# Patient Record
Sex: MALE | Race: WHITE | URBAN - METROPOLITAN AREA
[De-identification: names, ages, dates, MRNs, and addresses within clinical notes are randomized per-mention and may not be internally consistent; named-entity substitution may affect disease eponyms.]

---

## 2017-07-13 ENCOUNTER — EMERGENCY (EMERGENCY)
Facility: HOSPITAL | Age: 57
LOS: 0 days | Discharge: HOME | End: 2017-07-13
Admitting: INTERNAL MEDICINE

## 2017-07-13 DIAGNOSIS — E11.9 TYPE 2 DIABETES MELLITUS WITHOUT COMPLICATIONS: ICD-10-CM

## 2017-07-13 DIAGNOSIS — I10 ESSENTIAL (PRIMARY) HYPERTENSION: ICD-10-CM

## 2017-07-13 DIAGNOSIS — E78.5 HYPERLIPIDEMIA, UNSPECIFIED: ICD-10-CM

## 2017-07-13 DIAGNOSIS — Z79.899 OTHER LONG TERM (CURRENT) DRUG THERAPY: ICD-10-CM

## 2017-07-13 DIAGNOSIS — N49.2 INFLAMMATORY DISORDERS OF SCROTUM: ICD-10-CM

## 2017-07-13 DIAGNOSIS — Z79.84 LONG TERM (CURRENT) USE OF ORAL HYPOGLYCEMIC DRUGS: ICD-10-CM

## 2017-07-13 DIAGNOSIS — E78.00 PURE HYPERCHOLESTEROLEMIA, UNSPECIFIED: ICD-10-CM

## 2017-07-13 DIAGNOSIS — R10.30 LOWER ABDOMINAL PAIN, UNSPECIFIED: ICD-10-CM

## 2017-07-13 DIAGNOSIS — Z88.0 ALLERGY STATUS TO PENICILLIN: ICD-10-CM

## 2017-08-09 ENCOUNTER — OUTPATIENT (OUTPATIENT)
Dept: OUTPATIENT SERVICES | Facility: HOSPITAL | Age: 57
LOS: 1 days | Discharge: HOME | End: 2017-08-09

## 2017-08-09 DIAGNOSIS — I10 ESSENTIAL (PRIMARY) HYPERTENSION: ICD-10-CM

## 2017-08-09 DIAGNOSIS — E11.9 TYPE 2 DIABETES MELLITUS WITHOUT COMPLICATIONS: ICD-10-CM

## 2017-08-09 DIAGNOSIS — E78.5 HYPERLIPIDEMIA, UNSPECIFIED: ICD-10-CM

## 2017-11-18 ENCOUNTER — OUTPATIENT (OUTPATIENT)
Dept: OUTPATIENT SERVICES | Facility: HOSPITAL | Age: 57
LOS: 1 days | Discharge: HOME | End: 2017-11-18

## 2017-11-18 DIAGNOSIS — I10 ESSENTIAL (PRIMARY) HYPERTENSION: ICD-10-CM

## 2017-11-18 DIAGNOSIS — E11.65 TYPE 2 DIABETES MELLITUS WITH HYPERGLYCEMIA: ICD-10-CM

## 2018-05-05 ENCOUNTER — OUTPATIENT (OUTPATIENT)
Dept: OUTPATIENT SERVICES | Facility: HOSPITAL | Age: 58
LOS: 1 days | Discharge: HOME | End: 2018-05-05

## 2018-05-05 DIAGNOSIS — I10 ESSENTIAL (PRIMARY) HYPERTENSION: ICD-10-CM

## 2018-05-05 DIAGNOSIS — E78.5 HYPERLIPIDEMIA, UNSPECIFIED: ICD-10-CM

## 2018-05-05 DIAGNOSIS — E11.9 TYPE 2 DIABETES MELLITUS WITHOUT COMPLICATIONS: ICD-10-CM

## 2019-03-02 ENCOUNTER — OUTPATIENT (OUTPATIENT)
Dept: OUTPATIENT SERVICES | Facility: HOSPITAL | Age: 59
LOS: 1 days | Discharge: HOME | End: 2019-03-02

## 2019-03-02 DIAGNOSIS — I10 ESSENTIAL (PRIMARY) HYPERTENSION: ICD-10-CM

## 2019-03-02 DIAGNOSIS — E78.5 HYPERLIPIDEMIA, UNSPECIFIED: ICD-10-CM

## 2019-03-02 DIAGNOSIS — E11.9 TYPE 2 DIABETES MELLITUS WITHOUT COMPLICATIONS: ICD-10-CM

## 2019-03-09 ENCOUNTER — OUTPATIENT (OUTPATIENT)
Dept: OUTPATIENT SERVICES | Facility: HOSPITAL | Age: 59
LOS: 1 days | Discharge: HOME | End: 2019-03-09

## 2019-03-09 DIAGNOSIS — E11.9 TYPE 2 DIABETES MELLITUS WITHOUT COMPLICATIONS: ICD-10-CM

## 2019-03-09 DIAGNOSIS — I10 ESSENTIAL (PRIMARY) HYPERTENSION: ICD-10-CM

## 2019-03-09 DIAGNOSIS — E78.5 HYPERLIPIDEMIA, UNSPECIFIED: ICD-10-CM

## 2020-07-13 PROBLEM — Z00.00 ENCOUNTER FOR PREVENTIVE HEALTH EXAMINATION: Status: ACTIVE | Noted: 2020-07-13

## 2020-08-25 ENCOUNTER — APPOINTMENT (OUTPATIENT)
Dept: PLASTIC SURGERY | Facility: CLINIC | Age: 60
End: 2020-08-25
Payer: COMMERCIAL

## 2020-08-25 VITALS — BODY MASS INDEX: 25.76 KG/M2 | HEIGHT: 68 IN | WEIGHT: 170 LBS

## 2020-08-25 DIAGNOSIS — Z86.39 PERSONAL HISTORY OF OTHER ENDOCRINE, NUTRITIONAL AND METABOLIC DISEASE: ICD-10-CM

## 2020-08-25 DIAGNOSIS — Z87.891 PERSONAL HISTORY OF NICOTINE DEPENDENCE: ICD-10-CM

## 2020-08-25 DIAGNOSIS — Z78.9 OTHER SPECIFIED HEALTH STATUS: ICD-10-CM

## 2020-08-25 DIAGNOSIS — I25.2 OLD MYOCARDIAL INFARCTION: ICD-10-CM

## 2020-08-25 DIAGNOSIS — Z86.79 PERSONAL HISTORY OF OTHER DISEASES OF THE CIRCULATORY SYSTEM: ICD-10-CM

## 2020-08-25 PROCEDURE — 99203 OFFICE O/P NEW LOW 30 MIN: CPT

## 2020-08-25 RX ORDER — METOPROLOL TARTRATE 75 MG/1
TABLET, FILM COATED ORAL
Refills: 0 | Status: ACTIVE | COMMUNITY

## 2020-08-25 RX ORDER — LINAGLIPTIN AND METFORMIN HYDROCHLORIDE 2.5; 1 MG/1; MG/1
TABLET, FILM COATED ORAL
Refills: 0 | Status: ACTIVE | COMMUNITY

## 2020-08-25 RX ORDER — ATORVASTATIN CALCIUM 10 MG/1
10 TABLET, FILM COATED ORAL
Refills: 0 | Status: ACTIVE | COMMUNITY

## 2020-08-25 RX ORDER — CLOPIDOGREL BISULFATE 75 MG/1
75 TABLET, FILM COATED ORAL
Refills: 0 | Status: ACTIVE | COMMUNITY

## 2020-08-25 RX ORDER — ASPIRIN 81 MG
81 TABLET, DELAYED RELEASE (ENTERIC COATED) ORAL
Refills: 0 | Status: ACTIVE | COMMUNITY

## 2020-08-25 NOTE — ASSESSMENT
[FreeTextEntry1] : 61 yo M with right upper back epidermal inclusion cyst and right forearm lipoma. \par \par - recommend excision of both \par \par as above\par right foremarm lipoma and right upper back cyst\par office procedure\par \par Regarding the procedure, we discussed scarring, poor wound healing, bleeding, infection, need for additional surgery, and dissatisfaction with the outcome.  Also discussed possibility of keloid and/or hypertrophic scar formation as well as recurrence.  All questions were answered and risks understood.\par \par will ask cardiologist if can hold ASA or Plavix one week prior\par

## 2020-08-25 NOTE — HISTORY OF PRESENT ILLNESS
[FreeTextEntry1] : 59 yo M with PMH of HTN, CAD, MI 2/20 s/p cardiac stents on ASA/Plavix, who presents today for evaluation of right upper back cyst present for few years increasing in size with h/o intermittent spontaneous drainage. Also c/o right forearm subcutaneous mass increasing in size. Denies any pain or discoloration. Denies any family h/o skin cancer. \par \par Occupation- repairs wheelchairs\par Social hx- former smoker, quit after MI in February 2020

## 2020-08-25 NOTE — PHYSICAL EXAM
[de-identified] : well-developed pleasant male, NAD [de-identified] : NC/AT [de-identified] : PERRL [de-identified] : supple [de-identified] : unlabored breathing, good inspiratory effort [de-identified] : MARJANR [de-identified] : soft, nontender  [de-identified] : Back- right upper back with a soft, subcutaneous cystic mobile lesion measuring 3 x 2.5 cm, small punctum presents, no erythema or active infection, nontender \par Forearm- right radial volar distal forearm with a 3x3 cm soft, mobile, nontender subcutaneous mass c/w lipoma

## 2020-10-08 ENCOUNTER — APPOINTMENT (OUTPATIENT)
Dept: PLASTIC SURGERY | Facility: CLINIC | Age: 60
End: 2020-10-08
Payer: COMMERCIAL

## 2020-10-08 ENCOUNTER — LABORATORY RESULT (OUTPATIENT)
Age: 60
End: 2020-10-08

## 2020-10-08 PROCEDURE — 13101 CMPLX RPR TRUNK 2.6-7.5 CM: CPT | Mod: 59

## 2020-10-08 PROCEDURE — 13121 CMPLX RPR S/A/L 2.6-7.5 CM: CPT | Mod: 59

## 2020-10-08 PROCEDURE — 11403 EXC TR-EXT B9+MARG 2.1-3CM: CPT

## 2020-10-08 NOTE — PROCEDURE
[FreeTextEntry6] : Patient is a 60 year old male with a right forearm lipoma measuring approximately 3 x 3 cm and a right upper back cyst measuring approximately 3 x 2.5 cm.\par \par The areas were prepped and draped in the usual fashion.  Local anesthetic was administered using 1% lidocaine with epinephrine.\par \par The lipoma and cyst were both sharply excised.  Area was irrigated copiously.  Complex wound closure was performed in layers.  The forearm wound measured approximately 3.5  cm and the back wound measured approximately 3.5 cm.\par \par Sterile dressing applied.  \par \par Patient tolerated procedure well and understands post-op instructions.\par \par Sutures Used:  3-0 prolene, 3-0 monocryl\par \par \par \par

## 2020-10-22 ENCOUNTER — APPOINTMENT (OUTPATIENT)
Dept: PLASTIC SURGERY | Facility: CLINIC | Age: 60
End: 2020-10-22
Payer: COMMERCIAL

## 2020-10-22 DIAGNOSIS — L72.0 EPIDERMAL CYST: ICD-10-CM

## 2020-10-22 DIAGNOSIS — D17.21 BENIGN LIPOMATOUS NEOPLASM OF SKIN AND SUBCUTANEOUS TISSUE OF RIGHT ARM: ICD-10-CM

## 2020-10-22 PROCEDURE — 99212 OFFICE O/P EST SF 10 MIN: CPT

## 2020-10-22 NOTE — DATA REVIEWED
[FreeTextEntry1] : Biopsy             Final\par \par No Documents Attached\par \par \par \par   ALICIA BARBOUR                        2\par \par \par                  Surgical Final Report\par \par \par           Final Diagnosis\par           1. Right upper back cyst, excision:\par           - Epidermal inclusion cyst.\par \par           2. Right forearm lipoma, excision:\par           - Lipomatous tissue.\par \par           Verified by: Carolina Giles M.D.\par           (Electronic Signature)\par           Reported on: 10/14/20 11:31 EDT, [address] [place] [id number]\par           Phone: [phone]   Fax: [phone]\par           _________________________________________________________________\par \par           Clinical History\par           Excision of right upper back cyst, excision of right forearm\par           lipoma\par \par           Specimen(s) Submitted\par           1     Excision right upper back cyst\par           2     Excision of right forearm lipoma\par \par           Gross Description\par           1.The specimen is received in formalin, labeled "excision of\par           right upper back cyst" and consists of a single piece of soft\par           tissue, measuring 2 x 1.5 x 1.5 cm.  The specimen has a cystic\par           structure which is partially opened. The outer surface is white,\par           smooth with attached fatty tissue. The specimen is inked.\par           Sectioning reveals a cyst wall less than 0.1 cm in thickness, and\par           whitish cheesy contents. Representative sections are submitted.\par           (1 block)\par \par           2. The specimen is received in formalin, labeled "excision of\par           right forearm lipoma" and consists of a single piece of lobulated\par           fatty tissue, measuring 4 x 2.5 x 0.8 cm. The specimen is inked\par           black and sectioned to reveal an uniformly yellow, unremarkable\par           fatty cut surface. Representative sections are submitted. (2\par           blocks)\par \par           Specimen was received and underwent gross examination at [facility], [address] [place] [address],\par           [place] [address].\par \par           10/09/20 14:02 rr\par \par           Perioperative Diagnosis\par \par \par \par \par \par \par \par           ALICIA BARBOUR                        2\par \par \par \par                  Surgical Final Report\par \par \par \par \par           L72.0-Epidermal cyst\par           D17.21-Benign lipomatous neoplasm of skin and subcutaneous tissue\par           of right arm\par \par  \par \par  Ordered by: JEANNE MARY IV       Collected/Examined: [id number] 02:14PM       \par Verification Required       Stage: Final       \par  Performed at: [facility] (Med Director: Howard Raymond M.D)       Resulted: 14Oct2020 11:31AM       Last Updated: 14Oct2020 11:31AM       Accession: [id number]

## 2020-10-22 NOTE — HISTORY OF PRESENT ILLNESS
[FreeTextEntry1] : 59 yo M with PMH of HTN, CAD, MI 2/20 s/p cardiac stents on ASA/Plavix, who presents today for evaluation of right upper back cyst present for few years increasing in size with h/o intermittent spontaneous drainage. Also c/o right forearm subcutaneous mass increasing in size. Denies any pain or discoloration. Denies any family h/o skin cancer. \par \par Occupation- repairs wheelchairs\par Social hx- former smoker, quit after MI in February 2020\par \par Interval hx (10/22/20). Patient presents today 2 weeks s/p excision of right upper back cyst and right forearm lipoma. Doing well with no significant pain, f/c or drainage.

## 2020-10-22 NOTE — ASSESSMENT
[FreeTextEntry1] : 61 yo M with right upper back epidermal inclusion cyst and right forearm lipoma  now POD#14 s/p excision. \par \par - Sutures removed \par - Daily Aquaphor to back incision\par - Pathology discussed\par - Post-op instructions discussed\par - f/u 2 weeks for back wound check \par \par

## 2020-10-22 NOTE — PHYSICAL EXAM
[de-identified] : well-developed pleasant male, NAD [de-identified] : Back- right upper back incision with delayed wound healing, dry eschar present, no erythema or fluid collection \par Forearm- right radial volar distal forearm incision fine, c/d/i

## 2020-11-05 ENCOUNTER — APPOINTMENT (OUTPATIENT)
Dept: PLASTIC SURGERY | Facility: CLINIC | Age: 60
End: 2020-11-05

## 2021-11-03 ENCOUNTER — TRANSCRIPTION ENCOUNTER (OUTPATIENT)
Age: 61
End: 2021-11-03

## 2021-11-04 ENCOUNTER — INPATIENT (INPATIENT)
Facility: HOSPITAL | Age: 61
LOS: 82 days | Discharge: NURSING, REHAB OR LONG-TERM CARE | DRG: 3 | End: 2022-01-26
Attending: STUDENT IN AN ORGANIZED HEALTH CARE EDUCATION/TRAINING PROGRAM | Admitting: NEUROLOGICAL SURGERY
Payer: COMMERCIAL

## 2021-11-04 ENCOUNTER — APPOINTMENT (OUTPATIENT)
Dept: NEUROSURGERY | Facility: HOSPITAL | Age: 61
End: 2021-11-04
Payer: COMMERCIAL

## 2021-11-04 VITALS
HEART RATE: 113 BPM | RESPIRATION RATE: 24 BRPM | SYSTOLIC BLOOD PRESSURE: 245 MMHG | OXYGEN SATURATION: 100 % | WEIGHT: 193.35 LBS | DIASTOLIC BLOOD PRESSURE: 142 MMHG

## 2021-11-04 DIAGNOSIS — I62.9 NONTRAUMATIC INTRACRANIAL HEMORRHAGE, UNSPECIFIED: ICD-10-CM

## 2021-11-04 LAB
A1C WITH ESTIMATED AVERAGE GLUCOSE RESULT: 6.5 % — HIGH (ref 4–5.6)
ALBUMIN SERPL ELPH-MCNC: 4.8 G/DL — SIGNIFICANT CHANGE UP (ref 3.3–5)
ALP SERPL-CCNC: 94 U/L — SIGNIFICANT CHANGE UP (ref 40–120)
ALT FLD-CCNC: 14 U/L — SIGNIFICANT CHANGE UP (ref 10–45)
ANION GAP SERPL CALC-SCNC: 18 MMOL/L — HIGH (ref 5–17)
APTT BLD: 27.7 SEC — SIGNIFICANT CHANGE UP (ref 27.5–35.5)
AST SERPL-CCNC: 18 U/L — SIGNIFICANT CHANGE UP (ref 10–40)
BASE EXCESS BLDV CALC-SCNC: -3.4 MMOL/L — LOW (ref -2–2)
BASOPHILS # BLD AUTO: 0 K/UL — SIGNIFICANT CHANGE UP (ref 0–0.2)
BASOPHILS NFR BLD AUTO: 0 % — SIGNIFICANT CHANGE UP (ref 0–2)
BILIRUB SERPL-MCNC: 0.4 MG/DL — SIGNIFICANT CHANGE UP (ref 0.2–1.2)
BLD GP AB SCN SERPL QL: NEGATIVE — SIGNIFICANT CHANGE UP
BUN SERPL-MCNC: 18 MG/DL — SIGNIFICANT CHANGE UP (ref 7–23)
BURR CELLS BLD QL SMEAR: PRESENT — SIGNIFICANT CHANGE UP
BURR CELLS BLD QL SMEAR: SLIGHT — SIGNIFICANT CHANGE UP
CA-I SERPL-SCNC: 1.28 MMOL/L — SIGNIFICANT CHANGE UP (ref 1.15–1.33)
CALCIUM SERPL-MCNC: 9.5 MG/DL — SIGNIFICANT CHANGE UP (ref 8.4–10.5)
CHLORIDE BLDV-SCNC: 104 MMOL/L — SIGNIFICANT CHANGE UP (ref 96–108)
CHLORIDE SERPL-SCNC: 103 MMOL/L — SIGNIFICANT CHANGE UP (ref 96–108)
CO2 BLDV-SCNC: 27 MMOL/L — HIGH (ref 22–26)
CO2 SERPL-SCNC: 19 MMOL/L — LOW (ref 22–31)
CREAT SERPL-MCNC: 1.24 MG/DL — SIGNIFICANT CHANGE UP (ref 0.5–1.3)
ELLIPTOCYTES BLD QL SMEAR: SLIGHT — SIGNIFICANT CHANGE UP
EOSINOPHIL # BLD AUTO: 0.26 K/UL — SIGNIFICANT CHANGE UP (ref 0–0.5)
EOSINOPHIL NFR BLD AUTO: 1.7 % — SIGNIFICANT CHANGE UP (ref 0–6)
ESTIMATED AVERAGE GLUCOSE: 140 MG/DL — HIGH (ref 68–114)
GAS PNL BLDA: SIGNIFICANT CHANGE UP
GAS PNL BLDA: SIGNIFICANT CHANGE UP
GAS PNL BLDV: 137 MMOL/L — SIGNIFICANT CHANGE UP (ref 136–145)
GAS PNL BLDV: SIGNIFICANT CHANGE UP
GAS PNL BLDV: SIGNIFICANT CHANGE UP
GLUCOSE BLDV-MCNC: 210 MG/DL — HIGH (ref 70–99)
GLUCOSE SERPL-MCNC: 226 MG/DL — HIGH (ref 70–99)
HCO3 BLDV-SCNC: 25 MMOL/L — SIGNIFICANT CHANGE UP (ref 22–29)
HCT VFR BLD CALC: 40.5 % — SIGNIFICANT CHANGE UP (ref 39–50)
HCT VFR BLDA CALC: 39 % — SIGNIFICANT CHANGE UP (ref 39–51)
HGB BLD CALC-MCNC: 12.9 G/DL — SIGNIFICANT CHANGE UP (ref 12.6–17.4)
HGB BLD-MCNC: 12.9 G/DL — LOW (ref 13–17)
INR BLD: 0.96 RATIO — SIGNIFICANT CHANGE UP (ref 0.88–1.16)
LACTATE BLDV-MCNC: 3 MMOL/L — HIGH (ref 0.7–2)
LYMPHOCYTES # BLD AUTO: 36.3 % — SIGNIFICANT CHANGE UP (ref 13–44)
LYMPHOCYTES # BLD AUTO: 5.55 K/UL — HIGH (ref 1–3.3)
MANUAL SMEAR VERIFICATION: SIGNIFICANT CHANGE UP
MCHC RBC-ENTMCNC: 29.8 PG — SIGNIFICANT CHANGE UP (ref 27–34)
MCHC RBC-ENTMCNC: 31.9 GM/DL — LOW (ref 32–36)
MCV RBC AUTO: 93.5 FL — SIGNIFICANT CHANGE UP (ref 80–100)
METAMYELOCYTES # FLD: 0.9 % — HIGH (ref 0–0)
MONOCYTES # BLD AUTO: 0.95 K/UL — HIGH (ref 0–0.9)
MONOCYTES NFR BLD AUTO: 6.2 % — SIGNIFICANT CHANGE UP (ref 2–14)
NEUTROPHILS # BLD AUTO: 8.26 K/UL — HIGH (ref 1.8–7.4)
NEUTROPHILS NFR BLD AUTO: 53.1 % — SIGNIFICANT CHANGE UP (ref 43–77)
NEUTS BAND # BLD: 0.9 % — SIGNIFICANT CHANGE UP (ref 0–8)
PCO2 BLDV: 60 MMHG — HIGH (ref 42–55)
PH BLDV: 7.23 — LOW (ref 7.32–7.43)
PLAT MORPH BLD: NORMAL — SIGNIFICANT CHANGE UP
PLATELET # BLD AUTO: 322 K/UL — SIGNIFICANT CHANGE UP (ref 150–400)
PO2 BLDV: 162 MMHG — HIGH (ref 25–45)
POIKILOCYTOSIS BLD QL AUTO: SIGNIFICANT CHANGE UP
POTASSIUM BLDV-SCNC: 3.2 MMOL/L — LOW (ref 3.5–5.1)
POTASSIUM SERPL-MCNC: 3.3 MMOL/L — LOW (ref 3.5–5.3)
POTASSIUM SERPL-SCNC: 3.3 MMOL/L — LOW (ref 3.5–5.3)
PROLACTIN SERPL-MCNC: 38.5 NG/ML — HIGH (ref 4.1–18.4)
PROMYELOCYTES # FLD: 0.9 % — HIGH (ref 0–0)
PROT SERPL-MCNC: 7.8 G/DL — SIGNIFICANT CHANGE UP (ref 6–8.3)
PROTHROM AB SERPL-ACNC: 11.5 SEC — SIGNIFICANT CHANGE UP (ref 10.6–13.6)
RBC # BLD: 4.33 M/UL — SIGNIFICANT CHANGE UP (ref 4.2–5.8)
RBC # FLD: 13.3 % — SIGNIFICANT CHANGE UP (ref 10.3–14.5)
RBC BLD AUTO: ABNORMAL
RH IG SCN BLD-IMP: POSITIVE — SIGNIFICANT CHANGE UP
RH IG SCN BLD-IMP: POSITIVE — SIGNIFICANT CHANGE UP
SAO2 % BLDV: 99.7 % — HIGH (ref 67–88)
SARS-COV-2 RNA SPEC QL NAA+PROBE: SIGNIFICANT CHANGE UP
SCHISTOCYTES BLD QL AUTO: SLIGHT — SIGNIFICANT CHANGE UP
SODIUM SERPL-SCNC: 140 MMOL/L — SIGNIFICANT CHANGE UP (ref 135–145)
TARGETS BLD QL SMEAR: SLIGHT — SIGNIFICANT CHANGE UP
TROPONIN T, HIGH SENSITIVITY RESULT: 7 NG/L — SIGNIFICANT CHANGE UP (ref 0–51)
WBC # BLD: 15.29 K/UL — HIGH (ref 3.8–10.5)
WBC # FLD AUTO: 15.29 K/UL — HIGH (ref 3.8–10.5)

## 2021-11-04 PROCEDURE — 0042T: CPT

## 2021-11-04 PROCEDURE — 93308 TTE F-UP OR LMTD: CPT | Mod: 26

## 2021-11-04 PROCEDURE — 36620 INSERTION CATHETER ARTERY: CPT

## 2021-11-04 PROCEDURE — 70450 CT HEAD/BRAIN W/O DYE: CPT | Mod: 26,59

## 2021-11-04 PROCEDURE — 70496 CT ANGIOGRAPHY HEAD: CPT | Mod: 26

## 2021-11-04 PROCEDURE — 99291 CRITICAL CARE FIRST HOUR: CPT

## 2021-11-04 PROCEDURE — 99291 CRITICAL CARE FIRST HOUR: CPT | Mod: 25

## 2021-11-04 PROCEDURE — 71045 X-RAY EXAM CHEST 1 VIEW: CPT | Mod: 26

## 2021-11-04 PROCEDURE — 99292 CRITICAL CARE ADDL 30 MIN: CPT

## 2021-11-04 PROCEDURE — 31500 INSERT EMERGENCY AIRWAY: CPT

## 2021-11-04 PROCEDURE — 70498 CT ANGIOGRAPHY NECK: CPT | Mod: 26

## 2021-11-04 PROCEDURE — 61345 OTH CRANIAL DCMPRN PST FOSSA: CPT

## 2021-11-04 RX ORDER — FENTANYL CITRATE 50 UG/ML
25 INJECTION INTRAVENOUS
Refills: 0 | Status: DISCONTINUED | OUTPATIENT
Start: 2021-11-04 | End: 2021-11-06

## 2021-11-04 RX ORDER — DILTIAZEM HCL 120 MG
5 CAPSULE, EXT RELEASE 24 HR ORAL
Qty: 125 | Refills: 0 | Status: DISCONTINUED | OUTPATIENT
Start: 2021-11-04 | End: 2021-11-04

## 2021-11-04 RX ORDER — INSULIN LISPRO 100/ML
VIAL (ML) SUBCUTANEOUS EVERY 6 HOURS
Refills: 0 | Status: DISCONTINUED | OUTPATIENT
Start: 2021-11-04 | End: 2021-11-04

## 2021-11-04 RX ORDER — MANNITOL
25 POWDER (GRAM) MISCELLANEOUS ONCE
Refills: 0 | Status: COMPLETED | OUTPATIENT
Start: 2021-11-04 | End: 2021-11-04

## 2021-11-04 RX ORDER — LABETALOL HCL 100 MG
1 TABLET ORAL
Qty: 200 | Refills: 0 | Status: DISCONTINUED | OUTPATIENT
Start: 2021-11-04 | End: 2021-11-04

## 2021-11-04 RX ORDER — ETOMIDATE 2 MG/ML
20 INJECTION INTRAVENOUS ONCE
Refills: 0 | Status: COMPLETED | OUTPATIENT
Start: 2021-11-04 | End: 2021-11-04

## 2021-11-04 RX ORDER — CALCIUM GLUCONATE 100 MG/ML
1 VIAL (ML) INTRAVENOUS ONCE
Refills: 0 | Status: DISCONTINUED | OUTPATIENT
Start: 2021-11-04 | End: 2021-11-04

## 2021-11-04 RX ORDER — CEFAZOLIN SODIUM 1 G
2000 VIAL (EA) INJECTION ONCE
Refills: 0 | Status: DISCONTINUED | OUTPATIENT
Start: 2021-11-04 | End: 2021-11-04

## 2021-11-04 RX ORDER — NICARDIPINE HYDROCHLORIDE 30 MG/1
5 CAPSULE, EXTENDED RELEASE ORAL
Qty: 40 | Refills: 0 | Status: DISCONTINUED | OUTPATIENT
Start: 2021-11-04 | End: 2021-11-04

## 2021-11-04 RX ORDER — DEXTROSE 50 % IN WATER 50 %
25 SYRINGE (ML) INTRAVENOUS ONCE
Refills: 0 | Status: DISCONTINUED | OUTPATIENT
Start: 2021-11-04 | End: 2021-11-23

## 2021-11-04 RX ORDER — POLYETHYLENE GLYCOL 3350 17 G/17G
17 POWDER, FOR SOLUTION ORAL AT BEDTIME
Refills: 0 | Status: DISCONTINUED | OUTPATIENT
Start: 2021-11-04 | End: 2021-11-08

## 2021-11-04 RX ORDER — INSULIN LISPRO 100/ML
VIAL (ML) SUBCUTANEOUS EVERY 6 HOURS
Refills: 0 | Status: DISCONTINUED | OUTPATIENT
Start: 2021-11-04 | End: 2021-11-05

## 2021-11-04 RX ORDER — SODIUM CHLORIDE 5 G/100ML
1000 INJECTION, SOLUTION INTRAVENOUS
Refills: 0 | Status: DISCONTINUED | OUTPATIENT
Start: 2021-11-04 | End: 2021-11-04

## 2021-11-04 RX ORDER — CEFAZOLIN SODIUM 1 G
2000 VIAL (EA) INJECTION EVERY 8 HOURS
Refills: 0 | Status: COMPLETED | OUTPATIENT
Start: 2021-11-05 | End: 2021-11-05

## 2021-11-04 RX ORDER — DESMOPRESSIN ACETATE 0.1 MG/1
25 TABLET ORAL ONCE
Refills: 0 | Status: COMPLETED | OUTPATIENT
Start: 2021-11-04 | End: 2021-11-04

## 2021-11-04 RX ORDER — PROPOFOL 10 MG/ML
50 INJECTION, EMULSION INTRAVENOUS
Qty: 500 | Refills: 0 | Status: DISCONTINUED | OUTPATIENT
Start: 2021-11-04 | End: 2021-11-04

## 2021-11-04 RX ORDER — DESMOPRESSIN ACETATE 0.1 MG/1
27 TABLET ORAL ONCE
Refills: 0 | Status: DISCONTINUED | OUTPATIENT
Start: 2021-11-04 | End: 2021-11-04

## 2021-11-04 RX ORDER — PROPOFOL 10 MG/ML
20 INJECTION, EMULSION INTRAVENOUS
Qty: 1000 | Refills: 0 | Status: DISCONTINUED | OUTPATIENT
Start: 2021-11-04 | End: 2021-11-04

## 2021-11-04 RX ORDER — PANTOPRAZOLE SODIUM 20 MG/1
40 TABLET, DELAYED RELEASE ORAL DAILY
Refills: 0 | Status: DISCONTINUED | OUTPATIENT
Start: 2021-11-04 | End: 2021-11-06

## 2021-11-04 RX ORDER — LABETALOL HCL 100 MG
100 TABLET ORAL EVERY 8 HOURS
Refills: 0 | Status: DISCONTINUED | OUTPATIENT
Start: 2021-11-04 | End: 2021-11-08

## 2021-11-04 RX ORDER — SENNA PLUS 8.6 MG/1
2 TABLET ORAL AT BEDTIME
Refills: 0 | Status: DISCONTINUED | OUTPATIENT
Start: 2021-11-04 | End: 2021-11-08

## 2021-11-04 RX ORDER — CHLORHEXIDINE GLUCONATE 213 G/1000ML
1 SOLUTION TOPICAL
Refills: 0 | Status: DISCONTINUED | OUTPATIENT
Start: 2021-11-04 | End: 2021-11-17

## 2021-11-04 RX ORDER — POTASSIUM CHLORIDE 20 MEQ
40 PACKET (EA) ORAL EVERY 4 HOURS
Refills: 0 | Status: DISCONTINUED | OUTPATIENT
Start: 2021-11-04 | End: 2021-11-04

## 2021-11-04 RX ORDER — CHLORHEXIDINE GLUCONATE 213 G/1000ML
15 SOLUTION TOPICAL EVERY 12 HOURS
Refills: 0 | Status: DISCONTINUED | OUTPATIENT
Start: 2021-11-04 | End: 2021-11-04

## 2021-11-04 RX ORDER — FENTANYL CITRATE 50 UG/ML
25 INJECTION INTRAVENOUS ONCE
Refills: 0 | Status: DISCONTINUED | OUTPATIENT
Start: 2021-11-04 | End: 2021-11-04

## 2021-11-04 RX ORDER — NICARDIPINE HYDROCHLORIDE 30 MG/1
5 CAPSULE, EXTENDED RELEASE ORAL
Qty: 40 | Refills: 0 | Status: DISCONTINUED | OUTPATIENT
Start: 2021-11-04 | End: 2021-11-05

## 2021-11-04 RX ORDER — MANNITOL
25 POWDER (GRAM) MISCELLANEOUS ONCE
Refills: 0 | Status: DISCONTINUED | OUTPATIENT
Start: 2021-11-04 | End: 2021-11-04

## 2021-11-04 RX ORDER — FENTANYL CITRATE 50 UG/ML
100 INJECTION INTRAVENOUS ONCE
Refills: 0 | Status: DISCONTINUED | OUTPATIENT
Start: 2021-11-04 | End: 2021-11-04

## 2021-11-04 RX ORDER — CEFAZOLIN SODIUM 1 G
2000 VIAL (EA) INJECTION ONCE
Refills: 0 | Status: COMPLETED | OUTPATIENT
Start: 2021-11-04 | End: 2021-11-04

## 2021-11-04 RX ORDER — ROCURONIUM BROMIDE 10 MG/ML
100 VIAL (ML) INTRAVENOUS ONCE
Refills: 0 | Status: COMPLETED | OUTPATIENT
Start: 2021-11-04 | End: 2021-11-04

## 2021-11-04 RX ORDER — DEXTROSE 50 % IN WATER 50 %
12.5 SYRINGE (ML) INTRAVENOUS ONCE
Refills: 0 | Status: DISCONTINUED | OUTPATIENT
Start: 2021-11-04 | End: 2021-11-23

## 2021-11-04 RX ADMIN — DESMOPRESSIN ACETATE 225 MICROGRAM(S): 0.1 TABLET ORAL at 13:28

## 2021-11-04 RX ADMIN — PROPOFOL 26.3 MICROGRAM(S)/KG/MIN: 10 INJECTION, EMULSION INTRAVENOUS at 11:42

## 2021-11-04 RX ADMIN — ETOMIDATE 20 MILLIGRAM(S): 2 INJECTION INTRAVENOUS at 10:17

## 2021-11-04 RX ADMIN — Medication 250 GRAM(S): at 11:16

## 2021-11-04 RX ADMIN — Medication 100 MILLIGRAM(S): at 13:09

## 2021-11-04 RX ADMIN — Medication 100 MILLIGRAM(S): at 10:17

## 2021-11-04 RX ADMIN — FENTANYL CITRATE 25 MICROGRAM(S): 50 INJECTION INTRAVENOUS at 23:54

## 2021-11-04 RX ADMIN — NICARDIPINE HYDROCHLORIDE 25 MG/HR: 30 CAPSULE, EXTENDED RELEASE ORAL at 21:31

## 2021-11-04 RX ADMIN — NICARDIPINE HYDROCHLORIDE 25 MG/HR: 30 CAPSULE, EXTENDED RELEASE ORAL at 11:49

## 2021-11-04 NOTE — H&P ADULT - ASSESSMENT
ALICIA BARBOUR  61M pmhx HTN, DM at work complaining of HA ~8:30, starting feeling dizzy and vomiting, HTN/keke when EMS got to him. SBP 220s, HR 50s. Pre/intubation exam: no eyes open, pupils 2b/l, + corneals/cough/gag, not FC, LUE spont fingers moving, flacid otherwise. CTH shows large posterior fossa bleed w/ IVH/SAH/hydro.   -Adm NSCU, q1h neuro check  -EVD in ED, drainage at 15  -CT on way to NSCU

## 2021-11-04 NOTE — ED PROVIDER NOTE - OBJECTIVE STATEMENT
Attending Josee Valente: 62 yo male without known medical issues brought in for ams Attending Josee Valente: 60 yo male without known medical issues brought in for ams    61 year old male, unknown-handedness, with PMHx HTN, DM, presenting to the [facility] ED as a code stroke. Per EMS, patient c/o headache, nausea, and vomiting. Per EMS report, he apparently started acting "not quite right" around 08:30 AM. LKN is unknown. On EMS arrival at 09:39AM 11/4/21, patient seen talking a little, garbling, moving all extremities, then quickly became unresponsive. No known blood thinners. Attending Josee Valente: 60 yo male with h/o htn, brought in for ams. per report pt complainined for headache and began vomiting. when ems arrived altered and became unresponsive. was initially bradycardic and hypertensive and then became tahcycardic. no reports of trauma. no known blood thinners    61 year old male, unknown-handedness, with PMHx HTN, DM, presenting to the [facility] ED as a code stroke. Per EMS, patient c/o headache, nausea, and vomiting. Per EMS report, he apparently started acting "not quite right" around 08:30 AM. LKN is unknown. On EMS arrival at 09:39AM 11/4/21, patient seen talking a little, garbling, moving all extremities, then quickly became unresponsive. No known blood thinners.

## 2021-11-04 NOTE — CONSULT NOTE ADULT - SUBJECTIVE AND OBJECTIVE BOX
Neurology  Consult Note  11-04-21    Name:  ALICIA BARBOUR; 61y (1960)     HPI:   61 year old male, unknown-handedness, with PMHx HTN, DM, presenting to the [facility] ED as a code stroke. Per EMS, patient c/o headache, nausea, and vomiting. He apparently started acting "not quite right" ***    Review of Systems: unable to assess due to unresponsiveness     Allergy:  penicillin (Other)      PMHx: unable to assess due to unresponsiveness     PFHx: unable to assess due to unresponsiveness     PSuHx: unable to assess due to unresponsiveness       Medications:  MEDICATIONS  (STANDING):    MEDICATIONS  (PRN):      Vitals:  T(C): --  HR: --  BP: --  RR: --  SpO2: --    Physical Examination: INCOMPLETE  Neurologic:    - Mental Status: Alert, awake, oriented to person, place, and time; Speech is fluent with intact naming, repetition, and comprehension; Good overall fund of knowledge; Immediate recall is 3/3 words and delayed recall is 3/3 words at 5 minutes; Able to spell WORLD backwards and perform serial 7 subtraction; Able to read and write a sentence.    - Cranial Nerves:  II: Visual fields are full to confrontation; Pupils are equal, round, and reactive to light;   III, IV, VI: Extraocular movements are intact without nystagmus.  V: Facial sensation is intact in the V1-V3 distribution bilaterally.  VII: Face is symmetric with normal eye closure and smile.  VIII: Hearing is intact to conversation.  IX, X: Uvula is midline and soft palate rises symmetrically.  XI: Head turning and shoulder shrug are intact.  XII: Tongue protrudes in the midline.    -Motor/Strength Testing:                                 Right           Left  Deltoid                     5                 5  Biceps                      5                 5  Triceps                     5                 5  Wrist Ext (radial)       5                 5  Wrist Flex                 5                 5  Interphalangeal        5                 5  APB (Thumb)            5                 5    Hip Flex                   5                  5  Knee Flex                 5                  5  Knee Ext	      5                  5  Dorsiflex                  5                  5  Plantarflex               5                  5    -There is no pronator drift. Normal muscle bulk and tone throughout.    - Reflexes:   Bicep (C5/C6):                  R 2+ --- L 2+   Tricep (C7):                      R 2+ --- L 2+   Brachioradialis (C5/C6) :   R 2+ --- L 2+   Patella (L3/L4) :                 R 2+ --- L 2+   Ankle (S1) :                       R 2+ --- L 2+     -Plant responses down bilaterally.    - Sensory: Intact throughout to light touch, pin prick, vibration, and joint-position.  - Coordination: Finger-nose-finger and heel-knee-shin intact without dysmetria. Rapid alternating hand movements intact.  - Gait: Normal steps, base, arm swing, and turning. Tandem gait is normal. Romberg testing is negative.    Labs:                        12.9   15.29 )-----------( 322      ( 04 Nov 2021 10:16 )             40.5           CAPILLARY BLOOD GLUCOSE     Radiology:     INCOMPLETE    Neurology  Consult Note  11-04-21    Name:  ALICIA BARBOUR; 61y (1960)     HPI:   61 year old male, unknown-handedness, with PMHx HTN, DM, presenting to the [facility] ED as a code stroke. Per EMS, patient c/o headache, nausea, and vomiting. Per EMS report, he apparently started acting "not quite right" around 08:30 AM. LKN is unknown. On EMS arrival at 09:39AM 11/4/21, patient seen talking a little, garbling, moving all extremities, then quickly became unresponsive. ***. Per EMS, en route, patient with sinus bradycardia, also w/ /170.     Code stroke called in the ED. ***    Review of Systems: unable to assess due to unresponsiveness     Allergy:  penicillin (Other)      PMHx: unable to assess due to unresponsiveness     PFHx: unable to assess due to unresponsiveness     PSuHx: unable to assess due to unresponsiveness       Medications:  MEDICATIONS  (STANDING):    MEDICATIONS  (PRN):      Vitals:  T(C): --  HR: --  BP: --  RR: --  SpO2: --    Physical Examination: INCOMPLETE  Neurologic:    - Mental Status: Alert, awake, oriented to person, place, and time; Speech is fluent with intact naming, repetition, and comprehension; Good overall fund of knowledge; Immediate recall is 3/3 words and delayed recall is 3/3 words at 5 minutes; Able to spell WORLD backwards and perform serial 7 subtraction; Able to read and write a sentence.    - Cranial Nerves:  II: Visual fields are full to confrontation; Pupils are equal, round, and reactive to light;   III, IV, VI: Extraocular movements are intact without nystagmus.  V: Facial sensation is intact in the V1-V3 distribution bilaterally.  VII: Face is symmetric with normal eye closure and smile.  VIII: Hearing is intact to conversation.  IX, X: Uvula is midline and soft palate rises symmetrically.  XI: Head turning and shoulder shrug are intact.  XII: Tongue protrudes in the midline.    -Motor/Strength Testing:                                 Right           Left  Deltoid                     5                 5  Biceps                      5                 5  Triceps                     5                 5  Wrist Ext (radial)       5                 5  Wrist Flex                 5                 5  Interphalangeal        5                 5  APB (Thumb)            5                 5    Hip Flex                   5                  5  Knee Flex                 5                  5  Knee Ext	      5                  5  Dorsiflex                  5                  5  Plantarflex               5                  5    -There is no pronator drift. Normal muscle bulk and tone throughout.    - Reflexes:   Bicep (C5/C6):                  R 2+ --- L 2+   Tricep (C7):                      R 2+ --- L 2+   Brachioradialis (C5/C6) :   R 2+ --- L 2+   Patella (L3/L4) :                 R 2+ --- L 2+   Ankle (S1) :                       R 2+ --- L 2+     -Plant responses down bilaterally.    - Sensory: Intact throughout to light touch, pin prick, vibration, and joint-position.  - Coordination: Finger-nose-finger and heel-knee-shin intact without dysmetria. Rapid alternating hand movements intact.  - Gait: Normal steps, base, arm swing, and turning. Tandem gait is normal. Romberg testing is negative.    Labs:                        12.9   15.29 )-----------( 322      ( 04 Nov 2021 10:16 )             40.5           CAPILLARY BLOOD GLUCOSE     Radiology:     Neurology  Consult Note  11-04-21    Name:  ALICIA BARBOUR; 61y (1960)     HPI:   61 year old male, unknown-handedness, with PMHx HTN, DM, presenting to the [facility] ED as a code stroke. Per EMS, patient c/o headache, nausea, and vomiting. Per EMS report, he apparently started acting "not quite right" around 08:30 AM. LKN is unknown. On EMS arrival at 09:39AM 11/4/21, patient seen talking a little, garbling, moving all extremities, then quickly became unresponsive. Per EMS, en route, patient with sinus bradycardia, also w/ /170. Code stroke called in the ED.     Review of Systems: unable to assess due to unresponsiveness     Allergy:  penicillin (Other)      PMHx: unable to assess due to unresponsiveness     PFHx: unable to assess due to unresponsiveness     PSuHx: unable to assess due to unresponsiveness       Medications:  MEDICATIONS  (STANDING):    MEDICATIONS  (PRN):      Vitals:  T(C): --  HR: --  BP: --  RR: --  SpO2: --    Physical Examination:    General: sedated, intubated, totally unresponsive  Head: NC/NT  Abdomen: soft, non-distended  Extremities: no lesions, no edema    Neurologic: limited due to sedation and unresponsiveness    - Mental Status: sedated, totally unresponsive    Alert, awake, oriented to person, place, and time; Speech is fluent with intact naming, repetition, and comprehension; Good overall fund of knowledge; Immediate recall is 3/3 words and delayed recall is 3/3 words at 5 minutes; Able to spell WORLD backwards and perform serial 7 subtraction; Able to read and write a sentence.    - Cranial Nerves:  II: Visual fields are full to confrontation; Pupils are equal, round, and reactive to light;   III, IV, VI: Extraocular movements are intact without nystagmus.  V: Facial sensation is intact in the V1-V3 distribution bilaterally.  VII: Face is symmetric with normal eye closure and smile.  VIII: Hearing is intact to conversation.  IX, X: Uvula is midline and soft palate rises symmetrically.  XI: Head turning and shoulder shrug are intact.  XII: Tongue protrudes in the midline.    -Motor/Strength Testing:                                 Right           Left  Deltoid                     5                 5  Biceps                      5                 5  Triceps                     5                 5  Wrist Ext (radial)       5                 5  Wrist Flex                 5                 5  Interphalangeal        5                 5  APB (Thumb)            5                 5    Hip Flex                   5                  5  Knee Flex                 5                  5  Knee Ext	      5                  5  Dorsiflex                  5                  5  Plantarflex               5                  5    -There is no pronator drift. Normal muscle bulk and tone throughout.    - Reflexes:   Bicep (C5/C6):                  R 2+ --- L 2+   Tricep (C7):                      R 2+ --- L 2+   Brachioradialis (C5/C6) :   R 2+ --- L 2+   Patella (L3/L4) :                 R 2+ --- L 2+   Ankle (S1) :                       R 2+ --- L 2+     -Plant responses down bilaterally.    - Sensory: Intact throughout to light touch, pin prick, vibration, and joint-position.  - Coordination: Finger-nose-finger and heel-knee-shin intact without dysmetria. Rapid alternating hand movements intact.  - Gait: Normal steps, base, arm swing, and turning. Tandem gait is normal. Romberg testing is negative.    Labs:                        12.9   15.29 )-----------( 322      ( 04 Nov 2021 10:16 )             40.5           CAPILLARY BLOOD GLUCOSE     Radiology:     Neurology  Consult Note  11-04-21    Name:  ALICIA BARBOUR; 61y (1960)     HPI:   61 year old male, unknown-handedness, with PMHx HTN, DM, presenting to the [facility] ED as a code stroke. Per EMS, patient c/o headache, nausea, and vomiting. Per EMS report, he apparently started acting "not quite right" around 08:30 AM. LKN is unknown. On EMS arrival at 09:39AM 11/4/21, patient seen talking a little, garbling, moving all extremities, then quickly became unresponsive. Per EMS, en route, patient with sinus bradycardia, also w/ /170. Code stroke called in the ED.     Review of Systems: unable to assess due to unresponsiveness     Allergy:  penicillin (Other)      PMHx: unable to assess due to unresponsiveness     PFHx: unable to assess due to unresponsiveness     PSuHx: unable to assess due to unresponsiveness       Medications:  MEDICATIONS  (STANDING):    MEDICATIONS  (PRN):      Vitals:  T(C): --  HR: --  BP: --  RR: --  SpO2: --    Physical Examination:    General: sedated, intubated, totally unresponsive  Head: NC/NT  Abdomen: soft, non-distended  Extremities: no lesions, no edema    Neurologic: limited due to sedation and unresponsiveness    - Mental Status: sedated, totally unresponsive    - Cranial Nerves: does not track, pupils 2mm b/l, cough reflex intact    -Motor/Strength Testing: seen moving LUE spontaneously, otherwise no spontaneous movement noted in other extremities, does not withdraw to pain.     - Sensory: limited examination due to sedation and unresponsiveness    Labs:                        12.9   15.29 )-----------( 322      ( 04 Nov 2021 10:16 )             40.5           CAPILLARY BLOOD GLUCOSE     Radiology:     Neurology  Consult Note  11-04-21    Name:  ALICIA BARBOUR; 61y (1960)     HPI:   61 year old male, unknown-handedness, with PMHx HTN, DM, presenting to the [facility] ED as a code stroke. Per EMS, patient c/o headache, nausea, and vomiting. Per EMS report, he apparently started acting "not quite right" around 08:30 AM. LKN is unknown. On EMS arrival at 09:39AM 11/4/21, patient seen talking a little, garbling, moving all extremities, then quickly became unresponsive. Per EMS, en route, patient with sinus bradycardia, also w/ /170. Code stroke called in the ED.     Review of Systems: unable to assess due to unresponsiveness     Allergy:  penicillin (Other)      PMHx: unable to assess due to unresponsiveness     PFHx: unable to assess due to unresponsiveness     PSuHx: unable to assess due to unresponsiveness       Medications:  MEDICATIONS  (STANDING):    MEDICATIONS  (PRN):      Vitals:  T(C): --  HR: --  BP: --  RR: --  SpO2: --    Physical Examination:    General: sedated, intubated, totally unresponsive  Head: NC/NT  Abdomen: soft, non-distended  Extremities: no lesions, no edema    Neurologic: limited due to sedation and unresponsiveness    - Mental Status: sedated, totally unresponsive    - Cranial Nerves: does not track, pupils 2mm b/l, cough reflex intact    -Motor/Strength Testing: seen moving LUE spontaneously, otherwise no spontaneous movement noted in other extremities, does not withdraw to pain.     - Sensory: limited examination due to sedation and unresponsiveness    Labs:                        12.9   15.29 )-----------( 322      ( 04 Nov 2021 10:16 )             40.5           CAPILLARY BLOOD GLUCOSE     Radiology:  < from: CT Angio Head w/ IV Cont (11.04.21 @ 12:56) >  IMPRESSION:    Brain CT without contrast:  Acute intraventricular hemorrhage in the bilateral lateral, third, and fourth ventricles. Ventricular dilatation suggesting hydrocephalus. Hypodensity in the periventricular white matter suggesting white matter microvascular ischemic disease versus transependymal flow of CSF. Subarachnoid hemorrhage in the posterior fossa. Effacement of basal cisterns.    CT perfusion:  CBF<30% volume: 5 ml, left corona radiata, suggesting core infarct.  Tmax>6.0s volume: 15 ml, in the region of the brainstem and cerebellum, suggesting hemorrhage versus ischemia.  Mismatch volume: n/a. Please note that core infarct volume does not overlap with ischemic penumbra volume.  Mismatch ratio: n/a    CT angiography neck: No hemodynamically significant stenosis of the bilateral cervical ICAs using NASCET criteria.  Patent vertebral arteries.  No evidence of vascular dissection.    CT angiography brain:  1.  A 4 mm rounded hyperdense focus in the intraventricular hemorrhage in the fourth ventricle suggesting active hemorrhage or aneurysm.  2.  No large vessel occlusion.    CT head and CTA head and neck findings were discussed with Dr. DAVIDA GARZA [id number] at 11/4/2021 10:56 AM by Dr. Josué Nuñez with read back confirmation.    < end of copied text >

## 2021-11-04 NOTE — H&P ADULT - HISTORY OF PRESENT ILLNESS
61M hx HTN, DM, cardiac stent on ASA. At work complaining of HA ~8:30, starting feeling dizzy and vomiting, HTN/keke when EMS got to him. SBP 220s, HR 50s.     On EMS arrival at 09:39AM 11/4/21, patient seen talking a little, garbling, moving all extremities, then quickly became unresponsive. No known blood thinners.    CTH shows large posterior fossa bleed w/ IVH/SAH/hydro.    Exam:  Pre/intubation exam: no eyes open, pupils 2b/l, + corneals/cough/gag, not FC, LUE spont fingers moving, flacid otherwise    ICU Vital Signs Last 24 Hrs  T(C): 34.8 (04 Nov 2021 12:00), Max: 34.8 (04 Nov 2021 12:00)  T(F): 94.6 (04 Nov 2021 12:00), Max: 94.6 (04 Nov 2021 12:00)  HR: 127 (04 Nov 2021 11:30) (109 - 146)  BP: 123/78 (04 Nov 2021 11:30) (123/78 - 245/142)  BP(mean): --  ABP: --  ABP(mean): --  RR: 19 (04 Nov 2021 11:30) (19 - 30)  SpO2: 100% (04 Nov 2021 11:30) (100% - 100%)  11-04    140  |  103  |  18  ----------------------------<  226<H>  3.3<L>   |  19<L>  |  1.24    Ca    9.5      04 Nov 2021 10:16    TPro  7.8  /  Alb  4.8  /  TBili  0.4  /  DBili  x   /  AST  18  /  ALT  14  /  AlkPhos  94  11-04                        12.9   15.29 )-----------( 322      ( 04 Nov 2021 10:16 )             40.5

## 2021-11-04 NOTE — H&P ADULT - NSHPPHYSICALEXAM_GEN_ALL_CORE
Pre/intubation exam: no eyes open, pupils 2b/l, + corneals/cough/gag, not FC, LUE spont fingers moving, flacid otherwise

## 2021-11-04 NOTE — PROGRESS NOTE ADULT - ASSESSMENT
ASSESSMENT/PLAN:    63M hx of HTN, DM p/w posterior fossa bleed likely 2/2 AVM vs. HTSV, intubated for airway protection now s/p EVD, SOC watch.    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 active hemorrhage, no LVO; concerning for AVM, though cannot rule out HTSV bleed given location and presented with SBP>200s.  - neuro checks q1  - EVD@15, monitor output, drain care, ICP<22  - repeat CTH AM  - pain control  - SOC watch    CVS:  - SBP goal   - cardene PRN to goal  - consider secondary HTSV w/u (RAAS, renin/marlena, metaneph)  - trend lactate  - TTE    PULM:  Intubated for airway protection  - ETT to vent  - vent bundle  - chest PT  - ABG daily, titrate vent settings; repeat ABG  - CXR    RENAL:  - Fluids: HTS2%@75  - BMPq6h  - Na goal 145-150  - trend lytes  - daily IOs    GI:  - Diet: NGT, keep NPO for SOC watch  - GI prophylaxis:   - Bowel regimen    ENDO:   - FS goal 120-180  - A1c    HEME/ONC:  - SCDs  - Chemoppx: hold d/t fresh bleed    ID:  - monitor for fevers      Patient is at high risk of neurologic deterioration/death due to:  brain herniation, heme expansion, cerebral edema, ICP crisis      Time spent: 35 critical care minutes ASSESSMENT/PLAN:    63M hx of HTN, DM p/w posterior fossa heme c/b IVH status post EVD, intubated for airway protection    NEURO:  CTA with hyperdense foci in 4th vent suggestive of aneurysm vs. active hemorrhage, possibly HTNive hemorrhage given location and presented with SBP>200s.  - neuro checks q1  - EVD@15, monitor output, drain care, ICP<22  - repeat CTH AM  - pain control  - SOC watch    CVS:  - SBP goal   - cardene PRN to goal  - consider secondary HTSV w/u (RAAS, renin/marlena, metaneph)  - trend lactate  - TTE  - afib rate control with diltizaem     PULM: resp failure requiring mech vent  Intubated for airway protection  - ETT to vent  - vent bundle  - chest PT  - ABG daily, titrate vent settings; repeat ABG  - CXR    RENAL:  - Fluids: HTS2%@75  - BMPq6h  - Na goal 145-150  - trend lytes  - daily IOs    GI:  - Diet: NGT, keep NPO for SOC watch  - GI prophylaxis:   - Bowel regimen    ENDO:   - FS goal 120-180  - A1c    HEME/ONC:  - SCDs  - Chemoppx: hold d/t fresh bleed    ID:  - monitor for fevers      Patient is at high risk of neurologic deterioration/death due to:  brain herniation, heme expansion, cerebral edema, ICP crisis      Time spent: 35 critical care minutes

## 2021-11-04 NOTE — CONSULT NOTE ADULT - TIME BILLING
61-year-old ? handed gentleman first evaluated at [facility] on 11/5/2021 with headache, vomiting and depressed level of consciousness.  History and exam as above.  ROS otherwise negative.  CT head (11/4/2021) to my eye showed a large posterior fossa hemorrhage with both IVH and SAH and significant hydrocephalus.  Cerebral angiogram (11/5/2021) to my eye showed a small focus of contrast in the posterior fossa, perhaps reflecting a left PICA aneurysm.    Impression.  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  Further management as per neurosurgery.

## 2021-11-04 NOTE — ED PROVIDER NOTE - CLINICAL SUMMARY MEDICAL DECISION MAKING FREE TEXT BOX
61 year old male, unknown-handedness, with PMHx HTN, DM presenting with acute change in mental status concerning for SAH/ICH  Emergently intubated  CTH STAT  NeuroSx consulted 61 year old male, unknown-handedness, with PMHx HTN, DM presenting with acute change in mental status concerning for SAH/ICH  Emergently intubated  CTH STAT  NeuroSx consulted  Attending Josee Valente: 62 yo male with h/o 61 year old male, unknown-handedness, with PMHx HTN, DM presenting with acute change in mental status concerning for SAH/ICH  Emergently intubated  CTH STAT  NeuroSx consulted  Attending Josee Valente: 62 yo male with h/o htn not reportedly on any anticoagulation presentigng remi chávez. per ems complaining of headache and nausea. upon arrival pt being bagged. neurosurgery called as high concern for intracranial hemorrhage. based on history and need for intubation. no reports of trauma and no evidence of trauma on exam. pt intubated for airway protection and started on cardene and propofol taken to ct scan with evidence of intraventricular hemorrhage. neurosrugery at bedside will try to tobain collateral. pt tba to icu critically ill

## 2021-11-04 NOTE — ED PROVIDER NOTE - PHYSICAL EXAMINATION
GENERAL:obtunded with sonorous respirations.   HEAD:  Atraumatic, Normocephalic  EYES: 2 mm pupils reactive   ENT: MMM; +oropharyngeal secretions.   NECK: Supple, No JVD  CHEST/LUNG: Clear to auscultation bilaterally; No wheeze  HEART: Regular rate and rhythm; No murmurs, rubs, or gallops  ABDOMEN: Soft, Nontender, Nondistended; Bowel sounds present  EXTREMITIES:  2+ Peripheral Pulses, No clubbing, cyanosis, or edema  SKIN: No rashes or lesions GENERAL:obtunded with sonorous respirations.   HEAD:  Atraumatic, Normocephalic  EYES: 2 mm pupils reactive   ENT: MMM; +oropharyngeal secretions.   NECK: Supple, No JVD  CHEST/LUNG: Clear to auscultation bilaterally; No wheeze  HEART: Regular rate and rhythm; No murmurs, rubs, or gallops  ABDOMEN: Soft, Nontender, Nondistended; Bowel sounds present  EXTREMITIES:  2+ Peripheral Pulses, No clubbing, cyanosis, or edema  SKIN: No rashes or lesions  Attending Josee Valente: Gen: ill appearing being bagged upon arrival, heent: atrauamtic, pupils 2mm chest: nttp, no crepitus, cv: tachcyardic, lungs: ctab, abd: soft, nontender, nondistended,, no guarding, ext: wwp, neg homans, skin: no rash, neuro: not following commands gcs 3t

## 2021-11-04 NOTE — CHART NOTE - NSCHARTNOTEFT_GEN_A_CORE
CAPRINI SCORE [CLOT] Score on Admission for     AGE RELATED RISK FACTORS                                                       MOBILITY RELATED FACTORS  [ ] Age 41-60 years                                            (1 Point)                  [ ] Bed rest                                                        (1 Point)  [x ] Age: 61-74 years                                           (2 Points)                 [ ] Plaster cast                                                   (2 Points)  [ ] Age= 75 years                                              (3 Points)                 [ ] Bed bound for more than 72 hours                 (2 Points)    DISEASE RELATED RISK FACTORS                                               GENDER SPECIFIC FACTORS  [ ] Edema in the lower extremities                       (1 Point)                  [ ] Pregnancy                                                     (1 Point)  [ ] Varicose veins                                               (1 Point)                  [ ] Post-partum < 6 weeks                                   (1 Point)             [ ] BMI > 25 Kg/m2                                            (1 Point)                  [ ] Hormonal therapy  or oral contraception          (1 Point)                 [ ] Sepsis (in the previous month)                        (1 Point)                  [ ] History of pregnancy complications                 (1 point)  [ ] Pneumonia or serious lung disease                                               [ ] Unexplained or recurrent                     (1 Point)           (in the previous month)                               (1 Point)  [ ] Abnormal pulmonary function test                     (1 Point)                 SURGERY RELATED RISK FACTORS (include planned surgeries)  [ ] Acute myocardial infarction                              (1 Point)                 [ ]  Section                                             (1 Point)  [ ] Congestive heart failure (in the previous month)  (1 Point)               [ ] Minor surgery                                                  (1 Point)   [ ] Inflammatory bowel disease                             (1 Point)                 [ ] Arthroscopic surgery                                        (2 Points)  [ ] Central venous access                                      (2 Points)                [ ] General surgery lasting more than 45 minutes   (2 Points)       [x ] Stroke (in the previous month)                          (5 Points)               [ ] Elective arthroplasty                                         (5 Points)            [ ] Current or past malignancy                                (2 Points)                                                                                                     HEMATOLOGY RELATED FACTORS                                                 TRAUMA RELATED RISK FACTORS  [ ] Prior episodes of VTE                                     (3 Points)                [ ] Fracture of the hip, pelvis, or leg                       (5 Points)  [ ] Positive family history for VTE                         (3 Points)                 [ ] Acute spinal cord injury (in the previous month)  (5 Points)  [ ] Prothrombin 60118 A                                     (3 Points)                 [ ] Paralysis  (less than 1 month)                             (5 Points)  [ ] Factor V Leiden                                             (3 Points)                  [ ] Multiple Trauma within 1 month                        (5 Points)  [ ] Lupus anticoagulants                                     (3 Points)                                                           [ ] Anticardiolipin antibodies                               (3 Points)                                                       [ ] High homocysteine in the blood                      (3 Points)                                             [ ] Other congenital or acquired thrombophilia      (3 Points)                                                [ ] Heparin induced thrombocytopenia                  (3 Points)                                          Total Score [     7     ]    Risk:  Very low 0   Low 1 to 2   Moderate 3 to 4   High =5       VTE Prophylasix Recommednations:  [x ] mechanical pneumatic compression devices                                      [ ] contraindicated: _____________________  [ ] chemo prophylasix                                                                                   [ ] contraindicated _____________________    **** HIGH LIKELIHOOD DVT PRESENT ON ADMISSION  [ x] (please order LE dopplers within 24 hours of admission)

## 2021-11-04 NOTE — ED PROVIDER NOTE - ATTENDING CONTRIBUTION TO CARE
Attending MD Josee Valente:  I personally have seen and examined this patient.  Resident note reviewed and agree on plan of care and except where noted.  See HPI, PE, and MDM for details.

## 2021-11-04 NOTE — PROGRESS NOTE ADULT - SUBJECTIVE AND OBJECTIVE BOX
NSCU Progress Note    Assessment/Hospital Course:    61M hx of HTN, DM, who was BIBEMS as a code stroke w/ LKW ~0830 am when he c/o HA with n/v, EMS activated found patient to be incoherent, hypertensive 220s and bradycardic 50s and ,eventually unresponsive in ED requiring intubation for GCS/Airway protection, CTH with large posterior fossa bleed w/ IVH, CTA w/ no LVO, large hyperdense focus in 4th vent suggesting active bleed vs. aneurysm, in ED with poor exam, EVD placed @15 adn admitted to NSICU for further management of AVM vs. HTSV ICH.  11/4: Admitted. intubated for GCS/Airway protection. EVD placed in ED    24 Hour Events/Subjective:  - Admitted. intubated for GCS/Airway protection. EVD placed in ED      REVIEW OF SYSTEMS:  - negative except as above    VITALS:   - Reviewed    IMAGING/DATA:   - Reviewed     ALLERGIES:   - penicillin (Other)        PHYSICAL EXAM:    General: calm  CVS: RRR  Pulm: CTAB  GI: Soft, NTND  Extremities: No LE Edema  Neuro: AOx3, PERRL, EOMI, facial symmetrical, fluent speech, motor 5/5 throughout, no PND, sensation in tact   NSCU Progress Note    Assessment/Hospital Course:    61M hx of HTN, DM, who was BIBEMS as a code stroke w/ LKW ~0830 am when he c/o HA with n/v, EMS activated found patient to be incoherent, hypertensive 220s and bradycardic 50s and ,eventually unresponsive in ED requiring intubation for GCS/Airway protection, CTH with large posterior fossa bleed w/ IVH, CTA w/ no LVO, large hyperdense focus in 4th vent suggesting active bleed vs. aneurysm, in ED with poor exam, EVD placed @15 adn admitted to NSICU for further management of AVM vs. HTSV ICH.  11/4: Admitted. intubated for GCS/Airway protection. EVD placed in ED    24 Hour Events/Subjective:  - Admitted. intubated for GCS/Airway protection. EVD placed in ED. Afib with RVR.       REVIEW OF SYSTEMS:  - unable to obtain given mental status    VITALS:   - Reviewed    IMAGING/DATA:   - Reviewed     ALLERGIES:   - penicillin (Other)        PHYSICAL EXAM:    General: intubated  CVS: RRR  Pulm: CTAB  GI: Soft, NTND  Extremities: No LE Edema  Neuro: Eyes closed, no commands, pupils 2mm unreactive, absent corneals, +weak cough, overbreathes, localizes arms L>R, weakly w/d legs

## 2021-11-04 NOTE — ED ADULT NURSE NOTE - NSIMPLEMENTINTERV_GEN_ALL_ED
Implemented All Fall Risk Interventions:  [place] to call system. Call bell, personal items and telephone within reach. Instruct patient to call for assistance. Room bathroom lighting operational. Non-slip footwear when patient is off stretcher. Physically safe environment: no spills, clutter or unnecessary equipment. Stretcher in lowest position, wheels locked, appropriate side rails in place. Provide visual cue, wrist band, yellow gown, etc. Monitor gait and stability. Monitor for mental status changes and reorient to person, place, and time. Review medications for side effects contributing to fall risk. Reinforce activity limits and safety measures with patient and family.

## 2021-11-04 NOTE — PROGRESS NOTE ADULT - ATTENDING COMMENTS
Patient seen and examined by attending on 11/4/2021.    Patient is critically ill due to ICH with IVH and at high risk for neurological deterioration or death due to: hydrocephalus, brainstem compression, respiratory failure requiring intubation

## 2021-11-04 NOTE — PROGRESS NOTE ADULT - SUBJECTIVE AND OBJECTIVE BOX
24 Hour events: 11/4: Admitted. intubated for GCS/Airway protection. EVD placed in ED, taken to OR for SOC emergent. Had IO placed and started on dilt for afib rvr. Seen postop on cardene gtt and no sedationpuneet in    24 Hour Events/Subjective:  - Admitted. intubated for GCS/Airway protection. EVD placed in ED. Afib with RVR.       REVIEW OF SYSTEMS:  - unable to obtain given mental status    VITALS:   - Reviewed    ICU Vital Signs Last 24 Hrs  T(C): 37.3 (04 Nov 2021 21:30), Max: 37.3 (04 Nov 2021 21:30)  T(F): 99.2 (04 Nov 2021 21:30), Max: 99.2 (04 Nov 2021 21:30)  HR: 70 (04 Nov 2021 23:00) (70 - 153)  BP: 122/67 (04 Nov 2021 23:00) (100/80 - 245/142)  BP(mean): 82 (04 Nov 2021 23:00) (82 - 114)  ABP: 137/50 (04 Nov 2021 23:00) (122/65 - 181/64)  ABP(mean): 73 (04 Nov 2021 23:00) (73 - 106)  RR: 17 (04 Nov 2021 23:00) (15 - 38)  SpO2: 100% (04 Nov 2021 23:00) (97% - 100%)    I&O's Summary    04 Nov 2021 07:01  -  04 Nov 2021 23:37  --------------------------------------------------------  IN: 245.4 mL / OUT: 2258 mL / NET: -2012.6 mL        IMAGING/DATA:   - Reviewed     ALLERGIES:   - penicillin (Other)      PHYSICAL EXAM:    General: intubated  CVS: RRR  Pulm: CTAB  GI: Soft, NTND  Extremities: No LE Edema  Neuro: Intubated, off sedation no EO, does not track, no commands, pupils 2mm bilateral,  +corneals bilat, no dolls, no cough or gag, biting ett, overbreathes, RUE 2/5, LUE 1/5 noxious, BLE WD                           12.9   15.29 )-----------( 322      ( 04 Nov 2021 10:16 )             40.5   11-04    140  |  103  |  18  ----------------------------<  226<H>  3.3<L>   |  19<L>  |  1.24    Ca    9.5      04 Nov 2021 10:16    TPro  7.8  /  Alb  4.8  /  TBili  0.4  /  DBili  x   /  AST  18  /  ALT  14  /  AlkPhos  94  11-04   24 Hour events: 11/4: Admitted. intubated for GCS/Airway protection. EVD placed in ED, taken to OR for SOC emergent. Had IO placed and started on dilt for afib rvr. Seen postop on cardene gtt and no sedationpuneet in    REVIEW OF SYSTEMS:  - unable to obtain given mental status, intubated    VITALS:   - Reviewed    ICU Vital Signs Last 24 Hrs  T(C): 37.3 (04 Nov 2021 21:30), Max: 37.3 (04 Nov 2021 21:30)  T(F): 99.2 (04 Nov 2021 21:30), Max: 99.2 (04 Nov 2021 21:30)  HR: 70 (04 Nov 2021 23:00) (70 - 153)  BP: 122/67 (04 Nov 2021 23:00) (100/80 - 245/142)  BP(mean): 82 (04 Nov 2021 23:00) (82 - 114)  ABP: 137/50 (04 Nov 2021 23:00) (122/65 - 181/64)  ABP(mean): 73 (04 Nov 2021 23:00) (73 - 106)  RR: 17 (04 Nov 2021 23:00) (15 - 38)  SpO2: 100% (04 Nov 2021 23:00) (97% - 100%)    I&O's Summary    04 Nov 2021 07:01  -  04 Nov 2021 23:37  --------------------------------------------------------  IN: 245.4 mL / OUT: 2258 mL / NET: -2012.6 mL        IMAGING/DATA:   - Reviewed     ALLERGIES:   - penicillin (Other)      PHYSICAL EXAM:    General: intubated  CVS: RRR  Pulm: CTAB  GI: Soft, NTND  Extremities: No LE Edema  Neuro: Intubated, off sedation no EO, does not track, no commands, pupils 2mm bilateral,  +corneals bilat, no dolls, no cough or gag, biting ett, overbreathes, RUE 2/5, LUE 1/5 noxious, BLE WD                           12.9   15.29 )-----------( 322      ( 04 Nov 2021 10:16 )             40.5   11-04    140  |  103  |  18  ----------------------------<  226<H>  3.3<L>   |  19<L>  |  1.24    Ca    9.5      04 Nov 2021 10:16    TPro  7.8  /  Alb  4.8  /  TBili  0.4  /  DBili  x   /  AST  18  /  ALT  14  /  AlkPhos  94  11-04   24 Hour events: 11/4: Admitted. intubated for GCS/Airway protection. EVD placed in ED, taken to OR for SOC emergent. Had IO placed and started on dilt for afib rvr. Seen postop on cardene gtt and no sedationpuneet in    REVIEW OF SYSTEMS:  - unable to obtain given mental status, intubated    VITALS:   - Reviewed    ICU Vital Signs Last 24 Hrs  T(C): 37.3 (04 Nov 2021 21:30), Max: 37.3 (04 Nov 2021 21:30)  T(F): 99.2 (04 Nov 2021 21:30), Max: 99.2 (04 Nov 2021 21:30)  HR: 70 (04 Nov 2021 23:00) (70 - 153)  BP: 122/67 (04 Nov 2021 23:00) (100/80 - 245/142)  BP(mean): 82 (04 Nov 2021 23:00) (82 - 114)  ABP: 137/50 (04 Nov 2021 23:00) (122/65 - 181/64)  ABP(mean): 73 (04 Nov 2021 23:00) (73 - 106)  RR: 17 (04 Nov 2021 23:00) (15 - 38)  SpO2: 100% (04 Nov 2021 23:00) (97% - 100%)    I&O's Summary    04 Nov 2021 07:01  -  04 Nov 2021 23:37  --------------------------------------------------------  IN: 245.4 mL / OUT: 2258 mL / NET: -2012.6 mL        IMAGING/DATA:   - Reviewed     ALLERGIES:   - penicillin (Other)      PHYSICAL EXAM:    General: intubated  CVS: RRR (no longer in afib)  Pulm: CTAB  GI: Soft, NTND  Extremities: No LE Edema  Neuro: Intubated, off sedation no EO, does not track, no commands, pupils 2mm bilateral,  +corneals bilat, no dolls, no cough or gag, biting ett, overbreathes, RUE 2/5, LUE 1/5 noxious, BLE WD                           12.9   15.29 )-----------( 322      ( 04 Nov 2021 10:16 )             40.5   11-04    140  |  103  |  18  ----------------------------<  226<H>  3.3<L>   |  19<L>  |  1.24    Ca    9.5      04 Nov 2021 10:16    TPro  7.8  /  Alb  4.8  /  TBili  0.4  /  DBili  x   /  AST  18  /  ALT  14  /  AlkPhos  94  11-04

## 2021-11-04 NOTE — PROGRESS NOTE ADULT - ASSESSMENT
ASSESSMENT/PLAN:    63M hx of HTN, DM p/w posterior fossa heme c/b IVH status post EVD, intubated for airway protection    NEURO:  CTA with hyperdense foci in 4th vent suggestive of aneurysm vs. active hemorrhage, possibly HTNive hemorrhage given location and presented with SBP>200s.  - neuro checks q1  - EVD@15, monitor output, drain care, ICP<22  - repeat CTH AM, angio AM  - pain control fent pushes 25 q2 PRN, precedex PRN agitation    CVS:  - SBP goal   - cardene PRN to goal, currently max, start labetalol 100 q8h, avoid hydralazine (rises ICP)  - consider secondary HTSV w/u (RAAS, renin/marlena, metaneph)  - trend lactate  - TTE, trop negat  - afib rate control earlier with diltiazem, monitor, a line    PULM: resp failure requiring mech vent  Intubated for airway protection  - ETT to vent, check abg  - vent bundle  - chest PT  - CXR    RENAL:  - BMP check now and will adjust as needed  - Na goal 145-155  - trend lytes  - daily IOs  Remove teixeira tomorrow    GI:  - Diet: NGT, NPO for now  - GI prophylaxis: PPI  - Bowel regimen senna miralax    ENDO:   - FS goal 120-180  - A1c 6.5  gluc checks q6  ISS    HEME/ONC:  - SCDs  - Chemoppx: hold d/t fresh bleed    ID:  - monitor for fevers ASSESSMENT/PLAN:    63M w HTN, DM p/w posterior fossa heme c/b IVH status post EVD, intubated for airway protection, etiology unclear, poss aneurysm vs mass w HTN    NEURO:  - neuro checks q1  - EVD@15, monitor output, drain care, ICP<22  - repeat CTH AM, preop for angio AM  - pain control fent pushes 25 q2 PRN, precedex PRN agitation    CVS:  - SBP goal   - cardene PRN to goal, currently max, start labetalol 100 q8h, avoid hydralazine (rises ICP)  - consider secondary HTSV w/u (RAAS, renin/marlena, metaneph)  - TTE, trop negat  - afib rate control earlier with diltiazem, monitor, a line    PULM: resp failure requiring mech vent  Intubated for airway protection  - ETT to vent, check abg  - vent bundle  - chest PT  - CXR reviewed    RENAL:  -  start HTS 2% 75  - Na goal 145-155  - trend lytes  - daily IOs  Remove teixeira tomorrow    GI:  - Diet: NGT, NPO for now  - GI prophylaxis: PPI  - Bowel regimen senna miralax    ENDO:   - FS goal 120-180  - A1c 6.5  gluc checks q6  ISS    HEME/ONC:  - SCDs  - Chemoppx: hold d/t fresh bleed    ID:  - monitor for fevers 63M w HTN, DM p/w posterior fossa hemorrhage c/b severe IVH with casting of the 4th and 3rd ventricles and hydrocephalus, status post EVD and SOC, etiology unclear, possible underlying vascular malformation vs mass w HTN.    NEURO:  - neuro checks q1  - EVD@15, monitor output, drain care, ICP<22  - repeat CTH AM, preop for angio AM  - pain control fent pushes 25 q2 PRN, precedex PRN agitation    CVS:  - SBP goal   - cardene PRN to goal, currently max, start labetalol 100 q8h, avoid hydralazine (rises ICP)  - consider secondary HTSV w/u (RAAS, renin/marlena, metaneph)  - TTE, trop negat  - afib rate control earlier with diltiazem, monitor, a line    PULM: resp failure requiring mech vent  Intubated for airway protection  - ETT to vent, check abg  - vent bundle  - chest PT  - CXR reviewed    RENAL:  -  start HTS 2% 75  - Na goal 145-155  - trend lytes  - daily IOs  Remove teixeira tomorrow    GI:  - Diet: NGT, NPO for now  - GI prophylaxis: PPI  - Bowel regimen senna miralax    ENDO:   - FS goal 120-180  - A1c 6.5  gluc checks q6  ISS    HEME/ONC:  - SCDs  - Chemoppx: hold d/t fresh bleed    ID:  - monitor for fevers

## 2021-11-04 NOTE — ED PROVIDER NOTE - PROGRESS NOTE DETAILS
Patient intubated emergently for airway protection with suspicion of ICH/SAH Attending Josee Valente: pt found to have large ICH. neurosurgery called upon arrival to the ED for concern for headbleed. pt on proprol and cardene for hypertension. no known anticoagulation.

## 2021-11-04 NOTE — PROGRESS NOTE ADULT - ATTENDING COMMENTS
Afib with RVR unresponsive to metoprolol. Slowed with IVP of diltiazem, so started on diltiazem gtt. Taken to OR for SOC.

## 2021-11-04 NOTE — CONSULT NOTE ADULT - ASSESSMENT
INCOMPLETE  Assessment: ***    Impression: ***    Plan:   -*** 61 year old male, unknown-handedness, with PMHx HTN, DM, presenting to the [facility] ED as a code stroke. Per EMS, patient c/o headache, nausea, and vomiting. Per EMS report, he apparently started acting "not quite right" around 08:30 AM. LKN is unknown. On EMS arrival at 09:39AM 11/4/21, patient seen talking a little, garbling, moving all extremities, then quickly became unresponsive. Per EMS, en route, patient with sinus bradycardia, also w/ /170. Code stroke called in the ED. Patient sedated and intubated. Brain CT w/ acute IVH in b/l lateral, third, and fourth ventricles, hypodensities c/w microvascular ischemic disease vs transependymal flow of CSF, SAH in posterior fossa. CT angiography brain w/ 4 mm hyperdense focus in the fourth ventricle suggestive of active hemorrhage or aneurysm, no LVO.    LKW: unknown (acting "not quite right" around 8:30AM 11/4/21, per EMS report)  NIHSS: ***  mRS: 0    tPA not offered -- patient w/ hemorrhagic process  Thrombectomy not performed -- no LVO    Impression: ***    Plan:   -*** 61 year old male, unknown-handedness, with PMHx HTN, DM, presenting to the [facility] ED as a code stroke. Per EMS, patient c/o headache, nausea, and vomiting. Per EMS report, he apparently started acting "not quite right" around 08:30 AM. LKN is unknown. On EMS arrival at 09:39AM 11/4/21, patient seen talking a little, garbling, moving all extremities, then quickly became unresponsive. Per EMS, en route, patient with sinus bradycardia, also w/ /170. Code stroke called in the ED. Patient sedated and intubated. Brain CT w/ acute IVH in b/l lateral, third, and fourth ventricles, hypodensities c/w microvascular ischemic disease vs transependymal flow of CSF, SAH in posterior fossa. CT angiography brain w/ 4 mm hyperdense focus in the fourth ventricle suggestive of active hemorrhage or aneurysm, no LVO. Patient taken for NSCU for further management.    LKW: unknown (acting "not quite right" around 8:30AM 11/4/21, per EMS report)  NIHSS: 28 (NIHSS scored s/p sedation, paralytics, intubation)  mRS: 0    tPA not offered -- patient w/ hemorrhagic process  Thrombectomy not performed -- no LVO    Impression: unresponsiveness a/w posterior fossa hemorrhage w/ severe IVH w/ hydrocephalus, CTA suggestive of vascular malformation     Plan:   -hemorrhagic stroke management as per Neurosurgery and NSCU    * Case and plan discussed with Stroke Fellow Anh Vaughn MD *     61 year old male, unknown-handedness, with PMHx HTN, DM, presenting to the [facility] ED as a code stroke. Per EMS, patient c/o headache, nausea, and vomiting. Per EMS report, he apparently started acting "not quite right" around 08:30 AM. LKN is unknown. On EMS arrival at 09:39AM 11/4/21, patient seen talking a little, garbling, moving all extremities, then quickly became unresponsive. Per EMS, en route, patient with sinus bradycardia, also w/ /170. Code stroke called in the ED. Patient sedated and intubated. Brain CT w/ acute IVH in b/l lateral, third, and fourth ventricles, hypodensities c/w microvascular ischemic disease vs transependymal flow of CSF, SAH in posterior fossa. CT angiography brain w/ 4 mm hyperdense focus in the fourth ventricle suggestive of active hemorrhage or aneurysm, no LVO. Patient taken for NSCU for further management.    LKW: unknown (acting "not quite right" around 8:30AM 11/4/21, per EMS report)  NIHSS: 28 (NIHSS scored s/p sedation, paralytics, intubation)  mRS: 0    tPA not offered -- patient w/ hemorrhagic process  Thrombectomy not performed -- no LVO    Impression: unresponsiveness a/w posterior fossa hemorrhage w/ severe IVH w/ hydrocephalus, CTA suggestive of vascular malformation     Plan:   -hemorrhagic stroke management as per Neurosurgery and NSCU  -consider MRI brain, when feasible    * Case and plan discussed with Stroke Fellow Anh Vaughn MD *

## 2021-11-04 NOTE — ED PROCEDURE NOTE - PROCEDURE ADDITIONAL DETAILS
Emergency Department Focused Ultrasound performed at patient's bedside for educational purposes. An appropriate follow up study is ordered. -Joseph Randolph PA-C

## 2021-11-05 ENCOUNTER — APPOINTMENT (OUTPATIENT)
Dept: NEUROSURGERY | Facility: HOSPITAL | Age: 61
End: 2021-11-05

## 2021-11-05 LAB
ANION GAP SERPL CALC-SCNC: 11 MMOL/L — SIGNIFICANT CHANGE UP (ref 5–17)
ANION GAP SERPL CALC-SCNC: 14 MMOL/L — SIGNIFICANT CHANGE UP (ref 5–17)
ANION GAP SERPL CALC-SCNC: 9 MMOL/L — SIGNIFICANT CHANGE UP (ref 5–17)
BUN SERPL-MCNC: 22 MG/DL — SIGNIFICANT CHANGE UP (ref 7–23)
BUN SERPL-MCNC: 24 MG/DL — HIGH (ref 7–23)
BUN SERPL-MCNC: 27 MG/DL — HIGH (ref 7–23)
CALCIUM SERPL-MCNC: 7.9 MG/DL — LOW (ref 8.4–10.5)
CALCIUM SERPL-MCNC: 8.1 MG/DL — LOW (ref 8.4–10.5)
CALCIUM SERPL-MCNC: 8.4 MG/DL — SIGNIFICANT CHANGE UP (ref 8.4–10.5)
CHLORIDE SERPL-SCNC: 106 MMOL/L — SIGNIFICANT CHANGE UP (ref 96–108)
CHLORIDE SERPL-SCNC: 110 MMOL/L — HIGH (ref 96–108)
CHLORIDE SERPL-SCNC: 116 MMOL/L — HIGH (ref 96–108)
CHOLEST SERPL-MCNC: 154 MG/DL — SIGNIFICANT CHANGE UP
CO2 SERPL-SCNC: 20 MMOL/L — LOW (ref 22–31)
CO2 SERPL-SCNC: 20 MMOL/L — LOW (ref 22–31)
CO2 SERPL-SCNC: 21 MMOL/L — LOW (ref 22–31)
COVID-19 NUCLEOCAPSID GAM AB INTERP: NEGATIVE — SIGNIFICANT CHANGE UP
COVID-19 NUCLEOCAPSID TOTAL GAM ANTIBODY RESULT: 0.26 INDEX — SIGNIFICANT CHANGE UP
COVID-19 SPIKE DOMAIN AB INTERP: POSITIVE
COVID-19 SPIKE DOMAIN ANTIBODY RESULT: >250 U/ML — HIGH
CREAT SERPL-MCNC: 1.24 MG/DL — SIGNIFICANT CHANGE UP (ref 0.5–1.3)
CREAT SERPL-MCNC: 1.37 MG/DL — HIGH (ref 0.5–1.3)
CREAT SERPL-MCNC: 1.44 MG/DL — HIGH (ref 0.5–1.3)
GLUCOSE SERPL-MCNC: 142 MG/DL — HIGH (ref 70–99)
GLUCOSE SERPL-MCNC: 152 MG/DL — HIGH (ref 70–99)
GLUCOSE SERPL-MCNC: 272 MG/DL — HIGH (ref 70–99)
HCT VFR BLD CALC: 26.2 % — LOW (ref 39–50)
HCT VFR BLD CALC: 28 % — LOW (ref 39–50)
HCT VFR BLD CALC: 29.1 % — LOW (ref 39–50)
HCT VFR BLD CALC: 33.8 % — LOW (ref 39–50)
HCV AB S/CO SERPL IA: 0.09 S/CO — SIGNIFICANT CHANGE UP (ref 0–0.99)
HCV AB SERPL-IMP: SIGNIFICANT CHANGE UP
HDLC SERPL-MCNC: 44 MG/DL — SIGNIFICANT CHANGE UP
HGB BLD-MCNC: 11.3 G/DL — LOW (ref 13–17)
HGB BLD-MCNC: 8.7 G/DL — LOW (ref 13–17)
HGB BLD-MCNC: 9.4 G/DL — LOW (ref 13–17)
HGB BLD-MCNC: 9.6 G/DL — LOW (ref 13–17)
LIPID PNL WITH DIRECT LDL SERPL: 95 MG/DL — SIGNIFICANT CHANGE UP
MAGNESIUM SERPL-MCNC: 1.6 MG/DL — SIGNIFICANT CHANGE UP (ref 1.6–2.6)
MAGNESIUM SERPL-MCNC: 2.4 MG/DL — SIGNIFICANT CHANGE UP (ref 1.6–2.6)
MAGNESIUM SERPL-MCNC: 2.4 MG/DL — SIGNIFICANT CHANGE UP (ref 1.6–2.6)
MCHC RBC-ENTMCNC: 30.1 PG — SIGNIFICANT CHANGE UP (ref 27–34)
MCHC RBC-ENTMCNC: 30.4 PG — SIGNIFICANT CHANGE UP (ref 27–34)
MCHC RBC-ENTMCNC: 30.4 PG — SIGNIFICANT CHANGE UP (ref 27–34)
MCHC RBC-ENTMCNC: 30.6 PG — SIGNIFICANT CHANGE UP (ref 27–34)
MCHC RBC-ENTMCNC: 33 GM/DL — SIGNIFICANT CHANGE UP (ref 32–36)
MCHC RBC-ENTMCNC: 33.2 GM/DL — SIGNIFICANT CHANGE UP (ref 32–36)
MCHC RBC-ENTMCNC: 33.4 GM/DL — SIGNIFICANT CHANGE UP (ref 32–36)
MCHC RBC-ENTMCNC: 33.6 GM/DL — SIGNIFICANT CHANGE UP (ref 32–36)
MCV RBC AUTO: 90.1 FL — SIGNIFICANT CHANGE UP (ref 80–100)
MCV RBC AUTO: 90.6 FL — SIGNIFICANT CHANGE UP (ref 80–100)
MCV RBC AUTO: 91.6 FL — SIGNIFICANT CHANGE UP (ref 80–100)
MCV RBC AUTO: 92.7 FL — SIGNIFICANT CHANGE UP (ref 80–100)
MRSA PCR RESULT.: SIGNIFICANT CHANGE UP
NON HDL CHOLESTEROL: 110 MG/DL — SIGNIFICANT CHANGE UP
NRBC # BLD: 0 /100 WBCS — SIGNIFICANT CHANGE UP (ref 0–0)
PHOSPHATE SERPL-MCNC: 3.3 MG/DL — SIGNIFICANT CHANGE UP (ref 2.5–4.5)
PHOSPHATE SERPL-MCNC: 3.7 MG/DL — SIGNIFICANT CHANGE UP (ref 2.5–4.5)
PHOSPHATE SERPL-MCNC: 4.4 MG/DL — SIGNIFICANT CHANGE UP (ref 2.5–4.5)
PLATELET # BLD AUTO: 214 K/UL — SIGNIFICANT CHANGE UP (ref 150–400)
PLATELET # BLD AUTO: 219 K/UL — SIGNIFICANT CHANGE UP (ref 150–400)
PLATELET # BLD AUTO: 242 K/UL — SIGNIFICANT CHANGE UP (ref 150–400)
PLATELET # BLD AUTO: 274 K/UL — SIGNIFICANT CHANGE UP (ref 150–400)
POTASSIUM SERPL-MCNC: 4.1 MMOL/L — SIGNIFICANT CHANGE UP (ref 3.5–5.3)
POTASSIUM SERPL-MCNC: 4.2 MMOL/L — SIGNIFICANT CHANGE UP (ref 3.5–5.3)
POTASSIUM SERPL-MCNC: 4.4 MMOL/L — SIGNIFICANT CHANGE UP (ref 3.5–5.3)
POTASSIUM SERPL-SCNC: 4.1 MMOL/L — SIGNIFICANT CHANGE UP (ref 3.5–5.3)
POTASSIUM SERPL-SCNC: 4.2 MMOL/L — SIGNIFICANT CHANGE UP (ref 3.5–5.3)
POTASSIUM SERPL-SCNC: 4.4 MMOL/L — SIGNIFICANT CHANGE UP (ref 3.5–5.3)
RBC # BLD: 2.86 M/UL — LOW (ref 4.2–5.8)
RBC # BLD: 3.09 M/UL — LOW (ref 4.2–5.8)
RBC # BLD: 3.14 M/UL — LOW (ref 4.2–5.8)
RBC # BLD: 3.75 M/UL — LOW (ref 4.2–5.8)
RBC # FLD: 13.8 % — SIGNIFICANT CHANGE UP (ref 10.3–14.5)
RBC # FLD: 14.3 % — SIGNIFICANT CHANGE UP (ref 10.3–14.5)
RBC # FLD: 14.6 % — HIGH (ref 10.3–14.5)
RBC # FLD: 14.6 % — HIGH (ref 10.3–14.5)
S AUREUS DNA NOSE QL NAA+PROBE: SIGNIFICANT CHANGE UP
SARS-COV-2 IGG+IGM SERPL QL IA: 0.26 INDEX — SIGNIFICANT CHANGE UP
SARS-COV-2 IGG+IGM SERPL QL IA: >250 U/ML — HIGH
SARS-COV-2 IGG+IGM SERPL QL IA: NEGATIVE — SIGNIFICANT CHANGE UP
SARS-COV-2 IGG+IGM SERPL QL IA: POSITIVE
SODIUM SERPL-SCNC: 140 MMOL/L — SIGNIFICANT CHANGE UP (ref 135–145)
SODIUM SERPL-SCNC: 142 MMOL/L — SIGNIFICANT CHANGE UP (ref 135–145)
SODIUM SERPL-SCNC: 145 MMOL/L — SIGNIFICANT CHANGE UP (ref 135–145)
TRIGL SERPL-MCNC: 73 MG/DL — SIGNIFICANT CHANGE UP
TSH SERPL-MCNC: 1.09 UIU/ML — SIGNIFICANT CHANGE UP (ref 0.27–4.2)
WBC # BLD: 15.12 K/UL — HIGH (ref 3.8–10.5)
WBC # BLD: 15.13 K/UL — HIGH (ref 3.8–10.5)
WBC # BLD: 17.35 K/UL — HIGH (ref 3.8–10.5)
WBC # BLD: 18.36 K/UL — HIGH (ref 3.8–10.5)
WBC # FLD AUTO: 15.12 K/UL — HIGH (ref 3.8–10.5)
WBC # FLD AUTO: 15.13 K/UL — HIGH (ref 3.8–10.5)
WBC # FLD AUTO: 17.35 K/UL — HIGH (ref 3.8–10.5)
WBC # FLD AUTO: 18.36 K/UL — HIGH (ref 3.8–10.5)

## 2021-11-05 PROCEDURE — 36227 PLACE CATH XTRNL CAROTID: CPT | Mod: 58

## 2021-11-05 PROCEDURE — 99223 1ST HOSP IP/OBS HIGH 75: CPT

## 2021-11-05 PROCEDURE — 36228 PLACE CATH INTRACRANIAL ART: CPT | Mod: 58,LT

## 2021-11-05 PROCEDURE — 71045 X-RAY EXAM CHEST 1 VIEW: CPT | Mod: 26

## 2021-11-05 PROCEDURE — 99291 CRITICAL CARE FIRST HOUR: CPT

## 2021-11-05 PROCEDURE — 76377 3D RENDER W/INTRP POSTPROCES: CPT | Mod: 26

## 2021-11-05 PROCEDURE — 36224 PLACE CATH CAROTD ART: CPT | Mod: 50,58

## 2021-11-05 PROCEDURE — 36226 PLACE CATH VERTEBRAL ART: CPT | Mod: 50,58

## 2021-11-05 RX ORDER — INSULIN LISPRO 100/ML
VIAL (ML) SUBCUTANEOUS EVERY 6 HOURS
Refills: 0 | Status: DISCONTINUED | OUTPATIENT
Start: 2021-11-05 | End: 2021-11-13

## 2021-11-05 RX ORDER — MAGNESIUM SULFATE 500 MG/ML
2 VIAL (ML) INJECTION ONCE
Refills: 0 | Status: COMPLETED | OUTPATIENT
Start: 2021-11-05 | End: 2021-11-05

## 2021-11-05 RX ORDER — SODIUM CHLORIDE 5 G/100ML
1000 INJECTION, SOLUTION INTRAVENOUS
Refills: 0 | Status: DISCONTINUED | OUTPATIENT
Start: 2021-11-05 | End: 2021-11-06

## 2021-11-05 RX ORDER — INFLUENZA VIRUS VACCINE 15; 15; 15; 15 UG/.5ML; UG/.5ML; UG/.5ML; UG/.5ML
0.5 SUSPENSION INTRAMUSCULAR ONCE
Refills: 0 | Status: DISCONTINUED | OUTPATIENT
Start: 2021-11-05 | End: 2021-11-20

## 2021-11-05 RX ORDER — INSULIN LISPRO 100/ML
VIAL (ML) SUBCUTANEOUS
Refills: 0 | Status: DISCONTINUED | OUTPATIENT
Start: 2021-11-05 | End: 2021-11-05

## 2021-11-05 RX ADMIN — FENTANYL CITRATE 25 MICROGRAM(S): 50 INJECTION INTRAVENOUS at 18:10

## 2021-11-05 RX ADMIN — Medication 100 MILLIGRAM(S): at 05:32

## 2021-11-05 RX ADMIN — SODIUM CHLORIDE 100 MILLILITER(S): 5 INJECTION, SOLUTION INTRAVENOUS at 14:17

## 2021-11-05 RX ADMIN — FENTANYL CITRATE 25 MICROGRAM(S): 50 INJECTION INTRAVENOUS at 01:40

## 2021-11-05 RX ADMIN — POLYETHYLENE GLYCOL 3350 17 GRAM(S): 17 POWDER, FOR SOLUTION ORAL at 21:18

## 2021-11-05 RX ADMIN — Medication 100 MILLIGRAM(S): at 21:18

## 2021-11-05 RX ADMIN — SODIUM CHLORIDE 100 MILLILITER(S): 5 INJECTION, SOLUTION INTRAVENOUS at 21:18

## 2021-11-05 RX ADMIN — Medication 100 MILLIGRAM(S): at 13:08

## 2021-11-05 RX ADMIN — FENTANYL CITRATE 25 MICROGRAM(S): 50 INJECTION INTRAVENOUS at 01:55

## 2021-11-05 RX ADMIN — FENTANYL CITRATE 25 MICROGRAM(S): 50 INJECTION INTRAVENOUS at 00:09

## 2021-11-05 RX ADMIN — Medication 50 GRAM(S): at 02:24

## 2021-11-05 RX ADMIN — Medication 100 MILLIGRAM(S): at 00:19

## 2021-11-05 RX ADMIN — PANTOPRAZOLE SODIUM 40 MILLIGRAM(S): 20 TABLET, DELAYED RELEASE ORAL at 11:08

## 2021-11-05 RX ADMIN — Medication 8: at 05:31

## 2021-11-05 RX ADMIN — Medication 100 MILLIGRAM(S): at 10:01

## 2021-11-05 RX ADMIN — FENTANYL CITRATE 25 MICROGRAM(S): 50 INJECTION INTRAVENOUS at 18:25

## 2021-11-05 RX ADMIN — Medication 100 MILLIGRAM(S): at 02:06

## 2021-11-05 RX ADMIN — Medication 4: at 11:14

## 2021-11-05 RX ADMIN — Medication 4: at 00:25

## 2021-11-05 RX ADMIN — CHLORHEXIDINE GLUCONATE 1 APPLICATION(S): 213 SOLUTION TOPICAL at 22:21

## 2021-11-05 RX ADMIN — SENNA PLUS 2 TABLET(S): 8.6 TABLET ORAL at 21:17

## 2021-11-05 RX ADMIN — Medication 4: at 23:35

## 2021-11-05 NOTE — PROGRESS NOTE ADULT - ASSESSMENT
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 active hemorrhage, possibly HTNive hemorrhage given location and presented with SBP>200s.  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, untreated  - neuro checks q1  - EVD@15, monitor output, drain care, ICP<22  TCDs starting tomorrow  - pain control  CTH in AM    CVS:  Afib RVR on admission  - SBP goal 100-140  - cardene PRN to goal  - TTE  - labetalol 100mg q8h    PULM: resp failure requiring mech vent  Intubated for airway protection  - ETT to vent  - vent bundle  - chest PT  - ABG daily, titrate vent settings  - PS as tolerated    RENAL:  Patrick improving,monitor  - Fluids: HTS2%@100,cont  - BMPq6h  - Na goal 145-150  - trend lytes  - daily IOs    GI:  - Diet: TF; NPO MN for possible extubation tomorrow if exam improves and pressure supports well during the day  - GI prophylaxis: PPI while intubated  - Bowel regimen    ENDO:   A1c 6.4%  - FS goal 120-180  - MISS  - follwo one more FS, if elevated insulin drip for tight contro, or will start standing NPH    HEME/ONC:  - SCDs  - Chemoppx: hold fresh post op, unsecured aneurysm    ID:  - monitor for fevers      Patient is at high risk of neurologic deterioration/death due to:  brain herniation, heme expansion, cerebral edema, ICP crisis      Time spent: 35 critical care minutes ASSESSMENT/PLAN:    63M hx of HTN, DM p/w posterior fossa heme c/b IVH status post EVD, intubated for airway protection, 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 active hemorrhage, possibly HTNive hemorrhage given location and presented with SBP>200s.  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, untreated  - neuro checks q1  - EVD@15, monitor output, drain care, ICP<22  HMV drain monitor output  TCDs starting tomorrow  - pain control  CTH in AM    CVS:  Afib RVR on admission  - SBP goal 100-140  - cardene PRN to goal  - TTE  - labetalol 100mg q8h    PULM: resp failure requiring mech vent  Intubated for airway protection  - ETT to vent  - vent bundle  - chest PT  - ABG daily, titrate vent settings  - PS as tolerated    RENAL:  Patrick improving,monitor  - Fluids: HTS2%@100,cont  - BMPq6h  - Na goal 145-150  - trend lytes  - daily IOs, DC puneet    GI:  - Diet: TF; NPO MN for possible extubation tomorrow if exam improves and pressure supports well during the day  - GI prophylaxis: PPI while intubated  - Bowel regimen    ENDO:   A1c 6.4%  - FS goal 120-180  - MISS  - follwo one more FS, if elevated insulin drip for tight contro, or will start standing NPH    HEME/ONC:  - SCDs  - Chemoppx: hold fresh post op, unsecured aneurysm    ID:  - monitor for fevers      Patient is at high risk of neurologic deterioration/death due to:  brain herniation, heme expansion, cerebral edema, ICP crisis      Time spent: 35 critical care minutes ASSESSMENT/PLAN:    63M hx of HTN, DM p/w posterior fossa heme c/b IVH status post EVD, intubated for airway protection, 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 active hemorrhage, possibly HTNive hemorrhage given location and presented with SBP>200s.  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, untreated  - neuro checks q1  - EVD@15, monitor output, drain care, ICP<22  HMV drain monitor output  TCDs starting tomorrow  - pain control  CTH in AM    CVS:  Afib RVR on admission  - SBP goal 100-140 for unsecured cerebral aneurysm  - cardene PRN to goal  - TTE  - labetalol 100mg q8h    PULM: resp failure requiring mech vent  Intubated for airway protection  - ETT to vent  - vent bundle  - chest PT  - ABG daily, titrate vent settings  - PS as tolerated    RENAL:  Patrick improving,monitor  - Fluids: HTS2%@100,cont  - BMPq6h  - Na goal 145-150  - trend lytes  - daily IOs, DC teixeira    GI:  - Diet: TF; NPO MN for possible extubation tomorrow if exam improves and pressure supports well during the day  - GI prophylaxis: PPI while intubated  - Bowel regimen    ENDO:   A1c 6.4%  - FS goal 120-180  - MISS  - follwo one more FS, if elevated insulin drip for tight contro, or will start standing NPH    HEME/ONC:  - SCDs  - Chemoppx: hold fresh post op, unsecured aneurysm    ID:  - monitor for fevers      Patient is at high risk of neurologic deterioration/death due to:  hydrocephalus, aneurysm re rupture, cerebral edema, ICP crisis, respiratory failure requiring intubation

## 2021-11-05 NOTE — PROGRESS NOTE ADULT - SUBJECTIVE AND OBJECTIVE BOX
Patient seen and examined at bedside.    --Anticoagulation--    T(C): 37.4 (11-04-21 @ 23:00), Max: 37.4 (11-04-21 @ 23:00)  HR: 73 (11-05-21 @ 00:30) (70 - 153)  BP: 120/63 (11-05-21 @ 00:30) (100/80 - 245/142)  RR: 20 (11-05-21 @ 00:30) (15 - 38)  SpO2: 100% (11-05-21 @ 00:30) (97% - 100%)  Wt(kg): --    Exam:  intubated, no corneals, pupils small NR, no cough, no movement x 4

## 2021-11-05 NOTE — OCCUPATIONAL THERAPY INITIAL EVALUATION ADULT - LIVES WITH, PROFILE
Pt lives in a pvt home, 1 steps to enter, 1 flight to bed and bath. (I) ADLs and functional transfers. (+) /children/other relative/spouse

## 2021-11-05 NOTE — OCCUPATIONAL THERAPY INITIAL EVALUATION ADULT - ADDITIONAL COMMENTS
CT head 11/4-Slight decrease in size of the lateral ventricle seen. Intraventricular hemorrhage is again seen. Acute subarachnoid hemorrhage as well as acute bilateral tentorial subdural hematomas are again seen.

## 2021-11-05 NOTE — PROGRESS NOTE ADULT - SUBJECTIVE AND OBJECTIVE BOX
NSCU Progress Note    Assessment/Hospital Course:    61M hx of HTN, DM, who was BIBEMS as a code stroke w/ LKW ~0830 am when he c/o HA with n/v, EMS activated found patient to be incoherent, hypertensive 220s and bradycardic 50s and ,eventually unresponsive in ED requiring intubation for GCS/Airway protection, CTH with large posterior fossa bleed w/ IVH, CTA w/ no LVO, large hyperdense focus in 4th vent suggesting active bleed vs. aneurysm, in ED with poor exam, EVD placed @15 adn admitted to NSICU for further management of AVM vs. HTSV ICH.  11/4: Admitted. intubated for GCS/Airway protection. EVD placed in ED s/p urgent SOC    24 Hour Events/Subjective:  - POD1 SOC  - plan for angio today  - rate controlled      REVIEW OF SYSTEMS:  - unable to obtain given mental status    VITALS:   - Reviewed    IMAGING/DATA:   - Reviewed     ALLERGIES:   - penicillin (Other)        PHYSICAL EXAM:    General: intubated  CVS: RRR  Pulm: CTAB  GI: Soft, NTND  Extremities: No LE Edema  Neuro: Eyes closed, no commands, pupils 2mm unreactive, absent corneals, +weak cough, overbreathes, localizes arms L>R, weakly w/d legs    NSCU Progress Note    Assessment/Hospital Course:    61M hx of HTN, DM, who was BIBEMS as a code stroke w/ LKW ~0830 am when he c/o HA with n/v, EMS activated found patient to be incoherent, hypertensive 220s and bradycardic 50s and ,eventually unresponsive in ED requiring intubation for GCS/Airway protection, CTH with large posterior fossa bleed w/ IVH, CTA w/ no LVO, large hyperdense focus in 4th vent suggesting active bleed vs. aneurysm, in ED with poor exam, EVD placed @15 adn admitted to NSICU for further management of AVM vs. HTSV ICH.  11/4: Admitted. intubated for GCS/Airway protection. EVD placed in ED s/p urgent SOC    24 Hour Events/Subjective:  - POD1 SOC  - s/p diagnostic angio L PICA aneurysm, untreated  - rate controlled  - exam improved this am      REVIEW OF SYSTEMS:  - unable to obtain given mental status    VITALS:   - Reviewed    IMAGING/DATA:   - Reviewed     ALLERGIES:   - penicillin (Other)        PHYSICAL EXAM:    General: intubated  CVS: RRR  Pulm: CTAB  GI: Soft, NTND  Extremities: No LE Edema  Neuro: MELVI to voice/noxious, FC (shows two fingers on R, squeeze hands, wiggles toes; squeezed hand when asked if he can see), RUE AG, LUE 2/4, RLE safeguard in place/wiggles toes/moves foot, LLE KE 4/5 with assistance, wiggles toes   NSCU Progress Note    Assessment/Hospital Course:    61M hx of HTN, DM, who was BIBEMS as a code stroke w/ LKW ~0830 am when he c/o HA with n/v, EMS activated found patient to be incoherent, hypertensive 220s and bradycardic 50s and ,eventually unresponsive in ED requiring intubation for GCS/Airway protection, CTH with large posterior fossa bleed w/ IVH, CTA w/ no LVO, large hyperdense focus in 4th vent suggesting active bleed vs. aneurysm, in ED with poor exam, EVD placed @15 adn admitted to NSICU for further management of AVM vs. HTSV ICH.  11/4: Admitted. intubated for GCS/Airway protection. EVD placed in ED s/p urgent SOC    24 Hour Events/Subjective:  - POD1 SOC  - s/p diagnostic angio L PICA aneurysm, untreated  - rate controlled  - exam improved this am      REVIEW OF SYSTEMS:  - unable to obtain given mental status    VITALS:   - Reviewed    IMAGING/DATA:   - Reviewed     ALLERGIES:   - penicillin (Other)        PHYSICAL EXAM:    General: intubated  CVS: RRR  Pulm: CTAB  GI: Soft, NTND  Extremities: No LE Edema  Neuro: MELVI to voice/noxious, FC (shows two fingers on R, squeeze hands, wiggles toes; squeezed hand when asked if he can see - required significant stimulus to do this), RUE AG, LUE 2/4, RLE safeguard in place/wiggles toes/moves foot, LLE KE 4/5 with assistance, wiggles toes

## 2021-11-05 NOTE — OCCUPATIONAL THERAPY INITIAL EVALUATION ADULT - ASR WT BEARING STATUS EVAL
Brain CT without contrast 11/4: Acute intraventricular hemorrhage in the bilateral lateral, third, and fourth ventricles. Ventricular dilatation suggesting hydrocephalus. Hypodensity in the periventricular white matter suggesting white matter microvascular ischemic disease versus transependymal flow of CSF. Subarachnoid hemorrhage in the posterior fossa. Effacement of basal cisterns. CT perfusion: CBF<30% volume: 5 ml, left corona radiata, suggesting core infarct. Tmax>6.0s volume: 15 ml, in the region of the brainstem and cerebellum, suggesting hemorrhage versus ischemia. Mismatch volume: n/a. Please note that core infarct volume does not overlap with ischemic penumbra volume. Mismatch ratio: n/a. CT angiography neck: No hemodynamically significant stenosis of the bilateral cervical ICAs using NASCET criteria.  Patent vertebral arteries.  No evidence of vascular dissection. CT angiography brain: 1.  A 4 mm rounded hyperdense focus in the intraventricular hemorrhage in the fourth ventricle suggesting active hemorrhage or aneurysm. 2.  No large vessel occlusion.

## 2021-11-05 NOTE — OCCUPATIONAL THERAPY INITIAL EVALUATION ADULT - COGNITIVE, VISUAL PERCEPTUAL, OT EVAL
Pt will follow 25% simple 1 step directions within 4 weeks. Pt will track therapist in dominant visual field with cues 2/4 trials within 4 weeks

## 2021-11-05 NOTE — OCCUPATIONAL THERAPY INITIAL EVALUATION ADULT - PRECAUTIONS/LIMITATIONS, REHAB EVAL
EMS activated found patient to be incoherent, hypertensive 220s and bradycardic 50s and ,eventually unresponsive in ED requiring intubation for GCS/Airway protection, CTH with large posterior fossa bleed w/ IVH, CTA w/ no LVO, large hyperdense focus in 4th vent suggesting active bleed vs. aneurysm, in ED with poor exam, EVD placed @15 adn admitted to NSICU for further management of AVM vs. HTSV ICH. 11/4: Admitted. intubated for GCS/Airway protection. EVD placed in ED s/p urgent SOC/fall precautions

## 2021-11-05 NOTE — OCCUPATIONAL THERAPY INITIAL EVALUATION ADULT - GENERAL OBSERVATIONS, REHAB EVAL
Patient received semi-supine in bed, +tele, BP cuff, pulse ox, +IVL, sequentials donned, trach to vent, PEG

## 2021-11-05 NOTE — CHART NOTE - NSCHARTNOTEFT_GEN_A_CORE
Interventional Neuro Radiology  Pre-Procedure Note    This is a 61M hx of HTN, DM, who was BIBEMS as a code stroke w/ LKW ~0830 am when he c/o HA with n/v, EMS activated found patient to be incoherent, hypertensive 220s and bradycardic 50s and ,eventually unresponsive in ED requiring intubation for GCS/Airway protection, CTH with large posterior fossa bleed w/ IVH, CTA w/ no LVO, large hyperdense focus in 4th vent suggesting active bleed vs. aneurysm, in ED with poor exam, EVD placed @15 adn admitted to NSICU for further management of AVM vs. HTSV ICH.  11/4: Admitted. intubated for GCS/Airway protection. EVD placed in ED s/p urgent SOC    24 Hour Events/Subjective:  - POD1 SOC  - plan for angio today  - rate controlled        Neuro Exam: Intubated; Eyes closed, no commands, pupils 2mm unreactive, absent corneals, +weak cough, overbreathes, localizes arms L>R, weakly w/d legs       PAST MEDICAL & SURGICAL HISTORY:  HTN (hypertension)    Diabetes mellitus        Social History:   Denies tobacco use    FAMILY HISTORY:    No pertinent family history    Allergies: penicillin (Other)      Current Medications: ceFAZolin   IVPB 2000 milliGRAM(s) IV Intermittent every 8 hours  chlorhexidine 4% Liquid 1 Application(s) Topical <User Schedule>  dextrose 50% Injectable 25 Gram(s) IV Push once  dextrose 50% Injectable 12.5 Gram(s) IV Push once  dextrose 50% Injectable 25 Gram(s) IV Push once  fentaNYL    Injectable 25 MICROGram(s) IV Push every 2 hours PRN  insulin lispro (ADMELOG) corrective regimen sliding scale   SubCutaneous every 6 hours  labetalol 100 milliGRAM(s) Oral every 8 hours  niCARdipine Infusion 5 mG/Hr IV Continuous <Continuous>  pantoprazole   Suspension 40 milliGRAM(s) Oral daily  polyethylene glycol 3350 17 Gram(s) Oral at bedtime  senna 2 Tablet(s) Oral at bedtime  sodium chloride 2% . 1000 milliLiter(s) IV Continuous <Continuous>      Labs:                         11.3   18.36 )-----------( 274      ( 05 Nov 2021 00:04 )             33.8       11-05    140  |  106  |  22  ----------------------------<  272<H>  4.4   |  20<L>  |  1.37<H>    Ca    8.4      05 Nov 2021 00:04  Phos  4.4     11-05  Mg     1.6     11-05    TPro  7.8  /  Alb  4.8  /  TBili  0.4  /  DBili  x   /  AST  18  /  ALT  14  /  AlkPhos  94  11-04        Blood Bank: 11-04-21  O  --  Positive      Assessment/Plan:   This is a 60y/o Male who presents with ______. Patient presents to neuro-IR for selective cerebral angiography. Procedure/ risks/ benefits/ goals/ alternatives were explained. Risks include but are not limited to stroke/ vessel injury/ hemorrhage/ groin hematoma. All questions answered. Informed content obtained from patient's wife. Consent placed in chart.     Nancy Robert PA-C Interventional Neuro Radiology  Pre-Procedure Note    This is a 61M hx of HTN, DM, who was BIBEMS as a code stroke w/ LKW ~0830 am when he c/o HA with n/v, EMS activated found patient to be incoherent, hypertensive 220s and bradycardic 50s and ,eventually unresponsive in ED requiring intubation for GCS/Airway protection, CTH with large posterior fossa bleed w/ IVH, CTA w/ no LVO, large hyperdense focus in 4th vent suggesting active bleed vs. aneurysm, in ED with poor exam, EVD placed @15 adn admitted to NSICU for further management of AVM vs. HTSV ICH.  11/4: Admitted. intubated for GCS/Airway protection. EVD placed in ED s/p urgent SOC    24 Hour Events/Subjective:  - POD1 SOC  - plan for angio today  - rate controlled        Neuro Exam: Intubated; Eyes closed, no commands, pupils 2mm unreactive, absent corneals, +weak cough, overbreathes, localizes arms L>R, weakly w/d legs       PAST MEDICAL & SURGICAL HISTORY:  HTN (hypertension)    Diabetes mellitus        Social History:   Denies tobacco use    FAMILY HISTORY:    No pertinent family history    Allergies: penicillin (Other)      Current Medications: ceFAZolin   IVPB 2000 milliGRAM(s) IV Intermittent every 8 hours  chlorhexidine 4% Liquid 1 Application(s) Topical <User Schedule>  dextrose 50% Injectable 25 Gram(s) IV Push once  dextrose 50% Injectable 12.5 Gram(s) IV Push once  dextrose 50% Injectable 25 Gram(s) IV Push once  fentaNYL    Injectable 25 MICROGram(s) IV Push every 2 hours PRN  insulin lispro (ADMELOG) corrective regimen sliding scale   SubCutaneous every 6 hours  labetalol 100 milliGRAM(s) Oral every 8 hours  niCARdipine Infusion 5 mG/Hr IV Continuous <Continuous>  pantoprazole   Suspension 40 milliGRAM(s) Oral daily  polyethylene glycol 3350 17 Gram(s) Oral at bedtime  senna 2 Tablet(s) Oral at bedtime  sodium chloride 2% . 1000 milliLiter(s) IV Continuous <Continuous>      Labs:                         11.3   18.36 )-----------( 274      ( 05 Nov 2021 00:04 )             33.8       11-05    140  |  106  |  22  ----------------------------<  272<H>  4.4   |  20<L>  |  1.37<H>    Ca    8.4      05 Nov 2021 00:04  Phos  4.4     11-05  Mg     1.6     11-05    TPro  7.8  /  Alb  4.8  /  TBili  0.4  /  DBili  x   /  AST  18  /  ALT  14  /  AlkPhos  94  11-04        Blood Bank: 11-04-21  O  --  Positive      Assessment/Plan:   This is a 60y/o Male who presents with cerebellar hemorrhage. Patient presents to neuro-IR for selective cerebral angiography. Procedure/ risks/ benefits/ goals/ alternatives were explained. Risks include but are not limited to stroke/ vessel injury/ hemorrhage/ groin hematoma. All questions answered. Informed content obtained from patient's wife. Consent placed in chart.     Nancy Robert PA-C

## 2021-11-05 NOTE — OCCUPATIONAL THERAPY INITIAL EVALUATION ADULT - ADL RETRAINING, OT EVAL
Pt will perform oral care mod a with AE as needed within 4 weeks. Pt will perform face washing with mod A within 4 weeks.

## 2021-11-05 NOTE — CHART NOTE - NSCHARTNOTEFT_GEN_A_CORE
Interventional Neuro- Radiology   Procedure Note      Procedure: Selective Cerebral Angiography   Pre- Procedure Diagnosis:  Post- Procedure Diagnosis:    : Dr. Camilo MD  Fellow: Dr. Zaynab MD  Physician Assistant: Nancy Robert PA-C  Resident: Dr. Warren MD    RN: Hortencia Gordon: Jax    Anesthesia: Dr. Monique MD  (general anesthesia)    I/Os:  Fluids:  Contrast:   Estimated Blood Loss: <10cc    Preliminary Report:  Under general anesthesia, using a ___Fr short/long sheath to the right/ left/ bilateral groin examination of left vertebral artery/ left internal carotid artery/ left external carotid artey/ right vertebral artery/ right internal carotid artery/ right external carotid artery via selective cerebral angiography demonstrates ________. ( Official note to follow).    Patient tolerated procedure well, vital signs stable, hemodynamically stable, no change in neurological status compared to baseline. Results discussed with neurosurgery/ patient and their family. Groin sheath d/c'ed, manual compression held to hemostasis, no active bleeding, no hematoma, vascade closure device applied, quick clot and safeguard balloon dressing applied at _____h. Patient transferred to PACU/ NSCU/ IR recovery for further care/ monitoring.       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 Camilo MD  Fellow: Dr. Zaynab MD  Physician Assistant: Nancy Robert PA-C  Resident: Dr. Warren MD    RN: Hortencia  Tech: Jax    Anesthesia: Dr. Monique MD  (general anesthesia)    I/Os:  Fluids: 250 cc  Barney: 100 cc  Contrast: 112 cc  Estimated Blood Loss: <10cc    Preliminary Report:  Under general anesthesia, using a 5Fr 65 arrowflex sheath to the right groin examination of left vertebral artery/ left internal carotid artery/ left external carotid artery/ right vertebral artery/ right internal carotid artery/ right external carotid artery via selective cerebral angiography demonstrates small aneurysm off L PICA. (Official note to follow).    Patient tolerated procedure well, vital signs stable, hemodynamically stable, no change in neurological status compared to baseline. Results discussed with neurosurgery/ patient and their family. Groin sheath d/c'ed, manual compression held to hemostasis, no active bleeding, no hematoma, vascade closure device applied, quick clot and safeguard balloon dressing applied at 8:50h. Patient transferred to NSCU for further care/ monitoring.       Nancy Robert PA-C

## 2021-11-05 NOTE — PROGRESS NOTE ADULT - ASSESSMENT
ANGLE ALICIA  61M pmhx HTN, DM at work complaining of HA ~8:30, starting feeling dizzy and vomiting, HTN/keke when EMS got to him. SBP 220s, HR 50s. CTH shows large posterior fossa bleed w/ IVH/SAH/hydro.   -Adm NSCU, q1h neuro check  -EVD in ED, drainage at 15  -s/p OR  -Preop angio embo in AM

## 2021-11-05 NOTE — PROGRESS NOTE ADULT - ASSESSMENT
ASSESSMENT/PLAN:    63M hx of HTN, DM p/w posterior fossa heme c/b IVH status post EVD, intubated for airway protection    NEURO:  CTA with hyperdense foci in 4th vent suggestive of aneurysm vs. active hemorrhage, possibly HTNive hemorrhage given location and presented with SBP>200s.  - neuro checks q1  - EVD@15, monitor output, drain care, ICP<22  - repeat CTH AM  - angio today  - pain control    CVS:  - SBP goal 100-140  - cardene PRN to goal  - TTE  - labetalol 100mg q8h    PULM: resp failure requiring mech vent  Intubated for airway protection  - ETT to vent  - vent bundle  - chest PT  - ABG daily, titrate vent settings; repeat ABG  - CXR    RENAL:  - Fluids: HTS2%@75  - BMPq6h  - Na goal 145-150  - trend lytes  - daily IOs    GI:  - Diet: NPO for angio  - GI prophylaxis: PPI while intubated  - Bowel regimen    ENDO:   - FS goal 120-180  - A1c    HEME/ONC:  - SCDs  - Chemoppx: hold fresh post op, angio    ID:  - monitor for fevers      Patient is at high risk of neurologic deterioration/death due to:  brain herniation, heme expansion, cerebral edema, ICP crisis      Time spent: 35 critical care minutes ASSESSMENT/PLAN:    63M hx of HTN, DM p/w posterior fossa heme c/b IVH status post EVD, intubated for airway protection, 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 active hemorrhage, possibly HTNive hemorrhage given location and presented with SBP>200s.  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, untreated  - neuro checks q1  - EVD@15, monitor output, drain care, ICP<22  - pain control    CVS:  Afib RVR on admission  - SBP goal 100-140  - cardene PRN to goal  - TTE  - labetalol 100mg q8h    PULM: resp failure requiring mech vent  Intubated for airway protection  - ETT to vent  - vent bundle  - chest PT  - ABG daily, titrate vent settings  - PS as tolerated    RENAL:  - Fluids: HTS2%@75  - BMPq6h  - Na goal 145-150  - trend lytes  - daily IOs    GI:  - Diet: TF; NPO MN for possible extubation tomorrow  - GI prophylaxis: PPI while intubated  - Bowel regimen    ENDO:   A1c 6.4%  - FS goal 120-180  - MISS  - follwo one more FS, if elevarted insulin drip for tight contro, or will start standing NPH    HEME/ONC:  - SCDs  - Chemoppx: hold fresh post op, unsecured aneurysm    ID:  - monitor for fevers      Patient is at high risk of neurologic deterioration/death due to:  brain herniation, heme expansion, cerebral edema, ICP crisis      Time spent: 35 critical care minutes ASSESSMENT/PLAN:    63M hx of HTN, DM p/w posterior fossa heme c/b IVH status post EVD, intubated for airway protection, 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 active hemorrhage, possibly HTNive hemorrhage given location and presented with SBP>200s.  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, untreated  - neuro checks q1  - EVD@15, monitor output, drain care, ICP<22  - pain control    CVS:  Afib RVR on admission  - SBP goal 100-140  - cardene PRN to goal  - TTE  - labetalol 100mg q8h    PULM: resp failure requiring mech vent  Intubated for airway protection  - ETT to vent  - vent bundle  - chest PT  - ABG daily, titrate vent settings  - PS as tolerated    RENAL:  - Fluids: HTS2%@75  - BMPq6h  - Na goal 145-150  - trend lytes  - daily IOs    GI:  - Diet: TF; NPO MN for possible extubation tomorrow if exam improves and pressure supports well during the day  - GI prophylaxis: PPI while intubated  - Bowel regimen    ENDO:   A1c 6.4%  - FS goal 120-180  - MISS  - follwo one more FS, if elevarted insulin drip for tight contro, or will start standing NPH    HEME/ONC:  - SCDs  - Chemoppx: hold fresh post op, unsecured aneurysm    ID:  - monitor for fevers      Patient is at high risk of neurologic deterioration/death due to:  brain herniation, heme expansion, cerebral edema, ICP crisis      Time spent: 35 critical care minutes

## 2021-11-05 NOTE — PROGRESS NOTE ADULT - SUBJECTIVE AND OBJECTIVE BOX
NSCU Progress Note    Assessment/Hospital Course:    61M hx of HTN, DM, who was BIBEMS as a code stroke w/ LKW ~0830 am when he c/o HA with n/v, EMS activated found patient to be incoherent, hypertensive 220s and bradycardic 50s and ,eventually unresponsive in ED requiring intubation for GCS/Airway protection, CTH with large posterior fossa bleed w/ IVH, CTA w/ no LVO, large hyperdense focus in 4th vent suggesting active bleed vs. aneurysm, in ED with poor exam, EVD placed @15 adn admitted to NSICU for further management of AVM vs. HTSV ICH.  11/4: Admitted. intubated for GCS/Airway protection. EVD placed in ED s/p urgent SOC    24 Hour Events/Subjective:  - POD1 SOC  - s/p diagnostic angio L PICA aneurysm, untreated  - rate controlled  - exam improved this am      REVIEW OF SYSTEMS:  - unable to obtain given mental status    VITALS:   - Reviewed    IMAGING/DATA:   - Reviewed     ALLERGIES:   - penicillin (Other)        PHYSICAL EXAM:    General: intubated  CVS: RRR  Pulm: CTAB  GI: Soft, NTND  Extremities: No LE Edema  Neuro: MELVI to voice/noxious, FC (shows two fingers on R, squeeze hands, wiggles toes), RUE AG, LUE 2/4, RLE safeguard in place/wiggles toes/moves foot, LLE KE 4/5 with assistance, wiggles toes   NSCU Progress Note    Assessment/Hospital Course:    61M hx of HTN, DM, who was BIBEMS as a code stroke w/ LKW ~0830 am when he c/o HA with n/v, EMS activated found patient to be incoherent, hypertensive 220s and bradycardic 50s and ,eventually unresponsive in ED requiring intubation for GCS/Airway protection, CTH with large posterior fossa bleed w/ IVH, CTA w/ no LVO, large hyperdense focus in 4th vent suggesting active bleed vs. aneurysm, in ED with poor exam, EVD placed @15 adn admitted to NSICU for further management of AVM vs. HTSV ICH.  11/4: Admitted. intubated for GCS/Airway protection. EVD placed in ED s/p urgent SOC    24 Hour Events/Subjective:  - POD1 SOC  - s/p diagnostic angio L PICA aneurysm, untreated  - rate controlled  - exam improved this am      REVIEW OF SYSTEMS:  - unable to obtain given mental status    VITALS:   - Reviewed    IMAGING/DATA:   - Reviewed     ALLERGIES:   - penicillin (Other)        PHYSICAL EXAM:    General: intubated  CVS: RRR  Pulm: CTAB  GI: Soft, NTND  Extremities: No LE Edema  Neuro: MELVI to voice/noxious, FC ( R squeeze hands, wiggles toes), RUE AG, LUE 2/5, RLE AG HF, 2/5 distally/wiggles toes/moves foot, LLE 2/5, wiggles toes

## 2021-11-05 NOTE — OCCUPATIONAL THERAPY INITIAL EVALUATION ADULT - PERTINENT HX OF CURRENT PROBLEM, REHAB EVAL
61M hx HTN, DM, cardiac stent on ASA. At work complaining of HA ~8:30, starting feeling dizzy and vomiting, HTN/keke when EMS got to him. SBP 220s, HR 50s.On EMS arrival at 09:39AM 11/4/21, patient seen talking a little, garbling, moving all extremities, then quickly became unresponsive. No known blood thinners. CTH shows large posterior fossa bleed w/ IVH/SAH/hydro.

## 2021-11-06 LAB
ANION GAP SERPL CALC-SCNC: 10 MMOL/L — SIGNIFICANT CHANGE UP (ref 5–17)
ANION GAP SERPL CALC-SCNC: 11 MMOL/L — SIGNIFICANT CHANGE UP (ref 5–17)
ANION GAP SERPL CALC-SCNC: 11 MMOL/L — SIGNIFICANT CHANGE UP (ref 5–17)
ANION GAP SERPL CALC-SCNC: 12 MMOL/L — SIGNIFICANT CHANGE UP (ref 5–17)
BUN SERPL-MCNC: 25 MG/DL — HIGH (ref 7–23)
BUN SERPL-MCNC: 26 MG/DL — HIGH (ref 7–23)
BUN SERPL-MCNC: 30 MG/DL — HIGH (ref 7–23)
BUN SERPL-MCNC: 30 MG/DL — HIGH (ref 7–23)
CALCIUM SERPL-MCNC: 8.4 MG/DL — SIGNIFICANT CHANGE UP (ref 8.4–10.5)
CALCIUM SERPL-MCNC: 8.7 MG/DL — SIGNIFICANT CHANGE UP (ref 8.4–10.5)
CALCIUM SERPL-MCNC: 8.8 MG/DL — SIGNIFICANT CHANGE UP (ref 8.4–10.5)
CALCIUM SERPL-MCNC: 9.1 MG/DL — SIGNIFICANT CHANGE UP (ref 8.4–10.5)
CHLORIDE SERPL-SCNC: 119 MMOL/L — HIGH (ref 96–108)
CHLORIDE SERPL-SCNC: 119 MMOL/L — HIGH (ref 96–108)
CHLORIDE SERPL-SCNC: 120 MMOL/L — HIGH (ref 96–108)
CHLORIDE SERPL-SCNC: 121 MMOL/L — HIGH (ref 96–108)
CO2 SERPL-SCNC: 19 MMOL/L — LOW (ref 22–31)
CO2 SERPL-SCNC: 19 MMOL/L — LOW (ref 22–31)
CO2 SERPL-SCNC: 20 MMOL/L — LOW (ref 22–31)
CO2 SERPL-SCNC: 20 MMOL/L — LOW (ref 22–31)
CREAT SERPL-MCNC: 1.17 MG/DL — SIGNIFICANT CHANGE UP (ref 0.5–1.3)
CREAT SERPL-MCNC: 1.2 MG/DL — SIGNIFICANT CHANGE UP (ref 0.5–1.3)
CREAT SERPL-MCNC: 1.28 MG/DL — SIGNIFICANT CHANGE UP (ref 0.5–1.3)
CREAT SERPL-MCNC: 1.33 MG/DL — HIGH (ref 0.5–1.3)
GLUCOSE SERPL-MCNC: 175 MG/DL — HIGH (ref 70–99)
GLUCOSE SERPL-MCNC: 190 MG/DL — HIGH (ref 70–99)
GLUCOSE SERPL-MCNC: 220 MG/DL — HIGH (ref 70–99)
GLUCOSE SERPL-MCNC: 234 MG/DL — HIGH (ref 70–99)
MAGNESIUM SERPL-MCNC: 2.4 MG/DL — SIGNIFICANT CHANGE UP (ref 1.6–2.6)
MAGNESIUM SERPL-MCNC: 2.4 MG/DL — SIGNIFICANT CHANGE UP (ref 1.6–2.6)
MAGNESIUM SERPL-MCNC: 2.7 MG/DL — HIGH (ref 1.6–2.6)
MAGNESIUM SERPL-MCNC: 2.7 MG/DL — HIGH (ref 1.6–2.6)
PHOSPHATE SERPL-MCNC: 2.3 MG/DL — LOW (ref 2.5–4.5)
PHOSPHATE SERPL-MCNC: 2.6 MG/DL — SIGNIFICANT CHANGE UP (ref 2.5–4.5)
PHOSPHATE SERPL-MCNC: 2.8 MG/DL — SIGNIFICANT CHANGE UP (ref 2.5–4.5)
PHOSPHATE SERPL-MCNC: 3.4 MG/DL — SIGNIFICANT CHANGE UP (ref 2.5–4.5)
POTASSIUM SERPL-MCNC: 3.9 MMOL/L — SIGNIFICANT CHANGE UP (ref 3.5–5.3)
POTASSIUM SERPL-MCNC: 3.9 MMOL/L — SIGNIFICANT CHANGE UP (ref 3.5–5.3)
POTASSIUM SERPL-MCNC: 4 MMOL/L — SIGNIFICANT CHANGE UP (ref 3.5–5.3)
POTASSIUM SERPL-MCNC: 4.2 MMOL/L — SIGNIFICANT CHANGE UP (ref 3.5–5.3)
POTASSIUM SERPL-SCNC: 3.9 MMOL/L — SIGNIFICANT CHANGE UP (ref 3.5–5.3)
POTASSIUM SERPL-SCNC: 3.9 MMOL/L — SIGNIFICANT CHANGE UP (ref 3.5–5.3)
POTASSIUM SERPL-SCNC: 4 MMOL/L — SIGNIFICANT CHANGE UP (ref 3.5–5.3)
POTASSIUM SERPL-SCNC: 4.2 MMOL/L — SIGNIFICANT CHANGE UP (ref 3.5–5.3)
SODIUM SERPL-SCNC: 149 MMOL/L — HIGH (ref 135–145)
SODIUM SERPL-SCNC: 150 MMOL/L — HIGH (ref 135–145)
SODIUM SERPL-SCNC: 151 MMOL/L — HIGH (ref 135–145)
SODIUM SERPL-SCNC: 151 MMOL/L — HIGH (ref 135–145)

## 2021-11-06 PROCEDURE — 71045 X-RAY EXAM CHEST 1 VIEW: CPT | Mod: 26

## 2021-11-06 PROCEDURE — 99291 CRITICAL CARE FIRST HOUR: CPT

## 2021-11-06 PROCEDURE — 70450 CT HEAD/BRAIN W/O DYE: CPT | Mod: 26

## 2021-11-06 RX ORDER — LABETALOL HCL 100 MG
10 TABLET ORAL ONCE
Refills: 0 | Status: COMPLETED | OUTPATIENT
Start: 2021-11-06 | End: 2021-11-06

## 2021-11-06 RX ORDER — HYDRALAZINE HCL 50 MG
10 TABLET ORAL ONCE
Refills: 0 | Status: COMPLETED | OUTPATIENT
Start: 2021-11-06 | End: 2021-11-06

## 2021-11-06 RX ORDER — SODIUM CHLORIDE 5 G/100ML
1000 INJECTION, SOLUTION INTRAVENOUS
Refills: 0 | Status: DISCONTINUED | OUTPATIENT
Start: 2021-11-06 | End: 2021-11-07

## 2021-11-06 RX ORDER — ATORVASTATIN CALCIUM 80 MG/1
40 TABLET, FILM COATED ORAL AT BEDTIME
Refills: 0 | Status: DISCONTINUED | OUTPATIENT
Start: 2021-11-06 | End: 2021-11-22

## 2021-11-06 RX ORDER — HYDRALAZINE HCL 50 MG
25 TABLET ORAL THREE TIMES A DAY
Refills: 0 | Status: DISCONTINUED | OUTPATIENT
Start: 2021-11-06 | End: 2021-11-07

## 2021-11-06 RX ORDER — NICARDIPINE HYDROCHLORIDE 30 MG/1
5 CAPSULE, EXTENDED RELEASE ORAL
Qty: 40 | Refills: 0 | Status: DISCONTINUED | OUTPATIENT
Start: 2021-11-06 | End: 2021-11-08

## 2021-11-06 RX ORDER — PANTOPRAZOLE SODIUM 20 MG/1
40 TABLET, DELAYED RELEASE ORAL DAILY
Refills: 0 | Status: DISCONTINUED | OUTPATIENT
Start: 2021-11-06 | End: 2021-11-20

## 2021-11-06 RX ORDER — HUMAN INSULIN 100 [IU]/ML
4 INJECTION, SUSPENSION SUBCUTANEOUS EVERY 6 HOURS
Refills: 0 | Status: DISCONTINUED | OUTPATIENT
Start: 2021-11-06 | End: 2021-11-07

## 2021-11-06 RX ADMIN — SODIUM CHLORIDE 50 MILLILITER(S): 5 INJECTION, SOLUTION INTRAVENOUS at 07:55

## 2021-11-06 RX ADMIN — FENTANYL CITRATE 25 MICROGRAM(S): 50 INJECTION INTRAVENOUS at 05:42

## 2021-11-06 RX ADMIN — Medication 10 MILLIGRAM(S): at 17:07

## 2021-11-06 RX ADMIN — ATORVASTATIN CALCIUM 40 MILLIGRAM(S): 80 TABLET, FILM COATED ORAL at 21:54

## 2021-11-06 RX ADMIN — FENTANYL CITRATE 25 MICROGRAM(S): 50 INJECTION INTRAVENOUS at 16:53

## 2021-11-06 RX ADMIN — POLYETHYLENE GLYCOL 3350 17 GRAM(S): 17 POWDER, FOR SOLUTION ORAL at 21:25

## 2021-11-06 RX ADMIN — Medication 4: at 11:15

## 2021-11-06 RX ADMIN — FENTANYL CITRATE 25 MICROGRAM(S): 50 INJECTION INTRAVENOUS at 05:27

## 2021-11-06 RX ADMIN — Medication 10 MILLIGRAM(S): at 00:21

## 2021-11-06 RX ADMIN — FENTANYL CITRATE 25 MICROGRAM(S): 50 INJECTION INTRAVENOUS at 00:39

## 2021-11-06 RX ADMIN — Medication 4: at 17:10

## 2021-11-06 RX ADMIN — Medication 25 MILLIGRAM(S): at 21:25

## 2021-11-06 RX ADMIN — FENTANYL CITRATE 25 MICROGRAM(S): 50 INJECTION INTRAVENOUS at 17:08

## 2021-11-06 RX ADMIN — CHLORHEXIDINE GLUCONATE 1 APPLICATION(S): 213 SOLUTION TOPICAL at 21:25

## 2021-11-06 RX ADMIN — SODIUM CHLORIDE 50 MILLILITER(S): 5 INJECTION, SOLUTION INTRAVENOUS at 21:26

## 2021-11-06 RX ADMIN — Medication 100 MILLIGRAM(S): at 21:25

## 2021-11-06 RX ADMIN — Medication 10 MILLIGRAM(S): at 17:30

## 2021-11-06 RX ADMIN — SODIUM CHLORIDE 50 MILLILITER(S): 5 INJECTION, SOLUTION INTRAVENOUS at 06:26

## 2021-11-06 RX ADMIN — Medication 100 MILLIGRAM(S): at 05:20

## 2021-11-06 RX ADMIN — Medication 25 MILLIGRAM(S): at 13:42

## 2021-11-06 RX ADMIN — NICARDIPINE HYDROCHLORIDE 25 MG/HR: 30 CAPSULE, EXTENDED RELEASE ORAL at 21:26

## 2021-11-06 RX ADMIN — SENNA PLUS 2 TABLET(S): 8.6 TABLET ORAL at 21:25

## 2021-11-06 RX ADMIN — HUMAN INSULIN 4 UNIT(S): 100 INJECTION, SUSPENSION SUBCUTANEOUS at 17:11

## 2021-11-06 RX ADMIN — Medication 25 MILLIGRAM(S): at 05:20

## 2021-11-06 RX ADMIN — Medication 100 MILLIGRAM(S): at 13:03

## 2021-11-06 RX ADMIN — FENTANYL CITRATE 25 MICROGRAM(S): 50 INJECTION INTRAVENOUS at 00:54

## 2021-11-06 RX ADMIN — PANTOPRAZOLE SODIUM 40 MILLIGRAM(S): 20 TABLET, DELAYED RELEASE ORAL at 11:16

## 2021-11-06 RX ADMIN — Medication 8: at 06:13

## 2021-11-06 NOTE — PROGRESS NOTE ADULT - ASSESSMENT
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 active hemorrhage, possibly HTNive hemorrhage given location and presented with SBP>200s.  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, untreated  - neuro checks q1  - EVD@15, monitor output, drain care, ICP<22  - pain control    CVS:  Afib RVR on admission  - SBP goal 100-140  - cardene PRN to goal  - TTE  - labetalol 100mg q8h    PULM: resp failure requiring mech vent  Intubated for airway protection  - ETT to vent  - vent bundle  - chest PT  - ABG daily, titrate vent settings  - PS as tolerated    RENAL:  - Fluids: HTS2%@50  - BMPq6h  - Na goal 145-150  - trend lytes  - daily IOs    GI:  - Diet: TF  - GI prophylaxis: PPI while intubated  - Bowel regimen    ENDO:   A1c 6.4%  - FS goal 120-180  - MISS  - NPH 4q6    HEME/ONC:  - SCDs  - Chemoppx: hold fresh post op, unsecured aneurysm    ID:  - monitor for fevers      Patient is at high risk of neurologic deterioration/death due to:  brain herniation, heme expansion, cerebral edema, ICP crisis      Time spent: 35 critical care minutes ASSESSMENT/PLAN:    63M hx of HTN, DM p/w posterior fossa heme c/b IVH status post EVD, intubated for airway protection, 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 active hemorrhage, possibly HTNive hemorrhage given location and presented with SBP>200s.  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, untreated  - neuro checks q1  - EVD@15, monitor output, drain care, ICP<22  - pain control  - lipitor    CVS:  Afib RVR on admission  - SBP goal 100-140  - cardene PRN to goal  - TTE  - labetalol 100mg q8h    PULM: resp failure requiring mech vent  Intubated for airway protection  - ETT to vent  - vent bundle  - chest PT  - ABG daily, titrate vent settings  - PS as tolerated    RENAL:  - Fluids: HTS2%@50  - BMPq6h  - Na goal 145-150  - trend lytes  - daily IOs    GI:  - Diet: TF  - GI prophylaxis: PPI while intubated  - Bowel regimen    ENDO:   A1c 6.4%  - FS goal 120-180  - MISS  - NPH 4q6    HEME/ONC:  - SCDs  - Chemoppx: hold fresh post op, unsecured aneurysm    ID:  - monitor for fevers      Patient is at high risk of neurologic deterioration/death due to:  brain herniation, heme expansion, cerebral edema, ICP crisis      Time spent: 35 critical care minutes

## 2021-11-06 NOTE — PROGRESS NOTE ADULT - SUBJECTIVE AND OBJECTIVE BOX
Patient seen and examined at bedside.    --Anticoagulation--    T(C): 36.3 (11-05-21 @ 23:00), Max: 36.8 (11-05-21 @ 07:00)  HR: 82 (11-06-21 @ 03:25) (57 - 82)  BP: --  RR: 13 (11-06-21 @ 02:00) (10 - 22)  SpO2: 100% (11-06-21 @ 03:25) (94% - 100%)  Wt(kg): --    Exam:    intubated, no corneals, pupils small NR, no cough, no movement x 4

## 2021-11-06 NOTE — PROGRESS NOTE ADULT - ASSESSMENT
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 active hemorrhage, possibly HTNive hemorrhage given location and presented with SBP>200s.  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, untreated  - neuro checks q1  CTH w new L cerebellar hypodensity  Plan for angio next week  - EVD@15, monitor output, drain care, ICP<22  - pain control  - lipitor    CVS:  Afib RVR on admission  - SBP goal 100-140  - cardene PRN to goal  - TTE  - labetalol 100mg q8h    PULM: resp failure requiring mech vent  Intubated for airway protection  - ETT to vent  - vent bundle  - chest PT  - ABG daily, titrate vent settings  - PS as tolerated    RENAL:  - Fluids: HTS2%@50  - BMPq6h  - Na goal 145-150  - trend lytes  - daily IOs    GI:  - Diet: TF  - GI prophylaxis: PPI while intubated  - Bowel regimen    ENDO:   A1c 6.4%  - FS goal 120-180  - MISS  - NPH 4q6    HEME/ONC:  - SCDs  - Chemoppx: , unsecured aneurysm    ID:  - monitor for fevers      Patient is at high risk of neurologic deterioration/death due to:  brain herniation, heme expansion, cerebral edema, ICP crisis      Time spent: 35 critical care minutes ASSESSMENT/PLAN:    63M hx of HTN, DM p/w posterior fossa heme c/b IVH status post EVD, intubated for airway protection, 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/5) w/ L avf inaccessible from arterial circulation and fluoro related aneurysm, untreated    NEURO:  CTA with hyperdense foci in 4th vent suggestive of aneurysm vs. active hemorrhage, possibly HTNive hemorrhage given location and presented with SBP>200s.  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/5), untreated  - neuro checks q1  CTH w new L cerebellar hypodensity  Plan for angio next week  - EVD@15, monitor output, drain care, ICP<22  - pain control  - lipitor    CVS:  Afib RVR on admission  - SBP goal 100-140  - cardene PRN to goal  - TTE  - labetalol 100mg q8h    PULM: resp failure requiring mech vent  Intubated for airway protection  - ETT to vent  - vent bundle  - chest PT  - ABG daily, titrate vent settings  - PS as tolerated    RENAL:  - Fluids: HTS2%@50  - BMPq6h Cr up trending will send ur lytes, creat  - Na goal 145-150  - trend lytes  - daily IOs    GI:  - Diet: TF  - GI prophylaxis: PPI while intubated  - Bowel regimen    ENDO:   A1c 6.4%  - FS goal 120-180  - MISS  - NPH 4q6    HEME/ONC:  - SCDs  - Chemoppx: , unsecured aneurysm    ID:  - monitor for fevers      Patient is at high risk of neurologic deterioration/death due to:  brain herniation, heme expansion, cerebral edema, ICP crisis      Time spent: 35 critical care minutes ASSESSMENT/PLAN:    63M hx of HTN, DM p/w posterior fossa heme c/b IVH status post EVD, intubated for airway protection, 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/5) w/ L avf inaccessible from arterial circulation and fluoro related aneurysm, untreated    NEURO:  CTA with hyperdense foci in 4th vent suggestive of aneurysm vs. active hemorrhage, possibly HTNive hemorrhage given location and presented with SBP>200s.  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/5), untreated  - neuro checks q1  CTH w new L cerebellar hypodensity  Plan for angio next week  - EVD@15, monitor output--minimal, but patent, drain care, ICP<22  - pain control  - consider MRI     CVS:  Afib RVR, HTN  - SBP goal 100-140  - cardene PRN to goal  - TTE  - labetalol 100mg q8h    PULM: resp failure requiring mech vent  Intubated for airway protection  - ETT to vent  - vent bundle  - chest PT  - ABG daily, titrate vent settings  - PS as tolerated    RENAL:  - Fluids: HTS2%@50  - BMPq6h Cr up trending will send ur lytes, creat  - Na goal 145-150  - trend lytes  - daily IOs    GI:  - Diet: TF  - GI prophylaxis: PPI while intubated  - Bowel regimen    ENDO: DM, hyperglycemia  A1c 6.4%  - FS goal 120-180  - MISS  - NPH 4q6-->increased    HEME/ONC:  - SCDs  - Chemoppx: , unsecured aneurysm    ID:  - monitor for fevers    Patient is at high risk of neurologic deterioration/death due to:  brain herniation, heme expansion, cerebral edema, ICP crisis

## 2021-11-06 NOTE — PROGRESS NOTE ADULT - SUBJECTIVE AND OBJECTIVE BOX
NSCU Progress Note    Assessment/Hospital Course:    61M hx of HTN, DM, who was BIBEMS as a code stroke w/ LKW ~0830 am when he c/o HA with n/v, EMS activated found patient to be incoherent, hypertensive 220s and bradycardic 50s and ,eventually unresponsive in ED requiring intubation for GCS/Airway protection, CTH with large posterior fossa bleed w/ IVH, CTA w/ no LVO, large hyperdense focus in 4th vent suggesting active bleed vs. aneurysm, in ED with poor exam, EVD placed @15 adn admitted to NSICU for further management of AVM vs. HTSV ICH.  11/4: Admitted. intubated for GCS/Airway protection. EVD placed in ED POD0 SOC  11/5: s/p diagnostic angio L PICA aneurysm, untreated    24 Hour Events/Subjective:  - POD2 SOC   - HTS 2%        REVIEW OF SYSTEMS:  - unable to obtain given mental status    VITALS:   - Reviewed    IMAGING/DATA:   - Reviewed     ALLERGIES:   - penicillin (Other)        PHYSICAL EXAM:    General: intubated  CVS: RRR  Pulm: CTAB  GI: Soft, NTND  Extremities: No LE Edema  Neuro: EO to nox, 2mm NR, no cough/gag, +overbreathes, FC (sticks out tongue), BUE purp/spont WD to stimuli R>L, BLE WD

## 2021-11-06 NOTE — PROGRESS NOTE ADULT - ASSESSMENT
ANGLE ALICIA  61M pmhx HTN, DM at work complaining of HA ~8:30, starting feeling dizzy and vomiting, HTN/keke when EMS got to him. SBP 220s, HR 50s. CTH shows large posterior fossa bleed w/ IVH/SAH/hydro.   -Adm NSCU, q1h neuro check  -EVD in ED, drainage at 15  -s/p OR  -s/p Angio, no embo

## 2021-11-06 NOTE — PROGRESS NOTE ADULT - SUBJECTIVE AND OBJECTIVE BOX
NSCU Progress Note    Assessment/Hospital Course:    61M hx of HTN, DM, who was BIBEMS as a code stroke w/ LKW ~0830 am when he c/o HA with n/v, EMS activated found patient to be incoherent, hypertensive 220s and bradycardic 50s and ,eventually unresponsive in ED requiring intubation for GCS/Airway protection, CTH with large posterior fossa bleed w/ IVH, CTA w/ no LVO, large hyperdense focus in 4th vent suggesting active bleed vs. aneurysm, in ED with poor exam, EVD placed @15 adn admitted to NSICU for further management of AVM vs. HTSV ICH.  11/4: Admitted. intubated for GCS/Airway protection. EVD placed in ED POD0 SOC  11/5: s/p diagnostic angio L PICA aneurysm, untreated    24 Hour Events/Subjective:  - POD2 SOC   - HTS 2%  Following commands  TCD pending        REVIEW OF SYSTEMS:  - unable to obtain given mental status    VITALS:   - Reviewed    IMAGING/DATA:   - Reviewed     ALLERGIES:   - penicillin (Other)        PHYSICAL EXAM:    General: intubated  CVS: RRR  Pulm: CTAB  GI: Soft, NTND  Extremities: No LE Edema  Neuro: EO to nox, 2mm NR, no cough/gag, +overbreathes, FC (sticks out tongue), BUE purp/spont WD to stimuli R>L, BLE WD   NSCU Progress Note    Assessment/Hospital Course:    61M hx of HTN, DM, who was BIBEMS as a code stroke w/ LKW ~0830 am when he c/o HA with n/v, EMS activated found patient to be incoherent, hypertensive 220s and bradycardic 50s and ,eventually unresponsive in ED requiring intubation for GCS/Airway protection, CTH with large posterior fossa bleed w/ IVH, CTA w/ no LVO, large hyperdense focus in 4th vent suggesting active bleed vs. aneurysm, in ED with poor exam, EVD placed @15 adn admitted to NSICU for further management of AVM vs. HTSV ICH.  11/4: Admitted. intubated for GCS/Airway protection. EVD placed in ED POD0 SOC  11/5: s/p diagnostic angio L PICA aneurysm, untreated    24 Hour Events/Subjective:  - POD2 SOC   - HTS 2%  Following commands  TCD pending        REVIEW OF SYSTEMS:  - unable to obtain given mental status    VITALS:   - Reviewed    IMAGING/DATA:   - Reviewed     ALLERGIES:   - penicillin (Other)        PHYSICAL EXAM:    General: intubated  CVS: RRR  Pulm: CTAB  GI: Soft, NTND  Extremities: No LE Edema  Neuro: No EO to nox, 2mm very sluggish R, + cough/gag, +overbreathes, BUE minimal movement to noxious, BLE WD to noxious   NSCU Progress Note    Assessment/Hospital Course:    61M hx of HTN, DM, who was BIBEMS as a code stroke w/ LKW ~0830 am when he c/o HA with n/v, EMS activated found patient to be incoherent, hypertensive 220s and bradycardic 50s and ,eventually unresponsive in ED requiring intubation for GCS/Airway protection, CTH with large posterior fossa bleed w/ IVH, CTA w/ no LVO, large hyperdense focus in 4th vent suggesting active bleed vs. aneurysm, in ED with poor exam, EVD placed @15 adn admitted to NSICU for further management of AVM vs. HTSV ICH.  11/4: Admitted. intubated for GCS/Airway protection. EVD placed in ED POD0 SOC  11/5: s/p diagnostic angio L PICA aneurysm, untreated    24 Hour Events/Subjective:  - POD2 SOC   - HTS 2%  Following commands  TCD pending    REVIEW OF SYSTEMS:  - unable to obtain given mental status    VITALS:   - Reviewed    IMAGING/DATA:   - Reviewed     ALLERGIES:   - penicillin (Other)        PHYSICAL EXAM:    General: intubated  CVS: RRR  Pulm: CTAB  GI: Soft, NTND  Extremities: No LE Edema  Neuro: No EO to nox, 2mm very sluggish R, + cough/gag, +overbreathes, no FC, BUE minimal movement to noxious, BLE WD to noxious

## 2021-11-06 NOTE — PROGRESS NOTE ADULT - ATTENDING COMMENTS
Agree with/edited as appropriate above  remains intubated with somewhat worsening exam  repeat CTH, monitor EVD--appears patent but with minimal output

## 2021-11-07 LAB
ANION GAP SERPL CALC-SCNC: 10 MMOL/L — SIGNIFICANT CHANGE UP (ref 5–17)
ANION GAP SERPL CALC-SCNC: 11 MMOL/L — SIGNIFICANT CHANGE UP (ref 5–17)
APTT BLD: 29.1 SEC — SIGNIFICANT CHANGE UP (ref 27.5–35.5)
BUN SERPL-MCNC: 31 MG/DL — HIGH (ref 7–23)
BUN SERPL-MCNC: 33 MG/DL — HIGH (ref 7–23)
CALCIUM SERPL-MCNC: 9 MG/DL — SIGNIFICANT CHANGE UP (ref 8.4–10.5)
CALCIUM SERPL-MCNC: 9.2 MG/DL — SIGNIFICANT CHANGE UP (ref 8.4–10.5)
CHLORIDE SERPL-SCNC: 120 MMOL/L — HIGH (ref 96–108)
CHLORIDE SERPL-SCNC: 124 MMOL/L — HIGH (ref 96–108)
CHLORIDE SERPL-SCNC: 124 MMOL/L — HIGH (ref 96–108)
CHLORIDE SERPL-SCNC: 127 MMOL/L — HIGH (ref 96–108)
CO2 SERPL-SCNC: 20 MMOL/L — LOW (ref 22–31)
CO2 SERPL-SCNC: 20 MMOL/L — LOW (ref 22–31)
CO2 SERPL-SCNC: 21 MMOL/L — LOW (ref 22–31)
CO2 SERPL-SCNC: 23 MMOL/L — SIGNIFICANT CHANGE UP (ref 22–31)
CREAT SERPL-MCNC: 1.34 MG/DL — HIGH (ref 0.5–1.3)
CREAT SERPL-MCNC: 1.41 MG/DL — HIGH (ref 0.5–1.3)
CREAT SERPL-MCNC: 1.42 MG/DL — HIGH (ref 0.5–1.3)
CREAT SERPL-MCNC: 1.45 MG/DL — HIGH (ref 0.5–1.3)
GAS PNL BLDA: SIGNIFICANT CHANGE UP
GLUCOSE SERPL-MCNC: 161 MG/DL — HIGH (ref 70–99)
GLUCOSE SERPL-MCNC: 164 MG/DL — HIGH (ref 70–99)
GLUCOSE SERPL-MCNC: 166 MG/DL — HIGH (ref 70–99)
GLUCOSE SERPL-MCNC: 264 MG/DL — HIGH (ref 70–99)
HCT VFR BLD CALC: 26.8 % — LOW (ref 39–50)
HCT VFR BLD CALC: 27.8 % — LOW (ref 39–50)
HGB BLD-MCNC: 8.6 G/DL — LOW (ref 13–17)
HGB BLD-MCNC: 8.7 G/DL — LOW (ref 13–17)
INR BLD: 0.97 RATIO — SIGNIFICANT CHANGE UP (ref 0.88–1.16)
MAGNESIUM SERPL-MCNC: 2.5 MG/DL — SIGNIFICANT CHANGE UP (ref 1.6–2.6)
MAGNESIUM SERPL-MCNC: 2.6 MG/DL — SIGNIFICANT CHANGE UP (ref 1.6–2.6)
MAGNESIUM SERPL-MCNC: 2.6 MG/DL — SIGNIFICANT CHANGE UP (ref 1.6–2.6)
MAGNESIUM SERPL-MCNC: 2.7 MG/DL — HIGH (ref 1.6–2.6)
MCHC RBC-ENTMCNC: 30 PG — SIGNIFICANT CHANGE UP (ref 27–34)
MCHC RBC-ENTMCNC: 30.7 PG — SIGNIFICANT CHANGE UP (ref 27–34)
MCHC RBC-ENTMCNC: 31.3 GM/DL — LOW (ref 32–36)
MCHC RBC-ENTMCNC: 32.1 GM/DL — SIGNIFICANT CHANGE UP (ref 32–36)
MCV RBC AUTO: 95.7 FL — SIGNIFICANT CHANGE UP (ref 80–100)
MCV RBC AUTO: 95.9 FL — SIGNIFICANT CHANGE UP (ref 80–100)
NRBC # BLD: 0 /100 WBCS — SIGNIFICANT CHANGE UP (ref 0–0)
NRBC # BLD: 0 /100 WBCS — SIGNIFICANT CHANGE UP (ref 0–0)
PHOSPHATE SERPL-MCNC: 2.5 MG/DL — SIGNIFICANT CHANGE UP (ref 2.5–4.5)
PHOSPHATE SERPL-MCNC: 2.6 MG/DL — SIGNIFICANT CHANGE UP (ref 2.5–4.5)
PHOSPHATE SERPL-MCNC: 2.8 MG/DL — SIGNIFICANT CHANGE UP (ref 2.5–4.5)
PHOSPHATE SERPL-MCNC: 2.8 MG/DL — SIGNIFICANT CHANGE UP (ref 2.5–4.5)
PLATELET # BLD AUTO: 209 K/UL — SIGNIFICANT CHANGE UP (ref 150–400)
PLATELET # BLD AUTO: 231 K/UL — SIGNIFICANT CHANGE UP (ref 150–400)
POTASSIUM SERPL-MCNC: 3.7 MMOL/L — SIGNIFICANT CHANGE UP (ref 3.5–5.3)
POTASSIUM SERPL-MCNC: 3.9 MMOL/L — SIGNIFICANT CHANGE UP (ref 3.5–5.3)
POTASSIUM SERPL-MCNC: 4 MMOL/L — SIGNIFICANT CHANGE UP (ref 3.5–5.3)
POTASSIUM SERPL-MCNC: 4.1 MMOL/L — SIGNIFICANT CHANGE UP (ref 3.5–5.3)
POTASSIUM SERPL-SCNC: 3.7 MMOL/L — SIGNIFICANT CHANGE UP (ref 3.5–5.3)
POTASSIUM SERPL-SCNC: 3.9 MMOL/L — SIGNIFICANT CHANGE UP (ref 3.5–5.3)
POTASSIUM SERPL-SCNC: 4 MMOL/L — SIGNIFICANT CHANGE UP (ref 3.5–5.3)
POTASSIUM SERPL-SCNC: 4.1 MMOL/L — SIGNIFICANT CHANGE UP (ref 3.5–5.3)
PROTHROM AB SERPL-ACNC: 11.6 SEC — SIGNIFICANT CHANGE UP (ref 10.6–13.6)
RBC # BLD: 2.8 M/UL — LOW (ref 4.2–5.8)
RBC # BLD: 2.9 M/UL — LOW (ref 4.2–5.8)
RBC # FLD: 15.3 % — HIGH (ref 10.3–14.5)
RBC # FLD: 15.5 % — HIGH (ref 10.3–14.5)
SODIUM SERPL-SCNC: 151 MMOL/L — HIGH (ref 135–145)
SODIUM SERPL-SCNC: 154 MMOL/L — HIGH (ref 135–145)
SODIUM SERPL-SCNC: 157 MMOL/L — HIGH (ref 135–145)
SODIUM SERPL-SCNC: 158 MMOL/L — HIGH (ref 135–145)
SODIUM UR-SCNC: 51 MMOL/L — SIGNIFICANT CHANGE UP
WBC # BLD: 14.18 K/UL — HIGH (ref 3.8–10.5)
WBC # BLD: 18.14 K/UL — HIGH (ref 3.8–10.5)
WBC # FLD AUTO: 14.18 K/UL — HIGH (ref 3.8–10.5)
WBC # FLD AUTO: 18.14 K/UL — HIGH (ref 3.8–10.5)

## 2021-11-07 PROCEDURE — 36620 INSERTION CATHETER ARTERY: CPT

## 2021-11-07 PROCEDURE — 99291 CRITICAL CARE FIRST HOUR: CPT

## 2021-11-07 PROCEDURE — 70450 CT HEAD/BRAIN W/O DYE: CPT | Mod: 26

## 2021-11-07 PROCEDURE — 71045 X-RAY EXAM CHEST 1 VIEW: CPT | Mod: 26

## 2021-11-07 PROCEDURE — 99291 CRITICAL CARE FIRST HOUR: CPT | Mod: 25

## 2021-11-07 RX ORDER — HYDRALAZINE HCL 50 MG
50 TABLET ORAL THREE TIMES A DAY
Refills: 0 | Status: DISCONTINUED | OUTPATIENT
Start: 2021-11-07 | End: 2021-11-09

## 2021-11-07 RX ORDER — CHLORHEXIDINE GLUCONATE 213 G/1000ML
15 SOLUTION TOPICAL
Refills: 0 | Status: DISCONTINUED | OUTPATIENT
Start: 2021-11-07 | End: 2021-11-23

## 2021-11-07 RX ORDER — HUMAN INSULIN 100 [IU]/ML
8 INJECTION, SUSPENSION SUBCUTANEOUS EVERY 6 HOURS
Refills: 0 | Status: DISCONTINUED | OUTPATIENT
Start: 2021-11-07 | End: 2021-11-11

## 2021-11-07 RX ORDER — MULTIVIT WITH MIN/MFOLATE/K2 340-15/3 G
1 POWDER (GRAM) ORAL ONCE
Refills: 0 | Status: COMPLETED | OUTPATIENT
Start: 2021-11-07 | End: 2021-11-07

## 2021-11-07 RX ORDER — HYDRALAZINE HCL 50 MG
25 TABLET ORAL ONCE
Refills: 0 | Status: COMPLETED | OUTPATIENT
Start: 2021-11-07 | End: 2021-11-08

## 2021-11-07 RX ORDER — LABETALOL HCL 100 MG
10 TABLET ORAL ONCE
Refills: 0 | Status: COMPLETED | OUTPATIENT
Start: 2021-11-07 | End: 2021-11-07

## 2021-11-07 RX ADMIN — Medication 100 MILLIGRAM(S): at 13:56

## 2021-11-07 RX ADMIN — HUMAN INSULIN 8 UNIT(S): 100 INJECTION, SUSPENSION SUBCUTANEOUS at 11:04

## 2021-11-07 RX ADMIN — HUMAN INSULIN 8 UNIT(S): 100 INJECTION, SUSPENSION SUBCUTANEOUS at 05:33

## 2021-11-07 RX ADMIN — HUMAN INSULIN 8 UNIT(S): 100 INJECTION, SUSPENSION SUBCUTANEOUS at 00:26

## 2021-11-07 RX ADMIN — Medication 25 MILLIGRAM(S): at 05:18

## 2021-11-07 RX ADMIN — ATORVASTATIN CALCIUM 40 MILLIGRAM(S): 80 TABLET, FILM COATED ORAL at 21:30

## 2021-11-07 RX ADMIN — Medication 1 BOTTLE: at 12:52

## 2021-11-07 RX ADMIN — Medication 4: at 11:02

## 2021-11-07 RX ADMIN — Medication 100 MILLIGRAM(S): at 05:17

## 2021-11-07 RX ADMIN — CHLORHEXIDINE GLUCONATE 1 APPLICATION(S): 213 SOLUTION TOPICAL at 21:18

## 2021-11-07 RX ADMIN — Medication 4: at 17:04

## 2021-11-07 RX ADMIN — HUMAN INSULIN 8 UNIT(S): 100 INJECTION, SUSPENSION SUBCUTANEOUS at 17:04

## 2021-11-07 RX ADMIN — Medication 25 MILLIGRAM(S): at 21:30

## 2021-11-07 RX ADMIN — CHLORHEXIDINE GLUCONATE 15 MILLILITER(S): 213 SOLUTION TOPICAL at 05:55

## 2021-11-07 RX ADMIN — CHLORHEXIDINE GLUCONATE 15 MILLILITER(S): 213 SOLUTION TOPICAL at 17:02

## 2021-11-07 RX ADMIN — SODIUM CHLORIDE 50 MILLILITER(S): 5 INJECTION, SOLUTION INTRAVENOUS at 10:23

## 2021-11-07 RX ADMIN — Medication 100 MILLIGRAM(S): at 21:30

## 2021-11-07 RX ADMIN — Medication 25 MILLIGRAM(S): at 14:48

## 2021-11-07 RX ADMIN — Medication 10 MILLIGRAM(S): at 20:45

## 2021-11-07 RX ADMIN — Medication 12: at 00:26

## 2021-11-07 RX ADMIN — PANTOPRAZOLE SODIUM 40 MILLIGRAM(S): 20 TABLET, DELAYED RELEASE ORAL at 11:03

## 2021-11-07 NOTE — PROGRESS NOTE ADULT - SUBJECTIVE AND OBJECTIVE BOX
NSCU Progress Note    Assessment/Hospital Course:    61M hx of HTN, DM, who was BIBEMS as a code stroke w/ LKW ~0830 am when he c/o HA with n/v, EMS activated found patient to be incoherent, hypertensive 220s and bradycardic 50s and ,eventually unresponsive in ED requiring intubation for GCS/Airway protection, CTH with large posterior fossa bleed w/ IVH, CTA w/ no LVO, large hyperdense focus in 4th vent suggesting active bleed vs. aneurysm, in ED with poor exam, EVD placed @15 adn admitted to NSICU for further management of AVM vs. HTSV ICH.  11/4: Admitted. intubated for GCS/Airway protection. EVD placed in ED POD0 SOC  11/5: s/p diagnostic angio L PICA aneurysm, untreated    24 Hour Events/Subjective:  - HTS 2%  exam waxing and waning, on cardene  TCD pending        REVIEW OF SYSTEMS:  - unable to obtain given mental status    VITALS:   - Reviewed    IMAGING/DATA:   - Reviewed     ALLERGIES:   - penicillin (Other)        PHYSICAL EXAM:    General: intubated  CVS: RRR  Pulm: CTAB  GI: Soft, NTND  Extremities: No LE Edema  Neuro: No EO to nox, 2mm very sluggish R, + cough/gag, +overbreathes, no FC, BUE minimal movement to noxious, BLE WD to noxious   NSCU Progress Note    Assessment/Hospital Course:    61M hx of HTN, DM, who was BIBEMS as a code stroke w/ LKW ~0830 am when he c/o HA with n/v, EMS activated found patient to be incoherent, hypertensive 220s and bradycardic 50s and ,eventually unresponsive in ED requiring intubation for GCS/Airway protection, CTH with large posterior fossa bleed w/ IVH, CTA w/ no LVO, large hyperdense focus in 4th vent suggesting active bleed vs. aneurysm, in ED with poor exam, EVD placed @15 adn admitted to NSICU for further management of AVM vs. HTSV ICH.  11/4: Admitted. intubated for GCS/Airway protection. EVD placed in ED POD0 SOC  11/5: s/p diagnostic angio L PICA aneurysm, untreated    24 Hour Events/Subjective:  - HTS 2%  exam waxing and waning, on cardene  TCD pending  Cr worsening        REVIEW OF SYSTEMS:  - unable to obtain given mental status    VITALS:   - Reviewed    IMAGING/DATA:   - Reviewed     ALLERGIES:   - penicillin (Other)        PHYSICAL EXAM:    General: intubated  CVS: RRR  Pulm: CTAB  GI: Soft, NTND  Extremities: No LE Edema  Neuro: No EO to nox, 2mm very sluggish R, + cough/gag, +overbreathes, no FC, BUE minimal movement to noxious, BLE WD to noxious   NSCU Progress Note    Assessment/Hospital Course:    61M hx of HTN, DM, who was BIBEMS as a code stroke w/ LKW ~0830 am when he c/o HA with n/v, EMS activated found patient to be incoherent, hypertensive 220s and bradycardic 50s and ,eventually unresponsive in ED requiring intubation for GCS/Airway protection, CTH with large posterior fossa bleed w/ IVH, CTA w/ no LVO, large hyperdense focus in 4th vent suggesting active bleed vs. aneurysm, in ED with poor exam, EVD placed @15 adn admitted to NSICU for further management of AVM vs. HTSV ICH.  11/4: Admitted. intubated for GCS/Airway protection. EVD placed in ED POD0 SOC  11/5: s/p diagnostic angio L PICA aneurysm, untreated    24 Hour Events/Subjective:  - HTS 2%  exam waxing and waning, on cardene  TCD pending  Cr worsening        REVIEW OF SYSTEMS:  - unable to obtain given mental status    VITALS:   - Reviewed    IMAGING/DATA:   - Reviewed     ALLERGIES:   - penicillin (Other)        PHYSICAL EXAM:    General: intubated  CVS: RRR  Pulm: CTAB  GI: Soft, NTND  Extremities: No LE Edema  Neuro: No EO to nox, 2mm very sluggish R, + cough/gag, +overbreathes, no FC, RUE minimal movement spont, LUE 2/5 , BLE WD to noxious

## 2021-11-07 NOTE — PROGRESS NOTE ADULT - SUBJECTIVE AND OBJECTIVE BOX
NSCU Progress Note    Assessment/Hospital Course:    61M hx of HTN, DM, who was BIBEMS as a code stroke w/ LKW ~0830 am when he c/o HA with n/v, EMS activated found patient to be incoherent, hypertensive 220s and bradycardic 50s and ,eventually unresponsive in ED requiring intubation for GCS/Airway protection, CTH with large posterior fossa bleed w/ IVH, CTA w/ no LVO, large hyperdense focus in 4th vent suggesting active bleed vs. aneurysm, in ED with poor exam, EVD placed @15 adn admitted to NSICU for further management of AVM vs. HTSV ICH.  11/4: Admitted. intubated for GCS/Airway protection. EVD placed in ED POD0 SOC  11/5: s/p diagnostic angio L PICA aneurysm, untreated    remains intubated  taken off hypertonics    REVIEW OF SYSTEMS:  - unable to obtain given mental status    VITALS:   - Reviewed    IMAGING/DATA:   - Reviewed     ALLERGIES:   - penicillin (Other)    PHYSICAL EXAM:    General: intubated  CVS: RRR  Pulm: CTAB  GI: Soft, NTND  Extremities: No LE Edema  Neuro: No EO to nox, 2mm very sluggish R, + cough/gag, +overbreathes, no FC, BUE minimal movement to noxious, BLE WD to noxious   NSCU Progress Note    Assessment/Hospital Course:    61M hx of HTN, DM, who was BIBEMS as a code stroke w/ LKW ~0830 am when he c/o HA with n/v, EMS activated found patient to be incoherent, hypertensive 220s and bradycardic 50s and ,eventually unresponsive in ED requiring intubation for GCS/Airway protection, CTH with large posterior fossa bleed w/ IVH, CTA w/ no LVO, large hyperdense focus in 4th vent suggesting active bleed vs. aneurysm, in ED with poor exam, EVD placed @15 adn admitted to NSICU for further management of AVM vs. HTSV ICH.  11/4: Admitted. intubated for GCS/Airway protection. EVD placed in ED POD0 SOC  11/5: s/p diagnostic angio L PICA aneurysm, untreated    remains intubated  taken off hypertonics    REVIEW OF SYSTEMS:  - unable to obtain given mental status    VITALS:   - Reviewed    IMAGING/DATA:   - Reviewed     ALLERGIES:   - penicillin (Other)    PHYSICAL EXAM:    General: intubated  CVS: RRR  Pulm: CTAB  GI: Soft, NTND  Extremities: No LE Edema  Neuro: No EO to nox, 2mm reactive b/l, occasional weak cough with stimulation but mostly none, +overbreathes, RUE distal AG shows 2fingers, RLE at at least 2/5 wiggles toes to command, LUE no movement, LLE TF

## 2021-11-07 NOTE — PROGRESS NOTE ADULT - ASSESSMENT
ASSESSMENT/PLAN:    63M hx of HTN, DM p/w posterior fossa heme c/b IVH status post EVD, intubated for airway protection, 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/5) 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, untreated    NEURO:  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/5), AV fistula and related aneurysm untreated  - neuro checks q1  CTH w new L cerebellar hypodensity  Plan for angio next week  - EVD@15, monitor output--minimal, but patent, drain care, ICP<22  - pain control  - consider MRI     CVS:  Afib RVR, HTN  - SBP goal 100-140  - cardene PRN to goal  - TTE  - labetalol 100mg q8h    PULM: resp failure requiring mech vent  Intubated for airway protection  - ETT to vent  - vent bundle  - chest PT  - ABG daily, titrate vent settings  - PS as tolerated    RENAL:  MICHELLE stable now  - Fluids: IVL now Na above goal  - BMP q8  - Na goal 145-155  - trend lytes  - daily IOs    GI:  - Diet: TF  - GI prophylaxis: PPI while intubated  - Bowel regimen    ENDO: DM, hyperglycemia  A1c 6.4%  - FS goal 120-180  - MISS  - NPH 4q6-->increased    HEME/ONC:  - SCDs  - Chemoppx: unsecured ruptured aneurysm  f/u BLE dopplers to r/o DVTs; high risk as Patient immobile     ID:  - monitor for fevers    Patient is at high risk of neurologic deterioration/death due to:  brain herniation, heme expansion, cerebral edema, ICP crisis, DVT/PE     ASSESSMENT/PLAN:    63M hx of HTN, DM p/w posterior fossa heme c/b IVH status post EVD, intubated for airway protection, 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/5) 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, untreated    NEURO:  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/5), AV fistula and related aneurysm untreated  - neuro checks q1  CTH w new L cerebellar hypodensity  Plan for angio next week  - EVD@15, monitor output--minimal, but patent, drain care, ICP<22  - pain control    CVS:  Afib RVR, HTN  - SBP goal 100-140  - cardene PRN to goal  - TTE  - labetalol 100mg q8h and hydralazine 25mg tid-->increase to 50tid, still on nicardipine gtt    PULM: resp failure requiring mech vent  Intubated for airway protection  - ETT to vent  - vent bundle  - chest PT  - ABG daily, titrate vent settings  - PS as tolerated    RENAL:  MICHELLE stable now  - Fluids: IVL now Na above goal  - BMP q8  - Na goal 145-155  - trend lytes  - daily IOs    GI:  - Diet: TF  - GI prophylaxis: PPI while intubated  - Bowel regimen    ENDO: DM, hyperglycemia  A1c 6.4%  - FS goal 120-180  - MISS  - NPH 4q6-->increased to 8U q6    HEME/ONC:  - SCDs  - Chemoppx: unsecured ruptured aneurysm  f/u BLE dopplers to r/o DVTs; high risk as Patient immobile     ID:  - monitor for fevers    Patient is at high risk of neurologic deterioration/death due to:  brain herniation, heme expansion, cerebral edema, ICP crisis, DVT/PE

## 2021-11-07 NOTE — PROGRESS NOTE ADULT - ASSESSMENT
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 active hemorrhage, possibly HTNive hemorrhage given location and presented with SBP>200s.  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, untreated  CTH today  - neuro checks q1  - EVD@15, monitor output, drain care, ICP<22  - pain control  - lipitor    CVS:  Afib RVR on admission  - SBP goal 100-140  - cardene PRN to goal  - TTE  - labetalol 100mg q8h    PULM: resp failure requiring mech vent  Intubated for airway protection  - ETT to vent  - vent bundle  - chest PT  - ABG daily, titrate vent settings  - PS as tolerated    RENAL:  - Fluids: HTS2%@50  - BMPq6h  - Na goal 145-150  - trend lytes  - daily IOs    GI:  - Diet: TF  - GI prophylaxis: PPI while intubated  - Bowel regimen    ENDO:   A1c 6.4%  - FS goal 120-180  - MISS  - NPH 8q6    HEME/ONC:  - SCDs  - Chemoppx: unsecured aneurysm    ID:  - monitor for fevers      Patient is at high risk of neurologic deterioration/death due to:  brain herniation, heme expansion, cerebral edema, ICP crisis      Time spent: 35 critical care minutes ASSESSMENT/PLAN:    63M hx of HTN, DM p/w posterior fossa heme c/b IVH status post EVD, intubated for airway protection, 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 active hemorrhage, possibly HTNive hemorrhage given location and presented with SBP>200s.  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, untreated  CTH today  - neuro checks q1  - EVD@15, monitor output, drain care, ICP<22  - pain control  - lipitor    CVS:  Afib RVR on admission  - SBP goal 100-140  - cardene PRN to goal  - TTE  - labetalol 100mg q8h    PULM: resp failure requiring mech vent  Intubated for airway protection  - ETT to vent  - vent bundle  - chest PT  - ABG daily, titrate vent settings  - PS as tolerated    RENAL: Cr worsening will get renal studies urine lytes, monitor, unsure of baseline might be ckd  - Fluids: HTS2%@50  - BMPq6h  - Na goal 145-150  - trend lytes  - daily IOs    GI:  - Diet: TF  - GI prophylaxis: PPI while intubated  - Bowel regimen    ENDO:   A1c 6.4%  - FS goal 120-180  - MISS  - NPH 8q6    HEME/ONC:  - SCDs  - Chemoppx: unsecured aneurysm    ID:  - monitor for fevers      Patient is at high risk of neurologic deterioration/death due to:  brain herniation, heme expansion, cerebral edema, ICP crisis      Time spent: 35 critical care minutes ASSESSMENT/PLAN:    63M hx of HTN, DM p/w posterior fossa heme c/b IVH status post EVD, intubated for airway protection, 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 active hemorrhage, possibly HTNive hemorrhage given location and presented with SBP>200s.  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, untreated  CTH today- as above   - neuro checks q1  - EVD@15, monitor output, drain care, ICP<22  - pain control  - lipitor    CVS:  Afib RVR on admission  - SBP goal 100-140  - cardene PRN to goal  - TTE  - labetalol 100mg q8h    PULM: resp failure requiring mech vent  Intubated for airway protection  - CPAP trials   - vent bundle  - chest PT  - ABG daily, titrate vent settings  - PS as tolerated    RENAL: Cr worsening will get renal studies urine lytes, monitor, unsure of baseline might be ckd  - Fluids: HTS2%@50  - BMPq8h  - Na goal 145-155  - trend lytes  - daily IOs    GI:  - Diet: TF  - GI prophylaxis: PPI while intubated  - Bowel regimen- last BM - PTA  Senna / Miralax q 12 / Mg Citrate prn  q day     ENDO:   A1c 6.4%  - FS goal 120-180  - MISS  - NPH 8q6    HEME/ONC:  - SCDs  - Chemoppx: unsecured aneurysm    ID:  - monitor for fevers      Patient is at high risk of neurologic deterioration/death due to:  brain herniation, heme expansion, cerebral edema, ICP crisis      Time spent: 35 critical care minutes

## 2021-11-07 NOTE — PROGRESS NOTE ADULT - ATTENDING COMMENTS
63M hx of HTN, DM p/w posterior fossa heme c/b IVH status post EVD, intubated for airway protection, 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, untreated  Case reviewed, vitals, labs, meds and imaginging  Physical exam performed   Assessment and plan formulated by myself and team .

## 2021-11-08 LAB
ALBUMIN SERPL ELPH-MCNC: 3.2 G/DL — LOW (ref 3.3–5)
ALP SERPL-CCNC: 92 U/L — SIGNIFICANT CHANGE UP (ref 40–120)
ALT FLD-CCNC: 32 U/L — SIGNIFICANT CHANGE UP (ref 10–45)
ANION GAP SERPL CALC-SCNC: 11 MMOL/L — SIGNIFICANT CHANGE UP (ref 5–17)
ANION GAP SERPL CALC-SCNC: 11 MMOL/L — SIGNIFICANT CHANGE UP (ref 5–17)
ANION GAP SERPL CALC-SCNC: 12 MMOL/L — SIGNIFICANT CHANGE UP (ref 5–17)
AST SERPL-CCNC: 23 U/L — SIGNIFICANT CHANGE UP (ref 10–40)
BILIRUB SERPL-MCNC: 0.5 MG/DL — SIGNIFICANT CHANGE UP (ref 0.2–1.2)
BUN SERPL-MCNC: 32 MG/DL — HIGH (ref 7–23)
BUN SERPL-MCNC: 32 MG/DL — HIGH (ref 7–23)
BUN SERPL-MCNC: 36 MG/DL — HIGH (ref 7–23)
CALCIUM SERPL-MCNC: 8.8 MG/DL — SIGNIFICANT CHANGE UP (ref 8.4–10.5)
CALCIUM SERPL-MCNC: 9.1 MG/DL — SIGNIFICANT CHANGE UP (ref 8.4–10.5)
CALCIUM SERPL-MCNC: 9.2 MG/DL — SIGNIFICANT CHANGE UP (ref 8.4–10.5)
CHLORIDE SERPL-SCNC: 122 MMOL/L — HIGH (ref 96–108)
CHLORIDE SERPL-SCNC: 124 MMOL/L — HIGH (ref 96–108)
CHLORIDE SERPL-SCNC: 124 MMOL/L — HIGH (ref 96–108)
CO2 SERPL-SCNC: 22 MMOL/L — SIGNIFICANT CHANGE UP (ref 22–31)
CO2 SERPL-SCNC: 23 MMOL/L — SIGNIFICANT CHANGE UP (ref 22–31)
CO2 SERPL-SCNC: 24 MMOL/L — SIGNIFICANT CHANGE UP (ref 22–31)
CREAT SERPL-MCNC: 1.26 MG/DL — SIGNIFICANT CHANGE UP (ref 0.5–1.3)
CREAT SERPL-MCNC: 1.32 MG/DL — HIGH (ref 0.5–1.3)
CREAT SERPL-MCNC: 1.46 MG/DL — HIGH (ref 0.5–1.3)
GAS PNL BLDA: SIGNIFICANT CHANGE UP
GLUCOSE SERPL-MCNC: 154 MG/DL — HIGH (ref 70–99)
GLUCOSE SERPL-MCNC: 166 MG/DL — HIGH (ref 70–99)
GLUCOSE SERPL-MCNC: 180 MG/DL — HIGH (ref 70–99)
MAGNESIUM SERPL-MCNC: 2.4 MG/DL — SIGNIFICANT CHANGE UP (ref 1.6–2.6)
MAGNESIUM SERPL-MCNC: 2.5 MG/DL — SIGNIFICANT CHANGE UP (ref 1.6–2.6)
MAGNESIUM SERPL-MCNC: 2.6 MG/DL — SIGNIFICANT CHANGE UP (ref 1.6–2.6)
PHOSPHATE SERPL-MCNC: 2.4 MG/DL — LOW (ref 2.5–4.5)
PHOSPHATE SERPL-MCNC: 3.4 MG/DL — SIGNIFICANT CHANGE UP (ref 2.5–4.5)
PHOSPHATE SERPL-MCNC: 3.9 MG/DL — SIGNIFICANT CHANGE UP (ref 2.5–4.5)
POTASSIUM SERPL-MCNC: 3.6 MMOL/L — SIGNIFICANT CHANGE UP (ref 3.5–5.3)
POTASSIUM SERPL-MCNC: 3.8 MMOL/L — SIGNIFICANT CHANGE UP (ref 3.5–5.3)
POTASSIUM SERPL-MCNC: 3.9 MMOL/L — SIGNIFICANT CHANGE UP (ref 3.5–5.3)
POTASSIUM SERPL-SCNC: 3.6 MMOL/L — SIGNIFICANT CHANGE UP (ref 3.5–5.3)
POTASSIUM SERPL-SCNC: 3.8 MMOL/L — SIGNIFICANT CHANGE UP (ref 3.5–5.3)
POTASSIUM SERPL-SCNC: 3.9 MMOL/L — SIGNIFICANT CHANGE UP (ref 3.5–5.3)
PROT SERPL-MCNC: 6.1 G/DL — SIGNIFICANT CHANGE UP (ref 6–8.3)
SODIUM SERPL-SCNC: 157 MMOL/L — HIGH (ref 135–145)
SODIUM SERPL-SCNC: 158 MMOL/L — HIGH (ref 135–145)
SODIUM SERPL-SCNC: 158 MMOL/L — HIGH (ref 135–145)

## 2021-11-08 PROCEDURE — 93886 INTRACRANIAL COMPLETE STUDY: CPT | Mod: 26

## 2021-11-08 PROCEDURE — 71045 X-RAY EXAM CHEST 1 VIEW: CPT | Mod: 26,59

## 2021-11-08 PROCEDURE — 99291 CRITICAL CARE FIRST HOUR: CPT

## 2021-11-08 PROCEDURE — 93970 EXTREMITY STUDY: CPT | Mod: 26

## 2021-11-08 PROCEDURE — 70450 CT HEAD/BRAIN W/O DYE: CPT | Mod: 26

## 2021-11-08 RX ORDER — FENTANYL CITRATE 50 UG/ML
25 INJECTION INTRAVENOUS ONCE
Refills: 0 | Status: DISCONTINUED | OUTPATIENT
Start: 2021-11-08 | End: 2021-11-08

## 2021-11-08 RX ORDER — POTASSIUM CHLORIDE 20 MEQ
40 PACKET (EA) ORAL ONCE
Refills: 0 | Status: COMPLETED | OUTPATIENT
Start: 2021-11-08 | End: 2021-11-08

## 2021-11-08 RX ORDER — DEXAMETHASONE 0.5 MG/5ML
4 ELIXIR ORAL EVERY 8 HOURS
Refills: 0 | Status: DISCONTINUED | OUTPATIENT
Start: 2021-11-08 | End: 2021-11-12

## 2021-11-08 RX ORDER — LABETALOL HCL 100 MG
100 TABLET ORAL EVERY 8 HOURS
Refills: 0 | Status: DISCONTINUED | OUTPATIENT
Start: 2021-11-08 | End: 2021-11-09

## 2021-11-08 RX ORDER — NICARDIPINE HYDROCHLORIDE 30 MG/1
5 CAPSULE, EXTENDED RELEASE ORAL
Qty: 40 | Refills: 0 | Status: DISCONTINUED | OUTPATIENT
Start: 2021-11-08 | End: 2021-11-09

## 2021-11-08 RX ORDER — SODIUM CHLORIDE 9 MG/ML
1000 INJECTION INTRAMUSCULAR; INTRAVENOUS; SUBCUTANEOUS
Refills: 0 | Status: DISCONTINUED | OUTPATIENT
Start: 2021-11-08 | End: 2021-11-08

## 2021-11-08 RX ORDER — LABETALOL HCL 100 MG
200 TABLET ORAL EVERY 8 HOURS
Refills: 0 | Status: DISCONTINUED | OUTPATIENT
Start: 2021-11-08 | End: 2021-11-08

## 2021-11-08 RX ORDER — POTASSIUM CHLORIDE 20 MEQ
20 PACKET (EA) ORAL ONCE
Refills: 0 | Status: DISCONTINUED | OUTPATIENT
Start: 2021-11-08 | End: 2021-11-08

## 2021-11-08 RX ORDER — SODIUM CHLORIDE 9 MG/ML
500 INJECTION, SOLUTION INTRAVENOUS ONCE
Refills: 0 | Status: COMPLETED | OUTPATIENT
Start: 2021-11-08 | End: 2021-11-08

## 2021-11-08 RX ORDER — SODIUM CHLORIDE 9 MG/ML
1000 INJECTION, SOLUTION INTRAVENOUS
Refills: 0 | Status: DISCONTINUED | OUTPATIENT
Start: 2021-11-08 | End: 2021-11-11

## 2021-11-08 RX ORDER — POTASSIUM PHOSPHATE, MONOBASIC POTASSIUM PHOSPHATE, DIBASIC 236; 224 MG/ML; MG/ML
15 INJECTION, SOLUTION INTRAVENOUS ONCE
Refills: 0 | Status: COMPLETED | OUTPATIENT
Start: 2021-11-08 | End: 2021-11-08

## 2021-11-08 RX ADMIN — FENTANYL CITRATE 25 MICROGRAM(S): 50 INJECTION INTRAVENOUS at 02:45

## 2021-11-08 RX ADMIN — CHLORHEXIDINE GLUCONATE 1 APPLICATION(S): 213 SOLUTION TOPICAL at 21:01

## 2021-11-08 RX ADMIN — POLYETHYLENE GLYCOL 3350 17 GRAM(S): 17 POWDER, FOR SOLUTION ORAL at 21:01

## 2021-11-08 RX ADMIN — Medication 4 MILLIGRAM(S): at 21:02

## 2021-11-08 RX ADMIN — POTASSIUM PHOSPHATE, MONOBASIC POTASSIUM PHOSPHATE, DIBASIC 62.5 MILLIMOLE(S): 236; 224 INJECTION, SOLUTION INTRAVENOUS at 06:45

## 2021-11-08 RX ADMIN — Medication 50 MILLIGRAM(S): at 21:01

## 2021-11-08 RX ADMIN — HUMAN INSULIN 8 UNIT(S): 100 INJECTION, SUSPENSION SUBCUTANEOUS at 23:37

## 2021-11-08 RX ADMIN — Medication 8: at 23:38

## 2021-11-08 RX ADMIN — HUMAN INSULIN 8 UNIT(S): 100 INJECTION, SUSPENSION SUBCUTANEOUS at 00:28

## 2021-11-08 RX ADMIN — Medication 4: at 12:34

## 2021-11-08 RX ADMIN — NICARDIPINE HYDROCHLORIDE 25 MG/HR: 30 CAPSULE, EXTENDED RELEASE ORAL at 13:59

## 2021-11-08 RX ADMIN — SENNA PLUS 2 TABLET(S): 8.6 TABLET ORAL at 21:01

## 2021-11-08 RX ADMIN — ATORVASTATIN CALCIUM 40 MILLIGRAM(S): 80 TABLET, FILM COATED ORAL at 21:01

## 2021-11-08 RX ADMIN — Medication 50 MILLIGRAM(S): at 05:16

## 2021-11-08 RX ADMIN — Medication 100 MILLIGRAM(S): at 05:16

## 2021-11-08 RX ADMIN — Medication 50 MILLIGRAM(S): at 12:38

## 2021-11-08 RX ADMIN — SODIUM CHLORIDE 1000 MILLILITER(S): 9 INJECTION, SOLUTION INTRAVENOUS at 22:01

## 2021-11-08 RX ADMIN — PANTOPRAZOLE SODIUM 40 MILLIGRAM(S): 20 TABLET, DELAYED RELEASE ORAL at 12:37

## 2021-11-08 RX ADMIN — Medication 200 MILLIGRAM(S): at 13:59

## 2021-11-08 RX ADMIN — Medication 40 MILLIEQUIVALENT(S): at 06:45

## 2021-11-08 RX ADMIN — Medication 25 MILLIGRAM(S): at 00:28

## 2021-11-08 RX ADMIN — Medication 4: at 00:28

## 2021-11-08 RX ADMIN — HUMAN INSULIN 8 UNIT(S): 100 INJECTION, SUSPENSION SUBCUTANEOUS at 12:36

## 2021-11-08 RX ADMIN — Medication 200 MILLIGRAM(S): at 21:01

## 2021-11-08 RX ADMIN — FENTANYL CITRATE 25 MICROGRAM(S): 50 INJECTION INTRAVENOUS at 03:00

## 2021-11-08 RX ADMIN — Medication 4 MILLIGRAM(S): at 15:47

## 2021-11-08 RX ADMIN — CHLORHEXIDINE GLUCONATE 15 MILLILITER(S): 213 SOLUTION TOPICAL at 05:16

## 2021-11-08 RX ADMIN — CHLORHEXIDINE GLUCONATE 15 MILLILITER(S): 213 SOLUTION TOPICAL at 17:30

## 2021-11-08 RX ADMIN — HUMAN INSULIN 8 UNIT(S): 100 INJECTION, SUSPENSION SUBCUTANEOUS at 05:16

## 2021-11-08 RX ADMIN — Medication 4: at 05:16

## 2021-11-08 NOTE — PROGRESS NOTE ADULT - ATTENDING COMMENTS
63M hx of HTN, DM p/w posterior fossa heme c/b IVH status post EVD, intubated for airway protection, 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, untreated  Case reviewed, vitals, labs, meds and imaginging  Physical exam performed   Assessment and plan formulated by myself and team . 63M hx of HTN, DM p/w posterior fossa heme c/b IVH status post EVD, intubated for airway protection, 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, untreated  Case reviewed, vitals, labs, meds and imaginging  Physical exam performed   Assessment and plan formulated by myself, fellow  and team .

## 2021-11-08 NOTE — DIETITIAN INITIAL EVALUATION ADULT. - OTHER INFO
Dosing wt (11/5): 190.96 lbs  Wt trend (per [facility] [place]): (8/25/20): 170 lbs; (3/5/20): 179 lbs.   Apparent overall wt gain noted. Unclear etiology and accuracy. Will request re-weight to confirm.     Pt currently prescribed Glucerna 1.2 at 60ml/hr x 24 hrs (1440ml, 1728kcal and 86g protein). Feeds initiated 11/5. Infusing at goal rate (60ml/hr) at time of RD visit. Appears to be tolerating well with no documented GI distress. Last BM (11/7): x 2. On bowel regimen (senna, Miralax).     A1c 6.5%, indicating moderate glycemic control PTA. Unclear if pt adhered to therapeutic diet or antihyperglycemic regimen. Currently prescribed NPH 8u q 6 hrs and insulin lispro sliding scale to aid in management of BG.     Skin: surgical incision suboccipital crani, right groin s/p angio  Edema: none noted

## 2021-11-08 NOTE — PROGRESS NOTE ADULT - SUBJECTIVE AND OBJECTIVE BOX
NSCU Progress Note    Assessment/Hospital Course:    61M hx of HTN, DM, who was BIBEMS as a code stroke w/ LKW ~0830 am when he c/o HA with n/v, EMS activated found patient to be incoherent, hypertensive 220s and bradycardic 50s and ,eventually unresponsive in ED requiring intubation for GCS/Airway protection, CTH with large posterior fossa bleed w/ IVH, CTA w/ no LVO, large hyperdense focus in 4th vent suggesting active bleed vs. aneurysm, in ED with poor exam, EVD placed @15 adn admitted to NSICU for further management of AVM vs. HTSV ICH.  11/4: Admitted. intubated for GCS/Airway protection. EVD placed in ED POD0 SOC  11/5: s/p diagnostic angio L PICA aneurysm, untreated    24 Hour Events/Subjective:  - HTS 2%  exam waxing and waning, on cardene  TCD pending  Cr worsening        REVIEW OF SYSTEMS:  - unable to obtain given mental status    VITALS:   - Reviewed    IMAGING/DATA:   - Reviewed     ALLERGIES:   - penicillin (Other)        PHYSICAL EXAM:    General: intubated  CVS: RRR  Pulm: CTAB  GI: Soft, NTND  Extremities: No LE Edema  Neuro: No EO to nox, 2mm very sluggish R, + cough/gag, +overbreathes, no FC, RUE minimal movement spont, LUE 2/5 , BLE WD to noxious   NSCU Progress Note    Assessment/Hospital Course:    61M hx of HTN, DM, who was BIBEMS as a code stroke w/ LKW ~0830 am when he c/o HA with n/v, EMS activated found patient to be incoherent, hypertensive 220s and bradycardic 50s and ,eventually unresponsive in ED requiring intubation for GCS/Airway protection, CTH with large posterior fossa bleed w/ IVH, CTA w/ no LVO, large hyperdense focus in 4th vent suggesting active bleed vs. aneurysm, in ED with poor exam, EVD placed @15 adn admitted to NSICU for further management of AVM vs. HTSV ICH.  11/4: Admitted. intubated for GCS/Airway protection. EVD placed in ED POD0 SOC  11/5: s/p diagnostic angio L PICA aneurysm, untreated    24 Hour Events/Subjective:  - HTS 2%  exam waxing and waning, on cardene  TCD pending  Cr worsening        REVIEW OF SYSTEMS:  - unable to obtain given mental status    VITALS:     ICU Vital Signs Last 24 Hrs  T(C): 37 (08 Nov 2021 11:00), Max: 37.3 (08 Nov 2021 03:00)  T(F): 98.6 (08 Nov 2021 11:00), Max: 99.1 (08 Nov 2021 03:00)  HR: 80 (08 Nov 2021 11:00) (62 - 87)  BP: 121/72 (07 Nov 2021 21:30) (117/75 - 155/82)  BP(mean): 86 (07 Nov 2021 21:30) (82 - 103)  ABP: 141/59 (08 Nov 2021 11:00) (106/76 - 172/69)  ABP(mean): 82 (08 Nov 2021 11:00) (61 - 101)  RR: 20 (08 Nov 2021 11:00) (12 - 24)  SpO2: 98% (08 Nov 2021 11:00) (93% - 100%)    MEDICATIONS  (STANDING):  atorvastatin 40 milliGRAM(s) Oral at bedtime  chlorhexidine 0.12% Liquid 15 milliLiter(s) Oral Mucosa two times a day  chlorhexidine 4% Liquid 1 Application(s) Topical <User Schedule>  dextrose 50% Injectable 25 Gram(s) IV Push once  dextrose 50% Injectable 12.5 Gram(s) IV Push once  dextrose 50% Injectable 25 Gram(s) IV Push once  hydrALAZINE 50 milliGRAM(s) Oral three times a day  influenza   Vaccine 0.5 milliLiter(s) IntraMuscular once  insulin lispro (ADMELOG) corrective regimen sliding scale   SubCutaneous every 6 hours  insulin NPH human recombinant 8 Unit(s) SubCutaneous every 6 hours  labetalol 100 milliGRAM(s) Oral every 8 hours  niCARdipine Infusion 5 mG/Hr (25 mL/Hr) IV Continuous <Continuous>  pantoprazole  Injectable 40 milliGRAM(s) IV Push daily  polyethylene glycol 3350 17 Gram(s) Oral at bedtime  senna 2 Tablet(s) Oral at bedtime          IMAGING/DATA:   Xray Chest 1 View- PORTABLE-Routine (Xray Chest 1 View- PORTABLE-Routine in AM.) (11.08.21 @ 04:30) >  IMPRESSION:    The heart is normal in size. The Lungs are clear. No pleural effusion. No pneumothorax. Endotracheal tube is in good position. No acute bony pathology could be identified.    CT Head No Cont (11.07.21 @ 08:39) >  fossa surgical drain.    Redemonstration of bilateral temporal and parietal subarachnoid hemorrhage which appears grossly unchanged. There is also redemonstration of intraventricular hemorrhage as well as subdural hemorrhage along the bilateral tentorial leaflets which appears grossly unchanged. No new areas of acute intracranial hemorrhage are seen. There is no shift of the midline structures or herniation.    An evolving acute infarct within the left medial cerebellar hemisphere extending to the left cerebellar tonsil is again seen.    Rightfrontal approach ventriculoperitoneal shunt catheter approximating the third ventricle is unchanged in position. Ventricular size and configuration is unchanged. The paranasal sinuses and mastoid air cells are clear. Partially visualized endotrachealand nasogastric tubes are again noted.    IMPRESSION: Stable follow-up CT study.                          8.7    14.18 )-----------( 209      ( 07 Nov 2021 21:32 )             27.8       11-08    158<H>  |  124<H>  |  32<H>  ----------------------------<  180<H>  3.6   |  23  |  1.32<H>    Ca    9.2      08 Nov 2021 05:11  Phos  2.4     11-08  Mg     2.5     11-08    RMCA - 100  R RAYNA- 77  L MCA- 91   LACA- 91    CAPILLARY BLOOD GLUCOSE      POCT Blood Glucose.: 161 mg/dL (08 Nov 2021 05:13)  POCT Blood Glucose.: 180 mg/dL (08 Nov 2021 00:23)  POCT Blood Glucose.: 165 mg/dL (07 Nov 2021 17:03)      PHYSICAL EXAM:    General: intubated  CVS: RRR  Pulm: CTAB  GI: Soft, NTND  Extremities: No LE Edema  Neuro: No EO to nox, 2mm NR B/L , No dolls, , -cough/gag, +overbreathes, no FC, RUE extt, LUE 0/5 , BLE TF

## 2021-11-08 NOTE — DIETITIAN INITIAL EVALUATION ADULT. - PERTINENT LABORATORY DATA
(11/4): A1c 6.5%, Estimated Average Glucose 140  (11/7): Hgb 8.7, Hct 27.8  (11/8): POCT Blood Glucose 161, 180; Na 158, BUN 32, Cr 1.32, Glu 180, GFR 56, Phos 2.4

## 2021-11-08 NOTE — DIETITIAN INITIAL EVALUATION ADULT. - CHIEF COMPLAINT
The patient is a 60 yo M with PMH: HTN, DM. Admitted as a code stroke with c/o HA, N/V. Found patient to be incoherent, hypertensive and bradycardic, eventually unresponsive requiring intubation for GCS/airway protection. CTH with large posterior fossa bleed with IVH, CTA with no LVO, large hyperdense focus in 4th ventricle suggesting active bleed vs aneurysm. EVD placed. S/P diagnostic angio L PICA aneurysm 11/5, untreated. Exam waxing and waning. Remains intubated at this time.

## 2021-11-08 NOTE — DIETITIAN INITIAL EVALUATION ADULT. - ADD RECOMMEND
1. Obtain further subjective diet/wt history from pt/family PRN. 2. Monitor GI tolerance. RD to remain available to adjust EN formulary, volume/rate PRN. 3. Monitor wt trends/labs/skin integrity/hydration status/bowel regularity.
No

## 2021-11-08 NOTE — PROGRESS NOTE ADULT - ASSESSMENT
ANGLE ALICIA  61M pmhx HTN, DM at work complaining of HA ~8:30, starting feeling dizzy and vomiting, HTN/keke when EMS got to him. SBP 220s, HR 50s. CTH shows large posterior fossa bleed w/ IVH/SAH/hydro.   -Adm NSCU, q1h neuro check  -EVD in ED, drainage at 15  -exam improving  -s/p Angio, no embo

## 2021-11-08 NOTE — PROGRESS NOTE ADULT - SUBJECTIVE AND OBJECTIVE BOX
Patient seen and examined at bedside.    --Anticoagulation--    T(C): 37.2 (11-07-21 @ 23:00), Max: 37.2 (11-07-21 @ 15:00)  HR: 81 (11-08-21 @ 01:00) (68 - 96)  BP: 121/72 (11-07-21 @ 21:30) (117/75 - 155/82)  RR: 12 (11-08-21 @ 01:00) (10 - 23)  SpO2: 96% (11-08-21 @ 01:00) (93% - 100%)  Wt(kg): --    Exam:    intubated, + corneals, pupils 3 NR, + cough, no FC, R side WD, LUE no mvmt, LLE TF

## 2021-11-08 NOTE — DIETITIAN INITIAL EVALUATION ADULT. - ORAL INTAKE PTA/DIET HISTORY
Pt currently intubated; unable to obtain subjective wt/diet history at this time. No documented food allergies. Baseline tolerance to chewing/swallowing and baseline provision of energy/nutrient intake unclear. Vitamin/supplement history unknown.

## 2021-11-08 NOTE — PROGRESS NOTE ADULT - SUBJECTIVE AND OBJECTIVE BOX
NSCU Progress Note    Assessment/Hospital Course:    61M hx of HTN, DM, who was BIBEMS as a code stroke w/ LKW ~0830 am when he c/o HA with n/v, EMS activated found patient to be incoherent, hypertensive 220s and bradycardic 50s and ,eventually unresponsive in ED requiring intubation for GCS/Airway protection, CTH with large posterior fossa bleed w/ IVH, CTA w/ no LVO, large hyperdense focus in 4th vent suggesting active bleed vs. aneurysm, in ED with poor exam, EVD placed @15 adn admitted to NSICU for further management of AVM vs. HTSV ICH.  11/4: Admitted. intubated for GCS/Airway protection. EVD placed in ED POD0 SOC  11/5: s/p diagnostic angio L PICA aneurysm, untreated    remains intubated  taken off hypertonics    REVIEW OF SYSTEMS:  - unable to obtain given mental status    VITALS:   - Reviewed    IMAGING/DATA:   - Reviewed     ALLERGIES:   - penicillin (Other)    PHYSICAL EXAM:    General: intubated  CVS: RRR  Pulm: CTAB  GI: Soft, NTND  Extremities: No LE Edema  Neuro: No EO to nox, 2mm reactive b/l, occasional weak cough with stimulation but mostly none, +overbreathes, RUE distal AG shows 2fingers, RLE at at least 2/5 wiggles toes to command, LUE no movement, LLE TF   NSCU Progress Note    Assessment/Hospital Course:    61M hx of HTN, DM, who was BIBEMS as a code stroke w/ LKW ~0830 am when he c/o HA with n/v, EMS activated found patient to be incoherent, hypertensive 220s and bradycardic 50s and ,eventually unresponsive in ED requiring intubation for GCS/Airway protection, CTH with large posterior fossa bleed w/ IVH, CTA w/ no LVO, large hyperdense focus in 4th vent suggesting active bleed vs. aneurysm, in ED with poor exam, EVD placed @15 adn admitted to NSICU for further management of AVM vs. HTSV ICH.  11/4: Admitted. intubated for GCS/Airway protection. EVD placed in ED POD0 SOC  11/5: s/p diagnostic angio L PICA aneurysm, untreated    acute exam change early in afternoon--fixed pupils and no corneals/gag/cough, CTH done, concern for increased bleed, EVD dropped to [id number]  remains intubated    REVIEW OF SYSTEMS:  - unable to obtain given mental status    VITALS:   - Reviewed    IMAGING/DATA:   - Reviewed     ALLERGIES:   - penicillin (Other)    PHYSICAL EXAM:    General: intubated  CVS: RRR  Pulm: CTAB  GI: Soft, NTND  Extremities: No LE Edema  Neuro: EO to nox, 3mm NR pupils b/l, +cough, +overbreathes, RUE trace finger flickering, RLE wiggles toes spont, LUE no movement, LLE TF, no FC

## 2021-11-08 NOTE — DIETITIAN INITIAL EVALUATION ADULT. - ENTERAL
Recommend increase EN feeds: Glucerna 1.2 at 80ml/hr x 24 hrs. To provide (based on dosing wt 86.8kg): 1920ml, 2304kcal (26.5kcal/kg) and 115g protein (1.3g protein/kg).

## 2021-11-08 NOTE — PROCEDURE NOTE - NSINDICATIONS_GEN_A_CORE
arterial puncture to obtain ABG's/critical patient/monitoring purposes
arterial puncture to obtain ABG's/blood sampling/cannulation purposes/critical patient/monitoring purposes

## 2021-11-08 NOTE — PROGRESS NOTE ADULT - ASSESSMENT
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 active hemorrhage, possibly HTNive hemorrhage given location and presented with SBP>200s.  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, untreated  CTH today- as above   - neuro checks q1  - EVD@15, monitor output, drain care, ICP<22  - pain control  - lipitor    CVS:  Afib RVR on admission  - SBP goal 100-140  - cardene PRN to goal  - TTE  - labetalol 100mg q8h    PULM: resp failure requiring mech vent  Intubated for airway protection  - CPAP trials   - vent bundle  - chest PT  - ABG daily, titrate vent settings  - PS as tolerated    RENAL: Cr worsening will get renal studies urine lytes, monitor, unsure of baseline might be ckd  - Fluids: HTS2%@50  - BMPq8h  - Na goal 145-155  - trend lytes  - daily IOs    GI:  - Diet: TF  - GI prophylaxis: PPI while intubated  - Bowel regimen- last BM - PTA  Senna / Miralax q 12 / Mg Citrate prn  q day     ENDO:   A1c 6.4%  - FS goal 120-180  - MISS  - NPH 8q6    HEME/ONC:  - SCDs  - Chemoppx: unsecured aneurysm    ID:  - monitor for fevers      Patient is at high risk of neurologic deterioration/death due to:  brain herniation, heme expansion, cerebral edema, ICP crisis      Time spent: 35 critical care minutes ASSESSMENT/PLAN:    63M hx of HTN, DM p/w posterior fossa heme c/b IVH status post EVD, intubated for airway protection, 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, untreated    NEURO:  -CTA with hyperdense foci in 4th vent suggestive of aneurysm vs. active hemorrhage, possibly HTNive hemorrhage given location and presented with SBP>200s.  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, untreated  - CTH today-   - neuro checks q1  - EVD@15, monitor output, drain care, ICP<22  - pain control  - lipitor    CVS:  Afib RVR on admission  - SBP goal 100-140  - cardene PRN to goal  - TTE  - labetalol 100mg q8h    PULM: resp failure requiring mech vent  Intubated for airway protection  - CPAP trials   - vent bundle  - chest PT  - ABG daily, titrate vent settings  - PS as tolerated    RENAL: Cr worsening will get renal studies urine lytes, monitor, unsure of baseline might be ckd  - Fluids: HTS2%@50  - BMPq8h  - Na goal 145-155  - trend lytes  - daily IOs    GI:  - Diet: TF  - GI prophylaxis: PPI while intubated  - Bowel regimen- last BM - PTA  Senna / Miralax q 12 / Mg Citrate prn  q day     ENDO:   A1c 6.4%  - FS goal 120-180  - MISS  - NPH 8q6    HEME/ONC:  - SCDs  - Chemoppx: unsecured aneurysm    ID:  - monitor for fevers      Patient is at high risk of neurologic deterioration/death due to:  brain herniation, heme expansion, cerebral edema, ICP crisis      Time spent: 35 critical care minutes ASSESSMENT/PLAN:    63M hx of HTN, DM p/w posterior fossa heme c/b IVH status post EVD, intubated for airway protection, 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, untreated    NEURO:  -CTA with hyperdense foci in 4th vent suggestive of aneurysm vs. active hemorrhage, possibly HTNive hemorrhage given location and presented with SBP>200s.  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, untreated  - CTH today   - neuro checks q1  - EVD@15, monitor output, drain care, ICP<22  - preop angio tomorrow  - pain control  - lipitor    CVS:  Afib RVR on admission  - SBP goal 100-140  - cardene PRN to goal  - TTE  - labetalol 200mg q8h  - hydral 50q8    PULM: resp failure requiring mech vent  Intubated for airway protection  - CPAP trials   - vent bundle  - chest PT    RENAL: Cr worsening will get renal studies urine lytes, monitor, unsure of baseline might be ckd  - Fluids: IVL  - trend lytes  - daily IOs    GI:  - Diet: TF, NPO MN for anfio  - GI prophylaxis: PPI while intubated  - Bowel regimen- last BM - PTA  - Senna / Miralax q 12 / Mg Citrate prn  q day     ENDO:   A1c 6.4%  - FS goal 120-180  - MISS  - NPH 8q6    HEME/ONC:  - SCDs  - Chemoppx: unsecured aneurysm    ID:  - monitor for fevers      Patient is at high risk of neurologic deterioration/death due to:  brain herniation, heme expansion, cerebral edema, ICP crisis      Time spent: 35 critical care minutes ASSESSMENT/PLAN:    63M hx of HTN, DM p/w posterior fossa heme c/b IVH status post EVD, intubated for airway protection, 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, untreated    NEURO:  -CTA with hyperdense foci in 4th vent suggestive of aneurysm vs. active hemorrhage, possibly HTNive hemorrhage given location and presented with SBP>200s.  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, untreated  - CTH today   - neuro checks q1  - EVD@15, monitor output, drain care, ICP<22  - preop angio tomorrow  - pain control  - lipitor    CVS:  Afib RVR on admission  - SBP goal 100-140  - cardene PRN to goal  - TTE  - labetalol 200mg q8h  - hydral 50q8    PULM: resp failure requiring mech vent  Intubated for airway protection  - CPAP trials   - vent bundle  - chest PT    RENAL: Cr worsening will get renal studies urine lytes, monitor, unsure of baseline might be ckd  - Fluids: IVL  - trend lytes  - daily IOs    GI:  - Diet: TF, NPO MN for anfio  - GI prophylaxis: PPI while intubated  - Bowel regimen- last BM - PTA  - Senna / Miralax q 12 / Mg Citrate prn  q day     ENDO:   A1c 6.4%  - FS goal 120-180  - MISS  - NPH 4q6 while NPO    HEME/ONC:  - SCDs  - Chemoppx: unsecured aneurysm    ID:  - monitor for fevers      Patient is at high risk of neurologic deterioration/death due to:  brain herniation, heme expansion, cerebral edema, ICP crisis      Time spent: 35 critical care minutes

## 2021-11-08 NOTE — PROGRESS NOTE ADULT - ASSESSMENT
ASSESSMENT/PLAN:    63M hx of HTN, DM p/w posterior fossa heme c/b IVH status post EVD, intubated for airway protection, 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/5) 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, untreated    NEURO:  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/5), AV fistula and related aneurysm untreated  - neuro checks q1  CTH w new L cerebellar hypodensity  Plan for angio next week  - EVD@15, monitor output--minimal, but patent, drain care, ICP<22  - pain control    CVS:  Afib RVR, HTN  - SBP goal 100-140  - cardene PRN to goal  - TTE  - labetalol 100mg q8h and hydralazine 25mg tid-->increase to 50tid, still on nicardipine gtt    PULM: resp failure requiring mech vent  Intubated for airway protection  - ETT to vent  - vent bundle  - chest PT  - ABG daily, titrate vent settings  - PS as tolerated    RENAL:  MICHELLE stable now  - Fluids: IVL now Na above goal  - BMP q8  - Na goal 145-155  - trend lytes  - daily IOs    GI:  - Diet: TF  - GI prophylaxis: PPI while intubated  - Bowel regimen    ENDO: DM, hyperglycemia  A1c 6.4%  - FS goal 120-180  - MISS  - NPH 4q6-->increased to 8U q6    HEME/ONC:  - SCDs  - Chemoppx: unsecured ruptured aneurysm  f/u BLE dopplers to r/o DVTs; high risk as Patient immobile     ID:  - monitor for fevers    Patient is at high risk of neurologic deterioration/death due to:  brain herniation, heme expansion, cerebral edema, ICP crisis, DVT/PE     ASSESSMENT/PLAN:    63M hx of HTN, DM p/w posterior fossa heme c/b IVH status post EVD, intubated for airway protection, 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/5) 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, untreated    NEURO:  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/5), AV fistula and related aneurysm untreated  - neuro checks q1  CTH w new L cerebellar hypodensity  pre op for angio in am  - EVD@10, monitor output--minimal, but patent, drain care, ICP<22  - pain control    CVS:  Afib RVR, HTN  - SBP goal 100-140  - cardene PRN to goal  - TTE  - labetalol 100mg q8h and hydralazine 25mg tid-->increase to 50tid; labetalol was increased during the day to 200mg, became hypotensive this evening after getting a dose, fluid bolus and decrease back to 100mg    PULM: resp failure requiring mech vent  Intubated for airway protection  - ETT to vent  - vent bundle  - chest PT  - ABG daily, titrate vent settings  - PS as tolerated    RENAL:  MICHELLE stable now  - Fluids: IVF overnight will get contrast with angiogram  - BMP q8  - Na goal 145-155  - trend lytes  - daily IOs    GI:  - Diet: TF  - GI prophylaxis: PPI while intubated  - Bowel regimen    ENDO: DM, hyperglycemia  A1c 6.4%  - FS goal 120-180  - MISS  - NPH 4q6-->increased to 8U q6    HEME/ONC:  - SCDs  - Chemoppx: unsecured ruptured aneurysm  11/8 BLE dopplers negative    ID:  - monitor for fevers    Patient is at high risk of neurologic deterioration/death due to:  brain herniation, heme expansion, cerebral edema, ICP crisis, DVT/PE

## 2021-11-08 NOTE — DIETITIAN INITIAL EVALUATION ADULT. - PERTINENT MEDS FT
Peridex, Hibiclens, Humulin, Insulin Lispro, Hydralazine, Lipitor, Nicardipine, Protonix, Labetalol, Miralax, Senna

## 2021-11-09 ENCOUNTER — TRANSCRIPTION ENCOUNTER (OUTPATIENT)
Age: 61
End: 2021-11-09

## 2021-11-09 ENCOUNTER — APPOINTMENT (OUTPATIENT)
Dept: NEUROSURGERY | Facility: HOSPITAL | Age: 61
End: 2021-11-09

## 2021-11-09 LAB
ANION GAP SERPL CALC-SCNC: 10 MMOL/L — SIGNIFICANT CHANGE UP (ref 5–17)
ANION GAP SERPL CALC-SCNC: 11 MMOL/L — SIGNIFICANT CHANGE UP (ref 5–17)
ANION GAP SERPL CALC-SCNC: 14 MMOL/L — SIGNIFICANT CHANGE UP (ref 5–17)
APTT BLD: 30.4 SEC — SIGNIFICANT CHANGE UP (ref 27.5–35.5)
BLD GP AB SCN SERPL QL: NEGATIVE — SIGNIFICANT CHANGE UP
BUN SERPL-MCNC: 38 MG/DL — HIGH (ref 7–23)
BUN SERPL-MCNC: 42 MG/DL — HIGH (ref 7–23)
BUN SERPL-MCNC: 47 MG/DL — HIGH (ref 7–23)
CALCIUM SERPL-MCNC: 8.7 MG/DL — SIGNIFICANT CHANGE UP (ref 8.4–10.5)
CALCIUM SERPL-MCNC: 8.7 MG/DL — SIGNIFICANT CHANGE UP (ref 8.4–10.5)
CALCIUM SERPL-MCNC: 9.2 MG/DL — SIGNIFICANT CHANGE UP (ref 8.4–10.5)
CHLORIDE SERPL-SCNC: 119 MMOL/L — HIGH (ref 96–108)
CHLORIDE SERPL-SCNC: 119 MMOL/L — HIGH (ref 96–108)
CHLORIDE SERPL-SCNC: 121 MMOL/L — HIGH (ref 96–108)
CO2 SERPL-SCNC: 23 MMOL/L — SIGNIFICANT CHANGE UP (ref 22–31)
CO2 SERPL-SCNC: 24 MMOL/L — SIGNIFICANT CHANGE UP (ref 22–31)
CO2 SERPL-SCNC: 24 MMOL/L — SIGNIFICANT CHANGE UP (ref 22–31)
CREAT SERPL-MCNC: 1.36 MG/DL — HIGH (ref 0.5–1.3)
CREAT SERPL-MCNC: 1.36 MG/DL — HIGH (ref 0.5–1.3)
CREAT SERPL-MCNC: 1.52 MG/DL — HIGH (ref 0.5–1.3)
GLUCOSE SERPL-MCNC: 169 MG/DL — HIGH (ref 70–99)
GLUCOSE SERPL-MCNC: 187 MG/DL — HIGH (ref 70–99)
GLUCOSE SERPL-MCNC: 188 MG/DL — HIGH (ref 70–99)
HCT VFR BLD CALC: 26.1 % — LOW (ref 39–50)
HGB BLD-MCNC: 8.1 G/DL — LOW (ref 13–17)
INR BLD: 1.01 RATIO — SIGNIFICANT CHANGE UP (ref 0.88–1.16)
MAGNESIUM SERPL-MCNC: 2.2 MG/DL — SIGNIFICANT CHANGE UP (ref 1.6–2.6)
MAGNESIUM SERPL-MCNC: 2.4 MG/DL — SIGNIFICANT CHANGE UP (ref 1.6–2.6)
MCHC RBC-ENTMCNC: 30.1 PG — SIGNIFICANT CHANGE UP (ref 27–34)
MCHC RBC-ENTMCNC: 31 GM/DL — LOW (ref 32–36)
MCV RBC AUTO: 97 FL — SIGNIFICANT CHANGE UP (ref 80–100)
NRBC # BLD: 0 /100 WBCS — SIGNIFICANT CHANGE UP (ref 0–0)
PHOSPHATE SERPL-MCNC: 5.2 MG/DL — HIGH (ref 2.5–4.5)
PHOSPHATE SERPL-MCNC: 5.7 MG/DL — HIGH (ref 2.5–4.5)
PLATELET # BLD AUTO: 206 K/UL — SIGNIFICANT CHANGE UP (ref 150–400)
POTASSIUM SERPL-MCNC: 4 MMOL/L — SIGNIFICANT CHANGE UP (ref 3.5–5.3)
POTASSIUM SERPL-MCNC: 4.1 MMOL/L — SIGNIFICANT CHANGE UP (ref 3.5–5.3)
POTASSIUM SERPL-MCNC: 4.1 MMOL/L — SIGNIFICANT CHANGE UP (ref 3.5–5.3)
POTASSIUM SERPL-SCNC: 4 MMOL/L — SIGNIFICANT CHANGE UP (ref 3.5–5.3)
POTASSIUM SERPL-SCNC: 4.1 MMOL/L — SIGNIFICANT CHANGE UP (ref 3.5–5.3)
POTASSIUM SERPL-SCNC: 4.1 MMOL/L — SIGNIFICANT CHANGE UP (ref 3.5–5.3)
PROTHROM AB SERPL-ACNC: 12.1 SEC — SIGNIFICANT CHANGE UP (ref 10.6–13.6)
RBC # BLD: 2.69 M/UL — LOW (ref 4.2–5.8)
RBC # FLD: 15.6 % — HIGH (ref 10.3–14.5)
RH IG SCN BLD-IMP: POSITIVE — SIGNIFICANT CHANGE UP
SARS-COV-2 RNA SPEC QL NAA+PROBE: SIGNIFICANT CHANGE UP
SODIUM SERPL-SCNC: 153 MMOL/L — HIGH (ref 135–145)
SODIUM SERPL-SCNC: 155 MMOL/L — HIGH (ref 135–145)
SODIUM SERPL-SCNC: 157 MMOL/L — HIGH (ref 135–145)
WBC # BLD: 15.1 K/UL — HIGH (ref 3.8–10.5)
WBC # FLD AUTO: 15.1 K/UL — HIGH (ref 3.8–10.5)

## 2021-11-09 PROCEDURE — 93306 TTE W/DOPPLER COMPLETE: CPT | Mod: 26,59

## 2021-11-09 PROCEDURE — 99291 CRITICAL CARE FIRST HOUR: CPT

## 2021-11-09 PROCEDURE — 36228 PLACE CATH INTRACRANIAL ART: CPT | Mod: 58,LT

## 2021-11-09 PROCEDURE — 76377 3D RENDER W/INTRP POSTPROCES: CPT | Mod: 26

## 2021-11-09 PROCEDURE — 71045 X-RAY EXAM CHEST 1 VIEW: CPT | Mod: 26,59

## 2021-11-09 PROCEDURE — 36226 PLACE CATH VERTEBRAL ART: CPT | Mod: 58,LT

## 2021-11-09 RX ORDER — NICARDIPINE HYDROCHLORIDE 30 MG/1
7 CAPSULE, EXTENDED RELEASE ORAL
Qty: 40 | Refills: 0 | Status: DISCONTINUED | OUTPATIENT
Start: 2021-11-09 | End: 2021-11-10

## 2021-11-09 RX ORDER — NICARDIPINE HYDROCHLORIDE 30 MG/1
5 CAPSULE, EXTENDED RELEASE ORAL
Qty: 40 | Refills: 0 | Status: DISCONTINUED | OUTPATIENT
Start: 2021-11-09 | End: 2021-11-09

## 2021-11-09 RX ORDER — LABETALOL HCL 100 MG
200 TABLET ORAL THREE TIMES A DAY
Refills: 0 | Status: DISCONTINUED | OUTPATIENT
Start: 2021-11-09 | End: 2021-11-13

## 2021-11-09 RX ADMIN — Medication 50 MILLIGRAM(S): at 06:21

## 2021-11-09 RX ADMIN — SODIUM CHLORIDE 75 MILLILITER(S): 9 INJECTION, SOLUTION INTRAVENOUS at 21:52

## 2021-11-09 RX ADMIN — Medication 200 MILLIGRAM(S): at 21:51

## 2021-11-09 RX ADMIN — Medication 200 MILLIGRAM(S): at 15:15

## 2021-11-09 RX ADMIN — SODIUM CHLORIDE 75 MILLILITER(S): 9 INJECTION, SOLUTION INTRAVENOUS at 07:00

## 2021-11-09 RX ADMIN — CHLORHEXIDINE GLUCONATE 15 MILLILITER(S): 213 SOLUTION TOPICAL at 06:20

## 2021-11-09 RX ADMIN — NICARDIPINE HYDROCHLORIDE 35 MG/HR: 30 CAPSULE, EXTENDED RELEASE ORAL at 15:15

## 2021-11-09 RX ADMIN — ATORVASTATIN CALCIUM 40 MILLIGRAM(S): 80 TABLET, FILM COATED ORAL at 21:51

## 2021-11-09 RX ADMIN — Medication 4: at 17:22

## 2021-11-09 RX ADMIN — Medication 4 MILLIGRAM(S): at 06:20

## 2021-11-09 RX ADMIN — SODIUM CHLORIDE 75 MILLILITER(S): 9 INJECTION, SOLUTION INTRAVENOUS at 18:25

## 2021-11-09 RX ADMIN — Medication 4 MILLIGRAM(S): at 15:15

## 2021-11-09 RX ADMIN — Medication 100 MILLIGRAM(S): at 06:21

## 2021-11-09 RX ADMIN — PANTOPRAZOLE SODIUM 40 MILLIGRAM(S): 20 TABLET, DELAYED RELEASE ORAL at 11:33

## 2021-11-09 RX ADMIN — CHLORHEXIDINE GLUCONATE 1 APPLICATION(S): 213 SOLUTION TOPICAL at 22:00

## 2021-11-09 RX ADMIN — Medication 4 MILLIGRAM(S): at 21:51

## 2021-11-09 RX ADMIN — Medication 4: at 05:30

## 2021-11-09 RX ADMIN — CHLORHEXIDINE GLUCONATE 15 MILLILITER(S): 213 SOLUTION TOPICAL at 17:22

## 2021-11-09 NOTE — PROGRESS NOTE ADULT - SUBJECTIVE AND OBJECTIVE BOX
NSCU ATTENDING -- ADDITIONAL PROGRESS NOTE    Nighttime rounds were performed -- please refer to earlier Progress Note for HPI details.    T(C): 37.4 (11-09-21 @ 19:00), Max: 37.4 (11-09-21 @ 19:00)  HR: 97 (11-09-21 @ 22:00) (60 - 97)  BP: --  RR: 22 (11-09-21 @ 22:00) (11 - 24)  SpO2: 99% (11-09-21 @ 22:00) (95% - 100%)  Wt(kg): --    Relevant labwork and imaging reviewed.    Patient remains critically ill.    EVD open @ 10, ICPs wnl, CSF output up to 10 cc/hr.  S/p angio today, NPO after midnight for crani tomorrow.  Will remain intubated.    Additional 30 minutes of critical care time.

## 2021-11-09 NOTE — CHART NOTE - NSCHARTNOTEFT_GEN_A_CORE
Interventional Neuro Radiology  Pre-Procedure Note       This is a 60yo male with pmhx HTN, DM at work complaining of HA started feeling dizzy and vomiting, HTN/keke when EMS got to him. SBP 220s, HR 50s. CTH shows large posterior fossa bleed w/ IVH/SAH/hydro. Patient s/p angio on 11/5, demonstarting left PICA aneurysm. Patient presents now to neuro IR for selective cerebral angiography and embolization.     Neuro Exam: intubated, + corneals, pupils 3 NR, + cough, no FC, R side WD, LUE no mvmt, LLE TF      PAST MEDICAL & SURGICAL HISTORY:  HTN (hypertension)  Diabetes mellitus      Social History:   Denies tobacco use    FAMILY HISTORY:  No pertinent family history    Allergies:   penicillin (Other)      Current Medications:   atorvastatin 40 milliGRAM(s) Oral at bedtime  chlorhexidine 0.12% Liquid 15 milliLiter(s) Oral Mucosa two times a day  chlorhexidine 4% Liquid 1 Application(s) Topical <User Schedule>  dexAMETHasone  Injectable 4 milliGRAM(s) IV Push every 8 hours  dextrose 50% Injectable 25 Gram(s) IV Push once  dextrose 50% Injectable 12.5 Gram(s) IV Push once  dextrose 50% Injectable 25 Gram(s) IV Push once  hydrALAZINE 50 milliGRAM(s) Oral three times a day  influenza   Vaccine 0.5 milliLiter(s) IntraMuscular once  insulin lispro (ADMELOG) corrective regimen sliding scale   SubCutaneous every 6 hours  insulin NPH human recombinant 8 Unit(s) SubCutaneous every 6 hours  labetalol 100 milliGRAM(s) Oral every 8 hours  lactated ringers. 1000 milliLiter(s) IV Continuous <Continuous>  niCARdipine Infusion 5 mG/Hr IV Continuous <Continuous>  pantoprazole  Injectable 40 milliGRAM(s) IV Push daily      Labs:                         8.7    14.18 )-----------( 209      ( 07 Nov 2021 21:32 )             27.8       11-09    153<H>  |  119<H>  |  38<H>  ----------------------------<  188<H>  4.0   |  24  |  1.36<H>    Ca    9.2      09 Nov 2021 04:51  Phos  3.9     11-08  Mg     2.6     11-08    TPro  6.1  /  Alb  3.2<L>  /  TBili  0.5  /  DBili  x   /  AST  23  /  ALT  32  /  AlkPhos  92  11-08      Blood Bank: 11-09-21  O  --  Positive      Assessment/Plan:   This is a 60yo male  presents with IVH/ left PICA aneurysm. Patient presents to neuro-IR for selective cerebral angiography and possible embolization. Procedure/ risks/ benefits/ goals/ alternatives were explained. Risks include but are not limited to stroke/ vessel injury/ hemorrhage/ groin hematoma. All questions answered. Informed content obtained from patient's wife. Consent placed in chart.    Kerry Gonzalez PA-C  [phone]

## 2021-11-09 NOTE — PROGRESS NOTE ADULT - ATTENDING COMMENTS
Patient seen and examined by attending on 11/9/2021.    Patient is critically ill due to ICH with IVH and at high risk for neurological deterioration or death due to: brain compression, hydrocephalus, respiratory failure

## 2021-11-09 NOTE — PROGRESS NOTE ADULT - ASSESSMENT
ASSESSMENT/PLAN:    61M hx of HTN, DM, who was BIBEMS as a code stroke w/ LKW ~0830 am when he c/o HA with n/v, EMS activated found patient to be incoherent, hypertensive 220s and bradycardic 50s and ,eventually unresponsive in ED requiring intubation for GCS/Airway protection, CTH with large posterior fossa bleed w/ IVH, CTA w/ no LVO, large hyperdense focus in 4th vent suggesting active bleed vs. 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 active hemorrhage, possibly HTNive hemorrhage given location and presented with SBP>200s.  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, untreated  - neuro checks q1  - EVD@10, monitor output, drain care, ICP<22  - Dex4q8 for cerebral edema  - angio today  - pain control    CVS:  Afib RVR on admission  - SBP goal 100-140  - cardene PRN to goal  - labetalol 200mg q8h increased today  - d/c hydral 50q8 today    PULM: resp failure requiring mech vent  Intubated for airway protection  - CPAP trials   - vent bundle  - chest PT    RENAL:   Baseline Cr 1.24  - Fluids: 75cc/h LR while PO  - trend lytes  - daily IOs    GI:  - Diet: NPO for angio  - GI prophylaxis: PPI while intubated  - Bowel regimen- rectal tube  - Senna / Miralax q 12 / Mg Citrate prn  q day     ENDO:   A1c 6.4%  - FS goal 120-180  - MISS  - NPH 8q6 while NPO (restart after angio)    HEME/ONC:  - SCDs  - Chemoppx: unsecured aneurysm, start after angio    ID:  - monitor for fevers   60 yo man hx of HTN, DM, admitted 11/4 with HA/N/V followed by unresponsiveness, CT head with posterior fossa hemorrhage c/b severe IVH with casting of the 4th and 3rd ventricles with hydrocephalus, s/p EVD and SOC. 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, untreated  - neuro checks q1  - EVD@10, monitor output, drain care, ICP<22  - Dex4q8 for cerebral edema  - angio today  - pain control    CVS:  Afib RVR on admission  - SBP goal 100-140  - cardene PRN to goal  - labetalol 200mg q8h increased today  - d/c hydral 50q8 today    PULM: resp failure requiring mech vent  Intubated for airway protection  - CPAP trials   - vent bundle  - chest PT    RENAL:   Baseline Cr 1.24  - Fluids: 75cc/h LR while PO  - trend lytes  - daily IOs    GI:  - Diet: NPO for angio  - GI prophylaxis: PPI while intubated  - Bowel regimen- rectal tube  - Senna / Miralax q 12 / Mg Citrate prn  q day     ENDO:   A1c 6.4%  - FS goal 120-180  - MISS  - NPH 8q6 while NPO (restart after angio)    HEME/ONC:  - SCDs  - Chemoppx: unsecured aneurysm, start after angio    ID:  - monitor for fevers

## 2021-11-09 NOTE — PROGRESS NOTE ADULT - SUBJECTIVE AND OBJECTIVE BOX
Patient seen and examined at bedside.    --Anticoagulation--    T(C): 37 (11-09-21 @ 03:00), Max: 37.4 (11-08-21 @ 15:00)  HR: 69 (11-09-21 @ 04:00) (60 - 92)  BP: --  RR: 16 (11-09-21 @ 04:00) (14 - 24)  SpO2: 100% (11-09-21 @ 04:00) (90% - 100%)  Wt(kg): --    Exam:    intubated, + corneals, pupils 3 NR, + cough, no FC, R side WD, LUE no mvmt, LLE TF

## 2021-11-09 NOTE — PROGRESS NOTE ADULT - SUBJECTIVE AND OBJECTIVE BOX
NSCU Progress Note    Assessment/Hospital Course:      11/4: Admitted. intubated for GCS/Airway protection. EVD placed in ED POD0 SOC  11/5: s/p diagnostic angio L PICA aneurysm, untreated  11/9: worsening exam, started on HTS,     24 Hour Events/Subjective:  - EVD@10 with minimal output  - HTS 2%  - TCD pending        REVIEW OF SYSTEMS:  - unable to obtain given mental status    VITALS:   T(C): 36.6 (11-09-21 @ 11:00), Max: 37.4 (11-08-21 @ 15:00)  T(F): 97.9 (11-09-21 @ 11:00), Max: 99.3 (11-08-21 @ 15:00)  HR: 69 (11-09-21 @ 12:00) (60 - 87)  BP: --  ABP: 128/58 (11-09-21 @ 12:00) (93/43 - 159/74)  ABP(mean): 80 (11-09-21 @ 12:00) (58 - 102)  RR: 22 (11-09-21 @ 12:00) (13 - 24)  SpO2: 99% (11-09-21 @ 12:00) (90% - 100%)                          8.7    14.18 )-----------( 209      ( 07 Nov 2021 21:32 )             27.8     11-09    153<H>  |  119<H>  |  38<H>  ----------------------------<  188<H>  4.0   |  24  |  1.36<H>    Ca    9.2      09 Nov 2021 04:51  Phos  3.9     11-08  Mg     2.6     11-08    TPro  6.1  /  Alb  3.2<L>  /  TBili  0.5  /  DBili  x   /  AST  23  /  ALT  32  /  AlkPhos  92  11-08        PHYSICAL EXAM:    General: intubated  CVS: RRR  Pulm: CTAB  GI: Soft, NTND  Extremities: No LE Edema  Neuro: slight EO to central nox, +cough/gag ,+overbreathes, biting tube, uppers 0/5, BLE spont wiggles toes and briskly withdraws   NSCU Progress Note    Assessment/Hospital Course:  11/4: Admitted. intubated for GCS/Airway protection. EVD placed in ED POD0 SOC  11/5: s/p diagnostic angio L PICA aneurysm, untreated  11/9: worsening exam, started on HTS,     24 Hour Events/Subjective:  - Hemovac d/c'ed the day prior which was putting out CSF  - EVD@10 with 50cc output in 24 hours   - HTS 2%  - pending angiogram         REVIEW OF SYSTEMS:  - unable to obtain given mental status    VITALS:   T(C): 36.6 (11-09-21 @ 11:00), Max: 37.4 (11-08-21 @ 15:00)  T(F): 97.9 (11-09-21 @ 11:00), Max: 99.3 (11-08-21 @ 15:00)  HR: 69 (11-09-21 @ 12:00) (60 - 87)  BP: --  ABP: 128/58 (11-09-21 @ 12:00) (93/43 - 159/74)  ABP(mean): 80 (11-09-21 @ 12:00) (58 - 102)  RR: 22 (11-09-21 @ 12:00) (13 - 24)  SpO2: 99% (11-09-21 @ 12:00) (90% - 100%)                          8.7    14.18 )-----------( 209      ( 07 Nov 2021 21:32 )             27.8     11-09    153<H>  |  119<H>  |  38<H>  ----------------------------<  188<H>  4.0   |  24  |  1.36<H>    Ca    9.2      09 Nov 2021 04:51  Phos  3.9     11-08  Mg     2.6     11-08    TPro  6.1  /  Alb  3.2<L>  /  TBili  0.5  /  DBili  x   /  AST  23  /  ALT  32  /  AlkPhos  92  11-08        PHYSICAL EXAM:    General: intubated  CVS: RRR  Pulm: CTAB  GI: Soft, NTND  Extremities: No LE Edema  Neuro: slight EO to central nox, +cough/gag ,+overbreathes, biting tube, uppers 0/5, BLE spont wiggles toes and briskly withdraws   NSCU Progress Note    Assessment/Hospital Course:  11/4: Admitted. intubated for GCS/Airway protection. EVD placed in ED POD0 SOC  11/5: s/p diagnostic angio L PICA aneurysm, untreated  11/9: worsening exam, started on HTS    24 Hour Events/Subjective:  - Hemovac d/c'ed the day prior which was putting out CSF  - EVD@10 with 50cc output in 24 hours   - HTS 2%  - pending angiogram       REVIEW OF SYSTEMS:  - unable to obtain given mental status    VITALS:   T(C): 36.6 (11-09-21 @ 11:00), Max: 37.4 (11-08-21 @ 15:00)  T(F): 97.9 (11-09-21 @ 11:00), Max: 99.3 (11-08-21 @ 15:00)  HR: 69 (11-09-21 @ 12:00) (60 - 87)  BP: --  ABP: 128/58 (11-09-21 @ 12:00) (93/43 - 159/74)  ABP(mean): 80 (11-09-21 @ 12:00) (58 - 102)  RR: 22 (11-09-21 @ 12:00) (13 - 24)  SpO2: 99% (11-09-21 @ 12:00) (90% - 100%)                          8.7    14.18 )-----------( 209      ( 07 Nov 2021 21:32 )             27.8     11-09    153<H>  |  119<H>  |  38<H>  ----------------------------<  188<H>  4.0   |  24  |  1.36<H>    Ca    9.2      09 Nov 2021 04:51  Phos  3.9     11-08  Mg     2.6     11-08    TPro  6.1  /  Alb  3.2<L>  /  TBili  0.5  /  DBili  x   /  AST  23  /  ALT  32  /  AlkPhos  92  11-08        PHYSICAL EXAM:    General: intubated  CVS: RRR  Pulm: CTAB  GI: Soft, NTND  Extremities: No LE Edema  Neuro: slight EO to central nox, +cough/gag ,+overbreathes, biting tube, uppers 0/5, BLE spont wiggles toes and briskly withdraws

## 2021-11-09 NOTE — PROGRESS NOTE ADULT - ASSESSMENT
ANGLE ALICIA  61M pmhx HTN, DM at work complaining of HA ~8:30, starting feeling dizzy and vomiting, HTN/keke when EMS got to him. SBP 220s, HR 50s. CTH shows large posterior fossa bleed w/ IVH/SAH/hydro.   -Adm NSCU, q1h neuro check  -EVD in ED, drainage at 15  -exam improving  -angio in AM

## 2021-11-09 NOTE — CHART NOTE - NSCHARTNOTEFT_GEN_A_CORE
Interventional Neuro- Radiology   Procedure Note      Procedure: Selective Cerebral Angiography   Pre- Procedure Diagnosis: Left PICA aneurysm   Post- Procedure Diagnosis:    : Dr. Camilo MD  Fellow: Dr. Martinez  NP: Che Canela     RN:  Tech:    Anesthesia: Dr. Nails   (general anesthesia)    I/Os:  Fluids:  Barney:  Contrast:  Estimated Blood Loss: <10cc    Preliminary Report:  Under general anesthesia, using a 5Fr sheath to the right groin examination of left vertebral artery via selective cerebral angiography demonstrates ________. ( Official note to follow).    Patient tolerated procedure well, vital signs stable, hemodynamically stable, no change in neurological status compared to baseline. Results discussed with neurosurgery/ patient's family. Groin sheath d/c'ed, manual compression held to hemostasis, no active bleeding, no hematoma, Vascade applied, quick clot and safeguard balloon dressing applied at _____h. Patient transferred to NSCU for further care/ monitoring. 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 Camilo MD  Fellow: Dr. Martinez  NP: Che Canela     RN:  Tech:    Anesthesia: Dr. Nails   (general anesthesia)    I/Os:  Fluids: 350cc  Barney: DTV   Contrast: 66cc   EVD: 0cc   Estimated Blood Loss: <10cc    Preliminary Report:  Under general anesthesia, using a 5Fr sheath to the right groin examination of left vertebral artery via selective cerebral angiography demonstrates small fistula off the branch of the left PICA. ( Official note to follow).    Patient tolerated procedure well, vital signs stable, hemodynamically stable, no change in neurological status compared to baseline. Results discussed with neurosurgery/ patient's family. Groin sheath d/c'ed, manual compression held to hemostasis, no active bleeding, no hematoma, Vascade applied, quick clot and safeguard balloon dressing applied at 1415h. Patient transferred to NSCU for further care/ monitoring, plan for OR tomorrow with Dr. Page.     Kerry Gonzalez PA-C  [phone]

## 2021-11-09 NOTE — SPEECH LANGUAGE PATHOLOGY EVALUATION - COMMENTS
Per Neuro: CTA with hyperdense foci in 4th vent suggestive of aneurysm vs. active hemorrhage, possibly HTNive hemorrhage given location and presented with SBP>200s.  11/4: Admitted. intubated for GCS/Airway protection. EVD placed in ED, s/p SOC  11/5: s/p diagnostic angio L PICA aneurysm, untreated  11/9: worsening exam, started on HTS, - EVD@10 with minimal output

## 2021-11-09 NOTE — SPEECH LANGUAGE PATHOLOGY EVALUATION - H & P REVIEW
yes 61M hx HTN, DM, cardiac stent on ASA. At work complaining of HA ~8:30, starting feeling dizzy and vomiting, HTN/keke when EMS got to him. SBP 220s, HR 50s. On EMS arrival at 09:39AM 11/4/21, patient seen talking a little, garbling, moving all extremities, then quickly became unresponsive. No known blood thinners. CTH shows large posterior fossa bleed w/ IVH/SAH/hydro. Exam: Pre/intubation exam: no eyes open, pupils 2b/l, + corneals/cough/gag, not FC, LUE spont fingers moving, flacid otherwise./yes

## 2021-11-10 ENCOUNTER — APPOINTMENT (OUTPATIENT)
Dept: NEUROSURGERY | Facility: HOSPITAL | Age: 61
End: 2021-11-10
Payer: COMMERCIAL

## 2021-11-10 ENCOUNTER — TRANSCRIPTION ENCOUNTER (OUTPATIENT)
Age: 61
End: 2021-11-10

## 2021-11-10 ENCOUNTER — RESULT REVIEW (OUTPATIENT)
Age: 61
End: 2021-11-10

## 2021-11-10 LAB
APPEARANCE CSF: ABNORMAL
APPEARANCE UR: CLEAR — SIGNIFICANT CHANGE UP
BACTERIA # UR AUTO: NEGATIVE — SIGNIFICANT CHANGE UP
BILIRUB UR-MCNC: NEGATIVE — SIGNIFICANT CHANGE UP
COLLECT DURATION TIME UR: 24 HR — SIGNIFICANT CHANGE UP
COLOR CSF: ABNORMAL
COLOR SPEC: SIGNIFICANT CHANGE UP
DIFF PNL FLD: NEGATIVE — SIGNIFICANT CHANGE UP
EOSINOPHIL # CSF: 2 % — SIGNIFICANT CHANGE UP
EPI CELLS # UR: 0 /HPF — SIGNIFICANT CHANGE UP
GLUCOSE CSF-MCNC: 152 MG/DL — HIGH (ref 40–70)
GLUCOSE UR QL: NEGATIVE — SIGNIFICANT CHANGE UP
GRAM STN FLD: SIGNIFICANT CHANGE UP
GRAM STN FLD: SIGNIFICANT CHANGE UP
HCT VFR BLD CALC: 25.9 % — LOW (ref 39–50)
HGB BLD-MCNC: 7.9 G/DL — LOW (ref 13–17)
HYALINE CASTS # UR AUTO: 1 /LPF — SIGNIFICANT CHANGE UP (ref 0–2)
KETONES UR-MCNC: NEGATIVE — SIGNIFICANT CHANGE UP
LEUKOCYTE ESTERASE UR-ACNC: NEGATIVE — SIGNIFICANT CHANGE UP
LYMPHOCYTES # CSF: 61 % — SIGNIFICANT CHANGE UP (ref 40–80)
MCHC RBC-ENTMCNC: 29.9 PG — SIGNIFICANT CHANGE UP (ref 27–34)
MCHC RBC-ENTMCNC: 30.5 GM/DL — LOW (ref 32–36)
MCV RBC AUTO: 98.1 FL — SIGNIFICANT CHANGE UP (ref 80–100)
MONOS+MACROS NFR CSF: 2 % — LOW (ref 15–45)
NEUTROPHILS # CSF: 35 % — HIGH (ref 0–6)
NITRITE UR-MCNC: NEGATIVE — SIGNIFICANT CHANGE UP
NRBC # BLD: 0 /100 WBCS — SIGNIFICANT CHANGE UP (ref 0–0)
NRBC NFR CSF: 2 /UL — SIGNIFICANT CHANGE UP (ref 0–5)
PH UR: 6 — SIGNIFICANT CHANGE UP (ref 5–8)
PLATELET # BLD AUTO: 222 K/UL — SIGNIFICANT CHANGE UP (ref 150–400)
PROT CSF-MCNC: 21 MG/DL — SIGNIFICANT CHANGE UP (ref 15–45)
PROT UR-MCNC: ABNORMAL
RBC # BLD: 2.64 M/UL — LOW (ref 4.2–5.8)
RBC # CSF: 2700 /UL — HIGH (ref 0–0)
RBC # FLD: 15.4 % — HIGH (ref 10.3–14.5)
RBC CASTS # UR COMP ASSIST: 5 /HPF — HIGH (ref 0–4)
SP GR SPEC: 1.03 — HIGH (ref 1.01–1.02)
SPECIMEN SOURCE: SIGNIFICANT CHANGE UP
SPECIMEN SOURCE: SIGNIFICANT CHANGE UP
TOTAL VOLUME - 24 HOUR: 1600 ML — SIGNIFICANT CHANGE UP
TUBE TYPE: SIGNIFICANT CHANGE UP
URINE CREATININE CALCULATION: 1.3 G/24 H — SIGNIFICANT CHANGE UP (ref 1–2)
UROBILINOGEN FLD QL: NEGATIVE — SIGNIFICANT CHANGE UP
WBC # BLD: 15.92 K/UL — HIGH (ref 3.8–10.5)
WBC # FLD AUTO: 15.92 K/UL — HIGH (ref 3.8–10.5)
WBC UR QL: 4 /HPF — SIGNIFICANT CHANGE UP (ref 0–5)

## 2021-11-10 PROCEDURE — 71045 X-RAY EXAM CHEST 1 VIEW: CPT | Mod: 26,59

## 2021-11-10 PROCEDURE — 88304 TISSUE EXAM BY PATHOLOGIST: CPT | Mod: 26

## 2021-11-10 PROCEDURE — 61781 SCAN PROC CRANIAL INTRA: CPT | Mod: 58

## 2021-11-10 PROCEDURE — 99291 CRITICAL CARE FIRST HOUR: CPT

## 2021-11-10 PROCEDURE — 61684 INTRACRANIAL VESSEL SURGERY: CPT | Mod: 58

## 2021-11-10 PROCEDURE — 70450 CT HEAD/BRAIN W/O DYE: CPT | Mod: 26

## 2021-11-10 PROCEDURE — 69990 MICROSURGERY ADD-ON: CPT | Mod: 58,59

## 2021-11-10 RX ORDER — CEFAZOLIN SODIUM 1 G
1000 VIAL (EA) INJECTION EVERY 8 HOURS
Refills: 0 | Status: COMPLETED | OUTPATIENT
Start: 2021-11-10 | End: 2021-11-11

## 2021-11-10 RX ORDER — NICARDIPINE HYDROCHLORIDE 30 MG/1
10 CAPSULE, EXTENDED RELEASE ORAL
Qty: 40 | Refills: 0 | Status: DISCONTINUED | OUTPATIENT
Start: 2021-11-10 | End: 2021-11-11

## 2021-11-10 RX ORDER — ACETAMINOPHEN 500 MG
650 TABLET ORAL EVERY 6 HOURS
Refills: 0 | Status: DISCONTINUED | OUTPATIENT
Start: 2021-11-10 | End: 2021-11-22

## 2021-11-10 RX ADMIN — Medication 650 MILLIGRAM(S): at 01:03

## 2021-11-10 RX ADMIN — ATORVASTATIN CALCIUM 40 MILLIGRAM(S): 80 TABLET, FILM COATED ORAL at 21:20

## 2021-11-10 RX ADMIN — Medication 100 MILLIGRAM(S): at 21:30

## 2021-11-10 RX ADMIN — Medication 200 MILLIGRAM(S): at 21:19

## 2021-11-10 RX ADMIN — Medication 200 MILLIGRAM(S): at 13:04

## 2021-11-10 RX ADMIN — CHLORHEXIDINE GLUCONATE 15 MILLILITER(S): 213 SOLUTION TOPICAL at 06:28

## 2021-11-10 RX ADMIN — Medication 8: at 06:32

## 2021-11-10 RX ADMIN — Medication 12: at 00:14

## 2021-11-10 RX ADMIN — Medication 200 MILLIGRAM(S): at 06:36

## 2021-11-10 RX ADMIN — CHLORHEXIDINE GLUCONATE 1 APPLICATION(S): 213 SOLUTION TOPICAL at 21:19

## 2021-11-10 RX ADMIN — PANTOPRAZOLE SODIUM 40 MILLIGRAM(S): 20 TABLET, DELAYED RELEASE ORAL at 11:53

## 2021-11-10 RX ADMIN — Medication 4 MILLIGRAM(S): at 21:19

## 2021-11-10 RX ADMIN — Medication 8: at 11:53

## 2021-11-10 RX ADMIN — Medication 4 MILLIGRAM(S): at 06:36

## 2021-11-10 RX ADMIN — Medication 8: at 18:31

## 2021-11-10 RX ADMIN — SODIUM CHLORIDE 75 MILLILITER(S): 9 INJECTION, SOLUTION INTRAVENOUS at 13:04

## 2021-11-10 RX ADMIN — CHLORHEXIDINE GLUCONATE 15 MILLILITER(S): 213 SOLUTION TOPICAL at 18:06

## 2021-11-10 RX ADMIN — Medication 4 MILLIGRAM(S): at 13:04

## 2021-11-10 RX ADMIN — Medication 650 MILLIGRAM(S): at 02:00

## 2021-11-10 NOTE — PROGRESS NOTE ADULT - ASSESSMENT
62 yo man hx of HTN, DM, admitted 11/4 with HA/N/V followed by unresponsiveness, CT head with posterior fossa hemorrhage c/b severe IVH with casting of the 4th and 3rd ventricles with hydrocephalus, s/p EVD and SOC. 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)  - neuro checks q1  - EVD@10, monitor output, drain care, ICP<22  - Dex4q8 for cerebral edema  - OR today for fistula rx  - pain control    CVS:  Afib RVR on admission  - SBP goal 100-140  - cardene PRN to goal  - labetalol 200mg q8h       PULM: resp failure requiring mech vent  Intubated for airway protection  - CPAP trials   - vent bundle  - chest PT    RENAL:   Baseline Cr 1.24  - Fluids: 75cc/h LR while NPO  - trend lytes  - daily IOs    GI:  - Diet: NPO for OR  - GI prophylaxis: PPI while intubated  - Bowel regimen- rectal tube  - Senna / Miralax q 12 / Mg Citrate prn  q day     ENDO:   A1c 6.4%  - FS goal 120-180  - MISS  - NPH 8q6 while NPO (restart after OR)    HEME/ONC:  - SCDs  - Chemoppx: unsecured aneurysm, start after OR    ID:  - monitor for fevers   60 yo man hx of HTN, DM, admitted 11/4 with HA/N/V followed by unresponsiveness, CT head with posterior fossa hemorrhage c/b severe IVH with casting of the 4th and 3rd ventricles with hydrocephalus, s/p EVD and SOC. 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)  - neuro checks q1  - EVD@10, monitor output, drain care, ICP<22  - Dex4q8 for cerebral edema  - OR today for fistula rx  - pain control    CVS:  Afib RVR on admission  - SBP goal 100-140  - cardene PRN to goal  - labetalol 200mg q8h       PULM: resp failure requiring mech vent  Intubated for airway protection  - CPAP trials   - vent bundle  - chest PT    RENAL:   Baseline Cr 1.24  - Fluids: 75cc/h LR while NPO  - trend lytes  - daily IOs  - FWB 300q6h    GI:  - Diet: NPO for OR  - GI prophylaxis: PPI while intubated  - Bowel regimen- rectal tube  - Senna / Miralax q 12 / Mg Citrate prn  q day     ENDO:   A1c 6.4%  - FS goal 120-180  - MISS  - NPH 8q6 while NPO (restart after OR)    HEME/ONC:  LED negative 11/8  - SCDs  - Chemoppx: unsecured aneurysm, start after OR    ID:  - monitor for fevers  - f/u cultures from fever 11/10 overnight   62 yo man hx of HTN, DM, admitted 11/4 with HA/N/V followed by unresponsiveness, CT head with posterior fossa hemorrhage c/b severe IVH with casting of the 4th and 3rd ventricles with hydrocephalus, s/p EVD and SOC. 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, untreated  - neuro checks q1  - EVD@10, monitor output, drain care, ICP<22  - Dex4q8 for cerebral edema  - OR today for aneurysm obliteration  - pain control    CVS:  Afib RVR on admission  - SBP goal 100-140  - cardene PRN to goal  - labetalol 200mg q8h       PULM: resp failure requiring mech vent  Intubated for airway protection  - CPAP trials as tolerated   - vent bundle  - chest PT    RENAL:   Baseline Cr 1.24  - Fluids: 75cc/h LR while NPO  - trend lytes  - daily IOs  - FWB 300q6h    GI:  - Diet: NPO for OR  - GI prophylaxis: PPI while intubated  - Bowel regimen- rectal tube  - Senna / Miralax q 12 / Mg Citrate prn  q day     ENDO:   A1c 6.4%  - FS goal 120-180  - MISS  - NPH 8q6 while NPO (restart after OR)    HEME/ONC:  LED negative 11/8  - SCDs  - Chemoppx: unsecured aneurysm, start after OR    ID:  - monitor for fevers  - f/u cultures from fever 11/10 overnight

## 2021-11-10 NOTE — PROGRESS NOTE ADULT - ASSESSMENT
ANGLE ALICIA  61M pmhx HTN, DM at work complaining of HA ~8:30, starting feeling dizzy and vomiting, HTN/keke when EMS got to him. SBP 220s, HR 50s. CTH shows large posterior fossa bleed w/ IVH/SAH/hydro.   -Adm NSCU, q1h neuro check  -EVD in ED, drainage at 15  -Preop for OR in AM

## 2021-11-10 NOTE — PRE-ANESTHESIA EVALUATION ADULT - NSPROPOSEDPROCEDFT_GEN_ALL_CORE
cerebral angiogram and embolization
Craniotomy for clipping of AVM
Cerebral angiogram, possible coiling

## 2021-11-10 NOTE — PROGRESS NOTE ADULT - SUBJECTIVE AND OBJECTIVE BOX
Patient seen and examined at bedside.    --Anticoagulation--    T(C): 38.3 (11-10-21 @ 00:00), Max: 38.3 (11-10-21 @ 00:00)  HR: 87 (11-10-21 @ 01:00) (60 - 97)  BP: --  RR: 22 (11-10-21 @ 01:00) (11 - 23)  SpO2: 99% (11-10-21 @ 01:00) (95% - 100%)  Wt(kg): --    Exam:    intubated, + corneals, pupils 3 NR, + cough, no FC, R side WD, LUE no mvmt, LLE TF

## 2021-11-10 NOTE — DISCHARGE NOTE NURSING/CASE MANAGEMENT/SOCIAL WORK - PATIENT PORTAL LINK FT
You can access the FollowMyHealth Patient Portal offered by [facility] by registering at the following website: [email or web address]. By joining [facility]’s FollowMyHealth portal, you will also be able to view your health information using other applications (apps) compatible with our system.

## 2021-11-10 NOTE — PROGRESS NOTE ADULT - ATTENDING COMMENTS
Patient seen and examined by attending on 11/10/2021.    Patient is critically ill due to ICH 2/2 aneurysm rupture and at high risk for neurological deterioration or death due to: hydrocephalus, brainstem compression, respiratory failure requiring intubation

## 2021-11-11 LAB
ANION GAP SERPL CALC-SCNC: 10 MMOL/L — SIGNIFICANT CHANGE UP (ref 5–17)
ANION GAP SERPL CALC-SCNC: 11 MMOL/L — SIGNIFICANT CHANGE UP (ref 5–17)
ANION GAP SERPL CALC-SCNC: 11 MMOL/L — SIGNIFICANT CHANGE UP (ref 5–17)
ANION GAP SERPL CALC-SCNC: 12 MMOL/L — SIGNIFICANT CHANGE UP (ref 5–17)
ANION GAP SERPL CALC-SCNC: 12 MMOL/L — SIGNIFICANT CHANGE UP (ref 5–17)
BUN SERPL-MCNC: 54 MG/DL — HIGH (ref 7–23)
BUN SERPL-MCNC: 54 MG/DL — HIGH (ref 7–23)
BUN SERPL-MCNC: 56 MG/DL — HIGH (ref 7–23)
BUN SERPL-MCNC: 56 MG/DL — HIGH (ref 7–23)
BUN SERPL-MCNC: 58 MG/DL — HIGH (ref 7–23)
CALCIUM SERPL-MCNC: 8 MG/DL — LOW (ref 8.4–10.5)
CALCIUM SERPL-MCNC: 8.2 MG/DL — LOW (ref 8.4–10.5)
CALCIUM SERPL-MCNC: 8.4 MG/DL — SIGNIFICANT CHANGE UP (ref 8.4–10.5)
CALCIUM SERPL-MCNC: 8.4 MG/DL — SIGNIFICANT CHANGE UP (ref 8.4–10.5)
CALCIUM SERPL-MCNC: 8.7 MG/DL — SIGNIFICANT CHANGE UP (ref 8.4–10.5)
CHLORIDE SERPL-SCNC: 114 MMOL/L — HIGH (ref 96–108)
CHLORIDE SERPL-SCNC: 117 MMOL/L — HIGH (ref 96–108)
CO2 SERPL-SCNC: 22 MMOL/L — SIGNIFICANT CHANGE UP (ref 22–31)
CO2 SERPL-SCNC: 22 MMOL/L — SIGNIFICANT CHANGE UP (ref 22–31)
CO2 SERPL-SCNC: 23 MMOL/L — SIGNIFICANT CHANGE UP (ref 22–31)
CO2 SERPL-SCNC: 23 MMOL/L — SIGNIFICANT CHANGE UP (ref 22–31)
CO2 SERPL-SCNC: 24 MMOL/L — SIGNIFICANT CHANGE UP (ref 22–31)
CREAT SERPL-MCNC: 1.47 MG/DL — HIGH (ref 0.5–1.3)
CREAT SERPL-MCNC: 1.48 MG/DL — HIGH (ref 0.5–1.3)
CREAT SERPL-MCNC: 1.53 MG/DL — HIGH (ref 0.5–1.3)
CREAT SERPL-MCNC: 1.56 MG/DL — HIGH (ref 0.5–1.3)
CREAT SERPL-MCNC: 1.66 MG/DL — HIGH (ref 0.5–1.3)
GLUCOSE SERPL-MCNC: 236 MG/DL — HIGH (ref 70–99)
GLUCOSE SERPL-MCNC: 245 MG/DL — HIGH (ref 70–99)
GLUCOSE SERPL-MCNC: 252 MG/DL — HIGH (ref 70–99)
GLUCOSE SERPL-MCNC: 279 MG/DL — HIGH (ref 70–99)
GLUCOSE SERPL-MCNC: 280 MG/DL — HIGH (ref 70–99)
HCT VFR BLD CALC: 24.4 % — LOW (ref 39–50)
HGB BLD-MCNC: 7.8 G/DL — LOW (ref 13–17)
MAGNESIUM SERPL-MCNC: 2.6 MG/DL — SIGNIFICANT CHANGE UP (ref 1.6–2.6)
MAGNESIUM SERPL-MCNC: 2.7 MG/DL — HIGH (ref 1.6–2.6)
MAGNESIUM SERPL-MCNC: 2.8 MG/DL — HIGH (ref 1.6–2.6)
MCHC RBC-ENTMCNC: 30.1 PG — SIGNIFICANT CHANGE UP (ref 27–34)
MCHC RBC-ENTMCNC: 32 GM/DL — SIGNIFICANT CHANGE UP (ref 32–36)
MCV RBC AUTO: 94.2 FL — SIGNIFICANT CHANGE UP (ref 80–100)
NRBC # BLD: 0 /100 WBCS — SIGNIFICANT CHANGE UP (ref 0–0)
PHOSPHATE SERPL-MCNC: 3.7 MG/DL — SIGNIFICANT CHANGE UP (ref 2.5–4.5)
PHOSPHATE SERPL-MCNC: 3.8 MG/DL — SIGNIFICANT CHANGE UP (ref 2.5–4.5)
PHOSPHATE SERPL-MCNC: 4.4 MG/DL — SIGNIFICANT CHANGE UP (ref 2.5–4.5)
PLATELET # BLD AUTO: 249 K/UL — SIGNIFICANT CHANGE UP (ref 150–400)
POTASSIUM SERPL-MCNC: 4.3 MMOL/L — SIGNIFICANT CHANGE UP (ref 3.5–5.3)
POTASSIUM SERPL-MCNC: 4.3 MMOL/L — SIGNIFICANT CHANGE UP (ref 3.5–5.3)
POTASSIUM SERPL-MCNC: 4.4 MMOL/L — SIGNIFICANT CHANGE UP (ref 3.5–5.3)
POTASSIUM SERPL-MCNC: 4.5 MMOL/L — SIGNIFICANT CHANGE UP (ref 3.5–5.3)
POTASSIUM SERPL-MCNC: 4.5 MMOL/L — SIGNIFICANT CHANGE UP (ref 3.5–5.3)
POTASSIUM SERPL-SCNC: 4.3 MMOL/L — SIGNIFICANT CHANGE UP (ref 3.5–5.3)
POTASSIUM SERPL-SCNC: 4.3 MMOL/L — SIGNIFICANT CHANGE UP (ref 3.5–5.3)
POTASSIUM SERPL-SCNC: 4.4 MMOL/L — SIGNIFICANT CHANGE UP (ref 3.5–5.3)
POTASSIUM SERPL-SCNC: 4.5 MMOL/L — SIGNIFICANT CHANGE UP (ref 3.5–5.3)
POTASSIUM SERPL-SCNC: 4.5 MMOL/L — SIGNIFICANT CHANGE UP (ref 3.5–5.3)
RBC # BLD: 2.59 M/UL — LOW (ref 4.2–5.8)
RBC # FLD: 14.6 % — HIGH (ref 10.3–14.5)
SODIUM SERPL-SCNC: 147 MMOL/L — HIGH (ref 135–145)
SODIUM SERPL-SCNC: 150 MMOL/L — HIGH (ref 135–145)
SODIUM SERPL-SCNC: 151 MMOL/L — HIGH (ref 135–145)
SODIUM SERPL-SCNC: 152 MMOL/L — HIGH (ref 135–145)
SODIUM SERPL-SCNC: 152 MMOL/L — HIGH (ref 135–145)
WBC # BLD: 14.52 K/UL — HIGH (ref 3.8–10.5)
WBC # FLD AUTO: 14.52 K/UL — HIGH (ref 3.8–10.5)

## 2021-11-11 PROCEDURE — 70496 CT ANGIOGRAPHY HEAD: CPT | Mod: 26

## 2021-11-11 PROCEDURE — 99291 CRITICAL CARE FIRST HOUR: CPT

## 2021-11-11 PROCEDURE — 71045 X-RAY EXAM CHEST 1 VIEW: CPT | Mod: 26,59

## 2021-11-11 RX ORDER — CALCIUM GLUCONATE 100 MG/ML
2 VIAL (ML) INTRAVENOUS ONCE
Refills: 0 | Status: COMPLETED | OUTPATIENT
Start: 2021-11-11 | End: 2021-11-11

## 2021-11-11 RX ORDER — SODIUM CHLORIDE 9 MG/ML
1000 INJECTION, SOLUTION INTRAVENOUS
Refills: 0 | Status: DISCONTINUED | OUTPATIENT
Start: 2021-11-11 | End: 2021-11-15

## 2021-11-11 RX ORDER — HUMAN INSULIN 100 [IU]/ML
12 INJECTION, SUSPENSION SUBCUTANEOUS EVERY 6 HOURS
Refills: 0 | Status: DISCONTINUED | OUTPATIENT
Start: 2021-11-11 | End: 2021-11-12

## 2021-11-11 RX ORDER — HUMAN INSULIN 100 [IU]/ML
10 INJECTION, SUSPENSION SUBCUTANEOUS EVERY 6 HOURS
Refills: 0 | Status: DISCONTINUED | OUTPATIENT
Start: 2021-11-11 | End: 2021-11-11

## 2021-11-11 RX ADMIN — Medication 200 MILLIGRAM(S): at 15:24

## 2021-11-11 RX ADMIN — PANTOPRAZOLE SODIUM 40 MILLIGRAM(S): 20 TABLET, DELAYED RELEASE ORAL at 11:08

## 2021-11-11 RX ADMIN — SODIUM CHLORIDE 75 MILLILITER(S): 9 INJECTION, SOLUTION INTRAVENOUS at 20:37

## 2021-11-11 RX ADMIN — Medication 4 MILLIGRAM(S): at 15:24

## 2021-11-11 RX ADMIN — HUMAN INSULIN 10 UNIT(S): 100 INJECTION, SUSPENSION SUBCUTANEOUS at 11:09

## 2021-11-11 RX ADMIN — Medication 4 MILLIGRAM(S): at 05:09

## 2021-11-11 RX ADMIN — Medication 12: at 01:00

## 2021-11-11 RX ADMIN — Medication 200 MILLIGRAM(S): at 20:35

## 2021-11-11 RX ADMIN — ATORVASTATIN CALCIUM 40 MILLIGRAM(S): 80 TABLET, FILM COATED ORAL at 21:54

## 2021-11-11 RX ADMIN — HUMAN INSULIN 8 UNIT(S): 100 INJECTION, SUSPENSION SUBCUTANEOUS at 01:01

## 2021-11-11 RX ADMIN — SODIUM CHLORIDE 75 MILLILITER(S): 9 INJECTION, SOLUTION INTRAVENOUS at 18:37

## 2021-11-11 RX ADMIN — Medication 200 MILLIGRAM(S): at 05:09

## 2021-11-11 RX ADMIN — Medication 8: at 11:08

## 2021-11-11 RX ADMIN — Medication 4 MILLIGRAM(S): at 21:54

## 2021-11-11 RX ADMIN — CHLORHEXIDINE GLUCONATE 15 MILLILITER(S): 213 SOLUTION TOPICAL at 05:09

## 2021-11-11 RX ADMIN — CHLORHEXIDINE GLUCONATE 1 APPLICATION(S): 213 SOLUTION TOPICAL at 21:54

## 2021-11-11 RX ADMIN — HUMAN INSULIN 10 UNIT(S): 100 INJECTION, SUSPENSION SUBCUTANEOUS at 05:37

## 2021-11-11 RX ADMIN — HUMAN INSULIN 12 UNIT(S): 100 INJECTION, SUSPENSION SUBCUTANEOUS at 18:36

## 2021-11-11 RX ADMIN — Medication 8: at 18:36

## 2021-11-11 RX ADMIN — Medication 12: at 05:38

## 2021-11-11 RX ADMIN — Medication 100 MILLIGRAM(S): at 05:09

## 2021-11-11 RX ADMIN — CHLORHEXIDINE GLUCONATE 15 MILLILITER(S): 213 SOLUTION TOPICAL at 18:37

## 2021-11-11 RX ADMIN — Medication 200 GRAM(S): at 20:37

## 2021-11-11 NOTE — PROGRESS NOTE ADULT - SUBJECTIVE AND OBJECTIVE BOX
NSCU Progress Note    Assessment/Hospital Course:  11/4: Admitted. intubated for GCS/Airway protection. EVD placed in ED POD0 SOC  11/5: s/p diagnostic angio L PICA aneurysm, untreated  11/9: worsening exam, started on HTS; s/p diagnostic angio with L PICA fistua      24 Hour Events/Subjective:  - EVD@10 with 91cc output in 24 hours   - febrile, pan cultured overnight  - plan for SOC for rxn of fistula today      REVIEW OF SYSTEMS:  - unable to obtain given mental status    VITALS:   Reviewed      PHYSICAL EXAM:  PHYSICAL EXAM:    General: calm  CVS: RRR  Pulm: CTAB  GI: Soft, NTND  Extremities: No LE Edema  Neuro: MELVI to central nox, +cough/gag ,+overbreathes, uppers 0/5, trace TF LLE, nothing RLE NSCU Progress Note    Assessment/Hospital Course:  11/4: Admitted. intubated for GCS/Airway protection. EVD placed in ED POD0 SOC  11/5: s/p diagnostic angio L PICA aneurysm, untreated  11/9: worsening exam, started on HTS; s/p diagnostic angio with L PICA fistua  11/10-: POD0 s/p suboccipital craniectomy for resection of PICA  aneurysm.    24 Hour Events/Subjective:  - EVD@10  - POD1 s/p suboccipital craniectomy for resection of PICA  aneurysm.      REVIEW OF SYSTEMS:  - unable to obtain given mental status    VITALS:   Reviewed    Labs:  Reviewed    PHYSICAL EXAM:  General: calm  CVS: RRR  Pulm: CTAB  GI: Soft, NTND  Extremities: No LE Edema  Neuro: MELVI to central nox, non reactive, +cough/gag ,+overbreathes, no corneals, uppers 0/5, spont LLE, nothing RLE NSCU Progress Note    Assessment/Hospital Course:  11/4: Admitted. intubated for GCS/Airway protection. EVD placed in ED POD0 SOC  11/5: s/p diagnostic angio L PICA aneurysm, untreated  11/9: worsening exam, started on HTS; s/p diagnostic angio with L PICA fistua  11/10-: POD0 s/p suboccipital craniectomy for resection of PICA  aneurysm.    24 Hour Events/Subjective:  - EVD@10  - POD1 s/p suboccipital craniectomy for resection of PICA  aneurysm  - monitoring for vasospasms      REVIEW OF SYSTEMS:  - unable to obtain given mental status    VITALS:   Reviewed    Labs:  Reviewed    PHYSICAL EXAM:  General: calm  CVS: RRR  Pulm: CTAB  GI: Soft, NTND  Extremities: No LE Edema  Neuro: MELVI to central nox, non reactive, +cough/gag ,+overbreathes, no corneals, uppers 0/5, spont LLE, nothing RLE

## 2021-11-11 NOTE — PROGRESS NOTE ADULT - SUBJECTIVE AND OBJECTIVE BOX
NSCU ATTENDING -- ADDITIONAL PROGRESS NOTE    Nighttime rounds were performed -- please refer to earlier Progress Note for HPI details.    Unchanged exam. CTH CTA without vasospasm. Cr trending down    T(C): 36.8 (11-11-21 @ 15:00), Max: 37.3 (11-11-21 @ 11:00)  HR: 65 (11-11-21 @ 16:15) (56 - 80)  BP: --  RR: 15 (11-11-21 @ 16:15) (11 - 23)  SpO2: 100% (11-11-21 @ 16:15) (95% - 100%)  Wt(kg): --    Relevant labwork and imaging reviewed.    Patient remains critically ill.    [A/P]  Continue current management  Monitor Cr after contrast. IVF  Additional 30 minutes of critical care time. NSCU ATTENDING -- ADDITIONAL PROGRESS NOTE    Nighttime rounds were performed -- please refer to earlier Progress Note for HPI details.    Unchanged exam. CTH CTA without vasospasm. Cr trending down    Exam: Intubated, MELVI, no FC, pupil 3mm NR bilat, L weak corneal, R +corneal,  overbreathes, +cough/gag, very trace movement to nox BUE, BLE trace TF    T(C): 36.8 (11-11-21 @ 15:00), Max: 37.3 (11-11-21 @ 11:00)  HR: 65 (11-11-21 @ 16:15) (56 - 80)  BP: --  RR: 15 (11-11-21 @ 16:15) (11 - 23)  SpO2: 100% (11-11-21 @ 16:15) (95% - 100%)  Wt(kg): --    Relevant labwork and imaging reviewed.    Patient remains critically ill.    [A/P]  Continue current management  Monitor Cr after contrast. IVF  Glucosa >200s, increased NPH to 15U  Additional 30 minutes of critical care time. NSCU ATTENDING -- ADDITIONAL PROGRESS NOTE    Nighttime rounds were performed -- please refer to earlier Progress Note for HPI details.    Unchanged exam. CTH CTA without vasospasm. Cr trending down    Exam: Intubated, MELVI, no FC, pupil 3mm NR bilat, L weak corneal, R +corneal,  overbreathes, +cough/gag, very trace movement to nox BUE, BLE trace TF    T(C): 36.8 (11-11-21 @ 15:00), Max: 37.3 (11-11-21 @ 11:00)  HR: 65 (11-11-21 @ 16:15) (56 - 80)  BP: --  RR: 15 (11-11-21 @ 16:15) (11 - 23)  SpO2: 100% (11-11-21 @ 16:15) (95% - 100%)  Wt(kg): --    Relevant labwork and imaging reviewed.    Patient remains critically ill.    [A/P]  Continue current management  Monitor Cr after contrast. IVF  Glucosa >200s, increased NPH to 15U

## 2021-11-11 NOTE — PROGRESS NOTE ADULT - ASSESSMENT
ANGLE ALICIA  61M pmhx HTN, DM at work complaining of HA ~8:30, starting feeling dizzy and vomiting, HTN/keke when EMS got to him. SBP 220s, HR 50s. CTH shows large posterior fossa bleed w/ IVH/SAH/hydro.   -Adm NSCU, q1h neuro check  -EVD in ED, drainage at 10  -s/p OR  -CT CTA AM

## 2021-11-11 NOTE — PROGRESS NOTE ADULT - SUBJECTIVE AND OBJECTIVE BOX
NSCU ATTENDING -- ADDITIONAL PROGRESS NOTE    Nighttime rounds were performed -- please refer to earlier Progress Note for HPI details.    T(C): 36 (11-10-21 @ 23:00), Max: 38 (11-10-21 @ 01:00)  HR: 59 (11-10-21 @ 23:00) (56 - 87)  BP: --  RR: 14 (11-10-21 @ 23:00) (9 - 23)  SpO2: 97% (11-10-21 @ 23:00) (95% - 100%)  Wt(kg): --    Relevant labwork and imaging reviewed.    Patient remains critically ill.    [A/P]    Additional 30 minutes of critical care time. NSCU ATTENDING -- ADDITIONAL PROGRESS NOTE    Nighttime rounds were performed -- please refer to earlier Progress Note for HPI details.    s/p suboccipital craniectomy for resection of PICA  aneurysm. TF restarted, FS >200, was pancultured earlier today    Neuro exam: MELVI, pupils 3mm NR +L corneal, +overbreathing, +cough gag BUE no movement to nox, BLE TF bilat    T(C): 36 (11-10-21 @ 23:00), Max: 38 (11-10-21 @ 01:00)  HR: 59 (11-10-21 @ 23:00) (56 - 87)  BP: --  RR: 14 (11-10-21 @ 23:00) (9 - 23)  SpO2: 97% (11-10-21 @ 23:00) (95% - 100%)  Wt(kg): --    Relevant labwork and imaging reviewed.    Patient remains critically ill.    [A/P]  Continue current management  SBP <160 in AM  DC teixeira in AM  Monitor sugars and adjust NPH as needed  LR for MICHELLE, monitor         Additional 30 minutes of critical care time. SEEN AT 11/10 BEFORE MIDNIGHT    NSCU ATTENDING -- ADDITIONAL PROGRESS NOTE    Nighttime rounds were performed -- please refer to earlier Progress Note for HPI details.    s/p suboccipital craniectomy for resection of PICA  aneurysm. TF restarted, FS >200, was pancultured earlier today    Neuro exam: MELVI, pupils 3mm NR +L corneal, +overbreathing, +cough gag BUE no movement to nox, BLE TF bilat    T(C): 36 (11-10-21 @ 23:00), Max: 38 (11-10-21 @ 01:00)  HR: 59 (11-10-21 @ 23:00) (56 - 87)  BP: --  RR: 14 (11-10-21 @ 23:00) (9 - 23)  SpO2: 97% (11-10-21 @ 23:00) (95% - 100%)  Wt(kg): --    Relevant labwork and imaging reviewed.    Patient remains critically ill.    [A/P]  Continue current management  SBP <160 in AM  DC teixeira in AM  Monitor sugars and adjust NPH as needed  LR for MICHELLE, monitor         Additional 30 minutes of critical care time.

## 2021-11-11 NOTE — PROGRESS NOTE ADULT - SUBJECTIVE AND OBJECTIVE BOX
Patient seen and examined at bedside.    --Anticoagulation--    T(C): 37 (11-11-21 @ 03:00), Max: 37.3 (11-10-21 @ 04:00)  HR: 66 (11-11-21 @ 03:00) (56 - 85)  BP: --  RR: 20 (11-11-21 @ 03:00) (12 - 23)  SpO2: 100% (11-11-21 @ 03:00) (95% - 100%)  Wt(kg): --    Exam:      intubated, weak corneals, pupils 3 NR, weak cough, no FC, flaccid

## 2021-11-11 NOTE — PROGRESS NOTE ADULT - ASSESSMENT
60 yo man hx of HTN, DM, admitted 11/4 with HA/N/V followed by unresponsiveness, CT head with posterior fossa hemorrhage c/b severe IVH with casting of the 4th and 3rd ventricles with hydrocephalus, s/p EVD and SOC. 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)  - neuro checks q1  - EVD@10, monitor output, drain care, ICP<22  - Dex4q8 for cerebral edema  - OR today for fistula rx  - pain control    CVS:  Afib RVR on admission  - SBP goal 100-140  - cardene PRN to goal  - labetalol 200mg q8h       PULM: resp failure requiring mech vent  Intubated for airway protection  - CPAP trials   - vent bundle  - chest PT    RENAL:   Baseline Cr 1.24  - Fluids: 75cc/h LR while NPO  - trend lytes  - daily IOs  - FWB 300q6h    GI:  - Diet: NPO for OR  - GI prophylaxis: PPI while intubated  - Bowel regimen- rectal tube  - Senna / Miralax q 12 / Mg Citrate prn  q day     ENDO:   A1c 6.4%  - FS goal 120-180  - MISS  - NPH 8q6 while NPO (restart after OR)    HEME/ONC:  LED negative 11/8  - SCDs  - Chemoppx: unsecured aneurysm, start after OR    ID:  - monitor for fevers  - f/u cultures from fever 11/10 overnight   60 yo man hx of HTN, DM, admitted 11/4 with HA/N/V followed by unresponsiveness, CT head with posterior fossa hemorrhage c/b severe IVH with casting of the 4th and 3rd ventricles with hydrocephalus, s/p EVD and SOC. 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)  - neuro checks q1  - EVD@10, monitor output, drain care, ICP<22  - Dex4q8 for cerebral edema  - CTH/CTA today  - pain control    CVS:  Afib RVR on admission  - SBP goal 100-160  - cardene PRN to goal  - labetalol 200mg q8h       PULM: resp failure requiring mech vent  Intubated for airway protection  - CPAP trials   - vent bundle  - chest PT    RENAL:   Baseline Cr 1.24  - Fluids: 75cc/h LR for CTA + bolus   - trend lytes  - daily IOs  - FWB 300q6h    GI:  - Diet: TF  - GI prophylaxis: PPI while intubated  - Bowel regimen - rectal tube  - Senna / Miralax q 12 / Mg Citrate prn  q day     ENDO:   A1c 6.4%  - FS goal 120-180  - MISS  - NPH 12q6 while NPO (restart after OR)    HEME/ONC:  LED negative 11/8  - SCDs  - Chemoppx: pending imaging today    ID:  - monitor for fevers  - f/u cultures from fever 11/10 overnight  - monitor for fevers with low threshold to start 62 yo man hx of HTN, DM, admitted 11/4 with HA/N/V followed by unresponsiveness, CT head with posterior fossa hemorrhage c/b severe IVH with casting of the 4th and 3rd ventricles with hydrocephalus, s/p EVD and SOC. 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)  - neuro checks q1  - EVD@10, monitor output, drain care, ICP<22  - Dex4q8 for cerebral edema  - CTH/CTA today  - pain control  - ongoing GOC with family  - monitor for vasospasm    CVS:  Afib RVR on admission  - SBP goal 100-160  - cardene PRN to goal  - labetalol 200mg q8h       PULM: resp failure requiring mech vent  Intubated for airway protection  - CPAP trials   - vent bundle  - chest PT    RENAL:   Baseline Cr 1.24  - Fluids: 75cc/h LR for CTA + bolus   - trend lytes  - daily IOs  - FWB 300q6h    GI:  - Diet: TF  - GI prophylaxis: PPI while intubated  - Bowel regimen - rectal tube  - Senna / Miralax q 12 / Mg Citrate prn  q day     ENDO:   A1c 6.4%  - FS goal 120-180  - MISS  - NPH 12q6 while NPO (restart after OR)    HEME/ONC:  LED negative 11/8  - SCDs  - Chemoppx: pending imaging today    ID:  - monitor for fevers  - f/u cultures from fever 11/10 overnight  - monitor for fevers with low threshold to start

## 2021-11-12 DIAGNOSIS — Z51.5 ENCOUNTER FOR PALLIATIVE CARE: ICD-10-CM

## 2021-11-12 DIAGNOSIS — I10 ESSENTIAL (PRIMARY) HYPERTENSION: ICD-10-CM

## 2021-11-12 DIAGNOSIS — Z71.89 OTHER SPECIFIED COUNSELING: ICD-10-CM

## 2021-11-12 DIAGNOSIS — R53.2 FUNCTIONAL QUADRIPLEGIA: ICD-10-CM

## 2021-11-12 DIAGNOSIS — I61.5 NONTRAUMATIC INTRACEREBRAL HEMORRHAGE, INTRAVENTRICULAR: ICD-10-CM

## 2021-11-12 LAB
-  AMIKACIN: SIGNIFICANT CHANGE UP
-  AMOXICILLIN/CLAVULANIC ACID: SIGNIFICANT CHANGE UP
-  AMPICILLIN/SULBACTAM: SIGNIFICANT CHANGE UP
-  AMPICILLIN: SIGNIFICANT CHANGE UP
-  AZTREONAM: SIGNIFICANT CHANGE UP
-  CEFAZOLIN: SIGNIFICANT CHANGE UP
-  CEFEPIME: SIGNIFICANT CHANGE UP
-  CEFOXITIN: SIGNIFICANT CHANGE UP
-  CEFTRIAXONE: SIGNIFICANT CHANGE UP
-  CIPROFLOXACIN: SIGNIFICANT CHANGE UP
-  ERTAPENEM: SIGNIFICANT CHANGE UP
-  GENTAMICIN: SIGNIFICANT CHANGE UP
-  IMIPENEM: SIGNIFICANT CHANGE UP
-  LEVOFLOXACIN: SIGNIFICANT CHANGE UP
-  MEROPENEM: SIGNIFICANT CHANGE UP
-  PIPERACILLIN/TAZOBACTAM: SIGNIFICANT CHANGE UP
-  TOBRAMYCIN: SIGNIFICANT CHANGE UP
-  TRIMETHOPRIM/SULFAMETHOXAZOLE: SIGNIFICANT CHANGE UP
ANION GAP SERPL CALC-SCNC: 10 MMOL/L — SIGNIFICANT CHANGE UP (ref 5–17)
ANION GAP SERPL CALC-SCNC: 12 MMOL/L — SIGNIFICANT CHANGE UP (ref 5–17)
ANION GAP SERPL CALC-SCNC: 9 MMOL/L — SIGNIFICANT CHANGE UP (ref 5–17)
APPEARANCE UR: CLEAR — SIGNIFICANT CHANGE UP
BILIRUB UR-MCNC: NEGATIVE — SIGNIFICANT CHANGE UP
BUN SERPL-MCNC: 47 MG/DL — HIGH (ref 7–23)
BUN SERPL-MCNC: 50 MG/DL — HIGH (ref 7–23)
BUN SERPL-MCNC: 53 MG/DL — HIGH (ref 7–23)
CALCIUM SERPL-MCNC: 8 MG/DL — LOW (ref 8.4–10.5)
CALCIUM SERPL-MCNC: 8.4 MG/DL — SIGNIFICANT CHANGE UP (ref 8.4–10.5)
CALCIUM SERPL-MCNC: 8.6 MG/DL — SIGNIFICANT CHANGE UP (ref 8.4–10.5)
CHLORIDE SERPL-SCNC: 113 MMOL/L — HIGH (ref 96–108)
CHLORIDE SERPL-SCNC: 114 MMOL/L — HIGH (ref 96–108)
CHLORIDE SERPL-SCNC: 115 MMOL/L — HIGH (ref 96–108)
CO2 SERPL-SCNC: 23 MMOL/L — SIGNIFICANT CHANGE UP (ref 22–31)
CO2 SERPL-SCNC: 25 MMOL/L — SIGNIFICANT CHANGE UP (ref 22–31)
CO2 SERPL-SCNC: 25 MMOL/L — SIGNIFICANT CHANGE UP (ref 22–31)
COLOR SPEC: SIGNIFICANT CHANGE UP
CREAT SERPL-MCNC: 1.37 MG/DL — HIGH (ref 0.5–1.3)
CREAT SERPL-MCNC: 1.47 MG/DL — HIGH (ref 0.5–1.3)
CREAT SERPL-MCNC: 1.52 MG/DL — HIGH (ref 0.5–1.3)
CULTURE RESULTS: SIGNIFICANT CHANGE UP
DIFF PNL FLD: NEGATIVE — SIGNIFICANT CHANGE UP
GLUCOSE SERPL-MCNC: 183 MG/DL — HIGH (ref 70–99)
GLUCOSE SERPL-MCNC: 247 MG/DL — HIGH (ref 70–99)
GLUCOSE SERPL-MCNC: 254 MG/DL — HIGH (ref 70–99)
GLUCOSE UR QL: ABNORMAL
KETONES UR-MCNC: NEGATIVE — SIGNIFICANT CHANGE UP
LEUKOCYTE ESTERASE UR-ACNC: NEGATIVE — SIGNIFICANT CHANGE UP
MAGNESIUM SERPL-MCNC: 2.6 MG/DL — SIGNIFICANT CHANGE UP (ref 1.6–2.6)
METHOD TYPE: SIGNIFICANT CHANGE UP
NITRITE UR-MCNC: NEGATIVE — SIGNIFICANT CHANGE UP
ORGANISM # SPEC MICROSCOPIC CNT: SIGNIFICANT CHANGE UP
ORGANISM # SPEC MICROSCOPIC CNT: SIGNIFICANT CHANGE UP
PH UR: 6 — SIGNIFICANT CHANGE UP (ref 5–8)
PHOSPHATE SERPL-MCNC: 3.6 MG/DL — SIGNIFICANT CHANGE UP (ref 2.5–4.5)
POTASSIUM SERPL-MCNC: 4 MMOL/L — SIGNIFICANT CHANGE UP (ref 3.5–5.3)
POTASSIUM SERPL-MCNC: 4.3 MMOL/L — SIGNIFICANT CHANGE UP (ref 3.5–5.3)
POTASSIUM SERPL-MCNC: 4.5 MMOL/L — SIGNIFICANT CHANGE UP (ref 3.5–5.3)
POTASSIUM SERPL-SCNC: 4 MMOL/L — SIGNIFICANT CHANGE UP (ref 3.5–5.3)
POTASSIUM SERPL-SCNC: 4.3 MMOL/L — SIGNIFICANT CHANGE UP (ref 3.5–5.3)
POTASSIUM SERPL-SCNC: 4.5 MMOL/L — SIGNIFICANT CHANGE UP (ref 3.5–5.3)
PROCALCITONIN SERPL-MCNC: 0.17 NG/ML — HIGH (ref 0.02–0.1)
PROT UR-MCNC: SIGNIFICANT CHANGE UP
SODIUM SERPL-SCNC: 147 MMOL/L — HIGH (ref 135–145)
SODIUM SERPL-SCNC: 149 MMOL/L — HIGH (ref 135–145)
SODIUM SERPL-SCNC: 150 MMOL/L — HIGH (ref 135–145)
SP GR SPEC: 1.02 — SIGNIFICANT CHANGE UP (ref 1.01–1.02)
SPECIMEN SOURCE: SIGNIFICANT CHANGE UP
UROBILINOGEN FLD QL: NEGATIVE — SIGNIFICANT CHANGE UP

## 2021-11-12 PROCEDURE — 99291 CRITICAL CARE FIRST HOUR: CPT

## 2021-11-12 PROCEDURE — 93888 INTRACRANIAL LIMITED STUDY: CPT | Mod: 26

## 2021-11-12 PROCEDURE — 99497 ADVNCD CARE PLAN 30 MIN: CPT | Mod: 25

## 2021-11-12 PROCEDURE — 99223 1ST HOSP IP/OBS HIGH 75: CPT

## 2021-11-12 PROCEDURE — 71045 X-RAY EXAM CHEST 1 VIEW: CPT | Mod: 26,59

## 2021-11-12 RX ORDER — PSYLLIUM SEED (WITH DEXTROSE)
1 POWDER (GRAM) ORAL
Refills: 0 | Status: DISCONTINUED | OUTPATIENT
Start: 2021-11-12 | End: 2021-11-16

## 2021-11-12 RX ORDER — HUMAN INSULIN 100 [IU]/ML
15 INJECTION, SUSPENSION SUBCUTANEOUS EVERY 6 HOURS
Refills: 0 | Status: DISCONTINUED | OUTPATIENT
Start: 2021-11-11 | End: 2021-11-12

## 2021-11-12 RX ORDER — HUMAN INSULIN 100 [IU]/ML
8 INJECTION, SUSPENSION SUBCUTANEOUS ONCE
Refills: 0 | Status: COMPLETED | OUTPATIENT
Start: 2021-11-12 | End: 2021-11-12

## 2021-11-12 RX ORDER — HUMAN INSULIN 100 [IU]/ML
23 INJECTION, SUSPENSION SUBCUTANEOUS EVERY 6 HOURS
Refills: 0 | Status: DISCONTINUED | OUTPATIENT
Start: 2021-11-12 | End: 2021-11-12

## 2021-11-12 RX ORDER — ENOXAPARIN SODIUM 100 MG/ML
40 INJECTION SUBCUTANEOUS
Refills: 0 | Status: DISCONTINUED | OUTPATIENT
Start: 2021-11-12 | End: 2021-11-15

## 2021-11-12 RX ORDER — HYDRALAZINE HCL 50 MG
10 TABLET ORAL ONCE
Refills: 0 | Status: COMPLETED | OUTPATIENT
Start: 2021-11-12 | End: 2021-11-12

## 2021-11-12 RX ORDER — HYDRALAZINE HCL 50 MG
5 TABLET ORAL ONCE
Refills: 0 | Status: COMPLETED | OUTPATIENT
Start: 2021-11-12 | End: 2021-11-12

## 2021-11-12 RX ORDER — HUMAN INSULIN 100 [IU]/ML
23 INJECTION, SUSPENSION SUBCUTANEOUS EVERY 6 HOURS
Refills: 0 | Status: DISCONTINUED | OUTPATIENT
Start: 2021-11-12 | End: 2021-11-14

## 2021-11-12 RX ORDER — DEXAMETHASONE 0.5 MG/5ML
4 ELIXIR ORAL EVERY 12 HOURS
Refills: 0 | Status: DISCONTINUED | OUTPATIENT
Start: 2021-11-12 | End: 2021-11-15

## 2021-11-12 RX ADMIN — HUMAN INSULIN 15 UNIT(S): 100 INJECTION, SUSPENSION SUBCUTANEOUS at 00:41

## 2021-11-12 RX ADMIN — HUMAN INSULIN 15 UNIT(S): 100 INJECTION, SUSPENSION SUBCUTANEOUS at 11:03

## 2021-11-12 RX ADMIN — ENOXAPARIN SODIUM 40 MILLIGRAM(S): 100 INJECTION SUBCUTANEOUS at 17:31

## 2021-11-12 RX ADMIN — Medication 200 MILLIGRAM(S): at 13:13

## 2021-11-12 RX ADMIN — Medication 200 MILLIGRAM(S): at 20:45

## 2021-11-12 RX ADMIN — Medication 4 MILLIGRAM(S): at 17:31

## 2021-11-12 RX ADMIN — ATORVASTATIN CALCIUM 40 MILLIGRAM(S): 80 TABLET, FILM COATED ORAL at 21:37

## 2021-11-12 RX ADMIN — Medication 200 MILLIGRAM(S): at 07:47

## 2021-11-12 RX ADMIN — Medication 8: at 17:32

## 2021-11-12 RX ADMIN — Medication 4 MILLIGRAM(S): at 06:39

## 2021-11-12 RX ADMIN — Medication 8: at 00:03

## 2021-11-12 RX ADMIN — CHLORHEXIDINE GLUCONATE 15 MILLILITER(S): 213 SOLUTION TOPICAL at 17:57

## 2021-11-12 RX ADMIN — Medication 8: at 06:39

## 2021-11-12 RX ADMIN — Medication 650 MILLIGRAM(S): at 07:44

## 2021-11-12 RX ADMIN — Medication 4: at 23:49

## 2021-11-12 RX ADMIN — PANTOPRAZOLE SODIUM 40 MILLIGRAM(S): 20 TABLET, DELAYED RELEASE ORAL at 11:03

## 2021-11-12 RX ADMIN — CHLORHEXIDINE GLUCONATE 15 MILLILITER(S): 213 SOLUTION TOPICAL at 06:34

## 2021-11-12 RX ADMIN — Medication 5 MILLIGRAM(S): at 00:58

## 2021-11-12 RX ADMIN — Medication 1 PACKET(S): at 17:57

## 2021-11-12 RX ADMIN — Medication 10 MILLIGRAM(S): at 02:25

## 2021-11-12 RX ADMIN — HUMAN INSULIN 8 UNIT(S): 100 INJECTION, SUSPENSION SUBCUTANEOUS at 13:13

## 2021-11-12 RX ADMIN — HUMAN INSULIN 23 UNIT(S): 100 INJECTION, SUSPENSION SUBCUTANEOUS at 17:31

## 2021-11-12 RX ADMIN — HUMAN INSULIN 15 UNIT(S): 100 INJECTION, SUSPENSION SUBCUTANEOUS at 06:41

## 2021-11-12 RX ADMIN — CHLORHEXIDINE GLUCONATE 1 APPLICATION(S): 213 SOLUTION TOPICAL at 21:36

## 2021-11-12 RX ADMIN — Medication 650 MILLIGRAM(S): at 09:22

## 2021-11-12 RX ADMIN — HUMAN INSULIN 23 UNIT(S): 100 INJECTION, SUSPENSION SUBCUTANEOUS at 23:51

## 2021-11-12 RX ADMIN — Medication 8: at 11:03

## 2021-11-12 NOTE — PROGRESS NOTE ADULT - ASSESSMENT
62 yo man hx of HTN, DM, admitted 11/4 with HA/N/V followed by unresponsiveness, CT head with posterior fossa hemorrhage c/b severe IVH with casting of the 4th and 3rd ventricles with hydrocephalus, s/p EVD and SOC. 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)  - neuro checks q1  - EVD@10, monitor output, drain care, ICP<22  - Dex4q8 for cerebral edema  - pain control  - ongoing GOC with family  - monitor for vasospasm  - palliative care consult    CVS:  Afib RVR on admission  - SBP goal 100-160  - cardene PRN to goal  - labetalol 200mg q8h       PULM: resp failure requiring mech vent  Intubated for airway protection  - CPAP trials   - vent bundle  - chest PT    RENAL:   Baseline Cr 1.24  - Fluids: 75cc/h LR for for [id number]  - trend lytes  - daily IOs  - FWB 300q6h    GI:  - Diet: TF  - GI prophylaxis: PPI while intubated  - Bowel regimen - rectal tube  - Senna / Miralax q 12 / Mg Citrate prn  q day     ENDO:   A1c 6.4%  - FS goal 120-180  - MISS  - NPH 15q6     HEME/ONC:  LED negative 11/8  - SCDs  - Chemoppx: pending imaging today    ID:  - monitor for fevers  - f/u cultures from fever 11/10 overnight  - monitor for fevers with low threshold to start 60 yo man hx of HTN, DM, admitted 11/4 with HA/N/V followed by unresponsiveness, CT head with posterior fossa hemorrhage c/b severe IVH with casting of the 4th and 3rd ventricles with hydrocephalus, s/p EVD and SOC. 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)  - neuro checks q1  - EVD@10, monitor output, drain care, ICP<22  - [id number] for cerebral edema  - pain control  - ongoing GOC with family  - monitor for vasospasm, TCDs  - palliative care consult    CVS:  Afib RVR on admission  - SBP goal 100-160  - cardene PRN to goal  - labetalol 200mg q8h       PULM: resp failure requiring mech vent  Intubated for airway protection  - CPAP trials   - vent bundle  - chest PT    RENAL:   Baseline Cr 1.24  - Fluids: 75cc/h LR for for [id number]  - trend lytes  - daily IOs  - FWB 300q6h    GI:  - Diet: TF  - GI prophylaxis: PPI while intubated  - Bowel regimen - rectal tube    ENDO:   A1c 6.4%  - FS goal 120-180  - MISS  - NPH 23q6     HEME/ONC:  LED negative 11/8  - SCDs  - Chemoppx: SQL    ID:  - monitor for fevers  - f/u cultures from fever 11/10 overnight

## 2021-11-12 NOTE — PROGRESS NOTE ADULT - ASSESSMENT
ANGLE ALICIA  61M pmhx HTN, DM at work complaining of HA ~8:30, starting feeling dizzy and vomiting, HTN/keke when EMS got to him. SBP 220s, HR 50s. CTH shows large posterior fossa bleed w/ IVH/SAH/hydro.   -Adm NSCU, q1h neuro check  -EVD in ED, drainage at 10  -s/p OR

## 2021-11-12 NOTE — PROGRESS NOTE ADULT - SUBJECTIVE AND OBJECTIVE BOX
NSCU Progress Note    Assessment/Hospital Course:  11/4: Admitted. intubated for GCS/Airway protection. EVD placed in ED POD0 SOC  11/5: s/p diagnostic angio L PICA aneurysm, untreated  11/9: worsening exam, started on HTS; s/p diagnostic angio with L PICA fistua  11/10-: POD0 s/p suboccipital craniectomy for resection of PICA  aneurysm  11/11: EVD@10,     24 Hour Events/Subjective:  - EVD@10  - monitoring for vasospasms, plan for TCDs today      REVIEW OF SYSTEMS:  - unable to obtain given mental status    VITALS:   Reviewed    Labs:  Reviewed    PHYSICAL EXAM:  General: calm  CVS: RRR  Pulm: CTAB  GI: Soft, NTND  Extremities: No LE Edema  Neuro: MELVI to central nox, non reactive, +cough/gag ,+overbreathes, no corneals, uppers 0/5, spont LLE, nothing RLE NSCU Progress Note    Assessment/Hospital Course:  11/4: Admitted. intubated for GCS/Airway protection. EVD placed in ED POD0 SOC  11/5: s/p diagnostic angio L PICA aneurysm, untreated  11/9: worsening exam, started on HTS; s/p diagnostic angio with L PICA fistua  11/10-: POD0 s/p suboccipital craniectomy for resection of PICA  aneurysm  11/11: EVD@10,     24 Hour Events/Subjective:  - EVD@10  - monitoring for vasospasms, plan for TCDs today  - febrile this morning, will culture    REVIEW OF SYSTEMS:  - unable to obtain given mental status    VITALS:   Reviewed    Labs:  Reviewed    PHYSICAL EXAM:  General: calm  CVS: RRR  Pulm: CTAB  GI: Soft, NTND  Extremities: No LE Edema  Neuro: MELVI to central nox, non reactive, +cough/gag ,+overbreathes, no corneals, uppers 0/5, spont LLE, nothing RLE

## 2021-11-12 NOTE — PROGRESS NOTE ADULT - SUBJECTIVE AND OBJECTIVE BOX
Patient seen and examined at bedside.    --Anticoagulation--    T(C): 37.4 (11-11-21 @ 19:00), Max: 37.4 (11-11-21 @ 19:00)  HR: 75 (11-12-21 @ 02:25) (62 - 80)  BP: --  RR: 13 (11-12-21 @ 02:25) (8 - 23)  SpO2: 100% (11-12-21 @ 02:25) (99% - 100%)  Wt(kg): --    Exam:    intubated, weak corneals, pupils 3 NR, cough, no FC, flaccid

## 2021-11-12 NOTE — CONSULT NOTE ADULT - PROBLEM SELECTOR RECOMMENDATION 9
CTH significant for hemorrhage in the fourth ventricle and cerebellar vermis with intraventricular hemorrhage extending into the third and lateral ventricles. Moderate hydrocephalus.  s/p EVD placement and suboccipital craniectomy  guarded prognosis

## 2021-11-12 NOTE — CONSULT NOTE ADULT - ASSESSMENT
62 y/o F hx of HTN, DM, admitted 11/4 with HA/N/V followed by unresponsiveness, CT head with posterior fossa hemorrhage c/b severe IVH and hydrocephalus, s/p EVD and suboccipital craniectomy. Poor prognosis. Palliative consulted for goals of care.

## 2021-11-12 NOTE — CONSULT NOTE ADULT - SUBJECTIVE AND OBJECTIVE BOX
HPI:  61M hx HTN, DM, cardiac stent on ASA. At work complaining of HA ~8:30, starting feeling dizzy and vomiting, HTN/bridger when EMS got to him. SBP 220s, HR 50s.     On EMS arrival at 09:39AM 21, patient seen talking a little, garbling, moving all extremities, then quickly became unresponsive. No known blood thinners.    CTH shows large posterior fossa bleed w/ IVH/SAH/hydro.    Exam:  Pre/intubation exam: no eyes open, pupils 2b/l, + corneals/cough/gag, not FC, LUE spont fingers moving, flacid otherwise    ICU Vital Signs Last 24 Hrs  T(C): 34.8 (2021 12:00), Max: 34.8 (2021 12:00)  T(F): 94.6 (2021 12:00), Max: 94.6 (2021 12:00)  HR: 127 (2021 11:30) (109 - 146)  BP: 123/78 (2021 11:30) (123/78 - 245/142)  BP(mean): --  ABP: --  ABP(mean): --  RR: 19 (2021 11:30) (19 - 30)  SpO2: 100% (2021 11:30) (100% - 100%)      140  |  103  |  18  ----------------------------<  226<H>  3.3<L>   |  19<L>  |  1.24    Ca    9.5      2021 10:16    TPro  7.8  /  Alb  4.8  /  TBili  0.4  /  DBili  x   /  AST  18  /  ALT  14  /  AlkPhos  94                          12.9   15.29 )-----------( 322      ( 2021 10:16 )             40.5    (2021 12:28)    PERTINENT PM/SXH:   HTN (hypertension)    Diabetes mellitus        FAMILY HISTORY:    ITEMS NOT CHECKED ARE NOT PRESENT    SOCIAL HISTORY:   Significant other/partner[x]  Children[ ]  Muslim/Spirituality:  Substance hx:  [ ]   Tobacco hx:  [ ]   Alcohol hx: [ ]   Home Opioid hx:  [ ] I-Stop Reference No:  Living Situation: [x]Home  [ ]Long term care  [ ]Rehab [ ]Other    ADVANCE DIRECTIVES:    DNR  MOLST  [ ]  Living Will  [ ]   DECISION MAKER(s):  [ ] Health Care Proxy(s)  [ ] Surrogate(s)  [ ] Guardian           Name(s): Phone Number(s):    BASELINE (I)ADL(s) (prior to admission):  [place]: [x]Total  [ ] Moderate [ ]Dependent    Allergies    penicillin (Other)    Intolerances    MEDICATIONS  (STANDING):  atorvastatin 40 milliGRAM(s) Oral at bedtime  chlorhexidine 0.12% Liquid 15 milliLiter(s) Oral Mucosa two times a day  chlorhexidine 4% Liquid 1 Application(s) Topical <User Schedule>  dexAMETHasone  Injectable 4 milliGRAM(s) IV Push every 12 hours  dextrose 50% Injectable 25 Gram(s) IV Push once  dextrose 50% Injectable 12.5 Gram(s) IV Push once  dextrose 50% Injectable 25 Gram(s) IV Push once  enoxaparin Injectable 40 milliGRAM(s) SubCutaneous <User Schedule>  influenza   Vaccine 0.5 milliLiter(s) IntraMuscular once  insulin lispro (ADMELOG) corrective regimen sliding scale   SubCutaneous every 6 hours  insulin NPH human recombinant 23 Unit(s) SubCutaneous every 6 hours  labetalol 200 milliGRAM(s) Oral three times a day  lactated ringers. 1000 milliLiter(s) (75 mL/Hr) IV Continuous <Continuous>  pantoprazole  Injectable 40 milliGRAM(s) IV Push daily  psyllium Powder 1 Packet(s) Oral two times a day    MEDICATIONS  (PRN):  acetaminophen    Suspension .. 650 milliGRAM(s) Oral every 6 hours PRN Temp greater or equal to 38C (100.4F), Mild Pain (1 - 3)    PRESENT SYMPTOMS: [ ]Unable to obtain due to poor mentation   Source if other than patient:  [ ]Family   [ ]Team     Pain: [ ]yes [ ]no  QOL impact -   Location -                    Aggravating factors -  Quality -  Radiation -  Timing-  Severity (0-10 scale):  Minimal acceptable level (0-10 scale):     CPOT:    https://www.[facility].org/getattachment/[email or web address]/Critical-Care-Pain-Observation-Tool-(CPOT)      PAIN AD Score:     http://geriatrictoolkit.[email or web address]/cog/painad.pdf (press ctrl +  left click to view)    Dyspnea:                           [ ]Mild [ ]Moderate [ ]Severe  Anxiety:                             [ ]Mild [ ]Moderate [ ]Severe  Fatigue:                             [ ]Mild [ ]Moderate [ ]Severe  Nausea:                             [ ]Mild [ ]Moderate [ ]Severe  Loss of appetite:              [ ]Mild [ ]Moderate [ ]Severe  Constipation:                    [ ]Mild [ ]Moderate [ ]Severe    Other Symptoms:  [ ]All other review of systems negative     Palliative Performance Status Version 2:    10    %    http://[facility].org/files/news/palliative_performance_scale_ppsv2.pdf  PHYSICAL EXAM:  Vital Signs Last 24 Hrs  T(C): 37.4 (2021 11:00), Max: 38.3 (2021 07:00)  T(F): 99.3 (2021 11:00), Max: 100.9 (2021 07:00)  HR: 65 (2021 14:00) (58 - 83)  BP: --  BP(mean): --  RR: 18 (2021 14:00) (8 - 22)  SpO2: 100% (2021 14:00) (99% - 100%) I&O's Summary    2021 07:01  -  2021 07:00  --------------------------------------------------------  IN: 5040 mL / OUT: 3015 mL / NET: 2025 mL    2021 07:01  -  2021 15:12  --------------------------------------------------------  IN: 1540 mL / OUT: 1020 mL / NET: 520 mL      GENERAL:   [ ]Alert  [ ]Oriented x   [ ]Lethargic  [ ]Cachexia  [x]Unarousable  [ ]Verbal  [ ]Non-Verbal  Behavioral:   [ ] Anxiety  [ ] Delirium [ ] Agitation [ ] Other  HEENT:  [ ]Normal   [ ]Dry mouth   [x]ET Tube/Trach  [ ]Oral lesions  PULMONARY:   [x]Clear [ ]Tachypnea  [ ]Audible excessive secretions   [ ]Rhonchi        [ ]Right [ ]Left [ ]Bilateral  [ ]Crackles        [ ]Right [ ]Left [ ]Bilateral  [ ]Wheezing     [ ]Right [ ]Left [ ]Bilateral  [ ]Diminished breath sounds [ ]right [ ]left [ ]bilateral  CARDIOVASCULAR:    [x]Regular [ ]Irregular [ ]Tachy  [ ]Bridger [ ]Murmur [ ]Other  GASTROINTESTINAL:  [x]Soft  [ ]Distended   [ ]+BS  [x]Non tender [ ]Tender  [ ]PEG [x]OGT/ NGT  Last BM:   GENITOURINARY:  [ ]Normal [ ] Incontinent   [ ]Oliguria/Anuria   [x]Barney  MUSCULOSKELETAL:   [ ]Normal   [ ]Weakness  [x]Bed/Wheelchair bound [ ]Edema  NEUROLOGIC:   [ ]No focal deficits  [ ]Cognitive impairment  [ ]Dysphagia [ ]Dysarthria [ ]Paresis [ ]Other   SKIN:   [ ]Normal    [ ]Rash  [ ]Pressure ulcer(s)       Present on admission [ ]y [ ]n    CRITICAL CARE:  [ ] Shock Present  [ ]Septic [ ]Cardiogenic [ ]Neurologic [ ]Hypovolemic  [ ]  Vasopressors [ ]  Inotropes   [ ]Respiratory failure present [ ]Mechanical ventilation [ ]Non-invasive ventilatory support [ ]High flow  [ ]Acute  [ ]Chronic [ ]Hypoxic  [ ]Hypercarbic [ ]Other  [x]Other organ failure -brain    LABS:                        7.8    14.52 )-----------( 249      ( 2021 21:36 )             24.4   11-12    149<H>  |  114<H>  |  53<H>  ----------------------------<  247<H>  4.5   |  23  |  1.52<H>    Ca    8.6      2021 12:22  Phos  3.6       Mg     2.6     -12        Urinalysis Basic - ( 2021 12:29 )    Color: Light Yellow / Appearance: Clear / S.025 / pH: x  Gluc: x / Ketone: Negative  / Bili: Negative / Urobili: Negative   Blood: x / Protein: Trace / Nitrite: Negative   Leuk Esterase: Negative / RBC: x / WBC x   Sq Epi: x / Non Sq Epi: x / Bacteria: x      RADIOLOGY & ADDITIONAL STUDIES:    < from: CT Head No Cont (21 @ 14:46) >    EXAM:  CT ANGIO BRAIN (W)AW IC                          EXAM:  CT BRAIN                            PROCEDURE DATE:  2021            INTERPRETATION:  CLINICAL INDICATION: Subarachnoid and intraventricular hemorrhage with 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 Coronal and sagittal computer generated reconstructed views are available.    Comparison is made with the prior CT of 11/10/2021 in the OR and the CTA of 2021 and the conventional angiogram of 2021.    There has been a suboccipital craniectomy. There is hemorrhage in the fourth ventricle and cerebellar vermis with intraventricular hemorrhage extending into the third and lateral ventricles. A right frontal ventricular catheter is identified with the tip in the third ventricle. Moderate hydrocephalus is unchanged. Since the prior exam there is  lucency in the left medial cerebellum which may be related to PICA infarct versus postoperative changes.            After the intravenous power injection of 70 cc of Omnipaque 300 using a bolus berenice timing run serial thin sections were obtained through the intracranial circulation centered at the puruet-ob-Baczsa on a  multislice CT scanner reformatted with coronal and sagittal 2 D-MIP projections, including 3 D reconstructions using a separate 3D Vitrea software workstation.A total of 70  cc of Omnipaque were intravenously injected.  30 cc were discarded.       The left vertebral artery is dominant. The left PICA is visualized although the distal PICA is not identified. No focal PICA aneurysm is seen. The right vertebral artery is normal. The basilar artery is normal.The posterior cerebral and superior cerebellar arteries are normal.    Evaluation of the carotid arteries demonstrate normal appearance cervical, petrous cavernous and supraclinoid internal carotid arteries. The anterior cerebral arteries anterior communicating artery and middle cerebral arteries are normal.      The normal intracranial venous circulation is identified. The transverse sinuses are codominant. The superior sagittal sinus, internal cerebral veins, vein of Zeeshan, straight sinus, transverse sinuses, sigmoid sinuses and internal jugular veins are normal. Cortical veins are normal.        IMPRESSION: Suboccipital craniectomy there are new postoperative changes versus infarct in the left medial cerebellum in the posterior inferior cerebellar artery territory. Intraventricular hemorrhage and hydrocephalus is unchanged since 2021. 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 The proximal PICA is visualized.    --- Endof Report ---          ERIN VALIENTE MD; Attending Radiologist  This document has been electronically signed. 2021  3:31PM    < end of copied text >      PROTEIN CALORIE MALNUTRITION PRESENT: [ ]mild [ ]moderate [ ]severe [ ]underweight [ ]morbid obesity  https://www.andeal.org/vault/2440/web/files/ONC/Table_Clinical%20Characteristics%20to%20Document%20Malnutrition-White%20JV%20et%20al%2020.pdf    Height (cm): 177.8 (11-10-21 @ 14:00)  Weight (kg): 86.8 (11-10-21 @ 14:00)  BMI (kg/m2): 27.5 (11-10-21 @ 14:00)    [ ]PPSV2 < or = to 30% [ ]significant weight loss  [ ]poor nutritional intake  [ ]anasarca      [ ]Artificial Nutrition      REFERRALS:   [ ]Chaplaincy  [ ]Hospice  [ ]Child Life  [ ]Social Work  [ ]Case management [ ]Holistic Therapy     Goals of Care Document:        HPI:  61M hx HTN, DM, cardiac stent on ASA. At work complaining of HA ~8:30, starting feeling dizzy and vomiting, HTN/bridger when EMS got to him. SBP 220s, HR 50s.     On EMS arrival at 09:39AM 21, patient seen talking a little, garbling, moving all extremities, then quickly became unresponsive. No known blood thinners.    CTH shows large posterior fossa bleed w/ IVH/SAH/hydro.    Exam:  Pre/intubation exam: no eyes open, pupils 2b/l, + corneals/cough/gag, not FC, LUE spont fingers moving, flacid otherwise    ICU Vital Signs Last 24 Hrs  T(C): 34.8 (2021 12:00), Max: 34.8 (2021 12:00)  T(F): 94.6 (2021 12:00), Max: 94.6 (2021 12:00)  HR: 127 (2021 11:30) (109 - 146)  BP: 123/78 (2021 11:30) (123/78 - 245/142)  BP(mean): --  ABP: --  ABP(mean): --  RR: 19 (2021 11:30) (19 - 30)  SpO2: 100% (2021 11:30) (100% - 100%)      140  |  103  |  18  ----------------------------<  226<H>  3.3<L>   |  19<L>  |  1.24    Ca    9.5      2021 10:16    TPro  7.8  /  Alb  4.8  /  TBili  0.4  /  DBili  x   /  AST  18  /  ALT  14  /  AlkPhos  94                          12.9   15.29 )-----------( 322      ( 2021 10:16 )             40.5    (2021 12:28)    PERTINENT PM/SXH:   HTN (hypertension)    Diabetes mellitus        FAMILY HISTORY:    ITEMS NOT CHECKED ARE NOT PRESENT    SOCIAL HISTORY:   Significant other/partner[x]  Children[ ]  Christianity/Spirituality:  Substance hx:  [ ]   Tobacco hx:  [ ]   Alcohol hx: [ ]   Home Opioid hx:  [ ] I-Stop Reference No:  Living Situation: [x]Home  [ ]Long term care  [ ]Rehab [ ]Other    ADVANCE DIRECTIVES:    DNR  MOLST  [ ]  Living Will  [ ]   DECISION MAKER(s):  [ ] Health Care Proxy(s)  [x] Surrogate(s)  [ ] Guardian           Name(s): Sandrita Camacho Phone Number(s): [phone]    BASELINE (I)ADL(s) (prior to admission):  [place]: [x]Total  [ ] Moderate [ ]Dependent    Allergies    penicillin (Other)    Intolerances    MEDICATIONS  (STANDING):  atorvastatin 40 milliGRAM(s) Oral at bedtime  chlorhexidine 0.12% Liquid 15 milliLiter(s) Oral Mucosa two times a day  chlorhexidine 4% Liquid 1 Application(s) Topical <User Schedule>  dexAMETHasone  Injectable 4 milliGRAM(s) IV Push every 12 hours  dextrose 50% Injectable 25 Gram(s) IV Push once  dextrose 50% Injectable 12.5 Gram(s) IV Push once  dextrose 50% Injectable 25 Gram(s) IV Push once  enoxaparin Injectable 40 milliGRAM(s) SubCutaneous <User Schedule>  influenza   Vaccine 0.5 milliLiter(s) IntraMuscular once  insulin lispro (ADMELOG) corrective regimen sliding scale   SubCutaneous every 6 hours  insulin NPH human recombinant 23 Unit(s) SubCutaneous every 6 hours  labetalol 200 milliGRAM(s) Oral three times a day  lactated ringers. 1000 milliLiter(s) (75 mL/Hr) IV Continuous <Continuous>  pantoprazole  Injectable 40 milliGRAM(s) IV Push daily  psyllium Powder 1 Packet(s) Oral two times a day    MEDICATIONS  (PRN):  acetaminophen    Suspension .. 650 milliGRAM(s) Oral every 6 hours PRN Temp greater or equal to 38C (100.4F), Mild Pain (1 - 3)    PRESENT SYMPTOMS: [x] Unable to obtain due to poor mentation   Source if other than patient:  [ ]Family   [x]Team     Pain: [ ]yes [x]no  QOL impact -   Location -                    Aggravating factors -  Quality -  Radiation -  Timing-  Severity (0-10 scale):  Minimal acceptable level (0-10 scale):     CPOT:    https://www.[facility].org/getattachment/[email or web address]/Critical-Care-Pain-Observation-Tool-(CPOT)      PAIN AD Score: 0    http://geriatrictoolkit.[email or web address]/cog/painad.pdf (press ctrl +  left click to view)    Dyspnea:                           [ ]Mild [ ]Moderate [ ]Severe  Anxiety:                             [ ]Mild [ ]Moderate [ ]Severe  Fatigue:                             [ ]Mild [ ]Moderate [ ]Severe  Nausea:                             [ ]Mild [ ]Moderate [ ]Severe  Loss of appetite:              [ ]Mild [ ]Moderate [ ]Severe  Constipation:                    [ ]Mild [ ]Moderate [ ]Severe    Other Symptoms:  [ ]All other review of systems negative -unable to assess due to lack of mental status    Palliative Performance Status Version 2:    10    %    http://npcrc.org/files/news/palliative_performance_scale_ppsv2.pdf  PHYSICAL EXAM:  Vital Signs Last 24 Hrs  T(C): 37.4 (2021 11:00), Max: 38.3 (2021 07:00)  T(F): 99.3 (2021 11:00), Max: 100.9 (2021 07:00)  HR: 65 (2021 14:00) (58 - 83)  BP: --  BP(mean): --  RR: 18 (2021 14:00) (8 - 22)  SpO2: 100% (2021 14:00) (99% - 100%) I&O's Summary    2021 07:  -  2021 07:00  --------------------------------------------------------  IN: 5040 mL / OUT: 3015 mL / NET: 2025 mL    2021 07:01  -  2021 15:12  --------------------------------------------------------  IN: 1540 mL / OUT: 1020 mL / NET: 520 mL      GENERAL:   [ ]Alert  [ ]Oriented x   [ ]Lethargic  [ ]Cachexia  [x]Unarousable  [ ]Verbal  [ ]Non-Verbal  Behavioral:   [ ] Anxiety  [ ] Delirium [ ] Agitation [ ] Other  HEENT:  [ ]Normal   [ ]Dry mouth   [x]ET Tube/Trach  [ ]Oral lesions  PULMONARY:   [x]Clear [ ]Tachypnea  [ ]Audible excessive secretions   [ ]Rhonchi        [ ]Right [ ]Left [ ]Bilateral  [ ]Crackles        [ ]Right [ ]Left [ ]Bilateral  [ ]Wheezing     [ ]Right [ ]Left [ ]Bilateral  [ ]Diminished breath sounds [ ]right [ ]left [ ]bilateral  CARDIOVASCULAR:    [x]Regular [ ]Irregular [ ]Tachy  [ ]Bridger [ ]Murmur [ ]Other  GASTROINTESTINAL:  [x]Soft  [ ]Distended   [ ]+BS  [x]Non tender [ ]Tender  [ ]PEG [x]OGT/ NGT  Last BM:   GENITOURINARY:  [ ]Normal [ ] Incontinent   [ ]Oliguria/Anuria   [x]Barney  MUSCULOSKELETAL:   [ ]Normal   [ ]Weakness  [x]Bed/Wheelchair bound [ ]Edema  NEUROLOGIC:   pupils nonreactive  [ ]No focal deficits  [x]Cognitive impairment  [ ]Dysphagia [ ]Dysarthria [x]Paresis [ ]Other   SKIN:   [x]Normal    [ ]Rash  [ ]Pressure ulcer(s)       Present on admission [ ]y [ ]n    CRITICAL CARE:  [ ] Shock Present  [ ]Septic [ ]Cardiogenic [ ]Neurologic [ ]Hypovolemic  [ ]  Vasopressors [ ]  Inotropes   [x]Respiratory failure present [x]Mechanical ventilation [ ]Non-invasive ventilatory support [ ]High flow  [x]Acute  [ ]Chronic [x]Hypoxic  [ ]Hypercarbic [ ]Other  [x]Other organ failure -brain    LABS:                        7.8    14.52 )-----------( 249      ( 2021 21:36 )             24.4   11-12    149<H>  |  114<H>  |  53<H>  ----------------------------<  247<H>  4.5   |  23  |  1.52<H>    Ca    8.6      2021 12:22  Phos  3.6       Mg     2.6             Urinalysis Basic - ( 2021 12:29 )    Color: Light Yellow / Appearance: Clear / S.025 / pH: x  Gluc: x / Ketone: Negative  / Bili: Negative / Urobili: Negative   Blood: x / Protein: Trace / Nitrite: Negative   Leuk Esterase: Negative / RBC: x / WBC x   Sq Epi: x / Non Sq Epi: x / Bacteria: x      RADIOLOGY & ADDITIONAL STUDIES:    < from: CT Head No Cont (21 @ 14:46) >    EXAM:  CT ANGIO BRAIN (W)AW IC                          EXAM:  CT BRAIN                            PROCEDURE DATE:  2021            INTERPRETATION:  CLINICAL INDICATION: Subarachnoid and intraventricular hemorrhage with 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 Coronal and sagittal computer generated reconstructed views are available.    Comparison is made with the prior CT of 11/10/2021 in the OR and the CTA of 2021 and the conventional angiogram of 2021.    There has been a suboccipital craniectomy. There is hemorrhage in the fourth ventricle and cerebellar vermis with intraventricular hemorrhage extending into the third and lateral ventricles. A right frontal ventricular catheter is identified with the tip in the third ventricle. Moderate hydrocephalus is unchanged. Since the prior exam there is  lucency in the left medial cerebellum which may be related to PICA infarct versus postoperative changes.            After the intravenous power injection of 70 cc of Omnipaque 300 using a bolus berenice timing run serial thin sections were obtained through the intracranial circulation centered at the vgvjjo-oj-Wulxyk on a  multislice CT scanner reformatted with coronal and sagittal 2 D-MIP projections, including 3 D reconstructions using a separate 3D Vitrea software workstation.A total of 70  cc of Omnipaque were intravenously injected.  30 cc were discarded.       The left vertebral artery is dominant. The left PICA is visualized although the distal PICA is not identified. No focal PICA aneurysm is seen. The right vertebral artery is normal. The basilar artery is normal.The posterior cerebral and superior cerebellar arteries are normal.    Evaluation of the carotid arteries demonstrate normal appearance cervical, petrous cavernous and supraclinoid internal carotid arteries. The anterior cerebral arteries anterior communicating artery and middle cerebral arteries are normal.      The normal intracranial venous circulation is identified. The transverse sinuses are codominant. The superior sagittal sinus, internal cerebral veins, vein of Zeeshan, straight sinus, transverse sinuses, sigmoid sinuses and internal jugular veins are normal. Cortical veins are normal.        IMPRESSION: Suboccipital craniectomy there are new postoperative changes versus infarct in the left medial cerebellum in the posterior inferior cerebellar artery territory. Intraventricular hemorrhage and hydrocephalus is unchanged since 2021. 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 The proximal PICA is visualized.    --- Endof Report ---          ERIN VALIENTE MD; Attending Radiologist  This document has been electronically signed. 2021  3:31PM    < end of copied text >      PROTEIN CALORIE MALNUTRITION PRESENT: [ ]mild [ ]moderate [ ]severe [ ]underweight [ ]morbid obesity  https://www.andeal.org/vault/2440/web/files/ONC/Table_Clinical%20Characteristics%20to%20Document%20Malnutrition-White%20JV%20et%20al%2020.pdf    Height (cm): 177.8 (11-10-21 @ 14:00)  Weight (kg): 86.8 (11-10-21 @ 14:00)  BMI (kg/m2): 27.5 (11-10-21 @ 14:00)    [ ]PPSV2 < or = to 30% [ ]significant weight loss  [ ]poor nutritional intake  [ ]anasarca      [ ]Artificial Nutrition      REFERRALS:   [ ]Chaplaincy  [ ]Hospice  [ ]Child Life  [ ]Social Work  [ ]Case management [ ]Holistic Therapy     Goals of Care Document:

## 2021-11-12 NOTE — PROGRESS NOTE ADULT - ATTENDING COMMENTS
EVD @ 10, continue to monitor ICPs, drain patent.  TCDs without spasm.  continue goals of care planning.

## 2021-11-12 NOTE — CONSULT NOTE ADULT - CONVERSATION DETAILS
Patient unable to participate in goals of care discussion due to IVH. Met with patient's wife and daughter at bedside. They understand patient has sustained a devastating neurologic injury and there is very low likelihood of him making a meaningful recovery. We discussed option for ongoing medical treatment and that patient would likely be bedbound requiring trach/vent and PEG support and live in NH facility. Wife appropriately tearful. We also discussed option for symptom-directed care with focus being patient's comfort and to allow for a natural passing. She stated patient never made his wishes known if this type of situation were to occur and she is struggling to make a decision regarding treatment options. She would like more time to think about options and is agreeable to a family meeting on Monday. [facility] All questions answered and emotional support provided. Wife requesting . RITA to page.

## 2021-11-12 NOTE — PROGRESS NOTE ADULT - SUBJECTIVE AND OBJECTIVE BOX
NSCU ATTENDING -- ADDITIONAL PROGRESS NOTE    Nighttime rounds were performed -- please refer to earlier Progress Note for HPI details.  Palliative following for meeting on Monday. CUltured today    Exam:    T(C): 37.1 (11-12-21 @ 15:00), Max: 38.3 (11-12-21 @ 07:00)  HR: 66 (11-12-21 @ 19:00) (58 - 83)  BP: --  RR: 11 (11-12-21 @ 19:00) (11 - 22)  SpO2: 100% (11-12-21 @ 19:00) (99% - 100%)  Wt(kg): --    Relevant labwork and imaging reviewed.    Patient remains critically ill.    [A/P]  Continue current management  Additional 30 minutes of critical care time. NSCU ATTENDING -- ADDITIONAL PROGRESS NOTE    Nighttime rounds were performed -- please refer to earlier Progress Note for HPI details.  Palliative following for meeting on Monday. CUltured today    Exam: Intubated, MELVI, no FC, pupil 3mm NR bilat, +corneal bilat, overbreathing, BUE nothing, BLE TF    T(C): 37.1 (11-12-21 @ 15:00), Max: 38.3 (11-12-21 @ 07:00)  HR: 66 (11-12-21 @ 19:00) (58 - 83)  BP: --  RR: 11 (11-12-21 @ 19:00) (11 - 22)  SpO2: 100% (11-12-21 @ 19:00) (99% - 100%)  Wt(kg): --    Relevant labwork and imaging reviewed.    Patient remains critically ill.    [A/P]  Continue current management, no change from day time plan  Additional 30 minutes of critical care time.

## 2021-11-13 LAB
ANION GAP SERPL CALC-SCNC: 10 MMOL/L — SIGNIFICANT CHANGE UP (ref 5–17)
ANION GAP SERPL CALC-SCNC: 10 MMOL/L — SIGNIFICANT CHANGE UP (ref 5–17)
BUN SERPL-MCNC: 44 MG/DL — HIGH (ref 7–23)
BUN SERPL-MCNC: 44 MG/DL — HIGH (ref 7–23)
CALCIUM SERPL-MCNC: 8.3 MG/DL — LOW (ref 8.4–10.5)
CALCIUM SERPL-MCNC: 8.3 MG/DL — LOW (ref 8.4–10.5)
CHLORIDE SERPL-SCNC: 110 MMOL/L — HIGH (ref 96–108)
CHLORIDE SERPL-SCNC: 114 MMOL/L — HIGH (ref 96–108)
CO2 SERPL-SCNC: 25 MMOL/L — SIGNIFICANT CHANGE UP (ref 22–31)
CO2 SERPL-SCNC: 26 MMOL/L — SIGNIFICANT CHANGE UP (ref 22–31)
CREAT SERPL-MCNC: 1.41 MG/DL — HIGH (ref 0.5–1.3)
CREAT SERPL-MCNC: 1.42 MG/DL — HIGH (ref 0.5–1.3)
GLUCOSE SERPL-MCNC: 115 MG/DL — HIGH (ref 70–99)
GLUCOSE SERPL-MCNC: 230 MG/DL — HIGH (ref 70–99)
GRAM STN FLD: SIGNIFICANT CHANGE UP
HCT VFR BLD CALC: 24.3 % — LOW (ref 39–50)
HGB BLD-MCNC: 7.7 G/DL — LOW (ref 13–17)
MAGNESIUM SERPL-MCNC: 2.1 MG/DL — SIGNIFICANT CHANGE UP (ref 1.6–2.6)
MCHC RBC-ENTMCNC: 30.6 PG — SIGNIFICANT CHANGE UP (ref 27–34)
MCHC RBC-ENTMCNC: 31.7 GM/DL — LOW (ref 32–36)
MCV RBC AUTO: 96.4 FL — SIGNIFICANT CHANGE UP (ref 80–100)
NRBC # BLD: 0 /100 WBCS — SIGNIFICANT CHANGE UP (ref 0–0)
PHOSPHATE SERPL-MCNC: 3.2 MG/DL — SIGNIFICANT CHANGE UP (ref 2.5–4.5)
PLATELET # BLD AUTO: 252 K/UL — SIGNIFICANT CHANGE UP (ref 150–400)
POTASSIUM SERPL-MCNC: 4.2 MMOL/L — SIGNIFICANT CHANGE UP (ref 3.5–5.3)
POTASSIUM SERPL-MCNC: 4.5 MMOL/L — SIGNIFICANT CHANGE UP (ref 3.5–5.3)
POTASSIUM SERPL-SCNC: 4.2 MMOL/L — SIGNIFICANT CHANGE UP (ref 3.5–5.3)
POTASSIUM SERPL-SCNC: 4.5 MMOL/L — SIGNIFICANT CHANGE UP (ref 3.5–5.3)
RBC # BLD: 2.52 M/UL — LOW (ref 4.2–5.8)
RBC # FLD: 14.2 % — SIGNIFICANT CHANGE UP (ref 10.3–14.5)
SODIUM SERPL-SCNC: 145 MMOL/L — SIGNIFICANT CHANGE UP (ref 135–145)
SODIUM SERPL-SCNC: 150 MMOL/L — HIGH (ref 135–145)
SPECIMEN SOURCE: SIGNIFICANT CHANGE UP
WBC # BLD: 15.57 K/UL — HIGH (ref 3.8–10.5)
WBC # FLD AUTO: 15.57 K/UL — HIGH (ref 3.8–10.5)

## 2021-11-13 PROCEDURE — 70450 CT HEAD/BRAIN W/O DYE: CPT | Mod: 26,59

## 2021-11-13 PROCEDURE — 99291 CRITICAL CARE FIRST HOUR: CPT

## 2021-11-13 PROCEDURE — 71045 X-RAY EXAM CHEST 1 VIEW: CPT | Mod: 26,59

## 2021-11-13 RX ORDER — NIMODIPINE 60 MG/10ML
60 SOLUTION ORAL EVERY 4 HOURS
Refills: 0 | Status: DISCONTINUED | OUTPATIENT
Start: 2021-11-13 | End: 2021-11-14

## 2021-11-13 RX ORDER — INSULIN LISPRO 100/ML
VIAL (ML) SUBCUTANEOUS EVERY 6 HOURS
Refills: 0 | Status: DISCONTINUED | OUTPATIENT
Start: 2021-11-13 | End: 2021-11-21

## 2021-11-13 RX ORDER — INSULIN LISPRO 100/ML
VIAL (ML) SUBCUTANEOUS EVERY 6 HOURS
Refills: 0 | Status: DISCONTINUED | OUTPATIENT
Start: 2021-11-13 | End: 2021-11-13

## 2021-11-13 RX ORDER — HYDRALAZINE HCL 50 MG
10 TABLET ORAL ONCE
Refills: 0 | Status: COMPLETED | OUTPATIENT
Start: 2021-11-13 | End: 2021-11-13

## 2021-11-13 RX ORDER — NIMODIPINE 60 MG/10ML
60 SOLUTION ORAL EVERY 4 HOURS
Refills: 0 | Status: DISCONTINUED | OUTPATIENT
Start: 2021-11-13 | End: 2021-11-13

## 2021-11-13 RX ORDER — FENTANYL CITRATE 50 UG/ML
25 INJECTION INTRAVENOUS ONCE
Refills: 0 | Status: DISCONTINUED | OUTPATIENT
Start: 2021-11-13 | End: 2021-11-13

## 2021-11-13 RX ADMIN — Medication 10 MILLIGRAM(S): at 10:40

## 2021-11-13 RX ADMIN — Medication 200 MILLIGRAM(S): at 21:37

## 2021-11-13 RX ADMIN — PANTOPRAZOLE SODIUM 40 MILLIGRAM(S): 20 TABLET, DELAYED RELEASE ORAL at 13:35

## 2021-11-13 RX ADMIN — SODIUM CHLORIDE 75 MILLILITER(S): 9 INJECTION, SOLUTION INTRAVENOUS at 21:41

## 2021-11-13 RX ADMIN — Medication 4 MILLIGRAM(S): at 17:36

## 2021-11-13 RX ADMIN — CHLORHEXIDINE GLUCONATE 1 APPLICATION(S): 213 SOLUTION TOPICAL at 21:41

## 2021-11-13 RX ADMIN — ENOXAPARIN SODIUM 40 MILLIGRAM(S): 100 INJECTION SUBCUTANEOUS at 17:36

## 2021-11-13 RX ADMIN — FENTANYL CITRATE 25 MICROGRAM(S): 50 INJECTION INTRAVENOUS at 10:15

## 2021-11-13 RX ADMIN — Medication 1 PACKET(S): at 07:44

## 2021-11-13 RX ADMIN — Medication 200 MILLIGRAM(S): at 13:38

## 2021-11-13 RX ADMIN — ATORVASTATIN CALCIUM 40 MILLIGRAM(S): 80 TABLET, FILM COATED ORAL at 21:37

## 2021-11-13 RX ADMIN — HUMAN INSULIN 23 UNIT(S): 100 INJECTION, SUSPENSION SUBCUTANEOUS at 17:37

## 2021-11-13 RX ADMIN — FENTANYL CITRATE 25 MICROGRAM(S): 50 INJECTION INTRAVENOUS at 10:30

## 2021-11-13 RX ADMIN — Medication 4 MILLIGRAM(S): at 05:10

## 2021-11-13 RX ADMIN — Medication 8: at 17:37

## 2021-11-13 RX ADMIN — Medication 1 PACKET(S): at 17:36

## 2021-11-13 RX ADMIN — CHLORHEXIDINE GLUCONATE 15 MILLILITER(S): 213 SOLUTION TOPICAL at 17:38

## 2021-11-13 RX ADMIN — SODIUM CHLORIDE 75 MILLILITER(S): 9 INJECTION, SOLUTION INTRAVENOUS at 07:44

## 2021-11-13 RX ADMIN — Medication 200 MILLIGRAM(S): at 05:09

## 2021-11-13 RX ADMIN — CHLORHEXIDINE GLUCONATE 15 MILLILITER(S): 213 SOLUTION TOPICAL at 07:13

## 2021-11-13 NOTE — PROGRESS NOTE ADULT - SUBJECTIVE AND OBJECTIVE BOX
EVENTS: EVD clamped today, CTH in AM monday    VITALS:  T(C): , Max: 37.9 (11-13-21 @ 07:00)  HR:  (65 - 89)  BP: --  ABP:  (107/57 - 179/81)  RR:  (12 - 21)  SpO2:  (94% - 100%)  Wt(kg): --  Device: Avea, Mode: AC/ CMV (Assist Control/ Continuous Mandatory Ventilation), RR (machine): 20, TV (machine): 450, FiO2: 30, PEEP: 5, ITime: 1, MAP: 10, PIP: 20    11-12-21 @ 07:01  -  11-13-21 @ 07:00  --------------------------------------------------------  IN: 4840 mL / OUT: 3270 mL / NET: 1570 mL    11-13-21 @ 07:01  -  11-13-21 @ 20:21  --------------------------------------------------------  IN: 1855 mL / OUT: 2375 mL / NET: -520 mL      LABS:  Na: 150 (11-13 @ 06:41), 150 (11-12 @ 19:21), 149 (11-12 @ 12:22), 147 (11-12 @ 06:31), 152 (11-11 @ 21:36), 147 (11-11 @ 18:59), 152 (11-11 @ 11:18), 150 (11-11 @ 05:05), 151 (11-10 @ 23:36)  K: 4.2 (11-13 @ 06:41), 4.0 (11-12 @ 19:21), 4.5 (11-12 @ 12:22), 4.3 (11-12 @ 06:31), 4.5 (11-11 @ 21:36), 4.4 (11-11 @ 18:59), 4.5 (11-11 @ 11:18), 4.3 (11-11 @ 05:05), 4.3 (11-10 @ 23:36)  Cl: 114 (11-13 @ 06:41), 115 (11-12 @ 19:21), 114 (11-12 @ 12:22), 113 (11-12 @ 06:31), 117 (11-11 @ 21:36), 114 (11-11 @ 18:59), 117 (11-11 @ 11:18), 117 (11-11 @ 05:05), 117 (11-10 @ 23:36)  CO2: 26 (11-13 @ 06:41), 25 (11-12 @ 19:21), 23 (11-12 @ 12:22), 25 (11-12 @ 06:31), 24 (11-11 @ 21:36), 23 (11-11 @ 18:59), 23 (11-11 @ 11:18), 22 (11-11 @ 05:05), 22 (11-10 @ 23:36)  BUN: 44 (11-13 @ 06:41), 47 (11-12 @ 19:21), 53 (11-12 @ 12:22), 50 (11-12 @ 06:31), 54 (11-11 @ 21:36), 54 (11-11 @ 18:59), 58 (11-11 @ 11:18), 56 (11-11 @ 05:05), 56 (11-10 @ 23:36)  Cr: 1.42 (11-13 @ 06:41), 1.37 (11-12 @ 19:21), 1.52 (11-12 @ 12:22), 1.47 (11-12 @ 06:31), 1.48 (11-11 @ 21:36), 1.47 (11-11 @ 18:59), 1.53 (11-11 @ 11:18), 1.56 (11-11 @ 05:05), 1.66 (11-10 @ 23:36)  Glu: 115(11-13 @ 06:41), 183(11-12 @ 19:21), 247(11-12 @ 12:22), 254(11-12 @ 06:31), 252(11-11 @ 21:36), 280(11-11 @ 18:59), 245(11-11 @ 11:18), 279(11-11 @ 05:05), 236(11-10 @ 23:36)    Hgb: 7.8 (11-11 @ 21:36), 7.9 (11-10 @ 23:36)  Hct: 24.4 (11-11 @ 21:36), 25.9 (11-10 @ 23:36)  WBC: 14.52 (11-11 @ 21:36), 15.92 (11-10 @ 23:36)  Plt: 249 (11-11 @ 21:36), 222 (11-10 @ 23:36)    INR:   PTT:     MEDICATIONS:  acetaminophen    Suspension .. 650 milliGRAM(s) Oral every 6 hours PRN  atorvastatin 40 milliGRAM(s) Oral at bedtime  chlorhexidine 0.12% Liquid 15 milliLiter(s) Oral Mucosa two times a day  chlorhexidine 4% Liquid 1 Application(s) Topical <User Schedule>  dexAMETHasone  Injectable 4 milliGRAM(s) IV Push every 12 hours  dextrose 50% Injectable 25 Gram(s) IV Push once  dextrose 50% Injectable 12.5 Gram(s) IV Push once  dextrose 50% Injectable 25 Gram(s) IV Push once  enoxaparin Injectable 40 milliGRAM(s) SubCutaneous <User Schedule>  influenza   Vaccine 0.5 milliLiter(s) IntraMuscular once  insulin lispro (ADMELOG) corrective regimen sliding scale   SubCutaneous every 6 hours  insulin NPH human recombinant 23 Unit(s) SubCutaneous every 6 hours  labetalol 200 milliGRAM(s) Oral three times a day  lactated ringers. 1000 milliLiter(s) IV Continuous <Continuous>  pantoprazole  Injectable 40 milliGRAM(s) IV Push daily  psyllium Powder 1 Packet(s) Oral two times a day    EXAMINATION:  General:  calm  HEENT:  MMM  Neuro:  MELVI to central nox, non reactive, +cough/gag ,+overbreathes, no corneals, uppers 0/5, spont LLE, nothing RLE  Cards:  RRR  Respiratory:  no respiratory distress  Abdomen:  soft  Extremities:  no edema       EVENTS: EVD clamped today, CTH in AM monday    VITALS:  T(C): , Max: 37.9 (11-13-21 @ 07:00)  HR:  (65 - 89)  BP: --  ABP:  (107/57 - 179/81)  RR:  (12 - 21)  SpO2:  (94% - 100%)  Wt(kg): --  Device: Avea, Mode: AC/ CMV (Assist Control/ Continuous Mandatory Ventilation), RR (machine): 20, TV (machine): 450, FiO2: 30, PEEP: 5, ITime: 1, MAP: 10, PIP: 20    11-12-21 @ 07:01  -  11-13-21 @ 07:00  --------------------------------------------------------  IN: 4840 mL / OUT: 3270 mL / NET: 1570 mL    11-13-21 @ 07:01  -  11-13-21 @ 20:21  --------------------------------------------------------  IN: 1855 mL / OUT: 2375 mL / NET: -520 mL      LABS:  Na: 150 (11-13 @ 06:41), 150 (11-12 @ 19:21), 149 (11-12 @ 12:22), 147 (11-12 @ 06:31), 152 (11-11 @ 21:36), 147 (11-11 @ 18:59), 152 (11-11 @ 11:18), 150 (11-11 @ 05:05), 151 (11-10 @ 23:36)  K: 4.2 (11-13 @ 06:41), 4.0 (11-12 @ 19:21), 4.5 (11-12 @ 12:22), 4.3 (11-12 @ 06:31), 4.5 (11-11 @ 21:36), 4.4 (11-11 @ 18:59), 4.5 (11-11 @ 11:18), 4.3 (11-11 @ 05:05), 4.3 (11-10 @ 23:36)  Cl: 114 (11-13 @ 06:41), 115 (11-12 @ 19:21), 114 (11-12 @ 12:22), 113 (11-12 @ 06:31), 117 (11-11 @ 21:36), 114 (11-11 @ 18:59), 117 (11-11 @ 11:18), 117 (11-11 @ 05:05), 117 (11-10 @ 23:36)  CO2: 26 (11-13 @ 06:41), 25 (11-12 @ 19:21), 23 (11-12 @ 12:22), 25 (11-12 @ 06:31), 24 (11-11 @ 21:36), 23 (11-11 @ 18:59), 23 (11-11 @ 11:18), 22 (11-11 @ 05:05), 22 (11-10 @ 23:36)  BUN: 44 (11-13 @ 06:41), 47 (11-12 @ 19:21), 53 (11-12 @ 12:22), 50 (11-12 @ 06:31), 54 (11-11 @ 21:36), 54 (11-11 @ 18:59), 58 (11-11 @ 11:18), 56 (11-11 @ 05:05), 56 (11-10 @ 23:36)  Cr: 1.42 (11-13 @ 06:41), 1.37 (11-12 @ 19:21), 1.52 (11-12 @ 12:22), 1.47 (11-12 @ 06:31), 1.48 (11-11 @ 21:36), 1.47 (11-11 @ 18:59), 1.53 (11-11 @ 11:18), 1.56 (11-11 @ 05:05), 1.66 (11-10 @ 23:36)  Glu: 115(11-13 @ 06:41), 183(11-12 @ 19:21), 247(11-12 @ 12:22), 254(11-12 @ 06:31), 252(11-11 @ 21:36), 280(11-11 @ 18:59), 245(11-11 @ 11:18), 279(11-11 @ 05:05), 236(11-10 @ 23:36)    Hgb: 7.8 (11-11 @ 21:36), 7.9 (11-10 @ 23:36)  Hct: 24.4 (11-11 @ 21:36), 25.9 (11-10 @ 23:36)  WBC: 14.52 (11-11 @ 21:36), 15.92 (11-10 @ 23:36)  Plt: 249 (11-11 @ 21:36), 222 (11-10 @ 23:36)    MEDICATIONS:  acetaminophen    Suspension .. 650 milliGRAM(s) Oral every 6 hours PRN  atorvastatin 40 milliGRAM(s) Oral at bedtime  chlorhexidine 0.12% Liquid 15 milliLiter(s) Oral Mucosa two times a day  chlorhexidine 4% Liquid 1 Application(s) Topical <User Schedule>  dexAMETHasone  Injectable 4 milliGRAM(s) IV Push every 12 hours  dextrose 50% Injectable 25 Gram(s) IV Push once  dextrose 50% Injectable 12.5 Gram(s) IV Push once  dextrose 50% Injectable 25 Gram(s) IV Push once  enoxaparin Injectable 40 milliGRAM(s) SubCutaneous <User Schedule>  influenza   Vaccine 0.5 milliLiter(s) IntraMuscular once  insulin lispro (ADMELOG) corrective regimen sliding scale   SubCutaneous every 6 hours  insulin NPH human recombinant 23 Unit(s) SubCutaneous every 6 hours  labetalol 200 milliGRAM(s) Oral three times a day  lactated ringers. 1000 milliLiter(s) IV Continuous <Continuous>  pantoprazole  Injectable 40 milliGRAM(s) IV Push daily  psyllium Powder 1 Packet(s) Oral two times a day    EXAMINATION:  General:  in NAD  HEENT:  MMM  Neuro:  MELVI to central nox, pupils non reactive, no corneals b/l, +cough/gag ,+overbreathes, uppers 0/5, triple flexes RLE, trace flexion in LLE  Cards:  RRR  Respiratory: CTAB  Abdomen:  soft  Extremities:  no edema

## 2021-11-13 NOTE — PROGRESS NOTE ADULT - ATTENDING COMMENTS
Patient seen and examined by attending on 11/13/2021.    Patient is critically ill due to SAH and at high risk for neurological deterioration or death due to: brainstem compression, hydrocephalus, possible DCI

## 2021-11-13 NOTE — PROGRESS NOTE ADULT - SUBJECTIVE AND OBJECTIVE BOX
NSCU Progress Note    Assessment/Hospital Course:  11/4: Admitted. intubated for GCS/Airway protection. EVD placed in ED POD0 SOC  11/5: s/p diagnostic angio L PICA aneurysm, untreated  11/9: worsening exam, started on HTS; s/p diagnostic angio with L PICA fistua  11/10-: POD0 s/p suboccipital craniectomy for resection of PICA  aneurysm  11/11: EVD@10,     24 Hour Events/Subjective:  - EVD@10  - monitoring for vasospasms, plan for TCDs today  - febrile this morning, will culture    REVIEW OF SYSTEMS:  - unable to obtain given mental status    VITALS:   Reviewed    Labs:  Reviewed    PHYSICAL EXAM:  General: calm  CVS: RRR  Pulm: CTAB  GI: Soft, NTND  Extremities: No LE Edema  Neuro: MELVI to central nox, non reactive, +cough/gag ,+overbreathes, no corneals, uppers 0/5, spont LLE, nothing RLE NSCU Progress Note    Assessment/Hospital Course:  11/4: Admitted. intubated for GCS/Airway protection. EVD placed in ED POD0 SOC  11/5: s/p diagnostic angio L PICA aneurysm, untreated  11/9: worsening exam, started on HTS; s/p diagnostic angio with L PICA fistua  11/10-: POD0 s/p suboccipital craniectomy for resection of PICA  aneurysm  11/11: EVD@10     24 Hour Events/Subjective:  - EVD Clamped  - TCDs WNL    REVIEW OF SYSTEMS:  - unable to obtain given mental status    VITALS:   Reviewed    Labs:  Reviewed    PHYSICAL EXAM:  General: calm  CVS: RRR  Pulm: CTAB  GI: Soft, NTND  Extremities: No LE Edema  Neuro: MELVI to central nox, non reactive, +cough/gag ,+overbreathes, no corneals, uppers 0/5, spont LLE, nothing RLE

## 2021-11-13 NOTE — PROGRESS NOTE ADULT - ASSESSMENT
62 yo man hx of HTN, DM, admitted 11/4 with HA/N/V followed by unresponsiveness, CT head with posterior fossa hemorrhage c/b severe IVH with casting of the 4th and 3rd ventricles with hydrocephalus, s/p EVD and SOC. 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)  - neuro checks q1  - EVD@10, monitor output, drain care, ICP<22  - [id number] for cerebral edema  - pain control  - ongoing GOC with family  - monitor for vasospasm, TCDs  - palliative care consult    CVS:  Afib RVR on admission  - SBP goal 100-160  - cardene PRN to goal  - labetalol 200mg q8h       PULM: resp failure requiring mech vent  Intubated for airway protection  - CPAP trials   - vent bundle  - chest PT    RENAL:   Baseline Cr 1.24  - Fluids: 75cc/h LR for for [id number]  - trend lytes  - daily IOs  - FWB 300q6h    GI:  - Diet: TF  - GI prophylaxis: PPI while intubated  - Bowel regimen - rectal tube    ENDO:   A1c 6.4%  - FS goal 120-180  - MISS  - NPH 23q6     HEME/ONC:  LED negative 11/8  - SCDs  - Chemoppx: SQL    ID:  - monitor for fevers  - f/u cultures from fever 11/10 overnight 62 yo man hx of HTN, DM, admitted 11/4 with HA/N/V followed by unresponsiveness, CT head with posterior fossa hemorrhage c/b severe IVH with casting of the 4th and 3rd ventricles with hydrocephalus, s/p EVD and SOC, 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, untreated  s/p diagnostic angio (11/9) with small L PICA fistula (unlikely aneurysm  - neuro checks q1  - EVD Clamped 11/13 @10am  - [id number] for cerebral edema  - pain control  - ongoing GOC with family  - monitor for vasospasm, TCDs  - palliative care consult, family meeting on Monday    CVS:  Afib RVR on admission  - SBP goal 100-180  - cardene PRN to goal  - labetalol 200mg q8h       PULM: resp failure requiring mech vent  Intubated for airway protection  - CPAP trials   - vent bundle  - chest PT    RENAL:   Baseline Cr 1.24  - Fluids: 75cc/h LR for for [id number]  - trend lytes  - daily IOs  - FWB 300q6h    GI:  - Diet: TF  - GI prophylaxis: PPI while intubated  - Bowel regimen - rectal tube    ENDO:   A1c 6.4%  - FS goal 120-180  - MISS  - NPH 23q6     HEME/ONC:  LED negative 11/8  - SCDs  - Chemoppx: SQL    ID:  - monitor for fevers  - f/u cultures from fever 11/10 overnight

## 2021-11-13 NOTE — PROGRESS NOTE ADULT - ASSESSMENT
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 EVD clamped 11/13      NEURO:   neuro checks q1  - EVD Clamped 11/13 @10am, CTH monday  - [id number] for cerebral edema  - pain control  - ongoing GOC with family  - monitor for vasospasm, TCDs  - palliative care consult, family meeting on Monday    PULM:  Intubated for airway protection  - CPAP trials   - vent bundle  - chest PT    CV:  Afib RVR on admission  - SBP goal 100-180  - cardene PRN to goal  - labetalol 200mg q8h     RENAL:  Baseline Cr 1.24, currently 1.4  - Fluids: 75cc/h LR continue  - trend lytes  - daily IOs  - FWB 300q6h    GI:   Diet: TF  - GI prophylaxis: PPI while intubated  - Bowel regimen - rectal tube    ENDO:   A1c 6.4%  - FS goal 120-180  - MISS  - NPH 23q6     HEME/ONC:  LED negative 11/8  - SCDs  - Chemoppx: SQL    ID:  Fu cultures, NGTD    CODE STATUS:  [x] Full Code [] DNR [] DNI [] Palliative/Comfort Care    DISPOSITION:  [x] ICU [] Stroke Unit [] Floor [] EMU [] RCU [] PCU   ASSESSMENT: 60 yo man hx of HTN, DM, admitted 11/4 with HA/N/V followed by unresponsiveness, CT head with posterior fossa hemorrhage c/b severe IVH with casting of the 4th and 3rd ventricles with hydrocephalus, s/p EVD and SOC, 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 EVD clamped 11/13      NEURO:   neuro checks q1  - EVD Clamped 11/13 @10am, CTH monday  - [id number] for cerebral edema  TCDs  - pain control  - ongoing GOC with family  - monitor for vasospasm, TCDs  Nimodipine  - palliative care consult, family meeting on Monday    PULM:  Intubated for airway protection  - CPAP trials   abg check tonight  - vent bundle  - chest PT    CV:  Afib RVR on admission  - SBP goal 100-200  - cardene PRN to goal  - labetalol 200mg q8h will dc    RENAL:  Baseline Cr 1.24, currently 1.4  - Fluids: 75cc/h LR continue   Na goal >145  Goal euvolemia to net +  - trend lytes  - daily IOs  - FWB 300q6h cont    GI:   Diet: TF  - GI prophylaxis: PPI while intubated  - Bowel regimen - rectal tube    ENDO:   A1c 6.4%  - FS goal 120-180  - HISS  - NPH increase to 26q6     HEME/ONC:  LED negative 11/8  - SCDs  - Chemoppx: SQL    ID:  Fu cultures, NGTD    CODE STATUS:  [x] Full Code [] DNR [] DNI [] Palliative/Comfort Care    DISPOSITION:  [x] ICU [] Stroke Unit [] Floor [] EMU [] RCU [] PCU   60 yo man hx of HTN, DM, admitted 11/4 with HA/N/V followed by unresponsiveness, CT head with posterior fossa hemorrhage c/b severe IVH with casting of the 4th and 3rd ventricles with hydrocephalus, s/p EVD and SOC, 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 EVD clamped 11/13      NEURO:   neuro checks q1  - EVD Clamped 11/13 @10am, CTH monday  - [id number] for cerebral edema  - TCDs, start nimodipine   - pain control  - ongoing GOC with family  - monitor for vasospasm, TCDs  - palliative care consult, family meeting on Monday    PULM:  Intubated for airway protection  - CPAP trials   - abg check tonight  - vent bundle  - chest PT    CV:  Afib RVR on admission  - SBP goal 100-200  - cardene PRN to goal  - labetalol 200mg q8h will dc to allow for HTN/autoregulation     RENAL:  Baseline Cr 1.24  - Fluids: 75cc/h LR continue   - Na goal >145  - Goal euvolemia to net +  - trend lytes  - daily IOs  - FWB 300q6h cont    GI:   Diet: TF  - GI prophylaxis: PPI while intubated  - Bowel regimen - rectal tube    ENDO:   A1c 6.4%  - FS goal 120-180  - HISS  - NPH increase to 26q6     HEME/ONC:  LED negative 11/8  - SCDs  - Chemoppx: SQL    ID:  Fu cultures, NGTD    CODE STATUS:  [x] Full Code [] DNR [] DNI [] Palliative/Comfort Care    DISPOSITION:  [x] ICU [] Stroke Unit [] Floor [] EMU [] RCU [] PCU

## 2021-11-14 LAB
ANION GAP SERPL CALC-SCNC: 10 MMOL/L — SIGNIFICANT CHANGE UP (ref 5–17)
ANION GAP SERPL CALC-SCNC: 11 MMOL/L — SIGNIFICANT CHANGE UP (ref 5–17)
ANION GAP SERPL CALC-SCNC: 9 MMOL/L — SIGNIFICANT CHANGE UP (ref 5–17)
APPEARANCE UR: CLEAR — SIGNIFICANT CHANGE UP
BILIRUB UR-MCNC: NEGATIVE — SIGNIFICANT CHANGE UP
BUN SERPL-MCNC: 40 MG/DL — HIGH (ref 7–23)
BUN SERPL-MCNC: 41 MG/DL — HIGH (ref 7–23)
BUN SERPL-MCNC: 46 MG/DL — HIGH (ref 7–23)
CALCIUM SERPL-MCNC: 8 MG/DL — LOW (ref 8.4–10.5)
CALCIUM SERPL-MCNC: 8.2 MG/DL — LOW (ref 8.4–10.5)
CALCIUM SERPL-MCNC: 8.6 MG/DL — SIGNIFICANT CHANGE UP (ref 8.4–10.5)
CHLORIDE SERPL-SCNC: 107 MMOL/L — SIGNIFICANT CHANGE UP (ref 96–108)
CHLORIDE SERPL-SCNC: 109 MMOL/L — HIGH (ref 96–108)
CHLORIDE SERPL-SCNC: 110 MMOL/L — HIGH (ref 96–108)
CO2 SERPL-SCNC: 25 MMOL/L — SIGNIFICANT CHANGE UP (ref 22–31)
CO2 SERPL-SCNC: 25 MMOL/L — SIGNIFICANT CHANGE UP (ref 22–31)
CO2 SERPL-SCNC: 26 MMOL/L — SIGNIFICANT CHANGE UP (ref 22–31)
COLOR SPEC: SIGNIFICANT CHANGE UP
CREAT SERPL-MCNC: 1.45 MG/DL — HIGH (ref 0.5–1.3)
CREAT SERPL-MCNC: 1.49 MG/DL — HIGH (ref 0.5–1.3)
CREAT SERPL-MCNC: 1.89 MG/DL — HIGH (ref 0.5–1.3)
DIFF PNL FLD: NEGATIVE — SIGNIFICANT CHANGE UP
GAS PNL BLDA: SIGNIFICANT CHANGE UP
GLUCOSE SERPL-MCNC: 128 MG/DL — HIGH (ref 70–99)
GLUCOSE SERPL-MCNC: 150 MG/DL — HIGH (ref 70–99)
GLUCOSE SERPL-MCNC: 150 MG/DL — HIGH (ref 70–99)
GLUCOSE UR QL: ABNORMAL
HCT VFR BLD CALC: 23.4 % — LOW (ref 39–50)
HGB BLD-MCNC: 7.6 G/DL — LOW (ref 13–17)
KETONES UR-MCNC: NEGATIVE — SIGNIFICANT CHANGE UP
LEUKOCYTE ESTERASE UR-ACNC: NEGATIVE — SIGNIFICANT CHANGE UP
MAGNESIUM SERPL-MCNC: 2 MG/DL — SIGNIFICANT CHANGE UP (ref 1.6–2.6)
MAGNESIUM SERPL-MCNC: 2.1 MG/DL — SIGNIFICANT CHANGE UP (ref 1.6–2.6)
MCHC RBC-ENTMCNC: 30.2 PG — SIGNIFICANT CHANGE UP (ref 27–34)
MCHC RBC-ENTMCNC: 32.5 GM/DL — SIGNIFICANT CHANGE UP (ref 32–36)
MCV RBC AUTO: 92.9 FL — SIGNIFICANT CHANGE UP (ref 80–100)
NITRITE UR-MCNC: NEGATIVE — SIGNIFICANT CHANGE UP
NRBC # BLD: 0 /100 WBCS — SIGNIFICANT CHANGE UP (ref 0–0)
PH UR: 6.5 — SIGNIFICANT CHANGE UP (ref 5–8)
PHOSPHATE SERPL-MCNC: 3.4 MG/DL — SIGNIFICANT CHANGE UP (ref 2.5–4.5)
PHOSPHATE SERPL-MCNC: 4 MG/DL — SIGNIFICANT CHANGE UP (ref 2.5–4.5)
PLATELET # BLD AUTO: 247 K/UL — SIGNIFICANT CHANGE UP (ref 150–400)
POTASSIUM SERPL-MCNC: 4.1 MMOL/L — SIGNIFICANT CHANGE UP (ref 3.5–5.3)
POTASSIUM SERPL-MCNC: 4.2 MMOL/L — SIGNIFICANT CHANGE UP (ref 3.5–5.3)
POTASSIUM SERPL-MCNC: 4.6 MMOL/L — SIGNIFICANT CHANGE UP (ref 3.5–5.3)
POTASSIUM SERPL-SCNC: 4.1 MMOL/L — SIGNIFICANT CHANGE UP (ref 3.5–5.3)
POTASSIUM SERPL-SCNC: 4.2 MMOL/L — SIGNIFICANT CHANGE UP (ref 3.5–5.3)
POTASSIUM SERPL-SCNC: 4.6 MMOL/L — SIGNIFICANT CHANGE UP (ref 3.5–5.3)
PROT UR-MCNC: NEGATIVE — SIGNIFICANT CHANGE UP
RBC # BLD: 2.52 M/UL — LOW (ref 4.2–5.8)
RBC # FLD: 13.9 % — SIGNIFICANT CHANGE UP (ref 10.3–14.5)
SODIUM SERPL-SCNC: 143 MMOL/L — SIGNIFICANT CHANGE UP (ref 135–145)
SODIUM SERPL-SCNC: 143 MMOL/L — SIGNIFICANT CHANGE UP (ref 135–145)
SODIUM SERPL-SCNC: 146 MMOL/L — HIGH (ref 135–145)
SP GR SPEC: 1.02 — SIGNIFICANT CHANGE UP (ref 1.01–1.02)
UROBILINOGEN FLD QL: NEGATIVE — SIGNIFICANT CHANGE UP
WBC # BLD: 20.94 K/UL — HIGH (ref 3.8–10.5)
WBC # FLD AUTO: 20.94 K/UL — HIGH (ref 3.8–10.5)

## 2021-11-14 PROCEDURE — 99291 CRITICAL CARE FIRST HOUR: CPT

## 2021-11-14 PROCEDURE — 71045 X-RAY EXAM CHEST 1 VIEW: CPT | Mod: 26

## 2021-11-14 RX ORDER — HUMAN INSULIN 100 [IU]/ML
26 INJECTION, SUSPENSION SUBCUTANEOUS EVERY 6 HOURS
Refills: 0 | Status: DISCONTINUED | OUTPATIENT
Start: 2021-11-14 | End: 2021-11-14

## 2021-11-14 RX ORDER — NIMODIPINE 60 MG/10ML
30 SOLUTION ORAL
Refills: 0 | Status: DISCONTINUED | OUTPATIENT
Start: 2021-11-14 | End: 2021-11-15

## 2021-11-14 RX ORDER — NIMODIPINE 60 MG/10ML
60 SOLUTION ORAL EVERY 4 HOURS
Refills: 0 | Status: DISCONTINUED | OUTPATIENT
Start: 2021-11-14 | End: 2021-11-14

## 2021-11-14 RX ORDER — DOXAZOSIN MESYLATE 4 MG
4 TABLET ORAL AT BEDTIME
Refills: 0 | Status: DISCONTINUED | OUTPATIENT
Start: 2021-11-14 | End: 2021-11-16

## 2021-11-14 RX ORDER — CALCIUM GLUCONATE 100 MG/ML
2 VIAL (ML) INTRAVENOUS ONCE
Refills: 0 | Status: COMPLETED | OUTPATIENT
Start: 2021-11-14 | End: 2021-11-14

## 2021-11-14 RX ORDER — DOXAZOSIN MESYLATE 4 MG
2 TABLET ORAL ONCE
Refills: 0 | Status: COMPLETED | OUTPATIENT
Start: 2021-11-14 | End: 2021-11-14

## 2021-11-14 RX ORDER — CHLORPROMAZINE HCL 10 MG
10 TABLET ORAL ONCE
Refills: 0 | Status: COMPLETED | OUTPATIENT
Start: 2021-11-14 | End: 2021-11-14

## 2021-11-14 RX ORDER — HUMAN INSULIN 100 [IU]/ML
26 INJECTION, SUSPENSION SUBCUTANEOUS EVERY 6 HOURS
Refills: 0 | Status: DISCONTINUED | OUTPATIENT
Start: 2021-11-14 | End: 2021-11-16

## 2021-11-14 RX ORDER — SODIUM CHLORIDE 9 MG/ML
500 INJECTION, SOLUTION INTRAVENOUS ONCE
Refills: 0 | Status: COMPLETED | OUTPATIENT
Start: 2021-11-14 | End: 2021-11-14

## 2021-11-14 RX ADMIN — HUMAN INSULIN 26 UNIT(S): 100 INJECTION, SUSPENSION SUBCUTANEOUS at 17:53

## 2021-11-14 RX ADMIN — Medication 4 MILLIGRAM(S): at 17:52

## 2021-11-14 RX ADMIN — Medication 650 MILLIGRAM(S): at 20:35

## 2021-11-14 RX ADMIN — NIMODIPINE 60 MILLIGRAM(S): 60 SOLUTION ORAL at 06:36

## 2021-11-14 RX ADMIN — NIMODIPINE 30 MILLIGRAM(S): 60 SOLUTION ORAL at 22:26

## 2021-11-14 RX ADMIN — ATORVASTATIN CALCIUM 40 MILLIGRAM(S): 80 TABLET, FILM COATED ORAL at 22:26

## 2021-11-14 RX ADMIN — SODIUM CHLORIDE 500 MILLILITER(S): 9 INJECTION, SOLUTION INTRAVENOUS at 02:08

## 2021-11-14 RX ADMIN — Medication 100.8 MILLIGRAM(S): at 23:23

## 2021-11-14 RX ADMIN — Medication 4: at 06:22

## 2021-11-14 RX ADMIN — Medication 650 MILLIGRAM(S): at 22:39

## 2021-11-14 RX ADMIN — Medication 1 PACKET(S): at 17:53

## 2021-11-14 RX ADMIN — CHLORHEXIDINE GLUCONATE 15 MILLILITER(S): 213 SOLUTION TOPICAL at 18:47

## 2021-11-14 RX ADMIN — NIMODIPINE 30 MILLIGRAM(S): 60 SOLUTION ORAL at 20:22

## 2021-11-14 RX ADMIN — CHLORHEXIDINE GLUCONATE 1 APPLICATION(S): 213 SOLUTION TOPICAL at 22:27

## 2021-11-14 RX ADMIN — CHLORHEXIDINE GLUCONATE 15 MILLILITER(S): 213 SOLUTION TOPICAL at 04:44

## 2021-11-14 RX ADMIN — NIMODIPINE 30 MILLIGRAM(S): 60 SOLUTION ORAL at 18:35

## 2021-11-14 RX ADMIN — NIMODIPINE 60 MILLIGRAM(S): 60 SOLUTION ORAL at 14:16

## 2021-11-14 RX ADMIN — Medication 4 MILLIGRAM(S): at 22:26

## 2021-11-14 RX ADMIN — Medication 200 GRAM(S): at 18:05

## 2021-11-14 RX ADMIN — HUMAN INSULIN 26 UNIT(S): 100 INJECTION, SUSPENSION SUBCUTANEOUS at 11:43

## 2021-11-14 RX ADMIN — ENOXAPARIN SODIUM 40 MILLIGRAM(S): 100 INJECTION SUBCUTANEOUS at 17:53

## 2021-11-14 RX ADMIN — Medication 4 MILLIGRAM(S): at 06:37

## 2021-11-14 RX ADMIN — Medication 4: at 11:43

## 2021-11-14 RX ADMIN — HUMAN INSULIN 26 UNIT(S): 100 INJECTION, SUSPENSION SUBCUTANEOUS at 06:23

## 2021-11-14 RX ADMIN — SODIUM CHLORIDE 75 MILLILITER(S): 9 INJECTION, SOLUTION INTRAVENOUS at 07:46

## 2021-11-14 RX ADMIN — Medication 1 PACKET(S): at 06:37

## 2021-11-14 RX ADMIN — HUMAN INSULIN 26 UNIT(S): 100 INJECTION, SUSPENSION SUBCUTANEOUS at 00:11

## 2021-11-14 RX ADMIN — Medication 12: at 00:07

## 2021-11-14 RX ADMIN — PANTOPRAZOLE SODIUM 40 MILLIGRAM(S): 20 TABLET, DELAYED RELEASE ORAL at 11:28

## 2021-11-14 NOTE — PROGRESS NOTE ADULT - ASSESSMENT
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 EVD clamped 11/13      NEURO:   neuro checks q1  - EVD Clamped 11/13 @10am, CTH monday  - [id number] for cerebral edema  TCDs  - pain control  - ongoing GOC with family  - monitor for vasospasm, TCDs  Nimodipine  - palliative care consult, family meeting on Monday    PULM:  Intubated for airway protection  - CPAP trials   abg check tonight  - vent bundle  - chest PT    CV:  Afib RVR on admission  - SBP goal 100-200  - cardene PRN to goal  - labetalol 200mg q8h will dc    RENAL:  Baseline Cr 1.24, currently 1.4  - Fluids: 75cc/h LR continue   Na goal >145  Goal euvolemia to net +  - trend lytes  - daily IOs  - FWB 300q6h cont    GI:   Diet: TF  - GI prophylaxis: PPI while intubated  - Bowel regimen - rectal tube    ENDO:   A1c 6.4%  - FS goal 120-180  - HISS  - NPH  26q6     HEME/ONC:  LED negative 11/8  - SCDs  - Chemoppx: SQL    ID:  Fu cultures, NGTD    CODE STATUS:  [x] Full Code [] DNR [] DNI [] Palliative/Comfort Care    DISPOSITION:  [x] ICU [] Stroke Unit [] Floor [] EMU [] RCU [] PCU   ASSESSMENT: 62 yo man hx of HTN, DM, admitted 11/4 with HA/N/V followed by unresponsiveness, CT head with posterior fossa hemorrhage c/b severe IVH with casting of the 4th and 3rd ventricles with hydrocephalus, s/p EVD and SOC, 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 EVD clamped 11/13      NEURO:   neuro checks q1  - EVD Clamped 11/13 @10am, CTH monday  - [id number] for cerebral edema  TCDs  - pain control  - ongoing GOC with family  - monitor for vasospasm, TCDs  Nimodipine  - palliative care consult, family meeting on Monday    PULM:  Intubated for airway protection  - CPAP trials   abg check tonight  - vent bundle  - chest PT    CV:  Afib RVR on admission  - SBP goal 100-200  - cardene PRN to goal  - labetalol 200mg q8h will dc    RENAL:  Baseline Cr 1.24, currently 1.4  - Fluids total 125cc/hr   Na goal >145  Goal euvolemia to net +  - trend lytes  - daily IOs  - FWB 300q6h cont    GI:   Diet: TF  - GI prophylaxis: PPI while intubated  - Bowel regimen - rectal tube    ENDO:   A1c 6.4%  - FS goal 120-180  - HISS  - NPH  26q6     HEME/ONC:  LED negative 11/8  - SCDs  - Chemoppx: SQL    ID:  Fu cultures, NGTD    CODE STATUS:  [x] Full Code [] DNR [] DNI [] Palliative/Comfort Care    DISPOSITION:  [x] ICU [] Stroke Unit [] Floor [] EMU [] RCU [] PCU

## 2021-11-14 NOTE — PROGRESS NOTE ADULT - SUBJECTIVE AND OBJECTIVE BOX
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     24 HOUR EVENTS:  Febrile 11/14, cultured.    VITALS/DATA/ORDERS: [x] Reviewed    EXAMINATION:  Intubated, no eye opening, no commands, +overbreathing, +cough/gag, BUE 0/5, BLE TF

## 2021-11-14 NOTE — PROGRESS NOTE ADULT - SUBJECTIVE AND OBJECTIVE BOX
EVENTS: EVD clamped today, CTH in AM monday. pancultured overnight    VITALS:  T(C): , Max: 37.9 (11-13-21 @ 07:00)  HR:  (65 - 89)  BP: --  ABP:  (107/57 - 179/81)  RR:  (12 - 21)  SpO2:  (94% - 100%)  Wt(kg): --  Device: Avea, Mode: AC/ CMV (Assist Control/ Continuous Mandatory Ventilation), RR (machine): 20, TV (machine): 450, FiO2: 30, PEEP: 5, ITime: 1, MAP: 10, PIP: 20    11-12-21 @ 07:01  -  11-13-21 @ 07:00  --------------------------------------------------------  IN: 4840 mL / OUT: 3270 mL / NET: 1570 mL    11-13-21 @ 07:01  -  11-13-21 @ 20:21  --------------------------------------------------------  IN: 1855 mL / OUT: 2375 mL / NET: -520 mL      LABS:  Na: 150 (11-13 @ 06:41), 150 (11-12 @ 19:21), 149 (11-12 @ 12:22), 147 (11-12 @ 06:31), 152 (11-11 @ 21:36), 147 (11-11 @ 18:59), 152 (11-11 @ 11:18), 150 (11-11 @ 05:05), 151 (11-10 @ 23:36)  K: 4.2 (11-13 @ 06:41), 4.0 (11-12 @ 19:21), 4.5 (11-12 @ 12:22), 4.3 (11-12 @ 06:31), 4.5 (11-11 @ 21:36), 4.4 (11-11 @ 18:59), 4.5 (11-11 @ 11:18), 4.3 (11-11 @ 05:05), 4.3 (11-10 @ 23:36)  Cl: 114 (11-13 @ 06:41), 115 (11-12 @ 19:21), 114 (11-12 @ 12:22), 113 (11-12 @ 06:31), 117 (11-11 @ 21:36), 114 (11-11 @ 18:59), 117 (11-11 @ 11:18), 117 (11-11 @ 05:05), 117 (11-10 @ 23:36)  CO2: 26 (11-13 @ 06:41), 25 (11-12 @ 19:21), 23 (11-12 @ 12:22), 25 (11-12 @ 06:31), 24 (11-11 @ 21:36), 23 (11-11 @ 18:59), 23 (11-11 @ 11:18), 22 (11-11 @ 05:05), 22 (11-10 @ 23:36)  BUN: 44 (11-13 @ 06:41), 47 (11-12 @ 19:21), 53 (11-12 @ 12:22), 50 (11-12 @ 06:31), 54 (11-11 @ 21:36), 54 (11-11 @ 18:59), 58 (11-11 @ 11:18), 56 (11-11 @ 05:05), 56 (11-10 @ 23:36)  Cr: 1.42 (11-13 @ 06:41), 1.37 (11-12 @ 19:21), 1.52 (11-12 @ 12:22), 1.47 (11-12 @ 06:31), 1.48 (11-11 @ 21:36), 1.47 (11-11 @ 18:59), 1.53 (11-11 @ 11:18), 1.56 (11-11 @ 05:05), 1.66 (11-10 @ 23:36)  Glu: 115(11-13 @ 06:41), 183(11-12 @ 19:21), 247(11-12 @ 12:22), 254(11-12 @ 06:31), 252(11-11 @ 21:36), 280(11-11 @ 18:59), 245(11-11 @ 11:18), 279(11-11 @ 05:05), 236(11-10 @ 23:36)    Hgb: 7.8 (11-11 @ 21:36), 7.9 (11-10 @ 23:36)  Hct: 24.4 (11-11 @ 21:36), 25.9 (11-10 @ 23:36)  WBC: 14.52 (11-11 @ 21:36), 15.92 (11-10 @ 23:36)  Plt: 249 (11-11 @ 21:36), 222 (11-10 @ 23:36)    INR:   PTT:     MEDICATIONS:  acetaminophen    Suspension .. 650 milliGRAM(s) Oral every 6 hours PRN  atorvastatin 40 milliGRAM(s) Oral at bedtime  chlorhexidine 0.12% Liquid 15 milliLiter(s) Oral Mucosa two times a day  chlorhexidine 4% Liquid 1 Application(s) Topical <User Schedule>  dexAMETHasone  Injectable 4 milliGRAM(s) IV Push every 12 hours  dextrose 50% Injectable 25 Gram(s) IV Push once  dextrose 50% Injectable 12.5 Gram(s) IV Push once  dextrose 50% Injectable 25 Gram(s) IV Push once  enoxaparin Injectable 40 milliGRAM(s) SubCutaneous <User Schedule>  influenza   Vaccine 0.5 milliLiter(s) IntraMuscular once  insulin lispro (ADMELOG) corrective regimen sliding scale   SubCutaneous every 6 hours  insulin NPH human recombinant 23 Unit(s) SubCutaneous every 6 hours  labetalol 200 milliGRAM(s) Oral three times a day  lactated ringers. 1000 milliLiter(s) IV Continuous <Continuous>  pantoprazole  Injectable 40 milliGRAM(s) IV Push daily  psyllium Powder 1 Packet(s) Oral two times a day    EXAMINATION:  General:  calm  HEENT:  MMM  Neuro:  MELVI to central nox, non reactive, +cough/gag ,+overbreathes, no corneals, uppers 0/5, spont LLE, nothing RLE  Cards:  RRR  Respiratory:  no respiratory distress  Abdomen:  soft  Extremities:  no edema       EVENTS: EVD clamped 11/13, CTH in AM monday. pancultured overnight    VITALS:  T(C): , Max: 37.9 (11-13-21 @ 07:00)  HR:  (65 - 89)  BP: --  ABP:  (107/57 - 179/81)  RR:  (12 - 21)  SpO2:  (94% - 100%)  Wt(kg): --  Device: Avea, Mode: AC/ CMV (Assist Control/ Continuous Mandatory Ventilation), RR (machine): 20, TV (machine): 450, FiO2: 30, PEEP: 5, ITime: 1, MAP: 10, PIP: 20    11-12-21 @ 07:01  -  11-13-21 @ 07:00  --------------------------------------------------------  IN: 4840 mL / OUT: 3270 mL / NET: 1570 mL    11-13-21 @ 07:01  -  11-13-21 @ 20:21  --------------------------------------------------------  IN: 1855 mL / OUT: 2375 mL / NET: -520 mL      LABS:  Na: 150 (11-13 @ 06:41), 150 (11-12 @ 19:21), 149 (11-12 @ 12:22), 147 (11-12 @ 06:31), 152 (11-11 @ 21:36), 147 (11-11 @ 18:59), 152 (11-11 @ 11:18), 150 (11-11 @ 05:05), 151 (11-10 @ 23:36)  K: 4.2 (11-13 @ 06:41), 4.0 (11-12 @ 19:21), 4.5 (11-12 @ 12:22), 4.3 (11-12 @ 06:31), 4.5 (11-11 @ 21:36), 4.4 (11-11 @ 18:59), 4.5 (11-11 @ 11:18), 4.3 (11-11 @ 05:05), 4.3 (11-10 @ 23:36)  Cl: 114 (11-13 @ 06:41), 115 (11-12 @ 19:21), 114 (11-12 @ 12:22), 113 (11-12 @ 06:31), 117 (11-11 @ 21:36), 114 (11-11 @ 18:59), 117 (11-11 @ 11:18), 117 (11-11 @ 05:05), 117 (11-10 @ 23:36)  CO2: 26 (11-13 @ 06:41), 25 (11-12 @ 19:21), 23 (11-12 @ 12:22), 25 (11-12 @ 06:31), 24 (11-11 @ 21:36), 23 (11-11 @ 18:59), 23 (11-11 @ 11:18), 22 (11-11 @ 05:05), 22 (11-10 @ 23:36)  BUN: 44 (11-13 @ 06:41), 47 (11-12 @ 19:21), 53 (11-12 @ 12:22), 50 (11-12 @ 06:31), 54 (11-11 @ 21:36), 54 (11-11 @ 18:59), 58 (11-11 @ 11:18), 56 (11-11 @ 05:05), 56 (11-10 @ 23:36)  Cr: 1.42 (11-13 @ 06:41), 1.37 (11-12 @ 19:21), 1.52 (11-12 @ 12:22), 1.47 (11-12 @ 06:31), 1.48 (11-11 @ 21:36), 1.47 (11-11 @ 18:59), 1.53 (11-11 @ 11:18), 1.56 (11-11 @ 05:05), 1.66 (11-10 @ 23:36)  Glu: 115(11-13 @ 06:41), 183(11-12 @ 19:21), 247(11-12 @ 12:22), 254(11-12 @ 06:31), 252(11-11 @ 21:36), 280(11-11 @ 18:59), 245(11-11 @ 11:18), 279(11-11 @ 05:05), 236(11-10 @ 23:36)    Hgb: 7.8 (11-11 @ 21:36), 7.9 (11-10 @ 23:36)  Hct: 24.4 (11-11 @ 21:36), 25.9 (11-10 @ 23:36)  WBC: 14.52 (11-11 @ 21:36), 15.92 (11-10 @ 23:36)  Plt: 249 (11-11 @ 21:36), 222 (11-10 @ 23:36)    INR:   PTT:     MEDICATIONS:  acetaminophen    Suspension .. 650 milliGRAM(s) Oral every 6 hours PRN  atorvastatin 40 milliGRAM(s) Oral at bedtime  chlorhexidine 0.12% Liquid 15 milliLiter(s) Oral Mucosa two times a day  chlorhexidine 4% Liquid 1 Application(s) Topical <User Schedule>  dexAMETHasone  Injectable 4 milliGRAM(s) IV Push every 12 hours  dextrose 50% Injectable 25 Gram(s) IV Push once  dextrose 50% Injectable 12.5 Gram(s) IV Push once  dextrose 50% Injectable 25 Gram(s) IV Push once  enoxaparin Injectable 40 milliGRAM(s) SubCutaneous <User Schedule>  influenza   Vaccine 0.5 milliLiter(s) IntraMuscular once  insulin lispro (ADMELOG) corrective regimen sliding scale   SubCutaneous every 6 hours  insulin NPH human recombinant 23 Unit(s) SubCutaneous every 6 hours  labetalol 200 milliGRAM(s) Oral three times a day  lactated ringers. 1000 milliLiter(s) IV Continuous <Continuous>  pantoprazole  Injectable 40 milliGRAM(s) IV Push daily  psyllium Powder 1 Packet(s) Oral two times a day    EXAMINATION:  General:  calm  HEENT:  MMM  Neuro:  MELVI to nox, non reactive, +cough/gag ,+overbreathes, no corneals, uppers 0/5, spont LLE, nothing RLE  Cards:  RRR  Respiratory:  no respiratory distress  Abdomen:  soft  Extremities:  no edema

## 2021-11-15 LAB
-  AMIKACIN: SIGNIFICANT CHANGE UP
-  AMOXICILLIN/CLAVULANIC ACID: SIGNIFICANT CHANGE UP
-  AMPICILLIN/SULBACTAM: SIGNIFICANT CHANGE UP
-  AMPICILLIN: SIGNIFICANT CHANGE UP
-  AZTREONAM: SIGNIFICANT CHANGE UP
-  CEFAZOLIN: SIGNIFICANT CHANGE UP
-  CEFEPIME: SIGNIFICANT CHANGE UP
-  CEFOXITIN: SIGNIFICANT CHANGE UP
-  CEFTRIAXONE: SIGNIFICANT CHANGE UP
-  CIPROFLOXACIN: SIGNIFICANT CHANGE UP
-  ERTAPENEM: SIGNIFICANT CHANGE UP
-  GENTAMICIN: SIGNIFICANT CHANGE UP
-  IMIPENEM: SIGNIFICANT CHANGE UP
-  LEVOFLOXACIN: SIGNIFICANT CHANGE UP
-  MEROPENEM: SIGNIFICANT CHANGE UP
-  PIPERACILLIN/TAZOBACTAM: SIGNIFICANT CHANGE UP
-  TOBRAMYCIN: SIGNIFICANT CHANGE UP
-  TRIMETHOPRIM/SULFAMETHOXAZOLE: SIGNIFICANT CHANGE UP
ANION GAP SERPL CALC-SCNC: 10 MMOL/L — SIGNIFICANT CHANGE UP (ref 5–17)
ANION GAP SERPL CALC-SCNC: 11 MMOL/L — SIGNIFICANT CHANGE UP (ref 5–17)
ANION GAP SERPL CALC-SCNC: 12 MMOL/L — SIGNIFICANT CHANGE UP (ref 5–17)
APPEARANCE CSF: ABNORMAL
APPEARANCE SPUN FLD: SIGNIFICANT CHANGE UP
APPEARANCE UR: CLEAR — SIGNIFICANT CHANGE UP
APTT BLD: 30.1 SEC — SIGNIFICANT CHANGE UP (ref 27.5–35.5)
BILIRUB UR-MCNC: NEGATIVE — SIGNIFICANT CHANGE UP
BUN SERPL-MCNC: 42 MG/DL — HIGH (ref 7–23)
BUN SERPL-MCNC: 44 MG/DL — HIGH (ref 7–23)
BUN SERPL-MCNC: 49 MG/DL — HIGH (ref 7–23)
CALCIUM SERPL-MCNC: 7.9 MG/DL — LOW (ref 8.4–10.5)
CALCIUM SERPL-MCNC: 8.2 MG/DL — LOW (ref 8.4–10.5)
CALCIUM SERPL-MCNC: 8.2 MG/DL — LOW (ref 8.4–10.5)
CHLORIDE SERPL-SCNC: 106 MMOL/L — SIGNIFICANT CHANGE UP (ref 96–108)
CHLORIDE SERPL-SCNC: 107 MMOL/L — SIGNIFICANT CHANGE UP (ref 96–108)
CHLORIDE SERPL-SCNC: 108 MMOL/L — SIGNIFICANT CHANGE UP (ref 96–108)
CO2 SERPL-SCNC: 24 MMOL/L — SIGNIFICANT CHANGE UP (ref 22–31)
COLOR CSF: ABNORMAL
COLOR SPEC: COLORLESS — SIGNIFICANT CHANGE UP
CREAT ?TM UR-MCNC: 32 MG/DL — SIGNIFICANT CHANGE UP
CREAT SERPL-MCNC: 1.48 MG/DL — HIGH (ref 0.5–1.3)
CREAT SERPL-MCNC: 1.51 MG/DL — HIGH (ref 0.5–1.3)
CREAT SERPL-MCNC: 1.94 MG/DL — HIGH (ref 0.5–1.3)
CULTURE RESULTS: SIGNIFICANT CHANGE UP
DIFF PNL FLD: NEGATIVE — SIGNIFICANT CHANGE UP
GLUCOSE CSF-MCNC: 92 MG/DL — HIGH (ref 40–70)
GLUCOSE SERPL-MCNC: 128 MG/DL — HIGH (ref 70–99)
GLUCOSE SERPL-MCNC: 145 MG/DL — HIGH (ref 70–99)
GLUCOSE SERPL-MCNC: 155 MG/DL — HIGH (ref 70–99)
GLUCOSE UR QL: NEGATIVE — SIGNIFICANT CHANGE UP
GRAM STN FLD: SIGNIFICANT CHANGE UP
INR BLD: 1.01 RATIO — SIGNIFICANT CHANGE UP (ref 0.88–1.16)
KETONES UR-MCNC: NEGATIVE — SIGNIFICANT CHANGE UP
LACTATE CSF-MCNC: 3 MMOL/L — HIGH (ref 1.1–2.4)
LEUKOCYTE ESTERASE UR-ACNC: NEGATIVE — SIGNIFICANT CHANGE UP
MAGNESIUM SERPL-MCNC: 2 MG/DL — SIGNIFICANT CHANGE UP (ref 1.6–2.6)
MAGNESIUM SERPL-MCNC: 2.2 MG/DL — SIGNIFICANT CHANGE UP (ref 1.6–2.6)
METHOD TYPE: SIGNIFICANT CHANGE UP
NEUTROPHILS # CSF: SIGNIFICANT CHANGE UP % (ref 0–6)
NITRITE UR-MCNC: NEGATIVE — SIGNIFICANT CHANGE UP
NRBC NFR CSF: <1 — SIGNIFICANT CHANGE UP (ref 0–5)
ORGANISM # SPEC MICROSCOPIC CNT: SIGNIFICANT CHANGE UP
ORGANISM # SPEC MICROSCOPIC CNT: SIGNIFICANT CHANGE UP
OSMOLALITY UR: 466 MOS/KG — SIGNIFICANT CHANGE UP (ref 300–900)
PH UR: 6 — SIGNIFICANT CHANGE UP (ref 5–8)
PHOSPHATE SERPL-MCNC: 3.6 MG/DL — SIGNIFICANT CHANGE UP (ref 2.5–4.5)
PHOSPHATE SERPL-MCNC: 4.8 MG/DL — HIGH (ref 2.5–4.5)
POTASSIUM SERPL-MCNC: 4.3 MMOL/L — SIGNIFICANT CHANGE UP (ref 3.5–5.3)
POTASSIUM SERPL-MCNC: 4.3 MMOL/L — SIGNIFICANT CHANGE UP (ref 3.5–5.3)
POTASSIUM SERPL-MCNC: 4.5 MMOL/L — SIGNIFICANT CHANGE UP (ref 3.5–5.3)
POTASSIUM SERPL-SCNC: 4.3 MMOL/L — SIGNIFICANT CHANGE UP (ref 3.5–5.3)
POTASSIUM SERPL-SCNC: 4.3 MMOL/L — SIGNIFICANT CHANGE UP (ref 3.5–5.3)
POTASSIUM SERPL-SCNC: 4.5 MMOL/L — SIGNIFICANT CHANGE UP (ref 3.5–5.3)
PROT CSF-MCNC: 14 MG/DL — LOW (ref 15–45)
PROT UR-MCNC: NEGATIVE — SIGNIFICANT CHANGE UP
PROTHROM AB SERPL-ACNC: 12.1 SEC — SIGNIFICANT CHANGE UP (ref 10.6–13.6)
RBC # CSF: 830 /UL — HIGH (ref 0–0)
SODIUM SERPL-SCNC: 142 MMOL/L — SIGNIFICANT CHANGE UP (ref 135–145)
SODIUM UR-SCNC: 128 MMOL/L — SIGNIFICANT CHANGE UP
SP GR SPEC: 1.02 — SIGNIFICANT CHANGE UP (ref 1.01–1.02)
SPECIMEN SOURCE: SIGNIFICANT CHANGE UP
TUBE TYPE: SIGNIFICANT CHANGE UP
UROBILINOGEN FLD QL: NEGATIVE — SIGNIFICANT CHANGE UP
UUN UR-MCNC: 471 MG/DL — SIGNIFICANT CHANGE UP

## 2021-11-15 PROCEDURE — 99223 1ST HOSP IP/OBS HIGH 75: CPT

## 2021-11-15 PROCEDURE — 71045 X-RAY EXAM CHEST 1 VIEW: CPT | Mod: 26

## 2021-11-15 PROCEDURE — 99498 ADVNCD CARE PLAN ADDL 30 MIN: CPT | Mod: 25

## 2021-11-15 PROCEDURE — 99291 CRITICAL CARE FIRST HOUR: CPT

## 2021-11-15 PROCEDURE — 93970 EXTREMITY STUDY: CPT | Mod: 26

## 2021-11-15 PROCEDURE — 70450 CT HEAD/BRAIN W/O DYE: CPT | Mod: 26,59

## 2021-11-15 PROCEDURE — 99497 ADVNCD CARE PLAN 30 MIN: CPT | Mod: 25

## 2021-11-15 RX ORDER — SODIUM CHLORIDE 9 MG/ML
1000 INJECTION, SOLUTION INTRAVENOUS
Refills: 0 | Status: DISCONTINUED | OUTPATIENT
Start: 2021-11-15 | End: 2021-11-17

## 2021-11-15 RX ORDER — DEXAMETHASONE 0.5 MG/5ML
3 ELIXIR ORAL EVERY 12 HOURS
Refills: 0 | Status: DISCONTINUED | OUTPATIENT
Start: 2021-11-15 | End: 2021-11-16

## 2021-11-15 RX ORDER — HEPARIN SODIUM 5000 [USP'U]/ML
5000 INJECTION INTRAVENOUS; SUBCUTANEOUS EVERY 8 HOURS
Refills: 0 | Status: DISCONTINUED | OUTPATIENT
Start: 2021-11-15 | End: 2021-11-15

## 2021-11-15 RX ORDER — VANCOMYCIN HCL 1 G
1250 VIAL (EA) INTRAVENOUS EVERY 24 HOURS
Refills: 0 | Status: COMPLETED | OUTPATIENT
Start: 2021-11-15 | End: 2021-11-15

## 2021-11-15 RX ORDER — SODIUM CHLORIDE 9 MG/ML
500 INJECTION, SOLUTION INTRAVENOUS ONCE
Refills: 0 | Status: COMPLETED | OUTPATIENT
Start: 2021-11-15 | End: 2021-11-15

## 2021-11-15 RX ORDER — CEFEPIME 1 G/1
INJECTION, POWDER, FOR SOLUTION INTRAMUSCULAR; INTRAVENOUS
Refills: 0 | Status: DISCONTINUED | OUTPATIENT
Start: 2021-11-15 | End: 2021-11-15

## 2021-11-15 RX ORDER — CEFEPIME 1 G/1
2000 INJECTION, POWDER, FOR SOLUTION INTRAMUSCULAR; INTRAVENOUS EVERY 12 HOURS
Refills: 0 | Status: DISCONTINUED | OUTPATIENT
Start: 2021-11-15 | End: 2021-11-17

## 2021-11-15 RX ORDER — FENTANYL CITRATE 50 UG/ML
50 INJECTION INTRAVENOUS ONCE
Refills: 0 | Status: DISCONTINUED | OUTPATIENT
Start: 2021-11-15 | End: 2021-11-15

## 2021-11-15 RX ORDER — CEFEPIME 1 G/1
INJECTION, POWDER, FOR SOLUTION INTRAMUSCULAR; INTRAVENOUS
Refills: 0 | Status: DISCONTINUED | OUTPATIENT
Start: 2021-11-15 | End: 2021-11-17

## 2021-11-15 RX ORDER — CEFEPIME 1 G/1
2000 INJECTION, POWDER, FOR SOLUTION INTRAMUSCULAR; INTRAVENOUS ONCE
Refills: 0 | Status: COMPLETED | OUTPATIENT
Start: 2021-11-15 | End: 2021-11-15

## 2021-11-15 RX ADMIN — SODIUM CHLORIDE 1000 MILLILITER(S): 9 INJECTION, SOLUTION INTRAVENOUS at 00:49

## 2021-11-15 RX ADMIN — CEFEPIME 100 MILLIGRAM(S): 1 INJECTION, POWDER, FOR SOLUTION INTRAMUSCULAR; INTRAVENOUS at 22:33

## 2021-11-15 RX ADMIN — Medication 4 MILLIGRAM(S): at 06:02

## 2021-11-15 RX ADMIN — HUMAN INSULIN 26 UNIT(S): 100 INJECTION, SUSPENSION SUBCUTANEOUS at 06:03

## 2021-11-15 RX ADMIN — Medication 650 MILLIGRAM(S): at 18:00

## 2021-11-15 RX ADMIN — SODIUM CHLORIDE 1000 MILLILITER(S): 9 INJECTION, SOLUTION INTRAVENOUS at 00:23

## 2021-11-15 RX ADMIN — CHLORHEXIDINE GLUCONATE 1 APPLICATION(S): 213 SOLUTION TOPICAL at 23:27

## 2021-11-15 RX ADMIN — Medication 1 PACKET(S): at 17:51

## 2021-11-15 RX ADMIN — HUMAN INSULIN 26 UNIT(S): 100 INJECTION, SUSPENSION SUBCUTANEOUS at 17:49

## 2021-11-15 RX ADMIN — FENTANYL CITRATE 50 MICROGRAM(S): 50 INJECTION INTRAVENOUS at 09:00

## 2021-11-15 RX ADMIN — FENTANYL CITRATE 50 MICROGRAM(S): 50 INJECTION INTRAVENOUS at 09:30

## 2021-11-15 RX ADMIN — Medication 3 MILLIGRAM(S): at 17:51

## 2021-11-15 RX ADMIN — NIMODIPINE 30 MILLIGRAM(S): 60 SOLUTION ORAL at 08:05

## 2021-11-15 RX ADMIN — NIMODIPINE 30 MILLIGRAM(S): 60 SOLUTION ORAL at 09:36

## 2021-11-15 RX ADMIN — CEFEPIME 100 MILLIGRAM(S): 1 INJECTION, POWDER, FOR SOLUTION INTRAMUSCULAR; INTRAVENOUS at 11:14

## 2021-11-15 RX ADMIN — CHLORHEXIDINE GLUCONATE 15 MILLILITER(S): 213 SOLUTION TOPICAL at 06:02

## 2021-11-15 RX ADMIN — Medication 650 MILLIGRAM(S): at 16:22

## 2021-11-15 RX ADMIN — Medication 4 MILLIGRAM(S): at 22:33

## 2021-11-15 RX ADMIN — HUMAN INSULIN 26 UNIT(S): 100 INJECTION, SUSPENSION SUBCUTANEOUS at 12:10

## 2021-11-15 RX ADMIN — PANTOPRAZOLE SODIUM 40 MILLIGRAM(S): 20 TABLET, DELAYED RELEASE ORAL at 11:14

## 2021-11-15 RX ADMIN — NIMODIPINE 30 MILLIGRAM(S): 60 SOLUTION ORAL at 06:03

## 2021-11-15 RX ADMIN — Medication 4: at 06:03

## 2021-11-15 RX ADMIN — HUMAN INSULIN 26 UNIT(S): 100 INJECTION, SUSPENSION SUBCUTANEOUS at 23:27

## 2021-11-15 RX ADMIN — Medication 166.67 MILLIGRAM(S): at 09:34

## 2021-11-15 RX ADMIN — Medication 650 MILLIGRAM(S): at 04:29

## 2021-11-15 RX ADMIN — Medication 4: at 00:31

## 2021-11-15 RX ADMIN — SODIUM CHLORIDE 100 MILLILITER(S): 9 INJECTION, SOLUTION INTRAVENOUS at 22:32

## 2021-11-15 RX ADMIN — ATORVASTATIN CALCIUM 40 MILLIGRAM(S): 80 TABLET, FILM COATED ORAL at 22:33

## 2021-11-15 RX ADMIN — SODIUM CHLORIDE 100 MILLILITER(S): 9 INJECTION, SOLUTION INTRAVENOUS at 11:15

## 2021-11-15 RX ADMIN — CHLORHEXIDINE GLUCONATE 15 MILLILITER(S): 213 SOLUTION TOPICAL at 17:52

## 2021-11-15 RX ADMIN — NIMODIPINE 30 MILLIGRAM(S): 60 SOLUTION ORAL at 04:30

## 2021-11-15 RX ADMIN — HUMAN INSULIN 26 UNIT(S): 100 INJECTION, SUSPENSION SUBCUTANEOUS at 00:32

## 2021-11-15 NOTE — PROGRESS NOTE ADULT - SUBJECTIVE AND OBJECTIVE BOX
SUBJECTIVE AND OBJECTIVE:  INTERVAL HPI/OVERNIGHT EVENTS:  Patient remains unresponsive with poor prognosis     DNR on chart:   Allergies    penicillin (Other)    Intolerances    MEDICATIONS  (STANDING):  atorvastatin 40 milliGRAM(s) Oral at bedtime  cefepime   IVPB      cefepime   IVPB 2000 milliGRAM(s) IV Intermittent every 12 hours  chlorhexidine 0.12% Liquid 15 milliLiter(s) Oral Mucosa two times a day  chlorhexidine 4% Liquid 1 Application(s) Topical <User Schedule>  dexAMETHasone  Injectable 3 milliGRAM(s) IV Push every 12 hours  dextrose 50% Injectable 25 Gram(s) IV Push once  dextrose 50% Injectable 12.5 Gram(s) IV Push once  dextrose 50% Injectable 25 Gram(s) IV Push once  doxazosin 4 milliGRAM(s) Oral at bedtime  influenza   Vaccine 0.5 milliLiter(s) IntraMuscular once  insulin lispro (ADMELOG) corrective regimen sliding scale   SubCutaneous every 6 hours  insulin NPH human recombinant 26 Unit(s) SubCutaneous every 6 hours  lactated ringers. 1000 milliLiter(s) (100 mL/Hr) IV Continuous <Continuous>  pantoprazole  Injectable 40 milliGRAM(s) IV Push daily  psyllium Powder 1 Packet(s) Oral two times a day    MEDICATIONS  (PRN):  acetaminophen    Suspension .. 650 milliGRAM(s) Oral every 6 hours PRN Temp greater or equal to 38C (100.4F), Mild Pain (1 - 3)      ITEMS UNCHECKED ARE NOT PRESENT    PRESENT SYMPTOMS: [ x]Unable to obtain due to poor mentation   Source if other than patient:  [ ]Family   [ ]Team     Pain:  [ ]yes [ ]no  QOL impact -   Location -                    Aggravating factors -  Quality -  Radiation -  Timing-  Severity (0-10 scale):  Minimal acceptable level (0-10 scale):     Dyspnea:                           [ ]Mild [ ]Moderate [ ]Severe  Anxiety:                             [ ]Mild [ ]Moderate [ ]Severe  Fatigue:                             [ ]Mild [ ]Moderate [ ]Severe  Nausea:                             [ ]Mild [ ]Moderate [ ]Severe  Loss of appetite:              [ ]Mild [ ]Moderate [ ]Severe  Constipation:                    [ ]Mild [ ]Moderate [ ]Severe    PAIN AD Score:	0  [email or web address] (Ctrl + left click to view)    Other Symptoms:  [ ]All other review of systems negative     Palliative Performance Status Version 2:    10     %      http://npcrc.org/files/news/palliative_performance_scale_[email or web address].pdf  PHYSICAL EXAM:  Vital Signs Last 24 Hrs  T(C): 36.6 (15 Nov 2021 11:00), Max: 38.4 (2021 23:00)  T(F): 97.9 (15 Nov 2021 11:00), Max: 101.2 (2021 23:00)  HR: 82 (15 Nov 2021 14:36) (70 - 89)  BP: 123/65 (15 Nov 2021 07:00) (100/49 - 159/78)  BP(mean): 81 (15 Nov 2021 07:00) (64 - 100)  RR: 13 (15 Nov 2021 12:00) (11 - 21)  SpO2: 100% (15 Nov 2021 14:36) (97% - 100%) I&O's Summary    2021 07:01  -  15 Nov 2021 07:00  --------------------------------------------------------  IN: 4920 mL / OUT: 3150 mL / NET: 1770 mL    15 Nov 2021 07:01  -  15 Nov 2021 15:01  --------------------------------------------------------  IN: 1510 mL / OUT: 1475 mL / NET: 35 mL       GENERAL:  [ ]Alert  [ ]Oriented x   [ ]Lethargic  [ ]Cachexia  [x ]Unarousable  [ ]Verbal  [ x]Non-Verbal  Behavioral:   [ ]Anxiety  [ ]Delirium [ ]Agitation [ ]Other  HEENT:  [ ]Normal   [ ]Dry mouth   [x ]ET Tube/Trach  [ ]Oral lesions  PULMONARY:   [ ]Clear [ ]Tachypnea  [ ]Audible excessive secretions   [ ]Rhonchi        [ ]Right [ ]Left [ ]Bilateral  [ ]Crackles        [ ]Right [ ]Left [ ]Bilateral  [ ]Wheezing     [ ]Right [ ]Left [ ]Bilateral  [ ]Diminished BS [ ] Right [ ]Left [ ]Bilateral  CARDIOVASCULAR:    [ ]Regular x[ ]Irregular [ ]Tachy  [ ]Bridger [ ]Murmur [ ]Other  GASTROINTESTINAL:  [ ]Soft  [ ]Distended   [ x]+BS  [ ]Non tender [ ]Tender  [ ]PEG [ ]OGT/ NGT   Last BM:    GENITOURINARY:  [ ]Normal [ ]Incontinent   [ ]Oliguria/Anuria   [ x]Barney  MUSCULOSKELETAL:   [ ]Normal   [ ]Weakness  [x ]Bed/Wheelchair bound [ ]Edema  NEUROLOGIC:   [ ]No focal deficits  [ x] Cognitive impairment  [ ] Dysphagia [ ]Dysarthria [ ] Paresis [ ]Other   SKIN:   [ ]Normal  [ ]Rash   [ ]Pressure ulcer(s) [ ]y [ ]n present on admission    CRITICAL CARE:  [ ]Shock Present  [ ]Septic [ ]Cardiogenic [x ]Neurologic [ ]Hypovolemic  [ ]Vasopressors [ ]Inotropes  x[ x]xRespiratory failure present [x ]Mechanical Ventilation [ ]Non-invasive ventilatory support [ ]High-Flow  x[ ]Acute  [ ]Chronic [x]Hypoxic  [x ]Hypercarbic [ ]Other  [ ]Other organ failure     LABS:                        7.6    20.94 )-----------( 247      ( 2021 21:19 )             23.4   11-15    142  |  107  |  44<H>  ----------------------------<  145<H>  4.5   |  24  |  1.51<H>    Ca    8.2<L>      15 Nov 2021 11:16  Phos  4.8     11-15  Mg     2.2     11-15    PT/INR - ( 15 Nov 2021 11:16 )   PT: 12.1 sec;   INR: 1.01 ratio         PTT - ( 15 Nov 2021 11:16 )  PTT:30.1 sec    Urinalysis Basic - ( 15 Nov 2021 08:44 )    Color: Colorless / Appearance: Clear / S.016 / pH: x  Gluc: x / Ketone: Negative  / Bili: Negative / Urobili: Negative   Blood: x / Protein: Negative / Nitrite: Negative   Leuk Esterase: Negative / RBC: x / WBC x   Sq Epi: x / Non Sq Epi: x / Bacteria: x      RADIOLOGY & ADDITIONAL STUDIES:    < from: CT Head No Cont (11.15.21 @ 09:20) >    EXAM:  CT BRAIN                            PROCEDURE DATE:  11/15/2021            INTERPRETATION:  Noncontrast CT of the brain.    CLINICAL INDICATION:  ICH    TECHNIQUE : Axial CT scanning of the brain was obtained from the skull base to the vertex without the administration of intravenous contrast. Sagittal and coronal reformats were provided.    COMPARISON: CT brain 2021    FINDINGS:    Redemonstration of right frontal approach ventriculostomy catheter in unchanged position.    Similarintraventricular hemorrhage, overall mild in degree. Similar mild ventricular dilatation.    Redemonstration of midline suboccipital craniectomy. Redemonstration of resolving vermian hemorrhage as well as vermian and cerebellar edema.    Similar right lateral convexity low-density subdural collection measuring 6 mm in greatest depth.    IMPRESSION:    No significant interval change from 2021.    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 Coronal and sagittal computer generated reconstructed views are available.    Comparison is made with the prior CT of 11/10/2021 in the OR and the CTA of 2021 and the conventional angiogram of 2021.    There has been a suboccipital craniectomy. There is hemorrhage in the fourth ventricle and cerebellar vermis with intraventricular hemorrhage extending into the third and lateral ventricles. A right frontal ventricular catheter is identified with the tip in the third ventricle. Moderate hydrocephalus is unchanged. Since the prior exam there is  lucency in the left medial cerebellum which may be related to PICA infarct versus postoperative changes.            After the intravenous power injection of 70 cc of Omnipaque 300 using a bolus berenice timing run serial thin sections were obtained through the intracranial circulation centered at the [id number] on a  multislice CT scanner reformatted with coronal and sagittal 2 D-MIP projections, including 3 D reconstructions using a separate 3D [employer] software workstation.A total of 70  cc of Omnipaque were intravenously injected.  30 cc were discarded.       The left vertebral artery is dominant. The left PICA is visualized although the distal PICA is not identified. No focal PICA aneurysm is seen. The right vertebral artery is normal. The basilar artery is normal.The posterior cerebral and superior cerebellar arteries are normal.    Evaluation of the carotid arteries demonstrate normal appearance cervical, petrous cavernous and supraclinoid internal carotid arteries. The anterior cerebral arteries anterior communicating artery and middle cerebral arteries are normal.      The normal intracranial venous circulation is identified. The transverse sinuses are codominant. The superior sagittal sinus, internal cerebral veins, vein of Zeeshan, straight sinus, transverse sinuses, sigmoid sinuses and internal jugular veins are normal. Cortical veins are normal.        IMPRESSION: Suboccipital craniectomy there are new postoperative changes versus infarct in the left medial cerebellum in the posterior inferior cerebellar artery territory. Intraventricular hemorrhage and hydrocephalus is unchanged since 2021. 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 The proximal PICA is visualized.    --- Endof Report ---              < end of copied text >      Protein Calorie Malnutrition Present: [ ]mild [ ]moderate [ ]severe [ ]underweight [ ]morbid obesity  https://www.andeal.org/vault/2440/web/files/ONC/Table_Clinical%20Characteristics%20to%20Document%20Malnutrition-White%20JV%20et%20al%2020.pdf    Height (cm): 177.8 (11-10-21 @ 14:00)  Weight (kg): 86.8 (11-10-21 @ 14:00)  BMI (kg/m2): 27.5 (11-10-21 @ 14:00)    [ ]PPSV2 < or = 30%  [ ]significant weight loss [ ]poor nutritional intake [ ]anasarca    [ ]Artificial Nutrition    REFERRALS:   [x ]Chaplaincy  [ ]Hospice  [ ]Child Life  [ ]xSocial Work  [ ]Case management [ ]Holistic Therapy     Goals of Care Document:

## 2021-11-15 NOTE — PROGRESS NOTE ADULT - SUBJECTIVE AND OBJECTIVE BOX
EVENTS: EVD clamped 11/13, CTH in AM monday. pancultured overnight    ICU Vital Signs Last 24 Hrs  T(C): 37.1 (15 Nov 2021 06:00), Max: 38.4 (14 Nov 2021 23:00)  T(F): 98.8 (15 Nov 2021 06:00), Max: 101.2 (14 Nov 2021 23:00)  HR: 78 (15 Nov 2021 06:00) (70 - 89)  BP: 129/63 (15 Nov 2021 06:00) (100/49 - 159/78)  BP(mean): 80 (15 Nov 2021 06:00) (64 - 100)  ABP: 147/64 (15 Nov 2021 06:00) (88/41 - 184/68)  ABP(mean): 85 (15 Nov 2021 06:00) (54 - 98)  RR: 20 (15 Nov 2021 06:00) (13 - 21)  SpO2: 100% (15 Nov 2021 06:00) (96% - 100%)      11-13-21 @ 07:01  -  11-14-21 @ 07:00  --------------------------------------------------------  IN: 5045 mL / OUT: 3825 mL / NET: 1220 mL    11-14-21 @ 07:01  -  11-15-21 @ 06:52  --------------------------------------------------------  IN: 4920 mL / OUT: 3150 mL / NET: 1770 mL        Mode: AC/ CMV (Assist Control/ Continuous Mandatory Ventilation), RR (machine): 16, TV (machine): 450, FiO2: 30, PEEP: 5, ITime: 1, MAP: 8, PIP: 15    acetaminophen    Suspension .. 650 milliGRAM(s) Oral every 6 hours PRN  atorvastatin 40 milliGRAM(s) Oral at bedtime  chlorhexidine 0.12% Liquid 15 milliLiter(s) Oral Mucosa two times a day  chlorhexidine 4% Liquid 1 Application(s) Topical <User Schedule>  dexAMETHasone  Injectable 3 milliGRAM(s) IV Push every 12 hours  dextrose 50% Injectable 25 Gram(s) IV Push once  dextrose 50% Injectable 12.5 Gram(s) IV Push once  dextrose 50% Injectable 25 Gram(s) IV Push once  doxazosin 4 milliGRAM(s) Oral at bedtime  enoxaparin Injectable 40 milliGRAM(s) SubCutaneous <User Schedule>  influenza   Vaccine 0.5 milliLiter(s) IntraMuscular once  insulin lispro (ADMELOG) corrective regimen sliding scale   SubCutaneous every 6 hours  insulin NPH human recombinant 26 Unit(s) SubCutaneous every 6 hours  lactated ringers. 1000 milliLiter(s) (45 mL/Hr) IV Continuous <Continuous>  niMODipine Oral Solution 30 milliGRAM(s) Enteral Tube every 2 hours  pantoprazole  Injectable 40 milliGRAM(s) IV Push daily  psyllium Powder 1 Packet(s) Oral two times a day      LABS:  Na: 142 (11-15 @ 03:25), 143 (11-14 @ 21:19), 143 (11-14 @ 15:16), 146 (11-14 @ 07:03), 145 (11-13 @ 20:54), 150 (11-13 @ 06:41), 150 (11-12 @ 19:21), 149 (11-12 @ 12:22)  K: 4.3 (11-15 @ 03:25), 4.6 (11-14 @ 21:19), 4.2 (11-14 @ 15:16), 4.1 (11-14 @ 07:03), 4.5 (11-13 @ 20:54), 4.2 (11-13 @ 06:41), 4.0 (11-12 @ 19:21), 4.5 (11-12 @ 12:22)  Cl: 106 (11-15 @ 03:25), 107 (11-14 @ 21:19), 109 (11-14 @ 15:16), 110 (11-14 @ 07:03), 110 (11-13 @ 20:54), 114 (11-13 @ 06:41), 115 (11-12 @ 19:21), 114 (11-12 @ 12:22)  CO2: 24 (11-15 @ 03:25), 25 (11-14 @ 21:19), 25 (11-14 @ 15:16), 26 (11-14 @ 07:03), 25 (11-13 @ 20:54), 26 (11-13 @ 06:41), 25 (11-12 @ 19:21), 23 (11-12 @ 12:22)  BUN: 49 (11-15 @ 03:25), 46 (11-14 @ 21:19), 40 (11-14 @ 15:16), 41 (11-14 @ 07:03), 44 (11-13 @ 20:54), 44 (11-13 @ 06:41), 47 (11-12 @ 19:21), 53 (11-12 @ 12:22)  Cr: 1.94 (11-15 @ 03:25), 1.89 (11-14 @ 21:19), 1.45 (11-14 @ 15:16), 1.49 (11-14 @ 07:03), 1.41 (11-13 @ 20:54), 1.42 (11-13 @ 06:41), 1.37 (11-12 @ 19:21), 1.52 (11-12 @ 12:22)  Glu: 155(11-15 @ 03:25), 128(11-14 @ 21:19), 150(11-14 @ 15:16), 150(11-14 @ 07:03), 230(11-13 @ 20:54), 115(11-13 @ 06:41), 183(11-12 @ 19:21), 247(11-12 @ 12:22)    Hgb: 7.6 (11-14 @ 21:19), 7.7 (11-13 @ 20:55)  Hct: 23.4 (11-14 @ 21:19), 24.3 (11-13 @ 20:55)  WBC: 20.94 (11-14 @ 21:19), 15.57 (11-13 @ 20:55)  Plt: 247 (11-14 @ 21:19), 252 (11-13 @ 20:55)    ABG - ( 14 Nov 2021 01:56 )  pH, Arterial: 7.56  pH, Blood: x     /  pCO2: 31    /  pO2: 179   / HCO3: 28    / Base Excess: 5.4   /  SaO2: 100.0     Culture - CSF with Gram Stain (collected 11-15-21 @ 06:11)  Source: .CSF CSF  Gram Stain (11-15-21 @ 06:39):    polymorphonuclear leukocytes seen    No organisms seen    by cytocentrifuge          EXAMINATION:  General:  calm  HEENT:  MMM  Neuro:  MELVI to nox, non reactive, +cough/gag ,+overbreathes, no corneals, uppers 0/5, spont LLE, nothing RLE  Cards:  RRR  Respiratory:  no respiratory distress  Abdomen:  soft  Extremities:  no edema       EVENTS: febrile, continues on clamp trail, apenic on CPAP     ICU Vital Signs Last 24 Hrs  T(C): 37.1 (15 Nov 2021 06:00), Max: 38.4 (14 Nov 2021 23:00)  T(F): 98.8 (15 Nov 2021 06:00), Max: 101.2 (14 Nov 2021 23:00)  HR: 78 (15 Nov 2021 06:00) (70 - 89)  BP: 129/63 (15 Nov 2021 06:00) (100/49 - 159/78)  BP(mean): 80 (15 Nov 2021 06:00) (64 - 100)  ABP: 147/64 (15 Nov 2021 06:00) (88/41 - 184/68)  ABP(mean): 85 (15 Nov 2021 06:00) (54 - 98)  RR: 20 (15 Nov 2021 06:00) (13 - 21)  SpO2: 100% (15 Nov 2021 06:00) (96% - 100%)      11-13-21 @ 07:01  -  11-14-21 @ 07:00  --------------------------------------------------------  IN: 5045 mL / OUT: 3825 mL / NET: 1220 mL    11-14-21 @ 07:01  -  11-15-21 @ 06:52  --------------------------------------------------------  IN: 4920 mL / OUT: 3150 mL / NET: 1770 mL        Mode: AC/ CMV (Assist Control/ Continuous Mandatory Ventilation), RR (machine): 16, TV (machine): 450, FiO2: 30, PEEP: 5, ITime: 1, MAP: 8, PIP: 15    acetaminophen    Suspension .. 650 milliGRAM(s) Oral every 6 hours PRN  atorvastatin 40 milliGRAM(s) Oral at bedtime  chlorhexidine 0.12% Liquid 15 milliLiter(s) Oral Mucosa two times a day  chlorhexidine 4% Liquid 1 Application(s) Topical <User Schedule>  dexAMETHasone  Injectable 3 milliGRAM(s) IV Push every 12 hours  dextrose 50% Injectable 25 Gram(s) IV Push once  dextrose 50% Injectable 12.5 Gram(s) IV Push once  dextrose 50% Injectable 25 Gram(s) IV Push once  doxazosin 4 milliGRAM(s) Oral at bedtime  enoxaparin Injectable 40 milliGRAM(s) SubCutaneous <User Schedule>  influenza   Vaccine 0.5 milliLiter(s) IntraMuscular once  insulin lispro (ADMELOG) corrective regimen sliding scale   SubCutaneous every 6 hours  insulin NPH human recombinant 26 Unit(s) SubCutaneous every 6 hours  lactated ringers. 1000 milliLiter(s) (45 mL/Hr) IV Continuous <Continuous>  niMODipine Oral Solution 30 milliGRAM(s) Enteral Tube every 2 hours  pantoprazole  Injectable 40 milliGRAM(s) IV Push daily  psyllium Powder 1 Packet(s) Oral two times a day      LABS:  Na: 142 (11-15 @ 03:25), 143 (11-14 @ 21:19), 143 (11-14 @ 15:16), 146 (11-14 @ 07:03), 145 (11-13 @ 20:54), 150 (11-13 @ 06:41), 150 (11-12 @ 19:21), 149 (11-12 @ 12:22)  K: 4.3 (11-15 @ 03:25), 4.6 (11-14 @ 21:19), 4.2 (11-14 @ 15:16), 4.1 (11-14 @ 07:03), 4.5 (11-13 @ 20:54), 4.2 (11-13 @ 06:41), 4.0 (11-12 @ 19:21), 4.5 (11-12 @ 12:22)  Cl: 106 (11-15 @ 03:25), 107 (11-14 @ 21:19), 109 (11-14 @ 15:16), 110 (11-14 @ 07:03), 110 (11-13 @ 20:54), 114 (11-13 @ 06:41), 115 (11-12 @ 19:21), 114 (11-12 @ 12:22)  CO2: 24 (11-15 @ 03:25), 25 (11-14 @ 21:19), 25 (11-14 @ 15:16), 26 (11-14 @ 07:03), 25 (11-13 @ 20:54), 26 (11-13 @ 06:41), 25 (11-12 @ 19:21), 23 (11-12 @ 12:22)  BUN: 49 (11-15 @ 03:25), 46 (11-14 @ 21:19), 40 (11-14 @ 15:16), 41 (11-14 @ 07:03), 44 (11-13 @ 20:54), 44 (11-13 @ 06:41), 47 (11-12 @ 19:21), 53 (11-12 @ 12:22)  Cr: 1.94 (11-15 @ 03:25), 1.89 (11-14 @ 21:19), 1.45 (11-14 @ 15:16), 1.49 (11-14 @ 07:03), 1.41 (11-13 @ 20:54), 1.42 (11-13 @ 06:41), 1.37 (11-12 @ 19:21), 1.52 (11-12 @ 12:22)  Glu: 155(11-15 @ 03:25), 128(11-14 @ 21:19), 150(11-14 @ 15:16), 150(11-14 @ 07:03), 230(11-13 @ 20:54), 115(11-13 @ 06:41), 183(11-12 @ 19:21), 247(11-12 @ 12:22)    Hgb: 7.6 (11-14 @ 21:19), 7.7 (11-13 @ 20:55)  Hct: 23.4 (11-14 @ 21:19), 24.3 (11-13 @ 20:55)  WBC: 20.94 (11-14 @ 21:19), 15.57 (11-13 @ 20:55)  Plt: 247 (11-14 @ 21:19), 252 (11-13 @ 20:55)    ABG - ( 14 Nov 2021 01:56 )  pH, Arterial: 7.56  pH, Blood: x     /  pCO2: 31    /  pO2: 179   / HCO3: 28    / Base Excess: 5.4   /  SaO2: 100.0     Culture - CSF with Gram Stain (collected 11-15-21 @ 06:11)  Source: .CSF CSF  Gram Stain (11-15-21 @ 06:39):    polymorphonuclear leukocytes seen    No organisms seen    by cytocentrifuge          EXAMINATION:  General:  calm  HEENT:  MMM  Neuro:  MELVI to nox, non reactive, +cough/gag ,+overbreathes, no corneals, uppers 0/5, spont LLE, nothing RLE  Cards:  RRR  Respiratory:  no respiratory distress  Abdomen:  soft  Extremities:  no edema

## 2021-11-15 NOTE — PROGRESS NOTE ADULT - ASSESSMENT
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 EVD clamped 11/13      NEURO:   neuro checks q1  - EVD Clamped 11/13 @10am, CTH monday  - [id number] for cerebral edema  TCDs  - pain control  - ongoing GOC with family  - monitor for vasospasm, TCDs  Nimodipine  - palliative care consult, family meeting on Monday    PULM:  Intubated for airway protection  - CPAP trials   abg check tonight  - vent bundle  - chest PT    CV:  Afib RVR on admission  - SBP goal 100-200  - cardene PRN to goal  - labetalol 200mg q8h will dc    RENAL:  Baseline Cr 1.24, currently 1.4  - Fluids total 125cc/hr   Na goal >145  Goal euvolemia to net +  - trend lytes  - daily IOs  - FWB 300q6h cont    GI:   Diet: TF  - GI prophylaxis: PPI while intubated  - Bowel regimen - rectal tube    ENDO:   A1c 6.4%  - FS goal 120-180  - HISS  - NPH  26q6     HEME/ONC:  LED negative 11/8  - SCDs  - Chemoppx: SQL    ID:  Fu cultures, [id number]    CODE STATUS:  [x] Full Code [] DNR [] DNI [] Palliative/Comfort Care    DISPOSITION:  [x] ICU [] Stroke Unit [] Floor [] EMU [] RCU [] PCU   ASSESSMENT: 62 yo man hx of HTN, DM, admitted 11/4 with HA/N/V followed by unresponsiveness, CT head with posterior fossa hemorrhage c/b severe IVH with casting of the 4th and 3rd ventricles with hydrocephalus, s/p EVD and SOC, 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 EVD clamped 11/13      NEURO:   neuro checks q1  - EVD Clamped 11/13 @10am, CTH monday  - [id number] for cerebral edema  TCDs  - pain control  - ongoing GOC with family  - monitor for vasospasm, TCDs  - d/c Nimodipine, d/c TCDs   - palliative care consult, family meeting on Monday    PULM:  Intubated for airway protection  - CPAP trials   - vent bundle  - chest PT    CV:  Afib RVR on admission  - SBP goal 100-200  - cardene PRN to goal  - labetalol 200mg q8h will dc    RENAL:  Baseline Cr 1.24, currently 1.4  Na goal >145  Goal euvolemia to net +  - trend lytes  - daily IOs  - FWB 300q6h cont    GI:   Diet: TF  - GI prophylaxis: PPI while intubated  - Bowel regimen - rectal tube    ENDO:   A1c 6.4%  - FS goal 120-180  - HISS  - NPH  26q6     HEME/ONC:  LED negative 11/8  - SCDs  - Chemoppx: SQL    ID:  cefepime for enterobacter PNA    CODE STATUS:  [x] Full Code [] DNR [] DNI [] Palliative/Comfort Care    DISPOSITION:  [x] ICU [] Stroke Unit [] Floor [] EMU [] RCU [] PCU

## 2021-11-15 NOTE — PROGRESS NOTE ADULT - SUBJECTIVE AND OBJECTIVE BOX
NSCU ATTENDING -- ADDITIONAL PROGRESS NOTE    Nighttime rounds were performed -- please refer to earlier Progress Note for HPI details.    T(C): 37.9 (11-15-21 @ 19:00), Max: 38.4 (11-14-21 @ 23:00)  HR: 84 (11-15-21 @ 19:00) (70 - 100)  BP: 123/65 (11-15-21 @ 07:00) (100/49 - 142/76)  RR: 15 (11-15-21 @ 19:00) (11 - 22)  SpO2: 98% (11-15-21 @ 19:00) (97% - 100%)  Wt(kg): --    Relevant labwork and imaging reviewed.    Patient remains critically ill.    EVD pulled today.  Plan for post-pull CT brain in AM.  cooling blanket on for fevers.  goals of care discussions ongoing.    Additional 30 minutes of critical care time.

## 2021-11-15 NOTE — PROGRESS NOTE ADULT - ASSESSMENT
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 EVD clamped 11/13  Palliative called for advance care planning and goals of care

## 2021-11-15 NOTE — CHART NOTE - NSCHARTNOTEFT_GEN_A_CORE
EVD was cleared for removal per neurosurgery. EVD was clamped and patient was laid flat. Aseptic technique was performed and chlorhexidine was used at exit site. Staples were removed and 1 suture. EVD was removed without complication.  Paul applied for hemostasis. No csf or blood leaks from exit wound. Patient kept flat for 10 minutes.  Patient tolerated procedure well.

## 2021-11-15 NOTE — PROGRESS NOTE ADULT - PROBLEM SELECTOR PLAN 1
Posterior fossa hemorrhage  Severe IVH   S/P EVD  S/P SOC  No likelihood for meaningful recovery  Poor prognosis

## 2021-11-16 LAB
ANION GAP SERPL CALC-SCNC: 10 MMOL/L — SIGNIFICANT CHANGE UP (ref 5–17)
ANION GAP SERPL CALC-SCNC: 8 MMOL/L — SIGNIFICANT CHANGE UP (ref 5–17)
ANION GAP SERPL CALC-SCNC: 9 MMOL/L — SIGNIFICANT CHANGE UP (ref 5–17)
BLD GP AB SCN SERPL QL: NEGATIVE — SIGNIFICANT CHANGE UP
BUN SERPL-MCNC: 33 MG/DL — HIGH (ref 7–23)
BUN SERPL-MCNC: 40 MG/DL — HIGH (ref 7–23)
BUN SERPL-MCNC: 43 MG/DL — HIGH (ref 7–23)
CALCIUM SERPL-MCNC: 7.8 MG/DL — LOW (ref 8.4–10.5)
CALCIUM SERPL-MCNC: 7.9 MG/DL — LOW (ref 8.4–10.5)
CALCIUM SERPL-MCNC: 8 MG/DL — LOW (ref 8.4–10.5)
CHLORIDE SERPL-SCNC: 105 MMOL/L — SIGNIFICANT CHANGE UP (ref 96–108)
CHLORIDE SERPL-SCNC: 105 MMOL/L — SIGNIFICANT CHANGE UP (ref 96–108)
CHLORIDE SERPL-SCNC: 106 MMOL/L — SIGNIFICANT CHANGE UP (ref 96–108)
CO2 SERPL-SCNC: 25 MMOL/L — SIGNIFICANT CHANGE UP (ref 22–31)
CO2 SERPL-SCNC: 26 MMOL/L — SIGNIFICANT CHANGE UP (ref 22–31)
CO2 SERPL-SCNC: 26 MMOL/L — SIGNIFICANT CHANGE UP (ref 22–31)
CREAT SERPL-MCNC: 1.17 MG/DL — SIGNIFICANT CHANGE UP (ref 0.5–1.3)
CREAT SERPL-MCNC: 1.44 MG/DL — HIGH (ref 0.5–1.3)
CREAT SERPL-MCNC: 1.48 MG/DL — HIGH (ref 0.5–1.3)
GLUCOSE SERPL-MCNC: 121 MG/DL — HIGH (ref 70–99)
GLUCOSE SERPL-MCNC: 141 MG/DL — HIGH (ref 70–99)
GLUCOSE SERPL-MCNC: 86 MG/DL — SIGNIFICANT CHANGE UP (ref 70–99)
HCT VFR BLD CALC: 20.1 % — CRITICAL LOW (ref 39–50)
HCT VFR BLD CALC: 22.7 % — LOW (ref 39–50)
HCT VFR BLD CALC: 22.9 % — LOW (ref 39–50)
HGB BLD-MCNC: 6.6 G/DL — CRITICAL LOW (ref 13–17)
HGB BLD-MCNC: 7.2 G/DL — LOW (ref 13–17)
HGB BLD-MCNC: 7.5 G/DL — LOW (ref 13–17)
MAGNESIUM SERPL-MCNC: 2 MG/DL — SIGNIFICANT CHANGE UP (ref 1.6–2.6)
MCHC RBC-ENTMCNC: 29.8 PG — SIGNIFICANT CHANGE UP (ref 27–34)
MCHC RBC-ENTMCNC: 30 PG — SIGNIFICANT CHANGE UP (ref 27–34)
MCHC RBC-ENTMCNC: 30.3 PG — SIGNIFICANT CHANGE UP (ref 27–34)
MCHC RBC-ENTMCNC: 31.7 GM/DL — LOW (ref 32–36)
MCHC RBC-ENTMCNC: 32.8 GM/DL — SIGNIFICANT CHANGE UP (ref 32–36)
MCHC RBC-ENTMCNC: 32.8 GM/DL — SIGNIFICANT CHANGE UP (ref 32–36)
MCV RBC AUTO: 91.6 FL — SIGNIFICANT CHANGE UP (ref 80–100)
MCV RBC AUTO: 92.2 FL — SIGNIFICANT CHANGE UP (ref 80–100)
MCV RBC AUTO: 93.8 FL — SIGNIFICANT CHANGE UP (ref 80–100)
NRBC # BLD: 0 /100 WBCS — SIGNIFICANT CHANGE UP (ref 0–0)
PHOSPHATE SERPL-MCNC: 3.1 MG/DL — SIGNIFICANT CHANGE UP (ref 2.5–4.5)
PLATELET # BLD AUTO: 193 K/UL — SIGNIFICANT CHANGE UP (ref 150–400)
PLATELET # BLD AUTO: 206 K/UL — SIGNIFICANT CHANGE UP (ref 150–400)
PLATELET # BLD AUTO: 210 K/UL — SIGNIFICANT CHANGE UP (ref 150–400)
POTASSIUM SERPL-MCNC: 4 MMOL/L — SIGNIFICANT CHANGE UP (ref 3.5–5.3)
POTASSIUM SERPL-MCNC: 4.5 MMOL/L — SIGNIFICANT CHANGE UP (ref 3.5–5.3)
POTASSIUM SERPL-MCNC: 4.6 MMOL/L — SIGNIFICANT CHANGE UP (ref 3.5–5.3)
POTASSIUM SERPL-SCNC: 4 MMOL/L — SIGNIFICANT CHANGE UP (ref 3.5–5.3)
POTASSIUM SERPL-SCNC: 4.5 MMOL/L — SIGNIFICANT CHANGE UP (ref 3.5–5.3)
POTASSIUM SERPL-SCNC: 4.6 MMOL/L — SIGNIFICANT CHANGE UP (ref 3.5–5.3)
RBC # BLD: 2.18 M/UL — LOW (ref 4.2–5.8)
RBC # BLD: 2.42 M/UL — LOW (ref 4.2–5.8)
RBC # BLD: 2.5 M/UL — LOW (ref 4.2–5.8)
RBC # FLD: 13.8 % — SIGNIFICANT CHANGE UP (ref 10.3–14.5)
RBC # FLD: 14.1 % — SIGNIFICANT CHANGE UP (ref 10.3–14.5)
RBC # FLD: 14.3 % — SIGNIFICANT CHANGE UP (ref 10.3–14.5)
RH IG SCN BLD-IMP: POSITIVE — SIGNIFICANT CHANGE UP
SODIUM SERPL-SCNC: 139 MMOL/L — SIGNIFICANT CHANGE UP (ref 135–145)
SODIUM SERPL-SCNC: 140 MMOL/L — SIGNIFICANT CHANGE UP (ref 135–145)
SODIUM SERPL-SCNC: 141 MMOL/L — SIGNIFICANT CHANGE UP (ref 135–145)
WBC # BLD: 11.54 K/UL — HIGH (ref 3.8–10.5)
WBC # BLD: 13.55 K/UL — HIGH (ref 3.8–10.5)
WBC # BLD: 14.67 K/UL — HIGH (ref 3.8–10.5)
WBC # FLD AUTO: 11.54 K/UL — HIGH (ref 3.8–10.5)
WBC # FLD AUTO: 13.55 K/UL — HIGH (ref 3.8–10.5)
WBC # FLD AUTO: 14.67 K/UL — HIGH (ref 3.8–10.5)

## 2021-11-16 PROCEDURE — 71045 X-RAY EXAM CHEST 1 VIEW: CPT | Mod: 26

## 2021-11-16 PROCEDURE — 70450 CT HEAD/BRAIN W/O DYE: CPT | Mod: 26

## 2021-11-16 PROCEDURE — 99291 CRITICAL CARE FIRST HOUR: CPT

## 2021-11-16 PROCEDURE — 99233 SBSQ HOSP IP/OBS HIGH 50: CPT

## 2021-11-16 RX ORDER — LABETALOL HCL 100 MG
200 TABLET ORAL EVERY 8 HOURS
Refills: 0 | Status: DISCONTINUED | OUTPATIENT
Start: 2021-11-16 | End: 2021-11-17

## 2021-11-16 RX ORDER — FENTANYL CITRATE 50 UG/ML
25 INJECTION INTRAVENOUS EVERY 4 HOURS
Refills: 0 | Status: DISCONTINUED | OUTPATIENT
Start: 2021-11-16 | End: 2021-11-20

## 2021-11-16 RX ORDER — HEPARIN SODIUM 5000 [USP'U]/ML
5000 INJECTION INTRAVENOUS; SUBCUTANEOUS EVERY 8 HOURS
Refills: 0 | Status: DISCONTINUED | OUTPATIENT
Start: 2021-11-16 | End: 2021-11-18

## 2021-11-16 RX ORDER — DOXAZOSIN MESYLATE 4 MG
4 TABLET ORAL AT BEDTIME
Refills: 0 | Status: DISCONTINUED | OUTPATIENT
Start: 2021-11-16 | End: 2021-11-17

## 2021-11-16 RX ORDER — DEXAMETHASONE 0.5 MG/5ML
2 ELIXIR ORAL EVERY 12 HOURS
Refills: 0 | Status: DISCONTINUED | OUTPATIENT
Start: 2021-11-16 | End: 2021-11-18

## 2021-11-16 RX ORDER — LABETALOL HCL 100 MG
200 TABLET ORAL ONCE
Refills: 0 | Status: COMPLETED | OUTPATIENT
Start: 2021-11-16 | End: 2021-11-16

## 2021-11-16 RX ORDER — LABETALOL HCL 100 MG
10 TABLET ORAL ONCE
Refills: 0 | Status: COMPLETED | OUTPATIENT
Start: 2021-11-16 | End: 2021-11-16

## 2021-11-16 RX ORDER — DEXMEDETOMIDINE HYDROCHLORIDE IN 0.9% SODIUM CHLORIDE 4 UG/ML
0.2 INJECTION INTRAVENOUS
Qty: 200 | Refills: 0 | Status: DISCONTINUED | OUTPATIENT
Start: 2021-11-16 | End: 2021-11-18

## 2021-11-16 RX ORDER — HUMAN INSULIN 100 [IU]/ML
22 INJECTION, SUSPENSION SUBCUTANEOUS EVERY 6 HOURS
Refills: 0 | Status: DISCONTINUED | OUTPATIENT
Start: 2021-11-16 | End: 2021-11-17

## 2021-11-16 RX ADMIN — HEPARIN SODIUM 5000 UNIT(S): 5000 INJECTION INTRAVENOUS; SUBCUTANEOUS at 13:01

## 2021-11-16 RX ADMIN — Medication 650 MILLIGRAM(S): at 06:13

## 2021-11-16 RX ADMIN — HUMAN INSULIN 22 UNIT(S): 100 INJECTION, SUSPENSION SUBCUTANEOUS at 17:16

## 2021-11-16 RX ADMIN — Medication 650 MILLIGRAM(S): at 22:00

## 2021-11-16 RX ADMIN — PANTOPRAZOLE SODIUM 40 MILLIGRAM(S): 20 TABLET, DELAYED RELEASE ORAL at 11:16

## 2021-11-16 RX ADMIN — SODIUM CHLORIDE 50 MILLILITER(S): 9 INJECTION, SOLUTION INTRAVENOUS at 10:06

## 2021-11-16 RX ADMIN — HUMAN INSULIN 22 UNIT(S): 100 INJECTION, SUSPENSION SUBCUTANEOUS at 11:42

## 2021-11-16 RX ADMIN — HUMAN INSULIN 26 UNIT(S): 100 INJECTION, SUSPENSION SUBCUTANEOUS at 05:59

## 2021-11-16 RX ADMIN — CHLORHEXIDINE GLUCONATE 15 MILLILITER(S): 213 SOLUTION TOPICAL at 06:00

## 2021-11-16 RX ADMIN — Medication 2 MILLIGRAM(S): at 17:16

## 2021-11-16 RX ADMIN — SODIUM CHLORIDE 50 MILLILITER(S): 9 INJECTION, SOLUTION INTRAVENOUS at 12:26

## 2021-11-16 RX ADMIN — Medication 200 MILLIGRAM(S): at 10:28

## 2021-11-16 RX ADMIN — CEFEPIME 100 MILLIGRAM(S): 1 INJECTION, POWDER, FOR SOLUTION INTRAMUSCULAR; INTRAVENOUS at 11:17

## 2021-11-16 RX ADMIN — Medication 650 MILLIGRAM(S): at 05:08

## 2021-11-16 RX ADMIN — FENTANYL CITRATE 25 MICROGRAM(S): 50 INJECTION INTRAVENOUS at 10:47

## 2021-11-16 RX ADMIN — Medication 200 MILLIGRAM(S): at 13:01

## 2021-11-16 RX ADMIN — CEFEPIME 100 MILLIGRAM(S): 1 INJECTION, POWDER, FOR SOLUTION INTRAMUSCULAR; INTRAVENOUS at 23:54

## 2021-11-16 RX ADMIN — CHLORHEXIDINE GLUCONATE 15 MILLILITER(S): 213 SOLUTION TOPICAL at 17:16

## 2021-11-16 RX ADMIN — Medication 10 MILLIGRAM(S): at 10:00

## 2021-11-16 RX ADMIN — Medication 200 MILLIGRAM(S): at 21:25

## 2021-11-16 RX ADMIN — DEXMEDETOMIDINE HYDROCHLORIDE IN 0.9% SODIUM CHLORIDE 4.34 MICROGRAM(S)/KG/HR: 4 INJECTION INTRAVENOUS at 12:26

## 2021-11-16 RX ADMIN — ATORVASTATIN CALCIUM 40 MILLIGRAM(S): 80 TABLET, FILM COATED ORAL at 21:23

## 2021-11-16 RX ADMIN — Medication 3 MILLIGRAM(S): at 06:12

## 2021-11-16 RX ADMIN — CHLORHEXIDINE GLUCONATE 1 APPLICATION(S): 213 SOLUTION TOPICAL at 21:24

## 2021-11-16 RX ADMIN — HEPARIN SODIUM 5000 UNIT(S): 5000 INJECTION INTRAVENOUS; SUBCUTANEOUS at 21:25

## 2021-11-16 RX ADMIN — Medication 4 MILLIGRAM(S): at 21:24

## 2021-11-16 RX ADMIN — FENTANYL CITRATE 25 MICROGRAM(S): 50 INJECTION INTRAVENOUS at 10:17

## 2021-11-16 RX ADMIN — Medication 650 MILLIGRAM(S): at 21:30

## 2021-11-16 NOTE — PROGRESS NOTE ADULT - ASSESSMENT
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 EVD clamped 11/13      NEURO:   neuro checks q1  - EVD Clamped 11/13 @10am, CTH monday  - [id number] for cerebral edema  TCDs  - pain control  - ongoing GOC with family  - monitor for vasospasm, TCDs  - d/c Nimodipine, d/c TCDs   - palliative care consult, family meeting on Monday    PULM:  Intubated for airway protection  - CPAP trials   - vent bundle  - chest PT    CV:  Afib RVR on admission  - SBP goal 100-200  - cardene PRN to goal  - labetalol 200mg q8h will dc    RENAL:  Baseline Cr 1.24, currently 1.4  Na goal >145  Goal euvolemia to net +  - trend lytes  - daily IOs  - FWB 300q6h cont    GI:   Diet: TF  - GI prophylaxis: PPI while intubated  - Bowel regimen - rectal tube    ENDO:   A1c 6.4%  - FS goal 120-180  - HISS  - NPH  26q6     HEME/ONC:  LED negative 11/8  - SCDs  - Chemoppx: SQL    ID:  cefepime for enterobacter PNA    CODE STATUS:  [x] Full Code [] DNR [] DNI [] Palliative/Comfort Care    DISPOSITION:  [x] ICU [] Stroke Unit [] Floor [] EMU [] RCU [] PCU   ASSESSMENT: 62 yo man hx of HTN, DM, admitted 11/4 with HA/N/V followed by unresponsiveness, CT head with posterior fossa hemorrhage c/b severe IVH with casting of the 4th and 3rd ventricles with hydrocephalus, s/p EVD and SOC, 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 EVD clamped 11/13      NEURO:   neuro checks q1  - [id number] for cerebral edema  - pain control  - ongoing GOC with family  -CT head stable post p    PULM:  Intubated for airway protection  - CPAP trials   - vent bundle  - chest PT  -GOC for trach     CV:  Afib RVR on admission  - SBP goal 100-160  - cardene PRN to goal  - labetalol 200mg q8h will    RENAL:  Baseline Cr 1.24, currently 1.4  Na goal >145  Goal euvolemia to net +  - trend lytes  - daily IOs  LR decrease to 50cc/hr     GI:   Diet: TF  - GI prophylaxis: PPI while intubated  - Bowel regimen - rectal tube d/c'd   -Peg     ENDO:   A1c 6.4%  - FS goal 120-180  - HISS  - NPH  26q6     HEME/ONC:  LED negative 11/8  - SCDs  - Chemoppx: heparin SQ    ID:  cefepime end date 11/21 (7 days) for enterobacter PNA    CODE STATUS:  [x] Full Code [] DNR [] DNI [] Palliative/Comfort Care    DISPOSITION:  [x] ICU [] Stroke Unit [] Floor [] EMU [] RCU [] PCU

## 2021-11-16 NOTE — PROGRESS NOTE ADULT - PROBLEM SELECTOR PLAN 1
CTH significant for hemorrhage in the fourth ventricle and cerebellar vermis with intraventricular hemorrhage extending into the third and lateral ventricles. Moderate hydrocephalus.  s/p EVD placement and suboccipital craniectomy  guarded prognosis.

## 2021-11-16 NOTE — PROGRESS NOTE ADULT - SUBJECTIVE AND OBJECTIVE BOX
24 hr EVENTS:       EXAMINATION:  General:  calm  HEENT:  MMM  Neuro:  MELVI to nox, non reactive, +cough/gag ,+overbreathes, no corneals, uppers 0/5, spont LLE, nothing RLE  Cards:  RRR  Respiratory:  no respiratory distress  Abdomen:  soft  Extremities:  no edema      ICU Vital Signs Last 24 Hrs  T(C): 37.7 (16 Nov 2021 06:00), Max: 38.3 (15 Nov 2021 17:00)  T(F): 99.9 (16 Nov 2021 06:00), Max: 100.9 (15 Nov 2021 17:00)  HR: 84 (16 Nov 2021 06:00) (74 - 106)  ABP: 171/71 (16 Nov 2021 06:00) (103/48 - 171/71)  ABP(mean): 98 (16 Nov 2021 06:00) (63 - 98)  RR: 34 (16 Nov 2021 06:00) (11 - 34)  SpO2: 100% (16 Nov 2021 06:00) (98% - 100%)      11-15-21 @ 07:01  -  11-16-21 @ 07:00  --------------------------------------------------------  IN: 5170 mL / OUT: 3550 mL / NET: 1620 mL        Mode: AC/ CMV (Assist Control/ Continuous Mandatory Ventilation), RR (machine): 16, TV (machine): 450, FiO2: 30, PEEP: 5, ITime: 1, MAP: 13, PIP: 25    acetaminophen    Suspension .. 650 milliGRAM(s) Oral every 6 hours PRN  atorvastatin 40 milliGRAM(s) Oral at bedtime  cefepime   IVPB      cefepime   IVPB 2000 milliGRAM(s) IV Intermittent every 12 hours  chlorhexidine 0.12% Liquid 15 milliLiter(s) Oral Mucosa two times a day  chlorhexidine 4% Liquid 1 Application(s) Topical <User Schedule>  dexAMETHasone  Injectable 3 milliGRAM(s) IV Push every 12 hours  dextrose 50% Injectable 25 Gram(s) IV Push once  dextrose 50% Injectable 12.5 Gram(s) IV Push once  dextrose 50% Injectable 25 Gram(s) IV Push once  doxazosin 4 milliGRAM(s) Oral at bedtime  influenza   Vaccine 0.5 milliLiter(s) IntraMuscular once  insulin lispro (ADMELOG) corrective regimen sliding scale   SubCutaneous every 6 hours  insulin NPH human recombinant 26 Unit(s) SubCutaneous every 6 hours  lactated ringers. 1000 milliLiter(s) (100 mL/Hr) IV Continuous <Continuous>  pantoprazole  Injectable 40 milliGRAM(s) IV Push daily  psyllium Powder 1 Packet(s) Oral two times a day      LABS:  Na: 141 (11-16 @ 00:19), 142 (11-15 @ 17:47), 142 (11-15 @ 11:16), 142 (11-15 @ 03:25), 143 (11-14 @ 21:19), 143 (11-14 @ 15:16), 146 (11-14 @ 07:03), 145 (11-13 @ 20:54)  K: 4.6 (11-16 @ 00:19), 4.3 (11-15 @ 17:47), 4.5 (11-15 @ 11:16), 4.3 (11-15 @ 03:25), 4.6 (11-14 @ 21:19), 4.2 (11-14 @ 15:16), 4.1 (11-14 @ 07:03), 4.5 (11-13 @ 20:54)  Cl: 106 (11-16 @ 00:19), 108 (11-15 @ 17:47), 107 (11-15 @ 11:16), 106 (11-15 @ 03:25), 107 (11-14 @ 21:19), 109 (11-14 @ 15:16), 110 (11-14 @ 07:03), 110 (11-13 @ 20:54)  CO2: 25 (11-16 @ 00:19), 24 (11-15 @ 17:47), 24 (11-15 @ 11:16), 24 (11-15 @ 03:25), 25 (11-14 @ 21:19), 25 (11-14 @ 15:16), 26 (11-14 @ 07:03), 25 (11-13 @ 20:54)  BUN: 43 (11-16 @ 00:19), 42 (11-15 @ 17:47), 44 (11-15 @ 11:16), 49 (11-15 @ 03:25), 46 (11-14 @ 21:19), 40 (11-14 @ 15:16), 41 (11-14 @ 07:03), 44 (11-13 @ 20:54)  Cr: 1.48 (11-16 @ 00:19), 1.48 (11-15 @ 17:47), 1.51 (11-15 @ 11:16), 1.94 (11-15 @ 03:25), 1.89 (11-14 @ 21:19), 1.45 (11-14 @ 15:16), 1.49 (11-14 @ 07:03), 1.41 (11-13 @ 20:54)  Glu: 141(11-16 @ 00:19), 128(11-15 @ 17:47), 145(11-15 @ 11:16), 155(11-15 @ 03:25), 128(11-14 @ 21:19), 150(11-14 @ 15:16), 150(11-14 @ 07:03), 230(11-13 @ 20:54)    Hgb: 7.2 (11-16 @ 06:36), 6.6 (11-16 @ 00:19), 7.6 (11-14 @ 21:19), 7.7 (11-13 @ 20:55)  Hct: 22.7 (11-16 @ 06:36), 20.1 (11-16 @ 00:19), 23.4 (11-14 @ 21:19), 24.3 (11-13 @ 20:55)  WBC: 13.55 (11-16 @ 06:36), 14.67 (11-16 @ 00:19), 20.94 (11-14 @ 21:19), 15.57 (11-13 @ 20:55)  Plt: 193 (11-16 @ 06:36), 210 (11-16 @ 00:19), 247 (11-14 @ 21:19), 252 (11-13 @ 20:55)    INR: 1.01 11-15-21 @ 11:16  PTT: 30.1 11-15-21 @ 11:16                       24 hr EVENTS:   1U prbc overnight for hgb <6    EXAMINATION:  General:  calm  HEENT:  MMM  Neuro:  MELVI to nox, non reactive, +cough/gag ,+overbreathes, no corneals, wiggles toes LE   Cards:  RRR  Respiratory:  no respiratory distress  Abdomen:  soft  Extremities:  no edema      ICU Vital Signs Last 24 Hrs  T(C): 37.7 (16 Nov 2021 06:00), Max: 38.3 (15 Nov 2021 17:00)  T(F): 99.9 (16 Nov 2021 06:00), Max: 100.9 (15 Nov 2021 17:00)  HR: 84 (16 Nov 2021 06:00) (74 - 106)  ABP: 171/71 (16 Nov 2021 06:00) (103/48 - 171/71)  ABP(mean): 98 (16 Nov 2021 06:00) (63 - 98)  RR: 34 (16 Nov 2021 06:00) (11 - 34)  SpO2: 100% (16 Nov 2021 06:00) (98% - 100%)      11-15-21 @ 07:01  -  11-16-21 @ 07:00  --------------------------------------------------------  IN: 5170 mL / OUT: 3550 mL / NET: 1620 mL        Mode: AC/ CMV (Assist Control/ Continuous Mandatory Ventilation), RR (machine): 16, TV (machine): 450, FiO2: 30, PEEP: 5, ITime: 1, MAP: 13, PIP: 25    acetaminophen    Suspension .. 650 milliGRAM(s) Oral every 6 hours PRN  atorvastatin 40 milliGRAM(s) Oral at bedtime  cefepime   IVPB      cefepime   IVPB 2000 milliGRAM(s) IV Intermittent every 12 hours  chlorhexidine 0.12% Liquid 15 milliLiter(s) Oral Mucosa two times a day  chlorhexidine 4% Liquid 1 Application(s) Topical <User Schedule>  dexAMETHasone  Injectable 3 milliGRAM(s) IV Push every 12 hours  dextrose 50% Injectable 25 Gram(s) IV Push once  dextrose 50% Injectable 12.5 Gram(s) IV Push once  dextrose 50% Injectable 25 Gram(s) IV Push once  doxazosin 4 milliGRAM(s) Oral at bedtime  influenza   Vaccine 0.5 milliLiter(s) IntraMuscular once  insulin lispro (ADMELOG) corrective regimen sliding scale   SubCutaneous every 6 hours  insulin NPH human recombinant 26 Unit(s) SubCutaneous every 6 hours  lactated ringers. 1000 milliLiter(s) (100 mL/Hr) IV Continuous <Continuous>  pantoprazole  Injectable 40 milliGRAM(s) IV Push daily  psyllium Powder 1 Packet(s) Oral two times a day      LABS:  Na: 141 (11-16 @ 00:19), 142 (11-15 @ 17:47), 142 (11-15 @ 11:16), 142 (11-15 @ 03:25), 143 (11-14 @ 21:19), 143 (11-14 @ 15:16), 146 (11-14 @ 07:03), 145 (11-13 @ 20:54)  K: 4.6 (11-16 @ 00:19), 4.3 (11-15 @ 17:47), 4.5 (11-15 @ 11:16), 4.3 (11-15 @ 03:25), 4.6 (11-14 @ 21:19), 4.2 (11-14 @ 15:16), 4.1 (11-14 @ 07:03), 4.5 (11-13 @ 20:54)  Cl: 106 (11-16 @ 00:19), 108 (11-15 @ 17:47), 107 (11-15 @ 11:16), 106 (11-15 @ 03:25), 107 (11-14 @ 21:19), 109 (11-14 @ 15:16), 110 (11-14 @ 07:03), 110 (11-13 @ 20:54)  CO2: 25 (11-16 @ 00:19), 24 (11-15 @ 17:47), 24 (11-15 @ 11:16), 24 (11-15 @ 03:25), 25 (11-14 @ 21:19), 25 (11-14 @ 15:16), 26 (11-14 @ 07:03), 25 (11-13 @ 20:54)  BUN: 43 (11-16 @ 00:19), 42 (11-15 @ 17:47), 44 (11-15 @ 11:16), 49 (11-15 @ 03:25), 46 (11-14 @ 21:19), 40 (11-14 @ 15:16), 41 (11-14 @ 07:03), 44 (11-13 @ 20:54)  Cr: 1.48 (11-16 @ 00:19), 1.48 (11-15 @ 17:47), 1.51 (11-15 @ 11:16), 1.94 (11-15 @ 03:25), 1.89 (11-14 @ 21:19), 1.45 (11-14 @ 15:16), 1.49 (11-14 @ 07:03), 1.41 (11-13 @ 20:54)  Glu: 141(11-16 @ 00:19), 128(11-15 @ 17:47), 145(11-15 @ 11:16), 155(11-15 @ 03:25), 128(11-14 @ 21:19), 150(11-14 @ 15:16), 150(11-14 @ 07:03), 230(11-13 @ 20:54)    Hgb: 7.2 (11-16 @ 06:36), 6.6 (11-16 @ 00:19), 7.6 (11-14 @ 21:19), 7.7 (11-13 @ 20:55)  Hct: 22.7 (11-16 @ 06:36), 20.1 (11-16 @ 00:19), 23.4 (11-14 @ 21:19), 24.3 (11-13 @ 20:55)  WBC: 13.55 (11-16 @ 06:36), 14.67 (11-16 @ 00:19), 20.94 (11-14 @ 21:19), 15.57 (11-13 @ 20:55)  Plt: 193 (11-16 @ 06:36), 210 (11-16 @ 00:19), 247 (11-14 @ 21:19), 252 (11-13 @ 20:55)    INR: 1.01 11-15-21 @ 11:16  PTT: 30.1 11-15-21 @ 11:16

## 2021-11-16 NOTE — PROGRESS NOTE ADULT - ASSESSMENT
ANGLE ALICIA  61M pmhx HTN, DM at work complaining of HA ~8:30, starting feeling dizzy and vomiting, HTN/keke when EMS got to him. SBP 220s, HR 50s. CTH shows large posterior fossa bleed w/ IVH/SAH/hydro.   -Adm NSCU, q1h neuro check  -EVD discontinued  -postpull CTH in AM

## 2021-11-16 NOTE — PROGRESS NOTE ADULT - SUBJECTIVE AND OBJECTIVE BOX
NSCU ATTENDING -- ADDITIONAL PROGRESS NOTE    Nighttime rounds were performed -- please refer to earlier Progress Note for HPI details.    T(C): 37.3 (11-16-21 @ 19:00), Max: 38.2 (11-16-21 @ 05:00)  HR: 75 (11-16-21 @ 21:13) (64 - 106)  BP: --  RR: 11 (11-16-21 @ 20:00) (11 - 34)  SpO2: 100% (11-16-21 @ 21:13) (99% - 100%)  Wt(kg): --    Relevant labwork and imaging reviewed.    EVD out, still intubated, DNR today, pending further GOC.

## 2021-11-16 NOTE — PROGRESS NOTE ADULT - SUBJECTIVE AND OBJECTIVE BOX
SUBJECTIVE AND OBJECTIVE:  INTERVAL HPI/OVERNIGHT EVENTS:  Remains intubated, cpap trials,  following some commands today    DNR on chart:   Allergies    penicillin (Other)    Intolerances    MEDICATIONS  (STANDING):  atorvastatin 40 milliGRAM(s) Oral at bedtime  cefepime   IVPB      cefepime   IVPB 2000 milliGRAM(s) IV Intermittent every 12 hours  chlorhexidine 0.12% Liquid 15 milliLiter(s) Oral Mucosa two times a day  chlorhexidine 4% Liquid 1 Application(s) Topical <User Schedule>  dexAMETHasone  Injectable 2 milliGRAM(s) IV Push every 12 hours  dexMEDEtomidine Infusion 0.2 MICROgram(s)/kG/Hr (4.34 mL/Hr) IV Continuous <Continuous>  dextrose 50% Injectable 25 Gram(s) IV Push once  dextrose 50% Injectable 12.5 Gram(s) IV Push once  dextrose 50% Injectable 25 Gram(s) IV Push once  doxazosin 4 milliGRAM(s) Oral at bedtime  heparin   Injectable 5000 Unit(s) SubCutaneous every 8 hours  influenza   Vaccine 0.5 milliLiter(s) IntraMuscular once  insulin lispro (ADMELOG) corrective regimen sliding scale   SubCutaneous every 6 hours  insulin NPH human recombinant 22 Unit(s) SubCutaneous every 6 hours  labetalol 200 milliGRAM(s) Oral every 8 hours  lactated ringers. 1000 milliLiter(s) (50 mL/Hr) IV Continuous <Continuous>  pantoprazole  Injectable 40 milliGRAM(s) IV Push daily    MEDICATIONS  (PRN):  acetaminophen    Suspension .. 650 milliGRAM(s) Oral every 6 hours PRN Temp greater or equal to 38C (100.4F), Mild Pain (1 - 3)  fentaNYL    Injectable 25 MICROGram(s) IV Push every 4 hours PRN Moderate Pain (4 - 6)      ITEMS UNCHECKED ARE NOT PRESENT    PRESENT SYMPTOMS: xx[ x]Unable to obtain due to poor mentation   Source if other than patient:  [ ]Family   [ ]Team     Pain:  [ ]yes [ ]no  QOL impact -   Location -                    Aggravating factors -  Quality -  Radiation -  Timing-  Severity (0-10 scale):  Minimal acceptable level (0-10 scale):     Dyspnea:                           [ ]Mild [ ]Moderate [ ]Severe  Anxiety:                             [ ]Mild [ ]Moderate [ ]Severe  Fatigue:                             [ ]Mild [ ]Moderate [ ]Severe  Nausea:                             [ ]Mild [ ]Moderate [ ]Severe  Loss of appetite:              [ ]Mild [ ]Moderate [ ]Severe  Constipation:                    [ ]Mild [ ]Moderate [ ]Severe    PAIN AD Score:	0  [email or web address] (Ctrl + left click to view)    Other Symptoms:  [ ]All other review of systems negative     Palliative Performance Status Version 2:    20     %      [email or web address]_[email or web address]  PHYSICAL EXAM:  Vital Signs Last 24 Hrs  T(C): 36 (2021 13:00), Max: 38.3 (15 Nov 2021 17:00)  T(F): 96.8 (2021 13:00), Max: 100.9 (15 Nov 2021 17:00)  HR: 69 (:00) (64 - 106)  BP: --  BP(mean): --  RR: 14 (2021 13:00) (13 - 34)  SpO2: 100% (2021 13:00) (98% - 100%) I&O's Summary    15 Nov 2021 07:01  -  2021 07:00  --------------------------------------------------------  IN: 5270 mL / OUT: 3675 mL / NET: 1595 mL    2021 07:01  -  2021 13:28  --------------------------------------------------------  IN: 907.2 mL / OUT: 990 mL / NET: -82.8 mL       GENERAL:  [ ]Alert  [ ]Oriented x   [ ]Lethargic  [ ]Cachexia  [ x]Unarousable  [ ]Verbal  [ x]Non-Verbal  Behavioral:   [ ]Anxiety  [ ]Delirium [ ]Agitation [ ]Other  HEENT:  [ ]Normal   [ ]Dry mouth   [ x]ET Tube/Trach  [ ]Oral lesions  PULMONARY:   [ ]Clear [ ]Tachypnea  [ ]Audible excessive secretions   [ ]Rhonchi        [ ]Right [ ]Left [ ]Bilateral  [ ]Crackles        [ ]Right [ ]Left [ ]Bilateral  [ ]Wheezing     [ ]Right [ ]Left [ ]Bilateral  [ ]Diminished BS [ ] Right [ ]Left [ ]Bilateral  CARDIOVASCULAR:    [x ]Regular [ ]Irregular [ ]Tachy  [ ]Bridger [ ]Murmur [ ]Other  GASTROINTESTINAL:  [ x]Soft  [ ]Distended   [ x]+BS  [ ]Non tender [ ]Tender  [ ]PEG [x ]OGT/ NGT   Last BM:    GENITOURINARY:  [ ]Normal [ ]Incontinent   [ ]Oliguria/Anuria   [x ]Barney  MUSCULOSKELETAL:   [ ]Normal   [ ]Weakness  [x ]Bed/Wheelchair bound [ ]Edema  NEUROLOGIC:   [ ]No focal deficits  [ ] Cognitive impairment  [ ] Dysphagia [ ]Dysarthria [ ] Paresis [ ]Other   SKIN:   [ ]Normal  [ ]Rash   [ ]Pressure ulcer(s) [ ]y [ ]n present on admission    CRITICAL CARE:  [ ]Shock Present  [ ]Septic [ ]Cardiogenic x]Neurologic [ ]Hypovolemic  [ ]Vasopressors [ ]Inotropes  [ x]Respiratory failure present [x ]Mechanical Ventilation [ ]Non-invasive ventilatory support [ ]High-Flow  [x ]Acute  [ ]Chronic [ ]Hypoxic  [ ]Hypercarbic [ ]Other  [ ]Other organ failure     LABS:                        7.2    13.55 )-----------( 193      ( 2021 06:36 )             22.7   11-16    140  |  105  |  40<H>  ----------------------------<  121<H>  4.0   |  26  |  1.44<H>    Ca    7.9<L>      2021 06:36  Phos  3.8       Mg     2.0     -16    PT/INR - ( 15 Nov 2021 11:16 )   PT: 12.1 sec;   INR: 1.01 ratio         PTT - ( 15 Nov 2021 11:16 )  PTT:30.1 sec    Urinalysis Basic - ( 15 Nov 2021 08:44 )    Color: Colorless / Appearance: Clear / S.016 / pH: x  Gluc: x / Ketone: Negative  / Bili: Negative / Urobili: Negative   Blood: x / Protein: Negative / Nitrite: Negative   Leuk Esterase: Negative / RBC: x / WBC x   Sq Epi: x / Non Sq Epi: x / Bacteria: x      RADIOLOGY & ADDITIONAL STUDIES:    < from: CT Head No Cont (21 @ 08:28) >    EXAM:  CT BRAIN                            PROCEDURE DATE:  2021            INTERPRETATION:  Noncontrast CT of the brain.    CLINICAL INDICATION:  SOC avm resection    TECHNIQUE : Axial CT scanning of the brain was obtained from the skull base to the vertex without the administration of intravenous contrast. Sagittal and coronal reformats were provided.    COMPARISON: CT brain 11/15/2021    FINDINGS:    Interval removal of right frontal approach ventriculostomy catheter.    Slightly increased hemorrhage layering in the occipital horns.    Ventricles slightly decreased in size, with residual mild ventricular dilatation. No midline shift. Basal cisterns are visualized.    Similar low density right frontoparietal subdural collection measuring 7 mm in greatest depth.    Redemonstration of midline suboccipital craniectomy.    Similar appearing resolving vermian hemorrhage and edema in the cerebellar vermis and mesial cerebellar hemispheres.    IMPRESSION:    Interval removal of right frontal approach ventriculostomy catheter.    Slightly increased hemorrhage layering in the occipital horns.    Ventricles slightly decreased in size, with residual mild ventricular dilatation    Similar low density right frontoparietal subdural collection measuring 7 mm in greatest depth.  Similar appearing resolving vermian hemorrhage and edema in the cerebellar vermis and mesial cerebellar hemispheres.    --- End of Report ---            < end of copied text >  < from: CT Head No Cont (11.15.21 @ 09:20) >        INTERPRETATION:  Noncontrast CT of the brain.    CLINICAL INDICATION:  ICH    TECHNIQUE : Axial CT scanning of the brain was obtained from the skull base to the vertex without the administration of intravenous contrast. Sagittal and coronal reformats were provided.    COMPARISON: CT brain 2021    FINDINGS:    Redemonstration of right frontal approach ventriculostomy catheter in unchanged position.    Similarintraventricular hemorrhage, overall mild in degree. Similar mild ventricular dilatation.    Redemonstration of midline suboccipital craniectomy. Redemonstration of resolving vermian hemorrhage as well as vermian and cerebellar edema.    Similar right lateral convexity low-density subdural collection measuring 6 mm in greatest depth.    IMPRESSION:    No significant interval change from 2021.    --- End of Report ---              < end of copied text >      Protein Calorie Malnutrition Present: [ ]mild [ ]moderate [ ]severe [ ]underweight [ ]morbid obesity  https://www.andeal.org/vault/2440/web/files/ONC/Table_Clinical%20Characteristics%20to%20Document%20Malnutrition-White%20JV%20et%20al%2020.pdf    Height (cm): 177.8 (11-10-21 @ 14:00)  Weight (kg): 86.8 (11-10-21 @ 14:00)  BMI (kg/m2): 27.5 (11-10-21 @ 14:00)    [ ]PPSV2 < or = 30%  [ ]significant weight loss [ ]poor nutritional intake [ ]anasarca    [ ]Artificial Nutrition    REFERRALS:   [ ]Chaplaincy  [ ]Hospice  [ ]Child Life  [ ]Social Work  [ ]Case management [ ]Holistic Therapy     Goals of Care Document:

## 2021-11-16 NOTE — PROGRESS NOTE ADULT - SUBJECTIVE AND OBJECTIVE BOX
Patient seen and examined at bedside.    --Anticoagulation--    T(C): 37.9 (11-16-21 @ 00:00), Max: 38.3 (11-15-21 @ 17:00)  HR: 87 (11-16-21 @ 00:00) (70 - 100)  BP: 123/65 (11-15-21 @ 07:00) (115/62 - 130/66)  RR: 16 (11-16-21 @ 00:00) (11 - 22)  SpO2: 99% (11-16-21 @ 00:00) (98% - 100%)  Wt(kg): --    Exam:    intubated, weak corneals, pupils 3 NR, cough, no FC, flaccid

## 2021-11-17 DIAGNOSIS — J96.90 RESPIRATORY FAILURE, UNSPECIFIED, UNSPECIFIED WHETHER WITH HYPOXIA OR HYPERCAPNIA: ICD-10-CM

## 2021-11-17 LAB
ANION GAP SERPL CALC-SCNC: 13 MMOL/L — SIGNIFICANT CHANGE UP (ref 5–17)
BUN SERPL-MCNC: 38 MG/DL — HIGH (ref 7–23)
CALCIUM SERPL-MCNC: 7.7 MG/DL — LOW (ref 8.4–10.5)
CHLORIDE SERPL-SCNC: 101 MMOL/L — SIGNIFICANT CHANGE UP (ref 96–108)
CO2 SERPL-SCNC: 24 MMOL/L — SIGNIFICANT CHANGE UP (ref 22–31)
CREAT SERPL-MCNC: 1.37 MG/DL — HIGH (ref 0.5–1.3)
CULTURE RESULTS: SIGNIFICANT CHANGE UP
CULTURE RESULTS: SIGNIFICANT CHANGE UP
GLUCOSE SERPL-MCNC: 117 MG/DL — HIGH (ref 70–99)
GRAM STN FLD: SIGNIFICANT CHANGE UP
HCT VFR BLD CALC: 23.2 % — LOW (ref 39–50)
HGB BLD-MCNC: 7.6 G/DL — LOW (ref 13–17)
MAGNESIUM SERPL-MCNC: 2.1 MG/DL — SIGNIFICANT CHANGE UP (ref 1.6–2.6)
MCHC RBC-ENTMCNC: 30.2 PG — SIGNIFICANT CHANGE UP (ref 27–34)
MCHC RBC-ENTMCNC: 32.8 GM/DL — SIGNIFICANT CHANGE UP (ref 32–36)
MCV RBC AUTO: 92.1 FL — SIGNIFICANT CHANGE UP (ref 80–100)
NRBC # BLD: 0 /100 WBCS — SIGNIFICANT CHANGE UP (ref 0–0)
PHOSPHATE SERPL-MCNC: 4.5 MG/DL — SIGNIFICANT CHANGE UP (ref 2.5–4.5)
PLATELET # BLD AUTO: 252 K/UL — SIGNIFICANT CHANGE UP (ref 150–400)
POTASSIUM SERPL-MCNC: 4.3 MMOL/L — SIGNIFICANT CHANGE UP (ref 3.5–5.3)
POTASSIUM SERPL-SCNC: 4.3 MMOL/L — SIGNIFICANT CHANGE UP (ref 3.5–5.3)
RBC # BLD: 2.52 M/UL — LOW (ref 4.2–5.8)
RBC # FLD: 13.8 % — SIGNIFICANT CHANGE UP (ref 10.3–14.5)
SODIUM SERPL-SCNC: 138 MMOL/L — SIGNIFICANT CHANGE UP (ref 135–145)
SPECIMEN SOURCE: SIGNIFICANT CHANGE UP
WBC # BLD: 10.89 K/UL — HIGH (ref 3.8–10.5)
WBC # FLD AUTO: 10.89 K/UL — HIGH (ref 3.8–10.5)

## 2021-11-17 PROCEDURE — 99497 ADVNCD CARE PLAN 30 MIN: CPT

## 2021-11-17 PROCEDURE — 95720 EEG PHY/QHP EA INCR W/VEEG: CPT

## 2021-11-17 PROCEDURE — 71045 X-RAY EXAM CHEST 1 VIEW: CPT | Mod: 26

## 2021-11-17 PROCEDURE — 71045 X-RAY EXAM CHEST 1 VIEW: CPT | Mod: 26,77

## 2021-11-17 PROCEDURE — 99291 CRITICAL CARE FIRST HOUR: CPT

## 2021-11-17 PROCEDURE — 99222 1ST HOSP IP/OBS MODERATE 55: CPT

## 2021-11-17 PROCEDURE — 99233 SBSQ HOSP IP/OBS HIGH 50: CPT

## 2021-11-17 PROCEDURE — 99292 CRITICAL CARE ADDL 30 MIN: CPT

## 2021-11-17 RX ORDER — CHLORHEXIDINE GLUCONATE 213 G/1000ML
1 SOLUTION TOPICAL
Refills: 0 | Status: DISCONTINUED | OUTPATIENT
Start: 2021-11-17 | End: 2021-11-23

## 2021-11-17 RX ORDER — DEXTROSE 50 % IN WATER 50 %
25 SYRINGE (ML) INTRAVENOUS ONCE
Refills: 0 | Status: COMPLETED | OUTPATIENT
Start: 2021-11-17 | End: 2021-11-17

## 2021-11-17 RX ORDER — DOXAZOSIN MESYLATE 4 MG
8 TABLET ORAL AT BEDTIME
Refills: 0 | Status: DISCONTINUED | OUTPATIENT
Start: 2021-11-17 | End: 2021-11-22

## 2021-11-17 RX ORDER — HUMAN INSULIN 100 [IU]/ML
13 INJECTION, SUSPENSION SUBCUTANEOUS EVERY 6 HOURS
Refills: 0 | Status: DISCONTINUED | OUTPATIENT
Start: 2021-11-17 | End: 2021-11-21

## 2021-11-17 RX ORDER — CEFEPIME 1 G/1
2000 INJECTION, POWDER, FOR SOLUTION INTRAMUSCULAR; INTRAVENOUS EVERY 8 HOURS
Refills: 0 | Status: COMPLETED | OUTPATIENT
Start: 2021-11-17 | End: 2021-11-21

## 2021-11-17 RX ORDER — LABETALOL HCL 100 MG
100 TABLET ORAL EVERY 8 HOURS
Refills: 0 | Status: DISCONTINUED | OUTPATIENT
Start: 2021-11-17 | End: 2021-11-17

## 2021-11-17 RX ORDER — LABETALOL HCL 100 MG
100 TABLET ORAL EVERY 8 HOURS
Refills: 0 | Status: DISCONTINUED | OUTPATIENT
Start: 2021-11-17 | End: 2021-11-21

## 2021-11-17 RX ORDER — CALCIUM GLUCONATE 100 MG/ML
2 VIAL (ML) INTRAVENOUS ONCE
Refills: 0 | Status: COMPLETED | OUTPATIENT
Start: 2021-11-17 | End: 2021-11-17

## 2021-11-17 RX ADMIN — CHLORHEXIDINE GLUCONATE 15 MILLILITER(S): 213 SOLUTION TOPICAL at 17:39

## 2021-11-17 RX ADMIN — Medication 2 MILLIGRAM(S): at 17:39

## 2021-11-17 RX ADMIN — HUMAN INSULIN 13 UNIT(S): 100 INJECTION, SUSPENSION SUBCUTANEOUS at 11:49

## 2021-11-17 RX ADMIN — DEXMEDETOMIDINE HYDROCHLORIDE IN 0.9% SODIUM CHLORIDE 4.34 MICROGRAM(S)/KG/HR: 4 INJECTION INTRAVENOUS at 10:38

## 2021-11-17 RX ADMIN — CEFEPIME 100 MILLIGRAM(S): 1 INJECTION, POWDER, FOR SOLUTION INTRAMUSCULAR; INTRAVENOUS at 10:37

## 2021-11-17 RX ADMIN — Medication 2 MILLIGRAM(S): at 05:28

## 2021-11-17 RX ADMIN — HEPARIN SODIUM 5000 UNIT(S): 5000 INJECTION INTRAVENOUS; SUBCUTANEOUS at 05:28

## 2021-11-17 RX ADMIN — Medication 8: at 11:48

## 2021-11-17 RX ADMIN — Medication 200 MILLIGRAM(S): at 05:28

## 2021-11-17 RX ADMIN — HEPARIN SODIUM 5000 UNIT(S): 5000 INJECTION INTRAVENOUS; SUBCUTANEOUS at 14:50

## 2021-11-17 RX ADMIN — SODIUM CHLORIDE 50 MILLILITER(S): 9 INJECTION, SOLUTION INTRAVENOUS at 10:37

## 2021-11-17 RX ADMIN — HUMAN INSULIN 13 UNIT(S): 100 INJECTION, SUSPENSION SUBCUTANEOUS at 18:56

## 2021-11-17 RX ADMIN — CEFEPIME 100 MILLIGRAM(S): 1 INJECTION, POWDER, FOR SOLUTION INTRAMUSCULAR; INTRAVENOUS at 14:50

## 2021-11-17 RX ADMIN — Medication 100 MILLIGRAM(S): at 15:44

## 2021-11-17 RX ADMIN — Medication 100 MILLIGRAM(S): at 22:42

## 2021-11-17 RX ADMIN — PANTOPRAZOLE SODIUM 40 MILLIGRAM(S): 20 TABLET, DELAYED RELEASE ORAL at 11:50

## 2021-11-17 RX ADMIN — CHLORHEXIDINE GLUCONATE 1 APPLICATION(S): 213 SOLUTION TOPICAL at 22:43

## 2021-11-17 RX ADMIN — Medication 4: at 19:00

## 2021-11-17 RX ADMIN — HEPARIN SODIUM 5000 UNIT(S): 5000 INJECTION INTRAVENOUS; SUBCUTANEOUS at 22:42

## 2021-11-17 RX ADMIN — Medication 650 MILLIGRAM(S): at 20:00

## 2021-11-17 RX ADMIN — CEFEPIME 100 MILLIGRAM(S): 1 INJECTION, POWDER, FOR SOLUTION INTRAMUSCULAR; INTRAVENOUS at 22:41

## 2021-11-17 RX ADMIN — Medication 25 GRAM(S): at 02:18

## 2021-11-17 RX ADMIN — Medication 25 GRAM(S): at 03:45

## 2021-11-17 RX ADMIN — Medication 200 GRAM(S): at 05:28

## 2021-11-17 RX ADMIN — Medication 8 MILLIGRAM(S): at 22:42

## 2021-11-17 RX ADMIN — CHLORHEXIDINE GLUCONATE 15 MILLILITER(S): 213 SOLUTION TOPICAL at 05:27

## 2021-11-17 RX ADMIN — ATORVASTATIN CALCIUM 40 MILLIGRAM(S): 80 TABLET, FILM COATED ORAL at 22:42

## 2021-11-17 RX ADMIN — Medication 650 MILLIGRAM(S): at 19:15

## 2021-11-17 NOTE — CONSULT NOTE ADULT - ASSESSMENT
61M hx HTN, DM, cardiac stent on ASA, at work complaining of HA ~8:30, starting feeling dizzy and vomiting. Found to have large posterior fossa bleed w/ IVH/SAH/hydro. S/p craniectomy and aneurism resection. Failed extubation, now presenting for tracheostomy. Initially intubated 11/4, PEEP 5/FiO2 30, no pressors, no AC, no fevers, no PPM.  61M hx HTN, DM, cardiac stent on ASA, at work complaining of HA ~8:30, starting feeling dizzy and vomiting. Found to have large posterior fossa bleed w/ IVH/SAH/hydro. S/p craniectomy and aneurism resection. Failed extubation, now presenting for tracheostomy. Initially intubated 11/4, PEEP 5/FiO2 30, no pressors, no AC, no fevers, no PPM.   Risks, benefits and alternatives of tracheostomy discussed with family including but not limited to bleeding possibly massive to injury to great vessels, infection, injury to lungs, esophagus, difficulty swallowing, inability to speak, scarring in the trachea that can obstruct the trachea, airway compromise, mucous plugging/ need for very close tracheal care as well as surgery in a critically ill patient. They elected to proceed   61M hx HTN, DM, cardiac stent on ASA, at work complaining of HA ~8:30, starting feeling dizzy and vomiting. Found to have large posterior fossa bleed w/ IVH/SAH/hydro. S/p craniectomy and aneurism resection. Failed extubation, now presenting for tracheostomy. Initially intubated 11/4, PEEP 5/FiO2 30, no pressors, no AC, no fevers, no PPM. +prominent anterior vessel palpated on neck exam.     Risks, benefits and alternatives of tracheostomy discussed with family including but not limited to bleeding possibly massive to injury to great vessels, infection, injury to lungs, esophagus, difficulty swallowing, inability to speak, scarring in the trachea that can obstruct the trachea, airway compromise, mucous plugging/ need for very close tracheal care as well as surgery in a critically ill patient. They elected to proceed

## 2021-11-17 NOTE — PROGRESS NOTE ADULT - ASSESSMENT
ANGLE ALICIA  61M pmhx HTN, DM at work complaining of HA ~8:30, starting feeling dizzy and vomiting, HTN/keke when EMS got to him. SBP 220s, HR 50s. CTH shows large posterior fossa bleed w/ IVH/SAH/hydro.   -Adm NSCU, q1h neuro check  -EVD discontinued  -postpull CTH stable

## 2021-11-17 NOTE — PROGRESS NOTE ADULT - SUBJECTIVE AND OBJECTIVE BOX
SUBJECTIVE AND OBJECTIVE:  INTERVAL HPI/OVERNIGHT EVENTS:  Unresponsive, following simple commands     DNR on chart:   Allergies    penicillin (Other)    Intolerances    MEDICATIONS  (STANDING):  atorvastatin 40 milliGRAM(s) Oral at bedtime  cefepime   IVPB 2000 milliGRAM(s) IV Intermittent every 8 hours  chlorhexidine 0.12% Liquid 15 milliLiter(s) Oral Mucosa two times a day  chlorhexidine 4% Liquid 1 Application(s) Topical <User Schedule>  dexAMETHasone  Injectable 2 milliGRAM(s) IV Push every 12 hours  dexMEDEtomidine Infusion 0.2 MICROgram(s)/kG/Hr (4.34 mL/Hr) IV Continuous <Continuous>  dextrose 50% Injectable 25 Gram(s) IV Push once  dextrose 50% Injectable 12.5 Gram(s) IV Push once  dextrose 50% Injectable 25 Gram(s) IV Push once  doxazosin 8 milliGRAM(s) Oral at bedtime  heparin   Injectable 5000 Unit(s) SubCutaneous every 8 hours  influenza   Vaccine 0.5 milliLiter(s) IntraMuscular once  insulin lispro (ADMELOG) corrective regimen sliding scale   SubCutaneous every 6 hours  insulin NPH human recombinant 13 Unit(s) SubCutaneous every 6 hours  labetalol 100 milliGRAM(s) Oral every 8 hours  pantoprazole  Injectable 40 milliGRAM(s) IV Push daily    MEDICATIONS  (PRN):  acetaminophen    Suspension .. 650 milliGRAM(s) Oral every 6 hours PRN Temp greater or equal to 38C (100.4F), Mild Pain (1 - 3)  fentaNYL    Injectable 25 MICROGram(s) IV Push every 4 hours PRN Moderate Pain (4 - 6)      ITEMS UNCHECKED ARE NOT PRESENT    PRESENT SYMPTOMS: [x ]Unable to obtain due to poor mentation   Source if other than patient:  [ ]Family   [ ]Team     Pain:  [ ]yes [ ]no  QOL impact -   Location -                    Aggravating factors -  Quality -  Radiation -  Timing-  Severity (0-10 scale):  Minimal acceptable level (0-10 scale):     Dyspnea:                           [ ]Mild [ ]Moderate [ ]Severe  Anxiety:                             [ ]Mild [ ]Moderate [ ]Severe  Fatigue:                             [ ]Mild [ ]Moderate [ ]Severe  Nausea:                             [ ]Mild [ ]Moderate [ ]Severe  Loss of appetite:              [ ]Mild [ ]Moderate [ ]Severe  Constipation:                    [ ]Mild [ ]Moderate [ ]Severe    PAIN AD Score:	0  [email or web address].pdf (Ctrl + left click to view)    Other Symptoms:  [ ]All other review of systems negative     Palliative Performance Status Version 2:    20     %      [email or web address]  PHYSICAL EXAM:  Vital Signs Last 24 Hrs  T(C): 38.1 (17 Nov 2021 11:00), Max: 38.1 (17 Nov 2021 11:00)  T(F): 100.6 (17 Nov 2021 11:00), Max: 100.6 (17 Nov 2021 11:00)  HR: 75 (17 Nov 2021 12:00) (64 - 75)  BP: --  BP(mean): --  RR: 17 (17 Nov 2021 12:00) (9 - 20)  SpO2: 99% (17 Nov 2021 12:00) (99% - 100%) I&O's Summary    16 Nov 2021 07:01  -  17 Nov 2021 07:00  --------------------------------------------------------  IN: 3454.5 mL / OUT: 2780 mL / NET: 674.5 mL    17 Nov 2021 07:01  -  17 Nov 2021 13:14  --------------------------------------------------------  IN: 680 mL / OUT: 1400 mL / NET: -720 mL       GENERAL:  [ ]Alert  [ ]Oriented x   [ ]Lethargic  [ ]Cachexia  [x ]Unarousable  [ ]Verbal  [x ]Non-Verbal  Behavioral:   [ ]Anxiety  [ ]Delirium [ ]Agitation [ ]Other  HEENT:  [ ]Normal   [ ]Dry mouth   [x ]ET Tube/Trach  [ ]Oral lesions  PULMONARY:   [ ]Clear [ ]Tachypnea  [ ]Audible excessive secretions   [ ]Rhonchi        [ ]Right [ ]Left [ ]Bilateral  [ ]Crackles        [ ]Right [ ]Left [ ]Bilateral  [ ]Wheezing     [ ]Right [ ]Left [ ]Bilateral  [ ]Diminished BS [ ] Right [ ]Left [ ]Bilateral  CARDIOVASCULAR:    [ ]Regular [ x]Irregular [ ]Tachy  [ ]Bridger [ ]Murmur [ ]Other  GASTROINTESTINAL:  [ x]Soft  [ ]Distended   [ x]+BS  [ ]Non tender [ ]Tender  [ ]PEG [ ]OGT/ NGT   Last BM:    GENITOURINARY:  [ ]Normal [ x]Incontinent   [ ]Oliguria/Anuria   [ ]Barney  MUSCULOSKELETAL:   [ ]Normal   [ ]Weakness  [x ]Bed/Wheelchair bound [ ]Edema  NEUROLOGIC:   [ ]No focal deficits  [x ] Cognitive impairment  [ ] Dysphagia [ ]Dysarthria [ ] Paresis [ ]Other   SKIN:   [ ]Normal  [ ]Rash   [ ]Pressure ulcer(s) [ ]y [ ]n present on admission    CRITICAL CARE:  [ ]Shock Present  [ ]Septic [ ]Cardiogenic [ ]Neurologic [ ]Hypovolemic  [ ]Vasopressors [ ]Inotropes  [ ]Respiratory failure present [ ]Mechanical Ventilation [ ]Non-invasive ventilatory support [ ]High-Flow  [ ]Acute  [ ]Chronic [ ]Hypoxic  [ ]Hypercarbic [ ]Other  [ ]Other organ failure     LABS:                        7.5    11.54 )-----------( 206      ( 16 Nov 2021 21:46 )             22.9   11-16    139  |  105  |  33<H>  ----------------------------<  86  4.5   |  26  |  1.17    Ca    7.8<L>      16 Nov 2021 21:46  Phos  3.1     11-16  Mg     2.0     11-16          RADIOLOGY & ADDITIONAL STUDIES:      < from: CT Head No Cont (11.16.21 @ 08:28) >        INTERPRETATION:  Noncontrast CT of the brain.    CLINICAL INDICATION:  SOC avm resection    TECHNIQUE : Axial CT scanning of the brain was obtained from the skull base to the vertex without the administration of intravenous contrast. Sagittal and coronal reformats were provided.    COMPARISON: CT brain 11/15/2021    FINDINGS:    Interval removal of right frontal approach ventriculostomy catheter.    Slightly increased hemorrhage layering in the occipital horns.    Ventricles slightly decreased in size, with residual mild ventricular dilatation. No midline shift. Basal cisterns are visualized.    Similar low density right frontoparietal subdural collection measuring 7 mm in greatest depth.    Redemonstration of midline suboccipital craniectomy.    Similar appearing resolving vermian hemorrhage and edema in the cerebellar vermis and mesial cerebellar hemispheres.    IMPRESSION:    Interval removal of right frontal approach ventriculostomy catheter.    Slightly increased hemorrhage layering in the occipital horns.    Ventricles slightly decreased in size, with residual mild ventricular dilatation    Similar low density right frontoparietal subdural collection measuring 7 mm in greatest depth.  Similar appearing resolving vermian hemorrhage and edema in the cerebellar vermis and mesial cerebellar hemispheres.    --- End of Report ---          < end of copied text >      Protein Calorie Malnutrition Present: [ ]mild [ ]moderate [ ]severe [ ]underweight [ ]morbid obesity  https://www.andeal.org/vault/2440/web/files/ONC/Table_Clinical%20Characteristics%20to%20Document%20Malnutrition-White%20JV%20et%20al%202012.pdf    Height (cm): 177.8 (11-10-21 @ 14:00)  Weight (kg): 86.8 (11-10-21 @ 14:00)  BMI (kg/m2): 27.5 (11-10-21 @ 14:00)    [ ]PPSV2 < or = 30%  [ ]significant weight loss [x ]poor nutritional intake [ ]anasarca    [ ]Artificial Nutrition    REFERRALS:   [ ]Chaplaincy  [ ]Hospice  [ ]Child Life  [x ]Social Work  [ ]Case management [ ]Holistic Therapy     Goals of Care Document:

## 2021-11-17 NOTE — PROGRESS NOTE ADULT - ASSESSMENT
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 EVD clamped 11/13      NEURO:   neuro checks q2  - [id number] for cerebral edema  - pain control  - ongoing GOC with family  -CT head stable post p  - waxing and waning exam will obtain EEG; if negative     PULM:  Intubated for airway protection  - CPAP trials - abg   - vent bundle  - chest PT  -GOC for trach     CV:  Afib RVR on admission  - SBP goal 100-160  - cardene PRN to goal  - labetalol 100mg q8h will    RENAL:  Baseline Cr 1.24, currently 1.4  Na goal >145  Goal euvolemia to net +  - trend lytes  - daily IOs  -IVL    GI:   Diet: TF  - GI prophylaxis: PPI while intubated  - Bowel regimen - rectal tube d/c'd   -Peg     ENDO:   A1c 6.4%  - FS goal 120-180  - HISS  -hypoglyemia  decrease NPH  13Uq6     HEME/ONC:  LED negative 11/8  - SCDs  - Chemoppx: heparin SQ    ID:  cefepime end date 11/21 (7 days) for enterobacter PNA    CODE STATUS:  [x] Full Code [] DNR [] DNI [] Palliative/Comfort Care    DISPOSITION:  [x] ICU [] Stroke Unit [] Floor [] EMU [] RCU [] PCU

## 2021-11-17 NOTE — CHART NOTE - NSCHARTNOTEFT_GEN_A_CORE
[facility] EPILEPSY CENTER    ** PRELIMINARY EEG reviewed until  17:50    - No seizures seen so far.      Final report to be completed at the completion of the study tomorrow morning.    -----------------------------  Edgar Sue MD, NIKHIL  Epilepsy Fellow  [facility]  --------------------------------  EEG Reading Room: [phone]  (weekdays)  On Call Service After Hours: [phone]

## 2021-11-17 NOTE — PROGRESS NOTE ADULT - SUBJECTIVE AND OBJECTIVE BOX
24 hr EVENTS:   hypoglycmic s/p d50%    EXAMINATION:  General:  calm  HEENT:  MMM  Neuro:  MELVI to nox, non reactive, +cough/gag ,+overbreathes, no corneals, does not follow commands   Cards:  RRR  Respiratory:  no respiratory distress  Abdomen:  soft  Extremities:  no edema    ICU Vital Signs Last 24 Hrs  T(C): 37.7 (17 Nov 2021 07:00), Max: 37.7 (17 Nov 2021 07:00)  T(F): 99.9 (17 Nov 2021 07:00), Max: 99.9 (17 Nov 2021 07:00)  HR: 75 (17 Nov 2021 10:00) (64 - 75)  ABP: 112/56 (17 Nov 2021 10:00) (100/48 - 142/62)  ABP(mean): 72 (17 Nov 2021 10:00) (63 - 87)  RR: 15 (17 Nov 2021 10:00) (9 - 20)  SpO2: 100% (17 Nov 2021 10:00) (99% - 100%)      11-16-21 @ 07:01  -  11-17-21 @ 07:00  --------------------------------------------------------  IN: 3374.5 mL / OUT: 2780 mL / NET: 594.5 mL        Mode: CPAP with PS, FiO2: 30, PEEP: 5, PS: 10, MAP: 9, PIP: 15    acetaminophen    Suspension .. 650 milliGRAM(s) Oral every 6 hours PRN  atorvastatin 40 milliGRAM(s) Oral at bedtime  cefepime   IVPB      cefepime   IVPB 2000 milliGRAM(s) IV Intermittent every 12 hours  chlorhexidine 0.12% Liquid 15 milliLiter(s) Oral Mucosa two times a day  chlorhexidine 4% Liquid 1 Application(s) Topical <User Schedule>  dexAMETHasone  Injectable 2 milliGRAM(s) IV Push every 12 hours  dexMEDEtomidine Infusion 0.2 MICROgram(s)/kG/Hr (4.34 mL/Hr) IV Continuous <Continuous>  dextrose 50% Injectable 25 Gram(s) IV Push once  dextrose 50% Injectable 12.5 Gram(s) IV Push once  dextrose 50% Injectable 25 Gram(s) IV Push once  doxazosin 4 milliGRAM(s) Oral at bedtime  fentaNYL    Injectable 25 MICROGram(s) IV Push every 4 hours PRN  heparin   Injectable 5000 Unit(s) SubCutaneous every 8 hours  influenza   Vaccine 0.5 milliLiter(s) IntraMuscular once  insulin lispro (ADMELOG) corrective regimen sliding scale   SubCutaneous every 6 hours  insulin NPH human recombinant 22 Unit(s) SubCutaneous every 6 hours  labetalol 200 milliGRAM(s) Oral every 8 hours  lactated ringers. 1000 milliLiter(s) (50 mL/Hr) IV Continuous <Continuous>  pantoprazole  Injectable 40 milliGRAM(s) IV Push daily      LABS:  Na: 139 (11-16 @ 21:46), 140 (11-16 @ 06:36), 141 (11-16 @ 00:19), 142 (11-15 @ 17:47), 142 (11-15 @ 11:16), 142 (11-15 @ 03:25), 143 (11-14 @ 21:19), 143 (11-14 @ 15:16)  K: 4.5 (11-16 @ 21:46), 4.0 (11-16 @ 06:36), 4.6 (11-16 @ 00:19), 4.3 (11-15 @ 17:47), 4.5 (11-15 @ 11:16), 4.3 (11-15 @ 03:25), 4.6 (11-14 @ 21:19), 4.2 (11-14 @ 15:16)  Cl: 105 (11-16 @ 21:46), 105 (11-16 @ 06:36), 106 (11-16 @ 00:19), 108 (11-15 @ 17:47), 107 (11-15 @ 11:16), 106 (11-15 @ 03:25), 107 (11-14 @ 21:19), 109 (11-14 @ 15:16)  CO2: 26 (11-16 @ 21:46), 26 (11-16 @ 06:36), 25 (11-16 @ 00:19), 24 (11-15 @ 17:47), 24 (11-15 @ 11:16), 24 (11-15 @ 03:25), 25 (11-14 @ 21:19), 25 (11-14 @ 15:16)  BUN: 33 (11-16 @ 21:46), 40 (11-16 @ 06:36), 43 (11-16 @ 00:19), 42 (11-15 @ 17:47), 44 (11-15 @ 11:16), 49 (11-15 @ 03:25), 46 (11-14 @ 21:19), 40 (11-14 @ 15:16)  Cr: 1.17 (11-16 @ 21:46), 1.44 (11-16 @ 06:36), 1.48 (11-16 @ 00:19), 1.48 (11-15 @ 17:47), 1.51 (11-15 @ 11:16), 1.94 (11-15 @ 03:25), 1.89 (11-14 @ 21:19), 1.45 (11-14 @ 15:16)  Glu: 86(11-16 @ 21:46), 121(11-16 @ 06:36), 141(11-16 @ 00:19), 128(11-15 @ 17:47), 145(11-15 @ 11:16), 155(11-15 @ 03:25), 128(11-14 @ 21:19), 150(11-14 @ 15:16)    Hgb: 7.5 (11-16 @ 21:46), 7.2 (11-16 @ 06:36), 6.6 (11-16 @ 00:19), 7.6 (11-14 @ 21:19)  Hct: 22.9 (11-16 @ 21:46), 22.7 (11-16 @ 06:36), 20.1 (11-16 @ 00:19), 23.4 (11-14 @ 21:19)  WBC: 11.54 (11-16 @ 21:46), 13.55 (11-16 @ 06:36), 14.67 (11-16 @ 00:19), 20.94 (11-14 @ 21:19)  Plt: 206 (11-16 @ 21:46), 193 (11-16 @ 06:36), 210 (11-16 @ 00:19), 247 (11-14 @ 21:19)    INR: 1.01 11-15-21 @ 11:16  PTT: 30.1 11-15-21 @ 11:16

## 2021-11-17 NOTE — PROGRESS NOTE ADULT - PROBLEM SELECTOR PLAN 4
Wife at bedside, updated on medical status along with Dr. Chairez.  Questions and concerns addressed.  Wife states she will reach out with a decision after she speaks with a family member.   Trach / Peg vs symptom mediated approach

## 2021-11-17 NOTE — CHART NOTE - NSCHARTNOTEFT_GEN_A_CORE
Nutrition Follow Up Note  Patient seen for: Nutrition follow up    Chart reviewed, events noted. Pt is a 62 yo M with PMH: HTN, DM. CTH showed large posterior fossa bleed with IVH/SAH/hydro. 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 EVD clamped 11/13. Remains intubated. Goals of care discussion ongoing. Continues on antibiotics as ordered for enterobacter pneumonia.     Source: [] Patient       [x] EMR        [x] RN        [] Family at bedside       [x] Other: interdisciplinary medical team    -If unable to interview patient: [x] Trach/Vent/BiPAP  [x] Disoriented/confused/inappropriate to interview    Diet Order:   Diet, NPO:   Tube Feeding Modality: Nasogastric  Glucerna 1.2 Blake (GLUCERNARTH)  Total Volume for 24 Hours (mL): 1920  Continuous  Starting Tube Feed Rate {mL per Hour}: 20  Increase Tube Feed Rate by (mL): 10     Every 4 hours  Until Goal Tube Feed Rate (mL per Hour): 80  Tube Feed Duration (in Hours): 24  Tube Feed Start Time: 10:00 (11-08-21)    EN Order Provides: 1920ml, 2304kcal and 115g protein     EN Provision (per nursing flow sheet):   (11/17): EN feeds infusing at goal rate (80ml/hr)  (11/16): 100% of goal   (11/15): 88% of goal  (11/14): 92% of goal  (11/13): 96% of goal  (11/12): 100% of goal    Is current diet order appropriate/adequate? [x] Yes  []  No:     Nutrition-related concerns:  -Free water discontinued 11/16. Continuous Lactated Ringers IVF currently infusing.  -Last BM (11/14). Not on apparent bowel regimen. Senna/Miralax discontinued 11/8.   -Pt prescribed NPH and Insulin Lispro to aid in management of BG. Prescribed Decadron - posing pt at additional risk for elevated FSBG. POCT Blood Glucose <140mg/dL between 11/16-11/7.   -Pt continues on antibiotics. Consider Leon Active twice daily to aid in gut bindu.     Weights:   Daily     MEDICATIONS  (STANDING):  atorvastatin  cefepime   IVPB  cefepime   IVPB  dexAMETHasone  Injectable  dextrose 50% Injectable  dextrose 50% Injectable  dextrose 50% Injectable  doxazosin  insulin lispro (ADMELOG) corrective regimen sliding scale  insulin NPH human recombinant  labetalol  lactated ringers.  pantoprazole  Injectable    Pertinent Labs: 11-16 @ 21:46: Na 139, BUN 33<H>, Cr 1.17, BG 86, K+ 4.5, Phos 3.1, Mg 2.0, Alk Phos --, ALT/SGPT --, AST/SGOT --, HbA1c --    A1C with Estimated Average Glucose Result: 6.5 % (11-04-21 @ 21:43)    Finger Sticks:  POCT Blood Glucose.: 132 mg/dL (11-17 @ 05:10)  POCT Blood Glucose.: 119 mg/dL (11-17 @ 03:50)  POCT Blood Glucose.: 90 mg/dL (11-17 @ 03:30)  POCT Blood Glucose.: 93 mg/dL (11-17 @ 03:00)  POCT Blood Glucose.: 97 mg/dL (11-17 @ 02:41)  POCT Blood Glucose.: 62 mg/dL (11-17 @ 02:12)  POCT Blood Glucose.: 88 mg/dL (11-16 @ 23:31)  POCT Blood Glucose.: 106 mg/dL (11-16 @ 17:00)  POCT Blood Glucose.: 103 mg/dL (11-16 @ 11:29)    Triglycerides, Serum: 73 mg/dL (11-05-21 @ 01:10)    Skin per nursing documentation: surgical incision suboccipital crani, right groin s/p angio  Edema: 1+ generalized, 2+ left hand; right hand    (based on dosing wt 86.8kg):   Estimated Energy Needs: (25-30kcal/kg): 2170-2604kcal  Estimated Protein Needs: (1.2-1.4g protein/kg): 104-122g protein    Previous Nutrition Diagnosis: increased nutrient needs  Nutrition Diagnosis is: [x] ongoing  [] resolved [] not applicable     Nutrition Care Plan:  [x] In Progress  [] Achieved  [] Not applicable       Recommendations:      1. Continue Glucerna 1.2 at 80ml/hr x 24 hrs. To provide (based on dosing wt 86.8kg): 1920ml, 2304kcal (26.5kcal/kg) and 115g protein (1.3g protein/kg).  2. Monitor GI tolerance. RD to remain available to adjust EN formulary, volume/rate PRN.   3. Recommend Leon Active 2x daily in context of antibiotic use.   4. Monitor wt trends/labs/skin integrity/hydration status/bowel regularity.   5. Determine nutritional goals of care.     Monitoring and Evaluation:   Continue to monitor nutritional intake, tolerance to diet prescription, weights, labs, skin integrity    RD remains available upon request and will follow up per protocol Nutrition Follow Up Note  Patient seen for: Nutrition follow up    Chart reviewed, events noted. Pt is a 62 yo M with PMH: HTN, DM. CTH showed large posterior fossa bleed with IVH/SAH/hydro. 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 EVD clamped 11/13. Remains intubated. Goals of care discussion ongoing. Continues on antibiotics as ordered for enterobacter pneumonia.     Source: [] Patient       [x] EMR        [x] RN        [] Family at bedside       [x] Other: interdisciplinary medical team    -If unable to interview patient: [x] Trach/Vent/BiPAP  [x] Disoriented/confused/inappropriate to interview    Diet Order:   Diet, NPO:   Tube Feeding Modality: Nasogastric  Glucerna 1.2 Blake (GLUCERNARTH)  Total Volume for 24 Hours (mL): 1920  Continuous  Starting Tube Feed Rate {mL per Hour}: 20  Increase Tube Feed Rate by (mL): 10     Every 4 hours  Until Goal Tube Feed Rate (mL per Hour): 80  Tube Feed Duration (in Hours): 24  Tube Feed Start Time: 10:00 (11-08-21)    EN Order Provides: 1920ml, 2304kcal and 115g protein     EN Provision (per nursing flow sheet):   (11/17): EN feeds infusing at goal rate (80ml/hr)  (11/16): 100% of goal   (11/15): 88% of goal  (11/14): 92% of goal  (11/13): 96% of goal  (11/12): 100% of goal    Is current diet order appropriate/adequate? [x] Yes  []  No:     Nutrition-related concerns:  -Free water discontinued 11/16. Continuous Lactated Ringers IVF currently infusing.  -Last BM (11/14). Not on apparent bowel regimen. Senna/Miralax discontinued 11/8.   -Pt prescribed NPH and Insulin Lispro to aid in management of BG. Prescribed Decadron - posing pt at additional risk for elevated FSBG. POCT Blood Glucose <140mg/dL between 11/16-11/7.   -Pt continues on antibiotics. Consider Leon Active twice daily to aid in gut bindu.     Weights:   Daily     MEDICATIONS  (STANDING):  atorvastatin  cefepime   IVPB  cefepime   IVPB  dexAMETHasone  Injectable  dextrose 50% Injectable  dextrose 50% Injectable  dextrose 50% Injectable  doxazosin  insulin lispro (ADMELOG) corrective regimen sliding scale  insulin NPH human recombinant  labetalol  lactated ringers.  pantoprazole  Injectable    Pertinent Labs: 11-16 @ 21:46: Na 139, BUN 33<H>, Cr 1.17, BG 86, K+ 4.5, Phos 3.1, Mg 2.0, Alk Phos --, ALT/SGPT --, AST/SGOT --, HbA1c --    A1C with Estimated Average Glucose Result: 6.5 % (11-04-21 @ 21:43)    Finger Sticks:  POCT Blood Glucose.: 132 mg/dL (11-17 @ 05:10)  POCT Blood Glucose.: 119 mg/dL (11-17 @ 03:50)  POCT Blood Glucose.: 90 mg/dL (11-17 @ 03:30)  POCT Blood Glucose.: 93 mg/dL (11-17 @ 03:00)  POCT Blood Glucose.: 97 mg/dL (11-17 @ 02:41)  POCT Blood Glucose.: 62 mg/dL (11-17 @ 02:12)  POCT Blood Glucose.: 88 mg/dL (11-16 @ 23:31)  POCT Blood Glucose.: 106 mg/dL (11-16 @ 17:00)  POCT Blood Glucose.: 103 mg/dL (11-16 @ 11:29)    Triglycerides, Serum: 73 mg/dL (11-05-21 @ 01:10)    Skin per nursing documentation: surgical incision suboccipital crani, right groin s/p angio  Edema: 1+ generalized, 2+ left hand; right hand    (based on dosing wt 86.8kg):   Estimated Energy Needs: (25-30kcal/kg): 2170-2604kcal  Estimated Protein Needs: (1.2-1.4g protein/kg): 104-122g protein    Previous Nutrition Diagnosis: increased nutrient needs  Nutrition Diagnosis is: [x] ongoing  [] resolved [] not applicable     Nutrition Care Plan:  [x] In Progress  [] Achieved  [] Not applicable       Recommendations:      1. Continue Glucerna 1.2 at 80ml/hr x 24 hrs. To provide (based on dosing wt 86.8kg): 1920ml, 2304kcal (26.5kcal/kg) and 115g protein (1.3g protein/kg).  2. Monitor GI tolerance. RD to remain available to adjust EN formulary, volume/rate PRN.   3. Recommend Leon Active 2x daily in context of antibiotic use.   4. Monitor wt trends/labs/skin integrity/hydration status/bowel regularity. Consider resume of bowel regimen?  5. Determine nutritional goals of care.     Monitoring and Evaluation:   Continue to monitor nutritional intake, tolerance to diet prescription, weights, labs, skin integrity    RD remains available upon request and will follow up per protocol

## 2021-11-17 NOTE — PROGRESS NOTE ADULT - SUBJECTIVE AND OBJECTIVE BOX
Patient seen and examined at bedside.    --Anticoagulation--  heparin   Injectable 5000 Unit(s) SubCutaneous every 8 hours    T(C): 37.5 (11-16-21 @ 23:00), Max: 38.2 (11-16-21 @ 05:00)  HR: 64 (11-17-21 @ 00:00) (64 - 106)  BP: --  RR: 20 (11-17-21 @ 00:00) (11 - 34)  SpO2: 99% (11-17-21 @ 00:00) (99% - 100%)  Wt(kg): --    Exam:    intubated, weak corneals, pupils 3 NR, cough, no FC, flaccid

## 2021-11-17 NOTE — CONSULT NOTE ADULT - PROBLEM SELECTOR RECOMMENDATION 9
- Plan for tracheostomy Friday to follow   - Will need preop/consent   - ENT will continue to follow   - Call with questions or concerns

## 2021-11-17 NOTE — PROGRESS NOTE ADULT - SUBJECTIVE AND OBJECTIVE BOX
NEUROCRITICAL CARE EVENING NOTE    DAY EVENTS:    - worse exam this am, not following commands, placed on EEG  - on cefepime d3/7 for tracheobronchitis   - plan for trach/peg friday  - no seizures on EEG prelim read  - hypoglycemic this am, given D50    VITALS/IMAGING/DATA  - Reviewed      ALLERGIES:   - Reviewed      MEDICATIONS:  - Reviewed    EXAMINATION:  PHYSICAL EXAM:    General: calm  CVS: RRR  Pulm: CTAB  GI: Soft, NTND  Extremities: No LE Edema  Neuro: AOx3, PERRL, EOMI, facial symmetrical, fluent speech, motor 5/5 throughout, no PND, sensation in tact             NEUROCRITICAL CARE EVENING NOTE    DAY EVENTS:    - worse exam this am, not wiggling toes on command, placed on EEG  - on cefepime d3/7 for tracheobronchitis   - plan for trach/peg friday  - no seizures on EEG prelim read  - hypoglycemic this am, given D50    VITALS/IMAGING/DATA  - Reviewed      ALLERGIES:   - Reviewed      MEDICATIONS:  - Reviewed    EXAMINATION:  PHYSICAL EXAM:    General: calm  CVS: RRR  Pulm: CTAB  GI: Soft, NTND  Extremities: No LE Edema  Neuro: AOx3, PERRL, EOMI, facial symmetrical, fluent speech, motor 5/5 throughout, no PND, sensation in tact             NEUROCRITICAL CARE EVENING NOTE    DAY EVENTS:    - worse exam this am, not wiggling toes on command, placed on EEG  - on cefepime d3/7 for tracheobronchitis   - plan for trach/peg friday  - no seizures on EEG prelim read  - hypoglycemic this am, given D50    VITALS/IMAGING/DATA  - Reviewed      ALLERGIES:   - Reviewed      MEDICATIONS:  - Reviewed    EXAMINATION:  PHYSICAL EXAM:    General: ett to vent  CVS: RRR  Pulm: CTAB  GI: Soft, NTND  Extremities: No LE Edema  Neuro: MELVI to nox, non reactive, +cough/gag ,+overbreathes, no corneals, does not follow commands

## 2021-11-17 NOTE — CONSULT NOTE ADULT - SUBJECTIVE AND OBJECTIVE BOX
CC: respiratory failure     HPI: 61M hx HTN, DM, cardiac stent on ASA. At work complaining of HA ~8:30, starting feeling dizzy and vomiting, HTN/keke when EMS got to him. SBP 220s, HR 50s. On EMS arrival at 09:39AM 11/4/21, patient seen talking a little, moving all extremities, then quickly became unresponsive. No known blood thinners. CTH shows large posterior fossa bleed w/ IVH/SAH/hydro. Now s/p craniectomy and aneurism resection. Failed extubation, now presenting for tracheostomy. Initially intubated 11/4, PEEP 5/FiO2 30, no pressors, no AC, no fevers, no PPM.       PAST MEDICAL & SURGICAL HISTORY:  HTN (hypertension)    Diabetes mellitus      Allergies    penicillin (Other)    Intolerances      MEDICATIONS  (STANDING):  atorvastatin 40 milliGRAM(s) Oral at bedtime  cefepime   IVPB 2000 milliGRAM(s) IV Intermittent every 8 hours  chlorhexidine 0.12% Liquid 15 milliLiter(s) Oral Mucosa two times a day  chlorhexidine 4% Liquid 1 Application(s) Topical <User Schedule>  dexAMETHasone  Injectable 2 milliGRAM(s) IV Push every 12 hours  dexMEDEtomidine Infusion 0.2 MICROgram(s)/kG/Hr (4.34 mL/Hr) IV Continuous <Continuous>  dextrose 50% Injectable 25 Gram(s) IV Push once  dextrose 50% Injectable 12.5 Gram(s) IV Push once  dextrose 50% Injectable 25 Gram(s) IV Push once  doxazosin 8 milliGRAM(s) Oral at bedtime  heparin   Injectable 5000 Unit(s) SubCutaneous every 8 hours  influenza   Vaccine 0.5 milliLiter(s) IntraMuscular once  insulin lispro (ADMELOG) corrective regimen sliding scale   SubCutaneous every 6 hours  insulin NPH human recombinant 13 Unit(s) SubCutaneous every 6 hours  labetalol 100 milliGRAM(s) Oral every 8 hours  pantoprazole  Injectable 40 milliGRAM(s) IV Push daily    MEDICATIONS  (PRN):  acetaminophen    Suspension .. 650 milliGRAM(s) Oral every 6 hours PRN Temp greater or equal to 38C (100.4F), Mild Pain (1 - 3)  fentaNYL    Injectable 25 MICROGram(s) IV Push every 4 hours PRN Moderate Pain (4 - 6)      Social History: no pertinent social history     Family history: no pertinent family history     ROS:   uto    Vital Signs Last 24 Hrs  T(C): 38.1 (17 Nov 2021 11:00), Max: 38.1 (17 Nov 2021 11:00)  T(F): 100.6 (17 Nov 2021 11:00), Max: 100.6 (17 Nov 2021 11:00)  HR: 75 (17 Nov 2021 12:00) (64 - 75)  BP: --  BP(mean): --  RR: 17 (17 Nov 2021 12:00) (9 - 20)  SpO2: 99% (17 Nov 2021 12:00) (99% - 100%)                          7.5    11.54 )-----------( 206      ( 16 Nov 2021 21:46 )             22.9    11-16    139  |  105  |  33<H>  ----------------------------<  86  4.5   |  26  |  1.17    Ca    7.8<L>      16 Nov 2021 21:46  Phos  3.1     11-16  Mg     2.0     11-16         PHYSICAL EXAM:  Gen: sedated   Skin: No rashes, bruises, or lesions  Head: Normocephalic, Atraumatic  Face: no edema, erythema, or fluctuance. Parotid glands soft without mass  Eyes: no scleral injection  Nose: Nares bilaterally patent, no discharge  Mouth: ETT in place. No Stridor / Drooling / Trismus.  Mucosa moist, tongue/uvula midline, oropharynx clear  Neck: Flat, supple, no lymphadenopathy, trachea midline, no masses  Lymphatic: No lymphadenopathy  Resp: on vent   CV: no peripheral edema/cyanosis  GI: nondistended   Peripheral vascular: no JVD or edema  Neuro: unable to evaluate          CC: respiratory failure     HPI: 61M hx HTN, DM, cardiac stent on ASA. At work complaining of HA ~8:30, starting feeling dizzy and vomiting, HTN/keke when EMS got to him. SBP 220s, HR 50s. On EMS arrival at 09:39AM 11/4/21, patient seen talking a little, moving all extremities, then quickly became unresponsive. No known blood thinners. CTH shows large posterior fossa bleed w/ IVH/SAH/hydro. Now s/p craniectomy and aneurism resection. Failed extubation, now presenting for tracheostomy. Initially intubated 11/4, PEEP 5/FiO2 30, no pressors, no AC, no fevers, no PPM.       PAST MEDICAL & SURGICAL HISTORY:  HTN (hypertension)    Diabetes mellitus      Allergies    penicillin (Other)    Intolerances      MEDICATIONS  (STANDING):  atorvastatin 40 milliGRAM(s) Oral at bedtime  cefepime   IVPB 2000 milliGRAM(s) IV Intermittent every 8 hours  chlorhexidine 0.12% Liquid 15 milliLiter(s) Oral Mucosa two times a day  chlorhexidine 4% Liquid 1 Application(s) Topical <User Schedule>  dexAMETHasone  Injectable 2 milliGRAM(s) IV Push every 12 hours  dexMEDEtomidine Infusion 0.2 MICROgram(s)/kG/Hr (4.34 mL/Hr) IV Continuous <Continuous>  dextrose 50% Injectable 25 Gram(s) IV Push once  dextrose 50% Injectable 12.5 Gram(s) IV Push once  dextrose 50% Injectable 25 Gram(s) IV Push once  doxazosin 8 milliGRAM(s) Oral at bedtime  heparin   Injectable 5000 Unit(s) SubCutaneous every 8 hours  influenza   Vaccine 0.5 milliLiter(s) IntraMuscular once  insulin lispro (ADMELOG) corrective regimen sliding scale   SubCutaneous every 6 hours  insulin NPH human recombinant 13 Unit(s) SubCutaneous every 6 hours  labetalol 100 milliGRAM(s) Oral every 8 hours  pantoprazole  Injectable 40 milliGRAM(s) IV Push daily    MEDICATIONS  (PRN):  acetaminophen    Suspension .. 650 milliGRAM(s) Oral every 6 hours PRN Temp greater or equal to 38C (100.4F), Mild Pain (1 - 3)  fentaNYL    Injectable 25 MICROGram(s) IV Push every 4 hours PRN Moderate Pain (4 - 6)      Social History: no pertinent social history     Family history: no pertinent family history     ROS:   uto    Vital Signs Last 24 Hrs  T(C): 38.1 (17 Nov 2021 11:00), Max: 38.1 (17 Nov 2021 11:00)  T(F): 100.6 (17 Nov 2021 11:00), Max: 100.6 (17 Nov 2021 11:00)  HR: 75 (17 Nov 2021 12:00) (64 - 75)  BP: --  BP(mean): --  RR: 17 (17 Nov 2021 12:00) (9 - 20)  SpO2: 99% (17 Nov 2021 12:00) (99% - 100%)                          7.5    11.54 )-----------( 206      ( 16 Nov 2021 21:46 )             22.9    11-16    139  |  105  |  33<H>  ----------------------------<  86  4.5   |  26  |  1.17    Ca    7.8<L>      16 Nov 2021 21:46  Phos  3.1     11-16  Mg     2.0     11-16         PHYSICAL EXAM:  Gen: sedated   Skin: No rashes, bruises, or lesions  Head: Normocephalic, Atraumatic  Face: no edema, erythema, or fluctuance. Parotid glands soft without mass  Eyes: no scleral injection  Nose: Nares bilaterally patent, no discharge  Mouth: ETT in place. No Stridor / Drooling / Trismus.  Mucosa moist, tongue/uvula midline, oropharynx clear  Neck: +prominent anterior vessel. Flat, supple, no lymphadenopathy, trachea midline, no masses  Lymphatic: No lymphadenopathy  Resp: on vent   CV: no peripheral edema/cyanosis  GI: nondistended   Peripheral vascular: no JVD or edema  Neuro: unable to evaluate

## 2021-11-17 NOTE — CONSULT NOTE ADULT - SUBJECTIVE AND OBJECTIVE BOX
Chief Complaint:  Patient is a 61y old  Male who presents with a chief complaint of AMS (2021 13:07)      Date of service: 21 @ 22:38    HPI:    The patient is a 61 year old man with DM who presented with nausea and vomiting. Fount to have cerebral hemorrhage. Status post intubation and placement of EVD.    The patient cannot provide historical information.     He has been unable to be weaned from the vent.    Allergies:  penicillin (Other)      Home Medications:    Hospital Medications:  acetaminophen    Suspension .. 650 milliGRAM(s) Oral every 6 hours PRN  acetylcysteine 20%  Inhalation 3 milliLiter(s) Inhalation three times a day  atorvastatin 40 milliGRAM(s) Oral at bedtime  cefepime   IVPB 2000 milliGRAM(s) IV Intermittent every 8 hours  chlorhexidine 0.12% Liquid 15 milliLiter(s) Oral Mucosa two times a day  chlorhexidine 4% Liquid 1 Application(s) Topical <User Schedule>  dextrose 50% Injectable 25 Gram(s) IV Push once  dextrose 50% Injectable 12.5 Gram(s) IV Push once  dextrose 50% Injectable 25 Gram(s) IV Push once  doxazosin 8 milliGRAM(s) Oral at bedtime  fentaNYL    Injectable 25 MICROGram(s) IV Push every 4 hours PRN  influenza   Vaccine 0.5 milliLiter(s) IntraMuscular once  insulin lispro (ADMELOG) corrective regimen sliding scale   SubCutaneous every 6 hours  insulin NPH human recombinant 13 Unit(s) SubCutaneous every 6 hours  labetalol 100 milliGRAM(s) Oral every 8 hours  pantoprazole  Injectable 40 milliGRAM(s) IV Push daily  polyethylene glycol 3350 17 Gram(s) Oral daily  senna 2 Tablet(s) Oral at bedtime      PMHX/PSHX:  HTN (hypertension)    Diabetes mellitus        Family history:      Social History:   Denies ethanol use.  Denies illicit drug use.    ROS:     cannot provide    PHYSICAL EXAM:     GENERAL:  Appears stated age, well-groomed, well-nourished, no distress  HEENT:  NC/AT,  conjunctivae anicteric, clear and pink, intubated  NECK: supple, trachea midline  CHEST:  Full & symmetric excursion, no increased effort, breath sounds clear  HEART:  Regular rhythm, no JVD  ABDOMEN:  Soft, non-tender, non-distended, normoactive bowel sounds,  no masses , no hepatosplenomegaly  EXTREMITIES:  no cyanosis,clubbing or edema  SKIN:  No rash, erythema, or, ecchymoses, no jaundice  NEURO:   non-focal, no asterixis  PSYCH: calm, sedated  RECTAL: Deferred      Vital Signs:  Vital Signs Last 24 Hrs  T(C): 37.5 (2021 19:00), Max: 37.7 (2021 11:00)  T(F): 99.5 (2021 19:00), Max: 99.9 (2021 11:00)  HR: 70 (2021 21:20) (61 - 89)  BP: --  BP(mean): --  RR: 16 (2021 19:00) (13 - 17)  SpO2: 100% (2021 21:20) (92% - 100%)  Daily     Daily     LABS: Labs personally reviewed by me:                        7.6    10.89 )-----------( 252      ( 2021 23:29 )             23.2     1117    138  |  101  |  38<H>  ----------------------------<  117<H>  4.3   |  24  |  1.37<H>    Ca    7.7<L>      2021 23:29  Phos  4.5       Mg     2.1               Urinalysis Basic - ( 2021 23:29 )    Color: Light Yellow / Appearance: Clear / S.014 / pH: x  Gluc: x / Ketone: Negative  / Bili: Negative / Urobili: Negative   Blood: x / Protein: Trace / Nitrite: Negative   Leuk Esterase: Negative / RBC: 5 /hpf / WBC 5 /HPF   Sq Epi: x / Non Sq Epi: 1 /hpf / Bacteria: Negative          Imaging personally reviewed by me:

## 2021-11-17 NOTE — PROGRESS NOTE ADULT - ASSESSMENT
62 yo man hx of HTN, DM, admitted 11/4 with HA/N/V followed by unresponsiveness, CT head with posterior fossa hemorrhage c/b severe IVH with casting of the 4th and 3rd ventricles with hydrocephalus, s/p EVD and SOC, 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 EVD clamped 11/13  Palliative called for advance care planning and goals of care

## 2021-11-17 NOTE — CONSULT NOTE ADULT - ASSESSMENT
61 year old man with respiratory failure d/t ICH    1. ICH  -per neurosurgery, s/p EVD    2. Respiratory failure  -for tracheostomy fri, will require long term enteral nutrition  -PEG fri    3. Enterobacter PNA  -on cefepime    4. afib with RVR      I had a prolonged conversation with the patient regarding the hospital course, differential diagnosis, results of diagnostic tests this far, and therapeutic modalities available. Plan of care discussed with the patient after the evaluation. Patient expresses a clear understanding of the plan of care.  125 minutes spent on the total encounter, of which more than fifty percent of the encounter was spent on counseling and/or coordinating care by the attending physician.  Advanced care planning forms were discussed. Code status including forceful chest compressions, defibrillation and intubation were discussed. The risks benefits and alternatives to pertinent gastrointestinal procedures and interventions were discussed in detail and all questions were answered. Duration: 15 Minutes.      Salbador Noel M.D.   Gastroenterology and Hepatology  [address] [place], [place]  Office: [phone]  Cell: [phone] 61 year old man with respiratory failure d/t ICH    1. ICH  -per neurosurgery, s/p EVD    2. Respiratory failure  -for tracheostomy fri, will require long term enteral nutrition  -PEG fri    3. Enterobacter PNA  -on cefepime    4. afib with RVR    5. Anemia, no overt GI bleed  -trend CBC    I had a prolonged conversation with the patient regarding the hospital course, differential diagnosis, results of diagnostic tests this far, and therapeutic modalities available. Plan of care discussed with the patient after the evaluation. Patient expresses a clear understanding of the plan of care.  125 minutes spent on the total encounter, of which more than fifty percent of the encounter was spent on counseling and/or coordinating care by the attending physician.  Advanced care planning forms were discussed. Code status including forceful chest compressions, defibrillation and intubation were discussed. The risks benefits and alternatives to pertinent gastrointestinal procedures and interventions were discussed in detail and all questions were answered. Duration: 15 Minutes.      Salbador Noel M.D.   Gastroenterology and Hepatology  [address] [place], [place]  Office: [phone]  Cell: [phone]

## 2021-11-17 NOTE — PROGRESS NOTE ADULT - ASSESSMENT
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      Neuro:  - neurochecks q2h  - c/w EEG, placed on this am for loss of commands  - c/w Dex 2q12h for edema  -     - -   - SBP goal: 100-160; c/w labetalol 100q8h, rate controlled  - ETT to vent, plan for Trach friday  - TF, plan for PEG friday; c/w PPI while on CCS  - NPH 13q6h; monitor for s/s hypoglycemia  - c/w cefepime for tracheobronchitis end date 11/21  - DVT PPX: SCDs, SQH        Dispo: Patient critically ill due to: ASSESSMENT/PLAN:    60 yo man hx of HTN, DM, admitted 11/4 with HA/N/V followed by unresponsiveness, CT head with posterior fossa hemorrhage c/b severe IVH with casting of the 4th and 3rd ventricles with hydrocephalus, s/p EVD and SOC, 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      Neuro:  - neurochecks q2h  - c/w EEG, placed on this am for loss of commands  - c/w Dex 2q12h for edema  - ongoing GOC    - -   - SBP goal: 100-160; c/w labetalol 100q8h, rate controlled  - ETT to vent, plan for Trach friday  - TF, plan for PEG friday; c/w PPI while on CCS  - NPH 13q6h; monitor for s/s hypoglycemia  - c/w cefepime for tracheobronchitis end date 11/21  - DVT PPX: SCDs, SQH        Dispo: Patient critically ill due to: 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

## 2021-11-18 ENCOUNTER — TRANSCRIPTION ENCOUNTER (OUTPATIENT)
Age: 61
End: 2021-11-18

## 2021-11-18 LAB
APPEARANCE UR: CLEAR — SIGNIFICANT CHANGE UP
BACTERIA # UR AUTO: NEGATIVE — SIGNIFICANT CHANGE UP
BILIRUB UR-MCNC: NEGATIVE — SIGNIFICANT CHANGE UP
COLOR SPEC: SIGNIFICANT CHANGE UP
DIFF PNL FLD: ABNORMAL
EPI CELLS # UR: 1 /HPF — SIGNIFICANT CHANGE UP
GLUCOSE UR QL: NEGATIVE — SIGNIFICANT CHANGE UP
HYALINE CASTS # UR AUTO: 1 /LPF — SIGNIFICANT CHANGE UP (ref 0–2)
KETONES UR-MCNC: NEGATIVE — SIGNIFICANT CHANGE UP
LEUKOCYTE ESTERASE UR-ACNC: NEGATIVE — SIGNIFICANT CHANGE UP
NITRITE UR-MCNC: NEGATIVE — SIGNIFICANT CHANGE UP
PH UR: 6 — SIGNIFICANT CHANGE UP (ref 5–8)
PROT UR-MCNC: SIGNIFICANT CHANGE UP
RBC CASTS # UR COMP ASSIST: 5 /HPF — HIGH (ref 0–4)
SARS-COV-2 RNA SPEC QL NAA+PROBE: SIGNIFICANT CHANGE UP
SP GR SPEC: 1.01 — SIGNIFICANT CHANGE UP (ref 1.01–1.02)
UROBILINOGEN FLD QL: NEGATIVE — SIGNIFICANT CHANGE UP
WBC UR QL: 5 /HPF — SIGNIFICANT CHANGE UP (ref 0–5)

## 2021-11-18 PROCEDURE — 95718 EEG PHYS/QHP 2-12 HR W/VEEG: CPT

## 2021-11-18 PROCEDURE — 71045 X-RAY EXAM CHEST 1 VIEW: CPT | Mod: 26

## 2021-11-18 PROCEDURE — 99291 CRITICAL CARE FIRST HOUR: CPT

## 2021-11-18 RX ORDER — MODAFINIL 200 MG/1
200 TABLET ORAL DAILY
Refills: 0 | Status: DISCONTINUED | OUTPATIENT
Start: 2021-11-19 | End: 2021-11-21

## 2021-11-18 RX ORDER — SENNA PLUS 8.6 MG/1
2 TABLET ORAL AT BEDTIME
Refills: 0 | Status: DISCONTINUED | OUTPATIENT
Start: 2021-11-18 | End: 2021-11-22

## 2021-11-18 RX ORDER — POLYETHYLENE GLYCOL 3350 17 G/17G
17 POWDER, FOR SOLUTION ORAL DAILY
Refills: 0 | Status: DISCONTINUED | OUTPATIENT
Start: 2021-11-18 | End: 2021-11-22

## 2021-11-18 RX ORDER — ACETYLCYSTEINE 200 MG/ML
3 VIAL (ML) MISCELLANEOUS THREE TIMES A DAY
Refills: 0 | Status: DISCONTINUED | OUTPATIENT
Start: 2021-11-18 | End: 2021-11-18

## 2021-11-18 RX ORDER — SODIUM CHLORIDE 9 MG/ML
500 INJECTION, SOLUTION INTRAVENOUS ONCE
Refills: 0 | Status: COMPLETED | OUTPATIENT
Start: 2021-11-18 | End: 2021-11-18

## 2021-11-18 RX ORDER — ACETYLCYSTEINE 200 MG/ML
3 VIAL (ML) MISCELLANEOUS THREE TIMES A DAY
Refills: 0 | Status: COMPLETED | OUTPATIENT
Start: 2021-11-18 | End: 2021-11-19

## 2021-11-18 RX ORDER — CALCIUM GLUCONATE 100 MG/ML
2 VIAL (ML) INTRAVENOUS ONCE
Refills: 0 | Status: COMPLETED | OUTPATIENT
Start: 2021-11-18 | End: 2021-11-18

## 2021-11-18 RX ADMIN — Medication 8: at 12:10

## 2021-11-18 RX ADMIN — PANTOPRAZOLE SODIUM 40 MILLIGRAM(S): 20 TABLET, DELAYED RELEASE ORAL at 12:10

## 2021-11-18 RX ADMIN — HUMAN INSULIN 13 UNIT(S): 100 INJECTION, SUSPENSION SUBCUTANEOUS at 12:11

## 2021-11-18 RX ADMIN — Medication 8 MILLIGRAM(S): at 22:36

## 2021-11-18 RX ADMIN — CHLORHEXIDINE GLUCONATE 15 MILLILITER(S): 213 SOLUTION TOPICAL at 05:26

## 2021-11-18 RX ADMIN — HEPARIN SODIUM 5000 UNIT(S): 5000 INJECTION INTRAVENOUS; SUBCUTANEOUS at 14:06

## 2021-11-18 RX ADMIN — SENNA PLUS 2 TABLET(S): 8.6 TABLET ORAL at 22:36

## 2021-11-18 RX ADMIN — CEFEPIME 100 MILLIGRAM(S): 1 INJECTION, POWDER, FOR SOLUTION INTRAMUSCULAR; INTRAVENOUS at 14:06

## 2021-11-18 RX ADMIN — HUMAN INSULIN 13 UNIT(S): 100 INJECTION, SUSPENSION SUBCUTANEOUS at 00:50

## 2021-11-18 RX ADMIN — Medication 650 MILLIGRAM(S): at 23:30

## 2021-11-18 RX ADMIN — Medication 100 MILLIGRAM(S): at 14:07

## 2021-11-18 RX ADMIN — Medication 4: at 05:25

## 2021-11-18 RX ADMIN — HUMAN INSULIN 13 UNIT(S): 100 INJECTION, SUSPENSION SUBCUTANEOUS at 05:24

## 2021-11-18 RX ADMIN — ATORVASTATIN CALCIUM 40 MILLIGRAM(S): 80 TABLET, FILM COATED ORAL at 22:36

## 2021-11-18 RX ADMIN — Medication 100 MILLIGRAM(S): at 22:36

## 2021-11-18 RX ADMIN — HEPARIN SODIUM 5000 UNIT(S): 5000 INJECTION INTRAVENOUS; SUBCUTANEOUS at 05:26

## 2021-11-18 RX ADMIN — Medication 200 GRAM(S): at 05:25

## 2021-11-18 RX ADMIN — Medication 4: at 17:29

## 2021-11-18 RX ADMIN — Medication 2 MILLIGRAM(S): at 05:26

## 2021-11-18 RX ADMIN — HUMAN INSULIN 13 UNIT(S): 100 INJECTION, SUSPENSION SUBCUTANEOUS at 17:30

## 2021-11-18 RX ADMIN — CEFEPIME 100 MILLIGRAM(S): 1 INJECTION, POWDER, FOR SOLUTION INTRAMUSCULAR; INTRAVENOUS at 05:24

## 2021-11-18 RX ADMIN — CHLORHEXIDINE GLUCONATE 1 APPLICATION(S): 213 SOLUTION TOPICAL at 22:37

## 2021-11-18 RX ADMIN — CEFEPIME 100 MILLIGRAM(S): 1 INJECTION, POWDER, FOR SOLUTION INTRAMUSCULAR; INTRAVENOUS at 22:35

## 2021-11-18 RX ADMIN — Medication 100 MILLIGRAM(S): at 05:25

## 2021-11-18 RX ADMIN — CHLORHEXIDINE GLUCONATE 15 MILLILITER(S): 213 SOLUTION TOPICAL at 17:29

## 2021-11-18 RX ADMIN — Medication 650 MILLIGRAM(S): at 22:30

## 2021-11-18 RX ADMIN — SODIUM CHLORIDE 3000 MILLILITER(S): 9 INJECTION, SOLUTION INTRAVENOUS at 03:31

## 2021-11-18 NOTE — EEG REPORT - NS EEG TEXT BOX
[facility]  Comprehensive Epilepsy Center  Report of Continuous Video EEG    [facility]: [address], [place] [address], Phone [phone]  [facility]: [address] [place], [place] [address], [place] [address], Phone [phone]  [place] Office: [address], [address], [place] [address] Phone [phone]    [facility]: [address] [place] [address], Phone [phone]  [place] [facility]: [address] [place] [address], Phone [phone]    Patient Name: Chon Camacho    Age: 61 year, : 1960  Patient ID: -, MRN #: [id number], King: NSCU Bed 13 NSCU Bed 13  Referring Physician: -  EEG #: [id number]    Study Time/Date: 1:15:34 PM on 2021  	  End Time/Date: 0800 on 2021          			   Duration: 19H    Study Information:    EEG Recording Technique:  The patient underwent continuous Video-EEG monitoring, using Telemetry System hardware on the XLTek Digital System. EEG and video data were stored on a computer hard drive with important events saved in digital archive files. The material was reviewed by a physician (electroencephalographer / epileptologist) on a daily basis. Bhavesh and seizure detection algorithms were utilized and reviewed. An EEG Technician attended to the patient, and was available throughout daytime work hours.  The epilepsy center neurologist was available in person or on call 24-hours per day.    EEG Placement and Labeling of Electrodes:  The EEG was performed utilizing 20 channel referential EEG connections (coronal over temporal over parasagittal montage) using all standard 10-20 electrode placements, with additional electrodes placed in the inferior temporal region using the modified 10-10 montage electrode placements for elective admissions, or if deemed necessary. Recording was at a sampling rate of 256 samples per second per channel. Time synchronized digital video recording was done simultaneously with EEG recording. A low light infrared camera was used for low light recording.     History:   VEEG Performed Bedside  COR:Vented  No HV Due to COVID Protocol  No Photic due to Patient Condition  60 y/o Male PMH Functional Quadriplegia, HTN, Diabetes Mellitus, IVH  p/w AMS        Pertinent Medication  Protonix  Lipitor  Tylenol  Fentanyl  Precedex  Decadron    Interpretation:    Daily EEG Visual Analysis  Findings: The background was continuous and somewhat reactive.   No posterior dominant rhythm seen.  Background predominantly consisted of theta, delta and faster activities.    Focal Slowing:   None were present.    Sleep Background:  Drowsiness and stage II sleep transients were not recorded.    Other Non-Epileptiform Findings:  None were present.    Interictal Epileptiform Activity:   None were present.    Events:  Clinical events: None recorded.  Seizures: None recorded.    Activation Procedures:   Hyperventilation was not performed.    Photic stimulation was not performed.     Artifacts:  Intermittent myogenic and movement artifacts were noted.    EEG Summary / Classification:  Abnormal EEG   - Moderate to severe generalized slowing.    EEG Impression / Clinical Correlate:  Abnormal EEG study.  Moderate to severe nonspecific diffuse or multifocal cerebral dysfunction.   No epileptiform pattern or seizure seen.  ________________________________________    Srikanth Wright MD  Attending Physician, [facility] [place]

## 2021-11-18 NOTE — PROGRESS NOTE ADULT - SUBJECTIVE AND OBJECTIVE BOX
Chief Complaint:  Patient is a 61y old  Male who presents with a chief complaint of AMS (2021 13:07)      Date of service 21 @ 22:47      Interval Events:   no events    Hospital Medications:  acetaminophen    Suspension .. 650 milliGRAM(s) Oral every 6 hours PRN  acetylcysteine 20%  Inhalation 3 milliLiter(s) Inhalation three times a day  atorvastatin 40 milliGRAM(s) Oral at bedtime  cefepime   IVPB 2000 milliGRAM(s) IV Intermittent every 8 hours  chlorhexidine 0.12% Liquid 15 milliLiter(s) Oral Mucosa two times a day  chlorhexidine 4% Liquid 1 Application(s) Topical <User Schedule>  dextrose 50% Injectable 25 Gram(s) IV Push once  dextrose 50% Injectable 12.5 Gram(s) IV Push once  dextrose 50% Injectable 25 Gram(s) IV Push once  doxazosin 8 milliGRAM(s) Oral at bedtime  fentaNYL    Injectable 25 MICROGram(s) IV Push every 4 hours PRN  influenza   Vaccine 0.5 milliLiter(s) IntraMuscular once  insulin lispro (ADMELOG) corrective regimen sliding scale   SubCutaneous every 6 hours  insulin NPH human recombinant 13 Unit(s) SubCutaneous every 6 hours  labetalol 100 milliGRAM(s) Oral every 8 hours  pantoprazole  Injectable 40 milliGRAM(s) IV Push daily  polyethylene glycol 3350 17 Gram(s) Oral daily  senna 2 Tablet(s) Oral at bedtime        Review of Systems:  nonverbal    PHYSICAL EXAM:   Vital Signs:  Vital Signs Last 24 Hrs  T(C): 37.5 (2021 19:00), Max: 37.7 (2021 11:00)  T(F): 99.5 (2021 19:00), Max: 99.9 (2021 11:00)  HR: 70 (2021 21:20) (61 - 89)  BP: --  BP(mean): --  RR: 16 (2021 19:00) (13 - 17)  SpO2: 100% (2021 21:20) (92% - 100%)  Daily     Daily       PHYSICAL EXAM:     GENERAL:  Appears stated age, well-groomed, well-nourished, no distress  HEENT:  NC/AT,  conjunctivae anicteric, clear and pink,   NECK: supple, trachea midline  CHEST:  Full & symmetric excursion, no increased effort, breath sounds clear  HEART:  Regular rhythm, no JVD  ABDOMEN:  Soft, non-tender, non-distended, normoactive bowel sounds,  no masses , no hepatosplenomegaly  EXTREMITIES:  no cyanosis,clubbing or edema  SKIN:  No rash, erythema, or, ecchymoses, no jaundice  NEURO:  non-focal, no asterixis  PSYCH calm, nonverbal  RECTAL: Deferred      LABS Personally reviewed by me:                        7.6    10.89 )-----------( 252      ( 2021 23:29 )             23.2     Mean Cell Volume: 92.1 fl (-21 @ 23:29)        138  |  101  |  38<H>  ----------------------------<  117<H>  4.3   |  24  |  1.37<H>    Ca    7.7<L>      2021 23:29  Phos  4.5       Mg     2.1               Urinalysis Basic - ( 2021 23:29 )    Color: Light Yellow / Appearance: Clear / S.014 / pH: x  Gluc: x / Ketone: Negative  / Bili: Negative / Urobili: Negative   Blood: x / Protein: Trace / Nitrite: Negative   Leuk Esterase: Negative / RBC: 5 /hpf / WBC 5 /HPF   Sq Epi: x / Non Sq Epi: 1 /hpf / Bacteria: Negative                              7.6    10.89 )-----------( 252      ( 2021 23:29 )             23.2                         7.5    11.54 )-----------( 206      ( 2021 21:46 )             22.9                         7.2    13.55 )-----------( 193      ( 2021 06:36 )             22.7                         6.6    14.67 )-----------( 210      ( 2021 00:19 )             20.1       Imaging personally reviewed by me:

## 2021-11-18 NOTE — PROGRESS NOTE ADULT - SUBJECTIVE AND OBJECTIVE BOX
24 hr EVENTS:       EXAMINATION:  General:  calm  HEENT:  MMM  Neuro:  MELVI to nox, non reactive, +cough/gag ,+overbreathes, no corneals, does not follow commands, localizing left upper   Cards:  RRR  Respiratory:  no respiratory distress  Abdomen:  soft  Extremities:  no edema      ICU Vital Signs Last 24 Hrs  T(C): 37 (18 Nov 2021 03:00), Max: 38.8 (17 Nov 2021 19:00)  T(F): 98.6 (18 Nov 2021 03:00), Max: 101.8 (17 Nov 2021 19:00)  HR: 79 (18 Nov 2021 04:00) (60 - 90)  ABP: 112/48 (18 Nov 2021 04:00) (99/46 - 149/64)  ABP(mean): 64 (18 Nov 2021 04:00) (62 - 89)  RR: 16 (18 Nov 2021 04:00) (9 - 19)  SpO2: 100% (18 Nov 2021 04:00) (98% - 100%)      11-16-21 @ 07:01  -  11-17-21 @ 07:00  --------------------------------------------------------  IN: 3461 mL / OUT: 2780 mL / NET: 681 mL    11-17-21 @ 07:01  -  11-18-21 @ 06:26  --------------------------------------------------------  IN: 719.3 mL / OUT: 2800 mL / NET: -2080.7 mL        Mode: AC/ CMV (Assist Control/ Continuous Mandatory Ventilation), RR (machine): 16, TV (machine): 450, FiO2: 30, PEEP: 5, ITime: 1, MAP: 9, PIP: 18    acetaminophen    Suspension .. 650 milliGRAM(s) Oral every 6 hours PRN  atorvastatin 40 milliGRAM(s) Oral at bedtime  cefepime   IVPB 2000 milliGRAM(s) IV Intermittent every 8 hours  chlorhexidine 0.12% Liquid 15 milliLiter(s) Oral Mucosa two times a day  chlorhexidine 4% Liquid 1 Application(s) Topical <User Schedule>  dexAMETHasone  Injectable 2 milliGRAM(s) IV Push every 12 hours  dexMEDEtomidine Infusion 0.2 MICROgram(s)/kG/Hr (4.34 mL/Hr) IV Continuous <Continuous>  dextrose 50% Injectable 25 Gram(s) IV Push once  dextrose 50% Injectable 12.5 Gram(s) IV Push once  dextrose 50% Injectable 25 Gram(s) IV Push once  doxazosin 8 milliGRAM(s) Oral at bedtime  fentaNYL    Injectable 25 MICROGram(s) IV Push every 4 hours PRN  heparin   Injectable 5000 Unit(s) SubCutaneous every 8 hours  influenza   Vaccine 0.5 milliLiter(s) IntraMuscular once  insulin lispro (ADMELOG) corrective regimen sliding scale   SubCutaneous every 6 hours  insulin NPH human recombinant 13 Unit(s) SubCutaneous every 6 hours  labetalol 100 milliGRAM(s) Oral every 8 hours  pantoprazole  Injectable 40 milliGRAM(s) IV Push daily      LABS:  Na: 138 (11-17 @ 23:29), 139 (11-16 @ 21:46), 140 (11-16 @ 06:36), 141 (11-16 @ 00:19), 142 (11-15 @ 17:47), 142 (11-15 @ 11:16)  K: 4.3 (11-17 @ 23:29), 4.5 (11-16 @ 21:46), 4.0 (11-16 @ 06:36), 4.6 (11-16 @ 00:19), 4.3 (11-15 @ 17:47), 4.5 (11-15 @ 11:16)  Cl: 101 (11-17 @ 23:29), 105 (11-16 @ 21:46), 105 (11-16 @ 06:36), 106 (11-16 @ 00:19), 108 (11-15 @ 17:47), 107 (11-15 @ 11:16)  CO2: 24 (11-17 @ 23:29), 26 (11-16 @ 21:46), 26 (11-16 @ 06:36), 25 (11-16 @ 00:19), 24 (11-15 @ 17:47), 24 (11-15 @ 11:16)  BUN: 38 (11-17 @ 23:29), 33 (11-16 @ 21:46), 40 (11-16 @ 06:36), 43 (11-16 @ 00:19), 42 (11-15 @ 17:47), 44 (11-15 @ 11:16)  Cr: 1.37 (11-17 @ 23:29), 1.17 (11-16 @ 21:46), 1.44 (11-16 @ 06:36), 1.48 (11-16 @ 00:19), 1.48 (11-15 @ 17:47), 1.51 (11-15 @ 11:16)  Glu: 117(11-17 @ 23:29), 86(11-16 @ 21:46), 121(11-16 @ 06:36), 141(11-16 @ 00:19), 128(11-15 @ 17:47), 145(11-15 @ 11:16)    Hgb: 7.6 (11-17 @ 23:29), 7.5 (11-16 @ 21:46), 7.2 (11-16 @ 06:36), 6.6 (11-16 @ 00:19)  Hct: 23.2 (11-17 @ 23:29), 22.9 (11-16 @ 21:46), 22.7 (11-16 @ 06:36), 20.1 (11-16 @ 00:19)  WBC: 10.89 (11-17 @ 23:29), 11.54 (11-16 @ 21:46), 13.55 (11-16 @ 06:36), 14.67 (11-16 @ 00:19)  Plt: 252 (11-17 @ 23:29), 206 (11-16 @ 21:46), 193 (11-16 @ 06:36), 210 (11-16 @ 00:19)    INR: 1.01 11-15-21 @ 11:16  PTT: 30.1 11-15-21 @ 11:16                   24 hr EVENTS: no acute events, EEG negative       EXAMINATION:  General:  calm  HEENT:  MMM  Neuro:  MELVI to nox, non reactive, +cough/gag ,+overbreathes, no corneals, does not follow commands, localizing left upper   Cards:  RRR  Respiratory:  no respiratory distress  Abdomen:  soft  Extremities:  no edema      ICU Vital Signs Last 24 Hrs  T(C): 37 (18 Nov 2021 03:00), Max: 38.8 (17 Nov 2021 19:00)  T(F): 98.6 (18 Nov 2021 03:00), Max: 101.8 (17 Nov 2021 19:00)  HR: 79 (18 Nov 2021 04:00) (60 - 90)  ABP: 112/48 (18 Nov 2021 04:00) (99/46 - 149/64)  ABP(mean): 64 (18 Nov 2021 04:00) (62 - 89)  RR: 16 (18 Nov 2021 04:00) (9 - 19)  SpO2: 100% (18 Nov 2021 04:00) (98% - 100%)      11-16-21 @ 07:01  -  11-17-21 @ 07:00  --------------------------------------------------------  IN: 3461 mL / OUT: 2780 mL / NET: 681 mL    11-17-21 @ 07:01  -  11-18-21 @ 06:26  --------------------------------------------------------  IN: 719.3 mL / OUT: 2800 mL / NET: -2080.7 mL        Mode: AC/ CMV (Assist Control/ Continuous Mandatory Ventilation), RR (machine): 16, TV (machine): 450, FiO2: 30, PEEP: 5, ITime: 1, MAP: 9, PIP: 18    acetaminophen    Suspension .. 650 milliGRAM(s) Oral every 6 hours PRN  atorvastatin 40 milliGRAM(s) Oral at bedtime  cefepime   IVPB 2000 milliGRAM(s) IV Intermittent every 8 hours  chlorhexidine 0.12% Liquid 15 milliLiter(s) Oral Mucosa two times a day  chlorhexidine 4% Liquid 1 Application(s) Topical <User Schedule>  dexAMETHasone  Injectable 2 milliGRAM(s) IV Push every 12 hours  dexMEDEtomidine Infusion 0.2 MICROgram(s)/kG/Hr (4.34 mL/Hr) IV Continuous <Continuous>  dextrose 50% Injectable 25 Gram(s) IV Push once  dextrose 50% Injectable 12.5 Gram(s) IV Push once  dextrose 50% Injectable 25 Gram(s) IV Push once  doxazosin 8 milliGRAM(s) Oral at bedtime  fentaNYL    Injectable 25 MICROGram(s) IV Push every 4 hours PRN  heparin   Injectable 5000 Unit(s) SubCutaneous every 8 hours  influenza   Vaccine 0.5 milliLiter(s) IntraMuscular once  insulin lispro (ADMELOG) corrective regimen sliding scale   SubCutaneous every 6 hours  insulin NPH human recombinant 13 Unit(s) SubCutaneous every 6 hours  labetalol 100 milliGRAM(s) Oral every 8 hours  pantoprazole  Injectable 40 milliGRAM(s) IV Push daily      LABS:  Na: 138 (11-17 @ 23:29), 139 (11-16 @ 21:46), 140 (11-16 @ 06:36), 141 (11-16 @ 00:19), 142 (11-15 @ 17:47), 142 (11-15 @ 11:16)  K: 4.3 (11-17 @ 23:29), 4.5 (11-16 @ 21:46), 4.0 (11-16 @ 06:36), 4.6 (11-16 @ 00:19), 4.3 (11-15 @ 17:47), 4.5 (11-15 @ 11:16)  Cl: 101 (11-17 @ 23:29), 105 (11-16 @ 21:46), 105 (11-16 @ 06:36), 106 (11-16 @ 00:19), 108 (11-15 @ 17:47), 107 (11-15 @ 11:16)  CO2: 24 (11-17 @ 23:29), 26 (11-16 @ 21:46), 26 (11-16 @ 06:36), 25 (11-16 @ 00:19), 24 (11-15 @ 17:47), 24 (11-15 @ 11:16)  BUN: 38 (11-17 @ 23:29), 33 (11-16 @ 21:46), 40 (11-16 @ 06:36), 43 (11-16 @ 00:19), 42 (11-15 @ 17:47), 44 (11-15 @ 11:16)  Cr: 1.37 (11-17 @ 23:29), 1.17 (11-16 @ 21:46), 1.44 (11-16 @ 06:36), 1.48 (11-16 @ 00:19), 1.48 (11-15 @ 17:47), 1.51 (11-15 @ 11:16)  Glu: 117(11-17 @ 23:29), 86(11-16 @ 21:46), 121(11-16 @ 06:36), 141(11-16 @ 00:19), 128(11-15 @ 17:47), 145(11-15 @ 11:16)    Hgb: 7.6 (11-17 @ 23:29), 7.5 (11-16 @ 21:46), 7.2 (11-16 @ 06:36), 6.6 (11-16 @ 00:19)  Hct: 23.2 (11-17 @ 23:29), 22.9 (11-16 @ 21:46), 22.7 (11-16 @ 06:36), 20.1 (11-16 @ 00:19)  WBC: 10.89 (11-17 @ 23:29), 11.54 (11-16 @ 21:46), 13.55 (11-16 @ 06:36), 14.67 (11-16 @ 00:19)  Plt: 252 (11-17 @ 23:29), 206 (11-16 @ 21:46), 193 (11-16 @ 06:36), 210 (11-16 @ 00:19)    INR: 1.01 11-15-21 @ 11:16  PTT: 30.1 11-15-21 @ 11:16

## 2021-11-18 NOTE — PROGRESS NOTE ADULT - ASSESSMENT
· Assessment	  61 year old man with respiratory failure d/t ICH    1. ICH  -per neurosurgery, s/p EVD    2. Respiratory failure  -for tracheostomy fri, will require long term enteral nutrition  -PEG fri    3. Enterobacter PNA  -on cefepime    4. afib with RVR    5. Anemia, no overt GI bleed  -trend CBC

## 2021-11-18 NOTE — PROGRESS NOTE ADULT - ASSESSMENT
ASSESSMENT/PLAN:    61M admitted 11/4 w/ HA;  CT head with posterior fossa hemorrhage c/b severe IVH with casting of the 4th and 3rd ventricles with hydrocephalus, s/p EVD and SOC, w/ angio s/p PICA aneurysm resection, planned for trach/peg  ICH score 4    Neuro:  - neurochecks q4h  - s/p EEG without seizures  - s/p Dex taper for edema  - ongoing GOC  - modafinil 200mg    - -   - SBP goal: 100-160; c/w labetalol 100q8h, rate controlled  - ETT to vent, plan for Trach friday; RCU consult  - NPO MN for Trach/PEG friday  - NPH 13q6h; monitor for s/s hypoglycemia; can decrease standing NPH by 30-50% while NPO for PEG  - c/w cefepime for tracheobronchitis end date 11/21  - DVT PPX: SCDs, SQH        Dispo: Patient critically ill due to: acute resp failure, cerebral edema ASSESSMENT/PLAN:    61M admitted 11/4 w/ HA;  CT head with posterior fossa hemorrhage c/b severe IVH with casting of the 4th and 3rd ventricles with hydrocephalus, s/p EVD and SOC, w/ angio s/p PICA aneurysm resection, planned for trach/peg  ICH score 4    Neuro:  - neurochecks q4h  - s/p EEG without seizures  - s/p Dex taper for edema  - ongoing GOC  - modafinil 200mg    - -   - SBP goal: 100-160; c/w labetalol 100q8h, rate controlled  - ETT to vent, plan for Trach friday; RCU consult  - NPO MN for Trach/PEG friday  - NPH 13q6h; monitor for s/s hypoglycemia; can decrease standing NPH by 50% while NPO for PEG  - c/w cefepime for tracheobronchitis end date 11/21  - DVT PPX: SCDs, SQH        Dispo: Patient critically ill due to: acute resp failure, cerebral edema 60 yo man admitted 11/4 w/ HA;  CT head with posterior fossa hemorrhage c/b severe IVH with casting of the 4th and 3rd ventricles with hydrocephalus, s/p EVD and SOC, s/p PICA aneurysm resection, planned for trach/peg.  ICH score 4    Neuro:  - neurochecks q4h  - s/p EEG without seizures  - s/p Dex taper for edema  - ongoing GOC  - modafinil 200mg    - -   - SBP goal: 100-160; c/w labetalol 100q8h, rate controlled  - ETT to vent, plan for Trach friday; RCU consult  - NPO MN for Trach/PEG friday  - NPH 13q6h; monitor for s/s hypoglycemia; can decrease standing NPH by 50% while NPO for PEG  - c/w cefepime for tracheobronchitis end date 11/21  - DVT PPX: SCDs, SQH stopped to trach/PEG

## 2021-11-18 NOTE — PROGRESS NOTE ADULT - ASSESSMENT
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 EVD clamped 11/13      NEURO:   neuro checks q2  - [id number] for cerebral edema  - pain control  - ongoing GOC with family  -CT head stable post p  - waxing and waning exam will obtain EEG; if negative     PULM:  Intubated for airway protection  - CPAP trials - abg   - vent bundle  - chest PT  -GOC for trach     CV:  Afib RVR on admission  - SBP goal 100-160  - cardene PRN to goal  - labetalol 100mg q8h will    RENAL:  Baseline Cr 1.24, currently 1.4  Na goal >145  Goal euvolemia to net +  - trend lytes  - daily IOs  -IVL    GI:   Diet: TF  - GI prophylaxis: PPI while intubated  - Bowel regimen - rectal tube d/c'd   -Peg     ENDO:   A1c 6.4%  - FS goal 120-180  - HISS  -hypoglyemia  decrease NPH  13Uq6     HEME/ONC:  LED negative 11/8  - SCDs  - Chemoppx: heparin SQ    ID:  cefepime end date 11/21 (7 days) for enterobacter PNA    CODE STATUS:  [x] Full Code [] DNR [] DNI [] Palliative/Comfort Care    DISPOSITION:  [x] ICU [] Stroke Unit [] Floor [] EMU [] RCU [] PCU   ASSESSMENT: 60 yo man hx of HTN, DM, admitted 11/4 with HA/N/V followed by unresponsiveness, CT head with posterior fossa hemorrhage c/b severe IVH with casting of the 4th and 3rd ventricles with hydrocephalus, s/p EVD and SOC, 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 EVD clamped 11/13      NEURO:   neuro checks q4  - d/c Dex  - pain control  - ongoing GOC with family  - waxing and waning exam will obtain EEG;  negative read negative will d/c   - start modafinil 200mg in AM     PULM:  Intubated for airway protection  - CPAP trials - abg   - vent bundle  - chest PT/ Mucomyst   - trach plan for Friday - ent following   -RCU c/s     CV:  Afib RVR on admission  - SBP goal 100-160  - cardene PRN to goal  - labetalol 100mg q8h will  -keep rajani until after procedure friday    RENAL:  Baseline Cr 1.24, currently 1.4  Na goal >145  Goal euvolemia to net +  - trend lytes  - daily IOs  -IVL    GI:   Diet: TF @ 80  - GI prophylaxis: PPI while intubated  - Bowel regimen - senna   -Peg friday   -NPO after midnight     ENDO:   A1c 6.4%  - FS goal 120-180  - HISS  -hypoglyemia  decrease NPH  13Uq6     HEME/ONC:  LED negative 11/8  - SCDs  - Chemoppx: heparin SQ    ID:  cefepime end date 11/21 (7 days) for enterobacter PNA    CODE STATUS:  [x] Full Code [] DNR [] DNI [] Palliative/Comfort Care    DISPOSITION:  [x] ICU [] Stroke Unit [] Floor [] EMU [] RCU [] PCU   ASSESSMENT: 62 yo man hx of HTN, DM, admitted 11/4 with HA/N/V followed by unresponsiveness, CT head with posterior fossa hemorrhage c/b severe IVH with casting of the 4th and 3rd ventricles with hydrocephalus, s/p EVD and SOC, 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 EVD clamped 11/13      NEURO:  ICH score: 4   neuro checks q4  - d/c Dex  - pain control  - ongoing GOC with family  - waxing and waning exam will obtain EEG;  negative read negative will d/c   - start modafinil 200mg in AM     PULM:  Intubated for airway protection  - CPAP trials - abg   - vent bundle  - chest PT/ Mucomyst   - trach plan for Friday - ent following   -RCU c/s     CV:  Afib RVR on admission  - SBP goal 100-160  - cardene PRN to goal  - labetalol 100mg q8h will  -keep rajani until after procedure friday    RENAL:  Baseline Cr 1.24, currently 1.4  Na goal >145  Goal euvolemia to net +  - trend lytes  - daily IOs  -IVL    GI:   Diet: TF @ 80  - GI prophylaxis: PPI while intubated  - Bowel regimen - senna   -Peg friday   -NPO after midnight     ENDO:   A1c 6.4%  - FS goal 120-180  - HISS  -hypoglyemia  decrease NPH  13Uq6     HEME/ONC:  LED negative 11/8  - SCDs  - Chemoppx: heparin SQ    ID:  cefepime end date 11/21 (7 days) for enterobacter PNA    CODE STATUS:  [x] Full Code [] DNR [] DNI [] Palliative/Comfort Care    DISPOSITION:  [x] ICU [] Stroke Unit [] Floor [] EMU [] RCU [] PCU   ASSESSMENT: 60 yo man hx of HTN, DM, admitted 11/4 with HA/N/V followed by unresponsiveness, CT head with posterior fossa hemorrhage c/b severe IVH with casting of the 4th and 3rd ventricles with hydrocephalus, s/p EVD and SOC, 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 EVD clamped 11/13      NEURO:  ICH score on admission: 4   neuro checks q4  - d/c Dex  - pain control  - ongoing GOC with family  - waxing and waning exam will obtain EEG;  negative read negative will d/c   - start modafinil 200mg in AM     PULM:  Intubated for airway protection  - CPAP trials - abg   - vent bundle  - chest PT/ Mucomyst   - trach plan for Friday - ent following   -RCU c/s     CV:  Afib RVR on admission  - SBP goal 100-160  - cardene PRN to goal  - labetalol 100mg q8h will  -keep rajani until after procedure friday    RENAL:  Baseline Cr 1.24, currently 1.4  Na goal >145  Goal euvolemia to net +  - trend lytes  - daily IOs  -IVL    GI:   Diet: TF @ 80  - GI prophylaxis: PPI while intubated  - Bowel regimen - senna   -Peg friday   -NPO after midnight     ENDO:   A1c 6.4%  - FS goal 120-180  - HISS  -hypoglyemia  decrease NPH  13Uq6     HEME/ONC:  LED negative 11/8  - SCDs  - Chemoppx: heparin SQ    ID:  cefepime end date 11/21 (7 days) for enterobacter PNA    CODE STATUS:  [x] Full Code [] DNR [] DNI [] Palliative/Comfort Care    DISPOSITION:  [x] ICU [] Stroke Unit [] Floor [] EMU [] RCU [] PCU

## 2021-11-18 NOTE — PROGRESS NOTE ADULT - ATTENDING COMMENTS
Patient seen and examined by attending on 11/18/2021.    Elyssa Hernandez  Neurocritical Care Attending

## 2021-11-18 NOTE — PROGRESS NOTE ADULT - ASSESSMENT
ANGLE ALICIA  61M pmhx HTN, DM at work complaining of HA ~8:30, starting feeling dizzy and vomiting, HTN/keke when EMS got to him. SBP 220s, HR 50s. CTH shows large posterior fossa bleed w/ IVH/SAH/hydro.   -Adm NSCU, q1h neuro check  -EVD discontinued  -GOC, trach/peg vs comfort

## 2021-11-18 NOTE — PROGRESS NOTE ADULT - SUBJECTIVE AND OBJECTIVE BOX
NEUROCRITICAL CARE EVENING NOTE    DAY EVENTS:    - no seizures on EEG, discontinued  - d4/7 cefepime for tracheobronchitis  - plan for trach/peg tomorrow      VITALS/IMAGING/DATA    T(C): 37.5 (11-18-21 @ 19:00), Max: 38 (11-17-21 @ 20:00)  T(F): 99.5 (11-18-21 @ 19:00), Max: 100.4 (11-17-21 @ 20:00)  HR: 68 (11-18-21 @ 19:00) (60 - 89)  BP: --  ABP: 133/57 (11-18-21 @ 19:00) (99/46 - 165/71)  ABP(mean): 76 (11-18-21 @ 19:00) (62 - 98)  RR: 16 (11-18-21 @ 19:00) (13 - 17)  SpO2: 100% (11-18-21 @ 19:00) (92% - 100%)      COVID  11-09-21 @ 01:21  COVID -   [facility]  11-04-21 @ 10:27  [facility]      COVID Biomarkers            Trend Cardiac Enzymes    Trend BNP    Procalcitonin Trend  11-12-21 @ 19:21   -   0.17<H>    WBC Trend  11-17-21 @ 23:29   -  10.89<H>  11-16-21 @ 21:46   -  11.54<H>  11-16-21 @ 06:36   -  13.55<H>  11-16-21 @ 00:19   -  14.67<H>    H/H Trend  11-17-21 @ 23:29   -   7.6<L>/ 23.2<L>  11-16-21 @ 21:46   -   7.5<L>/ 22.9<L>  11-16-21 @ 06:36   -   7.2<L>/ 22.7<L>  11-16-21 @ 00:19   -   6.6<LL>/ 20.1<LL>  11-14-21 @ 21:19   -   7.6<L>/ 23.4<L>  11-13-21 @ 20:55   -   7.7<L>/ 24.3<L>    Stool Occult Blood    Platelet Trend  11-17-21 @ 23:29   -  252  11-16-21 @ 21:46   -  206  11-16-21 @ 06:36   -  193  11-16-21 @ 00:19   -  210    Trend Sodium  11-17-21 @ 23:29   -  138  11-16-21 @ 21:46   -  139  11-16-21 @ 06:36   -  140  11-16-21 @ 00:19   -  141    Trend Potassium  11-17-21 @ 23:29   -  4.3  11-16-21 @ 21:46   -  4.5  11-16-21 @ 06:36   -  4.0  11-16-21 @ 00:19   -  4.6    Trend Bun/Cr  11-17-21 @ 23:29  BUN/CR -  38<H> / 1.37<H>  11-16-21 @ 21:46  BUN/CR -  33<H> / 1.17  11-16-21 @ 06:36  BUN/CR -  40<H> / 1.44<H>  11-16-21 @ 00:19  BUN/CR -  43<H> / 1.48<H>    Lactic Acid Trend    ABG Trend  11-14-21 @ 01:56   - 7.56<H>/31<L>/179<H>/100.0<H>  11-10-21 @ 15:45   - 7.44/38/254<H>/99.5<H>  11-08-21 @ 19:52   - 7.48<H>/35/102/99.2<H>  11-07-21 @ 07:33   - 7.44/35/132<H>/99.7<H>  11-04-21 @ 23:50   - 7.44/34<L>/107/99.1<H>  11-04-21 @ 18:59   - 7.44/33<L>/242<H>/99.3<H>  11-04-21 @ 16:22   - 7.39/41/286<H>/99.9<H>    Trend AST/ALT/ALK Phos/Bili  11-08-21 @ 20:07   23/32/92/0.5  11-04-21 @ 10:16   18/14/94/0.4      Ammonia Trend      Amylase / Lipase Trend      Albumin Trend  11-08-21 @ 20:07   -   3.2<L>  11-04-21 @ 10:16   -   4.8      PTT - PT - INR Trend  11-15-21 @ 11:16   -   30.1 - 12.1 - 1.01  11-09-21 @ 01:19   -   30.4 - 12.1 - 1.01  11-07-21 @ 21:32   -   29.1 - 11.6 - 0.97  11-04-21 @ 10:16   -   27.7 - 11.5 - 0.96    Glucose Trend  11-18-21 @ 17:20   -  -- -- 167<H>  11-18-21 @ 12:05   -  -- -- 221<H>  11-18-21 @ 05:13   -  -- -- 183<H>  11-18-21 @ 00:43   -  -- -- 119<H>  11-17-21 @ 23:29   -  -- 117<H> --  11-17-21 @ 18:51   -  -- -- 168<H>  11-17-21 @ 11:23   -  -- -- 202<H>  11-17-21 @ 05:10   -  -- -- 132<H>  11-17-21 @ 03:50   -  -- -- 119<H>  11-17-21 @ 03:30   -  -- -- 90    A1C with Estimated Average Glucose Result: 6.5 % *H* [4.0 - 5.6] (11-04-21 @ 21:43)        ALLERGIES:   - Reviewed      MEDICATIONS:  MEDICATIONS  (STANDING):  acetylcysteine 20%  Inhalation 3 milliLiter(s) Inhalation three times a day  atorvastatin 40 milliGRAM(s) Oral at bedtime  cefepime   IVPB 2000 milliGRAM(s) IV Intermittent every 8 hours  chlorhexidine 0.12% Liquid 15 milliLiter(s) Oral Mucosa two times a day  chlorhexidine 4% Liquid 1 Application(s) Topical <User Schedule>  dextrose 50% Injectable 25 Gram(s) IV Push once  dextrose 50% Injectable 12.5 Gram(s) IV Push once  dextrose 50% Injectable 25 Gram(s) IV Push once  doxazosin 8 milliGRAM(s) Oral at bedtime  influenza   Vaccine 0.5 milliLiter(s) IntraMuscular once  insulin lispro (ADMELOG) corrective regimen sliding scale   SubCutaneous every 6 hours  insulin NPH human recombinant 13 Unit(s) SubCutaneous every 6 hours  labetalol 100 milliGRAM(s) Oral every 8 hours  pantoprazole  Injectable 40 milliGRAM(s) IV Push daily  senna 2 Tablet(s) Oral at bedtime        PHYSICAL EXAM:    General: calm  CVS: RRR  Pulm: CTAB  GI: Soft, NTND  Extremities: No LE Edema  Neuro: AOx3, PERRL, EOMI, facial symmetrical, fluent speech, motor 5/5 throughout, no PND, sensation in tact             NEUROCRITICAL CARE EVENING NOTE    DAY EVENTS:    - no seizures on EEG, discontinued  - d4/7 cefepime for tracheobronchitis  - plan for trach/peg tomorrow      VITALS/IMAGING/DATA    T(C): 37.5 (11-18-21 @ 19:00), Max: 38 (11-17-21 @ 20:00)  T(F): 99.5 (11-18-21 @ 19:00), Max: 100.4 (11-17-21 @ 20:00)  HR: 68 (11-18-21 @ 19:00) (60 - 89)  BP: --  ABP: 133/57 (11-18-21 @ 19:00) (99/46 - 165/71)  ABP(mean): 76 (11-18-21 @ 19:00) (62 - 98)  RR: 16 (11-18-21 @ 19:00) (13 - 17)  SpO2: 100% (11-18-21 @ 19:00) (92% - 100%)      COVID  11-09-21 @ 01:21  COVID -   [facility]  11-04-21 @ 10:27  [facility]      COVID Biomarkers            Trend Cardiac Enzymes    Trend BNP    Procalcitonin Trend  11-12-21 @ 19:21   -   0.17<H>    WBC Trend  11-17-21 @ 23:29   -  10.89<H>  11-16-21 @ 21:46   -  11.54<H>  11-16-21 @ 06:36   -  13.55<H>  11-16-21 @ 00:19   -  14.67<H>    H/H Trend  11-17-21 @ 23:29   -   7.6<L>/ 23.2<L>  11-16-21 @ 21:46   -   7.5<L>/ 22.9<L>  11-16-21 @ 06:36   -   7.2<L>/ 22.7<L>  11-16-21 @ 00:19   -   6.6<LL>/ 20.1<LL>  11-14-21 @ 21:19   -   7.6<L>/ 23.4<L>  11-13-21 @ 20:55   -   7.7<L>/ 24.3<L>    Stool Occult Blood    Platelet Trend  11-17-21 @ 23:29   -  252  11-16-21 @ 21:46   -  206  11-16-21 @ 06:36   -  193  11-16-21 @ 00:19   -  210    Trend Sodium  11-17-21 @ 23:29   -  138  11-16-21 @ 21:46   -  139  11-16-21 @ 06:36   -  140  11-16-21 @ 00:19   -  141    Trend Potassium  11-17-21 @ 23:29   -  4.3  11-16-21 @ 21:46   -  4.5  11-16-21 @ 06:36   -  4.0  11-16-21 @ 00:19   -  4.6    Trend Bun/Cr  11-17-21 @ 23:29  BUN/CR -  38<H> / 1.37<H>  11-16-21 @ 21:46  BUN/CR -  33<H> / 1.17  11-16-21 @ 06:36  BUN/CR -  40<H> / 1.44<H>  11-16-21 @ 00:19  BUN/CR -  43<H> / 1.48<H>    Lactic Acid Trend    ABG Trend  11-14-21 @ 01:56   - 7.56<H>/31<L>/179<H>/100.0<H>  11-10-21 @ 15:45   - 7.44/38/254<H>/99.5<H>  11-08-21 @ 19:52   - 7.48<H>/35/102/99.2<H>  11-07-21 @ 07:33   - 7.44/35/132<H>/99.7<H>  11-04-21 @ 23:50   - 7.44/34<L>/107/99.1<H>  11-04-21 @ 18:59   - 7.44/33<L>/242<H>/99.3<H>  11-04-21 @ 16:22   - 7.39/41/286<H>/99.9<H>    Trend AST/ALT/ALK Phos/Bili  11-08-21 @ 20:07   23/32/92/0.5  11-04-21 @ 10:16   18/14/94/0.4      Ammonia Trend      Amylase / Lipase Trend      Albumin Trend  11-08-21 @ 20:07   -   3.2<L>  11-04-21 @ 10:16   -   4.8      PTT - PT - INR Trend  11-15-21 @ 11:16   -   30.1 - 12.1 - 1.01  11-09-21 @ 01:19   -   30.4 - 12.1 - 1.01  11-07-21 @ 21:32   -   29.1 - 11.6 - 0.97  11-04-21 @ 10:16   -   27.7 - 11.5 - 0.96    Glucose Trend  11-18-21 @ 17:20   -  -- -- 167<H>  11-18-21 @ 12:05   -  -- -- 221<H>  11-18-21 @ 05:13   -  -- -- 183<H>  11-18-21 @ 00:43   -  -- -- 119<H>  11-17-21 @ 23:29   -  -- 117<H> --  11-17-21 @ 18:51   -  -- -- 168<H>  11-17-21 @ 11:23   -  -- -- 202<H>  11-17-21 @ 05:10   -  -- -- 132<H>  11-17-21 @ 03:50   -  -- -- 119<H>  11-17-21 @ 03:30   -  -- -- 90    A1C with Estimated Average Glucose Result: 6.5 % *H* [4.0 - 5.6] (11-04-21 @ 21:43)        ALLERGIES:   - Reviewed      MEDICATIONS:  MEDICATIONS  (STANDING):  acetylcysteine 20%  Inhalation 3 milliLiter(s) Inhalation three times a day  atorvastatin 40 milliGRAM(s) Oral at bedtime  cefepime   IVPB 2000 milliGRAM(s) IV Intermittent every 8 hours  chlorhexidine 0.12% Liquid 15 milliLiter(s) Oral Mucosa two times a day  chlorhexidine 4% Liquid 1 Application(s) Topical <User Schedule>  dextrose 50% Injectable 25 Gram(s) IV Push once  dextrose 50% Injectable 12.5 Gram(s) IV Push once  dextrose 50% Injectable 25 Gram(s) IV Push once  doxazosin 8 milliGRAM(s) Oral at bedtime  influenza   Vaccine 0.5 milliLiter(s) IntraMuscular once  insulin lispro (ADMELOG) corrective regimen sliding scale   SubCutaneous every 6 hours  insulin NPH human recombinant 13 Unit(s) SubCutaneous every 6 hours  labetalol 100 milliGRAM(s) Oral every 8 hours  pantoprazole  Injectable 40 milliGRAM(s) IV Push daily  senna 2 Tablet(s) Oral at bedtime        PHYSICAL EXAM:    General: ett to vent  CVS: RRR  Pulm: CTAB  GI: Soft, non-distended  Extremities: No LE Edema  Neuro: NO EO to voice or noxious does not attend, dysconjugate gaze, pupils 2mm ?reactive; +L corneal, no R corneal, unable to assess cough/gag due to tube resistance, trace finger flexion b/l uppers, BLE TF L>R

## 2021-11-18 NOTE — PROGRESS NOTE ADULT - SUBJECTIVE AND OBJECTIVE BOX
Patient seen and examined at bedside.    --Anticoagulation--  heparin   Injectable 5000 Unit(s) SubCutaneous every 8 hours    T(C): 37 (11-17-21 @ 21:00), Max: 38.8 (11-17-21 @ 19:00)  HR: 61 (11-17-21 @ 23:00) (60 - 90)  BP: --  RR: 13 (11-17-21 @ 23:00) (9 - 19)  SpO2: 100% (11-17-21 @ 23:00) (98% - 100%)  Wt(kg): --    Exam:    intubated, weak corneals, pupils 3 NR, cough, no FC, flaccid

## 2021-11-19 DIAGNOSIS — Z93.0 TRACHEOSTOMY STATUS: ICD-10-CM

## 2021-11-19 LAB
ANION GAP SERPL CALC-SCNC: 11 MMOL/L — SIGNIFICANT CHANGE UP (ref 5–17)
ANION GAP SERPL CALC-SCNC: 9 MMOL/L — SIGNIFICANT CHANGE UP (ref 5–17)
APTT BLD: 29.3 SEC — SIGNIFICANT CHANGE UP (ref 27.5–35.5)
BUN SERPL-MCNC: 35 MG/DL — HIGH (ref 7–23)
BUN SERPL-MCNC: 40 MG/DL — HIGH (ref 7–23)
CALCIUM SERPL-MCNC: 7.6 MG/DL — LOW (ref 8.4–10.5)
CALCIUM SERPL-MCNC: 8.2 MG/DL — LOW (ref 8.4–10.5)
CHLORIDE SERPL-SCNC: 102 MMOL/L — SIGNIFICANT CHANGE UP (ref 96–108)
CHLORIDE SERPL-SCNC: 104 MMOL/L — SIGNIFICANT CHANGE UP (ref 96–108)
CO2 SERPL-SCNC: 23 MMOL/L — SIGNIFICANT CHANGE UP (ref 22–31)
CO2 SERPL-SCNC: 25 MMOL/L — SIGNIFICANT CHANGE UP (ref 22–31)
CREAT SERPL-MCNC: 1.18 MG/DL — SIGNIFICANT CHANGE UP (ref 0.5–1.3)
CREAT SERPL-MCNC: 1.24 MG/DL — SIGNIFICANT CHANGE UP (ref 0.5–1.3)
CULTURE RESULTS: SIGNIFICANT CHANGE UP
GLUCOSE SERPL-MCNC: 143 MG/DL — HIGH (ref 70–99)
GLUCOSE SERPL-MCNC: 161 MG/DL — HIGH (ref 70–99)
HCT VFR BLD CALC: 22.1 % — LOW (ref 39–50)
HCT VFR BLD CALC: 25.1 % — LOW (ref 39–50)
HCT VFR BLD CALC: 25.1 % — LOW (ref 39–50)
HGB BLD-MCNC: 7 G/DL — CRITICAL LOW (ref 13–17)
HGB BLD-MCNC: 8 G/DL — LOW (ref 13–17)
HGB BLD-MCNC: 8.3 G/DL — LOW (ref 13–17)
INR BLD: 1.05 RATIO — SIGNIFICANT CHANGE UP (ref 0.88–1.16)
MAGNESIUM SERPL-MCNC: 2.1 MG/DL — SIGNIFICANT CHANGE UP (ref 1.6–2.6)
MAGNESIUM SERPL-MCNC: 2.2 MG/DL — SIGNIFICANT CHANGE UP (ref 1.6–2.6)
MCHC RBC-ENTMCNC: 29.5 PG — SIGNIFICANT CHANGE UP (ref 27–34)
MCHC RBC-ENTMCNC: 29.8 PG — SIGNIFICANT CHANGE UP (ref 27–34)
MCHC RBC-ENTMCNC: 30.9 PG — SIGNIFICANT CHANGE UP (ref 27–34)
MCHC RBC-ENTMCNC: 31.7 GM/DL — LOW (ref 32–36)
MCHC RBC-ENTMCNC: 31.9 GM/DL — LOW (ref 32–36)
MCHC RBC-ENTMCNC: 33.1 GM/DL — SIGNIFICANT CHANGE UP (ref 32–36)
MCV RBC AUTO: 92.6 FL — SIGNIFICANT CHANGE UP (ref 80–100)
MCV RBC AUTO: 93.3 FL — SIGNIFICANT CHANGE UP (ref 80–100)
MCV RBC AUTO: 94 FL — SIGNIFICANT CHANGE UP (ref 80–100)
NRBC # BLD: 0 /100 WBCS — SIGNIFICANT CHANGE UP (ref 0–0)
PHOSPHATE SERPL-MCNC: 3.5 MG/DL — SIGNIFICANT CHANGE UP (ref 2.5–4.5)
PHOSPHATE SERPL-MCNC: 3.7 MG/DL — SIGNIFICANT CHANGE UP (ref 2.5–4.5)
PLATELET # BLD AUTO: 277 K/UL — SIGNIFICANT CHANGE UP (ref 150–400)
PLATELET # BLD AUTO: 280 K/UL — SIGNIFICANT CHANGE UP (ref 150–400)
PLATELET # BLD AUTO: 316 K/UL — SIGNIFICANT CHANGE UP (ref 150–400)
POTASSIUM SERPL-MCNC: 4.3 MMOL/L — SIGNIFICANT CHANGE UP (ref 3.5–5.3)
POTASSIUM SERPL-MCNC: 4.7 MMOL/L — SIGNIFICANT CHANGE UP (ref 3.5–5.3)
POTASSIUM SERPL-SCNC: 4.3 MMOL/L — SIGNIFICANT CHANGE UP (ref 3.5–5.3)
POTASSIUM SERPL-SCNC: 4.7 MMOL/L — SIGNIFICANT CHANGE UP (ref 3.5–5.3)
PROTHROM AB SERPL-ACNC: 12.6 SEC — SIGNIFICANT CHANGE UP (ref 10.6–13.6)
RBC # BLD: 2.35 M/UL — LOW (ref 4.2–5.8)
RBC # BLD: 2.69 M/UL — LOW (ref 4.2–5.8)
RBC # BLD: 2.71 M/UL — LOW (ref 4.2–5.8)
RBC # FLD: 13.5 % — SIGNIFICANT CHANGE UP (ref 10.3–14.5)
RBC # FLD: 13.5 % — SIGNIFICANT CHANGE UP (ref 10.3–14.5)
RBC # FLD: 13.7 % — SIGNIFICANT CHANGE UP (ref 10.3–14.5)
SODIUM SERPL-SCNC: 136 MMOL/L — SIGNIFICANT CHANGE UP (ref 135–145)
SODIUM SERPL-SCNC: 138 MMOL/L — SIGNIFICANT CHANGE UP (ref 135–145)
SPECIMEN SOURCE: SIGNIFICANT CHANGE UP
WBC # BLD: 14.3 K/UL — HIGH (ref 3.8–10.5)
WBC # BLD: 9.17 K/UL — SIGNIFICANT CHANGE UP (ref 3.8–10.5)
WBC # BLD: 9.59 K/UL — SIGNIFICANT CHANGE UP (ref 3.8–10.5)
WBC # FLD AUTO: 14.3 K/UL — HIGH (ref 3.8–10.5)
WBC # FLD AUTO: 9.17 K/UL — SIGNIFICANT CHANGE UP (ref 3.8–10.5)
WBC # FLD AUTO: 9.59 K/UL — SIGNIFICANT CHANGE UP (ref 3.8–10.5)

## 2021-11-19 PROCEDURE — 31600 PLANNED TRACHEOSTOMY: CPT

## 2021-11-19 PROCEDURE — 71045 X-RAY EXAM CHEST 1 VIEW: CPT | Mod: 26

## 2021-11-19 PROCEDURE — 99291 CRITICAL CARE FIRST HOUR: CPT

## 2021-11-19 RX ORDER — SODIUM CHLORIDE 9 MG/ML
1000 INJECTION, SOLUTION INTRAVENOUS
Refills: 0 | Status: DISCONTINUED | OUTPATIENT
Start: 2021-11-19 | End: 2021-11-20

## 2021-11-19 RX ORDER — HUMAN INSULIN 100 [IU]/ML
7 INJECTION, SUSPENSION SUBCUTANEOUS ONCE
Refills: 0 | Status: COMPLETED | OUTPATIENT
Start: 2021-11-19 | End: 2021-11-19

## 2021-11-19 RX ORDER — HYDRALAZINE HCL 50 MG
10 TABLET ORAL ONCE
Refills: 0 | Status: COMPLETED | OUTPATIENT
Start: 2021-11-19 | End: 2021-11-19

## 2021-11-19 RX ORDER — MIDAZOLAM HYDROCHLORIDE 1 MG/ML
2 INJECTION, SOLUTION INTRAMUSCULAR; INTRAVENOUS ONCE
Refills: 0 | Status: DISCONTINUED | OUTPATIENT
Start: 2021-11-19 | End: 2021-11-19

## 2021-11-19 RX ORDER — CALCIUM GLUCONATE 100 MG/ML
2 VIAL (ML) INTRAVENOUS ONCE
Refills: 0 | Status: COMPLETED | OUTPATIENT
Start: 2021-11-19 | End: 2021-11-19

## 2021-11-19 RX ORDER — FENTANYL CITRATE 50 UG/ML
100 INJECTION INTRAVENOUS ONCE
Refills: 0 | Status: DISCONTINUED | OUTPATIENT
Start: 2021-11-19 | End: 2021-11-19

## 2021-11-19 RX ADMIN — MODAFINIL 200 MILLIGRAM(S): 200 TABLET ORAL at 11:35

## 2021-11-19 RX ADMIN — CHLORHEXIDINE GLUCONATE 1 APPLICATION(S): 213 SOLUTION TOPICAL at 21:47

## 2021-11-19 RX ADMIN — SODIUM CHLORIDE 75 MILLILITER(S): 9 INJECTION, SOLUTION INTRAVENOUS at 13:50

## 2021-11-19 RX ADMIN — CEFEPIME 100 MILLIGRAM(S): 1 INJECTION, POWDER, FOR SOLUTION INTRAMUSCULAR; INTRAVENOUS at 05:17

## 2021-11-19 RX ADMIN — SENNA PLUS 2 TABLET(S): 8.6 TABLET ORAL at 21:46

## 2021-11-19 RX ADMIN — CEFEPIME 100 MILLIGRAM(S): 1 INJECTION, POWDER, FOR SOLUTION INTRAMUSCULAR; INTRAVENOUS at 14:46

## 2021-11-19 RX ADMIN — Medication 10 MILLIGRAM(S): at 08:20

## 2021-11-19 RX ADMIN — HUMAN INSULIN 13 UNIT(S): 100 INJECTION, SUSPENSION SUBCUTANEOUS at 00:59

## 2021-11-19 RX ADMIN — HUMAN INSULIN 7 UNIT(S): 100 INJECTION, SUSPENSION SUBCUTANEOUS at 23:30

## 2021-11-19 RX ADMIN — FENTANYL CITRATE 100 MICROGRAM(S): 50 INJECTION INTRAVENOUS at 13:30

## 2021-11-19 RX ADMIN — CEFEPIME 100 MILLIGRAM(S): 1 INJECTION, POWDER, FOR SOLUTION INTRAMUSCULAR; INTRAVENOUS at 21:46

## 2021-11-19 RX ADMIN — CHLORHEXIDINE GLUCONATE 15 MILLILITER(S): 213 SOLUTION TOPICAL at 05:18

## 2021-11-19 RX ADMIN — Medication 100 MILLIGRAM(S): at 18:16

## 2021-11-19 RX ADMIN — MIDAZOLAM HYDROCHLORIDE 2 MILLIGRAM(S): 1 INJECTION, SOLUTION INTRAMUSCULAR; INTRAVENOUS at 13:15

## 2021-11-19 RX ADMIN — POLYETHYLENE GLYCOL 3350 17 GRAM(S): 17 POWDER, FOR SOLUTION ORAL at 11:35

## 2021-11-19 RX ADMIN — Medication 4: at 00:59

## 2021-11-19 RX ADMIN — Medication 10 MILLIGRAM(S): at 13:50

## 2021-11-19 RX ADMIN — ATORVASTATIN CALCIUM 40 MILLIGRAM(S): 80 TABLET, FILM COATED ORAL at 21:46

## 2021-11-19 RX ADMIN — Medication 4: at 23:31

## 2021-11-19 RX ADMIN — CHLORHEXIDINE GLUCONATE 15 MILLILITER(S): 213 SOLUTION TOPICAL at 18:16

## 2021-11-19 RX ADMIN — Medication 200 GRAM(S): at 05:17

## 2021-11-19 RX ADMIN — PANTOPRAZOLE SODIUM 40 MILLIGRAM(S): 20 TABLET, DELAYED RELEASE ORAL at 11:35

## 2021-11-19 RX ADMIN — Medication 8 MILLIGRAM(S): at 21:46

## 2021-11-19 RX ADMIN — Medication 100 MILLIGRAM(S): at 05:18

## 2021-11-19 NOTE — PROGRESS NOTE ADULT - PROBLEM SELECTOR PLAN 1
- continue vent   - gentle suction prn   - continue with trach site care, keep dry and clean   - call ENT with any issues

## 2021-11-19 NOTE — PROGRESS NOTE ADULT - ATTENDING COMMENTS
Patient seen and examined by attending on 11/19/2021.    Elyssa Hernandez  Neurocritical Care Attending

## 2021-11-19 NOTE — PROGRESS NOTE ADULT - ASSESSMENT
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 EVD clamped 11/13      NEURO:  ICH score: 4   neuro checks q4  - d/c Dex  - pain control  - ongoing GOC with family  - waxing and waning exam will obtain EEG;  negative read negative will d/c   - start modafinil 200mg in AM     PULM:  Intubated for airway protection  - CPAP trials - abg   - vent bundle  - chest PT/ Mucomyst   - trach plan for Friday - ent following   -RCU c/s     CV:  Afib RVR on admission  - SBP goal 100-160  - cardene PRN to goal  - labetalol 100mg q8h will  -keep rajani until after procedure friday    RENAL:  Baseline Cr 1.24, currently 1.4  Na goal >145  Goal euvolemia to net +  - trend lytes  - daily IOs  -IVL    GI:   Diet: TF @ 80  - GI prophylaxis: PPI while intubated  - Bowel regimen - senna   -Peg friday   -NPO after midnight     ENDO:   A1c 6.4%  - FS goal 120-180  - HISS  -hypoglyemia  decrease NPH  13Uq6     HEME/ONC:  LED negative 11/8  - SCDs  - Chemoppx: heparin SQ    ID:  cefepime end date 11/21 (7 days) for enterobacter PNA    CODE STATUS:  [x] Full Code [] DNR [] DNI [] Palliative/Comfort Care    DISPOSITION:  [x] ICU [] Stroke Unit [] Floor [] EMU [] RCU [] PCU   ASSESSMENT: 62 yo man hx of HTN, DM, admitted 11/4 with HA/N/V followed by unresponsiveness, CT head with posterior fossa hemorrhage c/b severe IVH with casting of the 4th and 3rd ventricles with hydrocephalus, s/p EVD and SOC, 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 EVD clamped 11/13      NEURO:  ICH score: 4   neuro checks q4  - pain control  - modafinil 200mg in AM     PULM:  Intubated for airway protection  - CPAP trials - abg   - vent bundle  - chest PT/ Mucomyst   - trach plan for today - ent following   -RCU c/s     CV:  Afib RVR on admission  - SBP goal 100-160  - cardene PRN to goal  - labetalol 100mg q8h will  -keep rajani until after procedure    RENAL:  Baseline Cr 1.24, currently 1.4  Na goal >145  start IVF plasmalyte 75cc/hr while NPO   - trend lytes  - daily IOs  -IVL    GI:   Diet: TF @ 80  - GI prophylaxis: PPI while intubated  - Bowel regimen - senna add Doculax suppository   -Peg today      ENDO:   A1c 6.4%  - FS goal 120-180  - HISS  -hypoglyemia hold insulin as pt NPO    HEME/ONC:  LED negative 11/8  - SCDs  - Chemoppx: heparin SQ    ID:  cefepime end date 11/21 (7 days) for enterobacter PNA    CODE STATUS:  [x] Full Code [] DNR [] DNI [] Palliative/Comfort Care    DISPOSITION:  [x] ICU [] Stroke Unit [] Floor [] EMU [] RCU [] PCU

## 2021-11-19 NOTE — PRE-ANESTHESIA EVALUATION ADULT - NSRADCARDRESULTSFT_GEN_ALL_CORE
< from: Transthoracic Echocardiogram (11.09.21 @ 11:26) >    Dimensions:    Normal Values:  LA:     3.6    2.0 - 4.0 cm  Ao:     3.1    2.0 - 3.8 cm  SEPTUM: 1.0    0.6 - 1.2 cm  PWT:    1.1    0.6 - 1.1 cm  LVIDd:  5.0    3.0 - 5.6 cm  LVIDs:  3.2    1.8 - 4.0 cm  Derived variables:  LVMI: 95 g/m2  RWT: 0.44  Fractional short: 36 %  EF (Clark Rule): 65 %  ------------------------------------------------------------------------  Observations:  Mitral Valve: Mitral annular calcification, otherwise  normal mitral valve.  Aortic Valve/Aorta: Calcified trileaflet aortic valve with  normal opening.  Aortic Root: 3.1 cm.  Left Atrium: Mildly dilated left atrium.  LA volume index =  40 cc/m2.  Left Ventricle: Normal left ventricular systolic function.  Normal left ventricular internal dimensions and wall  thicknesses. Normal diastolic function  Right Heart: Normal right atrium. The right ventricle is  not well visualized; grossly normal right ventricular  systolic function. Normal tricuspid valve. Normal pulmonic  valve.  Pericardium/Pleura: Normal pericardium with no pericardial  effusion.  Right pleural effusion.  Hemodynamic: Estimated right atrial pressure is 8 mm Hg.  ------------------------------------------------------------------------  Conclusions:  1. Normal left ventricular systolic function.  2. The right ventricle is not well visualized; grossly  normal right ventricular systolic function.  *** No previous Echo exam.    < end of copied text >

## 2021-11-19 NOTE — PROGRESS NOTE ADULT - SUBJECTIVE AND OBJECTIVE BOX
24 hr EVENTS: no acute events, EEG negative       EXAMINATION:  General:  calm  HEENT:  MMM  Neuro:  MELVI to nox, non reactive, +cough/gag ,+overbreathes, no corneals, does not follow commands, localizing left upper   Cards:  RRR  Respiratory:  no respiratory distress  Abdomen:  soft  Extremities:  no edema            ICU Vital Signs Last 24 Hrs  T(C): 37.2 (18 Nov 2021 23:00), Max: 37.7 (18 Nov 2021 11:00)  T(F): 99 (18 Nov 2021 23:00), Max: 99.9 (18 Nov 2021 11:00)  HR: 61 (19 Nov 2021 03:09) (61 - 80)  BP: --  BP(mean): --  ABP: 118/56 (19 Nov 2021 01:00) (107/49 - 179/70)  ABP(mean): 75 (19 Nov 2021 01:00) (65 - 100)  RR: 16 (19 Nov 2021 01:00) (14 - 19)  SpO2: 100% (19 Nov 2021 03:09) (99% - 100%)      11-17-21 @ 07:01  -  11-18-21 @ 07:00  --------------------------------------------------------  IN: 2199.3 mL / OUT: 4200 mL / NET: -2000.7 mL    11-18-21 @ 07:01  -  11-19-21 @ 06:24  --------------------------------------------------------  IN: 1800 mL / OUT: 1970 mL / NET: -170 mL        Mode: AC/ CMV (Assist Control/ Continuous Mandatory Ventilation), RR (machine): 16, TV (machine): 450, FiO2: 30, PEEP: 5, ITime: 1, MAP: 15, PIP: 27    acetaminophen    Suspension .. 650 milliGRAM(s) Oral every 6 hours PRN  atorvastatin 40 milliGRAM(s) Oral at bedtime  cefepime   IVPB 2000 milliGRAM(s) IV Intermittent every 8 hours  chlorhexidine 0.12% Liquid 15 milliLiter(s) Oral Mucosa two times a day  chlorhexidine 4% Liquid 1 Application(s) Topical <User Schedule>  dextrose 50% Injectable 25 Gram(s) IV Push once  dextrose 50% Injectable 12.5 Gram(s) IV Push once  dextrose 50% Injectable 25 Gram(s) IV Push once  doxazosin 8 milliGRAM(s) Oral at bedtime  fentaNYL    Injectable 25 MICROGram(s) IV Push every 4 hours PRN  influenza   Vaccine 0.5 milliLiter(s) IntraMuscular once  insulin lispro (ADMELOG) corrective regimen sliding scale   SubCutaneous every 6 hours  insulin NPH human recombinant 13 Unit(s) SubCutaneous every 6 hours  labetalol 100 milliGRAM(s) Oral every 8 hours  modafinil 200 milliGRAM(s) Oral daily  pantoprazole  Injectable 40 milliGRAM(s) IV Push daily  polyethylene glycol 3350 17 Gram(s) Oral daily  senna 2 Tablet(s) Oral at bedtime      LABS:  Na: 138 (11-19 @ 00:04), 138 (11-17 @ 23:29), 139 (11-16 @ 21:46), 140 (11-16 @ 06:36)  K: 4.3 (11-19 @ 00:04), 4.3 (11-17 @ 23:29), 4.5 (11-16 @ 21:46), 4.0 (11-16 @ 06:36)  Cl: 104 (11-19 @ 00:04), 101 (11-17 @ 23:29), 105 (11-16 @ 21:46), 105 (11-16 @ 06:36)  CO2: 25 (11-19 @ 00:04), 24 (11-17 @ 23:29), 26 (11-16 @ 21:46), 26 (11-16 @ 06:36)  BUN: 40 (11-19 @ 00:04), 38 (11-17 @ 23:29), 33 (11-16 @ 21:46), 40 (11-16 @ 06:36)  Cr: 1.24 (11-19 @ 00:04), 1.37 (11-17 @ 23:29), 1.17 (11-16 @ 21:46), 1.44 (11-16 @ 06:36)  Glu: 143(11-19 @ 00:04), 117(11-17 @ 23:29), 86(11-16 @ 21:46), 121(11-16 @ 06:36)    Hgb: 7.0 (11-19 @ 00:04), 7.6 (11-17 @ 23:29), 7.5 (11-16 @ 21:46), 7.2 (11-16 @ 06:36)  Hct: 22.1 (11-19 @ 00:04), 23.2 (11-17 @ 23:29), 22.9 (11-16 @ 21:46), 22.7 (11-16 @ 06:36)  WBC: 9.59 (11-19 @ 00:04), 10.89 (11-17 @ 23:29), 11.54 (11-16 @ 21:46), 13.55 (11-16 @ 06:36)  Plt: 277 (11-19 @ 00:04), 252 (11-17 @ 23:29), 206 (11-16 @ 21:46), 193 (11-16 @ 06:36)    INR: 1.05 11-19-21 @ 00:04  PTT: 29.3 11-19-21 @ 00:04                         24 hr EVENTS: fsg on the low side       EXAMINATION:  General:  calm  HEENT:  MMM  Neuro:  MELVI to nox, non reactive, +cough/gag ,+overbreathes, no corneals, does not follow commands, localizing left upper   Cards:  RRR  Respiratory:  no respiratory distress  Abdomen:  soft  Extremities:  no edems        ICU Vital Signs Last 24 Hrs  T(C): 37.2 (18 Nov 2021 23:00), Max: 37.7 (18 Nov 2021 11:00)  T(F): 99 (18 Nov 2021 23:00), Max: 99.9 (18 Nov 2021 11:00)  HR: 61 (19 Nov 2021 03:09) (61 - 80)  ABP: 118/56 (19 Nov 2021 01:00) (107/49 - 179/70)  ABP(mean): 75 (19 Nov 2021 01:00) (65 - 100)  RR: 16 (19 Nov 2021 01:00) (14 - 19)  SpO2: 100% (19 Nov 2021 03:09) (99% - 100%)      11-17-21 @ 07:01  -  11-18-21 @ 07:00  --------------------------------------------------------  IN: 2199.3 mL / OUT: 4200 mL / NET: -2000.7 mL    11-18-21 @ 07:01  -  11-19-21 @ 06:24  --------------------------------------------------------  IN: 1800 mL / OUT: 1970 mL / NET: -170 mL        Mode: AC/ CMV (Assist Control/ Continuous Mandatory Ventilation), RR (machine): 16, TV (machine): 450, FiO2: 30, PEEP: 5, ITime: 1, MAP: 15, PIP: 27    acetaminophen    Suspension .. 650 milliGRAM(s) Oral every 6 hours PRN  atorvastatin 40 milliGRAM(s) Oral at bedtime  cefepime   IVPB 2000 milliGRAM(s) IV Intermittent every 8 hours  chlorhexidine 0.12% Liquid 15 milliLiter(s) Oral Mucosa two times a day  chlorhexidine 4% Liquid 1 Application(s) Topical <User Schedule>  dextrose 50% Injectable 25 Gram(s) IV Push once  dextrose 50% Injectable 12.5 Gram(s) IV Push once  dextrose 50% Injectable 25 Gram(s) IV Push once  doxazosin 8 milliGRAM(s) Oral at bedtime  fentaNYL    Injectable 25 MICROGram(s) IV Push every 4 hours PRN  influenza   Vaccine 0.5 milliLiter(s) IntraMuscular once  insulin lispro (ADMELOG) corrective regimen sliding scale   SubCutaneous every 6 hours  insulin NPH human recombinant 13 Unit(s) SubCutaneous every 6 hours  labetalol 100 milliGRAM(s) Oral every 8 hours  modafinil 200 milliGRAM(s) Oral daily  pantoprazole  Injectable 40 milliGRAM(s) IV Push daily  polyethylene glycol 3350 17 Gram(s) Oral daily  senna 2 Tablet(s) Oral at bedtime      LABS:  Na: 138 (11-19 @ 00:04), 138 (11-17 @ 23:29), 139 (11-16 @ 21:46), 140 (11-16 @ 06:36)  K: 4.3 (11-19 @ 00:04), 4.3 (11-17 @ 23:29), 4.5 (11-16 @ 21:46), 4.0 (11-16 @ 06:36)  Cl: 104 (11-19 @ 00:04), 101 (11-17 @ 23:29), 105 (11-16 @ 21:46), 105 (11-16 @ 06:36)  CO2: 25 (11-19 @ 00:04), 24 (11-17 @ 23:29), 26 (11-16 @ 21:46), 26 (11-16 @ 06:36)  BUN: 40 (11-19 @ 00:04), 38 (11-17 @ 23:29), 33 (11-16 @ 21:46), 40 (11-16 @ 06:36)  Cr: 1.24 (11-19 @ 00:04), 1.37 (11-17 @ 23:29), 1.17 (11-16 @ 21:46), 1.44 (11-16 @ 06:36)  Glu: 143(11-19 @ 00:04), 117(11-17 @ 23:29), 86(11-16 @ 21:46), 121(11-16 @ 06:36)    Hgb: 7.0 (11-19 @ 00:04), 7.6 (11-17 @ 23:29), 7.5 (11-16 @ 21:46), 7.2 (11-16 @ 06:36)  Hct: 22.1 (11-19 @ 00:04), 23.2 (11-17 @ 23:29), 22.9 (11-16 @ 21:46), 22.7 (11-16 @ 06:36)  WBC: 9.59 (11-19 @ 00:04), 10.89 (11-17 @ 23:29), 11.54 (11-16 @ 21:46), 13.55 (11-16 @ 06:36)  Plt: 277 (11-19 @ 00:04), 252 (11-17 @ 23:29), 206 (11-16 @ 21:46), 193 (11-16 @ 06:36)    INR: 1.05 11-19-21 @ 00:04  PTT: 29.3 11-19-21 @ 00:04

## 2021-11-19 NOTE — PROGRESS NOTE ADULT - ASSESSMENT
61 year old man with respiratory failure d/t ICH    1. ICH  -per neurosurgery, s/p EVD    2. Respiratory failure  -for tracheostomy today, will require long term enteral nutrition  -PEG today    3. Enterobacter PNA  -on cefepime    4. afib with RVR    5. Anemia, no overt GI bleed  -trend CBC    The plan of care was discussed with the physician assistant and modifications were made to the notation where appropriate.   Differential diagnosis and plan of care discussed with patient after the evaluation  35 minutes spent on total encounter of which more than fifty percent of the encounter was spent counseling and/or coordinating care by the attending physician.    [facility]  Gastroenterology and Hepatology  [address] [place], [place]  Office: [phone]  Cell: [phone]

## 2021-11-19 NOTE — PRE-ANESTHESIA EVALUATION ADULT - NSANTHPEFT_GEN_ALL_CORE
General: Intubated  Heart: RRR
General: In bed, minimally responsive, intubated  CV: Regular  Pulm: Intubated, on ventilator

## 2021-11-19 NOTE — EEG REPORT - NS EEG TEXT BOX
[facility]  Comprehensive Epilepsy Center  Report of Continuous Video EEG    [facility]: [address], [place] [address], Phone [phone]  [facility]: [address] [place], [place] [address], [place] [address], Phone [phone]  [place] [facility]: [address] [facility], [address], [place] [address] Phone [phone]    [facility]: [address] [place] [address], Phone [phone]  [place] [facility]: [address] [place] [address], Phone [phone]    Patient Name: Chon Camacho    Age: 61 year, : 1960  Patient ID: -, MRN #: [id number], King: NSCU Bed 13 NSCU Bed 13    Study Time/Date: 0800 2021  	  End Time/Date: 2021          			   Duration: 9:11hrs    Study Information:    EEG Recording Technique:  The patient underwent continuous Video-EEG monitoring, using Telemetry System hardware on the XLTek Digital System. EEG and video data were stored on a computer hard drive with important events saved in digital archive files. The material was reviewed by a physician (electroencephalographer / epileptologist) on a daily basis. Bhavesh and seizure detection algorithms were utilized and reviewed. An EEG Technician attended to the patient, and was available throughout daytime work hours.  The epilepsy center neurologist was available in person or on call 24-hours per day.    EEG Placement and Labeling of Electrodes:  The EEG was performed utilizing 20 channel referential EEG connections (coronal over temporal over parasagittal montage) using all standard 10-20 electrode placements, with additional electrodes placed in the inferior temporal region using the modified 10-10 montage electrode placements for elective admissions, or if deemed necessary. Recording was at a sampling rate of 256 samples per second per channel. Time synchronized digital video recording was done simultaneously with EEG recording. A low light infrared camera was used for low light recording.     History:   VEEG Performed Bedside  COR:Vented  No HV Due to COVID Protocol  No Photic due to Patient Condition  60 y/o Male PMH Functional Quadriplegia, HTN, Diabetes Mellitus, IVH  p/w AMS        Pertinent Medication  Protonix  Lipitor  Tylenol  Fentanyl  Precedex  Decadron    Interpretation:    Daily EEG Visual Analysis  Findings: The background was continuous and reactive.   No posterior dominant rhythm seen.  Background predominantly consisted of theta, delta and faster activities.    Focal Slowing:   None were present.    Sleep Background:  Drowsiness and stage II sleep transients were not recorded.    Other Non-Epileptiform Findings:  None were present.    Interictal Epileptiform Activity:   None were present.    Events:  Clinical events: None recorded.  Seizures: None recorded.    Activation Procedures:   Hyperventilation was not performed.    Photic stimulation was not performed.     Artifacts:  Intermittent myogenic and movement artifacts were noted.    EEG Summary / Classification:  Abnormal EEG   - Moderate generalized slowing.    EEG Impression / Clinical Correlate:  Abnormal EEG study.  Moderate nonspecific diffuse or multifocal cerebral dysfunction.   No epileptiform pattern or seizure seen.  ________________________________________    Jeromy Raymond MD FALEYLA  Director, Continuous EEG Monitoring Program, [facility]   and Epilepsy Fellowship ,   Department of Neurology, [employer] [facility] [employer]    [facility] EEG Reading Room Ph#: [phone]  Epilepsy Answering Service after 5PM and before 8:30AM: Ph#: [phone]   [facility]  Comprehensive Epilepsy Center  Report of Continuous Video EEG    [facility]: [address], [place] [address], Phone [phone]  [facility]: [address] [place], [place] [address] [address], Phone [phone]  [place] Office: [address] [facility], [address], [place] [address] Phone [phone]    [facility]: [address] [place] [address], Phone [phone]  [place] Office: [address] [place] [address], Phone [phone]    Patient Name: Chon Camacho    Age: 61 year, : 1960  Patient ID: -, MRN #: [id number], King: NSCU Bed 13 NSCU Bed 13    Study Time/Date: 0800 2021  	  End Time/Date: 2021          			   Duration: 9:11hrs    Study Information:    EEG Recording Technique:  The patient underwent continuous Video-EEG monitoring, using Telemetry System hardware on the XLTek Digital System. EEG and video data were stored on a computer hard drive with important events saved in digital archive files. The material was reviewed by a physician (electroencephalographer / epileptologist) on a daily basis. Bhavesh and seizure detection algorithms were utilized and reviewed. An EEG Technician attended to the patient, and was available throughout daytime work hours.  The epilepsy center neurologist was available in person or on call 24-hours per day.    EEG Placement and Labeling of Electrodes:  The EEG was performed utilizing 20 channel referential EEG connections (coronal over temporal over parasagittal montage) using all standard 10-20 electrode placements, with additional electrodes placed in the inferior temporal region using the modified 10-10 montage electrode placements for elective admissions, or if deemed necessary. Recording was at a sampling rate of 256 samples per second per channel. Time synchronized digital video recording was done simultaneously with EEG recording. A low light infrared camera was used for low light recording.     History:   VEEG Performed Bedside  COR:Vented  No HV Due to COVID Protocol  No Photic due to Patient Condition  62 y/o Male PMH Functional Quadriplegia, HTN, Diabetes Mellitus, IVH  p/w AMS        Pertinent Medication  Protonix  Lipitor  Tylenol  Fentanyl  Precedex  Decadron    Interpretation:    Daily EEG Visual Analysis  Findings: The background was continuous and reactive.   No posterior dominant rhythm seen.  Background predominantly consisted of theta, delta and faster activities.  1hz GRDA with arousal    Focal Slowing:   None were present.    Sleep Background:  Drowsiness and stage II sleep transients were not recorded.    Other Non-Epileptiform Findings:  None were present.    Interictal Epileptiform Activity:   None were present.    Events:  Clinical events: None recorded.  Seizures: None recorded.    Activation Procedures:   Hyperventilation was not performed.    Photic stimulation was not performed.     Artifacts:  Intermittent myogenic and movement artifacts were noted.    EEG Summary / Classification:  Abnormal EEG   - Moderate to severe generalized slowing, GRDA.    EEG Impression / Clinical Correlate:  Abnormal EEG study.  Moderate to severe nonspecific diffuse or multifocal cerebral dysfunction.   No epileptiform pattern or seizure seen.  ________________________________________    MD SAUL CheryES  Director, Continuous EEG Monitoring Program, [facility]   and Epilepsy Fellowship ,   Department of Neurology, [facility] [employer]    [facility] EEG Reading Room Ph#: [phone]  Epilepsy Answering Service after 5PM and before 8:30AM: Ph#: [phone]

## 2021-11-19 NOTE — CHART NOTE - NSCHARTNOTEFT_GEN_A_CORE
plan for trach today as per primary team.  Palliative care will sign off.  Please reconsult if necessary.

## 2021-11-19 NOTE — PROGRESS NOTE ADULT - ASSESSMENT
62 y/o male POD #0 for tracheostomy 2/2 respiratory failure. Pt is doing well, #8 shiley cuffed with surgicel in place, on the vent, with minimal serosanguinous oozing from stoma, no active bleed. sutures x4 and umbilical tie in place.

## 2021-11-19 NOTE — PROGRESS NOTE ADULT - SUBJECTIVE AND OBJECTIVE BOX
NEUROCRITICAL CARE EVENING NOTE    DAY EVENTS:    - s/p trach/peg  - d5/7 cefepime for tracheobronchitis      VITALS/IMAGING/DATA    T(C): 37.5 (11-19-21 @ 20:00), Max: 37.5 (11-19-21 @ 15:00)  T(F): 99.5 (11-19-21 @ 20:00), Max: 99.5 (11-19-21 @ 15:00)  HR: 76 (11-19-21 @ 22:00) (58 - 105)  BP: 151/63 (11-19-21 @ 16:02) (151/63 - 151/63)  ABP: 137/62 (11-19-21 @ 22:00) (115/93 - 179/70)  ABP(mean): 82 (11-19-21 @ 22:00) (75 - 105)  RR: 18 (11-19-21 @ 22:00) (12 - 24)  SpO2: 100% (11-19-21 @ 22:00) (99% - 100%)      COVID  11-18-21 @ 17:16  COVID -   NotDetec  11-09-21 @ 01:21  COVID -   NotDetec  11-04-21 @ 10:27  COVID -   NotDetec      COVID Biomarkers            Trend Cardiac Enzymes    Trend BNP    Procalcitonin Trend  11-12-21 @ 19:21   -   0.17<H>    WBC Trend  11-19-21 @ 07:01   -  9.17  11-19-21 @ 00:04   -  9.59  11-17-21 @ 23:29   -  10.89<H>    H/H Trend  11-19-21 @ 07:01   -   8.0<L>/ 25.1<L>  11-19-21 @ 00:04   -   7.0<LL>/ 22.1<L>  11-17-21 @ 23:29   -   7.6<L>/ 23.2<L>  11-16-21 @ 21:46   -   7.5<L>/ 22.9<L>  11-16-21 @ 06:36   -   7.2<L>/ 22.7<L>  11-16-21 @ 00:19   -   6.6<LL>/ 20.1<LL>    Stool Occult Blood    Platelet Trend  11-19-21 @ 07:01   -  280  11-19-21 @ 00:04   -  277  11-17-21 @ 23:29   -  252    Trend Sodium  11-19-21 @ 00:04   -  138  11-17-21 @ 23:29   -  138    Trend Potassium  11-19-21 @ 00:04   -  4.3  11-17-21 @ 23:29   -  4.3    Trend Bun/Cr  11-19-21 @ 00:04  BUN/CR -  40<H> / 1.24  11-17-21 @ 23:29  BUN/CR -  38<H> / 1.37<H>    Lactic Acid Trend    ABG Trend  11-14-21 @ 01:56   - 7.56<H>/31<L>/179<H>/100.0<H>  11-10-21 @ 15:45   - 7.44/38/254<H>/99.5<H>  11-08-21 @ 19:52   - 7.48<H>/35/102/99.2<H>  11-07-21 @ 07:33   - 7.44/35/132<H>/99.7<H>  11-04-21 @ 23:50   - 7.44/34<L>/107/99.1<H>  11-04-21 @ 18:59   - 7.44/33<L>/242<H>/99.3<H>  11-04-21 @ 16:22   - 7.39/41/286<H>/99.9<H>    Trend AST/ALT/ALK Phos/Bili  11-08-21 @ 20:07   23/32/92/0.5  11-04-21 @ 10:16   18/14/94/0.4      Ammonia Trend      Amylase / Lipase Trend      Albumin Trend  11-08-21 @ 20:07   -   3.2<L>  11-04-21 @ 10:16   -   4.8      PTT - PT - INR Trend  11-19-21 @ 00:04   -   29.3 - 12.6 - 1.05  11-15-21 @ 11:16   -   30.1 - 12.1 - 1.01  11-09-21 @ 01:19   -   30.4 - 12.1 - 1.01  11-07-21 @ 21:32   -   29.1 - 11.6 - 0.97  11-04-21 @ 10:16   -   27.7 - 11.5 - 0.96    Glucose Trend  11-19-21 @ 17:17   -  -- -- 140<H>  11-19-21 @ 11:33   -  -- -- 128<H>  11-19-21 @ 07:59   -  -- -- 84  11-19-21 @ 06:43   -  -- -- 78  11-19-21 @ 04:58   -  -- -- 86  11-19-21 @ 00:53   -  -- -- 154<H>  11-19-21 @ 00:04   -  -- 143<H> --  11-18-21 @ 17:20   -  -- -- 167<H>  11-18-21 @ 12:05   -  -- -- 221<H>  11-18-21 @ 05:13   -  -- -- 183<H>    A1C with Estimated Average Glucose Result: 6.5 % *H* [4.0 - 5.6] (11-04-21 @ 21:43)        ALLERGIES:   - Reviewed      MEDICATIONS:  MEDICATIONS  (STANDING):  atorvastatin 40 milliGRAM(s) Oral at bedtime  cefepime   IVPB 2000 milliGRAM(s) IV Intermittent every 8 hours  chlorhexidine 0.12% Liquid 15 milliLiter(s) Oral Mucosa two times a day  chlorhexidine 4% Liquid 1 Application(s) Topical <User Schedule>  dextrose 50% Injectable 25 Gram(s) IV Push once  dextrose 50% Injectable 12.5 Gram(s) IV Push once  dextrose 50% Injectable 25 Gram(s) IV Push once  doxazosin 8 milliGRAM(s) Oral at bedtime  influenza   Vaccine 0.5 milliLiter(s) IntraMuscular once  insulin lispro (ADMELOG) corrective regimen sliding scale   SubCutaneous every 6 hours  insulin NPH human recombinant 13 Unit(s) SubCutaneous every 6 hours  labetalol 100 milliGRAM(s) Oral every 8 hours  modafinil 200 milliGRAM(s) Oral daily  multiple electrolytes Injection Type 1 1000 milliLiter(s) (75 mL/Hr) IV Continuous <Continuous>  pantoprazole  Injectable 40 milliGRAM(s) IV Push daily  polyethylene glycol 3350 17 Gram(s) Oral daily  senna 2 Tablet(s) Oral at bedtime        PHYSICAL EXAM:    General: calm  CVS: RRR  Pulm: CTAB  GI: Soft, NTND  Extremities: No LE Edema  Neuro: MELVI to nox, head movement side to side spont, +corneals, +overbreathing, 2mmpupils, RUE 1/5, LUE trace spont movement, BLE TF             NEUROCRITICAL CARE EVENING NOTE    DAY EVENTS:    - s/p trach/peg  - d5/7 cefepime for tracheobronchitis  Patient seen on evening rounds, no acute events.      VITALS/IMAGING/DATA    T(C): 37.5 (11-19-21 @ 20:00), Max: 37.5 (11-19-21 @ 15:00)  T(F): 99.5 (11-19-21 @ 20:00), Max: 99.5 (11-19-21 @ 15:00)  HR: 76 (11-19-21 @ 22:00) (58 - 105)  BP: 151/63 (11-19-21 @ 16:02) (151/63 - 151/63)  ABP: 137/62 (11-19-21 @ 22:00) (115/93 - 179/70)  ABP(mean): 82 (11-19-21 @ 22:00) (75 - 105)  RR: 18 (11-19-21 @ 22:00) (12 - 24)  SpO2: 100% (11-19-21 @ 22:00) (99% - 100%)      COVID  11-18-21 @ 17:16  COVID -   NotDetec  11-09-21 @ 01:21  COVID -   NotDetec  11-04-21 @ 10:27  COVID -   NotDetec      COVID Biomarkers            Trend Cardiac Enzymes    Trend BNP    Procalcitonin Trend  11-12-21 @ 19:21   -   0.17<H>    WBC Trend  11-19-21 @ 07:01   -  9.17  11-19-21 @ 00:04   -  9.59  11-17-21 @ 23:29   -  10.89<H>    H/H Trend  11-19-21 @ 07:01   -   8.0<L>/ 25.1<L>  11-19-21 @ 00:04   -   7.0<LL>/ 22.1<L>  11-17-21 @ 23:29   -   7.6<L>/ 23.2<L>  11-16-21 @ 21:46   -   7.5<L>/ 22.9<L>  11-16-21 @ 06:36   -   7.2<L>/ 22.7<L>  11-16-21 @ 00:19   -   6.6<LL>/ 20.1<LL>    Stool Occult Blood    Platelet Trend  11-19-21 @ 07:01   -  280  11-19-21 @ 00:04   -  277  11-17-21 @ 23:29   -  252    Trend Sodium  11-19-21 @ 00:04   -  138  11-17-21 @ 23:29   -  138    Trend Potassium  11-19-21 @ 00:04   -  4.3  11-17-21 @ 23:29   -  4.3    Trend Bun/Cr  11-19-21 @ 00:04  BUN/CR -  40<H> / 1.24  11-17-21 @ 23:29  BUN/CR -  38<H> / 1.37<H>    Lactic Acid Trend    ABG Trend  11-14-21 @ 01:56   - 7.56<H>/31<L>/179<H>/100.0<H>  11-10-21 @ 15:45   - 7.44/38/254<H>/99.5<H>  11-08-21 @ 19:52   - 7.48<H>/35/102/99.2<H>  11-07-21 @ 07:33   - 7.44/35/132<H>/99.7<H>  11-04-21 @ 23:50   - 7.44/34<L>/107/99.1<H>  11-04-21 @ 18:59   - 7.44/33<L>/242<H>/99.3<H>  11-04-21 @ 16:22   - 7.39/41/286<H>/99.9<H>    Trend AST/ALT/ALK Phos/Bili  11-08-21 @ 20:07   23/32/92/0.5  11-04-21 @ 10:16   18/14/94/0.4      Ammonia Trend      Amylase / Lipase Trend      Albumin Trend  11-08-21 @ 20:07   -   3.2<L>  11-04-21 @ 10:16   -   4.8      PTT - PT - INR Trend  11-19-21 @ 00:04   -   29.3 - 12.6 - 1.05  11-15-21 @ 11:16   -   30.1 - 12.1 - 1.01  11-09-21 @ 01:19   -   30.4 - 12.1 - 1.01  11-07-21 @ 21:32   -   29.1 - 11.6 - 0.97  11-04-21 @ 10:16   -   27.7 - 11.5 - 0.96    Glucose Trend  11-19-21 @ 17:17   -  -- -- 140<H>  11-19-21 @ 11:33   -  -- -- 128<H>  11-19-21 @ 07:59   -  -- -- 84  11-19-21 @ 06:43   -  -- -- 78  11-19-21 @ 04:58   -  -- -- 86  11-19-21 @ 00:53   -  -- -- 154<H>  11-19-21 @ 00:04   -  -- 143<H> --  11-18-21 @ 17:20   -  -- -- 167<H>  11-18-21 @ 12:05   -  -- -- 221<H>  11-18-21 @ 05:13   -  -- -- 183<H>    A1C with Estimated Average Glucose Result: 6.5 % *H* [4.0 - 5.6] (11-04-21 @ 21:43)        ALLERGIES:   - Reviewed      MEDICATIONS:  MEDICATIONS  (STANDING):  atorvastatin 40 milliGRAM(s) Oral at bedtime  cefepime   IVPB 2000 milliGRAM(s) IV Intermittent every 8 hours  chlorhexidine 0.12% Liquid 15 milliLiter(s) Oral Mucosa two times a day  chlorhexidine 4% Liquid 1 Application(s) Topical <User Schedule>  dextrose 50% Injectable 25 Gram(s) IV Push once  dextrose 50% Injectable 12.5 Gram(s) IV Push once  dextrose 50% Injectable 25 Gram(s) IV Push once  doxazosin 8 milliGRAM(s) Oral at bedtime  influenza   Vaccine 0.5 milliLiter(s) IntraMuscular once  insulin lispro (ADMELOG) corrective regimen sliding scale   SubCutaneous every 6 hours  insulin NPH human recombinant 13 Unit(s) SubCutaneous every 6 hours  labetalol 100 milliGRAM(s) Oral every 8 hours  modafinil 200 milliGRAM(s) Oral daily  multiple electrolytes Injection Type 1 1000 milliLiter(s) (75 mL/Hr) IV Continuous <Continuous>  pantoprazole  Injectable 40 milliGRAM(s) IV Push daily  polyethylene glycol 3350 17 Gram(s) Oral daily  senna 2 Tablet(s) Oral at bedtime        PHYSICAL EXAM:    General: calm  CVS: RRR  Pulm: CTAB  GI: Soft, NTND  Extremities: No LE Edema  Neuro: MELVI to nox, head movement side to side spont, 2mmpupils, +corneals, +overbreathing, RUE 1/5, LUE trace spont movement, BLE TF

## 2021-11-19 NOTE — PROGRESS NOTE ADULT - SUBJECTIVE AND OBJECTIVE BOX
INTERVAL HPI/OVERNIGHT EVENTS:  no events    MEDICATIONS  (STANDING):  atorvastatin 40 milliGRAM(s) Oral at bedtime  cefepime   IVPB 2000 milliGRAM(s) IV Intermittent every 8 hours  chlorhexidine 0.12% Liquid 15 milliLiter(s) Oral Mucosa two times a day  chlorhexidine 4% Liquid 1 Application(s) Topical <User Schedule>  dextrose 50% Injectable 25 Gram(s) IV Push once  dextrose 50% Injectable 12.5 Gram(s) IV Push once  dextrose 50% Injectable 25 Gram(s) IV Push once  doxazosin 8 milliGRAM(s) Oral at bedtime  influenza   Vaccine 0.5 milliLiter(s) IntraMuscular once  insulin lispro (ADMELOG) corrective regimen sliding scale   SubCutaneous every 6 hours  insulin NPH human recombinant 13 Unit(s) SubCutaneous every 6 hours  labetalol 100 milliGRAM(s) Oral every 8 hours  modafinil 200 milliGRAM(s) Oral daily  pantoprazole  Injectable 40 milliGRAM(s) IV Push daily  polyethylene glycol 3350 17 Gram(s) Oral daily  senna 2 Tablet(s) Oral at bedtime    MEDICATIONS  (PRN):  acetaminophen    Suspension .. 650 milliGRAM(s) Oral every 6 hours PRN Temp greater or equal to 38C (100.4F), Mild Pain (1 - 3)  fentaNYL    Injectable 25 MICROGram(s) IV Push every 4 hours PRN Moderate Pain (4 - 6)      Allergies    penicillin (Other)    Intolerances    Review of Systems:  nonverbal        Vital Signs Last 24 Hrs  T(C): 37 (2021 07:00), Max: 37.7 (2021 11:00)  T(F): 98.6 (2021 07:00), Max: 99.9 (2021 11:00)  HR: 67 (2021 09:10) (58 - 76)  BP: --  BP(mean): --  RR: 16 (2021 08:35) (14 - 19)  SpO2: 100% (2021 09:10) (99% - 100%)    PHYSICAL EXAM:     GENERAL:  Appears stated age, well-groomed, well-nourished, no distress  HEENT:  NC/AT,  conjunctivae anicteric, clear and pink,   NECK: supple, trachea midline  CHEST:  Full & symmetric excursion, no increased effort, breath sounds clear  HEART:  Regular rhythm, no JVD  ABDOMEN:  Soft, non-tender, non-distended, normoactive bowel sounds,  no masses , no hepatosplenomegaly  EXTREMITIES:  no cyanosis, clubbing or edema  SKIN:  No rash, erythema, or, ecchymoses, no jaundice  NEURO:  non-focal, no asterixis  PSYCH calm, nonverbal  RECTAL: Deferred        LABS:                        8.0    9.17  )-----------( 280      ( 2021 07:01 )             25.1         138  |  104  |  40<H>  ----------------------------<  143<H>  4.3   |  25  |  1.24    Ca    7.6<L>      2021 00:04  Phos  3.5       Mg     2.2           PT/INR - ( 2021 00:04 )   PT: 12.6 sec;   INR: 1.05 ratio         PTT - ( 2021 00:04 )  PTT:29.3 sec  Urinalysis Basic - ( 2021 23:29 )    Color: Light Yellow / Appearance: Clear / S.014 / pH: x  Gluc: x / Ketone: Negative  / Bili: Negative / Urobili: Negative   Blood: x / Protein: Trace / Nitrite: Negative   Leuk Esterase: Negative / RBC: 5 /hpf / WBC 5 /HPF   Sq Epi: x / Non Sq Epi: 1 /hpf / Bacteria: Negative        RADIOLOGY & ADDITIONAL TESTS:

## 2021-11-19 NOTE — PRE-ANESTHESIA EVALUATION ADULT - NSANTHADDINFOFT_GEN_ALL_CORE
spoke with family as soon as was told about the case, chart and pt's status reviewed, family informed of very poor prognosis who consented to anesthesia, proceed to OR on emergency basis
Chart reviewed, including medical and cardiac workup. Informed consent obtained, including all R/B/A.
All r/b/a discussed with spouse Dylan Camacho over phone.  All questions answered.

## 2021-11-19 NOTE — PROGRESS NOTE ADULT - SUBJECTIVE AND OBJECTIVE BOX
POD/STATUS POST/ENT ISSUE: POD #0 for trach     INTERVAL HPI: 62 y/o male POD #0 for tracheostomy #8 shiley cuffed with surgicel in place 2/2 respiratory failure. Pt is doing well on the vent. no complaints or issues.     ICU Vital Signs Last 24 Hrs  T(C): 37.5 (19 Nov 2021 20:00), Max: 37.5 (19 Nov 2021 15:00)  T(F): 99.5 (19 Nov 2021 20:00), Max: 99.5 (19 Nov 2021 15:00)  HR: 75 (19 Nov 2021 20:00) (58 - 105)  BP: 151/63 (19 Nov 2021 16:02) (151/63 - 151/63)  BP(mean): --  ABP: 139/62 (19 Nov 2021 20:00) (115/93 - 179/70)  ABP(mean): 85 (19 Nov 2021 20:00) (75 - 105)  RR: 17 (19 Nov 2021 20:00) (12 - 24)  SpO2: 100% (19 Nov 2021 20:00) (99% - 100%)                              8.0    9.17  )-----------( 280      ( 19 Nov 2021 07:01 )             25.1       11-19    138  |  104  |  40<H>  ----------------------------<  143<H>  4.3   |  25  |  1.24    Ca    7.6<L>      19 Nov 2021 00:04  Phos  3.5     11-19  Mg     2.2     11-19      PHYSICAL EXAM:  Gen: NAD, well-developed  Head: Normocephalic, Atraumatic  Face: no edema/erythema/fluctuance  Eyes:  no scleral injection  Nose: Nares bilaterally patent, no discharge  Mouth: No Stridor / Drooling / Trismus.  Mucosa moist, tongue/uvula midline, oropharynx clear  Neck: #8 shiley inflated cuff and surgicel in place, with minimal serosanguinous oozing from stoma, no active bleeding, sutures x4 and umbilical tie in place.  No lymphadenopathy, trachea midline, no masses  Resp: ventilating well, satting   CV: no peripheral edema/cyanosis

## 2021-11-19 NOTE — PROGRESS NOTE ADULT - ASSESSMENT
60 yo man admitted 11/4 w/ HA;  CT head with posterior fossa hemorrhage c/b severe IVH with casting of the 4th and 3rd ventricles with hydrocephalus, s/p EVD and SOC, s/p PICA aneurysm resection, planned for trach/peg.  ICH score 4    Neuro:  s/p EEG without seizures  s/p Dex taper for edema  - neurochecks q4h  - ongoing GOC  - modafinil 200mg    - -   - SBP goal: 100-160; c/w labetalol 100q8h, rate controlled  - s/p trach today, trach to ventl RCU consult  - NPO except for meds s/p new PEG today; d/c PPI  - NPH 13q6h; half dose while NPO  - c/w cefepime for tracheobronchitis end date 11/21  - DVT PPX: SCDs, SQH held for trach/peg   62 yo man admitted 11/4 w/ HA; CT head with posterior fossa hemorrhage c/b severe IVH with casting of the 4th and 3rd ventricles with hydrocephalus, s/p EVD and SOC, s/p PICA aneurysm resection, s/p trach/peg.  ICH score 4    Neuro:  s/p EEG without seizures  s/p Dex taper for edema  - neurochecks q4h  - ongoing GOC  - modafinil 200mg    - -   - SBP goal: 100-160; c/w labetalol 100q8h, rate controlled  - s/p trach today, trach to ventl RCU consult  - NPO except for meds s/p new PEG today; d/c PPI  - NPH 13q6h; half dose while NPO  - c/w cefepime for tracheobronchitis end date 11/21  - DVT PPX: SCDs, SQH held for trach/peg

## 2021-11-19 NOTE — PROGRESS NOTE ADULT - SUBJECTIVE AND OBJECTIVE BOX
Preop Dx: respiratory failure  Surgeon: Chase  Procedure: tracheostomy     Vital Signs Last 24 Hrs  T(C): 37.5 (18 Nov 2021 19:00), Max: 37.7 (18 Nov 2021 11:00)  T(F): 99.5 (18 Nov 2021 19:00), Max: 99.9 (18 Nov 2021 11:00)  HR: 70 (18 Nov 2021 21:20) (64 - 89)  BP: --  BP(mean): --  RR: 16 (18 Nov 2021 19:00) (14 - 17)  SpO2: 100% (18 Nov 2021 21:20) (92% - 100%)  CBC Full  -  ( 17 Nov 2021 23:29 )  WBC Count : 10.89 K/uL  RBC Count : 2.52 M/uL  Hemoglobin : 7.6 g/dL  Hematocrit : 23.2 %  Platelet Count - Automated : 252 K/uL  Mean Cell Volume : 92.1 fl  Mean Cell Hemoglobin : 30.2 pg  Mean Cell Hemoglobin Concentration : 32.8 gm/dL  Auto Neutrophil # : x  Auto Lymphocyte # : x  Auto Monocyte # : x  Auto Eosinophil # : x  Auto Basophil # : x  Auto Neutrophil % : x  Auto Lymphocyte % : x  Auto Monocyte % : x  Auto Eosinophil % : x  Auto Basophil % : x    11-17    138  |  101  |  38<H>  ----------------------------<  117<H>  4.3   |  24  |  1.37<H>    Ca    7.7<L>      17 Nov 2021 23:29  Phos  4.5     11-17  Mg     2.1     11-17        Daily     Daily     EKG:  Ventricular Rate 156 BPM    Atrial Rate 147 BPM    QRS Duration 84 ms    Q-T Interval 302 ms    QTC Calculation(Bazett) 486 ms    R Axis 36 degrees    T Axis 61 degrees    Diagnosis Line ATRIAL FIBRILLATION WITH RAPID VENTRICULAR RESPONSE  MODERATE VOLTAGE CRITERIA FOR LVH, MAY BE NORMAL VARIANT  NONSPECIFIC ST AND T WAVE ABNORMALITY  ABNORMAL ECG  NO PREVIOUS ECGS AVAILABLE  Confirmed by MD Liu Ronald ([id number]) on 11/6/2021 5:04:01 PM      CXR:   EXAM:  XR CHEST PORTABLE ROUTINE 1V                          EXAM:  XR CHEST PORTABLE ROUTINE 1V                            PROCEDURE DATE:  11/17/2021            INTERPRETATION:  CLINICAL INFORMATION: Enteric tube placement.    EXAM: Frontal radiograph of the chest.    COMPARISON: Chest x-ray 11/17/2021.    FINDINGS:    Chest x-ray from 11/17/2021 7:32 PM:    Enteric tube with tip in stomach. Endotracheal tube with tip in mid trachea. Partially imaged lungs are clear. No pleural effusion.    Heart size is normal.    Chest x-ray from 11/18/2021:    Enteric tube courses below the diaphragm with tip not imaged. Endotracheal tube with tip in mid trachea. The lungs are clear.      IMPRESSION:    Enteric tube courses below the diaphragm with tip likely in stomach.    --- End of Report ---    TAD MEDINA MD; Resident Radiologist  This document has been electronically signed.  REENA CERDA MD; Attending Radiologist  This document has been electronically signed. Nov 18 2021 11:33AM    Type and Screen: O+    Plan:  - OR 11/19/21 for tracheostomy with Dr. Stone  - NPO after midnight except meds  - IVF while NPO  - Consent done  - Medical clearance for OR

## 2021-11-19 NOTE — PRE-ANESTHESIA EVALUATION ADULT - NSANTHAIRWAYFT_ENT_ALL_CORE
Intubated
Normal external anatomy, intubated. Patient unable to follow commands
ETT insitu
Intubated

## 2021-11-19 NOTE — BRIEF OPERATIVE NOTE - COMMENTS
Pt tolerated the procedure well without complications. A #8 sheiley DCT trach placed, 1 surgicel placed

## 2021-11-20 LAB
ANION GAP SERPL CALC-SCNC: 9 MMOL/L — SIGNIFICANT CHANGE UP (ref 5–17)
APTT BLD: 30.8 SEC — SIGNIFICANT CHANGE UP (ref 27.5–35.5)
BUN SERPL-MCNC: 34 MG/DL — HIGH (ref 7–23)
CALCIUM SERPL-MCNC: 8.1 MG/DL — LOW (ref 8.4–10.5)
CHLORIDE SERPL-SCNC: 101 MMOL/L — SIGNIFICANT CHANGE UP (ref 96–108)
CO2 SERPL-SCNC: 25 MMOL/L — SIGNIFICANT CHANGE UP (ref 22–31)
CREAT SERPL-MCNC: 1.1 MG/DL — SIGNIFICANT CHANGE UP (ref 0.5–1.3)
CULTURE RESULTS: SIGNIFICANT CHANGE UP
CULTURE RESULTS: SIGNIFICANT CHANGE UP
GAS PNL BLDA: SIGNIFICANT CHANGE UP
GLUCOSE SERPL-MCNC: 159 MG/DL — HIGH (ref 70–99)
HCT VFR BLD CALC: 24.1 % — LOW (ref 39–50)
HGB BLD-MCNC: 7.8 G/DL — LOW (ref 13–17)
INR BLD: 1.17 RATIO — HIGH (ref 0.88–1.16)
MAGNESIUM SERPL-MCNC: 2.2 MG/DL — SIGNIFICANT CHANGE UP (ref 1.6–2.6)
MCHC RBC-ENTMCNC: 30.4 PG — SIGNIFICANT CHANGE UP (ref 27–34)
MCHC RBC-ENTMCNC: 32.4 GM/DL — SIGNIFICANT CHANGE UP (ref 32–36)
MCV RBC AUTO: 93.8 FL — SIGNIFICANT CHANGE UP (ref 80–100)
NRBC # BLD: 0 /100 WBCS — SIGNIFICANT CHANGE UP (ref 0–0)
PHOSPHATE SERPL-MCNC: 3.4 MG/DL — SIGNIFICANT CHANGE UP (ref 2.5–4.5)
PLATELET # BLD AUTO: 362 K/UL — SIGNIFICANT CHANGE UP (ref 150–400)
POTASSIUM SERPL-MCNC: 4 MMOL/L — SIGNIFICANT CHANGE UP (ref 3.5–5.3)
POTASSIUM SERPL-SCNC: 4 MMOL/L — SIGNIFICANT CHANGE UP (ref 3.5–5.3)
PROTHROM AB SERPL-ACNC: 13.9 SEC — HIGH (ref 10.6–13.6)
RBC # BLD: 2.57 M/UL — LOW (ref 4.2–5.8)
RBC # FLD: 13.5 % — SIGNIFICANT CHANGE UP (ref 10.3–14.5)
SODIUM SERPL-SCNC: 135 MMOL/L — SIGNIFICANT CHANGE UP (ref 135–145)
SPECIMEN SOURCE: SIGNIFICANT CHANGE UP
SPECIMEN SOURCE: SIGNIFICANT CHANGE UP
WBC # BLD: 10.72 K/UL — HIGH (ref 3.8–10.5)
WBC # FLD AUTO: 10.72 K/UL — HIGH (ref 3.8–10.5)

## 2021-11-20 PROCEDURE — 93970 EXTREMITY STUDY: CPT | Mod: 26

## 2021-11-20 PROCEDURE — 71045 X-RAY EXAM CHEST 1 VIEW: CPT | Mod: 26,59

## 2021-11-20 PROCEDURE — 99233 SBSQ HOSP IP/OBS HIGH 50: CPT

## 2021-11-20 PROCEDURE — 99231 SBSQ HOSP IP/OBS SF/LOW 25: CPT

## 2021-11-20 RX ORDER — HEPARIN SODIUM 5000 [USP'U]/ML
5000 INJECTION INTRAVENOUS; SUBCUTANEOUS EVERY 8 HOURS
Refills: 0 | Status: DISCONTINUED | OUTPATIENT
Start: 2021-11-20 | End: 2021-11-22

## 2021-11-20 RX ORDER — OXYCODONE HYDROCHLORIDE 5 MG/1
5 TABLET ORAL EVERY 4 HOURS
Refills: 0 | Status: DISCONTINUED | OUTPATIENT
Start: 2021-11-20 | End: 2021-11-21

## 2021-11-20 RX ORDER — MULTIVIT WITH MIN/MFOLATE/K2 340-15/3 G
1 POWDER (GRAM) ORAL ONCE
Refills: 0 | Status: COMPLETED | OUTPATIENT
Start: 2021-11-20 | End: 2021-11-20

## 2021-11-20 RX ORDER — OXYCODONE HYDROCHLORIDE 5 MG/1
10 TABLET ORAL EVERY 4 HOURS
Refills: 0 | Status: DISCONTINUED | OUTPATIENT
Start: 2021-11-20 | End: 2021-11-21

## 2021-11-20 RX ORDER — HUMAN INSULIN 100 [IU]/ML
7 INJECTION, SUSPENSION SUBCUTANEOUS ONCE
Refills: 0 | Status: COMPLETED | OUTPATIENT
Start: 2021-11-20 | End: 2021-11-20

## 2021-11-20 RX ADMIN — CEFEPIME 100 MILLIGRAM(S): 1 INJECTION, POWDER, FOR SOLUTION INTRAMUSCULAR; INTRAVENOUS at 13:49

## 2021-11-20 RX ADMIN — Medication 8 MILLIGRAM(S): at 21:33

## 2021-11-20 RX ADMIN — HEPARIN SODIUM 5000 UNIT(S): 5000 INJECTION INTRAVENOUS; SUBCUTANEOUS at 13:49

## 2021-11-20 RX ADMIN — CHLORHEXIDINE GLUCONATE 15 MILLILITER(S): 213 SOLUTION TOPICAL at 05:42

## 2021-11-20 RX ADMIN — Medication 1 DROP(S): at 00:32

## 2021-11-20 RX ADMIN — Medication 100 MILLIGRAM(S): at 17:33

## 2021-11-20 RX ADMIN — Medication 1 DROP(S): at 12:21

## 2021-11-20 RX ADMIN — CHLORHEXIDINE GLUCONATE 15 MILLILITER(S): 213 SOLUTION TOPICAL at 18:00

## 2021-11-20 RX ADMIN — ATORVASTATIN CALCIUM 40 MILLIGRAM(S): 80 TABLET, FILM COATED ORAL at 21:34

## 2021-11-20 RX ADMIN — Medication 100 MILLIGRAM(S): at 09:56

## 2021-11-20 RX ADMIN — CEFEPIME 100 MILLIGRAM(S): 1 INJECTION, POWDER, FOR SOLUTION INTRAMUSCULAR; INTRAVENOUS at 21:32

## 2021-11-20 RX ADMIN — POLYETHYLENE GLYCOL 3350 17 GRAM(S): 17 POWDER, FOR SOLUTION ORAL at 12:49

## 2021-11-20 RX ADMIN — HUMAN INSULIN 13 UNIT(S): 100 INJECTION, SUSPENSION SUBCUTANEOUS at 17:30

## 2021-11-20 RX ADMIN — Medication 1 BOTTLE: at 12:49

## 2021-11-20 RX ADMIN — HEPARIN SODIUM 5000 UNIT(S): 5000 INJECTION INTRAVENOUS; SUBCUTANEOUS at 21:34

## 2021-11-20 RX ADMIN — HUMAN INSULIN 7 UNIT(S): 100 INJECTION, SUSPENSION SUBCUTANEOUS at 06:01

## 2021-11-20 RX ADMIN — SENNA PLUS 2 TABLET(S): 8.6 TABLET ORAL at 21:34

## 2021-11-20 RX ADMIN — CHLORHEXIDINE GLUCONATE 1 APPLICATION(S): 213 SOLUTION TOPICAL at 21:34

## 2021-11-20 RX ADMIN — HUMAN INSULIN 13 UNIT(S): 100 INJECTION, SUSPENSION SUBCUTANEOUS at 12:50

## 2021-11-20 RX ADMIN — MODAFINIL 200 MILLIGRAM(S): 200 TABLET ORAL at 12:49

## 2021-11-20 RX ADMIN — Medication 4: at 17:29

## 2021-11-20 RX ADMIN — CEFEPIME 100 MILLIGRAM(S): 1 INJECTION, POWDER, FOR SOLUTION INTRAMUSCULAR; INTRAVENOUS at 05:43

## 2021-11-20 RX ADMIN — Medication 1 DROP(S): at 18:00

## 2021-11-20 RX ADMIN — Medication 100 MILLIGRAM(S): at 00:32

## 2021-11-20 RX ADMIN — Medication 1 DROP(S): at 05:42

## 2021-11-20 NOTE — PROGRESS NOTE ADULT - SUBJECTIVE AND OBJECTIVE BOX
Patient seen and examined at bedside.    --Anticoagulation--    T(C): 37.7 (11-20-21 @ 03:00), Max: 37.7 (11-20-21 @ 03:00)  HR: 72 (11-20-21 @ 03:00) (58 - 105)  BP: 151/63 (11-19-21 @ 16:02) (151/63 - 151/63)  RR: 16 (11-20-21 @ 03:00) (12 - 24)  SpO2: 100% (11-20-21 @ 03:00) (100% - 100%)  Wt(kg): --    Exam:    trachd, weak corneals, pupils 3 NR, cough, no FC, flaccid

## 2021-11-20 NOTE — PROGRESS NOTE ADULT - PROBLEM SELECTOR PLAN 1
- continue vent per RT/ICU  - gentle suction prn   - continue with trach site care, keep dry and clean   - call ENT with any issues.

## 2021-11-20 NOTE — PROGRESS NOTE ADULT - ATTENDING COMMENTS
s/p trach/PEG.  RCU consult pending.  neuro exam grossly unchanged, consider stopping modafinil if it is not helping.

## 2021-11-20 NOTE — PROGRESS NOTE ADULT - ASSESSMENT
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 EVD clamped 11/13      NEURO:  ICH score: 4   neuro checks q4  - pain control  - modafinil 200mg in AM     PULM:  Intubated for airway protection  - CPAP trials - abg   - vent bundle  - chest PT/ Mucomyst   - trach plan for today - ent following   -RCU c/s     CV:  Afib RVR on admission  - SBP goal 100-160  - cardene PRN to goal  - labetalol 100mg q8h will  -keep rajani until after procedure    RENAL:  Baseline Cr 1.24, currently 1.4  Na goal >145  start IVF plasmalyte 75cc/hr while NPO   - trend lytes  - daily IOs  -IVL    GI:   Diet: TF @ 80  - GI prophylaxis: PPI while intubated  - Bowel regimen - senna add Doculax suppository   -Peg today      ENDO:   A1c 6.4%  - FS goal 120-180  - HISS  -hypoglyemia hold insulin as pt NPO    HEME/ONC:  LED negative 11/8  - SCDs  - Chemoppx: heparin SQ    ID:  cefepime end date 11/21 (7 days) for enterobacter PNA    CODE STATUS:  [x] Full Code [] DNR [] DNI [] Palliative/Comfort Care    DISPOSITION:  [x] ICU [] Stroke Unit [] Floor [] EMU [] RCU [] PCU   ASSESSMENT: 62 yo man hx of HTN, DM, admitted 11/4 with HA/N/V followed by unresponsiveness, CT head with posterior fossa hemorrhage c/b severe IVH with casting of the 4th and 3rd ventricles with hydrocephalus, s/p EVD and SOC, 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 EVD clamped & removed       NEURO:  ICH score: 4  - neuro checks q4  - pain control  - modafinil 200mg no improvement in neurologic function will d/c tomorrow if continues to have no improvement     PULM:  s/p trach   - CPAP trials   - vent bundle  - chest PT  - RCU c/s   -PNA management as below     CV:  Afib RVR on admission  - SBP goal 100-160  - labetalol 100mg q8h will  -d/c a-line    RENAL:  Baseline Cr 1.24, currently 1.4  Na goal >145  IVL  - trend lytes    GI:   Diet: TF @ 20 gradual increase to goal 80  - GI prophylaxis: d/c protonix   - Bowel regimen - Mg citrate to be added today  - s/p PEG 11/20      ENDO:   A1c 6.4%  - FS goal 120-180  - HISS      HEME/ONC:  LED negative 11/8, 11/15 left soilel DVT repeat LE doppler today to check for propagation   - SCDs  - Chemoppx: heparin SQ      ID:  cefepime end date 11/21 (7 days) for enterobacter PNA    CODE STATUS:  [x] Full Code [] DNR [] DNI [] Palliative/Comfort Care    DISPOSITION:  [] ICU [] Stroke Unit [] Floor [] EMU [x] RCU [] PCU

## 2021-11-20 NOTE — PROGRESS NOTE ADULT - ASSESSMENT
61 year old man with respiratory failure d/t ICH    1. ICH  -per neurosurgery, s/p EVD    2. Respiratory failure  -for tracheostomy today, will require long term enteral nutrition  -s/p PEG, initiate G tube feeding    3. Enterobacter PNA  -on cefepime    4. afib with RVR    5. Anemia, no overt GI bleed. EGD unremarkable.  -trend CBC    The plan of care was discussed with the physician assistant and modifications were made to the notation where appropriate.   Differential diagnosis and plan of care discussed with patient after the evaluation  35 minutes spent on total encounter of which more than fifty percent of the encounter was spent counseling and/or coordinating care by the attending physician.    [facility]  Gastroenterology and Hepatology  [address] [place], [place]  Office: [phone]  Cell: [phone]

## 2021-11-20 NOTE — PROGRESS NOTE ADULT - ASSESSMENT
62 y/o male POD #1 for tracheostomy 2/2 respiratory failure. Pt is doing well, #8 shiley cuffed with surgicel x1 removed, stable on the vent, with minimal serosanguinous oozing from stoma, no active bleed. sutures x4 and umbilical tie in place.

## 2021-11-20 NOTE — PROGRESS NOTE ADULT - SUBJECTIVE AND OBJECTIVE BOX
24 hr EVENTS: fsg on the low side       EXAMINATION:  General:  calm  HEENT:  MMM  Neuro:  MELVI to nox, non reactive, +cough/gag ,+overbreathes, no corneals, does not follow commands, localizing left upper   Cards:  RRR  Respiratory:  no respiratory distress  Abdomen:  soft  Extremities:  no edems            ICU Vital Signs Last 24 Hrs  T(C): 37.7 (20 Nov 2021 03:00), Max: 37.7 (20 Nov 2021 03:00)  T(F): 99.9 (20 Nov 2021 03:00), Max: 99.9 (20 Nov 2021 03:00)  HR: 78 (20 Nov 2021 05:00) (60 - 105)  BP: 151/63 (19 Nov 2021 16:02) (151/63 - 151/63)  BP(mean): --  ABP: 118/56 (20 Nov 2021 05:00) (112/51 - 172/74)  ABP(mean): 73 (20 Nov 2021 05:00) (68 - 104)  RR: 17 (20 Nov 2021 05:00) (12 - 24)  SpO2: 100% (20 Nov 2021 05:00) (100% - 100%)      11-18-21 @ 07:01  -  11-19-21 @ 07:00  --------------------------------------------------------  IN: 2010 mL / OUT: 2320 mL / NET: -310 mL    11-19-21 @ 07:01  -  11-20-21 @ 06:20  --------------------------------------------------------  IN: 1675 mL / OUT: 2575 mL / NET: -900 mL        Mode: AC/ CMV (Assist Control/ Continuous Mandatory Ventilation), RR (machine): 16, TV (machine): 450, FiO2: 30, PEEP: 5, ITime: 1, MAP: 7, PIP: 14    acetaminophen    Suspension .. 650 milliGRAM(s) Oral every 6 hours PRN  artificial  tears Solution 1 Drop(s) Both EYES every 6 hours  atorvastatin 40 milliGRAM(s) Oral at bedtime  cefepime   IVPB 2000 milliGRAM(s) IV Intermittent every 8 hours  chlorhexidine 0.12% Liquid 15 milliLiter(s) Oral Mucosa two times a day  chlorhexidine 4% Liquid 1 Application(s) Topical <User Schedule>  dextrose 50% Injectable 25 Gram(s) IV Push once  dextrose 50% Injectable 12.5 Gram(s) IV Push once  dextrose 50% Injectable 25 Gram(s) IV Push once  doxazosin 8 milliGRAM(s) Oral at bedtime  fentaNYL    Injectable 25 MICROGram(s) IV Push every 4 hours PRN  influenza   Vaccine 0.5 milliLiter(s) IntraMuscular once  insulin lispro (ADMELOG) corrective regimen sliding scale   SubCutaneous every 6 hours  insulin NPH human recombinant 13 Unit(s) SubCutaneous every 6 hours  labetalol 100 milliGRAM(s) Oral every 8 hours  modafinil 200 milliGRAM(s) Oral daily  multiple electrolytes Injection Type 1 1000 milliLiter(s) (75 mL/Hr) IV Continuous <Continuous>  pantoprazole  Injectable 40 milliGRAM(s) IV Push daily  polyethylene glycol 3350 17 Gram(s) Oral daily  senna 2 Tablet(s) Oral at bedtime      LABS:  Na: 136 (11-19 @ 23:19), 138 (11-19 @ 00:04), 138 (11-17 @ 23:29)  K: 4.7 (11-19 @ 23:19), 4.3 (11-19 @ 00:04), 4.3 (11-17 @ 23:29)  Cl: 102 (11-19 @ 23:19), 104 (11-19 @ 00:04), 101 (11-17 @ 23:29)  CO2: 23 (11-19 @ 23:19), 25 (11-19 @ 00:04), 24 (11-17 @ 23:29)  BUN: 35 (11-19 @ 23:19), 40 (11-19 @ 00:04), 38 (11-17 @ 23:29)  Cr: 1.18 (11-19 @ 23:19), 1.24 (11-19 @ 00:04), 1.37 (11-17 @ 23:29)  Glu: 161(11-19 @ 23:19), 143(11-19 @ 00:04), 117(11-17 @ 23:29)    Hgb: 8.3 (11-19 @ 23:19), 8.0 (11-19 @ 07:01), 7.0 (11-19 @ 00:04), 7.6 (11-17 @ 23:29)  Hct: 25.1 (11-19 @ 23:19), 25.1 (11-19 @ 07:01), 22.1 (11-19 @ 00:04), 23.2 (11-17 @ 23:29)  WBC: 14.30 (11-19 @ 23:19), 9.17 (11-19 @ 07:01), 9.59 (11-19 @ 00:04), 10.89 (11-17 @ 23:29)  Plt: 316 (11-19 @ 23:19), 280 (11-19 @ 07:01), 277 (11-19 @ 00:04), 252 (11-17 @ 23:29)    INR: 1.05 11-19-21 @ 00:04  PTT: 29.3 11-19-21 @ 00:04                           24 hr EVENTS: s/p trach & PEG    EXAMINATION:  General:  calm  HEENT:  MMM  Neuro:  MELVI to nox, non reactive, +cough/gag ,+overbreathes, no corneals, does not follow commands, localizing left upper   Cards:  RRR  Respiratory:  no respiratory distress  Abdomen:  soft  Extremities:  no edems    ICU Vital Signs Last 24 Hrs  T(C): 37.7 (20 Nov 2021 03:00), Max: 37.7 (20 Nov 2021 03:00)  T(F): 99.9 (20 Nov 2021 03:00), Max: 99.9 (20 Nov 2021 03:00)  HR: 78 (20 Nov 2021 05:00) (60 - 105)  BP: 151/63 (19 Nov 2021 16:02) (151/63 - 151/63)  ABP: 118/56 (20 Nov 2021 05:00) (112/51 - 172/74)  ABP(mean): 73 (20 Nov 2021 05:00) (68 - 104)  RR: 17 (20 Nov 2021 05:00) (12 - 24)  SpO2: 100% (20 Nov 2021 05:00) (100% - 100%)      11-18-21 @ 07:01  -  11-19-21 @ 07:00  --------------------------------------------------------  IN: 2010 mL / OUT: 2320 mL / NET: -310 mL    11-19-21 @ 07:01  -  11-20-21 @ 06:20  --------------------------------------------------------  IN: 1675 mL / OUT: 2575 mL / NET: -900 mL        Mode: AC/ CMV (Assist Control/ Continuous Mandatory Ventilation), RR (machine): 16, TV (machine): 450, FiO2: 30, PEEP: 5, ITime: 1, MAP: 7, PIP: 14    acetaminophen    Suspension .. 650 milliGRAM(s) Oral every 6 hours PRN  artificial  tears Solution 1 Drop(s) Both EYES every 6 hours  atorvastatin 40 milliGRAM(s) Oral at bedtime  cefepime   IVPB 2000 milliGRAM(s) IV Intermittent every 8 hours  chlorhexidine 0.12% Liquid 15 milliLiter(s) Oral Mucosa two times a day  chlorhexidine 4% Liquid 1 Application(s) Topical <User Schedule>  dextrose 50% Injectable 25 Gram(s) IV Push once  dextrose 50% Injectable 12.5 Gram(s) IV Push once  dextrose 50% Injectable 25 Gram(s) IV Push once  doxazosin 8 milliGRAM(s) Oral at bedtime  fentaNYL    Injectable 25 MICROGram(s) IV Push every 4 hours PRN  influenza   Vaccine 0.5 milliLiter(s) IntraMuscular once  insulin lispro (ADMELOG) corrective regimen sliding scale   SubCutaneous every 6 hours  insulin NPH human recombinant 13 Unit(s) SubCutaneous every 6 hours  labetalol 100 milliGRAM(s) Oral every 8 hours  modafinil 200 milliGRAM(s) Oral daily  multiple electrolytes Injection Type 1 1000 milliLiter(s) (75 mL/Hr) IV Continuous <Continuous>  pantoprazole  Injectable 40 milliGRAM(s) IV Push daily  polyethylene glycol 3350 17 Gram(s) Oral daily  senna 2 Tablet(s) Oral at bedtime      LABS:  Na: 136 (11-19 @ 23:19), 138 (11-19 @ 00:04), 138 (11-17 @ 23:29)  K: 4.7 (11-19 @ 23:19), 4.3 (11-19 @ 00:04), 4.3 (11-17 @ 23:29)  Cl: 102 (11-19 @ 23:19), 104 (11-19 @ 00:04), 101 (11-17 @ 23:29)  CO2: 23 (11-19 @ 23:19), 25 (11-19 @ 00:04), 24 (11-17 @ 23:29)  BUN: 35 (11-19 @ 23:19), 40 (11-19 @ 00:04), 38 (11-17 @ 23:29)  Cr: 1.18 (11-19 @ 23:19), 1.24 (11-19 @ 00:04), 1.37 (11-17 @ 23:29)  Glu: 161(11-19 @ 23:19), 143(11-19 @ 00:04), 117(11-17 @ 23:29)    Hgb: 8.3 (11-19 @ 23:19), 8.0 (11-19 @ 07:01), 7.0 (11-19 @ 00:04), 7.6 (11-17 @ 23:29)  Hct: 25.1 (11-19 @ 23:19), 25.1 (11-19 @ 07:01), 22.1 (11-19 @ 00:04), 23.2 (11-17 @ 23:29)  WBC: 14.30 (11-19 @ 23:19), 9.17 (11-19 @ 07:01), 9.59 (11-19 @ 00:04), 10.89 (11-17 @ 23:29)  Plt: 316 (11-19 @ 23:19), 280 (11-19 @ 07:01), 277 (11-19 @ 00:04), 252 (11-17 @ 23:29)    INR: 1.05 11-19-21 @ 00:04  PTT: 29.3 11-19-21 @ 00:04

## 2021-11-20 NOTE — PROGRESS NOTE ADULT - ASSESSMENT
ANGLE ALICIA  61M pmhx HTN, DM at work complaining of HA ~8:30, starting feeling dizzy and vomiting, HTN/keke when EMS got to him. SBP 220s, HR 50s. CTH shows large posterior fossa bleed w/ IVH/SAH/hydro.   -Pending xfer RCU

## 2021-11-21 LAB
ANION GAP SERPL CALC-SCNC: 7 MMOL/L — SIGNIFICANT CHANGE UP (ref 5–17)
BUN SERPL-MCNC: 33 MG/DL — HIGH (ref 7–23)
CALCIUM SERPL-MCNC: 8.1 MG/DL — LOW (ref 8.4–10.5)
CHLORIDE SERPL-SCNC: 106 MMOL/L — SIGNIFICANT CHANGE UP (ref 96–108)
CO2 SERPL-SCNC: 26 MMOL/L — SIGNIFICANT CHANGE UP (ref 22–31)
CREAT SERPL-MCNC: 1.17 MG/DL — SIGNIFICANT CHANGE UP (ref 0.5–1.3)
GLUCOSE SERPL-MCNC: 104 MG/DL — HIGH (ref 70–99)
HCT VFR BLD CALC: 22.9 % — LOW (ref 39–50)
HGB BLD-MCNC: 7.3 G/DL — LOW (ref 13–17)
MAGNESIUM SERPL-MCNC: 2.4 MG/DL — SIGNIFICANT CHANGE UP (ref 1.6–2.6)
MCHC RBC-ENTMCNC: 30 PG — SIGNIFICANT CHANGE UP (ref 27–34)
MCHC RBC-ENTMCNC: 31.9 GM/DL — LOW (ref 32–36)
MCV RBC AUTO: 94.2 FL — SIGNIFICANT CHANGE UP (ref 80–100)
NRBC # BLD: 0 /100 WBCS — SIGNIFICANT CHANGE UP (ref 0–0)
PHOSPHATE SERPL-MCNC: 3.2 MG/DL — SIGNIFICANT CHANGE UP (ref 2.5–4.5)
PLATELET # BLD AUTO: 388 K/UL — SIGNIFICANT CHANGE UP (ref 150–400)
POTASSIUM SERPL-MCNC: 4.1 MMOL/L — SIGNIFICANT CHANGE UP (ref 3.5–5.3)
POTASSIUM SERPL-SCNC: 4.1 MMOL/L — SIGNIFICANT CHANGE UP (ref 3.5–5.3)
RBC # BLD: 2.43 M/UL — LOW (ref 4.2–5.8)
RBC # FLD: 13.7 % — SIGNIFICANT CHANGE UP (ref 10.3–14.5)
SODIUM SERPL-SCNC: 139 MMOL/L — SIGNIFICANT CHANGE UP (ref 135–145)
WBC # BLD: 10.18 K/UL — SIGNIFICANT CHANGE UP (ref 3.8–10.5)
WBC # FLD AUTO: 10.18 K/UL — SIGNIFICANT CHANGE UP (ref 3.8–10.5)

## 2021-11-21 PROCEDURE — 99233 SBSQ HOSP IP/OBS HIGH 50: CPT

## 2021-11-21 PROCEDURE — 99231 SBSQ HOSP IP/OBS SF/LOW 25: CPT

## 2021-11-21 RX ORDER — HUMAN INSULIN 100 [IU]/ML
15 INJECTION, SUSPENSION SUBCUTANEOUS EVERY 6 HOURS
Refills: 0 | Status: DISCONTINUED | OUTPATIENT
Start: 2021-11-21 | End: 2021-11-21

## 2021-11-21 RX ORDER — INSULIN LISPRO 100/ML
VIAL (ML) SUBCUTANEOUS EVERY 6 HOURS
Refills: 0 | Status: DISCONTINUED | OUTPATIENT
Start: 2021-11-21 | End: 2021-11-23

## 2021-11-21 RX ORDER — HUMAN INSULIN 100 [IU]/ML
17 INJECTION, SUSPENSION SUBCUTANEOUS EVERY 6 HOURS
Refills: 0 | Status: DISCONTINUED | OUTPATIENT
Start: 2021-11-21 | End: 2021-11-22

## 2021-11-21 RX ADMIN — Medication 1 DROP(S): at 23:28

## 2021-11-21 RX ADMIN — ATORVASTATIN CALCIUM 40 MILLIGRAM(S): 80 TABLET, FILM COATED ORAL at 21:37

## 2021-11-21 RX ADMIN — Medication 100 MILLIGRAM(S): at 08:15

## 2021-11-21 RX ADMIN — HUMAN INSULIN 17 UNIT(S): 100 INJECTION, SUSPENSION SUBCUTANEOUS at 18:06

## 2021-11-21 RX ADMIN — HUMAN INSULIN 13 UNIT(S): 100 INJECTION, SUSPENSION SUBCUTANEOUS at 00:28

## 2021-11-21 RX ADMIN — Medication 8 MILLIGRAM(S): at 21:37

## 2021-11-21 RX ADMIN — Medication 1 DROP(S): at 18:31

## 2021-11-21 RX ADMIN — CEFEPIME 100 MILLIGRAM(S): 1 INJECTION, POWDER, FOR SOLUTION INTRAMUSCULAR; INTRAVENOUS at 13:31

## 2021-11-21 RX ADMIN — Medication 8: at 05:46

## 2021-11-21 RX ADMIN — Medication 1 DROP(S): at 05:36

## 2021-11-21 RX ADMIN — HEPARIN SODIUM 5000 UNIT(S): 5000 INJECTION INTRAVENOUS; SUBCUTANEOUS at 21:37

## 2021-11-21 RX ADMIN — HEPARIN SODIUM 5000 UNIT(S): 5000 INJECTION INTRAVENOUS; SUBCUTANEOUS at 13:47

## 2021-11-21 RX ADMIN — Medication 1 DROP(S): at 00:28

## 2021-11-21 RX ADMIN — Medication 4: at 11:20

## 2021-11-21 RX ADMIN — Medication 1 DROP(S): at 11:21

## 2021-11-21 RX ADMIN — HEPARIN SODIUM 5000 UNIT(S): 5000 INJECTION INTRAVENOUS; SUBCUTANEOUS at 05:46

## 2021-11-21 RX ADMIN — POLYETHYLENE GLYCOL 3350 17 GRAM(S): 17 POWDER, FOR SOLUTION ORAL at 11:22

## 2021-11-21 RX ADMIN — HUMAN INSULIN 15 UNIT(S): 100 INJECTION, SUSPENSION SUBCUTANEOUS at 05:46

## 2021-11-21 RX ADMIN — CHLORHEXIDINE GLUCONATE 15 MILLILITER(S): 213 SOLUTION TOPICAL at 05:46

## 2021-11-21 RX ADMIN — CHLORHEXIDINE GLUCONATE 15 MILLILITER(S): 213 SOLUTION TOPICAL at 18:05

## 2021-11-21 RX ADMIN — Medication 8: at 00:27

## 2021-11-21 RX ADMIN — CEFEPIME 100 MILLIGRAM(S): 1 INJECTION, POWDER, FOR SOLUTION INTRAMUSCULAR; INTRAVENOUS at 05:36

## 2021-11-21 RX ADMIN — Medication 100 MILLIGRAM(S): at 15:49

## 2021-11-21 RX ADMIN — CEFEPIME 100 MILLIGRAM(S): 1 INJECTION, POWDER, FOR SOLUTION INTRAMUSCULAR; INTRAVENOUS at 21:38

## 2021-11-21 RX ADMIN — Medication 4: at 18:05

## 2021-11-21 RX ADMIN — HUMAN INSULIN 17 UNIT(S): 100 INJECTION, SUSPENSION SUBCUTANEOUS at 11:21

## 2021-11-21 RX ADMIN — CHLORHEXIDINE GLUCONATE 1 APPLICATION(S): 213 SOLUTION TOPICAL at 21:37

## 2021-11-21 NOTE — PROGRESS NOTE ADULT - SUBJECTIVE AND OBJECTIVE BOX
ENT ISSUE/POD: POD #2 for trach     INTERVAL HPI: 60 y/o male POD #2 for tracheostomy #8 shiley cuffed 2/2 respiratory failure. Pt is doing well on CPAP this am No reported issues.      PAST MEDICAL & SURGICAL HISTORY:  HTN (hypertension)    Diabetes mellitus      Allergies    penicillin (Other)    Intolerances      MEDICATIONS  (STANDING):  artificial  tears Solution 1 Drop(s) Both EYES every 6 hours  atorvastatin 40 milliGRAM(s) Oral at bedtime  cefepime   IVPB 2000 milliGRAM(s) IV Intermittent every 8 hours  chlorhexidine 0.12% Liquid 15 milliLiter(s) Oral Mucosa two times a day  chlorhexidine 4% Liquid 1 Application(s) Topical <User Schedule>  dextrose 50% Injectable 25 Gram(s) IV Push once  dextrose 50% Injectable 12.5 Gram(s) IV Push once  dextrose 50% Injectable 25 Gram(s) IV Push once  doxazosin 8 milliGRAM(s) Oral at bedtime  heparin   Injectable 5000 Unit(s) SubCutaneous every 8 hours  insulin lispro (ADMELOG) corrective regimen sliding scale   SubCutaneous every 6 hours  insulin NPH human recombinant 17 Unit(s) SubCutaneous every 6 hours  labetalol 100 milliGRAM(s) Oral every 8 hours  polyethylene glycol 3350 17 Gram(s) Oral daily  senna 2 Tablet(s) Oral at bedtime    MEDICATIONS  (PRN):  acetaminophen    Suspension .. 650 milliGRAM(s) Oral every 6 hours PRN Temp greater or equal to 38C (100.4F), Mild Pain (1 - 3)      ROS:   Unable to obtain due to pts clinical condition       Vital Signs Last 24 Hrs  T(C): 37.7 (21 Nov 2021 07:00), Max: 37.7 (21 Nov 2021 07:00)  T(F): 99.8 (21 Nov 2021 07:00), Max: 99.8 (21 Nov 2021 07:00)  HR: 88 (21 Nov 2021 09:14) (73 - 90)  BP: 105/68 (20 Nov 2021 19:00) (105/68 - 105/68)  BP(mean): 80 (20 Nov 2021 19:00) (80 - 80)  RR: 15 (21 Nov 2021 07:00) (12 - 18)  SpO2: 99% (21 Nov 2021 09:14) (99% - 100%)                          7.8    10.72 )-----------( 362      ( 20 Nov 2021 21:46 )             24.1    11-20    135  |  101  |  34<H>  ----------------------------<  159<H>  4.0   |  25  |  1.10    Ca    8.1<L>      20 Nov 2021 21:46  Phos  3.4     11-20  Mg     2.2     11-20     PT/INR - ( 20 Nov 2021 21:46 )   PT: 13.9 sec;   INR: 1.17 ratio         PTT - ( 20 Nov 2021 21:46 )  PTT:30.8 sec      PHYSICAL EXAM:  Gen: NAD, well-developed  Head: Normocephalic, Atraumatic  Face: no edema/erythema/fluctuance  Eyes:  no scleral injection  Nose: Nares bilaterally patent, no discharge  Mouth: No Stridor / Drooling / Trismus.  Mucosa moist, tongue/uvula midline, oropharynx clear  Neck: #8 shiley inflated cuff and surgicel x1 removed, with minimal serosanguinous oozing from stoma, no active bleeding, sutures x4 and umbilical tie in place.  No lymphadenopathy, trachea midline, no masses  Resp: tolerating CPAP   CV: no peripheral edema/cyanosis

## 2021-11-21 NOTE — PROGRESS NOTE ADULT - ASSESSMENT
61 year old man with respiratory failure d/t ICH    1. ICH  -per neurosurgery, s/p EVD    2. Respiratory failure  -for tracheostomy today, will require long term enteral nutrition  -s/p PEG, initiate G tube feeding    3. Enterobacter PNA  -on cefepime    4. afib with RVR    5. Anemia, no overt GI bleed. EGD unremarkable.  -trend CBC    The plan of care was discussed with the physician assistant and modifications were made to the notation where appropriate.   Differential diagnosis and plan of care discussed with patient after the evaluation  35 minutes spent on total encounter of which more than fifty percent of the encounter was spent counseling and/or coordinating care by the attending physician.    [facility]  Gastroenterology and Hepatology  [address] [place], [place]  Office: [phone]  Cell: [phone]

## 2021-11-21 NOTE — PROGRESS NOTE ADULT - SUBJECTIVE AND OBJECTIVE BOX
Nighttime rounds performed  refer to earlier note for detail  unchanged exam  -160, d/c labetalol, continue cardura  in sinus rhythm  switch to glucerna, elevated BG  RCU consult in am

## 2021-11-21 NOTE — PROGRESS NOTE ADULT - ASSESSMENT
ANGLEALICIA  61M pmhx HTN, DM at work complaining of HA ~8:30, starting feeling dizzy and vomiting, HTN/keke when EMS got to him. SBP 220s, HR 50s. CTH shows large posterior fossa bleed w/ IVH/SAH/hydro.   -RCU bed pending.

## 2021-11-21 NOTE — PROGRESS NOTE ADULT - ASSESSMENT
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 EVD clamped & removed       NEURO:  ICH score: 4  - neuro checks q4  - pain control  - modafinil 200mg no improvement in neurologic function will d/c tomorrow if continues to have no improvement     PULM:  s/p trach   - CPAP trials   - vent bundle  - chest PT  - RCU c/s   -PNA management as below     CV:  Afib RVR on admission  - SBP goal 100-160  - labetalol 100mg q8h will  -d/c a-line    RENAL:  Baseline Cr 1.24, currently 1.4  Na goal >145  IVL  - trend lytes    GI:   Diet: TF @ 20 gradual increase to goal 80  - GI prophylaxis: d/c protonix   - Bowel regimen - Mg citrate to be added today  - s/p PEG 11/20      ENDO:   A1c 6.4%  - FS goal 120-180  - HISS      HEME/ONC:  LED negative 11/8, 11/15 left soilel DVT repeat LE doppler today to check for propagation   - SCDs  - Chemoppx: heparin SQ      ID:  cefepime end date 11/21 (7 days) for enterobacter PNA    CODE STATUS:  [x] Full Code [] DNR [] DNI [] Palliative/Comfort Care    DISPOSITION:  [] ICU [] Stroke Unit [] Floor [] EMU [x] RCU [] PCU   ASSESSMENT: 62 yo man hx of HTN, DM, admitted 11/4 with HA/N/V followed by unresponsiveness, CT head with posterior fossa hemorrhage c/b severe IVH with casting of the 4th and 3rd ventricles with hydrocephalus, s/p EVD and SOC, 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 EVD clamped & removed       NEURO:  ICH score: 4  - neuro checks q4  - pain control  - modafinil 200mg no improvement in neurologic function will d/c tomorrow if continues to have no improvement   - plan for repeat angio at some point    PULM:  s/p trach   - CPAP trials   - vent bundle  - chest PT  - RCU following, plan for txer  - PNA management as below     CV:  Afib RVR on admission  - SBP goal 100-160  - labetalol 100mg q8h will    RENAL:  Baseline Cr 1.24, currently 1.4  Na goal >145  IVL  - trend lytes    GI:  s/p PEG 11/20  - Diet: TF @ 20 gradual increase to goal 80  - Bowel regimen     ENDO:   A1c 6.4%  - FS goal 120-180  - HISS      HEME/ONC:  LED negative 11/8, 11/15 left soilel DVT repeat LE doppler today to check for propagation   - SCDs  - Chemoppx: heparin SQ    ID:  cefepime end date 11/21 (7 days) for enterobacter PNA     ASSESSMENT: 60 yo man hx of HTN, DM, admitted 11/4 with HA/N/V followed by unresponsiveness, CT head with posterior fossa hemorrhage c/b severe IVH with casting of the 4th and 3rd ventricles with hydrocephalus, s/p EVD and SOC, 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 EVD clamped & removed       NEURO:  ICH score: 4  - neuro checks q4  - pain control  - modafinil 200mg no improvement in neurologic function will d/c tomorrow if continues to have no improvement   - plan for repeat angio at some point    PULM:  s/p trach   - CPAP trials   - vent bundle  - chest PT  - RCU following, plan for txer  - PNA management as below     CV:  Afib RVR on admission  - SBP goal 100-160  - labetalol 100mg q8h will    RENAL:  Baseline Cr 1.24, currently 1.4  Na goal >145  IVL  - trend lytes    GI:  s/p PEG 11/20  - Diet: TF @ 20 gradual increase to goal 80  - Bowel regimen     ENDO:   A1c 6.4%  - FS goal 120-180  - HISS      HEME/ONC:  LED negative 11/8, 11/15 left soilel DVT repeat LE doppler today to check for propagation   - SCDs  - Chemoppx: heparin SQ  - repeat LED 11/27    ID:  cefepime end date 11/21 (7 days) for enterobacter PNA     ASSESSMENT: 60 yo man hx of HTN, DM, admitted 11/4 with HA/N/V followed by unresponsiveness, CT head with posterior fossa hemorrhage c/b severe IVH with casting of the 4th and 3rd ventricles with hydrocephalus, s/p EVD and SOC, 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 EVD clamped & removed       NEURO:  ICH score: 4  - neuro checks q4  - pain control  - d/c modafinil 200mg  - plan for repeat angio at some point      PULM:  s/p trach   - CPAP trials   - vent bundle  - chest PT  - RCU following, plan for txer  - PNA management as below     CV:  Afib RVR on admission  - SBP goal 100-160  - labetalol 100mg q8h will    RENAL:  Baseline Cr 1.24, currently 1.4  Na goal >145  IVL  - trend lytes    GI:  s/p PEG 11/20  - Diet: TF   - Bowel regimen     ENDO:   A1c 6.4%  - FS goal 120-180  - HISS  - NPH 15q6h      HEME/ONC:  LED negative 11/8, 11/15 left soilel DVT repeat LE doppler today to check for propagation   - no SCDs d/t b/l DVTs  - Chemoppx: heparin SQ  - repeat LED 11/27    ID:  cefepime end date 11/21 (7 days) for enterobacter PNA

## 2021-11-21 NOTE — PROGRESS NOTE ADULT - ASSESSMENT
62 y/o male POD #2 for tracheostomy 2/2 respiratory failure. Pt is doing well, #8 shiley cuffed with surgicel x1 removed, stable on CPAP, with minimal serosanguinous oozing from stoma, no active bleed. sutures x4 and umbilical tie in place.

## 2021-11-21 NOTE — PROGRESS NOTE ADULT - SUBJECTIVE AND OBJECTIVE BOX
Patient seen and examined at bedside.    --Anticoagulation--  heparin   Injectable 5000 Unit(s) SubCutaneous every 8 hours    T(C): 37.7 (11-21-21 @ 20:00), Max: 38.2 (11-21-21 @ 11:00)  HR: 79 (11-21-21 @ 20:00) (79 - 90)  BP: --  RR: 16 (11-21-21 @ 20:00) (12 - 17)  SpO2: 98% (11-21-21 @ 20:00) (98% - 100%)  Wt(kg): --    Exam:  trached, MELVI, dysconjugate gaze, pupils 2mmNR b/l, +corneals, overbreathing, +cough/gag, RUE ext, LUE nothing, BLE TF

## 2021-11-21 NOTE — PROGRESS NOTE ADULT - SUBJECTIVE AND OBJECTIVE BOX
NSCU Progress Note    Assessment/Hospital Course:        24 Hour Events/Subjective:  -       REVIEW OF SYSTEMS:  - negative except as above    VITALS:   - Reviewed    IMAGING/DATA:   - Reviewed    ALLERGIES:   - penicillin (Other)        PHYSICAL EXAM:    General: calm  CVS: RRR  Pulm: CTAB  GI: Soft, NTND  Extremities: No LE Edema  Neuro: AOx3, PERRL, EOMI, facial symmetrical, fluent speech, motor 5/5 throughout, no PND, sensation in tact     NSCU Progress Note    Assessment/Hospital Course:        24 Hour Events/Subjective:  - s/p PEG placement  - LED significant for b/l BK DVT w/ propagation       REVIEW OF SYSTEMS:  - negative except as above    VITALS:   - Reviewed    IMAGING/DATA:   - Reviewed    ALLERGIES:   - penicillin (Other)        PHYSICAL EXAM:    General: trach to vent  CVS: RRR  Pulm: CTAB  GI: Soft, NTND  Extremities: No LE Edema  Neuro: trached, MELVI, dysconjugate gaze, pupils 2mmNR b/l, +corneals, overbreathing, +cough/gag, RUE ext, LUE nothing, BLE TF         NSCU Progress Note    Assessment/Hospital Course:        24 Hour Events/Subjective:  - s/p PEG placement  - LED significant for b/l BK DVT w/ propagation       VITALS:   - Reviewed    IMAGING/DATA:   - Reviewed    ALLERGIES:   - penicillin (Other)        PHYSICAL EXAM:    General: trach to vent  CVS: RRR  Pulm: CTAB  GI: Soft, NTND  Extremities: No LE Edema  Neuro: trached, MELVI, dysconjugate gaze, pupils 2mmNR b/l, +corneals, overbreathing, +cough/gag, RUE ext, LUE nothing, BLE TF

## 2021-11-21 NOTE — PROGRESS NOTE ADULT - SUBJECTIVE AND OBJECTIVE BOX
INTERVAL HPI/OVERNIGHT EVENTS:  no events    MEDICATIONS  (STANDING):  atorvastatin 40 milliGRAM(s) Oral at bedtime  cefepime   IVPB 2000 milliGRAM(s) IV Intermittent every 8 hours  chlorhexidine 0.12% Liquid 15 milliLiter(s) Oral Mucosa two times a day  chlorhexidine 4% Liquid 1 Application(s) Topical <User Schedule>  dextrose 50% Injectable 25 Gram(s) IV Push once  dextrose 50% Injectable 12.5 Gram(s) IV Push once  dextrose 50% Injectable 25 Gram(s) IV Push once  doxazosin 8 milliGRAM(s) Oral at bedtime  influenza   Vaccine 0.5 milliLiter(s) IntraMuscular once  insulin lispro (ADMELOG) corrective regimen sliding scale   SubCutaneous every 6 hours  insulin NPH human recombinant 13 Unit(s) SubCutaneous every 6 hours  labetalol 100 milliGRAM(s) Oral every 8 hours  modafinil 200 milliGRAM(s) Oral daily  pantoprazole  Injectable 40 milliGRAM(s) IV Push daily  polyethylene glycol 3350 17 Gram(s) Oral daily  senna 2 Tablet(s) Oral at bedtime    MEDICATIONS  (PRN):  acetaminophen    Suspension .. 650 milliGRAM(s) Oral every 6 hours PRN Temp greater or equal to 38C (100.4F), Mild Pain (1 - 3)  fentaNYL    Injectable 25 MICROGram(s) IV Push every 4 hours PRN Moderate Pain (4 - 6)      Allergies    penicillin (Other)    Intolerances    Review of Systems:  nonverbal        Vital Signs Last 24 Hrs  Vital Signs Last 24 Hrs  T(C): 38.2 (2021 11:00), Max: 38.2 (2021 11:00)  T(F): 100.8 (2021 11:00), Max: 100.8 (2021 11:00)  HR: 82 (2021 15:49) (73 - 90)  BP: 105/68 (2021 19:00) (105/68 - 105/68)  BP(mean): 80 (2021 19:00) (80 - 80)  RR: 14 (2021 11:00) (12 - 17)  SpO2: 100% (2021 15:49) (99% - 100%)  PHYSICAL EXAM:     GENERAL:  Appears stated age, well-groomed, well-nourished, no distress  HEENT:  NC/AT,  conjunctivae anicteric, clear and pink,   NECK: supple, trachea midline  CHEST:  Full & symmetric excursion, no increased effort, breath sounds clear  HEART:  Regular rhythm, no JVD  ABDOMEN:  Soft, non-tender, non-distended, normoactive bowel sounds,  no masses , no hepatosplenomegaly  EXTREMITIES:  no cyanosis, clubbing or edema  SKIN:  No rash, erythema, or, ecchymoses, no jaundice  NEURO:  non-focal, no asterixis  PSYCH calm, nonverbal  RECTAL: Deferred        LABS:                        8.0    9.17  )-----------( 280      ( 2021 07:01 )             25.1     -    138  |  104  |  40<H>  ----------------------------<  143<H>  4.3   |  25  |  1.24    Ca    7.6<L>      2021 00:04  Phos  3.5       Mg     2.2           PT/INR - ( 2021 00:04 )   PT: 12.6 sec;   INR: 1.05 ratio         PTT - ( 2021 00:04 )  PTT:29.3 sec  Urinalysis Basic - ( 2021 23:29 )    Color: Light Yellow / Appearance: Clear / S.014 / pH: x  Gluc: x / Ketone: Negative  / Bili: Negative / Urobili: Negative   Blood: x / Protein: Trace / Nitrite: Negative   Leuk Esterase: Negative / RBC: 5 /hpf / WBC 5 /HPF   Sq Epi: x / Non Sq Epi: 1 /hpf / Bacteria: Negative        RADIOLOGY & ADDITIONAL TESTS:

## 2021-11-22 ENCOUNTER — TRANSCRIPTION ENCOUNTER (OUTPATIENT)
Age: 61
End: 2021-11-22

## 2021-11-22 LAB
ANION GAP SERPL CALC-SCNC: 9 MMOL/L — SIGNIFICANT CHANGE UP (ref 5–17)
APTT BLD: 30.8 SEC — SIGNIFICANT CHANGE UP (ref 27.5–35.5)
BLD GP AB SCN SERPL QL: NEGATIVE — SIGNIFICANT CHANGE UP
BUN SERPL-MCNC: 31 MG/DL — HIGH (ref 7–23)
CALCIUM SERPL-MCNC: 8.2 MG/DL — LOW (ref 8.4–10.5)
CHLORIDE SERPL-SCNC: 103 MMOL/L — SIGNIFICANT CHANGE UP (ref 96–108)
CO2 SERPL-SCNC: 27 MMOL/L — SIGNIFICANT CHANGE UP (ref 22–31)
CREAT SERPL-MCNC: 1.06 MG/DL — SIGNIFICANT CHANGE UP (ref 0.5–1.3)
CULTURE RESULTS: SIGNIFICANT CHANGE UP
CULTURE RESULTS: SIGNIFICANT CHANGE UP
GLUCOSE SERPL-MCNC: 186 MG/DL — HIGH (ref 70–99)
HCT VFR BLD CALC: 24.2 % — LOW (ref 39–50)
HGB BLD-MCNC: 7.6 G/DL — LOW (ref 13–17)
INR BLD: 1.11 RATIO — SIGNIFICANT CHANGE UP (ref 0.88–1.16)
MAGNESIUM SERPL-MCNC: 2.2 MG/DL — SIGNIFICANT CHANGE UP (ref 1.6–2.6)
MCHC RBC-ENTMCNC: 30.3 PG — SIGNIFICANT CHANGE UP (ref 27–34)
MCHC RBC-ENTMCNC: 31.4 GM/DL — LOW (ref 32–36)
MCV RBC AUTO: 96.4 FL — SIGNIFICANT CHANGE UP (ref 80–100)
NRBC # BLD: 0 /100 WBCS — SIGNIFICANT CHANGE UP (ref 0–0)
PHOSPHATE SERPL-MCNC: 2.3 MG/DL — LOW (ref 2.5–4.5)
PLATELET # BLD AUTO: 432 K/UL — HIGH (ref 150–400)
POTASSIUM SERPL-MCNC: 4.5 MMOL/L — SIGNIFICANT CHANGE UP (ref 3.5–5.3)
POTASSIUM SERPL-SCNC: 4.5 MMOL/L — SIGNIFICANT CHANGE UP (ref 3.5–5.3)
PROTHROM AB SERPL-ACNC: 13.3 SEC — SIGNIFICANT CHANGE UP (ref 10.6–13.6)
RBC # BLD: 2.51 M/UL — LOW (ref 4.2–5.8)
RBC # FLD: 13.5 % — SIGNIFICANT CHANGE UP (ref 10.3–14.5)
RH IG SCN BLD-IMP: POSITIVE — SIGNIFICANT CHANGE UP
SARS-COV-2 RNA SPEC QL NAA+PROBE: SIGNIFICANT CHANGE UP
SODIUM SERPL-SCNC: 139 MMOL/L — SIGNIFICANT CHANGE UP (ref 135–145)
SPECIMEN SOURCE: SIGNIFICANT CHANGE UP
SPECIMEN SOURCE: SIGNIFICANT CHANGE UP
WBC # BLD: 9.57 K/UL — SIGNIFICANT CHANGE UP (ref 3.8–10.5)
WBC # FLD AUTO: 9.57 K/UL — SIGNIFICANT CHANGE UP (ref 3.8–10.5)

## 2021-11-22 PROCEDURE — 70450 CT HEAD/BRAIN W/O DYE: CPT | Mod: 26

## 2021-11-22 PROCEDURE — 99233 SBSQ HOSP IP/OBS HIGH 50: CPT

## 2021-11-22 PROCEDURE — 99231 SBSQ HOSP IP/OBS SF/LOW 25: CPT

## 2021-11-22 RX ORDER — SODIUM CHLORIDE 9 MG/ML
500 INJECTION INTRAMUSCULAR; INTRAVENOUS; SUBCUTANEOUS ONCE
Refills: 0 | Status: COMPLETED | OUTPATIENT
Start: 2021-11-22 | End: 2021-11-22

## 2021-11-22 RX ORDER — SENNA PLUS 8.6 MG/1
2 TABLET ORAL AT BEDTIME
Refills: 0 | Status: DISCONTINUED | OUTPATIENT
Start: 2021-11-22 | End: 2021-11-23

## 2021-11-22 RX ORDER — HUMAN INSULIN 100 [IU]/ML
9 INJECTION, SUSPENSION SUBCUTANEOUS EVERY 6 HOURS
Refills: 0 | Status: DISCONTINUED | OUTPATIENT
Start: 2021-11-22 | End: 2021-11-23

## 2021-11-22 RX ORDER — ATORVASTATIN CALCIUM 80 MG/1
40 TABLET, FILM COATED ORAL AT BEDTIME
Refills: 0 | Status: DISCONTINUED | OUTPATIENT
Start: 2021-11-22 | End: 2021-11-23

## 2021-11-22 RX ORDER — ACETAMINOPHEN 500 MG
650 TABLET ORAL EVERY 6 HOURS
Refills: 0 | Status: DISCONTINUED | OUTPATIENT
Start: 2021-11-22 | End: 2021-11-23

## 2021-11-22 RX ORDER — DOXAZOSIN MESYLATE 4 MG
8 TABLET ORAL AT BEDTIME
Refills: 0 | Status: DISCONTINUED | OUTPATIENT
Start: 2021-11-22 | End: 2021-11-23

## 2021-11-22 RX ORDER — POLYETHYLENE GLYCOL 3350 17 G/17G
17 POWDER, FOR SOLUTION ORAL DAILY
Refills: 0 | Status: DISCONTINUED | OUTPATIENT
Start: 2021-11-22 | End: 2021-11-23

## 2021-11-22 RX ADMIN — HUMAN INSULIN 9 UNIT(S): 100 INJECTION, SUSPENSION SUBCUTANEOUS at 11:41

## 2021-11-22 RX ADMIN — HEPARIN SODIUM 5000 UNIT(S): 5000 INJECTION INTRAVENOUS; SUBCUTANEOUS at 05:13

## 2021-11-22 RX ADMIN — Medication 1 DROP(S): at 05:13

## 2021-11-22 RX ADMIN — Medication 4: at 18:43

## 2021-11-22 RX ADMIN — HUMAN INSULIN 9 UNIT(S): 100 INJECTION, SUSPENSION SUBCUTANEOUS at 05:14

## 2021-11-22 RX ADMIN — Medication 2: at 23:58

## 2021-11-22 RX ADMIN — HEPARIN SODIUM 5000 UNIT(S): 5000 INJECTION INTRAVENOUS; SUBCUTANEOUS at 15:48

## 2021-11-22 RX ADMIN — Medication 2: at 11:41

## 2021-11-22 RX ADMIN — Medication 1 DROP(S): at 23:59

## 2021-11-22 RX ADMIN — Medication 8 MILLIGRAM(S): at 21:17

## 2021-11-22 RX ADMIN — HUMAN INSULIN 9 UNIT(S): 100 INJECTION, SUSPENSION SUBCUTANEOUS at 18:45

## 2021-11-22 RX ADMIN — CHLORHEXIDINE GLUCONATE 1 APPLICATION(S): 213 SOLUTION TOPICAL at 21:17

## 2021-11-22 RX ADMIN — Medication 1 DROP(S): at 18:42

## 2021-11-22 RX ADMIN — CHLORHEXIDINE GLUCONATE 15 MILLILITER(S): 213 SOLUTION TOPICAL at 18:43

## 2021-11-22 RX ADMIN — SODIUM CHLORIDE 1000 MILLILITER(S): 9 INJECTION INTRAMUSCULAR; INTRAVENOUS; SUBCUTANEOUS at 01:30

## 2021-11-22 RX ADMIN — CHLORHEXIDINE GLUCONATE 15 MILLILITER(S): 213 SOLUTION TOPICAL at 05:14

## 2021-11-22 RX ADMIN — ATORVASTATIN CALCIUM 40 MILLIGRAM(S): 80 TABLET, FILM COATED ORAL at 21:17

## 2021-11-22 RX ADMIN — HUMAN INSULIN 9 UNIT(S): 100 INJECTION, SUSPENSION SUBCUTANEOUS at 23:59

## 2021-11-22 RX ADMIN — Medication 1 DROP(S): at 11:48

## 2021-11-22 NOTE — PROGRESS NOTE ADULT - ASSESSMENT
62 y/o male POD #3 for tracheostomy 2/2 respiratory failure. Pt is doing well, #8 shiley cuffed with surgicel x1 removed, stable on CPAP, with minimal serosanguinous oozing from stoma, no active bleed. sutures x4 and umbilical tie in place.

## 2021-11-22 NOTE — CONSULT NOTE ADULT - ASSESSMENT
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 EVD clamped & removed and now planned for  Shunt tomorrow    -will reevaluate postop and can likely be tx to RCU the following day  - please ensure not on any continuous gtt prior to transfer  - d/c A-line prior to transfer  - plan to restart PS trials postop  - pt on dvt ppx    - wife at bedside and updated as to plan

## 2021-11-22 NOTE — PROGRESS NOTE ADULT - SUBJECTIVE AND OBJECTIVE BOX
ENT ISSUE/POD: POD #3 for trach    HPI: 62 y/o male POD #3 for tracheostomy #8 shiley cuffed 2/2 respiratory failure. Pt is doing well on CPAP this am No reported issues.        PAST MEDICAL & SURGICAL HISTORY:  HTN (hypertension)    Diabetes mellitus      Allergies    penicillin (Other)    Intolerances      MEDICATIONS  (STANDING):  artificial  tears Solution 1 Drop(s) Both EYES every 6 hours  atorvastatin 40 milliGRAM(s) Oral at bedtime  chlorhexidine 0.12% Liquid 15 milliLiter(s) Oral Mucosa two times a day  chlorhexidine 4% Liquid 1 Application(s) Topical <User Schedule>  dextrose 50% Injectable 25 Gram(s) IV Push once  dextrose 50% Injectable 12.5 Gram(s) IV Push once  dextrose 50% Injectable 25 Gram(s) IV Push once  doxazosin 8 milliGRAM(s) Oral at bedtime  heparin   Injectable 5000 Unit(s) SubCutaneous every 8 hours  insulin lispro (ADMELOG) corrective regimen sliding scale   SubCutaneous every 6 hours  insulin NPH human recombinant 9 Unit(s) SubCutaneous every 6 hours  polyethylene glycol 3350 17 Gram(s) Oral daily  senna 2 Tablet(s) Oral at bedtime    MEDICATIONS  (PRN):  acetaminophen    Suspension .. 650 milliGRAM(s) Oral every 6 hours PRN Temp greater or equal to 38C (100.4F), Mild Pain (1 - 3)          Vital Signs Last 24 Hrs  T(C): 37.9 (22 Nov 2021 00:00), Max: 38.2 (21 Nov 2021 11:00)  T(F): 100.3 (22 Nov 2021 00:00), Max: 100.8 (21 Nov 2021 11:00)  HR: 83 (22 Nov 2021 07:00) (79 - 95)  BP: --  BP(mean): --  RR: 16 (22 Nov 2021 07:00) (12 - 17)  SpO2: 100% (22 Nov 2021 07:00) (98% - 100%)                          7.3    10.18 )-----------( 388      ( 21 Nov 2021 22:18 )             22.9    11-21    139  |  106  |  33<H>  ----------------------------<  104<H>  4.1   |  26  |  1.17    Ca    8.1<L>      21 Nov 2021 22:18  Phos  3.2     11-21  Mg     2.4     11-21     PT/INR - ( 20 Nov 2021 21:46 )   PT: 13.9 sec;   INR: 1.17 ratio         PTT - ( 20 Nov 2021 21:46 )  PTT:30.8 sec    PHYSICAL EXAM:  Gen: NAD, well-developed  Head: Normocephalic, Atraumatic  Face: no edema/erythema/fluctuance  Eyes:  no scleral injection  Nose: Nares bilaterally patent, no discharge  Mouth: No Stridor / Drooling / Trismus.  Mucosa moist, tongue/uvula midline, oropharynx clear  Neck: #8 shiley inflated cuff and surgicel x1 removed, with minimal serosanguinous oozing from stoma, no active bleeding, sutures x4 and umbilical tie in place.  No lymphadenopathy, trachea midline, no masses  Resp: tolerating CPAP   CV: no peripheral edema/cyanosis

## 2021-11-22 NOTE — PROGRESS NOTE ADULT - SUBJECTIVE AND OBJECTIVE BOX
NSCU Progress Note    24 Hour Events/Subjective:  - s/p PEG placement  - LED significant for b/l BK DVT w/ propagation     IMAGING/DATA:   - Reviewed    ALLERGIES:   - penicillin (Other)    PHYSICAL EXAM:    General: trach to vent  CVS: RRR  Pulm: CTAB  GI: Soft, NTND  Extremities: No LE Edema  Neuro: trached, MELVI, dysconjugate gaze, pupils 2mmNR b/l, +corneals, overbreathing, +cough/gag, RUE ext, LUE nothing, BLE TF      ICU Vital Signs Last 24 Hrs  T(C): 37.9 (22 Nov 2021 00:00), Max: 38.2 (21 Nov 2021 11:00)  T(F): 100.3 (22 Nov 2021 00:00), Max: 100.8 (21 Nov 2021 11:00)  HR: 95 (22 Nov 2021 04:00) (79 - 95)  ABP: 138/60 (22 Nov 2021 04:00) (107/55 - 179/110)  ABP(mean): 83 (22 Nov 2021 04:00) (67 - 129)  RR: 14 (22 Nov 2021 04:00) (12 - 17)  SpO2: 100% (22 Nov 2021 04:00) (98% - 100%)      11-20-21 @ 07:01  -  11-21-21 @ 07:00  --------------------------------------------------------  IN: 1300 mL / OUT: 2550 mL / NET: -1250 mL    11-21-21 @ 07:01  -  11-22-21 @ 06:19  --------------------------------------------------------  IN: 2610 mL / OUT: 2250 mL / NET: 360 mL        Mode: AC/ CMV (Assist Control/ Continuous Mandatory Ventilation), RR (machine): 16, TV (machine): 450, FiO2: 30, PEEP: 5, ITime: 1, MAP: 14, PIP: 26    acetaminophen    Suspension .. 650 milliGRAM(s) Oral every 6 hours PRN  artificial  tears Solution 1 Drop(s) Both EYES every 6 hours  atorvastatin 40 milliGRAM(s) Oral at bedtime  chlorhexidine 0.12% Liquid 15 milliLiter(s) Oral Mucosa two times a day  chlorhexidine 4% Liquid 1 Application(s) Topical <User Schedule>  dextrose 50% Injectable 25 Gram(s) IV Push once  dextrose 50% Injectable 12.5 Gram(s) IV Push once  dextrose 50% Injectable 25 Gram(s) IV Push once  doxazosin 8 milliGRAM(s) Oral at bedtime  heparin   Injectable 5000 Unit(s) SubCutaneous every 8 hours  insulin lispro (ADMELOG) corrective regimen sliding scale   SubCutaneous every 6 hours  insulin NPH human recombinant 9 Unit(s) SubCutaneous every 6 hours  polyethylene glycol 3350 17 Gram(s) Oral daily  senna 2 Tablet(s) Oral at bedtime      LABS:  Na: 139 (11-21 @ 22:18), 135 (11-20 @ 21:46), 136 (11-19 @ 23:19)  K: 4.1 (11-21 @ 22:18), 4.0 (11-20 @ 21:46), 4.7 (11-19 @ 23:19)  Cl: 106 (11-21 @ 22:18), 101 (11-20 @ 21:46), 102 (11-19 @ 23:19)  CO2: 26 (11-21 @ 22:18), 25 (11-20 @ 21:46), 23 (11-19 @ 23:19)  BUN: 33 (11-21 @ 22:18), 34 (11-20 @ 21:46), 35 (11-19 @ 23:19)  Cr: 1.17 (11-21 @ 22:18), 1.10 (11-20 @ 21:46), 1.18 (11-19 @ 23:19)  Glu: 104(11-21 @ 22:18), 159(11-20 @ 21:46), 161(11-19 @ 23:19)    Hgb: 7.3 (11-21 @ 22:18), 7.8 (11-20 @ 21:46), 8.3 (11-19 @ 23:19), 8.0 (11-19 @ 07:01)  Hct: 22.9 (11-21 @ 22:18), 24.1 (11-20 @ 21:46), 25.1 (11-19 @ 23:19), 25.1 (11-19 @ 07:01)  WBC: 10.18 (11-21 @ 22:18), 10.72 (11-20 @ 21:46), 14.30 (11-19 @ 23:19), 9.17 (11-19 @ 07:01)  Plt: 388 (11-21 @ 22:18), 362 (11-20 @ 21:46), 316 (11-19 @ 23:19), 280 (11-19 @ 07:01)    INR: 1.17 11-20-21 @ 21:46  PTT: 30.8 11-20-21 @ 21:46      ABG - ( 20 Nov 2021 21:39 )  pH, Arterial: 7.41  pH, Blood: x     /  pCO2: 44    /  pO2: 143   / HCO3: 28    / Base Excess: 2.9   /  SaO2: 99.3

## 2021-11-22 NOTE — PHYSICAL THERAPY INITIAL EVALUATION ADULT - PERTINENT HX OF CURRENT PROBLEM, REHAB EVAL
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 EVD clamped & removed.

## 2021-11-22 NOTE — PROGRESS NOTE ADULT - SUBJECTIVE AND OBJECTIVE BOX
Patient seen and examined at bedside.    --Anticoagulation--  heparin   Injectable 5000 Unit(s) SubCutaneous every 8 hours    T(C): 37.9 (11-22-21 @ 00:00), Max: 38.2 (11-21-21 @ 11:00)  HR: 81 (11-22-21 @ 00:00) (79 - 90)  BP: --  RR: 12 (11-22-21 @ 00:00) (12 - 17)  SpO2: 100% (11-22-21 @ 00:00) (98% - 100%)  Wt(kg): --    Exam:    trached, MELVI, dysconjugate gaze, pupils 2mmNR b/l, +corneals, overbreathing, +cough/gag, RUE ext, LUE nothing, BLE TF

## 2021-11-22 NOTE — PROGRESS NOTE ADULT - SUBJECTIVE AND OBJECTIVE BOX
INTERVAL HPI/OVERNIGHT EVENTS:    pt seen and examined. no GI events  tolerating PEG feeds at 80 cc/hour    MEDICATIONS  (STANDING):  artificial  tears Solution 1 Drop(s) Both EYES every 6 hours  atorvastatin 40 milliGRAM(s) Oral at bedtime  chlorhexidine 0.12% Liquid 15 milliLiter(s) Oral Mucosa two times a day  chlorhexidine 4% Liquid 1 Application(s) Topical <User Schedule>  dextrose 50% Injectable 25 Gram(s) IV Push once  dextrose 50% Injectable 12.5 Gram(s) IV Push once  dextrose 50% Injectable 25 Gram(s) IV Push once  doxazosin 8 milliGRAM(s) Oral at bedtime  heparin   Injectable 5000 Unit(s) SubCutaneous every 8 hours  insulin lispro (ADMELOG) corrective regimen sliding scale   SubCutaneous every 6 hours  insulin NPH human recombinant 9 Unit(s) SubCutaneous every 6 hours  polyethylene glycol 3350 17 Gram(s) Oral daily  senna 2 Tablet(s) Oral at bedtime    MEDICATIONS  (PRN):  acetaminophen    Suspension .. 650 milliGRAM(s) Oral every 6 hours PRN Temp greater or equal to 38.5C (101.3F), Mild Pain (1 - 3)      Allergies    penicillin (Other)    Intolerances      Review of Systems:  nonverbal    Vital Signs Last 24 Hrs  T(C): 36.3 (22 Nov 2021 11:00), Max: 37.9 (22 Nov 2021 00:00)  T(F): 97.4 (22 Nov 2021 11:00), Max: 100.3 (22 Nov 2021 00:00)  HR: 85 (22 Nov 2021 11:00) (79 - 95)  BP: --  BP(mean): --  RR: 16 (22 Nov 2021 11:00) (12 - 17)  SpO2: 100% (22 Nov 2021 11:00) (98% - 100%)    PHYSICAL EXAM:    GENERAL:  Appears stated age, well-groomed, well-nourished, no distress  HEENT:  NC/AT,  conjunctivae anicteric, clear and pink,   NECK: supple, trachea midline  CHEST:  Full & symmetric excursion, no increased effort, breath sounds clear  HEART:  Regular rhythm, no JVD  ABDOMEN:  Soft, non-tender, non-distended, normoactive bowel sounds,  no masses , no hepatosplenomegaly  EXTREMITIES:  no cyanosis, clubbing or edema  SKIN:  No rash, erythema, or, ecchymoses, no jaundice  NEURO:  non-focal, no asterixis  PSYCH calm, nonverbal  RECTAL: Deferred      LABS:                        7.3    10.18 )-----------( 388      ( 21 Nov 2021 22:18 )             22.9     11-21    139  |  106  |  33<H>  ----------------------------<  104<H>  4.1   |  26  |  1.17    Ca    8.1<L>      21 Nov 2021 22:18  Phos  3.2     11-21  Mg     2.4     11-21      PT/INR - ( 20 Nov 2021 21:46 )   PT: 13.9 sec;   INR: 1.17 ratio         PTT - ( 20 Nov 2021 21:46 )  PTT:30.8 sec      RADIOLOGY & ADDITIONAL TESTS:

## 2021-11-22 NOTE — CONSULT NOTE ADULT - SUBJECTIVE AND OBJECTIVE BOX
CHIEF COMPLAINT:    HPI:  61M hx of HTN, DM, who was BIBEMS as a code stroke w/ LKW ~0830 am when he c/o HA with n/v, EMS activated found patient to be incoherent, hypertensive 220s and bradycardic 50s and ,eventually unresponsive in ED requiring intubation for GCS/Airway protection, CTH with large posterior fossa bleed w/ IVH, CTA w/ no LVO, large hyperdense focus in 4th vent suggesting active bleed vs. aneurysm, in ED with poor exam, EVD placed @15 and admitted to NSICU for further management.    Course notable for SOC and aneurysm resection. Pt s/p trach/peg  Planned for VPS tomorrow    Currently occ spont breaths on prvc, prior PS trials tolerated       PAST MEDICAL & SURGICAL HISTORY:  HTN (hypertension)    Diabetes mellitus    Allergies    penicillin (Other)    Intolerances        REVIEW OF SYSTEMS:  Constitutional: [ ] negative [ ] fevers [ ] chills [ ] weight loss [ ] weight gain  HEENT: [ ] negative [ ] dry eyes [ ] eye irritation [ ] postnasal drip [ ] nasal congestion  CV: [ ] negative  [ ] chest pain [ ] orthopnea [ ] palpitations [ ] murmur  Resp: [ ] negative [ ] cough [ ] shortness of breath [ ] dyspnea [ ] wheezing [ ] sputum [ ] hemoptysis  GI: [ ] negative [ ] nausea [ ] vomiting [ ] diarrhea [ ] constipation [ ] abd pain [ ] dysphagia   : [ ] negative [ ] dysuria [ ] nocturia [ ] hematuria [ ] increased urinary frequency  Musculoskeletal: [ ] negative [ ] back pain [ ] myalgias [ ] arthralgias [ ] fracture  Skin: [ ] negative [ ] rash [ ] itch  Neurological: [ ] negative [ ] headache [ ] dizziness [ ] syncope [ ] weakness [ ] numbness  Psychiatric: [ ] negative [ ] anxiety [ ] depression  Endocrine: [ ] negative [ ] diabetes [ ] thyroid problem  Hematologic/Lymphatic: [ ] negative [ ] anemia [ ] bleeding problem  Allergic/Immunologic: [ ] negative [ ] itchy eyes [ ] nasal discharge [ ] hives [ ] angioedema  [ ] All other systems negative  [x ] Unable to assess ROS because _mental status ____    OBJECTIVE:  ICU Vital Signs Last 24 Hrs  T(C): 36.3 (22 Nov 2021 11:00), Max: 37.9 (22 Nov 2021 00:00)  T(F): 97.4 (22 Nov 2021 11:00), Max: 100.3 (22 Nov 2021 00:00)  HR: 85 (22 Nov 2021 11:00) (79 - 95)  BP: --  BP(mean): --  ABP: 134/64 (22 Nov 2021 11:00) (107/55 - 138/60)  ABP(mean): 87 (22 Nov 2021 11:00) (67 - 87)  RR: 16 (22 Nov 2021 11:00) (12 - 17)  SpO2: 100% (22 Nov 2021 11:00) (98% - 100%)    Mode: AC/ CMV (Assist Control/ Continuous Mandatory Ventilation), RR (machine): 16, TV (machine): 450, FiO2: 30, PEEP: 5, ITime: 0.9, MAP: 10, PIP: 18    11-21 @ 07:01 - 11-22 @ 07:00  --------------------------------------------------------  IN: 2610 mL / OUT: 2250 mL / NET: 360 mL    11-22 @ 07:01 - 11-22 @ 14:48  --------------------------------------------------------  IN: 320 mL / OUT: 700 mL / NET: -380 mL         POCT Blood Glucose.: 183 mg/dL (22 Nov 2021 11:09)      PHYSICAL EXAM:  General: NAD  HEENT: scooby  Neck: +trach  Respiratory: b/l cta  Cardiovascular: s1s2 reg no murmur  Abdomen: +BS soft nontender  Extremities: no edema, L radial art line  Neurological: slight movement to stimuli off all ext, not opening eyes          HOSPITAL MEDICATIONS:  Standing Meds:  artificial  tears Solution 1 Drop(s) Both EYES every 6 hours  atorvastatin 40 milliGRAM(s) Oral at bedtime  chlorhexidine 0.12% Liquid 15 milliLiter(s) Oral Mucosa two times a day  chlorhexidine 4% Liquid 1 Application(s) Topical <User Schedule>  dextrose 50% Injectable 25 Gram(s) IV Push once  dextrose 50% Injectable 12.5 Gram(s) IV Push once  dextrose 50% Injectable 25 Gram(s) IV Push once  doxazosin 8 milliGRAM(s) Oral at bedtime  heparin   Injectable 5000 Unit(s) SubCutaneous every 8 hours  insulin lispro (ADMELOG) corrective regimen sliding scale   SubCutaneous every 6 hours  insulin NPH human recombinant 9 Unit(s) SubCutaneous every 6 hours  polyethylene glycol 3350 17 Gram(s) Oral daily  senna 2 Tablet(s) Oral at bedtime      PRN Meds:  acetaminophen    Suspension .. 650 milliGRAM(s) Oral every 6 hours PRN      LABS:                        7.3    10.18 )-----------( 388      ( 21 Nov 2021 22:18 )             22.9     Hgb Trend: 7.3<--, 7.8<--, 8.3<--, 8.0<--, 7.0<--  11-21    139  |  106  |  33<H>  ----------------------------<  104<H>  4.1   |  26  |  1.17    Ca    8.1<L>      21 Nov 2021 22:18  Phos  3.2     11-21  Mg     2.4     11-21      Creatinine Trend: 1.17<--, 1.10<--, 1.18<--, 1.24<--, 1.37<--, 1.17<--  PT/INR - ( 20 Nov 2021 21:46 )   PT: 13.9 sec;   INR: 1.17 ratio         PTT - ( 20 Nov 2021 21:46 )  PTT:30.8 sec    Arterial Blood Gas:  11-20 @ 21:39  7.41/44/143/28/99.3/2.9  ABG lactate: --     < from: Xray Chest 1 View- PORTABLE-Routine (Xray Chest 1 View- PORTABLE-Routine in AM.) (11.20.21 @ 08:44) >  EXAM:  XR CHEST PORTABLE ROUTINE 1V                            PROCEDURE DATE:  11/20/2021            INTERPRETATION:  EXAMINATION: XR CHEST    CLINICAL INDICATION: s/p trach    TECHNIQUE: Single frontal, portable view of the chest was obtained.    COMPARISON: Chest x-ray 11/19/2021.    FINDINGS:  Tracheostomy tube tip is in the mid trachea.  The heart is normal in size.  The lungs are clear.  There is no pneumothorax or pleural effusion.    IMPRESSION:  Tracheostomy tube tip in the midtrachea.    Clear lungs.    --- End of Report ---              MICHELET PAN MD; Resident Radiologist  This document has been electronically signed.  AUSTEN MENDOZA MD; Attending Interventional Radiologist  This document has been electronically signed. Nov 21 2021  9:29AM    < end of copied text >

## 2021-11-22 NOTE — PHYSICAL THERAPY INITIAL EVALUATION ADULT - GENERAL OBSERVATIONS, REHAB EVAL
Rec'd semi-supine in bed, in NAD, +IV, +a-line, +trach to vent, +PEG, +NSCU monitoring, wife at bedside. Pt cleared for bedside assessment. Pt nonverbal and not following commands.

## 2021-11-22 NOTE — PROGRESS NOTE ADULT - SUBJECTIVE AND OBJECTIVE BOX
Nighttime rounds performed  pre op for shunt tomorrow  refer to earlier note for detail  unchanged exam  -160, d/c labetalol, continue cardura  in sinus rhythm  glucerna for tube feeding NPO after midnight, continue NPH  RCU consult

## 2021-11-22 NOTE — CONSULT NOTE ADULT - ASSESSMENT
HPI: 61M pmhx HTN, DM at work complaining of HA ~8:30, starting feeling dizzy and vomiting, HTN/keke when EMS got to him. SBP 220s, HR 50s. CTH shows large posterior fossa bleed w/ IVH/SAH/hydro. Now s/p trach and PEG. Surgery consulted for  shunt placement on 11/23 for hydrocephalus.        PAST MEDICAL & SURGICAL HISTORY:  HTN (hypertension)    Diabetes mellitus        MEDICATIONS  (STANDING):  artificial  tears Solution 1 Drop(s) Both EYES every 6 hours  atorvastatin 40 milliGRAM(s) Oral at bedtime  chlorhexidine 0.12% Liquid 15 milliLiter(s) Oral Mucosa two times a day  chlorhexidine 4% Liquid 1 Application(s) Topical <User Schedule>  dextrose 50% Injectable 25 Gram(s) IV Push once  dextrose 50% Injectable 12.5 Gram(s) IV Push once  dextrose 50% Injectable 25 Gram(s) IV Push once  doxazosin 8 milliGRAM(s) Oral at bedtime  insulin lispro (ADMELOG) corrective regimen sliding scale   SubCutaneous every 6 hours  insulin NPH human recombinant 9 Unit(s) SubCutaneous every 6 hours  polyethylene glycol 3350 17 Gram(s) Oral daily  senna 2 Tablet(s) Oral at bedtime    MEDICATIONS  (PRN):  acetaminophen    Suspension .. 650 milliGRAM(s) Oral every 6 hours PRN Temp greater or equal to 38.5C (101.3F), Mild Pain (1 - 3)      Allergies    penicillin (Other)    Intolerances        SOCIAL HISTORY:    FAMILY HISTORY:      PHYSICAL EXAM:  NECK: trach in place  HEART: Regular rate and rhythm  RESPIRATORY: nonlabored breathing  ABDOMEN: Soft, Nontender, PEG in place        Vital Signs Last 24 Hrs  T(C): 38 (22 Nov 2021 19:00), Max: 38 (22 Nov 2021 19:00)  T(F): 100.4 (22 Nov 2021 19:00), Max: 100.4 (22 Nov 2021 19:00)  HR: 93 (22 Nov 2021 19:00) (80 - 95)  BP: --  BP(mean): --  RR: 27 (22 Nov 2021 19:00) (12 - 27)  SpO2: 100% (22 Nov 2021 19:00) (99% - 100%)    I&O's Summary    21 Nov 2021 07:01  -  22 Nov 2021 07:00  --------------------------------------------------------  IN: 2610 mL / OUT: 2250 mL / NET: 360 mL    22 Nov 2021 07:01  -  22 Nov 2021 20:33  --------------------------------------------------------  IN: 880 mL / OUT: 1400 mL / NET: -520 mL            LABS:                        7.3    10.18 )-----------( 388      ( 21 Nov 2021 22:18 )             22.9     11-21    139  |  106  |  33<H>  ----------------------------<  104<H>  4.1   |  26  |  1.17    Ca    8.1<L>      21 Nov 2021 22:18  Phos  3.2     11-21  Mg     2.4     11-21      PT/INR - ( 22 Nov 2021 18:00 )   PT: 13.3 sec;   INR: 1.11 ratio         PTT - ( 22 Nov 2021 18:00 )  PTT:30.8 sec    CAPILLARY BLOOD GLUCOSE      POCT Blood Glucose.: 235 mg/dL (22 Nov 2021 16:59)  POCT Blood Glucose.: 183 mg/dL (22 Nov 2021 11:09)  POCT Blood Glucose.: 132 mg/dL (22 Nov 2021 05:05)  POCT Blood Glucose.: 81 mg/dL (21 Nov 2021 23:02)        Cultures:      RADIOLOGY & ADDITIONAL STUDIES:      Plan:    - plan for  shunt tomorrow, add on  - please call [facility] surgery in AM for confirmation of case add on time  - excellent care per NSICU    [facility], [phone]

## 2021-11-22 NOTE — PROGRESS NOTE ADULT - ASSESSMENT
61 year old man with respiratory failure d/t ICH    1. ICH  -per neurosurgery, s/p EVD    2. Respiratory failure  -for tracheostomy, will require long term enteral nutrition  -s/p PEG, tolerating feeds  - aspiration precautions     3. Enterobacter PNA  -s/p course of antibiotics     4. afib with RVR    5. Anemia, no overt GI bleed. EGD unremarkable.  -trend CBC    The plan of care was discussed with the physician assistant and modifications were made to the notation where appropriate.   Differential diagnosis and plan of care discussed with patient after the evaluation  35 minutes spent on total encounter of which more than fifty percent of the encounter was spent counseling and/or coordinating care by the attending physician.    [facility]  Gastroenterology and Hepatology  [address] [place], [place]  Office: [phone]  Cell: [phone]

## 2021-11-22 NOTE — PROGRESS NOTE ADULT - ASSESSMENT
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 EVD clamped & removed       NEURO:  ICH score: 4  - neuro checks q4  - pain control  - d/c modafinil 200mg  - plan for repeat angio at some point      PULM:  s/p trach   - CPAP trials   - vent bundle  - chest PT  - RCU following, plan for txer  - PNA management as below     CV:  Afib RVR on admission  - SBP goal 100-160  - labetalol 100mg q8h will    RENAL:  Baseline Cr 1.24, currently 1.4  Na goal >145  IVL  - trend lytes    GI:  s/p PEG 11/20  - Diet: TF   - Bowel regimen     ENDO:   A1c 6.4%  - FS goal 120-180  - HISS  - NPH 15q6h      HEME/ONC:  LED negative 11/8, 11/15 left soilel DVT repeat LE doppler today to check for propagation   - no SCDs d/t b/l DVTs  - Chemoppx: heparin SQ  - repeat LED 11/27    ID:  cefepime end date 11/21 (7 days) for enterobacter PNA     ASSESSMENT: 60 yo man hx of HTN, DM, admitted 11/4 with HA/N/V followed by unresponsiveness, CT head with posterior fossa hemorrhage c/b severe IVH with casting of the 4th and 3rd ventricles with hydrocephalus, s/p EVD and SOC, 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 EVD clamped & removed       NEURO:  ICH score: 4  - neuro checks q4  - pain control  - plan for repeat angio at some point      PULM:  s/p trach   - CPAP trials   - vent bundle  - chest PT  - RCU following, plan for txer  - PNA management as below       CV:  Afib RVR on admission  - SBP goal 100-160    RENAL:  Baseline Cr 1.24, currently 1.4  Na goal >145  IVL  - trend lytes    GI:  s/p PEG 11/20  - Diet: TF   - Bowel regimen   -LBM 11/21    ENDO:   A1c 6.4%  - FS goal 120-180  - HISS  - NPH 9q6h      HEME/ONC:  LED negative 11/8, 11/15 left soilel DVT repeat LE doppler today to check for propagation   - no SCDs d/t b/l DVTs  - Chemoppx: heparin SQ  - repeat LED 11/26    ID:  cefepime end date 11/21 (7 days) for enterobacter PNA

## 2021-11-22 NOTE — PHYSICAL THERAPY INITIAL EVALUATION ADULT - ADDITIONAL COMMENTS
Per wife at bedside, pt lives in house with wife, dtr, and granddaughter. Has 1 step to enter and 1 flight inside. Pt independent with all functional mobility without AD. +works, +drives, R handed, +glasses.

## 2021-11-23 LAB
ANION GAP SERPL CALC-SCNC: 11 MMOL/L — SIGNIFICANT CHANGE UP (ref 5–17)
BUN SERPL-MCNC: 27 MG/DL — HIGH (ref 7–23)
CALCIUM SERPL-MCNC: 8.3 MG/DL — LOW (ref 8.4–10.5)
CHLORIDE SERPL-SCNC: 104 MMOL/L — SIGNIFICANT CHANGE UP (ref 96–108)
CO2 SERPL-SCNC: 26 MMOL/L — SIGNIFICANT CHANGE UP (ref 22–31)
CREAT SERPL-MCNC: 0.94 MG/DL — SIGNIFICANT CHANGE UP (ref 0.5–1.3)
GLUCOSE SERPL-MCNC: 181 MG/DL — HIGH (ref 70–99)
HCT VFR BLD CALC: 25.3 % — LOW (ref 39–50)
HGB BLD-MCNC: 7.8 G/DL — LOW (ref 13–17)
MAGNESIUM SERPL-MCNC: 2 MG/DL — SIGNIFICANT CHANGE UP (ref 1.6–2.6)
MCHC RBC-ENTMCNC: 29.7 PG — SIGNIFICANT CHANGE UP (ref 27–34)
MCHC RBC-ENTMCNC: 30.8 GM/DL — LOW (ref 32–36)
MCV RBC AUTO: 96.2 FL — SIGNIFICANT CHANGE UP (ref 80–100)
NRBC # BLD: 0 /100 WBCS — SIGNIFICANT CHANGE UP (ref 0–0)
PHOSPHATE SERPL-MCNC: 3.2 MG/DL — SIGNIFICANT CHANGE UP (ref 2.5–4.5)
PLATELET # BLD AUTO: 456 K/UL — HIGH (ref 150–400)
POTASSIUM SERPL-MCNC: 4.6 MMOL/L — SIGNIFICANT CHANGE UP (ref 3.5–5.3)
POTASSIUM SERPL-SCNC: 4.6 MMOL/L — SIGNIFICANT CHANGE UP (ref 3.5–5.3)
RBC # BLD: 2.63 M/UL — LOW (ref 4.2–5.8)
RBC # FLD: 13.4 % — SIGNIFICANT CHANGE UP (ref 10.3–14.5)
SODIUM SERPL-SCNC: 141 MMOL/L — SIGNIFICANT CHANGE UP (ref 135–145)
WBC # BLD: 9.41 K/UL — SIGNIFICANT CHANGE UP (ref 3.8–10.5)
WBC # FLD AUTO: 9.41 K/UL — SIGNIFICANT CHANGE UP (ref 3.8–10.5)

## 2021-11-23 PROCEDURE — 49320 DIAG LAPARO SEPARATE PROC: CPT

## 2021-11-23 PROCEDURE — 61781 SCAN PROC CRANIAL INTRA: CPT

## 2021-11-23 PROCEDURE — 70450 CT HEAD/BRAIN W/O DYE: CPT | Mod: 26,77

## 2021-11-23 PROCEDURE — 62223 ESTABLISH BRAIN CAVITY SHUNT: CPT | Mod: 62

## 2021-11-23 PROCEDURE — 99291 CRITICAL CARE FIRST HOUR: CPT

## 2021-11-23 PROCEDURE — 70450 CT HEAD/BRAIN W/O DYE: CPT | Mod: 26

## 2021-11-23 PROCEDURE — 99231 SBSQ HOSP IP/OBS SF/LOW 25: CPT

## 2021-11-23 RX ORDER — FENTANYL CITRATE 50 UG/ML
25 INJECTION INTRAVENOUS ONCE
Refills: 0 | Status: DISCONTINUED | OUTPATIENT
Start: 2021-11-23 | End: 2021-11-23

## 2021-11-23 RX ORDER — DEXTROSE 50 % IN WATER 50 %
25 SYRINGE (ML) INTRAVENOUS ONCE
Refills: 0 | Status: DISCONTINUED | OUTPATIENT
Start: 2021-11-23 | End: 2022-01-26

## 2021-11-23 RX ORDER — HYDRALAZINE HCL 50 MG
10 TABLET ORAL ONCE
Refills: 0 | Status: COMPLETED | OUTPATIENT
Start: 2021-11-23 | End: 2021-11-23

## 2021-11-23 RX ORDER — DEXTROSE 50 % IN WATER 50 %
12.5 SYRINGE (ML) INTRAVENOUS ONCE
Refills: 0 | Status: DISCONTINUED | OUTPATIENT
Start: 2021-11-23 | End: 2022-01-26

## 2021-11-23 RX ORDER — HUMAN INSULIN 100 [IU]/ML
9 INJECTION, SUSPENSION SUBCUTANEOUS EVERY 6 HOURS
Refills: 0 | Status: DISCONTINUED | OUTPATIENT
Start: 2021-11-23 | End: 2021-11-28

## 2021-11-23 RX ORDER — CHLORHEXIDINE GLUCONATE 213 G/1000ML
15 SOLUTION TOPICAL
Refills: 0 | Status: DISCONTINUED | OUTPATIENT
Start: 2021-11-23 | End: 2022-01-01

## 2021-11-23 RX ORDER — HYDROMORPHONE HYDROCHLORIDE 2 MG/ML
0.5 INJECTION INTRAMUSCULAR; INTRAVENOUS; SUBCUTANEOUS ONCE
Refills: 0 | Status: DISCONTINUED | OUTPATIENT
Start: 2021-11-23 | End: 2021-11-23

## 2021-11-23 RX ORDER — DOXAZOSIN MESYLATE 4 MG
8 TABLET ORAL AT BEDTIME
Refills: 0 | Status: DISCONTINUED | OUTPATIENT
Start: 2021-11-23 | End: 2021-11-25

## 2021-11-23 RX ORDER — SODIUM CHLORIDE 9 MG/ML
500 INJECTION INTRAMUSCULAR; INTRAVENOUS; SUBCUTANEOUS ONCE
Refills: 0 | Status: DISCONTINUED | OUTPATIENT
Start: 2021-11-23 | End: 2021-11-23

## 2021-11-23 RX ORDER — ACETAMINOPHEN 500 MG
650 TABLET ORAL EVERY 6 HOURS
Refills: 0 | Status: DISCONTINUED | OUTPATIENT
Start: 2021-11-23 | End: 2022-01-01

## 2021-11-23 RX ORDER — SODIUM CHLORIDE 9 MG/ML
1000 INJECTION INTRAMUSCULAR; INTRAVENOUS; SUBCUTANEOUS
Refills: 0 | Status: DISCONTINUED | OUTPATIENT
Start: 2021-11-23 | End: 2021-11-23

## 2021-11-23 RX ORDER — INSULIN LISPRO 100/ML
VIAL (ML) SUBCUTANEOUS EVERY 6 HOURS
Refills: 0 | Status: DISCONTINUED | OUTPATIENT
Start: 2021-11-23 | End: 2022-01-26

## 2021-11-23 RX ORDER — LABETALOL HCL 100 MG
100 TABLET ORAL EVERY 8 HOURS
Refills: 0 | Status: DISCONTINUED | OUTPATIENT
Start: 2021-11-23 | End: 2021-11-27

## 2021-11-23 RX ORDER — CEFAZOLIN SODIUM 1 G
2000 VIAL (EA) INJECTION EVERY 8 HOURS
Refills: 0 | Status: COMPLETED | OUTPATIENT
Start: 2021-11-23 | End: 2021-11-24

## 2021-11-23 RX ORDER — LABETALOL HCL 100 MG
10 TABLET ORAL ONCE
Refills: 0 | Status: COMPLETED | OUTPATIENT
Start: 2021-11-23 | End: 2021-11-23

## 2021-11-23 RX ORDER — ATORVASTATIN CALCIUM 80 MG/1
40 TABLET, FILM COATED ORAL AT BEDTIME
Refills: 0 | Status: DISCONTINUED | OUTPATIENT
Start: 2021-11-23 | End: 2022-01-01

## 2021-11-23 RX ADMIN — FENTANYL CITRATE 25 MICROGRAM(S): 50 INJECTION INTRAVENOUS at 22:09

## 2021-11-23 RX ADMIN — Medication 8 MILLIGRAM(S): at 22:10

## 2021-11-23 RX ADMIN — Medication 100 MILLIGRAM(S): at 22:10

## 2021-11-23 RX ADMIN — Medication 62.5 MILLIMOLE(S): at 00:16

## 2021-11-23 RX ADMIN — Medication 10 MILLIGRAM(S): at 21:00

## 2021-11-23 RX ADMIN — CHLORHEXIDINE GLUCONATE 15 MILLILITER(S): 213 SOLUTION TOPICAL at 05:02

## 2021-11-23 RX ADMIN — Medication 1 DROP(S): at 20:28

## 2021-11-23 RX ADMIN — CHLORHEXIDINE GLUCONATE 15 MILLILITER(S): 213 SOLUTION TOPICAL at 20:06

## 2021-11-23 RX ADMIN — FENTANYL CITRATE 25 MICROGRAM(S): 50 INJECTION INTRAVENOUS at 21:33

## 2021-11-23 RX ADMIN — ATORVASTATIN CALCIUM 40 MILLIGRAM(S): 80 TABLET, FILM COATED ORAL at 22:10

## 2021-11-23 RX ADMIN — Medication 2: at 20:31

## 2021-11-23 RX ADMIN — HYDROMORPHONE HYDROCHLORIDE 0.5 MILLIGRAM(S): 2 INJECTION INTRAMUSCULAR; INTRAVENOUS; SUBCUTANEOUS at 20:43

## 2021-11-23 RX ADMIN — Medication 1 DROP(S): at 05:01

## 2021-11-23 RX ADMIN — FENTANYL CITRATE 25 MICROGRAM(S): 50 INJECTION INTRAVENOUS at 21:48

## 2021-11-23 RX ADMIN — Medication 10 MILLIGRAM(S): at 19:47

## 2021-11-23 RX ADMIN — Medication 1 DROP(S): at 12:05

## 2021-11-23 RX ADMIN — SODIUM CHLORIDE 75 MILLILITER(S): 9 INJECTION INTRAMUSCULAR; INTRAVENOUS; SUBCUTANEOUS at 05:01

## 2021-11-23 RX ADMIN — FENTANYL CITRATE 25 MICROGRAM(S): 50 INJECTION INTRAVENOUS at 22:34

## 2021-11-23 RX ADMIN — HYDROMORPHONE HYDROCHLORIDE 0.5 MILLIGRAM(S): 2 INJECTION INTRAMUSCULAR; INTRAVENOUS; SUBCUTANEOUS at 20:28

## 2021-11-23 NOTE — BRIEF OPERATIVE NOTE - NSICDXBRIEFPREOP_GEN_ALL_CORE_FT
PRE-OP DIAGNOSIS:  Hydrocephalus 23-Nov-2021 18:58:40  Wendy Kelley  
PRE-OP DIAGNOSIS:  Cerebellar hemorrhage 04-Nov-2021 20:48:11  Jesus Worthy  
PRE-OP DIAGNOSIS:  Cerebellar hemorrhage 04-Nov-2021 20:48:11  Jesus Worthy  Acute respiratory failure with hypoxia 19-Nov-2021 17:39:16  Deepak Wallace  
PRE-OP DIAGNOSIS:  Cerebellar hemorrhage 04-Nov-2021 20:48:11  Jesus Worthy  
PRE-OP DIAGNOSIS:  Acute respiratory failure with hypoxia 19-Nov-2021 17:39:16  Deepak Wallace

## 2021-11-23 NOTE — PROGRESS NOTE ADULT - ASSESSMENT
61 year old man with respiratory failure d/t ICH    1. ICH  -per neurosurgery, s/p EVD  -for  shunt    2. Respiratory failure  -for tracheostomy, will require long term enteral nutrition  -s/p PEG, tolerating feeds  - aspiration precautions     3. Enterobacter PNA  -s/p course of antibiotics     4. afib with RVR    5. Anemia, no overt GI bleed. EGD unremarkable.  -trend CBC    The plan of care was discussed with the physician assistant and modifications were made to the notation where appropriate.   Differential diagnosis and plan of care discussed with patient after the evaluation  35 minutes spent on total encounter of which more than fifty percent of the encounter was spent counseling and/or coordinating care by the attending physician.    [facility]  Gastroenterology and Hepatology  [address] [place], [place]  Office: [phone]  Cell: [phone]

## 2021-11-23 NOTE — PROGRESS NOTE ADULT - SUBJECTIVE AND OBJECTIVE BOX
Nighttime rounds performed  s/p VPS Certas at 4, post op CTH stable, smaller vents, consider repeat CTH   refer to earlier note for detail  unchanged exam  -160, hypertensive post op, now improved, continue cardura, labetalol  in sinus rhythm  glucerna for tube feeding NPO after midnight, continue NPH  wean to trach collar as tolerated, if continues to be stable on trach collar, may be transfer to floor  Nighttime rounds performed  s/p VPS Certas at 4, post op CTH stable, smaller vents, consider repeat CTH   refer to earlier note for detail  LUE HG spont ?reflex, RUE trace finger movement, RLE wiggles toes but not to command, LLE TF  -160, hypertensive post op, now improved, continue cardura, labetalol  in sinus rhythm  glucerna for tube feeding NPO after midnight, continue NPH  wean to trach collar as tolerated, if continues to be stable on trach collar, may be transfer to floor

## 2021-11-23 NOTE — PRE-ANESTHESIA EVALUATION ADULT - NSANTHPMHFT_GEN_ALL_CORE
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
resp failure s/p cva s/p trache
61M HTN HLD CAD s/p Stent (1.5 yr ago per Wife) for Craniotomy, clipping of AVM

## 2021-11-23 NOTE — PROGRESS NOTE ADULT - ASSESSMENT
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 EVD clamped & removed       NEURO:  ICH score: 4  - neuro checks q4  - pain control  - plan for repeat angio at some point      PULM:  s/p trach   - CPAP trials   - vent bundle  - chest PT  - RCU following, plan for txer  - PNA management as below       CV:  Afib RVR on admission  - SBP goal 100-160    RENAL:  Baseline Cr 1.24, currently 1.4  Na goal >145  IVL  - trend lytes    GI:  s/p PEG 11/20  - Diet: TF   - Bowel regimen   -LBM 11/21    ENDO:   A1c 6.4%  - FS goal 120-180  - HISS  - NPH 9q6h      HEME/ONC:  LED negative 11/8, 11/15 left soilel DVT repeat LE doppler today to check for propagation   - no SCDs d/t b/l DVTs  - Chemoppx: heparin SQ  - repeat LED 11/26    ID:  cefepime end date 11/21 (7 days) for enterobacter PNA

## 2021-11-23 NOTE — PROGRESS NOTE ADULT - ATTENDING COMMENTS
S/p tracheostomy. Trach in place and patent. Ventilating well. Will continue to follow. Any trach issues please call ENT [phone].

## 2021-11-23 NOTE — PROGRESS NOTE ADULT - SUBJECTIVE AND OBJECTIVE BOX
ENT ISSUE/POD: POD#3 from tracheostomy    HPI: 62 y/o male POD #3 for tracheostomy #8 shiley cuffed 2/2 respiratory failure.  No reported issues issues overnight        PAST MEDICAL & SURGICAL HISTORY:  HTN (hypertension)    Diabetes mellitus      Allergies    penicillin (Other)    Intolerances      MEDICATIONS  (STANDING):  artificial  tears Solution 1 Drop(s) Both EYES every 6 hours  atorvastatin 40 milliGRAM(s) Oral at bedtime  chlorhexidine 0.12% Liquid 15 milliLiter(s) Oral Mucosa two times a day  chlorhexidine 4% Liquid 1 Application(s) Topical <User Schedule>  dextrose 50% Injectable 25 Gram(s) IV Push once  dextrose 50% Injectable 12.5 Gram(s) IV Push once  dextrose 50% Injectable 25 Gram(s) IV Push once  doxazosin 8 milliGRAM(s) Oral at bedtime  insulin lispro (ADMELOG) corrective regimen sliding scale   SubCutaneous every 6 hours  insulin NPH human recombinant 9 Unit(s) SubCutaneous every 6 hours  sodium chloride 0.9%. 1000 milliLiter(s) (75 mL/Hr) IV Continuous <Continuous>    MEDICATIONS  (PRN):  acetaminophen    Suspension .. 650 milliGRAM(s) Oral every 6 hours PRN Temp greater or equal to 38.5C (101.3F), Mild Pain (1 - 3)      Social History: see consult note    Family history: see consult note    ROS:   ENT: all negative except as noted in HPI   Pulm: denies SOB, cough, hemoptysis  Neuro: denies numbness/tingling, loss of sensation  Endo: denies heat/cold intolerance, excessive sweating      Vital Signs Last 24 Hrs  T(C): 37.4 (23 Nov 2021 07:00), Max: 38.1 (22 Nov 2021 23:00)  T(F): 99.3 (23 Nov 2021 07:00), Max: 100.6 (22 Nov 2021 23:00)  HR: 81 (23 Nov 2021 07:00) (81 - 93)  BP: --  BP(mean): --  RR: 16 (23 Nov 2021 07:00) (13 - 27)  SpO2: 100% (23 Nov 2021 07:00) (99% - 100%)                          7.6    9.57  )-----------( 432      ( 22 Nov 2021 20:44 )             24.2    11-22    139  |  103  |  31<H>  ----------------------------<  186<H>  4.5   |  27  |  1.06    Ca    8.2<L>      22 Nov 2021 20:44  Phos  2.3     11-22  Mg     2.2     11-22     PT/INR - ( 22 Nov 2021 18:00 )   PT: 13.3 sec;   INR: 1.11 ratio         PTT - ( 22 Nov 2021 18:00 )  PTT:30.8 sec    PHYSICAL EXAM:  Gen: NAD  Skin: No rashes, bruises, or lesions  Head: Normocephalic, Atraumatic  Face: no edema, erythema, or fluctuance. Parotid glands soft without mass  Eyes: no scleral injection  Nose: Nares bilaterally patent, no discharge  Mouth: No Stridor / Drooling / Trismus.  Mucosa moist, tongue/uvula midline, oropharynx clear  Neck: #8 shiley inflated cuff and surgicel x1 removed, with minimal serosanguinous oozing from stoma, no active bleeding, sutures x4 and umbilical tie in place.  No lymphadenopathy, trachea midline, no masses  Lymphatic: No lymphadenopathy  Resp: on vent  Neuro: facial nerve intact, no facial droop

## 2021-11-23 NOTE — PROGRESS NOTE ADULT - ASSESSMENT
60 y/o male POD #3 for tracheostomy 2/2 respiratory failure. Pt is doing well, #8 shiley cuffed with surgicel x1 removed, , with minimal serosanguinous oozing from stoma, no active bleed. sutures x4 and umbilical tie in place.

## 2021-11-23 NOTE — BRIEF OPERATIVE NOTE - NSICDXBRIEFPROCEDURE_GEN_ALL_CORE_FT
PROCEDURES:   shunt 23-Nov-2021 18:35:40 R PO VPS Certas at 4 Adalid Machado  
PROCEDURES:  Open tracheostomy 19-Nov-2021 17:38:44  Deepak Wallace  
PROCEDURES:  Suboccipital craniectomy with cervical laminectomy for decompression of medulla and spinal cord 04-Nov-2021 20:47:46  Jesus Worthy  
PROCEDURES:   shunt 23-Nov-2021 18:35:40  Adalid Machado  
PROCEDURES:  Brain aneurysm surgery 11-Nov-2021 00:35:08  Agapito Norman

## 2021-11-23 NOTE — BRIEF OPERATIVE NOTE - NSICDXBRIEFPOSTOP_GEN_ALL_CORE_FT
POST-OP DIAGNOSIS:  Hydrocephalus 23-Nov-2021 18:58:55  Wendy Kelley  
POST-OP DIAGNOSIS:  Cerebellar hemorrhage 04-Nov-2021 20:48:24  Jeuss Worthy  
POST-OP DIAGNOSIS:  Acute respiratory failure with hypoxia 19-Nov-2021 17:39:33  Deepak Wallace  
POST-OP DIAGNOSIS:  Cerebellar hemorrhage 04-Nov-2021 20:48:24  Jesus Worthy  Acute respiratory failure with hypoxia 19-Nov-2021 17:39:33  Deepak Wallace  
POST-OP DIAGNOSIS:  Cerebellar hemorrhage 04-Nov-2021 20:48:24  Jesus Worthy

## 2021-11-23 NOTE — PRE-ANESTHESIA EVALUATION ADULT - NSANTHOSAYNRD_GEN_A_CORE
No. MILTON screening performed.  STOP BANG Legend: 0-2 = LOW Risk; 3-4 = INTERMEDIATE Risk; 5-8 = HIGH Risk

## 2021-11-23 NOTE — PROGRESS NOTE ADULT - SUBJECTIVE AND OBJECTIVE BOX
NSCU Progress Note    24 Hour Events/Subjective:  - s/p PEG placement  - LED significant for b/l BK DVT w/ propagation     IMAGING/DATA:   - Reviewed    ALLERGIES:   - penicillin (Other)    PHYSICAL EXAM:    General: trach to vent  CVS: RRR  Pulm: CTAB  GI: Soft, NTND  Extremities: No LE Edema  Neuro: trached, MELVI, dysconjugate gaze, pupils 2mmNR b/l, +corneals, overbreathing, +cough/gag, RUE ext, LUE nothing, BLE TF      ICU Vital Signs Last 24 Hrs  T(C): 38.1 (23 Nov 2021 03:00), Max: 38.1 (22 Nov 2021 23:00)  T(F): 100.6 (23 Nov 2021 03:00), Max: 100.6 (22 Nov 2021 23:00)  HR: 92 (23 Nov 2021 03:40) (80 - 93)  ABP: 115/84 (23 Nov 2021 03:00) (105/55 - 141/66)  ABP(mean): 99 (23 Nov 2021 03:00) (68 - 99)  RR: 16 (23 Nov 2021 03:00) (13 - 27)  SpO2: 100% (23 Nov 2021 03:40) (99% - 100%)      11-21-21 @ 07:01  -  11-22-21 @ 07:00  --------------------------------------------------------  IN: 2610 mL / OUT: 2250 mL / NET: 360 mL    11-22-21 @ 07:01  -  11-23-21 @ 06:49  --------------------------------------------------------  IN: 1485 mL / OUT: 2780 mL / NET: -1295 mL        Mode: AC/ CMV (Assist Control/ Continuous Mandatory Ventilation), RR (machine): 16, TV (machine): 450, FiO2: 30, PEEP: 5, ITime: 1, MAP: 10, PIP: 19    acetaminophen    Suspension .. 650 milliGRAM(s) Oral every 6 hours PRN  artificial  tears Solution 1 Drop(s) Both EYES every 6 hours  atorvastatin 40 milliGRAM(s) Oral at bedtime  chlorhexidine 0.12% Liquid 15 milliLiter(s) Oral Mucosa two times a day  chlorhexidine 4% Liquid 1 Application(s) Topical <User Schedule>  dextrose 50% Injectable 25 Gram(s) IV Push once  dextrose 50% Injectable 12.5 Gram(s) IV Push once  dextrose 50% Injectable 25 Gram(s) IV Push once  doxazosin 8 milliGRAM(s) Oral at bedtime  insulin lispro (ADMELOG) corrective regimen sliding scale   SubCutaneous every 6 hours  insulin NPH human recombinant 9 Unit(s) SubCutaneous every 6 hours  sodium chloride 0.9%. 1000 milliLiter(s) (75 mL/Hr) IV Continuous <Continuous>      LABS:  Na: 139 (11-22 @ 20:44), 139 (11-21 @ 22:18), 135 (11-20 @ 21:46)  K: 4.5 (11-22 @ 20:44), 4.1 (11-21 @ 22:18), 4.0 (11-20 @ 21:46)  Cl: 103 (11-22 @ 20:44), 106 (11-21 @ 22:18), 101 (11-20 @ 21:46)  CO2: 27 (11-22 @ 20:44), 26 (11-21 @ 22:18), 25 (11-20 @ 21:46)  BUN: 31 (11-22 @ 20:44), 33 (11-21 @ 22:18), 34 (11-20 @ 21:46)  Cr: 1.06 (11-22 @ 20:44), 1.17 (11-21 @ 22:18), 1.10 (11-20 @ 21:46)  Glu: 186(11-22 @ 20:44), 104(11-21 @ 22:18), 159(11-20 @ 21:46)    Hgb: 7.6 (11-22 @ 20:44), 7.3 (11-21 @ 22:18), 7.8 (11-20 @ 21:46)  Hct: 24.2 (11-22 @ 20:44), 22.9 (11-21 @ 22:18), 24.1 (11-20 @ 21:46)  WBC: 9.57 (11-22 @ 20:44), 10.18 (11-21 @ 22:18), 10.72 (11-20 @ 21:46)  Plt: 432 (11-22 @ 20:44), 388 (11-21 @ 22:18), 362 (11-20 @ 21:46)    INR: 1.11 11-22-21 @ 18:00, 1.17 11-20-21 @ 21:46  PTT: 30.8 11-22-21 @ 18:00, 30.8 11-20-21 @ 21:46

## 2021-11-23 NOTE — PROGRESS NOTE ADULT - ASSESSMENT
ANGLE ALICIA  61M pmhx HTN, DM at work complaining of HA ~8:30, starting feeling dizzy and vomiting, HTN/keke when EMS got to him. SBP 220s, HR 50s. CTH shows large posterior fossa bleed w/ IVH/SAH/hydro.   -preop vPS  -Xfer RCU after

## 2021-11-23 NOTE — PROGRESS NOTE ADULT - SUBJECTIVE AND OBJECTIVE BOX
Chief Complaint:  Patient is a 61y old  Male who presents with a chief complaint of AMS (17 Nov 2021 13:07)      Date of service 11-23-21 @ 16:31      Interval Events:     Hospital Medications:        Review of Systems:  General:  No wt loss, fevers, chills, night sweats, fatigue,   Eyes:  Good vision, no reported pain  ENT:  No sore throat, pain, runny nose, dysphagia  CV:  No pain, palpitations, hypo/hypertension  Resp:  No dyspnea, cough, tachypnea, wheezing  GI:  See HPI  :  No pain, bleeding, incontinence, nocturia  Muscle:  No pain, weakness  Neuro:  No weakness, tingling, memory problems  Psych:  No fatigue, insomnia, mood problems, depression  Endocrine:  No polyuria, polydipsia, cold/heat intolerance  Heme:  No petechiae, ecchymosis, easy bruisability  Integumentary:  No rash, edema    PHYSICAL EXAM:   Vital Signs:  Vital Signs Last 24 Hrs  T(C): 37.4 (23 Nov 2021 15:00), Max: 38.1 (22 Nov 2021 23:00)  T(F): 99.4 (23 Nov 2021 15:00), Max: 100.6 (22 Nov 2021 23:00)  HR: 89 (23 Nov 2021 15:50) (81 - 99)  BP: --  BP(mean): --  RR: 16 (23 Nov 2021 15:00) (14 - 27)  SpO2: 98% (23 Nov 2021 15:50) (96% - 100%)  Daily Height in cm: 177.8 (23 Nov 2021 15:13)    Daily       PHYSICAL EXAM:     GENERAL:  Appears stated age, well-groomed, well-nourished, no distress  HEENT:  NC/AT,  conjunctivae anicteric, clear and pink,   NECK: supple, trachea midline  CHEST:  Full & symmetric excursion, no increased effort, breath sounds clear  HEART:  Regular rhythm, no JVD  ABDOMEN:  Soft, non-tender, non-distended, normoactive bowel sounds,  no masses , no hepatosplenomegaly  EXTREMITIES:  no cyanosis,clubbing or edema  SKIN:  No rash, erythema, or, ecchymoses, no jaundice  NEURO:  Alert, non-focal, no asterixis  PSYCH: Appropriate affect, oriented to place and time  RECTAL: Deferred      LABS Personally reviewed by me:                        7.6    9.57  )-----------( 432      ( 22 Nov 2021 20:44 )             24.2     Mean Cell Volume: 96.4 fl (11-22-21 @ 20:44)    11-22    139  |  103  |  31<H>  ----------------------------<  186<H>  4.5   |  27  |  1.06    Ca    8.2<L>      22 Nov 2021 20:44  Phos  2.3     11-22  Mg     2.2     11-22        PT/INR - ( 22 Nov 2021 18:00 )   PT: 13.3 sec;   INR: 1.11 ratio         PTT - ( 22 Nov 2021 18:00 )  PTT:30.8 sec                            7.6    9.57  )-----------( 432      ( 22 Nov 2021 20:44 )             24.2                         7.3    10.18 )-----------( 388      ( 21 Nov 2021 22:18 )             22.9                         7.8    10.72 )-----------( 362      ( 20 Nov 2021 21:46 )             24.1       Imaging personally reviewed by me:

## 2021-11-23 NOTE — PRE-ANESTHESIA EVALUATION ADULT - NSANTHRISKNONERD_GEN_ALL_CORE
No risk alerts present
Risk Alerts:
No risk alerts present
No risk alerts present
Risk Alerts:
Risk Alerts:

## 2021-11-23 NOTE — PROGRESS NOTE ADULT - SUBJECTIVE AND OBJECTIVE BOX
Patient seen and examined at bedside.    --Anticoagulation--    T(C): 38.1 (11-22-21 @ 23:00), Max: 38.1 (11-22-21 @ 23:00)  HR: 88 (11-22-21 @ 23:00) (80 - 95)  BP: --  RR: 15 (11-22-21 @ 23:00) (13 - 27)  SpO2: 100% (11-22-21 @ 23:00) (99% - 100%)  Wt(kg): --    Exam:    trached, MELVI, dysconjugate gaze, pupils 2mmNR b/l, +corneals, overbreathing, +cough/gag, RUE ext, LUE nothing, BLE TF

## 2021-11-23 NOTE — PROGRESS NOTE ADULT - PROBLEM SELECTOR PLAN 1
continue vent per RT/ICU  - gentle suction prn   - continue with trach site care, keep dry and clean   - call ENT with any issues.

## 2021-11-23 NOTE — BRIEF OPERATIVE NOTE - OPERATION/FINDINGS
SOC
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
placement of peritoneal portion of  shunt, abdomen entered via open Lei technique. tip of  shunt visualized and functioning
None
R parietal VPS stealth guided, Certas at 4, no anti-siphon. Laparoscopic assistance for intraperitoneal placement- Dr. Germain.

## 2021-11-24 LAB
ANION GAP SERPL CALC-SCNC: 10 MMOL/L — SIGNIFICANT CHANGE UP (ref 5–17)
ANION GAP SERPL CALC-SCNC: 11 MMOL/L — SIGNIFICANT CHANGE UP (ref 5–17)
APPEARANCE UR: CLEAR — SIGNIFICANT CHANGE UP
APTT BLD: 29.6 SEC — SIGNIFICANT CHANGE UP (ref 27.5–35.5)
BACTERIA # UR AUTO: NEGATIVE — SIGNIFICANT CHANGE UP
BILIRUB UR-MCNC: NEGATIVE — SIGNIFICANT CHANGE UP
BUN SERPL-MCNC: 27 MG/DL — HIGH (ref 7–23)
BUN SERPL-MCNC: 29 MG/DL — HIGH (ref 7–23)
CALCIUM SERPL-MCNC: 8.4 MG/DL — SIGNIFICANT CHANGE UP (ref 8.4–10.5)
CALCIUM SERPL-MCNC: 8.4 MG/DL — SIGNIFICANT CHANGE UP (ref 8.4–10.5)
CHLORIDE SERPL-SCNC: 102 MMOL/L — SIGNIFICANT CHANGE UP (ref 96–108)
CHLORIDE SERPL-SCNC: 103 MMOL/L — SIGNIFICANT CHANGE UP (ref 96–108)
CO2 SERPL-SCNC: 24 MMOL/L — SIGNIFICANT CHANGE UP (ref 22–31)
CO2 SERPL-SCNC: 26 MMOL/L — SIGNIFICANT CHANGE UP (ref 22–31)
COLOR SPEC: SIGNIFICANT CHANGE UP
CREAT SERPL-MCNC: 0.96 MG/DL — SIGNIFICANT CHANGE UP (ref 0.5–1.3)
CREAT SERPL-MCNC: 0.99 MG/DL — SIGNIFICANT CHANGE UP (ref 0.5–1.3)
CULTURE RESULTS: SIGNIFICANT CHANGE UP
DIFF PNL FLD: ABNORMAL
EPI CELLS # UR: 2 /HPF — SIGNIFICANT CHANGE UP
GLUCOSE SERPL-MCNC: 171 MG/DL — HIGH (ref 70–99)
GLUCOSE SERPL-MCNC: 175 MG/DL — HIGH (ref 70–99)
GLUCOSE UR QL: ABNORMAL
GRAM STN FLD: SIGNIFICANT CHANGE UP
HCT VFR BLD CALC: 23.7 % — LOW (ref 39–50)
HCT VFR BLD CALC: 25.2 % — LOW (ref 39–50)
HGB BLD-MCNC: 7.5 G/DL — LOW (ref 13–17)
HGB BLD-MCNC: 8 G/DL — LOW (ref 13–17)
HYALINE CASTS # UR AUTO: 6 /LPF — HIGH (ref 0–2)
INR BLD: 1.11 RATIO — SIGNIFICANT CHANGE UP (ref 0.88–1.16)
KETONES UR-MCNC: NEGATIVE — SIGNIFICANT CHANGE UP
LEUKOCYTE ESTERASE UR-ACNC: NEGATIVE — SIGNIFICANT CHANGE UP
MAGNESIUM SERPL-MCNC: 1.9 MG/DL — SIGNIFICANT CHANGE UP (ref 1.6–2.6)
MAGNESIUM SERPL-MCNC: 2.1 MG/DL — SIGNIFICANT CHANGE UP (ref 1.6–2.6)
MCHC RBC-ENTMCNC: 29.9 PG — SIGNIFICANT CHANGE UP (ref 27–34)
MCHC RBC-ENTMCNC: 30 PG — SIGNIFICANT CHANGE UP (ref 27–34)
MCHC RBC-ENTMCNC: 31.6 GM/DL — LOW (ref 32–36)
MCHC RBC-ENTMCNC: 31.7 GM/DL — LOW (ref 32–36)
MCV RBC AUTO: 94.4 FL — SIGNIFICANT CHANGE UP (ref 80–100)
MCV RBC AUTO: 94.4 FL — SIGNIFICANT CHANGE UP (ref 80–100)
NITRITE UR-MCNC: NEGATIVE — SIGNIFICANT CHANGE UP
NRBC # BLD: 0 /100 WBCS — SIGNIFICANT CHANGE UP (ref 0–0)
NRBC # BLD: 0 /100 WBCS — SIGNIFICANT CHANGE UP (ref 0–0)
PH UR: 6 — SIGNIFICANT CHANGE UP (ref 5–8)
PHOSPHATE SERPL-MCNC: 2.5 MG/DL — SIGNIFICANT CHANGE UP (ref 2.5–4.5)
PHOSPHATE SERPL-MCNC: 3.4 MG/DL — SIGNIFICANT CHANGE UP (ref 2.5–4.5)
PLATELET # BLD AUTO: 410 K/UL — HIGH (ref 150–400)
PLATELET # BLD AUTO: 470 K/UL — HIGH (ref 150–400)
POTASSIUM SERPL-MCNC: 4.2 MMOL/L — SIGNIFICANT CHANGE UP (ref 3.5–5.3)
POTASSIUM SERPL-MCNC: 4.6 MMOL/L — SIGNIFICANT CHANGE UP (ref 3.5–5.3)
POTASSIUM SERPL-SCNC: 4.2 MMOL/L — SIGNIFICANT CHANGE UP (ref 3.5–5.3)
POTASSIUM SERPL-SCNC: 4.6 MMOL/L — SIGNIFICANT CHANGE UP (ref 3.5–5.3)
PROCALCITONIN SERPL-MCNC: 0.26 NG/ML — HIGH (ref 0.02–0.1)
PROT UR-MCNC: ABNORMAL
PROTHROM AB SERPL-ACNC: 13.3 SEC — SIGNIFICANT CHANGE UP (ref 10.6–13.6)
RBC # BLD: 2.51 M/UL — LOW (ref 4.2–5.8)
RBC # BLD: 2.67 M/UL — LOW (ref 4.2–5.8)
RBC # FLD: 13.2 % — SIGNIFICANT CHANGE UP (ref 10.3–14.5)
RBC # FLD: 13.3 % — SIGNIFICANT CHANGE UP (ref 10.3–14.5)
RBC CASTS # UR COMP ASSIST: 12 /HPF — HIGH (ref 0–4)
SODIUM SERPL-SCNC: 137 MMOL/L — SIGNIFICANT CHANGE UP (ref 135–145)
SODIUM SERPL-SCNC: 139 MMOL/L — SIGNIFICANT CHANGE UP (ref 135–145)
SP GR SPEC: 1.02 — SIGNIFICANT CHANGE UP (ref 1.01–1.02)
SPECIMEN SOURCE: SIGNIFICANT CHANGE UP
SPECIMEN SOURCE: SIGNIFICANT CHANGE UP
UROBILINOGEN FLD QL: NEGATIVE — SIGNIFICANT CHANGE UP
WBC # BLD: 11.36 K/UL — HIGH (ref 3.8–10.5)
WBC # BLD: 12.2 K/UL — HIGH (ref 3.8–10.5)
WBC # FLD AUTO: 11.36 K/UL — HIGH (ref 3.8–10.5)
WBC # FLD AUTO: 12.2 K/UL — HIGH (ref 3.8–10.5)
WBC UR QL: 5 /HPF — SIGNIFICANT CHANGE UP (ref 0–5)

## 2021-11-24 PROCEDURE — 99231 SBSQ HOSP IP/OBS SF/LOW 25: CPT

## 2021-11-24 PROCEDURE — 70450 CT HEAD/BRAIN W/O DYE: CPT | Mod: 26

## 2021-11-24 PROCEDURE — 93970 EXTREMITY STUDY: CPT | Mod: 26

## 2021-11-24 PROCEDURE — 99291 CRITICAL CARE FIRST HOUR: CPT

## 2021-11-24 RX ORDER — SODIUM CHLORIDE 9 MG/ML
3 INJECTION INTRAMUSCULAR; INTRAVENOUS; SUBCUTANEOUS EVERY 6 HOURS
Refills: 0 | Status: DISCONTINUED | OUTPATIENT
Start: 2021-11-24 | End: 2022-01-01

## 2021-11-24 RX ORDER — HEPARIN SODIUM 5000 [USP'U]/ML
5000 INJECTION INTRAVENOUS; SUBCUTANEOUS EVERY 8 HOURS
Refills: 0 | Status: DISCONTINUED | OUTPATIENT
Start: 2021-11-24 | End: 2021-12-02

## 2021-11-24 RX ORDER — CHLORHEXIDINE GLUCONATE 213 G/1000ML
1 SOLUTION TOPICAL
Refills: 0 | Status: DISCONTINUED | OUTPATIENT
Start: 2021-11-24 | End: 2021-12-10

## 2021-11-24 RX ADMIN — HUMAN INSULIN 9 UNIT(S): 100 INJECTION, SUSPENSION SUBCUTANEOUS at 05:11

## 2021-11-24 RX ADMIN — Medication 650 MILLIGRAM(S): at 09:30

## 2021-11-24 RX ADMIN — Medication 100 MILLIGRAM(S): at 13:56

## 2021-11-24 RX ADMIN — HUMAN INSULIN 9 UNIT(S): 100 INJECTION, SUSPENSION SUBCUTANEOUS at 12:02

## 2021-11-24 RX ADMIN — HEPARIN SODIUM 5000 UNIT(S): 5000 INJECTION INTRAVENOUS; SUBCUTANEOUS at 13:56

## 2021-11-24 RX ADMIN — SODIUM CHLORIDE 3 MILLILITER(S): 9 INJECTION INTRAMUSCULAR; INTRAVENOUS; SUBCUTANEOUS at 11:11

## 2021-11-24 RX ADMIN — Medication 2: at 23:25

## 2021-11-24 RX ADMIN — HUMAN INSULIN 9 UNIT(S): 100 INJECTION, SUSPENSION SUBCUTANEOUS at 00:41

## 2021-11-24 RX ADMIN — Medication 650 MILLIGRAM(S): at 15:29

## 2021-11-24 RX ADMIN — Medication 4: at 12:01

## 2021-11-24 RX ADMIN — Medication 100 MILLIGRAM(S): at 13:57

## 2021-11-24 RX ADMIN — SODIUM CHLORIDE 3 MILLILITER(S): 9 INJECTION INTRAMUSCULAR; INTRAVENOUS; SUBCUTANEOUS at 17:20

## 2021-11-24 RX ADMIN — Medication 1 DROP(S): at 23:25

## 2021-11-24 RX ADMIN — Medication 1 DROP(S): at 05:08

## 2021-11-24 RX ADMIN — Medication 1 DROP(S): at 18:34

## 2021-11-24 RX ADMIN — CHLORHEXIDINE GLUCONATE 15 MILLILITER(S): 213 SOLUTION TOPICAL at 05:08

## 2021-11-24 RX ADMIN — Medication 100 MILLIGRAM(S): at 05:09

## 2021-11-24 RX ADMIN — Medication 1 DROP(S): at 00:41

## 2021-11-24 RX ADMIN — Medication 2: at 05:10

## 2021-11-24 RX ADMIN — Medication 650 MILLIGRAM(S): at 23:34

## 2021-11-24 RX ADMIN — HUMAN INSULIN 9 UNIT(S): 100 INJECTION, SUSPENSION SUBCUTANEOUS at 23:24

## 2021-11-24 RX ADMIN — Medication 2: at 18:34

## 2021-11-24 RX ADMIN — Medication 650 MILLIGRAM(S): at 08:50

## 2021-11-24 RX ADMIN — ATORVASTATIN CALCIUM 40 MILLIGRAM(S): 80 TABLET, FILM COATED ORAL at 21:08

## 2021-11-24 RX ADMIN — Medication 1 DROP(S): at 12:03

## 2021-11-24 RX ADMIN — SODIUM CHLORIDE 3 MILLILITER(S): 9 INJECTION INTRAMUSCULAR; INTRAVENOUS; SUBCUTANEOUS at 23:34

## 2021-11-24 RX ADMIN — CHLORHEXIDINE GLUCONATE 15 MILLILITER(S): 213 SOLUTION TOPICAL at 18:34

## 2021-11-24 RX ADMIN — Medication 2: at 00:40

## 2021-11-24 RX ADMIN — Medication 650 MILLIGRAM(S): at 16:00

## 2021-11-24 RX ADMIN — SODIUM CHLORIDE 3 MILLILITER(S): 9 INJECTION INTRAMUSCULAR; INTRAVENOUS; SUBCUTANEOUS at 06:38

## 2021-11-24 RX ADMIN — HUMAN INSULIN 9 UNIT(S): 100 INJECTION, SUSPENSION SUBCUTANEOUS at 18:34

## 2021-11-24 RX ADMIN — Medication 8 MILLIGRAM(S): at 21:08

## 2021-11-24 RX ADMIN — HEPARIN SODIUM 5000 UNIT(S): 5000 INJECTION INTRAVENOUS; SUBCUTANEOUS at 21:08

## 2021-11-24 RX ADMIN — Medication 100 MILLIGRAM(S): at 21:08

## 2021-11-24 RX ADMIN — CHLORHEXIDINE GLUCONATE 1 APPLICATION(S): 213 SOLUTION TOPICAL at 21:07

## 2021-11-24 NOTE — PROGRESS NOTE ADULT - SUBJECTIVE AND OBJECTIVE BOX
Patient seen and examined at bedside.    --Anticoagulation--    T(C): 38.1 (11-24-21 @ 04:00), Max: 38.1 (11-24-21 @ 04:00)  HR: 98 (11-24-21 @ 03:00) (77 - 101)  BP: --  RR: 21 (11-24-21 @ 03:00) (13 - 21)  SpO2: 99% (11-24-21 @ 03:00) (90% - 100%)  Wt(kg): --    Exam:      trached, MELVI, dysconjugate gaze, pupils 2mmNR b/l, +corneals, overbreathing, +cough/gag, RUE ext, LUE nothing, BLE TF

## 2021-11-24 NOTE — PROGRESS NOTE ADULT - SUBJECTIVE AND OBJECTIVE BOX
Nighttime rounds performed  s/p VPS Certas at 4, post op CTH stable, smaller vents, consider repeat CTH   refer to earlier note for detail  LUE HG spont ?reflex, RUE trace finger movement, RLE wiggles toes but not to command, LLE TF  -160, hypertensive post op, now improved, continue cardura, labetalol  in sinus rhythm  glucerna for tube feeding NPO after midnight, continue NPH  tolerating trach collar, however frequent suctioning requirement   f/u sputum cultures from 11/23  uptrending leukocytosis, f/u diff  Nighttime rounds performed  s/p VPS Certas at 4, post op CTH stable, smaller vents, consider repeat CTH   refer to earlier note for detail  LUE HG spont ?reflex, RUE trace finger movement, RLE wiggles toes but not to command, LLE TF  -160, hypertensive post op, now improved, continue cardura, labetalol  in sinus rhythm  glucerna for tube feeding NPO after midnight, continue NPH  tolerating trach collar, however frequent suctioning requirement, copious  f/u sputum cultures from 11/23; start levofloxacin x7d  uptrending leukocytosis, f/u diff  Nighttime rounds performed  s/p VPS Certas at 4, post op CTH stable, smaller vents, consider repeat CTH   refer to earlier note for detail  EO to stim, closes eye to commands, sticks out tongue, wiggles fingers and toes to command   -160, hypertensive post op, now improved, continue cardura, labetalol  in sinus rhythm  glucerna for tube feeding NPO after midnight, continue NPH  tolerating trach collar, however frequent suctioning requirement, copious  f/u sputum cultures from 11/23; start levofloxacin x7d  uptrending leukocytosis, f/u diff

## 2021-11-24 NOTE — PROGRESS NOTE ADULT - ASSESSMENT
60 y/o male POD #5 for tracheostomy 2/2 respiratory failure. Pt is doing well, #8 shiley cuffed with surgicel x1 removed, with minimal serosanguinous oozing from stoma, no active bleed. Sutures x4 and umbilical tie removed by primary team, secured with velcro tie.

## 2021-11-24 NOTE — PROGRESS NOTE ADULT - SUBJECTIVE AND OBJECTIVE BOX
NSCU Progress Note    24 Hour Events/Subjective:  - s/p PEG placement  - LED significant for b/l BK DVT w/ propagation     IMAGING/DATA:   - Reviewed    ALLERGIES:   - penicillin (Other)    PHYSICAL EXAM:    General: trach to vent  CVS: RRR  Pulm: CTAB  GI: Soft, NTND  Extremities: No LE Edema  Neuro: trached, MELVI, dysconjugate gaze, pupils 2mmNR b/l, +corneals, overbreathing, +cough/gag, RUE ext, LUE nothing, BLE TF    ICU Vital Signs Last 24 Hrs  T(C): 38.1 (24 Nov 2021 04:00), Max: 38.1 (24 Nov 2021 04:00)  T(F): 100.6 (24 Nov 2021 04:00), Max: 100.6 (24 Nov 2021 04:00)  HR: 83 (24 Nov 2021 06:00) (77 - 101)  ABP: 117/51 (24 Nov 2021 06:00) (97/44 - 185/83)  ABP(mean): 71 (24 Nov 2021 06:00) (62 - 123)  RR: 17 (24 Nov 2021 06:00) (13 - 21)  SpO2: 99% (24 Nov 2021 06:00) (90% - 100%)      11-22-21 @ 07:01  -  11-23-21 @ 07:00  --------------------------------------------------------  IN: 1560 mL / OUT: 2780 mL / NET: -1220 mL    11-23-21 @ 07:01  -  11-24-21 @ 06:16  --------------------------------------------------------  IN: 1020 mL / OUT: 1555 mL / NET: -535 mL        Mode: standby    acetaminophen    Suspension .. 650 milliGRAM(s) Oral every 6 hours PRN  artificial  tears Solution 1 Drop(s) Both EYES every 6 hours  atorvastatin 40 milliGRAM(s) Oral at bedtime  ceFAZolin   IVPB 2000 milliGRAM(s) IV Intermittent every 8 hours  chlorhexidine 0.12% Liquid 15 milliLiter(s) Oral Mucosa two times a day  dextrose 50% Injectable 25 Gram(s) IV Push once  dextrose 50% Injectable 12.5 Gram(s) IV Push once  doxazosin 8 milliGRAM(s) Oral at bedtime  insulin lispro (ADMELOG) corrective regimen sliding scale   SubCutaneous every 6 hours  insulin NPH human recombinant 9 Unit(s) SubCutaneous every 6 hours  labetalol 100 milliGRAM(s) Oral every 8 hours  sodium chloride 3%  Inhalation 3 milliLiter(s) Inhalation every 6 hours      LABS:  Na: 137 (11-24 @ 01:19), 141 (11-23 @ 20:20), 139 (11-22 @ 20:44), 139 (11-21 @ 22:18)  K: 4.6 (11-24 @ 01:19), 4.6 (11-23 @ 20:20), 4.5 (11-22 @ 20:44), 4.1 (11-21 @ 22:18)  Cl: 103 (11-24 @ 01:19), 104 (11-23 @ 20:20), 103 (11-22 @ 20:44), 106 (11-21 @ 22:18)  CO2: 24 (11-24 @ 01:19), 26 (11-23 @ 20:20), 27 (11-22 @ 20:44), 26 (11-21 @ 22:18)  BUN: 27 (11-24 @ 01:19), 27 (11-23 @ 20:20), 31 (11-22 @ 20:44), 33 (11-21 @ 22:18)  Cr: 0.96 (11-24 @ 01:19), 0.94 (11-23 @ 20:20), 1.06 (11-22 @ 20:44), 1.17 (11-21 @ 22:18)  Glu: 171(11-24 @ 01:19), 181(11-23 @ 20:20), 186(11-22 @ 20:44), 104(11-21 @ 22:18)    Hgb: 8.0 (11-24 @ 01:19), 7.8 (11-23 @ 20:20), 7.6 (11-22 @ 20:44), 7.3 (11-21 @ 22:18)  Hct: 25.2 (11-24 @ 01:19), 25.3 (11-23 @ 20:20), 24.2 (11-22 @ 20:44), 22.9 (11-21 @ 22:18)  WBC: 11.36 (11-24 @ 01:19), 9.41 (11-23 @ 20:20), 9.57 (11-22 @ 20:44), 10.18 (11-21 @ 22:18)  Plt: 470 (11-24 @ 01:19), 456 (11-23 @ 20:20), 432 (11-22 @ 20:44), 388 (11-21 @ 22:18)    INR: 1.11 11-24-21 @ 01:19, 1.11 11-22-21 @ 18:00  PTT: 29.6 11-24-21 @ 01:19, 30.8 11-22-21 @ 18:00       NSCU Progress Note    24 Hour Events/Subjective:  - febrile     IMAGING/DATA:   - Reviewed    ALLERGIES:   - penicillin (Other)    PHYSICAL EXAM:    General: trach to vent  CVS: RRR  Pulm: CTAB  GI: Soft, NTND  Extremities: No LE Edema  Neuro: trached, MELVI, dysconjugate gaze, pupils 2mmNR b/l, +corneals, overbreathing, +cough/gag, RUE ext, LUE nothing, BLE TF          ICU Vital Signs Last 24 Hrs  T(C): 38.7 (24 Nov 2021 08:00), Max: 38.7 (24 Nov 2021 08:00)  T(F): 101.6 (24 Nov 2021 08:00), Max: 101.6 (24 Nov 2021 08:00)  HR: 83 (24 Nov 2021 09:32) (77 - 101)  ABP: 144/64 (24 Nov 2021 09:00) (97/44 - 185/83)  ABP(mean): 88 (24 Nov 2021 09:00) (62 - 123)  RR: 20 (24 Nov 2021 09:32) (13 - 21)  SpO2: 99% (24 Nov 2021 09:32) (90% - 100%)      11-23-21 @ 07:01  -  11-24-21 @ 07:00  --------------------------------------------------------  IN: 1310 mL / OUT: 1555 mL / NET: -245 mL    11-24-21 @ 07:01  -  11-24-21 @ 10:15  --------------------------------------------------------  IN: 140 mL / OUT: 0 mL / NET: 140 mL        Mode: Vent off    acetaminophen    Suspension .. 650 milliGRAM(s) Oral every 6 hours PRN  artificial  tears Solution 1 Drop(s) Both EYES every 6 hours  atorvastatin 40 milliGRAM(s) Oral at bedtime  ceFAZolin   IVPB 2000 milliGRAM(s) IV Intermittent every 8 hours  chlorhexidine 0.12% Liquid 15 milliLiter(s) Oral Mucosa two times a day  dextrose 50% Injectable 25 Gram(s) IV Push once  dextrose 50% Injectable 12.5 Gram(s) IV Push once  doxazosin 8 milliGRAM(s) Oral at bedtime  insulin lispro (ADMELOG) corrective regimen sliding scale   SubCutaneous every 6 hours  insulin NPH human recombinant 9 Unit(s) SubCutaneous every 6 hours  labetalol 100 milliGRAM(s) Oral every 8 hours  sodium chloride 3%  Inhalation 3 milliLiter(s) Inhalation every 6 hours      LABS:  Na: 137 (11-24 @ 01:19), 141 (11-23 @ 20:20), 139 (11-22 @ 20:44), 139 (11-21 @ 22:18)  K: 4.6 (11-24 @ 01:19), 4.6 (11-23 @ 20:20), 4.5 (11-22 @ 20:44), 4.1 (11-21 @ 22:18)  Cl: 103 (11-24 @ 01:19), 104 (11-23 @ 20:20), 103 (11-22 @ 20:44), 106 (11-21 @ 22:18)  CO2: 24 (11-24 @ 01:19), 26 (11-23 @ 20:20), 27 (11-22 @ 20:44), 26 (11-21 @ 22:18)  BUN: 27 (11-24 @ 01:19), 27 (11-23 @ 20:20), 31 (11-22 @ 20:44), 33 (11-21 @ 22:18)  Cr: 0.96 (11-24 @ 01:19), 0.94 (11-23 @ 20:20), 1.06 (11-22 @ 20:44), 1.17 (11-21 @ 22:18)  Glu: 171(11-24 @ 01:19), 181(11-23 @ 20:20), 186(11-22 @ 20:44), 104(11-21 @ 22:18)    Hgb: 8.0 (11-24 @ 01:19), 7.8 (11-23 @ 20:20), 7.6 (11-22 @ 20:44), 7.3 (11-21 @ 22:18)  Hct: 25.2 (11-24 @ 01:19), 25.3 (11-23 @ 20:20), 24.2 (11-22 @ 20:44), 22.9 (11-21 @ 22:18)  WBC: 11.36 (11-24 @ 01:19), 9.41 (11-23 @ 20:20), 9.57 (11-22 @ 20:44), 10.18 (11-21 @ 22:18)  Plt: 470 (11-24 @ 01:19), 456 (11-23 @ 20:20), 432 (11-22 @ 20:44), 388 (11-21 @ 22:18)    INR: 1.11 11-24-21 @ 01:19, 1.11 11-22-21 @ 18:00  PTT: 29.6 11-24-21 @ 01:19, 30.8 11-22-21 @ 18:00

## 2021-11-24 NOTE — PROGRESS NOTE ADULT - SUBJECTIVE AND OBJECTIVE BOX
ENT ISSUE/POD: POD#4 from tracheostomy    HPI: 60 y/o male POD #5 for tracheostomy #8 shiley cuffed 2/2 respiratory failure.  No reported issues issues overnight        PAST MEDICAL & SURGICAL HISTORY:  HTN (hypertension)    Diabetes mellitus      Allergies    penicillin (Other)    Intolerances      MEDICATIONS  (STANDING):  artificial  tears Solution 1 Drop(s) Both EYES every 6 hours  atorvastatin 40 milliGRAM(s) Oral at bedtime  ceFAZolin   IVPB 2000 milliGRAM(s) IV Intermittent every 8 hours  chlorhexidine 0.12% Liquid 15 milliLiter(s) Oral Mucosa two times a day  dextrose 50% Injectable 25 Gram(s) IV Push once  dextrose 50% Injectable 12.5 Gram(s) IV Push once  doxazosin 8 milliGRAM(s) Oral at bedtime  insulin lispro (ADMELOG) corrective regimen sliding scale   SubCutaneous every 6 hours  insulin NPH human recombinant 9 Unit(s) SubCutaneous every 6 hours  labetalol 100 milliGRAM(s) Oral every 8 hours  sodium chloride 3%  Inhalation 3 milliLiter(s) Inhalation every 6 hours    MEDICATIONS  (PRN):  acetaminophen    Suspension .. 650 milliGRAM(s) Oral every 6 hours PRN Temp greater or equal to 38C (100.4F), Mild Pain (1 - 3)      Social History: see consult    Family history: see consult    ROS:   uto      Vital Signs Last 24 Hrs  T(C): 38.1 (24 Nov 2021 04:00), Max: 38.1 (24 Nov 2021 04:00)  T(F): 100.6 (24 Nov 2021 04:00), Max: 100.6 (24 Nov 2021 04:00)  HR: 85 (24 Nov 2021 08:00) (77 - 101)  BP: --  BP(mean): --  RR: 20 (24 Nov 2021 08:00) (13 - 21)  SpO2: 99% (24 Nov 2021 08:00) (90% - 100%)                          8.0    11.36 )-----------( 470      ( 24 Nov 2021 01:19 )             25.2    11-24    137  |  103  |  27<H>  ----------------------------<  171<H>  4.6   |  24  |  0.96    Ca    8.4      24 Nov 2021 01:19  Phos  2.5     11-24  Mg     1.9     11-24     PT/INR - ( 24 Nov 2021 01:19 )   PT: 13.3 sec;   INR: 1.11 ratio         PTT - ( 24 Nov 2021 01:19 )  PTT:29.6 sec    PHYSICAL EXAM:  Gen: NAD  Skin: No rashes, bruises, or lesions  Head: Normocephalic, Atraumatic  Face: no edema, erythema, or fluctuance. Parotid glands soft without mass  Eyes: no scleral injection  Nose: Nares bilaterally patent, no discharge  Mouth: No Stridor / Drooling / Trismus.  Mucosa moist, tongue/uvula midline, oropharynx clear  Neck: #8 shiley inflated cuff and surgicel x1 removed, with minimal serosanguinous oozing from stoma, no active bleeding, secured with velcro tie. No lymphadenopathy, trachea midline, no masses  Lymphatic: No lymphadenopathy  Resp: on vent  Neuro: facial nerve intact, no facial droop

## 2021-11-24 NOTE — PROGRESS NOTE ADULT - SUBJECTIVE AND OBJECTIVE BOX
SURGERY PROGRESS NOTE  Hospital Day #11-04-21 (20d)  Procedure/Dx: Suboccipital craniectomy with cervical laminectomy for decompression of medulla and spinal cord  Brain aneurysm surgery  Open tracheostomy   shunt    Pt seen and examined at bedside.  Patient is sedated and       intubated. Appears stable    PMHx      Vital Signs Last 24 Hrs  T(C): 38.7 (24 Nov 2021 08:00), Max: 38.7 (24 Nov 2021 08:00)  T(F): 101.6 (24 Nov 2021 08:00), Max: 101.6 (24 Nov 2021 08:00)  HR: 83 (24 Nov 2021 09:32) (77 - 101)  RR: 20 (24 Nov 2021 09:32) (13 - 21)  SpO2: 99% (24 Nov 2021 09:32) (90% - 100%)    PHYSICAL EXAM   General:  sedated  Neck:  Supple, FOM, no palpable masses   Chest:  Equal expansion bilaterally, equal breath sounds, intubated  Abdomen:  Soft, nondistended, nontender to palpation in all four quadrants     I's & O's  11-23-21 @ 07:01  -  11-24-21 @ 07:00  --------------------------------------------------------  Total NET: -245 mL  11-24-21 @ 07:01  -  11-24-21 @ 10:19  --------------------------------------------------------  Total NET: -930 mL    MEDICATIONS:  ANTIBIOTICS: ceFAZolin   IVPB 2000 milliGRAM(s)    LAB/STUDIES:             8.0    11.36 )-----------( 470      ( 24 Nov 2021 01:19 )             25.2   137  |  103  |  27<H>  ----------------------------<  171<H>  4.6   |  24  |  0.96  Ca    8.4      24 Nov 2021 01:19  Phos  2.5     11-24  Mg     1.9     11-24    PT/INR - ( 24 Nov 2021 01:19 )   PT: 13.3 sec;   INR: 1.11 ratio    PTT - ( 24 Nov 2021 01:19 )  PTT:29.6 sec

## 2021-11-24 NOTE — PROGRESS NOTE ADULT - ASSESSMENT
ANGLE ALICIA  61M pmhx HTN, DM at work complaining of HA ~8:30, starting feeling dizzy and vomiting, HTN/ekke when EMS got to him. SBP 220s, HR 50s. CTH shows large posterior fossa bleed w/ IVH/SAH/hydro.   -s/p VPS  -RCU xfer

## 2021-11-24 NOTE — PROGRESS NOTE ADULT - ASSESSMENT
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 EVD clamped & removed       NEURO:  ICH score: 4  - neuro checks q4  - pain control  - plan for repeat angio at some point      PULM:  s/p trach   - CPAP trials   - vent bundle  - chest PT  - RCU following, plan for txer  - PNA management as below       CV:  Afib RVR on admission  - SBP goal 100-160    RENAL:  Baseline Cr 1.24, currently 1.4  Na goal >145  IVL  - trend lytes    GI:  s/p PEG 11/20  - Diet: TF   - Bowel regimen   -LBM 11/21    ENDO:   A1c 6.4%  - FS goal 120-180  - HISS  - NPH 9q6h      HEME/ONC:  LED negative 11/8, 11/15 left soilel DVT repeat LE doppler today to check for propagation   - no SCDs d/t b/l DVTs  - Chemoppx: heparin SQ  - repeat LED 11/26    ID:  cefepime end date 11/21 (7 days) for enterobacter PNA     ASSESSMENT: 60 yo man hx of HTN, DM, admitted 11/4 with HA/N/V followed by unresponsiveness, CT head with posterior fossa hemorrhage c/b severe IVH with casting of the 4th and 3rd ventricles with hydrocephalus, s/p EVD and SOC, 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 EVD clamped & removed       NEURO:  ICH score: 4  - neuro checks q4  - pain control  s/p EVD placement for hygroma 11/23  - plan for repeat angio at some point      PULM:  s/p trach   - CPAP trials   - trach collar   - chest PT  - RCU following, plan for txer  - PNA management as below       CV:  Afib RVR on admission  - SBP goal 100-160  -labeatolol 100mg tid    RENAL:  Baseline Cr 1.24, currently 1.4  Na goal >145  IVL  - trend lytes    GI:  s/p PEG 11/20  - Diet: TF   - Bowel regimen   -LBM 11/21    ENDO:   A1c 6.4%  - FS goal 120-180  - HISS  - NPH 9q6h      HEME/ONC:  LED negative 11/8, 11/15 left soilel DVT repeat LE doppler today to check for propagation   - no SCDs d/t b/l DVTs  - Chemoppx: heparin SQ  - repeat LED 11/26    ID:  [email or web address] end date 11/21 (7 days) for enterobacter PNA  obtain blood culture, UA, combicath, obtain procal, CXR

## 2021-11-24 NOTE — PROGRESS NOTE ADULT - ASSESSMENT
ALICIA BARBOUR is a 61M pmhx HTN, DM at work complaining of HA ~8:30, starting feeling dizzy and vomiting, HTN/keke when EMS got to him. SBP 220s, HR 50s. CTH shows large posterior fossa bleed w/ IVH/SAH/hydro.     - care per [facility] ICU  - No additional measures by General Surgery   - Please call with any additional questions    Acute Care Surgery   [id number]

## 2021-11-25 PROCEDURE — 71045 X-RAY EXAM CHEST 1 VIEW: CPT | Mod: 26

## 2021-11-25 PROCEDURE — 99233 SBSQ HOSP IP/OBS HIGH 50: CPT

## 2021-11-25 RX ORDER — DOXAZOSIN MESYLATE 4 MG
4 TABLET ORAL AT BEDTIME
Refills: 0 | Status: DISCONTINUED | OUTPATIENT
Start: 2021-11-25 | End: 2021-11-27

## 2021-11-25 RX ADMIN — Medication 1 DROP(S): at 11:55

## 2021-11-25 RX ADMIN — Medication 100 MILLIGRAM(S): at 05:48

## 2021-11-25 RX ADMIN — HUMAN INSULIN 9 UNIT(S): 100 INJECTION, SUSPENSION SUBCUTANEOUS at 23:30

## 2021-11-25 RX ADMIN — Medication 4: at 23:31

## 2021-11-25 RX ADMIN — Medication 100 MILLIGRAM(S): at 23:13

## 2021-11-25 RX ADMIN — HUMAN INSULIN 9 UNIT(S): 100 INJECTION, SUSPENSION SUBCUTANEOUS at 12:30

## 2021-11-25 RX ADMIN — HEPARIN SODIUM 5000 UNIT(S): 5000 INJECTION INTRAVENOUS; SUBCUTANEOUS at 05:45

## 2021-11-25 RX ADMIN — Medication 2: at 20:18

## 2021-11-25 RX ADMIN — Medication 4 MILLIGRAM(S): at 23:14

## 2021-11-25 RX ADMIN — HEPARIN SODIUM 5000 UNIT(S): 5000 INJECTION INTRAVENOUS; SUBCUTANEOUS at 23:14

## 2021-11-25 RX ADMIN — Medication 650 MILLIGRAM(S): at 05:50

## 2021-11-25 RX ADMIN — Medication 2: at 12:29

## 2021-11-25 RX ADMIN — HEPARIN SODIUM 5000 UNIT(S): 5000 INJECTION INTRAVENOUS; SUBCUTANEOUS at 13:14

## 2021-11-25 RX ADMIN — SODIUM CHLORIDE 3 MILLILITER(S): 9 INJECTION INTRAMUSCULAR; INTRAVENOUS; SUBCUTANEOUS at 05:53

## 2021-11-25 RX ADMIN — SODIUM CHLORIDE 3 MILLILITER(S): 9 INJECTION INTRAMUSCULAR; INTRAVENOUS; SUBCUTANEOUS at 23:30

## 2021-11-25 RX ADMIN — Medication 1 DROP(S): at 23:12

## 2021-11-25 RX ADMIN — Medication 650 MILLIGRAM(S): at 00:24

## 2021-11-25 RX ADMIN — ATORVASTATIN CALCIUM 40 MILLIGRAM(S): 80 TABLET, FILM COATED ORAL at 23:14

## 2021-11-25 RX ADMIN — Medication 1 DROP(S): at 05:42

## 2021-11-25 RX ADMIN — Medication 2: at 05:46

## 2021-11-25 RX ADMIN — HUMAN INSULIN 9 UNIT(S): 100 INJECTION, SUSPENSION SUBCUTANEOUS at 05:45

## 2021-11-25 RX ADMIN — SODIUM CHLORIDE 3 MILLILITER(S): 9 INJECTION INTRAMUSCULAR; INTRAVENOUS; SUBCUTANEOUS at 11:46

## 2021-11-25 RX ADMIN — Medication 650 MILLIGRAM(S): at 20:19

## 2021-11-25 RX ADMIN — CHLORHEXIDINE GLUCONATE 15 MILLILITER(S): 213 SOLUTION TOPICAL at 05:45

## 2021-11-25 NOTE — PROGRESS NOTE ADULT - ASSESSMENT
OT 6D: Cancel - OT consult received. Pt sleeping upon attempt, unable to check back at a later time.    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 EVD clamped & removed       NEURO:  ICH score: 4  - neuro checks q4  - pain control  s/p EVD placement for hygroma 11/23  - plan for repeat angio at some point      PULM:  s/p trach   - CPAP trials   - trach collar   - chest PT  - RCU following, plan for txer  - PNA management as below       CV:  Afib RVR on admission  - SBP goal 100-160  -labeatolol 100mg tid    RENAL:  Baseline Cr 1.24, currently 1.4  Na goal >145  IVL  - trend lytes    GI:  s/p PEG 11/20  - Diet: TF   - Bowel regimen   -LBM 11/21    ENDO:   A1c 6.4%  - FS goal 120-180  - HISS  - NPH 9q6h      HEME/ONC:  LED negative 11/8, 11/15 left soilel DVT repeat LE doppler today to check for propagation   - no SCDs d/t b/l DVTs  - Chemoppx: heparin SQ  - repeat LED 11/26    ID:  cefepime end date 11/21 (7 days) for enterobacter PNA  obtain blood culture, UA, combicath, obtain procal, CXR     ASSESSMENT: 60 yo man hx of HTN, DM, admitted 11/4 with HA/N/V followed by unresponsiveness, CT head with posterior fossa hemorrhage c/b severe IVH with casting of the 4th and 3rd ventricles with hydrocephalus, s/p EVD and SOC, 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 EVD clamped & removed       NEURO:  ICH score: 4  - neuro checks q4  - pain control  s/p EVD placement for hygroma 11/23  - plan for repeat angio at some point      PULM:  s/p trach   - CPAP trials   - trach collar   - chest PT  - RCU following, plan for txer  - PNA management as below '  - decreasing suction requirements   -obtain CXR       CV:  Afib RVR on admission  - SBP goal 100-160  -labeatolol 100mg tid    RENAL:  Baseline Cr 1.24, currently 1.4  Na goal >145  IVL  - trend lytes    GI:  s/p PEG 11/20  - Diet: TF   - Bowel regimen   -LBM 11/21    ENDO:   A1c 6.4%  - FS goal 120-180  - HISS  - NPH 9q6h      HEME/ONC:  LED negative 11/8, 11/15 left soilel DVT repeat LE doppler today to check for propagation   - no SCDs d/t b/l DVTs  - Chemoppx: heparin SQ  - repeat LED 11/26    ID:  cefepime end date 11/21 (7 days) for enterobacter PNA  obtain blood culture, UA, combicath gram +/- rods, procal, CXR

## 2021-11-25 NOTE — PROGRESS NOTE ADULT - ASSESSMENT
61 year old man with respiratory failure d/t ICH    1. ICH  -per neurosurgery, s/p EVD  -for  shunt    2. Respiratory failure  -for tracheostomy, will require long term enteral nutrition  -s/p PEG, tolerating feeds  - aspiration precautions     3. Enterobacter PNA  -s/p course of antibiotics     4. afib with RVR    5. Anemia, no overt GI bleed. EGD unremarkable.  -trend CBC    The plan of care was discussed with the physician assistant and modifications were made to the notation where appropriate.   Differential diagnosis and plan of care discussed with patient after the evaluation  35 minutes spent on total encounter of which more than fifty percent of the encounter was spent counseling and/or coordinating care by the attending physician.    [facility]  Gastroenterology and Hepatology  [address] [place], [place]  Office: [phone]  Cell: [phone]

## 2021-11-25 NOTE — PROGRESS NOTE ADULT - SUBJECTIVE AND OBJECTIVE BOX
Patient seen and examined at bedside.    --Anticoagulation--  heparin   Injectable 5000 Unit(s) SubCutaneous every 8 hours    T(C): 38 (11-25-21 @ 04:00), Max: 39.1 (11-24-21 @ 15:00)  HR: 97 (11-25-21 @ 04:00) (75 - 97)  BP: --  RR: 18 (11-25-21 @ 04:00) (11 - 20)  SpO2: 99% (11-25-21 @ 04:00) (98% - 100%)  Wt(kg): --    Exam:    trached, MELVI, dysconjugate gaze, pupils 2mmNR b/l, +corneals, overbreathing, +cough/gag, RUE ext, LUE nothing, BLE TF

## 2021-11-25 NOTE — PROGRESS NOTE ADULT - SUBJECTIVE AND OBJECTIVE BOX
NSCU Progress Note    24 Hour Events/Subjective:  - febrile     IMAGING/DATA:   - Reviewed    ALLERGIES:   - penicillin (Other)    PHYSICAL EXAM:    General: trach to vent  CVS: RRR  Pulm: CTAB  GI: Soft, NTND  Extremities: No LE Edema  Neuro: trached, MELVI, dysconjugate gaze, pupils 2mmNR b/l, +corneals, overbreathing, +cough/gag, RUE ext, LUE nothing, BLE TF        ICU Vital Signs Last 24 Hrs  T(C): 38.2 (25 Nov 2021 06:00), Max: 39.1 (24 Nov 2021 15:00)  T(F): 100.8 (25 Nov 2021 06:00), Max: 102.3 (24 Nov 2021 15:00)  HR: 95 (25 Nov 2021 06:00) (75 - 103)  ABP: 119/59 (25 Nov 2021 06:00) (100/46 - 148/61)  ABP(mean): 80 (25 Nov 2021 06:00) (63 - 91)  RR: 17 (25 Nov 2021 06:00) (11 - 20)  SpO2: 99% (25 Nov 2021 06:00) (98% - 100%)      11-24-21 @ 07:01  -  11-25-21 @ 07:00  --------------------------------------------------------  IN: 2140 mL / OUT: 2900 mL / NET: -760 mL        Mode: VENT OFF    acetaminophen    Suspension .. 650 milliGRAM(s) Oral every 6 hours PRN  artificial  tears Solution 1 Drop(s) Both EYES every 6 hours  atorvastatin 40 milliGRAM(s) Oral at bedtime  chlorhexidine 0.12% Liquid 15 milliLiter(s) Oral Mucosa two times a day  chlorhexidine 4% Liquid 1 Application(s) Topical <User Schedule>  dextrose 50% Injectable 25 Gram(s) IV Push once  dextrose 50% Injectable 12.5 Gram(s) IV Push once  doxazosin 4 milliGRAM(s) Oral at bedtime  heparin   Injectable 5000 Unit(s) SubCutaneous every 8 hours  insulin lispro (ADMELOG) corrective regimen sliding scale   SubCutaneous every 6 hours  insulin NPH human recombinant 9 Unit(s) SubCutaneous every 6 hours  labetalol 100 milliGRAM(s) Oral every 8 hours  levoFLOXacin IVPB      sodium chloride 3%  Inhalation 3 milliLiter(s) Inhalation every 6 hours      LABS:  Na: 139 (11-24 @ 21:02), 137 (11-24 @ 01:19), 141 (11-23 @ 20:20), 139 (11-22 @ 20:44)  K: 4.2 (11-24 @ 21:02), 4.6 (11-24 @ 01:19), 4.6 (11-23 @ 20:20), 4.5 (11-22 @ 20:44)  Cl: 102 (11-24 @ 21:02), 103 (11-24 @ 01:19), 104 (11-23 @ 20:20), 103 (11-22 @ 20:44)  CO2: 26 (11-24 @ 21:02), 24 (11-24 @ 01:19), 26 (11-23 @ 20:20), 27 (11-22 @ 20:44)  BUN: 29 (11-24 @ 21:02), 27 (11-24 @ 01:19), 27 (11-23 @ 20:20), 31 (11-22 @ 20:44)  Cr: 0.99 (11-24 @ 21:02), 0.96 (11-24 @ 01:19), 0.94 (11-23 @ 20:20), 1.06 (11-22 @ 20:44)  Glu: 175(11-24 @ 21:02), 171(11-24 @ 01:19), 181(11-23 @ 20:20), 186(11-22 @ 20:44)    Hgb: 7.5 (11-24 @ 21:02), 8.0 (11-24 @ 01:19), 7.8 (11-23 @ 20:20), 7.6 (11-22 @ 20:44)  Hct: 23.7 (11-24 @ 21:02), 25.2 (11-24 @ 01:19), 25.3 (11-23 @ 20:20), 24.2 (11-22 @ 20:44)  WBC: 12.20 (11-24 @ 21:02), 11.36 (11-24 @ 01:19), 9.41 (11-23 @ 20:20), 9.57 (11-22 @ 20:44)  Plt: 410 (11-24 @ 21:02), 470 (11-24 @ 01:19), 456 (11-23 @ 20:20), 432 (11-22 @ 20:44)    INR: 1.11 11-24-21 @ 01:19, 1.11 11-22-21 @ 18:00  PTT: 29.6 11-24-21 @ 01:19, 30.8 11-22-21 @ 18:00     NSCU Progress Note    24 Hour Events/Subjective:  - low grade fever     IMAGING/DATA:   - Reviewed    ALLERGIES:   - penicillin (Other)    PHYSICAL EXAM:    General: trach collar   CVS: RRR  Pulm: CTAB  GI: Soft, NTND  Extremities: No LE Edema  Neuro: trached, MELVI, dysconjugate gaze, pupils 2mmNR b/l, +corneals, overbreathing, +cough/gag, RUE ext, LUE nothing, BLE TF        ICU Vital Signs Last 24 Hrs  T(C): 38.2 (25 Nov 2021 06:00), Max: 39.1 (24 Nov 2021 15:00)  T(F): 100.8 (25 Nov 2021 06:00), Max: 102.3 (24 Nov 2021 15:00)  HR: 95 (25 Nov 2021 06:00) (75 - 103)  ABP: 119/59 (25 Nov 2021 06:00) (100/46 - 148/61)  ABP(mean): 80 (25 Nov 2021 06:00) (63 - 91)  RR: 17 (25 Nov 2021 06:00) (11 - 20)  SpO2: 99% (25 Nov 2021 06:00) (98% - 100%)      11-24-21 @ 07:01  -  11-25-21 @ 07:00  --------------------------------------------------------  IN: 2140 mL / OUT: 2900 mL / NET: -760 mL        Mode: VENT OFF    acetaminophen    Suspension .. 650 milliGRAM(s) Oral every 6 hours PRN  artificial  tears Solution 1 Drop(s) Both EYES every 6 hours  atorvastatin 40 milliGRAM(s) Oral at bedtime  chlorhexidine 0.12% Liquid 15 milliLiter(s) Oral Mucosa two times a day  chlorhexidine 4% Liquid 1 Application(s) Topical <User Schedule>  dextrose 50% Injectable 25 Gram(s) IV Push once  dextrose 50% Injectable 12.5 Gram(s) IV Push once  doxazosin 4 milliGRAM(s) Oral at bedtime  heparin   Injectable 5000 Unit(s) SubCutaneous every 8 hours  insulin lispro (ADMELOG) corrective regimen sliding scale   SubCutaneous every 6 hours  insulin NPH human recombinant 9 Unit(s) SubCutaneous every 6 hours  labetalol 100 milliGRAM(s) Oral every 8 hours  levoFLOXacin IVPB      sodium chloride 3%  Inhalation 3 milliLiter(s) Inhalation every 6 hours      LABS:  Na: 139 (11-24 @ 21:02), 137 (11-24 @ 01:19), 141 (11-23 @ 20:20), 139 (11-22 @ 20:44)  K: 4.2 (11-24 @ 21:02), 4.6 (11-24 @ 01:19), 4.6 (11-23 @ 20:20), 4.5 (11-22 @ 20:44)  Cl: 102 (11-24 @ 21:02), 103 (11-24 @ 01:19), 104 (11-23 @ 20:20), 103 (11-22 @ 20:44)  CO2: 26 (11-24 @ 21:02), 24 (11-24 @ 01:19), 26 (11-23 @ 20:20), 27 (11-22 @ 20:44)  BUN: 29 (11-24 @ 21:02), 27 (11-24 @ 01:19), 27 (11-23 @ 20:20), 31 (11-22 @ 20:44)  Cr: 0.99 (11-24 @ 21:02), 0.96 (11-24 @ 01:19), 0.94 (11-23 @ 20:20), 1.06 (11-22 @ 20:44)  Glu: 175(11-24 @ 21:02), 171(11-24 @ 01:19), 181(11-23 @ 20:20), 186(11-22 @ 20:44)    Hgb: 7.5 (11-24 @ 21:02), 8.0 (11-24 @ 01:19), 7.8 (11-23 @ 20:20), 7.6 (11-22 @ 20:44)  Hct: 23.7 (11-24 @ 21:02), 25.2 (11-24 @ 01:19), 25.3 (11-23 @ 20:20), 24.2 (11-22 @ 20:44)  WBC: 12.20 (11-24 @ 21:02), 11.36 (11-24 @ 01:19), 9.41 (11-23 @ 20:20), 9.57 (11-22 @ 20:44)  Plt: 410 (11-24 @ 21:02), 470 (11-24 @ 01:19), 456 (11-23 @ 20:20), 432 (11-22 @ 20:44)    INR: 1.11 11-24-21 @ 01:19, 1.11 11-22-21 @ 18:00  PTT: 29.6 11-24-21 @ 01:19, 30.8 11-22-21 @ 18:00

## 2021-11-25 NOTE — PROGRESS NOTE ADULT - ASSESSMENT
ANGLE ALICIA  61M pmhx HTN, DM at work complaining of HA ~8:30, starting feeling dizzy and vomiting, HTN/keek when EMS got to him. SBP 220s, HR 50s. CTH shows large posterior fossa bleed w/ IVH/SAH/hydro.   -s/p VPS  -RCU xfer

## 2021-11-26 DIAGNOSIS — R50.9 FEVER, UNSPECIFIED: ICD-10-CM

## 2021-11-26 DIAGNOSIS — I82.409 ACUTE EMBOLISM AND THROMBOSIS OF UNSPECIFIED DEEP VEINS OF UNSPECIFIED LOWER EXTREMITY: ICD-10-CM

## 2021-11-26 DIAGNOSIS — R13.10 DYSPHAGIA, UNSPECIFIED: ICD-10-CM

## 2021-11-26 DIAGNOSIS — Z29.9 ENCOUNTER FOR PROPHYLACTIC MEASURES, UNSPECIFIED: ICD-10-CM

## 2021-11-26 DIAGNOSIS — I25.10 ATHEROSCLEROTIC HEART DISEASE OF NATIVE CORONARY ARTERY WITHOUT ANGINA PECTORIS: ICD-10-CM

## 2021-11-26 DIAGNOSIS — E11.9 TYPE 2 DIABETES MELLITUS WITHOUT COMPLICATIONS: ICD-10-CM

## 2021-11-26 DIAGNOSIS — J15.6 PNEUMONIA DUE TO OTHER GRAM-NEGATIVE BACTERIA: ICD-10-CM

## 2021-11-26 LAB
-  AMIKACIN: SIGNIFICANT CHANGE UP
-  AZTREONAM: SIGNIFICANT CHANGE UP
-  CEFEPIME: SIGNIFICANT CHANGE UP
-  CEFTAZIDIME: SIGNIFICANT CHANGE UP
-  CIPROFLOXACIN: SIGNIFICANT CHANGE UP
-  GENTAMICIN: SIGNIFICANT CHANGE UP
-  IMIPENEM: SIGNIFICANT CHANGE UP
-  LEVOFLOXACIN: SIGNIFICANT CHANGE UP
-  MEROPENEM: SIGNIFICANT CHANGE UP
-  PIPERACILLIN/TAZOBACTAM: SIGNIFICANT CHANGE UP
-  TOBRAMYCIN: SIGNIFICANT CHANGE UP
ANION GAP SERPL CALC-SCNC: 11 MMOL/L — SIGNIFICANT CHANGE UP (ref 5–17)
APPEARANCE UR: CLEAR — SIGNIFICANT CHANGE UP
BILIRUB UR-MCNC: NEGATIVE — SIGNIFICANT CHANGE UP
BUN SERPL-MCNC: 24 MG/DL — HIGH (ref 7–23)
C DIFF GDH STL QL: SIGNIFICANT CHANGE UP
C DIFF GDH STL QL: SIGNIFICANT CHANGE UP
CALCIUM SERPL-MCNC: 8.3 MG/DL — LOW (ref 8.4–10.5)
CHLORIDE SERPL-SCNC: 100 MMOL/L — SIGNIFICANT CHANGE UP (ref 96–108)
CO2 SERPL-SCNC: 26 MMOL/L — SIGNIFICANT CHANGE UP (ref 22–31)
COLOR SPEC: SIGNIFICANT CHANGE UP
CREAT SERPL-MCNC: 1.05 MG/DL — SIGNIFICANT CHANGE UP (ref 0.5–1.3)
CULTURE RESULTS: SIGNIFICANT CHANGE UP
DIFF PNL FLD: ABNORMAL
GLUCOSE SERPL-MCNC: 173 MG/DL — HIGH (ref 70–99)
GLUCOSE UR QL: ABNORMAL
HCT VFR BLD CALC: 25.9 % — LOW (ref 39–50)
HGB BLD-MCNC: 8.1 G/DL — LOW (ref 13–17)
KETONES UR-MCNC: NEGATIVE — SIGNIFICANT CHANGE UP
LEUKOCYTE ESTERASE UR-ACNC: NEGATIVE — SIGNIFICANT CHANGE UP
MCHC RBC-ENTMCNC: 29.7 PG — SIGNIFICANT CHANGE UP (ref 27–34)
MCHC RBC-ENTMCNC: 31.3 GM/DL — LOW (ref 32–36)
MCV RBC AUTO: 94.9 FL — SIGNIFICANT CHANGE UP (ref 80–100)
METHOD TYPE: SIGNIFICANT CHANGE UP
NITRITE UR-MCNC: NEGATIVE — SIGNIFICANT CHANGE UP
NRBC # BLD: 0 /100 WBCS — SIGNIFICANT CHANGE UP (ref 0–0)
ORGANISM # SPEC MICROSCOPIC CNT: SIGNIFICANT CHANGE UP
ORGANISM # SPEC MICROSCOPIC CNT: SIGNIFICANT CHANGE UP
PH UR: 6.5 — SIGNIFICANT CHANGE UP (ref 5–8)
PLATELET # BLD AUTO: 395 K/UL — SIGNIFICANT CHANGE UP (ref 150–400)
POTASSIUM SERPL-MCNC: 4.5 MMOL/L — SIGNIFICANT CHANGE UP (ref 3.5–5.3)
POTASSIUM SERPL-SCNC: 4.5 MMOL/L — SIGNIFICANT CHANGE UP (ref 3.5–5.3)
PROT UR-MCNC: 100 — SIGNIFICANT CHANGE UP
RAPID RVP RESULT: SIGNIFICANT CHANGE UP
RBC # BLD: 2.73 M/UL — LOW (ref 4.2–5.8)
RBC # FLD: 13.3 % — SIGNIFICANT CHANGE UP (ref 10.3–14.5)
SARS-COV-2 RNA SPEC QL NAA+PROBE: SIGNIFICANT CHANGE UP
SODIUM SERPL-SCNC: 137 MMOL/L — SIGNIFICANT CHANGE UP (ref 135–145)
SP GR SPEC: 1.02 — SIGNIFICANT CHANGE UP (ref 1.01–1.02)
SPECIMEN SOURCE: SIGNIFICANT CHANGE UP
UROBILINOGEN FLD QL: NEGATIVE — SIGNIFICANT CHANGE UP
WBC # BLD: 10.08 K/UL — SIGNIFICANT CHANGE UP (ref 3.8–10.5)
WBC # FLD AUTO: 10.08 K/UL — SIGNIFICANT CHANGE UP (ref 3.8–10.5)

## 2021-11-26 PROCEDURE — 70450 CT HEAD/BRAIN W/O DYE: CPT | Mod: 26,59

## 2021-11-26 PROCEDURE — 99223 1ST HOSP IP/OBS HIGH 75: CPT

## 2021-11-26 PROCEDURE — 71045 X-RAY EXAM CHEST 1 VIEW: CPT | Mod: 26

## 2021-11-26 RX ORDER — VANCOMYCIN HCL 1 G
125 VIAL (EA) INTRAVENOUS EVERY 6 HOURS
Refills: 0 | Status: DISCONTINUED | OUTPATIENT
Start: 2021-11-26 | End: 2021-12-09

## 2021-11-26 RX ADMIN — Medication 2: at 05:41

## 2021-11-26 RX ADMIN — Medication 1 DROP(S): at 05:29

## 2021-11-26 RX ADMIN — Medication 4: at 18:16

## 2021-11-26 RX ADMIN — Medication 4: at 11:31

## 2021-11-26 RX ADMIN — Medication 125 MILLIGRAM(S): at 18:15

## 2021-11-26 RX ADMIN — Medication 650 MILLIGRAM(S): at 07:28

## 2021-11-26 RX ADMIN — Medication 1 DROP(S): at 18:16

## 2021-11-26 RX ADMIN — CHLORHEXIDINE GLUCONATE 15 MILLILITER(S): 213 SOLUTION TOPICAL at 18:16

## 2021-11-26 RX ADMIN — HUMAN INSULIN 9 UNIT(S): 100 INJECTION, SUSPENSION SUBCUTANEOUS at 18:15

## 2021-11-26 RX ADMIN — SODIUM CHLORIDE 3 MILLILITER(S): 9 INJECTION INTRAMUSCULAR; INTRAVENOUS; SUBCUTANEOUS at 18:12

## 2021-11-26 RX ADMIN — Medication 1 DROP(S): at 11:31

## 2021-11-26 RX ADMIN — HEPARIN SODIUM 5000 UNIT(S): 5000 INJECTION INTRAVENOUS; SUBCUTANEOUS at 05:42

## 2021-11-26 RX ADMIN — HUMAN INSULIN 9 UNIT(S): 100 INJECTION, SUSPENSION SUBCUTANEOUS at 11:32

## 2021-11-26 RX ADMIN — SODIUM CHLORIDE 3 MILLILITER(S): 9 INJECTION INTRAMUSCULAR; INTRAVENOUS; SUBCUTANEOUS at 05:29

## 2021-11-26 RX ADMIN — HUMAN INSULIN 9 UNIT(S): 100 INJECTION, SUSPENSION SUBCUTANEOUS at 05:31

## 2021-11-26 RX ADMIN — Medication 650 MILLIGRAM(S): at 16:03

## 2021-11-26 RX ADMIN — Medication 100 MILLIGRAM(S): at 22:01

## 2021-11-26 RX ADMIN — CHLORHEXIDINE GLUCONATE 15 MILLILITER(S): 213 SOLUTION TOPICAL at 05:42

## 2021-11-26 RX ADMIN — Medication 100 MILLIGRAM(S): at 05:30

## 2021-11-26 RX ADMIN — ATORVASTATIN CALCIUM 40 MILLIGRAM(S): 80 TABLET, FILM COATED ORAL at 22:01

## 2021-11-26 RX ADMIN — SODIUM CHLORIDE 3 MILLILITER(S): 9 INJECTION INTRAMUSCULAR; INTRAVENOUS; SUBCUTANEOUS at 11:31

## 2021-11-26 RX ADMIN — Medication 650 MILLIGRAM(S): at 09:16

## 2021-11-26 RX ADMIN — Medication 650 MILLIGRAM(S): at 16:39

## 2021-11-26 RX ADMIN — Medication 100 MILLIGRAM(S): at 16:03

## 2021-11-26 RX ADMIN — HEPARIN SODIUM 5000 UNIT(S): 5000 INJECTION INTRAVENOUS; SUBCUTANEOUS at 16:04

## 2021-11-26 RX ADMIN — Medication 4 MILLIGRAM(S): at 22:01

## 2021-11-26 RX ADMIN — HEPARIN SODIUM 5000 UNIT(S): 5000 INJECTION INTRAVENOUS; SUBCUTANEOUS at 22:01

## 2021-11-26 NOTE — CONSULT NOTE ADULT - ASSESSMENT
60 yo male with PMH of HTN, CAD s/p stent on DAPT, T2DM who complained of headache and subsequently become unresponsive. 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 EVD clamped & removed. NSCU course also c/b respiratory failure and dysphagia now s/p trach and PEG on 11/19. S/p VPS placement on 11/23. Completed course of cefepime for enterobacter and pseudomonas pneumonia on 11/21. Transferred out of NSCU on 11/25. Began to spike fevers overnight with watery output from rectal tube. Medicine consulted for fever.         full note to follow.    start PO vanco for presumed c. diff  f/u c. diff PCR  needs aggressive suctioning and trach care  ID consult   60 yo male with PMH of HTN, CAD s/p stent on DAPT, T2DM who complained of headache and subsequently become unresponsive. 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 EVD clamped & removed. NSCU course also c/b respiratory failure and dysphagia now s/p trach and PEG on 11/19. S/p VPS placement on 11/23. Completed course of cefepime for enterobacter and pseudomonas pneumonia on 11/21. Transferred out of NSCU on 11/25. Began to spike fevers overnight with watery output from rectal tube. Medicine consulted for fever.

## 2021-11-26 NOTE — CONSULT NOTE ADULT - PROBLEM SELECTOR RECOMMENDATION 9
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 EVD clamped & removed.  - care per neurosurgery

## 2021-11-26 NOTE — PROGRESS NOTE ADULT - SUBJECTIVE AND OBJECTIVE BOX
SUBJECTIVE: Patient seen and examined.  Spiked a fever this AM     Vital Signs Last 24 Hrs  T(C): 38.4 (26 Nov 2021 08:02), Max: 38.4 (26 Nov 2021 08:02)  T(F): 101.2 (26 Nov 2021 08:02), Max: 101.2 (26 Nov 2021 08:02)  HR: 100 (26 Nov 2021 08:02) (84 - 103)  BP: 139/79 (26 Nov 2021 08:02) (114/66 - 148/80)  BP(mean): 81 (25 Nov 2021 15:00) (81 - 81)  RR: 18 (26 Nov 2021 08:02) (12 - 18)  SpO2: 98% (26 Nov 2021 08:02) (96% - 100%)    PHYSICAL EXAM:    Constitutional: No Acute Distress     Neurological: trached, peged, opens eyes to noxious, dysconjugate gaze, no following commands rue extension, lue 0/5 b/l tf     Pulmonary: Clear to Auscultation, No rales, No rhonchi, No wheezes     Cardiovascular: S1, S2, Regular rate and rhythm     Gastrointestinal: Soft, Non-tender, Non-distended     Extremities: No calf tenderness     Incision: c/d/i  LABS:                        8.1    10.08 )-----------( 395      ( 26 Nov 2021 05:22 )             25.9    11-26    137  |  100  |  24<H>  ----------------------------<  173<H>  4.5   |  26  |  1.05    Ca    8.3<L>      26 Nov 2021 05:22  Phos  3.4     11-24  Mg     2.1     11-24 11-25 @ 07:01  -  11-26 @ 07:00  --------------------------------------------------------  IN: 1930 mL / OUT: 2000 mL / NET: -70 mL        MEDICATIONS:  Anticoagulation:   heparin   Injectable 5000 Unit(s) SubCutaneous every 8 hours    Antibiotics:  levoFLOXacin IVPB 750 milliGRAM(s) IV Intermittent every 24 hours  levoFLOXacin IVPB        Endo:  atorvastatin 40 milliGRAM(s) Oral at bedtime  dextrose 50% Injectable 25 Gram(s) IV Push once  dextrose 50% Injectable 12.5 Gram(s) IV Push once  insulin lispro (ADMELOG) corrective regimen sliding scale   SubCutaneous every 6 hours  insulin NPH human recombinant 9 Unit(s) SubCutaneous every 6 hours    Neuro:  acetaminophen    Suspension .. 650 milliGRAM(s) Oral every 6 hours PRN Temp greater or equal to 38C (100.4F), Mild Pain (1 - 3)    Cardiac:  doxazosin 4 milliGRAM(s) Oral at bedtime  labetalol 100 milliGRAM(s) Oral every 8 hours    Pulm:  sodium chloride 3%  Inhalation 3 milliLiter(s) Inhalation every 6 hours    GI/:    Other:   artificial  tears Solution 1 Drop(s) Both EYES every 6 hours  chlorhexidine 0.12% Liquid 15 milliLiter(s) Oral Mucosa two times a day  chlorhexidine 4% Liquid 1 Application(s) Topical <User Schedule>    DIET: peg tube feeds glucerna @ 80     IMAGING:

## 2021-11-26 NOTE — CONSULT NOTE ADULT - PROBLEM SELECTOR RECOMMENDATION 3
PPSV 10%  requires assistance with all ADLs
sputum culture grew enterobacter and most recently pseudomonas.  - s/p 7 day course of cefepime on 11/21  - was started on levofloxacin for 11/24 sputum cx with pseudomonas  - ID consult appreciated  - levofloxacin held as per ID

## 2021-11-26 NOTE — CONSULT NOTE ADULT - PROBLEM SELECTOR RECOMMENDATION 5
Palliative team will continue to follow for ongoing goals of care.    Brianna Jacobo MD  Palliative Medicine Attending   [facility] Pager [phone]
right soleal vein DVT and persistent left posterior tibial, peroneal, gastrocnemius and soleal vein DVT without proximal propagation on last duplex on 11/24  - c/w serial duplex to monitor for propagation  - on hep subc ppx dosing

## 2021-11-26 NOTE — CONSULT NOTE ADULT - NSCONSULTADDITIONALINFOA_GEN_ALL_CORE
.  Gaby Gaitan MD  Division of Hospital Medicine  [facility]   Spectra: [phone]    Plan discussed with patient and neurosurgery QASIM Hayes.

## 2021-11-26 NOTE — CONSULT NOTE ADULT - PROBLEM SELECTOR RECOMMENDATION 2
management per primary team
persistent fevers. suspect 2/2 to c. diff   - UA negative  - CXR with clear lungs  - rectal tube with loose/watery output  - c. diff indeterminate, f/u PCR - pending  - would start empiric PO vanco 125mg PO q6h for now while awaiting PCR  - added FWB to offset diarrheal losses  - ID consult appreciated  - levofloxacin on hold given presumed c. diff infection

## 2021-11-26 NOTE — PROGRESS NOTE ADULT - ASSESSMENT
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 EVD clamped & removed       NEURO:  ICH score: 4  - neuro checks q4 vital checks q 4   - pain control with tylenol   - vps certa @ 4.    - plan for repeat angio on Tuesday       PULM:  s/p trach 11/19  - trach collar   - chest PT  - PNA management on levaquin      CV:  Afib RVR on admission  - SBP goal 100-160  -labeatolol 100mg tid  - continue for hyperlipidemia     RENAL:  Baseline Cr 1.24, currently 1.05  IVL  - trend lytes    GI:  s/p PEG 11/20  - Diet: TF   - Bowel regimen stopped, he has a rectal tube       ENDO:   A1c 6.4%  - FS goal 120-180  - HISS  - NPH 9q6h      HEME/ONC:  LED negative 11/8, 11/15 left soilel DVT,  11/24 no propogation   - no SCDs d/t b/l DVTs  - Chemoppx: heparin SQ    ID:  cefepime end date 11/21 (7 days) for pseudomonos PNA  obtain blood culture, UA, combicath gram +/- rods, procal, CXR    PT/OT CHUYITA

## 2021-11-26 NOTE — CONSULT NOTE ADULT - SUBJECTIVE AND OBJECTIVE BOX
full note to follow.    start PO vanco for presumed c. diff  ID consult full note to follow.    start PO vanco for presumed c. diff  f/u c. diff PCR  needs aggressive suctioning and trach care  ID consult [facility] Division of Hospital Medicine  Gaby Gaitan MD  Pager (KIMBERLY, 7J-0Q): [phone]  Other Times:  [phone]    HPI:  62 yo male with PMH of HTN, CAD s/p stent on DAPT, T2DM who complained of headache at work at approximately ~8:30, starting feeling dizzy and vomiting, HTN/keke when EMS got to him. SBP 220s, HR 50s. On EMS arrival at 09:39AM 21, patient seen talking a little, garbling, moving all extremities, then quickly became unresponsive. No known blood thinners. CTH showed large posterior fossa bleed w/ IVH/SAH/hydro. Pre/intubation exam: no eyes open, pupils 2b/l, + corneals/cough/gag, not FC, LUE spont fingers moving, flaccid otherwise. 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 EVD clamped and removed. NSCU course complicated by respiratory failure and dysphagia now s/p trach and PEG on . S/p VPS placement on . Completed course of cefepime for enterobacter and pseudomonas pneumonia on . Transferred out of NSCU on . Began to spike fevers overnight with watery output from rectal tube. Medicine consulted for fever.     Interval History: febrile to 101. rectal tube with watery ouput - no formed stool. unable to obtain ROS as patient not verbal.     PAST MEDICAL & SURGICAL HISTORY:  HTN (hypertension)  Diabetes mellitus  CAD s/p stent  HLD    Home Medications reviewed [x]:  aspirin 81 mg oral tablet: 1 tab(s) orally once a day (2021 15:57)  atorvastatin 80 mg oral tablet: 1 tab(s) orally once a day (at bedtime) (2021 15:57)  benazepril 20 mg oral tablet: 1 tab(s) orally once a day (2021 15:57)  chlorthalidone 25 mg oral tablet: 1 tab(s) orally once a day (2021 15:57)  metFORMIN 1000 mg oral tablet: 1 tab(s) orally 2 times a day (2021 15:57)  metoprolol succinate 25 mg oral tablet, extended release: 1 tab(s) orally once a day (2021 15:57)  pioglitazone 30 mg oral tablet: 1 tab(s) orally once a day (2021 15:57)  Plavix 75 mg oral tablet: 1 tab(s) orally once a day (2021 15:57)  Trulicity Pen 0.75 mg/0.5 mL subcutaneous solution: 1 application subcutaneous every 7 days (2021 15:57)        Review of Systems: Unable to obtain as patient not verbal      Allergies    penicillin (Other)    Intolerances        Social History: Unable to obtain as patient not verbal    FAMILY HISTORY: Unable to obtain as patient not verbal      MEDICATIONS  (STANDING):  artificial  tears Solution 1 Drop(s) Both EYES every 6 hours  atorvastatin 40 milliGRAM(s) Oral at bedtime  chlorhexidine 0.12% Liquid 15 milliLiter(s) Oral Mucosa two times a day  chlorhexidine 4% Liquid 1 Application(s) Topical <User Schedule>  dextrose 50% Injectable 25 Gram(s) IV Push once  dextrose 50% Injectable 12.5 Gram(s) IV Push once  doxazosin 4 milliGRAM(s) Oral at bedtime  heparin   Injectable 5000 Unit(s) SubCutaneous every 8 hours  insulin lispro (ADMELOG) corrective regimen sliding scale   SubCutaneous every 6 hours  insulin NPH human recombinant 9 Unit(s) SubCutaneous every 6 hours  labetalol 100 milliGRAM(s) Oral every 8 hours  sodium chloride 3%  Inhalation 3 milliLiter(s) Inhalation every 6 hours  vancomycin    Solution 125 milliGRAM(s) Oral every 6 hours    MEDICATIONS  (PRN):  acetaminophen    Suspension .. 650 milliGRAM(s) Oral every 6 hours PRN Temp greater or equal to 38C (100.4F), Mild Pain (1 - 3)        CAPILLARY BLOOD GLUCOSE      POCT Blood Glucose.: 201 mg/dL (2021 11:16)  POCT Blood Glucose.: 188 mg/dL (2021 05:27)  POCT Blood Glucose.: 204 mg/dL (2021 23:27)  POCT Blood Glucose.: 171 mg/dL (2021 20:18)  POCT Blood Glucose.: 152 mg/dL (2021 17:35)    I&O's Summary    2021 07:01  -  2021 07:00  --------------------------------------------------------  IN: 1930 mL / OUT: 2000 mL / NET: -70 mL    2021 07:01  -  2021 15:57  --------------------------------------------------------  IN: 0 mL / OUT: 600 mL / NET: -600 mL        Physical Exam:  Vital Signs Last 24 Hrs  T(C): 38.3 (2021 15:39), Max: 38.4 (2021 08:02)  T(F): 101 (2021 15:39), Max: 101.2 (2021 08:02)  HR: 103 (2021 15:39) (96 - 103)  BP: 157/81 (2021 15:39) (121/71 - 157/81)  BP(mean): --  RR: 18 (2021 15:39) (16 - 18)  SpO2: 99% (2021 15:39) (96% - 99%)    CONSTITUTIONAL: lethargic, not responsive  EYES: PERRLA; conjunctiva and sclera clear  ENMT: Moist oral mucosa, +trach collar with visible white/yellow secretions on his gown  NECK: Supple, no palpable masses; no thyromegaly  RESPIRATORY: Normal respiratory effort; lungs clear to auscultation b/l anteriorly  CARDIOVASCULAR: Regular rate and rhythm, normal S1 and S2, no murmur/rub/gallop; No lower extremity edema; Peripheral pulses are 2+ bilaterally  ABDOMEN: Soft, Nondistended,  Nontender to palpation, normoactive bowel sounds, +PEG tube,  +rectal tube with watery brown output  MUSCULOSKELETAL:  No clubbing or cyanosis of digits; no joint swelling or tenderness to palpation  PSYCH: A+O to person, place, and time; affect appropriate  NEUROLOGY: CN 2-12 are intact and symmetric; no gross sensory deficits   SKIN: incisions c/d/i    LABS:                        8.1    10.08 )-----------( 395      ( 2021 05:22 )             25.9     11-26    137  |  100  |  24<H>  ----------------------------<  173<H>  4.5   |  26  |  1.05    Ca    8.3<L>      2021 05:22  Phos  3.4     11-  Mg     2.1     -24      Urinalysis Basic - ( 2021 09:59 )    Color: Light Yellow / Appearance: Clear / S.019 / pH: x  Gluc: x / Ketone: Negative  / Bili: Negative / Urobili: Negative   Blood: x / Protein: 100 / Nitrite: Negative   Leuk Esterase: Negative / RBC: 6 /hpf / WBC 4 /HPF   Sq Epi: x / Non Sq Epi: 2 /hpf / Bacteria: Negative        Culture - Sputum (collected 2021 16:58)  Source: .Sputum Sputum  Gram Stain (2021 21:22):    Few Squamous epithelial cells per low power field    Moderate polymorphonuclear leukocytes per low power field    Moderate Gram positive cocci in pairs per oil power field    Few Gram Positive Rods per oil power field    Few Gram Negative Rods per oil power field  Preliminary Report (2021 22:17):    Moderate Pseudomonas aeruginosa    Normal Respiratory Susan present    Culture - Blood (collected 2021 16:56)  Source: .Blood Blood  Preliminary Report (2021 17:01):    No growth to date.    Culture - Blood (collected 2021 16:56)  Source: .Blood Blood  Preliminary Report (2021 17:01):    No growth to date.        RADIOLOGY & ADDITIONAL TESTS:  Results Reviewed: no leukocytosis, H/H stable, Cr stable,  sputum cx with pseudomonas  Imaging Personally Reviewed:  Electrocardiogram Personally Reviewed:    COORDINATION OF CARE:  Care Discussed with Consultants/Other Providers [Y]: Neurosurgery QASIM Hayes  Prior or Outpatient Records Reviewed [Y]: NSCU progress notes, ID consult note     [facility] Division of Hospital Medicine  Gaby Gaitan MD  Pager (KIMBERLY, 7X-7Q): [phone]  Other Times:  [phone]    HPI:  62 yo male with PMH of HTN, CAD s/p stent on DAPT, T2DM who complained of headache at work at approximately ~8:30, starting feeling dizzy and vomiting, HTN/keke when EMS got to him. SBP 220s, HR 50s. On EMS arrival at 09:39AM 21, patient seen talking a little, garbling, moving all extremities, then quickly became unresponsive. No known blood thinners. CTH showed large posterior fossa bleed w/ IVH/SAH/hydro. Pre/intubation exam: no eyes open, pupils 2b/l, + corneals/cough/gag, not FC, LUE spont fingers moving, flaccid otherwise. 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 EVD clamped and removed. NSCU course complicated by respiratory failure and dysphagia now s/p trach and PEG on . S/p VPS placement on . Completed course of cefepime for enterobacter and pseudomonas pneumonia on . Transferred out of NSCU on . Began to spike fevers overnight with watery output from rectal tube. Medicine consulted for fever.     Interval History: febrile to 101. rectal tube with watery ouput - no formed stool. unable to obtain ROS as patient not verbal.     PAST MEDICAL & SURGICAL HISTORY:  HTN (hypertension)  Diabetes mellitus  CAD s/p stent  HLD    Home Medications reviewed [x]:  aspirin 81 mg oral tablet: 1 tab(s) orally once a day (2021 15:57)  atorvastatin 80 mg oral tablet: 1 tab(s) orally once a day (at bedtime) (2021 15:57)  benazepril 20 mg oral tablet: 1 tab(s) orally once a day (2021 15:57)  chlorthalidone 25 mg oral tablet: 1 tab(s) orally once a day (2021 15:57)  metFORMIN 1000 mg oral tablet: 1 tab(s) orally 2 times a day (2021 15:57)  metoprolol succinate 25 mg oral tablet, extended release: 1 tab(s) orally once a day (2021 15:57)  pioglitazone 30 mg oral tablet: 1 tab(s) orally once a day (2021 15:57)  Plavix 75 mg oral tablet: 1 tab(s) orally once a day (2021 15:57)  Trulicity Pen 0.75 mg/0.5 mL subcutaneous solution: 1 application subcutaneous every 7 days (2021 15:57)        Review of Systems: Unable to obtain as patient not verbal      Allergies    penicillin (Other)    Intolerances        Social History: Unable to obtain as patient not verbal    FAMILY HISTORY: Unable to obtain as patient not verbal      MEDICATIONS  (STANDING):  artificial  tears Solution 1 Drop(s) Both EYES every 6 hours  atorvastatin 40 milliGRAM(s) Oral at bedtime  chlorhexidine 0.12% Liquid 15 milliLiter(s) Oral Mucosa two times a day  chlorhexidine 4% Liquid 1 Application(s) Topical <User Schedule>  dextrose 50% Injectable 25 Gram(s) IV Push once  dextrose 50% Injectable 12.5 Gram(s) IV Push once  doxazosin 4 milliGRAM(s) Oral at bedtime  heparin   Injectable 5000 Unit(s) SubCutaneous every 8 hours  insulin lispro (ADMELOG) corrective regimen sliding scale   SubCutaneous every 6 hours  insulin NPH human recombinant 9 Unit(s) SubCutaneous every 6 hours  labetalol 100 milliGRAM(s) Oral every 8 hours  sodium chloride 3%  Inhalation 3 milliLiter(s) Inhalation every 6 hours  vancomycin    Solution 125 milliGRAM(s) Oral every 6 hours    MEDICATIONS  (PRN):  acetaminophen    Suspension .. 650 milliGRAM(s) Oral every 6 hours PRN Temp greater or equal to 38C (100.4F), Mild Pain (1 - 3)        CAPILLARY BLOOD GLUCOSE      POCT Blood Glucose.: 201 mg/dL (2021 11:16)  POCT Blood Glucose.: 188 mg/dL (2021 05:27)  POCT Blood Glucose.: 204 mg/dL (2021 23:27)  POCT Blood Glucose.: 171 mg/dL (2021 20:18)  POCT Blood Glucose.: 152 mg/dL (2021 17:35)    I&O's Summary    2021 07:01  -  2021 07:00  --------------------------------------------------------  IN: 1930 mL / OUT: 2000 mL / NET: -70 mL    2021 07:01  -  2021 15:57  --------------------------------------------------------  IN: 0 mL / OUT: 600 mL / NET: -600 mL        Physical Exam:  Vital Signs Last 24 Hrs  T(C): 38.3 (2021 15:39), Max: 38.4 (2021 08:02)  T(F): 101 (2021 15:39), Max: 101.2 (2021 08:02)  HR: 103 (2021 15:39) (96 - 103)  BP: 157/81 (2021 15:39) (121/71 - 157/81)  BP(mean): --  RR: 18 (2021 15:39) (16 - 18)  SpO2: 99% (2021 15:39) (96% - 99%)    CONSTITUTIONAL: lethargic, not responsive  EYES: PERRLA; conjunctiva and sclera clear  ENMT: Moist oral mucosa, +trach collar with visible white/yellow secretions on his gown  NECK: Supple, no palpable masses; no thyromegaly  RESPIRATORY: Normal respiratory effort; lungs clear to auscultation b/l anteriorly  CARDIOVASCULAR: Regular rate and rhythm, normal S1 and S2, no murmur/rub/gallop; No lower extremity edema; Peripheral pulses are 2+ bilaterally  ABDOMEN: Soft, Nondistended,  Nontender to palpation, normoactive bowel sounds, +PEG tube,  +rectal tube with watery brown output  MUSCULOSKELETAL:  No clubbing or cyanosis of digits; no joint swelling or tenderness to palpation  PSYCH: lethargic, unable to assess orientation  NEUROLOGY: non-verbal, not following commands  SKIN: incisions c/d/i    LABS:                        8.1    10.08 )-----------( 395      ( 2021 05:22 )             25.9     11-26    137  |  100  |  24<H>  ----------------------------<  173<H>  4.5   |  26  |  1.05    Ca    8.3<L>      2021 05:22  Phos  3.4     11-  Mg     2.1     -24      Urinalysis Basic - ( 2021 09:59 )    Color: Light Yellow / Appearance: Clear / S.019 / pH: x  Gluc: x / Ketone: Negative  / Bili: Negative / Urobili: Negative   Blood: x / Protein: 100 / Nitrite: Negative   Leuk Esterase: Negative / RBC: 6 /hpf / WBC 4 /HPF   Sq Epi: x / Non Sq Epi: 2 /hpf / Bacteria: Negative        Culture - Sputum (collected 2021 16:58)  Source: .Sputum Sputum  Gram Stain (2021 21:22):    Few Squamous epithelial cells per low power field    Moderate polymorphonuclear leukocytes per low power field    Moderate Gram positive cocci in pairs per oil power field    Few Gram Positive Rods per oil power field    Few Gram Negative Rods per oil power field  Preliminary Report (2021 22:17):    Moderate Pseudomonas aeruginosa    Normal Respiratory Susan present    Culture - Blood (collected 2021 16:56)  Source: .Blood Blood  Preliminary Report (2021 17:01):    No growth to date.    Culture - Blood (collected 2021 16:56)  Source: .Blood Blood  Preliminary Report (2021 17:01):    No growth to date.        RADIOLOGY & ADDITIONAL TESTS:  Results Reviewed: no leukocytosis, H/H stable, Cr stable,  sputum cx with pseudomonas  Imaging Personally Reviewed:  Electrocardiogram Personally Reviewed:    COORDINATION OF CARE:  Care Discussed with Consultants/Other Providers [Y]: Neurosurgery QASIM Hayes  Prior or Outpatient Records Reviewed [Y]: NSCU progress notes, ID consult note

## 2021-11-26 NOTE — CONSULT NOTE ADULT - PROBLEM SELECTOR RECOMMENDATION 4
see above GOC note  Continue current interventions  plan for family meeting on Monday
s/p trach. on trach collar  - patient with significant secretions from trach at time of my exam  - needs aggressive trach care and suctioning  - monitor O2 sats

## 2021-11-26 NOTE — CONSULT NOTE ADULT - PROBLEM SELECTOR RECOMMENDATION 6
s/p stent. was on DAPT per prescription refills   - resume ASA when clear from neurosurgery standpoint  - c/w statin

## 2021-11-26 NOTE — CONSULT NOTE ADULT - SUBJECTIVE AND OBJECTIVE BOX
HPI:   Patient is a 61y male with hypertension who was admitted  with severe headache, vomiting , found to have sah with casting, underwent emergency suboccipital craniectomy and c spine lami, then on  underwent craniectomy again for pica aneurysm resection. On  he underwent peg and trach and on  underwent vps placement. He had a 7 days course of cefepime ending  for pneumonia. He has been with intermittent fever over the past several days and today he has watery diarrhea. Cdif is indeterminant, pcr pends. Patient cannot give info. He is off vent on trach collar. patient cannot give any info.  A prior sputum grew enterobacter, most recent with pseudomonas. HE has distal dvt .Patient is out of nscu on floor.     REVIEW OF SYSTEMS:  All other review of systems negative (Comprehensive ROS)    PAST MEDICAL & SURGICAL HISTORY:  HTN (hypertension)    Diabetes mellitus        Allergies    penicillin (Other)    Intolerances        Antimicrobials Day #    levoFLOXacin IVPB 750 milliGRAM(s) IV Intermittent every 24 hours day 2  levoFLOXacin IVPB      vancomycin    Solution 125 milliGRAM(s) Oral every 6 hours day 1    Other Medications:  acetaminophen    Suspension .. 650 milliGRAM(s) Oral every 6 hours PRN  artificial  tears Solution 1 Drop(s) Both EYES every 6 hours  atorvastatin 40 milliGRAM(s) Oral at bedtime  chlorhexidine 0.12% Liquid 15 milliLiter(s) Oral Mucosa two times a day  chlorhexidine 4% Liquid 1 Application(s) Topical <User Schedule>  dextrose 50% Injectable 25 Gram(s) IV Push once  dextrose 50% Injectable 12.5 Gram(s) IV Push once  doxazosin 4 milliGRAM(s) Oral at bedtime  heparin   Injectable 5000 Unit(s) SubCutaneous every 8 hours  insulin lispro (ADMELOG) corrective regimen sliding scale   SubCutaneous every 6 hours  insulin NPH human recombinant 9 Unit(s) SubCutaneous every 6 hours  labetalol 100 milliGRAM(s) Oral every 8 hours  sodium chloride 3%  Inhalation 3 milliLiter(s) Inhalation every 6 hours      FAMILY HISTORY:  cannot get    SOCIAL HISTORY:  Smoking: [ ]Yes [x ]No  ETOH: [ ]Yes [x ]No  Drug Use: [ ]Yes [ ]xNo   x[ ] Single[ ]    T(F): 99.7 (21 @ 11:35), Max: 101.2 (21 @ 08:02)  HR: 96 (21 @ 11:35)  BP: 135/80 (21 @ 11:35)  RR: 18 (21 @ 11:35)  SpO2: 99% (21 @ 11:35)  Wt(kg): --    PHYSICAL EXAM:  General: unresponsive , head wounds clean, no acute distress  Eyes:  anicteric, no conjunctival injection, no discharge  Oropharynx: no lesions or injection 	  Neck: supple, without adenopathy  Lungs: course trach sounds  to auscultation  Heart: regular rate and rhythm; no murmur, rubs or gallops  Abdomen: soft, nondistended, nontender, without mass or organomegaly, wounds clean  Skin: no lesions  Extremities: no clubbing, cyanosis, or edema  Neurologicunresponsive    LAB RESULTS:                        8.1    10.08 )-----------( 395      ( 2021 05:22 )             25.9         137  |  100  |  24<H>  ----------------------------<  173<H>  4.5   |  26  |  1.05    Ca    8.3<L>      2021 05:22  Phos  3.4       Mg     2.1             Urinalysis Basic - ( 2021 09:59 )    Color: Light Yellow / Appearance: Clear / S.019 / pH: x  Gluc: x / Ketone: Negative  / Bili: Negative / Urobili: Negative   Blood: x / Protein: 100 / Nitrite: Negative   Leuk Esterase: Negative / RBC: 6 /hpf / WBC 4 /HPF   Sq Epi: x / Non Sq Epi: 2 /hpf / Bacteria: Negative        MICROBIOLOGY:  RECENT CULTURES:   @ 16:58 .Sputum Sputum     Moderate Pseudomonas aeruginosa  Normal Respiratory Susan present    Few Squamous epithelial cells per low power field  Moderate polymorphonuclear leukocytes per low power field  Moderate Gram positive cocci in pairs per oil power field  Few Gram Positive Rods per oil power field  Few Gram Negative Rods per oil power field     @ 16:56 .Blood Blood     No growth to date.            RADIOLOGY REVIEWED:  < from: Xray Chest 1 View- PORTABLE-Urgent (Xray Chest 1 View- PORTABLE-Urgent .) (21 @ 09:34) >  EXAM:  XR CHEST PORTABLE URGENT 1V                            PROCEDURE DATE:  2021            INTERPRETATION:  DATE OF STUDY: 21    PRIOR:21    CLINICAL INDICATION: Fever. R/O pneumonia.    TECHNIQUE: portable chest.    FINDINGS/  IMPRESSION:  Tracheostomy cannula in good position above the david.  The cardiomediastinal silhouette is within normal limits.  The lungs are clear. No pleural effusion or pneumothorax  Bony structures are intact.    --- End of Report ---  < from: [facility] Duplex Lower Ext Vein Scan, Bilat (21 @ 12:34) >    EXAM:  DUPLEX SCAN EXT VEINS LOWER BI                            PROCEDURE DATE:  2021            INTERPRETATION:  CLINICAL INFORMATION: A prior examination, dated 2021, showed right soleal vein DVT and left posterior tibial, peroneal, gastrocnemius and soleal vein DVT, follow-up examination.    TECHNIQUE: Duplex sonography of the BILATERAL LOWER extremity veins with color and spectral Doppler, with and without compression.    FINDINGS:    RIGHT:  Normal compressibility of the RIGHTcommon femoral, femoral and popliteal veins.  Doppler examination shows normal spontaneous and phasic flow.    Right posterior tibial and peroneal veins are patent and free of thrombus.    There is persistent right soleal vein DVT without proximal propagation.    LEFT:  Normal compressibility of the LEFT common femoral, femoral and popliteal veins.  Doppler examination shows normal spontaneous and phasic flow.    There is persistent left posterior tibial, peroneal, gastrocnemius and soleal vein DVT without proximal propagation.    IMPRESSION: There has been no change in the appearance of the bilateral below the knee DVT.    There is persistent right soleal vein DVT and persistent left posterior tibial, peroneal, gastrocnemius and soleal vein DVT without proximal propagation.    --- End of Report ---            < end of copied text >          < end of copied text >          Impression: Patient admitted 3 weeks ago with sah, underwent suboccipital craniectomy and cervical lami on admit then had aneurysm resected. He required peg and trach, he required vps a few days ago. He had dx of distal dvt off anticoagulation. He has been weaned off vent now on regular floor. He has now watery diarrhea, ongoing fever, indeterminant cdif. I suspect his latest fever is from cdif. He has clean ua, clear cxr and stays with good sats on tc, exam unrevealing for other source. wounds are ok    Recommendations:  continue po vanco  f/u cultures  will hold levaquin  await cdif pcr  pulmonary toilet  monitor wounds

## 2021-11-27 DIAGNOSIS — A04.72 ENTEROCOLITIS DUE TO CLOSTRIDIUM DIFFICILE, NOT SPECIFIED AS RECURRENT: ICD-10-CM

## 2021-11-27 LAB
ANION GAP SERPL CALC-SCNC: 14 MMOL/L — SIGNIFICANT CHANGE UP (ref 5–17)
BUN SERPL-MCNC: 26 MG/DL — HIGH (ref 7–23)
CALCIUM SERPL-MCNC: 8.3 MG/DL — LOW (ref 8.4–10.5)
CHLORIDE SERPL-SCNC: 98 MMOL/L — SIGNIFICANT CHANGE UP (ref 96–108)
CO2 SERPL-SCNC: 25 MMOL/L — SIGNIFICANT CHANGE UP (ref 22–31)
CREAT SERPL-MCNC: 1.1 MG/DL — SIGNIFICANT CHANGE UP (ref 0.5–1.3)
GLUCOSE SERPL-MCNC: 170 MG/DL — HIGH (ref 70–99)
HCT VFR BLD CALC: 23.9 % — LOW (ref 39–50)
HGB BLD-MCNC: 7.6 G/DL — LOW (ref 13–17)
MAGNESIUM SERPL-MCNC: 2.2 MG/DL — SIGNIFICANT CHANGE UP (ref 1.6–2.6)
MCHC RBC-ENTMCNC: 30.3 PG — SIGNIFICANT CHANGE UP (ref 27–34)
MCHC RBC-ENTMCNC: 31.8 GM/DL — LOW (ref 32–36)
MCV RBC AUTO: 95.2 FL — SIGNIFICANT CHANGE UP (ref 80–100)
NRBC # BLD: 0 /100 WBCS — SIGNIFICANT CHANGE UP (ref 0–0)
PHOSPHATE SERPL-MCNC: 4 MG/DL — SIGNIFICANT CHANGE UP (ref 2.5–4.5)
PLATELET # BLD AUTO: 349 K/UL — SIGNIFICANT CHANGE UP (ref 150–400)
POTASSIUM SERPL-MCNC: 4.5 MMOL/L — SIGNIFICANT CHANGE UP (ref 3.5–5.3)
POTASSIUM SERPL-SCNC: 4.5 MMOL/L — SIGNIFICANT CHANGE UP (ref 3.5–5.3)
PROCALCITONIN SERPL-MCNC: 0.34 NG/ML — HIGH (ref 0.02–0.1)
RBC # BLD: 2.51 M/UL — LOW (ref 4.2–5.8)
RBC # FLD: 13.4 % — SIGNIFICANT CHANGE UP (ref 10.3–14.5)
SODIUM SERPL-SCNC: 137 MMOL/L — SIGNIFICANT CHANGE UP (ref 135–145)
WBC # BLD: 7.9 K/UL — SIGNIFICANT CHANGE UP (ref 3.8–10.5)
WBC # FLD AUTO: 7.9 K/UL — SIGNIFICANT CHANGE UP (ref 3.8–10.5)

## 2021-11-27 PROCEDURE — 99233 SBSQ HOSP IP/OBS HIGH 50: CPT

## 2021-11-27 RX ORDER — LABETALOL HCL 100 MG
100 TABLET ORAL
Refills: 0 | Status: DISCONTINUED | OUTPATIENT
Start: 2021-11-27 | End: 2021-12-07

## 2021-11-27 RX ADMIN — Medication 100 MILLIGRAM(S): at 06:04

## 2021-11-27 RX ADMIN — SODIUM CHLORIDE 3 MILLILITER(S): 9 INJECTION INTRAMUSCULAR; INTRAVENOUS; SUBCUTANEOUS at 23:18

## 2021-11-27 RX ADMIN — HUMAN INSULIN 9 UNIT(S): 100 INJECTION, SUSPENSION SUBCUTANEOUS at 17:20

## 2021-11-27 RX ADMIN — Medication 100 MILLIGRAM(S): at 17:20

## 2021-11-27 RX ADMIN — HUMAN INSULIN 9 UNIT(S): 100 INJECTION, SUSPENSION SUBCUTANEOUS at 23:18

## 2021-11-27 RX ADMIN — Medication 1 DROP(S): at 17:20

## 2021-11-27 RX ADMIN — SODIUM CHLORIDE 3 MILLILITER(S): 9 INJECTION INTRAMUSCULAR; INTRAVENOUS; SUBCUTANEOUS at 17:19

## 2021-11-27 RX ADMIN — Medication 125 MILLIGRAM(S): at 17:18

## 2021-11-27 RX ADMIN — Medication 125 MILLIGRAM(S): at 23:16

## 2021-11-27 RX ADMIN — CHLORHEXIDINE GLUCONATE 15 MILLILITER(S): 213 SOLUTION TOPICAL at 17:19

## 2021-11-27 RX ADMIN — Medication 650 MILLIGRAM(S): at 11:42

## 2021-11-27 RX ADMIN — Medication 650 MILLIGRAM(S): at 06:05

## 2021-11-27 RX ADMIN — Medication 2: at 06:04

## 2021-11-27 RX ADMIN — SODIUM CHLORIDE 3 MILLILITER(S): 9 INJECTION INTRAMUSCULAR; INTRAVENOUS; SUBCUTANEOUS at 06:04

## 2021-11-27 RX ADMIN — Medication 1 DROP(S): at 06:03

## 2021-11-27 RX ADMIN — HUMAN INSULIN 9 UNIT(S): 100 INJECTION, SUSPENSION SUBCUTANEOUS at 11:55

## 2021-11-27 RX ADMIN — ATORVASTATIN CALCIUM 40 MILLIGRAM(S): 80 TABLET, FILM COATED ORAL at 21:19

## 2021-11-27 RX ADMIN — Medication 2: at 00:01

## 2021-11-27 RX ADMIN — Medication 125 MILLIGRAM(S): at 06:05

## 2021-11-27 RX ADMIN — Medication 650 MILLIGRAM(S): at 00:07

## 2021-11-27 RX ADMIN — CHLORHEXIDINE GLUCONATE 1 APPLICATION(S): 213 SOLUTION TOPICAL at 21:19

## 2021-11-27 RX ADMIN — Medication 2: at 11:54

## 2021-11-27 RX ADMIN — Medication 1 DROP(S): at 00:01

## 2021-11-27 RX ADMIN — HEPARIN SODIUM 5000 UNIT(S): 5000 INJECTION INTRAVENOUS; SUBCUTANEOUS at 21:19

## 2021-11-27 RX ADMIN — HUMAN INSULIN 9 UNIT(S): 100 INJECTION, SUSPENSION SUBCUTANEOUS at 06:05

## 2021-11-27 RX ADMIN — CHLORHEXIDINE GLUCONATE 15 MILLILITER(S): 213 SOLUTION TOPICAL at 06:04

## 2021-11-27 RX ADMIN — Medication 2: at 17:18

## 2021-11-27 RX ADMIN — Medication 2: at 23:17

## 2021-11-27 RX ADMIN — HEPARIN SODIUM 5000 UNIT(S): 5000 INJECTION INTRAVENOUS; SUBCUTANEOUS at 06:04

## 2021-11-27 RX ADMIN — SODIUM CHLORIDE 3 MILLILITER(S): 9 INJECTION INTRAMUSCULAR; INTRAVENOUS; SUBCUTANEOUS at 00:00

## 2021-11-27 RX ADMIN — Medication 125 MILLIGRAM(S): at 00:01

## 2021-11-27 RX ADMIN — HUMAN INSULIN 9 UNIT(S): 100 INJECTION, SUSPENSION SUBCUTANEOUS at 00:00

## 2021-11-27 RX ADMIN — SODIUM CHLORIDE 3 MILLILITER(S): 9 INJECTION INTRAMUSCULAR; INTRAVENOUS; SUBCUTANEOUS at 11:42

## 2021-11-27 RX ADMIN — Medication 1 DROP(S): at 11:42

## 2021-11-27 RX ADMIN — Medication 125 MILLIGRAM(S): at 11:43

## 2021-11-27 RX ADMIN — Medication 1 DROP(S): at 23:16

## 2021-11-27 NOTE — PROGRESS NOTE ADULT - NSPROGADDITIONALINFOA_GEN_ALL_CORE
.  Gaby Gaitan MD  Division of Hospital Medicine  [facility]   Spectra: [phone]    Plan discussed with neurosurgery NP Vaishali.

## 2021-11-27 NOTE — PROGRESS NOTE ADULT - SUBJECTIVE AND OBJECTIVE BOX
CC: f/u for cdif    Patient reports nothing, not verbal    REVIEW OF SYSTEMS:  All other review of systems negative (Comprehensive ROS)cannot get  Antimicrobials Day #  :2  vancomycin    Solution 125 milliGRAM(s) Oral every 6 hours    Other Medications Reviewed    T(F): 100 (21 @ 16:26), Max: 101.7 (21 @ 11:25)  HR: 98 (21 @ 16:26)  BP: 112/72 (21 @ 16:26)  RR: 19 (21 @ 16:26)  SpO2: 96% (21 @ 16:26)  Wt(kg): --    PHYSICAL EXAM:  General: awake, not interactive no acute distress  Eyes:  anicteric, no conjunctival injection, no discharge  Oropharynx: no lesions or injection 	  Neck: supple, without adenopathy, posterior occiput wound intact, trach  Lungs: clear to auscultation  Heart: regular rate and rhythm; no murmur, rubs or gallops  Abdomen: soft, nondistended, nontender, without mass or organomegaly, peg  Skin: no lesions  Extremities: no clubbing, cyanosis, or edema  Neurologic: not interactive     LAB RESULTS:                        7.6    7.90  )-----------( 349      ( 2021 05:58 )             23.9         137  |  98  |  26<H>  ----------------------------<  170<H>  4.5   |  25  |  1.10    Ca    8.3<L>      2021 05:56  Phos  4.0       Mg     2.2             Urinalysis Basic - ( 2021 09:59 )    Color: Light Yellow / Appearance: Clear / S.019 / pH: x  Gluc: x / Ketone: Negative  / Bili: Negative / Urobili: Negative   Blood: x / Protein: 100 / Nitrite: Negative   Leuk Esterase: Negative / RBC: 6 /hpf / WBC 4 /HPF   Sq Epi: x / Non Sq Epi: 2 /hpf / Bacteria: Negative      MICROBIOLOGY:  RECENT CULTURES:   @ 13:25 .Blood Blood     No growth to date.       @ 16:58 .Sputum Sputum Pseudomonas aeruginosa    Numerous Pseudomonas aeruginosa  Normal Respiratory Susan present    Few Squamous epithelial cells per low power field  Moderate polymorphonuclear leukocytes per low power field  Moderate Gram positive cocci in pairs per oil power field  Few Gram Positive Rods per oil power field  Few Gram Negative Rods per oil power field     @ 16:56 .Blood Blood     No growth to date.          RADIOLOGY REVIEWED:      < from: CT Head No Cont (21 @ 19:25) >  EXAM:  CT BRAIN                            PROCEDURE DATE:  2021            INTERPRETATION:  CLINICAL INFORMATION:   Intracranial hemorrhage, follow-up ventricle size    TECHNIQUE:  Serial axial images were obtained from the skull base to thevertex without intravenous contrast. Coronal and sagittal reformatted images were obtained.    COMPARISON: CT head 2021    FINDINGS:  Status post suboccipital craniectomy with edema and hemorrhage focused in the cerebellar vermis, left greater than right, causing narrowing of the fourth ventricle. 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    Right parietal ventricular catheter in similar position in the mid right ventricle. The size of the ventricles is similar to prior. Stable punctate nonspecific hyperdensity in the region of the anterior third ventricle.    Scratched. Partial opacification of the bilateral mastoid air cells.    IMPRESSION:    *  Similar size andconfiguration of the ventricles. Similar position of the right ventricular catheter.    *  Similar edema and hemorrhage in the posterior fossa.    --- End of Report ---      < end of copied text >  < from: Xray Chest 1 View- PORTABLE-Urgent (Xray Chest 1 View- PORTABLE-Urgent .) (21 @ 09:34) >    EXAM:  XR CHEST PORTABLE URGENT 1V                            PROCEDURE DATE:  2021            INTERPRETATION:  DATE OF STUDY: 21    PRIOR:21    CLINICAL INDICATION: Fever. R/O pneumonia.    TECHNIQUE: portable chest.    FINDINGS/  IMPRESSION:  Tracheostomy cannula in good position above the david.  The cardiomediastinal silhouette is within normal limits.  The lungs are clear. No pleural effusion or pneumothorax  Bony structures are intact.    --- End of Report ---    < end of copied text >          Assessment:   Patient admitted 3 weeks ago with sah, underwent suboccipital craniectomy and cervical lami on admit then had aneurysm resected. He required peg and trach, he required vps a few days ago. He had dx of distal dvt off anticoagulation. He has been weaned off vent now on regular floor. He has now watery diarrhea, ongoing fever, now known positive for cdif. I suspect his latest fever is from cdif. He has clean ua, clear cxr and stays with good sats on tc, exam unrevealing for other source. wounds are ok  sputum with pseudomonas colonization  Plan:  continue enteral vancomycin  monitor off systemic antibiotic  f/u cultures

## 2021-11-27 NOTE — PROGRESS NOTE ADULT - ASSESSMENT
Patient is currently taking Zoloft, which is an SSRI. I recommend Tylenol #3 instead of Tramadol for that reason. Please discuss with pt.    Coadministration of Tramadol and Selective Serotonin Reuptake Inhibitors may be associated with an increased risk of serotonin syndrome and possibly an increase risk of seizures.    62 yo male with PMH of HTN, CAD s/p stent on DAPT, T2DM who complained of headache and subsequently become unresponsive. 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 EVD clamped & removed. NSCU course also c/b respiratory failure and dysphagia now s/p trach and PEG on 11/19. S/p VPS placement on 11/23. Completed course of cefepime for enterobacter and pseudomonas pneumonia on 11/21. Transferred out of NSCU on 11/25. Began to spike fevers overnight with watery output from rectal tube found to be positive for c. diff.

## 2021-11-27 NOTE — PROGRESS NOTE ADULT - SUBJECTIVE AND OBJECTIVE BOX
Chief Complaint:  Patient is a 61y old  Male who presents with a chief complaint of AMS (17 Nov 2021 13:07)          Interval Events:   no events    Hospital Medications:        Review of Systems:  General:  No wt loss, fevers, chills, night sweats, fatigue,   Eyes:  Good vision, no reported pain  ENT:  No sore throat, pain, runny nose, dysphagia  CV:  No pain, palpitations, hypo/hypertension  Resp:  No dyspnea, cough, tachypnea, wheezing  GI:  See HPI  :  No pain, bleeding, incontinence, nocturia  Muscle:  No pain, weakness  Neuro:  No weakness, tingling, memory problems  Psych:  No fatigue, insomnia, mood problems, depression  Endocrine:  No polyuria, polydipsia, cold/heat intolerance  Heme:  No petechiae, ecchymosis, easy bruisability  Integumentary:  No rash, edema    PHYSICAL EXAM:   Vital Signs:  Vital Signs Last 24 Hrs  T(C): 37.4 (23 Nov 2021 15:00), Max: 38.1 (22 Nov 2021 23:00)  T(F): 99.4 (23 Nov 2021 15:00), Max: 100.6 (22 Nov 2021 23:00)  HR: 89 (23 Nov 2021 15:50) (81 - 99)  BP: --  BP(mean): --  RR: 16 (23 Nov 2021 15:00) (14 - 27)  SpO2: 98% (23 Nov 2021 15:50) (96% - 100%)  Daily Height in cm: 177.8 (23 Nov 2021 15:13)    Daily       PHYSICAL EXAM:     GENERAL:  Appears stated age, well-groomed, well-nourished, no distress  HEENT:  NC/AT,  conjunctivae anicteric, clear and pink,   NECK: supple, trachea midline  CHEST:  Full & symmetric excursion, no increased effort, breath sounds clear  HEART:  Regular rhythm, no JVD  ABDOMEN:  Soft, non-tender, non-distended, normoactive bowel sounds,  no masses , no hepatosplenomegaly  EXTREMITIES:  no cyanosis,clubbing or edema  SKIN:  No rash, erythema, or, ecchymoses, no jaundice  NEURO:  Alert, non-focal, no asterixis  PSYCH: Appropriate affect, oriented to place and time  RECTAL: Deferred      LABS Personally reviewed by me:                        7.6    9.57  )-----------( 432      ( 22 Nov 2021 20:44 )             24.2     Mean Cell Volume: 96.4 fl (11-22-21 @ 20:44)    11-22    139  |  103  |  31<H>  ----------------------------<  186<H>  4.5   |  27  |  1.06    Ca    8.2<L>      22 Nov 2021 20:44  Phos  2.3     11-22  Mg     2.2     11-22        PT/INR - ( 22 Nov 2021 18:00 )   PT: 13.3 sec;   INR: 1.11 ratio         PTT - ( 22 Nov 2021 18:00 )  PTT:30.8 sec                            7.6    9.57  )-----------( 432      ( 22 Nov 2021 20:44 )             24.2                         7.3    10.18 )-----------( 388      ( 21 Nov 2021 22:18 )             22.9                         7.8    10.72 )-----------( 362      ( 20 Nov 2021 21:46 )             24.1       Imaging personally reviewed by me:

## 2021-11-27 NOTE — PROGRESS NOTE ADULT - SUBJECTIVE AND OBJECTIVE BOX
SUBJECTIVE:   Febrile   OVERNIGHT EVENTS: none    Vital Signs Last 24 Hrs  T(C): 38.7 (27 Nov 2021 11:25), Max: 38.7 (27 Nov 2021 11:25)  T(F): 101.7 (27 Nov 2021 11:25), Max: 101.7 (27 Nov 2021 11:25)  HR: 91 (27 Nov 2021 11:25) (83 - 103)  BP: 125/72 (27 Nov 2021 11:25) (99/60 - 157/81)  BP(mean): --  RR: 20 (27 Nov 2021 11:25) (18 - 20)  SpO2: 96% (27 Nov 2021 08:18) (96% - 99%)    PHYSICAL EXAM:    Constitutional: No Acute Distress     Neurological:  No eye opening to pain stimuli. Grimaces to noxious stimuli. No movement of upper extremities Triple flexion lower extremities Posterior fossa incision healed well. Shunt site sutures c/D/I EVD site staples C/D    Pulmonary: Trach collar copious secretions scattered rhonchi ,    Cardiovascular: S1, S2,+     Gastrointestinal: Soft, Non-tender, Non-distended     Extremities: No calf tenderness         LABS:                        7.6    7.90  )-----------( 349      ( 27 Nov 2021 05:58 )             23.9    11-27    137  |  98  |  26<H>  ----------------------------<  170<H>  4.5   |  25  |  1.10    Ca    8.3<L>      27 Nov 2021 05:56  Phos  4.0     11-27  Mg     2.2     11-27    finger sticks 150-200 mg/dl   IMAGING:         MEDICATIONS:  Antibiotics:  vancomycin    Solution 125 milliGRAM(s) Oral every 6 hours    acetaminophen    Suspension .. 650 milliGRAM(s) Oral every 6 hours PRN Temp greater or equal to 38C (100.4F), Mild Pain (1 - 3)  labetalol 100 milliGRAM(s) Oral two times a day  sodium chloride 3%  Inhalation 3 milliLiter(s) Inhalation every 6 hour   artificial  tears Solution 1 Drop(s) Both EYES every 6 hours  atorvastatin 40 milliGRAM(s) Oral at bedtime>  heparin   Injectable 5000 Unit(s) SubCutaneous every 8 hours  insulin lispro (ADMELOG) corrective regimen sliding scale   SubCutaneous every 6 hours  insulin NPH human recombinant 9 Unit(s) SubCutaneous every 6 hours      DIET:

## 2021-11-27 NOTE — PROGRESS NOTE ADULT - PROBLEM SELECTOR PLAN 5
s/p trach. on trach collar  - patient with significant secretions from trach at time of my exam  - needs aggressive trach care and suctioning  - monitor O2 sats

## 2021-11-27 NOTE — PROGRESS NOTE ADULT - PROBLEM SELECTOR PLAN 3
likely 2/2 to c. diff infection as above.  - UA negative  - CXR with clear lungs  - f/u blood cultures - NGTD  - rectal tube with loose/watery output  - c. diff PCR positive  - management of c. diff as above  - ID consult appreciated  - levofloxacin on hold given presumed c. diff infection.

## 2021-11-27 NOTE — PROGRESS NOTE ADULT - SUBJECTIVE AND OBJECTIVE BOX
[facility] Division of Hospital Medicine  Gaby Gaitan MD  Spectra: [phone]      Patient is a 61y old  Male who presents with a chief complaint of ICH (2021 14:43)      SUBJECTIVE / OVERNIGHT EVENTS: febrile overnight. c. diff PCR positive. unable to obtain ROS as patient non verbal.  ADDITIONAL REVIEW OF SYSTEMS:    MEDICATIONS  (STANDING):  artificial  tears Solution 1 Drop(s) Both EYES every 6 hours  atorvastatin 40 milliGRAM(s) Oral at bedtime  chlorhexidine 0.12% Liquid 15 milliLiter(s) Oral Mucosa two times a day  chlorhexidine 4% Liquid 1 Application(s) Topical <User Schedule>  dextrose 50% Injectable 25 Gram(s) IV Push once  dextrose 50% Injectable 12.5 Gram(s) IV Push once  heparin   Injectable 5000 Unit(s) SubCutaneous every 8 hours  insulin lispro (ADMELOG) corrective regimen sliding scale   SubCutaneous every 6 hours  insulin NPH human recombinant 9 Unit(s) SubCutaneous every 6 hours  labetalol 100 milliGRAM(s) Oral two times a day  sodium chloride 3%  Inhalation 3 milliLiter(s) Inhalation every 6 hours  vancomycin    Solution 125 milliGRAM(s) Oral every 6 hours    MEDICATIONS  (PRN):  acetaminophen    Suspension .. 650 milliGRAM(s) Oral every 6 hours PRN Temp greater or equal to 38C (100.4F), Mild Pain (1 - 3)      CAPILLARY BLOOD GLUCOSE      POCT Blood Glucose.: 180 mg/dL (2021 11:48)  POCT Blood Glucose.: 183 mg/dL (2021 05:53)  POCT Blood Glucose.: 153 mg/dL (2021 23:38)  POCT Blood Glucose.: 225 mg/dL (2021 18:06)    I&O's Summary    2021 07:01  -  2021 07:00  --------------------------------------------------------  IN: 2420 mL / OUT: 2200 mL / NET: 220 mL        PHYSICAL EXAM:  Vital Signs Last 24 Hrs  T(C): 38.7 (2021 11:25), Max: 38.7 (2021 11:25)  T(F): 101.7 (2021 11:25), Max: 101.7 (2021 11:25)  HR: 91 (2021 11:25) (83 - 94)  BP: 125/72 (2021 11:25) (99/60 - 125/72)  BP(mean): --  RR: 20 (2021 11:25) (18 - 20)  SpO2: 96% (2021 08:18) (96% - 99%)    CONSTITUTIONAL: lethargic, not responsive  EYES: PERRLA; conjunctiva and sclera clear  ENMT: Moist oral mucosa, +trach collar with visible white/yellow secretions  NECK: Supple, no palpable masses; no thyromegaly  RESPIRATORY: Normal respiratory effort; lungs clear to auscultation b/l anteriorly  CARDIOVASCULAR: Regular rate and rhythm, normal S1 and S2, no murmur/rub/gallop; No lower extremity edema; Peripheral pulses are 2+ bilaterally  ABDOMEN: Soft, Nondistended,  Nontender to palpation, normoactive bowel sounds, +PEG tube, +rectal tube with watery brown output  MUSCULOSKELETAL:  No clubbing or cyanosis of digits; no joint swelling or tenderness to palpation  PSYCH: lethargic, unable to assess orientation  NEUROLOGY: non-verbal, not following commands  SKIN: incisions c/d/i    LABS:                        7.6    7.90  )-----------( 349      ( 2021 05:58 )             23.9     11-27    137  |  98  |  26<H>  ----------------------------<  170<H>  4.5   |  25  |  1.10    Ca    8.3<L>      2021 05:56  Phos  4.0       Mg     2.2             Urinalysis Basic - ( 2021 09:59 )    Color: Light Yellow / Appearance: Clear / S.019 / pH: x  Gluc: x / Ketone: Negative  / Bili: Negative / Urobili: Negative   Blood: x / Protein: 100 / Nitrite: Negative   Leuk Esterase: Negative / RBC: 6 /hpf / WBC 4 /HPF   Sq Epi: x / Non Sq Epi: 2 /hpf / Bacteria: Negative        Culture - Blood (collected 2021 13:25)  Source: .Blood Blood  Preliminary Report (2021 14:01):    No growth to date.    Culture - Blood (collected 2021 13:25)  Source: .Blood Blood  Preliminary Report (2021 14:01):    No growth to date.    Culture - Sputum (collected 2021 16:58)  Source: .Sputum Sputum  Gram Stain (2021 21:22):    Few Squamous epithelial cells per low power field    Moderate polymorphonuclear leukocytes per low power field    Moderate Gram positive cocci in pairs per oil power field    Few Gram Positive Rods per oil power field    Few Gram Negative Rods per oil power field  Final Report (2021 17:42):    Numerous Pseudomonas aeruginosa    Normal Respiratory Susan present  Organism: Pseudomonas aeruginosa (2021 17:42)  Organism: Pseudomonas aeruginosa (2021 17:42)    Culture - Blood (collected 2021 16:56)  Source: .Blood Blood  Preliminary Report (2021 17:01):    No growth to date.    Culture - Blood (collected 2021 16:56)  Source: .Blood Blood  Preliminary Report (2021 17:01):    No growth to date.        RADIOLOGY & ADDITIONAL TESTS:  Results Reviewed: c. diff pcr positive, blood cultures with no growth  Imaging Personally Reviewed:  Electrocardiogram Personally Reviewed:    COORDINATION OF CARE:  Care Discussed with Consultants/Other Providers [Y]: neurosurgery NP Vaishali  Prior or Outpatient Records Reviewed [Y/N]:

## 2021-11-27 NOTE — PROGRESS NOTE ADULT - PROBLEM SELECTOR PLAN 2
c. diff PCR positive. likely 2/2 to abx use  - c/w vanco 125mg PO q6h for total 10 day course  - monitor stool output  - added FWB to offset diarrheal losses

## 2021-11-27 NOTE — PROGRESS NOTE ADULT - ASSESSMENT
60 yo male with PMH of HTN, CAD s/p stent on DAPT, T2DM who complained of headache at work at approximately ~8:30, starting feeling dizzy and vomiting, HTN/keke when EMS got to him. SBP 220s, HR 50s. On EMS arrival at 09:39AM 11/4/21, patient seen talking a little, garbling, moving all extremities, then quickly became unresponsive. No known blood thinners. CTH showed large posterior fossa bleed w/ IVH/SAH/hydro. Pre/intubation exam: no eyes open, pupils 2b/l, + corneals/cough/gag, not FC, LUE spont fingers moving, flaccid otherwise. 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 EVD clamped and removed. NSCU course complicated by respiratory failure and dysphagia now s/p trach and PEG on 11/19. Increased pseudomeningocele ?   S/p VPS placement Certas @ 4.0  on 11/23. Completed course of cefepime for enterobacter and pseudomonas pneumonia on 11/21. Transferred out of NSCU on 11/25. Began to spike fevers overnight with watery output from rectal tube. Pan cx 11/26. Cdiff indeterminate   CT head 11/26 stable vent/ stable edema & heme post fossa       Plan    Neuro poor exam  Vitals- BP soft. d/c cardura. Labetalol dose decreased.   Fevers- Likely C diff . F/u pan cx . ID following  labs acceptable   DVT ppx  Type 2 DM- On NPH 9U q6. -200  Supportive care - Trach collar Turning & positioning . Chest PT

## 2021-11-27 NOTE — PROGRESS NOTE ADULT - PROBLEM SELECTOR PLAN 1
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 EVD clamped & removed.  - care per neurosurgery

## 2021-11-27 NOTE — PROGRESS NOTE ADULT - ASSESSMENT
61 year old man with respiratory failure d/t ICH    1. ICH  -per neurosurgery, s/p EVD  -for  shunt    2. Respiratory failure  -for tracheostomy, will require long term enteral nutrition  -s/p PEG, tolerating feeds  - aspiration precautions     3. Enterobacter PNA  -s/p course of antibiotics     4. afib with RVR    5. Anemia, no overt GI bleed. EGD unremarkable.  -trend CBC    The plan of care was discussed with the physician assistant and modifications were made to the notation where appropriate.   Differential diagnosis and plan of care discussed with patient after the evaluation  35 minutes spent on total encounter of which more than fifty percent of the encounter was spent counseling and/or coordinating care by the attending physician.    [facility]  Gastroenterology and Hepatology  [address] [place], [place]  Office: [phone]  Cell: [phone]

## 2021-11-27 NOTE — PROGRESS NOTE ADULT - PROBLEM SELECTOR PLAN 4
sputum culture grew enterobacter and most recently pseudomonas.  - s/p 7 day course of cefepime on 11/21  - was started on levofloxacin for 11/24 sputum cx with pseudomonas  - ID consult appreciated  - levofloxacin held as per ID given c. diff infection

## 2021-11-27 NOTE — PROGRESS NOTE ADULT - PROBLEM SELECTOR PLAN 6
right soleal vein DVT and persistent left posterior tibial, peroneal, gastrocnemius and soleal vein DVT without proximal propagation on last duplex on 11/24  - c/w serial duplex to monitor for propagation  - on hep subc ppx dosing

## 2021-11-28 DIAGNOSIS — R21 RASH AND OTHER NONSPECIFIC SKIN ERUPTION: ICD-10-CM

## 2021-11-28 LAB
ANION GAP SERPL CALC-SCNC: 12 MMOL/L — SIGNIFICANT CHANGE UP (ref 5–17)
APPEARANCE UR: CLEAR — SIGNIFICANT CHANGE UP
BACTERIA # UR AUTO: NEGATIVE — SIGNIFICANT CHANGE UP
BASOPHILS # BLD AUTO: 0.02 K/UL — SIGNIFICANT CHANGE UP (ref 0–0.2)
BASOPHILS NFR BLD AUTO: 0.2 % — SIGNIFICANT CHANGE UP (ref 0–2)
BILIRUB UR-MCNC: NEGATIVE — SIGNIFICANT CHANGE UP
BUN SERPL-MCNC: 29 MG/DL — HIGH (ref 7–23)
CALCIUM SERPL-MCNC: 8.9 MG/DL — SIGNIFICANT CHANGE UP (ref 8.4–10.5)
CHLORIDE SERPL-SCNC: 96 MMOL/L — SIGNIFICANT CHANGE UP (ref 96–108)
CO2 SERPL-SCNC: 28 MMOL/L — SIGNIFICANT CHANGE UP (ref 22–31)
COLOR SPEC: SIGNIFICANT CHANGE UP
CREAT SERPL-MCNC: 1.09 MG/DL — SIGNIFICANT CHANGE UP (ref 0.5–1.3)
DIFF PNL FLD: ABNORMAL
EOSINOPHIL # BLD AUTO: 0.32 K/UL — SIGNIFICANT CHANGE UP (ref 0–0.5)
EOSINOPHIL NFR BLD AUTO: 2.9 % — SIGNIFICANT CHANGE UP (ref 0–6)
EPI CELLS # UR: 1 /HPF — SIGNIFICANT CHANGE UP
GLUCOSE SERPL-MCNC: 190 MG/DL — HIGH (ref 70–99)
GLUCOSE UR QL: ABNORMAL
GRAM STN FLD: SIGNIFICANT CHANGE UP
HCT VFR BLD CALC: 22.9 % — LOW (ref 39–50)
HGB BLD-MCNC: 7.4 G/DL — LOW (ref 13–17)
HYALINE CASTS # UR AUTO: 0 /LPF — SIGNIFICANT CHANGE UP (ref 0–2)
IMM GRANULOCYTES NFR BLD AUTO: 0.6 % — SIGNIFICANT CHANGE UP (ref 0–1.5)
KETONES UR-MCNC: NEGATIVE — SIGNIFICANT CHANGE UP
LEUKOCYTE ESTERASE UR-ACNC: NEGATIVE — SIGNIFICANT CHANGE UP
LYMPHOCYTES # BLD AUTO: 1.43 K/UL — SIGNIFICANT CHANGE UP (ref 1–3.3)
LYMPHOCYTES # BLD AUTO: 12.8 % — LOW (ref 13–44)
MAGNESIUM SERPL-MCNC: 2.3 MG/DL — SIGNIFICANT CHANGE UP (ref 1.6–2.6)
MCHC RBC-ENTMCNC: 30.2 PG — SIGNIFICANT CHANGE UP (ref 27–34)
MCHC RBC-ENTMCNC: 32.3 GM/DL — SIGNIFICANT CHANGE UP (ref 32–36)
MCV RBC AUTO: 93.5 FL — SIGNIFICANT CHANGE UP (ref 80–100)
MONOCYTES # BLD AUTO: 0.66 K/UL — SIGNIFICANT CHANGE UP (ref 0–0.9)
MONOCYTES NFR BLD AUTO: 5.9 % — SIGNIFICANT CHANGE UP (ref 2–14)
NEUTROPHILS # BLD AUTO: 8.63 K/UL — HIGH (ref 1.8–7.4)
NEUTROPHILS NFR BLD AUTO: 77.6 % — HIGH (ref 43–77)
NITRITE UR-MCNC: NEGATIVE — SIGNIFICANT CHANGE UP
NRBC # BLD: 0 /100 WBCS — SIGNIFICANT CHANGE UP (ref 0–0)
PH UR: 6 — SIGNIFICANT CHANGE UP (ref 5–8)
PHOSPHATE SERPL-MCNC: 3.8 MG/DL — SIGNIFICANT CHANGE UP (ref 2.5–4.5)
PLATELET # BLD AUTO: 431 K/UL — HIGH (ref 150–400)
POTASSIUM SERPL-MCNC: 4.6 MMOL/L — SIGNIFICANT CHANGE UP (ref 3.5–5.3)
POTASSIUM SERPL-SCNC: 4.6 MMOL/L — SIGNIFICANT CHANGE UP (ref 3.5–5.3)
PROT UR-MCNC: ABNORMAL
RBC # BLD: 2.45 M/UL — LOW (ref 4.2–5.8)
RBC # FLD: 13.3 % — SIGNIFICANT CHANGE UP (ref 10.3–14.5)
RBC CASTS # UR COMP ASSIST: 5 /HPF — HIGH (ref 0–4)
SODIUM SERPL-SCNC: 136 MMOL/L — SIGNIFICANT CHANGE UP (ref 135–145)
SP GR SPEC: 1.02 — SIGNIFICANT CHANGE UP (ref 1.01–1.02)
SPECIMEN SOURCE: SIGNIFICANT CHANGE UP
UROBILINOGEN FLD QL: NEGATIVE — SIGNIFICANT CHANGE UP
WBC # BLD: 11.13 K/UL — HIGH (ref 3.8–10.5)
WBC # FLD AUTO: 11.13 K/UL — HIGH (ref 3.8–10.5)
WBC UR QL: 3 /HPF — SIGNIFICANT CHANGE UP (ref 0–5)

## 2021-11-28 PROCEDURE — 71045 X-RAY EXAM CHEST 1 VIEW: CPT | Mod: 26

## 2021-11-28 PROCEDURE — 99233 SBSQ HOSP IP/OBS HIGH 50: CPT

## 2021-11-28 RX ORDER — ACETAMINOPHEN 500 MG
1000 TABLET ORAL ONCE
Refills: 0 | Status: COMPLETED | OUTPATIENT
Start: 2021-11-28 | End: 2021-11-28

## 2021-11-28 RX ORDER — NYSTATIN CREAM 100000 [USP'U]/G
1 CREAM TOPICAL
Refills: 0 | Status: DISCONTINUED | OUTPATIENT
Start: 2021-11-28 | End: 2021-12-28

## 2021-11-28 RX ORDER — CEFEPIME 1 G/1
2000 INJECTION, POWDER, FOR SOLUTION INTRAMUSCULAR; INTRAVENOUS EVERY 8 HOURS
Refills: 0 | Status: DISCONTINUED | OUTPATIENT
Start: 2021-11-28 | End: 2021-12-01

## 2021-11-28 RX ORDER — HUMAN INSULIN 100 [IU]/ML
10 INJECTION, SUSPENSION SUBCUTANEOUS EVERY 6 HOURS
Refills: 0 | Status: DISCONTINUED | OUTPATIENT
Start: 2021-11-28 | End: 2021-12-03

## 2021-11-28 RX ADMIN — SODIUM CHLORIDE 3 MILLILITER(S): 9 INJECTION INTRAMUSCULAR; INTRAVENOUS; SUBCUTANEOUS at 05:33

## 2021-11-28 RX ADMIN — Medication 125 MILLIGRAM(S): at 18:08

## 2021-11-28 RX ADMIN — Medication 2: at 05:34

## 2021-11-28 RX ADMIN — ATORVASTATIN CALCIUM 40 MILLIGRAM(S): 80 TABLET, FILM COATED ORAL at 21:07

## 2021-11-28 RX ADMIN — SODIUM CHLORIDE 3 MILLILITER(S): 9 INJECTION INTRAMUSCULAR; INTRAVENOUS; SUBCUTANEOUS at 18:03

## 2021-11-28 RX ADMIN — Medication 100 MILLIGRAM(S): at 18:08

## 2021-11-28 RX ADMIN — NYSTATIN CREAM 1 APPLICATION(S): 100000 CREAM TOPICAL at 18:15

## 2021-11-28 RX ADMIN — Medication 2: at 17:59

## 2021-11-28 RX ADMIN — Medication 100 MILLIGRAM(S): at 05:33

## 2021-11-28 RX ADMIN — Medication 1 DROP(S): at 12:10

## 2021-11-28 RX ADMIN — Medication 650 MILLIGRAM(S): at 12:06

## 2021-11-28 RX ADMIN — Medication 2: at 12:06

## 2021-11-28 RX ADMIN — CHLORHEXIDINE GLUCONATE 1 APPLICATION(S): 213 SOLUTION TOPICAL at 21:18

## 2021-11-28 RX ADMIN — Medication 1 DROP(S): at 18:09

## 2021-11-28 RX ADMIN — SODIUM CHLORIDE 3 MILLILITER(S): 9 INJECTION INTRAMUSCULAR; INTRAVENOUS; SUBCUTANEOUS at 12:05

## 2021-11-28 RX ADMIN — HEPARIN SODIUM 5000 UNIT(S): 5000 INJECTION INTRAVENOUS; SUBCUTANEOUS at 05:33

## 2021-11-28 RX ADMIN — HUMAN INSULIN 10 UNIT(S): 100 INJECTION, SUSPENSION SUBCUTANEOUS at 18:00

## 2021-11-28 RX ADMIN — Medication 400 MILLIGRAM(S): at 01:27

## 2021-11-28 RX ADMIN — HEPARIN SODIUM 5000 UNIT(S): 5000 INJECTION INTRAVENOUS; SUBCUTANEOUS at 14:01

## 2021-11-28 RX ADMIN — Medication 125 MILLIGRAM(S): at 14:03

## 2021-11-28 RX ADMIN — Medication 1 DROP(S): at 05:33

## 2021-11-28 RX ADMIN — CEFEPIME 100 MILLIGRAM(S): 1 INJECTION, POWDER, FOR SOLUTION INTRAMUSCULAR; INTRAVENOUS at 21:06

## 2021-11-28 RX ADMIN — HUMAN INSULIN 10 UNIT(S): 100 INJECTION, SUSPENSION SUBCUTANEOUS at 12:09

## 2021-11-28 RX ADMIN — CHLORHEXIDINE GLUCONATE 15 MILLILITER(S): 213 SOLUTION TOPICAL at 18:08

## 2021-11-28 RX ADMIN — HUMAN INSULIN 9 UNIT(S): 100 INJECTION, SUSPENSION SUBCUTANEOUS at 05:35

## 2021-11-28 RX ADMIN — CHLORHEXIDINE GLUCONATE 15 MILLILITER(S): 213 SOLUTION TOPICAL at 05:34

## 2021-11-28 RX ADMIN — Medication 125 MILLIGRAM(S): at 05:34

## 2021-11-28 RX ADMIN — HEPARIN SODIUM 5000 UNIT(S): 5000 INJECTION INTRAVENOUS; SUBCUTANEOUS at 21:06

## 2021-11-28 NOTE — PROGRESS NOTE ADULT - ASSESSMENT
62 yo male with PMH of HTN, CAD s/p stent on DAPT, T2DM who complained of headache and subsequently become unresponsive. 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 EVD clamped & removed. NSCU course also c/b respiratory failure and dysphagia now s/p trach and PEG on 11/19. S/p VPS placement on 11/23. Completed course of cefepime for enterobacter and pseudomonas pneumonia on 11/21. Transferred out of NSCU on 11/25. Began to spike fevers overnight with watery output from rectal tube found to be positive for c. diff.

## 2021-11-28 NOTE — PROGRESS NOTE ADULT - SUBJECTIVE AND OBJECTIVE BOX
CC: f/u for cdif    Patient reports  nothing, unresponsive  REVIEW OF SYSTEMS:  All other review of systems negative (Comprehensive ROS)cannot get    Antimicrobials Day #  :3  vancomycin    Solution 125 milliGRAM(s) Oral every 6 hours    Other Medications Reviewed    T(F): 100 (21 @ 11:59), Max: 102.9 (21 @ 00:01)  HR: 95 (21 @ 11:59)  BP: 114/68 (21 @ 11:59)  RR: 18 (21 @ 11:59)  SpO2: 98% (21 @ 11:59)  Wt(kg): --    PHYSICAL EXAM:  General:  no acute distress  Eyes:  anicteric, no conjunctival injection, no discharge  Oropharynx: no lesions or injection 	  Neck: supple, without adenopathy, trach  Lungs: diffuse rhonchi to auscultation  Heart: regular rate and rhythm; no murmur, rubs or gallops  Abdomen: soft, nondistended, nontender, without mass or organomegaly  Skin: no lesions  Extremities: no clubbing, cyanosis, or edema  Neurologic: unresponsive    LAB RESULTS:                        7.4    .13 )-----------( 431      ( 2021 09:09 )             22.9         136  |  96  |  29<H>  ----------------------------<  190<H>  4.6   |  28  |  1.09    Ca    8.9      2021 09:09  Phos  3.8       Mg     2.3             Urinalysis Basic - ( 2021 12:37 )    Color: Light Yellow / Appearance: Clear / S.020 / pH: x  Gluc: x / Ketone: Negative  / Bili: Negative / Urobili: Negative   Blood: x / Protein: 30 mg/dL / Nitrite: Negative   Leuk Esterase: Negative / RBC: 5 /hpf / WBC 3 /HPF   Sq Epi: x / Non Sq Epi: 1 /hpf / Bacteria: Negative      MICROBIOLOGY:  RECENT CULTURES:   @ 13:25 .Blood Blood     No growth to date.       @ 16:58 .Sputum Sputum Pseudomonas aeruginosa    Numerous Pseudomonas aeruginosa  Normal Respiratory Susan present    Few Squamous epithelial cells per low power field  Moderate polymorphonuclear leukocytes per low power field  Moderate Gram positive cocci in pairs per oil power field  Few Gram Positive Rods per oil power field  Few Gram Negative Rods per oil power field     @ 16:56 .Blood Blood     No growth to date.          RADIOLOGY REVIEWED:      < from: Xray Chest 1 View- PORTABLE-Urgent (Xray Chest 1 View- PORTABLE-Urgent .) (21 @ 09:06) >    EXAM:  XR CHEST PORTABLE URGENT 1V                            PROCEDURE DATE:  2021            INTERPRETATION:  HISTORY: Fever. Status post tracheostomy.    TECHNIQUE: A single AP view of the chest was obtained.    COMPARISON: 2021    FINDINGS:  The cardiac silhouette is normal in size. There is a tracheostomy tube in place. There are no focal consolidations or pleural effusions. The hilar and mediastinal structures appear unremarkable. The osseous structures are intact.    IMPRESSION: Clear lungs.      < end of copied text >  < from: CT Head No Cont (21 @ 19:25) >    EXAM:  CT BRAIN                            PROCEDURE DATE:  2021            INTERPRETATION:  CLINICAL INFORMATION:   Intracranial hemorrhage, follow-up ventricle size    TECHNIQUE:  Serial axial images were obtained from the skull base to thevertex without intravenous contrast. Coronal and sagittal reformatted images were obtained.    COMPARISON: CT head 2021    FINDINGS:  Status post suboccipital craniectomy with edema and hemorrhage focused in the cerebellar vermis, left greater than right, causing narrowing of the fourth ventricle. 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    Right parietal ventricular catheter in similar position in the mid right ventricle. The size of the ventricles is similar to prior. Stable punctate nonspecific hyperdensity in the region of the anterior third ventricle.    Scratched. Partial opacification of the bilateral mastoid air cells.    IMPRESSION:    *  Similar size andconfiguration of the ventricles. Similar position of the right ventricular catheter.    *  Similar edema and hemorrhage in the posterior fossa.    --- End of Report ---      < end of copied text >          Assessment:  Patient admitted 3 weeks ago with sah, underwent suboccipital craniectomy and cervical lami on admit then had aneurysm resected. He required peg and trach, he required vps a few days ago. He had dx of distal dvt off anticoagulation. He has been weaned off vent now on regular floor. HE had tx with cefepime ending about a week ago for pneumonia.  He developed  watery diarrhea, ongoing fever, now known positive for cdif.He has been on enteral vanco now day 3 and still has fever. His abdomen is soft and not having overwhelming stool output so I am no longer convinced that cdif is only infection. I am concerned that his volume of sputum is a lot higher today and despite neg cxr he could have pulmonary infection. He is having regular straight cath and ua is not with any major pyuria. wounds are ok. He has a fungal rash on the groin but not a disseminated rash. seems early for vps infection  sputum with pseudomonas colonization  Plan:  continue enteral vancomycin  new cultures sent   will start back on cefepime  supportive care

## 2021-11-28 NOTE — PROGRESS NOTE ADULT - ASSESSMENT
HPI:  Patient is a 61 year old male that developed headache, dizziness, and vomiting.  CTH done showed posterior fossa hemorrhage.  Admitted to the neurosurgery service for further management.    PROCEDURE: s/p suboccipital crani for posterior fossa decompression on 11/4/2021, s/p cerebral angiogram showing left PICA aneurysm on 11/5/2021, s/p cerebral angiogram showing left PICA fistula on 11/9/2021, s/p crani for PICA aneurysm resection on 11/10/2021, s/p insertion of right parietal ventriculoperitoneal shunt (Certas @ 4) on 11/23/2021  POD#24, #18, #5  PAD#23, #19    PLAN:  Neuro:   -q4 hour neuro checks  -tylenol for pain control and fevers  -out of bed with assistance  -pt/ot - subacute rehab upon discharge  -likely repeat angiogram this upcoming week    Respiratory:   -trach collar  -3% inhalation    CV:  -keep sbp 100-140  -labetalol for bp control  -atorvastatin for lipid control    Endocrine:   -nph and JOSE MARTIN for glucose control  -keep fingersticks 100-180    Heme/Onc:    -heparin for dvt prophylaxis  -no SCDs as patient has b/l lower extremity dvts  -vascular cardiology consult pending    Renal:   -electrolytes stable  -intermittent straight cath for urinary retention, if continues to retain will require teixeira    ID:   -febrile to 102.9, fever workup in progress  -po vanco for c. diff  -ID following    GI:   -tube feeds (glucerna 1.2 @ 80) via peg      Spectra #[id number]   HPI:  Patient is a 61 year old male that developed headache, dizziness, and vomiting.  CTH done showed posterior fossa hemorrhage.  Admitted to the neurosurgery service for further management.    PROCEDURE: s/p suboccipital crani for posterior fossa decompression on 11/4/2021, s/p cerebral angiogram showing left PICA aneurysm on 11/5/2021, s/p cerebral angiogram showing left PICA fistula on 11/9/2021, s/p crani for PICA aneurysm resection on 11/10/2021, s/p insertion of right parietal ventriculoperitoneal shunt (Certas @ 4) on 11/23/2021  POD#24, #18, #5  PAD#23, #19    PLAN:  Neuro:   -q4 hour neuro checks  -tylenol for pain control and fevers  -out of bed with assistance  -pt/ot - subacute rehab upon discharge  -likely repeat angiogram this upcoming week    Respiratory:   -trach collar  -3% inhalation    CV:  -keep sbp 100-140  -labetalol for bp control  -atorvastatin for lipid control    Endocrine:   -nph and JOSE MARTIN for glucose control  -keep fingersticks 100-180    Heme/Onc:    -heparin for dvt prophylaxis  -no SCDs as patient has b/l lower extremity dvts  -vascular cardiology consult pending  -acute post operative blood loss anemia, stable    Renal:   -electrolytes stable  -teixeira for urinary retention    ID:   -febrile to 102.9, fever workup in progress  -po vanco for c. diff  -ID following    GI:   -tube feeds (glucerna 1.2 @ 80) via peg      Spectra #[id number]   HPI:  Patient is a 61 year old male that developed headache, dizziness, and vomiting.  CTH done showed posterior fossa hemorrhage.  Admitted to the neurosurgery service for further management.    PROCEDURE: s/p suboccipital crani for posterior fossa decompression on 11/4/2021, s/p cerebral angiogram showing left PICA aneurysm on 11/5/2021, s/p cerebral angiogram showing left PICA fistula on 11/9/2021, s/p crani for PICA aneurysm resection on 11/10/2021, s/p insertion of right parietal ventriculoperitoneal shunt (Certas @ 4) on 11/23/2021  POD#24, #18, #5  PAD#23, #19    PLAN:  Neuro:   -q4 hour neuro checks  -waxing and waning exam, likely related to intermittent fevers  -tylenol for pain control and fevers  -out of bed with assistance  -pt/ot - subacute rehab upon discharge  -likely repeat angiogram this upcoming week    Respiratory:   -trach collar  -3% inhalation    CV:  -keep sbp 100-140  -labetalol for bp control  -atorvastatin for lipid control    Endocrine:   -nph and JOSE MARTIN for glucose control  -keep fingersticks 100-180    Heme/Onc:    -heparin for dvt prophylaxis  -no SCDs as patient has b/l lower extremity dvts  -vascular cardiology consult pending  -acute post operative blood loss anemia, stable    Renal:   -electrolytes stable  -teixeira for urinary retention    ID:   -febrile to 102.9, fever workup in progress  -po vanco for c. diff  -ID following    GI:   -tube feeds (glucerna 1.2 @ 80) via peg      Spectra #[id number]

## 2021-11-28 NOTE — PROGRESS NOTE ADULT - PROBLEM SELECTOR PLAN 10
HbA1c 6.5%  - c/w NPH while on tube feeds HbA1c 6.5%  - increased NPH to 10 units q6h today  - monitor FS q6h

## 2021-11-28 NOTE — PROGRESS NOTE ADULT - SUBJECTIVE AND OBJECTIVE BOX
[facility] Division of Hospital Medicine  Gaby Gaitan MD  Spectra: [phone]      Patient is a 61y old  Male who presents with a chief complaint of brain bleed (2021 19:34)      SUBJECTIVE / OVERNIGHT EVENTS: still febrile with Tmax 102.9. per neurosurgery PA, patient did open eyes for him a give thumbs up earlier this morning but at time of my exam, still does not open eyes to both loud verbal and noxious stimuli for me.   ADDITIONAL REVIEW OF SYSTEMS:    MEDICATIONS  (STANDING):  artificial  tears Solution 1 Drop(s) Both EYES every 6 hours  atorvastatin 40 milliGRAM(s) Oral at bedtime  chlorhexidine 0.12% Liquid 15 milliLiter(s) Oral Mucosa two times a day  chlorhexidine 4% Liquid 1 Application(s) Topical <User Schedule>  dextrose 50% Injectable 25 Gram(s) IV Push once  dextrose 50% Injectable 12.5 Gram(s) IV Push once  heparin   Injectable 5000 Unit(s) SubCutaneous every 8 hours  insulin lispro (ADMELOG) corrective regimen sliding scale   SubCutaneous every 6 hours  insulin NPH human recombinant 10 Unit(s) SubCutaneous every 6 hours  labetalol 100 milliGRAM(s) Oral two times a day  sodium chloride 3%  Inhalation 3 milliLiter(s) Inhalation every 6 hours  vancomycin    Solution 125 milliGRAM(s) Oral every 6 hours    MEDICATIONS  (PRN):  acetaminophen    Suspension .. 650 milliGRAM(s) Oral every 6 hours PRN Temp greater or equal to 38C (100.4F), Mild Pain (1 - 3)      CAPILLARY BLOOD GLUCOSE      POCT Blood Glucose.: 193 mg/dL (2021 12:00)  POCT Blood Glucose.: 193 mg/dL (2021 05:08)  POCT Blood Glucose.: 198 mg/dL (2021 23:15)  POCT Blood Glucose.: 217 mg/dL (2021 21:32)  POCT Blood Glucose.: 160 mg/dL (2021 16:35)    I&O's Summary    2021 07:01  -  2021 07:00  --------------------------------------------------------  IN: 0 mL / OUT: 1800 mL / NET: -1800 mL    2021 07:01  -  2021 15:18  --------------------------------------------------------  IN: 0 mL / OUT: 1000 mL / NET: -1000 mL        PHYSICAL EXAM:  Vital Signs Last 24 Hrs  T(C): 37.8 (2021 11:59), Max: 39.4 (2021 00:01)  T(F): 100 (2021 11:59), Max: 102.9 (2021 00:01)  HR: 95 (:) (87 - 105)  BP: 114/68 (2021 11:59) (112/72 - 151/75)  BP(mean): --  RR: 18 (2021 11:59) (18 - 19)  SpO2: 98% (2021 11:59) (95% - 100%)    CONSTITUTIONAL: lethargic, not responsive to both verbal nor noxious stimuli fo rme  EYES: PERRLA; conjunctiva and sclera clear  ENMT: Moist oral mucosa, +trach collar still with visible white/yellow secretions  NECK: Supple, no palpable masses; no thyromegaly  RESPIRATORY: Normal respiratory effort; lungs clear to auscultation b/l anteriorly  CARDIOVASCULAR: Regular rate and rhythm, normal S1 and S2, no murmur/rub/gallop; No lower extremity edema; Peripheral pulses are 2+ bilaterally  ABDOMEN: Soft, Nondistended,  Nontender to palpation, normoactive bowel sounds, +PEG tube, +rectal tube with watery brown output  MUSCULOSKELETAL:  No clubbing or cyanosis of digits; no joint swelling or tenderness to palpation  PSYCH: lethargic, unable to assess orientation  NEUROLOGY: doesn't open eyes to loud verbal nor noxious stimuli for me, non-verbal, not following commands  SKIN: incisions c/d/i, +pink erythematous fungal appearing rash in b/l groin with R side extending to lower abd and hip    LABS:                        7.4    11.13 )-----------( 431      ( 2021 09:09 )             22.9     11-    136  |  96  |  29<H>  ----------------------------<  190<H>  4.6   |  28  |  1.09    Ca    8.9      2021 09:09  Phos  3.8       Mg     2.3           Urinalysis Basic - ( 2021 12:37 )    Color: Light Yellow / Appearance: Clear / S.020 / pH: x  Gluc: x / Ketone: Negative  / Bili: Negative / Urobili: Negative   Blood: x / Protein: 30 mg/dL / Nitrite: Negative   Leuk Esterase: Negative / RBC: 5 /hpf / WBC 3 /HPF   Sq Epi: x / Non Sq Epi: 1 /hpf / Bacteria: Negative      Culture - Blood (collected 2021 13:25)  Source: .Blood Blood  Preliminary Report (2021 14:01):    No growth to date.    Culture - Blood (collected 2021 13:25)  Source: .Blood Blood  Preliminary Report (2021 14:01):    No growth to date.        RADIOLOGY & ADDITIONAL TESTS:  Results Reviewed: new leukocytosis, H/H stable  Imaging Personally Reviewed:  Electrocardiogram Personally Reviewed:    COORDINATION OF CARE:  Care Discussed with Consultants/Other Providers [Y]: Neurosurgery QASIM Armstrong  Prior or Outpatient Records Reviewed [Y]: ID progress note

## 2021-11-28 NOTE — PROGRESS NOTE ADULT - PROBLEM SELECTOR PLAN 3
likely 2/2 to c. diff infection as above. but still spiking high fevers.  - UA negative  - CXR with clear lungs  - f/u blood cultures - NGTD  - rectal tube with loose/watery output  - c. diff PCR positive  - management of c. diff as above  - repeat infectious work-up sent today, f/u results  - repeat CXR on personal review looks relative unchanged  - ID consult appreciated  - levofloxacin on hold given presumed c. diff infection.

## 2021-11-28 NOTE — PROGRESS NOTE ADULT - PROBLEM SELECTOR PLAN 2
c. diff PCR positive. likely 2/2 to abx use  - c/w vanco 125mg PO q6h for total 10 day course  - monitor stool output from rectal tube  - added FWB to offset diarrheal losses

## 2021-11-28 NOTE — PROGRESS NOTE ADULT - PROBLEM SELECTOR PLAN 5
new fungal-appearing rash in b/l groin with right side extending to lower abd and lateral hip  - keep area dry  - apply nystatin powder

## 2021-11-28 NOTE — PROGRESS NOTE ADULT - ASSESSMENT
61 year old man with respiratory failure d/t ICH    1. ICH  -per neurosurgery, s/p EVD  -for  shunt    2. Respiratory failure  -for tracheostomy, will require long term enteral nutrition  -s/p PEG, tolerating feeds  - aspiration precautions     3. Enterobacter PNA  -s/p course of antibiotics     4. afib with RVR    5. Anemia, no overt GI bleed. EGD unremarkable.  -trend CBC    The plan of care was discussed with the physician assistant and modifications were made to the notation where appropriate.   Differential diagnosis and plan of care discussed with patient after the evaluation  35 minutes spent on total encounter of which more than fifty percent of the encounter was spent counseling and/or coordinating care by the attending physician.    [facility]  Gastroenterology and Hepatology  [address] [place], [place]  Office: [phone]  Cell: [phone]

## 2021-11-28 NOTE — PROGRESS NOTE ADULT - SUBJECTIVE AND OBJECTIVE BOX
HPI:  Patient is a 61 year old male that developed headache, dizziness, and vomiting.  CTH done showed posterior fossa hemorrhage.  Admitted to the neurosurgery service for further management.    OVERNIGHT EVENTS:  Spiked fever yesterday, improved with tylenol.  On po vanco for c. diff, ID following.  Tolerating tube feeds.      Vital Signs Last 24 Hrs  T(C): 36.9 (2021 04:11), Max: 39.4 (2021 00:01)  T(F): 98.4 (2021 04:11), Max: 102.9 (2021 00:01)  HR: 87 (2021 04:11) (87 - 105)  BP: 125/77 (2021 04:11) (112/72 - 151/75)  BP(mean): --  RR: 18 (2021 04:11) (18 - 20)  SpO2: 99% (2021 04:11) (95% - 99%)    I&O's Detail    2021 07:01  -  2021 07:00  --------------------------------------------------------  IN:  Total IN: 0 mL    OUT:    Intermittent Catheterization - Urethral (mL): 1800 mL  Total OUT: 1800 mL    Total NET: -1800 mL        I&O's Summary    2021 07:01  -  2021 07:00  --------------------------------------------------------  IN: 0 mL / OUT: 1800 mL / NET: -1800 mL        PHYSICAL EXAM:  Neurological: opens eyes to voice, non-verbal, pupils 2mm and non-reactive, follows some simple commands (left hand thumbs up, wiggles toes b/l), RUE 0/5, LUE 1/5, RLE wiggles toes, LLE 1-2/5    Cardiovascular: +s1, s2  Respiratory: clear to auscultation b/l, +trach  Gastrointestinal: soft, non-distended, non-tender, +peg  Genitourinary: +intermittent straight cath  Extremities: +dp/pt pulses palpable b/l  Incision/Wound: crani incisions c/d/i w/ staples    TUBES/LINES:  [x] peg    DIET:  [x] tube feeds (glucerna 1.2 @ 80) via peg      LABS:                        7.4    11.13 )-----------( 431      ( 2021 09:09 )             22.9         137  |  98  |  26<H>  ----------------------------<  170<H>  4.5   |  25  |  1.10    Ca    8.3<L>      2021 05:56  Phos  4.0       Mg     2.2             Urinalysis Basic - ( 2021 09:59 )    Color: Light Yellow / Appearance: Clear / S.019 / pH: x  Gluc: x / Ketone: Negative  / Bili: Negative / Urobili: Negative   Blood: x / Protein: 100 / Nitrite: Negative   Leuk Esterase: Negative / RBC: 6 /hpf / WBC 4 /HPF   Sq Epi: x / Non Sq Epi: 2 /hpf / Bacteria: Negative          CAPILLARY BLOOD GLUCOSE      POCT Blood Glucose.: 193 mg/dL (2021 05:08)  POCT Blood Glucose.: 198 mg/dL (2021 23:15)  POCT Blood Glucose.: 217 mg/dL (2021 21:32)  POCT Blood Glucose.: 160 mg/dL (2021 16:35)  POCT Blood Glucose.: 180 mg/dL (2021 11:48)          Allergies    penicillin (Other)          MEDICATIONS:  Antibiotics:  vancomycin    Solution 125 milliGRAM(s) Oral every 6 hours    Neuro:  acetaminophen    Suspension .. 650 milliGRAM(s) Oral every 6 hours PRN    Anticoagulation:  heparin   Injectable 5000 Unit(s) SubCutaneous every 8 hours    OTHER:  artificial  tears Solution 1 Drop(s) Both EYES every 6 hours  atorvastatin 40 milliGRAM(s) Oral at bedtime  chlorhexidine 0.12% Liquid 15 milliLiter(s) Oral Mucosa two times a day  chlorhexidine 4% Liquid 1 Application(s) Topical <User Schedule>  dextrose 50% Injectable 25 Gram(s) IV Push once  dextrose 50% Injectable 12.5 Gram(s) IV Push once  insulin lispro (ADMELOG) corrective regimen sliding scale   SubCutaneous every 6 hours  insulin NPH human recombinant 10 Unit(s) SubCutaneous every 6 hours  labetalol 100 milliGRAM(s) Oral two times a day  sodium chloride 3%  Inhalation 3 milliLiter(s) Inhalation every 6 hours    IVF:  none    CULTURES:  Culture Results:   No growth to date. ( @ 13:25)  Culture Results:   No growth to date. ( @ 13:25)    RADIOLOGY & ADDITIONAL TESTS:  no new imaging       HPI:  Patient is a 61 year old male that developed headache, dizziness, and vomiting.  CTH done showed posterior fossa hemorrhage.  Admitted to the neurosurgery service for further management.    OVERNIGHT EVENTS:  Spiked fever yesterday, improved with tylenol.  On po vanco for c. diff, ID following.  Tolerating tube feeds.      Vital Signs Last 24 Hrs  T(C): 36.9 (2021 04:11), Max: 39.4 (2021 00:01)  T(F): 98.4 (2021 04:11), Max: 102.9 (2021 00:01)  HR: 87 (2021 04:11) (87 - 105)  BP: 125/77 (2021 04:11) (112/72 - 151/75)  BP(mean): --  RR: 18 (2021 04:11) (18 - 20)  SpO2: 99% (2021 04:11) (95% - 99%)    I&O's Detail    2021 07:01  -  2021 07:00  --------------------------------------------------------  IN:  Total IN: 0 mL    OUT:    Intermittent Catheterization - Urethral (mL): 1800 mL  Total OUT: 1800 mL    Total NET: -1800 mL        I&O's Summary    2021 07:01  -  2021 07:00  --------------------------------------------------------  IN: 0 mL / OUT: 1800 mL / NET: -1800 mL        PHYSICAL EXAM:  Neurological: exam waxes and wanes, intermittently opens eyes to voice, non-verbal, pupils 2mm and non-reactive, intermittently follows some simple commands (left hand thumbs up, wiggles toes b/l), RUE 0/5, LUE 0-1/5, RLE wiggles toes, LLE 1-2/5    Cardiovascular: +s1, s2  Respiratory: clear to auscultation b/l, +trach  Gastrointestinal: soft, non-distended, non-tender, +peg  Genitourinary: +intermittent straight cath  Extremities: +dp/pt pulses palpable b/l  Incision/Wound: crani incisions c/d/i w/ staples    TUBES/LINES:  [x] peg    DIET:  [x] tube feeds (glucerna 1.2 @ 80) via peg      LABS:                        7.4    11.13 )-----------( 431      ( 2021 09:09 )             22.9     1127    137  |  98  |  26<H>  ----------------------------<  170<H>  4.5   |  25  |  1.10    Ca    8.3<L>      2021 05:56  Phos  4.0       Mg     2.2             Urinalysis Basic - ( 2021 09:59 )    Color: Light Yellow / Appearance: Clear / S.019 / pH: x  Gluc: x / Ketone: Negative  / Bili: Negative / Urobili: Negative   Blood: x / Protein: 100 / Nitrite: Negative   Leuk Esterase: Negative / RBC: 6 /hpf / WBC 4 /HPF   Sq Epi: x / Non Sq Epi: 2 /hpf / Bacteria: Negative          CAPILLARY BLOOD GLUCOSE      POCT Blood Glucose.: 193 mg/dL (2021 05:08)  POCT Blood Glucose.: 198 mg/dL (2021 23:15)  POCT Blood Glucose.: 217 mg/dL (2021 21:32)  POCT Blood Glucose.: 160 mg/dL (2021 16:35)  POCT Blood Glucose.: 180 mg/dL (2021 11:48)          Allergies    penicillin (Other)          MEDICATIONS:  Antibiotics:  vancomycin    Solution 125 milliGRAM(s) Oral every 6 hours    Neuro:  acetaminophen    Suspension .. 650 milliGRAM(s) Oral every 6 hours PRN    Anticoagulation:  heparin   Injectable 5000 Unit(s) SubCutaneous every 8 hours    OTHER:  artificial  tears Solution 1 Drop(s) Both EYES every 6 hours  atorvastatin 40 milliGRAM(s) Oral at bedtime  chlorhexidine 0.12% Liquid 15 milliLiter(s) Oral Mucosa two times a day  chlorhexidine 4% Liquid 1 Application(s) Topical <User Schedule>  dextrose 50% Injectable 25 Gram(s) IV Push once  dextrose 50% Injectable 12.5 Gram(s) IV Push once  insulin lispro (ADMELOG) corrective regimen sliding scale   SubCutaneous every 6 hours  insulin NPH human recombinant 10 Unit(s) SubCutaneous every 6 hours  labetalol 100 milliGRAM(s) Oral two times a day  sodium chloride 3%  Inhalation 3 milliLiter(s) Inhalation every 6 hours    IVF:  none    CULTURES:  Culture Results:   No growth to date. ( @ 13:25)  Culture Results:   No growth to date. ( @ 13:25)    RADIOLOGY & ADDITIONAL TESTS:  no new imaging       HPI:  Patient is a 61 year old male that developed headache, dizziness, and vomiting.  CTH done showed posterior fossa hemorrhage.  Admitted to the neurosurgery service for further management.    OVERNIGHT EVENTS:  Spiked fever yesterday, improved with tylenol.  On po vanco for c. diff, ID following.  Tolerating tube feeds.      Vital Signs Last 24 Hrs  T(C): 36.9 (2021 04:11), Max: 39.4 (2021 00:01)  T(F): 98.4 (2021 04:11), Max: 102.9 (2021 00:01)  HR: 87 (2021 04:11) (87 - 105)  BP: 125/77 (2021 04:11) (112/72 - 151/75)  BP(mean): --  RR: 18 (2021 04:11) (18 - 20)  SpO2: 99% (2021 04:11) (95% - 99%)    I&O's Detail    2021 07:01  -  2021 07:00  --------------------------------------------------------  IN:  Total IN: 0 mL    OUT:    Intermittent Catheterization - Urethral (mL): 1800 mL  Total OUT: 1800 mL    Total NET: -1800 mL        I&O's Summary    2021 07:01  -  2021 07:00  --------------------------------------------------------  IN: 0 mL / OUT: 1800 mL / NET: -1800 mL        PHYSICAL EXAM:  Neurological: exam waxes and wanes, intermittently opens eyes to voice, non-verbal, pupils 2mm and non-reactive, intermittently follows some simple commands (left hand thumbs up, wiggles toes b/l), RUE 0/5, LUE 0-1/5, RLE wiggles toes, LLE 1-2/5    Cardiovascular: +s1, s2  Respiratory: clear to auscultation b/l, +trach  Gastrointestinal: soft, non-distended, non-tender, +peg  Genitourinary: +intermittent straight cath  Extremities: +dp/pt pulses palpable b/l  Incision/Wound: crani incisions c/d/i w/ staples    TUBES/LINES:  [x] peg  [x] rectal tube    DIET:  [x] tube feeds (glucerna 1.2 @ 80) via peg      LABS:                        7.4    11.13 )-----------( 431      ( 2021 09:09 )             22.9     11-27    137  |  98  |  26<H>  ----------------------------<  170<H>  4.5   |  25  |  1.10    Ca    8.3<L>      2021 05:56  Phos  4.0     11  Mg     2.2             Urinalysis Basic - ( 2021 09:59 )    Color: Light Yellow / Appearance: Clear / S.019 / pH: x  Gluc: x / Ketone: Negative  / Bili: Negative / Urobili: Negative   Blood: x / Protein: 100 / Nitrite: Negative   Leuk Esterase: Negative / RBC: 6 /hpf / WBC 4 /HPF   Sq Epi: x / Non Sq Epi: 2 /hpf / Bacteria: Negative          CAPILLARY BLOOD GLUCOSE      POCT Blood Glucose.: 193 mg/dL (2021 05:08)  POCT Blood Glucose.: 198 mg/dL (2021 23:15)  POCT Blood Glucose.: 217 mg/dL (2021 21:32)  POCT Blood Glucose.: 160 mg/dL (2021 16:35)  POCT Blood Glucose.: 180 mg/dL (2021 11:48)          Allergies    penicillin (Other)          MEDICATIONS:  Antibiotics:  vancomycin    Solution 125 milliGRAM(s) Oral every 6 hours    Neuro:  acetaminophen    Suspension .. 650 milliGRAM(s) Oral every 6 hours PRN    Anticoagulation:  heparin   Injectable 5000 Unit(s) SubCutaneous every 8 hours    OTHER:  artificial  tears Solution 1 Drop(s) Both EYES every 6 hours  atorvastatin 40 milliGRAM(s) Oral at bedtime  chlorhexidine 0.12% Liquid 15 milliLiter(s) Oral Mucosa two times a day  chlorhexidine 4% Liquid 1 Application(s) Topical <User Schedule>  dextrose 50% Injectable 25 Gram(s) IV Push once  dextrose 50% Injectable 12.5 Gram(s) IV Push once  insulin lispro (ADMELOG) corrective regimen sliding scale   SubCutaneous every 6 hours  insulin NPH human recombinant 10 Unit(s) SubCutaneous every 6 hours  labetalol 100 milliGRAM(s) Oral two times a day  sodium chloride 3%  Inhalation 3 milliLiter(s) Inhalation every 6 hours    IVF:  none    CULTURES:  Culture Results:   No growth to date. ( @ 13:25)  Culture Results:   No growth to date. ( @ 13:25)    RADIOLOGY & ADDITIONAL TESTS:  no new imaging

## 2021-11-28 NOTE — PROGRESS NOTE ADULT - SUBJECTIVE AND OBJECTIVE BOX
Chief Complaint:  Patient is a 61y old  Male who presents with a chief complaint of AMS (17 Nov 2021 13:07)          Interval Events:   no events    Hospital Medications:        Review of Systems:  General:  No wt loss, fevers, chills, night sweats, fatigue,   Eyes:  Good vision, no reported pain  ENT:  No sore throat, pain, runny nose, dysphagia  CV:  No pain, palpitations, hypo/hypertension  Resp:  No dyspnea, cough, tachypnea, wheezing  GI:  See HPI  :  No pain, bleeding, incontinence, nocturia  Muscle:  No pain, weakness  Neuro:  No weakness, tingling, memory problems  Psych:  No fatigue, insomnia, mood problems, depression  Endocrine:  No polyuria, polydipsia, cold/heat intolerance  Heme:  No petechiae, ecchymosis, easy bruisability  Integumentary:  No rash, edema    PHYSICAL EXAM:   Vital Signs Last 24 Hrs  T(C): 37.7 (28 Nov 2021 09:50), Max: 39.4 (28 Nov 2021 00:01)  T(F): 99.8 (28 Nov 2021 09:50), Max: 102.9 (28 Nov 2021 00:01)  HR: 91 (28 Nov 2021 09:50) (87 - 105)  BP: 128/75 (28 Nov 2021 09:50) (112/72 - 151/75)  BP(mean): --  RR: 18 (28 Nov 2021 09:50) (18 - 19)  SpO2: 100% (28 Nov 2021 09:50) (95% - 100%)    PHYSICAL EXAM:     GENERAL:  Appears stated age, well-groomed, well-nourished, no distress  HEENT:  NC/AT,  conjunctivae anicteric, clear and pink,   NECK: supple, trachea midline  CHEST:  Full & symmetric excursion, no increased effort, breath sounds clear  HEART:  Regular rhythm, no JVD  ABDOMEN:  Soft, non-tender, non-distended, normoactive bowel sounds,  no masses , no hepatosplenomegaly  EXTREMITIES:  no cyanosis,clubbing or edema  SKIN:  No rash, erythema, or, ecchymoses, no jaundice  NEURO:  Alert, non-focal, no asterixis  PSYCH: Appropriate affect, oriented to place and time  RECTAL: Deferred      LABS Personally reviewed by me:                        7.6    9.57  )-----------( 432      ( 22 Nov 2021 20:44 )             24.2     Mean Cell Volume: 96.4 fl (11-22-21 @ 20:44)    11-22    139  |  103  |  31<H>  ----------------------------<  186<H>  4.5   |  27  |  1.06    Ca    8.2<L>      22 Nov 2021 20:44  Phos  2.3     11-22  Mg     2.2     11-22        PT/INR - ( 22 Nov 2021 18:00 )   PT: 13.3 sec;   INR: 1.11 ratio         PTT - ( 22 Nov 2021 18:00 )  PTT:30.8 sec                            7.6    9.57  )-----------( 432      ( 22 Nov 2021 20:44 )             24.2                         7.3    10.18 )-----------( 388      ( 21 Nov 2021 22:18 )             22.9                         7.8    10.72 )-----------( 362      ( 20 Nov 2021 21:46 )             24.1       Imaging personally reviewed by me:

## 2021-11-28 NOTE — PROGRESS NOTE ADULT - THIS PATIENT HAS THE FOLLOWING CONDITION(S)/DIAGNOSES ON THIS ADMISSION:
Encephalopathy acute post operative blood loss anemia/Encephalopathy/Brain Compression / Herniation/Acute Blood Loss Anemia

## 2021-11-29 LAB
ANION GAP SERPL CALC-SCNC: 11 MMOL/L — SIGNIFICANT CHANGE UP (ref 5–17)
BUN SERPL-MCNC: 29 MG/DL — HIGH (ref 7–23)
CALCIUM SERPL-MCNC: 8.6 MG/DL — SIGNIFICANT CHANGE UP (ref 8.4–10.5)
CHLORIDE SERPL-SCNC: 96 MMOL/L — SIGNIFICANT CHANGE UP (ref 96–108)
CO2 SERPL-SCNC: 28 MMOL/L — SIGNIFICANT CHANGE UP (ref 22–31)
CREAT SERPL-MCNC: 0.96 MG/DL — SIGNIFICANT CHANGE UP (ref 0.5–1.3)
CULTURE RESULTS: NO GROWTH — SIGNIFICANT CHANGE UP
CULTURE RESULTS: SIGNIFICANT CHANGE UP
GLUCOSE SERPL-MCNC: 184 MG/DL — HIGH (ref 70–99)
HCT VFR BLD CALC: 23.4 % — LOW (ref 39–50)
HGB BLD-MCNC: 7.5 G/DL — LOW (ref 13–17)
MCHC RBC-ENTMCNC: 29.9 PG — SIGNIFICANT CHANGE UP (ref 27–34)
MCHC RBC-ENTMCNC: 32.1 GM/DL — SIGNIFICANT CHANGE UP (ref 32–36)
MCV RBC AUTO: 93.2 FL — SIGNIFICANT CHANGE UP (ref 80–100)
NRBC # BLD: 0 /100 WBCS — SIGNIFICANT CHANGE UP (ref 0–0)
PLATELET # BLD AUTO: 308 K/UL — SIGNIFICANT CHANGE UP (ref 150–400)
POTASSIUM SERPL-MCNC: 4.6 MMOL/L — SIGNIFICANT CHANGE UP (ref 3.5–5.3)
POTASSIUM SERPL-SCNC: 4.6 MMOL/L — SIGNIFICANT CHANGE UP (ref 3.5–5.3)
RBC # BLD: 2.51 M/UL — LOW (ref 4.2–5.8)
RBC # FLD: 13.2 % — SIGNIFICANT CHANGE UP (ref 10.3–14.5)
SARS-COV-2 RNA SPEC QL NAA+PROBE: SIGNIFICANT CHANGE UP
SODIUM SERPL-SCNC: 135 MMOL/L — SIGNIFICANT CHANGE UP (ref 135–145)
SPECIMEN SOURCE: SIGNIFICANT CHANGE UP
WBC # BLD: 8.43 K/UL — SIGNIFICANT CHANGE UP (ref 3.8–10.5)
WBC # FLD AUTO: 8.43 K/UL — SIGNIFICANT CHANGE UP (ref 3.8–10.5)

## 2021-11-29 PROCEDURE — 99223 1ST HOSP IP/OBS HIGH 75: CPT

## 2021-11-29 PROCEDURE — 99233 SBSQ HOSP IP/OBS HIGH 50: CPT

## 2021-11-29 RX ORDER — SODIUM CHLORIDE 9 MG/ML
1000 INJECTION INTRAMUSCULAR; INTRAVENOUS; SUBCUTANEOUS
Refills: 0 | Status: DISCONTINUED | OUTPATIENT
Start: 2021-11-30 | End: 2021-11-30

## 2021-11-29 RX ORDER — ACETAMINOPHEN 500 MG
1000 TABLET ORAL ONCE
Refills: 0 | Status: COMPLETED | OUTPATIENT
Start: 2021-11-29 | End: 2021-11-29

## 2021-11-29 RX ORDER — ACETYLCYSTEINE 200 MG/ML
3 VIAL (ML) MISCELLANEOUS THREE TIMES A DAY
Refills: 0 | Status: COMPLETED | OUTPATIENT
Start: 2021-11-29 | End: 2021-11-30

## 2021-11-29 RX ADMIN — SODIUM CHLORIDE 3 MILLILITER(S): 9 INJECTION INTRAMUSCULAR; INTRAVENOUS; SUBCUTANEOUS at 00:28

## 2021-11-29 RX ADMIN — HEPARIN SODIUM 5000 UNIT(S): 5000 INJECTION INTRAVENOUS; SUBCUTANEOUS at 05:21

## 2021-11-29 RX ADMIN — Medication 2: at 18:41

## 2021-11-29 RX ADMIN — Medication 1 DROP(S): at 05:20

## 2021-11-29 RX ADMIN — Medication 125 MILLIGRAM(S): at 12:02

## 2021-11-29 RX ADMIN — Medication 2: at 12:01

## 2021-11-29 RX ADMIN — NYSTATIN CREAM 1 APPLICATION(S): 100000 CREAM TOPICAL at 18:37

## 2021-11-29 RX ADMIN — HUMAN INSULIN 10 UNIT(S): 100 INJECTION, SUSPENSION SUBCUTANEOUS at 12:02

## 2021-11-29 RX ADMIN — Medication 100 MILLIGRAM(S): at 18:32

## 2021-11-29 RX ADMIN — HUMAN INSULIN 10 UNIT(S): 100 INJECTION, SUSPENSION SUBCUTANEOUS at 00:29

## 2021-11-29 RX ADMIN — CHLORHEXIDINE GLUCONATE 15 MILLILITER(S): 213 SOLUTION TOPICAL at 18:22

## 2021-11-29 RX ADMIN — HUMAN INSULIN 10 UNIT(S): 100 INJECTION, SUSPENSION SUBCUTANEOUS at 05:20

## 2021-11-29 RX ADMIN — HEPARIN SODIUM 5000 UNIT(S): 5000 INJECTION INTRAVENOUS; SUBCUTANEOUS at 14:43

## 2021-11-29 RX ADMIN — Medication 1 DROP(S): at 00:28

## 2021-11-29 RX ADMIN — Medication 1 DROP(S): at 18:21

## 2021-11-29 RX ADMIN — CEFEPIME 100 MILLIGRAM(S): 1 INJECTION, POWDER, FOR SOLUTION INTRAMUSCULAR; INTRAVENOUS at 05:22

## 2021-11-29 RX ADMIN — Medication 1000 MILLIGRAM(S): at 05:05

## 2021-11-29 RX ADMIN — SODIUM CHLORIDE 3 MILLILITER(S): 9 INJECTION INTRAMUSCULAR; INTRAVENOUS; SUBCUTANEOUS at 18:21

## 2021-11-29 RX ADMIN — Medication 1000 MILLIGRAM(S): at 06:11

## 2021-11-29 RX ADMIN — Medication 400 MILLIGRAM(S): at 04:00

## 2021-11-29 RX ADMIN — HUMAN INSULIN 10 UNIT(S): 100 INJECTION, SUSPENSION SUBCUTANEOUS at 18:41

## 2021-11-29 RX ADMIN — HEPARIN SODIUM 5000 UNIT(S): 5000 INJECTION INTRAVENOUS; SUBCUTANEOUS at 21:31

## 2021-11-29 RX ADMIN — Medication 125 MILLIGRAM(S): at 00:30

## 2021-11-29 RX ADMIN — CEFEPIME 100 MILLIGRAM(S): 1 INJECTION, POWDER, FOR SOLUTION INTRAMUSCULAR; INTRAVENOUS at 14:43

## 2021-11-29 RX ADMIN — CHLORHEXIDINE GLUCONATE 15 MILLILITER(S): 213 SOLUTION TOPICAL at 05:22

## 2021-11-29 RX ADMIN — Medication 125 MILLIGRAM(S): at 05:22

## 2021-11-29 RX ADMIN — Medication 100 MILLIGRAM(S): at 05:22

## 2021-11-29 RX ADMIN — Medication 125 MILLIGRAM(S): at 18:22

## 2021-11-29 RX ADMIN — NYSTATIN CREAM 1 APPLICATION(S): 100000 CREAM TOPICAL at 05:22

## 2021-11-29 RX ADMIN — CHLORHEXIDINE GLUCONATE 1 APPLICATION(S): 213 SOLUTION TOPICAL at 21:31

## 2021-11-29 RX ADMIN — SODIUM CHLORIDE 3 MILLILITER(S): 9 INJECTION INTRAMUSCULAR; INTRAVENOUS; SUBCUTANEOUS at 05:21

## 2021-11-29 RX ADMIN — Medication 2: at 05:20

## 2021-11-29 RX ADMIN — Medication 2: at 00:29

## 2021-11-29 RX ADMIN — SODIUM CHLORIDE 3 MILLILITER(S): 9 INJECTION INTRAMUSCULAR; INTRAVENOUS; SUBCUTANEOUS at 12:02

## 2021-11-29 RX ADMIN — ATORVASTATIN CALCIUM 40 MILLIGRAM(S): 80 TABLET, FILM COATED ORAL at 21:31

## 2021-11-29 RX ADMIN — Medication 1 DROP(S): at 12:02

## 2021-11-29 RX ADMIN — CEFEPIME 100 MILLIGRAM(S): 1 INJECTION, POWDER, FOR SOLUTION INTRAMUSCULAR; INTRAVENOUS at 21:31

## 2021-11-29 NOTE — PROGRESS NOTE ADULT - THIS PATIENT HAS THE FOLLOWING CONDITION(S)/DIAGNOSES ON THIS ADMISSION:
acute post operative blood loss anemia/Encephalopathy/Brain Compression / Herniation/Acute Blood Loss Anemia

## 2021-11-29 NOTE — PROGRESS NOTE ADULT - SUBJECTIVE AND OBJECTIVE BOX
CC: f/u for cdif and tracheobronchitis    Patient reports nothing    REVIEW OF SYSTEMS:  All other review of systems negative (Comprehensive ROS)cannot get    Antimicrobials Day #  :  cefepime   IVPB 2000 milliGRAM(s) IV Intermittent every 8 hours  day 2  vancomycin    Solution 125 milliGRAM(s) Oral every 6 hours day 4    Other Medications Reviewed    T(F): 98.4 (21 @ 15:12), Max: 101.3 (21 @ 01:08)  HR: 93 (21 @ 15:12)  BP: 135/77 (21 @ 15:12)  RR: 18 (21 @ 15:12)  SpO2: 98% (21 @ 15:12)  Wt(kg): --    PHYSICAL EXAM:  General: head wounds intact no acute distress  Eyes:  anicteric, no conjunctival injection, no discharge  Oropharynx: no lesions or injection 	  Neck: supple, without adenopathy, trach with now white phlegm  Lungs: clear to auscultation  Heart: regular rate and rhythm; no murmur, rubs or gallops  Abdomen: soft, nondistended, nontender, without mass or organomegaly peg  Skin: no lesions  Extremities: no clubbing, cyanosis, or edema  Neurologic: unresponsive    LAB RESULTS:                        7.5    8.43  )-----------( 308      ( 2021 11:26 )             23.4         135  |  96  |  29<H>  ----------------------------<  184<H>  4.6   |  28  |  0.96    Ca    8.6      2021 11:26  Phos  3.8       Mg     2.3             Urinalysis Basic - ( 2021 12:37 )    Color: Light Yellow / Appearance: Clear / S.020 / pH: x  Gluc: x / Ketone: Negative  / Bili: Negative / Urobili: Negative   Blood: x / Protein: 30 mg/dL / Nitrite: Negative   Leuk Esterase: Negative / RBC: 5 /hpf / WBC 3 /HPF   Sq Epi: x / Non Sq Epi: 1 /hpf / Bacteria: Negative      MICROBIOLOGY:  RECENT CULTURES:   @ 22:23 .Sputum Sputum       Numerous polymorphonuclear leukocytes per low power field  Few Squamous epithelial cells per low power field  Numerous Gram Positive Cocci in Pairs and Chains per oil power field  Moderate Gram Negative Rods per oil power field  Few Gram Positive Rods per oil power field     @ 18:31 Catheterized Catheterized     No growth       @ 14:32 .Blood Blood-Peripheral     No growth to date.       @ 13:25 .Blood Blood     No growth to date.          RADIOLOGY REVIEWED:  < from: Xray Chest 1 View- PORTABLE-Urgent (Xray Chest 1 View- PORTABLE-Urgent .) (21 @ 09:06) >    EXAM:  XR CHEST PORTABLE URGENT 1V                            PROCEDURE DATE:  2021            INTERPRETATION:  HISTORY: Fever. Status post tracheostomy.    TECHNIQUE: A single AP view of the chest was obtained.    COMPARISON: 2021    FINDINGS:  The cardiac silhouette is normal in size. There is a tracheostomy tube in place. There are no focal consolidations or pleural effusions. The hilar and mediastinal structures appear unremarkable. The osseous structures are intact.    IMPRESSION: Clear lungs.    --- End of Report ---            < end of copied text >              Assessment:  Patient admitted 3 weeks ago with sah, underwent suboccipital craniectomy and cervical lami on admit then had aneurysm resected. He required peg and trach, he required vps a few days ago. He had dx of distal dvt off anticoagulation. He has been weaned off vent now on regular floor. HE had tx with cefepime ending about a week ago for pneumonia.  He developed  watery diarrhea, ongoing fever, now known positive for cdif.He has been on enteral vanco now day 4 and still has fever. but it seems to be moderating since starting on cefepime for pulmonary infection. His abdomen is soft and not having overwhelming stool output so I am no longer convinced that cdif is only infection. I was  concerned that his volume of sputum was  a lot higher yesterday and despite neg cxr he could have pulmonary infection so restarted on some cefepime. Fever curve and sputum production are a bit better today.  He is having regular straight cath and ua is not with any major pyuria. wounds are ok. He has a fungal rash on the groin but not a disseminated rash. seems early for vps infection      Plan:  continue po vanco  continue cefepime, will try to limit to another 24 hours for tracheobronchitis then change to ulises nebs  monitor temps, stool output, f/u cultures  aspiration precautions.

## 2021-11-29 NOTE — CONSULT NOTE ADULT - SUBJECTIVE AND OBJECTIVE BOX
Vascular Cardiology Consult Note    SERVICE CONSULT: [phone]              EMAIL [email or web address]   OFFICE [phone]    HPI:    60yo man with PMHx HTN, CAD s/p stent on DAPT, and T2DM (HbA1c 6.5%) who presented about 3 weeks ago with headache. 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 EVD clamped & removed. NSCU course also c/b respiratory failure and dysphagia now s/p trach and PEG on 11/19. S/p VPS placement on 11/23. Completed course of cefepime for enterobacter and pseudomonas pneumonia on 11/21. Transferred out of NSCU on 11/25. Began to spike fevers thereafter with watery output from rectal tube found to be positive for c. diff.    Vascular cardiology has been consulted for recommendations regarding management of below the knee DVT. On 11/15/2021, he had VA duplex b/l (because of high grade fever of 101.2F on the night before), which revealed an acute appearing, below the knee DVT affecting the left soleal vein. Since he was not deemed appropriate for AC considering his ICH, he remained only on DVT PPx with Heparin SC q8H.     On 11/20/2021, VA duplex was repeated, which revealed new thrombosis of the left gastrocnemius, posterior tibial and peroneal veins, new right soleal vein thrombosis, as well as persistence of left soleal vein thrombosis. This demonstrated persistence with no progression on subsequent serial VA duplex performed on 11/24/2021. He remains off any full dose AC, and is only on DVT PPx dose Heparin.    Allergies  penicillin (Other)    MEDICATIONS:  heparin   Injectable 5000 Unit(s) SubCutaneous every 8 hours  labetalol 100 milliGRAM(s) Oral two times a day  cefepime   IVPB 2000 milliGRAM(s) IV Intermittent every 8 hours  vancomycin    Solution 125 milliGRAM(s) Oral every 6 hours  sodium chloride 3%  Inhalation 3 milliLiter(s) Inhalation every 6 hours  acetaminophen    Suspension .. 650 milliGRAM(s) Oral every 6 hours PRN  atorvastatin 40 milliGRAM(s) Oral at bedtime  dextrose 50% Injectable 25 Gram(s) IV Push once  dextrose 50% Injectable 12.5 Gram(s) IV Push once  insulin lispro (ADMELOG) corrective regimen sliding scale   SubCutaneous every 6 hours  insulin NPH human recombinant 10 Unit(s) SubCutaneous every 6 hours  artificial  tears Solution 1 Drop(s) Both EYES every 6 hours  chlorhexidine 0.12% Liquid 15 milliLiter(s) Oral Mucosa two times a day  chlorhexidine 4% Liquid 1 Application(s) Topical <User Schedule>  nystatin Powder 1 Application(s) Topical two times a day    PAST MEDICAL & SURGICAL HISTORY:  HTN (hypertension)  Diabetes mellitus    FAMILY HISTORY:    SOCIAL HISTORY:  unchanged    REVIEW OF SYSTEMS:  CONSTITUTIONAL: No fever  EYES: No eye pain, visual disturbances  ENMT:  No sinus or throat pain  NECK: No pain or stiffness  RESPIRATORY:    CARDIOVASCULAR:    GASTROINTESTINAL: No abdominal or epigastric pain. No nausea, vomiting, or hematemesis; No melena or hematochezia.  GENITOURINARY: No dysuria, hematuria  NEUROLOGICAL: No headaches, memory loss, loss of strength, numbness, or tremors  SKIN:   LYMPH Nodes: No enlarged glands  ENDOCRINE: No heat or cold intolerance; No hair loss  MUSCULOSKELETAL: No joint pain or swelling; No muscle, back, or extremity pain  PSYCHIATRIC: No depression, anxiety  HEME/LYMPH: No easy bruising, or bleeding gums  ALLERY AND IMMUNOLOGIC: No hives or eczema	    [ x] All others negative	  [ ] Unable to obtain    PHYSICAL EXAM:  T(C): 37.3 (11-29-21 @ 06:53), Max: 38.5 (11-29-21 @ 01:08)  HR: 77 (11-29-21 @ 06:53) (77 - 95)  BP: 109/63 (11-29-21 @ 06:53) (107/66 - 139/77)  RR: 20 (11-29-21 @ 06:53) (18 - 20)  SpO2: 96% (11-29-21 @ 06:53) (96% - 99%)  Wt(kg): --  I&O's Summary    28 Nov 2021 07:01  -  29 Nov 2021 07:00  --------------------------------------------------------  IN: 0 mL / OUT: 2900 mL / NET: -2900 mL    29 Nov 2021 07:01  -  29 Nov 2021 10:20  --------------------------------------------------------  IN: 0 mL / OUT: 400 mL / NET: -400 mL        Appearance:  	  HEENT:   Normal oral mucosa, PERRL, EOMI	  Carotid:   Right:    Left:    Lymphatic: No lymphadenopathy  Cardiovascular:    Respiratory:  	  Psychiatry:  AAO x   Gastrointestinal:  Soft, Non-tender, + BS	  Skin: No rashes, No ecchymoses, No cyanosis	  Neurologic:    Extremities:      Vascular Pulse Exam:  Right DP: []palpable []non-palpable []audible      Left DP :   []palpable []non-palpable []audible  Right PT: []palpable [] non-palpable []audible   Left PT:  [] palpable [] non-palpable []audible         Foot Exam:        LABS:	 	    CBC Full  -  ( 28 Nov 2021 09:09 )  WBC Count : 11.13 K/uL  Hemoglobin : 7.4 g/dL  Hematocrit : 22.9 %  Platelet Count - Automated : 431 K/uL  Mean Cell Volume : 93.5 fl  Mean Cell Hemoglobin : 30.2 pg  Mean Cell Hemoglobin Concentration : 32.3 gm/dL  Auto Neutrophil # : 8.63 K/uL  Auto Lymphocyte # : 1.43 K/uL  Auto Monocyte # : 0.66 K/uL  Auto Eosinophil # : 0.32 K/uL  Auto Basophil # : 0.02 K/uL  Auto Neutrophil % : 77.6 %  Auto Lymphocyte % : 12.8 %  Auto Monocyte % : 5.9 %  Auto Eosinophil % : 2.9 %  Auto Basophil % : 0.2 %    11-28    136  |  96  |  29<H>  ----------------------------<  190<H>  4.6   |  28  |  1.09    Ca    8.9      28 Nov 2021 09:09  Phos  3.8     11-28  Mg     2.3     11-28            Assessment:  1. 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 EVD clamped & removed.  - care per neurosurgery.  2. C. difficile diarrhea  --PCR positive. likely 2/2 to Abx use  3. Gram-negative pneumonia.   --s/p 7 day course of cefepime on 11/21; was started on levofloxacin for 11/24 sputum cx with pseudomonas  4. Respiratory failure.   --s/p trach. on trach collar  5. B/l below knee DVTs  --Right soleal vein DVT and persistent left posterior tibial, peroneal, gastrocnemius and soleal vein DVT without proximal propagation on last duplex on 11/24  --On DVT PPx only  8. CAD   --s/p stent and on DAPT  --ASA to be resumed when cleared by NSG; on statin    Plan:  1.          Thank you      Vascular Cardiology Service    Please call with any questions:   Service Line: [phone]  Office [phone]  email:  [email or web address]     Vascular Cardiology Consult Note    SERVICE CONSULT: [phone]              EMAIL [email or web address]   OFFICE [phone]    HPI:    60yo man with PMHx HTN, CAD s/p stent on DAPT, and T2DM (HbA1c 6.5%) who presented about 3 weeks ago with headache. 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 EVD clamped & removed. NSCU course also c/b respiratory failure and dysphagia now s/p trach and PEG on 11/19. S/p VPS placement on 11/23. Completed course of cefepime for enterobacter and pseudomonas pneumonia on 11/21. Transferred out of NSCU on 11/25. Began to spike fevers thereafter with watery output from rectal tube found to be positive for c. diff.    Vascular cardiology has been consulted for recommendations regarding management of below the knee DVT. On 11/15/2021, he had VA duplex b/l (because of high grade fever of 101.2F on the night before), which revealed an acute appearing, below the knee DVT affecting the left soleal vein. Since he was not deemed appropriate for AC considering his ICH, he remained only on DVT PPx with Heparin SC q8H.     On 11/20/2021, VA duplex was repeated, which revealed new thrombosis of the left gastrocnemius, posterior tibial and peroneal veins, new right soleal vein thrombosis, as well as persistence of left soleal vein thrombosis. This demonstrated persistence with no progression on subsequent serial VA duplex performed on 11/24/2021. He remains off any full dose AC, and is only on DVT PPx dose Heparin.    Allergies  penicillin (Other)    MEDICATIONS:  heparin   Injectable 5000 Unit(s) SubCutaneous every 8 hours  labetalol 100 milliGRAM(s) Oral two times a day  cefepime   IVPB 2000 milliGRAM(s) IV Intermittent every 8 hours  vancomycin    Solution 125 milliGRAM(s) Oral every 6 hours  sodium chloride 3%  Inhalation 3 milliLiter(s) Inhalation every 6 hours  acetaminophen    Suspension .. 650 milliGRAM(s) Oral every 6 hours PRN  atorvastatin 40 milliGRAM(s) Oral at bedtime  dextrose 50% Injectable 25 Gram(s) IV Push once  dextrose 50% Injectable 12.5 Gram(s) IV Push once  insulin lispro (ADMELOG) corrective regimen sliding scale   SubCutaneous every 6 hours  insulin NPH human recombinant 10 Unit(s) SubCutaneous every 6 hours  artificial  tears Solution 1 Drop(s) Both EYES every 6 hours  chlorhexidine 0.12% Liquid 15 milliLiter(s) Oral Mucosa two times a day  chlorhexidine 4% Liquid 1 Application(s) Topical <User Schedule>  nystatin Powder 1 Application(s) Topical two times a day    PAST MEDICAL & SURGICAL HISTORY:  HTN (hypertension)  Diabetes mellitus    FAMILY HISTORY:    SOCIAL HISTORY:  unchanged    REVIEW OF SYSTEMS:  -Limited by patient factors; patient is nonverbal (has trach collar)    [] All others negative	  [x] Unable to obtain    PHYSICAL EXAM:  T(C): 37.3 (11-29-21 @ 06:53), Max: 38.5 (11-29-21 @ 01:08)  HR: 77 (11-29-21 @ 06:53) (77 - 95)  BP: 109/63 (11-29-21 @ 06:53) (107/66 - 139/77)  RR: 20 (11-29-21 @ 06:53) (18 - 20)  SpO2: 96% (11-29-21 @ 06:53) (96% - 99%)  Wt(kg): --    I&O's Summary  28 Nov 2021 07:01  -  29 Nov 2021 07:00  --------------------------------------------------------  IN: 0 mL / OUT: 2900 mL / NET: -2900 mL    29 Nov 2021 07:01  -  29 Nov 2021 10:20  --------------------------------------------------------  IN: 0 mL / OUT: 400 mL / NET: -400 mL    Appearance: Frail appearing, nonverbal, with trach and PEG  HEENT:   Normal oral mucosa, PERRL, EOMI	  Lymphatic: No lymphadenopathy  Cardiovascular:  S1, S2, RRR, no RMG  Respiratory:  CTA b/l  Psychiatry:  Nonverbal; nods head upon command  Gastrointestinal:  Soft, Non-tender, + BS	  Skin: No rashes, No ecchymoses, No cyanosis	  Extremities:  B/l LE with no edema, asymmetry of girth, skin changes; extremities are warm with no ulcers or wounds, no phlegmasia    Vascular Pulse Exam:  Right DP: [x]palpable []non-palpable []audible      Left DP :   [x]palpable []non-palpable []audible  Right PT: [x]palpable [] non-palpable []audible   Left PT:  [x] palpable [] non-palpable []audible       LABS:	 	    CBC Full  -  ( 28 Nov 2021 09:09 )  WBC Count : 11.13 K/uL  Hemoglobin : 7.4 g/dL  Hematocrit : 22.9 %  Platelet Count - Automated : 431 K/uL  Mean Cell Volume : 93.5 fl  Mean Cell Hemoglobin : 30.2 pg  Mean Cell Hemoglobin Concentration : 32.3 gm/dL  Auto Neutrophil # : 8.63 K/uL  Auto Lymphocyte # : 1.43 K/uL  Auto Monocyte # : 0.66 K/uL  Auto Eosinophil # : 0.32 K/uL  Auto Basophil # : 0.02 K/uL  Auto Neutrophil % : 77.6 %  Auto Lymphocyte % : 12.8 %  Auto Monocyte % : 5.9 %  Auto Eosinophil % : 2.9 %  Auto Basophil % : 0.2 %    11-28    136  |  96  |  29<H>  ----------------------------<  190<H>  4.6   |  28  |  1.09    Ca    8.9      28 Nov 2021 09:09  Phos  3.8     11-28  Mg     2.3     11-28    Assessment:  1. 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 EVD clamped & removed.  - care per neurosurgery.  2. C. difficile diarrhea  --PCR positive. likely 2/2 to Abx use  3. Gram-negative pneumonia.   --s/p 7 day course of cefepime on 11/21; was started on levofloxacin for 11/24 sputum cx with pseudomonas  4. Respiratory failure.   --s/p trach. on trach collar  5. B/l below knee DVTs  --Right soleal vein DVT and persistent left posterior tibial, peroneal, gastrocnemius and soleal vein DVT without proximal propagation on last duplex on 11/24  --On DVT PPx only  8. CAD   --s/p stent and on DAPT  --ASA to be resumed when cleared by NSG; on statin    Plan:  1. AC when safe from NSG perspective  ***INCOMPLETE***       Thank you      Vascular Cardiology Service    Please call with any questions:   Service Line: [phone]  Office [phone]  email:  [email or web address]     Vascular Cardiology Consult Note    SERVICE CONSULT: [phone]              EMAIL [email or web address]   OFFICE [phone]    HPI:    62yo man with PMHx HTN, CAD s/p stent on DAPT, and T2DM (HbA1c 6.5%) who presented about 3 weeks ago with headache. 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 EVD clamped & removed. NSCU course also c/b respiratory failure and dysphagia now s/p trach and PEG on 11/19. S/p VPS placement on 11/23. Completed course of cefepime for enterobacter and pseudomonas pneumonia on 11/21. Transferred out of NSCU on 11/25. Began to spike fevers thereafter with watery output from rectal tube found to be positive for c. diff.    Vascular cardiology has been consulted for recommendations regarding management of below the knee DVT. On 11/15/2021, he had VA duplex b/l (because of high grade fever of 101.2F on the night before), which revealed an acute appearing, below the knee DVT affecting the left soleal vein. Since he was not deemed appropriate for AC considering his ICH, he remained only on DVT PPx with Heparin SC q8H.     On 11/20/2021, VA duplex was repeated, which revealed new thrombosis of the left gastrocnemius, posterior tibial and peroneal veins, new right soleal vein thrombosis, as well as persistence of left soleal vein thrombosis. This demonstrated persistence with no progression on subsequent serial VA duplex performed on 11/24/2021. He remains off any full dose AC, and is only on DVT PPx dose Heparin.    Allergies  penicillin (Other)    MEDICATIONS:  heparin   Injectable 5000 Unit(s) SubCutaneous every 8 hours  labetalol 100 milliGRAM(s) Oral two times a day  cefepime   IVPB 2000 milliGRAM(s) IV Intermittent every 8 hours  vancomycin    Solution 125 milliGRAM(s) Oral every 6 hours  sodium chloride 3%  Inhalation 3 milliLiter(s) Inhalation every 6 hours  acetaminophen    Suspension .. 650 milliGRAM(s) Oral every 6 hours PRN  atorvastatin 40 milliGRAM(s) Oral at bedtime  dextrose 50% Injectable 25 Gram(s) IV Push once  dextrose 50% Injectable 12.5 Gram(s) IV Push once  insulin lispro (ADMELOG) corrective regimen sliding scale   SubCutaneous every 6 hours  insulin NPH human recombinant 10 Unit(s) SubCutaneous every 6 hours  artificial  tears Solution 1 Drop(s) Both EYES every 6 hours  chlorhexidine 0.12% Liquid 15 milliLiter(s) Oral Mucosa two times a day  chlorhexidine 4% Liquid 1 Application(s) Topical <User Schedule>  nystatin Powder 1 Application(s) Topical two times a day    PAST MEDICAL & SURGICAL HISTORY:  HTN (hypertension)  Diabetes mellitus    FAMILY HISTORY:    SOCIAL HISTORY:  unchanged    REVIEW OF SYSTEMS:  -Limited by patient factors; patient is nonverbal (has trach collar)    [] All others negative	  [x] Unable to obtain    PHYSICAL EXAM:  T(C): 37.3 (11-29-21 @ 06:53), Max: 38.5 (11-29-21 @ 01:08)  HR: 77 (11-29-21 @ 06:53) (77 - 95)  BP: 109/63 (11-29-21 @ 06:53) (107/66 - 139/77)  RR: 20 (11-29-21 @ 06:53) (18 - 20)  SpO2: 96% (11-29-21 @ 06:53) (96% - 99%)  Wt(kg): --    I&O's Summary  28 Nov 2021 07:01  -  29 Nov 2021 07:00  --------------------------------------------------------  IN: 0 mL / OUT: 2900 mL / NET: -2900 mL    29 Nov 2021 07:01  -  29 Nov 2021 10:20  --------------------------------------------------------  IN: 0 mL / OUT: 400 mL / NET: -400 mL    Appearance: Frail appearing, nonverbal, with trach and PEG  HEENT:   Normal oral mucosa, PERRL, EOMI	  Lymphatic: No lymphadenopathy  Cardiovascular:  S1, S2, RRR, no RMG  Respiratory:  CTA b/l  Psychiatry:  Nonverbal; nods head upon command  Gastrointestinal:  Soft, Non-tender, + BS	  Skin: No rashes, No ecchymoses, No cyanosis	  Extremities:  B/l LE with no edema, asymmetry of girth, skin changes; extremities are warm with no ulcers or wounds, no phlegmasia    Vascular Pulse Exam:  Right DP: [x]palpable []non-palpable []audible      Left DP :   [x]palpable []non-palpable []audible  Right PT: [x]palpable [] non-palpable []audible   Left PT:  [x] palpable [] non-palpable []audible       LABS:	 	    CBC Full  -  ( 28 Nov 2021 09:09 )  WBC Count : 11.13 K/uL  Hemoglobin : 7.4 g/dL  Hematocrit : 22.9 %  Platelet Count - Automated : 431 K/uL  Mean Cell Volume : 93.5 fl  Mean Cell Hemoglobin : 30.2 pg  Mean Cell Hemoglobin Concentration : 32.3 gm/dL  Auto Neutrophil # : 8.63 K/uL  Auto Lymphocyte # : 1.43 K/uL  Auto Monocyte # : 0.66 K/uL  Auto Eosinophil # : 0.32 K/uL  Auto Basophil # : 0.02 K/uL  Auto Neutrophil % : 77.6 %  Auto Lymphocyte % : 12.8 %  Auto Monocyte % : 5.9 %  Auto Eosinophil % : 2.9 %  Auto Basophil % : 0.2 %    11-28    136  |  96  |  29<H>  ----------------------------<  190<H>  4.6   |  28  |  1.09    Ca    8.9      28 Nov 2021 09:09  Phos  3.8     11-28  Mg     2.3     11-28    Assessment:  1. 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 EVD clamped & removed.  - care per neurosurgery.  2. C. difficile diarrhea  --PCR positive. likely 2/2 to Abx use  3. Gram-negative pneumonia.   --s/p 7 day course of cefepime on 11/21; was started on levofloxacin for 11/24 sputum cx with pseudomonas  4. Respiratory failure.   --s/p trach. on trach collar  5. B/l below knee DVTs  --Right soleal vein DVT and persistent left posterior tibial, peroneal, gastrocnemius and soleal vein DVT without proximal propagation on last duplex on 11/24  --On DVT PPx only  8. CAD   --s/p stent and on DAPT  --ASA to be resumed when cleared by NSG; on statin    Plan:  1. Continue DVT PPx, as permitted by NSG.  2. Continue serial VA duplexes for DVT propogation surveillance      Thank you      Vascular Cardiology Service    Please call with any questions:   Service Line: [phone]  Office [phone]  email:  [email or web address]     Vascular Cardiology Consult Note    SERVICE CONSULT: [phone]              EMAIL [email or web address]   OFFICE [phone]    HPI:    60yo man with PMHx HTN, CAD s/p stent on DAPT, and T2DM (HbA1c 6.5%) who presented about 3 weeks ago with headache. 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 EVD clamped & removed. NSCU course also c/b respiratory failure and dysphagia now s/p trach and PEG on 11/19. S/p VPS placement on 11/23. Completed course of cefepime for enterobacter and pseudomonas pneumonia on 11/21. Transferred out of NSCU on 11/25. Began to spike fevers thereafter with watery output from rectal tube found to be positive for c. diff.    Vascular cardiology has been consulted for recommendations regarding management of below the knee DVT. On 11/15/2021, he had VA duplex b/l (because of high grade fever of 101.2F on the night before), which revealed an acute appearing, below the knee DVT affecting the left soleal vein. Since he was not deemed appropriate for AC considering his ICH, he remained only on DVT PPx with Heparin SC q8H.     On 11/20/2021, VA duplex was repeated, which revealed new thrombosis of the left gastrocnemius, posterior tibial and peroneal veins, new right soleal vein thrombosis, as well as persistence of left soleal vein thrombosis. This demonstrated persistence with no progression on subsequent serial VA duplex performed on 11/24/2021. He remains off any full dose AC, and is only on DVT PPx dose Heparin.    Allergies  penicillin (Other)    MEDICATIONS:  heparin   Injectable 5000 Unit(s) SubCutaneous every 8 hours  labetalol 100 milliGRAM(s) Oral two times a day  cefepime   IVPB 2000 milliGRAM(s) IV Intermittent every 8 hours  vancomycin    Solution 125 milliGRAM(s) Oral every 6 hours  sodium chloride 3%  Inhalation 3 milliLiter(s) Inhalation every 6 hours  acetaminophen    Suspension .. 650 milliGRAM(s) Oral every 6 hours PRN  atorvastatin 40 milliGRAM(s) Oral at bedtime  dextrose 50% Injectable 25 Gram(s) IV Push once  dextrose 50% Injectable 12.5 Gram(s) IV Push once  insulin lispro (ADMELOG) corrective regimen sliding scale   SubCutaneous every 6 hours  insulin NPH human recombinant 10 Unit(s) SubCutaneous every 6 hours  artificial  tears Solution 1 Drop(s) Both EYES every 6 hours  chlorhexidine 0.12% Liquid 15 milliLiter(s) Oral Mucosa two times a day  chlorhexidine 4% Liquid 1 Application(s) Topical <User Schedule>  nystatin Powder 1 Application(s) Topical two times a day    PAST MEDICAL & SURGICAL HISTORY:  HTN (hypertension)  Diabetes mellitus    FAMILY HISTORY:    SOCIAL HISTORY:  unchanged    REVIEW OF SYSTEMS:  -Limited by patient factors; patient is nonverbal (has trach collar)    [] All others negative	  [x] Unable to obtain    PHYSICAL EXAM:  T(C): 37.3 (11-29-21 @ 06:53), Max: 38.5 (11-29-21 @ 01:08)  HR: 77 (11-29-21 @ 06:53) (77 - 95)  BP: 109/63 (11-29-21 @ 06:53) (107/66 - 139/77)  RR: 20 (11-29-21 @ 06:53) (18 - 20)  SpO2: 96% (11-29-21 @ 06:53) (96% - 99%)  Wt(kg): --    I&O's Summary  28 Nov 2021 07:01  -  29 Nov 2021 07:00  --------------------------------------------------------  IN: 0 mL / OUT: 2900 mL / NET: -2900 mL    29 Nov 2021 07:01  -  29 Nov 2021 10:20  --------------------------------------------------------  IN: 0 mL / OUT: 400 mL / NET: -400 mL    Appearance: Frail appearing, nonverbal, with trach and PEG  HEENT:   Normal oral mucosa, PERRL, EOMI	  Lymphatic: No lymphadenopathy  Cardiovascular:  S1, S2, RRR, no RMG  Respiratory:  CTA b/l  Psychiatry:  Nonverbal; nods head upon command  Gastrointestinal:  Soft, Non-tender, + BS	  Skin: No rashes, No ecchymoses, No cyanosis	  Extremities:  B/l LE with no edema, asymmetry of girth, skin changes; extremities are warm with no ulcers or wounds, no phlegmasia    Vascular Pulse Exam:  Right DP: [x]palpable []non-palpable []audible      Left DP :   [x]palpable []non-palpable []audible  Right PT: [x]palpable [] non-palpable []audible   Left PT:  [x] palpable [] non-palpable []audible       LABS:	 	    CBC Full  -  ( 28 Nov 2021 09:09 )  WBC Count : 11.13 K/uL  Hemoglobin : 7.4 g/dL  Hematocrit : 22.9 %  Platelet Count - Automated : 431 K/uL  Mean Cell Volume : 93.5 fl  Mean Cell Hemoglobin : 30.2 pg  Mean Cell Hemoglobin Concentration : 32.3 gm/dL  Auto Neutrophil # : 8.63 K/uL  Auto Lymphocyte # : 1.43 K/uL  Auto Monocyte # : 0.66 K/uL  Auto Eosinophil # : 0.32 K/uL  Auto Basophil # : 0.02 K/uL  Auto Neutrophil % : 77.6 %  Auto Lymphocyte % : 12.8 %  Auto Monocyte % : 5.9 %  Auto Eosinophil % : 2.9 %  Auto Basophil % : 0.2 %    11-28    136  |  96  |  29<H>  ----------------------------<  190<H>  4.6   |  28  |  1.09    Ca    8.9      28 Nov 2021 09:09  Phos  3.8     11-28  Mg     2.3     11-28    Assessment:  1. 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 EVD clamped & removed.  - care per neurosurgery.  2. C. difficile diarrhea  --PCR positive. likely 2/2 to Abx use  3. Gram-negative pneumonia.   --s/p 7 day course of cefepime on 11/21; was started on levofloxacin for 11/24 sputum cx with pseudomonas  4. Respiratory failure.   --s/p trach. on trach collar  5. B/l below knee DVTs  --Right soleal vein DVT and persistent left posterior tibial, peroneal, gastrocnemius and soleal vein DVT without proximal propagation on last duplex on 11/24  --On DVT PPx only  8. CAD   --s/p stent and on DAPT  --ASA to be resumed when cleared by NSG; on statin    Plan:  1. Continue DVT PPx, as permitted by NSG.  2. Continue serial b/l LE VA duplexes for DVT propagation surveillance; please order next one on 12/1.    Thank you      Vascular Cardiology Service    Please call with any questions:   Service Line: [phone]  Office [phone]  email:  [email or web address]

## 2021-11-29 NOTE — PROGRESS NOTE ADULT - ASSESSMENT
61 year old man with respiratory failure d/t ICH    1. ICH  -per neurosurgery, s/p EVD  -for  shunt    2. Respiratory failure  -for tracheostomy, will require long term enteral nutrition  -s/p PEG, tolerating feeds  - aspiration precautions     3. Enterobacter PNA  -s/p course of antibiotics     4. afib with RVR    5. Anemia, no overt GI bleed. EGD unremarkable.  -trend [facility]  Gastroenterology and Hepatology  [address] [place], [place]  Office: [phone]  Cell: [phone]

## 2021-11-29 NOTE — CHART NOTE - NSCHARTNOTEFT_GEN_A_CORE
Nutrition Follow Up Note  Patient seen for: Nutrition Follow Up    Chart reviewed, events noted. "Pt is a 60 yo M with PMH: HTN, DM. CTH showed large posterior fossa bleed with IVH/SAH/hydro. 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 EVD clamped . EVD clamped & removed . S/p insertion of right parietal ventriculoperitoneal shunt (Certas @ 4) on 2021, s/p trach, on trach collar, C. difficile diarrhea, PCR positive. likely 2/2 to Abx use. S/p 7 day course of cefepime on ; was started on levofloxacin for  sputum cx with pseudomonas."       Source: [] Patient       [x] Medical record        [x] RN         [x] Trach/Vent/BiPAP      Diet Order: Diet, NPO with Tube Feed:   Tube Feeding Modality: Gastrostomy  Glucerna 1.2 Blake (GLUCERNARTH)  Total Volume for 24 Hours (mL): 1920  Continuous  Starting Tube Feed Rate {mL per Hour}: 20  Increase Tube Feed Rate by (mL): 10     Every 3 hours  Until Goal Tube Feed Rate (mL per Hour): 80  Tube Feed Duration (in Hours): 24  Tube Feed Start Time: 06:00  Supplement Feeding Modality:  Gastrostomy  Probiotic Yogurt/Smoothie Cans or Servings Per Day:  1       Frequency:  Daily (21 @ 18:55) [Active]    - Is current order appropriate/adequate? [x] Yes  []  No:     EN Order Provides: 1920ml, 2304kcal and 115g protein     - Nutrition-related concerns:   - Pt receiving 250 ml free water.   - Pt continues on antibiotics. On Leon Active twice daily to aid in gut bindu.     GI:  Last BM , rectal tube.   Bowel Regimen? [] Yes   [x] No      Weights: Dosing weight: 190.96 pounds (86.8 kg) on .   Daily Weight in k.6 (-24) bed scale weight, 187.9 pounds (85.4 kg) on .   ?Accuracy of weights, RD took bed scale weight , will continue to monitor weight trend.      Nutritionally Pertinent Medications: Sodium Chloride 3% inhalation, Vancoycin, Humulin N, Admelog, Lipitor.     Labs: : Hgb <L> 7.4; Hct <L> 22.9; BUN <H> 29 (); Glucose <H> (), finger sticks 155 (), 193 ().     Skin per nursing documentation: no pressure injuries per flow sheets. Surgical incision suboccipital crani, right groin s/p angio.   Edema per nursing documentation: edema 1+ R/L arm.     Estimated Needs:   [] no change since previous assessment  [x] recalculated:   Energy needs:  25- 30 kcal/kg (1886- 2263 kcal/kg)  Protein needs:  1.2- 1.4 g/kg ( 90.5- 105.6 g/kg)  Fluid needs:  25-30 ml/kg (4896-4009 ml/kg)   Based on  pounds (75.45 kg)    Previous Nutrition Diagnosis: Increased Nutrient Needs  Nutrition Diagnosis is: [x] ongoing Being addressed with __ via tolerated route.     New Nutrition Diagnosis: [x] Not applicable    Nutrition Care Plan:  [x] In Progress  [] Achieved  [] Not applicable    Nutrition Interventions:     Education Provided:       [] Yes:  [x] No:        Recommendations:         [x] Recommend 1.2 at ml/hr x 24 hrs. To provide (based on dosing wt 74.45 kg): ml, kcal (kcal/kg) and g protein (g protein/kg).       [] Discontinue current diet order. Recommend:      [] Add micronutrient supplementation:      [] Continue current micronutrient supplementation:      [] Other:     Monitoring and Evaluation:   Continue to monitor nutritional intake, tolerance to diet prescription, weights, labs, skin integrity.       RD remains available upon request and will follow up per protocol  Lilia Pearce Dietetic Intern Pager #[phone] Nutrition Follow Up Note  Patient seen for: Nutrition Follow Up    Chart reviewed, events noted. "Pt is a 60 yo M with PMH: HTN, DM. CTH showed large posterior fossa bleed with IVH/SAH/hydro. 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 EVD clamped . EVD clamped & removed . S/p insertion of right parietal ventriculoperitoneal shunt (Certas @ 4) on 2021, s/p trach, on trach collar, C. difficile diarrhea, PCR positive. likely 2/2 to Abx use. S/p 7 day course of cefepime on ; was started on levofloxacin for  sputum cx with pseudomonas."       Source: [] Patient     [x] Medical record    [x] RN   Unable to interview.   [x] Trach/Vent/BiPAP      Diet Order: Diet, NPO with Tube Feed:   Tube Feeding Modality: Gastrostomy  Glucerna 1.2 Blake (GLUCERNARTH)  Total Volume for 24 Hours (mL): 1920  Continuous  Starting Tube Feed Rate {mL per Hour}: 20  Increase Tube Feed Rate by (mL): 10     Every 3 hours  Until Goal Tube Feed Rate (mL per Hour): 80  Tube Feed Duration (in Hours): 24  Tube Feed Start Time: 06:00  Supplement Feeding Modality:  Gastrostomy  Probiotic Yogurt/Smoothie Cans or Servings Per Day:  1       Frequency:  Daily (21 @ 18:55) [Active]    - Is current order appropriate/adequate? [] Yes  [X]  No:     EN Order Provides: 1680ml, 2016 kcal, 100.8 g protein and 1352 mL of fluid.     - Nutrition-related concerns:   - "NSCU course, c/b respiratory failure and dysphagia now s/p trach and PEG on ."  -  Pt Began to spike fevers with watery output from rectal tube found to be positive for c. diff .   - Per RN, pt tolerating feeding at goal rate 80 ml/24 hr. Persistent diarrhea continues, currently on Vancomycin.   - Pt receiving 250 ml free water.   - Pt continues on antibiotics. Receiving Leon Active twice daily to aid in gut bindu.       GI:  Last BM , rectal tube.   Bowel Regimen? [] Yes   [x] No      Weights: Dosing weight: 190.96 pounds (86.8 kg) on .   Daily Weight in k.6 (11-24) bed scale weight, 187.9 pounds (85.4 kg) on .   ?Accuracy of weights, RD took bed scale weight , will continue to monitor weight trend.      Nutritionally Pertinent Medications: Sodium Chloride 3% inhalation, Vancoycin, Humulin N, Admelog, Lipitor.     Labs: : Hgb <L> 7.4; Hct <L> 22.9; BUN <H> 29 (); Glucose <H> (), finger sticks 155 (), 193 ().     Skin per nursing documentation: no pressure injuries per flow sheets. Surgical incision suboccipital crani, right groin s/p angio.   Edema per nursing documentation: edema 1+ R/L arm.     Estimated Needs:   [] no change since previous assessment  [x] recalculated:   Energy needs:  25- 30 kcal/kg (1334-2575 kcal/kg)  Protein needs:  1.2- 1.4 g/kg ( 90.5-105.6 g/kg)  Fluid needs:  25-30 ml/kg (1710-3159 ml/kg)   Based on  pounds (75.45 kg)    Previous Nutrition Diagnosis: Increased Nutrient Needs  Nutrition Diagnosis is: [x] ongoing Being addressed with feedings via tolerated route.     New Nutrition Diagnosis: [x] Not applicable    Nutrition Care Plan:  [x] In Progress  [] Achieved  [] Not applicable    Nutrition Interventions:     Education Provided:       [] Yes:  [x] No:        Recommendations:         [x] Discontinue current diet order. Recommend Glucerna 1.2 at 70 ml/hr x 24 hrs. To provide (based on dosing wt 74.45 kg): 1680 ml, 2016 kcal (27.07 kcal/kg), 100.8 g protein (1.35 g protein/kg) and 1352 mL of fluid (18 mL/kg).       [X] Monitor GI tolerance. RD to remain available to adjust EN formulary, volume/rate PRN.     Monitoring and Evaluation:   Continue to monitor nutritional intake, tolerance to diet prescription, weights, labs, skin integrity.       RD remains available upon request and will follow up per protocol  Lilia Pearce Dietetic Intern Pager #[phone] Nutrition Follow Up Note  Patient seen for: Nutrition Follow Up    Chart reviewed, events noted. "Pt is a 62 yo M with PMH: HTN, DM. CTH showed large posterior fossa bleed with IVH/SAH/hydro. 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 EVD clamped . EVD clamped & removed . S/p insertion of right parietal ventriculoperitoneal shunt (Certas @ 4) on 2021, s/p trach, on trach collar, C. difficile diarrhea, PCR positive. likely 2/2 to Abx use. S/p 7 day course of cefepime on ; was started on levofloxacin for  sputum cx with pseudomonas."       Source: [] Patient     [x] Medical record    [x] RN   Unable to interview.   [x] Trach/Vent/BiPAP      Diet Order: Diet, NPO with Tube Feed:   Tube Feeding Modality: Gastrostomy  Glucerna 1.2 Blake (GLUCERNARTH)  Total Volume for 24 Hours (mL): 1920  Continuous  Starting Tube Feed Rate {mL per Hour}: 20  Increase Tube Feed Rate by (mL): 10     Every 3 hours  Until Goal Tube Feed Rate (mL per Hour): 80  Tube Feed Duration (in Hours): 24  Tube Feed Start Time: 06:00  Supplement Feeding Modality:  Gastrostomy  Probiotic Yogurt/Smoothie Cans or Servings Per Day:  1       Frequency:  Daily (21 @ 18:55) [Active]    - Is current order appropriate/adequate? [] Yes  [X]  No:     EN Order Provides: 1920ml, 2304kcal and 115g protein.    - Nutrition-related concerns:   - "NSCU course, c/b respiratory failure and dysphagia now s/p trach and PEG on ."  -  Pt Began to spike fevers with watery output from rectal tube found to be positive for c. diff .   - Per RN, pt tolerating feeding at goal rate 80 ml/24 hr. Persistent diarrhea continues, currently on Vancomycin.   - Pt receiving 250 ml free water q 6 hrs.   - Pt continues on antibiotics. Receiving Leon Active twice daily to aid in gut bindu.       GI:  Last BM , rectal tube. Of note, per RN pt is having persistent diarrhea.  Bowel Regimen? [] Yes   [x] No      Weights: Dosing weight: 190.96 pounds (86.8 kg) on .   Daily Weight in k.6 (11-24) bed scale weight, 187.9 pounds (85.4 kg) on .   ?Accuracy of weights, RD took bed scale weight , will continue to monitor weight trend.      Nutritionally Pertinent Medications: Vancomycin, Humulin N, Admelog, Lipitor.     Labs: : Hgb <L> 7.4; Hct <L> 22.9; BUN <H> 29 (); Glucose <H>190 (), finger sticks 155 (), 193 ().     Skin per nursing documentation: no pressure injuries per flow sheets. Surgical incision suboccipital crani, right groin s/p angio.   Edema per nursing documentation: edema 1+ R/L arm.     Estimated Needs:   [] no change since previous assessment  [x] recalculated:   Energy needs:  25- 30 kcal/kg (2456-7749 kcal/kg)  Protein needs:  1.2- 1.4 g/kg (99.6-116.2 g/kg)  Fluid needs:  25-30 ml/kg (4463-7798 ml/kg)   Based on upper .6 pounds (83 kg)    Previous Nutrition Diagnosis: Increased Nutrient Needs  Nutrition Diagnosis is: [x] ongoing Being addressed with feedings via tolerated route.     New Nutrition Diagnosis: [x] Not applicable    Nutrition Care Plan:  [x] In Progress  [] Achieved  [] Not applicable    Nutrition Interventions:     Education Provided:       [] Yes:  [x] No:        Recommendations:         [x] Discontinue current diet order. Recommend Glucerna 1.2 at 80 ml/hr x 24 hrs. To provide (based on upper IBW 83 kg): 1920 ml, 2304 kcal (27.8 kcal/kg), 115 g protein (1.38 g protein/kg) and 1545.6 mL of fluid.     [X] Monitor GI tolerance. RD to remain available to adjust EN formulary, volume/rate PRN.     Monitoring and Evaluation:   Continue to monitor nutritional intake, tolerance to diet prescription, weights, labs, skin integrity.       RD remains available upon request and will follow up per protocol  Lilia Pearce Dietetic Intern Pager #[phone] Nutrition Follow Up Note  Patient seen for: Nutrition Follow Up    Chart reviewed, events noted. "Pt is a 60 yo M with PMH: HTN, DM. CTH showed large posterior fossa bleed with IVH/SAH/hydro. 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 EVD clamped . EVD clamped & removed . S/p insertion of right parietal ventriculoperitoneal shunt (Certas @ 4) on 2021, s/p trach, on trach collar, C. difficile diarrhea, PCR positive. likely 2/2 to Abx use. S/p 7 day course of cefepime on ; was started on levofloxacin for  sputum cx with pseudomonas."       Source: [] Patient     [x] Medical record    [x] RN   Unable to interview.   [x] Trach/Vent/BiPAP      Diet Order: Diet, NPO with Tube Feed:   Tube Feeding Modality: Gastrostomy  Glucerna 1.2 Blake (GLUCERNARTH)  Total Volume for 24 Hours (mL): 1920  Continuous  Starting Tube Feed Rate {mL per Hour}: 20  Increase Tube Feed Rate by (mL): 10     Every 3 hours  Until Goal Tube Feed Rate (mL per Hour): 80  Tube Feed Duration (in Hours): 24  Tube Feed Start Time: 06:00  Supplement Feeding Modality:  Gastrostomy  Probiotic Yogurt/Smoothie Cans or Servings Per Day:  1       Frequency:  Daily (21 @ 18:55) [Active]    - Is current order appropriate/adequate? [] Yes  [X]  No:     EN Order Provides: 1920ml, 2304kcal and 115g protein.    - Nutrition-related concerns:   - "NSCU course, c/b respiratory failure and dysphagia now s/p trach and PEG on ."  -  Pt Began to spike fevers with watery output from rectal tube found to be positive for c. diff .   - Per RN, pt tolerating feeding at goal rate 80 ml/24 hr. Persistent diarrhea continues, currently on Vancomycin.   - Pt receiving 250 ml free water q 6 hrs.   - Pt continues on antibiotics. Receiving Leon Active twice daily to aid in gut bindu.       GI:  Per flow sheets, last BM , rectal tube however per RN pt is having persistent diarrhea.  Bowel Regimen? [] Yes   [x] No      Weights: Dosing weight: 190.96 pounds (86.8 kg) on .   Daily Weight in k.6 (-24) bed scale weight, 187.9 pounds (85.4 kg) on .   ?Accuracy of weights, RD took bed scale weight , will continue to monitor weight trend.      Nutritionally Pertinent Medications: Vancomycin, Humulin N, Admelog, Lipitor.     Labs: : Hgb <L> 7.4; Hct <L> 22.9; BUN <H> 29 (); Glucose <H>190 (), finger sticks 155 (), 193 ().     Skin per nursing documentation: no pressure injuries per flow sheets. Surgical incision suboccipital crani, right groin s/p angio.   Edema per nursing documentation: edema 1+ R/L arm.     Estimated Needs:   [] no change since previous assessment  [x] recalculated:   Energy needs:  25- 30 kcal/kg (8558-5614 kcal/kg)  Protein needs:  1.2- 1.4 g/kg (99.6-116.2 g/kg)  Fluid needs:  25-30 ml/kg (3866-2741 ml/kg)   Based on upper .6 pounds (83 kg)    Previous Nutrition Diagnosis: Increased Nutrient Needs  Nutrition Diagnosis is: [x] ongoing Being addressed with feedings via tolerated route.     New Nutrition Diagnosis: [x] Not applicable    Nutrition Care Plan:  [x] In Progress  [] Achieved  [] Not applicable    Nutrition Interventions:     Education Provided:       [] Yes:  [x] No:        Recommendations:         [x] Discontinue current diet order. Recommend Glucerna 1.2 at 80 ml/hr x 24 hrs. To provide (based on upper IBW 83 kg): 1920 ml, 2304 kcal (27.8 kcal/kg), 115 g protein (1.38 g protein/kg) and 1545.6 mL of fluid, defer additional fluids to team.     [X] Monitor GI tolerance. RD to remain available to adjust EN formulary, volume/rate PRN.     Monitoring and Evaluation:   Continue to monitor nutritional intake, tolerance to diet prescription, weights, labs, skin integrity.       RD remains available upon request and will follow up per protocol  Lilia Rodiles Dietetic Intern Pager #[phone]

## 2021-11-29 NOTE — PROGRESS NOTE ADULT - PROBLEM SELECTOR PROBLEM 2
Pt would like to discuss a medication she would like called in for a yeast infection C. difficile diarrhea

## 2021-11-29 NOTE — PROGRESS NOTE ADULT - SUBJECTIVE AND OBJECTIVE BOX
Chief Complaint:  Patient is a 61y old  Male who presents with a chief complaint of AMS (17 Nov 2021 13:07)          Interval Events:   no events    Hospital Medications:        Review of Systems:  General:  No wt loss, fevers, chills, night sweats, fatigue,   Eyes:  Good vision, no reported pain  ENT:  No sore throat, pain, runny nose, dysphagia  CV:  No pain, palpitations, hypo/hypertension  Resp:  No dyspnea, cough, tachypnea, wheezing  GI:  See HPI  :  No pain, bleeding, incontinence, nocturia  Muscle:  No pain, weakness  Neuro:  No weakness, tingling, memory problems  Psych:  No fatigue, insomnia, mood problems, depression  Endocrine:  No polyuria, polydipsia, cold/heat intolerance  Heme:  No petechiae, ecchymosis, easy bruisability  Integumentary:  No rash, edema    PHYSICAL EXAM:   Vital Signs Last 24 Hrs  Vital Signs Last 24 Hrs  T(C): 37.4 (29 Nov 2021 11:20), Max: 38.5 (29 Nov 2021 01:08)  T(F): 99.3 (29 Nov 2021 11:20), Max: 101.3 (29 Nov 2021 01:08)  HR: 83 (29 Nov 2021 11:20) (77 - 95)  BP: 125/73 (29 Nov 2021 11:20) (107/66 - 139/77)  BP(mean): --  RR: 20 (29 Nov 2021 11:20) (18 - 20)  SpO2: 96% (29 Nov 2021 11:20) (96% - 99%)  PHYSICAL EXAM:     GENERAL:  Appears stated age, well-groomed, well-nourished, no distress  HEENT:  NC/AT,  conjunctivae anicteric, clear and pink,   NECK: supple, trachea midline  CHEST:  Full & symmetric excursion, no increased effort, breath sounds clear  HEART:  Regular rhythm, no JVD  ABDOMEN:  Soft, non-tender, non-distended, normoactive bowel sounds,  no masses , no hepatosplenomegaly  EXTREMITIES:  no cyanosis,clubbing or edema  SKIN:  No rash, erythema, or, ecchymoses, no jaundice  NEURO:  Alert, non-focal, no asterixis  PSYCH: Appropriate affect, oriented to place and time  RECTAL: Deferred      LABS Personally reviewed by me:                        7.6    9.57  )-----------( 432      ( 22 Nov 2021 20:44 )             24.2     Mean Cell Volume: 96.4 fl (11-22-21 @ 20:44)    11-22    139  |  103  |  31<H>  ----------------------------<  186<H>  4.5   |  27  |  1.06    Ca    8.2<L>      22 Nov 2021 20:44  Phos  2.3     11-22  Mg     2.2     11-22        PT/INR - ( 22 Nov 2021 18:00 )   PT: 13.3 sec;   INR: 1.11 ratio         PTT - ( 22 Nov 2021 18:00 )  PTT:30.8 sec                            7.6    9.57  )-----------( 432      ( 22 Nov 2021 20:44 )             24.2                         7.3    10.18 )-----------( 388      ( 21 Nov 2021 22:18 )             22.9                         7.8    10.72 )-----------( 362      ( 20 Nov 2021 21:46 )             24.1       Imaging personally reviewed by me:

## 2021-11-29 NOTE — PROGRESS NOTE ADULT - ASSESSMENT
62 yo male with PMH of HTN, CAD s/p stent on DAPT, T2DM who complained of headache and subsequently become unresponsive. 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 EVD clamped & removed.    NSCU course also c/b respiratory failure and dysphagia now s/p trach and PEG on 11/19. S/p VPS placement on 11/23. Completed course of cefepime for enterobacter and pseudomonas pneumonia on 11/21. Transferred out of NSCU on 11/25. Began to spike fevers with watery output from rectal tube found to be positive for c. diff, PO vanco started on 11/26.     Persistent fever, increased secretion, sputum colonized by Pseudomonas, Cefepime restarted on 11/28.     Currently in bed, difficult to arouse, dyspneic.    60 yo male with PMH of HTN, CAD s/p stent on DAPT, T2DM who complained of headache and subsequently become unresponsive. 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 EVD clamped & removed.    NSCU course also c/b respiratory failure and dysphagia now s/p trach and PEG on 11/19. S/p VPS placement on 11/23. Completed course of cefepime for enterobacter and pseudomonas pneumonia on 11/21. Transferred out of NSCU on 11/25. Began to spike fevers with watery output from rectal tube found to be positive for c. diff, PO vanco started on 11/26.     Persistent fever, increased secretions, sputum colonized by Pseudomonas, Cefepime restarted on 11/28.     Currently in bed, difficult to arouse, dyspneic.

## 2021-11-29 NOTE — PROGRESS NOTE ADULT - SUBJECTIVE AND OBJECTIVE BOX
HPI:  Patient is a 61 year old male that developed headache, dizziness, and vomiting.  CTH done showed posterior fossa hemorrhage.  Admitted to the neurosurgery service for further management.    OVERNIGHT EVENTS:  Still intermittently spiking fevers, ID following, cefepime started.  Tolerating tube feeds.  Planned for repeat angio tomorrow.    Vital Signs Last 24 Hrs  T(C): 37.3 (2021 06:53), Max: 38.5 (2021 01:08)  T(F): 99.2 (2021 06:53), Max: 101.3 (2021 01:08)  HR: 77 (2021 06:53) (77 - 95)  BP: 109/63 (2021 06:53) (107/66 - 139/77)  BP(mean): --  RR: 20 (2021 06:53) (18 - 20)  SpO2: 96% (2021 06:53) (96% - 100%)    I&O's Detail    2021 07:01  -  2021 07:00  --------------------------------------------------------  IN:  Total IN: 0 mL    OUT:    Indwelling Catheter - Urethral (mL): 1900 mL    Intermittent Catheterization - Urethral (mL): 1000 mL  Total OUT: 2900 mL    Total NET: -2900 mL        I&O's Summary    2021 07:01  -  2021 07:00  --------------------------------------------------------  IN: 0 mL / OUT: 2900 mL / NET: -2900 mL        PHYSICAL EXAM:  Neurological: exam waxes and wanes, intermittently opens eyes to voice, non-verbal, pupils 2mm and non-reactive, intermittently follows some simple commands (left hand thumbs up, wiggles toes b/l), RUE 0/5, LUE 0-1/5, RLE wiggles toes, LLE 1-2/5    Cardiovascular: +s1, s2  Respiratory: clear to auscultation b/l, +trach  Gastrointestinal: soft, non-distended, non-tender, laparoscopic port sites c/d/i w/ steri strips, +peg  Genitourinary: +teixeira  Extremities: +dp/pt pulses palpable b/l  Incision/Wound: crani incisions c/d/i, right groin puncture site c/d/i, no hematoma, no erythema    TUBES/LINES:  [x] peg  [x] rectal tube  [x] teixeira    DIET:  [x] tube feeds (glucerna 1.2 @ 80) via peg      LABS:                        7.4    11.13 )-----------( 431      ( 2021 09:09 )             22.9         136  |  96  |  29<H>  ----------------------------<  190<H>  4.6   |  28  |  1.09    Ca    8.9      2021 09:09  Phos  3.8       Mg     2.3             Urinalysis Basic - ( 2021 12:37 )    Color: Light Yellow / Appearance: Clear / S.020 / pH: x  Gluc: x / Ketone: Negative  / Bili: Negative / Urobili: Negative   Blood: x / Protein: 30 mg/dL / Nitrite: Negative   Leuk Esterase: Negative / RBC: 5 /hpf / WBC 3 /HPF   Sq Epi: x / Non Sq Epi: 1 /hpf / Bacteria: Negative          CAPILLARY BLOOD GLUCOSE      POCT Blood Glucose.: 155 mg/dL (2021 05:02)  POCT Blood Glucose.: 195 mg/dL (2021 00:24)  POCT Blood Glucose.: 165 mg/dL (2021 17:03)  POCT Blood Glucose.: 193 mg/dL (2021 12:00)            Allergies    penicillin (Other)          MEDICATIONS:  Antibiotics:  cefepime   IVPB 2000 milliGRAM(s) IV Intermittent every 8 hours  vancomycin    Solution 125 milliGRAM(s) Oral every 6 hours    Neuro:  acetaminophen    Suspension .. 650 milliGRAM(s) Oral every 6 hours PRN    Anticoagulation:  heparin   Injectable 5000 Unit(s) SubCutaneous every 8 hours    OTHER:  artificial  tears Solution 1 Drop(s) Both EYES every 6 hours  atorvastatin 40 milliGRAM(s) Oral at bedtime  chlorhexidine 0.12% Liquid 15 milliLiter(s) Oral Mucosa two times a day  chlorhexidine 4% Liquid 1 Application(s) Topical <User Schedule>  dextrose 50% Injectable 25 Gram(s) IV Push once  dextrose 50% Injectable 12.5 Gram(s) IV Push once  insulin lispro (ADMELOG) corrective regimen sliding scale   SubCutaneous every 6 hours  insulin NPH human recombinant 10 Unit(s) SubCutaneous every 6 hours  labetalol 100 milliGRAM(s) Oral two times a day  nystatin Powder 1 Application(s) Topical two times a day  sodium chloride 3%  Inhalation 3 milliLiter(s) Inhalation every 6 hours    IVF:  none    CULTURES:  Culture Results:   No growth to date. ( @ 13:25)  Culture Results:   No growth to date. ( @ 13:25)    RADIOLOGY & ADDITIONAL TESTS:  no new imaging

## 2021-11-29 NOTE — PROGRESS NOTE ADULT - PROBLEM SELECTOR PLAN 5
new fungal-appearing rash in b/l groin with right side extending to lower abd and lateral hip  - keep area dry  - continue nystatin powder

## 2021-11-29 NOTE — PROGRESS NOTE ADULT - SUBJECTIVE AND OBJECTIVE BOX
Patient is a 61y old  Male who presents with a chief complaint of brain bleed (2021 16:45)      SUBJECTIVE / OVERNIGHT EVENTS: Pt in bed, difficult to arouse. Tachypneic, gurgling secretions. Rectal tube in place.     MEDICATIONS  (STANDING):  artificial  tears Solution 1 Drop(s) Both EYES every 6 hours  atorvastatin 40 milliGRAM(s) Oral at bedtime  cefepime   IVPB 2000 milliGRAM(s) IV Intermittent every 8 hours  chlorhexidine 0.12% Liquid 15 milliLiter(s) Oral Mucosa two times a day  chlorhexidine 4% Liquid 1 Application(s) Topical <User Schedule>  dextrose 50% Injectable 25 Gram(s) IV Push once  dextrose 50% Injectable 12.5 Gram(s) IV Push once  heparin   Injectable 5000 Unit(s) SubCutaneous every 8 hours  insulin lispro (ADMELOG) corrective regimen sliding scale   SubCutaneous every 6 hours  insulin NPH human recombinant 10 Unit(s) SubCutaneous every 6 hours  labetalol 100 milliGRAM(s) Oral two times a day  nystatin Powder 1 Application(s) Topical two times a day  sodium chloride 3%  Inhalation 3 milliLiter(s) Inhalation every 6 hours  vancomycin    Solution 125 milliGRAM(s) Oral every 6 hours    MEDICATIONS  (PRN):  acetaminophen    Suspension .. 650 milliGRAM(s) Oral every 6 hours PRN Temp greater or equal to 38C (100.4F), Mild Pain (1 - 3)      CAPILLARY BLOOD GLUCOSE      POCT Blood Glucose.: 180 mg/dL (2021 11:20)  POCT Blood Glucose.: 155 mg/dL (2021 05:02)  POCT Blood Glucose.: 195 mg/dL (2021 00:24)  POCT Blood Glucose.: 165 mg/dL (2021 17:03)    I&O's Summary    2021 07:01  -  2021 07:00  --------------------------------------------------------  IN: 0 mL / OUT: 2900 mL / NET: -2900 mL    2021 07:01  -  2021 15:22  --------------------------------------------------------  IN: 0 mL / OUT: 1200 mL / NET: -1200 mL        PHYSICAL EXAM:  T(C): 37.4 (21 @ 11:20), Max: 38.5 (21 @ 01:08)  HR: 83 (21 @ 11:20) (77 - 95)  BP: 125/73 (21 @ 11:20) (107/66 - 139/77)  RR: 20 (21 @ 11:20) (18 - 20)  SpO2: 96% (21 @ 11:20) (96% - 99%)  CONSTITUTIONAL: Tachypneic, difficult to arose  EYES: PERRLA; conjunctiva and sclera clear  ENMT: Tracheostomy in place  NECK: Supple, no palpable masses; no thyromegaly  RESPIRATORY: Tachypneic, gurgling secretions  CARDIOVASCULAR: Tachycardic, regular rhythem  ABDOMEN: Nontender to palpation, normoactive bowel sounds, no rebound/guarding; No hepatosplenomegaly  MUSCULOSKELETAL:  No clubbing or cyanosis of digits; no joint swelling or tenderness to palpation  PSYCH: Difficult to arouse  NEUROLOGY: Unable to assess  SKIN: Groin rash    LABS:                        7.5    8.43  )-----------( 308      ( 2021 11:26 )             23.4         135  |  96  |  29<H>  ----------------------------<  184<H>  4.6   |  28  |  0.96    Ca    8.6      2021 11:26  Phos  3.8       Mg     2.3                 Urinalysis Basic - ( 2021 12:37 )    Color: Light Yellow / Appearance: Clear / S.020 / pH: x  Gluc: x / Ketone: Negative  / Bili: Negative / Urobili: Negative   Blood: x / Protein: 30 mg/dL / Nitrite: Negative   Leuk Esterase: Negative / RBC: 5 /hpf / WBC 3 /HPF   Sq Epi: x / Non Sq Epi: 1 /hpf / Bacteria: Negative        RADIOLOGY & ADDITIONAL TESTS:    Imaging Personally Reviewed:    Consultant(s) Notes Reviewed:      Care Discussed with Consultants/Other Providers: Nsx

## 2021-11-29 NOTE — PROGRESS NOTE ADULT - PROBLEM SELECTOR PLAN 4
sputum culture grew enterobacter and most recently pseudomonas.  - s/p 7 day course of cefepime on 11/21, now restarted

## 2021-11-29 NOTE — PROGRESS NOTE ADULT - ASSESSMENT
HPI:  Patient is a 61 year old male that developed headache, dizziness, and vomiting.  CTH done showed posterior fossa hemorrhage.  Admitted to the neurosurgery service for further management.    PROCEDURE: s/p suboccipital crani for posterior fossa decompression on 11/4/2021, s/p cerebral angiogram showing left PICA aneurysm on 11/5/2021, s/p cerebral angiogram showing left PICA fistula on 11/9/2021, s/p crani for PICA aneurysm resection on 11/10/2021, s/p insertion of right parietal ventriculoperitoneal shunt (Certas @ 4) on 11/23/2021  POD#25, #19, #6  PAD#24, #20    PLAN:  Neuro:   -q4 hour neuro checks  -waxing and waning exam, likely related to intermittent fevers  -tylenol for pain control and fevers  -out of bed with assistance  -pt/ot - subacute rehab upon discharge  -repeat angiogram planned for 11/30/2021    Respiratory:   -trach collar  -3% inhalation    CV:  -keep sbp 100-140  -labetalol for bp control  -atorvastatin for lipid control    Endocrine:   -nph and JOSE MARTIN for glucose control  -keep fingersticks 100-180    Heme/Onc:    -heparin for dvt prophylaxis  -no SCDs as patient has b/l lower extremity dvts  -vascular cardiology consult called - follow up recs  -acute post operative blood loss anemia, stable    Renal:   -electrolytes stable  -teixeira for urinary retention    ID:   -febrile to 101.3, pan cultured yesterday, follow up results  -po vanco for c. diff  -ID following  -cefepime started    GI:   -tube feeds (glucerna 1.2 @ 80) via peg  -rectal tube in place      Spectra #[id number]

## 2021-11-30 LAB
-  AMIKACIN: SIGNIFICANT CHANGE UP
-  AZTREONAM: SIGNIFICANT CHANGE UP
-  CEFEPIME: SIGNIFICANT CHANGE UP
-  CEFTAZIDIME: SIGNIFICANT CHANGE UP
-  CIPROFLOXACIN: SIGNIFICANT CHANGE UP
-  GENTAMICIN: SIGNIFICANT CHANGE UP
-  IMIPENEM: SIGNIFICANT CHANGE UP
-  LEVOFLOXACIN: SIGNIFICANT CHANGE UP
-  MEROPENEM: SIGNIFICANT CHANGE UP
-  PIPERACILLIN/TAZOBACTAM: SIGNIFICANT CHANGE UP
-  TOBRAMYCIN: SIGNIFICANT CHANGE UP
ANION GAP SERPL CALC-SCNC: 12 MMOL/L — SIGNIFICANT CHANGE UP (ref 5–17)
APTT BLD: 29.9 SEC — SIGNIFICANT CHANGE UP (ref 27.5–35.5)
BASOPHILS # BLD AUTO: 0.02 K/UL — SIGNIFICANT CHANGE UP (ref 0–0.2)
BASOPHILS NFR BLD AUTO: 0.3 % — SIGNIFICANT CHANGE UP (ref 0–2)
BLD GP AB SCN SERPL QL: NEGATIVE — SIGNIFICANT CHANGE UP
BUN SERPL-MCNC: 30 MG/DL — HIGH (ref 7–23)
CALCIUM SERPL-MCNC: 9 MG/DL — SIGNIFICANT CHANGE UP (ref 8.4–10.5)
CHLORIDE SERPL-SCNC: 96 MMOL/L — SIGNIFICANT CHANGE UP (ref 96–108)
CO2 SERPL-SCNC: 27 MMOL/L — SIGNIFICANT CHANGE UP (ref 22–31)
CREAT SERPL-MCNC: 1.06 MG/DL — SIGNIFICANT CHANGE UP (ref 0.5–1.3)
EOSINOPHIL # BLD AUTO: 0.32 K/UL — SIGNIFICANT CHANGE UP (ref 0–0.5)
EOSINOPHIL NFR BLD AUTO: 4.1 % — SIGNIFICANT CHANGE UP (ref 0–6)
GLUCOSE SERPL-MCNC: 97 MG/DL — SIGNIFICANT CHANGE UP (ref 70–99)
HCT VFR BLD CALC: 26.1 % — LOW (ref 39–50)
HGB BLD-MCNC: 8.3 G/DL — LOW (ref 13–17)
IMM GRANULOCYTES NFR BLD AUTO: 0.4 % — SIGNIFICANT CHANGE UP (ref 0–1.5)
INR BLD: 1.13 RATIO — SIGNIFICANT CHANGE UP (ref 0.88–1.16)
LYMPHOCYTES # BLD AUTO: 1.45 K/UL — SIGNIFICANT CHANGE UP (ref 1–3.3)
LYMPHOCYTES # BLD AUTO: 18.4 % — SIGNIFICANT CHANGE UP (ref 13–44)
MCHC RBC-ENTMCNC: 30 PG — SIGNIFICANT CHANGE UP (ref 27–34)
MCHC RBC-ENTMCNC: 31.8 GM/DL — LOW (ref 32–36)
MCV RBC AUTO: 94.2 FL — SIGNIFICANT CHANGE UP (ref 80–100)
METHOD TYPE: SIGNIFICANT CHANGE UP
MONOCYTES # BLD AUTO: 0.61 K/UL — SIGNIFICANT CHANGE UP (ref 0–0.9)
MONOCYTES NFR BLD AUTO: 7.7 % — SIGNIFICANT CHANGE UP (ref 2–14)
NEUTROPHILS # BLD AUTO: 5.46 K/UL — SIGNIFICANT CHANGE UP (ref 1.8–7.4)
NEUTROPHILS NFR BLD AUTO: 69.1 % — SIGNIFICANT CHANGE UP (ref 43–77)
NRBC # BLD: 0 /100 WBCS — SIGNIFICANT CHANGE UP (ref 0–0)
PLATELET # BLD AUTO: 313 K/UL — SIGNIFICANT CHANGE UP (ref 150–400)
POTASSIUM SERPL-MCNC: 4.6 MMOL/L — SIGNIFICANT CHANGE UP (ref 3.5–5.3)
POTASSIUM SERPL-SCNC: 4.6 MMOL/L — SIGNIFICANT CHANGE UP (ref 3.5–5.3)
PROTHROM AB SERPL-ACNC: 13.5 SEC — SIGNIFICANT CHANGE UP (ref 10.6–13.6)
RBC # BLD: 2.77 M/UL — LOW (ref 4.2–5.8)
RBC # FLD: 13.1 % — SIGNIFICANT CHANGE UP (ref 10.3–14.5)
RH IG SCN BLD-IMP: POSITIVE — SIGNIFICANT CHANGE UP
SODIUM SERPL-SCNC: 135 MMOL/L — SIGNIFICANT CHANGE UP (ref 135–145)
WBC # BLD: 7.89 K/UL — SIGNIFICANT CHANGE UP (ref 3.8–10.5)
WBC # FLD AUTO: 7.89 K/UL — SIGNIFICANT CHANGE UP (ref 3.8–10.5)

## 2021-11-30 PROCEDURE — 99232 SBSQ HOSP IP/OBS MODERATE 35: CPT

## 2021-11-30 PROCEDURE — 99233 SBSQ HOSP IP/OBS HIGH 50: CPT

## 2021-11-30 RX ADMIN — ATORVASTATIN CALCIUM 40 MILLIGRAM(S): 80 TABLET, FILM COATED ORAL at 21:52

## 2021-11-30 RX ADMIN — Medication 3 MILLILITER(S): at 13:01

## 2021-11-30 RX ADMIN — SODIUM CHLORIDE 3 MILLILITER(S): 9 INJECTION INTRAMUSCULAR; INTRAVENOUS; SUBCUTANEOUS at 12:57

## 2021-11-30 RX ADMIN — Medication 650 MILLIGRAM(S): at 00:21

## 2021-11-30 RX ADMIN — Medication 2: at 00:20

## 2021-11-30 RX ADMIN — Medication 3 MILLILITER(S): at 21:52

## 2021-11-30 RX ADMIN — SODIUM CHLORIDE 3 MILLILITER(S): 9 INJECTION INTRAMUSCULAR; INTRAVENOUS; SUBCUTANEOUS at 00:21

## 2021-11-30 RX ADMIN — HEPARIN SODIUM 5000 UNIT(S): 5000 INJECTION INTRAVENOUS; SUBCUTANEOUS at 21:51

## 2021-11-30 RX ADMIN — Medication 1 DROP(S): at 05:49

## 2021-11-30 RX ADMIN — CHLORHEXIDINE GLUCONATE 1 APPLICATION(S): 213 SOLUTION TOPICAL at 21:51

## 2021-11-30 RX ADMIN — CEFEPIME 100 MILLIGRAM(S): 1 INJECTION, POWDER, FOR SOLUTION INTRAMUSCULAR; INTRAVENOUS at 12:56

## 2021-11-30 RX ADMIN — HUMAN INSULIN 10 UNIT(S): 100 INJECTION, SUSPENSION SUBCUTANEOUS at 00:21

## 2021-11-30 RX ADMIN — CEFEPIME 100 MILLIGRAM(S): 1 INJECTION, POWDER, FOR SOLUTION INTRAMUSCULAR; INTRAVENOUS at 21:51

## 2021-11-30 RX ADMIN — HUMAN INSULIN 10 UNIT(S): 100 INJECTION, SUSPENSION SUBCUTANEOUS at 13:00

## 2021-11-30 RX ADMIN — Medication 100 MILLIGRAM(S): at 18:32

## 2021-11-30 RX ADMIN — CHLORHEXIDINE GLUCONATE 15 MILLILITER(S): 213 SOLUTION TOPICAL at 18:27

## 2021-11-30 RX ADMIN — HEPARIN SODIUM 5000 UNIT(S): 5000 INJECTION INTRAVENOUS; SUBCUTANEOUS at 13:02

## 2021-11-30 RX ADMIN — Medication 3 MILLILITER(S): at 05:10

## 2021-11-30 RX ADMIN — HUMAN INSULIN 10 UNIT(S): 100 INJECTION, SUSPENSION SUBCUTANEOUS at 23:48

## 2021-11-30 RX ADMIN — HUMAN INSULIN 10 UNIT(S): 100 INJECTION, SUSPENSION SUBCUTANEOUS at 18:29

## 2021-11-30 RX ADMIN — NYSTATIN CREAM 1 APPLICATION(S): 100000 CREAM TOPICAL at 18:30

## 2021-11-30 RX ADMIN — Medication 1 DROP(S): at 23:47

## 2021-11-30 RX ADMIN — Medication 125 MILLIGRAM(S): at 00:20

## 2021-11-30 RX ADMIN — Medication 125 MILLIGRAM(S): at 05:12

## 2021-11-30 RX ADMIN — CEFEPIME 100 MILLIGRAM(S): 1 INJECTION, POWDER, FOR SOLUTION INTRAMUSCULAR; INTRAVENOUS at 05:10

## 2021-11-30 RX ADMIN — Medication 650 MILLIGRAM(S): at 00:44

## 2021-11-30 RX ADMIN — Medication 1 DROP(S): at 00:20

## 2021-11-30 RX ADMIN — Medication 125 MILLIGRAM(S): at 18:28

## 2021-11-30 RX ADMIN — Medication 4: at 12:59

## 2021-11-30 RX ADMIN — CHLORHEXIDINE GLUCONATE 15 MILLILITER(S): 213 SOLUTION TOPICAL at 05:49

## 2021-11-30 RX ADMIN — SODIUM CHLORIDE 3 MILLILITER(S): 9 INJECTION INTRAMUSCULAR; INTRAVENOUS; SUBCUTANEOUS at 05:12

## 2021-11-30 RX ADMIN — Medication 1 DROP(S): at 13:06

## 2021-11-30 RX ADMIN — SODIUM CHLORIDE 3 MILLILITER(S): 9 INJECTION INTRAMUSCULAR; INTRAVENOUS; SUBCUTANEOUS at 18:31

## 2021-11-30 RX ADMIN — Medication 100 MILLIGRAM(S): at 05:11

## 2021-11-30 RX ADMIN — Medication 2: at 23:47

## 2021-11-30 RX ADMIN — Medication 125 MILLIGRAM(S): at 12:57

## 2021-11-30 RX ADMIN — Medication 4: at 18:30

## 2021-11-30 RX ADMIN — HEPARIN SODIUM 5000 UNIT(S): 5000 INJECTION INTRAVENOUS; SUBCUTANEOUS at 05:10

## 2021-11-30 RX ADMIN — Medication 125 MILLIGRAM(S): at 23:26

## 2021-11-30 RX ADMIN — NYSTATIN CREAM 1 APPLICATION(S): 100000 CREAM TOPICAL at 05:49

## 2021-11-30 RX ADMIN — SODIUM CHLORIDE 3 MILLILITER(S): 9 INJECTION INTRAMUSCULAR; INTRAVENOUS; SUBCUTANEOUS at 23:47

## 2021-11-30 RX ADMIN — Medication 1 DROP(S): at 18:27

## 2021-11-30 NOTE — PROGRESS NOTE ADULT - SUBJECTIVE AND OBJECTIVE BOX
Vascular Cardiology  Progress note  EMAIL [email or web address]   OFFICE [phone]    INTERVAL HISTORY:  -No acute events overnight.  -Management for C. Diff infection ongoing.    Allergies  penicillin (Other)    MEDICATIONS:  heparin   Injectable 5000 Unit(s) SubCutaneous every 8 hours  labetalol 100 milliGRAM(s) Oral two times a day  cefepime   IVPB 2000 milliGRAM(s) IV Intermittent every 8 hours  vancomycin    Solution 125 milliGRAM(s) Oral every 6 hours  acetylcysteine 10%  Inhalation 3 milliLiter(s) Inhalation three times a day  sodium chloride 3%  Inhalation 3 milliLiter(s) Inhalation every 6 hours  acetaminophen    Suspension .. 650 milliGRAM(s) Oral every 6 hours PRN  atorvastatin 40 milliGRAM(s) Oral at bedtime  dextrose 50% Injectable 25 Gram(s) IV Push once  dextrose 50% Injectable 12.5 Gram(s) IV Push once  insulin lispro (ADMELOG) corrective regimen sliding scale   SubCutaneous every 6 hours  insulin NPH human recombinant 10 Unit(s) SubCutaneous every 6 hours  artificial  tears Solution 1 Drop(s) Both EYES every 6 hours  chlorhexidine 0.12% Liquid 15 milliLiter(s) Oral Mucosa two times a day  chlorhexidine 4% Liquid 1 Application(s) Topical <User Schedule>  nystatin Powder 1 Application(s) Topical two times a day    PAST MEDICAL & SURGICAL HISTORY:  HTN (hypertension)  Diabetes mellitus    SOCIAL HISTORY:  unchanged    REVIEW OF SYSTEMS:  -Limited 2/2 to patient factors; patient is nonverbal    [x ] Unable to obtain    PHYSICAL EXAM:  T(C): 37.2 (11-30-21 @ 08:57), Max: 38.1 (11-29-21 @ 23:56)  HR: 84 (11-30-21 @ 08:57) (83 - 98)  BP: 120/71 (11-30-21 @ 08:57) (118/72 - 135/77)  RR: 18 (11-30-21 @ 08:57) (18 - 20)  SpO2: 99% (11-30-21 @ 08:57) (95% - 99%)  Wt(kg): --    I&O's Summary    29 Nov 2021 07:01  -  30 Nov 2021 07:00  --------------------------------------------------------  IN: 1380 mL / OUT: 3200 mL / NET: -1820 mL    Appearance: Frail appearing, nonverbal, with trach and PEG  HEENT:   Normal oral mucosa, PERRL, EOMI	  Lymphatic: No lymphadenopathy  Cardiovascular:  S1, S2, RRR, no RMG  Respiratory:  CTA b/l  Psychiatry:  Nonverbal; nods head upon command  Gastrointestinal:  Soft, Non-tender, + BS	  Skin: No rashes, No ecchymoses, No cyanosis	  Extremities:  B/l LE with no edema, asymmetry of girth, skin changes; extremities are warm with no ulcers or wounds, no phlegmasia    Vascular Pulse Exam:  Right DP: [x]palpable []non-palpable []audible      Left DP :   [x]palpable []non-palpable []audible  Right PT: [x]palpable [] non-palpable []audible   Left PT:  [x] palpable [] non-palpable []audible      LABS:	 	    CBC Full  -  ( 30 Nov 2021 06:25 )  WBC Count : 7.89 K/uL  Hemoglobin : 8.3 g/dL  Hematocrit : 26.1 %  Platelet Count - Automated : 313 K/uL  Mean Cell Volume : 94.2 fl  Mean Cell Hemoglobin : 30.0 pg  Mean Cell Hemoglobin Concentration : 31.8 gm/dL  Auto Neutrophil # : 5.46 K/uL  Auto Lymphocyte # : 1.45 K/uL  Auto Monocyte # : 0.61 K/uL  Auto Eosinophil # : 0.32 K/uL  Auto Basophil # : 0.02 K/uL  Auto Neutrophil % : 69.1 %  Auto Lymphocyte % : 18.4 %  Auto Monocyte % : 7.7 %  Auto Eosinophil % : 4.1 %  Auto Basophil % : 0.3 %    11-30    135  |  96  |  30<H>  ----------------------------<  97  4.6   |  27  |  1.06  11-29    135  |  96  |  29<H>  ----------------------------<  184<H>  4.6   |  28  |  0.96    Ca    9.0      30 Nov 2021 06:25  Ca    8.6      29 Nov 2021 11:26    Assessment:  1. 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 EVD clamped & removed.  2. C. difficile diarrhea  --PCR positive. likely 2/2 to Abx use  3. Gram-negative pneumonia.   --s/p 7 day course of cefepime on 11/21; was started on levofloxacin for 11/24 sputum cx with pseudomonas  4. Respiratory failure.   --s/p trach. on trach collar  5. B/l below knee DVTs  --Right soleal vein DVT and persistent left posterior tibial, peroneal, gastrocnemius and soleal vein DVT without proximal propagation on last duplex on 11/24  --On DVT PPx only  8. CAD   --s/p stent and on DAPT  --ASA to be resumed when cleared by NSG; on statin    Plan:  1. Continue DVT PPx, as permitted by NSG.  2. Continue serial b/l LE VA duplexes for DVT propagation surveillance; please order next one on 12/1.    Thank you      Vascular Cardiology Service    Please call with any questions:   Service Line: [phone]  Office [phone]  email:  [email or web address]

## 2021-11-30 NOTE — SPEECH LANGUAGE PATHOLOGY EVALUATION - SLP PERTINENT HISTORY OF CURRENT PROBLEM
Pt p/w code stroke w/ LKW ~0830 am when he c/o HA with n/v, EMS activated found patient to be incoherent, hypertensive 220s and bradycardic 50s and ,eventually unresponsive in ED requiring intubation for GCS/Airway protection, CTH with large posterior fossa bleed w/ IVH, CTA w/ no LVO, large hyperdense focus in 4th vent suggesting active bleed vs. aneurysm, in ED with poor exam, EVD placed, now s/p diagnostic angio with L PICA aneurysm untreated
Pt p/w code stroke w/ LKW ~0830 am when he c/o HA with n/v, EMS activated found patient to be incoherent, hypertensive 220s and bradycardic 50s and ,eventually unresponsive in ED requiring intubation for GCS/Airway protection, CTH with large posterior fossa bleed w/ IVH, CTA w/ no LVO, large hyperdense focus in 4th vent suggesting active bleed vs. aneurysm, in ED with poor exam, EVD placed, now s/p diagnostic angio with L PICA aneurysm untreated

## 2021-11-30 NOTE — PROGRESS NOTE ADULT - ASSESSMENT
HPI:  Patient is a 61 year old male that developed headache, dizziness, and vomiting.  CTH done showed posterior fossa hemorrhage.  Admitted to the neurosurgery service for further management.    PROCEDURE: s/p suboccipital crani for posterior fossa decompression on 11/4/2021, s/p cerebral angiogram showing left PICA aneurysm on 11/5/2021, s/p cerebral angiogram showing left PICA fistula on 11/9/2021, s/p crani for PICA aneurysm resection on 11/10/2021, s/p insertion of right parietal ventriculoperitoneal shunt (Certas @ 4) on 11/23/2021      PLAN:  Neuro:   -q4 hour neuro checks  -waxing and waning exam, likely related to intermittent fevers  -tylenol for pain control and fevers  -out of bed with assistance  -pt/ot - subacute rehab upon discharge  -no repeat angiogram on this admission     Respiratory:   -trach collar  -3% inhalation  - mucomyst     CV:  -keep sbp 100-140  -labetalol for hypertension  -atorvastatin for hyperlipidemia     Endocrine:   -nph and JOSE MARTIN for glucose control  -keep fingersticks 100-180    Heme/Onc:    -heparin for dvt prophylaxis  -no SCDs as patient has b/l lower extremity dvts  -vascular cardiology consult called - follow up recs  -acute post operative blood loss anemia, stable    Renal:   -electrolytes stable  -teixeira for urinary retention    ID:   -low grade fever to 38.1   -po vanco for c. diff  -ID following  -cefepimefor pneumonia     GI:   -tube feeds (glucerna 1.2 @ 80) via peg  -rectal tube in place      Spectra #[id number]

## 2021-11-30 NOTE — PROGRESS NOTE ADULT - ASSESSMENT
62 yo male with PMH of HTN, CAD s/p stent on DAPT, T2DM who complained of headache and subsequently become unresponsive. 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 EVD clamped & removed.    NSCU course also c/b respiratory failure and dysphagia now s/p trach and PEG on 11/19. S/p VPS placement on 11/23. Completed course of cefepime for enterobacter and pseudomonas pneumonia on 11/21. Transferred out of NSCU on 11/25. Began to spike fevers with watery output from rectal tube found to be positive for c. diff, PO vanco started on 11/26.     Persistent fever, increased secretions, sputum colonized by Pseudomonas, Cefepime restarted on 11/28.     Currently in bed, difficult to arouse.

## 2021-11-30 NOTE — PROGRESS NOTE ADULT - SUBJECTIVE AND OBJECTIVE BOX
Patient is a 61y old  Male who presents with a chief complaint of ICH (30 Nov 2021 09:34)      SUBJECTIVE / OVERNIGHT EVENTS: Afebrile overnight. Currently in bed, difficult to arouse.     MEDICATIONS  (STANDING):  acetylcysteine 10%  Inhalation 3 milliLiter(s) Inhalation three times a day  artificial  tears Solution 1 Drop(s) Both EYES every 6 hours  atorvastatin 40 milliGRAM(s) Oral at bedtime  cefepime   IVPB 2000 milliGRAM(s) IV Intermittent every 8 hours  chlorhexidine 0.12% Liquid 15 milliLiter(s) Oral Mucosa two times a day  chlorhexidine 4% Liquid 1 Application(s) Topical <User Schedule>  dextrose 50% Injectable 25 Gram(s) IV Push once  dextrose 50% Injectable 12.5 Gram(s) IV Push once  heparin   Injectable 5000 Unit(s) SubCutaneous every 8 hours  insulin lispro (ADMELOG) corrective regimen sliding scale   SubCutaneous every 6 hours  insulin NPH human recombinant 10 Unit(s) SubCutaneous every 6 hours  labetalol 100 milliGRAM(s) Oral two times a day  nystatin Powder 1 Application(s) Topical two times a day  sodium chloride 3%  Inhalation 3 milliLiter(s) Inhalation every 6 hours  vancomycin    Solution 125 milliGRAM(s) Oral every 6 hours    MEDICATIONS  (PRN):  acetaminophen    Suspension .. 650 milliGRAM(s) Oral every 6 hours PRN Temp greater or equal to 38C (100.4F), Mild Pain (1 - 3)      CAPILLARY BLOOD GLUCOSE      POCT Blood Glucose.: 201 mg/dL (30 Nov 2021 12:46)  POCT Blood Glucose.: 107 mg/dL (30 Nov 2021 05:44)  POCT Blood Glucose.: 162 mg/dL (29 Nov 2021 23:34)  POCT Blood Glucose.: 152 mg/dL (29 Nov 2021 18:39)  POCT Blood Glucose.: 170 mg/dL (29 Nov 2021 17:11)    I&O's Summary    29 Nov 2021 07:01  -  30 Nov 2021 07:00  --------------------------------------------------------  IN: 1380 mL / OUT: 3200 mL / NET: -1820 mL    30 Nov 2021 07:01  -  30 Nov 2021 14:37  --------------------------------------------------------  IN: 0 mL / OUT: 400 mL / NET: -400 mL        PHYSICAL EXAM:  T(C): 37.1 (11-30-21 @ 11:28), Max: 38.1 (11-29-21 @ 23:56)  HR: 90 (11-30-21 @ 11:28) (84 - 98)  BP: 123/74 (11-30-21 @ 11:28) (118/72 - 135/77)  RR: 18 (11-30-21 @ 11:28) (18 - 18)  SpO2: 97% (11-30-21 @ 11:28) (95% - 99%)  CONSTITUTIONAL: In bed, difficult to arouse  EYES: PERRLA; conjunctiva and sclera clear  ENMT: Moist oral mucosa, Tracheostomy in place  NECK: Supple, no palpable masses; no thyromegaly  RESPIRATORY: Dyspneic; coarse breath sounds bilaterally  CARDIOVASCULAR: Irregular rhythm, normal S1 and S2, no murmur/rub/gallop;   ABDOMEN: Nontender to palpation, normoactive bowel sounds, no rebound/guarding; No hepatosplenomegaly  MUSCULOSKELETAL:  No clubbing or cyanosis of digits; no joint swelling or tenderness to palpation  PSYCH: Not responsive  NEUROLOGY: Unable to assess  SKIN: Groin rash    LABS:                        8.3    7.89  )-----------( 313      ( 30 Nov 2021 06:25 )             26.1     11-30    135  |  96  |  30<H>  ----------------------------<  97  4.6   |  27  |  1.06    Ca    9.0      30 Nov 2021 06:25      PT/INR - ( 30 Nov 2021 06:25 )   PT: 13.5 sec;   INR: 1.13 ratio         PTT - ( 30 Nov 2021 06:25 )  PTT:29.9 sec          RADIOLOGY & ADDITIONAL TESTS:    Imaging Personally Reviewed:    Consultant(s) Notes Reviewed:      Care Discussed with Consultants/Other Providers: Carolyn

## 2021-11-30 NOTE — PROGRESS NOTE ADULT - SUBJECTIVE AND OBJECTIVE BOX
CC: f/u for low grade fever, C Diff and tracheobronchitis    Patient reports: he is awake but not verbal, not reliably following commands    REVIEW OF SYSTEMS:  All other review of systems negative (Comprehensive ROS): diarrhea, moderate secretions    Antimicrobials Day #  :  cefepime   IVPB 2000 milliGRAM(s) IV Intermittent every 8 hours day 3  vancomycin    Solution 125 milliGRAM(s) Oral every 6 hours day 5    Other Medications Reviewed  MEDICATIONS  (STANDING):  acetylcysteine 10%  Inhalation 3 milliLiter(s) Inhalation three times a day  artificial  tears Solution 1 Drop(s) Both EYES every 6 hours  atorvastatin 40 milliGRAM(s) Oral at bedtime  cefepime   IVPB 2000 milliGRAM(s) IV Intermittent every 8 hours  chlorhexidine 0.12% Liquid 15 milliLiter(s) Oral Mucosa two times a day  chlorhexidine 4% Liquid 1 Application(s) Topical <User Schedule>  dextrose 50% Injectable 25 Gram(s) IV Push once  dextrose 50% Injectable 12.5 Gram(s) IV Push once  heparin   Injectable 5000 Unit(s) SubCutaneous every 8 hours  insulin lispro (ADMELOG) corrective regimen sliding scale   SubCutaneous every 6 hours  insulin NPH human recombinant 10 Unit(s) SubCutaneous every 6 hours  labetalol 100 milliGRAM(s) Oral two times a day  nystatin Powder 1 Application(s) Topical two times a day  sodium chloride 3%  Inhalation 3 milliLiter(s) Inhalation every 6 hours  vancomycin    Solution 125 milliGRAM(s) Oral every 6 hours    T(F): 99 (21 @ 08:57), Max: 100.6 (21 @ 23:56)  HR: 84 (21 @ 08:57)  BP: 120/71 (21 @ 08:57)  RR: 18 (21 @ 08:57)  SpO2: 99% (21 @ 08:57)  Wt(kg): --    PHYSICAL EXAM:  General: attentive, no acute distress  Eyes:  anicteric, no conjunctival injection, no discharge  Oropharynx: no lesions or injection 	  Neck: supple, trach  Lungs: scattered ronchi  Heart: regular rate and rhythm; no murmur, rubs or gallops  Abdomen: soft, nondistended, nontender, without mass or organomegaly  Skin: no lesions  Extremities: no clubbing, cyanosis, or edema  Neurologic: attentive , poorly interactive  Peg in place  LAB RESULTS:                        8.3    7.89  )-----------( 313      ( 2021 06:25 )             26.1         135  |  96  |  30<H>  ----------------------------<  97  4.6   |  27  |  1.06    Ca    9.0      2021 06:25        Urinalysis Basic - ( 2021 12:37 )    Color: Light Yellow / Appearance: Clear / S.020 / pH: x  Gluc: x / Ketone: Negative  / Bili: Negative / Urobili: Negative   Blood: x / Protein: 30 mg/dL / Nitrite: Negative   Leuk Esterase: Negative / RBC: 5 /hpf / WBC 3 /HPF   Sq Epi: x / Non Sq Epi: 1 /hpf / Bacteria: Negative      MICROBIOLOGY:  RECENT CULTURES:   @ 22:23 .Sputum Sputum     Moderate Pseudomonas aeruginosa    Numerous polymorphonuclear leukocytes per low power field  Few Squamous epithelial cells per low power field  Numerous Gram Positive Cocci in Pairs and Chains per oil power field  Moderate Gram Negative Rods per oil power field  Few Gram Positive Rods per oil power field     @ 18:31 Catheterized Catheterized     No growth       @ 14:32 .Blood Blood-Peripheral     No growth to date.       @ 13:25 .Blood Blood     No growth to date.          RADIOLOGY REVIEWED:    < from: Xray Chest 1 View- PORTABLE-Urgent (Xray Chest 1 View- PORTABLE-Urgent .) (21 @ 09:06) >      IMPRESSION: Clear lungs.    < end of copied text >  < from: CT Head No Cont (21 @ 19:25) >    EXAM:  CT BRAIN                            PROCEDURE DATE:  2021            INTERPRETATION:  CLINICAL INFORMATION:   Intracranial hemorrhage, follow-up ventricle size    TECHNIQUE:  Serial axial images were obtained from the skull base to thevertex without intravenous contrast. Coronal and sagittal reformatted images were obtained.    COMPARISON: CT head 2021    FINDINGS:  Status post suboccipital craniectomy with edema and hemorrhage focused in the cerebellar vermis, left greater than right, causing narrowing of the fourth ventricle. 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    Right parietal ventricular catheter in similar position in the mid right ventricle. The size of the ventricles is similar to prior. Stable punctate nonspecific hyperdensity in the region of the anterior third ventricle.    Scratched. Partial opacification of the bilateral mastoid air cells.    IMPRESSION:    *  Similar size andconfiguration of the ventricles. Similar position of the right ventricular catheter.    *  Similar edema and hemorrhage in the posterior fossa.    < from: VA Duplex Lower Ext Vein Scan, Bilat (11.24.21 @ 12:34) >  FINDINGS:    RIGHT:  Normal compressibility of the RIGHTcommon femoral, femoral and popliteal veins.  Doppler examination shows normal spontaneous and phasic flow.    Right posterior tibial and peroneal veins are patent and free of thrombus.    There is persistent right soleal vein DVT without proximal propagation.    LEFT:  Normal compressibility of the LEFT common femoral, femoral and popliteal veins.  Doppler examination shows normal spontaneous and phasic flow.    There is persistent left posterior tibial, peroneal, gastrocnemius and soleal vein DVT without proximal propagation.    IMPRESSION: There has been no change in the appearance of the bilateral below the knee DVT.    There is persistent right soleal vein DVT and persistent left posterior tibial, peroneal, gastrocnemius and soleal vein DVT without proximal propagation.    --- End of Report ---    < end of copied text >  < end of copied text >

## 2021-11-30 NOTE — SPEECH LANGUAGE PATHOLOGY EVALUATION - SLP PROGNOSIS
Pt with no observed vocalizatin or verbalization, no communicative attempts through out session. Pt with no oral reflexive movement to presentation of stimuli. Pt required frequent stimulation to maintain arousal throughout evaluation, sometimes noted to alert spontaneously, but also noted to not fully rouse with cues at other times.

## 2021-11-30 NOTE — PROGRESS NOTE ADULT - ASSESSMENT
61 year old man with respiratory failure d/t ICH    1. ICH  -per neurosurgery, s/p EVD  -for  shunt    2. Respiratory failure  -for tracheostomy, will require long term enteral nutrition  -s/p PEG, tolerating feeds  - aspiration precautions     3. Enterobacter PNA  -s/p course of antibiotics     4. afib with RVR    5. Anemia, no overt GI bleed. EGD unremarkable.  -trend [facility]  Gastroenterology and Hepatology  [address] [place], [place]  Office: [phone]  Cell: [phone]

## 2021-11-30 NOTE — SPEECH LANGUAGE PATHOLOGY EVALUATION - H & P REVIEW
61M hx HTN, DM, cardiac stent on ASA. At work complaining of HA ~8:30, starting feeling dizzy and vomiting, HTN/keke when EMS got to him. SBP 220s, HR 50s. On EMS arrival at 09:39AM 11/4/21, patient seen talking a little, garbling, moving all extremities, then quickly became unresponsive. No known blood thinners. CTH shows large posterior fossa bleed w/ IVH/SAH/hydro. Exam: Pre/intubation exam: no eyes open, pupils 2b/l, + corneals/cough/gag, not FC, LUE spont fingers moving, flacid otherwise./yes

## 2021-11-30 NOTE — PROGRESS NOTE ADULT - PROBLEM SELECTOR PLAN 4
sputum culture grew enterobacter and most recently pseudomonas.  - s/p 7 day course of cefepime on 11/21, now restarted as above

## 2021-11-30 NOTE — PROGRESS NOTE ADULT - ASSESSMENT
62 yo male with HTN admitted 11/4 with severe headache and found to have cerebella bleed.  He is s/p posterior fossa decompression on 11/4 followed by craniectomy and PICA aneurysm resection on 11/11 and placement of VPS.  He is s/p trach and peg.He has distal DVT's and is off anticoagulation.  He received cefepime 11/4-11/21 for respiratory coverage.  He developed watery diarrhea on 11/26 and was diagnosed with C diff.  He also has ongoing low grade fever and was restarted on cefepime on 11/28 for concerns of pneumonia.  His CXR's remain clear.He still has low grade temps.  Ct of brain shows residual blood.  Awaiting sensitivities of pseudomonas in sputum.  ISC for retention  11/28 urine and blood cultures are negative at 48 hours.  Suggest:  1.Continue enteral vanco  2.day 3 cefepime, await sensitivities, will consider switch to inhaled Giorgio in next 24 hours  3.With normal wbc I am not convinced low grade fever represents uncontrolled infection.  4.Supportive care, await neuro recovery

## 2021-11-30 NOTE — PROGRESS NOTE ADULT - SUBJECTIVE AND OBJECTIVE BOX
SUBJECTIVE: Patient seen and examined.  Patient is more awake per the nurse.  Followed once yesterday but otherwise just tracks around room.      Vital Signs Last 24 Hrs  T(C): 37.2 (30 Nov 2021 08:57), Max: 38.1 (29 Nov 2021 23:56)  T(F): 99 (30 Nov 2021 08:57), Max: 100.6 (29 Nov 2021 23:56)  HR: 84 (30 Nov 2021 08:57) (83 - 98)  BP: 120/71 (30 Nov 2021 08:57) (118/72 - 135/77)  BP(mean): --  RR: 18 (30 Nov 2021 08:57) (18 - 20)  SpO2: 99% (30 Nov 2021 08:57) (95% - 99%)    PHYSICAL EXAM:    Constitutional: No Acute Distress     Neurological: trach collar with alot of secretions opens eyes and tracks.  pupils 2 and non reactive, uppers withdraws lowers tf, did not follow commands for me     Pulmonary: Clear to Auscultation, No rales, No rhonchi, No wheezes     Cardiovascular: S1, S2, Regular rate and rhythm     Gastrointestinal: Soft, Non-tender, Non-distended     Extremities: No calf tenderness     Incision: c/d/i  LABS:                        8.3    7.89  )-----------( 313      ( 30 Nov 2021 06:25 )             26.1    11-30    135  |  96  |  30<H>  ----------------------------<  97  4.6   |  27  |  1.06    Ca    9.0      30 Nov 2021 06:25    PT/INR - ( 30 Nov 2021 06:25 )   PT: 13.5 sec;   INR: 1.13 ratio         PTT - ( 30 Nov 2021 06:25 )  PTT:29.9 sec    11-29 @ 07:01  -  11-30 @ 07:00  --------------------------------------------------------  IN: 1380 mL / OUT: 3200 mL / NET: -1820 mL    MEDICATIONS:  Anticoagulation:   heparin   Injectable 5000 Unit(s) SubCutaneous every 8 hours    Antibiotics:  cefepime   IVPB 2000 milliGRAM(s) IV Intermittent every 8 hours  vancomycin    Solution 125 milliGRAM(s) Oral every 6 hours    Endo:  atorvastatin 40 milliGRAM(s) Oral at bedtime  dextrose 50% Injectable 25 Gram(s) IV Push once  dextrose 50% Injectable 12.5 Gram(s) IV Push once  insulin lispro (ADMELOG) corrective regimen sliding scale   SubCutaneous every 6 hours  insulin NPH human recombinant 10 Unit(s) SubCutaneous every 6 hours    Neuro:  acetaminophen    Suspension .. 650 milliGRAM(s) Oral every 6 hours PRN Temp greater or equal to 38C (100.4F), Mild Pain (1 - 3)    Cardiac:  labetalol 100 milliGRAM(s) Oral two times a day    Pulm:  acetylcysteine 10%  Inhalation 3 milliLiter(s) Inhalation three times a day  sodium chloride 3%  Inhalation 3 milliLiter(s) Inhalation every 6 hours    GI/:    Other:   artificial  tears Solution 1 Drop(s) Both EYES every 6 hours  chlorhexidine 0.12% Liquid 15 milliLiter(s) Oral Mucosa two times a day  chlorhexidine 4% Liquid 1 Application(s) Topical <User Schedule>  nystatin Powder 1 Application(s) Topical two times a day    DIET: glucerna via peg     IMAGING:

## 2021-11-30 NOTE — SPEECH LANGUAGE PATHOLOGY EVALUATION - COMMENTS
Per Neuro: CTA with hyperdense foci in 4th vent suggestive of aneurysm vs. active hemorrhage, possibly HTNive hemorrhage given location and presented with SBP>200s.  11/4: Admitted. intubated for GCS/Airway protection. EVD placed in ED, s/p SOC  11/5: s/p diagnostic angio L PICA aneurysm, untreated  11/9: worsening exam, started on HTS, - EVD@10 with minimal output Per Neuro: CTA with hyperdense foci in 4th vent suggestive of aneurysm vs. active hemorrhage, possibly HTNive hemorrhage given location and presented with SBP>200s.  11/4: Admitted. intubated for GCS/Airway protection. 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

## 2021-11-30 NOTE — PROGRESS NOTE ADULT - PROBLEM SELECTOR PLAN 5
fungal-appearing rash in b/l groin with right side extending to lower abd and lateral hip  - keep area dry  - continue nystatin powder

## 2021-12-01 LAB
-  CLINDAMYCIN: SIGNIFICANT CHANGE UP
-  ERYTHROMYCIN: SIGNIFICANT CHANGE UP
-  LEVOFLOXACIN: SIGNIFICANT CHANGE UP
-  TRIMETHOPRIM/SULFAMETHOXAZOLE: SIGNIFICANT CHANGE UP
-  VANCOMYCIN: SIGNIFICANT CHANGE UP
CULTURE RESULTS: SIGNIFICANT CHANGE UP
METHOD TYPE: SIGNIFICANT CHANGE UP
ORGANISM # SPEC MICROSCOPIC CNT: SIGNIFICANT CHANGE UP
SPECIMEN SOURCE: SIGNIFICANT CHANGE UP

## 2021-12-01 PROCEDURE — 99232 SBSQ HOSP IP/OBS MODERATE 35: CPT

## 2021-12-01 RX ORDER — TOBRAMYCIN SULFATE 40 MG/ML
300 VIAL (ML) INJECTION EVERY 12 HOURS
Refills: 0 | Status: DISCONTINUED | OUTPATIENT
Start: 2021-12-01 | End: 2021-12-06

## 2021-12-01 RX ADMIN — CHLORHEXIDINE GLUCONATE 15 MILLILITER(S): 213 SOLUTION TOPICAL at 18:01

## 2021-12-01 RX ADMIN — Medication 2: at 17:48

## 2021-12-01 RX ADMIN — Medication 125 MILLIGRAM(S): at 17:59

## 2021-12-01 RX ADMIN — CHLORHEXIDINE GLUCONATE 15 MILLILITER(S): 213 SOLUTION TOPICAL at 06:50

## 2021-12-01 RX ADMIN — CEFEPIME 100 MILLIGRAM(S): 1 INJECTION, POWDER, FOR SOLUTION INTRAMUSCULAR; INTRAVENOUS at 05:57

## 2021-12-01 RX ADMIN — Medication 125 MILLIGRAM(S): at 05:57

## 2021-12-01 RX ADMIN — NYSTATIN CREAM 1 APPLICATION(S): 100000 CREAM TOPICAL at 06:50

## 2021-12-01 RX ADMIN — Medication 2: at 12:31

## 2021-12-01 RX ADMIN — Medication 1 DROP(S): at 17:47

## 2021-12-01 RX ADMIN — HUMAN INSULIN 10 UNIT(S): 100 INJECTION, SUSPENSION SUBCUTANEOUS at 12:32

## 2021-12-01 RX ADMIN — HUMAN INSULIN 10 UNIT(S): 100 INJECTION, SUSPENSION SUBCUTANEOUS at 06:51

## 2021-12-01 RX ADMIN — Medication 100 MILLIGRAM(S): at 17:59

## 2021-12-01 RX ADMIN — CEFEPIME 100 MILLIGRAM(S): 1 INJECTION, POWDER, FOR SOLUTION INTRAMUSCULAR; INTRAVENOUS at 15:01

## 2021-12-01 RX ADMIN — HEPARIN SODIUM 5000 UNIT(S): 5000 INJECTION INTRAVENOUS; SUBCUTANEOUS at 05:57

## 2021-12-01 RX ADMIN — Medication 300 MILLIGRAM(S): at 18:52

## 2021-12-01 RX ADMIN — SODIUM CHLORIDE 3 MILLILITER(S): 9 INJECTION INTRAMUSCULAR; INTRAVENOUS; SUBCUTANEOUS at 08:00

## 2021-12-01 RX ADMIN — Medication 125 MILLIGRAM(S): at 12:53

## 2021-12-01 RX ADMIN — SODIUM CHLORIDE 3 MILLILITER(S): 9 INJECTION INTRAMUSCULAR; INTRAVENOUS; SUBCUTANEOUS at 12:49

## 2021-12-01 RX ADMIN — SODIUM CHLORIDE 3 MILLILITER(S): 9 INJECTION INTRAMUSCULAR; INTRAVENOUS; SUBCUTANEOUS at 06:50

## 2021-12-01 RX ADMIN — NYSTATIN CREAM 1 APPLICATION(S): 100000 CREAM TOPICAL at 18:00

## 2021-12-01 RX ADMIN — HUMAN INSULIN 10 UNIT(S): 100 INJECTION, SUSPENSION SUBCUTANEOUS at 17:45

## 2021-12-01 RX ADMIN — Medication 100 MILLIGRAM(S): at 06:50

## 2021-12-01 RX ADMIN — Medication 2: at 06:51

## 2021-12-01 RX ADMIN — Medication 1 DROP(S): at 06:51

## 2021-12-01 RX ADMIN — HEPARIN SODIUM 5000 UNIT(S): 5000 INJECTION INTRAVENOUS; SUBCUTANEOUS at 14:00

## 2021-12-01 RX ADMIN — Medication 1 DROP(S): at 12:19

## 2021-12-01 NOTE — PROGRESS NOTE ADULT - ATTENDING COMMENTS
Plan for repeat duplex  for surveillance  Continue DVT ppx      [facility] [phone]

## 2021-12-01 NOTE — PROGRESS NOTE ADULT - ASSESSMENT
61 year old man with respiratory failure d/t ICH    1. ICH  -per neurosurgery, s/p EVD  -for  shunt    2. Respiratory failure  -for tracheostomy, will require long term enteral nutrition  -s/p PEG, tolerating feeds  - aspiration precautions     3. Enterobacter PNA  -s/p course of antibiotics     4. afib with RVR    5. Anemia, no overt GI bleed. EGD unremarkable.  -trend CBC    6. Cdiff infection on PO vanco  -no BM documented today    [facility] [place]  Gastroenterology and Hepatology  [address] [place], [place]  Office: [phone]  Cell: [phone]

## 2021-12-01 NOTE — PROGRESS NOTE ADULT - SUBJECTIVE AND OBJECTIVE BOX
Chief Complaint:  Patient is a 61y old  Male who presents with a chief complaint of AMS (17 Nov 2021 13:07)          Interval Events:   no BM today    Hospital Medications:        Review of Systems:  General:  No wt loss, fevers, chills, night sweats, fatigue,   Eyes:  Good vision, no reported pain  ENT:  No sore throat, pain, runny nose, dysphagia  CV:  No pain, palpitations, hypo/hypertension  Resp:  No dyspnea, cough, tachypnea, wheezing  GI:  See HPI  :  No pain, bleeding, incontinence, nocturia  Muscle:  No pain, weakness  Neuro:  No weakness, tingling, memory problems  Psych:  No fatigue, insomnia, mood problems, depression  Endocrine:  No polyuria, polydipsia, cold/heat intolerance  Heme:  No petechiae, ecchymosis, easy bruisability  Integumentary:  No rash, edema    PHYSICAL EXAM:   Vital Signs Last 24 Hrs  Vital Signs Last 24 Hrs  T(C): 37.4 (29 Nov 2021 11:20), Max: 38.5 (29 Nov 2021 01:08)  T(F): 99.3 (29 Nov 2021 11:20), Max: 101.3 (29 Nov 2021 01:08)  HR: 83 (29 Nov 2021 11:20) (77 - 95)  BP: 125/73 (29 Nov 2021 11:20) (107/66 - 139/77)  BP(mean): --  RR: 20 (29 Nov 2021 11:20) (18 - 20)  SpO2: 96% (29 Nov 2021 11:20) (96% - 99%)  PHYSICAL EXAM:     GENERAL:  Appears stated age, well-groomed, well-nourished, no distress  HEENT:  NC/AT,  conjunctivae anicteric, clear and pink,   NECK: supple, trachea midline  CHEST:  Full & symmetric excursion, no increased effort, breath sounds clear  HEART:  Regular rhythm, no JVD  ABDOMEN:  Soft, non-tender, non-distended, normoactive bowel sounds,  no masses , no hepatosplenomegaly  EXTREMITIES:  no cyanosis,clubbing or edema  SKIN:  No rash, erythema, or, ecchymoses, no jaundice  NEURO:  Alert, non-focal, no asterixis  PSYCH: Appropriate affect, oriented to place and time  RECTAL: Deferred      LABS Personally reviewed by me:                        7.6    9.57  )-----------( 432      ( 22 Nov 2021 20:44 )             24.2     Mean Cell Volume: 96.4 fl (11-22-21 @ 20:44)    11-22    139  |  103  |  31<H>  ----------------------------<  186<H>  4.5   |  27  |  1.06    Ca    8.2<L>      22 Nov 2021 20:44  Phos  2.3     11-22  Mg     2.2     11-22        PT/INR - ( 22 Nov 2021 18:00 )   PT: 13.3 sec;   INR: 1.11 ratio         PTT - ( 22 Nov 2021 18:00 )  PTT:30.8 sec                            7.6    9.57  )-----------( 432      ( 22 Nov 2021 20:44 )             24.2                         7.3    10.18 )-----------( 388      ( 21 Nov 2021 22:18 )             22.9                         7.8    10.72 )-----------( 362      ( 20 Nov 2021 21:46 )             24.1       Imaging personally reviewed by me:

## 2021-12-01 NOTE — PROGRESS NOTE ADULT - SUBJECTIVE AND OBJECTIVE BOX
SUBJECTIVE: Pt seen and examined, resting in bed, appears comfortable, opened eyes to voice, afebrile overnight    OVERNIGHT EVENTS: none    Vital Signs Last 24 Hrs  T(C): 37.3 (01 Dec 2021 06:00), Max: 37.3 (01 Dec 2021 06:00)  T(F): 99.1 (01 Dec 2021 06:00), Max: 99.1 (01 Dec 2021 06:00)  HR: 90 (01 Dec 2021 06:00) (85 - 90)  BP: 136/76 (01 Dec 2021 06:00) (116/70 - 136/76)  BP(mean): --  RR: 16 (01 Dec 2021 06:00) (16 - 18)  SpO2: 96% (01 Dec 2021 06:00) (96% - 98%)    PHYSICAL EXAM:    General: No Acute Distress     Neurological: opened eyes to name, dysconjugate gaze, pupils NR, squeezed left hand, wiggled right toes, has trach collar with moderate secretions    Pulmonary: Clear to Auscultation, No Rales, No Rhonchi, No Wheezes     Cardiovascular: S1, S2, Regular Rate and Rhythm     Gastrointestinal: Soft, Nontender, Nondistended, + PEG    Incision: cranial staples c/d/i, abd steri strips c//d/i, right groin site c/d/i      LABS:                        8.3    7.89  )-----------( 313      ( 30 Nov 2021 06:25 )             26.1    11-30    135  |  96  |  30<H>  ----------------------------<  97  4.6   |  27  |  1.06    Ca    9.0      30 Nov 2021 06:25    PT/INR - ( 30 Nov 2021 06:25 )   PT: 13.5 sec;   INR: 1.13 ratio         PTT - ( 30 Nov 2021 06:25 )  PTT:29.9 sec      11-30 @ 07:01  -  12-01 @ 07:00  --------------------------------------------------------  IN: 1380 mL / OUT: 2050 mL / NET: -670 mL      DRAINS: none    MEDICATIONS:  Antibiotics:  cefepime   IVPB 2000 milliGRAM(s) IV Intermittent every 8 hours  vancomycin    Solution 125 milliGRAM(s) Oral every 6 hours    Neuro:  acetaminophen    Suspension .. 650 milliGRAM(s) Oral every 6 hours PRN Temp greater or equal to 38C (100.4F), Mild Pain (1 - 3)    Cardiac:  labetalol 100 milliGRAM(s) Oral two times a day    Pulm:  sodium chloride 3%  Inhalation 3 milliLiter(s) Inhalation every 6 hours    GI/:    Other:   artificial  tears Solution 1 Drop(s) Both EYES every 6 hours  atorvastatin 40 milliGRAM(s) Oral at bedtime  chlorhexidine 0.12% Liquid 15 milliLiter(s) Oral Mucosa two times a day  chlorhexidine 4% Liquid 1 Application(s) Topical <User Schedule>  dextrose 50% Injectable 25 Gram(s) IV Push once  dextrose 50% Injectable 12.5 Gram(s) IV Push once  heparin   Injectable 5000 Unit(s) SubCutaneous every 8 hours  insulin lispro (ADMELOG) corrective regimen sliding scale   SubCutaneous every 6 hours  insulin NPH human recombinant 10 Unit(s) SubCutaneous every 6 hours  nystatin Powder 1 Application(s) Topical two times a day    DIET: [] Regular [] CCD [] Renal [] Puree [] Dysphagia [x] Tube Feeds:     IMAGING:   < from: CT Head No Cont (11.26.21 @ 19:25) >  IMPRESSION:    *  Similar size andconfiguration of the ventricles. Similar position of the right ventricular catheter.    *  Similar edema and hemorrhage in the posterior fossa.    < end of copied text >  < from: VA Duplex Lower Ext Vein Scan, Bilat (11.24.21 @ 12:34) >  IMPRESSION: There has been no change in the appearance of the bilateral below the knee DVT.    There is persistent right soleal vein DVT and persistent left posterior tibial, peroneal, gastrocnemius and soleal vein DVT without proximal propagation.    < end of copied text >  < from: Xray Chest 1 View- PORTABLE-Urgent (Xray Chest 1 View- PORTABLE-Urgent .) (11.28.21 @ 09:06) >    IMPRESSION: Clear lungs.    < end of copied text >

## 2021-12-01 NOTE — PROGRESS NOTE ADULT - PROBLEM SELECTOR PLAN 3
Cefepime restarted as above. Fever resolved. Possible transition to inhaled Tobramycin per ID. Cefepime restarted as above. Continues to have intermittent fever. Monitor.

## 2021-12-01 NOTE — PROGRESS NOTE ADULT - SUBJECTIVE AND OBJECTIVE BOX
CC: f/u for fever, cdif, tracheobronchitis    Patient reports  nothing  REVIEW OF SYSTEMS:  All other review of systems negative (Comprehensive ROS)cannot get    Antimicrobials Day #  :  cefepime   IVPB 2000 milliGRAM(s) IV Intermittent every 8 hours  day 4  vancomycin    Solution 125 milliGRAM(s) Oral every 6 hours  day 6    Other Medications Reviewed    T(F): 98.2 (12-01-21 @ 15:00), Max: 101.3 (12-01-21 @ 10:00)  HR: 85 (12-01-21 @ 15:00)  BP: 116/72 (12-01-21 @ 15:00)  RR: 18 (12-01-21 @ 15:00)  SpO2: 96% (12-01-21 @ 15:00)  Wt(kg): --    PHYSICAL EXAM:  General: unresponsive,  copious secretions  Eyes:  anicteric, no conjunctival injection, no discharge  Oropharynx: no lesions or injection 	  Neck: supple, without adenopathy, trach  Lungs: clear to auscultation  Heart: regular rate and rhythm; no murmur, rubs or gallops  Abdomen: soft, nondistended, nontender, without mass or organomegaly, peg  Skin: no lesions  Extremities: no clubbing, cyanosis, or edema  Neurologic: not responsive coughs only  head wounds intact  LAB RESULTS:                        8.3    7.89  )-----------( 313      ( 30 Nov 2021 06:25 )             26.1     11-30    135  |  96  |  30<H>  ----------------------------<  97  4.6   |  27  |  1.06    Ca    9.0      30 Nov 2021 06:25          MICROBIOLOGY:  RECENT CULTURES:  11-28 @ 22:23 .Sputum Sputum Pseudomonas aeruginosa (Carbapenem Resistant)    Moderate Pseudomonas aeruginosa    Numerous polymorphonuclear leukocytes per low power field  Few Squamous epithelial cells per low power field  Numerous Gram Positive Cocci in Pairs and Chains per oil power field  Moderate Gram Negative Rods per oil power field  Few Gram Positive Rods per oil power field    11-28 @ 18:31 Catheterized Catheterized     No growth      11-28 @ 14:32 .Blood Blood-Peripheral     No growth to date.          RADIOLOGY REVIEWED:  < from: CT Head No Cont (11.26.21 @ 19:25) >  EXAM:  CT BRAIN                            PROCEDURE DATE:  11/26/2021            INTERPRETATION:  CLINICAL INFORMATION:   Intracranial hemorrhage, follow-up ventricle size    TECHNIQUE:  Serial axial images were obtained from the skull base to thevertex without intravenous contrast. Coronal and sagittal reformatted images were obtained.    COMPARISON: CT head 11/24/2021    FINDINGS:  Status post suboccipital craniectomy with edema and hemorrhage focused in the cerebellar vermis, left greater than right, causing narrowing of the fourth ventricle. 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    Right parietal ventricular catheter in similar position in the mid right ventricle. The size of the ventricles is similar to prior. Stable punctate nonspecific hyperdensity in the region of the anterior third ventricle.    Scratched. Partial opacification of the bilateral mastoid air cells.    IMPRESSION:    *  Similar size andconfiguration of the ventricles. Similar position of the right ventricular catheter.    *  Similar edema and hemorrhage in the posterior fossa.    --- End of Report ---        < end of copied text >              Assessment:  patient admitted a month ago with cerebellar bleed, s/p craniotomy/craniectomy/ aneurysm resection, peg, trach, was treated for pneumonia earlier in stay, diagnosed with cdif 5 days ago, diarrhea persists despite po vanco. He has excessive trach secretions despite cefepime for presumed tracheobronchitis with pseudomonas sensitive to cefepime. He continues to have fever which may actually be just related to blood in the head. C dif also not much better, likely due to the systemic antibiotics so will stop at this point.  Has distal dvt too but likely not source of temps  Plan:  continue enteral vanco  will substitute ulises nebs for cefepime for tracheobronchitis  rcu consult for more aggressive pulmonary toilet  f/u latest cultures  monitor secretions and stool output  will ct cap if fevers persist

## 2021-12-01 NOTE — PROGRESS NOTE ADULT - SUBJECTIVE AND OBJECTIVE BOX
Vascular Cardiology  Progress note  EMAIL [email or web address]   OFFICE [phone]    INTERVAL HISTORY:  -No acute events overnight. Patient remains with trach- has been having copious secretions suctioned off. Opens eyes spontaneously.     Allergies  penicillin    MEDICATIONS:  heparin   Injectable 5000 Unit(s) SubCutaneous every 8 hours  labetalol 100 milliGRAM(s) Oral two times a day  cefepime   IVPB 2000 milliGRAM(s) IV Intermittent every 8 hours  vancomycin    Solution 125 milliGRAM(s) Oral every 6 hours  sodium chloride 3%  Inhalation 3 milliLiter(s) Inhalation every 6 hours  acetaminophen    Suspension .. 650 milliGRAM(s) Oral every 6 hours PRN  atorvastatin 40 milliGRAM(s) Oral at bedtime  dextrose 50% Injectable 25 Gram(s) IV Push once  dextrose 50% Injectable 12.5 Gram(s) IV Push once  insulin lispro (ADMELOG) corrective regimen sliding scale   SubCutaneous every 6 hours  insulin NPH human recombinant 10 Unit(s) SubCutaneous every 6 hours  artificial  tears Solution 1 Drop(s) Both EYES every 6 hours  chlorhexidine 0.12% Liquid 15 milliLiter(s) Oral Mucosa two times a day  chlorhexidine 4% Liquid 1 Application(s) Topical <User Schedule>  nystatin Powder 1 Application(s) Topical two times a day    PAST MEDICAL & SURGICAL HISTORY:  HTN (hypertension)  Diabetes mellitus    SOCIAL HISTORY:  unchanged    REVIEW OF SYSTEMS:  -Limited 2/2 to patient factors.     [x] Unable to obtain    PHYSICAL EXAM:  T(C): 38.5 (12-01-21 @ 10:00), Max: 38.5 (12-01-21 @ 10:00)  HR: 86 (12-01-21 @ 08:29) (85 - 90)  BP: 125/74 (12-01-21 @ 08:29) (116/70 - 136/76)  RR: 18 (12-01-21 @ 08:29) (16 - 18)  SpO2: 96% (12-01-21 @ 08:29) (96% - 98%)    Wt(kg): --  I&O's Summary    30 Nov 2021 07:01  -  01 Dec 2021 07:00  --------------------------------------------------------  IN: 1380 mL / OUT: 2050 mL / NET: -670 mL    Appearance: Frail appearing, nonverbal, with trach and PEG  HEENT:   Normal oral mucosa, PERRL, EOMI	  Lymphatic: No lymphadenopathy  Cardiovascular:  S1, S2, RRR, no RMG  Respiratory:  CTA b/l  Psychiatry:  Nonverbal; nods head upon command  Gastrointestinal:  Soft, Non-tender, + BS	  Skin: No rashes, No ecchymoses, No cyanosis	  Extremities:  B/l LE with no edema, asymmetry of girth, skin changes; extremities are warm with no ulcers or wounds, no phlegmasia    Vascular Pulse Exam:  Right DP: [x]palpable []non-palpable []audible      Left DP :   [x]palpable []non-palpable []audible  Right PT: [x]palpable [] non-palpable []audible   Left PT:  [x] palpable [] non-palpable []audible      LABS:	 	    CBC Full  -  ( 30 Nov 2021 06:25 )  WBC Count : 7.89 K/uL  Hemoglobin : 8.3 g/dL  Hematocrit : 26.1 %  Platelet Count - Automated : 313 K/uL  Mean Cell Volume : 94.2 fl  Mean Cell Hemoglobin : 30.0 pg  Mean Cell Hemoglobin Concentration : 31.8 gm/dL  Auto Neutrophil # : 5.46 K/uL  Auto Lymphocyte # : 1.45 K/uL  Auto Monocyte # : 0.61 K/uL  Auto Eosinophil # : 0.32 K/uL  Auto Basophil # : 0.02 K/uL  Auto Neutrophil % : 69.1 %  Auto Lymphocyte % : 18.4 %  Auto Monocyte % : 7.7 %  Auto Eosinophil % : 4.1 %  Auto Basophil % : 0.3 %    11-30    135  |  96  |  30<H>  ----------------------------<  97  4.6   |  27  |  1.06  11-29    135  |  96  |  29<H>  ----------------------------<  184<H>  4.6   |  28  |  0.96    Ca    9.0      30 Nov 2021 06:25  Ca    8.6      29 Nov 2021 11:26    Assessment:  1. 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 EVD clamped & removed.  2. C. difficile diarrhea  --PCR positive. likely 2/2 to Abx use  3. Gram-negative pneumonia.   --s/p 7 day course of cefepime on 11/21; was started on levofloxacin for 11/24 sputum cx with pseudomonas  4. Respiratory failure.   --s/p trach. on trach collar  5. B/l below knee DVTs  --Right soleal vein DVT and persistent left posterior tibial, peroneal, gastrocnemius and soleal vein DVT without proximal propagation on last duplex on 11/24  --On DVT PPx only  8. CAD   --s/p stent and on DAPT  --ASA to be resumed when cleared by NSG; on statin    Plan:  1. Continue DVT PPx, as permitted by NSG.   2. Continue serial b/l LE VA duplexes for DVT propagation surveillance; please order next one for today.    Thank you      Vascular Cardiology Service    Please call with any questions:   Service Line: [phone]  Office [phone]  email:  [email or web address]

## 2021-12-01 NOTE — PROGRESS NOTE ADULT - SUBJECTIVE AND OBJECTIVE BOX
Patient is a 61y old  Male who presents with a chief complaint of ICH (01 Dec 2021 09:20)      SUBJECTIVE / OVERNIGHT EVENTS: Remains afebrile, not arousable. Tachypnea improved.     MEDICATIONS  (STANDING):  artificial  tears Solution 1 Drop(s) Both EYES every 6 hours  atorvastatin 40 milliGRAM(s) Oral at bedtime  cefepime   IVPB 2000 milliGRAM(s) IV Intermittent every 8 hours  chlorhexidine 0.12% Liquid 15 milliLiter(s) Oral Mucosa two times a day  chlorhexidine 4% Liquid 1 Application(s) Topical <User Schedule>  dextrose 50% Injectable 25 Gram(s) IV Push once  dextrose 50% Injectable 12.5 Gram(s) IV Push once  heparin   Injectable 5000 Unit(s) SubCutaneous every 8 hours  insulin lispro (ADMELOG) corrective regimen sliding scale   SubCutaneous every 6 hours  insulin NPH human recombinant 10 Unit(s) SubCutaneous every 6 hours  labetalol 100 milliGRAM(s) Oral two times a day  nystatin Powder 1 Application(s) Topical two times a day  sodium chloride 3%  Inhalation 3 milliLiter(s) Inhalation every 6 hours  vancomycin    Solution 125 milliGRAM(s) Oral every 6 hours    MEDICATIONS  (PRN):  acetaminophen    Suspension .. 650 milliGRAM(s) Oral every 6 hours PRN Temp greater or equal to 38C (100.4F), Mild Pain (1 - 3)      CAPILLARY BLOOD GLUCOSE      POCT Blood Glucose.: 161 mg/dL (01 Dec 2021 12:24)  POCT Blood Glucose.: 170 mg/dL (01 Dec 2021 06:25)  POCT Blood Glucose.: 178 mg/dL (30 Nov 2021 23:35)  POCT Blood Glucose.: 203 mg/dL (30 Nov 2021 18:25)    I&O's Summary    30 Nov 2021 07:01  -  01 Dec 2021 07:00  --------------------------------------------------------  IN: 1380 mL / OUT: 2050 mL / NET: -670 mL        PHYSICAL EXAM:  T(C): 37.7 (12-01-21 @ 11:43), Max: 38.5 (12-01-21 @ 10:00)  HR: 88 (12-01-21 @ 11:43) (85 - 90)  BP: 120/74 (12-01-21 @ 10:30) (116/70 - 136/76)  RR: 18 (12-01-21 @ 11:43) (16 - 18)  SpO2: 96% (12-01-21 @ 11:43) (96% - 98%)  CONSTITUTIONAL: Non-verbal, not arousable  EYES: PERRLA; conjunctiva and sclera clear  ENMT: Moist oral mucosa, no pharyngeal injection or exudates;   NECK: Supple, no palpable masses; no thyromegaly; Trach  RESPIRATORY: Tachypneic; coarse breath sounds bilaterally  CARDIOVASCULAR: Regular rate and rhythm, normal S1 and S2, no murmur/rub/gallop; No lower extremity edema; Peripheral pulses are 2+ bilaterally  ABDOMEN: Nontender to palpation, normoactive bowel sounds, no rebound/guarding; No hepatosplenomegaly  MUSCULOSKELETAL:  No clubbing or cyanosis of digits; no joint swelling or tenderness to palpation  PSYCH: Non-verbal  NEUROLOGY: Unable to assess  SKIN: Groin rash    LABS:                        8.3    7.89  )-----------( 313      ( 30 Nov 2021 06:25 )             26.1     11-30    135  |  96  |  30<H>  ----------------------------<  97  4.6   |  27  |  1.06    Ca    9.0      30 Nov 2021 06:25      PT/INR - ( 30 Nov 2021 06:25 )   PT: 13.5 sec;   INR: 1.13 ratio         PTT - ( 30 Nov 2021 06:25 )  PTT:29.9 sec          RADIOLOGY & ADDITIONAL TESTS:    Imaging Personally Reviewed:    Consultant(s) Notes Reviewed:      Care Discussed with Consultants/Other Providers: Carolyn   Patient is a 61y old  Male who presents with a chief complaint of ICH (01 Dec 2021 09:20)      SUBJECTIVE / OVERNIGHT EVENTS: Febrile again today, not arousable. Tachypnea improved.     MEDICATIONS  (STANDING):  artificial  tears Solution 1 Drop(s) Both EYES every 6 hours  atorvastatin 40 milliGRAM(s) Oral at bedtime  cefepime   IVPB 2000 milliGRAM(s) IV Intermittent every 8 hours  chlorhexidine 0.12% Liquid 15 milliLiter(s) Oral Mucosa two times a day  chlorhexidine 4% Liquid 1 Application(s) Topical <User Schedule>  dextrose 50% Injectable 25 Gram(s) IV Push once  dextrose 50% Injectable 12.5 Gram(s) IV Push once  heparin   Injectable 5000 Unit(s) SubCutaneous every 8 hours  insulin lispro (ADMELOG) corrective regimen sliding scale   SubCutaneous every 6 hours  insulin NPH human recombinant 10 Unit(s) SubCutaneous every 6 hours  labetalol 100 milliGRAM(s) Oral two times a day  nystatin Powder 1 Application(s) Topical two times a day  sodium chloride 3%  Inhalation 3 milliLiter(s) Inhalation every 6 hours  vancomycin    Solution 125 milliGRAM(s) Oral every 6 hours    MEDICATIONS  (PRN):  acetaminophen    Suspension .. 650 milliGRAM(s) Oral every 6 hours PRN Temp greater or equal to 38C (100.4F), Mild Pain (1 - 3)      CAPILLARY BLOOD GLUCOSE      POCT Blood Glucose.: 161 mg/dL (01 Dec 2021 12:24)  POCT Blood Glucose.: 170 mg/dL (01 Dec 2021 06:25)  POCT Blood Glucose.: 178 mg/dL (30 Nov 2021 23:35)  POCT Blood Glucose.: 203 mg/dL (30 Nov 2021 18:25)    I&O's Summary    30 Nov 2021 07:01  -  01 Dec 2021 07:00  --------------------------------------------------------  IN: 1380 mL / OUT: 2050 mL / NET: -670 mL        PHYSICAL EXAM:  T(C): 37.7 (12-01-21 @ 11:43), Max: 38.5 (12-01-21 @ 10:00)  HR: 88 (12-01-21 @ 11:43) (85 - 90)  BP: 120/74 (12-01-21 @ 10:30) (116/70 - 136/76)  RR: 18 (12-01-21 @ 11:43) (16 - 18)  SpO2: 96% (12-01-21 @ 11:43) (96% - 98%)  CONSTITUTIONAL: Non-verbal, not arousable  EYES: PERRLA; conjunctiva and sclera clear  ENMT: Moist oral mucosa, no pharyngeal injection or exudates;   NECK: Supple, no palpable masses; no thyromegaly; Trach  RESPIRATORY: Tachypneic; coarse breath sounds bilaterally  CARDIOVASCULAR: Regular rate and rhythm, normal S1 and S2, no murmur/rub/gallop; No lower extremity edema; Peripheral pulses are 2+ bilaterally  ABDOMEN: Nontender to palpation, normoactive bowel sounds, no rebound/guarding; No hepatosplenomegaly  MUSCULOSKELETAL:  No clubbing or cyanosis of digits; no joint swelling or tenderness to palpation  PSYCH: Non-verbal  NEUROLOGY: Unable to assess  SKIN: Groin rash    LABS:                        8.3    7.89  )-----------( 313      ( 30 Nov 2021 06:25 )             26.1     11-30    135  |  96  |  30<H>  ----------------------------<  97  4.6   |  27  |  1.06    Ca    9.0      30 Nov 2021 06:25      PT/INR - ( 30 Nov 2021 06:25 )   PT: 13.5 sec;   INR: 1.13 ratio         PTT - ( 30 Nov 2021 06:25 )  PTT:29.9 sec          RADIOLOGY & ADDITIONAL TESTS:    Imaging Personally Reviewed:    Consultant(s) Notes Reviewed:      Care Discussed with Consultants/Other Providers: Carolyn

## 2021-12-01 NOTE — PROGRESS NOTE ADULT - PROBLEM SELECTOR PLAN 12
DVT ppx: hep subc DVT ppx: hep subc    Overall prognosis guarded. Recommend continues GOC discussions w family/HCP. DVT ppx: hep subc    Overall prognosis guarded. Recommend continued GOC discussions w family/HCP.

## 2021-12-01 NOTE — PROGRESS NOTE ADULT - ASSESSMENT
61M hx HTN, DM, cardiac stent on ASA. At work complaining of HA ~8:30, starting feeling dizzy and vomiting, HTN/keke when EMS got to him. SBP 220s, HR 50s.     On EMS arrival at 09:39AM 11/4/21, patient seen talking a little, garbling, moving all extremities, then quickly became unresponsive. No known blood thinners.    CTH shows large posterior fossa bleed w/ IVH/SAH/hydro.    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, s/p crani for PICA aneurysm resection on 11/10/2021, s/p trach/PEG on 11/19/21, s/p insertion of right parietal ventriculoperitoneal shunt (Certas @ 4) on 11/23/2021      PLAN:  Neuro:   -q4 hour neuro checks  -tylenol for pain control and fevers, afebrile overnight  -out of bed with assistance  -pt/ot - subacute rehab upon discharge  -VPS Certas @4  Respiratory:   - on trach collar  - suction prn- moderate amount of secretions  - continue 3% sodium chloride inhalation  CV:  -keep sbp 100-140  -labetalol for bp control  -atorvastatin for lipid control  Endocrine:    -nph and JOSE MARTIN for glucose control  -keep fingersticks 100-180  DVT ppx:   - hep sq  - persistent bilateral below knee dvt  - ?plan to start full dose A/C  ID:   -afebrile   -po vanco for c. diff  -ID following  - Gram-negative pneumonia- sputum culture grew enterobacter and most recently pseudomonas.  - s/p 7 day course of cefepime on 11/21, now restarted as per ID  GI:    - tube feeds  - dc rectal tube  PT/OT:   - CHUYITA      [facility] # [id number]

## 2021-12-01 NOTE — PROGRESS NOTE ADULT - ASSESSMENT
60 yo male with PMH of HTN, CAD s/p stent on DAPT, T2DM who complained of headache and subsequently become unresponsive. 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 EVD clamped & removed.    NSCU course also c/b respiratory failure and dysphagia now s/p trach and PEG on 11/19. S/p VPS placement on 11/23. Completed course of cefepime for enterobacter and pseudomonas pneumonia on 11/21. Transferred out of NSCU on 11/25. Began to spike fevers with watery output from rectal tube found to be positive for c. diff, PO vanco started on 11/26.     Persistent fever, increased secretions, sputum colonized by Pseudomonas, Cefepime restarted on 11/28.     Currently in bed, difficult to arouse.

## 2021-12-02 PROCEDURE — 99233 SBSQ HOSP IP/OBS HIGH 50: CPT

## 2021-12-02 PROCEDURE — 99232 SBSQ HOSP IP/OBS MODERATE 35: CPT

## 2021-12-02 RX ORDER — APIXABAN 2.5 MG/1
5 TABLET, FILM COATED ORAL EVERY 12 HOURS
Refills: 0 | Status: DISCONTINUED | OUTPATIENT
Start: 2021-12-02 | End: 2021-12-07

## 2021-12-02 RX ORDER — CEFEPIME 1 G/1
2000 INJECTION, POWDER, FOR SOLUTION INTRAMUSCULAR; INTRAVENOUS THREE TIMES A DAY
Refills: 0 | Status: DISCONTINUED | OUTPATIENT
Start: 2021-12-02 | End: 2021-12-03

## 2021-12-02 RX ADMIN — Medication 125 MILLIGRAM(S): at 00:08

## 2021-12-02 RX ADMIN — HUMAN INSULIN 10 UNIT(S): 100 INJECTION, SUSPENSION SUBCUTANEOUS at 05:12

## 2021-12-02 RX ADMIN — CEFEPIME 100 MILLIGRAM(S): 1 INJECTION, POWDER, FOR SOLUTION INTRAMUSCULAR; INTRAVENOUS at 23:23

## 2021-12-02 RX ADMIN — ATORVASTATIN CALCIUM 40 MILLIGRAM(S): 80 TABLET, FILM COATED ORAL at 23:23

## 2021-12-02 RX ADMIN — CHLORHEXIDINE GLUCONATE 15 MILLILITER(S): 213 SOLUTION TOPICAL at 16:54

## 2021-12-02 RX ADMIN — Medication 300 MILLIGRAM(S): at 05:13

## 2021-12-02 RX ADMIN — HUMAN INSULIN 10 UNIT(S): 100 INJECTION, SUSPENSION SUBCUTANEOUS at 11:49

## 2021-12-02 RX ADMIN — HUMAN INSULIN 10 UNIT(S): 100 INJECTION, SUSPENSION SUBCUTANEOUS at 16:57

## 2021-12-02 RX ADMIN — HEPARIN SODIUM 5000 UNIT(S): 5000 INJECTION INTRAVENOUS; SUBCUTANEOUS at 05:13

## 2021-12-02 RX ADMIN — Medication 125 MILLIGRAM(S): at 23:23

## 2021-12-02 RX ADMIN — Medication 100 MILLIGRAM(S): at 17:00

## 2021-12-02 RX ADMIN — Medication 1 DROP(S): at 16:57

## 2021-12-02 RX ADMIN — SODIUM CHLORIDE 3 MILLILITER(S): 9 INJECTION INTRAMUSCULAR; INTRAVENOUS; SUBCUTANEOUS at 10:12

## 2021-12-02 RX ADMIN — SODIUM CHLORIDE 3 MILLILITER(S): 9 INJECTION INTRAMUSCULAR; INTRAVENOUS; SUBCUTANEOUS at 17:06

## 2021-12-02 RX ADMIN — Medication 2: at 05:11

## 2021-12-02 RX ADMIN — HUMAN INSULIN 10 UNIT(S): 100 INJECTION, SUSPENSION SUBCUTANEOUS at 00:07

## 2021-12-02 RX ADMIN — Medication 1 DROP(S): at 05:11

## 2021-12-02 RX ADMIN — NYSTATIN CREAM 1 APPLICATION(S): 100000 CREAM TOPICAL at 16:57

## 2021-12-02 RX ADMIN — NYSTATIN CREAM 1 APPLICATION(S): 100000 CREAM TOPICAL at 06:36

## 2021-12-02 RX ADMIN — Medication 125 MILLIGRAM(S): at 13:21

## 2021-12-02 RX ADMIN — CHLORHEXIDINE GLUCONATE 15 MILLILITER(S): 213 SOLUTION TOPICAL at 19:17

## 2021-12-02 RX ADMIN — Medication 1 DROP(S): at 23:18

## 2021-12-02 RX ADMIN — APIXABAN 5 MILLIGRAM(S): 2.5 TABLET, FILM COATED ORAL at 17:06

## 2021-12-02 RX ADMIN — Medication 100 MILLIGRAM(S): at 05:13

## 2021-12-02 RX ADMIN — Medication 300 MILLIGRAM(S): at 17:01

## 2021-12-02 RX ADMIN — SODIUM CHLORIDE 3 MILLILITER(S): 9 INJECTION INTRAMUSCULAR; INTRAVENOUS; SUBCUTANEOUS at 00:09

## 2021-12-02 RX ADMIN — CHLORHEXIDINE GLUCONATE 1 APPLICATION(S): 213 SOLUTION TOPICAL at 01:34

## 2021-12-02 RX ADMIN — Medication 1 DROP(S): at 00:08

## 2021-12-02 RX ADMIN — Medication 1 DROP(S): at 12:12

## 2021-12-02 RX ADMIN — Medication 125 MILLIGRAM(S): at 05:13

## 2021-12-02 RX ADMIN — Medication 2: at 00:07

## 2021-12-02 RX ADMIN — Medication 4: at 23:24

## 2021-12-02 RX ADMIN — Medication 2: at 11:48

## 2021-12-02 RX ADMIN — Medication 125 MILLIGRAM(S): at 16:55

## 2021-12-02 RX ADMIN — ATORVASTATIN CALCIUM 40 MILLIGRAM(S): 80 TABLET, FILM COATED ORAL at 00:08

## 2021-12-02 RX ADMIN — HEPARIN SODIUM 5000 UNIT(S): 5000 INJECTION INTRAVENOUS; SUBCUTANEOUS at 00:08

## 2021-12-02 RX ADMIN — HUMAN INSULIN 10 UNIT(S): 100 INJECTION, SUSPENSION SUBCUTANEOUS at 23:19

## 2021-12-02 RX ADMIN — SODIUM CHLORIDE 3 MILLILITER(S): 9 INJECTION INTRAMUSCULAR; INTRAVENOUS; SUBCUTANEOUS at 05:12

## 2021-12-02 RX ADMIN — SODIUM CHLORIDE 3 MILLILITER(S): 9 INJECTION INTRAMUSCULAR; INTRAVENOUS; SUBCUTANEOUS at 23:22

## 2021-12-02 NOTE — PROGRESS NOTE ADULT - SUBJECTIVE AND OBJECTIVE BOX
Patient is a 61y old  Male who presents with a chief complaint of ICH (01 Dec 2021 09:20)      SUBJECTIVE / OVERNIGHT EVENTS: Pt more alert today. Able to squeeze w R hand.     MEDICATIONS  (STANDING):  apixaban 5 milliGRAM(s) Oral every 12 hours  artificial  tears Solution 1 Drop(s) Both EYES every 6 hours  atorvastatin 40 milliGRAM(s) Oral at bedtime  chlorhexidine 0.12% Liquid 15 milliLiter(s) Oral Mucosa two times a day  chlorhexidine 4% Liquid 1 Application(s) Topical <User Schedule>  dextrose 50% Injectable 25 Gram(s) IV Push once  dextrose 50% Injectable 12.5 Gram(s) IV Push once  insulin lispro (ADMELOG) corrective regimen sliding scale   SubCutaneous every 6 hours  insulin NPH human recombinant 10 Unit(s) SubCutaneous every 6 hours  labetalol 100 milliGRAM(s) Oral two times a day  nystatin Powder 1 Application(s) Topical two times a day  sodium chloride 3%  Inhalation 3 milliLiter(s) Inhalation every 6 hours  tobramycin for Nebulization 300 milliGRAM(s) Inhalation every 12 hours  vancomycin    Solution 125 milliGRAM(s) Oral every 6 hours    MEDICATIONS  (PRN):  acetaminophen    Suspension .. 650 milliGRAM(s) Oral every 6 hours PRN Temp greater or equal to 38C (100.4F), Mild Pain (1 - 3)      CAPILLARY BLOOD GLUCOSE      POCT Blood Glucose.: 180 mg/dL (02 Dec 2021 11:43)  POCT Blood Glucose.: 167 mg/dL (02 Dec 2021 05:10)  POCT Blood Glucose.: 193 mg/dL (01 Dec 2021 23:58)  POCT Blood Glucose.: 174 mg/dL (01 Dec 2021 17:33)    I&O's Summary    01 Dec 2021 07:01  -  02 Dec 2021 07:00  --------------------------------------------------------  IN: 0 mL / OUT: 2050 mL / NET: -2050 mL        PHYSICAL EXAM:  T(C): 37.1 (12-02-21 @ 11:36), Max: 37.2 (12-02-21 @ 08:45)  HR: 90 (12-02-21 @ 11:36) (80 - 90)  BP: 124/68 (12-02-21 @ 11:36) (101/65 - 131/79)  RR: 18 (12-02-21 @ 11:36) (18 - 18)  SpO2: 99% (12-02-21 @ 11:36) (96% - 99%)  CONSTITUTIONAL: NAD, well-developed, well-groomed  EYES: PERRLA; conjunctiva and sclera clear  ENMT: Moist oral mucosa,   NECK: Supple, no palpable masses; trach in place  RESPIRATORY: Normal respiratory effort; coarse breath sounds bilaterally  CARDIOVASCULAR: Regular rate and rhythm, normal S1 and S2, no murmur/rub/gallop; Lower extremity edema;   ABDOMEN: Nontender to palpation, normoactive bowel sounds, no rebound/guarding; No hepatosplenomegaly  MUSCULOSKELETAL:  No clubbing or cyanosis of digits; no joint swelling or tenderness to palpation  PSYCH: Calm, not awake  NEUROLOGY: Unable to assess  SKIN: No rashes; no palpable lesions    LABS:          RADIOLOGY & ADDITIONAL TESTS:    Imaging Personally Reviewed:    Consultant(s) Notes Reviewed:      Care Discussed with Consultants/Other Providers: Carolyn

## 2021-12-02 NOTE — PROGRESS NOTE ADULT - SUBJECTIVE AND OBJECTIVE BOX
Vascular Cardiology  Progress note  EMAIL [email or web address]     OFFICE [phone]    INTERVAL HISTORY:  -No acute events overnight.     Allergies  penicillin (Other)    MEDICATIONS:  apixaban 5 milliGRAM(s) Oral every 12 hours  labetalol 100 milliGRAM(s) Oral two times a day  tobramycin for Nebulization 300 milliGRAM(s) Inhalation every 12 hours  vancomycin    Solution 125 milliGRAM(s) Oral every 6 hours  sodium chloride 3%  Inhalation 3 milliLiter(s) Inhalation every 6 hours  acetaminophen    Suspension .. 650 milliGRAM(s) Oral every 6 hours PRN  atorvastatin 40 milliGRAM(s) Oral at bedtime  dextrose 50% Injectable 25 Gram(s) IV Push once  dextrose 50% Injectable 12.5 Gram(s) IV Push once  insulin lispro (ADMELOG) corrective regimen sliding scale   SubCutaneous every 6 hours  insulin NPH human recombinant 10 Unit(s) SubCutaneous every 6 hours  artificial  tears Solution 1 Drop(s) Both EYES every 6 hours  chlorhexidine 0.12% Liquid 15 milliLiter(s) Oral Mucosa two times a day  chlorhexidine 4% Liquid 1 Application(s) Topical <User Schedule>  nystatin Powder 1 Application(s) Topical two times a day    PAST MEDICAL & SURGICAL HISTORY:  HTN (hypertension)  Diabetes mellitus    FAMILY HISTORY:    SOCIAL HISTORY:  unchanged    REVIEW OF SYSTEMS:  -Limited 2/2 to patient factors; patient is non-verbal.  [ ] Unable to obtain    PHYSICAL EXAM:  T(C): 37.2 (12-02-21 @ 08:45), Max: 37.7 (12-01-21 @ 11:43)  HR: 80 (12-02-21 @ 08:45) (80 - 90)  BP: 127/74 (12-02-21 @ 08:45) (101/65 - 131/79)  RR: 18 (12-02-21 @ 08:45) (18 - 18)  SpO2: 99% (12-02-21 @ 08:45) (96% - 99%)    Wt(kg): --  I&O's Summary    01 Dec 2021 07:01  -  02 Dec 2021 07:00  --------------------------------------------------------  IN: 0 mL / OUT: 2050 mL / NET: -2050 mL    Appearance: Frail appearing, nonverbal, with trach and PEG  HEENT:   Normal oral mucosa, PERRL, EOMI	  Lymphatic: No lymphadenopathy  Cardiovascular:  S1, S2, RRR, no RMG  Respiratory:  CTA b/l  Psychiatry:  Nonverbal; nods head upon command  Gastrointestinal:  Soft, Non-tender, + BS	  Skin: No rashes, No ecchymoses, No cyanosis	  Extremities:  B/l LE with no edema, asymmetry of girth, skin changes; extremities are warm with no ulcers or wounds, no phlegmasia    Vascular Pulse Exam:  Right DP: [x]palpable []non-palpable []audible      Left DP :   [x]palpable []non-palpable []audible  Right PT: [x]palpable [] non-palpable []audible   Left PT:  [x] palpable [] non-palpable []audible      LABS:	 	  -Pending for today.      Assessment:  1. 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 EVD clamped & removed.  2. C. difficile diarrhea  --PCR positive. likely 2/2 to Abx use  3. Gram-negative pneumonia.   --s/p 7 day course of cefepime on 11/21; was started on levofloxacin for 11/24 sputum cx with pseudomonas  4. Respiratory failure.   --s/p trach. on trach collar  5. B/l below knee DVTs  --Right soleal vein DVT and persistent left posterior tibial, peroneal, gastrocnemius and soleal vein DVT without proximal propagation on last duplex on 11/24    8. CAD   --s/p stent and on DAPT  --ASA to be resumed when cleared by NSG; on statin    Plan:  1. Cleared by NSG for full dose AC; start Eliquis 5mg PO BID today for b/l below the knee DVTs  2. Continue serial b/l LE VA duplexes for DVT propagation surveillance; repeat duplex was ordered. Spoke to vascular lab ([phone]) and patient is scheduled for 4pm today (as the last study so as to coordinate with terminal cleaning procedures in setting of contact precautions).    Thank you    Vascular Cardiology Service    Please call with any questions:   Service Line: [phone]  Office [phone]  email:  [email or web address]

## 2021-12-02 NOTE — PROGRESS NOTE ADULT - SUBJECTIVE AND OBJECTIVE BOX
Chief Complaint:  Patient is a 61y old  Male who presents with a chief complaint of AMS (17 Nov 2021 13:07)          Interval Events:   no BM today    Hospital Medications:        Review of Systems:  General:  No wt loss, fevers, chills, night sweats, fatigue,   Eyes:  Good vision, no reported pain  ENT:  No sore throat, pain, runny nose, dysphagia  CV:  No pain, palpitations, hypo/hypertension  Resp:  No dyspnea, cough, tachypnea, wheezing  GI:  See HPI  :  No pain, bleeding, incontinence, nocturia  Muscle:  No pain, weakness  Neuro:  No weakness, tingling, memory problems  Psych:  No fatigue, insomnia, mood problems, depression  Endocrine:  No polyuria, polydipsia, cold/heat intolerance  Heme:  No petechiae, ecchymosis, easy bruisability  Integumentary:  No rash, edema    PHYSICAL EXAM:   Vital Signs Last 24 Hrs  T(C): 36.7 (02 Dec 2021 19:00), Max: 37.2 (02 Dec 2021 08:45)  T(F): 98.1 (02 Dec 2021 19:00), Max: 98.9 (02 Dec 2021 08:45)  HR: 76 (02 Dec 2021 19:00) (76 - 93)  BP: 120/68 (02 Dec 2021 19:00) (101/65 - 130/72)  BP(mean): --  RR: 18 (02 Dec 2021 19:00) (18 - 18)  SpO2: 97% (02 Dec 2021 19:00) (96% - 100%)  PHYSICAL EXAM:     GENERAL:  Appears stated age, well-groomed, well-nourished, no distress  HEENT:  NC/AT,  conjunctivae anicteric, clear and pink,   NECK: supple, trachea midline  CHEST:  Full & symmetric excursion, no increased effort, breath sounds clear  HEART:  Regular rhythm, no JVD  ABDOMEN:  Soft, non-tender, non-distended, normoactive bowel sounds,  no masses , no hepatosplenomegaly  EXTREMITIES:  no cyanosis,clubbing or edema  SKIN:  No rash, erythema, or, ecchymoses, no jaundice  NEURO:  Alert, non-focal, no asterixis  PSYCH: Appropriate affect, oriented to place and time  RECTAL: Deferred      LABS Personally reviewed by me:

## 2021-12-02 NOTE — PROGRESS NOTE ADULT - ASSESSMENT
61 year old man with respiratory failure d/t ICH    1. ICH  -per neurosurgery, s/p EVD  -for  shunt    2. Respiratory failure  -for tracheostomy, will require long term enteral nutrition  -s/p PEG, tolerating feeds  - aspiration precautions     3. Enterobacter PNA  -s/p course of antibiotics     4. afib with RVR    5. Anemia, no overt GI bleed. EGD unremarkable.  -trend CBC    6. Cdiff infection on PO vanco  -no BM documented today    [facility]  Gastroenterology and Hepatology  [address] [place], [place]  Office: [phone]  Cell: [phone]

## 2021-12-02 NOTE — PROGRESS NOTE ADULT - SUBJECTIVE AND OBJECTIVE BOX
SUBJECTIVE: Patient seen and examined.  Patient non verbal.  He is more alert than 2 days ago.      Vital Signs Last 24 Hrs  T(C): 36.9 (02 Dec 2021 04:51), Max: 38.5 (01 Dec 2021 10:00)  T(F): 98.4 (02 Dec 2021 04:51), Max: 101.3 (01 Dec 2021 10:00)  HR: 83 (02 Dec 2021 04:51) (83 - 90)  BP: 120/72 (02 Dec 2021 04:51) (101/65 - 131/79)  BP(mean): --  RR: 18 (02 Dec 2021 04:51) (18 - 18)  SpO2: 99% (02 Dec 2021 04:51) (96% - 99%)    PHYSICAL EXAM:    Constitutional: No Acute Distress     Neurological: alert non verbal trached.  uppers wds, lowers trace movement, follow commands wiggles toes.      Pulmonary: Clear to Auscultation, No rales, No rhonchi, No wheezes but has alot of secreations in trach     Cardiovascular: S1, S2, Regular rate and rhythm     Gastrointestinal: Soft, Non-tender, Non-distended     Extremities: No calf tenderness     Incision: c/d/i  LABS:           12-01 @ 07:01  -  12-02 @ 07:00  --------------------------------------------------------  IN: 0 mL / OUT: 2050 mL / NET: -2050 mL    MEDICATIONS:  Anticoagulation:   heparin   Injectable 5000 Unit(s) SubCutaneous every 8 hours    Antibiotics:  tobramycin for Nebulization 300 milliGRAM(s) Inhalation every 12 hours  vancomycin    Solution 125 milliGRAM(s) Oral every 6 hours    Endo:  atorvastatin 40 milliGRAM(s) Oral at bedtime  dextrose 50% Injectable 25 Gram(s) IV Push once  dextrose 50% Injectable 12.5 Gram(s) IV Push once  insulin lispro (ADMELOG) corrective regimen sliding scale   SubCutaneous every 6 hours  insulin NPH human recombinant 10 Unit(s) SubCutaneous every 6 hours    Neuro:  acetaminophen    Suspension .. 650 milliGRAM(s) Oral every 6 hours PRN Temp greater or equal to 38C (100.4F), Mild Pain (1 - 3)    Cardiac:  labetalol 100 milliGRAM(s) Oral two times a day    Pulm:  sodium chloride 3%  Inhalation 3 milliLiter(s) Inhalation every 6 hours    GI/:    Other:   artificial  tears Solution 1 Drop(s) Both EYES every 6 hours  chlorhexidine 0.12% Liquid 15 milliLiter(s) Oral Mucosa two times a day  chlorhexidine 4% Liquid 1 Application(s) Topical <User Schedule>  nystatin Powder 1 Application(s) Topical two times a day    DIET: peg feeds at 80    IMAGING:

## 2021-12-02 NOTE — PROGRESS NOTE ADULT - SUBJECTIVE AND OBJECTIVE BOX
CC: f/u for fever, c diff & tracheobronchitis      Patient reports nothing     REVIEW OF SYSTEMS:  unobtainable - no verbal on trach collar     Antimicrobials Day #  :   tobramycin for Nebulization 300 milliGRAM(s) Inhalation every 12 hours  vancomycin    Solution 125 milliGRAM(s) Oral every 6 hours --- D # 7     Other Medications Reviewed    T(F): 98.8 (12-02-21 @ 11:36), Max: 98.9 (12-02-21 @ 08:45)  HR: 90 (12-02-21 @ 11:36)  BP: 124/68 (12-02-21 @ 11:36)  RR: 18 (12-02-21 @ 11:36)  SpO2: 99% (12-02-21 @ 11:36)  Wt(kg): --    PHYSICAL EXAM:  General: non verbal, non responsive to stimuli. Skull staples present - incision healed   Eyes:  anicteric, no conjunctival injection, no discharge  Oropharynx: trach +, copious secretions but tolerates TC without a problem 	  Neck: supple  Lungs: clear to auscultation  Heart: regular rate and rhythm; no murmurs  Abdomen: soft, nondistended, nontender, incisions - C/D/I. Peg +  Skin: no lesions  Extremities: no clubbing, cyanosis, or edema  Neurologic: non responsive     LAB RESULTS:          MICROBIOLOGY:  RECENT CULTURES:  11-28 @ 22:23 .Sputum Pseudomonas aeruginosa (Carbapenem Resistant)  Streptococcus pneumoniae    Moderate Pseudomonas aeruginosa (Carbapenem Resistant)  Moderate Streptococcus pneumoniae  Therapy requires maximum dose of Ceftriaxone and/or  Penicillin. Interpretive criteria as follows:  Ceftriaxone breakpoints for meningitis infections:  <=0.5=Sensitive, 1.0=Intermediate, >=2.0=Resistant  Penicillin breakpoints for meningitis infections:  <=0.06=Sensitive, >= 0.12=Resistant  Ceftriaxone breakpoints for non-meningitis infections:  <=1.0=Sensitive, 2.0=Intermediate, >=4.0=Resistant  Penicillin breakpoints for non-meningitis infections:  <=2.0=Sensitive, 4.0=Intermediate, >=8.0=Resistant  Oral Penicillin breakpoints:  <=0.06=Sensitive, 0.12-1.0=Intermediate, >=2.0=Resistant  Please note: In case of suspected meningitis, CSF  interpretive criteria must be used independent of specimen source.  Normal Respiratory Susan absent    Numerous polymorphonuclear leukocytes per low power field  Few Squamous epithelial cells per low power field  Numerous Gram Positive Cocci in Pairs and Chains per oil power field  Moderate Gram Negative Rods per oil power field  Few Gram Positive Rods per oil power field    11-28 @ 18:31 Catheterized      No growth      11-28 @ 14:32 .Blood Blood-Peripheral     No growth to date.                RADIOLOGY REVIEWED:

## 2021-12-02 NOTE — PROGRESS NOTE ADULT - ASSESSMENT
62 yo male with HTN admitted 11/4 with severe headache and found to have cerebella bleed.  He is s/p posterior fossa decompression on 11/4 followed by craniectomy and PICA aneurysm resection on 11/11 and placement of VPS.  S/p trach and peg. He has distal DVT's and is off anticoagulation.  He received cefepime 11/4-11/21 for respiratory coverage.  He developed watery diarrhea on 11/26 and was diagnosed with C diff.  Was restarted on cefepime on 11/28 for concerns of pneumonia since had low grade temps.  CXR's remain clear.  Ct of brain showed residual blood.  Pseudomonas in sputum found to be resistant to carbapenems & quinolones.  However, sputum now also isolating strep pneumoniae ? contributing to tracheobronchitis as well    11/28 urine and blood cultures are negative at 48 hours.    Suggest:  1. Continue enteral vanco for C diff  2. Will re-add Cefepime to Giorgio nebs, in order to treat both bacteria from sputum cx   3. With normal wbc I am not convinced low grade fevers represents uncontrolled infection - anticipate to limit systemic antibiotics to another 48 - 72 hrs total.  4. Supportive care, await neuro recovery

## 2021-12-02 NOTE — PROGRESS NOTE ADULT - ASSESSMENT
61M hx HTN, DM, cardiac stent on ASA. At work complaining of HA ~8:30, starting feeling dizzy and vomiting, HTN/keke when EMS got to him. SBP 220s, HR 50s.     On EMS arrival at 09:39AM 11/4/21, patient seen talking a little, garbling, moving all extremities, then quickly became unresponsive. No known blood thinners.    CTH shows large posterior fossa bleed w/ IVH/SAH/hydro.    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, s/p crani for PICA aneurysm resection on 11/10/2021, s/p trach/PEG on 11/19/21, s/p insertion of right parietal ventriculoperitoneal shunt (Certas @ 4) on 11/23/2021      PLAN:  Neuro:   -q4 hour neuro checks  -tylenol for pain control and fevers  -out of bed with assistance  -pt/ot - subacute rehab upon discharge  -VPS Certas @4  Respiratory:   - on trach collar  - suction prn- moderate amount of secretions  - continue 3% sodium chloride inhalation  CV:  -keep sbp 100-140  -labetalol for hypertension  -atorvastatin for hyperlipidemia   Endocrine:    -nph and JOSE MARTIN for glucose control  -keep fingersticks 100-180  DVT ppx:   - hep sq  - persistent bilateral below knee dvt  - Dr. Camilo laguna for full dose AV but per vascular cardiology no need at this time   ID:   -afebrile   -po vanco for c. diff  -ID following  - Gram-negative pneumonia- sputum culture grew enterobacter and most recently pseudomonas.  - febrile yesterday 38.5 10 am   - on vanco po for cdiff and ulises nebulizers per ID  GI:    - tube feeds  PT/OT:   - [facility]      Spectralink # [id number]

## 2021-12-02 NOTE — PROGRESS NOTE ADULT - PROBLEM SELECTOR PLAN 6
right soleal vein DVT and persistent left posterior tibial, peroneal, gastrocnemius and soleal vein DVT without proximal propagation on last duplex on 11/24  - c/w serial duplex to monitor for propagation  - started on Eliquis today

## 2021-12-02 NOTE — PROGRESS NOTE ADULT - ATTENDING COMMENTS
Below knee DVT, immobile, d/w Dr. Page who is ok with starting DOAC  will start eliquis 5mg BID  Stop DVT ppx    duplex today      [facility] [phone]

## 2021-12-02 NOTE — PROGRESS NOTE ADULT - ASSESSMENT
60 yo male with PMH of HTN, CAD s/p stent on DAPT, T2DM who complained of headache and subsequently become unresponsive. 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 EVD clamped & removed.    NSCU course also c/b respiratory failure and dysphagia now s/p trach and PEG on 11/19. S/p VPS placement on 11/23. Completed course of cefepime for enterobacter and pseudomonas pneumonia on 11/21. Transferred out of NSCU on 11/25. Began to spike fevers with watery output from rectal tube found to be positive for c. diff, PO vanco started on 11/26.     Persistent fever, increased secretions, sputum colonized by Pseudomonas, Cefepime restarted on 11/28.     Last fever 12/1 at 10 am. Appears more alert today, able to squeeze w R hand.

## 2021-12-02 NOTE — CHART NOTE - NSCHARTNOTEFT_GEN_A_CORE
Spoke with Dr. Villar.  With attending to attending high discission making, eliquis is being started 5 bid for DVT.  Both Dr. Page and Dr. Villar in agreement

## 2021-12-03 LAB
ANION GAP SERPL CALC-SCNC: 9 MMOL/L — SIGNIFICANT CHANGE UP (ref 5–17)
BUN SERPL-MCNC: 32 MG/DL — HIGH (ref 7–23)
CALCIUM SERPL-MCNC: 8.9 MG/DL — SIGNIFICANT CHANGE UP (ref 8.4–10.5)
CHLORIDE SERPL-SCNC: 95 MMOL/L — LOW (ref 96–108)
CO2 SERPL-SCNC: 29 MMOL/L — SIGNIFICANT CHANGE UP (ref 22–31)
CREAT SERPL-MCNC: 0.93 MG/DL — SIGNIFICANT CHANGE UP (ref 0.5–1.3)
CULTURE RESULTS: SIGNIFICANT CHANGE UP
CULTURE RESULTS: SIGNIFICANT CHANGE UP
GLUCOSE SERPL-MCNC: 164 MG/DL — HIGH (ref 70–99)
HCT VFR BLD CALC: 28.3 % — LOW (ref 39–50)
HGB BLD-MCNC: 8.8 G/DL — LOW (ref 13–17)
MCHC RBC-ENTMCNC: 30 PG — SIGNIFICANT CHANGE UP (ref 27–34)
MCHC RBC-ENTMCNC: 31.1 GM/DL — LOW (ref 32–36)
MCV RBC AUTO: 96.6 FL — SIGNIFICANT CHANGE UP (ref 80–100)
NRBC # BLD: 0 /100 WBCS — SIGNIFICANT CHANGE UP (ref 0–0)
PLATELET # BLD AUTO: 427 K/UL — HIGH (ref 150–400)
POTASSIUM SERPL-MCNC: 4.9 MMOL/L — SIGNIFICANT CHANGE UP (ref 3.5–5.3)
POTASSIUM SERPL-SCNC: 4.9 MMOL/L — SIGNIFICANT CHANGE UP (ref 3.5–5.3)
RBC # BLD: 2.93 M/UL — LOW (ref 4.2–5.8)
RBC # FLD: 13.2 % — SIGNIFICANT CHANGE UP (ref 10.3–14.5)
SODIUM SERPL-SCNC: 133 MMOL/L — LOW (ref 135–145)
SPECIMEN SOURCE: SIGNIFICANT CHANGE UP
SPECIMEN SOURCE: SIGNIFICANT CHANGE UP
WBC # BLD: 7.28 K/UL — SIGNIFICANT CHANGE UP (ref 3.8–10.5)
WBC # FLD AUTO: 7.28 K/UL — SIGNIFICANT CHANGE UP (ref 3.8–10.5)

## 2021-12-03 PROCEDURE — 99232 SBSQ HOSP IP/OBS MODERATE 35: CPT

## 2021-12-03 PROCEDURE — 99233 SBSQ HOSP IP/OBS HIGH 50: CPT

## 2021-12-03 PROCEDURE — 93970 EXTREMITY STUDY: CPT | Mod: 26

## 2021-12-03 RX ORDER — CEFEPIME 1 G/1
2000 INJECTION, POWDER, FOR SOLUTION INTRAMUSCULAR; INTRAVENOUS EVERY 8 HOURS
Refills: 0 | Status: DISCONTINUED | OUTPATIENT
Start: 2021-12-03 | End: 2021-12-06

## 2021-12-03 RX ORDER — HUMAN INSULIN 100 [IU]/ML
12 INJECTION, SUSPENSION SUBCUTANEOUS EVERY 6 HOURS
Refills: 0 | Status: DISCONTINUED | OUTPATIENT
Start: 2021-12-03 | End: 2021-12-20

## 2021-12-03 RX ADMIN — Medication 2: at 16:45

## 2021-12-03 RX ADMIN — Medication 650 MILLIGRAM(S): at 23:42

## 2021-12-03 RX ADMIN — Medication 300 MILLIGRAM(S): at 05:17

## 2021-12-03 RX ADMIN — Medication 1 DROP(S): at 16:43

## 2021-12-03 RX ADMIN — Medication 1 DROP(S): at 23:41

## 2021-12-03 RX ADMIN — CEFEPIME 100 MILLIGRAM(S): 1 INJECTION, POWDER, FOR SOLUTION INTRAMUSCULAR; INTRAVENOUS at 12:47

## 2021-12-03 RX ADMIN — Medication 1 DROP(S): at 10:24

## 2021-12-03 RX ADMIN — Medication 125 MILLIGRAM(S): at 05:13

## 2021-12-03 RX ADMIN — APIXABAN 5 MILLIGRAM(S): 2.5 TABLET, FILM COATED ORAL at 16:51

## 2021-12-03 RX ADMIN — Medication 125 MILLIGRAM(S): at 16:49

## 2021-12-03 RX ADMIN — HUMAN INSULIN 12 UNIT(S): 100 INJECTION, SUSPENSION SUBCUTANEOUS at 23:41

## 2021-12-03 RX ADMIN — Medication 1 DROP(S): at 05:13

## 2021-12-03 RX ADMIN — HUMAN INSULIN 10 UNIT(S): 100 INJECTION, SUSPENSION SUBCUTANEOUS at 05:16

## 2021-12-03 RX ADMIN — CHLORHEXIDINE GLUCONATE 15 MILLILITER(S): 213 SOLUTION TOPICAL at 05:14

## 2021-12-03 RX ADMIN — SODIUM CHLORIDE 3 MILLILITER(S): 9 INJECTION INTRAMUSCULAR; INTRAVENOUS; SUBCUTANEOUS at 05:18

## 2021-12-03 RX ADMIN — CHLORHEXIDINE GLUCONATE 15 MILLILITER(S): 213 SOLUTION TOPICAL at 16:43

## 2021-12-03 RX ADMIN — NYSTATIN CREAM 1 APPLICATION(S): 100000 CREAM TOPICAL at 05:23

## 2021-12-03 RX ADMIN — CEFEPIME 100 MILLIGRAM(S): 1 INJECTION, POWDER, FOR SOLUTION INTRAMUSCULAR; INTRAVENOUS at 23:36

## 2021-12-03 RX ADMIN — Medication 300 MILLIGRAM(S): at 16:49

## 2021-12-03 RX ADMIN — Medication 100 MILLIGRAM(S): at 05:23

## 2021-12-03 RX ADMIN — Medication 2: at 05:15

## 2021-12-03 RX ADMIN — SODIUM CHLORIDE 3 MILLILITER(S): 9 INJECTION INTRAMUSCULAR; INTRAVENOUS; SUBCUTANEOUS at 10:25

## 2021-12-03 RX ADMIN — Medication 125 MILLIGRAM(S): at 10:27

## 2021-12-03 RX ADMIN — Medication 2: at 12:26

## 2021-12-03 RX ADMIN — Medication 125 MILLIGRAM(S): at 23:40

## 2021-12-03 RX ADMIN — HUMAN INSULIN 10 UNIT(S): 100 INJECTION, SUSPENSION SUBCUTANEOUS at 12:42

## 2021-12-03 RX ADMIN — SODIUM CHLORIDE 3 MILLILITER(S): 9 INJECTION INTRAMUSCULAR; INTRAVENOUS; SUBCUTANEOUS at 23:41

## 2021-12-03 RX ADMIN — HUMAN INSULIN 12 UNIT(S): 100 INJECTION, SUSPENSION SUBCUTANEOUS at 16:44

## 2021-12-03 RX ADMIN — Medication 4: at 23:40

## 2021-12-03 RX ADMIN — Medication 100 MILLIGRAM(S): at 16:50

## 2021-12-03 RX ADMIN — APIXABAN 5 MILLIGRAM(S): 2.5 TABLET, FILM COATED ORAL at 05:14

## 2021-12-03 RX ADMIN — CEFEPIME 100 MILLIGRAM(S): 1 INJECTION, POWDER, FOR SOLUTION INTRAMUSCULAR; INTRAVENOUS at 05:14

## 2021-12-03 RX ADMIN — ATORVASTATIN CALCIUM 40 MILLIGRAM(S): 80 TABLET, FILM COATED ORAL at 23:42

## 2021-12-03 NOTE — PROGRESS NOTE ADULT - SUBJECTIVE AND OBJECTIVE BOX
CC: f/u for tracheobronchitis and C diff    Patient reports: he is comfortable on TC, tolerating enteral feeds    REVIEW OF SYSTEMS:  All other review of systems negative (Comprehensive ROS): limited by condition, diarrhea decreased    Antimicrobials Day #  :  cefepime   IVPB 2000 milliGRAM(s) IV Intermittent three times a day day 2  tobramycin for Nebulization 300 milliGRAM(s) Inhalation every 12 hours day 3  vancomycin    Solution 125 milliGRAM(s) Oral every 6 hours day 8    Other Medications Reviewed  MEDICATIONS  (STANDING):  apixaban 5 milliGRAM(s) Oral every 12 hours  artificial  tears Solution 1 Drop(s) Both EYES every 6 hours  atorvastatin 40 milliGRAM(s) Oral at bedtime  cefepime   IVPB 2000 milliGRAM(s) IV Intermittent three times a day  chlorhexidine 0.12% Liquid 15 milliLiter(s) Oral Mucosa two times a day  chlorhexidine 4% Liquid 1 Application(s) Topical <User Schedule>  dextrose 50% Injectable 25 Gram(s) IV Push once  dextrose 50% Injectable 12.5 Gram(s) IV Push once  insulin lispro (ADMELOG) corrective regimen sliding scale   SubCutaneous every 6 hours  insulin NPH human recombinant 10 Unit(s) SubCutaneous every 6 hours  labetalol 100 milliGRAM(s) Oral two times a day  nystatin Powder 1 Application(s) Topical two times a day  sodium chloride 3%  Inhalation 3 milliLiter(s) Inhalation every 6 hours  tobramycin for Nebulization 300 milliGRAM(s) Inhalation every 12 hours  vancomycin    Solution 125 milliGRAM(s) Oral every 6 hours    T(F): 99.1 (12-03-21 @ 05:39), Max: 99.1 (12-03-21 @ 05:39)  HR: 84 (12-03-21 @ 05:39)  BP: 136/79 (12-03-21 @ 05:39)  RR: 18 (12-03-21 @ 05:39)  SpO2: 95% (12-03-21 @ 05:39)  Wt(kg): --    PHYSICAL EXAM:  General: alert, no acute distress  Eyes:  anicteric, no conjunctival injection, no discharge  Oropharynx: no lesions or injection 	  Neck: supple, trach  Lungs: few ronchi  Heart: regular rate and rhythm; no murmur, rubs or gallops  Abdomen: soft, nondistended, nontender, without mass or organomegaly, peg in place  Skin: no lesions  Extremities: no clubbing, cyanosis, or edema  Neurologic: alert, oriented, poorly interactive    LAB RESULTS:              MICROBIOLOGY:  RECENT CULTURES:  11-28 @ 22:23 .Sputum Sputum Pseudomonas aeruginosa (Carbapenem Resistant)  Streptococcus pneumoniae    Moderate Pseudomonas aeruginosa (Carbapenem Resistant)  Moderate Streptococcus pneumoniae  Therapy requires maximum dose of Ceftriaxone and/or  Penicillin. Interpretive criteria as follows:  Ceftriaxone breakpoints for meningitis infections:  <=0.5=Sensitive, 1.0=Intermediate, >=2.0=Resistant  Penicillin breakpoints for meningitis infections:  <=0.06=Sensitive, >= 0.12=Resistant  Ceftriaxone breakpoints for non-meningitis infections:  <=1.0=Sensitive, 2.0=Intermediate, >=4.0=Resistant  Penicillin breakpoints for non-meningitis infections:  <=2.0=Sensitive, 4.0=Intermediate, >=8.0=Resistant  Oral Penicillin breakpoints:  <=0.06=Sensitive, 0.12-1.0=Intermediate, >=2.0=Resistant  Please note: In case of suspected meningitis, CSF  interpretive criteria must be used independent of specimen source.  Normal Respiratory Susan absent    Numerous polymorphonuclear leukocytes per low power field  Few Squamous epithelial cells per low power field  Numerous Gram Positive Cocci in Pairs and Chains per oil power field  Moderate Gram Negative Rods per oil power field  Few Gram Positive Rods per oil power field    11-28 @ 18:31 Catheterized Catheterized     No growth      11-28 @ 14:32 .Blood Blood-Peripheral     No growth to date.          RADIOLOGY REVIEWED:  < from: Xray Chest 1 View- PORTABLE-Urgent (Xray Chest 1 View- PORTABLE-Urgent .) (11.28.21 @ 09:06) >  INTERPRETATION:  HISTORY: Fever. Status post tracheostomy.    TECHNIQUE: A single AP view of the chest was obtained.    COMPARISON: 11/26/2021    FINDINGS:  The cardiac silhouette is normal in size. There is a tracheostomy tube in place. There are no focal consolidations or pleural effusions. The hilar and mediastinal structures appear unremarkable. The osseous structures are intact.    IMPRESSION: Clear lungs.    < end of copied text >

## 2021-12-03 NOTE — PROGRESS NOTE ADULT - SUBJECTIVE AND OBJECTIVE BOX
INTERVAL HPI/OVERNIGHT EVENTS:  pt seen and examined  tolerating peg feeds    MEDICATIONS  (STANDING):  apixaban 5 milliGRAM(s) Oral every 12 hours  artificial  tears Solution 1 Drop(s) Both EYES every 6 hours  atorvastatin 40 milliGRAM(s) Oral at bedtime  cefepime   IVPB 2000 milliGRAM(s) IV Intermittent three times a day  chlorhexidine 0.12% Liquid 15 milliLiter(s) Oral Mucosa two times a day  chlorhexidine 4% Liquid 1 Application(s) Topical <User Schedule>  dextrose 50% Injectable 25 Gram(s) IV Push once  dextrose 50% Injectable 12.5 Gram(s) IV Push once  insulin lispro (ADMELOG) corrective regimen sliding scale   SubCutaneous every 6 hours  insulin NPH human recombinant 10 Unit(s) SubCutaneous every 6 hours  labetalol 100 milliGRAM(s) Oral two times a day  nystatin Powder 1 Application(s) Topical two times a day  sodium chloride 3%  Inhalation 3 milliLiter(s) Inhalation every 6 hours  tobramycin for Nebulization 300 milliGRAM(s) Inhalation every 12 hours  vancomycin    Solution 125 milliGRAM(s) Oral every 6 hours    MEDICATIONS  (PRN):  acetaminophen    Suspension .. 650 milliGRAM(s) Oral every 6 hours PRN Temp greater or equal to 38C (100.4F), Mild Pain (1 - 3)      Allergies    penicillin (Other)    Intolerances        Review of Systems:    General:  No wt loss, fevers, chills, night sweats, fatigue,   Eyes:  Good vision, no reported pain  ENT:  No sore throat, pain, runny nose, dysphagia  CV:  No pain, palpitations, hypo/hypertension  Resp:  No dyspnea, cough, tachypnea, wheezing  GI:  See HPI  :  No pain, bleeding, incontinence, nocturia  Muscle:  No pain, weakness  Neuro:  No weakness, tingling, memory problems  Psych:  No fatigue, insomnia, mood problems, depression  Endocrine:  No polyuria, polydipsia, cold/heat intolerance  Heme:  No petechiae, ecchymosis, easy bruisability  Integumentary:  No rash, edema        Vital Signs Last 24 Hrs  T(C): 37 (03 Dec 2021 08:47), Max: 37.3 (03 Dec 2021 05:39)  T(F): 98.6 (03 Dec 2021 08:47), Max: 99.1 (03 Dec 2021 05:39)  HR: 80 (03 Dec 2021 08:47) (76 - 93)  BP: 146/83 (03 Dec 2021 08:47) (120/68 - 146/83)  BP(mean): --  RR: 18 (03 Dec 2021 08:47) (18 - 18)  SpO2: 97% (03 Dec 2021 08:47) (95% - 100%)    PHYSICAL EXAM:    GENERAL:  Appears stated age, well-groomed, well-nourished, no distress  HEENT:  NC/AT,  conjunctivae anicteric, clear and pink,   NECK: supple, trachea midline  CHEST:  Full & symmetric excursion, no increased effort, breath sounds clear  HEART:  Regular rhythm, no JVD  ABDOMEN:  Soft, non-tender, non-distended, normoactive bowel sounds,  peg c/d/i  EXTREMITIES:  no cyanosis,clubbing or edema  SKIN:  No rash, erythema, or, ecchymoses, no jaundice  NEURO:  Alert, non-focal, no asterixis  PSYCH: Appropriate affect, oriented to place and time  RECTAL: Deferred      LABS:                RADIOLOGY & ADDITIONAL TESTS:

## 2021-12-03 NOTE — PROGRESS NOTE ADULT - ASSESSMENT
60 yo male with PMH of HTN, CAD s/p stent on DAPT, T2DM who complained of headache and subsequently become unresponsive. 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 EVD clamped & removed.    NSCU course also c/b respiratory failure and dysphagia now s/p trach and PEG on 11/19. S/p VPS placement on 11/23. Completed course of cefepime for enterobacter and pseudomonas pneumonia on 11/21. Transferred out of NSCU on 11/25. Began to spike fevers with watery output from rectal tube found to be positive for c. diff, PO vanco started on 11/26.     Persistent fever, increased secretions, sputum colonized by Pseudomonas, Cefepime restarted on 11/28.     Last fever 12/1 at 10 am.

## 2021-12-03 NOTE — PROGRESS NOTE ADULT - ASSESSMENT
62 yo male with HTN admitted 11/4 with severe headache and found to have cerebella bleed.  He is s/p posterior fossa decompression on 11/4 followed by craniectomy and PICA aneurysm resection on 11/11 and placement of VPS.  S/p trach and peg. He has distal DVT's and is off anticoagulation.  He received cefepime 11/4-11/21 for respiratory coverage.  He developed watery diarrhea on 11/26 and was diagnosed with C diff.  Was restarted on cefepime on 11/28 for concerns of pneumonia since had low grade temps.  CXR's remain clear.  Ct of brain showed residual blood.  Pseudomonas in sputum found to be resistant to carbapenems & quinolones.  However, sputum now also isolating strep pneumoniae ? contributing to tracheobronchitis as well    11/28 urine and blood cultures are negative at 48 hours.  His C Diff appears controlled  Suggest:  1. Continue enteral vanco for C diff, day 8/14  2. Will  continue  Cefepime to Giorgio nebs, in order to treat both bacteria from sputum cx , perhaps a 4-5 day course  3. With normal wbc I am not convinced low grade fevers represents uncontrolled infection - anticipate to limit systemic antibiotics to another 48 - 72 hrs total.  4.Supportive care  4. Supportive care, await neuro recovery

## 2021-12-03 NOTE — PROGRESS NOTE ADULT - SUBJECTIVE AND OBJECTIVE BOX
Patient is a 61y old  Male who presents with a chief complaint of ICH (03 Dec 2021 08:09)      SUBJECTIVE / OVERNIGHT EVENTS: Pt remains afebrile. Eyes open but not following commands.     MEDICATIONS  (STANDING):  apixaban 5 milliGRAM(s) Oral every 12 hours  artificial  tears Solution 1 Drop(s) Both EYES every 6 hours  atorvastatin 40 milliGRAM(s) Oral at bedtime  cefepime   IVPB 2000 milliGRAM(s) IV Intermittent three times a day  chlorhexidine 0.12% Liquid 15 milliLiter(s) Oral Mucosa two times a day  chlorhexidine 4% Liquid 1 Application(s) Topical <User Schedule>  dextrose 50% Injectable 25 Gram(s) IV Push once  dextrose 50% Injectable 12.5 Gram(s) IV Push once  insulin lispro (ADMELOG) corrective regimen sliding scale   SubCutaneous every 6 hours  insulin NPH human recombinant 10 Unit(s) SubCutaneous every 6 hours  labetalol 100 milliGRAM(s) Oral two times a day  nystatin Powder 1 Application(s) Topical two times a day  sodium chloride 3%  Inhalation 3 milliLiter(s) Inhalation every 6 hours  tobramycin for Nebulization 300 milliGRAM(s) Inhalation every 12 hours  vancomycin    Solution 125 milliGRAM(s) Oral every 6 hours    MEDICATIONS  (PRN):  acetaminophen    Suspension .. 650 milliGRAM(s) Oral every 6 hours PRN Temp greater or equal to 38C (100.4F), Mild Pain (1 - 3)      CAPILLARY BLOOD GLUCOSE      POCT Blood Glucose.: 174 mg/dL (03 Dec 2021 12:20)  POCT Blood Glucose.: 171 mg/dL (03 Dec 2021 05:13)  POCT Blood Glucose.: 210 mg/dL (02 Dec 2021 23:17)  POCT Blood Glucose.: 140 mg/dL (02 Dec 2021 16:50)    I&O's Summary    02 Dec 2021 07:01  -  03 Dec 2021 07:00  --------------------------------------------------------  IN: 0 mL / OUT: 1450 mL / NET: -1450 mL    03 Dec 2021 07:01  -  03 Dec 2021 15:00  --------------------------------------------------------  IN: 0 mL / OUT: 800 mL / NET: -800 mL        PHYSICAL EXAM:  T(C): 36.6 (12-03-21 @ 12:49), Max: 37.3 (12-03-21 @ 05:39)  HR: 84 (12-03-21 @ 12:49) (76 - 84)  BP: 120/78 (12-03-21 @ 12:49) (120/68 - 146/83)  RR: 18 (12-03-21 @ 12:49) (18 - 18)  SpO2: 94% (12-03-21 @ 12:49) (94% - 98%)  CONSTITUTIONAL: Awake, non-verbal  EYES: PERRLA; conjunctiva and sclera clear  ENMT: Moist oral mucosa,   NECK: Supple, no palpable masses; Trach in place  RESPIRATORY: Normal respiratory effort; coarse breath sounds bilaterally  CARDIOVASCULAR: Regular rate and rhythm, normal S1 and S2, no murmur/rub/gallop; No lower extremity edema; Peripheral pulses are 2+ bilaterally  ABDOMEN: Nontender to palpation, normoactive bowel sounds, no rebound/guarding; PEG in place  MUSCULOSKELETAL: No clubbing or cyanosis of digits; no joint swelling or tenderness to palpation  PSYCH: Non-verbal  NEUROLOGY: Not following commands  SKIN: No rashes; no palpable lesions    LABS:                        8.8    7.28  )-----------( 427      ( 03 Dec 2021 10:27 )             28.3     12-03    133<L>  |  95<L>  |  32<H>  ----------------------------<  164<H>  4.9   |  29  |  0.93    Ca    8.9      03 Dec 2021 10:27        RADIOLOGY & ADDITIONAL TESTS:    Imaging Personally Reviewed:    Consultant(s) Notes Reviewed:      Care Discussed with Consultants/Other Providers: Nsx

## 2021-12-03 NOTE — PROGRESS NOTE ADULT - PROBLEM SELECTOR PLAN 3
On inhaled Tobramycin and iv Cefepime for likely tracheobronchitis. Sputum growing Pseudomonas and Strep Pneumoniae. Monitor. On inhaled Tobramycin and iv Cefepime for likely tracheobronchitis. Sputum growing Pseudomonas and Strep Pneumoniae. Monitor.    Hyponatremia- 133, change Cefepime solution to NS. Monitor.

## 2021-12-03 NOTE — PROGRESS NOTE ADULT - PROBLEM SELECTOR PLAN 9
HbA1c 6.5%  - Continue NPH 10 units q6h  - monitor FS q6h HbA1c 6.5%  - FS elevated, Increase NPH to 12 units q6h  - monitor FS q6h

## 2021-12-03 NOTE — PROGRESS NOTE ADULT - ASSESSMENT
61M hx HTN, DM, cardiac stent on ASA. 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 on 11/5/2021, s/p cerebral angiogram showing left PICA fistula on 11/9/2021, s/p crani for PICA aneurysm resection on 11/10/2021, s/p trach/PEG on 11/19/21, s/p insertion of right parietal ventriculoperitoneal shunt (Certas @ 4) on 11/23/2021      PLAN:  Neuro:   - q4 hour neuro checks  - tylenol for pain control and fevers  - out of bed with assistance  - pt/ot - subacute rehab upon discharge  - VPS Certas @4    Respiratory:   - on trach collar  - suction prn- moderate amount of secretions  - continue 3% sodium chloride inhalation  - ID following for Tracheobronchitis.      CV:  -keep sbp 100-140  -labetalol for hypertension  -atorvastatin for hyperlipidemia     Endocrine:   - NPH 10U and JOSE MARTIN for glucose control  - keep fingersticks 100-180    DVT ppx:   - Eliquis 5mg BID started yesterday, cleared by Dr. Page.   - persistent bilateral below knee dvt, awaiting repeat serial LE dopplers    ID:   - remains afebrile  - Continue PO vanco for c. diff, day 8 of 14.   - Cont Giorgio Nebs, tracheobronchitis   - Cefepime started for 48-72Hours per ID, Pseudomonas tracheobronchitis  - ID following    GI:    - tolerating tube feeds, Glucerna.     Renal:  - Patient with chronic urinary retention, Barney inserted 11/28, negative UA at the time.  Rectal tube removed yesterday, Will consider TOV when CDiff improves.     PT/OT:   - [facility] when cleared and accepted.     [facility] # [id number]

## 2021-12-03 NOTE — PROGRESS NOTE ADULT - SUBJECTIVE AND OBJECTIVE BOX
SUBJECTIVE:  Patient seen and examined, no acute changes in exam, on trach collar.  Recently started on Eliquis 5BID yesterday for DVTs.    OVERNIGHT EVENTS: Rectal tube removed yesterday, Eliquis Started for DVTs.     Vital Signs Last 24 Hrs  T(C): 37 (03 Dec 2021 08:47), Max: 37.3 (03 Dec 2021 05:39)  T(F): 98.6 (03 Dec 2021 08:47), Max: 99.1 (03 Dec 2021 05:39)  HR: 80 (03 Dec 2021 08:47) (76 - 93)  BP: 146/83 (03 Dec 2021 08:47) (120/68 - 146/83)  BP(mean): --  RR: 18 (03 Dec 2021 08:47) (18 - 18)  SpO2: 97% (03 Dec 2021 08:47) (95% - 100%)    PHYSICAL EXAM:    General: No Acute Distress     Neurological: ON TRACH COLLAR, Alert OE, non verbal, uppers wds, lowers trace movement, follow commands wiggles toes.   Pulmonary: +Secretions from trach, Clear to Auscultation, No Rales, No Rhonchi, No Wheezes   Cardiovascular:  Regular Rate and Rhythm   Gastrointestinal: Soft, Nontender, Nondistended   Incision:     LABS:        12-02 @ 07:01  -  12-03 @ 07:00  --------------------------------------------------------  IN: 0 mL / OUT: 1450 mL / NET: -1450 mL      DRAINS:     MEDICATIONS:  Antibiotics:  cefepime   IVPB 2000 milliGRAM(s) IV Intermittent three times a day  tobramycin for Nebulization 300 milliGRAM(s) Inhalation every 12 hours  vancomycin    Solution 125 milliGRAM(s) Oral every 6 hours    Neuro:  acetaminophen    Suspension .. 650 milliGRAM(s) Oral every 6 hours PRN Temp greater or equal to 38C (100.4F), Mild Pain (1 - 3)    Cardiac:  labetalol 100 milliGRAM(s) Oral two times a day    Pulm:  sodium chloride 3%  Inhalation 3 milliLiter(s) Inhalation every 6 hours    GI/:    Other:   apixaban 5 milliGRAM(s) Oral every 12 hours  artificial  tears Solution 1 Drop(s) Both EYES every 6 hours  atorvastatin 40 milliGRAM(s) Oral at bedtime  chlorhexidine 0.12% Liquid 15 milliLiter(s) Oral Mucosa two times a day  chlorhexidine 4% Liquid 1 Application(s) Topical <User Schedule>  dextrose 50% Injectable 25 Gram(s) IV Push once  dextrose 50% Injectable 12.5 Gram(s) IV Push once  insulin lispro (ADMELOG) corrective regimen sliding scale   SubCutaneous every 6 hours  insulin NPH human recombinant 10 Unit(s) SubCutaneous every 6 hours  nystatin Powder 1 Application(s) Topical two times a day    DIET: [] Regular [] CCD [] Renal [] Puree [] Dysphagia [] Tube Feeds:     IMAGING:

## 2021-12-03 NOTE — PROGRESS NOTE ADULT - PROBLEM SELECTOR PLAN 6
right soleal vein DVT and persistent left posterior tibial, peroneal, gastrocnemius and soleal vein DVT without proximal propagation on last duplex on 11/24  - c/w serial duplex to monitor for propagation  - started on Eliquis 12/2

## 2021-12-03 NOTE — PROGRESS NOTE ADULT - ASSESSMENT
61 year old man with respiratory failure d/t ICH    1. ICH  -per neurosurgery, s/p EVD  -for  shunt    2. Respiratory failure  -for tracheostomy, will require long term enteral nutrition  -s/p PEG, tolerating feeds  - aspiration precautions     3. Enterobacter PNA  -s/p course of antibiotics     4. afib with RVR    5. Anemia, no overt GI bleed. EGD unremarkable.  -trend CBC    6. Cdiff infection on PO vanco  -no BM documented today    [facility]  Gastroenterology and Hepatology  [address] [place], [place]  Office: [phone]  Cell: [phone]

## 2021-12-04 PROCEDURE — 99233 SBSQ HOSP IP/OBS HIGH 50: CPT

## 2021-12-04 PROCEDURE — 99232 SBSQ HOSP IP/OBS MODERATE 35: CPT

## 2021-12-04 RX ORDER — PANTOPRAZOLE SODIUM 20 MG/1
40 TABLET, DELAYED RELEASE ORAL DAILY
Refills: 0 | Status: DISCONTINUED | OUTPATIENT
Start: 2021-12-04 | End: 2021-12-10

## 2021-12-04 RX ADMIN — NYSTATIN CREAM 1 APPLICATION(S): 100000 CREAM TOPICAL at 14:01

## 2021-12-04 RX ADMIN — HUMAN INSULIN 12 UNIT(S): 100 INJECTION, SUSPENSION SUBCUTANEOUS at 23:24

## 2021-12-04 RX ADMIN — Medication 650 MILLIGRAM(S): at 06:32

## 2021-12-04 RX ADMIN — Medication 650 MILLIGRAM(S): at 23:54

## 2021-12-04 RX ADMIN — Medication 125 MILLIGRAM(S): at 23:22

## 2021-12-04 RX ADMIN — Medication 100 MILLIGRAM(S): at 17:27

## 2021-12-04 RX ADMIN — HUMAN INSULIN 12 UNIT(S): 100 INJECTION, SUSPENSION SUBCUTANEOUS at 13:53

## 2021-12-04 RX ADMIN — Medication 1 DROP(S): at 06:31

## 2021-12-04 RX ADMIN — Medication 4: at 23:23

## 2021-12-04 RX ADMIN — CHLORHEXIDINE GLUCONATE 15 MILLILITER(S): 213 SOLUTION TOPICAL at 13:33

## 2021-12-04 RX ADMIN — PANTOPRAZOLE SODIUM 40 MILLIGRAM(S): 20 TABLET, DELAYED RELEASE ORAL at 23:22

## 2021-12-04 RX ADMIN — Medication 1 DROP(S): at 17:23

## 2021-12-04 RX ADMIN — Medication 650 MILLIGRAM(S): at 23:24

## 2021-12-04 RX ADMIN — APIXABAN 5 MILLIGRAM(S): 2.5 TABLET, FILM COATED ORAL at 06:29

## 2021-12-04 RX ADMIN — Medication 2: at 06:32

## 2021-12-04 RX ADMIN — Medication 1 DROP(S): at 23:23

## 2021-12-04 RX ADMIN — Medication 125 MILLIGRAM(S): at 06:32

## 2021-12-04 RX ADMIN — CEFEPIME 100 MILLIGRAM(S): 1 INJECTION, POWDER, FOR SOLUTION INTRAMUSCULAR; INTRAVENOUS at 12:06

## 2021-12-04 RX ADMIN — SODIUM CHLORIDE 3 MILLILITER(S): 9 INJECTION INTRAMUSCULAR; INTRAVENOUS; SUBCUTANEOUS at 06:33

## 2021-12-04 RX ADMIN — CHLORHEXIDINE GLUCONATE 15 MILLILITER(S): 213 SOLUTION TOPICAL at 17:27

## 2021-12-04 RX ADMIN — HUMAN INSULIN 12 UNIT(S): 100 INJECTION, SUSPENSION SUBCUTANEOUS at 06:31

## 2021-12-04 RX ADMIN — Medication 1 DROP(S): at 13:33

## 2021-12-04 RX ADMIN — ATORVASTATIN CALCIUM 40 MILLIGRAM(S): 80 TABLET, FILM COATED ORAL at 23:22

## 2021-12-04 RX ADMIN — Medication 125 MILLIGRAM(S): at 13:35

## 2021-12-04 RX ADMIN — CEFEPIME 100 MILLIGRAM(S): 1 INJECTION, POWDER, FOR SOLUTION INTRAMUSCULAR; INTRAVENOUS at 06:27

## 2021-12-04 RX ADMIN — Medication 300 MILLIGRAM(S): at 18:20

## 2021-12-04 RX ADMIN — CEFEPIME 100 MILLIGRAM(S): 1 INJECTION, POWDER, FOR SOLUTION INTRAMUSCULAR; INTRAVENOUS at 23:22

## 2021-12-04 RX ADMIN — SODIUM CHLORIDE 3 MILLILITER(S): 9 INJECTION INTRAMUSCULAR; INTRAVENOUS; SUBCUTANEOUS at 23:24

## 2021-12-04 RX ADMIN — NYSTATIN CREAM 1 APPLICATION(S): 100000 CREAM TOPICAL at 18:19

## 2021-12-04 RX ADMIN — Medication 650 MILLIGRAM(S): at 00:12

## 2021-12-04 RX ADMIN — Medication 650 MILLIGRAM(S): at 07:02

## 2021-12-04 RX ADMIN — Medication 300 MILLIGRAM(S): at 06:33

## 2021-12-04 RX ADMIN — SODIUM CHLORIDE 3 MILLILITER(S): 9 INJECTION INTRAMUSCULAR; INTRAVENOUS; SUBCUTANEOUS at 18:19

## 2021-12-04 RX ADMIN — SODIUM CHLORIDE 3 MILLILITER(S): 9 INJECTION INTRAMUSCULAR; INTRAVENOUS; SUBCUTANEOUS at 14:00

## 2021-12-04 RX ADMIN — HUMAN INSULIN 12 UNIT(S): 100 INJECTION, SUSPENSION SUBCUTANEOUS at 17:28

## 2021-12-04 RX ADMIN — Medication 125 MILLIGRAM(S): at 17:24

## 2021-12-04 RX ADMIN — APIXABAN 5 MILLIGRAM(S): 2.5 TABLET, FILM COATED ORAL at 17:27

## 2021-12-04 RX ADMIN — Medication 100 MILLIGRAM(S): at 06:31

## 2021-12-04 NOTE — PROGRESS NOTE ADULT - SUBJECTIVE AND OBJECTIVE BOX
Patient is a 61y old  Male who presents with a chief complaint of ICH (04 Dec 2021 06:27)      SUBJECTIVE / OVERNIGHT EVENTS:    Tele reviewed:       ADDITIONAL REVIEW OF SYSTEMS: unable to obtain     MEDICATIONS  (STANDING):  apixaban 5 milliGRAM(s) Oral every 12 hours  artificial  tears Solution 1 Drop(s) Both EYES every 6 hours  atorvastatin 40 milliGRAM(s) Oral at bedtime  cefepime   IVPB 2000 milliGRAM(s) IV Intermittent every 8 hours  chlorhexidine 0.12% Liquid 15 milliLiter(s) Oral Mucosa two times a day  chlorhexidine 4% Liquid 1 Application(s) Topical <User Schedule>  dextrose 50% Injectable 25 Gram(s) IV Push once  dextrose 50% Injectable 12.5 Gram(s) IV Push once  insulin lispro (ADMELOG) corrective regimen sliding scale   SubCutaneous every 6 hours  insulin NPH human recombinant 12 Unit(s) SubCutaneous every 6 hours  labetalol 100 milliGRAM(s) Oral two times a day  nystatin Powder 1 Application(s) Topical two times a day  sodium chloride 3%  Inhalation 3 milliLiter(s) Inhalation every 6 hours  tobramycin for Nebulization 300 milliGRAM(s) Inhalation every 12 hours  vancomycin    Solution 125 milliGRAM(s) Oral every 6 hours    MEDICATIONS  (PRN):  acetaminophen    Suspension .. 650 milliGRAM(s) Oral every 6 hours PRN Temp greater or equal to 38C (100.4F), Mild Pain (1 - 3)      CAPILLARY BLOOD GLUCOSE      POCT Blood Glucose.: 186 mg/dL (04 Dec 2021 06:30)  POCT Blood Glucose.: 206 mg/dL (03 Dec 2021 23:34)  POCT Blood Glucose.: 185 mg/dL (03 Dec 2021 21:17)  POCT Blood Glucose.: 155 mg/dL (03 Dec 2021 16:33)  POCT Blood Glucose.: 174 mg/dL (03 Dec 2021 12:20)    I&O's Summary    03 Dec 2021 07:01  -  04 Dec 2021 07:00  --------------------------------------------------------  IN: 0 mL / OUT: 2150 mL / NET: -2150 mL        PHYSICAL EXAM:  Vital Signs Last 24 Hrs  T(C): 36.6 (04 Dec 2021 08:38), Max: 37.3 (03 Dec 2021 15:29)  T(F): 97.9 (04 Dec 2021 08:38), Max: 99.1 (03 Dec 2021 15:29)  HR: 84 (04 Dec 2021 08:38) (84 - 91)  BP: 136/74 (04 Dec 2021 08:38) (109/67 - 136/76)  BP(mean): --  RR: 18 (04 Dec 2021 08:38) (18 - 18)  SpO2: 97% (04 Dec 2021 08:38) (94% - 97%)    PHYSICAL EXAM:        LABS:                        8.8    7.28  )-----------( 427      ( 03 Dec 2021 10:27 )             28.3     12-03    133<L>  |  95<L>  |  32<H>  ----------------------------<  164<H>  4.9   |  29  |  0.93    Ca    8.9      03 Dec 2021 10:27                  RADIOLOGY & ADDITIONAL TESTS:    Imaging Personally Reviewed:    Electrocardiogram Personally Reviewed:    COORDINATION OF CARE:  Care Discussed with Consultants/Other Providers [Y/N]:  Prior or Outpatient Records Reviewed [Y/N]:     Patient is a 61y old  Male who presents with a chief complaint of ICH (04 Dec 2021 06:27)      SUBJECTIVE / OVERNIGHT EVENTS:   TMax: 99.1 F  HR: 84 (84 - 91)  BP: 136/74  (109/67 - 136/76)  RR: 18 (18 - 18)  SpO2: 97% (94% - 97%)      ADDITIONAL REVIEW OF SYSTEMS: unable to obtain     MEDICATIONS  (STANDING):  apixaban 5 milliGRAM(s) Oral every 12 hours  artificial  tears Solution 1 Drop(s) Both EYES every 6 hours  atorvastatin 40 milliGRAM(s) Oral at bedtime  cefepime   IVPB 2000 milliGRAM(s) IV Intermittent every 8 hours  chlorhexidine 0.12% Liquid 15 milliLiter(s) Oral Mucosa two times a day  chlorhexidine 4% Liquid 1 Application(s) Topical <User Schedule>  dextrose 50% Injectable 25 Gram(s) IV Push once  dextrose 50% Injectable 12.5 Gram(s) IV Push once  insulin lispro (ADMELOG) corrective regimen sliding scale   SubCutaneous every 6 hours  insulin NPH human recombinant 12 Unit(s) SubCutaneous every 6 hours  labetalol 100 milliGRAM(s) Oral two times a day  nystatin Powder 1 Application(s) Topical two times a day  sodium chloride 3%  Inhalation 3 milliLiter(s) Inhalation every 6 hours  tobramycin for Nebulization 300 milliGRAM(s) Inhalation every 12 hours  vancomycin    Solution 125 milliGRAM(s) Oral every 6 hours    MEDICATIONS  (PRN):  acetaminophen    Suspension .. 650 milliGRAM(s) Oral every 6 hours PRN Temp greater or equal to 38C (100.4F), Mild Pain (1 - 3)      CAPILLARY BLOOD GLUCOSE      POCT Blood Glucose.: 186 mg/dL (04 Dec 2021 06:30)  POCT Blood Glucose.: 206 mg/dL (03 Dec 2021 23:34)  POCT Blood Glucose.: 185 mg/dL (03 Dec 2021 21:17)  POCT Blood Glucose.: 155 mg/dL (03 Dec 2021 16:33)  POCT Blood Glucose.: 174 mg/dL (03 Dec 2021 12:20)    I&O's Summary    03 Dec 2021 07:01  -  04 Dec 2021 07:00  --------------------------------------------------------  IN: 0 mL / OUT: 2150 mL / NET: -2150 mL        PHYSICAL EXAM:  Vital Signs Last 24 Hrs  T(C): 36.6 (04 Dec 2021 08:38), Max: 37.3 (03 Dec 2021 15:29)  T(F): 97.9 (04 Dec 2021 08:38), Max: 99.1 (03 Dec 2021 15:29)  HR: 84 (04 Dec 2021 08:38) (84 - 91)  BP: 136/74 (04 Dec 2021 08:38) (109/67 - 136/76)  BP(mean): --  RR: 18 (04 Dec 2021 08:38) (18 - 18)  SpO2: 97% (04 Dec 2021 08:38) (94% - 97%)    PHYSICAL EXAM:  CONSTITUTIONAL: Awake, non-verbal  EYES: PERRLA; conjunctiva and sclera clear  ENMT: Moist oral mucosa,   NECK: Supple, no palpable masses; Trach in place  RESPIRATORY: Normal respiratory effort; coarse breath sounds bilaterally  CARDIOVASCULAR: Regular rate and rhythm, normal S1 and S2, no murmur/rub/gallop; No lower extremity edema; Peripheral pulses are 2+ bilaterally  ABDOMEN: Nontender to palpation, normoactive bowel sounds, no rebound/guarding; PEG in place  MUSCULOSKELETAL: No clubbing or cyanosis of digits; no joint swelling or tenderness to palpation  PSYCH: Non-verbal  NEUROLOGY: Not following commands      LABS:                        8.8    7.28  )-----------( 427      ( 03 Dec 2021 10:27 )             28.3     12-03    133<L>  |  95<L>  |  32<H>  ----------------------------<  164<H>  4.9   |  29  |  0.93    Ca    8.9      03 Dec 2021 10:27                  RADIOLOGY & ADDITIONAL TESTS:    Imaging Personally Reviewed:    Electrocardiogram Personally Reviewed:    COORDINATION OF CARE:  Care Discussed with Consultants/Other Providers [Y/N]:  Prior or Outpatient Records Reviewed [Y/N]:     Patient is a 61y old  Male who presents with a chief complaint of ICH (04 Dec 2021 06:27)      SUBJECTIVE / OVERNIGHT EVENTS:   TMax: 99.1 F  HR: 84 (84 - 91)  BP: 136/74  (109/67 - 136/76)  RR: 18 (18 - 18)  SpO2: 97% (94% - 97%)      ADDITIONAL REVIEW OF SYSTEMS: unable to obtain     MEDICATIONS  (STANDING):  apixaban 5 milliGRAM(s) Oral every 12 hours  artificial  tears Solution 1 Drop(s) Both EYES every 6 hours  atorvastatin 40 milliGRAM(s) Oral at bedtime  cefepime   IVPB 2000 milliGRAM(s) IV Intermittent every 8 hours  chlorhexidine 0.12% Liquid 15 milliLiter(s) Oral Mucosa two times a day  chlorhexidine 4% Liquid 1 Application(s) Topical <User Schedule>  dextrose 50% Injectable 25 Gram(s) IV Push once  dextrose 50% Injectable 12.5 Gram(s) IV Push once  insulin lispro (ADMELOG) corrective regimen sliding scale   SubCutaneous every 6 hours  insulin NPH human recombinant 12 Unit(s) SubCutaneous every 6 hours  labetalol 100 milliGRAM(s) Oral two times a day  nystatin Powder 1 Application(s) Topical two times a day  sodium chloride 3%  Inhalation 3 milliLiter(s) Inhalation every 6 hours  tobramycin for Nebulization 300 milliGRAM(s) Inhalation every 12 hours  vancomycin    Solution 125 milliGRAM(s) Oral every 6 hours    MEDICATIONS  (PRN):  acetaminophen    Suspension .. 650 milliGRAM(s) Oral every 6 hours PRN Temp greater or equal to 38C (100.4F), Mild Pain (1 - 3)      CAPILLARY BLOOD GLUCOSE      POCT Blood Glucose.: 186 mg/dL (04 Dec 2021 06:30)  POCT Blood Glucose.: 206 mg/dL (03 Dec 2021 23:34)  POCT Blood Glucose.: 185 mg/dL (03 Dec 2021 21:17)  POCT Blood Glucose.: 155 mg/dL (03 Dec 2021 16:33)  POCT Blood Glucose.: 174 mg/dL (03 Dec 2021 12:20)    I&O's Summary    03 Dec 2021 07:01  -  04 Dec 2021 07:00  --------------------------------------------------------  IN: 0 mL / OUT: 2150 mL / NET: -2150 mL        PHYSICAL EXAM:  Vital Signs Last 24 Hrs  T(C): 36.6 (04 Dec 2021 08:38), Max: 37.3 (03 Dec 2021 15:29)  T(F): 97.9 (04 Dec 2021 08:38), Max: 99.1 (03 Dec 2021 15:29)  HR: 84 (04 Dec 2021 08:38) (84 - 91)  BP: 136/74 (04 Dec 2021 08:38) (109/67 - 136/76)  BP(mean): --  RR: 18 (04 Dec 2021 08:38) (18 - 18)  SpO2: 97% (04 Dec 2021 08:38) (94% - 97%)    PHYSICAL EXAM:  CONSTITUTIONAL: Awake, non-verbal  EYES: PERRLA; conjunctiva and sclera clear  ENMT: Moist oral mucosa,   NECK: Supple, no palpable masses; Trach in place  RESPIRATORY: Normal respiratory effort; pos air entry B/L   CARDIOVASCULAR: Regular rate and rhythm, normal S1 and S2, no murmur/rub/gallop; No lower extremity edema; Peripheral pulses are 2+ bilaterally  ABDOMEN: Nontender to palpation, normoactive bowel sounds, no rebound/guarding; PEG in place  MUSCULOSKELETAL: No clubbing or cyanosis of digits; no joint swelling or tenderness to palpation  PSYCH: Non-verbal  NEUROLOGY: Not following commands      LABS:                        8.8    7.28  )-----------( 427      ( 03 Dec 2021 10:27 )             28.3     12-03    133<L>  |  95<L>  |  32<H>  ----------------------------<  164<H>  4.9   |  29  |  0.93    Ca    8.9      03 Dec 2021 10:27                  RADIOLOGY & ADDITIONAL TESTS:    Imaging Personally Reviewed:    Electrocardiogram Personally Reviewed:    COORDINATION OF CARE:  Care Discussed with Consultants/Other Providers [Y/N]:  Prior or Outpatient Records Reviewed [Y/N]:

## 2021-12-04 NOTE — PROGRESS NOTE ADULT - SUBJECTIVE AND OBJECTIVE BOX
CC: f/u for C diff and purulent tracheobronchitis    Patient reports: he is non verbal, still has foul smelling trach secretions and loose stool    REVIEW OF SYSTEMS:  All other review of systems negative (Comprehensive ROS)    Antimicrobials Day #  :  cefepime   IVPB 2000 milliGRAM(s) IV Intermittent every 8 hours day 3  tobramycin for Nebulization 300 milliGRAM(s) Inhalation every 12 hours day 4  vancomycin    Solution 125 milliGRAM(s) Oral every 6 hours day 9    Other Medications Reviewed  MEDICATIONS  (STANDING):  apixaban 5 milliGRAM(s) Oral every 12 hours  artificial  tears Solution 1 Drop(s) Both EYES every 6 hours  atorvastatin 40 milliGRAM(s) Oral at bedtime  cefepime   IVPB 2000 milliGRAM(s) IV Intermittent every 8 hours  chlorhexidine 0.12% Liquid 15 milliLiter(s) Oral Mucosa two times a day  chlorhexidine 4% Liquid 1 Application(s) Topical <User Schedule>  dextrose 50% Injectable 25 Gram(s) IV Push once  dextrose 50% Injectable 12.5 Gram(s) IV Push once  insulin lispro (ADMELOG) corrective regimen sliding scale   SubCutaneous every 6 hours  insulin NPH human recombinant 12 Unit(s) SubCutaneous every 6 hours  labetalol 100 milliGRAM(s) Oral two times a day  nystatin Powder 1 Application(s) Topical two times a day  sodium chloride 3%  Inhalation 3 milliLiter(s) Inhalation every 6 hours  tobramycin for Nebulization 300 milliGRAM(s) Inhalation every 12 hours  vancomycin    Solution 125 milliGRAM(s) Oral every 6 hours    T(F): 98.6 (12-04-21 @ 04:57), Max: 99.1 (12-03-21 @ 15:29)  HR: 91 (12-04-21 @ 04:57)  BP: 128/76 (12-04-21 @ 04:57)  RR: 18 (12-04-21 @ 04:57)  SpO2: 96% (12-04-21 @ 04:57)  Wt(kg): --    PHYSICAL EXAM:  General: awake, marginally interactive  Eyes:  anicteric, no conjunctival injection, no discharge  Oropharynx: no lesions or injection 	  Neck: supple, trach  Lungs: scattered ronchi  Heart: regular rate and rhythm; no murmur, rubs or gallops  Abdomen: soft, nondistended, nontender, without mass or organomegaly, peg  Skin: no lesions  Extremities: no clubbing, cyanosis, or edema  Neurologic: functional quadriplegia    LAB RESULTS:                        8.8    7.28  )-----------( 427      ( 03 Dec 2021 10:27 )             28.3     12-03    133<L>  |  95<L>  |  32<H>  ----------------------------<  164<H>  4.9   |  29  |  0.93    Ca    8.9      03 Dec 2021 10:27          MICROBIOLOGY:  RECENT CULTURES:      RADIOLOGY REVIEWED:  < from: [facility] Duplex Lower Ext Vein Scan, Beckie (12.03.21 @ 14:00) >  IMPRESSION:  Persistent bilateral calf DVT.    No evidence of interval clot propagation.    < end of copied text >

## 2021-12-04 NOTE — PROGRESS NOTE ADULT - PROBLEM SELECTOR PLAN 3
On inhaled Tobramycin and iv Cefepime for likely tracheobronchitis. Sputum growing Pseudomonas and Strep Pneumoniae. Monitor.  ID is on the case   Hyponatremia- 133 on 12/3 ---> BMP in AM

## 2021-12-04 NOTE — PROGRESS NOTE ADULT - PROBLEM SELECTOR PLAN 2
Continue PO Vanco. Diarrhea improved. Rectal tube has been removed.  one stool on 12/4 recorded   cont to monitor

## 2021-12-04 NOTE — PROGRESS NOTE ADULT - PROBLEM SELECTOR PLAN 7
s/p stent. was on DAPT per prescription refills   - resume ASA when clear from neurosurgery standpoint  - c/w statin/Labetalol

## 2021-12-04 NOTE — PROGRESS NOTE ADULT - SUBJECTIVE AND OBJECTIVE BOX
INTERVAL HPI/OVERNIGHT EVENTS:  pt seen and examined  tolerating peg feeds    MEDICATIONS  (STANDING):  apixaban 5 milliGRAM(s) Oral every 12 hours  artificial  tears Solution 1 Drop(s) Both EYES every 6 hours  atorvastatin 40 milliGRAM(s) Oral at bedtime  cefepime   IVPB 2000 milliGRAM(s) IV Intermittent three times a day  chlorhexidine 0.12% Liquid 15 milliLiter(s) Oral Mucosa two times a day  chlorhexidine 4% Liquid 1 Application(s) Topical <User Schedule>  dextrose 50% Injectable 25 Gram(s) IV Push once  dextrose 50% Injectable 12.5 Gram(s) IV Push once  insulin lispro (ADMELOG) corrective regimen sliding scale   SubCutaneous every 6 hours  insulin NPH human recombinant 10 Unit(s) SubCutaneous every 6 hours  labetalol 100 milliGRAM(s) Oral two times a day  nystatin Powder 1 Application(s) Topical two times a day  sodium chloride 3%  Inhalation 3 milliLiter(s) Inhalation every 6 hours  tobramycin for Nebulization 300 milliGRAM(s) Inhalation every 12 hours  vancomycin    Solution 125 milliGRAM(s) Oral every 6 hours    MEDICATIONS  (PRN):  acetaminophen    Suspension .. 650 milliGRAM(s) Oral every 6 hours PRN Temp greater or equal to 38C (100.4F), Mild Pain (1 - 3)      Allergies    penicillin (Other)    Intolerances        Review of Systems:    General:  No wt loss, fevers, chills, night sweats, fatigue,   Eyes:  Good vision, no reported pain  ENT:  No sore throat, pain, runny nose, dysphagia  CV:  No pain, palpitations, hypo/hypertension  Resp:  No dyspnea, cough, tachypnea, wheezing  GI:  See HPI  :  No pain, bleeding, incontinence, nocturia  Muscle:  No pain, weakness  Neuro:  No weakness, tingling, memory problems  Psych:  No fatigue, insomnia, mood problems, depression  Endocrine:  No polyuria, polydipsia, cold/heat intolerance  Heme:  No petechiae, ecchymosis, easy bruisability  Integumentary:  No rash, edema        Vital Signs Last 24 Hrs  Vital Signs Last 24 Hrs  T(C): 36.7 (04 Dec 2021 15:41), Max: 37.1 (04 Dec 2021 00:02)  T(F): 98.1 (04 Dec 2021 15:41), Max: 98.8 (04 Dec 2021 11:19)  HR: 92 (04 Dec 2021 15:41) (84 - 92)  BP: 131/80 (04 Dec 2021 15:41) (109/67 - 136/74)  BP(mean): --  RR: 18 (04 Dec 2021 15:41) (18 - 18)  SpO2: 97% (04 Dec 2021 15:41) (96% - 97%)    PHYSICAL EXAM:    GENERAL:  Appears stated age, well-groomed, well-nourished, no distress  HEENT:  NC/AT,  conjunctivae anicteric, clear and pink,   NECK: supple, trachea midline  CHEST:  Full & symmetric excursion, no increased effort, breath sounds clear  HEART:  Regular rhythm, no JVD  ABDOMEN:  Soft, non-tender, non-distended, normoactive bowel sounds,  peg c/d/i  EXTREMITIES:  no cyanosis,clubbing or edema  SKIN:  No rash, erythema, or, ecchymoses, no jaundice  NEURO:  Alert, non-focal, no asterixis  PSYCH: Appropriate affect, oriented to place and time  RECTAL: Deferred      LABS:      CBC Full  -  ( 03 Dec 2021 10:27 )  WBC Count : 7.28 K/uL  RBC Count : 2.93 M/uL  Hemoglobin : 8.8 g/dL  Hematocrit : 28.3 %  Platelet Count - Automated : 427 K/uL  Mean Cell Volume : 96.6 fl  Mean Cell Hemoglobin : 30.0 pg  Mean Cell Hemoglobin Concentration : 31.1 gm/dL  Auto Neutrophil # : x  Auto Lymphocyte # : x  Auto Monocyte # : x  Auto Eosinophil # : x  Auto Basophil # : x  Auto Neutrophil % : x  Auto Lymphocyte % : x  Auto Monocyte % : x  Auto Eosinophil % : x  Auto Basophil % : x            RADIOLOGY & ADDITIONAL TESTS:

## 2021-12-04 NOTE — PROGRESS NOTE ADULT - NSPROGADDITIONALINFOA_GEN_ALL_CORE
Janice Sylvester   HOspitalist ( covering Neurosurgery services)   [phone] ( Beeper )   [phone] ( spectra)

## 2021-12-04 NOTE — PROGRESS NOTE ADULT - SUBJECTIVE AND OBJECTIVE BOX
Vascular Cardiology  Progress note  EMAIL [email or web address]     OFFICE [phone]    INTERVAL HISTORY:  -No acute events overnight.    Allergies  penicillin (Other)    MEDICATIONS  (STANDING):  apixaban 5 milliGRAM(s) Oral every 12 hours  artificial  tears Solution 1 Drop(s) Both EYES every 6 hours  atorvastatin 40 milliGRAM(s) Oral at bedtime  cefepime   IVPB 2000 milliGRAM(s) IV Intermittent every 8 hours  chlorhexidine 0.12% Liquid 15 milliLiter(s) Oral Mucosa two times a day  chlorhexidine 4% Liquid 1 Application(s) Topical <User Schedule>  dextrose 50% Injectable 25 Gram(s) IV Push once  dextrose 50% Injectable 12.5 Gram(s) IV Push once  insulin lispro (ADMELOG) corrective regimen sliding scale   SubCutaneous every 6 hours  insulin NPH human recombinant 12 Unit(s) SubCutaneous every 6 hours  labetalol 100 milliGRAM(s) Oral two times a day  nystatin Powder 1 Application(s) Topical two times a day  sodium chloride 3%  Inhalation 3 milliLiter(s) Inhalation every 6 hours  tobramycin for Nebulization 300 milliGRAM(s) Inhalation every 12 hours  vancomycin    Solution 125 milliGRAM(s) Oral every 6 hours    PAST MEDICAL & SURGICAL HISTORY:  HTN (hypertension)  Diabetes mellitus    FAMILY HISTORY:    SOCIAL HISTORY:  unchanged    REVIEW OF SYSTEMS:      [x ] Unable to obtain   ICU Vital Signs Last 24 Hrs  T(C): 37 (04 Dec 2021 04:57), Max: 37.3 (03 Dec 2021 15:29)  T(F): 98.6 (04 Dec 2021 04:57), Max: 99.1 (03 Dec 2021 15:29)  HR: 91 (04 Dec 2021 04:57) (84 - 91)  BP: 128/76 (04 Dec 2021 04:57) (109/67 - 136/76)  BP(mean): --  ABP: --  ABP(mean): --  RR: 18 (04 Dec 2021 04:57) (18 - 18)  SpO2: 96% (04 Dec 2021 04:57) (94% - 97%)      Appearance: Frail appearing, nonverbal, with trach and PEG  HEENT:   Normal oral mucosa, PERRL, EOMI	  Lymphatic: No lymphadenopathy  Cardiovascular:  S1, S2, RRR, no RMG  Respiratory:  CTA b/l  Psychiatry:  Nonverbal; nods head upon command  Gastrointestinal:  Soft, Non-tender, + BS	  Skin: No rashes, No ecchymoses, No cyanosis	  Extremities:  B/l LE with no edema, asymmetry of girth, skin changes; extremities are warm with no ulcers or wounds, no phlegmasia    Vascular Pulse Exam:  Right DP: [x]palpable []non-palpable []audible      Left DP :   [x]palpable []non-palpable []audible  Right PT: [x]palpable [] non-palpable []audible   Left PT:  [x] palpable [] non-palpable []audible                           8.8    7.28  )-----------( 427      ( 03 Dec 2021 10:27 )             28.3   12-03    133<L>  |  95<L>  |  32<H>  ----------------------------<  164<H>  4.9   |  29  |  0.93    Ca    8.9      03 Dec 2021 10:27      Assessment:  1. 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 EVD clamped & removed.  2. C. difficile diarrhea  --PCR positive. likely 2/2 to Abx use  3. Gram-negative pneumonia.   --s/p 7 day course of cefepime on 11/21; was started on levofloxacin for 11/24 sputum cx with pseudomonas  4. Respiratory failure.   --s/p trach. on trach collar  5. B/l below knee DVTs  --Right soleal vein DVT and persistent left posterior tibial, peroneal, gastrocnemius and soleal vein DVT without proximal propagation on last duplex on 11/24    8. CAD   --s/p stent and on DAPT  --ASA to be resumed when cleared by NSG; on statin    Plan:  1. Continue Eliquis 5mg PO BID for b/l below the knee DVTs  2. Watch hemoglobin  3.  Nsx followup  4.  [facility] discussions    Thank you    Alonzo Villar   Vascular Cardiology Service    Please call with any questions:   Service Line: [phone]  Office [phone]  email:  [email or web address]

## 2021-12-04 NOTE — PROGRESS NOTE ADULT - PROBLEM SELECTOR PLAN 6
right soleal vein DVT and persistent left posterior tibial, peroneal, gastrocnemius and soleal vein DVT without proximal propagation on last duplex on 11/24  - c/w serial duplex to monitor for propagation  - started on Eliquis 12/2--> monitor for any bleed   Vas team is on the case

## 2021-12-04 NOTE — PROGRESS NOTE ADULT - ASSESSMENT
61M hx HTN, DM, cardiac stent on ASA. 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 on 11/5/2021, s/p cerebral angiogram showing left PICA fistula on 11/9/2021, s/p crani for PICA aneurysm resection on 11/10/2021, s/p trach/PEG on 11/19/21, s/p insertion of right parietal ventriculoperitoneal shunt (Certas @ 4) on 11/23/2021      PLAN:  Neuro:   - q4 hour neuro checks  - tylenol for pain control and fevers  - out of bed with assistance  - pt/ot - subacute rehab upon discharge  - VPS Certas @4    Respiratory:   - on trach collar  - suction prn- moderate amount of secretions  - continue 3% sodium chloride inhalation  - ID following for Tracheobronchitis.      CV:  -keep sbp 100-140  -labetalol for hypertension  -atorvastatin for hyperlipidemia     Endocrine:   - NPH 10U and JOSE MARTIN for glucose control  - keep fingersticks 100-180    DVT ppx:   - Eliquis 5mg BID, H/H stable. cleared by Dr. Page.   - persistent bilateral below knee dvt, awaiting repeat serial LE dopplers    ID:   - remains afebrile  - Continue PO vanco for c. diff, day 9 of 14.   - Cont Giorgio Nebs, tracheobronchitis   - Cefepime day 2 of 5 per ID, Pseudomonas tracheobronchitis  - ID following    GI:    - tolerating tube feeds, Glucerna.     Renal:  - Patient with chronic urinary retention, Barney inserted 11/28, negative UA at the time.  Rectal tube removed yesterday, Will consider TOV when CDiff improves.     PT/OT:   - CHUYITA when cleared and accepted.     dooyoo # [id number]

## 2021-12-04 NOTE — PROGRESS NOTE ADULT - ASSESSMENT
61 year old man with respiratory failure d/t ICH    1. ICH  -per neurosurgery, s/p EVD  -for  shunt    2. Respiratory failure  -for tracheostomy, will require long term enteral nutrition  -s/p PEG, tolerating feeds  - aspiration precautions     3. Enterobacter PNA  -s/p course of antibiotics     4. afib with RVR    5. Anemia, no overt GI bleed. EGD unremarkable.  -trend CBC    6. Cdiff infection on PO vanco  -no BM documented today    7. DVT on a/c  -would give PPI    [facility]  Gastroenterology and Hepatology  [address] [place], [place]  Office: [phone]  Cell: [phone]

## 2021-12-04 NOTE — PROGRESS NOTE ADULT - PROBLEM SELECTOR PLAN 1
S/p VPS placement on 11/23.   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 EVD clamped & removed.  cont mgt as per neurosurgery

## 2021-12-04 NOTE — PROGRESS NOTE ADULT - ASSESSMENT
62 yo male with HTN admitted 11/4 with severe headache and found to have cerebella bleed.  He is s/p posterior fossa decompression on 11/4 followed by craniectomy and PICA aneurysm resection on 11/11 and placement of VPS.  S/p trach and peg. He has distal DVT's and was off anticoagulation.He has been started on apixaban.  He received cefepime 11/4-11/21 for respiratory coverage.  He developed watery diarrhea on 11/26 and was diagnosed with C diff.  Was restarted on cefepime on 11/28 for concerns of pneumonia since had low grade temps.  CXR's remain clear.  Ct of brain showed residual blood.  Pseudomonas in sputum found to be resistant to carbapenems & quinolones.  However, sputum now also isolating strep pneumoniae ? contributing to tracheobronchitis as well    11/28 urine and blood cultures are negative at 48 hours.  His trach secretions still have an odor and are moderate.  Suggest:  1. Continue enteral vanco for C diff, day 9/14, stools still loose  2. Will  continue  Cefepime to Ulises nebs, in order to treat both bacteria from sputum cx , perhaps a 4-5 day course of cefepime, 7 days ulises  3.Anticoagulation per NS(apixaban)  4.Supportive care

## 2021-12-04 NOTE — PROGRESS NOTE ADULT - SUBJECTIVE AND OBJECTIVE BOX
SUBJECTIVE:  Patient seen and examined, no acute changes in exam, on trach collar.    OVERNIGHT EVENTS: No acute events    Vital Signs Last 24 Hrs  T(C): 36.6 (04 Dec 2021 08:38), Max: 37.3 (03 Dec 2021 15:29)  T(F): 97.9 (04 Dec 2021 08:38), Max: 99.1 (03 Dec 2021 15:29)  HR: 84 (04 Dec 2021 08:38) (84 - 91)  BP: 136/74 (04 Dec 2021 08:38) (109/67 - 136/76)  BP(mean): --  RR: 18 (04 Dec 2021 08:38) (18 - 18)  SpO2: 97% (04 Dec 2021 08:38) (94% - 97%)    PHYSICAL EXAM:    General: No Acute Distress     Neurological: ON TRACH COLLAR, Alert OE, non verbal, uppers wds, lowers trace movement, follow commands wiggles toes.   Pulmonary: +Secretions from trach, Clear to Auscultation, No Rales, No Rhonchi, No Wheezes   Cardiovascular:  Regular Rate and Rhythm   Gastrointestinal: Soft, Nontender, Nondistended   Incision:     LABS:                          8.8    7.28  )-----------( 427      ( 03 Dec 2021 10:27 )             28.3     12-03    133<L>  |  95<L>  |  32<H>  ----------------------------<  164<H>  4.9   |  29  |  0.93    Ca    8.9      03 Dec 2021 10:27        12-02 @ 07:01  -  12-03 @ 07:00  --------------------------------------------------------  IN: 0 mL / OUT: 1450 mL / NET: -1450 mL      DRAINS:     MEDICATIONS:  Antibiotics:  cefepime   IVPB 2000 milliGRAM(s) IV Intermittent three times a day  tobramycin for Nebulization 300 milliGRAM(s) Inhalation every 12 hours  vancomycin    Solution 125 milliGRAM(s) Oral every 6 hours    Neuro:  acetaminophen    Suspension .. 650 milliGRAM(s) Oral every 6 hours PRN Temp greater or equal to 38C (100.4F), Mild Pain (1 - 3)    Cardiac:  labetalol 100 milliGRAM(s) Oral two times a day    Pulm:  sodium chloride 3%  Inhalation 3 milliLiter(s) Inhalation every 6 hours    GI/:    Other:   apixaban 5 milliGRAM(s) Oral every 12 hours  artificial  tears Solution 1 Drop(s) Both EYES every 6 hours  atorvastatin 40 milliGRAM(s) Oral at bedtime  chlorhexidine 0.12% Liquid 15 milliLiter(s) Oral Mucosa two times a day  chlorhexidine 4% Liquid 1 Application(s) Topical <User Schedule>  dextrose 50% Injectable 25 Gram(s) IV Push once  dextrose 50% Injectable 12.5 Gram(s) IV Push once  insulin lispro (ADMELOG) corrective regimen sliding scale   SubCutaneous every 6 hours  insulin NPH human recombinant 10 Unit(s) SubCutaneous every 6 hours  nystatin Powder 1 Application(s) Topical two times a day

## 2021-12-05 LAB
ANION GAP SERPL CALC-SCNC: 11 MMOL/L — SIGNIFICANT CHANGE UP (ref 5–17)
BUN SERPL-MCNC: 39 MG/DL — HIGH (ref 7–23)
CALCIUM SERPL-MCNC: 9.4 MG/DL — SIGNIFICANT CHANGE UP (ref 8.4–10.5)
CHLORIDE SERPL-SCNC: 101 MMOL/L — SIGNIFICANT CHANGE UP (ref 96–108)
CO2 SERPL-SCNC: 27 MMOL/L — SIGNIFICANT CHANGE UP (ref 22–31)
CREAT SERPL-MCNC: 1.06 MG/DL — SIGNIFICANT CHANGE UP (ref 0.5–1.3)
GLUCOSE SERPL-MCNC: 154 MG/DL — HIGH (ref 70–99)
HCT VFR BLD CALC: 29.6 % — LOW (ref 39–50)
HGB BLD-MCNC: 9.3 G/DL — LOW (ref 13–17)
MCHC RBC-ENTMCNC: 29.5 PG — SIGNIFICANT CHANGE UP (ref 27–34)
MCHC RBC-ENTMCNC: 31.4 GM/DL — LOW (ref 32–36)
MCV RBC AUTO: 94 FL — SIGNIFICANT CHANGE UP (ref 80–100)
NRBC # BLD: 0 /100 WBCS — SIGNIFICANT CHANGE UP (ref 0–0)
PLATELET # BLD AUTO: 450 K/UL — HIGH (ref 150–400)
POTASSIUM SERPL-MCNC: 4.8 MMOL/L — SIGNIFICANT CHANGE UP (ref 3.5–5.3)
POTASSIUM SERPL-SCNC: 4.8 MMOL/L — SIGNIFICANT CHANGE UP (ref 3.5–5.3)
RBC # BLD: 3.15 M/UL — LOW (ref 4.2–5.8)
RBC # FLD: 13.5 % — SIGNIFICANT CHANGE UP (ref 10.3–14.5)
SODIUM SERPL-SCNC: 139 MMOL/L — SIGNIFICANT CHANGE UP (ref 135–145)
WBC # BLD: 10.73 K/UL — HIGH (ref 3.8–10.5)
WBC # FLD AUTO: 10.73 K/UL — HIGH (ref 3.8–10.5)

## 2021-12-05 PROCEDURE — 99221 1ST HOSP IP/OBS SF/LOW 40: CPT | Mod: 25

## 2021-12-05 PROCEDURE — 99233 SBSQ HOSP IP/OBS HIGH 50: CPT

## 2021-12-05 RX ADMIN — CHLORHEXIDINE GLUCONATE 15 MILLILITER(S): 213 SOLUTION TOPICAL at 19:12

## 2021-12-05 RX ADMIN — CEFEPIME 100 MILLIGRAM(S): 1 INJECTION, POWDER, FOR SOLUTION INTRAMUSCULAR; INTRAVENOUS at 13:56

## 2021-12-05 RX ADMIN — APIXABAN 5 MILLIGRAM(S): 2.5 TABLET, FILM COATED ORAL at 06:21

## 2021-12-05 RX ADMIN — CEFEPIME 100 MILLIGRAM(S): 1 INJECTION, POWDER, FOR SOLUTION INTRAMUSCULAR; INTRAVENOUS at 06:23

## 2021-12-05 RX ADMIN — Medication 1 DROP(S): at 12:54

## 2021-12-05 RX ADMIN — Medication 100 MILLIGRAM(S): at 06:21

## 2021-12-05 RX ADMIN — Medication 300 MILLIGRAM(S): at 19:43

## 2021-12-05 RX ADMIN — Medication 1 DROP(S): at 06:24

## 2021-12-05 RX ADMIN — HUMAN INSULIN 12 UNIT(S): 100 INJECTION, SUSPENSION SUBCUTANEOUS at 18:26

## 2021-12-05 RX ADMIN — Medication 125 MILLIGRAM(S): at 19:13

## 2021-12-05 RX ADMIN — CEFEPIME 100 MILLIGRAM(S): 1 INJECTION, POWDER, FOR SOLUTION INTRAMUSCULAR; INTRAVENOUS at 22:57

## 2021-12-05 RX ADMIN — ATORVASTATIN CALCIUM 40 MILLIGRAM(S): 80 TABLET, FILM COATED ORAL at 22:57

## 2021-12-05 RX ADMIN — Medication 2: at 06:20

## 2021-12-05 RX ADMIN — SODIUM CHLORIDE 3 MILLILITER(S): 9 INJECTION INTRAMUSCULAR; INTRAVENOUS; SUBCUTANEOUS at 06:23

## 2021-12-05 RX ADMIN — NYSTATIN CREAM 1 APPLICATION(S): 100000 CREAM TOPICAL at 06:24

## 2021-12-05 RX ADMIN — CHLORHEXIDINE GLUCONATE 15 MILLILITER(S): 213 SOLUTION TOPICAL at 06:24

## 2021-12-05 RX ADMIN — SODIUM CHLORIDE 3 MILLILITER(S): 9 INJECTION INTRAMUSCULAR; INTRAVENOUS; SUBCUTANEOUS at 19:42

## 2021-12-05 RX ADMIN — HUMAN INSULIN 12 UNIT(S): 100 INJECTION, SUSPENSION SUBCUTANEOUS at 06:20

## 2021-12-05 RX ADMIN — Medication 4: at 19:47

## 2021-12-05 RX ADMIN — SODIUM CHLORIDE 3 MILLILITER(S): 9 INJECTION INTRAMUSCULAR; INTRAVENOUS; SUBCUTANEOUS at 22:59

## 2021-12-05 RX ADMIN — NYSTATIN CREAM 1 APPLICATION(S): 100000 CREAM TOPICAL at 19:14

## 2021-12-05 RX ADMIN — Medication 1 DROP(S): at 19:11

## 2021-12-05 RX ADMIN — Medication 125 MILLIGRAM(S): at 13:58

## 2021-12-05 RX ADMIN — APIXABAN 5 MILLIGRAM(S): 2.5 TABLET, FILM COATED ORAL at 19:13

## 2021-12-05 RX ADMIN — SODIUM CHLORIDE 3 MILLILITER(S): 9 INJECTION INTRAMUSCULAR; INTRAVENOUS; SUBCUTANEOUS at 12:06

## 2021-12-05 RX ADMIN — Medication 125 MILLIGRAM(S): at 06:21

## 2021-12-05 RX ADMIN — Medication 100 MILLIGRAM(S): at 19:14

## 2021-12-05 RX ADMIN — PANTOPRAZOLE SODIUM 40 MILLIGRAM(S): 20 TABLET, DELAYED RELEASE ORAL at 14:27

## 2021-12-05 RX ADMIN — Medication 125 MILLIGRAM(S): at 22:58

## 2021-12-05 RX ADMIN — HUMAN INSULIN 12 UNIT(S): 100 INJECTION, SUSPENSION SUBCUTANEOUS at 13:57

## 2021-12-05 RX ADMIN — Medication 300 MILLIGRAM(S): at 06:23

## 2021-12-05 NOTE — PROGRESS NOTE ADULT - SUBJECTIVE AND OBJECTIVE BOX
Patient is a 61y old  Male who presents with a chief complaint of Cerebellar heme (05 Dec 2021 10:05)      SUBJECTIVE / OVERNIGHT EVENTS:  TMax: 98.8F  HR: 89 (84 - 96)  BP: 129/83  (123/77 - 137/84)  RR: 18  (18 - 18)  SpO2: 95%  (95% - 98%)  NO BM reported but the nurse states that patient had loose stool today       ADDITIONAL REVIEW OF SYSTEMS: Unable to obtain     MEDICATIONS  (STANDING):  apixaban 5 milliGRAM(s) Oral every 12 hours  artificial  tears Solution 1 Drop(s) Both EYES every 6 hours  atorvastatin 40 milliGRAM(s) Oral at bedtime  cefepime   IVPB 2000 milliGRAM(s) IV Intermittent every 8 hours  chlorhexidine 0.12% Liquid 15 milliLiter(s) Oral Mucosa two times a day  chlorhexidine 4% Liquid 1 Application(s) Topical <User Schedule>  dextrose 50% Injectable 25 Gram(s) IV Push once  dextrose 50% Injectable 12.5 Gram(s) IV Push once  insulin lispro (ADMELOG) corrective regimen sliding scale   SubCutaneous every 6 hours  insulin NPH human recombinant 12 Unit(s) SubCutaneous every 6 hours  labetalol 100 milliGRAM(s) Oral two times a day  nystatin Powder 1 Application(s) Topical two times a day  pantoprazole  Injectable 40 milliGRAM(s) IV Push daily  sodium chloride 3%  Inhalation 3 milliLiter(s) Inhalation every 6 hours  tobramycin for Nebulization 300 milliGRAM(s) Inhalation every 12 hours  vancomycin    Solution 125 milliGRAM(s) Oral every 6 hours    MEDICATIONS  (PRN):  acetaminophen    Suspension .. 650 milliGRAM(s) Oral every 6 hours PRN Temp greater or equal to 38C (100.4F), Mild Pain (1 - 3)      CAPILLARY BLOOD GLUCOSE      POCT Blood Glucose.: 173 mg/dL (05 Dec 2021 05:58)  POCT Blood Glucose.: 210 mg/dL (04 Dec 2021 23:21)  POCT Blood Glucose.: 187 mg/dL (04 Dec 2021 16:20)  POCT Blood Glucose.: 185 mg/dL (04 Dec 2021 13:47)    I&O's Summary    04 Dec 2021 07:01  -  05 Dec 2021 07:00  --------------------------------------------------------  IN: 0 mL / OUT: 1100 mL / NET: -1100 mL    05 Dec 2021 07:01  -  05 Dec 2021 10:43  --------------------------------------------------------  IN: 0 mL / OUT: 20 mL / NET: -20 mL        PHYSICAL EXAM:  Vital Signs Last 24 Hrs  T(C): 36.8 (05 Dec 2021 09:15), Max: 37.1 (04 Dec 2021 11:19)  T(F): 98.3 (05 Dec 2021 09:15), Max: 98.8 (04 Dec 2021 11:19)  HR: 89 (05 Dec 2021 09:15) (84 - 96)  BP: 129/83 (05 Dec 2021 09:15) (123/77 - 137/84)  BP(mean): --  RR: 18 (05 Dec 2021 05:44) (18 - 18)  SpO2: 95% (05 Dec 2021 05:44) (95% - 98%)    PHYSICAL EXAM:  CONSTITUTIONAL: Awake, non-verbal  EYES: conjunctiva and sclera clear  ENMT: Moist oral mucosa,   NECK: Supple, no palpable masses; Trach in place   RESPIRATORY: Normal respiratory effort; coarse breath sounds bilaterally  CARDIOVASCULAR: Regular rate and rhythm, normal S1 and S2, no murmur/rub/gallop; No lower extremity edema  ABDOMEN: Nontender to palpation, normoactive bowel sounds, no rebound/guarding; PEG in place with no erythema   MUSCULOSKELETAL: No clubbing or cyanosis of digits; no joint swelling or tenderness to palpation  PSYCH: Non-verbal  NEUROLOGY: Not following commands        LABS:                      RADIOLOGY & ADDITIONAL TESTS:    Imaging Personally Reviewed:    Electrocardiogram Personally Reviewed:    COORDINATION OF CARE:  Care Discussed with Consultants/Other Providers [Y/N]:  Prior or Outpatient Records Reviewed [Y/N]:

## 2021-12-05 NOTE — PROGRESS NOTE ADULT - SUBJECTIVE AND OBJECTIVE BOX
CC: f/u for C Diff and purulent tracheobronchitis    Patient reports: he is non verbal, follows some commands    REVIEW OF SYSTEMS:  All other review of systems negative (Comprehensive ROS): moderate secretions, diarrhea decreased    Antimicrobials Day #  :  cefepime   IVPB 2000 milliGRAM(s) IV Intermittent every 8 hours day 4/5  tobramycin for Nebulization 300 milliGRAM(s) Inhalation every 12 hours day 5/7  vancomycin    Solution 125 milliGRAM(s) Oral every 6 hours day 10/14    Other Medications Reviewed  MEDICATIONS  (STANDING):  apixaban 5 milliGRAM(s) Oral every 12 hours  artificial  tears Solution 1 Drop(s) Both EYES every 6 hours  atorvastatin 40 milliGRAM(s) Oral at bedtime  cefepime   IVPB 2000 milliGRAM(s) IV Intermittent every 8 hours  chlorhexidine 0.12% Liquid 15 milliLiter(s) Oral Mucosa two times a day  chlorhexidine 4% Liquid 1 Application(s) Topical <User Schedule>  dextrose 50% Injectable 25 Gram(s) IV Push once  dextrose 50% Injectable 12.5 Gram(s) IV Push once  insulin lispro (ADMELOG) corrective regimen sliding scale   SubCutaneous every 6 hours  insulin NPH human recombinant 12 Unit(s) SubCutaneous every 6 hours  labetalol 100 milliGRAM(s) Oral two times a day  nystatin Powder 1 Application(s) Topical two times a day  pantoprazole  Injectable 40 milliGRAM(s) IV Push daily  sodium chloride 3%  Inhalation 3 milliLiter(s) Inhalation every 6 hours  tobramycin for Nebulization 300 milliGRAM(s) Inhalation every 12 hours  vancomycin    Solution 125 milliGRAM(s) Oral every 6 hours    T(F): 98.5 (12-05-21 @ 05:44), Max: 98.8 (12-04-21 @ 11:19)  HR: 96 (12-05-21 @ 05:44)  BP: 137/84 (12-05-21 @ 05:44)  RR: 18 (12-05-21 @ 05:44)  SpO2: 95% (12-05-21 @ 05:44)  Wt(kg): --    PHYSICAL EXAM:  General: alert, no acute distress  Eyes:  anicteric, no conjunctival injection, no discharge  Oropharynx: no lesions or injection 	  Neck: supple, trach  Lungs: scattered ronchi  Heart: regular rate and rhythm; no murmur, rubs or gallops  Abdomen: soft, nondistended, nontender, without mass or organomegaly, peg  Skin: no lesions  Extremities: no clubbing, cyanosis, or edema  Neurologic: alert, functional quadriplegia   teixeira    LAB RESULTS:                        8.8    7.28  )-----------( 427      ( 03 Dec 2021 10:27 )             28.3     12-03    133<L>  |  95<L>  |  32<H>  ----------------------------<  164<H>  4.9   |  29  |  0.93    Ca    8.9      03 Dec 2021 10:27          MICROBIOLOGY:  RECENT CULTURES:      RADIOLOGY REVIEWED:  < from: [facility] Duplex Lower Ext Vein Scan, Beckie (12.03.21 @ 14:00) >  IMPRESSION:  Persistent bilateral calf DVT.    No evidence of interval clot propagation.    < end of copied text >

## 2021-12-05 NOTE — PROGRESS NOTE ADULT - NSPROGADDITIONALINFOA_GEN_ALL_CORE
Janice Sylvester   HOspitalist ( covering Neurosurgery services)   [phone] ( Beeper )   [id number] ( spectra)

## 2021-12-05 NOTE — PROGRESS NOTE ADULT - ASSESSMENT
61M hx HTN, DM, cardiac stent on ASA. 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 on 11/5/2021, s/p cerebral angiogram showing left PICA fistula on 11/9/2021, s/p crani for PICA aneurysm resection on 11/10/2021, s/p trach/PEG on 11/19/21, s/p insertion of right parietal ventriculoperitoneal shunt (Certas @ 4) on 11/23/2021      PLAN:  Neuro:   - q4 hour neuro checks  - tylenol for pain control and fevers  - out of bed with assistance  - pt/ot - subacute rehab upon discharge  - VPS Certas @4    Respiratory:   - on trach collar  - Trach changed to 6 sheiley uncuffed today by ENT  - suction prn- moderate amount of secretions  - continue 3% sodium chloride inhalation  - ID following for Tracheobronchitis.      CV:  -keep sbp 100-140  -labetalol for hypertension  -atorvastatin for hyperlipidemia     Endocrine:   - NPH 10U and JOSE MARTIN for glucose control  - keep fingersticks 100-180    DVT ppx:   - Eliquis 5mg BID, H/H stable. cleared by Dr. Page.   - persistent bilateral below knee dvt, awaiting repeat serial LE dopplers    ID:   - remains afebrile  - Continue PO vanco for c. diff, day 10 of 14.   - Cont Giorgio Nebs, tracheobronchitis   - Cefepime day 3 of 5 per ID, Pseudomonas tracheobronchitis  - ID following    GI:    - tolerating tube feeds, Glucerna.     Renal:  - Patient with chronic urinary retention, Barney inserted 11/28, negative UA at the time.  Rectal tube removed yesterday, Will consider TOV when CDiff improves.     PT/OT:   - CHUYITA when cleared and accepted.     Spectralink # [id number]   61M hx HTN, DM, cardiac stent on ASA. 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 on 11/5/2021, s/p cerebral angiogram showing left PICA fistula on 11/9/2021, s/p crani for PICA aneurysm resection on 11/10/2021, s/p trach/PEG on 11/19/21, s/p insertion of right parietal ventriculoperitoneal shunt (Certas @ 4) on 11/23/2021      PLAN:  Neuro:   - q4 hour neuro checks  - tylenol for pain control and fevers  - out of bed with assistance  - pt/ot - subacute rehab upon discharge  - VPS Certas @4    Respiratory:   - on trach collar  - Trach changed to 6 sheiley uncuffed today by ENT  - suction prn- moderate amount of secretions  - continue 3% sodium chloride inhalation  - ID following for Tracheobronchitis.      CV:  -keep sbp 100-140  -labetalol for hypertension  -atorvastatin for hyperlipidemia     Endocrine:   - NPH 10U and JOSE MARTIN for glucose control  - keep fingersticks 100-180    DVT ppx:   - Eliquis 5mg BID, H/H stable. cleared by Dr. aPge.   - persistent bilateral below knee dvt, awaiting repeat serial LE dopplers    ID:   - remains afebrile  - Continue PO vanco for c. diff, day 10 of 14.   - Cont Giorgio Nebs, tracheobronchitis   - Cefepime day 3 of 5 per ID, Pseudomonas tracheobronchitis  - ID following    GI:    - tolerating tube feeds, Glucerna.     Renal:  - Patient with chronic urinary retention, Barney inserted 11/28, negative UA at the time., Will consider TOV when CDiff improves.     PT/OT:   - CHUYITA when cleared and accepted.     Llesiant # [id number]

## 2021-12-05 NOTE — PROGRESS NOTE ADULT - ASSESSMENT
62 yo male with HTN admitted 11/4 with severe headache and found to have cerebella bleed.  He is s/p posterior fossa decompression on 11/4 followed by craniectomy and PICA aneurysm resection on 11/11 and placement of VPS.  S/p trach and peg. He has distal DVT's and was off anticoagulation.He has been started on apixaban.  He received cefepime 11/4-11/21 for respiratory coverage.  He developed watery diarrhea on 11/26 and was diagnosed with C diff.  Was restarted on cefepime on 11/28 for concerns of pneumonia since had low grade temps.  CXR's remain clear.  Ct of brain showed residual blood.  Pseudomonas in sputum found to be resistant to carbapenems & quinolones.  However, sputum now also isolating strep pneumoniae ? contributing to tracheobronchitis as well    11/28 urine and blood cultures are negative at 48 hours.  His trach secretions still have an odor and are moderate.  Diarrhea is improving  Suggest:  1. Continue enteral vanco for C diff, day 10/14, stools still loose  2. Will  continue  Cefepime and  Ulises nebs, in order to treat both bacteria from sputum cx , perhaps a 5 day course of cefepime, 7 days ulises  3.Anticoagulation per NS(apixaban)  4.Supportive care per NS and medicine  5.Barney for retention  4.Supportive care

## 2021-12-05 NOTE — PROGRESS NOTE ADULT - PROBLEM SELECTOR PLAN 3
On inhaled Tobramycin for 7 days and iv Cefepime for 5days  for likely tracheobronchitis sec to Pseudomonas and Strep Pneumoniae which grew from Sputum as per IV   ID is on the case   cont to monitor SPO2 and cont resp care

## 2021-12-05 NOTE — PROGRESS NOTE ADULT - SUBJECTIVE AND OBJECTIVE BOX
SUBJECTIVE:  Patient seen and examined, no acute changes in exam, on trach collar.    OVERNIGHT EVENTS: Trach dislodged this am, replaced with a 6-uncuffed    Vital Signs Last 24 Hrs  T(C): 36.8 (05 Dec 2021 09:15), Max: 37.1 (04 Dec 2021 11:19)  T(F): 98.3 (05 Dec 2021 09:15), Max: 98.8 (04 Dec 2021 11:19)  HR: 89 (05 Dec 2021 09:15) (84 - 96)  BP: 129/83 (05 Dec 2021 09:15) (123/77 - 137/84)  BP(mean): --  RR: 18 (05 Dec 2021 05:44) (18 - 18)  SpO2: 95% (05 Dec 2021 05:44) (95% - 98%)    PHYSICAL EXAM:    General: No Acute Distress     Neurological: ON TRACH COLLAR, Alert OE, non verbal, uppers wds, lowers trace movement, follow commands wiggles toes.   Pulmonary: +Secretions from trach, Clear to Auscultation, No Rales, No Rhonchi, No Wheezes   Cardiovascular:  Regular Rate and Rhythm   Gastrointestinal: Soft, Nontender, Nondistended   Incision:     LABS:    no new labs    DRAINS:     MEDICATIONS:  Antibiotics:  cefepime   IVPB 2000 milliGRAM(s) IV Intermittent three times a day  tobramycin for Nebulization 300 milliGRAM(s) Inhalation every 12 hours  vancomycin    Solution 125 milliGRAM(s) Oral every 6 hours    Neuro:  acetaminophen    Suspension .. 650 milliGRAM(s) Oral every 6 hours PRN Temp greater or equal to 38C (100.4F), Mild Pain (1 - 3)    Cardiac:  labetalol 100 milliGRAM(s) Oral two times a day    Pulm:  sodium chloride 3%  Inhalation 3 milliLiter(s) Inhalation every 6 hours    GI/:    Other:   apixaban 5 milliGRAM(s) Oral every 12 hours  artificial  tears Solution 1 Drop(s) Both EYES every 6 hours  atorvastatin 40 milliGRAM(s) Oral at bedtime  chlorhexidine 0.12% Liquid 15 milliLiter(s) Oral Mucosa two times a day  chlorhexidine 4% Liquid 1 Application(s) Topical <User Schedule>  dextrose 50% Injectable 25 Gram(s) IV Push once  dextrose 50% Injectable 12.5 Gram(s) IV Push once  insulin lispro (ADMELOG) corrective regimen sliding scale   SubCutaneous every 6 hours  insulin NPH human recombinant 10 Unit(s) SubCutaneous every 6 hours  nystatin Powder 1 Application(s) Topical two times a day

## 2021-12-05 NOTE — PROGRESS NOTE ADULT - PROBLEM SELECTOR PLAN 6
right soleal vein DVT and persistent left posterior tibial, peroneal, gastrocnemius and soleal vein DVT without proximal propagation on last duplex on 11/24  - started on Eliquis 12/2--> monitor for any bleed   - Vasc team is on the case

## 2021-12-05 NOTE — PROGRESS NOTE ADULT - PROBLEM SELECTOR PLAN 2
Continue PO Vanco for 14 days total as per ID ( D#10)   Diarrhea improved. Rectal tube has been removed.  one stool on 12/4 recorded , but nurse  states that pt had loose stool on 12/5   cont to monitor

## 2021-12-05 NOTE — PROGRESS NOTE ADULT - ASSESSMENT
61 year old man with respiratory failure d/t ICH    1. ICH  -per neurosurgery, s/p EVD  -for  shunt    2. Respiratory failure  -for tracheostomy, will require long term enteral nutrition  -s/p PEG, tolerating feeds  - aspiration precautions     3. Enterobacter PNA  -s/p course of antibiotics     4. afib with RVR    5. Anemia, no overt GI bleed. EGD unremarkable.  -trend CBC    6. Cdiff infection on PO vanco  -no BM documented today    7. DVT on a/c  -would give PPI    [facility]  Gastroenterology and Hepatology  [address] [place], [place]  Office: [phone]  Cell: [phone]

## 2021-12-05 NOTE — PROGRESS NOTE ADULT - ASSESSMENT
60 yo m seen after trach partially self-decannulated. Trach replaced with #6 cuffless shiley and confirmed with tracheoscopy

## 2021-12-05 NOTE — PROGRESS NOTE ADULT - SUBJECTIVE AND OBJECTIVE BOX
ENT ISSUE/POD: s/p tracheostomy    HPI: 62 yo male s/p trach 11/19/21 found to be partially self-decannulated likely after forceful cough. Per team, may have been like that for a few days. They were unable to push trach back in. Cannot pass suciton catheter through the trach. pt with #8 cuffed shiley tolerating TC ATC >3 days with no planned procedures coming up. Pt has been saturating well despite partially decannulated trach      PAST MEDICAL & SURGICAL HISTORY:  HTN (hypertension)    Diabetes mellitus      Allergies    penicillin (Other)    Intolerances      MEDICATIONS  (STANDING):  apixaban 5 milliGRAM(s) Oral every 12 hours  artificial  tears Solution 1 Drop(s) Both EYES every 6 hours  atorvastatin 40 milliGRAM(s) Oral at bedtime  cefepime   IVPB 2000 milliGRAM(s) IV Intermittent every 8 hours  chlorhexidine 0.12% Liquid 15 milliLiter(s) Oral Mucosa two times a day  chlorhexidine 4% Liquid 1 Application(s) Topical <User Schedule>  dextrose 50% Injectable 25 Gram(s) IV Push once  dextrose 50% Injectable 12.5 Gram(s) IV Push once  insulin lispro (ADMELOG) corrective regimen sliding scale   SubCutaneous every 6 hours  insulin NPH human recombinant 12 Unit(s) SubCutaneous every 6 hours  labetalol 100 milliGRAM(s) Oral two times a day  nystatin Powder 1 Application(s) Topical two times a day  pantoprazole  Injectable 40 milliGRAM(s) IV Push daily  sodium chloride 3%  Inhalation 3 milliLiter(s) Inhalation every 6 hours  tobramycin for Nebulization 300 milliGRAM(s) Inhalation every 12 hours  vancomycin    Solution 125 milliGRAM(s) Oral every 6 hours    MEDICATIONS  (PRN):  acetaminophen    Suspension .. 650 milliGRAM(s) Oral every 6 hours PRN Temp greater or equal to 38C (100.4F), Mild Pain (1 - 3)    ROS:   ENT: all negative except as noted in HPI   Pulm: denies SOB, cough, hemoptysis  Neuro: denies numbness/tingling, loss of sensation  Endo: denies heat/cold intolerance, excessive sweating      Vital Signs Last 24 Hrs  T(C): 36.8 (05 Dec 2021 09:15), Max: 37.1 (04 Dec 2021 11:19)  T(F): 98.3 (05 Dec 2021 09:15), Max: 98.8 (04 Dec 2021 11:19)  HR: 89 (05 Dec 2021 09:15) (84 - 96)  BP: 129/83 (05 Dec 2021 09:15) (123/77 - 137/84)  BP(mean): --  RR: 18 (05 Dec 2021 05:44) (18 - 18)  SpO2: 95% (05 Dec 2021 05:44) (95% - 98%)              PHYSICAL EXAM:  Gen: NAD  Skin: No rashes, bruises, or lesions  Head: Normocephalic, Atraumatic  Face: no edema, erythema, or fluctuance. Parotid glands soft without mass  Eyes: no scleral injection  Nose: Nares bilaterally patent, no discharge  Mouth: No Stridor / Drooling / Trismus.  Mucosa moist, tongue/uvula midline, oropharynx clear  Neck: Flat, supple, no lymphadenopathy, trachea midline, no masses, #8 cuffed shiley with deflated cuff protruding from sotma  Lymphatic: No lymphadenopathy  Resp: breathing easily, no stridor  Neuro: facial nerve intact, no facial droop        Procedure:  #8 cuffed shiley removed, attempt to place #6 cuffless with obturator guidance difficult with resistance. Scope passed through stoma and deemed patent down to david.  Trach eventually placed properly after multiple attempts. Placement  confirmed via tracheoscopy, camera passed through #6 cuffless shiley cannula and trachea widely patent down to david. Trach centered in good position.

## 2021-12-06 LAB
APPEARANCE UR: ABNORMAL
BACTERIA # UR AUTO: NEGATIVE — SIGNIFICANT CHANGE UP
BILIRUB UR-MCNC: NEGATIVE — SIGNIFICANT CHANGE UP
COLOR SPEC: YELLOW — SIGNIFICANT CHANGE UP
DIFF PNL FLD: ABNORMAL
EPI CELLS # UR: 2 /HPF — SIGNIFICANT CHANGE UP
GLUCOSE UR QL: NEGATIVE — SIGNIFICANT CHANGE UP
HYALINE CASTS # UR AUTO: 6 /LPF — HIGH (ref 0–2)
KETONES UR-MCNC: SIGNIFICANT CHANGE UP
LEUKOCYTE ESTERASE UR-ACNC: NEGATIVE — SIGNIFICANT CHANGE UP
NITRITE UR-MCNC: NEGATIVE — SIGNIFICANT CHANGE UP
PH UR: 6 — SIGNIFICANT CHANGE UP (ref 5–8)
PROT UR-MCNC: ABNORMAL
RBC CASTS # UR COMP ASSIST: 112 /HPF — HIGH (ref 0–4)
SARS-COV-2 RNA SPEC QL NAA+PROBE: SIGNIFICANT CHANGE UP
SP GR SPEC: 1.02 — SIGNIFICANT CHANGE UP (ref 1.01–1.02)
UROBILINOGEN FLD QL: NEGATIVE — SIGNIFICANT CHANGE UP
WBC UR QL: 3 /HPF — SIGNIFICANT CHANGE UP (ref 0–5)

## 2021-12-06 PROCEDURE — 99232 SBSQ HOSP IP/OBS MODERATE 35: CPT

## 2021-12-06 PROCEDURE — 71045 X-RAY EXAM CHEST 1 VIEW: CPT | Mod: 26

## 2021-12-06 RX ORDER — TOBRAMYCIN SULFATE 40 MG/ML
300 VIAL (ML) INJECTION EVERY 12 HOURS
Refills: 0 | Status: COMPLETED | OUTPATIENT
Start: 2021-12-06 | End: 2021-12-07

## 2021-12-06 RX ADMIN — ATORVASTATIN CALCIUM 40 MILLIGRAM(S): 80 TABLET, FILM COATED ORAL at 22:08

## 2021-12-06 RX ADMIN — CHLORHEXIDINE GLUCONATE 15 MILLILITER(S): 213 SOLUTION TOPICAL at 16:10

## 2021-12-06 RX ADMIN — Medication 125 MILLIGRAM(S): at 16:09

## 2021-12-06 RX ADMIN — HUMAN INSULIN 12 UNIT(S): 100 INJECTION, SUSPENSION SUBCUTANEOUS at 00:07

## 2021-12-06 RX ADMIN — Medication 2: at 16:23

## 2021-12-06 RX ADMIN — Medication 2: at 00:07

## 2021-12-06 RX ADMIN — CHLORHEXIDINE GLUCONATE 15 MILLILITER(S): 213 SOLUTION TOPICAL at 05:00

## 2021-12-06 RX ADMIN — SODIUM CHLORIDE 3 MILLILITER(S): 9 INJECTION INTRAMUSCULAR; INTRAVENOUS; SUBCUTANEOUS at 16:11

## 2021-12-06 RX ADMIN — Medication 300 MILLIGRAM(S): at 05:00

## 2021-12-06 RX ADMIN — Medication 100 MILLIGRAM(S): at 16:10

## 2021-12-06 RX ADMIN — APIXABAN 5 MILLIGRAM(S): 2.5 TABLET, FILM COATED ORAL at 16:11

## 2021-12-06 RX ADMIN — PANTOPRAZOLE SODIUM 40 MILLIGRAM(S): 20 TABLET, DELAYED RELEASE ORAL at 10:53

## 2021-12-06 RX ADMIN — APIXABAN 5 MILLIGRAM(S): 2.5 TABLET, FILM COATED ORAL at 05:00

## 2021-12-06 RX ADMIN — Medication 650 MILLIGRAM(S): at 16:08

## 2021-12-06 RX ADMIN — CEFEPIME 100 MILLIGRAM(S): 1 INJECTION, POWDER, FOR SOLUTION INTRAMUSCULAR; INTRAVENOUS at 13:54

## 2021-12-06 RX ADMIN — Medication 125 MILLIGRAM(S): at 05:00

## 2021-12-06 RX ADMIN — NYSTATIN CREAM 1 APPLICATION(S): 100000 CREAM TOPICAL at 05:00

## 2021-12-06 RX ADMIN — Medication 4: at 05:30

## 2021-12-06 RX ADMIN — Medication 1 DROP(S): at 05:00

## 2021-12-06 RX ADMIN — Medication 4: at 10:49

## 2021-12-06 RX ADMIN — HUMAN INSULIN 12 UNIT(S): 100 INJECTION, SUSPENSION SUBCUTANEOUS at 05:30

## 2021-12-06 RX ADMIN — CEFEPIME 100 MILLIGRAM(S): 1 INJECTION, POWDER, FOR SOLUTION INTRAMUSCULAR; INTRAVENOUS at 05:00

## 2021-12-06 RX ADMIN — CHLORHEXIDINE GLUCONATE 1 APPLICATION(S): 213 SOLUTION TOPICAL at 22:52

## 2021-12-06 RX ADMIN — HUMAN INSULIN 12 UNIT(S): 100 INJECTION, SUSPENSION SUBCUTANEOUS at 10:50

## 2021-12-06 RX ADMIN — Medication 125 MILLIGRAM(S): at 10:51

## 2021-12-06 RX ADMIN — Medication 100 MILLIGRAM(S): at 05:00

## 2021-12-06 RX ADMIN — SODIUM CHLORIDE 3 MILLILITER(S): 9 INJECTION INTRAMUSCULAR; INTRAVENOUS; SUBCUTANEOUS at 05:00

## 2021-12-06 RX ADMIN — NYSTATIN CREAM 1 APPLICATION(S): 100000 CREAM TOPICAL at 16:12

## 2021-12-06 RX ADMIN — HUMAN INSULIN 12 UNIT(S): 100 INJECTION, SUSPENSION SUBCUTANEOUS at 16:24

## 2021-12-06 RX ADMIN — SODIUM CHLORIDE 3 MILLILITER(S): 9 INJECTION INTRAMUSCULAR; INTRAVENOUS; SUBCUTANEOUS at 10:53

## 2021-12-06 RX ADMIN — Medication 650 MILLIGRAM(S): at 16:38

## 2021-12-06 RX ADMIN — Medication 1 DROP(S): at 10:49

## 2021-12-06 RX ADMIN — Medication 300 MILLIGRAM(S): at 17:49

## 2021-12-06 RX ADMIN — Medication 1 DROP(S): at 00:06

## 2021-12-06 RX ADMIN — Medication 1 DROP(S): at 17:48

## 2021-12-06 NOTE — PROGRESS NOTE ADULT - ASSESSMENT
61 year old man with respiratory failure d/t ICH    1. ICH  -per neurosurgery, s/p EVD  -for  shunt    2. Respiratory failure  -for tracheostomy, will require long term enteral nutrition  -s/p PEG, tolerating feeds  - aspiration precautions     3. Enterobacter PNA  -s/p course of antibiotics     4. afib with RVR    5. Anemia, no overt GI bleed. EGD unremarkable.  -trend CBC    6. Cdiff infection on PO vanco  -add banatrol for possible component of tube feeds contributing to diarrhea    7. DVT on a/c  -would give PPI    [facility]  Gastroenterology and Hepatology  [address] [place], [place]  Office: [phone]  Cell: [phone]

## 2021-12-06 NOTE — PROGRESS NOTE ADULT - ASSESSMENT
60 yo male with PMH of HTN, CAD s/p stent on DAPT, T2DM who complained of headache and subsequently become unresponsive. 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 s/p EVD clamp & removal.    NSCU course also c/b respiratory failure and dysphagia now s/p trach and PEG on 11/19. S/p VPS placement on 11/23. Completed course of cefepime for enterobacter and pseudomonas pneumonia on 11/21. Transferred out of NSCU on 11/25. Began to spike fevers with watery output from rectal tube found to be positive for c. diff, PO vanco started on 11/26.     Persistent fever, increased secretions, sputum colonized by Pseudomonas, Cefepime restarted on 11/28. Fever resolved

## 2021-12-06 NOTE — PROGRESS NOTE ADULT - ATTENDING COMMENTS
D/W family at bedside, his wife and his sister  continue eliquis 5mg BID for now    Aurea [phone]

## 2021-12-06 NOTE — PROGRESS NOTE ADULT - PROBLEM SELECTOR PLAN 2
Continue PO Vanco for 14 days total as per ID   Diarrhea improved. Rectal tube has been removed.  one stool on 12/4 recorded , but nurse states that pt had loose stool on 12/5   cont to monitor

## 2021-12-06 NOTE — PROGRESS NOTE ADULT - SUBJECTIVE AND OBJECTIVE BOX
Philippe Ng   Pager [phone]  Office [phone]      CC: Patient is a 61y old  Male who presents with a chief complaint of ICH (05 Dec 2021 10:43)      SUBJECTIVE / OVERNIGHT EVENTS:    MEDICATIONS  (STANDING):  apixaban 5 milliGRAM(s) Oral every 12 hours  artificial  tears Solution 1 Drop(s) Both EYES every 6 hours  atorvastatin 40 milliGRAM(s) Oral at bedtime  cefepime   IVPB 2000 milliGRAM(s) IV Intermittent every 8 hours  chlorhexidine 0.12% Liquid 15 milliLiter(s) Oral Mucosa two times a day  chlorhexidine 4% Liquid 1 Application(s) Topical <User Schedule>  dextrose 50% Injectable 25 Gram(s) IV Push once  dextrose 50% Injectable 12.5 Gram(s) IV Push once  insulin lispro (ADMELOG) corrective regimen sliding scale   SubCutaneous every 6 hours  insulin NPH human recombinant 12 Unit(s) SubCutaneous every 6 hours  labetalol 100 milliGRAM(s) Oral two times a day  nystatin Powder 1 Application(s) Topical two times a day  pantoprazole  Injectable 40 milliGRAM(s) IV Push daily  sodium chloride 3%  Inhalation 3 milliLiter(s) Inhalation every 6 hours  tobramycin for Nebulization 300 milliGRAM(s) Inhalation every 12 hours  vancomycin    Solution 125 milliGRAM(s) Oral every 6 hours    MEDICATIONS  (PRN):  acetaminophen    Suspension .. 650 milliGRAM(s) Oral every 6 hours PRN Temp greater or equal to 38C (100.4F), Mild Pain (1 - 3)      Vital Signs Last 24 Hrs  T(C): 37.1 (06 Dec 2021 08:42), Max: 37.4 (06 Dec 2021 04:50)  T(F): 98.8 (06 Dec 2021 08:42), Max: 99.3 (06 Dec 2021 04:50)  HR: 84 (06 Dec 2021 08:42) (84 - 96)  BP: 116/71 (06 Dec 2021 08:42) (113/76 - 147/89)  BP(mean): --  RR: 18 (06 Dec 2021 08:42) (18 - 18)  SpO2: 98% (06 Dec 2021 08:42) (95% - 100%)  CAPILLARY BLOOD GLUCOSE      POCT Blood Glucose.: 201 mg/dL (06 Dec 2021 10:48)  POCT Blood Glucose.: 223 mg/dL (06 Dec 2021 05:23)  POCT Blood Glucose.: 168 mg/dL (05 Dec 2021 23:08)  POCT Blood Glucose.: 217 mg/dL (05 Dec 2021 19:45)  POCT Blood Glucose.: 166 mg/dL (05 Dec 2021 18:14)  POCT Blood Glucose.: 149 mg/dL (05 Dec 2021 11:53)    I&O's Summary    05 Dec 2021 07:01  -  06 Dec 2021 07:00  --------------------------------------------------------  IN: 1560 mL / OUT: 770 mL / NET: 790 mL    06 Dec 2021 07:01  -  06 Dec 2021 11:42  --------------------------------------------------------  IN: 0 mL / OUT: 100 mL / NET: -100 mL      tele:    PHYSICAL EXAM:    GENERAL: NAD   HEENT: EOMI, PERRL  PULM: Clear to auscultation bilaterally  CV: Regular rate and rhythm; nl S1, S2; No murmurs, rubs, or gallops  ABDOMEN: Soft, Nontender, Nondistended; Bowel sounds present  EXTREMITIES/MSK:  No edema, calf tenderness   PSYCH: AAOx3  NEUROLOGY: non-focal          LABS:                        9.3    10.73 )-----------( 450      ( 05 Dec 2021 17:42 )             29.6     12-05    139  |  101  |  39<H>  ----------------------------<  154<H>  4.8   |  27  |  1.06    Ca    9.4      05 Dec 2021 17:42                  RADIOLOGY & ADDITIONAL TESTS:    Imaging Personally Reviewed:    Consultant(s) Notes Reviewed:      Care Discussed with Consultants/Other Providers:   Philippe Ng   Pager [phone]  Office [phone]      CC: Patient is a 61y old  Male who presents with a chief complaint of ICH (05 Dec 2021 10:43)      SUBJECTIVE / OVERNIGHT EVENTS: no events per nurse. no diarrhea. doesnt move UE but withdraws LE. coughs when suctioned    MEDICATIONS  (STANDING):  apixaban 5 milliGRAM(s) Oral every 12 hours  artificial  tears Solution 1 Drop(s) Both EYES every 6 hours  atorvastatin 40 milliGRAM(s) Oral at bedtime  cefepime   IVPB 2000 milliGRAM(s) IV Intermittent every 8 hours  chlorhexidine 0.12% Liquid 15 milliLiter(s) Oral Mucosa two times a day  chlorhexidine 4% Liquid 1 Application(s) Topical <User Schedule>  dextrose 50% Injectable 25 Gram(s) IV Push once  dextrose 50% Injectable 12.5 Gram(s) IV Push once  insulin lispro (ADMELOG) corrective regimen sliding scale   SubCutaneous every 6 hours  insulin NPH human recombinant 12 Unit(s) SubCutaneous every 6 hours  labetalol 100 milliGRAM(s) Oral two times a day  nystatin Powder 1 Application(s) Topical two times a day  pantoprazole  Injectable 40 milliGRAM(s) IV Push daily  sodium chloride 3%  Inhalation 3 milliLiter(s) Inhalation every 6 hours  tobramycin for Nebulization 300 milliGRAM(s) Inhalation every 12 hours  vancomycin    Solution 125 milliGRAM(s) Oral every 6 hours    MEDICATIONS  (PRN):  acetaminophen    Suspension .. 650 milliGRAM(s) Oral every 6 hours PRN Temp greater or equal to 38C (100.4F), Mild Pain (1 - 3)      Vital Signs Last 24 Hrs  T(C): 37.1 (06 Dec 2021 08:42), Max: 37.4 (06 Dec 2021 04:50)  T(F): 98.8 (06 Dec 2021 08:42), Max: 99.3 (06 Dec 2021 04:50)  HR: 84 (06 Dec 2021 08:42) (84 - 96)  BP: 116/71 (06 Dec 2021 08:42) (113/76 - 147/89)  BP(mean): --  RR: 18 (06 Dec 2021 08:42) (18 - 18)  SpO2: 98% (06 Dec 2021 08:42) (95% - 100%)  CAPILLARY BLOOD GLUCOSE      POCT Blood Glucose.: 201 mg/dL (06 Dec 2021 10:48)  POCT Blood Glucose.: 223 mg/dL (06 Dec 2021 05:23)  POCT Blood Glucose.: 168 mg/dL (05 Dec 2021 23:08)  POCT Blood Glucose.: 217 mg/dL (05 Dec 2021 19:45)  POCT Blood Glucose.: 166 mg/dL (05 Dec 2021 18:14)  POCT Blood Glucose.: 149 mg/dL (05 Dec 2021 11:53)    I&O's Summary    05 Dec 2021 07:01  -  06 Dec 2021 07:00  --------------------------------------------------------  IN: 1560 mL / OUT: 770 mL / NET: 790 mL    06 Dec 2021 07:01  -  06 Dec 2021 11:42  --------------------------------------------------------  IN: 0 mL / OUT: 100 mL / NET: -100 mL          PHYSICAL EXAM:    GENERAL: NAD   HEENT: EOMI, PERRL  PULM: Clear to auscultation bilaterally  CV: Regular rate and rhythm; nl S1, S2; No murmurs, rubs, or gallops  ABDOMEN: Soft, Nontender, Nondistended; Bowel sounds present  EXTREMITIES/MSK:  No edema, calf tenderness  PSYCH: Alert  NEUROLOGY: withdraws b/l LE to noxious stimuli; doesnt withdraw UE          LABS:                        9.3    10.73 )-----------( 450      ( 05 Dec 2021 17:42 )             29.6     12-05    139  |  101  |  39<H>  ----------------------------<  154<H>  4.8   |  27  |  1.06    Ca    9.4      05 Dec 2021 17:42                  RADIOLOGY & ADDITIONAL TESTS:    Imaging Personally Reviewed:    Consultant(s) Notes Reviewed:      Care Discussed with Consultants/Other Providers: neurosurg

## 2021-12-06 NOTE — PROGRESS NOTE ADULT - PROBLEM SELECTOR PLAN 1
S/p VPS placement on 11/23.  s/p PICA aneurysm resection. s/p EVD clamped & removed.  cont mgt as per neurosurgery

## 2021-12-06 NOTE — PROGRESS NOTE ADULT - THIS PATIENT HAS THE FOLLOWING CONDITION(S)/DIAGNOSES ON THIS ADMISSION:
Microalbumin, foot exam, eye exam up to date. Had low blood sugar yesterday of 50. Had recent gout flare up. None

## 2021-12-06 NOTE — PROGRESS NOTE ADULT - ASSESSMENT
62 yo male with PMH of HTN   Admitted on 11/4 with severe headache and found to have cerebellar bleed.  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  S/p trach and peg.   He has distal DVT's and was off anticoagulation. He has been started on apixaban.  He received cefepime 11/4-11/21 for respiratory coverage.  Developed watery C diff diarrhea on 11/26 - started on oral vancomycin   Was restarted on cefepime on 11/28 for concerns of pneumonia since had low grade temps.  CXR's remained clear.  Ct of brain showed residual blood.  Pseudomonas in sputum found to be resistant to carbapenems & quinolones. Sputum isolated strep pneumoniae as well    11/28 urine and blood cultures are negative at 48 hours.  His trach secretions still have an odor and are moderate.  Diarrhea is improving    Suggest:  Continue enteral vanco for C diff, day 11/14, stools still loose  Stop Cefepime and continue Giorgio nebs for another 24 hrs as outlined earlier   Anticoagulation per NS(apixaban)  Supportive care per NS and medicine  [place] for retention  Supportive care

## 2021-12-06 NOTE — PROGRESS NOTE ADULT - ASSESSMENT
61M hx HTN, DM, cardiac stent on ASA. 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      CV:  -keep sbp 100-140  -labetalol for hypertension  -atorvastatin for hyperlipidemia     Endocrine:   - NPH 10U and JOSE MARTIN for glucose control  - keep fingersticks 100-180    DVT ppx:   - Eliquis 5mg BID, H/H stable. cleared by Dr. Page.   - persistent bilateral below knee dvt, awaiting repeat serial LE dopplers    ID:   - remains afebrile  - Continue PO vanco for c. diff, day 11 of 14.   - Cont Giorgio Nebs for 24 more hours tracheobronchitis   - Discontinued Cefepime per ID  - ID following    GI:    - tolerating tube feeds, Glucerna.     Renal:  - Patient with chronic urinary retention, Barney inserted 11/28, negative UA at the time.  Consider TOV tomorrow    PT/OT:   - [facility] when cleared and accepted.     [employer] # [id number]

## 2021-12-06 NOTE — PROGRESS NOTE ADULT - PROBLEM SELECTOR PLAN 4
fungal-appearing rash in b/l groin with right side extending to lower abd and lateral hip, improved  - keep area dry  - continue nystatin powder

## 2021-12-06 NOTE — PROGRESS NOTE ADULT - SUBJECTIVE AND OBJECTIVE BOX
CC: f/u for C diff & purulent tracheobronchitis    Patient reports nothing     REVIEW OF SYSTEMS:  unobtainable - on trach collar    Antimicrobials Day #  :  cefepime   IVPB 2000 milliGRAM(s) IV Intermittent every 8 hours --- D # 5/5  tobramycin for Nebulization 300 milliGRAM(s) Inhalation every 12 hours D # 6/7  vancomycin    Solution 125 milliGRAM(s) Oral every 6 hours D # 11/14    Other Medications Reviewed    T(F): 99.1 (12-06-21 @ 11:52), Max: 99.3 (12-06-21 @ 04:50)  HR: 85 (12-06-21 @ 11:52)  BP: 110/72 (12-06-21 @ 11:52)  RR: 18 (12-06-21 @ 11:52)i  SpO2: 97% (12-06-21 @ 11:52)  Wt(kg): --    PHYSICAL EXAM:  General: no acute distress, non responsive on trach collar   Eyes:  anicteric, no conjunctival injection, no discharge  Oropharynx: trach + 	  Neck: supple  Lungs: scattered rhonchi  Heart: regular rate and rhythm; no murmurs  Abdomen: soft, nondistended, nontender, peg +  Skin: no lesions  Extremities: no clubbing, cyanosis, or edema  Neurologic: non responsive to verbal/ tactile stimuli     LAB RESULTS:                        9.3    10.73 )-----------( 450      ( 05 Dec 2021 17:42 )             29.6     12-05    139  |  101  |  39<H>  ----------------------------<  154<H>  4.8   |  27  |  1.06    Ca    9.4      05 Dec 2021 17:42          MICROBIOLOGY:  RECENT CULTURES:      RADIOLOGY REVIEWED:

## 2021-12-06 NOTE — PROGRESS NOTE ADULT - SUBJECTIVE AND OBJECTIVE BOX
Vascular Cardiology  Progress note  EMAIL [email or web address]     OFFICE [phone]    INTERVAL HISTORY:  -NAEON.      Allergies  penicillin (Other)    MEDICATIONS:  apixaban 5 milliGRAM(s) Oral every 12 hours  labetalol 100 milliGRAM(s) Oral two times a day  cefepime   IVPB 2000 milliGRAM(s) IV Intermittent every 8 hours  tobramycin for Nebulization 300 milliGRAM(s) Inhalation every 12 hours  vancomycin    Solution 125 milliGRAM(s) Oral every 6 hours  sodium chloride 3%  Inhalation 3 milliLiter(s) Inhalation every 6 hours  acetaminophen    Suspension .. 650 milliGRAM(s) Oral every 6 hours PRN  pantoprazole  Injectable 40 milliGRAM(s) IV Push daily  atorvastatin 40 milliGRAM(s) Oral at bedtime  dextrose 50% Injectable 25 Gram(s) IV Push once  dextrose 50% Injectable 12.5 Gram(s) IV Push once  insulin lispro (ADMELOG) corrective regimen sliding scale   SubCutaneous every 6 hours  insulin NPH human recombinant 12 Unit(s) SubCutaneous every 6 hours  artificial  tears Solution 1 Drop(s) Both EYES every 6 hours  chlorhexidine 0.12% Liquid 15 milliLiter(s) Oral Mucosa two times a day  chlorhexidine 4% Liquid 1 Application(s) Topical <User Schedule>  nystatin Powder 1 Application(s) Topical two times a day    PAST MEDICAL & SURGICAL HISTORY:  HTN (hypertension)  Diabetes mellitus    SOCIAL HISTORY:  unchanged    REVIEW OF SYSTEMS:  -Limited by patient factors 2/2 to non-verbal.  [x] Unable to obtain    PHYSICAL EXAM:  T(C): 37.1 (12-06-21 @ 08:42), Max: 37.4 (12-06-21 @ 04:50)  HR: 84 (12-06-21 @ 08:42) (84 - 96)  BP: 116/71 (12-06-21 @ 08:42) (113/76 - 147/89)  RR: 18 (12-06-21 @ 08:42) (18 - 18)  SpO2: 98% (12-06-21 @ 08:42) (95% - 100%)     Wt(kg): --  I&O's Summary    05 Dec 2021 07:01  -  06 Dec 2021 07:00  --------------------------------------------------------  IN: 1560 mL / OUT: 770 mL / NET: 790 mL    06 Dec 2021 07:01  -  06 Dec 2021 11:28  --------------------------------------------------------  IN: 0 mL / OUT: 100 mL / NET: -100 mL    Appearance: Frail appearing, nonverbal, with trach and PEG  HEENT:   Normal oral mucosa, PERRL, EOMI	  Lymphatic: No lymphadenopathy  Cardiovascular:  S1, S2, RRR, no RMG  Respiratory:  CTA b/l  Psychiatry:  Nonverbal; nods head upon command  Gastrointestinal:  Soft, Non-tender, + BS	  Skin: No rashes, No ecchymoses, No cyanosis	  Extremities:  B/l LE with no edema, asymmetry of girth, skin changes; extremities are warm with no ulcers or wounds, no phlegmasia    Vascular Pulse Exam:  Right DP: [x]palpable []non-palpable []audible      Left DP :   [x]palpable []non-palpable []audible  Right PT: [x]palpable [] non-palpable []audible   Left PT:  [x] palpable [] non-palpable []audible      LABS:	 	    CBC Full  -  ( 05 Dec 2021 17:42 )  WBC Count : 10.73 K/uL  Hemoglobin : 9.3 g/dL  Hematocrit : 29.6 %  Platelet Count - Automated : 450 K/uL  Mean Cell Volume : 94.0 fl  Mean Cell Hemoglobin : 29.5 pg  Mean Cell Hemoglobin Concentration : 31.4 gm/dL  Auto Neutrophil # : x  Auto Lymphocyte # : x  Auto Monocyte # : x  Auto Eosinophil # : x  Auto Basophil # : x  Auto Neutrophil % : x  Auto Lymphocyte % : x  Auto Monocyte % : x  Auto Eosinophil % : x  Auto Basophil % : x    12-05    139  |  101  |  39<H>  ----------------------------<  154<H>  4.8   |  27  |  1.06    Ca    9.4      05 Dec 2021 17:42      Assessment:  1. 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 EVD clamped & removed.  2. C. difficile diarrhea  --PCR positive. likely 2/2 to Abx use  3. Gram-negative pneumonia.   --s/p 7 day course of cefepime on 11/21; was started on levofloxacin for 11/24 sputum cx with pseudomonas  4. Respiratory failure.   --s/p trach. on trach collar  5. B/l below knee DVTs  --Right soleal vein DVT and persistent left posterior tibial, peroneal, gastrocnemius and soleal vein DVT without proximal propagation on last duplex on 11/24    8. CAD   --s/p stent and on DAPT  --ASA to be resumed when cleared by NSG; on statin    Plan:  1. Continue Eliquis 5mg PO BID for b/l below the knee DVTs  2. Watch hemoglobin  3.  Nsx followup  4.  GOC discussions    Thank you,    Please call with any questions:   Service Line: [phone]  Office [phone]  email:  [email or web address]

## 2021-12-06 NOTE — PROGRESS NOTE ADULT - SUBJECTIVE AND OBJECTIVE BOX
SUBJECTIVE:  Patient seen and examined, no acute changes in exam, on trach collar.    OVERNIGHT EVENTS: none    Vital Signs Last 24 Hrs  T(C): 37.5 (06 Dec 2021 16:00), Max: 37.5 (06 Dec 2021 16:00)  T(F): 99.5 (06 Dec 2021 16:00), Max: 99.5 (06 Dec 2021 16:00)  HR: 101 (06 Dec 2021 16:00) (84 - 101)  BP: 112/76 (06 Dec 2021 16:00) (110/72 - 125/76)  BP(mean): --  RR: 18 (06 Dec 2021 16:00) (18 - 18)  SpO2: 97% (06 Dec 2021 16:00) (97% - 100%)    PHYSICAL EXAM:    General: No Acute Distress     Neurological: ON TRACH COLLAR, Alert OE, non verbal, uppers wds, lowers trace movement, follow commands wiggles toes.   Pulmonary: +Secretions from trach, Clear to Auscultation, No Rales, No Rhonchi, No Wheezes   Cardiovascular:  Regular Rate and Rhythm   Gastrointestinal: Soft, Nontender, Nondistended   Incision:     LABS:                          9.3    10.73 )-----------( 450      ( 05 Dec 2021 17:42 )             29.6     12-05    139  |  101  |  39<H>  ----------------------------<  154<H>  4.8   |  27  |  1.06    Ca    9.4      05 Dec 2021 17:42        DRAINS:     MEDICATIONS:  Antibiotics:  cefepime   IVPB 2000 milliGRAM(s) IV Intermittent three times a day  tobramycin for Nebulization 300 milliGRAM(s) Inhalation every 12 hours  vancomycin    Solution 125 milliGRAM(s) Oral every 6 hours    Neuro:  acetaminophen    Suspension .. 650 milliGRAM(s) Oral every 6 hours PRN Temp greater or equal to 38C (100.4F), Mild Pain (1 - 3)    Cardiac:  labetalol 100 milliGRAM(s) Oral two times a day    Pulm:  sodium chloride 3%  Inhalation 3 milliLiter(s) Inhalation every 6 hours    GI/:    Other:   apixaban 5 milliGRAM(s) Oral every 12 hours  artificial  tears Solution 1 Drop(s) Both EYES every 6 hours  atorvastatin 40 milliGRAM(s) Oral at bedtime  chlorhexidine 0.12% Liquid 15 milliLiter(s) Oral Mucosa two times a day  chlorhexidine 4% Liquid 1 Application(s) Topical <User Schedule>  dextrose 50% Injectable 25 Gram(s) IV Push once  dextrose 50% Injectable 12.5 Gram(s) IV Push once  insulin lispro (ADMELOG) corrective regimen sliding scale   SubCutaneous every 6 hours  insulin NPH human recombinant 10 Unit(s) SubCutaneous every 6 hours  nystatin Powder 1 Application(s) Topical two times a day

## 2021-12-07 DIAGNOSIS — R50.9 FEVER, UNSPECIFIED: ICD-10-CM

## 2021-12-07 DIAGNOSIS — I95.9 HYPOTENSION, UNSPECIFIED: ICD-10-CM

## 2021-12-07 DIAGNOSIS — R40.0 SOMNOLENCE: ICD-10-CM

## 2021-12-07 DIAGNOSIS — N17.9 ACUTE KIDNEY FAILURE, UNSPECIFIED: ICD-10-CM

## 2021-12-07 LAB
ANION GAP SERPL CALC-SCNC: 13 MMOL/L — SIGNIFICANT CHANGE UP (ref 5–17)
APPEARANCE CSF: CLEAR — SIGNIFICANT CHANGE UP
APPEARANCE SPUN FLD: ABNORMAL
APTT BLD: 29.8 SEC — SIGNIFICANT CHANGE UP (ref 27.5–35.5)
BASOPHILS # BLD AUTO: 0.03 K/UL — SIGNIFICANT CHANGE UP (ref 0–0.2)
BASOPHILS NFR BLD AUTO: 0.3 % — SIGNIFICANT CHANGE UP (ref 0–2)
BLD GP AB SCN SERPL QL: NEGATIVE — SIGNIFICANT CHANGE UP
BUN SERPL-MCNC: 67 MG/DL — HIGH (ref 7–23)
CALCIUM SERPL-MCNC: 9.2 MG/DL — SIGNIFICANT CHANGE UP (ref 8.4–10.5)
CHLORIDE SERPL-SCNC: 101 MMOL/L — SIGNIFICANT CHANGE UP (ref 96–108)
CO2 SERPL-SCNC: 24 MMOL/L — SIGNIFICANT CHANGE UP (ref 22–31)
COLOR CSF: YELLOW
CREAT SERPL-MCNC: 1.43 MG/DL — HIGH (ref 0.5–1.3)
EOSINOPHIL # BLD AUTO: 0.29 K/UL — SIGNIFICANT CHANGE UP (ref 0–0.5)
EOSINOPHIL # CSF: 11 % — SIGNIFICANT CHANGE UP
EOSINOPHIL NFR BLD AUTO: 2.6 % — SIGNIFICANT CHANGE UP (ref 0–6)
GLUCOSE CSF-MCNC: 61 MG/DL — SIGNIFICANT CHANGE UP (ref 40–70)
GLUCOSE SERPL-MCNC: 191 MG/DL — HIGH (ref 70–99)
GRAM STN FLD: SIGNIFICANT CHANGE UP
HCT VFR BLD CALC: 27.5 % — LOW (ref 39–50)
HGB BLD-MCNC: 8.6 G/DL — LOW (ref 13–17)
IMM GRANULOCYTES NFR BLD AUTO: 2 % — HIGH (ref 0–1.5)
INR BLD: 1.37 RATIO — HIGH (ref 0.88–1.16)
LDH CSF L TO P-CCNC: 155 U/L — SIGNIFICANT CHANGE UP
LDH FLD-CCNC: 155 U/L — SIGNIFICANT CHANGE UP
LYMPHOCYTES # BLD AUTO: 1.77 K/UL — SIGNIFICANT CHANGE UP (ref 1–3.3)
LYMPHOCYTES # BLD AUTO: 16.1 % — SIGNIFICANT CHANGE UP (ref 13–44)
LYMPHOCYTES # CSF: 41 % — SIGNIFICANT CHANGE UP (ref 40–80)
MCHC RBC-ENTMCNC: 29.7 PG — SIGNIFICANT CHANGE UP (ref 27–34)
MCHC RBC-ENTMCNC: 31.3 GM/DL — LOW (ref 32–36)
MCV RBC AUTO: 94.8 FL — SIGNIFICANT CHANGE UP (ref 80–100)
MONOCYTES # BLD AUTO: 1.37 K/UL — HIGH (ref 0–0.9)
MONOCYTES NFR BLD AUTO: 12.5 % — SIGNIFICANT CHANGE UP (ref 2–14)
MONOS+MACROS NFR CSF: 34 % — SIGNIFICANT CHANGE UP (ref 15–45)
NEUTROPHILS # BLD AUTO: 7.28 K/UL — SIGNIFICANT CHANGE UP (ref 1.8–7.4)
NEUTROPHILS # CSF: 14 % — HIGH (ref 0–6)
NEUTROPHILS NFR BLD AUTO: 66.5 % — SIGNIFICANT CHANGE UP (ref 43–77)
NRBC # BLD: 0 /100 WBCS — SIGNIFICANT CHANGE UP (ref 0–0)
NRBC NFR CSF: 62 /UL — HIGH (ref 0–5)
PHOSPHATE SERPL-MCNC: 5.3 MG/DL — HIGH (ref 2.5–4.5)
PLATELET # BLD AUTO: 446 K/UL — HIGH (ref 150–400)
POTASSIUM SERPL-MCNC: 4.3 MMOL/L — SIGNIFICANT CHANGE UP (ref 3.5–5.3)
POTASSIUM SERPL-SCNC: 4.3 MMOL/L — SIGNIFICANT CHANGE UP (ref 3.5–5.3)
PROT CSF-MCNC: 384 MG/DL — HIGH (ref 15–45)
PROTHROM AB SERPL-ACNC: 16.2 SEC — HIGH (ref 10.6–13.6)
RBC # BLD: 2.9 M/UL — LOW (ref 4.2–5.8)
RBC # CSF: 325 /UL — HIGH (ref 0–0)
RBC # FLD: 13.6 % — SIGNIFICANT CHANGE UP (ref 10.3–14.5)
RH IG SCN BLD-IMP: POSITIVE — SIGNIFICANT CHANGE UP
SODIUM SERPL-SCNC: 138 MMOL/L — SIGNIFICANT CHANGE UP (ref 135–145)
SPECIMEN SOURCE: SIGNIFICANT CHANGE UP
TUBE TYPE: SIGNIFICANT CHANGE UP
WBC # BLD: 10.96 K/UL — HIGH (ref 3.8–10.5)
WBC # FLD AUTO: 10.96 K/UL — HIGH (ref 3.8–10.5)

## 2021-12-07 PROCEDURE — 99233 SBSQ HOSP IP/OBS HIGH 50: CPT

## 2021-12-07 PROCEDURE — 99291 CRITICAL CARE FIRST HOUR: CPT

## 2021-12-07 PROCEDURE — 99233 SBSQ HOSP IP/OBS HIGH 50: CPT | Mod: 25

## 2021-12-07 PROCEDURE — 70450 CT HEAD/BRAIN W/O DYE: CPT | Mod: 26,59

## 2021-12-07 RX ORDER — SODIUM CHLORIDE 9 MG/ML
500 INJECTION INTRAMUSCULAR; INTRAVENOUS; SUBCUTANEOUS ONCE
Refills: 0 | Status: COMPLETED | OUTPATIENT
Start: 2021-12-07 | End: 2021-12-07

## 2021-12-07 RX ORDER — SODIUM CHLORIDE 9 MG/ML
1000 INJECTION INTRAMUSCULAR; INTRAVENOUS; SUBCUTANEOUS
Refills: 0 | Status: DISCONTINUED | OUTPATIENT
Start: 2021-12-07 | End: 2021-12-09

## 2021-12-07 RX ADMIN — Medication 125 MILLIGRAM(S): at 05:51

## 2021-12-07 RX ADMIN — SODIUM CHLORIDE 1000 MILLILITER(S): 9 INJECTION INTRAMUSCULAR; INTRAVENOUS; SUBCUTANEOUS at 18:40

## 2021-12-07 RX ADMIN — CHLORHEXIDINE GLUCONATE 15 MILLILITER(S): 213 SOLUTION TOPICAL at 05:51

## 2021-12-07 RX ADMIN — APIXABAN 5 MILLIGRAM(S): 2.5 TABLET, FILM COATED ORAL at 05:53

## 2021-12-07 RX ADMIN — SODIUM CHLORIDE 3 MILLILITER(S): 9 INJECTION INTRAMUSCULAR; INTRAVENOUS; SUBCUTANEOUS at 23:36

## 2021-12-07 RX ADMIN — NYSTATIN CREAM 1 APPLICATION(S): 100000 CREAM TOPICAL at 18:03

## 2021-12-07 RX ADMIN — HUMAN INSULIN 12 UNIT(S): 100 INJECTION, SUSPENSION SUBCUTANEOUS at 00:28

## 2021-12-07 RX ADMIN — Medication 100 MILLIGRAM(S): at 05:51

## 2021-12-07 RX ADMIN — HUMAN INSULIN 12 UNIT(S): 100 INJECTION, SUSPENSION SUBCUTANEOUS at 12:14

## 2021-12-07 RX ADMIN — PANTOPRAZOLE SODIUM 40 MILLIGRAM(S): 20 TABLET, DELAYED RELEASE ORAL at 12:39

## 2021-12-07 RX ADMIN — Medication 1 DROP(S): at 00:27

## 2021-12-07 RX ADMIN — SODIUM CHLORIDE 3 MILLILITER(S): 9 INJECTION INTRAMUSCULAR; INTRAVENOUS; SUBCUTANEOUS at 00:30

## 2021-12-07 RX ADMIN — Medication 125 MILLIGRAM(S): at 12:14

## 2021-12-07 RX ADMIN — Medication 300 MILLIGRAM(S): at 05:50

## 2021-12-07 RX ADMIN — SODIUM CHLORIDE 3000 MILLILITER(S): 9 INJECTION INTRAMUSCULAR; INTRAVENOUS; SUBCUTANEOUS at 21:30

## 2021-12-07 RX ADMIN — HUMAN INSULIN 12 UNIT(S): 100 INJECTION, SUSPENSION SUBCUTANEOUS at 06:15

## 2021-12-07 RX ADMIN — Medication 1 DROP(S): at 12:12

## 2021-12-07 RX ADMIN — Medication 125 MILLIGRAM(S): at 00:16

## 2021-12-07 RX ADMIN — ATORVASTATIN CALCIUM 40 MILLIGRAM(S): 80 TABLET, FILM COATED ORAL at 21:35

## 2021-12-07 RX ADMIN — SODIUM CHLORIDE 3 MILLILITER(S): 9 INJECTION INTRAMUSCULAR; INTRAVENOUS; SUBCUTANEOUS at 18:19

## 2021-12-07 RX ADMIN — Medication 1 DROP(S): at 23:52

## 2021-12-07 RX ADMIN — CHLORHEXIDINE GLUCONATE 1 APPLICATION(S): 213 SOLUTION TOPICAL at 21:19

## 2021-12-07 RX ADMIN — Medication 125 MILLIGRAM(S): at 17:50

## 2021-12-07 RX ADMIN — SODIUM CHLORIDE 2000 MILLILITER(S): 9 INJECTION INTRAMUSCULAR; INTRAVENOUS; SUBCUTANEOUS at 09:36

## 2021-12-07 RX ADMIN — Medication 1 DROP(S): at 05:48

## 2021-12-07 RX ADMIN — SODIUM CHLORIDE 3 MILLILITER(S): 9 INJECTION INTRAMUSCULAR; INTRAVENOUS; SUBCUTANEOUS at 12:39

## 2021-12-07 RX ADMIN — Medication 125 MILLIGRAM(S): at 23:54

## 2021-12-07 RX ADMIN — Medication 2: at 06:14

## 2021-12-07 RX ADMIN — CHLORHEXIDINE GLUCONATE 15 MILLILITER(S): 213 SOLUTION TOPICAL at 17:50

## 2021-12-07 RX ADMIN — Medication 1 DROP(S): at 17:50

## 2021-12-07 RX ADMIN — SODIUM CHLORIDE 75 MILLILITER(S): 9 INJECTION INTRAMUSCULAR; INTRAVENOUS; SUBCUTANEOUS at 20:08

## 2021-12-07 RX ADMIN — NYSTATIN CREAM 1 APPLICATION(S): 100000 CREAM TOPICAL at 05:49

## 2021-12-07 RX ADMIN — SODIUM CHLORIDE 75 MILLILITER(S): 9 INJECTION INTRAMUSCULAR; INTRAVENOUS; SUBCUTANEOUS at 17:50

## 2021-12-07 NOTE — PROGRESS NOTE ADULT - ATTENDING COMMENTS
seen, examined and case d/w fellow  monitor for symptomatic hydrocephalus requiring emergent cerebrospinal fluid diversion

## 2021-12-07 NOTE — PROCEDURE NOTE - ATTENDING PROVIDER
Colon and Rectal Surgery Note         Assessment and Plan:   POD#2 s/p ex lap, transverse colectomy, reapair gastro-enteric-colonic fistula ( Dr. Harding).  Afebrile. Awaiting return of bowel function.   - Cont NPO, NGT, IVFs, TPN per gen surgery.  - Lovenox, SCDs for DVT ppx  - OOB and ambulation     Jaci Peraza MD  [facility]  Phone: [phone]  Fax: [phone]        Interval History:   No issues overnight. Denies n/v/f/c.  No flatus or BM.           Physical Exam:     Temp:  [98.1  F (36.7  C)-98.5  F (36.9  C)] 98.4  F (36.9  C)  Pulse:  [66-87] 87  Heart Rate:  [75-90] 90  Resp:  [16-18] 18  BP: (119-149)/(75-96) 134/93  SpO2:  [97 %-98 %] 97 %    General:  Pleasant, alert.  In no acute distress.  Abdomen:  Soft, mild distention, minimal ttp per incision.  Wound clean/dry/intact.          Data:     I/O last 3 completed shifts:  In: 2214 [I.V.:1045; NG/GT:30]  Out: 2225 [Urine:1800; Emesis/NG output:425]    Lab Results   Component Value Date    WBC 8.9 09/22/2018    WBC 8.3 09/17/2018      Lab Results   Component Value Date    HGB 10.4 09/22/2018    HGB 11.5 09/21/2018       Lab Results   Component Value Date     09/23/2018      Lab Results   Component Value Date    POTASSIUM 3.6 09/23/2018     Lab Results   Component Value Date    CHLORIDE 100 09/23/2018     Lab Results   Component Value Date    CO2 30 09/23/2018     Lab Results   Component Value Date     09/23/2018       Lab Results   Component Value Date    BUN 9 09/23/2018     Lab Results   Component Value Date    CR 0.48 09/23/2018     Lab Results   Component Value Date    LANCE 7.7 09/23/2018          Mily Peraza MD            
Page
Andrew Page
Andrew Page
Rasheed Garrett MD

## 2021-12-07 NOTE — PROGRESS NOTE ADULT - PROBLEM SELECTOR PLAN 3
resolved. On inhaled Tobramycin iv Cefepime. Pseudomonas and Strep Pneumoniae which grew from Sputum  ID is on the case   cont to monitor SPO2 and cont resp care S/p VPS placement on 11/23.  s/p PICA aneurysm resection. s/p EVD clamped & removed.  cont mgt as per neurosurgery found to be in urinary retention s/p Foly. monitor I/Os and creat.

## 2021-12-07 NOTE — PROGRESS NOTE ADULT - SUBJECTIVE AND OBJECTIVE BOX
SURGERY DAILY PROGRESS NOTE:     SUBJECTIVE/ROS: Plan fro  shunt revision tomorrow. ACS consulted for intraop.      MEDICATIONS  (STANDING):  artificial  tears Solution 1 Drop(s) Both EYES every 6 hours  atorvastatin 40 milliGRAM(s) Oral at bedtime  chlorhexidine 0.12% Liquid 15 milliLiter(s) Oral Mucosa two times a day  chlorhexidine 4% Liquid 1 Application(s) Topical <User Schedule>  dextrose 50% Injectable 25 Gram(s) IV Push once  dextrose 50% Injectable 12.5 Gram(s) IV Push once  insulin lispro (ADMELOG) corrective regimen sliding scale   SubCutaneous every 6 hours  insulin NPH human recombinant 12 Unit(s) SubCutaneous every 6 hours  nystatin Powder 1 Application(s) Topical two times a day  pantoprazole  Injectable 40 milliGRAM(s) IV Push daily  sodium chloride 0.9%. 1000 milliLiter(s) (75 mL/Hr) IV Continuous <Continuous>  sodium chloride 3%  Inhalation 3 milliLiter(s) Inhalation every 6 hours  vancomycin    Solution 125 milliGRAM(s) Oral every 6 hours    MEDICATIONS  (PRN):  acetaminophen    Suspension .. 650 milliGRAM(s) Oral every 6 hours PRN Temp greater or equal to 38C (100.4F), Mild Pain (1 - 3)      OBJECTIVE:    Vital Signs Last 24 Hrs  T(C): 36.2 (07 Dec 2021 16:15), Max: 38.9 (06 Dec 2021 17:54)  T(F): 97.2 (07 Dec 2021 16:15), Max: 102 (06 Dec 2021 17:54)  HR: 93 (07 Dec 2021 16:15) (88 - 95)  BP: 125/63 (07 Dec 2021 16:15) (94/60 - 125/63)  BP(mean): 75 (07 Dec 2021 16:15) (75 - 75)  RR: 17 (07 Dec 2021 16:15) (17 - 18)  SpO2: 100% (07 Dec 2021 16:15) (95% - 100%)    I&O's Detail    06 Dec 2021 07:01  -  07 Dec 2021 07:00  --------------------------------------------------------  IN:    Enteral Tube Flush: 60 mL    Free Water: 500 mL    Glucerna: 1040 mL  Total IN: 1600 mL    OUT:    Indwelling Catheter - Urethral (mL): 100 mL    Intermittent Catheterization - Urethral (mL): 1000 mL  Total OUT: 1100 mL    Total NET: 500 mL      07 Dec 2021 07:01  -  07 Dec 2021 17:46  --------------------------------------------------------  IN:    sodium chloride 0.9%: 150 mL  Total IN: 150 mL    OUT:    Indwelling Catheter - Urethral (mL): 295 mL  Total OUT: 295 mL    Total NET: -145 mL          Daily     Daily     LABS:                        8.6    10.96 )-----------( 446      ( 07 Dec 2021 06:20 )             27.5     12-07    138  |  101  |  67<H>  ----------------------------<  191<H>  4.3   |  24  |  1.43<H>    Ca    9.2      07 Dec 2021 06:20  Phos  5.3     12-07    TPro  6.6  /  Alb  3.0<L>  /  TBili  0.2  /  DBili  <0.1  /  AST  49<H>  /  ALT  62<H>  /  AlkPhos  122<H>  12-07    PT/INR - ( 07 Dec 2021 15:29 )   PT: 16.2 sec;   INR: 1.37 ratio         PTT - ( 07 Dec 2021 15:29 )  PTT:29.8 sec  Urinalysis Basic - ( 06 Dec 2021 18:37 )    Color: Yellow / Appearance: Slightly Turbid / S.023 / pH: x  Gluc: x / Ketone: Trace  / Bili: Negative / Urobili: Negative   Blood: x / Protein: 100 mg/dL / Nitrite: Negative   Leuk Esterase: Negative / RBC: 112 /hpf / WBC 3 /HPF   Sq Epi: x / Non Sq Epi: 2 /hpf / Bacteria: Negative        PHYSICAL EXAM:  NECK: trach in place  HEART: Regular rate and rhythm  RESPIRATORY: nonlabored breathing  ABDOMEN: Soft, Nontender, PEG in place, incision c/d/i     Plan:    - plan for  shunt revision tomorrow, add on  - please call ACS surgery in AM for confirmation of case add on time  - excellent care per NSICU    ACS, [id number]

## 2021-12-07 NOTE — PROGRESS NOTE ADULT - PROBLEM SELECTOR PLAN 9
HbA1c 6.5%  -CW NPH to 12 units q6h  - monitor FS q6h  - so far fasting glucose is <200 Continue labetalol 100mg BID with hold parameters  - monitor vitals right soleal vein DVT and persistent left posterior tibial, peroneal, gastrocnemius and soleal vein DVT without proximal propagation on last duplex on 11/24  - started on Eliquis 12/2--> monitor for any bleed   - Vasc team is on the case

## 2021-12-07 NOTE — PROGRESS NOTE ADULT - ASSESSMENT
61 M self decannulated likely 2/2 forceful cough. #6 uncuffed Shiley changed to cuffed for OR procedure in am not inflated. velcro strap on trach collar

## 2021-12-07 NOTE — PROGRESS NOTE ADULT - PROBLEM SELECTOR PLAN 4
fungal-appearing rash in b/l groin with right side extending to lower abd and lateral hip, improved  - keep area dry  - continue nystatin powder Continue PO Vanco for 14 days total as per ID   Diarrhea improved. Rectal tube has been removed.  one stool on 12/4 recorded , but nurse states that pt had loose stool on 12/5   cont to monitor resolved c IVF. monitor closely. if recurs, will need to re-bolus and may need to start empiric abx

## 2021-12-07 NOTE — PROGRESS NOTE ADULT - PROBLEM SELECTOR PLAN 12
DVT ppx: hep subc HbA1c 6.5%  -CW NPH to 12 units q6h  - monitor FS q6h  - so far fasting glucose is <200

## 2021-12-07 NOTE — PROGRESS NOTE ADULT - SUBJECTIVE AND OBJECTIVE BOX
CC: f/u for fever    Patient reports nothing. Nurse reports no BM so far for today     REVIEW OF SYSTEMS:  Unobtainable - non verbal     Antimicrobials Day #  :   vancomycin    Solution 125 milliGRAM(s) Oral every 6 hours    Other Medications Reviewed    T(F): 97.9 (21 @ 11:52), Max: 102 (21 @ 17:54)  HR: 88 (21 @ 11:52)  BP: 122/69 (21 @ 11:52)  RR: 18 (21 @ 11:52)  SpO2: 95% (21 @ 11:52)  Wt(kg): --    PHYSICAL EXAM:  General: no acute distress, non responsive on trach collar   Eyes:  anicteric, no conjunctival injection, no discharge  Oropharynx: trach + 	  Neck: supple  Lungs: scattered rhonchi  Heart: regular rate and rhythm; no murmurs  Abdomen: soft, nondistended, nontender, peg +  Skin: no lesions  Extremities: no clubbing, cyanosis, or edema  Neurologic: non responsive to verbal/ tactile stimuli     LAB RESULTS:                        8.6    10.96 )-----------( 446      ( 07 Dec 2021 06:20 )             27.5     12    138  |  101  |  67<H>  ----------------------------<  191<H>  4.3   |  24  |  1.43<H>    Ca    9.2      07 Dec 2021 06:20  Phos  5.3         TPro  6.6  /  Alb  3.0<L>  /  TBili  0.2  /  DBili  <0.1  /  AST  49<H>  /  ALT  62<H>  /  AlkPhos  122<H>      LIVER FUNCTIONS - ( 07 Dec 2021 06:20 )  Alb: 3.0 g/dL / Pro: 6.6 g/dL / ALK PHOS: 122 U/L / ALT: 62 U/L / AST: 49 U/L / GGT: x           Urinalysis Basic - ( 06 Dec 2021 18:37 )    Color: Yellow / Appearance: Slightly Turbid / S.023 / pH: x  Gluc: x / Ketone: Trace  / Bili: Negative / Urobili: Negative   Blood: x / Protein: 100 mg/dL / Nitrite: Negative   Leuk Esterase: Negative / RBC: 112 /hpf / WBC 3 /HPF   Sq Epi: x / Non Sq Epi: 2 /hpf / Bacteria: Negative      MICROBIOLOGY:  RECENT CULTURES:        RADIOLOGY REVIEWED:

## 2021-12-07 NOTE — PROCEDURE NOTE - ADDITIONAL PROCEDURE DETAILS
The reservoir of the Right parietal shunt was palpated. A 23-gauge butterfly needle was inserted into the reservoir. No significant amount of fluid could be aspirated. The reservoir was depressed but did not briskly refill.
Patient was prepped and draped in usual sterile fashion. 25ccs of fluid were aspirated from suboccipital pseudomeningocele and sent to lab.

## 2021-12-07 NOTE — PROGRESS NOTE ADULT - PROBLEM SELECTOR PLAN 2
----- Message from Aiyana Whitaker MD sent at 3/11/2019 11:55 AM CDT -----  No concerns with x-rays of the hips. Continue PO Vanco for 14 days total as per ID   Diarrhea improved. Rectal tube has been removed.  one stool on 12/4 recorded , but nurse states that pt had loose stool on 12/5   cont to monitor checking CT head. may need shunt adjustment.

## 2021-12-07 NOTE — PROGRESS NOTE ADULT - ASSESSMENT
61 year old man with respiratory failure d/t ICH    1. ICH  -per neurosurgery, s/p EVD  -for  shunt    2. Respiratory failure  -for tracheostomy, will require long term enteral nutrition  -s/p PEG, tolerating feeds  - aspiration precautions     3. Enterobacter PNA  -s/p course of antibiotics     4. afib with RVR    5. Anemia, no overt GI bleed. EGD unremarkable.  -trend CBC    6. Cdiff infection on PO vanco  -add banatrol for possible component of tube feeds contributing to diarrhea    7. DVT on a/c  -would give PPI    [facility]  Gastroenterology and Hepatology  [address] [place], [place]  Office: [phone]  Cell: [phone]

## 2021-12-07 NOTE — PROGRESS NOTE ADULT - PROBLEM SELECTOR PLAN 11
DVT ppx: hep subc s/p PEG placement  - continue tube feeds Continue labetalol 100mg BID with hold parameters  - monitor vitals

## 2021-12-07 NOTE — PROGRESS NOTE ADULT - PROBLEM SELECTOR PLAN 6
right soleal vein DVT and persistent left posterior tibial, peroneal, gastrocnemius and soleal vein DVT without proximal propagation on last duplex on 11/24  - started on Eliquis 12/2--> monitor for any bleed   - Vasc team is on the case s/p trach. on trach collar  - continue trach care and suctioning  - monitor O2 sats Continue PO Vanco for 14 days total as per ID   Diarrhea improved. Rectal tube has been removed.  one stool on 12/4 recorded , but nurse states that pt had loose stool on 12/5   cont to monitor

## 2021-12-07 NOTE — PROVIDER CONTACT NOTE (CHANGE IN STATUS NOTIFICATION) - SITUATION
Pt has a change in neuro exam. Pt reportedly withdrawing on MELANIE UE as per night nurse . Pt is now flexing on MELANIE UE.
As per report from 4 Ambrosio RN, pt pupils were 2, round, sluggish. Upon admission to NSCU, pt pupils are 3, round, fixed.
was told on report pt is no effort x4 and pupils 3RB on right and 3RS on left, pt is no movement x4, Left pupil 3RF and right 3RS

## 2021-12-07 NOTE — PROGRESS NOTE ADULT - SUBJECTIVE AND OBJECTIVE BOX
Vascular Cardiology  Progress note  EMAIL [email or web address]     OFFICE [phone]    INTERVAL HISTORY:  -Patient continues to be non-verbal.     MEDICATIONS:  apixaban 5 milliGRAM(s) Oral every 12 hours  vancomycin    Solution 125 milliGRAM(s) Oral every 6 hours  sodium chloride 3%  Inhalation 3 milliLiter(s) Inhalation every 6 hours  acetaminophen    Suspension .. 650 milliGRAM(s) Oral every 6 hours PRN  pantoprazole  Injectable 40 milliGRAM(s) IV Push daily  atorvastatin 40 milliGRAM(s) Oral at bedtime  dextrose 50% Injectable 25 Gram(s) IV Push once  dextrose 50% Injectable 12.5 Gram(s) IV Push once  insulin lispro (ADMELOG) corrective regimen sliding scale   SubCutaneous every 6 hours  insulin NPH human recombinant 12 Unit(s) SubCutaneous every 6 hours  artificial  tears Solution 1 Drop(s) Both EYES every 6 ours  chlorhexidine 0.12% Liquid 15 milliLiter(s) Oral Mucosa two times a day  chlorhexidine 4% Liquid 1 Application(s) Topical <User Schedule>  nystatin Powder 1 Application(s) Topical two times a day    PAST MEDICAL & SURGICAL HISTORY:  HTN (hypertension)  Diabetes mellitus    FAMILY HISTORY:      SOCIAL HISTORY:  unchanged    REVIEW OF SYSTEMS:  -Limited by patient factors; patient is non-verbal  [x] Unable to obtain    PHYSICAL EXAM:  T(C): 36.9 (12-07-21 @ 08:24), Max: 38.9 (12-06-21 @ 17:54)  HR: 88 (12-07-21 @ 08:24) (85 - 101)  BP: 94/60 (12-07-21 @ 08:24) (94/60 - 121/74)  RR: 17 (12-07-21 @ 08:24) (17 - 18)  SpO2: 96% (12-07-21 @ 08:24) (96% - 99%)    Wt(kg): --  I&O's Summary    06 Dec 2021 07:01  -  07 Dec 2021 07:00  --------------------------------------------------------  IN: 1600 mL / OUT: 1100 mL / NET: 500 mL    Appearance: Frail appearing, nonverbal, with trach and PEG  HEENT:   Normal oral mucosa, PERRL, EOMI	  Lymphatic: No lymphadenopathy  Cardiovascular:  S1, S2, RRR, no RMG  Respiratory:  CTA b/l  Psychiatry:  Nonverbal; nods head upon command  Gastrointestinal:  Soft, Non-tender, + BS	  Skin: No rashes, No ecchymoses, No cyanosis	  Extremities:  B/l LE with no edema, asymmetry of girth, skin changes; extremities are warm with no ulcers or wounds, no phlegmasia    Vascular Pulse Exam:  Right DP: [x]palpable []non-palpable []audible      Left DP :   [x]palpable []non-palpable []audible  Right PT: [x]palpable [] non-palpable []audible   Left PT:  [x] palpable [] non-palpable []audible      LABS:	 	    CBC Full  -  ( 07 Dec 2021 06:20 )  WBC Count : 10.96 K/uL  Hemoglobin : 8.6 g/dL  Hematocrit : 27.5 %  Platelet Count - Automated : 446 K/uL  Mean Cell Volume : 94.8 fl  Mean Cell Hemoglobin : 29.7 pg  Mean Cell Hemoglobin Concentration : 31.3 gm/dL  Auto Neutrophil # : 7.28 K/uL  Auto Lymphocyte # : 1.77 K/uL  Auto Monocyte # : 1.37 K/uL  Auto Eosinophil # : 0.29 K/uL  Auto Basophil # : 0.03 K/uL  Auto Neutrophil % : 66.5 %  Auto Lymphocyte % : 16.1 %  Auto Monocyte % : 12.5 %  Auto Eosinophil % : 2.6 %  Auto Basophil % : 0.3 %    12-07    138  |  101  |  67<H>  ----------------------------<  191<H>  4.3   |  24  |  1.43<H>  12-05    139  |  101  |  39<H>  ----------------------------<  154<H>  4.8   |  27  |  1.06    Ca    9.2      07 Dec 2021 06:20  Ca    9.4      05 Dec 2021 17:42  Phos  5.3     12-07      Assessment:  1. 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 EVD clamped & removed.  2. C. difficile diarrhea  --PCR positive. likely 2/2 to Abx use  3. Gram-negative pneumonia.   --s/p 7 day course of cefepime on 11/21; was started on levofloxacin for 11/24 sputum cx with pseudomonas  4. Respiratory failure.   --s/p trach. on trach collar  5. B/l below knee DVTs  --Right soleal vein DVT and persistent left posterior tibial, peroneal, gastrocnemius and soleal vein DVT without proximal propagation on last duplex on 11/24    8. CAD   --s/p stent and on DAPT  --ASA to be resumed when cleared by NSG; on statin    Plan:  1. Continue Eliquis 5mg PO BID for b/l below the knee DVTs  2. Watch hemoglobin  3.  Nsx followup  4.  [facility] discussions    Thank you,    Please call with any questions:   Service Line: [phone]  Office [phone]  email:  [email or web address]

## 2021-12-07 NOTE — PROVIDER CONTACT NOTE (CHANGE IN STATUS NOTIFICATION) - BACKGROUND
s/p SO crani, IVH
Admitted for posterior fossa hemorrhage complicated by severe IVH.
Admitted for posterior fossa hemorrhage w/ ICH and hydro.

## 2021-12-07 NOTE — PROGRESS NOTE ADULT - ASSESSMENT
HPI:  61M hx HTN, DM, cardiac stent on ASA. 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 on 11/5/2021, s/p cerebral angiogram showing left PICA fistula on 11/9/2021, s/p crani for PICA aneurysm resection on 11/10/2021, s/p trach/PEG on 11/19/21, s/p insertion of right parietal ventriculoperitoneal shunt (Certas @ 4) on 11/23/2021      PLAN:  Neuro:   - q4 hour neuro checks  - tylenol for pain control and fevers  - out of bed with assistance  - pt/ot - subacute rehab upon discharge  - VPS Certas @4  - ct head today showed moderate ventricular enlargement -shunt to be tapped   Respiratory:   - on trach collar  - Trach 6 josi uncuffed  - suction prn- moderate amount of secretions  - continue 3% sodium chloride inhalation  - ID following for Tracheobronchitis.      CV:  -keep sbp 100-140  -labetalol for hypertension  -atorvastatin for hyperlipidemia     Endocrine:   - NPH 12U and JOSE MARTIN for glucose control  - keep fingersticks 100-180    DVT ppx:   - Eliquis 5mg BID, H/H stable. cleared by Dr. Page.   - persistent bilateral below knee dvt, awaiting repeat doppler on 12/10     ID:   - febrile on 12/6 pan cx done - id to see   - Continue PO vanco for c. diff, day 12 of 14.   - ID following    GI:    - tolerating tube feeds, Glucerna.   -diarrhea improved   Renal:  - Patient with chronic urinary retention, Barney inserted 12/7,  -diane from likley retention -f/u in am   PT/OT:   - CHUYITA when cleared and accepted.     [facility] # [id number]

## 2021-12-07 NOTE — PROGRESS NOTE ADULT - SUBJECTIVE AND OBJECTIVE BOX
SUBJECTIVE: 60 yo male with PMH of HTN, CAD s/p stent on DAPT, T2DM who complained of headache and subsequently become unresponsive. 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 s/p EVD clamp & removal.    NSCU course also c/b respiratory failure and dysphagia now s/p trach and PEG on 11/19. S/p Certas@4 VPS placement on 11/23. Completed course of cefepime for enterobacter and pseudomonas pneumonia on 11/21. Transferred out of NSCU on 11/25. Began to spike fevers with watery output from rectal tube found to be positive for c. diff, PO vanco started on 11/26.  to finish in 2 days.   Persistent fever, increased secretions, sputum colonized by Pseudomonas, Cefepime restarted on 11/28 and stopped 12/6.     Today pt w fevers, sent UA(neg) and placed teixeira. Poor exam w downward gaze, CTH w enlarging ventricles, shunt depressing but does not refill. Developing pseudomeningocele w VPS malfunction. VPS reservoir and pseudomeningocele were tapped, 25 cc out yellowish in color. Moved to ICU w plan to do  shunt revision but pt on eliquis, plan for OR tomorrow. Developing MICHELLE    Vital Signs Last 24 Hrs  T(C): 36.2 (12-07-21 @ 16:15), Max: 38.9 (12-06-21 @ 17:54)  T(F): 97.2 (12-07-21 @ 16:15), Max: 102 (12-06-21 @ 17:54)  HR: 93 (12-07-21 @ 16:15) (88 - 95)  BP: 125/63 (12-07-21 @ 16:15) (94/60 - 125/63)  BP(mean): 75 (12-07-21 @ 16:15) (75 - 75)  RR: 17 (12-07-21 @ 16:15) (17 - 18)  SpO2: 100% (12-07-21 @ 16:15) (95% - 100%)    PHYSICAL EXAM:     Constitutional: No Acute Distress   Neurological: AOx0 no verbal output, not FC, EO spont, pupils NR, no nystagmus, Downward Gaze, not tracking, R corneal, L no corneal, +cough and gag, LUE tries to Loc, RUE nothing to nox, BLE TF  Pulmonary: trached to collar. Clear to Auscultation, No rales, No rhonchi, No wheezes   Cardiovascular: S1, S2, Regular rate and rhythm   Gastrointestinal: Soft, Non-tender, Non-distended, +bowel sounds x 4  Extremities: No calf tenderness bilaterally, no cyanosis, clubbing or edema          LABS:                          8.6    10.96 )-----------( 446      ( 07 Dec 2021 06:20 )             27.5    12-07    138  |  101  |  67<H>  ----------------------------<  191<H>  4.3   |  24  |  1.43<H>    Ca    9.2      07 Dec 2021 06:20  Phos  5.3     12-07    TPro  6.6  /  Alb  3.0<L>  /  TBili  0.2  /  DBili  <0.1  /  AST  49<H>  /  ALT  62<H>  /  AlkPhos  122<H>  12-07  PT/INR - ( 07 Dec 2021 15:29 )   PT: 16.2 sec;   INR: 1.37 ratio         PTT - ( 07 Dec 2021 15:29 )  PTT:29.8 sec    12-06 @ 07:01  -  12-07 @ 07:00  --------------------------------------------------------  IN: 1600 mL / OUT: 1100 mL / NET: 500 mL    12-07 @ 07:01  -  12-07 @ 17:11  --------------------------------------------------------  IN: 0 mL / OUT: 250 mL / NET: -250 mL

## 2021-12-07 NOTE — PROGRESS NOTE ADULT - ATTENDING COMMENTS
Events noted  Holding eliquis until Nsx workup completed  Please repeat lower extremity venous duplex in the meantime    Aurea [phone]

## 2021-12-07 NOTE — PROGRESS NOTE ADULT - PROBLEM SELECTOR PLAN 7
s/p stent. was on DAPT per prescription refills   - resume ASA when clear from neurosurgery standpoint  - c/w statin/Labetalol right soleal vein DVT and persistent left posterior tibial, peroneal, gastrocnemius and soleal vein DVT without proximal propagation on last duplex on 11/24  - started on Eliquis 12/2--> monitor for any bleed   - Vasc team is on the case fungal-appearing rash in b/l groin with right side extending to lower abd and lateral hip, improved  - keep area dry  - continue nystatin powder

## 2021-12-07 NOTE — PROCEDURE NOTE - NSINFORMCONSENT_GEN_A_CORE
Benefits, risks, and possible complications of procedure explained to patient/caregiver who verbalized understanding and gave written consent.
Benefits, risks, and possible complications of procedure explained to patient/caregiver who verbalized understanding and gave verbal consent.

## 2021-12-07 NOTE — PROGRESS NOTE ADULT - SUBJECTIVE AND OBJECTIVE BOX
Philippe Ng   Pager [phone]  Office [phone]      CC: Patient is a 61y old  Male who presents with a chief complaint of Cerebellar heme (06 Dec 2021 17:01)      SUBJECTIVE / OVERNIGHT EVENTS:    MEDICATIONS  (STANDING):  apixaban 5 milliGRAM(s) Oral every 12 hours  artificial  tears Solution 1 Drop(s) Both EYES every 6 hours  atorvastatin 40 milliGRAM(s) Oral at bedtime  chlorhexidine 0.12% Liquid 15 milliLiter(s) Oral Mucosa two times a day  chlorhexidine 4% Liquid 1 Application(s) Topical <User Schedule>  dextrose 50% Injectable 25 Gram(s) IV Push once  dextrose 50% Injectable 12.5 Gram(s) IV Push once  insulin lispro (ADMELOG) corrective regimen sliding scale   SubCutaneous every 6 hours  insulin NPH human recombinant 12 Unit(s) SubCutaneous every 6 hours  nystatin Powder 1 Application(s) Topical two times a day  pantoprazole  Injectable 40 milliGRAM(s) IV Push daily  sodium chloride 3%  Inhalation 3 milliLiter(s) Inhalation every 6 hours  vancomycin    Solution 125 milliGRAM(s) Oral every 6 hours    MEDICATIONS  (PRN):  acetaminophen    Suspension .. 650 milliGRAM(s) Oral every 6 hours PRN Temp greater or equal to 38C (100.4F), Mild Pain (1 - 3)      Vital Signs Last 24 Hrs  T(C): 36.9 (07 Dec 2021 08:24), Max: 38.9 (06 Dec 2021 17:54)  T(F): 98.4 (07 Dec 2021 08:24), Max: 102 (06 Dec 2021 17:54)  HR: 88 (07 Dec 2021 08:24) (85 - 101)  BP: 94/60 (07 Dec 2021 08:24) (94/60 - 121/74)  BP(mean): --  RR: 17 (07 Dec 2021 08:24) (17 - 18)  SpO2: 96% (07 Dec 2021 08:24) (96% - 99%)  CAPILLARY BLOOD GLUCOSE      POCT Blood Glucose.: 186 mg/dL (07 Dec 2021 06:05)  POCT Blood Glucose.: 141 mg/dL (07 Dec 2021 00:20)  POCT Blood Glucose.: 163 mg/dL (06 Dec 2021 16:22)  POCT Blood Glucose.: 201 mg/dL (06 Dec 2021 10:48)    I&O's Summary    06 Dec 2021 07:01  -  07 Dec 2021 07:00  --------------------------------------------------------  IN: 1600 mL / OUT: 1100 mL / NET: 500 mL      tele:    PHYSICAL EXAM:    GENERAL: NAD   HEENT: EOMI, PERRL  PULM: Clear to auscultation bilaterally  CV: Regular rate and rhythm; nl S1, S2; No murmurs, rubs, or gallops  ABDOMEN: Soft, Nontender, Nondistended; Bowel sounds present  EXTREMITIES/MSK:  No edema, calf tenderness   PSYCH: AAOx3  NEUROLOGY: non-focal          LABS:                        8.6    10.96 )-----------( 446      ( 07 Dec 2021 06:20 )             27.5     12-    138  |  101  |  67<H>  ----------------------------<  191<H>  4.3   |  24  |  1.43<H>    Ca    9.2      07 Dec 2021 06:20  Phos  5.3     12-    TPro  6.6  /  Alb  3.0<L>  /  TBili  0.2  /  DBili  <0.1  /  AST  49<H>  /  ALT  62<H>  /  AlkPhos  122<H>  12-07          Urinalysis Basic - ( 06 Dec 2021 18:37 )    Color: Yellow / Appearance: Slightly Turbid / S.023 / pH: x  Gluc: x / Ketone: Trace  / Bili: Negative / Urobili: Negative   Blood: x / Protein: 100 mg/dL / Nitrite: Negative   Leuk Esterase: Negative / RBC: 112 /hpf / WBC 3 /HPF   Sq Epi: x / Non Sq Epi: 2 /hpf / Bacteria: Negative          RADIOLOGY & ADDITIONAL TESTS:    Imaging Personally Reviewed:    Consultant(s) Notes Reviewed:      Care Discussed with Consultants/Other Providers:   Philippe Ng   Pager [phone]  Office [phone]      CC: Patient is a 61y old  Male who presents with a chief complaint of Cerebellar heme (06 Dec 2021 17:01)      SUBJECTIVE / OVERNIGHT EVENTS: less responsive today and not withdrawing UE per staff    MEDICATIONS  (STANDING):  apixaban 5 milliGRAM(s) Oral every 12 hours  artificial  tears Solution 1 Drop(s) Both EYES every 6 hours  atorvastatin 40 milliGRAM(s) Oral at bedtime  chlorhexidine 0.12% Liquid 15 milliLiter(s) Oral Mucosa two times a day  chlorhexidine 4% Liquid 1 Application(s) Topical <User Schedule>  dextrose 50% Injectable 25 Gram(s) IV Push once  dextrose 50% Injectable 12.5 Gram(s) IV Push once  insulin lispro (ADMELOG) corrective regimen sliding scale   SubCutaneous every 6 hours  insulin NPH human recombinant 12 Unit(s) SubCutaneous every 6 hours  nystatin Powder 1 Application(s) Topical two times a day  pantoprazole  Injectable 40 milliGRAM(s) IV Push daily  sodium chloride 3%  Inhalation 3 milliLiter(s) Inhalation every 6 hours  vancomycin    Solution 125 milliGRAM(s) Oral every 6 hours    MEDICATIONS  (PRN):  acetaminophen    Suspension .. 650 milliGRAM(s) Oral every 6 hours PRN Temp greater or equal to 38C (100.4F), Mild Pain (1 - 3)      Vital Signs Last 24 Hrs  T(C): 36.9 (07 Dec 2021 08:24), Max: 38.9 (06 Dec 2021 17:54)  T(F): 98.4 (07 Dec 2021 08:24), Max: 102 (06 Dec 2021 17:54)  HR: 88 (07 Dec 2021 08:24) (85 - 101)  BP: 94/60 (07 Dec 2021 08:24) (94/60 - 121/74)  BP(mean): --  RR: 17 (07 Dec 2021 08:24) (17 - 18)  SpO2: 96% (07 Dec 2021 08:24) (96% - 99%)  CAPILLARY BLOOD GLUCOSE      POCT Blood Glucose.: 186 mg/dL (07 Dec 2021 06:05)  POCT Blood Glucose.: 141 mg/dL (07 Dec 2021 00:20)  POCT Blood Glucose.: 163 mg/dL (06 Dec 2021 16:22)  POCT Blood Glucose.: 201 mg/dL (06 Dec 2021 10:48)    I&O's Summary    06 Dec 2021 07:01  -  07 Dec 2021 07:00  --------------------------------------------------------  IN: 1600 mL / OUT: 1100 mL / NET: 500 mL          PHYSICAL EXAM:    GENERAL: NAD   HEENT: EOMI, PERRL  PULM: Clear to auscultation bilaterally  CV: Regular rate and rhythm; nl S1, S2; No murmurs, rubs, or gallops  ABDOMEN: Soft, Nontender, Nondistended; Bowel sounds present  EXTREMITIES/MSK:  No edema, calf tenderness   PSYCH: somnolent  NEUROLOGY: doesnt withdraw extremities to noxious stimuli          LABS:                        8.6    10.96 )-----------( 446      ( 07 Dec 2021 06:20 )             27.5     12-    138  |  101  |  67<H>  ----------------------------<  191<H>  4.3   |  24  |  1.43<H>    Ca    9.2      07 Dec 2021 06:20  Phos  5.3     12    TPro  6.6  /  Alb  3.0<L>  /  TBili  0.2  /  DBili  <0.1  /  AST  49<H>  /  ALT  62<H>  /  AlkPhos  122<H>  12-          Urinalysis Basic - ( 06 Dec 2021 18:37 )    Color: Yellow / Appearance: Slightly Turbid / S.023 / pH: x  Gluc: x / Ketone: Trace  / Bili: Negative / Urobili: Negative   Blood: x / Protein: 100 mg/dL / Nitrite: Negative   Leuk Esterase: Negative / RBC: 112 /hpf / WBC 3 /HPF   Sq Epi: x / Non Sq Epi: 2 /hpf / Bacteria: Negative          RADIOLOGY & ADDITIONAL TESTS:    Imaging Personally Reviewed:    Consultant(s) Notes Reviewed:      Care Discussed with Consultants/Other Providers: neurosurg   Philippe Ng   Pager [phone]  Office [phone]      CC: Patient is a 61y old  Male who presents with a chief complaint of Cerebellar heme (06 Dec 2021 17:01)      SUBJECTIVE / OVERNIGHT EVENTS: less responsive today and not withdrawing UE per staff.    MEDICATIONS  (STANDING):  apixaban 5 milliGRAM(s) Oral every 12 hours  artificial  tears Solution 1 Drop(s) Both EYES every 6 hours  atorvastatin 40 milliGRAM(s) Oral at bedtime  chlorhexidine 0.12% Liquid 15 milliLiter(s) Oral Mucosa two times a day  chlorhexidine 4% Liquid 1 Application(s) Topical <User Schedule>  dextrose 50% Injectable 25 Gram(s) IV Push once  dextrose 50% Injectable 12.5 Gram(s) IV Push once  insulin lispro (ADMELOG) corrective regimen sliding scale   SubCutaneous every 6 hours  insulin NPH human recombinant 12 Unit(s) SubCutaneous every 6 hours  nystatin Powder 1 Application(s) Topical two times a day  pantoprazole  Injectable 40 milliGRAM(s) IV Push daily  sodium chloride 3%  Inhalation 3 milliLiter(s) Inhalation every 6 hours  vancomycin    Solution 125 milliGRAM(s) Oral every 6 hours    MEDICATIONS  (PRN):  acetaminophen    Suspension .. 650 milliGRAM(s) Oral every 6 hours PRN Temp greater or equal to 38C (100.4F), Mild Pain (1 - 3)      Vital Signs Last 24 Hrs  T(C): 36.9 (07 Dec 2021 08:24), Max: 38.9 (06 Dec 2021 17:54)  T(F): 98.4 (07 Dec 2021 08:24), Max: 102 (06 Dec 2021 17:54)  HR: 88 (07 Dec 2021 08:24) (85 - 101)  BP: 94/60 (07 Dec 2021 08:24) (94/60 - 121/74)  BP(mean): --  RR: 17 (07 Dec 2021 08:24) (17 - 18)  SpO2: 96% (07 Dec 2021 08:24) (96% - 99%)  CAPILLARY BLOOD GLUCOSE      POCT Blood Glucose.: 186 mg/dL (07 Dec 2021 06:05)  POCT Blood Glucose.: 141 mg/dL (07 Dec 2021 00:20)  POCT Blood Glucose.: 163 mg/dL (06 Dec 2021 16:22)  POCT Blood Glucose.: 201 mg/dL (06 Dec 2021 10:48)    I&O's Summary    06 Dec 2021 07:01  -  07 Dec 2021 07:00  --------------------------------------------------------  IN: 1600 mL / OUT: 1100 mL / NET: 500 mL          PHYSICAL EXAM:    GENERAL: NAD   HEENT: EOMI, PERRL  PULM: Clear to auscultation bilaterally  CV: Regular rate and rhythm; nl S1, S2; No murmurs, rubs, or gallops  ABDOMEN: Soft, Nontender, Nondistended; Bowel sounds present  EXTREMITIES/MSK:  No edema, calf tenderness   PSYCH: somnolent  NEUROLOGY: doesnt withdraw extremities to noxious stimuli          LABS:                        8.6    10.96 )-----------( 446      ( 07 Dec 2021 06:20 )             27.5     12-    138  |  101  |  67<H>  ----------------------------<  191<H>  4.3   |  24  |  1.43<H>    Ca    9.2      07 Dec 2021 06:20  Phos  5.3     12    TPro  6.6  /  Alb  3.0<L>  /  TBili  0.2  /  DBili  <0.1  /  AST  49<H>  /  ALT  62<H>  /  AlkPhos  122<H>  12-          Urinalysis Basic - ( 06 Dec 2021 18:37 )    Color: Yellow / Appearance: Slightly Turbid / S.023 / pH: x  Gluc: x / Ketone: Trace  / Bili: Negative / Urobili: Negative   Blood: x / Protein: 100 mg/dL / Nitrite: Negative   Leuk Esterase: Negative / RBC: 112 /hpf / WBC 3 /HPF   Sq Epi: x / Non Sq Epi: 2 /hpf / Bacteria: Negative          RADIOLOGY & ADDITIONAL TESTS:    Imaging Personally Reviewed:    Consultant(s) Notes Reviewed:      Care Discussed with Consultants/Other Providers: neurosurg

## 2021-12-07 NOTE — PROGRESS NOTE ADULT - PROBLEM SELECTOR PLAN 5
s/p trach. on trach collar  - continue trach care and suctioning  - monitor O2 sats fungal-appearing rash in b/l groin with right side extending to lower abd and lateral hip, improved  - keep area dry  - continue nystatin powder S/p VPS placement on 11/23.  s/p PICA aneurysm resection. s/p EVD clamped & removed.  cont mgt as per neurosurgery

## 2021-12-07 NOTE — PROGRESS NOTE ADULT - ASSESSMENT
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 s/p EVD clamp & removal. NSCU course also c/b respiratory failure and dysphagia now s/p trach and PEG on 11/19. S/p Certas@4 VPS placement on 11/23. Completed course of cefepime for enterobacter and pseudomonas pneumonia on 11/21. Transferred out of NSCU on 11/25. Began to spike fevers with watery output from rectal tube found to be positive for c. diff, PO vanco started on 11/26.  to finish in 2 days.   Persistent fever, increased secretions, sputum colonized by Pseudomonas, Cefepime restarted on 11/28 and stopped 12/6.   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 Moved to ICU w plan to do  shunt revision tomorrow      NEURO:  OR tomorrow  [id number]  VPS certas@4 shunt management per NSGY      PULM: trach 6 josi uncuffed to collar  cont suctions    CV: lipitor  Keep -160mmHg  labetalol     RENAL: MICHELLE likely prerenal from retaining, monitor IOs teixeira  Net negative would give bolus, NS @ 75  cont free water bolus 250 q6    GI: on PPI per GI  PEG feeds on hold  Bowel regimen standing    ENDO: half insulin dose while NPO for OR  cont glucose checks  Goal euglycemia (-180)    HEME/ONC:  VTE prophylaxis: [x] SCDs hold eliquis for OR    ID:  C diff colitis, on oral vanco and having fevers continue treatment  ID following  Monitor off abx, FU CSF cultures if positive start empiric abx      MISC:    SOCIAL/FAMILY:  [x] awaiting [] updated at bedside [] family meeting    CODE STATUS:  [] Full Code [x] DNR [] DNI [] Palliative/Comfort Care    DISPOSITION:  [x] ICU [] Stroke Unit [] Floor [] EMU [] RCU [] PCU    [] Patient is at high risk of neurologic deterioration/death due to:     Time seen: 25 min  Time spent: 45 critical care minutes    Contact: [phone]   ASSESSMENT: 60 yo male with PMH of HTN, CAD s/p stent on DAPT, T2DM who had CT head with posterior fossa hemorrhage c/b severe IVH with casting of the 4th and 3rd ventricles with hydrocephalus, s/p EVD and SOC, 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 s/p EVD clamp & removal. NSCU course also c/b respiratory failure and dysphagia now s/p trach and PEG on 11/19. S/p Certas@4 VPS placement on 11/23. Completed course of cefepime for enterobacter and pseudomonas pneumonia on 11/21. Transferred out of NSCU on 11/25. Began to spike fevers with watery output from rectal tube found to be positive for c. diff, PO vanco started on 11/26.  to finish in 2 days.   Persistent fever, increased secretions, sputum colonized by Pseudomonas, Cefepime restarted on 11/28 and stopped 12/6.   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 Moved to ICU w plan to do  shunt revision tomorrow      NEURO:  OR tomorrow  [id number]  VPS certas@4 shunt management per NSGY      PULM: trach 6 josi uncuffed to collar  cont suctions    CV: lipitor  Keep -160mmHg  labetalol     RENAL: MICHELLE likely prerenal from retaining, monitor IOs teixeira  Net negative would give bolus, NS @ 75  cont free water bolus 250 q6    GI: on PPI per GI  PEG feeds on hold  Bowel regimen standing    ENDO: half insulin dose while NPO for OR  cont glucose checks  Goal euglycemia (-180)    HEME/ONC:  VTE prophylaxis: [x] SCDs hold eliquis for OR    ID:  C diff colitis, on oral vanco and having fevers continue treatment  ID following  Monitor off abx, FU CSF cultures if positive start empiric abx      MISC:    SOCIAL/FAMILY:  [x] awaiting [] updated at bedside [] family meeting    CODE STATUS:  [] Full Code [x] DNR [] DNI [] Palliative/Comfort Care    DISPOSITION:  [x] ICU [] Stroke Unit [] Floor [] EMU [] RCU [] PCU    [x] Patient is at high risk of neurologic deterioration/death due to: hydrocephalus, brain compression      Contact: [phone]

## 2021-12-07 NOTE — PROGRESS NOTE ADULT - SUBJECTIVE AND OBJECTIVE BOX
O: admitted to the NSCU for concern for VPS malfunction and pseudomeningocele     T(C): 36.2 (12-07-21 @ 16:15), Max: 36.9 (12-07-21 @ 08:24)  HR: 89 (12-07-21 @ 18:40) (88 - 95)  BP: 103/60 (12-07-21 @ 18:40) (92/58 - 145/87)  RR: 13 (12-07-21 @ 18:40) (12 - 20)  SpO2: 100% (12-07-21 @ 18:40) (95% - 100%)  12-06-21 @ 07:01  -  12-07-21 @ 07:00  --------------------------------------------------------  IN: 1600 mL / OUT: 1100 mL / NET: 500 mL    12-07-21 @ 07:01  -  12-07-21 @ 19:15  --------------------------------------------------------  IN: 150 mL / OUT: 345 mL / NET: -195 mL    acetaminophen    Suspension .. 650 milliGRAM(s) Oral every 6 hours PRN  artificial  tears Solution 1 Drop(s) Both EYES every 6 hours  atorvastatin 40 milliGRAM(s) Oral at bedtime  chlorhexidine 0.12% Liquid 15 milliLiter(s) Oral Mucosa two times a day  chlorhexidine 4% Liquid 1 Application(s) Topical <User Schedule>  dextrose 50% Injectable 25 Gram(s) IV Push once  dextrose 50% Injectable 12.5 Gram(s) IV Push once  insulin lispro (ADMELOG) corrective regimen sliding scale   SubCutaneous every 6 hours  insulin NPH human recombinant 12 Unit(s) SubCutaneous every 6 hours  nystatin Powder 1 Application(s) Topical two times a day  pantoprazole  Injectable 40 milliGRAM(s) IV Push daily  sodium chloride 0.9%. 1000 milliLiter(s) IV Continuous <Continuous>  sodium chloride 3%  Inhalation 3 milliLiter(s) Inhalation every 6 hours  vancomycin    Solution 125 milliGRAM(s) Oral every 6 hours    PHYSICAL EXAM:     Constitutional: No Acute Distress   Neurological: AOx0 no verbal output, not FC, EO spont, pupils NR, no nystagmus, Downward Gaze, not tracking, R corneal, L no corneal, +cough and gag, LUE tries to Loc, RUE nothing to nox, BLE TF  Pulmonary: trached to collar. Clear to Auscultation, No rales, No rhonchi, No wheezes   Cardiovascular: S1, S2, Regular rate and rhythm   Gastrointestinal: Soft, Non-tender, Non-distended, +bowel sounds x 4  Extremities: No calf tenderness bilaterally, no cyanosis, clubbing or edema      LABS:  Na: 138 (12-07 @ 06:20), 139 (12-05 @ 17:42)  K: 4.3 (12-07 @ 06:20), 4.8 (12-05 @ 17:42)  Cl: 101 (12-07 @ 06:20), 101 (12-05 @ 17:42)  CO2: 24 (12-07 @ 06:20), 27 (12-05 @ 17:42)  BUN: 67 (12-07 @ 06:20), 39 (12-05 @ 17:42)  Cr: 1.43 (12-07 @ 06:20), 1.06 (12-05 @ 17:42)  Glu: 191(12-07 @ 06:20), 154(12-05 @ 17:42)    Hgb: 8.6 (12-07 @ 06:20), 9.3 (12-05 @ 17:42)  Hct: 27.5 (12-07 @ 06:20), 29.6 (12-05 @ 17:42)  WBC: 10.96 (12-07 @ 06:20), 10.73 (12-05 @ 17:42)  Plt: 446 (12-07 @ 06:20), 450 (12-05 @ 17:42)    INR: 1.37 12-07-21 @ 15:29  PTT: 29.8 12-07-21 @ 15:29        EXAM:  CT BRAIN                            PROCEDURE DATE:  12/07/2021            INTERPRETATION:  CLINICAL INDICATION: Follow-up after ICH evacuation    5mm axial sections of the brain were obtained from base to vertex, without the intravenous administration of contrast material. Coronal and sagittal computer generated reconstructed views are available.      Comparison is made with the prior CT of 11/26/2021.    In the interval since the previous exam the ventricles have moderately enlarged and there is prominence of temporal horns. A right parietal  shunt catheter has its tip in the midline. There is been a suboccipital craniectomy and there are postoperative changes involving the left cerebellum with a small amount of high density hemorrhage is less prominent compared to the prior exam.    IMPRESSION: Left suboccipital craniectomy and cerebellar postoperative changes. Right parietal  shunt catheter. Moderate enlargement of the ventricles compared with 11/26/2021..        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, s/p crani for PICA aneurysm resection on 11/10/2021, s/p trach/PEG on 11/19/21, s/p insertion of right parietal ventriculoperitoneal shunt (Certas @ 4) on 11/23/2021 now presenting for possible VPS malfunction with CT head today showing worsening hydrocephalus, and pseudomeningocele     neuro : pseudomeningocele got aspirated today and VPS tapped   neuro checks q 1 hr    possible VPS tomorrow   CSF studies pending   was on Eliquis for DVT, will have to wait before revising the shunt   CV : SBP goal   100-160 mmhg   CAD   --s/p stent and on DAPT  --ASA to be resumed when cleared by NSG; on statin  Pulm: Respiratory failure.   --s/p trach. on trach collar  GI: C.diff + on vancomycin   Renal: see neuro   ID afebrile  Endo: DM, target sugar 120-180   insulin NPH human recombinant 12 Unit(s) SubCutaneous every 6 hours  Hem: SCD,   DVT on eliquis on hold for VPS revision     35 critical care time as patient is at risk for brain henrniation, ICP crisis, seizures, ICH  O: admitted to the NSCU for concern for VPS malfunction and pseudomeningocele     T(C): 36.2 (12-07-21 @ 16:15), Max: 36.9 (12-07-21 @ 08:24)  HR: 89 (12-07-21 @ 18:40) (88 - 95)  BP: 103/60 (12-07-21 @ 18:40) (92/58 - 145/87)  RR: 13 (12-07-21 @ 18:40) (12 - 20)  SpO2: 100% (12-07-21 @ 18:40) (95% - 100%)  12-06-21 @ 07:01  -  12-07-21 @ 07:00  --------------------------------------------------------  IN: 1600 mL / OUT: 1100 mL / NET: 500 mL    12-07-21 @ 07:01  -  12-07-21 @ 19:15  --------------------------------------------------------  IN: 150 mL / OUT: 345 mL / NET: -195 mL    acetaminophen    Suspension .. 650 milliGRAM(s) Oral every 6 hours PRN  artificial  tears Solution 1 Drop(s) Both EYES every 6 hours  atorvastatin 40 milliGRAM(s) Oral at bedtime  chlorhexidine 0.12% Liquid 15 milliLiter(s) Oral Mucosa two times a day  chlorhexidine 4% Liquid 1 Application(s) Topical <User Schedule>  dextrose 50% Injectable 25 Gram(s) IV Push once  dextrose 50% Injectable 12.5 Gram(s) IV Push once  insulin lispro (ADMELOG) corrective regimen sliding scale   SubCutaneous every 6 hours  insulin NPH human recombinant 12 Unit(s) SubCutaneous every 6 hours  nystatin Powder 1 Application(s) Topical two times a day  pantoprazole  Injectable 40 milliGRAM(s) IV Push daily  sodium chloride 0.9%. 1000 milliLiter(s) IV Continuous <Continuous>  sodium chloride 3%  Inhalation 3 milliLiter(s) Inhalation every 6 hours  vancomycin    Solution 125 milliGRAM(s) Oral every 6 hours    PHYSICAL EXAM:     Constitutional: No Acute Distress   Neurological: AOx0 no verbal output, not FC, EO spont, pupils NR, no nystagmus, Downward Gaze, not tracking, R corneal, L no corneal, +cough and gag, LUE tries to Loc,0/5 in UE,  BLE TF  Pulmonary: trached to collar. Clear to Auscultation, No rales, No rhonchi, No wheezes   Cardiovascular: S1, S2, Regular rate and rhythm   Gastrointestinal: Soft, Non-tender, Non-distended, +bowel sounds x 4  Extremities: No edema      LABS:  Na: 138 (12-07 @ 06:20), 139 (12-05 @ 17:42)  K: 4.3 (12-07 @ 06:20), 4.8 (12-05 @ 17:42)  Cl: 101 (12-07 @ 06:20), 101 (12-05 @ 17:42)  CO2: 24 (12-07 @ 06:20), 27 (12-05 @ 17:42)  BUN: 67 (12-07 @ 06:20), 39 (12-05 @ 17:42)  Cr: 1.43 (12-07 @ 06:20), 1.06 (12-05 @ 17:42)  Glu: 191(12-07 @ 06:20), 154(12-05 @ 17:42)    Hgb: 8.6 (12-07 @ 06:20), 9.3 (12-05 @ 17:42)  Hct: 27.5 (12-07 @ 06:20), 29.6 (12-05 @ 17:42)  WBC: 10.96 (12-07 @ 06:20), 10.73 (12-05 @ 17:42)  Plt: 446 (12-07 @ 06:20), 450 (12-05 @ 17:42)    INR: 1.37 12-07-21 @ 15:29  PTT: 29.8 12-07-21 @ 15:29        EXAM:  CT BRAIN                            PROCEDURE DATE:  12/07/2021            INTERPRETATION:  CLINICAL INDICATION: Follow-up after ICH evacuation    5mm axial sections of the brain were obtained from base to vertex, without the intravenous administration of contrast material. Coronal and sagittal computer generated reconstructed views are available.      Comparison is made with the prior CT of 11/26/2021.    In the interval since the previous exam the ventricles have moderately enlarged and there is prominence of temporal horns. A right parietal  shunt catheter has its tip in the midline. There is been a suboccipital craniectomy and there are postoperative changes involving the left cerebellum with a small amount of high density hemorrhage is less prominent compared to the prior exam.    IMPRESSION: Left suboccipital craniectomy and cerebellar postoperative changes. Right parietal  shunt catheter. Moderate enlargement of the ventricles compared with 11/26/2021..        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, s/p crani for PICA aneurysm resection on 11/10/2021, s/p trach/PEG on 11/19/21, s/p insertion of right parietal ventriculoperitoneal shunt (Certas @ 4) on 11/23/2021 now presenting for possible VPS malfunction with CT head today showing worsening hydrocephalus, and pseudomeningocele     neuro : pseudomeningocele got aspirated today and VPS tapped   neuro checks q 1 hr    possible VPS tomorrow   CSF studies pending   was on Eliquis for DVT, will have to wait before revising the shunt   CV : SBP goal   100-160 mmhg   CAD   --s/p stent and on DAPT  --ASA to be resumed when cleared by NSG; on statin  Pulm: Respiratory failure.   --s/p trach. on trach collar  GI: C.diff + on vancomycin   Renal: see neuro   ID afebrile  Endo: DM, target sugar 120-180   insulin NPH human recombinant 12 Unit(s) SubCutaneous every 6 hours  Hem: SCD,   DVT on eliquis on hold for VPS revision     35 critical care time as patient is at risk for brain henrniation, ICP crisis, seizures, ICH  O: admitted to the NSCU for concern for VPS malfunction and pseudomeningocele     T(C): 36.2 (12-07-21 @ 16:15), Max: 36.9 (12-07-21 @ 08:24)  HR: 89 (12-07-21 @ 18:40) (88 - 95)  BP: 103/60 (12-07-21 @ 18:40) (92/58 - 145/87)  RR: 13 (12-07-21 @ 18:40) (12 - 20)  SpO2: 100% (12-07-21 @ 18:40) (95% - 100%)  12-06-21 @ 07:01  -  12-07-21 @ 07:00  --------------------------------------------------------  IN: 1600 mL / OUT: 1100 mL / NET: 500 mL    12-07-21 @ 07:01  -  12-07-21 @ 19:15  --------------------------------------------------------  IN: 150 mL / OUT: 345 mL / NET: -195 mL    acetaminophen    Suspension .. 650 milliGRAM(s) Oral every 6 hours PRN  artificial  tears Solution 1 Drop(s) Both EYES every 6 hours  atorvastatin 40 milliGRAM(s) Oral at bedtime  chlorhexidine 0.12% Liquid 15 milliLiter(s) Oral Mucosa two times a day  chlorhexidine 4% Liquid 1 Application(s) Topical <User Schedule>  dextrose 50% Injectable 25 Gram(s) IV Push once  dextrose 50% Injectable 12.5 Gram(s) IV Push once  insulin lispro (ADMELOG) corrective regimen sliding scale   SubCutaneous every 6 hours  insulin NPH human recombinant 12 Unit(s) SubCutaneous every 6 hours  nystatin Powder 1 Application(s) Topical two times a day  pantoprazole  Injectable 40 milliGRAM(s) IV Push daily  sodium chloride 0.9%. 1000 milliLiter(s) IV Continuous <Continuous>  sodium chloride 3%  Inhalation 3 milliLiter(s) Inhalation every 6 hours  vancomycin    Solution 125 milliGRAM(s) Oral every 6 hours    PHYSICAL EXAM:     Constitutional: No Acute Distress   Neurological: AOx0 no verbal output, not FC, EO spont, pupils NR, no nystagmus, Downward Gaze, not tracking, R corneal, L no corneal, +cough and gag, LUE tries to Loc, trace withdrawal in the RUE, left UE 0/5  ,  BLE TF  Pulmonary: trached to collar. Clear to Auscultation, No rales, No rhonchi, No wheezes   Cardiovascular: S1, S2, Regular rate and rhythm   Gastrointestinal: Soft, Non-tender, Non-distended, +bowel sounds x 4  Extremities: No edema      LABS:  Na: 138 (12-07 @ 06:20), 139 (12-05 @ 17:42)  K: 4.3 (12-07 @ 06:20), 4.8 (12-05 @ 17:42)  Cl: 101 (12-07 @ 06:20), 101 (12-05 @ 17:42)  CO2: 24 (12-07 @ 06:20), 27 (12-05 @ 17:42)  BUN: 67 (12-07 @ 06:20), 39 (12-05 @ 17:42)  Cr: 1.43 (12-07 @ 06:20), 1.06 (12-05 @ 17:42)  Glu: 191(12-07 @ 06:20), 154(12-05 @ 17:42)    Hgb: 8.6 (12-07 @ 06:20), 9.3 (12-05 @ 17:42)  Hct: 27.5 (12-07 @ 06:20), 29.6 (12-05 @ 17:42)  WBC: 10.96 (12-07 @ 06:20), 10.73 (12-05 @ 17:42)  Plt: 446 (12-07 @ 06:20), 450 (12-05 @ 17:42)    INR: 1.37 12-07-21 @ 15:29  PTT: 29.8 12-07-21 @ 15:29        EXAM:  CT BRAIN                            PROCEDURE DATE:  12/07/2021            INTERPRETATION:  CLINICAL INDICATION: Follow-up after ICH evacuation    5mm axial sections of the brain were obtained from base to vertex, without the intravenous administration of contrast material. Coronal and sagittal computer generated reconstructed views are available.      Comparison is made with the prior CT of 11/26/2021.    In the interval since the previous exam the ventricles have moderately enlarged and there is prominence of temporal horns. A right parietal  shunt catheter has its tip in the midline. There is been a suboccipital craniectomy and there are postoperative changes involving the left cerebellum with a small amount of high density hemorrhage is less prominent compared to the prior exam.    IMPRESSION: Left suboccipital craniectomy and cerebellar postoperative changes. Right parietal  shunt catheter. Moderate enlargement of the ventricles compared with 11/26/2021..        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, s/p crani for PICA aneurysm resection on 11/10/2021, s/p trach/PEG on 11/19/21, s/p insertion of right parietal ventriculoperitoneal shunt (Certas @ 4) on 11/23/2021 now presenting for possible VPS malfunction with CT head today showing worsening hydrocephalus, and pseudomeningocele     neuro : pseudomeningocele got aspirated today and VPS tapped   neuro checks q 1 hr    possible VPS tomorrow   CSF studies pending   was on Eliquis for DVT, will have to wait before revising the shunt   CV : SBP goal   100-160 mmhg   CAD   --s/p stent and on DAPT  --ASA to be resumed when cleared by NSG; on statin  Pulm: Respiratory failure.   --s/p trach. on trach collar  GI: C.diff + on vancomycin   Renal: MICHELLE   ID afebrile  Endo: DM, target sugar 120-180   insulin NPH human recombinant 12 Unit(s) SubCutaneous every 6 hours  Hem: SCD,   DVT on eliquis on hold for VPS revision     35 critical care time as patient is at risk for brain henrniation, ICP crisis, seizures, ICH  O: admitted to the NSCU for concern for VPS malfunction and pseudomeningocele     T(C): 36.2 (12-07-21 @ 16:15), Max: 36.9 (12-07-21 @ 08:24)  HR: 89 (12-07-21 @ 18:40) (88 - 95)  BP: 103/60 (12-07-21 @ 18:40) (92/58 - 145/87)  RR: 13 (12-07-21 @ 18:40) (12 - 20)  SpO2: 100% (12-07-21 @ 18:40) (95% - 100%)  12-06-21 @ 07:01  -  12-07-21 @ 07:00  --------------------------------------------------------  IN: 1600 mL / OUT: 1100 mL / NET: 500 mL    12-07-21 @ 07:01  -  12-07-21 @ 19:15  --------------------------------------------------------  IN: 150 mL / OUT: 345 mL / NET: -195 mL    acetaminophen    Suspension .. 650 milliGRAM(s) Oral every 6 hours PRN  artificial  tears Solution 1 Drop(s) Both EYES every 6 hours  atorvastatin 40 milliGRAM(s) Oral at bedtime  chlorhexidine 0.12% Liquid 15 milliLiter(s) Oral Mucosa two times a day  chlorhexidine 4% Liquid 1 Application(s) Topical <User Schedule>  dextrose 50% Injectable 25 Gram(s) IV Push once  dextrose 50% Injectable 12.5 Gram(s) IV Push once  insulin lispro (ADMELOG) corrective regimen sliding scale   SubCutaneous every 6 hours  insulin NPH human recombinant 12 Unit(s) SubCutaneous every 6 hours  nystatin Powder 1 Application(s) Topical two times a day  pantoprazole  Injectable 40 milliGRAM(s) IV Push daily  sodium chloride 0.9%. 1000 milliLiter(s) IV Continuous <Continuous>  sodium chloride 3%  Inhalation 3 milliLiter(s) Inhalation every 6 hours  vancomycin    Solution 125 milliGRAM(s) Oral every 6 hours    PHYSICAL EXAM:     Constitutional: No Acute Distress   Neurological: AOx0 no verbal output, not FC, EO spont, pupils NR, no nystagmus, Downward Gaze, not tracking, R corneal, L no corneal, +cough and gag, LUE tries to Loc, trace withdrawal in the RUE, left UE 0/5  ,  BLE TF  Pulmonary: trached to collar. Clear to Auscultation, No rales, No rhonchi, No wheezes   Cardiovascular: S1, S2, Regular rate and rhythm   Gastrointestinal: Soft, Non-tender, Non-distended, +bowel sounds x 4  Extremities: No edema      LABS:  Na: 138 (12-07 @ 06:20), 139 (12-05 @ 17:42)  K: 4.3 (12-07 @ 06:20), 4.8 (12-05 @ 17:42)  Cl: 101 (12-07 @ 06:20), 101 (12-05 @ 17:42)  CO2: 24 (12-07 @ 06:20), 27 (12-05 @ 17:42)  BUN: 67 (12-07 @ 06:20), 39 (12-05 @ 17:42)  Cr: 1.43 (12-07 @ 06:20), 1.06 (12-05 @ 17:42)  Glu: 191(12-07 @ 06:20), 154(12-05 @ 17:42)    Hgb: 8.6 (12-07 @ 06:20), 9.3 (12-05 @ 17:42)  Hct: 27.5 (12-07 @ 06:20), 29.6 (12-05 @ 17:42)  WBC: 10.96 (12-07 @ 06:20), 10.73 (12-05 @ 17:42)  Plt: 446 (12-07 @ 06:20), 450 (12-05 @ 17:42)    INR: 1.37 12-07-21 @ 15:29  PTT: 29.8 12-07-21 @ 15:29        EXAM:  CT BRAIN                            PROCEDURE DATE:  12/07/2021            INTERPRETATION:  CLINICAL INDICATION: Follow-up after ICH evacuation    5mm axial sections of the brain were obtained from base to vertex, without the intravenous administration of contrast material. Coronal and sagittal computer generated reconstructed views are available.      Comparison is made with the prior CT of 11/26/2021.    In the interval since the previous exam the ventricles have moderately enlarged and there is prominence of temporal horns. A right parietal  shunt catheter has its tip in the midline. There is been a suboccipital craniectomy and there are postoperative changes involving the left cerebellum with a small amount of high density hemorrhage is less prominent compared to the prior exam.    IMPRESSION: Left suboccipital craniectomy and cerebellar postoperative changes. Right parietal  shunt catheter. Moderate enlargement of the ventricles compared with 11/26/2021..        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, s/p crani for PICA aneurysm resection on 11/10/2021, s/p trach/PEG on 11/19/21, s/p insertion of right parietal ventriculoperitoneal shunt (Certas @ 4) on 11/23/2021 now presenting for possible VPS malfunction with CT head today showing worsening hydrocephalus, and pseudomeningocele     neuro : pseudomeningocele got aspirated today and VPS tapped   neuro checks q 1 hr    possible VPS tomorrow   CSF studies pending   was on Eliquis for DVT, will have to wait before revising the shunt   CV : SBP goal   100-160 mmhg   CAD   --s/p stent and on DAPT  --ASA to be resumed when cleared by NSG; on statin  Pulm: Respiratory failure.   --s/p trach. on trach collar  consult ENT as unable to suction him   GI: C.diff + on vancomycin   Renal: MICHELLE   NS 75 ml/hr   s/p 500 ml bolus   ID afebrile  Endo: DM, target sugar 120-180   insulin NPH human recombinant 12 Unit(s) SubCutaneous every 6 hours, hold while NPO   Hem: SCD,   DVT on eliquis on hold for VPS revision     35 critical care time as patient is at risk for brain henrniation, ICP crisis, seizures, ICH

## 2021-12-07 NOTE — PROGRESS NOTE ADULT - SUBJECTIVE AND OBJECTIVE BOX
SUBJECTIVE:   Patient was seen and evaluated at bedside. Patient is resting in bed and was unable to obtain a full exam.   OVERNIGHT EVENTS:   none   Vital Signs Last 24 Hrs  T(C): 36.6 (07 Dec 2021 11:52), Max: 38.9 (06 Dec 2021 17:54)  T(F): 97.9 (07 Dec 2021 11:52), Max: 102 (06 Dec 2021 17:54)  HR: 88 (07 Dec 2021 11:52) (88 - 101)  BP: 122/69 (07 Dec 2021 11:52) (94/60 - 122/69)  BP(mean): --  RR: 18 (07 Dec 2021 11:52) (17 - 18)  SpO2: 95% (07 Dec 2021 11:52) (95% - 99%)    PHYSICAL EXAM:    General: No Acute Distress     Neurological: open eyes , pupils are 3 and NR , NO FC , UPPERS NOTHING , TF on lowers     Pulmonary: Clear to Auscultation, No Rales, No Rhonchi, No Wheezes     Cardiovascular: S1, S2, Regular Rate and Rhythm     Gastrointestinal: Soft, Nontender, Nondistended     Incision:   clean and dry   LABS:                        8.6    10.96 )-----------( 446      ( 07 Dec 2021 06:20 )             27.5        138  |  101  |  67<H>  ----------------------------<  191<H>  4.3   |  24  |  1.43<H>    Ca    9.2      07 Dec 2021 06:20  Phos  5.3         TPro  6.6  /  Alb  3.0<L>  /  TBili  0.2  /  DBili  <0.1  /  AST  49<H>  /  ALT  62<H>  /  AlkPhos  122<H>   @ 07:01  -   @ 07:00  --------------------------------------------------------  IN: 1600 mL / OUT: 1100 mL / NET: 500 mL      DRAINS:     MEDICATIONS:  Antibiotics:  vancomycin    Solution 125 milliGRAM(s) Oral every 6 hours    Neuro:  acetaminophen    Suspension .. 650 milliGRAM(s) Oral every 6 hours PRN Temp greater or equal to 38C (100.4F), Mild Pain (1 - 3)    Cardiac:    Pulm:  sodium chloride 3%  Inhalation 3 milliLiter(s) Inhalation every 6 hours    GI/:  pantoprazole  Injectable 40 milliGRAM(s) IV Push daily    Other:   apixaban 5 milliGRAM(s) Oral every 12 hours  artificial  tears Solution 1 Drop(s) Both EYES every 6 hours  atorvastatin 40 milliGRAM(s) Oral at bedtime  chlorhexidine 0.12% Liquid 15 milliLiter(s) Oral Mucosa two times a day  chlorhexidine 4% Liquid 1 Application(s) Topical <User Schedule>  dextrose 50% Injectable 25 Gram(s) IV Push once  dextrose 50% Injectable 12.5 Gram(s) IV Push once  insulin lispro (ADMELOG) corrective regimen sliding scale   SubCutaneous every 6 hours  insulin NPH human recombinant 12 Unit(s) SubCutaneous every 6 hours  nystatin Powder 1 Application(s) Topical two times a day    DIET: [] Regular [] CCD [] Renal [] Puree [] Dysphagia [] Tube Feeds:   glucerna @80   IMAGIN/7 ct head   PROCEDURE DATE:  2021            INTERPRETATION:  CLINICAL INDICATION: Follow-up after ICH evacuation    5mm axial sections of the brain were obtained from base to vertex, without the intravenous administration of contrast material. Coronal and sagittal computer generated reconstructed views are available.      Comparison is made with the prior CT of 2021.    In the interval since the previous exam the ventricles have moderately enlarged and there is prominence of temporal horns. A right parietal  shunt catheter has its tip in the midline. There is been a suboccipital craniectomy and there are postoperative changes involving the left cerebellum with a small amount of high density hemorrhage is less prominent compared to the prior exam.    IMPRESSION: Left suboccipital craniectomy and cerebellar postoperative changes. Right parietal  shunt catheter. Moderate enlargement of the ventricles compared with 2021..    Dr. Ocampo discussed these findings with Dr. Galo on 2021 11:55 AM with read back.

## 2021-12-07 NOTE — PROGRESS NOTE ADULT - SUBJECTIVE AND OBJECTIVE BOX
ENT ISSUE/POD: trach eval/ upsize     HPI: 62 yo male s/p trach 11/19/21 found to be partially self-decannulated again likely after forceful cough. Cannot pass suction catheter through the trach. pt with #6 uncuffed shiley tolerating TC ATC >3 days. Pt has been saturating well despite partially decannulated trach. PT pending OR in am so plan to change to cuffed trach.     PAST MEDICAL & SURGICAL HISTORY:  HTN (hypertension)    Diabetes mellitus      Allergies    penicillin (Other)    Intolerances      MEDICATIONS  (STANDING):  artificial  tears Solution 1 Drop(s) Both EYES every 6 hours  atorvastatin 40 milliGRAM(s) Oral at bedtime  chlorhexidine 0.12% Liquid 15 milliLiter(s) Oral Mucosa two times a day  chlorhexidine 4% Liquid 1 Application(s) Topical <User Schedule>  dextrose 50% Injectable 25 Gram(s) IV Push once  dextrose 50% Injectable 12.5 Gram(s) IV Push once  insulin lispro (ADMELOG) corrective regimen sliding scale   SubCutaneous every 6 hours  insulin NPH human recombinant 12 Unit(s) SubCutaneous every 6 hours  nystatin Powder 1 Application(s) Topical two times a day  pantoprazole  Injectable 40 milliGRAM(s) IV Push daily  sodium chloride 0.9% Bolus 500 milliLiter(s) IV Bolus once  sodium chloride 0.9%. 1000 milliLiter(s) (75 mL/Hr) IV Continuous <Continuous>  sodium chloride 3%  Inhalation 3 milliLiter(s) Inhalation every 6 hours  vancomycin    Solution 125 milliGRAM(s) Oral every 6 hours    MEDICATIONS  (PRN):  acetaminophen    Suspension .. 650 milliGRAM(s) Oral every 6 hours PRN Temp greater or equal to 38C (100.4F), Mild Pain (1 - 3)    social history: see consult     family history: see consult     ROS:   ENT: all negative except as noted in HPI   Pulm: denies SOB, cough, hemoptysis  Neuro: denies numbness/tingling, loss of sensation  Endo: denies heat/cold intolerance, excessive sweating      Vital Signs Last 24 Hrs  T(C): 36.9 (07 Dec 2021 19:00), Max: 36.9 (07 Dec 2021 08:24)  T(F): 98.5 (07 Dec 2021 19:00), Max: 98.5 (07 Dec 2021 19:00)  HR: 86 (07 Dec 2021 23:37) (86 - 95)  BP: 109/66 (07 Dec 2021 23:00) (92/58 - 145/87)  BP(mean): 77 (07 Dec 2021 23:00) (65 - 100)  RR: 14 (07 Dec 2021 23:00) (12 - 20)  SpO2: 100% (07 Dec 2021 23:37) (95% - 100%)                          8.6    10.96 )-----------( 446      ( 07 Dec 2021 06:20 )             27.5    12-07    138  |  101  |  67<H>  ----------------------------<  191<H>  4.3   |  24  |  1.43<H>    Ca    9.2      07 Dec 2021 06:20  Phos  5.3     12-07    TPro  6.6  /  Alb  3.0<L>  /  TBili  0.2  /  DBili  <0.1  /  AST  49<H>  /  ALT  62<H>  /  AlkPhos  122<H>  12-07   PT/INR - ( 07 Dec 2021 15:29 )   PT: 16.2 sec;   INR: 1.37 ratio         PTT - ( 07 Dec 2021 15:29 )  PTT:29.8 sec    PHYSICAL EXAM:  Gen: NAD  Skin: No rashes, bruises, or lesions  Head: Normocephalic, Atraumatic  Face: no edema, erythema, or fluctuance. Parotid glands soft without mass  Eyes: no scleral injection  Nose: Nares bilaterally patent, no discharge  Mouth: No Stridor / Drooling / Trismus.  Mucosa moist, tongue/uvula midline, oropharynx clear  Neck: #6 uncuffed Shiley changes to cuffed not inflated with velcro strap on trach collar, supple, no lymphadenopathy, trachea midline, no masses  Lymphatic: No lymphadenopathy  Resp: breathing easily, no stridor  Neuro: facial nerve intact, no facial droop        Procedure Note:  #6 uncuffed shiley velcro straps removed, tracheostomy tube removed. Stoma suctioned and without bleeding, erythema, or edema. #6 cuffed placed into stoma without resistance. Obturator replaced with inner cannula and locked in place. Cuff not inflated and reconnected to vent, ventilating well. Velcro straps secured. Trach placement confirmed via scope with visualization of the david. No active bleeding noted.

## 2021-12-07 NOTE — PROGRESS NOTE ADULT - PROBLEM SELECTOR PLAN 1
S/p VPS placement on 11/23.  s/p PICA aneurysm resection. s/p EVD clamped & removed.  cont mgt as per neurosurgery unclear etiology. f/u blood cx. would r/o cholecystitis given elevated LFTs, although elevation is mild and may be related to medications. would f/u ID recs

## 2021-12-07 NOTE — PROGRESS NOTE ADULT - PROBLEM SELECTOR PLAN 8
Continue labetalol 100mg BID with hold parameters  - monitor vitals s/p stent. was on DAPT per prescription refills   - resume ASA when clear from neurosurgery standpoint  - c/w statin/Labetalol s/p trach. on trach collar  - continue trach care and suctioning  - monitor O2 sats

## 2021-12-07 NOTE — PROVIDER CONTACT NOTE (CHANGE IN STATUS NOTIFICATION) - ASSESSMENT
no movement x4 not withdrawing from pain, Left pupil 3RF and right 3RS
As per report from 4 Ambrosio RN, pt pupils were 2, round, sluggish. Upon admission to NSCU, pt pupils are 3, round, fixed.
Pt reportedly withdrawing on MELANIE UE as per night nurse, pt is now flexing on MELANIE UE. MELANIE LE unchanged, pt triple flexing. Pupils 3, round, and fixed.

## 2021-12-07 NOTE — PROGRESS NOTE ADULT - ASSESSMENT
60 yo male with PMH of HTN, CAD s/p stent on DAPT, T2DM who complained of headache and subsequently become unresponsive. 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 s/p EVD clamp & removal.    NSCU course also c/b respiratory failure and dysphagia now s/p trach and PEG on 11/19. S/p VPS placement on 11/23. Completed course of cefepime for enterobacter and pseudomonas pneumonia on 11/21. Transferred out of NSCU on 11/25. Began to spike fevers with watery output from rectal tube found to be positive for c. diff, PO vanco started on 11/26.     Persistent fever, increased secretions, sputum colonized by Pseudomonas, Cefepime restarted on 11/28. Fever resolved       62 yo male with PMH of HTN, CAD s/p stent on DAPT, T2DM who complained of headache and subsequently become unresponsive. 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 s/p EVD clamp & removal.    NSCU course also c/b respiratory failure and dysphagia now s/p trach and PEG on 11/19. S/p VPS placement on 11/23. Completed course of cefepime for enterobacter and pseudomonas pneumonia on 11/21. Transferred out of NSCU on 11/25. Began to spike fevers with watery output from rectal tube found to be positive for c. diff, PO vanco started on 11/26.     Persistent fever, increased secretions, sputum colonized by Pseudomonas, Cefepime restarted on 11/28 and stopped 12/6.

## 2021-12-07 NOTE — PROGRESS NOTE ADULT - ASSESSMENT
62 yo male with PMH of HTN   Admitted on 11/4 with severe headache and found to have cerebellar bleed.  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  S/p trach and peg.   He has distal DVT's - on apixaban.  He received cefepime 11/4-11/21 for respiratory coverage.  Developed watery C diff diarrhea on 11/26 - started on oral vancomycin   Then restarted on cefepime on 11/28 for concerns of pneumonia since had low grade temps. Giorgio neb added later  But CXR's remained clear.  Ct of brain showed residual blood.  Pseudomonas in sputum found to be resistant to carbapenems & quinolones. Sputum isolated strep pneumoniae as well    11/28 urine and blood cultures are negative at 48 hours.  His trach secretions are moderate & have much improved.  Teixeira was removed on 12/06  Cefepime completed on 12/06 & Giorgio nebs on 12/07/21  Last night was noted to be in retention of 1000 ml of urine - teixeira catheter was placed  Spiked a fever to 102F  UA with 3 WBCs, no nit or LE, large blood   CXR with clear lungs    Possible transient fever yesterday was in response to retention, afebrile today    Suggest:  If fevers recur, then draw blood cx & sputum cx  Follow clinically off of systemic antibiotics for now  Continue enteral vanco for C diff, day 12/14, stools still loose  Anticoagulation per NS(apixaban)  Supportive care per NS and medicine

## 2021-12-07 NOTE — PROGRESS NOTE ADULT - PROBLEM SELECTOR PLAN 10
s/p PEG placement  - continue tube feeds HbA1c 6.5%  -CW NPH to 12 units q6h  - monitor FS q6h  - so far fasting glucose is <200 s/p stent. was on DAPT per prescription refills   - resume ASA when clear from neurosurgery standpoint  - c/w statin/Labetalol

## 2021-12-07 NOTE — PROCEDURE NOTE - NSPOSTCAREGUIDE_GEN_A_CORE
Verbal/written post procedure instructions were given to patient/caregiver/Instructed patient/caregiver to follow-up with primary care physician/Instructed patient/caregiver regarding signs and symptoms of infection/Keep the cast/splint/dressing clean and dry/Care for catheter as per unit/ICU protocols
Care for catheter as per unit/ICU protocols
Care for catheter as per unit/ICU protocols

## 2021-12-08 LAB
ANION GAP SERPL CALC-SCNC: 13 MMOL/L — SIGNIFICANT CHANGE UP (ref 5–17)
BUN SERPL-MCNC: 66 MG/DL — HIGH (ref 7–23)
CALCIUM SERPL-MCNC: 8.9 MG/DL — SIGNIFICANT CHANGE UP (ref 8.4–10.5)
CHLORIDE SERPL-SCNC: 106 MMOL/L — SIGNIFICANT CHANGE UP (ref 96–108)
CO2 SERPL-SCNC: 25 MMOL/L — SIGNIFICANT CHANGE UP (ref 22–31)
CREAT SERPL-MCNC: 1.33 MG/DL — HIGH (ref 0.5–1.3)
CRYPTOC AG CSF-ACNC: NEGATIVE — SIGNIFICANT CHANGE UP
CSF PCR RESULT: SIGNIFICANT CHANGE UP
CULTURE RESULTS: NO GROWTH — SIGNIFICANT CHANGE UP
GLUCOSE SERPL-MCNC: 116 MG/DL — HIGH (ref 70–99)
HCT VFR BLD CALC: 25.1 % — LOW (ref 39–50)
HGB BLD-MCNC: 7.9 G/DL — LOW (ref 13–17)
LMWH PPP CHRO-ACNC: 0.3 IU/ML — LOW (ref 0.5–1.1)
MAGNESIUM SERPL-MCNC: 2.7 MG/DL — HIGH (ref 1.6–2.6)
MCHC RBC-ENTMCNC: 29.9 PG — SIGNIFICANT CHANGE UP (ref 27–34)
MCHC RBC-ENTMCNC: 31.5 GM/DL — LOW (ref 32–36)
MCV RBC AUTO: 95.1 FL — SIGNIFICANT CHANGE UP (ref 80–100)
NIGHT BLUE STAIN TISS: SIGNIFICANT CHANGE UP
NRBC # BLD: 0 /100 WBCS — SIGNIFICANT CHANGE UP (ref 0–0)
PHOSPHATE SERPL-MCNC: 5 MG/DL — HIGH (ref 2.5–4.5)
PLATELET # BLD AUTO: 451 K/UL — HIGH (ref 150–400)
POTASSIUM SERPL-MCNC: 4.2 MMOL/L — SIGNIFICANT CHANGE UP (ref 3.5–5.3)
POTASSIUM SERPL-SCNC: 4.2 MMOL/L — SIGNIFICANT CHANGE UP (ref 3.5–5.3)
RBC # BLD: 2.64 M/UL — LOW (ref 4.2–5.8)
RBC # FLD: 13.5 % — SIGNIFICANT CHANGE UP (ref 10.3–14.5)
SARS-COV-2 RNA SPEC QL NAA+PROBE: SIGNIFICANT CHANGE UP
SODIUM SERPL-SCNC: 144 MMOL/L — SIGNIFICANT CHANGE UP (ref 135–145)
SPECIMEN SOURCE: SIGNIFICANT CHANGE UP
SPECIMEN SOURCE: SIGNIFICANT CHANGE UP
WBC # BLD: 10.62 K/UL — HIGH (ref 3.8–10.5)
WBC # FLD AUTO: 10.62 K/UL — HIGH (ref 3.8–10.5)

## 2021-12-08 PROCEDURE — 93970 EXTREMITY STUDY: CPT | Mod: 26

## 2021-12-08 PROCEDURE — 99233 SBSQ HOSP IP/OBS HIGH 50: CPT

## 2021-12-08 PROCEDURE — 99291 CRITICAL CARE FIRST HOUR: CPT

## 2021-12-08 PROCEDURE — 74018 RADEX ABDOMEN 1 VIEW: CPT | Mod: 26

## 2021-12-08 RX ADMIN — Medication 650 MILLIGRAM(S): at 01:00

## 2021-12-08 RX ADMIN — CHLORHEXIDINE GLUCONATE 15 MILLILITER(S): 213 SOLUTION TOPICAL at 05:14

## 2021-12-08 RX ADMIN — Medication 650 MILLIGRAM(S): at 00:47

## 2021-12-08 RX ADMIN — NYSTATIN CREAM 1 APPLICATION(S): 100000 CREAM TOPICAL at 05:13

## 2021-12-08 RX ADMIN — SODIUM CHLORIDE 3 MILLILITER(S): 9 INJECTION INTRAMUSCULAR; INTRAVENOUS; SUBCUTANEOUS at 23:41

## 2021-12-08 RX ADMIN — SODIUM CHLORIDE 3 MILLILITER(S): 9 INJECTION INTRAMUSCULAR; INTRAVENOUS; SUBCUTANEOUS at 17:52

## 2021-12-08 RX ADMIN — Medication 125 MILLIGRAM(S): at 11:54

## 2021-12-08 RX ADMIN — Medication 1 DROP(S): at 17:41

## 2021-12-08 RX ADMIN — ATORVASTATIN CALCIUM 40 MILLIGRAM(S): 80 TABLET, FILM COATED ORAL at 23:14

## 2021-12-08 RX ADMIN — Medication 1 DROP(S): at 05:14

## 2021-12-08 RX ADMIN — NYSTATIN CREAM 1 APPLICATION(S): 100000 CREAM TOPICAL at 17:41

## 2021-12-08 RX ADMIN — CHLORHEXIDINE GLUCONATE 15 MILLILITER(S): 213 SOLUTION TOPICAL at 17:41

## 2021-12-08 RX ADMIN — Medication 1 DROP(S): at 12:50

## 2021-12-08 RX ADMIN — SODIUM CHLORIDE 3 MILLILITER(S): 9 INJECTION INTRAMUSCULAR; INTRAVENOUS; SUBCUTANEOUS at 05:33

## 2021-12-08 RX ADMIN — PANTOPRAZOLE SODIUM 40 MILLIGRAM(S): 20 TABLET, DELAYED RELEASE ORAL at 11:54

## 2021-12-08 RX ADMIN — Medication 125 MILLIGRAM(S): at 17:41

## 2021-12-08 RX ADMIN — Medication 125 MILLIGRAM(S): at 05:42

## 2021-12-08 RX ADMIN — SODIUM CHLORIDE 3 MILLILITER(S): 9 INJECTION INTRAMUSCULAR; INTRAVENOUS; SUBCUTANEOUS at 11:52

## 2021-12-08 NOTE — PROGRESS NOTE ADULT - ASSESSMENT
61 year old man with respiratory failure d/t ICH    1. ICH  -per neurosurgery, s/p EVD  -for  shunt  -OR for LED today  -tube feeds held    2. Respiratory failure  -for tracheostomy, will require long term enteral nutrition  -s/p PEG, tolerating feeds; feeds currently held for OR  - aspiration precautions     3. Enterobacter PNA  -s/p course of antibiotics     4. afib with RVR    5. Anemia, no overt GI bleed. EGD unremarkable.  -trend CBC    6. Cdiff infection on PO vanco  -add banatrol for possible component of tube feeds contributing to diarrhea  -no active diarrhea  -continue cdiff tx course?    7. DVT on a/c  -would give PPI     Attending supervision statement: I have personally seen and examined the patient. I fully participated in the care of this patient. I have made amendments to the documentation where necessary, and agree with the history, physical exam, and plan as outlined by the ACP.    [facility]  Gastroenterology and Hepatology  [address] [place], [place]  Office: [phone]  Cell: [phone]   61 year old man with respiratory failure d/t ICH    1. ICH  -per neurosurgery, s/p EVD  -for  shunt  -OR for LED today  -tube feeds held    2. Respiratory failure  -for tracheostomy, will require long term enteral nutrition  -s/p PEG, tolerating feeds; feeds currently held for OR  - aspiration precautions     3. Enterobacter PNA  -s/p course of antibiotics     4. afib with RVR    5. Anemia, no overt GI bleed. EGD unremarkable.  -trend CBC    6. Cdiff infection on PO vanco  -add banatrol for possible component of tube feeds contributing to diarrhea  -no active diarrhea; continue cdiff tx course?    7. DVT on a/c  -continue PPI     Attending supervision statement: I have personally seen and examined the patient. I fully participated in the care of this patient. I have made amendments to the documentation where necessary, and agree with the history, physical exam, and plan as outlined by the ACP.    [facility]  Gastroenterology and Hepatology  [address] [place], [place]  Office: [phone]  Cell: [phone]   61 year old man with respiratory failure d/t ICH    1. ICH  -per neurosurgery, s/p EVD  -for  shunt  -OR for LED today  -tube feeds held    2. Respiratory failure  -for tracheostomy, will require long term enteral nutrition  -s/p PEG, tolerating feeds; feeds currently held for OR  - aspiration precautions     3. Enterobacter PNA  -s/p course of antibiotics     4. afib with RVR    5. Anemia, no overt GI bleed. EGD unremarkable.  -trend CBC    6. Cdiff infection on PO vanco  -add banatrol for possible component of tube feeds contributing to diarrhea  -day 13/14 of abx    7. DVT on a/c  -continue PPI     Attending supervision statement: I have personally seen and examined the patient. I fully participated in the care of this patient. I have made amendments to the documentation where necessary, and agree with the history, physical exam, and plan as outlined by the ACP.    [facility]  Gastroenterology and Hepatology  [address] [place], [place]  Office: [phone]  Cell: [phone]

## 2021-12-08 NOTE — PROGRESS NOTE ADULT - ATTENDING COMMENTS
Duplex shows no propagation  Holding eliquis   For Nsx tomorrow  Resume DVT ppx post op and repet duplex in 2-3 days to assure stability  If propagates, will need to discuss IVC filter      Aurea [phone]

## 2021-12-08 NOTE — PROGRESS NOTE ADULT - SUBJECTIVE AND OBJECTIVE BOX
SUBJECTIVE: 60 yo male with PMH of HTN, CAD s/p stent on DAPT, T2DM who complained of headache and subsequently become unresponsive. 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 s/p EVD clamp & removal.    NSCU course also c/b respiratory failure and dysphagia now s/p trach and PEG on 11/19. S/p Certas@4 VPS placement on 11/23. Completed course of cefepime for enterobacter and pseudomonas pneumonia on 11/21. Transferred out of NSCU on 11/25. Began to spike fevers with watery output from rectal tube found to be positive for c. diff, PO vanco started on 11/26.  to finish in 2 days.   Persistent fever, increased secretions, sputum colonized by Pseudomonas, Cefepime restarted on 11/28 and stopped 12/6.     24H events:  coughing trach out, ENT came to place cuffed trach for OR. Needs LED  OR today, awaiting Xa levels      PHYSICAL EXAM:     Constitutional: No Acute Distress   Neurological: AOx0 no verbal output, not FC, EO spont, pupils NR, no nystagmus, Downward Gaze, not tracking, R corneal, L no corneal, +cough and gag, LUE tries to Loc, RUE nothing to nox, BLE TF  Pulmonary: trached to collar. Clear to Auscultation, No rales, No rhonchi, No wheezes   Cardiovascular: S1, S2, Regular rate and rhythm   Gastrointestinal: Soft, Non-tender, Non-distended, +bowel sounds x 4  Extremities: No calf tenderness bilaterally, no cyanosis, clubbing or edema       SUBJECTIVE: 62 yo male with PMH of HTN, CAD s/p stent on DAPT, T2DM who complained of headache and subsequently become unresponsive. 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 s/p EVD clamp & removal.    NSCU course also c/b respiratory failure and dysphagia now s/p trach and PEG on 11/19. S/p Certas@4 VPS placement on 11/23. Completed course of cefepime for enterobacter and pseudomonas pneumonia on 11/21. Transferred out of NSCU on 11/25. Began to spike fevers with watery output from rectal tube found to be positive for c. diff, PO vanco started on 11/26.  to finish in 2 days.   Persistent fever, increased secretions, sputum colonized by Pseudomonas, Cefepime restarted on 11/28 and stopped 12/6.     24H events:  coughing trach out, ENT came to place cuffed trach for OR. Needs LED  OR today, awaiting Xa levels      PHYSICAL EXAM:     Constitutional: No Acute Distress   Neurological: AOx0 no verbal output, not FC, EO to nox, pupils NR, no nystagmus, slight Downward Gaze, not tracking, weak R corneal, L no corneal, +cough and gag, LUE no mov to nox, RUE trace movement to nox, BLE spont movement  Pulmonary: trached to collar. Clear to Auscultation, No rales, No rhonchi, No wheezes   Cardiovascular: S1, S2, Regular rate and rhythm   Gastrointestinal: Soft, Non-tender, Non-distended, +bowel sounds x 4  Extremities: No calf tenderness bilaterally, no cyanosis, clubbing or edema

## 2021-12-08 NOTE — PROGRESS NOTE ADULT - SUBJECTIVE AND OBJECTIVE BOX
Chief Complaint:  Patient is a 61y old  Male who presents with a chief complaint of ICH (08 Dec 2021 08:58)      Date of service 21 @ 13:29      Interval Events:   Patient seen and examined. Patient coughed trach out; ENT came to place cuffed trach for OR. Needs LED today. Tube feeds on hold for procedure. No episodes of diarrhea.       Hospital Medications:  acetaminophen    Suspension .. 650 milliGRAM(s) Oral every 6 hours PRN  artificial  tears Solution 1 Drop(s) Both EYES every 6 hours  atorvastatin 40 milliGRAM(s) Oral at bedtime  chlorhexidine 0.12% Liquid 15 milliLiter(s) Oral Mucosa two times a day  chlorhexidine 4% Liquid 1 Application(s) Topical <User Schedule>  dextrose 50% Injectable 25 Gram(s) IV Push once  dextrose 50% Injectable 12.5 Gram(s) IV Push once  insulin lispro (ADMELOG) corrective regimen sliding scale   SubCutaneous every 6 hours  insulin NPH human recombinant 12 Unit(s) SubCutaneous every 6 hours  nystatin Powder 1 Application(s) Topical two times a day  pantoprazole  Injectable 40 milliGRAM(s) IV Push daily  sodium chloride 0.9%. 1000 milliLiter(s) IV Continuous <Continuous>  sodium chloride 3%  Inhalation 3 milliLiter(s) Inhalation every 6 hours  vancomycin    Solution 125 milliGRAM(s) Oral every 6 hours        Review of Systems:  unable to obtain. patient intubated.       PHYSICAL EXAM:   Vital Signs:  Vital Signs Last 24 Hrs  T(C): 37.4 (08 Dec 2021 08:00), Max: 37.4 (08 Dec 2021 08:00)  T(F): 99.3 (08 Dec 2021 08:00), Max: 99.3 (08 Dec 2021 08:00)  HR: 91 (08 Dec 2021 13:00) (80 - 108)  BP: 114/65 (08 Dec 2021 13:00) (92/58 - 152/74)  BP(mean): 75 (08 Dec 2021 13:00) (65 - 100)  RR: 13 (08 Dec 2021 13:00) (12 - 28)  SpO2: 100% (08 Dec 2021 13:00) (100% - 100%)  Daily     Daily       PHYSICAL EXAM:     GENERAL:  Appears stated age, well-groomed, well-nourished, no distress  HEENT:  NC/AT,  conjunctivae anicteric, clear and pink,   NECK: supple, trachea midline  CHEST:  Full & symmetric excursion, no increased effort, breath sounds clear  HEART:  Regular rhythm, no JVD  ABDOMEN:  Soft, non-tender, non-distended, normoactive bowel sounds,  no masses , no hepatosplenomegaly, +PEG  EXTREMITIES:  no cyanosis,clubbing or edema  SKIN:  No rash, erythema, or, ecchymoses, no jaundice  NEURO:  intubated, sedated, non-focal, no asterixis  RECTAL: Deferred      LABS Personally reviewed by me:                        7.9    10.62 )-----------( 451      ( 08 Dec 2021 00:41 )             25.1     Mean Cell Volume: 95.1 fl (- @ 00:41)        144  |  106  |  66<H>  ----------------------------<  116<H>  4.2   |  25  |  1.33<H>    Ca    8.9      08 Dec 2021 00:41  Phos  5.0       Mg     2.7         TPro  6.6  /  Alb  3.0<L>  /  TBili  0.2  /  DBili  <0.1  /  AST  49<H>  /  ALT  62<H>  /  AlkPhos  122<H>      LIVER FUNCTIONS - ( 07 Dec 2021 06:20 )  Alb: 3.0 g/dL / Pro: 6.6 g/dL / ALK PHOS: 122 U/L / ALT: 62 U/L / AST: 49 U/L / GGT: x           PT/INR - ( 07 Dec 2021 15:29 )   PT: 16.2 sec;   INR: 1.37 ratio         PTT - ( 07 Dec 2021 15:29 )  PTT:29.8 sec  Urinalysis Basic - ( 06 Dec 2021 18:37 )    Color: Yellow / Appearance: Slightly Turbid / S.023 / pH: x  Gluc: x / Ketone: Trace  / Bili: Negative / Urobili: Negative   Blood: x / Protein: 100 mg/dL / Nitrite: Negative   Leuk Esterase: Negative / RBC: 112 /hpf / WBC 3 /HPF   Sq Epi: x / Non Sq Epi: 2 /hpf / Bacteria: Negative                              7.9    10.62 )-----------( 451      ( 08 Dec 2021 00:41 )             25.1                         8.6    10.96 )-----------( 446      ( 07 Dec 2021 06:20 )             27.5                         9.3    10.73 )-----------( 450      ( 05 Dec 2021 17:42 )             29.6       Imaging personally reviewed by me:             Chief Complaint:  Patient is a 61y old  Male who presents with a chief complaint of ICH (08 Dec 2021 08:58)      Date of service 21 @ 13:29      Interval Events:   Patient seen and examined. Patient coughed trach out; ENT came to place cuffed trach for OR. Needs LED today. Tube feeds on hold for procedure. No episodes of diarrhea.       Hospital Medications:  acetaminophen    Suspension .. 650 milliGRAM(s) Oral every 6 hours PRN  artificial  tears Solution 1 Drop(s) Both EYES every 6 hours  atorvastatin 40 milliGRAM(s) Oral at bedtime  chlorhexidine 0.12% Liquid 15 milliLiter(s) Oral Mucosa two times a day  chlorhexidine 4% Liquid 1 Application(s) Topical <User Schedule>  dextrose 50% Injectable 25 Gram(s) IV Push once  dextrose 50% Injectable 12.5 Gram(s) IV Push once  insulin lispro (ADMELOG) corrective regimen sliding scale   SubCutaneous every 6 hours  insulin NPH human recombinant 12 Unit(s) SubCutaneous every 6 hours  nystatin Powder 1 Application(s) Topical two times a day  pantoprazole  Injectable 40 milliGRAM(s) IV Push daily  sodium chloride 0.9%. 1000 milliLiter(s) IV Continuous <Continuous>  sodium chloride 3%  Inhalation 3 milliLiter(s) Inhalation every 6 hours  vancomycin    Solution 125 milliGRAM(s) Oral every 6 hours        Review of Systems:  unable to obtain. trached      PHYSICAL EXAM:   Vital Signs:  Vital Signs Last 24 Hrs  T(C): 37.4 (08 Dec 2021 08:00), Max: 37.4 (08 Dec 2021 08:00)  T(F): 99.3 (08 Dec 2021 08:00), Max: 99.3 (08 Dec 2021 08:00)  HR: 91 (08 Dec 2021 13:00) (80 - 108)  BP: 114/65 (08 Dec 2021 13:00) (92/58 - 152/74)  BP(mean): 75 (08 Dec 2021 13:00) (65 - 100)  RR: 13 (08 Dec 2021 13:00) (12 - 28)  SpO2: 100% (08 Dec 2021 13:00) (100% - 100%)  Daily     Daily       PHYSICAL EXAM:     GENERAL:  Appears stated age, well-groomed, well-nourished, no distress  HEENT:  NC/AT,  conjunctivae anicteric, clear and pink,   NECK: supple, trachea midline  CHEST:  Full & symmetric excursion, no increased effort, breath sounds clear  HEART:  Regular rhythm, no JVD  ABDOMEN:  Soft, non-tender, non-distended, normoactive bowel sounds,  no masses , no hepatosplenomegaly, +PEG  EXTREMITIES:  no cyanosis,clubbing or edema  SKIN:  No rash, erythema, or, ecchymoses, no jaundice  NEURO:  non-focal, no asterixis  RECTAL: Deferred      LABS Personally reviewed by me:                        7.9    10.62 )-----------( 451      ( 08 Dec 2021 00:41 )             25.1     Mean Cell Volume: 95.1 fl (-21 @ 00:41)        144  |  106  |  66<H>  ----------------------------<  116<H>  4.2   |  25  |  1.33<H>    Ca    8.9      08 Dec 2021 00:41  Phos  5.0       Mg     2.7         TPro  6.6  /  Alb  3.0<L>  /  TBili  0.2  /  DBili  <0.1  /  AST  49<H>  /  ALT  62<H>  /  AlkPhos  122<H>      LIVER FUNCTIONS - ( 07 Dec 2021 06:20 )  Alb: 3.0 g/dL / Pro: 6.6 g/dL / ALK PHOS: 122 U/L / ALT: 62 U/L / AST: 49 U/L / GGT: x           PT/INR - ( 07 Dec 2021 15:29 )   PT: 16.2 sec;   INR: 1.37 ratio         PTT - ( 07 Dec 2021 15:29 )  PTT:29.8 sec  Urinalysis Basic - ( 06 Dec 2021 18:37 )    Color: Yellow / Appearance: Slightly Turbid / S.023 / pH: x  Gluc: x / Ketone: Trace  / Bili: Negative / Urobili: Negative   Blood: x / Protein: 100 mg/dL / Nitrite: Negative   Leuk Esterase: Negative / RBC: 112 /hpf / WBC 3 /HPF   Sq Epi: x / Non Sq Epi: 2 /hpf / Bacteria: Negative                              7.9    10.62 )-----------( 451      ( 08 Dec 2021 00:41 )             25.1                         8.6    10.96 )-----------( 446      ( 07 Dec 2021 06:20 )             27.5                         9.3    10.73 )-----------( 450      ( 05 Dec 2021 17:42 )             29.6       Imaging personally reviewed by me:

## 2021-12-08 NOTE — PROGRESS NOTE ADULT - SUBJECTIVE AND OBJECTIVE BOX
Vascular Cardiology  Progress note  EMAIL [email or web address]     OFFICE [phone]    INTERVAL HISTORY:  -Transferred to NSICU; likely to go to OR tomorrow for VPS revision in light of new pseudomeningoceal.  -Eliquis has been discontinued; now waiting for DOAC to washout.     Allergies  penicillin (Other)  	  MEDICATIONS:  vancomycin    Solution 125 milliGRAM(s) Oral every 6 hours  sodium chloride 3%  Inhalation 3 milliLiter(s) Inhalation every 6 hours  acetaminophen    Suspension .. 650 milliGRAM(s) Oral every 6 hours PRN  pantoprazole  Injectable 40 milliGRAM(s) IV Push daily  atorvastatin 40 milliGRAM(s) Oral at bedtime  dextrose 50% Injectable 25 Gram(s) IV Push once  dextrose 50% Injectable 12.5 Gram(s) IV Push once  insulin lispro (ADMELOG) corrective regimen sliding scale   SubCutaneous every 6 hours  insulin NPH human recombinant 12 Unit(s) SubCutaneous every 6 hours  artificial  tears Solution 1 Drop(s) Both EYES every 6 hours  chlorhexidine 0.12% Liquid 15 milliLiter(s) Oral Mucosa two times a day  chlorhexidine 4% Liquid 1 Application(s) Topical <User Schedule>  nystatin Powder 1 Application(s) Topical two times a day  sodium chloride 0.9%. 1000 milliLiter(s) IV Continuous <Continuous>    PAST MEDICAL & SURGICAL HISTORY:  HTN (hypertension)  Diabetes mellitus    FAMILY HISTORY:    SOCIAL HISTORY:  unchanged    REVIEW OF SYSTEMS:  -Limited by patient factors; patient is nonverbal  [x] Unable to obtain    PHYSICAL EXAM:  T(C): 37.4 (12-08-21 @ 08:00), Max: 37.4 (12-08-21 @ 08:00)  HR: 108 (12-08-21 @ 11:56) (80 - 108)  BP: 115/62 (12-08-21 @ 11:00) (92/58 - 152/74)  RR: 15 (12-08-21 @ 11:00) (12 - 28)  SpO2: 100% (12-08-21 @ 11:56) (100% - 100%)  Wt(kg): --  I&O's Summary    07 Dec 2021 07:01  -  08 Dec 2021 07:00  --------------------------------------------------------  IN: 1625 mL / OUT: 1080 mL / NET: 545 mL    08 Dec 2021 07:01  -  08 Dec 2021 12:26  --------------------------------------------------------  IN: 375 mL / OUT: 475 mL / NET: -100 mL    Appearance: Frail appearing, nonverbal, with trach and PEG  HEENT:   Normal oral mucosa, PERRL, EOMI	  Lymphatic: No lymphadenopathy  Cardiovascular:  S1, S2, RRR, no RMG  Respiratory:  CTA b/l  Psychiatry:  Nonverbal  Gastrointestinal:  Soft, Non-tender, + BS	  Skin: No rashes, No ecchymoses, No cyanosis	  Extremities:  B/l LE with no edema, asymmetry of girth, skin changes; extremities are warm with no ulcers or wounds, no phlegmasia    Vascular Pulse Exam:  Right DP: [x]palpable []non-palpable []audible      Left DP :   [x]palpable []non-palpable []audible  Right PT: [x]palpable [] non-palpable []audible   Left PT:  [x] palpable [] non-palpable []audible      LABS:	 	    CBC Full  -  ( 08 Dec 2021 00:41 )  WBC Count : 10.62 K/uL  Hemoglobin : 7.9 g/dL  Hematocrit : 25.1 %  Platelet Count - Automated : 451 K/uL  Mean Cell Volume : 95.1 fl  Mean Cell Hemoglobin : 29.9 pg  Mean Cell Hemoglobin Concentration : 31.5 gm/dL  Auto Neutrophil # : x  Auto Lymphocyte # : x  Auto Monocyte # : x  Auto Eosinophil # : x  Auto Basophil # : x  Auto Neutrophil % : x  Auto Lymphocyte % : x  Auto Monocyte % : x  Auto Eosinophil % : x  Auto Basophil % : x    12-08    144  |  106  |  66<H>  ----------------------------<  116<H>  4.2   |  25  |  1.33<H>  12-07    138  |  101  |  67<H>  ----------------------------<  191<H>  4.3   |  24  |  1.43<H>    Ca    8.9      08 Dec 2021 00:41  Ca    9.2      07 Dec 2021 06:20  Phos  5.0     12-08  Phos  5.3     12-07  Mg     2.7     12-08    TPro  6.6  /  Alb  3.0<L>  /  TBili  0.2  /  DBili  <0.1  /  AST  49<H>  /  ALT  62<H>  /  AlkPhos  122<H>  12-07    Assessment:  1. 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 EVD clamped & removed.  2. C. difficile diarrhea  --PCR positive. likely 2/2 to Abx use  3. Gram-negative pneumonia.   --s/p 7 day course of cefepime on 11/21; was started on levofloxacin for 11/24 sputum cx with pseudomonas  4. Respiratory failure.   --s/p trach. on trach collar  5. B/l below knee DVTs  --Right soleal vein DVT and persistent left posterior tibial, peroneal, gastrocnemius and soleal vein DVT without proximal propagation on last duplex on 11/24    8. CAD   --s/p stent and on DAPT  --ASA to be resumed when cleared by NSG; on statin    Plan:  1. Repeat dopplers do not show any propagation of pre-existing LE thrombosis. Eliquis discontinued in light of OR tomorrow for VPS revision. Okay to use DVT PPx dosing if cleared by NSG.  2. Watch hemoglobin  3.  Nsx followup  4.  GOC discussions    Thank you,    Please call with any questions:   Service Line: [phone]  Office [phone]  email:  [email or web address]     Vascular Cardiology  Progress note  EMAIL [email or web address]     OFFICE [phone]    INTERVAL HISTORY:  -Transferred to NSICU; likely to go to OR tomorrow for VPS revision in light of new pseudomeningoceal.  -Eliquis has been discontinued; now waiting for DOAC to washout.     Allergies  penicillin (Other)  	  MEDICATIONS:  vancomycin    Solution 125 milliGRAM(s) Oral every 6 hours  sodium chloride 3%  Inhalation 3 milliLiter(s) Inhalation every 6 hours  acetaminophen    Suspension .. 650 milliGRAM(s) Oral every 6 hours PRN  pantoprazole  Injectable 40 milliGRAM(s) IV Push daily  atorvastatin 40 milliGRAM(s) Oral at bedtime  dextrose 50% Injectable 25 Gram(s) IV Push once  dextrose 50% Injectable 12.5 Gram(s) IV Push once  insulin lispro (ADMELOG) corrective regimen sliding scale   SubCutaneous every 6 hours  insulin NPH human recombinant 12 Unit(s) SubCutaneous every 6 hours  artificial  tears Solution 1 Drop(s) Both EYES every 6 hours  chlorhexidine 0.12% Liquid 15 milliLiter(s) Oral Mucosa two times a day  chlorhexidine 4% Liquid 1 Application(s) Topical <User Schedule>  nystatin Powder 1 Application(s) Topical two times a day  sodium chloride 0.9%. 1000 milliLiter(s) IV Continuous <Continuous>    PAST MEDICAL & SURGICAL HISTORY:  HTN (hypertension)  Diabetes mellitus    FAMILY HISTORY:    SOCIAL HISTORY:  unchanged    REVIEW OF SYSTEMS:  -Limited by patient factors; patient is nonverbal  [x] Unable to obtain    PHYSICAL EXAM:  T(C): 37.4 (12-08-21 @ 08:00), Max: 37.4 (12-08-21 @ 08:00)  HR: 108 (12-08-21 @ 11:56) (80 - 108)  BP: 115/62 (12-08-21 @ 11:00) (92/58 - 152/74)  RR: 15 (12-08-21 @ 11:00) (12 - 28)  SpO2: 100% (12-08-21 @ 11:56) (100% - 100%)  Wt(kg): --  I&O's Summary    07 Dec 2021 07:01  -  08 Dec 2021 07:00  --------------------------------------------------------  IN: 1625 mL / OUT: 1080 mL / NET: 545 mL    08 Dec 2021 07:01  -  08 Dec 2021 12:26  --------------------------------------------------------  IN: 375 mL / OUT: 475 mL / NET: -100 mL    Appearance: Frail appearing, nonverbal, with trach and PEG  HEENT:   Normal oral mucosa, PERRL, EOMI	  Lymphatic: No lymphadenopathy  Cardiovascular:  S1, S2, RRR, no RMG  Respiratory:  CTA b/l  Psychiatry:  Nonverbal  Gastrointestinal:  Soft, Non-tender, + BS	  Skin: No rashes, No ecchymoses, No cyanosis	  Extremities:  B/l LE with no edema, asymmetry of girth, skin changes; extremities are warm with no ulcers or wounds, no phlegmasia    Vascular Pulse Exam:  Right DP: [x]palpable []non-palpable []audible      Left DP :   [x]palpable []non-palpable []audible  Right PT: [x]palpable [] non-palpable []audible   Left PT:  [x] palpable [] non-palpable []audible      LABS:	 	    CBC Full  -  ( 08 Dec 2021 00:41 )  WBC Count : 10.62 K/uL  Hemoglobin : 7.9 g/dL  Hematocrit : 25.1 %  Platelet Count - Automated : 451 K/uL  Mean Cell Volume : 95.1 fl  Mean Cell Hemoglobin : 29.9 pg  Mean Cell Hemoglobin Concentration : 31.5 gm/dL  Auto Neutrophil # : x  Auto Lymphocyte # : x  Auto Monocyte # : x  Auto Eosinophil # : x  Auto Basophil # : x  Auto Neutrophil % : x  Auto Lymphocyte % : x  Auto Monocyte % : x  Auto Eosinophil % : x  Auto Basophil % : x    12-08    144  |  106  |  66<H>  ----------------------------<  116<H>  4.2   |  25  |  1.33<H>  12-07    138  |  101  |  67<H>  ----------------------------<  191<H>  4.3   |  24  |  1.43<H>    Ca    8.9      08 Dec 2021 00:41  Ca    9.2      07 Dec 2021 06:20  Phos  5.0     12-08  Phos  5.3     12-07  Mg     2.7     12-08    TPro  6.6  /  Alb  3.0<L>  /  TBili  0.2  /  DBili  <0.1  /  AST  49<H>  /  ALT  62<H>  /  AlkPhos  122<H>  12-07    Assessment:  1. 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 EVD clamped & removed.  2. C. difficile diarrhea  --PCR positive. likely 2/2 to Abx use  3. Gram-negative pneumonia.   --s/p 7 day course of cefepime on 11/21; was started on levofloxacin for 11/24 sputum cx with pseudomonas  4. Respiratory failure.   --s/p trach. on trach collar  5. B/l below knee DVTs  --Right soleal vein DVT and persistent left posterior tibial, peroneal, gastrocnemius and soleal vein DVT without proximal propagation on last duplex on 11/24    8. CAD   --s/p stent and on DAPT  --ASA to be resumed when cleared by NSG; on statin    Plan:  1. Repeat dopplers do not show any propagation of pre-existing LE thrombosis. Eliquis discontinued in light of OR tomorrow for VPS revision. Okay to use DVT PPx dosing if cleared by NSG.  2. Watch hemoglobin  3.  Nsx followup       Thank you,    Please call with any questions:   Service Line: [phone]  Office [phone]  email:  [email or web address]

## 2021-12-08 NOTE — PROGRESS NOTE ADULT - ASSESSMENT
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 s/p EVD clamp & removal. NSCU course also c/b respiratory failure and dysphagia now s/p trach and PEG on 11/19. S/p Certas@4 VPS placement on 11/23. Completed course of cefepime for enterobacter and pseudomonas pneumonia on 11/21. Transferred out of NSCU on 11/25. Began to spike fevers with watery output from rectal tube found to be positive for c. diff, PO vanco started on 11/26.  to finish in 2 days.   Persistent fever, increased secretions, sputum colonized by Pseudomonas, Cefepime restarted on 11/28 and stopped 12/6.   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 Moved to ICU w plan to do  shunt revision tomorrow      NEURO:  OR today possibly, NSGY wants to wait for eliquis to be washed out  [id number]  FU CSF studies  VPS certas@4 shunt management per NSGY      PULM: trach 6 josi uncuffed to collar, now has cuffed trach  cont suctions    CV: lipitor  Keep -160mmHg  labetalol     RENAL: MICHELLE likely prerenal from retaining, monitor IOs teixeira  Net negative would give bolus, NS @ 75  cont free water bolus 250 q6    GI: on PPI per GI  see below for c diff plan  PEG feeds on hold  Bowel regimen standing    ENDO: half insulin dose while NPO for OR  cont glucose checks  Goal euglycemia (-180)    HEME/ONC:  VTE prophylaxis: [x] SCDs hold eliquis for OR    ID:  C diff colitis, on oral vanco and having fevers continue treatment  ID following  Monitor off abx, FU CSF cultures if positive start empiric abx      MISC:    SOCIAL/FAMILY:  [x] awaiting [] updated at bedside [] family meeting    CODE STATUS:  [] Full Code [x] DNR [] DNI [] Palliative/Comfort Care    DISPOSITION:  [x] ICU [] Stroke Unit [] Floor [] EMU [] RCU [] PCU    [x] Patient is at high risk of neurologic deterioration/death due to: hydrocephalus, brain compression      Contact: [phone]   ASSESSMENT: 62 yo male with PMH of HTN, CAD s/p stent on DAPT, T2DM who had CT head with posterior fossa hemorrhage c/b severe IVH with casting of the 4th and 3rd ventricles with hydrocephalus, s/p EVD and SOC, 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 s/p EVD clamp & removal. NSCU course also c/b respiratory failure and dysphagia now s/p trach and PEG on 11/19. S/p Certas@4 VPS placement on 11/23. Completed course of cefepime for enterobacter and pseudomonas pneumonia on 11/21. Transferred out of NSCU on 11/25. Began to spike fevers with watery output from rectal tube found to be positive for c. diff, PO vanco started on 11/26.  to finish in 2 days.   Persistent fever, increased secretions, sputum colonized by Pseudomonas, Cefepime restarted on 11/28 and stopped 12/6.   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 Moved to ICU w plan to do  shunt revision tomorrow      NEURO:  No OR plan today, NSGY wants to wait for eliquis to be washed out, keep holding  [id number]  CTH tomorrow  FU CSF studies  VPS certas@4 shunt management per NSGY      PULM: trach 6 josi uncuffed to collar, now has cuffed trach  cont suctions  cont trach collar as tolerated    CV: lipitor  Keep -160mmHg  labetalol off    RENAL: MICHELLE likely  from retaining, monitor IOs teixeira  Net negative would give bolus, NS @ 75  cont free water bolus 250 q6    GI: on PPI per GI  see below for c diff plan  PEG feeds resume  Bowel regimen standing    ENDO: Cont insulin   cont glucose checks  Goal euglycemia (-180)    HEME/ONC:  VTE prophylaxis: [x] SCDs hold eliquis for OR    ID:  C diff colitis, on oral vanco and having fevers continue treatment  ID following  Monitor off abx, FU CSF cultures if positive start empiric abx      MISC:    SOCIAL/FAMILY:  [x] awaiting [] updated at bedside [] family meeting    CODE STATUS:  [] Full Code [x] DNR [] DNI [] Palliative/Comfort Care    DISPOSITION:  [x] ICU [] Stroke Unit [] Floor [] EMU [] RCU [] PCU    [x] Patient is at high risk of neurologic deterioration/death due to: hydrocephalus, brain compression      Contact: [phone]

## 2021-12-08 NOTE — PROGRESS NOTE ADULT - SUBJECTIVE AND OBJECTIVE BOX
NSCU EVENING NOTE -- ADDITIONAL PROGRESS NOTE    Nighttime rounds were performed -- please refer to earlier Progress Note for HPI details.    T(C): 37 (12-08-21 @ 12:00), Max: 37.4 (12-08-21 @ 08:00)  HR: 87 (12-08-21 @ 19:00) (80 - 108)  BP: 130/69 (12-08-21 @ 19:00) (94/57 - 152/74)  RR: 13 (12-08-21 @ 19:00) (10 - 28)  SpO2: 100% (12-08-21 @ 19:00) (100% - 100%)  Wt(kg): --    Relevant labwork and imaging reviewed.    Patient remains critically ill.    [A/P]    Imaging with increased Pseudomeningocele with possible shunt failure s/p bedside tap; plan per NSG for shunt revision, if not plan for repeat imaging and possible floor txer if no intervention.    Additional 30 minutes of critical care time. NSCU EVENING NOTE -- ADDITIONAL PROGRESS NOTE    Nighttime rounds were performed -- please refer to earlier Progress Note for HPI details.    T(C): 37 (12-08-21 @ 12:00), Max: 37.4 (12-08-21 @ 08:00)  HR: 87 (12-08-21 @ 19:00) (80 - 108)  BP: 130/69 (12-08-21 @ 19:00) (94/57 - 152/74)  RR: 13 (12-08-21 @ 19:00) (10 - 28)  SpO2: 100% (12-08-21 @ 19:00) (100% - 100%)  Wt(kg): --    Relevant labwork and imaging reviewed.    [A/P]    Imaging with increased Pseudomeningocele with possible shunt failure s/p bedside tap; plan per NSG for shunt revision, if not plan for repeat imaging and possible floor txer if no intervention.

## 2021-12-08 NOTE — PRE-OP CHECKLIST - SELECT TESTS ORDERED
BMP/CBC/COVID-19
BMP/CBC/PT/PTT/INR/Type and Screen/CXR/COVID-19
BMP/CBC/PT/PTT/INR/Type and Screen/COVID-19
BMP/CBC/PT/PTT/INR/Type and Screen/COVID-19
CBC/CMP/PT/PTT/INR/Type and Screen/COVID-19
BMP/CBC/PT/PTT/INR/Type and Screen

## 2021-12-08 NOTE — PROGRESS NOTE ADULT - SUBJECTIVE AND OBJECTIVE BOX
CC: f/u for C Diff and post op infections    Patient reports: he is poorly interactive.He is s/p tap of sub occipital fluid collection yesterday, GS with wbc but no organisms.    REVIEW OF SYSTEMS:  All other review of systems negative (Comprehensive ROS): limited by condition    Antimicrobials Day #  :day   vancomycin    Solution 125 milliGRAM(s) Oral every 6 hours    Other Medications Reviewed  MEDICATIONS  (STANDING):  artificial  tears Solution 1 Drop(s) Both EYES every 6 hours  atorvastatin 40 milliGRAM(s) Oral at bedtime  chlorhexidine 0.12% Liquid 15 milliLiter(s) Oral Mucosa two times a day  chlorhexidine 4% Liquid 1 Application(s) Topical <User Schedule>  dextrose 50% Injectable 25 Gram(s) IV Push once  dextrose 50% Injectable 12.5 Gram(s) IV Push once  insulin lispro (ADMELOG) corrective regimen sliding scale   SubCutaneous every 6 hours  insulin NPH human recombinant 12 Unit(s) SubCutaneous every 6 hours  nystatin Powder 1 Application(s) Topical two times a day  pantoprazole  Injectable 40 milliGRAM(s) IV Push daily  sodium chloride 0.9%. 1000 milliLiter(s) (75 mL/Hr) IV Continuous <Continuous>  sodium chloride 3%  Inhalation 3 milliLiter(s) Inhalation every 6 hours  vancomycin    Solution 125 milliGRAM(s) Oral every 6 hours    T(F): 98.8 (21 @ 03:00), Max: 98.8 (21 @ 03:00)  HR: 84 (21 @ 08:00)  BP: 117/70 (21 @ 08:00)  RR: 12 (21 @ 08:00)  SpO2: 100% (21 @ 05:18)  Wt(kg): --    PHYSICAL EXAM:  General: poorly interactive  Eyes:  anicteric, no conjunctival injection, no discharge  Oropharynx: no lesions or injection 	  Neck: supple, trach  Lungs: scattered ronchi  Heart: regular rate and rhythm; no murmur, rubs or gallops  Abdomen: soft, nondistended, nontender, without mass or organomegaly, peg  Skin: no lesions  Extremities: no clubbing, cyanosis, trace edema  Neurologic: poorly interactive    LAB RESULTS:                        7.9    10.62 )-----------( 451      ( 08 Dec 2021 00:41 )             25.1     12    144  |  106  |  66<H>  ----------------------------<  116<H>  4.2   |  25  |  1.33<H>    Ca    8.9      08 Dec 2021 00:41  Phos  5.0       Mg     2.7         TPro  6.6  /  Alb  3.0<L>  /  TBili  0.2  /  DBili  <0.1  /  AST  49<H>  /  ALT  62<H>  /  AlkPhos  122<H>      LIVER FUNCTIONS - ( 07 Dec 2021 06:20 )  Alb: 3.0 g/dL / Pro: 6.6 g/dL / ALK PHOS: 122 U/L / ALT: 62 U/L / AST: 49 U/L / GGT: x           Urinalysis Basic - ( 06 Dec 2021 18:37 )    Color: Yellow / Appearance: Slightly Turbid / S.023 / pH: x  Gluc: x / Ketone: Trace  / Bili: Negative / Urobili: Negative   Blood: x / Protein: 100 mg/dL / Nitrite: Negative   Leuk Esterase: Negative / RBC: 112 /hpf / WBC 3 /HPF   Sq Epi: x / Non Sq Epi: 2 /hpf / Bacteria: Negative      MICROBIOLOGY:  RECENT CULTURES:   @ 21:52 .CSF CSF     Testing in progress    polymorphonuclear leukocytes seen  No organisms seen  by cytocentrifuge     @ 02:54 Catheterized Catheterized     No growth       @ 22:10 .Blood Blood     No growth to date.       @ 22:08 .Blood Blood     No growth to date.          RADIOLOGY REVIEWED:    < from: CT Head No Cont (21 @ 11:42) >  INTERPRETATION:  CLINICAL INDICATION: Follow-up after ICH evacuation    5mm axial sections of the brain were obtained from base to vertex, without the intravenous administration of contrast material. Coronal and sagittal computer generated reconstructed views are available.      Comparison is made with the prior CT of 2021.    In the interval since the previous exam the ventricles have moderately enlarged and there is prominence of temporal horns. A right parietal  shunt catheter has its tip in the midline. There is been a suboccipital craniectomy and there are postoperative changes involving the left cerebellum with a small amount of high density hemorrhage is less prominent compared to the prior exam.    IMPRESSION: Left suboccipital craniectomy and cerebellar postoperative changes. Right parietal  shunt catheter. Moderate enlargement of the ventricles compared with 2021..    Dr. Ocampo discussed these findings with Dr. Galo on 2021 11:55 AM with read back.    --- End of Report ---    < end of copied text >  < from: Xray Chest 1 View- PORTABLE-Urgent (Xray Chest 1 View- PORTABLE-Urgent .) (21 @ 18:43) >  IMPRESSION:    Clear lungs.    < end of copied text >

## 2021-12-08 NOTE — PROGRESS NOTE ADULT - ASSESSMENT
62 yo male with PMH of HTN   Admitted on 11/4 with severe headache and found to have cerebellar bleed.  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  S/p trach and peg.   He has distal DVT's - on apixaban.  He received cefepime 11/4-11/21 for respiratory coverage.  Developed watery C diff diarrhea on 11/26 - started on oral vancomycin   Then restarted on cefepime on 11/28 for concerns of pneumonia since had low grade temps. Giorgio neb added later  But CXR's remained clear.  Ct of brain showed residual blood.  Pseudomonas in sputum found to be resistant to carbapenems & quinolones. Sputum isolated strep pneumoniae as well    11/28 urine and blood cultures are negative at 48 hours.  His trach secretions are moderate & have much improved.  Teixeira was removed on 12/06  Cefepime completed on 12/06 & Giorgio nebs on 12/07/21  Last night was noted to be in retention of 1000 ml of urine - teixeira catheter was placed  Spiked a fever to 102F  UA with 3 WBCs, no nit or LE, large blood   CXR with clear lungs    Possible transient fever 12/6  was in response to retention, afebrile today.  He is s/p tap of pseudomeningocele, gram stain without organisms  He is now monitored off systemic antibiotics  Suggest:  Follow clinically off of systemic antibiotics for now  Continue enteral vanco for C diff, day 13/14, stools still loose  Anticoagulation per NS(apixaban), now on hold?  Supportive care per NS and medicine  If CSF fluid  culture turns positive we will be forced to treat it.

## 2021-12-09 LAB
ANION GAP SERPL CALC-SCNC: 11 MMOL/L — SIGNIFICANT CHANGE UP (ref 5–17)
ANION GAP SERPL CALC-SCNC: 11 MMOL/L — SIGNIFICANT CHANGE UP (ref 5–17)
BUN SERPL-MCNC: 54 MG/DL — HIGH (ref 7–23)
BUN SERPL-MCNC: 60 MG/DL — HIGH (ref 7–23)
CALCIUM SERPL-MCNC: 9.1 MG/DL — SIGNIFICANT CHANGE UP (ref 8.4–10.5)
CALCIUM SERPL-MCNC: 9.3 MG/DL — SIGNIFICANT CHANGE UP (ref 8.4–10.5)
CHLORIDE SERPL-SCNC: 110 MMOL/L — HIGH (ref 96–108)
CHLORIDE SERPL-SCNC: 112 MMOL/L — HIGH (ref 96–108)
CO2 SERPL-SCNC: 23 MMOL/L — SIGNIFICANT CHANGE UP (ref 22–31)
CO2 SERPL-SCNC: 24 MMOL/L — SIGNIFICANT CHANGE UP (ref 22–31)
CREAT SERPL-MCNC: 1.05 MG/DL — SIGNIFICANT CHANGE UP (ref 0.5–1.3)
CREAT SERPL-MCNC: 1.2 MG/DL — SIGNIFICANT CHANGE UP (ref 0.5–1.3)
GLUCOSE SERPL-MCNC: 156 MG/DL — HIGH (ref 70–99)
GLUCOSE SERPL-MCNC: 159 MG/DL — HIGH (ref 70–99)
HCT VFR BLD CALC: 18.2 % — CRITICAL LOW (ref 39–50)
HCT VFR BLD CALC: 23.9 % — LOW (ref 39–50)
HCT VFR BLD CALC: 24 % — LOW (ref 39–50)
HGB BLD-MCNC: 5.6 G/DL — CRITICAL LOW (ref 13–17)
HGB BLD-MCNC: 7.3 G/DL — LOW (ref 13–17)
HGB BLD-MCNC: 7.4 G/DL — LOW (ref 13–17)
MAGNESIUM SERPL-MCNC: 2.4 MG/DL — SIGNIFICANT CHANGE UP (ref 1.6–2.6)
MAGNESIUM SERPL-MCNC: 2.6 MG/DL — SIGNIFICANT CHANGE UP (ref 1.6–2.6)
MCHC RBC-ENTMCNC: 29.5 PG — SIGNIFICANT CHANGE UP (ref 27–34)
MCHC RBC-ENTMCNC: 29.7 PG — SIGNIFICANT CHANGE UP (ref 27–34)
MCHC RBC-ENTMCNC: 29.8 PG — SIGNIFICANT CHANGE UP (ref 27–34)
MCHC RBC-ENTMCNC: 30.5 GM/DL — LOW (ref 32–36)
MCHC RBC-ENTMCNC: 30.8 GM/DL — LOW (ref 32–36)
MCHC RBC-ENTMCNC: 30.8 GM/DL — LOW (ref 32–36)
MCV RBC AUTO: 95.8 FL — SIGNIFICANT CHANGE UP (ref 80–100)
MCV RBC AUTO: 96.4 FL — SIGNIFICANT CHANGE UP (ref 80–100)
MCV RBC AUTO: 97.6 FL — SIGNIFICANT CHANGE UP (ref 80–100)
NRBC # BLD: 0 /100 WBCS — SIGNIFICANT CHANGE UP (ref 0–0)
PHOSPHATE SERPL-MCNC: 3.2 MG/DL — SIGNIFICANT CHANGE UP (ref 2.5–4.5)
PHOSPHATE SERPL-MCNC: 3.5 MG/DL — SIGNIFICANT CHANGE UP (ref 2.5–4.5)
PLATELET # BLD AUTO: 424 K/UL — HIGH (ref 150–400)
PLATELET # BLD AUTO: 434 K/UL — HIGH (ref 150–400)
PLATELET # BLD AUTO: 473 K/UL — HIGH (ref 150–400)
POTASSIUM SERPL-MCNC: 3.7 MMOL/L — SIGNIFICANT CHANGE UP (ref 3.5–5.3)
POTASSIUM SERPL-MCNC: 4 MMOL/L — SIGNIFICANT CHANGE UP (ref 3.5–5.3)
POTASSIUM SERPL-SCNC: 3.7 MMOL/L — SIGNIFICANT CHANGE UP (ref 3.5–5.3)
POTASSIUM SERPL-SCNC: 4 MMOL/L — SIGNIFICANT CHANGE UP (ref 3.5–5.3)
RBC # BLD: 1.9 M/UL — LOW (ref 4.2–5.8)
RBC # BLD: 2.45 M/UL — LOW (ref 4.2–5.8)
RBC # BLD: 2.49 M/UL — LOW (ref 4.2–5.8)
RBC # FLD: 13 % — SIGNIFICANT CHANGE UP (ref 10.3–14.5)
RBC # FLD: 13.2 % — SIGNIFICANT CHANGE UP (ref 10.3–14.5)
RBC # FLD: 13.2 % — SIGNIFICANT CHANGE UP (ref 10.3–14.5)
SODIUM SERPL-SCNC: 144 MMOL/L — SIGNIFICANT CHANGE UP (ref 135–145)
SODIUM SERPL-SCNC: 147 MMOL/L — HIGH (ref 135–145)
WBC # BLD: 6.94 K/UL — SIGNIFICANT CHANGE UP (ref 3.8–10.5)
WBC # BLD: 7 K/UL — SIGNIFICANT CHANGE UP (ref 3.8–10.5)
WBC # BLD: 8.92 K/UL — SIGNIFICANT CHANGE UP (ref 3.8–10.5)
WBC # FLD AUTO: 6.94 K/UL — SIGNIFICANT CHANGE UP (ref 3.8–10.5)
WBC # FLD AUTO: 7 K/UL — SIGNIFICANT CHANGE UP (ref 3.8–10.5)
WBC # FLD AUTO: 8.92 K/UL — SIGNIFICANT CHANGE UP (ref 3.8–10.5)

## 2021-12-09 PROCEDURE — 99233 SBSQ HOSP IP/OBS HIGH 50: CPT

## 2021-12-09 PROCEDURE — 99291 CRITICAL CARE FIRST HOUR: CPT

## 2021-12-09 PROCEDURE — 70450 CT HEAD/BRAIN W/O DYE: CPT | Mod: 26

## 2021-12-09 RX ORDER — APIXABAN 2.5 MG/1
5 TABLET, FILM COATED ORAL EVERY 12 HOURS
Refills: 0 | Status: DISCONTINUED | OUTPATIENT
Start: 2021-12-09 | End: 2021-12-09

## 2021-12-09 RX ORDER — IPRATROPIUM/ALBUTEROL SULFATE 18-103MCG
3 AEROSOL WITH ADAPTER (GRAM) INHALATION EVERY 6 HOURS
Refills: 0 | Status: DISCONTINUED | OUTPATIENT
Start: 2021-12-09 | End: 2022-01-26

## 2021-12-09 RX ORDER — VANCOMYCIN HCL 1 G
125 VIAL (EA) INTRAVENOUS EVERY 6 HOURS
Refills: 0 | Status: COMPLETED | OUTPATIENT
Start: 2021-12-09 | End: 2021-12-10

## 2021-12-09 RX ORDER — APIXABAN 2.5 MG/1
5 TABLET, FILM COATED ORAL EVERY 12 HOURS
Refills: 0 | Status: DISCONTINUED | OUTPATIENT
Start: 2021-12-09 | End: 2021-12-10

## 2021-12-09 RX ORDER — ACETYLCYSTEINE 200 MG/ML
4 VIAL (ML) MISCELLANEOUS EVERY 4 HOURS
Refills: 0 | Status: DISCONTINUED | OUTPATIENT
Start: 2021-12-09 | End: 2021-12-09

## 2021-12-09 RX ORDER — ACETYLCYSTEINE 200 MG/ML
4 VIAL (ML) MISCELLANEOUS EVERY 6 HOURS
Refills: 0 | Status: DISCONTINUED | OUTPATIENT
Start: 2021-12-09 | End: 2022-01-26

## 2021-12-09 RX ORDER — DOXAZOSIN MESYLATE 4 MG
2 TABLET ORAL DAILY
Refills: 0 | Status: DISCONTINUED | OUTPATIENT
Start: 2021-12-09 | End: 2022-01-26

## 2021-12-09 RX ADMIN — HUMAN INSULIN 12 UNIT(S): 100 INJECTION, SUSPENSION SUBCUTANEOUS at 01:04

## 2021-12-09 RX ADMIN — SODIUM CHLORIDE 3 MILLILITER(S): 9 INJECTION INTRAMUSCULAR; INTRAVENOUS; SUBCUTANEOUS at 11:54

## 2021-12-09 RX ADMIN — Medication 4 MILLILITER(S): at 11:53

## 2021-12-09 RX ADMIN — Medication 1 DROP(S): at 17:34

## 2021-12-09 RX ADMIN — PANTOPRAZOLE SODIUM 40 MILLIGRAM(S): 20 TABLET, DELAYED RELEASE ORAL at 12:00

## 2021-12-09 RX ADMIN — Medication 2: at 12:05

## 2021-12-09 RX ADMIN — CHLORHEXIDINE GLUCONATE 1 APPLICATION(S): 213 SOLUTION TOPICAL at 06:34

## 2021-12-09 RX ADMIN — Medication 2: at 17:37

## 2021-12-09 RX ADMIN — Medication 2 MILLIGRAM(S): at 12:00

## 2021-12-09 RX ADMIN — NYSTATIN CREAM 1 APPLICATION(S): 100000 CREAM TOPICAL at 17:34

## 2021-12-09 RX ADMIN — Medication 1 DROP(S): at 06:36

## 2021-12-09 RX ADMIN — Medication 2: at 06:35

## 2021-12-09 RX ADMIN — NYSTATIN CREAM 1 APPLICATION(S): 100000 CREAM TOPICAL at 06:36

## 2021-12-09 RX ADMIN — HUMAN INSULIN 12 UNIT(S): 100 INJECTION, SUSPENSION SUBCUTANEOUS at 17:38

## 2021-12-09 RX ADMIN — Medication 125 MILLIGRAM(S): at 17:33

## 2021-12-09 RX ADMIN — Medication 4 MILLILITER(S): at 18:08

## 2021-12-09 RX ADMIN — SODIUM CHLORIDE 3 MILLILITER(S): 9 INJECTION INTRAMUSCULAR; INTRAVENOUS; SUBCUTANEOUS at 18:09

## 2021-12-09 RX ADMIN — Medication 1 DROP(S): at 01:04

## 2021-12-09 RX ADMIN — Medication 1 DROP(S): at 12:00

## 2021-12-09 RX ADMIN — CHLORHEXIDINE GLUCONATE 15 MILLILITER(S): 213 SOLUTION TOPICAL at 06:34

## 2021-12-09 RX ADMIN — CHLORHEXIDINE GLUCONATE 15 MILLILITER(S): 213 SOLUTION TOPICAL at 17:33

## 2021-12-09 RX ADMIN — Medication 3 MILLILITER(S): at 11:53

## 2021-12-09 RX ADMIN — HUMAN INSULIN 12 UNIT(S): 100 INJECTION, SUSPENSION SUBCUTANEOUS at 12:05

## 2021-12-09 RX ADMIN — ATORVASTATIN CALCIUM 40 MILLIGRAM(S): 80 TABLET, FILM COATED ORAL at 22:43

## 2021-12-09 RX ADMIN — Medication 125 MILLIGRAM(S): at 01:05

## 2021-12-09 RX ADMIN — Medication 125 MILLIGRAM(S): at 06:35

## 2021-12-09 RX ADMIN — SODIUM CHLORIDE 3 MILLILITER(S): 9 INJECTION INTRAMUSCULAR; INTRAVENOUS; SUBCUTANEOUS at 05:31

## 2021-12-09 RX ADMIN — Medication 3 MILLILITER(S): at 18:07

## 2021-12-09 RX ADMIN — CHLORHEXIDINE GLUCONATE 1 APPLICATION(S): 213 SOLUTION TOPICAL at 22:43

## 2021-12-09 RX ADMIN — HUMAN INSULIN 12 UNIT(S): 100 INJECTION, SUSPENSION SUBCUTANEOUS at 06:35

## 2021-12-09 RX ADMIN — Medication 125 MILLIGRAM(S): at 12:00

## 2021-12-09 NOTE — DIETITIAN NUTRITION RISK NOTIFICATION - TREATMENT: THE FOLLOWING DIET HAS BEEN RECOMMENDED
Diet, NPO:   Tube Feeding Modality: Nasogastric  Glucerna 1.2 Blake (GLUCERNARTH)  Total Volume for 24 Hours (mL): 1440  Continuous  Starting Tube Feed Rate {mL per Hour}: 20  Increase Tube Feed Rate by (mL): 10     Every 4 hours  Until Goal Tube Feed Rate (mL per Hour): 60  Tube Feed Duration (in Hours): 24  Tube Feed Start Time: 10:00  Free Water Flush  Bolus   Total Volume per Flush (mL): 250   Frequency: Every 6 Hours (12-08-21 @ 12:11) [Active]

## 2021-12-09 NOTE — PROGRESS NOTE ADULT - SUBJECTIVE AND OBJECTIVE BOX
Chief Complaint:  Patient is a 61y old  Male who presents with a chief complaint of ICH (08 Dec 2021 08:58)      Date of service 21 @ 13:29      Interval Events:   no events      Hospital Medications:  acetaminophen    Suspension .. 650 milliGRAM(s) Oral every 6 hours PRN  artificial  tears Solution 1 Drop(s) Both EYES every 6 hours  atorvastatin 40 milliGRAM(s) Oral at bedtime  chlorhexidine 0.12% Liquid 15 milliLiter(s) Oral Mucosa two times a day  chlorhexidine 4% Liquid 1 Application(s) Topical <User Schedule>  dextrose 50% Injectable 25 Gram(s) IV Push once  dextrose 50% Injectable 12.5 Gram(s) IV Push once  insulin lispro (ADMELOG) corrective regimen sliding scale   SubCutaneous every 6 hours  insulin NPH human recombinant 12 Unit(s) SubCutaneous every 6 hours  nystatin Powder 1 Application(s) Topical two times a day  pantoprazole  Injectable 40 milliGRAM(s) IV Push daily  sodium chloride 0.9%. 1000 milliLiter(s) IV Continuous <Continuous>  sodium chloride 3%  Inhalation 3 milliLiter(s) Inhalation every 6 hours  vancomycin    Solution 125 milliGRAM(s) Oral every 6 hours        Review of Systems:  unable to obtain. trached      PHYSICAL EXAM:   Vital Signs Last 24 Hrs  T(C): 36.8 (09 Dec 2021 16:00), Max: 37.2 (09 Dec 2021 04:00)  T(F): 98.2 (09 Dec 2021 16:00), Max: 99 (09 Dec 2021 04:00)  HR: 86 (09 Dec 2021 18:00) (79 - 98)  BP: 112/66 (09 Dec 2021 18:00) (97/54 - 147/81)  BP(mean): 74 (09 Dec 2021 18:00) (64 - 102)  RR: 14 (09 Dec 2021 18:00) (11 - 18)  SpO2: 100% (09 Dec 2021 18:00) (99% - 100%)      PHYSICAL EXAM:     GENERAL:  Appears stated age, well-groomed, well-nourished, no distress  HEENT:  NC/AT,  conjunctivae anicteric, clear and pink,   NECK: supple, trachea midline  CHEST:  Full & symmetric excursion, no increased effort, breath sounds clear  HEART:  Regular rhythm, no JVD  ABDOMEN:  Soft, non-tender, non-distended, normoactive bowel sounds,  no masses , no hepatosplenomegaly, +PEG  EXTREMITIES:  no cyanosis,clubbing or edema  SKIN:  No rash, erythema, or, ecchymoses, no jaundice  NEURO:  non-focal, no asterixis  RECTAL: Deferred      LABS Personally reviewed by me:                        7.9    10.62 )-----------( 451      ( 08 Dec 2021 00:41 )             25.1     Mean Cell Volume: 95.1 fl (-21 @ 00:41)        144  |  106  |  66<H>  ----------------------------<  116<H>  4.2   |  25  |  1.33<H>    Ca    8.9      08 Dec 2021 00:41  Phos  5.0       Mg     2.7         TPro  6.6  /  Alb  3.0<L>  /  TBili  0.2  /  DBili  <0.1  /  AST  49<H>  /  ALT  62<H>  /  AlkPhos  122<H>      LIVER FUNCTIONS - ( 07 Dec 2021 06:20 )  Alb: 3.0 g/dL / Pro: 6.6 g/dL / ALK PHOS: 122 U/L / ALT: 62 U/L / AST: 49 U/L / GGT: x           PT/INR - ( 07 Dec 2021 15:29 )   PT: 16.2 sec;   INR: 1.37 ratio         PTT - ( 07 Dec 2021 15:29 )  PTT:29.8 sec  Urinalysis Basic - ( 06 Dec 2021 18:37 )    Color: Yellow / Appearance: Slightly Turbid / S.023 / pH: x  Gluc: x / Ketone: Trace  / Bili: Negative / Urobili: Negative   Blood: x / Protein: 100 mg/dL / Nitrite: Negative   Leuk Esterase: Negative / RBC: 112 /hpf / WBC 3 /HPF   Sq Epi: x / Non Sq Epi: 2 /hpf / Bacteria: Negative                              7.9    10.62 )-----------( 451      ( 08 Dec 2021 00:41 )             25.1                         8.6    10.96 )-----------( 446      ( 07 Dec 2021 06:20 )             27.5                         9.3    10.73 )-----------( 450      ( 05 Dec 2021 17:42 )             29.6       Imaging personally reviewed by me:

## 2021-12-09 NOTE — PROGRESS NOTE ADULT - ASSESSMENT
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 s/p EVD clamp & removal. NSCU course also c/b respiratory failure and dysphagia now s/p trach and PEG on 11/19. S/p Certas@4 VPS placement on 11/23. Completed course of cefepime for enterobacter and pseudomonas pneumonia on 11/21. Transferred out of NSCU on 11/25. Began to spike fevers with watery output from rectal tube found to be positive for c. diff, PO vanco started on 11/26.  to finish in 2 days.   Persistent fever, increased secretions, sputum colonized by Pseudomonas, Cefepime restarted on 11/28 and stopped 12/6.   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 Moved to ICU w plan to do  shunt revision tomorrow      NEURO:  No OR plan today, NSGY wants to wait for eliquis to be washed out, keep holding  [id number]  CTH tomorrow  FU CSF studies  VPS certas@4 shunt management per NSGY      PULM: trach 6 josi uncuffed to collar, now has cuffed trach  cont suctions  cont trach collar as tolerated    CV: lipitor  Keep -160mmHg  labetalol off    RENAL: MICHELLE likely  from retaining, monitor IOs teixeira  Net negative would give bolus, NS @ 75  cont free water bolus 250 q6    GI: on PPI per GI  see below for c diff plan  PEG feeds resume  Bowel regimen standing    ENDO: Cont insulin   cont glucose checks  Goal euglycemia (-180)    HEME/ONC:  VTE prophylaxis: [x] SCDs hold eliquis for OR    ID:  C diff colitis, on oral vanco and having fevers continue treatment  ID following  Monitor off abx, FU CSF cultures if positive start empiric abx      MISC:    SOCIAL/FAMILY:  [x] awaiting [] updated at bedside [] family meeting    CODE STATUS:  [] Full Code [x] DNR [] DNI [] Palliative/Comfort Care    DISPOSITION:  [x] ICU [] Stroke Unit [] Floor [] EMU [] RCU [] PCU    [x] Patient is at high risk of neurologic deterioration/death due to: hydrocephalus, brain compression      Contact: [phone]   ASSESSMENT: 62 yo admitted to the NSCU for VPS malfunction and pseudomeningocele     NEURO:  CT head today   checks q 1 hr  NS plan pending   VPS certas@4 shunt management per NSGY      PULM: trach 6 josi uncuffed to collar, now has cuffed trach  cont suctions, suction q 1 hr   thick secretions mucomyst and duonebs     CV: lipitor   Keep -160 mmHg    RENAL: MICHELLE creatinine improved    ml q 6 hr   teixeira due to urinary retention   cardura   d/c teixeira  bladder scan q 6 hr and straight cath     GI:   PEG feeds at feed   Bowel regimen standing  C.diff   no diarrhea     ENDO:   NPH 12 units q 6 hr  sugar well controlled   cont glucose checks  Goal euglycemia (-180)    HEME/ONC:  VTE prophylaxis: [x] SCDs hold eliquis for OR, possible OR, will talk to NS if no surgery will consider heparin 5000 units   DVT below know, vascular cardiology on consult, repeat dopplers     ID:  C diff colitis, no diarrhea since 7, d/c isolation per ID  today last day of oral vancomycin         MISC: consult RCU     SOCIAL/FAMILY:  [x] awaiting [] updated at bedside [] family meeting    CODE STATUS:  [] Full Code [x] DNR [] DNI [] Palliative/Comfort Care    DISPOSITION:  [x] ICU [] Stroke Unit [] Floor [] EMU [] RCU [] PCU    [x] Patient is at high risk of neurologic deterioration/death due to: hydrocephalus, brain compression    Contact: [phone] 35 critical care time

## 2021-12-09 NOTE — PROGRESS NOTE ADULT - ATTENDING COMMENTS
Surveillance duplex in 1 week  If no OR, then resume Eliquis when ok with Nsx  If OR, then DVT prophylaxis when ok with Nsx      [facility] [phone]

## 2021-12-09 NOTE — PROGRESS NOTE ADULT - ASSESSMENT
60 yo male with PMH of HTN   Admitted on 11/4 with severe headache and found to have cerebellar bleed.  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  S/p trach and peg.   He has distal DVT's - on apixaban.  He received cefepime 11/4-11/21 for respiratory coverage.  Developed watery C diff diarrhea on 11/26 - started on oral vancomycin   Then restarted on cefepime on 11/28 for concerns of pneumonia since had low grade temps. Giorgio neb added later  But CXR's remained clear.  Ct of brain showed residual blood.  Pseudomonas in sputum found to be resistant to carbapenems & quinolones. Sputum isolated strep pneumoniae as well    11/28 urine and blood cultures are negative at 48 hours.  His trach secretions are moderate & have much improved.  Cefepime completed on 12/06 & Giorgio nebs on 12/07/21  Failed TOV & teixeira was replaced for retention of 1000 ml of urine on 12/06/21 PM  Spiked a fever to 102F ? in response to retention  UA with 3 WBCs, no nit or LE, large blood   CXR with clear lungs  CTH revealed a Pseudomeningocele, s/p tap with gram stain without organisms, cx NGTD    Suggest:  Follow clinically off of systemic antibiotics for now  If CSF fluid culture turns positive we will be forced to treat it.  Continue enteral vanco for C diff, day 14/14  Supportive care per NS and medicine

## 2021-12-09 NOTE — PROGRESS NOTE ADULT - SUBJECTIVE AND OBJECTIVE BOX
CC: f/u for C Diff and post op infections    Patient reports nothing     REVIEW OF SYSTEMS:  Unobtainable - non responsive     Antimicrobials Day #  : 14/14  vancomycin    Solution 125 milliGRAM(s) Oral every 6 hours    Other Medications Reviewed    T(F): 98 (12-09-21 @ 08:00), Max: 99 (12-09-21 @ 04:00)  HR: 87 (12-09-21 @ 11:00)  BP: 135/79 (12-09-21 @ 11:00)  RR: 15 (12-09-21 @ 11:00)  SpO2: 100% (12-09-21 @ 11:00)  Wt(kg): --    PHYSICAL EXAM:  General: poorly interactive  Eyes:  anicteric, no conjunctival injection, no discharge  Neck: supple, trach  Lungs: scattered rhonchi  Heart: regular rate and rhythm; no murmurs  Abdomen: soft, nondistended, nontender, peg +  Skin: no lesions  Extremities: no clubbing, cyanosis, trace edema  Neurologic: non interactive    LAB RESULTS:                        7.3    7.00  )-----------( 424      ( 09 Dec 2021 01:59 )             23.9     12-09    144  |  110<H>  |  60<H>  ----------------------------<  159<H>  4.0   |  23  |  1.20    Ca    9.1      09 Dec 2021 00:49  Phos  3.5     12-09  Mg     2.6     12-09          MICROBIOLOGY:  RECENT CULTURES:  12-07 @ 21:52 .CSF CSF     No growth    polymorphonuclear leukocytes seen  No organisms seen  by cytocentrifuge    12-07 @ 02:54 Catheterized      No growth      12-06 @ 22:10 .Blood Blood     No growth to date.      12-06 @ 22:08 .Blood Blood     No growth to date.        RADIOLOGY REVIEWED:

## 2021-12-09 NOTE — PROGRESS NOTE ADULT - ASSESSMENT
61 year old man with respiratory failure d/t ICH    1. ICH  -per neurosurgery,  -s/p  shunt    2. Respiratory failure  -for tracheostomy, will require long term enteral nutrition  -s/p PEG, tolerating feeds  - aspiration precautions     3. Enterobacter PNA  -s/p course of antibiotics     4. afib with RVR    5. Anemia, no overt GI bleed. EGD unremarkable. Low hgb today lab error, stable on rpt  -trend CBC    6. Cdiff infection on PO vanco  -s/p abx course  -diarrhea resolved    7. DVT on a/c  -continue PPI       [facility], [place]  Gastroenterology and Hepatology  [address], [place]  Office: [phone]  Cell: [phone]

## 2021-12-09 NOTE — PROGRESS NOTE ADULT - SUBJECTIVE AND OBJECTIVE BOX
NSCU EVENING NOTE -- ADDITIONAL PROGRESS NOTE    Nighttime rounds were performed -- please refer to earlier Progress Note for HPI details.    T(C): 36.8 (12-09-21 @ 16:00), Max: 37.2 (12-09-21 @ 04:00)  HR: 85 (12-09-21 @ 18:47) (79 - 98)  BP: 112/66 (12-09-21 @ 18:00) (97/54 - 147/81)  RR: 14 (12-09-21 @ 18:00) (11 - 18)  SpO2: 100% (12-09-21 @ 18:47) (99% - 100%)  Wt(kg): --    Relevant labwork and imaging reviewed.    Patient remains critically ill.    [A/P]    Imaging with increased Pseudomeningocele with possible shunt failure s/p bedside tap; plan per NSG for shunt revision, if not plan for repeat imaging and possible floor txer if no intervention.  CTH stable today  ABD XR with colonic distention, on PE no abd distention, no diarrhea. on ABX for C. diff colitis    Additional 30 minutes of critical care time. NSCU EVENING NOTE -- ADDITIONAL PROGRESS NOTE    Nighttime rounds were performed -- please refer to earlier Progress Note for HPI details.    T(C): 36.8 (12-09-21 @ 16:00), Max: 37.2 (12-09-21 @ 04:00)  HR: 85 (12-09-21 @ 18:47) (79 - 98)  BP: 112/66 (12-09-21 @ 18:00) (97/54 - 147/81)  RR: 14 (12-09-21 @ 18:00) (11 - 18)  SpO2: 100% (12-09-21 @ 18:47) (99% - 100%)  Wt(kg): --    Relevant labwork and imaging reviewed.    Patient remains critically ill.    Imaging with increased Pseudomeningocele with possible shunt failure s/p bedside tap; plan per NSG for shunt revision, if not plan for repeat imaging and possible floor txer if no intervention.  CTH stable today  ABD XR with colonic distention, on PE no abd distention, no diarrhea. on ABX for C. diff colitis

## 2021-12-09 NOTE — PROGRESS NOTE ADULT - SUBJECTIVE AND OBJECTIVE BOX
SUBJECTIVE: HPI:  61M hx HTN, DM, cardiac stent on ASA. At work complaining of HA ~8:30, starting feeling dizzy and vomiting, HTN/keke when EMS got to him. SBP 220s, HR 50s.     On EMS arrival at 09:39AM 11/4/21, patient seen talking a little, garbling, moving all extremities, then quickly became unresponsive. No known blood thinners.    CTH shows large posterior fossa bleed w/ IVH/SAH/hydro.    Exam:  Pre/intubation exam: no eyes open, pupils 2b/l, + corneals/cough/gag, not FC, LUE spont fingers moving, flacid otherwise    ICU Vital Signs Last 24 Hrs  T(C): 34.8 (04 Nov 2021 12:00), Max: 34.8 (04 Nov 2021 12:00)  T(F): 94.6 (04 Nov 2021 12:00), Max: 94.6 (04 Nov 2021 12:00)  HR: 127 (04 Nov 2021 11:30) (109 - 146)  BP: 123/78 (04 Nov 2021 11:30) (123/78 - 245/142)  BP(mean): --  ABP: --  ABP(mean): --  RR: 19 (04 Nov 2021 11:30) (19 - 30)  SpO2: 100% (04 Nov 2021 11:30) (100% - 100%)  11-04    140  |  103  |  18  ----------------------------<  226<H>  3.3<L>   |  19<L>  |  1.24    Ca    9.5      04 Nov 2021 10:16    TPro  7.8  /  Alb  4.8  /  TBili  0.4  /  DBili  x   /  AST  18  /  ALT  14  /  AlkPhos  94  11-04                        12.9   15.29 )-----------( 322      ( 04 Nov 2021 10:16 )             40.5    (04 Nov 2021 12:28)      OVERNIGHT EVENTS:     Vital Signs Last 24 Hrs  T(C): 36.7 (09 Dec 2021 08:00), Max: 37.2 (09 Dec 2021 04:00)  T(F): 98 (09 Dec 2021 08:00), Max: 99 (09 Dec 2021 04:00)  HR: 79 (09 Dec 2021 08:00) (79 - 108)  BP: 134/78 (09 Dec 2021 08:00) (103/62 - 152/74)  BP(mean): 90 (09 Dec 2021 08:00) (69 - 97)  RR: 14 (09 Dec 2021 08:00) (10 - 18)  SpO2: 100% (09 Dec 2021 08:00) (99% - 100%)    PHYSICAL EXAM:    General: No Acute Distress     Neurological: Awake, alert oriented to person, place and time, Following Commands, PERRL, EOMI, Face Symmetrical, Speech Fluent, Moving all extremities, Muscle Strength normal in all four extremities, No Drift, Sensation to Light Touch Intact    Pulmonary: Clear to Auscultation, No Rales, No Rhonchi, No Wheezes     Cardiovascular: S1, S2, Regular Rate and Rhythm     Gastrointestinal: Soft, Nontender, Nondistended     Incision:     LABS:                        7.3    7.00  )-----------( 424      ( 09 Dec 2021 01:59 )             23.9    12-09    144  |  110<H>  |  60<H>  ----------------------------<  159<H>  4.0   |  23  |  1.20    Ca    9.1      09 Dec 2021 00:49  Phos  3.5     12-09  Mg     2.6     12-09    PT/INR - ( 07 Dec 2021 15:29 )   PT: 16.2 sec;   INR: 1.37 ratio         PTT - ( 07 Dec 2021 15:29 )  PTT:29.8 sec      12-08 @ 07:01  -  12-09 @ 07:00  --------------------------------------------------------  IN: 2440 mL / OUT: 1795 mL / NET: 645 mL    12-09 @ 07:01  -  12-09 @ 09:51  --------------------------------------------------------  IN: 50 mL / OUT: 150 mL / NET: -100 mL      DRAINS:     MEDICATIONS:  Antibiotics:  vancomycin    Solution 125 milliGRAM(s) Oral every 6 hours    Neuro:  acetaminophen    Suspension .. 650 milliGRAM(s) Oral every 6 hours PRN Temp greater or equal to 38C (100.4F), Mild Pain (1 - 3)    Cardiac:    Pulm:  sodium chloride 3%  Inhalation 3 milliLiter(s) Inhalation every 6 hours    GI/:  pantoprazole  Injectable 40 milliGRAM(s) IV Push daily    Other:   artificial  tears Solution 1 Drop(s) Both EYES every 6 hours  atorvastatin 40 milliGRAM(s) Oral at bedtime  chlorhexidine 0.12% Liquid 15 milliLiter(s) Oral Mucosa two times a day  chlorhexidine 4% Liquid 1 Application(s) Topical <User Schedule>  dextrose 50% Injectable 25 Gram(s) IV Push once  dextrose 50% Injectable 12.5 Gram(s) IV Push once  insulin lispro (ADMELOG) corrective regimen sliding scale   SubCutaneous every 6 hours  insulin NPH human recombinant 12 Unit(s) SubCutaneous every 6 hours  nystatin Powder 1 Application(s) Topical two times a day  sodium chloride 0.9%. 1000 milliLiter(s) IV Continuous <Continuous>    DIET: [] Regular [] CCD [] Renal [] Puree [] Dysphagia [] Tube Feeds:     IMAGING:    SUBJECTIVE: HPI:  61M hx HTN, DM, cardiac stent on ASA. At work complaining of HA ~8:30, starting feeling dizzy and vomiting, HTN/keke when EMS got to him. SBP 220s, HR 50s.     On EMS arrival at 09:39AM 11/4/21, patient seen talking a little, garbling, moving all extremities, then quickly became unresponsive. No known blood thinners.    CTH shows large posterior fossa bleed w/ IVH/SAH/hydro.    O:   drop in hgb     T(C): 36.7 (12-09-21 @ 08:00), Max: 37.2 (12-09-21 @ 04:00)  HR: 79 (12-09-21 @ 08:00) (79 - 108)  BP: 134/78 (12-09-21 @ 08:00) (103/62 - 147/81)  RR: 14 (12-09-21 @ 08:00) (10 - 18)  SpO2: 100% (12-09-21 @ 08:00) (99% - 100%)  12-08-21 @ 07:01  -  12-09-21 @ 07:00  --------------------------------------------------------  IN: 2440 mL / OUT: 1795 mL / NET: 645 mL    12-09-21 @ 07:01  -  12-09-21 @ 10:00  --------------------------------------------------------  IN: 50 mL / OUT: 150 mL / NET: -100 mL    acetaminophen    Suspension .. 650 milliGRAM(s) Oral every 6 hours PRN  artificial  tears Solution 1 Drop(s) Both EYES every 6 hours  atorvastatin 40 milliGRAM(s) Oral at bedtime  chlorhexidine 0.12% Liquid 15 milliLiter(s) Oral Mucosa two times a day  chlorhexidine 4% Liquid 1 Application(s) Topical <User Schedule>  dextrose 50% Injectable 25 Gram(s) IV Push once  dextrose 50% Injectable 12.5 Gram(s) IV Push once  insulin lispro (ADMELOG) corrective regimen sliding scale   SubCutaneous every 6 hours  insulin NPH human recombinant 12 Unit(s) SubCutaneous every 6 hours  nystatin Powder 1 Application(s) Topical two times a day  pantoprazole  Injectable 40 milliGRAM(s) IV Push daily  sodium chloride 0.9%. 1000 milliLiter(s) IV Continuous <Continuous>  sodium chloride 3%  Inhalation 3 milliLiter(s) Inhalation every 6 hours  vancomycin    Solution 125 milliGRAM(s) Oral every 6 hours    PHYSICAL EXAM:    General: No Acute Distress     Neurological: Awake, alert oriented to person, place and time, Following Commands, PERRL, EOMI, Face Symmetrical, Speech Fluent, Moving all extremities, Muscle Strength normal in all four extremities, No Drift, Sensation to Light Touch Intact    Pulmonary: Clear to Auscultation, No Rales, No Rhonchi, No Wheezes     Cardiovascular: S1, S2, Regular Rate and Rhythm     Gastrointestinal: Soft, Nontender, Nondistended     Incision:       LABS:  Na: 144 (12-09 @ 00:49), 144 (12-08 @ 00:41), 138 (12-07 @ 06:20)  K: 4.0 (12-09 @ 00:49), 4.2 (12-08 @ 00:41), 4.3 (12-07 @ 06:20)  Cl: 110 (12-09 @ 00:49), 106 (12-08 @ 00:41), 101 (12-07 @ 06:20)  CO2: 23 (12-09 @ 00:49), 25 (12-08 @ 00:41), 24 (12-07 @ 06:20)  BUN: 60 (12-09 @ 00:49), 66 (12-08 @ 00:41), 67 (12-07 @ 06:20)  Cr: 1.20 (12-09 @ 00:49), 1.33 (12-08 @ 00:41), 1.43 (12-07 @ 06:20)  Glu: 159(12-09 @ 00:49), 116(12-08 @ 00:41), 191(12-07 @ 06:20)    Hgb: 7.3 (12-09 @ 01:59), 5.6 (12-09 @ 00:49), 7.9 (12-08 @ 00:41), 8.6 (12-07 @ 06:20)  Hct: 23.9 (12-09 @ 01:59), 18.2 (12-09 @ 00:49), 25.1 (12-08 @ 00:41), 27.5 (12-07 @ 06:20)  WBC: 7.00 (12-09 @ 01:59), 8.92 (12-09 @ 00:49), 10.62 (12-08 @ 00:41), 10.96 (12-07 @ 06:20)  Plt: 424 (12-09 @ 01:59), 473 (12-09 @ 00:49), 451 (12-08 @ 00:41), 446 (12-07 @ 06:20)    INR: 1.37 12-07-21 @ 15:29  PTT: 29.8 12-07-21 @ 15:29           SUBJECTIVE: HPI:  61M hx HTN, DM, cardiac stent on ASA. At work complaining of HA ~8:30, starting feeling dizzy and vomiting, HTN/keke when EMS got to him. SBP 220s, HR 50s.     On EMS arrival at 09:39AM 11/4/21, patient seen talking a little, garbling, moving all extremities, then quickly became unresponsive. No known blood thinners.    CTH shows large posterior fossa bleed w/ IVH/SAH/hydro.    O:   drop in hgb     T(C): 36.7 (12-09-21 @ 08:00), Max: 37.2 (12-09-21 @ 04:00)  HR: 79 (12-09-21 @ 08:00) (79 - 108)  BP: 134/78 (12-09-21 @ 08:00) (103/62 - 147/81)  RR: 14 (12-09-21 @ 08:00) (10 - 18)  SpO2: 100% (12-09-21 @ 08:00) (99% - 100%)  12-08-21 @ 07:01  -  12-09-21 @ 07:00  --------------------------------------------------------  IN: 2440 mL / OUT: 1795 mL / NET: 645 mL    12-09-21 @ 07:01  -  12-09-21 @ 10:00  --------------------------------------------------------  IN: 50 mL / OUT: 150 mL / NET: -100 mL    acetaminophen    Suspension .. 650 milliGRAM(s) Oral every 6 hours PRN  artificial  tears Solution 1 Drop(s) Both EYES every 6 hours  atorvastatin 40 milliGRAM(s) Oral at bedtime  chlorhexidine 0.12% Liquid 15 milliLiter(s) Oral Mucosa two times a day  chlorhexidine 4% Liquid 1 Application(s) Topical <User Schedule>  dextrose 50% Injectable 25 Gram(s) IV Push once  dextrose 50% Injectable 12.5 Gram(s) IV Push once  insulin lispro (ADMELOG) corrective regimen sliding scale   SubCutaneous every 6 hours  insulin NPH human recombinant 12 Unit(s) SubCutaneous every 6 hours  nystatin Powder 1 Application(s) Topical two times a day  pantoprazole  Injectable 40 milliGRAM(s) IV Push daily  sodium chloride 0.9%. 1000 milliLiter(s) IV Continuous <Continuous>  sodium chloride 3%  Inhalation 3 milliLiter(s) Inhalation every 6 hours  vancomycin    Solution 125 milliGRAM(s) Oral every 6 hours    PHYSICAL EXAM:    General: No Acute Distress     Neurological: EO to nox, downward gaze, does not track, blinks to threat, L pupil sluggish 3 mm, R NR, +corneals, Following Commands ( squeezed R hand, wiggles L toes), trace movement BUE, TF BLE to nox    Pulmonary: Clear to Auscultation, No Rales, No Rhonchi, No Wheezes     Cardiovascular: S1, S2, Regular Rate and Rhythm     Gastrointestinal: Soft, Nontender, Nondistended     Incision:       LABS:  Na: 144 (12-09 @ 00:49), 144 (12-08 @ 00:41), 138 (12-07 @ 06:20)  K: 4.0 (12-09 @ 00:49), 4.2 (12-08 @ 00:41), 4.3 (12-07 @ 06:20)  Cl: 110 (12-09 @ 00:49), 106 (12-08 @ 00:41), 101 (12-07 @ 06:20)  CO2: 23 (12-09 @ 00:49), 25 (12-08 @ 00:41), 24 (12-07 @ 06:20)  BUN: 60 (12-09 @ 00:49), 66 (12-08 @ 00:41), 67 (12-07 @ 06:20)  Cr: 1.20 (12-09 @ 00:49), 1.33 (12-08 @ 00:41), 1.43 (12-07 @ 06:20)  Glu: 159(12-09 @ 00:49), 116(12-08 @ 00:41), 191(12-07 @ 06:20)    Hgb: 7.3 (12-09 @ 01:59), 5.6 (12-09 @ 00:49), 7.9 (12-08 @ 00:41), 8.6 (12-07 @ 06:20)  Hct: 23.9 (12-09 @ 01:59), 18.2 (12-09 @ 00:49), 25.1 (12-08 @ 00:41), 27.5 (12-07 @ 06:20)  WBC: 7.00 (12-09 @ 01:59), 8.92 (12-09 @ 00:49), 10.62 (12-08 @ 00:41), 10.96 (12-07 @ 06:20)  Plt: 424 (12-09 @ 01:59), 473 (12-09 @ 00:49), 451 (12-08 @ 00:41), 446 (12-07 @ 06:20)    INR: 1.37 12-07-21 @ 15:29  PTT: 29.8 12-07-21 @ 15:29

## 2021-12-09 NOTE — PROGRESS NOTE ADULT - CRITICAL CARE SERVICES PROVIDED
Critical care services provided

## 2021-12-09 NOTE — PROGRESS NOTE ADULT - SUBJECTIVE AND OBJECTIVE BOX
Vascular Cardiology  Progress note  EMAIL [email or web address]     OFFICE [phone]    INTERVAL HISTORY:  -NAEON.    Allergies  penicillin (Other)    MEDICATIONS:  apixaban 5 milliGRAM(s) Oral every 12 hours  doxazosin 2 milliGRAM(s) Oral daily  vancomcin    Solution 125 milliGRAM(s) Oral every 6 hours  acetylcysteine 20%  Inhalation 4 milliLiter(s) Inhalation every 4 hours  albuterol/ipratropium for Nebulization 3 milliLiter(s) Nebulizer every 6 hours  sodim chloride 3%  Inhalation 3 milliLiter(s) Inhalation every 6 hours  acetaminophen    Suspension .. 650 milliGRAM(s) Oral every 6 hours PRN  pantoprazole  Injectable 40 milliGRAM(s) IV Push daily  atorvastatin 40 milliGRAM(s) Oral at bedtime  dextrose 50% Injectable 25 Gram(s) IV Push once  dextrose 50% Injectable 12.5 Gram(s) IV Push once  insulin lispro (ADMELOG) corrective regimen sliding scale   SubCutaneous every 6 hours  insulin NPH human recombinant 12 Unit(s) SubCutaneous every 6 hours  artificial  tears Solution 1 Drop(s) Both EYES every 6 hours  chlorhexidine 0.12% Liquid 15 milliLiter(s) Oral Mucosa two times a day  chlorhexidine 4% Liquid 1 Application(s) Topical <User Schedule>  nystatin Powder 1 Application(s) Topical two times a day    PAST MEDICAL & SURGICAL HISTORY:  HTN (hypertension)  Diabetes mellitus    FAMILY HISTORY:    SOCIAL HISTORY:  unchanged    REVIEW OF SYSTEMS:  -Limited by patient factors.  [x ] Unable to obtain    PHYSICAL EXAM:  T(C): 36.9 (12-09-21 @ 12:00), Max: 37.2 (12-09-21 @ 04:00)  HR: 87 (12-09-21 @ 13:00) (79 - 98)  BP: 112/60 (12-09-21 @ 13:00) (103/62 - 147/81)  RR: 14 (12-09-21 @ 13:00) (10 - 18)  SpO2: 100% (12-09-21 @ 13:00) (99% - 100%)  Wt(kg): --  I&O's Summary    08 Dec 2021 07:01  -  09 Dec 2021 07:00  --------------------------------------------------------  IN: 2440 mL / OUT: 1795 mL / NET: 645 mL    09 Dec 2021 07:01  -  09 Dec 2021 14:04  --------------------------------------------------------  IN: 200 mL / OUT: 425 mL / NET: -225 mL    Appearance: Frail appearing, nonverbal, with trach and PEG  HEENT:   Normal oral mucosa, PERRL, EOMI	  Lymphatic: No lymphadenopathy  Cardiovascular:  S1, S2, RRR, no RMG  Respiratory:  CTA b/l  Psychiatry:  Nonverbal  Gastrointestinal:  Soft, Non-tender, + BS	  Skin: No rashes, No ecchymoses, No cyanosis	  Extremities:  B/l LE with no edema, asymmetry of girth, skin changes; extremities are warm with no ulcers or wounds, no phlegmasia    Vascular Pulse Exam:  Right DP: [x]palpable []non-palpable []audible      Left DP :   [x]palpable []non-palpable []audible  Right PT: [x]palpable [] non-palpable []audible   Left PT:  [x] palpable [] non-palpable []audible      LABS:	 	    CBC Full  -  ( 09 Dec 2021 01:59 )  WBC Count : 7.00 K/uL  Hemoglobin : 7.3 g/dL  Hematocrit : 23.9 %  Platelet Count - Automated : 424 K/uL  Mean Cell Volume : 97.6 fl  Mean Cell Hemoglobin : 29.8 pg  Mean Cell Hemoglobin Concentration : 30.5 gm/dL  Auto Neutrophil # : x  Auto Lymphocyte # : x  Auto Monocyte # : x  Auto Eosinophil # : x  Auto Basophil # : x  Auto Neutrophil % : x  Auto Lymphocyte % : x  Auto Monocyte % : x  Auto Eosinophil % : x  Auto Basophil % : x    12-09    144  |  110<H>  |  60<H>  ----------------------------<  159<H>  4.0   |  23  |  1.20  12-08    144  |  106  |  66<H>  ----------------------------<  116<H>  4.2   |  25  |  1.33<H>    Ca    9.1      09 Dec 2021 00:49  Ca    8.9      08 Dec 2021 00:41  Phos  3.5     12-09  Phos  5.0     12-08  Mg     2.6     12-09  Mg     2.7     12-08    Assessment:  1. 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 EVD clamped & removed.  2. C. difficile diarrhea  --PCR positive. likely 2/2 to Abx use  3. Gram-negative pneumonia.   --s/p 7 day course of cefepime on 11/21; was started on levofloxacin for 11/24 sputum cx with pseudomonas  4. Respiratory failure.   --s/p trach. on trach collar  5. B/l below knee DVTs  --Right soleal vein DVT and persistent left posterior tibial, peroneal, gastrocnemius and soleal vein DVT without proximal propagation on last duplex on 11/24    8. CAD   --s/p stent and on DAPT  --ASA to be resumed when cleared by NSG; on statin    Plan:  1. Eliquis was discontinued in light of possible OR for VPS revision. No clear timing as of now for OR; therefore, it is okay to use DVT PPx with Heparin SQ if cleared by NSG for the time being.  2. If there are no imminent plans for OR, recommend resume Eliquis 5mg PO BID again.  3. Otherwise, continue excellent NSICU care.    Thank you,    Please call with any questions:   Service Line: [phone]  Office [phone]  email:  [email or web address]

## 2021-12-09 NOTE — CHART NOTE - NSCHARTNOTEFT_GEN_A_CORE
Nutrition Follow Up Note  Patient seen for: Nutrition follow up    Chart reviewed, events noted. Pt is a 62 yo M with PMH: HTN, CAD s/p stent on DAPT, T2DM. C/O headache and subsequently became unresponsive. CT head with posterior fossa hemorrhage c/b severe IVH with casting of the 4th and 3rd ventricles with hydrocephalus, s/p EVD and SOC. Now s/p PICA aneurysm resection; s/p EVD clamp and removal. Hospital course further c/b respiratory failure and dysphagia now s/p trach and PEG (11/19). S/P VPS placement 11/23. Completed antibiotics course for enterobacter and pseudomonas pneumonia on 11/21. Transferred out of NSCU on 11/25. On 11/26, pt found to be +C. diff and started on antibiotics. 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 Plan to do  shunt revision.     Source: [] Patient       [x] EMR        [x] RN        [] Family at bedside       [x] Other: interdisciplinary medical team    -If unable to interview patient: [x] Trach/Vent/BiPAP  [x] Disoriented/confused/inappropriate to interview    Diet Order:   Diet, NPO:   Tube Feeding Modality: Nasogastric  Glucerna 1.2 Blake (GLUCERNARTH)  Total Volume for 24 Hours (mL): 1440  Continuous  Starting Tube Feed Rate {mL per Hour}: 20  Increase Tube Feed Rate by (mL): 10     Every 4 hours  Until Goal Tube Feed Rate (mL per Hour): 60  Tube Feed Duration (in Hours): 24  Tube Feed Start Time: 10:00  Free Water Flush  Bolus   Total Volume per Flush (mL): 250   Frequency: Every 6 Hours (12-08-21)    EN Order Provides: 1440ml, 1728kcal and 86g protein     EN Provision (per nursing flow sheet):   (12/9): 330ml (thus far; infusing at 50ml/hr at time of RD visit)  (12/8): 250ml (17.4% of goal)  (12/7): 640ml (33% of goal) - feeds prescribed Glucerna 1.2 at 80ml/hr x 24 hrs  (12/6): 960ml (50% of goal) - feeds prescribed Glucerna 1.2 at 80ml/hr x 24 hrs  (12/5): 400ml (21% of goal) - feeds prescribed Glucerna 1.2 at 80ml/hr x 24 hrs    Is current diet order appropriate/adequate? [] Yes  [x]  No    Nutrition-related concerns:  -EN Feeds providing below estimated energy needs at current prescription. Pt has been receiving </=50% of estimated energy needs x >/=5 days.  -Pt continues on antibiotics course 2/2 C. diff. Stop date 12/10. Consider addition of Leon Active to aid in gut bindu.   -Pt continues to receive NPH and ISS coverage to aid in BG control.   -Last BM: (12/4): x 1; (12/2): x 1. Off apparent bowel regimen at this time. Rectal tube removed.   -Free water as per discretion of medical team    Weights:   Daily     MEDICATIONS  (STANDING):  atorvastatin  dextrose 50% Injectable  dextrose 50% Injectable  insulin lispro (ADMELOG) corrective regimen sliding scale  insulin NPH human recombinant  pantoprazole  Injectable  sodium chloride 0.9%.  vancomycin    Solution    Pertinent Labs: 12-09 @ 00:49: Na 144, BUN 60<H>, Cr 1.20, <H>, K+ 4.0, Phos 3.5, Mg 2.6, Alk Phos --, ALT/SGPT --, AST/SGOT --, HbA1c --    A1C with Estimated Average Glucose Result: 6.5 % (11-04-21 @ 21:43)    Finger Sticks:  POCT Blood Glucose.: 156 mg/dL (12-09 @ 06:32)  POCT Blood Glucose.: 134 mg/dL (12-08 @ 17:21)  POCT Blood Glucose.: 121 mg/dL (12-08 @ 11:10)    Skin per nursing documentation: surgical incision abdominal LAP, surgical incision s/p crani, sacrum stage III  Edema: 1+ generalized    Energy needs:  25- 30 kcal/kg (4669-8143 kcal/kg)  Protein needs:  1.2- 1.4 g/kg (99.6-116.2 g/kg)  Defer fluid needs to medical team  Based on upper .6 pounds (83 kg)    Previous Nutrition Diagnosis: increased nutrient needs  Nutrition Diagnosis is: [x] ongoing  [] resolved [] not applicable     New Nutrition Diagnosis: acute moderate protein calorie malnutrition  Related To: inadequate provision of energy and protein in context of complex clinical course  As Evidenced By: pt with </=75% of est. energy needs x >/=7 days (+</=50% x 5 days); 1+ generalized edema    Nutrition Care Plan:  [x] In Progress  [] Achieved  [] Not applicable       Recommendations:      1. As  tolerated, recommend increase EN feeds to Glucerna 1.2 at 75ml/hr x 24 hrs. To provide (Based on upper IBW 83kg): 1800ml, 2160kcal (26kcal/kg) and 108g protein (1.3g protein/kg).   2. Monitor GI tolerance. RD to remain available to adjust EN formulary, volume/rate PRN.   3. Monitor wt trends/labs/skin integrity/hydration status/bowel regularity.   4. Recommend leon active 2x daily 2/2 antibiotics use.   5. Recommend multivitamin (if no medication contraindications) to aid in prevention of micronutrient deficiencies.   6. Malnutrition sticker placed.    Monitoring and Evaluation:   Continue to monitor nutritional intake, tolerance to diet prescription, weights, labs, skin integrity    RD remains available upon request and will follow up per protocol

## 2021-12-10 LAB
ANION GAP SERPL CALC-SCNC: 10 MMOL/L — SIGNIFICANT CHANGE UP (ref 5–17)
BLD GP AB SCN SERPL QL: NEGATIVE — SIGNIFICANT CHANGE UP
BUN SERPL-MCNC: 49 MG/DL — HIGH (ref 7–23)
CALCIUM SERPL-MCNC: 8.8 MG/DL — SIGNIFICANT CHANGE UP (ref 8.4–10.5)
CHLORIDE SERPL-SCNC: 111 MMOL/L — HIGH (ref 96–108)
CO2 SERPL-SCNC: 24 MMOL/L — SIGNIFICANT CHANGE UP (ref 22–31)
CREAT SERPL-MCNC: 0.92 MG/DL — SIGNIFICANT CHANGE UP (ref 0.5–1.3)
CULTURE RESULTS: NO GROWTH — SIGNIFICANT CHANGE UP
GLUCOSE SERPL-MCNC: 145 MG/DL — HIGH (ref 70–99)
HCT VFR BLD CALC: 18.3 % — CRITICAL LOW (ref 39–50)
HCT VFR BLD CALC: 23.7 % — LOW (ref 39–50)
HGB BLD-MCNC: 5.7 G/DL — CRITICAL LOW (ref 13–17)
HGB BLD-MCNC: 7.3 G/DL — LOW (ref 13–17)
MAGNESIUM SERPL-MCNC: 2.3 MG/DL — SIGNIFICANT CHANGE UP (ref 1.6–2.6)
MCHC RBC-ENTMCNC: 29.7 PG — SIGNIFICANT CHANGE UP (ref 27–34)
MCHC RBC-ENTMCNC: 29.7 PG — SIGNIFICANT CHANGE UP (ref 27–34)
MCHC RBC-ENTMCNC: 30.8 GM/DL — LOW (ref 32–36)
MCHC RBC-ENTMCNC: 31.1 GM/DL — LOW (ref 32–36)
MCV RBC AUTO: 95.3 FL — SIGNIFICANT CHANGE UP (ref 80–100)
MCV RBC AUTO: 96.3 FL — SIGNIFICANT CHANGE UP (ref 80–100)
NRBC # BLD: 0 /100 WBCS — SIGNIFICANT CHANGE UP (ref 0–0)
NRBC # BLD: 0 /100 WBCS — SIGNIFICANT CHANGE UP (ref 0–0)
PHOSPHATE SERPL-MCNC: 3.4 MG/DL — SIGNIFICANT CHANGE UP (ref 2.5–4.5)
PLATELET # BLD AUTO: 495 K/UL — HIGH (ref 150–400)
PLATELET # BLD AUTO: 497 K/UL — HIGH (ref 150–400)
POTASSIUM SERPL-MCNC: 3.7 MMOL/L — SIGNIFICANT CHANGE UP (ref 3.5–5.3)
POTASSIUM SERPL-SCNC: 3.7 MMOL/L — SIGNIFICANT CHANGE UP (ref 3.5–5.3)
RBC # BLD: 1.92 M/UL — LOW (ref 4.2–5.8)
RBC # BLD: 2.46 M/UL — LOW (ref 4.2–5.8)
RBC # FLD: 13.1 % — SIGNIFICANT CHANGE UP (ref 10.3–14.5)
RBC # FLD: 13.2 % — SIGNIFICANT CHANGE UP (ref 10.3–14.5)
RH IG SCN BLD-IMP: POSITIVE — SIGNIFICANT CHANGE UP
SODIUM SERPL-SCNC: 145 MMOL/L — SIGNIFICANT CHANGE UP (ref 135–145)
SPECIMEN SOURCE: SIGNIFICANT CHANGE UP
WBC # BLD: 6.56 K/UL — SIGNIFICANT CHANGE UP (ref 3.8–10.5)
WBC # BLD: 7.03 K/UL — SIGNIFICANT CHANGE UP (ref 3.8–10.5)
WBC # FLD AUTO: 6.56 K/UL — SIGNIFICANT CHANGE UP (ref 3.8–10.5)
WBC # FLD AUTO: 7.03 K/UL — SIGNIFICANT CHANGE UP (ref 3.8–10.5)

## 2021-12-10 PROCEDURE — 99233 SBSQ HOSP IP/OBS HIGH 50: CPT

## 2021-12-10 PROCEDURE — 71045 X-RAY EXAM CHEST 1 VIEW: CPT | Mod: 26

## 2021-12-10 RX ORDER — PANTOPRAZOLE SODIUM 20 MG/1
40 TABLET, DELAYED RELEASE ORAL DAILY
Refills: 0 | Status: DISCONTINUED | OUTPATIENT
Start: 2021-12-11 | End: 2021-12-23

## 2021-12-10 RX ORDER — APIXABAN 2.5 MG/1
5 TABLET, FILM COATED ORAL
Refills: 0 | Status: DISCONTINUED | OUTPATIENT
Start: 2021-12-10 | End: 2022-01-01

## 2021-12-10 RX ADMIN — Medication 4 MILLILITER(S): at 00:15

## 2021-12-10 RX ADMIN — SODIUM CHLORIDE 3 MILLILITER(S): 9 INJECTION INTRAMUSCULAR; INTRAVENOUS; SUBCUTANEOUS at 05:49

## 2021-12-10 RX ADMIN — Medication 3 MILLILITER(S): at 17:06

## 2021-12-10 RX ADMIN — APIXABAN 5 MILLIGRAM(S): 2.5 TABLET, FILM COATED ORAL at 17:20

## 2021-12-10 RX ADMIN — Medication 1 DROP(S): at 00:22

## 2021-12-10 RX ADMIN — Medication 1 DROP(S): at 12:11

## 2021-12-10 RX ADMIN — HUMAN INSULIN 12 UNIT(S): 100 INJECTION, SUSPENSION SUBCUTANEOUS at 17:19

## 2021-12-10 RX ADMIN — SODIUM CHLORIDE 3 MILLILITER(S): 9 INJECTION INTRAMUSCULAR; INTRAVENOUS; SUBCUTANEOUS at 11:56

## 2021-12-10 RX ADMIN — NYSTATIN CREAM 1 APPLICATION(S): 100000 CREAM TOPICAL at 17:20

## 2021-12-10 RX ADMIN — SODIUM CHLORIDE 3 MILLILITER(S): 9 INJECTION INTRAMUSCULAR; INTRAVENOUS; SUBCUTANEOUS at 00:17

## 2021-12-10 RX ADMIN — Medication 1 DROP(S): at 05:32

## 2021-12-10 RX ADMIN — HUMAN INSULIN 12 UNIT(S): 100 INJECTION, SUSPENSION SUBCUTANEOUS at 00:22

## 2021-12-10 RX ADMIN — Medication 4 MILLILITER(S): at 17:06

## 2021-12-10 RX ADMIN — Medication 3 MILLILITER(S): at 23:26

## 2021-12-10 RX ADMIN — Medication 1 DROP(S): at 17:19

## 2021-12-10 RX ADMIN — CHLORHEXIDINE GLUCONATE 15 MILLILITER(S): 213 SOLUTION TOPICAL at 05:33

## 2021-12-10 RX ADMIN — Medication 1 TABLET(S): at 23:27

## 2021-12-10 RX ADMIN — APIXABAN 5 MILLIGRAM(S): 2.5 TABLET, FILM COATED ORAL at 05:33

## 2021-12-10 RX ADMIN — Medication 2: at 12:11

## 2021-12-10 RX ADMIN — PANTOPRAZOLE SODIUM 40 MILLIGRAM(S): 20 TABLET, DELAYED RELEASE ORAL at 12:12

## 2021-12-10 RX ADMIN — NYSTATIN CREAM 1 APPLICATION(S): 100000 CREAM TOPICAL at 05:32

## 2021-12-10 RX ADMIN — Medication 3 MILLILITER(S): at 05:48

## 2021-12-10 RX ADMIN — Medication 4 MILLILITER(S): at 23:25

## 2021-12-10 RX ADMIN — Medication 4 MILLILITER(S): at 11:55

## 2021-12-10 RX ADMIN — Medication 3 MILLILITER(S): at 00:15

## 2021-12-10 RX ADMIN — ATORVASTATIN CALCIUM 40 MILLIGRAM(S): 80 TABLET, FILM COATED ORAL at 22:45

## 2021-12-10 RX ADMIN — HUMAN INSULIN 12 UNIT(S): 100 INJECTION, SUSPENSION SUBCUTANEOUS at 12:12

## 2021-12-10 RX ADMIN — Medication 125 MILLIGRAM(S): at 00:23

## 2021-12-10 RX ADMIN — HUMAN INSULIN 12 UNIT(S): 100 INJECTION, SUSPENSION SUBCUTANEOUS at 06:47

## 2021-12-10 RX ADMIN — Medication 2 MILLIGRAM(S): at 05:31

## 2021-12-10 RX ADMIN — Medication 2: at 06:47

## 2021-12-10 RX ADMIN — Medication 3 MILLILITER(S): at 11:56

## 2021-12-10 RX ADMIN — Medication 4 MILLILITER(S): at 05:49

## 2021-12-10 RX ADMIN — SODIUM CHLORIDE 3 MILLILITER(S): 9 INJECTION INTRAMUSCULAR; INTRAVENOUS; SUBCUTANEOUS at 17:09

## 2021-12-10 RX ADMIN — CHLORHEXIDINE GLUCONATE 15 MILLILITER(S): 213 SOLUTION TOPICAL at 17:20

## 2021-12-10 RX ADMIN — SODIUM CHLORIDE 3 MILLILITER(S): 9 INJECTION INTRAMUSCULAR; INTRAVENOUS; SUBCUTANEOUS at 23:26

## 2021-12-10 NOTE — PROGRESS NOTE ADULT - SUBJECTIVE AND OBJECTIVE BOX
HPI:  61M hx HTN, DM, cardiac stent on ASA. At work complaining of HA ~8:30, starting feeling dizzy and vomiting, HTN/keke when EMS got to him. SBP 220s, HR 50s.   On EMS arrival at 09:39AM 11/4/21, patient seen talking a little, garbling, moving all extremities, then quickly became unresponsive. No known blood thinners.  CTH shows large posterior fossa bleed w/ IVH/SAH/hydro.        ICU Vital Signs Last 24 Hrs  T(C): 34.8 (04 Nov 2021 12:00), Max: 34.8 (04 Nov 2021 12:00)  T(F): 94.6 (04 Nov 2021 12:00), Max: 94.6 (04 Nov 2021 12:00)  HR: 127 (04 Nov 2021 11:30) (109 - 146)  BP: 123/78 (04 Nov 2021 11:30) (123/78 - 245/142)  BP(mean): --  ABP: --  ABP(mean): --  RR: 19 (04 Nov 2021 11:30) (19 - 30)  SpO2: 100% (04 Nov 2021 11:30) (100% - 100%)  11-04    140  |  103  |  18  ----------------------------<  226<H>  3.3<L>   |  19<L>  |  1.24    Ca    9.5      04 Nov 2021 10:16    TPro  7.8  /  Alb  4.8  /  TBili  0.4  /  DBili  x   /  AST  18  /  ALT  14  /  AlkPhos  94  11-04                        12.9   15.29 )-----------( 322      ( 04 Nov 2021 10:16 )             40.5    (04 Nov 2021 12:28)    SURGERY: Suboccipital craniectomy with cervical laminectomy for decompression of medulla and spinal cord  Brain aneurysm surgery  Open tracheostomy   shunt  R PO VPS Certas at 4      EVENTS: No events overnights, CTH yesterday stable, RCU consulted. Secretions improving      ICU Vital Signs Last 24 Hrs  T(C): 37.1 (10 Dec 2021 07:00), Max: 37.2 (09 Dec 2021 19:00)  T(F): 98.7 (10 Dec 2021 07:00), Max: 99 (09 Dec 2021 19:00)  HR: 100 (10 Dec 2021 09:00) (82 - 105)  BP: 135/62 (10 Dec 2021 08:00) (97/54 - 139/66)  BP(mean): 78 (10 Dec 2021 08:00) (64 - 102)  ABP: --  ABP(mean): --  RR: 14 (10 Dec 2021 09:00) (10 - 21)  SpO2: 100% (10 Dec 2021 09:00) (99% - 100%)     12-09 @ 07:01  -  12-10 @ 07:00  --------------------------------------------------------  IN: 2340 mL / OUT: 2175 mL / NET: 165 mL                             7.3    6.56  )-----------( 495      ( 10 Dec 2021 07:19 )             23.7    12-10    145  |  111<H>  |  49<H>  ----------------------------<  145<H>  3.7   |  24  |  0.92    Ca    8.8      10 Dec 2021 06:24  Phos  3.4     12-10  Mg     2.3     12-10              PHYSICAL EXAM:    General: No Acute Distress   Neurological:  EO to nox, downward gaze, does not track, blinks to threat, L pupil sluggish 3 mm, R NR, +corneals, Following Commands ( squeezed R hand, wiggles L toes), trace movement BUE, TF BLE to noxt  Pulmonary: Clear to Auscultation, No Rales, No Rhonchi, No Wheezes   Cardiovascular: S1, S2, Regular Rate and Rhythm   Gastrointestinal: Soft, Nontender, Nondistended   Extremities: No calf tenderness   Incision:       MEDICATIONS:  Antibiotics:      Neurological:   acetaminophen    Suspension .. 650 milliGRAM(s) Oral every 6 hours PRN    Cardiac:   doxazosin 2 milliGRAM(s) Oral daily    Pulm:  acetylcysteine 20%  Inhalation 4 milliLiter(s) Inhalation every 6 hours  albuterol/ipratropium for Nebulization 3 milliLiter(s) Nebulizer every 6 hours  sodium chloride 3%  Inhalation 3 milliLiter(s) Inhalation every 6 hours    Heme:     Other:   artificial  tears Solution 1 Drop(s) Both EYES every 6 hours  atorvastatin 40 milliGRAM(s) Oral at bedtime  chlorhexidine 0.12% Liquid 15 milliLiter(s) Oral Mucosa two times a day  chlorhexidine 4% Liquid 1 Application(s) Topical <User Schedule>  dextrose 50% Injectable 25 Gram(s) IV Push once  dextrose 50% Injectable 12.5 Gram(s) IV Push once  insulin lispro (ADMELOG) corrective regimen sliding scale   SubCutaneous every 6 hours  insulin NPH human recombinant 12 Unit(s) SubCutaneous every 6 hours  nystatin Powder 1 Application(s) Topical two times a day  pantoprazole  Injectable 40 milliGRAM(s) IV Push daily       DEVICES:  [x] Trach, VPS     A/P:  ASSESSMENT: 62 yo admitted to the NSCU for VPS malfunction and pseudomeningocele       Neuro:  CTH 12/9 stable, no OR plans, VPS@4 cont [id number]  Respiratory: trach 6 josi uncuffed to collar, now has cuffed trach  cont suctions, suction as needed  thick secretions mucomyst and duonebs   CV: lipitor Keep -160 mmHg  Endocrine: NPH 12 units q 6 hr  sugar well controlled   cont glucose checks  Goal euglycemia (-180)  Heme/Onc:             DVT ppx: resume eliquis 5 BID hgb stable  Renal:  MICHELLE creatinine improved    ml q 6 hr, striat cath, cardura  ID: afebrile, monitor  GI: cont PEG feeds, ppi bowel regimen standing  Social/Family:   Discharge planning:     Code Status: [x] Full Code [] DNR [] DNI [] Goals of Care:   Disposition: [x] ICU [] Stroke Unit [] RCU []PCU []Floor [] Discharge Home     Patient at high risk for neurologic deterioration, critical care time, excluding procedures: 45 minutes                      HPI:  61M hx HTN, DM, cardiac stent on ASA. At work complaining of HA ~8:30, starting feeling dizzy and vomiting, HTN/keke when EMS got to him. SBP 220s, HR 50s.   On EMS arrival at 09:39AM 11/4/21, patient seen talking a little, garbling, moving all extremities, then quickly became unresponsive. No known blood thinners.  CTH shows large posterior fossa bleed w/ IVH/SAH/hydro.    PAST MEDICAL & SURGICAL HISTORY:  HTN (hypertension)    Diabetes mellitus    Allergies    penicillin (Other)    Intolerances            REVIEW OF SYSTEMS: [x ] Unable to Assess due to neurologic exam   [ ] All ROS addressed below are non-contributory, except:  Neuro: [ ] Headache [ ] Back pain [ ] Numbness [ ] Weakness [ ] Ataxia [ ] Dizziness [ ] Aphasia [ ] Dysarthria [ ] Visual disturbance  Resp: [ ] Shortness of breath/dyspnea, [ ] Orthopnea [ ] Cough  CV: [ ] Chest pain [ ] Palpitation [ ] Lightheadedness [ ] Syncope  Renal: [ ] Thirst [ ] Edema  GI: [ ] Nausea [ ] Emesis [ ] Abdominal pain [ ] Constipation [ ] Diarrhea  Hem: [ ] Hematemesis [ ] bright red blood per rectum  ID: [ ] Fever [ ] Chills [ ] Dysuria  ENT: [ ] Rhinorrhea        O: HGB FALSELY LOW     IT(C): 37.1 (12-10-21 @ 07:00), Max: 37.2 (12-09-21 @ 19:00)  HR: 97 (12-10-21 @ 11:59) (82 - 105)  BP: 114/63 (12-10-21 @ 11:00) (97/54 - 139/66)  RR: 10 (12-10-21 @ 11:00) (10 - 21)  SpO2: 100% (12-10-21 @ 11:59) (99% - 100%)  12-09-21 @ 07:01  -  12-10-21 @ 07:00  --------------------------------------------------------  IN: 2340 mL / OUT: 2175 mL / NET: 165 mL    acetaminophen    Suspension .. 650 milliGRAM(s) Oral every 6 hours PRN  acetylcysteine 20%  Inhalation 4 milliLiter(s) Inhalation every 6 hours  albuterol/ipratropium for Nebulization 3 milliLiter(s) Nebulizer every 6 hours  artificial  tears Solution 1 Drop(s) Both EYES every 6 hours  atorvastatin 40 milliGRAM(s) Oral at bedtime  chlorhexidine 0.12% Liquid 15 milliLiter(s) Oral Mucosa two times a day  chlorhexidine 4% Liquid 1 Application(s) Topical <User Schedule>  dextrose 50% Injectable 25 Gram(s) IV Push once  dextrose 50% Injectable 12.5 Gram(s) IV Push once  doxazosin 2 milliGRAM(s) Oral daily  insulin lispro (ADMELOG) corrective regimen sliding scale   SubCutaneous every 6 hours  insulin NPH human recombinant 12 Unit(s) SubCutaneous every 6 hours  nystatin Powder 1 Application(s) Topical two times a day  pantoprazole  Injectable 40 milliGRAM(s) IV Push daily  sodium chloride 3%  Inhalation 3 milliLiter(s) Inhalation every 6 hours              PHYSICAL EXAM:    General: No Acute Distress   Neurological:  EO to nox, downward gaze, does not track, blinks to threat, L pupil sluggish 3 mm, R NR, +corneals, Following Commands ( squeezed R hand, wiggles L toes), trace movement BUE, TF BLE to noxt  Pulmonary: Clear to Auscultation, No Rales, No Rhonchi, No Wheezes   Cardiovascular: S1, S2, Regular Rate and Rhythm   Gastrointestinal: Soft, Nontender, Nondistended   Extremities: No calf tenderness   Incision:         LABS:  Na: 145 (12-10 @ 06:24), 147 (12-09 @ 17:12), 144 (12-09 @ 00:49), 144 (12-08 @ 00:41)  K: 3.7 (12-10 @ 06:24), 3.7 (12-09 @ 17:12), 4.0 (12-09 @ 00:49), 4.2 (12-08 @ 00:41)  Cl: 111 (12-10 @ 06:24), 112 (12-09 @ 17:12), 110 (12-09 @ 00:49), 106 (12-08 @ 00:41)  CO2: 24 (12-10 @ 06:24), 24 (12-09 @ 17:12), 23 (12-09 @ 00:49), 25 (12-08 @ 00:41)  BUN: 49 (12-10 @ 06:24), 54 (12-09 @ 17:12), 60 (12-09 @ 00:49), 66 (12-08 @ 00:41)  Cr: 0.92 (12-10 @ 06:24), 1.05 (12-09 @ 17:12), 1.20 (12-09 @ 00:49), 1.33 (12-08 @ 00:41)  Glu: 145(12-10 @ 06:24), 156(12-09 @ 17:12), 159(12-09 @ 00:49), 116(12-08 @ 00:41)    Hgb: 7.3 (12-10 @ 07:19), 5.7 (12-10 @ 06:24), 7.4 (12-09 @ 17:12), 7.3 (12-09 @ 01:59), 5.6 (12-09 @ 00:49), 7.9 (12-08 @ 00:41)  Hct: 23.7 (12-10 @ 07:19), 18.3 (12-10 @ 06:24), 24.0 (12-09 @ 17:12), 23.9 (12-09 @ 01:59), 18.2 (12-09 @ 00:49), 25.1 (12-08 @ 00:41)  WBC: 6.56 (12-10 @ 07:19), 7.03 (12-10 @ 06:24), 6.94 (12-09 @ 17:12), 7.00 (12-09 @ 01:59), 8.92 (12-09 @ 00:49), 10.62 (12-08 @ 00:41)  Plt: 495 (12-10 @ 07:19), 497 (12-10 @ 06:24), 434 (12-09 @ 17:12), 424 (12-09 @ 01:59), 473 (12-09 @ 00:49), 451 (12-08 @ 00:41)    INR: 1.37 12-07-21 @ 15:29  PTT: 29.8 12-07-21 @ 15:29      A/P:  ASSESSMENT: 60 yo admitted to the NSCU for VPS malfunction and pseudomeningocele   Neuro: neuro checks q 4 hr   CTH 12/9 stable, no OR plans, VPS@4   Respiratory: trach 6 josi uncuffed to collar, now has cuffed trach  ENT will get reconsult as he will not go to surgery  less frequent suctioning, now every 4 hours    cont suctions, suction as needed  thick secretions mucomyst and duonebs   CV: Keep -160 mmHg  Endocrine: DM on  NPH 12 units q 6 hr  target 120-180   cont glucose checks  Goal euglycemia (-180)  Heme/Onc: hgb erroneously low, repeat is stable             DVT ppx: resume eliquis 5 BID for DVT   Renal:  MICHELLE resolved    ml q 6 hr  bladder scan q 6 hr    straight cath PRN  cardura  ID: afebrile, complicated C.diff   no diarrhea since 4 days   GI: cont PEG feeds, ppi   Social/Family:   Discharge planning:     Code Status: [x] Full Code [] DNR [] DNI [] Goals of Care:   Disposition: [x] ICU [] Stroke Unit [] RCU []PCU []Floor [] Discharge Home     not critical

## 2021-12-10 NOTE — PROGRESS NOTE ADULT - ATTENDING COMMENTS
If ok with Dr. Page, then resume eliquis 5mg BID (if no plans for OR)  Otherwise, if plans for OR, then of course, no a/c and start DVT ppx      Galmer [phone]

## 2021-12-10 NOTE — PROGRESS NOTE ADULT - SUBJECTIVE AND OBJECTIVE BOX
Chief Complaint:  Patient is a 61y old  Male who presents with a chief complaint of ICH (08 Dec 2021 08:58)      Date of service 12-10-21 @ 14:45      Interval Events:   Patient seen and examined. Trach collar 8L 02. PEG tube intact; no redness, drainage, bleeding from peg site. Tube feeds continued.     Hospital Medications:  acetaminophen    Suspension .. 650 milliGRAM(s) Oral every 6 hours PRN  acetylcysteine 20%  Inhalation 4 milliLiter(s) Inhalation every 6 hours  albuterol/ipratropium for Nebulization 3 milliLiter(s) Nebulizer every 6 hours  apixaban 5 milliGRAM(s) Oral two times a day  artificial  tears Solution 1 Drop(s) Both EYES every 6 hours  atorvastatin 40 milliGRAM(s) Oral at bedtime  chlorhexidine 0.12% Liquid 15 milliLiter(s) Oral Mucosa two times a day  chlorhexidine 4% Liquid 1 Application(s) Topical <User Schedule>  dextrose 50% Injectable 25 Gram(s) IV Push once  dextrose 50% Injectable 12.5 Gram(s) IV Push once  doxazosin 2 milliGRAM(s) Oral daily  insulin lispro (ADMELOG) corrective regimen sliding scale   SubCutaneous every 6 hours  insulin NPH human recombinant 12 Unit(s) SubCutaneous every 6 hours  multivitamin 1 Tablet(s) Oral daily  nystatin Powder 1 Application(s) Topical two times a day  sodium chloride 3%  Inhalation 3 milliLiter(s) Inhalation every 6 hours        Review of Systems:  unable to obtain    PHYSICAL EXAM:   Vital Signs:  Vital Signs Last 24 Hrs  T(C): 37.1 (10 Dec 2021 07:00), Max: 37.2 (09 Dec 2021 19:00)  T(F): 98.7 (10 Dec 2021 07:00), Max: 99 (09 Dec 2021 19:00)  HR: 97 (10 Dec 2021 11:59) (82 - 105)  BP: 114/63 (10 Dec 2021 11:00) (97/54 - 139/66)  BP(mean): 76 (10 Dec 2021 11:00) (65 - 96)  RR: 10 (10 Dec 2021 11:00) (10 - 21)  SpO2: 100% (10 Dec 2021 11:59) (99% - 100%)  Daily     Daily       PHYSICAL EXAM:     GENERAL:  Appears stated age, well-groomed, well-nourished, no distress  HEENT:  NC/AT,  conjunctivae anicteric, clear and pink,   NECK: supple, trachea midline, +tracheostomy  CHEST:  Full & symmetric excursion, no increased effort, breath sounds clear  HEART:  Regular rhythm, no JVD  ABDOMEN:  Soft, non-tender, non-distended, normoactive bowel sounds,  no masses , no hepatosplenomegaly, +PEG   EXTREMITIES:  no cyanosis,clubbing or edema  SKIN:  No rash, erythema, or, ecchymoses, no jaundice  NEURO:  non-focal, no asterixis  RECTAL: Deferred      LABS Personally reviewed by me:                        7.3    6.56  )-----------( 495      ( 10 Dec 2021 07:19 )             23.7     Mean Cell Volume: 96.3 fl (12-10-21 @ 07:19)    12-10    145  |  111<H>  |  49<H>  ----------------------------<  145<H>  3.7   |  24  |  0.92    Ca    8.8      10 Dec 2021 06:24  Phos  3.4     12-10  Mg     2.3     12-10                                    7.3    6.56  )-----------( 495      ( 10 Dec 2021 07:19 )             23.7                         5.7    7.03  )-----------( 497      ( 10 Dec 2021 06:24 )             18.3                         7.4    6.94  )-----------( 434      ( 09 Dec 2021 17:12 )             24.0                         7.3    7.00  )-----------( 424      ( 09 Dec 2021 01:59 )             23.9                         5.6    8.92  )-----------( 473      ( 09 Dec 2021 00:49 )             18.2       Imaging personally reviewed by me:

## 2021-12-10 NOTE — PROGRESS NOTE ADULT - SUBJECTIVE AND OBJECTIVE BOX
Vascular Cardiology  Progress note  EMAIL [email or web address]     OFFICE [phone]    INTERVAL HISTORY:  -No acute events overnight.    Allergies  penicillin (Other)    MEDICATIONS:  doxazosin 2 milliGRAM(s) Oral daily  acetylcysteine 20%  Inhalation 4 milliLiter(s) Inhalation every 6 hours  albuterol/ipratropium for Nebulization 3 milliLiter(s) Nebulizer every 6 hours  sodium chloride 3%  Inhalation 3 milliLiter(s) Inhalation every 6 hours  acetaminophen    Suspension .. 650 milliGRAM(s) Oral every 6 hours PRN  pantoprazole  Injectable 40 milliGRAM(s) IV Push daily  atorvastatin 40 milliGRAM(s) Oral at bedtime  dextrose 50% Injectable 25 Gram(s) IV Push once  dextrose 50% Injectable 12.5 Gram(s) IV Push once  insulin lispro (ADMELOG) corrective regimen sliding scale   SubCutaneous every 6 hours  insulin NPH human recombinant 12 Unit(s) SubCutaneous every 6 hours  artificial  tears Solution 1 Drop(s) Both EYES every 6 hours  chlorhexidine 0.12% Liquid 15 milliLiter(s) Oral Mucosa two times a day  chlorhexidine 4% Liquid 1 Application(s) Topical <User Schedule>  nystatin Powder 1 Application(s) Topical two times a day    PAST MEDICAL & SURGICAL HISTORY:  HTN (hypertension)  Diabetes mellitus    SOCIAL HISTORY:  unchanged    REVIEW OF SYSTEMS:  [x] Unable to obtain    PHYSICAL EXAM:  T(C): 37.1 (12-10-21 @ 07:00), Max: 37.2 (12-09-21 @ 19:00)  HR: 100 (12-10-21 @ 09:00) (82 - 105)  BP: 135/62 (12-10-21 @ 08:00) (97/54 - 139/66)  RR: 14 (12-10-21 @ 09:00) (10 - 21)  SpO2: 100% (12-10-21 @ 09:00) (99% - 100%)  Wt(kg): --  I&O's Summary    09 Dec 2021 07:01  -  10 Dec 2021 07:00  --------------------------------------------------------  IN: 2340 mL / OUT: 2175 mL / NET: 165 mL    Appearance: Frail appearing, nonverbal, with trach and PEG  HEENT:   Normal oral mucosa, PERRL, EOMI	  Lymphatic: No lymphadenopathy  Cardiovascular:  S1, S2, RRR, no RMG  Respiratory:  CTA b/l  Psychiatry:  Nonverbal  Gastrointestinal:  Soft, Non-tender, + BS	  Skin: No rashes, No ecchymoses, No cyanosis	  Extremities:  B/l LE with no edema, asymmetry of girth, skin changes; extremities are warm with no ulcers or wounds, no phlegmasia    LABS:	 	    CBC Full  -  ( 10 Dec 2021 07:19 )  WBC Count : 6.56 K/uL  Hemoglobin : 7.3 g/dL  Hematocrit : 23.7 %  Platelet Count - Automated : 495 K/uL  Mean Cell Volume : 96.3 fl  Mean Cell Hemoglobin : 29.7 pg  Mean Cell Hemoglobin Concentration : 30.8 gm/dL  Auto Neutrophil # : x  Auto Lymphocyte # : x  Auto Monocyte # : x  Auto Eosinophil # : x  Auto Basophil # : x  Auto Neutrophil % : x  Auto Lymphocyte % : x  Auto Monocyte % : x  Auto Eosinophil % : x  Auto Basophil % : x    12-10    145  |  111<H>  |  49<H>  ----------------------------<  145<H>  3.7   |  24  |  0.92  12-09    147<H>  |  112<H>  |  54<H>  ----------------------------<  156<H>  3.7   |  24  |  1.05    Ca    8.8      10 Dec 2021 06:24  Ca    9.3      09 Dec 2021 17:12  Phos  3.4     12-10  Phos  3.2     12-09  Mg     2.3     12-10  Mg     2.4     12-09    Assessment:  1. IVH  2. C. difficile diarrhea  3. Gram-negative pneumonia.   4. Respiratory failure.   5. B/l below knee DVTs  8. CAD     Plan:  1. Eliquis was discontinued in light of possible OR for VPS revision. No clear timing as of now for OR; therefore, it is okay to use DVT PPx with Heparin SQ if cleared by NSG for the time being.  2. If there are no imminent plans for OR, recommend resume Eliquis 5mg PO BID again.  3. Otherwise, continue excellent NSICU care.    Thank you,    Please call with any questions:   Service Line: [phone]  Office [phone]  email:  [email or web address]

## 2021-12-10 NOTE — CHART NOTE - NSCHARTNOTEFT_GEN_A_CORE
Nutrition Note - Brief Malnutrition Follow Up     Pt seen by RD for follow up assessment on 12/9 and subsequently diagnosed with moderate protein-calorie malnutrition. Interim events: s/p antibiotics course 2/2 C. diff infection. Diarrhea resolved as per GI note 12/9. Per MD note 12/9, "imaging with increased Pseudomeningocele with possible shunt failure s/p bedside tap; plan per NSG for shunt revision, if not plan for repeat imaging and possible floor transfer if no intervention".     Diet: Glucerna 1.2 at 60ml/hr x 24 hrs + Bolus Free Water 250 q 6 hrs.   EN provision: EN feeds infusing at goal rate of 60ml/hr since 12/9. Appears to be tolerating well.   Last BM: ? 12/6. Diarrhea noted to have resolved. Will monitor. Not on bowel regimen at this time.   Pt continues on insulin lispro sliding scale and NPH to aid in management of BG.     Skin: surgical incision abdominal LAP, crani 11/4, stage III right sacrum, suspected DTI left sacrum  Edema: 1+ generalized    Energy needs:  25- 30 kcal/kg (2932-0029 kcal/kg)  Protein needs:  1.2- 1.5 g/kg (99.6-124.5 g/kg)  Defer fluid needs to medical team  Based on upper .6 pounds (83 kg)    Previous Nutrition Diagnosis: 1) increased nutrient needs 2) acute moderate protein calorie malnutrition   Nutrition Diagnosis is: [x] ongoing  [] resolved [] not applicable       Recommendations:      1. As  tolerated, recommend increase EN feeds to Glucerna 1.2 at 75ml/hr x 24 hrs. To provide (Based on upper IBW 83kg): 1800ml, 2160kcal (26kcal/kg) and 108g protein (1.3g protein/kg).   2. Monitor GI tolerance. RD to remain available to adjust EN formulary, volume/rate PRN.   3. Monitor wt trends/labs/skin integrity/hydration status/bowel regularity.   4. Recommend anat active 2x daily 2/2 hx of antibiotics use.   5. Recommend multivitamin (if no medication contraindications) to aid in prevention of micronutrient deficiencies. Consider Charly packet 2x daily to aid in healing of pressure injuries.

## 2021-12-10 NOTE — PROGRESS NOTE ADULT - ASSESSMENT
61 year old man with respiratory failure d/t ICH    1. ICH  -per neurosurgery,  -s/p  shunt    2. Respiratory failure  -for tracheostomy, will require long term enteral nutrition  -s/p PEG, tolerating feeds  - aspiration precautions     3. Enterobacter PNA  -s/p course of antibiotics     4. afib with RVR    5. Anemia, no overt GI bleed. EGD unremarkable.   -h/h stabilizing  -trend CBC  -monitor for GI bleeding    6. Cdiff infection   -s/p abx course  -PO vanco discontinued as per nsicu team  -diarrhea resolved    7. DVT on a/c  -apixaban resumed  -continue PPI     Attending supervision statement: I have personally seen and examined the patient. I fully participated in the care of this patient. I have made amendments to the documentation where necessary, and agree with the history, physical exam, and plan as outlined by the ACP.      [facility]  Gastroenterology and Hepatology  [address] [place], [place]  Office: [phone]  Cell: [phone]   61 year old man with respiratory failure d/t ICH    1. ICH  -per neurosurgery,  -s/p  shunt    2. Respiratory failure  -for tracheostomy, will require long term enteral nutrition  -s/p PEG, tolerating feeds  - aspiration precautions     3. Enterobacter PNA  -s/p course of antibiotics     4. afib with RVR    5. Anemia, no overt GI bleed. EGD unremarkable.   -h/h stabilizing  -trend CBC  -monitor for GI bleeding    6. Cdiff infection   -s/p abx course  -PO vanco discontinued as per nsicu team  -diarrhea resolved    7. DVT on a/c  -apixaban discontinued initially for possible VPS revision; apixaban 5mg resumed  -continue PPI     Attending supervision statement: I have personally seen and examined the patient. I fully participated in the care of this patient. I have made amendments to the documentation where necessary, and agree with the history, physical exam, and plan as outlined by the ACP.      [facility]  Gastroenterology and Hepatology  [address] [place], [place]  Office: [phone]  Cell: [phone]

## 2021-12-10 NOTE — PROGRESS NOTE ADULT - TIME BILLING
60 yo admitted to the NSCU for VPS malfunction and pseudomeningocele  Neuro: neuro checks q 4 hr   CTH 12/9 stable, no OR plans, VPS@4   tach collar   afebrile  no diarrhea  hgb stable   resume apixaban
posterior fossa hemorrhage
Reviewed vitals/data. Examined. Care coordinated.

## 2021-12-11 LAB
CULTURE RESULTS: SIGNIFICANT CHANGE UP
CULTURE RESULTS: SIGNIFICANT CHANGE UP
SPECIMEN SOURCE: SIGNIFICANT CHANGE UP
SPECIMEN SOURCE: SIGNIFICANT CHANGE UP

## 2021-12-11 RX ADMIN — Medication 1 DROP(S): at 00:15

## 2021-12-11 RX ADMIN — SODIUM CHLORIDE 3 MILLILITER(S): 9 INJECTION INTRAMUSCULAR; INTRAVENOUS; SUBCUTANEOUS at 18:06

## 2021-12-11 RX ADMIN — NYSTATIN CREAM 1 APPLICATION(S): 100000 CREAM TOPICAL at 05:32

## 2021-12-11 RX ADMIN — PANTOPRAZOLE SODIUM 40 MILLIGRAM(S): 20 TABLET, DELAYED RELEASE ORAL at 12:40

## 2021-12-11 RX ADMIN — Medication 3 MILLILITER(S): at 12:44

## 2021-12-11 RX ADMIN — Medication 3 MILLILITER(S): at 23:38

## 2021-12-11 RX ADMIN — ATORVASTATIN CALCIUM 40 MILLIGRAM(S): 80 TABLET, FILM COATED ORAL at 21:34

## 2021-12-11 RX ADMIN — Medication 4 MILLILITER(S): at 23:38

## 2021-12-11 RX ADMIN — Medication 3 MILLILITER(S): at 05:30

## 2021-12-11 RX ADMIN — Medication 4 MILLILITER(S): at 18:05

## 2021-12-11 RX ADMIN — HUMAN INSULIN 12 UNIT(S): 100 INJECTION, SUSPENSION SUBCUTANEOUS at 05:43

## 2021-12-11 RX ADMIN — CHLORHEXIDINE GLUCONATE 15 MILLILITER(S): 213 SOLUTION TOPICAL at 18:02

## 2021-12-11 RX ADMIN — HUMAN INSULIN 12 UNIT(S): 100 INJECTION, SUSPENSION SUBCUTANEOUS at 00:15

## 2021-12-11 RX ADMIN — Medication 4 MILLILITER(S): at 12:39

## 2021-12-11 RX ADMIN — APIXABAN 5 MILLIGRAM(S): 2.5 TABLET, FILM COATED ORAL at 18:10

## 2021-12-11 RX ADMIN — SODIUM CHLORIDE 3 MILLILITER(S): 9 INJECTION INTRAMUSCULAR; INTRAVENOUS; SUBCUTANEOUS at 05:29

## 2021-12-11 RX ADMIN — Medication 3 MILLILITER(S): at 18:05

## 2021-12-11 RX ADMIN — Medication 1 DROP(S): at 05:28

## 2021-12-11 RX ADMIN — SODIUM CHLORIDE 3 MILLILITER(S): 9 INJECTION INTRAMUSCULAR; INTRAVENOUS; SUBCUTANEOUS at 23:37

## 2021-12-11 RX ADMIN — Medication 2: at 18:02

## 2021-12-11 RX ADMIN — Medication 2 MILLIGRAM(S): at 05:32

## 2021-12-11 RX ADMIN — HUMAN INSULIN 12 UNIT(S): 100 INJECTION, SUSPENSION SUBCUTANEOUS at 23:39

## 2021-12-11 RX ADMIN — NYSTATIN CREAM 1 APPLICATION(S): 100000 CREAM TOPICAL at 18:11

## 2021-12-11 RX ADMIN — HUMAN INSULIN 12 UNIT(S): 100 INJECTION, SUSPENSION SUBCUTANEOUS at 18:03

## 2021-12-11 RX ADMIN — Medication 2: at 05:45

## 2021-12-11 RX ADMIN — Medication 1 DROP(S): at 12:33

## 2021-12-11 RX ADMIN — Medication 1 TABLET(S): at 12:44

## 2021-12-11 RX ADMIN — APIXABAN 5 MILLIGRAM(S): 2.5 TABLET, FILM COATED ORAL at 05:31

## 2021-12-11 RX ADMIN — Medication 4 MILLILITER(S): at 05:28

## 2021-12-11 RX ADMIN — SODIUM CHLORIDE 3 MILLILITER(S): 9 INJECTION INTRAMUSCULAR; INTRAVENOUS; SUBCUTANEOUS at 12:00

## 2021-12-11 RX ADMIN — HUMAN INSULIN 12 UNIT(S): 100 INJECTION, SUSPENSION SUBCUTANEOUS at 12:36

## 2021-12-11 RX ADMIN — Medication 1 DROP(S): at 18:10

## 2021-12-11 RX ADMIN — CHLORHEXIDINE GLUCONATE 15 MILLILITER(S): 213 SOLUTION TOPICAL at 05:30

## 2021-12-11 RX ADMIN — Medication 2: at 23:38

## 2021-12-11 NOTE — PROGRESS NOTE ADULT - ASSESSMENT
61 year old man with respiratory failure d/t ICH    1. ICH  -per neurosurgery,  -s/p  shunt    2. Respiratory failure  -for tracheostomy, will require long term enteral nutrition  -s/p PEG, tolerating feeds  - aspiration precautions     3. Enterobacter PNA  -s/p course of antibiotics     4. afib with RVR    5. Anemia, no overt GI bleed. EGD unremarkable.   -h/h stabilizing  -trend CBC  -monitor for GI bleeding    6. Cdiff infection   -s/p abx course  -PO vanco discontinued as per nsicu team  -diarrhea resolved    7. DVT on a/c  -apixaban discontinued initially for possible VPS revision; apixaban 5mg resumed  -continue PPI     Attending supervision statement: I have personally seen and examined the patient. I fully participated in the care of this patient. I have made amendments to the documentation where necessary, and agree with the history, physical exam, and plan as outlined by the ACP.      [facility]  Gastroenterology and Hepatology  [address] [place], [place]  Office: [phone]  Cell: [phone]

## 2021-12-11 NOTE — PROGRESS NOTE ADULT - SUBJECTIVE AND OBJECTIVE BOX
CC: f/u for c diff colitis & post op infection    Patient reports nothing     REVIEW OF SYSTEMS:  All other review of systems negative (Comprehensive ROS)    Antimicrobials Day #  :    Other Medications Reviewed    T(F): 97.3 (12-11-21 @ 12:50), Max: 99.4 (12-11-21 @ 08:00)  HR: 107 (12-11-21 @ 16:34)  BP: 109/66 (12-11-21 @ 12:50)  RR: 20 (12-11-21 @ 16:34)  SpO2: 99% (12-11-21 @ 16:34)  Wt(kg): --    PHYSICAL EXAM:  General: poorly interactive  Eyes:  anicteric, no conjunctival injection, no discharge  Neck: supple, trach  Lungs: scattered rhonchi  Heart: regular rate and rhythm; no murmurs  Abdomen: soft, nondistended, nontender, peg +  Skin: no lesions  Extremities: no clubbing, cyanosis, trace edema  Neurologic: non interactive    LAB RESULTS:                        7.3    6.56  )-----------( 495      ( 10 Dec 2021 07:19 )             23.7     12-10    145  |  111<H>  |  49<H>  ----------------------------<  145<H>  3.7   |  24  |  0.92    Ca    8.8      10 Dec 2021 06:24  Phos  3.4     12-10  Mg     2.3     12-10    MICROBIOLOGY:  RECENT CULTURES:  12-07 @ 21:52 .CSF CSF     No growth    polymorphonuclear leukocytes seen  No organisms seen  by cytocentrifuge    12-07 @ 02:54 Catheterized Catheterized     No growth      12-06 @ 22:10 .Blood Blood     No growth to date.      12-06 @ 22:08 .Blood Blood     No growth to date.      RADIOLOGY REVIEWED:

## 2021-12-11 NOTE — PROGRESS NOTE ADULT - ASSESSMENT
62 yo male with PMH of HTN   Admitted on 11/4 with severe headache and found to have cerebellar bleed.  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  S/p trach and peg.   He has distal DVT's - on apixaban.  He received cefepime 11/4-11/21 for respiratory coverage.  Developed watery C diff diarrhea on 11/26 - started on oral vancomycin   Then restarted on cefepime on 11/28 for concerns of pneumonia since had low grade temps. Giorgio neb added later  But CXR's remained clear.  Ct of brain showed residual blood.  Pseudomonas in sputum found to be resistant to carbapenems & quinolones. Sputum isolated strep pneumoniae as well    11/28 urine and blood cultures are negative.  Cefepime completed on 12/06 & Giorgio nebs on 12/07/21  Failed TOV & teixeira was replaced for retention of 1000 ml of urine on 12/06/21 PM  Spiked a fever to 102F ? in response to retention  UA with 3 WBCs, no nit or LE, large blood   CXR with clear lungs  CTH revealed a Pseudomeningocele, s/p tap with gram stain without organism  CSF finalized as no growth  Blood & urine cx also finalized as no growth  Completed treatment for C diff w 2 wks of oral vanco on 12/10/21  Diarrhea resolved    Suggest:  No indication for additional antibiotics   Supportive care per NS and medicine  Please call us if further input is needed, thank you.

## 2021-12-11 NOTE — PROGRESS NOTE ADULT - ASSESSMENT
ASSESSMENT: 60 yo admitted to the NSCU for VPS malfunction and pseudomeningocele     xfer to floor  pending dopplers

## 2021-12-11 NOTE — PROGRESS NOTE ADULT - SUBJECTIVE AND OBJECTIVE BOX
PHYSICAL EXAM:    General: No Acute Distress     Neurological: EO to nox, downward gaze, does not track, blinks to threat, L pupil sluggish 3 mm, R NR, +corneals, Following Commands ( squeezed R hand, wiggles L toes), trace movement BUE, TF BLE to nox    Pulmonary: Clear to Auscultation, No Rales, No Rhonchi, No Wheezes     Cardiovascular: S1, S2, Regular Rate and Rhythm     Gastrointestinal: Soft, Nontender, Nondistended     Incision:

## 2021-12-12 PROCEDURE — 70450 CT HEAD/BRAIN W/O DYE: CPT | Mod: 26,59

## 2021-12-12 RX ADMIN — HUMAN INSULIN 12 UNIT(S): 100 INJECTION, SUSPENSION SUBCUTANEOUS at 17:52

## 2021-12-12 RX ADMIN — Medication 1 TABLET(S): at 12:09

## 2021-12-12 RX ADMIN — CHLORHEXIDINE GLUCONATE 15 MILLILITER(S): 213 SOLUTION TOPICAL at 17:52

## 2021-12-12 RX ADMIN — APIXABAN 5 MILLIGRAM(S): 2.5 TABLET, FILM COATED ORAL at 17:54

## 2021-12-12 RX ADMIN — HUMAN INSULIN 12 UNIT(S): 100 INJECTION, SUSPENSION SUBCUTANEOUS at 12:09

## 2021-12-12 RX ADMIN — Medication 2: at 05:25

## 2021-12-12 RX ADMIN — NYSTATIN CREAM 1 APPLICATION(S): 100000 CREAM TOPICAL at 05:30

## 2021-12-12 RX ADMIN — Medication 3 MILLILITER(S): at 05:29

## 2021-12-12 RX ADMIN — Medication 4 MILLILITER(S): at 05:29

## 2021-12-12 RX ADMIN — Medication 4 MILLILITER(S): at 17:50

## 2021-12-12 RX ADMIN — Medication 1 DROP(S): at 05:56

## 2021-12-12 RX ADMIN — Medication 3 MILLILITER(S): at 17:52

## 2021-12-12 RX ADMIN — SODIUM CHLORIDE 3 MILLILITER(S): 9 INJECTION INTRAMUSCULAR; INTRAVENOUS; SUBCUTANEOUS at 17:51

## 2021-12-12 RX ADMIN — Medication 1 DROP(S): at 17:53

## 2021-12-12 RX ADMIN — Medication 2: at 17:53

## 2021-12-12 RX ADMIN — SODIUM CHLORIDE 3 MILLILITER(S): 9 INJECTION INTRAMUSCULAR; INTRAVENOUS; SUBCUTANEOUS at 12:08

## 2021-12-12 RX ADMIN — Medication 2 MILLIGRAM(S): at 05:31

## 2021-12-12 RX ADMIN — CHLORHEXIDINE GLUCONATE 15 MILLILITER(S): 213 SOLUTION TOPICAL at 05:30

## 2021-12-12 RX ADMIN — SODIUM CHLORIDE 3 MILLILITER(S): 9 INJECTION INTRAMUSCULAR; INTRAVENOUS; SUBCUTANEOUS at 05:29

## 2021-12-12 RX ADMIN — Medication 3 MILLILITER(S): at 12:08

## 2021-12-12 RX ADMIN — APIXABAN 5 MILLIGRAM(S): 2.5 TABLET, FILM COATED ORAL at 05:31

## 2021-12-12 RX ADMIN — PANTOPRAZOLE SODIUM 40 MILLIGRAM(S): 20 TABLET, DELAYED RELEASE ORAL at 12:10

## 2021-12-12 RX ADMIN — Medication 2: at 12:09

## 2021-12-12 RX ADMIN — NYSTATIN CREAM 1 APPLICATION(S): 100000 CREAM TOPICAL at 17:54

## 2021-12-12 RX ADMIN — HUMAN INSULIN 12 UNIT(S): 100 INJECTION, SUSPENSION SUBCUTANEOUS at 05:27

## 2021-12-12 RX ADMIN — Medication 1 DROP(S): at 12:08

## 2021-12-12 NOTE — PROGRESS NOTE ADULT - SUBJECTIVE AND OBJECTIVE BOX
Chief Complaint:  Patient is a 61y old  Male who presents with a chief complaint of ICH (08 Dec 2021 08:58)      Date of service 12-10-21 @ 14:45      Interval Events:   no diarrhea    Hospital Medications:  acetaminophen    Suspension .. 650 milliGRAM(s) Oral every 6 hours PRN  acetylcysteine 20%  Inhalation 4 milliLiter(s) Inhalation every 6 hours  albuterol/ipratropium for Nebulization 3 milliLiter(s) Nebulizer every 6 hours  apixaban 5 milliGRAM(s) Oral two times a day  artificial  tears Solution 1 Drop(s) Both EYES every 6 hours  atorvastatin 40 milliGRAM(s) Oral at bedtime  chlorhexidine 0.12% Liquid 15 milliLiter(s) Oral Mucosa two times a day  chlorhexidine 4% Liquid 1 Application(s) Topical <User Schedule>  dextrose 50% Injectable 25 Gram(s) IV Push once  dextrose 50% Injectable 12.5 Gram(s) IV Push once  doxazosin 2 milliGRAM(s) Oral daily  insulin lispro (ADMELOG) corrective regimen sliding scale   SubCutaneous every 6 hours  insulin NPH human recombinant 12 Unit(s) SubCutaneous every 6 hours  multivitamin 1 Tablet(s) Oral daily  nystatin Powder 1 Application(s) Topical two times a day  sodium chloride 3%  Inhalation 3 milliLiter(s) Inhalation every 6 hours        Review of Systems:  unable to obtain    PHYSICAL EXAM:   Vital Signs:  Vital Signs Last 24 Hrs  T(C): 37.1 (10 Dec 2021 07:00), Max: 37.2 (09 Dec 2021 19:00)  T(F): 98.7 (10 Dec 2021 07:00), Max: 99 (09 Dec 2021 19:00)  HR: 97 (10 Dec 2021 11:59) (82 - 105)  BP: 114/63 (10 Dec 2021 11:00) (97/54 - 139/66)  BP(mean): 76 (10 Dec 2021 11:00) (65 - 96)  RR: 10 (10 Dec 2021 11:00) (10 - 21)  SpO2: 100% (10 Dec 2021 11:59) (99% - 100%)  Daily     Daily       PHYSICAL EXAM:     GENERAL:  Appears stated age, well-groomed, well-nourished, no distress  HEENT:  NC/AT,  conjunctivae anicteric, clear and pink,   NECK: supple, trachea midline, +tracheostomy  CHEST:  Full & symmetric excursion, no increased effort, breath sounds clear  HEART:  Regular rhythm, no JVD  ABDOMEN:  Soft, non-tender, non-distended, normoactive bowel sounds,  no masses , no hepatosplenomegaly, +PEG   EXTREMITIES:  no cyanosis,clubbing or edema  SKIN:  No rash, erythema, or, ecchymoses, no jaundice  NEURO:  non-focal, no asterixis  RECTAL: Deferred      LABS Personally reviewed by me:                        7.3    6.56  )-----------( 495      ( 10 Dec 2021 07:19 )             23.7     Mean Cell Volume: 96.3 fl (12-10-21 @ 07:19)    12-10    145  |  111<H>  |  49<H>  ----------------------------<  145<H>  3.7   |  24  |  0.92    Ca    8.8      10 Dec 2021 06:24  Phos  3.4     12-10  Mg     2.3     12-10                                    7.3    6.56  )-----------( 495      ( 10 Dec 2021 07:19 )             23.7                         5.7    7.03  )-----------( 497      ( 10 Dec 2021 06:24 )             18.3                         7.4    6.94  )-----------( 434      ( 09 Dec 2021 17:12 )             24.0                         7.3    7.00  )-----------( 424      ( 09 Dec 2021 01:59 )             23.9                         5.6    8.92  )-----------( 473      ( 09 Dec 2021 00:49 )             18.2       Imaging personally reviewed by me:

## 2021-12-12 NOTE — PROGRESS NOTE ADULT - ASSESSMENT
61 year old man with respiratory failure d/t ICH    1. ICH  -per neurosurgery,  -s/p  shunt    2. Respiratory failure  -for tracheostomy, will require long term enteral nutrition  -s/p PEG, tolerating feeds  - aspiration precautions     3. Enterobacter PNA  -s/p course of antibiotics     4. afib with RVR    5. Anemia, no overt GI bleed. EGD unremarkable.   -h/h stabilizing  -trend CBC  -monitor for GI bleeding    6. Cdiff infection   -s/p abx course  -PO vanco discontinued   -diarrhea resolved    7. DVT on a/c  -apixaban 5mg resumed  -continue PPI     Attending supervision statement: I have personally seen and examined the patient. I fully participated in the care of this patient. I have made amendments to the documentation where necessary, and agree with the history, physical exam, and plan as outlined by the ACP.      [facility]  Gastroenterology and Hepatology  [address] [place], [place]  Office: [phone]  Cell: [phone]

## 2021-12-12 NOTE — PROGRESS NOTE ADULT - ASSESSMENT
ASSESSMENT: 62 yo admitted to the NSCU for VPS malfunction and pseudomeningocele     - Pending CHUYITA  - f/u dopplers

## 2021-12-13 LAB
ANION GAP SERPL CALC-SCNC: 10 MMOL/L — SIGNIFICANT CHANGE UP (ref 5–17)
ANION GAP SERPL CALC-SCNC: 11 MMOL/L — SIGNIFICANT CHANGE UP (ref 5–17)
BUN SERPL-MCNC: 43 MG/DL — HIGH (ref 7–23)
BUN SERPL-MCNC: 46 MG/DL — HIGH (ref 7–23)
CALCIUM SERPL-MCNC: 9.3 MG/DL — SIGNIFICANT CHANGE UP (ref 8.4–10.5)
CALCIUM SERPL-MCNC: 9.4 MG/DL — SIGNIFICANT CHANGE UP (ref 8.4–10.5)
CHLORIDE SERPL-SCNC: 108 MMOL/L — SIGNIFICANT CHANGE UP (ref 96–108)
CHLORIDE SERPL-SCNC: 108 MMOL/L — SIGNIFICANT CHANGE UP (ref 96–108)
CO2 SERPL-SCNC: 29 MMOL/L — SIGNIFICANT CHANGE UP (ref 22–31)
CO2 SERPL-SCNC: 31 MMOL/L — SIGNIFICANT CHANGE UP (ref 22–31)
CREAT SERPL-MCNC: 0.98 MG/DL — SIGNIFICANT CHANGE UP (ref 0.5–1.3)
CREAT SERPL-MCNC: 0.98 MG/DL — SIGNIFICANT CHANGE UP (ref 0.5–1.3)
GLUCOSE SERPL-MCNC: 156 MG/DL — HIGH (ref 70–99)
GLUCOSE SERPL-MCNC: 165 MG/DL — HIGH (ref 70–99)
HCT VFR BLD CALC: 25.6 % — LOW (ref 39–50)
HGB BLD-MCNC: 7.9 G/DL — LOW (ref 13–17)
MCHC RBC-ENTMCNC: 29.9 PG — SIGNIFICANT CHANGE UP (ref 27–34)
MCHC RBC-ENTMCNC: 30.9 GM/DL — LOW (ref 32–36)
MCV RBC AUTO: 97 FL — SIGNIFICANT CHANGE UP (ref 80–100)
NRBC # BLD: 0 /100 WBCS — SIGNIFICANT CHANGE UP (ref 0–0)
PLATELET # BLD AUTO: 443 K/UL — HIGH (ref 150–400)
POTASSIUM SERPL-MCNC: 4.2 MMOL/L — SIGNIFICANT CHANGE UP (ref 3.5–5.3)
POTASSIUM SERPL-MCNC: 4.3 MMOL/L — SIGNIFICANT CHANGE UP (ref 3.5–5.3)
POTASSIUM SERPL-SCNC: 4.2 MMOL/L — SIGNIFICANT CHANGE UP (ref 3.5–5.3)
POTASSIUM SERPL-SCNC: 4.3 MMOL/L — SIGNIFICANT CHANGE UP (ref 3.5–5.3)
PROCALCITONIN SERPL-MCNC: 0.45 NG/ML — HIGH (ref 0.02–0.1)
RBC # BLD: 2.64 M/UL — LOW (ref 4.2–5.8)
RBC # FLD: 13.6 % — SIGNIFICANT CHANGE UP (ref 10.3–14.5)
SARS-COV-2 RNA SPEC QL NAA+PROBE: SIGNIFICANT CHANGE UP
SODIUM SERPL-SCNC: 147 MMOL/L — HIGH (ref 135–145)
SODIUM SERPL-SCNC: 150 MMOL/L — HIGH (ref 135–145)
WBC # BLD: 8.99 K/UL — SIGNIFICANT CHANGE UP (ref 3.8–10.5)
WBC # FLD AUTO: 8.99 K/UL — SIGNIFICANT CHANGE UP (ref 3.8–10.5)

## 2021-12-13 PROCEDURE — 71045 X-RAY EXAM CHEST 1 VIEW: CPT | Mod: 26

## 2021-12-13 PROCEDURE — 99233 SBSQ HOSP IP/OBS HIGH 50: CPT

## 2021-12-13 PROCEDURE — 99232 SBSQ HOSP IP/OBS MODERATE 35: CPT

## 2021-12-13 RX ORDER — ACETAMINOPHEN 500 MG
1000 TABLET ORAL ONCE
Refills: 0 | Status: COMPLETED | OUTPATIENT
Start: 2021-12-13 | End: 2021-12-22

## 2021-12-13 RX ORDER — SODIUM CHLORIDE 9 MG/ML
500 INJECTION INTRAMUSCULAR; INTRAVENOUS; SUBCUTANEOUS ONCE
Refills: 0 | Status: DISCONTINUED | OUTPATIENT
Start: 2021-12-13 | End: 2021-12-16

## 2021-12-13 RX ORDER — METOPROLOL TARTRATE 50 MG
12.5 TABLET ORAL
Refills: 0 | Status: DISCONTINUED | OUTPATIENT
Start: 2021-12-13 | End: 2022-01-26

## 2021-12-13 RX ADMIN — NYSTATIN CREAM 1 APPLICATION(S): 100000 CREAM TOPICAL at 17:16

## 2021-12-13 RX ADMIN — Medication 4 MILLILITER(S): at 17:09

## 2021-12-13 RX ADMIN — Medication 1 TABLET(S): at 13:48

## 2021-12-13 RX ADMIN — APIXABAN 5 MILLIGRAM(S): 2.5 TABLET, FILM COATED ORAL at 05:45

## 2021-12-13 RX ADMIN — Medication 2: at 05:43

## 2021-12-13 RX ADMIN — ATORVASTATIN CALCIUM 40 MILLIGRAM(S): 80 TABLET, FILM COATED ORAL at 00:51

## 2021-12-13 RX ADMIN — Medication 3 MILLILITER(S): at 13:47

## 2021-12-13 RX ADMIN — PANTOPRAZOLE SODIUM 40 MILLIGRAM(S): 20 TABLET, DELAYED RELEASE ORAL at 13:49

## 2021-12-13 RX ADMIN — APIXABAN 5 MILLIGRAM(S): 2.5 TABLET, FILM COATED ORAL at 17:10

## 2021-12-13 RX ADMIN — Medication 4 MILLILITER(S): at 23:40

## 2021-12-13 RX ADMIN — Medication 4 MILLILITER(S): at 13:48

## 2021-12-13 RX ADMIN — Medication 3 MILLILITER(S): at 00:52

## 2021-12-13 RX ADMIN — HUMAN INSULIN 12 UNIT(S): 100 INJECTION, SUSPENSION SUBCUTANEOUS at 23:41

## 2021-12-13 RX ADMIN — CHLORHEXIDINE GLUCONATE 15 MILLILITER(S): 213 SOLUTION TOPICAL at 17:16

## 2021-12-13 RX ADMIN — Medication 4 MILLILITER(S): at 05:45

## 2021-12-13 RX ADMIN — Medication 3 MILLILITER(S): at 17:09

## 2021-12-13 RX ADMIN — Medication 2: at 00:51

## 2021-12-13 RX ADMIN — Medication 1 DROP(S): at 00:52

## 2021-12-13 RX ADMIN — HUMAN INSULIN 12 UNIT(S): 100 INJECTION, SUSPENSION SUBCUTANEOUS at 05:43

## 2021-12-13 RX ADMIN — Medication 2 MILLIGRAM(S): at 05:45

## 2021-12-13 RX ADMIN — SODIUM CHLORIDE 3 MILLILITER(S): 9 INJECTION INTRAMUSCULAR; INTRAVENOUS; SUBCUTANEOUS at 23:40

## 2021-12-13 RX ADMIN — HUMAN INSULIN 12 UNIT(S): 100 INJECTION, SUSPENSION SUBCUTANEOUS at 13:47

## 2021-12-13 RX ADMIN — Medication 3 MILLILITER(S): at 23:40

## 2021-12-13 RX ADMIN — NYSTATIN CREAM 1 APPLICATION(S): 100000 CREAM TOPICAL at 05:45

## 2021-12-13 RX ADMIN — Medication 2: at 23:43

## 2021-12-13 RX ADMIN — HUMAN INSULIN 12 UNIT(S): 100 INJECTION, SUSPENSION SUBCUTANEOUS at 00:54

## 2021-12-13 RX ADMIN — SODIUM CHLORIDE 3 MILLILITER(S): 9 INJECTION INTRAMUSCULAR; INTRAVENOUS; SUBCUTANEOUS at 05:44

## 2021-12-13 RX ADMIN — Medication 1 DROP(S): at 13:46

## 2021-12-13 RX ADMIN — Medication 3 MILLILITER(S): at 06:05

## 2021-12-13 RX ADMIN — HUMAN INSULIN 12 UNIT(S): 100 INJECTION, SUSPENSION SUBCUTANEOUS at 18:03

## 2021-12-13 RX ADMIN — SODIUM CHLORIDE 3 MILLILITER(S): 9 INJECTION INTRAMUSCULAR; INTRAVENOUS; SUBCUTANEOUS at 00:53

## 2021-12-13 RX ADMIN — Medication 1 DROP(S): at 05:43

## 2021-12-13 RX ADMIN — CHLORHEXIDINE GLUCONATE 15 MILLILITER(S): 213 SOLUTION TOPICAL at 05:45

## 2021-12-13 RX ADMIN — Medication 4 MILLILITER(S): at 00:53

## 2021-12-13 RX ADMIN — Medication 650 MILLIGRAM(S): at 23:15

## 2021-12-13 RX ADMIN — Medication 12.5 MILLIGRAM(S): at 17:16

## 2021-12-13 RX ADMIN — Medication 1 DROP(S): at 23:38

## 2021-12-13 RX ADMIN — SODIUM CHLORIDE 3 MILLILITER(S): 9 INJECTION INTRAMUSCULAR; INTRAVENOUS; SUBCUTANEOUS at 17:17

## 2021-12-13 RX ADMIN — Medication 1 DROP(S): at 17:09

## 2021-12-13 RX ADMIN — SODIUM CHLORIDE 3 MILLILITER(S): 9 INJECTION INTRAMUSCULAR; INTRAVENOUS; SUBCUTANEOUS at 13:48

## 2021-12-13 RX ADMIN — Medication 4: at 13:49

## 2021-12-13 RX ADMIN — Medication 2: at 18:02

## 2021-12-13 NOTE — PROGRESS NOTE ADULT - ASSESSMENT
61M hx HTN, DM, cardiac stent on ASA. At work complaining of HA ~8:30, starting feeling dizzy and vomiting, HTN/keke when EMS got to him. SBP 220s, HR 50s.     On EMS arrival at 09:39AM 11/4/21, patient seen talking a little, garbling, moving all extremities, then quickly became unresponsive. No known blood thinners.    CTH shows large posterior fossa bleed w/ IVH/SAH/hydro.    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, s/p crani for PICA aneurysm resection on 11/10/2021, s/p trach/PEG on 11/19/21, s/p insertion of right parietal ventriculoperitoneal shunt (Certas @ 4) on 11/23/2021 11/22 transferred to ICU for lethargy and pseudomeningocele and shunt malfunction  Had a pseudomengiocele tap   11/23 cps revision certa @ 5       PLAN:  Neuro:   -q4 hour neuro checks  -tylenol for pain control and fevers  -out of bed with assistance  -pt/ot - subacute rehab upon discharge  -VPS Certas @4  Respiratory:   - on trach collar  - suction prn- moderate amount of secretions  - continue 3% sodium chloride inhalationCV:  -keep sbp 100-140  -atorvastatin for hyperlipidemia   Endocrine:    -nph and JOSE MARTIN for glucose control  -keep fingersticks 100-180  DVT ppx:   - hep sq  - persistent bilateral below knee dvt on eliquis 5 bid   ID:   -afebrile   -po vanco for c. diff course completed   -ID following  - Gram-negative pneumonia- sputum culture grew enterobacter and most recently pseudomonas.  GI:    - tube feeds  PT/OT:   - CHUYITA

## 2021-12-13 NOTE — PROGRESS NOTE ADULT - PROBLEM SELECTOR PLAN 6
right soleal vein DVT and persistent left posterior tibial, peroneal, gastrocnemius and soleal vein DVT without proximal propagation on last duplex on 11/24  - started on Eliquis 12/2--> monitor for bleed   - appreciate vascular cardiology input

## 2021-12-13 NOTE — PROGRESS NOTE ADULT - SUBJECTIVE AND OBJECTIVE BOX
SUBJECTIVE: Patient seen and examined.  Patient opens eyes to noxious.  No overnight events per the nurse     Vital Signs Last 24 Hrs  T(C): 36.7 (13 Dec 2021 07:30), Max: 37.7 (12 Dec 2021 23:31)  T(F): 98.1 (13 Dec 2021 07:30), Max: 99.9 (13 Dec 2021 04:41)  HR: 109 (13 Dec 2021 07:30) (102 - 109)  BP: 130/79 (13 Dec 2021 07:30) (124/81 - 137/80)  BP(mean): --  RR: 19 (13 Dec 2021 07:30) (18 - 19)  SpO2: 96% (13 Dec 2021 07:30) (94% - 100%)    PHYSICAL EXAM:    Constitutional: No Acute Distress     Neurological: opens eyes to noxious, not following commands, localizes on uppers lower extremity withdraws   Pulmonary: Clear to Auscultation, No rales, No rhonchi, No wheezes     Cardiovascular: S1, S2, Regular rate and rhythm     Gastrointestinal: Soft, Non-tender, Non-distended     Extremities: No calf tenderness     Incision: c/d/i no pseudomeningocele to palpations   able to pump and refill the shunt   LABS:                        7.9    8.99  )-----------( 443      ( 13 Dec 2021 06:24 )             25.6    12-13    147<H>  |  108  |  43<H>  ----------------------------<  165<H>  4.3   |  29  |  0.98    Ca    9.3      13 Dec 2021 06:20        12-12 @ 07:01  -  12-13 @ 07:00  --------------------------------------------------------  IN: 2920 mL / OUT: 1900 mL / NET: 1020 mL        MEDICATIONS:  Anticoagulation:   apixaban 5 milliGRAM(s) Oral two times a day    Antibiotics:    Endo:  atorvastatin 40 milliGRAM(s) Oral at bedtime  dextrose 50% Injectable 25 Gram(s) IV Push once  dextrose 50% Injectable 12.5 Gram(s) IV Push once  insulin lispro (ADMELOG) corrective regimen sliding scale   SubCutaneous every 6 hours  insulin NPH human recombinant 12 Unit(s) SubCutaneous every 6 hours    Neuro:  acetaminophen    Suspension .. 650 milliGRAM(s) Oral every 6 hours PRN Temp greater or equal to 38C (100.4F), Mild Pain (1 - 3)    Cardiac:  doxazosin 2 milliGRAM(s) Oral daily    Pulm:  acetylcysteine 20%  Inhalation 4 milliLiter(s) Inhalation every 6 hours  albuterol/ipratropium for Nebulization 3 milliLiter(s) Nebulizer every 6 hours  sodium chloride 3%  Inhalation 3 milliLiter(s) Inhalation every 6 hours    GI/:  pantoprazole   Suspension 40 milliGRAM(s) Oral daily    Other:   artificial  tears Solution 1 Drop(s) Both EYES every 6 hours  chlorhexidine 0.12% Liquid 15 milliLiter(s) Oral Mucosa two times a day  multivitamin 1 Tablet(s) Oral daily  nystatin Powder 1 Application(s) Topical two times a day    DIET: peg feeds glucerna     IMAGING:

## 2021-12-13 NOTE — PROGRESS NOTE ADULT - SUBJECTIVE AND OBJECTIVE BOX
Chief Complaint:  Patient is a 61y old  Male who presents with a chief complaint of ICH (08 Dec 2021 08:58)      Date of service 12-13-21 @ 12:39      Interval Events:   Patient seen and examined. s/p  shunt. PEG intact; no redness, drainage, bleeding from peg site. Normal BM last night at 10pm. No diarrhea. H/H stable.     Hospital Medications:  acetaminophen    Suspension .. 650 milliGRAM(s) Oral every 6 hours PRN  acetylcysteine 20%  Inhalation 4 milliLiter(s) Inhalation every 6 hours  albuterol/ipratropium for Nebulization 3 milliLiter(s) Nebulizer every 6 hours  apixaban 5 milliGRAM(s) Oral two times a day  artificial  tears Solution 1 Drop(s) Both EYES every 6 hours  atorvastatin 40 milliGRAM(s) Oral at bedtime  chlorhexidine 0.12% Liquid 15 milliLiter(s) Oral Mucosa two times a day  dextrose 50% Injectable 25 Gram(s) IV Push once  dextrose 50% Injectable 12.5 Gram(s) IV Push once  doxazosin 2 milliGRAM(s) Oral daily  insulin lispro (ADMELOG) corrective regimen sliding scale   SubCutaneous every 6 hours  insulin NPH human recombinant 12 Unit(s) SubCutaneous every 6 hours  multivitamin 1 Tablet(s) Oral daily  nystatin Powder 1 Application(s) Topical two times a day  pantoprazole   Suspension 40 milliGRAM(s) Oral daily  sodium chloride 3%  Inhalation 3 milliLiter(s) Inhalation every 6 hours        Review of Systems:  unable to obtain    PHYSICAL EXAM:   Vital Signs:  Vital Signs Last 24 Hrs  T(C): 36.9 (13 Dec 2021 11:07), Max: 37.7 (12 Dec 2021 23:31)  T(F): 98.4 (13 Dec 2021 11:07), Max: 99.9 (13 Dec 2021 04:41)  HR: 103 (13 Dec 2021 11:07) (103 - 109)  BP: 127/83 (13 Dec 2021 11:07) (124/81 - 137/80)  BP(mean): --  RR: 19 (13 Dec 2021 11:07) (18 - 19)  SpO2: 98% (13 Dec 2021 11:07) (94% - 99%)  Daily     Daily       PHYSICAL EXAM:     GENERAL:  Appears stated age, well-groomed, well-nourished, no distress  HEENT:  NC/AT,  conjunctivae anicteric, clear and pink,   NECK: supple, trachea midline, + trach   CHEST:  Full & symmetric excursion, no increased effort, breath sounds clear  HEART:  Regular rhythm, no JVD  ABDOMEN:  Soft, non-tender, non-distended, normoactive bowel sounds,  no masses , no hepatosplenomegaly, +peg   EXTREMITIES:  no cyanosis,clubbing or edema  SKIN:  No rash, erythema, or, ecchymoses, no jaundice  NEURO:  Alert, non-focal, no asterixis  PSYCH: Appropriate affect, oriented to place and time  RECTAL: Deferred      LABS Personally reviewed by me:                        7.9    8.99  )-----------( 443      ( 13 Dec 2021 06:24 )             25.6     Mean Cell Volume: 97.0 fl (12-13-21 @ 06:24)    12-13    147<H>  |  108  |  43<H>  ----------------------------<  165<H>  4.3   |  29  |  0.98    Ca    9.3      13 Dec 2021 06:20                                    7.9    8.99  )-----------( 443      ( 13 Dec 2021 06:24 )             25.6       Imaging personally reviewed by me:

## 2021-12-13 NOTE — PROGRESS NOTE ADULT - SUBJECTIVE AND OBJECTIVE BOX
[facility] Division of Hospital Medicine  Ros Mcgregor MD  spectra [id number]    Patient is a 61y old  Male who presents with a chief complaint of ICH (08 Dec 2021 08:58)      SUBJECTIVE / OVERNIGHT EVENTS: unable to obtain ROS from the patient due to mental status.   ADDITIONAL REVIEW OF SYSTEMS:    MEDICATIONS  (STANDING):  acetylcysteine 20%  Inhalation 4 milliLiter(s) Inhalation every 6 hours  albuterol/ipratropium for Nebulization 3 milliLiter(s) Nebulizer every 6 hours  apixaban 5 milliGRAM(s) Oral two times a day  artificial  tears Solution 1 Drop(s) Both EYES every 6 hours  atorvastatin 40 milliGRAM(s) Oral at bedtime  chlorhexidine 0.12% Liquid 15 milliLiter(s) Oral Mucosa two times a day  dextrose 50% Injectable 25 Gram(s) IV Push once  dextrose 50% Injectable 12.5 Gram(s) IV Push once  doxazosin 2 milliGRAM(s) Oral daily  insulin lispro (ADMELOG) corrective regimen sliding scale   SubCutaneous every 6 hours  insulin NPH human recombinant 12 Unit(s) SubCutaneous every 6 hours  multivitamin 1 Tablet(s) Oral daily  nystatin Powder 1 Application(s) Topical two times a day  pantoprazole   Suspension 40 milliGRAM(s) Oral daily  sodium chloride 3%  Inhalation 3 milliLiter(s) Inhalation every 6 hours    MEDICATIONS  (PRN):  acetaminophen    Suspension .. 650 milliGRAM(s) Oral every 6 hours PRN Temp greater or equal to 38C (100.4F), Mild Pain (1 - 3)      CAPILLARY BLOOD GLUCOSE      POCT Blood Glucose.: 208 mg/dL (13 Dec 2021 13:48)  POCT Blood Glucose.: 166 mg/dL (13 Dec 2021 05:30)  POCT Blood Glucose.: 162 mg/dL (13 Dec 2021 00:12)  POCT Blood Glucose.: 163 mg/dL (12 Dec 2021 17:20)    I&O's Summary    12 Dec 2021 07:01  -  13 Dec 2021 07:00  --------------------------------------------------------  IN: 2920 mL / OUT: 1900 mL / NET: 1020 mL    13 Dec 2021 07:01  -  13 Dec 2021 15:52  --------------------------------------------------------  IN: 600 mL / OUT: 550 mL / NET: 50 mL        PHYSICAL EXAM:  Vital Signs Last 24 Hrs  T(C): 36.9 (13 Dec 2021 15:00), Max: 37.7 (12 Dec 2021 23:31)  T(F): 98.4 (13 Dec 2021 15:00), Max: 99.9 (13 Dec 2021 04:41)  HR: 106 (13 Dec 2021 15:00) (103 - 109)  BP: 134/79 (13 Dec 2021 15:00) (124/81 - 137/80)  BP(mean): --  RR: 19 (13 Dec 2021 15:00) (18 - 19)  SpO2: 100% (13 Dec 2021 15:00) (94% - 100%)    CONSTITUTIONAL: NAD, well-developed, well-groomed  NECK: + trach collar  RESPIRATORY: Normal respiratory effort; lungs are clear to auscultation bilaterally  CARDIOVASCULAR: normal S1 and S2, no murmur/rub/gallop; No lower extremity edema  ABDOMEN: Nontender to palpation, normoactive bowel sounds, no rebound/guarding; + PEG  MUSCULOSKELETAL:  no clubbing or cyanosis of digits; no joint swelling or tenderness to palpation  PSYCH: Alert and awake; flat affect  NEURO: doesn't follow commands  SKIN: No rashes; no palpable lesions    LABS:                        7.9    8.99  )-----------( 443      ( 13 Dec 2021 06:24 )             25.6     12-13    147<H>  |  108  |  43<H>  ----------------------------<  165<H>  4.3   |  29  |  0.98    Ca    9.3      13 Dec 2021 06:20                  RADIOLOGY & ADDITIONAL TESTS:  Results Reviewed:     < from: CT Head No Cont (12.12.21 @ 15:38) >  IMPRESSION:    Similar ventricular size when compared to 12/9/2021.    < end of copied text >    Imaging Personally Reviewed:  Electrocardiogram Personally Reviewed:    COORDINATION OF CARE:  Care Discussed with Consultants/Other Providers [Y/N]: neurosurgery PA(Abigail)  Prior or Outpatient Records Reviewed [Y/N]:

## 2021-12-13 NOTE — PROGRESS NOTE ADULT - ATTENDING COMMENTS
Events that transpired overnight were gone over.  Agree with physical examination.  Continue Eliquis 5mg PO BID.   Monitor closely for signs and symptoms of bleeding.

## 2021-12-13 NOTE — PROGRESS NOTE ADULT - PROBLEM SELECTOR PLAN 2
found to be in urinary retention requiring intermittent cath  MICHELLE resolved  monitor urine output, renal function  continue doxazosin

## 2021-12-13 NOTE — PROGRESS NOTE ADULT - ASSESSMENT
61 year old man with respiratory failure d/t ICH    1. ICH  -per neurosurgery,  -s/p  shunt    2. Respiratory failure  -for tracheostomy, will require long term enteral nutrition  -s/p PEG, tolerating feeds  - aspiration precautions     3. Enterobacter PNA  -s/p course of antibiotics     4. afib with RVR    5. Anemia, no overt GI bleed. EGD unremarkable.   -h/h stabilizing  -trend CBC  -continue PPI  -monitor for GI bleeding     6. Cdiff infection   -s/p abx course  -PO vanco discontinued   -diarrhea resolved    7. DVT on a/c  -apixaban 5mg resumed  -continue PPI     Attending supervision statement: I have personally seen and examined the patient. I fully participated in the care of this patient. I have made amendments to the documentation where necessary, and agree with the history, physical exam, and plan as outlined by the ACP.      [facility]  Gastroenterology and Hepatology  [address] [place], [place]  Office: [phone]  Cell: [phone]   61 year old man with respiratory failure d/t ICH    1. ICH  -per neurosurgery,  -s/p  shunt    2. Respiratory failure  -for tracheostomy, will require long term enteral nutrition  -s/p PEG, tolerating feeds  - aspiration precautions     3. Enterobacter PNA  -s/p course of antibiotics     4. afib with RVR    5. Anemia, no overt GI bleed. EGD unremarkable.   -h/h stabilizing  -trend CBC  -continue PPI  -monitor for GI bleeding; Eliquis resumed for DVTs    6. Cdiff infection   -s/p abx course  -PO vanco discontinued   -diarrhea resolved    7. DVT on a/c  -apixaban 5mg resumed  -continue PPI     Attending supervision statement: I have personally seen and examined the patient. I fully participated in the care of this patient. I have made amendments to the documentation where necessary, and agree with the history, physical exam, and plan as outlined by the ACP.      [facility]  Gastroenterology and Hepatology  [address] [place], [place]  Office: [phone]  Cell: [phone]

## 2021-12-13 NOTE — PROGRESS NOTE ADULT - ASSESSMENT
61M with PMH of HTN, CAD s/p stent on DAPT, T2DM who complained of headache and subsequently became unresponsive found to have posterior fossa hemorrhage, IVH, hydrocephalus, s/p EVD and SOC on 11/4. 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 Course c/b respiratory failure and dysphagia s/p trach/PEG (11/19), VPS (11/23), completed course of cefepime for enterobacter and pseudomonas pneumonia on 11/21, fevers, c. diff colitis, urinary retention, b/l distal DVT.

## 2021-12-13 NOTE — PROGRESS NOTE ADULT - SUBJECTIVE AND OBJECTIVE BOX
Vascular Cardiology  Progress note  EMAIL [email or web address]     OFFICE [phone]    INTERVAL HISTORY:  -No acute events overnight.     Allergies  penicillin (Other)      MEDICATIONS:  apixaban 5 milliGRAM(s) Oral two times a day  doxazosin 2 milliGRAM(s) Oral daily  acetylcysteine 20%  Inhalation 4 milliLiter(s) Inhalation every 6 hours  albuterol/ipratropium for Nebulization 3 milliLiter(s) Nebulizer every 6 hours  sodium chloride 3%  Inhalation 3 milliLiter(s) Inhalation every 6 hours  acetaminophen    Suspension .. 650 milliGRAM(s) Oral every 6 hours PRN  pantoprazole   Suspension 40 milliGRAM(s) Oral daily  atorvastatin 40 milliGRAM(s) Oral at bedtime  dextrose 50% Injectable 25 Gram(s) IV Push once  dextrose 50% Injectable 12.5 Gram(s) IV Push once  insulin lispro (ADMELOG) corrective regimen sliding scale   SubCutaneous every 6 hours  insulin NPH human recombinant 12 Unit(s) SubCutaneous every 6 hours  artificial  tears Solution 1 Drop(s) Both EYES every 6 hours  chlorhexidine 0.12% Liquid 15 milliLiter(s) Oral Mucosa two times a day  multivitamin 1 Tablet(s) Oral daily  nystatin Powder 1 Application(s) Topical two times a day    PAST MEDICAL & SURGICAL HISTORY:  HTN (hypertension)  Diabetes mellitus    FAMILY HISTORY:    SOCIAL HISTORY:  unchanged    REVIEW OF SYSTEMS:  [x ] Unable to obtain    PHYSICAL EXAM:  T(C): 36.9 (12-13-21 @ 11:07), Max: 37.7 (12-12-21 @ 23:31)  HR: 103 (12-13-21 @ 11:07) (103 - 109)  BP: 127/83 (12-13-21 @ 11:07) (124/81 - 137/80)  RR: 19 (12-13-21 @ 11:07) (18 - 19)  SpO2: 98% (12-13-21 @ 11:07) (94% - 99%)  Wt(kg): --  I&O's Summary    12 Dec 2021 07:01  -  13 Dec 2021 07:00  --------------------------------------------------------  IN: 2920 mL / OUT: 1900 mL / NET: 1020 mL    Appearance: Frail appearing, nonverbal, with trach and PEG  HEENT:   Normal oral mucosa, PERRL, EOMI	  Lymphatic: No lymphadenopathy  Cardiovascular:  S1, S2, RRR, no RMG  Respiratory:  CTA b/l  Psychiatry:  Nonverbal  Gastrointestinal:  Soft, Non-tender, + BS	  Skin: No rashes, No ecchymoses, No cyanosis	  Extremities:  B/l LE with no edema, asymmetry of girth, skin changes; extremities are warm with no ulcers or wounds, no phlegmasia    LABS:	 	    CBC Full  -  ( 13 Dec 2021 06:24 )  WBC Count : 8.99 K/uL  Hemoglobin : 7.9 g/dL  Hematocrit : 25.6 %  Platelet Count - Automated : 443 K/uL  Mean Cell Volume : 97.0 fl  Mean Cell Hemoglobin : 29.9 pg  Mean Cell Hemoglobin Concentration : 30.9 gm/dL  Auto Neutrophil # : x  Auto Lymphocyte # : x  Auto Monocyte # : x  Auto Eosinophil # : x  Auto Basophil # : x  Auto Neutrophil % : x  Auto Lymphocyte % : x  Auto Monocyte % : x  Auto Eosinophil % : x  Auto Basophil % : x    12-13    147<H>  |  108  |  43<H>  ----------------------------<  165<H>  4.3   |  29  |  0.98    Ca    9.3      13 Dec 2021 06:20    Assessment:  1. IVH  2. C. difficile diarrhea  3. Gram-negative pneumonia.   4. Respiratory failure.   5. B/l below knee DVTs  8. CAD     Plan:  1. Eliquis 5mg PO BID resumed for b/l below knee DVTs.  3. Continue excellent NSICU care.    Thank you,    Please call with any questions:   Service Line: [phone]  Office [phone]  email:  [email or web address]

## 2021-12-13 NOTE — PROGRESS NOTE ADULT - PROBLEM SELECTOR PLAN 7
s/p stent. was on DAPT per prescription refills   - resume ASA when clear from neurosurgery standpoint  - c/w statin  - patient was on metoprolol ER 25mg at home (will start metoprolol 12.5mg BID)  - GI noting afib with RVR?? but no other documentation in chart found supporting that and patient seems to be in sinus rhythm s/p stent. was on DAPT per prescription refills   - resume ASA when clear from neurosurgery standpoint  - c/w statin (check repeat LFT to follow up on transaminitis)  - patient was on metoprolol ER 25mg at home (will start metoprolol 12.5mg BID)  - GI noting afib with RVR?? but no other documentation in chart found supporting that and patient seems to be in sinus rhythm

## 2021-12-14 LAB
ALBUMIN SERPL ELPH-MCNC: 3.2 G/DL — LOW (ref 3.3–5)
ALP SERPL-CCNC: 113 U/L — SIGNIFICANT CHANGE UP (ref 40–120)
ALT FLD-CCNC: 24 U/L — SIGNIFICANT CHANGE UP (ref 10–45)
ANION GAP SERPL CALC-SCNC: 12 MMOL/L — SIGNIFICANT CHANGE UP (ref 5–17)
APPEARANCE UR: ABNORMAL
AST SERPL-CCNC: 20 U/L — SIGNIFICANT CHANGE UP (ref 10–40)
BACTERIA # UR AUTO: NEGATIVE — SIGNIFICANT CHANGE UP
BILIRUB SERPL-MCNC: 0.2 MG/DL — SIGNIFICANT CHANGE UP (ref 0.2–1.2)
BILIRUB UR-MCNC: NEGATIVE — SIGNIFICANT CHANGE UP
BLD GP AB SCN SERPL QL: NEGATIVE — SIGNIFICANT CHANGE UP
BUN SERPL-MCNC: 46 MG/DL — HIGH (ref 7–23)
CALCIUM SERPL-MCNC: 9.3 MG/DL — SIGNIFICANT CHANGE UP (ref 8.4–10.5)
CHLORIDE SERPL-SCNC: 109 MMOL/L — HIGH (ref 96–108)
CO2 SERPL-SCNC: 30 MMOL/L — SIGNIFICANT CHANGE UP (ref 22–31)
COLOR SPEC: YELLOW — SIGNIFICANT CHANGE UP
CREAT SERPL-MCNC: 0.98 MG/DL — SIGNIFICANT CHANGE UP (ref 0.5–1.3)
DIFF PNL FLD: ABNORMAL
EPI CELLS # UR: 2 /HPF — SIGNIFICANT CHANGE UP
GLUCOSE SERPL-MCNC: 176 MG/DL — HIGH (ref 70–99)
GLUCOSE UR QL: NEGATIVE — SIGNIFICANT CHANGE UP
GRAM STN FLD: SIGNIFICANT CHANGE UP
HCT VFR BLD CALC: 26.9 % — LOW (ref 39–50)
HGB BLD-MCNC: 8.2 G/DL — LOW (ref 13–17)
HYALINE CASTS # UR AUTO: 4 /LPF — HIGH (ref 0–2)
KETONES UR-MCNC: NEGATIVE — SIGNIFICANT CHANGE UP
LEUKOCYTE ESTERASE UR-ACNC: NEGATIVE — SIGNIFICANT CHANGE UP
MCHC RBC-ENTMCNC: 29.6 PG — SIGNIFICANT CHANGE UP (ref 27–34)
MCHC RBC-ENTMCNC: 30.5 GM/DL — LOW (ref 32–36)
MCV RBC AUTO: 97.1 FL — SIGNIFICANT CHANGE UP (ref 80–100)
NITRITE UR-MCNC: NEGATIVE — SIGNIFICANT CHANGE UP
NRBC # BLD: 0 /100 WBCS — SIGNIFICANT CHANGE UP (ref 0–0)
PH UR: 6 — SIGNIFICANT CHANGE UP (ref 5–8)
PLATELET # BLD AUTO: 419 K/UL — HIGH (ref 150–400)
POTASSIUM SERPL-MCNC: 4.1 MMOL/L — SIGNIFICANT CHANGE UP (ref 3.5–5.3)
POTASSIUM SERPL-SCNC: 4.1 MMOL/L — SIGNIFICANT CHANGE UP (ref 3.5–5.3)
PROT SERPL-MCNC: 6.3 G/DL — SIGNIFICANT CHANGE UP (ref 6–8.3)
PROT UR-MCNC: ABNORMAL
RAPID RVP RESULT: SIGNIFICANT CHANGE UP
RBC # BLD: 2.77 M/UL — LOW (ref 4.2–5.8)
RBC # FLD: 13.7 % — SIGNIFICANT CHANGE UP (ref 10.3–14.5)
RBC CASTS # UR COMP ASSIST: 10 /HPF — HIGH (ref 0–4)
RH IG SCN BLD-IMP: POSITIVE — SIGNIFICANT CHANGE UP
SARS-COV-2 RNA SPEC QL NAA+PROBE: SIGNIFICANT CHANGE UP
SODIUM SERPL-SCNC: 151 MMOL/L — HIGH (ref 135–145)
SP GR SPEC: 1.02 — SIGNIFICANT CHANGE UP (ref 1.01–1.02)
SPECIMEN SOURCE: SIGNIFICANT CHANGE UP
UROBILINOGEN FLD QL: NEGATIVE — SIGNIFICANT CHANGE UP
WBC # BLD: 13.14 K/UL — HIGH (ref 3.8–10.5)
WBC # FLD AUTO: 13.14 K/UL — HIGH (ref 3.8–10.5)
WBC UR QL: 3 /HPF — SIGNIFICANT CHANGE UP (ref 0–5)

## 2021-12-14 PROCEDURE — 99232 SBSQ HOSP IP/OBS MODERATE 35: CPT

## 2021-12-14 PROCEDURE — 99233 SBSQ HOSP IP/OBS HIGH 50: CPT

## 2021-12-14 RX ADMIN — Medication 4 MILLILITER(S): at 05:43

## 2021-12-14 RX ADMIN — APIXABAN 5 MILLIGRAM(S): 2.5 TABLET, FILM COATED ORAL at 18:03

## 2021-12-14 RX ADMIN — ATORVASTATIN CALCIUM 40 MILLIGRAM(S): 80 TABLET, FILM COATED ORAL at 21:48

## 2021-12-14 RX ADMIN — Medication 1 DROP(S): at 18:01

## 2021-12-14 RX ADMIN — PANTOPRAZOLE SODIUM 40 MILLIGRAM(S): 20 TABLET, DELAYED RELEASE ORAL at 11:51

## 2021-12-14 RX ADMIN — HUMAN INSULIN 12 UNIT(S): 100 INJECTION, SUSPENSION SUBCUTANEOUS at 05:45

## 2021-12-14 RX ADMIN — NYSTATIN CREAM 1 APPLICATION(S): 100000 CREAM TOPICAL at 18:03

## 2021-12-14 RX ADMIN — HUMAN INSULIN 12 UNIT(S): 100 INJECTION, SUSPENSION SUBCUTANEOUS at 18:01

## 2021-12-14 RX ADMIN — HUMAN INSULIN 12 UNIT(S): 100 INJECTION, SUSPENSION SUBCUTANEOUS at 11:50

## 2021-12-14 RX ADMIN — Medication 2 MILLIGRAM(S): at 05:47

## 2021-12-14 RX ADMIN — APIXABAN 5 MILLIGRAM(S): 2.5 TABLET, FILM COATED ORAL at 05:47

## 2021-12-14 RX ADMIN — Medication 4: at 11:49

## 2021-12-14 RX ADMIN — Medication 1 DROP(S): at 05:46

## 2021-12-14 RX ADMIN — Medication 650 MILLIGRAM(S): at 06:17

## 2021-12-14 RX ADMIN — Medication 3 MILLILITER(S): at 18:02

## 2021-12-14 RX ADMIN — Medication 12.5 MILLIGRAM(S): at 05:51

## 2021-12-14 RX ADMIN — Medication 12.5 MILLIGRAM(S): at 18:04

## 2021-12-14 RX ADMIN — Medication 2: at 18:02

## 2021-12-14 RX ADMIN — Medication 4 MILLILITER(S): at 18:02

## 2021-12-14 RX ADMIN — CHLORHEXIDINE GLUCONATE 15 MILLILITER(S): 213 SOLUTION TOPICAL at 05:46

## 2021-12-14 RX ADMIN — Medication 1 TABLET(S): at 11:51

## 2021-12-14 RX ADMIN — Medication 4 MILLILITER(S): at 11:50

## 2021-12-14 RX ADMIN — Medication 2: at 05:45

## 2021-12-14 RX ADMIN — CHLORHEXIDINE GLUCONATE 15 MILLILITER(S): 213 SOLUTION TOPICAL at 18:03

## 2021-12-14 RX ADMIN — NYSTATIN CREAM 1 APPLICATION(S): 100000 CREAM TOPICAL at 05:43

## 2021-12-14 RX ADMIN — Medication 3 MILLILITER(S): at 11:50

## 2021-12-14 RX ADMIN — Medication 1 DROP(S): at 11:48

## 2021-12-14 RX ADMIN — SODIUM CHLORIDE 3 MILLILITER(S): 9 INJECTION INTRAMUSCULAR; INTRAVENOUS; SUBCUTANEOUS at 05:43

## 2021-12-14 RX ADMIN — Medication 3 MILLILITER(S): at 05:51

## 2021-12-14 NOTE — PROGRESS NOTE ADULT - ASSESSMENT
61M hx HTN, DM, cardiac stent on ASA. At work complaining of HA ~8:30, starting feeling dizzy and vomiting, HTN/keke when EMS got to him. SBP 220s, HR 50s.     On EMS arrival at 09:39AM 11/4/21, patient seen talking a little, garbling, moving all extremities, then quickly became unresponsive. No known blood thinners.    CTH shows large posterior fossa bleed w/ IVH/SAH/hydro.    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, s/p crani for PICA aneurysm resection on 11/10/2021, s/p trach/PEG on 11/19/21, s/p insertion of right parietal ventriculoperitoneal shunt (Certas @ 4) on 11/23/2021 11/22 transferred to ICU for lethargy and pseudomeningocele and shunt malfunction  Had a pseudomengiocele tap   11/23 cps revision certa @ 5       PLAN:  Neuro:   -q4 hour neuro checks  -tylenol for pain control and fevers  -out of bed with assistance  -pt/ot - subacute rehab upon discharge  -VPS Certas @4  Respiratory:   - on trach collar  - suction prn- moderate amount of secretions  - continue 3% sodium chloride inhalationCV:  -keep sbp 100-140  -atorvastatin for hyperlipidemia   Endocrine:    -nph and JOSE MARTIN for glucose control  -keep fingersticks 100-180  DVT ppx:   - hep sq  - persistent bilateral below knee dvt on eliquis 5 bid   ID:   -febrile last night.  follow up cultures including rvp   -po vanco for c. diff course completed   -ID following  - Gram-negative pneumonia- sputum culture grew enterobacter and most recently pseudomonas.  GI:    - tube feeds  PT/OT:   - CHUYITA 61M hx HTN, DM, cardiac stent on ASA. At work complaining of HA ~8:30, starting feeling dizzy and vomiting, HTN/keke when EMS got to him. SBP 220s, HR 50s.     On EMS arrival at 09:39AM 11/4/21, patient seen talking a little, garbling, moving all extremities, then quickly became unresponsive. No known blood thinners.    CTH shows large posterior fossa bleed w/ IVH/SAH/hydro.    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, s/p crani for PICA aneurysm resection on 11/10/2021, s/p trach/PEG on 11/19/21, s/p insertion of right parietal ventriculoperitoneal shunt (Certas @ 4) on 11/23/2021 11/22 transferred to ICU for lethargy and pseudomeningocele and shunt malfunction  Had a pseudomengiocele tap   11/23 cps revision certa @ 5       PLAN:  Neuro:   -q4 hour neuro checks  -tylenol for pain control and fevers  -out of bed with assistance  -pt/ot - subacute rehab upon discharge  -VPS Certas @4  -staples removed   Respiratory:   - on trach collar  - suction prn- moderate amount of secretions  - continue 3% sodium chloride inhalationCV:  -keep sbp 100-140  -atorvastatin for hyperlipidemia   Endocrine:    -nph and JOSE MARTIN for glucose control  -keep fingersticks 100-180  DVT ppx:   - hep sq  - persistent bilateral below knee dvt on eliquis 5 bid   ID:   -febrile last night.  follow up cultures including rvp   -po vanco for c. diff course completed   -ID following  - Gram-negative pneumonia- sputum culture grew enterobacter and most recently pseudomonas.  GI:    - tube feeds  Renal   - NA trending up, increase free water to 300 q 4 and monitor   PT/OT:   - CHUYITA

## 2021-12-14 NOTE — PROGRESS NOTE ADULT - SUBJECTIVE AND OBJECTIVE BOX
[facility] Division of Hospital Medicine  Ros Mcgregor MD  spectra [id number]    Patient is a 61y old  Male who presents with a chief complaint of ICH (08 Dec 2021 08:58)      SUBJECTIVE / OVERNIGHT EVENTS: patient with fevers overnight. unable to obtain ROS from the patient due to mental status.  ADDITIONAL REVIEW OF SYSTEMS:    MEDICATIONS  (STANDING):  acetaminophen   IVPB .. 1000 milliGRAM(s) IV Intermittent once  acetylcysteine 20%  Inhalation 4 milliLiter(s) Inhalation every 6 hours  albuterol/ipratropium for Nebulization 3 milliLiter(s) Nebulizer every 6 hours  apixaban 5 milliGRAM(s) Oral two times a day  artificial  tears Solution 1 Drop(s) Both EYES every 6 hours  atorvastatin 40 milliGRAM(s) Oral at bedtime  chlorhexidine 0.12% Liquid 15 milliLiter(s) Oral Mucosa two times a day  dextrose 50% Injectable 25 Gram(s) IV Push once  dextrose 50% Injectable 12.5 Gram(s) IV Push once  doxazosin 2 milliGRAM(s) Oral daily  insulin lispro (ADMELOG) corrective regimen sliding scale   SubCutaneous every 6 hours  insulin NPH human recombinant 12 Unit(s) SubCutaneous every 6 hours  metoprolol tartrate 12.5 milliGRAM(s) Oral two times a day  multivitamin 1 Tablet(s) Oral daily  nystatin Powder 1 Application(s) Topical two times a day  pantoprazole   Suspension 40 milliGRAM(s) Oral daily  sodium chloride 0.9% Bolus 500 milliLiter(s) IV Bolus once  sodium chloride 3%  Inhalation 3 milliLiter(s) Inhalation every 6 hours    MEDICATIONS  (PRN):  acetaminophen    Suspension .. 650 milliGRAM(s) Oral every 6 hours PRN Temp greater or equal to 38C (100.4F), Mild Pain (1 - 3)      CAPILLARY BLOOD GLUCOSE      POCT Blood Glucose.: 204 mg/dL (14 Dec 2021 11:29)  POCT Blood Glucose.: 182 mg/dL (14 Dec 2021 05:31)  POCT Blood Glucose.: 181 mg/dL (13 Dec 2021 23:37)  POCT Blood Glucose.: 195 mg/dL (13 Dec 2021 18:00)  POCT Blood Glucose.: 208 mg/dL (13 Dec 2021 13:48)    I&O's Summary    13 Dec 2021 07:01  -  14 Dec 2021 07:00  --------------------------------------------------------  IN: 900 mL / OUT: 1200 mL / NET: -300 mL        PHYSICAL EXAM:  Vital Signs Last 24 Hrs  T(C): 37.2 (14 Dec 2021 12:44), Max: 38.7 (13 Dec 2021 20:56)  T(F): 99 (14 Dec 2021 12:44), Max: 101.7 (13 Dec 2021 20:56)  HR: 112 (14 Dec 2021 12:44) (101 - 112)  BP: 116/77 (14 Dec 2021 12:44) (116/77 - 134/79)  BP(mean): --  RR: 20 (14 Dec 2021 12:44) (18 - 20)  SpO2: 96% (14 Dec 2021 12:44) (96% - 100%)    CONSTITUTIONAL: NAD, well-groomed  NECK: + trach collar  RESPIRATORY: Normal respiratory effort; lungs are clear to auscultation bilaterally  CARDIOVASCULAR: normal S1 and S2, no murmur/rub/gallop; No lower extremity edema  ABDOMEN: Nontender to palpation, normoactive bowel sounds, no rebound/guarding; + PEG  MUSCULOSKELETAL:  no clubbing or cyanosis of digits; no joint swelling or tenderness to palpation  PSYCH: Alert and awake; flat affect  NEURO: doesn't follow commands  SKIN: No rashes; no palpable lesions      LABS:                        8.2    13.14 )-----------( 419      ( 14 Dec 2021 06:01 )             26.9     12-14    151<H>  |  109<H>  |  46<H>  ----------------------------<  176<H>  4.1   |  30  |  0.98    Ca    9.3      14 Dec 2021 06:01    TPro  6.3  /  Alb  3.2<L>  /  TBili  0.2  /  DBili  x   /  AST  20  /  ALT  24  /  AlkPhos  113  12-14          Urinalysis Basic - ( 13 Dec 2021 23:33 )    Color: Yellow / Appearance: Slightly Turbid / S.021 / pH: x  Gluc: x / Ketone: Negative  / Bili: Negative / Urobili: Negative   Blood: x / Protein: 30 mg/dL / Nitrite: Negative   Leuk Esterase: Negative / RBC: 10 /hpf / WBC 3 /HPF   Sq Epi: x / Non Sq Epi: 2 /hpf / Bacteria: Negative          RADIOLOGY & ADDITIONAL TESTS:  Results Reviewed:     Imaging Personally Reviewed: CXR- no focal infiltrate  Electrocardiogram Personally Reviewed:    COORDINATION OF CARE:  Care Discussed with Consultants/Other Providers [Y/N]: neurosurgery PA(Abigail)  Prior or Outpatient Records Reviewed [Y/N]:

## 2021-12-14 NOTE — PROGRESS NOTE ADULT - SUBJECTIVE AND OBJECTIVE BOX
SUBJECTIVE: Patient seen and examined.  Exam stable.  Febrile overnight      Vital Signs Last 24 Hrs  T(C): 37.9 (14 Dec 2021 08:00), Max: 38.7 (13 Dec 2021 20:56)  T(F): 100.2 (14 Dec 2021 08:00), Max: 101.7 (13 Dec 2021 20:56)  HR: 105 (14 Dec 2021 08:00) (101 - 106)  BP: 123/80 (14 Dec 2021 08:00) (123/80 - 134/79)  BP(mean): --  RR: 20 (14 Dec 2021 08:00) (18 - 20)  SpO2: 99% (14 Dec 2021 08:00) (97% - 100%)    PHYSICAL EXAM:    Constitutional: No Acute Distress     Neurological: opens eyes, no following commands, localizes his uppers and wds lowers.    Pulmonary: Clear to Auscultation, No rales, No rhonchi, No wheezes     Cardiovascular: S1, S2, Regular rate and rhythm     Gastrointestinal: Soft, Non-tender, Non-distended     Extremities: No calf tenderness     Incision: c/d/i no pseudomengiocele   LABS:                        8.2    13.14 )-----------( 419      ( 14 Dec 2021 06:01 )             26.9    12-14    151<H>  |  109<H>  |  46<H>  ----------------------------<  176<H>  4.1   |  30  |  0.98    Ca    9.3      14 Dec 2021 06:01    TPro  6.3  /  Alb  3.2<L>  /  TBili  0.2  /  DBili  x   /  AST  20  /  ALT  24  /  AlkPhos  113  12-14 12-13 @ 07:01  -  12-14 @ 07:00  --------------------------------------------------------  IN: 900 mL / OUT: 1200 mL / NET: -300 mL      DRAINS:     MEDICATIONS:  Anticoagulation:   apixaban 5 milliGRAM(s) Oral two times a day    Antibiotics:    Endo:  atorvastatin 40 milliGRAM(s) Oral at bedtime  dextrose 50% Injectable 25 Gram(s) IV Push once  dextrose 50% Injectable 12.5 Gram(s) IV Push once  insulin lispro (ADMELOG) corrective regimen sliding scale   SubCutaneous every 6 hours  insulin NPH human recombinant 12 Unit(s) SubCutaneous every 6 hours    Neuro:  acetaminophen    Suspension .. 650 milliGRAM(s) Oral every 6 hours PRN Temp greater or equal to 38C (100.4F), Mild Pain (1 - 3)  acetaminophen   IVPB .. 1000 milliGRAM(s) IV Intermittent once    Cardiac:  doxazosin 2 milliGRAM(s) Oral daily  metoprolol tartrate 12.5 milliGRAM(s) Oral two times a day    Pulm:  acetylcysteine 20%  Inhalation 4 milliLiter(s) Inhalation every 6 hours  albuterol/ipratropium for Nebulization 3 milliLiter(s) Nebulizer every 6 hours  sodium chloride 3%  Inhalation 3 milliLiter(s) Inhalation every 6 hours    GI/:  pantoprazole   Suspension 40 milliGRAM(s) Oral daily    Other:   artificial  tears Solution 1 Drop(s) Both EYES every 6 hours  chlorhexidine 0.12% Liquid 15 milliLiter(s) Oral Mucosa two times a day  multivitamin 1 Tablet(s) Oral daily  nystatin Powder 1 Application(s) Topical two times a day  sodium chloride 0.9% Bolus 500 milliLiter(s) IV Bolus once    DIET: glucerna @ 75

## 2021-12-14 NOTE — PROGRESS NOTE ADULT - PROBLEM SELECTOR PLAN 1
afebrile since 12/6 but recurrent fevers 12/13  Blood/urine/CSF cultures from 12/6-7 negative to date  repeat UA negative  CXR reviewed- no focal infiltrate  COVID PCR neg  f/u RVP, sputum cx  f/u repeat Bcx  consider CT c/a/p for further evaluation  ID follow up

## 2021-12-14 NOTE — PROGRESS NOTE ADULT - SUBJECTIVE AND OBJECTIVE BOX
Vascular Cardiology  Progress note  EMAIL [email or web address]     OFFICE [phone]    INTERVAL HISTORY:  -Received 1 unit pRBCs last night for Hb drop to 6.9; now 8.2 this AM.     Allergies  penicillin (Other)    MEDICATIONS:  apixaban 5 milliGRAM(s) Oral two times a day  doxazosin 2 milliGRAM(s) Oral daily  metoprolol tartrate 12.5 milliGRAM(s) Oral two times a day  acetylcysteine 20%  Inhalation 4 milliLiter(s) Inhalation every 6 hours  albuterol/ipratropium for Nebulization 3 milliLiter(s) Nebulizer every 6 hours  sodium chloride 3%  Inhalation 3 milliLiter(s) Inhalation every 6 hours  acetaminophen    Suspension .. 650 milliGRAM(s) Oral every 6 hours PRN  acetaminophen   IVPB .. 1000 milliGRAM(s) IV Intermittent once  pantoprazole   Suspension 40 milliGRAM(s) Oral daily  atorvastatin 40 milliGRAM(s) Oral at bedtime  dextrose 50% Injectable 25 Gram(s) IV Push once  dextrose 50% Injectable 12.5 Gram(s) IV Push once  insulin lispro (ADMELOG) corrective regimen sliding scale   SubCutaneous every 6 hours  insulin NPH human recombinant 12 Unit(s) SubCutaneous every 6 hours  artificial  tears Solution 1 Drop(s) Both EYES every 6 hours  chlorhexidine 0.12% Liquid 15 milliLiter(s) Oral Mucosa two times a day  multivitamin 1 Tablet(s) Oral daily  nystatin Powder 1 Application(s) Topical two times a day  sodium chloride 0.9% Bolus 500 milliLiter(s) IV Bolus once    PAST MEDICAL & SURGICAL HISTORY:  HTN (hypertension)  Diabetes mellitus    SOCIAL HISTORY:  unchanged    REVIEW OF SYSTEMS:  [x ] Unable to obtain    PHYSICAL EXAM:  T(C): 37.9 (12-14-21 @ 08:00), Max: 38.7 (12-13-21 @ 20:56)  HR: 105 (12-14-21 @ 08:00) (101 - 106)  BP: 123/80 (12-14-21 @ 08:00) (123/80 - 134/79)  RR: 20 (12-14-21 @ 08:00) (18 - 20)  SpO2: 99% (12-14-21 @ 08:00) (97% - 100%)  Wt(kg): --  I&O's Summary    13 Dec 2021 07:01  -  14 Dec 2021 07:00  --------------------------------------------------------  IN: 900 mL / OUT: 1200 mL / NET: -300 mL    Appearance: Frail appearing, nonverbal, with trach and PEG  HEENT:   Normal oral mucosa, PERRL, EOMI	  Lymphatic: No lymphadenopathy  Cardiovascular:  S1, S2, RRR, no RMG  Respiratory:  CTA b/l  Psychiatry:  Nonverbal  Gastrointestinal:  Soft, Non-tender, + BS	  Skin: No rashes, No ecchymoses, No cyanosis	  Extremities:  B/l LE with no edema, asymmetry of girth, skin changes; extremities are warm with no ulcers or wounds, no phlegmasia    LABS:	 	    CBC Full  -  ( 14 Dec 2021 06:01 )  WBC Count : 13.14 K/uL  Hemoglobin : 8.2 g/dL  Hematocrit : 26.9 %  Platelet Count - Automated : 419 K/uL  Mean Cell Volume : 97.1 fl  Mean Cell Hemoglobin : 29.6 pg  Mean Cell Hemoglobin Concentration : 30.5 gm/dL  Auto Neutrophil # : x  Auto Lymphocyte # : x  Auto Monocyte # : x  Auto Eosinophil # : x  Auto Basophil # : x  Auto Neutrophil % : x  Auto Lymphocyte % : x  Auto Monocyte % : x  Auto Eosinophil % : x  Auto Basophil % : x    12-14    151<H>  |  109<H>  |  46<H>  ----------------------------<  176<H>  4.1   |  30  |  0.98  12-13    150<H>  |  108  |  46<H>  ----------------------------<  156<H>  4.2   |  31  |  0.98    Ca    9.3      14 Dec 2021 06:01  Ca    9.4      13 Dec 2021 22:38    TPro  6.3  /  Alb  3.2<L>  /  TBili  0.2  /  DBili  x   /  AST  20  /  ALT  24  /  AlkPhos  113  12-14      Assessment:  1. IVH  2. C. difficile diarrhea  3. Gram-negative pneumonia.   4. Respiratory failure.   5. B/l below knee DVTs  8. CAD     Plan:  1. Eliquis 5mg PO BID resumed for b/l below knee DVTs.  2. Monitor H/H and transfuse for Hb<7.  3. Continue excellent NSICU care.    Thank you,    Please call with any questions:   Service Line: [phone]  Office [phone]  email:  [email or web address]       Vascular Cardiology  Progress note  EMAIL [email or web address]     OFFICE [phone]    INTERVAL HISTORY:  -Received 1 unit pRBCs last night for Hb drop to 6.9; now 8.2 this AM.  -Febrile this AM.     Allergies  penicillin (Other)    MEDICATIONS:  apixaban 5 milliGRAM(s) Oral two times a day  doxazosin 2 milliGRAM(s) Oral daily  metoprolol tartrate 12.5 milliGRAM(s) Oral two times a day  acetylcysteine 20%  Inhalation 4 milliLiter(s) Inhalation every 6 hours  albuterol/ipratropium for Nebulization 3 milliLiter(s) Nebulizer every 6 hours  sodium chloride 3%  Inhalation 3 milliLiter(s) Inhalation every 6 hours  acetaminophen    Suspension .. 650 milliGRAM(s) Oral every 6 hours PRN  acetaminophen   IVPB .. 1000 milliGRAM(s) IV Intermittent once  pantoprazole   Suspension 40 milliGRAM(s) Oral daily  atorvastatin 40 milliGRAM(s) Oral at bedtime  dextrose 50% Injectable 25 Gram(s) IV Push once  dextrose 50% Injectable 12.5 Gram(s) IV Push once  insulin lispro (ADMELOG) corrective regimen sliding scale   SubCutaneous every 6 hours  insulin NPH human recombinant 12 Unit(s) SubCutaneous every 6 hours  artificial  tears Solution 1 Drop(s) Both EYES every 6 hours  chlorhexidine 0.12% Liquid 15 milliLiter(s) Oral Mucosa two times a day  multivitamin 1 Tablet(s) Oral daily  nystatin Powder 1 Application(s) Topical two times a day  sodium chloride 0.9% Bolus 500 milliLiter(s) IV Bolus once    PAST MEDICAL & SURGICAL HISTORY:  HTN (hypertension)  Diabetes mellitus    SOCIAL HISTORY:  unchanged    REVIEW OF SYSTEMS:  [x ] Unable to obtain    PHYSICAL EXAM:  T(C): 37.9 (12-14-21 @ 08:00), Max: 38.7 (12-13-21 @ 20:56)  HR: 105 (12-14-21 @ 08:00) (101 - 106)  BP: 123/80 (12-14-21 @ 08:00) (123/80 - 134/79)  RR: 20 (12-14-21 @ 08:00) (18 - 20)  SpO2: 99% (12-14-21 @ 08:00) (97% - 100%)  Wt(kg): --  I&O's Summary    13 Dec 2021 07:01  -  14 Dec 2021 07:00  --------------------------------------------------------  IN: 900 mL / OUT: 1200 mL / NET: -300 mL    Appearance: Frail appearing, nonverbal, with trach and PEG  HEENT:   Normal oral mucosa, PERRL, EOMI	  Lymphatic: No lymphadenopathy  Cardiovascular:  S1, S2, RRR, no RMG  Respiratory:  CTA b/l  Psychiatry:  Nonverbal  Gastrointestinal:  Soft, Non-tender, + BS	  Skin: No rashes, No ecchymoses, No cyanosis	  Extremities:  B/l LE with no edema, asymmetry of girth, skin changes; extremities are warm with no ulcers or wounds, no phlegmasia    LABS:	 	    CBC Full  -  ( 14 Dec 2021 06:01 )  WBC Count : 13.14 K/uL  Hemoglobin : 8.2 g/dL  Hematocrit : 26.9 %  Platelet Count - Automated : 419 K/uL  Mean Cell Volume : 97.1 fl  Mean Cell Hemoglobin : 29.6 pg  Mean Cell Hemoglobin Concentration : 30.5 gm/dL  Auto Neutrophil # : x  Auto Lymphocyte # : x  Auto Monocyte # : x  Auto Eosinophil # : x  Auto Basophil # : x  Auto Neutrophil % : x  Auto Lymphocyte % : x  Auto Monocyte % : x  Auto Eosinophil % : x  Auto Basophil % : x    12-14    151<H>  |  109<H>  |  46<H>  ----------------------------<  176<H>  4.1   |  30  |  0.98  12-13    150<H>  |  108  |  46<H>  ----------------------------<  156<H>  4.2   |  31  |  0.98    Ca    9.3      14 Dec 2021 06:01  Ca    9.4      13 Dec 2021 22:38    TPro  6.3  /  Alb  3.2<L>  /  TBili  0.2  /  DBili  x   /  AST  20  /  ALT  24  /  AlkPhos  113  12-14      Assessment:  1. IVH  2. C. difficile diarrhea  3. Gram-negative pneumonia.   4. Respiratory failure.   5. B/l below knee DVTs  8. CAD     Plan:  1. Eliquis 5mg PO BID resumed for b/l below knee DVTs.  2. Monitor H/H and transfuse for Hb<7.  3. Continue excellent NSG care.  4. Will sign off at this time. Please call us back with any further questions, or if he demonstrates signs of acute blood loss or worsening thrombosis.     Thank you for allowing us to participate in the care of this patient,    Please call with any questions:   Service Line: [phone]  Office [phone]  email:  [email or web address]

## 2021-12-14 NOTE — PROGRESS NOTE ADULT - ASSESSMENT
61 year old man with respiratory failure d/t ICH    1. ICH  -per neurosurgery,  -s/p  shunt    2. Respiratory failure  -for tracheostomy, will require long term enteral nutrition  -s/p PEG, tolerating feeds  - aspiration precautions     3. Enterobacter PNA  -s/p course of antibiotics     4. afib with RVR    5. Anemia, no overt GI bleed. EGD unremarkable.   -hgb dropped to 6.9 last night; received 1 unit of PRBCs- hgb this morning increased to 8.2  -trend CBC  -continue PPI  -monitor for GI bleeding as apixaban has been resumed     6. Cdiff infection   -s/p abx course  -PO vanco discontinued   -episode of non-bloody diarrhea this am  -consider changing to Vital tube feeds     7. DVT on a/c  -apixaban 5mg resumed  -continue PPI     8. fever of 101.3F on 12/13  -BC and RVP sent; results pending     Attending supervision statement: I have personally seen and examined the patient. I fully participated in the care of this patient. I have made amendments to the documentation where necessary, and agree with the history, physical exam, and plan as outlined by the ACP.      [facility]  Gastroenterology and Hepatology  [address] [place], [place]  Office: [phone]  Cell: [phone]   61 year old man with respiratory failure d/t ICH    1. ICH  -per neurosurgery,  -s/p  shunt    2. Respiratory failure  -for tracheostomy, will require long term enteral nutrition  -s/p PEG, tolerating feeds  - aspiration precautions     3. Enterobacter PNA  -s/p course of antibiotics     4. afib with RVR    5. Anemia, no overt GI bleed. EGD unremarkable.   -hgb dropped to 6.9 last night; received 1 unit of PRBCs- hgb this morning increased to 8.2  -trend CBC  -continue PPI  -monitor for GI bleeding as apixaban has been resumed     6. Cdiff infection   -s/p abx course  -PO vanco discontinued   -episode of non-bloody diarrhea this am  -consider adding banatrol to feeds     7. DVT on a/c  -apixaban 5mg resumed  -continue PPI     8. fever of 101.3F on 12/13  -BC and RVP sent; results pending     Attending supervision statement: I have personally seen and examined the patient. I fully participated in the care of this patient. I have made amendments to the documentation where necessary, and agree with the history, physical exam, and plan as outlined by the ACP.      [facility]  Gastroenterology and Hepatology  [address] [place], [place]  Office: [phone]  Cell: [phone]

## 2021-12-14 NOTE — PROGRESS NOTE ADULT - SUBJECTIVE AND OBJECTIVE BOX
Chief Complaint:  Patient is a 61y old  Male who presents with a chief complaint of ICH (08 Dec 2021 08:58)      Date of service 21 @ 13:46      Interval Events:   Patient seen and examined. Febrile last night. 99F most recent temp. Hgb dropped to 6.9 last night. s/p 1 unit of PRBCs, hgb increased to 8.2 this morning. Patient had episode of brown diarrhea this morning. No melena, no brbpr.     Hospital Medications:  acetaminophen    Suspension .. 650 milliGRAM(s) Oral every 6 hours PRN  acetaminophen   IVPB .. 1000 milliGRAM(s) IV Intermittent once  acetylcysteine 20%  Inhalation 4 milliLiter(s) Inhalation every 6 hours  albuterol/ipratropium for Nebulization 3 milliLiter(s) Nebulizer every 6 hours  apixaban 5 milliGRAM(s) Oral two times a day  artificial  tears Solution 1 Drop(s) Both EYES every 6 hours  atorvastatin 40 milliGRAM(s) Oral at bedtime  chlorhexidine 0.12% Liquid 15 milliLiter(s) Oral Mucosa two times a day  dextrose 50% Injectable 25 Gram(s) IV Push once  dextrose 50% Injectable 12.5 Gram(s) IV Push once  doxazosin 2 milliGRAM(s) Oral daily  insulin lispro (ADMELOG) corrective regimen sliding scale   SubCutaneous every 6 hours  insulin NPH human recombinant 12 Unit(s) SubCutaneous every 6 hours  metoprolol tartrate 12.5 milliGRAM(s) Oral two times a day  multivitamin 1 Tablet(s) Oral daily  nystatin Powder 1 Application(s) Topical two times a day  pantoprazole   Suspension 40 milliGRAM(s) Oral daily  sodium chloride 0.9% Bolus 500 milliLiter(s) IV Bolus once  sodium chloride 3%  Inhalation 3 milliLiter(s) Inhalation every 6 hours        Review of Systems:  unable to obtain    PHYSICAL EXAM:   Vital Signs:  Vital Signs Last 24 Hrs  T(C): 37.2 (14 Dec 2021 12:44), Max: 38.7 (13 Dec 2021 20:56)  T(F): 99 (14 Dec 2021 12:44), Max: 101.7 (13 Dec 2021 20:56)  HR: 112 (14 Dec 2021 12:44) (101 - 112)  BP: 116/77 (14 Dec 2021 12:44) (116/77 - 134/79)  BP(mean): --  RR: 20 (14 Dec 2021 12:44) (18 - 20)  SpO2: 96% (14 Dec 2021 12:44) (96% - 100%)  Daily     Daily       PHYSICAL EXAM:     GENERAL:  Appears stated age, well-groomed, well-nourished, no distress  HEENT:  NC/AT,  conjunctivae anicteric, clear and pink,   NECK: supple, trachea midline  CHEST:  Full & symmetric excursion, no increased effort, breath sounds clear  HEART:  Regular rhythm, no JVD  ABDOMEN:  Soft, non-tender, non-distended, normoactive bowel sounds,  no masses , no hepatosplenomegaly, +peg with no redness, draining or bleeding near peg site   EXTREMITIES:  no cyanosis,clubbing or edema  SKIN:  No rash, erythema, or, ecchymoses, no jaundice  NEURO: non-focal, no asterixis  RECTAL: Deferred      LABS Personally reviewed by me:                        8.2     )-----------( 419      ( 14 Dec 2021 06:01 )             26.9     Mean Cell Volume: 97.1 fl (- @ 06:01)    12-14    151<H>  |  109<H>  |  46<H>  ----------------------------<  176<H>  4.1   |  30  |  0.98    Ca    9.3      14 Dec 2021 06:01    TPro  6.3  /  Alb  3.2<L>  /  TBili  0.2  /  DBili  x   /  AST  20  /  ALT  24  /  AlkPhos  113  12-14    LIVER FUNCTIONS - ( 14 Dec 2021 06:01 )  Alb: 3.2 g/dL / Pro: 6.3 g/dL / ALK PHOS: 113 U/L / ALT: 24 U/L / AST: 20 U/L / GGT: x             Urinalysis Basic - ( 13 Dec 2021 23:33 )    Color: Yellow / Appearance: Slightly Turbid / S.021 / pH: x  Gluc: x / Ketone: Negative  / Bili: Negative / Urobili: Negative   Blood: x / Protein: 30 mg/dL / Nitrite: Negative   Leuk Esterase: Negative / RBC: 10 /hpf / WBC 3 /HPF   Sq Epi: x / Non Sq Epi: 2 /hpf / Bacteria: Negative                              8.2    13.14 )-----------( 419      ( 14 Dec 2021 06:01 )             26.9                         6.9    12.02 )-----------( 480      ( 13 Dec 2021 22:38 )             22.6                         7.9    8.99  )-----------( 443      ( 13 Dec 2021 06:24 )             25.6       Imaging personally reviewed by me:

## 2021-12-14 NOTE — PROGRESS NOTE ADULT - PROBLEM SELECTOR PLAN 7
s/p stent. was on DAPT per prescription refills   - resume ASA when clear from neurosurgery standpoint  - c/w statin (transaminitis resolved)  - patient was on metoprolol ER 25mg at home. continue metoprolol 12.5mg BID  - GI noting afib with RVR?? but no other documentation in chart found supporting that and patient seems to be in sinus rhythm

## 2021-12-14 NOTE — PROGRESS NOTE ADULT - ATTENDING COMMENTS
Events that transpired overnight were gone over.  Drop in H/H.  S/P 1 U of PRBC.  No clear source of blood lose.  ?Blood draws.  Recommend daily CBCs.  Agree with physical examination.  Continue Eliquis 5mg PO BID for now.   Monitor closely for signs and symptoms of bleeding.  If unable to tolerate AC recommend reconsultation to discuss pros/cons of IVC filter placement or close surveillance.

## 2021-12-15 LAB
ANION GAP SERPL CALC-SCNC: 10 MMOL/L — SIGNIFICANT CHANGE UP (ref 5–17)
ANION GAP SERPL CALC-SCNC: 13 MMOL/L — SIGNIFICANT CHANGE UP (ref 5–17)
BUN SERPL-MCNC: 45 MG/DL — HIGH (ref 7–23)
BUN SERPL-MCNC: 50 MG/DL — HIGH (ref 7–23)
CALCIUM SERPL-MCNC: 7.4 MG/DL — LOW (ref 8.4–10.5)
CALCIUM SERPL-MCNC: 9.1 MG/DL — SIGNIFICANT CHANGE UP (ref 8.4–10.5)
CHLORIDE SERPL-SCNC: 102 MMOL/L — SIGNIFICANT CHANGE UP (ref 96–108)
CHLORIDE SERPL-SCNC: 111 MMOL/L — HIGH (ref 96–108)
CO2 SERPL-SCNC: 23 MMOL/L — SIGNIFICANT CHANGE UP (ref 22–31)
CO2 SERPL-SCNC: 30 MMOL/L — SIGNIFICANT CHANGE UP (ref 22–31)
CREAT SERPL-MCNC: 0.83 MG/DL — SIGNIFICANT CHANGE UP (ref 0.5–1.3)
CREAT SERPL-MCNC: 1.02 MG/DL — SIGNIFICANT CHANGE UP (ref 0.5–1.3)
CULTURE RESULTS: NO GROWTH — SIGNIFICANT CHANGE UP
GLUCOSE SERPL-MCNC: 112 MG/DL — HIGH (ref 70–99)
GLUCOSE SERPL-MCNC: 961 MG/DL — CRITICAL HIGH (ref 70–99)
HCT VFR BLD CALC: 29.3 % — LOW (ref 39–50)
HGB BLD-MCNC: 8.9 G/DL — LOW (ref 13–17)
MCHC RBC-ENTMCNC: 29.5 PG — SIGNIFICANT CHANGE UP (ref 27–34)
MCHC RBC-ENTMCNC: 30.4 GM/DL — LOW (ref 32–36)
MCV RBC AUTO: 97 FL — SIGNIFICANT CHANGE UP (ref 80–100)
NRBC # BLD: 0 /100 WBCS — SIGNIFICANT CHANGE UP (ref 0–0)
PLATELET # BLD AUTO: 404 K/UL — HIGH (ref 150–400)
POTASSIUM SERPL-MCNC: 3.1 MMOL/L — LOW (ref 3.5–5.3)
POTASSIUM SERPL-MCNC: 4 MMOL/L — SIGNIFICANT CHANGE UP (ref 3.5–5.3)
POTASSIUM SERPL-SCNC: 3.1 MMOL/L — LOW (ref 3.5–5.3)
POTASSIUM SERPL-SCNC: 4 MMOL/L — SIGNIFICANT CHANGE UP (ref 3.5–5.3)
RBC # BLD: 3.02 M/UL — LOW (ref 4.2–5.8)
RBC # FLD: 14.1 % — SIGNIFICANT CHANGE UP (ref 10.3–14.5)
SODIUM SERPL-SCNC: 135 MMOL/L — SIGNIFICANT CHANGE UP (ref 135–145)
SODIUM SERPL-SCNC: 154 MMOL/L — HIGH (ref 135–145)
SPECIMEN SOURCE: SIGNIFICANT CHANGE UP
WBC # BLD: 12.53 K/UL — HIGH (ref 3.8–10.5)
WBC # FLD AUTO: 12.53 K/UL — HIGH (ref 3.8–10.5)

## 2021-12-15 PROCEDURE — 99232 SBSQ HOSP IP/OBS MODERATE 35: CPT | Mod: 25

## 2021-12-15 PROCEDURE — 31615 TRCHEOBRNCHSC EST TRACHS INC: CPT

## 2021-12-15 PROCEDURE — 99233 SBSQ HOSP IP/OBS HIGH 50: CPT

## 2021-12-15 RX ORDER — SODIUM CHLORIDE 9 MG/ML
1000 INJECTION, SOLUTION INTRAVENOUS
Refills: 0 | Status: DISCONTINUED | OUTPATIENT
Start: 2021-12-15 | End: 2021-12-19

## 2021-12-15 RX ADMIN — Medication 2: at 16:47

## 2021-12-15 RX ADMIN — Medication 1 DROP(S): at 13:05

## 2021-12-15 RX ADMIN — Medication 4 MILLILITER(S): at 13:04

## 2021-12-15 RX ADMIN — Medication 3 MILLILITER(S): at 11:28

## 2021-12-15 RX ADMIN — Medication 1 DROP(S): at 05:28

## 2021-12-15 RX ADMIN — Medication 1 DROP(S): at 23:48

## 2021-12-15 RX ADMIN — SODIUM CHLORIDE 3 MILLILITER(S): 9 INJECTION INTRAMUSCULAR; INTRAVENOUS; SUBCUTANEOUS at 17:27

## 2021-12-15 RX ADMIN — NYSTATIN CREAM 1 APPLICATION(S): 100000 CREAM TOPICAL at 17:28

## 2021-12-15 RX ADMIN — HUMAN INSULIN 12 UNIT(S): 100 INJECTION, SUSPENSION SUBCUTANEOUS at 00:06

## 2021-12-15 RX ADMIN — APIXABAN 5 MILLIGRAM(S): 2.5 TABLET, FILM COATED ORAL at 17:29

## 2021-12-15 RX ADMIN — Medication 12.5 MILLIGRAM(S): at 17:29

## 2021-12-15 RX ADMIN — Medication 4 MILLILITER(S): at 00:09

## 2021-12-15 RX ADMIN — Medication 650 MILLIGRAM(S): at 00:08

## 2021-12-15 RX ADMIN — Medication 3 MILLILITER(S): at 17:28

## 2021-12-15 RX ADMIN — CHLORHEXIDINE GLUCONATE 15 MILLILITER(S): 213 SOLUTION TOPICAL at 05:28

## 2021-12-15 RX ADMIN — HUMAN INSULIN 12 UNIT(S): 100 INJECTION, SUSPENSION SUBCUTANEOUS at 13:09

## 2021-12-15 RX ADMIN — Medication 1 DROP(S): at 00:04

## 2021-12-15 RX ADMIN — CHLORHEXIDINE GLUCONATE 15 MILLILITER(S): 213 SOLUTION TOPICAL at 17:28

## 2021-12-15 RX ADMIN — SODIUM CHLORIDE 3 MILLILITER(S): 9 INJECTION INTRAMUSCULAR; INTRAVENOUS; SUBCUTANEOUS at 00:08

## 2021-12-15 RX ADMIN — Medication 1 TABLET(S): at 13:06

## 2021-12-15 RX ADMIN — Medication 2: at 23:49

## 2021-12-15 RX ADMIN — SODIUM CHLORIDE 3 MILLILITER(S): 9 INJECTION INTRAMUSCULAR; INTRAVENOUS; SUBCUTANEOUS at 11:28

## 2021-12-15 RX ADMIN — HUMAN INSULIN 12 UNIT(S): 100 INJECTION, SUSPENSION SUBCUTANEOUS at 16:48

## 2021-12-15 RX ADMIN — PANTOPRAZOLE SODIUM 40 MILLIGRAM(S): 20 TABLET, DELAYED RELEASE ORAL at 13:04

## 2021-12-15 RX ADMIN — SODIUM CHLORIDE 3 MILLILITER(S): 9 INJECTION INTRAMUSCULAR; INTRAVENOUS; SUBCUTANEOUS at 23:50

## 2021-12-15 RX ADMIN — Medication 3 MILLILITER(S): at 00:09

## 2021-12-15 RX ADMIN — HUMAN INSULIN 12 UNIT(S): 100 INJECTION, SUSPENSION SUBCUTANEOUS at 05:30

## 2021-12-15 RX ADMIN — Medication 2 MILLIGRAM(S): at 05:30

## 2021-12-15 RX ADMIN — Medication 4 MILLILITER(S): at 23:49

## 2021-12-15 RX ADMIN — NYSTATIN CREAM 1 APPLICATION(S): 100000 CREAM TOPICAL at 05:29

## 2021-12-15 RX ADMIN — SODIUM CHLORIDE 3 MILLILITER(S): 9 INJECTION INTRAMUSCULAR; INTRAVENOUS; SUBCUTANEOUS at 05:29

## 2021-12-15 RX ADMIN — Medication 1 DROP(S): at 17:26

## 2021-12-15 RX ADMIN — Medication 3 MILLILITER(S): at 05:29

## 2021-12-15 RX ADMIN — ATORVASTATIN CALCIUM 40 MILLIGRAM(S): 80 TABLET, FILM COATED ORAL at 23:48

## 2021-12-15 RX ADMIN — Medication 3 MILLILITER(S): at 23:49

## 2021-12-15 RX ADMIN — Medication 2: at 00:04

## 2021-12-15 RX ADMIN — APIXABAN 5 MILLIGRAM(S): 2.5 TABLET, FILM COATED ORAL at 05:30

## 2021-12-15 RX ADMIN — Medication 12.5 MILLIGRAM(S): at 05:30

## 2021-12-15 RX ADMIN — HUMAN INSULIN 12 UNIT(S): 100 INJECTION, SUSPENSION SUBCUTANEOUS at 23:48

## 2021-12-15 RX ADMIN — Medication 2: at 13:09

## 2021-12-15 RX ADMIN — Medication 4 MILLILITER(S): at 17:28

## 2021-12-15 RX ADMIN — Medication 4 MILLILITER(S): at 05:28

## 2021-12-15 NOTE — PROGRESS NOTE ADULT - SUBJECTIVE AND OBJECTIVE BOX
ENT ISSUE/POD:  trach eval, not able to pass suction catheter    HPI: Called to see pt for trach eval. Nurse and RT were not able to pass suction catheter through trach tube. As per primary team, pt had an episode of desats earlier today and RRT was caled. However, shortly thereafter O2 Sat went back to normal and RRT was canceled. Pt s/p trach 11/19/21 and was recently upsized from a #6 uncuffed shiley to a #6 cuffed. Pt is saturating well.          PAST MEDICAL & SURGICAL HISTORY:  HTN (hypertension)    Diabetes mellitus      Allergies    penicillin (Other)    Intolerances      MEDICATIONS  (STANDING):  acetaminophen   IVPB .. 1000 milliGRAM(s) IV Intermittent once  acetylcysteine 20%  Inhalation 4 milliLiter(s) Inhalation every 6 hours  albuterol/ipratropium for Nebulization 3 milliLiter(s) Nebulizer every 6 hours  apixaban 5 milliGRAM(s) Oral two times a day  artificial  tears Solution 1 Drop(s) Both EYES every 6 hours  atorvastatin 40 milliGRAM(s) Oral at bedtime  chlorhexidine 0.12% Liquid 15 milliLiter(s) Oral Mucosa two times a day  dextrose 5% + sodium chloride 0.45%. 1000 milliLiter(s) (75 mL/Hr) IV Continuous <Continuous>  dextrose 50% Injectable 25 Gram(s) IV Push once  dextrose 50% Injectable 12.5 Gram(s) IV Push once  doxazosin 2 milliGRAM(s) Oral daily  insulin lispro (ADMELOG) corrective regimen sliding scale   SubCutaneous every 6 hours  insulin NPH human recombinant 12 Unit(s) SubCutaneous every 6 hours  metoprolol tartrate 12.5 milliGRAM(s) Oral two times a day  multivitamin 1 Tablet(s) Oral daily  nystatin Powder 1 Application(s) Topical two times a day  pantoprazole   Suspension 40 milliGRAM(s) Oral daily  sodium chloride 0.9% Bolus 500 milliLiter(s) IV Bolus once  sodium chloride 3%  Inhalation 3 milliLiter(s) Inhalation every 6 hours    MEDICATIONS  (PRN):  acetaminophen    Suspension .. 650 milliGRAM(s) Oral every 6 hours PRN Temp greater or equal to 38C (100.4F), Mild Pain (1 - 3)      Social History: see consult    Family history: see consult    ROS:   ENT: all negative except as noted in HPI   Pulm: denies SOB, cough, hemoptysis  Neuro: denies numbness/tingling, loss of sensation  Endo: denies heat/cold intolerance, excessive sweating      Vital Signs Last 24 Hrs  T(C): 36.8 (15 Dec 2021 16:02), Max: 38.3 (14 Dec 2021 23:50)  T(F): 98.2 (15 Dec 2021 16:02), Max: 101 (14 Dec 2021 23:50)  HR: 101 (15 Dec 2021 16:02) (101 - 117)  BP: 104/69 (15 Dec 2021 16:02) (104/69 - 133/75)  BP(mean): --  RR: 18 (15 Dec 2021 16:02) (17 - 20)  SpO2: 97% (15 Dec 2021 16:02) (92% - 98%)                          8.9    12.53 )-----------( 404      ( 15 Dec 2021 05:24 )             29.3    12-15    154<H>  |  111<H>  |  45<H>  ----------------------------<  112<H>  4.0   |  30  |  1.02    Ca    9.1      15 Dec 2021 05:24    TPro  6.3  /  Alb  3.2<L>  /  TBili  0.2  /  DBili  x   /  AST  20  /  ALT  24  /  AlkPhos  113  12-14       PHYSICAL EXAM:  Gen: NAD  Skin: No rashes, bruises, or lesions  Head: Normocephalic, Atraumatic  Face: no edema, erythema, or fluctuance. Parotid glands soft without mass  Eyes: no scleral injection  Nose: Nares bilaterally patent, no discharge  Mouth: No Stridor / Drooling / Trismus.  Mucosa moist, tongue/uvula midline, oropharynx clear  Neck: #6 cuffed shiley trach partially dislodged, unable to pass suction catheter,  supple, no lymphadenopathy, trachea midline, no masses  Lymphatic: No lymphadenopathy  Resp: no stridor  Neuro: facial nerve intact, no facial droop    Procedure: trach change    After placing the pt flat in supine position, tube feeds was turned off, #6 cuffed shiley was fully deflated and removed. A #7 portex uncuffed trach was successfully placed over scope. A false passage was noted. Breonna was visualized, no active bleeding, no tracheomalacia. Pt tolerated the procedure well without complications. ENT ISSUE/POD:  trach eval, not able to pass suction catheter    HPI: Called to see pt for trach eval. Nurse and RT were not able to pass suction catheter through trach tube. As per primary team, pt had an episode of desats earlier today and RRT was caled. However, shortly thereafter O2 Sat went back to normal and RRT was canceled. Pt s/p trach 11/19/21 and was recently upsized from a #6 uncuffed shiley to a #6 cuffed. Pt is saturating well.          PAST MEDICAL & SURGICAL HISTORY:  HTN (hypertension)    Diabetes mellitus      Allergies    penicillin (Other)    Intolerances      MEDICATIONS  (STANDING):  acetaminophen   IVPB .. 1000 milliGRAM(s) IV Intermittent once  acetylcysteine 20%  Inhalation 4 milliLiter(s) Inhalation every 6 hours  albuterol/ipratropium for Nebulization 3 milliLiter(s) Nebulizer every 6 hours  apixaban 5 milliGRAM(s) Oral two times a day  artificial  tears Solution 1 Drop(s) Both EYES every 6 hours  atorvastatin 40 milliGRAM(s) Oral at bedtime  chlorhexidine 0.12% Liquid 15 milliLiter(s) Oral Mucosa two times a day  dextrose 5% + sodium chloride 0.45%. 1000 milliLiter(s) (75 mL/Hr) IV Continuous <Continuous>  dextrose 50% Injectable 25 Gram(s) IV Push once  dextrose 50% Injectable 12.5 Gram(s) IV Push once  doxazosin 2 milliGRAM(s) Oral daily  insulin lispro (ADMELOG) corrective regimen sliding scale   SubCutaneous every 6 hours  insulin NPH human recombinant 12 Unit(s) SubCutaneous every 6 hours  metoprolol tartrate 12.5 milliGRAM(s) Oral two times a day  multivitamin 1 Tablet(s) Oral daily  nystatin Powder 1 Application(s) Topical two times a day  pantoprazole   Suspension 40 milliGRAM(s) Oral daily  sodium chloride 0.9% Bolus 500 milliLiter(s) IV Bolus once  sodium chloride 3%  Inhalation 3 milliLiter(s) Inhalation every 6 hours    MEDICATIONS  (PRN):  acetaminophen    Suspension .. 650 milliGRAM(s) Oral every 6 hours PRN Temp greater or equal to 38C (100.4F), Mild Pain (1 - 3)      Social History: see consult    Family history: see consult    ROS:   ENT: all negative except as noted in HPI   Pulm: denies SOB, cough, hemoptysis  Neuro: denies numbness/tingling, loss of sensation  Endo: denies heat/cold intolerance, excessive sweating      Vital Signs Last 24 Hrs  T(C): 36.8 (15 Dec 2021 16:02), Max: 38.3 (14 Dec 2021 23:50)  T(F): 98.2 (15 Dec 2021 16:02), Max: 101 (14 Dec 2021 23:50)  HR: 101 (15 Dec 2021 16:02) (101 - 117)  BP: 104/69 (15 Dec 2021 16:02) (104/69 - 133/75)  BP(mean): --  RR: 18 (15 Dec 2021 16:02) (17 - 20)  SpO2: 97% (15 Dec 2021 16:02) (92% - 98%)                          8.9    12.53 )-----------( 404      ( 15 Dec 2021 05:24 )             29.3    12-15    154<H>  |  111<H>  |  45<H>  ----------------------------<  112<H>  4.0   |  30  |  1.02    Ca    9.1      15 Dec 2021 05:24    TPro  6.3  /  Alb  3.2<L>  /  TBili  0.2  /  DBili  x   /  AST  20  /  ALT  24  /  AlkPhos  113  12-14       PHYSICAL EXAM:  Gen: NAD  Skin: No rashes, bruises, or lesions  Head: Normocephalic, Atraumatic  Face: no edema, erythema, or fluctuance. Parotid glands soft without mass  Eyes: no scleral injection  Nose: Nares bilaterally patent, no discharge  Mouth: No Stridor / Drooling / Trismus.  Mucosa moist, tongue/uvula midline, oropharynx clear  Neck: #6 cuffed shiley trach partially dislodged, unable to pass suction catheter,  supple, no lymphadenopathy, trachea midline, no masses  Lymphatic: No lymphadenopathy  Resp: no stridor  Neuro: facial nerve intact, no facial droop    Procedure: trach change, tracheoscopy   scope #3    After placing the pt flat in supine position, tube feeds was turned off, #6 cuffed shiley was fully deflated and removed. A #7 portex uncuffed trach was successfully placed over scope. A false passage was noted. Breonna was visualized, no active bleeding, no tracheomalacia. Pt tolerated the procedure well without complications.

## 2021-12-15 NOTE — PROGRESS NOTE ADULT - SUBJECTIVE AND OBJECTIVE BOX
Chief Complaint:  Patient is a 61y old  Male who presents with a chief complaint of ICH (15 Dec 2021 11:21)      Date of service 12-15-21 @ 12:52      Interval Events:   Patient seen and examined. Patient febrile last night; currently afebile. ID at bedside. No additional episodes of diarrhea. Last BM yesterday. No BM today. No melena, brbpr, vomiting.     Hospital Medications:  acetaminophen    Suspension .. 650 milliGRAM(s) Oral every 6 hours PRN  acetaminophen   IVPB .. 1000 milliGRAM(s) IV Intermittent once  acetylcysteine 20%  Inhalation 4 milliLiter(s) Inhalation every 6 hours  albuterol/ipratropium for Nebulization 3 milliLiter(s) Nebulizer every 6 hours  apixaban 5 milliGRAM(s) Oral two times a day  artificial  tears Solution 1 Drop(s) Both EYES every 6 hours  atorvastatin 40 milliGRAM(s) Oral at bedtime  chlorhexidine 0.12% Liquid 15 milliLiter(s) Oral Mucosa two times a day  dextrose 5% + sodium chloride 0.45%. 1000 milliLiter(s) IV Continuous <Continuous>  dextrose 50% Injectable 25 Gram(s) IV Push once  dextrose 50% Injectable 12.5 Gram(s) IV Push once  doxazosin 2 milliGRAM(s) Oral daily  insulin lispro (ADMELOG) corrective regimen sliding scale   SubCutaneous every 6 hours  insulin NPH human recombinant 12 Unit(s) SubCutaneous every 6 hours  metoprolol tartrate 12.5 milliGRAM(s) Oral two times a day  multivitamin 1 Tablet(s) Oral daily  nystatin Powder 1 Application(s) Topical two times a day  pantoprazole   Suspension 40 milliGRAM(s) Oral daily  sodium chloride 0.9% Bolus 500 milliLiter(s) IV Bolus once  sodium chloride 3%  Inhalation 3 milliLiter(s) Inhalation every 6 hours        Review of Systems:  unable to obtain    PHYSICAL EXAM:   Vital Signs:  Vital Signs Last 24 Hrs  T(C): 36.8 (15 Dec 2021 12:22), Max: 38.3 (14 Dec 2021 23:50)  T(F): 98.3 (15 Dec 2021 12:22), Max: 101 (14 Dec 2021 23:50)  HR: 114 (15 Dec 2021 12:22) (104 - 117)  BP: 120/76 (15 Dec 2021 12:22) (120/76 - 133/75)  BP(mean): --  RR: 18 (15 Dec 2021 12:22) (17 - 20)  SpO2: 96% (15 Dec 2021 12:22) (92% - 98%)  Daily     Daily       PHYSICAL EXAM:     GENERAL:  Appears stated age, well-groomed, well-nourished, no distress  HEENT:  NC/AT,  conjunctivae anicteric, clear and pink,   NECK: supple, trachea midline  CHEST:  Full & symmetric excursion, no increased effort, breath sounds clear  HEART:  Regular rhythm, no JVD  ABDOMEN:  Soft, non-tender, non-distended, normoactive bowel sounds,  no masses , no hepatosplenomegaly, +peg   EXTREMITIES:  no cyanosis,clubbing or edema  SKIN:  No rash, erythema, or, ecchymoses, no jaundice  NEURO:  non-focal, no asterixis  RECTAL: Deferred      LABS Personally reviewed by me:                        8.9    12.53 )-----------( 404      ( 15 Dec 2021 05:24 )             29.3     Mean Cell Volume: 97.0 fl (12-15- @ 05:24)    12-15    154<H>  |  111<H>  |  45<H>  ----------------------------<  112<H>  4.0   |  30  |  1.02    Ca    9.1      15 Dec 2021 05:24    TPro  6.3  /  Alb  3.2<L>  /  TBili  0.2  /  DBili  x   /  AST  20  /  ALT  24  /  AlkPhos  113  12-14    LIVER FUNCTIONS - ( 14 Dec 2021 06:01 )  Alb: 3.2 g/dL / Pro: 6.3 g/dL / ALK PHOS: 113 U/L / ALT: 24 U/L / AST: 20 U/L / GGT: x             Urinalysis Basic - ( 13 Dec 2021 23:33 )    Color: Yellow / Appearance: Slightly Turbid / S.021 / pH: x  Gluc: x / Ketone: Negative  / Bili: Negative / Urobili: Negative   Blood: x / Protein: 30 mg/dL / Nitrite: Negative   Leuk Esterase: Negative / RBC: 10 /hpf / WBC 3 /HPF   Sq Epi: x / Non Sq Epi: 2 /hpf / Bacteria: Negative                              8.9    12.53 )-----------( 404      ( 15 Dec 2021 05:24 )             29.3                         8.2    13.14 )-----------( 419      ( 14 Dec 2021 06:01 )             26.9                         6.9    12.02 )-----------( 480      ( 13 Dec 2021 22:38 )             22.6                         7.9    8.99  )-----------( 443      ( 13 Dec 2021 06:24 )             25.6       Imaging personally reviewed by me:

## 2021-12-15 NOTE — PROGRESS NOTE ADULT - SUBJECTIVE AND OBJECTIVE BOX
SUBJECTIVE: Pt seen and examined, resting in bed, appears comfortable, +cough requiring suctioning    OVERNIGHT EVENTS: T 101    Vital Signs Last 24 Hrs  T(C): 36.8 (15 Dec 2021 12:22), Max: 38.3 (14 Dec 2021 23:50)  T(F): 98.3 (15 Dec 2021 12:22), Max: 101 (14 Dec 2021 23:50)  HR: 114 (15 Dec 2021 12:22) (104 - 117)  BP: 120/76 (15 Dec 2021 12:22) (120/76 - 133/75)  BP(mean): --  RR: 18 (15 Dec 2021 12:22) (17 - 20)  SpO2: 96% (15 Dec 2021 12:22) (92% - 98%)    PHYSICAL EXAM:    General: No Acute Distress     Neurological: opens eyes, gave thumbs up on Left, wiggles toes bilat    Pulmonary: Clear to Auscultation, No Rales, No Rhonchi, No Wheezes     Cardiovascular: S1, S2, Regular Rate and Rhythm     Gastrointestinal: Soft, Nontender, Nondistended     Incision: healed incision    LABS:                        8.9    12.53 )-----------( 404      ( 15 Dec 2021 05:24 )             29.3    12-15    154<H>  |  111<H>  |  45<H>  ----------------------------<  112<H>  4.0   |  30  |  1.02    Ca    9.1      15 Dec 2021 05:24    TPro  6.3  /  Alb  3.2<L>  /  TBili  0.2  /  DBili  x   /  AST  20  /  ALT  24  /  AlkPhos  113  12-14 12-14 @ 07:01  -  12-15 @ 07:00  --------------------------------------------------------  IN: 0 mL / OUT: 1850 mL / NET: -1850 mL      DRAINS: none    MEDICATIONS:  Antibiotics:    Neuro:  acetaminophen    Suspension .. 650 milliGRAM(s) Oral every 6 hours PRN Temp greater or equal to 38C (100.4F), Mild Pain (1 - 3)  acetaminophen   IVPB .. 1000 milliGRAM(s) IV Intermittent once    Cardiac:  doxazosin 2 milliGRAM(s) Oral daily  metoprolol tartrate 12.5 milliGRAM(s) Oral two times a day    Pulm:  acetylcysteine 20%  Inhalation 4 milliLiter(s) Inhalation every 6 hours  albuterol/ipratropium for Nebulization 3 milliLiter(s) Nebulizer every 6 hours  sodium chloride 3%  Inhalation 3 milliLiter(s) Inhalation every 6 hours    GI/:  pantoprazole   Suspension 40 milliGRAM(s) Oral daily    Other:   apixaban 5 milliGRAM(s) Oral two times a day  artificial  tears Solution 1 Drop(s) Both EYES every 6 hours  atorvastatin 40 milliGRAM(s) Oral at bedtime  chlorhexidine 0.12% Liquid 15 milliLiter(s) Oral Mucosa two times a day  dextrose 5% + sodium chloride 0.45%. 1000 milliLiter(s) IV Continuous <Continuous>  dextrose 50% Injectable 25 Gram(s) IV Push once  dextrose 50% Injectable 12.5 Gram(s) IV Push once  insulin lispro (ADMELOG) corrective regimen sliding scale   SubCutaneous every 6 hours  insulin NPH human recombinant 12 Unit(s) SubCutaneous every 6 hours  multivitamin 1 Tablet(s) Oral daily  nystatin Powder 1 Application(s) Topical two times a day  sodium chloride 0.9% Bolus 500 milliLiter(s) IV Bolus once    DIET: [] Regular [x] CCD [] Renal [] Puree [] Dysphagia [] Tube Feeds:     IMAGING:   < from: CT Head No Cont (12.12.21 @ 15:38) >    IMPRESSION:    Similar ventricular size when compared to 12/9/2021.    < end of copied text >  < from: [facility] Duplex Lower Ext Vein Scan, Bilat (12.08.21 @ 12:21) >    IMPRESSION:    Persistent bilateral below the knees deep venous thrombosis in the   bilateral calf veins. No propagation.    < end of copied text >

## 2021-12-15 NOTE — PROGRESS NOTE ADULT - PROBLEM SELECTOR PLAN 1
Plan: continue with trach care  Please keep trach phalange flush to the skin  Avoid putting too much gauze undder the trach phalange  call ent with any further issues.

## 2021-12-15 NOTE — PROGRESS NOTE ADULT - ASSESSMENT
60 yo male with PMH of HTN   Admitted on 11/4 with severe headache and found to have cerebellar bleed.  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  S/p trach and peg.   He has distal DVT's - on apixaban.  He received cefepime 11/4-11/21 for respiratory coverage.  Developed watery C diff diarrhea on 11/26 - started on oral vancomycin   Then restarted on cefepime on 11/28 for concerns of pneumonia since had low grade temps. Giorgio neb added later  But CXR's remained clear.  Ct of brain showed residual blood.  Pseudomonas in sputum found to be resistant to carbapenems & quinolones. Sputum isolated strep pneumoniae as well    11/28 urine and blood cultures are negative.  Cefepime completed on 12/06 & Giorgio nebs on 12/07/21  Failed TOV & teixeira was replaced for retention of 1000 ml of urine on 12/06/21 PM  Spiked a fever to 102F ? in response to retention  UA with 3 WBCs, no nit or LE, large blood   CXR with clear lungs  CTH revealed a Pseudomeningocele, s/p tap with gram stain without organism  CSF finalized as no growth  Blood & urine cx also finalized as no growth  Completed treatment for C diff w 2 wks of oral vanco on 12/10/21  Diarrhea resolved  Now with fever x 2-3 days, no clinical evidence of C Diff relapse, UA bland, and CXR is clear.  Repeat blood cultures and urine culture negative, sputum with MURF.  Hence, I am not sure why febrile.  ? Small aspirations, ? occult hematoma  Suggest:  1.Cautiously follow off antibiotics, I would like to be judicious given recent C Diff  2.Favor CT scan of C/A/P to look for occult source of infection  3.CTX for pulmonary coverage if he deteriorates.

## 2021-12-15 NOTE — PROGRESS NOTE ADULT - PROBLEM SELECTOR PLAN 10
on eliquis  monitor BM- last BM 12/15    increased free water to 300cc q4h for hypernatremia but unclear if actually being given- discussed with nursing.   rec D5 +1/2 NS IVF as well given fever and tachycardia.  Monitor BMP

## 2021-12-15 NOTE — PROGRESS NOTE ADULT - ASSESSMENT
61M with PMH of HTN, CAD s/p stent on DAPT, T2DM who complained of headache and subsequently became unresponsive found to have posterior fossa hemorrhage, IVH, hydrocephalus, s/p EVD and SOC on 11/4. 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 Course c/b respiratory failure and dysphagia s/p trach/PEG (11/19), VPS (11/23),Certas @4 completed course of cefepime for enterobacter and pseudomonas pneumonia on 11/21, fevers, c. diff colitis, urinary retention, b/l distal DVT.       PLAN:  Neuro:   - shunt  Certas @4  - PICA aneurysm resection  - pt is DNR  Respiratory:   - trach dislodged- ENT called to replace  - continue duoneb and mucomyst  - trach care, suction prn  - PE/DVT- on apixaban  CV:  - HTN- continue metoprolol  - on doxazosin for urinary retention  Endocrine:   - DMT2- continue fingersticks with NPH 12u q6  DVT ppx:   - on eliquis for dvt  Renal:   - hypernatremia- started on D5 1/2 NS @ 75cc/hr @75 x 500 cc, f/u BMP   - MICHELLE has resolved  -Urinary retention- requiring straight cath  ID:   - recurrent fever- ID recs appreciated  - Cautiously follow off antibiotics  - follow up CT scan of C/A/P to look for occult source of infection  - CTX for pulmonary coverage if he deteriorates per ID  - completed course of po vanco for c diff  GI:    - tolerating tube feeds via PEG  PT/OT:   - CHUYITA      NMRKT # [id number]

## 2021-12-15 NOTE — PROGRESS NOTE ADULT - PROBLEM SELECTOR PLAN 1
afebrile since 12/6 but recurrent fevers starting 12/13  Blood/urine/CSF cultures from 12/6-7 negative to date  repeat UA/Ucx negative  CXR clear  COVID PCR and RVP neg  sputum cx with normal resp bindu  repeat Bcx neg  check CT c/a/p for further evaluation  ID follow up appreciated  monitor off abx for now  IVF

## 2021-12-15 NOTE — PROGRESS NOTE ADULT - SUBJECTIVE AND OBJECTIVE BOX
CC: f/u for fever    Patient reports: he is non verbal.He is s/p drainage of ICH with requirement for VPS.  Hospital course complicated by difficult wean,s/p trach and peg.He has been treated for C Diff with 2 weeks of vanco and has also received cefepime and HAMILTON for purulent airway secretions, he has been off antibiotics for 1 week.  He has had  fever x 48 hours after being afebrile for over 1 week.He also has an elevated wbc and has required a transfusion for a drop in Hemoglobin, no overt GI bleed.  He has been stable on TC and his RN has not had to do much suctioning today.  He is being followed off antibiotics.    REVIEW OF SYSTEMS:  All other review of systems negative (Comprehensive ROS)    Antimicrobials Day #  :    Other Medications Reviewed  Home Medications:  aspirin 81 mg oral tablet: 1 tab(s) orally once a day (2021 15:57)  atorvastatin 80 mg oral tablet: 1 tab(s) orally once a day (at bedtime) (2021 15:57)  benazepril 20 mg oral tablet: 1 tab(s) orally once a day (2021 15:57)  chlorthalidone 25 mg oral tablet: 1 tab(s) orally once a day (2021 15:57)  metFORMIN 1000 mg oral tablet: 1 tab(s) orally 2 times a day (2021 15:57)  metoprolol succinate 25 mg oral tablet, extended release: 1 tab(s) orally once a day (2021 15:57)  pioglitazone 30 mg oral tablet: 1 tab(s) orally once a day (2021 15:57)  Plavix 75 mg oral tablet: 1 tab(s) orally once a day (2021 15:57)  Trulicity Pen 0.75 mg/0.5 mL subcutaneous solution: 1 application subcutaneous every 7 days (2021 15:57)    T(F): 98.6 (12-15-21 @ 08:48), Max: 101 (21 @ 23:50)  HR: 104 (12-15-21 @ 08:48)  BP: 122/85 (12-15-21 @ 08:48)  RR: 17 (12-15-21 @ 08:48)  SpO2: 97% (12-15-21 @ 08:48)  Wt(kg): --    PHYSICAL EXAM:  General: lethargic, no acute distress  Eyes:  anicteric, no conjunctival injection, no discharge  Oropharynx: no lesions or injection 	  Neck: supple, trach  Lungs: scattered ronchi  Heart: regular rate and rhythm; no murmur, rubs or gallops  Abdomen: soft, nondistended, nontender, without mass or organomegaly, peg in place  Skin: no lesions  Extremities: no clubbing, cyanosis, or edema  Neurologic: poorly interactive    LAB RESULTS:                        8.9    12.53 )-----------( 404      ( 15 Dec 2021 05:24 )             29.3     12-15    154<H>  |  111<H>  |  45<H>  ----------------------------<  112<H>  4.0   |  30  |  1.02    Ca    9.1      15 Dec 2021 05:24    TPro  6.3  /  Alb  3.2<L>  /  TBili  0.2  /  DBili  x   /  AST  20  /  ALT  24  /  AlkPhos  113  12-14    LIVER FUNCTIONS - ( 14 Dec 2021 06:01 )  Alb: 3.2 g/dL / Pro: 6.3 g/dL / ALK PHOS: 113 U/L / ALT: 24 U/L / AST: 20 U/L / GGT: x           Urinalysis Basic - ( 13 Dec 2021 23:33 )    Color: Yellow / Appearance: Slightly Turbid / S.021 / pH: x  Gluc: x / Ketone: Negative  / Bili: Negative / Urobili: Negative   Blood: x / Protein: 30 mg/dL / Nitrite: Negative   Leuk Esterase: Negative / RBC: 10 /hpf / WBC 3 /HPF   Sq Epi: x / Non Sq Epi: 2 /hpf / Bacteria: Negative      MICROBIOLOGY:  RECENT CULTURES:   @ 08:59 .Sputum Sputum     Normal Respiratory Susan present    Numerous polymorphonuclear leukocytes seen per low power field  Moderate Squamous epithelial cells seen per low power field  Numerous Gram positive cocci in pairs, chains and clusters seen per oil  power field  Moderate Gram Positive Rods seen per oil power field  Moderate Gram Negative Rods seen per oil power field     @ 06:49 Clean Catch Clean Catch (Midstream)     No growth       @ 02:12 .Blood Blood     No growth to date.       @ 02:11 .Blood Blood     No growth to date.          RADIOLOGY REVIEWED:  < from: Xray Chest 1 View- PORTABLE-Urgent (Xray Chest 1 View- PORTABLE-Urgent .) (21 @ 22:38) >  IMPRESSION: Clearlungs.    < end of copied text >  < from: CT Head No Cont (21 @ 15:38) >  INTERPRETATION:  CLINICAL INFORMATION: Altered mental status, evaluate   for hydrocephalus    TECHNIQUE: Noncontrast axial CT images were acquired through the head.   Two-dimensional sagittal and coronal reformats were generated.    COMPARISON STUDY: CT head 2012    FINDINGS:    Right parietal ventriculoperitoneal shunt catheter tip remains in the   right lateral ventricle. The ventricles are unchanged in size since   2021, with a slitlike right ventricle.    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    There is age-related cerebral volume loss. There is moderate patchy white   matter hypoattenuation which is nonspecific in etiology but likely   related to chronic microvascular changes. A postsurgical changes within   the left cerebellum. A persistent linear hyperdense material are noted   within the midline may represent hemorrhage or postsurgical material.   This appears similar to prior studies. No no no    The visualized paranasal sinuses are clear. The mastoid air cells and   middle ear cavities are clear.    The globes are unremarkable.    The soft tissues of the scalp are unremarkable. The calvarium is intact.    IMPRESSION:    Similar ventricular size when compared to 2021.    --- End of Report ---    < end of copied text >

## 2021-12-15 NOTE — PROGRESS NOTE ADULT - SUBJECTIVE AND OBJECTIVE BOX
[facility] Division of Hospital Medicine  Ros Mcgregor MD  spectra [phone]    Patient is a 61y old  Male who presents with a chief complaint of ICH (15 Dec 2021 11:21)      SUBJECTIVE / OVERNIGHT EVENTS: unable to obtain ROS from the patient.   ADDITIONAL REVIEW OF SYSTEMS:    MEDICATIONS  (STANDING):  acetaminophen   IVPB .. 1000 milliGRAM(s) IV Intermittent once  acetylcysteine 20%  Inhalation 4 milliLiter(s) Inhalation every 6 hours  albuterol/ipratropium for Nebulization 3 milliLiter(s) Nebulizer every 6 hours  apixaban 5 milliGRAM(s) Oral two times a day  artificial  tears Solution 1 Drop(s) Both EYES every 6 hours  atorvastatin 40 milliGRAM(s) Oral at bedtime  chlorhexidine 0.12% Liquid 15 milliLiter(s) Oral Mucosa two times a day  dextrose 50% Injectable 25 Gram(s) IV Push once  dextrose 50% Injectable 12.5 Gram(s) IV Push once  doxazosin 2 milliGRAM(s) Oral daily  insulin lispro (ADMELOG) corrective regimen sliding scale   SubCutaneous every 6 hours  insulin NPH human recombinant 12 Unit(s) SubCutaneous every 6 hours  metoprolol tartrate 12.5 milliGRAM(s) Oral two times a day  multivitamin 1 Tablet(s) Oral daily  nystatin Powder 1 Application(s) Topical two times a day  pantoprazole   Suspension 40 milliGRAM(s) Oral daily  sodium chloride 0.9% Bolus 500 milliLiter(s) IV Bolus once  sodium chloride 3%  Inhalation 3 milliLiter(s) Inhalation every 6 hours    MEDICATIONS  (PRN):  acetaminophen    Suspension .. 650 milliGRAM(s) Oral every 6 hours PRN Temp greater or equal to 38C (100.4F), Mild Pain (1 - 3)      CAPILLARY BLOOD GLUCOSE      POCT Blood Glucose.: 182 mg/dL (15 Dec 2021 11:55)  POCT Blood Glucose.: 110 mg/dL (15 Dec 2021 04:48)  POCT Blood Glucose.: 177 mg/dL (14 Dec 2021 23:58)  POCT Blood Glucose.: 190 mg/dL (14 Dec 2021 17:27)    I&O's Summary    14 Dec 2021 07:01  -  15 Dec 2021 07:00  --------------------------------------------------------  IN: 0 mL / OUT: 1850 mL / NET: -1850 mL        PHYSICAL EXAM:  Vital Signs Last 24 Hrs  T(C): 37 (15 Dec 2021 08:48), Max: 38.3 (14 Dec 2021 23:50)  T(F): 98.6 (15 Dec 2021 08:48), Max: 101 (14 Dec 2021 23:50)  HR: 104 (15 Dec 2021 08:48) (104 - 117)  BP: 122/85 (15 Dec 2021 08:48) (116/77 - 133/75)  BP(mean): --  RR: 17 (15 Dec 2021 08:48) (17 - 20)  SpO2: 97% (15 Dec 2021 08:48) (92% - 98%)    CONSTITUTIONAL: NAD, well-groomed  NECK: + trach collar  RESPIRATORY: Normal respiratory effort; lungs are clear to auscultation bilaterally  CARDIOVASCULAR: normal S1 and S2, tachycardic, no murmur/rub/gallop; No lower extremity edema  ABDOMEN: Nontender to palpation, normoactive bowel sounds, no rebound/guarding; + PEG  MUSCULOSKELETAL:  no clubbing or cyanosis of digits; no joint swelling or tenderness to palpation  PSYCH: sleeping; flat affect  NEURO: doesn't follow commands  SKIN: No rashes; no palpable lesions    LABS:                        8.9    12.53 )-----------( 404      ( 15 Dec 2021 05:24 )             29.3     12-15    154<H>  |  111<H>  |  45<H>  ----------------------------<  112<H>  4.0   |  30  |  1.02    Ca    9.1      15 Dec 2021 05:24    TPro  6.3  /  Alb  3.2<L>  /  TBili  0.2  /  DBili  x   /  AST  20  /  ALT  24  /  AlkPhos  113  12-14          Urinalysis Basic - ( 13 Dec 2021 23:33 )    Color: Yellow / Appearance: Slightly Turbid / S.021 / pH: x  Gluc: x / Ketone: Negative  / Bili: Negative / Urobili: Negative   Blood: x / Protein: 30 mg/dL / Nitrite: Negative   Leuk Esterase: Negative / RBC: 10 /hpf / WBC 3 /HPF   Sq Epi: x / Non Sq Epi: 2 /hpf / Bacteria: Negative        Culture - Sputum (collected 14 Dec 2021 08:59)  Source: .Sputum Sputum  Gram Stain (14 Dec 2021 14:21):    Numerous polymorphonuclear leukocytes seen per low power field    Moderate Squamous epithelial cells seen per low power field    Numerous Gram positive cocci in pairs, chains and clusters seen per oil    power field    Moderate Gram Positive Rods seen per oil power field    Moderate Gram Negative Rods seen per oil power field  Preliminary Report (15 Dec 2021 11:20):    Normal Respiratory Susan present    Culture - Urine (collected 14 Dec 2021 06:49)  Source: Clean Catch Clean Catch (Midstream)  Final Report (15 Dec 2021 07:02):    No growth    Culture - Blood (collected 14 Dec 2021 02:12)  Source: .Blood Blood  Preliminary Report (15 Dec 2021 03:02):    No growth to date.    Culture - Blood (collected 14 Dec 2021 02:11)  Source: .Blood Blood  Preliminary Report (15 Dec 2021 03:02):    No growth to date.        RADIOLOGY & ADDITIONAL TESTS:  Results Reviewed:     < from: Xray Chest 1 View- PORTABLE-Urgent (Xray Chest 1 View- PORTABLE-Urgent .) (. @ 22:38) >  IMPRESSION: Clearlungs.    < end of copied text >    Imaging Personally Reviewed:  Electrocardiogram Personally Reviewed:    COORDINATION OF CARE:  Care Discussed with Consultants/Other Providers [Y/N]: neurosurgery PA(Ciara)  Prior or Outpatient Records Reviewed [Y/N]:

## 2021-12-15 NOTE — PROGRESS NOTE ADULT - ASSESSMENT
61 M S/P trach change from a #6 shiley cuffed to a #7 portex uncuffed due to partial trach dislodgement. pt is tolerating trach collar. O2 Sat 96%   61 M S/P trach change from a #6 shiley cuffed to a #7 portex uncuffed due to trach dislodgement. pt is tolerating trach collar. O2 Sat 96%

## 2021-12-15 NOTE — PROGRESS NOTE ADULT - ASSESSMENT
61 year old man with respiratory failure d/t ICH    1. ICH  -per neurosurgery,  -s/p  shunt    2. Respiratory failure  -for tracheostomy, will require long term enteral nutrition  -s/p PEG, tolerating feeds  - aspiration precautions     3. Enterobacter PNA  -s/p course of antibiotics     4. afib with RVR    5. Anemia, no overt GI bleed. EGD unremarkable.   -12/14: hgb dropped to 6.9 last night; received 1 unit of PRBCs- hgb this morning increased to 8.2  -12/15: Hgb 8.9 today  -trend CBC  -continue PPI  -monitor for GI bleeding as apixaban has been resumed     6. Cdiff infection   -s/p abx course  -PO vanco discontinued   -no additional episodes of diarrhea after yesterday  -consider adding banatrol to feeds if diarrhea resumes     7. DVT on a/c  -apixaban 5mg resumed  -continue PPI     8. fever of 101.3F on 12/13  -BC and RVP negative/no growth  -ID following     Attending supervision statement: I have personally seen and examined the patient. I fully participated in the care of this patient. I have made amendments to the documentation where necessary, and agree with the history, physical exam, and plan as outlined by the ACP.      [facility]  Gastroenterology and Hepatology  [address] [place], [place]  Office: [phone]  Cell: [phone]

## 2021-12-15 NOTE — PROGRESS NOTE ADULT - ATTENDING COMMENTS
trach dislodged from true stoma/ track and was into blind pouch on trach removal. true track stenotic and unable to navigate with obturator, was bale to find our way in under direct visualization with the scope and pass the new trach. was in good position and clear to david   trach secured trach dislodged from true stoma/ track and was into blind pouch on trach removal. true track stenotic and unable to navigate with obturator, was bale to find our way in under direct visualization with the scope and pass the new trach. was in good position and clear to david   trach secured  no crepitous

## 2021-12-16 LAB
ANION GAP SERPL CALC-SCNC: 13 MMOL/L — SIGNIFICANT CHANGE UP (ref 5–17)
BUN SERPL-MCNC: 60 MG/DL — HIGH (ref 7–23)
CALCIUM SERPL-MCNC: 9.1 MG/DL — SIGNIFICANT CHANGE UP (ref 8.4–10.5)
CHLORIDE SERPL-SCNC: 109 MMOL/L — HIGH (ref 96–108)
CO2 SERPL-SCNC: 27 MMOL/L — SIGNIFICANT CHANGE UP (ref 22–31)
CREAT SERPL-MCNC: 1.04 MG/DL — SIGNIFICANT CHANGE UP (ref 0.5–1.3)
CULTURE RESULTS: SIGNIFICANT CHANGE UP
GLUCOSE SERPL-MCNC: 186 MG/DL — HIGH (ref 70–99)
POTASSIUM SERPL-MCNC: 4.1 MMOL/L — SIGNIFICANT CHANGE UP (ref 3.5–5.3)
POTASSIUM SERPL-SCNC: 4.1 MMOL/L — SIGNIFICANT CHANGE UP (ref 3.5–5.3)
SODIUM SERPL-SCNC: 149 MMOL/L — HIGH (ref 135–145)
SPECIMEN SOURCE: SIGNIFICANT CHANGE UP

## 2021-12-16 PROCEDURE — 74177 CT ABD & PELVIS W/CONTRAST: CPT | Mod: 26

## 2021-12-16 PROCEDURE — 71260 CT THORAX DX C+: CPT | Mod: 26

## 2021-12-16 PROCEDURE — 99233 SBSQ HOSP IP/OBS HIGH 50: CPT

## 2021-12-16 RX ADMIN — Medication 4 MILLILITER(S): at 12:35

## 2021-12-16 RX ADMIN — ATORVASTATIN CALCIUM 40 MILLIGRAM(S): 80 TABLET, FILM COATED ORAL at 22:47

## 2021-12-16 RX ADMIN — Medication 1 DROP(S): at 12:33

## 2021-12-16 RX ADMIN — Medication 4 MILLILITER(S): at 23:12

## 2021-12-16 RX ADMIN — Medication 1 DROP(S): at 17:17

## 2021-12-16 RX ADMIN — Medication 12.5 MILLIGRAM(S): at 17:29

## 2021-12-16 RX ADMIN — Medication 4 MILLILITER(S): at 05:46

## 2021-12-16 RX ADMIN — CHLORHEXIDINE GLUCONATE 15 MILLILITER(S): 213 SOLUTION TOPICAL at 05:47

## 2021-12-16 RX ADMIN — CHLORHEXIDINE GLUCONATE 15 MILLILITER(S): 213 SOLUTION TOPICAL at 17:19

## 2021-12-16 RX ADMIN — Medication 4: at 05:47

## 2021-12-16 RX ADMIN — Medication 12.5 MILLIGRAM(S): at 05:48

## 2021-12-16 RX ADMIN — Medication 4 MILLILITER(S): at 17:18

## 2021-12-16 RX ADMIN — APIXABAN 5 MILLIGRAM(S): 2.5 TABLET, FILM COATED ORAL at 17:29

## 2021-12-16 RX ADMIN — Medication 2 MILLIGRAM(S): at 05:48

## 2021-12-16 RX ADMIN — Medication 1 TABLET(S): at 12:35

## 2021-12-16 RX ADMIN — Medication 3 MILLILITER(S): at 05:45

## 2021-12-16 RX ADMIN — NYSTATIN CREAM 1 APPLICATION(S): 100000 CREAM TOPICAL at 17:30

## 2021-12-16 RX ADMIN — Medication 1 DROP(S): at 05:47

## 2021-12-16 RX ADMIN — NYSTATIN CREAM 1 APPLICATION(S): 100000 CREAM TOPICAL at 05:48

## 2021-12-16 RX ADMIN — SODIUM CHLORIDE 3 MILLILITER(S): 9 INJECTION INTRAMUSCULAR; INTRAVENOUS; SUBCUTANEOUS at 12:34

## 2021-12-16 RX ADMIN — Medication 1 DROP(S): at 23:09

## 2021-12-16 RX ADMIN — HUMAN INSULIN 12 UNIT(S): 100 INJECTION, SUSPENSION SUBCUTANEOUS at 17:19

## 2021-12-16 RX ADMIN — SODIUM CHLORIDE 3 MILLILITER(S): 9 INJECTION INTRAMUSCULAR; INTRAVENOUS; SUBCUTANEOUS at 05:46

## 2021-12-16 RX ADMIN — HUMAN INSULIN 12 UNIT(S): 100 INJECTION, SUSPENSION SUBCUTANEOUS at 12:33

## 2021-12-16 RX ADMIN — Medication 3 MILLILITER(S): at 17:18

## 2021-12-16 RX ADMIN — Medication 2: at 12:33

## 2021-12-16 RX ADMIN — HUMAN INSULIN 12 UNIT(S): 100 INJECTION, SUSPENSION SUBCUTANEOUS at 05:46

## 2021-12-16 RX ADMIN — Medication 2: at 17:59

## 2021-12-16 RX ADMIN — PANTOPRAZOLE SODIUM 40 MILLIGRAM(S): 20 TABLET, DELAYED RELEASE ORAL at 12:35

## 2021-12-16 RX ADMIN — SODIUM CHLORIDE 3 MILLILITER(S): 9 INJECTION INTRAMUSCULAR; INTRAVENOUS; SUBCUTANEOUS at 17:18

## 2021-12-16 RX ADMIN — SODIUM CHLORIDE 3 MILLILITER(S): 9 INJECTION INTRAMUSCULAR; INTRAVENOUS; SUBCUTANEOUS at 23:20

## 2021-12-16 RX ADMIN — Medication 3 MILLILITER(S): at 23:11

## 2021-12-16 RX ADMIN — Medication 3 MILLILITER(S): at 12:34

## 2021-12-16 RX ADMIN — APIXABAN 5 MILLIGRAM(S): 2.5 TABLET, FILM COATED ORAL at 05:48

## 2021-12-16 NOTE — PROGRESS NOTE ADULT - SUBJECTIVE AND OBJECTIVE BOX
Chief Complaint:  Patient is a 61y old  Male who presents with a chief complaint of IVH/SAH (15 Dec 2021 14:13)      Date of service 12-16-21 @ 12:54      Interval Events:   No acute overnight events. No diarrhea, brbpr, melena, abdominal distention. Normal brown BM evening of 12/15.   H/H stable     Hospital Medications:  acetaminophen    Suspension .. 650 milliGRAM(s) Oral every 6 hours PRN  acetaminophen   IVPB .. 1000 milliGRAM(s) IV Intermittent once  acetylcysteine 20%  Inhalation 4 milliLiter(s) Inhalation every 6 hours  albuterol/ipratropium for Nebulization 3 milliLiter(s) Nebulizer every 6 hours  apixaban 5 milliGRAM(s) Oral two times a day  artificial  tears Solution 1 Drop(s) Both EYES every 6 hours  atorvastatin 40 milliGRAM(s) Oral at bedtime  chlorhexidine 0.12% Liquid 15 milliLiter(s) Oral Mucosa two times a day  dextrose 5% + sodium chloride 0.45%. 1000 milliLiter(s) IV Continuous <Continuous>  dextrose 50% Injectable 25 Gram(s) IV Push once  dextrose 50% Injectable 12.5 Gram(s) IV Push once  doxazosin 2 milliGRAM(s) Oral daily  insulin lispro (ADMELOG) corrective regimen sliding scale   SubCutaneous every 6 hours  insulin NPH human recombinant 12 Unit(s) SubCutaneous every 6 hours  metoprolol tartrate 12.5 milliGRAM(s) Oral two times a day  multivitamin 1 Tablet(s) Oral daily  nystatin Powder 1 Application(s) Topical two times a day  pantoprazole   Suspension 40 milliGRAM(s) Oral daily  sodium chloride 0.9% Bolus 500 milliLiter(s) IV Bolus once  sodium chloride 3%  Inhalation 3 milliLiter(s) Inhalation every 6 hours        Review of Systems:  unable to obtain    PHYSICAL EXAM:   Vital Signs:  Vital Signs Last 24 Hrs  T(C): 36.8 (16 Dec 2021 12:13), Max: 36.8 (15 Dec 2021 16:02)  T(F): 98.2 (16 Dec 2021 12:13), Max: 98.2 (15 Dec 2021 16:02)  HR: 105 (16 Dec 2021 12:13) (99 - 114)  BP: 103/67 (16 Dec 2021 12:13) (103/67 - 123/79)  BP(mean): --  RR: 16 (16 Dec 2021 12:13) (16 - 24)  SpO2: 97% (16 Dec 2021 12:13) (95% - 98%)  Daily     Daily       PHYSICAL EXAM:     GENERAL:  Appears stated age, well-groomed, well-nourished, no distress  HEENT:  NC/AT,  conjunctivae anicteric, clear and pink,   NECK: supple, trachea midline  CHEST:  Full & symmetric excursion, no increased effort, breath sounds clear  HEART:  Regular rhythm, no JVD  ABDOMEN:  Soft, non-tender, non-distended, normoactive bowel sounds,  no masses , no hepatosplenomegaly, +peg  EXTREMITIES:  no cyanosis,clubbing or edema  SKIN:  No rash, erythema, or, ecchymoses, no jaundice  NEURO:  Alert, non-focal, no asterixis  PSYCH: Appropriate affect, oriented to place and time  RECTAL: Deferred      LABS Personally reviewed by me:                        8.9    12.53 )-----------( 404      ( 15 Dec 2021 05:24 )             29.3       12-16    149<H>  |  109<H>  |  60<H>  ----------------------------<  186<H>  4.1   |  27  |  1.04    Ca    9.1      16 Dec 2021 00:30                                    8.9    12.53 )-----------( 404      ( 15 Dec 2021 05:24 )             29.3                         8.2    13.14 )-----------( 419      ( 14 Dec 2021 06:01 )             26.9                         6.9    12.02 )-----------( 480      ( 13 Dec 2021 22:38 )             22.6       Imaging personally reviewed by me:

## 2021-12-16 NOTE — PROGRESS NOTE ADULT - SUBJECTIVE AND OBJECTIVE BOX
SUBJECTIVE:     OVERNIGHT EVENTS: none    Vital Signs Last 24 Hrs  T(C): 36.8 (16 Dec 2021 12:13), Max: 36.8 (16 Dec 2021 04:19)  T(F): 98.2 (16 Dec 2021 12:13), Max: 98.2 (16 Dec 2021 04:19)  HR: 105 (16 Dec 2021 12:13) (99 - 114)  BP: 103/67 (16 Dec 2021 12:13) (103/67 - 123/79)  BP(mean): --  RR: 16 (16 Dec 2021 12:13) (16 - 24)  SpO2: 97% (16 Dec 2021 12:13) (95% - 98%)    PHYSICAL EXAM:      Neurological: opens eyes, did not follow any commands     Pulmonary: scattered rhonchi trach #7 cuffless trach . O2 sats 95< on trach collar  Decreased suctioning requirement     Cardiovascular: S1, S2, Regular Rate and Rhythm     Gastrointestinal: Soft, Nontender, Nondistended     Incision: healed incision    LABS:                        8.9    12.53 )-----------( 404      ( 15 Dec 2021 05:24 )             29.3    12-16    149<H>  |  109<H>  |  60<H>  ----------------------------<  186<H>  4.1   |  27  |  1.04    Ca    9.1      16 Dec 2021 00:30      IMAGING:         MEDICATIONS:    acetaminophen    Suspension .. 650 milliGRAM(s) Oral every 6 hours PRN Temp greater or equal to 38C (100.4F), Mild Pain (1 - 3)  doxazosin 2 milliGRAM(s) Oral daily  metoprolol tartrate 12.5 milliGRAM(s) Oral two times a day  acetylcysteine 20%  Inhalation 4 milliLiter(s) Inhalation every 6 hours  albuterol/ipratropium for Nebulization 3 milliLiter(s) Nebulizer every 6 hours  sodium chloride 3%  Inhalation 3 milliLiter(s) Inhalation every 6 hours  pantoprazole   Suspension 40 milliGRAM(s) Oral daily  apixaban 5 milliGRAM(s) Oral two times a day  artificial  tears Solution 1 Drop(s) Both EYES every 6 hours  atorvastatin 40 milliGRAM(s) Oral at bedtime  chlorhexidine 0.12% Liquid 15 milliLiter(s) Oral Mucosa two times a day  dextrose 5% + sodium chloride 0.45%. 1000 milliLiter(s) IV Continuous <Continuous>  dextrose 50% Injectable 25 Gram(s) IV Push once  dextrose 50% Injectable 12.5 Gram(s) IV Push once  insulin lispro (ADMELOG) corrective regimen sliding scale   SubCutaneous every 6 hours  insulin NPH human recombinant 12 Unit(s) SubCutaneous every 6 hours  multivitamin 1 Tablet(s) Oral daily  nystatin Powder 1 Application(s) Topical two times a day      DIET:

## 2021-12-16 NOTE — PROGRESS NOTE ADULT - PROBLEM SELECTOR PLAN 5
s/p trach on trach collar  trach secured per ENT on 12/15  - continue trach care and suctioning  - monitor O2 sats  - continue nebs. change to xopenex given tachycardia.

## 2021-12-16 NOTE — PROGRESS NOTE ADULT - ASSESSMENT
62 yo male with PMH of HTN, CAD s/p stent on DAPT, T2DM who complained of headache at work at approximately ~8:30, starting feeling dizzy and vomiting, HTN/keke when EMS got to him. SBP 220s, HR 50s. On EMS arrival at 09:39AM 11/4/21, patient seen talking a little, garbling, moving all extremities, then quickly became unresponsive. No known blood thinners. CTH showed large posterior fossa bleed w/ IVH/SAH/hydro. Pre/intubation exam: no eyes open, pupils 2b/l, + corneals/cough/gag, not FC, LUE spont fingers moving, flaccid otherwise. 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 EVD clamped and removed. NSCU course complicated by respiratory failure and dysphagia now s/p trach and PEG on 11/19.   S/p VPS placement Certas @ 4.0  on 11/23. Completed course of cefepime for enterobacter and pseudomonas pneumonia on 11/21. Transferred out of NSCU on 11/25. Began to spike fevers overnight with watery output from rectal tube. Pan cx 11/26. Cdiff +. Completed vanco po CT head 11/26 stable vent/ stable edema & heme post fossa . Tx to  NSCU 12/7 CTH  showed moderate ventricular enlargement , unable to tap shunt  concern for VPS malfunction and pseudomeningocele tap, wo concern for ventriculitis CTH 12/9 stable, no OR plans, VPS@4 Persistent fevers Afebrile since 12/14. CT A/P wo concern for infectious process. Trach changed 12/15 to #7 cuffless due to dislodgement    Plan      Neuro stable  B/L DVT- Continue Eliquis   Fevers- Continue to monitor . Afebrile since 12/14  Trach care/ tube feeds Supportive care  Patient DNR  If remains afebrile discharge to  [facility]

## 2021-12-16 NOTE — PROGRESS NOTE ADULT - SUBJECTIVE AND OBJECTIVE BOX
CC: f/u for fever    Patient  is non verbal. He is s/p drainage of ICH with requirement for VPS.    REVIEW OF SYSTEMS:  All other review of systems negative (Comprehensive ROS)    Antimicrobials Day #  :    Other Medications Reviewed  Home Medications:  aspirin 81 mg oral tablet: 1 tab(s) orally once a day (26 Nov 2021 15:57)  atorvastatin 80 mg oral tablet: 1 tab(s) orally once a day (at bedtime) (26 Nov 2021 15:57)  benazepril 20 mg oral tablet: 1 tab(s) orally once a day (26 Nov 2021 15:57)  chlorthalidone 25 mg oral tablet: 1 tab(s) orally once a day (26 Nov 2021 15:57)  metFORMIN 1000 mg oral tablet: 1 tab(s) orally 2 times a day (26 Nov 2021 15:57)  metoprolol succinate 25 mg oral tablet, extended release: 1 tab(s) orally once a day (26 Nov 2021 15:57)  pioglitazone 30 mg oral tablet: 1 tab(s) orally once a day (26 Nov 2021 15:57)  Plavix 75 mg oral tablet: 1 tab(s) orally once a day (26 Nov 2021 15:57)  Trulicity Pen 0.75 mg/0.5 mL subcutaneous solution: 1 application subcutaneous every 7 days (26 Nov 2021 15:57)    Vital Signs Last 24 Hrs  T(C): 36.8 (16 Dec 2021 12:13), Max: 36.8 (15 Dec 2021 16:02)  T(F): 98.2 (16 Dec 2021 12:13), Max: 98.2 (15 Dec 2021 16:02)  HR: 105 (16 Dec 2021 12:13) (99 - 114)  BP: 103/67 (16 Dec 2021 12:13) (103/67 - 123/79)  BP(mean): --  RR: 16 (16 Dec 2021 12:13) (16 - 24)  SpO2: 97% (16 Dec 2021 12:13) (95% - 98%)    PHYSICAL EXAM:  General: lethargic, no acute distress  Eyes:  anicteric, no conjunctival injection, no discharge  Oropharynx: no lesions or injection 	  Neck: supple, trach  Lungs: scattered ronchi  Heart: regular rate and rhythm; no murmur, rubs or gallops  Abdomen: soft, nondistended, nontender, without mass or organomegaly, peg in place  Skin: no lesions  Extremities: no clubbing, cyanosis, or edema  Neurologic: poorly interactive    LAB RESULTS:                            8.9    12.53 )-----------( 404      ( 15 Dec 2021 05:24 )             29.3     12-16    149<H>  |  109<H>  |  60<H>  ----------------------------<  186<H>  4.1   |  27  |  1.04    Ca    9.1      16 Dec 2021 00:30          MICROBIOLOGY:  RECENT CULTURES:  12-14 @ 08:59 .Sputum Sputum     Normal Respiratory Susan present    Numerous polymorphonuclear leukocytes seen per low power field  Moderate Squamous epithelial cells seen per low power field  Numerous Gram positive cocci in pairs, chains and clusters seen per oil  power field  Moderate Gram Positive Rods seen per oil power field  Moderate Gram Negative Rods seen per oil power field    12-14 @ 06:49 Clean Catch Clean Catch (Midstream)     No growth      12-14 @ 02:12 .Blood Blood     No growth to date.      12-14 @ 02:11 .Blood Blood     No growth to date.          RADIOLOGY REVIEWED:  < from: Xray Chest 1 View- PORTABLE-Urgent (Xray Chest 1 View- PORTABLE-Urgent .) (12.13.21 @ 22:38) >  IMPRESSION: Clearlungs.    < end of copied text >  < from: CT Head No Cont (12.12.21 @ 15:38) >  INTERPRETATION:  CLINICAL INFORMATION: Altered mental status, evaluate   for hydrocephalus    TECHNIQUE: Noncontrast axial CT images were acquired through the head.   Two-dimensional sagittal and coronal reformats were generated.    COMPARISON STUDY: CT head 12/9/2012    FINDINGS:    Right parietal ventriculoperitoneal shunt catheter tip remains in the   right lateral ventricle. The ventricles are unchanged in size since   12/9/2021, with a slitlike right ventricle.    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    There is age-related cerebral volume loss. There is moderate patchy white   matter hypoattenuation which is nonspecific in etiology but likely   related to chronic microvascular changes. A postsurgical changes within   the left cerebellum. A persistent linear hyperdense material are noted   within the midline may represent hemorrhage or postsurgical material.   This appears similar to prior studies. No no no    The visualized paranasal sinuses are clear. The mastoid air cells and   middle ear cavities are clear.    The globes are unremarkable.    The soft tissues of the scalp are unremarkable. The calvarium is intact.    IMPRESSION:    Similar ventricular size when compared to 12/9/2021.    --- End of Report ---    < end of copied text >

## 2021-12-16 NOTE — PROGRESS NOTE ADULT - ASSESSMENT
62 yo male with PMH of HTN   Admitted on 11/4 with severe headache and found to have cerebellar bleed.  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  S/p trach and peg.   He has distal DVT's - on apixaban.  He received cefepime 11/4-11/21 for respiratory coverage.  Developed watery C diff diarrhea on 11/26 - started on oral vancomycin   Then restarted on cefepime on 11/28 for concerns of pneumonia since had low grade temps. Giorgio neb added later  But CXR's remained clear.  Ct of brain showed residual blood.  Pseudomonas in sputum found to be resistant to carbapenems & quinolones. Sputum isolated strep pneumoniae as well    11/28 urine and blood cultures are negative.  Cefepime completed on 12/06 & Giorgio nebs on 12/07/21  Failed TOV & teixeira was replaced for retention of 1000 ml of urine on 12/06/21 PM  Spiked a fever to 102F ? in response to retention  UA with 3 WBCs, no nit or LE, large blood   CXR with clear lungs  CTH revealed a Pseudomeningocele, s/p tap with gram stain without organism  CSF finalized as no growth  Blood & urine cx also finalized as no growth  Completed treatment for C diff w 2 wks of oral vanco on 12/10/21  Diarrhea resolved  Now with fever x 2-3 days, no clinical evidence of C Diff relapse, UA bland, and CXR is clear.  Repeat blood cultures and urine culture negative, sputum with MURF.  Source of fevers not clear   ? Small aspirations, ? occult hematoma  CT results reviewed no mention of an infectious process   blood cx no growth   urine cx no growth  respiratory cx reports normal bindu     Suggest:  continue with to monitor off antibiotics , culture continue to be held in lab for further incubation   if he spikes another fever, then would consider starting empiric Cefepime 2 g IV q12

## 2021-12-16 NOTE — PROGRESS NOTE ADULT - PROBLEM SELECTOR PLAN 10
on eliquis  monitor BM- last BM 12/15    continue free water 300cc q4h for hypernatremia - confirmed with nursing it is being given  s/p D5 +1/2 NS IVF with improvement in hypernatremia  Monitor BMP    Eventual cystoscopy (perhaps as outpatient) for further evaluation of a 2.4 cm nodular soft tissue density in the bladder concerning for bladder lesion as seen on CT a/p.

## 2021-12-16 NOTE — PROGRESS NOTE ADULT - ASSESSMENT
61 year old man with respiratory failure d/t ICH    1. ICH  -per neurosurgery,  -s/p  shunt    2. Respiratory failure  -for tracheostomy, will require long term enteral nutrition  -s/p PEG, tolerating feeds  - aspiration precautions     3. Enterobacter PNA  -s/p course of antibiotics     4. afib with RVR    5. Anemia, no overt GI bleed. EGD unremarkable.   -12/14: hgb dropped to 6.9 last night; received 1 unit of PRBCs- hgb this morning increased to 8.2  -12/15: Hgb 8.9 today  -trend CBC  -continue PPI  -monitor for GI bleeding as apixaban has been resumed     6. Cdiff infection   -s/p abx course  -PO vanco discontinued   -no additional episodes of diarrhea. Last BM 12/15; soft and brown  -consider adding banatrol to feeds if diarrhea resumes     7. DVT on a/c  -apixaban 5mg resumed  -continue PPI     8. fever of 101.3F on 12/13  -BC and RVP negative/no growth  -ID following     Attending supervision statement: I have personally seen and examined the patient. I fully participated in the care of this patient. I have made amendments to the documentation where necessary, and agree with the history, physical exam, and plan as outlined by the ACP.      [facility]  Gastroenterology and Hepatology  [address] [place], [place]  Office: [phone]  Cell: [phone]

## 2021-12-16 NOTE — PROGRESS NOTE ADULT - PROBLEM SELECTOR PLAN 1
afebrile since 12/6 but recurrent fevers starting 12/13, remains afebrile last 24-48hrs  ? related to episodes of microaspiration  Blood/urine/CSF cultures from 12/6-7 negative to date  repeat UA/Ucx negative  CXR clear  COVID PCR and RVP neg  sputum cx with normal resp bindu  repeat Bcx neg  No evidence of PNA on CT   CT c/a/p noted: A 2.4 cm nodular soft tissue density in the bladder appears separate from the prostate gland, concerning for bladder lesion. A 2.7 cm fluid collection adjacent to the right ischial tuberosity, may be related to ischiogluteal bursitis. Low clinical suspicion that these are contributing to his fevers.  ID follow up appreciated  monitor off abx for now but if recurrent fever, consider empiric treatment with IV cefepime 2g q12h

## 2021-12-16 NOTE — PROGRESS NOTE ADULT - PROBLEM SELECTOR PLAN 6
right soleal vein DVT and persistent left posterior tibial, peroneal, gastrocnemius and soleal vein DVT without proximal propagation on last duplex on 11/24  - started on Eliquis 12/2--> monitor for bleed

## 2021-12-16 NOTE — PROGRESS NOTE ADULT - SUBJECTIVE AND OBJECTIVE BOX
[facility] Division of Hospital Medicine  Ros Mcgregor MD  spectra [id number]    Patient is a 61y old  Male who presents with a chief complaint of IVH/SAH (15 Dec 2021 14:13)      SUBJECTIVE / OVERNIGHT EVENTS: unable to obtain ROS from the patient due to mental status. s/p RRT yesterday as nursing and RT couldn't pass suction catheter through trach tube and patient had an episode of desaturation prompting RRT but it was cancelled shortly thereafter as O2 Sat went back to normal.    ADDITIONAL REVIEW OF SYSTEMS:    MEDICATIONS  (STANDING):  acetaminophen   IVPB .. 1000 milliGRAM(s) IV Intermittent once  acetylcysteine 20%  Inhalation 4 milliLiter(s) Inhalation every 6 hours  albuterol/ipratropium for Nebulization 3 milliLiter(s) Nebulizer every 6 hours  apixaban 5 milliGRAM(s) Oral two times a day  artificial  tears Solution 1 Drop(s) Both EYES every 6 hours  atorvastatin 40 milliGRAM(s) Oral at bedtime  chlorhexidine 0.12% Liquid 15 milliLiter(s) Oral Mucosa two times a day  dextrose 5% + sodium chloride 0.45%. 1000 milliLiter(s) (75 mL/Hr) IV Continuous <Continuous>  dextrose 50% Injectable 25 Gram(s) IV Push once  dextrose 50% Injectable 12.5 Gram(s) IV Push once  doxazosin 2 milliGRAM(s) Oral daily  insulin lispro (ADMELOG) corrective regimen sliding scale   SubCutaneous every 6 hours  insulin NPH human recombinant 12 Unit(s) SubCutaneous every 6 hours  metoprolol tartrate 12.5 milliGRAM(s) Oral two times a day  multivitamin 1 Tablet(s) Oral daily  nystatin Powder 1 Application(s) Topical two times a day  pantoprazole   Suspension 40 milliGRAM(s) Oral daily  sodium chloride 3%  Inhalation 3 milliLiter(s) Inhalation every 6 hours    MEDICATIONS  (PRN):  acetaminophen    Suspension .. 650 milliGRAM(s) Oral every 6 hours PRN Temp greater or equal to 38C (100.4F), Mild Pain (1 - 3)      CAPILLARY BLOOD GLUCOSE      POCT Blood Glucose.: 170 mg/dL (16 Dec 2021 11:49)  POCT Blood Glucose.: 212 mg/dL (16 Dec 2021 05:38)  POCT Blood Glucose.: 194 mg/dL (15 Dec 2021 23:47)  POCT Blood Glucose.: 162 mg/dL (15 Dec 2021 19:25)  POCT Blood Glucose.: 161 mg/dL (15 Dec 2021 16:30)    I&O's Summary    15 Dec 2021 07:01  -  16 Dec 2021 07:00  --------------------------------------------------------  IN: 300 mL / OUT: 1950 mL / NET: -1650 mL        PHYSICAL EXAM:  Vital Signs Last 24 Hrs  T(C): 36.8 (16 Dec 2021 12:13), Max: 36.8 (15 Dec 2021 16:02)  T(F): 98.2 (16 Dec 2021 12:13), Max: 98.2 (15 Dec 2021 16:02)  HR: 105 (16 Dec 2021 12:13) (99 - 114)  BP: 103/67 (16 Dec 2021 12:13) (103/67 - 123/79)  BP(mean): --  RR: 16 (16 Dec 2021 12:13) (16 - 24)  SpO2: 97% (16 Dec 2021 12:13) (95% - 98%)    CONSTITUTIONAL: NAD, well-groomed  NECK: + trach collar  RESPIRATORY: Normal respiratory effort; scattered rhonchi  CARDIOVASCULAR: normal S1 and S2, tachycardic, no murmur/rub/gallop; No lower extremity edema  ABDOMEN: Nontender to palpation, normoactive bowel sounds, no rebound/guarding; + PEG  MUSCULOSKELETAL:  no clubbing or cyanosis of digits; no joint swelling or tenderness to palpation  PSYCH: sleeping; flat affect  NEURO: doesn't follow commands  SKIN: No rashes; no palpable lesions    LABS:                        8.9    12.53 )-----------( 404      ( 15 Dec 2021 05:24 )             29.3     12-16    149<H>  |  109<H>  |  60<H>  ----------------------------<  186<H>  4.1   |  27  |  1.04    Ca    9.1      16 Dec 2021 00:30                Culture - Sputum (collected 14 Dec 2021 08:59)  Source: .Sputum Sputum  Gram Stain (14 Dec 2021 14:21):    Numerous polymorphonuclear leukocytes seen per low power field    Moderate Squamous epithelial cells seen per low power field    Numerous Gram positive cocci in pairs, chains and clusters seen per oil    power field    Moderate Gram Positive Rods seen per oil power field    Moderate Gram Negative Rods seen per oil power field  Final Report (16 Dec 2021 10:13):    Normal Respiratory Susan present    Culture - Urine (collected 14 Dec 2021 06:49)  Source: Clean Catch Clean Catch (Midstream)  Final Report (15 Dec 2021 07:02):    No growth    Culture - Blood (collected 14 Dec 2021 02:12)  Source: .Blood Blood  Preliminary Report (15 Dec 2021 03:02):    No growth to date.    Culture - Blood (collected 14 Dec 2021 02:11)  Source: .Blood Blood  Preliminary Report (15 Dec 2021 03:02):    No growth to date.        RADIOLOGY & ADDITIONAL TESTS:  Results Reviewed:     < from: CT Chest w/ IV Cont (12.16.21 @ 09:25) >  IMPRESSION:  A 2.4 cm nodular soft tissue density in the bladder appears separate from   the prostate gland, concerning for bladder lesion. Correlate with   cystoscopy.    A 2.7 cm fluid collection adjacent to the right ischial tuberosity, may   be related to ischiogluteal bursitis.    Indeterminant 1.7 cm left renal lesion. Contrast-enhanced MRI can be   performed further evaluation.    Mild fatty infiltration along the PEG tube catheter without drainable   fluid collection.    < end of copied text >    Imaging Personally Reviewed:  Electrocardiogram Personally Reviewed:    COORDINATION OF CARE:  Care Discussed with Consultants/Other Providers [Y/N]: neurosurgery PA(Vaishali)  Prior or Outpatient Records Reviewed [Y/N]:

## 2021-12-17 PROBLEM — E11.9 TYPE 2 DIABETES MELLITUS WITHOUT COMPLICATIONS: Chronic | Status: ACTIVE | Noted: 2021-11-04

## 2021-12-17 PROBLEM — I10 ESSENTIAL (PRIMARY) HYPERTENSION: Chronic | Status: ACTIVE | Noted: 2021-11-04

## 2021-12-17 LAB
ANION GAP SERPL CALC-SCNC: 11 MMOL/L — SIGNIFICANT CHANGE UP (ref 5–17)
BUN SERPL-MCNC: 58 MG/DL — HIGH (ref 7–23)
CALCIUM SERPL-MCNC: 8.8 MG/DL — SIGNIFICANT CHANGE UP (ref 8.4–10.5)
CHLORIDE SERPL-SCNC: 105 MMOL/L — SIGNIFICANT CHANGE UP (ref 96–108)
CO2 SERPL-SCNC: 26 MMOL/L — SIGNIFICANT CHANGE UP (ref 22–31)
CREAT SERPL-MCNC: 0.9 MG/DL — SIGNIFICANT CHANGE UP (ref 0.5–1.3)
GLUCOSE SERPL-MCNC: 198 MG/DL — HIGH (ref 70–99)
HCT VFR BLD CALC: 27.1 % — LOW (ref 39–50)
HGB BLD-MCNC: 8.2 G/DL — LOW (ref 13–17)
MCHC RBC-ENTMCNC: 29.5 PG — SIGNIFICANT CHANGE UP (ref 27–34)
MCHC RBC-ENTMCNC: 30.3 GM/DL — LOW (ref 32–36)
MCV RBC AUTO: 97.5 FL — SIGNIFICANT CHANGE UP (ref 80–100)
NRBC # BLD: 0 /100 WBCS — SIGNIFICANT CHANGE UP (ref 0–0)
PLATELET # BLD AUTO: 280 K/UL — SIGNIFICANT CHANGE UP (ref 150–400)
POTASSIUM SERPL-MCNC: 5.4 MMOL/L — HIGH (ref 3.5–5.3)
POTASSIUM SERPL-SCNC: 5.4 MMOL/L — HIGH (ref 3.5–5.3)
RBC # BLD: 2.78 M/UL — LOW (ref 4.2–5.8)
RBC # FLD: 13.6 % — SIGNIFICANT CHANGE UP (ref 10.3–14.5)
SODIUM SERPL-SCNC: 142 MMOL/L — SIGNIFICANT CHANGE UP (ref 135–145)
WBC # BLD: 11.74 K/UL — HIGH (ref 3.8–10.5)
WBC # FLD AUTO: 11.74 K/UL — HIGH (ref 3.8–10.5)

## 2021-12-17 PROCEDURE — 99232 SBSQ HOSP IP/OBS MODERATE 35: CPT

## 2021-12-17 PROCEDURE — 99221 1ST HOSP IP/OBS SF/LOW 40: CPT

## 2021-12-17 RX ADMIN — HUMAN INSULIN 12 UNIT(S): 100 INJECTION, SUSPENSION SUBCUTANEOUS at 23:03

## 2021-12-17 RX ADMIN — Medication 4 MILLILITER(S): at 05:50

## 2021-12-17 RX ADMIN — Medication 3 MILLILITER(S): at 18:59

## 2021-12-17 RX ADMIN — SODIUM CHLORIDE 3 MILLILITER(S): 9 INJECTION INTRAMUSCULAR; INTRAVENOUS; SUBCUTANEOUS at 06:33

## 2021-12-17 RX ADMIN — Medication 3 MILLILITER(S): at 11:56

## 2021-12-17 RX ADMIN — Medication 1 DROP(S): at 19:17

## 2021-12-17 RX ADMIN — Medication 4 MILLILITER(S): at 19:19

## 2021-12-17 RX ADMIN — Medication 12.5 MILLIGRAM(S): at 05:53

## 2021-12-17 RX ADMIN — PANTOPRAZOLE SODIUM 40 MILLIGRAM(S): 20 TABLET, DELAYED RELEASE ORAL at 11:55

## 2021-12-17 RX ADMIN — APIXABAN 5 MILLIGRAM(S): 2.5 TABLET, FILM COATED ORAL at 05:53

## 2021-12-17 RX ADMIN — HUMAN INSULIN 12 UNIT(S): 100 INJECTION, SUSPENSION SUBCUTANEOUS at 11:51

## 2021-12-17 RX ADMIN — Medication 12.5 MILLIGRAM(S): at 19:10

## 2021-12-17 RX ADMIN — Medication 2: at 01:22

## 2021-12-17 RX ADMIN — CHLORHEXIDINE GLUCONATE 15 MILLILITER(S): 213 SOLUTION TOPICAL at 05:51

## 2021-12-17 RX ADMIN — NYSTATIN CREAM 1 APPLICATION(S): 100000 CREAM TOPICAL at 05:52

## 2021-12-17 RX ADMIN — HUMAN INSULIN 12 UNIT(S): 100 INJECTION, SUSPENSION SUBCUTANEOUS at 18:59

## 2021-12-17 RX ADMIN — Medication 3 MILLILITER(S): at 05:50

## 2021-12-17 RX ADMIN — Medication 2 MILLIGRAM(S): at 05:52

## 2021-12-17 RX ADMIN — Medication 4 MILLILITER(S): at 12:00

## 2021-12-17 RX ADMIN — HUMAN INSULIN 12 UNIT(S): 100 INJECTION, SUSPENSION SUBCUTANEOUS at 01:13

## 2021-12-17 RX ADMIN — Medication 2: at 23:03

## 2021-12-17 RX ADMIN — Medication 2: at 11:54

## 2021-12-17 RX ADMIN — Medication 3 MILLILITER(S): at 23:05

## 2021-12-17 RX ADMIN — CHLORHEXIDINE GLUCONATE 15 MILLILITER(S): 213 SOLUTION TOPICAL at 19:00

## 2021-12-17 RX ADMIN — Medication 1 TABLET(S): at 11:49

## 2021-12-17 RX ADMIN — APIXABAN 5 MILLIGRAM(S): 2.5 TABLET, FILM COATED ORAL at 19:10

## 2021-12-17 RX ADMIN — ATORVASTATIN CALCIUM 40 MILLIGRAM(S): 80 TABLET, FILM COATED ORAL at 21:36

## 2021-12-17 RX ADMIN — Medication 1 DROP(S): at 11:46

## 2021-12-17 RX ADMIN — Medication 4 MILLILITER(S): at 23:04

## 2021-12-17 RX ADMIN — NYSTATIN CREAM 1 APPLICATION(S): 100000 CREAM TOPICAL at 19:00

## 2021-12-17 RX ADMIN — Medication 1 DROP(S): at 05:47

## 2021-12-17 RX ADMIN — HUMAN INSULIN 12 UNIT(S): 100 INJECTION, SUSPENSION SUBCUTANEOUS at 05:49

## 2021-12-17 RX ADMIN — Medication 1 DROP(S): at 23:04

## 2021-12-17 RX ADMIN — Medication 2: at 05:47

## 2021-12-17 RX ADMIN — SODIUM CHLORIDE 3 MILLILITER(S): 9 INJECTION INTRAMUSCULAR; INTRAVENOUS; SUBCUTANEOUS at 23:04

## 2021-12-17 NOTE — PROGRESS NOTE ADULT - PROBLEM SELECTOR PLAN 5
s/p trach on trach collar  trach secured per ENT on 12/15  - continue trach care and suctioning  - monitor O2 sats  - continue nebs. change to xopenex if tachycardia persists.

## 2021-12-17 NOTE — PROGRESS NOTE ADULT - SUBJECTIVE AND OBJECTIVE BOX
CC: f/u for fever    Patient  is non verbal. He is s/p drainage of ICH with requirement for VPS.    REVIEW OF SYSTEMS:  All other review of systems negative (Comprehensive ROS)    Antimicrobials Day #  :    Other Medications Reviewed  Home Medications:  aspirin 81 mg oral tablet: 1 tab(s) orally once a day (26 Nov 2021 15:57)  atorvastatin 80 mg oral tablet: 1 tab(s) orally once a day (at bedtime) (26 Nov 2021 15:57)  benazepril 20 mg oral tablet: 1 tab(s) orally once a day (26 Nov 2021 15:57)  chlorthalidone 25 mg oral tablet: 1 tab(s) orally once a day (26 Nov 2021 15:57)  metFORMIN 1000 mg oral tablet: 1 tab(s) orally 2 times a day (26 Nov 2021 15:57)  metoprolol succinate 25 mg oral tablet, extended release: 1 tab(s) orally once a day (26 Nov 2021 15:57)  pioglitazone 30 mg oral tablet: 1 tab(s) orally once a day (26 Nov 2021 15:57)  Plavix 75 mg oral tablet: 1 tab(s) orally once a day (26 Nov 2021 15:57)  Trulicity Pen 0.75 mg/0.5 mL subcutaneous solution: 1 application subcutaneous every 7 days (26 Nov 2021 15:57)    Vital Signs Last 24 Hrs  T(C): 36.8 (17 Dec 2021 11:08), Max: 37.1 (17 Dec 2021 00:06)  T(F): 98.2 (17 Dec 2021 11:08), Max: 98.7 (17 Dec 2021 00:06)  HR: 98 (17 Dec 2021 11:08) (89 - 102)  BP: 106/70 (17 Dec 2021 11:08) (103/65 - 118/78)  BP(mean): --  RR: 18 (17 Dec 2021 11:08) (16 - 20)  SpO2: 99% (17 Dec 2021 11:08) (98% - 99%)    PHYSICAL EXAM:  General: lethargic, no acute distress  Eyes:  anicteric, no conjunctival injection, no discharge  Oropharynx: no lesions or injection 	  Neck: supple, trach  Lungs: scattered ronchi  Heart: regular rate and rhythm; no murmur, rubs or gallops  Abdomen: soft, nondistended, nontender, without mass or organomegaly, peg in place  Skin: no lesions  Extremities: no clubbing, cyanosis, or edema  Neurologic: poorly interactive    LAB RESULTS:                                       8.2    11.74 )-----------( 280      ( 17 Dec 2021 07:32 )                 27.1     12-17    142  |  105  |  58<H>  ----------------------------<  198<H>  5.4<H>   |  26  |  0.90    Ca    8.8      17 Dec 2021 05:51                MICROBIOLOGY:  RECENT CULTURES:  12-14 @ 08:59 .Sputum Sputum     Normal Respiratory Susan present    Numerous polymorphonuclear leukocytes seen per low power field  Moderate Squamous epithelial cells seen per low power field  Numerous Gram positive cocci in pairs, chains and clusters seen per oil  power field  Moderate Gram Positive Rods seen per oil power field  Moderate Gram Negative Rods seen per oil power field    12-14 @ 06:49 Clean Catch Clean Catch (Midstream)     No growth      12-14 @ 02:12 .Blood Blood     No growth to date.      12-14 @ 02:11 .Blood Blood     No growth to date.          RADIOLOGY REVIEWED:  < from: Xray Chest 1 View- PORTABLE-Urgent (Xray Chest 1 View- PORTABLE-Urgent .) (12.13.21 @ 22:38) >  IMPRESSION: Clearlungs.    < end of copied text >  < from: CT Head No Cont (12.12.21 @ 15:38) >  INTERPRETATION:  CLINICAL INFORMATION: Altered mental status, evaluate   for hydrocephalus    TECHNIQUE: Noncontrast axial CT images were acquired through the head.   Two-dimensional sagittal and coronal reformats were generated.    COMPARISON STUDY: CT head 12/9/2012    FINDINGS:    Right parietal ventriculoperitoneal shunt catheter tip remains in the   right lateral ventricle. The ventricles are unchanged in size since   12/9/2021, with a slitlike right ventricle.    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    There is age-related cerebral volume loss. There is moderate patchy white   matter hypoattenuation which is nonspecific in etiology but likely   related to chronic microvascular changes. A postsurgical changes within   the left cerebellum. A persistent linear hyperdense material are noted   within the midline may represent hemorrhage or postsurgical material.   This appears similar to prior studies. No no no    The visualized paranasal sinuses are clear. The mastoid air cells and   middle ear cavities are clear.    The globes are unremarkable.    The soft tissues of the scalp are unremarkable. The calvarium is intact.    IMPRESSION:    Similar ventricular size when compared to 12/9/2021.    --- End of Report ---    < end of copied text >

## 2021-12-17 NOTE — PROGRESS NOTE ADULT - ASSESSMENT
61 year old man with respiratory failure d/t ICH    1. ICH  -per neurosurgery,  -s/p  shunt    2. Respiratory failure  -for tracheostomy, will require long term enteral nutrition  -s/p PEG, tolerating feeds, continue use of abdominal binder  - aspiration precautions     3. Enterobacter PNA  -s/p course of antibiotics     4. afib with RVR    5. Anemia, no overt GI bleed. EGD unremarkable.   -12/14: hgb dropped to 6.9 last night; received 1 unit of PRBCs- hgb this morning increased to 8.2  -12/15: Hgb 8.9 today  -trend CBC  -continue PPI  -monitor for GI bleeding as apixaban has been resumed     6. Cdiff infection   -s/p abx course  -PO vanco discontinued   -no additional episodes of diarrhea  -consider adding banatrol to feeds if diarrhea resumes     7. DVT on a/c  -apixaban 5mg resumed  -continue PPI     8. fever of 101.3F on 12/13  -BC and RVP negative/no growth  -ID following   -afebrile since 12/14; if fevers resume then empiric cefepime 2g IV q12hrs recommended by ID.        Attending supervision statement: I have personally seen and examined the patient. I fully participated in the care of this patient. I have made amendments to the documentation where necessary, and agree with the history, physical exam, and plan as outlined by the ACP.      [facility]  Gastroenterology and Hepatology  [address] [place], [place]  Office: [phone]  Cell: [phone]

## 2021-12-17 NOTE — PROGRESS NOTE ADULT - SUBJECTIVE AND OBJECTIVE BOX
SUBJECTIVE:     OVERNIGHT EVENTS: none    Vital Signs Last 24 Hrs  T(C): 36.8 (17 Dec 2021 11:08), Max: 37.1 (17 Dec 2021 00:06)  T(F): 98.2 (17 Dec 2021 11:08), Max: 98.7 (17 Dec 2021 00:06)  HR: 98 (17 Dec 2021 11:08) (89 - 102)  BP: 106/70 (17 Dec 2021 11:08) (103/65 - 118/78)  BP(mean): --  RR: 18 (17 Dec 2021 11:08) (16 - 20)  SpO2: 99% (17 Dec 2021 11:08) (98% - 99%)    PHYSICAL EXAM:    Neurological: opens eyes, able to show thumbs up on left. wiggle toes bilaterally to command     Pulmonary: scattered rhonchi trach #7 cuffless trach . O2 sats 95< on trach collar  Decreased suctioning requirement     Cardiovascular: S1, S2, Regular Rate and Rhythm     Gastrointestinal: Soft, Nontender, Nondistended     Incision: healed incision      LABS:                        8.2    11.74 )-----------( 280      ( 17 Dec 2021 07:32 )             27.1    12-17    142  |  105  |  58<H>  ----------------------------<  198<H>  5.4<H>   |  26  |  0.90    Ca    8.8      17 Dec 2021 05:51          IMAGING:         MEDICATIONS:    acetaminophen    Suspension .. 650 milliGRAM(s) Oral every 6 hours PRN Temp greater or equal to 38C (100.4F), Mild Pain (1 - 3)  acetaminophen   IVPB .. 1000 milliGRAM(s) IV Intermittent once  doxazosin 2 milliGRAM(s) Oral daily  metoprolol tartrate 12.5 milliGRAM(s) Oral two times a day  acetylcysteine 20%  Inhalation 4 milliLiter(s) Inhalation every 6 hours  albuterol/ipratropium for Nebulization 3 milliLiter(s) Nebulizer every 6 hours  sodium chloride 3%  Inhalation 3 milliLiter(s) Inhalation every 6 hours  pantoprazole   Suspension 40 milliGRAM(s) Oral daily  apixaban 5 milliGRAM(s) Oral two times a day  artificial  tears Solution 1 Drop(s) Both EYES every 6 hours  atorvastatin 40 milliGRAM(s) Oral at bedtime  insulin lispro (ADMELOG) corrective regimen sliding scale   SubCutaneous every 6 hours  insulin NPH human recombinant 12 Unit(s) SubCutaneous every 6 hours  multivitamin 1 Tablet(s) Oral daily  nystatin Powder 1 Application(s) Topical two times a day      DIET:

## 2021-12-17 NOTE — CONSULT NOTE ADULT - SUBJECTIVE AND OBJECTIVE BOX
60 yo Male without past  hx here for intracranial bleed requiring numerous intervention. In workup for fever of unknown origin, found with bladder mass. Pt also undergoing [facility] protocol. Pt seen and examined. Unable to add to history. Does not appear to have past urological hx    Allscripts reviewed     PAST MEDICAL & SURGICAL HISTORY:  HTN (hypertension)    Diabetes mellitus        MEDICATIONS  (STANDING):  acetaminophen   IVPB .. 1000 milliGRAM(s) IV Intermittent once  acetylcysteine 20%  Inhalation 4 milliLiter(s) Inhalation every 6 hours  albuterol/ipratropium for Nebulization 3 milliLiter(s) Nebulizer every 6 hours  apixaban 5 milliGRAM(s) Oral two times a day  artificial  tears Solution 1 Drop(s) Both EYES every 6 hours  atorvastatin 40 milliGRAM(s) Oral at bedtime  chlorhexidine 0.12% Liquid 15 milliLiter(s) Oral Mucosa two times a day  dextrose 5% + sodium chloride 0.45%. 1000 milliLiter(s) (75 mL/Hr) IV Continuous <Continuous>  dextrose 50% Injectable 25 Gram(s) IV Push once  dextrose 50% Injectable 12.5 Gram(s) IV Push once  doxazosin 2 milliGRAM(s) Oral daily  insulin lispro (ADMELOG) corrective regimen sliding scale   SubCutaneous every 6 hours  insulin NPH human recombinant 12 Unit(s) SubCutaneous every 6 hours  metoprolol tartrate 12.5 milliGRAM(s) Oral two times a day  multivitamin 1 Tablet(s) Oral daily  nystatin Powder 1 Application(s) Topical two times a day  pantoprazole   Suspension 40 milliGRAM(s) Oral daily  sodium chloride 3%  Inhalation 3 milliLiter(s) Inhalation every 6 hours    MEDICATIONS  (PRN):  acetaminophen    Suspension .. 650 milliGRAM(s) Oral every 6 hours PRN Temp greater or equal to 38C (100.4F), Mild Pain (1 - 3)      FAMILY HISTORY:  unable to assess for familial gu malignancy hx     Allergies    penicillin (Other)    Intolerances    SOCIAL HISTORY:  tobacco hx according to allscripts     REVIEW OF SYSTEMS: Otherwise negative as stated in HPI    Physical Exam  Vital signs  T(C): 36.7 (12-17-21 @ 15:00), Max: 37.1 (12-17-21 @ 00:06)  HR: 95 (12-17-21 @ 15:00)  BP: 115/69 (12-17-21 @ 15:00)  SpO2: 98% (12-17-21 @ 15:00)  Wt(kg): --    Output    UOP NONE AVAILABLE     Gen:  AWAKE ALERT NAD AXOX0    Pulm:  NO RESP DISTRESS; TRACH IN PLACE   	  CV:  S1S2    GI:  OBESE SOFT ND    :  [facility] PHALLUS, BL DESC TESTIS NO MASS  UNABLE TO PERFORM DENISE                             	      LABS:                          8.2    11.74 )-----------( 280      ( 17 Dec 2021 07:32 )             27.1       12-17    142  |  105  |  58<H>  ----------------------------<  198<H>  5.4<H>   |  26  |  0.90    Ca    8.8      17 Dec 2021 05:51        RADIOLOGY:  < from: CT Abdomen and Pelvis w/ IV Cont (12.16.21 @ 09:26) >    FINDINGS:  CHEST:  LUNGS AND LARGE AIRWAYS: Status post tracheostomy. Bibasilar subsegmental   atelectasis.  PLEURA: No pleural effusion.  VESSELS: Coronary artery calcification.  HEART: Heart sizeis normal. No pericardial effusion.  MEDIASTINUM AND ANABELLA: No lymphadenopathy.  CHEST WALL AND LOWER NECK: Partially imaged lesion catheter coursing in   the right anterior subcutaneous tissues.    ABDOMEN AND PELVIS:  LIVER: Within normal limits.  BILE DUCTS: Normal caliber.  GALLBLADDER: Within normal limits.  SPLEEN: Within normal limits.  PANCREAS: Within normal limits.  ADRENALS: Within normal limits.  KIDNEYS/URETERS: Symmetric renal enhancement without hydronephrosis.   Bilateral renal cysts and hypodensities too small to characterize. A 1.7   cm exophytic left upper pole hypoattenuating lesion is indeterminate.    BLADDER: A 2.4 cm nodular soft tissue density along the right posterior   bladder wall appears separate from the prostate gland and not seen on   pelvic CT from 2017.  REPRODUCTIVE ORGANS: Enlarged prostate protruding into the base of   bladder.    BOWEL: PEG tube terminates in the stomach. Mild infiltration along the   PEG tube catheter without drainable fluid collection. No bowel   obstruction. Appendix is not visualized.  PERITONEUM: No ascites.  shunt catheter terminating in the left   hemipelvis.  VESSELS: Within normal limits.  RETROPERITONEUM/LYMPH NODES: No lymphadenopathy. Mild presacral edema.  ABDOMINAL WALL:  shunt catheter coursing in the right anterior   abdominal wall.  BONES: Degenerative changes. A 2.7 x 1.2 cm fluid collection adjacent to   the right ischial tuberosity.    IMPRESSION:  A 2.4 cm nodular soft tissue density in the bladder appears separate from   the prostate gland, concerning for bladder lesion. Correlate with   cystoscopy.    A 2.7 cm fluid collection adjacent to the right ischial tuberosity, may   be related to ischiogluteal bursitis.    Indeterminant 1.7 cm left renal lesion. Contrast-enhanced MRI can be   performed further evaluation.    Mild fatty infiltration along the PEG tube catheter without drainable   fluid collection.    < end of copied text >

## 2021-12-17 NOTE — PROGRESS NOTE ADULT - PROBLEM SELECTOR PLAN 1
afebrile since 12/6 but recurrent fevers starting 12/13, remains afebrile last 48hrs  ? related to episodes of microaspiration  Blood/urine/CSF cultures from 12/6-7 negative to date  repeat UA/Ucx negative  CXR clear  COVID PCR and RVP neg  sputum cx with normal resp bindu  repeat Bcx neg  No evidence of PNA on CT   leukocytosis downtrending on its own  CT c/a/p noted: A 2.4 cm nodular soft tissue density in the bladder appears separate from the prostate gland, concerning for bladder lesion. A 2.7 cm fluid collection adjacent to the right ischial tuberosity, may be related to ischiogluteal bursitis. Low clinical suspicion that these are contributing to his fevers.  ID follow up appreciated  monitor off abx for now but if recurrent fever, consider empiric treatment with IV cefepime 2g q12h

## 2021-12-17 NOTE — PROGRESS NOTE ADULT - ASSESSMENT
60 yo male with PMH of HTN, CAD s/p stent on DAPT, T2DM who complained of headache at work at approximately ~8:30, starting feeling dizzy and vomiting, HTN/keke when EMS got to him. SBP 220s, HR 50s. On EMS arrival at 09:39AM 11/4/21, patient seen talking a little, garbling, moving all extremities, then quickly became unresponsive. No known blood thinners. CTH showed large posterior fossa bleed w/ IVH/SAH/hydro. Pre/intubation exam: no eyes open, pupils 2b/l, + corneals/cough/gag, not FC, LUE spont fingers moving, flaccid otherwise. 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 EVD clamped and removed. NSCU course complicated by respiratory failure and dysphagia now s/p trach and PEG on 11/19.   S/p VPS placement Certas @ 4.0  on 11/23. Completed course of cefepime for enterobacter and pseudomonas pneumonia on 11/21. Transferred out of NSCU on 11/25. Began to spike fevers overnight with watery output from rectal tube. Pan cx 11/26. Cdiff +. Completed vanco po CT head 11/26 stable vent/ stable edema & heme post fossa . Tx to  NSCU 12/7 CTH  showed moderate ventricular enlargement , unable to tap shunt  concern for VPS malfunction and pseudomeningocele tap, wo concern for ventriculitis CTH 12/9 stable, no OR plans, VPS@4 Persistent fevers Afebrile since 12/14. .Trach changed 12/15 to #7 cuffless due to dislodgement 12/16 CT A/P wo concern for infectious process. but concern for bladder lesion     Plan      Neuro stable  B/L DVT- Continue Eliquis   Fevers- Afebrile since 12/14  Trach care/ tube feeds Supportive care  Bladder lesion on CT A/P- Urology consult called .   Patient DNR  If remains afebrile discharge to  [facility]        60 yo male with PMH of HTN, CAD s/p stent on DAPT, T2DM who complained of headache at work at approximately ~8:30, starting feeling dizzy and vomiting, HTN/keke when EMS got to him. SBP 220s, HR 50s. On EMS arrival at 09:39AM 11/4/21, patient seen talking a little, garbling, moving all extremities, then quickly became unresponsive. No known blood thinners. CTH showed large posterior fossa bleed w/ IVH/SAH/hydro. Pre/intubation exam: no eyes open, pupils 2b/l, + corneals/cough/gag, not FC, LUE spont fingers moving, flaccid otherwise. 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 EVD clamped and removed. NSCU course complicated by respiratory failure and dysphagia now s/p trach and PEG on 11/19.   S/p VPS placement Certas @ 4.0  on 11/23. Completed course of cefepime for enterobacter and pseudomonas pneumonia on 11/21. Transferred out of NSCU on 11/25. Began to spike fevers overnight with watery output from rectal tube. Pan cx 11/26. Cdiff +. Completed vanco po CT head 11/26 stable vent/ stable edema & heme post fossa . Tx to  NSCU 12/7 CTH  showed moderate ventricular enlargement , unable to tap shunt  concern for VPS malfunction and pseudomeningocele tap, wo concern for ventriculitis CTH 12/9 stable, no OR plans, VPS@4 Persistent fevers Afebrile since 12/14. .Trach changed 12/15 to #7 cuffless due to dislodgement 12/16 CT A/P wo concern for infectious process. but concern for bladder lesion     Plan      Neuro stable  B/L DVT- Continue Eliquis   Hypernatremia- improving on Free water   Fevers- Afebrile since 12/14  Trach care/ tube feeds Supportive care  Bladder lesion on CT A/P- Urology consult called .   Patient DNR  If remains afebrile discharge to  [facility]

## 2021-12-17 NOTE — PROGRESS NOTE ADULT - PROBLEM SELECTOR PLAN 10
on eliquis  monitor BM- last BM 12/16    continue free water 300cc q4h   hypernatremi resolved  Monitor BMP    Eventual cystoscopy (perhaps as outpatient) for further evaluation of a 2.4 cm nodular soft tissue density in the bladder concerning for bladder lesion as seen on CT a/p.

## 2021-12-17 NOTE — PROGRESS NOTE ADULT - ASSESSMENT
60 yo male with PMH of HTN   Admitted on 11/4 with severe headache and found to have cerebellar bleed.  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  S/p trach and peg.   He has distal DVT's - on apixaban.  He received cefepime 11/4-11/21 for respiratory coverage.  Developed watery C diff diarrhea on 11/26 - started on oral vancomycin   Then restarted on cefepime on 11/28 for concerns of pneumonia since had low grade temps. Giorgio neb added later  But CXR's remained clear.  Ct of brain showed residual blood.  Pseudomonas in sputum found to be resistant to carbapenems & quinolones. Sputum isolated strep pneumoniae as well    11/28 urine and blood cultures are negative.  Cefepime completed on 12/06 & Giorgio nebs on 12/07/21  Failed TOV & teixeira was replaced for retention of 1000 ml of urine on 12/06/21 PM  Spiked a fever to 102F ? in response to retention  UA with 3 WBCs, no nit or LE, large blood   CXR with clear lungs  CTH revealed a Pseudomeningocele, s/p tap with gram stain without organism  CSF finalized as no growth  Blood & urine cx also finalized as no growth  Completed treatment for C diff w 2 wks of oral vanco on 12/10/21  Diarrhea resolved  Now with fever x 2-3 days, no clinical evidence of C Diff relapse, UA bland, and CXR is clear.  Repeat blood cultures and urine culture negative, sputum with MURF.  Source of fevers not clear   ? Small aspirations, ? occult hematoma  CT results reviewed no mention of an infectious process   blood cx no growth   urine cx no growth  respiratory cx reports normal bindu   remains afebrile   wbc stable    Suggest:  continue to observe off antibiotics he remain afebrile   continue supportive care as per primary team

## 2021-12-17 NOTE — PROGRESS NOTE ADULT - SUBJECTIVE AND OBJECTIVE BOX
[facility] Division of Hospital Medicine  Ros Mcgregor MD  spectra [id number]    Patient is a 61y old  Male who presents with a chief complaint of IVH/SAH (15 Dec 2021 14:13)      SUBJECTIVE / OVERNIGHT EVENTS: unable to obtain ROS from the patient. remains afebrile.  ADDITIONAL REVIEW OF SYSTEMS:    MEDICATIONS  (STANDING):  acetaminophen   IVPB .. 1000 milliGRAM(s) IV Intermittent once  acetylcysteine 20%  Inhalation 4 milliLiter(s) Inhalation every 6 hours  albuterol/ipratropium for Nebulization 3 milliLiter(s) Nebulizer every 6 hours  apixaban 5 milliGRAM(s) Oral two times a day  artificial  tears Solution 1 Drop(s) Both EYES every 6 hours  atorvastatin 40 milliGRAM(s) Oral at bedtime  chlorhexidine 0.12% Liquid 15 milliLiter(s) Oral Mucosa two times a day  dextrose 5% + sodium chloride 0.45%. 1000 milliLiter(s) (75 mL/Hr) IV Continuous <Continuous>  dextrose 50% Injectable 25 Gram(s) IV Push once  dextrose 50% Injectable 12.5 Gram(s) IV Push once  doxazosin 2 milliGRAM(s) Oral daily  insulin lispro (ADMELOG) corrective regimen sliding scale   SubCutaneous every 6 hours  insulin NPH human recombinant 12 Unit(s) SubCutaneous every 6 hours  metoprolol tartrate 12.5 milliGRAM(s) Oral two times a day  multivitamin 1 Tablet(s) Oral daily  nystatin Powder 1 Application(s) Topical two times a day  pantoprazole   Suspension 40 milliGRAM(s) Oral daily  sodium chloride 3%  Inhalation 3 milliLiter(s) Inhalation every 6 hours    MEDICATIONS  (PRN):  acetaminophen    Suspension .. 650 milliGRAM(s) Oral every 6 hours PRN Temp greater or equal to 38C (100.4F), Mild Pain (1 - 3)      CAPILLARY BLOOD GLUCOSE      POCT Blood Glucose.: 195 mg/dL (17 Dec 2021 11:13)  POCT Blood Glucose.: 199 mg/dL (17 Dec 2021 05:23)  POCT Blood Glucose.: 183 mg/dL (17 Dec 2021 01:10)  POCT Blood Glucose.: 192 mg/dL (17 Dec 2021 00:10)  POCT Blood Glucose.: 167 mg/dL (16 Dec 2021 17:15)    I&O's Summary    16 Dec 2021 07:01  -  17 Dec 2021 07:00  --------------------------------------------------------  IN: 900 mL / OUT: 1750 mL / NET: -850 mL        PHYSICAL EXAM:  Vital Signs Last 24 Hrs  T(C): 36.8 (17 Dec 2021 11:08), Max: 37.1 (17 Dec 2021 00:06)  T(F): 98.2 (17 Dec 2021 11:08), Max: 98.7 (17 Dec 2021 00:06)  HR: 98 (17 Dec 2021 11:08) (89 - 105)  BP: 106/70 (17 Dec 2021 11:08) (103/65 - 118/78)  BP(mean): --  RR: 18 (17 Dec 2021 11:08) (16 - 20)  SpO2: 99% (17 Dec 2021 11:08) (97% - 99%)    CONSTITUTIONAL: NAD, well-groomed  NECK: + trach collar  RESPIRATORY: Normal respiratory effort; scattered rhonchi  CARDIOVASCULAR: normal S1 and S2, no murmur/rub/gallop; No lower extremity edema  ABDOMEN: Nontender to palpation, normoactive bowel sounds, no rebound/guarding; + PEG  MUSCULOSKELETAL:  no clubbing or cyanosis of digits; no joint swelling or tenderness to palpation  PSYCH: sleeping; flat affect  NEURO: doesn't follow commands  SKIN: No rashes; no palpable lesions    LABS:                        8.2    11.74 )-----------( 280      ( 17 Dec 2021 07:32 )             27.1     12-17    142  |  105  |  58<H>  ----------------------------<  198<H>  5.4<H>   |  26  |  0.90    Ca    8.8      17 Dec 2021 05:51                  RADIOLOGY & ADDITIONAL TESTS:  Results Reviewed:   Imaging Personally Reviewed:  Electrocardiogram Personally Reviewed:    COORDINATION OF CARE:  Care Discussed with Consultants/Other Providers [Y/N]: neurosurgery Norberto)  Prior or Outpatient Records Reviewed [Y/N]:

## 2021-12-17 NOTE — CONSULT NOTE ADULT - ASSESSMENT
62 yo male found with likely bladder tumor     60 yo male found with likely bladder tumor; given patients young age will proceed with inpt workup    imaging has already been obtained   please obtain urine cytology  check psa   please comment whether pt may be able to be cleared for cysto transurethral resection of bladder tumor in the future  will follow

## 2021-12-17 NOTE — PROGRESS NOTE ADULT - SUBJECTIVE AND OBJECTIVE BOX
Chief Complaint:  Patient is a 61y old  Male who presents with a chief complaint of IVH/SAH (15 Dec 2021 14:13)      Date of service 12-17-21 @ 12:12      Interval Events:   Patient seen and examined. No acute overnight events. H/H stable.     Hospital Medications:  acetaminophen    Suspension .. 650 milliGRAM(s) Oral every 6 hours PRN  acetaminophen   IVPB .. 1000 milliGRAM(s) IV Intermittent once  acetylcysteine 20%  Inhalation 4 milliLiter(s) Inhalation every 6 hours  albuterol/ipratropium for Nebulization 3 milliLiter(s) Nebulizer every 6 hours  apixaban 5 milliGRAM(s) Oral two times a day  artificial  tears Solution 1 Drop(s) Both EYES every 6 hours  atorvastatin 40 milliGRAM(s) Oral at bedtime  chlorhexidine 0.12% Liquid 15 milliLiter(s) Oral Mucosa two times a day  dextrose 5% + sodium chloride 0.45%. 1000 milliLiter(s) IV Continuous <Continuous>  dextrose 50% Injectable 25 Gram(s) IV Push once  dextrose 50% Injectable 12.5 Gram(s) IV Push once  doxazosin 2 milliGRAM(s) Oral daily  insulin lispro (ADMELOG) corrective regimen sliding scale   SubCutaneous every 6 hours  insulin NPH human recombinant 12 Unit(s) SubCutaneous every 6 hours  metoprolol tartrate 12.5 milliGRAM(s) Oral two times a day  multivitamin 1 Tablet(s) Oral daily  nystatin Powder 1 Application(s) Topical two times a day  pantoprazole   Suspension 40 milliGRAM(s) Oral daily  sodium chloride 3%  Inhalation 3 milliLiter(s) Inhalation every 6 hours        Review of Systems:  unable to obtain    PHYSICAL EXAM:   Vital Signs:  Vital Signs Last 24 Hrs  T(C): 36.8 (17 Dec 2021 11:08), Max: 37.1 (17 Dec 2021 00:06)  T(F): 98.2 (17 Dec 2021 11:08), Max: 98.7 (17 Dec 2021 00:06)  HR: 98 (17 Dec 2021 11:08) (89 - 105)  BP: 106/70 (17 Dec 2021 11:08) (103/65 - 118/78)  BP(mean): --  RR: 18 (17 Dec 2021 11:08) (16 - 20)  SpO2: 99% (17 Dec 2021 11:08) (97% - 99%)  Daily     Daily       PHYSICAL EXAM:     GENERAL:  Appears stated age, well-groomed, well-nourished, no distress  HEENT:  NC/AT,  conjunctivae anicteric, clear and pink,   NECK: supple, trachea midline, +trach   CHEST:  Full & symmetric excursion, no increased effort, breath sounds clear  HEART:  Regular rhythm, no JVD  ABDOMEN:  Soft, non-tender, non-distended, normoactive bowel sounds,  no masses , no hepatosplenomegaly  EXTREMITIES:  no cyanosis,clubbing or edema  SKIN:  No rash, erythema, or, ecchymoses, no jaundice  NEURO:  non-focal, no asterixis  RECTAL: Deferred      LABS Personally reviewed by me:                        8.2    11.74 )-----------( 280      ( 17 Dec 2021 07:32 )             27.1     Mean Cell Volume: 97.5 fl (12-17-21 @ 07:32)    12-17    142  |  105  |  58<H>  ----------------------------<  198<H>  5.4<H>   |  26  |  0.90    Ca    8.8      17 Dec 2021 05:51                                    8.2    11.74 )-----------( 280      ( 17 Dec 2021 07:32 )             27.1                         8.9    12.53 )-----------( 404      ( 15 Dec 2021 05:24 )             29.3       Imaging personally reviewed by me:

## 2021-12-18 ENCOUNTER — RESULT REVIEW (OUTPATIENT)
Age: 61
End: 2021-12-18

## 2021-12-18 DIAGNOSIS — N32.9 BLADDER DISORDER, UNSPECIFIED: ICD-10-CM

## 2021-12-18 LAB — PSA FLD-MCNC: 2.24 NG/ML — SIGNIFICANT CHANGE UP (ref 0–4)

## 2021-12-18 PROCEDURE — 88112 CYTOPATH CELL ENHANCE TECH: CPT | Mod: 26

## 2021-12-18 PROCEDURE — 99233 SBSQ HOSP IP/OBS HIGH 50: CPT

## 2021-12-18 RX ADMIN — APIXABAN 5 MILLIGRAM(S): 2.5 TABLET, FILM COATED ORAL at 05:17

## 2021-12-18 RX ADMIN — SODIUM CHLORIDE 3 MILLILITER(S): 9 INJECTION INTRAMUSCULAR; INTRAVENOUS; SUBCUTANEOUS at 17:28

## 2021-12-18 RX ADMIN — Medication 12.5 MILLIGRAM(S): at 05:17

## 2021-12-18 RX ADMIN — HUMAN INSULIN 12 UNIT(S): 100 INJECTION, SUSPENSION SUBCUTANEOUS at 23:11

## 2021-12-18 RX ADMIN — Medication 4 MILLILITER(S): at 12:02

## 2021-12-18 RX ADMIN — Medication 3 MILLILITER(S): at 17:28

## 2021-12-18 RX ADMIN — Medication 2: at 18:06

## 2021-12-18 RX ADMIN — Medication 2: at 23:14

## 2021-12-18 RX ADMIN — SODIUM CHLORIDE 3 MILLILITER(S): 9 INJECTION INTRAMUSCULAR; INTRAVENOUS; SUBCUTANEOUS at 05:16

## 2021-12-18 RX ADMIN — SODIUM CHLORIDE 3 MILLILITER(S): 9 INJECTION INTRAMUSCULAR; INTRAVENOUS; SUBCUTANEOUS at 23:12

## 2021-12-18 RX ADMIN — SODIUM CHLORIDE 3 MILLILITER(S): 9 INJECTION INTRAMUSCULAR; INTRAVENOUS; SUBCUTANEOUS at 12:02

## 2021-12-18 RX ADMIN — CHLORHEXIDINE GLUCONATE 15 MILLILITER(S): 213 SOLUTION TOPICAL at 17:29

## 2021-12-18 RX ADMIN — Medication 1 DROP(S): at 17:28

## 2021-12-18 RX ADMIN — Medication 2: at 05:27

## 2021-12-18 RX ADMIN — Medication 3 MILLILITER(S): at 05:17

## 2021-12-18 RX ADMIN — Medication 1 TABLET(S): at 12:03

## 2021-12-18 RX ADMIN — Medication 4 MILLILITER(S): at 23:13

## 2021-12-18 RX ADMIN — Medication 12.5 MILLIGRAM(S): at 17:30

## 2021-12-18 RX ADMIN — PANTOPRAZOLE SODIUM 40 MILLIGRAM(S): 20 TABLET, DELAYED RELEASE ORAL at 12:01

## 2021-12-18 RX ADMIN — HUMAN INSULIN 12 UNIT(S): 100 INJECTION, SUSPENSION SUBCUTANEOUS at 18:06

## 2021-12-18 RX ADMIN — HUMAN INSULIN 12 UNIT(S): 100 INJECTION, SUSPENSION SUBCUTANEOUS at 12:01

## 2021-12-18 RX ADMIN — APIXABAN 5 MILLIGRAM(S): 2.5 TABLET, FILM COATED ORAL at 17:30

## 2021-12-18 RX ADMIN — Medication 3 MILLILITER(S): at 23:12

## 2021-12-18 RX ADMIN — CHLORHEXIDINE GLUCONATE 15 MILLILITER(S): 213 SOLUTION TOPICAL at 05:16

## 2021-12-18 RX ADMIN — Medication 1 DROP(S): at 23:12

## 2021-12-18 RX ADMIN — Medication 1 DROP(S): at 05:15

## 2021-12-18 RX ADMIN — Medication 3 MILLILITER(S): at 12:02

## 2021-12-18 RX ADMIN — Medication 2 MILLIGRAM(S): at 05:17

## 2021-12-18 RX ADMIN — HUMAN INSULIN 12 UNIT(S): 100 INJECTION, SUSPENSION SUBCUTANEOUS at 05:26

## 2021-12-18 RX ADMIN — NYSTATIN CREAM 1 APPLICATION(S): 100000 CREAM TOPICAL at 17:29

## 2021-12-18 RX ADMIN — Medication 4 MILLILITER(S): at 05:17

## 2021-12-18 RX ADMIN — Medication 4: at 12:01

## 2021-12-18 RX ADMIN — Medication 1 DROP(S): at 12:01

## 2021-12-18 RX ADMIN — ATORVASTATIN CALCIUM 40 MILLIGRAM(S): 80 TABLET, FILM COATED ORAL at 22:55

## 2021-12-18 RX ADMIN — Medication 4 MILLILITER(S): at 17:29

## 2021-12-18 RX ADMIN — NYSTATIN CREAM 1 APPLICATION(S): 100000 CREAM TOPICAL at 05:22

## 2021-12-18 NOTE — PROGRESS NOTE ADULT - PROBLEM SELECTOR PLAN 11
on eliquis  monitor BM- last BM 12/16    continue free water 300cc q4h   hypernatremia resolved  Monitor BMP    d/c planning to rehab in progress. please ensure that the patient's wife has appropriate follow up information and instructions with .    Will follow periodically.

## 2021-12-18 NOTE — PROGRESS NOTE ADULT - SUBJECTIVE AND OBJECTIVE BOX
Urology consult for suspected bladder mass.     OBJECTIVE:  Vital Signs Last 24 Hrs  T(C): 37.1 (18 Dec 2021 07:28), Max: 37.4 (17 Dec 2021 23:45)  T(F): 98.7 (18 Dec 2021 07:28), Max: 99.3 (17 Dec 2021 23:45)  HR: 90 (18 Dec 2021 07:28) (90 - 97)  BP: 124/72 (18 Dec 2021 07:28) (110/70 - 124/72)  BP(mean): --  RR: 18 (18 Dec 2021 07:28) (18 - 18)  SpO2: 99% (18 Dec 2021 07:28) (97% - 100%)    Physical Examination:  GEN: NAD, resting quietly      LABS:                        8.2    11.74 )-----------( 280      ( 17 Dec 2021 07:32 )             27.1       12-17    142  |  105  |  58<H>  ----------------------------<  198<H>  5.4<H>   |  26  |  0.90    Ca    8.8      17 Dec 2021 05:51

## 2021-12-18 NOTE — PROGRESS NOTE ADULT - ASSESSMENT
62 yo male found with likely bladder tumor; given patients young age will proceed with inpt workup    imaging has already been obtained   please obtain urine cytology  check psa   please comment whether pt may be able to be cleared for cysto transurethral resection of bladder tumor in the future      Plan discussed with Dr. rOtiz

## 2021-12-18 NOTE — PROGRESS NOTE ADULT - PROBLEM SELECTOR PLAN 10
CT a/p with a 2.4 cm nodular soft tissue density in the bladder appears separate from the prostate gland, concerning for bladder lesion  d/w urology- findings concerning for bladder tumor vs prostate gland  PSA normal, check urine cytology  Spoke to urology team regarding cysto transurethral resection which requires general anesthesia. Per , recommend to f/u urine cytology result to determine next steps but it doesn't have to hold up the discharge.     I spoke to the patient's wife, Sandrita Bauer [phone]. Given the patient's underlying comorbid conditions coupled with his recent devastating neurologic events, other medical complications that ensued, and his poor neurologic and functional status at this time, it may be prudent to monitor his progress and see if he makes any meaningful recovery over the coming weeks to months to decide whether the benefit of pursuing any invasive work up for the bladder lesion outweighs the risk. Spouse seems to be in agreement at the moment.

## 2021-12-18 NOTE — PROGRESS NOTE ADULT - ASSESSMENT
60 yo male with PMH of HTN, CAD s/p stent on DAPT, T2DM who complained of headache at work at approximately ~8:30, starting feeling dizzy and vomiting, HTN/keke when EMS got to him. SBP 220s, HR 50s. On EMS arrival at 09:39AM 11/4/21, patient seen talking a little, garbling, moving all extremities, then quickly became unresponsive. No known blood thinners. CTH showed large posterior fossa bleed w/ IVH/SAH/hydro. Pre/intubation exam: no eyes open, pupils 2b/l, + corneals/cough/gag, not FC, LUE spont fingers moving, flaccid otherwise. 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 EVD clamped and removed. NSCU course complicated by respiratory failure and dysphagia now s/p trach and PEG on 11/19.   S/p VPS placement Certas @ 4.0  on 11/23. Completed course of cefepime for enterobacter and pseudomonas pneumonia on 11/21. Transferred out of NSCU on 11/25. Began to spike fevers overnight with watery output from rectal tube. Pan cx 11/26. Cdiff +. Completed vanco po CT head 11/26 stable vent/ stable edema & heme post fossa . Tx to  NSCU 12/7 CTH  showed moderate ventricular enlargement , unable to tap shunt  concern for VPS malfunction and pseudomeningocele tap, wo concern for ventriculitis CTH 12/9 stable, no OR plans, VPS@4 Persistent fevers Afebrile since 12/14. .Trach changed 12/15 to #7 cuffless due to dislodgement 12/16 CT A/P wo concern for infectious process. but concern for bladder lesion     Plan      Neuro stable  B/L DVT- Continue Eliquis   Hypernatremia- improving on Free water   Fevers- Afebrile since 12/14  Urology following for renal lesion, PSA/Cytology pending, GOC necessary for any urologic intervention  Trach care/ tube feeds Supportive care  Patient DNR  If remains afebrile discharge to [facility]

## 2021-12-18 NOTE — PROGRESS NOTE ADULT - ASSESSMENT
61 year old man with respiratory failure d/t ICH    1. ICH  -per neurosurgery,  -s/p  shunt    2. Respiratory failure  -for tracheostomy, will require long term enteral nutrition  -s/p PEG, tolerating feeds, continue use of abdominal binder  - aspiration precautions     3. Enterobacter PNA  -s/p course of antibiotics     4. afib with RVR    5. Anemia, no overt GI bleed. EGD unremarkable.   -12/14: hgb dropped to 6.9 last night; received 1 unit of PRBCs- hgb this morning increased to 8.2  -12/15: Hgb 8.9 today  -trend CBC  -continue PPI  -monitor for GI bleeding as apixaban has been resumed     6. Cdiff infection   -s/p abx course  -PO vanco discontinued   -no additional episodes of diarrhea  -consider adding banatrol to feeds if diarrhea resumes     7. DVT on a/c  -apixaban 5mg resumed  -continue PPI     8. fever of 101.3F on 12/13  -BC and RVP negative/no growth  -ID following   -afebrile since 12/14          [facility]  Gastroenterology and Hepatology  [address] [place], [place]  Office: [phone]  Cell: [phone]

## 2021-12-18 NOTE — PROGRESS NOTE ADULT - SUBJECTIVE AND OBJECTIVE BOX
HPI:  61M hx HTN, DM, cardiac stent on ASA. At work complaining of HA ~8:30, starting feeling dizzy and vomiting, HTN/keke when EMS got to him. SBP 220s, HR 50s.     On EMS arrival at 09:39AM 11/4/21, patient seen talking a little, garbling, moving all extremities, then quickly became unresponsive. No known blood thinners.    CTH shows large posterior fossa bleed w/ IVH/SAH/hydro.    Exam:  Pre/intubation exam: no eyes open, pupils 2b/l, + corneals/cough/gag, not FC, LUE spont fingers moving, flacid otherwise    ICU Vital Signs Last 24 Hrs  T(C): 34.8 (04 Nov 2021 12:00), Max: 34.8 (04 Nov 2021 12:00)  T(F): 94.6 (04 Nov 2021 12:00), Max: 94.6 (04 Nov 2021 12:00)  HR: 127 (04 Nov 2021 11:30) (109 - 146)  BP: 123/78 (04 Nov 2021 11:30) (123/78 - 245/142)  BP(mean): --  ABP: --  ABP(mean): --  RR: 19 (04 Nov 2021 11:30) (19 - 30)  SpO2: 100% (04 Nov 2021 11:30) (100% - 100%)  11-04    140  |  103  |  18  ----------------------------<  226<H>  3.3<L>   |  19<L>  |  1.24    Ca    9.5      04 Nov 2021 10:16    TPro  7.8  /  Alb  4.8  /  TBili  0.4  /  DBili  x   /  AST  18  /  ALT  14  /  AlkPhos  94  11-04                        12.9   15.29 )-----------( 322      ( 04 Nov 2021 10:16 )             40.5    (04 Nov 2021 12:28)    OVERNIGHT EVENTS:  Vital Signs Last 24 Hrs  T(C): 36.8 (18 Dec 2021 04:59), Max: 37.4 (17 Dec 2021 23:45)  T(F): 98.2 (18 Dec 2021 04:59), Max: 99.3 (17 Dec 2021 23:45)  HR: 97 (18 Dec 2021 04:59) (94 - 98)  BP: 110/70 (18 Dec 2021 04:59) (106/70 - 120/70)  BP(mean): --  RR: 18 (18 Dec 2021 04:59) (18 - 18)  SpO2: 99% (18 Dec 2021 04:59) (97% - 100%)    I&O's Detail    17 Dec 2021 07:01  -  18 Dec 2021 07:00  --------------------------------------------------------  IN:    Glucerna: 900 mL  Total IN: 900 mL    OUT:    Intermittent Catheterization - Urethral (mL): 900 mL    Intermittent Catheterization - Urethral (mL): 650 mL  Total OUT: 1550 mL    Total NET: -650 mL        I&O's Summary    17 Dec 2021 07:01  -  18 Dec 2021 07:00  --------------------------------------------------------  IN: 900 mL / OUT: 1550 mL / NET: -650 mL        PHYSICAL EXAM:  Neurological:  Trach collar, EOS, downward gaze pupil 3mm NR b/l, interm FC (squeezes hand), otherwise uppers nothing, b/l LEs wd    DIET:  [] NPO  [] Mechanical  [] Tube feeds    LABS:                        8.2    11.74 )-----------( 280      ( 17 Dec 2021 07:32 )             27.1     12-17    142  |  105  |  58<H>  ----------------------------<  198<H>  5.4<H>   |  26  |  0.90    Ca    8.8      17 Dec 2021 05:51              CAPILLARY BLOOD GLUCOSE      POCT Blood Glucose.: 157 mg/dL (18 Dec 2021 05:24)  POCT Blood Glucose.: 168 mg/dL (17 Dec 2021 22:58)  POCT Blood Glucose.: 185 mg/dL (17 Dec 2021 21:34)  POCT Blood Glucose.: 146 mg/dL (17 Dec 2021 18:48)  POCT Blood Glucose.: 195 mg/dL (17 Dec 2021 11:13)      Drug Levels: [] N/A    CSF Analysis: [] N/A      Allergies    penicillin (Other)    Intolerances      MEDICATIONS:  Antibiotics:    Neuro:  acetaminophen    Suspension .. 650 milliGRAM(s) Oral every 6 hours PRN  acetaminophen   IVPB .. 1000 milliGRAM(s) IV Intermittent once    Anticoagulation:  apixaban 5 milliGRAM(s) Oral two times a day    OTHER:  acetylcysteine 20%  Inhalation 4 milliLiter(s) Inhalation every 6 hours  albuterol/ipratropium for Nebulization 3 milliLiter(s) Nebulizer every 6 hours  artificial  tears Solution 1 Drop(s) Both EYES every 6 hours  atorvastatin 40 milliGRAM(s) Oral at bedtime  chlorhexidine 0.12% Liquid 15 milliLiter(s) Oral Mucosa two times a day  dextrose 50% Injectable 25 Gram(s) IV Push once  dextrose 50% Injectable 12.5 Gram(s) IV Push once  doxazosin 2 milliGRAM(s) Oral daily  insulin lispro (ADMELOG) corrective regimen sliding scale   SubCutaneous every 6 hours  insulin NPH human recombinant 12 Unit(s) SubCutaneous every 6 hours  metoprolol tartrate 12.5 milliGRAM(s) Oral two times a day  nystatin Powder 1 Application(s) Topical two times a day  pantoprazole   Suspension 40 milliGRAM(s) Oral daily  sodium chloride 3%  Inhalation 3 milliLiter(s) Inhalation every 6 hours    IVF:  dextrose 5% + sodium chloride 0.45%. 1000 milliLiter(s) IV Continuous <Continuous>  multivitamin 1 Tablet(s) Oral daily    CULTURES:  Culture Results:   Normal Respiratory Susan present (12-14 @ 08:59)  Culture Results:   No growth (12-14 @ 06:49)    RADIOLOGY & ADDITIONAL TESTS:

## 2021-12-18 NOTE — PROGRESS NOTE ADULT - PROBLEM SELECTOR PLAN 5
s/p trach on trach collar  trach secured per ENT on 12/15  - continue trach care and suctioning  - monitor O2 sats  - continue nebs. change to xopenex if tachycardia persists but overall improved

## 2021-12-18 NOTE — PROGRESS NOTE ADULT - SUBJECTIVE AND OBJECTIVE BOX
[facility] Division of Hospital Medicine  Ros Mcgregor MD  spectra [phone]    Patient is a 61y old  Male who presents with a chief complaint of IVH/SAH (15 Dec 2021 14:13)      SUBJECTIVE / OVERNIGHT EVENTS: unable to obtain ROS from the patient but seems more alert and awake today.   ADDITIONAL REVIEW OF SYSTEMS:    MEDICATIONS  (STANDING):  acetaminophen   IVPB .. 1000 milliGRAM(s) IV Intermittent once  acetylcysteine 20%  Inhalation 4 milliLiter(s) Inhalation every 6 hours  albuterol/ipratropium for Nebulization 3 milliLiter(s) Nebulizer every 6 hours  apixaban 5 milliGRAM(s) Oral two times a day  artificial  tears Solution 1 Drop(s) Both EYES every 6 hours  atorvastatin 40 milliGRAM(s) Oral at bedtime  chlorhexidine 0.12% Liquid 15 milliLiter(s) Oral Mucosa two times a day  dextrose 5% + sodium chloride 0.45%. 1000 milliLiter(s) (75 mL/Hr) IV Continuous <Continuous>  dextrose 50% Injectable 25 Gram(s) IV Push once  dextrose 50% Injectable 12.5 Gram(s) IV Push once  doxazosin 2 milliGRAM(s) Oral daily  insulin lispro (ADMELOG) corrective regimen sliding scale   SubCutaneous every 6 hours  insulin NPH human recombinant 12 Unit(s) SubCutaneous every 6 hours  metoprolol tartrate 12.5 milliGRAM(s) Oral two times a day  multivitamin 1 Tablet(s) Oral daily  nystatin Powder 1 Application(s) Topical two times a day  pantoprazole   Suspension 40 milliGRAM(s) Oral daily  sodium chloride 3%  Inhalation 3 milliLiter(s) Inhalation every 6 hours    MEDICATIONS  (PRN):  acetaminophen    Suspension .. 650 milliGRAM(s) Oral every 6 hours PRN Temp greater or equal to 38C (100.4F), Mild Pain (1 - 3)      CAPILLARY BLOOD GLUCOSE      POCT Blood Glucose.: 215 mg/dL (18 Dec 2021 11:37)  POCT Blood Glucose.: 157 mg/dL (18 Dec 2021 05:24)  POCT Blood Glucose.: 168 mg/dL (17 Dec 2021 22:58)  POCT Blood Glucose.: 185 mg/dL (17 Dec 2021 21:34)  POCT Blood Glucose.: 146 mg/dL (17 Dec 2021 18:48)    I&O's Summary    17 Dec 2021 07:01  -  18 Dec 2021 07:00  --------------------------------------------------------  IN: 900 mL / OUT: 1550 mL / NET: -650 mL    18 Dec 2021 07:01  -  18 Dec 2021 13:26  --------------------------------------------------------  IN: 0 mL / OUT: 750 mL / NET: -750 mL        PHYSICAL EXAM:  Vital Signs Last 24 Hrs  T(C): 37.1 (18 Dec 2021 07:28), Max: 37.4 (17 Dec 2021 23:45)  T(F): 98.7 (18 Dec 2021 07:28), Max: 99.3 (17 Dec 2021 23:45)  HR: 90 (18 Dec 2021 07:28) (90 - 97)  BP: 124/72 (18 Dec 2021 07:28) (110/70 - 124/72)  BP(mean): --  RR: 18 (18 Dec 2021 07:28) (18 - 18)  SpO2: 99% (18 Dec 2021 07:28) (97% - 100%)    CONSTITUTIONAL: NAD, well-groomed  NECK: + trach collar  RESPIRATORY: Normal respiratory effort; clear to auscultation  CARDIOVASCULAR: normal S1 and S2, no murmur/rub/gallop; No lower extremity edema  ABDOMEN: Nontender to palpation, normoactive bowel sounds, no rebound/guarding; + PEG  MUSCULOSKELETAL:  no clubbing or cyanosis of digits; no joint swelling or tenderness to palpation  PSYCH: alert and awake; flat affect  NEURO: more awake today, able to squeeze my hands on command today  SKIN: No rashes; no palpable lesions    LABS:                        8.2    11.74 )-----------( 280      ( 17 Dec 2021 07:32 )             27.1     12-17    142  |  105  |  58<H>  ----------------------------<  198<H>  5.4<H>   |  26  |  0.90    Ca    8.8      17 Dec 2021 05:51                  RADIOLOGY & ADDITIONAL TESTS:  Results Reviewed:   Imaging Personally Reviewed:  Electrocardiogram Personally Reviewed:    COORDINATION OF CARE:  Care Discussed with Consultants/Other Providers [Y/N]: neurosurgery resident(Max), urology  Prior or Outpatient Records Reviewed [Y/N]:

## 2021-12-18 NOTE — PROGRESS NOTE ADULT - PROBLEM SELECTOR PLAN 1
afebrile since 12/6 but recurrent fevers starting 12/13, remains afebrile now  ? related to episodes of microaspiration  Blood/urine/CSF cultures from 12/6-7 negative to date  repeat UA/Ucx negative  CXR clear  COVID PCR and RVP neg  sputum cx with normal resp bindu  repeat Bcx neg  No evidence of PNA on CT   leukocytosis downtrending on its own  CT c/a/p noted: A 2.4 cm nodular soft tissue density in the bladder appears separate from the prostate gland, concerning for bladder lesion. A 2.7 cm fluid collection adjacent to the right ischial tuberosity, may be related to ischiogluteal bursitis. Low clinical suspicion that these are contributing to his fevers.  ID follow up appreciated  monitor off abx for now but if recurrent fever, consider empiric treatment with IV cefepime 2g q12h

## 2021-12-18 NOTE — PROGRESS NOTE ADULT - SUBJECTIVE AND OBJECTIVE BOX
Chief Complaint:  Patient is a 61y old  Male who presents with a chief complaint of IVH/SAH (15 Dec 2021 14:13)            Interval Events:   Patient seen and examined. No acute overnight events. H/H stable.     Hospital Medications:  acetaminophen    Suspension .. 650 milliGRAM(s) Oral every 6 hours PRN  acetaminophen   IVPB .. 1000 milliGRAM(s) IV Intermittent once  acetylcysteine 20%  Inhalation 4 milliLiter(s) Inhalation every 6 hours  albuterol/ipratropium for Nebulization 3 milliLiter(s) Nebulizer every 6 hours  apixaban 5 milliGRAM(s) Oral two times a day  artificial  tears Solution 1 Drop(s) Both EYES every 6 hours  atorvastatin 40 milliGRAM(s) Oral at bedtime  chlorhexidine 0.12% Liquid 15 milliLiter(s) Oral Mucosa two times a day  dextrose 5% + sodium chloride 0.45%. 1000 milliLiter(s) IV Continuous <Continuous>  dextrose 50% Injectable 25 Gram(s) IV Push once  dextrose 50% Injectable 12.5 Gram(s) IV Push once  doxazosin 2 milliGRAM(s) Oral daily  insulin lispro (ADMELOG) corrective regimen sliding scale   SubCutaneous every 6 hours  insulin NPH human recombinant 12 Unit(s) SubCutaneous every 6 hours  metoprolol tartrate 12.5 milliGRAM(s) Oral two times a day  multivitamin 1 Tablet(s) Oral daily  nystatin Powder 1 Application(s) Topical two times a day  pantoprazole   Suspension 40 milliGRAM(s) Oral daily  sodium chloride 3%  Inhalation 3 milliLiter(s) Inhalation every 6 hours        Review of Systems:  unable to obtain    PHYSICAL EXAM:   Vital Signs Last 24 Hrs  T(C): 36.9 (18 Dec 2021 15:41), Max: 37.4 (17 Dec 2021 23:45)  T(F): 98.5 (18 Dec 2021 15:41), Max: 99.3 (17 Dec 2021 23:45)  HR: 94 (18 Dec 2021 17:26) (90 - 99)  BP: 118/78 (18 Dec 2021 17:26) (101/70 - 124/72)  BP(mean): --  RR: 18 (18 Dec 2021 15:41) (18 - 18)  SpO2: 100% (18 Dec 2021 15:41) (97% - 100%)      PHYSICAL EXAM:     GENERAL:  Appears stated age, well-groomed, well-nourished, no distress  HEENT:  NC/AT,  conjunctivae anicteric, clear and pink,   NECK: supple, trachea midline, +trach   CHEST:  Full & symmetric excursion, no increased effort, breath sounds clear  HEART:  Regular rhythm, no JVD  ABDOMEN:  Soft, non-tender, non-distended, normoactive bowel sounds,  no masses , no hepatosplenomegaly  EXTREMITIES:  no cyanosis,clubbing or edema  SKIN:  No rash, erythema, or, ecchymoses, no jaundice  NEURO:  non-focal, no asterixis  RECTAL: Deferred      LABS Personally reviewed by me:                      CBC Full  -  ( 17 Dec 2021 07:32 )  WBC Count : 11.74 K/uL  RBC Count : 2.78 M/uL  Hemoglobin : 8.2 g/dL  Hematocrit : 27.1 %  Platelet Count - Automated : 280 K/uL  Mean Cell Volume : 97.5 fl  Mean Cell Hemoglobin : 29.5 pg  Mean Cell Hemoglobin Concentration : 30.3 gm/dL  Auto Neutrophil # : x  Auto Lymphocyte # : x  Auto Monocyte # : x  Auto Eosinophil # : x  Auto Basophil # : x  Auto Neutrophil % : x  Auto Lymphocyte % : x  Auto Monocyte % : x  Auto Eosinophil % : x  Auto Basophil % : x

## 2021-12-19 ENCOUNTER — RESULT REVIEW (OUTPATIENT)
Age: 61
End: 2021-12-19

## 2021-12-19 LAB
ANION GAP SERPL CALC-SCNC: 12 MMOL/L — SIGNIFICANT CHANGE UP (ref 5–17)
BUN SERPL-MCNC: 47 MG/DL — HIGH (ref 7–23)
CALCIUM SERPL-MCNC: 8.7 MG/DL — SIGNIFICANT CHANGE UP (ref 8.4–10.5)
CHLORIDE SERPL-SCNC: 104 MMOL/L — SIGNIFICANT CHANGE UP (ref 96–108)
CO2 SERPL-SCNC: 26 MMOL/L — SIGNIFICANT CHANGE UP (ref 22–31)
CREAT SERPL-MCNC: 0.85 MG/DL — SIGNIFICANT CHANGE UP (ref 0.5–1.3)
CULTURE RESULTS: SIGNIFICANT CHANGE UP
CULTURE RESULTS: SIGNIFICANT CHANGE UP
GLUCOSE SERPL-MCNC: 160 MG/DL — HIGH (ref 70–99)
POTASSIUM SERPL-MCNC: 4.3 MMOL/L — SIGNIFICANT CHANGE UP (ref 3.5–5.3)
POTASSIUM SERPL-SCNC: 4.3 MMOL/L — SIGNIFICANT CHANGE UP (ref 3.5–5.3)
SARS-COV-2 RNA SPEC QL NAA+PROBE: SIGNIFICANT CHANGE UP
SODIUM SERPL-SCNC: 142 MMOL/L — SIGNIFICANT CHANGE UP (ref 135–145)
SPECIMEN SOURCE: SIGNIFICANT CHANGE UP
SPECIMEN SOURCE: SIGNIFICANT CHANGE UP

## 2021-12-19 PROCEDURE — 88112 CYTOPATH CELL ENHANCE TECH: CPT | Mod: 26

## 2021-12-19 RX ADMIN — Medication 12.5 MILLIGRAM(S): at 06:25

## 2021-12-19 RX ADMIN — Medication 2: at 06:22

## 2021-12-19 RX ADMIN — Medication 3 MILLILITER(S): at 23:26

## 2021-12-19 RX ADMIN — Medication 4: at 23:20

## 2021-12-19 RX ADMIN — Medication 4 MILLILITER(S): at 06:22

## 2021-12-19 RX ADMIN — Medication 1 TABLET(S): at 12:27

## 2021-12-19 RX ADMIN — SODIUM CHLORIDE 3 MILLILITER(S): 9 INJECTION INTRAMUSCULAR; INTRAVENOUS; SUBCUTANEOUS at 22:00

## 2021-12-19 RX ADMIN — CHLORHEXIDINE GLUCONATE 15 MILLILITER(S): 213 SOLUTION TOPICAL at 06:23

## 2021-12-19 RX ADMIN — HUMAN INSULIN 12 UNIT(S): 100 INJECTION, SUSPENSION SUBCUTANEOUS at 23:11

## 2021-12-19 RX ADMIN — HUMAN INSULIN 12 UNIT(S): 100 INJECTION, SUSPENSION SUBCUTANEOUS at 12:26

## 2021-12-19 RX ADMIN — SODIUM CHLORIDE 3 MILLILITER(S): 9 INJECTION INTRAMUSCULAR; INTRAVENOUS; SUBCUTANEOUS at 06:24

## 2021-12-19 RX ADMIN — Medication 1 DROP(S): at 18:36

## 2021-12-19 RX ADMIN — Medication 3 MILLILITER(S): at 06:25

## 2021-12-19 RX ADMIN — ATORVASTATIN CALCIUM 40 MILLIGRAM(S): 80 TABLET, FILM COATED ORAL at 23:13

## 2021-12-19 RX ADMIN — SODIUM CHLORIDE 3 MILLILITER(S): 9 INJECTION INTRAMUSCULAR; INTRAVENOUS; SUBCUTANEOUS at 12:27

## 2021-12-19 RX ADMIN — Medication 4 MILLILITER(S): at 18:45

## 2021-12-19 RX ADMIN — Medication 1 DROP(S): at 06:23

## 2021-12-19 RX ADMIN — APIXABAN 5 MILLIGRAM(S): 2.5 TABLET, FILM COATED ORAL at 18:35

## 2021-12-19 RX ADMIN — Medication 3 MILLILITER(S): at 18:37

## 2021-12-19 RX ADMIN — Medication 2 MILLIGRAM(S): at 06:24

## 2021-12-19 RX ADMIN — APIXABAN 5 MILLIGRAM(S): 2.5 TABLET, FILM COATED ORAL at 06:23

## 2021-12-19 RX ADMIN — SODIUM CHLORIDE 3 MILLILITER(S): 9 INJECTION INTRAMUSCULAR; INTRAVENOUS; SUBCUTANEOUS at 18:37

## 2021-12-19 RX ADMIN — Medication 2: at 12:26

## 2021-12-19 RX ADMIN — Medication 12.5 MILLIGRAM(S): at 18:35

## 2021-12-19 RX ADMIN — HUMAN INSULIN 12 UNIT(S): 100 INJECTION, SUSPENSION SUBCUTANEOUS at 18:36

## 2021-12-19 RX ADMIN — Medication 2: at 18:36

## 2021-12-19 RX ADMIN — NYSTATIN CREAM 1 APPLICATION(S): 100000 CREAM TOPICAL at 06:23

## 2021-12-19 RX ADMIN — Medication 4 MILLILITER(S): at 12:26

## 2021-12-19 RX ADMIN — Medication 1 DROP(S): at 12:27

## 2021-12-19 RX ADMIN — Medication 1 DROP(S): at 22:00

## 2021-12-19 RX ADMIN — Medication 1 DROP(S): at 23:13

## 2021-12-19 RX ADMIN — PANTOPRAZOLE SODIUM 40 MILLIGRAM(S): 20 TABLET, DELAYED RELEASE ORAL at 12:28

## 2021-12-19 RX ADMIN — CHLORHEXIDINE GLUCONATE 15 MILLILITER(S): 213 SOLUTION TOPICAL at 18:38

## 2021-12-19 RX ADMIN — NYSTATIN CREAM 1 APPLICATION(S): 100000 CREAM TOPICAL at 18:38

## 2021-12-19 RX ADMIN — HUMAN INSULIN 12 UNIT(S): 100 INJECTION, SUSPENSION SUBCUTANEOUS at 06:21

## 2021-12-19 RX ADMIN — Medication 4 MILLILITER(S): at 23:11

## 2021-12-19 RX ADMIN — Medication 3 MILLILITER(S): at 12:27

## 2021-12-19 NOTE — PROGRESS NOTE ADULT - SUBJECTIVE AND OBJECTIVE BOX
Chief Complaint:  Patient is a 61y old  Male who presents with a chief complaint of IVH/SAH (15 Dec 2021 14:13)            Interval Events:   Patient seen and examined. No acute overnight events. H/H stable.     Hospital Medications:  acetaminophen    Suspension .. 650 milliGRAM(s) Oral every 6 hours PRN  acetaminophen   IVPB .. 1000 milliGRAM(s) IV Intermittent once  acetylcysteine 20%  Inhalation 4 milliLiter(s) Inhalation every 6 hours  albuterol/ipratropium for Nebulization 3 milliLiter(s) Nebulizer every 6 hours  apixaban 5 milliGRAM(s) Oral two times a day  artificial  tears Solution 1 Drop(s) Both EYES every 6 hours  atorvastatin 40 milliGRAM(s) Oral at bedtime  chlorhexidine 0.12% Liquid 15 milliLiter(s) Oral Mucosa two times a day  dextrose 5% + sodium chloride 0.45%. 1000 milliLiter(s) IV Continuous <Continuous>  dextrose 50% Injectable 25 Gram(s) IV Push once  dextrose 50% Injectable 12.5 Gram(s) IV Push once  doxazosin 2 milliGRAM(s) Oral daily  insulin lispro (ADMELOG) corrective regimen sliding scale   SubCutaneous every 6 hours  insulin NPH human recombinant 12 Unit(s) SubCutaneous every 6 hours  metoprolol tartrate 12.5 milliGRAM(s) Oral two times a day  multivitamin 1 Tablet(s) Oral daily  nystatin Powder 1 Application(s) Topical two times a day  pantoprazole   Suspension 40 milliGRAM(s) Oral daily  sodium chloride 3%  Inhalation 3 milliLiter(s) Inhalation every 6 hours        Review of Systems:  unable to obtain    PHYSICAL EXAM:   Vital Signs Last 24 Hrs  T(C): 37.3 (19 Dec 2021 04:55), Max: 37.3 (19 Dec 2021 04:55)  T(F): 99.2 (19 Dec 2021 04:55), Max: 99.2 (19 Dec 2021 04:55)  HR: 89 (19 Dec 2021 04:55) (80 - 99)  BP: 107/70 (19 Dec 2021 04:55) (101/70 - 119/72)  BP(mean): --  RR: 18 (19 Dec 2021 04:55) (18 - 18)  SpO2: 100% (19 Dec 2021 04:55) (97% - 100%)      PHYSICAL EXAM:     GENERAL:  Appears stated age, well-groomed, well-nourished, no distress  HEENT:  NC/AT,  conjunctivae anicteric, clear and pink,   NECK: supple, trachea midline, +trach   CHEST:  Full & symmetric excursion, no increased effort, breath sounds clear  HEART:  Regular rhythm, no JVD  ABDOMEN:  Soft, non-tender, non-distended, normoactive bowel sounds,  no masses , no hepatosplenomegaly, gastrostomy intact  EXTREMITIES:  no cyanosis,clubbing or edema  SKIN:  No rash, erythema, or, ecchymoses, no jaundice  NEURO:  non-focal, no asterixis  RECTAL: Deferred      LABS Personally reviewed by me:                      CBC Full  -  ( 17 Dec 2021 07:32 )  WBC Count : 11.74 K/uL  RBC Count : 2.78 M/uL  Hemoglobin : 8.2 g/dL  Hematocrit : 27.1 %  Platelet Count - Automated : 280 K/uL  Mean Cell Volume : 97.5 fl  Mean Cell Hemoglobin : 29.5 pg  Mean Cell Hemoglobin Concentration : 30.3 gm/dL  Auto Neutrophil # : x  Auto Lymphocyte # : x  Auto Monocyte # : x  Auto Eosinophil # : x  Auto Basophil # : x  Auto Neutrophil % : x  Auto Lymphocyte % : x  Auto Monocyte % : x  Auto Eosinophil % : x  Auto Basophil % : x

## 2021-12-19 NOTE — PROGRESS NOTE ADULT - ASSESSMENT
60 yo male with PMH of HTN   Admitted on 11/4 with severe headache and found to have cerebellar bleed.  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  S/p trach and peg.   He has distal DVT's - on apixaban.  He received cefepime 11/4-11/21 for respiratory coverage.  Developed watery C diff diarrhea on 11/26 - started on oral vancomycin   Then restarted on cefepime on 11/28 for concerns of pneumonia since had low grade temps. Giorgio neb added later  But CXR's remained clear.  Ct of brain showed residual blood.  Pseudomonas in sputum found to be resistant to carbapenems & quinolones. Sputum isolated strep pneumoniae as well    11/28 urine and blood cultures are negative.  Cefepime completed on 12/06 & Giorgio nebs on 12/07/21  Failed TOV & teixeira was replaced for retention of 1000 ml of urine on 12/06/21 PM  Spiked a fever to 102F ? in response to retention  UA with 3 WBCs, no nit or LE, large blood   CXR with clear lungs  CTH revealed a Pseudomeningocele, s/p tap with gram stain without organism  CSF finalized as no growth  Blood & urine cx also finalized as no growth  Completed treatment for C diff w 2 wks of oral vanco on 12/10/21  Diarrhea resolved  Now with fever x 2-3 days, no clinical evidence of C Diff relapse, UA bland, and CXR is clear.  Repeat blood cultures and urine culture negative, sputum with MURF.  Source of fevers not clear   ? Small aspirations, ? occult hematoma  CT results reviewed no mention of an infectious process   blood cx no growth   urine cx no growth  respiratory cx reports normal bindu   remains afebrile     Suggest:  continue to monitor off antibiotics   continue supportive care as per primary team

## 2021-12-19 NOTE — PROGRESS NOTE ADULT - ASSESSMENT
61M with PMH of HTN, CAD s/p stent on DAPT, T2DM who complained of headache and subsequently became unresponsive found to have posterior fossa hemorrhage, IVH, hydrocephalus, s/p EVD and SOC on 11/4. 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 Course c/b respiratory failure and dysphagia s/p trach/PEG (11/19), VPS (11/23),Certas @4 completed course of cefepime for enterobacter and pseudomonas pneumonia on 11/21, fevers, c. diff colitis, urinary retention, b/l distal DVT. Trach changed 12/15 to #7 cuffless due to dislodgement. 12/16 CT A/P wo concern for infectious process. but concern for bladder lesion     PLAN:  Neuro:   - shunt  Certas @4  - PICA aneurysm resection  - pt is DNR  Respiratory:   - continue duoneb and mucomyst  - trach care, suction prn  - PE/DVT- on apixaban  CV:  - HTN- continue metoprolol  - on doxazosin for urinary retention  Endocrine:   - DMT2- continue fingersticks with NPH 12u q6  DVT ppx:   - on eliquis for dvt  Renal:   - hypernatremia- Na 145, continue free water 300q4  - MICHELLE has resolved  -Urinary retention- requiring straight cath q6  - Urology following for renal lesion, PSA/Cytology pending, GOC necessary for any urologic intervention  ID:   - recurrent fever- ID recs appreciated, Afebrile since 12/14  - Cautiously follow off antibiotics  - follow up CT scan of C/A/P to look for occult source of infection, found to have 2.4 cm nodular soft tissue density, concerning for bladder lesion, 1.7 cm left renal lesion  - CTX for pulmonary coverage if he deteriorates per ID  - completed course of po vanco for c diff  GI:    - tolerating tube feeds via PEG  PT/OT:   - CHUYITA    Per Hospitalist note it may be feasible at this time to monitor patient's progress for meaningful recovery over weeks, months and then decide whether there is benefit of pursuing workup of bladder lesion. Spouse in agreement.      [employer] # [id number]

## 2021-12-19 NOTE — PROGRESS NOTE ADULT - ASSESSMENT
62 yo male found with intracranial bleed and incidentally found possible bladder tumor. Straight catheterizing without hematuria   PSA 2.2   Creatinine 0.85    -please obtain urine cytology  -continue straight catheterizations  -monitor creatinine   -per primary team discussion with patient's wife would prefer to monitor neurologic recovery over time before deciding on  workup for bladder mass     Plan discussed with Dr. Ortiz

## 2021-12-19 NOTE — PROGRESS NOTE ADULT - SUBJECTIVE AND OBJECTIVE BOX
Subjective    Patient seen and examined. Patient sleeping and not able to awaken with voice.     Objective    Vital signs  T(F): , Max: 99.2 (12-19-21 @ 04:55)  HR: 89 (12-19-21 @ 04:55)  BP: 107/70 (12-19-21 @ 04:55)  SpO2: 100% (12-19-21 @ 04:55)  Wt(kg): --    Output     12-18 @ 07:01  -  12-19 @ 07:00  --------------------------------------------------------  IN: 1650 mL / OUT: 1600 mL / NET: 50 mL        General: NAD  Abdomen: soft/non-tender/non-distended  : intermittent catheterization. no reported hematuria, no blood at glans     Labs      12-19 @ 06:29    WBC --    / Hct --    / SCr 0.85       Urine Cx: pending    Imaging: no new imaging for review

## 2021-12-19 NOTE — PROGRESS NOTE ADULT - ASSESSMENT
61 year old man with respiratory failure d/t ICH    1. ICH  -per neurosurgery,  -s/p  shunt    2. Respiratory failure  -for tracheostomy, will require long term enteral nutrition  -s/p PEG, tolerating feeds, continue use of abdominal binder  - aspiration precautions     3. Enterobacter PNA  -s/p course of antibiotics     4. afib with RVR    5. Anemia, no overt GI bleed. EGD unremarkable.   -12/14: hgb dropped to 6.9 last night; received 1 unit of PRBCs- hgb this morning increased to 8.2  -12/15: Hgb 8.9 today  -trend CBC  -continue PPI  -monitor for GI bleeding as apixaban has been resumed     6. Cdiff infection   -s/p abx course  -PO vanco discontinued   -no additional episodes of diarrhea  -consider adding banatrol to feeds if diarrhea resumes     7. DVT on a/c  -apixaban 5mg resumed  -continue PPI     8. fever of 101.3F on 12/13  -BC and RVP negative/no growth  -ID following   -afebrile since 12/14          [facility]  Gastroenterology and Hepatology  [address] [place], [place]  Office: [phone]  Cell: [phone]

## 2021-12-19 NOTE — PROGRESS NOTE ADULT - SUBJECTIVE AND OBJECTIVE BOX
CC: f/u for fever, fevers have resolved     Patient  is non verbal. He is s/p drainage of ICH with requirement for VPS.    REVIEW OF SYSTEMS:  All other review of systems negative (Comprehensive ROS)    Antimicrobials Day #  :    Other Medications Reviewed  Home Medications:  aspirin 81 mg oral tablet: 1 tab(s) orally once a day (26 Nov 2021 15:57)  atorvastatin 80 mg oral tablet: 1 tab(s) orally once a day (at bedtime) (26 Nov 2021 15:57)  benazepril 20 mg oral tablet: 1 tab(s) orally once a day (26 Nov 2021 15:57)  chlorthalidone 25 mg oral tablet: 1 tab(s) orally once a day (26 Nov 2021 15:57)  metFORMIN 1000 mg oral tablet: 1 tab(s) orally 2 times a day (26 Nov 2021 15:57)  metoprolol succinate 25 mg oral tablet, extended release: 1 tab(s) orally once a day (26 Nov 2021 15:57)  pioglitazone 30 mg oral tablet: 1 tab(s) orally once a day (26 Nov 2021 15:57)  Plavix 75 mg oral tablet: 1 tab(s) orally once a day (26 Nov 2021 15:57)  Trulicity Pen 0.75 mg/0.5 mL subcutaneous solution: 1 application subcutaneous every 7 days (26 Nov 2021 15:57)    Vital Signs Last 24 Hrs  T(C): 36.8 (17 Dec 2021 11:08), Max: 37.1 (17 Dec 2021 00:06)  T(F): 98.2 (17 Dec 2021 11:08), Max: 98.7 (17 Dec 2021 00:06)  HR: 98 (17 Dec 2021 11:08) (89 - 102)  BP: 106/70 (17 Dec 2021 11:08) (103/65 - 118/78)  BP(mean): --  RR: 18 (17 Dec 2021 11:08) (16 - 20)  SpO2: 99% (17 Dec 2021 11:08) (98% - 99%)    PHYSICAL EXAM:  General: lethargic, no acute distress  Eyes:  anicteric, no conjunctival injection, no discharge  Oropharynx: no lesions or injection 	  Neck: supple, trach  Lungs: scattered ronchi  Heart: regular rate and rhythm; no murmur, rubs or gallops  Abdomen: soft, nondistended, nontender, without mass or organomegaly, peg in place  Skin: no lesions  Extremities: no clubbing, cyanosis, or edema  Neurologic: poorly interactive    LAB RESULTS:                                       8.2    11.74 )-----------( 280      ( 17 Dec 2021 07:32 )                 27.1     12-17    142  |  105  |  58<H>  ----------------------------<  198<H>  5.4<H>   |  26  |  0.90    Ca    8.8      17 Dec 2021 05:51                MICROBIOLOGY:  RECENT CULTURES:  12-14 @ 08:59 .Sputum Sputum     Normal Respiratory Susan present    Numerous polymorphonuclear leukocytes seen per low power field  Moderate Squamous epithelial cells seen per low power field  Numerous Gram positive cocci in pairs, chains and clusters seen per oil  power field  Moderate Gram Positive Rods seen per oil power field  Moderate Gram Negative Rods seen per oil power field    12-14 @ 06:49 Clean Catch Clean Catch (Midstream)     No growth      12-14 @ 02:12 .Blood Blood     No growth to date.      12-14 @ 02:11 .Blood Blood     No growth to date.          RADIOLOGY REVIEWED:  < from: Xray Chest 1 View- PORTABLE-Urgent (Xray Chest 1 View- PORTABLE-Urgent .) (12.13.21 @ 22:38) >  IMPRESSION: Clearlungs.    < end of copied text >  < from: CT Head No Cont (12.12.21 @ 15:38) >  INTERPRETATION:  CLINICAL INFORMATION: Altered mental status, evaluate   for hydrocephalus    TECHNIQUE: Noncontrast axial CT images were acquired through the head.   Two-dimensional sagittal and coronal reformats were generated.    COMPARISON STUDY: CT head 12/9/2012    FINDINGS:    Right parietal ventriculoperitoneal shunt catheter tip remains in the   right lateral ventricle. The ventricles are unchanged in size since   12/9/2021, with a slitlike right ventricle.    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    There is age-related cerebral volume loss. There is moderate patchy white   matter hypoattenuation which is nonspecific in etiology but likely   related to chronic microvascular changes. A postsurgical changes within   the left cerebellum. A persistent linear hyperdense material are noted   within the midline may represent hemorrhage or postsurgical material.   This appears similar to prior studies. No no no    The visualized paranasal sinuses are clear. The mastoid air cells and   middle ear cavities are clear.    The globes are unremarkable.    The soft tissues of the scalp are unremarkable. The calvarium is intact.    IMPRESSION:    Similar ventricular size when compared to 12/9/2021.    --- End of Report ---    < end of copied text >

## 2021-12-19 NOTE — PROGRESS NOTE ADULT - SUBJECTIVE AND OBJECTIVE BOX
SUBJECTIVE: Pt seen and examined, resting comfortably in bed, satting 100% on trach collar    OVERNIGHT EVENTS: none    Vital Signs Last 24 Hrs  T(C): 37.3 (19 Dec 2021 04:55), Max: 37.3 (19 Dec 2021 04:55)  T(F): 99.2 (19 Dec 2021 04:55), Max: 99.2 (19 Dec 2021 04:55)  HR: 89 (19 Dec 2021 04:55) (80 - 99)  BP: 107/70 (19 Dec 2021 04:55) (101/70 - 119/72)  BP(mean): --  RR: 18 (19 Dec 2021 04:55) (18 - 18)  SpO2: 100% (19 Dec 2021 04:55) (97% - 100%)    PHYSICAL EXAM:    General: No Acute Distress     Neurological: Awake, eyes open with downward gaze, not following commands this morning, trach collar, intermittently FC (squeezes hand), UE 0/5, b/l LE wds    Pulmonary: Clear to Auscultation, No Rales, No Rhonchi, No Wheezes     Cardiovascular: S1, S2, Regular Rate and Rhythm     Gastrointestinal: Soft, Nontender, Nondistended     Incision: healed incision    LABS:   12-19    142  |  104  |  47<H>  ----------------------------<  160<H>  4.3   |  26  |  0.85    Ca    8.7      19 Dec 2021 06:29          12-18 @ 07:01  -  12-19 @ 07:00  --------------------------------------------------------  IN: 1650 mL / OUT: 1600 mL / NET: 50 mL        MEDICATIONS:  Antibiotics:    Neuro:  acetaminophen    Suspension .. 650 milliGRAM(s) Oral every 6 hours PRN Temp greater or equal to 38C (100.4F), Mild Pain (1 - 3)  acetaminophen   IVPB .. 1000 milliGRAM(s) IV Intermittent once    Cardiac:  doxazosin 2 milliGRAM(s) Oral daily  metoprolol tartrate 12.5 milliGRAM(s) Oral two times a day    Pulm:  acetylcysteine 20%  Inhalation 4 milliLiter(s) Inhalation every 6 hours  albuterol/ipratropium for Nebulization 3 milliLiter(s) Nebulizer every 6 hours  sodium chloride 3%  Inhalation 3 milliLiter(s) Inhalation every 6 hours    GI/:  pantoprazole   Suspension 40 milliGRAM(s) Oral daily    Other:   apixaban 5 milliGRAM(s) Oral two times a day  artificial  tears Solution 1 Drop(s) Both EYES every 6 hours  atorvastatin 40 milliGRAM(s) Oral at bedtime  chlorhexidine 0.12% Liquid 15 milliLiter(s) Oral Mucosa two times a day  dextrose 5% + sodium chloride 0.45%. 1000 milliLiter(s) IV Continuous <Continuous>  dextrose 50% Injectable 25 Gram(s) IV Push once  dextrose 50% Injectable 12.5 Gram(s) IV Push once  insulin lispro (ADMELOG) corrective regimen sliding scale   SubCutaneous every 6 hours  insulin NPH human recombinant 12 Unit(s) SubCutaneous every 6 hours  multivitamin 1 Tablet(s) Oral daily  nystatin Powder 1 Application(s) Topical two times a day    DIET: [] Regular [] CCD [] Renal [] Puree [] Dysphagia [x] Tube Feeds:     IMAGING:    from: CT Head No Cont (12.12.21 @ 15:38) >    IMPRESSION:    Similar ventricular size when compared to 12/9/2021.    < end of copied text >  < from: VA Duplex Lower Ext Vein Scan, Bilat (12.08.21 @ 12:21) >    IMPRESSION:    Persistent bilateral below the knees deep venous thrombosis in the   bilateral calf veins. No propagation.    < end of copied text >  < from: CT Abdomen and Pelvis w/ IV Cont (12.16.21 @ 09:26) >    IMPRESSION:  A 2.4 cm nodular soft tissue density in the bladder appears separate from   the prostate gland, concerning for bladder lesion. Correlate with   cystoscopy.    A 2.7 cm fluid collection adjacent to the right ischial tuberosity, may   be related to ischiogluteal bursitis.    Indeterminant 1.7 cm left renal lesion. Contrast-enhanced MRI can be   performed further evaluation.    Mild fatty infiltration along the PEG tube catheter without drainable   fluid collection.    < end of copied text >

## 2021-12-20 PROCEDURE — 99232 SBSQ HOSP IP/OBS MODERATE 35: CPT

## 2021-12-20 RX ORDER — HUMAN INSULIN 100 [IU]/ML
13 INJECTION, SUSPENSION SUBCUTANEOUS EVERY 6 HOURS
Refills: 0 | Status: DISCONTINUED | OUTPATIENT
Start: 2021-12-20 | End: 2022-01-03

## 2021-12-20 RX ADMIN — SODIUM CHLORIDE 3 MILLILITER(S): 9 INJECTION INTRAMUSCULAR; INTRAVENOUS; SUBCUTANEOUS at 17:33

## 2021-12-20 RX ADMIN — ATORVASTATIN CALCIUM 40 MILLIGRAM(S): 80 TABLET, FILM COATED ORAL at 21:49

## 2021-12-20 RX ADMIN — Medication 4 MILLILITER(S): at 17:33

## 2021-12-20 RX ADMIN — CHLORHEXIDINE GLUCONATE 15 MILLILITER(S): 213 SOLUTION TOPICAL at 05:59

## 2021-12-20 RX ADMIN — Medication 3 MILLILITER(S): at 12:25

## 2021-12-20 RX ADMIN — CHLORHEXIDINE GLUCONATE 15 MILLILITER(S): 213 SOLUTION TOPICAL at 17:32

## 2021-12-20 RX ADMIN — Medication 3 MILLILITER(S): at 21:50

## 2021-12-20 RX ADMIN — Medication 1 DROP(S): at 05:51

## 2021-12-20 RX ADMIN — NYSTATIN CREAM 1 APPLICATION(S): 100000 CREAM TOPICAL at 17:31

## 2021-12-20 RX ADMIN — SODIUM CHLORIDE 3 MILLILITER(S): 9 INJECTION INTRAMUSCULAR; INTRAVENOUS; SUBCUTANEOUS at 06:00

## 2021-12-20 RX ADMIN — Medication 4 MILLILITER(S): at 21:50

## 2021-12-20 RX ADMIN — Medication 1 DROP(S): at 12:25

## 2021-12-20 RX ADMIN — HUMAN INSULIN 12 UNIT(S): 100 INJECTION, SUSPENSION SUBCUTANEOUS at 05:53

## 2021-12-20 RX ADMIN — NYSTATIN CREAM 1 APPLICATION(S): 100000 CREAM TOPICAL at 05:59

## 2021-12-20 RX ADMIN — Medication 2: at 17:35

## 2021-12-20 RX ADMIN — Medication 3 MILLILITER(S): at 05:51

## 2021-12-20 RX ADMIN — Medication 2: at 12:18

## 2021-12-20 RX ADMIN — Medication 2: at 05:54

## 2021-12-20 RX ADMIN — HUMAN INSULIN 13 UNIT(S): 100 INJECTION, SUSPENSION SUBCUTANEOUS at 17:34

## 2021-12-20 RX ADMIN — Medication 1 DROP(S): at 21:50

## 2021-12-20 RX ADMIN — Medication 1 TABLET(S): at 13:34

## 2021-12-20 RX ADMIN — Medication 2 MILLIGRAM(S): at 06:00

## 2021-12-20 RX ADMIN — APIXABAN 5 MILLIGRAM(S): 2.5 TABLET, FILM COATED ORAL at 06:00

## 2021-12-20 RX ADMIN — SODIUM CHLORIDE 3 MILLILITER(S): 9 INJECTION INTRAMUSCULAR; INTRAVENOUS; SUBCUTANEOUS at 21:49

## 2021-12-20 RX ADMIN — SODIUM CHLORIDE 3 MILLILITER(S): 9 INJECTION INTRAMUSCULAR; INTRAVENOUS; SUBCUTANEOUS at 12:25

## 2021-12-20 RX ADMIN — Medication 4 MILLILITER(S): at 05:58

## 2021-12-20 RX ADMIN — Medication 12.5 MILLIGRAM(S): at 06:01

## 2021-12-20 RX ADMIN — Medication 12.5 MILLIGRAM(S): at 17:34

## 2021-12-20 RX ADMIN — HUMAN INSULIN 13 UNIT(S): 100 INJECTION, SUSPENSION SUBCUTANEOUS at 12:18

## 2021-12-20 RX ADMIN — Medication 3 MILLILITER(S): at 17:34

## 2021-12-20 RX ADMIN — APIXABAN 5 MILLIGRAM(S): 2.5 TABLET, FILM COATED ORAL at 17:34

## 2021-12-20 RX ADMIN — Medication 4 MILLILITER(S): at 12:25

## 2021-12-20 RX ADMIN — Medication 1 DROP(S): at 17:33

## 2021-12-20 RX ADMIN — PANTOPRAZOLE SODIUM 40 MILLIGRAM(S): 20 TABLET, DELAYED RELEASE ORAL at 13:35

## 2021-12-20 NOTE — PROGRESS NOTE ADULT - SUBJECTIVE AND OBJECTIVE BOX
CC: f/u for  fever  Patient reports nothing     REVIEW OF SYSTEMS:  All other review of systems negative (Comprehensive ROS)cannot get    Antimicrobials Day #  :    Other Medications Reviewed    T(F): 99 (12-20-21 @ 20:09), Max: 99 (12-20-21 @ 20:09)  HR: 86 (12-20-21 @ 20:09)  BP: 106/66 (12-20-21 @ 20:09)  RR: 18 (12-20-21 @ 20:09)  SpO2: 100% (12-20-21 @ 20:09)  Wt(kg): --    PHYSICAL EXAM:  General: on tc , no acute distress  Eyes:  anicteric, no conjunctival injection, no discharge  Oropharynx: no lesions or injection 	  Neck: supple, without adenopathy, trach  Lungs: course  to auscultation  Heart: regular rate and rhythm; no murmur, rubs or gallops  Abdomen: soft, nondistended, nontender, without mass or organomegaly, peg  Skin: no lesions  Extremities: no clubbing, cyanosis, or edema  Neurologic: not responsive    LAB RESULTS:    12-19    142  |  104  |  47<H>  ----------------------------<  160<H>  4.3   |  26  |  0.85    Ca    8.7      19 Dec 2021 06:29          MICROBIOLOGY:  RECENT CULTURES:      RADIOLOGY REVIEWED:    < from: CT Abdomen and Pelvis w/ IV Cont (12.16.21 @ 09:26) >    IMPRESSION:  A 2.4 cm nodular soft tissue density in the bladder appears separate from   the prostate gland, concerning for bladder lesion. Correlate with   cystoscopy.    A 2.7 cm fluid collection adjacent to the right ischial tuberosity, may   be related to ischiogluteal bursitis.    Indeterminant 1.7 cm left renal lesion. Contrast-enhanced MRI can be   performed further evaluation.    Mild fatty infiltration along the PEG tube catheter without drainable   fluid collection.          < end of copied text >            Assessment:  patient admitted several weeks ago with cerebellar bleed, had decompressive craniectomy and c spine lami then pica aneurysm resection and vps. Had cdif about 4 weeks ago and fever with large trach secretions so treated with enteral vanco and systemic abx. Had a fever 6 days ago but no new infection found. He was however found to have a bladder lesion with further w/u to be determined.   Plan:  monitor off antibiotics

## 2021-12-20 NOTE — PROGRESS NOTE ADULT - ASSESSMENT
61M with PMH of HTN, CAD s/p stent on DAPT, T2DM who complained of headache and subsequently became unresponsive found to have posterior fossa hemorrhage, IVH, hydrocephalus, s/p EVD and SOC on 11/4. 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 Course c/b respiratory failure and dysphagia s/p trach/PEG (11/19), VPS (11/23),Certas @4 completed course of cefepime for enterobacter and pseudomonas pneumonia on 11/21, fevers, c. diff colitis, urinary retention, b/l distal DVT. Trach changed 12/15 to #7 cuffless due to dislodgement. 12/16 CT A/P wo concern for infectious process. but concern for bladder lesion     PLAN:  Neuro:   - shunt  Certas @4  - PICA aneurysm resection  - pt is DNR  Respiratory:   - continue duoneb and mucomyst  - trach care, suction prn  - PE/DVT- on apixaban  CV:  - HTN- continue metoprolol  - on doxazosin for urinary retention  Endocrine:   - DMT2- continue fingersticks with NPH 13u q6  DVT ppx:   - on eliquis for dvt  Renal:   - hypernatremia- Na 142, continue free water 200q6  - MICHELLE has resolved  -Urinary retention- requiring straight cath q6  - Urology following for renal lesion, PSA/Cytology pending, GOC necessary for any urologic intervention  ID:   - recurrent fever- ID recs appreciated, Afebrile since 12/14  - Cautiously follow off antibiotics  - follow up CT scan of C/A/P to look for occult source of infection, found to have 2.4 cm nodular soft tissue density, concerning for bladder lesion, 1.7 cm left renal lesion  - CTX for pulmonary coverage if he deteriorates per ID  - completed course of po vanco for c diff  GI:    - tolerating tube feeds via PEG  PT/OT:   - CHUYITA    Per Hospitalist note, it may be feasible at this time to monitor patient's progress for meaningful recovery over weeks, months and then decide whether there is benefit of pursuing workup of bladder lesion. Spouse in agreement.      Spectralink # [id number]   61M with PMH of HTN, CAD s/p stent on DAPT, T2DM who complained of headache and subsequently became unresponsive found to have posterior fossa hemorrhage, IVH, hydrocephalus, s/p EVD and SOC on 11/4. 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 Course c/b respiratory failure and dysphagia s/p trach/PEG (11/19), VPS (11/23),Certas @4 completed course of cefepime for enterobacter and pseudomonas pneumonia on 11/21, fevers, c. diff colitis, urinary retention, b/l distal DVT. Trach changed 12/15 to #7 cuffless due to dislodgement. 12/16 CT A/P wo concern for infectious process. but concern for bladder lesion     PLAN:  Neuro:   - shunt  Certas @4  - PICA aneurysm resection  - pt is DNR  Respiratory:   - continue duoneb and mucomyst  - trach care, suction prn  - PE/DVT- on apixaban  CV:  - HTN- continue metoprolol  - on doxazosin for urinary retention  Endocrine:   - DMT2- continue fingersticks with NPH 13u q6  DVT ppx:   - on eliquis for dvt  Renal:   - hypernatremia- Na 142, continue free water 200q6  - MICHELLE has resolved  -Urinary retention- requiring straight cath q6  - Urology following for renal lesion, PSA/Cytology pending, GOC necessary for any urologic intervention  - f/u urine cytology  ID:   - recurrent fever- ID recs appreciated, Afebrile since 12/14  - Cautiously follow off antibiotics  - follow up CT scan of C/A/P to look for occult source of infection, found to have 2.4 cm nodular soft tissue density, concerning for bladder lesion, 1.7 cm left renal lesion  - CTX for pulmonary coverage if he deteriorates per ID  - completed course of po vanco for c diff  GI:    - tolerating tube feeds via PEG  PT/OT:   - CHUYITA    Per Hospitalist note, it may be feasible at this time to monitor patient's progress for meaningful recovery over weeks, months and then decide whether there is benefit of pursuing workup of bladder lesion. Spouse in agreement.      Spectralink # [id number]

## 2021-12-20 NOTE — PROGRESS NOTE ADULT - SUBJECTIVE AND OBJECTIVE BOX
SUBJECTIVE: Pt seen and examined, resting comfortably in bed    OVERNIGHT EVENTS: none    Vital Signs Last 24 Hrs  T(C): 36.4 (20 Dec 2021 11:06), Max: 37.1 (19 Dec 2021 23:55)  T(F): 97.5 (20 Dec 2021 11:06), Max: 98.7 (19 Dec 2021 23:55)  HR: 88 (20 Dec 2021 11:06) (70 - 97)  BP: 110/74 (20 Dec 2021 11:06) (102/66 - 112/70)  BP(mean): --  RR: 18 (20 Dec 2021 11:06) (18 - 20)  SpO2: 100% (20 Dec 2021 11:06) (97% - 100%)    PHYSICAL EXAM:    General: No Acute Distress     Neurological: Awake, eyes open with downward gaze,  trach collar, intermittently FC (squeezes hand), UE 0/5, b/l LE wds    Pulmonary: Clear to Auscultation, No Rales, No Rhonchi, No Wheezes     Cardiovascular: S1, S2, Regular Rate and Rhythm     Gastrointestinal: Soft, Nontender, Nondistended     Incision:  healed incision    LABS:   12-19    142  |  104  |  47<H>  ----------------------------<  160<H>  4.3   |  26  |  0.85    Ca    8.7      19 Dec 2021 06:29          12-19 @ 07:01  -  12-20 @ 07:00  --------------------------------------------------------  IN: 2965 mL / OUT: 925 mL / NET: 2040 mL        MEDICATIONS:  Antibiotics:    Neuro:  acetaminophen    Suspension .. 650 milliGRAM(s) Oral every 6 hours PRN Temp greater or equal to 38C (100.4F), Mild Pain (1 - 3)  acetaminophen   IVPB .. 1000 milliGRAM(s) IV Intermittent once    Cardiac:  doxazosin 2 milliGRAM(s) Oral daily  metoprolol tartrate 12.5 milliGRAM(s) Oral two times a day    Pulm:  acetylcysteine 20%  Inhalation 4 milliLiter(s) Inhalation every 6 hours  albuterol/ipratropium for Nebulization 3 milliLiter(s) Nebulizer every 6 hours  sodium chloride 3%  Inhalation 3 milliLiter(s) Inhalation every 6 hours    GI/:  pantoprazole   Suspension 40 milliGRAM(s) Oral daily    Other:   apixaban 5 milliGRAM(s) Oral two times a day  artificial  tears Solution 1 Drop(s) Both EYES every 6 hours  atorvastatin 40 milliGRAM(s) Oral at bedtime  chlorhexidine 0.12% Liquid 15 milliLiter(s) Oral Mucosa two times a day  dextrose 50% Injectable 25 Gram(s) IV Push once  dextrose 50% Injectable 12.5 Gram(s) IV Push once  insulin lispro (ADMELOG) corrective regimen sliding scale   SubCutaneous every 6 hours  insulin NPH human recombinant 13 Unit(s) SubCutaneous every 6 hours  multivitamin 1 Tablet(s) Oral daily  nystatin Powder 1 Application(s) Topical two times a day    DIET: [x] Regular [] CCD [] Renal [] Puree [] Dysphagia [] Tube Feeds:     IMAGING:   IMAGING:    from: CT Head No Cont (12.12.21 @ 15:38) >    IMPRESSION:    Similar ventricular size when compared to 12/9/2021.    < end of copied text >  < from: VA Duplex Lower Ext Vein Scan, Bilat (12.08.21 @ 12:21) >    IMPRESSION:    Persistent bilateral below the knees deep venous thrombosis in the   bilateral calf veins. No propagation.    < end of copied text >  < from: CT Abdomen and Pelvis w/ IV Cont (12.16.21 @ 09:26) >    IMPRESSION:  A 2.4 cm nodular soft tissue density in the bladder appears separate from   the prostate gland, concerning for bladder lesion. Correlate with   cystoscopy.    A 2.7 cm fluid collection adjacent to the right ischial tuberosity, may   be related to ischiogluteal bursitis.    Indeterminant 1.7 cm left renal lesion. Contrast-enhanced MRI can be   performed further evaluation.    Mild fatty infiltration along the PEG tube catheter without drainable   fluid collection.    < end of copied text >

## 2021-12-20 NOTE — PROGRESS NOTE ADULT - PROBLEM SELECTOR PLAN 10
CT a/p with a 2.4 cm nodular soft tissue density in the bladder appears separate from the prostate gland, concerning for bladder lesion  previous hospitalist d/w urology - findings concerning for bladder tumor vs prostate gland  PSA normal, check urine cytology (pending in lab)  Spoke to urology team regarding cysto transurethral resection which requires general anesthesia. Per , recommend to f/u urine cytology result to determine next steps but it doesn't have to hold up the discharge.     Previous hospitalist spoke to the patient's wife, Sandrita Bauer [phone]. Given the patient's underlying comorbid conditions coupled with his recent devastating neurologic events, other medical complications that ensued, and his poor neurologic and functional status at this time, it may be prudent to monitor his progress and see if he makes any meaningful recovery over the coming weeks to months to decide whether the benefit of pursuing any invasive work up for the bladder lesion outweighs the risk. Spouse seems to be in agreement at the moment.

## 2021-12-20 NOTE — PROGRESS NOTE ADULT - SUBJECTIVE AND OBJECTIVE BOX
Chief Complaint:  Patient is a 61y old  Male who presents with a chief complaint of IVH/SAH (19 Dec 2021 09:14)      Date of service 12-20-21 @ 11:15      Interval Events:   Patient seen and examined. No diarrhea, melena, brbpr, abdominal distention. Tolerating TF. Normal BM last night     Hospital Medications:  acetaminophen    Suspension .. 650 milliGRAM(s) Oral every 6 hours PRN  acetaminophen   IVPB .. 1000 milliGRAM(s) IV Intermittent once  acetylcysteine 20%  Inhalation 4 milliLiter(s) Inhalation every 6 hours  albuterol/ipratropium for Nebulization 3 milliLiter(s) Nebulizer every 6 hours  apixaban 5 milliGRAM(s) Oral two times a day  artificial  tears Solution 1 Drop(s) Both EYES every 6 hours  atorvastatin 40 milliGRAM(s) Oral at bedtime  chlorhexidine 0.12% Liquid 15 milliLiter(s) Oral Mucosa two times a day  dextrose 50% Injectable 25 Gram(s) IV Push once  dextrose 50% Injectable 12.5 Gram(s) IV Push once  doxazosin 2 milliGRAM(s) Oral daily  insulin lispro (ADMELOG) corrective regimen sliding scale   SubCutaneous every 6 hours  insulin NPH human recombinant 13 Unit(s) SubCutaneous every 6 hours  metoprolol tartrate 12.5 milliGRAM(s) Oral two times a day  multivitamin 1 Tablet(s) Oral daily  nystatin Powder 1 Application(s) Topical two times a day  pantoprazole   Suspension 40 milliGRAM(s) Oral daily  sodium chloride 3%  Inhalation 3 milliLiter(s) Inhalation every 6 hours        Review of Systems:  unable to obtain    PHYSICAL EXAM:   Vital Signs:  Vital Signs Last 24 Hrs  T(C): 36.4 (20 Dec 2021 11:06), Max: 37.2 (19 Dec 2021 11:20)  T(F): 97.5 (20 Dec 2021 11:06), Max: 99 (19 Dec 2021 11:20)  HR: 88 (20 Dec 2021 11:06) (70 - 97)  BP: 110/74 (20 Dec 2021 11:06) (102/66 - 112/70)  BP(mean): --  RR: 18 (20 Dec 2021 11:06) (18 - 20)  SpO2: 100% (20 Dec 2021 11:06) (97% - 100%)  Daily     Daily       PHYSICAL EXAM:     GENERAL:  Appears stated age, well-groomed, well-nourished, no distress  HEENT:  NC/AT,  conjunctivae anicteric, clear and pink,   NECK: supple, trachea midline, +trach  CHEST:  Full & symmetric excursion, no increased effort, breath sounds clear  HEART:  Regular rhythm, no JVD  ABDOMEN:  Soft, non-tender, non-distended, normoactive bowel sounds,  no masses , no hepatosplenomegaly, +peg  EXTREMITIES:  no cyanosis,clubbing or edema  SKIN:  No rash, erythema, or, ecchymoses, no jaundice  NEURO:  Alert, non-focal, no asterixis  PSYCH: Appropriate affect, oriented to place and time  RECTAL: Deferred      LABS Personally reviewed by me:      12-19    142  |  104  |  47<H>  ----------------------------<  160<H>  4.3   |  26  |  0.85    Ca    8.7      19 Dec 2021 06:29                  Imaging personally reviewed by me:

## 2021-12-20 NOTE — PROGRESS NOTE ADULT - NSPROGADDITIONALINFOA_GEN_ALL_CORE
.  Gaby Gaitan MD  Division of Hospital Medicine  [facility]   Spectra: [phone]    DC planning to rehab.  Will follow periodically. Please call [phone] if new questions or concern arise.    Plan discussed with patient and neurosurgery NP Ciara.

## 2021-12-20 NOTE — PROGRESS NOTE ADULT - PROBLEM SELECTOR PLAN 11
DVT ppx: on eliquis for DVT  monitor BM- last BM 12/16    d/c planning to rehab in progress. please ensure that the patient's wife has appropriate follow up information and instructions with .

## 2021-12-20 NOTE — PROGRESS NOTE ADULT - ASSESSMENT
61 year old man with respiratory failure d/t ICH    1. ICH  -per neurosurgery,  -s/p  shunt    2. Respiratory failure  -for tracheostomy, will require long term enteral nutrition  -s/p PEG, tolerating feeds, continue use of abdominal binder  - aspiration precautions     3. Enterobacter PNA  -s/p course of antibiotics     4. Anemia, no overt GI bleed. EGD unremarkable.   -12/14: hgb dropped to 6.9 last night; received 1 unit of PRBCs- hgb this morning increased to 8.2  -12/15: Hgb 8.9 today  -H/H stable  -trend CBC  -continue PPI  -monitor for GI bleeding as apixaban has been resumed   -DC planning underway to rehab    5. Cdiff infection   -s/p abx course  -PO vanco discontinued   -no additional episodes of diarrhea  -consider adding banatrol to feeds if diarrhea resumes     6. DVT on a/c  -apixaban 5mg resumed  -continue PPI     7. fever of 101.3F on 12/13  -BC and RVP negative/no growth  -ID following   -afebrile today    8. possible bladder tumor  -care as per       Attending supervision statement: I have personally seen and examined the patient. I fully participated in the care of this patient. I have made amendments to the documentation where necessary, and agree with the history, physical exam, and plan as outlined by the ACP.          [facility]  Gastroenterology and Hepatology  [address] [place], [place]  Office: [phone]  Cell: [phone]

## 2021-12-20 NOTE — PROGRESS NOTE ADULT - SUBJECTIVE AND OBJECTIVE BOX
[facility] Division of Hospital Medicine  Gaby Gaitan MD  Pager (M-F, 6L-8E): [phone]  Other Times:  [phone]      Patient is a 61y old  Male who presents with a chief complaint of IVH/SAH (19 Dec 2021 09:14)      SUBJECTIVE / OVERNIGHT EVENTS: no acute events overnight. unable to obtain ROS as patient not verbal. though gives occasional thumbs up on left.   ADDITIONAL REVIEW OF SYSTEMS:    MEDICATIONS  (STANDING):  acetaminophen   IVPB .. 1000 milliGRAM(s) IV Intermittent once  acetylcysteine 20%  Inhalation 4 milliLiter(s) Inhalation every 6 hours  albuterol/ipratropium for Nebulization 3 milliLiter(s) Nebulizer every 6 hours  apixaban 5 milliGRAM(s) Oral two times a day  artificial  tears Solution 1 Drop(s) Both EYES every 6 hours  atorvastatin 40 milliGRAM(s) Oral at bedtime  chlorhexidine 0.12% Liquid 15 milliLiter(s) Oral Mucosa two times a day  dextrose 50% Injectable 25 Gram(s) IV Push once  dextrose 50% Injectable 12.5 Gram(s) IV Push once  doxazosin 2 milliGRAM(s) Oral daily  insulin lispro (ADMELOG) corrective regimen sliding scale   SubCutaneous every 6 hours  insulin NPH human recombinant 13 Unit(s) SubCutaneous every 6 hours  metoprolol tartrate 12.5 milliGRAM(s) Oral two times a day  multivitamin 1 Tablet(s) Oral daily  nystatin Powder 1 Application(s) Topical two times a day  pantoprazole   Suspension 40 milliGRAM(s) Oral daily  sodium chloride 3%  Inhalation 3 milliLiter(s) Inhalation every 6 hours    MEDICATIONS  (PRN):  acetaminophen    Suspension .. 650 milliGRAM(s) Oral every 6 hours PRN Temp greater or equal to 38C (100.4F), Mild Pain (1 - 3)      CAPILLARY BLOOD GLUCOSE      POCT Blood Glucose.: 186 mg/dL (20 Dec 2021 05:45)  POCT Blood Glucose.: 201 mg/dL (19 Dec 2021 23:18)  POCT Blood Glucose.: 181 mg/dL (19 Dec 2021 18:11)  POCT Blood Glucose.: 181 mg/dL (19 Dec 2021 17:20)  POCT Blood Glucose.: 196 mg/dL (19 Dec 2021 12:10)  POCT Blood Glucose.: 180 mg/dL (19 Dec 2021 11:57)    I&O's Summary    19 Dec 2021 07:01  -  20 Dec 2021 07:00  --------------------------------------------------------  IN: 2965 mL / OUT: 925 mL / NET: 2040 mL        PHYSICAL EXAM:  Vital Signs Last 24 Hrs  T(C): 36.4 (20 Dec 2021 11:06), Max: 37.2 (19 Dec 2021 11:20)  T(F): 97.5 (20 Dec 2021 11:06), Max: 99 (19 Dec 2021 11:20)  HR: 88 (20 Dec 2021 11:06) (70 - 97)  BP: 110/74 (20 Dec 2021 11:06) (102/66 - 112/70)  BP(mean): --  RR: 18 (20 Dec 2021 11:06) (18 - 20)  SpO2: 100% (20 Dec 2021 11:06) (97% - 100%)    CONSTITUTIONAL: NAD, well-developed, well-groomed  EYES: PERRLA; conjunctiva and sclera clear  ENMT: Moist oral mucosa, no pharyngeal injection or exudates; normal dentition  NECK: Supple, +trach site c/d/i  RESPIRATORY: Normal respiratory effort; lungs are clear to auscultation bilaterally  CARDIOVASCULAR: Regular rate and rhythm, normal S1 and S2, no murmur/rub/gallop; No lower extremity edema; Peripheral pulses are 2+ bilaterally  ABDOMEN: Soft, Nondistended, Nontender to palpation, normoactive bowel sounds, PEG site c/d/i  MUSCULOSKELETAL:  No clubbing or cyanosis of digits; no joint swelling or tenderness to palpation  PSYCH: Alert, opens eyes to verbal stimuli, unable to assess orientation as nonverbal  NEUROLOGY: able to give thumbs up on L hand, wiggles toes b/l on command  SKIN: No rashes; no palpable lesions    LABS:    12-19    142  |  104  |  47<H>  ----------------------------<  160<H>  4.3   |  26  |  0.85    Ca    8.7      19 Dec 2021 06:29      CAPILLARY BLOOD GLUCOSE      POCT Blood Glucose.: 186 mg/dL (20 Dec 2021 05:45)  POCT Blood Glucose.: 201 mg/dL (19 Dec 2021 23:18)  POCT Blood Glucose.: 181 mg/dL (19 Dec 2021 18:11)  POCT Blood Glucose.: 181 mg/dL (19 Dec 2021 17:20)  POCT Blood Glucose.: 196 mg/dL (19 Dec 2021 12:10)  POCT Blood Glucose.: 180 mg/dL (19 Dec 2021 11:57)      RADIOLOGY & ADDITIONAL TESTS:  Results Reviewed: -200s  Imaging Personally Reviewed:  Electrocardiogram Personally Reviewed:    COORDINATION OF CARE:  Care Discussed with Consultants/Other Providers [Y]: neurosurgery LUIS Urbina  Prior or Outpatient Records Reviewed [Y/N]:

## 2021-12-21 LAB — NON-GYNECOLOGICAL CYTOLOGY STUDY: SIGNIFICANT CHANGE UP

## 2021-12-21 RX ADMIN — SODIUM CHLORIDE 3 MILLILITER(S): 9 INJECTION INTRAMUSCULAR; INTRAVENOUS; SUBCUTANEOUS at 17:12

## 2021-12-21 RX ADMIN — SODIUM CHLORIDE 3 MILLILITER(S): 9 INJECTION INTRAMUSCULAR; INTRAVENOUS; SUBCUTANEOUS at 11:36

## 2021-12-21 RX ADMIN — Medication 2: at 05:09

## 2021-12-21 RX ADMIN — Medication 3 MILLILITER(S): at 17:12

## 2021-12-21 RX ADMIN — Medication 12.5 MILLIGRAM(S): at 17:14

## 2021-12-21 RX ADMIN — HUMAN INSULIN 13 UNIT(S): 100 INJECTION, SUSPENSION SUBCUTANEOUS at 17:14

## 2021-12-21 RX ADMIN — ATORVASTATIN CALCIUM 40 MILLIGRAM(S): 80 TABLET, FILM COATED ORAL at 22:27

## 2021-12-21 RX ADMIN — Medication 1 DROP(S): at 11:36

## 2021-12-21 RX ADMIN — HUMAN INSULIN 13 UNIT(S): 100 INJECTION, SUSPENSION SUBCUTANEOUS at 11:37

## 2021-12-21 RX ADMIN — Medication 1 DROP(S): at 17:13

## 2021-12-21 RX ADMIN — Medication 1 TABLET(S): at 11:37

## 2021-12-21 RX ADMIN — Medication 4 MILLILITER(S): at 17:13

## 2021-12-21 RX ADMIN — Medication 1 DROP(S): at 05:07

## 2021-12-21 RX ADMIN — CHLORHEXIDINE GLUCONATE 15 MILLILITER(S): 213 SOLUTION TOPICAL at 14:57

## 2021-12-21 RX ADMIN — HUMAN INSULIN 13 UNIT(S): 100 INJECTION, SUSPENSION SUBCUTANEOUS at 05:10

## 2021-12-21 RX ADMIN — Medication 2: at 17:13

## 2021-12-21 RX ADMIN — PANTOPRAZOLE SODIUM 40 MILLIGRAM(S): 20 TABLET, DELAYED RELEASE ORAL at 11:37

## 2021-12-21 RX ADMIN — CHLORHEXIDINE GLUCONATE 15 MILLILITER(S): 213 SOLUTION TOPICAL at 05:08

## 2021-12-21 RX ADMIN — Medication 4 MILLILITER(S): at 05:07

## 2021-12-21 RX ADMIN — Medication 3 MILLILITER(S): at 05:07

## 2021-12-21 RX ADMIN — Medication 4 MILLILITER(S): at 11:37

## 2021-12-21 RX ADMIN — APIXABAN 5 MILLIGRAM(S): 2.5 TABLET, FILM COATED ORAL at 17:14

## 2021-12-21 RX ADMIN — Medication 2 MILLIGRAM(S): at 05:06

## 2021-12-21 RX ADMIN — APIXABAN 5 MILLIGRAM(S): 2.5 TABLET, FILM COATED ORAL at 05:06

## 2021-12-21 RX ADMIN — Medication 2: at 00:23

## 2021-12-21 RX ADMIN — Medication 12.5 MILLIGRAM(S): at 05:06

## 2021-12-21 RX ADMIN — SODIUM CHLORIDE 3 MILLILITER(S): 9 INJECTION INTRAMUSCULAR; INTRAVENOUS; SUBCUTANEOUS at 05:07

## 2021-12-21 RX ADMIN — Medication 3 MILLILITER(S): at 11:36

## 2021-12-21 RX ADMIN — NYSTATIN CREAM 1 APPLICATION(S): 100000 CREAM TOPICAL at 14:59

## 2021-12-21 RX ADMIN — NYSTATIN CREAM 1 APPLICATION(S): 100000 CREAM TOPICAL at 05:08

## 2021-12-21 RX ADMIN — HUMAN INSULIN 13 UNIT(S): 100 INJECTION, SUSPENSION SUBCUTANEOUS at 00:23

## 2021-12-21 NOTE — PROGRESS NOTE ADULT - ASSESSMENT
61 year old man with respiratory failure d/t ICH    1. ICH  -per neurosurgery,  -s/p  shunt    2. Respiratory failure  -for tracheostomy, will require long term enteral nutrition  -s/p PEG, tolerating feeds, continue use of abdominal binder  - aspiration precautions     3. Enterobacter PNA  -s/p course of antibiotics     4. Anemia, no overt GI bleed. EGD unremarkable.   -12/14: hgb dropped to 6.9 last night; received 1 unit of PRBCs- hgb this morning increased to 8.2  -12/15: Hgb 8.9 today  -H/H stable  -trend CBC  -continue PPI  -monitor for GI bleeding as patient is on apixaban  -DC planning underway to rehab    5. Cdiff infection   -s/p abx course  -PO vanco discontinued   -diarrhea resolved; normal BM last night   -consider adding banatrol to feeds if diarrhea resumes     6. DVT on a/c  -apixaban 5mg resumed  -continue PPI     7. fever of 101.3F on 12/13  -BC and RVP negative/no growth  -ID following   -afebrile today    8. possible bladder tumor  -care as per       Attending supervision statement: I have personally seen and examined the patient. I fully participated in the care of this patient. I have made amendments to the documentation where necessary, and agree with the history, physical exam, and plan as outlined by the ACP.          [facility]  Gastroenterology and Hepatology  [address] [place], [place]  Office: [phone]  Cell: [phone]   61 year old man with respiratory failure d/t ICH    1. ICH  -per neurosurgery,  -s/p  shunt    2. Respiratory failure  -for tracheostomy, will require long term enteral nutrition  -s/p PEG, tolerating feeds, continue use of abdominal binder  - aspiration precautions     3. Enterobacter PNA  -s/p course of antibiotics     4. Anemia, no overt GI bleed. EGD unremarkable.   -12/14: hgb dropped to 6.9 last night; received 1 unit of PRBCs- hgb this morning increased to 8.2  -12/15: Hgb 8.9 today  -H/H stable  -trend CBC  -continue PPI  -monitor for GI bleeding as patient is on apixaban  -DC planning underway to rehab    5. Cdiff infection   -s/p abx course  -PO vanco discontinued   -diarrhea resolved; normal BM last night   -consider adding banatrol to feeds if diarrhea resumes     6. DVT on a/c  -apixaban 5mg resumed  -continue PPI     7. fever of 101.3F on 12/13  -BC and RVP negative/no growth  -ID following   -afebrile today    8. possible bladder tumor  -care as per   -cytology pending       Attending supervision statement: I have personally seen and examined the patient. I fully participated in the care of this patient. I have made amendments to the documentation where necessary, and agree with the history, physical exam, and plan as outlined by the ACP.          [facility]  Gastroenterology and Hepatology  [address] [place], [place]  Office: [phone]  Cell: [phone]

## 2021-12-21 NOTE — PROGRESS NOTE ADULT - ASSESSMENT
61M with PMH of HTN, CAD s/p stent on DAPT, T2DM who complained of headache and subsequently became unresponsive found to have posterior fossa hemorrhage, IVH, hydrocephalus, s/p EVD and SOC on 11/4. 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 Course c/b respiratory failure and dysphagia s/p trach/PEG (11/19), VPS (11/23),Certas @4 completed course of cefepime for enterobacter and pseudomonas pneumonia on 11/21, fevers, c. diff colitis, urinary retention, b/l distal DVT. Trach changed 12/15 to #7 cuffless due to dislodgement. 12/16 CT A/P wo concern for infectious process. but concern for bladder lesion     PLAN:  Neuro:   - shunt  Certas @4  - PICA aneurysm resection  - pt is DNR  Respiratory:   - continue duoneb and mucomyst  - trach care, suction prn  - PE/DVT- on apixaban  CV:  - HTN- continue metoprolol  - on doxazosin for urinary retention  Endocrine:   - DMT2- continue fingersticks with NPH 13u q6  DVT ppx:   - on eliquis for dvt  Renal:   - hypernatremia- Na 142, continue free water 200q6  - MICHELLE has resolved  -Urinary retention- requiring straight cath q6  - Urology following for renal lesion, PSA/Cytology pending, GOC necessary for any urologic intervention  - f/u urine cytology  ID:   - recurrent fever- ID recs appreciated, Afebrile since 12/14  - Cautiously follow off antibiotics  - follow up CT scan of C/A/P to look for occult source of infection, found to have 2.4 cm nodular soft tissue density, concerning for bladder lesion, 1.7 cm left renal lesion  - CTX for pulmonary coverage if he deteriorates per ID  - completed course of po vanco for c diff  GI:    - tolerating tube feeds via PEG  PT/OT:   - CHUYITA    Per Hospitalist note, it may be feasible at this time to monitor patient's progress for meaningful recovery over weeks, months and then decide whether there is benefit of pursuing workup of bladder lesion. Spouse in agreement.  updated wife at bedside    will discuss with Dr Camilo Ambrose # [id number]

## 2021-12-21 NOTE — PROGRESS NOTE ADULT - SUBJECTIVE AND OBJECTIVE BOX
Chief Complaint:  Patient is a 61y old  Male who presents with a chief complaint of bleed (20 Dec 2021 20:55)      Date of service 12-21-21 @ 10:38      Interval Events:   Patient seen and examined. Patient lethargic, opens eyes. No diarrhea, melena, brbpr. Afebrile. Last BM 12/20    Hospital Medications:  acetaminophen    Suspension .. 650 milliGRAM(s) Oral every 6 hours PRN  acetaminophen   IVPB .. 1000 milliGRAM(s) IV Intermittent once  acetylcysteine 20%  Inhalation 4 milliLiter(s) Inhalation every 6 hours  albuterol/ipratropium for Nebulization 3 milliLiter(s) Nebulizer every 6 hours  apixaban 5 milliGRAM(s) Oral two times a day  artificial  tears Solution 1 Drop(s) Both EYES every 6 hours  atorvastatin 40 milliGRAM(s) Oral at bedtime  chlorhexidine 0.12% Liquid 15 milliLiter(s) Oral Mucosa two times a day  dextrose 50% Injectable 25 Gram(s) IV Push once  dextrose 50% Injectable 12.5 Gram(s) IV Push once  doxazosin 2 milliGRAM(s) Oral daily  insulin lispro (ADMELOG) corrective regimen sliding scale   SubCutaneous every 6 hours  insulin NPH human recombinant 13 Unit(s) SubCutaneous every 6 hours  metoprolol tartrate 12.5 milliGRAM(s) Oral two times a day  multivitamin 1 Tablet(s) Oral daily  nystatin Powder 1 Application(s) Topical two times a day  pantoprazole   Suspension 40 milliGRAM(s) Oral daily  sodium chloride 3%  Inhalation 3 milliLiter(s) Inhalation every 6 hours        Review of Systems:  unable to obtain     PHYSICAL EXAM:   Vital Signs:  Vital Signs Last 24 Hrs  T(C): 36.7 (21 Dec 2021 08:00), Max: 37.7 (21 Dec 2021 04:31)  T(F): 98 (21 Dec 2021 08:00), Max: 99.9 (21 Dec 2021 04:31)  HR: 68 (21 Dec 2021 08:00) (68 - 96)  BP: 103/67 (21 Dec 2021 08:00) (95/60 - 110/74)  BP(mean): --  RR: 20 (21 Dec 2021 08:00) (18 - 20)  SpO2: 98% (21 Dec 2021 08:00) (98% - 100%)  Daily     Daily       PHYSICAL EXAM:     GENERAL:  Appears stated age, well-groomed, well-nourished, no distress  HEENT:  NC/AT,  conjunctivae anicteric, clear and pink,   NECK: supple, trachea midline, +trach collar   CHEST:  Full & symmetric excursion, no increased effort, breath sounds clear  HEART:  Regular rhythm, no JVD  ABDOMEN:  Soft, non-tender, non-distended, normoactive bowel sounds,  no masses , no hepatosplenomegaly, +peg   EXTREMITIES:  no cyanosis,clubbing or edema  SKIN:  No rash, erythema, or, ecchymoses, no jaundice  NEURO:  non-focal, no asterixi  RECTAL: Deferred      LABS Personally reviewed by me:                        Imaging personally reviewed by me:

## 2021-12-21 NOTE — PROGRESS NOTE ADULT - SUBJECTIVE AND OBJECTIVE BOX
SUBJECTIVE: Pt seen and examined, resting comfortably in bed in NAD    OVERNIGHT EVENTS: none    Vital Signs Last 24 Hrs  T(C): 37.8 (21 Dec 2021 15:00), Max: 37.8 (21 Dec 2021 15:00)  T(F): 100 (21 Dec 2021 15:00), Max: 100 (21 Dec 2021 15:00)  HR: 96 (21 Dec 2021 15:00) (68 - 96)  BP: 112/69 (21 Dec 2021 15:00) (95/60 - 112/69)  BP(mean): --  RR: 20 (21 Dec 2021 15:00) (18 - 20)  SpO2: 100% (21 Dec 2021 15:00) (98% - 100%)    PHYSICAL EXAM:    General: No Acute Distress     Neurological: Awake, eyes open with downward gaze,  trach collar, intermittently FC (squeezes hand), UE 0/5, b/l LE wds    Pulmonary: Clear to Auscultation, No Rales, No Rhonchi, No Wheezes     Cardiovascular: S1, S2, Regular Rate and Rhythm     Gastrointestinal: Soft, Nontender, Nondistended     Incision:  healed incision    LABS:   12-19    142  |  104  |  47<H>  ----------------------------<  160<H>  4.3   |  26  |  0.85    Ca    8.7      19 Dec 2021 06:29    12-19 @ 07:01  -  12-20 @ 07:00  --------------------------------------------------------  IN: 2965 mL / OUT: 925 mL / NET: 2040 mL    MEDICATIONS:    Neuro:  acetaminophen    Suspension .. 650 milliGRAM(s) Oral every 6 hours PRN Temp greater or equal to 38C (100.4F), Mild Pain (1 - 3)  acetaminophen   IVPB .. 1000 milliGRAM(s) IV Intermittent once    Cardiac:  doxazosin 2 milliGRAM(s) Oral daily  metoprolol tartrate 12.5 milliGRAM(s) Oral two times a day    Pulm:  acetylcysteine 20%  Inhalation 4 milliLiter(s) Inhalation every 6 hours  albuterol/ipratropium for Nebulization 3 milliLiter(s) Nebulizer every 6 hours  sodium chloride 3%  Inhalation 3 milliLiter(s) Inhalation every 6 hours    GI/:  pantoprazole   Suspension 40 milliGRAM(s) Oral daily    Other:   apixaban 5 milliGRAM(s) Oral two times a day  artificial  tears Solution 1 Drop(s) Both EYES every 6 hours  atorvastatin 40 milliGRAM(s) Oral at bedtime  chlorhexidine 0.12% Liquid 15 milliLiter(s) Oral Mucosa two times a day  dextrose 50% Injectable 25 Gram(s) IV Push once  dextrose 50% Injectable 12.5 Gram(s) IV Push once  insulin lispro (ADMELOG) corrective regimen sliding scale   SubCutaneous every 6 hours  insulin NPH human recombinant 13 Unit(s) SubCutaneous every 6 hours  multivitamin 1 Tablet(s) Oral daily  nystatin Powder 1 Application(s) Topical two times a day    DIET: [x] Regular [] CCD [] Renal [] Puree [] Dysphagia [] Tube Feeds:     IMAGING:   IMAGING:    from: CT Head No Cont (12.12.21 @ 15:38) >    IMPRESSION:    Similar ventricular size when compared to 12/9/2021.    < end of copied text >  < from: [facility] Duplex Lower Ext Vein Scan, Bilat (12.08.21 @ 12:21) >    IMPRESSION:    Persistent bilateral below the knees deep venous thrombosis in the   bilateral calf veins. No propagation.    < end of copied text >  < from: CT Abdomen and Pelvis w/ IV Cont (12.16.21 @ 09:26) >    IMPRESSION:  A 2.4 cm nodular soft tissue density in the bladder appears separate from   the prostate gland, concerning for bladder lesion. Correlate with   cystoscopy.    A 2.7 cm fluid collection adjacent to the right ischial tuberosity, may   be related to ischiogluteal bursitis.    Indeterminant 1.7 cm left renal lesion. Contrast-enhanced MRI can be   performed further evaluation.    Mild fatty infiltration along the PEG tube catheter without drainable   fluid collection.    < end of copied text >

## 2021-12-22 LAB
ANION GAP SERPL CALC-SCNC: 10 MMOL/L — SIGNIFICANT CHANGE UP (ref 5–17)
APPEARANCE UR: CLEAR — SIGNIFICANT CHANGE UP
BACTERIA # UR AUTO: NEGATIVE — SIGNIFICANT CHANGE UP
BASOPHILS # BLD AUTO: 0.02 K/UL — SIGNIFICANT CHANGE UP (ref 0–0.2)
BASOPHILS NFR BLD AUTO: 0.2 % — SIGNIFICANT CHANGE UP (ref 0–2)
BILIRUB UR-MCNC: NEGATIVE — SIGNIFICANT CHANGE UP
BLD GP AB SCN SERPL QL: NEGATIVE — SIGNIFICANT CHANGE UP
BUN SERPL-MCNC: 32 MG/DL — HIGH (ref 7–23)
CALCIUM SERPL-MCNC: 8.8 MG/DL — SIGNIFICANT CHANGE UP (ref 8.4–10.5)
CHLORIDE SERPL-SCNC: 101 MMOL/L — SIGNIFICANT CHANGE UP (ref 96–108)
CO2 SERPL-SCNC: 30 MMOL/L — SIGNIFICANT CHANGE UP (ref 22–31)
COLOR SPEC: YELLOW — SIGNIFICANT CHANGE UP
CREAT SERPL-MCNC: 0.82 MG/DL — SIGNIFICANT CHANGE UP (ref 0.5–1.3)
DIFF PNL FLD: ABNORMAL
EOSINOPHIL # BLD AUTO: 0.11 K/UL — SIGNIFICANT CHANGE UP (ref 0–0.5)
EOSINOPHIL NFR BLD AUTO: 1.1 % — SIGNIFICANT CHANGE UP (ref 0–6)
EPI CELLS # UR: 2 /HPF — SIGNIFICANT CHANGE UP
GLUCOSE SERPL-MCNC: 129 MG/DL — HIGH (ref 70–99)
GLUCOSE UR QL: NEGATIVE — SIGNIFICANT CHANGE UP
HCT VFR BLD CALC: 22 % — LOW (ref 39–50)
HCT VFR BLD CALC: 23.4 % — LOW (ref 39–50)
HGB BLD-MCNC: 6.8 G/DL — CRITICAL LOW (ref 13–17)
HGB BLD-MCNC: 7.3 G/DL — LOW (ref 13–17)
HYALINE CASTS # UR AUTO: 3 /LPF — HIGH (ref 0–2)
IMM GRANULOCYTES NFR BLD AUTO: 0.5 % — SIGNIFICANT CHANGE UP (ref 0–1.5)
KETONES UR-MCNC: NEGATIVE — SIGNIFICANT CHANGE UP
LACTATE SERPL-SCNC: 1.1 MMOL/L — SIGNIFICANT CHANGE UP (ref 0.7–2)
LEUKOCYTE ESTERASE UR-ACNC: ABNORMAL
LYMPHOCYTES # BLD AUTO: 1.85 K/UL — SIGNIFICANT CHANGE UP (ref 1–3.3)
LYMPHOCYTES # BLD AUTO: 19.1 % — SIGNIFICANT CHANGE UP (ref 13–44)
MCHC RBC-ENTMCNC: 29.1 PG — SIGNIFICANT CHANGE UP (ref 27–34)
MCHC RBC-ENTMCNC: 29.2 PG — SIGNIFICANT CHANGE UP (ref 27–34)
MCHC RBC-ENTMCNC: 30.9 GM/DL — LOW (ref 32–36)
MCHC RBC-ENTMCNC: 31.2 GM/DL — LOW (ref 32–36)
MCV RBC AUTO: 93.6 FL — SIGNIFICANT CHANGE UP (ref 80–100)
MCV RBC AUTO: 94 FL — SIGNIFICANT CHANGE UP (ref 80–100)
MONOCYTES # BLD AUTO: 1.08 K/UL — HIGH (ref 0–0.9)
MONOCYTES NFR BLD AUTO: 11.2 % — SIGNIFICANT CHANGE UP (ref 2–14)
NEUTROPHILS # BLD AUTO: 6.56 K/UL — SIGNIFICANT CHANGE UP (ref 1.8–7.4)
NEUTROPHILS NFR BLD AUTO: 67.9 % — SIGNIFICANT CHANGE UP (ref 43–77)
NITRITE UR-MCNC: NEGATIVE — SIGNIFICANT CHANGE UP
NRBC # BLD: 0 /100 WBCS — SIGNIFICANT CHANGE UP (ref 0–0)
NRBC # BLD: 0 /100 WBCS — SIGNIFICANT CHANGE UP (ref 0–0)
PH UR: 6 — SIGNIFICANT CHANGE UP (ref 5–8)
PLATELET # BLD AUTO: 302 K/UL — SIGNIFICANT CHANGE UP (ref 150–400)
PLATELET # BLD AUTO: 399 K/UL — SIGNIFICANT CHANGE UP (ref 150–400)
POTASSIUM SERPL-MCNC: 3.8 MMOL/L — SIGNIFICANT CHANGE UP (ref 3.5–5.3)
POTASSIUM SERPL-SCNC: 3.8 MMOL/L — SIGNIFICANT CHANGE UP (ref 3.5–5.3)
PROT UR-MCNC: ABNORMAL
RBC # BLD: 2.34 M/UL — LOW (ref 4.2–5.8)
RBC # BLD: 2.5 M/UL — LOW (ref 4.2–5.8)
RBC # FLD: 13 % — SIGNIFICANT CHANGE UP (ref 10.3–14.5)
RBC # FLD: 13.2 % — SIGNIFICANT CHANGE UP (ref 10.3–14.5)
RBC CASTS # UR COMP ASSIST: 15 /HPF — HIGH (ref 0–4)
RH IG SCN BLD-IMP: POSITIVE — SIGNIFICANT CHANGE UP
SODIUM SERPL-SCNC: 141 MMOL/L — SIGNIFICANT CHANGE UP (ref 135–145)
SP GR SPEC: 1.02 — SIGNIFICANT CHANGE UP (ref 1.01–1.02)
UROBILINOGEN FLD QL: NEGATIVE — SIGNIFICANT CHANGE UP
WBC # BLD: 10.46 K/UL — SIGNIFICANT CHANGE UP (ref 3.8–10.5)
WBC # BLD: 9.67 K/UL — SIGNIFICANT CHANGE UP (ref 3.8–10.5)
WBC # FLD AUTO: 10.46 K/UL — SIGNIFICANT CHANGE UP (ref 3.8–10.5)
WBC # FLD AUTO: 9.67 K/UL — SIGNIFICANT CHANGE UP (ref 3.8–10.5)
WBC UR QL: 8 /HPF — HIGH (ref 0–5)

## 2021-12-22 PROCEDURE — 71045 X-RAY EXAM CHEST 1 VIEW: CPT | Mod: 26,59

## 2021-12-22 RX ORDER — SODIUM CHLORIDE 9 MG/ML
1000 INJECTION INTRAMUSCULAR; INTRAVENOUS; SUBCUTANEOUS ONCE
Refills: 0 | Status: COMPLETED | OUTPATIENT
Start: 2021-12-22 | End: 2021-12-22

## 2021-12-22 RX ORDER — LOPERAMIDE HCL 2 MG
4 TABLET ORAL
Refills: 0 | Status: DISCONTINUED | OUTPATIENT
Start: 2021-12-22 | End: 2021-12-22

## 2021-12-22 RX ORDER — ACETAMINOPHEN 500 MG
1000 TABLET ORAL ONCE
Refills: 0 | Status: COMPLETED | OUTPATIENT
Start: 2021-12-22 | End: 2021-12-23

## 2021-12-22 RX ORDER — VANCOMYCIN HCL 1 G
125 VIAL (EA) INTRAVENOUS EVERY 6 HOURS
Refills: 0 | Status: DISCONTINUED | OUTPATIENT
Start: 2021-12-22 | End: 2022-01-01

## 2021-12-22 RX ORDER — PSYLLIUM SEED (WITH DEXTROSE)
1 POWDER (GRAM) ORAL DAILY
Refills: 0 | Status: DISCONTINUED | OUTPATIENT
Start: 2021-12-22 | End: 2021-12-22

## 2021-12-22 RX ORDER — PSYLLIUM SEED (WITH DEXTROSE)
1 POWDER (GRAM) ORAL
Refills: 0 | Status: DISCONTINUED | OUTPATIENT
Start: 2021-12-22 | End: 2021-12-22

## 2021-12-22 RX ADMIN — Medication 1 DROP(S): at 11:46

## 2021-12-22 RX ADMIN — PANTOPRAZOLE SODIUM 40 MILLIGRAM(S): 20 TABLET, DELAYED RELEASE ORAL at 11:48

## 2021-12-22 RX ADMIN — Medication 3 MILLILITER(S): at 05:37

## 2021-12-22 RX ADMIN — SODIUM CHLORIDE 3 MILLILITER(S): 9 INJECTION INTRAMUSCULAR; INTRAVENOUS; SUBCUTANEOUS at 00:45

## 2021-12-22 RX ADMIN — Medication 400 MILLIGRAM(S): at 20:51

## 2021-12-22 RX ADMIN — Medication 3 MILLILITER(S): at 00:46

## 2021-12-22 RX ADMIN — Medication 4 MILLILITER(S): at 05:38

## 2021-12-22 RX ADMIN — APIXABAN 5 MILLIGRAM(S): 2.5 TABLET, FILM COATED ORAL at 17:17

## 2021-12-22 RX ADMIN — SODIUM CHLORIDE 1000 MILLILITER(S): 9 INJECTION INTRAMUSCULAR; INTRAVENOUS; SUBCUTANEOUS at 22:34

## 2021-12-22 RX ADMIN — Medication 3 MILLILITER(S): at 17:15

## 2021-12-22 RX ADMIN — Medication 3 MILLILITER(S): at 22:35

## 2021-12-22 RX ADMIN — Medication 1 TABLET(S): at 11:48

## 2021-12-22 RX ADMIN — Medication 1 DROP(S): at 22:35

## 2021-12-22 RX ADMIN — Medication 3 MILLILITER(S): at 11:47

## 2021-12-22 RX ADMIN — NYSTATIN CREAM 1 APPLICATION(S): 100000 CREAM TOPICAL at 17:16

## 2021-12-22 RX ADMIN — HUMAN INSULIN 13 UNIT(S): 100 INJECTION, SUSPENSION SUBCUTANEOUS at 12:01

## 2021-12-22 RX ADMIN — APIXABAN 5 MILLIGRAM(S): 2.5 TABLET, FILM COATED ORAL at 05:36

## 2021-12-22 RX ADMIN — HUMAN INSULIN 13 UNIT(S): 100 INJECTION, SUSPENSION SUBCUTANEOUS at 17:47

## 2021-12-22 RX ADMIN — Medication 1 DROP(S): at 05:36

## 2021-12-22 RX ADMIN — Medication 2: at 05:42

## 2021-12-22 RX ADMIN — ATORVASTATIN CALCIUM 40 MILLIGRAM(S): 80 TABLET, FILM COATED ORAL at 22:36

## 2021-12-22 RX ADMIN — Medication 4 MILLILITER(S): at 22:36

## 2021-12-22 RX ADMIN — Medication 2 MILLIGRAM(S): at 05:36

## 2021-12-22 RX ADMIN — Medication 1 DROP(S): at 17:47

## 2021-12-22 RX ADMIN — Medication 12.5 MILLIGRAM(S): at 17:17

## 2021-12-22 RX ADMIN — HUMAN INSULIN 13 UNIT(S): 100 INJECTION, SUSPENSION SUBCUTANEOUS at 05:41

## 2021-12-22 RX ADMIN — Medication 4 MILLILITER(S): at 00:46

## 2021-12-22 RX ADMIN — SODIUM CHLORIDE 3 MILLILITER(S): 9 INJECTION INTRAMUSCULAR; INTRAVENOUS; SUBCUTANEOUS at 12:04

## 2021-12-22 RX ADMIN — SODIUM CHLORIDE 3 MILLILITER(S): 9 INJECTION INTRAMUSCULAR; INTRAVENOUS; SUBCUTANEOUS at 22:35

## 2021-12-22 RX ADMIN — CHLORHEXIDINE GLUCONATE 15 MILLILITER(S): 213 SOLUTION TOPICAL at 17:16

## 2021-12-22 RX ADMIN — Medication 1000 MILLIGRAM(S): at 21:21

## 2021-12-22 RX ADMIN — HUMAN INSULIN 13 UNIT(S): 100 INJECTION, SUSPENSION SUBCUTANEOUS at 00:46

## 2021-12-22 RX ADMIN — Medication 1 DROP(S): at 00:49

## 2021-12-22 RX ADMIN — SODIUM CHLORIDE 3 MILLILITER(S): 9 INJECTION INTRAMUSCULAR; INTRAVENOUS; SUBCUTANEOUS at 05:37

## 2021-12-22 RX ADMIN — Medication 4 MILLILITER(S): at 11:47

## 2021-12-22 RX ADMIN — Medication 125 MILLIGRAM(S): at 17:36

## 2021-12-22 RX ADMIN — Medication 12.5 MILLIGRAM(S): at 05:36

## 2021-12-22 RX ADMIN — Medication 4 MILLILITER(S): at 17:15

## 2021-12-22 RX ADMIN — CHLORHEXIDINE GLUCONATE 15 MILLILITER(S): 213 SOLUTION TOPICAL at 05:43

## 2021-12-22 RX ADMIN — NYSTATIN CREAM 1 APPLICATION(S): 100000 CREAM TOPICAL at 05:43

## 2021-12-22 RX ADMIN — SODIUM CHLORIDE 3 MILLILITER(S): 9 INJECTION INTRAMUSCULAR; INTRAVENOUS; SUBCUTANEOUS at 17:46

## 2021-12-22 NOTE — PROGRESS NOTE ADULT - SUBJECTIVE AND OBJECTIVE BOX
CC: f/u for  diarrhea  Patient reports  nothing  REVIEW OF SYSTEMS:  All other review of systems negative (Comprehensive ROS)cannot get    Antimicrobials Day #  :1  vancomycin    Solution 125 milliGRAM(s) Oral every 6 hours    Other Medications Reviewed    T(F): 97.3 (12-22-21 @ 12:04), Max: 100 (12-21-21 @ 15:00)  HR: 93 (12-22-21 @ 12:04)  BP: 114/68 (12-22-21 @ 12:04)  RR: 18 (12-22-21 @ 12:04)  SpO2: 95% (12-22-21 @ 12:04)  Wt(kg): --    PHYSICAL EXAM:  General: on tc no acute distress  Eyes:  anicteric, no conjunctival injection, no discharge  Oropharynx: no lesions or injection 	  Neck: supple, without adenopathy  Lungs: course to auscultation  Heart: regular rate and rhythm; no murmur, rubs or gallops  Abdomen: soft, nondistended, nontender, without mass or organomegaly  Skin: no lesions  Extremities: no clubbing, cyanosis, or edema  Neurologic: unresponsive    LAB RESULTS:              MICROBIOLOGY:  RECENT CULTURES:      RADIOLOGY REVIEWED:    < from: CT Abdomen and Pelvis w/ IV Cont (12.16.21 @ 09:26) >    ACC: [id number] EXAM:  CT ABDOMEN AND PELVIS IC                        ACC: [id number] EXAM:  CT CHEST IC                          PROCEDURE DATE:  12/16/2021          INTERPRETATION:  CLINICAL INFORMATION: Fever of unknown origin.    COMPARISON: Pelvic CT 7/13/2017.    CONTRAST/COMPLICATIONS:  IV Contrast: Omnipaque 350  90 cc administered   10 cc discarded  Oral Contrast: NONE  Complications: None reported at time of study completion    PROCEDURE:  CT of the Chest, Abdomen and Pelvis was performed.  Sagittal and coronal reformats were performed.    FINDINGS:  CHEST:  LUNGS AND LARGE AIRWAYS: Status post tracheostomy. Bibasilar subsegmental   atelectasis.  PLEURA: No pleural effusion.  VESSELS: Coronary artery calcification.  HEART: Heart sizeis normal. No pericardial effusion.  MEDIASTINUM AND ANABELLA: No lymphadenopathy.  CHEST WALL AND LOWER NECK: Partially imaged lesion catheter coursing in   the right anterior subcutaneous tissues.    ABDOMEN AND PELVIS:  LIVER: Within normal limits.  BILE DUCTS: Normal caliber.  GALLBLADDER: Within normal limits.  SPLEEN: Within normal limits.  PANCREAS: Within normal limits.  ADRENALS: Within normal limits.  KIDNEYS/URETERS: Symmetric renal enhancement without hydronephrosis.   Bilateral renal cysts and hypodensities too small to characterize. A 1.7   cm exophytic left upper pole hypoattenuating lesion is indeterminate.    BLADDER: A 2.4 cm nodular soft tissue density along the right posterior   bladder wall appears separate from the prostate gland and not seen on   pelvic CT from 2017.  REPRODUCTIVE ORGANS: Enlarged prostate protruding into the base of   bladder.    BOWEL: PEG tube terminates in the stomach. Mild infiltration along the   PEG tube catheter without drainable fluid collection. No bowel   obstruction. Appendix is not visualized.  PERITONEUM: No ascites.  shunt catheter terminating in the left   hemipelvis.  VESSELS: Within normal limits.  RETROPERITONEUM/LYMPH NODES: No lymphadenopathy. Mild presacral edema.  ABDOMINAL WALL:  shunt catheter coursing in the right anterior   abdominal wall.  BONES: Degenerative changes. A 2.7 x 1.2 cm fluid collection adjacent to   the right ischial tuberosity.    IMPRESSION:  A 2.4 cm nodular soft tissue density in the bladder appears separate from   the prostate gland, concerning for bladder lesion. Correlate with   cystoscopy.    A 2.7 cm fluid collection adjacent to the right ischial tuberosity, may   be related to ischiogluteal bursitis.    Indeterminant 1.7 cm left renal lesion. Contrast-enhanced MRI can be   performed further evaluation.    Mild fatty infiltration along the PEG tube catheter without drainable   fluid collection.            --- End of Report ---    < end of copied text >            Assessment:  Patient admitted nov 4 for cerebellar bleed, had suboccipital crani and c spine lami for decompression then pica aneurysm surgery. REquired peg and trach. Had cdif earlier in course. He is now with diarrhea again. Not sure if cdif relapse or just from tube feeds.   Plan:  restart vanco po  will discuss with gi if a flex sig would help distinguish if he has a cdif relapse or has just reaction to tube feeds. If active cdif cannot give antimotility agents, if active cdif need to treat and since relapse, he may be better off getting dificid

## 2021-12-22 NOTE — PROGRESS NOTE ADULT - ASSESSMENT
61M with PMH of HTN, CAD s/p stent on DAPT, T2DM who complained of headache and subsequently became unresponsive found to have posterior fossa hemorrhage, IVH, hydrocephalus, s/p EVD and SOC on 11/4. 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 Course c/b respiratory failure and dysphagia s/p trach/PEG (11/19), VPS (11/23),Certas @4 completed course of cefepime for enterobacter and pseudomonas pneumonia on 11/21, fevers, c. diff colitis, urinary retention, b/l distal DVT. Trach changed 12/15 to #7 cuffless due to dislodgement. 12/16 CT A/P wo concern for infectious process. but concern for bladder lesion.     PLAN:  Neuro:   - shunt  Certas @4  - PICA aneurysm resection  - pt is DNR  Respiratory:   - continue duoneb and mucomyst  - trach care, suction prn  - PE/DVT- on apixaban  CV:  - HTN- continue metoprolol  - on doxazosin for urinary retention  Endocrine:   - DMT2- continue fingersticks with NPH 13u q6  DVT ppx:   - on eliquis for dvt  Renal:   - hypernatremia- Na 142, continue free water 200q6  - MICHELLE has resolved  -Urinary retention- requiring straight cath q6  - Urology following for renal lesion, PSA/Cytology pending, GOC necessary for any urologic intervention  - f/u urine cytology  ID:   - recurrent fever- ID recs appreciated, Afebrile since 12/14  - Cautiously follow off antibiotics  - follow up CT scan of C/A/P to look for occult source of infection, found to have 2.4 cm nodular soft tissue density, concerning for bladder lesion, 1.7 cm left renal lesion  - CTX for pulmonary coverage if he deteriorates per ID  - completed course of po vanco for c diff  GI:    - tolerating tube feeds via PEG  PT/OT:   - CHUYITA    Per Hospitalist note, it may be feasible at this time to monitor patient's progress for meaningful recovery over weeks, months and then decide whether there is benefit of pursuing workup of bladder lesion. Spouse in agreement.      Spectralink # [id number]   61M with PMH of HTN, CAD s/p stent on DAPT, T2DM who complained of headache and subsequently became unresponsive found to have posterior fossa hemorrhage, IVH, hydrocephalus, s/p EVD and SOC on 11/4. 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 Course c/b respiratory failure and dysphagia s/p trach/PEG (11/19), VPS (11/23),Certas @4 completed course of cefepime for enterobacter and pseudomonas pneumonia on 11/21, fevers, c. diff colitis, urinary retention, b/l distal DVT. Trach changed 12/15 to #7 cuffless due to dislodgement. 12/16 CT A/P wo concern for infectious process. but concern for bladder lesion.     PLAN:  Neuro:   - shunt  Certas @4  - PICA aneurysm resection  - pt is DNR  Respiratory:   - continue duoneb and mucomyst  - trach care, suction prn  - PE/DVT- on apixaban  CV:  - HTN- continue metoprolol  - on doxazosin for urinary retention  Endocrine:   - DMT2- continue fingersticks with NPH 13u q6  DVT ppx:   - on eliquis for dvt  Renal:   - hypernatremia- Na 142, continue free water 200q6  - MICHELLE has resolved  -Urinary retention- requiring straight cath q6  - Urology following for renal lesion, PSA/Cytology pending, GOC necessary for any urologic intervention  - f/u urine cytology is negative for high grade urothelial carcinoma  ID:   - recurrent fever- ID recs appreciated, Afebrile since 12/14  - Cautiously follow off antibiotics  - follow up CT scan of C/A/P to look for occult source of infection, found to have 2.4 cm nodular soft tissue density, concerning for bladder lesion, 1.7 cm left renal lesion  - CTX for pulmonary coverage if he deteriorates per ID  - completed course of po vanco for c diff  GI:    - tolerating tube feeds via PEG  PT/OT:   - CHUYITA    Per Hospitalist note, it may be feasible at this time to monitor patient's progress for meaningful recovery over weeks, months and then decide whether there is benefit of pursuing workup of bladder lesion. Spouse in agreement.      [facility] # [id number]

## 2021-12-22 NOTE — PROGRESS NOTE ADULT - SUBJECTIVE AND OBJECTIVE BOX
SUBJECTIVE: Pt seen and examined, resting comfortably in bed, awaiting rehab    OVERNIGHT EVENTS: none    Vital Signs Last 24 Hrs  T(C): 37.8 (22 Dec 2021 08:19), Max: 37.8 (21 Dec 2021 15:00)  T(F): 100 (22 Dec 2021 08:19), Max: 100 (21 Dec 2021 15:00)  HR: 85 (22 Dec 2021 08:19) (85 - 103)  BP: 109/68 (22 Dec 2021 08:19) (100/63 - 128/78)  BP(mean): --  RR: 18 (22 Dec 2021 08:19) (18 - 20)  SpO2: 97% (22 Dec 2021 08:19) (97% - 100%)    PHYSICAL EXAM:    General: No Acute Distress     Neurological: Awake, eyes open with downward gaze,  trach collar, intermittently FC (squeezes hand), UE 0/5, b/l LE wds    Pulmonary: Clear to Auscultation, No Rales, No Rhonchi, No Wheezes     Cardiovascular: S1, S2, Regular Rate and Rhythm     Gastrointestinal: Soft, Nontender, Nondistended     Incision: healed incision    LABS:             12-21 @ 07:01  -  12-22 @ 07:00  --------------------------------------------------------  IN: 1075 mL / OUT: 2100 mL / NET: -1025 mL      MEDICATIONS:  Antibiotics:    Neuro:  acetaminophen    Suspension .. 650 milliGRAM(s) Oral every 6 hours PRN Temp greater or equal to 38C (100.4F), Mild Pain (1 - 3)  acetaminophen   IVPB .. 1000 milliGRAM(s) IV Intermittent once    Cardiac:  doxazosin 2 milliGRAM(s) Oral daily  metoprolol tartrate 12.5 milliGRAM(s) Oral two times a day    Pulm:  acetylcysteine 20%  Inhalation 4 milliLiter(s) Inhalation every 6 hours  albuterol/ipratropium for Nebulization 3 milliLiter(s) Nebulizer every 6 hours  sodium chloride 3%  Inhalation 3 milliLiter(s) Inhalation every 6 hours    GI/:  pantoprazole   Suspension 40 milliGRAM(s) Oral daily    Other:   apixaban 5 milliGRAM(s) Oral two times a day  artificial  tears Solution 1 Drop(s) Both EYES every 6 hours  atorvastatin 40 milliGRAM(s) Oral at bedtime  chlorhexidine 0.12% Liquid 15 milliLiter(s) Oral Mucosa two times a day  dextrose 50% Injectable 25 Gram(s) IV Push once  dextrose 50% Injectable 12.5 Gram(s) IV Push once  insulin lispro (ADMELOG) corrective regimen sliding scale   SubCutaneous every 6 hours  insulin NPH human recombinant 13 Unit(s) SubCutaneous every 6 hours  multivitamin 1 Tablet(s) Oral daily  nystatin Powder 1 Application(s) Topical two times a day    DIET: [] Regular [] CCD [] Renal [] Puree [] Dysphagia [x] Tube Feeds:     IMAGING:    from: CT Head No Cont (12.12.21 @ 15:38) >    IMPRESSION:    Similar ventricular size when compared to 12/9/2021.    < end of copied text >  < from: [facility] Duplex Lower Ext Vein Scan, Bilat (12.08.21 @ 12:21) >    IMPRESSION:    Persistent bilateral below the knees deep venous thrombosis in the   bilateral calf veins. No propagation.    < end of copied text >  < from: CT Abdomen and Pelvis w/ IV Cont (12.16.21 @ 09:26) >    IMPRESSION:  A 2.4 cm nodular soft tissue density in the bladder appears separate from   the prostate gland, concerning for bladder lesion. Correlate with   cystoscopy.    A 2.7 cm fluid collection adjacent to the right ischial tuberosity, may   be related to ischiogluteal bursitis.    Indeterminant 1.7 cm left renal lesion. Contrast-enhanced MRI can be   performed further evaluation.    Mild fatty infiltration along the PEG tube catheter without drainable   fluid collection.    < end of copied text >

## 2021-12-22 NOTE — PROGRESS NOTE ADULT - SUBJECTIVE AND OBJECTIVE BOX
Chief Complaint:  Patient is a 61y old  Male who presents with a chief complaint of brain bleed (22 Dec 2021 13:10)      Date of service 12-22-21 @ 14:44      Interval Events:   Patient seen and examined. Patient had 4 episodes of loose stools last night. No melena, brbpr, abdominal distention.     Hospital Medications:  acetaminophen    Suspension .. 650 milliGRAM(s) Oral every 6 hours PRN  acetaminophen   IVPB .. 1000 milliGRAM(s) IV Intermittent once  acetylcysteine 20%  Inhalation 4 milliLiter(s) Inhalation every 6 hours  albuterol/ipratropium for Nebulization 3 milliLiter(s) Nebulizer every 6 hours  apixaban 5 milliGRAM(s) Oral two times a day  artificial  tears Solution 1 Drop(s) Both EYES every 6 hours  atorvastatin 40 milliGRAM(s) Oral at bedtime  chlorhexidine 0.12% Liquid 15 milliLiter(s) Oral Mucosa two times a day  dextrose 50% Injectable 25 Gram(s) IV Push once  dextrose 50% Injectable 12.5 Gram(s) IV Push once  doxazosin 2 milliGRAM(s) Oral daily  insulin lispro (ADMELOG) corrective regimen sliding scale   SubCutaneous every 6 hours  insulin NPH human recombinant 13 Unit(s) SubCutaneous every 6 hours  metoprolol tartrate 12.5 milliGRAM(s) Oral two times a day  multivitamin 1 Tablet(s) Oral daily  nystatin Powder 1 Application(s) Topical two times a day  pantoprazole   Suspension 40 milliGRAM(s) Oral daily  sodium chloride 3%  Inhalation 3 milliLiter(s) Inhalation every 6 hours  vancomycin    Solution 125 milliGRAM(s) Oral every 6 hours        Review of Systems:  unable to obtain    PHYSICAL EXAM:   Vital Signs:  Vital Signs Last 24 Hrs  T(C): 36.3 (22 Dec 2021 12:04), Max: 37.8 (21 Dec 2021 15:00)  T(F): 97.3 (22 Dec 2021 12:04), Max: 100 (21 Dec 2021 15:00)  HR: 93 (22 Dec 2021 12:04) (85 - 103)  BP: 114/68 (22 Dec 2021 12:04) (100/63 - 128/78)  BP(mean): --  RR: 18 (22 Dec 2021 12:04) (18 - 20)  SpO2: 95% (22 Dec 2021 12:04) (95% - 100%)  Daily     Daily       PHYSICAL EXAM:     GENERAL:  Appears stated age, well-groomed, well-nourished, no distress  HEENT:  NC/AT,  conjunctivae anicteric, clear and pink,   NECK: supple, trachea midline, +trach  CHEST:  Full & symmetric excursion, no increased effort, breath sounds clear  HEART:  Regular rhythm, no JVD  ABDOMEN:  Soft, non-tender, non-distended, normoactive bowel sounds,  no masses , no hepatosplenomegaly, +peg  EXTREMITIES:  no cyanosis,clubbing or edema  SKIN:  No rash, erythema, or, ecchymoses, no jaundice  NEURO:  non-focal, no asterixis  RECTAL: Deferred      LABS Personally reviewed by me:                        7.3    9.67  )-----------( 302      ( 22 Dec 2021 13:26 )             23.4     Mean Cell Volume: 93.6 fl (12-22-21 @ 13:26)                                        7.3    9.67  )-----------( 302      ( 22 Dec 2021 13:26 )             23.4       Imaging personally reviewed by me:

## 2021-12-22 NOTE — PROGRESS NOTE ADULT - ASSESSMENT
61 year old man with respiratory failure d/t ICH    1. ICH  -per neurosurgery,  -s/p  shunt    2. Respiratory failure  -for tracheostomy, will require long term enteral nutrition  -s/p PEG, continue use of abdominal binder  - aspiration precautions     3. Enterobacter PNA  -s/p course of antibiotics     4. Anemia, no overt GI bleed. EGD unremarkable.   -12/14: hgb dropped to 6.9 last night; received 1 unit of PRBCs- hgb this morning increased to 8.2  -12/15: Hgb 8.9 today  -H/H stable  -trend CBC  -continue PPI  -monitor for GI bleeding as patient is on apixaban  -DC planning underway to rehab    5. Cdiff infection   -s/p abx course  -restart vanco as per ID  -diarrhea resumed; recommend trial of metamucil and loperamide first   -no indication for flex sig at this time  -consider adding banatrol to feeds if diarrhea resumes     6. DVT on a/c  -apixaban 5mg resumed  -continue PPI     7. fever of 101.3F on 12/13  -BC and RVP negative/no growth  -ID following   -low grade fevers noted     8. possible bladder tumor  -care as per   -cytology pending       Attending supervision statement: I have personally seen and examined the patient. I fully participated in the care of this patient. I have made amendments to the documentation where necessary, and agree with the history, physical exam, and plan as outlined by the ACP.          [facility]  Gastroenterology and Hepatology  [address] [place], [place]  Office: [phone]  Cell: [phone]   61 year old man with respiratory failure d/t ICH    1. ICH  -per neurosurgery,  -s/p  shunt    2. Respiratory failure  -for tracheostomy, will require long term enteral nutrition  -s/p PEG, continue use of abdominal binder  - aspiration precautions     3. Enterobacter PNA  -s/p course of antibiotics     4. Anemia, no overt GI bleed. EGD unremarkable.   -12/14: hgb dropped to 6.9 last night; received 1 unit of PRBCs- hgb this morning increased to 8.2  -12/15: Hgb 8.9 today  -H/H stable  -trend CBC  -continue PPI  -monitor for GI bleeding as patient is on apixaban  -DC planning underway to rehab    5. Cdiff infection   -s/p abx course  -restart vanco as per ID  -diarrhea resumed; continue TF and recommend trial of metamucil and loperamide first   -no indication for flex sig at this time  -consider adding banatrol to feeds if diarrhea resumes     6. DVT on a/c  -apixaban 5mg resumed  -continue PPI     7. fever of 101.3F on 12/13  -BC and RVP negative/no growth  -ID following   -low grade fevers noted     8. possible bladder tumor  -care as per   -cytology pending       Attending supervision statement: I have personally seen and examined the patient. I fully participated in the care of this patient. I have made amendments to the documentation where necessary, and agree with the history, physical exam, and plan as outlined by the ACP.          [facility]  Gastroenterology and Hepatology  [address] [place], [place]  Office: [phone]  Cell: [phone]   61 year old man with respiratory failure d/t ICH    1. ICH  -per neurosurgery,  -s/p  shunt    2. Respiratory failure  -for tracheostomy, will require long term enteral nutrition  -s/p PEG, continue use of abdominal binder  - aspiration precautions     3. Enterobacter PNA  -s/p course of antibiotics     4. Anemia, no overt GI bleed. EGD unremarkable.   -12/14: hgb dropped to 6.9 last night; received 1 unit of PRBCs- hgb this morning increased to 8.2  -12/15: Hgb 8.9 today  -H/H stable  -trend CBC  -continue PPI  -monitor for GI bleeding as patient is on apixaban  -DC planning underway to rehab    5. Cdiff infection   -s/p abx course  -restart vanco as per ID  -diarrhea resumed; continue TF and recommend trial of metamucil and loperamide first   -no indication for flex sig at this time  -consider adding banatrol to feeds    6. DVT on a/c  -apixaban 5mg resumed  -continue PPI     7. fever of 101.3F on 12/13  -BC and RVP negative/no growth  -ID following   -low grade fevers noted     8. possible bladder tumor  -care as per   -cytology pending       Attending supervision statement: I have personally seen and examined the patient. I fully participated in the care of this patient. I have made amendments to the documentation where necessary, and agree with the history, physical exam, and plan as outlined by the ACP.          [facility]  Gastroenterology and Hepatology  [address] [place], [place]  Office: [phone]  Cell: [phone]

## 2021-12-23 DIAGNOSIS — D64.9 ANEMIA, UNSPECIFIED: ICD-10-CM

## 2021-12-23 LAB
ANION GAP SERPL CALC-SCNC: 15 MMOL/L — SIGNIFICANT CHANGE UP (ref 5–17)
BUN SERPL-MCNC: 29 MG/DL — HIGH (ref 7–23)
C DIFF GDH STL QL: SIGNIFICANT CHANGE UP
C DIFF GDH STL QL: SIGNIFICANT CHANGE UP
CALCIUM SERPL-MCNC: 9.2 MG/DL — SIGNIFICANT CHANGE UP (ref 8.4–10.5)
CHLORIDE SERPL-SCNC: 101 MMOL/L — SIGNIFICANT CHANGE UP (ref 96–108)
CO2 SERPL-SCNC: 27 MMOL/L — SIGNIFICANT CHANGE UP (ref 22–31)
CREAT SERPL-MCNC: 0.69 MG/DL — SIGNIFICANT CHANGE UP (ref 0.5–1.3)
CULTURE RESULTS: SIGNIFICANT CHANGE UP
GLUCOSE SERPL-MCNC: 102 MG/DL — HIGH (ref 70–99)
HCT VFR BLD CALC: 29.4 % — LOW (ref 39–50)
HGB BLD-MCNC: 9.6 G/DL — LOW (ref 13–17)
MCHC RBC-ENTMCNC: 30.7 PG — SIGNIFICANT CHANGE UP (ref 27–34)
MCHC RBC-ENTMCNC: 32.7 GM/DL — SIGNIFICANT CHANGE UP (ref 32–36)
MCV RBC AUTO: 93.9 FL — SIGNIFICANT CHANGE UP (ref 80–100)
NRBC # BLD: 0 /100 WBCS — SIGNIFICANT CHANGE UP (ref 0–0)
PLATELET # BLD AUTO: 425 K/UL — HIGH (ref 150–400)
POTASSIUM SERPL-MCNC: 3.8 MMOL/L — SIGNIFICANT CHANGE UP (ref 3.5–5.3)
POTASSIUM SERPL-SCNC: 3.8 MMOL/L — SIGNIFICANT CHANGE UP (ref 3.5–5.3)
RBC # BLD: 3.13 M/UL — LOW (ref 4.2–5.8)
RBC # FLD: 13.2 % — SIGNIFICANT CHANGE UP (ref 10.3–14.5)
SARS-COV-2 RNA SPEC QL NAA+PROBE: SIGNIFICANT CHANGE UP
SODIUM SERPL-SCNC: 143 MMOL/L — SIGNIFICANT CHANGE UP (ref 135–145)
SPECIMEN SOURCE: SIGNIFICANT CHANGE UP
WBC # BLD: 11 K/UL — HIGH (ref 3.8–10.5)
WBC # FLD AUTO: 11 K/UL — HIGH (ref 3.8–10.5)

## 2021-12-23 PROCEDURE — 70553 MRI BRAIN STEM W/O & W/DYE: CPT | Mod: 26

## 2021-12-23 PROCEDURE — 99233 SBSQ HOSP IP/OBS HIGH 50: CPT

## 2021-12-23 RX ORDER — PANTOPRAZOLE SODIUM 20 MG/1
40 TABLET, DELAYED RELEASE ORAL DAILY
Refills: 0 | Status: DISCONTINUED | OUTPATIENT
Start: 2021-12-23 | End: 2022-01-01

## 2021-12-23 RX ADMIN — Medication 1 TABLET(S): at 12:00

## 2021-12-23 RX ADMIN — NYSTATIN CREAM 1 APPLICATION(S): 100000 CREAM TOPICAL at 05:53

## 2021-12-23 RX ADMIN — Medication 1000 MILLIGRAM(S): at 09:00

## 2021-12-23 RX ADMIN — Medication 0: at 00:59

## 2021-12-23 RX ADMIN — HUMAN INSULIN 13 UNIT(S): 100 INJECTION, SUSPENSION SUBCUTANEOUS at 11:59

## 2021-12-23 RX ADMIN — HUMAN INSULIN 13 UNIT(S): 100 INJECTION, SUSPENSION SUBCUTANEOUS at 17:02

## 2021-12-23 RX ADMIN — APIXABAN 5 MILLIGRAM(S): 2.5 TABLET, FILM COATED ORAL at 17:05

## 2021-12-23 RX ADMIN — Medication 3 MILLILITER(S): at 05:52

## 2021-12-23 RX ADMIN — CHLORHEXIDINE GLUCONATE 15 MILLILITER(S): 213 SOLUTION TOPICAL at 05:53

## 2021-12-23 RX ADMIN — SODIUM CHLORIDE 3 MILLILITER(S): 9 INJECTION INTRAMUSCULAR; INTRAVENOUS; SUBCUTANEOUS at 05:50

## 2021-12-23 RX ADMIN — Medication 125 MILLIGRAM(S): at 00:59

## 2021-12-23 RX ADMIN — APIXABAN 5 MILLIGRAM(S): 2.5 TABLET, FILM COATED ORAL at 05:51

## 2021-12-23 RX ADMIN — SODIUM CHLORIDE 3 MILLILITER(S): 9 INJECTION INTRAMUSCULAR; INTRAVENOUS; SUBCUTANEOUS at 17:06

## 2021-12-23 RX ADMIN — Medication 4 MILLILITER(S): at 11:57

## 2021-12-23 RX ADMIN — Medication 4 MILLILITER(S): at 05:52

## 2021-12-23 RX ADMIN — CHLORHEXIDINE GLUCONATE 15 MILLILITER(S): 213 SOLUTION TOPICAL at 17:06

## 2021-12-23 RX ADMIN — Medication 1 DROP(S): at 17:05

## 2021-12-23 RX ADMIN — Medication 125 MILLIGRAM(S): at 11:58

## 2021-12-23 RX ADMIN — Medication 2: at 17:03

## 2021-12-23 RX ADMIN — Medication 3 MILLILITER(S): at 17:05

## 2021-12-23 RX ADMIN — Medication 125 MILLIGRAM(S): at 17:06

## 2021-12-23 RX ADMIN — HUMAN INSULIN 13 UNIT(S): 100 INJECTION, SUSPENSION SUBCUTANEOUS at 01:00

## 2021-12-23 RX ADMIN — Medication 12.5 MILLIGRAM(S): at 17:04

## 2021-12-23 RX ADMIN — PANTOPRAZOLE SODIUM 40 MILLIGRAM(S): 20 TABLET, DELAYED RELEASE ORAL at 12:12

## 2021-12-23 RX ADMIN — Medication 1 DROP(S): at 12:00

## 2021-12-23 RX ADMIN — Medication 125 MILLIGRAM(S): at 05:51

## 2021-12-23 RX ADMIN — Medication 12.5 MILLIGRAM(S): at 05:50

## 2021-12-23 RX ADMIN — NYSTATIN CREAM 1 APPLICATION(S): 100000 CREAM TOPICAL at 17:05

## 2021-12-23 RX ADMIN — Medication 2: at 11:59

## 2021-12-23 RX ADMIN — ATORVASTATIN CALCIUM 40 MILLIGRAM(S): 80 TABLET, FILM COATED ORAL at 21:40

## 2021-12-23 RX ADMIN — Medication 1 DROP(S): at 05:50

## 2021-12-23 RX ADMIN — Medication 4 MILLILITER(S): at 17:02

## 2021-12-23 RX ADMIN — Medication 3 MILLILITER(S): at 11:58

## 2021-12-23 RX ADMIN — Medication 400 MILLIGRAM(S): at 08:30

## 2021-12-23 RX ADMIN — SODIUM CHLORIDE 3 MILLILITER(S): 9 INJECTION INTRAMUSCULAR; INTRAVENOUS; SUBCUTANEOUS at 12:13

## 2021-12-23 RX ADMIN — Medication 2 MILLIGRAM(S): at 05:51

## 2021-12-23 NOTE — PROGRESS NOTE ADULT - PROBLEM SELECTOR PLAN 1
new recurrent fever on 12/22 with worsening diarrhea   - CXR with clear lungs  - UA with pyuria, leuk est, f/u urine cx (ordered)  - f/u blood cx - pending in lab  - ID following, recs appreciated  - restarted on empiric PO vanco on 12/22 for presumed recurrent c. diff infection  - ordered GI PCR, c. diff testing, and stool cx today - f/u results

## 2021-12-23 NOTE — PROGRESS NOTE ADULT - PROBLEM SELECTOR PLAN 8
s/p stent. was on DAPT per prescription refills   - resume ASA when clear from neurosurgery standpoint  - c/w statin (transaminitis resolved)  - patient was on metoprolol ER 25mg at home. continue metoprolol 12.5mg BID  - GI noting afib with RVR? but no other documentation in chart found supporting that and patient seems to be in sinus rhythm

## 2021-12-23 NOTE — PROGRESS NOTE ADULT - PROBLEM SELECTOR PLAN 6
found to be in urinary retention requiring intermittent cath  MICHELLE resolved  - monitor urine output, renal function  - c/w doxazosin

## 2021-12-23 NOTE — PROGRESS NOTE ADULT - ASSESSMENT
61 year old man with respiratory failure d/t ICH    1. ICH  -per neurosurgery,  -s/p  shunt    2. Respiratory failure  -for tracheostomy, will require long term enteral nutrition  -s/p PEG, continue use of abdominal binder  - aspiration precautions     3. Enterobacter PNA  -s/p course of antibiotics     4. Anemia, no overt GI bleed. EGD unremarkable.   -hgb stable    5. Cdiff infection , pt now w recurrent diarrhea, fever. Concern for cdiff relapse, vanco resumed, appreciate ID. Difficult to discern if this is cdiff relpas vs diarrhea from tube feeds. Will monitor response tno tube feeding    6. DVT on a/c  -apixaban 5mg resumed  -continue PPI       Attending supervision statement: I have personally seen and examined the patient. I fully participated in the care of this patient. I have made amendments to the documentation where necessary, and agree with the history, physical exam, and plan as outlined by the ACP.          [facility]  Gastroenterology and Hepatology  [address] [place], [place]  Office: [phone]  Cell: [phone]

## 2021-12-23 NOTE — PROGRESS NOTE ADULT - PROBLEM SELECTOR PLAN 3
overall stable. generally is in 7-8 range with intermittent need for transfusion.  - transfused 1u PRBC on 12/22 for Hb 6.8 with appropriate response  - monitor Hb on CBC daily  - transfuse for Hb<7

## 2021-12-23 NOTE — PROGRESS NOTE ADULT - SUBJECTIVE AND OBJECTIVE BOX
[facility] Division of Hospital Medicine  Gaby Gaitan MD  Spectra: [phone]      Patient is a 61y old  Male who presents with a chief complaint of IVH/SAH (23 Dec 2021 11:17)      SUBJECTIVE / OVERNIGHT EVENTS: febrile to 101.3 overnight. lethargic. unable to obtain ROS due to patients clinical condition.   ADDITIONAL REVIEW OF SYSTEMS:    MEDICATIONS  (STANDING):  acetylcysteine 20%  Inhalation 4 milliLiter(s) Inhalation every 6 hours  albuterol/ipratropium for Nebulization 3 milliLiter(s) Nebulizer every 6 hours  apixaban 5 milliGRAM(s) Oral two times a day  artificial  tears Solution 1 Drop(s) Both EYES every 6 hours  atorvastatin 40 milliGRAM(s) Oral at bedtime  chlorhexidine 0.12% Liquid 15 milliLiter(s) Oral Mucosa two times a day  dextrose 50% Injectable 25 Gram(s) IV Push once  dextrose 50% Injectable 12.5 Gram(s) IV Push once  doxazosin 2 milliGRAM(s) Oral daily  insulin lispro (ADMELOG) corrective regimen sliding scale   SubCutaneous every 6 hours  insulin NPH human recombinant 13 Unit(s) SubCutaneous every 6 hours  metoprolol tartrate 12.5 milliGRAM(s) Oral two times a day  multivitamin 1 Tablet(s) Oral daily  nystatin Powder 1 Application(s) Topical two times a day  pantoprazole  Injectable 40 milliGRAM(s) IV Push daily  sodium chloride 3%  Inhalation 3 milliLiter(s) Inhalation every 6 hours  vancomycin    Solution 125 milliGRAM(s) Oral every 6 hours    MEDICATIONS  (PRN):  acetaminophen    Suspension .. 650 milliGRAM(s) Oral every 6 hours PRN Temp greater or equal to 38C (100.4F), Mild Pain (1 - 3)      CAPILLARY BLOOD GLUCOSE      POCT Blood Glucose.: 186 mg/dL (23 Dec 2021 11:33)  POCT Blood Glucose.: 120 mg/dL (23 Dec 2021 06:10)  POCT Blood Glucose.: 102 mg/dL (23 Dec 2021 00:57)  POCT Blood Glucose.: 127 mg/dL (22 Dec 2021 17:38)    I&O's Summary    22 Dec 2021 07:01  -  23 Dec 2021 07:00  --------------------------------------------------------  IN: 2410 mL / OUT: 1402 mL / NET: 1008 mL        PHYSICAL EXAM:  Vital Signs Last 24 Hrs  T(C): 36.4 (23 Dec 2021 11:15), Max: 39.5 (22 Dec 2021 20:40)  T(F): 97.6 (23 Dec 2021 11:15), Max: 103.1 (22 Dec 2021 20:40)  HR: 91 (23 Dec 2021 11:15) (91 - 100)  BP: 106/68 (23 Dec 2021 11:15) (106/68 - 158/56)  BP(mean): --  RR: 18 (23 Dec 2021 11:15) (18 - 18)  SpO2: 96% (23 Dec 2021 11:15) (96% - 100%)    CONSTITUTIONAL: NAD, well-developed, well-groomed  EYES: PERRLA; conjunctiva and sclera clear  ENMT: Moist oral mucosa, no pharyngeal injection or exudates; normal dentition  NECK: Supple, +trach site c/d/i  RESPIRATORY: Normal respiratory effort; lungs are clear to auscultation bilaterally  CARDIOVASCULAR: Regular rate and rhythm, normal S1 and S2, no murmur/rub/gallop; No lower extremity edema; Peripheral pulses are 2+ bilaterally  ABDOMEN: Soft, Nondistended, Nontender to palpation, normoactive bowel sounds, PEG site c/d/i, +rectal tube with copious liquid brown output  MUSCULOSKELETAL:  No clubbing or cyanosis of digits; no joint swelling or tenderness to palpation  PSYCH: Alert, opens eyes to noxious stimuli, unable to assess orientation as nonverbal  NEUROLOGY: lethargic, not following commands for me today (he was last time i saw him)  SKIN: No rashes; no palpable lesions    LABS:                        9.6    11.00 )-----------( 425      ( 23 Dec 2021 06:18 )             29.4     12-    143  |  101  |  29<H>  ----------------------------<  102<H>  3.8   |  27  |  0.69    Ca    9.2      23 Dec 2021 06:18      Urinalysis Basic - ( 22 Dec 2021 23:29 )    Color: Yellow / Appearance: Clear / S.019 / pH: x  Gluc: x / Ketone: Negative  / Bili: Negative / Urobili: Negative   Blood: x / Protein: 30 mg/dL / Nitrite: Negative   Leuk Esterase: Moderate / RBC: 15 /hpf / WBC 8 /HPF   Sq Epi: x / Non Sq Epi: 2 /hpf / Bacteria: Negative      RADIOLOGY & ADDITIONAL TESTS:  Results Reviewed: new leukocytosis, Hb with more than appropriate response to PRBC, Cr stable  Imaging Personally Reviewed:  Electrocardiogram Personally Reviewed:    COORDINATION OF CARE:  Care Discussed with Consultants/Other Providers [Y]: ID attending Dr. Valencia, neurosurgery PA Lucia  Prior or Outpatient Records Reviewed [Y/N]:

## 2021-12-23 NOTE — PROGRESS NOTE ADULT - THIS PATIENT HAS THE FOLLOWING CONDITION(S)/DIAGNOSES ON THIS ADMISSION:
Encephalopathy acute post op blood loss anemia- s/p transfusion PRBCs, now greatly improved witll trend/Encephalopathy/Acute Blood Loss Anemia

## 2021-12-23 NOTE — PROGRESS NOTE ADULT - ASSESSMENT
61M with PMH of HTN, CAD s/p stent on DAPT, T2DM who complained of headache and subsequently became unresponsive found to have posterior fossa hemorrhage, IVH, hydrocephalus, s/p EVD and SOC on 11/4. 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 Course c/b respiratory failure and dysphagia s/p trach/PEG (11/19), VPS (11/23),Certas @4 completed course of cefepime for enterobacter and pseudomonas pneumonia on 11/21, fevers, c. diff colitis, urinary retention, b/l distal DVT. Trach changed 12/15 to #7 cuffless due to dislodgement. 12/16 CT A/P wo concern for infectious process. but concern for bladder lesion, wife will pursue possible treatment after rehab.      PLAN:  Neuro:   - shunt  Certas @4  - PICA aneurysm resection  - pt is DNR  Respiratory:   - continue duoneb and mucomyst  - trach care, suction prn  - PE/DVT- on apixaban  CV:  - HTN- continue metoprolol  - on doxazosin for urinary retention  Endocrine:   - DMT2- continue fingersticks with NPH 13u q6  DVT ppx:   - on eliquis for dvt  Renal:   - hypernatremia- Na 143, continue free water 200q6  - MICHELLE has resolved  -Urinary retention- requiring straight cath q6  - Urology following for renal lesion, PSA/Cytology pending, GOC necessary for any urologic intervention  - f/u urine cytology is negative for high grade urothelial carcinoma  ID:   - recurrent fever and diarrhea- cdiff testing, PO vanco restarted (completed course earlier in stay)- ID recs appreciated  -will obtain MRI w/wo contrast tonight tp r/o abcess  - follow up CT scan of C/A/P to look for occult source of infection, found to have 2.4 cm nodular soft tissue density, concerning for bladder lesion, 1.7 cm left renal lesion  - CTX for pulmonary coverage if he deteriorates per ID  GI:    - tolerating tube feeds via PEG  PT/OT:   - CHUYITA    Per Hospitalist note, it may be feasible at this time to monitor patient's progress for meaningful recovery over weeks, months and then decide whether there is benefit of pursuing workup of bladder lesion. Spouse in agreement.  spouse updated via phone and questions answered     will discuss with Dr Camilo Ambrose # [id number]   61M with PMH of HTN, CAD s/p stent on DAPT, T2DM who complained of headache and subsequently became unresponsive found to have posterior fossa hemorrhage, IVH, hydrocephalus, s/p EVD and SOC on 11/4. 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 Course c/b respiratory failure and dysphagia s/p trach/PEG (11/19), VPS (11/23),Certas @4 completed course of cefepime for enterobacter and pseudomonas pneumonia on 11/21, fevers, c. diff colitis, urinary retention, b/l distal DVT. Trach changed 12/15 to #7 cuffless due to dislodgement. 12/16 CT A/P wo concern for infectious process. but concern for bladder lesion, wife will pursue possible treatment after rehab.      PLAN:  Neuro:   - shunt  Certas @4  - PICA aneurysm resection  - pt is DNR  Respiratory:   - continue duoneb and mucomyst  - trach care, suction prn  - PE/DVT- on apixaban  CV:  - acute post op blood loss anemia improved after transfusion PTRBC 12/22  - HTN- continue metoprolol  - on doxazosin for urinary retention  Endocrine:   - DMT2- continue fingersticks with NPH 13u q6  DVT ppx:   - on eliquis for dvt  Renal:   - hypernatremia- Na 143, continue free water 200q6  - MICHELLE has resolved  -Urinary retention- requiring straight cath q6  - Urology following for renal lesion, PSA/Cytology pending, GOC necessary for any urologic intervention  - f/u urine cytology is negative for high grade urothelial carcinoma  ID:   - recurrent fever and diarrhea- cdiff testing, PO vanco restarted (completed course earlier in stay)- ID recs appreciated  -will obtain MRI w/wo contrast tonight tp r/o abcess  - follow up CT scan of C/A/P to look for occult source of infection, found to have 2.4 cm nodular soft tissue density, concerning for bladder lesion, 1.7 cm left renal lesion  - CTX for pulmonary coverage if he deteriorates per ID  GI:    - tolerating tube feeds via PEG  PT/OT:   - CHUYITA    Per Hospitalist note, it may be feasible at this time to monitor patient's progress for meaningful recovery over weeks, months and then decide whether there is benefit of pursuing workup of bladder lesion. Spouse in agreement.  spouse updated via phone and questions answered     will discuss with Dr Camilo Ambrose # [id number]

## 2021-12-23 NOTE — PROGRESS NOTE ADULT - SUBJECTIVE AND OBJECTIVE BOX
SUBJECTIVE: Pt seen and examined, febrile overnight, multiple loose BMs overnight now with rectal tube in place    Vital Signs Last 24 Hrs  T(C): 36.4 (23 Dec 2021 11:15), Max: 39.5 (22 Dec 2021 20:40)  T(F): 97.6 (23 Dec 2021 11:15), Max: 103.1 (22 Dec 2021 20:40)  HR: 91 (23 Dec 2021 11:15) (91 - 100)  BP: 106/68 (23 Dec 2021 11:15) (106/68 - 158/56)  BP(mean): --  RR: 18 (23 Dec 2021 11:15) (18 - 18)  SpO2: 96% (23 Dec 2021 11:15) (96% - 100%)    PHYSICAL EXAM:    General: No Acute Distress     Neurological: Awake, eyes open with downward gaze,  trach collar, intermittently FC (squeezes hand), UE 0/5, b/l LE wds    Pulmonary: Clear to Auscultation, No Rales, No Rhonchi, No Wheezes     Cardiovascular: S1, S2, Regular Rate and Rhythm     Gastrointestinal: Soft, Nontender, Nondistended     Incision: healed incision    LABS:                         9.6    11.00 )-----------( 425      ( 23 Dec 2021 06:18 )             29.4   12-23    143  |  101  |  29<H>  ----------------------------<  102<H>  3.8   |  27  |  0.69    Ca    9.2      23 Dec 2021 06:18    cdiff testing    MEDICATIONS  (STANDING):  acetylcysteine 20%  Inhalation 4 milliLiter(s) Inhalation every 6 hours  albuterol/ipratropium for Nebulization 3 milliLiter(s) Nebulizer every 6 hours  apixaban 5 milliGRAM(s) Oral two times a day  artificial  tears Solution 1 Drop(s) Both EYES every 6 hours  atorvastatin 40 milliGRAM(s) Oral at bedtime  chlorhexidine 0.12% Liquid 15 milliLiter(s) Oral Mucosa two times a day  dextrose 50% Injectable 25 Gram(s) IV Push once  dextrose 50% Injectable 12.5 Gram(s) IV Push once  doxazosin 2 milliGRAM(s) Oral daily  insulin lispro (ADMELOG) corrective regimen sliding scale   SubCutaneous every 6 hours  insulin NPH human recombinant 13 Unit(s) SubCutaneous every 6 hours  metoprolol tartrate 12.5 milliGRAM(s) Oral two times a day  multivitamin 1 Tablet(s) Oral daily  nystatin Powder 1 Application(s) Topical two times a day  pantoprazole  Injectable 40 milliGRAM(s) IV Push daily  sodium chloride 3%  Inhalation 3 milliLiter(s) Inhalation every 6 hours  vancomycin    Solution 125 milliGRAM(s) Oral every 6 hours    MEDICATIONS  (PRN):  acetaminophen    Suspension .. 650 milliGRAM(s) Oral every 6 hours PRN Temp greater or equal to 38C (100.4F), Mild Pain (1 - 3)    DIET: [] Regular [] CCD [] Renal [] Puree [] Dysphagia [x] Tube Feeds:     IMAGING:    from: CT Head No Cont (12.12.21 @ 15:38) >    IMPRESSION:    Similar ventricular size when compared to 12/9/2021.    < end of copied text >  < from: [facility] Duplex Lower Ext Vein Scan, Bilat (12.08.21 @ 12:21) >    IMPRESSION:    Persistent bilateral below the knees deep venous thrombosis in the   bilateral calf veins. No propagation.    < end of copied text >  < from: CT Abdomen and Pelvis w/ IV Cont (12.16.21 @ 09:26) >    IMPRESSION:  A 2.4 cm nodular soft tissue density in the bladder appears separate from   the prostate gland, concerning for bladder lesion. Correlate with   cystoscopy.    A 2.7 cm fluid collection adjacent to the right ischial tuberosity, may   be related to ischiogluteal bursitis.    Indeterminant 1.7 cm left renal lesion. Contrast-enhanced MRI can be   performed further evaluation.    Mild fatty infiltration along the PEG tube catheter without drainable   fluid collection.    < end of copied text >

## 2021-12-23 NOTE — PROGRESS NOTE ADULT - NSPROGADDITIONALINFOA_GEN_ALL_CORE
.  Gaby Gaitan MD  Division of Hospital Medicine  [facility]   Spectra: [phone]    Plan discussed with ID attending Dr. Valencia and neurosurgery QASIM Myers.

## 2021-12-23 NOTE — PROGRESS NOTE ADULT - ASSESSMENT
61M with PMH of HTN, CAD s/p stent on DAPT, T2DM who complained of headache and subsequently became unresponsive found to have posterior fossa hemorrhage, IVH, hydrocephalus, s/p EVD and SOC on 11/4. 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 Course c/b respiratory failure and dysphagia s/p trach/PEG (11/19), VPS (11/23), completed course of cefepime for enterobacter and pseudomonas pneumonia on 11/21, fevers, c. diff colitis, urinary retention, b/l distal DVT. New fevers again on 12/22 with diarrhea concerning for recurrent c. diff infection.

## 2021-12-23 NOTE — PROGRESS NOTE ADULT - PROBLEM SELECTOR PLAN 11
CT a/p with a 2.4 cm nodular soft tissue density in the bladder appears separate from the prostate gland, concerning for bladder lesion  previous hospitalist d/w urology - findings concerning for bladder tumor vs prostate gland  PSA normal, urine cytology negative for malignancy  previous hospitalist spoke to urology team regarding cysto transurethral resection which requires general anesthesia. Per , recommend to f/u urine cytology result to determine next steps - will need to f/u with urology once acute issues improve.     Previous hospitalist spoke to the patient's wife, Sandrita Bauer [phone]. Given the patient's underlying comorbid conditions coupled with his recent devastating neurologic events, other medical complications that ensued, and his poor neurologic and functional status at this time, it may be prudent to monitor his progress and see if he makes any meaningful recovery over the coming weeks to months to decide whether the benefit of pursuing any invasive work up for the bladder lesion outweighs the risk. Spouse seems to be in agreement at the moment.

## 2021-12-23 NOTE — PROGRESS NOTE ADULT - PROBLEM SELECTOR PLAN 2
completed course of PO vanco previously.  now with recurrent fever and diarrhea.  - rectal tube re-inserted  - infectious work-up as above  - restarted empirically on PO vanco on 12/22 by ID for presumed recurrent c. diff infection  - f/u GI PCR, c. diff testing, and stool cx which I ordered today

## 2021-12-23 NOTE — PROGRESS NOTE ADULT - SUBJECTIVE AND OBJECTIVE BOX
CC: f/u for  fever, diarrhea  Patient reports  nothing, not verbal  REVIEW OF SYSTEMS:  All other review of systems negative (Comprehensive ROS)    Antimicrobials Day #  :2  vancomycin    Solution 125 milliGRAM(s) Oral every 6 hours    Other Medications Reviewed    T(F): 97.6 (21 @ 11:15), Max: 103.1 (21 @ 20:40)  HR: 91 (21 @ 11:15)  BP: 106/68 (21 @ 11:15)  RR: 18 (21 @ 11:15)  SpO2: 96% (21 @ 11:15)  Wt(kg): --    PHYSICAL EXAM:  General: not interactive  no acute distress  Eyes:  anicteric, no conjunctival injection, no discharge  Oropharynx: no lesions or injection 	  Neck: supple, without adenopathy, trach  Lungs: clear to auscultation  Heart: regular rate and rhythm; no murmur, rubs or gallops  Abdomen: soft, nondistended, nontender, without mass or organomegaly, peg  Skin: no lesions  Extremities: no clubbing, cyanosis, or edema  Neurologic: follows some simple commands with squeeze and showing fingers  superficial ulcer on right buttock cheek, no induration  head wounds healed, neck too    LAB RESULTS:                        9.6    11.00 )-----------( 425      ( 23 Dec 2021 06:18 )             29.4         143  |  101  |  29<H>  ----------------------------<  102<H>  3.8   |  27  |  0.69    Ca    9.2      23 Dec 2021 06:18        Urinalysis Basic - ( 22 Dec 2021 23:29 )    Color: Yellow / Appearance: Clear / S.019 / pH: x  Gluc: x / Ketone: Negative  / Bili: Negative / Urobili: Negative   Blood: x / Protein: 30 mg/dL / Nitrite: Negative   Leuk Esterase: Moderate / RBC: 15 /hpf / WBC 8 /HPF   Sq Epi: x / Non Sq Epi: 2 /hpf / Bacteria: Negative      MICROBIOLOGY:  RECENT CULTURES:      RADIOLOGY REVIEWED:  < from: CT Abdomen and Pelvis w/ IV Cont (21 @ 09:26) >  ACC: [id number] EXAM:  CT ABDOMEN AND PELVIS IC                        ACC: [id number] EXAM:  CT CHEST IC                          PROCEDURE DATE:  2021          INTERPRETATION:  CLINICAL INFORMATION: Fever of unknown origin.    COMPARISON: Pelvic CT 2017.    CONTRAST/COMPLICATIONS:  IV Contrast: Omnipaque 350  90 cc administered   10 cc discarded  Oral Contrast: NONE  Complications: None reported at time of study completion    PROCEDURE:  CT of the Chest, Abdomen and Pelvis was performed.  Sagittal and coronal reformats were performed.    FINDINGS:  CHEST:  LUNGS AND LARGE AIRWAYS: Status post tracheostomy. Bibasilar subsegmental   atelectasis.  PLEURA: No pleural effusion.  VESSELS: Coronary artery calcification.  HEART: Heart sizeis normal. No pericardial effusion.  MEDIASTINUM AND ANABELLA: No lymphadenopathy.  CHEST WALL AND LOWER NECK: Partially imaged lesion catheter coursing in   the right anterior subcutaneous tissues.    ABDOMEN AND PELVIS:  LIVER: Within normal limits.  BILE DUCTS: Normal caliber.  GALLBLADDER: Within normal limits.  SPLEEN: Within normal limits.  PANCREAS: Within normal limits.  ADRENALS: Within normal limits.  KIDNEYS/URETERS: Symmetric renal enhancement without hydronephrosis.   Bilateral renal cysts and hypodensities too small to characterize. A 1.7   cm exophytic left upper pole hypoattenuating lesion is indeterminate.    BLADDER: A 2.4 cm nodular soft tissue density along the right posterior   bladder wall appears separate from the prostate gland and not seen on   pelvic CT from 2017.  REPRODUCTIVE ORGANS: Enlarged prostate protruding into the base of   bladder.    BOWEL: PEG tube terminates in the stomach. Mild infiltration along the   PEG tube catheter without drainable fluid collection. No bowel   obstruction. Appendix is not visualized.  PERITONEUM: No ascites.  shunt catheter terminating in the left   hemipelvis.  VESSELS: Within normal limits.  RETROPERITONEUM/LYMPH NODES: No lymphadenopathy. Mild presacral edema.  ABDOMINAL WALL:  shunt catheter coursing in the right anterior   abdominal wall.  BONES: Degenerative changes. A 2.7 x 1.2 cm fluid collection adjacent to   the right ischial tuberosity.    IMPRESSION:  A 2.4 cm nodular soft tissue density in the bladder appears separate from   the prostate gland, concerning for bladder lesion. Correlate with   cystoscopy.    A 2.7 cm fluid collection adjacent to the right ischial tuberosity, may   be related to ischiogluteal bursitis.    Indeterminant 1.7 cm left renal lesion. Contrast-enhanced MRI can be   performed further evaluation.    Mild fatty infiltration along the PEG tube catheter without drainable   fluid collection.      < end of copied text >      < from: Xray Chest 1 View- PORTABLE-Urgent (Xray Chest 1 View- PORTABLE-Urgent .) (21 @ 22:31) >    ACC: [id number] EXAM:  XR CHEST PORTABLE URGENT 1V                          PROCEDURE DATE:  2021          INTERPRETATION:  CLINICAL INDICATION: Fever    TECHNIQUE: Single frontal, portable view of the chest was obtained.    COMPARISON: Chest Radiograph dated 2021    FINDINGS:    Tracheostomy tube is seen.  Heart size is normal.  Clear lungs.  The visualized osseous structures demonstrate no acute pathology.   shunt catheter overlies RIGHT hemithorax.  IMPRESSION:  No acute radiographic cardiopulmonary pathology..    --- End of Report ---    < end of copied text >          Assessment:  Patient admitted last month with sah, cerebellar bleed, s/p soc for decompression, peg , trach, tx for pneumonia, 4 weeks ago treated for cdif and pneumonia again  now febrile again with many liquid bm. Suspect relapse of cdif. A recent ct a/p showed bladder lesion but no major pyuria to suggest uti, oxygenation good on tc and no excessive secretions, MS a bit better so cns infection seems low prob and wound is fine,  Plan:  continue po vanco, will monitor off systemic abx for now  f/u cultures  await planned mri of the head  await stool cdif  further intervention and w/u to be dictated by course.

## 2021-12-23 NOTE — PROGRESS NOTE ADULT - SUBJECTIVE AND OBJECTIVE BOX
Chief Complaint:  Patient is a 61y old  Male who presents with a chief complaint of brain bleed (23 Dec 2021 16:39)      Date of service 21 @ 18:13      Interval Events:   diarrhea, fever    Hospital Medications:  acetaminophen    Suspension .. 650 milliGRAM(s) Oral every 6 hours PRN  acetylcysteine 20%  Inhalation 4 milliLiter(s) Inhalation every 6 hours  albuterol/ipratropium for Nebulization 3 milliLiter(s) Nebulizer every 6 hours  apixaban 5 milliGRAM(s) Oral two times a day  artificial  tears Solution 1 Drop(s) Both EYES every 6 hours  atorvastatin 40 milliGRAM(s) Oral at bedtime  chlorhexidine 0.12% Liquid 15 milliLiter(s) Oral Mucosa two times a day  dextrose 50% Injectable 25 Gram(s) IV Push once  dextrose 50% Injectable 12.5 Gram(s) IV Push once  doxazosin 2 milliGRAM(s) Oral daily  insulin lispro (ADMELOG) corrective regimen sliding scale   SubCutaneous every 6 hours  insulin NPH human recombinant 13 Unit(s) SubCutaneous every 6 hours  metoprolol tartrate 12.5 milliGRAM(s) Oral two times a day  multivitamin 1 Tablet(s) Oral daily  nystatin Powder 1 Application(s) Topical two times a day  pantoprazole  Injectable 40 milliGRAM(s) IV Push daily  sodium chloride 3%  Inhalation 3 milliLiter(s) Inhalation every 6 hours  vancomycin    Solution 125 milliGRAM(s) Oral every 6 hours        Review of Systems:  General:  No wt loss, fevers, chills, night sweats, fatigue,   Eyes:  Good vision, no reported pain  ENT:  No sore throat, pain, runny nose, dysphagia  CV:  No pain, palpitations, hypo/hypertension  Resp:  No dyspnea, cough, tachypnea, wheezing  GI:  See HPI  :  No pain, bleeding, incontinence, nocturia  Muscle:  No pain, weakness  Neuro:  No weakness, tingling, memory problems  Psych:  No fatigue, insomnia, mood problems, depression  Endocrine:  No polyuria, polydipsia, cold/heat intolerance  Heme:  No petechiae, ecchymosis, easy bruisability  Integumentary:  No rash, edema    PHYSICAL EXAM:   Vital Signs Last 24 Hrs  T(C): 36.9 (23 Dec 2021 15:00), Max: 39.5 (22 Dec 2021 20:40)  T(F): 98.4 (23 Dec 2021 15:00), Max: 103.1 (22 Dec 2021 20:40)  HR: 90 (23 Dec 2021 15:00) (90 - 100)  BP: 112/73 (23 Dec 2021 15:00) (106/68 - 158/56)  BP(mean): --  RR: 18 (23 Dec 2021 15:00) (18 - 18)  SpO2: 100% (23 Dec 2021 15:00) (96% - 100%)      PHYSICAL EXAM:     GENERAL:  Appears stated age, well-groomed, well-nourished, no distress  HEENT:  NC/AT,  conjunctivae anicteric, clear and pink,   NECK: supple, trachea midline  CHEST:  Full & symmetric excursion, no increased effort, breath sounds clear  HEART:  Regular rhythm, no JVD  ABDOMEN:  Soft, non-tender, non-distended, normoactive bowel sounds,  no masses , no hepatosplenomegaly  EXTREMITIES:  no cyanosis,clubbing or edema  SKIN:  No rash, erythema, or, ecchymoses, no jaundice  NEURO:  Alert, non-focal, no asterixis  PSYCH: Appropriate affect, oriented to place and time  RECTAL: Deferred      LABS Personally reviewed by me:                        9.6    11.00 )-----------( 425      ( 23 Dec 2021 06:18 )             29.4     Mean Cell Volume: 93.9 fl (- @ 06:18)        143  |  101  |  29<H>  ----------------------------<  102<H>  3.8   |  27  |  0.69    Ca    9.2      23 Dec 2021 06:18          Urinalysis Basic - ( 22 Dec 2021 23:29 )    Color: Yellow / Appearance: Clear / S.019 / pH: x  Gluc: x / Ketone: Negative  / Bili: Negative / Urobili: Negative   Blood: x / Protein: 30 mg/dL / Nitrite: Negative   Leuk Esterase: Moderate / RBC: 15 /hpf / WBC 8 /HPF   Sq Epi: x / Non Sq Epi: 2 /hpf / Bacteria: Negative                              9.6    11.00 )-----------( 425      ( 23 Dec 2021 06:18 )             29.4                         6.8    10.46 )-----------( 399      ( 22 Dec 2021 20:51 )             22.0                         7.3    9.67  )-----------( 302      ( 22 Dec 2021 13:26 )             23.4       Imaging personally reviewed by me:

## 2021-12-23 NOTE — CONSULT NOTE ADULT - ASSESSMENT
Impression:    Sacral/bilateral Buttocks deep tissue injury  Incontinence of bowel  Incontinence Dermatitis  Possible superimposed fungal rash    Recommend:  1.) topical therapy: sacral/buttock injury - cleanse with incontinence cleanser, pat dry, apply nystatin and stomahesive powder, then seal in with cavilon skin barrier twice daily  Bilateral groins - cleanse with incontinence cleanser, pat dry, apply nystatin powder twice daily  2.) Incontinence Management - incontinence cleanser, pads, pericare BID, continue fecal management system  3.) Maintain on an alternating air with low air loss surface  4.) Turn and reposition Q 2 hours  5.) Nutrition optimization  6.) Offload heels/feet with complete cair air fluidized boots; ensure that the soles of the feet are not resting on the foot board of the bed.    Care as per medicine. Will follow.  Upon discharge [facility] [address] [place] [address] [phone]  Seen and discussed with clinical nurse  Thank you for this consult  Kathy Zamorano, LUIS-C, CWOCN [id number]

## 2021-12-23 NOTE — CONSULT NOTE ADULT - SUBJECTIVE AND OBJECTIVE BOX
Wound Surgery Consult Note:    HPI:  61M hx HTN, DM, cardiac stent on ASA. At work complaining of HA ~8:30, starting feeling dizzy and vomiting, HTN/keke when EMS got to him. SBP 220s, HR 50s.   On EMS arrival at 09:39AM 21, patient seen talking a little, garbling, moving all extremities, then quickly became unresponsive. No known blood thinners.  CTH shows large posterior fossa bleed w/ IVH/SAH/hydro.    Request for wound care consult for sacral/bilateral buttocks skin breakdown received from nursing. Mr. Camacho was encountered on an alternating air with low air loss surface. He is grossly incontinent of mushy/liquid stool which overflows the fecal management device that is in place. He was unable turn and reposition self. His extreme immobility, inactivity, gross incontinence of stool as well as poor nutritional status all contribute to his high risk for pressure injury development and hinder healing.     PAST MEDICAL & SURGICAL HISTORY:  HTN (hypertension)  Diabetes mellitus    MEDICATIONS  (STANDING):  acetylcysteine 20%  Inhalation 4 milliLiter(s) Inhalation every 6 hours  albuterol/ipratropium for Nebulization 3 milliLiter(s) Nebulizer every 6 hours  apixaban 5 milliGRAM(s) Oral two times a day  artificial  tears Solution 1 Drop(s) Both EYES every 6 hours  atorvastatin 40 milliGRAM(s) Oral at bedtime  chlorhexidine 0.12% Liquid 15 milliLiter(s) Oral Mucosa two times a day  dextrose 50% Injectable 25 Gram(s) IV Push once  dextrose 50% Injectable 12.5 Gram(s) IV Push once  doxazosin 2 milliGRAM(s) Oral daily  insulin lispro (ADMELOG) corrective regimen sliding scale   SubCutaneous every 6 hours  insulin NPH human recombinant 13 Unit(s) SubCutaneous every 6 hours  metoprolol tartrate 12.5 milliGRAM(s) Oral two times a day  multivitamin 1 Tablet(s) Oral daily  nystatin Powder 1 Application(s) Topical two times a day  pantoprazole  Injectable 40 milliGRAM(s) IV Push daily  sodium chloride 3%  Inhalation 3 milliLiter(s) Inhalation every 6 hours  vancomycin    Solution 125 milliGRAM(s) Oral every 6 hours    MEDICATIONS  (PRN):  acetaminophen    Suspension .. 650 milliGRAM(s) Oral every 6 hours PRN Temp greater or equal to 38C (100.4F), Mild Pain (1 - 3)    Allergies    penicillin (Other)    Intolerances    Vital Signs Last 24 Hrs  T(C): 36.4 (23 Dec 2021 11:15), Max: 39.5 (22 Dec 2021 20:40)  T(F): 97.6 (23 Dec 2021 11:15), Max: 103.1 (22 Dec 2021 20:40)  HR: 91 (23 Dec 2021 11:15) (91 - 100)  BP: 106/68 (23 Dec 2021 11:15) (106/68 - 158/56)  BP(mean): --  RR: 18 (23 Dec 2021 11:15) (18 - 18)  SpO2: 96% (23 Dec 2021 11:15) (95% - 100%)    Physical Exam:  General: Obtunded  Respiratory: no SOB on room air  Gastrointestinal: soft NT/ND (+)PEG   Neurology: nonverbal, not follow commands  Musculoskeletal: no contractures  Vascular: BLE edema equal  Skin:  Sacral/bilateral buttocks with superficially denuded erosion on right buttock with central deep maroon discoloration surrounded by pink wound tissue, small amount of serosanguinous drainage, right lower buttocks with superficially denuded skin with beefy red wound tissue, scant drainage, periwound skin is macerated and erythematous. Bilateral groins also macerated and erythematous  No odor, increased warmth, tenderness, induration, fluctuance    LABS:      143  |  101  |  29<H>  ----------------------------<  102<H>  3.8   |  27  |  0.69    Ca    9.2      23 Dec 2021 06:18                            9.6    11.00 )-----------( 425      ( 23 Dec 2021 06:18 )             29.4       Urinalysis Basic - ( 22 Dec 2021 23:29 )    Color: Yellow / Appearance: Clear / S.019 / pH: x  Gluc: x / Ketone: Negative  / Bili: Negative / Urobili: Negative   Blood: x / Protein: 30 mg/dL / Nitrite: Negative   Leuk Esterase: Moderate / RBC: 15 /hpf / WBC 8 /HPF   Sq Epi: x / Non Sq Epi: 2 /hpf / Bacteria: Negative

## 2021-12-24 LAB
ANION GAP SERPL CALC-SCNC: 11 MMOL/L — SIGNIFICANT CHANGE UP (ref 5–17)
BUN SERPL-MCNC: 29 MG/DL — HIGH (ref 7–23)
CALCIUM SERPL-MCNC: 8.2 MG/DL — LOW (ref 8.4–10.5)
CHLORIDE SERPL-SCNC: 107 MMOL/L — SIGNIFICANT CHANGE UP (ref 96–108)
CO2 SERPL-SCNC: 26 MMOL/L — SIGNIFICANT CHANGE UP (ref 22–31)
CREAT SERPL-MCNC: 0.67 MG/DL — SIGNIFICANT CHANGE UP (ref 0.5–1.3)
CULTURE RESULTS: NO GROWTH — SIGNIFICANT CHANGE UP
GLUCOSE SERPL-MCNC: 132 MG/DL — HIGH (ref 70–99)
HCT VFR BLD CALC: 29.8 % — LOW (ref 39–50)
HGB BLD-MCNC: 9.3 G/DL — LOW (ref 13–17)
MAGNESIUM SERPL-MCNC: 2.2 MG/DL — SIGNIFICANT CHANGE UP (ref 1.6–2.6)
MCHC RBC-ENTMCNC: 29.2 PG — SIGNIFICANT CHANGE UP (ref 27–34)
MCHC RBC-ENTMCNC: 31.2 GM/DL — LOW (ref 32–36)
MCV RBC AUTO: 93.7 FL — SIGNIFICANT CHANGE UP (ref 80–100)
NRBC # BLD: 0 /100 WBCS — SIGNIFICANT CHANGE UP (ref 0–0)
PLATELET # BLD AUTO: 355 K/UL — SIGNIFICANT CHANGE UP (ref 150–400)
POTASSIUM SERPL-MCNC: 4 MMOL/L — SIGNIFICANT CHANGE UP (ref 3.5–5.3)
POTASSIUM SERPL-SCNC: 4 MMOL/L — SIGNIFICANT CHANGE UP (ref 3.5–5.3)
RBC # BLD: 3.18 M/UL — LOW (ref 4.2–5.8)
RBC # FLD: 12.8 % — SIGNIFICANT CHANGE UP (ref 10.3–14.5)
SODIUM SERPL-SCNC: 144 MMOL/L — SIGNIFICANT CHANGE UP (ref 135–145)
SPECIMEN SOURCE: SIGNIFICANT CHANGE UP
WBC # BLD: 9.52 K/UL — SIGNIFICANT CHANGE UP (ref 3.8–10.5)
WBC # FLD AUTO: 9.52 K/UL — SIGNIFICANT CHANGE UP (ref 3.8–10.5)

## 2021-12-24 PROCEDURE — 99233 SBSQ HOSP IP/OBS HIGH 50: CPT

## 2021-12-24 RX ADMIN — Medication 125 MILLIGRAM(S): at 01:27

## 2021-12-24 RX ADMIN — CHLORHEXIDINE GLUCONATE 15 MILLILITER(S): 213 SOLUTION TOPICAL at 17:55

## 2021-12-24 RX ADMIN — CHLORHEXIDINE GLUCONATE 15 MILLILITER(S): 213 SOLUTION TOPICAL at 07:17

## 2021-12-24 RX ADMIN — Medication 1 DROP(S): at 17:47

## 2021-12-24 RX ADMIN — Medication 4 MILLILITER(S): at 17:53

## 2021-12-24 RX ADMIN — Medication 125 MILLIGRAM(S): at 12:15

## 2021-12-24 RX ADMIN — Medication 4 MILLILITER(S): at 12:17

## 2021-12-24 RX ADMIN — Medication 2 MILLIGRAM(S): at 07:18

## 2021-12-24 RX ADMIN — Medication 125 MILLIGRAM(S): at 23:46

## 2021-12-24 RX ADMIN — Medication 1 DROP(S): at 23:42

## 2021-12-24 RX ADMIN — Medication 125 MILLIGRAM(S): at 07:17

## 2021-12-24 RX ADMIN — Medication 1 DROP(S): at 07:14

## 2021-12-24 RX ADMIN — Medication 3 MILLILITER(S): at 23:38

## 2021-12-24 RX ADMIN — Medication 1 TABLET(S): at 12:21

## 2021-12-24 RX ADMIN — APIXABAN 5 MILLIGRAM(S): 2.5 TABLET, FILM COATED ORAL at 17:54

## 2021-12-24 RX ADMIN — SODIUM CHLORIDE 3 MILLILITER(S): 9 INJECTION INTRAMUSCULAR; INTRAVENOUS; SUBCUTANEOUS at 01:00

## 2021-12-24 RX ADMIN — NYSTATIN CREAM 1 APPLICATION(S): 100000 CREAM TOPICAL at 17:56

## 2021-12-24 RX ADMIN — Medication 1 DROP(S): at 12:15

## 2021-12-24 RX ADMIN — Medication 0: at 07:15

## 2021-12-24 RX ADMIN — PANTOPRAZOLE SODIUM 40 MILLIGRAM(S): 20 TABLET, DELAYED RELEASE ORAL at 12:22

## 2021-12-24 RX ADMIN — ATORVASTATIN CALCIUM 40 MILLIGRAM(S): 80 TABLET, FILM COATED ORAL at 21:28

## 2021-12-24 RX ADMIN — Medication 3 MILLILITER(S): at 12:16

## 2021-12-24 RX ADMIN — Medication 4 MILLILITER(S): at 01:25

## 2021-12-24 RX ADMIN — APIXABAN 5 MILLIGRAM(S): 2.5 TABLET, FILM COATED ORAL at 07:18

## 2021-12-24 RX ADMIN — SODIUM CHLORIDE 3 MILLILITER(S): 9 INJECTION INTRAMUSCULAR; INTRAVENOUS; SUBCUTANEOUS at 12:23

## 2021-12-24 RX ADMIN — Medication 125 MILLIGRAM(S): at 17:55

## 2021-12-24 RX ADMIN — HUMAN INSULIN 13 UNIT(S): 100 INJECTION, SUSPENSION SUBCUTANEOUS at 01:26

## 2021-12-24 RX ADMIN — Medication 12.5 MILLIGRAM(S): at 07:18

## 2021-12-24 RX ADMIN — Medication 1 DROP(S): at 01:23

## 2021-12-24 RX ADMIN — HUMAN INSULIN 13 UNIT(S): 100 INJECTION, SUSPENSION SUBCUTANEOUS at 18:19

## 2021-12-24 RX ADMIN — Medication 12.5 MILLIGRAM(S): at 17:54

## 2021-12-24 RX ADMIN — HUMAN INSULIN 13 UNIT(S): 100 INJECTION, SUSPENSION SUBCUTANEOUS at 12:21

## 2021-12-24 RX ADMIN — NYSTATIN CREAM 1 APPLICATION(S): 100000 CREAM TOPICAL at 07:17

## 2021-12-24 RX ADMIN — HUMAN INSULIN 13 UNIT(S): 100 INJECTION, SUSPENSION SUBCUTANEOUS at 07:16

## 2021-12-24 RX ADMIN — Medication 4 MILLILITER(S): at 23:39

## 2021-12-24 RX ADMIN — SODIUM CHLORIDE 3 MILLILITER(S): 9 INJECTION INTRAMUSCULAR; INTRAVENOUS; SUBCUTANEOUS at 23:38

## 2021-12-24 RX ADMIN — Medication 0: at 01:24

## 2021-12-24 RX ADMIN — Medication 4 MILLILITER(S): at 07:16

## 2021-12-24 RX ADMIN — Medication 3 MILLILITER(S): at 07:16

## 2021-12-24 RX ADMIN — SODIUM CHLORIDE 3 MILLILITER(S): 9 INJECTION INTRAMUSCULAR; INTRAVENOUS; SUBCUTANEOUS at 17:52

## 2021-12-24 RX ADMIN — HUMAN INSULIN 13 UNIT(S): 100 INJECTION, SUSPENSION SUBCUTANEOUS at 23:43

## 2021-12-24 RX ADMIN — Medication 3 MILLILITER(S): at 01:25

## 2021-12-24 RX ADMIN — Medication 3 MILLILITER(S): at 17:53

## 2021-12-24 NOTE — PROGRESS NOTE ADULT - SUBJECTIVE AND OBJECTIVE BOX
CC: f/u for  cdif relapse  Patient reports  nothing  REVIEW OF SYSTEMS:  All other review of systems negative (Comprehensive ROS)cannot get    Antimicrobials Day #  :3  vancomycin    Solution 125 milliGRAM(s) Oral every 6 hours    Other Medications Reviewed    T(F): 98.7 (21 @ 16:00), Max: 98.7 (21 @ 16:00)  HR: 99 (21 @ 16:00)  BP: 107/69 (21 @ 16:00)  RR: 20 (21 @ 16:00)  SpO2: 99% (21 @ 16:00)  Wt(kg): --    PHYSICAL EXAM:  General: not interactive, no acute distress  Eyes:  anicteric, no conjunctival injection, no discharge  Oropharynx: no lesions or injection 	  Neck: supple, without adenopathy, tc, white sputum  Lungs: course  to auscultation  Heart: regular rate and rhythm; no murmur, rubs or gallops  Abdomen: soft, nondistended, nontender, without mass or organomegaly, peg  Skin: no lesions  Extremities: no clubbing, cyanosis, or edema  Neurologic not interactive    LAB RESULTS:                        9.3    9.52  )-----------( 355      ( 24 Dec 2021 12:30 )             29.8         144  |  107  |  29<H>  ----------------------------<  132<H>  4.0   |  26  |  0.67    Ca    8.2<L>      24 Dec 2021 07:44  Mg     2.2             Urinalysis Basic - ( 22 Dec 2021 23:29 )    Color: Yellow / Appearance: Clear / S.019 / pH: x  Gluc: x / Ketone: Negative  / Bili: Negative / Urobili: Negative   Blood: x / Protein: 30 mg/dL / Nitrite: Negative   Leuk Esterase: Moderate / RBC: 15 /hpf / WBC 8 /HPF   Sq Epi: x / Non Sq Epi: 2 /hpf / Bacteria: Negative      MICROBIOLOGY:  RECENT CULTURES:   @ 17:21 Clean Catch Clean Catch (Midstream)     No growth       @ 15:00 .Stool Feces     GI PCR Results: NOT detected  *******Please Note:*******  GI panel PCR evaluates for:  Campylobacter, Plesiomonas shigelloides, Salmonella,  Vibrio, Yersinia enterocolitica, Enteroaggregative  Escherichia coli (EAEC), Enteropathogenic E.coli (EPEC),  Enterotoxigenic E. coli (ETEC) lt/st, Shiga-like  toxin-producing E. coli (STEC) stx1/stx2,  Shigella/ Enteroinvasive E. coli (EIEC), Cryptosporidium,  Cyclospora cayetanensis, Entamoeba histolytica,  Giardia lamblia, Adenovirus F 40/41, Astrovirus,  Norovirus GI/GII, Rotavirus A, Sapovirus       @ 00:28 .Blood Blood     No growth to date.          RADIOLOGY REVIEWED:  < from: MR Head w/wo IV Cont (21 @ 23:50) >    ACC: [id number] EXAM:  MR BRAIN WAW IC                          PROCEDURE DATE:  2021          INTERPRETATION:  Clinical indication: Fevers. Suspected abscess    MRI of the brain was performed using sagittal T1, axial T1 T2 T2 FLAIR   diffusionand susceptibility weighted sequence. The patient was injected   with approximately 8.5 cc of gadavist IV with 1.5 cc of contrast   discarded. Sagittal coronal and axial T1-weighted sequences were   performed.    This exam is compared with prior noncontrast head CT performed on   2021    This exam is somewhat limited by shunt reservoir artifact.    Postoperative changes compatible suboccipital craniectomy is again   identified. There is evidence of extra-axial collection identified in   this postoperative region with some adjacent areas of enhancement   involving the parenchymal region. This is seen in the midline and as well   as the right side. This extra axial collection measures approximately 3.0   x 7.5 x 6.2 cm and could becompatible with a pseudomeningocele, though   the possibility of underlying abscess or dural leak must be considered.   Clinical correlation and continued close interval follow-up is recommended    Small bilateral dural enhancement is identified right greater than left.   This is likely related to postop changes.    Extra-axial collections are seen involving the bilateral posterior fossa   region. The collection left side measures approximately 1.4 cm widest   diameter and the collection on the right side measures approximately 0.6   on in widest diameter.    There is abnormal T1 shortening identified in the postoperative bed just   posterior to the fourth ventricle. This is compatible areas of hemorrhage   and a postop material. There is thick irregular peripheral enhancement   identified around the periphery of postop bed which could be compatible   with postop changes the possibility of residual recurrent tumor cannot be   entirely excluded. Continued close interval follow-up is is recommended.    Right parietal shunt catheter is again seen with associated T2   prolongation along the shunt catheter tract.    The left lateral ventricle is slightly larger than the right. No evidence   of hydrocephalus is seen    Parenchymal volume loss is identified.    Abnormal T2 prolongation is identified in the periventricular white   matter region.    Small focus of abnormal T2 prolongation with restricted diffusion is seen   involving the right posterior centrum semiovale region. Small areaseen   involving the high medial right frontal cortex is well. These findings   could be compatible with acute lacunar infarcts.    Scattered areas of abnormal susceptibility involving the right posterior   frontal/parietal and inferior left parietalregion as well as the   postoperative and left cerebellar region. These findings are likely   compatible areas of old hemorrhage.    Minimal mucosal thickening is seen involving the right sphenoid sinus.    Inflammatory changes involving both mastoidand middle ear regions left   greater than right. This compatible underlying inflammatory change.    IMPRESSION: Postop changes are again identified with extra axial   collection seen in the postoperative region. This could be compatible   with pseudomeningocele, though the possibility of abscess or dural leak   cannot entirely excluded. Clinical correlation and continued close   interval is recommended.    Small foci of abnormal restricted diffusion is seen involving the right   centrum semiovale and medial right frontal cortex. These findings are   likely compatible with acute lacunar infarcts.    --- End of Report ---          < end of copied text >              < from: Xray Chest 1 View- PORTABLE-Urgent (Xray Chest 1 View- PORTABLE-Urgent .) (12..21 @ 22:31) >  ACC: [id number] EXAM:  XR CHEST PORTABLE URGENT 1V                          PROCEDURE DATE:  2021          INTERPRETATION:  CLINICAL INDICATION: Fever    TECHNIQUE: Single frontal, portable view of the chest was obtained.    COMPARISON: Chest Radiograph dated 2021    FINDINGS:    Tracheostomy tube is seen.  Heart size is normal.  Clear lungs.  The visualized osseous structures demonstrate no acute pathology.   shunt catheter overlies RIGHT hemithorax.  IMPRESSION:  No acute radiographic cardiopulmonary pathology..    --- End of Report ---          < end of copied text >  Assessment:  62 yo male with PMH of HTN   Admitted on  with severe headache and found to have cerebellar bleed.  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  S/p trach and peg.   He has distal DVT's - on apixaban.  He received cefepime - for respiratory coverage.  Developed watery C diff diarrhea on  - started on oral vancomycin   Then restarted on cefepime on  for concerns of pneumonia since had low grade temps. Giorgio neb added later  But CXR's remained clear.  Ct of brain showed residual blood.  Pseudomonas in sputum found to be resistant to carbapenems & quinolones. Sputum isolated strep pneumoniae as well     urine and blood cultures are negative.  Cefepime completed on  & Giorgio nebs on 21  Failed TOV & teixeira was replaced for retention of 1000 ml of urine on 21 PM  Spiked a fever to 102F ? in response to retention  UA with 3 WBCs, no nit or LE, large blood   CXR with clear lungs  CTH revealed a Pseudomeningocele, s/p tap with gram stain without organism  CSF finalized as no growth  Blood & urine cx also finalized as no growth  Completed treatment for C diff w 2 wks of oral vanco on 12/10/21  Diarrhea resolved  Now with fever x 2-3 days, no clinical evidence of C Diff relapse, UA bland, and CXR is clear.  Repeat blood cultures and urine culture negative, sputum with MURF.  Source of fevers not clear   ? Small aspirations, ? occult hematoma  CT results reviewed no mention of an infectious process   blood cx no growth   urine cx no growth  respiratory cx reports normal bindu   Over past couple of days large volume of diarrhea and fever. Cdif is positive so po vanco commenced. cultures are negative so far. MRI head shos a pseudomeningocoele, and as mentioned prior tap neg for infection . Suspect cdif relapse as source of fever    Suggest:  continue to monitor offsystemic antibiotics   Continue po vancomycin  f/u cultures  if fevers persist and cultures neg will need to tap pseudomeningocoele/LP give shunt

## 2021-12-24 NOTE — PROGRESS NOTE ADULT - SUBJECTIVE AND OBJECTIVE BOX
[facility] Division of Hospital Medicine  Lucia CalvilloDO  Pager (M-F, [id number]-[phone]): [phone]  Other Times:  [phone]    Patient is a 61y old  Male who presents with a chief complaint of brain bleed (23 Dec 2021 16:39)      SUBJECTIVE / OVERNIGHT EVENTS: Tm over last 24h 101.3F. 3 BMs over last 24h.   ADDITIONAL REVIEW OF SYSTEMS: negative    MEDICATIONS  (STANDING):  acetylcysteine 20%  Inhalation 4 milliLiter(s) Inhalation every 6 hours  albuterol/ipratropium for Nebulization 3 milliLiter(s) Nebulizer every 6 hours  apixaban 5 milliGRAM(s) Oral two times a day  artificial  tears Solution 1 Drop(s) Both EYES every 6 hours  atorvastatin 40 milliGRAM(s) Oral at bedtime  chlorhexidine 0.12% Liquid 15 milliLiter(s) Oral Mucosa two times a day  dextrose 50% Injectable 25 Gram(s) IV Push once  dextrose 50% Injectable 12.5 Gram(s) IV Push once  doxazosin 2 milliGRAM(s) Oral daily  insulin lispro (ADMELOG) corrective regimen sliding scale   SubCutaneous every 6 hours  insulin NPH human recombinant 13 Unit(s) SubCutaneous every 6 hours  metoprolol tartrate 12.5 milliGRAM(s) Oral two times a day  multivitamin 1 Tablet(s) Oral daily  nystatin Powder 1 Application(s) Topical two times a day  pantoprazole  Injectable 40 milliGRAM(s) IV Push daily  sodium chloride 3%  Inhalation 3 milliLiter(s) Inhalation every 6 hours  vancomycin    Solution 125 milliGRAM(s) Oral every 6 hours    MEDICATIONS  (PRN):  acetaminophen    Suspension .. 650 milliGRAM(s) Oral every 6 hours PRN Temp greater or equal to 38C (100.4F), Mild Pain (1 - 3)      CAPILLARY BLOOD GLUCOSE      POCT Blood Glucose.: 94 mg/dL (24 Dec 2021 11:54)  POCT Blood Glucose.: 121 mg/dL (24 Dec 2021 07:07)  POCT Blood Glucose.: 110 mg/dL (24 Dec 2021 01:16)  POCT Blood Glucose.: 151 mg/dL (23 Dec 2021 16:42)    I&O's Summary    23 Dec 2021 07:01  -  24 Dec 2021 07:00  --------------------------------------------------------  IN: 2550 mL / OUT: 0 mL / NET: 2550 mL        PHYSICAL EXAM:  Vital Signs Last 24 Hrs  T(C): 36.6 (24 Dec 2021 12:37), Max: 37 (23 Dec 2021 19:00)  T(F): 97.9 (24 Dec 2021 12:37), Max: 98.6 (23 Dec 2021 19:00)  HR: 97 (24 Dec 2021 12:37) (90 - 98)  BP: 120/75 (24 Dec 2021 12:37) (106/65 - 128/77)  BP(mean): --  RR: 20 (24 Dec 2021 12:37) (18 - 20)  SpO2: 97% (24 Dec 2021 12:37) (97% - 100%)    CONSTITUTIONAL: NAD, well-developed, well-groomed  EYES: PERRLA; conjunctiva and sclera clear  ENMT: Moist oral mucosa, no pharyngeal injection or exudates; normal dentition  NECK: Supple, +trach site c/d/i  RESPIRATORY: Normal respiratory effort; lungs are clear to auscultation bilaterally  CARDIOVASCULAR: Regular rate and rhythm, normal S1 and S2, no murmur/rub/gallop; No lower extremity edema; Peripheral pulses are 2+ bilaterally  ABDOMEN: Soft, Nondistended, Nontender to palpation, normoactive bowel sounds, PEG site c/d/i, +rectal tube  MUSCULOSKELETAL:  No clubbing or cyanosis of digits; no joint swelling or tenderness to palpation  PSYCH: Alert, opens eyes to noxious stimuli, unable to assess orientation as nonverbal  NEUROLOGY: lethargic, not following commands, blinks his eyes but doesn't look up  SKIN: No rashes; no palpable lesions    LABS:                        9.3    9.52  )-----------( 355      ( 24 Dec 2021 12:30 )             29.8     12-    144  |  107  |  29<H>  ----------------------------<  132<H>  4.0   |  26  |  0.67    Ca    8.2<L>      24 Dec 2021 07:44  Mg     2.2     12-24            Urinalysis Basic - ( 22 Dec 2021 23:29 )    Color: Yellow / Appearance: Clear / S.019 / pH: x  Gluc: x / Ketone: Negative  / Bili: Negative / Urobili: Negative   Blood: x / Protein: 30 mg/dL / Nitrite: Negative   Leuk Esterase: Moderate / RBC: 15 /hpf / WBC 8 /HPF   Sq Epi: x / Non Sq Epi: 2 /hpf / Bacteria: Negative        Culture - Urine (collected 23 Dec 2021 17:21)  Source: Clean Catch Clean Catch (Midstream)  Final Report (24 Dec 2021 11:59):    No growth    GI PCR Panel, Stool (collected 23 Dec 2021 15:00)  Source: .Stool Feces  Final Report (23 Dec 2021 18:49):    GI PCR Results: NOT detected    *******Please Note:*******    GI panel PCR evaluates for:    Campylobacter, Plesiomonas shigelloides, Salmonella,    Vibrio, Yersinia enterocolitica, Enteroaggregative    Escherichia coli (EAEC), Enteropathogenic E.coli (EPEC),    Enterotoxigenic E. coli (ETEC) lt/st, Shiga-like    toxin-producing E. coli (STEC) stx1/stx2,    Shigella/ Enteroinvasive E. coli (EIEC), Cryptosporidium,    Cyclospora cayetanensis, Entamoeba histolytica,    Giardia lamblia, Adenovirus F 40/41, Astrovirus,    Norovirus GI/GII, Rotavirus A, Sapovirus    Culture - Blood (collected 23 Dec 2021 00:28)  Source: .Blood Blood  Preliminary Report (24 Dec 2021 01:02):    No growth to date.    Culture - Blood (collected 23 Dec 2021 00:28)  Source: .Blood Blood  Preliminary Report (24 Dec 2021 01:02):    No growth to date.

## 2021-12-24 NOTE — PROGRESS NOTE ADULT - SUBJECTIVE AND OBJECTIVE BOX
Chief Complaint:  Patient is a 61y old  Male who presents with a chief complaint of brain bleed (23 Dec 2021 16:39)        Interval Events:   afebrile    Hospital Medications:  acetaminophen    Suspension .. 650 milliGRAM(s) Oral every 6 hours PRN  acetylcysteine 20%  Inhalation 4 milliLiter(s) Inhalation every 6 hours  albuterol/ipratropium for Nebulization 3 milliLiter(s) Nebulizer every 6 hours  apixaban 5 milliGRAM(s) Oral two times a day  artificial  tears Solution 1 Drop(s) Both EYES every 6 hours  atorvastatin 40 milliGRAM(s) Oral at bedtime  chlorhexidine 0.12% Liquid 15 milliLiter(s) Oral Mucosa two times a day  dextrose 50% Injectable 25 Gram(s) IV Push once  dextrose 50% Injectable 12.5 Gram(s) IV Push once  doxazosin 2 milliGRAM(s) Oral daily  insulin lispro (ADMELOG) corrective regimen sliding scale   SubCutaneous every 6 hours  insulin NPH human recombinant 13 Unit(s) SubCutaneous every 6 hours  metoprolol tartrate 12.5 milliGRAM(s) Oral two times a day  multivitamin 1 Tablet(s) Oral daily  nystatin Powder 1 Application(s) Topical two times a day  pantoprazole  Injectable 40 milliGRAM(s) IV Push daily  sodium chloride 3%  Inhalation 3 milliLiter(s) Inhalation every 6 hours  vancomycin    Solution 125 milliGRAM(s) Oral every 6 hours            PHYSICAL EXAM:   Vital Signs Last 24 Hrs  T(C): 36.9 (23 Dec 2021 15:00), Max: 39.5 (22 Dec 2021 20:40)  T(F): 98.4 (23 Dec 2021 15:00), Max: 103.1 (22 Dec 2021 20:40)  HR: 90 (23 Dec 2021 15:00) (90 - 100)  BP: 112/73 (23 Dec 2021 15:00) (106/68 - 158/56)  BP(mean): --  RR: 18 (23 Dec 2021 15:00) (18 - 18)  SpO2: 100% (23 Dec 2021 15:00) (96% - 100%)      PHYSICAL EXAM:     GENERAL:  Appears stated age, well-groomed, well-nourished, no distress  HEENT:  NC/AT,  conjunctivae anicteric, clear and pink,   NECK: supple, trachea midline  CHEST:  Full & symmetric excursion, no increased effort, breath sounds clear  HEART:  Regular rhythm, no JVD  ABDOMEN:  Soft, non-tender, non-distended, normoactive bowel sounds,  no masses , no hepatosplenomegaly  EXTREMITIES:  no cyanosis,clubbing or edema  SKIN:  No rash, erythema, or, ecchymoses, no jaundice  NEURO:  Alert, non-focal, no asterixis  PSYCH: Appropriate affect, oriented to place and time  RECTAL: Deferred      LABS Personally reviewed by me:                        9.6    11.00 )-----------( 425      ( 23 Dec 2021 06:18 )             29.4     Mean Cell Volume: 93.9 fl (- @ 06:18)        143  |  101  |  29<H>  ----------------------------<  102<H>  3.8   |  27  |  0.69    Ca    9.2      23 Dec 2021 06:18          Urinalysis Basic - ( 22 Dec 2021 23:29 )    Color: Yellow / Appearance: Clear / S.019 / pH: x  Gluc: x / Ketone: Negative  / Bili: Negative / Urobili: Negative   Blood: x / Protein: 30 mg/dL / Nitrite: Negative   Leuk Esterase: Moderate / RBC: 15 /hpf / WBC 8 /HPF   Sq Epi: x / Non Sq Epi: 2 /hpf / Bacteria: Negative                              9.6    11.00 )-----------( 425      ( 23 Dec 2021 06:18 )             29.4                         6.8    10.46 )-----------( 399      ( 22 Dec 2021 20:51 )             22.0                         7.3    9.67  )-----------( 302      ( 22 Dec 2021 13:26 )             23.4       Imaging personally reviewed by me:

## 2021-12-24 NOTE — PROGRESS NOTE ADULT - PROBLEM SELECTOR PLAN 2
completed course of PO vanco previously.  now with recurrent fever and diarrhea.  - rectal tube re-inserted  - infectious work-up as above  - restarted empirically on PO vanco on 12/22 by ID for presumed recurrent c. diff infection  - GI PCR neg, C diff still positive

## 2021-12-24 NOTE — PROGRESS NOTE ADULT - SUBJECTIVE AND OBJECTIVE BOX
SUBJECTIVE: Pt seen and examined. Pt denies any complaints.     Vital Signs Last 24 Hrs  T(C): 36.7 (24 Dec 2021 08:17), Max: 37 (23 Dec 2021 19:00)  T(F): 98.1 (24 Dec 2021 08:17), Max: 98.6 (23 Dec 2021 19:00)  HR: 91 (24 Dec 2021 08:17) (90 - 98)  BP: 128/77 (24 Dec 2021 08:17) (106/65 - 128/77)  RR: 20 (24 Dec 2021 08:17) (18 - 20)  SpO2: 99% (24 Dec 2021 08:17) (98% - 100%)                        9.6    11.00 )-----------( 425      ( 23 Dec 2021 06:18 )             29.4    12-24    144  |  107  |  29<H>  ----------------------------<  132<H>  4.0   |  26  |  0.67    Ca    8.2<L>      24 Dec 2021 07:44  Mg     2.2     12-24    NEUROIMAGING: < from: MR Head w/wo IV Cont (12.23.21 @ 23:50) >  IMPRESSION: Postop changes are again identified with extra axial   collection seen in the postoperative region. This could be compatible   with pseudomeningocele, though the possibility of abscess or dural leak   cannot entirely excluded. Clinical correlation and continued close   interval is recommended.    Small foci of abnormal restricted diffusion is seen involving the right   centrum semiovale and medial right frontal cortex. These findings are   likely compatible with acute lacunar infarcts.    < end of copied text >      PHYSICAL EXAM:    General: No Acute Distress     Neurological: On trach collar, not following commands, moves UE spontaneously and withdraws LE     Pulmonary: Clear to Auscultation, No Rales, No Rhonchi, No Wheezes     Cardiovascular: S1, S2, Regular Rate and Rhythm     Gastrointestinal: Soft, Nontender, Nondistended     MEDICATIONS:   Antibiotics:  vancomycin    Solution 125 milliGRAM(s) Oral every 6 hours    Neuro:  acetaminophen    Suspension .. 650 milliGRAM(s) Oral every 6 hours PRN Temp greater or equal to 38C (100.4F), Mild Pain (1 - 3)    Anticoagulation:  apixaban 5 milliGRAM(s) Oral two times a day    Cardiology:  doxazosin 2 milliGRAM(s) Oral daily  metoprolol tartrate 12.5 milliGRAM(s) Oral two times a day  atorvastatin 40 milliGRAM(s) Oral at bedtime    Endo:   Insulin lispro (ADMELOG) corrective regimen sliding scale   SubCutaneous every 6 hours  insulin NPH human recombinant 13 Unit(s) SubCutaneous every 6 hours    Pulm:  acetylcysteine 20%  Inhalation 4 milliLiter(s) Inhalation every 6 hours  albuterol/ipratropium for Nebulization 3 milliLiter(s) Nebulizer every 6 hours  sodium chloride 3%  Inhalation 3 milliLiter(s) Inhalation every 6 hours    GI/:  pantoprazole  Injectable 40 milliGRAM(s) IV Push daily    Other:  artificial  tears Solution 1 Drop(s) Both EYES every 6 hours  chlorhexidine 0.12% Liquid 15 milliLiter(s) Oral Mucosa two times a day  multivitamin 1 Tablet(s) Oral daily  nystatin Powder 1 Application(s) Topical two times a day

## 2021-12-24 NOTE — PROGRESS NOTE ADULT - PROBLEM SELECTOR PLAN 1
new recurrent fever on 12/22, 12/23 with worsening diarrhea   - CXR with clear lungs  - UA with pyuria, leuk est, f/u urine cx - ngtd  - f/u blood cx - ngtd  - ID following, recs appreciated  - restarted on empiric PO vanco on 12/22 for presumed recurrent c. diff infection

## 2021-12-24 NOTE — PROGRESS NOTE ADULT - ASSESSMENT
61 year old man with respiratory failure d/t ICH    1. ICH  -per neurosurgery,  -s/p  shunt  -for MRI to r/o meningitis    2. Respiratory failure  -for tracheostomy, will require long term enteral nutrition  -s/p PEG, continue use of abdominal binder  - aspiration precautions     3. Enterobacter PNA  -s/p course of antibiotics     4. Anemia, no overt GI bleed. EGD unremarkable.   -hgb stable    5. Cdiff infection , pt now w recurrent diarrhea, fever. Concern for cdiff relapse, vanco resumed, appreciate ID. Difficult to discern if this is cdiff relpase vs diarrhea from tube feeds. Will monitor response to abx. Please document output from rectal tube    6. DVT on a/c  -apixaban 5mg resumed  -continue PPI       Attending supervision statement: I have personally seen and examined the patient. I fully participated in the care of this patient. I have made amendments to the documentation where necessary, and agree with the history, physical exam, and plan as outlined by the ACP.          [facility]  Gastroenterology and Hepatology  [address] [place], [place]  Office: [phone]  Cell: [phone]

## 2021-12-24 NOTE — PROGRESS NOTE ADULT - PROBLEM SELECTOR PLAN 11
CT a/p with a 2.4 cm nodular soft tissue density in the bladder appears separate from the prostate gland, concerning for bladder lesion  previous hospitalist d/w urology - findings concerning for bladder tumor vs prostate gland  PSA normal, urine cytology negative for malignancy  previous hospitalist spoke to urology team regarding cysto transurethral resection which requires general anesthesia. Per , recommend to f/u urine cytology result to determine next steps - will need to f/u with urology once acute issues improve.     Previous hospitalist spoke to the patient's wife, Sandrita Bauer [phone]. Given the patient's underlying comorbid conditions coupled with his recent devastating neurologic events, other medical complications that ensued, and his poor neurologic and functional status at this time, it may be prudent to monitor his progress and see if he makes any meaningful recovery over the coming weeks to months to decide whether the benefit of pursuing any invasive work up for the bladder lesion outweighs the risk. Spouse seems to be in agreement at the moment.

## 2021-12-24 NOTE — PROGRESS NOTE ADULT - ASSESSMENT
ASSESSMENT AND PLAN: 61 year old Male with PMHx of HTN, DM, cardiac stent on ASA. CTH shows large posterior fossa bleed w/ IVH/SAH/hydro. Pt underwent SOC on 11/5. 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 Pt had VPS placed on 11/23 certas @4. 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 Also being treated for C.diff     NEURO: MRI done   Continue Tylenol for pain     PULM: On Trach collar   Continue Nebs    CV: HTN: metoprolol and cardura   Lipitor     ENDO: NPH and HISS     HEME/ONC:                      DVT ppx: Continue Eliquis     RENAL: IVL    ID: Afebrile. Continue PO vanco for C.diff   ID following    GI: Protonix for GI ppx     DISCHARGE PLANNING: PT/OT [facility]   Will D/w Neurosurgeon

## 2021-12-24 NOTE — CHART NOTE - NSCHARTNOTEFT_GEN_A_CORE
Pt went for MRI brain to r/o meningitis, on returning to floor certas shunt was checked with the digital  and found to still be correctly set at 4.   No additional changes made.   Will fu MRI results in AM.

## 2021-12-25 LAB
ANION GAP SERPL CALC-SCNC: 11 MMOL/L — SIGNIFICANT CHANGE UP (ref 5–17)
BUN SERPL-MCNC: 27 MG/DL — HIGH (ref 7–23)
CALCIUM SERPL-MCNC: 8.6 MG/DL — SIGNIFICANT CHANGE UP (ref 8.4–10.5)
CHLORIDE SERPL-SCNC: 106 MMOL/L — SIGNIFICANT CHANGE UP (ref 96–108)
CO2 SERPL-SCNC: 28 MMOL/L — SIGNIFICANT CHANGE UP (ref 22–31)
CREAT SERPL-MCNC: 0.75 MG/DL — SIGNIFICANT CHANGE UP (ref 0.5–1.3)
CULTURE RESULTS: SIGNIFICANT CHANGE UP
GLUCOSE SERPL-MCNC: 93 MG/DL — SIGNIFICANT CHANGE UP (ref 70–99)
POTASSIUM SERPL-MCNC: 3.6 MMOL/L — SIGNIFICANT CHANGE UP (ref 3.5–5.3)
POTASSIUM SERPL-SCNC: 3.6 MMOL/L — SIGNIFICANT CHANGE UP (ref 3.5–5.3)
SODIUM SERPL-SCNC: 145 MMOL/L — SIGNIFICANT CHANGE UP (ref 135–145)
SPECIMEN SOURCE: SIGNIFICANT CHANGE UP

## 2021-12-25 PROCEDURE — 99233 SBSQ HOSP IP/OBS HIGH 50: CPT

## 2021-12-25 RX ADMIN — Medication 125 MILLIGRAM(S): at 05:08

## 2021-12-25 RX ADMIN — CHLORHEXIDINE GLUCONATE 15 MILLILITER(S): 213 SOLUTION TOPICAL at 05:09

## 2021-12-25 RX ADMIN — HUMAN INSULIN 13 UNIT(S): 100 INJECTION, SUSPENSION SUBCUTANEOUS at 12:20

## 2021-12-25 RX ADMIN — Medication 2: at 12:19

## 2021-12-25 RX ADMIN — Medication 2 MILLIGRAM(S): at 05:09

## 2021-12-25 RX ADMIN — APIXABAN 5 MILLIGRAM(S): 2.5 TABLET, FILM COATED ORAL at 05:08

## 2021-12-25 RX ADMIN — CHLORHEXIDINE GLUCONATE 15 MILLILITER(S): 213 SOLUTION TOPICAL at 17:27

## 2021-12-25 RX ADMIN — APIXABAN 5 MILLIGRAM(S): 2.5 TABLET, FILM COATED ORAL at 17:26

## 2021-12-25 RX ADMIN — HUMAN INSULIN 13 UNIT(S): 100 INJECTION, SUSPENSION SUBCUTANEOUS at 23:08

## 2021-12-25 RX ADMIN — Medication 1 DROP(S): at 05:09

## 2021-12-25 RX ADMIN — HUMAN INSULIN 13 UNIT(S): 100 INJECTION, SUSPENSION SUBCUTANEOUS at 17:28

## 2021-12-25 RX ADMIN — SODIUM CHLORIDE 3 MILLILITER(S): 9 INJECTION INTRAMUSCULAR; INTRAVENOUS; SUBCUTANEOUS at 12:23

## 2021-12-25 RX ADMIN — SODIUM CHLORIDE 3 MILLILITER(S): 9 INJECTION INTRAMUSCULAR; INTRAVENOUS; SUBCUTANEOUS at 17:25

## 2021-12-25 RX ADMIN — Medication 3 MILLILITER(S): at 12:22

## 2021-12-25 RX ADMIN — Medication 4 MILLILITER(S): at 23:05

## 2021-12-25 RX ADMIN — Medication 2: at 17:28

## 2021-12-25 RX ADMIN — Medication 4 MILLILITER(S): at 17:26

## 2021-12-25 RX ADMIN — Medication 125 MILLIGRAM(S): at 12:21

## 2021-12-25 RX ADMIN — Medication 2: at 23:07

## 2021-12-25 RX ADMIN — HUMAN INSULIN 13 UNIT(S): 100 INJECTION, SUSPENSION SUBCUTANEOUS at 05:10

## 2021-12-25 RX ADMIN — Medication 1 TABLET(S): at 12:23

## 2021-12-25 RX ADMIN — Medication 3 MILLILITER(S): at 05:05

## 2021-12-25 RX ADMIN — NYSTATIN CREAM 1 APPLICATION(S): 100000 CREAM TOPICAL at 17:26

## 2021-12-25 RX ADMIN — Medication 1 DROP(S): at 23:04

## 2021-12-25 RX ADMIN — Medication 1 DROP(S): at 12:18

## 2021-12-25 RX ADMIN — ATORVASTATIN CALCIUM 40 MILLIGRAM(S): 80 TABLET, FILM COATED ORAL at 23:04

## 2021-12-25 RX ADMIN — Medication 3 MILLILITER(S): at 17:25

## 2021-12-25 RX ADMIN — NYSTATIN CREAM 1 APPLICATION(S): 100000 CREAM TOPICAL at 05:08

## 2021-12-25 RX ADMIN — Medication 4 MILLILITER(S): at 05:05

## 2021-12-25 RX ADMIN — Medication 125 MILLIGRAM(S): at 17:26

## 2021-12-25 RX ADMIN — Medication 3 MILLILITER(S): at 23:04

## 2021-12-25 RX ADMIN — PANTOPRAZOLE SODIUM 40 MILLIGRAM(S): 20 TABLET, DELAYED RELEASE ORAL at 12:21

## 2021-12-25 RX ADMIN — Medication 125 MILLIGRAM(S): at 23:05

## 2021-12-25 RX ADMIN — Medication 1 DROP(S): at 17:25

## 2021-12-25 RX ADMIN — SODIUM CHLORIDE 3 MILLILITER(S): 9 INJECTION INTRAMUSCULAR; INTRAVENOUS; SUBCUTANEOUS at 05:05

## 2021-12-25 RX ADMIN — Medication 12.5 MILLIGRAM(S): at 05:09

## 2021-12-25 RX ADMIN — SODIUM CHLORIDE 3 MILLILITER(S): 9 INJECTION INTRAMUSCULAR; INTRAVENOUS; SUBCUTANEOUS at 23:04

## 2021-12-25 RX ADMIN — Medication 12.5 MILLIGRAM(S): at 17:26

## 2021-12-25 RX ADMIN — Medication 4 MILLILITER(S): at 12:21

## 2021-12-25 NOTE — PROGRESS NOTE ADULT - SUBJECTIVE AND OBJECTIVE BOX
[facility] of [facility] Medicine  Lucia DO Rajinder  Pager (M-F, 8S-2H): [phone]  Other Times:  [phone]    Patient is a 61y old  Male who presents with a chief complaint of IVH/SAH (25 Dec 2021 11:41)      SUBJECTIVE / OVERNIGHT EVENTS: no events overnight. No fevers over last 24h. Patient is very lethargic, not responding appropriately.  ADDITIONAL REVIEW OF SYSTEMS: negative    MEDICATIONS  (STANDING):  acetylcysteine 20%  Inhalation 4 milliLiter(s) Inhalation every 6 hours  albuterol/ipratropium for Nebulization 3 milliLiter(s) Nebulizer every 6 hours  apixaban 5 milliGRAM(s) Oral two times a day  artificial  tears Solution 1 Drop(s) Both EYES every 6 hours  atorvastatin 40 milliGRAM(s) Oral at bedtime  chlorhexidine 0.12% Liquid 15 milliLiter(s) Oral Mucosa two times a day  dextrose 50% Injectable 25 Gram(s) IV Push once  dextrose 50% Injectable 12.5 Gram(s) IV Push once  doxazosin 2 milliGRAM(s) Oral daily  insulin lispro (ADMELOG) corrective regimen sliding scale   SubCutaneous every 6 hours  insulin NPH human recombinant 13 Unit(s) SubCutaneous every 6 hours  metoprolol tartrate 12.5 milliGRAM(s) Oral two times a day  multivitamin 1 Tablet(s) Oral daily  nystatin Powder 1 Application(s) Topical two times a day  pantoprazole  Injectable 40 milliGRAM(s) IV Push daily  sodium chloride 3%  Inhalation 3 milliLiter(s) Inhalation every 6 hours  vancomycin    Solution 125 milliGRAM(s) Oral every 6 hours    MEDICATIONS  (PRN):  acetaminophen    Suspension .. 650 milliGRAM(s) Oral every 6 hours PRN Temp greater or equal to 38C (100.4F), Mild Pain (1 - 3)      CAPILLARY BLOOD GLUCOSE      POCT Blood Glucose.: 177 mg/dL (25 Dec 2021 11:32)  POCT Blood Glucose.: 96 mg/dL (25 Dec 2021 04:53)  POCT Blood Glucose.: 134 mg/dL (24 Dec 2021 23:28)  POCT Blood Glucose.: 137 mg/dL (24 Dec 2021 18:17)  POCT Blood Glucose.: 143 mg/dL (24 Dec 2021 16:18)    I&O's Summary    24 Dec 2021 07:01  -  25 Dec 2021 07:00  --------------------------------------------------------  IN: 1045 mL / OUT: 600 mL / NET: 445 mL        PHYSICAL EXAM:  Vital Signs Last 24 Hrs  T(C): 36.6 (25 Dec 2021 09:00), Max: 37.7 (24 Dec 2021 20:00)  T(F): 97.9 (25 Dec 2021 09:00), Max: 99.9 (24 Dec 2021 20:00)  HR: 97 (25 Dec 2021 09:00) (90 - 99)  BP: 120/74 (25 Dec 2021 09:00) (107/69 - 130/69)  BP(mean): --  RR: 18 (25 Dec 2021 09:00) (18 - 20)  SpO2: 98% (25 Dec 2021 09:00) (97% - 100%)    CONSTITUTIONAL: NAD, well-developed, well-groomed  EYES: PERRLA; conjunctiva and sclera clear  ENMT: Moist oral mucosa, no pharyngeal injection or exudates; normal dentition  NECK: Supple, +trach site c/d/i  RESPIRATORY: Normal respiratory effort; lungs are clear to auscultation bilaterally  CARDIOVASCULAR: Regular rate and rhythm, normal S1 and S2, no murmur/rub/gallop; No lower extremity edema; Peripheral pulses are 2+ bilaterally  ABDOMEN: Soft, Nondistended, Nontender to palpation, normoactive bowel sounds, PEG site c/d/i, +rectal tube  MUSCULOSKELETAL:  No clubbing or cyanosis of digits; no joint swelling or tenderness to palpation  PSYCH: Alert, opens eyes to noxious stimuli, unable to assess orientation as nonverbal  NEUROLOGY: lethargic, not following commands, blinks his eyes but doesn't look up  SKIN: No rashes; no palpable lesions    LABS:                        9.3    9.52  )-----------( 355      ( 24 Dec 2021 12:30 )             29.8     12-25    145  |  106  |  27<H>  ----------------------------<  93  3.6   |  28  |  0.75    Ca    8.6      25 Dec 2021 06:18  Mg     2.2     12-24                Culture - Urine (collected 23 Dec 2021 17:21)  Source: Clean Catch Clean Catch (Midstream)  Final Report (24 Dec 2021 11:59):    No growth    Culture - Stool (collected 23 Dec 2021 15:00)  Source: .Stool Feces  Preliminary Report (24 Dec 2021 19:34):    No enteric pathogens to date: Final culture pending    GI PCR Panel, Stool (collected 23 Dec 2021 15:00)  Source: .Stool Feces  Final Report (23 Dec 2021 18:49):    GI PCR Results: NOT detected    *******Please Note:*******    GI panel PCR evaluates for:    Campylobacter, Plesiomonas shigelloides, Salmonella,    Vibrio, Yersinia enterocolitica, Enteroaggregative    Escherichia coli (EAEC), Enteropathogenic E.coli (EPEC),    Enterotoxigenic E. coli (ETEC) lt/st, Shiga-like    toxin-producing E. coli (STEC) stx1/stx2,    Shigella/ Enteroinvasive E. coli (EIEC), Cryptosporidium,    Cyclospora cayetanensis, Entamoeba histolytica,    Giardia lamblia, Adenovirus F 40/41, Astrovirus,    Norovirus GI/GII, Rotavirus A, Sapovirus    Culture - Blood (collected 23 Dec 2021 00:28)  Source: .Blood Blood  Preliminary Report (24 Dec 2021 01:02):    No growth to date.    Culture - Blood (collected 23 Dec 2021 00:28)  Source: .Blood Blood  Preliminary Report (24 Dec 2021 01:02):    No growth to date.

## 2021-12-25 NOTE — PROGRESS NOTE ADULT - SUBJECTIVE AND OBJECTIVE BOX
CC: f/u for  cdif relapse  Patient reports nothing not verbal    REVIEW OF SYSTEMS:  All other review of systems negative (Comprehensive ROS)    Antimicrobials Day #  :4  vancomycin    Solution 125 milliGRAM(s) Oral every 6 hours    Other Medications Reviewed    T(F): 97.4 (12-25-21 @ 19:47), Max: 99.6 (12-25-21 @ 15:33)  HR: 90 (12-25-21 @ 19:47)  BP: 128/79 (12-25-21 @ 19:47)  RR: 18 (12-25-21 @ 19:47)  SpO2: 99% (12-25-21 @ 19:47)  Wt(kg): --    PHYSICAL EXAM:  General: no acute distress  Eyes:  anicteric, no conjunctival injection, no discharge  Oropharynx: no lesions or injection 	  Neck: supple, without adenopathy, trach  Lungs: clear to auscultation  Heart: regular rate and rhythm; no murmur, rubs or gallops  Abdomen: soft, nondistended, nontender, without mass or organomegaly, peg  Skin: no lesions  Extremities: no clubbing, cyanosis, or edema  Neurologic: unresponsive   head wound healed    LAB RESULTS:                        9.3    9.52  )-----------( 355      ( 24 Dec 2021 12:30 )             29.8     12-25    145  |  106  |  27<H>  ----------------------------<  93  3.6   |  28  |  0.75    Ca    8.6      25 Dec 2021 06:18  Mg     2.2     12-24          MICROBIOLOGY:  RECENT CULTURES:  12-23 @ 17:21 Clean Catch Clean Catch (Midstream)     No growth      12-23 @ 15:00 .Stool Feces     GI PCR Results: NOT detected  *******Please Note:*******  GI panel PCR evaluates for:  Campylobacter, Plesiomonas shigelloides, Salmonella,  Vibrio, Yersinia enterocolitica, Enteroaggregative  Escherichia coli (EAEC), Enteropathogenic E.coli (EPEC),  Enterotoxigenic E. coli (ETEC) lt/st, Shiga-like  toxin-producing E. coli (STEC) stx1/stx2,  Shigella/ Enteroinvasive E. coli (EIEC), Cryptosporidium,  Cyclospora cayetanensis, Entamoeba histolytica,  Giardia lamblia, Adenovirus F 40/41, Astrovirus,  Norovirus GI/GII, Rotavirus A, Sapovirus      12-23 @ 00:28 .Blood Blood     No growth to date.          RADIOLOGY REVIEWED:  mri head  MPRESSION: Postop changes are again identified with extra axial   collection seen in the postoperative region. This could be compatible   with pseudomeningocele, though the possibility of abscess or dural leak   cannot entirely excluded. Clinical correlation and continued close   interval is recommended.    Small foci of abnormal restricted diffusion is seen involving the right   centrum semiovale and medial right frontal cortex. These findings are   likely compatible with acute lacunar infarcts.                Assessment:  Patient admitted several weeks ago with cerebellar bleed, soc for decompresion then pica aneurysm resection , vps, s/p trach and peg. He had cdif that got better and had tx for pneumonia. A few days ago fever again with diarrhea, cdif relapse now getting better on vanco. 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

## 2021-12-25 NOTE — PROGRESS NOTE ADULT - THIS PATIENT HAS THE FOLLOWING CONDITION(S)/DIAGNOSES ON THIS ADMISSION:
acute post op blood loss anemia- s/p transfusion PRBCs, now greatly improved witll trend/Encephalopathy/Acute Blood Loss Anemia

## 2021-12-25 NOTE — PROGRESS NOTE ADULT - ASSESSMENT
61 year old man with respiratory failure d/t ICH    1. ICH  -per neurosurgery,  -s/p  shunt  -for MRI to r/o meningitis    2. Respiratory failure  -for tracheostomy, will require long term enteral nutrition  -s/p PEG, continue use of abdominal binder  - aspiration precautions     3. Enterobacter PNA  -s/p course of antibiotics     4. Anemia, no overt GI bleed. EGD unremarkable.   -hgb stable    5. Cdiff infection , pt now w recurrent diarrhea, fever. Concern for cdiff relapse, vanco resumed, appreciate ID. Difficult to discern if this is cdiff relpase vs diarrhea from tube feeds. Will monitor response to abx. Please document output from rectal tube    6. DVT on a/c  -apixaban 5mg resumed  -continue PPI       Attending supervision statement: I have personally seen and examined the patient. I fully participated in the care of this patient. I have made amendments to the documentation where necessary, and agree with the history, physical exam, and plan as outlined by the ACP.          [facility]  Gastroenterology and Hepatology  [address] [place], [place]  Office: [phone]  Cell: [phone]

## 2021-12-25 NOTE — PROGRESS NOTE ADULT - ASSESSMENT
61M with PMH of HTN, CAD s/p stent on DAPT, T2DM who complained of headache and subsequently became unresponsive found to have posterior fossa hemorrhage, IVH, hydrocephalus, s/p EVD and SOC on 11/4. 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 Course c/b respiratory failure and dysphagia s/p trach/PEG (11/19), VPS (11/23),Certas @4 completed course of cefepime for enterobacter and pseudomonas pneumonia on 11/21, fevers, c. diff colitis, urinary retention, b/l distal DVT. Trach changed 12/15 to #7 cuffless due to dislodgement. 12/16 CT A/P wo concern for infectious process. but concern for bladder lesion, wife will pursue possible treatment after rehab. Now with another bout of cdiff found after fever workup, on PO vanco per ID, blood cultures negative to date. +Rectal tube     PLAN:  Neuro:   - shunt  Certas @4  - PICA aneurysm resection  - pt is DNR  Respiratory:   - continue duoneb and mucomyst  - trach care, suction prn  - PE/DVT- on apixaban  CV:  - acute post op blood loss anemia improved after transfusion PRBC 12/22  - HTN- continue metoprolol  - on doxazosin for urinary retention  Endocrine:   - DMT2- continue fingersticks with NPH 13u q6  DVT ppx:   - on eliquis for dvt  Renal:   - hypernatremia- Na 145, continue free water 200q6  - MICHELLE has resolved  -Urinary retention- requiring straight cath q6  - Urology following for renal lesion, PSA/Cytology pending, GOC necessary for any urologic intervention  - f/u urine cytology is negative for high grade urothelial carcinoma  ID:   - recurrent fever and diarrhea- cdiff again positive, PO vanco restarted (completed course earlier in stay)- ID recs appreciated  - MRI as above  - follow up CT scan of C/A/P to look for occult source of infection, found to have 2.4 cm nodular soft tissue density, concerning for bladder lesion, 1.7 cm left renal lesion  - CTX for pulmonary coverage if he deteriorates per ID  GI:    - tolerating tube feeds via PEG  PT/OT:   - CHUYITA    Per Hospitalist note, it may be feasible at this time to monitor patient's progress for meaningful recovery over weeks, months and then decide whether there is benefit of pursuing workup of bladder lesion. Spouse in agreement.  spouse updated via phone and questions answered     will discuss with Dr Camilo Ambrose # [id number]

## 2021-12-25 NOTE — PROGRESS NOTE ADULT - SUBJECTIVE AND OBJECTIVE BOX
SUBJECTIVE: Pt seen and examined, afebrile overnight, rectal tube in place 200cc  on isolation- cdiff positive, on PO vanco    Vital Signs Last 24 Hrs  T(C): 36.6 (25 Dec 2021 09:00), Max: 37.7 (24 Dec 2021 20:00)  T(F): 97.9 (25 Dec 2021 09:00), Max: 99.9 (24 Dec 2021 20:00)  HR: 97 (25 Dec 2021 09:00) (90 - 99)  BP: 120/74 (25 Dec 2021 09:00) (107/69 - 130/69)  BP(mean): --  RR: 18 (25 Dec 2021 09:00) (18 - 20)  SpO2: 98% (25 Dec 2021 09:00) (97% - 100%)    PHYSICAL EXAM:    General: No Acute Distress     Neurological: Awake, eyes open with downward gaze,  trach collar, intermittently FC (squeezes hand), UE 0/5, b/l LE wds    Pulmonary: Clear to Auscultation, No Rales, No Rhonchi, No Wheezes     Cardiovascular: S1, S2, Regular Rate and Rhythm     Gastrointestinal: Soft, Nontender, Nondistended     Incision: healed incision    LABS:                                  9.3    9.52  )-----------( 355      ( 24 Dec 2021 12:30 )             29.8   12-25    145  |  106  |  27<H>  ----------------------------<  93  3.6   |  28  |  0.75    Ca    8.6      25 Dec 2021 06:18  Mg     2.2     12-24       MEDICATIONS  (STANDING):  acetylcysteine 20%  Inhalation 4 milliLiter(s) Inhalation every 6 hours  albuterol/ipratropium for Nebulization 3 milliLiter(s) Nebulizer every 6 hours  apixaban 5 milliGRAM(s) Oral two times a day  artificial  tears Solution 1 Drop(s) Both EYES every 6 hours  atorvastatin 40 milliGRAM(s) Oral at bedtime  chlorhexidine 0.12% Liquid 15 milliLiter(s) Oral Mucosa two times a day  dextrose 50% Injectable 25 Gram(s) IV Push once  dextrose 50% Injectable 12.5 Gram(s) IV Push once  doxazosin 2 milliGRAM(s) Oral daily  insulin lispro (ADMELOG) corrective regimen sliding scale   SubCutaneous every 6 hours  insulin NPH human recombinant 13 Unit(s) SubCutaneous every 6 hours  metoprolol tartrate 12.5 milliGRAM(s) Oral two times a day  multivitamin 1 Tablet(s) Oral daily  nystatin Powder 1 Application(s) Topical two times a day  pantoprazole  Injectable 40 milliGRAM(s) IV Push daily  sodium chloride 3%  Inhalation 3 milliLiter(s) Inhalation every 6 hours  vancomycin    Solution 125 milliGRAM(s) Oral every 6 hours    MEDICATIONS  (PRN):  acetaminophen    Suspension .. 650 milliGRAM(s) Oral every 6 hours PRN Temp greater or equal to 38C (100.4F), Mild Pain (1 - 3)    DIET: [] Regular [] CCD [] Renal [] Puree [] Dysphagia [x] Tube Feeds:     IMAGING:     < from: MR Head w/wo IV Cont (12.23.21 @ 23:50) >  INTERPRETATION:  Clinical indication: Fevers. Suspected abscess    MRI of the brain was performed using sagittal T1, axial T1 T2 T2 FLAIR   diffusionand susceptibility weighted sequence. The patient was injected   with approximately 8.5 cc of gadavist IV with 1.5 cc of contrast   discarded. Sagittal coronal and axial T1-weighted sequences were   performed.    This exam is compared with prior noncontrast head CT performed on   December 12, 2021    This exam is somewhat limited by shunt reservoir artifact.    Postoperative changes compatible suboccipital craniectomy is again   identified. There is evidence of extra-axial collection identified in   this postoperative region with some adjacent areas of enhancement   involving the parenchymal region. This is seen in the midline and as well   as the right side. This extra axial collection measures approximately 3.0   x 7.5 x 6.2 cm and could becompatible with a pseudomeningocele, though   the possibility of underlying abscess or dural leak must be considered.   Clinical correlation and continued close interval follow-up is recommended    Small bilateral dural enhancement is identified right greater than left.   This is likely related to postop changes.    Extra-axial collections are seen involving the bilateral posterior fossa   region. The collection left side measures approximately 1.4 cm widest   diameter and the collection on the right side measures approximately 0.6   on in widest diameter.    There is abnormal T1 shortening identified in the postoperative bed just   posterior to the fourth ventricle. This is compatible areas of hemorrhage   and a postop material. There is thick irregular peripheral enhancement   identified around the periphery of postop bed which could be compatible   with postop changes the possibility of residual recurrent tumor cannot be   entirely excluded. Continued close interval follow-up is is recommended.    Right parietal shunt catheter is again seen with associated T2   prolongation along the shunt catheter tract.    The left lateral ventricle is slightly larger than the right. No evidence   of hydrocephalus is seen    Parenchymal volume loss is identified.    Abnormal T2 prolongation is identified in the periventricular white   matter region.    Small focus of abnormal T2 prolongation with restricted diffusion is seen   involving the right posterior centrum semiovale region. Small areaseen   involving the high medial right frontal cortex is well. These findings   could be compatible with acute lacunar infarcts.    Scattered areas of abnormal susceptibility involving the right posterior   frontal/parietal and inferior left parietalregion as well as the   postoperative and left cerebellar region. These findings are likely   compatible areas of old hemorrhage.    Minimal mucosal thickening is seen involving the right sphenoid sinus.    Inflammatory changes involving both mastoidand middle ear regions left   greater than right. This compatible underlying inflammatory change.    IMPRESSION: Postop changes are again identified with extra axial   collection seen in the postoperative region. This could be compatible   with pseudomeningocele, though the possibility of abscess or dural leak   cannot entirely excluded. Clinical correlation and continued close   interval is recommended.    Small foci of abnormal restricted diffusion is seen involving the right   centrum semiovale and medial right frontal cortex. These findings are   likely compatible with acute lacunar infarcts.    < end of copied text >             from: CT Head No Cont (12.12.21 @ 15:38) >    IMPRESSION:    Similar ventricular size when compared to 12/9/2021.    < end of copied text >  < from: VA Duplex Lower Ext Vein Scan, Bilat (12.08.21 @ 12:21) >    IMPRESSION:    Persistent bilateral below the knees deep venous thrombosis in the   bilateral calf veins. No propagation.    < end of copied text >  < from: CT Abdomen and Pelvis w/ IV Cont (12.16.21 @ 09:26) >    IMPRESSION:  A 2.4 cm nodular soft tissue density in the bladder appears separate from   the prostate gland, concerning for bladder lesion. Correlate with   cystoscopy.    A 2.7 cm fluid collection adjacent to the right ischial tuberosity, may   be related to ischiogluteal bursitis.    Indeterminant 1.7 cm left renal lesion. Contrast-enhanced MRI can be   performed further evaluation.    Mild fatty infiltration along the PEG tube catheter without drainable   fluid collection.    < end of copied text >

## 2021-12-25 NOTE — PROGRESS NOTE ADULT - PROBLEM SELECTOR PLAN 11
CT a/p with a 2.4 cm nodular soft tissue density in the bladder appears separate from the prostate gland, concerning for bladder lesion  previous hospitalist d/w urology - findings concerning for bladder tumor vs prostate gland  PSA normal, urine cytology negative for malignancy  previous hospitalist spoke to urology team regarding cysto transurethral resection which requires general anesthesia. Per , recommend to f/u urine cytology result to determine next steps - will need to f/u with urology once acute issues improve.     Previous hospitalist spoke to the patient's wife, Sandrita Bauer [phone]. Given the patient's underlying comorbid conditions coupled with his recent devastating neurologic events, other medical complications that ensued, and his poor neurologic and functional status at this time, it may be prudent to monitor his progress and see if he makes any meaningful recovery over the coming weeks to months to decide whether the benefit of pursuing any invasive work up for the bladder lesion outweighs the risk. Spouse seems to be in agreement at the moment.

## 2021-12-26 PROCEDURE — 99233 SBSQ HOSP IP/OBS HIGH 50: CPT

## 2021-12-26 RX ADMIN — Medication 3 MILLILITER(S): at 11:49

## 2021-12-26 RX ADMIN — CHLORHEXIDINE GLUCONATE 15 MILLILITER(S): 213 SOLUTION TOPICAL at 17:05

## 2021-12-26 RX ADMIN — Medication 4 MILLILITER(S): at 05:20

## 2021-12-26 RX ADMIN — HUMAN INSULIN 13 UNIT(S): 100 INJECTION, SUSPENSION SUBCUTANEOUS at 11:46

## 2021-12-26 RX ADMIN — Medication 4 MILLILITER(S): at 17:00

## 2021-12-26 RX ADMIN — Medication 1 TABLET(S): at 11:49

## 2021-12-26 RX ADMIN — SODIUM CHLORIDE 3 MILLILITER(S): 9 INJECTION INTRAMUSCULAR; INTRAVENOUS; SUBCUTANEOUS at 23:05

## 2021-12-26 RX ADMIN — NYSTATIN CREAM 1 APPLICATION(S): 100000 CREAM TOPICAL at 17:07

## 2021-12-26 RX ADMIN — Medication 125 MILLIGRAM(S): at 05:22

## 2021-12-26 RX ADMIN — Medication 1 DROP(S): at 05:18

## 2021-12-26 RX ADMIN — Medication 4 MILLILITER(S): at 23:05

## 2021-12-26 RX ADMIN — Medication 125 MILLIGRAM(S): at 17:06

## 2021-12-26 RX ADMIN — APIXABAN 5 MILLIGRAM(S): 2.5 TABLET, FILM COATED ORAL at 17:07

## 2021-12-26 RX ADMIN — Medication 4 MILLILITER(S): at 11:46

## 2021-12-26 RX ADMIN — Medication 12.5 MILLIGRAM(S): at 17:07

## 2021-12-26 RX ADMIN — HUMAN INSULIN 13 UNIT(S): 100 INJECTION, SUSPENSION SUBCUTANEOUS at 17:06

## 2021-12-26 RX ADMIN — Medication 2: at 11:47

## 2021-12-26 RX ADMIN — Medication 125 MILLIGRAM(S): at 11:46

## 2021-12-26 RX ADMIN — Medication 2: at 17:05

## 2021-12-26 RX ADMIN — APIXABAN 5 MILLIGRAM(S): 2.5 TABLET, FILM COATED ORAL at 05:23

## 2021-12-26 RX ADMIN — Medication 1 DROP(S): at 17:05

## 2021-12-26 RX ADMIN — SODIUM CHLORIDE 3 MILLILITER(S): 9 INJECTION INTRAMUSCULAR; INTRAVENOUS; SUBCUTANEOUS at 11:48

## 2021-12-26 RX ADMIN — Medication 1 DROP(S): at 23:04

## 2021-12-26 RX ADMIN — HUMAN INSULIN 13 UNIT(S): 100 INJECTION, SUSPENSION SUBCUTANEOUS at 23:15

## 2021-12-26 RX ADMIN — SODIUM CHLORIDE 3 MILLILITER(S): 9 INJECTION INTRAMUSCULAR; INTRAVENOUS; SUBCUTANEOUS at 17:07

## 2021-12-26 RX ADMIN — Medication 1 DROP(S): at 11:46

## 2021-12-26 RX ADMIN — Medication 3 MILLILITER(S): at 16:59

## 2021-12-26 RX ADMIN — Medication 2 MILLIGRAM(S): at 06:43

## 2021-12-26 RX ADMIN — ATORVASTATIN CALCIUM 40 MILLIGRAM(S): 80 TABLET, FILM COATED ORAL at 21:03

## 2021-12-26 RX ADMIN — CHLORHEXIDINE GLUCONATE 15 MILLILITER(S): 213 SOLUTION TOPICAL at 05:19

## 2021-12-26 RX ADMIN — NYSTATIN CREAM 1 APPLICATION(S): 100000 CREAM TOPICAL at 05:19

## 2021-12-26 RX ADMIN — Medication 125 MILLIGRAM(S): at 23:06

## 2021-12-26 RX ADMIN — Medication 3 MILLILITER(S): at 23:04

## 2021-12-26 RX ADMIN — SODIUM CHLORIDE 3 MILLILITER(S): 9 INJECTION INTRAMUSCULAR; INTRAVENOUS; SUBCUTANEOUS at 05:23

## 2021-12-26 RX ADMIN — HUMAN INSULIN 13 UNIT(S): 100 INJECTION, SUSPENSION SUBCUTANEOUS at 05:24

## 2021-12-26 RX ADMIN — Medication 3 MILLILITER(S): at 05:19

## 2021-12-26 RX ADMIN — Medication 12.5 MILLIGRAM(S): at 05:22

## 2021-12-26 RX ADMIN — PANTOPRAZOLE SODIUM 40 MILLIGRAM(S): 20 TABLET, DELAYED RELEASE ORAL at 11:49

## 2021-12-26 NOTE — PROGRESS NOTE ADULT - PROBLEM SELECTOR PLAN 11
CT a/p with a 2.4 cm nodular soft tissue density in the bladder appears separate from the prostate gland, concerning for bladder lesion  previous hospitalist d/w urology - findings concerning for bladder tumor vs prostate gland  PSA normal, urine cytology negative for malignancy  previous hospitalist spoke to urology team regarding cysto transurethral resection which requires general anesthesia. Per , recommend to f/u urine cytology result to determine next steps - will need to f/u with urology once acute issues improve.     Previous hospitalist spoke to the patient's wife, Sandrita Bauer [phone]. Given the patient's underlying comorbid conditions coupled with his recent devastating neurologic events, other medical complications that ensued, and his poor neurologic and functional status at this time, it may be prudent to monitor his progress and see if he makes any meaningful recovery over the coming weeks to months to decide whether the benefit of pursuing any invasive work up for the bladder lesion outweighs the risk. Spouse seems to be in agreement at the moment.

## 2021-12-26 NOTE — PROGRESS NOTE ADULT - ASSESSMENT
61 year old man with respiratory failure d/t ICH    1. ICH  -per neurosurgery,  -s/p  shunt  -for MRI to r/o meningitis    2. Respiratory failure  -for tracheostomy, will require long term enteral nutrition  -s/p PEG, continue use of abdominal binder  - aspiration precautions     3. Enterobacter PNA  -s/p course of antibiotics     4. Anemia, no overt GI bleed. EGD unremarkable.   -hgb stable    ·	5. Cdiff infection pt with only 200cc in rectal tube overnight, appears to be improving    6. DVT on a/c  -apixaban 5mg resumed  -continue PPI       Attending supervision statement: I have personally seen and examined the patient. I fully participated in the care of this patient. I have made amendments to the documentation where necessary, and agree with the history, physical exam, and plan as outlined by the ACP.          [facility]  Gastroenterology and Hepatology  [address] [place], [place]  Office: [phone]  Cell: [phone]

## 2021-12-26 NOTE — PROGRESS NOTE ADULT - PROBLEM SELECTOR PLAN 1
new recurrent fever on 12/22, 12/23 with worsening diarrhea   - CXR with clear lungs  - UA with pyuria, leuk est, f/u urine cx - ngtd  - f/u blood cx - ngtd  - stool cx ngtd  - ID following, recs appreciated  - restarted on empiric PO vanco on 12/22 for presumed recurrent c. diff infection, plan for 14 days of therapy then monthlong taper

## 2021-12-26 NOTE — PROGRESS NOTE ADULT - ASSESSMENT
61M with PMH of HTN, CAD s/p stent on DAPT, T2DM who complained of headache and subsequently became unresponsive found to have posterior fossa hemorrhage, IVH, hydrocephalus, s/p EVD and SOC on 11/4. 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 Course c/b respiratory failure and dysphagia s/p trach/PEG (11/19), VPS (11/23),Certas @4 completed course of cefepime for enterobacter and pseudomonas pneumonia on 11/21, fevers, c. diff colitis, urinary retention, b/l distal DVT. Trach changed 12/15 to #7 cuffless due to dislodgement. 12/16 CT A/P wo concern for infectious process. but concern for bladder lesion, wife will pursue possible treatment after rehab. Now with another bout of cdiff found after fever workup, on PO vanco per ID, blood cultures negative to date. +Rectal tube     PLAN:  - neuro and vital check Q4hrs  - VPS setting: certas @4  - MRI on 12/23 showed postop changes are again identified with extra axial collection seen in the postoperative region. This could be compatible with pseudomeningocele; Small foci of abnormal restricted diffusion is seen involving the right   centrum semiovale and medial right frontal cortex.   - HTN, currently well controlled, continue lopressor  - CAD s/p cardiac stents, will resume ASA when approved by Dr. Page  - s/p trach, now on trach collar, continue trach care, continue duoneb and mucomyst, suction as needed  - acute post op blood loss anemia, improved s/p transfusion PRBC on 12/22, will f/u repeat in am  - hypernatremia, last Na 145, continue free water 200Q6  - continue doxazosin for urinary retention, straight cath Q6hrs as needed  - follow up CT scan of C/A/P to look for occult source of infection, found to have 2.4 cm nodular soft tissue density, concerning for bladder lesion, 1.7 cm left renal lesion; findings concerning for bladder tumor vs prostate gland  PSA normal, urine cytology negative for malignancy; Per previous hospitalist spoke to urology team regarding cysto transurethral resection which requires general anesthesia. Per , recommend to f/u urine cytology result to determine next steps - will need to f/u with urology once acute issues improve. Previous hospitalist spoke to the patient's wife, Sandrita Bauer [phone]. Given the patient's underlying comorbid conditions coupled with his recent devastating neurologic events, other medical complications that ensued, and his poor neurologic and functional status at this time, it may be prudent to monitor his progress and see if he makes any meaningful recovery over the coming weeks to months to decide whether the benefit of pursuing any invasive work up for the bladder lesion outweighs the risk. Spouse seems to be in agreement at the moment  - recurrent C. diff, continue rectal tube for liquidy BMs, continue PO vanco, ID following  - T2DM, continue NPH 13units Q6hrs with ISS  - tolerating tube feeds via PEG  - continue protonix daily  - code status: DNR  - activity increase as tolerated  - PE/DVT, continue apixaban  - Disposition: PT/OT-[facility]    Patient's wife updated via phone ([phone]). All questions and concerns were addressed.    will discuss above with Dr. Camilo zpaata [phone]

## 2021-12-26 NOTE — PROGRESS NOTE ADULT - SUBJECTIVE AND OBJECTIVE BOX
[facility] Division of Hospital Medicine  Lucia DO Rajinder  Pager ([place]-F, 6E-1Q): [phone]  Other Times:  [phone]    Patient is a 61y old  Male who presents with a chief complaint of IVH/SAH (25 Dec 2021 11:41)      SUBJECTIVE / OVERNIGHT EVENTS: no events. Patient continues to be lethargic, not responding. He blinks does not look anywhere purposefully.  ADDITIONAL REVIEW OF SYSTEMS: negative    MEDICATIONS  (STANDING):  acetylcysteine 20%  Inhalation 4 milliLiter(s) Inhalation every 6 hours  albuterol/ipratropium for Nebulization 3 milliLiter(s) Nebulizer every 6 hours  apixaban 5 milliGRAM(s) Oral two times a day  artificial  tears Solution 1 Drop(s) Both EYES every 6 hours  atorvastatin 40 milliGRAM(s) Oral at bedtime  chlorhexidine 0.12% Liquid 15 milliLiter(s) Oral Mucosa two times a day  dextrose 50% Injectable 25 Gram(s) IV Push once  dextrose 50% Injectable 12.5 Gram(s) IV Push once  doxazosin 2 milliGRAM(s) Oral daily  insulin lispro (ADMELOG) corrective regimen sliding scale   SubCutaneous every 6 hours  insulin NPH human recombinant 13 Unit(s) SubCutaneous every 6 hours  metoprolol tartrate 12.5 milliGRAM(s) Oral two times a day  multivitamin 1 Tablet(s) Oral daily  nystatin Powder 1 Application(s) Topical two times a day  pantoprazole  Injectable 40 milliGRAM(s) IV Push daily  sodium chloride 3%  Inhalation 3 milliLiter(s) Inhalation every 6 hours  vancomycin    Solution 125 milliGRAM(s) Oral every 6 hours    MEDICATIONS  (PRN):  acetaminophen    Suspension .. 650 milliGRAM(s) Oral every 6 hours PRN Temp greater or equal to 38C (100.4F), Mild Pain (1 - 3)      CAPILLARY BLOOD GLUCOSE      POCT Blood Glucose.: 122 mg/dL (26 Dec 2021 05:15)  POCT Blood Glucose.: 190 mg/dL (25 Dec 2021 23:07)  POCT Blood Glucose.: 175 mg/dL (25 Dec 2021 17:17)  POCT Blood Glucose.: 177 mg/dL (25 Dec 2021 11:32)    I&O's Summary    25 Dec 2021 07:01  -  26 Dec 2021 07:00  --------------------------------------------------------  IN: 1100 mL / OUT: 1300 mL / NET: -200 mL        PHYSICAL EXAM:  Vital Signs Last 24 Hrs  T(C): 36.7 (26 Dec 2021 08:04), Max: 37.7 (26 Dec 2021 05:05)  T(F): 98.1 (26 Dec 2021 08:04), Max: 99.9 (26 Dec 2021 05:05)  HR: 100 (26 Dec 2021 08:04) (90 - 100)  BP: 107/69 (26 Dec 2021 08:04) (107/69 - 131/78)  BP(mean): --  RR: 18 (26 Dec 2021 08:04) (18 - 18)  SpO2: 96% (26 Dec 2021 08:04) (90% - 100%)    CONSTITUTIONAL: NAD, well-developed, well-groomed  EYES: PERRLA; conjunctiva and sclera clear  ENMT: Moist oral mucosa, no pharyngeal injection or exudates; normal dentition  NECK: Supple, +trach site c/d/i  RESPIRATORY: Normal respiratory effort; lungs are clear to auscultation bilaterally  CARDIOVASCULAR: Regular rate and rhythm, normal S1 and S2, no murmur/rub/gallop; No lower extremity edema; Peripheral pulses are 2+ bilaterally  ABDOMEN: Soft, Nondistended, Nontender to palpation, normoactive bowel sounds, PEG site c/d/i, +rectal tube  MUSCULOSKELETAL:  No clubbing or cyanosis of digits; no joint swelling or tenderness to palpation  PSYCH: Alert, opens eyes to noxious stimuli, unable to assess orientation as nonverbal  NEUROLOGY: lethargic, not following commands, blinks his eyes but doesn't look up  SKIN: No rashes; no palpable lesions    LABS:                        9.3    9.52  )-----------( 355      ( 24 Dec 2021 12:30 )             29.8     12-25    145  |  106  |  27<H>  ----------------------------<  93  3.6   |  28  |  0.75    Ca    8.6      25 Dec 2021 06:18                Culture - Urine (collected 23 Dec 2021 17:21)  Source: Clean Catch Clean Catch (Midstream)  Final Report (24 Dec 2021 11:59):    No growth    Culture - Stool (collected 23 Dec 2021 15:00)  Source: .Stool Feces  Final Report (25 Dec 2021 15:56):    No enteric pathogens isolated.    (Stool culture examined for Salmonella,    Shigella, Campylobacter, Aeromonas, Plesiomonas,    Vibrio, E.coli O157 and Yersinia)    GI PCR Panel, Stool (collected 23 Dec 2021 15:00)  Source: .Stool Feces  Final Report (23 Dec 2021 18:49):    GI PCR Results: NOT detected    *******Please Note:*******    GI panel PCR evaluates for:    Campylobacter, Plesiomonas shigelloides, Salmonella,    Vibrio, Yersinia enterocolitica, Enteroaggregative    Escherichia coli (EAEC), Enteropathogenic E.coli (EPEC),    Enterotoxigenic E. coli (ETEC) lt/st, Shiga-like    toxin-producing E. coli (STEC) stx1/stx2,    Shigella/ Enteroinvasive E. coli (EIEC), Cryptosporidium,    Cyclospora cayetanensis, Entamoeba histolytica,    Giardia lamblia, Adenovirus F 40/41, Astrovirus,    Norovirus GI/GII, Rotavirus A, Sapovirus

## 2021-12-26 NOTE — PROGRESS NOTE ADULT - SUBJECTIVE AND OBJECTIVE BOX
Patient was seen at bedside this am. Patient appeared comfortable. No acute distress noted.    OVERNIGHT EVENTS: No acute event overnight    Vital Signs Last 24 Hrs  T(C): 36.7 (26 Dec 2021 08:04), Max: 37.7 (26 Dec 2021 05:05)  T(F): 98.1 (26 Dec 2021 08:04), Max: 99.9 (26 Dec 2021 05:05)  HR: 100 (26 Dec 2021 08:04) (90 - 100)  BP: 107/69 (26 Dec 2021 08:04) (107/69 - 131/78)  BP(mean): --  RR: 18 (26 Dec 2021 08:04) (18 - 18)  SpO2: 96% (26 Dec 2021 08:04) (90% - 100%)    I&O's Detail    25 Dec 2021 07:01  -  26 Dec 2021 07:00  --------------------------------------------------------  IN:    Free Water: 500 mL    Glucerna: 600 mL  Total IN: 1100 mL    OUT:    Intermittent Catheterization - Urethral (mL): 1300 mL  Total OUT: 1300 mL    Total NET: -200 mL        I&O's Summary    25 Dec 2021 07:01  -  26 Dec 2021 07:00  --------------------------------------------------------  IN: 1100 mL / OUT: 1300 mL / NET: -200 mL        PHYSICAL EXAM:  Neurological:  awake, eye open spontaneously with downward gaze, on trach collar, nonverbal, not FC, RUE trace wds to pain, LUE 0/5, b/l LEs trace movement to pain  Cardiovascular: +s1, s2  Respiratory: clear to auscultation b/l  Gastrointestinal: soft, non-distended, non-tender  Extremities: warm, dry  Incision/Wound: incision site C/D/I    LABS:                        9.3    9.52  )-----------( 355      ( 24 Dec 2021 12:30 )             29.8     12-25    145  |  106  |  27<H>  ----------------------------<  93  3.6   |  28  |  0.75    Ca    8.6      25 Dec 2021 06:18              CAPILLARY BLOOD GLUCOSE      POCT Blood Glucose.: 189 mg/dL (26 Dec 2021 11:44)  POCT Blood Glucose.: 122 mg/dL (26 Dec 2021 05:15)  POCT Blood Glucose.: 190 mg/dL (25 Dec 2021 23:07)  POCT Blood Glucose.: 175 mg/dL (25 Dec 2021 17:17)      Drug Levels: [] N/A    CSF Analysis: [] N/A      Allergies    penicillin (Other)    Intolerances      MEDICATIONS:  Antibiotics:  vancomycin    Solution 125 milliGRAM(s) Oral every 6 hours    Neuro:  acetaminophen    Suspension .. 650 milliGRAM(s) Oral every 6 hours PRN    Anticoagulation:  apixaban 5 milliGRAM(s) Oral two times a day    OTHER:  acetylcysteine 20%  Inhalation 4 milliLiter(s) Inhalation every 6 hours  albuterol/ipratropium for Nebulization 3 milliLiter(s) Nebulizer every 6 hours  artificial  tears Solution 1 Drop(s) Both EYES every 6 hours  atorvastatin 40 milliGRAM(s) Oral at bedtime  chlorhexidine 0.12% Liquid 15 milliLiter(s) Oral Mucosa two times a day  dextrose 50% Injectable 25 Gram(s) IV Push once  dextrose 50% Injectable 12.5 Gram(s) IV Push once  doxazosin 2 milliGRAM(s) Oral daily  insulin lispro (ADMELOG) corrective regimen sliding scale   SubCutaneous every 6 hours  insulin NPH human recombinant 13 Unit(s) SubCutaneous every 6 hours  metoprolol tartrate 12.5 milliGRAM(s) Oral two times a day  nystatin Powder 1 Application(s) Topical two times a day  pantoprazole  Injectable 40 milliGRAM(s) IV Push daily  sodium chloride 3%  Inhalation 3 milliLiter(s) Inhalation every 6 hours    IVF:  multivitamin 1 Tablet(s) Oral daily    CULTURES:  Culture Results:   No growth (12-23 @ 17:21)  Culture Results:   GI PCR Results: NOT detected  *******Please Note:*******  GI panel PCR evaluates for:  Campylobacter, Plesiomonas shigelloides, Salmonella,  Vibrio, Yersinia enterocolitica, Enteroaggregative  Escherichia coli (EAEC), Enteropathogenic E.coli (EPEC),  Enterotoxigenic E. coli (ETEC) lt/st, Shiga-like  toxin-producing E. coli (STEC) stx1/stx2,  Shigella/ Enteroinvasive E. coli (EIEC), Cryptosporidium,  Cyclospora cayetanensis, Entamoeba histolytica,  Giardia lamblia, Adenovirus F 40/41, Astrovirus,  Norovirus GI/GII, Rotavirus A, Sapovirus (12-23 @ 15:00)    RADIOLOGY & ADDITIONAL TESTS:

## 2021-12-26 NOTE — PROGRESS NOTE ADULT - SUBJECTIVE AND OBJECTIVE BOX
CC: f/u for    Patient reports    REVIEW OF SYSTEMS:  All other review of systems negative (Comprehensive ROS)    Antimicrobials Day #  :5/14  vancomycin    Solution 125 milliGRAM(s) Oral every 6 hours    Other Medications Reviewed    T(F): 99.8 (12-26-21 @ 15:32), Max: 99.9 (12-26-21 @ 05:05)  HR: 92 (12-26-21 @ 15:32)  BP: 116/92 (12-26-21 @ 15:32)  RR: 19 (12-26-21 @ 15:32)  SpO2: 99% (12-26-21 @ 15:32)  Wt(kg): --    PHYSICAL EXAM:  General: not interactive at all, no acute distress  Eyes:  anicteric, no conjunctival injection, no discharge  Oropharynx: no lesions or injection 	  Neck: supple, without adenopathy  Lungs: clear to auscultation  Heart: regular rate and rhythm; no murmur, rubs or gallops  Abdomen: soft, nondistended, nontender, without mass or organomegaly  Skin: no lesions  Extremities: no clubbing, cyanosis, or edema  Neurologic: awake     LAB RESULTS:    12-25    145  |  106  |  27<H>  ----------------------------<  93  3.6   |  28  |  0.75    Ca    8.6      25 Dec 2021 06:18          MICROBIOLOGY:  RECENT CULTURES:  12-23 @ 17:21 Clean Catch Clean Catch (Midstream)     No growth      12-23 @ 15:00 .Stool Feces     GI PCR Results: NOT detected  *******Please Note:*******  GI panel PCR evaluates for:  Campylobacter, Plesiomonas shigelloides, Salmonella,  Vibrio, Yersinia enterocolitica, Enteroaggregative  Escherichia coli (EAEC), Enteropathogenic E.coli (EPEC),  Enterotoxigenic E. coli (ETEC) lt/st, Shiga-like  toxin-producing E. coli (STEC) stx1/stx2,  Shigella/ Enteroinvasive E. coli (EIEC), Cryptosporidium,  Cyclospora cayetanensis, Entamoeba histolytica,  Giardia lamblia, Adenovirus F 40/41, Astrovirus,  Norovirus GI/GII, Rotavirus A, Sapovirus      12-23 @ 00:28 .Blood Blood     No growth to date.          RADIOLOGY REVIEWED:    mri head  MPRESSION: Postop changes are again identified with extra axial   collection seen in the postoperative region. This could be compatible   with pseudomeningocele, though the possibility of abscess or dural leak   cannot entirely excluded. Clinical correlation and continued close   interval is recommended.    Small foci of abnormal restricted diffusion is seen involving the right   centrum semiovale and medial right frontal cortex. These findings are   likely compatible with acute lacunar infarcts.                Assessment:  Patient admitted several weeks ago with cerebellar bleed, soc for decompression then pica aneurysm resection , vps, s/p trach and peg. He had cdif that got better and had tx for pneumonia. A few days ago fever again with diarrhea, cdif relapse now getting better on vanco. Has pseudomeningicoele but not acting infected  Plan:  complete 14 days of qid vanco then will taper over a month  if fever comes back will need consideration of LP or tap of pseudomeningocoele          Assessment:    Plan:

## 2021-12-27 LAB
ANION GAP SERPL CALC-SCNC: 9 MMOL/L — SIGNIFICANT CHANGE UP (ref 5–17)
BUN SERPL-MCNC: 28 MG/DL — HIGH (ref 7–23)
CALCIUM SERPL-MCNC: 8.6 MG/DL — SIGNIFICANT CHANGE UP (ref 8.4–10.5)
CHLORIDE SERPL-SCNC: 105 MMOL/L — SIGNIFICANT CHANGE UP (ref 96–108)
CO2 SERPL-SCNC: 29 MMOL/L — SIGNIFICANT CHANGE UP (ref 22–31)
CREAT SERPL-MCNC: 0.61 MG/DL — SIGNIFICANT CHANGE UP (ref 0.5–1.3)
GLUCOSE SERPL-MCNC: 127 MG/DL — HIGH (ref 70–99)
HCT VFR BLD CALC: 27.6 % — LOW (ref 39–50)
HGB BLD-MCNC: 8.4 G/DL — LOW (ref 13–17)
MCHC RBC-ENTMCNC: 28.9 PG — SIGNIFICANT CHANGE UP (ref 27–34)
MCHC RBC-ENTMCNC: 30.4 GM/DL — LOW (ref 32–36)
MCV RBC AUTO: 94.8 FL — SIGNIFICANT CHANGE UP (ref 80–100)
NRBC # BLD: 0 /100 WBCS — SIGNIFICANT CHANGE UP (ref 0–0)
PLATELET # BLD AUTO: 364 K/UL — SIGNIFICANT CHANGE UP (ref 150–400)
POTASSIUM SERPL-MCNC: 4.3 MMOL/L — SIGNIFICANT CHANGE UP (ref 3.5–5.3)
POTASSIUM SERPL-SCNC: 4.3 MMOL/L — SIGNIFICANT CHANGE UP (ref 3.5–5.3)
RBC # BLD: 2.91 M/UL — LOW (ref 4.2–5.8)
RBC # FLD: 13.2 % — SIGNIFICANT CHANGE UP (ref 10.3–14.5)
SODIUM SERPL-SCNC: 143 MMOL/L — SIGNIFICANT CHANGE UP (ref 135–145)
WBC # BLD: 10.43 K/UL — SIGNIFICANT CHANGE UP (ref 3.8–10.5)
WBC # FLD AUTO: 10.43 K/UL — SIGNIFICANT CHANGE UP (ref 3.8–10.5)

## 2021-12-27 PROCEDURE — 99232 SBSQ HOSP IP/OBS MODERATE 35: CPT

## 2021-12-27 RX ORDER — ASPIRIN/CALCIUM CARB/MAGNESIUM 324 MG
81 TABLET ORAL DAILY
Refills: 0 | Status: DISCONTINUED | OUTPATIENT
Start: 2021-12-28 | End: 2022-01-26

## 2021-12-27 RX ADMIN — Medication 3 MILLILITER(S): at 17:21

## 2021-12-27 RX ADMIN — Medication 2 MILLIGRAM(S): at 05:20

## 2021-12-27 RX ADMIN — Medication 12.5 MILLIGRAM(S): at 05:19

## 2021-12-27 RX ADMIN — SODIUM CHLORIDE 3 MILLILITER(S): 9 INJECTION INTRAMUSCULAR; INTRAVENOUS; SUBCUTANEOUS at 17:21

## 2021-12-27 RX ADMIN — Medication 12.5 MILLIGRAM(S): at 17:29

## 2021-12-27 RX ADMIN — SODIUM CHLORIDE 3 MILLILITER(S): 9 INJECTION INTRAMUSCULAR; INTRAVENOUS; SUBCUTANEOUS at 10:57

## 2021-12-27 RX ADMIN — HUMAN INSULIN 13 UNIT(S): 100 INJECTION, SUSPENSION SUBCUTANEOUS at 23:26

## 2021-12-27 RX ADMIN — CHLORHEXIDINE GLUCONATE 15 MILLILITER(S): 213 SOLUTION TOPICAL at 05:19

## 2021-12-27 RX ADMIN — HUMAN INSULIN 13 UNIT(S): 100 INJECTION, SUSPENSION SUBCUTANEOUS at 05:28

## 2021-12-27 RX ADMIN — Medication 1 DROP(S): at 10:54

## 2021-12-27 RX ADMIN — Medication 1 DROP(S): at 05:20

## 2021-12-27 RX ADMIN — SODIUM CHLORIDE 3 MILLILITER(S): 9 INJECTION INTRAMUSCULAR; INTRAVENOUS; SUBCUTANEOUS at 05:19

## 2021-12-27 RX ADMIN — Medication 3 MILLILITER(S): at 10:55

## 2021-12-27 RX ADMIN — ATORVASTATIN CALCIUM 40 MILLIGRAM(S): 80 TABLET, FILM COATED ORAL at 23:23

## 2021-12-27 RX ADMIN — HUMAN INSULIN 13 UNIT(S): 100 INJECTION, SUSPENSION SUBCUTANEOUS at 10:59

## 2021-12-27 RX ADMIN — Medication 125 MILLIGRAM(S): at 23:23

## 2021-12-27 RX ADMIN — CHLORHEXIDINE GLUCONATE 15 MILLILITER(S): 213 SOLUTION TOPICAL at 17:27

## 2021-12-27 RX ADMIN — Medication 4 MILLILITER(S): at 23:26

## 2021-12-27 RX ADMIN — Medication 1 TABLET(S): at 11:02

## 2021-12-27 RX ADMIN — PANTOPRAZOLE SODIUM 40 MILLIGRAM(S): 20 TABLET, DELAYED RELEASE ORAL at 11:03

## 2021-12-27 RX ADMIN — Medication 4 MILLILITER(S): at 05:19

## 2021-12-27 RX ADMIN — Medication 125 MILLIGRAM(S): at 17:26

## 2021-12-27 RX ADMIN — Medication 1 DROP(S): at 17:20

## 2021-12-27 RX ADMIN — Medication 125 MILLIGRAM(S): at 11:01

## 2021-12-27 RX ADMIN — Medication 3 MILLILITER(S): at 23:24

## 2021-12-27 RX ADMIN — Medication 3 MILLILITER(S): at 05:18

## 2021-12-27 RX ADMIN — NYSTATIN CREAM 1 APPLICATION(S): 100000 CREAM TOPICAL at 05:20

## 2021-12-27 RX ADMIN — Medication 4 MILLILITER(S): at 17:24

## 2021-12-27 RX ADMIN — NYSTATIN CREAM 1 APPLICATION(S): 100000 CREAM TOPICAL at 17:27

## 2021-12-27 RX ADMIN — HUMAN INSULIN 13 UNIT(S): 100 INJECTION, SUSPENSION SUBCUTANEOUS at 17:25

## 2021-12-27 RX ADMIN — SODIUM CHLORIDE 3 MILLILITER(S): 9 INJECTION INTRAMUSCULAR; INTRAVENOUS; SUBCUTANEOUS at 23:24

## 2021-12-27 RX ADMIN — Medication 4 MILLILITER(S): at 11:02

## 2021-12-27 RX ADMIN — Medication 1 DROP(S): at 23:24

## 2021-12-27 RX ADMIN — APIXABAN 5 MILLIGRAM(S): 2.5 TABLET, FILM COATED ORAL at 05:20

## 2021-12-27 RX ADMIN — Medication 125 MILLIGRAM(S): at 05:18

## 2021-12-27 RX ADMIN — APIXABAN 5 MILLIGRAM(S): 2.5 TABLET, FILM COATED ORAL at 17:29

## 2021-12-27 NOTE — PROGRESS NOTE ADULT - SUBJECTIVE AND OBJECTIVE BOX
Chief Complaint:  Patient is a 61y old  Male who presents with a chief complaint of IVH/SAH (27 Dec 2021 11:32)      Date of service 12-27-21 @ 14:11      Interval Events:   Patient seen and examined. Patient laying in bed with eyes open. Rectal tube placed for loose brown stools.     Hospital Medications:  acetaminophen    Suspension .. 650 milliGRAM(s) Oral every 6 hours PRN  acetylcysteine 20%  Inhalation 4 milliLiter(s) Inhalation every 6 hours  albuterol/ipratropium for Nebulization 3 milliLiter(s) Nebulizer every 6 hours  apixaban 5 milliGRAM(s) Oral two times a day  artificial  tears Solution 1 Drop(s) Both EYES every 6 hours  atorvastatin 40 milliGRAM(s) Oral at bedtime  chlorhexidine 0.12% Liquid 15 milliLiter(s) Oral Mucosa two times a day  dextrose 50% Injectable 25 Gram(s) IV Push once  dextrose 50% Injectable 12.5 Gram(s) IV Push once  doxazosin 2 milliGRAM(s) Oral daily  insulin lispro (ADMELOG) corrective regimen sliding scale   SubCutaneous every 6 hours  insulin NPH human recombinant 13 Unit(s) SubCutaneous every 6 hours  metoprolol tartrate 12.5 milliGRAM(s) Oral two times a day  multivitamin 1 Tablet(s) Oral daily  nystatin Powder 1 Application(s) Topical two times a day  pantoprazole  Injectable 40 milliGRAM(s) IV Push daily  sodium chloride 3%  Inhalation 3 milliLiter(s) Inhalation every 6 hours  vancomycin    Solution 125 milliGRAM(s) Oral every 6 hours        Review of Systems:  unable to obtain    PHYSICAL EXAM:   Vital Signs:  Vital Signs Last 24 Hrs  T(C): 37.3 (27 Dec 2021 10:48), Max: 37.7 (26 Dec 2021 15:32)  T(F): 99.1 (27 Dec 2021 10:48), Max: 99.8 (26 Dec 2021 15:32)  HR: 89 (27 Dec 2021 10:48) (88 - 92)  BP: 117/67 (27 Dec 2021 10:48) (106/66 - 134/77)  BP(mean): --  RR: 18 (27 Dec 2021 10:48) (18 - 19)  SpO2: 100% (27 Dec 2021 10:48) (94% - 100%)  Daily     Daily       PHYSICAL EXAM:     GENERAL:  Appears stated age, well-groomed, well-nourished, no distress  HEENT:  NC/AT,  conjunctivae anicteric, clear and pink,   NECK: supple, trachea midline, +trach  CHEST:  Full & symmetric excursion, no increased effort, breath sounds clear  HEART:  Regular rhythm, no JVD  ABDOMEN:  Soft, non-tender, non-distended, normoactive bowel sounds,  no masses , no hepatosplenomegaly, +peg  EXTREMITIES:  no cyanosis,clubbing or edema  SKIN:  No rash, erythema, or, ecchymoses, no jaundice  NEURO:  non-focal, no asterixis  RECTAL: Deferred      LABS Personally reviewed by me:                        8.4    10.43 )-----------( 364      ( 27 Dec 2021 06:11 )             27.6     Mean Cell Volume: 94.8 fl (12-27-21 @ 06:11)    12-27    143  |  105  |  28<H>  ----------------------------<  127<H>  4.3   |  29  |  0.61    Ca    8.6      27 Dec 2021 06:12                                    8.4    10.43 )-----------( 364      ( 27 Dec 2021 06:11 )             27.6       Imaging personally reviewed by me:

## 2021-12-27 NOTE — PROGRESS NOTE ADULT - PROBLEM SELECTOR PLAN 2
completed course of PO vanco previously.  now with recurrent fever and diarrhea.  - rectal tube re-inserted  - infectious work-up as above  - c/w PO vanco started on 12/22 with plan for 14 days of therapy and month long taper  - GI PCR neg, C diff still positive

## 2021-12-27 NOTE — PROGRESS NOTE ADULT - PROBLEM SELECTOR PLAN 5
no s/p trach on trach collar  trach secured per ENT on 12/15  - continue trach care and suctioning  - monitor O2 sats  - continue nebs. change to xopenex if tachycardia persists but overall improved

## 2021-12-27 NOTE — PROGRESS NOTE ADULT - NSPROGADDITIONALINFOA_GEN_ALL_CORE
.  Gaby Gaitan MD  Division of Hospital Medicine  [facility]   Spectra: [phone]    Plan discussed with neurosurgery LUIS Urbina.

## 2021-12-27 NOTE — CHART NOTE - NSCHARTNOTEFT_GEN_A_CORE
Nutrition Follow Up Note  Patient seen for: nutrition follow up    Chart reviewed, events noted. Pt is a 61M with PMH of HTN, CAD s/p stent on DAPT, T2DM who complained of headache and subsequently became unresponsive found to have posterior fossa hemorrhage, IVH, hydrocephalus, s/p EVD and SOC on 11/4. 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 Course c/b respiratory failure and dysphagia s/p trach/PEG (11/19), VPS (11/23), Certas @4 completed course of cefepime for enterobacter and pseudomonas pneumonia on 11/21, fevers, c. diff colitis, urinary retention, b/l distal DVT. Trach changed 12/15 to #7 cuffless due to dislodgement. 12/16 CT A/P wo concern for infectious process. but concern for bladder lesion, wife will pursue possible treatment after rehab. Now with another bout of C.Diff found after fever workup, on PO vanco per ID, blood cultures negative to date. +Rectal tube     Source: [] Patient       [x] EMR        [x] RN        [] Family at bedside           -If unable to interview patient: [x] Trach/Vent/BiPAP  [x] Disoriented/confused/inappropriate to interview    Diet Order: Diet, NPO with Tube Feed:   Tube Feeding Modality: Gastrostomy  Glucerna 1.2 Blake (GLUCERNARTH)  Total Volume for 24 Hours (mL): 1800  Continuous  Until Goal Tube Feed Rate (mL per Hour): 75  Tube Feed Duration (in Hours): 24  Tube Feed Start Time: 12:00  Free Water Flush  Bolus   Total Volume per Flush (mL): 250   Frequency: Every 6 Hours  Charly(7 Gm Arginine/7 Gm Glut/1.2 Gm HMB     Qty per Day:  2  Supplement Feeding Modality:  Gastrostomy  Probiotic Yogurt/Smoothie Cans or Servings Per Day:  2       Frequency:  Daily (12-10-21 @ 13:38)    EN Order Provides: 1800ml, 2160kcal (26kcal/kg) and 108g protein (1.3g protein/kg).     Nutrition-related concerns:   - TF infusing at 75ml/hr at RD visit   - Pt C.Diff positive, +rectal tube. Per GI note 12/26 output improving (200ml).   - Endocrinology following for BG management, pt on NPH 13 units q6H while on continuous TF    Weights:   no updated weights available at this time, will continue to monitor weights for changes if any     MEDICATIONS  (STANDING):  acetylcysteine 20%  Inhalation 4 milliLiter(s) Inhalation every 6 hours  albuterol/ipratropium for Nebulization 3 milliLiter(s) Nebulizer every 6 hours  apixaban 5 milliGRAM(s) Oral two times a day  artificial  tears Solution 1 Drop(s) Both EYES every 6 hours  atorvastatin 40 milliGRAM(s) Oral at bedtime  chlorhexidine 0.12% Liquid 15 milliLiter(s) Oral Mucosa two times a day  dextrose 50% Injectable 25 Gram(s) IV Push once  dextrose 50% Injectable 12.5 Gram(s) IV Push once  doxazosin 2 milliGRAM(s) Oral daily  insulin lispro (ADMELOG) corrective regimen sliding scale   SubCutaneous every 6 hours  insulin NPH human recombinant 13 Unit(s) SubCutaneous every 6 hours  metoprolol tartrate 12.5 milliGRAM(s) Oral two times a day  multivitamin 1 Tablet(s) Oral daily  nystatin Powder 1 Application(s) Topical two times a day  pantoprazole  Injectable 40 milliGRAM(s) IV Push daily  sodium chloride 3%  Inhalation 3 milliLiter(s) Inhalation every 6 hours  vancomycin    Solution 125 milliGRAM(s) Oral every 6 hours    MEDICATIONS  (PRN):  acetaminophen    Suspension .. 650 milliGRAM(s) Oral every 6 hours PRN Temp greater or equal to 38C (100.4F), Mild Pain (1 - 3)    Pertinent Labs: 12-27 @ 06:12: Na 143, BUN 28<H>, Cr 0.61, <H>, K+ 4.3, Phos --, Mg --, Alk Phos --, ALT/SGPT --, AST/SGOT --, HbA1c --    A1C with Estimated Average Glucose Result: 6.5 % (11-04-21 @ 21:43)    Finger Sticks:  CAPILLARY BLOOD GLUCOSE  POCT Blood Glucose.: 128 mg/dL (27 Dec 2021 05:22)  POCT Blood Glucose.: 135 mg/dL (26 Dec 2021 23:00)  POCT Blood Glucose.: 156 mg/dL (26 Dec 2021 17:02)  POCT Blood Glucose.: 189 mg/dL (26 Dec 2021 11:44)    Skin per nursing documentation: surgical incision abdominal LAP, surgical incision s/p crani, suspected DTI sacrum  Edema: 1+ generalized, 3+ right hand    Energy needs:  25- 30 kcal/kg (5857-7226 kcal/kg)  Protein needs:  1.2- 1.4 g/kg (99.6-116.2 g/kg)  Defer fluid needs to medical team  Based on upper .6 pounds (83 kg)    Previous Nutrition Diagnosis: Moderate Malnutrition, Increased Nutrient Needs  Nutrition Diagnosis is: [x] ongoing  [] resolved [] not applicable     New Nutrition Diagnosis: n/a    Nutrition Care Plan:  [x] In Progress  [] Achieved  [] Not applicable       Recommendations:      1. Recommend continue EN feeds of Glucerna 1.2 at 75ml/hr x 24 hrs. To provide (Based on upper IBW 83kg): 1800ml, 2160kcal (26kcal/kg) and 108g protein (1.3g protein/kg). Defer additional free water flushes to team.    -- May consider Banatrol 2 x daily in setting of C.Diff infection.   2. Monitor GI tolerance. RD to remain available to adjust EN formulary, volume/rate PRN.   3. Monitor wt trends/labs/skin integrity/hydration status/bowel regularity.   4. May continue dan active/probiotic smoothie 2x daily 2/2 antibiotics use.   5. Continue multivitamin, consider vitamin C supplementation to support wound healing.    Monitoring and Evaluation:   Continue to monitor nutritional intake, tolerance to diet prescription, weights, labs, skin integrity    RD remains available upon request and will follow up per protocol    Cary Em MS, RD, CDN, [facility] #[phone] Nutrition Follow Up Note  Patient seen for: nutrition follow up    Chart reviewed, events noted. Pt is a 61M with PMH of HTN, CAD s/p stent on DAPT, T2DM who complained of headache and subsequently became unresponsive found to have posterior fossa hemorrhage, IVH, hydrocephalus, s/p EVD and SOC on 11/4. 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 Course c/b respiratory failure and dysphagia s/p trach/PEG (11/19), VPS (11/23), Certas @4 completed course of cefepime for enterobacter and pseudomonas pneumonia on 11/21, fevers, c. diff colitis, urinary retention, b/l distal DVT. Trach changed 12/15 to #7 cuffless due to dislodgement. 12/16 CT A/P wo concern for infectious process. but concern for bladder lesion, wife will pursue possible treatment after rehab. Now with another bout of C.Diff found after fever workup, on PO vanco per ID, blood cultures negative to date. +Rectal tube     Source: [] Patient       [x] EMR        [x] RN        [] Family at bedside           -If unable to interview patient: [x] Trach/Vent/BiPAP  [x] Disoriented/confused/inappropriate to interview    Diet Order: Diet, NPO with Tube Feed:   Tube Feeding Modality: Gastrostomy  Glucerna 1.2 Blake (GLUCERNARTH)  Total Volume for 24 Hours (mL): 1800  Continuous  Until Goal Tube Feed Rate (mL per Hour): 75  Tube Feed Duration (in Hours): 24  Tube Feed Start Time: 12:00  Free Water Flush  Bolus   Total Volume per Flush (mL): 250   Frequency: Every 6 Hours  Charly(7 Gm Arginine/7 Gm Glut/1.2 Gm HMB     Qty per Day:  2  Supplement Feeding Modality:  Gastrostomy  Probiotic Yogurt/Smoothie Cans or Servings Per Day:  2       Frequency:  Daily (12-10-21 @ 13:38)    EN Order Provides: 1800ml, 2160kcal (26kcal/kg) and 108g protein (1.3g protein/kg).     Nutrition-related concerns:   - TF infusing at 75ml/hr at RD visit   - Pt C.Diff positive, +rectal tube. Per GI note 12/26 output improving (200ml).   - Endocrinology following for BG management, pt on NPH 13 units q6H while on continuous TF    Weights:   no updated weights available at this time, will continue to monitor weights for changes if any     MEDICATIONS  (STANDING):  acetylcysteine 20%  Inhalation 4 milliLiter(s) Inhalation every 6 hours  albuterol/ipratropium for Nebulization 3 milliLiter(s) Nebulizer every 6 hours  apixaban 5 milliGRAM(s) Oral two times a day  artificial  tears Solution 1 Drop(s) Both EYES every 6 hours  atorvastatin 40 milliGRAM(s) Oral at bedtime  chlorhexidine 0.12% Liquid 15 milliLiter(s) Oral Mucosa two times a day  dextrose 50% Injectable 25 Gram(s) IV Push once  dextrose 50% Injectable 12.5 Gram(s) IV Push once  doxazosin 2 milliGRAM(s) Oral daily  insulin lispro (ADMELOG) corrective regimen sliding scale   SubCutaneous every 6 hours  insulin NPH human recombinant 13 Unit(s) SubCutaneous every 6 hours  metoprolol tartrate 12.5 milliGRAM(s) Oral two times a day  multivitamin 1 Tablet(s) Oral daily  nystatin Powder 1 Application(s) Topical two times a day  pantoprazole  Injectable 40 milliGRAM(s) IV Push daily  sodium chloride 3%  Inhalation 3 milliLiter(s) Inhalation every 6 hours  vancomycin    Solution 125 milliGRAM(s) Oral every 6 hours    MEDICATIONS  (PRN):  acetaminophen    Suspension .. 650 milliGRAM(s) Oral every 6 hours PRN Temp greater or equal to 38C (100.4F), Mild Pain (1 - 3)    Pertinent Labs: 12-27 @ 06:12: Na 143, BUN 28<H>, Cr 0.61, <H>, K+ 4.3, Phos --, Mg --, Alk Phos --, ALT/SGPT --, AST/SGOT --, HbA1c --    A1C with Estimated Average Glucose Result: 6.5 % (11-04-21 @ 21:43)    Finger Sticks:  CAPILLARY BLOOD GLUCOSE  POCT Blood Glucose.: 128 mg/dL (27 Dec 2021 05:22)  POCT Blood Glucose.: 135 mg/dL (26 Dec 2021 23:00)  POCT Blood Glucose.: 156 mg/dL (26 Dec 2021 17:02)  POCT Blood Glucose.: 189 mg/dL (26 Dec 2021 11:44)    Skin per nursing documentation: surgical incision abdominal LAP, surgical incision s/p crani, suspected DTI sacrum  Edema: 1+ generalized, 3+ right hand    Energy needs:  25- 30 kcal/kg (6779-5474 kcal/kg)  Protein needs:  1.2- 1.4 g/kg (99.6-116.2 g/kg)  Defer fluid needs to medical team  Based on upper .6 pounds (83 kg)    Previous Nutrition Diagnosis: Moderate Malnutrition, Increased Nutrient Needs  Nutrition Diagnosis is: [x] ongoing  [] resolved [] not applicable     New Nutrition Diagnosis: n/a    Nutrition Care Plan:  [x] In Progress  [] Achieved  [] Not applicable       Recommendations:      1. Recommend continue EN feeds of Glucerna 1.2 at 75ml/hr x 24 hrs. To provide (Based on upper IBW 83kg): 1800ml, 2160kcal (26kcal/kg) and 108g protein (1.3g protein/kg). Defer additional free water flushes to team.    -- May consider Banatrol 2 x daily in setting of C.Diff infection.     -- Continue Charly BID to promote wound healing.  2. Monitor GI tolerance. RD to remain available to adjust EN formulary, volume/rate PRN.   3. Monitor wt trends/labs/skin integrity/hydration status/bowel regularity.   4. May continue dan active/probiotic smoothie 2x daily 2/2 antibiotics use.   5. Continue multivitamin, consider vitamin C supplementation to support wound healing.    Monitoring and Evaluation:   Continue to monitor nutritional intake, tolerance to diet prescription, weights, labs, skin integrity    RD remains available upon request and will follow up per protocol    Cary Em MS, RD, CDN, [facility] #[phone]

## 2021-12-27 NOTE — PROGRESS NOTE ADULT - SUBJECTIVE AND OBJECTIVE BOX
[facility] Division of Hospital Medicine  Gaby Gaitan MD  Spectra: [phone]      Patient is a 61y old  Male who presents with a chief complaint of IVH/SAH (26 Dec 2021 12:21)      SUBJECTIVE / OVERNIGHT EVENTS: no acute events overnight. afebrile. unable to obtain ROS as patient non-verbal.  ADDITIONAL REVIEW OF SYSTEMS:    MEDICATIONS  (STANDING):  acetylcysteine 20%  Inhalation 4 milliLiter(s) Inhalation every 6 hours  albuterol/ipratropium for Nebulization 3 milliLiter(s) Nebulizer every 6 hours  apixaban 5 milliGRAM(s) Oral two times a day  artificial  tears Solution 1 Drop(s) Both EYES every 6 hours  atorvastatin 40 milliGRAM(s) Oral at bedtime  chlorhexidine 0.12% Liquid 15 milliLiter(s) Oral Mucosa two times a day  dextrose 50% Injectable 25 Gram(s) IV Push once  dextrose 50% Injectable 12.5 Gram(s) IV Push once  doxazosin 2 milliGRAM(s) Oral daily  insulin lispro (ADMELOG) corrective regimen sliding scale   SubCutaneous every 6 hours  insulin NPH human recombinant 13 Unit(s) SubCutaneous every 6 hours  metoprolol tartrate 12.5 milliGRAM(s) Oral two times a day  multivitamin 1 Tablet(s) Oral daily  nystatin Powder 1 Application(s) Topical two times a day  pantoprazole  Injectable 40 milliGRAM(s) IV Push daily  sodium chloride 3%  Inhalation 3 milliLiter(s) Inhalation every 6 hours  vancomycin    Solution 125 milliGRAM(s) Oral every 6 hours    MEDICATIONS  (PRN):  acetaminophen    Suspension .. 650 milliGRAM(s) Oral every 6 hours PRN Temp greater or equal to 38C (100.4F), Mild Pain (1 - 3)      CAPILLARY BLOOD GLUCOSE      POCT Blood Glucose.: 133 mg/dL (27 Dec 2021 10:48)  POCT Blood Glucose.: 128 mg/dL (27 Dec 2021 05:22)  POCT Blood Glucose.: 135 mg/dL (26 Dec 2021 23:00)  POCT Blood Glucose.: 156 mg/dL (26 Dec 2021 17:02)  POCT Blood Glucose.: 189 mg/dL (26 Dec 2021 11:44)    I&O's Summary    26 Dec 2021 07:01  -  27 Dec 2021 07:00  --------------------------------------------------------  IN: 1550 mL / OUT: 1500 mL / NET: 50 mL        PHYSICAL EXAM:  Vital Signs Last 24 Hrs  T(C): 37.3 (27 Dec 2021 10:48), Max: 37.7 (26 Dec 2021 15:32)  T(F): 99.1 (27 Dec 2021 10:48), Max: 99.8 (26 Dec 2021 15:32)  HR: 89 (27 Dec 2021 10:48) (88 - 99)  BP: 117/67 (27 Dec 2021 10:48) (106/66 - 134/77)  BP(mean): --  RR: 18 (27 Dec 2021 10:48) (18 - 19)  SpO2: 100% (27 Dec 2021 10:48) (94% - 100%)    CONSTITUTIONAL: NAD, well-developed, well-groomed  EYES: PERRLA; conjunctiva and sclera clear  ENMT: Moist oral mucosa, no pharyngeal injection or exudates; normal dentition  NECK: Supple, +trach site c/d/i  RESPIRATORY: Normal respiratory effort; lungs are clear to auscultation bilaterally  CARDIOVASCULAR: Regular rate and rhythm, normal S1 and S2, no murmur/rub/gallop; No lower extremity edema; Peripheral pulses are 2+ bilaterally  ABDOMEN: Soft, Nondistended, Nontender to palpation, normoactive bowel sounds, PEG site c/d/i, +rectal tube  MUSCULOSKELETAL:  No clubbing or cyanosis of digits; no joint swelling or tenderness to palpation  PSYCH: Alert, opens eyes to noxious stimuli, unable to assess orientation as nonverbal  NEUROLOGY: lethargic, not following commands, moving L fingers spontaneously  SKIN: No rashes; no palpable lesions    LABS:                        8.4    10.43 )-----------( 364      ( 27 Dec 2021 06:11 )             27.6     12-27    143  |  105  |  28<H>  ----------------------------<  127<H>  4.3   |  29  |  0.61    Ca    8.6      27 Dec 2021 06:12        RADIOLOGY & ADDITIONAL TESTS:  Results Reviewed: no leukocytosis, H/H stable, Cr stable  Imaging Personally Reviewed:  Electrocardiogram Personally Reviewed:    COORDINATION OF CARE:  Care Discussed with Consultants/Other Providers [Y]: neurosurgery LUIS Urbina  Prior or Outpatient Records Reviewed [Y/N]:

## 2021-12-27 NOTE — PROGRESS NOTE ADULT - ASSESSMENT
61 year old man with respiratory failure d/t ICH    1. ICH  -per neurosurgery,  -s/p  shunt  -for MRI to r/o meningitis    2. Respiratory failure  -for tracheostomy, will require long term enteral nutrition  -s/p PEG, continue TF and use of abdominal binder  - aspiration precautions     3. Enterobacter PNA  -s/p course of antibiotics     4. Anemia, no overt GI bleed. EGD unremarkable.   -hgb stable  -monitor for GI bleeding     5. Cdiff infection   -on Vanco  -150cc in rectal tube drainage bag, improving    6. DVT on a/c  -apixaban 5mg resumed  -continue PPI       Attending supervision statement: I have personally seen and examined the patient. I fully participated in the care of this patient. I have made amendments to the documentation where necessary, and agree with the history, physical exam, and plan as outlined by the ACP.    Attending supervision statement: I have personally seen and examined the patient. I fully participated in the care of this patient. I have made amendments to the documentation where necessary, and agree with the history, physical exam, and plan as outlined by the ACP.        [facility]  Gastroenterology and Hepatology  [address] [place], [place]  Office: [phone]  Cell: [phone]

## 2021-12-27 NOTE — PROGRESS NOTE ADULT - PROBLEM SELECTOR PLAN 11
CT a/p with a 2.4 cm nodular soft tissue density in the bladder appears separate from the prostate gland, concerning for bladder lesion  previous hospitalist d/w urology - findings concerning for bladder tumor vs prostate gland  PSA normal, urine cytology negative for malignancy  previous hospitalist spoke to urology team regarding cysto transurethral resection which requires general anesthesia. Per , recommend to f/u urine cytology result to determine next steps - will need to f/u with urology once acute issues improve.     Previous hospitalist spoke to the patient's wife, Sandrita Bauer [phone]. Given the patient's underlying comorbid conditions coupled with his recent devastating neurologic events, other medical complications that ensued, and his poor neurologic and functional status at this time, it may be prudent to monitor his progress and see if he makes any meaningful recovery over the coming weeks to months to decide whether the benefit of pursuing any invasive work up for the bladder lesion outweighs the risk. Spouse seems to be in agreement at the moment.

## 2021-12-27 NOTE — PROGRESS NOTE ADULT - PROBLEM SELECTOR PLAN 1
new recurrent fever on 12/22, 12/23 with worsening diarrhea now resolved.  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

## 2021-12-27 NOTE — PROGRESS NOTE ADULT - SUBJECTIVE AND OBJECTIVE BOX
SUBJECTIVE: Pt seen and examined, resting comfortably in bed,     OVERNIGHT EVENTS: none    Vital Signs Last 24 Hrs  T(C): 37.3 (27 Dec 2021 10:48), Max: 37.7 (26 Dec 2021 15:32)  T(F): 99.1 (27 Dec 2021 10:48), Max: 99.8 (26 Dec 2021 15:32)  HR: 89 (27 Dec 2021 10:48) (88 - 99)  BP: 117/67 (27 Dec 2021 10:48) (106/66 - 134/77)  BP(mean): --  RR: 18 (27 Dec 2021 10:48) (18 - 19)  SpO2: 100% (27 Dec 2021 10:48) (94% - 100%)    PHYSICAL EXAM:    General: No Acute Distress     Neurological: Awake, alert oriented to person, place and time, Following Commands, PERRL, EOMI, Face Symmetrical, Speech Fluent, Moving all extremities, Muscle Strength normal in all four extremities, No Drift, Sensation to Light Touch Intact    Pulmonary: Clear to Auscultation, No Rales, No Rhonchi, No Wheezes     Cardiovascular: S1, S2, Regular Rate and Rhythm     Gastrointestinal: Soft, Nontender, Nondistended     Incision:     LABS:                        8.4    10.43 )-----------( 364      ( 27 Dec 2021 06:11 )             27.6    12-27    143  |  105  |  28<H>  ----------------------------<  127<H>  4.3   |  29  |  0.61    Ca    8.6      27 Dec 2021 06:12          12-26 @ 07:01  -  12-27 @ 07:00  --------------------------------------------------------  IN: 1550 mL / OUT: 1500 mL / NET: 50 mL      DRAINS:     MEDICATIONS:  Antibiotics:  vancomycin    Solution 125 milliGRAM(s) Oral every 6 hours    Neuro:  acetaminophen    Suspension .. 650 milliGRAM(s) Oral every 6 hours PRN Temp greater or equal to 38C (100.4F), Mild Pain (1 - 3)    Cardiac:  doxazosin 2 milliGRAM(s) Oral daily  metoprolol tartrate 12.5 milliGRAM(s) Oral two times a day    Pulm:  acetylcysteine 20%  Inhalation 4 milliLiter(s) Inhalation every 6 hours  albuterol/ipratropium for Nebulization 3 milliLiter(s) Nebulizer every 6 hours  sodium chloride 3%  Inhalation 3 milliLiter(s) Inhalation every 6 hours    GI/:  pantoprazole  Injectable 40 milliGRAM(s) IV Push daily    Other:   apixaban 5 milliGRAM(s) Oral two times a day  artificial  tears Solution 1 Drop(s) Both EYES every 6 hours  atorvastatin 40 milliGRAM(s) Oral at bedtime  chlorhexidine 0.12% Liquid 15 milliLiter(s) Oral Mucosa two times a day  dextrose 50% Injectable 25 Gram(s) IV Push once  dextrose 50% Injectable 12.5 Gram(s) IV Push once  insulin lispro (ADMELOG) corrective regimen sliding scale   SubCutaneous every 6 hours  insulin NPH human recombinant 13 Unit(s) SubCutaneous every 6 hours  multivitamin 1 Tablet(s) Oral daily  nystatin Powder 1 Application(s) Topical two times a day    DIET: [] Regular [] CCD [] Renal [] Puree [] Dysphagia [] Tube Feeds:     IMAGING:    SUBJECTIVE: Pt seen and examined, resting comfortably in bed,     OVERNIGHT EVENTS: none    Vital Signs Last 24 Hrs  T(C): 37.3 (27 Dec 2021 10:48), Max: 37.7 (26 Dec 2021 15:32)  T(F): 99.1 (27 Dec 2021 10:48), Max: 99.8 (26 Dec 2021 15:32)  HR: 89 (27 Dec 2021 10:48) (88 - 99)  BP: 117/67 (27 Dec 2021 10:48) (106/66 - 134/77)  BP(mean): --  RR: 18 (27 Dec 2021 10:48) (18 - 19)  SpO2: 100% (27 Dec 2021 10:48) (94% - 100%)    PHYSICAL EXAM:    General: No Acute Distress     Neurological: Awake, eyes open with downward gaze,  trach collar, intermittently FC (squeezes hand), UE 0/5, b/l LE wds    Pulmonary: Clear to Auscultation, No Rales, No Rhonchi, No Wheezes     Cardiovascular: S1, S2, Regular Rate and Rhythm     Gastrointestinal: Soft, Nontender, Nondistended, + rectal tube, PEG    Incision: healed incision    LABS:                        8.4    10.43 )-----------( 364      ( 27 Dec 2021 06:11 )             27.6    12-27    143  |  105  |  28<H>  ----------------------------<  127<H>  4.3   |  29  |  0.61    Ca    8.6      27 Dec 2021 06:12          12-26 @ 07:01  -  12-27 @ 07:00  --------------------------------------------------------  IN: 1550 mL / OUT: 1500 mL / NET: 50 mL        MEDICATIONS:  Antibiotics:  vancomycin    Solution 125 milliGRAM(s) Oral every 6 hours    Neuro:  acetaminophen    Suspension .. 650 milliGRAM(s) Oral every 6 hours PRN Temp greater or equal to 38C (100.4F), Mild Pain (1 - 3)    Cardiac:  doxazosin 2 milliGRAM(s) Oral daily  metoprolol tartrate 12.5 milliGRAM(s) Oral two times a day    Pulm:  acetylcysteine 20%  Inhalation 4 milliLiter(s) Inhalation every 6 hours  albuterol/ipratropium for Nebulization 3 milliLiter(s) Nebulizer every 6 hours  sodium chloride 3%  Inhalation 3 milliLiter(s) Inhalation every 6 hours    GI/:  pantoprazole  Injectable 40 milliGRAM(s) IV Push daily    Other:   apixaban 5 milliGRAM(s) Oral two times a day  artificial  tears Solution 1 Drop(s) Both EYES every 6 hours  atorvastatin 40 milliGRAM(s) Oral at bedtime  chlorhexidine 0.12% Liquid 15 milliLiter(s) Oral Mucosa two times a day  dextrose 50% Injectable 25 Gram(s) IV Push once  dextrose 50% Injectable 12.5 Gram(s) IV Push once  insulin lispro (ADMELOG) corrective regimen sliding scale   SubCutaneous every 6 hours  insulin NPH human recombinant 13 Unit(s) SubCutaneous every 6 hours  multivitamin 1 Tablet(s) Oral daily  nystatin Powder 1 Application(s) Topical two times a day    DIET: [] Regular [] CCD [] Renal [] Puree [] Dysphagia [x] Tube Feeds:     IMAGING:   < from: CT Abdomen and Pelvis w/ IV Cont (12.16.21 @ 09:26) >    IMPRESSION:  A 2.4 cm nodular soft tissue density in the bladder appears separate from   the prostate gland, concerning for bladder lesion. Correlate with   cystoscopy.    A 2.7 cm fluid collection adjacent to the right ischial tuberosity, may   be related to ischiogluteal bursitis.    Indeterminant 1.7 cm left renal lesion. Contrast-enhanced MRI can be   performed further evaluation.    Mild fatty infiltration along the PEG tube catheter without drainable   fluid collection.    < end of copied text >  < from: CT Head No Cont (12.12.21 @ 15:38) >  IMPRESSION:    Similar ventricular size when compared to 12/9/2021.    < end of copied text >  < from: CT Head No Cont (12.07.21 @ 11:42) >  IMPRESSION: Left suboccipital craniectomy and cerebellar postoperative changes. Right parietal  shunt catheter. Moderate enlargement of the ventricles compared with 11/26/2021..    < end of copied text >  < from: MR Head w/wo IV Cont (12.23.21 @ 23:50) >  IMPRESSION: Postop changes are again identified with extra axial   collection seen in the postoperative region. This could be compatible   with pseudomeningocele, though the possibility of abscess or dural leak   cannot entirely excluded. Clinical correlation and continued close   interval is recommended.    Small foci of abnormal restricted diffusion is seen involving the right   centrum semiovale and medial right frontal cortex. These findings are   likely compatible with acute lacunar infarcts.    --- End of Report ---    < end of copied text >  < from: [facility] Duplex Lower Ext Vein Scan, Bilat (12.08.21 @ 12:21) >    IMPRESSION:    Persistent bilateral below the knees deep venous thrombosis in the   bilateral calf veins. No propagation.    < end of copied text >

## 2021-12-27 NOTE — PROGRESS NOTE ADULT - ASSESSMENT
61M with PMH of HTN, CAD s/p stent on DAPT, T2DM who complained of headache and subsequently became unresponsive found to have posterior fossa hemorrhage, IVH, hydrocephalus, s/p EVD and SOC on 11/4. 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 Course c/b respiratory failure and dysphagia s/p trach/PEG (11/19), VPS (11/23),Certas @4 completed course of cefepime for enterobacter and pseudomonas pneumonia on 11/21, fevers, c. diff colitis, urinary retention, b/l distal DVT. Trach changed 12/15 to #7 cuffless due to dislodgement. 12/16 CT A/P wo concern for infectious process. but concern for bladder lesion, wife will pursue possible treatment after rehab. Now with another bout of cdiff found after fever workup, on PO vanco per ID, blood cultures negative to date. +Rectal tube     PLAN:  Neuro:   - shunt  Certas @4  - PICA aneurysm resection  - pt is DNR  Respiratory:   - continue duoneb and mucomyst  - trach care, suction prn  - PE/DVT- on apixaban  CV:  - acute post op blood loss anemia improved after transfusion PRBC 12/22  - HTN- continue metoprolol  - on doxazosin for urinary retention  Endocrine:   - DMT2- continue fingersticks with NPH 13u q6  DVT ppx:   - on eliquis for dvt  Renal:   - hypernatremia- Na 143, continue free water 200q6  - MICHELLE has resolved  -Urinary retention- requiring straight cath q6  - Urology following for renal lesion, PSA/Cytology pending, GOC necessary for any urologic intervention  - f/u urine cytology is negative for high grade urothelial carcinoma  ID:   - recurrent fever and diarrhea- cdiff again positive, PO vanco restarted (completed course earlier in stay)- ID recs appreciated  - MRI as above  - follow up CT scan of C/A/P to look for occult source of infection, found to have 2.4 cm nodular soft tissue density, concerning for bladder lesion, 1.7 cm left renal lesion  - CTX for pulmonary coverage if he deteriorates per ID  GI:    - tolerating tube feeds via PEG  - rectal tube in place  PT/OT:   - CHUYITA    Per Hospitalist note, it may be feasible at this time to monitor patient's progress for meaningful recovery over weeks, months and then decide whether there is benefit of pursuing workup of bladder lesion. Spouse in agreement.    Spectralink # [id number]

## 2021-12-27 NOTE — PROGRESS NOTE ADULT - SUBJECTIVE AND OBJECTIVE BOX
CC: f/u for cdif    Patient reports nothing not verbal    REVIEW OF SYSTEMS:  All other review of systems negative (Comprehensive ROS)cannot get    Antimicrobials Day #  :  vancomycin    Solution 125 milliGRAM(s) Oral every 6 hours    Other Medications Reviewed    T(F): 99.1 (12-27-21 @ 10:48), Max: 99.1 (12-27-21 @ 07:00)  HR: 89 (12-27-21 @ 10:48)  BP: 117/67 (12-27-21 @ 10:48)  RR: 18 (12-27-21 @ 10:48)  SpO2: 100% (12-27-21 @ 10:48)  Wt(kg): --    PHYSICAL EXAM:  General: alert, no acute distress  Eyes:  anicteric, no conjunctival injection, no discharge  Oropharynx: no lesions or injection 	  Neck: supple, without adenopathy  Lungs: clear to auscultation  Heart: regular rate and rhythm; no murmur, rubs or gallops  Abdomen: soft, nondistended, nontender, without mass or organomegaly  Skin: no lesions  Extremities: no clubbing, cyanosis, or edema  Neurologic: alert, oriented, moves all extremities    LAB RESULTS:                        8.4    10.43 )-----------( 364      ( 27 Dec 2021 06:11 )             27.6     12-27    143  |  105  |  28<H>  ----------------------------<  127<H>  4.3   |  29  |  0.61    Ca    8.6      27 Dec 2021 06:12          MICROBIOLOGY:  RECENT CULTURES:  12-23 @ 17:21 Clean Catch Clean Catch (Midstream)     No growth      12-23 @ 15:00 .Stool Feces     GI PCR Results: NOT detected  *******Please Note:*******  GI panel PCR evaluates for:  Campylobacter, Plesiomonas shigelloides, Salmonella,  Vibrio, Yersinia enterocolitica, Enteroaggregative  Escherichia coli (EAEC), Enteropathogenic E.coli (EPEC),  Enterotoxigenic E. coli (ETEC) lt/st, Shiga-like  toxin-producing E. coli (STEC) stx1/stx2,  Shigella/ Enteroinvasive E. coli (EIEC), Cryptosporidium,  Cyclospora cayetanensis, Entamoeba histolytica,  Giardia lamblia, Adenovirus F 40/41, Astrovirus,  Norovirus GI/GII, Rotavirus A, Sapovirus      12-23 @ 00:28 .Blood Blood     No growth to date.          RADIOLOGY REVIEWED:              Assessment:  Patient admitted several weeks ago with cerebellar bleed, soc for decompression then pica aneurysm resection , vps, s/p trach and peg. He had cdif that got better and had tx for pneumonia. Last week he developed  fever again with diarrhea, cdif relapse now getting better on vanco. Has pseudomeningicoele but not acting infected  Plan:  complete 8 more days of qid vanco then will taper over a month, tid, bid, qd, qod  if fever comes back will need consideration of LP or tap of pseudomeningocoele    Plan:

## 2021-12-28 PROCEDURE — 99232 SBSQ HOSP IP/OBS MODERATE 35: CPT

## 2021-12-28 RX ADMIN — APIXABAN 5 MILLIGRAM(S): 2.5 TABLET, FILM COATED ORAL at 17:30

## 2021-12-28 RX ADMIN — Medication 3 MILLILITER(S): at 23:06

## 2021-12-28 RX ADMIN — Medication 1 DROP(S): at 06:11

## 2021-12-28 RX ADMIN — Medication 1 TABLET(S): at 12:26

## 2021-12-28 RX ADMIN — NYSTATIN CREAM 1 APPLICATION(S): 100000 CREAM TOPICAL at 06:12

## 2021-12-28 RX ADMIN — Medication 2 MILLIGRAM(S): at 06:13

## 2021-12-28 RX ADMIN — Medication 3 MILLILITER(S): at 06:12

## 2021-12-28 RX ADMIN — Medication 1 DROP(S): at 12:29

## 2021-12-28 RX ADMIN — ATORVASTATIN CALCIUM 40 MILLIGRAM(S): 80 TABLET, FILM COATED ORAL at 22:07

## 2021-12-28 RX ADMIN — PANTOPRAZOLE SODIUM 40 MILLIGRAM(S): 20 TABLET, DELAYED RELEASE ORAL at 12:26

## 2021-12-28 RX ADMIN — Medication 3 MILLILITER(S): at 17:30

## 2021-12-28 RX ADMIN — HUMAN INSULIN 13 UNIT(S): 100 INJECTION, SUSPENSION SUBCUTANEOUS at 12:26

## 2021-12-28 RX ADMIN — Medication 125 MILLIGRAM(S): at 17:29

## 2021-12-28 RX ADMIN — APIXABAN 5 MILLIGRAM(S): 2.5 TABLET, FILM COATED ORAL at 06:13

## 2021-12-28 RX ADMIN — Medication 125 MILLIGRAM(S): at 12:27

## 2021-12-28 RX ADMIN — Medication 4 MILLILITER(S): at 17:30

## 2021-12-28 RX ADMIN — SODIUM CHLORIDE 3 MILLILITER(S): 9 INJECTION INTRAMUSCULAR; INTRAVENOUS; SUBCUTANEOUS at 17:30

## 2021-12-28 RX ADMIN — Medication 650 MILLIGRAM(S): at 22:07

## 2021-12-28 RX ADMIN — CHLORHEXIDINE GLUCONATE 15 MILLILITER(S): 213 SOLUTION TOPICAL at 06:13

## 2021-12-28 RX ADMIN — Medication 4 MILLILITER(S): at 12:28

## 2021-12-28 RX ADMIN — Medication 3 MILLILITER(S): at 12:28

## 2021-12-28 RX ADMIN — Medication 1 DROP(S): at 17:30

## 2021-12-28 RX ADMIN — Medication 125 MILLIGRAM(S): at 06:13

## 2021-12-28 RX ADMIN — HUMAN INSULIN 13 UNIT(S): 100 INJECTION, SUSPENSION SUBCUTANEOUS at 06:14

## 2021-12-28 RX ADMIN — SODIUM CHLORIDE 3 MILLILITER(S): 9 INJECTION INTRAMUSCULAR; INTRAVENOUS; SUBCUTANEOUS at 06:12

## 2021-12-28 RX ADMIN — Medication 12.5 MILLIGRAM(S): at 06:14

## 2021-12-28 RX ADMIN — HUMAN INSULIN 13 UNIT(S): 100 INJECTION, SUSPENSION SUBCUTANEOUS at 17:29

## 2021-12-28 RX ADMIN — Medication 12.5 MILLIGRAM(S): at 17:30

## 2021-12-28 RX ADMIN — Medication 81 MILLIGRAM(S): at 12:26

## 2021-12-28 RX ADMIN — CHLORHEXIDINE GLUCONATE 15 MILLILITER(S): 213 SOLUTION TOPICAL at 17:30

## 2021-12-28 RX ADMIN — Medication 650 MILLIGRAM(S): at 22:37

## 2021-12-28 RX ADMIN — Medication 2: at 12:27

## 2021-12-28 RX ADMIN — Medication 4 MILLILITER(S): at 07:03

## 2021-12-28 NOTE — PROGRESS NOTE ADULT - NSPROGADDITIONALINFOA_GEN_ALL_CORE
.  Gaby Gaitan MD  Division of Hospital Medicine  [facility]   Spectra: [phone]    Plan discussed with neurosurgery LUIS Urbina.

## 2021-12-28 NOTE — PROGRESS NOTE ADULT - SUBJECTIVE AND OBJECTIVE BOX
[facility]-- WOUND TEAM -- FOLLOW UP NOTE  --------------------------------------------------------------------------------    24 hour events/subjective:    tolerating TF  incontinent  (+)FMS  afebrile      Diet:  Diet, NPO with Tube Feed:   Tube Feeding Modality: Gastrostomy  Glucerna 1.2 Blake (GLUCERNAR)  Total Volume for 24 Hours (mL): 1800  Continuous  Until Goal Tube Feed Rate (mL per Hour): 75  Tube Feed Duration (in Hours): 24  Tube Feed Start Time: 12:00  Free Water Flush  Bolus   Total Volume per Flush (mL): 250   Frequency: Every 6 Hours  Charly(7 Gm Arginine/7 Gm Glut/1.2 Gm HMB     Qty per Day:  2  Supplement Feeding Modality:  Gastrostomy  Probiotic Yogurt/Smoothie Cans or Servings Per Day:  2       Frequency:  Daily (12-10-21 @ 13:38)      ROS: pt unable to offer    ALLERGIES & MEDICATIONS  --------------------------------------------------------------------------------  Allergies  penicillin (Other)      STANDING INPATIENT MEDICATIONS  acetylcysteine 20%  Inhalation 4 milliLiter(s) Inhalation every 6 hours  albuterol/ipratropium for Nebulization 3 milliLiter(s) Nebulizer every 6 hours  apixaban 5 milliGRAM(s) Oral two times a day  artificial  tears Solution 1 Drop(s) Both EYES every 6 hours  aspirin  chewable 81 milliGRAM(s) Oral daily  atorvastatin 40 milliGRAM(s) Oral at bedtime  chlorhexidine 0.12% Liquid 15 milliLiter(s) Oral Mucosa two times a day  dextrose 50% Injectable 25 Gram(s) IV Push once  dextrose 50% Injectable 12.5 Gram(s) IV Push once  doxazosin 2 milliGRAM(s) Oral daily  insulin lispro (ADMELOG) corrective regimen sliding scale   SubCutaneous every 6 hours  insulin NPH human recombinant 13 Unit(s) SubCutaneous every 6 hours  metoprolol tartrate 12.5 milliGRAM(s) Oral two times a day  multivitamin 1 Tablet(s) Oral daily  nystatin Powder 1 Application(s) Topical two times a day  pantoprazole  Injectable 40 milliGRAM(s) IV Push daily  sodium chloride 3%  Inhalation 3 milliLiter(s) Inhalation every 6 hours  vancomycin    Solution 125 milliGRAM(s) Oral every 6 hours      PRN INPATIENT MEDICATION  acetaminophen    Suspension .. 650 milliGRAM(s) Oral every 6 hours PRN        VITALS/PHYSICAL EXAM  --------------------------------------------------------------------------------  T(C): 36.6 (12-28-21 @ 12:13), Max: 37.3 (12-27-21 @ 15:00)  HR: 88 (12-28-21 @ 12:13) (83 - 93)  BP: 136/77 (12-28-21 @ 12:13) (117/73 - 136/77)  RR: 18 (12-28-21 @ 12:13) (18 - 20)  SpO2: 99% (12-28-21 @ 12:13) (99% - 100%)  Wt(kg): --        12-27-21 @ 07:01  -  12-28-21 @ 07:00  --------------------------------------------------------  IN: 2720 mL / OUT: 3250 mL / NET: -530 mL      Physical Exam:  General: Obtunded  HEENT: NC/AT, PERRLA, mucosa moist, trach collar, neck supple  Gastrointestinal: soft NT/ND (+)PEG   Neurology: nonverbal, not follow commands  Musculoskeletal: no contractures  Vascular: BLE edema equal, equally warm  Skin: moist w/ good turogr   Sacral/bilateral buttocks 6cm x 4cm x 0.1cm w/ denuded skin right buttock & fading central deep maroon discoloration      scant serous drainage,      periwound skin is intact w/o blistering - no longer macerated and erythematous.     Bilateral groins intact pale erythema w/o drainage or blistering  No odor, increased warmth, tenderness, induration, fluctuance      LABS/ CULTURES/ RADIOLOGY:              8.4    10.43 >-----------<  364      [12-27-21 @ 06:11]              27.6     143  |  105  |  28  ----------------------------<  127      [12-27-21 @ 06:12]  4.3   |  29  |  0.61        Ca     8.6     [12-27-21 @ 06:12]      A1C with Estimated Average Glucose Result: 6.5 % (11-04-21 @ 21:43)    CAPILLARY BLOOD GLUCOSE  POCT Blood Glucose.: 155 mg/dL (28 Dec 2021 12:09)  POCT Blood Glucose.: 150 mg/dL (28 Dec 2021 06:09)  POCT Blood Glucose.: 141 mg/dL (27 Dec 2021 23:05)  POCT Blood Glucose.: 145 mg/dL (27 Dec 2021 17:09)      Culture - Blood (collected 12-23-21 @ 00:28)  Source: .Blood Blood  Final Report (12-28-21 @ 01:01):    No Growth Final    Culture - Blood (collected 12-23-21 @ 00:28)  Source: .Blood Blood  Final Report (12-28-21 @ 01:01):    No Growth Final     [facility]-- WOUND TEAM -- FOLLOW UP NOTE  --------------------------------------------------------------------------------    24 hour events/subjective:    tolerating TF  incontinent  (+)FMS  afebrile      Diet:  Diet, NPO with Tube Feed:   Tube Feeding Modality: Gastrostomy  Glucerna 1.2 Blake (GLUCERNAR)  Total Volume for 24 Hours (mL): 1800  Continuous  Until Goal Tube Feed Rate (mL per Hour): 75  Tube Feed Duration (in Hours): 24  Tube Feed Start Time: 12:00  Free Water Flush  Bolus   Total Volume per Flush (mL): 250   Frequency: Every 6 Hours  Charly(7 Gm Arginine/7 Gm Glut/1.2 Gm HMB     Qty per Day:  2  Supplement Feeding Modality:  Gastrostomy  Probiotic Yogurt/Smoothie Cans or Servings Per Day:  2       Frequency:  Daily (12-10-21 @ 13:38)      ROS: pt unable to offer    ALLERGIES & MEDICATIONS  --------------------------------------------------------------------------------  Allergies  penicillin (Other)      STANDING INPATIENT MEDICATIONS  acetylcysteine 20%  Inhalation 4 milliLiter(s) Inhalation every 6 hours  albuterol/ipratropium for Nebulization 3 milliLiter(s) Nebulizer every 6 hours  apixaban 5 milliGRAM(s) Oral two times a day  artificial  tears Solution 1 Drop(s) Both EYES every 6 hours  aspirin  chewable 81 milliGRAM(s) Oral daily  atorvastatin 40 milliGRAM(s) Oral at bedtime  chlorhexidine 0.12% Liquid 15 milliLiter(s) Oral Mucosa two times a day  dextrose 50% Injectable 25 Gram(s) IV Push once  dextrose 50% Injectable 12.5 Gram(s) IV Push once  doxazosin 2 milliGRAM(s) Oral daily  insulin lispro (ADMELOG) corrective regimen sliding scale   SubCutaneous every 6 hours  insulin NPH human recombinant 13 Unit(s) SubCutaneous every 6 hours  metoprolol tartrate 12.5 milliGRAM(s) Oral two times a day  multivitamin 1 Tablet(s) Oral daily  nystatin Powder 1 Application(s) Topical two times a day  pantoprazole  Injectable 40 milliGRAM(s) IV Push daily  sodium chloride 3%  Inhalation 3 milliLiter(s) Inhalation every 6 hours  vancomycin    Solution 125 milliGRAM(s) Oral every 6 hours      PRN INPATIENT MEDICATION  acetaminophen    Suspension .. 650 milliGRAM(s) Oral every 6 hours PRN        VITALS/PHYSICAL EXAM  --------------------------------------------------------------------------------  T(C): 36.6 (12-28-21 @ 12:13), Max: 37.3 (12-27-21 @ 15:00)  HR: 88 (12-28-21 @ 12:13) (83 - 93)  BP: 136/77 (12-28-21 @ 12:13) (117/73 - 136/77)  RR: 18 (12-28-21 @ 12:13) (18 - 20)  SpO2: 99% (12-28-21 @ 12:13) (99% - 100%)  Wt(kg): --        12-27-21 @ 07:01  -  12-28-21 @ 07:00  --------------------------------------------------------  IN: 2720 mL / OUT: 3250 mL / NET: -530 mL      Physical Exam:  General: Obtunded  HEENT: NC/AT, PERRLA, mucosa moist, trach collar, neck supple  Gastrointestinal: soft NT/ND (+)PEG  (+)FMS  Neurology: nonverbal, not follow commands  Musculoskeletal: no contractures  Vascular: BLE edema equal, equally warm  Skin: moist w/ good turogr   Sacral/bilateral buttocks 6cm x 4cm x 0.1cm w/ denuded skin right buttock & fading central deep maroon discoloration      scant serous drainage,      periwound skin is intact w/o blistering - no longer macerated and erythematous.     Bilateral groins intact pale erythema w/o drainage or blistering  No odor, increased warmth, tenderness, induration, fluctuance      LABS/ CULTURES/ RADIOLOGY:              8.4    10.43 >-----------<  364      [12-27-21 @ 06:11]              27.6     143  |  105  |  28  ----------------------------<  127      [12-27-21 @ 06:12]  4.3   |  29  |  0.61        Ca     8.6     [12-27-21 @ 06:12]      A1C with Estimated Average Glucose Result: 6.5 % (11-04-21 @ 21:43)    CAPILLARY BLOOD GLUCOSE  POCT Blood Glucose.: 155 mg/dL (28 Dec 2021 12:09)  POCT Blood Glucose.: 150 mg/dL (28 Dec 2021 06:09)  POCT Blood Glucose.: 141 mg/dL (27 Dec 2021 23:05)  POCT Blood Glucose.: 145 mg/dL (27 Dec 2021 17:09)      Culture - Blood (collected 12-23-21 @ 00:28)  Source: .Blood Blood  Final Report (12-28-21 @ 01:01):    No Growth Final    Culture - Blood (collected 12-23-21 @ 00:28)  Source: .Blood Blood  Final Report (12-28-21 @ 01:01):    No Growth Final

## 2021-12-28 NOTE — PROGRESS NOTE ADULT - SUBJECTIVE AND OBJECTIVE BOX
CC: f/u for C Diff    Patient reports: he appears non verbal, minimal fecal output    REVIEW OF SYSTEMS:  All other review of systems negative (Comprehensive ROS)    Antimicrobials Day #  :day 7/14   vancomycin    Solution 125 milliGRAM(s) Oral every 6 hours    Other Medications Reviewed  MEDICATIONS  (STANDING):  acetylcysteine 20%  Inhalation 4 milliLiter(s) Inhalation every 6 hours  albuterol/ipratropium for Nebulization 3 milliLiter(s) Nebulizer every 6 hours  apixaban 5 milliGRAM(s) Oral two times a day  artificial  tears Solution 1 Drop(s) Both EYES every 6 hours  aspirin  chewable 81 milliGRAM(s) Oral daily  atorvastatin 40 milliGRAM(s) Oral at bedtime  chlorhexidine 0.12% Liquid 15 milliLiter(s) Oral Mucosa two times a day  dextrose 50% Injectable 25 Gram(s) IV Push once  dextrose 50% Injectable 12.5 Gram(s) IV Push once  doxazosin 2 milliGRAM(s) Oral daily  insulin lispro (ADMELOG) corrective regimen sliding scale   SubCutaneous every 6 hours  insulin NPH human recombinant 13 Unit(s) SubCutaneous every 6 hours  metoprolol tartrate 12.5 milliGRAM(s) Oral two times a day  multivitamin 1 Tablet(s) Oral daily  nystatin Powder 1 Application(s) Topical two times a day  pantoprazole  Injectable 40 milliGRAM(s) IV Push daily  sodium chloride 3%  Inhalation 3 milliLiter(s) Inhalation every 6 hours  vancomycin    Solution 125 milliGRAM(s) Oral every 6 hours    T(F): 97.8 (12-28-21 @ 12:13), Max: 99.1 (12-27-21 @ 15:00)  HR: 88 (12-28-21 @ 12:13)  BP: 136/77 (12-28-21 @ 12:13)  RR: 18 (12-28-21 @ 12:13)  SpO2: 99% (12-28-21 @ 12:13)  Wt(kg): --    PHYSICAL EXAM:  General: alert, no acute distress, poorly interactive  Eyes:  anicteric, no conjunctival injection, no discharge  Oropharynx: no lesions or injection 	  Neck: supple, without adenopathy  Lungs: clear to auscultation  Heart: regular rate and rhythm; no murmur, rubs or gallops  Abdomen: soft, nondistended, nontender, peg  Skin: no lesions  Extremities: no clubbing, cyanosis, or edema  Neurologic: poorly interactive    LAB RESULTS:                        8.4    10.43 )-----------( 364      ( 27 Dec 2021 06:11 )             27.6     12-27    143  |  105  |  28<H>  ----------------------------<  127<H>  4.3   |  29  |  0.61    Ca    8.6      27 Dec 2021 06:12          MICROBIOLOGY:  RECENT CULTURES:  12-23 @ 17:21 Clean Catch Clean Catch (Midstream)     No growth      12-23 @ 15:00 .Stool Feces     GI PCR Results: NOT detected  *******Please Note:*******            RADIOLOGY REVIEWED:    < from: MR Head w/wo IV Cont (12.23.21 @ 23:50) >  IMPRESSION: Postop changes are again identified with extra axial   collection seen in the postoperative region. This could be compatible   with pseudomeningocele, though the possibility of abscess or dural leak   cannot entirely excluded. Clinical correlation and continued close   interval is recommended.    Small foci of abnormal restricted diffusion is seen involving the right   centrum semiovale and medial right frontal cortex. These findings are   likely compatible with acute lacunar infarcts.    < end of copied text >

## 2021-12-28 NOTE — PROGRESS NOTE ADULT - ASSESSMENT
61M with PMH of HTN, CAD s/p stent on DAPT, T2DM who complained of headache and subsequently became unresponsive found to have posterior fossa hemorrhage, IVH, hydrocephalus, s/p EVD and SOC on 11/4. 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 Course c/b respiratory failure and dysphagia s/p trach/PEG (11/19), VPS (11/23),Certas @4 completed course of cefepime for enterobacter and pseudomonas pneumonia on 11/21, fevers, c. diff colitis, urinary retention, b/l distal DVT. Trach changed 12/15 to #7 cuffless due to dislodgement. 12/16 CT A/P wo concern for infectious process. but concern for bladder lesion, wife will pursue possible treatment after rehab. Now with another bout of cdiff found after fever workup, on PO vanco per ID, blood cultures negative to date. +Rectal tube     PLAN:  Neuro:   - shunt  Certas @4  - PICA aneurysm resection  - pt is DNR  Respiratory:   - continue duoneb and mucomyst  - trach care, suction prn  - PE/DVT- on apixaban  CV:  - acute post op blood loss anemia improved after transfusion PRBC 12/22  - HTN- continue metoprolol  - on doxazosin for urinary retention  Endocrine:   - DMT2- continue fingersticks with NPH 13u q6  DVT ppx:   - on eliquis for dvt  Renal:   - hypernatremia- Na 143, continue free water 200q6  - MICHELLE has resolved  -Urinary retention- requiring straight cath q6  - Urology following for renal lesion, PSA/Cytology pending, GOC necessary for any urologic intervention  - f/u urine cytology is negative for high grade urothelial carcinoma  ID:   - recurrent fever and diarrhea- cdiff again positive, PO vanco restarted (completed course earlier in stay)- ID recs appreciated  - MRI as above  - follow up CT scan of C/A/P to look for occult source of infection, found to have 2.4 cm nodular soft tissue density, concerning for bladder lesion, 1.7 cm left renal lesion  - CTX for pulmonary coverage if he deteriorates per ID  GI:    - tolerating tube feeds via PEG  - rectal tube in place  PT/OT:   - CHUYITA    Per Hospitalist note, it may be feasible at this time to monitor patient's progress for meaningful recovery over weeks, months and then decide whether there is benefit of pursuing workup of bladder lesion. Spouse in agreement.    Spectralink # [id number]     61M with PMH of HTN, CAD s/p stent on DAPT, T2DM who complained of headache and subsequently became unresponsive found to have posterior fossa hemorrhage, IVH, hydrocephalus, s/p EVD and SOC on 11/4. 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 Course c/b respiratory failure and dysphagia s/p trach/PEG (11/19), VPS (11/23),Certas @4 completed course of cefepime for enterobacter and pseudomonas pneumonia on 11/21, fevers, c. diff colitis, urinary retention, b/l distal DVT. Trach changed 12/15 to #7 cuffless due to dislodgement. 12/16 CT A/P wo concern for infectious process. but concern for bladder lesion, wife will pursue possible treatment after rehab. Now with another bout of cdiff found after fever workup, on PO vanco per ID, blood cultures negative to date. +Rectal tube     PLAN:  Neuro:   - shunt  Certas @4  - PICA aneurysm resection  - pt is DNR    Respiratory:   - continue duoneb and mucomyst  - trach care, suction prn  - PE/DVT- on apixaban  CV:  - CAD - hx of stent- resume ASA 81 mg   - acute post op blood loss anemia improved after transfusion PRBC 12/22  - HTN- continue metoprolol  - on doxazosin for urinary retention  Endocrine:   - DMT2- continue fingersticks with NPH 13u q6  DVT ppx:   - on eliquis for dvt  Renal:   - hypernatremia- Na 143, continue free water 200q6  - MICHELLE has resolved  -Urinary retention- requiring straight cath q6  - Urology following for renal lesion, PSA/Cytology pending, GOC necessary for any urologic intervention  - f/u urine cytology is negative for high grade urothelial carcinoma  ID:   - recurrent fever and diarrhea- cdiff again positive, PO vanco restarted (completed course earlier in stay)- ID recs appreciated  - MRI as above  - follow up CT scan of C/A/P to look for occult source of infection, found to have 2.4 cm nodular soft tissue density, concerning for bladder lesion, 1.7 cm left renal lesion  - CTX for pulmonary coverage if he deteriorates per ID  - c/w PO vanco started 12/22 with plan for 14 days of therapy then month long taper  - if spikes again, may need LP or 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  GI:    - tolerating tube feeds via PEG  - rectal tube in place  PT/OT:   - CHUYITA    Per Hospitalist note, it may be feasible at this time to monitor patient's progress for meaningful recovery over weeks, months and then decide whether there is benefit of pursuing workup of bladder lesion. Spouse in agreement.    Spectralink # [id number]

## 2021-12-28 NOTE — PROGRESS NOTE ADULT - SUBJECTIVE AND OBJECTIVE BOX
[facility] Division of Hospital Medicine  Gaby Gaitan MD  Spectra: [phone]      Patient is a 61y old  Male who presents with a chief complaint of cerebellar bleed (27 Dec 2021 15:42)      SUBJECTIVE / OVERNIGHT EVENTS: no acute events overnight. 750cc output from rectal tube in last 24 hours though there is report of some leakage per staff. remains afebrile. unable to obtain ROS as patient non-verbal.  ADDITIONAL REVIEW OF SYSTEMS:    MEDICATIONS  (STANDING):  acetylcysteine 20%  Inhalation 4 milliLiter(s) Inhalation every 6 hours  albuterol/ipratropium for Nebulization 3 milliLiter(s) Nebulizer every 6 hours  apixaban 5 milliGRAM(s) Oral two times a day  artificial  tears Solution 1 Drop(s) Both EYES every 6 hours  aspirin  chewable 81 milliGRAM(s) Oral daily  atorvastatin 40 milliGRAM(s) Oral at bedtime  chlorhexidine 0.12% Liquid 15 milliLiter(s) Oral Mucosa two times a day  dextrose 50% Injectable 25 Gram(s) IV Push once  dextrose 50% Injectable 12.5 Gram(s) IV Push once  doxazosin 2 milliGRAM(s) Oral daily  insulin lispro (ADMELOG) corrective regimen sliding scale   SubCutaneous every 6 hours  insulin NPH human recombinant 13 Unit(s) SubCutaneous every 6 hours  metoprolol tartrate 12.5 milliGRAM(s) Oral two times a day  multivitamin 1 Tablet(s) Oral daily  nystatin Powder 1 Application(s) Topical two times a day  pantoprazole  Injectable 40 milliGRAM(s) IV Push daily  sodium chloride 3%  Inhalation 3 milliLiter(s) Inhalation every 6 hours  vancomycin    Solution 125 milliGRAM(s) Oral every 6 hours    MEDICATIONS  (PRN):  acetaminophen    Suspension .. 650 milliGRAM(s) Oral every 6 hours PRN Temp greater or equal to 38C (100.4F), Mild Pain (1 - 3)      CAPILLARY BLOOD GLUCOSE      POCT Blood Glucose.: 150 mg/dL (28 Dec 2021 06:09)  POCT Blood Glucose.: 141 mg/dL (27 Dec 2021 23:05)  POCT Blood Glucose.: 145 mg/dL (27 Dec 2021 17:09)    I&O's Summary    27 Dec 2021 07:01  -  28 Dec 2021 07:00  --------------------------------------------------------  IN: 2720 mL / OUT: 3250 mL / NET: -530 mL        PHYSICAL EXAM:  Vital Signs Last 24 Hrs  T(C): 36.6 (28 Dec 2021 07:52), Max: 37.3 (27 Dec 2021 15:00)  T(F): 97.9 (28 Dec 2021 07:52), Max: 99.1 (27 Dec 2021 15:00)  HR: 83 (28 Dec 2021 07:52) (83 - 93)  BP: 122/71 (28 Dec 2021 07:52) (117/73 - 133/84)  BP(mean): --  RR: 18 (28 Dec 2021 07:52) (18 - 20)  SpO2: 99% (28 Dec 2021 07:52) (99% - 100%)    CONSTITUTIONAL: NAD, well-developed, well-groomed  EYES: PERRLA; conjunctiva and sclera clear  ENMT: Moist oral mucosa, no pharyngeal injection or exudates; normal dentition  NECK: Supple, +trach site c/d/i  RESPIRATORY: Normal respiratory effort; lungs are clear to auscultation bilaterally  CARDIOVASCULAR: Regular rate and rhythm, normal S1 and S2, no murmur/rub/gallop; No lower extremity edema; Peripheral pulses are 2+ bilaterally  ABDOMEN: Soft, Nondistended, Nontender to palpation, normoactive bowel sounds, PEG site c/d/i, +rectal tube  MUSCULOSKELETAL:  No clubbing or cyanosis of digits; no joint swelling or tenderness to palpation  PSYCH: Alert, opens eyes to noxious stimuli, unable to assess orientation as nonverbal  NEUROLOGY: lethargic, not following commands, moving L fingers spontaneously  SKIN: No rashes; no palpable lesions    LABS:                        8.4    10.43 )-----------( 364      ( 27 Dec 2021 06:11 )             27.6     12-27    143  |  105  |  28<H>  ----------------------------<  127<H>  4.3   |  29  |  0.61    Ca    8.6      27 Dec 2021 06:12            RADIOLOGY & ADDITIONAL TESTS:  Results Reviewed:   Imaging Personally Reviewed:  Electrocardiogram Personally Reviewed:    COORDINATION OF CARE:  Care Discussed with Consultants/Other Providers [Y]: neurosurgery LUIS Urbina  Prior or Outpatient Records Reviewed [Y/N]:

## 2021-12-28 NOTE — PROGRESS NOTE ADULT - PROBLEM SELECTOR PLAN 2
completed course of PO vanco previously.  now with recurrent fever and diarrhea.  - rectal tube re-inserted  - infectious work-up as above  - c/w PO vanco started on 12/22 with plan for 14 days of therapy and month long taper  - GI PCR neg, C diff still positive  - need to keep close eye on rectal tube output and try avoid volume depletion with IVF as needed

## 2021-12-28 NOTE — PROGRESS NOTE ADULT - PROBLEM SELECTOR PLAN 11
CT a/p with a 2.4 cm nodular soft tissue density in the bladder appears separate from the prostate gland, concerning for bladder lesion  previous hospitalist d/w urology - findings concerning for bladder tumor vs prostate gland  PSA normal, urine cytology negative for malignancy  previous hospitalist spoke to urology team regarding cysto transurethral resection which requires general anesthesia. Per , recommend to f/u urine cytology result to determine next steps - will need to f/u with urology once acute issues improve.     Previous hospitalist spoke to the patient's wife, Sandrita Bauer [phone]. Given the patient's underlying comorbid conditions coupled with his recent devastating neurologic events, other medical complications that ensued, and his poor neurologic and functional status at this time, it may be prudent to monitor his progress and see if he makes any meaningful recovery over the coming weeks to months to decide whether the benefit of pursuing any invasive work up for the bladder lesion outweighs the risk. Spouse seems to be in agreement at the moment.

## 2021-12-28 NOTE — PROGRESS NOTE ADULT - SUBJECTIVE AND OBJECTIVE BOX
Chief Complaint:  Patient is a 61y old  Male who presents with a chief complaint of IVH/SAH (28 Dec 2021 12:45)      Date of service 12-28-21 @ 13:06      Interval Events:   Patient seen and examined. No acute overnight events.     Hospital Medications:  acetaminophen    Suspension .. 650 milliGRAM(s) Oral every 6 hours PRN  acetylcysteine 20%  Inhalation 4 milliLiter(s) Inhalation every 6 hours  albuterol/ipratropium for Nebulization 3 milliLiter(s) Nebulizer every 6 hours  apixaban 5 milliGRAM(s) Oral two times a day  artificial  tears Solution 1 Drop(s) Both EYES every 6 hours  aspirin  chewable 81 milliGRAM(s) Oral daily  atorvastatin 40 milliGRAM(s) Oral at bedtime  chlorhexidine 0.12% Liquid 15 milliLiter(s) Oral Mucosa two times a day  dextrose 50% Injectable 25 Gram(s) IV Push once  dextrose 50% Injectable 12.5 Gram(s) IV Push once  doxazosin 2 milliGRAM(s) Oral daily  insulin lispro (ADMELOG) corrective regimen sliding scale   SubCutaneous every 6 hours  insulin NPH human recombinant 13 Unit(s) SubCutaneous every 6 hours  metoprolol tartrate 12.5 milliGRAM(s) Oral two times a day  multivitamin 1 Tablet(s) Oral daily  nystatin Powder 1 Application(s) Topical two times a day  pantoprazole  Injectable 40 milliGRAM(s) IV Push daily  sodium chloride 3%  Inhalation 3 milliLiter(s) Inhalation every 6 hours  vancomycin    Solution 125 milliGRAM(s) Oral every 6 hours        Review of Systems:  unable to obtain    PHYSICAL EXAM:   Vital Signs:  Vital Signs Last 24 Hrs  T(C): 36.6 (28 Dec 2021 12:13), Max: 37.3 (27 Dec 2021 15:00)  T(F): 97.8 (28 Dec 2021 12:13), Max: 99.1 (27 Dec 2021 15:00)  HR: 88 (28 Dec 2021 12:13) (83 - 93)  BP: 136/77 (28 Dec 2021 12:13) (117/73 - 136/77)  BP(mean): --  RR: 18 (28 Dec 2021 12:13) (18 - 20)  SpO2: 99% (28 Dec 2021 12:13) (99% - 100%)  Daily     Daily       PHYSICAL EXAM:     GENERAL:  Appears stated age, well-groomed, well-nourished, no distress  HEENT:  NC/AT,  conjunctivae anicteric, clear and pink,   NECK: supple, trachea midline, +trach  CHEST:  Full & symmetric excursion, no increased effort, breath sounds clear  HEART:  Regular rhythm, no JVD  ABDOMEN:  Soft, non-tender, non-distended, normoactive bowel sounds,  no masses , no hepatosplenomegaly, +peg  EXTREMITIES:  no cyanosis,clubbing or edema  SKIN:  No rash, erythema, or, ecchymoses, no jaundice  NEURO:  Alert, non-focal, no asterixis  PSYCH: Appropriate affect, oriented to place and time  RECTAL: Deferred      LABS Personally reviewed by me:                        8.4    10.43 )-----------( 364      ( 27 Dec 2021 06:11 )             27.6       12-27    143  |  105  |  28<H>  ----------------------------<  127<H>  4.3   |  29  |  0.61    Ca    8.6      27 Dec 2021 06:12                                    8.4    10.43 )-----------( 364      ( 27 Dec 2021 06:11 )             27.6       Imaging personally reviewed by me:

## 2021-12-28 NOTE — PROGRESS NOTE ADULT - ASSESSMENT
61 year old man with respiratory failure d/t ICH    1. ICH  -per neurosurgery,  -s/p  shunt  -MRI done, as per neuro    2. Respiratory failure  -for tracheostomy, will require long term enteral nutrition  -s/p PEG, continue TF and use of abdominal binder  - aspiration precautions     3. Enterobacter PNA  -s/p course of antibiotics     4. Anemia, no overt GI bleed. EGD unremarkable.   -hgb stable  -monitor for GI bleeding     5. Cdiff infection   -on Vanco  -continue banatrol in feeds  -75cc in rectal tube drainage bag, improving    6. DVT on a/c  -apixaban 5mg resumed  -continue PPI       Attending supervision statement: I have personally seen and examined the patient. I fully participated in the care of this patient. I have made amendments to the documentation where necessary, and agree with the history, physical exam, and plan as outlined by the ACP.    Attending supervision statement: I have personally seen and examined the patient. I fully participated in the care of this patient. I have made amendments to the documentation where necessary, and agree with the history, physical exam, and plan as outlined by the ACP.        [facility]  Gastroenterology and Hepatology  [address] [place], [place]  Office: [phone]  Cell: [phone]

## 2021-12-28 NOTE — PROGRESS NOTE ADULT - SUBJECTIVE AND OBJECTIVE BOX
SUBJECTIVE: Pt seen and examined, resting in bed    OVERNIGHT EVENTS: none    Vital Signs Last 24 Hrs  T(C): 36.6 (28 Dec 2021 12:13), Max: 37.3 (27 Dec 2021 15:00)  T(F): 97.8 (28 Dec 2021 12:13), Max: 99.1 (27 Dec 2021 15:00)  HR: 88 (28 Dec 2021 12:13) (83 - 93)  BP: 136/77 (28 Dec 2021 12:13) (117/73 - 136/77)  BP(mean): --  RR: 18 (28 Dec 2021 12:13) (18 - 20)  SpO2: 99% (28 Dec 2021 12:13) (99% - 100%)    PHYSICAL EXAM:    General: No Acute Distress     Neurological: Awake, eyes open with downward gaze,  trach collar, intermittently FC (squeezes hand), UE 0/5, b/l LE wds    Pulmonary: Clear to Auscultation, No Rales, No Rhonchi, No Wheezes     Cardiovascular: S1, S2, Regular Rate and Rhythm     Gastrointestinal: Soft, Nontender, Nondistended     Incision: healed incision    LABS:                        8.4    10.43 )-----------( 364      ( 27 Dec 2021 06:11 )             27.6    12-27    143  |  105  |  28<H>  ----------------------------<  127<H>  4.3   |  29  |  0.61    Ca    8.6      27 Dec 2021 06:12          12-27 @ 07:01  -  12-28 @ 07:00  --------------------------------------------------------  IN: 2720 mL / OUT: 3250 mL / NET: -530 mL      DRAINS: none    MEDICATIONS:  Antibiotics:  vancomycin    Solution 125 milliGRAM(s) Oral every 6 hours    Neuro:  acetaminophen    Suspension .. 650 milliGRAM(s) Oral every 6 hours PRN Temp greater or equal to 38C (100.4F), Mild Pain (1 - 3)    Cardiac:  doxazosin 2 milliGRAM(s) Oral daily  metoprolol tartrate 12.5 milliGRAM(s) Oral two times a day    Pulm:  acetylcysteine 20%  Inhalation 4 milliLiter(s) Inhalation every 6 hours  albuterol/ipratropium for Nebulization 3 milliLiter(s) Nebulizer every 6 hours  sodium chloride 3%  Inhalation 3 milliLiter(s) Inhalation every 6 hours    GI/:  pantoprazole  Injectable 40 milliGRAM(s) IV Push daily    Other:   apixaban 5 milliGRAM(s) Oral two times a day  artificial  tears Solution 1 Drop(s) Both EYES every 6 hours  aspirin  chewable 81 milliGRAM(s) Oral daily  atorvastatin 40 milliGRAM(s) Oral at bedtime  chlorhexidine 0.12% Liquid 15 milliLiter(s) Oral Mucosa two times a day  dextrose 50% Injectable 25 Gram(s) IV Push once  dextrose 50% Injectable 12.5 Gram(s) IV Push once  insulin lispro (ADMELOG) corrective regimen sliding scale   SubCutaneous every 6 hours  insulin NPH human recombinant 13 Unit(s) SubCutaneous every 6 hours  multivitamin 1 Tablet(s) Oral daily  nystatin Powder 1 Application(s) Topical two times a day    DIET: [] Regular [] CCD [] Renal [] Puree [] Dysphagia [x] Tube Feeds:     IMAGING:   < from: CT Abdomen and Pelvis w/ IV Cont (12.16.21 @ 09:26) >    IMPRESSION:  A 2.4 cm nodular soft tissue density in the bladder appears separate from   the prostate gland, concerning for bladder lesion. Correlate with   cystoscopy.    A 2.7 cm fluid collection adjacent to the right ischial tuberosity, may   be related to ischiogluteal bursitis.    Indeterminant 1.7 cm left renal lesion. Contrast-enhanced MRI can be   performed further evaluation.    Mild fatty infiltration along the PEG tube catheter without drainable   fluid collection.    < end of copied text >  < from: CT Head No Cont (12.12.21 @ 15:38) >  IMPRESSION:    Similar ventricular size when compared to 12/9/2021.    < end of copied text >  < from: CT Head No Cont (12.07.21 @ 11:42) >  IMPRESSION: Left suboccipital craniectomy and cerebellar postoperative changes. Right parietal  shunt catheter. Moderate enlargement of the ventricles compared with 11/26/2021..    < end of copied text >  < from: MR Head w/wo IV Cont (12.23.21 @ 23:50) >  IMPRESSION: Postop changes are again identified with extra axial   collection seen in the postoperative region. This could be compatible   with pseudomeningocele, though the possibility of abscess or dural leak   cannot entirely excluded. Clinical correlation and continued close   interval is recommended.    Small foci of abnormal restricted diffusion is seen involving the right   centrum semiovale and medial right frontal cortex. These findings are   likely compatible with acute lacunar infarcts.    --- End of Report ---    < end of copied text >  < from: [facility] Duplex Lower Ext Vein Scan, Bilat (12.08.21 @ 12:21) >    IMPRESSION:    Persistent bilateral below the knees deep venous thrombosis in the   bilateral calf veins. No propagation.    < end of copied text >

## 2021-12-28 NOTE — PROGRESS NOTE ADULT - ASSESSMENT
60 yo male with PMH of HTN   Admitted on 11/4 with severe headache and found to have cerebellar bleed.  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  S/p trach and peg.   He has distal DVT's - on apixaban.  He received cefepime 11/4-11/21 for respiratory coverage.  Developed watery C diff diarrhea on 11/26 - started on oral vancomycin   Then restarted on cefepime on 11/28 for concerns of pneumonia since had low grade temps. Giorgio neb added later  But CXR's remained clear.  Ct of brain showed residual blood.  Pseudomonas in sputum found to be resistant to carbapenems & quinolones. Sputum isolated strep pneumoniae as well    11/28 urine and blood cultures are negative.  Cefepime completed on 12/06 & Giorgio nebs on 12/07/21  Failed TOV & teixeira was replaced for retention of 1000 ml of urine on 12/06/21 PM  Spiked a fever to 102F ? in response to retention  UA with 3 WBCs, no nit or LE, large blood   CXR with clear lungs  CTH revealed a Pseudomeningocele, s/p tap with gram stain without organism  CSF finalized as no growth  Blood & urine cx also finalized as no growth  Completed treatment for C diff w 2 wks of oral vanco on 12/10/21  Diarrhea resolved  He developed recurrent fever and relapsed with C diff  Vanco restarted on 12/22  He has a rectal tube in place.  At high risk for aspiration and  infections given history of retention and poor mental status.  Suggest:  1. Vanco x 1 more week than slow taper   2.disposition per NS and medicine  3.additional ID w/u as necessary   1

## 2021-12-28 NOTE — PROGRESS NOTE ADULT - PROBLEM SELECTOR PLAN 1
new recurrent fever on 12/22, 12/23 with worsening diarrhea now resolved.  - CXR with clear lungs  - UA with pyuria, leuk est, urine cx negative  - blood cx negative  - found to have recurrent c. diff infection as abelow  - ID following, recs appreciated  - c/w PO vanco started 12/22 with plan for 14 days of therapy then month long taper  - if spikes again, may need LP or tap of pseudomeningocele seen on MRI as per ID

## 2021-12-28 NOTE — PROGRESS NOTE ADULT - ASSESSMENT
61M with PMH of HTN, CAD s/p stent on DAPT, T2DM who complained of headache and subsequently became unresponsive found to have posterior fossa hemorrhage, IVH, hydrocephalus, s/p EVD and SOC on 11/4. 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 Course c/b respiratory failure and dysphagia s/p trach/PEG (11/19), VPS (11/23), completed course of cefepime for enterobacter and pseudomonas pneumonia on 11/21, fevers, c. diff colitis, urinary retention, b/l distal DVT. New fevers again on 12/22 with diarrhea found to have recurrent c. diff infection.

## 2021-12-28 NOTE — PROGRESS NOTE ADULT - ASSESSMENT
Wound Consult to assist w/ management of Sacral/bilateral Buttocks deep tissue injury  Incontinence of bowel  Incontinence Dermatitis  Resolved superimposed fungal rash    Recommend:  1.) sacral/buttock injury -Allevyn       Bilateral groins - InterdryAg after cleansing   2.) Incontinence Management - incontinence cleanser, pads, pericare BID, continue fecal management system  3.) Maintain on an alternating air with low air loss surface  4.) Turn and reposition Q 2 hours  5.) Nutrition optimization w/ Moderate Malnutrition, Increased Nutrient Needs w/ TF, vit c, mvi, luz marina, & probiotics  6.) Hyperglycemia improving w/ glucerna TF and NPH insulin w/ FS & ISS  7.) Offload heels/feet with complete cair air fluidized boots; ensure that the soles of the feet are not resting on the foot board of the bed.  Care as per medicine, Will continue to follow with you  Upon discharge f/u as outpatient at [facility] [address] [facility] [phone]  s/w RN and d/w team  Rosario Gongora PA-C, CWS 44565  I spent 25mnutes face to face w/ this pt of which more than 50% of the time was spent counseling & coordinating care of this pt.  Wound Consult to assist w/ management of Sacral/bilateral Buttocks deep tissue injury  Incontinence of bowel  Incontinence Dermatitis  Resolved superimposed fungal rash    Recommend:  1.) sacral/buttock injury -Allevyn       Bilateral groins - InterdryAg after cleansing   2.) Incontinence Management - continue pericare BID, w/ assist of attends underpads and fecal management system        as per protocol  3.) Maintain on an alternating air with low air loss surface  4.) Turn and reposition Q 2 hours  5.) Nutrition optimization w/ Moderate Malnutrition, Increased Nutrient Needs w/ TF, vit c, mvi, luz marina, & probiotics  6.) Hyperglycemia improving w/ glucerna TF and NPH insulin w/ FS & ISS  7.) Offload heels/feet with complete cair air fluidized boots; ensure that the soles of the feet are not resting on the foot board of the bed.  Care as per medicine, Will continue to follow with you  Upon discharge f/u as outpatient at [facility] [address] [facility] [phone]  s/w RN and d/w team  Rosario Gongora PA-C, CWS 70226  I spent 25mnutes face to face w/ this pt of which more than 50% of the time was spent counseling & coordinating care of this pt.  A/p 61M with PMH of HTN, CAD s/p stent on DAPT, T2DM who complained of headache and subsequently became unresponsive found to have posterior fossa hemorrhage, IVH, hydrocephalus, s/p EVD and SOC on 11/4. 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 Course c/b respiratory failure and dysphagia s/p trach/PEG (11/19), VPS (11/23), completed course of cefepime for enterobacter and pseudomonas pneumonia on 11/21, fevers, c. diff colitis, urinary retention, b/l distal DVT. New fevers again on 12/22 with diarrhea found to have recurrent c. diff infection.    Wound Consult to assist w/ management of Sacral/bilateral Buttocks deep tissue injury  Incontinence of bowel  Incontinence Dermatitis  Resolved superimposed fungal rash    Recommend:  1.) sacral/buttock injury -Allevyn       Bilateral groins - InterdryAg after cleansing   2.) Incontinence Management - continue pericare BID, w/ assist of attends underpads and fecal management system        as per protocol  3.) Maintain on an alternating air with low air loss surface  4.) Turn and reposition Q 2 hours  5.) Nutrition optimization w/ Moderate Malnutrition, Increased Nutrient Needs w/ TF, vit c, mvi, luz marina, & probiotics  6.) Hyperglycemia improving w/ glucerna TF and NPH insulin w/ FS & ISS  7.) Abx as per ID for cdiff, continue use of FMS as appropriate  8.) Offload heels/feet with complete cair air fluidized boots; ensure that the soles of the feet are not resting on the foot board of the bed.  Care as per medicine, Will continue to follow with you  Upon discharge f/u as outpatient at [facility]ound Center [address] [facility] [phone]  s/w RN and d/w team  Rosario Gongora PA-C, CWS 61271  I spent 25mnutes face to face w/ this pt of which more than 50% of the time was spent counseling & coordinating care of this pt.

## 2021-12-29 LAB — SARS-COV-2 RNA SPEC QL NAA+PROBE: SIGNIFICANT CHANGE UP

## 2021-12-29 PROCEDURE — 99232 SBSQ HOSP IP/OBS MODERATE 35: CPT

## 2021-12-29 RX ADMIN — SODIUM CHLORIDE 3 MILLILITER(S): 9 INJECTION INTRAMUSCULAR; INTRAVENOUS; SUBCUTANEOUS at 23:23

## 2021-12-29 RX ADMIN — Medication 12.5 MILLIGRAM(S): at 06:03

## 2021-12-29 RX ADMIN — SODIUM CHLORIDE 3 MILLILITER(S): 9 INJECTION INTRAMUSCULAR; INTRAVENOUS; SUBCUTANEOUS at 00:13

## 2021-12-29 RX ADMIN — SODIUM CHLORIDE 3 MILLILITER(S): 9 INJECTION INTRAMUSCULAR; INTRAVENOUS; SUBCUTANEOUS at 17:43

## 2021-12-29 RX ADMIN — Medication 3 MILLILITER(S): at 12:28

## 2021-12-29 RX ADMIN — SODIUM CHLORIDE 3 MILLILITER(S): 9 INJECTION INTRAMUSCULAR; INTRAVENOUS; SUBCUTANEOUS at 06:04

## 2021-12-29 RX ADMIN — Medication 4 MILLILITER(S): at 17:43

## 2021-12-29 RX ADMIN — HUMAN INSULIN 13 UNIT(S): 100 INJECTION, SUSPENSION SUBCUTANEOUS at 12:26

## 2021-12-29 RX ADMIN — CHLORHEXIDINE GLUCONATE 15 MILLILITER(S): 213 SOLUTION TOPICAL at 17:44

## 2021-12-29 RX ADMIN — Medication 1 DROP(S): at 23:24

## 2021-12-29 RX ADMIN — Medication 4 MILLILITER(S): at 23:24

## 2021-12-29 RX ADMIN — ATORVASTATIN CALCIUM 40 MILLIGRAM(S): 80 TABLET, FILM COATED ORAL at 23:24

## 2021-12-29 RX ADMIN — Medication 125 MILLIGRAM(S): at 17:42

## 2021-12-29 RX ADMIN — HUMAN INSULIN 13 UNIT(S): 100 INJECTION, SUSPENSION SUBCUTANEOUS at 06:05

## 2021-12-29 RX ADMIN — CHLORHEXIDINE GLUCONATE 15 MILLILITER(S): 213 SOLUTION TOPICAL at 06:03

## 2021-12-29 RX ADMIN — APIXABAN 5 MILLIGRAM(S): 2.5 TABLET, FILM COATED ORAL at 06:02

## 2021-12-29 RX ADMIN — Medication 650 MILLIGRAM(S): at 23:52

## 2021-12-29 RX ADMIN — Medication 1 DROP(S): at 00:12

## 2021-12-29 RX ADMIN — PANTOPRAZOLE SODIUM 40 MILLIGRAM(S): 20 TABLET, DELAYED RELEASE ORAL at 12:27

## 2021-12-29 RX ADMIN — APIXABAN 5 MILLIGRAM(S): 2.5 TABLET, FILM COATED ORAL at 17:45

## 2021-12-29 RX ADMIN — Medication 12.5 MILLIGRAM(S): at 17:42

## 2021-12-29 RX ADMIN — Medication 125 MILLIGRAM(S): at 12:27

## 2021-12-29 RX ADMIN — Medication 125 MILLIGRAM(S): at 06:04

## 2021-12-29 RX ADMIN — HUMAN INSULIN 13 UNIT(S): 100 INJECTION, SUSPENSION SUBCUTANEOUS at 00:14

## 2021-12-29 RX ADMIN — SODIUM CHLORIDE 3 MILLILITER(S): 9 INJECTION INTRAMUSCULAR; INTRAVENOUS; SUBCUTANEOUS at 12:28

## 2021-12-29 RX ADMIN — HUMAN INSULIN 13 UNIT(S): 100 INJECTION, SUSPENSION SUBCUTANEOUS at 17:42

## 2021-12-29 RX ADMIN — Medication 125 MILLIGRAM(S): at 23:23

## 2021-12-29 RX ADMIN — Medication 125 MILLIGRAM(S): at 00:14

## 2021-12-29 RX ADMIN — HUMAN INSULIN 13 UNIT(S): 100 INJECTION, SUSPENSION SUBCUTANEOUS at 23:24

## 2021-12-29 RX ADMIN — Medication 3 MILLILITER(S): at 23:23

## 2021-12-29 RX ADMIN — Medication 650 MILLIGRAM(S): at 06:05

## 2021-12-29 RX ADMIN — Medication 1 DROP(S): at 06:02

## 2021-12-29 RX ADMIN — Medication 4 MILLILITER(S): at 06:04

## 2021-12-29 RX ADMIN — Medication 650 MILLIGRAM(S): at 23:22

## 2021-12-29 RX ADMIN — Medication 2 MILLIGRAM(S): at 06:02

## 2021-12-29 RX ADMIN — Medication 3 MILLILITER(S): at 06:02

## 2021-12-29 RX ADMIN — Medication 4 MILLILITER(S): at 12:27

## 2021-12-29 RX ADMIN — Medication 2: at 17:41

## 2021-12-29 RX ADMIN — Medication 3 MILLILITER(S): at 17:43

## 2021-12-29 RX ADMIN — Medication 1 DROP(S): at 17:41

## 2021-12-29 RX ADMIN — Medication 650 MILLIGRAM(S): at 06:35

## 2021-12-29 RX ADMIN — Medication 81 MILLIGRAM(S): at 12:27

## 2021-12-29 RX ADMIN — Medication 4 MILLILITER(S): at 00:14

## 2021-12-29 RX ADMIN — Medication 1 TABLET(S): at 12:29

## 2021-12-29 RX ADMIN — Medication 1 DROP(S): at 12:27

## 2021-12-29 NOTE — PROGRESS NOTE ADULT - NSPROGADDITIONALINFOA_GEN_ALL_CORE
.  Gaby Gaitan MD  Division of Hospital Medicine  [facility]   Spectra: [phone]    Plan discussed with neurosurgery QASIM Bell.

## 2021-12-29 NOTE — PROGRESS NOTE ADULT - SUBJECTIVE AND OBJECTIVE BOX
CC: f/u for C diff    Patient reports: he is alert but not verbal    REVIEW OF SYSTEMS:  All other review of systems negative (Comprehensive ROS)    Antimicrobials Day #  :day 8/14  vancomycin    Solution 125 milliGRAM(s) Oral every 6 hours    Other Medications Reviewed    T(F): 97.5 (12-29-21 @ 07:31), Max: 98.1 (12-29-21 @ 00:16)  HR: 88 (12-29-21 @ 07:31)  BP: 134/85 (12-29-21 @ 07:31)  RR: 18 (12-29-21 @ 07:31)  SpO2: 100% (12-29-21 @ 07:31)  Wt(kg): --    PHYSICAL EXAM:  General: alert, no acute distress  Eyes:  anicteric, no conjunctival injection, no discharge  Oropharynx: no lesions or injection 	  Neck: supple, trach  Lungs: clear to auscultation  Heart: regular rate and rhythm; no murmur, rubs or gallops  Abdomen: soft, nondistended, nontender, peg  Skin: no lrash  Extremities: no clubbing, cyanosis, or edema  Neurologic:awake, poorly interactive, fecal bag with minimal output    LAB RESULTS:              MICROBIOLOGY:  RECENT CULTURES:      RADIOLOGY REVIEWED:

## 2021-12-29 NOTE — PROGRESS NOTE ADULT - PROBLEM SELECTOR PLAN 11
CT a/p with a 2.4 cm nodular soft tissue density in the bladder appears separate from the prostate gland, concerning for bladder lesion  - as per urology - findings concerning for bladder tumor vs prostate gland. cysto transurethral resection would require general anesthesia. recommended checking urine cytology to determine next steps which returned negative. will still need outpatient urology follow-up regardless once acute issues improve  - PSA normal, urine cytology negative for malignancy  - previous hospitalist spoke to the patient's wife, Sandrita Bauer [phone]. Given the patient's underlying comorbid conditions coupled with his recent devastating neurologic events, other medical complications that ensued, and his poor neurologic and functional status at this time, it may be prudent to monitor his progress and see if he makes any meaningful recovery over the coming weeks to months to decide whether the benefit of pursuing any invasive work up for the bladder lesion outweighs the risk. Spouse seems to be in agreement at the moment.

## 2021-12-29 NOTE — PROGRESS NOTE ADULT - SUBJECTIVE AND OBJECTIVE BOX
[facility] Division of Hospital Medicine  Gaby Gaitan MD  Spectra: [phone]      Patient is a 61y old  Male who presents with a chief complaint of ICH (29 Dec 2021 08:45)      SUBJECTIVE / OVERNIGHT EVENTS: no acute events overnight. unable to obtain ROS as patient non-verbal.  ADDITIONAL REVIEW OF SYSTEMS:    MEDICATIONS  (STANDING):  acetylcysteine 20%  Inhalation 4 milliLiter(s) Inhalation every 6 hours  albuterol/ipratropium for Nebulization 3 milliLiter(s) Nebulizer every 6 hours  apixaban 5 milliGRAM(s) Oral two times a day  artificial  tears Solution 1 Drop(s) Both EYES every 6 hours  aspirin  chewable 81 milliGRAM(s) Oral daily  atorvastatin 40 milliGRAM(s) Oral at bedtime  chlorhexidine 0.12% Liquid 15 milliLiter(s) Oral Mucosa two times a day  dextrose 50% Injectable 25 Gram(s) IV Push once  dextrose 50% Injectable 12.5 Gram(s) IV Push once  doxazosin 2 milliGRAM(s) Oral daily  insulin lispro (ADMELOG) corrective regimen sliding scale   SubCutaneous every 6 hours  insulin NPH human recombinant 13 Unit(s) SubCutaneous every 6 hours  metoprolol tartrate 12.5 milliGRAM(s) Oral two times a day  multivitamin 1 Tablet(s) Oral daily  pantoprazole  Injectable 40 milliGRAM(s) IV Push daily  sodium chloride 3%  Inhalation 3 milliLiter(s) Inhalation every 6 hours  vancomycin    Solution 125 milliGRAM(s) Oral every 6 hours    MEDICATIONS  (PRN):  acetaminophen    Suspension .. 650 milliGRAM(s) Oral every 6 hours PRN Temp greater or equal to 38C (100.4F), Mild Pain (1 - 3)      CAPILLARY BLOOD GLUCOSE      POCT Blood Glucose.: 131 mg/dL (29 Dec 2021 12:06)  POCT Blood Glucose.: 130 mg/dL (29 Dec 2021 05:29)  POCT Blood Glucose.: 115 mg/dL (29 Dec 2021 00:11)  POCT Blood Glucose.: 121 mg/dL (28 Dec 2021 17:07)    I&O's Summary    28 Dec 2021 07:01  -  29 Dec 2021 07:00  --------------------------------------------------------  IN: 2185 mL / OUT: 1800 mL / NET: 385 mL        PHYSICAL EXAM:  Vital Signs Last 24 Hrs  T(C): 36.4 (29 Dec 2021 07:31), Max: 36.7 (29 Dec 2021 00:16)  T(F): 97.5 (29 Dec 2021 07:31), Max: 98.1 (29 Dec 2021 00:16)  HR: 88 (29 Dec 2021 07:31) (81 - 93)  BP: 134/85 (29 Dec 2021 07:31) (108/70 - 136/76)  BP(mean): --  RR: 18 (29 Dec 2021 07:31) (18 - 24)  SpO2: 100% (29 Dec 2021 07:31) (98% - 100%)    CONSTITUTIONAL: NAD, well-developed, well-groomed  EYES: PERRLA; conjunctiva and sclera clear  ENMT: Moist oral mucosa, no pharyngeal injection or exudates; normal dentition  NECK: Supple, +trach site c/d/i  RESPIRATORY: Normal respiratory effort; lungs are clear to auscultation bilaterally  CARDIOVASCULAR: Regular rate and rhythm, normal S1 and S2, no murmur/rub/gallop; No lower extremity edema; Peripheral pulses are 2+ bilaterally  ABDOMEN: Soft, Nondistended, Nontender to palpation, normoactive bowel sounds, PEG site c/d/i, +rectal tube  MUSCULOSKELETAL:  No clubbing or cyanosis of digits; no joint swelling or tenderness to palpation  PSYCH: Alert, opens eyes to noxious stimuli, unable to assess orientation as nonverbal  NEUROLOGY: lethargic, not following commands, moving L fingers/hand spontaneously  SKIN: No rashes; no palpable lesions    LABS:                      RADIOLOGY & ADDITIONAL TESTS:  Results Reviewed:   Imaging Personally Reviewed:  Electrocardiogram Personally Reviewed:    COORDINATION OF CARE:  Care Discussed with Consultants/Other Providers [Y]: Neurosurgery QASIM Bell  Prior or Outpatient Records Reviewed [Y]: ID, GI progress notes

## 2021-12-29 NOTE — PROGRESS NOTE ADULT - PROBLEM SELECTOR PLAN 1
new recurrent fever on 12/22, 12/23 with worsening diarrhea now resolved.  - CXR with clear lungs  - UA with pyuria, leuk est, urine cx negative  - blood cx negative  - found to have recurrent c. 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

## 2021-12-29 NOTE — PROGRESS NOTE ADULT - ASSESSMENT
61M hx HTN, DM, cardiac stent on ASA. At work complaining of HA ~8:30, starting feeling dizzy and vomiting, HTN/keke when EMS got to him. SBP 220s, HR 50s.     On EMS arrival at 09:39AM 11/4/21, patient seen talking a little, garbling, moving all extremities, then quickly became unresponsive. No known blood thinners.    CTH shows large posterior fossa bleed w/ IVH/SAH/hydro.    PROCEDURE: Suboccipital craniectomy with cervical laminectomy for decompression of medulla and spinal cord    Brain aneurysm surgery    Open tracheostomy     shunt       POD#    PLAN:  Neuro: Cont HMV drain.  DC PCA and Barney to TOV. Inc activity/OOB.     Cardio-    Medicine-    Respiratory: Patient instructed to use incentive spirometer [ X] YES [ ] NO              DVT ppx: [X ] SQL [ ] SQH and Venodynes [ ] Left [ ] Right [ X] Bilateral    Discharge Planning:  The patient was evaluated by PT and recommended out patient PT/A. Rehab/S. Acute Rehab.   She/He was subsequently DC on /2018 in stable condition.    More than 30 minutes spent on total encounter: more than 50% of the visit was spent on educating the patient and family regarding condition, medications, follow up plans, signs and symptoms to be concerned with, preparing paperwork, and questions answered regarding discharge.

## 2021-12-29 NOTE — PROGRESS NOTE ADULT - ASSESSMENT
62 yo male with PMH of HTN   Admitted on 11/4 with severe headache and found to have cerebellar bleed.  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  S/p trach and peg.   He has distal DVT's - on apixaban.  He received cefepime 11/4-11/21 for respiratory coverage.  Developed watery C diff diarrhea on 11/26 - started on oral vancomycin   Then restarted on cefepime on 11/28 for concerns of pneumonia since had low grade temps. Giorgio neb added later  But CXR's remained clear.  Ct of brain showed residual blood.  Pseudomonas in sputum found to be resistant to carbapenems & quinolones. Sputum isolated strep pneumoniae as well    11/28 urine and blood cultures are negative.  Cefepime completed on 12/06 & Giorgio nebs on 12/07/21  Failed TOV & teixeira was replaced for retention of 1000 ml of urine on 12/06/21 PM  Spiked a fever to 102F ? in response to retention  UA with 3 WBCs, no nit or LE, large blood   CXR with clear lungs  CTH revealed a Pseudomeningocele, s/p tap with gram stain without organism  CSF finalized as no growth  Blood & urine cx also finalized as no growth  Completed treatment for C diff w 2 wks of oral vanco on 12/10/21  Diarrhea resolved  He developed recurrent fever and relapsed with C diff  Vanco restarted on 12/22  He has a rectal tube in place.  At high risk for aspiration and  infections given history of retention and poor mental status.  His fecal output has decreased  Suggest:  1. Vanco x 6 more days  than slow taper   2.disposition per NS and medicine  3.additional ID w/u as necessary

## 2021-12-29 NOTE — PROGRESS NOTE ADULT - ASSESSMENT
61M hx HTN, DM, cardiac stent on ASA. At work complaining of HA ~8:30, starting feeling dizzy and vomiting, HTN/keke when EMS got to him. SBP 220s, HR 50s.     On EMS arrival at 09:39AM 11/4/21, patient seen talking a little, garbling, moving all extremities, then quickly became unresponsive. No known blood thinners.    CTH shows large posterior fossa bleed w/ IVH/SAH/hydro.    PROCEDURE: Adm 11/4 Suboccipital craniectomy with cervical laminectomy for decompression of medulla and spinal cord. Brain aneurysm surgery. Open tracheostomy.  shunt  POD# 55/54/50/49/36     PLAN:  Neuro: Isolation for C.Diff-on PO Vanco x 6more days then slow taper x 1mth. Trach Collar. intermitt St Cath. Rectal Tube. Na stable-cont Free water 200cc Q6h. Anemia trending downwards-ck CBC AM Cont to add Banatrol daily. +DVT on Eliquis.  Inc activity/OOB. DC to Rehab isolation bed when available.    ID note of 12/29-62 yo male with PMH of HTN. Admitted on 11/4 with severe headache and found to have cerebellar bleed. 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 S/p trach and peg.   He has distal DVT's - on apixaban. He received cefepime 11/4-11/21 for respiratory coverage. Developed watery C diff diarrhea on 11/26 - started on oral vancomycin Then restarted on cefepime on 11/28 for concerns of pneumonia since had low grade temps. Giorgio neb added later But CXR's remained clear. Ct of brain showed residual blood. Pseudomonas in sputum found to be resistant to carbapenems & quinolones. Sputum isolated strep pneumoniae as well  11/28 urine and blood cultures are negative. Cefepime completed on 12/06 & Giorgio nebs on 12/07/21. Failed TOV & teixeira was replaced for retention of 1000 ml of urine on 12/06/21 PM. Spiked a fever to 102F ? in response to retention. UA with 3 WBCs, no nit or LE, large blood CXR with clear lungs CTH revealed a Pseudomeningocele, s/p tap with gram stain without organism CSF finalized as no growth Blood & urine cx also finalized as no growth Completed treatment for C diff w 2 wks of oral vanco on 12/10/21 Diarrhea resolved He developed recurrent fever and relapsed with C diff Vanco restarted on 12/22 He has a rectal tube in place.  At high risk for aspiration and  infections given history of retention and poor mental status. His fecal output has decreased Suggest: 1. Vanco x 6 more days  than slow taper 2.disposition per NS and medicine 3.additional ID w/u as necessary Electronic Signatures:  Agapito Ivan)     GI note of 12/28-61 year old man with respiratory failure d/t ICH.  Respiratory failure -for tracheostomy, will require long term enteral nutrition -s/p PEG, continue TF and use of abdominal binder - aspiration precautions. 3. Enterobacter PNA -s/p course of antibiotics   4. Anemia, no overt GI bleed. EGD unremarkable. -hgb stable -monitor for GI bleeding 5. Cdiff infection -on Vanco -continue banatrol in feeds -75cc in rectal tube drainage bag, improving 6. DVT on a/c -apixaban 5mg resumed  -continue PPI. [facility] [address] [place], [place] Office: [phone] Cell: [phone] Electronic Signatures: Salbador Noel)   (Signed 28-Dec-2021 18:12) Co-Signer: Progress Note, Subjective and Objective, Assessment and Plan. Chanell Sanz (NP).     Hosp note of 12/28-{[id number]} Problem/Plan - 1: ·  Problem: Fever. ·  Plan: new recurrent fever on 12/22, 12/23 with worsening diarrhea now resolved. - CXR with clear lungs - UA with pyuria, leuk est, urine cx negative - blood cx negative - found to have recurrent c. 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 {[id number]} Problem/Plan - 2: ·  Problem: C. difficile diarrhea. ·  Plan: completed course of PO vanco previously. now with recurrent fever and diarrhea. - rectal tube re-inserted - infectious work-up as above - c/w PO vanco started on 12/22 with plan for 14 days of therapy and month long taper - GI PCR neg, C diff still positive - need to keep close eye on rectal tube output and try avoid volume depletion with IVF as needed. {[id number]} Problem/Plan - 3: ·  Problem: Anemia. ·  Plan: overall stable. generally is in 7-8 range with intermittent need for transfusion. - transfused 1u PRBC on 12/22 for Hb 6.8 with appropriate response - monitor Hb on CBC daily  - transfuse for Hb<7. {[id number]} Problem/Plan - 4: ·  Problem: IVH (intraventricular hemorrhage). ·  Plan: s/p VPS placement on 11/23. s/p PICA aneurysm resection. - management as per neurosurgery. {[id number]} Problem/Plan - 5: ·  Problem: Respiratory failure. ·  Plan: s/p trach on trach collar  trach secured per ENT on 12/15 - continue trach care and suctioning - monitor O2 sats - continue nebs. change to xopenex if tachycardia persists but overall improved. {[id number]} Problem/Plan - 6: ·  Problem: MICHELLE (acute kidney injury). ·  Plan: found to be in urinary retention requiring intermittent cath MICHELLE resolved - monitor urine output, renal function - c/w doxazosin. {[id number]} Problem/Plan - 7: ·  Problem: DVT of leg (deep venous thrombosis). ·  Plan: right soleal vein DVT and persistent left posterior tibial, peroneal, gastrocnemius and soleal vein DVT without proximal propagation on last duplex on 11/24 - started on Eliquis 12/2--> monitor for bleed. {[id number]} Problem/Plan - 8: ·  Problem: CAD (coronary artery disease). ·  Plan: s/p stent. was on DAPT per prescription refills  - resume ASA when clear from neurosurgery standpoint - c/w statin (transaminitis resolved) - patient was on metoprolol ER 25mg at home. continue metoprolol 12.5mg BID - GI noting afib with RVR? but no other documentation in chart found supporting that and patient seems to be in sinus rhythm.{[id number]} Problem/Plan - 9: ·  Problem: HTN (hypertension). ·  Plan: - off labetalol now - monitor BP. {[id number]} Problem/Plan - 10: ·  Problem: DM (diabetes mellitus). ·  Plan; HbA1c 6.5% - FS at goal - c/w NPH 13 units q6h - monitor FS q6h. {[id number]} Problem/Plan - 11: ·  Problem: Lesion of bladder. ·  Plan: CT a/p with a 2.4 cm nodular soft tissue density in the bladder appears separate from the prostate gland, concerning for bladder lesion previous hospitalist d/w urology - findings concerning for bladder tumor vs prostate gland PSA normal, urine cytology negative for malignancy previous hospitalist spoke to urology team regarding cysto transurethral resection which requires general anesthesia. Per , recommend to f/u urine cytology result to determine next steps - will need to f/u with urology once acute issues improve. Previous hospitalist spoke to the patient's wife, Sandrita Bauer [phone]. Given the patient's underlying comorbid conditions coupled with his recent devastating neurologic events, other medical complications that ensued, and his poor neurologic and functional status at this time, it may be prudent to monitor his progress and see if he makes any meaningful recovery over the coming weeks to months to decide whether the benefit of pursuing any invasive work up for the bladder lesion outweighs the risk. Spouse seems to be in agreement at the moment. {[id number]} Problem/Plan - 12: ·  Problem: Prophylactic measure. ·  Plan: DVT ppx: on eliquis for DVT.  {[id number]}  Wound care note of 12/28-Recommend: 1.) sacral/buttock injury -Allevyn      Bilateral groins - InterdryAg after cleansing  2.) Incontinence Management - continue pericare BID, w/ assist of attends underpads and fecal management system as per protocol 3.) Maintain on an alternating air with low air loss surface 4.) Turn and reposition Q 2 hours 5.) Nutrition optimization w/ Moderate Malnutrition, Increased Nutrient Needs w/ TF, vit c, mvi, luz marina, & probiotics 6.) Hyperglycemia improving w/ glucerna TF and NPH insulin w/ FS & ISS 7.) Abx as per ID for cdiff, continue use of FMS as appropriate 8.) Offload heels/feet with complete cair air fluidized boots; ensure that the soles of the feet are not resting on the foot board of the bed. Care as per medicine, Will continue to follow with you Upon discharge f/u as outpatient at [facility] [address] [place] [address] [phone] s/w RN and d/w team Rosario Gongora PA-C, CWS 87609 I spent 25mnutes face to face w/ this pt of which more than 50% of the time was spent counseling & coordinating care of this pt. Electronic Signatures: Rosario Gongora (PA)     Respiratory: Patient instructed to use incentive spirometer [ ] YES [ X] NO              DVT ppx: [X] SQL-On Eliquis [ ] SQH and Venodynes [ ] Left [ ] Right [ ] Bilateral    Discharge Planning:  The patient was evaluated by PT/OT and recommended S. Acute Rehab.       More than 30 minutes spent on total encounter: more than 50% of the visit was spent on educating the patient and family regarding condition, medications, follow up plans, signs and symptoms to be concerned with, preparing paperwork, and questions answered regarding discharge.

## 2021-12-29 NOTE — PROGRESS NOTE ADULT - SUBJECTIVE AND OBJECTIVE BOX
SUBJECTIVE: Appears comfortable w/o complaints. NAD    OVERNIGHT EVENTS: None    Vital Signs Last 24 Hrs  T(C): 36.4 (29 Dec 2021 07:31), Max: 36.7 (29 Dec 2021 00:16)  T(F): 97.5 (29 Dec 2021 07:31), Max: 98.1 (29 Dec 2021 00:16)  HR: 88 (29 Dec 2021 07:31) (81 - 93)  BP: 134/85 (29 Dec 2021 07:31) (108/70 - 136/77)  BP(mean): --  RR: 18 (29 Dec 2021 07:31) (18 - 24)  SpO2: 100% (29 Dec 2021 07:31) (98% - 100%)  IVF: [ ] IVL [ ] NS+K@   DIET: [ ] Regular [ ] CCD [ ] Renal [ ] Puree [ ] Dysphagia [ ] Tube Feeds:   PCA: [ ] YES [ ] NO   JULIEN: [ ] YES [ ] NO [ ] VOID   BM: [ ] YES [ ] NO     DRAINS: [ ] VERITO (cc/24h) [ ] HMV (cc/24h)    PHYSICAL EXAM:    General: No Acute Distress     Neurological: Awake, alert oriented to person, place and time, Following Commands, PERRL, EOMI, Face Symmetrical, Speech Fluent, Moving all extremities, Muscle Strength normal in all four extremities, No Drift, Sensation to Light Touch Intact    Pulmonary: Clear to Auscultation, No Rales, No Rhonchi, No Wheezes     Cardiovascular: S1, S2, Regular Rate and Rhythm     Gastrointestinal: Soft, Nontender, Nondistended     Incision:     LABS:             12-28 @ 07:01  -  12-29 @ 07:00  --------------------------------------------------------  IN: 2185 mL / OUT: 1800 mL / NET: 385 mL      IMAGING:     MEDICATIONS  (STANDING):  acetylcysteine 20%  Inhalation 4 milliLiter(s) Inhalation every 6 hours  albuterol/ipratropium for Nebulization 3 milliLiter(s) Nebulizer every 6 hours  apixaban 5 milliGRAM(s) Oral two times a day  artificial  tears Solution 1 Drop(s) Both EYES every 6 hours  aspirin  chewable 81 milliGRAM(s) Oral daily  atorvastatin 40 milliGRAM(s) Oral at bedtime  chlorhexidine 0.12% Liquid 15 milliLiter(s) Oral Mucosa two times a day  dextrose 50% Injectable 25 Gram(s) IV Push once  dextrose 50% Injectable 12.5 Gram(s) IV Push once  doxazosin 2 milliGRAM(s) Oral daily  insulin lispro (ADMELOG) corrective regimen sliding scale   SubCutaneous every 6 hours  insulin NPH human recombinant 13 Unit(s) SubCutaneous every 6 hours  metoprolol tartrate 12.5 milliGRAM(s) Oral two times a day  multivitamin 1 Tablet(s) Oral daily  pantoprazole  Injectable 40 milliGRAM(s) IV Push daily  sodium chloride 3%  Inhalation 3 milliLiter(s) Inhalation every 6 hours  vancomycin    Solution 125 milliGRAM(s) Oral every 6 hours    MEDICATIONS  (PRN):  acetaminophen    Suspension .. 650 milliGRAM(s) Oral every 6 hours PRN Temp greater or equal to 38C (100.4F), Mild Pain (1 - 3)   SUBJECTIVE: Appears comfortable w/o complaints. NAD. Isolation due to C. Diff +    OVERNIGHT EVENTS: None    Vital Signs Last 24 Hrs  T(C): 36.4 (29 Dec 2021 07:31), Max: 36.7 (29 Dec 2021 00:16)  T(F): 97.5 (29 Dec 2021 07:31), Max: 98.1 (29 Dec 2021 00:16)  HR: 88 (29 Dec 2021 07:31) (81 - 93)  BP: 134/85 (29 Dec 2021 07:31) (108/70 - 136/77)  BP(mean): --  RR: 18 (29 Dec 2021 07:31) (18 - 24) Trach Collor on Sat 100%  SpO2: 100% (29 Dec 2021 07:31) (98% - 100%)  IVF: [ X] IVL [ ] NS+K@ Free water 200cc Q6h  DIET: [ ] Regular [ ] CCD [ ] Renal [ ] Puree [ ] Dysphagia [X ] Tube Feeds: PEG w Glucerna 250cc Bolus Q6h, Charly Supp and Probiotic Yogurt  PCA: [ ] YES [CX] NO   JULIEN: [ ] YES [X ] NO [ ] VOID Intermittent Cath  BM: [ X] YES [ ] NO Has Rectal tube 100cc    DRAINS: None    PHYSICAL EXAM:    General: No Acute Distress     Neurological:  Awake, eyes open with downward gaze,  trach collar, intermittently FC (squeezes hand b/l), UE 0/5, b/l LE wds    Pulmonary: Clear to Auscultation, No Rales, No Rhonchi, No Wheezes     Cardiovascular: S1, S2, Regular Rate and Rhythm     Gastrointestinal: Soft, Nontender, Nondistended     Incision: Healed    LABS:  Clostridium difficile Toxin by PCR (12.23.21 @ 09:00)    C Diff by PCR Result: Detected    COVID-19 PCR: NotDetec (23 Dec 2021 14:38)  COVID-19 PCR: NotDetec (19 Dec 2021 11:41)    12-28 @ 07:01  -  12-29 @ 07:00  --------------------------------------------------------  IN: 2185 mL / OUT: 1800 mL / NET: 385 mL    IMAGING:   < from: MR Head w/wo IV Cont (12.23.21 @ 23:50) >  IMPRESSION: Postop changes are again identified with extra axial   collection seen in the postoperative region. This could be compatible   with pseudomeningocele, though the possibility of abscess or dural leak   cannot entirely excluded. Clinical correlation and continued close   interval is recommended.    Small foci of abnormal restricted diffusion is seen involving the right   centrum semiovale and medial right frontal cortex. These findings are   likely compatible with acute lacunar infarcts.    < from: Xray Chest 1 View- PORTABLE-Urgent (Xray Chest 1 View- PORTABLE-Urgent .) (12.22.21 @ 22:31) >  Tracheostomy tube is seen.  Heart size is normal.  Clear lungs.  The visualized osseous structures demonstrate no acute pathology.   shunt catheter overlies RIGHT hemithorax.  IMPRESSION:  No acute radiographic cardiopulmonary pathology..    < from: CT Abdomen and Pelvis w/ IV Cont (12.16.21 @ 09:26) >  A 2.4 cm nodular soft tissue density in the bladder appears separate from   the prostate gland, concerning for bladder lesion. Correlate with   cystoscopy.    A 2.7 cm fluid collection adjacent to the right ischial tuberosity, may   be related to ischiogluteal bursitis.    Indeterminant 1.7 cm left renal lesion. Contrast-enhanced MRI can be   performed further evaluation.    Mild fatty infiltration along the PEG tube catheter without drainable   fluid collection.    < from: VA Duplex Lower Ext Vein Scan, Bilat (12.08.21 @ 12:21) >  Persistent bilateral below the knees deep venous thrombosis in the   bilateral calf veins. No propagation.    MEDICATIONS  (STANDING):  acetylcysteine 20%  Inhalation 4 milliLiter(s) Inhalation every 6 hours  albuterol/ipratropium for Nebulization 3 milliLiter(s) Nebulizer every 6 hours  apixaban 5 milliGRAM(s) Oral two times a day  artificial  tears Solution 1 Drop(s) Both EYES every 6 hours  aspirin  chewable 81 milliGRAM(s) Oral daily  atorvastatin 40 milliGRAM(s) Oral at bedtime  chlorhexidine 0.12% Liquid 15 milliLiter(s) Oral Mucosa two times a day  dextrose 50% Injectable 25 Gram(s) IV Push once  dextrose 50% Injectable 12.5 Gram(s) IV Push once  doxazosin 2 milliGRAM(s) Oral daily  insulin lispro (ADMELOG) corrective regimen sliding scale   SubCutaneous every 6 hours  insulin NPH human recombinant 13 Unit(s) SubCutaneous every 6 hours  metoprolol tartrate 12.5 milliGRAM(s) Oral two times a day  multivitamin 1 Tablet(s) Oral daily  pantoprazole  Injectable 40 milliGRAM(s) IV Push daily  sodium chloride 3%  Inhalation 3 milliLiter(s) Inhalation every 6 hours  vancomycin    Solution 125 milliGRAM(s) Oral every 6 hours    MEDICATIONS  (PRN):  acetaminophen    Suspension .. 650 milliGRAM(s) Oral every 6 hours PRN Temp greater or equal to 38C (100.4F), Mild Pain (1 - 3)

## 2021-12-29 NOTE — PROGRESS NOTE ADULT - SUBJECTIVE AND OBJECTIVE BOX
SUBJECTIVE:     OVERNIGHT EVENTS:     Vital Signs Last 24 Hrs  T(C): 36.4 (29 Dec 2021 07:31), Max: 36.7 (29 Dec 2021 00:16)  T(F): 97.5 (29 Dec 2021 07:31), Max: 98.1 (29 Dec 2021 00:16)  HR: 88 (29 Dec 2021 07:31) (81 - 93)  BP: 134/85 (29 Dec 2021 07:31) (108/70 - 136/77)  BP(mean): --  RR: 18 (29 Dec 2021 07:31) (18 - 24)  SpO2: 100% (29 Dec 2021 07:31) (98% - 100%)  IVF: [ ] IVL [ ] NS+K@   DIET: [ ] Regular [ ] CCD [ ] Renal [ ] Puree [ ] Dysphagia [ ] Tube Feeds:   PCA: [ ] YES [ ] NO   JULIEN: [ ] YES [ ] NO [ ] VOID   BM: [ ] YES [ ] NO     DRAINS: [ ] VERITO (cc/24h) [ ] HMV (cc/24h)    PHYSICAL EXAM:    General: No Acute Distress     Neurological: Awake, alert oriented to person, place and time, Following Commands, PERRL, EOMI, Face Symmetrical, Speech Fluent, Moving all extremities, Muscle Strength normal in all four extremities, No Drift, Sensation to Light Touch Intact    Pulmonary: Clear to Auscultation, No Rales, No Rhonchi, No Wheezes     Cardiovascular: S1, S2, Regular Rate and Rhythm     Gastrointestinal: Soft, Nontender, Nondistended     Incision:     LABS:             12-28 @ 07:01  -  12-29 @ 07:00  --------------------------------------------------------  IN: 2185 mL / OUT: 1800 mL / NET: 385 mL      IMAGING:     MEDICATIONS  (STANDING):  acetylcysteine 20%  Inhalation 4 milliLiter(s) Inhalation every 6 hours  albuterol/ipratropium for Nebulization 3 milliLiter(s) Nebulizer every 6 hours  apixaban 5 milliGRAM(s) Oral two times a day  artificial  tears Solution 1 Drop(s) Both EYES every 6 hours  aspirin  chewable 81 milliGRAM(s) Oral daily  atorvastatin 40 milliGRAM(s) Oral at bedtime  chlorhexidine 0.12% Liquid 15 milliLiter(s) Oral Mucosa two times a day  dextrose 50% Injectable 25 Gram(s) IV Push once  dextrose 50% Injectable 12.5 Gram(s) IV Push once  doxazosin 2 milliGRAM(s) Oral daily  insulin lispro (ADMELOG) corrective regimen sliding scale   SubCutaneous every 6 hours  insulin NPH human recombinant 13 Unit(s) SubCutaneous every 6 hours  metoprolol tartrate 12.5 milliGRAM(s) Oral two times a day  multivitamin 1 Tablet(s) Oral daily  pantoprazole  Injectable 40 milliGRAM(s) IV Push daily  sodium chloride 3%  Inhalation 3 milliLiter(s) Inhalation every 6 hours  vancomycin    Solution 125 milliGRAM(s) Oral every 6 hours    MEDICATIONS  (PRN):  acetaminophen    Suspension .. 650 milliGRAM(s) Oral every 6 hours PRN Temp greater or equal to 38C (100.4F), Mild Pain (1 - 3)

## 2021-12-29 NOTE — PROGRESS NOTE ADULT - ASSESSMENT
61 year old man with respiratory failure d/t ICH    1. ICH  -per neurosurgery  -s/p  shunt  -MRI done, as per neuro    2. Respiratory failure  -for tracheostomy, will require long term enteral nutrition  -s/p PEG, continue TF and use of abdominal binder  - aspiration precautions     3. Enterobacter PNA  -s/p course of antibiotics     4. Anemia, no overt GI bleed. EGD unremarkable.   -hgb stable  -monitor for GI bleeding     5. Cdiff infection   -on Vanco, 6 more days then slow taper as per ID  -continue banatrol in feeds  -100cc loose stools rectal tube drainage bag, improving    6. DVT on a/c  -apixaban 5mg resumed  -continue PPI       Attending supervision statement: I have personally seen and examined the patient. I fully participated in the care of this patient. I have made amendments to the documentation where necessary, and agree with the history, physical exam, and plan as outlined by the ACP.    Attending supervision statement: I have personally seen and examined the patient. I fully participated in the care of this patient. I have made amendments to the documentation where necessary, and agree with the history, physical exam, and plan as outlined by the ACP.        [facility]  Gastroenterology and Hepatology  [address] [place], [place]  Office: [phone]  Cell: [phone]

## 2021-12-29 NOTE — PROGRESS NOTE ADULT - SUBJECTIVE AND OBJECTIVE BOX
Chief Complaint:  Patient is a 61y old  Male who presents with a chief complaint of ICH (29 Dec 2021 08:45)      Date of service 12-29-21 @ 11:23      Interval Events:   Patient seen and examined. No acute overnight events.     Hospital Medications:  acetaminophen    Suspension .. 650 milliGRAM(s) Oral every 6 hours PRN  acetylcysteine 20%  Inhalation 4 milliLiter(s) Inhalation every 6 hours  albuterol/ipratropium for Nebulization 3 milliLiter(s) Nebulizer every 6 hours  apixaban 5 milliGRAM(s) Oral two times a day  artificial  tears Solution 1 Drop(s) Both EYES every 6 hours  aspirin  chewable 81 milliGRAM(s) Oral daily  atorvastatin 40 milliGRAM(s) Oral at bedtime  chlorhexidine 0.12% Liquid 15 milliLiter(s) Oral Mucosa two times a day  dextrose 50% Injectable 25 Gram(s) IV Push once  dextrose 50% Injectable 12.5 Gram(s) IV Push once  doxazosin 2 milliGRAM(s) Oral daily  insulin lispro (ADMELOG) corrective regimen sliding scale   SubCutaneous every 6 hours  insulin NPH human recombinant 13 Unit(s) SubCutaneous every 6 hours  metoprolol tartrate 12.5 milliGRAM(s) Oral two times a day  multivitamin 1 Tablet(s) Oral daily  pantoprazole  Injectable 40 milliGRAM(s) IV Push daily  sodium chloride 3%  Inhalation 3 milliLiter(s) Inhalation every 6 hours  vancomycin    Solution 125 milliGRAM(s) Oral every 6 hours        Review of Systems:  unable to obtain    PHYSICAL EXAM:   Vital Signs:  Vital Signs Last 24 Hrs  T(C): 36.4 (29 Dec 2021 07:31), Max: 36.7 (29 Dec 2021 00:16)  T(F): 97.5 (29 Dec 2021 07:31), Max: 98.1 (29 Dec 2021 00:16)  HR: 88 (29 Dec 2021 07:31) (81 - 93)  BP: 134/85 (29 Dec 2021 07:31) (108/70 - 136/77)  BP(mean): --  RR: 18 (29 Dec 2021 07:31) (18 - 24)  SpO2: 100% (29 Dec 2021 07:31) (98% - 100%)  Daily     Daily       PHYSICAL EXAM:     GENERAL:  Appears stated age, well-groomed, well-nourished, no distress  HEENT:  NC/AT,  conjunctivae anicteric, clear and pink,   NECK: supple, trachea midline, +trach  CHEST:  Full & symmetric excursion, no increased effort, breath sounds clear  HEART:  Regular rhythm, no JVD  ABDOMEN:  Soft, non-tender, non-distended, normoactive bowel sounds,  no masses , no hepatosplenomegaly, +peg  EXTREMITIES:  no cyanosis,clubbing or edema  SKIN:  No rash, erythema, or, ecchymoses, no jaundice  NEURO:  non-focal, no asterixis  RECTAL: Deferred      LABS Personally reviewed by me:                                          8.4    10.43 )-----------( 364      ( 27 Dec 2021 06:11 )             27.6       Imaging personally reviewed by me:

## 2021-12-30 LAB
APPEARANCE UR: ABNORMAL
BACTERIA # UR AUTO: ABNORMAL
BILIRUB UR-MCNC: NEGATIVE — SIGNIFICANT CHANGE UP
COLOR SPEC: YELLOW — SIGNIFICANT CHANGE UP
DIFF PNL FLD: ABNORMAL
EPI CELLS # UR: 0 /HPF — SIGNIFICANT CHANGE UP
GLUCOSE UR QL: NEGATIVE — SIGNIFICANT CHANGE UP
HYALINE CASTS # UR AUTO: 5 /LPF — HIGH (ref 0–2)
KETONES UR-MCNC: NEGATIVE — SIGNIFICANT CHANGE UP
LEUKOCYTE ESTERASE UR-ACNC: ABNORMAL
NITRITE UR-MCNC: NEGATIVE — SIGNIFICANT CHANGE UP
PH UR: 6.5 — SIGNIFICANT CHANGE UP (ref 5–8)
PROT UR-MCNC: ABNORMAL
RBC CASTS # UR COMP ASSIST: 11 /HPF — HIGH (ref 0–4)
SP GR SPEC: 1.02 — SIGNIFICANT CHANGE UP (ref 1.01–1.02)
UROBILINOGEN FLD QL: NEGATIVE — SIGNIFICANT CHANGE UP
WBC UR QL: 92 /HPF — HIGH (ref 0–5)

## 2021-12-30 PROCEDURE — 99232 SBSQ HOSP IP/OBS MODERATE 35: CPT

## 2021-12-30 RX ADMIN — Medication 125 MILLIGRAM(S): at 12:16

## 2021-12-30 RX ADMIN — ATORVASTATIN CALCIUM 40 MILLIGRAM(S): 80 TABLET, FILM COATED ORAL at 23:06

## 2021-12-30 RX ADMIN — Medication 12.5 MILLIGRAM(S): at 05:58

## 2021-12-30 RX ADMIN — Medication 4 MILLILITER(S): at 23:09

## 2021-12-30 RX ADMIN — Medication 2: at 12:16

## 2021-12-30 RX ADMIN — Medication 1 TABLET(S): at 12:17

## 2021-12-30 RX ADMIN — Medication 81 MILLIGRAM(S): at 12:18

## 2021-12-30 RX ADMIN — Medication 2 MILLIGRAM(S): at 05:59

## 2021-12-30 RX ADMIN — APIXABAN 5 MILLIGRAM(S): 2.5 TABLET, FILM COATED ORAL at 17:40

## 2021-12-30 RX ADMIN — CHLORHEXIDINE GLUCONATE 15 MILLILITER(S): 213 SOLUTION TOPICAL at 05:58

## 2021-12-30 RX ADMIN — Medication 1 DROP(S): at 23:05

## 2021-12-30 RX ADMIN — CHLORHEXIDINE GLUCONATE 15 MILLILITER(S): 213 SOLUTION TOPICAL at 18:06

## 2021-12-30 RX ADMIN — PANTOPRAZOLE SODIUM 40 MILLIGRAM(S): 20 TABLET, DELAYED RELEASE ORAL at 12:19

## 2021-12-30 RX ADMIN — SODIUM CHLORIDE 3 MILLILITER(S): 9 INJECTION INTRAMUSCULAR; INTRAVENOUS; SUBCUTANEOUS at 23:07

## 2021-12-30 RX ADMIN — Medication 1 DROP(S): at 05:58

## 2021-12-30 RX ADMIN — Medication 2: at 18:08

## 2021-12-30 RX ADMIN — Medication 4 MILLILITER(S): at 12:17

## 2021-12-30 RX ADMIN — HUMAN INSULIN 13 UNIT(S): 100 INJECTION, SUSPENSION SUBCUTANEOUS at 18:08

## 2021-12-30 RX ADMIN — HUMAN INSULIN 13 UNIT(S): 100 INJECTION, SUSPENSION SUBCUTANEOUS at 23:06

## 2021-12-30 RX ADMIN — SODIUM CHLORIDE 3 MILLILITER(S): 9 INJECTION INTRAMUSCULAR; INTRAVENOUS; SUBCUTANEOUS at 17:40

## 2021-12-30 RX ADMIN — Medication 4 MILLILITER(S): at 05:59

## 2021-12-30 RX ADMIN — Medication 1 DROP(S): at 12:17

## 2021-12-30 RX ADMIN — Medication 650 MILLIGRAM(S): at 18:08

## 2021-12-30 RX ADMIN — Medication 650 MILLIGRAM(S): at 18:38

## 2021-12-30 RX ADMIN — Medication 125 MILLIGRAM(S): at 05:58

## 2021-12-30 RX ADMIN — Medication 4 MILLILITER(S): at 18:07

## 2021-12-30 RX ADMIN — APIXABAN 5 MILLIGRAM(S): 2.5 TABLET, FILM COATED ORAL at 05:59

## 2021-12-30 RX ADMIN — Medication 125 MILLIGRAM(S): at 17:40

## 2021-12-30 RX ADMIN — Medication 12.5 MILLIGRAM(S): at 18:09

## 2021-12-30 RX ADMIN — Medication 125 MILLIGRAM(S): at 23:06

## 2021-12-30 RX ADMIN — Medication 3 MILLILITER(S): at 12:16

## 2021-12-30 RX ADMIN — HUMAN INSULIN 13 UNIT(S): 100 INJECTION, SUSPENSION SUBCUTANEOUS at 05:58

## 2021-12-30 RX ADMIN — Medication 3 MILLILITER(S): at 23:07

## 2021-12-30 RX ADMIN — SODIUM CHLORIDE 3 MILLILITER(S): 9 INJECTION INTRAMUSCULAR; INTRAVENOUS; SUBCUTANEOUS at 12:16

## 2021-12-30 RX ADMIN — Medication 1 DROP(S): at 18:06

## 2021-12-30 RX ADMIN — Medication 3 MILLILITER(S): at 18:07

## 2021-12-30 RX ADMIN — HUMAN INSULIN 13 UNIT(S): 100 INJECTION, SUSPENSION SUBCUTANEOUS at 12:15

## 2021-12-30 RX ADMIN — Medication 3 MILLILITER(S): at 05:59

## 2021-12-30 NOTE — PROGRESS NOTE ADULT - SUBJECTIVE AND OBJECTIVE BOX
CC: f/u for C diff    Patient reports: he is poorly interactive, not verbal    REVIEW OF SYSTEMS:  All other review of systems negative (Comprehensive ROS): limited, loose stool with fecal bag and he requires ISC    Antimicrobials Day #  :day   vancomycin    Solution 125 milliGRAM(s) Oral every 6 hours    Other Medications Reviewed  MEDICATIONS  (STANDING):  acetylcysteine 20%  Inhalation 4 milliLiter(s) Inhalation every 6 hours  albuterol/ipratropium for Nebulization 3 milliLiter(s) Nebulizer every 6 hours  apixaban 5 milliGRAM(s) Oral two times a day  artificial  tears Solution 1 Drop(s) Both EYES every 6 hours  aspirin  chewable 81 milliGRAM(s) Oral daily  atorvastatin 40 milliGRAM(s) Oral at bedtime  chlorhexidine 0.12% Liquid 15 milliLiter(s) Oral Mucosa two times a day  dextrose 50% Injectable 25 Gram(s) IV Push once  dextrose 50% Injectable 12.5 Gram(s) IV Push once  doxazosin 2 milliGRAM(s) Oral daily  insulin lispro (ADMELOG) corrective regimen sliding scale   SubCutaneous every 6 hours  insulin NPH human recombinant 13 Unit(s) SubCutaneous every 6 hours  metoprolol tartrate 12.5 milliGRAM(s) Oral two times a day  multivitamin 1 Tablet(s) Oral daily  pantoprazole  Injectable 40 milliGRAM(s) IV Push daily  sodium chloride 3%  Inhalation 3 milliLiter(s) Inhalation every 6 hours  vancomycin    Solution 125 milliGRAM(s) Oral every 6 hours    T(F): 98.7 (21 @ 11:51), Max: 98.9 (21 @ 04:34)  HR: 106 (21 @ 11:51)  BP: 116/71 (21 @ 11:51)  RR: 18 (21 @ 11:51)  SpO2: 97% (21 @ 11:51)  Wt(kg): --    PHYSICAL EXAM:  General: lethargic,no acute distress  Eyes:  anicteric, no conjunctival injection, no discharge  Oropharynx: no lesions or injection 	  Neck: supple, trach  Lungs: clear to auscultation  Heart: regular rate and rhythm; no murmur, rubs or gallops  Abdomen: soft, nondistended, nontender,peg  Skin: no lesions  Extremities: no clubbing, cyanosis, or edema  Neurologic: poorly interactive    LAB RESULTS:            Urinalysis Basic - ( 30 Dec 2021 06:21 )    Color: Yellow / Appearance: Slightly Turbid / S.016 / pH: x  Gluc: x / Ketone: Negative  / Bili: Negative / Urobili: Negative   Blood: x / Protein: 30 mg/dL / Nitrite: Negative   Leuk Esterase: Large / RBC: 11 /hpf / WBC 92 /HPF   Sq Epi: x / Non Sq Epi: 0 /hpf / Bacteria: Many      MICROBIOLOGY:  RECENT CULTURES:      RADIOLOGY REVIEWED:    < from: MR Head w/wo IV Cont (21 @ 23:50) >  IMPRESSION: Postop changes are again identified with extra axial   collection seen in the postoperative region. This could be compatible   with pseudomeningocele, though the possibility of abscess or dural leak   cannot entirely excluded. Clinical correlation and continued close   interval is recommended.    Small foci of abnormal restricted diffusion is seen involving the right   centrum semiovale and medial right frontal cortex. These findings are   likely compatible with acute lacunar infarcts.    --- End of Report ---    < end of copied text >

## 2021-12-30 NOTE — PROGRESS NOTE ADULT - SUBJECTIVE AND OBJECTIVE BOX
SUBJECTIVE: Appears comfortable w/o complaints. NAD. Isolation due to C. Diff +    OVERNIGHT EVENTS: None    Vital Signs Last 24 Hrs  T(C): 36.8 (30 Dec 2021 07:57), Max: 37.2 (30 Dec 2021 04:34)  T(F): 98.2 (30 Dec 2021 07:57), Max: 98.9 (30 Dec 2021 04:34)  HR: 81 (30 Dec 2021 07:57) (81 - 100)  BP: 110/71 (30 Dec 2021 07:57) (110/71 - 154/86)  BP(mean): --  RR: 18 (30 Dec 2021 07:57) (18 - 18)  SpO2: 100% (30 Dec 2021 07:57) (99% - 100%) Trach Collar  IVF: [ X] IVL [ ] NS+K@ Free water 200cc Q6h  DIET: [ ] Regular [ ] CCD [ ] Renal [ ] Puree [ ] Dysphagia [X ] Tube Feeds: PEG w Glucerna 250cc Bolus Q6h, Charly Supp and Probiotic Yogurt  PCA: [ ] YES [CX] NO   JULIEN: [ ] YES [X ] NO [ ] VOID Intermittent Cath  BM: [ X] YES [ ] NO Has Rectal tube 100cc brown to greenish liquid output    DRAINS: None    PHYSICAL EXAM:    General: No Acute Distress     Neurological:  Awake, eyes open with downward gaze,  trach collar, +FC-show thumbs up Rt hand weekly, and squeeze Lt hand weakly. b/l LE wds    Pulmonary: Clear to Auscultation, No Rales, No Rhonchi, No Wheezes     Cardiovascular: S1, S2, Regular Rate and Rhythm     Gastrointestinal: Soft, Nontender, Nondistended     Ext: B/L booths on    Incision: Healed    LABS:  Urinalysis + Microscopic Examination (12.30.21 @ 06:21)    Urine Appearance: Slightly Turbid    Urobilinogen: Negative    Specific Gravity: 1.016    Protein, Urine: 30 mg/dL    pH Urine: 6.5    Leukocyte Esterase Concentration: Large    Nitrite: Negative    Ketone - Urine: Negative    Bilirubin: Negative    Color: Yellow    Glucose Qualitative, Urine: Negative    Blood, Urine: Trace    Red Blood Cell - Urine: 11 /hpf    White Blood Cell - Urine: 92 /HPF    Epithelial Cells: 0 /hpf    Hyaline Casts: 5 /lpf    Bacteria: Many    Clostridium difficile Toxin by PCR (12.23.21 @ 09:00)    C Diff by PCR Result: Detected    COVID-19 PCR: NotDetec (29 Dec 2021 06:47)  COVID-19 PCR: NotDetec (23 Dec 2021 14:38)  COVID-19 PCR: NotDetec (19 Dec 2021 11:41)    I&O's Summary    29 Dec 2021 07:01  -  30 Dec 2021 07:00  --------------------------------------------------------  IN: 1885 mL / OUT: 1750 mL / NET: 135 mL    IMAGING:   < from: MR Head w/wo IV Cont (12.23.21 @ 23:50) >  IMPRESSION: Postop changes are again identified with extra axial   collection seen in the postoperative region. This could be compatible   with pseudomeningocele, though the possibility of abscess or dural leak   cannot entirely excluded. Clinical correlation and continued close   interval is recommended.    Small foci of abnormal restricted diffusion is seen involving the right   centrum semiovale and medial right frontal cortex. These findings are   likely compatible with acute lacunar infarcts.    < from: Xray Chest 1 View- PORTABLE-Urgent (Xray Chest 1 View- PORTABLE-Urgent .) (12.22.21 @ 22:31) >  Tracheostomy tube is seen.  Heart size is normal.  Clear lungs.  The visualized osseous structures demonstrate no acute pathology.   shunt catheter overlies RIGHT hemithorax.  IMPRESSION:  No acute radiographic cardiopulmonary pathology..    < from: CT Abdomen and Pelvis w/ IV Cont (12.16.21 @ 09:26) >  A 2.4 cm nodular soft tissue density in the bladder appears separate from   the prostate gland, concerning for bladder lesion. Correlate with   cystoscopy.    A 2.7 cm fluid collection adjacent to the right ischial tuberosity, may   be related to ischiogluteal bursitis.    Indeterminant 1.7 cm left renal lesion. Contrast-enhanced MRI can be   performed further evaluation.    Mild fatty infiltration along the PEG tube catheter without drainable   fluid collection.    < from: VA Duplex Lower Ext Vein Scan, Bilat (12.08.21 @ 12:21) >  Persistent bilateral below the knees deep venous thrombosis in the   bilateral calf veins. No propagation.    MEDICATIONS  (STANDING):  acetylcysteine 20%  Inhalation 4 milliLiter(s) Inhalation every 6 hours  albuterol/ipratropium for Nebulization 3 milliLiter(s) Nebulizer every 6 hours  apixaban 5 milliGRAM(s) Oral two times a day  artificial  tears Solution 1 Drop(s) Both EYES every 6 hours  aspirin  chewable 81 milliGRAM(s) Oral daily  atorvastatin 40 milliGRAM(s) Oral at bedtime  chlorhexidine 0.12% Liquid 15 milliLiter(s) Oral Mucosa two times a day  dextrose 50% Injectable 25 Gram(s) IV Push once  dextrose 50% Injectable 12.5 Gram(s) IV Push once  doxazosin 2 milliGRAM(s) Oral daily  insulin lispro (ADMELOG) corrective regimen sliding scale   SubCutaneous every 6 hours  insulin NPH human recombinant 13 Unit(s) SubCutaneous every 6 hours  metoprolol tartrate 12.5 milliGRAM(s) Oral two times a day  multivitamin 1 Tablet(s) Oral daily  pantoprazole  Injectable 40 milliGRAM(s) IV Push daily  sodium chloride 3%  Inhalation 3 milliLiter(s) Inhalation every 6 hours  vancomycin    Solution 125 milliGRAM(s) Oral every 6 hours    MEDICATIONS  (PRN):  acetaminophen    Suspension .. 650 milliGRAM(s) Oral every 6 hours PRN Temp greater or equal to 38C (100.4F), Mild Pain (1 - 3)

## 2021-12-30 NOTE — PROGRESS NOTE ADULT - ASSESSMENT
61M with PMH of HTN, CAD s/p stent on DAPT, T2DM who complained of headache and subsequently became unresponsive found to have posterior fossa hemorrhage, IVH, hydrocephalus, s/p EVD and SOC on 11/4. 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 Course c/b respiratory failure and dysphagia s/p trach/PEG (11/19), VPS (11/23), completed course of cefepime for enterobacter and pseudomonas pneumonia on 11/21, fevers, c. diff colitis, urinary retention, b/l distal DVT. New fevers again on 12/22 with diarrhea found to have recurrent c. diff infection on PO vanco.

## 2021-12-30 NOTE — PROGRESS NOTE ADULT - ASSESSMENT
61 year old man with respiratory failure d/t ICH    1. ICH  -per neurosurgery  -s/p  shunt  -MRI done, as per neuro    2. Respiratory failure  -for tracheostomy, will require long term enteral nutrition  -s/p PEG, continue TF and use of abdominal binder  - aspiration precautions     3. Enterobacter PNA  -s/p course of antibiotics     4. Anemia, no overt GI bleed. EGD unremarkable.   -hgb stable  -monitor for GI bleeding     5. Cdiff infection   -on Vanco, 5 more days then slow taper as per ID  -continue banatrol in feeds  -100cc loose stools rectal tube drainage bag, improving    6. DVT on a/c  -apixaban 5mg resumed  -continue PPI       Attending supervision statement: I have personally seen and examined the patient. I fully participated in the care of this patient. I have made amendments to the documentation where necessary, and agree with the history, physical exam, and plan as outlined by the ACP.    Attending supervision statement: I have personally seen and examined the patient. I fully participated in the care of this patient. I have made amendments to the documentation where necessary, and agree with the history, physical exam, and plan as outlined by the ACP.        [facility]  Gastroenterology and Hepatology  [address] [place], [place]  Office: [phone]  Cell: [phone]

## 2021-12-30 NOTE — PROGRESS NOTE ADULT - SUBJECTIVE AND OBJECTIVE BOX
[facility] Division of Hospital Medicine  Gaby Gaitan MD  Spectra: [phone]      Patient is a 61y old  Male who presents with a chief complaint of ICH (29 Dec 2021 08:45)      SUBJECTIVE / OVERNIGHT EVENTS: no acute events overnight. remains afebrile. rectal tube output improved. unable to assess ROS as patient nonverbal.  ADDITIONAL REVIEW OF SYSTEMS:    MEDICATIONS  (STANDING):  acetylcysteine 20%  Inhalation 4 milliLiter(s) Inhalation every 6 hours  albuterol/ipratropium for Nebulization 3 milliLiter(s) Nebulizer every 6 hours  apixaban 5 milliGRAM(s) Oral two times a day  artificial  tears Solution 1 Drop(s) Both EYES every 6 hours  aspirin  chewable 81 milliGRAM(s) Oral daily  atorvastatin 40 milliGRAM(s) Oral at bedtime  chlorhexidine 0.12% Liquid 15 milliLiter(s) Oral Mucosa two times a day  dextrose 50% Injectable 25 Gram(s) IV Push once  dextrose 50% Injectable 12.5 Gram(s) IV Push once  doxazosin 2 milliGRAM(s) Oral daily  insulin lispro (ADMELOG) corrective regimen sliding scale   SubCutaneous every 6 hours  insulin NPH human recombinant 13 Unit(s) SubCutaneous every 6 hours  metoprolol tartrate 12.5 milliGRAM(s) Oral two times a day  multivitamin 1 Tablet(s) Oral daily  pantoprazole  Injectable 40 milliGRAM(s) IV Push daily  sodium chloride 3%  Inhalation 3 milliLiter(s) Inhalation every 6 hours  vancomycin    Solution 125 milliGRAM(s) Oral every 6 hours    MEDICATIONS  (PRN):  acetaminophen    Suspension .. 650 milliGRAM(s) Oral every 6 hours PRN Temp greater or equal to 38C (100.4F), Mild Pain (1 - 3)      CAPILLARY BLOOD GLUCOSE      POCT Blood Glucose.: 133 mg/dL (30 Dec 2021 05:52)  POCT Blood Glucose.: 101 mg/dL (29 Dec 2021 23:23)  POCT Blood Glucose.: 162 mg/dL (29 Dec 2021 17:09)  POCT Blood Glucose.: 131 mg/dL (29 Dec 2021 12:06)    I&O's Summary    29 Dec 2021 07:01  -  30 Dec 2021 07:00  --------------------------------------------------------  IN: 1885 mL / OUT: 1750 mL / NET: 135 mL        PHYSICAL EXAM:  Vital Signs Last 24 Hrs  T(C): 36.8 (30 Dec 2021 07:57), Max: 37.2 (30 Dec 2021 04:34)  T(F): 98.2 (30 Dec 2021 07:57), Max: 98.9 (30 Dec 2021 04:34)  HR: 81 (30 Dec 2021 07:57) (81 - 100)  BP: 110/71 (30 Dec 2021 07:57) (110/71 - 154/86)  BP(mean): --  RR: 18 (30 Dec 2021 07:57) (18 - 18)  SpO2: 100% (30 Dec 2021 07:57) (99% - 100%)    CONSTITUTIONAL: NAD, well-developed, well-groomed  EYES: PERRLA; conjunctiva and sclera clear  ENMT: Moist oral mucosa, no pharyngeal injection or exudates; normal dentition  NECK: Supple, +trach site c/d/i  RESPIRATORY: Normal respiratory effort; lungs are clear to auscultation bilaterally  CARDIOVASCULAR: Regular rate and rhythm, normal S1 and S2, no murmur/rub/gallop; No lower extremity edema; Peripheral pulses are 2+ bilaterally  ABDOMEN: Soft, Nondistended, Nontender to palpation, normoactive bowel sounds, PEG site c/d/i, +rectal tube  MUSCULOSKELETAL:  No clubbing or cyanosis of digits; no joint swelling or tenderness to palpation  PSYCH: Alert, opens eyes to noxious stimuli, unable to assess orientation as nonverbal  NEUROLOGY: lethargic, not following commands, moving L fingers spontaneously  SKIN: No rashes; no palpable lesions    LABS:                Urinalysis Basic - ( 30 Dec 2021 06:21 )    Color: Yellow / Appearance: Slightly Turbid / S.016 / pH: x  Gluc: x / Ketone: Negative  / Bili: Negative / Urobili: Negative   Blood: x / Protein: 30 mg/dL / Nitrite: Negative   Leuk Esterase: Large / RBC: 11 /hpf / WBC 92 /HPF   Sq Epi: x / Non Sq Epi: 0 /hpf / Bacteria: Many          RADIOLOGY & ADDITIONAL TESTS:  Results Reviewed:   Imaging Personally Reviewed:  Electrocardiogram Personally Reviewed:    COORDINATION OF CARE:  Care Discussed with Consultants/Other Providers [Y]: neurosurgery QASIM Bell  Prior or Outpatient Records Reviewed [Y/N]:

## 2021-12-30 NOTE — PROGRESS NOTE ADULT - PROBLEM SELECTOR PLAN 11
CT a/p with a 2.4 cm nodular soft tissue density in the bladder appears separate from the prostate gland, concerning for bladder lesion  - as per urology - findings concerning for bladder tumor vs prostate gland. cysto transurethral resection would require general anesthesia. recommended checking urine cytology to determine next steps which returned negative. will still need outpatient urology follow-up regardless once acute issues improve  - PSA normal, urine cytology negative for malignancy  - previous hospitalist spoke to the patient's wife, Sandrita Bauer [phone]. Given the patient's underlying comorbid conditions coupled with his recent devastating neurologic events, other medical complications that ensued, and his poor neurologic and functional status at this time, it may be prudent to monitor his progress and see if he makes any meaningful recovery over the coming weeks to months to decide whether the benefit of pursuing any invasive work up for the bladder lesion outweighs the risk. Spouse seems to be in agreement at the moment.  - I discussed with Sandrita Bauer cytology results via phone, still will require eventual outpatient f/u with Urology in future after rehab which she verbalized understanding of

## 2021-12-30 NOTE — PROGRESS NOTE ADULT - SUBJECTIVE AND OBJECTIVE BOX
Chief Complaint:  Patient is a 61y old  Male who presents with a chief complaint of ICH (29 Dec 2021 08:45)      Date of service 21 @ 11:54      Interval Events:   Patient seen and examined. No acute overnight events.     Hospital Medications:  acetaminophen    Suspension .. 650 milliGRAM(s) Oral every 6 hours PRN  acetylcysteine 20%  Inhalation 4 milliLiter(s) Inhalation every 6 hours  albuterol/ipratropium for Nebulization 3 milliLiter(s) Nebulizer every 6 hours  apixaban 5 milliGRAM(s) Oral two times a day  artificial  tears Solution 1 Drop(s) Both EYES every 6 hours  aspirin  chewable 81 milliGRAM(s) Oral daily  atorvastatin 40 milliGRAM(s) Oral at bedtime  chlorhexidine 0.12% Liquid 15 milliLiter(s) Oral Mucosa two times a day  dextrose 50% Injectable 25 Gram(s) IV Push once  dextrose 50% Injectable 12.5 Gram(s) IV Push once  doxazosin 2 milliGRAM(s) Oral daily  insulin lispro (ADMELOG) corrective regimen sliding scale   SubCutaneous every 6 hours  insulin NPH human recombinant 13 Unit(s) SubCutaneous every 6 hours  metoprolol tartrate 12.5 milliGRAM(s) Oral two times a day  multivitamin 1 Tablet(s) Oral daily  pantoprazole  Injectable 40 milliGRAM(s) IV Push daily  sodium chloride 3%  Inhalation 3 milliLiter(s) Inhalation every 6 hours  vancomycin    Solution 125 milliGRAM(s) Oral every 6 hours        Review of Systems:  unable to obtain    PHYSICAL EXAM:   Vital Signs:  Vital Signs Last 24 Hrs  T(C): 37.1 (30 Dec 2021 11:51), Max: 37.2 (30 Dec 2021 04:34)  T(F): 98.7 (30 Dec 2021 11:51), Max: 98.9 (30 Dec 2021 04:34)  HR: 106 (30 Dec 2021 11:51) (81 - 106)  BP: 116/71 (30 Dec 2021 11:51) (110/71 - 142/87)  BP(mean): --  RR: 18 (30 Dec 2021 11:51) (18 - 18)  SpO2: 97% (30 Dec 2021 11:51) (97% - 100%)  Daily     Daily       PHYSICAL EXAM:     GENERAL:  Appears stated age, well-groomed, well-nourished, no distress  HEENT:  NC/AT,  conjunctivae anicteric, clear and pink,   NECK: supple, trachea midline  CHEST:  Full & symmetric excursion, no increased effort, breath sounds clear  HEART:  Regular rhythm, no JVD  ABDOMEN:  Soft, non-tender, non-distended, normoactive bowel sounds,  no masses , no hepatosplenomegaly  EXTREMITIES:  no cyanosis,clubbing or edema  SKIN:  No rash, erythema, or, ecchymoses, no jaundice  NEURO:  non-focal, no asterixis  RECTAL: Deferred      LABS Personally reviewed by me:                Urinalysis Basic - ( 30 Dec 2021 06:21 )    Color: Yellow / Appearance: Slightly Turbid / S.016 / pH: x  Gluc: x / Ketone: Negative  / Bili: Negative / Urobili: Negative   Blood: x / Protein: 30 mg/dL / Nitrite: Negative   Leuk Esterase: Large / RBC: 11 /hpf / WBC 92 /HPF   Sq Epi: x / Non Sq Epi: 0 /hpf / Bacteria: Many            Imaging personally reviewed by me:

## 2021-12-30 NOTE — PROGRESS NOTE ADULT - PROBLEM SELECTOR PLAN 1
new recurrent fever on 12/22, 12/23 with worsening diarrhea now resolved.  - CXR with clear lungs  - UA with pyuria, leuk est, urine cx negative  - blood cx negative  - found to have recurrent c. diff infection as below  - ID following, recs appreciated  - c/w PO vanco started 12/22 with plan for 14 days of therapy then month long taper.  - if re-spikes, may need to consider LP or tap of pseudomeningocele.

## 2021-12-30 NOTE — PROGRESS NOTE ADULT - ASSESSMENT
60 yo male with PMH of HTN   Admitted on 11/4 with severe headache and found to have cerebellar bleed.  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  S/p trach and peg.   He has distal DVT's - on apixaban.  He received cefepime 11/4-11/21 for respiratory coverage.  Developed watery C diff diarrhea on 11/26 - started on oral vancomycin   Then restarted on cefepime on 11/28 for concerns of pneumonia since had low grade temps. Giorgio neb added later  But CXR's remained clear.  Ct of brain showed residual blood.  Pseudomonas in sputum found to be resistant to carbapenems & quinolones. Sputum isolated strep pneumoniae as well    11/28 urine and blood cultures are negative.  Cefepime completed on 12/06 & Giorgio nebs on 12/07/21  Failed TOV & teixeira was replaced for retention of 1000 ml of urine on 12/06/21 PM  Spiked a fever to 102F ? in response to retention  UA with 3 WBCs, no nit or LE, large blood   CXR with clear lungs  CTH revealed a Pseudomeningocele, s/p tap with gram stain without organism  CSF finalized as no growth  Blood & urine cx also finalized as no growth  Completed treatment for C diff w 2 wks of oral vanco on 12/10/21  Diarrhea resolved  He developed recurrent fever and relapsed with C diff  Vanco restarted on 12/22  He has a rectal tube in place.  At high risk for aspiration and  infections given history of retention and poor mental status.  His fecal output has decreased  He still requires ISC  Suggest:  1. Vanco x 5 more days  than slow taper , 125 tid x 1 week, 125 BID x 1 week, 125 daily x 1 week, and 125 every other day x 2 weeks  2.I would hold off on additional antibiotics even if he has a positive urine culture, we would consider this colonization of bladder  2.disposition per NS and medicine  3.additional ID w/u as necessary

## 2021-12-30 NOTE — PROGRESS NOTE ADULT - PROBLEM SELECTOR PLAN 7
right soleal vein DVT and persistent left posterior tibial, peroneal, gastrocnemius and soleal vein DVT without proximal propagation  - started on Eliquis 12/2, monitor for bleed

## 2021-12-30 NOTE — PROGRESS NOTE ADULT - ASSESSMENT
61M hx HTN, DM, cardiac stent on ASA. At work complaining of HA ~8:30, starting feeling dizzy and vomiting, HTN/keke when EMS got to him. SBP 220s, HR 50s.     On EMS arrival at 09:39AM 11/4/21, patient seen talking a little, garbling, moving all extremities, then quickly became unresponsive. No known blood thinners.    CTH shows large posterior fossa bleed w/ IVH/SAH/hydro.    PROCEDURE: Adm 11/4 Suboccipital craniectomy with cervical laminectomy for decompression of medulla and spinal cord. Brain aneurysm surgery. Open tracheostomy.  shunt  POD# 56/55/51/50/37     PLAN:  Neuro: Isolation for C.Diff-on PO Vanco x 5more days then slow taper x 1mth-1wk TID, 1wk BID, 1wk QD and 2wk Q OD as d/w ID.  Trach Collar. intermitt St Cath. Rectal Tube. Na stable-cont Free water 200cc Q6h. Anemia trending downwards-ck CBC-P. 12/30 UA + as d/w ID no treatment but will see pt today-FU recomm. Cont to add Banatrol daily. +DVT on Eliquis.  Inc activity/OOB. DC to Rehab isolation bed when available.    ID note of 12/29-60 yo male with PMH of HTN. Admitted on 11/4 with severe headache and found to have cerebellar bleed. 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 S/p trach and peg.   He has distal DVT's - on apixaban. He received cefepime 11/4-11/21 for respiratory coverage. Developed watery C diff diarrhea on 11/26 - started on oral vancomycin Then restarted on cefepime on 11/28 for concerns of pneumonia since had low grade temps. Giorgio neb added later But CXR's remained clear. Ct of brain showed residual blood. Pseudomonas in sputum found to be resistant to carbapenems & quinolones. Sputum isolated strep pneumoniae as well  11/28 urine and blood cultures are negative. Cefepime completed on 12/06 & Giorgio nebs on 12/07/21. Failed TOV & teixeira was replaced for retention of 1000 ml of urine on 12/06/21 PM. Spiked a fever to 102F ? in response to retention. UA with 3 WBCs, no nit or LE, large blood CXR with clear lungs CTH revealed a Pseudomeningocele, s/p tap with gram stain without organism CSF finalized as no growth Blood & urine cx also finalized as no growth Completed treatment for C diff w 2 wks of oral vanco on 12/10/21 Diarrhea resolved He developed recurrent fever and relapsed with C diff Vanco restarted on 12/22 He has a rectal tube in place.  At high risk for aspiration and  infections given history of retention and poor mental status. His fecal output has decreased Suggest: 1. Vanco x 6 more days  than slow taper 2.disposition per NS and medicine 3.additional ID w/u as necessary Electronic Signatures:  Agapito Ivan)     GI note of 12/28-61 year old man with respiratory failure d/t ICH.  Respiratory failure -for tracheostomy, will require long term enteral nutrition -s/p PEG, continue TF and use of abdominal binder - aspiration precautions. 3. Enterobacter PNA -s/p course of antibiotics   4. Anemia, no overt GI bleed. EGD unremarkable. -hgb stable -monitor for GI bleeding 5. Cdiff infection -on Vanco -continue banatrol in feeds -75cc in rectal tube drainage bag, improving 6. DVT on a/c -apixaban 5mg resumed  -continue PPI. [facility] [address] [place], [place] Office: [phone] Cell: [phone] Electronic Signatures: Salbador Noel)   (Signed 28-Dec-2021 18:12) Co-Signer: Progress Note, Subjective and Objective, Assessment and Plan. Chanell Sanz (NP).     Hosp note of 12/28-{[id number]} Problem/Plan - 1: ·  Problem: Fever. ·  Plan: new recurrent fever on 12/22, 12/23 with worsening diarrhea now resolved. - CXR with clear lungs - UA with pyuria, leuk est, urine cx negative - blood cx negative - found to have recurrent c. 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 {[id number]} Problem/Plan - 2: ·  Problem: C. difficile diarrhea. ·  Plan: completed course of PO vanco previously. now with recurrent fever and diarrhea. - rectal tube re-inserted - infectious work-up as above - c/w PO vanco started on 12/22 with plan for 14 days of therapy and month long taper - GI PCR neg, C diff still positive - need to keep close eye on rectal tube output and try avoid volume depletion with IVF as needed. {[id number]} Problem/Plan - 3: ·  Problem: Anemia. ·  Plan: overall stable. generally is in 7-8 range with intermittent need for transfusion. - transfused 1u PRBC on 12/22 for Hb 6.8 with appropriate response - monitor Hb on CBC daily  - transfuse for Hb<7. {[id number]} Problem/Plan - 4: ·  Problem: IVH (intraventricular hemorrhage). ·  Plan: s/p VPS placement on 11/23. s/p PICA aneurysm resection. - management as per neurosurgery. {[id number]} Problem/Plan - 5: ·  Problem: Respiratory failure. ·  Plan: s/p trach on trach collar  trach secured per ENT on 12/15 - continue trach care and suctioning - monitor O2 sats - continue nebs. change to xopenex if tachycardia persists but overall improved. {[id number]} Problem/Plan - 6: ·  Problem: MICHELLE (acute kidney injury). ·  Plan: found to be in urinary retention requiring intermittent cath MICHELLE resolved - monitor urine output, renal function - c/w doxazosin. {[id number]} Problem/Plan - 7: ·  Problem: DVT of leg (deep venous thrombosis). ·  Plan: right soleal vein DVT and persistent left posterior tibial, peroneal, gastrocnemius and soleal vein DVT without proximal propagation on last duplex on 11/24 - started on Eliquis 12/2--> monitor for bleed. {[id number]} Problem/Plan - 8: ·  Problem: CAD (coronary artery disease). ·  Plan: s/p stent. was on DAPT per prescription refills  - resume ASA when clear from neurosurgery standpoint - c/w statin (transaminitis resolved) - patient was on metoprolol ER 25mg at home. continue metoprolol 12.5mg BID - GI noting afib with RVR? but no other documentation in chart found supporting that and patient seems to be in sinus rhythm.{[id number]} Problem/Plan - 9: ·  Problem: HTN (hypertension). ·  Plan: - off labetalol now - monitor BP. {[id number]} Problem/Plan - 10: ·  Problem: DM (diabetes mellitus). ·  Plan; HbA1c 6.5% - FS at goal - c/w NPH 13 units q6h - monitor FS q6h. {[id number]} Problem/Plan - 11: ·  Problem: Lesion of bladder. ·  Plan: CT a/p with a 2.4 cm nodular soft tissue density in the bladder appears separate from the prostate gland, concerning for bladder lesion previous hospitalist d/w urology - findings concerning for bladder tumor vs prostate gland PSA normal, urine cytology negative for malignancy previous hospitalist spoke to urology team regarding cysto transurethral resection which requires general anesthesia. Per , recommend to f/u urine cytology result to determine next steps - will need to f/u with urology once acute issues improve. Previous hospitalist spoke to the patient's wife, Sandrita Bauer [phone]. Given the patient's underlying comorbid conditions coupled with his recent devastating neurologic events, other medical complications that ensued, and his poor neurologic and functional status at this time, it may be prudent to monitor his progress and see if he makes any meaningful recovery over the coming weeks to months to decide whether the benefit of pursuing any invasive work up for the bladder lesion outweighs the risk. Spouse seems to be in agreement at the moment. {[id number]} Problem/Plan - 12: ·  Problem: Prophylactic measure. ·  Plan: DVT ppx: on eliquis for DVT.  {[id number]}  Wound care note of 12/28-Recommend: 1.) sacral/buttock injury -Allevyn      Bilateral groins - InterdryAg after cleansing  2.) Incontinence Management - continue pericare BID, w/ assist of attends underpads and fecal management system as per protocol 3.) Maintain on an alternating air with low air loss surface 4.) Turn and reposition Q 2 hours 5.) Nutrition optimization w/ Moderate Malnutrition, Increased Nutrient Needs w/ TF, vit c, mvi, luz marina, & probiotics 6.) Hyperglycemia improving w/ glucerna TF and NPH insulin w/ FS & ISS 7.) Abx as per ID for cdiff, continue use of FMS as appropriate 8.) Offload heels/feet with complete cair air fluidized boots; ensure that the soles of the feet are not resting on the foot board of the bed. Care as per medicine, Will continue to follow with you Upon [facility] [address] [place], [place] [phone] s/w RN and d/w team Rosario Gongora PA-C, CWS [id number] I spent 25mnutes face to face w/ this pt of which more than 50% of the time was spent counseling & coordinating care of this pt. Electronic Signatures: Rosario Gongora (PA)     Respiratory: Patient instructed to use incentive spirometer [ ] YES [ X] NO              DVT ppx: [X] SQL-On Eliquis [ ] SQH and Venodynes [ ] Left [ ] Right [ ] Bilateral    Discharge Planning:  The patient was evaluated by PT/OT and recommended S. Acute Rehab.       More than 30 minutes spent on total encounter: more than 50% of the visit was spent on educating the patient and family regarding condition, medications, follow up plans, signs and symptoms to be concerned with, preparing paperwork, and questions answered regarding discharge.

## 2021-12-30 NOTE — PROGRESS NOTE ADULT - PROBLEM SELECTOR PLAN 2
completed course of PO vanco previously.  now with recurrent fever and diarrhea. found to have recurrent c. diff infeciton.  - rectal tube re-inserted, likely can remove in coming days as output decreasing  - infectious work-up as above  - c/w PO vanco started on 12/22 with plan for 14 days of therapy and month long taper  - on day 15: vanco PO TID x 7d, PO BID x 7d, daily x 7 days, and QOD x 2 weeks as per ID recs  - would avoid abx therapy unless absolutely necessary (today's UA noted) as will exacerbate his c. diff

## 2021-12-30 NOTE — PROGRESS NOTE ADULT - NSPROGADDITIONALINFOA_GEN_ALL_CORE
.  Gaby Gaitan MD  Division of Hospital Medicine  [facility]   Spectra: [phone]    DC planning to rehab.    Plan discussed with wife Sandrita Bauer via phone and neurosurgery QASIM Bell.

## 2021-12-31 LAB
ANION GAP SERPL CALC-SCNC: 10 MMOL/L — SIGNIFICANT CHANGE UP (ref 5–17)
BLD GP AB SCN SERPL QL: NEGATIVE — SIGNIFICANT CHANGE UP
BUN SERPL-MCNC: 18 MG/DL — SIGNIFICANT CHANGE UP (ref 7–23)
CALCIUM SERPL-MCNC: 8.4 MG/DL — SIGNIFICANT CHANGE UP (ref 8.4–10.5)
CHLORIDE SERPL-SCNC: 96 MMOL/L — SIGNIFICANT CHANGE UP (ref 96–108)
CO2 SERPL-SCNC: 27 MMOL/L — SIGNIFICANT CHANGE UP (ref 22–31)
CREAT SERPL-MCNC: 0.55 MG/DL — SIGNIFICANT CHANGE UP (ref 0.5–1.3)
GLUCOSE SERPL-MCNC: 101 MG/DL — HIGH (ref 70–99)
HCT VFR BLD CALC: 20.4 % — CRITICAL LOW (ref 39–50)
HCT VFR BLD CALC: 24.7 % — LOW (ref 39–50)
HGB BLD-MCNC: 6.4 G/DL — CRITICAL LOW (ref 13–17)
HGB BLD-MCNC: 7.9 G/DL — LOW (ref 13–17)
MAGNESIUM SERPL-MCNC: 2 MG/DL — SIGNIFICANT CHANGE UP (ref 1.6–2.6)
MCHC RBC-ENTMCNC: 29 PG — SIGNIFICANT CHANGE UP (ref 27–34)
MCHC RBC-ENTMCNC: 29.7 PG — SIGNIFICANT CHANGE UP (ref 27–34)
MCHC RBC-ENTMCNC: 31.4 GM/DL — LOW (ref 32–36)
MCHC RBC-ENTMCNC: 32 GM/DL — SIGNIFICANT CHANGE UP (ref 32–36)
MCV RBC AUTO: 92.3 FL — SIGNIFICANT CHANGE UP (ref 80–100)
MCV RBC AUTO: 92.9 FL — SIGNIFICANT CHANGE UP (ref 80–100)
NRBC # BLD: 0 /100 WBCS — SIGNIFICANT CHANGE UP (ref 0–0)
NRBC # BLD: 0 /100 WBCS — SIGNIFICANT CHANGE UP (ref 0–0)
PHOSPHATE SERPL-MCNC: 3.2 MG/DL — SIGNIFICANT CHANGE UP (ref 2.5–4.5)
PLATELET # BLD AUTO: 386 K/UL — SIGNIFICANT CHANGE UP (ref 150–400)
PLATELET # BLD AUTO: 420 K/UL — HIGH (ref 150–400)
POTASSIUM SERPL-MCNC: 4.4 MMOL/L — SIGNIFICANT CHANGE UP (ref 3.5–5.3)
POTASSIUM SERPL-SCNC: 4.4 MMOL/L — SIGNIFICANT CHANGE UP (ref 3.5–5.3)
RBC # BLD: 2.21 M/UL — LOW (ref 4.2–5.8)
RBC # BLD: 2.66 M/UL — LOW (ref 4.2–5.8)
RBC # FLD: 13.1 % — SIGNIFICANT CHANGE UP (ref 10.3–14.5)
RBC # FLD: 13.2 % — SIGNIFICANT CHANGE UP (ref 10.3–14.5)
RH IG SCN BLD-IMP: POSITIVE — SIGNIFICANT CHANGE UP
SODIUM SERPL-SCNC: 133 MMOL/L — LOW (ref 135–145)
WBC # BLD: 13.54 K/UL — HIGH (ref 3.8–10.5)
WBC # BLD: 18.08 K/UL — HIGH (ref 3.8–10.5)
WBC # FLD AUTO: 13.54 K/UL — HIGH (ref 3.8–10.5)
WBC # FLD AUTO: 18.08 K/UL — HIGH (ref 3.8–10.5)

## 2021-12-31 PROCEDURE — 71045 X-RAY EXAM CHEST 1 VIEW: CPT | Mod: 26,76,59

## 2021-12-31 PROCEDURE — ZZZZZ: CPT

## 2021-12-31 PROCEDURE — 99233 SBSQ HOSP IP/OBS HIGH 50: CPT

## 2021-12-31 PROCEDURE — 99222 1ST HOSP IP/OBS MODERATE 55: CPT

## 2021-12-31 RX ORDER — SODIUM CHLORIDE 9 MG/ML
1000 INJECTION INTRAMUSCULAR; INTRAVENOUS; SUBCUTANEOUS ONCE
Refills: 0 | Status: COMPLETED | OUTPATIENT
Start: 2021-12-31 | End: 2021-12-31

## 2021-12-31 RX ORDER — DEXTROSE 50 % IN WATER 50 %
50 SYRINGE (ML) INTRAVENOUS ONCE
Refills: 0 | Status: COMPLETED | OUTPATIENT
Start: 2021-12-31 | End: 2021-12-31

## 2021-12-31 RX ORDER — SODIUM CHLORIDE 9 MG/ML
1000 INJECTION, SOLUTION INTRAVENOUS
Refills: 0 | Status: DISCONTINUED | OUTPATIENT
Start: 2021-12-31 | End: 2021-12-31

## 2021-12-31 RX ORDER — ACETAMINOPHEN 500 MG
1000 TABLET ORAL ONCE
Refills: 0 | Status: COMPLETED | OUTPATIENT
Start: 2021-12-31 | End: 2021-12-31

## 2021-12-31 RX ORDER — DEXTROSE 50 % IN WATER 50 %
12.5 SYRINGE (ML) INTRAVENOUS ONCE
Refills: 0 | Status: COMPLETED | OUTPATIENT
Start: 2021-12-31 | End: 2021-12-31

## 2021-12-31 RX ADMIN — Medication 125 MILLIGRAM(S): at 11:03

## 2021-12-31 RX ADMIN — CHLORHEXIDINE GLUCONATE 15 MILLILITER(S): 213 SOLUTION TOPICAL at 17:35

## 2021-12-31 RX ADMIN — SODIUM CHLORIDE 3 MILLILITER(S): 9 INJECTION INTRAMUSCULAR; INTRAVENOUS; SUBCUTANEOUS at 17:34

## 2021-12-31 RX ADMIN — Medication 1000 MILLIGRAM(S): at 04:57

## 2021-12-31 RX ADMIN — SODIUM CHLORIDE 3 MILLILITER(S): 9 INJECTION INTRAMUSCULAR; INTRAVENOUS; SUBCUTANEOUS at 05:39

## 2021-12-31 RX ADMIN — Medication 1 DROP(S): at 17:35

## 2021-12-31 RX ADMIN — Medication 3 MILLILITER(S): at 11:03

## 2021-12-31 RX ADMIN — Medication 4 MILLILITER(S): at 05:39

## 2021-12-31 RX ADMIN — Medication 2 MILLIGRAM(S): at 05:40

## 2021-12-31 RX ADMIN — Medication 12.5 GRAM(S): at 06:46

## 2021-12-31 RX ADMIN — Medication 2: at 17:33

## 2021-12-31 RX ADMIN — APIXABAN 5 MILLIGRAM(S): 2.5 TABLET, FILM COATED ORAL at 05:41

## 2021-12-31 RX ADMIN — Medication 4 MILLILITER(S): at 11:04

## 2021-12-31 RX ADMIN — CHLORHEXIDINE GLUCONATE 15 MILLILITER(S): 213 SOLUTION TOPICAL at 05:40

## 2021-12-31 RX ADMIN — SODIUM CHLORIDE 3 MILLILITER(S): 9 INJECTION INTRAMUSCULAR; INTRAVENOUS; SUBCUTANEOUS at 11:03

## 2021-12-31 RX ADMIN — SODIUM CHLORIDE 3 MILLILITER(S): 9 INJECTION INTRAMUSCULAR; INTRAVENOUS; SUBCUTANEOUS at 23:01

## 2021-12-31 RX ADMIN — Medication 650 MILLIGRAM(S): at 11:33

## 2021-12-31 RX ADMIN — Medication 1 DROP(S): at 23:01

## 2021-12-31 RX ADMIN — Medication 4 MILLILITER(S): at 17:34

## 2021-12-31 RX ADMIN — Medication 3 MILLILITER(S): at 17:34

## 2021-12-31 RX ADMIN — Medication 650 MILLIGRAM(S): at 11:03

## 2021-12-31 RX ADMIN — HUMAN INSULIN 13 UNIT(S): 100 INJECTION, SUSPENSION SUBCUTANEOUS at 23:09

## 2021-12-31 RX ADMIN — Medication 12.5 MILLIGRAM(S): at 05:54

## 2021-12-31 RX ADMIN — Medication 400 MILLIGRAM(S): at 03:48

## 2021-12-31 RX ADMIN — Medication 1 DROP(S): at 05:39

## 2021-12-31 RX ADMIN — PANTOPRAZOLE SODIUM 40 MILLIGRAM(S): 20 TABLET, DELAYED RELEASE ORAL at 11:03

## 2021-12-31 RX ADMIN — Medication 81 MILLIGRAM(S): at 11:04

## 2021-12-31 RX ADMIN — Medication 125 MILLIGRAM(S): at 23:02

## 2021-12-31 RX ADMIN — Medication 3 MILLILITER(S): at 05:39

## 2021-12-31 RX ADMIN — Medication 1 TABLET(S): at 11:04

## 2021-12-31 RX ADMIN — Medication 2: at 23:09

## 2021-12-31 RX ADMIN — Medication 3 MILLILITER(S): at 23:01

## 2021-12-31 RX ADMIN — ATORVASTATIN CALCIUM 40 MILLIGRAM(S): 80 TABLET, FILM COATED ORAL at 23:01

## 2021-12-31 RX ADMIN — SODIUM CHLORIDE 1000 MILLILITER(S): 9 INJECTION INTRAMUSCULAR; INTRAVENOUS; SUBCUTANEOUS at 03:48

## 2021-12-31 RX ADMIN — Medication 4 MILLILITER(S): at 23:02

## 2021-12-31 RX ADMIN — Medication 125 MILLIGRAM(S): at 05:39

## 2021-12-31 RX ADMIN — Medication 1 DROP(S): at 11:06

## 2021-12-31 RX ADMIN — Medication 12.5 MILLIGRAM(S): at 17:35

## 2021-12-31 RX ADMIN — Medication 125 MILLIGRAM(S): at 17:34

## 2021-12-31 RX ADMIN — HUMAN INSULIN 13 UNIT(S): 100 INJECTION, SUSPENSION SUBCUTANEOUS at 17:33

## 2021-12-31 RX ADMIN — APIXABAN 5 MILLIGRAM(S): 2.5 TABLET, FILM COATED ORAL at 17:34

## 2021-12-31 NOTE — PROGRESS NOTE ADULT - SUBJECTIVE AND OBJECTIVE BOX
SUBJECTIVE: Pt seen and examined, currently satting at 100% on trach collar, trach tube became dislodged overnight, CXR shows small left pneumothorax, repeat  CXR shows stable small left pneumothorax, evaluated by Gen Surg    OVERNIGHT EVENTS: trach became dislodged overnight, ENT called to replace, rectal tube leaking and was replaced overnight, also hypoglycemic overnight and started on D5 @60    Vital Signs Last 24 Hrs  T(C): 36.6 (31 Dec 2021 08:45), Max: 37.2 (30 Dec 2021 15:26)  T(F): 97.8 (31 Dec 2021 08:45), Max: 98.9 (30 Dec 2021 15:26)  HR: 112 (31 Dec 2021 08:45) (94 - 112)  BP: 126/81 (31 Dec 2021 08:45) (101/70 - 173/84)  BP(mean): --  RR: 20 (31 Dec 2021 08:45) (18 - 20)  SpO2: 97% (31 Dec 2021 08:45) (96% - 98%)    PHYSICAL EXAM:    General: No Acute Distress     Neurological: Awake, eyes open with downward gaze,  trach collar, intermittently FC (squeezes hand), UE 0/5, b/l LE wds    Pulmonary: Clear to Auscultation, No Rales, No Rhonchi, No Wheezes, + trach    Cardiovascular: S1, S2, Regular Rate and Rhythm     Gastrointestinal: Soft, Nontender, Nondistended, +PEG,  +rectal tube    Incision: healed     LABS:                        7.9    13.54 )-----------( 386      ( 31 Dec 2021 09:01 )             24.7    12-31    133<L>  |  96  |  18  ----------------------------<  101<H>  4.4   |  27  |  0.55    Ca    8.4      31 Dec 2021 07:09  Phos  3.2     12-31  Mg     2.0     12-31 12-30 @ 07:01  -  12-31 @ 07:00  --------------------------------------------------------  IN: 0 mL / OUT: 2700 mL / NET: -2700 mL      DRAINS: none    MEDICATIONS:  Antibiotics:  vancomycin    Solution 125 milliGRAM(s) Oral every 6 hours    Neuro:  acetaminophen    Suspension .. 650 milliGRAM(s) Oral every 6 hours PRN Temp greater or equal to 38C (100.4F), Mild Pain (1 - 3)    Cardiac:  doxazosin 2 milliGRAM(s) Oral daily  metoprolol tartrate 12.5 milliGRAM(s) Oral two times a day    Pulm:  acetylcysteine 20%  Inhalation 4 milliLiter(s) Inhalation every 6 hours  albuterol/ipratropium for Nebulization 3 milliLiter(s) Nebulizer every 6 hours  sodium chloride 3%  Inhalation 3 milliLiter(s) Inhalation every 6 hours    GI/:  pantoprazole  Injectable 40 milliGRAM(s) IV Push daily    Other:   apixaban 5 milliGRAM(s) Oral two times a day  artificial  tears Solution 1 Drop(s) Both EYES every 6 hours  aspirin  chewable 81 milliGRAM(s) Oral daily  atorvastatin 40 milliGRAM(s) Oral at bedtime  chlorhexidine 0.12% Liquid 15 milliLiter(s) Oral Mucosa two times a day  dextrose 5%. 1000 milliLiter(s) IV Continuous <Continuous>  dextrose 50% Injectable 25 Gram(s) IV Push once  dextrose 50% Injectable 12.5 Gram(s) IV Push once  insulin lispro (ADMELOG) corrective regimen sliding scale   SubCutaneous every 6 hours  insulin NPH human recombinant 13 Unit(s) SubCutaneous every 6 hours  multivitamin 1 Tablet(s) Oral daily    DIET: [] Regular [] CCD [] Renal [] Puree [] Dysphagia [x] Tube Feeds:     IMAGING:   < from: Xray Chest 1 View- PORTABLE-Urgent (Xray Chest 1 View- PORTABLE-Urgent .) (12.31.21 @ 07:32) >    INTERPRETATION:  stable small Left pneumothorax.  follow up official report.    < end of copied text >  < from: Xray Chest 1 View- PORTABLE-Urgent (Xray Chest 1 View- PORTABLE-Urgent .) (12.31.21 @ 04:52) >    INTERPRETATION:  new small Left pneumothorax.  discussed with Dr Kelley of neurosurgery.  follow up official report.    < end of copied text >   from: CT Abdomen and Pelvis w/ IV Cont (12.16.21 @ 09:26) >    IMPRESSION:  A 2.4 cm nodular soft tissue density in the bladder appears separate from   the prostate gland, concerning for bladder lesion. Correlate with   cystoscopy.    < from: MR Head w/wo IV Cont (12.23.21 @ 23:50) >    IMPRESSION: Postop changes are again identified with extra axial   collection seen in the postoperative region. This could be compatible   with pseudomeningocele, though the possibility of abscess or dural leak   cannot entirely excluded. Clinical correlation and continued close   interval is recommended.    Small foci of abnormal restricted diffusion is seen involving the right   centrum semiovale and medial right frontal cortex. These findings are   likely compatible with acute lacunar infarcts.    < end of copied text >  A 2.7 cm fluid collection adjacent to the right ischial tuberosity, may   be related to ischiogluteal bursitis.    Indeterminant 1.7 cm left renal lesion. Contrast-enhanced MRI can be   performed further evaluation.    Mild fatty infiltration along the PEG tube catheter without drainable   fluid collection.    < end of copied text >  < from: CT Head No Cont (12.12.21 @ 15:38) >  IMPRESSION:    Similar ventricular size when compared to 12/9/2021.    < end of copied text >  < from: CT Head No Cont (12.07.21 @ 11:42) >  IMPRESSION: Left suboccipital craniectomy and cerebellar postoperative changes. Right parietal  shunt catheter. Moderate enlargement of the ventricles compared with 11/26/2021..    < end of copied text >  < from: MR Head w/wo IV Cont (12.23.21 @ 23:50) >  IMPRESSION: Postop changes are again identified with extra axial   collection seen in the postoperative region. This could be compatible   with pseudomeningocele, though the possibility of abscess or dural leak   cannot entirely excluded. Clinical correlation and continued close   interval is recommended.    Small foci of abnormal restricted diffusion is seen involving the right   centrum semiovale and medial right frontal cortex. These findings are   likely compatible with acute lacunar infarcts.    --- End of Report ---    < end of copied text >  < from: VA Duplex Lower Ext Vein Scan, Bilat (12.08.21 @ 12:21) >    IMPRESSION:    Persistent bilateral below the knees deep venous thrombosis in the   bilateral calf veins. No propagation.    < end of copied text >

## 2021-12-31 NOTE — PROGRESS NOTE ADULT - ASSESSMENT
60 yo male with PMH of HTN   Admitted on 11/4 with severe headache and found to have cerebellar bleed.  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  S/p trach and peg.   He has distal DVT's - on apixaban.  He received cefepime 11/4-11/21 for respiratory coverage.  Developed watery C diff diarrhea on 11/26 - started on oral vancomycin   Then restarted on cefepime on 11/28 for concerns of pneumonia since had low grade temps. Giorgio neb added later  But CXR's remained clear.  Ct of brain showed residual blood.  Pseudomonas in sputum found to be resistant to carbapenems & quinolones. Sputum isolated strep pneumoniae as well    11/28 urine and blood cultures are negative.  Cefepime completed on 12/06 & Giorgio nebs on 12/07/21  Failed TOV & teixeira was replaced for retention of 1000 ml of urine on 12/06/21 PM  Spiked a fever to 102F ? in response to retention  UA with 3 WBCs, no nit or LE, large blood   CXR with clear lungs  CTH revealed a Pseudomeningocele, s/p tap with gram stain without organism  CSF finalized as no growth  Blood & urine cx also finalized as no growth  Completed treatment for C diff w 2 wks of oral vanco on 12/10/21  Diarrhea resolved  He developed recurrent fever and relapsed with C diff  Vanco restarted on 12/22  He has a rectal tube in place.  At high risk for aspiration and  infections given history of retention and poor mental status.  His fecal output has decreased  He still requires ISC  CXR 12/31 with small left PTX  pyuria acceptable with ISC  Suggest:  1. Vanco x 4 more days  than slow taper , 125 tid x 1 week, 125 BID x 1 week, 125 daily x 1 week, and 125 every other day x 2 weeks  2.I would hold off on additional antibiotics even if he has a positive urine culture, we would consider this colonization of bladder  2.disposition per NS and medicine  3.additional ID w/u as necessary

## 2021-12-31 NOTE — PROGRESS NOTE ADULT - PROBLEM SELECTOR PLAN 7
right soleal vein DVT and persistent left posterior tibial, peroneal, gastrocnemius and soleal vein DVT without proximal propagation  - started on Eliquis 12/2, monitor

## 2021-12-31 NOTE — CONSULT NOTE ADULT - ATTENDING COMMENTS
Patient seen and examined agree with above note as modified, where appropriate, by me. pt with small apical ptx, would continue observation.
Asymptomatic below the knee DVT  Continue DVT ppx for now  Repeat duplex WEDS  If propagates, then will consider IVC filter, prefer to avoid for now as it is below the knee      Aurea [id number]
seen and examined, somewhat short neck with vague pulsation   optimize for trach  will plan, discussed risks with wife.

## 2021-12-31 NOTE — PROGRESS NOTE ADULT - PROBLEM SELECTOR PLAN 2
completed course of PO vanco previously.  now with recurrent fever and diarrhea. found to have recurrent c. diff infeciton.  - rectal tube re-inserted, likely can remove in coming days as output decreasing  - infectious work-up as above  - c/w PO vanco started on 12/22 with plan for 14 days of therapy and month long taper  - on day 15: vanco PO TID x 7d, PO BID x 7d, daily x 7 days, and QOD x 2 weeks as per ID recs  - would avoid abx therapy unless absolutely necessary as will exacerbate his c. diff

## 2021-12-31 NOTE — PROGRESS NOTE ADULT - ASSESSMENT
61M with PMH of HTN, CAD s/p stent on DAPT, T2DM aw posterior fossa hemorrhage, IVH, hydrocephalus, s/p EVD and SOC on 11/4. 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     Course c/b respiratory failure and dysphagia s/p trach/PEG (11/19), VPS (11/23), completed course of cefepime for enterobacter and pseudomonas pneumonia on 11/21, fevers, c. diff colitis, urinary retention, b/l distal DVT. New fevers again on 12/22 with diarrhea found to have recurrent c. diff infection on PO vanco.    Trach dislodgement overnight, replaced by ENT. Small pneumo, stable to repeat XRays, no intervention per CT surgery. Respiratory status currently stable.

## 2021-12-31 NOTE — CONSULT NOTE ADULT - ASSESSMENT
61M admitted with ICH s/p PICA aneurysm resection c/b extended hospital course and poor mental status requiring trach/peg, DNR status, recently dislodged trach this AM found to have small L pneumothorax. Thoracic surgery consulted.    12/22 CXR no pneumothorax  today CXR x 2 stable    Recommendations:  -daily CXR to monitor  if change in respiratory status or vitals, would recommend stat CXR  -no acute thoracic surgery intervention at this time  -care per primary team    Discussed with Dr. Calvillo    THORACIC SURGERY  [id number]

## 2021-12-31 NOTE — PROGRESS NOTE ADULT - PROBLEM SELECTOR PLAN 10
HbA1c 6.5%. On NPH 13 units q6h. Hypoglycemic this am as feeds were held for dislodged trach.     Now resumed, monitor.

## 2021-12-31 NOTE — PROGRESS NOTE ADULT - PROBLEM SELECTOR PLAN 1
- Please advise trach needs to be flush against the skin to avoid dislodgement. Velcro trach tie should be tight enough to only fit 2 fingers between trach tie and neck. This will continue to happen if trach tie isn't tight enough.  - HOB elevation  - Suction PRN  - Continue trach care  - Care per primary team

## 2021-12-31 NOTE — PROGRESS NOTE ADULT - SUBJECTIVE AND OBJECTIVE BOX
CC: f/u for relapsed C Diff    Patient reports: he is not verbal and poorly interactive    REVIEW OF SYSTEMS:  All other review of systems negative (Comprehensive ROS): limited by condition    Antimicrobials Day #  :day 10/14  vancomycin    Solution 125 milliGRAM(s) Oral every 6 hours    Other Medications Reviewed  MEDICATIONS  (STANDING):  acetylcysteine 20%  Inhalation 4 milliLiter(s) Inhalation every 6 hours  albuterol/ipratropium for Nebulization 3 milliLiter(s) Nebulizer every 6 hours  apixaban 5 milliGRAM(s) Oral two times a day  artificial  tears Solution 1 Drop(s) Both EYES every 6 hours  aspirin  chewable 81 milliGRAM(s) Oral daily  atorvastatin 40 milliGRAM(s) Oral at bedtime  chlorhexidine 0.12% Liquid 15 milliLiter(s) Oral Mucosa two times a day  dextrose 5%. 1000 milliLiter(s) (60 mL/Hr) IV Continuous <Continuous>  dextrose 50% Injectable 25 Gram(s) IV Push once  dextrose 50% Injectable 12.5 Gram(s) IV Push once  doxazosin 2 milliGRAM(s) Oral daily  insulin lispro (ADMELOG) corrective regimen sliding scale   SubCutaneous every 6 hours  insulin NPH human recombinant 13 Unit(s) SubCutaneous every 6 hours  metoprolol tartrate 12.5 milliGRAM(s) Oral two times a day  multivitamin 1 Tablet(s) Oral daily  pantoprazole  Injectable 40 milliGRAM(s) IV Push daily  sodium chloride 3%  Inhalation 3 milliLiter(s) Inhalation every 6 hours  vancomycin    Solution 125 milliGRAM(s) Oral every 6 hours    T(F): 98.2 (21 @ 05:30), Max: 98.9 (21 @ 15:26)  HR: 100 (21 @ 05:30)  BP: 136/82 (21 @ 05:30)  RR: 18 (21 @ 05:30)  SpO2: 96% (21 @ 05:30)  Wt(kg): --    PHYSICAL EXAM:  General: lethargic, no acute distress  Eyes:  anicteric, no conjunctival injection, no discharge  Oropharynx: no lesions or injection 	  Neck: supple, trach  Lungs: scattered ronchi  Heart: regular rate and rhythm; no murmur, rubs or gallops  Abdomen: soft, nondistended, nontender, peg  Skin: no lesions  Extremities: no clubbing, cyanosis, or edema  Neurologic: poorly interactive    LAB RESULTS:        133<L>  |  96  |  18  ----------------------------<  101<H>  4.4   |  27  |  0.55    Ca    8.4      31 Dec 2021 07:09  Phos  3.2       Mg     2.0             Urinalysis Basic - ( 30 Dec 2021 06:21 )    Color: Yellow / Appearance: Slightly Turbid / S.016 / pH: x  Gluc: x / Ketone: Negative  / Bili: Negative / Urobili: Negative   Blood: x / Protein: 30 mg/dL / Nitrite: Negative   Leuk Esterase: Large / RBC: 11 /hpf / WBC 92 /HPF   Sq Epi: x / Non Sq Epi: 0 /hpf / Bacteria: Many      MICROBIOLOGY:  RECENT CULTURES:      RADIOLOGY REVIEWED:    < from: Xray Chest 1 View- PORTABLE-Urgent (Xray Chest 1 View- PORTABLE-Urgent .) (21 @ 04:52) >    INTERPRETATION:  new small Left pneumothorax.  discussed with Dr Kelley of neurosurgery.  follow up official report.    < end of copied text >

## 2021-12-31 NOTE — PROGRESS NOTE ADULT - PROBLEM SELECTOR PLAN 9
- off labetalol now and has been normotensive/low normal  - monitor BP - has been normotensive/low normal  - monitor BP

## 2021-12-31 NOTE — PROGRESS NOTE ADULT - SUBJECTIVE AND OBJECTIVE BOX
Patient is a 61y old  Male who presents with a chief complaint of IVH/SAH (31 Dec 2021 11:10)      SUBJECTIVE / OVERNIGHT EVENTS: Pt sleeping, not arousable.     MEDICATIONS  (STANDING):  acetylcysteine 20%  Inhalation 4 milliLiter(s) Inhalation every 6 hours  albuterol/ipratropium for Nebulization 3 milliLiter(s) Nebulizer every 6 hours  apixaban 5 milliGRAM(s) Oral two times a day  artificial  tears Solution 1 Drop(s) Both EYES every 6 hours  aspirin  chewable 81 milliGRAM(s) Oral daily  atorvastatin 40 milliGRAM(s) Oral at bedtime  chlorhexidine 0.12% Liquid 15 milliLiter(s) Oral Mucosa two times a day  dextrose 50% Injectable 25 Gram(s) IV Push once  dextrose 50% Injectable 12.5 Gram(s) IV Push once  doxazosin 2 milliGRAM(s) Oral daily  insulin lispro (ADMELOG) corrective regimen sliding scale   SubCutaneous every 6 hours  insulin NPH human recombinant 13 Unit(s) SubCutaneous every 6 hours  metoprolol tartrate 12.5 milliGRAM(s) Oral two times a day  multivitamin 1 Tablet(s) Oral daily  pantoprazole  Injectable 40 milliGRAM(s) IV Push daily  sodium chloride 3%  Inhalation 3 milliLiter(s) Inhalation every 6 hours  vancomycin    Solution 125 milliGRAM(s) Oral every 6 hours    MEDICATIONS  (PRN):  acetaminophen    Suspension .. 650 milliGRAM(s) Oral every 6 hours PRN Temp greater or equal to 38C (100.4F), Mild Pain (1 - 3)      CAPILLARY BLOOD GLUCOSE      POCT Blood Glucose.: 118 mg/dL (31 Dec 2021 11:50)  POCT Blood Glucose.: 85 mg/dL (31 Dec 2021 08:21)  POCT Blood Glucose.: 89 mg/dL (31 Dec 2021 07:19)  POCT Blood Glucose.: 95 mg/dL (31 Dec 2021 06:40)  POCT Blood Glucose.: 93 mg/dL (31 Dec 2021 06:24)  POCT Blood Glucose.: 59 mg/dL (31 Dec 2021 05:36)  POCT Blood Glucose.: 63 mg/dL (31 Dec 2021 05:33)  POCT Blood Glucose.: 120 mg/dL (30 Dec 2021 23:04)  POCT Blood Glucose.: 156 mg/dL (30 Dec 2021 17:39)    I&O's Summary    30 Dec 2021 07:01  -  31 Dec 2021 07:00  --------------------------------------------------------  IN: 0 mL / OUT: 2700 mL / NET: -2700 mL    31 Dec 2021 07:01  -  31 Dec 2021 14:16  --------------------------------------------------------  IN: 0 mL / OUT: 400 mL / NET: -400 mL        PHYSICAL EXAM:  T(C): 37 (21 @ 12:17), Max: 37.2 (21 @ 15:26)  HR: 101 (21 @ 12:17) (94 - 112)  BP: 107/69 (21 @ 12:17) (101/70 - 173/84)  RR: 20 (21 @ 12:17) (18 - 20)  SpO2: 100% (21 @ 12:17) (96% - 100%)  CONSTITUTIONAL: NAD, well-developed, well-groomed  EYES: PERRLA; conjunctiva and sclera clear  ENMT: Moist oral mucosa, no pharyngeal injection or exudates; normal dentition  NECK: Supple, no palpable masses; no thyromegaly  RESPIRATORY: Normal respiratory effort; good air entry anteriorly  CARDIOVASCULAR: Regular rate and rhythm, normal S1 and S2, no murmur/rub/gallop; No lower extremity edema; Peripheral pulses are 2+ bilaterally  ABDOMEN: Nontender to palpation, normoactive bowel sounds, no rebound/guarding; No hepatosplenomegaly  MUSCULOSKELETAL:  No clubbing or cyanosis of digits; no joint swelling or tenderness to palpation  PSYCH: Non verbal  NEUROLOGY: Unable to assess  SKIN: No rashes; no palpable lesions    LABS:                        7.9    13.54 )-----------( 386      ( 31 Dec 2021 09:01 )             24.7     12    133<L>  |  96  |  18  ----------------------------<  101<H>  4.4   |  27  |  0.55    Ca    8.4      31 Dec 2021 07:09  Phos  3.2       Mg     2.0                 Urinalysis Basic - ( 30 Dec 2021 06:21 )    Color: Yellow / Appearance: Slightly Turbid / S.016 / pH: x  Gluc: x / Ketone: Negative  / Bili: Negative / Urobili: Negative   Blood: x / Protein: 30 mg/dL / Nitrite: Negative   Leuk Esterase: Large / RBC: 11 /hpf / WBC 92 /HPF   Sq Epi: x / Non Sq Epi: 0 /hpf / Bacteria: Many        RADIOLOGY & ADDITIONAL TESTS:    Imaging Personally Reviewed:    Consultant(s) Notes Reviewed:      Care Discussed with Consultants/Other Providers: NSx

## 2021-12-31 NOTE — PROGRESS NOTE ADULT - PROBLEM SELECTOR PLAN 11
CT a/p with a 2.4 cm nodular soft tissue density in the bladder appears separate from the prostate gland, concerning for bladder lesion  - as per urology - findings concerning for bladder tumor vs prostate gland. cysto transurethral resection would require general anesthesia. recommended checking urine cytology to determine next steps which returned negative. will still need outpatient urology follow-up regardless once acute issues improve  - PSA normal, urine cytology negative for malignancy  - previous hospitalist spoke to the patient's wife, Sandrita Bauer [phone]. Given the patient's underlying comorbid conditions coupled with his recent devastating neurologic events, other medical complications that ensued, and his poor neurologic and functional status at this time, it may be prudent to monitor his progress and see if he makes any meaningful recovery over the coming weeks to months to decide whether the benefit of pursuing any invasive work up for the bladder lesion outweighs the risk. Spouse seems to be in agreement at the moment.  - Will require eventual outpatient f/u with Urology

## 2021-12-31 NOTE — PROGRESS NOTE ADULT - PROBLEM SELECTOR PLAN 8
s/p stent. was on DAPT per prescription refills   - ASA resumed  - c/w statin (transaminitis resolved)  - patient was on metoprolol ER 25mg at home. continue metoprolol 12.5mg BID

## 2021-12-31 NOTE — PROGRESS NOTE ADULT - ASSESSMENT
61M with PMH of HTN, CAD s/p stent on DAPT, T2DM who complained of headache and subsequently became unresponsive found to have posterior fossa hemorrhage, IVH, hydrocephalus, s/p EVD and SOC on 11/4. 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 Course c/b respiratory failure and dysphagia s/p trach/PEG (11/19), VPS (11/23),Certas @4 completed course of cefepime for enterobacter and pseudomonas pneumonia on 11/21, fevers, c. diff colitis, urinary retention, b/l distal DVT. Trach changed 12/15 to #7 cuffless due to dislodgement. 12/16 CT A/P wo concern for infectious process. but concern for bladder lesion, wife will pursue possible treatment after rehab. Now with another bout of cdiff found after fever workup, on PO vanco per ID, blood cultures negative to date. +Rectal tube. 12/31 Rectal tube replaced for leaking, Trach became dislodged and replaced by ENT. CXR shows small left pneumothorax. Repeat CXR shows stable small left pneumothorax (no intervention)    PLAN:  Neuro:   - shunt  Certas @4  - PICA aneurysm resection  - pt is DNR    Respiratory:   - trach replaced by ENT overnight for dislodgement  - CXR shows small left pneumothorax, repeat CXR is stable small left pneumothorax, evaluated by Gen Surg- no intervention at this time  - continue duoneb and mucomyst  - trach care, suction prn  - PE/DVT- on apixaban  CV:  - CAD - hx of stent- on ASA 81 mg   - acute post op blood loss anemia improved after transfusion PRBC 12/22  - HTN- continue metoprolol  - on doxazosin for urinary retention  Endocrine:   - DMT2- continue fingersticks with NPH 13u q6  Heme:  - H/H 6.4/20.4 this morning, repeated H/H is 7.9/24.7- will hold off on transfusion at this time, Type and Screen sent  DVT ppx:   - on eliquis for dvt  Renal:   - hypernatremia- Na 133, continue free water 200q6  - MICHELLE has resolved  -Urinary retention- requiring straight cath q6  - Urology following for renal lesion, PSA/Cytology pending, GOC necessary for any urologic intervention  - f/u urine cytology is negative for high grade urothelial carcinoma  ID:   - recurrent fever and diarrhea- cdiff again positive, PO vanco restarted (completed course earlier in stay)- ID recs appreciated  - MRI as above  - follow up CT scan of C/A/P to look for occult source of infection, found to have 2.4 cm nodular soft tissue density, concerning for bladder lesion, 1.7 cm left renal lesion  - CTX for pulmonary coverage if he deteriorates per ID  - c/w PO vanco started 12/22 with plan for 14 days of therapy then month long taper  - if spikes again, may need LP or 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  GI:    - tolerating tube feeds via PEG  - rectal tube in place  PT/OT:   - CHUYITA    Per Hospitalist note, it may be feasible at this time to monitor patient's progress for meaningful recovery over weeks, months and then decide whether there is benefit of pursuing workup of bladder lesion. Spouse in agreement.    Spectralink # [id number]

## 2021-12-31 NOTE — CONSULT NOTE ADULT - SUBJECTIVE AND OBJECTIVE BOX
General Surgery Consult  Consulting surgical team: THORACIC SURGERY  Consulting attending: Dr. Calvillo    HPI:  61M hx of HTN, CAD s/p stent on DAPT, DM2 admitted  with large ICH s/p EVD , PICA aneurysm resection  c/b poor mental status s/p trach/PEG , VPS  c/b pneumonia, C diff, recently had dislodged trach s/p replacement bedside by ENT  AM found to have small L pneumothorax, tolerating trach collar. Thoracic surgery consulted.  repeat CXR at 7AM shows stable small L pneumothorax      PAST MEDICAL HISTORY:  HTN (hypertension)    Diabetes mellitus        PAST SURGICAL HISTORY:  trach  PEG    MEDICATIONS:  acetaminophen    Suspension .. 650 milliGRAM(s) Oral every 6 hours PRN  acetylcysteine 20%  Inhalation 4 milliLiter(s) Inhalation every 6 hours  albuterol/ipratropium for Nebulization 3 milliLiter(s) Nebulizer every 6 hours  apixaban 5 milliGRAM(s) Oral two times a day  artificial  tears Solution 1 Drop(s) Both EYES every 6 hours  aspirin  chewable 81 milliGRAM(s) Oral daily  atorvastatin 40 milliGRAM(s) Oral at bedtime  chlorhexidine 0.12% Liquid 15 milliLiter(s) Oral Mucosa two times a day  dextrose 50% Injectable 25 Gram(s) IV Push once  dextrose 50% Injectable 12.5 Gram(s) IV Push once  doxazosin 2 milliGRAM(s) Oral daily  insulin lispro (ADMELOG) corrective regimen sliding scale   SubCutaneous every 6 hours  insulin NPH human recombinant 13 Unit(s) SubCutaneous every 6 hours  metoprolol tartrate 12.5 milliGRAM(s) Oral two times a day  multivitamin 1 Tablet(s) Oral daily  pantoprazole  Injectable 40 milliGRAM(s) IV Push daily  sodium chloride 3%  Inhalation 3 milliLiter(s) Inhalation every 6 hours  vancomycin    Solution 125 milliGRAM(s) Oral every 6 hours      ALLERGIES:  penicillin (Other)      VITALS & I/Os:  Vital Signs Last 24 Hrs  T(C): 37 (31 Dec 2021 12:17), Max: 37.2 (30 Dec 2021 15:26)  T(F): 98.6 (31 Dec 2021 12:17), Max: 98.9 (30 Dec 2021 15:26)  HR: 101 (31 Dec 2021 12:17) (94 - 112)  BP: 107/69 (31 Dec 2021 12:17) (101/70 - 173/84)  BP(mean): --  RR: 20 (31 Dec 2021 12:17) (18 - 20)  SpO2: 100% (31 Dec 2021 12:17) (96% - 100%)    I&O's Summary    30 Dec 2021 07:01  -  31 Dec 2021 07:00  --------------------------------------------------------  IN: 0 mL / OUT: 2700 mL / NET: -2700 mL    31 Dec 2021 07:01  -  31 Dec 2021 12:43  --------------------------------------------------------  IN: 0 mL / OUT: 400 mL / NET: -400 mL        PHYSICAL EXAM:  General: No acute distress  not following commands  Respiratory: Nonlabored  trach collar  no surgical scars noted on chest  Cardiovascular: appears well perfused  Abdominal: Soft, nondistended, nontender. No rebound or guarding. No organomegaly, no palpable mass. PEG in place  Extremities: Warm    LABS:                        7.9    13.54 )-----------( 386      ( 31 Dec 2021 09:01 )             24.7     12-31    133<L>  |  96  |  18  ----------------------------<  101<H>  4.4   |  27  |  0.55    Ca    8.4      31 Dec 2021 07:09  Phos  3.2     12-31  Mg     2.0     12-31      Lactate:              Urinalysis Basic - ( 30 Dec 2021 06:21 )    Color: Yellow / Appearance: Slightly Turbid / S.016 / pH: x  Gluc: x / Ketone: Negative  / Bili: Negative / Urobili: Negative   Blood: x / Protein: 30 mg/dL / Nitrite: Negative   Leuk Esterase: Large / RBC: 11 /hpf / WBC 92 /HPF   Sq Epi: x / Non Sq Epi: 0 /hpf / Bacteria: Many        IMAGING:

## 2021-12-31 NOTE — PROGRESS NOTE ADULT - PROBLEM SELECTOR PLAN 5
As above  - continue trach care and suctioning  - monitor O2 sats  - continue nebs. change to xopenex if tachycardia persists but overall improved

## 2021-12-31 NOTE — PROGRESS NOTE ADULT - SUBJECTIVE AND OBJECTIVE BOX
Chief Complaint:  Patient is a 61y old  Male who presents with a chief complaint of ICH (29 Dec 2021 08:45)      Interval Events:   Patient seen and examined. No acute overnight events.     Hospital Medications:  acetaminophen    Suspension .. 650 milliGRAM(s) Oral every 6 hours PRN  acetylcysteine 20%  Inhalation 4 milliLiter(s) Inhalation every 6 hours  albuterol/ipratropium for Nebulization 3 milliLiter(s) Nebulizer every 6 hours  apixaban 5 milliGRAM(s) Oral two times a day  artificial  tears Solution 1 Drop(s) Both EYES every 6 hours  aspirin  chewable 81 milliGRAM(s) Oral daily  atorvastatin 40 milliGRAM(s) Oral at bedtime  chlorhexidine 0.12% Liquid 15 milliLiter(s) Oral Mucosa two times a day  dextrose 50% Injectable 25 Gram(s) IV Push once  dextrose 50% Injectable 12.5 Gram(s) IV Push once  doxazosin 2 milliGRAM(s) Oral daily  insulin lispro (ADMELOG) corrective regimen sliding scale   SubCutaneous every 6 hours  insulin NPH human recombinant 13 Unit(s) SubCutaneous every 6 hours  metoprolol tartrate 12.5 milliGRAM(s) Oral two times a day  multivitamin 1 Tablet(s) Oral daily  pantoprazole  Injectable 40 milliGRAM(s) IV Push daily  sodium chloride 3%  Inhalation 3 milliLiter(s) Inhalation every 6 hours  vancomycin    Solution 125 milliGRAM(s) Oral every 6 hours        Review of Systems:  unable to obtain    PHYSICAL EXAM:   Vital Signs:  Vital Signs Last 24 Hrs  Vital Signs Last 24 Hrs  T(C): 37 (31 Dec 2021 12:17), Max: 37.2 (30 Dec 2021 15:26)  T(F): 98.6 (31 Dec 2021 12:17), Max: 98.9 (30 Dec 2021 15:26)  HR: 101 (31 Dec 2021 12:17) (94 - 112)  BP: 107/69 (31 Dec 2021 12:17) (101/70 - 173/84)  BP(mean): --  RR: 20 (31 Dec 2021 12:17) (18 - 20)  SpO2: 100% (31 Dec 2021 12:17) (96% - 100%)      PHYSICAL EXAM:     GENERAL:  Appears stated age, well-groomed, well-nourished, no distress  HEENT:  NC/AT,  conjunctivae anicteric, clear and pink,   NECK: supple, trachea midline  CHEST:  Full & symmetric excursion, no increased effort, breath sounds clear  HEART:  Regular rhythm, no JVD  ABDOMEN:  Soft, non-tender, non-distended, normoactive bowel sounds,  no masses , no hepatosplenomegaly  EXTREMITIES:  no cyanosis,clubbing or edema  SKIN:  No rash, erythema, or, ecchymoses, no jaundice  NEURO:  non-focal, no asterixis  RECTAL: Deferred      LABS Personally reviewed by me:                Urinalysis Basic - ( 30 Dec 2021 06:21 )    Color: Yellow / Appearance: Slightly Turbid / S.016 / pH: x  Gluc: x / Ketone: Negative  / Bili: Negative / Urobili: Negative   Blood: x / Protein: 30 mg/dL / Nitrite: Negative   Leuk Esterase: Large / RBC: 11 /hpf / WBC 92 /HPF   Sq Epi: x / Non Sq Epi: 0 /hpf / Bacteria: Many            Imaging personally reviewed by me:

## 2021-12-31 NOTE — PROGRESS NOTE ADULT - ASSESSMENT
61 year old man with respiratory failure d/t ICH    1. ICH  -per neurosurgery  -s/p  shunt  -MRI done, as per neuro    2. Respiratory failure  -for tracheostomy, will require long term enteral nutrition  -s/p PEG, continue TF and use of abdominal binder  - aspiration precautions     3. Enterobacter PNA  -s/p course of antibiotics     4. Anemia, no overt GI bleed. EGD unremarkable.   -hgb stable  -monitor for GI bleeding     5. Cdiff infection   -on vanco taper  -continue banatrol in feeds  -100cc loose stools rectal tube drainage bag, improving    6. DVT on a/c  -apixaban 5mg resumed  -continue PPI     7. Pneumothorax  -per CTS    Attending supervision statement: I have personally seen and examined the patient. I fully participated in the care of this patient. I have made amendments to the documentation where necessary, and agree with the history, physical exam, and plan as outlined by the ACP.    Attending supervision statement: I have personally seen and examined the patient. I fully participated in the care of this patient. I have made amendments to the documentation where necessary, and agree with the history, physical exam, and plan as outlined by the ACP.        [facility]  Gastroenterology and Hepatology  [address] [place], [place]  Office: [phone]  Cell: [phone]

## 2021-12-31 NOTE — PROGRESS NOTE ADULT - SUBJECTIVE AND OBJECTIVE BOX
ENT ISSUE/POD: trach eval    HPI: 60 yo male s/p trach, most recently changed to #7 portex uncuffed on 12/15 for trach dislodgement. ENT called back to see pt for trach eval. Team noting noisy/abnormal breath sounds with trach not flush against the skin. Saturing well at the bedside. Unable to obtain history from pt due to clinical condition.         PAST MEDICAL & SURGICAL HISTORY:  HTN (hypertension)    Diabetes mellitus      Allergies    penicillin (Other)    Intolerances      MEDICATIONS  (STANDING):  acetylcysteine 20%  Inhalation 4 milliLiter(s) Inhalation every 6 hours  albuterol/ipratropium for Nebulization 3 milliLiter(s) Nebulizer every 6 hours  apixaban 5 milliGRAM(s) Oral two times a day  artificial  tears Solution 1 Drop(s) Both EYES every 6 hours  aspirin  chewable 81 milliGRAM(s) Oral daily  atorvastatin 40 milliGRAM(s) Oral at bedtime  chlorhexidine 0.12% Liquid 15 milliLiter(s) Oral Mucosa two times a day  dextrose 50% Injectable 25 Gram(s) IV Push once  dextrose 50% Injectable 12.5 Gram(s) IV Push once  doxazosin 2 milliGRAM(s) Oral daily  insulin lispro (ADMELOG) corrective regimen sliding scale   SubCutaneous every 6 hours  insulin NPH human recombinant 13 Unit(s) SubCutaneous every 6 hours  metoprolol tartrate 12.5 milliGRAM(s) Oral two times a day  multivitamin 1 Tablet(s) Oral daily  pantoprazole  Injectable 40 milliGRAM(s) IV Push daily  sodium chloride 3%  Inhalation 3 milliLiter(s) Inhalation every 6 hours  vancomycin    Solution 125 milliGRAM(s) Oral every 6 hours    MEDICATIONS  (PRN):  acetaminophen    Suspension .. 650 milliGRAM(s) Oral every 6 hours PRN Temp greater or equal to 38C (100.4F), Mild Pain (1 - 3)      Social History: see consult    Family history: see consult    ROS:   uto      Vital Signs Last 24 Hrs  T(C): 36.8 (30 Dec 2021 23:10), Max: 37.2 (30 Dec 2021 15:26)  T(F): 98.2 (30 Dec 2021 23:10), Max: 98.9 (30 Dec 2021 15:26)  HR: 99 (30 Dec 2021 23:10) (81 - 108)  BP: 129/88 (30 Dec 2021 23:10) (101/70 - 131/69)  BP(mean): --  RR: 18 (30 Dec 2021 23:10) (18 - 18)  SpO2: 98% (30 Dec 2021 23:10) (97% - 100%)              PHYSICAL EXAM:  Gen: NAD  Skin: No rashes, bruises, or lesions  Head: Normocephalic, Atraumatic  Face: no edema, erythema, or fluctuance. Parotid glands soft without mass  Eyes: no scleral injection  Nose: Nares bilaterally patent, no discharge  Mouth: No Drooling / Trismus.  Mucosa moist, tongue/uvula midline, oropharynx clear  Neck: #7 uncuffed portex not in proper position, partially out of stoma. Flat, supple, no lymphadenopathy, trachea midline, no masses  Lymphatic: No lymphadenopathy  Resp: breathing easily, +snoring  Neuro: unable to evaluate      Procedure: Trach change  Risks and benefits discussed with pt. Then, pt was placed in a supine position with neck extended. #7 portex uncuffed trach tube was removed and replaced with a new #7 portex uncuffed. No bleeding. Clear secretions suctioned from stoma. Bedside tracheoscopy performed, david visualized, no purulence, no erythema, no evidence of tracheomalacia. Pt tolerated the procedure well without complications.

## 2021-12-31 NOTE — PROGRESS NOTE ADULT - ASSESSMENT
60 yo male s/p trach, most recently changed to #7 portex uncuffed on 12/15 for trach dislodgement. ENT called back to see pt for trach eval 2/2 abnormal breath sounds. On exam, trach not in proper position, partially hanging out of stoma, velcro tie very loose. Trach was changed to a new #7 uncuffed portex, tracheoscopy performed which showed trach in proper position, david visualized, no tracheomalacia.

## 2022-01-01 ENCOUNTER — INPATIENT (INPATIENT)
Facility: HOSPITAL | Age: 62
LOS: 9 days | Discharge: HOME | End: 2022-10-13
Attending: STUDENT IN AN ORGANIZED HEALTH CARE EDUCATION/TRAINING PROGRAM | Admitting: STUDENT IN AN ORGANIZED HEALTH CARE EDUCATION/TRAINING PROGRAM

## 2022-01-01 ENCOUNTER — TRANSCRIPTION ENCOUNTER (OUTPATIENT)
Age: 62
End: 2022-01-01

## 2022-01-01 VITALS
DIASTOLIC BLOOD PRESSURE: 80 MMHG | TEMPERATURE: 98 F | HEART RATE: 91 BPM | RESPIRATION RATE: 18 BRPM | OXYGEN SATURATION: 99 % | HEIGHT: 70 IN | SYSTOLIC BLOOD PRESSURE: 138 MMHG

## 2022-01-01 VITALS
DIASTOLIC BLOOD PRESSURE: 68 MMHG | HEART RATE: 78 BPM | SYSTOLIC BLOOD PRESSURE: 112 MMHG | RESPIRATION RATE: 17 BRPM | OXYGEN SATURATION: 100 %

## 2022-01-01 DIAGNOSIS — Z88.0 ALLERGY STATUS TO PENICILLIN: ICD-10-CM

## 2022-01-01 DIAGNOSIS — Z93.1 GASTROSTOMY STATUS: ICD-10-CM

## 2022-01-01 DIAGNOSIS — E11.9 TYPE 2 DIABETES MELLITUS WITHOUT COMPLICATIONS: ICD-10-CM

## 2022-01-01 DIAGNOSIS — E87.0 HYPEROSMOLALITY AND HYPERNATREMIA: ICD-10-CM

## 2022-01-01 DIAGNOSIS — Z99.11 DEPENDENCE ON RESPIRATOR [VENTILATOR] STATUS: ICD-10-CM

## 2022-01-01 DIAGNOSIS — N17.9 ACUTE KIDNEY FAILURE, UNSPECIFIED: ICD-10-CM

## 2022-01-01 DIAGNOSIS — Z79.82 LONG TERM (CURRENT) USE OF ASPIRIN: ICD-10-CM

## 2022-01-01 DIAGNOSIS — E87.6 HYPOKALEMIA: ICD-10-CM

## 2022-01-01 DIAGNOSIS — N13.6 PYONEPHROSIS: ICD-10-CM

## 2022-01-01 DIAGNOSIS — Z79.899 OTHER LONG TERM (CURRENT) DRUG THERAPY: ICD-10-CM

## 2022-01-01 DIAGNOSIS — I25.10 ATHEROSCLEROTIC HEART DISEASE OF NATIVE CORONARY ARTERY WITHOUT ANGINA PECTORIS: ICD-10-CM

## 2022-01-01 DIAGNOSIS — J96.11 CHRONIC RESPIRATORY FAILURE WITH HYPOXIA: ICD-10-CM

## 2022-01-01 DIAGNOSIS — Z93.0 TRACHEOSTOMY STATUS: ICD-10-CM

## 2022-01-01 DIAGNOSIS — L89.154 PRESSURE ULCER OF SACRAL REGION, STAGE 4: ICD-10-CM

## 2022-01-01 DIAGNOSIS — R09.89 OTHER SPECIFIED SYMPTOMS AND SIGNS INVOLVING THE CIRCULATORY AND RESPIRATORY SYSTEMS: ICD-10-CM

## 2022-01-01 DIAGNOSIS — R65.10 SYSTEMIC INFLAMMATORY RESPONSE SYNDROME (SIRS) OF NON-INFECTIOUS ORIGIN WITHOUT ACUTE ORGAN DYSFUNCTION: ICD-10-CM

## 2022-01-01 DIAGNOSIS — R33.9 RETENTION OF URINE, UNSPECIFIED: ICD-10-CM

## 2022-01-01 DIAGNOSIS — J90 PLEURAL EFFUSION, NOT ELSEWHERE CLASSIFIED: ICD-10-CM

## 2022-01-01 DIAGNOSIS — E78.5 HYPERLIPIDEMIA, UNSPECIFIED: ICD-10-CM

## 2022-01-01 DIAGNOSIS — J93.9 PNEUMOTHORAX, UNSPECIFIED: ICD-10-CM

## 2022-01-01 LAB
-  AMIKACIN: SIGNIFICANT CHANGE UP
-  AMIKACIN: SIGNIFICANT CHANGE UP
-  AMPICILLIN/SULBACTAM: SIGNIFICANT CHANGE UP
-  AZTREONAM: SIGNIFICANT CHANGE UP
-  CEFEPIME: SIGNIFICANT CHANGE UP
-  CEFEPIME: SIGNIFICANT CHANGE UP
-  CEFTAZIDIME/AVIBACTAM: SIGNIFICANT CHANGE UP
-  CEFTAZIDIME: SIGNIFICANT CHANGE UP
-  CEFTAZIDIME: SIGNIFICANT CHANGE UP
-  CEFTOLOZANE/TAZOBACTAM: SIGNIFICANT CHANGE UP
-  CIPROFLOXACIN: SIGNIFICANT CHANGE UP
-  CIPROFLOXACIN: SIGNIFICANT CHANGE UP
-  GENTAMICIN: SIGNIFICANT CHANGE UP
-  GENTAMICIN: SIGNIFICANT CHANGE UP
-  IMIPENEM: SIGNIFICANT CHANGE UP
-  IMIPENEM: SIGNIFICANT CHANGE UP
-  LEVOFLOXACIN: SIGNIFICANT CHANGE UP
-  MEROPENEM: SIGNIFICANT CHANGE UP
-  MEROPENEM: SIGNIFICANT CHANGE UP
-  PIPERACILLIN/TAZOBACTAM: SIGNIFICANT CHANGE UP
-  PIPERACILLIN/TAZOBACTAM: SIGNIFICANT CHANGE UP
-  TOBRAMYCIN: SIGNIFICANT CHANGE UP
-  TOBRAMYCIN: SIGNIFICANT CHANGE UP
-  TRIMETHOPRIM/SULFAMETHOXAZOLE: SIGNIFICANT CHANGE UP
-  TRIMETHOPRIM/SULFAMETHOXAZOLE: SIGNIFICANT CHANGE UP
24R-OH-CALCIDIOL SERPL-MCNC: 46 NG/ML — SIGNIFICANT CHANGE UP (ref 30–80)
A1C WITH ESTIMATED AVERAGE GLUCOSE RESULT: 5.7 % — HIGH (ref 4–5.6)
ABO RH CONFIRMATION: SIGNIFICANT CHANGE UP
ALBUMIN SERPL ELPH-MCNC: 2.2 G/DL — LOW (ref 3.5–5.2)
ALBUMIN SERPL ELPH-MCNC: 2.3 G/DL — LOW (ref 3.5–5.2)
ALBUMIN SERPL ELPH-MCNC: 2.4 G/DL — LOW (ref 3.5–5.2)
ALBUMIN SERPL ELPH-MCNC: 2.5 G/DL — LOW (ref 3.5–5.2)
ALBUMIN SERPL ELPH-MCNC: 2.5 G/DL — LOW (ref 3.5–5.2)
ALBUMIN SERPL ELPH-MCNC: 2.6 G/DL — LOW (ref 3.5–5.2)
ALBUMIN SERPL ELPH-MCNC: 2.7 G/DL — LOW (ref 3.5–5.2)
ALBUMIN SERPL ELPH-MCNC: 2.9 G/DL — LOW (ref 3.5–5.2)
ALP SERPL-CCNC: 104 U/L — SIGNIFICANT CHANGE UP (ref 30–115)
ALP SERPL-CCNC: 69 U/L — SIGNIFICANT CHANGE UP (ref 30–115)
ALP SERPL-CCNC: 71 U/L — SIGNIFICANT CHANGE UP (ref 30–115)
ALP SERPL-CCNC: 72 U/L — SIGNIFICANT CHANGE UP (ref 30–115)
ALP SERPL-CCNC: 74 U/L — SIGNIFICANT CHANGE UP (ref 30–115)
ALP SERPL-CCNC: 78 U/L — SIGNIFICANT CHANGE UP (ref 30–115)
ALP SERPL-CCNC: 81 U/L — SIGNIFICANT CHANGE UP (ref 30–115)
ALP SERPL-CCNC: 82 U/L — SIGNIFICANT CHANGE UP (ref 30–115)
ALP SERPL-CCNC: 89 U/L — SIGNIFICANT CHANGE UP (ref 30–115)
ALP SERPL-CCNC: 90 U/L — SIGNIFICANT CHANGE UP (ref 30–115)
ALP SERPL-CCNC: 90 U/L — SIGNIFICANT CHANGE UP (ref 30–115)
ALT FLD-CCNC: 10 U/L — SIGNIFICANT CHANGE UP (ref 0–41)
ALT FLD-CCNC: 11 U/L — SIGNIFICANT CHANGE UP (ref 0–41)
ALT FLD-CCNC: 14 U/L — SIGNIFICANT CHANGE UP (ref 0–41)
ALT FLD-CCNC: 16 U/L — SIGNIFICANT CHANGE UP (ref 0–41)
ALT FLD-CCNC: 20 U/L — SIGNIFICANT CHANGE UP (ref 0–41)
ALT FLD-CCNC: 6 U/L — SIGNIFICANT CHANGE UP (ref 0–41)
ALT FLD-CCNC: 7 U/L — SIGNIFICANT CHANGE UP (ref 0–41)
ALT FLD-CCNC: 7 U/L — SIGNIFICANT CHANGE UP (ref 0–41)
ALT FLD-CCNC: 8 U/L — SIGNIFICANT CHANGE UP (ref 0–41)
ALT FLD-CCNC: 9 U/L — SIGNIFICANT CHANGE UP (ref 0–41)
ALT FLD-CCNC: 9 U/L — SIGNIFICANT CHANGE UP (ref 0–41)
ANION GAP SERPL CALC-SCNC: 10 MMOL/L — SIGNIFICANT CHANGE UP (ref 7–14)
ANION GAP SERPL CALC-SCNC: 10 MMOL/L — SIGNIFICANT CHANGE UP (ref 7–14)
ANION GAP SERPL CALC-SCNC: 11 MMOL/L — SIGNIFICANT CHANGE UP (ref 7–14)
ANION GAP SERPL CALC-SCNC: 12 MMOL/L — SIGNIFICANT CHANGE UP (ref 7–14)
ANION GAP SERPL CALC-SCNC: 12 MMOL/L — SIGNIFICANT CHANGE UP (ref 7–14)
ANION GAP SERPL CALC-SCNC: 13 MMOL/L — SIGNIFICANT CHANGE UP (ref 7–14)
ANION GAP SERPL CALC-SCNC: 17 MMOL/L — HIGH (ref 7–14)
ANION GAP SERPL CALC-SCNC: 17 MMOL/L — HIGH (ref 7–14)
ANION GAP SERPL CALC-SCNC: 19 MMOL/L — HIGH (ref 7–14)
ANION GAP SERPL CALC-SCNC: 22 MMOL/L — HIGH (ref 7–14)
ANION GAP SERPL CALC-SCNC: 8 MMOL/L — SIGNIFICANT CHANGE UP (ref 7–14)
ANION GAP SERPL CALC-SCNC: 9 MMOL/L — SIGNIFICANT CHANGE UP (ref 7–14)
APPEARANCE UR: CLEAR — SIGNIFICANT CHANGE UP
APTT BLD: 25.4 SEC — LOW (ref 27–39.2)
AST SERPL-CCNC: 10 U/L — SIGNIFICANT CHANGE UP (ref 0–41)
AST SERPL-CCNC: 11 U/L — SIGNIFICANT CHANGE UP (ref 0–41)
AST SERPL-CCNC: 12 U/L — SIGNIFICANT CHANGE UP (ref 0–41)
AST SERPL-CCNC: 15 U/L — SIGNIFICANT CHANGE UP (ref 0–41)
AST SERPL-CCNC: 17 U/L — SIGNIFICANT CHANGE UP (ref 0–41)
AST SERPL-CCNC: 19 U/L — SIGNIFICANT CHANGE UP (ref 0–41)
AST SERPL-CCNC: 21 U/L — SIGNIFICANT CHANGE UP (ref 0–41)
AST SERPL-CCNC: 24 U/L — SIGNIFICANT CHANGE UP (ref 0–41)
AST SERPL-CCNC: 26 U/L — SIGNIFICANT CHANGE UP (ref 0–41)
AST SERPL-CCNC: 9 U/L — SIGNIFICANT CHANGE UP (ref 0–41)
AST SERPL-CCNC: 9 U/L — SIGNIFICANT CHANGE UP (ref 0–41)
BACTERIA # UR AUTO: NEGATIVE — SIGNIFICANT CHANGE UP
BASE EXCESS BLDV CALC-SCNC: -0.2 MMOL/L — SIGNIFICANT CHANGE UP (ref -2–3)
BASOPHILS # BLD AUTO: 0.02 K/UL — SIGNIFICANT CHANGE UP (ref 0–0.2)
BASOPHILS # BLD AUTO: 0.03 K/UL — SIGNIFICANT CHANGE UP (ref 0–0.2)
BASOPHILS NFR BLD AUTO: 0.2 % — SIGNIFICANT CHANGE UP (ref 0–1)
BASOPHILS NFR BLD AUTO: 0.3 % — SIGNIFICANT CHANGE UP (ref 0–1)
BILIRUB SERPL-MCNC: 0.2 MG/DL — SIGNIFICANT CHANGE UP (ref 0.2–1.2)
BILIRUB SERPL-MCNC: <0.2 MG/DL — SIGNIFICANT CHANGE UP (ref 0.2–1.2)
BILIRUB UR-MCNC: NEGATIVE — SIGNIFICANT CHANGE UP
BLD GP AB SCN SERPL QL: SIGNIFICANT CHANGE UP
BLD GP AB SCN SERPL QL: SIGNIFICANT CHANGE UP
BUN SERPL-MCNC: 101 MG/DL — CRITICAL HIGH (ref 10–20)
BUN SERPL-MCNC: 111 MG/DL — CRITICAL HIGH (ref 10–20)
BUN SERPL-MCNC: 129 MG/DL — CRITICAL HIGH (ref 10–20)
BUN SERPL-MCNC: 132 MG/DL — CRITICAL HIGH (ref 10–20)
BUN SERPL-MCNC: 21 MG/DL — HIGH (ref 10–20)
BUN SERPL-MCNC: 25 MG/DL — HIGH (ref 10–20)
BUN SERPL-MCNC: 25 MG/DL — HIGH (ref 10–20)
BUN SERPL-MCNC: 27 MG/DL — HIGH (ref 10–20)
BUN SERPL-MCNC: 27 MG/DL — HIGH (ref 10–20)
BUN SERPL-MCNC: 34 MG/DL — HIGH (ref 10–20)
BUN SERPL-MCNC: 53 MG/DL — HIGH (ref 10–20)
BUN SERPL-MCNC: 66 MG/DL — CRITICAL HIGH (ref 10–20)
BUN SERPL-MCNC: 80 MG/DL — CRITICAL HIGH (ref 10–20)
BUN SERPL-MCNC: 85 MG/DL — CRITICAL HIGH (ref 10–20)
CA-I SERPL-SCNC: 1.29 MMOL/L — SIGNIFICANT CHANGE UP (ref 1.15–1.33)
CALCIUM SERPL-MCNC: 8.3 MG/DL — LOW (ref 8.4–10.4)
CALCIUM SERPL-MCNC: 8.4 MG/DL — SIGNIFICANT CHANGE UP (ref 8.4–10.4)
CALCIUM SERPL-MCNC: 8.6 MG/DL — SIGNIFICANT CHANGE UP (ref 8.4–10.5)
CALCIUM SERPL-MCNC: 8.7 MG/DL — SIGNIFICANT CHANGE UP (ref 8.4–10.4)
CALCIUM SERPL-MCNC: 8.8 MG/DL — SIGNIFICANT CHANGE UP (ref 8.4–10.5)
CALCIUM SERPL-MCNC: 8.9 MG/DL — SIGNIFICANT CHANGE UP (ref 8.4–10.4)
CALCIUM SERPL-MCNC: 8.9 MG/DL — SIGNIFICANT CHANGE UP (ref 8.4–10.5)
CALCIUM SERPL-MCNC: 8.9 MG/DL — SIGNIFICANT CHANGE UP (ref 8.4–10.5)
CALCIUM SERPL-MCNC: 9 MG/DL — SIGNIFICANT CHANGE UP (ref 8.4–10.5)
CALCIUM SERPL-MCNC: 9 MG/DL — SIGNIFICANT CHANGE UP (ref 8.4–10.5)
CALCIUM SERPL-MCNC: 9.1 MG/DL — SIGNIFICANT CHANGE UP (ref 8.4–10.5)
CALCIUM SERPL-MCNC: 9.1 MG/DL — SIGNIFICANT CHANGE UP (ref 8.4–10.5)
CALCIUM SERPL-MCNC: 9.2 MG/DL — SIGNIFICANT CHANGE UP (ref 8.4–10.4)
CALCIUM SERPL-MCNC: 9.3 MG/DL — SIGNIFICANT CHANGE UP (ref 8.4–10.5)
CHLORIDE SERPL-SCNC: 100 MMOL/L — SIGNIFICANT CHANGE UP (ref 98–110)
CHLORIDE SERPL-SCNC: 104 MMOL/L — SIGNIFICANT CHANGE UP (ref 98–110)
CHLORIDE SERPL-SCNC: 105 MMOL/L — SIGNIFICANT CHANGE UP (ref 98–110)
CHLORIDE SERPL-SCNC: 106 MMOL/L — SIGNIFICANT CHANGE UP (ref 98–110)
CHLORIDE SERPL-SCNC: 106 MMOL/L — SIGNIFICANT CHANGE UP (ref 98–110)
CHLORIDE SERPL-SCNC: 107 MMOL/L — SIGNIFICANT CHANGE UP (ref 98–110)
CHLORIDE SERPL-SCNC: 108 MMOL/L — SIGNIFICANT CHANGE UP (ref 98–110)
CHLORIDE SERPL-SCNC: 109 MMOL/L — SIGNIFICANT CHANGE UP (ref 98–110)
CHLORIDE SERPL-SCNC: 110 MMOL/L — SIGNIFICANT CHANGE UP (ref 98–110)
CHLORIDE SERPL-SCNC: 110 MMOL/L — SIGNIFICANT CHANGE UP (ref 98–110)
CHLORIDE SERPL-SCNC: 112 MMOL/L — HIGH (ref 98–110)
CHLORIDE SERPL-SCNC: 116 MMOL/L — HIGH (ref 98–110)
CHLORIDE SERPL-SCNC: 117 MMOL/L — HIGH (ref 98–110)
CHLORIDE SERPL-SCNC: 121 MMOL/L — HIGH (ref 98–110)
CO2 SERPL-SCNC: 16 MMOL/L — LOW (ref 17–32)
CO2 SERPL-SCNC: 21 MMOL/L — SIGNIFICANT CHANGE UP (ref 17–32)
CO2 SERPL-SCNC: 22 MMOL/L — SIGNIFICANT CHANGE UP (ref 17–32)
CO2 SERPL-SCNC: 22 MMOL/L — SIGNIFICANT CHANGE UP (ref 17–32)
CO2 SERPL-SCNC: 23 MMOL/L — SIGNIFICANT CHANGE UP (ref 17–32)
CO2 SERPL-SCNC: 23 MMOL/L — SIGNIFICANT CHANGE UP (ref 17–32)
CO2 SERPL-SCNC: 24 MMOL/L — SIGNIFICANT CHANGE UP (ref 17–32)
CO2 SERPL-SCNC: 25 MMOL/L — SIGNIFICANT CHANGE UP (ref 17–32)
CO2 SERPL-SCNC: 25 MMOL/L — SIGNIFICANT CHANGE UP (ref 17–32)
CO2 SERPL-SCNC: 26 MMOL/L — SIGNIFICANT CHANGE UP (ref 17–32)
CO2 SERPL-SCNC: 28 MMOL/L — SIGNIFICANT CHANGE UP (ref 17–32)
COLOR SPEC: SIGNIFICANT CHANGE UP
CREAT ?TM UR-MCNC: 21 MG/DL — SIGNIFICANT CHANGE UP
CREAT SERPL-MCNC: 0.8 MG/DL — SIGNIFICANT CHANGE UP (ref 0.7–1.5)
CREAT SERPL-MCNC: 0.8 MG/DL — SIGNIFICANT CHANGE UP (ref 0.7–1.5)
CREAT SERPL-MCNC: 1.1 MG/DL — SIGNIFICANT CHANGE UP (ref 0.7–1.5)
CREAT SERPL-MCNC: 1.5 MG/DL — SIGNIFICANT CHANGE UP (ref 0.7–1.5)
CREAT SERPL-MCNC: 1.9 MG/DL — HIGH (ref 0.7–1.5)
CREAT SERPL-MCNC: 2 MG/DL — HIGH (ref 0.7–1.5)
CREAT SERPL-MCNC: 2.8 MG/DL — HIGH (ref 0.7–1.5)
CREAT SERPL-MCNC: 3.2 MG/DL — HIGH (ref 0.7–1.5)
CREAT SERPL-MCNC: 3.9 MG/DL — HIGH (ref 0.7–1.5)
CREAT SERPL-MCNC: 4.2 MG/DL — CRITICAL HIGH (ref 0.7–1.5)
CULTURE RESULTS: SIGNIFICANT CHANGE UP
DIFF PNL FLD: ABNORMAL
EGFR: 15 ML/MIN/1.73M2 — LOW
EGFR: 17 ML/MIN/1.73M2 — LOW
EGFR: 21 ML/MIN/1.73M2 — LOW
EGFR: 25 ML/MIN/1.73M2 — LOW
EGFR: 37 ML/MIN/1.73M2 — LOW
EGFR: 39 ML/MIN/1.73M2 — LOW
EGFR: 52 ML/MIN/1.73M2 — LOW
EGFR: 75 ML/MIN/1.73M2 — SIGNIFICANT CHANGE UP
EGFR: 99 ML/MIN/1.73M2 — SIGNIFICANT CHANGE UP
EGFR: 99 ML/MIN/1.73M2 — SIGNIFICANT CHANGE UP
EOSINOPHIL # BLD AUTO: 0.2 K/UL — SIGNIFICANT CHANGE UP (ref 0–0.7)
EOSINOPHIL # BLD AUTO: 0.27 K/UL — SIGNIFICANT CHANGE UP (ref 0–0.7)
EOSINOPHIL # BLD AUTO: 0.29 K/UL — SIGNIFICANT CHANGE UP (ref 0–0.7)
EOSINOPHIL # BLD AUTO: 0.46 K/UL — SIGNIFICANT CHANGE UP (ref 0–0.7)
EOSINOPHIL # BLD AUTO: 0.46 K/UL — SIGNIFICANT CHANGE UP (ref 0–0.7)
EOSINOPHIL # BLD AUTO: 0.47 K/UL — SIGNIFICANT CHANGE UP (ref 0–0.7)
EOSINOPHIL # BLD AUTO: 0.53 K/UL — SIGNIFICANT CHANGE UP (ref 0–0.7)
EOSINOPHIL # BLD AUTO: 0.57 K/UL — SIGNIFICANT CHANGE UP (ref 0–0.7)
EOSINOPHIL NFR BLD AUTO: 2 % — SIGNIFICANT CHANGE UP (ref 0–8)
EOSINOPHIL NFR BLD AUTO: 2.2 % — SIGNIFICANT CHANGE UP (ref 0–8)
EOSINOPHIL NFR BLD AUTO: 3.2 % — SIGNIFICANT CHANGE UP (ref 0–8)
EOSINOPHIL NFR BLD AUTO: 3.6 % — SIGNIFICANT CHANGE UP (ref 0–8)
EOSINOPHIL NFR BLD AUTO: 4.5 % — SIGNIFICANT CHANGE UP (ref 0–8)
EOSINOPHIL NFR BLD AUTO: 5.3 % — SIGNIFICANT CHANGE UP (ref 0–8)
EOSINOPHIL NFR BLD AUTO: 5.3 % — SIGNIFICANT CHANGE UP (ref 0–8)
EOSINOPHIL NFR BLD AUTO: 7.3 % — SIGNIFICANT CHANGE UP (ref 0–8)
EPI CELLS # UR: 7 /HPF — HIGH (ref 0–5)
ESTIMATED AVERAGE GLUCOSE: 117 MG/DL — HIGH (ref 68–114)
GAS PNL BLDV: 138 MMOL/L — SIGNIFICANT CHANGE UP (ref 136–145)
GAS PNL BLDV: SIGNIFICANT CHANGE UP
GLUCOSE BLDC GLUCOMTR-MCNC: 120 MG/DL — HIGH (ref 70–99)
GLUCOSE BLDC GLUCOMTR-MCNC: 123 MG/DL — HIGH (ref 70–99)
GLUCOSE BLDC GLUCOMTR-MCNC: 124 MG/DL — HIGH (ref 70–99)
GLUCOSE BLDC GLUCOMTR-MCNC: 124 MG/DL — HIGH (ref 70–99)
GLUCOSE BLDC GLUCOMTR-MCNC: 129 MG/DL — HIGH (ref 70–99)
GLUCOSE BLDC GLUCOMTR-MCNC: 130 MG/DL — HIGH (ref 70–99)
GLUCOSE BLDC GLUCOMTR-MCNC: 130 MG/DL — HIGH (ref 70–99)
GLUCOSE BLDC GLUCOMTR-MCNC: 135 MG/DL — HIGH (ref 70–99)
GLUCOSE BLDC GLUCOMTR-MCNC: 137 MG/DL — HIGH (ref 70–99)
GLUCOSE BLDC GLUCOMTR-MCNC: 137 MG/DL — HIGH (ref 70–99)
GLUCOSE BLDC GLUCOMTR-MCNC: 138 MG/DL — HIGH (ref 70–99)
GLUCOSE BLDC GLUCOMTR-MCNC: 139 MG/DL — HIGH (ref 70–99)
GLUCOSE BLDC GLUCOMTR-MCNC: 140 MG/DL — HIGH (ref 70–99)
GLUCOSE BLDC GLUCOMTR-MCNC: 142 MG/DL — HIGH (ref 70–99)
GLUCOSE BLDC GLUCOMTR-MCNC: 143 MG/DL — HIGH (ref 70–99)
GLUCOSE BLDC GLUCOMTR-MCNC: 143 MG/DL — HIGH (ref 70–99)
GLUCOSE BLDC GLUCOMTR-MCNC: 144 MG/DL — HIGH (ref 70–99)
GLUCOSE BLDC GLUCOMTR-MCNC: 145 MG/DL — HIGH (ref 70–99)
GLUCOSE BLDC GLUCOMTR-MCNC: 147 MG/DL — HIGH (ref 70–99)
GLUCOSE BLDC GLUCOMTR-MCNC: 148 MG/DL — HIGH (ref 70–99)
GLUCOSE BLDC GLUCOMTR-MCNC: 148 MG/DL — HIGH (ref 70–99)
GLUCOSE BLDC GLUCOMTR-MCNC: 149 MG/DL — HIGH (ref 70–99)
GLUCOSE BLDC GLUCOMTR-MCNC: 149 MG/DL — HIGH (ref 70–99)
GLUCOSE BLDC GLUCOMTR-MCNC: 157 MG/DL — HIGH (ref 70–99)
GLUCOSE BLDC GLUCOMTR-MCNC: 158 MG/DL — HIGH (ref 70–99)
GLUCOSE BLDC GLUCOMTR-MCNC: 161 MG/DL — HIGH (ref 70–99)
GLUCOSE BLDC GLUCOMTR-MCNC: 162 MG/DL — HIGH (ref 70–99)
GLUCOSE BLDC GLUCOMTR-MCNC: 171 MG/DL — HIGH (ref 70–99)
GLUCOSE BLDC GLUCOMTR-MCNC: 172 MG/DL — HIGH (ref 70–99)
GLUCOSE BLDC GLUCOMTR-MCNC: 172 MG/DL — HIGH (ref 70–99)
GLUCOSE BLDC GLUCOMTR-MCNC: 173 MG/DL — HIGH (ref 70–99)
GLUCOSE SERPL-MCNC: 120 MG/DL — HIGH (ref 70–99)
GLUCOSE SERPL-MCNC: 120 MG/DL — HIGH (ref 70–99)
GLUCOSE SERPL-MCNC: 121 MG/DL — HIGH (ref 70–99)
GLUCOSE SERPL-MCNC: 130 MG/DL — HIGH (ref 70–99)
GLUCOSE SERPL-MCNC: 133 MG/DL — HIGH (ref 70–99)
GLUCOSE SERPL-MCNC: 136 MG/DL — HIGH (ref 70–99)
GLUCOSE SERPL-MCNC: 140 MG/DL — HIGH (ref 70–99)
GLUCOSE SERPL-MCNC: 141 MG/DL — HIGH (ref 70–99)
GLUCOSE SERPL-MCNC: 144 MG/DL — HIGH (ref 70–99)
GLUCOSE SERPL-MCNC: 144 MG/DL — HIGH (ref 70–99)
GLUCOSE SERPL-MCNC: 145 MG/DL — HIGH (ref 70–99)
GLUCOSE SERPL-MCNC: 167 MG/DL — HIGH (ref 70–99)
GLUCOSE SERPL-MCNC: 169 MG/DL — HIGH (ref 70–99)
GLUCOSE SERPL-MCNC: 177 MG/DL — HIGH (ref 70–99)
GLUCOSE UR QL: NEGATIVE — SIGNIFICANT CHANGE UP
GRAM STN FLD: SIGNIFICANT CHANGE UP
HCO3 BLDV-SCNC: 25 MMOL/L — SIGNIFICANT CHANGE UP (ref 22–29)
HCT VFR BLD CALC: 21.9 % — LOW (ref 42–52)
HCT VFR BLD CALC: 22.1 % — LOW (ref 42–52)
HCT VFR BLD CALC: 23 % — LOW (ref 42–52)
HCT VFR BLD CALC: 23.7 % — LOW (ref 42–52)
HCT VFR BLD CALC: 23.7 % — LOW (ref 42–52)
HCT VFR BLD CALC: 24.5 % — LOW (ref 42–52)
HCT VFR BLD CALC: 24.6 % — LOW (ref 42–52)
HCT VFR BLD CALC: 24.6 % — LOW (ref 42–52)
HCT VFR BLD CALC: 24.7 % — LOW (ref 42–52)
HCT VFR BLD CALC: 24.8 % — LOW (ref 42–52)
HCT VFR BLD CALC: 25.9 % — LOW (ref 42–52)
HCT VFR BLDA CALC: 29 % — LOW (ref 39–51)
HGB BLD CALC-MCNC: 9.8 G/DL — LOW (ref 12.6–17.4)
HGB BLD-MCNC: 7.3 G/DL — LOW (ref 14–18)
HGB BLD-MCNC: 7.4 G/DL — LOW (ref 14–18)
HGB BLD-MCNC: 7.6 G/DL — LOW (ref 14–18)
HGB BLD-MCNC: 7.7 G/DL — LOW (ref 14–18)
HGB BLD-MCNC: 7.8 G/DL — LOW (ref 14–18)
HGB BLD-MCNC: 8 G/DL — LOW (ref 14–18)
HGB BLD-MCNC: 8.1 G/DL — LOW (ref 14–18)
HGB BLD-MCNC: 8.2 G/DL — LOW (ref 14–18)
HYALINE CASTS # UR AUTO: 1 /LPF — SIGNIFICANT CHANGE UP (ref 0–7)
IMM GRANULOCYTES NFR BLD AUTO: 0.3 % — SIGNIFICANT CHANGE UP (ref 0.1–0.3)
IMM GRANULOCYTES NFR BLD AUTO: 0.3 % — SIGNIFICANT CHANGE UP (ref 0.1–0.3)
IMM GRANULOCYTES NFR BLD AUTO: 0.4 % — HIGH (ref 0.1–0.3)
IMM GRANULOCYTES NFR BLD AUTO: 0.6 % — HIGH (ref 0.1–0.3)
INR BLD: 1.05 RATIO — SIGNIFICANT CHANGE UP (ref 0.65–1.3)
KETONES UR-MCNC: NEGATIVE — SIGNIFICANT CHANGE UP
LACTATE BLDV-MCNC: 0.7 MMOL/L — SIGNIFICANT CHANGE UP (ref 0.5–2)
LACTATE SERPL-SCNC: 0.8 MMOL/L — SIGNIFICANT CHANGE UP (ref 0.7–2)
LEUKOCYTE ESTERASE UR-ACNC: ABNORMAL
LYMPHOCYTES # BLD AUTO: 1.31 K/UL — SIGNIFICANT CHANGE UP (ref 1.2–3.4)
LYMPHOCYTES # BLD AUTO: 1.48 K/UL — SIGNIFICANT CHANGE UP (ref 1.2–3.4)
LYMPHOCYTES # BLD AUTO: 1.52 K/UL — SIGNIFICANT CHANGE UP (ref 1.2–3.4)
LYMPHOCYTES # BLD AUTO: 1.54 K/UL — SIGNIFICANT CHANGE UP (ref 1.2–3.4)
LYMPHOCYTES # BLD AUTO: 1.55 K/UL — SIGNIFICANT CHANGE UP (ref 1.2–3.4)
LYMPHOCYTES # BLD AUTO: 1.79 K/UL — SIGNIFICANT CHANGE UP (ref 1.2–3.4)
LYMPHOCYTES # BLD AUTO: 1.99 K/UL — SIGNIFICANT CHANGE UP (ref 1.2–3.4)
LYMPHOCYTES # BLD AUTO: 10.7 % — LOW (ref 20.5–51.1)
LYMPHOCYTES # BLD AUTO: 15.2 % — LOW (ref 20.5–51.1)
LYMPHOCYTES # BLD AUTO: 15.4 % — LOW (ref 20.5–51.1)
LYMPHOCYTES # BLD AUTO: 16.7 % — LOW (ref 20.5–51.1)
LYMPHOCYTES # BLD AUTO: 16.8 % — LOW (ref 20.5–51.1)
LYMPHOCYTES # BLD AUTO: 17.1 % — LOW (ref 20.5–51.1)
LYMPHOCYTES # BLD AUTO: 19.7 % — LOW (ref 20.5–51.1)
LYMPHOCYTES # BLD AUTO: 2.2 K/UL — SIGNIFICANT CHANGE UP (ref 1.2–3.4)
LYMPHOCYTES # BLD AUTO: 22.8 % — SIGNIFICANT CHANGE UP (ref 20.5–51.1)
MAGNESIUM SERPL-MCNC: 1.7 MG/DL — LOW (ref 1.8–2.4)
MAGNESIUM SERPL-MCNC: 1.7 MG/DL — LOW (ref 1.8–2.4)
MAGNESIUM SERPL-MCNC: 1.8 MG/DL — SIGNIFICANT CHANGE UP (ref 1.8–2.4)
MAGNESIUM SERPL-MCNC: 1.8 MG/DL — SIGNIFICANT CHANGE UP (ref 1.8–2.4)
MAGNESIUM SERPL-MCNC: 1.9 MG/DL — SIGNIFICANT CHANGE UP (ref 1.8–2.4)
MAGNESIUM SERPL-MCNC: 1.9 MG/DL — SIGNIFICANT CHANGE UP (ref 1.8–2.4)
MAGNESIUM SERPL-MCNC: 2 MG/DL — SIGNIFICANT CHANGE UP (ref 1.8–2.4)
MAGNESIUM SERPL-MCNC: 2.1 MG/DL — SIGNIFICANT CHANGE UP (ref 1.8–2.4)
MAGNESIUM SERPL-MCNC: 2.4 MG/DL — SIGNIFICANT CHANGE UP (ref 1.8–2.4)
MAGNESIUM SERPL-MCNC: 2.5 MG/DL — HIGH (ref 1.8–2.4)
MAGNESIUM SERPL-MCNC: 2.8 MG/DL — HIGH (ref 1.8–2.4)
MCHC RBC-ENTMCNC: 29.2 PG — SIGNIFICANT CHANGE UP (ref 27–31)
MCHC RBC-ENTMCNC: 29.6 G/DL — LOW (ref 32–37)
MCHC RBC-ENTMCNC: 29.9 PG — SIGNIFICANT CHANGE UP (ref 27–31)
MCHC RBC-ENTMCNC: 30.1 PG — SIGNIFICANT CHANGE UP (ref 27–31)
MCHC RBC-ENTMCNC: 30.2 PG — SIGNIFICANT CHANGE UP (ref 27–31)
MCHC RBC-ENTMCNC: 30.6 G/DL — LOW (ref 32–37)
MCHC RBC-ENTMCNC: 30.6 PG — SIGNIFICANT CHANGE UP (ref 27–31)
MCHC RBC-ENTMCNC: 30.8 PG — SIGNIFICANT CHANGE UP (ref 27–31)
MCHC RBC-ENTMCNC: 30.8 PG — SIGNIFICANT CHANGE UP (ref 27–31)
MCHC RBC-ENTMCNC: 30.9 PG — SIGNIFICANT CHANGE UP (ref 27–31)
MCHC RBC-ENTMCNC: 31 PG — SIGNIFICANT CHANGE UP (ref 27–31)
MCHC RBC-ENTMCNC: 31.2 G/DL — LOW (ref 32–37)
MCHC RBC-ENTMCNC: 31.3 G/DL — LOW (ref 32–37)
MCHC RBC-ENTMCNC: 31.7 G/DL — LOW (ref 32–37)
MCHC RBC-ENTMCNC: 32.2 G/DL — SIGNIFICANT CHANGE UP (ref 32–37)
MCHC RBC-ENTMCNC: 32.5 G/DL — SIGNIFICANT CHANGE UP (ref 32–37)
MCHC RBC-ENTMCNC: 32.5 G/DL — SIGNIFICANT CHANGE UP (ref 32–37)
MCHC RBC-ENTMCNC: 33.5 G/DL — SIGNIFICANT CHANGE UP (ref 32–37)
MCHC RBC-ENTMCNC: 33.5 G/DL — SIGNIFICANT CHANGE UP (ref 32–37)
MCHC RBC-ENTMCNC: 33.8 G/DL — SIGNIFICANT CHANGE UP (ref 32–37)
MCV RBC AUTO: 91.3 FL — SIGNIFICANT CHANGE UP (ref 80–94)
MCV RBC AUTO: 92.1 FL — SIGNIFICANT CHANGE UP (ref 80–94)
MCV RBC AUTO: 92.5 FL — SIGNIFICANT CHANGE UP (ref 80–94)
MCV RBC AUTO: 94 FL — SIGNIFICANT CHANGE UP (ref 80–94)
MCV RBC AUTO: 95 FL — HIGH (ref 80–94)
MCV RBC AUTO: 95 FL — HIGH (ref 80–94)
MCV RBC AUTO: 95.3 FL — HIGH (ref 80–94)
MCV RBC AUTO: 96.6 FL — HIGH (ref 80–94)
MCV RBC AUTO: 97.6 FL — HIGH (ref 80–94)
MCV RBC AUTO: 97.9 FL — HIGH (ref 80–94)
MCV RBC AUTO: 98.8 FL — HIGH (ref 80–94)
METHOD TYPE: SIGNIFICANT CHANGE UP
MONOCYTES # BLD AUTO: 0.49 K/UL — SIGNIFICANT CHANGE UP (ref 0.1–0.6)
MONOCYTES # BLD AUTO: 0.56 K/UL — SIGNIFICANT CHANGE UP (ref 0.1–0.6)
MONOCYTES # BLD AUTO: 0.57 K/UL — SIGNIFICANT CHANGE UP (ref 0.1–0.6)
MONOCYTES # BLD AUTO: 0.59 K/UL — SIGNIFICANT CHANGE UP (ref 0.1–0.6)
MONOCYTES # BLD AUTO: 0.59 K/UL — SIGNIFICANT CHANGE UP (ref 0.1–0.6)
MONOCYTES # BLD AUTO: 0.6 K/UL — SIGNIFICANT CHANGE UP (ref 0.1–0.6)
MONOCYTES # BLD AUTO: 0.7 K/UL — HIGH (ref 0.1–0.6)
MONOCYTES # BLD AUTO: 0.82 K/UL — HIGH (ref 0.1–0.6)
MONOCYTES NFR BLD AUTO: 4.3 % — SIGNIFICANT CHANGE UP (ref 1.7–9.3)
MONOCYTES NFR BLD AUTO: 5 % — SIGNIFICANT CHANGE UP (ref 1.7–9.3)
MONOCYTES NFR BLD AUTO: 6 % — SIGNIFICANT CHANGE UP (ref 1.7–9.3)
MONOCYTES NFR BLD AUTO: 6.2 % — SIGNIFICANT CHANGE UP (ref 1.7–9.3)
MONOCYTES NFR BLD AUTO: 6.4 % — SIGNIFICANT CHANGE UP (ref 1.7–9.3)
MONOCYTES NFR BLD AUTO: 6.7 % — SIGNIFICANT CHANGE UP (ref 1.7–9.3)
MONOCYTES NFR BLD AUTO: 6.8 % — SIGNIFICANT CHANGE UP (ref 1.7–9.3)
MONOCYTES NFR BLD AUTO: 7.7 % — SIGNIFICANT CHANGE UP (ref 1.7–9.3)
NEUTROPHILS # BLD AUTO: 5.2 K/UL — SIGNIFICANT CHANGE UP (ref 1.4–6.5)
NEUTROPHILS # BLD AUTO: 5.65 K/UL — SIGNIFICANT CHANGE UP (ref 1.4–6.5)
NEUTROPHILS # BLD AUTO: 6.26 K/UL — SIGNIFICANT CHANGE UP (ref 1.4–6.5)
NEUTROPHILS # BLD AUTO: 6.46 K/UL — SIGNIFICANT CHANGE UP (ref 1.4–6.5)
NEUTROPHILS # BLD AUTO: 7.61 K/UL — HIGH (ref 1.4–6.5)
NEUTROPHILS # BLD AUTO: 8.8 K/UL — HIGH (ref 1.4–6.5)
NEUTROPHILS # BLD AUTO: 9.59 K/UL — HIGH (ref 1.4–6.5)
NEUTROPHILS # BLD AUTO: 9.82 K/UL — HIGH (ref 1.4–6.5)
NEUTROPHILS NFR BLD AUTO: 64.6 % — SIGNIFICANT CHANGE UP (ref 42.2–75.2)
NEUTROPHILS NFR BLD AUTO: 66.1 % — SIGNIFICANT CHANGE UP (ref 42.2–75.2)
NEUTROPHILS NFR BLD AUTO: 71 % — SIGNIFICANT CHANGE UP (ref 42.2–75.2)
NEUTROPHILS NFR BLD AUTO: 71.4 % — SIGNIFICANT CHANGE UP (ref 42.2–75.2)
NEUTROPHILS NFR BLD AUTO: 74.4 % — SIGNIFICANT CHANGE UP (ref 42.2–75.2)
NEUTROPHILS NFR BLD AUTO: 74.6 % — SIGNIFICANT CHANGE UP (ref 42.2–75.2)
NEUTROPHILS NFR BLD AUTO: 76 % — HIGH (ref 42.2–75.2)
NEUTROPHILS NFR BLD AUTO: 79.8 % — HIGH (ref 42.2–75.2)
NITRITE UR-MCNC: NEGATIVE — SIGNIFICANT CHANGE UP
NRBC # BLD: 0 /100 WBCS — SIGNIFICANT CHANGE UP (ref 0–0)
ORGANISM # SPEC MICROSCOPIC CNT: SIGNIFICANT CHANGE UP
OSMOLALITY UR: 260 MOS/KG — SIGNIFICANT CHANGE UP (ref 50–1200)
PCO2 BLDV: 45 MMHG — SIGNIFICANT CHANGE UP (ref 42–55)
PH BLDV: 7.36 — SIGNIFICANT CHANGE UP (ref 7.32–7.43)
PH UR: 6.5 — SIGNIFICANT CHANGE UP (ref 5–8)
PHOSPHATE SERPL-MCNC: 2.5 MG/DL — SIGNIFICANT CHANGE UP (ref 2.1–4.9)
PHOSPHATE SERPL-MCNC: 3.3 MG/DL — SIGNIFICANT CHANGE UP (ref 2.1–4.9)
PHOSPHATE SERPL-MCNC: 4.8 MG/DL — SIGNIFICANT CHANGE UP (ref 2.1–4.9)
PLATELET # BLD AUTO: 338 K/UL — SIGNIFICANT CHANGE UP (ref 130–400)
PLATELET # BLD AUTO: 340 K/UL — SIGNIFICANT CHANGE UP (ref 130–400)
PLATELET # BLD AUTO: 343 K/UL — SIGNIFICANT CHANGE UP (ref 130–400)
PLATELET # BLD AUTO: 344 K/UL — SIGNIFICANT CHANGE UP (ref 130–400)
PLATELET # BLD AUTO: 361 K/UL — SIGNIFICANT CHANGE UP (ref 130–400)
PLATELET # BLD AUTO: 361 K/UL — SIGNIFICANT CHANGE UP (ref 130–400)
PLATELET # BLD AUTO: 383 K/UL — SIGNIFICANT CHANGE UP (ref 130–400)
PLATELET # BLD AUTO: 386 K/UL — SIGNIFICANT CHANGE UP (ref 130–400)
PLATELET # BLD AUTO: 398 K/UL — SIGNIFICANT CHANGE UP (ref 130–400)
PLATELET # BLD AUTO: 402 K/UL — HIGH (ref 130–400)
PLATELET # BLD AUTO: 408 K/UL — HIGH (ref 130–400)
PO2 BLDV: 53 MMHG — SIGNIFICANT CHANGE UP
POTASSIUM BLDV-SCNC: 2.3 MMOL/L — CRITICAL LOW (ref 3.5–5.1)
POTASSIUM SERPL-MCNC: 2.6 MMOL/L — CRITICAL LOW (ref 3.5–5)
POTASSIUM SERPL-MCNC: 2.9 MMOL/L — LOW (ref 3.5–5)
POTASSIUM SERPL-MCNC: 3 MMOL/L — LOW (ref 3.5–5)
POTASSIUM SERPL-MCNC: 3.1 MMOL/L — LOW (ref 3.5–5)
POTASSIUM SERPL-MCNC: 3.1 MMOL/L — LOW (ref 3.5–5)
POTASSIUM SERPL-MCNC: 3.7 MMOL/L — SIGNIFICANT CHANGE UP (ref 3.5–5)
POTASSIUM SERPL-MCNC: 3.8 MMOL/L — SIGNIFICANT CHANGE UP (ref 3.5–5)
POTASSIUM SERPL-MCNC: 4 MMOL/L — SIGNIFICANT CHANGE UP (ref 3.5–5)
POTASSIUM SERPL-MCNC: 4 MMOL/L — SIGNIFICANT CHANGE UP (ref 3.5–5)
POTASSIUM SERPL-MCNC: 4.1 MMOL/L — SIGNIFICANT CHANGE UP (ref 3.5–5)
POTASSIUM SERPL-MCNC: 4.3 MMOL/L — SIGNIFICANT CHANGE UP (ref 3.5–5)
POTASSIUM SERPL-MCNC: 4.5 MMOL/L — SIGNIFICANT CHANGE UP (ref 3.5–5)
POTASSIUM SERPL-SCNC: 2.6 MMOL/L — CRITICAL LOW (ref 3.5–5)
POTASSIUM SERPL-SCNC: 2.9 MMOL/L — LOW (ref 3.5–5)
POTASSIUM SERPL-SCNC: 3 MMOL/L — LOW (ref 3.5–5)
POTASSIUM SERPL-SCNC: 3.1 MMOL/L — LOW (ref 3.5–5)
POTASSIUM SERPL-SCNC: 3.1 MMOL/L — LOW (ref 3.5–5)
POTASSIUM SERPL-SCNC: 3.7 MMOL/L — SIGNIFICANT CHANGE UP (ref 3.5–5)
POTASSIUM SERPL-SCNC: 3.8 MMOL/L — SIGNIFICANT CHANGE UP (ref 3.5–5)
POTASSIUM SERPL-SCNC: 4 MMOL/L — SIGNIFICANT CHANGE UP (ref 3.5–5)
POTASSIUM SERPL-SCNC: 4 MMOL/L — SIGNIFICANT CHANGE UP (ref 3.5–5)
POTASSIUM SERPL-SCNC: 4.1 MMOL/L — SIGNIFICANT CHANGE UP (ref 3.5–5)
POTASSIUM SERPL-SCNC: 4.3 MMOL/L — SIGNIFICANT CHANGE UP (ref 3.5–5)
POTASSIUM SERPL-SCNC: 4.5 MMOL/L — SIGNIFICANT CHANGE UP (ref 3.5–5)
PROCALCITONIN SERPL-MCNC: 0.33 NG/ML — HIGH (ref 0.02–0.1)
PROCALCITONIN SERPL-MCNC: 0.86 NG/ML — HIGH (ref 0.02–0.1)
PROT ?TM UR-MCNC: >564 MG/DLG/24H — SIGNIFICANT CHANGE UP
PROT SERPL-MCNC: 5.3 G/DL — LOW (ref 6–8)
PROT SERPL-MCNC: 5.3 G/DL — LOW (ref 6–8)
PROT SERPL-MCNC: 5.5 G/DL — LOW (ref 6–8)
PROT SERPL-MCNC: 5.6 G/DL — LOW (ref 6–8)
PROT SERPL-MCNC: 5.7 G/DL — LOW (ref 6–8)
PROT SERPL-MCNC: 5.7 G/DL — LOW (ref 6–8)
PROT SERPL-MCNC: 5.8 G/DL — LOW (ref 6–8)
PROT SERPL-MCNC: 5.9 G/DL — LOW (ref 6–8)
PROT SERPL-MCNC: 5.9 G/DL — LOW (ref 6–8)
PROT SERPL-MCNC: 6.2 G/DL — SIGNIFICANT CHANGE UP (ref 6–8)
PROT SERPL-MCNC: 6.4 G/DL — SIGNIFICANT CHANGE UP (ref 6–8)
PROT UR-MCNC: ABNORMAL
PROT/CREAT UR-RTO: >26.9 RATIO — HIGH (ref 0–0.2)
PROTHROM AB SERPL-ACNC: 12 SEC — SIGNIFICANT CHANGE UP (ref 9.95–12.87)
PSA FLD-MCNC: 2.2 NG/ML — SIGNIFICANT CHANGE UP (ref 0–4)
RBC # BLD: 2.4 M/UL — LOW (ref 4.7–6.1)
RBC # BLD: 2.4 M/UL — LOW (ref 4.7–6.1)
RBC # BLD: 2.42 M/UL — LOW (ref 4.7–6.1)
RBC # BLD: 2.42 M/UL — LOW (ref 4.7–6.1)
RBC # BLD: 2.5 M/UL — LOW (ref 4.7–6.1)
RBC # BLD: 2.52 M/UL — LOW (ref 4.7–6.1)
RBC # BLD: 2.54 M/UL — LOW (ref 4.7–6.1)
RBC # BLD: 2.58 M/UL — LOW (ref 4.7–6.1)
RBC # BLD: 2.59 M/UL — LOW (ref 4.7–6.1)
RBC # BLD: 2.65 M/UL — LOW (ref 4.7–6.1)
RBC # BLD: 2.68 M/UL — LOW (ref 4.7–6.1)
RBC # FLD: 15.1 % — HIGH (ref 11.5–14.5)
RBC # FLD: 15.4 % — HIGH (ref 11.5–14.5)
RBC # FLD: 15.9 % — HIGH (ref 11.5–14.5)
RBC # FLD: 15.9 % — HIGH (ref 11.5–14.5)
RBC # FLD: 16.1 % — HIGH (ref 11.5–14.5)
RBC # FLD: 16.2 % — HIGH (ref 11.5–14.5)
RBC # FLD: 16.2 % — HIGH (ref 11.5–14.5)
RBC # FLD: 16.5 % — HIGH (ref 11.5–14.5)
RBC # FLD: 16.6 % — HIGH (ref 11.5–14.5)
RBC # FLD: 16.8 % — HIGH (ref 11.5–14.5)
RBC # FLD: 17.2 % — HIGH (ref 11.5–14.5)
RBC CASTS # UR COMP ASSIST: 4 /HPF — SIGNIFICANT CHANGE UP (ref 0–4)
SAO2 % BLDV: 87.7 % — SIGNIFICANT CHANGE UP
SARS-COV-2 RNA SPEC QL NAA+PROBE: SIGNIFICANT CHANGE UP
SARS-COV-2 RNA SPEC QL NAA+PROBE: SIGNIFICANT CHANGE UP
SODIUM SERPL-SCNC: 139 MMOL/L — SIGNIFICANT CHANGE UP (ref 135–146)
SODIUM SERPL-SCNC: 139 MMOL/L — SIGNIFICANT CHANGE UP (ref 135–146)
SODIUM SERPL-SCNC: 141 MMOL/L — SIGNIFICANT CHANGE UP (ref 135–146)
SODIUM SERPL-SCNC: 142 MMOL/L — SIGNIFICANT CHANGE UP (ref 135–146)
SODIUM SERPL-SCNC: 143 MMOL/L — SIGNIFICANT CHANGE UP (ref 135–146)
SODIUM SERPL-SCNC: 144 MMOL/L — SIGNIFICANT CHANGE UP (ref 135–146)
SODIUM SERPL-SCNC: 144 MMOL/L — SIGNIFICANT CHANGE UP (ref 135–146)
SODIUM SERPL-SCNC: 145 MMOL/L — SIGNIFICANT CHANGE UP (ref 135–146)
SODIUM SERPL-SCNC: 148 MMOL/L — HIGH (ref 135–146)
SODIUM SERPL-SCNC: 149 MMOL/L — HIGH (ref 135–146)
SODIUM SERPL-SCNC: 153 MMOL/L — HIGH (ref 135–146)
SODIUM SERPL-SCNC: 155 MMOL/L — HIGH (ref 135–146)
SODIUM UR-SCNC: 77 MMOL/L — SIGNIFICANT CHANGE UP
SP GR SPEC: 1.01 — SIGNIFICANT CHANGE UP (ref 1.01–1.03)
SPECIMEN SOURCE: SIGNIFICANT CHANGE UP
UROBILINOGEN FLD QL: SIGNIFICANT CHANGE UP
WBC # BLD: 10.01 K/UL — SIGNIFICANT CHANGE UP (ref 4.8–10.8)
WBC # BLD: 11.79 K/UL — HIGH (ref 4.8–10.8)
WBC # BLD: 12.3 K/UL — HIGH (ref 4.8–10.8)
WBC # BLD: 12.88 K/UL — HIGH (ref 4.8–10.8)
WBC # BLD: 13.12 K/UL — HIGH (ref 4.8–10.8)
WBC # BLD: 7.86 K/UL — SIGNIFICANT CHANGE UP (ref 4.8–10.8)
WBC # BLD: 8.38 K/UL — SIGNIFICANT CHANGE UP (ref 4.8–10.8)
WBC # BLD: 8.74 K/UL — SIGNIFICANT CHANGE UP (ref 4.8–10.8)
WBC # BLD: 8.84 K/UL — SIGNIFICANT CHANGE UP (ref 4.8–10.8)
WBC # BLD: 9.05 K/UL — SIGNIFICANT CHANGE UP (ref 4.8–10.8)
WBC # BLD: 9.75 K/UL — SIGNIFICANT CHANGE UP (ref 4.8–10.8)
WBC # FLD AUTO: 10.01 K/UL — SIGNIFICANT CHANGE UP (ref 4.8–10.8)
WBC # FLD AUTO: 11.79 K/UL — HIGH (ref 4.8–10.8)
WBC # FLD AUTO: 12.3 K/UL — HIGH (ref 4.8–10.8)
WBC # FLD AUTO: 12.88 K/UL — HIGH (ref 4.8–10.8)
WBC # FLD AUTO: 13.12 K/UL — HIGH (ref 4.8–10.8)
WBC # FLD AUTO: 7.86 K/UL — SIGNIFICANT CHANGE UP (ref 4.8–10.8)
WBC # FLD AUTO: 8.38 K/UL — SIGNIFICANT CHANGE UP (ref 4.8–10.8)
WBC # FLD AUTO: 8.74 K/UL — SIGNIFICANT CHANGE UP (ref 4.8–10.8)
WBC # FLD AUTO: 8.84 K/UL — SIGNIFICANT CHANGE UP (ref 4.8–10.8)
WBC # FLD AUTO: 9.05 K/UL — SIGNIFICANT CHANGE UP (ref 4.8–10.8)
WBC # FLD AUTO: 9.75 K/UL — SIGNIFICANT CHANGE UP (ref 4.8–10.8)
WBC UR QL: 58 /HPF — HIGH (ref 0–5)

## 2022-01-01 PROCEDURE — 99233 SBSQ HOSP IP/OBS HIGH 50: CPT

## 2022-01-01 PROCEDURE — 71045 X-RAY EXAM CHEST 1 VIEW: CPT | Mod: 26

## 2022-01-01 PROCEDURE — 99232 SBSQ HOSP IP/OBS MODERATE 35: CPT

## 2022-01-01 PROCEDURE — 76770 US EXAM ABDO BACK WALL COMP: CPT | Mod: 26

## 2022-01-01 PROCEDURE — 76870 US EXAM SCROTUM: CPT | Mod: 26

## 2022-01-01 PROCEDURE — 93010 ELECTROCARDIOGRAM REPORT: CPT

## 2022-01-01 PROCEDURE — 99239 HOSP IP/OBS DSCHRG MGMT >30: CPT

## 2022-01-01 PROCEDURE — 74177 CT ABD & PELVIS W/CONTRAST: CPT | Mod: 26

## 2022-01-01 PROCEDURE — 93970 EXTREMITY STUDY: CPT | Mod: 26

## 2022-01-01 PROCEDURE — 99222 1ST HOSP IP/OBS MODERATE 55: CPT

## 2022-01-01 PROCEDURE — 99223 1ST HOSP IP/OBS HIGH 75: CPT

## 2022-01-01 PROCEDURE — 99285 EMERGENCY DEPT VISIT HI MDM: CPT

## 2022-01-01 PROCEDURE — 71045 X-RAY EXAM CHEST 1 VIEW: CPT | Mod: 26,59

## 2022-01-01 PROCEDURE — 99231 SBSQ HOSP IP/OBS SF/LOW 25: CPT

## 2022-01-01 RX ORDER — GLUCAGON INJECTION, SOLUTION 0.5 MG/.1ML
1 INJECTION, SOLUTION SUBCUTANEOUS ONCE
Refills: 0 | Status: DISCONTINUED | OUTPATIENT
Start: 2022-01-01 | End: 2022-01-01

## 2022-01-01 RX ORDER — INSULIN LISPRO 100/ML
100 VIAL (ML) SUBCUTANEOUS
Qty: 0 | Refills: 0 | DISCHARGE

## 2022-01-01 RX ORDER — CHLORHEXIDINE GLUCONATE 213 G/1000ML
15 SOLUTION TOPICAL
Refills: 0 | Status: DISCONTINUED | OUTPATIENT
Start: 2022-01-01 | End: 2022-01-26

## 2022-01-01 RX ORDER — SODIUM CHLORIDE 9 MG/ML
1000 INJECTION, SOLUTION INTRAVENOUS
Refills: 0 | Status: DISCONTINUED | OUTPATIENT
Start: 2022-01-01 | End: 2022-01-01

## 2022-01-01 RX ORDER — PIPERACILLIN AND TAZOBACTAM 4; .5 G/20ML; G/20ML
3.38 INJECTION, POWDER, LYOPHILIZED, FOR SOLUTION INTRAVENOUS EVERY 8 HOURS
Refills: 0 | Status: DISCONTINUED | OUTPATIENT
Start: 2022-01-01 | End: 2022-01-01

## 2022-01-01 RX ORDER — MIDODRINE HYDROCHLORIDE 2.5 MG/1
5 TABLET ORAL THREE TIMES A DAY
Refills: 0 | Status: DISCONTINUED | OUTPATIENT
Start: 2022-01-01 | End: 2022-01-01

## 2022-01-01 RX ORDER — ERGOCALCIFEROL 1.25 MG/1
8000 CAPSULE ORAL DAILY
Refills: 0 | Status: DISCONTINUED | OUTPATIENT
Start: 2022-01-01 | End: 2022-01-01

## 2022-01-01 RX ORDER — CHLORHEXIDINE GLUCONATE 213 G/1000ML
15 SOLUTION TOPICAL EVERY 12 HOURS
Refills: 0 | Status: DISCONTINUED | OUTPATIENT
Start: 2022-01-01 | End: 2022-01-01

## 2022-01-01 RX ORDER — METFORMIN HYDROCHLORIDE 850 MG/1
1 TABLET ORAL
Qty: 0 | Refills: 0 | DISCHARGE

## 2022-01-01 RX ORDER — FOLIC ACID 0.8 MG
1 TABLET ORAL DAILY
Refills: 0 | Status: DISCONTINUED | OUTPATIENT
Start: 2022-01-01 | End: 2022-01-01

## 2022-01-01 RX ORDER — INSULIN LISPRO 100/ML
VIAL (ML) SUBCUTANEOUS
Refills: 0 | Status: DISCONTINUED | OUTPATIENT
Start: 2022-01-01 | End: 2022-01-01

## 2022-01-01 RX ORDER — ASPIRIN/CALCIUM CARB/MAGNESIUM 324 MG
81 TABLET ORAL DAILY
Refills: 0 | Status: DISCONTINUED | OUTPATIENT
Start: 2022-01-01 | End: 2022-01-01

## 2022-01-01 RX ORDER — CEFEPIME 1 G/1
2000 INJECTION, POWDER, FOR SOLUTION INTRAMUSCULAR; INTRAVENOUS EVERY 12 HOURS
Refills: 0 | Status: DISCONTINUED | OUTPATIENT
Start: 2022-01-01 | End: 2022-01-01

## 2022-01-01 RX ORDER — ACETAMINOPHEN 500 MG
650 TABLET ORAL ONCE
Refills: 0 | Status: DISCONTINUED | OUTPATIENT
Start: 2022-01-01 | End: 2022-01-01

## 2022-01-01 RX ORDER — PIPERACILLIN AND TAZOBACTAM 4; .5 G/20ML; G/20ML
3.38 INJECTION, POWDER, LYOPHILIZED, FOR SOLUTION INTRAVENOUS ONCE
Refills: 0 | Status: DISCONTINUED | OUTPATIENT
Start: 2022-01-01 | End: 2022-01-01

## 2022-01-01 RX ORDER — ATORVASTATIN CALCIUM 80 MG/1
80 TABLET, FILM COATED ORAL AT BEDTIME
Refills: 0 | Status: DISCONTINUED | OUTPATIENT
Start: 2022-01-01 | End: 2022-01-01

## 2022-01-01 RX ORDER — MAGNESIUM SULFATE 500 MG/ML
2 VIAL (ML) INJECTION
Refills: 0 | Status: COMPLETED | OUTPATIENT
Start: 2022-01-01 | End: 2022-01-01

## 2022-01-01 RX ORDER — ATORVASTATIN CALCIUM 80 MG/1
40 TABLET, FILM COATED ORAL AT BEDTIME
Refills: 0 | Status: DISCONTINUED | OUTPATIENT
Start: 2022-01-01 | End: 2022-01-26

## 2022-01-01 RX ORDER — POTASSIUM CHLORIDE 20 MEQ
40 PACKET (EA) ORAL ONCE
Refills: 0 | Status: COMPLETED | OUTPATIENT
Start: 2022-01-01 | End: 2022-01-01

## 2022-01-01 RX ORDER — FERROUS SULFATE 325(65) MG
300 TABLET ORAL DAILY
Refills: 0 | Status: DISCONTINUED | OUTPATIENT
Start: 2022-01-01 | End: 2022-01-01

## 2022-01-01 RX ORDER — APIXABAN 2.5 MG/1
5 TABLET, FILM COATED ORAL EVERY 12 HOURS
Refills: 0 | Status: DISCONTINUED | OUTPATIENT
Start: 2022-01-01 | End: 2022-01-01

## 2022-01-01 RX ORDER — IPRATROPIUM BROMIDE 0.2 MG/ML
500 SOLUTION, NON-ORAL INHALATION EVERY 6 HOURS
Refills: 0 | Status: DISCONTINUED | OUTPATIENT
Start: 2022-01-01 | End: 2022-01-01

## 2022-01-01 RX ORDER — ALBUTEROL 90 UG/1
2.5 AEROSOL, METERED ORAL ONCE
Refills: 0 | Status: DISCONTINUED | OUTPATIENT
Start: 2022-01-01 | End: 2022-01-01

## 2022-01-01 RX ORDER — SODIUM CHLORIDE 9 MG/ML
1000 INJECTION, SOLUTION INTRAVENOUS ONCE
Refills: 0 | Status: COMPLETED | OUTPATIENT
Start: 2022-01-01 | End: 2022-01-01

## 2022-01-01 RX ORDER — APIXABAN 2.5 MG/1
5 TABLET, FILM COATED ORAL EVERY 12 HOURS
Refills: 0 | Status: DISCONTINUED | OUTPATIENT
Start: 2022-01-01 | End: 2022-01-26

## 2022-01-01 RX ORDER — ACETAMINOPHEN 500 MG
650 TABLET ORAL ONCE
Refills: 0 | Status: COMPLETED | OUTPATIENT
Start: 2022-01-01 | End: 2022-01-01

## 2022-01-01 RX ORDER — LEVETIRACETAM 250 MG/1
500 TABLET, FILM COATED ORAL
Refills: 0 | Status: DISCONTINUED | OUTPATIENT
Start: 2022-01-01 | End: 2022-01-01

## 2022-01-01 RX ORDER — INSULIN LISPRO 100/ML
100 VIAL (ML) SUBCUTANEOUS
Refills: 0 | Status: DISCONTINUED | OUTPATIENT
Start: 2022-01-01 | End: 2022-01-01

## 2022-01-01 RX ORDER — HEPARIN SODIUM 5000 [USP'U]/ML
5000 INJECTION INTRAVENOUS; SUBCUTANEOUS
Qty: 0 | Refills: 0 | DISCHARGE

## 2022-01-01 RX ORDER — ACETAMINOPHEN 500 MG
650 TABLET ORAL EVERY 6 HOURS
Refills: 0 | Status: DISCONTINUED | OUTPATIENT
Start: 2022-01-01 | End: 2022-01-26

## 2022-01-01 RX ORDER — POTASSIUM CHLORIDE 20 MEQ
20 PACKET (EA) ORAL
Refills: 0 | Status: COMPLETED | OUTPATIENT
Start: 2022-01-01 | End: 2022-01-01

## 2022-01-01 RX ORDER — HEPARIN SODIUM 5000 [USP'U]/ML
5000 INJECTION INTRAVENOUS; SUBCUTANEOUS EVERY 12 HOURS
Refills: 0 | Status: DISCONTINUED | OUTPATIENT
Start: 2022-01-01 | End: 2022-01-01

## 2022-01-01 RX ORDER — SODIUM CHLORIDE 9 MG/ML
3 INJECTION INTRAMUSCULAR; INTRAVENOUS; SUBCUTANEOUS EVERY 6 HOURS
Refills: 0 | Status: DISCONTINUED | OUTPATIENT
Start: 2022-01-01 | End: 2022-01-26

## 2022-01-01 RX ORDER — DEXTROSE 50 % IN WATER 50 %
12.5 SYRINGE (ML) INTRAVENOUS ONCE
Refills: 0 | Status: DISCONTINUED | OUTPATIENT
Start: 2022-01-01 | End: 2022-01-01

## 2022-01-01 RX ORDER — FAMOTIDINE 10 MG/ML
20 INJECTION INTRAVENOUS
Refills: 0 | Status: DISCONTINUED | OUTPATIENT
Start: 2022-01-01 | End: 2022-01-01

## 2022-01-01 RX ORDER — POTASSIUM CHLORIDE 20 MEQ
20 PACKET (EA) ORAL EVERY 6 HOURS
Refills: 0 | Status: COMPLETED | OUTPATIENT
Start: 2022-01-01 | End: 2022-01-01

## 2022-01-01 RX ORDER — DEXTROSE 50 % IN WATER 50 %
25 SYRINGE (ML) INTRAVENOUS ONCE
Refills: 0 | Status: DISCONTINUED | OUTPATIENT
Start: 2022-01-01 | End: 2022-01-01

## 2022-01-01 RX ORDER — ACETAMINOPHEN 500 MG
650 TABLET ORAL EVERY 6 HOURS
Refills: 0 | Status: DISCONTINUED | OUTPATIENT
Start: 2022-01-01 | End: 2022-01-01

## 2022-01-01 RX ORDER — IPRATROPIUM BROMIDE 0.2 MG/ML
1 SOLUTION, NON-ORAL INHALATION EVERY 6 HOURS
Refills: 0 | Status: DISCONTINUED | OUTPATIENT
Start: 2022-01-01 | End: 2022-01-01

## 2022-01-01 RX ORDER — VANCOMYCIN HCL 1 G
125 VIAL (EA) INTRAVENOUS EVERY 6 HOURS
Refills: 0 | Status: DISCONTINUED | OUTPATIENT
Start: 2022-01-01 | End: 2022-01-05

## 2022-01-01 RX ORDER — VANCOMYCIN HCL 1 G
125 VIAL (EA) INTRAVENOUS EVERY 6 HOURS
Refills: 0 | Status: DISCONTINUED | OUTPATIENT
Start: 2022-01-01 | End: 2022-01-01

## 2022-01-01 RX ORDER — CEFEPIME 1 G/1
1000 INJECTION, POWDER, FOR SOLUTION INTRAMUSCULAR; INTRAVENOUS EVERY 12 HOURS
Refills: 0 | Status: DISCONTINUED | OUTPATIENT
Start: 2022-01-01 | End: 2022-01-01

## 2022-01-01 RX ORDER — INSULIN LISPRO 100/ML
VIAL (ML) SUBCUTANEOUS AT BEDTIME
Refills: 0 | Status: DISCONTINUED | OUTPATIENT
Start: 2022-01-01 | End: 2022-01-01

## 2022-01-01 RX ORDER — FAMOTIDINE 10 MG/ML
10 INJECTION INTRAVENOUS
Refills: 0 | Status: DISCONTINUED | OUTPATIENT
Start: 2022-01-01 | End: 2022-01-01

## 2022-01-01 RX ORDER — PANTOPRAZOLE SODIUM 20 MG/1
40 TABLET, DELAYED RELEASE ORAL DAILY
Refills: 0 | Status: DISCONTINUED | OUTPATIENT
Start: 2022-01-01 | End: 2022-01-26

## 2022-01-01 RX ORDER — LEVOFLOXACIN 5 MG/ML
1 INJECTION, SOLUTION INTRAVENOUS
Qty: 2 | Refills: 0
Start: 2022-01-01 | End: 2022-01-01

## 2022-01-01 RX ORDER — TAMSULOSIN HYDROCHLORIDE 0.4 MG/1
0.4 CAPSULE ORAL AT BEDTIME
Refills: 0 | Status: DISCONTINUED | OUTPATIENT
Start: 2022-01-01 | End: 2022-01-01

## 2022-01-01 RX ORDER — POTASSIUM CHLORIDE 20 MEQ
40 PACKET (EA) ORAL
Refills: 0 | Status: COMPLETED | OUTPATIENT
Start: 2022-01-01 | End: 2022-01-01

## 2022-01-01 RX ORDER — MAGNESIUM SULFATE 500 MG/ML
2 VIAL (ML) INJECTION ONCE
Refills: 0 | Status: COMPLETED | OUTPATIENT
Start: 2022-01-01 | End: 2022-01-01

## 2022-01-01 RX ORDER — DEXTROSE 50 % IN WATER 50 %
15 SYRINGE (ML) INTRAVENOUS ONCE
Refills: 0 | Status: DISCONTINUED | OUTPATIENT
Start: 2022-01-01 | End: 2022-01-01

## 2022-01-01 RX ORDER — FAMOTIDINE 10 MG/ML
1 INJECTION INTRAVENOUS
Qty: 0 | Refills: 0 | DISCHARGE

## 2022-01-01 RX ORDER — TAMSULOSIN HYDROCHLORIDE 0.4 MG/1
1 CAPSULE ORAL
Qty: 0 | Refills: 0 | DISCHARGE
Start: 2022-01-01

## 2022-01-01 RX ORDER — CEFTRIAXONE 500 MG/1
1000 INJECTION, POWDER, FOR SOLUTION INTRAMUSCULAR; INTRAVENOUS EVERY 24 HOURS
Refills: 0 | Status: COMPLETED | OUTPATIENT
Start: 2022-01-01 | End: 2022-01-01

## 2022-01-01 RX ADMIN — CHLORHEXIDINE GLUCONATE 15 MILLILITER(S): 213 SOLUTION TOPICAL at 17:08

## 2022-01-01 RX ADMIN — SODIUM CHLORIDE 75 MILLILITER(S): 9 INJECTION, SOLUTION INTRAVENOUS at 12:08

## 2022-01-01 RX ADMIN — Medication 2: at 12:12

## 2022-01-01 RX ADMIN — MIDODRINE HYDROCHLORIDE 5 MILLIGRAM(S): 2.5 TABLET ORAL at 05:04

## 2022-01-01 RX ADMIN — MIDODRINE HYDROCHLORIDE 5 MILLIGRAM(S): 2.5 TABLET ORAL at 06:18

## 2022-01-01 RX ADMIN — MIDODRINE HYDROCHLORIDE 5 MILLIGRAM(S): 2.5 TABLET ORAL at 12:12

## 2022-01-01 RX ADMIN — Medication 300 MILLIGRAM(S): at 12:13

## 2022-01-01 RX ADMIN — FAMOTIDINE 20 MILLIGRAM(S): 10 INJECTION INTRAVENOUS at 06:06

## 2022-01-01 RX ADMIN — HEPARIN SODIUM 5000 UNIT(S): 5000 INJECTION INTRAVENOUS; SUBCUTANEOUS at 17:13

## 2022-01-01 RX ADMIN — LEVETIRACETAM 500 MILLIGRAM(S): 250 TABLET, FILM COATED ORAL at 05:04

## 2022-01-01 RX ADMIN — FAMOTIDINE 20 MILLIGRAM(S): 10 INJECTION INTRAVENOUS at 18:43

## 2022-01-01 RX ADMIN — Medication 81 MILLIGRAM(S): at 12:13

## 2022-01-01 RX ADMIN — Medication 1 PUFF(S): at 02:42

## 2022-01-01 RX ADMIN — Medication 1 PUFF(S): at 07:49

## 2022-01-01 RX ADMIN — ERGOCALCIFEROL 8000 UNIT(S): 1.25 CAPSULE ORAL at 14:03

## 2022-01-01 RX ADMIN — Medication 1 TABLET(S): at 12:13

## 2022-01-01 RX ADMIN — Medication 20 MILLIEQUIVALENT(S): at 13:53

## 2022-01-01 RX ADMIN — Medication 20 MILLIEQUIVALENT(S): at 00:43

## 2022-01-01 RX ADMIN — Medication 300 MILLIGRAM(S): at 11:18

## 2022-01-01 RX ADMIN — Medication 20 MILLIEQUIVALENT(S): at 12:17

## 2022-01-01 RX ADMIN — HEPARIN SODIUM 5000 UNIT(S): 5000 INJECTION INTRAVENOUS; SUBCUTANEOUS at 06:18

## 2022-01-01 RX ADMIN — ATORVASTATIN CALCIUM 80 MILLIGRAM(S): 80 TABLET, FILM COATED ORAL at 21:31

## 2022-01-01 RX ADMIN — Medication 40 MILLIEQUIVALENT(S): at 13:40

## 2022-01-01 RX ADMIN — TAMSULOSIN HYDROCHLORIDE 0.4 MILLIGRAM(S): 0.4 CAPSULE ORAL at 21:18

## 2022-01-01 RX ADMIN — Medication 300 MILLIGRAM(S): at 11:25

## 2022-01-01 RX ADMIN — Medication 1 PUFF(S): at 16:29

## 2022-01-01 RX ADMIN — Medication 300 MILLIGRAM(S): at 11:49

## 2022-01-01 RX ADMIN — Medication 300 MILLIGRAM(S): at 11:34

## 2022-01-01 RX ADMIN — LEVETIRACETAM 500 MILLIGRAM(S): 250 TABLET, FILM COATED ORAL at 17:10

## 2022-01-01 RX ADMIN — ATORVASTATIN CALCIUM 80 MILLIGRAM(S): 80 TABLET, FILM COATED ORAL at 22:17

## 2022-01-01 RX ADMIN — Medication 81 MILLIGRAM(S): at 11:16

## 2022-01-01 RX ADMIN — Medication 40 MILLIEQUIVALENT(S): at 15:22

## 2022-01-01 RX ADMIN — CHLORHEXIDINE GLUCONATE 15 MILLILITER(S): 213 SOLUTION TOPICAL at 05:04

## 2022-01-01 RX ADMIN — Medication 1 PUFF(S): at 08:32

## 2022-01-01 RX ADMIN — LEVETIRACETAM 500 MILLIGRAM(S): 250 TABLET, FILM COATED ORAL at 05:16

## 2022-01-01 RX ADMIN — Medication 1 PUFF(S): at 20:28

## 2022-01-01 RX ADMIN — Medication 1 MILLIGRAM(S): at 12:07

## 2022-01-01 RX ADMIN — HEPARIN SODIUM 5000 UNIT(S): 5000 INJECTION INTRAVENOUS; SUBCUTANEOUS at 18:43

## 2022-01-01 RX ADMIN — CHLORHEXIDINE GLUCONATE 15 MILLILITER(S): 213 SOLUTION TOPICAL at 05:17

## 2022-01-01 RX ADMIN — Medication 125 MILLIGRAM(S): at 23:38

## 2022-01-01 RX ADMIN — Medication 1 PUFF(S): at 02:39

## 2022-01-01 RX ADMIN — FAMOTIDINE 20 MILLIGRAM(S): 10 INJECTION INTRAVENOUS at 05:04

## 2022-01-01 RX ADMIN — TAMSULOSIN HYDROCHLORIDE 0.4 MILLIGRAM(S): 0.4 CAPSULE ORAL at 23:39

## 2022-01-01 RX ADMIN — Medication 1 TABLET(S): at 13:53

## 2022-01-01 RX ADMIN — Medication 12.5 MILLIGRAM(S): at 05:04

## 2022-01-01 RX ADMIN — Medication 1 MILLIGRAM(S): at 12:13

## 2022-01-01 RX ADMIN — Medication 1 PUFF(S): at 08:10

## 2022-01-01 RX ADMIN — TAMSULOSIN HYDROCHLORIDE 0.4 MILLIGRAM(S): 0.4 CAPSULE ORAL at 21:54

## 2022-01-01 RX ADMIN — SODIUM CHLORIDE 75 MILLILITER(S): 9 INJECTION, SOLUTION INTRAVENOUS at 21:11

## 2022-01-01 RX ADMIN — Medication 1 PUFF(S): at 13:10

## 2022-01-01 RX ADMIN — ATORVASTATIN CALCIUM 40 MILLIGRAM(S): 80 TABLET, FILM COATED ORAL at 23:55

## 2022-01-01 RX ADMIN — Medication 50 MILLIEQUIVALENT(S): at 11:58

## 2022-01-01 RX ADMIN — CEFEPIME 100 MILLIGRAM(S): 1 INJECTION, POWDER, FOR SOLUTION INTRAMUSCULAR; INTRAVENOUS at 06:12

## 2022-01-01 RX ADMIN — SODIUM CHLORIDE 100 MILLILITER(S): 9 INJECTION, SOLUTION INTRAVENOUS at 08:45

## 2022-01-01 RX ADMIN — HEPARIN SODIUM 5000 UNIT(S): 5000 INJECTION INTRAVENOUS; SUBCUTANEOUS at 17:44

## 2022-01-01 RX ADMIN — MIDODRINE HYDROCHLORIDE 5 MILLIGRAM(S): 2.5 TABLET ORAL at 05:38

## 2022-01-01 RX ADMIN — HEPARIN SODIUM 5000 UNIT(S): 5000 INJECTION INTRAVENOUS; SUBCUTANEOUS at 17:53

## 2022-01-01 RX ADMIN — MIDODRINE HYDROCHLORIDE 5 MILLIGRAM(S): 2.5 TABLET ORAL at 05:29

## 2022-01-01 RX ADMIN — SODIUM CHLORIDE 75 MILLILITER(S): 9 INJECTION, SOLUTION INTRAVENOUS at 10:15

## 2022-01-01 RX ADMIN — SODIUM CHLORIDE 3 MILLILITER(S): 9 INJECTION INTRAMUSCULAR; INTRAVENOUS; SUBCUTANEOUS at 17:09

## 2022-01-01 RX ADMIN — Medication 2: at 08:18

## 2022-01-01 RX ADMIN — CEFTRIAXONE 100 MILLIGRAM(S): 500 INJECTION, POWDER, FOR SOLUTION INTRAMUSCULAR; INTRAVENOUS at 12:14

## 2022-01-01 RX ADMIN — LEVETIRACETAM 500 MILLIGRAM(S): 250 TABLET, FILM COATED ORAL at 05:39

## 2022-01-01 RX ADMIN — HEPARIN SODIUM 5000 UNIT(S): 5000 INJECTION INTRAVENOUS; SUBCUTANEOUS at 05:35

## 2022-01-01 RX ADMIN — FAMOTIDINE 20 MILLIGRAM(S): 10 INJECTION INTRAVENOUS at 17:10

## 2022-01-01 RX ADMIN — LEVETIRACETAM 500 MILLIGRAM(S): 250 TABLET, FILM COATED ORAL at 18:32

## 2022-01-01 RX ADMIN — MIDODRINE HYDROCHLORIDE 5 MILLIGRAM(S): 2.5 TABLET ORAL at 17:31

## 2022-01-01 RX ADMIN — ATORVASTATIN CALCIUM 80 MILLIGRAM(S): 80 TABLET, FILM COATED ORAL at 21:37

## 2022-01-01 RX ADMIN — SODIUM CHLORIDE 3 MILLILITER(S): 9 INJECTION INTRAMUSCULAR; INTRAVENOUS; SUBCUTANEOUS at 11:24

## 2022-01-01 RX ADMIN — CHLORHEXIDINE GLUCONATE 15 MILLILITER(S): 213 SOLUTION TOPICAL at 05:05

## 2022-01-01 RX ADMIN — ATORVASTATIN CALCIUM 80 MILLIGRAM(S): 80 TABLET, FILM COATED ORAL at 21:55

## 2022-01-01 RX ADMIN — Medication 81 MILLIGRAM(S): at 11:59

## 2022-01-01 RX ADMIN — HUMAN INSULIN 13 UNIT(S): 100 INJECTION, SUSPENSION SUBCUTANEOUS at 05:06

## 2022-01-01 RX ADMIN — Medication 2: at 17:32

## 2022-01-01 RX ADMIN — SODIUM CHLORIDE 100 MILLILITER(S): 9 INJECTION, SOLUTION INTRAVENOUS at 00:27

## 2022-01-01 RX ADMIN — Medication 3 MILLILITER(S): at 17:09

## 2022-01-01 RX ADMIN — MIDODRINE HYDROCHLORIDE 5 MILLIGRAM(S): 2.5 TABLET ORAL at 18:42

## 2022-01-01 RX ADMIN — FAMOTIDINE 20 MILLIGRAM(S): 10 INJECTION INTRAVENOUS at 05:39

## 2022-01-01 RX ADMIN — Medication 1 PUFF(S): at 02:58

## 2022-01-01 RX ADMIN — Medication 125 MILLIGRAM(S): at 11:24

## 2022-01-01 RX ADMIN — HEPARIN SODIUM 5000 UNIT(S): 5000 INJECTION INTRAVENOUS; SUBCUTANEOUS at 17:10

## 2022-01-01 RX ADMIN — LEVETIRACETAM 500 MILLIGRAM(S): 250 TABLET, FILM COATED ORAL at 17:14

## 2022-01-01 RX ADMIN — LEVETIRACETAM 500 MILLIGRAM(S): 250 TABLET, FILM COATED ORAL at 05:30

## 2022-01-01 RX ADMIN — Medication 1 PUFF(S): at 15:50

## 2022-01-01 RX ADMIN — Medication 1 PUFF(S): at 20:20

## 2022-01-01 RX ADMIN — FAMOTIDINE 20 MILLIGRAM(S): 10 INJECTION INTRAVENOUS at 17:13

## 2022-01-01 RX ADMIN — Medication 2: at 12:39

## 2022-01-01 RX ADMIN — CHLORHEXIDINE GLUCONATE 15 MILLILITER(S): 213 SOLUTION TOPICAL at 17:10

## 2022-01-01 RX ADMIN — Medication 2: at 17:10

## 2022-01-01 RX ADMIN — Medication 1 PUFF(S): at 01:04

## 2022-01-01 RX ADMIN — Medication 1 PUFF(S): at 14:21

## 2022-01-01 RX ADMIN — Medication 3 MILLILITER(S): at 11:29

## 2022-01-01 RX ADMIN — ERGOCALCIFEROL 8000 UNIT(S): 1.25 CAPSULE ORAL at 12:06

## 2022-01-01 RX ADMIN — LEVETIRACETAM 500 MILLIGRAM(S): 250 TABLET, FILM COATED ORAL at 05:55

## 2022-01-01 RX ADMIN — Medication 1 PUFF(S): at 08:54

## 2022-01-01 RX ADMIN — MIDODRINE HYDROCHLORIDE 5 MILLIGRAM(S): 2.5 TABLET ORAL at 11:24

## 2022-01-01 RX ADMIN — ERGOCALCIFEROL 8000 UNIT(S): 1.25 CAPSULE ORAL at 12:13

## 2022-01-01 RX ADMIN — TAMSULOSIN HYDROCHLORIDE 0.4 MILLIGRAM(S): 0.4 CAPSULE ORAL at 21:37

## 2022-01-01 RX ADMIN — FAMOTIDINE 20 MILLIGRAM(S): 10 INJECTION INTRAVENOUS at 17:43

## 2022-01-01 RX ADMIN — FAMOTIDINE 20 MILLIGRAM(S): 10 INJECTION INTRAVENOUS at 17:15

## 2022-01-01 RX ADMIN — HEPARIN SODIUM 5000 UNIT(S): 5000 INJECTION INTRAVENOUS; SUBCUTANEOUS at 06:06

## 2022-01-01 RX ADMIN — Medication 1 MILLIGRAM(S): at 11:49

## 2022-01-01 RX ADMIN — Medication 125 MILLIGRAM(S): at 17:10

## 2022-01-01 RX ADMIN — Medication 2: at 17:54

## 2022-01-01 RX ADMIN — FAMOTIDINE 20 MILLIGRAM(S): 10 INJECTION INTRAVENOUS at 17:32

## 2022-01-01 RX ADMIN — Medication 650 MILLIGRAM(S): at 06:00

## 2022-01-01 RX ADMIN — Medication 300 MILLIGRAM(S): at 13:54

## 2022-01-01 RX ADMIN — Medication 2: at 07:29

## 2022-01-01 RX ADMIN — Medication 20 MILLIEQUIVALENT(S): at 05:56

## 2022-01-01 RX ADMIN — Medication 1 MILLIGRAM(S): at 12:14

## 2022-01-01 RX ADMIN — Medication 50 MILLIEQUIVALENT(S): at 15:57

## 2022-01-01 RX ADMIN — Medication 1 PUFF(S): at 09:03

## 2022-01-01 RX ADMIN — Medication 2: at 12:14

## 2022-01-01 RX ADMIN — FAMOTIDINE 20 MILLIGRAM(S): 10 INJECTION INTRAVENOUS at 17:07

## 2022-01-01 RX ADMIN — Medication 1 MILLIGRAM(S): at 11:16

## 2022-01-01 RX ADMIN — Medication 1 PUFF(S): at 20:39

## 2022-01-01 RX ADMIN — SODIUM CHLORIDE 3 MILLILITER(S): 9 INJECTION INTRAMUSCULAR; INTRAVENOUS; SUBCUTANEOUS at 05:05

## 2022-01-01 RX ADMIN — FAMOTIDINE 20 MILLIGRAM(S): 10 INJECTION INTRAVENOUS at 05:16

## 2022-01-01 RX ADMIN — Medication 2: at 06:35

## 2022-01-01 RX ADMIN — MIDODRINE HYDROCHLORIDE 5 MILLIGRAM(S): 2.5 TABLET ORAL at 17:43

## 2022-01-01 RX ADMIN — Medication 1 PUFF(S): at 13:53

## 2022-01-01 RX ADMIN — SODIUM CHLORIDE 100 MILLILITER(S): 9 INJECTION, SOLUTION INTRAVENOUS at 11:00

## 2022-01-01 RX ADMIN — Medication 81 MILLIGRAM(S): at 11:33

## 2022-01-01 RX ADMIN — Medication 300 MILLIGRAM(S): at 12:00

## 2022-01-01 RX ADMIN — CEFEPIME 100 MILLIGRAM(S): 1 INJECTION, POWDER, FOR SOLUTION INTRAMUSCULAR; INTRAVENOUS at 17:07

## 2022-01-01 RX ADMIN — Medication 1 PUFF(S): at 08:14

## 2022-01-01 RX ADMIN — Medication 81 MILLIGRAM(S): at 11:24

## 2022-01-01 RX ADMIN — MIDODRINE HYDROCHLORIDE 5 MILLIGRAM(S): 2.5 TABLET ORAL at 05:26

## 2022-01-01 RX ADMIN — FAMOTIDINE 20 MILLIGRAM(S): 10 INJECTION INTRAVENOUS at 05:55

## 2022-01-01 RX ADMIN — Medication 3 MILLILITER(S): at 23:39

## 2022-01-01 RX ADMIN — Medication 20 MILLIEQUIVALENT(S): at 17:14

## 2022-01-01 RX ADMIN — SODIUM CHLORIDE 3 MILLILITER(S): 9 INJECTION INTRAMUSCULAR; INTRAVENOUS; SUBCUTANEOUS at 23:39

## 2022-01-01 RX ADMIN — Medication 81 MILLIGRAM(S): at 12:12

## 2022-01-01 RX ADMIN — Medication 1 DROP(S): at 23:38

## 2022-01-01 RX ADMIN — MIDODRINE HYDROCHLORIDE 5 MILLIGRAM(S): 2.5 TABLET ORAL at 17:14

## 2022-01-01 RX ADMIN — MIDODRINE HYDROCHLORIDE 5 MILLIGRAM(S): 2.5 TABLET ORAL at 11:16

## 2022-01-01 RX ADMIN — MIDODRINE HYDROCHLORIDE 5 MILLIGRAM(S): 2.5 TABLET ORAL at 11:33

## 2022-01-01 RX ADMIN — HEPARIN SODIUM 5000 UNIT(S): 5000 INJECTION INTRAVENOUS; SUBCUTANEOUS at 05:39

## 2022-01-01 RX ADMIN — Medication 650 MILLIGRAM(S): at 05:38

## 2022-01-01 RX ADMIN — Medication 25 GRAM(S): at 12:08

## 2022-01-01 RX ADMIN — CHLORHEXIDINE GLUCONATE 15 MILLILITER(S): 213 SOLUTION TOPICAL at 18:43

## 2022-01-01 RX ADMIN — Medication 1 PUFF(S): at 20:19

## 2022-01-01 RX ADMIN — TAMSULOSIN HYDROCHLORIDE 0.4 MILLIGRAM(S): 0.4 CAPSULE ORAL at 22:16

## 2022-01-01 RX ADMIN — Medication 1 DROP(S): at 05:03

## 2022-01-01 RX ADMIN — HEPARIN SODIUM 5000 UNIT(S): 5000 INJECTION INTRAVENOUS; SUBCUTANEOUS at 05:17

## 2022-01-01 RX ADMIN — Medication 1 TABLET(S): at 12:09

## 2022-01-01 RX ADMIN — LEVETIRACETAM 500 MILLIGRAM(S): 250 TABLET, FILM COATED ORAL at 05:36

## 2022-01-01 RX ADMIN — CHLORHEXIDINE GLUCONATE 15 MILLILITER(S): 213 SOLUTION TOPICAL at 17:32

## 2022-01-01 RX ADMIN — HUMAN INSULIN 13 UNIT(S): 100 INJECTION, SUSPENSION SUBCUTANEOUS at 11:26

## 2022-01-01 RX ADMIN — Medication 3 MILLILITER(S): at 05:04

## 2022-01-01 RX ADMIN — Medication 1 MILLIGRAM(S): at 11:24

## 2022-01-01 RX ADMIN — Medication 125 MILLIGRAM(S): at 05:04

## 2022-01-01 RX ADMIN — FAMOTIDINE 20 MILLIGRAM(S): 10 INJECTION INTRAVENOUS at 06:18

## 2022-01-01 RX ADMIN — ATORVASTATIN CALCIUM 80 MILLIGRAM(S): 80 TABLET, FILM COATED ORAL at 21:24

## 2022-01-01 RX ADMIN — CEFEPIME 100 MILLIGRAM(S): 1 INJECTION, POWDER, FOR SOLUTION INTRAMUSCULAR; INTRAVENOUS at 18:44

## 2022-01-01 RX ADMIN — HEPARIN SODIUM 5000 UNIT(S): 5000 INJECTION INTRAVENOUS; SUBCUTANEOUS at 18:32

## 2022-01-01 RX ADMIN — TAMSULOSIN HYDROCHLORIDE 0.4 MILLIGRAM(S): 0.4 CAPSULE ORAL at 21:31

## 2022-01-01 RX ADMIN — CEFEPIME 100 MILLIGRAM(S): 1 INJECTION, POWDER, FOR SOLUTION INTRAMUSCULAR; INTRAVENOUS at 06:51

## 2022-01-01 RX ADMIN — Medication 2: at 11:50

## 2022-01-01 RX ADMIN — Medication 1 PUFF(S): at 21:09

## 2022-01-01 RX ADMIN — Medication 1 MILLIGRAM(S): at 15:34

## 2022-01-01 RX ADMIN — Medication 2 MILLIGRAM(S): at 05:08

## 2022-01-01 RX ADMIN — Medication 1 PUFF(S): at 02:51

## 2022-01-01 RX ADMIN — CEFEPIME 100 MILLIGRAM(S): 1 INJECTION, POWDER, FOR SOLUTION INTRAMUSCULAR; INTRAVENOUS at 05:03

## 2022-01-01 RX ADMIN — HEPARIN SODIUM 5000 UNIT(S): 5000 INJECTION INTRAVENOUS; SUBCUTANEOUS at 17:32

## 2022-01-01 RX ADMIN — ATORVASTATIN CALCIUM 80 MILLIGRAM(S): 80 TABLET, FILM COATED ORAL at 22:08

## 2022-01-01 RX ADMIN — FAMOTIDINE 20 MILLIGRAM(S): 10 INJECTION INTRAVENOUS at 17:54

## 2022-01-01 RX ADMIN — LEVETIRACETAM 500 MILLIGRAM(S): 250 TABLET, FILM COATED ORAL at 17:37

## 2022-01-01 RX ADMIN — CEFEPIME 100 MILLIGRAM(S): 1 INJECTION, POWDER, FOR SOLUTION INTRAMUSCULAR; INTRAVENOUS at 17:47

## 2022-01-01 RX ADMIN — LEVETIRACETAM 500 MILLIGRAM(S): 250 TABLET, FILM COATED ORAL at 06:07

## 2022-01-01 RX ADMIN — Medication 4 MILLILITER(S): at 11:57

## 2022-01-01 RX ADMIN — Medication 81 MILLIGRAM(S): at 12:10

## 2022-01-01 RX ADMIN — MIDODRINE HYDROCHLORIDE 5 MILLIGRAM(S): 2.5 TABLET ORAL at 18:32

## 2022-01-01 RX ADMIN — APIXABAN 5 MILLIGRAM(S): 2.5 TABLET, FILM COATED ORAL at 05:04

## 2022-01-01 RX ADMIN — FAMOTIDINE 20 MILLIGRAM(S): 10 INJECTION INTRAVENOUS at 18:32

## 2022-01-01 RX ADMIN — SODIUM CHLORIDE 100 MILLILITER(S): 9 INJECTION, SOLUTION INTRAVENOUS at 14:24

## 2022-01-01 RX ADMIN — Medication 1 TABLET(S): at 11:59

## 2022-01-01 RX ADMIN — LEVETIRACETAM 500 MILLIGRAM(S): 250 TABLET, FILM COATED ORAL at 17:43

## 2022-01-01 RX ADMIN — Medication 650 MILLIGRAM(S): at 15:02

## 2022-01-01 RX ADMIN — Medication 1 TABLET(S): at 11:16

## 2022-01-01 RX ADMIN — Medication 50 MILLIEQUIVALENT(S): at 14:00

## 2022-01-01 RX ADMIN — HEPARIN SODIUM 5000 UNIT(S): 5000 INJECTION INTRAVENOUS; SUBCUTANEOUS at 05:04

## 2022-01-01 RX ADMIN — Medication 1 TABLET(S): at 11:34

## 2022-01-01 RX ADMIN — MIDODRINE HYDROCHLORIDE 5 MILLIGRAM(S): 2.5 TABLET ORAL at 05:35

## 2022-01-01 RX ADMIN — CEFEPIME 100 MILLIGRAM(S): 1 INJECTION, POWDER, FOR SOLUTION INTRAMUSCULAR; INTRAVENOUS at 18:32

## 2022-01-01 RX ADMIN — LEVETIRACETAM 500 MILLIGRAM(S): 250 TABLET, FILM COATED ORAL at 06:18

## 2022-01-01 RX ADMIN — Medication 25 GRAM(S): at 22:20

## 2022-01-01 RX ADMIN — LEVETIRACETAM 500 MILLIGRAM(S): 250 TABLET, FILM COATED ORAL at 17:07

## 2022-01-01 RX ADMIN — Medication 1 PUFF(S): at 21:00

## 2022-01-01 RX ADMIN — CHLORHEXIDINE GLUCONATE 15 MILLILITER(S): 213 SOLUTION TOPICAL at 05:35

## 2022-01-01 RX ADMIN — Medication 4 MILLILITER(S): at 18:13

## 2022-01-01 RX ADMIN — LEVETIRACETAM 500 MILLIGRAM(S): 250 TABLET, FILM COATED ORAL at 17:12

## 2022-01-01 RX ADMIN — Medication 81 MILLIGRAM(S): at 11:49

## 2022-01-01 RX ADMIN — SODIUM CHLORIDE 1000 MILLILITER(S): 9 INJECTION, SOLUTION INTRAVENOUS at 11:03

## 2022-01-01 RX ADMIN — ERGOCALCIFEROL 8000 UNIT(S): 1.25 CAPSULE ORAL at 11:16

## 2022-01-01 RX ADMIN — Medication 1 PUFF(S): at 21:24

## 2022-01-01 RX ADMIN — HEPARIN SODIUM 5000 UNIT(S): 5000 INJECTION INTRAVENOUS; SUBCUTANEOUS at 05:29

## 2022-01-01 RX ADMIN — TAMSULOSIN HYDROCHLORIDE 0.4 MILLIGRAM(S): 0.4 CAPSULE ORAL at 21:55

## 2022-01-01 RX ADMIN — Medication 1 PUFF(S): at 15:03

## 2022-01-01 RX ADMIN — MIDODRINE HYDROCHLORIDE 5 MILLIGRAM(S): 2.5 TABLET ORAL at 05:55

## 2022-01-01 RX ADMIN — Medication 1 TABLET(S): at 11:49

## 2022-01-01 RX ADMIN — ATORVASTATIN CALCIUM 80 MILLIGRAM(S): 80 TABLET, FILM COATED ORAL at 21:18

## 2022-01-01 RX ADMIN — LEVETIRACETAM 500 MILLIGRAM(S): 250 TABLET, FILM COATED ORAL at 17:32

## 2022-01-01 RX ADMIN — LEVETIRACETAM 500 MILLIGRAM(S): 250 TABLET, FILM COATED ORAL at 05:26

## 2022-01-01 RX ADMIN — CEFEPIME 100 MILLIGRAM(S): 1 INJECTION, POWDER, FOR SOLUTION INTRAMUSCULAR; INTRAVENOUS at 17:35

## 2022-01-01 RX ADMIN — HEPARIN SODIUM 5000 UNIT(S): 5000 INJECTION INTRAVENOUS; SUBCUTANEOUS at 05:55

## 2022-01-01 RX ADMIN — Medication 300 MILLIGRAM(S): at 12:14

## 2022-01-01 RX ADMIN — Medication 20 MILLIEQUIVALENT(S): at 17:53

## 2022-01-01 RX ADMIN — HEPARIN SODIUM 5000 UNIT(S): 5000 INJECTION INTRAVENOUS; SUBCUTANEOUS at 17:15

## 2022-01-01 RX ADMIN — Medication 1 MILLIGRAM(S): at 13:54

## 2022-01-01 RX ADMIN — SODIUM CHLORIDE 100 MILLILITER(S): 9 INJECTION, SOLUTION INTRAVENOUS at 17:34

## 2022-01-01 RX ADMIN — ATORVASTATIN CALCIUM 80 MILLIGRAM(S): 80 TABLET, FILM COATED ORAL at 23:32

## 2022-01-01 RX ADMIN — LEVETIRACETAM 500 MILLIGRAM(S): 250 TABLET, FILM COATED ORAL at 18:43

## 2022-01-01 RX ADMIN — Medication 1 TABLET(S): at 11:24

## 2022-01-01 RX ADMIN — HUMAN INSULIN 13 UNIT(S): 100 INJECTION, SUSPENSION SUBCUTANEOUS at 17:11

## 2022-01-01 RX ADMIN — Medication 4 MILLILITER(S): at 05:05

## 2022-01-01 RX ADMIN — Medication 650 MILLIGRAM(S): at 05:04

## 2022-01-01 RX ADMIN — CEFEPIME 100 MILLIGRAM(S): 1 INJECTION, POWDER, FOR SOLUTION INTRAMUSCULAR; INTRAVENOUS at 05:17

## 2022-01-01 RX ADMIN — FAMOTIDINE 20 MILLIGRAM(S): 10 INJECTION INTRAVENOUS at 05:29

## 2022-01-01 RX ADMIN — CEFTRIAXONE 100 MILLIGRAM(S): 500 INJECTION, POWDER, FOR SOLUTION INTRAMUSCULAR; INTRAVENOUS at 10:33

## 2022-01-01 RX ADMIN — Medication 4 MILLILITER(S): at 23:39

## 2022-01-01 RX ADMIN — Medication 1 PUFF(S): at 08:55

## 2022-01-01 RX ADMIN — Medication 650 MILLIGRAM(S): at 15:32

## 2022-01-01 RX ADMIN — Medication 25 GRAM(S): at 15:07

## 2022-01-01 RX ADMIN — HEPARIN SODIUM 5000 UNIT(S): 5000 INJECTION INTRAVENOUS; SUBCUTANEOUS at 05:27

## 2022-01-01 RX ADMIN — ERGOCALCIFEROL 8000 UNIT(S): 1.25 CAPSULE ORAL at 11:25

## 2022-01-01 RX ADMIN — Medication 1 TABLET(S): at 12:12

## 2022-01-01 RX ADMIN — TAMSULOSIN HYDROCHLORIDE 0.4 MILLIGRAM(S): 0.4 CAPSULE ORAL at 21:24

## 2022-01-01 RX ADMIN — PANTOPRAZOLE SODIUM 40 MILLIGRAM(S): 20 TABLET, DELAYED RELEASE ORAL at 11:24

## 2022-01-01 RX ADMIN — Medication 81 MILLIGRAM(S): at 13:56

## 2022-01-01 RX ADMIN — CHLORHEXIDINE GLUCONATE 15 MILLILITER(S): 213 SOLUTION TOPICAL at 05:39

## 2022-01-01 RX ADMIN — Medication 12.5 MILLIGRAM(S): at 17:10

## 2022-01-01 RX ADMIN — MIDODRINE HYDROCHLORIDE 5 MILLIGRAM(S): 2.5 TABLET ORAL at 17:08

## 2022-01-01 RX ADMIN — SODIUM CHLORIDE 75 MILLILITER(S): 9 INJECTION, SOLUTION INTRAVENOUS at 17:11

## 2022-01-01 RX ADMIN — MIDODRINE HYDROCHLORIDE 5 MILLIGRAM(S): 2.5 TABLET ORAL at 12:13

## 2022-01-01 RX ADMIN — LEVETIRACETAM 500 MILLIGRAM(S): 250 TABLET, FILM COATED ORAL at 17:54

## 2022-01-01 RX ADMIN — Medication 1 DROP(S): at 17:11

## 2022-01-01 RX ADMIN — ERGOCALCIFEROL 8000 UNIT(S): 1.25 CAPSULE ORAL at 13:55

## 2022-01-01 RX ADMIN — HEPARIN SODIUM 5000 UNIT(S): 5000 INJECTION INTRAVENOUS; SUBCUTANEOUS at 17:08

## 2022-01-01 RX ADMIN — ATORVASTATIN CALCIUM 80 MILLIGRAM(S): 80 TABLET, FILM COATED ORAL at 21:53

## 2022-01-01 RX ADMIN — CHLORHEXIDINE GLUCONATE 15 MILLILITER(S): 213 SOLUTION TOPICAL at 06:19

## 2022-01-01 RX ADMIN — ERGOCALCIFEROL 8000 UNIT(S): 1.25 CAPSULE ORAL at 11:50

## 2022-01-01 RX ADMIN — TAMSULOSIN HYDROCHLORIDE 0.4 MILLIGRAM(S): 0.4 CAPSULE ORAL at 22:08

## 2022-01-01 RX ADMIN — APIXABAN 5 MILLIGRAM(S): 2.5 TABLET, FILM COATED ORAL at 17:10

## 2022-01-01 RX ADMIN — CHLORHEXIDINE GLUCONATE 15 MILLILITER(S): 213 SOLUTION TOPICAL at 18:33

## 2022-01-01 RX ADMIN — SODIUM CHLORIDE 100 MILLILITER(S): 9 INJECTION, SOLUTION INTRAVENOUS at 21:32

## 2022-01-01 RX ADMIN — MIDODRINE HYDROCHLORIDE 5 MILLIGRAM(S): 2.5 TABLET ORAL at 05:16

## 2022-01-01 RX ADMIN — Medication 1 PUFF(S): at 20:29

## 2022-01-01 RX ADMIN — ERGOCALCIFEROL 8000 UNIT(S): 1.25 CAPSULE ORAL at 11:34

## 2022-01-01 RX ADMIN — HUMAN INSULIN 13 UNIT(S): 100 INJECTION, SUSPENSION SUBCUTANEOUS at 23:38

## 2022-01-01 RX ADMIN — Medication 1 MILLIGRAM(S): at 11:33

## 2022-01-01 RX ADMIN — CEFTRIAXONE 100 MILLIGRAM(S): 500 INJECTION, POWDER, FOR SOLUTION INTRAMUSCULAR; INTRAVENOUS at 09:51

## 2022-01-01 RX ADMIN — FAMOTIDINE 20 MILLIGRAM(S): 10 INJECTION INTRAVENOUS at 05:35

## 2022-01-01 RX ADMIN — Medication 1 DROP(S): at 11:33

## 2022-01-01 RX ADMIN — Medication 300 MILLIGRAM(S): at 12:06

## 2022-01-01 RX ADMIN — Medication 2: at 05:05

## 2022-01-01 RX ADMIN — MIDODRINE HYDROCHLORIDE 5 MILLIGRAM(S): 2.5 TABLET ORAL at 17:10

## 2022-01-01 RX ADMIN — Medication 1 PUFF(S): at 14:57

## 2022-01-01 NOTE — PROGRESS NOTE ADULT - SUBJECTIVE AND OBJECTIVE BOX
CC: f/u for recurrent C Diff    Patient reports: he is non verbal, appears comfortable on TC    REVIEW OF SYSTEMS:  All other review of systems negative (Comprehensive ROS): teixeira in place, rectal tube and peg    Antimicrobials Day #  :day   vancomycin    Solution 125 milliGRAM(s) Oral every 6 hours    Other Medications Reviewed  MEDICATIONS  (STANDING):  acetylcysteine 20%  Inhalation 4 milliLiter(s) Inhalation every 6 hours  albuterol/ipratropium for Nebulization 3 milliLiter(s) Nebulizer every 6 hours  apixaban 5 milliGRAM(s) Oral two times a day  artificial  tears Solution 1 Drop(s) Both EYES every 6 hours  aspirin  chewable 81 milliGRAM(s) Oral daily  atorvastatin 40 milliGRAM(s) Oral at bedtime  chlorhexidine 0.12% Liquid 15 milliLiter(s) Oral Mucosa two times a day  dextrose 50% Injectable 25 Gram(s) IV Push once  dextrose 50% Injectable 12.5 Gram(s) IV Push once  doxazosin 2 milliGRAM(s) Oral daily  insulin lispro (ADMELOG) corrective regimen sliding scale   SubCutaneous every 6 hours  insulin NPH human recombinant 13 Unit(s) SubCutaneous every 6 hours  metoprolol tartrate 12.5 milliGRAM(s) Oral two times a day  multivitamin 1 Tablet(s) Oral daily  pantoprazole  Injectable 40 milliGRAM(s) IV Push daily  sodium chloride 3%  Inhalation 3 milliLiter(s) Inhalation every 6 hours  vancomycin    Solution 125 milliGRAM(s) Oral every 6 hours    T(F): 100.8 (22 @ 02:32), Max: 100.8 (22 @ 02:32)  HR: 99 (22 @ 02:32)  BP: 127/77 (22 @ 02:32)  RR: 18 (22 @ 02:32)  SpO2: 99% (22 @ 02:32)  Wt(kg): --    PHYSICAL EXAM:  General: lethargic, no acute distress  Eyes:  anicteric, no conjunctival injection, no discharge  Oropharynx: no lesions or injection 	  Neck: supple, trach  Lungs: few ronchi  Heart: regular rate and rhythm; no murmur, rubs or gallops  Abdomen: soft, nondistended, nontender, peg  Skin: no lesions  Extremities: no clubbing, cyanosis, or edema  Neurologic: lethargic and poorly interactive  : teixeira  Rectal tube  LAB RESULTS:                        7.9    13.54 )-----------( 386      ( 31 Dec 2021 09:01 )             24.7     12    133<L>  |  96  |  18  ----------------------------<  101<H>  4.4   |  27  |  0.55    Ca    8.4      31 Dec 2021 07:09  Phos  3.2       Mg     2.0             Urinalysis Basic - ( 30 Dec 2021 06:21 )    Color: Yellow / Appearance: Slightly Turbid / S.016 / pH: x  Gluc: x / Ketone: Negative  / Bili: Negative / Urobili: Negative   Blood: x / Protein: 30 mg/dL / Nitrite: Negative   Leuk Esterase: Large / RBC: 11 /hpf / WBC 92 /HPF   Sq Epi: x / Non Sq Epi: 0 /hpf / Bacteria: Many      MICROBIOLOGY:  RECENT CULTURES:      RADIOLOGY REVIEWED:    < from: Xray Chest 1 View- PORTABLE-Urgent (Xray Chest 1 View- PORTABLE-Urgent .) (. @ 07:32) >  IMPRESSION: Small left pneumothorax. Mild hazy airspace opacity in the   left lower lobe which may represent pneumonia.    --- End of Report --    < end of copied text >

## 2022-01-01 NOTE — PROGRESS NOTE ADULT - SUBJECTIVE AND OBJECTIVE BOX
HPI:  Patient is a 61 year old male that developed headache, dizziness, and vomiting.  CTH done showed posterior fossa hemorrhage.  Admitted to the neurosurgery service for further management.    OVERNIGHT EVENTS:  No acute events overnight.  Neuro exam stable.  Thoracic following for pneumothorax, repeat cxr done.  ID following, on po vanco for c. diff.  Tolerating tube feeds.    Vital Signs Last 24 Hrs  T(C): 36.5 (01 Jan 2022 07:26), Max: 38.2 (01 Jan 2022 04:30)  T(F): 97.7 (01 Jan 2022 07:26), Max: 100.8 (01 Jan 2022 04:30)  HR: 89 (01 Jan 2022 07:26) (89 - 101)  BP: 101/61 (01 Jan 2022 07:26) (101/61 - 127/77)  BP(mean): --  RR: 18 (01 Jan 2022 07:26) (18 - 20)  SpO2: 99% (01 Jan 2022 07:26) (99% - 100%)    I&O's Detail    31 Dec 2021 07:01  -  01 Jan 2022 07:00  --------------------------------------------------------  IN:  Total IN: 0 mL    OUT:    Intermittent Catheterization - Urethral (mL): 1900 mL  Total OUT: 1900 mL    Total NET: -1900 mL        I&O's Summary    31 Dec 2021 07:01  -  01 Jan 2022 07:00  --------------------------------------------------------  IN: 0 mL / OUT: 1900 mL / NET: -1900 mL        PHYSICAL EXAM:  Neurological: opens eyes spontaneously, downward gaze, non-verbal, intermittently follows simple commands (squeezes hands), wiggles fingers RUE spontaneously otherwise 0/5 upper extremities b/l, withdraws to noxious lower extremities b/l    Cardiovascular: +s1, s2  Respiratory: clear to auscultation b/l  Gastrointestinal: soft, non-distended, non-tender  Genitourinary: +intermittent straight cath  Extremities: +dp/pt pulses palpable b/l  Incision/Wound: crani incision well healed c/d/i, right groin puncture site c/d/i, no hematoma, no erythema    TUBES/LINES:  [x] trach  [x] peg  [x] rectal tube    DIET:  [x] tube feeds (glucerna @ 75) via peg, luz marina 2 per day, probiotic yogurt/smoothie 2 per day      LABS:                        7.9    13.54 )-----------( 386      ( 31 Dec 2021 09:01 )             24.7     12-31    133<L>  |  96  |  18  ----------------------------<  101<H>  4.4   |  27  |  0.55    Ca    8.4      31 Dec 2021 07:09  Phos  3.2     12-31  Mg     2.0     12-31            CAPILLARY BLOOD GLUCOSE      POCT Blood Glucose.: 154 mg/dL (01 Jan 2022 04:59)  POCT Blood Glucose.: 169 mg/dL (31 Dec 2021 23:07)  POCT Blood Glucose.: 177 mg/dL (31 Dec 2021 16:42)  POCT Blood Glucose.: 118 mg/dL (31 Dec 2021 11:50)          Allergies    penicillin (Other)          MEDICATIONS:  Antibiotics:  vancomycin    Solution 125 milliGRAM(s) Oral every 6 hours    Neuro:  acetaminophen    Suspension .. 650 milliGRAM(s) Oral every 6 hours PRN    Anticoagulation:  apixaban 5 milliGRAM(s) Oral two times a day  aspirin  chewable 81 milliGRAM(s) Oral daily    OTHER:  acetylcysteine 20%  Inhalation 4 milliLiter(s) Inhalation every 6 hours  albuterol/ipratropium for Nebulization 3 milliLiter(s) Nebulizer every 6 hours  artificial  tears Solution 1 Drop(s) Both EYES every 6 hours  atorvastatin 40 milliGRAM(s) Oral at bedtime  chlorhexidine 0.12% Liquid 15 milliLiter(s) Oral Mucosa two times a day  dextrose 50% Injectable 25 Gram(s) IV Push once  dextrose 50% Injectable 12.5 Gram(s) IV Push once  doxazosin 2 milliGRAM(s) Oral daily  insulin lispro (ADMELOG) corrective regimen sliding scale   SubCutaneous every 6 hours  insulin NPH human recombinant 13 Unit(s) SubCutaneous every 6 hours  metoprolol tartrate 12.5 milliGRAM(s) Oral two times a day  pantoprazole  Injectable 40 milliGRAM(s) IV Push daily  sodium chloride 3%  Inhalation 3 milliLiter(s) Inhalation every 6 hours    IVF:  multivitamin 1 Tablet(s) Oral daily    CULTURES:  Culture Results:   No growth (12-23 @ 17:21)  Culture Results:   GI PCR Results: NOT detected  *******Please Note:*******  GI panel PCR evaluates for:  Campylobacter, Plesiomonas shigelloides, Salmonella,  Vibrio, Yersinia enterocolitica, Enteroaggregative  Escherichia coli (EAEC), Enteropathogenic E.coli (EPEC),  Enterotoxigenic E. coli (ETEC) lt/st, Shiga-like  toxin-producing E. coli (STEC) stx1/stx2,  Shigella/ Enteroinvasive E. coli (EIEC), Cryptosporidium,  Cyclospora cayetanensis, Entamoeba histolytica,  Giardia lamblia, Adenovirus F 40/41, Astrovirus,  Norovirus GI/GII, Rotavirus A, Sapovirus (12-23 @ 15:00)    RADIOLOGY & ADDITIONAL TESTS:  cxr done - final read pending

## 2022-01-01 NOTE — PROGRESS NOTE ADULT - ASSESSMENT
60 yo male with PMH of HTN   Admitted on 11/4 with severe headache and found to have cerebellar bleed.  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  S/p trach and peg.   He has distal DVT's - on apixaban.  He received cefepime 11/4-11/21 for respiratory coverage.  Developed watery C diff diarrhea on 11/26 - started on oral vancomycin   Then restarted on cefepime on 11/28 for concerns of pneumonia since had low grade temps. Giorgio neb added later  But CXR's remained clear.  Ct of brain showed residual blood.  Pseudomonas in sputum found to be resistant to carbapenems & quinolones. Sputum isolated strep pneumoniae as well    11/28 urine and blood cultures are negative.  Cefepime completed on 12/06 & Giorgio nebs on 12/07/21  Failed TOV & teixeira was replaced for retention of 1000 ml of urine on 12/06/21 PM  Spiked a fever to 102F ? in response to retention  UA with 3 WBCs, no nit or LE, large blood   CXR with clear lungs  CTH revealed a Pseudomeningocele, s/p tap with gram stain without organism  CSF finalized as no growth  Blood & urine cx also finalized as no growth  Completed treatment for C diff w 2 wks of oral vanco on 12/10/21  Diarrhea resolved  He developed recurrent fever and relapsed with C diff  Vanco restarted on 12/22  He has a rectal tube in place.  At high risk for aspiration and  infections given history of retention and poor mental status.  His fecal output has decreased  He still requires ISC  CXR 12/31 with small left PTX  pyuria acceptable with ISC, he now has a teixeira  CXR read as possible LLL infiltrate  Low grade fever but is otherwise clinically stable  Suggest:  1. Vanco x 3 more days  than slow taper , 125 tid x 1 week, 125 BID x 1 week, 125 daily x 1 week, and 125 every other day x 2 weeks  2.I would hold off on additional antibiotics even if he has a positive urine culture, we would consider this colonization of bladder  2.I think we should monitor low grade temp conservatively.He is chronically ill and is on second course of C Diff therapy.If fever escalates will advise cultures of blood, urine, and sputum and consider additional antibiotics.  3.additional ID w/u as necessary

## 2022-01-01 NOTE — PROGRESS NOTE ADULT - SUBJECTIVE AND OBJECTIVE BOX
Subjective: Unable to obtain - pt sleeping, not opening eyes to verbal stimuli    PAST MEDICAL & SURGICAL HISTORY:  HTN (hypertension)  Diabetes mellitus                  MEDICATIONS  acetaminophen Suspension 650 milliGRAM(s) Enteral Tube every 6 hours PRN  acetylcysteine 20%  Inhalation 4 milliLiter(s) Inhalation every 6 hours  albuterol/ipratropium for Nebulization 3 milliLiter(s) Nebulizer every 6 hours  apixaban 5 milliGRAM(s) Enteral Tube every 12 hours  artificial  tears Solution 1 Drop(s) Both EYES every 6 hours  aspirin  chewable 81 milliGRAM(s) Oral daily  atorvastatin 40 milliGRAM(s) Oral at bedtime  chlorhexidine 0.12% Liquid 15 milliLiter(s) Oral Mucosa two times a day  doxazosin 2 milliGRAM(s) Oral daily  insulin lispro (ADMELOG) corrective regimen sliding scale SubCutaneous every 6 hours  insulin NPH human recombinant 13 Unit(s) SubCutaneous every 6 hours  metoprolol tartrate 12.5 milliGRAM(s) Oral two times a day  multivitamin 1 Tablet(s) Oral daily  pantoprazole Injectable 40 milliGRAM(s) IV Push daily  sodium chloride 3%  Inhalation 3 milliLiter(s) Inhalation every 6 hours  vancomycin Solution 125 milliGRAM(s) Oral every 6 hours      Vital Signs Last 24 Hrs  T(C): 36.5 (01 Jan 2022 07:26), Max: 38.2 (01 Jan 2022 04:30)  T(F): 97.7 (01 Jan 2022 07:26), Max: 100.8 (01 Jan 2022 04:30)  HR: 89 (01 Jan 2022 07:26) (89 - 101)  BP: 101/61 (01 Jan 2022 07:26) (101/61 - 127/77)  RR: 18 (01 Jan 2022 07:26) (18 - 20)  SpO2: 99% (01 Jan 2022 07:26) (99% - 100%)      PHYSICAL EXAM:  Neurology: unable to obtain - pt sleeping, not opening eyes to verbal stimuli  CV: RRR, (+)S1/S2  Lungs: CTA b/l, on trach collar    CT: None     CXR: small L PTX appears improved from prior     LABS  12-31    133<L>  |  96  |  18  ----------------------------<  101<H>  4.4   |  27  |  0.55    Ca    8.4      31 Dec 2021 07:09  Phos  3.2     12-31  Mg     2.0     12-31                                 7.9    13.54 )-----------( 386      ( 31 Dec 2021 09:01 )             24.7

## 2022-01-01 NOTE — PROGRESS NOTE ADULT - ASSESSMENT
61M admitted with ICH s/p PICA aneurysm resection c/b extended hospital course and poor mental status requiring trach/peg, DNR status, recently dislodged trach this AM found to have small L pneumothorax. Thoracic surgery consulted.    12/22 CXR no pneumothorax  12/31 CXR x 2 with small, stable L PTX. D/w thoracic surgery attending on call Dr. Calvillo - No acute thoracic surgery intervention at this time. Daily CXR to monitor. If change in respiratory status or vitals, recommend STAT CXR  1/1 VSS, today's CXR stable. D/w Dr. Calvillo - no indication for pigtail at this time. Continue to monitor w/ daily CXRs.

## 2022-01-01 NOTE — PROGRESS NOTE ADULT - ASSESSMENT
PROCEDURE: s/p suboccipital crani for posterior fossa decompression on 11/4/2021, s/p cerebral angiogram showing left PICA aneurysm on 11/5/2021, s/p cerebral angiogram showing left PICA fistula on 11/9/2021, s/p crani for PICA aneurysm resection on 11/10/2021, s/p insertion of right parietal ventriculoperitoneal shunt (Certas @ 4) on 11/23/2021  POD#58, #52, #39  PAD#57, #53    PLAN:  Neuro:   -q4 hour neuro checks  -tylenol for pain control and fevers  -out of bed with assistance  -palliative consult placed for goals of care, follow up recs  -pt/ot - subacute rehab upon discharge    Respiratory:   -on trach collar, satting well  -follow up am cxr read, thoracic following  -continuous pulse ox  -continue duonebs, mucomyst, and 3% inhalations    CV:  -keep sbp 100-140  -asa 81 for hx of cad  -metoprolol for bp control  -atorvastatin for lipid control    Endocrine:   -nph and JOSE MARTIN for glucose control  -keep fingersticks 100-180    Heme/Onc:    -eliquis for lower extremity dvts    Renal:   -intermittent straight cath  -outpatient urology follow up after discharge for bladder lesion as per hospitalist    ID:   -c. diff, on po vanco  -ID following    GI:   -tube feeds (glucerna @ 75) via peg, luz marina 2 per day, probiotic yogurt/smoothie 2 per day  -protonix for gi prophylaxis  -continue rectal tube      Spectra #[id number]

## 2022-01-02 LAB
-  AMIKACIN: SIGNIFICANT CHANGE UP
-  AMOXICILLIN/CLAVULANIC ACID: SIGNIFICANT CHANGE UP
-  AMPICILLIN/SULBACTAM: SIGNIFICANT CHANGE UP
-  AMPICILLIN: SIGNIFICANT CHANGE UP
-  AZTREONAM: SIGNIFICANT CHANGE UP
-  CEFAZOLIN: SIGNIFICANT CHANGE UP
-  CEFEPIME: SIGNIFICANT CHANGE UP
-  CEFOXITIN: SIGNIFICANT CHANGE UP
-  CEFTRIAXONE: SIGNIFICANT CHANGE UP
-  CIPROFLOXACIN: SIGNIFICANT CHANGE UP
-  ERTAPENEM: SIGNIFICANT CHANGE UP
-  GENTAMICIN: SIGNIFICANT CHANGE UP
-  IMIPENEM: SIGNIFICANT CHANGE UP
-  LEVOFLOXACIN: SIGNIFICANT CHANGE UP
-  MEROPENEM: SIGNIFICANT CHANGE UP
-  NITROFURANTOIN: SIGNIFICANT CHANGE UP
-  PIPERACILLIN/TAZOBACTAM: SIGNIFICANT CHANGE UP
-  TIGECYCLINE: SIGNIFICANT CHANGE UP
-  TOBRAMYCIN: SIGNIFICANT CHANGE UP
-  TRIMETHOPRIM/SULFAMETHOXAZOLE: SIGNIFICANT CHANGE UP
ANION GAP SERPL CALC-SCNC: 12 MMOL/L — SIGNIFICANT CHANGE UP (ref 5–17)
BUN SERPL-MCNC: 22 MG/DL — SIGNIFICANT CHANGE UP (ref 7–23)
CALCIUM SERPL-MCNC: 8.7 MG/DL — SIGNIFICANT CHANGE UP (ref 8.4–10.5)
CHLORIDE SERPL-SCNC: 96 MMOL/L — SIGNIFICANT CHANGE UP (ref 96–108)
CO2 SERPL-SCNC: 28 MMOL/L — SIGNIFICANT CHANGE UP (ref 22–31)
CREAT SERPL-MCNC: 0.54 MG/DL — SIGNIFICANT CHANGE UP (ref 0.5–1.3)
CULTURE RESULTS: SIGNIFICANT CHANGE UP
GLUCOSE SERPL-MCNC: 105 MG/DL — HIGH (ref 70–99)
HCT VFR BLD CALC: 25.9 % — LOW (ref 39–50)
HGB BLD-MCNC: 8 G/DL — LOW (ref 13–17)
MCHC RBC-ENTMCNC: 29.1 PG — SIGNIFICANT CHANGE UP (ref 27–34)
MCHC RBC-ENTMCNC: 30.9 GM/DL — LOW (ref 32–36)
MCV RBC AUTO: 94.2 FL — SIGNIFICANT CHANGE UP (ref 80–100)
METHOD TYPE: SIGNIFICANT CHANGE UP
NRBC # BLD: 0 /100 WBCS — SIGNIFICANT CHANGE UP (ref 0–0)
ORGANISM # SPEC MICROSCOPIC CNT: SIGNIFICANT CHANGE UP
ORGANISM # SPEC MICROSCOPIC CNT: SIGNIFICANT CHANGE UP
PLATELET # BLD AUTO: 359 K/UL — SIGNIFICANT CHANGE UP (ref 150–400)
POTASSIUM SERPL-MCNC: 4.5 MMOL/L — SIGNIFICANT CHANGE UP (ref 3.5–5.3)
POTASSIUM SERPL-SCNC: 4.5 MMOL/L — SIGNIFICANT CHANGE UP (ref 3.5–5.3)
RBC # BLD: 2.75 M/UL — LOW (ref 4.2–5.8)
RBC # FLD: 13.2 % — SIGNIFICANT CHANGE UP (ref 10.3–14.5)
SODIUM SERPL-SCNC: 136 MMOL/L — SIGNIFICANT CHANGE UP (ref 135–145)
SPECIMEN SOURCE: SIGNIFICANT CHANGE UP
WBC # BLD: 6.67 K/UL — SIGNIFICANT CHANGE UP (ref 3.8–10.5)
WBC # FLD AUTO: 6.67 K/UL — SIGNIFICANT CHANGE UP (ref 3.8–10.5)

## 2022-01-02 PROCEDURE — 99232 SBSQ HOSP IP/OBS MODERATE 35: CPT

## 2022-01-02 PROCEDURE — 71045 X-RAY EXAM CHEST 1 VIEW: CPT | Mod: 26

## 2022-01-02 RX ADMIN — Medication 4 MILLILITER(S): at 17:45

## 2022-01-02 RX ADMIN — PANTOPRAZOLE SODIUM 40 MILLIGRAM(S): 20 TABLET, DELAYED RELEASE ORAL at 11:15

## 2022-01-02 RX ADMIN — CHLORHEXIDINE GLUCONATE 15 MILLILITER(S): 213 SOLUTION TOPICAL at 05:06

## 2022-01-02 RX ADMIN — SODIUM CHLORIDE 3 MILLILITER(S): 9 INJECTION INTRAMUSCULAR; INTRAVENOUS; SUBCUTANEOUS at 05:08

## 2022-01-02 RX ADMIN — Medication 125 MILLIGRAM(S): at 17:47

## 2022-01-02 RX ADMIN — Medication 81 MILLIGRAM(S): at 11:15

## 2022-01-02 RX ADMIN — HUMAN INSULIN 13 UNIT(S): 100 INJECTION, SUSPENSION SUBCUTANEOUS at 05:08

## 2022-01-02 RX ADMIN — Medication 12.5 MILLIGRAM(S): at 17:45

## 2022-01-02 RX ADMIN — Medication 125 MILLIGRAM(S): at 11:13

## 2022-01-02 RX ADMIN — HUMAN INSULIN 13 UNIT(S): 100 INJECTION, SUSPENSION SUBCUTANEOUS at 17:45

## 2022-01-02 RX ADMIN — APIXABAN 5 MILLIGRAM(S): 2.5 TABLET, FILM COATED ORAL at 17:46

## 2022-01-02 RX ADMIN — Medication 3 MILLILITER(S): at 05:06

## 2022-01-02 RX ADMIN — SODIUM CHLORIDE 3 MILLILITER(S): 9 INJECTION INTRAMUSCULAR; INTRAVENOUS; SUBCUTANEOUS at 17:47

## 2022-01-02 RX ADMIN — CHLORHEXIDINE GLUCONATE 15 MILLILITER(S): 213 SOLUTION TOPICAL at 17:46

## 2022-01-02 RX ADMIN — SODIUM CHLORIDE 3 MILLILITER(S): 9 INJECTION INTRAMUSCULAR; INTRAVENOUS; SUBCUTANEOUS at 11:13

## 2022-01-02 RX ADMIN — Medication 4 MILLILITER(S): at 11:14

## 2022-01-02 RX ADMIN — APIXABAN 5 MILLIGRAM(S): 2.5 TABLET, FILM COATED ORAL at 05:06

## 2022-01-02 RX ADMIN — Medication 1 DROP(S): at 11:16

## 2022-01-02 RX ADMIN — Medication 125 MILLIGRAM(S): at 05:07

## 2022-01-02 RX ADMIN — HUMAN INSULIN 13 UNIT(S): 100 INJECTION, SUSPENSION SUBCUTANEOUS at 11:21

## 2022-01-02 RX ADMIN — Medication 2 MILLIGRAM(S): at 05:07

## 2022-01-02 RX ADMIN — Medication 3 MILLILITER(S): at 11:15

## 2022-01-02 RX ADMIN — Medication 3 MILLILITER(S): at 17:45

## 2022-01-02 RX ADMIN — Medication 1 DROP(S): at 17:47

## 2022-01-02 RX ADMIN — Medication 1 DROP(S): at 05:08

## 2022-01-02 RX ADMIN — Medication 4 MILLILITER(S): at 05:07

## 2022-01-02 RX ADMIN — Medication 1 TABLET(S): at 11:16

## 2022-01-02 RX ADMIN — Medication 12.5 MILLIGRAM(S): at 05:07

## 2022-01-02 RX ADMIN — ATORVASTATIN CALCIUM 40 MILLIGRAM(S): 80 TABLET, FILM COATED ORAL at 21:47

## 2022-01-02 NOTE — PROGRESS NOTE ADULT - NSPROGADDITIONALINFOA_GEN_ALL_CORE
Prolonged hospital stay, poor functional and mental status. Overall prognosis remains guarded. Will continue GOC discussions w family. Palliative to follow. Prolonged hospital stay, poor functional and mental status. Overall prognosis remains guarded. Would continue GOC discussions w family. Palliative to follow.

## 2022-01-02 NOTE — PROGRESS NOTE ADULT - SUBJECTIVE AND OBJECTIVE BOX
SUBJECTIVE: Pt seen and examined, appears more alert this morning, eyes open with baseline downward gaze, afebrile overnight, daily CXR done this morning for pneumothorax - will follow up results    OVERNIGHT EVENTS: none    Vital Signs Last 24 Hrs  T(C): 37.1 (02 Jan 2022 07:28), Max: 37.1 (01 Jan 2022 16:40)  T(F): 98.8 (02 Jan 2022 07:28), Max: 98.8 (01 Jan 2022 16:40)  HR: 86 (02 Jan 2022 07:28) (86 - 97)  BP: 118/73 (02 Jan 2022 07:28) (103/67 - 120/74)  BP(mean): --  RR: 18 (02 Jan 2022 07:28) (18 - 18)  SpO2: 100% (02 Jan 2022 07:28) (99% - 100%)    PHYSICAL EXAM:    General: No Acute Distress     Neurological: Awake, eyes open with downward gaze,  trach collar, intermittently FC (squeezes hand), UE 0/5, b/l LE wds    Pulmonary: Clear to Auscultation, No Rales, No Rhonchi, No Wheezes     Cardiovascular: S1, S2, Regular Rate and Rhythm     Gastrointestinal: Soft, Nontender, Nondistended     Incision: healed    LABS:                        8.0    6.67  )-----------( 359      ( 02 Jan 2022 06:47 )             25.9    01-02    136  |  96  |  22  ----------------------------<  105<H>  4.5   |  28  |  0.54    Ca    8.7      02 Jan 2022 06:46          01-01 @ 07:01  -  01-02 @ 07:00  --------------------------------------------------------  IN: 900 mL / OUT: 2350 mL / NET: -1450 mL      DRAINS: none    MEDICATIONS:  Antibiotics:  vancomycin    Solution 125 milliGRAM(s) Oral every 6 hours    Neuro:  acetaminophen    Suspension .. 650 milliGRAM(s) Enteral Tube every 6 hours PRN Temp greater or equal to 38C (100.4F), Mild Pain (1 - 3)    Cardiac:  doxazosin 2 milliGRAM(s) Oral daily  metoprolol tartrate 12.5 milliGRAM(s) Oral two times a day    Pulm:  acetylcysteine 20%  Inhalation 4 milliLiter(s) Inhalation every 6 hours  albuterol/ipratropium for Nebulization 3 milliLiter(s) Nebulizer every 6 hours  sodium chloride 3%  Inhalation 3 milliLiter(s) Inhalation every 6 hours    GI/:  pantoprazole  Injectable 40 milliGRAM(s) IV Push daily    Other:   apixaban 5 milliGRAM(s) Enteral Tube every 12 hours  artificial  tears Solution 1 Drop(s) Both EYES every 6 hours  aspirin  chewable 81 milliGRAM(s) Oral daily  atorvastatin 40 milliGRAM(s) Oral at bedtime  chlorhexidine 0.12% Liquid 15 milliLiter(s) Oral Mucosa two times a day  dextrose 50% Injectable 25 Gram(s) IV Push once  dextrose 50% Injectable 12.5 Gram(s) IV Push once  insulin lispro (ADMELOG) corrective regimen sliding scale   SubCutaneous every 6 hours  insulin NPH human recombinant 13 Unit(s) SubCutaneous every 6 hours  multivitamin 1 Tablet(s) Oral daily    DIET: [] Regular [] CCD [] Renal [] Puree [] Dysphagia [x] Tube Feeds:     IMAGING:   < from: Xray Chest 1 View- PORTABLE-Urgent (Xray Chest 1 View- PORTABLE-Urgent .) (12.31.21 @ 07:32) >    INTERPRETATION:  stable small Left pneumothorax.  follow up official report.    < end of copied text >  < from: Xray Chest 1 View- PORTABLE-Urgent (Xray Chest 1 View- PORTABLE-Urgent .) (12.31.21 @ 04:52) >    INTERPRETATION:  new small Left pneumothorax.  discussed with Dr Kelley of neurosurgery.  follow up official report.    < end of copied text >   from: CT Abdomen and Pelvis w/ IV Cont (12.16.21 @ 09:26) >    IMPRESSION:  A 2.4 cm nodular soft tissue density in the bladder appears separate from   the prostate gland, concerning for bladder lesion. Correlate with   cystoscopy.    < from: MR Head w/wo IV Cont (12.23.21 @ 23:50) >    IMPRESSION: Postop changes are again identified with extra axial   collection seen in the postoperative region. This could be compatible   with pseudomeningocele, though the possibility of abscess or dural leak   cannot entirely excluded. Clinical correlation and continued close   interval is recommended.    Small foci of abnormal restricted diffusion is seen involving the right   centrum semiovale and medial right frontal cortex. These findings are   likely compatible with acute lacunar infarcts.    < end of copied text >  A 2.7 cm fluid collection adjacent to the right ischial tuberosity, may   be related to ischiogluteal bursitis.    Indeterminant 1.7 cm left renal lesion. Contrast-enhanced MRI can be   performed further evaluation.    Mild fatty infiltration along the PEG tube catheter without drainable   fluid collection.    < end of copied text >  < from: CT Head No Cont (12.12.21 @ 15:38) >  IMPRESSION:    Similar ventricular size when compared to 12/9/2021.    < end of copied text >  < from: CT Head No Cont (12.07.21 @ 11:42) >  IMPRESSION: Left suboccipital craniectomy and cerebellar postoperative changes. Right parietal  shunt catheter. Moderate enlargement of the ventricles compared with 11/26/2021..    < end of copied text >  < from: MR Head w/wo IV Cont (12.23.21 @ 23:50) >  IMPRESSION: Postop changes are again identified with extra axial   collection seen in the postoperative region. This could be compatible   with pseudomeningocele, though the possibility of abscess or dural leak   cannot entirely excluded. Clinical correlation and continued close   interval is recommended.    Small foci of abnormal restricted diffusion is seen involving the right   centrum semiovale and medial right frontal cortex. These findings are   likely compatible with acute lacunar infarcts.    --- End of Report ---    < end of copied text >  < from: VA Duplex Lower Ext Vein Scan, Bilat (12.08.21 @ 12:21) >    IMPRESSION:    Persistent bilateral below the knees deep venous thrombosis in the   bilateral calf veins. No propagation.    < end of copied text >    < from: Xray Chest 1 View- PORTABLE-Urgent (Xray Chest 1 View- PORTABLE-Urgent .) (01.01.22 @ 06:00) >  IMPRESSION: Small left pneumothorax, unchanged. Stable mild haziness in   the left upper lobe which may represent pneumonia.    < end of copied text >

## 2022-01-02 NOTE — PROGRESS NOTE ADULT - PROBLEM SELECTOR PLAN 11
CT a/p with a 2.4 cm nodular soft tissue density in the bladder appears separate from the prostate gland, concerning for bladder lesion  - as per urology - findings concerning for bladder tumor vs prostate gland. cysto transurethral resection would require general anesthesia. recommended checking urine cytology to determine next steps which returned negative. will still need outpatient urology follow-up regardless once acute issues improve  - PSA normal, urine cytology negative for malignancy  - previous hospitalist spoke to the patient's wife, Sandrita Bauer [phone]. Given the patient's underlying comorbid conditions coupled with his recent devastating neurologic events, other medical complications that ensued, and his poor neurologic and functional status at this time, it may be prudent to monitor his progress and see if he makes any meaningful recovery over the coming weeks to months to decide whether the benefit of pursuing any invasive work up for the bladder lesion outweighs the risk. Spouse seems to be in agreement at the moment.  - Will require eventual outpatient f/u with Urology

## 2022-01-02 NOTE — PROGRESS NOTE ADULT - SUBJECTIVE AND OBJECTIVE BOX
CC: f/u for C Diff and fever    Patient reports: he appears comfortable, is non verbal    REVIEW OF SYSTEMS:  All other review of systems negative (Comprehensive ROS)    Antimicrobials Day #  :day 12/14  vancomycin    Solution 125 milliGRAM(s) Oral every 6 hours    Other Medications Reviewed    T(F): 98.7 (01-02-22 @ 04:14), Max: 99.1 (01-01-22 @ 06:00)  HR: 97 (01-02-22 @ 04:14)  BP: 120/74 (01-02-22 @ 04:14)  RR: 18 (01-02-22 @ 04:14)  SpO2: 100% (01-02-22 @ 04:14)  Wt(kg): --    PHYSICAL EXAM:  General: alert, no acute distress  Eyes:  anicteric, no conjunctival injection, no discharge  Oropharynx: no lesions or injection 	  Neck: supple, trach collar  Lungs: clear to auscultation  Heart: regular rate and rhythm; no murmur, rubs or gallops  Abdomen: soft, nondistended, nontender, without mass or organomegaly, peg  Skin: no lesions  Extremities: no clubbing, cyanosis, or edema  Neurologic: poorly interactive  : teixeira  rectal tube    LAB RESULTS:                        7.9    13.54 )-----------( 386      ( 31 Dec 2021 09:01 )             24.7     12-31    133<L>  |  96  |  18  ----------------------------<  101<H>  4.4   |  27  |  0.55    Ca    8.4      31 Dec 2021 07:09  Phos  3.2     12-31  Mg     2.0     12-31          MICROBIOLOGY:  RECENT CULTURES:  12-30 @ 18:25 Catheterized Catheterized     >100,000 CFU/ml Escherichia coli          RADIOLOGY REVIEWED:  < from: Xray Chest 1 View- PORTABLE-Urgent (Xray Chest 1 View- PORTABLE-Urgent .) (01.01.22 @ 06:00) >  IMPRESSION: Small left pneumothorax, unchanged. Stable mild haziness in   the left upper lobe which may represent pneumonia.    --- End of Report     < end of copied text >

## 2022-01-02 NOTE — PROGRESS NOTE ADULT - PROBLEM SELECTOR PLAN 2
completed course of PO vanco previously.  developed recurrent fever and diarrhea. found to have recurrent c. diff infeciton.  - rectal tube re-inserted, likely can remove in coming days as output decreasing  - c/w PO vanco started on 12/22 with plan for 14 days of therapy and month long taper  - on day 15: vanco PO TID x 7d, PO BID x 7d, daily x 7 days, and QOD x 2 weeks as per ID recs  - would avoid abx therapy unless absolutely necessary as will exacerbate his c. diff

## 2022-01-02 NOTE — PROGRESS NOTE ADULT - SUBJECTIVE AND OBJECTIVE BOX
Patient is a 61y old  Male who presents with a chief complaint of IVH/SAH (02 Jan 2022 09:42)      SUBJECTIVE / OVERNIGHT EVENTS: Fever 100.8 yesterday am, no recurrence since. Pt remains non-verbal, eyes open this am.     MEDICATIONS  (STANDING):  acetylcysteine 20%  Inhalation 4 milliLiter(s) Inhalation every 6 hours  albuterol/ipratropium for Nebulization 3 milliLiter(s) Nebulizer every 6 hours  apixaban 5 milliGRAM(s) Enteral Tube every 12 hours  artificial  tears Solution 1 Drop(s) Both EYES every 6 hours  aspirin  chewable 81 milliGRAM(s) Oral daily  atorvastatin 40 milliGRAM(s) Oral at bedtime  chlorhexidine 0.12% Liquid 15 milliLiter(s) Oral Mucosa two times a day  dextrose 50% Injectable 12.5 Gram(s) IV Push once  dextrose 50% Injectable 25 Gram(s) IV Push once  doxazosin 2 milliGRAM(s) Oral daily  insulin lispro (ADMELOG) corrective regimen sliding scale   SubCutaneous every 6 hours  insulin NPH human recombinant 13 Unit(s) SubCutaneous every 6 hours  metoprolol tartrate 12.5 milliGRAM(s) Oral two times a day  multivitamin 1 Tablet(s) Oral daily  pantoprazole  Injectable 40 milliGRAM(s) IV Push daily  sodium chloride 3%  Inhalation 3 milliLiter(s) Inhalation every 6 hours  vancomycin    Solution 125 milliGRAM(s) Oral every 6 hours    MEDICATIONS  (PRN):  acetaminophen    Suspension .. 650 milliGRAM(s) Enteral Tube every 6 hours PRN Temp greater or equal to 38C (100.4F), Mild Pain (1 - 3)      CAPILLARY BLOOD GLUCOSE      POCT Blood Glucose.: 97 mg/dL (02 Jan 2022 11:20)  POCT Blood Glucose.: 137 mg/dL (02 Jan 2022 05:05)  POCT Blood Glucose.: 129 mg/dL (01 Jan 2022 23:36)  POCT Blood Glucose.: 139 mg/dL (01 Jan 2022 21:13)  POCT Blood Glucose.: 162 mg/dL (01 Jan 2022 17:08)    I&O's Summary    01 Jan 2022 07:01  -  02 Jan 2022 07:00  --------------------------------------------------------  IN: 900 mL / OUT: 2350 mL / NET: -1450 mL        PHYSICAL EXAM:  T(C): 36.6 (01-02-22 @ 13:18), Max: 37.1 (01-01-22 @ 16:40)  HR: 72 (01-02-22 @ 13:18) (72 - 97)  BP: 116/74 (01-02-22 @ 13:18) (103/67 - 120/74)  RR: 18 (01-02-22 @ 13:18) (18 - 18)  SpO2: 96% (01-02-22 @ 13:18) (96% - 100%)  CONSTITUTIONAL: NAD, well-developed, well-groomed  EYES: PERRLA; conjunctiva and sclera clear  ENMT: Moist oral mucosa, no pharyngeal injection or exudates; normal dentition  NECK: Trach in place  RESPIRATORY: Normal respiratory effort; good air entry anteriorly  CARDIOVASCULAR: Regular rate and rhythm, normal S1 and S2, no murmur/rub/gallop;  ABDOMEN: Nontender to palpation, normoactive bowel sounds, PEG in place  MUSCULOSKELETAL:  No clubbing or cyanosis of digits; no joint swelling or tenderness to palpation  PSYCH: Not responsive  NEUROLOGY: Unable to follow commands  SKIN: No rashes; no palpable lesions    LABS:                        8.0    6.67  )-----------( 359      ( 02 Jan 2022 06:47 )             25.9     01-02    136  |  96  |  22  ----------------------------<  105<H>  4.5   |  28  |  0.54    Ca    8.7      02 Jan 2022 06:46        RADIOLOGY & ADDITIONAL TESTS:    Imaging Personally Reviewed:    Consultant(s) Notes Reviewed:      Care Discussed with Consultants/Other Providers: Carolyn

## 2022-01-02 NOTE — PROGRESS NOTE ADULT - PROBLEM SELECTOR PLAN 1
Per ID- monitor low grade temp conservatively. If fever escalates, cx blood, urine, and sputum. E coli in urine can likely colonizer. Per ID- monitor low grade temp conservatively. If fever escalates, cx blood, urine, and sputum. E coli in urine likely colonizer.

## 2022-01-02 NOTE — PROGRESS NOTE ADULT - ASSESSMENT
61M with PMH of HTN, CAD s/p stent on DAPT, T2DM who complained of headache and subsequently became unresponsive found to have posterior fossa hemorrhage, IVH, hydrocephalus, s/p EVD and SOC on 11/4. 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 Course c/b respiratory failure and dysphagia s/p trach/PEG (11/19), VPS (11/23),Certas @4 completed course of cefepime for enterobacter and pseudomonas pneumonia on 11/21, fevers, c. diff colitis, urinary retention, b/l distal DVT. Trach changed 12/15 to #7 cuffless due to dislodgement. 12/16 CT A/P wo concern for infectious process. but concern for bladder lesion, wife will pursue possible treatment after rehab. Now with another bout of cdiff found after fever workup, on PO vanco per ID, blood cultures negative to date. +Rectal tube. 12/31 Rectal tube replaced for leaking, Trach became dislodged and replaced by ENT. CXR shows small left pneumothorax. Repeat CXR shows stable small left pneumothorax (no intervention)    PLAN:  Neuro:   - shunt  Certas @4  - PICA aneurysm resection  - pt is DNR  - Palliative consult pending    Respiratory:   - trach replaced by ENT for dislodgement  - CXR shows small left pneumothorax, repeat CXR is stable small left pneumothorax, evaluated by Gen Surg- no intervention at this time  - continue duoneb and mucomyst  - trach care, suction prn  - PE/DVT- on apixaban  CV:  - CAD - hx of stent- on ASA 81 mg   - acute post op blood loss anemia improved after transfusion PRBC 12/22  - HTN- continue metoprolol  - on doxazosin for urinary retention  Endocrine:   - DMT2- continue fingersticks with NPH 13u q6  Heme:  - H/H is 8.0/25.9- stable anemia  DVT ppx:   - on eliquis for dvt  Renal:   - hypernatremia- Na 136, continue free water 200q6  - MICHELLE has resolved  -Urinary retention- requiring straight cath q6  - Urology following for renal lesion, PSA/Cytology pending, GOC necessary for any urologic intervention  - f/u urine cytology is negative for high grade urothelial carcinoma  ID:   - ID following: if high temp- send cultures, monitor fever,   - recurrent fever and diarrhea- cdiff again positive, PO vanco restarted (completed course earlier in stay)- ID recs appreciated, continue vanc 2 more days then slow taper- 125 mg tid x 1 week then 125 mg bid x 1 week then 125 mg daily x 1 week  - MRI as above  - follow up CT scan of C/A/P to look for occult source of infection, found to have 2.4 cm nodular soft tissue density, concerning for bladder lesion, 1.7 cm left renal lesion  - CTX for pulmonary coverage if he deteriorates per ID  - c/w PO vanco started 12/22 with plan for 14 days of therapy then month long taper  - if spikes again, may need LP or 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  GI:    - tolerating tube feeds via PEG  - rectal tube in place  PT/OT:   - CHUYITA    Per Hospitalist note, it may be feasible at this time to monitor patient's progress for meaningful recovery over weeks, months and then decide whether there is benefit of pursuing workup of bladder lesion. Spouse in agreement.    Spectralink # [id number]

## 2022-01-02 NOTE — PROGRESS NOTE ADULT - ASSESSMENT
61 year old man with respiratory failure d/t ICH    1. ICH  -per neurosurgery  -s/p  shunt  -MRI done, as per neuro    2. Respiratory failure  -for tracheostomy, will require long term enteral nutrition  -s/p PEG, continue TF and use of abdominal binder  - aspiration precautions     3. Enterobacter PNA  -s/p course of antibiotics     4. Anemia, no overt GI bleed. EGD unremarkable.   -hgb stable  -monitor for GI bleeding     5. Cdiff infection   -on vanco taper  -continue banatrol in feeds  -100cc loose stools rectal tube drainage bag, improving    6. DVT on a/c  -apixaban 5mg resumed  -continue PPI     7. Pneumothorax  -per CTS    Attending supervision statement: I have personally seen and examined the patient. I fully participated in the care of this patient. I have made amendments to the documentation where necessary, and agree with the history, physical exam, and plan as outlined by the ACP.    Attending supervision statement: I have personally seen and examined the patient. I fully participated in the care of this patient. I have made amendments to the documentation where necessary, and agree with the history, physical exam, and plan as outlined by the ACP.        [facility]  Gastroenterology and Hepatology  [address] [place], [place]  Office: [phone]  Cell: [phone]

## 2022-01-02 NOTE — PROGRESS NOTE ADULT - ASSESSMENT
61M with PMH of HTN, CAD s/p stent on DAPT, T2DM aw posterior fossa hemorrhage, IVH, hydrocephalus, s/p EVD and SOC on 11/4. 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     Course c/b respiratory failure and dysphagia s/p trach/PEG (11/19), VPS (11/23), completed course of cefepime for enterobacter and pseudomonas pneumonia on 11/21, fevers, c. diff colitis, urinary retention, b/l distal DVT. New fevers again on 12/22 with diarrhea found to have recurrent c. diff infection on PO vanco.    Trach dislodgement 12/31, replaced by ENT. Small pneumo, stable to repeat XRays, no intervention per CT surgery. Respiratory status currently stable.     Low grade fever 100.8 1/1 am, no recurrence since.

## 2022-01-02 NOTE — CHART NOTE - NSCHARTNOTEFT_GEN_A_CORE
small stable lt ptx   sp trach adjustment,   VSS.  Thoracic surgery to sign off,  call if you need any further recommendations  A Yvon  NP

## 2022-01-02 NOTE — PROGRESS NOTE ADULT - ASSESSMENT
60 yo male with PMH of HTN   Admitted on 11/4 with severe headache and found to have cerebellar bleed.  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  S/p trach and peg.   He has distal DVT's - on apixaban.  He received cefepime 11/4-11/21 for respiratory coverage.  Developed watery C diff diarrhea on 11/26 - started on oral vancomycin   Then restarted on cefepime on 11/28 for concerns of pneumonia since had low grade temps. Giorgio neb added later  But CXR's remained clear.  Ct of brain showed residual blood.  Pseudomonas in sputum found to be resistant to carbapenems & quinolones. Sputum isolated strep pneumoniae as well    11/28 urine and blood cultures are negative.  Cefepime completed on 12/06 & Giorgio nebs on 12/07/21  Failed TOV & teixeira was replaced for retention of 1000 ml of urine on 12/06/21 PM  Spiked a fever to 102F ? in response to retention  UA with 3 WBCs, no nit or LE, large blood   CXR with clear lungs  CTH revealed a Pseudomeningocele, s/p tap with gram stain without organism  CSF finalized as no growth  Blood & urine cx also finalized as no growth  Completed treatment for C diff w 2 wks of oral vanco on 12/10/21  Diarrhea resolved  He developed recurrent fever and relapsed with C diff  Vanco restarted on 12/22  He has a rectal tube in place.  At high risk for aspiration and  infections given history of retention and poor mental status.  His fecal output has decreased  He still requires ISC  CXR 12/31 with small left PTX  pyuria acceptable with ISC, he now has a teixeira  CXR read as possible KATHERINE  infiltrate  Low grade fever yesterday  but is otherwise clinically stable  E coli in urine can be viewed as coonizer  Suggest:  1. Vanco x 2 more days  than slow taper , 125 tid x 1 week, 125 BID x 1 week, 125 daily x 1 week, and 125 every other day x 2 weeks  2.I would hold off on treatment of E Coli, it likely is a bladder colonizer  2.I think we should monitor low grade temp conservatively.He is chronically ill and is on second course of C Diff therapy.If fever escalates will advise cultures of blood, urine, and sputum and consider additional antibiotics.  3.Fluctuating infiltrates on CXR do not correlate with clinical signs of pneumonia, would simply observe  3.additional ID w/u as necessary

## 2022-01-03 PROCEDURE — 99232 SBSQ HOSP IP/OBS MODERATE 35: CPT

## 2022-01-03 PROCEDURE — 99497 ADVNCD CARE PLAN 30 MIN: CPT | Mod: 25

## 2022-01-03 PROCEDURE — 71250 CT THORAX DX C-: CPT | Mod: 26

## 2022-01-03 PROCEDURE — 99233 SBSQ HOSP IP/OBS HIGH 50: CPT

## 2022-01-03 PROCEDURE — 71045 X-RAY EXAM CHEST 1 VIEW: CPT | Mod: 26,59

## 2022-01-03 RX ORDER — HUMAN INSULIN 100 [IU]/ML
12 INJECTION, SUSPENSION SUBCUTANEOUS EVERY 6 HOURS
Refills: 0 | Status: DISCONTINUED | OUTPATIENT
Start: 2022-01-03 | End: 2022-01-26

## 2022-01-03 RX ADMIN — Medication 1 TABLET(S): at 12:22

## 2022-01-03 RX ADMIN — Medication 12.5 MILLIGRAM(S): at 16:22

## 2022-01-03 RX ADMIN — HUMAN INSULIN 13 UNIT(S): 100 INJECTION, SUSPENSION SUBCUTANEOUS at 00:03

## 2022-01-03 RX ADMIN — Medication 2: at 00:03

## 2022-01-03 RX ADMIN — Medication 3 MILLILITER(S): at 00:01

## 2022-01-03 RX ADMIN — Medication 4 MILLILITER(S): at 12:18

## 2022-01-03 RX ADMIN — Medication 1 DROP(S): at 12:21

## 2022-01-03 RX ADMIN — Medication 2: at 16:24

## 2022-01-03 RX ADMIN — SODIUM CHLORIDE 3 MILLILITER(S): 9 INJECTION INTRAMUSCULAR; INTRAVENOUS; SUBCUTANEOUS at 00:02

## 2022-01-03 RX ADMIN — Medication 4 MILLILITER(S): at 16:21

## 2022-01-03 RX ADMIN — Medication 3 MILLILITER(S): at 12:19

## 2022-01-03 RX ADMIN — APIXABAN 5 MILLIGRAM(S): 2.5 TABLET, FILM COATED ORAL at 16:23

## 2022-01-03 RX ADMIN — Medication 81 MILLIGRAM(S): at 12:22

## 2022-01-03 RX ADMIN — Medication 3 MILLILITER(S): at 16:20

## 2022-01-03 RX ADMIN — Medication 4 MILLILITER(S): at 06:02

## 2022-01-03 RX ADMIN — HUMAN INSULIN 13 UNIT(S): 100 INJECTION, SUSPENSION SUBCUTANEOUS at 06:03

## 2022-01-03 RX ADMIN — Medication 3 MILLILITER(S): at 06:01

## 2022-01-03 RX ADMIN — Medication 1 DROP(S): at 06:05

## 2022-01-03 RX ADMIN — CHLORHEXIDINE GLUCONATE 15 MILLILITER(S): 213 SOLUTION TOPICAL at 16:20

## 2022-01-03 RX ADMIN — Medication 125 MILLIGRAM(S): at 16:22

## 2022-01-03 RX ADMIN — Medication 12.5 MILLIGRAM(S): at 06:02

## 2022-01-03 RX ADMIN — Medication 2 MILLIGRAM(S): at 06:02

## 2022-01-03 RX ADMIN — CHLORHEXIDINE GLUCONATE 15 MILLILITER(S): 213 SOLUTION TOPICAL at 06:03

## 2022-01-03 RX ADMIN — Medication 1 DROP(S): at 16:22

## 2022-01-03 RX ADMIN — Medication 125 MILLIGRAM(S): at 00:03

## 2022-01-03 RX ADMIN — SODIUM CHLORIDE 3 MILLILITER(S): 9 INJECTION INTRAMUSCULAR; INTRAVENOUS; SUBCUTANEOUS at 16:21

## 2022-01-03 RX ADMIN — Medication 4 MILLILITER(S): at 00:02

## 2022-01-03 RX ADMIN — Medication 125 MILLIGRAM(S): at 06:01

## 2022-01-03 RX ADMIN — SODIUM CHLORIDE 3 MILLILITER(S): 9 INJECTION INTRAMUSCULAR; INTRAVENOUS; SUBCUTANEOUS at 12:19

## 2022-01-03 RX ADMIN — APIXABAN 5 MILLIGRAM(S): 2.5 TABLET, FILM COATED ORAL at 06:02

## 2022-01-03 RX ADMIN — SODIUM CHLORIDE 3 MILLILITER(S): 9 INJECTION INTRAMUSCULAR; INTRAVENOUS; SUBCUTANEOUS at 06:02

## 2022-01-03 RX ADMIN — Medication 125 MILLIGRAM(S): at 12:21

## 2022-01-03 RX ADMIN — HUMAN INSULIN 12 UNIT(S): 100 INJECTION, SUSPENSION SUBCUTANEOUS at 16:23

## 2022-01-03 RX ADMIN — PANTOPRAZOLE SODIUM 40 MILLIGRAM(S): 20 TABLET, DELAYED RELEASE ORAL at 12:21

## 2022-01-03 RX ADMIN — Medication 1 DROP(S): at 00:05

## 2022-01-03 RX ADMIN — HUMAN INSULIN 13 UNIT(S): 100 INJECTION, SUSPENSION SUBCUTANEOUS at 12:18

## 2022-01-03 NOTE — PROGRESS NOTE ADULT - PROBLEM SELECTOR PLAN 2
completed course of PO vanco previously.  developed recurrent fever and diarrhea. found to have recurrent c. diff infection.  - consider d/c rectal tube soon if the output is decreasing  - c/w PO vanco started on 12/22 with plan for 14 days of therapy and month long taper  - continue PO vanco day 13 of 14 qid then slow taper, 125 tid x 1 week, 125 BID x 1 week, 125 daily x 1 week, and 125 every other day x 2 weeks  - would avoid abx therapy unless absolutely necessary as will exacerbate c. diff

## 2022-01-03 NOTE — PROGRESS NOTE ADULT - SUBJECTIVE AND OBJECTIVE BOX
CC: f/u for C Diff and fever    Patient reports: he appears comfortable, is non verbal    REVIEW OF SYSTEMS:  All other review of systems negative (Comprehensive ROS)    Antimicrobials Day #  :day 13/14  vancomycin    Solution 125 milliGRAM(s) Oral every 6 hours    Other Medications Reviewed    Vital Signs Last 24 Hrs  T(C): 37 (03 Jan 2022 08:06), Max: 37 (03 Jan 2022 08:06)  T(F): 98.6 (03 Jan 2022 08:06), Max: 98.6 (03 Jan 2022 08:06)  HR: 102 (03 Jan 2022 08:06) (95 - 102)  BP: 120/64 (03 Jan 2022 08:06) (120/64 - 124/77)  BP(mean): --  RR: 20 (03 Jan 2022 08:06) (18 - 20)  SpO2: 100% (03 Jan 2022 08:06) (98% - 100%)    PHYSICAL EXAM:  General: alert, no acute distress  Eyes:  anicteric, no conjunctival injection, no discharge  Oropharynx: no lesions or injection 	  Neck: supple, trach collar  Lungs: clear to auscultation  Heart: regular rate and rhythm; no murmur, rubs or gallops  Abdomen: soft, nondistended, nontender, without mass or organomegaly, peg  Skin: no lesions  Extremities: no clubbing, cyanosis, or edema  Neurologic: poorly interactive  : teixeira  rectal tube    LAB RESULTS:                                   8.0    6.67  )-----------( 359      ( 02 Jan 2022 06:47 )             25.9           MICROBIOLOGY:  RECENT CULTURES:  12-30 @ 18:25 Catheterized Catheterized     >100,000 CFU/ml Escherichia coli          RADIOLOGY REVIEWED:  < from: Xray Chest 1 View- PORTABLE-Urgent (Xray Chest 1 View- PORTABLE-Urgent .) (01.01.22 @ 06:00) >  IMPRESSION: Small left pneumothorax, unchanged. Stable mild haziness in   the left upper lobe which may represent pneumonia.    --- End of Report     < end of copied text >

## 2022-01-03 NOTE — PROGRESS NOTE ADULT - ASSESSMENT
61M with PMH of HTN, CAD s/p stent on DAPT, T2DM who complained of headache and subsequently became unresponsive found to have posterior fossa hemorrhage, IVH, hydrocephalus, s/p EVD and SOC on 11/4. 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 Course c/b respiratory failure and dysphagia s/p trach/PEG (11/19), VPS (11/23),Certas @4 completed course of cefepime for enterobacter and pseudomonas pneumonia on 11/21, fevers, c. diff colitis, urinary retention, b/l distal DVT. Trach changed 12/15 to #7 cuffless due to dislodgement. 12/16 CT A/P wo concern for infectious process. but concern for bladder lesion, wife will pursue possible treatment after rehab. Now with another bout of cdiff found after fever workup, on PO vanco per ID, blood cultures negative to date. +Rectal tube. 12/31 Rectal tube replaced for leaking, Trach became dislodged and replaced by ENT. CXR shows small left pneumothorax. Repeat CXR shows stable small left pneumothorax (no intervention)    PLAN:  Neuro:   - shunt  Certas @4  - PICA aneurysm resection  - pt is DNR  - Palliative consult pending    Respiratory:   - will get CT chest non con to r/o PNA  - trach replaced by ENT for dislodgement  - CXR shows small left pneumothorax, repeat CXR is stable small left pneumothorax, evaluated by Gen Surg- no intervention at this time  - continue duoneb and mucomyst  - trach care, suction prn  - PE/DVT- on apixaban  CV:  - CAD - hx of stent- on ASA 81 mg   - acute post op blood loss anemia improved after transfusion PRBC 12/22  - HTN- continue metoprolol  - on doxazosin for urinary retention  Endocrine:   - DMT2- continue fingersticks with NPH 13u q6  Heme:  - H/H is 8.0/25.9- stable anemia  DVT ppx:   - on eliquis for dvt  Renal:   - hypernatremia- Na 136, continue free water 200q6  - MICHELLE has resolved  -Urinary retention- requiring straight cath q6  - Urology following for renal lesion, PSA/Cytology pending, GOC necessary for any urologic intervention  - f/u urine cytology is negative for high grade urothelial carcinoma  ID:   - ID following: if high temp- send cultures, monitor fever,   - recurrent fever and diarrhea- cdiff again positive, PO vanco restarted (completed course earlier in stay)- ID recs appreciated, continue vanc 2 more days then slow taper- 125 mg tid x 1 week then 125 mg bid x 1 week then 125 mg daily x 1 week  - MRI as above  - follow up CT scan of C/A/P to look for occult source of infection, found to have 2.4 cm nodular soft tissue density, concerning for bladder lesion, 1.7 cm left renal lesion  - CTX for pulmonary coverage if he deteriorates per ID  - c/w PO vanco started 12/22 with plan for 14 days of therapy then month long taper  - if spikes again, may need LP or 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  GI:    - tolerating tube feeds via PEG  - rectal tube in place  PT/OT:   - CHUYITA    Per Hospitalist note, it may be feasible at this time to monitor patient's progress for meaningful recovery over weeks, months and then decide whether there is benefit of pursuing workup of bladder lesion. Spouse in agreement.    Spectralink # [id number]

## 2022-01-03 NOTE — PROGRESS NOTE ADULT - PROBLEM SELECTOR PLAN 5
- trach dislodged a couple of times requiring adjustment by ENT  - continue trach care and suctioning  - monitor O2 sats  - continue nebs. change to xopenex if tachycardia persists but overall improved  - small pneumothorax appears to be stable- appreciate thoracic surgery input

## 2022-01-03 NOTE — PROGRESS NOTE ADULT - PROBLEM SELECTOR PLAN 11
CT a/p with a 2.4 cm nodular soft tissue density in the bladder appears separate from the prostate gland, concerning for bladder lesion  - as per urology - findings concerning for bladder tumor vs prostate gland. cysto transurethral resection would require general anesthesia. recommended checking urine cytology to determine next steps which returned negative. will still need outpatient urology follow-up regardless once acute issues improve  - PSA normal, urine cytology negative for malignancy  - previously spoke to the patient's wife, Sandrita Bauer [phone]. Given the patient's underlying comorbid conditions coupled with his recent devastating neurologic events, other medical complications that ensued, and his poor neurologic and functional status at this time, it may be prudent to monitor his progress and see if he makes any meaningful recovery over the coming weeks to months to decide whether the benefit of pursuing any invasive work up for the bladder lesion outweighs the risk. Spouse seems to be in agreement.  - Will require eventual outpatient f/u with Urology

## 2022-01-03 NOTE — PROGRESS NOTE ADULT - SUBJECTIVE AND OBJECTIVE BOX
[facility] Division of Hospital Medicine  Ros Mcgregor MD  spectra [phone]    Patient is a 61y old  Male who presents with a chief complaint of IVH/SAH (03 Jan 2022 13:20)      SUBJECTIVE / OVERNIGHT EVENTS: unable to obtain ROS from the patient due to mental status.   ADDITIONAL REVIEW OF SYSTEMS:    MEDICATIONS  (STANDING):  acetylcysteine 20%  Inhalation 4 milliLiter(s) Inhalation every 6 hours  albuterol/ipratropium for Nebulization 3 milliLiter(s) Nebulizer every 6 hours  apixaban 5 milliGRAM(s) Enteral Tube every 12 hours  artificial  tears Solution 1 Drop(s) Both EYES every 6 hours  aspirin  chewable 81 milliGRAM(s) Oral daily  atorvastatin 40 milliGRAM(s) Oral at bedtime  chlorhexidine 0.12% Liquid 15 milliLiter(s) Oral Mucosa two times a day  dextrose 50% Injectable 25 Gram(s) IV Push once  dextrose 50% Injectable 12.5 Gram(s) IV Push once  doxazosin 2 milliGRAM(s) Oral daily  insulin lispro (ADMELOG) corrective regimen sliding scale   SubCutaneous every 6 hours  insulin NPH human recombinant 12 Unit(s) SubCutaneous every 6 hours  metoprolol tartrate 12.5 milliGRAM(s) Oral two times a day  multivitamin 1 Tablet(s) Oral daily  pantoprazole  Injectable 40 milliGRAM(s) IV Push daily  sodium chloride 3%  Inhalation 3 milliLiter(s) Inhalation every 6 hours  vancomycin    Solution 125 milliGRAM(s) Oral every 6 hours    MEDICATIONS  (PRN):  acetaminophen    Suspension .. 650 milliGRAM(s) Enteral Tube every 6 hours PRN Temp greater or equal to 38C (100.4F), Mild Pain (1 - 3)      CAPILLARY BLOOD GLUCOSE      POCT Blood Glucose.: 144 mg/dL (03 Jan 2022 12:01)  POCT Blood Glucose.: 144 mg/dL (03 Jan 2022 05:53)  POCT Blood Glucose.: 164 mg/dL (02 Jan 2022 23:55)  POCT Blood Glucose.: 135 mg/dL (02 Jan 2022 17:41)    I&O's Summary    02 Jan 2022 07:01  -  03 Jan 2022 07:00  --------------------------------------------------------  IN: 400 mL / OUT: 800 mL / NET: -400 mL        PHYSICAL EXAM:  Vital Signs Last 24 Hrs  T(C): 37 (03 Jan 2022 08:06), Max: 37 (03 Jan 2022 08:06)  T(F): 98.6 (03 Jan 2022 08:06), Max: 98.6 (03 Jan 2022 08:06)  HR: 102 (03 Jan 2022 08:06) (95 - 102)  BP: 120/64 (03 Jan 2022 08:06) (120/64 - 124/77)  BP(mean): --  RR: 20 (03 Jan 2022 08:06) (18 - 20)  SpO2: 100% (03 Jan 2022 08:06) (98% - 100%)    CONSTITUTIONAL: NAD, well-developed, well-groomed  NECK: + trach  RESPIRATORY: Normal respiratory effort; lungs are clear to auscultation bilaterally  CARDIOVASCULAR: normal S1 and S2, no murmur/rub/gallop; No lower extremity edema  ABDOMEN: Nontender to palpation, normoactive bowel sounds, no rebound/guarding; + PEG  MUSCULOSKELETAL:  no clubbing or cyanosis of digits; no joint swelling or tenderness to palpation  PSYCH: eyes closed  NEURO: doesn't follow commands  SKIN: No rashes; warm to touch    LABS:                        8.0    6.67  )-----------( 359      ( 02 Jan 2022 06:47 )             25.9     01-02    136  |  96  |  22  ----------------------------<  105<H>  4.5   |  28  |  0.54    Ca    8.7      02 Jan 2022 06:46                  RADIOLOGY & ADDITIONAL TESTS:  Results Reviewed:     < from: Xray Chest 1 View- PORTABLE-Routine (Xray Chest 1 View- PORTABLE-Routine in AM.) (01.02.22 @ 09:14) >  IMPRESSION:    Unchanged left apical pneumothorax when compared with 1/1/2022.  Left lower lobe hazy opacity is more conspicuous when compared with   12/31/2021.    < end of copied text >    Imaging Personally Reviewed:  Electrocardiogram Personally Reviewed:    COORDINATION OF CARE:  Care Discussed with Consultants/Other Providers [Y/N]: neurosurgery PA(Ciara)  Prior or Outpatient Records Reviewed [Y/N]:

## 2022-01-03 NOTE — PROGRESS NOTE ADULT - CONVERSATION/DISCUSSION
Prognosis/MOLST Discussed
Diagnosis/Prognosis/MOLST Discussed/Child Life Referral ([facility])/Palliative Care Referral
Diagnosis/Prognosis/Treatment Options

## 2022-01-03 NOTE — PROGRESS NOTE ADULT - ASSESSMENT
60 yo male with PMH of HTN   Admitted on 11/4 with severe headache and found to have cerebellar bleed.  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  S/p trach and peg.   He has distal DVT's - on apixaban.  He received cefepime 11/4-11/21 for respiratory coverage.  Developed watery C diff diarrhea on 11/26 - started on oral vancomycin   Then restarted on cefepime on 11/28 for concerns of pneumonia since had low grade temps. Giorgio neb added later  But CXR's remained clear.  Ct of brain showed residual blood.  Pseudomonas in sputum found to be resistant to carbapenems & quinolones. Sputum isolated strep pneumoniae as well    11/28 urine and blood cultures are negative.  Cefepime completed on 12/06 & Giorgio nebs on 12/07/21  Failed TOV & teixeira was replaced for retention of 1000 ml of urine on 12/06/21 PM  Spiked a fever to 102F ? in response to retention  UA with 3 WBCs, no nit or LE, large blood   CXR with clear lungs  CTH revealed a Pseudomeningocele, s/p tap with gram stain without organism  CSF finalized as no growth  Blood & urine cx also finalized as no growth  Completed treatment for C diff w 2 wks of oral vanco on 12/10/21  Diarrhea resolved  He developed recurrent fever and relapsed with C diff  Vanco restarted on 12/22  He has a rectal tube in place.  At high risk for aspiration and  infections given history of retention and poor mental status.  His fecal output has decreased  He still requires ISC  CXR 12/31 with small left PTX  pyuria acceptable with ISC, he now has a teixeira  CXR read as possible KATHERINE  infiltrate  Low grade fever yesterday  but is otherwise clinically stable  E coli in urine can be viewed as coonizer  vitals reviewed the patient is afebrile     Plan   continue  Vanco day 13 of 14  qid then slow taper , 125 tid x 1 week, 125 BID x 1 week, 125 daily x 1 week, and 125 every other day x 2 weeks  continue supportive care as per primary team

## 2022-01-03 NOTE — PROGRESS NOTE ADULT - ASSESSMENT
61M with PMH of HTN, CAD s/p stent on DAPT, T2DM who complained of headache and subsequently became unresponsive found to have posterior fossa hemorrhage, IVH, hydrocephalus, s/p EVD and SOC on 11/4. 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 Course c/b respiratory failure and dysphagia s/p trach/PEG (11/19), VPS (11/23), completed course of cefepime for enterobacter and pseudomonas pneumonia on 11/21, fevers, c. diff colitis, urinary retention, b/l distal DVT. New fevers again on 12/22 with diarrhea found to have recurrent c. diff infection on PO vanco. Trach dislodged again on 12/31, replaced by ENT, c/b small pneumothorax, recurrent fever.

## 2022-01-03 NOTE — PROGRESS NOTE ADULT - ASSESSMENT
62 y/o F hx of HTN, DM, admitted 11/4 with HA/N/V followed by unresponsiveness, CT head with posterior fossa hemorrhage c/b severe IVH and hydrocephalus, s/p EVD, suboccipital craniectomy and shunt. s/p trach and peg. Hospital course prolonged and complicated by infections and small pneumothorax. Palliative reconsulted for goals of care.

## 2022-01-03 NOTE — PROGRESS NOTE ADULT - SUBJECTIVE AND OBJECTIVE BOX
SUBJECTIVE: Pt seen and examined, resting in bed, appears comfortable. CXR done this morning    OVERNIGHT EVENTS:     Vital Signs Last 24 Hrs  T(C): 37 (03 Jan 2022 08:06), Max: 37 (03 Jan 2022 08:06)  T(F): 98.6 (03 Jan 2022 08:06), Max: 98.6 (03 Jan 2022 08:06)  HR: 102 (03 Jan 2022 08:06) (95 - 102)  BP: 120/64 (03 Jan 2022 08:06) (120/64 - 124/77)  BP(mean): --  RR: 20 (03 Jan 2022 08:06) (18 - 20)  SpO2: 100% (03 Jan 2022 08:06) (98% - 100%)    PHYSICAL EXAM:    General: No Acute Distress     Neurological: Awake, alert oriented to person, place and time, Following Commands, PERRL, EOMI, Face Symmetrical, Speech Fluent, Moving all extremities, Muscle Strength normal in all four extremities, No Drift, Sensation to Light Touch Intact    Pulmonary: Clear to Auscultation, No Rales, No Rhonchi, No Wheezes     Cardiovascular: S1, S2, Regular Rate and Rhythm     Gastrointestinal: Soft, Nontender, Nondistended     Incision:     LABS:                        8.0    6.67  )-----------( 359      ( 02 Jan 2022 06:47 )             25.9    01-02    136  |  96  |  22  ----------------------------<  105<H>  4.5   |  28  |  0.54    Ca    8.7      02 Jan 2022 06:46          01-02 @ 07:01  -  01-03 @ 07:00  --------------------------------------------------------  IN: 400 mL / OUT: 800 mL / NET: -400 mL      DRAINS:     MEDICATIONS:  Antibiotics:  vancomycin    Solution 125 milliGRAM(s) Oral every 6 hours    Neuro:  acetaminophen    Suspension .. 650 milliGRAM(s) Enteral Tube every 6 hours PRN Temp greater or equal to 38C (100.4F), Mild Pain (1 - 3)    Cardiac:  doxazosin 2 milliGRAM(s) Oral daily  metoprolol tartrate 12.5 milliGRAM(s) Oral two times a day    Pulm:  acetylcysteine 20%  Inhalation 4 milliLiter(s) Inhalation every 6 hours  albuterol/ipratropium for Nebulization 3 milliLiter(s) Nebulizer every 6 hours  sodium chloride 3%  Inhalation 3 milliLiter(s) Inhalation every 6 hours    GI/:  pantoprazole  Injectable 40 milliGRAM(s) IV Push daily    Other:   apixaban 5 milliGRAM(s) Enteral Tube every 12 hours  artificial  tears Solution 1 Drop(s) Both EYES every 6 hours  aspirin  chewable 81 milliGRAM(s) Oral daily  atorvastatin 40 milliGRAM(s) Oral at bedtime  chlorhexidine 0.12% Liquid 15 milliLiter(s) Oral Mucosa two times a day  dextrose 50% Injectable 25 Gram(s) IV Push once  dextrose 50% Injectable 12.5 Gram(s) IV Push once  insulin lispro (ADMELOG) corrective regimen sliding scale   SubCutaneous every 6 hours  insulin NPH human recombinant 13 Unit(s) SubCutaneous every 6 hours  multivitamin 1 Tablet(s) Oral daily    DIET: [] Regular [] CCD [] Renal [] Puree [] Dysphagia [] Tube Feeds:     IMAGING:    SUBJECTIVE: Pt seen and examined, resting in bed, appears comfortable. CXR done this morning- follow up official read, ?PNA    OVERNIGHT EVENTS:     Vital Signs Last 24 Hrs  T(C): 37 (03 Jan 2022 08:06), Max: 37 (03 Jan 2022 08:06)  T(F): 98.6 (03 Jan 2022 08:06), Max: 98.6 (03 Jan 2022 08:06)  HR: 102 (03 Jan 2022 08:06) (95 - 102)  BP: 120/64 (03 Jan 2022 08:06) (120/64 - 124/77)  BP(mean): --  RR: 20 (03 Jan 2022 08:06) (18 - 20)  SpO2: 100% (03 Jan 2022 08:06) (98% - 100%)    PHYSICAL EXAM:    General: No Acute Distress     Neurological: Awake, eyes open with downward gaze,  trach collar, intermittently FC (squeezes hand), UE 0/5, b/l LE wds    Pulmonary: Clear to Auscultation, No Rales, No Rhonchi, No Wheezes     Cardiovascular: S1, S2, Regular Rate and Rhythm     Gastrointestinal: Soft, Nontender, Nondistended     Incision: healed post fossa incision    LABS:                        8.0    6.67  )-----------( 359      ( 02 Jan 2022 06:47 )             25.9    01-02    136  |  96  |  22  ----------------------------<  105<H>  4.5   |  28  |  0.54    Ca    8.7      02 Jan 2022 06:46          01-02 @ 07:01  -  01-03 @ 07:00  --------------------------------------------------------  IN: 400 mL / OUT: 800 mL / NET: -400 mL      DRAINS: none    MEDICATIONS:  Antibiotics:  vancomycin    Solution 125 milliGRAM(s) Oral every 6 hours    Neuro:  acetaminophen    Suspension .. 650 milliGRAM(s) Enteral Tube every 6 hours PRN Temp greater or equal to 38C (100.4F), Mild Pain (1 - 3)    Cardiac:  doxazosin 2 milliGRAM(s) Oral daily  metoprolol tartrate 12.5 milliGRAM(s) Oral two times a day    Pulm:  acetylcysteine 20%  Inhalation 4 milliLiter(s) Inhalation every 6 hours  albuterol/ipratropium for Nebulization 3 milliLiter(s) Nebulizer every 6 hours  sodium chloride 3%  Inhalation 3 milliLiter(s) Inhalation every 6 hours    GI/:  pantoprazole  Injectable 40 milliGRAM(s) IV Push daily    Other:   apixaban 5 milliGRAM(s) Enteral Tube every 12 hours  artificial  tears Solution 1 Drop(s) Both EYES every 6 hours  aspirin  chewable 81 milliGRAM(s) Oral daily  atorvastatin 40 milliGRAM(s) Oral at bedtime  chlorhexidine 0.12% Liquid 15 milliLiter(s) Oral Mucosa two times a day  dextrose 50% Injectable 25 Gram(s) IV Push once  dextrose 50% Injectable 12.5 Gram(s) IV Push once  insulin lispro (ADMELOG) corrective regimen sliding scale   SubCutaneous every 6 hours  insulin NPH human recombinant 13 Unit(s) SubCutaneous every 6 hours  multivitamin 1 Tablet(s) Oral daily    DIET: [] Regular [] CCD [] Renal [] Puree [] Dysphagia [x] Tube Feeds:     IMAGING:   < from: Xray Chest 1 View- PORTABLE-Urgent (Xray Chest 1 View- PORTABLE-Urgent .) (12.31.21 @ 07:32) >    INTERPRETATION:  stable small Left pneumothorax.  follow up official report.    < end of copied text >  < from: Xray Chest 1 View- PORTABLE-Urgent (Xray Chest 1 View- PORTABLE-Urgent .) (12.31.21 @ 04:52) >    INTERPRETATION:  new small Left pneumothorax.  discussed with Dr Kelley of neurosurgery.  follow up official report.    < end of copied text >   from: CT Abdomen and Pelvis w/ IV Cont (12.16.21 @ 09:26) >    IMPRESSION:  A 2.4 cm nodular soft tissue density in the bladder appears separate from   the prostate gland, concerning for bladder lesion. Correlate with   cystoscopy.    < from: MR Head w/wo IV Cont (12.23.21 @ 23:50) >    IMPRESSION: Postop changes are again identified with extra axial   collection seen in the postoperative region. This could be compatible   with pseudomeningocele, though the possibility of abscess or dural leak   cannot entirely excluded. Clinical correlation and continued close   interval is recommended.    Small foci of abnormal restricted diffusion is seen involving the right   centrum semiovale and medial right frontal cortex. These findings are   likely compatible with acute lacunar infarcts.    < end of copied text >  A 2.7 cm fluid collection adjacent to the right ischial tuberosity, may   be related to ischiogluteal bursitis.    Indeterminant 1.7 cm left renal lesion. Contrast-enhanced MRI can be   performed further evaluation.    Mild fatty infiltration along the PEG tube catheter without drainable   fluid collection.    < end of copied text >  < from: CT Head No Cont (12.12.21 @ 15:38) >  IMPRESSION:    Similar ventricular size when compared to 12/9/2021.    < end of copied text >  < from: CT Head No Cont (12.07.21 @ 11:42) >  IMPRESSION: Left suboccipital craniectomy and cerebellar postoperative changes. Right parietal  shunt catheter. Moderate enlargement of the ventricles compared with 11/26/2021..    < end of copied text >  < from: MR Head w/wo IV Cont (12.23.21 @ 23:50) >  IMPRESSION: Postop changes are again identified with extra axial   collection seen in the postoperative region. This could be compatible   with pseudomeningocele, though the possibility of abscess or dural leak   cannot entirely excluded. Clinical correlation and continued close   interval is recommended.    Small foci of abnormal restricted diffusion is seen involving the right   centrum semiovale and medial right frontal cortex. These findings are   likely compatible with acute lacunar infarcts.    --- End of Report ---    < end of copied text >  < from: VA Duplex Lower Ext Vein Scan, Bilat (12.08.21 @ 12:21) >    IMPRESSION:    Persistent bilateral below the knees deep venous thrombosis in the   bilateral calf veins. No propagation.    < end of copied text >    < from: Xray Chest 1 View- PORTABLE-Urgent (Xray Chest 1 View- PORTABLE-Urgent .) (01.01.22 @ 06:00) >  IMPRESSION: Small left pneumothorax, unchanged. Stable mild haziness in   the left upper lobe which may represent pneumonia.    < end of copied text >

## 2022-01-03 NOTE — PROGRESS NOTE ADULT - PROBLEM SELECTOR PLAN 1
Per ID- monitor low grade temp conservatively. If fever escalates, check blood/urine/sputum cx  - Ucx 12/30 with + ecoli thought to be a colonizer  - CXR 1/2 with increased left lower lobe opacity  - check CT chest, procalcitonin(ordered)  - monitor off abx for now

## 2022-01-03 NOTE — PROGRESS NOTE ADULT - NSPROGADDITIONALINFOA_GEN_ALL_CORE
Overall prognosis is guarded. Palliative re-consulted for further GOC. Patient is DNR. ? consider hospice.

## 2022-01-03 NOTE — PROGRESS NOTE ADULT - CONVERSATION DETAILS
Spoke with Sandrita via phone, on her way to the hospital.  Agrees that DNR is appropriate given patients devastating neurological event.  KALEE completed and sunrise updated accordingly.  I shared MD's update on her , specifically, weaning trial, CPAP trials in addition to patient following commands, wiggling fingers adding that this does not change overall prognosis of dependency and long term care needs. We will continue to support this family
Met face to face with wife Sandrita, daughters Rosy Corcoran and Jerilyn, long standing friend Lj . Additionally , RITA Lord , Neuro fellow Franc.  After appropriate introductions , Dr Bruner discussed patients overall medical status and described a very poor prognosis with no likelihood for a meaningful recovery.  Family describe a very outgoing man, ready to help anyone who needs.  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   We discussed a symptom mediated approach with a compassionate extubation vs continued medical management with trach/peg/nursing home placement.   Wife and daughters appropriately tearful.  Questions and concerns addressed.   We discussed advance directives, specifically DNR/DNI and explained this did not mean do not treat.  Family wish to have some time to discuss these options.  Chaplancy offered , referral established  .  Provided family with my contact information and remain at their disposal for any further support.  Palliative will continue to follow
Patient unable to participate in goals of care discussion due to cognitive impairment. Patient's wife Sandrita Bauer is surrogate. Due to visitation restrictions in setting of COVID pandemic goals of care discussion held via telephone. Mrs. Camacho has good understanding of patient's current condition. We discussed complications during hospitalization which have prevented patient from being discharged to [facility]. When addressing overall goals of care and what patient's wishes would be for his care if he could see himself this way, Mrs. Camacho became appropriately tearful. She stated she wasn't prepared to have this conversation now. Offered to set up family meeting to discuss goals of care further, which she was agreeable to. Mrs. Camacho to reach out to her children regarding availability. All questions answered and emotional support provided.     Patient remains DNR/DNI  Continue all current interventions

## 2022-01-03 NOTE — PROGRESS NOTE ADULT - PROBLEM SELECTOR PLAN 7
Will continue to follow for goals of care. Case discussed with primary team ACP.    For acute issues or uncontrolled symptoms please page.    Brianna Jacobo MD  Geriatric and Palliative Medicine Attending   [facility] Pager [phone]

## 2022-01-03 NOTE — PROGRESS NOTE ADULT - SUBJECTIVE AND OBJECTIVE BOX
SUBJECTIVE AND OBJECTIVE: Pt seen and examined at bedside. Pt opens eyes but minimally responsive.  INTERVAL HPI/OVERNIGHT EVENTS: No acute events reported by RN at bedside.    DNR on chart:   Allergies    penicillin (Other)    Intolerances    MEDICATIONS  (STANDING):  acetylcysteine 20%  Inhalation 4 milliLiter(s) Inhalation every 6 hours  albuterol/ipratropium for Nebulization 3 milliLiter(s) Nebulizer every 6 hours  apixaban 5 milliGRAM(s) Enteral Tube every 12 hours  artificial  tears Solution 1 Drop(s) Both EYES every 6 hours  aspirin  chewable 81 milliGRAM(s) Oral daily  atorvastatin 40 milliGRAM(s) Oral at bedtime  chlorhexidine 0.12% Liquid 15 milliLiter(s) Oral Mucosa two times a day  dextrose 50% Injectable 25 Gram(s) IV Push once  dextrose 50% Injectable 12.5 Gram(s) IV Push once  doxazosin 2 milliGRAM(s) Oral daily  insulin lispro (ADMELOG) corrective regimen sliding scale   SubCutaneous every 6 hours  insulin NPH human recombinant 12 Unit(s) SubCutaneous every 6 hours  metoprolol tartrate 12.5 milliGRAM(s) Oral two times a day  multivitamin 1 Tablet(s) Oral daily  pantoprazole  Injectable 40 milliGRAM(s) IV Push daily  sodium chloride 3%  Inhalation 3 milliLiter(s) Inhalation every 6 hours  vancomycin    Solution 125 milliGRAM(s) Oral every 6 hours    MEDICATIONS  (PRN):  acetaminophen    Suspension .. 650 milliGRAM(s) Enteral Tube every 6 hours PRN Temp greater or equal to 38C (100.4F), Mild Pain (1 - 3)      ITEMS UNCHECKED ARE NOT PRESENT    PRESENT SYMPTOMS: [x]Unable to obtain due to poor mentation   Source if other than patient:  [ ]Family   [x]Team     Pain:  [ ]yes [x]no see PAINAD  QOL impact -   Location -                    Aggravating factors -  Quality -  Radiation -  Timing-  Severity (0-10 scale):  Minimal acceptable level (0-10 scale):     Dyspnea:                           [ ]Mild [ ]Moderate [ ]Severe  Anxiety:                             [ ]Mild [ ]Moderate [ ]Severe  Fatigue:                             [ ]Mild [ ]Moderate [ ]Severe  Nausea:                             [ ]Mild [ ]Moderate [ ]Severe  Loss of appetite:              [ ]Mild [ ]Moderate [ ]Severe  Constipation:                    [ ]Mild [ ]Moderate [ ]Severe    CPOT:    https://www.sccm.org/getattachment/zcx74x08-6h9f-9b0y-2q4g-5540c7418z1x/Critical-Care-Pain-Observation-Tool-(CPOT)    PAIN AD Score:	0  http://geriatrictoolkit.[facility]/cog/painad.pdf (Ctrl + left click to view)    Other Symptoms:  [x]All other review of systems- unable to assess due to cognitive impairment    Palliative Performance Status Version 2:   10      %      http://npcrc.org/files/news/palliative_performance_scale_ppsv2.pdf  PHYSICAL EXAM:  Vital Signs Last 24 Hrs  T(C): 37 (03 Jan 2022 08:06), Max: 37 (03 Jan 2022 08:06)  T(F): 98.6 (03 Jan 2022 08:06), Max: 98.6 (03 Jan 2022 08:06)  HR: 102 (03 Jan 2022 08:06) (95 - 102)  BP: 120/64 (03 Jan 2022 08:06) (120/64 - 124/77)  BP(mean): --  RR: 20 (03 Jan 2022 08:06) (18 - 20)  SpO2: 100% (03 Jan 2022 08:06) (98% - 100%) I&O's Summary    02 Jan 2022 07:01  -  03 Jan 2022 07:00  --------------------------------------------------------  IN: 400 mL / OUT: 800 mL / NET: -400 mL       GENERAL:  [x]Alert  [ ]Oriented x   [ ]Lethargic  [ ]Cachexia  [ ]Unarousable  [ ]Verbal  [x]Non-Verbal  Behavioral:   [ ]Anxiety  [ ]Delirium [ ]Agitation [ ]Other  HEENT:  [ ]Normal   [ ]Dry mouth   [x] Trach  [ ]Oral lesions  PULMONARY:   [ ]Clear [ ]Tachypnea  [x]Audible excessive secretions   [ ]Rhonchi        [ ]Right [ ]Left [ ]Bilateral  [ ]Crackles        [ ]Right [ ]Left [ ]Bilateral  [ ]Wheezing     [ ]Right [ ]Left [ ]Bilateral  [ ]Diminished BS [ ] Right [ ]Left [ ]Bilateral  CARDIOVASCULAR:    [x]Regular [ ]Irregular [ ]Tachy  [ ]Bridger [ ]Murmur [ ]Other  GASTROINTESTINAL:  [x]Soft  [ ]Distended   [ ]+BS  [x]Non tender [ ]Tender  [x]PEG [ ]OGT/ NGT   Last BM:    GENITOURINARY:  [ ]Normal [ ]Incontinent   [ ]Oliguria/Anuria   [x]Barney  MUSCULOSKELETAL:   [ ]Normal   [ ]Weakness  [x]Bed/Wheelchair bound [ ]Edema  NEUROLOGIC:   [ ]No focal deficits  [x] Cognitive impairment  [ ] Dysphagia [ ]Dysarthria [ ] Paresis [ ]Other     CRITICAL CARE:  [ ]Shock Present  [ ]Septic [ ]Cardiogenic [ ]Neurologic [ ]Hypovolemic  [ ]Vasopressors [ ]Inotropes  [ ]Respiratory failure present [ ]Mechanical Ventilation [ ]Non-invasive ventilatory support [ ]High-Flow  [ ]Acute  [ ]Chronic [ ]Hypoxic  [ ]Hypercarbic [ ]Other  [ ]Other organ failure     LABS:                        8.0    6.67  )-----------( 359      ( 02 Jan 2022 06:47 )             25.9   01-02    136  |  96  |  22  ----------------------------<  105<H>  4.5   |  28  |  0.54    Ca    8.7      02 Jan 2022 06:46      RADIOLOGY & ADDITIONAL STUDIES:  < from: Xray Chest 1 View- PORTABLE-Routine (Xray Chest 1 View- PORTABLE-Routine in AM.) (01.02.22 @ 09:14) >    ACC: [id number] EXAM:  XR CHEST PORTABLE ROUTINE 1V                          PROCEDURE DATE:  01/02/2022          INTERPRETATION:  CLINICAL INFORMATION: Reevaluate left pneumothorax.    TECHNIQUE: Frontal radiograph of the chest.    COMPARISON: Chestradiograph 1/1/2022.    FINDINGS:    LINES/TUBES: Tracheostomy tube in place. Partially visualized right    shunt catheter.    LUNGS: Redemonstrated left lower lobe hazy opacity, more conspicuous when   compared with 12/31/2021.    PLEURA: Small left apical pneumothorax, unchanged compared with 1/1/2022.    HEART AND MEDIASTINUM: Heart size is within normal limits.    SKELETON: Degenerative changes of the thoracic spine.      IMPRESSION:    Unchanged left apical pneumothorax when compared with 1/1/2022.  Left lower lobe hazy opacity is more conspicuous when compared with   12/31/2021.    --- End of Report ---          NICOLE CORONA MD; Resident Radiology  This document has been electronically signed.  BOB AVALOS MD; Attending Radiologist  This document has been electronically signed. Jan 2 2022 10:52AM    < end of copied text >    Protein Calorie Malnutrition Present: [ ]mild [ ]moderate [ ]severe [ ]underweight [ ]morbid obesity  https://www.andeal.org/vault/2440/web/files/ONC/Table_Clinical%20Characteristics%20to%20Document%20Malnutrition-White%20JV%20et%20al%202012.pdf    Height (cm): 177.8 (11-23-21 @ 15:13)  Weight (kg): 86.8 (11-23-21 @ 15:13)  BMI (kg/m2): 27.5 (11-23-21 @ 15:13)    [ ]PPSV2 < or = 30%  [ ]significant weight loss [ ]poor nutritional intake [ ]anasarca    [ ]Artificial Nutrition    REFERRALS:   [ ]Chaplaincy  [ ]Hospice  [ ]Child Life  [ ]Social Work  [ ]Case management [ ]Holistic Therapy     Goals of Care Document:

## 2022-01-04 LAB
ANION GAP SERPL CALC-SCNC: 13 MMOL/L — SIGNIFICANT CHANGE UP (ref 5–17)
BUN SERPL-MCNC: 22 MG/DL — SIGNIFICANT CHANGE UP (ref 7–23)
CALCIUM SERPL-MCNC: 8.8 MG/DL — SIGNIFICANT CHANGE UP (ref 8.4–10.5)
CHLORIDE SERPL-SCNC: 94 MMOL/L — LOW (ref 96–108)
CO2 SERPL-SCNC: 28 MMOL/L — SIGNIFICANT CHANGE UP (ref 22–31)
CREAT SERPL-MCNC: 0.55 MG/DL — SIGNIFICANT CHANGE UP (ref 0.5–1.3)
GLUCOSE BLDC GLUCOMTR-MCNC: 182 MG/DL — HIGH (ref 70–99)
GLUCOSE SERPL-MCNC: 167 MG/DL — HIGH (ref 70–99)
GRAM STN FLD: SIGNIFICANT CHANGE UP
HCT VFR BLD CALC: 28.3 % — LOW (ref 39–50)
HGB BLD-MCNC: 8.8 G/DL — LOW (ref 13–17)
MCHC RBC-ENTMCNC: 29 PG — SIGNIFICANT CHANGE UP (ref 27–34)
MCHC RBC-ENTMCNC: 31.1 GM/DL — LOW (ref 32–36)
MCV RBC AUTO: 93.4 FL — SIGNIFICANT CHANGE UP (ref 80–100)
NRBC # BLD: 0 /100 WBCS — SIGNIFICANT CHANGE UP (ref 0–0)
PLATELET # BLD AUTO: 424 K/UL — HIGH (ref 150–400)
POTASSIUM SERPL-MCNC: 4.9 MMOL/L — SIGNIFICANT CHANGE UP (ref 3.5–5.3)
POTASSIUM SERPL-SCNC: 4.9 MMOL/L — SIGNIFICANT CHANGE UP (ref 3.5–5.3)
PROCALCITONIN SERPL-MCNC: 0.15 NG/ML — HIGH (ref 0.02–0.1)
RBC # BLD: 3.03 M/UL — LOW (ref 4.2–5.8)
RBC # FLD: 13.1 % — SIGNIFICANT CHANGE UP (ref 10.3–14.5)
SARS-COV-2 RNA SPEC QL NAA+PROBE: SIGNIFICANT CHANGE UP
SODIUM SERPL-SCNC: 135 MMOL/L — SIGNIFICANT CHANGE UP (ref 135–145)
SPECIMEN SOURCE: SIGNIFICANT CHANGE UP
WBC # BLD: 11.52 K/UL — HIGH (ref 3.8–10.5)
WBC # FLD AUTO: 11.52 K/UL — HIGH (ref 3.8–10.5)

## 2022-01-04 PROCEDURE — 99233 SBSQ HOSP IP/OBS HIGH 50: CPT

## 2022-01-04 PROCEDURE — 71045 X-RAY EXAM CHEST 1 VIEW: CPT | Mod: 26

## 2022-01-04 RX ORDER — CEFEPIME 1 G/1
2000 INJECTION, POWDER, FOR SOLUTION INTRAMUSCULAR; INTRAVENOUS EVERY 12 HOURS
Refills: 0 | Status: COMPLETED | OUTPATIENT
Start: 2022-01-05 | End: 2022-01-08

## 2022-01-04 RX ORDER — CEFEPIME 1 G/1
INJECTION, POWDER, FOR SOLUTION INTRAMUSCULAR; INTRAVENOUS
Refills: 0 | Status: COMPLETED | OUTPATIENT
Start: 2022-01-04 | End: 2022-01-08

## 2022-01-04 RX ORDER — METRONIDAZOLE 500 MG
500 TABLET ORAL EVERY 8 HOURS
Refills: 0 | Status: DISCONTINUED | OUTPATIENT
Start: 2022-01-04 | End: 2022-01-06

## 2022-01-04 RX ORDER — METRONIDAZOLE 500 MG
500 TABLET ORAL ONCE
Refills: 0 | Status: COMPLETED | OUTPATIENT
Start: 2022-01-04 | End: 2022-01-04

## 2022-01-04 RX ORDER — CEFEPIME 1 G/1
2000 INJECTION, POWDER, FOR SOLUTION INTRAMUSCULAR; INTRAVENOUS ONCE
Refills: 0 | Status: COMPLETED | OUTPATIENT
Start: 2022-01-04 | End: 2022-01-04

## 2022-01-04 RX ORDER — METRONIDAZOLE 500 MG
TABLET ORAL
Refills: 0 | Status: DISCONTINUED | OUTPATIENT
Start: 2022-01-04 | End: 2022-01-06

## 2022-01-04 RX ADMIN — Medication 3 MILLILITER(S): at 12:50

## 2022-01-04 RX ADMIN — Medication 100 MILLIGRAM(S): at 17:08

## 2022-01-04 RX ADMIN — HUMAN INSULIN 12 UNIT(S): 100 INJECTION, SUSPENSION SUBCUTANEOUS at 06:26

## 2022-01-04 RX ADMIN — CEFEPIME 100 MILLIGRAM(S): 1 INJECTION, POWDER, FOR SOLUTION INTRAMUSCULAR; INTRAVENOUS at 17:51

## 2022-01-04 RX ADMIN — Medication 125 MILLIGRAM(S): at 06:27

## 2022-01-04 RX ADMIN — PANTOPRAZOLE SODIUM 40 MILLIGRAM(S): 20 TABLET, DELAYED RELEASE ORAL at 12:48

## 2022-01-04 RX ADMIN — Medication 2: at 06:26

## 2022-01-04 RX ADMIN — SODIUM CHLORIDE 3 MILLILITER(S): 9 INJECTION INTRAMUSCULAR; INTRAVENOUS; SUBCUTANEOUS at 00:21

## 2022-01-04 RX ADMIN — Medication 1 TABLET(S): at 12:49

## 2022-01-04 RX ADMIN — Medication 1 DROP(S): at 17:11

## 2022-01-04 RX ADMIN — Medication 4 MILLILITER(S): at 12:49

## 2022-01-04 RX ADMIN — Medication 100 MILLIGRAM(S): at 21:01

## 2022-01-04 RX ADMIN — HUMAN INSULIN 12 UNIT(S): 100 INJECTION, SUSPENSION SUBCUTANEOUS at 00:22

## 2022-01-04 RX ADMIN — APIXABAN 5 MILLIGRAM(S): 2.5 TABLET, FILM COATED ORAL at 17:13

## 2022-01-04 RX ADMIN — ATORVASTATIN CALCIUM 40 MILLIGRAM(S): 80 TABLET, FILM COATED ORAL at 21:01

## 2022-01-04 RX ADMIN — Medication 125 MILLIGRAM(S): at 12:48

## 2022-01-04 RX ADMIN — Medication 1 DROP(S): at 00:22

## 2022-01-04 RX ADMIN — SODIUM CHLORIDE 3 MILLILITER(S): 9 INJECTION INTRAMUSCULAR; INTRAVENOUS; SUBCUTANEOUS at 06:26

## 2022-01-04 RX ADMIN — Medication 4 MILLILITER(S): at 00:20

## 2022-01-04 RX ADMIN — Medication 3 MILLILITER(S): at 06:27

## 2022-01-04 RX ADMIN — Medication 125 MILLIGRAM(S): at 00:22

## 2022-01-04 RX ADMIN — SODIUM CHLORIDE 3 MILLILITER(S): 9 INJECTION INTRAMUSCULAR; INTRAVENOUS; SUBCUTANEOUS at 17:12

## 2022-01-04 RX ADMIN — Medication 4 MILLILITER(S): at 17:11

## 2022-01-04 RX ADMIN — HUMAN INSULIN 12 UNIT(S): 100 INJECTION, SUSPENSION SUBCUTANEOUS at 12:49

## 2022-01-04 RX ADMIN — Medication 3 MILLILITER(S): at 00:20

## 2022-01-04 RX ADMIN — Medication 4 MILLILITER(S): at 06:25

## 2022-01-04 RX ADMIN — Medication 2: at 12:50

## 2022-01-04 RX ADMIN — CHLORHEXIDINE GLUCONATE 15 MILLILITER(S): 213 SOLUTION TOPICAL at 06:27

## 2022-01-04 RX ADMIN — APIXABAN 5 MILLIGRAM(S): 2.5 TABLET, FILM COATED ORAL at 06:27

## 2022-01-04 RX ADMIN — HUMAN INSULIN 12 UNIT(S): 100 INJECTION, SUSPENSION SUBCUTANEOUS at 17:09

## 2022-01-04 RX ADMIN — Medication 1 DROP(S): at 12:51

## 2022-01-04 RX ADMIN — Medication 2 MILLIGRAM(S): at 06:27

## 2022-01-04 RX ADMIN — ATORVASTATIN CALCIUM 40 MILLIGRAM(S): 80 TABLET, FILM COATED ORAL at 00:21

## 2022-01-04 RX ADMIN — CHLORHEXIDINE GLUCONATE 15 MILLILITER(S): 213 SOLUTION TOPICAL at 17:11

## 2022-01-04 RX ADMIN — Medication 1 DROP(S): at 06:25

## 2022-01-04 RX ADMIN — Medication 3 MILLILITER(S): at 17:11

## 2022-01-04 RX ADMIN — Medication 125 MILLIGRAM(S): at 17:09

## 2022-01-04 RX ADMIN — Medication 2: at 17:10

## 2022-01-04 RX ADMIN — SODIUM CHLORIDE 3 MILLILITER(S): 9 INJECTION INTRAMUSCULAR; INTRAVENOUS; SUBCUTANEOUS at 12:49

## 2022-01-04 RX ADMIN — Medication 12.5 MILLIGRAM(S): at 17:12

## 2022-01-04 RX ADMIN — Medication 81 MILLIGRAM(S): at 12:49

## 2022-01-04 RX ADMIN — Medication 12.5 MILLIGRAM(S): at 06:27

## 2022-01-04 NOTE — PROGRESS NOTE ADULT - ASSESSMENT
61 year old man with respiratory failure d/t ICH    1. ICH  -per neurosurgery  -s/p  shunt  -MRI done, as per neuro    2. Respiratory failure  -for tracheostomy, will require long term enteral nutrition  -s/p PEG, continue TF and use of abdominal binder  - aspiration precautions     3. Enterobacter PNA  -s/p course of antibiotics     4. Anemia, no overt GI bleed. EGD unremarkable.   -hgb stable  -monitor for GI bleeding     5. Cdiff infection   -on vanco taper  -continue banatrol in feeds  -100cc loose stools rectal tube drainage bag, improving    6. DVT on a/c  -apixaban 5mg resumed  -continue PPI     7. Pneumothorax  -per CTS    Attending supervision statement: I have personally seen and examined the patient. I fully participated in the care of this patient. I have made amendments to the documentation where necessary, and agree with the history, physical exam, and plan as outlined by the ACP.    Attending supervision statement: I have personally seen and examined the patient. I fully participated in the care of this patient. I have made amendments to the documentation where necessary, and agree with the history, physical exam, and plan as outlined by the ACP.        [facility]  Gastroenterology and Hepatology  [address] [place], [place]  Office: [phone]  Cell: [phone]

## 2022-01-04 NOTE — PROGRESS NOTE ADULT - PROBLEM SELECTOR PLAN 1
Per ID- monitor low grade temp conservatively. If fever escalates, check blood/urine/sputum cx  - Ucx 12/30 with + ecoli thought to be a colonizer  - CXR 1/2 with increased left lower lobe opacity  - CT chest result noted: complete b/l lower lobe atelectasis and findings concerning for possible aspiration/infection  - patient with copious secretions, episode of hypoxia improved after suctioning  - would have low threshold for resuming IV abx (zosyn vs cefepime) given above findings especially if he has recurrent fever and clinically decompensates.  - ID following

## 2022-01-04 NOTE — PROGRESS NOTE ADULT - SUBJECTIVE AND OBJECTIVE BOX
Chief Complaint:  Patient is a 61y old  Male who presents with a chief complaint of IVH/SAH (03 Jan 2022 13:20)      Date of service 01-04-22 @ 11:52      Interval Events:   Patient seen and examined. Patient desatted last night to 85% and HR 130s. Given nebulizer and deep suctioning performed. 02 sat now 100% on trach collar, HR 110s. Brown loose stools noted in rectal tube bag.     Hospital Medications:  acetaminophen    Suspension .. 650 milliGRAM(s) Enteral Tube every 6 hours PRN  acetylcysteine 20%  Inhalation 4 milliLiter(s) Inhalation every 6 hours  albuterol/ipratropium for Nebulization 3 milliLiter(s) Nebulizer every 6 hours  apixaban 5 milliGRAM(s) Enteral Tube every 12 hours  artificial  tears Solution 1 Drop(s) Both EYES every 6 hours  aspirin  chewable 81 milliGRAM(s) Oral daily  atorvastatin 40 milliGRAM(s) Oral at bedtime  chlorhexidine 0.12% Liquid 15 milliLiter(s) Oral Mucosa two times a day  dextrose 50% Injectable 25 Gram(s) IV Push once  dextrose 50% Injectable 12.5 Gram(s) IV Push once  doxazosin 2 milliGRAM(s) Oral daily  insulin lispro (ADMELOG) corrective regimen sliding scale   SubCutaneous every 6 hours  insulin NPH human recombinant 12 Unit(s) SubCutaneous every 6 hours  metoprolol tartrate 12.5 milliGRAM(s) Oral two times a day  multivitamin 1 Tablet(s) Oral daily  pantoprazole  Injectable 40 milliGRAM(s) IV Push daily  sodium chloride 3%  Inhalation 3 milliLiter(s) Inhalation every 6 hours  vancomycin    Solution 125 milliGRAM(s) Oral every 6 hours        Review of Systems:  unable to obtain     PHYSICAL EXAM:   Vital Signs:  Vital Signs Last 24 Hrs  T(C): 37.7 (04 Jan 2022 11:29), Max: 37.7 (04 Jan 2022 11:29)  T(F): 99.9 (04 Jan 2022 11:29), Max: 99.9 (04 Jan 2022 11:29)  HR: 120 (04 Jan 2022 11:29) (108 - 120)  BP: 108/69 (04 Jan 2022 11:29) (108/69 - 148/73)  BP(mean): --  RR: 19 (04 Jan 2022 11:29) (18 - 100)  SpO2: 97% (04 Jan 2022 11:29) (97% - 100%)  Daily     Daily       PHYSICAL EXAM:     GENERAL:  Appears stated age, well-groomed, well-nourished, no distress  HEENT:  NC/AT,  conjunctivae anicteric, clear and pink,   NECK: supple, trachea midline, +trach   CHEST:  Full & symmetric excursion, no increased effort, breath sounds clear  HEART:  Regular rhythm, no JVD  ABDOMEN:  Soft, non-tender, non-distended, normoactive bowel sounds,  no masses , no hepatosplenomegaly, +peg  EXTREMITIES:  no cyanosis,clubbing or edema  SKIN:  No rash, erythema, or, ecchymoses, no jaundice  NEURO:  non-focal, no asterixis  RECTAL: Deferred      LABS Personally reviewed by me:                        8.8    11.52 )-----------( 424      ( 04 Jan 2022 07:25 )             28.3     Mean Cell Volume: 93.4 fl (01-04-22 @ 07:25)    01-04    135  |  94<L>  |  22  ----------------------------<  167<H>  4.9   |  28  |  0.55    Ca    8.8      04 Jan 2022 07:23                                    8.8    11.52 )-----------( 424      ( 04 Jan 2022 07:25 )             28.3                         8.0    6.67  )-----------( 359      ( 02 Jan 2022 06:47 )             25.9       Imaging personally reviewed by me:

## 2022-01-04 NOTE — PROGRESS NOTE ADULT - ASSESSMENT
A/p 61M with PMH of HTN, CAD s/p stent on DAPT, T2DM who complained of headache and subsequently became unresponsive found to have posterior fossa hemorrhage, IVH, hydrocephalus, s/p EVD and SOC on 11/4. 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 Course c/b respiratory failure and dysphagia s/p trach/PEG (11/19), VPS (11/23), completed course of cefepime for enterobacter and pseudomonas pneumonia on 11/21, fevers, c. diff colitis, urinary retention, b/l distal DVT. New fevers again on 12/22 with diarrhea found to have recurrent c. diff infection.    Wound Consult to assist w/ management of Sacral/bilateral Buttocks deep tissue injury  Incontinence of bowel  Incontinence Dermatitis  Resolved superimposed fungal rash    Recommend:  1.) sacral/buttock injury -Allevyn       Bilateral groins - InterdryAg after cleansing   2.) Incontinence Management - continue pericare BID, w/ assist of attends underpads            and fecal management system  as per protocol       persistent diarrhea- probiotic and banatrol started, consider metamucil or senna  3.) Maintain on an alternating air with low air loss surface  4.) Turn and reposition Q 2 hours  5.) Nutrition optimization w/ Moderate Malnutrition, Increased Nutrient Needs w/ TF, vit c, mvi, luz marina, & probiotics  6.) Hyperglycemia improving w/ glucerna TF and NPH insulin w/ FS & ISS  7.) Abx as per ID for cdiff, continue use of FMS as appropriate  8.) Offload heels/feet with complete cair air fluidized boots; ensure that the soles of the feet are not resting on the foot board of the bed.  Care as per medicine, Will continue to follow with you  Upon discharge f/u as outpatient at [facility] [address] [facility] [phone]  s/w attng and d/w team & RN  Rosario Gongora PA-C, CWS [id number]  I spent 25mnutes face to face w/ this pt of which more than 50% of the time was spent counseling & coordinating care of this pt.

## 2022-01-04 NOTE — PROGRESS NOTE ADULT - SUBJECTIVE AND OBJECTIVE BOX
[facility] Division of Hospital Medicine  Ros Mcgregor MD  spectra [phone]    Patient is a 61y old  Male who presents with a chief complaint of IVH/SAH (03 Jan 2022 13:20)      SUBJECTIVE / OVERNIGHT EVENTS: unable to obtain ROS from the patient. per staff, still with loose BM but appears to be decreasing. per neurosurgery, episode of desatting requiring suctioning with copious amount of secretions.     ADDITIONAL REVIEW OF SYSTEMS:    MEDICATIONS  (STANDING):  acetylcysteine 20%  Inhalation 4 milliLiter(s) Inhalation every 6 hours  albuterol/ipratropium for Nebulization 3 milliLiter(s) Nebulizer every 6 hours  apixaban 5 milliGRAM(s) Enteral Tube every 12 hours  artificial  tears Solution 1 Drop(s) Both EYES every 6 hours  aspirin  chewable 81 milliGRAM(s) Oral daily  atorvastatin 40 milliGRAM(s) Oral at bedtime  chlorhexidine 0.12% Liquid 15 milliLiter(s) Oral Mucosa two times a day  dextrose 50% Injectable 25 Gram(s) IV Push once  dextrose 50% Injectable 12.5 Gram(s) IV Push once  doxazosin 2 milliGRAM(s) Oral daily  insulin lispro (ADMELOG) corrective regimen sliding scale   SubCutaneous every 6 hours  insulin NPH human recombinant 12 Unit(s) SubCutaneous every 6 hours  metoprolol tartrate 12.5 milliGRAM(s) Oral two times a day  multivitamin 1 Tablet(s) Oral daily  pantoprazole  Injectable 40 milliGRAM(s) IV Push daily  sodium chloride 3%  Inhalation 3 milliLiter(s) Inhalation every 6 hours  vancomycin    Solution 125 milliGRAM(s) Oral every 6 hours    MEDICATIONS  (PRN):  acetaminophen    Suspension .. 650 milliGRAM(s) Enteral Tube every 6 hours PRN Temp greater or equal to 38C (100.4F), Mild Pain (1 - 3)      CAPILLARY BLOOD GLUCOSE      POCT Blood Glucose.: 185 mg/dL (04 Jan 2022 11:58)  POCT Blood Glucose.: 165 mg/dL (04 Jan 2022 06:04)  POCT Blood Glucose.: 138 mg/dL (04 Jan 2022 00:16)  POCT Blood Glucose.: 156 mg/dL (03 Jan 2022 16:20)    I&O's Summary    03 Jan 2022 07:01  -  04 Jan 2022 07:00  --------------------------------------------------------  IN: 0 mL / OUT: 2050 mL / NET: -2050 mL        PHYSICAL EXAM:  Vital Signs Last 24 Hrs  T(C): 37.7 (04 Jan 2022 11:29), Max: 37.7 (04 Jan 2022 11:29)  T(F): 99.9 (04 Jan 2022 11:29), Max: 99.9 (04 Jan 2022 11:29)  HR: 120 (04 Jan 2022 11:29) (108 - 120)  BP: 108/69 (04 Jan 2022 11:29) (108/69 - 148/73)  BP(mean): --  RR: 19 (04 Jan 2022 11:29) (18 - 19)  SpO2: 97% (04 Jan 2022 11:29) (97% - 100%)    CONSTITUTIONAL: NAD, well-developed  NECK: + trach  RESPIRATORY: Normal respiratory effort; scattered rhonchi  CARDIOVASCULAR: normal S1 and S2, no murmur/rub/gallop; No lower extremity edema  ABDOMEN: Nontender to palpation, normoactive bowel sounds, no rebound/guarding; + PEG  MUSCULOSKELETAL:  no clubbing or cyanosis of digits; no joint swelling or tenderness to palpation  PSYCH: eyes open  NEURO: doesn't follow commands  SKIN: No rashes; warm to touch    LABS:                        8.8    11.52 )-----------( 424      ( 04 Jan 2022 07:25 )             28.3     01-04    135  |  94<L>  |  22  ----------------------------<  167<H>  4.9   |  28  |  0.55    Ca    8.8      04 Jan 2022 07:23                  RADIOLOGY & ADDITIONAL TESTS:  Results Reviewed:     < from: CT Chest No Cont (01.03.22 @ 22:34) >  IMPRESSION:  Complete bilateral lower lobe atelectasis; superimposed aspiration cannot   be excluded on this noncontrast CT. Additional centrilobular nodules in   the left upper lobe are concerning for aspiration/infection.    Small left and trace right pleural effusions. New trace anterior left   pneumothorax.    High-riding tracheostomy tube; consider advancing.    < end of copied text >    Imaging Personally Reviewed:  Electrocardiogram Personally Reviewed:    COORDINATION OF CARE:  Care Discussed with Consultants/Other Providers [Y/N]: neurosurgery PA(Abigail)  Prior or Outpatient Records Reviewed [Y/N]:

## 2022-01-04 NOTE — PROGRESS NOTE ADULT - ASSESSMENT
61M with PMH of HTN, CAD s/p stent on DAPT, T2DM who complained of headache and subsequently became unresponsive found to have posterior fossa hemorrhage, IVH, hydrocephalus, s/p EVD and SOC on 11/4. 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 Course c/b respiratory failure and dysphagia s/p trach/PEG (11/19), VPS (11/23),Certas @4 completed course of cefepime for enterobacter and pseudomonas pneumonia on 11/21, fevers, c. diff colitis, urinary retention, b/l distal DVT. Trach changed 12/15 to #7 cuffless due to dislodgement. 12/16 CT A/P wo concern for infectious process. but concern for bladder lesion, wife will pursue possible treatment after rehab. Now with another bout of cdiff found after fever workup, on PO vanco per ID, blood cultures negative to date. +Rectal tube. 12/31 Rectal tube replaced for leaking, Trach became dislodged and replaced by ENT. CXR shows small left pneumothorax. Repeat CXR shows stable small left pneumothorax (no intervention)    PLAN:  Neuro:   - shunt  Certas @4  - PICA aneurysm resection  - pt is DNR  - Palliative consult following family meeting today     Respiratory:   - trach replaced by ENT for dislodgement  - CXR shows small left pneumothorax, repeat CXR is stable small left pneumothorax, evaluated by Gen Surg- no intervention at this time  - continue duoneb and mucomyst  - trach care, suction prn which at this point is every 1-2 hours   - PE/DVT- on apixaban  CV:  - CAD - hx of stent- on ASA 81 mg   - acute post op blood loss anemia improved after transfusion PRBC 12/22  - HTN- continue metoprolol  - on doxazosin for urinary retention  Endocrine:   - DMT2- continue fingersticks with NPH 13u q6  Heme:  - H/H is 8.0/25.9- stable anemia  DVT ppx:   - on eliquis for dvt  Renal:   - hypernatremia- Na 136, continue free water 200q6  - MICHELLE has resolved  -Urinary retention- requiring straight cath q6  - Urology following for renal lesion, PSA/Cytology pending, GOC necessary for any urologic intervention  - f/u urine cytology is negative for high grade urothelial carcinoma  ID:   - ID following: if high temp- send cultures, monitor fever,   - recurrent fever and diarrhea- cdiff again positive, PO vanco restarted (completed course earlier in stay)- ID recs appreciated, continue vanc 2 more days then slow taper- 125 mg tid x 1 week then 125 mg bid x 1 week then 125 mg daily x 1 week  - MRI as above  - follow up CT scan of C/A/P to look for occult source of infection, found to have 2.4 cm nodular soft tissue density, concerning for bladder lesion, 1.7 cm left renal lesion  - CTX for pulmonary coverage if he deteriorates per ID  - c/w PO vanco started 12/22 with plan for 14 days of therapy then month long taper  - if spikes again, may need LP or 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  GI:    - tolerating tube feeds via PEG  - rectal tube in place  PT/OT:   - CHUYITA    Per Hospitalist note, it may be feasible at this time to monitor patient's progress for meaningful recovery over weeks, months and then decide whether there is benefit of pursuing workup of bladder lesion. Spouse in agreement.    Spectralink # [id number]

## 2022-01-04 NOTE — PROGRESS NOTE ADULT - NS MD NEURO CONDITIONS_ENCEPHAL
Neurological

## 2022-01-04 NOTE — CHART NOTE - NSCHARTNOTEFT_GEN_A_CORE
Spoke with wife and updated her on patient's clinical condition. Expressed the concern that he is having a lot of secreations and the possibility of mucus plug that can cause arrest.  Wife understands.

## 2022-01-04 NOTE — PROGRESS NOTE ADULT - PROBLEM SELECTOR PLAN 11
CT a/p with a 2.4 cm nodular soft tissue density in the bladder appears separate from the prostate gland, concerning for bladder lesion  - as per urology - findings concerning for bladder tumor vs prostate gland. cysto transurethral resection would require general anesthesia. recommended checking urine cytology to determine next steps which returned negative. will still need outpatient urology follow-up regardless once acute issues improve  - PSA normal, urine cytology negative for malignancy  - previously spoke to the patient's wife, Sandrita Bauer [phone]. Given the patient's underlying comorbid conditions coupled with his recent devastating neurologic events, other medical complications that ensued, and his poor neurologic and functional status at this time, it may be prudent to monitor his progress and see if he makes any meaningful recovery over the coming weeks to months to decide whether the benefit of pursuing any invasive work up for the bladder lesion outweighs the risk. Spouse seems to be in agreement.  - Will require eventual outpatient f/u with Urology

## 2022-01-04 NOTE — PROGRESS NOTE ADULT - SUBJECTIVE AND OBJECTIVE BOX
CC: f/u for C Diff and fever    Patient reports: he appears comfortable, is non verbal    REVIEW OF SYSTEMS:  All other review of systems negative (Comprehensive ROS)    Antimicrobials Day #  :day 14/14  vancomycin    Solution 125 milliGRAM(s) Oral every 6 hours    Other Medications Reviewed    Vital Signs Last 24 Hrs  T(C): 37.7 (04 Jan 2022 11:29), Max: 37.7 (04 Jan 2022 11:29)  T(F): 99.9 (04 Jan 2022 11:29), Max: 99.9 (04 Jan 2022 11:29)  HR: 120 (04 Jan 2022 11:29) (108 - 120)  BP: 108/69 (04 Jan 2022 11:29) (108/69 - 148/73)  BP(mean): --  RR: 19 (04 Jan 2022 11:29) (18 - 19)  SpO2: 97% (04 Jan 2022 11:29) (97% - 100%)    PHYSICAL EXAM:  General: alert, no acute distress  Eyes:  anicteric, no conjunctival injection, no discharge  Oropharynx: no lesions or injection 	  Neck: supple, trach collar  Lungs: clear to auscultation  Heart: regular rate and rhythm; no murmur, rubs or gallops  Abdomen: soft, nondistended, nontender, without mass or organomegaly, peg  Skin: no lesions  Extremities: no clubbing, cyanosis, or edema  Neurologic: poorly interactive  : teixeira  rectal tube    LAB RESULTS:                                              8.8    11.52 )-----------( 424      ( 04 Jan 2022 07:25 )             28.3         MICROBIOLOGY:  RECENT CULTURES:  12-30 @ 18:25 Catheterized Catheterized     >100,000 CFU/ml Escherichia coli          RADIOLOGY REVIEWED:  < from: Xray Chest 1 View- PORTABLE-Urgent (Xray Chest 1 View- PORTABLE-Urgent .) (01.01.22 @ 06:00) >  IMPRESSION: Small left pneumothorax, unchanged. Stable mild haziness in   the left upper lobe which may represent pneumonia.    --- End of Report     < end of copied text >

## 2022-01-04 NOTE — PROGRESS NOTE ADULT - PROBLEM SELECTOR PLAN 2
completed course of PO vanco previously.  developed recurrent fever and diarrhea. found to have recurrent c. diff infection.  - consider d/c rectal tube soon if the output is decreasing  - c/w PO vanco started on 12/22 with plan for 14 days of therapy and month long taper  - continue PO vanco qid today then slow taper, 125 tid x 1 week, 125 BID x 1 week, 125 daily x 1 week, and 125 every other day x 2 weeks  - judicious use of abx therapy as may exacerbate c. diff

## 2022-01-04 NOTE — PROGRESS NOTE ADULT - NSPROGADDITIONALINFOA_GEN_ALL_CORE
Overall prognosis is guarded. Palliative re-consulted for further GOC. Patient is DNR. ? consider hospice. plan for family meeting with palliative.

## 2022-01-04 NOTE — PROGRESS NOTE ADULT - PROBLEM SELECTOR PLAN 5
- trach dislodged a couple of times requiring adjustment by ENT. CT chest with high riding trach tube. ENT follow up  - continue trach care and suctioning  - monitor O2 sats  - continue nebs. change to xopenex if tachycardia persists but overall improved  - small pneumothorax appears to be stable- appreciate thoracic surgery input  - chest PT if able  - consider pulm/RCU consult for advanced respiratory care management including trach/secretions, etc

## 2022-01-04 NOTE — PROGRESS NOTE ADULT - ASSESSMENT
61M with PMH of HTN, CAD s/p stent on DAPT, T2DM who complained of headache and subsequently became unresponsive found to have posterior fossa hemorrhage, IVH, hydrocephalus, s/p EVD and SOC on 11/4. 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 Course c/b respiratory failure and dysphagia s/p trach/PEG (11/19), VPS (11/23), completed course of cefepime for enterobacter and pseudomonas pneumonia on 11/21, fevers, c. diff colitis, urinary retention, b/l distal DVT. New fevers again on 12/22 with diarrhea found to have recurrent c. diff infection on PO vanco. Trach dislodged again on 12/31, replaced by ENT, c/b small left pneumothorax, recurrent fever.

## 2022-01-04 NOTE — PROGRESS NOTE ADULT - SUBJECTIVE AND OBJECTIVE BOX
SUBJECTIVE: Patient seen and examined.   Patient satted to 80s.  Patient had decreased left lung sounds.  I suction and got out alot of mucus.  Sats went to 90s.  Most likely mucus plus   Vital Signs Last 24 Hrs  T(C): 36.8 (04 Jan 2022 08:10), Max: 37.3 (03 Jan 2022 12:05)  T(F): 98.3 (04 Jan 2022 08:10), Max: 99.1 (03 Jan 2022 12:05)  HR: 108 (04 Jan 2022 08:10) (108 - 110)  BP: 148/73 (04 Jan 2022 08:10) (122/79 - 148/73)  BP(mean): --  RR: 120 (04 Jan 2022 08:10) (18 - 120)  SpO2: 100% (04 Jan 2022 08:10) (98% - 100%)    PHYSICAL EXAM:    Constitutional: No Acute Distress     Neurological: trach collar, no follow comands uppers 0/5 and le wds     Pulmonary: Clear to Auscultation, No rales, No rhonchi, No wheezes     Cardiovascular: S1, S2, Regular rate and rhythm     Gastrointestinal: Soft, Non-tender, Non-distended     Extremities: No calf tenderness     LABS:                        8.8    11.52 )-----------( 424      ( 04 Jan 2022 07:25 )             28.3    01-04    135  |  94<L>  |  22  ----------------------------<  167<H>  4.9   |  28  |  0.55    Ca    8.8      04 Jan 2022 07:23        01-03 @ 07:01  -  01-04 @ 07:00  --------------------------------------------------------  IN: 0 mL / OUT: 2050 mL / NET: -2050 mL      DRAINS:     MEDICATIONS:  Anticoagulation:   apixaban 5 milliGRAM(s) Enteral Tube every 12 hours  aspirin  chewable 81 milliGRAM(s) Oral daily    Antibiotics:  vancomycin    Solution 125 milliGRAM(s) Oral every 6 hours    Endo:  atorvastatin 40 milliGRAM(s) Oral at bedtime  dextrose 50% Injectable 25 Gram(s) IV Push once  dextrose 50% Injectable 12.5 Gram(s) IV Push once  insulin lispro (ADMELOG) corrective regimen sliding scale   SubCutaneous every 6 hours  insulin NPH human recombinant 12 Unit(s) SubCutaneous every 6 hours    Neuro:  acetaminophen    Suspension .. 650 milliGRAM(s) Enteral Tube every 6 hours PRN Temp greater or equal to 38C (100.4F), Mild Pain (1 - 3)    Cardiac:  doxazosin 2 milliGRAM(s) Oral daily  metoprolol tartrate 12.5 milliGRAM(s) Oral two times a day    Pulm:  acetylcysteine 20%  Inhalation 4 milliLiter(s) Inhalation every 6 hours  albuterol/ipratropium for Nebulization 3 milliLiter(s) Nebulizer every 6 hours  sodium chloride 3%  Inhalation 3 milliLiter(s) Inhalation every 6 hours    GI/:  pantoprazole  Injectable 40 milliGRAM(s) IV Push daily    Other:   artificial  tears Solution 1 Drop(s) Both EYES every 6 hours  chlorhexidine 0.12% Liquid 15 milliLiter(s) Oral Mucosa two times a day  multivitamin 1 Tablet(s) Oral daily    DIET: glucerna @ 75     IMAGING:

## 2022-01-04 NOTE — CHART NOTE - NSCHARTNOTEFT_GEN_A_CORE
Called patient's wife to f/u regarding family meeting. Mrs. Camacho stated that she spoke with her daughters and they are not interested in meeting with palliative care at this time. She feels that although patient has had setback he is "positively responding" and they would like to continue to give him a "fighting chance". Primary team updated. Based on family's wishes palliative team will sign off. If family would like palliative involvement at a later time please reconsult.    Brianna Jacobo MD  Palliative Medicine Attending   [facility] Pager [phone]

## 2022-01-04 NOTE — CHART NOTE - NSCHARTNOTEFT_GEN_A_CORE
The neurosurgical PA Abigail called us today to question a reading about a tracheostomy seen on CT scan:  < from: CT Chest No Cont (01.03.22 @ 22:34) >    INTERPRETATION:  CLINICAL INFORMATION: Stroke. Concern for pneumonia.    COMPARISON: CT chest 12/16/2021.    CONTRAST/COMPLICATIONS:  IV Contrast: None.  Oral Contrast: None.  Complications: None reported.    PROCEDURE:  CT of the Chest was performed.  Sagittal and coronal reformats were performed.    FINDINGS:    LUNGS, AIRWAYS AND PLEURA: High riding tracheostomy tube. There are small   left and trace right pleural effusions with complete lower lobe   compressive atelectasis. There is a new small left anterior pneumothorax.   There are new centrilobular groundglass nodules throughout the left upper   lobe, likely infectious/inflammatory.    MEDIASTINUM AND ANABELLA: No thoracic lymphadenopathy.    HEART/VESSELS: The heart is normal in size. The great vessels are normal   in size. Coronary arterial calcification with RCA stenting. No   pericardial effusion.    VISUALIZED UPPER ABDOMEN: Trace perihepatic ascites. Percutaneous   gastrostomy tube with intraluminal balloon. Hyperdense left renal nodules   may represent hemorrhagic/proteinaceous cysts.    BONES/SOFT TISSUES: Partially imaged ventriculoperitoneal shunt courses   along the right anterior chest and abdominal wall.    IMPRESSION:  Complete bilateral lower lobe atelectasis; superimposed aspiration cannot   be excluded on this noncontrast CT. Additional centrilobular nodules in   the left upper lobe are concerning for aspiration/infection.    Small left and trace right pleural effusions. New trace anterior left   pneumothorax.    High-riding tracheostomy tube; consider advancing.    --- End of Report ---    < end of copied text >      Dr. Boone and Dr. Jaja Powers had a discussion regarding the reading of "high riding tracheostomy tube, consider advancement." The tracheostomy tube flange is lying flush against the neck and cannot be advanced any further. The tube is within the tracheal lumen but can be dislodged. If the pt. requires to go back on the ventillator or the balloon on trach needs to be inflated , we may need to replace his current trach with an XLT as balloon may not be within tracheal lumen. This was discussed with Corey TRIPP from neurosurgery.

## 2022-01-04 NOTE — PROGRESS NOTE ADULT - SUBJECTIVE AND OBJECTIVE BOX
[facility]-- WOUND TEAM -- FOLLOW UP NOTE  --------------------------------------------------------------------------------    24 hour events/subjective:    tolerating TF  incontinent  (+)FMS  afebrile  palliative consult appreciated- ongoing GOC    Diet:  Diet, NPO with Tube Feed:   Tube Feeding Modality: Gastrostomy  Glucerna 1.2 Blake (GLUCERNARTH)  Total Volume for 24 Hours (mL): 1800  Continuous  Until Goal Tube Feed Rate (mL per Hour): 75  Tube Feed Duration (in Hours): 24  Tube Feed Start Time: 12:00  Free Water Flush  Bolus   Total Volume per Flush (mL): 250   Frequency: Every 6 Hours  Charly(7 Gm Arginine/7 Gm Glut/1.2 Gm HMB     Qty per Day:  2  Banatrol TF     Qty per Day:  2  Supplement Feeding Modality:  Gastrostomy  Probiotic Yogurt/Smoothie Cans or Servings Per Day:  2       Frequency:  Daily (01-03-22 @ 12:58)      ROS: pt unable to offer    ALLERGIES & MEDICATIONS  --------------------------------------------------------------------------------  Allergies  penicillin (Other)      STANDING INPATIENT MEDICATIONS  acetylcysteine 20%  Inhalation 4 milliLiter(s) Inhalation every 6 hours  albuterol/ipratropium for Nebulization 3 milliLiter(s) Nebulizer every 6 hours  apixaban 5 milliGRAM(s) Enteral Tube every 12 hours  artificial  tears Solution 1 Drop(s) Both EYES every 6 hours  aspirin  chewable 81 milliGRAM(s) Oral daily  atorvastatin 40 milliGRAM(s) Oral at bedtime  chlorhexidine 0.12% Liquid 15 milliLiter(s) Oral Mucosa two times a day  dextrose 50% Injectable 25 Gram(s) IV Push once  dextrose 50% Injectable 12.5 Gram(s) IV Push once  doxazosin 2 milliGRAM(s) Oral daily  insulin lispro (ADMELOG) corrective regimen sliding scale   SubCutaneous every 6 hours  insulin NPH human recombinant 12 Unit(s) SubCutaneous every 6 hours  metoprolol tartrate 12.5 milliGRAM(s) Oral two times a day  multivitamin 1 Tablet(s) Oral daily  pantoprazole  Injectable 40 milliGRAM(s) IV Push daily  sodium chloride 3%  Inhalation 3 milliLiter(s) Inhalation every 6 hours  vancomycin    Solution 125 milliGRAM(s) Oral every 6 hours      PRN INPATIENT MEDICATION  acetaminophen    Suspension .. 650 milliGRAM(s) Enteral Tube every 6 hours PRN        VITALS/PHYSICAL EXAM  --------------------------------------------------------------------------------  T(C): 37.7 (01-04-22 @ 11:29), Max: 37.7 (01-04-22 @ 11:29)  HR: 120 (01-04-22 @ 11:29) (108 - 120)  BP: 108/69 (01-04-22 @ 11:29) (108/69 - 148/73)  RR: 19 (01-04-22 @ 11:29) (18 - 19)  SpO2: 97% (01-04-22 @ 11:29) (97% - 100%)  Wt(kg): --        01-03-22 @ 07:01  -  01-04-22 @ 07:00  --------------------------------------------------------  IN: 0 mL / OUT: 2050 mL / NET: -2050 mL    Physical Exam:  General: Obtunded  HEENT: NC/AT, PERRLA, mucosa moist, trach collar, neck supple  Gastrointestinal: soft NT/ND (+)PEG  (+)FMS  Neurology: nonverbal, not follow commands  Musculoskeletal: no contractures  Vascular: BLE edema equal, equally warm  Skin: moist w/ good turogr   Sacral/bilateral buttocks 6cm x 4cm x 0.1cm w/ denuded and partial thickness skin        loss w/ improving evolving DTI      scant serosanguinous drainage,      periwound skin is intact w/o blistering - no longer macerated and erythematous.     Bilateral groins intact pale erythema w/o drainage or blistering  No odor, increased warmth, tenderness, induration, fluctuance          LABS/ CULTURES/ RADIOLOGY:              8.8    11.52 >-----------<  424      [01-04-22 @ 07:25]              28.3     135  |  94  |  22  ----------------------------<  167      [01-04-22 @ 07:23]  4.9   |  28  |  0.55        Ca     8.8     [01-04-22 @ 07:23]        CAPILLARY BLOOD GLUCOSE  POCT Blood Glucose.: 185 mg/dL (04 Jan 2022 11:58)  POCT Blood Glucose.: 165 mg/dL (04 Jan 2022 06:04)  POCT Blood Glucose.: 138 mg/dL (04 Jan 2022 00:16)  POCT Blood Glucose.: 156 mg/dL (03 Jan 2022 16:20)    A1C with Estimated Average Glucose Result: 6.5 % (11-04-21 @ 21:43)

## 2022-01-04 NOTE — PROGRESS NOTE ADULT - ASSESSMENT
60 yo male with PMH of HTN   Admitted on 11/4 with severe headache and found to have cerebellar bleed.  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  S/p trach and peg.   He has distal DVT's - on apixaban.  He received cefepime 11/4-11/21 for respiratory coverage.  Developed watery C diff diarrhea on 11/26 - started on oral vancomycin   Then restarted on cefepime on 11/28 for concerns of pneumonia since had low grade temps. Giorgio neb added later  But CXR's remained clear.  Ct of brain showed residual blood.  Pseudomonas in sputum found to be resistant to carbapenems & quinolones. Sputum isolated strep pneumoniae as well    11/28 urine and blood cultures are negative.  Cefepime completed on 12/06 & Giorgio nebs on 12/07/21  Failed TOV & teixeira was replaced for retention of 1000 ml of urine on 12/06/21 PM  Spiked a fever to 102F ? in response to retention  UA with 3 WBCs, no nit or LE, large blood   CXR with clear lungs  CTH revealed a Pseudomeningocele, s/p tap with gram stain without organism  CSF finalized as no growth  Blood & urine cx also finalized as no growth  Completed treatment for C diff w 2 wks of oral vanco on 12/10/21  Diarrhea resolved  He developed recurrent fever and relapsed with C diff  Vanco restarted on 12/22  He has a rectal tube in place.  At high risk for aspiration and  infections given history of retention and poor mental status.  His fecal output has decreased  He still requires ISC  CXR 12/31 with small left PTX  pyuria acceptable with ISC, he now has a teixeira  CXR read as possible KATHERINE  infiltrate  Low grade fever yesterday  but is otherwise clinically stable  E coli in urine can be viewed as coonizer  vitals reviewed the patient is afebrile     Plan   continue  Vanco day 14 of 14  qid then slow taper , 125 tid x 1 week, 125 BID x 1 week, 125 daily x 1 week, and 125 every other day x 2 weeks  continue supportive care as per primary team      62 yo male with PMH of HTN   Admitted on 11/4 with severe headache and found to have cerebellar bleed.  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  S/p trach and peg.   He has distal DVT's - on apixaban.  He received cefepime 11/4-11/21 for respiratory coverage.  Developed watery C diff diarrhea on 11/26 - started on oral vancomycin   Then restarted on cefepime on 11/28 for concerns of pneumonia since had low grade temps. Giorgio neb added later  But CXR's remained clear.  Ct of brain showed residual blood.  Pseudomonas in sputum found to be resistant to carbapenems & quinolones. Sputum isolated strep pneumoniae as well    11/28 urine and blood cultures are negative.  Cefepime completed on 12/06 & Giorgio nebs on 12/07/21  Failed TOV & teixeira was replaced for retention of 1000 ml of urine on 12/06/21 PM  Spiked a fever to 102F ? in response to retention  UA with 3 WBCs, no nit or LE, large blood   CXR with clear lungs  CTH revealed a Pseudomeningocele, s/p tap with gram stain without organism  CSF finalized as no growth  Blood & urine cx also finalized as no growth  Completed treatment for C diff w 2 wks of oral vanco on 12/10/21  Diarrhea resolved  He developed recurrent fever and relapsed with C diff  Vanco restarted on 12/22  He has a rectal tube in place.  At high risk for aspiration and  infections given history of retention and poor mental status.  His fecal output has decreased  He still requires ISC  CXR 12/31 with small left PTX  pyuria acceptable with ISC, he now has a teixeira  CXR read as possible KATHERINE  infiltrate  Low grade fever yesterday  but is otherwise clinically stable  E coli in urine can be viewed as coonizer  vitals reviewed the patient is afebrile     Plan   continue  Vanco day 14 of 14  qid then slow taper , 125 tid x 1 week, 125 BID x 1 week, 125 daily x 1 week, and 125 every other day x 2 weeks  continue supportive care as per primary team     Addendum:  discussed case with Hospitalist, the patient noted today to have increase secretions, CT of chest done and reported bilateral atelectasis with possible aspiration pna  will start empiric Zosyn 3.375 g IV q8, also recommended to obtain a respiratory culture. will continue with po vancomycin      60 yo male with PMH of HTN   Admitted on 11/4 with severe headache and found to have cerebellar bleed.  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  S/p trach and peg.   He has distal DVT's - on apixaban.  He received cefepime 11/4-11/21 for respiratory coverage.  Developed watery C diff diarrhea on 11/26 - started on oral vancomycin   Then restarted on cefepime on 11/28 for concerns of pneumonia since had low grade temps. Giorgio neb added later  But CXR's remained clear.  Ct of brain showed residual blood.  Pseudomonas in sputum found to be resistant to carbapenems & quinolones. Sputum isolated strep pneumoniae as well    11/28 urine and blood cultures are negative.  Cefepime completed on 12/06 & Giorgio nebs on 12/07/21  Failed TOV & teixeira was replaced for retention of 1000 ml of urine on 12/06/21 PM  Spiked a fever to 102F ? in response to retention  UA with 3 WBCs, no nit or LE, large blood   CXR with clear lungs  CTH revealed a Pseudomeningocele, s/p tap with gram stain without organism  CSF finalized as no growth  Blood & urine cx also finalized as no growth  Completed treatment for C diff w 2 wks of oral vanco on 12/10/21  Diarrhea resolved  He developed recurrent fever and relapsed with C diff  Vanco restarted on 12/22  He has a rectal tube in place.  At high risk for aspiration and  infections given history of retention and poor mental status.  His fecal output has decreased  He still requires ISC  CXR 12/31 with small left PTX  pyuria acceptable with ISC, he now has a teixeira  CXR read as possible KATHERINE  infiltrate  Low grade fever yesterday  but is otherwise clinically stable  E coli in urine can be viewed as coonizer  vitals reviewed the patient is afebrile     Plan   continue  Vanco day 14 of 14  qid then slow taper , 125 tid x 1 week, 125 BID x 1 week, 125 daily x 1 week, and 125 every other day x 2 weeks  continue supportive care as per primary team     Addendum:  discussed case with Hospitalist, the patient noted today to have increase secretions, CT of chest done and reported bilateral atelectasis with possible aspiration pna  will start cefepime and flagyl for five days to tx aspiration pna , also recommended to obtain a respiratory culture. will continue with po vancomycin

## 2022-01-05 LAB
CULTURE RESULTS: SIGNIFICANT CHANGE UP
GLUCOSE BLDC GLUCOMTR-MCNC: 107 MG/DL — HIGH (ref 70–99)
GLUCOSE BLDC GLUCOMTR-MCNC: 158 MG/DL — HIGH (ref 70–99)
GLUCOSE BLDC GLUCOMTR-MCNC: 177 MG/DL — HIGH (ref 70–99)
GLUCOSE BLDC GLUCOMTR-MCNC: 182 MG/DL — HIGH (ref 70–99)
GLUCOSE BLDC GLUCOMTR-MCNC: 75 MG/DL — SIGNIFICANT CHANGE UP (ref 70–99)
SPECIMEN SOURCE: SIGNIFICANT CHANGE UP

## 2022-01-05 PROCEDURE — 99233 SBSQ HOSP IP/OBS HIGH 50: CPT

## 2022-01-05 PROCEDURE — 99232 SBSQ HOSP IP/OBS MODERATE 35: CPT

## 2022-01-05 RX ORDER — VANCOMYCIN HCL 1 G
125 VIAL (EA) INTRAVENOUS THREE TIMES A DAY
Refills: 0 | Status: COMPLETED | OUTPATIENT
Start: 2022-01-05 | End: 2022-01-12

## 2022-01-05 RX ADMIN — HUMAN INSULIN 12 UNIT(S): 100 INJECTION, SUSPENSION SUBCUTANEOUS at 17:20

## 2022-01-05 RX ADMIN — Medication 3 MILLILITER(S): at 23:38

## 2022-01-05 RX ADMIN — Medication 2 MILLIGRAM(S): at 05:41

## 2022-01-05 RX ADMIN — Medication 3 MILLILITER(S): at 17:20

## 2022-01-05 RX ADMIN — HUMAN INSULIN 12 UNIT(S): 100 INJECTION, SUSPENSION SUBCUTANEOUS at 00:30

## 2022-01-05 RX ADMIN — Medication 100 MILLIGRAM(S): at 05:43

## 2022-01-05 RX ADMIN — Medication 100 MILLIGRAM(S): at 14:58

## 2022-01-05 RX ADMIN — Medication 4 MILLILITER(S): at 23:38

## 2022-01-05 RX ADMIN — Medication 4 MILLILITER(S): at 01:16

## 2022-01-05 RX ADMIN — Medication 1 DROP(S): at 23:39

## 2022-01-05 RX ADMIN — Medication 3 MILLILITER(S): at 01:16

## 2022-01-05 RX ADMIN — Medication 2: at 12:40

## 2022-01-05 RX ADMIN — Medication 2: at 23:37

## 2022-01-05 RX ADMIN — SODIUM CHLORIDE 3 MILLILITER(S): 9 INJECTION INTRAMUSCULAR; INTRAVENOUS; SUBCUTANEOUS at 14:57

## 2022-01-05 RX ADMIN — Medication 125 MILLIGRAM(S): at 01:16

## 2022-01-05 RX ADMIN — Medication 2: at 17:19

## 2022-01-05 RX ADMIN — Medication 100 MILLIGRAM(S): at 21:34

## 2022-01-05 RX ADMIN — Medication 1 DROP(S): at 14:56

## 2022-01-05 RX ADMIN — SODIUM CHLORIDE 3 MILLILITER(S): 9 INJECTION INTRAMUSCULAR; INTRAVENOUS; SUBCUTANEOUS at 01:16

## 2022-01-05 RX ADMIN — Medication 4 MILLILITER(S): at 05:38

## 2022-01-05 RX ADMIN — CHLORHEXIDINE GLUCONATE 15 MILLILITER(S): 213 SOLUTION TOPICAL at 05:41

## 2022-01-05 RX ADMIN — Medication 125 MILLIGRAM(S): at 05:37

## 2022-01-05 RX ADMIN — CHLORHEXIDINE GLUCONATE 15 MILLILITER(S): 213 SOLUTION TOPICAL at 17:22

## 2022-01-05 RX ADMIN — SODIUM CHLORIDE 3 MILLILITER(S): 9 INJECTION INTRAMUSCULAR; INTRAVENOUS; SUBCUTANEOUS at 17:22

## 2022-01-05 RX ADMIN — Medication 125 MILLIGRAM(S): at 14:58

## 2022-01-05 RX ADMIN — ATORVASTATIN CALCIUM 40 MILLIGRAM(S): 80 TABLET, FILM COATED ORAL at 21:41

## 2022-01-05 RX ADMIN — APIXABAN 5 MILLIGRAM(S): 2.5 TABLET, FILM COATED ORAL at 17:22

## 2022-01-05 RX ADMIN — Medication 3 MILLILITER(S): at 14:57

## 2022-01-05 RX ADMIN — PANTOPRAZOLE SODIUM 40 MILLIGRAM(S): 20 TABLET, DELAYED RELEASE ORAL at 14:57

## 2022-01-05 RX ADMIN — Medication 12.5 MILLIGRAM(S): at 17:46

## 2022-01-05 RX ADMIN — HUMAN INSULIN 12 UNIT(S): 100 INJECTION, SUSPENSION SUBCUTANEOUS at 23:37

## 2022-01-05 RX ADMIN — SODIUM CHLORIDE 3 MILLILITER(S): 9 INJECTION INTRAMUSCULAR; INTRAVENOUS; SUBCUTANEOUS at 23:38

## 2022-01-05 RX ADMIN — Medication 4 MILLILITER(S): at 14:57

## 2022-01-05 RX ADMIN — CEFEPIME 100 MILLIGRAM(S): 1 INJECTION, POWDER, FOR SOLUTION INTRAMUSCULAR; INTRAVENOUS at 05:42

## 2022-01-05 RX ADMIN — Medication 125 MILLIGRAM(S): at 21:35

## 2022-01-05 RX ADMIN — Medication 4 MILLILITER(S): at 17:21

## 2022-01-05 RX ADMIN — Medication 3 MILLILITER(S): at 05:36

## 2022-01-05 RX ADMIN — SODIUM CHLORIDE 3 MILLILITER(S): 9 INJECTION INTRAMUSCULAR; INTRAVENOUS; SUBCUTANEOUS at 05:37

## 2022-01-05 RX ADMIN — Medication 1 DROP(S): at 01:16

## 2022-01-05 RX ADMIN — HUMAN INSULIN 12 UNIT(S): 100 INJECTION, SUSPENSION SUBCUTANEOUS at 12:40

## 2022-01-05 RX ADMIN — CEFEPIME 100 MILLIGRAM(S): 1 INJECTION, POWDER, FOR SOLUTION INTRAMUSCULAR; INTRAVENOUS at 17:23

## 2022-01-05 RX ADMIN — Medication 1 DROP(S): at 05:36

## 2022-01-05 RX ADMIN — Medication 1 TABLET(S): at 14:57

## 2022-01-05 RX ADMIN — Medication 1 DROP(S): at 17:21

## 2022-01-05 RX ADMIN — Medication 81 MILLIGRAM(S): at 14:56

## 2022-01-05 RX ADMIN — Medication 12.5 MILLIGRAM(S): at 05:41

## 2022-01-05 RX ADMIN — APIXABAN 5 MILLIGRAM(S): 2.5 TABLET, FILM COATED ORAL at 05:42

## 2022-01-05 NOTE — CONSULT NOTE ADULT - SUBJECTIVE AND OBJECTIVE BOX
CHIEF COMPLAINT: Altered mental status    Interval Events: Patient with increased secretions. Afebrile. Unable to provide history.     REVIEW OF SYSTEMS:  Unable to obtain.    OBJECTIVE:  ICU Vital Signs Last 24 Hrs  T(C): 36.8 (05 Jan 2022 07:58), Max: 37.7 (04 Jan 2022 11:29)  T(F): 98.3 (05 Jan 2022 07:58), Max: 99.9 (04 Jan 2022 11:29)  HR: 92 (05 Jan 2022 07:58) (92 - 120)  BP: 125/74 (05 Jan 2022 07:58) (108/69 - 131/87)  BP(mean): --  ABP: --  ABP(mean): --  RR: 18 (05 Jan 2022 07:58) (18 - 19)  SpO2: 99% (05 Jan 2022 07:58) (97% - 100%)        01-04 @ 07:01  -  01-05 @ 07:00  --------------------------------------------------------  IN: 1150 mL / OUT: 0 mL / NET: 1150 mL      CAPILLARY BLOOD GLUCOSE      POCT Blood Glucose.: 75 mg/dL (05 Jan 2022 05:27)      PHYSICAL EXAM:  General: Elderly male lying in bed, NAD  HEENT: NC/AT sclerae anicteric.  Neck: Trach in place  Respiratory: No increased WOB, no accessory muscle use. Decreased breath sounds at bases  Cardiovascular: S1, S2  Abdomen: Soft, + BS  Extremities: WWP  Neurological: Not interactive  Psychiatry: Unable to assess    HOSPITAL MEDICATIONS:  MEDICATIONS  (STANDING):  acetylcysteine 20%  Inhalation 4 milliLiter(s) Inhalation every 6 hours  albuterol/ipratropium for Nebulization 3 milliLiter(s) Nebulizer every 6 hours  apixaban 5 milliGRAM(s) Enteral Tube every 12 hours  artificial  tears Solution 1 Drop(s) Both EYES every 6 hours  aspirin  chewable 81 milliGRAM(s) Oral daily  atorvastatin 40 milliGRAM(s) Oral at bedtime  cefepime   IVPB      cefepime   IVPB 2000 milliGRAM(s) IV Intermittent every 12 hours  chlorhexidine 0.12% Liquid 15 milliLiter(s) Oral Mucosa two times a day  dextrose 50% Injectable 25 Gram(s) IV Push once  dextrose 50% Injectable 12.5 Gram(s) IV Push once  doxazosin 2 milliGRAM(s) Oral daily  insulin lispro (ADMELOG) corrective regimen sliding scale   SubCutaneous every 6 hours  insulin NPH human recombinant 12 Unit(s) SubCutaneous every 6 hours  metoprolol tartrate 12.5 milliGRAM(s) Oral two times a day  metroNIDAZOLE  IVPB      metroNIDAZOLE  IVPB 500 milliGRAM(s) IV Intermittent every 8 hours  multivitamin 1 Tablet(s) Oral daily  pantoprazole  Injectable 40 milliGRAM(s) IV Push daily  sodium chloride 3%  Inhalation 3 milliLiter(s) Inhalation every 6 hours  vancomycin    Solution 125 milliGRAM(s) Oral three times a day    MEDICATIONS  (PRN):  acetaminophen    Suspension .. 650 milliGRAM(s) Enteral Tube every 6 hours PRN Temp greater or equal to 38C (100.4F), Mild Pain (1 - 3)      LABS:                        8.8    11.52 )-----------( 424      ( 04 Jan 2022 07:25 )             28.3     Hgb Trend: 8.8<--, 8.0<--, 7.9<--, 6.4<--  01-04    135  |  94<L>  |  22  ----------------------------<  167<H>  4.9   |  28  |  0.55    Ca    8.8      04 Jan 2022 07:23      Creatinine Trend: 0.55<--, 0.54<--, 0.55<--, 0.61<--, 0.75<--, 0.67<--            MICROBIOLOGY:     Culture - Sputum (collected 04 Jan 2022 22:10)  Source: .Sputum Sputum  Gram Stain (04 Jan 2022 23:21):    Numerous polymorphonuclear leukocytes per low power field    Numerous Squamous epithelial cells per low power field    Numerous Gram Positive Rods per oil power field    Few Gram Positive Cocci in Pairs and Chains per oil power field    Few Gram Variable Rods per oil power field    Results consistent with oropharyngeal contamination        RADIOLOGY:  [x ] Reviewed and interpreted by me    PULMONARY FUNCTION TESTS:    EKG:

## 2022-01-05 NOTE — CONSULT NOTE ADULT - ASSESSMENT
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 Diff colitis. Patient now with likely recurrent aspiration PNA vs trachiitis as well as small pneumothorax on chest imaging.    - Supplemental O2 as needed  - No intervention currently needed for pneumothorax continue to monitor  - Abx as per ID  - Head of bed elevation  - Aspiration precautions  - Patient without current need for RCU

## 2022-01-05 NOTE — PROGRESS NOTE ADULT - SUBJECTIVE AND OBJECTIVE BOX
[facility] Division of Hospital Medicine  Philippe Carr DO  Pager (KIMBERLY, 3U-5U): [phone]  Other Times:  [phone]    Patient is a 61y old  Male who presents with a chief complaint of IVH/SAH (03 Jan 2022 13:20)      SUBJECTIVE / OVERNIGHT EVENTS:  ADDITIONAL REVIEW OF SYSTEMS:    MEDICATIONS  (STANDING):  acetylcysteine 20%  Inhalation 4 milliLiter(s) Inhalation every 6 hours  albuterol/ipratropium for Nebulization 3 milliLiter(s) Nebulizer every 6 hours  apixaban 5 milliGRAM(s) Enteral Tube every 12 hours  artificial  tears Solution 1 Drop(s) Both EYES every 6 hours  aspirin  chewable 81 milliGRAM(s) Oral daily  atorvastatin 40 milliGRAM(s) Oral at bedtime  cefepime   IVPB      cefepime   IVPB 2000 milliGRAM(s) IV Intermittent every 12 hours  chlorhexidine 0.12% Liquid 15 milliLiter(s) Oral Mucosa two times a day  dextrose 50% Injectable 25 Gram(s) IV Push once  dextrose 50% Injectable 12.5 Gram(s) IV Push once  doxazosin 2 milliGRAM(s) Oral daily  insulin lispro (ADMELOG) corrective regimen sliding scale   SubCutaneous every 6 hours  insulin NPH human recombinant 12 Unit(s) SubCutaneous every 6 hours  metoprolol tartrate 12.5 milliGRAM(s) Oral two times a day  metroNIDAZOLE  IVPB      metroNIDAZOLE  IVPB 500 milliGRAM(s) IV Intermittent every 8 hours  multivitamin 1 Tablet(s) Oral daily  pantoprazole  Injectable 40 milliGRAM(s) IV Push daily  sodium chloride 3%  Inhalation 3 milliLiter(s) Inhalation every 6 hours  vancomycin    Solution 125 milliGRAM(s) Oral three times a day    MEDICATIONS  (PRN):  acetaminophen    Suspension .. 650 milliGRAM(s) Enteral Tube every 6 hours PRN Temp greater or equal to 38C (100.4F), Mild Pain (1 - 3)      CAPILLARY BLOOD GLUCOSE      POCT Blood Glucose.: 75 mg/dL (05 Jan 2022 05:27)  POCT Blood Glucose.: 107 mg/dL (05 Jan 2022 00:14)  POCT Blood Glucose.: 182 mg/dL (04 Jan 2022 16:36)  POCT Blood Glucose.: 185 mg/dL (04 Jan 2022 11:58)    I&O's Summary    04 Jan 2022 07:01  -  05 Jan 2022 07:00  --------------------------------------------------------  IN: 1150 mL / OUT: 0 mL / NET: 1150 mL        PHYSICAL EXAM:  Vital Signs Last 24 Hrs  T(C): 36.8 (05 Jan 2022 07:58), Max: 37.7 (04 Jan 2022 11:29)  T(F): 98.3 (05 Jan 2022 07:58), Max: 99.9 (04 Jan 2022 11:29)  HR: 92 (05 Jan 2022 07:58) (92 - 120)  BP: 125/74 (05 Jan 2022 07:58) (108/69 - 131/87)  BP(mean): --  RR: 18 (05 Jan 2022 07:58) (18 - 19)  SpO2: 99% (05 Jan 2022 07:58) (97% - 100%)  CONSTITUTIONAL: NAD, well-developed, well-groomed  EYES: PERRLA; conjunctiva and sclera clear  ENMT: Moist oral mucosa, no pharyngeal injection or exudates; normal dentition  NECK: Supple, no palpable masses; no thyromegaly  RESPIRATORY: Normal respiratory effort; lungs are clear to auscultation bilaterally  CARDIOVASCULAR: Regular rate and rhythm, normal S1 and S2, no murmur/rub/gallop; No lower extremity edema; Peripheral pulses are 2+ bilaterally  ABDOMEN: Nontender to palpation, normoactive bowel sounds, no rebound/guarding; No hepatosplenomegaly  MUSCULOSKELETAL:  Normal gait; no clubbing or cyanosis of digits; no joint swelling or tenderness to palpation  PSYCH: A+O to person, place, and time; affect appropriate  NEUROLOGY: CN 2-12 are intact and symmetric; no gross sensory deficits   SKIN: No rashes; no palpable lesions    LABS:                        8.8    11.52 )-----------( 424      ( 04 Jan 2022 07:25 )             28.3     01-04    135  |  94<L>  |  22  ----------------------------<  167<H>  4.9   |  28  |  0.55    Ca    8.8      04 Jan 2022 07:23                Culture - Sputum (collected 04 Jan 2022 22:10)  Source: .Sputum Sputum  Gram Stain (04 Jan 2022 23:21):    Numerous polymorphonuclear leukocytes per low power field    Numerous Squamous epithelial cells per low power field    Numerous Gram Positive Rods per oil power field    Few Gram Positive Cocci in Pairs and Chains per oil power field    Few Gram Variable Rods per oil power field    Results consistent with oropharyngeal contamination        RADIOLOGY & ADDITIONAL TESTS:  Results Reviewed:   Imaging Personally Reviewed:  Electrocardiogram Personally Reviewed:    COORDINATION OF CARE:  Care Discussed with Consultants/Other Providers [Y/N]:  Prior or Outpatient Records Reviewed [Y/N]:   [facility] Division of Hospital Medicine  Philippe Carr DO  Pager (KIMBERLY, 1A-1Q): [phone]  Other Times:  [phone]    Patient is a 61y old  Male who presents with a chief complaint of IVH/SAH (03 Jan 2022 13:20)    SUBJECTIVE / OVERNIGHT EVENTS: No acute events overnight. Patient transferred from neurosurgery service to medicine service. Patient unable to participate in review of systems due to clinical status.    ADDITIONAL REVIEW OF SYSTEMS: Patient unable to participate in review of systems due to clinical status.    MEDICATIONS  (STANDING):  acetylcysteine 20%  Inhalation 4 milliLiter(s) Inhalation every 6 hours  albuterol/ipratropium for Nebulization 3 milliLiter(s) Nebulizer every 6 hours  apixaban 5 milliGRAM(s) Enteral Tube every 12 hours  artificial  tears Solution 1 Drop(s) Both EYES every 6 hours  aspirin  chewable 81 milliGRAM(s) Oral daily  atorvastatin 40 milliGRAM(s) Oral at bedtime  cefepime   IVPB      cefepime   IVPB 2000 milliGRAM(s) IV Intermittent every 12 hours  chlorhexidine 0.12% Liquid 15 milliLiter(s) Oral Mucosa two times a day  dextrose 50% Injectable 25 Gram(s) IV Push once  dextrose 50% Injectable 12.5 Gram(s) IV Push once  doxazosin 2 milliGRAM(s) Oral daily  insulin lispro (ADMELOG) corrective regimen sliding scale   SubCutaneous every 6 hours  insulin NPH human recombinant 12 Unit(s) SubCutaneous every 6 hours  metoprolol tartrate 12.5 milliGRAM(s) Oral two times a day  metroNIDAZOLE  IVPB      metroNIDAZOLE  IVPB 500 milliGRAM(s) IV Intermittent every 8 hours  multivitamin 1 Tablet(s) Oral daily  pantoprazole  Injectable 40 milliGRAM(s) IV Push daily  sodium chloride 3%  Inhalation 3 milliLiter(s) Inhalation every 6 hours  vancomycin    Solution 125 milliGRAM(s) Oral three times a day    MEDICATIONS  (PRN):  acetaminophen    Suspension .. 650 milliGRAM(s) Enteral Tube every 6 hours PRN Temp greater or equal to 38C (100.4F), Mild Pain (1 - 3)      CAPILLARY BLOOD GLUCOSE      POCT Blood Glucose.: 75 mg/dL (05 Jan 2022 05:27)  POCT Blood Glucose.: 107 mg/dL (05 Jan 2022 00:14)  POCT Blood Glucose.: 182 mg/dL (04 Jan 2022 16:36)  POCT Blood Glucose.: 185 mg/dL (04 Jan 2022 11:58)    I&O's Summary    04 Jan 2022 07:01  -  05 Jan 2022 07:00  --------------------------------------------------------  IN: 1150 mL / OUT: 0 mL / NET: 1150 mL        PHYSICAL EXAM:  Vital Signs Last 24 Hrs  T(C): 36.8 (05 Jan 2022 07:58), Max: 37.7 (04 Jan 2022 11:29)  T(F): 98.3 (05 Jan 2022 07:58), Max: 99.9 (04 Jan 2022 11:29)  HR: 92 (05 Jan 2022 07:58) (92 - 120)  BP: 125/74 (05 Jan 2022 07:58) (108/69 - 131/87)  BP(mean): --  RR: 18 (05 Jan 2022 07:58) (18 - 19)  SpO2: 99% (05 Jan 2022 07:58) (97% - 100%)    CONSTITUTIONAL: Chronically ill appearing NAD, well-developed, well-groomed  EYES: PERRLA; conjunctiva and sclera clear  ENMT: Moist oral mucosa, no pharyngeal injection or exudates; normal dentition  NECK: Supple, no palpable masses; no thyromegaly  RESPIRATORY: Normal respiratory effort; lungs are clear to auscultation bilaterally  CARDIOVASCULAR: Regular rate and rhythm, normal S1 and S2, no murmur/rub/gallop; No lower extremity edema; Peripheral pulses are 2+ bilaterally  ABDOMEN: Nontender to palpation, normoactive bowel sounds, no rebound/guarding; No hepatosplenomegaly  MUSCULOSKELETAL:  Normal gait; no clubbing or cyanosis of digits; no joint swelling or tenderness to palpation  PSYCH: A+O to person, place, and time; affect appropriate  NEUROLOGY: CN 2-12 are intact and symmetric; no gross sensory deficits   SKIN: No rashes; no palpable lesions    LABS:                        8.8    11.52 )-----------( 424      ( 04 Jan 2022 07:25 )             28.3     01-04    135  |  94<L>  |  22  ----------------------------<  167<H>  4.9   |  28  |  0.55    Ca    8.8      04 Jan 2022 07:23                Culture - Sputum (collected 04 Jan 2022 22:10)  Source: .Sputum Sputum  Gram Stain (04 Jan 2022 23:21):    Numerous polymorphonuclear leukocytes per low power field    Numerous Squamous epithelial cells per low power field    Numerous Gram Positive Rods per oil power field    Few Gram Positive Cocci in Pairs and Chains per oil power field    Few Gram Variable Rods per oil power field    Results consistent with oropharyngeal contamination        RADIOLOGY & ADDITIONAL TESTS:  Results Reviewed:   Imaging Personally Reviewed:  Electrocardiogram Personally Reviewed:    COORDINATION OF CARE:  Care Discussed with Consultants/Other Providers [Y/N]:  Prior or Outpatient Records Reviewed [Y/N]:   [facility] Division of Hospital Medicine  Philippe Carr DO  Pager (KIMBERLY, 7W-9C): [phone]  Other Times:  [phone]    Patient is a 61y old  Male who presents with a chief complaint of IVH/SAH (03 Jan 2022 13:20)    SUBJECTIVE / OVERNIGHT EVENTS: No acute events overnight. Patient transferred from neurosurgery service to medicine service. Patient unable to participate in review of systems due to clinical status.    ADDITIONAL REVIEW OF SYSTEMS: Patient unable to participate in review of systems due to clinical status.    MEDICATIONS  (STANDING):  acetylcysteine 20%  Inhalation 4 milliLiter(s) Inhalation every 6 hours  albuterol/ipratropium for Nebulization 3 milliLiter(s) Nebulizer every 6 hours  apixaban 5 milliGRAM(s) Enteral Tube every 12 hours  artificial  tears Solution 1 Drop(s) Both EYES every 6 hours  aspirin  chewable 81 milliGRAM(s) Oral daily  atorvastatin 40 milliGRAM(s) Oral at bedtime  cefepime   IVPB      cefepime   IVPB 2000 milliGRAM(s) IV Intermittent every 12 hours  chlorhexidine 0.12% Liquid 15 milliLiter(s) Oral Mucosa two times a day  dextrose 50% Injectable 25 Gram(s) IV Push once  dextrose 50% Injectable 12.5 Gram(s) IV Push once  doxazosin 2 milliGRAM(s) Oral daily  insulin lispro (ADMELOG) corrective regimen sliding scale   SubCutaneous every 6 hours  insulin NPH human recombinant 12 Unit(s) SubCutaneous every 6 hours  metoprolol tartrate 12.5 milliGRAM(s) Oral two times a day  metroNIDAZOLE  IVPB      metroNIDAZOLE  IVPB 500 milliGRAM(s) IV Intermittent every 8 hours  multivitamin 1 Tablet(s) Oral daily  pantoprazole  Injectable 40 milliGRAM(s) IV Push daily  sodium chloride 3%  Inhalation 3 milliLiter(s) Inhalation every 6 hours  vancomycin    Solution 125 milliGRAM(s) Oral three times a day    MEDICATIONS  (PRN):  acetaminophen    Suspension .. 650 milliGRAM(s) Enteral Tube every 6 hours PRN Temp greater or equal to 38C (100.4F), Mild Pain (1 - 3)      CAPILLARY BLOOD GLUCOSE      POCT Blood Glucose.: 75 mg/dL (05 Jan 2022 05:27)  POCT Blood Glucose.: 107 mg/dL (05 Jan 2022 00:14)  POCT Blood Glucose.: 182 mg/dL (04 Jan 2022 16:36)  POCT Blood Glucose.: 185 mg/dL (04 Jan 2022 11:58)    I&O's Summary    04 Jan 2022 07:01  -  05 Jan 2022 07:00  --------------------------------------------------------  IN: 1150 mL / OUT: 0 mL / NET: 1150 mL        PHYSICAL EXAM:  Vital Signs Last 24 Hrs  T(C): 36.8 (05 Jan 2022 07:58), Max: 37.7 (04 Jan 2022 11:29)  T(F): 98.3 (05 Jan 2022 07:58), Max: 99.9 (04 Jan 2022 11:29)  HR: 92 (05 Jan 2022 07:58) (92 - 120)  BP: 125/74 (05 Jan 2022 07:58) (108/69 - 131/87)  BP(mean): --  RR: 18 (05 Jan 2022 07:58) (18 - 19)  SpO2: 99% (05 Jan 2022 07:58) (97% - 100%)    CONSTITUTIONAL: Chronically ill appearing male laying in bed in NAD  EYES: Closed, not opening to verbal or tactile stimuli  NECK: +Trach  RESPIRATORY: Normal respiratory effort; rhonchi bilaterally  CARDIOVASCULAR: Regular rate and rhythm, normal S1 and S2, no murmur/rub/gallop; No lower extremity edema  ABDOMEN: Nontender to palpation, normoactive bowel sounds, no rebound/guarding, +PEG  MUSCULOSKELETAL: No clubbing or cyanosis of digits; no joint swelling or tenderness to palpation  PSYCH: Not awakening to verbal stimuli, only withdrawing to pain occasionally  NEUROLOGY: Does not follow commands   SKIN: No rashes; no palpable lesions    LABS:                        8.8    11.52 )-----------( 424      ( 04 Jan 2022 07:25 )             28.3     01-04    135  |  94<L>  |  22  ----------------------------<  167<H>  4.9   |  28  |  0.55    Ca    8.8      04 Jan 2022 07:23                Culture - Sputum (collected 04 Jan 2022 22:10)  Source: .Sputum Sputum  Gram Stain (04 Jan 2022 23:21):    Numerous polymorphonuclear leukocytes per low power field    Numerous Squamous epithelial cells per low power field    Numerous Gram Positive Rods per oil power field    Few Gram Positive Cocci in Pairs and Chains per oil power field    Few Gram Variable Rods per oil power field    Results consistent with oropharyngeal contamination

## 2022-01-05 NOTE — CONSULT NOTE ADULT - CONSULT REASON
Code stroke
fever
small L pneumothorax
tracheostomy
Enteral nutrition
RCU Consult
Recurrent aspirations
b/l below the knee DVTs
fever
sacral/bilateral buttocks skin breakdown
 shunt
bladder tumor
goals of care

## 2022-01-05 NOTE — PROGRESS NOTE ADULT - ASSESSMENT
61 year old man with respiratory failure d/t ICH    1. ICH  -per neurosurgery  -s/p  shunt  -MRI done, as per neuro    2. Respiratory failure  -for tracheostomy, will require long term enteral nutrition  -s/p PEG, continue TF and use of abdominal binder  - aspiration precautions     3. Enterobacter PNA  -s/p course of antibiotics     4. Anemia, no overt GI bleed. EGD unremarkable.   -hgb stable  -monitor for GI bleeding     5. Cdiff infection   -on vanco taper  -continue banatrol in feeds  -50cc loose stools rectal tube drainage bag, improving    6. DVT on a/c  -apixaban 5mg resumed  -continue PPI     7. Pneumothorax  -per CTS    9. palliative  -family not interested in discussing palliative care at this time     Attending supervision statement: I have personally seen and examined the patient. I fully participated in the care of this patient. I have made amendments to the documentation where necessary, and agree with the history, physical exam, and plan as outlined by the ACP.    Attending supervision statement: I have personally seen and examined the patient. I fully participated in the care of this patient. I have made amendments to the documentation where necessary, and agree with the history, physical exam, and plan as outlined by the ACP.        [facility]  Gastroenterology and Hepatology  [address] [place], [place]  Office: [phone]  Cell: [phone]

## 2022-01-05 NOTE — PROGRESS NOTE ADULT - ASSESSMENT
60 yo male with PMH of HTN   Admitted on 11/4 with severe headache and found to have cerebellar bleed.  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  S/p trach and peg.   He has distal DVT's - on apixaban.  He received cefepime 11/4-11/21 for respiratory coverage.  Developed watery C diff diarrhea on 11/26 - started on oral vancomycin   Then restarted on cefepime on 11/28 for concerns of pneumonia since had low grade temps. Giorgio neb added later  But CXR's remained clear.  Ct of brain showed residual blood.  Pseudomonas in sputum found to be resistant to carbapenems & quinolones. Sputum isolated strep pneumoniae as well    11/28 urine and blood cultures are negative.  Cefepime completed on 12/06 & Giorgio nebs on 12/07/21  Failed TOV & teixeira was replaced for retention of 1000 ml of urine on 12/06/21 PM  Spiked a fever to 102F ? in response to retention  UA with 3 WBCs, no nit or LE, large blood   CXR with clear lungs  CTH revealed a Pseudomeningocele, s/p tap with gram stain without organism  CSF finalized as no growth  Blood & urine cx also finalized as no growth  Completed treatment for C diff w 2 wks of oral vanco on 12/10/21  Diarrhea resolved  He developed recurrent fever and relapsed with C diff  Vanco restarted on 12/22  He has a rectal tube in place.  At high risk for aspiration and  infections given history of retention and poor mental status.  His fecal output has decreased  He still requires ISC  CXR 12/31 with small left PTX  pyuria acceptable with ISC, he now has a teixeira  CXR read as possible KATHERINE  infiltrate  Low grade fever yesterday  but is otherwise clinically stable  E coli in urine can be viewed as coonizer  vitals reviewed the patient is afebrile   wbc was 11K yesterday     Plan   complete two weeks of vancomycin tx, now on week 1 of taper  125 tid x 1 week, 125 BID x 1 week, 125 daily x 1 week, and 125 every other day x 2 weeks  continue supportive care as per primary team   day 2 of 5 Cefepime and Flagyl concern for aspiration pneumonia, ordered a Cbc for AM labs

## 2022-01-05 NOTE — PROGRESS NOTE ADULT - PROBLEM SELECTOR PLAN 11
CT a/p with a 2.4 cm nodular soft tissue density in the bladder appears separate from the prostate gland, concerning for bladder lesion  - as per urology - findings concerning for bladder tumor vs prostate gland. cysto transurethral resection would require general anesthesia. recommended checking urine cytology to determine next steps which returned negative. will still need outpatient urology follow-up regardless once acute issues improve  - PSA normal, urine cytology negative for malignancy  - previously spoke to the patient's wife, Sandrita Bauer [phone]. Given the patient's underlying comorbid conditions coupled with his recent devastating neurologic events, other medical complications that ensued, and his poor neurologic and functional status at this time, it may be prudent to monitor his progress and see if he makes any meaningful recovery over the coming weeks to months to decide whether the benefit of pursuing any invasive work up for the bladder lesion outweighs the risk. Spouse seems to be in agreement.  - Will require eventual outpatient f/u with Urology

## 2022-01-05 NOTE — PROGRESS NOTE ADULT - PROBLEM SELECTOR PLAN 1
Per ID- monitor low grade temp conservatively. If fever escalates, check blood/urine/sputum cx  - Ucx 12/30 with + ecoli thought to be a colonizer  - CXR 1/2 with increased left lower lobe opacity  - CT chest result noted: complete b/l lower lobe atelectasis and findings concerning for possible aspiration/infection  - patient with copious secretions, episode of hypoxia improved after suctioning  - would have low threshold for resuming IV abx (zosyn vs cefepime) given above findings especially if he has recurrent fever and clinically decompensates.  - ID following - Ucx 12/30 with + ecoli thought to be a colonizer  - CXR 1/2 with increased left lower lobe opacity  - CT chest result noted: complete b/l lower lobe atelectasis and findings concerning for possible aspiration/infection  - patient with copious secretions, episode of hypoxia improved after suctioning  - Restarted IV antibiotics with cefepime and flagyl for possible aspiration pneumonia  - ID following

## 2022-01-05 NOTE — PROGRESS NOTE ADULT - PROBLEM SELECTOR PLAN 5
- trach dislodged a couple of times requiring adjustment by ENT. CT chest with high riding trach tube. ENT follow up  - continue trach care and suctioning  - monitor O2 sats  - continue nebs. change to xopenex if tachycardia persists but overall improved  - small pneumothorax appears to be stable- appreciate thoracic surgery input  - chest PT if able  - consider pulm/RCU consult for advanced respiratory care management including trach/secretions, etc - trach dislodged a couple of times requiring adjustment by ENT. CT chest with high riding trach tube. ENT follow up  - continue trach care and suctioning  - monitor O2 sats  - continue nebs. change to xopenex if tachycardia persists but overall improved  - small pneumothorax appears to be stable- appreciate thoracic surgery input  - chest PT if able  - Pulm consulted, not a RCU candidate at this time

## 2022-01-05 NOTE — PROGRESS NOTE ADULT - SUBJECTIVE AND OBJECTIVE BOX
Chief Complaint:  Patient is a 61y old  Male who presents with a chief complaint of Posterior fossa hemorrhage (05 Jan 2022 08:53)      Date of service 01-05-22 @ 11:28      Interval Events:   Patient seen and examined. Amount of loose stools decreasing.     Hospital Medications:  acetaminophen    Suspension .. 650 milliGRAM(s) Enteral Tube every 6 hours PRN  acetylcysteine 20%  Inhalation 4 milliLiter(s) Inhalation every 6 hours  albuterol/ipratropium for Nebulization 3 milliLiter(s) Nebulizer every 6 hours  apixaban 5 milliGRAM(s) Enteral Tube every 12 hours  artificial  tears Solution 1 Drop(s) Both EYES every 6 hours  aspirin  chewable 81 milliGRAM(s) Oral daily  atorvastatin 40 milliGRAM(s) Oral at bedtime  cefepime   IVPB      cefepime   IVPB 2000 milliGRAM(s) IV Intermittent every 12 hours  chlorhexidine 0.12% Liquid 15 milliLiter(s) Oral Mucosa two times a day  dextrose 50% Injectable 25 Gram(s) IV Push once  dextrose 50% Injectable 12.5 Gram(s) IV Push once  doxazosin 2 milliGRAM(s) Oral daily  insulin lispro (ADMELOG) corrective regimen sliding scale   SubCutaneous every 6 hours  insulin NPH human recombinant 12 Unit(s) SubCutaneous every 6 hours  metoprolol tartrate 12.5 milliGRAM(s) Oral two times a day  metroNIDAZOLE  IVPB      metroNIDAZOLE  IVPB 500 milliGRAM(s) IV Intermittent every 8 hours  multivitamin 1 Tablet(s) Oral daily  pantoprazole  Injectable 40 milliGRAM(s) IV Push daily  sodium chloride 3%  Inhalation 3 milliLiter(s) Inhalation every 6 hours  vancomycin    Solution 125 milliGRAM(s) Oral three times a day        Review of Systems:  unable to obtain    PHYSICAL EXAM:   Vital Signs:  Vital Signs Last 24 Hrs  T(C): 36.8 (05 Jan 2022 07:58), Max: 37.7 (04 Jan 2022 11:29)  T(F): 98.3 (05 Jan 2022 07:58), Max: 99.9 (04 Jan 2022 11:29)  HR: 92 (05 Jan 2022 07:58) (92 - 120)  BP: 125/74 (05 Jan 2022 07:58) (108/69 - 131/87)  BP(mean): --  RR: 18 (05 Jan 2022 07:58) (18 - 19)  SpO2: 99% (05 Jan 2022 07:58) (97% - 100%)  Daily     Daily       PHYSICAL EXAM:     GENERAL:  Appears stated age, well-groomed, well-nourished, no distress  HEENT:  NC/AT,  conjunctivae anicteric, clear and pink,   NECK: supple, trachea midline  CHEST:  Full & symmetric excursion, no increased effort, breath sounds clear  HEART:  Regular rhythm, no JVD  ABDOMEN:  Soft, non-tender, non-distended, normoactive bowel sounds,  no masses , no hepatosplenomegaly, +peg  EXTREMITIES:  no cyanosis,clubbing or edema  SKIN:  No rash, erythema, or, ecchymoses, no jaundice  NEURO:  non-focal, no asterixis  RECTAL: Deferred      LABS Personally reviewed by me:                        8.8    11.52 )-----------( 424      ( 04 Jan 2022 07:25 )             28.3       01-04    135  |  94<L>  |  22  ----------------------------<  167<H>  4.9   |  28  |  0.55    Ca    8.8      04 Jan 2022 07:23                                    8.8    11.52 )-----------( 424      ( 04 Jan 2022 07:25 )             28.3       Imaging personally reviewed by me:

## 2022-01-05 NOTE — CONSULT NOTE ADULT - PROVIDER SPECIALTY LIST ADULT
Surgery
Vascular Cardiology
Wound Care
Neurology
Thoracic Surgery
Gastroenterology
Hospitalist
Pulmonology
Pulmonology
Infectious Disease
Urology
ENT
Palliative Care

## 2022-01-05 NOTE — CONSULT NOTE ADULT - CONSULT REQUESTED DATE/TIME
04-Nov-2021 10:10
05-Jan-2022 10:09
17-Dec-2021 16:12
26-Nov-2021 14:44
18-Nov-2021 22:38
22-Nov-2021 14:48
26-Nov-2021 13:13
29-Nov-2021 10:20
22-Nov-2021 16:33
23-Dec-2021 11:17
17-Nov-2021
31-Dec-2021 12:42
12-Nov-2021 15:12

## 2022-01-05 NOTE — PROGRESS NOTE ADULT - ASSESSMENT
[age]M with PMH of HTN, CAD s/p stent on DAPT, T2DM who complained of headache and subsequently became unresponsive found to have posterior fossa hemorrhage, IVH, hydrocephalus, s/p EVD and SOC on 11/4. 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 Course c/b respiratory failure and dysphagia s/p trach/PEG (11/19), VPS (11/23), completed course of cefepime for enterobacter and pseudomonas pneumonia on 11/21, fevers, c. diff colitis, urinary retention, b/l distal DVT. New fevers again on 12/22 with diarrhea found to have recurrent c. diff infection on PO vanco. Trach dislodged again on 12/31, replaced by ENT, c/b small left pneumothorax, recurrent fever.

## 2022-01-05 NOTE — PROGRESS NOTE ADULT - SUBJECTIVE AND OBJECTIVE BOX
CC: f/u for C Diff and fever    Patient reports: he appears comfortable, is non verbal    REVIEW OF SYSTEMS:  All other review of systems negative (Comprehensive ROS)    Antimicrobials Day #  :day 14/14  vancomycin    Solution 125 milliGRAM(s) Oral every 6 hours    Other Medications Reviewed    Vital Signs Last 24 Hrs  T(C): 36.8 (05 Jan 2022 12:07), Max: 36.8 (04 Jan 2022 23:26)  T(F): 98.2 (05 Jan 2022 12:07), Max: 98.3 (04 Jan 2022 23:26)  HR: 98 (05 Jan 2022 12:07) (92 - 108)  BP: 113/74 (05 Jan 2022 12:07) (113/74 - 131/87)  BP(mean): --  RR: 19 (05 Jan 2022 12:07) (18 - 19)  SpO2: 100% (05 Jan 2022 12:07) (97% - 100%)    PHYSICAL EXAM:  General: alert, no acute distress  Eyes:  anicteric, no conjunctival injection, no discharge  Oropharynx: no lesions or injection 	  Neck: supple, trach collar  Lungs: clear to auscultation  Heart: regular rate and rhythm; no murmur, rubs or gallops  Abdomen: soft, nondistended, nontender, without mass or organomegaly, peg  Skin: no lesions  Extremities: no clubbing, cyanosis, or edema  Neurologic: poorly interactive  : teixeira  rectal tube    LAB RESULTS:                                                         8.8    11.52 )-----------( 424      ( 04 Jan 2022 07:25 )             28.3           MICROBIOLOGY:  RECENT CULTURES:  12-30 @ 18:25 Catheterized Catheterized     >100,000 CFU/ml Escherichia coli          RADIOLOGY REVIEWED:  < from: Xray Chest 1 View- PORTABLE-Urgent (Xray Chest 1 View- PORTABLE-Urgent .) (01.01.22 @ 06:00) >  IMPRESSION: Small left pneumothorax, unchanged. Stable mild haziness in   the left upper lobe which may represent pneumonia.    --- End of Report     < end of copied text >

## 2022-01-05 NOTE — PROGRESS NOTE ADULT - PROBLEM SELECTOR PLAN 2
completed course of PO vanco previously.  developed recurrent fever and diarrhea. found to have recurrent c. diff infection.  - consider d/c rectal tube soon if the output is decreasing  - c/w PO vanco started on 12/22 with plan for 14 days of therapy and month long taper  - continue PO vanco qid today then slow taper, 125 tid x 1 week, 125 BID x 1 week, 125 daily x 1 week, and 125 every other day x 2 weeks  - judicious use of abx therapy as may exacerbate c. diff completed course of PO vanco previously.  developed recurrent fever and diarrhea. found to have recurrent c. diff infection.  - consider d/c rectal tube soon if the output is decreasing  - c/w PO vanco started on 12/22 with plan for 14 days of therapy and month long taper  - continue PO vanco slow taper, 125 tid x 1 week, 125 BID x 1 week, 125 daily x 1 week, and 125 every other day x 2 weeks  - judicious use of abx therapy as may exacerbate c. diff

## 2022-01-05 NOTE — CONSULT NOTE ADULT - CONSULT REQUESTED BY NAME
Neuro
RN
Dr. Andrew Page
ED
Neurosurgery
Dr. Mcgregor
Dr. Page
Dr. Rudd
Page
YODIT
NSICU
NSCU
Tab Anaya

## 2022-01-06 LAB
ANION GAP SERPL CALC-SCNC: 11 MMOL/L — SIGNIFICANT CHANGE UP (ref 5–17)
APPEARANCE UR: ABNORMAL
BACTERIA # UR AUTO: NEGATIVE — SIGNIFICANT CHANGE UP
BILIRUB UR-MCNC: NEGATIVE — SIGNIFICANT CHANGE UP
BUN SERPL-MCNC: 23 MG/DL — SIGNIFICANT CHANGE UP (ref 7–23)
CALCIUM SERPL-MCNC: 8.5 MG/DL — SIGNIFICANT CHANGE UP (ref 8.4–10.5)
CHLORIDE SERPL-SCNC: 97 MMOL/L — SIGNIFICANT CHANGE UP (ref 96–108)
CO2 SERPL-SCNC: 30 MMOL/L — SIGNIFICANT CHANGE UP (ref 22–31)
COLOR SPEC: YELLOW — SIGNIFICANT CHANGE UP
CREAT SERPL-MCNC: 0.58 MG/DL — SIGNIFICANT CHANGE UP (ref 0.5–1.3)
CULTURE RESULTS: SIGNIFICANT CHANGE UP
DIFF PNL FLD: ABNORMAL
EPI CELLS # UR: 1 /HPF — SIGNIFICANT CHANGE UP
GLUCOSE BLDC GLUCOMTR-MCNC: 115 MG/DL — HIGH (ref 70–99)
GLUCOSE BLDC GLUCOMTR-MCNC: 149 MG/DL — HIGH (ref 70–99)
GLUCOSE BLDC GLUCOMTR-MCNC: 153 MG/DL — HIGH (ref 70–99)
GLUCOSE BLDC GLUCOMTR-MCNC: 160 MG/DL — HIGH (ref 70–99)
GLUCOSE SERPL-MCNC: 145 MG/DL — HIGH (ref 70–99)
GLUCOSE UR QL: NEGATIVE — SIGNIFICANT CHANGE UP
HCT VFR BLD CALC: 25.6 % — LOW (ref 39–50)
HGB BLD-MCNC: 7.9 G/DL — LOW (ref 13–17)
HYALINE CASTS # UR AUTO: 2 /LPF — SIGNIFICANT CHANGE UP (ref 0–2)
KETONES UR-MCNC: NEGATIVE — SIGNIFICANT CHANGE UP
LACTATE SERPL-SCNC: 1.6 MMOL/L — SIGNIFICANT CHANGE UP (ref 0.7–2)
LEUKOCYTE ESTERASE UR-ACNC: ABNORMAL
MAGNESIUM SERPL-MCNC: 2.3 MG/DL — SIGNIFICANT CHANGE UP (ref 1.6–2.6)
MCHC RBC-ENTMCNC: 29.4 PG — SIGNIFICANT CHANGE UP (ref 27–34)
MCHC RBC-ENTMCNC: 30.9 GM/DL — LOW (ref 32–36)
MCV RBC AUTO: 95.2 FL — SIGNIFICANT CHANGE UP (ref 80–100)
NITRITE UR-MCNC: NEGATIVE — SIGNIFICANT CHANGE UP
NRBC # BLD: 0 /100 WBCS — SIGNIFICANT CHANGE UP (ref 0–0)
PH UR: 6 — SIGNIFICANT CHANGE UP (ref 5–8)
PHOSPHATE SERPL-MCNC: 3.8 MG/DL — SIGNIFICANT CHANGE UP (ref 2.5–4.5)
PLATELET # BLD AUTO: 356 K/UL — SIGNIFICANT CHANGE UP (ref 150–400)
POTASSIUM SERPL-MCNC: 4.5 MMOL/L — SIGNIFICANT CHANGE UP (ref 3.5–5.3)
POTASSIUM SERPL-SCNC: 4.5 MMOL/L — SIGNIFICANT CHANGE UP (ref 3.5–5.3)
PROT UR-MCNC: ABNORMAL
RBC # BLD: 2.69 M/UL — LOW (ref 4.2–5.8)
RBC # FLD: 13.3 % — SIGNIFICANT CHANGE UP (ref 10.3–14.5)
RBC CASTS # UR COMP ASSIST: 178 /HPF — HIGH (ref 0–4)
SODIUM SERPL-SCNC: 138 MMOL/L — SIGNIFICANT CHANGE UP (ref 135–145)
SP GR SPEC: 1.02 — SIGNIFICANT CHANGE UP (ref 1.01–1.02)
SPECIMEN SOURCE: SIGNIFICANT CHANGE UP
UROBILINOGEN FLD QL: NEGATIVE — SIGNIFICANT CHANGE UP
WBC # BLD: 10.83 K/UL — HIGH (ref 3.8–10.5)
WBC # FLD AUTO: 10.83 K/UL — HIGH (ref 3.8–10.5)
WBC UR QL: 20 /HPF — HIGH (ref 0–5)

## 2022-01-06 PROCEDURE — 99233 SBSQ HOSP IP/OBS HIGH 50: CPT

## 2022-01-06 RX ORDER — METRONIDAZOLE 500 MG
500 TABLET ORAL THREE TIMES A DAY
Refills: 0 | Status: DISCONTINUED | OUTPATIENT
Start: 2022-01-06 | End: 2022-01-08

## 2022-01-06 RX ADMIN — SODIUM CHLORIDE 3 MILLILITER(S): 9 INJECTION INTRAMUSCULAR; INTRAVENOUS; SUBCUTANEOUS at 18:08

## 2022-01-06 RX ADMIN — SODIUM CHLORIDE 3 MILLILITER(S): 9 INJECTION INTRAMUSCULAR; INTRAVENOUS; SUBCUTANEOUS at 05:59

## 2022-01-06 RX ADMIN — Medication 4 MILLILITER(S): at 18:07

## 2022-01-06 RX ADMIN — Medication 1 DROP(S): at 18:08

## 2022-01-06 RX ADMIN — Medication 1 DROP(S): at 12:17

## 2022-01-06 RX ADMIN — Medication 125 MILLIGRAM(S): at 05:58

## 2022-01-06 RX ADMIN — CHLORHEXIDINE GLUCONATE 15 MILLILITER(S): 213 SOLUTION TOPICAL at 18:28

## 2022-01-06 RX ADMIN — Medication 1 DROP(S): at 23:34

## 2022-01-06 RX ADMIN — ATORVASTATIN CALCIUM 40 MILLIGRAM(S): 80 TABLET, FILM COATED ORAL at 21:29

## 2022-01-06 RX ADMIN — SODIUM CHLORIDE 3 MILLILITER(S): 9 INJECTION INTRAMUSCULAR; INTRAVENOUS; SUBCUTANEOUS at 23:34

## 2022-01-06 RX ADMIN — Medication 650 MILLIGRAM(S): at 13:02

## 2022-01-06 RX ADMIN — Medication 3 MILLILITER(S): at 12:16

## 2022-01-06 RX ADMIN — APIXABAN 5 MILLIGRAM(S): 2.5 TABLET, FILM COATED ORAL at 18:28

## 2022-01-06 RX ADMIN — Medication 125 MILLIGRAM(S): at 21:30

## 2022-01-06 RX ADMIN — Medication 1 DROP(S): at 05:59

## 2022-01-06 RX ADMIN — Medication 4 MILLILITER(S): at 12:16

## 2022-01-06 RX ADMIN — Medication 650 MILLIGRAM(S): at 12:26

## 2022-01-06 RX ADMIN — Medication 81 MILLIGRAM(S): at 15:00

## 2022-01-06 RX ADMIN — PANTOPRAZOLE SODIUM 40 MILLIGRAM(S): 20 TABLET, DELAYED RELEASE ORAL at 12:20

## 2022-01-06 RX ADMIN — HUMAN INSULIN 12 UNIT(S): 100 INJECTION, SUSPENSION SUBCUTANEOUS at 12:18

## 2022-01-06 RX ADMIN — Medication 100 MILLIGRAM(S): at 06:32

## 2022-01-06 RX ADMIN — Medication 500 MILLIGRAM(S): at 21:29

## 2022-01-06 RX ADMIN — HUMAN INSULIN 12 UNIT(S): 100 INJECTION, SUSPENSION SUBCUTANEOUS at 23:32

## 2022-01-06 RX ADMIN — Medication 2: at 05:57

## 2022-01-06 RX ADMIN — CHLORHEXIDINE GLUCONATE 15 MILLILITER(S): 213 SOLUTION TOPICAL at 05:56

## 2022-01-06 RX ADMIN — HUMAN INSULIN 12 UNIT(S): 100 INJECTION, SUSPENSION SUBCUTANEOUS at 05:59

## 2022-01-06 RX ADMIN — Medication 2 MILLIGRAM(S): at 05:56

## 2022-01-06 RX ADMIN — Medication 125 MILLIGRAM(S): at 15:00

## 2022-01-06 RX ADMIN — Medication 4 MILLILITER(S): at 06:26

## 2022-01-06 RX ADMIN — CEFEPIME 100 MILLIGRAM(S): 1 INJECTION, POWDER, FOR SOLUTION INTRAMUSCULAR; INTRAVENOUS at 17:57

## 2022-01-06 RX ADMIN — CEFEPIME 100 MILLIGRAM(S): 1 INJECTION, POWDER, FOR SOLUTION INTRAMUSCULAR; INTRAVENOUS at 05:54

## 2022-01-06 RX ADMIN — Medication 3 MILLILITER(S): at 23:33

## 2022-01-06 RX ADMIN — Medication 3 MILLILITER(S): at 05:57

## 2022-01-06 RX ADMIN — Medication 12.5 MILLIGRAM(S): at 18:27

## 2022-01-06 RX ADMIN — APIXABAN 5 MILLIGRAM(S): 2.5 TABLET, FILM COATED ORAL at 05:56

## 2022-01-06 RX ADMIN — Medication 2: at 23:33

## 2022-01-06 RX ADMIN — Medication 12.5 MILLIGRAM(S): at 05:56

## 2022-01-06 RX ADMIN — Medication 1 TABLET(S): at 12:19

## 2022-01-06 RX ADMIN — HUMAN INSULIN 12 UNIT(S): 100 INJECTION, SUSPENSION SUBCUTANEOUS at 18:45

## 2022-01-06 RX ADMIN — Medication 4 MILLILITER(S): at 23:33

## 2022-01-06 RX ADMIN — Medication 3 MILLILITER(S): at 18:07

## 2022-01-06 RX ADMIN — Medication 500 MILLIGRAM(S): at 15:00

## 2022-01-06 NOTE — PROGRESS NOTE ADULT - ASSESSMENT
62 yo male with PMH of HTN   Admitted on 11/4 with severe headache and found to have cerebellar bleed.  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  S/p trach and peg.   He has distal DVT's - on apixaban.  He received cefepime 11/4-11/21 for respiratory coverage.  Developed watery C diff diarrhea on 11/26 - started on oral vancomycin   Then restarted on cefepime on 11/28 for concerns of pneumonia since had low grade temps. Giorgio neb added later  But CXR's remained clear.  Ct of brain showed residual blood.  Pseudomonas in sputum found to be resistant to carbapenems & quinolones. Sputum isolated strep pneumoniae as well    11/28 urine and blood cultures are negative.  Cefepime completed on 12/06 & Giorgio nebs on 12/07/21  Failed TOV & teixeira was replaced for retention of 1000 ml of urine on 12/06/21 PM  Spiked a fever to 102F ? in response to retention  UA with 3 WBCs, no nit or LE, large blood   CXR with clear lungs  CTH revealed a Pseudomeningocele, s/p tap with gram stain without organism  CSF finalized as no growth  Blood & urine cx also finalized as no growth  Completed treatment for C diff w 2 wks of oral vanco on 12/10/21  Diarrhea resolved  He developed recurrent fever and relapsed with C diff  Vanco restarted on 12/22  He has a rectal tube in place.  At high risk for aspiration and  infections given history of retention and poor mental status.  His fecal output has decreased  He still requires ISC  CXR 12/31 with small left PTX  pyuria acceptable with ISC, he now has a teixeira  CXR read as possible KATHERINE  infiltrate  Low grade fevers continue  but is otherwise clinically stable  E coli in urine can be viewed as coonizer, he requires Q6 ISC  Despite 3 days of cefepime and metronidazole for concerns of pneumonia he remains febrile.  Plan   complete two weeks of vancomycin tx, now on week 1 of taper  125 tid x 1 week, 125 BID x 1 week, 125 daily x 1 week, and 125 every other day x 2 weeks  day 3 cefepime and metronidazole, ? 5 days total-sputum with MURF  He appears to be failing medical therapy  Neurological status poor  Goals of care will be important

## 2022-01-06 NOTE — PROGRESS NOTE ADULT - PROBLEM SELECTOR PLAN 1
- Ucx 12/30 with + ecoli thought to be a colonizer  - CXR 1/2 with increased left lower lobe opacity  - CT chest result noted: complete b/l lower lobe atelectasis and findings concerning for possible aspiration/infection  - patient with copious secretions, episode of hypoxia improved after suctioning  - Restarted IV antibiotics with cefepime and flagyl for possible aspiration pneumonia  - ID following  -Also had low grade temp of 100.3 on 1/6, fever workup initiated and Tylenol given, already on antibiotics, will follow up results of workup.

## 2022-01-06 NOTE — PROGRESS NOTE ADULT - PROBLEM SELECTOR PLAN 5
- trach dislodged a couple of times requiring adjustment by ENT. CT chest with high riding trach tube. ENT follow up  - continue trach care and suctioning  - monitor O2 sats  - continue nebs. change to xopenex if tachycardia persists but overall improved  - small pneumothorax appears to be stable- appreciate thoracic surgery input  - chest PT if able  - Pulm consulted, not a RCU candidate at this time

## 2022-01-06 NOTE — PROGRESS NOTE ADULT - PROBLEM SELECTOR PLAN 11
CT a/p with a 2.4 cm nodular soft tissue density in the bladder appears separate from the prostate gland, concerning for bladder lesion  - as per urology - findings concerning for bladder tumor vs prostate gland. cysto transurethral resection would require general anesthesia. recommended checking urine cytology to determine next steps which returned negative. will still need outpatient urology follow-up regardless once acute issues improve  - PSA normal, urine cytology negative for malignancy  - previously spoke to the patient's wife, Sandrita Bauer [phone]. Given the patient's underlying comorbid conditions coupled with his recent devastating neurologic events, other medical complications that ensued, and his poor neurologic and functional status at this time, it may be prudent to monitor his progress and see if he makes any meaningful recovery over the coming weeks to months to decide whether the benefit of pursuing any invasive work up for the bladder lesion outweighs the risk. Spouse seems to be in agreement.  - Will require eventual outpatient f/u with Urology

## 2022-01-06 NOTE — PROGRESS NOTE ADULT - SUBJECTIVE AND OBJECTIVE BOX
[facility] Division of Hospital Medicine  Philippe Carr DO  Pager (KIMBERLY, [phone]-[phone]): [phone]  Other Times:  [phone]    Patient is a 61y old  Male who presents with a chief complaint of ICH (2022 13:54)    SUBJECTIVE / OVERNIGHT EVENTS: No acute events overnight. Patient seen and examined at bedside this morning, patient unable to participate in review of systems due to clinical status.    ADDITIONAL REVIEW OF SYSTEMS: Patient unable to participate in review of systems due to clinical status.    MEDICATIONS  (STANDING):  acetylcysteine 20%  Inhalation 4 milliLiter(s) Inhalation every 6 hours  albuterol/ipratropium for Nebulization 3 milliLiter(s) Nebulizer every 6 hours  apixaban 5 milliGRAM(s) Enteral Tube every 12 hours  artificial  tears Solution 1 Drop(s) Both EYES every 6 hours  aspirin  chewable 81 milliGRAM(s) Oral daily  atorvastatin 40 milliGRAM(s) Oral at bedtime  cefepime   IVPB      cefepime   IVPB 2000 milliGRAM(s) IV Intermittent every 12 hours  chlorhexidine 0.12% Liquid 15 milliLiter(s) Oral Mucosa two times a day  dextrose 50% Injectable 25 Gram(s) IV Push once  dextrose 50% Injectable 12.5 Gram(s) IV Push once  doxazosin 2 milliGRAM(s) Oral daily  insulin lispro (ADMELOG) corrective regimen sliding scale   SubCutaneous every 6 hours  insulin NPH human recombinant 12 Unit(s) SubCutaneous every 6 hours  metoprolol tartrate 12.5 milliGRAM(s) Oral two times a day  metroNIDAZOLE    Tablet 500 milliGRAM(s) Oral three times a day  multivitamin 1 Tablet(s) Oral daily  pantoprazole  Injectable 40 milliGRAM(s) IV Push daily  sodium chloride 3%  Inhalation 3 milliLiter(s) Inhalation every 6 hours  vancomycin    Solution 125 milliGRAM(s) Oral three times a day    MEDICATIONS  (PRN):  acetaminophen    Suspension .. 650 milliGRAM(s) Enteral Tube every 6 hours PRN Temp greater or equal to 38C (100.4F), Mild Pain (1 - 3)      CAPILLARY BLOOD GLUCOSE      POCT Blood Glucose.: 149 mg/dL (2022 12:14)  POCT Blood Glucose.: 153 mg/dL (2022 05:52)  POCT Blood Glucose.: 158 mg/dL (2022 23:33)  POCT Blood Glucose.: 182 mg/dL (2022 17:01)    I&O's Summary    2022 07:01  -  2022 07:00  --------------------------------------------------------  IN: 900 mL / OUT: 1600 mL / NET: -700 mL        PHYSICAL EXAM:  Vital Signs Last 24 Hrs  T(C): 37.9 (2022 11:15), Max: 37.9 (2022 11:15)  T(F): 100.3 (2022 11:15), Max: 100.3 (2022 11:15)  HR: 90 (:15) (87 - 100)  BP: 119/74 (2022 11:15) (105/63 - 124/69)  BP(mean): --  RR: 18 (2022 11:15) (18 - 18)  SpO2: 99% (2022 11:15) (98% - 100%)    CONSTITUTIONAL: Chronically ill appearing male laying in bed in NAD  EYES: Closed, not opening to verbal or tactile stimuli  NECK: +Trach  RESPIRATORY: Normal respiratory effort; rhonchi bilaterally  CARDIOVASCULAR: Regular rate and rhythm, normal S1 and S2, no murmur/rub/gallop; No lower extremity edema  ABDOMEN: Nontender to palpation, normoactive bowel sounds, no rebound/guarding, +PEG  MUSCULOSKELETAL: No clubbing or cyanosis of digits; no joint swelling or tenderness to palpation  PSYCH: Not awakening to verbal stimuli, not withdrawing to pain  NEUROLOGY: Does not follow commands   SKIN: No rashes; no palpable lesions    LABS:                        7.9    10.83 )-----------( 356      ( 2022 06:46 )             25.6     01-06    138  |  97  |  23  ----------------------------<  145<H>  4.5   |  30  |  0.58    Ca    8.5      2022 06:45  Phos  3.8     01-06  Mg     2.3     -06            Urinalysis Basic - ( 2022 13:23 )    Color: Yellow / Appearance: Slightly Turbid / S.022 / pH: x  Gluc: x / Ketone: Negative  / Bili: Negative / Urobili: Negative   Blood: x / Protein: 30 mg/dL / Nitrite: Negative   Leuk Esterase: Moderate / RBC: 178 /hpf / WBC 20 /HPF   Sq Epi: x / Non Sq Epi: 1 /hpf / Bacteria: Negative        Culture - Sputum (collected 2022 22:10)  Source: .Sputum Sputum  Gram Stain (2022 23:21):    Numerous polymorphonuclear leukocytes per low power field    Numerous Squamous epithelial cells per low power field    Numerous Gram Positive Rods per oil power field    Few Gram Positive Cocci in Pairs and Chains per oil power field    Few Gram Variable Rods per oil power field    Results consistent with oropharyngeal contamination  Preliminary Report (2022 17:00):    Normal Respiratory Susan present

## 2022-01-06 NOTE — PROGRESS NOTE ADULT - ASSESSMENT
61 year old man with respiratory failure d/t ICH    1. ICH  -per neurosurgery  -s/p  shunt  -MRI done, as per neuro    2. Respiratory failure  -for tracheostomy, will require long term enteral nutrition  -s/p PEG, continue TF and use of abdominal binder  - aspiration precautions     3. Enterobacter PNA  -s/p course of antibiotics     4. Anemia, no overt GI bleed. EGD unremarkable.   -hgb stable  -monitor for GI bleeding     5. Cdiff infection   -on vanco taper  -continue banatrol in feeds  -~50cc loose stools rectal tube drainage bag, improving    6. DVT on a/c  -apixaban 5mg resumed  -continue PPI     7. Pneumothorax  -per CTS    9. palliative  -family not interested in discussing palliative care at this time     Attending supervision statement: I have personally seen and examined the patient. I fully participated in the care of this patient. I have made amendments to the documentation where necessary, and agree with the history, physical exam, and plan as outlined by the ACP.    Attending supervision statement: I have personally seen and examined the patient. I fully participated in the care of this patient. I have made amendments to the documentation where necessary, and agree with the history, physical exam, and plan as outlined by the ACP.        [facility]  Gastroenterology and Hepatology  [address] [place], [place]  Office: [phone]  Cell: [phone]

## 2022-01-06 NOTE — PROGRESS NOTE ADULT - SUBJECTIVE AND OBJECTIVE BOX
CC: f/u for C Diff, fver, and aspiration pneumonia    Patient reports: he is non verbal on TC    REVIEW OF SYSTEMS:  All other review of systems negative (Comprehensive ROS): less mdiarrhea    Antimicrobials Day #  :day 3 antibiotics, week one of taper  cefepime   IVPB      cefepime   IVPB 2000 milliGRAM(s) IV Intermittent every 12 hours  metroNIDAZOLE  IVPB      metroNIDAZOLE  IVPB 500 milliGRAM(s) IV Intermittent every 8 hours  vancomycin    Solution 125 milliGRAM(s) Oral three times a day    Other Medications Reviewed    T(F): 100.3 (22 @ 11:15), Max: 100.3 (22 @ 11:15)  HR: 90 (22 @ 11:15)  BP: 119/74 (22 @ 11:15)  RR: 18 (22 @ 11:15)  SpO2: 99% (22 @ 11:15)  Wt(kg): --    PHYSICAL EXAM:  General: lethargic, no acute distress  Eyes:  anicteric, no conjunctival injection, no discharge  Oropharynx: no lesions or injection 	  Neck: supple, trach  Lungs: scattered ronchi  Heart: regular rate and rhythm; no murmur, rubs or gallops  Abdomen: soft, nondistended, nontender, without mass or organomegaly, peg  Skin: no lesions  Extremities: no clubbing, cyanosis,+ edema  Neurologic: poorly interactive  rectal tube in place    LAB RESULTS:                        7.9    10.83 )-----------( 356      ( 2022 06:46 )             25.6         138  |  97  |  23  ----------------------------<  145<H>  4.5   |  30  |  0.58    Ca    8.5      2022 06:45  Phos  3.8       Mg     2.3             Urinalysis Basic - ( 2022 13:23 )    Color: Yellow / Appearance: Slightly Turbid / S.022 / pH: x  Gluc: x / Ketone: Negative  / Bili: Negative / Urobili: Negative   Blood: x / Protein: 30 mg/dL / Nitrite: Negative   Leuk Esterase: Moderate / RBC: 178 /hpf / WBC 20 /HPF   Sq Epi: x / Non Sq Epi: 1 /hpf / Bacteria: Negative      MICROBIOLOGY:  RECENT CULTURES:   @ 22:10 .Sputum Sputum     Normal Respiratory Susan present    Numerous polymorphonuclear leukocytes per low power field  Numerous Squamous epithelial cells per low power field  Numerous Gram Positive Rods per oil power field  Few Gram Positive Cocci in Pairs and Chains per oil power field  Few Gram Variable Rods per oil power field  Results consistent with oropharyngeal contamination        RADIOLOGY REVIEWED:    < from: Xray Chest 1 View- PORTABLE-Urgent (Xray Chest 1 View- PORTABLE-Urgent .) (22 @ 07:22) >  IMPRESSION:    Bilateral opacities in the lower lungs which are improved from one day   prior.    --- End of Report ---    < end of copied text >  < from: CT Chest No Cont (22 @ 22:34) >  IMPRESSION:  Complete bilateral lower lobe atelectasis; superimposed aspiration cannot   be excluded on this noncontrast CT. Additional centrilobular nodules in   the left upper lobe are concerning for aspiration/infection.    Small left and trace right pleural effusions. New trace anterior left   pneumothorax.    High-riding tracheostomy tube; consider advancing.    --- End of Report ---    < end of copied text >

## 2022-01-06 NOTE — PROGRESS NOTE ADULT - PROBLEM SELECTOR PLAN 2
completed course of PO vanco previously.  developed recurrent fever and diarrhea. found to have recurrent c. diff infection.  - consider d/c rectal tube soon if the output is decreasing  - c/w PO vanco started on 12/22 with plan for 14 days of therapy and month long taper  - continue PO vanco slow taper, 125 tid x 1 week, 125 BID x 1 week, 125 daily x 1 week, and 125 every other day x 2 weeks  - judicious use of abx therapy as may exacerbate c. diff

## 2022-01-06 NOTE — PROGRESS NOTE ADULT - SUBJECTIVE AND OBJECTIVE BOX
Chief Complaint:  Patient is a 61y old  Male who presents with a chief complaint of Posterior fossa hemorrhage (05 Jan 2022 08:53)      Date of service 01-06-22 @ 10:55      Interval Events:   Patient seen and examined. Stool output decreasing.     Hospital Medications:  acetaminophen    Suspension .. 650 milliGRAM(s) Enteral Tube every 6 hours PRN  acetylcysteine 20%  Inhalation 4 milliLiter(s) Inhalation every 6 hours  albuterol/ipratropium for Nebulization 3 milliLiter(s) Nebulizer every 6 hours  apixaban 5 milliGRAM(s) Enteral Tube every 12 hours  artificial  tears Solution 1 Drop(s) Both EYES every 6 hours  aspirin  chewable 81 milliGRAM(s) Oral daily  atorvastatin 40 milliGRAM(s) Oral at bedtime  cefepime   IVPB      cefepime   IVPB 2000 milliGRAM(s) IV Intermittent every 12 hours  chlorhexidine 0.12% Liquid 15 milliLiter(s) Oral Mucosa two times a day  dextrose 50% Injectable 25 Gram(s) IV Push once  dextrose 50% Injectable 12.5 Gram(s) IV Push once  doxazosin 2 milliGRAM(s) Oral daily  insulin lispro (ADMELOG) corrective regimen sliding scale   SubCutaneous every 6 hours  insulin NPH human recombinant 12 Unit(s) SubCutaneous every 6 hours  metoprolol tartrate 12.5 milliGRAM(s) Oral two times a day  metroNIDAZOLE  IVPB      metroNIDAZOLE  IVPB 500 milliGRAM(s) IV Intermittent every 8 hours  multivitamin 1 Tablet(s) Oral daily  pantoprazole  Injectable 40 milliGRAM(s) IV Push daily  sodium chloride 3%  Inhalation 3 milliLiter(s) Inhalation every 6 hours  vancomycin    Solution 125 milliGRAM(s) Oral three times a day        Review of Systems:  unable to obtain    PHYSICAL EXAM:   Vital Signs:  Vital Signs Last 24 Hrs  T(C): 36.5 (06 Jan 2022 08:15), Max: 36.8 (05 Jan 2022 12:07)  T(F): 97.7 (06 Jan 2022 08:15), Max: 98.3 (05 Jan 2022 23:27)  HR: 87 (06 Jan 2022 08:15) (87 - 100)  BP: 116/72 (06 Jan 2022 08:15) (105/63 - 124/69)  BP(mean): --  RR: 18 (06 Jan 2022 08:15) (18 - 19)  SpO2: 99% (06 Jan 2022 08:15) (98% - 100%)  Daily     Daily       PHYSICAL EXAM:     GENERAL:  Appears stated age, well-groomed, well-nourished, no distress  HEENT:  NC/AT,  conjunctivae anicteric, clear and pink,   NECK: supple, trachea midline  CHEST:  Full & symmetric excursion, no increased effort, breath sounds clear  HEART:  Regular rhythm, no JVD  ABDOMEN:  Soft, non-tender, non-distended, normoactive bowel sounds,  no masses , no hepatosplenomegaly, +peg  EXTREMITIES:  no cyanosis,clubbing or edema  SKIN:  No rash, erythema, or, ecchymoses, no jaundice  NEURO:  Alert, non-focal, no asterixis  PSYCH: Appropriate affect, oriented to place and time  RECTAL: Deferred      LABS Personally reviewed by me:                        7.9    10.83 )-----------( 356      ( 06 Jan 2022 06:46 )             25.6     Mean Cell Volume: 95.2 fl (01-06-22 @ 06:46)    01-06    138  |  97  |  23  ----------------------------<  145<H>  4.5   |  30  |  0.58    Ca    8.5      06 Jan 2022 06:45  Phos  3.8     01-06  Mg     2.3     01-06                                    7.9    10.83 )-----------( 356      ( 06 Jan 2022 06:46 )             25.6                         8.8    11.52 )-----------( 424      ( 04 Jan 2022 07:25 )             28.3       Imaging personally reviewed by me:

## 2022-01-06 NOTE — PHARMACOTHERAPY INTERVENTION NOTE - COMMENTS
Patient Doug Givens is a 62 y/o male with a prolonged hospital course, admitted originally on 11/4/21 for severe headache found to have cerebellar bleed. 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 Pt is being followed by ID for recurrent C.diff infections, now on taper schedule and further started on IV cefepime + flagyl for aspiration pneumonia. Today is day 3 of IV abx. Given national IV flagyl shortage, and patient's ability to receive other oral medications via PEG tube, reached out to ID attending Dr. Lock to convert IV flagyl to PO. MD is agreeable to change.    Recommendations:  > discontinue IV flagly, initiate Flagyl  mg q8h    Thank you,    Franci Martell PharmD, PGY-1 Pharmacy Resident  Available on [employer] or [employer] [id number] Patient Doug Givens is a 60 y/o male with a prolonged hospital course, admitted originally on 11/4/21 for severe headache found to have cerebellar bleed. 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 Pt is being followed by ID for recurrent C.diff infections, now on taper schedule and further started on IV cefepime + flagyl for aspiration pneumonia. Today is day 3 of IV abx. Given national IV flagyl shortage, and patient's ability to receive other oral medications via PEG tube, reached out to ID attending Dr. Lock to convert IV flagyl to PO. MD is agreeable to change.    Recommendations:  > discontinue IV Flagyl, initiate Flagyl  mg q8h    Thank you,    Franci Martell PharmD, PGY-1 Pharmacy Resident  Available on [employer] or [employer] [phone]

## 2022-01-07 LAB
BLD GP AB SCN SERPL QL: NEGATIVE — SIGNIFICANT CHANGE UP
GLUCOSE BLDC GLUCOMTR-MCNC: 123 MG/DL — HIGH (ref 70–99)
GLUCOSE BLDC GLUCOMTR-MCNC: 151 MG/DL — HIGH (ref 70–99)
GLUCOSE BLDC GLUCOMTR-MCNC: 160 MG/DL — HIGH (ref 70–99)
HCT VFR BLD CALC: 26.1 % — LOW (ref 39–50)
HGB BLD-MCNC: 7.9 G/DL — LOW (ref 13–17)
MCHC RBC-ENTMCNC: 28.7 PG — SIGNIFICANT CHANGE UP (ref 27–34)
MCHC RBC-ENTMCNC: 30.3 GM/DL — LOW (ref 32–36)
MCV RBC AUTO: 94.9 FL — SIGNIFICANT CHANGE UP (ref 80–100)
NRBC # BLD: 0 /100 WBCS — SIGNIFICANT CHANGE UP (ref 0–0)
PLATELET # BLD AUTO: 376 K/UL — SIGNIFICANT CHANGE UP (ref 150–400)
RBC # BLD: 2.75 M/UL — LOW (ref 4.2–5.8)
RBC # FLD: 13.3 % — SIGNIFICANT CHANGE UP (ref 10.3–14.5)
RH IG SCN BLD-IMP: POSITIVE — SIGNIFICANT CHANGE UP
WBC # BLD: 8.73 K/UL — SIGNIFICANT CHANGE UP (ref 3.8–10.5)
WBC # FLD AUTO: 8.73 K/UL — SIGNIFICANT CHANGE UP (ref 3.8–10.5)

## 2022-01-07 PROCEDURE — 99233 SBSQ HOSP IP/OBS HIGH 50: CPT

## 2022-01-07 RX ADMIN — Medication 650 MILLIGRAM(S): at 22:35

## 2022-01-07 RX ADMIN — CEFEPIME 100 MILLIGRAM(S): 1 INJECTION, POWDER, FOR SOLUTION INTRAMUSCULAR; INTRAVENOUS at 17:47

## 2022-01-07 RX ADMIN — Medication 4 MILLILITER(S): at 05:44

## 2022-01-07 RX ADMIN — APIXABAN 5 MILLIGRAM(S): 2.5 TABLET, FILM COATED ORAL at 17:50

## 2022-01-07 RX ADMIN — Medication 12.5 MILLIGRAM(S): at 05:45

## 2022-01-07 RX ADMIN — HUMAN INSULIN 12 UNIT(S): 100 INJECTION, SUSPENSION SUBCUTANEOUS at 17:48

## 2022-01-07 RX ADMIN — Medication 4 MILLILITER(S): at 17:49

## 2022-01-07 RX ADMIN — SODIUM CHLORIDE 3 MILLILITER(S): 9 INJECTION INTRAMUSCULAR; INTRAVENOUS; SUBCUTANEOUS at 05:46

## 2022-01-07 RX ADMIN — Medication 3 MILLILITER(S): at 12:22

## 2022-01-07 RX ADMIN — CHLORHEXIDINE GLUCONATE 15 MILLILITER(S): 213 SOLUTION TOPICAL at 17:47

## 2022-01-07 RX ADMIN — HUMAN INSULIN 12 UNIT(S): 100 INJECTION, SUSPENSION SUBCUTANEOUS at 12:22

## 2022-01-07 RX ADMIN — Medication 1 TABLET(S): at 12:23

## 2022-01-07 RX ADMIN — ATORVASTATIN CALCIUM 40 MILLIGRAM(S): 80 TABLET, FILM COATED ORAL at 22:05

## 2022-01-07 RX ADMIN — CEFEPIME 100 MILLIGRAM(S): 1 INJECTION, POWDER, FOR SOLUTION INTRAMUSCULAR; INTRAVENOUS at 05:46

## 2022-01-07 RX ADMIN — Medication 81 MILLIGRAM(S): at 12:23

## 2022-01-07 RX ADMIN — SODIUM CHLORIDE 3 MILLILITER(S): 9 INJECTION INTRAMUSCULAR; INTRAVENOUS; SUBCUTANEOUS at 12:23

## 2022-01-07 RX ADMIN — CHLORHEXIDINE GLUCONATE 15 MILLILITER(S): 213 SOLUTION TOPICAL at 05:51

## 2022-01-07 RX ADMIN — Medication 500 MILLIGRAM(S): at 05:45

## 2022-01-07 RX ADMIN — Medication 1 DROP(S): at 05:47

## 2022-01-07 RX ADMIN — PANTOPRAZOLE SODIUM 40 MILLIGRAM(S): 20 TABLET, DELAYED RELEASE ORAL at 12:23

## 2022-01-07 RX ADMIN — Medication 3 MILLILITER(S): at 17:49

## 2022-01-07 RX ADMIN — Medication 2 MILLIGRAM(S): at 05:45

## 2022-01-07 RX ADMIN — Medication 2: at 17:48

## 2022-01-07 RX ADMIN — Medication 125 MILLIGRAM(S): at 22:05

## 2022-01-07 RX ADMIN — Medication 12.5 MILLIGRAM(S): at 17:49

## 2022-01-07 RX ADMIN — Medication 650 MILLIGRAM(S): at 22:05

## 2022-01-07 RX ADMIN — Medication 1 DROP(S): at 17:48

## 2022-01-07 RX ADMIN — Medication 4 MILLILITER(S): at 12:22

## 2022-01-07 RX ADMIN — Medication 2: at 12:20

## 2022-01-07 RX ADMIN — Medication 500 MILLIGRAM(S): at 14:06

## 2022-01-07 RX ADMIN — SODIUM CHLORIDE 3 MILLILITER(S): 9 INJECTION INTRAMUSCULAR; INTRAVENOUS; SUBCUTANEOUS at 17:50

## 2022-01-07 RX ADMIN — Medication 500 MILLIGRAM(S): at 22:05

## 2022-01-07 RX ADMIN — APIXABAN 5 MILLIGRAM(S): 2.5 TABLET, FILM COATED ORAL at 05:46

## 2022-01-07 RX ADMIN — Medication 125 MILLIGRAM(S): at 05:45

## 2022-01-07 RX ADMIN — Medication 125 MILLIGRAM(S): at 14:06

## 2022-01-07 RX ADMIN — HUMAN INSULIN 12 UNIT(S): 100 INJECTION, SUSPENSION SUBCUTANEOUS at 05:50

## 2022-01-07 RX ADMIN — Medication 1 DROP(S): at 12:20

## 2022-01-07 RX ADMIN — Medication 3 MILLILITER(S): at 05:52

## 2022-01-07 NOTE — PROGRESS NOTE ADULT - SUBJECTIVE AND OBJECTIVE BOX
Chief Complaint:  Patient is a 61y old  Male who presents with a chief complaint of Posterior fossa hemorrhage (2022 15:12)      Date of service 22 @ 12:24      Interval Events:   Patient seen and examined. Spiked temp 100.3 last night. Stool output decreasing.     Hospital Medications:  acetaminophen    Suspension .. 650 milliGRAM(s) Enteral Tube every 6 hours PRN  acetylcysteine 20%  Inhalation 4 milliLiter(s) Inhalation every 6 hours  albuterol/ipratropium for Nebulization 3 milliLiter(s) Nebulizer every 6 hours  apixaban 5 milliGRAM(s) Enteral Tube every 12 hours  artificial  tears Solution 1 Drop(s) Both EYES every 6 hours  aspirin  chewable 81 milliGRAM(s) Oral daily  atorvastatin 40 milliGRAM(s) Oral at bedtime  cefepime   IVPB      cefepime   IVPB 2000 milliGRAM(s) IV Intermittent every 12 hours  chlorhexidine 0.12% Liquid 15 milliLiter(s) Oral Mucosa two times a day  dextrose 50% Injectable 25 Gram(s) IV Push once  dextrose 50% Injectable 12.5 Gram(s) IV Push once  doxazosin 2 milliGRAM(s) Oral daily  insulin lispro (ADMELOG) corrective regimen sliding scale   SubCutaneous every 6 hours  insulin NPH human recombinant 12 Unit(s) SubCutaneous every 6 hours  metoprolol tartrate 12.5 milliGRAM(s) Oral two times a day  metroNIDAZOLE    Tablet 500 milliGRAM(s) Oral three times a day  multivitamin 1 Tablet(s) Oral daily  pantoprazole  Injectable 40 milliGRAM(s) IV Push daily  sodium chloride 3%  Inhalation 3 milliLiter(s) Inhalation every 6 hours  vancomycin    Solution 125 milliGRAM(s) Oral three times a day        Review of Systems:  unable to obtain    PHYSICAL EXAM:   Vital Signs:  Vital Signs Last 24 Hrs  T(C): 36.8 (2022 09:48), Max: 37.6 (2022 13:30)  T(F): 98.2 (2022 09:48), Max: 99.6 (2022 13:30)  HR: 89 (2022 09:48) (86 - 101)  BP: 107/61 (2022 09:48) (107/61 - 130/78)  BP(mean): --  RR: 19 (2022 09:48) (18 - 19)  SpO2: 100% (2022 09:48) (98% - 100%)  Daily     Daily       PHYSICAL EXAM:     GENERAL:  Appears stated age, well-groomed, well-nourished, no distress  HEENT:  NC/AT,  conjunctivae anicteric, clear and pink,   NECK: supple, trachea midline, +Trach  CHEST:  Full & symmetric excursion, no increased effort, breath sounds clear  HEART:  Regular rhythm, no JVD  ABDOMEN:  Soft, non-tender, non-distended, normoactive bowel sounds,  no masses , no hepatosplenomegaly, +peg  EXTREMITIES:  no cyanosis,clubbing or edema  SKIN:  No rash, erythema, or, ecchymoses, no jaundice  NEURO:  non-focal, no asterixis  RECTAL: Deferred      LABS Personally reviewed by me:                        7.9    8.73  )-----------( 376      ( 2022 06:59 )             26.1     Mean Cell Volume: 94.9 fl (22 @ 06:59)        138  |  97  |  23  ----------------------------<  145<H>  4.5   |  30  |  0.58    Ca    8.5      2022 06:45  Phos  3.8       Mg     2.3               Urinalysis Basic - ( 2022 13:23 )    Color: Yellow / Appearance: Slightly Turbid / S.022 / pH: x  Gluc: x / Ketone: Negative  / Bili: Negative / Urobili: Negative   Blood: x / Protein: 30 mg/dL / Nitrite: Negative   Leuk Esterase: Moderate / RBC: 178 /hpf / WBC 20 /HPF   Sq Epi: x / Non Sq Epi: 1 /hpf / Bacteria: Negative                              7.9    8.73  )-----------( 376      ( 2022 06:59 )             26.1                         7.9    10.83 )-----------( 356      ( 2022 06:46 )             25.6       Imaging personally reviewed by me:

## 2022-01-07 NOTE — PROGRESS NOTE ADULT - ASSESSMENT
61 year old man with respiratory failure d/t ICH    1. ICH  -per neurosurgery  -s/p  shunt  -MRI done, as per neuro    2. Respiratory failure  -for tracheostomy, will require long term enteral nutrition  -s/p PEG, continue TF and use of abdominal binder  - aspiration precautions     3. Enterobacter PNA  -s/p course of antibiotics     4. Anemia, no overt GI bleed. EGD unremarkable.   -hgb stable  -monitor for GI bleeding     5. Cdiff infection   -on vanco taper  -continue banatrol in feeds  -~50cc loose stools rectal tube drainage bag, improving  -consider dc of rectal tube    6. DVT on a/c  -apixaban 5mg resumed  -continue PPI     7. Pneumothorax  -per CTS    9. palliative  -family not interested in discussing palliative care at this time     Attending supervision statement: I have personally seen and examined the patient. I fully participated in the care of this patient. I have made amendments to the documentation where necessary, and agree with the history, physical exam, and plan as outlined by the ACP.    Attending supervision statement: I have personally seen and examined the patient. I fully participated in the care of this patient. I have made amendments to the documentation where necessary, and agree with the history, physical exam, and plan as outlined by the ACP.        [facility]  Gastroenterology and Hepatology  [address], [place]  Office: [phone]  Cell: [phone]

## 2022-01-07 NOTE — PROGRESS NOTE ADULT - PROBLEM SELECTOR PLAN 11
CT a/p with a 2.4 cm nodular soft tissue density in the bladder appears separate from the prostate gland, concerning for bladder lesion  - as per urology - findings concerning for bladder tumor vs prostate gland. cysto transurethral resection would require general anesthesia. recommended checking urine cytology to determine next steps which returned negative. will still need outpatient urology follow-up regardless once acute issues improve  - PSA normal, urine cytology negative for malignancy  - previously spoke to the patient's wife, Sandrita Bauer [phone]. Given the patient's underlying comorbid conditions coupled with his recent devastating neurologic events, other medical complications that ensued, and his poor neurologic and functional status at this time, it may be prudent to monitor his progress and see if he makes any meaningful recovery over the coming weeks to months to decide whether the benefit of pursuing any invasive work up for the bladder lesion outweighs the risk. Spouse seems to be in agreement.  - Will require eventual outpatient f/u with Urology

## 2022-01-07 NOTE — PROGRESS NOTE ADULT - ASSESSMENT
62 yo male with PMH of HTN   Admitted on 11/4 with severe headache and found to have cerebellar bleed.  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  S/p trach and peg.   He has distal DVT's - on apixaban.  He received cefepime 11/4-11/21 for respiratory coverage.  Developed watery C diff diarrhea on 11/26 - started on oral vancomycin   Then restarted on cefepime on 11/28 for concerns of pneumonia since had low grade temps. Giorgio neb added later  But CXR's remained clear.  Ct of brain showed residual blood.  Pseudomonas in sputum found to be resistant to carbapenems & quinolones. Sputum isolated strep pneumoniae as well    11/28 urine and blood cultures are negative.  Cefepime completed on 12/06 & Giorgio nebs on 12/07/21  Failed TOV & teixeira was replaced for retention of 1000 ml of urine on 12/06/21 PM  Spiked a fever to 102F ? in response to retention  UA with 3 WBCs, no nit or LE, large blood   CXR with clear lungs  CTH revealed a Pseudomeningocele, s/p tap with gram stain without organism  CSF finalized as no growth  Blood & urine cx also finalized as no growth  Completed treatment for C diff w 2 wks of oral vanco on 12/10/21  Diarrhea resolved  He developed recurrent fever and relapsed with C diff  Vanco restarted on 12/22  He has a rectal tube in place.  At high risk for aspiration and  infections given history of retention and poor mental status.  His fecal output has decreased  He still requires ISC  CXR 12/31 with small left PTX  pyuria acceptable with ISC, he now has a teixeira  CXR read as possible KATHERINE  infiltrate  Low grade fevers continue  but is otherwise clinically stable  E coli in urine can be viewed as coonizer, he requires Q6 ISC  vitals reviewed today he is currently afebrile   complete two weeks po vancomycin now on taper     Plan   continue  on week 1 of taper  125 tid x 1 week, 125 BID x 1 week, 125 daily x 1 week, and 125 every other day x 2 weeks  day 4  cefepime and metronidazole to complete a five day course for aspiration  continue supportive care per medicine

## 2022-01-07 NOTE — PROGRESS NOTE ADULT - SUBJECTIVE AND OBJECTIVE BOX
CC: f/u for C Diff, fver, and aspiration pneumonia    Patient reports: he is non verbal on TC    REVIEW OF SYSTEMS:  All other review of systems negative (Comprehensive ROS): less mdiarrhea    Antimicrobials Day #  :day 4 antibiotics, week one of taper  cefepime   IVPB      cefepime   IVPB 2000 milliGRAM(s) IV Intermittent every 12 hours  metroNIDAZOLE  IVPB      metroNIDAZOLE  IVPB 500 milliGRAM(s) IV Intermittent every 8 hours  vancomycin    Solution 125 milliGRAM(s) Oral three times a day    Other Medications Reviewed    Vital Signs Last 24 Hrs  T(C): 36.8 (2022 04:49), Max: 37.9 (2022 11:15)  T(F): 98.3 (2022 04:49), Max: 100.3 (2022 11:15)  HR: 101 (2022 04:49) (86 - 101)  BP: 126/74 (2022 04:49) (117/79 - 130/78)  BP(mean): --  RR: 18 (2022 04:49) (18 - 18)  SpO2: 100% (2022 04:49) (98% - 100%)    PHYSICAL EXAM:  General: lethargic, no acute distress  Eyes:  anicteric, no conjunctival injection, no discharge  Oropharynx: no lesions or injection 	  Neck: supple, trach  Lungs: scattered ronchi  Heart: regular rate and rhythm; no murmur, rubs or gallops  Abdomen: soft, nondistended, nontender, without mass or organomegaly, peg  Skin: no lesions  Extremities: no clubbing, cyanosis,+ edema  Neurologic: poorly interactive  rectal tube in place    LAB RESULTS:                                   7.9    8.73  )-----------( 376      ( 2022 06:59 )             26.1     01-06    138  |  97  |  23  ----------------------------<  145<H>  4.5   |  30  |  0.58    Ca    8.5      2022 06:45  Phos  3.8     01-06  Mg     2.3     01-06          Urinalysis Basic - ( 2022 13:23 )    Color: Yellow / Appearance: Slightly Turbid / S.022 / pH: x  Gluc: x / Ketone: Negative  / Bili: Negative / Urobili: Negative   Blood: x / Protein: 30 mg/dL / Nitrite: Negative   Leuk Esterase: Moderate / RBC: 178 /hpf / WBC 20 /HPF   Sq Epi: x / Non Sq Epi: 1 /hpf / Bacteria: Negative      MICROBIOLOGY:  RECENT CULTURES:   @ 22:10 .Sputum Sputum     Normal Respiratory Susan present    Numerous polymorphonuclear leukocytes per low power field  Numerous Squamous epithelial cells per low power field  Numerous Gram Positive Rods per oil power field  Few Gram Positive Cocci in Pairs and Chains per oil power field  Few Gram Variable Rods per oil power field  Results consistent with oropharyngeal contamination        RADIOLOGY REVIEWED:    < from: Xray Chest 1 View- PORTABLE-Urgent (Xray Chest 1 View- PORTABLE-Urgent .) (22 @ 07:22) >  IMPRESSION:    Bilateral opacities in the lower lungs which are improved from one day   prior.    --- End of Report ---    < end of copied text >  < from: CT Chest No Cont (22 @ 22:34) >  IMPRESSION:  Complete bilateral lower lobe atelectasis; superimposed aspiration cannot   be excluded on this noncontrast CT. Additional centrilobular nodules in   the left upper lobe are concerning for aspiration/infection.    Small left and trace right pleural effusions. New trace anterior left   pneumothorax.    High-riding tracheostomy tube; consider advancing.    --- End of Report ---    < end of copied text >

## 2022-01-07 NOTE — PROGRESS NOTE ADULT - PROBLEM SELECTOR PLAN 1
- Ucx 12/30 with + ecoli thought to be a colonizer  - CXR 1/2 with increased left lower lobe opacity  - CT chest result noted: complete b/l lower lobe atelectasis and findings concerning for possible aspiration/infection  - patient with copious secretions, episode of hypoxia improved after suctioning  - Restarted IV antibiotics with cefepime and flagyl for possible aspiration pneumonia  - ID following  -Also had low grade temp of 100.3 on 1/6, fever workup initiated and Tylenol given, already on antibiotics for aspiration pneumonia-follow up blood cultures

## 2022-01-07 NOTE — PROGRESS NOTE ADULT - SUBJECTIVE AND OBJECTIVE BOX
[facility] Division of Hospital Medicine  Philippe Carr DO  Pager (KIMBERLY, 5Q-6G): [phone]  Other Times:  [phone]    Patient is a 61y old  Male who presents with a chief complaint of Posterior fossa hemorrhage (2022 15:12)    SUBJECTIVE / OVERNIGHT EVENTS: No acute events overnight. Patient seen and examined at bedside this morning, patient unable to participate in review of systems due to clinical status.    ADDITIONAL REVIEW OF SYSTEMS: Patient unable to participate in review of systems due to clinical status.    MEDICATIONS  (STANDING):  acetylcysteine 20%  Inhalation 4 milliLiter(s) Inhalation every 6 hours  albuterol/ipratropium for Nebulization 3 milliLiter(s) Nebulizer every 6 hours  apixaban 5 milliGRAM(s) Enteral Tube every 12 hours  artificial  tears Solution 1 Drop(s) Both EYES every 6 hours  aspirin  chewable 81 milliGRAM(s) Oral daily  atorvastatin 40 milliGRAM(s) Oral at bedtime  cefepime   IVPB      cefepime   IVPB 2000 milliGRAM(s) IV Intermittent every 12 hours  chlorhexidine 0.12% Liquid 15 milliLiter(s) Oral Mucosa two times a day  dextrose 50% Injectable 25 Gram(s) IV Push once  dextrose 50% Injectable 12.5 Gram(s) IV Push once  doxazosin 2 milliGRAM(s) Oral daily  insulin lispro (ADMELOG) corrective regimen sliding scale   SubCutaneous every 6 hours  insulin NPH human recombinant 12 Unit(s) SubCutaneous every 6 hours  metoprolol tartrate 12.5 milliGRAM(s) Oral two times a day  metroNIDAZOLE    Tablet 500 milliGRAM(s) Oral three times a day  multivitamin 1 Tablet(s) Oral daily  pantoprazole  Injectable 40 milliGRAM(s) IV Push daily  sodium chloride 3%  Inhalation 3 milliLiter(s) Inhalation every 6 hours  vancomycin    Solution 125 milliGRAM(s) Oral three times a day    MEDICATIONS  (PRN):  acetaminophen    Suspension .. 650 milliGRAM(s) Enteral Tube every 6 hours PRN Temp greater or equal to 38C (100.4F), Mild Pain (1 - 3)      CAPILLARY BLOOD GLUCOSE      POCT Blood Glucose.: 151 mg/dL (2022 12:19)  POCT Blood Glucose.: 123 mg/dL (2022 05:47)  POCT Blood Glucose.: 160 mg/dL (2022 23:30)  POCT Blood Glucose.: 115 mg/dL (2022 17:52)    I&O's Summary    2022 07:01  -  2022 07:00  --------------------------------------------------------  IN: 900 mL / OUT: 1960 mL / NET: -1060 mL    2022 07:01  -  2022 12:55  --------------------------------------------------------  IN: 225 mL / OUT: 0 mL / NET: 225 mL        PHYSICAL EXAM:  Vital Signs Last 24 Hrs  T(C): 36.8 (2022 09:48), Max: 37.6 (2022 13:30)  T(F): 98.2 (2022 09:48), Max: 99.6 (2022 13:30)  HR: 89 (2022 09:48) (86 - 101)  BP: 107/61 (2022 09:48) (107/61 - 130/78)  BP(mean): --  RR: 19 (2022 09:48) (18 - 19)  SpO2: 100% (2022 09:48) (98% - 100%)    CONSTITUTIONAL: Chronically ill appearing male laying in bed in NAD  EYES: Closed, not opening to verbal or tactile stimuli  NECK: +Trach  RESPIRATORY: Normal respiratory effort; rhonchi bilaterally  CARDIOVASCULAR: Regular rate and rhythm, normal S1 and S2, no murmur/rub/gallop; No lower extremity edema  ABDOMEN: Nontender to palpation, normoactive bowel sounds, no rebound/guarding, +PEG  MUSCULOSKELETAL: No clubbing or cyanosis of digits; no joint swelling or tenderness to palpation  PSYCH: Not awakening to verbal stimuli, not withdrawing to pain  NEUROLOGY: Does not follow commands   SKIN: No rashes; no palpable lesions    LABS:                        7.9    8.73  )-----------( 376      ( 2022 06:59 )             26.1     01-06    138  |  97  |  23  ----------------------------<  145<H>  4.5   |  30  |  0.58    Ca    8.5      2022 06:45  Phos  3.8     -  Mg     2.3     -06            Urinalysis Basic - ( 2022 13:23 )    Color: Yellow / Appearance: Slightly Turbid / S.022 / pH: x  Gluc: x / Ketone: Negative  / Bili: Negative / Urobili: Negative   Blood: x / Protein: 30 mg/dL / Nitrite: Negative   Leuk Esterase: Moderate / RBC: 178 /hpf / WBC 20 /HPF   Sq Epi: x / Non Sq Epi: 1 /hpf / Bacteria: Negative        Culture - Sputum (collected 2022 22:10)  Source: .Sputum Sputum  Gram Stain (2022 23:21):    Numerous polymorphonuclear leukocytes per low power field    Numerous Squamous epithelial cells per low power field    Numerous Gram Positive Rods per oil power field    Few Gram Positive Cocci in Pairs and Chains per oil power field    Few Gram Variable Rods per oil power field    Results consistent with oropharyngeal contamination  Final Report (2022 22:24):    Normal Respiratory Susan present

## 2022-01-08 LAB
ANION GAP SERPL CALC-SCNC: 13 MMOL/L — SIGNIFICANT CHANGE UP (ref 5–17)
BASOPHILS # BLD AUTO: 0.03 K/UL — SIGNIFICANT CHANGE UP (ref 0–0.2)
BASOPHILS NFR BLD AUTO: 0.3 % — SIGNIFICANT CHANGE UP (ref 0–2)
BUN SERPL-MCNC: 25 MG/DL — HIGH (ref 7–23)
CALCIUM SERPL-MCNC: 9.1 MG/DL — SIGNIFICANT CHANGE UP (ref 8.4–10.5)
CHLORIDE SERPL-SCNC: 97 MMOL/L — SIGNIFICANT CHANGE UP (ref 96–108)
CO2 SERPL-SCNC: 27 MMOL/L — SIGNIFICANT CHANGE UP (ref 22–31)
CREAT SERPL-MCNC: 0.58 MG/DL — SIGNIFICANT CHANGE UP (ref 0.5–1.3)
EOSINOPHIL # BLD AUTO: 0.29 K/UL — SIGNIFICANT CHANGE UP (ref 0–0.5)
EOSINOPHIL NFR BLD AUTO: 2.9 % — SIGNIFICANT CHANGE UP (ref 0–6)
GLUCOSE BLDC GLUCOMTR-MCNC: 130 MG/DL — HIGH (ref 70–99)
GLUCOSE BLDC GLUCOMTR-MCNC: 136 MG/DL — HIGH (ref 70–99)
GLUCOSE BLDC GLUCOMTR-MCNC: 151 MG/DL — HIGH (ref 70–99)
GLUCOSE BLDC GLUCOMTR-MCNC: 152 MG/DL — HIGH (ref 70–99)
GLUCOSE BLDC GLUCOMTR-MCNC: 169 MG/DL — HIGH (ref 70–99)
GLUCOSE SERPL-MCNC: 129 MG/DL — HIGH (ref 70–99)
HCT VFR BLD CALC: 27.9 % — LOW (ref 39–50)
HGB BLD-MCNC: 8.5 G/DL — LOW (ref 13–17)
IMM GRANULOCYTES NFR BLD AUTO: 1.4 % — SIGNIFICANT CHANGE UP (ref 0–1.5)
LYMPHOCYTES # BLD AUTO: 1.91 K/UL — SIGNIFICANT CHANGE UP (ref 1–3.3)
LYMPHOCYTES # BLD AUTO: 18.9 % — SIGNIFICANT CHANGE UP (ref 13–44)
MCHC RBC-ENTMCNC: 28.9 PG — SIGNIFICANT CHANGE UP (ref 27–34)
MCHC RBC-ENTMCNC: 30.5 GM/DL — LOW (ref 32–36)
MCV RBC AUTO: 94.9 FL — SIGNIFICANT CHANGE UP (ref 80–100)
MONOCYTES # BLD AUTO: 0.67 K/UL — SIGNIFICANT CHANGE UP (ref 0–0.9)
MONOCYTES NFR BLD AUTO: 6.6 % — SIGNIFICANT CHANGE UP (ref 2–14)
NEUTROPHILS # BLD AUTO: 7.06 K/UL — SIGNIFICANT CHANGE UP (ref 1.8–7.4)
NEUTROPHILS NFR BLD AUTO: 69.9 % — SIGNIFICANT CHANGE UP (ref 43–77)
NRBC # BLD: 0 /100 WBCS — SIGNIFICANT CHANGE UP (ref 0–0)
PLATELET # BLD AUTO: 390 K/UL — SIGNIFICANT CHANGE UP (ref 150–400)
POTASSIUM SERPL-MCNC: 5.1 MMOL/L — SIGNIFICANT CHANGE UP (ref 3.5–5.3)
POTASSIUM SERPL-SCNC: 5.1 MMOL/L — SIGNIFICANT CHANGE UP (ref 3.5–5.3)
RBC # BLD: 2.94 M/UL — LOW (ref 4.2–5.8)
RBC # FLD: 13.3 % — SIGNIFICANT CHANGE UP (ref 10.3–14.5)
SODIUM SERPL-SCNC: 137 MMOL/L — SIGNIFICANT CHANGE UP (ref 135–145)
WBC # BLD: 10.1 K/UL — SIGNIFICANT CHANGE UP (ref 3.8–10.5)
WBC # FLD AUTO: 10.1 K/UL — SIGNIFICANT CHANGE UP (ref 3.8–10.5)

## 2022-01-08 PROCEDURE — 99233 SBSQ HOSP IP/OBS HIGH 50: CPT

## 2022-01-08 RX ADMIN — PANTOPRAZOLE SODIUM 40 MILLIGRAM(S): 20 TABLET, DELAYED RELEASE ORAL at 12:38

## 2022-01-08 RX ADMIN — Medication 3 MILLILITER(S): at 01:00

## 2022-01-08 RX ADMIN — Medication 1 DROP(S): at 18:02

## 2022-01-08 RX ADMIN — APIXABAN 5 MILLIGRAM(S): 2.5 TABLET, FILM COATED ORAL at 06:11

## 2022-01-08 RX ADMIN — SODIUM CHLORIDE 3 MILLILITER(S): 9 INJECTION INTRAMUSCULAR; INTRAVENOUS; SUBCUTANEOUS at 18:41

## 2022-01-08 RX ADMIN — ATORVASTATIN CALCIUM 40 MILLIGRAM(S): 80 TABLET, FILM COATED ORAL at 23:28

## 2022-01-08 RX ADMIN — CEFEPIME 100 MILLIGRAM(S): 1 INJECTION, POWDER, FOR SOLUTION INTRAMUSCULAR; INTRAVENOUS at 18:01

## 2022-01-08 RX ADMIN — Medication 650 MILLIGRAM(S): at 06:43

## 2022-01-08 RX ADMIN — Medication 650 MILLIGRAM(S): at 23:31

## 2022-01-08 RX ADMIN — CHLORHEXIDINE GLUCONATE 15 MILLILITER(S): 213 SOLUTION TOPICAL at 06:13

## 2022-01-08 RX ADMIN — HUMAN INSULIN 12 UNIT(S): 100 INJECTION, SUSPENSION SUBCUTANEOUS at 12:40

## 2022-01-08 RX ADMIN — SODIUM CHLORIDE 3 MILLILITER(S): 9 INJECTION INTRAMUSCULAR; INTRAVENOUS; SUBCUTANEOUS at 12:38

## 2022-01-08 RX ADMIN — Medication 4 MILLILITER(S): at 23:29

## 2022-01-08 RX ADMIN — HUMAN INSULIN 12 UNIT(S): 100 INJECTION, SUSPENSION SUBCUTANEOUS at 23:28

## 2022-01-08 RX ADMIN — Medication 4 MILLILITER(S): at 06:12

## 2022-01-08 RX ADMIN — Medication 125 MILLIGRAM(S): at 13:25

## 2022-01-08 RX ADMIN — Medication 1 TABLET(S): at 12:40

## 2022-01-08 RX ADMIN — Medication 4 MILLILITER(S): at 18:07

## 2022-01-08 RX ADMIN — Medication 12.5 MILLIGRAM(S): at 06:11

## 2022-01-08 RX ADMIN — Medication 2: at 23:30

## 2022-01-08 RX ADMIN — CHLORHEXIDINE GLUCONATE 15 MILLILITER(S): 213 SOLUTION TOPICAL at 18:01

## 2022-01-08 RX ADMIN — Medication 2 MILLIGRAM(S): at 06:11

## 2022-01-08 RX ADMIN — Medication 2: at 06:12

## 2022-01-08 RX ADMIN — Medication 1 DROP(S): at 23:29

## 2022-01-08 RX ADMIN — Medication 125 MILLIGRAM(S): at 06:11

## 2022-01-08 RX ADMIN — Medication 1 DROP(S): at 06:13

## 2022-01-08 RX ADMIN — HUMAN INSULIN 12 UNIT(S): 100 INJECTION, SUSPENSION SUBCUTANEOUS at 06:12

## 2022-01-08 RX ADMIN — Medication 500 MILLIGRAM(S): at 06:12

## 2022-01-08 RX ADMIN — Medication 3 MILLILITER(S): at 12:36

## 2022-01-08 RX ADMIN — APIXABAN 5 MILLIGRAM(S): 2.5 TABLET, FILM COATED ORAL at 18:02

## 2022-01-08 RX ADMIN — Medication 125 MILLIGRAM(S): at 23:27

## 2022-01-08 RX ADMIN — HUMAN INSULIN 12 UNIT(S): 100 INJECTION, SUSPENSION SUBCUTANEOUS at 18:03

## 2022-01-08 RX ADMIN — Medication 500 MILLIGRAM(S): at 13:25

## 2022-01-08 RX ADMIN — Medication 500 MILLIGRAM(S): at 22:08

## 2022-01-08 RX ADMIN — Medication 3 MILLILITER(S): at 06:10

## 2022-01-08 RX ADMIN — Medication 3 MILLILITER(S): at 23:28

## 2022-01-08 RX ADMIN — Medication 3 MILLILITER(S): at 18:01

## 2022-01-08 RX ADMIN — SODIUM CHLORIDE 3 MILLILITER(S): 9 INJECTION INTRAMUSCULAR; INTRAVENOUS; SUBCUTANEOUS at 23:29

## 2022-01-08 RX ADMIN — Medication 650 MILLIGRAM(S): at 06:13

## 2022-01-08 RX ADMIN — Medication 2: at 01:01

## 2022-01-08 RX ADMIN — Medication 1 DROP(S): at 12:35

## 2022-01-08 RX ADMIN — Medication 12.5 MILLIGRAM(S): at 18:06

## 2022-01-08 RX ADMIN — Medication 1 DROP(S): at 01:00

## 2022-01-08 RX ADMIN — Medication 650 MILLIGRAM(S): at 12:40

## 2022-01-08 RX ADMIN — Medication 650 MILLIGRAM(S): at 13:10

## 2022-01-08 RX ADMIN — Medication 4 MILLILITER(S): at 00:59

## 2022-01-08 RX ADMIN — Medication 4 MILLILITER(S): at 12:36

## 2022-01-08 RX ADMIN — SODIUM CHLORIDE 3 MILLILITER(S): 9 INJECTION INTRAMUSCULAR; INTRAVENOUS; SUBCUTANEOUS at 06:11

## 2022-01-08 RX ADMIN — CEFEPIME 100 MILLIGRAM(S): 1 INJECTION, POWDER, FOR SOLUTION INTRAMUSCULAR; INTRAVENOUS at 06:10

## 2022-01-08 RX ADMIN — HUMAN INSULIN 12 UNIT(S): 100 INJECTION, SUSPENSION SUBCUTANEOUS at 01:01

## 2022-01-08 RX ADMIN — Medication 81 MILLIGRAM(S): at 12:39

## 2022-01-08 RX ADMIN — SODIUM CHLORIDE 3 MILLILITER(S): 9 INJECTION INTRAMUSCULAR; INTRAVENOUS; SUBCUTANEOUS at 01:01

## 2022-01-08 NOTE — PROGRESS NOTE ADULT - PROBLEM SELECTOR PLAN 5
- trach dislodged a couple of times requiring adjustment by ENT. CT chest with high riding trach tube. ENT follow up  - continue trach care and suctioning  - monitor O2 sats  - continue nebs. change to xopenex if tachycardia persists but overall improved  - small pneumothorax appears to be stable- appreciate thoracic surgery input  - chest PT if able  - Pulm has been following, not a RCU candidate at this time

## 2022-01-08 NOTE — PROGRESS NOTE ADULT - ASSESSMENT
62 yo male with PMH of HTN   Admitted on 11/4 with severe headache and found to have cerebellar bleed.  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  S/p trach and peg.   He has distal DVT's - on apixaban.  He received cefepime 11/4-11/21 for respiratory coverage.  Developed watery C diff diarrhea on 11/26 - started on oral vancomycin   Then restarted on cefepime on 11/28 for concerns of pneumonia since had low grade temps. Giorgio neb added later  But CXR's remained clear.  Ct of brain showed residual blood.  Pseudomonas in sputum found to be resistant to carbapenems & quinolones. Sputum isolated strep pneumoniae as well    11/28 urine and blood cultures are negative.  Cefepime completed on 12/06 & Giorgio nebs on 12/07/21  Failed TOV & teixeira was replaced for retention of 1000 ml of urine on 12/06/21 PM  Spiked a fever to 102F ? in response to retention  UA with 3 WBCs, no nit or LE, large blood   CXR with clear lungs  CTH revealed a Pseudomeningocele, s/p tap with gram stain without organism  CSF finalized as no growth  Blood & urine cx also finalized as no growth  Completed treatment for C diff w 2 wks of oral vanco on 12/10/21  Diarrhea resolved  He developed recurrent fever and relapsed with C diff  Vanco restarted on 12/22  He has a rectal tube in place.  At high risk for aspiration and  infections given history of retention and poor mental status.  His fecal output has decreased  He still requires ISC  CXR 12/31 with small left PTX  pyuria acceptable with ISC, he now has a teixeira  CXR read as possible KATHERINE  infiltrate  Low grade fevers continue  but is otherwise clinically stable  E coli in urine can be viewed as coonizer, he requires Q6 ISC  vitals reviewed today he is currently afebrile   complete two weeks po vancomycin now on taper   1/6 blood cx reviewed no growth     Plan   continue  on week 1 of taper  125 tid x 1 week, 125 BID x 1 week, 125 daily x 1 week, and 125 every other day x 2 weeks  day 5   cefepime and metronidazole to complete a five day course for aspiration. last day today  continue supportive care per medicine

## 2022-01-08 NOTE — PROGRESS NOTE ADULT - SUBJECTIVE AND OBJECTIVE BOX
Mohsin Khan, MD  Attending Physician, Division Of Hospital Medicine  Pager: [phone], Office: [phone]  Off hour pager: [phone]    Patient is a 61y old  Male who presents with a chief complaint of Posterior fossa hemorrhage     SUBJECTIVE / OVERNIGHT EVENTS:  Seen, examined the patient this am  No acute events overnight. Remains nonverbal, afebrile, unable to participate in review of systems due to clinical status. VSS    MEDICATIONS  (STANDING):  acetylcysteine 20%  Inhalation 4 milliLiter(s) Inhalation every 6 hours  albuterol/ipratropium for Nebulization 3 milliLiter(s) Nebulizer every 6 hours  apixaban 5 milliGRAM(s) Enteral Tube every 12 hours  artificial  tears Solution 1 Drop(s) Both EYES every 6 hours  aspirin  chewable 81 milliGRAM(s) Oral daily  atorvastatin 40 milliGRAM(s) Oral at bedtime  cefepime   IVPB      cefepime   IVPB 2000 milliGRAM(s) IV Intermittent every 12 hours  chlorhexidine 0.12% Liquid 15 milliLiter(s) Oral Mucosa two times a day  dextrose 50% Injectable 25 Gram(s) IV Push once  dextrose 50% Injectable 12.5 Gram(s) IV Push once  doxazosin 2 milliGRAM(s) Oral daily  insulin lispro (ADMELOG) corrective regimen sliding scale   SubCutaneous every 6 hours  insulin NPH human recombinant 12 Unit(s) SubCutaneous every 6 hours  metoprolol tartrate 12.5 milliGRAM(s) Oral two times a day  metroNIDAZOLE    Tablet 500 milliGRAM(s) Oral three times a day  multivitamin 1 Tablet(s) Oral daily  pantoprazole  Injectable 40 milliGRAM(s) IV Push daily  sodium chloride 3%  Inhalation 3 milliLiter(s) Inhalation every 6 hours  vancomycin    Solution 125 milliGRAM(s) Oral three times a day    MEDICATIONS  (PRN):  acetaminophen    Suspension .. 650 milliGRAM(s) Enteral Tube every 6 hours PRN Temp greater or equal to 38C (100.4F), Mild Pain (1 - 3)      Vital Signs Last 24 Hrs  T(C): 36.9 (2022 08:35), Max: 37.1 (2022 00:19)  T(F): 98.4 (2022 08:35), Max: 98.8 (2022 04:37)  HR: 88 (2022 08:35) (88 - 105)  BP: 98/63 (2022 08:35) (98/63 - 127/83)  BP(mean): --  RR: 18 (2022 08:35) (18 - 18)  SpO2: 100% (2022 08:35) (99% - 100%)  CAPILLARY BLOOD GLUCOSE      POCT Blood Glucose.: 152 mg/dL (2022 05:15)  POCT Blood Glucose.: 169 mg/dL (2022 00:58)  POCT Blood Glucose.: 160 mg/dL (2022 17:35)  POCT Blood Glucose.: 151 mg/dL (2022 12:19)    I&O's Summary    2022 07:01  -  2022 07:00  --------------------------------------------------------  IN: 1735 mL / OUT: 1225 mL / NET: 510 mL        PHYSICAL EXAM:-  CONSTITUTIONAL: Chronically ill appearing male laying in bed in NAD  EYES: Closed, not opening to verbal or tactile stimuli  NECK: +Trach  RESPIRATORY: Normal respiratory effort; rhonchi bilaterally  CARDIOVASCULAR: Regular rate and rhythm, normal S1 and S2, no murmur/rub/gallop; No lower extremity edema  ABDOMEN: Nontender to palpation, normoactive bowel sounds, no rebound/guarding, +PEG  MUSCULOSKELETAL: No clubbing or cyanosis of digits; no joint swelling or tenderness to palpation  PSYCH: Not awakening to verbal stimuli, not withdrawing to pain  NEUROLOGY: Does not follow commands, nonverbal  SKIN: No rashes; no palpable lesions      LABS:                        8.5    10.10 )-----------( 390      ( 2022 05:23 )             27.9     01-08    137  |  97  |  25<H>  ----------------------------<  129<H>  5.1   |  27  |  0.58    Ca    9.1      2022 05:23            Urinalysis Basic - ( 2022 13:23 )    Color: Yellow / Appearance: Slightly Turbid / S.022 / pH: x  Gluc: x / Ketone: Negative  / Bili: Negative / Urobili: Negative   Blood: x / Protein: 30 mg/dL / Nitrite: Negative   Leuk Esterase: Moderate / RBC: 178 /hpf / WBC 20 /HPF   Sq Epi: x / Non Sq Epi: 1 /hpf / Bacteria: Negative        RADIOLOGY & ADDITIONAL TESTS:    Imaging Personally Reviewed: CXR, CT brain  Consultant(s) Notes Reviewed: GI, ID, Pulmonary, ENT, NeuroSx

## 2022-01-08 NOTE — PROGRESS NOTE ADULT - SUBJECTIVE AND OBJECTIVE BOX
CC: f/u for C Diff, fver, and aspiration pneumonia    Patient reports: he is non verbal on TC    REVIEW OF SYSTEMS:  All other review of systems negative (Comprehensive ROS): less mdiarrhea    Antimicrobials Day #  :day 5 antibiotics, week one of taper  cefepime   IVPB      cefepime   IVPB 2000 milliGRAM(s) IV Intermittent every 12 hours  metroNIDAZOLE  IVPB      metroNIDAZOLE  IVPB 500 milliGRAM(s) IV Intermittent every 8 hours  vancomycin    Solution 125 milliGRAM(s) Oral three times a day    Other Medications Reviewed    Vital Signs Last 24 Hrs  T(C): 36.9 (2022 08:35), Max: 37.1 (2022 00:19)  T(F): 98.4 (2022 08:35), Max: 98.8 (2022 04:37)  HR: 88 (2022 08:35) (88 - 105)  BP: 98/63 (2022 08:35) (98/63 - 127/83)  BP(mean): --  RR: 18 (2022 08:35) (18 - 19)  SpO2: 100% (2022 08:35) (99% - 100%)    PHYSICAL EXAM:  General: lethargic, no acute distress  Eyes:  anicteric, no conjunctival injection, no discharge  Oropharynx: no lesions or injection 	  Neck: supple, trach  Lungs: scattered ronchi  Heart: regular rate and rhythm; no murmur, rubs or gallops  Abdomen: soft, nondistended, nontender, without mass or organomegaly, peg  Skin: no lesions  Extremities: no clubbing, cyanosis,+ edema  Neurologic: poorly interactive  rectal tube in place    LAB RESULTS:                                   8.5    10.10 )-----------( 390      ( 2022 05:23 )             27.9     01-08    137  |  97  |  25<H>  ----------------------------<  129<H>  5.1   |  27  |  0.58    Ca    9.1      2022 05:23            Urinalysis Basic - ( 2022 13:23 )    Color: Yellow / Appearance: Slightly Turbid / S.022 / pH: x  Gluc: x / Ketone: Negative  / Bili: Negative / Urobili: Negative   Blood: x / Protein: 30 mg/dL / Nitrite: Negative   Leuk Esterase: Moderate / RBC: 178 /hpf / WBC 20 /HPF   Sq Epi: x / Non Sq Epi: 1 /hpf / Bacteria: Negative      MICROBIOLOGY:  RECENT CULTURES:   @ 22:10 .Sputum Sputum     Normal Respiratory Susan present    Numerous polymorphonuclear leukocytes per low power field  Numerous Squamous epithelial cells per low power field  Numerous Gram Positive Rods per oil power field  Few Gram Positive Cocci in Pairs and Chains per oil power field  Few Gram Variable Rods per oil power field  Results consistent with oropharyngeal contamination        RADIOLOGY REVIEWED:    < from: Xray Chest 1 View- PORTABLE-Urgent (Xray Chest 1 View- PORTABLE-Urgent .) (22 @ 07:22) >  IMPRESSION:    Bilateral opacities in the lower lungs which are improved from one day   prior.    --- End of Report ---    < end of copied text >  < from: CT Chest No Cont (22 @ 22:34) >  IMPRESSION:  Complete bilateral lower lobe atelectasis; superimposed aspiration cannot   be excluded on this noncontrast CT. Additional centrilobular nodules in   the left upper lobe are concerning for aspiration/infection.    Small left and trace right pleural effusions. New trace anterior left   pneumothorax.    High-riding tracheostomy tube; consider advancing.    --- End of Report ---    < end of copied text >

## 2022-01-08 NOTE — PROGRESS NOTE ADULT - ASSESSMENT
61 year old man with respiratory failure d/t ICH    1. ICH  -per neurosurgery  -s/p  shunt  -MRI done, as per neuro    2. Respiratory failure  -for tracheostomy, will require long term enteral nutrition  -s/p PEG, continue TF and use of abdominal binder  - aspiration precautions     3. Enterobacter PNA  -s/p course of antibiotics     4. Anemia, no overt GI bleed. EGD unremarkable.   -hgb stable  -monitor for GI bleeding     5. Cdiff infection   -on vanco taper  -continue banatrol in feeds  -~50cc loose stools rectal tube drainage bag, improving  -consider dc of rectal tube    6. DVT on a/c  -apixaban 5mg resumed  -continue PPI     7. Pneumothorax  -per [facility]  Gastroenterology and Hepatology  [address] [place], [place]  Office: [phone]  Cell: [phone]

## 2022-01-08 NOTE — PROGRESS NOTE ADULT - PROBLEM SELECTOR PLAN 2
Afebrile, but developed recurrent fever and diarrhea few days back. found to have recurrent c. diff infection, and already completed course of PO vanco previously.  - ID plan noted- continue PO vancomycin slow taper, 125 tid x 1 week, 125 BID x 1 week, 125 daily x 1 week, and 125 every other day x 2 weeks  - will consider d/c rectal tube soon if the output is decreasing  - judicious use of abx therapy as may exacerbate c. diff

## 2022-01-08 NOTE — PROGRESS NOTE ADULT - PROBLEM SELECTOR PLAN 7
Found to have Right soleal vein DVT and persistent left posterior tibial, peroneal, gastrocnemius and soleal vein DVT without proximal propagation  - started on Eliquis 12/2, monitor H/H

## 2022-01-08 NOTE — PROGRESS NOTE ADULT - PROBLEM SELECTOR PLAN 1
Afebrile, Tmax 98.8F, WBC 10  - CT chest result noted: complete b/l lower lobe atelectasis and findings concerning for possible aspiration/infection  - patient with copious secretions, episode of hypoxia improved after suctioning  - ID f/u plan noted- rec IV Cefepime and flagyl for possible aspiration pneumonia, 5/5 days today  - will follow up blood cultures

## 2022-01-08 NOTE — PROGRESS NOTE ADULT - PROBLEM SELECTOR PLAN 11
CT a/p with a 2.4 cm nodular soft tissue density in the bladder appears separate from the prostate gland, concerning for bladder lesion  - as per urology - findings concerning for bladder tumor vs prostate gland. cysto transurethral resection would require general anesthesia. recommended checking urine cytology to determine next steps which returned negative. will still need outpatient urology follow-up regardless once acute issues improve  - PSA normal, urine cytology negative for malignancy  - previously spoke to the patient's wife, Sandrita Bauer [phone]. Given the patient's underlying comorbid conditions coupled with his recent devastating neurologic events, other medical complications that ensued, and his poor neurologic and functional status at this time, it may be prudent to monitor his progress and see if he makes any meaningful recovery over the coming weeks to months to decide whether the benefit of pursuing any invasive work up for the bladder lesion outweighs the risk. Spouse seems to be in agreement.  - Will require eventual outpatient f/u with Urology

## 2022-01-09 LAB
ANION GAP SERPL CALC-SCNC: 11 MMOL/L — SIGNIFICANT CHANGE UP (ref 5–17)
BUN SERPL-MCNC: 27 MG/DL — HIGH (ref 7–23)
CALCIUM SERPL-MCNC: 9.2 MG/DL — SIGNIFICANT CHANGE UP (ref 8.4–10.5)
CHLORIDE SERPL-SCNC: 96 MMOL/L — SIGNIFICANT CHANGE UP (ref 96–108)
CO2 SERPL-SCNC: 30 MMOL/L — SIGNIFICANT CHANGE UP (ref 22–31)
CREAT SERPL-MCNC: 0.6 MG/DL — SIGNIFICANT CHANGE UP (ref 0.5–1.3)
GLUCOSE BLDC GLUCOMTR-MCNC: 119 MG/DL — HIGH (ref 70–99)
GLUCOSE BLDC GLUCOMTR-MCNC: 132 MG/DL — HIGH (ref 70–99)
GLUCOSE BLDC GLUCOMTR-MCNC: 133 MG/DL — HIGH (ref 70–99)
GLUCOSE SERPL-MCNC: 131 MG/DL — HIGH (ref 70–99)
HCT VFR BLD CALC: 26.1 % — LOW (ref 39–50)
HGB BLD-MCNC: 7.9 G/DL — LOW (ref 13–17)
MCHC RBC-ENTMCNC: 28.6 PG — SIGNIFICANT CHANGE UP (ref 27–34)
MCHC RBC-ENTMCNC: 30.3 GM/DL — LOW (ref 32–36)
MCV RBC AUTO: 94.6 FL — SIGNIFICANT CHANGE UP (ref 80–100)
NRBC # BLD: 0 /100 WBCS — SIGNIFICANT CHANGE UP (ref 0–0)
PLATELET # BLD AUTO: 348 K/UL — SIGNIFICANT CHANGE UP (ref 150–400)
POTASSIUM SERPL-MCNC: 4.3 MMOL/L — SIGNIFICANT CHANGE UP (ref 3.5–5.3)
POTASSIUM SERPL-SCNC: 4.3 MMOL/L — SIGNIFICANT CHANGE UP (ref 3.5–5.3)
RBC # BLD: 2.76 M/UL — LOW (ref 4.2–5.8)
RBC # FLD: 13.3 % — SIGNIFICANT CHANGE UP (ref 10.3–14.5)
SODIUM SERPL-SCNC: 137 MMOL/L — SIGNIFICANT CHANGE UP (ref 135–145)
WBC # BLD: 9.64 K/UL — SIGNIFICANT CHANGE UP (ref 3.8–10.5)
WBC # FLD AUTO: 9.64 K/UL — SIGNIFICANT CHANGE UP (ref 3.8–10.5)

## 2022-01-09 PROCEDURE — 99233 SBSQ HOSP IP/OBS HIGH 50: CPT

## 2022-01-09 RX ADMIN — CHLORHEXIDINE GLUCONATE 15 MILLILITER(S): 213 SOLUTION TOPICAL at 05:27

## 2022-01-09 RX ADMIN — HUMAN INSULIN 12 UNIT(S): 100 INJECTION, SUSPENSION SUBCUTANEOUS at 17:07

## 2022-01-09 RX ADMIN — Medication 125 MILLIGRAM(S): at 14:41

## 2022-01-09 RX ADMIN — Medication 12.5 MILLIGRAM(S): at 17:03

## 2022-01-09 RX ADMIN — Medication 1 DROP(S): at 05:25

## 2022-01-09 RX ADMIN — Medication 2 MILLIGRAM(S): at 05:25

## 2022-01-09 RX ADMIN — HUMAN INSULIN 12 UNIT(S): 100 INJECTION, SUSPENSION SUBCUTANEOUS at 13:18

## 2022-01-09 RX ADMIN — Medication 4 MILLILITER(S): at 12:49

## 2022-01-09 RX ADMIN — Medication 125 MILLIGRAM(S): at 21:28

## 2022-01-09 RX ADMIN — ATORVASTATIN CALCIUM 40 MILLIGRAM(S): 80 TABLET, FILM COATED ORAL at 21:28

## 2022-01-09 RX ADMIN — Medication 125 MILLIGRAM(S): at 05:24

## 2022-01-09 RX ADMIN — CHLORHEXIDINE GLUCONATE 15 MILLILITER(S): 213 SOLUTION TOPICAL at 17:03

## 2022-01-09 RX ADMIN — Medication 650 MILLIGRAM(S): at 21:57

## 2022-01-09 RX ADMIN — SODIUM CHLORIDE 3 MILLILITER(S): 9 INJECTION INTRAMUSCULAR; INTRAVENOUS; SUBCUTANEOUS at 12:50

## 2022-01-09 RX ADMIN — Medication 3 MILLILITER(S): at 12:49

## 2022-01-09 RX ADMIN — APIXABAN 5 MILLIGRAM(S): 2.5 TABLET, FILM COATED ORAL at 17:07

## 2022-01-09 RX ADMIN — Medication 650 MILLIGRAM(S): at 21:27

## 2022-01-09 RX ADMIN — Medication 3 MILLILITER(S): at 17:04

## 2022-01-09 RX ADMIN — Medication 12.5 MILLIGRAM(S): at 05:26

## 2022-01-09 RX ADMIN — PANTOPRAZOLE SODIUM 40 MILLIGRAM(S): 20 TABLET, DELAYED RELEASE ORAL at 13:00

## 2022-01-09 RX ADMIN — Medication 1 DROP(S): at 12:50

## 2022-01-09 RX ADMIN — Medication 650 MILLIGRAM(S): at 05:26

## 2022-01-09 RX ADMIN — Medication 81 MILLIGRAM(S): at 13:03

## 2022-01-09 RX ADMIN — SODIUM CHLORIDE 3 MILLILITER(S): 9 INJECTION INTRAMUSCULAR; INTRAVENOUS; SUBCUTANEOUS at 05:25

## 2022-01-09 RX ADMIN — Medication 1 TABLET(S): at 13:03

## 2022-01-09 RX ADMIN — SODIUM CHLORIDE 3 MILLILITER(S): 9 INJECTION INTRAMUSCULAR; INTRAVENOUS; SUBCUTANEOUS at 17:05

## 2022-01-09 RX ADMIN — Medication 4 MILLILITER(S): at 05:23

## 2022-01-09 RX ADMIN — APIXABAN 5 MILLIGRAM(S): 2.5 TABLET, FILM COATED ORAL at 05:25

## 2022-01-09 RX ADMIN — Medication 650 MILLIGRAM(S): at 00:01

## 2022-01-09 RX ADMIN — HUMAN INSULIN 12 UNIT(S): 100 INJECTION, SUSPENSION SUBCUTANEOUS at 05:24

## 2022-01-09 RX ADMIN — Medication 1 DROP(S): at 17:03

## 2022-01-09 RX ADMIN — Medication 3 MILLILITER(S): at 05:24

## 2022-01-09 RX ADMIN — Medication 4 MILLILITER(S): at 17:05

## 2022-01-09 RX ADMIN — Medication 650 MILLIGRAM(S): at 05:56

## 2022-01-09 NOTE — PROGRESS NOTE ADULT - SUBJECTIVE AND OBJECTIVE BOX
CC: f/u for C Diff, fver, and aspiration pneumonia    Patient reports: he is non verbal on TC    REVIEW OF SYSTEMS:  All other review of systems negative (Comprehensive ROS): less mdiarrhea    Antimicrobials Day #  :day 5 antibiotics, week one of taper  cefepime   IVPB      cefepime   IVPB 2000 milliGRAM(s) IV Intermittent every 12 hours  metroNIDAZOLE  IVPB      metroNIDAZOLE  IVPB 500 milliGRAM(s) IV Intermittent every 8 hours  vancomycin    Solution 125 milliGRAM(s) Oral three times a day    Other Medications Reviewed    Vital Signs Last 24 Hrs  T(C): 36.7 (22 @ 07:20), Max: 37 (22 @ 23:36)  T(F): 98.1 (22 @ 07:20), Max: 98.6 (22 @ 23:36)  HR: 91 (22 @ 07:20) (79 - 99)  BP: 123/71 (22 @ 07:20) (114/62 - 128/87)  BP(mean): --  RR: 20 (22 @ 07:20) (18 - 20)  SpO2: 100% (22 @ 07:20) (95% - 100%)        PHYSICAL EXAM:  General: lethargic, no acute distress  Eyes:  anicteric, no conjunctival injection, no discharge  Oropharynx: no lesions or injection 	  Neck: supple, trach  Lungs: scattered ronchi  Heart: regular rate and rhythm; no murmur, rubs or gallops  Abdomen: soft, nondistended, nontender, without mass or organomegaly, peg  Skin: no lesions  Extremities: no clubbing, cyanosis,+ edema  Neurologic: poorly interactive  rectal tube in place    LAB RESULTS:                                              7.9    9.64  )-----------( 348      ( 2022 05:30 )             26.1             Urinalysis Basic - ( 2022 13:23 )    Color: Yellow / Appearance: Slightly Turbid / S.022 / pH: x  Gluc: x / Ketone: Negative  / Bili: Negative / Urobili: Negative   Blood: x / Protein: 30 mg/dL / Nitrite: Negative   Leuk Esterase: Moderate / RBC: 178 /hpf / WBC 20 /HPF   Sq Epi: x / Non Sq Epi: 1 /hpf / Bacteria: Negative      MICROBIOLOGY:  RECENT CULTURES:   @ 22:10 .Sputum Sputum     Normal Respiratory Susan present    Numerous polymorphonuclear leukocytes per low power field  Numerous Squamous epithelial cells per low power field  Numerous Gram Positive Rods per oil power field  Few Gram Positive Cocci in Pairs and Chains per oil power field  Few Gram Variable Rods per oil power field  Results consistent with oropharyngeal contamination        RADIOLOGY REVIEWED:    < from: Xray Chest 1 View- PORTABLE-Urgent (Xray Chest 1 View- PORTABLE-Urgent .) (22 @ 07:22) >  IMPRESSION:    Bilateral opacities in the lower lungs which are improved from one day   prior.    --- End of Report ---    < end of copied text >  < from: CT Chest No Cont (22 @ 22:34) >  IMPRESSION:  Complete bilateral lower lobe atelectasis; superimposed aspiration cannot   be excluded on this noncontrast CT. Additional centrilobular nodules in   the left upper lobe are concerning for aspiration/infection.    Small left and trace right pleural effusions. New trace anterior left   pneumothorax.    High-riding tracheostomy tube; consider advancing.    --- End of Report ---    < end of copied text >

## 2022-01-09 NOTE — PROGRESS NOTE ADULT - PROBLEM SELECTOR PLAN 3
overall stable. Hb 7.9 this am. Generally is in 7-8 range with intermittent need for transfusion.  - transfused 1u PRBC on 12/22 for Hb 6.8 with appropriate response  - monitor Hb on CBC daily  - transfuse for Hb<7

## 2022-01-09 NOTE — PROGRESS NOTE ADULT - PROBLEM SELECTOR PLAN 11
CT a/p with a 2.4 cm nodular soft tissue density in the bladder appears separate from the prostate gland, concerning for bladder lesion  - as per urology - findings concerning for bladder tumor vs prostate gland. cysto transurethral resection would require general anesthesia. recommended checking urine cytology to determine next steps which returned negative. will still need outpatient urology follow-up regardless once acute issues improve  - PSA normal, urine cytology negative for malignancy  - previously spoke to the patient's wife, Sandrita Bauer [phone]. Given the patient's underlying comorbid conditions coupled with his recent devastating neurologic events, other medical complications that ensued, and his poor neurologic and functional status at this time, it may be prudent to monitor his progress and see if he makes any meaningful recovery over the coming weeks to months to decide whether the benefit of pursuing any invasive work up for the bladder lesion outweighs the risk. Spouse seems to be in agreement.  - Will require eventual outpatient f/u with Urology

## 2022-01-09 NOTE — PROGRESS NOTE ADULT - PROBLEM SELECTOR PLAN 1
Afebrile, Tmax 98.6F, WBC 9.6. CT chest result noted: complete b/l lower lobe atelectasis and findings concerning for possible aspiration/infection  - patient with copious secretions, episode of hypoxia improved after suctioning  - ID f/u plan noted- completed IV Cefepime and flagyl for possible aspiration pneumonia 5/5 days on 1/8  - follow up blood cultures NTD  - aspiration precaution  - on Vanco via PEG

## 2022-01-09 NOTE — PROGRESS NOTE ADULT - PROBLEM SELECTOR PLAN 5
- trach dislodged a couple of times requiring adjustment by ENT. CT chest with high riding trach tube. ENT follow up  - continue trach care and suctioning  - monitor O2 sats  - continue nebs. changed to xopenex if tachycardia persists but overall improved  - small pneumothorax appears to be stable- appreciate thoracic surgery input  - chest PT if able  - Pulm has been following, not a RCU candidate at this time

## 2022-01-09 NOTE — PROGRESS NOTE ADULT - ASSESSMENT
61 year old man with respiratory failure d/t ICH    1. ICH  -per neurosurgery  -s/p  shunt      2. Respiratory failure  -for tracheostomy, will require long term enteral nutrition  -s/p PEG, continue TF and use of abdominal binder  - d/w RN, they will add banatrol for loose stool    3. Enterobacter PNA  -s/p course of antibiotics     4. Anemia, no overt GI bleed. EGD unremarkable. Anemia of chronic disease  -hgb stable  -monitor for GI bleeding     5. Cdiff infection   -on vanco taper  -doubt active cdiff at this time, spoke w RN to add banatrol    6. DVT on a/c  -apixaban 5mg resumed  -continue PPI     7. Pneumothorax  -per [facility]  Gastroenterology and Hepatology  [address] [place], [place]  Office: [phone]  Cell: [phone]

## 2022-01-09 NOTE — PROGRESS NOTE ADULT - SUBJECTIVE AND OBJECTIVE BOX
Chief Complaint:  Patient is a 61y old  Male who presents with a chief complaint of AMS (08 Jan 2022 11:09)      Date of service 01-09-22 @ 09:23      Interval Events:   still w watery stool    Hospital Medications:  acetaminophen    Suspension .. 650 milliGRAM(s) Enteral Tube every 6 hours PRN  acetylcysteine 20%  Inhalation 4 milliLiter(s) Inhalation every 6 hours  albuterol/ipratropium for Nebulization 3 milliLiter(s) Nebulizer every 6 hours  apixaban 5 milliGRAM(s) Enteral Tube every 12 hours  artificial  tears Solution 1 Drop(s) Both EYES every 6 hours  aspirin  chewable 81 milliGRAM(s) Oral daily  atorvastatin 40 milliGRAM(s) Oral at bedtime  chlorhexidine 0.12% Liquid 15 milliLiter(s) Oral Mucosa two times a day  dextrose 50% Injectable 25 Gram(s) IV Push once  dextrose 50% Injectable 12.5 Gram(s) IV Push once  doxazosin 2 milliGRAM(s) Oral daily  insulin lispro (ADMELOG) corrective regimen sliding scale   SubCutaneous every 6 hours  insulin NPH human recombinant 12 Unit(s) SubCutaneous every 6 hours  metoprolol tartrate 12.5 milliGRAM(s) Oral two times a day  multivitamin 1 Tablet(s) Oral daily  pantoprazole  Injectable 40 milliGRAM(s) IV Push daily  sodium chloride 3%  Inhalation 3 milliLiter(s) Inhalation every 6 hours  vancomycin    Solution 125 milliGRAM(s) Oral three times a day        Review of Systems:  cannot obtain    PHYSICAL EXAM:   Vital Signs:  Vital Signs Last 24 Hrs  T(C): 36.7 (09 Jan 2022 07:20), Max: 37 (08 Jan 2022 23:36)  T(F): 98.1 (09 Jan 2022 07:20), Max: 98.6 (08 Jan 2022 23:36)  HR: 91 (09 Jan 2022 07:20) (79 - 99)  BP: 123/71 (09 Jan 2022 07:20) (102/76 - 128/87)  BP(mean): --  RR: 20 (09 Jan 2022 07:20) (18 - 20)  SpO2: 100% (09 Jan 2022 07:20) (95% - 100%)  Daily     Daily       PHYSICAL EXAM:     GENERAL:  Appears stated age, well-groomed, well-nourished, no distress  HEENT:  NC/AT,  conjunctivae anicteric, clear and pink, tracheostomy  NECK: supple, trachea midline  CHEST:  Full & symmetric excursion, no increased effort, breath sounds clear  HEART:  Regular rhythm, no JVD  ABDOMEN:  Soft, non-tender, non-distended, normoactive bowel sounds,  no masses , no hepatosplenomegaly, gastrostomy  EXTREMITIES:  no cyanosis,clubbing or edema  SKIN:  No rash, erythema, or, ecchymoses, no jaundice  NEURO:   non-focal, no asterixis  RECTAL: Deferred      LABS Personally reviewed by me:                        7.9    9.64  )-----------( 348      ( 09 Jan 2022 05:30 )             26.1     Mean Cell Volume: 94.6 fl (01-09-22 @ 05:30)    01-09    137  |  96  |  27<H>  ----------------------------<  131<H>  4.3   |  30  |  0.60    Ca    9.2      09 Jan 2022 05:26                                    7.9    9.64  )-----------( 348      ( 09 Jan 2022 05:30 )             26.1                         8.5    10.10 )-----------( 390      ( 08 Jan 2022 05:23 )             27.9                         7.9    8.73  )-----------( 376      ( 07 Jan 2022 06:59 )             26.1       Imaging personally reviewed by me:

## 2022-01-09 NOTE — PROGRESS NOTE ADULT - SUBJECTIVE AND OBJECTIVE BOX
Mohsin Khan, MD  Attending Physician, Division Of Hospital Medicine  Pager: [phone], Office: [phone]  Off hour pager: [phone]    Patient is a 61y old  Male who presents with a chief complaint of AMS    SUBJECTIVE / OVERNIGHT EVENTS:  No acute events overnight. Tmax 98.6F, PEG in place, Remains nonverbal, afebrile, unable to participate in review of systems due to clinical status. VSS    MEDICATIONS  (STANDING):  acetylcysteine 20%  Inhalation 4 milliLiter(s) Inhalation every 6 hours  albuterol/ipratropium for Nebulization 3 milliLiter(s) Nebulizer every 6 hours  apixaban 5 milliGRAM(s) Enteral Tube every 12 hours  artificial  tears Solution 1 Drop(s) Both EYES every 6 hours  aspirin  chewable 81 milliGRAM(s) Oral daily  atorvastatin 40 milliGRAM(s) Oral at bedtime  chlorhexidine 0.12% Liquid 15 milliLiter(s) Oral Mucosa two times a day  dextrose 50% Injectable 25 Gram(s) IV Push once  dextrose 50% Injectable 12.5 Gram(s) IV Push once  doxazosin 2 milliGRAM(s) Oral daily  insulin lispro (ADMELOG) corrective regimen sliding scale   SubCutaneous every 6 hours  insulin NPH human recombinant 12 Unit(s) SubCutaneous every 6 hours  metoprolol tartrate 12.5 milliGRAM(s) Oral two times a day  multivitamin 1 Tablet(s) Oral daily  pantoprazole  Injectable 40 milliGRAM(s) IV Push daily  sodium chloride 3%  Inhalation 3 milliLiter(s) Inhalation every 6 hours  vancomycin    Solution 125 milliGRAM(s) Oral three times a day    MEDICATIONS  (PRN):  acetaminophen    Suspension .. 650 milliGRAM(s) Enteral Tube every 6 hours PRN Temp greater or equal to 38C (100.4F), Mild Pain (1 - 3)      Vital Signs Last 24 Hrs  T(C): 36.7 (09 Jan 2022 07:20), Max: 37 (08 Jan 2022 23:36)  T(F): 98.1 (09 Jan 2022 07:20), Max: 98.6 (08 Jan 2022 23:36)  HR: 91 (09 Jan 2022 07:20) (79 - 99)  BP: 123/71 (09 Jan 2022 07:20) (114/62 - 128/87)  BP(mean): --  RR: 20 (09 Jan 2022 07:20) (18 - 20)  SpO2: 100% (09 Jan 2022 07:20) (95% - 100%)  CAPILLARY BLOOD GLUCOSE      POCT Blood Glucose.: 119 mg/dL (09 Jan 2022 05:22)  POCT Blood Glucose.: 151 mg/dL (08 Jan 2022 23:24)  POCT Blood Glucose.: 130 mg/dL (08 Jan 2022 17:38)  POCT Blood Glucose.: 136 mg/dL (08 Jan 2022 12:37)    I&O's Summary    08 Jan 2022 07:01  -  09 Jan 2022 07:00  --------------------------------------------------------  IN: 1525 mL / OUT: 1200 mL / NET: 325 mL        PHYSICAL EXAM:-  GENERAL: well-developed, nonverbal  EYES: EOMI, PERRLA, conjunctiva and sclera clear  NECK: Supple, No JVD, no thyromegaly, trache healthy  CHEST/LUNG: Clear to auscultation bilaterally; No wheeze  HEART: Regular rate and rhythm; S1, S2 audible, No murmurs, rubs, or gallops  ABDOMEN: Soft, Nontender, Nondistended; Bowel sounds present, PEG in place  EXTREMITIES:  2+ Peripheral Pulses, No clubbing, cyanosis, or edema  NEURO: Nonverbal, responds to painful stimuli      LABS:                        7.9    9.64  )-----------( 348      ( 09 Jan 2022 05:30 )             26.1     01-09    137  |  96  |  27<H>  ----------------------------<  131<H>  4.3   |  30  |  0.60    Ca    9.2      09 Jan 2022 05:26      RADIOLOGY & ADDITIONAL TESTS:    Imaging Personally Reviewed: CXTR, CT brain  Consultant(s) Notes Reviewed: ID, GI, NeuroSx

## 2022-01-09 NOTE — PROGRESS NOTE ADULT - PROBLEM SELECTOR PLAN 2
Afebrile, but developed recurrent fever and diarrhea few days back. found to have recurrent c. diff infection, and already completed course of PO vanco previously.  - ID f/u plan noted- continue PO vancomycin slow taper, 125 tid x 1 week, 125 BID x 1 week, 125 daily x 1 week, and 125 every other day x 2 weeks  - appreciated GI f/u- Banatrol added for loose stool  - will consider d/c rectal tube soon if the output is decreasing  - judicious use of abx therapy as may exacerbate c. diff

## 2022-01-09 NOTE — PROGRESS NOTE ADULT - ASSESSMENT
60 yo male with PMH of HTN   Admitted on 11/4 with severe headache and found to have cerebellar bleed.  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  S/p trach and peg.   He has distal DVT's - on apixaban.  He received cefepime 11/4-11/21 for respiratory coverage.  Developed watery C diff diarrhea on 11/26 - started on oral vancomycin   Then restarted on cefepime on 11/28 for concerns of pneumonia since had low grade temps. Giorgio neb added later  But CXR's remained clear.  Ct of brain showed residual blood.  Pseudomonas in sputum found to be resistant to carbapenems & quinolones. Sputum isolated strep pneumoniae as well    11/28 urine and blood cultures are negative.  Cefepime completed on 12/06 & Giorgio nebs on 12/07/21  Failed TOV & teixeira was replaced for retention of 1000 ml of urine on 12/06/21 PM  Spiked a fever to 102F ? in response to retention  UA with 3 WBCs, no nit or LE, large blood   CXR with clear lungs  CTH revealed a Pseudomeningocele, s/p tap with gram stain without organism  CSF finalized as no growth  Blood & urine cx also finalized as no growth  Completed treatment for C diff w 2 wks of oral vanco on 12/10/21  Diarrhea resolved  He developed recurrent fever and relapsed with C diff  Vanco restarted on 12/22  He has a rectal tube in place.  At high risk for aspiration and  infections given history of retention and poor mental status.  His fecal output has decreased  He still requires ISC  CXR 12/31 with small left PTX  pyuria acceptable with ISC, he now has a teixeira  CXR read as possible KATHERINE  infiltrate  Low grade fevers continue  but is otherwise clinically stable  E coli in urine can be viewed as coonizer, he requires Q6 ISC  vitals reviewed today he is currently afebrile   complete two weeks po vancomycin now on taper   1/6 blood cx reviewed no growth   completed a five day course of cefepime and flagyl for aspiration pneumonia    Plan   day 5 of 7 of taper week 1   continue  on week 1 of taper  125 tid x 1 week, 125 BID x 1 week, 125 daily x 1 week, and 125 every other day x 2 weeks  continue supportive care per medicine

## 2022-01-10 LAB
ANION GAP SERPL CALC-SCNC: 10 MMOL/L — SIGNIFICANT CHANGE UP (ref 5–17)
BUN SERPL-MCNC: 24 MG/DL — HIGH (ref 7–23)
CALCIUM SERPL-MCNC: 9 MG/DL — SIGNIFICANT CHANGE UP (ref 8.4–10.5)
CHLORIDE SERPL-SCNC: 95 MMOL/L — LOW (ref 96–108)
CO2 SERPL-SCNC: 29 MMOL/L — SIGNIFICANT CHANGE UP (ref 22–31)
CREAT SERPL-MCNC: 0.5 MG/DL — SIGNIFICANT CHANGE UP (ref 0.5–1.3)
GLUCOSE BLDC GLUCOMTR-MCNC: 129 MG/DL — HIGH (ref 70–99)
GLUCOSE BLDC GLUCOMTR-MCNC: 134 MG/DL — HIGH (ref 70–99)
GLUCOSE BLDC GLUCOMTR-MCNC: 134 MG/DL — HIGH (ref 70–99)
GLUCOSE BLDC GLUCOMTR-MCNC: 164 MG/DL — HIGH (ref 70–99)
GLUCOSE BLDC GLUCOMTR-MCNC: 183 MG/DL — HIGH (ref 70–99)
GLUCOSE SERPL-MCNC: 128 MG/DL — HIGH (ref 70–99)
HCT VFR BLD CALC: 25 % — LOW (ref 39–50)
HCT VFR BLD CALC: 25.6 % — LOW (ref 39–50)
HGB BLD-MCNC: 7.8 G/DL — LOW (ref 13–17)
HGB BLD-MCNC: 8 G/DL — LOW (ref 13–17)
MCHC RBC-ENTMCNC: 29 PG — SIGNIFICANT CHANGE UP (ref 27–34)
MCHC RBC-ENTMCNC: 29.1 PG — SIGNIFICANT CHANGE UP (ref 27–34)
MCHC RBC-ENTMCNC: 31.2 GM/DL — LOW (ref 32–36)
MCHC RBC-ENTMCNC: 31.3 GM/DL — LOW (ref 32–36)
MCV RBC AUTO: 92.9 FL — SIGNIFICANT CHANGE UP (ref 80–100)
MCV RBC AUTO: 93.1 FL — SIGNIFICANT CHANGE UP (ref 80–100)
NRBC # BLD: 0 /100 WBCS — SIGNIFICANT CHANGE UP (ref 0–0)
NRBC # BLD: 0 /100 WBCS — SIGNIFICANT CHANGE UP (ref 0–0)
PLATELET # BLD AUTO: 348 K/UL — SIGNIFICANT CHANGE UP (ref 150–400)
PLATELET # BLD AUTO: 349 K/UL — SIGNIFICANT CHANGE UP (ref 150–400)
POTASSIUM SERPL-MCNC: 4.4 MMOL/L — SIGNIFICANT CHANGE UP (ref 3.5–5.3)
POTASSIUM SERPL-SCNC: 4.4 MMOL/L — SIGNIFICANT CHANGE UP (ref 3.5–5.3)
RBC # BLD: 2.69 M/UL — LOW (ref 4.2–5.8)
RBC # BLD: 2.75 M/UL — LOW (ref 4.2–5.8)
RBC # FLD: 13.4 % — SIGNIFICANT CHANGE UP (ref 10.3–14.5)
RBC # FLD: 13.5 % — SIGNIFICANT CHANGE UP (ref 10.3–14.5)
SARS-COV-2 RNA SPEC QL NAA+PROBE: SIGNIFICANT CHANGE UP
SODIUM SERPL-SCNC: 134 MMOL/L — LOW (ref 135–145)
WBC # BLD: 9.3 K/UL — SIGNIFICANT CHANGE UP (ref 3.8–10.5)
WBC # BLD: 9.46 K/UL — SIGNIFICANT CHANGE UP (ref 3.8–10.5)
WBC # FLD AUTO: 9.3 K/UL — SIGNIFICANT CHANGE UP (ref 3.8–10.5)
WBC # FLD AUTO: 9.46 K/UL — SIGNIFICANT CHANGE UP (ref 3.8–10.5)

## 2022-01-10 PROCEDURE — 99232 SBSQ HOSP IP/OBS MODERATE 35: CPT

## 2022-01-10 RX ADMIN — Medication 1 TABLET(S): at 12:47

## 2022-01-10 RX ADMIN — Medication 1 DROP(S): at 12:47

## 2022-01-10 RX ADMIN — Medication 3 MILLILITER(S): at 12:47

## 2022-01-10 RX ADMIN — Medication 1 DROP(S): at 05:05

## 2022-01-10 RX ADMIN — Medication 1 DROP(S): at 00:13

## 2022-01-10 RX ADMIN — PANTOPRAZOLE SODIUM 40 MILLIGRAM(S): 20 TABLET, DELAYED RELEASE ORAL at 13:30

## 2022-01-10 RX ADMIN — APIXABAN 5 MILLIGRAM(S): 2.5 TABLET, FILM COATED ORAL at 05:06

## 2022-01-10 RX ADMIN — Medication 0: at 12:46

## 2022-01-10 RX ADMIN — Medication 4 MILLILITER(S): at 12:46

## 2022-01-10 RX ADMIN — Medication 2 MILLIGRAM(S): at 05:06

## 2022-01-10 RX ADMIN — Medication 4 MILLILITER(S): at 23:33

## 2022-01-10 RX ADMIN — HUMAN INSULIN 12 UNIT(S): 100 INJECTION, SUSPENSION SUBCUTANEOUS at 12:48

## 2022-01-10 RX ADMIN — HUMAN INSULIN 12 UNIT(S): 100 INJECTION, SUSPENSION SUBCUTANEOUS at 05:06

## 2022-01-10 RX ADMIN — SODIUM CHLORIDE 3 MILLILITER(S): 9 INJECTION INTRAMUSCULAR; INTRAVENOUS; SUBCUTANEOUS at 00:11

## 2022-01-10 RX ADMIN — Medication 650 MILLIGRAM(S): at 05:36

## 2022-01-10 RX ADMIN — Medication 3 MILLILITER(S): at 00:12

## 2022-01-10 RX ADMIN — Medication 125 MILLIGRAM(S): at 17:18

## 2022-01-10 RX ADMIN — Medication 4 MILLILITER(S): at 00:11

## 2022-01-10 RX ADMIN — Medication 2: at 00:13

## 2022-01-10 RX ADMIN — ATORVASTATIN CALCIUM 40 MILLIGRAM(S): 80 TABLET, FILM COATED ORAL at 21:05

## 2022-01-10 RX ADMIN — Medication 125 MILLIGRAM(S): at 05:05

## 2022-01-10 RX ADMIN — Medication 1 DROP(S): at 17:18

## 2022-01-10 RX ADMIN — SODIUM CHLORIDE 3 MILLILITER(S): 9 INJECTION INTRAMUSCULAR; INTRAVENOUS; SUBCUTANEOUS at 17:18

## 2022-01-10 RX ADMIN — SODIUM CHLORIDE 3 MILLILITER(S): 9 INJECTION INTRAMUSCULAR; INTRAVENOUS; SUBCUTANEOUS at 12:46

## 2022-01-10 RX ADMIN — Medication 4 MILLILITER(S): at 05:06

## 2022-01-10 RX ADMIN — Medication 4 MILLILITER(S): at 17:19

## 2022-01-10 RX ADMIN — Medication 3 MILLILITER(S): at 23:34

## 2022-01-10 RX ADMIN — HUMAN INSULIN 12 UNIT(S): 100 INJECTION, SUSPENSION SUBCUTANEOUS at 23:34

## 2022-01-10 RX ADMIN — Medication 1 DROP(S): at 23:34

## 2022-01-10 RX ADMIN — SODIUM CHLORIDE 3 MILLILITER(S): 9 INJECTION INTRAMUSCULAR; INTRAVENOUS; SUBCUTANEOUS at 23:34

## 2022-01-10 RX ADMIN — Medication 650 MILLIGRAM(S): at 05:06

## 2022-01-10 RX ADMIN — Medication 3 MILLILITER(S): at 17:18

## 2022-01-10 RX ADMIN — HUMAN INSULIN 12 UNIT(S): 100 INJECTION, SUSPENSION SUBCUTANEOUS at 17:18

## 2022-01-10 RX ADMIN — Medication 125 MILLIGRAM(S): at 21:04

## 2022-01-10 RX ADMIN — HUMAN INSULIN 12 UNIT(S): 100 INJECTION, SUSPENSION SUBCUTANEOUS at 00:12

## 2022-01-10 RX ADMIN — Medication 12.5 MILLIGRAM(S): at 17:19

## 2022-01-10 RX ADMIN — SODIUM CHLORIDE 3 MILLILITER(S): 9 INJECTION INTRAMUSCULAR; INTRAVENOUS; SUBCUTANEOUS at 05:05

## 2022-01-10 RX ADMIN — Medication 81 MILLIGRAM(S): at 12:48

## 2022-01-10 RX ADMIN — Medication 2: at 23:35

## 2022-01-10 RX ADMIN — Medication 12.5 MILLIGRAM(S): at 05:05

## 2022-01-10 RX ADMIN — Medication 3 MILLILITER(S): at 05:05

## 2022-01-10 RX ADMIN — APIXABAN 5 MILLIGRAM(S): 2.5 TABLET, FILM COATED ORAL at 17:20

## 2022-01-10 RX ADMIN — CHLORHEXIDINE GLUCONATE 15 MILLILITER(S): 213 SOLUTION TOPICAL at 05:07

## 2022-01-10 NOTE — PROGRESS NOTE ADULT - SUBJECTIVE AND OBJECTIVE BOX
[facility] Division of Hospital Medicine  Philippe Carr DO  Pager (KIMBERLY, 4X-7T): [phone]  Other Times:  [phone]    Patient is a 61y old  Male who presents with a chief complaint of AMS (09 Jan 2022 11:08)    SUBJECTIVE / OVERNIGHT EVENTS: No acute events overnight. Patient seen and examined at bedside this morning, patient unable to participate in review of systems due to clinical status.    ADDITIONAL REVIEW OF SYSTEMS: Patient unable to participate in review of systems due to clinical status.    MEDICATIONS  (STANDING):  acetylcysteine 20%  Inhalation 4 milliLiter(s) Inhalation every 6 hours  albuterol/ipratropium for Nebulization 3 milliLiter(s) Nebulizer every 6 hours  apixaban 5 milliGRAM(s) Enteral Tube every 12 hours  artificial  tears Solution 1 Drop(s) Both EYES every 6 hours  aspirin  chewable 81 milliGRAM(s) Oral daily  atorvastatin 40 milliGRAM(s) Oral at bedtime  chlorhexidine 0.12% Liquid 15 milliLiter(s) Oral Mucosa two times a day  dextrose 50% Injectable 25 Gram(s) IV Push once  dextrose 50% Injectable 12.5 Gram(s) IV Push once  doxazosin 2 milliGRAM(s) Oral daily  insulin lispro (ADMELOG) corrective regimen sliding scale   SubCutaneous every 6 hours  insulin NPH human recombinant 12 Unit(s) SubCutaneous every 6 hours  metoprolol tartrate 12.5 milliGRAM(s) Oral two times a day  multivitamin 1 Tablet(s) Oral daily  pantoprazole  Injectable 40 milliGRAM(s) IV Push daily  sodium chloride 3%  Inhalation 3 milliLiter(s) Inhalation every 6 hours  vancomycin    Solution 125 milliGRAM(s) Oral three times a day    MEDICATIONS  (PRN):  acetaminophen    Suspension .. 650 milliGRAM(s) Enteral Tube every 6 hours PRN Temp greater or equal to 38C (100.4F), Mild Pain (1 - 3)      CAPILLARY BLOOD GLUCOSE      POCT Blood Glucose.: 129 mg/dL (10 Nima 2022 12:22)  POCT Blood Glucose.: 134 mg/dL (10 Nima 2022 05:02)  POCT Blood Glucose.: 183 mg/dL (10 Nima 2022 00:10)  POCT Blood Glucose.: 132 mg/dL (09 Jan 2022 17:05)    I&O's Summary    09 Jan 2022 07:01  -  10 Nima 2022 07:00  --------------------------------------------------------  IN: 1100 mL / OUT: 1100 mL / NET: 0 mL    10 Nima 2022 07:01  -  10 Nima 2022 13:32  --------------------------------------------------------  IN: 550 mL / OUT: 1500 mL / NET: -950 mL        PHYSICAL EXAM:  Vital Signs Last 24 Hrs  T(C): 36.5 (10 Nima 2022 08:45), Max: 37.1 (09 Jan 2022 15:54)  T(F): 97.7 (10 Nima 2022 08:45), Max: 98.8 (09 Jan 2022 15:54)  HR: 90 (10 Nima 2022 08:45) (88 - 93)  BP: 116/65 (10 Nima 2022 08:45) (115/72 - 141/89)  BP(mean): --  RR: 18 (10 Nima 2022 08:45) (18 - 21)  SpO2: 100% (10 Nima 2022 08:45) (100% - 100%)    CONSTITUTIONAL: Chronically ill appearing male laying in bed in NAD  EYES: Closed, not opening to verbal or tactile stimuli  NECK: +Trach  RESPIRATORY: Normal respiratory effort; rhonchi bilaterally  CARDIOVASCULAR: Regular rate and rhythm, normal S1 and S2, no murmur/rub/gallop; No lower extremity edema  ABDOMEN: Nontender to palpation, normoactive bowel sounds, no rebound/guarding, +PEG  MUSCULOSKELETAL: No clubbing or cyanosis of digits; no joint swelling or tenderness to palpation  PSYCH: Not awakening to verbal stimuli, not withdrawing to pain  NEUROLOGY: Does not follow commands   SKIN: No rashes; no palpable lesions    LABS:                        7.8    9.46  )-----------( 348      ( 10 Nima 2022 08:42 )             25.0     01-10    134<L>  |  95<L>  |  24<H>  ----------------------------<  128<H>  4.4   |  29  |  0.50    Ca    9.0      10 Nima 2022 08:42

## 2022-01-10 NOTE — PROGRESS NOTE ADULT - PROBLEM SELECTOR PLAN 1
No recent fevers, no leukocytosis, CT chest result noted: complete b/l lower lobe atelectasis and findings concerning for possible aspiration/infection  - patient with copious secretions, episode of hypoxia improved after suctioning  - ID f/u plan noted- completed IV Cefepime and flagyl for possible aspiration pneumonia 5/5 days on 1/8  - follow up blood cultures NGTD  - aspiration precaution  - on Vanco via PEG for c diff

## 2022-01-10 NOTE — PROGRESS NOTE ADULT - SUBJECTIVE AND OBJECTIVE BOX
INTERVAL HPI/OVERNIGHT EVENTS:  Pt seen and examined  still with diarrhea, however improving    MEDICATIONS  (STANDING):  acetylcysteine 20%  Inhalation 4 milliLiter(s) Inhalation every 6 hours  albuterol/ipratropium for Nebulization 3 milliLiter(s) Nebulizer every 6 hours  apixaban 5 milliGRAM(s) Enteral Tube every 12 hours  artificial  tears Solution 1 Drop(s) Both EYES every 6 hours  aspirin  chewable 81 milliGRAM(s) Oral daily  atorvastatin 40 milliGRAM(s) Oral at bedtime  chlorhexidine 0.12% Liquid 15 milliLiter(s) Oral Mucosa two times a day  dextrose 50% Injectable 25 Gram(s) IV Push once  dextrose 50% Injectable 12.5 Gram(s) IV Push once  doxazosin 2 milliGRAM(s) Oral daily  insulin lispro (ADMELOG) corrective regimen sliding scale   SubCutaneous every 6 hours  insulin NPH human recombinant 12 Unit(s) SubCutaneous every 6 hours  metoprolol tartrate 12.5 milliGRAM(s) Oral two times a day  multivitamin 1 Tablet(s) Oral daily  pantoprazole  Injectable 40 milliGRAM(s) IV Push daily  sodium chloride 3%  Inhalation 3 milliLiter(s) Inhalation every 6 hours  vancomycin    Solution 125 milliGRAM(s) Oral three times a day    MEDICATIONS  (PRN):  acetaminophen    Suspension .. 650 milliGRAM(s) Enteral Tube every 6 hours PRN Temp greater or equal to 38C (100.4F), Mild Pain (1 - 3)      Allergies    penicillin (Other)    Intolerances        Review of Systems:  cannot obtain      Vital Signs Last 24 Hrs  T(C): 36.5 (10 Nima 2022 08:45), Max: 37.1 (09 Jan 2022 15:54)  T(F): 97.7 (10 Nima 2022 08:45), Max: 98.8 (09 Jan 2022 15:54)  HR: 90 (10 Nima 2022 08:45) (88 - 93)  BP: 116/65 (10 Nima 2022 08:45) (115/72 - 141/89)  BP(mean): --  RR: 18 (10 Nima 2022 08:45) (18 - 21)  SpO2: 100% (10 Nima 2022 08:45) (100% - 100%)    PHYSICAL EXAM:    GENERAL:   no distress  HEENT:  NC/AT,  conjunctivae anicteric, clear and pink, tracheostomy  NECK: supple, trachea midline  CHEST:  Full & symmetric excursion, no increased effort, breath sounds clear  ABDOMEN:  Soft, non-tender, non-distended, normoactive bowel sounds,  no masses , no hepatosplenomegaly, gastrostomy  EXTREMITIES:  no cyanosis,clubbing or edema  SKIN:  No rash, erythema, or, ecchymoses, no jaundice  NEURO:   non-focal, no asterixis  RECTAL: Deferred      LABS:                        7.8    9.46  )-----------( 348      ( 10 Nima 2022 08:42 )             25.0     01-10    134<L>  |  95<L>  |  24<H>  ----------------------------<  128<H>  4.4   |  29  |  0.50    Ca    9.0      10 Nima 2022 08:42            RADIOLOGY & ADDITIONAL TESTS:

## 2022-01-10 NOTE — PROGRESS NOTE ADULT - PROBLEM SELECTOR PLAN 11
CT a/p with a 2.4 cm nodular soft tissue density in the bladder appears separate from the prostate gland, concerning for bladder lesion  - as per urology - findings concerning for bladder tumor vs prostate gland. cysto transurethral resection would require general anesthesia. recommended checking urine cytology to determine next steps which returned negative. will still need outpatient urology follow-up regardless once acute issues improve  - PSA normal, urine cytology negative for malignancy  - previously spoke to the patient's wife, Sandrita Bauer [phone]. Given the patient's underlying comorbid conditions coupled with his recent devastating neurologic events, other medical complications that ensued, and his poor neurologic and functional status at this time, it may be prudent to monitor his progress and see if he makes any meaningful recovery over the coming weeks to months to decide whether the benefit of pursuing any invasive work up for the bladder lesion outweighs the risk. Spouse seems to be in agreement.  - Will require eventual outpatient f/u with Urology

## 2022-01-10 NOTE — PROGRESS NOTE ADULT - ASSESSMENT
61 year old man with respiratory failure d/t ICH    1. ICH  -per neurosurgery  -s/p  shunt      2. Respiratory failure  - for tracheostomy, will require long term enteral nutrition  - s/p PEG, continue TF and use of abdominal binder  - banatrol added for loose stool    3. Enterobacter PNA  -s/p course of antibiotics     4. Anemia, no overt GI bleed. EGD unremarkable. Anemia of chronic disease  -hgb stable  -monitor for GI bleeding     5. Cdiff infection   -on vanco taper  -doubt active cdiff at this time    6. DVT on a/c  -apixaban 5mg resumed  -continue PPI     7. Pneumothorax  -per [facility]  Gastroenterology and Hepatology  [address] [place], [place]  Office: [phone]  Cell: [phone]

## 2022-01-10 NOTE — PROGRESS NOTE ADULT - PROBLEM SELECTOR PLAN 3
overall stable in 7-8 range, with intermittent need for transfusion.  - transfused 1u PRBC on 12/22 for Hb 6.8 with appropriate response  - monitor Hb on CBC daily  - transfuse for Hb<7

## 2022-01-10 NOTE — PROGRESS NOTE ADULT - SUBJECTIVE AND OBJECTIVE BOX
CC: f/u for  cerebellar bleed  Patient reports  nothing  REVIEW OF SYSTEMS:  All other review of systems negative (Comprehensive ROS)cannot get    Antimicrobials Day #  :6/7  vancomycin    Solution 125 milliGRAM(s) Oral three times a day    Other Medications Reviewed    T(F): 98 (01-10-22 @ 19:49), Max: 98.7 (01-10-22 @ 16:00)  HR: 84 (01-10-22 @ 19:49)  BP: 134/83 (01-10-22 @ 19:49)  RR: 18 (01-10-22 @ 19:49)  SpO2: 100% (01-10-22 @ 19:49)  Wt(kg): --    PHYSICAL EXAM:  General:not interactive, no acute distress  Eyes:  anicteric, no conjunctival injection, no discharge  Oropharynx: no lesions or injection 	  Neck: supple, without adenopathy. trach  Lungs: course to auscultation  Heart: regular rate and rhythm; no murmur, rubs or gallops  Abdomen: soft, nondistended, nontender, without mass or organomegaly, peg  Skin: no lesions  Extremities: no clubbing, cyanosis, or edema  Neurologic: alert, oriented, moves all extremities    LAB RESULTS:                        7.8    9.46  )-----------( 348      ( 10 Nima 2022 08:42 )             25.0     01-10    134<L>  |  95<L>  |  24<H>  ----------------------------<  128<H>  4.4   |  29  |  0.50    Ca    9.0      10 Nima 2022 08:42          MICROBIOLOGY:  RECENT CULTURES:  01-06 @ 19:34 .Blood Blood-Peripheral     No growth to date.      01-06 @ 19:22 .Blood Blood-Peripheral     No growth to date.          RADIOLOGY REVIEWED:    < from: CT Chest No Cont (01.03.22 @ 22:34) >    ACC: [id number] EXAM:  CT CHEST                          PROCEDURE DATE:  01/03/2022          INTERPRETATION:  CLINICAL INFORMATION: Stroke. Concern for pneumonia.    COMPARISON: CT chest 12/16/2021.    CONTRAST/COMPLICATIONS:  IV Contrast: None.  Oral Contrast: None.  Complications: None reported.    PROCEDURE:  CT of the Chest was performed.  Sagittal and coronal reformats were performed.    FINDINGS:    LUNGS, AIRWAYS AND PLEURA: High riding tracheostomy tube. There are small   left and trace right pleural effusions with complete lower lobe   compressive atelectasis. There is a new small left anterior pneumothorax.   There are new centrilobular groundglass nodules throughout the left upper   lobe, likely infectious/inflammatory.    MEDIASTINUM AND ANABELLA: No thoracic lymphadenopathy.    HEART/VESSELS: The heart is normal in size. The great vessels are normal   in size. Coronary arterial calcification with RCA stenting. No   pericardial effusion.    VISUALIZED UPPER ABDOMEN: Trace perihepatic ascites. Percutaneous   gastrostomy tube with intraluminal balloon. Hyperdense left renal nodules   may represent hemorrhagic/proteinaceous cysts.    BONES/SOFT TISSUES: Partially imaged ventriculoperitoneal shunt courses   along the right anterior chest and abdominal wall.    IMPRESSION:  Complete bilateral lower lobe atelectasis; superimposed aspiration cannot   be excluded on this noncontrast CT. Additional centrilobular nodules in   the left upper lobe are concerning for aspiration/infection.    Small left and trace right pleural effusions. New trace anterior left   pneumothorax.    High-riding tracheostomy tube; consider advancing.    --- End of Report ---        < end of copied text >            Assessment:  patient with cerebellar bleed, s/p soc then pica aneurysm resection, vps, peg trach, cdif and pneumonia treated, relapsed cdif now on taper po vanco, just finished 5 d cefepime flagyl for pneumonia as well.   Plan:  continue po vanco taper as previously outlined

## 2022-01-11 LAB
ANION GAP SERPL CALC-SCNC: 11 MMOL/L — SIGNIFICANT CHANGE UP (ref 5–17)
BUN SERPL-MCNC: 20 MG/DL — SIGNIFICANT CHANGE UP (ref 7–23)
CALCIUM SERPL-MCNC: 9.1 MG/DL — SIGNIFICANT CHANGE UP (ref 8.4–10.5)
CHLORIDE SERPL-SCNC: 94 MMOL/L — LOW (ref 96–108)
CO2 SERPL-SCNC: 30 MMOL/L — SIGNIFICANT CHANGE UP (ref 22–31)
CREAT SERPL-MCNC: 0.55 MG/DL — SIGNIFICANT CHANGE UP (ref 0.5–1.3)
CULTURE RESULTS: SIGNIFICANT CHANGE UP
CULTURE RESULTS: SIGNIFICANT CHANGE UP
GLUCOSE BLDC GLUCOMTR-MCNC: 135 MG/DL — HIGH (ref 70–99)
GLUCOSE BLDC GLUCOMTR-MCNC: 169 MG/DL — HIGH (ref 70–99)
GLUCOSE BLDC GLUCOMTR-MCNC: 170 MG/DL — HIGH (ref 70–99)
GLUCOSE BLDC GLUCOMTR-MCNC: 190 MG/DL — HIGH (ref 70–99)
GLUCOSE SERPL-MCNC: 169 MG/DL — HIGH (ref 70–99)
HCT VFR BLD CALC: 25.7 % — LOW (ref 39–50)
HGB BLD-MCNC: 8.2 G/DL — LOW (ref 13–17)
MAGNESIUM SERPL-MCNC: 2 MG/DL — SIGNIFICANT CHANGE UP (ref 1.6–2.6)
MCHC RBC-ENTMCNC: 29.6 PG — SIGNIFICANT CHANGE UP (ref 27–34)
MCHC RBC-ENTMCNC: 31.9 GM/DL — LOW (ref 32–36)
MCV RBC AUTO: 92.8 FL — SIGNIFICANT CHANGE UP (ref 80–100)
NRBC # BLD: 0 /100 WBCS — SIGNIFICANT CHANGE UP (ref 0–0)
PHOSPHATE SERPL-MCNC: 3.1 MG/DL — SIGNIFICANT CHANGE UP (ref 2.5–4.5)
PLATELET # BLD AUTO: 390 K/UL — SIGNIFICANT CHANGE UP (ref 150–400)
POTASSIUM SERPL-MCNC: 4.1 MMOL/L — SIGNIFICANT CHANGE UP (ref 3.5–5.3)
POTASSIUM SERPL-SCNC: 4.1 MMOL/L — SIGNIFICANT CHANGE UP (ref 3.5–5.3)
RBC # BLD: 2.77 M/UL — LOW (ref 4.2–5.8)
RBC # FLD: 13.3 % — SIGNIFICANT CHANGE UP (ref 10.3–14.5)
SODIUM SERPL-SCNC: 135 MMOL/L — SIGNIFICANT CHANGE UP (ref 135–145)
SPECIMEN SOURCE: SIGNIFICANT CHANGE UP
SPECIMEN SOURCE: SIGNIFICANT CHANGE UP
WBC # BLD: 13.11 K/UL — HIGH (ref 3.8–10.5)
WBC # FLD AUTO: 13.11 K/UL — HIGH (ref 3.8–10.5)

## 2022-01-11 PROCEDURE — 99232 SBSQ HOSP IP/OBS MODERATE 35: CPT

## 2022-01-11 RX ORDER — PSYLLIUM SEED (WITH DEXTROSE)
1 POWDER (GRAM) ORAL DAILY
Refills: 0 | Status: DISCONTINUED | OUTPATIENT
Start: 2022-01-11 | End: 2022-01-26

## 2022-01-11 RX ADMIN — Medication 81 MILLIGRAM(S): at 13:53

## 2022-01-11 RX ADMIN — CHLORHEXIDINE GLUCONATE 15 MILLILITER(S): 213 SOLUTION TOPICAL at 05:40

## 2022-01-11 RX ADMIN — APIXABAN 5 MILLIGRAM(S): 2.5 TABLET, FILM COATED ORAL at 18:28

## 2022-01-11 RX ADMIN — Medication 3 MILLILITER(S): at 13:50

## 2022-01-11 RX ADMIN — Medication 2: at 18:25

## 2022-01-11 RX ADMIN — Medication 2: at 05:39

## 2022-01-11 RX ADMIN — SODIUM CHLORIDE 3 MILLILITER(S): 9 INJECTION INTRAMUSCULAR; INTRAVENOUS; SUBCUTANEOUS at 13:50

## 2022-01-11 RX ADMIN — Medication 125 MILLIGRAM(S): at 13:54

## 2022-01-11 RX ADMIN — PANTOPRAZOLE SODIUM 40 MILLIGRAM(S): 20 TABLET, DELAYED RELEASE ORAL at 13:53

## 2022-01-11 RX ADMIN — APIXABAN 5 MILLIGRAM(S): 2.5 TABLET, FILM COATED ORAL at 05:38

## 2022-01-11 RX ADMIN — CHLORHEXIDINE GLUCONATE 15 MILLILITER(S): 213 SOLUTION TOPICAL at 18:28

## 2022-01-11 RX ADMIN — Medication 1 DROP(S): at 18:26

## 2022-01-11 RX ADMIN — Medication 3 MILLILITER(S): at 05:40

## 2022-01-11 RX ADMIN — SODIUM CHLORIDE 3 MILLILITER(S): 9 INJECTION INTRAMUSCULAR; INTRAVENOUS; SUBCUTANEOUS at 05:38

## 2022-01-11 RX ADMIN — Medication 1 TABLET(S): at 13:52

## 2022-01-11 RX ADMIN — Medication 125 MILLIGRAM(S): at 05:38

## 2022-01-11 RX ADMIN — SODIUM CHLORIDE 3 MILLILITER(S): 9 INJECTION INTRAMUSCULAR; INTRAVENOUS; SUBCUTANEOUS at 18:27

## 2022-01-11 RX ADMIN — HUMAN INSULIN 12 UNIT(S): 100 INJECTION, SUSPENSION SUBCUTANEOUS at 12:42

## 2022-01-11 RX ADMIN — Medication 12.5 MILLIGRAM(S): at 05:37

## 2022-01-11 RX ADMIN — Medication 3 MILLILITER(S): at 18:26

## 2022-01-11 RX ADMIN — Medication 1 DROP(S): at 13:51

## 2022-01-11 RX ADMIN — Medication 12.5 MILLIGRAM(S): at 18:28

## 2022-01-11 RX ADMIN — Medication 2 MILLIGRAM(S): at 05:38

## 2022-01-11 RX ADMIN — Medication 1 DROP(S): at 05:38

## 2022-01-11 RX ADMIN — HUMAN INSULIN 12 UNIT(S): 100 INJECTION, SUSPENSION SUBCUTANEOUS at 18:25

## 2022-01-11 RX ADMIN — Medication 4 MILLILITER(S): at 18:26

## 2022-01-11 RX ADMIN — Medication 4 MILLILITER(S): at 13:51

## 2022-01-11 RX ADMIN — HUMAN INSULIN 12 UNIT(S): 100 INJECTION, SUSPENSION SUBCUTANEOUS at 05:39

## 2022-01-11 RX ADMIN — Medication 4 MILLILITER(S): at 05:37

## 2022-01-11 NOTE — CHART NOTE - NSCHARTNOTEFT_GEN_A_CORE
Nutrition Follow Up Note  Patient seen for: Follow-up     Chart reviewed, events noted.  Pt is a 61M with PMH of HTN, CAD s/p stent on DAPT, T2DM who complained of headache and subsequently became unresponsive. Found to have posterior fossa hemorrhage, IVH, hydrocephalus, s/p EVD and SOC on 11/4. 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 Course c/b respiratory failure and dysphagia s/p trach/PEG (11/19), VPS (11/23), Certas @4 completed course of cefepime for enterobacter and pseudomonas pneumonia on 11/21, fevers, c. diff colitis, urinary retention, b/l distal DVT. Trach changed 12/15 to #7 cuffless due to dislodgement. 12/16 CT A/P wo concern for infectious process. but concern for bladder lesion, wife will pursue possible treatment after rehab. Now with another bout of C.Diff found after fever workup, on PO vanco per ID, blood cultures negative to date. Rectal tube placed.   - Trach dislodged again on 12/31, replaced by ENT, c/b small left pneumothorax and recurrent fever.  - Still C.Diff+. On Vanco via PEG. Considering d/c rectal tube once the output is decreasing   - Chest PT is able. Pulmonology has been following, not a RCU candidate at this time  - MICHELLE resolved. Will require eventual outpatient f/u with urology once acute issues are resolved     Source: [] Patient       [x] EMR        [x] RN        [] Family at bedside       [] Other:    -If unable to interview patient: [x] Trach/Vent/BiPAP  [x] Disoriented/confused/inappropriate to interview    Diet Order: NPO with Tube Feed:   Tube Feeding Modality: Gastrostomy  Glucerna 1.2 Hiwot (GLUCERNARTH)  Total Volume for 24 Hours (mL): 1800  Continuous  Until Goal Tube Feed Rate (mL per Hour): 75  Tube Feed Duration (in Hours): 24  Tube Feed Start Time: 12:00  Free Water Flush  Bolus   Total Volume per Flush (mL): 250   Frequency: Every 6 Hours  Charly(7 Gm Arginine/7 Gm Glut/1.2 Gm HMB     Qty per Day:  2  Banatrol TF     Qty per Day:  2  Supplement Feeding Modality:  Gastrostomy  Probiotic Yogurt/Smoothie Cans or Servings Per Day:  2       Frequency:  Daily (01-03-22)    EN Order provides: 1800mL, 2160 hiwot (26cal/kg) and 108 g protein (1.3 g/kg)     Nutrition-related concerns:   - TF infusing at 75mL/hr at RD visit  - C.Diff positive. Has rectal tube. Per RN, has not been improving - consistent with flowsheet (12/28: 500mL, 12/29: 100mL, 12/30: 550mL, 1/7: 200mL). Per RN, has been receiving Banatrol x2/day   - On NPH 12 units q6h + insulin lispro     GI:  Last BM _1/11__.   Bowel Regimen?  [x] No    Weights:   No updated weights available at this time. Will continue to monitor weights if any    Nutritionally Pertinent MEDICATIONS  (STANDING):  atorvastatin  dextrose 50% Injectable  dextrose 50% Injectable  doxazosin  insulin lispro (ADMELOG) corrective regimen sliding scale  insulin NPH human recombinant  metoprolol tartrate  multivitamin  pantoprazole  Injectable  vancomycin    Solution    Pertinent Labs: 01-11 @ 07:46: Na 135, BUN 20, Cr 0.55, <H>, K+ 4.1, Phos 3.1, Mg 2.0, Alk Phos --, ALT/SGPT --, AST/SGOT --, HbA1c --    A1C with Estimated Average Glucose Result: 6.5 % (11-04-21 @ 21:43)    Finger Sticks:  POCT Blood Glucose.: 135 mg/dL (01-11 @ 11:43)  POCT Blood Glucose.: 170 mg/dL (01-11 @ 05:33)  POCT Blood Glucose.: 164 mg/dL (01-10 @ 23:31)  POCT Blood Glucose.: 134 mg/dL (01-10 @ 17:15)    Pressure Injuries as per nursing documentation: Suspected DTI on right buttock   Edema: Nonpitting on right hand and arm     Estimated Needs:   [x] no change since previous assessment  Based on upper .6 pounds (83 kg)  - Energy needs:  25-30 kcal/kg (9764-8120 kcal/kg)  - Protein needs:  1.2-1.4 g/kg (99.6-116.2 g/kg)  - Defer fluid needs to medical team    Previous Nutrition Diagnosis: Moderate Malnutrition, Increased Nutrient Needs  Nutrition Diagnosis is: [x] ongoing  [] resolved [] not applicable     New Nutrition Diagnosis: [x] Not applicable    Nutrition Care Plan:  [x] In Progress  [] Achieved  [] Not applicable       Recommendations:         [x] Continue current diet order: Glucerna 1.2 at 75ml/hr x 24 hrs. To provide (Based on    upper IBW 83kg): 1800ml, 2160kcal (26kcal/kg) and 108g protein (1.3g protein/kg). Defer additional free water flushes to team.          - Banatrol x2/day           - Danactive x1/day      [x] Continue oral nutrition supplement: Charly x2/day      [x] Continue current micronutrient supplementation: Multivitamin      [x] Add micronutrient supplementation: Vitamin C to support wound healing      [x] Continue to monitor labs, skin integrity, weight, GI distress and tolerance. RD to remain available to adjusted EN formulary, volume/rate PRN     Monitoring and Evaluation:   Continue to monitor nutritional intake, tolerance to diet prescription, weights, labs, skin integrity    RD remains available upon request and will follow up per protocol  Bethany Sharp, Dietetic Intern (Pager #[phone])

## 2022-01-11 NOTE — PROGRESS NOTE ADULT - SUBJECTIVE AND OBJECTIVE BOX
[facility] Division of Hospital Medicine  Philippe Carr DO  Pager (KIMBERLY, 0U-8X): [phone]  Other Times:  [phone]    Patient is a 61y old  Male who presents with a chief complaint of Posterior fossa hemorrhage (10 Nima 2022 13:30)    SUBJECTIVE / OVERNIGHT EVENTS: No acute events ove    ADDITIONAL REVIEW OF SYSTEMS:    MEDICATIONS  (STANDING):  acetylcysteine 20%  Inhalation 4 milliLiter(s) Inhalation every 6 hours  albuterol/ipratropium for Nebulization 3 milliLiter(s) Nebulizer every 6 hours  apixaban 5 milliGRAM(s) Enteral Tube every 12 hours  artificial  tears Solution 1 Drop(s) Both EYES every 6 hours  aspirin  chewable 81 milliGRAM(s) Oral daily  atorvastatin 40 milliGRAM(s) Oral at bedtime  chlorhexidine 0.12% Liquid 15 milliLiter(s) Oral Mucosa two times a day  dextrose 50% Injectable 25 Gram(s) IV Push once  dextrose 50% Injectable 12.5 Gram(s) IV Push once  doxazosin 2 milliGRAM(s) Oral daily  insulin lispro (ADMELOG) corrective regimen sliding scale   SubCutaneous every 6 hours  insulin NPH human recombinant 12 Unit(s) SubCutaneous every 6 hours  metoprolol tartrate 12.5 milliGRAM(s) Oral two times a day  multivitamin 1 Tablet(s) Oral daily  pantoprazole  Injectable 40 milliGRAM(s) IV Push daily  sodium chloride 3%  Inhalation 3 milliLiter(s) Inhalation every 6 hours  vancomycin    Solution 125 milliGRAM(s) Oral three times a day    MEDICATIONS  (PRN):  acetaminophen    Suspension .. 650 milliGRAM(s) Enteral Tube every 6 hours PRN Temp greater or equal to 38C (100.4F), Mild Pain (1 - 3)      CAPILLARY BLOOD GLUCOSE      POCT Blood Glucose.: 135 mg/dL (11 Jan 2022 11:43)  POCT Blood Glucose.: 170 mg/dL (11 Jan 2022 05:33)  POCT Blood Glucose.: 164 mg/dL (10 Nima 2022 23:31)  POCT Blood Glucose.: 134 mg/dL (10 Nima 2022 17:15)    I&O's Summary    10 Nima 2022 07:01  -  11 Jan 2022 07:00  --------------------------------------------------------  IN: 1450 mL / OUT: 4000 mL / NET: -2550 mL        PHYSICAL EXAM:  Vital Signs Last 24 Hrs  T(C): 36.6 (11 Jan 2022 12:37), Max: 37.1 (10 Nima 2022 16:00)  T(F): 97.9 (11 Jan 2022 12:37), Max: 98.7 (10 Nima 2022 16:00)  HR: 113 (11 Jan 2022 12:37) (84 - 113)  BP: 146/76 (11 Jan 2022 12:37) (121/75 - 149/87)  BP(mean): --  RR: 18 (11 Jan 2022 12:37) (18 - 18)  SpO2: 100% (11 Jan 2022 12:37) (95% - 100%)  CONSTITUTIONAL: NAD, well-developed, well-groomed  EYES: PERRLA; conjunctiva and sclera clear  ENMT: Moist oral mucosa, no pharyngeal injection or exudates; normal dentition  NECK: Supple, no palpable masses; no thyromegaly  RESPIRATORY: Normal respiratory effort; lungs are clear to auscultation bilaterally  CARDIOVASCULAR: Regular rate and rhythm, normal S1 and S2, no murmur/rub/gallop; No lower extremity edema; Peripheral pulses are 2+ bilaterally  ABDOMEN: Nontender to palpation, normoactive bowel sounds, no rebound/guarding; No hepatosplenomegaly  MUSCULOSKELETAL:  Normal gait; no clubbing or cyanosis of digits; no joint swelling or tenderness to palpation  PSYCH: A+O to person, place, and time; affect appropriate  NEUROLOGY: CN 2-12 are intact and symmetric; no gross sensory deficits   SKIN: No rashes; no palpable lesions    LABS:                        8.2    13.11 )-----------( 390      ( 11 Jan 2022 07:46 )             25.7     01-11    135  |  94<L>  |  20  ----------------------------<  169<H>  4.1   |  30  |  0.55    Ca    9.1      11 Jan 2022 07:46  Phos  3.1     01-11  Mg     2.0     01-11                  RADIOLOGY & ADDITIONAL TESTS:  Results Reviewed:   Imaging Personally Reviewed:  Electrocardiogram Personally Reviewed:    COORDINATION OF CARE:  Care Discussed with Consultants/Other Providers [Y/N]:  Prior or Outpatient Records Reviewed [Y/N]:   [facility] Division of Hospital Medicine  Philippe Carr DO  Pager (KIMBERLY, 1E-5T): [phone]  Other Times:  [phone]    Patient is a 61y old  Male who presents with a chief complaint of Posterior fossa hemorrhage (10 Nima 2022 13:30)    SUBJECTIVE / OVERNIGHT EVENTS: No acute events overnight. Patient seen and examined at bedside this morning, patient unable to participate in review of systems due to clinical status.    ADDITIONAL REVIEW OF SYSTEMS: Patient unable to participate in review of systems due to clinical status.    MEDICATIONS  (STANDING):  acetylcysteine 20%  Inhalation 4 milliLiter(s) Inhalation every 6 hours  albuterol/ipratropium for Nebulization 3 milliLiter(s) Nebulizer every 6 hours  apixaban 5 milliGRAM(s) Enteral Tube every 12 hours  artificial  tears Solution 1 Drop(s) Both EYES every 6 hours  aspirin  chewable 81 milliGRAM(s) Oral daily  atorvastatin 40 milliGRAM(s) Oral at bedtime  chlorhexidine 0.12% Liquid 15 milliLiter(s) Oral Mucosa two times a day  dextrose 50% Injectable 25 Gram(s) IV Push once  dextrose 50% Injectable 12.5 Gram(s) IV Push once  doxazosin 2 milliGRAM(s) Oral daily  insulin lispro (ADMELOG) corrective regimen sliding scale   SubCutaneous every 6 hours  insulin NPH human recombinant 12 Unit(s) SubCutaneous every 6 hours  metoprolol tartrate 12.5 milliGRAM(s) Oral two times a day  multivitamin 1 Tablet(s) Oral daily  pantoprazole  Injectable 40 milliGRAM(s) IV Push daily  sodium chloride 3%  Inhalation 3 milliLiter(s) Inhalation every 6 hours  vancomycin    Solution 125 milliGRAM(s) Oral three times a day    MEDICATIONS  (PRN):  acetaminophen    Suspension .. 650 milliGRAM(s) Enteral Tube every 6 hours PRN Temp greater or equal to 38C (100.4F), Mild Pain (1 - 3)      CAPILLARY BLOOD GLUCOSE      POCT Blood Glucose.: 135 mg/dL (11 Jan 2022 11:43)  POCT Blood Glucose.: 170 mg/dL (11 Jan 2022 05:33)  POCT Blood Glucose.: 164 mg/dL (10 Nima 2022 23:31)  POCT Blood Glucose.: 134 mg/dL (10 Nima 2022 17:15)    I&O's Summary    10 Nima 2022 07:01  -  11 Jan 2022 07:00  --------------------------------------------------------  IN: 1450 mL / OUT: 4000 mL / NET: -2550 mL        PHYSICAL EXAM:  Vital Signs Last 24 Hrs  T(C): 36.6 (11 Jan 2022 12:37), Max: 37.1 (10 Nima 2022 16:00)  T(F): 97.9 (11 Jan 2022 12:37), Max: 98.7 (10 Nima 2022 16:00)  HR: 113 (11 Jan 2022 12:37) (84 - 113)  BP: 146/76 (11 Jan 2022 12:37) (121/75 - 149/87)  BP(mean): --  RR: 18 (11 Jan 2022 12:37) (18 - 18)  SpO2: 100% (11 Jan 2022 12:37) (95% - 100%)    CONSTITUTIONAL: Chronically ill appearing male laying in bed in NAD  EYES: Opening eyes today, clear sclera  NECK: +Trach  RESPIRATORY: Normal respiratory effort; rhonchi bilaterally  CARDIOVASCULAR: Regular rate and rhythm, normal S1 and S2, no murmur/rub/gallop; No lower extremity edema  ABDOMEN: Nontender to palpation, normoactive bowel sounds, no rebound/guarding, +PEG, + rectal tube  MUSCULOSKELETAL: No clubbing or cyanosis of digits; no joint swelling or tenderness to palpation  PSYCH: Awake, not withdrawing to pain  NEUROLOGY: Was able to lift up L arm slightly, otherwise not moving extremities  SKIN: No rashes; no palpable lesions    LABS:                        8.2    13.11 )-----------( 390      ( 11 Jan 2022 07:46 )             25.7     01-11    135  |  94<L>  |  20  ----------------------------<  169<H>  4.1   |  30  |  0.55    Ca    9.1      11 Jan 2022 07:46  Phos  3.1     01-11  Mg     2.0     01-11

## 2022-01-11 NOTE — PROGRESS NOTE ADULT - SUBJECTIVE AND OBJECTIVE BOX
[facility]-- WOUND TEAM -- FOLLOW UP NOTE  --------------------------------------------------------------------------------    24 hour events/subjective:    tolerating TF  incontinent  afebrile  C diff   palliative consult appreciated- ongoing [facility]      Diet:  Diet, NPO with Tube Feed:   Tube Feeding Modality: Gastrostomy  Glucerna 1.2 Blake (GLUCERNARTH)  Total Volume for 24 Hours (mL): 1800  Continuous  Until Goal Tube Feed Rate (mL per Hour): 75  Tube Feed Duration (in Hours): 24  Tube Feed Start Time: 12:00  Free Water Flush  Bolus   Total Volume per Flush (mL): 250   Frequency: Every 6 Hours  Charly(7 Gm Arginine/7 Gm Glut/1.2 Gm HMB     Qty per Day:  2  Banatrol TF     Qty per Day:  2  Supplement Feeding Modality:  Gastrostomy  Probiotic Yogurt/Smoothie Cans or Servings Per Day:  2       Frequency:  Daily (01-03-22 @ 12:58)      ROS: pt unable to offer    ALLERGIES & MEDICATIONS  --------------------------------------------------------------------------------  Allergies  penicillin (Other)      STANDING INPATIENT MEDICATIONS  acetylcysteine 20%  Inhalation 4 milliLiter(s) Inhalation every 6 hours  albuterol/ipratropium for Nebulization 3 milliLiter(s) Nebulizer every 6 hours  apixaban 5 milliGRAM(s) Enteral Tube every 12 hours  artificial  tears Solution 1 Drop(s) Both EYES every 6 hours  aspirin  chewable 81 milliGRAM(s) Oral daily  atorvastatin 40 milliGRAM(s) Oral at bedtime  chlorhexidine 0.12% Liquid 15 milliLiter(s) Oral Mucosa two times a day  dextrose 50% Injectable 25 Gram(s) IV Push once  dextrose 50% Injectable 12.5 Gram(s) IV Push once  doxazosin 2 milliGRAM(s) Oral daily  insulin lispro (ADMELOG) corrective regimen sliding scale   SubCutaneous every 6 hours  insulin NPH human recombinant 12 Unit(s) SubCutaneous every 6 hours  metoprolol tartrate 12.5 milliGRAM(s) Oral two times a day  multivitamin 1 Tablet(s) Oral daily  pantoprazole  Injectable 40 milliGRAM(s) IV Push daily  sodium chloride 3%  Inhalation 3 milliLiter(s) Inhalation every 6 hours  vancomycin    Solution 125 milliGRAM(s) Oral three times a day      PRN INPATIENT MEDICATION  acetaminophen    Suspension .. 650 milliGRAM(s) Enteral Tube every 6 hours PRN        VITALS/PHYSICAL EXAM  --------------------------------------------------------------------------------  T(C): 36.9 (01-11-22 @ 15:47), Max: 37 (01-11-22 @ 04:57)  HR: 107 (01-11-22 @ 15:47) (84 - 113)  BP: 120/70 (01-11-22 @ 15:47) (120/70 - 149/87)  RR: 18 (01-11-22 @ 15:47) (18 - 18)  SpO2: 99% (01-11-22 @ 15:47) (95% - 100%)  Wt(kg): --        01-10-22 @ 07:01  -  01-11-22 @ 07:00  --------------------------------------------------------  IN: 1450 mL / OUT: 4000 mL / NET: -2550 mL      Physical Exam:  General: Obtunded  HEENT: NC/AT, PERRLA, mucosa moist, trach collar, neck supple  Gastrointestinal: soft NT/ND (+)PEG  (+)FMS  Neurology: nonverbal, not follow commands  Musculoskeletal: no contractures  Vascular: BLE edema equal, equally warm  Skin: moist w/ good turogr   Sacral/bilateral buttocks 5cm x 4cm x 0.1cm w/ denuded and partial thickness skin          loss w/ improving evolving DTI      scant serosanguinous drainage,      periwound skin is intact w/o blistering - no longer macerated and erythematous.     Bilateral groins intact pale erythema w/o drainage or blistering  No odor, increased warmth, tenderness, induration, fluctuance      LABS/ CULTURES/ RADIOLOGY:              8.2    13.11 >-----------<  390      [01-11-22 @ 07:46]              25.7     135  |  94  |  20  ----------------------------<  169      [01-11-22 @ 07:46]  4.1   |  30  |  0.55        Ca     9.1     [01-11-22 @ 07:46]      Mg     2.0     [01-11-22 @ 07:46]      Phos  3.1     [01-11-22 @ 07:46]      CAPILLARY BLOOD GLUCOSE  POCT Blood Glucose.: 190 mg/dL (11 Jan 2022 17:32)  POCT Blood Glucose.: 135 mg/dL (11 Jan 2022 11:43)  POCT Blood Glucose.: 170 mg/dL (11 Jan 2022 05:33)  POCT Blood Glucose.: 164 mg/dL (10 Nima 2022 23:31)      Culture - Blood (collected 01-06-22 @ 19:34)  Source: .Blood Blood-Peripheral  Preliminary Report (01-07-22 @ 20:02):    No growth to date.    Culture - Blood (collected 01-06-22 @ 19:22)  Source: .Blood Blood-Peripheral  Preliminary Report (01-07-22 @ 20:02):    No growth to date.          A1C with Estimated Average Glucose Result: 6.5 % (11-04-21 @ 21:43)

## 2022-01-11 NOTE — PROGRESS NOTE ADULT - PROBLEM SELECTOR PLAN 11
CT a/p with a 2.4 cm nodular soft tissue density in the bladder appears separate from the prostate gland, concerning for bladder lesion  - as per urology - findings concerning for bladder tumor vs prostate gland. cysto transurethral resection would require general anesthesia. recommended checking urine cytology to determine next steps which returned negative. will still need outpatient urology follow-up regardless once acute issues improve  - PSA normal, urine cytology negative for malignancy  - previously spoke to the patient's wife, Sandrita Bauer [phone]. Given the patient's underlying comorbid conditions coupled with his recent devastating neurologic events, other medical complications that ensued, and his poor neurologic and functional status at this time, it may be prudent to monitor his progress and see if he makes any meaningful recovery over the coming weeks to months to decide whether the benefit of pursuing any invasive work up for the bladder lesion outweighs the risk. Spouse seems to be in agreement.  - Will require eventual outpatient f/u with Urology

## 2022-01-11 NOTE — PROGRESS NOTE ADULT - SUBJECTIVE AND OBJECTIVE BOX
CC: f/u for  cdif relapse  Patient reports nothing     REVIEW OF SYSTEMS:  All other review of systems negative (Comprehensive ROS)cannot get    Antimicrobials Day #  :7/7  vancomycin    Solution 125 milliGRAM(s) Oral three times a day    Other Medications Reviewed    T(F): 98.4 (01-11-22 @ 15:47), Max: 98.6 (01-11-22 @ 04:57)  HR: 107 (01-11-22 @ 15:47)  BP: 120/70 (01-11-22 @ 15:47)  RR: 18 (01-11-22 @ 15:47)  SpO2: 99% (01-11-22 @ 15:47)  Wt(kg): --    PHYSICAL EXAM:  General:  no acute distress  Eyes:  anicteric, no conjunctival injection, no discharge  Oropharynx: no lesions or injection 	  Neck: supple, without adenopathy, trach  Lungs: clear to auscultation  Heart: regular rate and rhythm; no murmur, rubs or gallops  Abdomen: soft, nondistended, nontender, without mass or organomegaly, peg   Skin: no lesions  Extremities: no clubbing, cyanosis, or edema  Neurologic: not interactive   head wound is intact  LAB RESULTS:                        8.2    13.11 )-----------( 390      ( 11 Jan 2022 07:46 )             25.7     01-11    135  |  94<L>  |  20  ----------------------------<  169<H>  4.1   |  30  |  0.55    Ca    9.1      11 Jan 2022 07:46  Phos  3.1     01-11  Mg     2.0     01-11          MICROBIOLOGY:  RECENT CULTURES:      RADIOLOGY REVIEWED:              Assessment:  Patient admitted several weeks ago for cerebellar bleed, s/p suboccipital craniectomy then pica aneurysm resection, peg , trach. Developed cdif and tracheobronchitis treated. He then had relapse of cdif not on taper.  Plan:  tomorrow change enteral vanco to bid for a week the qd for a week , then qod for a week

## 2022-01-11 NOTE — PROGRESS NOTE ADULT - ASSESSMENT
A/p 61M with PMH of HTN, CAD s/p stent on DAPT, T2DM who complained of headache and subsequently became unresponsive found to have posterior fossa hemorrhage, IVH, hydrocephalus, s/p EVD and SOC on 11/4. 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 Course c/b respiratory failure and dysphagia s/p trach/PEG (11/19), VPS (11/23), completed course of cefepime for enterobacter and pseudomonas pneumonia on 11/21, fevers, c. diff colitis, urinary retention, b/l distal DVT. New fevers again on 12/22 with diarrhea found to have recurrent c. diff infection.    Wound Consult to assist w/ management of Sacral/bilateral Buttocks deep tissue injury  Incontinence of bowel  Incontinence Dermatitis  Resolved superimposed fungal rash    Recommend:  1.) sacral/buttock injury -Allevyn       Bilateral groins - InterdryAg after cleansing   2.) Incontinence Management - continue pericare BID, w/ assist of attends underpads            and fecal management system  as per protocol       persistent diarrhea- probiotic and banatrol started, consider metamucil or senna  3.) Maintain on an alternating air with low air loss surface  4.) Turn and reposition Q 2 hours  5.) Nutrition optimization w/ Moderate Malnutrition, Increased Nutrient Needs w/ TF, vit c, mvi, luz marina, & probiotics  6.) Hyperglycemia improving w/ glucerna TF and NPH insulin w/ FS & ISS  7.) Abx as per ID for cdiff, continue use of FMS as appropriate  8.) Offload heels/feet with complete cair air fluidized boots; ensure that the soles of the feet are not resting on the foot board of the bed.  Care as per medicine, Will continue to follow with you  Upon discharge f/u as outpatient at [facility] [address] [facility] [phone]  d/w team & RN  Rosario Gongora PA-C, CWS [id number]  I spent 25mnutes face to face w/ this pt of which more than 50% of the time was spent counseling & coordinating care of this pt.

## 2022-01-12 LAB
ANION GAP SERPL CALC-SCNC: 9 MMOL/L — SIGNIFICANT CHANGE UP (ref 5–17)
BASOPHILS # BLD AUTO: 0.05 K/UL — SIGNIFICANT CHANGE UP (ref 0–0.2)
BASOPHILS NFR BLD AUTO: 0.5 % — SIGNIFICANT CHANGE UP (ref 0–2)
BUN SERPL-MCNC: 17 MG/DL — SIGNIFICANT CHANGE UP (ref 7–23)
CALCIUM SERPL-MCNC: 8.9 MG/DL — SIGNIFICANT CHANGE UP (ref 8.4–10.5)
CHLORIDE SERPL-SCNC: 95 MMOL/L — LOW (ref 96–108)
CO2 SERPL-SCNC: 31 MMOL/L — SIGNIFICANT CHANGE UP (ref 22–31)
CREAT SERPL-MCNC: 0.51 MG/DL — SIGNIFICANT CHANGE UP (ref 0.5–1.3)
EOSINOPHIL # BLD AUTO: 0.31 K/UL — SIGNIFICANT CHANGE UP (ref 0–0.5)
EOSINOPHIL NFR BLD AUTO: 2.8 % — SIGNIFICANT CHANGE UP (ref 0–6)
GLUCOSE BLDC GLUCOMTR-MCNC: 123 MG/DL — HIGH (ref 70–99)
GLUCOSE BLDC GLUCOMTR-MCNC: 171 MG/DL — HIGH (ref 70–99)
GLUCOSE BLDC GLUCOMTR-MCNC: 181 MG/DL — HIGH (ref 70–99)
GLUCOSE BLDC GLUCOMTR-MCNC: 187 MG/DL — HIGH (ref 70–99)
GLUCOSE SERPL-MCNC: 149 MG/DL — HIGH (ref 70–99)
HCT VFR BLD CALC: 24.7 % — LOW (ref 39–50)
HGB BLD-MCNC: 7.6 G/DL — LOW (ref 13–17)
IMM GRANULOCYTES NFR BLD AUTO: 0.6 % — SIGNIFICANT CHANGE UP (ref 0–1.5)
LYMPHOCYTES # BLD AUTO: 1.84 K/UL — SIGNIFICANT CHANGE UP (ref 1–3.3)
LYMPHOCYTES # BLD AUTO: 16.8 % — SIGNIFICANT CHANGE UP (ref 13–44)
MAGNESIUM SERPL-MCNC: 2 MG/DL — SIGNIFICANT CHANGE UP (ref 1.6–2.6)
MCHC RBC-ENTMCNC: 28.6 PG — SIGNIFICANT CHANGE UP (ref 27–34)
MCHC RBC-ENTMCNC: 30.8 GM/DL — LOW (ref 32–36)
MCV RBC AUTO: 92.9 FL — SIGNIFICANT CHANGE UP (ref 80–100)
MONOCYTES # BLD AUTO: 0.57 K/UL — SIGNIFICANT CHANGE UP (ref 0–0.9)
MONOCYTES NFR BLD AUTO: 5.2 % — SIGNIFICANT CHANGE UP (ref 2–14)
NEUTROPHILS # BLD AUTO: 8.13 K/UL — HIGH (ref 1.8–7.4)
NEUTROPHILS NFR BLD AUTO: 74.1 % — SIGNIFICANT CHANGE UP (ref 43–77)
NRBC # BLD: 0 /100 WBCS — SIGNIFICANT CHANGE UP (ref 0–0)
PHOSPHATE SERPL-MCNC: 3.4 MG/DL — SIGNIFICANT CHANGE UP (ref 2.5–4.5)
PLATELET # BLD AUTO: 368 K/UL — SIGNIFICANT CHANGE UP (ref 150–400)
POTASSIUM SERPL-MCNC: 4.3 MMOL/L — SIGNIFICANT CHANGE UP (ref 3.5–5.3)
POTASSIUM SERPL-SCNC: 4.3 MMOL/L — SIGNIFICANT CHANGE UP (ref 3.5–5.3)
RBC # BLD: 2.66 M/UL — LOW (ref 4.2–5.8)
RBC # FLD: 13.6 % — SIGNIFICANT CHANGE UP (ref 10.3–14.5)
SODIUM SERPL-SCNC: 135 MMOL/L — SIGNIFICANT CHANGE UP (ref 135–145)
WBC # BLD: 10.97 K/UL — HIGH (ref 3.8–10.5)
WBC # FLD AUTO: 10.97 K/UL — HIGH (ref 3.8–10.5)

## 2022-01-12 PROCEDURE — 99232 SBSQ HOSP IP/OBS MODERATE 35: CPT

## 2022-01-12 RX ORDER — VANCOMYCIN HCL 1 G
125 VIAL (EA) INTRAVENOUS
Refills: 0 | Status: COMPLETED | OUTPATIENT
Start: 2022-01-12 | End: 2022-01-19

## 2022-01-12 RX ADMIN — Medication 3 MILLILITER(S): at 00:21

## 2022-01-12 RX ADMIN — Medication 1 PACKET(S): at 17:14

## 2022-01-12 RX ADMIN — APIXABAN 5 MILLIGRAM(S): 2.5 TABLET, FILM COATED ORAL at 05:53

## 2022-01-12 RX ADMIN — Medication 650 MILLIGRAM(S): at 22:30

## 2022-01-12 RX ADMIN — Medication 1 DROP(S): at 00:22

## 2022-01-12 RX ADMIN — Medication 125 MILLIGRAM(S): at 00:23

## 2022-01-12 RX ADMIN — CHLORHEXIDINE GLUCONATE 15 MILLILITER(S): 213 SOLUTION TOPICAL at 17:13

## 2022-01-12 RX ADMIN — HUMAN INSULIN 12 UNIT(S): 100 INJECTION, SUSPENSION SUBCUTANEOUS at 17:11

## 2022-01-12 RX ADMIN — Medication 1 DROP(S): at 05:58

## 2022-01-12 RX ADMIN — Medication 1 DROP(S): at 11:39

## 2022-01-12 RX ADMIN — ATORVASTATIN CALCIUM 40 MILLIGRAM(S): 80 TABLET, FILM COATED ORAL at 22:00

## 2022-01-12 RX ADMIN — APIXABAN 5 MILLIGRAM(S): 2.5 TABLET, FILM COATED ORAL at 17:14

## 2022-01-12 RX ADMIN — Medication 1 DROP(S): at 17:14

## 2022-01-12 RX ADMIN — Medication 2: at 00:23

## 2022-01-12 RX ADMIN — Medication 4 MILLILITER(S): at 00:22

## 2022-01-12 RX ADMIN — Medication 2 MILLIGRAM(S): at 05:53

## 2022-01-12 RX ADMIN — Medication 3 MILLILITER(S): at 05:54

## 2022-01-12 RX ADMIN — ATORVASTATIN CALCIUM 40 MILLIGRAM(S): 80 TABLET, FILM COATED ORAL at 00:22

## 2022-01-12 RX ADMIN — Medication 1 TABLET(S): at 11:40

## 2022-01-12 RX ADMIN — Medication 4 MILLILITER(S): at 11:40

## 2022-01-12 RX ADMIN — Medication 3 MILLILITER(S): at 11:40

## 2022-01-12 RX ADMIN — Medication 81 MILLIGRAM(S): at 11:40

## 2022-01-12 RX ADMIN — Medication 125 MILLIGRAM(S): at 17:14

## 2022-01-12 RX ADMIN — Medication 2: at 05:56

## 2022-01-12 RX ADMIN — Medication 2: at 11:41

## 2022-01-12 RX ADMIN — CHLORHEXIDINE GLUCONATE 15 MILLILITER(S): 213 SOLUTION TOPICAL at 05:58

## 2022-01-12 RX ADMIN — Medication 3 MILLILITER(S): at 17:15

## 2022-01-12 RX ADMIN — HUMAN INSULIN 12 UNIT(S): 100 INJECTION, SUSPENSION SUBCUTANEOUS at 23:15

## 2022-01-12 RX ADMIN — SODIUM CHLORIDE 3 MILLILITER(S): 9 INJECTION INTRAMUSCULAR; INTRAVENOUS; SUBCUTANEOUS at 05:54

## 2022-01-12 RX ADMIN — HUMAN INSULIN 12 UNIT(S): 100 INJECTION, SUSPENSION SUBCUTANEOUS at 05:56

## 2022-01-12 RX ADMIN — Medication 2: at 23:14

## 2022-01-12 RX ADMIN — Medication 4 MILLILITER(S): at 23:15

## 2022-01-12 RX ADMIN — Medication 4 MILLILITER(S): at 17:16

## 2022-01-12 RX ADMIN — HUMAN INSULIN 12 UNIT(S): 100 INJECTION, SUSPENSION SUBCUTANEOUS at 00:24

## 2022-01-12 RX ADMIN — SODIUM CHLORIDE 3 MILLILITER(S): 9 INJECTION INTRAMUSCULAR; INTRAVENOUS; SUBCUTANEOUS at 00:22

## 2022-01-12 RX ADMIN — Medication 3 MILLILITER(S): at 23:15

## 2022-01-12 RX ADMIN — Medication 12.5 MILLIGRAM(S): at 05:53

## 2022-01-12 RX ADMIN — HUMAN INSULIN 12 UNIT(S): 100 INJECTION, SUSPENSION SUBCUTANEOUS at 11:42

## 2022-01-12 RX ADMIN — PANTOPRAZOLE SODIUM 40 MILLIGRAM(S): 20 TABLET, DELAYED RELEASE ORAL at 11:39

## 2022-01-12 RX ADMIN — Medication 650 MILLIGRAM(S): at 22:00

## 2022-01-12 RX ADMIN — SODIUM CHLORIDE 3 MILLILITER(S): 9 INJECTION INTRAMUSCULAR; INTRAVENOUS; SUBCUTANEOUS at 11:40

## 2022-01-12 RX ADMIN — Medication 125 MILLIGRAM(S): at 05:53

## 2022-01-12 RX ADMIN — SODIUM CHLORIDE 3 MILLILITER(S): 9 INJECTION INTRAMUSCULAR; INTRAVENOUS; SUBCUTANEOUS at 23:16

## 2022-01-12 RX ADMIN — Medication 12.5 MILLIGRAM(S): at 17:14

## 2022-01-12 RX ADMIN — Medication 4 MILLILITER(S): at 05:58

## 2022-01-12 RX ADMIN — Medication 1 DROP(S): at 23:15

## 2022-01-12 RX ADMIN — SODIUM CHLORIDE 3 MILLILITER(S): 9 INJECTION INTRAMUSCULAR; INTRAVENOUS; SUBCUTANEOUS at 17:15

## 2022-01-12 NOTE — PROGRESS NOTE ADULT - PROBLEM SELECTOR PLAN 11
CT a/p with a 2.4 cm nodular soft tissue density in the bladder appears separate from the prostate gland, concerning for bladder lesion  - as per urology - findings concerning for bladder tumor vs prostate gland. cysto transurethral resection would require general anesthesia. recommended checking urine cytology to determine next steps which returned negative. will still need outpatient urology follow-up regardless once acute issues improve  - PSA normal, urine cytology negative for malignancy  - previously spoke to the patient's wife, Sandrita Bauer [phone]. Given the patient's underlying comorbid conditions coupled with his recent devastating neurologic events, other medical complications that ensued, and his poor neurologic and functional status at this time, it may be prudent to monitor his progress and see if he makes any meaningful recovery over the coming weeks to months to decide whether the benefit of pursuing any invasive work up for the bladder lesion outweighs the risk. Spouse seems to be in agreement.  - Will require eventual outpatient f/u with Urology

## 2022-01-12 NOTE — PROGRESS NOTE ADULT - ASSESSMENT
61 year old man with respiratory failure d/t ICH    1. ICH  -per neurosurgery  -s/p  shunt      2. Respiratory failure  - for tracheostomy, will require long term enteral nutrition  - s/p PEG, continue TF and use of abdominal binder  - banatrol added for loose stool    3. Enterobacter PNA  -s/p course of antibiotics     4. Anemia, no overt GI bleed. EGD unremarkable. Anemia of chronic disease  -hgb stable  -monitor for GI bleeding     5. Cdiff infection   -on vanco taper  -doubt active cdiff at this time    6. DVT on a/c  -apixaban 5mg resumed  -continue PPI     7. Pneumothorax  -per [facility]  Gastroenterology and Hepatology  [address] [place], [place]  Office: [phone]  Cell: [phone]

## 2022-01-12 NOTE — PROGRESS NOTE ADULT - SUBJECTIVE AND OBJECTIVE BOX
INTERVAL HPI/OVERNIGHT EVENTS:  Pt seen and examined  still with diarrhea, however improving    MEDICATIONS  (STANDING):  acetylcysteine 20%  Inhalation 4 milliLiter(s) Inhalation every 6 hours  albuterol/ipratropium for Nebulization 3 milliLiter(s) Nebulizer every 6 hours  apixaban 5 milliGRAM(s) Enteral Tube every 12 hours  artificial  tears Solution 1 Drop(s) Both EYES every 6 hours  aspirin  chewable 81 milliGRAM(s) Oral daily  atorvastatin 40 milliGRAM(s) Oral at bedtime  chlorhexidine 0.12% Liquid 15 milliLiter(s) Oral Mucosa two times a day  dextrose 50% Injectable 25 Gram(s) IV Push once  dextrose 50% Injectable 12.5 Gram(s) IV Push once  doxazosin 2 milliGRAM(s) Oral daily  insulin lispro (ADMELOG) corrective regimen sliding scale   SubCutaneous every 6 hours  insulin NPH human recombinant 12 Unit(s) SubCutaneous every 6 hours  metoprolol tartrate 12.5 milliGRAM(s) Oral two times a day  multivitamin 1 Tablet(s) Oral daily  pantoprazole  Injectable 40 milliGRAM(s) IV Push daily  sodium chloride 3%  Inhalation 3 milliLiter(s) Inhalation every 6 hours  vancomycin    Solution 125 milliGRAM(s) Oral three times a day    MEDICATIONS  (PRN):  acetaminophen    Suspension .. 650 milliGRAM(s) Enteral Tube every 6 hours PRN Temp greater or equal to 38C (100.4F), Mild Pain (1 - 3)      Allergies    penicillin (Other)    Intolerances        Review of Systems:  cannot obtain      Vital Signs Last 24 Hrs  Vital Signs Last 24 Hrs  T(C): 37 (12 Jan 2022 16:00), Max: 37 (12 Jan 2022 16:00)  T(F): 98.6 (12 Jan 2022 16:00), Max: 98.6 (12 Jan 2022 16:00)  HR: 98 (12 Jan 2022 16:00) (73 - 106)  BP: 110/71 (12 Jan 2022 16:00) (110/71 - 134/81)  BP(mean): --  RR: 18 (12 Jan 2022 16:00) (18 - 18)  SpO2: 100% (12 Jan 2022 16:00) (98% - 100%)    PHYSICAL EXAM:    GENERAL:   no distress  HEENT:  NC/AT,  conjunctivae anicteric, clear and pink, tracheostomy  NECK: supple, trachea midline  CHEST:  Full & symmetric excursion, no increased effort, breath sounds clear  ABDOMEN:  Soft, non-tender, non-distended, normoactive bowel sounds,  no masses , no hepatosplenomegaly, gastrostomy  EXTREMITIES:  no cyanosis,clubbing or edema  SKIN:  No rash, erythema, or, ecchymoses, no jaundice  NEURO:   non-focal, no asterixis  RECTAL: Deferred      LABS:                        7.8    9.46  )-----------( 348      ( 10 Nima 2022 08:42 )             25.0     01-10    134<L>  |  95<L>  |  24<H>  ----------------------------<  128<H>  4.4   |  29  |  0.50    Ca    9.0      10 Nima 2022 08:42            RADIOLOGY & ADDITIONAL TESTS:

## 2022-01-12 NOTE — PROGRESS NOTE ADULT - SUBJECTIVE AND OBJECTIVE BOX
[facility] Division of Hospital Medicine  Philippe Carr DO  Pager (KIMBERLY, 1H-6R): [phone]  Other Times:  [phone]    Patient is a 61y old  Male who presents with a chief complaint of Posterior fossa hemorrhage (11 Jan 2022 13:39)    SUBJECTIVE / OVERNIGHT EVENTS: No acute events overnight. Patient seen and examined at bedside this morning, patient unable to participate in review of systems due to clinical status.    ADDITIONAL REVIEW OF SYSTEMS: Patient unable to participate in review of systems due to clinical status.    MEDICATIONS  (STANDING):  acetylcysteine 20%  Inhalation 4 milliLiter(s) Inhalation every 6 hours  albuterol/ipratropium for Nebulization 3 milliLiter(s) Nebulizer every 6 hours  apixaban 5 milliGRAM(s) Enteral Tube every 12 hours  artificial  tears Solution 1 Drop(s) Both EYES every 6 hours  aspirin  chewable 81 milliGRAM(s) Oral daily  atorvastatin 40 milliGRAM(s) Oral at bedtime  chlorhexidine 0.12% Liquid 15 milliLiter(s) Oral Mucosa two times a day  dextrose 50% Injectable 25 Gram(s) IV Push once  dextrose 50% Injectable 12.5 Gram(s) IV Push once  doxazosin 2 milliGRAM(s) Oral daily  insulin lispro (ADMELOG) corrective regimen sliding scale   SubCutaneous every 6 hours  insulin NPH human recombinant 12 Unit(s) SubCutaneous every 6 hours  metoprolol tartrate 12.5 milliGRAM(s) Oral two times a day  multivitamin 1 Tablet(s) Oral daily  pantoprazole  Injectable 40 milliGRAM(s) IV Push daily  psyllium Powder 1 Packet(s) Oral daily  sodium chloride 3%  Inhalation 3 milliLiter(s) Inhalation every 6 hours    MEDICATIONS  (PRN):  acetaminophen    Suspension .. 650 milliGRAM(s) Enteral Tube every 6 hours PRN Temp greater or equal to 38C (100.4F), Mild Pain (1 - 3)      CAPILLARY BLOOD GLUCOSE      POCT Blood Glucose.: 181 mg/dL (12 Jan 2022 11:21)  POCT Blood Glucose.: 171 mg/dL (12 Jan 2022 05:34)  POCT Blood Glucose.: 169 mg/dL (11 Jan 2022 23:46)  POCT Blood Glucose.: 190 mg/dL (11 Jan 2022 17:32)    I&O's Summary    11 Jan 2022 07:01  -  12 Jan 2022 07:00  --------------------------------------------------------  IN: 0 mL / OUT: 1350 mL / NET: -1350 mL        PHYSICAL EXAM:  Vital Signs Last 24 Hrs  T(C): 36.7 (12 Jan 2022 08:09), Max: 36.9 (11 Jan 2022 15:47)  T(F): 98 (12 Jan 2022 08:09), Max: 98.4 (11 Jan 2022 15:47)  HR: 10 (12 Jan 2022 08:09) (10 - 107)  BP: 133/74 (12 Jan 2022 08:09) (120/70 - 134/81)  BP(mean): --  RR: 18 (12 Jan 2022 08:09) (18 - 18)  SpO2: 98% (12 Jan 2022 08:09) (98% - 100%)    CONSTITUTIONAL: Chronically ill appearing male laying in bed in NAD  EYES: Opening eyes, clear sclera  NECK: +Trach  RESPIRATORY: Normal respiratory effort; rhonchi bilaterally  CARDIOVASCULAR: Regular rate and rhythm, normal S1 and S2, no murmur/rub/gallop; No lower extremity edema  ABDOMEN: Nontender to palpation, normoactive bowel sounds, no rebound/guarding, +PEG, + rectal tube  MUSCULOSKELETAL: No clubbing or cyanosis of digits; no joint swelling or tenderness to palpation  PSYCH: Awake, not withdrawing to pain  NEUROLOGY: Not following commands  SKIN: No rashes; no palpable lesions    LABS:                        7.6    10.97 )-----------( 368      ( 12 Jan 2022 07:20 )             24.7     01-12    135  |  95<L>  |  17  ----------------------------<  149<H>  4.3   |  31  |  0.51    Ca    8.9      12 Jan 2022 07:20  Phos  3.4     01-12  Mg     2.0     01-12

## 2022-01-13 LAB
ANION GAP SERPL CALC-SCNC: 9 MMOL/L — SIGNIFICANT CHANGE UP (ref 5–17)
BLD GP AB SCN SERPL QL: NEGATIVE — SIGNIFICANT CHANGE UP
BUN SERPL-MCNC: 18 MG/DL — SIGNIFICANT CHANGE UP (ref 7–23)
CALCIUM SERPL-MCNC: 9.1 MG/DL — SIGNIFICANT CHANGE UP (ref 8.4–10.5)
CHLORIDE SERPL-SCNC: 94 MMOL/L — LOW (ref 96–108)
CO2 SERPL-SCNC: 31 MMOL/L — SIGNIFICANT CHANGE UP (ref 22–31)
CREAT SERPL-MCNC: 0.54 MG/DL — SIGNIFICANT CHANGE UP (ref 0.5–1.3)
GLUCOSE BLDC GLUCOMTR-MCNC: 149 MG/DL — HIGH (ref 70–99)
GLUCOSE BLDC GLUCOMTR-MCNC: 158 MG/DL — HIGH (ref 70–99)
GLUCOSE BLDC GLUCOMTR-MCNC: 161 MG/DL — HIGH (ref 70–99)
GLUCOSE SERPL-MCNC: 139 MG/DL — HIGH (ref 70–99)
HCT VFR BLD CALC: 24.9 % — LOW (ref 39–50)
HGB BLD-MCNC: 7.7 G/DL — LOW (ref 13–17)
MAGNESIUM SERPL-MCNC: 2.1 MG/DL — SIGNIFICANT CHANGE UP (ref 1.6–2.6)
MCHC RBC-ENTMCNC: 28.5 PG — SIGNIFICANT CHANGE UP (ref 27–34)
MCHC RBC-ENTMCNC: 30.9 GM/DL — LOW (ref 32–36)
MCV RBC AUTO: 92.2 FL — SIGNIFICANT CHANGE UP (ref 80–100)
NRBC # BLD: 0 /100 WBCS — SIGNIFICANT CHANGE UP (ref 0–0)
PHOSPHATE SERPL-MCNC: 4.3 MG/DL — SIGNIFICANT CHANGE UP (ref 2.5–4.5)
PLATELET # BLD AUTO: 383 K/UL — SIGNIFICANT CHANGE UP (ref 150–400)
POTASSIUM SERPL-MCNC: 4.3 MMOL/L — SIGNIFICANT CHANGE UP (ref 3.5–5.3)
POTASSIUM SERPL-SCNC: 4.3 MMOL/L — SIGNIFICANT CHANGE UP (ref 3.5–5.3)
RBC # BLD: 2.7 M/UL — LOW (ref 4.2–5.8)
RBC # FLD: 13.6 % — SIGNIFICANT CHANGE UP (ref 10.3–14.5)
RH IG SCN BLD-IMP: POSITIVE — SIGNIFICANT CHANGE UP
SODIUM SERPL-SCNC: 134 MMOL/L — LOW (ref 135–145)
WBC # BLD: 8.97 K/UL — SIGNIFICANT CHANGE UP (ref 3.8–10.5)
WBC # FLD AUTO: 8.97 K/UL — SIGNIFICANT CHANGE UP (ref 3.8–10.5)

## 2022-01-13 PROCEDURE — 99232 SBSQ HOSP IP/OBS MODERATE 35: CPT

## 2022-01-13 RX ADMIN — PANTOPRAZOLE SODIUM 40 MILLIGRAM(S): 20 TABLET, DELAYED RELEASE ORAL at 11:53

## 2022-01-13 RX ADMIN — HUMAN INSULIN 12 UNIT(S): 100 INJECTION, SUSPENSION SUBCUTANEOUS at 11:52

## 2022-01-13 RX ADMIN — Medication 2: at 06:08

## 2022-01-13 RX ADMIN — Medication 1 PACKET(S): at 13:41

## 2022-01-13 RX ADMIN — SODIUM CHLORIDE 3 MILLILITER(S): 9 INJECTION INTRAMUSCULAR; INTRAVENOUS; SUBCUTANEOUS at 06:10

## 2022-01-13 RX ADMIN — Medication 1 DROP(S): at 11:55

## 2022-01-13 RX ADMIN — Medication 3 MILLILITER(S): at 17:24

## 2022-01-13 RX ADMIN — Medication 12.5 MILLIGRAM(S): at 17:24

## 2022-01-13 RX ADMIN — Medication 1 DROP(S): at 17:25

## 2022-01-13 RX ADMIN — Medication 2 MILLIGRAM(S): at 06:16

## 2022-01-13 RX ADMIN — Medication 125 MILLIGRAM(S): at 17:27

## 2022-01-13 RX ADMIN — APIXABAN 5 MILLIGRAM(S): 2.5 TABLET, FILM COATED ORAL at 06:10

## 2022-01-13 RX ADMIN — Medication 12.5 MILLIGRAM(S): at 06:10

## 2022-01-13 RX ADMIN — Medication 1 TABLET(S): at 11:53

## 2022-01-13 RX ADMIN — SODIUM CHLORIDE 3 MILLILITER(S): 9 INJECTION INTRAMUSCULAR; INTRAVENOUS; SUBCUTANEOUS at 11:54

## 2022-01-13 RX ADMIN — SODIUM CHLORIDE 3 MILLILITER(S): 9 INJECTION INTRAMUSCULAR; INTRAVENOUS; SUBCUTANEOUS at 17:24

## 2022-01-13 RX ADMIN — CHLORHEXIDINE GLUCONATE 15 MILLILITER(S): 213 SOLUTION TOPICAL at 17:24

## 2022-01-13 RX ADMIN — Medication 4 MILLILITER(S): at 06:09

## 2022-01-13 RX ADMIN — Medication 2: at 11:53

## 2022-01-13 RX ADMIN — HUMAN INSULIN 12 UNIT(S): 100 INJECTION, SUSPENSION SUBCUTANEOUS at 06:08

## 2022-01-13 RX ADMIN — Medication 81 MILLIGRAM(S): at 11:54

## 2022-01-13 RX ADMIN — Medication 125 MILLIGRAM(S): at 06:06

## 2022-01-13 RX ADMIN — HUMAN INSULIN 12 UNIT(S): 100 INJECTION, SUSPENSION SUBCUTANEOUS at 17:26

## 2022-01-13 RX ADMIN — Medication 3 MILLILITER(S): at 11:54

## 2022-01-13 RX ADMIN — Medication 3 MILLILITER(S): at 06:06

## 2022-01-13 RX ADMIN — CHLORHEXIDINE GLUCONATE 15 MILLILITER(S): 213 SOLUTION TOPICAL at 06:08

## 2022-01-13 RX ADMIN — Medication 4 MILLILITER(S): at 11:54

## 2022-01-13 RX ADMIN — Medication 4 MILLILITER(S): at 17:25

## 2022-01-13 RX ADMIN — Medication 1 DROP(S): at 06:07

## 2022-01-13 RX ADMIN — APIXABAN 5 MILLIGRAM(S): 2.5 TABLET, FILM COATED ORAL at 17:26

## 2022-01-13 NOTE — PROGRESS NOTE ADULT - SUBJECTIVE AND OBJECTIVE BOX
Chief Complaint:  Patient is a 61y old  Male who presents with a chief complaint of Posterior fossa hemorrhage (12 Jan 2022 12:55)      Date of service 01-13-22 @ 11:34      Interval Events:   Patient seen and examined. No acute overnight events.     Hospital Medications:  acetaminophen    Suspension .. 650 milliGRAM(s) Enteral Tube every 6 hours PRN  acetylcysteine 20%  Inhalation 4 milliLiter(s) Inhalation every 6 hours  albuterol/ipratropium for Nebulization 3 milliLiter(s) Nebulizer every 6 hours  apixaban 5 milliGRAM(s) Enteral Tube every 12 hours  artificial  tears Solution 1 Drop(s) Both EYES every 6 hours  aspirin  chewable 81 milliGRAM(s) Oral daily  atorvastatin 40 milliGRAM(s) Oral at bedtime  chlorhexidine 0.12% Liquid 15 milliLiter(s) Oral Mucosa two times a day  dextrose 50% Injectable 25 Gram(s) IV Push once  dextrose 50% Injectable 12.5 Gram(s) IV Push once  doxazosin 2 milliGRAM(s) Oral daily  insulin lispro (ADMELOG) corrective regimen sliding scale   SubCutaneous every 6 hours  insulin NPH human recombinant 12 Unit(s) SubCutaneous every 6 hours  metoprolol tartrate 12.5 milliGRAM(s) Oral two times a day  multivitamin 1 Tablet(s) Oral daily  pantoprazole  Injectable 40 milliGRAM(s) IV Push daily  psyllium Powder 1 Packet(s) Oral daily  sodium chloride 3%  Inhalation 3 milliLiter(s) Inhalation every 6 hours  vancomycin    Solution 125 milliGRAM(s) Oral two times a day        Review of Systems:  unable to obtain    PHYSICAL EXAM:   Vital Signs:  Vital Signs Last 24 Hrs  T(C): 37.4 (13 Jan 2022 08:32), Max: 37.4 (13 Jan 2022 08:32)  T(F): 99.4 (13 Jan 2022 08:32), Max: 99.4 (13 Jan 2022 08:32)  HR: 111 (13 Jan 2022 08:32) (73 - 111)  BP: 137/76 (13 Jan 2022 08:32) (110/71 - 137/76)  BP(mean): --  RR: 18 (13 Jan 2022 08:32) (18 - 19)  SpO2: 100% (13 Jan 2022 08:32) (99% - 100%)  Daily     Daily       PHYSICAL EXAM:     GENERAL:  Appears stated age, well-groomed, well-nourished, no distress  HEENT:  NC/AT,  conjunctivae anicteric, clear and pink,   NECK: supple, trachea midline, +trach collar   CHEST:  Full & symmetric excursion, no increased effort, breath sounds clear  HEART:  Regular rhythm, no JVD  ABDOMEN:  Soft, non-tender, non-distended, normoactive bowel sounds,  no masses , no hepatosplenomegaly, +gastrostomy  EXTREMITIES:  no cyanosis,clubbing or edema  SKIN:  No rash, erythema, or, ecchymoses, no jaundice  NEURO:  non-focal, no asterixis  RECTAL: Deferred      LABS Personally reviewed by me:                        7.7    8.97  )-----------( 383      ( 13 Jan 2022 06:17 )             24.9     Mean Cell Volume: 92.2 fl (01-13-22 @ 06:17)    01-13    134<L>  |  94<L>  |  18  ----------------------------<  139<H>  4.3   |  31  |  0.54    Ca    9.1      13 Jan 2022 06:17  Phos  4.3     01-13  Mg     2.1     01-13                                    7.7    8.97  )-----------( 383      ( 13 Jan 2022 06:17 )             24.9                         7.6    10.97 )-----------( 368      ( 12 Jan 2022 07:20 )             24.7                         8.2    13.11 )-----------( 390      ( 11 Jan 2022 07:46 )             25.7       Imaging personally reviewed by me:             Chief Complaint:  Patient is a 61y old  Male who presents with a chief complaint of Posterior fossa hemorrhage (12 Jan 2022 12:55)      Date of service 01-13-22 @ 11:34      Interval Events:   Patient seen and examined. No acute overnight events.    Hospital Medications:  acetaminophen    Suspension .. 650 milliGRAM(s) Enteral Tube every 6 hours PRN  acetylcysteine 20%  Inhalation 4 milliLiter(s) Inhalation every 6 hours  albuterol/ipratropium for Nebulization 3 milliLiter(s) Nebulizer every 6 hours  apixaban 5 milliGRAM(s) Enteral Tube every 12 hours  artificial  tears Solution 1 Drop(s) Both EYES every 6 hours  aspirin  chewable 81 milliGRAM(s) Oral daily  atorvastatin 40 milliGRAM(s) Oral at bedtime  chlorhexidine 0.12% Liquid 15 milliLiter(s) Oral Mucosa two times a day  dextrose 50% Injectable 25 Gram(s) IV Push once  dextrose 50% Injectable 12.5 Gram(s) IV Push once  doxazosin 2 milliGRAM(s) Oral daily  insulin lispro (ADMELOG) corrective regimen sliding scale   SubCutaneous every 6 hours  insulin NPH human recombinant 12 Unit(s) SubCutaneous every 6 hours  metoprolol tartrate 12.5 milliGRAM(s) Oral two times a day  multivitamin 1 Tablet(s) Oral daily  pantoprazole  Injectable 40 milliGRAM(s) IV Push daily  psyllium Powder 1 Packet(s) Oral daily  sodium chloride 3%  Inhalation 3 milliLiter(s) Inhalation every 6 hours  vancomycin    Solution 125 milliGRAM(s) Oral two times a day        Review of Systems:  unable to obtain    PHYSICAL EXAM:   Vital Signs:  Vital Signs Last 24 Hrs  T(C): 37.4 (13 Jan 2022 08:32), Max: 37.4 (13 Jan 2022 08:32)  T(F): 99.4 (13 Jan 2022 08:32), Max: 99.4 (13 Jan 2022 08:32)  HR: 111 (13 Jan 2022 08:32) (73 - 111)  BP: 137/76 (13 Jan 2022 08:32) (110/71 - 137/76)  BP(mean): --  RR: 18 (13 Jan 2022 08:32) (18 - 19)  SpO2: 100% (13 Jan 2022 08:32) (99% - 100%)  Daily     Daily       PHYSICAL EXAM:     GENERAL:  Appears stated age, well-groomed, well-nourished, no distress  HEENT:  NC/AT,  conjunctivae anicteric, clear and pink,   NECK: supple, trachea midline, +trach collar   CHEST:  Full & symmetric excursion, no increased effort, breath sounds clear  HEART:  Regular rhythm, no JVD  ABDOMEN:  Soft, non-tender, non-distended, normoactive bowel sounds,  no masses , no hepatosplenomegaly, +gastrostomy  EXTREMITIES:  no cyanosis,clubbing or edema  SKIN:  No rash, erythema, or, ecchymoses, no jaundice  NEURO:  non-focal, no asterixis  RECTAL: Deferred      LABS Personally reviewed by me:                        7.7    8.97  )-----------( 383      ( 13 Jan 2022 06:17 )             24.9     Mean Cell Volume: 92.2 fl (01-13-22 @ 06:17)    01-13    134<L>  |  94<L>  |  18  ----------------------------<  139<H>  4.3   |  31  |  0.54    Ca    9.1      13 Jan 2022 06:17  Phos  4.3     01-13  Mg     2.1     01-13                                    7.7    8.97  )-----------( 383      ( 13 Jan 2022 06:17 )             24.9                         7.6    10.97 )-----------( 368      ( 12 Jan 2022 07:20 )             24.7                         8.2    13.11 )-----------( 390      ( 11 Jan 2022 07:46 )             25.7       Imaging personally reviewed by me:

## 2022-01-13 NOTE — PROGRESS NOTE ADULT - SUBJECTIVE AND OBJECTIVE BOX
CC: f/u for cdif    Patient reports  nothing  REVIEW OF SYSTEMS:  All other review of systems negative (Comprehensive ROS)    Antimicrobials Day #  :2/7  vancomycin    Solution 125 milliGRAM(s) Oral two times a day    Other Medications Reviewed    T(F): 97.9 (01-13-22 @ 20:34), Max: 99.4 (01-13-22 @ 08:32)  HR: 93 (01-13-22 @ 20:34)  BP: 151/88 (01-13-22 @ 20:34)  RR: 18 (01-13-22 @ 20:34)  SpO2: 100% (01-13-22 @ 20:34)  Wt(kg): --    PHYSICAL EXAM:  General: poorly responsive , no acute distress  Eyes:  anicteric, no conjunctival injection, no discharge  Oropharynx: no lesions or injection 	  Neck: supple, without adenopathy  Lungs: rhonchito auscultation  Heart: regular rate and rhythm; no murmur, rubs or gallops  Abdomen: soft, nondistended, nontender, without mass or organomegaly  Skin: no lesions  Extremities: no clubbing, cyanosis, or edema  Neurologic:: poorly responsvie    LAB RESULTS:                        7.7    8.97  )-----------( 383      ( 13 Jan 2022 06:17 )             24.9     01-13    134<L>  |  94<L>  |  18  ----------------------------<  139<H>  4.3   |  31  |  0.54    Ca    9.1      13 Jan 2022 06:17  Phos  4.3     01-13  Mg     2.1     01-13          MICROBIOLOGY:  RECENT CULTURES:      RADIOLOGY REVIEWED:              Assessment:  Patient s/p soc for cerebellar bleed, pica aneurysm resected, peg , trach, tx for cdif and pneumonia now on vanco taper for cdif relapse.   Plan:  complete enteral vanco taper as previously outlined.  please call us if further input is needed

## 2022-01-13 NOTE — PROGRESS NOTE ADULT - SUBJECTIVE AND OBJECTIVE BOX
[facility] Division of Hospital Medicine  Philippe Carr DO  Pager (KIMBERLY, 0S-3F): [phone]  Other Times:  [phone]    Patient is a 61y old  Male who presents with a chief complaint of Posterior fossa hemorrhage (12 Jan 2022 12:55)    SUBJECTIVE / OVERNIGHT EVENTS: No acute events overnight. Patient seen and examined at bedside this morning, patient unable to participate in review of systems due to clinical status.    ADDITIONAL REVIEW OF SYSTEMS: Patient unable to participate in review of systems due to clinical status.    MEDICATIONS  (STANDING):  acetylcysteine 20%  Inhalation 4 milliLiter(s) Inhalation every 6 hours  albuterol/ipratropium for Nebulization 3 milliLiter(s) Nebulizer every 6 hours  apixaban 5 milliGRAM(s) Enteral Tube every 12 hours  artificial  tears Solution 1 Drop(s) Both EYES every 6 hours  aspirin  chewable 81 milliGRAM(s) Oral daily  atorvastatin 40 milliGRAM(s) Oral at bedtime  chlorhexidine 0.12% Liquid 15 milliLiter(s) Oral Mucosa two times a day  dextrose 50% Injectable 25 Gram(s) IV Push once  dextrose 50% Injectable 12.5 Gram(s) IV Push once  doxazosin 2 milliGRAM(s) Oral daily  insulin lispro (ADMELOG) corrective regimen sliding scale   SubCutaneous every 6 hours  insulin NPH human recombinant 12 Unit(s) SubCutaneous every 6 hours  metoprolol tartrate 12.5 milliGRAM(s) Oral two times a day  multivitamin 1 Tablet(s) Oral daily  pantoprazole  Injectable 40 milliGRAM(s) IV Push daily  psyllium Powder 1 Packet(s) Oral daily  sodium chloride 3%  Inhalation 3 milliLiter(s) Inhalation every 6 hours  vancomycin    Solution 125 milliGRAM(s) Oral two times a day    MEDICATIONS  (PRN):  acetaminophen    Suspension .. 650 milliGRAM(s) Enteral Tube every 6 hours PRN Temp greater or equal to 38C (100.4F), Mild Pain (1 - 3)      CAPILLARY BLOOD GLUCOSE      POCT Blood Glucose.: 161 mg/dL (13 Jan 2022 11:17)  POCT Blood Glucose.: 158 mg/dL (13 Jan 2022 06:05)  POCT Blood Glucose.: 187 mg/dL (12 Jan 2022 23:10)  POCT Blood Glucose.: 123 mg/dL (12 Jan 2022 17:03)    I&O's Summary    12 Jan 2022 07:01  -  13 Jan 2022 07:00  --------------------------------------------------------  IN: 825 mL / OUT: 1800 mL / NET: -975 mL        PHYSICAL EXAM:  Vital Signs Last 24 Hrs  T(C): 37.3 (13 Jan 2022 11:39), Max: 37.4 (13 Jan 2022 08:32)  T(F): 99.1 (13 Jan 2022 11:39), Max: 99.4 (13 Jan 2022 08:32)  HR: 105 (13 Jan 2022 11:39) (97 - 111)  BP: 123/70 (13 Jan 2022 11:39) (110/71 - 137/76)  BP(mean): --  RR: 18 (13 Jan 2022 11:39) (18 - 19)  SpO2: 99% (13 Jan 2022 11:39) (99% - 100%)    CONSTITUTIONAL: Chronically ill appearing male laying in bed in NAD  EYES: Opening eyes, clear sclera  NECK: +Trach  RESPIRATORY: Normal respiratory effort; rhonchi bilaterally  CARDIOVASCULAR: Regular rate and rhythm, normal S1 and S2, no murmur/rub/gallop; No lower extremity edema  ABDOMEN: Nontender to palpation, normoactive bowel sounds, no rebound/guarding, +PEG, + rectal tube  MUSCULOSKELETAL: No clubbing or cyanosis of digits; no joint swelling or tenderness to palpation  PSYCH: Awake, alert, unable to assess orientation as patient nonverbal  NEUROLOGY: Able to make fist with both hands, infrequently able to follow simple commands  SKIN: No rashes; no palpable lesions    LABS:                        7.7    8.97  )-----------( 383      ( 13 Jan 2022 06:17 )             24.9     01-13    134<L>  |  94<L>  |  18  ----------------------------<  139<H>  4.3   |  31  |  0.54    Ca    9.1      13 Jan 2022 06:17  Phos  4.3     01-13  Mg     2.1     01-13

## 2022-01-13 NOTE — PROGRESS NOTE ADULT - PROBLEM SELECTOR PLAN 11
CT a/p with a 2.4 cm nodular soft tissue density in the bladder appears separate from the prostate gland, concerning for bladder lesion  - as per urology - findings concerning for bladder tumor vs prostate gland. cysto transurethral resection would require general anesthesia. recommended checking urine cytology to determine next steps which returned negative. will still need outpatient urology follow-up regardless once acute issues improve  - PSA normal, urine cytology negative for malignancy  - previously spoke to the patient's wife, Sandrita Bauer [phone]. Given the patient's underlying comorbid conditions coupled with his recent devastating neurologic events, other medical complications that ensued, and his poor neurologic and functional status at this time, it may be prudent to monitor his progress and see if he makes any meaningful recovery over the coming weeks to months to decide whether the benefit of pursuing any invasive work up for the bladder lesion outweighs the risk. Spouse seems to be in agreement.  - Will require eventual outpatient f/u with Urology

## 2022-01-14 LAB
GLUCOSE BLDC GLUCOMTR-MCNC: 117 MG/DL — HIGH (ref 70–99)
GLUCOSE BLDC GLUCOMTR-MCNC: 148 MG/DL — HIGH (ref 70–99)
GLUCOSE BLDC GLUCOMTR-MCNC: 158 MG/DL — HIGH (ref 70–99)
GLUCOSE BLDC GLUCOMTR-MCNC: 182 MG/DL — HIGH (ref 70–99)
GLUCOSE BLDC GLUCOMTR-MCNC: 80 MG/DL — SIGNIFICANT CHANGE UP (ref 70–99)

## 2022-01-14 PROCEDURE — 99232 SBSQ HOSP IP/OBS MODERATE 35: CPT

## 2022-01-14 RX ADMIN — Medication 1 PACKET(S): at 12:00

## 2022-01-14 RX ADMIN — ATORVASTATIN CALCIUM 40 MILLIGRAM(S): 80 TABLET, FILM COATED ORAL at 00:50

## 2022-01-14 RX ADMIN — Medication 2: at 00:53

## 2022-01-14 RX ADMIN — Medication 125 MILLIGRAM(S): at 17:23

## 2022-01-14 RX ADMIN — Medication 81 MILLIGRAM(S): at 13:28

## 2022-01-14 RX ADMIN — SODIUM CHLORIDE 3 MILLILITER(S): 9 INJECTION INTRAMUSCULAR; INTRAVENOUS; SUBCUTANEOUS at 00:53

## 2022-01-14 RX ADMIN — Medication 3 MILLILITER(S): at 00:53

## 2022-01-14 RX ADMIN — Medication 2: at 17:39

## 2022-01-14 RX ADMIN — HUMAN INSULIN 12 UNIT(S): 100 INJECTION, SUSPENSION SUBCUTANEOUS at 00:54

## 2022-01-14 RX ADMIN — ATORVASTATIN CALCIUM 40 MILLIGRAM(S): 80 TABLET, FILM COATED ORAL at 21:10

## 2022-01-14 RX ADMIN — Medication 12.5 MILLIGRAM(S): at 17:21

## 2022-01-14 RX ADMIN — SODIUM CHLORIDE 3 MILLILITER(S): 9 INJECTION INTRAMUSCULAR; INTRAVENOUS; SUBCUTANEOUS at 17:21

## 2022-01-14 RX ADMIN — Medication 2 MILLIGRAM(S): at 06:19

## 2022-01-14 RX ADMIN — Medication 12.5 MILLIGRAM(S): at 06:19

## 2022-01-14 RX ADMIN — HUMAN INSULIN 12 UNIT(S): 100 INJECTION, SUSPENSION SUBCUTANEOUS at 13:28

## 2022-01-14 RX ADMIN — HUMAN INSULIN 12 UNIT(S): 100 INJECTION, SUSPENSION SUBCUTANEOUS at 06:18

## 2022-01-14 RX ADMIN — HUMAN INSULIN 12 UNIT(S): 100 INJECTION, SUSPENSION SUBCUTANEOUS at 17:20

## 2022-01-14 RX ADMIN — CHLORHEXIDINE GLUCONATE 15 MILLILITER(S): 213 SOLUTION TOPICAL at 17:23

## 2022-01-14 RX ADMIN — Medication 4 MILLILITER(S): at 06:21

## 2022-01-14 RX ADMIN — APIXABAN 5 MILLIGRAM(S): 2.5 TABLET, FILM COATED ORAL at 17:22

## 2022-01-14 RX ADMIN — CHLORHEXIDINE GLUCONATE 15 MILLILITER(S): 213 SOLUTION TOPICAL at 06:21

## 2022-01-14 RX ADMIN — Medication 1 DROP(S): at 00:54

## 2022-01-14 RX ADMIN — PANTOPRAZOLE SODIUM 40 MILLIGRAM(S): 20 TABLET, DELAYED RELEASE ORAL at 13:27

## 2022-01-14 RX ADMIN — SODIUM CHLORIDE 3 MILLILITER(S): 9 INJECTION INTRAMUSCULAR; INTRAVENOUS; SUBCUTANEOUS at 06:21

## 2022-01-14 RX ADMIN — Medication 4 MILLILITER(S): at 17:22

## 2022-01-14 RX ADMIN — Medication 4 MILLILITER(S): at 00:54

## 2022-01-14 RX ADMIN — Medication 3 MILLILITER(S): at 06:20

## 2022-01-14 RX ADMIN — Medication 3 MILLILITER(S): at 17:22

## 2022-01-14 RX ADMIN — Medication 1 DROP(S): at 17:22

## 2022-01-14 RX ADMIN — Medication 3 MILLILITER(S): at 13:26

## 2022-01-14 RX ADMIN — APIXABAN 5 MILLIGRAM(S): 2.5 TABLET, FILM COATED ORAL at 06:19

## 2022-01-14 RX ADMIN — SODIUM CHLORIDE 3 MILLILITER(S): 9 INJECTION INTRAMUSCULAR; INTRAVENOUS; SUBCUTANEOUS at 13:26

## 2022-01-14 RX ADMIN — Medication 650 MILLIGRAM(S): at 00:55

## 2022-01-14 RX ADMIN — Medication 4 MILLILITER(S): at 13:27

## 2022-01-14 RX ADMIN — Medication 650 MILLIGRAM(S): at 01:14

## 2022-01-14 RX ADMIN — Medication 125 MILLIGRAM(S): at 06:20

## 2022-01-14 RX ADMIN — Medication 1 TABLET(S): at 13:28

## 2022-01-14 RX ADMIN — Medication 1 DROP(S): at 06:18

## 2022-01-14 NOTE — PROGRESS NOTE ADULT - SUBJECTIVE AND OBJECTIVE BOX
[facility] Division of Hospital Medicine  Philippe Carr DO  Pager (KIMBERLY, 1T-8C): [phone]  Other Times:  [phone]    Patient is a 61y old  Male who presents with a chief complaint of Posterior fossa hemorrhage (13 Jan 2022 13:31)      SUBJECTIVE / OVERNIGHT EVENTS:  ADDITIONAL REVIEW OF SYSTEMS:    MEDICATIONS  (STANDING):  acetylcysteine 20%  Inhalation 4 milliLiter(s) Inhalation every 6 hours  albuterol/ipratropium for Nebulization 3 milliLiter(s) Nebulizer every 6 hours  apixaban 5 milliGRAM(s) Enteral Tube every 12 hours  artificial  tears Solution 1 Drop(s) Both EYES every 6 hours  aspirin  chewable 81 milliGRAM(s) Oral daily  atorvastatin 40 milliGRAM(s) Oral at bedtime  chlorhexidine 0.12% Liquid 15 milliLiter(s) Oral Mucosa two times a day  dextrose 50% Injectable 25 Gram(s) IV Push once  dextrose 50% Injectable 12.5 Gram(s) IV Push once  doxazosin 2 milliGRAM(s) Oral daily  insulin lispro (ADMELOG) corrective regimen sliding scale   SubCutaneous every 6 hours  insulin NPH human recombinant 12 Unit(s) SubCutaneous every 6 hours  metoprolol tartrate 12.5 milliGRAM(s) Oral two times a day  multivitamin 1 Tablet(s) Oral daily  pantoprazole  Injectable 40 milliGRAM(s) IV Push daily  psyllium Powder 1 Packet(s) Oral daily  sodium chloride 3%  Inhalation 3 milliLiter(s) Inhalation every 6 hours  vancomycin    Solution 125 milliGRAM(s) Oral two times a day    MEDICATIONS  (PRN):  acetaminophen    Suspension .. 650 milliGRAM(s) Enteral Tube every 6 hours PRN Temp greater or equal to 38C (100.4F), Mild Pain (1 - 3)      CAPILLARY BLOOD GLUCOSE      POCT Blood Glucose.: 148 mg/dL (14 Jan 2022 13:24)  POCT Blood Glucose.: 117 mg/dL (14 Jan 2022 06:11)  POCT Blood Glucose.: 158 mg/dL (14 Jan 2022 00:13)  POCT Blood Glucose.: 149 mg/dL (13 Jan 2022 17:19)    I&O's Summary    13 Jan 2022 07:01  -  14 Jan 2022 07:00  --------------------------------------------------------  IN: 650 mL / OUT: 1950 mL / NET: -1300 mL        PHYSICAL EXAM:  Vital Signs Last 24 Hrs  T(C): 36.9 (14 Jan 2022 11:24), Max: 36.9 (14 Jan 2022 11:24)  T(F): 98.5 (14 Jan 2022 11:24), Max: 98.5 (14 Jan 2022 11:24)  HR: 104 (14 Jan 2022 11:24) (87 - 111)  BP: 136/84 (14 Jan 2022 11:24) (93/58 - 155/83)  BP(mean): --  RR: 18 (14 Jan 2022 11:24) (18 - 19)  SpO2: 99% (14 Jan 2022 11:24) (99% - 100%)  CONSTITUTIONAL: NAD, well-developed, well-groomed  EYES: PERRLA; conjunctiva and sclera clear  ENMT: Moist oral mucosa, no pharyngeal injection or exudates; normal dentition  NECK: Supple, no palpable masses; no thyromegaly  RESPIRATORY: Normal respiratory effort; lungs are clear to auscultation bilaterally  CARDIOVASCULAR: Regular rate and rhythm, normal S1 and S2, no murmur/rub/gallop; No lower extremity edema; Peripheral pulses are 2+ bilaterally  ABDOMEN: Nontender to palpation, normoactive bowel sounds, no rebound/guarding; No hepatosplenomegaly  MUSCULOSKELETAL:  Normal gait; no clubbing or cyanosis of digits; no joint swelling or tenderness to palpation  PSYCH: A+O to person, place, and time; affect appropriate  NEUROLOGY: CN 2-12 are intact and symmetric; no gross sensory deficits   SKIN: No rashes; no palpable lesions    LABS:                        7.7    8.97  )-----------( 383      ( 13 Jan 2022 06:17 )             24.9     01-13    134<L>  |  94<L>  |  18  ----------------------------<  139<H>  4.3   |  31  |  0.54    Ca    9.1      13 Jan 2022 06:17  Phos  4.3     01-13  Mg     2.1     01-13                  RADIOLOGY & ADDITIONAL TESTS:  Results Reviewed:   Imaging Personally Reviewed:  Electrocardiogram Personally Reviewed:    COORDINATION OF CARE:  Care Discussed with Consultants/Other Providers [Y/N]:  Prior or Outpatient Records Reviewed [Y/N]:   [facility] Division of Hospital Medicine  Philippe Carr DO  Pager (KIMBERLY, 4G-3K): [phone]  Other Times:  [phone]    Patient is a 61y old  Male who presents with a chief complaint of Posterior fossa hemorrhage (13 Jan 2022 13:31)    SUBJECTIVE / OVERNIGHT EVENTS: No acute events overnight. Patient seen and examined at bedside this morning, patient unable to participate in review of systems due to clinical status.    ADDITIONAL REVIEW OF SYSTEMS: Patient unable to participate in review of systems due to clinical status.    MEDICATIONS  (STANDING):  acetylcysteine 20%  Inhalation 4 milliLiter(s) Inhalation every 6 hours  albuterol/ipratropium for Nebulization 3 milliLiter(s) Nebulizer every 6 hours  apixaban 5 milliGRAM(s) Enteral Tube every 12 hours  artificial  tears Solution 1 Drop(s) Both EYES every 6 hours  aspirin  chewable 81 milliGRAM(s) Oral daily  atorvastatin 40 milliGRAM(s) Oral at bedtime  chlorhexidine 0.12% Liquid 15 milliLiter(s) Oral Mucosa two times a day  dextrose 50% Injectable 25 Gram(s) IV Push once  dextrose 50% Injectable 12.5 Gram(s) IV Push once  doxazosin 2 milliGRAM(s) Oral daily  insulin lispro (ADMELOG) corrective regimen sliding scale   SubCutaneous every 6 hours  insulin NPH human recombinant 12 Unit(s) SubCutaneous every 6 hours  metoprolol tartrate 12.5 milliGRAM(s) Oral two times a day  multivitamin 1 Tablet(s) Oral daily  pantoprazole  Injectable 40 milliGRAM(s) IV Push daily  psyllium Powder 1 Packet(s) Oral daily  sodium chloride 3%  Inhalation 3 milliLiter(s) Inhalation every 6 hours  vancomycin    Solution 125 milliGRAM(s) Oral two times a day    MEDICATIONS  (PRN):  acetaminophen    Suspension .. 650 milliGRAM(s) Enteral Tube every 6 hours PRN Temp greater or equal to 38C (100.4F), Mild Pain (1 - 3)      CAPILLARY BLOOD GLUCOSE      POCT Blood Glucose.: 148 mg/dL (14 Jan 2022 13:24)  POCT Blood Glucose.: 117 mg/dL (14 Jan 2022 06:11)  POCT Blood Glucose.: 158 mg/dL (14 Jan 2022 00:13)  POCT Blood Glucose.: 149 mg/dL (13 Jan 2022 17:19)    I&O's Summary    13 Jan 2022 07:01  -  14 Jan 2022 07:00  --------------------------------------------------------  IN: 650 mL / OUT: 1950 mL / NET: -1300 mL        PHYSICAL EXAM:  Vital Signs Last 24 Hrs  T(C): 36.9 (14 Jan 2022 11:24), Max: 36.9 (14 Jan 2022 11:24)  T(F): 98.5 (14 Jan 2022 11:24), Max: 98.5 (14 Jan 2022 11:24)  HR: 104 (14 Jan 2022 11:24) (87 - 111)  BP: 136/84 (14 Jan 2022 11:24) (93/58 - 155/83)  BP(mean): --  RR: 18 (14 Jan 2022 11:24) (18 - 19)  SpO2: 99% (14 Jan 2022 11:24) (99% - 100%)    CONSTITUTIONAL: Chronically ill appearing male laying in bed in NAD  EYES: Opening eyes, clear sclera  NECK: +Trach  RESPIRATORY: Normal respiratory effort; rhonchi bilaterally  CARDIOVASCULAR: Regular rate and rhythm, normal S1 and S2, no murmur/rub/gallop; No lower extremity edema  ABDOMEN: Nontender to palpation, normoactive bowel sounds, no rebound/guarding, +PEG, + rectal tube  MUSCULOSKELETAL: No clubbing or cyanosis of digits; no joint swelling or tenderness to palpation  PSYCH: Awake, alert, unable to assess orientation as patient nonverbal  NEUROLOGY: Able to make fist with both hands, infrequently able to follow simple commands  SKIN: No rashes; no palpable lesions    LABS:                        7.7    8.97  )-----------( 383      ( 13 Jan 2022 06:17 )             24.9     01-13    134<L>  |  94<L>  |  18  ----------------------------<  139<H>  4.3   |  31  |  0.54    Ca    9.1      13 Jan 2022 06:17  Phos  4.3     01-13  Mg     2.1     01-13

## 2022-01-14 NOTE — PROGRESS NOTE ADULT - SUBJECTIVE AND OBJECTIVE BOX
Chief Complaint:  Patient is a 61y old  Male who presents with a chief complaint of Posterior fossa hemorrhage (13 Jan 2022 13:31)      Date of service 01-14-22 @ 11:24      Interval Events:  Patient seen and examined. No acute overnight events.     Hospital Medications:  acetaminophen    Suspension .. 650 milliGRAM(s) Enteral Tube every 6 hours PRN  acetylcysteine 20%  Inhalation 4 milliLiter(s) Inhalation every 6 hours  albuterol/ipratropium for Nebulization 3 milliLiter(s) Nebulizer every 6 hours  apixaban 5 milliGRAM(s) Enteral Tube every 12 hours  artificial  tears Solution 1 Drop(s) Both EYES every 6 hours  aspirin  chewable 81 milliGRAM(s) Oral daily  atorvastatin 40 milliGRAM(s) Oral at bedtime  chlorhexidine 0.12% Liquid 15 milliLiter(s) Oral Mucosa two times a day  dextrose 50% Injectable 25 Gram(s) IV Push once  dextrose 50% Injectable 12.5 Gram(s) IV Push once  doxazosin 2 milliGRAM(s) Oral daily  insulin lispro (ADMELOG) corrective regimen sliding scale   SubCutaneous every 6 hours  insulin NPH human recombinant 12 Unit(s) SubCutaneous every 6 hours  metoprolol tartrate 12.5 milliGRAM(s) Oral two times a day  multivitamin 1 Tablet(s) Oral daily  pantoprazole  Injectable 40 milliGRAM(s) IV Push daily  psyllium Powder 1 Packet(s) Oral daily  sodium chloride 3%  Inhalation 3 milliLiter(s) Inhalation every 6 hours  vancomycin    Solution 125 milliGRAM(s) Oral two times a day        Review of Systems:  unable to obtain    PHYSICAL EXAM:   Vital Signs:  Vital Signs Last 24 Hrs  T(C): 36.8 (14 Jan 2022 08:35), Max: 37.3 (13 Jan 2022 11:39)  T(F): 98.2 (14 Jan 2022 08:35), Max: 99.1 (13 Jan 2022 11:39)  HR: 105 (14 Jan 2022 08:50) (87 - 111)  BP: 93/58 (14 Jan 2022 08:35) (93/58 - 155/83)  BP(mean): --  RR: 19 (14 Jan 2022 08:50) (18 - 19)  SpO2: 99% (14 Jan 2022 08:50) (99% - 100%)  Daily     Daily       PHYSICAL EXAM:     GENERAL:  Appears stated age, well-groomed, well-nourished, no distress  HEENT:  NC/AT,  conjunctivae anicteric, clear and pink,   NECK: supple, trachea midline, +trach collar   CHEST:  Full & symmetric excursion, no increased effort, breath sounds clear  HEART:  Regular rhythm, no JVD  ABDOMEN:  Soft, non-tender, non-distended, normoactive bowel sounds,  no masses , no hepatosplenomegaly, +gastrostomy  EXTREMITIES:  no cyanosis,clubbing or edema  SKIN:  No rash, erythema, or, ecchymoses, no jaundice  NEURO:  non-focal, no asterixis  RECTAL: Deferred      LABS Personally reviewed by me:                        7.7    8.97  )-----------( 383      ( 13 Jan 2022 06:17 )             24.9       01-13    134<L>  |  94<L>  |  18  ----------------------------<  139<H>  4.3   |  31  |  0.54    Ca    9.1      13 Jan 2022 06:17  Phos  4.3     01-13  Mg     2.1     01-13                                    7.7    8.97  )-----------( 383      ( 13 Jan 2022 06:17 )             24.9                         7.6    10.97 )-----------( 368      ( 12 Jan 2022 07:20 )             24.7       Imaging personally reviewed by me:

## 2022-01-14 NOTE — PROGRESS NOTE ADULT - PROBLEM SELECTOR PLAN 11
CT a/p with a 2.4 cm nodular soft tissue density in the bladder appears separate from the prostate gland, concerning for bladder lesion  - as per urology - findings concerning for bladder tumor vs prostate gland. cysto transurethral resection would require general anesthesia. recommended checking urine cytology to determine next steps which returned negative. will still need outpatient urology follow-up regardless once acute issues improve  - PSA normal, urine cytology negative for malignancy  - previously spoke to the patient's wife, Sandrita Bauer [phone]. Given the patient's underlying comorbid conditions coupled with his recent devastating neurologic events, other medical complications that ensued, and his poor neurologic and functional status at this time, it may be prudent to monitor his progress and see if he makes any meaningful recovery over the coming weeks to months to decide whether the benefit of pursuing any invasive work up for the bladder lesion outweighs the risk. Spouse seems to be in agreement.  - Will require eventual outpatient f/u with Urology

## 2022-01-14 NOTE — PROGRESS NOTE ADULT - ASSESSMENT
61 year old man with respiratory failure d/t ICH    1. ICH  -per neurosurgery  -s/p  shunt    2. Respiratory failure  - for tracheostomy, will require long term enteral nutrition  - s/p PEG, continue TF and use of abdominal binder  - banatrol added for loose stool    3. Enterobacter PNA  -s/p course of antibiotics     4. Anemia, no overt GI bleed. EGD unremarkable. Anemia of chronic disease  -hgb stable  -monitor for GI bleeding     5. Cdiff infection   -rectal tube, ~100cc of loose brown stools noted   -on vanco taper  -doubt active cdiff at this time    6. DVT on a/c  -apixaban 5mg resumed  -continue PPI     7. Pneumothorax  -per CTS    Attending supervision statement: I have personally seen and examined the patient. I fully participated in the care of this patient. I have made amendments to the documentation where necessary, and agree with the history, physical exam, and plan as outlined by the ACP.      [facility]  Gastroenterology and Hepatology  [address] [place], [place]  Office: [phone]  Cell: [phone]

## 2022-01-15 LAB
GLUCOSE BLDC GLUCOMTR-MCNC: 107 MG/DL — HIGH (ref 70–99)
GLUCOSE BLDC GLUCOMTR-MCNC: 126 MG/DL — HIGH (ref 70–99)
GLUCOSE BLDC GLUCOMTR-MCNC: 129 MG/DL — HIGH (ref 70–99)
GLUCOSE BLDC GLUCOMTR-MCNC: 140 MG/DL — HIGH (ref 70–99)
GLUCOSE BLDC GLUCOMTR-MCNC: 140 MG/DL — HIGH (ref 70–99)
GLUCOSE BLDC GLUCOMTR-MCNC: 144 MG/DL — HIGH (ref 70–99)
GLUCOSE BLDC GLUCOMTR-MCNC: 144 MG/DL — HIGH (ref 70–99)

## 2022-01-15 PROCEDURE — 71045 X-RAY EXAM CHEST 1 VIEW: CPT | Mod: 26

## 2022-01-15 PROCEDURE — 99233 SBSQ HOSP IP/OBS HIGH 50: CPT | Mod: GC

## 2022-01-15 RX ORDER — CHOLESTYRAMINE 4 G/9G
4 POWDER, FOR SUSPENSION ORAL THREE TIMES A DAY
Refills: 0 | Status: DISCONTINUED | OUTPATIENT
Start: 2022-01-15 | End: 2022-01-20

## 2022-01-15 RX ORDER — SODIUM CHLORIDE 9 MG/ML
1000 INJECTION, SOLUTION INTRAVENOUS
Refills: 0 | Status: DISCONTINUED | OUTPATIENT
Start: 2022-01-15 | End: 2022-01-16

## 2022-01-15 RX ADMIN — SODIUM CHLORIDE 3 MILLILITER(S): 9 INJECTION INTRAMUSCULAR; INTRAVENOUS; SUBCUTANEOUS at 05:33

## 2022-01-15 RX ADMIN — Medication 1 PACKET(S): at 15:47

## 2022-01-15 RX ADMIN — SODIUM CHLORIDE 3 MILLILITER(S): 9 INJECTION INTRAMUSCULAR; INTRAVENOUS; SUBCUTANEOUS at 23:35

## 2022-01-15 RX ADMIN — SODIUM CHLORIDE 3 MILLILITER(S): 9 INJECTION INTRAMUSCULAR; INTRAVENOUS; SUBCUTANEOUS at 18:12

## 2022-01-15 RX ADMIN — SODIUM CHLORIDE 3 MILLILITER(S): 9 INJECTION INTRAMUSCULAR; INTRAVENOUS; SUBCUTANEOUS at 00:45

## 2022-01-15 RX ADMIN — Medication 1 DROP(S): at 14:46

## 2022-01-15 RX ADMIN — CHLORHEXIDINE GLUCONATE 15 MILLILITER(S): 213 SOLUTION TOPICAL at 17:24

## 2022-01-15 RX ADMIN — HUMAN INSULIN 12 UNIT(S): 100 INJECTION, SUSPENSION SUBCUTANEOUS at 15:52

## 2022-01-15 RX ADMIN — Medication 12.5 MILLIGRAM(S): at 05:35

## 2022-01-15 RX ADMIN — Medication 1 DROP(S): at 05:35

## 2022-01-15 RX ADMIN — Medication 2 MILLIGRAM(S): at 05:35

## 2022-01-15 RX ADMIN — Medication 1 DROP(S): at 00:46

## 2022-01-15 RX ADMIN — APIXABAN 5 MILLIGRAM(S): 2.5 TABLET, FILM COATED ORAL at 17:26

## 2022-01-15 RX ADMIN — Medication 4 MILLILITER(S): at 12:00

## 2022-01-15 RX ADMIN — CHLORHEXIDINE GLUCONATE 15 MILLILITER(S): 213 SOLUTION TOPICAL at 05:34

## 2022-01-15 RX ADMIN — Medication 4 MILLILITER(S): at 18:16

## 2022-01-15 RX ADMIN — SODIUM CHLORIDE 3 MILLILITER(S): 9 INJECTION INTRAMUSCULAR; INTRAVENOUS; SUBCUTANEOUS at 15:17

## 2022-01-15 RX ADMIN — Medication 3 MILLILITER(S): at 05:33

## 2022-01-15 RX ADMIN — Medication 3 MILLILITER(S): at 00:45

## 2022-01-15 RX ADMIN — Medication 1 DROP(S): at 17:25

## 2022-01-15 RX ADMIN — HUMAN INSULIN 12 UNIT(S): 100 INJECTION, SUSPENSION SUBCUTANEOUS at 23:35

## 2022-01-15 RX ADMIN — Medication 81 MILLIGRAM(S): at 15:15

## 2022-01-15 RX ADMIN — Medication 12.5 MILLIGRAM(S): at 18:15

## 2022-01-15 RX ADMIN — Medication 3 MILLILITER(S): at 17:24

## 2022-01-15 RX ADMIN — Medication 1 TABLET(S): at 15:15

## 2022-01-15 RX ADMIN — HUMAN INSULIN 12 UNIT(S): 100 INJECTION, SUSPENSION SUBCUTANEOUS at 18:49

## 2022-01-15 RX ADMIN — Medication 3 MILLILITER(S): at 23:34

## 2022-01-15 RX ADMIN — Medication 3 MILLILITER(S): at 15:16

## 2022-01-15 RX ADMIN — Medication 1 DROP(S): at 23:36

## 2022-01-15 RX ADMIN — PANTOPRAZOLE SODIUM 40 MILLIGRAM(S): 20 TABLET, DELAYED RELEASE ORAL at 15:14

## 2022-01-15 RX ADMIN — Medication 125 MILLIGRAM(S): at 18:16

## 2022-01-15 RX ADMIN — Medication 4 MILLILITER(S): at 05:35

## 2022-01-15 RX ADMIN — APIXABAN 5 MILLIGRAM(S): 2.5 TABLET, FILM COATED ORAL at 05:35

## 2022-01-15 RX ADMIN — Medication 4 MILLILITER(S): at 00:46

## 2022-01-15 RX ADMIN — Medication 125 MILLIGRAM(S): at 05:35

## 2022-01-15 RX ADMIN — ATORVASTATIN CALCIUM 40 MILLIGRAM(S): 80 TABLET, FILM COATED ORAL at 23:34

## 2022-01-15 NOTE — PROGRESS NOTE ADULT - ASSESSMENT
61 year old man with respiratory failure d/t ICH    1. ICH  -per neurosurgery  -s/p  shunt    2. Respiratory failure  - for tracheostomy, will require long term enteral nutrition  - s/p PEG, continue TF and use of abdominal binder  - banatrol added for loose stool    3. Enterobacter PNA  -s/p course of antibiotics     4. Anemia, no overt GI bleed. EGD unremarkable. Anemia of chronic disease  -hgb stable  -monitor for GI bleeding     5. Cdiff infection   -rectal tube, ~100cc of loose brown stools noted   -on vanco taper  -doubt active cdiff at this time    6. DVT on a/c  -apixaban 5mg resumed  -continue PPI     7. Pneumothorax  -per CTS    Attending supervision statement: I have personally seen and examined the patient. I fully participated in the care of this patient. I have made amendments to the documentation where necessary, and agree with the history, physical exam, and plan as outlined by the ACP.      [facility]  Gastroenterology and Hepatology  [address] [place], [place]  Office: [phone]  Cell: [phone]

## 2022-01-15 NOTE — PROGRESS NOTE ADULT - PROBLEM SELECTOR PLAN 11
CT a/p with a 2.4 cm nodular soft tissue density in the bladder appears separate from the prostate gland, concerning for bladder lesion  - as per urology - findings concerning for bladder tumor vs prostate gland. cysto transurethral resection would require general anesthesia. recommended checking urine cytology to determine next steps which returned negative. will still need outpatient urology follow-up regardless once acute issues improve  - PSA normal, urine cytology negative for malignancy  - previously spoke to the patient's wife, Sandrita Bauer [phone]. Given the patient's underlying comorbid conditions coupled with his recent devastating neurologic events, other medical complications that ensued, and his poor neurologic and functional status at this time, it may be prudent to monitor his progress and see if he makes any meaningful recovery over the coming weeks to months to decide whether the benefit of pursuing any invasive work up for the bladder lesion outweighs the risk. Spouse seems to be in agreement.  - Will require eventual outpatient f/u with Urology

## 2022-01-15 NOTE — PROGRESS NOTE ADULT - SUBJECTIVE AND OBJECTIVE BOX
Chief Complaint:  Patient is a 61y old  Male who presents with a chief complaint of Posterior fossa hemorrhage (13 Jan 2022 13:31)      Date of service 01-15-22 @ 11:24      Interval Events:  Patient seen and examined. No acute overnight events.     Hospital Medications:  acetaminophen    Suspension .. 650 milliGRAM(s) Enteral Tube every 6 hours PRN  acetylcysteine 20%  Inhalation 4 milliLiter(s) Inhalation every 6 hours  albuterol/ipratropium for Nebulization 3 milliLiter(s) Nebulizer every 6 hours  apixaban 5 milliGRAM(s) Enteral Tube every 12 hours  artificial  tears Solution 1 Drop(s) Both EYES every 6 hours  aspirin  chewable 81 milliGRAM(s) Oral daily  atorvastatin 40 milliGRAM(s) Oral at bedtime  chlorhexidine 0.12% Liquid 15 milliLiter(s) Oral Mucosa two times a day  dextrose 50% Injectable 25 Gram(s) IV Push once  dextrose 50% Injectable 12.5 Gram(s) IV Push once  doxazosin 2 milliGRAM(s) Oral daily  insulin lispro (ADMELOG) corrective regimen sliding scale   SubCutaneous every 6 hours  insulin NPH human recombinant 12 Unit(s) SubCutaneous every 6 hours  metoprolol tartrate 12.5 milliGRAM(s) Oral two times a day  multivitamin 1 Tablet(s) Oral daily  pantoprazole  Injectable 40 milliGRAM(s) IV Push daily  psyllium Powder 1 Packet(s) Oral daily  sodium chloride 3%  Inhalation 3 milliLiter(s) Inhalation every 6 hours  vancomycin    Solution 125 milliGRAM(s) Oral two times a day        Review of Systems:  unable to obtain    PHYSICAL EXAM:   Vital Signs Last 24 Hrs  T(C): 36.9 (15 Nima 2022 11:00), Max: 37.3 (14 Jan 2022 20:27)  T(F): 98.4 (15 Nima 2022 11:00), Max: 99.1 (14 Jan 2022 20:27)  HR: 96 (15 Nima 2022 11:00) (96 - 119)  BP: 126/77 (15 Nima 2022 11:00) (110/73 - 130/77)  BP(mean): --  RR: 18 (15 Nima 2022 11:00) (18 - 18)  SpO2: 98% (15 Nima 2022 11:00) (98% - 100%)      PHYSICAL EXAM:     GENERAL:  Appears stated age, well-groomed, well-nourished, no distress  HEENT:  NC/AT,  conjunctivae anicteric, clear and pink,   NECK: supple, trachea midline, +trach collar   CHEST:  Full & symmetric excursion, no increased effort, breath sounds clear  HEART:  Regular rhythm, no JVD  ABDOMEN:  Soft, non-tender, non-distended, normoactive bowel sounds,  no masses , no hepatosplenomegaly, +gastrostomy  EXTREMITIES:  no cyanosis,clubbing or edema  SKIN:  No rash, erythema, or, ecchymoses, no jaundice  NEURO:  non-focal, no asterixis  RECTAL: Deferred      LABS Personally reviewed by me:                        7.7    8.97  )-----------( 383      ( 13 Jan 2022 06:17 )             24.9       01-13    134<L>  |  94<L>  |  18  ----------------------------<  139<H>  4.3   |  31  |  0.54    Ca    9.1      13 Jan 2022 06:17  Phos  4.3     01-13  Mg     2.1     01-13                                    7.7    8.97  )-----------( 383      ( 13 Jan 2022 06:17 )             24.9                         7.6    10.97 )-----------( 368      ( 12 Jan 2022 07:20 )             24.7       Imaging personally reviewed by me:

## 2022-01-15 NOTE — PROGRESS NOTE ADULT - PROBLEM SELECTOR PLAN 1
No recent fevers, no leukocytosis, CT chest result noted: complete b/l lower lobe atelectasis and findings concerning for possible aspiration/infection  - patient with copious secretions, episode of hypoxia improved after suctioning  - ID f/u plan noted- completed IV Cefepime and flagyl for possible aspiration pneumonia 5/5 days on 1/8  - follow up blood cultures NGTD  - aspiration precaution  - on Vanco taper via PEG for c diff

## 2022-01-15 NOTE — CHART NOTE - NSCHARTNOTEFT_GEN_A_CORE
Medicine PA Note     Notified by RN that Medicine PA Note     Notified by RN that patient had suctioned copious secretions at bedside.    VSS at this time   Tube feeds currently held to r/o aspiration   Placed on D5 + 1/2 NS at 50ccs overnight   CXR ordered to r/o aspiration   Continue to monitor patient's vitals closely overnight   Endorse/sign out to day team on overnight events   RN aware of management     Raina Sosa PA-C   Dept of Medicine   [id number] Medicine PA Note     Notified by RN that patient had suctioned copious secretions at bedside.  Per RN, the copious amount of secretions resembled the feeds the patient was receiving through the PEG tube  VSS at this time   Tube feeds currently held to r/o aspiration   Placed on D5 + 1/2 NS at 50ccs overnight   CXR ordered to r/o aspiration   Continue to monitor patient's vitals closely overnight   Endorse/sign out to day team on overnight events   RN aware of management     Raina Sosa PA-C   Dept of Medicine   [id number]

## 2022-01-15 NOTE — PROGRESS NOTE ADULT - SUBJECTIVE AND OBJECTIVE BOX
Andre Reyes, M.D.  Pager: [phone]  Office: [phone]    Patient is a 61y old  Male who presents with a chief complaint of Posterior fossa hemorrhage (14 Jan 2022 13:32)          SUBJECTIVE / OVERNIGHT EVENTS:    Overnight tube feeds were turned off out of concern for aspiration    unable to get a ROS due to cognitive impairment      MEDICATIONS  (STANDING):  acetylcysteine 20%  Inhalation 4 milliLiter(s) Inhalation every 6 hours  albuterol/ipratropium for Nebulization 3 milliLiter(s) Nebulizer every 6 hours  apixaban 5 milliGRAM(s) Enteral Tube every 12 hours  artificial  tears Solution 1 Drop(s) Both EYES every 6 hours  aspirin  chewable 81 milliGRAM(s) Oral daily  atorvastatin 40 milliGRAM(s) Oral at bedtime  chlorhexidine 0.12% Liquid 15 milliLiter(s) Oral Mucosa two times a day  dextrose 5% + sodium chloride 0.45%. 1000 milliLiter(s) (50 mL/Hr) IV Continuous <Continuous>  dextrose 50% Injectable 25 Gram(s) IV Push once  dextrose 50% Injectable 12.5 Gram(s) IV Push once  doxazosin 2 milliGRAM(s) Oral daily  insulin lispro (ADMELOG) corrective regimen sliding scale   SubCutaneous every 6 hours  insulin NPH human recombinant 12 Unit(s) SubCutaneous every 6 hours  metoprolol tartrate 12.5 milliGRAM(s) Oral two times a day  multivitamin 1 Tablet(s) Oral daily  pantoprazole  Injectable 40 milliGRAM(s) IV Push daily  psyllium Powder 1 Packet(s) Oral daily  sodium chloride 3%  Inhalation 3 milliLiter(s) Inhalation every 6 hours  vancomycin    Solution 125 milliGRAM(s) Oral two times a day    MEDICATIONS  (PRN):  acetaminophen    Suspension .. 650 milliGRAM(s) Enteral Tube every 6 hours PRN Temp greater or equal to 38C (100.4F), Mild Pain (1 - 3)          T(C): 36.9 (01-15 @ 11:00), Max: 37.3 (01-14 @ 20:27)   HR: 96   BP: 126/77   RR: 18   SpO2: 98%    PHYSICAL EXAM:    CONSTITUTIONAL: Chronically ill appearing male laying in bed in NAD  EYES: Opening eyes, clear sclera  NECK: +Trach  RESPIRATORY: Normal respiratory effort; rhonchi bilaterally in upper lung fields  CARDIOVASCULAR: Regular rate and rhythm, normal S1 and S2, no murmur/rub/gallop; No lower extremity edema  ABDOMEN: Nontender to palpation, normoactive bowel sounds, no rebound/guarding, +PEG, + rectal tube  MUSCULOSKELETAL: No clubbing or cyanosis of digits; no joint swelling or tenderness to palpation  PSYCH: Awake, alert, unable to assess orientation as patient nonverbal  NEUROLOGY: Able to make fist with both hands, infrequently able to follow simple commands  SKIN: No rashes; no palpable lesions    LABS:              CAPILLARY BLOOD GLUCOSE      POCT Blood Glucose.: 126 mg/dL (15 Nima 2022 11:33)  POCT Blood Glucose.: 107 mg/dL (15 Nima 2022 05:54)  POCT Blood Glucose.: 80 mg/dL (14 Jan 2022 23:24)  POCT Blood Glucose.: 182 mg/dL (14 Jan 2022 17:27)      RADIOLOGY & ADDITIONAL TESTS:    Imaging Personally Reviewed:  Consultant(s) Notes Reviewed:    Care Discussed with Consultants/Other Providers:

## 2022-01-15 NOTE — PROGRESS NOTE ADULT - PROBLEM SELECTOR PLAN 2
Afebrile, but developed recurrent fever and diarrhea few days back. found to have recurrent c. diff infection, and already completed course of PO vanco previously.  - ID f/u plan noted- continue PO vancomycin slow taper, 125 tid x 1 week, 125 BID x 1 week, 125 daily x 1 week, and 125 every other day x 2 weeks  - appreciated GI f/u- Banatrol added for loose stool will also give a trial of Cholestyramine  - will d/c rectal tube soon if the output is decreasing  - judicious use of abx therapy as may exacerbate c. diff

## 2022-01-16 LAB
ANION GAP SERPL CALC-SCNC: 9 MMOL/L — SIGNIFICANT CHANGE UP (ref 5–17)
BUN SERPL-MCNC: 21 MG/DL — SIGNIFICANT CHANGE UP (ref 7–23)
CALCIUM SERPL-MCNC: 9.3 MG/DL — SIGNIFICANT CHANGE UP (ref 8.4–10.5)
CHLORIDE SERPL-SCNC: 98 MMOL/L — SIGNIFICANT CHANGE UP (ref 96–108)
CO2 SERPL-SCNC: 31 MMOL/L — SIGNIFICANT CHANGE UP (ref 22–31)
CREAT SERPL-MCNC: 0.57 MG/DL — SIGNIFICANT CHANGE UP (ref 0.5–1.3)
GLUCOSE BLDC GLUCOMTR-MCNC: 128 MG/DL — HIGH (ref 70–99)
GLUCOSE BLDC GLUCOMTR-MCNC: 148 MG/DL — HIGH (ref 70–99)
GLUCOSE BLDC GLUCOMTR-MCNC: 172 MG/DL — HIGH (ref 70–99)
GLUCOSE BLDC GLUCOMTR-MCNC: 182 MG/DL — HIGH (ref 70–99)
GLUCOSE BLDC GLUCOMTR-MCNC: 189 MG/DL — HIGH (ref 70–99)
GLUCOSE SERPL-MCNC: 167 MG/DL — HIGH (ref 70–99)
HCT VFR BLD CALC: 25 % — LOW (ref 39–50)
HGB BLD-MCNC: 7.7 G/DL — LOW (ref 13–17)
MAGNESIUM SERPL-MCNC: 2.1 MG/DL — SIGNIFICANT CHANGE UP (ref 1.6–2.6)
MCHC RBC-ENTMCNC: 28.7 PG — SIGNIFICANT CHANGE UP (ref 27–34)
MCHC RBC-ENTMCNC: 30.8 GM/DL — LOW (ref 32–36)
MCV RBC AUTO: 93.3 FL — SIGNIFICANT CHANGE UP (ref 80–100)
NRBC # BLD: 0 /100 WBCS — SIGNIFICANT CHANGE UP (ref 0–0)
PHOSPHATE SERPL-MCNC: 4.2 MG/DL — SIGNIFICANT CHANGE UP (ref 2.5–4.5)
PLATELET # BLD AUTO: 471 K/UL — HIGH (ref 150–400)
POTASSIUM SERPL-MCNC: 4.4 MMOL/L — SIGNIFICANT CHANGE UP (ref 3.5–5.3)
POTASSIUM SERPL-SCNC: 4.4 MMOL/L — SIGNIFICANT CHANGE UP (ref 3.5–5.3)
RBC # BLD: 2.68 M/UL — LOW (ref 4.2–5.8)
RBC # FLD: 14 % — SIGNIFICANT CHANGE UP (ref 10.3–14.5)
SARS-COV-2 RNA SPEC QL NAA+PROBE: SIGNIFICANT CHANGE UP
SODIUM SERPL-SCNC: 138 MMOL/L — SIGNIFICANT CHANGE UP (ref 135–145)
WBC # BLD: 10.73 K/UL — HIGH (ref 3.8–10.5)
WBC # FLD AUTO: 10.73 K/UL — HIGH (ref 3.8–10.5)

## 2022-01-16 PROCEDURE — 99233 SBSQ HOSP IP/OBS HIGH 50: CPT

## 2022-01-16 RX ADMIN — Medication 1 TABLET(S): at 13:12

## 2022-01-16 RX ADMIN — Medication 125 MILLIGRAM(S): at 17:48

## 2022-01-16 RX ADMIN — APIXABAN 5 MILLIGRAM(S): 2.5 TABLET, FILM COATED ORAL at 17:48

## 2022-01-16 RX ADMIN — Medication 3 MILLILITER(S): at 23:20

## 2022-01-16 RX ADMIN — Medication 3 MILLILITER(S): at 17:46

## 2022-01-16 RX ADMIN — SODIUM CHLORIDE 3 MILLILITER(S): 9 INJECTION INTRAMUSCULAR; INTRAVENOUS; SUBCUTANEOUS at 23:20

## 2022-01-16 RX ADMIN — CHLORHEXIDINE GLUCONATE 15 MILLILITER(S): 213 SOLUTION TOPICAL at 17:48

## 2022-01-16 RX ADMIN — SODIUM CHLORIDE 3 MILLILITER(S): 9 INJECTION INTRAMUSCULAR; INTRAVENOUS; SUBCUTANEOUS at 13:11

## 2022-01-16 RX ADMIN — Medication 2: at 13:36

## 2022-01-16 RX ADMIN — SODIUM CHLORIDE 3 MILLILITER(S): 9 INJECTION INTRAMUSCULAR; INTRAVENOUS; SUBCUTANEOUS at 05:06

## 2022-01-16 RX ADMIN — CHOLESTYRAMINE 4 GRAM(S): 4 POWDER, FOR SUSPENSION ORAL at 13:10

## 2022-01-16 RX ADMIN — Medication 1 DROP(S): at 13:10

## 2022-01-16 RX ADMIN — CHOLESTYRAMINE 4 GRAM(S): 4 POWDER, FOR SUSPENSION ORAL at 23:20

## 2022-01-16 RX ADMIN — Medication 81 MILLIGRAM(S): at 13:12

## 2022-01-16 RX ADMIN — Medication 1 DROP(S): at 17:46

## 2022-01-16 RX ADMIN — Medication 4 MILLILITER(S): at 13:11

## 2022-01-16 RX ADMIN — Medication 1 DROP(S): at 23:21

## 2022-01-16 RX ADMIN — HUMAN INSULIN 12 UNIT(S): 100 INJECTION, SUSPENSION SUBCUTANEOUS at 06:11

## 2022-01-16 RX ADMIN — Medication 1 DROP(S): at 06:12

## 2022-01-16 RX ADMIN — Medication 4 MILLILITER(S): at 23:20

## 2022-01-16 RX ADMIN — HUMAN INSULIN 12 UNIT(S): 100 INJECTION, SUSPENSION SUBCUTANEOUS at 13:35

## 2022-01-16 RX ADMIN — Medication 4 MILLILITER(S): at 05:06

## 2022-01-16 RX ADMIN — PANTOPRAZOLE SODIUM 40 MILLIGRAM(S): 20 TABLET, DELAYED RELEASE ORAL at 13:11

## 2022-01-16 RX ADMIN — HUMAN INSULIN 12 UNIT(S): 100 INJECTION, SUSPENSION SUBCUTANEOUS at 23:21

## 2022-01-16 RX ADMIN — Medication 650 MILLIGRAM(S): at 05:05

## 2022-01-16 RX ADMIN — ATORVASTATIN CALCIUM 40 MILLIGRAM(S): 80 TABLET, FILM COATED ORAL at 23:20

## 2022-01-16 RX ADMIN — Medication 1 PACKET(S): at 13:10

## 2022-01-16 RX ADMIN — APIXABAN 5 MILLIGRAM(S): 2.5 TABLET, FILM COATED ORAL at 06:11

## 2022-01-16 RX ADMIN — Medication 3 MILLILITER(S): at 13:12

## 2022-01-16 RX ADMIN — Medication 125 MILLIGRAM(S): at 05:06

## 2022-01-16 RX ADMIN — Medication 4 MILLILITER(S): at 17:47

## 2022-01-16 RX ADMIN — SODIUM CHLORIDE 3 MILLILITER(S): 9 INJECTION INTRAMUSCULAR; INTRAVENOUS; SUBCUTANEOUS at 17:45

## 2022-01-16 RX ADMIN — Medication 3 MILLILITER(S): at 05:06

## 2022-01-16 RX ADMIN — CHOLESTYRAMINE 4 GRAM(S): 4 POWDER, FOR SUSPENSION ORAL at 06:12

## 2022-01-16 RX ADMIN — Medication 12.5 MILLIGRAM(S): at 05:05

## 2022-01-16 RX ADMIN — CHLORHEXIDINE GLUCONATE 15 MILLILITER(S): 213 SOLUTION TOPICAL at 06:12

## 2022-01-16 RX ADMIN — Medication 2 MILLIGRAM(S): at 05:05

## 2022-01-16 RX ADMIN — HUMAN INSULIN 12 UNIT(S): 100 INJECTION, SUSPENSION SUBCUTANEOUS at 17:48

## 2022-01-16 RX ADMIN — Medication 12.5 MILLIGRAM(S): at 17:46

## 2022-01-16 RX ADMIN — Medication 4 MILLILITER(S): at 00:58

## 2022-01-16 RX ADMIN — Medication 650 MILLIGRAM(S): at 08:00

## 2022-01-16 NOTE — PROGRESS NOTE ADULT - ASSESSMENT
61 year old man with respiratory failure d/t ICH    1. ICH  -per neurosurgery  -s/p  shunt    2. Respiratory failure  - for tracheostomy, will require long term enteral nutrition  - s/p PEG, continue TF and use of abdominal binder  - banatrol added for loose stool    3. Enterobacter PNA  -s/p course of antibiotics     4. Anemia, no overt GI bleed. EGD unremarkable. Anemia of chronic disease  -hgb stable  -monitor for GI bleeding     5. Cdiff infection   -rectal tube, ~100cc of loose brown stools noted   -on vanco taper  -doubt active cdiff at this time    6. DVT on a/c  -apixaban 5mg resumed  -continue PPI     7. Pneumothorax  -per CTS    Attending supervision statement: I have personally seen and examined the patient. I fully participated in the care of this patient. I have made amendments to the documentation where necessary, and agree with the history, physical exam, and plan as outlined by the ACP.      [facility]  Gastroenterology and Hepatology  [address] [place], [place]  Office: [phone]  Cell: [phone]

## 2022-01-16 NOTE — PROGRESS NOTE ADULT - SUBJECTIVE AND OBJECTIVE BOX
Andre Reyes, M.D.  Pager: [phone]  Office: [phone]    Patient is a 61y old  Male who presents with a chief complaint of Posterior fossa hemorrhage (15 Nima 2022 15:26)          SUBJECTIVE / OVERNIGHT EVENTS:    No acute overnight events.  unable to get a ROS due to cognitive impairment        MEDICATIONS  (STANDING):  acetylcysteine 20%  Inhalation 4 milliLiter(s) Inhalation every 6 hours  albuterol/ipratropium for Nebulization 3 milliLiter(s) Nebulizer every 6 hours  apixaban 5 milliGRAM(s) Enteral Tube every 12 hours  artificial  tears Solution 1 Drop(s) Both EYES every 6 hours  aspirin  chewable 81 milliGRAM(s) Oral daily  atorvastatin 40 milliGRAM(s) Oral at bedtime  chlorhexidine 0.12% Liquid 15 milliLiter(s) Oral Mucosa two times a day  cholestyramine Powder (Sugar-Free) 4 Gram(s) Oral three times a day  dextrose 50% Injectable 25 Gram(s) IV Push once  dextrose 50% Injectable 12.5 Gram(s) IV Push once  doxazosin 2 milliGRAM(s) Oral daily  insulin lispro (ADMELOG) corrective regimen sliding scale   SubCutaneous every 6 hours  insulin NPH human recombinant 12 Unit(s) SubCutaneous every 6 hours  metoprolol tartrate 12.5 milliGRAM(s) Oral two times a day  multivitamin 1 Tablet(s) Oral daily  pantoprazole  Injectable 40 milliGRAM(s) IV Push daily  psyllium Powder 1 Packet(s) Oral daily  sodium chloride 3%  Inhalation 3 milliLiter(s) Inhalation every 6 hours  vancomycin    Solution 125 milliGRAM(s) Oral two times a day    MEDICATIONS  (PRN):  acetaminophen    Suspension .. 650 milliGRAM(s) Enteral Tube every 6 hours PRN Temp greater or equal to 38C (100.4F), Mild Pain (1 - 3)          T(C): 36.7 (01-16 @ 16:14), Max: 37.3 (01-15 @ 23:25)   HR: 100   BP: 101/61   RR: 18   SpO2: 99%    PHYSICAL EXAM:    CONSTITUTIONAL: Chronically ill appearing male laying in bed in NAD  EYES: Opening eyes, clear sclera  NECK: +Trach  RESPIRATORY: Normal respiratory effort; rhonchi bilaterally in upper lung fields  CARDIOVASCULAR: Regular rate and rhythm, normal S1 and S2, no murmur/rub/gallop; No lower extremity edema  ABDOMEN: Nontender to palpation, normoactive bowel sounds, no rebound/guarding, +PEG, + rectal tube  MUSCULOSKELETAL: No clubbing or cyanosis of digits; no joint swelling or tenderness to palpation  PSYCH: Awake, alert, unable to assess orientation as patient nonverbal  NEUROLOGY: Able to make fist with both hands, infrequently able to follow simple commands  SKIN: No rashes; no palpable lesions      LABS:                        7.7    10.73 )-----------( 471      ( 16 Jan 2022 06:05 )             25.0      01-16    138  |  98  |  21  ----------------------------<  167<H>  4.4   |  31  |  0.57    Ca    9.3      16 Jan 2022 06:06  Phos  4.2     01-16  Mg     2.1     01-16         CAPILLARY BLOOD GLUCOSE      POCT Blood Glucose.: 182 mg/dL (16 Jan 2022 13:17)  POCT Blood Glucose.: 172 mg/dL (16 Jan 2022 12:05)  POCT Blood Glucose.: 189 mg/dL (16 Jan 2022 05:45)  POCT Blood Glucose.: 144 mg/dL (15 Nima 2022 23:33)  POCT Blood Glucose.: 144 mg/dL (15 Nima 2022 22:27)  POCT Blood Glucose.: 129 mg/dL (15 Nima 2022 18:44)  POCT Blood Glucose.: 140 mg/dL (15 Nima 2022 17:15)      RADIOLOGY & ADDITIONAL TESTS:    Imaging Personally Reviewed:  Consultant(s) Notes Reviewed:    Care Discussed with Consultants/Other Providers:

## 2022-01-16 NOTE — PROGRESS NOTE ADULT - PROBLEM SELECTOR PLAN 11
CT a/p with a 2.4 cm nodular soft tissue density in the bladder appears separate from the prostate gland, concerning for bladder lesion  - as per urology - findings concerning for bladder tumor vs prostate gland. cysto transurethral resection would require general anesthesia. recommended checking urine cytology to determine next steps which returned negative. will still need outpatient urology follow-up regardless once acute issues improve  - PSA normal, urine cytology negative for malignancy  - previously spoke to the patient's wife, Sandrita Bauer [phone]. Given the patient's underlying comorbid conditions coupled with his recent devastating neurologic events, other medical complications that ensued, and his poor neurologic and functional status at this time, it may be prudent to monitor his progress and see if he makes any meaningful recovery over the coming weeks to months to decide whether the benefit of pursuing any invasive work up for the bladder lesion outweighs the risk. Spouse seems to be in agreement.  - Will require eventual outpatient f/u with Urology

## 2022-01-16 NOTE — PROGRESS NOTE ADULT - SUBJECTIVE AND OBJECTIVE BOX
Chief Complaint:  Patient is a 61y old  Male who presents with a chief complaint of Posterior fossa hemorrhage (13 Jan 2022 13:31)      Date of service 01-16-22 @ 11:24      Interval Events:  Patient seen and examined. No acute overnight events.     Hospital Medications:  acetaminophen    Suspension .. 650 milliGRAM(s) Enteral Tube every 6 hours PRN  acetylcysteine 20%  Inhalation 4 milliLiter(s) Inhalation every 6 hours  albuterol/ipratropium for Nebulization 3 milliLiter(s) Nebulizer every 6 hours  apixaban 5 milliGRAM(s) Enteral Tube every 12 hours  artificial  tears Solution 1 Drop(s) Both EYES every 6 hours  aspirin  chewable 81 milliGRAM(s) Oral daily  atorvastatin 40 milliGRAM(s) Oral at bedtime  chlorhexidine 0.12% Liquid 15 milliLiter(s) Oral Mucosa two times a day  dextrose 50% Injectable 25 Gram(s) IV Push once  dextrose 50% Injectable 12.5 Gram(s) IV Push once  doxazosin 2 milliGRAM(s) Oral daily  insulin lispro (ADMELOG) corrective regimen sliding scale   SubCutaneous every 6 hours  insulin NPH human recombinant 12 Unit(s) SubCutaneous every 6 hours  metoprolol tartrate 12.5 milliGRAM(s) Oral two times a day  multivitamin 1 Tablet(s) Oral daily  pantoprazole  Injectable 40 milliGRAM(s) IV Push daily  psyllium Powder 1 Packet(s) Oral daily  sodium chloride 3%  Inhalation 3 milliLiter(s) Inhalation every 6 hours  vancomycin    Solution 125 milliGRAM(s) Oral two times a day        Review of Systems:  unable to obtain    PHYSICAL EXAM:   Vital Signs Last 24 Hrs  T(C): 36.3 (16 Jan 2022 04:33), Max: 37.3 (15 Nima 2022 23:25)  T(F): 97.4 (16 Jan 2022 04:33), Max: 99.1 (15 Nima 2022 23:25)  HR: 103 (16 Jan 2022 04:33) (84 - 103)  BP: 107/70 (16 Jan 2022 04:33) (107/70 - 128/80)  BP(mean): --  RR: 18 (16 Jan 2022 04:33) (18 - 18)  SpO2: 98% (16 Jan 2022 04:33) (95% - 100%)    PHYSICAL EXAM:     GENERAL:  Appears stated age, well-groomed, well-nourished, no distress  HEENT:  NC/AT,  conjunctivae anicteric, clear and pink,   NECK: supple, trachea midline, +trach collar   CHEST:  Full & symmetric excursion, no increased effort, breath sounds clear  HEART:  Regular rhythm, no JVD  ABDOMEN:  Soft, non-tender, non-distended, normoactive bowel sounds,  no masses , no hepatosplenomegaly, +gastrostomy  EXTREMITIES:  no cyanosis,clubbing or edema  SKIN:  No rash, erythema, or, ecchymoses, no jaundice  NEURO:  non-focal, no asterixis  RECTAL: Deferred      LABS Personally reviewed by me:                        7.7    8.97  )-----------( 383      ( 13 Jan 2022 06:17 )             24.9       01-13    134<L>  |  94<L>  |  18  ----------------------------<  139<H>  4.3   |  31  |  0.54    Ca    9.1      13 Jan 2022 06:17  Phos  4.3     01-13  Mg     2.1     01-13                                    7.7    8.97  )-----------( 383      ( 13 Jan 2022 06:17 )             24.9                         7.6    10.97 )-----------( 368      ( 12 Jan 2022 07:20 )             24.7       Imaging personally reviewed by me:

## 2022-01-16 NOTE — PROGRESS NOTE ADULT - PROBLEM SELECTOR PLAN 2
Afebrile, but developed recurrent fever and diarrhea few days back. found to have recurrent c. diff infection, and already completed course of PO vanco previously.  - ID f/u plan noted- continue PO vancomycin slow taper, 125 tid x 1 week, 125 BID x 1 week, 125 daily x 1 week, and 125 every other day x 2 weeks  - will increase Banatrol to QID for loose stool will also give a trial of Cholestyramine  - will d/c rectal tube soon if the output is decreasing  - judicious use of abx therapy as may exacerbate c. diff

## 2022-01-17 LAB
GLUCOSE BLDC GLUCOMTR-MCNC: 144 MG/DL — HIGH (ref 70–99)
GLUCOSE BLDC GLUCOMTR-MCNC: 146 MG/DL — HIGH (ref 70–99)
GLUCOSE BLDC GLUCOMTR-MCNC: 154 MG/DL — HIGH (ref 70–99)
HCT VFR BLD CALC: 23.4 % — LOW (ref 39–50)
HGB BLD-MCNC: 7.2 G/DL — LOW (ref 13–17)
MCHC RBC-ENTMCNC: 28.5 PG — SIGNIFICANT CHANGE UP (ref 27–34)
MCHC RBC-ENTMCNC: 30.8 GM/DL — LOW (ref 32–36)
MCV RBC AUTO: 92.5 FL — SIGNIFICANT CHANGE UP (ref 80–100)
NRBC # BLD: 0 /100 WBCS — SIGNIFICANT CHANGE UP (ref 0–0)
PLATELET # BLD AUTO: 462 K/UL — HIGH (ref 150–400)
RBC # BLD: 2.53 M/UL — LOW (ref 4.2–5.8)
RBC # FLD: 13.9 % — SIGNIFICANT CHANGE UP (ref 10.3–14.5)
WBC # BLD: 10.36 K/UL — SIGNIFICANT CHANGE UP (ref 3.8–10.5)
WBC # FLD AUTO: 10.36 K/UL — SIGNIFICANT CHANGE UP (ref 3.8–10.5)

## 2022-01-17 PROCEDURE — 99233 SBSQ HOSP IP/OBS HIGH 50: CPT

## 2022-01-17 RX ORDER — DIPHENOXYLATE HCL/ATROPINE 2.5-.025MG
2 TABLET ORAL
Refills: 0 | Status: DISCONTINUED | OUTPATIENT
Start: 2022-01-17 | End: 2022-01-18

## 2022-01-17 RX ADMIN — Medication 125 MILLIGRAM(S): at 17:24

## 2022-01-17 RX ADMIN — SODIUM CHLORIDE 3 MILLILITER(S): 9 INJECTION INTRAMUSCULAR; INTRAVENOUS; SUBCUTANEOUS at 17:23

## 2022-01-17 RX ADMIN — Medication 1 DROP(S): at 17:23

## 2022-01-17 RX ADMIN — Medication 1 DROP(S): at 05:29

## 2022-01-17 RX ADMIN — Medication 3 MILLILITER(S): at 05:29

## 2022-01-17 RX ADMIN — HUMAN INSULIN 12 UNIT(S): 100 INJECTION, SUSPENSION SUBCUTANEOUS at 12:46

## 2022-01-17 RX ADMIN — Medication 2 TABLET(S): at 17:29

## 2022-01-17 RX ADMIN — SODIUM CHLORIDE 3 MILLILITER(S): 9 INJECTION INTRAMUSCULAR; INTRAVENOUS; SUBCUTANEOUS at 05:29

## 2022-01-17 RX ADMIN — HUMAN INSULIN 12 UNIT(S): 100 INJECTION, SUSPENSION SUBCUTANEOUS at 05:42

## 2022-01-17 RX ADMIN — Medication 2 MILLIGRAM(S): at 05:42

## 2022-01-17 RX ADMIN — APIXABAN 5 MILLIGRAM(S): 2.5 TABLET, FILM COATED ORAL at 17:23

## 2022-01-17 RX ADMIN — Medication 1 TABLET(S): at 12:47

## 2022-01-17 RX ADMIN — Medication 650 MILLIGRAM(S): at 07:00

## 2022-01-17 RX ADMIN — Medication 2: at 05:42

## 2022-01-17 RX ADMIN — Medication 650 MILLIGRAM(S): at 05:38

## 2022-01-17 RX ADMIN — SODIUM CHLORIDE 3 MILLILITER(S): 9 INJECTION INTRAMUSCULAR; INTRAVENOUS; SUBCUTANEOUS at 12:45

## 2022-01-17 RX ADMIN — Medication 3 MILLILITER(S): at 12:45

## 2022-01-17 RX ADMIN — Medication 12.5 MILLIGRAM(S): at 17:24

## 2022-01-17 RX ADMIN — CHOLESTYRAMINE 4 GRAM(S): 4 POWDER, FOR SUSPENSION ORAL at 05:43

## 2022-01-17 RX ADMIN — Medication 4 MILLILITER(S): at 17:45

## 2022-01-17 RX ADMIN — CHOLESTYRAMINE 4 GRAM(S): 4 POWDER, FOR SUSPENSION ORAL at 14:13

## 2022-01-17 RX ADMIN — Medication 4 MILLILITER(S): at 05:29

## 2022-01-17 RX ADMIN — Medication 4 MILLILITER(S): at 14:13

## 2022-01-17 RX ADMIN — APIXABAN 5 MILLIGRAM(S): 2.5 TABLET, FILM COATED ORAL at 05:43

## 2022-01-17 RX ADMIN — Medication 3 MILLILITER(S): at 17:22

## 2022-01-17 RX ADMIN — CHLORHEXIDINE GLUCONATE 15 MILLILITER(S): 213 SOLUTION TOPICAL at 05:43

## 2022-01-17 RX ADMIN — Medication 1 PACKET(S): at 12:47

## 2022-01-17 RX ADMIN — Medication 12.5 MILLIGRAM(S): at 05:42

## 2022-01-17 RX ADMIN — CHLORHEXIDINE GLUCONATE 15 MILLILITER(S): 213 SOLUTION TOPICAL at 17:24

## 2022-01-17 RX ADMIN — PANTOPRAZOLE SODIUM 40 MILLIGRAM(S): 20 TABLET, DELAYED RELEASE ORAL at 12:47

## 2022-01-17 RX ADMIN — Medication 81 MILLIGRAM(S): at 12:46

## 2022-01-17 RX ADMIN — Medication 1 DROP(S): at 12:46

## 2022-01-17 RX ADMIN — Medication 125 MILLIGRAM(S): at 05:42

## 2022-01-17 RX ADMIN — HUMAN INSULIN 12 UNIT(S): 100 INJECTION, SUSPENSION SUBCUTANEOUS at 17:23

## 2022-01-17 NOTE — PROGRESS NOTE ADULT - SUBJECTIVE AND OBJECTIVE BOX
Chief Complaint:  Patient is a 61y old  Male who presents with a chief complaint of Posterior fossa hemorrhage (16 Jan 2022 16:24)      Date of service 01-17-22 @ 12:03      Interval Events:   Patient seen and examined. No acute overnight events. Diarrhea improving.     Hospital Medications:  acetaminophen    Suspension .. 650 milliGRAM(s) Enteral Tube every 6 hours PRN  acetylcysteine 20%  Inhalation 4 milliLiter(s) Inhalation every 6 hours  albuterol/ipratropium for Nebulization 3 milliLiter(s) Nebulizer every 6 hours  apixaban 5 milliGRAM(s) Enteral Tube every 12 hours  artificial  tears Solution 1 Drop(s) Both EYES every 6 hours  aspirin  chewable 81 milliGRAM(s) Oral daily  atorvastatin 40 milliGRAM(s) Oral at bedtime  chlorhexidine 0.12% Liquid 15 milliLiter(s) Oral Mucosa two times a day  cholestyramine Powder (Sugar-Free) 4 Gram(s) Oral three times a day  dextrose 50% Injectable 25 Gram(s) IV Push once  dextrose 50% Injectable 12.5 Gram(s) IV Push once  diphenoxylate/atropine 2 Tablet(s) Oral two times a day  doxazosin 2 milliGRAM(s) Oral daily  insulin lispro (ADMELOG) corrective regimen sliding scale   SubCutaneous every 6 hours  insulin NPH human recombinant 12 Unit(s) SubCutaneous every 6 hours  metoprolol tartrate 12.5 milliGRAM(s) Oral two times a day  multivitamin 1 Tablet(s) Oral daily  pantoprazole  Injectable 40 milliGRAM(s) IV Push daily  psyllium Powder 1 Packet(s) Oral daily  sodium chloride 3%  Inhalation 3 milliLiter(s) Inhalation every 6 hours  vancomycin    Solution 125 milliGRAM(s) Oral two times a day        Review of Systems:  unable to obtain    PHYSICAL EXAM:   Vital Signs:  Vital Signs Last 24 Hrs  T(C): 37.1 (17 Jan 2022 08:30), Max: 37.1 (17 Jan 2022 08:30)  T(F): 98.7 (17 Jan 2022 08:30), Max: 98.7 (17 Jan 2022 08:30)  HR: 84 (17 Jan 2022 08:30) (84 - 100)  BP: 124/74 (17 Jan 2022 08:30) (101/61 - 136/84)  BP(mean): --  RR: 18 (17 Jan 2022 08:30) (18 - 18)  SpO2: 100% (17 Jan 2022 08:30) (99% - 100%)  Daily     Daily       PHYSICAL EXAM:     GENERAL:  Appears stated age, well-groomed, well-nourished, no distress  HEENT:  NC/AT,  conjunctivae anicteric, clear and pink,   NECK: supple, trachea midline  CHEST:  Full & symmetric excursion, no increased effort, breath sounds clear  HEART:  Regular rhythm, no JVD  ABDOMEN:  Soft, non-tender, non-distended, normoactive bowel sounds,  no masses , no hepatosplenomegaly, +gastrostomy   EXTREMITIES:  no cyanosis,clubbing or edema  SKIN:  No rash, erythema, or, ecchymoses, no jaundice  NEURO:  non-focal, no asterixis  RECTAL: Deferred      LABS Personally reviewed by me:                        7.2    10.36 )-----------( 462      ( 17 Jan 2022 06:07 )             23.4     Mean Cell Volume: 92.5 fl (01-17-22 @ 06:07)    01-16    138  |  98  |  21  ----------------------------<  167<H>  4.4   |  31  |  0.57    Ca    9.3      16 Jan 2022 06:06  Phos  4.2     01-16  Mg     2.1     01-16                                    7.2    10.36 )-----------( 462      ( 17 Jan 2022 06:07 )             23.4                         7.7    10.73 )-----------( 471      ( 16 Jan 2022 06:05 )             25.0       Imaging personally reviewed by me:

## 2022-01-17 NOTE — PROGRESS NOTE ADULT - ASSESSMENT
61 year old man with respiratory failure d/t ICH    1. ICH  -per neurosurgery  -s/p  shunt    2. Respiratory failure  - for tracheostomy, will require long term enteral nutrition  - s/p PEG, continue TF and use of abdominal binder  - banatrol added for loose stool    3. Enterobacter PNA  -s/p course of antibiotics     4. Anemia, no overt GI bleed. EGD unremarkable. Anemia of chronic disease  -hgb stable  -monitor for GI bleeding     5. Cdiff infection   -rectal tube, ~25cc of loose brown stools noted   -on vanco taper  -doubt active cdiff at this time    6. DVT on a/c  -apixaban 5mg resumed  -continue PPI     7. Pneumothorax  -per CTS    Attending supervision statement: I have personally seen and examined the patient. I fully participated in the care of this patient. I have made amendments to the documentation where necessary, and agree with the history, physical exam, and plan as outlined by the ACP.      [facility]  Gastroenterology and Hepatology  [address] [place], [place]  Office: [phone]  Cell: [phone]

## 2022-01-17 NOTE — PROGRESS NOTE ADULT - SUBJECTIVE AND OBJECTIVE BOX
Andre Reyes, M.D.  Pager: [phone]  Office: [phone]    Patient is a 61y old  Male who presents with a chief complaint of Posterior fossa hemorrhage (16 Jan 2022 16:24)          SUBJECTIVE / OVERNIGHT EVENTS:    No acute overnight events.  unable to give ROS due to cognitive impairment    MEDICATIONS  (STANDING):  acetylcysteine 20%  Inhalation 4 milliLiter(s) Inhalation every 6 hours  albuterol/ipratropium for Nebulization 3 milliLiter(s) Nebulizer every 6 hours  apixaban 5 milliGRAM(s) Enteral Tube every 12 hours  artificial  tears Solution 1 Drop(s) Both EYES every 6 hours  aspirin  chewable 81 milliGRAM(s) Oral daily  atorvastatin 40 milliGRAM(s) Oral at bedtime  chlorhexidine 0.12% Liquid 15 milliLiter(s) Oral Mucosa two times a day  cholestyramine Powder (Sugar-Free) 4 Gram(s) Oral three times a day  dextrose 50% Injectable 25 Gram(s) IV Push once  dextrose 50% Injectable 12.5 Gram(s) IV Push once  diphenoxylate/atropine 2 Tablet(s) Oral two times a day  doxazosin 2 milliGRAM(s) Oral daily  insulin lispro (ADMELOG) corrective regimen sliding scale   SubCutaneous every 6 hours  insulin NPH human recombinant 12 Unit(s) SubCutaneous every 6 hours  metoprolol tartrate 12.5 milliGRAM(s) Oral two times a day  multivitamin 1 Tablet(s) Oral daily  pantoprazole  Injectable 40 milliGRAM(s) IV Push daily  psyllium Powder 1 Packet(s) Oral daily  sodium chloride 3%  Inhalation 3 milliLiter(s) Inhalation every 6 hours  vancomycin    Solution 125 milliGRAM(s) Oral two times a day    MEDICATIONS  (PRN):  acetaminophen    Suspension .. 650 milliGRAM(s) Enteral Tube every 6 hours PRN Temp greater or equal to 38C (100.4F), Mild Pain (1 - 3)          T(C): 36.6 (01-17 @ 20:02), Max: 37.1 (01-17 @ 08:30)   HR: 90   BP: 124/74   RR: 18   SpO2: 100%    PHYSICAL EXAM:    CONSTITUTIONAL: Chronically ill appearing male laying in bed in NAD  EYES: Opening eyes, clear sclera  NECK: +Trach  RESPIRATORY: Normal respiratory effort; rhonchi bilaterally in upper lung fields  CARDIOVASCULAR: Regular rate and rhythm, normal S1 and S2, no murmur/rub/gallop; No lower extremity edema  ABDOMEN: Nontender to palpation, normoactive bowel sounds, no rebound/guarding, +PEG, + rectal tube  MUSCULOSKELETAL: No clubbing or cyanosis of digits; no joint swelling or tenderness to palpation  PSYCH: Awake, alert, unable to assess orientation as patient nonverbal  NEUROLOGY: Able to make fist with both hands, infrequently able to follow simple commands  SKIN: No rashes; no palpable lesions    LABS:                        7.2    10.36 )-----------( 462      ( 17 Jan 2022 06:07 )             23.4      01-16    138  |  98  |  21  ----------------------------<  167<H>  4.4   |  31  |  0.57    Ca    9.3      16 Jan 2022 06:06  Phos  4.2     01-16  Mg     2.1     01-16         CAPILLARY BLOOD GLUCOSE      POCT Blood Glucose.: 144 mg/dL (17 Jan 2022 16:41)  POCT Blood Glucose.: 146 mg/dL (17 Jan 2022 11:42)  POCT Blood Glucose.: 154 mg/dL (17 Jan 2022 05:25)  POCT Blood Glucose.: 128 mg/dL (16 Jan 2022 23:18)      RADIOLOGY & ADDITIONAL TESTS:    Imaging Personally Reviewed:  Consultant(s) Notes Reviewed:    Care Discussed with Consultants/Other Providers:

## 2022-01-17 NOTE — PROGRESS NOTE ADULT - PROBLEM SELECTOR PLAN 11
CT a/p with a 2.4 cm nodular soft tissue density in the bladder appears separate from the prostate gland, concerning for bladder lesion  - as per urology - findings concerning for bladder tumor vs prostate gland. cysto transurethral resection would require general anesthesia. recommended checking urine cytology to determine next steps which returned negative. will still need outpatient urology follow-up regardless once acute issues improve  - PSA normal, urine cytology negative for malignancy  - previously spoke to the patient's wife, Sandrita Bauer [phone]. Given the patient's underlying comorbid conditions coupled with his recent devastating neurologic events, other medical complications that ensued, and his poor neurologic and functional status at this time, it may be prudent to monitor his progress and see if he makes any meaningful recovery over the coming weeks to months to decide whether the benefit of pursuing any invasive work up for the bladder lesion outweighs the risk. Spouse seems to be in agreement.  - Will require eventual outpatient f/u with Urology

## 2022-01-17 NOTE — PROGRESS NOTE ADULT - PROBLEM SELECTOR PLAN 2
Afebrile, but developed recurrent fever and diarrhea few days back. found to have recurrent c. diff infection, and already completed course of PO vanco previously.  - ID f/u plan noted- continue PO vancomycin slow taper, 125 tid x 1 week, 125 BID x 1 week, 125 daily x 1 week, and 125 every other day x 2 weeks  - will increase Banatrol to QID for loose stool will also give a trial of Cholestyramine and lomotil   - d/c rectal  - judicious use of abx therapy as may exacerbate c. diff  - plan d/c GI Dr. Noel

## 2022-01-18 LAB
ANION GAP SERPL CALC-SCNC: 11 MMOL/L — SIGNIFICANT CHANGE UP (ref 5–17)
BLD GP AB SCN SERPL QL: NEGATIVE — SIGNIFICANT CHANGE UP
BUN SERPL-MCNC: 19 MG/DL — SIGNIFICANT CHANGE UP (ref 7–23)
CALCIUM SERPL-MCNC: 9.1 MG/DL — SIGNIFICANT CHANGE UP (ref 8.4–10.5)
CHLORIDE SERPL-SCNC: 96 MMOL/L — SIGNIFICANT CHANGE UP (ref 96–108)
CO2 SERPL-SCNC: 30 MMOL/L — SIGNIFICANT CHANGE UP (ref 22–31)
CREAT SERPL-MCNC: 0.5 MG/DL — SIGNIFICANT CHANGE UP (ref 0.5–1.3)
GLUCOSE BLDC GLUCOMTR-MCNC: 111 MG/DL — HIGH (ref 70–99)
GLUCOSE BLDC GLUCOMTR-MCNC: 123 MG/DL — HIGH (ref 70–99)
GLUCOSE SERPL-MCNC: 104 MG/DL — HIGH (ref 70–99)
HCT VFR BLD CALC: 25.6 % — LOW (ref 39–50)
HCT VFR BLD CALC: 25.9 % — LOW (ref 39–50)
HGB BLD-MCNC: 7.8 G/DL — LOW (ref 13–17)
HGB BLD-MCNC: 8 G/DL — LOW (ref 13–17)
MCHC RBC-ENTMCNC: 28.8 PG — SIGNIFICANT CHANGE UP (ref 27–34)
MCHC RBC-ENTMCNC: 29.2 PG — SIGNIFICANT CHANGE UP (ref 27–34)
MCHC RBC-ENTMCNC: 30.1 GM/DL — LOW (ref 32–36)
MCHC RBC-ENTMCNC: 31.3 GM/DL — LOW (ref 32–36)
MCV RBC AUTO: 93.4 FL — SIGNIFICANT CHANGE UP (ref 80–100)
MCV RBC AUTO: 95.6 FL — SIGNIFICANT CHANGE UP (ref 80–100)
NRBC # BLD: 0 /100 WBCS — SIGNIFICANT CHANGE UP (ref 0–0)
NRBC # BLD: 0 /100 WBCS — SIGNIFICANT CHANGE UP (ref 0–0)
PLATELET # BLD AUTO: 521 K/UL — HIGH (ref 150–400)
PLATELET # BLD AUTO: 556 K/UL — HIGH (ref 150–400)
POTASSIUM SERPL-MCNC: 4.1 MMOL/L — SIGNIFICANT CHANGE UP (ref 3.5–5.3)
POTASSIUM SERPL-SCNC: 4.1 MMOL/L — SIGNIFICANT CHANGE UP (ref 3.5–5.3)
RBC # BLD: 2.71 M/UL — LOW (ref 4.2–5.8)
RBC # BLD: 2.74 M/UL — LOW (ref 4.2–5.8)
RBC # FLD: 13.7 % — SIGNIFICANT CHANGE UP (ref 10.3–14.5)
RBC # FLD: 14 % — SIGNIFICANT CHANGE UP (ref 10.3–14.5)
RH IG SCN BLD-IMP: POSITIVE — SIGNIFICANT CHANGE UP
SARS-COV-2 RNA SPEC QL NAA+PROBE: SIGNIFICANT CHANGE UP
SODIUM SERPL-SCNC: 137 MMOL/L — SIGNIFICANT CHANGE UP (ref 135–145)
WBC # BLD: 7.23 K/UL — SIGNIFICANT CHANGE UP (ref 3.8–10.5)
WBC # BLD: 8.7 K/UL — SIGNIFICANT CHANGE UP (ref 3.8–10.5)
WBC # FLD AUTO: 7.23 K/UL — SIGNIFICANT CHANGE UP (ref 3.8–10.5)
WBC # FLD AUTO: 8.7 K/UL — SIGNIFICANT CHANGE UP (ref 3.8–10.5)

## 2022-01-18 PROCEDURE — 71045 X-RAY EXAM CHEST 1 VIEW: CPT | Mod: 26

## 2022-01-18 PROCEDURE — 99232 SBSQ HOSP IP/OBS MODERATE 35: CPT

## 2022-01-18 PROCEDURE — 99233 SBSQ HOSP IP/OBS HIGH 50: CPT

## 2022-01-18 RX ORDER — DIPHENOXYLATE HCL/ATROPINE 2.5-.025MG
2 TABLET ORAL THREE TIMES A DAY
Refills: 0 | Status: DISCONTINUED | OUTPATIENT
Start: 2022-01-18 | End: 2022-01-25

## 2022-01-18 RX ADMIN — Medication 1 TABLET(S): at 13:30

## 2022-01-18 RX ADMIN — Medication 1 PACKET(S): at 13:29

## 2022-01-18 RX ADMIN — CHOLESTYRAMINE 4 GRAM(S): 4 POWDER, FOR SUSPENSION ORAL at 05:49

## 2022-01-18 RX ADMIN — Medication 4 MILLILITER(S): at 13:30

## 2022-01-18 RX ADMIN — HUMAN INSULIN 12 UNIT(S): 100 INJECTION, SUSPENSION SUBCUTANEOUS at 18:04

## 2022-01-18 RX ADMIN — Medication 2 TABLET(S): at 21:20

## 2022-01-18 RX ADMIN — Medication 3 MILLILITER(S): at 05:46

## 2022-01-18 RX ADMIN — Medication 125 MILLIGRAM(S): at 18:03

## 2022-01-18 RX ADMIN — APIXABAN 5 MILLIGRAM(S): 2.5 TABLET, FILM COATED ORAL at 05:48

## 2022-01-18 RX ADMIN — Medication 2: at 18:05

## 2022-01-18 RX ADMIN — APIXABAN 5 MILLIGRAM(S): 2.5 TABLET, FILM COATED ORAL at 18:03

## 2022-01-18 RX ADMIN — Medication 125 MILLIGRAM(S): at 05:54

## 2022-01-18 RX ADMIN — Medication 1 DROP(S): at 18:06

## 2022-01-18 RX ADMIN — Medication 12.5 MILLIGRAM(S): at 05:49

## 2022-01-18 RX ADMIN — SODIUM CHLORIDE 3 MILLILITER(S): 9 INJECTION INTRAMUSCULAR; INTRAVENOUS; SUBCUTANEOUS at 00:08

## 2022-01-18 RX ADMIN — HUMAN INSULIN 12 UNIT(S): 100 INJECTION, SUSPENSION SUBCUTANEOUS at 00:12

## 2022-01-18 RX ADMIN — Medication 12.5 MILLIGRAM(S): at 17:55

## 2022-01-18 RX ADMIN — HUMAN INSULIN 12 UNIT(S): 100 INJECTION, SUSPENSION SUBCUTANEOUS at 13:48

## 2022-01-18 RX ADMIN — PANTOPRAZOLE SODIUM 40 MILLIGRAM(S): 20 TABLET, DELAYED RELEASE ORAL at 13:29

## 2022-01-18 RX ADMIN — Medication 3 MILLILITER(S): at 13:28

## 2022-01-18 RX ADMIN — SODIUM CHLORIDE 3 MILLILITER(S): 9 INJECTION INTRAMUSCULAR; INTRAVENOUS; SUBCUTANEOUS at 18:03

## 2022-01-18 RX ADMIN — Medication 1 DROP(S): at 00:08

## 2022-01-18 RX ADMIN — Medication 1 DROP(S): at 05:50

## 2022-01-18 RX ADMIN — HUMAN INSULIN 12 UNIT(S): 100 INJECTION, SUSPENSION SUBCUTANEOUS at 05:59

## 2022-01-18 RX ADMIN — CHLORHEXIDINE GLUCONATE 15 MILLILITER(S): 213 SOLUTION TOPICAL at 18:17

## 2022-01-18 RX ADMIN — Medication 4 MILLILITER(S): at 05:47

## 2022-01-18 RX ADMIN — Medication 4 MILLILITER(S): at 18:14

## 2022-01-18 RX ADMIN — CHOLESTYRAMINE 4 GRAM(S): 4 POWDER, FOR SUSPENSION ORAL at 14:25

## 2022-01-18 RX ADMIN — Medication 650 MILLIGRAM(S): at 05:49

## 2022-01-18 RX ADMIN — CHOLESTYRAMINE 4 GRAM(S): 4 POWDER, FOR SUSPENSION ORAL at 00:08

## 2022-01-18 RX ADMIN — Medication 2 MILLIGRAM(S): at 05:49

## 2022-01-18 RX ADMIN — Medication 2 TABLET(S): at 05:48

## 2022-01-18 RX ADMIN — Medication 650 MILLIGRAM(S): at 06:37

## 2022-01-18 RX ADMIN — ATORVASTATIN CALCIUM 40 MILLIGRAM(S): 80 TABLET, FILM COATED ORAL at 00:11

## 2022-01-18 RX ADMIN — SODIUM CHLORIDE 3 MILLILITER(S): 9 INJECTION INTRAMUSCULAR; INTRAVENOUS; SUBCUTANEOUS at 05:45

## 2022-01-18 RX ADMIN — Medication 81 MILLIGRAM(S): at 13:30

## 2022-01-18 RX ADMIN — Medication 1 DROP(S): at 13:48

## 2022-01-18 RX ADMIN — CHLORHEXIDINE GLUCONATE 15 MILLILITER(S): 213 SOLUTION TOPICAL at 05:50

## 2022-01-18 RX ADMIN — Medication 3 MILLILITER(S): at 00:08

## 2022-01-18 RX ADMIN — Medication 3 MILLILITER(S): at 18:03

## 2022-01-18 RX ADMIN — Medication 4 MILLILITER(S): at 00:11

## 2022-01-18 RX ADMIN — SODIUM CHLORIDE 3 MILLILITER(S): 9 INJECTION INTRAMUSCULAR; INTRAVENOUS; SUBCUTANEOUS at 13:29

## 2022-01-18 RX ADMIN — CHOLESTYRAMINE 4 GRAM(S): 4 POWDER, FOR SUSPENSION ORAL at 21:20

## 2022-01-18 RX ADMIN — ATORVASTATIN CALCIUM 40 MILLIGRAM(S): 80 TABLET, FILM COATED ORAL at 21:21

## 2022-01-18 NOTE — PROGRESS NOTE ADULT - PROBLEM SELECTOR PLAN 2
Afebrile, but developed recurrent fever and diarrhea few days back. found to have recurrent c. diff infection, and already completed course of PO vanco previously.  - ID f/u plan noted- continue PO vancomycin slow taper, 125 tid x 1 week, 125 BID x 1 week, 125 daily x 1 week, and 125 every other day x 2 weeks  - will increase Banatrol to QID for loose stool will also give a trial of Cholestyramine and lomotil   - d/c rectal  - judicious use of abx therapy as may exacerbate c. diff  - plan d/c GI Dr. Noel Already completed course of PO vanco previously.  GI has a low suspicion for active C. Diff infection  - ID recommending continue PO vancomycin slow taper, 125 tid x 1 week, 125 BID x 1 week, 125 daily x 1 week, and 125 every other day x 2 weeks  - c/w Banatrol QID for loose stool, Cholestyramine and lomotil   - increase lomotil to tid  - rectal tube removed.  - judicious use of abx therapy as may exacerbate c. diff  - plan d/c GI Dr. Noel

## 2022-01-18 NOTE — PROVIDER CONTACT NOTE (OTHER) - BACKGROUND
L poserior fossa bleed with IVH SAH/hydrocephalus, hx DM2, HTN
Pt admitted for ICH.
s/p subocc crani 11/4
s/p subocc crani 11/4
Intracranial Hemorrhage
Pt s/p decompressive SOC craniectomy with EVD placed at 15 cm H2O
non-traumatic intracranial hemm
pt s/p suboccipital crani.
Admitted for posterior fossa hemorrhage with severe hydro.
Pt admitted for non traumatic intracranial hemorrhage.
Subarachnoid ICH, trach/peg, s/p  shunt
IVH/SAH/hydro.
s/p suboccipital crani 11/4  s/p VPS 11/23

## 2022-01-18 NOTE — PROGRESS NOTE ADULT - SUBJECTIVE AND OBJECTIVE BOX
Chief Complaint:  Patient is a 61y old  Male who presents with a chief complaint of Posterior fossa hemorrhage (18 Jan 2022 10:47)      Date of service 01-18-22 @ 12:22      Interval Events:   Patient seen and examined. Rectal tube removed. Had 1 loose BM last night and this morning.     Hospital Medications:  acetaminophen    Suspension .. 650 milliGRAM(s) Enteral Tube every 6 hours PRN  acetylcysteine 20%  Inhalation 4 milliLiter(s) Inhalation every 6 hours  albuterol/ipratropium for Nebulization 3 milliLiter(s) Nebulizer every 6 hours  apixaban 5 milliGRAM(s) Enteral Tube every 12 hours  artificial  tears Solution 1 Drop(s) Both EYES every 6 hours  aspirin  chewable 81 milliGRAM(s) Oral daily  atorvastatin 40 milliGRAM(s) Oral at bedtime  chlorhexidine 0.12% Liquid 15 milliLiter(s) Oral Mucosa two times a day  cholestyramine Powder (Sugar-Free) 4 Gram(s) Oral three times a day  dextrose 50% Injectable 25 Gram(s) IV Push once  dextrose 50% Injectable 12.5 Gram(s) IV Push once  diphenoxylate/atropine 2 Tablet(s) Oral two times a day  doxazosin 2 milliGRAM(s) Oral daily  insulin lispro (ADMELOG) corrective regimen sliding scale   SubCutaneous every 6 hours  insulin NPH human recombinant 12 Unit(s) SubCutaneous every 6 hours  metoprolol tartrate 12.5 milliGRAM(s) Oral two times a day  multivitamin 1 Tablet(s) Oral daily  pantoprazole  Injectable 40 milliGRAM(s) IV Push daily  psyllium Powder 1 Packet(s) Oral daily  sodium chloride 3%  Inhalation 3 milliLiter(s) Inhalation every 6 hours  vancomycin    Solution 125 milliGRAM(s) Oral two times a day        Review of Systems:  unable to obtain    PHYSICAL EXAM:   Vital Signs:  Vital Signs Last 24 Hrs  T(C): 36.6 (18 Jan 2022 08:00), Max: 36.9 (17 Jan 2022 23:33)  T(F): 97.9 (18 Jan 2022 08:00), Max: 98.5 (17 Jan 2022 23:33)  HR: 86 (18 Jan 2022 08:00) (86 - 99)  BP: 138/77 (18 Jan 2022 08:00) (119/75 - 138/77)  BP(mean): --  RR: 18 (18 Jan 2022 08:00) (18 - 18)  SpO2: 100% (18 Jan 2022 08:00) (100% - 100%)  Daily     Daily       PHYSICAL EXAM:     GENERAL:  Appears stated age, well-groomed, well-nourished, no distress  HEENT:  NC/AT,  conjunctivae anicteric, clear and pink, +trach collar   NECK: supple, trachea midline  CHEST:  Full & symmetric excursion, no increased effort, breath sounds clear  HEART:  Regular rhythm, no JVD  ABDOMEN:  Soft, non-tender, non-distended, normoactive bowel sounds,  no masses , no hepatosplenomegaly, +gastrostomy   EXTREMITIES:  no cyanosis,clubbing or edema  SKIN:  No rash, erythema, or, ecchymoses, no jaundice  NEURO:  Alert, non-focal, no asterixis  PSYCH: Appropriate affect, oriented to place and time  RECTAL: Deferred      LABS Personally reviewed by me:                        7.8    8.70  )-----------( 521      ( 18 Jan 2022 07:07 )             25.9     Mean Cell Volume: 95.6 fl (01-18-22 @ 07:07)    01-18    137  |  96  |  19  ----------------------------<  104<H>  4.1   |  30  |  0.50    Ca    9.1      18 Jan 2022 07:07                                    7.8    8.70  )-----------( 521      ( 18 Jan 2022 07:07 )             25.9                         7.2    10.36 )-----------( 462      ( 17 Jan 2022 06:07 )             23.4                         7.7    10.73 )-----------( 471      ( 16 Jan 2022 06:05 )             25.0       Imaging personally reviewed by me:             Chief Complaint:  Patient is a 61y old  Male who presents with a chief complaint of Posterior fossa hemorrhage (18 Jan 2022 10:47)      Date of service 01-18-22 @ 12:22      Interval Events:   Patient seen and examined. Rectal tube removed. Had 1 loose BM last night and this morning.     Hospital Medications:  acetaminophen    Suspension .. 650 milliGRAM(s) Enteral Tube every 6 hours PRN  acetylcysteine 20%  Inhalation 4 milliLiter(s) Inhalation every 6 hours  albuterol/ipratropium for Nebulization 3 milliLiter(s) Nebulizer every 6 hours  apixaban 5 milliGRAM(s) Enteral Tube every 12 hours  artificial  tears Solution 1 Drop(s) Both EYES every 6 hours  aspirin  chewable 81 milliGRAM(s) Oral daily  atorvastatin 40 milliGRAM(s) Oral at bedtime  chlorhexidine 0.12% Liquid 15 milliLiter(s) Oral Mucosa two times a day  cholestyramine Powder (Sugar-Free) 4 Gram(s) Oral three times a day  dextrose 50% Injectable 25 Gram(s) IV Push once  dextrose 50% Injectable 12.5 Gram(s) IV Push once  diphenoxylate/atropine 2 Tablet(s) Oral two times a day  doxazosin 2 milliGRAM(s) Oral daily  insulin lispro (ADMELOG) corrective regimen sliding scale   SubCutaneous every 6 hours  insulin NPH human recombinant 12 Unit(s) SubCutaneous every 6 hours  metoprolol tartrate 12.5 milliGRAM(s) Oral two times a day  multivitamin 1 Tablet(s) Oral daily  pantoprazole  Injectable 40 milliGRAM(s) IV Push daily  psyllium Powder 1 Packet(s) Oral daily  sodium chloride 3%  Inhalation 3 milliLiter(s) Inhalation every 6 hours  vancomycin    Solution 125 milliGRAM(s) Oral two times a day        Review of Systems:  unable to obtain    PHYSICAL EXAM:   Vital Signs:  Vital Signs Last 24 Hrs  T(C): 36.6 (18 Jan 2022 08:00), Max: 36.9 (17 Jan 2022 23:33)  T(F): 97.9 (18 Jan 2022 08:00), Max: 98.5 (17 Jan 2022 23:33)  HR: 86 (18 Jan 2022 08:00) (86 - 99)  BP: 138/77 (18 Jan 2022 08:00) (119/75 - 138/77)  BP(mean): --  RR: 18 (18 Jan 2022 08:00) (18 - 18)  SpO2: 100% (18 Jan 2022 08:00) (100% - 100%)  Daily     Daily       PHYSICAL EXAM:     GENERAL:  Appears stated age, well-groomed, well-nourished, no distress  HEENT:  NC/AT,  conjunctivae anicteric, clear and pink, +trach collar   NECK: supple, trachea midline  CHEST:  Full & symmetric excursion, no increased effort, breath sounds clear  HEART:  Regular rhythm, no JVD  ABDOMEN:  Soft, non-tender, non-distended, normoactive bowel sounds,  no masses , no hepatosplenomegaly, +gastrostomy   EXTREMITIES:  no cyanosis,clubbing or edema  SKIN:  No rash, erythema, or, ecchymoses, no jaundice  NEURO:  non-focal, no asterixis  RECTAL: Deferred      LABS Personally reviewed by me:                        7.8    8.70  )-----------( 521      ( 18 Jan 2022 07:07 )             25.9     Mean Cell Volume: 95.6 fl (01-18-22 @ 07:07)    01-18    137  |  96  |  19  ----------------------------<  104<H>  4.1   |  30  |  0.50    Ca    9.1      18 Jan 2022 07:07                                    7.8    8.70  )-----------( 521      ( 18 Jan 2022 07:07 )             25.9                         7.2    10.36 )-----------( 462      ( 17 Jan 2022 06:07 )             23.4                         7.7    10.73 )-----------( 471      ( 16 Jan 2022 06:05 )             25.0       Imaging personally reviewed by me:

## 2022-01-18 NOTE — PROGRESS NOTE ADULT - PROBLEM SELECTOR PLAN 1
No recent fevers, no leukocytosis, CT chest result noted: complete b/l lower lobe atelectasis and findings concerning for possible aspiration/infection  - patient with copious secretions, episode of hypoxia improved after suctioning  - ID f/u plan noted- completed IV Cefepime and flagyl for possible aspiration pneumonia 5/5 days on 1/8  - follow up blood cultures NGTD  - aspiration precaution  - on Vanco taper via PEG for c diff Resolved  - patient with copious secretions, episode of hypoxia improved after frequent suctioning  - completed IV Cefepime and flagyl for possible aspiration pneumonia 5/5 days on 1/8  - follow up blood cultures NGTD  - aspiration precaution  - on Vanco taper via PEG for c diff

## 2022-01-18 NOTE — PROGRESS NOTE ADULT - PROBLEM SELECTOR PLAN 10
[address] [facility] Adult  Hospitalist Group Hospitalist Progress Note Tyler Duncan MD 
Answering service: [phone] or [phone] from in house phone Date of Service:  3/22/2021 NAME:  Francine Jones Sr. 
:  1937 MRN:  [id number] Admission Summary: This is an 43-year-old man with past medical history significant for chronic diastolic congestive heart failure, oxygen-dependent COPD, type 2 diabetes, hypertension, chronic kidney disease stage IV, paroxysmal atrial fibrillation, and dyslipidemia, who was in his usual state of health until yesterday when the patient developed shortness of breath which is progressive and getting worse. The patient is normally on 3 liters of oxygen at home. As a result of the worsening shortness of breath, he has to increase his supplemental oxygen to 4 liters without any significant improvement in his shortness of breath. Also, the shortness of breath was not relieved with his inhaler. The patient stated that he has been taking his medication as prescribed. The shortness of breath is worse when the patient is lying down and also with activities such as walking. The patient denies fever, rigor, or chills. The patient received his first COVID-19 vaccination last week. Because of worsening symptoms, the patient was taken to [facility] Emergency Room at [facility]. When the patient arrived at the emergency room, the patient was found to have elevated troponin level. The patient was discussed with his cardiologist on-call who advised admission to the hospital.  The patient was then referred to the hospitalist service for evaluation for admission. The patient was recently admitted to this hospital and was discharged from the hospital a couple of days ago. The patient was admitted for evaluation and treatment of congestive heart failure.   During that hospitalization, the patient was also found to have elevated troponin level. He was initially started on heparin. This was later discontinued by the cardiologist and stated that the patient's elevated troponin level was due to demand ischemia. The patient denies fever, rigors, or chills. The patient has cough which according him is chronic. The patient also complained of back pain. The pain is located at the lower back. No history of injury. The low back pain started yesterday on both side of the back. No radiation to the lower extremity, no urinary or fecal incontinence. The pain is constant, dull ache with no known relieving factors, worse with movement. 
  
 
Interval history / Subjective:  
 F/u SOB On 3l NC 
proBNP remains elevated Creatinine stable No fever No leukocytosis Hyperkalemia Upset that he did not get his oatmeal 
  
 
Assessment & Plan: Suspected non-ST elevation myocardial infarction in a patient with CAD s/p CABG 
-Elevated troponins in the setting of marked anemia and renal dysfunction 
-No chest pain 
-Echo EF 65%. Moderate concentric hypertrophy 
-Appreciate cardiology Acute on chronic anemia 
-sec to ? CKD vs GI bleed 
-s/p 1 unit PRBC 3/19, 3/21 
-awaiting FOBT, still not done 
-h/h 
-transfuse PRBC for hb<7 Hyperkalemia 
-Layex and repeat BMP  
-if repeat BMP stable, will discharge the patient Respiratory distress in a patient with chronic hypoxic respiratory failure 
-baseline 3l of oxygen through nasal cannula 
-multifactorial including anemia, COPD, ILD 
-continue home inhalers 
-Incentive spirometry Probable acute-on-chronic diastolic congestive heart failure NYHA III 
-CT chest with multiple bilateral pleural effusions 
-On IV Bumex, will switch to oral 3/22 
-Echo as above 
-[address] Cardiology, cleared COPD with acute exacerbation-Prednisone, continue inhalers, continue doxy Suspected bacterial pneumonia 
-On Ceftriaxone/Doxycycline, will stop ceftriaxone 
-Don't see any evidence of Pneumonia Type 2 diabetes with hyperglycemia-SSI Hypertension-Continue home meds Chronic kidney disease stage IV:  Baseline creatinine 2.5-3. Monitor Paroxysmal atrial fibrillation:  Continue Coreg. S/p [employer] PPM 
Dyslipidemia:  Continue Lipitor. Bilateral leg swelling:  Dopplers negative for DVT Acute low back pain:  No history of trauma. CT abd pelvis unremarkable Diabetic diet PT/OT Because of co-morbidities the patient is at a very high risk of clinical deterioration and readmission Code status: DNR 
DVT prophylaxis: scd Plan: PT/OT, repeat BMP at noon. If K improved, will discharge the patient home Care Plan discussed with: Patient/Family Anticipated Disposition: TBD Anticipated Discharge: 24 hours to 48 hours Hospital Problems  Date Reviewed: 3/19/2021 Codes Class Noted POA * (Principal) NSTEMI (non-ST elevated myocardial infarction) ([facility] [address] ICD-10-CM: I21.4 ICD-9-CM: 410.70  3/18/2021 Yes Review of Systems: A comprehensive review of systems was negative except for that written in the HPI. Vital Signs:  
 Last 24hrs VS reviewed since prior progress note. Most recent are: 
Visit Vitals BP (!) 149/76 (BP 1 Location: Left upper arm, BP Patient Position: At rest) Pulse 89 Temp 97.9 °F (36.6 °C) Resp 25 Ht 5' 9\" (1.753 m) Wt 65.2 kg (143 lb 12.8 oz) SpO2 95% BMI 21.24 kg/m² Intake/Output Summary (Last 24 hours) at 3/22/2021 248 Last data filed at 3/22/2021 0853 Gross per 24 hour Intake 372.5 ml Output 680 ml Net -307.5 ml Physical Examination:  
 
I had a face to face encounter with this patient and independently examined them on 3/22/2021 as outlined below: 
 
     
Constitutional:  No acute distress ENT:  Oral mucosa moist, oropharynx benign. Resp:  CTA bilaterally. No wheezing/rhonchi/rales. No accessory muscle use CV:  Regular rhythm, normal rate, no murmurs, gallops, rubs GI:  Soft, non distended, non tender. normoactive bowel sounds, no hepatosplenomegaly Musculoskeletal:  No edema, warm, 2+ pulses throughout Neurologic:  Moves all extremities. AAOx3, CN II-XII reviewed Skin:  Good turgor, no rashes or ulcers Data Review:  
 Review and/or order of clinical lab test 
 
 
Labs:  
 
Recent Labs  
  03/22/21 
0614 03/21/21 
0323 WBC 11.0 8.7 HGB 8.2* 7.2* HCT 24.6* 22.0*  
* 154 Recent Labs  
  03/22/21 
5029 03/21/21 
0323 03/20/21 
0231 * 132* 132* K 6.0* 5.2* 5.2*  
CL 99 98 98 CO2 22 23 24 * 158* 153* CREA 3.09* 3.19* 3.15* * 194* 211* CA 8.1* 8.0* 8.5 No results for input(s): ALT, AP, TBIL, TBILI, TP, ALB, GLOB, GGT, AML, LPSE in the last 72 hours. No lab exists for component: SGOT, GPT, AMYP, HLPSE No results for input(s): INR, PTP, APTT, INREXT, INREXT in the last 72 hours. No results for input(s): FE, TIBC, PSAT, FERR in the last 72 hours. Lab Results Component Value Date/Time Folate 31.9 (H) 03/19/2021 02:27 AM  
  
No results for input(s): PH, PCO2, PO2 in the last 72 hours. Recent Labs  
  03/20/21 
1000 TROIQ 0.33* No results found for: CHOL, CHOLX, CHLST, CHOLV, HDL, HDLP, LDL, LDLC, DLDLP, TGLX, TRIGL, TRIGP, CHHD, CHHDX Lab Results Component Value Date/Time Glucose (POC) 313 (H) 03/22/2021 08:20 AM  
 Glucose (POC) 411 (H) 03/21/2021 09:13 PM  
 Glucose (POC) 332 (H) 03/21/2021 04:35 PM  
 Glucose (POC) 230 (H) 03/21/2021 11:47 AM  
 Glucose (POC) 313 (H) 03/21/2021 08:19 AM  
 
Lab Results Component Value Date/Time  Color YELLOW/STRAW 08/27/2019 12:56 PM  
 Appearance CLEAR 08/27/2019 12:56 PM  
 Specific gravity 1.023 08/27/2019 12:56 PM  
 pH (UA) 6.0 08/27/2019 12:56 PM  
 Protein >300 (A) 08/27/2019 12:56 PM  
 Glucose 500 (A) 08/27/2019 12:56 PM  
 Ketone NEGATIVE  08/27/2019 12:56 PM  
 Bilirubin NEGATIVE  08/27/2019 12:56 PM  
 Urobilinogen 0.2 08/27/2019 12:56 PM  
 Nitrites NEGATIVE  08/27/2019 12:56 PM  
 Leukocyte Esterase NEGATIVE  08/27/2019 12:56 PM  
 Epithelial cells FEW 08/27/2019 12:56 PM  
 Bacteria NEGATIVE  08/27/2019 12:56 PM  
 WBC 0-4 08/27/2019 12:56 PM  
 RBC 5-10 08/27/2019 12:56 PM  
 
 
 
Medications Reviewed:  
 
Current Facility-Administered Medications Medication Dose Route Frequency  sodium polystyrene (KAYEXALATE) 15 gram/60 mL oral suspension 15 g  15 g Oral NOW  
 [START ON 3/23/2021] bumetanide (BUMEX) tablet 2 mg  2 mg Oral BID  
 0.9% sodium chloride infusion 250 mL  250 mL IntraVENous PRN  
 albuterol-ipratropium (DUO-NEB) 2.5 MG-0.5 MG/3 ML  3 mL Nebulization Q4H PRN  
 amLODIPine (NORVASC) tablet 5 mg  5 mg Oral DAILY  carvediloL (COREG) tablet 6.25 mg  6.25 mg Oral BID WITH MEALS  cyanocobalamin (VITAMIN B12) tablet 1,000 mcg  1,000 mcg Oral DAILY  isosorbide dinitrate (ISORDIL) tablet 20 mg  20 mg Oral TID  magnesium oxide (MAG-OX) tablet 400 mg  400 mg Oral DAILY  atorvastatin (LIPITOR) tablet 10 mg  10 mg Oral QHS  tamsulosin (FLOMAX) capsule 0.4 mg  0.4 mg Oral DAILY  sodium chloride (NS) flush 5-40 mL  5-40 mL IntraVENous Q8H  
 sodium chloride (NS) flush 5-40 mL  5-40 mL IntraVENous PRN  
 acetaminophen (TYLENOL) tablet 650 mg  650 mg Oral Q6H PRN Or  
 acetaminophen (TYLENOL) suppository 650 mg  650 mg Rectal Q6H PRN  polyethylene glycol (MIRALAX) packet 17 g  17 g Oral DAILY PRN  promethazine (PHENERGAN) tablet 12.5 mg  12.5 mg Oral Q6H PRN Or  
 ondansetron (ZOFRAN) injection 4 mg  4 mg IntraVENous Q6H PRN  
 L.acidophilus-paracasei-S.thermophil-bifidobacter (RISAQUAD) 8 billion cell capsule  1 Cap Oral DAILY  doxycycline (VIBRAMYCIN) 100 mg in 0.9% sodium chloride (MBP/ADV) 100 mL MBP  100 mg IntraVENous Q12H  predniSONE (DELTASONE) tablet 40 mg  40 mg Oral DAILY WITH BREAKFAST  insulin lispro (HUMALOG) injection   SubCUTAneous AC&HS  
 glucose chewable HbA1c 6.5%.   continue NPH 12 units q6h.   monitor FS glucose.  FS acceptable tablet 16 g  4 Tab Oral PRN  
 dextrose (D50W) injection syrg 12.5-25 g  12.5-25 g IntraVENous PRN  
 glucagon (GLUCAGEN) injection 1 mg  1 mg IntraMUSCular PRN  
 0.9% sodium chloride infusion 250 mL  250 mL IntraVENous PRN  
 lidocaine 4 % patch 1 Patch  1 Patch TransDERmal Q24H  
 HYDROmorphone (PF) (DILAUDID) injection 0.5 mg  0.5 mg IntraVENous Q4H PRN  
 
______________________________________________________________________ EXPECTED LENGTH OF STAY: 4d 0h 
ACTUAL LENGTH OF STAY:          4 Nadir Sommer MD

## 2022-01-18 NOTE — PROGRESS NOTE ADULT - SUBJECTIVE AND OBJECTIVE BOX
Andre Reyes, M.D.  Pager: [phone]  Office: [phone]    Patient is a 61y old  Male who presents with a chief complaint of Posterior fossa hemorrhage (17 Jan 2022 12:43)          SUBJECTIVE / OVERNIGHT EVENTS:    No acute overnight events.    ROS: ( - ) Fever, ( - )Chills,  ( - )Nausea/Vomiting, ( - ) Cough, ( - )Shortness of breath, ( - )Chest Pain    MEDICATIONS  (STANDING):  acetylcysteine 20%  Inhalation 4 milliLiter(s) Inhalation every 6 hours  albuterol/ipratropium for Nebulization 3 milliLiter(s) Nebulizer every 6 hours  apixaban 5 milliGRAM(s) Enteral Tube every 12 hours  artificial  tears Solution 1 Drop(s) Both EYES every 6 hours  aspirin  chewable 81 milliGRAM(s) Oral daily  atorvastatin 40 milliGRAM(s) Oral at bedtime  chlorhexidine 0.12% Liquid 15 milliLiter(s) Oral Mucosa two times a day  cholestyramine Powder (Sugar-Free) 4 Gram(s) Oral three times a day  dextrose 50% Injectable 25 Gram(s) IV Push once  dextrose 50% Injectable 12.5 Gram(s) IV Push once  diphenoxylate/atropine 2 Tablet(s) Oral two times a day  doxazosin 2 milliGRAM(s) Oral daily  insulin lispro (ADMELOG) corrective regimen sliding scale   SubCutaneous every 6 hours  insulin NPH human recombinant 12 Unit(s) SubCutaneous every 6 hours  metoprolol tartrate 12.5 milliGRAM(s) Oral two times a day  multivitamin 1 Tablet(s) Oral daily  pantoprazole  Injectable 40 milliGRAM(s) IV Push daily  psyllium Powder 1 Packet(s) Oral daily  sodium chloride 3%  Inhalation 3 milliLiter(s) Inhalation every 6 hours  vancomycin    Solution 125 milliGRAM(s) Oral two times a day    MEDICATIONS  (PRN):  acetaminophen    Suspension .. 650 milliGRAM(s) Enteral Tube every 6 hours PRN Temp greater or equal to 38C (100.4F), Mild Pain (1 - 3)          T(C): 36.6 (01-18 @ 08:00), Max: 36.9 (01-17 @ 23:33)   HR: 86   BP: 138/77   RR: 18   SpO2: 100%    PHYSICAL EXAM:      LABS:                        7.8    8.70  )-----------( 521      ( 18 Jan 2022 07:07 )             25.9      01-18    137  |  96  |  19  ----------------------------<  104<H>  4.1   |  30  |  0.50    Ca    9.1      18 Jan 2022 07:07         CAPILLARY BLOOD GLUCOSE      POCT Blood Glucose.: 123 mg/dL (18 Jan 2022 05:49)  POCT Blood Glucose.: 111 mg/dL (18 Jan 2022 00:10)  POCT Blood Glucose.: 144 mg/dL (17 Jan 2022 16:41)  POCT Blood Glucose.: 146 mg/dL (17 Jan 2022 11:42)      RADIOLOGY & ADDITIONAL TESTS:    Imaging Personally Reviewed:  Consultant(s) Notes Reviewed:    Care Discussed with Consultants/Other Providers:   Andre Reyes, M.D.  Pager: [phone]  Office: [phone]    Patient is a 61y old  Male who presents with a chief complaint of Posterior fossa hemorrhage (17 Jan 2022 12:43)          SUBJECTIVE / OVERNIGHT EVENTS:    No acute overnight events.  Per RN at bedside, Stools seem more formed.  Unable to give ROS due to cognitive impairment      MEDICATIONS  (STANDING):  acetylcysteine 20%  Inhalation 4 milliLiter(s) Inhalation every 6 hours  albuterol/ipratropium for Nebulization 3 milliLiter(s) Nebulizer every 6 hours  apixaban 5 milliGRAM(s) Enteral Tube every 12 hours  artificial  tears Solution 1 Drop(s) Both EYES every 6 hours  aspirin  chewable 81 milliGRAM(s) Oral daily  atorvastatin 40 milliGRAM(s) Oral at bedtime  chlorhexidine 0.12% Liquid 15 milliLiter(s) Oral Mucosa two times a day  cholestyramine Powder (Sugar-Free) 4 Gram(s) Oral three times a day  dextrose 50% Injectable 25 Gram(s) IV Push once  dextrose 50% Injectable 12.5 Gram(s) IV Push once  diphenoxylate/atropine 2 Tablet(s) Oral two times a day  doxazosin 2 milliGRAM(s) Oral daily  insulin lispro (ADMELOG) corrective regimen sliding scale   SubCutaneous every 6 hours  insulin NPH human recombinant 12 Unit(s) SubCutaneous every 6 hours  metoprolol tartrate 12.5 milliGRAM(s) Oral two times a day  multivitamin 1 Tablet(s) Oral daily  pantoprazole  Injectable 40 milliGRAM(s) IV Push daily  psyllium Powder 1 Packet(s) Oral daily  sodium chloride 3%  Inhalation 3 milliLiter(s) Inhalation every 6 hours  vancomycin    Solution 125 milliGRAM(s) Oral two times a day    MEDICATIONS  (PRN):  acetaminophen    Suspension .. 650 milliGRAM(s) Enteral Tube every 6 hours PRN Temp greater or equal to 38C (100.4F), Mild Pain (1 - 3)          T(C): 36.6 (01-18 @ 08:00), Max: 36.9 (01-17 @ 23:33)   HR: 86   BP: 138/77   RR: 18   SpO2: 100%    PHYSICAL EXAM:    CONSTITUTIONAL: Chronically ill appearing male laying in bed in NAD  EYES: Opening eyes, clear sclera  NECK: +Trach  RESPIRATORY: Normal respiratory effort; rhonchi bilaterally in upper lung fields  CARDIOVASCULAR: Regular rate and rhythm, normal S1 and S2, no murmur/rub/gallop; No lower extremity edema  ABDOMEN: Nontender to palpation, normoactive bowel sounds, no rebound/guarding, +PEG, + rectal tube  MUSCULOSKELETAL: No clubbing or cyanosis of digits; no joint swelling or tenderness to palpation  PSYCH: Awake, alert, unable to assess orientation as patient nonverbal  NEUROLOGY: Able to make fist with both hands, infrequently able to follow simple commands  SKIN: + sacral ulcer    LABS:                        7.8    8.70  )-----------( 521      ( 18 Jan 2022 07:07 )             25.9      01-18    137  |  96  |  19  ----------------------------<  104<H>  4.1   |  30  |  0.50    Ca    9.1      18 Jan 2022 07:07         CAPILLARY BLOOD GLUCOSE      POCT Blood Glucose.: 123 mg/dL (18 Jan 2022 05:49)  POCT Blood Glucose.: 111 mg/dL (18 Jan 2022 00:10)  POCT Blood Glucose.: 144 mg/dL (17 Jan 2022 16:41)  POCT Blood Glucose.: 146 mg/dL (17 Jan 2022 11:42)      RADIOLOGY & ADDITIONAL TESTS:    Imaging Personally Reviewed:  Consultant(s) Notes Reviewed:    Care Discussed with Consultants/Other Providers:

## 2022-01-18 NOTE — PROGRESS NOTE ADULT - PROBLEM SELECTOR PLAN 11
CT a/p with a 2.4 cm nodular soft tissue density in the bladder appears separate from the prostate gland, concerning for bladder lesion  - as per urology - findings concerning for bladder tumor vs prostate gland. cysto transurethral resection would require general anesthesia. recommended checking urine cytology to determine next steps which returned negative. will still need outpatient urology follow-up regardless once acute issues improve  - PSA normal, urine cytology negative for malignancy  - previously spoke to the patient's wife, Sandrita Bauer [phone]. Given the patient's underlying comorbid conditions coupled with his recent devastating neurologic events, other medical complications that ensued, and his poor neurologic and functional status at this time, it may be prudent to monitor his progress and see if he makes any meaningful recovery over the coming weeks to months to decide whether the benefit of pursuing any invasive work up for the bladder lesion outweighs the risk. Spouse seems to be in agreement.  - Will require eventual outpatient f/u with Urology

## 2022-01-18 NOTE — PROGRESS NOTE ADULT - SUBJECTIVE AND OBJECTIVE BOX
[facility]-- WOUND TEAM -- FOLLOW UP NOTE  --------------------------------------------------------------------------------    24 hour events/subjective:    tolerating TF  incontinent  afebrile  C diff   palliative consult appreciated- ongoing [facility]    Diet:  Diet, NPO with Tube Feed:   Tube Feeding Modality: Gastrostomy  Glucerna 1.2 Blake (GLUCERNARTH)  Total Volume for 24 Hours (mL): 1800  Continuous  Until Goal Tube Feed Rate (mL per Hour): 75  Tube Feed Duration (in Hours): 24  Tube Feed Start Time: 12:00  Free Water Flush  Bolus   Total Volume per Flush (mL): 250   Frequency: Every 6 Hours  Charly(7 Gm Arginine/7 Gm Glut/1.2 Gm HMB     Qty per Day:  2  Banatrol TF     Qty per Day:  4  Supplement Feeding Modality:  Gastrostomy  Probiotic Yogurt/Smoothie Cans or Servings Per Day:  2       Frequency:  Daily (01-16-22 @ 16:23)      ROS: pt unable to offer    ALLERGIES & MEDICATIONS  --------------------------------------------------------------------------------  Allergies  penicillin (Other)      STANDING INPATIENT MEDICATIONS  acetylcysteine 20%  Inhalation 4 milliLiter(s) Inhalation every 6 hours  albuterol/ipratropium for Nebulization 3 milliLiter(s) Nebulizer every 6 hours  apixaban 5 milliGRAM(s) Enteral Tube every 12 hours  artificial  tears Solution 1 Drop(s) Both EYES every 6 hours  aspirin  chewable 81 milliGRAM(s) Oral daily  atorvastatin 40 milliGRAM(s) Oral at bedtime  chlorhexidine 0.12% Liquid 15 milliLiter(s) Oral Mucosa two times a day  cholestyramine Powder (Sugar-Free) 4 Gram(s) Oral three times a day  dextrose 50% Injectable 25 Gram(s) IV Push once  dextrose 50% Injectable 12.5 Gram(s) IV Push once  diphenoxylate/atropine 2 Tablet(s) Oral three times a day  doxazosin 2 milliGRAM(s) Oral daily  insulin lispro (ADMELOG) corrective regimen sliding scale   SubCutaneous every 6 hours  insulin NPH human recombinant 12 Unit(s) SubCutaneous every 6 hours  metoprolol tartrate 12.5 milliGRAM(s) Oral two times a day  multivitamin 1 Tablet(s) Oral daily  pantoprazole  Injectable 40 milliGRAM(s) IV Push daily  psyllium Powder 1 Packet(s) Oral daily  sodium chloride 3%  Inhalation 3 milliLiter(s) Inhalation every 6 hours  vancomycin    Solution 125 milliGRAM(s) Oral two times a day      PRN INPATIENT MEDICATION  acetaminophen    Suspension .. 650 milliGRAM(s) Enteral Tube every 6 hours PRN        VITALS/PHYSICAL EXAM  --------------------------------------------------------------------------------  T(C): 36.9 (01-18-22 @ 16:02), Max: 36.9 (01-17-22 @ 23:33)  HR: 88 (01-18-22 @ 16:02) (86 - 99)  BP: 143/81 (01-18-22 @ 16:02) (119/75 - 143/81)  RR: 18 (01-18-22 @ 16:02) (18 - 18)  SpO2: 100% (01-18-22 @ 16:02) (100% - 100%)  Wt(kg): --        01-17-22 @ 07:01  -  01-18-22 @ 07:00  --------------------------------------------------------  IN: 2255 mL / OUT: 800 mL / NET: 1455 mL    01-18-22 @ 07:01 - 01-18-22 @ 18:24  --------------------------------------------------------  IN: 0 mL / OUT: 1001 mL / NET: -1001 mL    Physical Exam:  General: NAD frail  Versa Care P500  HEENT: NC/AT, PERRLA, mucosa moist, trach collar, neck supple  Gastrointestinal: soft NT/ND (+)PEG  (+)FMS  Neurology: nonverbal, not follow commands  Musculoskeletal: no contractures  Vascular: BLE edema equal, equally warm  Skin: moist w/ good turogr   Sacral/bilateral buttocks 5cm x 4cm x 0.1cm w/ denuded and partial thickness skin          loss w/ improving evolving DTI         scant serosanguinous drainage,         periwound skin is intact w/o blistering - no longer macerated and erythematous.     Bilateral groins intact pale erythema w/o drainage or blistering  No odor, increased warmth, tenderness, induration, fluctuance          LABS/ CULTURES/ RADIOLOGY:              7.8    8.70  >-----------<  521      [01-18-22 @ 07:07]              25.9     137  |  96  |  19  ----------------------------<  104      [01-18-22 @ 07:07]  4.1   |  30  |  0.50        Ca     9.1     [01-18-22 @ 07:07]        CAPILLARY BLOOD GLUCOSE  POCT Blood Glucose.: 153 mg/dL (18 Jan 2022 17:35)  POCT Blood Glucose.: 125 mg/dL (18 Jan 2022 13:26)  POCT Blood Glucose.: 115 mg/dL (18 Jan 2022 11:52)  POCT Blood Glucose.: 123 mg/dL (18 Jan 2022 05:49)  POCT Blood Glucose.: 111 mg/dL (18 Jan 2022 00:10)      A1C with Estimated Average Glucose Result: 6.5 % (11-04-21 @ 21:43)

## 2022-01-18 NOTE — PROGRESS NOTE ADULT - ASSESSMENT
61 year old man with respiratory failure d/t ICH    1. ICH  -per neurosurgery  -s/p  shunt    2. Respiratory failure  - for tracheostomy, will require long term enteral nutrition  - s/p PEG, continue TF and use of abdominal binder  - banatrol added for loose stool    3. Enterobacter PNA  -s/p course of antibiotics     4. Anemia, no overt GI bleed. EGD unremarkable. Anemia of chronic disease  -hgb stable  -monitor for GI bleeding     5. Cdiff infection   -rectal tube removed, had small loose brown BM last night and this morning  -on vanco taper  -doubt active cdiff at this time  -lomotil added     6. DVT on a/c  -apixaban 5mg resumed  -continue PPI     7. Pneumothorax  -per CTS    Attending supervision statement: I have personally seen and examined the patient. I fully participated in the care of this patient. I have made amendments to the documentation where necessary, and agree with the history, physical exam, and plan as outlined by the ACP.      [facility]  Gastroenterology and Hepatology  [address] [place], [place]  Office: [phone]  Cell: [phone]

## 2022-01-18 NOTE — PROVIDER CONTACT NOTE (OTHER) - RECOMMENDATIONS
provider to bedside
Stop feeds.
notify provider
Scan or monitor through night and scan in morning
notify proivder. CXR
MD to assess patient at bedside
line re-zeroed. ICP remains 0 to -5
reassess patient q15min?
Fluids?
Have provider come to bedside?
Hold tube feeds for 1 hr. No stool softeners.
No recommendations at this time as per MD Kelley

## 2022-01-18 NOTE — PROGRESS NOTE ADULT - ASSESSMENT
A/p 61M with PMH of HTN, CAD s/p stent on DAPT, T2DM who complained of headache and subsequently became unresponsive found to have posterior fossa hemorrhage, IVH, hydrocephalus, s/p EVD and SOC on 11/4. 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 Course c/b respiratory failure and dysphagia s/p trach/PEG (11/19), VPS (11/23), completed course of cefepime for enterobacter and pseudomonas pneumonia on 11/21, fevers, c. diff colitis, urinary retention, b/l distal DVT. New fevers again on 12/22 with diarrhea found to have recurrent c. diff infection.    Wound Consult to assist w/ management of   Sacral/bilateral Buttocks evolving deep tissue injury  Incontinence of bowel  Incontinence Dermatitis      1.) sacral/buttock injury -Allevyn       Bilateral groins - InterdryAg after cleansing   2.) Incontinence Management - continue pericare BID, w/ assist of attends underpads            and fecal management system  as per protocol       persistent diarrhea- improving w/ probiotic, banatrol, & senna  3.) Maintain on an alternating air with low air loss surface  4.) Turn and reposition Q 2 hours  5.) Nutrition optimization w/ Moderate Malnutrition, Increased Nutrient Needs w/ TF, vit c, mvi, luz marina, & probiotics  6.) Hyperglycemia improving w/ glucerna TF and NPH insulin w/ FS & ISS  7.) Abx as per ID for c diff  8.) Offload heels/feet with complete cair air fluidized boots; ensure that the soles of the feet are not resting on the foot board of the bed.  Care as per medicine, Will continue to follow with you  Upon discharge f/u as outpatient at [facility] [address] [facility] [phone]  d/w team & RN  Rosario Gongora PA-C, CWS [id number]  I spent 25mnutes face to face w/ this pt of which more than 50% of the time was spent counseling & coordinating care of this pt.

## 2022-01-19 LAB
HCT VFR BLD CALC: 22 % — LOW (ref 39–50)
HCT VFR BLD CALC: 22.5 % — LOW (ref 39–50)
HCT VFR BLD CALC: 25.1 % — LOW (ref 39–50)
HGB BLD-MCNC: 6.9 G/DL — CRITICAL LOW (ref 13–17)
HGB BLD-MCNC: 6.9 G/DL — CRITICAL LOW (ref 13–17)
HGB BLD-MCNC: 7.9 G/DL — LOW (ref 13–17)
MCHC RBC-ENTMCNC: 28.5 PG — SIGNIFICANT CHANGE UP (ref 27–34)
MCHC RBC-ENTMCNC: 28.5 PG — SIGNIFICANT CHANGE UP (ref 27–34)
MCHC RBC-ENTMCNC: 28.8 PG — SIGNIFICANT CHANGE UP (ref 27–34)
MCHC RBC-ENTMCNC: 30.7 GM/DL — LOW (ref 32–36)
MCHC RBC-ENTMCNC: 31.4 GM/DL — LOW (ref 32–36)
MCHC RBC-ENTMCNC: 31.5 GM/DL — LOW (ref 32–36)
MCV RBC AUTO: 90.6 FL — SIGNIFICANT CHANGE UP (ref 80–100)
MCV RBC AUTO: 91.7 FL — SIGNIFICANT CHANGE UP (ref 80–100)
MCV RBC AUTO: 93 FL — SIGNIFICANT CHANGE UP (ref 80–100)
NRBC # BLD: 0 /100 WBCS — SIGNIFICANT CHANGE UP (ref 0–0)
PLATELET # BLD AUTO: 461 K/UL — HIGH (ref 150–400)
PLATELET # BLD AUTO: 509 K/UL — HIGH (ref 150–400)
PLATELET # BLD AUTO: 515 K/UL — HIGH (ref 150–400)
RBC # BLD: 2.4 M/UL — LOW (ref 4.2–5.8)
RBC # BLD: 2.42 M/UL — LOW (ref 4.2–5.8)
RBC # BLD: 2.77 M/UL — LOW (ref 4.2–5.8)
RBC # FLD: 13.6 % — SIGNIFICANT CHANGE UP (ref 10.3–14.5)
RBC # FLD: 13.7 % — SIGNIFICANT CHANGE UP (ref 10.3–14.5)
RBC # FLD: 14.5 % — SIGNIFICANT CHANGE UP (ref 10.3–14.5)
WBC # BLD: 7.78 K/UL — SIGNIFICANT CHANGE UP (ref 3.8–10.5)
WBC # BLD: 8.22 K/UL — SIGNIFICANT CHANGE UP (ref 3.8–10.5)
WBC # BLD: 8.56 K/UL — SIGNIFICANT CHANGE UP (ref 3.8–10.5)
WBC # FLD AUTO: 7.78 K/UL — SIGNIFICANT CHANGE UP (ref 3.8–10.5)
WBC # FLD AUTO: 8.22 K/UL — SIGNIFICANT CHANGE UP (ref 3.8–10.5)
WBC # FLD AUTO: 8.56 K/UL — SIGNIFICANT CHANGE UP (ref 3.8–10.5)

## 2022-01-19 PROCEDURE — 99232 SBSQ HOSP IP/OBS MODERATE 35: CPT

## 2022-01-19 PROCEDURE — 99233 SBSQ HOSP IP/OBS HIGH 50: CPT

## 2022-01-19 RX ORDER — VANCOMYCIN HCL 1 G
125 VIAL (EA) INTRAVENOUS DAILY
Refills: 0 | Status: COMPLETED | OUTPATIENT
Start: 2022-01-20 | End: 2022-01-26

## 2022-01-19 RX ORDER — VANCOMYCIN HCL 1 G
125 VIAL (EA) INTRAVENOUS
Refills: 0 | Status: CANCELLED | OUTPATIENT
Start: 2022-01-28 | End: 2022-01-26

## 2022-01-19 RX ADMIN — Medication 12.5 MILLIGRAM(S): at 05:10

## 2022-01-19 RX ADMIN — Medication 4 MILLILITER(S): at 17:43

## 2022-01-19 RX ADMIN — Medication 1 DROP(S): at 23:26

## 2022-01-19 RX ADMIN — SODIUM CHLORIDE 3 MILLILITER(S): 9 INJECTION INTRAMUSCULAR; INTRAVENOUS; SUBCUTANEOUS at 17:41

## 2022-01-19 RX ADMIN — Medication 1 TABLET(S): at 11:16

## 2022-01-19 RX ADMIN — SODIUM CHLORIDE 3 MILLILITER(S): 9 INJECTION INTRAMUSCULAR; INTRAVENOUS; SUBCUTANEOUS at 05:15

## 2022-01-19 RX ADMIN — PANTOPRAZOLE SODIUM 40 MILLIGRAM(S): 20 TABLET, DELAYED RELEASE ORAL at 11:16

## 2022-01-19 RX ADMIN — HUMAN INSULIN 12 UNIT(S): 100 INJECTION, SUSPENSION SUBCUTANEOUS at 23:26

## 2022-01-19 RX ADMIN — Medication 3 MILLILITER(S): at 05:14

## 2022-01-19 RX ADMIN — Medication 2 TABLET(S): at 13:27

## 2022-01-19 RX ADMIN — Medication 4 MILLILITER(S): at 06:47

## 2022-01-19 RX ADMIN — SODIUM CHLORIDE 3 MILLILITER(S): 9 INJECTION INTRAMUSCULAR; INTRAVENOUS; SUBCUTANEOUS at 23:27

## 2022-01-19 RX ADMIN — CHOLESTYRAMINE 4 GRAM(S): 4 POWDER, FOR SUSPENSION ORAL at 23:25

## 2022-01-19 RX ADMIN — APIXABAN 5 MILLIGRAM(S): 2.5 TABLET, FILM COATED ORAL at 17:41

## 2022-01-19 RX ADMIN — CHLORHEXIDINE GLUCONATE 15 MILLILITER(S): 213 SOLUTION TOPICAL at 17:42

## 2022-01-19 RX ADMIN — Medication 3 MILLILITER(S): at 11:15

## 2022-01-19 RX ADMIN — HUMAN INSULIN 12 UNIT(S): 100 INJECTION, SUSPENSION SUBCUTANEOUS at 00:28

## 2022-01-19 RX ADMIN — Medication 3 MILLILITER(S): at 17:41

## 2022-01-19 RX ADMIN — Medication 1 DROP(S): at 11:14

## 2022-01-19 RX ADMIN — Medication 1 DROP(S): at 05:11

## 2022-01-19 RX ADMIN — CHLORHEXIDINE GLUCONATE 15 MILLILITER(S): 213 SOLUTION TOPICAL at 05:15

## 2022-01-19 RX ADMIN — Medication 4 MILLILITER(S): at 00:32

## 2022-01-19 RX ADMIN — SODIUM CHLORIDE 3 MILLILITER(S): 9 INJECTION INTRAMUSCULAR; INTRAVENOUS; SUBCUTANEOUS at 00:29

## 2022-01-19 RX ADMIN — Medication 3 MILLILITER(S): at 00:29

## 2022-01-19 RX ADMIN — Medication 1 DROP(S): at 00:29

## 2022-01-19 RX ADMIN — CHOLESTYRAMINE 4 GRAM(S): 4 POWDER, FOR SUSPENSION ORAL at 13:23

## 2022-01-19 RX ADMIN — Medication 125 MILLIGRAM(S): at 05:10

## 2022-01-19 RX ADMIN — Medication 81 MILLIGRAM(S): at 11:28

## 2022-01-19 RX ADMIN — Medication 3 MILLILITER(S): at 23:27

## 2022-01-19 RX ADMIN — Medication 4 MILLILITER(S): at 11:18

## 2022-01-19 RX ADMIN — Medication 2 MILLIGRAM(S): at 05:10

## 2022-01-19 RX ADMIN — HUMAN INSULIN 12 UNIT(S): 100 INJECTION, SUSPENSION SUBCUTANEOUS at 11:45

## 2022-01-19 RX ADMIN — HUMAN INSULIN 12 UNIT(S): 100 INJECTION, SUSPENSION SUBCUTANEOUS at 17:40

## 2022-01-19 RX ADMIN — Medication 12.5 MILLIGRAM(S): at 17:42

## 2022-01-19 RX ADMIN — ATORVASTATIN CALCIUM 40 MILLIGRAM(S): 80 TABLET, FILM COATED ORAL at 23:26

## 2022-01-19 RX ADMIN — Medication 2 TABLET(S): at 05:10

## 2022-01-19 RX ADMIN — APIXABAN 5 MILLIGRAM(S): 2.5 TABLET, FILM COATED ORAL at 05:10

## 2022-01-19 RX ADMIN — SODIUM CHLORIDE 3 MILLILITER(S): 9 INJECTION INTRAMUSCULAR; INTRAVENOUS; SUBCUTANEOUS at 11:15

## 2022-01-19 RX ADMIN — HUMAN INSULIN 12 UNIT(S): 100 INJECTION, SUSPENSION SUBCUTANEOUS at 05:11

## 2022-01-19 RX ADMIN — CHOLESTYRAMINE 4 GRAM(S): 4 POWDER, FOR SUSPENSION ORAL at 05:10

## 2022-01-19 RX ADMIN — Medication 1 DROP(S): at 17:40

## 2022-01-19 NOTE — PROGRESS NOTE ADULT - PROBLEM SELECTOR PLAN 11
CT a/p with a 2.4 cm nodular soft tissue density in the bladder appears separate from the prostate gland, concerning for bladder lesion  - as per urology - findings concerning for bladder tumor vs prostate gland. cysto transurethral resection would require general anesthesia. recommended checking urine cytology to determine next steps which returned negative. will still need outpatient urology follow-up regardless once acute issues improve  - PSA normal, urine cytology negative for malignancy  - previously spoke to the patient's wife, Sandrita Bauer [phone]. Given the patient's underlying comorbid conditions coupled with his recent devastating neurologic events, other medical complications that ensued, and his poor neurologic and functional status at this time, it may be prudent to monitor his progress and see if he makes any meaningful recovery over the coming weeks to months to decide whether the benefit of pursuing any invasive work up for the bladder lesion outweighs the risk. Spouse seems to be in agreement.  - Will require eventual outpatient f/u with Urology CT a/p with a 2.4 cm nodular soft tissue density in the bladder appears separate from the prostate gland, concerning for bladder lesion  - as per urology - findings concerning for bladder tumor vs prostate gland. cysto transurethral resection would require general anesthesia. recommended checking urine cytology to determine next steps which returned negative. will still need outpatient urology follow-up regardless once acute issues improve  - PSA normal, urine cytology negative for malignancy  - previously spoke to the patient's wife, Sandrita Bauer [phone]. Given the patient's underlying comorbid conditions coupled with his recent devastating neurologic events, other medical complications that ensued, and his poor neurologic and functional status at this time, it may be prudent to monitor his progress and see if he makes any meaningful recovery over the coming weeks to months to decide whether the benefit of pursuing any invasive work up for the bladder lesion outweighs the risk. Wife in agreement.  - Will require eventual outpatient f/u with Urology

## 2022-01-19 NOTE — CHART NOTE - NSCHARTNOTEFT_GEN_A_CORE
Hgb noted to drop 8 > 6.9, repeat was 6.9 in am, ordered for 1 unit transfusion. VSS. Per nurse, there was bleeding from trach day prior but no bleeding today.  On exam, no overt signs of bleeding.  Will monitor Hgb post transfusion, obtain FOBT.  Discussed with medicine attending, Dr. Reyes. Given patient remains HDS and no overt signs, will continue AC for now.

## 2022-01-19 NOTE — PROGRESS NOTE ADULT - SUBJECTIVE AND OBJECTIVE BOX
ENT ISSUE/POD: trach bleed    HPI: 60yo male s/p tracheostomy on 11/19/21, s/p trach bleed yesterday. #7 portex uncuffed        PAST MEDICAL & SURGICAL HISTORY:  HTN (hypertension)    Diabetes mellitus      Allergies    penicillin (Other)    Intolerances      MEDICATIONS  (STANDING):  acetylcysteine 20%  Inhalation 4 milliLiter(s) Inhalation every 6 hours  albuterol/ipratropium for Nebulization 3 milliLiter(s) Nebulizer every 6 hours  apixaban 5 milliGRAM(s) Enteral Tube every 12 hours  artificial  tears Solution 1 Drop(s) Both EYES every 6 hours  aspirin  chewable 81 milliGRAM(s) Oral daily  atorvastatin 40 milliGRAM(s) Oral at bedtime  chlorhexidine 0.12% Liquid 15 milliLiter(s) Oral Mucosa two times a day  cholestyramine Powder (Sugar-Free) 4 Gram(s) Oral three times a day  dextrose 50% Injectable 25 Gram(s) IV Push once  dextrose 50% Injectable 12.5 Gram(s) IV Push once  diphenoxylate/atropine 2 Tablet(s) Oral three times a day  doxazosin 2 milliGRAM(s) Oral daily  insulin lispro (ADMELOG) corrective regimen sliding scale   SubCutaneous every 6 hours  insulin NPH human recombinant 12 Unit(s) SubCutaneous every 6 hours  metoprolol tartrate 12.5 milliGRAM(s) Oral two times a day  multivitamin 1 Tablet(s) Oral daily  pantoprazole  Injectable 40 milliGRAM(s) IV Push daily  psyllium Powder 1 Packet(s) Oral daily  sodium chloride 3%  Inhalation 3 milliLiter(s) Inhalation every 6 hours    MEDICATIONS  (PRN):  acetaminophen    Suspension .. 650 milliGRAM(s) Enteral Tube every 6 hours PRN Temp greater or equal to 38C (100.4F), Mild Pain (1 - 3)      Social History: see consult note    Family history: see consult note    ROS:   ENT: all negative except as noted in HPI   Pulm: denies SOB, cough, hemoptysis  Neuro: denies numbness/tingling, loss of sensation  Endo: denies heat/cold intolerance, excessive sweating      Vital Signs Last 24 Hrs  T(C): 37 (19 Jan 2022 08:52), Max: 37.3 (19 Jan 2022 05:10)  T(F): 98.6 (19 Jan 2022 08:52), Max: 99.1 (19 Jan 2022 05:10)  HR: 104 (19 Jan 2022 08:52) (88 - 112)  BP: 124/72 (19 Jan 2022 08:52) (111/70 - 143/81)  BP(mean): --  RR: 18 (19 Jan 2022 08:52) (18 - 18)  SpO2: 96% (19 Jan 2022 08:52) (96% - 100%)                          6.9    8.56  )-----------( 515      ( 19 Jan 2022 09:28 )             22.0    01-18    137  |  96  |  19  ----------------------------<  104<H>  4.1   |  30  |  0.50    Ca    9.1      18 Jan 2022 07:07         PHYSICAL EXAM:  Gen: NAD  Skin: No rashes, bruises, or lesions  Head: Normocephalic, Atraumatic  Face: no edema, erythema, or fluctuance. Parotid glands soft without mass  Eyes: no scleral injection  Ears: Right: ear canal clear, TM intact without effusion or erythema. No evidence of any fluid drainage. No mastoid tenderness, erythema, or ear bulging            Left: ear canal clear, TM intact without effusion or erythema. No evidence of any fluid drainage. No mastoid tenderness, erythema, or ear bulging  Nose: Nares bilaterally patent, no discharge  Mouth: No Stridor / Drooling / Trismus.  Mucosa moist, tongue/uvula midline, oropharynx clear  Neck: Flat, supple, no lymphadenopathy, trachea midline, no masses  Lymphatic: No lymphadenopathy  Resp: breathing easily, no stridor  Neuro: facial nerve intact, no facial droop         ENT ISSUE/POD: trach bleed    HPI: 62yo male s/p tracheostomy on 11/19/21, s/p trach bleed yesterday. Pt has #7 portex uncuffed trach in place. Per team, nurse noted small amount of blood from trach tube and stoma yesterday. Blood was suctioned, no further bleeding noted since. Pt is planning for discharge to rehab, ENT called to evaluate trach prior to discharge. H/H 6.9/22.5, trending down from 8.0/25.6 yesterday. Pt is on Eliquis and 81mg ASA. Pt is nonverbal.         PAST MEDICAL & SURGICAL HISTORY:  HTN (hypertension)    Diabetes mellitus      Allergies    penicillin (Other)    Intolerances      MEDICATIONS  (STANDING):  acetylcysteine 20%  Inhalation 4 milliLiter(s) Inhalation every 6 hours  albuterol/ipratropium for Nebulization 3 milliLiter(s) Nebulizer every 6 hours  apixaban 5 milliGRAM(s) Enteral Tube every 12 hours  artificial  tears Solution 1 Drop(s) Both EYES every 6 hours  aspirin  chewable 81 milliGRAM(s) Oral daily  atorvastatin 40 milliGRAM(s) Oral at bedtime  chlorhexidine 0.12% Liquid 15 milliLiter(s) Oral Mucosa two times a day  cholestyramine Powder (Sugar-Free) 4 Gram(s) Oral three times a day  dextrose 50% Injectable 25 Gram(s) IV Push once  dextrose 50% Injectable 12.5 Gram(s) IV Push once  diphenoxylate/atropine 2 Tablet(s) Oral three times a day  doxazosin 2 milliGRAM(s) Oral daily  insulin lispro (ADMELOG) corrective regimen sliding scale   SubCutaneous every 6 hours  insulin NPH human recombinant 12 Unit(s) SubCutaneous every 6 hours  metoprolol tartrate 12.5 milliGRAM(s) Oral two times a day  multivitamin 1 Tablet(s) Oral daily  pantoprazole  Injectable 40 milliGRAM(s) IV Push daily  psyllium Powder 1 Packet(s) Oral daily  sodium chloride 3%  Inhalation 3 milliLiter(s) Inhalation every 6 hours    MEDICATIONS  (PRN):  acetaminophen    Suspension .. 650 milliGRAM(s) Enteral Tube every 6 hours PRN Temp greater or equal to 38C (100.4F), Mild Pain (1 - 3)      Social History: see consult note    Family history: see consult note    ROS:   unable to obtain due to pt's condition       Vital Signs Last 24 Hrs  T(C): 37 (19 Jan 2022 08:52), Max: 37.3 (19 Jan 2022 05:10)  T(F): 98.6 (19 Jan 2022 08:52), Max: 99.1 (19 Jan 2022 05:10)  HR: 104 (19 Jan 2022 08:52) (88 - 112)  BP: 124/72 (19 Jan 2022 08:52) (111/70 - 143/81)  BP(mean): --  RR: 18 (19 Jan 2022 08:52) (18 - 18)  SpO2: 96% (19 Jan 2022 08:52) (96% - 100%)                          6.9    8.56  )-----------( 515      ( 19 Jan 2022 09:28 )             22.0    01-18    137  |  96  |  19  ----------------------------<  104<H>  4.1   |  30  |  0.50    Ca    9.1      18 Jan 2022 07:07         PHYSICAL EXAM:  Gen: NAD  Skin: No rashes, bruises, or lesions  Head: Normocephalic, Atraumatic  Face: no edema, erythema, or fluctuance. Parotid glands soft without mass  Eyes: no scleral injection  Nose: Nares bilaterally patent, no discharge, no bleeding  Mouth: No Stridor / Drooling / Trismus. Minimal exam, pt noncompliant, no obvious bleeding noted.   Neck: #7 Portex uncuffed trach in place secured with velcro strap. No bleeding noted around stoma or from trach tube. Flexible suction passed through trach and suctioned clear secretions. Neck flat, supple, no lymphadenopathy, trachea midline, no masses  Lymphatic: No lymphadenopathy  Resp: on trach collar, breathing easily, no stridor  Neuro: unable to assess

## 2022-01-19 NOTE — PROVIDER CONTACT NOTE (CRITICAL VALUE NOTIFICATION) - RECOMMENDATIONS
redraw blood/ transfuse
Transfuse PRBC.  Re-check CBC post.
as per team
Repeat CBC? Give 1 unit of PRBCs?
repeat labs?

## 2022-01-19 NOTE — PROGRESS NOTE ADULT - PROBLEM SELECTOR PLAN 2
Already completed course of PO vanco previously.  GI has a low suspicion for active C. Diff infection  - ID recommending continue PO vancomycin slow taper, 125 tid x 1 week, 125 BID x 1 week, 125 daily x 1 week, and 125 every other day x 2 weeks  - c/w Banatrol QID for loose stool, Cholestyramine and lomotil   - increase lomotil to tid  - rectal tube removed.  - judicious use of abx therapy as may exacerbate c. diff  - plan d/c GI Dr. Noel Already completed course of PO vanco previously.  GI has a low suspicion for active C. Diff infection  - ID recommending continue PO vancomycin slow taper, 125 tid x 1 week, 125 BID x 1 week, 125 daily x 1 week, and 125 every other day x 2 weeks  - c/w Banatrol QID for loose stool, Cholestyramine and lomotil   - c/w lomotil to tid  - rectal tube removed.  - judicious use of abx therapy as may exacerbate c. diff

## 2022-01-19 NOTE — PROVIDER CONTACT NOTE (CRITICAL VALUE NOTIFICATION) - BACKGROUND
s/p suboccipital crani
s/p suboccipital crani 11/4
ICH, hydrocephalus
stroke s/p trache, peg tube, now c diff +
Pt admitted got nontraumatic subdural.

## 2022-01-19 NOTE — PROGRESS NOTE ADULT - PROBLEM SELECTOR PLAN 3
overall stable in 7-8 range, with intermittent need for transfusion.  - transfused 1u PRBC on 12/22 for Hb 6.8 with appropriate response  - monitor Hb on CBC daily  - transfuse for Hb<7 Hgb trend: 6.9 <-- , 6.9 <-- , 8.0 <-- , 7.8 <-- , 7.2 <--   Hg drop today  no obvious source of bleeding.  will transfuse 1 uprbcs

## 2022-01-19 NOTE — PROGRESS NOTE ADULT - SUBJECTIVE AND OBJECTIVE BOX
Andre Reyes, M.D.  Pager: [phone]  Office: [phone]    Patient is a 61y old  Male who presents with a chief complaint of Posterior fossa hemorrhage (18 Jan 2022 10:47)          SUBJECTIVE / OVERNIGHT EVENTS:    No acute overnight events.    ROS: ( - ) Fever, ( - )Chills,  ( - )Nausea/Vomiting, ( - ) Cough, ( - )Shortness of breath, ( - )Chest Pain    MEDICATIONS  (STANDING):  acetylcysteine 20%  Inhalation 4 milliLiter(s) Inhalation every 6 hours  albuterol/ipratropium for Nebulization 3 milliLiter(s) Nebulizer every 6 hours  apixaban 5 milliGRAM(s) Enteral Tube every 12 hours  artificial  tears Solution 1 Drop(s) Both EYES every 6 hours  aspirin  chewable 81 milliGRAM(s) Oral daily  atorvastatin 40 milliGRAM(s) Oral at bedtime  chlorhexidine 0.12% Liquid 15 milliLiter(s) Oral Mucosa two times a day  cholestyramine Powder (Sugar-Free) 4 Gram(s) Oral three times a day  dextrose 50% Injectable 25 Gram(s) IV Push once  dextrose 50% Injectable 12.5 Gram(s) IV Push once  diphenoxylate/atropine 2 Tablet(s) Oral three times a day  doxazosin 2 milliGRAM(s) Oral daily  insulin lispro (ADMELOG) corrective regimen sliding scale   SubCutaneous every 6 hours  insulin NPH human recombinant 12 Unit(s) SubCutaneous every 6 hours  metoprolol tartrate 12.5 milliGRAM(s) Oral two times a day  multivitamin 1 Tablet(s) Oral daily  pantoprazole  Injectable 40 milliGRAM(s) IV Push daily  psyllium Powder 1 Packet(s) Oral daily  sodium chloride 3%  Inhalation 3 milliLiter(s) Inhalation every 6 hours    MEDICATIONS  (PRN):  acetaminophen    Suspension .. 650 milliGRAM(s) Enteral Tube every 6 hours PRN Temp greater or equal to 38C (100.4F), Mild Pain (1 - 3)          T(C): 37 (01-19 @ 08:52), Max: 37.3 (01-19 @ 05:10)   HR: 104   BP: 124/72   RR: 18   SpO2: 96%    PHYSICAL EXAM:          LABS:                        6.9    8.56  )-----------( 515      ( 19 Jan 2022 09:28 )             22.0      01-18    137  |  96  |  19  ----------------------------<  104<H>  4.1   |  30  |  0.50    Ca    9.1      18 Jan 2022 07:07         CAPILLARY BLOOD GLUCOSE      POCT Blood Glucose.: 120 mg/dL (19 Jan 2022 11:24)  POCT Blood Glucose.: 127 mg/dL (19 Jan 2022 04:51)  POCT Blood Glucose.: 136 mg/dL (19 Jan 2022 00:27)  POCT Blood Glucose.: 153 mg/dL (18 Jan 2022 17:35)  POCT Blood Glucose.: 125 mg/dL (18 Jan 2022 13:26)      RADIOLOGY & ADDITIONAL TESTS:    Imaging Personally Reviewed:  Consultant(s) Notes Reviewed:    Care Discussed with Consultants/Other Providers:   Andre Reyes, M.D.  Pager: [phone]  Office: [phone]    Patient is a 61y old  Male who presents with a chief complaint of Posterior fossa hemorrhage (18 Jan 2022 10:47)          SUBJECTIVE / OVERNIGHT EVENTS:    No acute overnight events.  Bowel movements have improved.  Drop in hgb but not clear source of bleeding    MEDICATIONS  (STANDING):  acetylcysteine 20%  Inhalation 4 milliLiter(s) Inhalation every 6 hours  albuterol/ipratropium for Nebulization 3 milliLiter(s) Nebulizer every 6 hours  apixaban 5 milliGRAM(s) Enteral Tube every 12 hours  artificial  tears Solution 1 Drop(s) Both EYES every 6 hours  aspirin  chewable 81 milliGRAM(s) Oral daily  atorvastatin 40 milliGRAM(s) Oral at bedtime  chlorhexidine 0.12% Liquid 15 milliLiter(s) Oral Mucosa two times a day  cholestyramine Powder (Sugar-Free) 4 Gram(s) Oral three times a day  dextrose 50% Injectable 25 Gram(s) IV Push once  dextrose 50% Injectable 12.5 Gram(s) IV Push once  diphenoxylate/atropine 2 Tablet(s) Oral three times a day  doxazosin 2 milliGRAM(s) Oral daily  insulin lispro (ADMELOG) corrective regimen sliding scale   SubCutaneous every 6 hours  insulin NPH human recombinant 12 Unit(s) SubCutaneous every 6 hours  metoprolol tartrate 12.5 milliGRAM(s) Oral two times a day  multivitamin 1 Tablet(s) Oral daily  pantoprazole  Injectable 40 milliGRAM(s) IV Push daily  psyllium Powder 1 Packet(s) Oral daily  sodium chloride 3%  Inhalation 3 milliLiter(s) Inhalation every 6 hours    MEDICATIONS  (PRN):  acetaminophen    Suspension .. 650 milliGRAM(s) Enteral Tube every 6 hours PRN Temp greater or equal to 38C (100.4F), Mild Pain (1 - 3)          T(C): 37 (01-19 @ 08:52), Max: 37.3 (01-19 @ 05:10)   HR: 104   BP: 124/72   RR: 18   SpO2: 96%    PHYSICAL EXAM:    CONSTITUTIONAL: Chronically ill appearing male laying in bed in NAD  EYES: Opening eyes, clear sclera  NECK: +Trach  RESPIRATORY: Normal respiratory effort; rhonchi bilaterally in upper lung fields  CARDIOVASCULAR: Regular rate and rhythm, normal S1 and S2, no murmur/rub/gallop; No lower extremity edema  ABDOMEN: Nontender to palpation, normoactive bowel sounds, no rebound/guarding, +PEG, + rectal tube  MUSCULOSKELETAL: No clubbing or cyanosis of digits; no joint swelling or tenderness to palpation  PSYCH: Awake, alert, unable to assess orientation as patient nonverbal  NEUROLOGY: Able to make fist with both hands, infrequently able to follow simple commands  SKIN: + sacral ulcer        LABS:                        6.9    8.56  )-----------( 515      ( 19 Jan 2022 09:28 )             22.0      01-18    137  |  96  |  19  ----------------------------<  104<H>  4.1   |  30  |  0.50    Ca    9.1      18 Jan 2022 07:07         CAPILLARY BLOOD GLUCOSE      POCT Blood Glucose.: 120 mg/dL (19 Jan 2022 11:24)  POCT Blood Glucose.: 127 mg/dL (19 Jan 2022 04:51)  POCT Blood Glucose.: 136 mg/dL (19 Jan 2022 00:27)  POCT Blood Glucose.: 153 mg/dL (18 Jan 2022 17:35)  POCT Blood Glucose.: 125 mg/dL (18 Jan 2022 13:26)      RADIOLOGY & ADDITIONAL TESTS:    Imaging Personally Reviewed:  Consultant(s) Notes Reviewed:    Care Discussed with Consultants/Other Providers:

## 2022-01-19 NOTE — PROGRESS NOTE ADULT - ATTENDING SUPERVISION STATEMENT
Fellow
Resident
Fellow
ACP
ACP
Fellow
ACP
Fellow
Resident

## 2022-01-19 NOTE — PROVIDER CONTACT NOTE (CRITICAL VALUE NOTIFICATION) - ASSESSMENT
Pt admitted got nontraumatic subdural.  Pt hemoglobin 6.8.
a &ox0, nonverbal, trache in place. no effort x4. hemoglobin 6.9
Patient neurologically unchanged. Vital signs stable. AM CBC showed low hemoglobin
Hemoglobin 6.4 and hematocrit 20.4

## 2022-01-19 NOTE — PROVIDER CONTACT NOTE (CRITICAL VALUE NOTIFICATION) - ACTION/TREATMENT ORDERED:
Repeat CBC and give 1 unit of PRBCs if hemoglobin is still low.
Notified primary RN Steph and made Wendy GODWIN aware of results. Primary RN to start blood transfusion as ordered.
will look into chart and reassess/ no new orders.
Transfuse PRBC.  Re-check CBC post.  1 L NS bolus given.
continue to monitor patient. Pending further interventions;

## 2022-01-19 NOTE — PROGRESS NOTE ADULT - PROBLEM SELECTOR PLAN 1
Resolved  - patient with copious secretions, episode of hypoxia improved after frequent suctioning  - completed IV Cefepime and flagyl for possible aspiration pneumonia 5/5 days on 1/8  - follow up blood cultures NGTD  - aspiration precaution  - on Vanco taper via PEG for c diff

## 2022-01-19 NOTE — PROGRESS NOTE ADULT - ATTENDING COMMENTS
some oozing from around trach yesterday. area inspected, no lesions seen  should follow up if bleeding starts again

## 2022-01-19 NOTE — PROGRESS NOTE ADULT - SUBJECTIVE AND OBJECTIVE BOX
Patient is a 61y old  Male who presents with a chief complaint of Posterior fossa hemorrhage (19 Jan 2022 12:04)      HPI:  61M hx HTN, DM, cardiac stent on ASA. At work complaining of HA ~8:30, starting feeling dizzy and vomiting, HTN/keke when EMS got to him. SBP 220s, HR 50s.     On EMS arrival at 09:39AM 11/4/21, patient seen talking a little, garbling, moving all extremities, then quickly became unresponsive. No known blood thinners.    CTH shows large posterior fossa bleed w/ IVH/SAH/hydro.    Exam:  Pre/intubation exam: no eyes open, pupils 2b/l, + corneals/cough/gag, not FC, LUE spont fingers moving, flacid otherwise    ICU Vital Signs Last 24 Hrs  T(C): 34.8 (04 Nov 2021 12:00), Max: 34.8 (04 Nov 2021 12:00)  T(F): 94.6 (04 Nov 2021 12:00), Max: 94.6 (04 Nov 2021 12:00)  HR: 127 (04 Nov 2021 11:30) (109 - 146)  BP: 123/78 (04 Nov 2021 11:30) (123/78 - 245/142)  BP(mean): --  ABP: --  ABP(mean): --  RR: 19 (04 Nov 2021 11:30) (19 - 30)  SpO2: 100% (04 Nov 2021 11:30) (100% - 100%)  11-04    140  |  103  |  18  ----------------------------<  226<H>  3.3<L>   |  19<L>  |  1.24    Ca    9.5      04 Nov 2021 10:16    TPro  7.8  /  Alb  4.8  /  TBili  0.4  /  DBili  x   /  AST  18  /  ALT  14  /  AlkPhos  94  11-04                        12.9   15.29 )-----------( 322      ( 04 Nov 2021 10:16 )             40.5    (04 Nov 2021 12:28)      PAST MEDICAL & SURGICAL HISTORY:  HTN (hypertension)    Diabetes mellitus        MEDICATIONS  (STANDING):  acetylcysteine 20%  Inhalation 4 milliLiter(s) Inhalation every 6 hours  albuterol/ipratropium for Nebulization 3 milliLiter(s) Nebulizer every 6 hours  apixaban 5 milliGRAM(s) Enteral Tube every 12 hours  artificial  tears Solution 1 Drop(s) Both EYES every 6 hours  aspirin  chewable 81 milliGRAM(s) Oral daily  atorvastatin 40 milliGRAM(s) Oral at bedtime  chlorhexidine 0.12% Liquid 15 milliLiter(s) Oral Mucosa two times a day  cholestyramine Powder (Sugar-Free) 4 Gram(s) Oral three times a day  dextrose 50% Injectable 25 Gram(s) IV Push once  dextrose 50% Injectable 12.5 Gram(s) IV Push once  diphenoxylate/atropine 2 Tablet(s) Oral three times a day  doxazosin 2 milliGRAM(s) Oral daily  insulin lispro (ADMELOG) corrective regimen sliding scale   SubCutaneous every 6 hours  insulin NPH human recombinant 12 Unit(s) SubCutaneous every 6 hours  metoprolol tartrate 12.5 milliGRAM(s) Oral two times a day  multivitamin 1 Tablet(s) Oral daily  pantoprazole  Injectable 40 milliGRAM(s) IV Push daily  psyllium Powder 1 Packet(s) Oral daily  sodium chloride 3%  Inhalation 3 milliLiter(s) Inhalation every 6 hours    MEDICATIONS  (PRN):  acetaminophen    Suspension .. 650 milliGRAM(s) Enteral Tube every 6 hours PRN Temp greater or equal to 38C (100.4F), Mild Pain (1 - 3)      Allergies    penicillin (Other)    Intolerances        VITALS:    Vital Signs Last 24 Hrs  T(C): 37 (19 Jan 2022 15:10), Max: 37.3 (19 Jan 2022 05:10)  T(F): 98.6 (19 Jan 2022 15:10), Max: 99.1 (19 Jan 2022 05:10)  HR: 94 (19 Jan 2022 15:10) (94 - 112)  BP: 127/77 (19 Jan 2022 15:10) (111/70 - 127/77)  BP(mean): --  RR: 18 (19 Jan 2022 15:10) (18 - 18)  SpO2: 100% (19 Jan 2022 15:10) (96% - 100%)    LABS:                          6.9    8.56  )-----------( 515      ( 19 Jan 2022 09:28 )             22.0       01-18    137  |  96  |  19  ----------------------------<  104<H>  4.1   |  30  |  0.50    Ca    9.1      18 Jan 2022 07:07        CAPILLARY BLOOD GLUCOSE      POCT Blood Glucose.: 120 mg/dL (19 Jan 2022 11:24)  POCT Blood Glucose.: 127 mg/dL (19 Jan 2022 04:51)  POCT Blood Glucose.: 136 mg/dL (19 Jan 2022 00:27)  POCT Blood Glucose.: 153 mg/dL (18 Jan 2022 17:35)          LOWER EXTREMITY PHYSICAL EXAM:    Vasular: DP/PT 2/4, B/L, CFT <3 seconds B/L, Temperature gradient wnl, B/L.   Neuro: Unable to assess Epicritic sensation to the level of leg B/L.   Musculoskeletal/Ortho:  Skin:No signs of wounds or ulcerations both feet  Thick, dystrophic discolored nails 1,2,3,4,5 both feet  No signs of acute infection no signs of cellulitis both legs

## 2022-01-19 NOTE — PROGRESS NOTE ADULT - ASSESSMENT
60yo male seen for trach evaluation s/p trach bleed yesterday. Per nurse, no further bleeding since small episode yesterday. On exam today, no bleeding from trach tube or stoma, clear secretions suctioned from trachea.

## 2022-01-19 NOTE — PROGRESS NOTE ADULT - ASSESSMENT
Patient is a 61y old  Male who presents with a chief complaint of Posterior fossa hemorrhage  Patient sleeping during visit and non-communicative      Vascular: DP/PT 2/4, B/L, CFT <3 seconds B/L, Temperature gradient wnl, B/L.   Neuro: Unable to assess Epicritic sensation to the level of leg B/L.   Musculoskeletal/Ortho:  Skin: No signs of wounds or ulcerations both feet  Thick, dystrophic discolored nails 1,2,3,4,5 both feet  No signs of acute infection no signs of cellulitis both legs  Debride all nails 1,2,3,4,5 both feet with sterile nail clipper without incident  Reconsult Podiatry as needed

## 2022-01-19 NOTE — PROVIDER CONTACT NOTE (CRITICAL VALUE NOTIFICATION) - SITUATION
Hemoglobin 6.9
ICH
Hemoglobin 6.4 and hematocrit 20.4
Pt admitted got nontraumatic subdural.  Pt hemoglobin 6.8.
admitted for stroke

## 2022-01-19 NOTE — CHART NOTE - NSCHARTNOTEFT_GEN_A_CORE
Medicine NP note    CC: Critical CBC                          6.9    7.78  )-----------( 509      ( 19 Jan 2022 06:02 )             22.5            Acute anemia  No trach bleeding  Repeat CBC  If repeat CBC < 7.0/ or pt clinically symptomatic transfuse 1 unit PRBC  On AC, stool occult blood ordered  Consider holding AC if repaet CBC trending down  Signed out to day provider

## 2022-01-20 LAB
ALBUMIN SERPL ELPH-MCNC: 2.4 G/DL — LOW (ref 3.3–5)
ALP SERPL-CCNC: 93 U/L — SIGNIFICANT CHANGE UP (ref 40–120)
ALT FLD-CCNC: 25 U/L — SIGNIFICANT CHANGE UP (ref 10–45)
ANION GAP SERPL CALC-SCNC: 9 MMOL/L — SIGNIFICANT CHANGE UP (ref 5–17)
APTT BLD: 40 SEC — HIGH (ref 27.5–35.5)
AST SERPL-CCNC: 19 U/L — SIGNIFICANT CHANGE UP (ref 10–40)
BASOPHILS # BLD AUTO: 0.04 K/UL — SIGNIFICANT CHANGE UP (ref 0–0.2)
BASOPHILS NFR BLD AUTO: 0.5 % — SIGNIFICANT CHANGE UP (ref 0–2)
BILIRUB SERPL-MCNC: 0.3 MG/DL — SIGNIFICANT CHANGE UP (ref 0.2–1.2)
BUN SERPL-MCNC: 14 MG/DL — SIGNIFICANT CHANGE UP (ref 7–23)
CALCIUM SERPL-MCNC: 9.1 MG/DL — SIGNIFICANT CHANGE UP (ref 8.4–10.5)
CHLORIDE SERPL-SCNC: 99 MMOL/L — SIGNIFICANT CHANGE UP (ref 96–108)
CO2 SERPL-SCNC: 29 MMOL/L — SIGNIFICANT CHANGE UP (ref 22–31)
CREAT SERPL-MCNC: 0.55 MG/DL — SIGNIFICANT CHANGE UP (ref 0.5–1.3)
EOSINOPHIL # BLD AUTO: 0.37 K/UL — SIGNIFICANT CHANGE UP (ref 0–0.5)
EOSINOPHIL NFR BLD AUTO: 4.5 % — SIGNIFICANT CHANGE UP (ref 0–6)
GLUCOSE SERPL-MCNC: 81 MG/DL — SIGNIFICANT CHANGE UP (ref 70–99)
HCT VFR BLD CALC: 26.2 % — LOW (ref 39–50)
HGB BLD-MCNC: 8.2 G/DL — LOW (ref 13–17)
IMM GRANULOCYTES NFR BLD AUTO: 0.9 % — SIGNIFICANT CHANGE UP (ref 0–1.5)
INR BLD: 1.33 RATIO — HIGH (ref 0.88–1.16)
LYMPHOCYTES # BLD AUTO: 1.84 K/UL — SIGNIFICANT CHANGE UP (ref 1–3.3)
LYMPHOCYTES # BLD AUTO: 22.5 % — SIGNIFICANT CHANGE UP (ref 13–44)
MAGNESIUM SERPL-MCNC: 2 MG/DL — SIGNIFICANT CHANGE UP (ref 1.6–2.6)
MCHC RBC-ENTMCNC: 28.2 PG — SIGNIFICANT CHANGE UP (ref 27–34)
MCHC RBC-ENTMCNC: 31.3 GM/DL — LOW (ref 32–36)
MCV RBC AUTO: 90 FL — SIGNIFICANT CHANGE UP (ref 80–100)
MONOCYTES # BLD AUTO: 0.58 K/UL — SIGNIFICANT CHANGE UP (ref 0–0.9)
MONOCYTES NFR BLD AUTO: 7.1 % — SIGNIFICANT CHANGE UP (ref 2–14)
NEUTROPHILS # BLD AUTO: 5.27 K/UL — SIGNIFICANT CHANGE UP (ref 1.8–7.4)
NEUTROPHILS NFR BLD AUTO: 64.5 % — SIGNIFICANT CHANGE UP (ref 43–77)
NRBC # BLD: 0 /100 WBCS — SIGNIFICANT CHANGE UP (ref 0–0)
OB PNL STL: NEGATIVE — SIGNIFICANT CHANGE UP
PLATELET # BLD AUTO: 500 K/UL — HIGH (ref 150–400)
POTASSIUM SERPL-MCNC: 4.2 MMOL/L — SIGNIFICANT CHANGE UP (ref 3.5–5.3)
POTASSIUM SERPL-SCNC: 4.2 MMOL/L — SIGNIFICANT CHANGE UP (ref 3.5–5.3)
PROT SERPL-MCNC: 6 G/DL — SIGNIFICANT CHANGE UP (ref 6–8.3)
PROTHROM AB SERPL-ACNC: 15.8 SEC — HIGH (ref 10.6–13.6)
RBC # BLD: 2.91 M/UL — LOW (ref 4.2–5.8)
RBC # FLD: 14.4 % — SIGNIFICANT CHANGE UP (ref 10.3–14.5)
SODIUM SERPL-SCNC: 137 MMOL/L — SIGNIFICANT CHANGE UP (ref 135–145)
WBC # BLD: 8.17 K/UL — SIGNIFICANT CHANGE UP (ref 3.8–10.5)
WBC # FLD AUTO: 8.17 K/UL — SIGNIFICANT CHANGE UP (ref 3.8–10.5)

## 2022-01-20 PROCEDURE — 99222 1ST HOSP IP/OBS MODERATE 55: CPT

## 2022-01-20 PROCEDURE — 99233 SBSQ HOSP IP/OBS HIGH 50: CPT

## 2022-01-20 RX ORDER — CHOLESTYRAMINE 4 G/9G
4 POWDER, FOR SUSPENSION ORAL
Refills: 0 | Status: COMPLETED | OUTPATIENT
Start: 2022-01-20 | End: 2022-01-25

## 2022-01-20 RX ADMIN — Medication 2 TABLET(S): at 00:25

## 2022-01-20 RX ADMIN — APIXABAN 5 MILLIGRAM(S): 2.5 TABLET, FILM COATED ORAL at 17:30

## 2022-01-20 RX ADMIN — HUMAN INSULIN 12 UNIT(S): 100 INJECTION, SUSPENSION SUBCUTANEOUS at 23:43

## 2022-01-20 RX ADMIN — Medication 3 MILLILITER(S): at 17:31

## 2022-01-20 RX ADMIN — Medication 4 MILLILITER(S): at 05:42

## 2022-01-20 RX ADMIN — Medication 4 MILLILITER(S): at 12:15

## 2022-01-20 RX ADMIN — Medication 2 TABLET(S): at 05:08

## 2022-01-20 RX ADMIN — Medication 1 DROP(S): at 17:30

## 2022-01-20 RX ADMIN — Medication 81 MILLIGRAM(S): at 12:14

## 2022-01-20 RX ADMIN — Medication 2 TABLET(S): at 21:12

## 2022-01-20 RX ADMIN — CHOLESTYRAMINE 4 GRAM(S): 4 POWDER, FOR SUSPENSION ORAL at 05:09

## 2022-01-20 RX ADMIN — CHLORHEXIDINE GLUCONATE 15 MILLILITER(S): 213 SOLUTION TOPICAL at 17:32

## 2022-01-20 RX ADMIN — Medication 650 MILLIGRAM(S): at 05:09

## 2022-01-20 RX ADMIN — ATORVASTATIN CALCIUM 40 MILLIGRAM(S): 80 TABLET, FILM COATED ORAL at 21:13

## 2022-01-20 RX ADMIN — CHOLESTYRAMINE 4 GRAM(S): 4 POWDER, FOR SUSPENSION ORAL at 21:12

## 2022-01-20 RX ADMIN — CHLORHEXIDINE GLUCONATE 15 MILLILITER(S): 213 SOLUTION TOPICAL at 05:42

## 2022-01-20 RX ADMIN — HUMAN INSULIN 12 UNIT(S): 100 INJECTION, SUSPENSION SUBCUTANEOUS at 17:32

## 2022-01-20 RX ADMIN — Medication 2 MILLIGRAM(S): at 05:10

## 2022-01-20 RX ADMIN — Medication 1 PACKET(S): at 13:18

## 2022-01-20 RX ADMIN — Medication 3 MILLILITER(S): at 12:15

## 2022-01-20 RX ADMIN — SODIUM CHLORIDE 3 MILLILITER(S): 9 INJECTION INTRAMUSCULAR; INTRAVENOUS; SUBCUTANEOUS at 23:43

## 2022-01-20 RX ADMIN — HUMAN INSULIN 12 UNIT(S): 100 INJECTION, SUSPENSION SUBCUTANEOUS at 11:53

## 2022-01-20 RX ADMIN — SODIUM CHLORIDE 3 MILLILITER(S): 9 INJECTION INTRAMUSCULAR; INTRAVENOUS; SUBCUTANEOUS at 17:31

## 2022-01-20 RX ADMIN — Medication 2 TABLET(S): at 15:20

## 2022-01-20 RX ADMIN — Medication 3 MILLILITER(S): at 23:43

## 2022-01-20 RX ADMIN — Medication 125 MILLIGRAM(S): at 13:18

## 2022-01-20 RX ADMIN — Medication 1 DROP(S): at 23:43

## 2022-01-20 RX ADMIN — Medication 12.5 MILLIGRAM(S): at 17:30

## 2022-01-20 RX ADMIN — Medication 4 MILLILITER(S): at 23:43

## 2022-01-20 RX ADMIN — SODIUM CHLORIDE 3 MILLILITER(S): 9 INJECTION INTRAMUSCULAR; INTRAVENOUS; SUBCUTANEOUS at 05:09

## 2022-01-20 RX ADMIN — Medication 2: at 17:32

## 2022-01-20 RX ADMIN — SODIUM CHLORIDE 3 MILLILITER(S): 9 INJECTION INTRAMUSCULAR; INTRAVENOUS; SUBCUTANEOUS at 12:13

## 2022-01-20 RX ADMIN — Medication 4 MILLILITER(S): at 17:31

## 2022-01-20 RX ADMIN — HUMAN INSULIN 12 UNIT(S): 100 INJECTION, SUSPENSION SUBCUTANEOUS at 05:07

## 2022-01-20 RX ADMIN — Medication 1 DROP(S): at 05:08

## 2022-01-20 RX ADMIN — Medication 1 DROP(S): at 12:13

## 2022-01-20 RX ADMIN — Medication 12.5 MILLIGRAM(S): at 05:09

## 2022-01-20 RX ADMIN — PANTOPRAZOLE SODIUM 40 MILLIGRAM(S): 20 TABLET, DELAYED RELEASE ORAL at 13:18

## 2022-01-20 RX ADMIN — Medication 1 TABLET(S): at 13:18

## 2022-01-20 RX ADMIN — Medication 2: at 11:53

## 2022-01-20 RX ADMIN — Medication 3 MILLILITER(S): at 05:08

## 2022-01-20 RX ADMIN — APIXABAN 5 MILLIGRAM(S): 2.5 TABLET, FILM COATED ORAL at 05:09

## 2022-01-20 RX ADMIN — Medication 4 MILLILITER(S): at 02:58

## 2022-01-20 NOTE — PROGRESS NOTE ADULT - PROBLEM SELECTOR PROBLEM 8
How Severe Are Your Bumps?: moderate Have Your Bumps Been Treated?: not been treated Is This A New Presentation, Or A Follow-Up?: Bumps CAD (coronary artery disease)

## 2022-01-20 NOTE — PROGRESS NOTE ADULT - SUBJECTIVE AND OBJECTIVE BOX
Andre Reyes, M.D.  Pager: [phone]  Office: [phone]    Patient is a 61y old  Male who presents with a chief complaint of Posterior fossa hemorrhage (19 Jan 2022 12:04)          SUBJECTIVE / OVERNIGHT EVENTS:    No acute overnight events.    ROS: ( - ) Fever, ( - )Chills,  ( - )Nausea/Vomiting, ( - ) Cough, ( - )Shortness of breath, ( - )Chest Pain    MEDICATIONS  (STANDING):  acetylcysteine 20%  Inhalation 4 milliLiter(s) Inhalation every 6 hours  albuterol/ipratropium for Nebulization 3 milliLiter(s) Nebulizer every 6 hours  apixaban 5 milliGRAM(s) Enteral Tube every 12 hours  artificial  tears Solution 1 Drop(s) Both EYES every 6 hours  aspirin  chewable 81 milliGRAM(s) Oral daily  atorvastatin 40 milliGRAM(s) Oral at bedtime  chlorhexidine 0.12% Liquid 15 milliLiter(s) Oral Mucosa two times a day  cholestyramine Powder (Sugar-Free) 4 Gram(s) Oral two times a day  dextrose 50% Injectable 25 Gram(s) IV Push once  dextrose 50% Injectable 12.5 Gram(s) IV Push once  diphenoxylate/atropine 2 Tablet(s) Oral three times a day  doxazosin 2 milliGRAM(s) Oral daily  insulin lispro (ADMELOG) corrective regimen sliding scale   SubCutaneous every 6 hours  insulin NPH human recombinant 12 Unit(s) SubCutaneous every 6 hours  metoprolol tartrate 12.5 milliGRAM(s) Oral two times a day  multivitamin 1 Tablet(s) Oral daily  pantoprazole  Injectable 40 milliGRAM(s) IV Push daily  psyllium Powder 1 Packet(s) Oral daily  sodium chloride 3%  Inhalation 3 milliLiter(s) Inhalation every 6 hours  vancomycin    Solution 125 milliGRAM(s) Oral daily    MEDICATIONS  (PRN):  acetaminophen    Suspension .. 650 milliGRAM(s) Enteral Tube every 6 hours PRN Temp greater or equal to 38C (100.4F), Mild Pain (1 - 3)          T(C): 37.3 (01-20 @ 08:15), Max: 37.3 (01-20 @ 08:15)   HR: 94   BP: 151/99   RR: 18   SpO2: 98%    PHYSICAL EXAM:          LABS:                        8.2    8.17  )-----------( 500      ( 20 Jan 2022 08:18 )             26.2      01-20    137  |  99  |  14  ----------------------------<  81  4.2   |  29  |  0.55    Ca    9.1      20 Jan 2022 07:14  Mg     2.0     01-20    TPro  6.0  /  Alb  2.4<L>  /  TBili  0.3  /  DBili  x   /  AST  19  /  ALT  25  /  AlkPhos  93  01-20       CAPILLARY BLOOD GLUCOSE      POCT Blood Glucose.: 91 mg/dL (20 Jan 2022 05:05)  POCT Blood Glucose.: 124 mg/dL (19 Jan 2022 23:24)  POCT Blood Glucose.: 140 mg/dL (19 Jan 2022 17:35)      RADIOLOGY & ADDITIONAL TESTS:    Imaging Personally Reviewed:  Consultant(s) Notes Reviewed:    Care Discussed with Consultants/Other Providers:   Andre Reyes, M.D.  Pager: [phone]  Office: [phone]    Patient is a 61y old  Male who presents with a chief complaint of Posterior fossa hemorrhage (19 Jan 2022 12:04)          SUBJECTIVE / OVERNIGHT EVENTS:    No acute overnight events.  No new complaints from nursing staff    MEDICATIONS  (STANDING):  acetylcysteine 20%  Inhalation 4 milliLiter(s) Inhalation every 6 hours  albuterol/ipratropium for Nebulization 3 milliLiter(s) Nebulizer every 6 hours  apixaban 5 milliGRAM(s) Enteral Tube every 12 hours  artificial  tears Solution 1 Drop(s) Both EYES every 6 hours  aspirin  chewable 81 milliGRAM(s) Oral daily  atorvastatin 40 milliGRAM(s) Oral at bedtime  chlorhexidine 0.12% Liquid 15 milliLiter(s) Oral Mucosa two times a day  cholestyramine Powder (Sugar-Free) 4 Gram(s) Oral two times a day  dextrose 50% Injectable 25 Gram(s) IV Push once  dextrose 50% Injectable 12.5 Gram(s) IV Push once  diphenoxylate/atropine 2 Tablet(s) Oral three times a day  doxazosin 2 milliGRAM(s) Oral daily  insulin lispro (ADMELOG) corrective regimen sliding scale   SubCutaneous every 6 hours  insulin NPH human recombinant 12 Unit(s) SubCutaneous every 6 hours  metoprolol tartrate 12.5 milliGRAM(s) Oral two times a day  multivitamin 1 Tablet(s) Oral daily  pantoprazole  Injectable 40 milliGRAM(s) IV Push daily  psyllium Powder 1 Packet(s) Oral daily  sodium chloride 3%  Inhalation 3 milliLiter(s) Inhalation every 6 hours  vancomycin    Solution 125 milliGRAM(s) Oral daily    MEDICATIONS  (PRN):  acetaminophen    Suspension .. 650 milliGRAM(s) Enteral Tube every 6 hours PRN Temp greater or equal to 38C (100.4F), Mild Pain (1 - 3)          T(C): 37.3 (01-20 @ 08:15), Max: 37.3 (01-20 @ 08:15)   HR: 94   BP: 151/99   RR: 18   SpO2: 98%    PHYSICAL EXAM:    CONSTITUTIONAL: Chronically ill appearing male laying in bed in NAD  EYES: Opening eyes, clear sclera  NECK: +Trach  RESPIRATORY: Normal respiratory effort; rhonchi bilaterally in upper lung fields  CARDIOVASCULAR: Regular rate and rhythm, normal S1 and S2, no murmur/rub/gallop; No lower extremity edema  ABDOMEN: Nontender to palpation, normoactive bowel sounds, no rebound/guarding, +PEG, + rectal tube  MUSCULOSKELETAL: No clubbing or cyanosis of digits; no joint swelling or tenderness to palpation  PSYCH: Awake, alert, unable to assess orientation as patient nonverbal  NEUROLOGY: Able to make fist with both hands, infrequently able to follow simple commands  SKIN: + sacral ulcer        LABS:                        8.2    8.17  )-----------( 500      ( 20 Jan 2022 08:18 )             26.2      01-20    137  |  99  |  14  ----------------------------<  81  4.2   |  29  |  0.55    Ca    9.1      20 Jan 2022 07:14  Mg     2.0     01-20    TPro  6.0  /  Alb  2.4<L>  /  TBili  0.3  /  DBili  x   /  AST  19  /  ALT  25  /  AlkPhos  93  01-20       CAPILLARY BLOOD GLUCOSE      POCT Blood Glucose.: 91 mg/dL (20 Jan 2022 05:05)  POCT Blood Glucose.: 124 mg/dL (19 Jan 2022 23:24)  POCT Blood Glucose.: 140 mg/dL (19 Jan 2022 17:35)      RADIOLOGY & ADDITIONAL TESTS:    Imaging Personally Reviewed:  Consultant(s) Notes Reviewed:    Care Discussed with Consultants/Other Providers:

## 2022-01-20 NOTE — PROGRESS NOTE ADULT - ASSESSMENT
61 year old man with respiratory failure d/t ICH    1. ICH  -per neurosurgery  -s/p  shunt    2. Respiratory failure  - for tracheostomy, will require long term enteral nutrition  - s/p PEG, continue TF and use of abdominal binder  - banatrol added for loose stool    3. Enterobacter PNA  -s/p course of antibiotics     4. Anemia, no overt GI bleed. EGD unremarkable. Anemia of chronic disease  -hgb stable  -monitor for GI bleeding     5. Cdiff infection   -rectal tube removed, had small loose brown BM last night and this morning   -on vanco taper  -doubt active cdiff at this time  -lomotil added     6. DVT on a/c  -apixaban 5mg resumed  -continue PPI     7. Pneumothorax  -per CTS    Attending supervision statement: I have personally seen and examined the patient. I fully participated in the care of this patient. I have made amendments to the documentation where necessary, and agree with the history, physical exam, and plan as outlined by the ACP.      [facility]  Gastroenterology and Hepatology  [address] [place], [place]  Office: [phone]  Cell: [phone]

## 2022-01-20 NOTE — PROGRESS NOTE ADULT - PROBLEM SELECTOR PLAN 3
Hgb trend: 6.9 <-- , 6.9 <-- , 8.0 <-- , 7.8 <-- , 7.2 <--   Hg drop today  no obvious source of bleeding.  will transfuse 1 uprbcs Hgb trend: 8.2 <-- , 7.9 <-- , 6.9 <-- , 6.9 <-- , 8.0 <-- , 7.8 <--   Hg stable today  no obvious source of bleeding.

## 2022-01-20 NOTE — PROGRESS NOTE ADULT - PROBLEM SELECTOR PLAN 11
CT a/p with a 2.4 cm nodular soft tissue density in the bladder appears separate from the prostate gland, concerning for bladder lesion  - as per urology - findings concerning for bladder tumor vs prostate gland. cysto transurethral resection would require general anesthesia. recommended checking urine cytology to determine next steps which returned negative. will still need outpatient urology follow-up regardless once acute issues improve  - PSA normal, urine cytology negative for malignancy  - previously spoke to the patient's wife, Sandrita Bauer [phone]. Given the patient's underlying comorbid conditions coupled with his recent devastating neurologic events, other medical complications that ensued, and his poor neurologic and functional status at this time, it may be prudent to monitor his progress and see if he makes any meaningful recovery over the coming weeks to months to decide whether the benefit of pursuing any invasive work up for the bladder lesion outweighs the risk. Wife in agreement.  - Will require eventual outpatient f/u with Urology

## 2022-01-20 NOTE — PROGRESS NOTE ADULT - PROBLEM SELECTOR PLAN 2
Already completed course of PO vanco previously.  GI has a low suspicion for active C. Diff infection  - ID recommending continue PO vancomycin slow taper, 125 tid x 1 week, 125 BID x 1 week, 125 daily x 1 week, and 125 every other day x 2 weeks  - c/w Banatrol QID for loose stool, Cholestyramine and lomotil   - c/w lomotil to tid  - rectal tube removed.  - judicious use of abx therapy as may exacerbate c. diff

## 2022-01-20 NOTE — PROGRESS NOTE ADULT - SUBJECTIVE AND OBJECTIVE BOX
Chief Complaint:  Patient is a 61y old  Male who presents with a chief complaint of Posterior fossa hemorrhage (20 Jan 2022 11:31)      Date of service 01-20-22 @ 12:02      Interval Events:   patient seen and examined. Continues to have loose brown stools. BMs less frequent.     Hospital Medications:  acetaminophen    Suspension .. 650 milliGRAM(s) Enteral Tube every 6 hours PRN  acetylcysteine 20%  Inhalation 4 milliLiter(s) Inhalation every 6 hours  albuterol/ipratropium for Nebulization 3 milliLiter(s) Nebulizer every 6 hours  apixaban 5 milliGRAM(s) Enteral Tube every 12 hours  artificial  tears Solution 1 Drop(s) Both EYES every 6 hours  aspirin  chewable 81 milliGRAM(s) Oral daily  atorvastatin 40 milliGRAM(s) Oral at bedtime  chlorhexidine 0.12% Liquid 15 milliLiter(s) Oral Mucosa two times a day  cholestyramine Powder (Sugar-Free) 4 Gram(s) Oral two times a day  dextrose 50% Injectable 25 Gram(s) IV Push once  dextrose 50% Injectable 12.5 Gram(s) IV Push once  diphenoxylate/atropine 2 Tablet(s) Oral three times a day  doxazosin 2 milliGRAM(s) Oral daily  insulin lispro (ADMELOG) corrective regimen sliding scale   SubCutaneous every 6 hours  insulin NPH human recombinant 12 Unit(s) SubCutaneous every 6 hours  metoprolol tartrate 12.5 milliGRAM(s) Oral two times a day  multivitamin 1 Tablet(s) Oral daily  pantoprazole  Injectable 40 milliGRAM(s) IV Push daily  psyllium Powder 1 Packet(s) Oral daily  sodium chloride 3%  Inhalation 3 milliLiter(s) Inhalation every 6 hours  vancomycin    Solution 125 milliGRAM(s) Oral daily        Review of Systems:  unable to obtain    PHYSICAL EXAM:   Vital Signs:  Vital Signs Last 24 Hrs  T(C): 37.3 (20 Jan 2022 08:15), Max: 37.3 (20 Jan 2022 08:15)  T(F): 99.1 (20 Jan 2022 08:15), Max: 99.1 (20 Jan 2022 08:15)  HR: 94 (20 Jan 2022 08:15) (89 - 94)  BP: 151/99 (20 Jan 2022 08:15) (115/68 - 151/99)  BP(mean): --  RR: 18 (20 Jan 2022 08:15) (18 - 18)  SpO2: 98% (20 Jan 2022 08:15) (98% - 100%)  Daily     Daily       PHYSICAL EXAM:     GENERAL:  Appears stated age, well-groomed, well-nourished, no distress  HEENT:  NC/AT,  conjunctivae anicteric, clear and pink, +trach   NECK: supple, trachea midline  CHEST:  Full & symmetric excursion, no increased effort, breath sounds clear  HEART:  Regular rhythm, no JVD  ABDOMEN:  Soft, non-tender, non-distended, normoactive bowel sounds,  no masses , no hepatosplenomegaly, +gastrostomy   EXTREMITIES:  no cyanosis,clubbing or edema  SKIN:  No rash, erythema, or, ecchymoses, no jaundice  NEURO:  non-focal, no asterixis  RECTAL: Deferred      LABS Personally reviewed by me:                        8.2    8.17  )-----------( 500      ( 20 Jan 2022 08:18 )             26.2     Mean Cell Volume: 90.0 fl (01-20-22 @ 08:18)    01-20    137  |  99  |  14  ----------------------------<  81  4.2   |  29  |  0.55    Ca    9.1      20 Jan 2022 07:14  Mg     2.0     01-20    TPro  6.0  /  Alb  2.4<L>  /  TBili  0.3  /  DBili  x   /  AST  19  /  ALT  25  /  AlkPhos  93  01-20    LIVER FUNCTIONS - ( 20 Jan 2022 07:14 )  Alb: 2.4 g/dL / Pro: 6.0 g/dL / ALK PHOS: 93 U/L / ALT: 25 U/L / AST: 19 U/L / GGT: x           PT/INR - ( 20 Jan 2022 08:18 )   PT: 15.8 sec;   INR: 1.33 ratio         PTT - ( 20 Jan 2022 08:18 )  PTT:40.0 sec                            8.2    8.17  )-----------( 500      ( 20 Jan 2022 08:18 )             26.2                         7.9    8.22  )-----------( 461      ( 19 Jan 2022 17:20 )             25.1                         6.9    8.56  )-----------( 515      ( 19 Jan 2022 09:28 )             22.0                         6.9    7.78  )-----------( 509      ( 19 Jan 2022 06:02 )             22.5                         8.0    7.23  )-----------( 556      ( 18 Jan 2022 19:32 )             25.6       Imaging personally reviewed by me:

## 2022-01-21 LAB
ALBUMIN SERPL ELPH-MCNC: 2.6 G/DL — LOW (ref 3.3–5)
ALP SERPL-CCNC: 98 U/L — SIGNIFICANT CHANGE UP (ref 40–120)
ALT FLD-CCNC: 23 U/L — SIGNIFICANT CHANGE UP (ref 10–45)
ANION GAP SERPL CALC-SCNC: 12 MMOL/L — SIGNIFICANT CHANGE UP (ref 5–17)
AST SERPL-CCNC: 15 U/L — SIGNIFICANT CHANGE UP (ref 10–40)
BASOPHILS # BLD AUTO: 0.03 K/UL — SIGNIFICANT CHANGE UP (ref 0–0.2)
BASOPHILS NFR BLD AUTO: 0.3 % — SIGNIFICANT CHANGE UP (ref 0–2)
BILIRUB SERPL-MCNC: 0.2 MG/DL — SIGNIFICANT CHANGE UP (ref 0.2–1.2)
BUN SERPL-MCNC: 16 MG/DL — SIGNIFICANT CHANGE UP (ref 7–23)
CALCIUM SERPL-MCNC: 9.2 MG/DL — SIGNIFICANT CHANGE UP (ref 8.4–10.5)
CHLORIDE SERPL-SCNC: 101 MMOL/L — SIGNIFICANT CHANGE UP (ref 96–108)
CO2 SERPL-SCNC: 28 MMOL/L — SIGNIFICANT CHANGE UP (ref 22–31)
CREAT SERPL-MCNC: 0.6 MG/DL — SIGNIFICANT CHANGE UP (ref 0.5–1.3)
EOSINOPHIL # BLD AUTO: 0.48 K/UL — SIGNIFICANT CHANGE UP (ref 0–0.5)
EOSINOPHIL NFR BLD AUTO: 4.2 % — SIGNIFICANT CHANGE UP (ref 0–6)
GLUCOSE SERPL-MCNC: 140 MG/DL — HIGH (ref 70–99)
HCT VFR BLD CALC: 26.8 % — LOW (ref 39–50)
HGB BLD-MCNC: 8.3 G/DL — LOW (ref 13–17)
IMM GRANULOCYTES NFR BLD AUTO: 0.7 % — SIGNIFICANT CHANGE UP (ref 0–1.5)
LYMPHOCYTES # BLD AUTO: 19.6 % — SIGNIFICANT CHANGE UP (ref 13–44)
LYMPHOCYTES # BLD AUTO: 2.24 K/UL — SIGNIFICANT CHANGE UP (ref 1–3.3)
MAGNESIUM SERPL-MCNC: 2 MG/DL — SIGNIFICANT CHANGE UP (ref 1.6–2.6)
MCHC RBC-ENTMCNC: 28.3 PG — SIGNIFICANT CHANGE UP (ref 27–34)
MCHC RBC-ENTMCNC: 31 GM/DL — LOW (ref 32–36)
MCV RBC AUTO: 91.5 FL — SIGNIFICANT CHANGE UP (ref 80–100)
MONOCYTES # BLD AUTO: 0.74 K/UL — SIGNIFICANT CHANGE UP (ref 0–0.9)
MONOCYTES NFR BLD AUTO: 6.5 % — SIGNIFICANT CHANGE UP (ref 2–14)
NEUTROPHILS # BLD AUTO: 7.88 K/UL — HIGH (ref 1.8–7.4)
NEUTROPHILS NFR BLD AUTO: 68.7 % — SIGNIFICANT CHANGE UP (ref 43–77)
NRBC # BLD: 0 /100 WBCS — SIGNIFICANT CHANGE UP (ref 0–0)
PLATELET # BLD AUTO: 531 K/UL — HIGH (ref 150–400)
POTASSIUM SERPL-MCNC: 4 MMOL/L — SIGNIFICANT CHANGE UP (ref 3.5–5.3)
POTASSIUM SERPL-SCNC: 4 MMOL/L — SIGNIFICANT CHANGE UP (ref 3.5–5.3)
PROT SERPL-MCNC: 6.2 G/DL — SIGNIFICANT CHANGE UP (ref 6–8.3)
RBC # BLD: 2.93 M/UL — LOW (ref 4.2–5.8)
RBC # FLD: 14.3 % — SIGNIFICANT CHANGE UP (ref 10.3–14.5)
SODIUM SERPL-SCNC: 141 MMOL/L — SIGNIFICANT CHANGE UP (ref 135–145)
WBC # BLD: 11.45 K/UL — HIGH (ref 3.8–10.5)
WBC # FLD AUTO: 11.45 K/UL — HIGH (ref 3.8–10.5)

## 2022-01-21 PROCEDURE — 99233 SBSQ HOSP IP/OBS HIGH 50: CPT

## 2022-01-21 RX ADMIN — Medication 1 PACKET(S): at 11:53

## 2022-01-21 RX ADMIN — Medication 3 MILLILITER(S): at 11:48

## 2022-01-21 RX ADMIN — SODIUM CHLORIDE 3 MILLILITER(S): 9 INJECTION INTRAMUSCULAR; INTRAVENOUS; SUBCUTANEOUS at 11:48

## 2022-01-21 RX ADMIN — CHOLESTYRAMINE 4 GRAM(S): 4 POWDER, FOR SUSPENSION ORAL at 10:06

## 2022-01-21 RX ADMIN — CHOLESTYRAMINE 4 GRAM(S): 4 POWDER, FOR SUSPENSION ORAL at 21:44

## 2022-01-21 RX ADMIN — APIXABAN 5 MILLIGRAM(S): 2.5 TABLET, FILM COATED ORAL at 05:03

## 2022-01-21 RX ADMIN — Medication 2 TABLET(S): at 18:17

## 2022-01-21 RX ADMIN — Medication 3 MILLILITER(S): at 18:17

## 2022-01-21 RX ADMIN — Medication 1 DROP(S): at 05:04

## 2022-01-21 RX ADMIN — Medication 3 MILLILITER(S): at 05:03

## 2022-01-21 RX ADMIN — APIXABAN 5 MILLIGRAM(S): 2.5 TABLET, FILM COATED ORAL at 18:17

## 2022-01-21 RX ADMIN — Medication 4 MILLILITER(S): at 11:48

## 2022-01-21 RX ADMIN — Medication 2 TABLET(S): at 05:06

## 2022-01-21 RX ADMIN — Medication 12.5 MILLIGRAM(S): at 05:03

## 2022-01-21 RX ADMIN — HUMAN INSULIN 12 UNIT(S): 100 INJECTION, SUSPENSION SUBCUTANEOUS at 05:04

## 2022-01-21 RX ADMIN — HUMAN INSULIN 12 UNIT(S): 100 INJECTION, SUSPENSION SUBCUTANEOUS at 18:32

## 2022-01-21 RX ADMIN — Medication 1 DROP(S): at 11:50

## 2022-01-21 RX ADMIN — Medication 4 MILLILITER(S): at 05:04

## 2022-01-21 RX ADMIN — Medication 2 MILLIGRAM(S): at 05:03

## 2022-01-21 RX ADMIN — CHLORHEXIDINE GLUCONATE 15 MILLILITER(S): 213 SOLUTION TOPICAL at 18:16

## 2022-01-21 RX ADMIN — ATORVASTATIN CALCIUM 40 MILLIGRAM(S): 80 TABLET, FILM COATED ORAL at 21:44

## 2022-01-21 RX ADMIN — Medication 650 MILLIGRAM(S): at 05:03

## 2022-01-21 RX ADMIN — HUMAN INSULIN 12 UNIT(S): 100 INJECTION, SUSPENSION SUBCUTANEOUS at 11:58

## 2022-01-21 RX ADMIN — Medication 12.5 MILLIGRAM(S): at 18:18

## 2022-01-21 RX ADMIN — PANTOPRAZOLE SODIUM 40 MILLIGRAM(S): 20 TABLET, DELAYED RELEASE ORAL at 11:51

## 2022-01-21 RX ADMIN — Medication 4 MILLILITER(S): at 18:16

## 2022-01-21 RX ADMIN — Medication 125 MILLIGRAM(S): at 11:52

## 2022-01-21 RX ADMIN — Medication 81 MILLIGRAM(S): at 11:51

## 2022-01-21 RX ADMIN — SODIUM CHLORIDE 3 MILLILITER(S): 9 INJECTION INTRAMUSCULAR; INTRAVENOUS; SUBCUTANEOUS at 05:04

## 2022-01-21 RX ADMIN — CHLORHEXIDINE GLUCONATE 15 MILLILITER(S): 213 SOLUTION TOPICAL at 05:05

## 2022-01-21 RX ADMIN — Medication 2 TABLET(S): at 21:44

## 2022-01-21 RX ADMIN — Medication 1 DROP(S): at 18:21

## 2022-01-21 RX ADMIN — SODIUM CHLORIDE 3 MILLILITER(S): 9 INJECTION INTRAMUSCULAR; INTRAVENOUS; SUBCUTANEOUS at 18:18

## 2022-01-21 RX ADMIN — Medication 1 TABLET(S): at 11:50

## 2022-01-21 NOTE — PROGRESS NOTE ADULT - PROBLEM SELECTOR PLAN 7
Found to have Right soleal vein DVT and persistent left posterior tibial, peroneal, gastrocnemius and soleal vein DVT without proximal propagation  - started on Eliquis 12/2, monitor H/H Found to have Right soleal vein DVT and persistent left posterior tibial, peroneal, gastrocnemius and soleal vein DVT without proximal propagation  - c/w Eliquis 12/2, monitor H/H

## 2022-01-21 NOTE — PROGRESS NOTE ADULT - SUBJECTIVE AND OBJECTIVE BOX
Andre Reyes, M.D.  Pager: [phone]  Office: [phone]    Patient is a 61y old  Male who presents with a chief complaint of Posterior fossa hemorrhage (20 Jan 2022 11:31)          SUBJECTIVE / OVERNIGHT EVENTS:    No acute overnight events.    ROS: ( - ) Fever, ( - )Chills,  ( - )Nausea/Vomiting, ( - ) Cough, ( - )Shortness of breath, ( - )Chest Pain    MEDICATIONS  (STANDING):  acetylcysteine 20%  Inhalation 4 milliLiter(s) Inhalation every 6 hours  albuterol/ipratropium for Nebulization 3 milliLiter(s) Nebulizer every 6 hours  apixaban 5 milliGRAM(s) Enteral Tube every 12 hours  artificial  tears Solution 1 Drop(s) Both EYES every 6 hours  aspirin  chewable 81 milliGRAM(s) Oral daily  atorvastatin 40 milliGRAM(s) Oral at bedtime  chlorhexidine 0.12% Liquid 15 milliLiter(s) Oral Mucosa two times a day  cholestyramine Powder (Sugar-Free) 4 Gram(s) Oral two times a day  dextrose 50% Injectable 25 Gram(s) IV Push once  dextrose 50% Injectable 12.5 Gram(s) IV Push once  diphenoxylate/atropine 2 Tablet(s) Oral three times a day  doxazosin 2 milliGRAM(s) Oral daily  insulin lispro (ADMELOG) corrective regimen sliding scale   SubCutaneous every 6 hours  insulin NPH human recombinant 12 Unit(s) SubCutaneous every 6 hours  metoprolol tartrate 12.5 milliGRAM(s) Oral two times a day  multivitamin 1 Tablet(s) Oral daily  pantoprazole  Injectable 40 milliGRAM(s) IV Push daily  psyllium Powder 1 Packet(s) Oral daily  sodium chloride 3%  Inhalation 3 milliLiter(s) Inhalation every 6 hours  vancomycin    Solution 125 milliGRAM(s) Oral daily    MEDICATIONS  (PRN):  acetaminophen    Suspension .. 650 milliGRAM(s) Enteral Tube every 6 hours PRN Temp greater or equal to 38C (100.4F), Mild Pain (1 - 3)          T(C): 37.4 (01-21 @ 08:30), Max: 37.5 (01-21 @ 00:10)   HR: 101   BP: 124/75   RR: 18   SpO2: 99%    PHYSICAL EXAM:      LABS:                        8.3    11.45 )-----------( 531      ( 21 Jan 2022 05:45 )             26.8      01-21    141  |  101  |  16  ----------------------------<  140<H>  4.0   |  28  |  0.60    Ca    9.2      21 Jan 2022 05:45  Mg     2.0     01-21    TPro  6.2  /  Alb  2.6<L>  /  TBili  0.2  /  DBili  x   /  AST  15  /  ALT  23  /  AlkPhos  98  01-21       CAPILLARY BLOOD GLUCOSE      POCT Blood Glucose.: 162 mg/dL (21 Jan 2022 07:50)  POCT Blood Glucose.: 139 mg/dL (21 Jan 2022 04:48)  POCT Blood Glucose.: 92 mg/dL (20 Jan 2022 23:41)  POCT Blood Glucose.: 155 mg/dL (20 Jan 2022 17:30)  POCT Blood Glucose.: 172 mg/dL (20 Jan 2022 11:50)      RADIOLOGY & ADDITIONAL TESTS:    Imaging Personally Reviewed:  Consultant(s) Notes Reviewed:    Care Discussed with Consultants/Other Providers:   Andre Reyes, M.D.  Pager: [phone]  Office: [phone]    Patient is a 61y old  Male who presents with a chief complaint of Posterior fossa hemorrhage (20 Jan 2022 11:31)          SUBJECTIVE / OVERNIGHT EVENTS:    No acute overnight events.  No new nursing complaints    Unable to get ROS due to Cognitive impairment    MEDICATIONS  (STANDING):  acetylcysteine 20%  Inhalation 4 milliLiter(s) Inhalation every 6 hours  albuterol/ipratropium for Nebulization 3 milliLiter(s) Nebulizer every 6 hours  apixaban 5 milliGRAM(s) Enteral Tube every 12 hours  artificial  tears Solution 1 Drop(s) Both EYES every 6 hours  aspirin  chewable 81 milliGRAM(s) Oral daily  atorvastatin 40 milliGRAM(s) Oral at bedtime  chlorhexidine 0.12% Liquid 15 milliLiter(s) Oral Mucosa two times a day  cholestyramine Powder (Sugar-Free) 4 Gram(s) Oral two times a day  dextrose 50% Injectable 25 Gram(s) IV Push once  dextrose 50% Injectable 12.5 Gram(s) IV Push once  diphenoxylate/atropine 2 Tablet(s) Oral three times a day  doxazosin 2 milliGRAM(s) Oral daily  insulin lispro (ADMELOG) corrective regimen sliding scale   SubCutaneous every 6 hours  insulin NPH human recombinant 12 Unit(s) SubCutaneous every 6 hours  metoprolol tartrate 12.5 milliGRAM(s) Oral two times a day  multivitamin 1 Tablet(s) Oral daily  pantoprazole  Injectable 40 milliGRAM(s) IV Push daily  psyllium Powder 1 Packet(s) Oral daily  sodium chloride 3%  Inhalation 3 milliLiter(s) Inhalation every 6 hours  vancomycin    Solution 125 milliGRAM(s) Oral daily    MEDICATIONS  (PRN):  acetaminophen    Suspension .. 650 milliGRAM(s) Enteral Tube every 6 hours PRN Temp greater or equal to 38C (100.4F), Mild Pain (1 - 3)        T(C): 37.4 (01-21 @ 08:30), Max: 37.5 (01-21 @ 00:10)   HR: 101   BP: 124/75   RR: 18   SpO2: 99%    PHYSICAL EXAM:    CONSTITUTIONAL: Chronically ill appearing male laying in bed in NAD  EYES: Opening eyes, clear sclera  NECK: +Trach  RESPIRATORY: Normal respiratory effort; rhonchi bilaterally in upper lung fields  CARDIOVASCULAR: Regular rate and rhythm, normal S1 and S2, no murmur/rub/gallop; No lower extremity edema  ABDOMEN: Nontender to palpation, normoactive bowel sounds, no rebound/guarding, +PEG, + rectal tube  MUSCULOSKELETAL: No clubbing or cyanosis of digits; no joint swelling or tenderness to palpation  PSYCH: Awake, alert, unable to assess orientation as patient nonverbal  NEUROLOGY: Able to make fist with both hands, infrequently able to follow simple commands  SKIN: + sacral       LABS:                        8.3    11.45 )-----------( 531      ( 21 Jan 2022 05:45 )             26.8      01-21    141  |  101  |  16  ----------------------------<  140<H>  4.0   |  28  |  0.60    Ca    9.2      21 Jan 2022 05:45  Mg     2.0     01-21    TPro  6.2  /  Alb  2.6<L>  /  TBili  0.2  /  DBili  x   /  AST  15  /  ALT  23  /  AlkPhos  98  01-21       CAPILLARY BLOOD GLUCOSE      POCT Blood Glucose.: 162 mg/dL (21 Jan 2022 07:50)  POCT Blood Glucose.: 139 mg/dL (21 Jan 2022 04:48)  POCT Blood Glucose.: 92 mg/dL (20 Jan 2022 23:41)  POCT Blood Glucose.: 155 mg/dL (20 Jan 2022 17:30)  POCT Blood Glucose.: 172 mg/dL (20 Jan 2022 11:50)      RADIOLOGY & ADDITIONAL TESTS:    Imaging Personally Reviewed:  Consultant(s) Notes Reviewed:    Care Discussed with Consultants/Other Providers:

## 2022-01-21 NOTE — PROGRESS NOTE ADULT - SUBJECTIVE AND OBJECTIVE BOX
Chief Complaint:  Patient is a 61y old  Male who presents with a chief complaint of Posterior fossa hemorrhage (21 Jan 2022 10:30)      Date of service 01-21-22 @ 11:36      Interval Events:   Patient seen and examined. No acute overnight events.     Hospital Medications:  acetaminophen    Suspension .. 650 milliGRAM(s) Enteral Tube every 6 hours PRN  acetylcysteine 20%  Inhalation 4 milliLiter(s) Inhalation every 6 hours  albuterol/ipratropium for Nebulization 3 milliLiter(s) Nebulizer every 6 hours  apixaban 5 milliGRAM(s) Enteral Tube every 12 hours  artificial  tears Solution 1 Drop(s) Both EYES every 6 hours  aspirin  chewable 81 milliGRAM(s) Oral daily  atorvastatin 40 milliGRAM(s) Oral at bedtime  chlorhexidine 0.12% Liquid 15 milliLiter(s) Oral Mucosa two times a day  cholestyramine Powder (Sugar-Free) 4 Gram(s) Oral two times a day  dextrose 50% Injectable 25 Gram(s) IV Push once  dextrose 50% Injectable 12.5 Gram(s) IV Push once  diphenoxylate/atropine 2 Tablet(s) Oral three times a day  doxazosin 2 milliGRAM(s) Oral daily  insulin lispro (ADMELOG) corrective regimen sliding scale   SubCutaneous every 6 hours  insulin NPH human recombinant 12 Unit(s) SubCutaneous every 6 hours  metoprolol tartrate 12.5 milliGRAM(s) Oral two times a day  multivitamin 1 Tablet(s) Oral daily  pantoprazole  Injectable 40 milliGRAM(s) IV Push daily  psyllium Powder 1 Packet(s) Oral daily  sodium chloride 3%  Inhalation 3 milliLiter(s) Inhalation every 6 hours  vancomycin    Solution 125 milliGRAM(s) Oral daily        Review of Systems:  unable to obtain    PHYSICAL EXAM:   Vital Signs:  Vital Signs Last 24 Hrs  T(C): 37.4 (21 Jan 2022 08:30), Max: 37.5 (21 Jan 2022 00:10)  T(F): 99.4 (21 Jan 2022 08:30), Max: 99.5 (21 Jan 2022 00:10)  HR: 101 (21 Jan 2022 08:30) (97 - 112)  BP: 124/75 (21 Jan 2022 08:30) (120/75 - 136/84)  BP(mean): --  RR: 18 (21 Jan 2022 08:30) (18 - 20)  SpO2: 99% (21 Jan 2022 08:30) (98% - 100%)  Daily     Daily       PHYSICAL EXAM:     GENERAL:  Appears stated age, well-groomed, well-nourished, no distress  HEENT:  NC/AT,  conjunctivae anicteric, clear and pink,   NECK: supple, trachea midline  CHEST:  Full & symmetric excursion, no increased effort, breath sounds clear  HEART:  Regular rhythm, no JVD  ABDOMEN:  Soft, non-tender, non-distended, normoactive bowel sounds,  no masses , no hepatosplenomegaly, +gastrostomy  EXTREMITIES:  no cyanosis,clubbing or edema  SKIN:  No rash, erythema, or, ecchymoses, no jaundice  NEURO: non-focal, no asterixis  RECTAL: Deferred      LABS Personally reviewed by me:                        8.3    11.45 )-----------( 531      ( 21 Jan 2022 05:45 )             26.8     Mean Cell Volume: 91.5 fl (01-21-22 @ 05:45)    01-21    141  |  101  |  16  ----------------------------<  140<H>  4.0   |  28  |  0.60    Ca    9.2      21 Jan 2022 05:45  Mg     2.0     01-21    TPro  6.2  /  Alb  2.6<L>  /  TBili  0.2  /  DBili  x   /  AST  15  /  ALT  23  /  AlkPhos  98  01-21    LIVER FUNCTIONS - ( 21 Jan 2022 05:45 )  Alb: 2.6 g/dL / Pro: 6.2 g/dL / ALK PHOS: 98 U/L / ALT: 23 U/L / AST: 15 U/L / GGT: x           PT/INR - ( 20 Jan 2022 08:18 )   PT: 15.8 sec;   INR: 1.33 ratio         PTT - ( 20 Jan 2022 08:18 )  PTT:40.0 sec                            8.3    11.45 )-----------( 531      ( 21 Jan 2022 05:45 )             26.8                         8.2    8.17  )-----------( 500      ( 20 Jan 2022 08:18 )             26.2                         7.9    8.22  )-----------( 461      ( 19 Jan 2022 17:20 )             25.1                         6.9    8.56  )-----------( 515      ( 19 Jan 2022 09:28 )             22.0                         6.9    7.78  )-----------( 509      ( 19 Jan 2022 06:02 )             22.5       Imaging personally reviewed by me:

## 2022-01-21 NOTE — PROGRESS NOTE ADULT - PROBLEM SELECTOR PLAN 2
Already completed course of PO vanco previously.  GI has a low suspicion for active C. Diff infection  - ID recommending continue PO vancomycin slow taper, 125 tid x 1 week, 125 BID x 1 week, 125 daily x 1 week, and 125 every other day x 2 weeks  - c/w Banatrol QID for loose stool, Cholestyramine and lomotil   - c/w lomotil to tid  - rectal tube removed.  - judicious use of abx therapy as may exacerbate c. diff Already completed course of PO vanco previously.  GI has a low suspicion for active C. Diff infection  - ID recommending continue PO vancomycin slow taper, 125 tid x 1 week, 125 BID x 1 week, 125 daily x 1 week, and 125 every other day x 2 weeks  - c/w Banatrol QID for loose stool, Cholestyramine(will decrease frequency) and c/w lomotil   - c/w lomotil to tid  - judicious use of abx therapy as may exacerbate c. diff

## 2022-01-21 NOTE — PROGRESS NOTE ADULT - PROBLEM SELECTOR PLAN 3
Hgb trend: 8.2 <-- , 7.9 <-- , 6.9 <-- , 6.9 <-- , 8.0 <-- , 7.8 <--   Hg stable today  no obvious source of bleeding. Hgb trend: 8.3 <-- , 8.2 <-- , 7.9 <-- , 6.9 <-- , 6.9 <-- , 8.0 <--   Hg stable today  no obvious source of bleeding.  there is presumed anemia of chronic disease given no obvious bleeding

## 2022-01-21 NOTE — PROGRESS NOTE ADULT - PROBLEM SELECTOR PLAN 11
CT a/p with a 2.4 cm nodular soft tissue density in the bladder appears separate from the prostate gland, concerning for bladder lesion  - as per urology - findings concerning for bladder tumor vs prostate gland. cysto transurethral resection would require general anesthesia. recommended checking urine cytology to determine next steps which returned negative. will still need outpatient urology follow-up regardless once acute issues improve  - PSA normal, urine cytology negative for malignancy  - previously spoke to the patient's wife, Sandrita Bauer [phone]. Given the patient's underlying comorbid conditions coupled with his recent devastating neurologic events, other medical complications that ensued, and his poor neurologic and functional status at this time, it may be prudent to monitor his progress and see if he makes any meaningful recovery over the coming weeks to months to decide whether the benefit of pursuing any invasive work up for the bladder lesion outweighs the risk. Wife in agreement.  - Will require eventual outpatient f/u with Urology

## 2022-01-21 NOTE — PROGRESS NOTE ADULT - ASSESSMENT
61 year old man with respiratory failure d/t ICH    1. ICH  -per neurosurgery  -s/p  shunt    2. Respiratory failure  - for tracheostomy, will require long term enteral nutrition  - s/p PEG, continue TF and use of abdominal binder  - banatrol added for loose stool    3. Enterobacter PNA  -s/p course of antibiotics     4. Anemia, no overt GI bleed. EGD unremarkable. Anemia of chronic disease  -hgb stable  -monitor for GI bleeding     5. Cdiff infection   -rectal tube removed, decreased frequency of loose stools    -on vanco taper  -doubt active cdiff at this time  -lomotil added     6. DVT on a/c  -apixaban 5mg resumed  -continue PPI     7. Pneumothorax  -per CTS    Attending supervision statement: I have personally seen and examined the patient. I fully participated in the care of this patient. I have made amendments to the documentation where necessary, and agree with the history, physical exam, and plan as outlined by the ACP.      [facility]  Gastroenterology and Hepatology  [address] [place], [place]  Office: [phone]  Cell: [phone]

## 2022-01-22 LAB
BASOPHILS # BLD AUTO: 0.04 K/UL — SIGNIFICANT CHANGE UP (ref 0–0.2)
BASOPHILS NFR BLD AUTO: 0.4 % — SIGNIFICANT CHANGE UP (ref 0–2)
EOSINOPHIL # BLD AUTO: 0.44 K/UL — SIGNIFICANT CHANGE UP (ref 0–0.5)
EOSINOPHIL NFR BLD AUTO: 4.3 % — SIGNIFICANT CHANGE UP (ref 0–6)
HCT VFR BLD CALC: 27.7 % — LOW (ref 39–50)
HGB BLD-MCNC: 8.7 G/DL — LOW (ref 13–17)
IMM GRANULOCYTES NFR BLD AUTO: 0.6 % — SIGNIFICANT CHANGE UP (ref 0–1.5)
LYMPHOCYTES # BLD AUTO: 19.6 % — SIGNIFICANT CHANGE UP (ref 13–44)
LYMPHOCYTES # BLD AUTO: 2.01 K/UL — SIGNIFICANT CHANGE UP (ref 1–3.3)
MCHC RBC-ENTMCNC: 29 PG — SIGNIFICANT CHANGE UP (ref 27–34)
MCHC RBC-ENTMCNC: 31.4 GM/DL — LOW (ref 32–36)
MCV RBC AUTO: 92.3 FL — SIGNIFICANT CHANGE UP (ref 80–100)
MONOCYTES # BLD AUTO: 0.72 K/UL — SIGNIFICANT CHANGE UP (ref 0–0.9)
MONOCYTES NFR BLD AUTO: 7 % — SIGNIFICANT CHANGE UP (ref 2–14)
NEUTROPHILS # BLD AUTO: 6.99 K/UL — SIGNIFICANT CHANGE UP (ref 1.8–7.4)
NEUTROPHILS NFR BLD AUTO: 68.1 % — SIGNIFICANT CHANGE UP (ref 43–77)
NRBC # BLD: 0 /100 WBCS — SIGNIFICANT CHANGE UP (ref 0–0)
PLATELET # BLD AUTO: 527 K/UL — HIGH (ref 150–400)
RBC # BLD: 3 M/UL — LOW (ref 4.2–5.8)
RBC # FLD: 14 % — SIGNIFICANT CHANGE UP (ref 10.3–14.5)
WBC # BLD: 10.26 K/UL — SIGNIFICANT CHANGE UP (ref 3.8–10.5)
WBC # FLD AUTO: 10.26 K/UL — SIGNIFICANT CHANGE UP (ref 3.8–10.5)

## 2022-01-22 PROCEDURE — 99232 SBSQ HOSP IP/OBS MODERATE 35: CPT

## 2022-01-22 RX ADMIN — Medication 1 DROP(S): at 06:10

## 2022-01-22 RX ADMIN — ATORVASTATIN CALCIUM 40 MILLIGRAM(S): 80 TABLET, FILM COATED ORAL at 23:06

## 2022-01-22 RX ADMIN — SODIUM CHLORIDE 3 MILLILITER(S): 9 INJECTION INTRAMUSCULAR; INTRAVENOUS; SUBCUTANEOUS at 06:10

## 2022-01-22 RX ADMIN — HUMAN INSULIN 12 UNIT(S): 100 INJECTION, SUSPENSION SUBCUTANEOUS at 12:09

## 2022-01-22 RX ADMIN — HUMAN INSULIN 12 UNIT(S): 100 INJECTION, SUSPENSION SUBCUTANEOUS at 00:34

## 2022-01-22 RX ADMIN — Medication 2 TABLET(S): at 13:23

## 2022-01-22 RX ADMIN — HUMAN INSULIN 12 UNIT(S): 100 INJECTION, SUSPENSION SUBCUTANEOUS at 17:49

## 2022-01-22 RX ADMIN — Medication 2 TABLET(S): at 06:10

## 2022-01-22 RX ADMIN — Medication 12.5 MILLIGRAM(S): at 06:09

## 2022-01-22 RX ADMIN — Medication 3 MILLILITER(S): at 00:32

## 2022-01-22 RX ADMIN — CHOLESTYRAMINE 4 GRAM(S): 4 POWDER, FOR SUSPENSION ORAL at 08:28

## 2022-01-22 RX ADMIN — PANTOPRAZOLE SODIUM 40 MILLIGRAM(S): 20 TABLET, DELAYED RELEASE ORAL at 12:10

## 2022-01-22 RX ADMIN — Medication 12.5 MILLIGRAM(S): at 17:50

## 2022-01-22 RX ADMIN — Medication 1 DROP(S): at 17:50

## 2022-01-22 RX ADMIN — CHOLESTYRAMINE 4 GRAM(S): 4 POWDER, FOR SUSPENSION ORAL at 21:15

## 2022-01-22 RX ADMIN — Medication 4 MILLILITER(S): at 06:11

## 2022-01-22 RX ADMIN — SODIUM CHLORIDE 3 MILLILITER(S): 9 INJECTION INTRAMUSCULAR; INTRAVENOUS; SUBCUTANEOUS at 17:48

## 2022-01-22 RX ADMIN — HUMAN INSULIN 12 UNIT(S): 100 INJECTION, SUSPENSION SUBCUTANEOUS at 06:11

## 2022-01-22 RX ADMIN — SODIUM CHLORIDE 3 MILLILITER(S): 9 INJECTION INTRAMUSCULAR; INTRAVENOUS; SUBCUTANEOUS at 00:32

## 2022-01-22 RX ADMIN — CHLORHEXIDINE GLUCONATE 15 MILLILITER(S): 213 SOLUTION TOPICAL at 17:50

## 2022-01-22 RX ADMIN — Medication 2 TABLET(S): at 23:08

## 2022-01-22 RX ADMIN — Medication 2 MILLIGRAM(S): at 06:09

## 2022-01-22 RX ADMIN — SODIUM CHLORIDE 3 MILLILITER(S): 9 INJECTION INTRAMUSCULAR; INTRAVENOUS; SUBCUTANEOUS at 23:11

## 2022-01-22 RX ADMIN — Medication 2: at 17:49

## 2022-01-22 RX ADMIN — Medication 1 DROP(S): at 00:34

## 2022-01-22 RX ADMIN — Medication 4 MILLILITER(S): at 17:48

## 2022-01-22 RX ADMIN — Medication 4 MILLILITER(S): at 23:12

## 2022-01-22 RX ADMIN — CHLORHEXIDINE GLUCONATE 15 MILLILITER(S): 213 SOLUTION TOPICAL at 06:10

## 2022-01-22 RX ADMIN — Medication 1 DROP(S): at 12:10

## 2022-01-22 RX ADMIN — SODIUM CHLORIDE 3 MILLILITER(S): 9 INJECTION INTRAMUSCULAR; INTRAVENOUS; SUBCUTANEOUS at 12:08

## 2022-01-22 RX ADMIN — Medication 3 MILLILITER(S): at 23:11

## 2022-01-22 RX ADMIN — HUMAN INSULIN 12 UNIT(S): 100 INJECTION, SUSPENSION SUBCUTANEOUS at 23:51

## 2022-01-22 RX ADMIN — Medication 4 MILLILITER(S): at 12:08

## 2022-01-22 RX ADMIN — Medication 1 DROP(S): at 23:10

## 2022-01-22 RX ADMIN — Medication 125 MILLIGRAM(S): at 12:09

## 2022-01-22 RX ADMIN — Medication 3 MILLILITER(S): at 17:48

## 2022-01-22 RX ADMIN — APIXABAN 5 MILLIGRAM(S): 2.5 TABLET, FILM COATED ORAL at 06:09

## 2022-01-22 RX ADMIN — Medication 1 TABLET(S): at 12:11

## 2022-01-22 RX ADMIN — Medication 3 MILLILITER(S): at 06:09

## 2022-01-22 RX ADMIN — Medication 4 MILLILITER(S): at 00:32

## 2022-01-22 RX ADMIN — Medication 81 MILLIGRAM(S): at 12:10

## 2022-01-22 RX ADMIN — Medication 1 PACKET(S): at 12:11

## 2022-01-22 RX ADMIN — Medication 3 MILLILITER(S): at 12:07

## 2022-01-22 RX ADMIN — APIXABAN 5 MILLIGRAM(S): 2.5 TABLET, FILM COATED ORAL at 17:50

## 2022-01-22 NOTE — PROGRESS NOTE ADULT - SUBJECTIVE AND OBJECTIVE BOX
Patient is a 61y Male     Patient is a 61y old  Male who presents with a chief complaint of Posterior fossa hemorrhage (22 Jan 2022 11:16)      HPI:  61M hx HTN, DM, cardiac stent on ASA. At work complaining of HA ~8:30, starting feeling dizzy and vomiting, HTN/keke when EMS got to him. SBP 220s, HR 50s.     On EMS arrival at 09:39AM 11/4/21, patient seen talking a little, garbling, moving all extremities, then quickly became unresponsive. No known blood thinners.    CTH shows large posterior fossa bleed w/ IVH/SAH/hydro.    Exam:  Pre/intubation exam: no eyes open, pupils 2b/l, + corneals/cough/gag, not FC, LUE spont fingers moving, flacid otherwise    ICU Vital Signs Last 24 Hrs  T(C): 34.8 (04 Nov 2021 12:00), Max: 34.8 (04 Nov 2021 12:00)  T(F): 94.6 (04 Nov 2021 12:00), Max: 94.6 (04 Nov 2021 12:00)  HR: 127 (04 Nov 2021 11:30) (109 - 146)  BP: 123/78 (04 Nov 2021 11:30) (123/78 - 245/142)  BP(mean): --  ABP: --  ABP(mean): --  RR: 19 (04 Nov 2021 11:30) (19 - 30)  SpO2: 100% (04 Nov 2021 11:30) (100% - 100%)  11-04    140  |  103  |  18  ----------------------------<  226<H>  3.3<L>   |  19<L>  |  1.24    Ca    9.5      04 Nov 2021 10:16    TPro  7.8  /  Alb  4.8  /  TBili  0.4  /  DBili  x   /  AST  18  /  ALT  14  /  AlkPhos  94  11-04                        12.9   15.29 )-----------( 322      ( 04 Nov 2021 10:16 )             40.5    (04 Nov 2021 12:28)      PAST MEDICAL & SURGICAL HISTORY:  HTN (hypertension)    Diabetes mellitus        MEDICATIONS  (STANDING):  acetylcysteine 20%  Inhalation 4 milliLiter(s) Inhalation every 6 hours  albuterol/ipratropium for Nebulization 3 milliLiter(s) Nebulizer every 6 hours  apixaban 5 milliGRAM(s) Enteral Tube every 12 hours  artificial  tears Solution 1 Drop(s) Both EYES every 6 hours  aspirin  chewable 81 milliGRAM(s) Oral daily  atorvastatin 40 milliGRAM(s) Oral at bedtime  chlorhexidine 0.12% Liquid 15 milliLiter(s) Oral Mucosa two times a day  cholestyramine Powder (Sugar-Free) 4 Gram(s) Oral two times a day  dextrose 50% Injectable 25 Gram(s) IV Push once  dextrose 50% Injectable 12.5 Gram(s) IV Push once  diphenoxylate/atropine 2 Tablet(s) Oral three times a day  doxazosin 2 milliGRAM(s) Oral daily  insulin lispro (ADMELOG) corrective regimen sliding scale   SubCutaneous every 6 hours  insulin NPH human recombinant 12 Unit(s) SubCutaneous every 6 hours  metoprolol tartrate 12.5 milliGRAM(s) Oral two times a day  multivitamin 1 Tablet(s) Oral daily  pantoprazole  Injectable 40 milliGRAM(s) IV Push daily  psyllium Powder 1 Packet(s) Oral daily  sodium chloride 3%  Inhalation 3 milliLiter(s) Inhalation every 6 hours  vancomycin    Solution 125 milliGRAM(s) Oral daily      Allergies    penicillin (Other)    Intolerances        SOCIAL HISTORY:  Denies ETOh,Smoking,     FAMILY HISTORY:      REVIEW OF SYSTEMS:    CONSTITUTIONAL: No weakness, fevers or chills  EYES/ENT: No visual changes;  No vertigo or throat pain   NECK: No pain or stiffness  RESPIRATORY: No cough, wheezing, hemoptysis; No shortness of breath  CARDIOVASCULAR: No chest pain or palpitations  GASTROINTESTINAL: No abdominal or epigastric pain. No nausea, vomiting, or hematemesis; No diarrhea or constipation. No melena or hematochezia.  GENITOURINARY: No dysuria, frequency or hematuria  NEUROLOGICAL: No numbness or weakness  SKIN: No itching, burning, rashes, or lesions   All other review of systems is negative unless indicated above.    VITAL:  T(C): , Max: 37 (01-21-22 @ 16:00)  T(F): , Max: 98.6 (01-21-22 @ 16:00)  HR: 86 (01-22-22 @ 08:00)  BP: 135/77 (01-22-22 @ 08:00)  BP(mean): --  RR: 18 (01-22-22 @ 08:00)  SpO2: 97% (01-22-22 @ 08:00)  Wt(kg): --    I and O's:    01-20 @ 07:01  -  01-21 @ 07:00  --------------------------------------------------------  IN: 975 mL / OUT: 2000 mL / NET: -1025 mL    01-21 @ 07:01  -  01-22 @ 07:00  --------------------------------------------------------  IN: 1650 mL / OUT: 1650 mL / NET: 0 mL          PHYSICAL EXAM:    Constitutional: NAD  HEENT: PERRLA,   Neck: No JVD  Respiratory: CTA B/L  Cardiovascular: S1 and S2  Gastrointestinal: BS+, soft, NT/ND  Extremities: No peripheral edema  Neurological: A/O x 3, no focal deficits  Psychiatric: Normal mood, normal affect  : No Barney  Skin: No rashes  Access: Not applicable  Back: No CVA tenderness    LABS:                        8.7    10.26 )-----------( 527      ( 22 Jan 2022 05:34 )             27.7     01-21    141  |  101  |  16  ----------------------------<  140<H>  4.0   |  28  |  0.60    Ca    9.2      21 Jan 2022 05:45  Mg     2.0     01-21    TPro  6.2  /  Alb  2.6<L>  /  TBili  0.2  /  DBili  x   /  AST  15  /  ALT  23  /  AlkPhos  98  01-21          RADIOLOGY & ADDITIONAL STUDIES:

## 2022-01-22 NOTE — PROGRESS NOTE ADULT - SUBJECTIVE AND OBJECTIVE BOX
[facility] Division of Hospital Medicine  Philippe Carr DO  Pager (KIMBERLY, 3E-7T): [phone]  Other Times:  [phone]    Patient is a 61y old  Male who presents with a chief complaint of Posterior fossa hemorrhage (21 Jan 2022 10:30)      SUBJECTIVE / OVERNIGHT EVENTS:  ADDITIONAL REVIEW OF SYSTEMS:    MEDICATIONS  (STANDING):  acetylcysteine 20%  Inhalation 4 milliLiter(s) Inhalation every 6 hours  albuterol/ipratropium for Nebulization 3 milliLiter(s) Nebulizer every 6 hours  apixaban 5 milliGRAM(s) Enteral Tube every 12 hours  artificial  tears Solution 1 Drop(s) Both EYES every 6 hours  aspirin  chewable 81 milliGRAM(s) Oral daily  atorvastatin 40 milliGRAM(s) Oral at bedtime  chlorhexidine 0.12% Liquid 15 milliLiter(s) Oral Mucosa two times a day  cholestyramine Powder (Sugar-Free) 4 Gram(s) Oral two times a day  dextrose 50% Injectable 25 Gram(s) IV Push once  dextrose 50% Injectable 12.5 Gram(s) IV Push once  diphenoxylate/atropine 2 Tablet(s) Oral three times a day  doxazosin 2 milliGRAM(s) Oral daily  insulin lispro (ADMELOG) corrective regimen sliding scale   SubCutaneous every 6 hours  insulin NPH human recombinant 12 Unit(s) SubCutaneous every 6 hours  metoprolol tartrate 12.5 milliGRAM(s) Oral two times a day  multivitamin 1 Tablet(s) Oral daily  pantoprazole  Injectable 40 milliGRAM(s) IV Push daily  psyllium Powder 1 Packet(s) Oral daily  sodium chloride 3%  Inhalation 3 milliLiter(s) Inhalation every 6 hours  vancomycin    Solution 125 milliGRAM(s) Oral daily    MEDICATIONS  (PRN):  acetaminophen    Suspension .. 650 milliGRAM(s) Enteral Tube every 6 hours PRN Temp greater or equal to 38C (100.4F), Mild Pain (1 - 3)      CAPILLARY BLOOD GLUCOSE      POCT Blood Glucose.: 137 mg/dL (22 Jan 2022 08:05)  POCT Blood Glucose.: 108 mg/dL (22 Jan 2022 05:50)  POCT Blood Glucose.: 136 mg/dL (22 Jan 2022 00:11)  POCT Blood Glucose.: 140 mg/dL (21 Jan 2022 18:23)  POCT Blood Glucose.: 138 mg/dL (21 Jan 2022 11:38)    I&O's Summary    21 Jan 2022 07:01  -  22 Jan 2022 07:00  --------------------------------------------------------  IN: 1650 mL / OUT: 1650 mL / NET: 0 mL        PHYSICAL EXAM:  Vital Signs Last 24 Hrs  T(C): 36.4 (22 Jan 2022 08:00), Max: 37.2 (21 Jan 2022 12:39)  T(F): 97.6 (22 Jan 2022 08:00), Max: 99 (21 Jan 2022 12:39)  HR: 86 (22 Jan 2022 08:00) (80 - 101)  BP: 135/77 (22 Jan 2022 08:00) (105/70 - 149/84)  BP(mean): --  RR: 18 (22 Jan 2022 08:00) (18 - 18)  SpO2: 97% (22 Jan 2022 08:00) (97% - 100%)  CONSTITUTIONAL: NAD, well-developed, well-groomed  EYES: PERRLA; conjunctiva and sclera clear  ENMT: Moist oral mucosa, no pharyngeal injection or exudates; normal dentition  NECK: Supple, no palpable masses; no thyromegaly  RESPIRATORY: Normal respiratory effort; lungs are clear to auscultation bilaterally  CARDIOVASCULAR: Regular rate and rhythm, normal S1 and S2, no murmur/rub/gallop; No lower extremity edema; Peripheral pulses are 2+ bilaterally  ABDOMEN: Nontender to palpation, normoactive bowel sounds, no rebound/guarding; No hepatosplenomegaly  MUSCULOSKELETAL:  Normal gait; no clubbing or cyanosis of digits; no joint swelling or tenderness to palpation  PSYCH: A+O to person, place, and time; affect appropriate  NEUROLOGY: CN 2-12 are intact and symmetric; no gross sensory deficits   SKIN: No rashes; no palpable lesions    LABS:                        8.7    10.26 )-----------( 527      ( 22 Jan 2022 05:34 )             27.7     01-21    141  |  101  |  16  ----------------------------<  140<H>  4.0   |  28  |  0.60    Ca    9.2      21 Jan 2022 05:45  Mg     2.0     01-21    TPro  6.2  /  Alb  2.6<L>  /  TBili  0.2  /  DBili  x   /  AST  15  /  ALT  23  /  AlkPhos  98  01-21                RADIOLOGY & ADDITIONAL TESTS:  Results Reviewed:   Imaging Personally Reviewed:  Electrocardiogram Personally Reviewed:    COORDINATION OF CARE:  Care Discussed with Consultants/Other Providers [Y/N]:  Prior or Outpatient Records Reviewed [Y/N]:   [facility] Division of Hospital Medicine  Philippe Carr DO  Pager (KIMBERLY, 8O-1I): [phone]  Other Times:  [phone]    Patient is a 61y old  Male who presents with a chief complaint of Posterior fossa hemorrhage (21 Jan 2022 10:30)    SUBJECTIVE / OVERNIGHT EVENTS: No acute events overnight. Patient unable to participate in review of systems due to clinical status.    ADDITIONAL REVIEW OF SYSTEMS: Patient unable to participate due to clinical status.    MEDICATIONS  (STANDING):  acetylcysteine 20%  Inhalation 4 milliLiter(s) Inhalation every 6 hours  albuterol/ipratropium for Nebulization 3 milliLiter(s) Nebulizer every 6 hours  apixaban 5 milliGRAM(s) Enteral Tube every 12 hours  artificial  tears Solution 1 Drop(s) Both EYES every 6 hours  aspirin  chewable 81 milliGRAM(s) Oral daily  atorvastatin 40 milliGRAM(s) Oral at bedtime  chlorhexidine 0.12% Liquid 15 milliLiter(s) Oral Mucosa two times a day  cholestyramine Powder (Sugar-Free) 4 Gram(s) Oral two times a day  dextrose 50% Injectable 25 Gram(s) IV Push once  dextrose 50% Injectable 12.5 Gram(s) IV Push once  diphenoxylate/atropine 2 Tablet(s) Oral three times a day  doxazosin 2 milliGRAM(s) Oral daily  insulin lispro (ADMELOG) corrective regimen sliding scale   SubCutaneous every 6 hours  insulin NPH human recombinant 12 Unit(s) SubCutaneous every 6 hours  metoprolol tartrate 12.5 milliGRAM(s) Oral two times a day  multivitamin 1 Tablet(s) Oral daily  pantoprazole  Injectable 40 milliGRAM(s) IV Push daily  psyllium Powder 1 Packet(s) Oral daily  sodium chloride 3%  Inhalation 3 milliLiter(s) Inhalation every 6 hours  vancomycin    Solution 125 milliGRAM(s) Oral daily    MEDICATIONS  (PRN):  acetaminophen    Suspension .. 650 milliGRAM(s) Enteral Tube every 6 hours PRN Temp greater or equal to 38C (100.4F), Mild Pain (1 - 3)      CAPILLARY BLOOD GLUCOSE      POCT Blood Glucose.: 137 mg/dL (22 Jan 2022 08:05)  POCT Blood Glucose.: 108 mg/dL (22 Jan 2022 05:50)  POCT Blood Glucose.: 136 mg/dL (22 Jan 2022 00:11)  POCT Blood Glucose.: 140 mg/dL (21 Jan 2022 18:23)  POCT Blood Glucose.: 138 mg/dL (21 Jan 2022 11:38)    I&O's Summary    21 Jan 2022 07:01  -  22 Jan 2022 07:00  --------------------------------------------------------  IN: 1650 mL / OUT: 1650 mL / NET: 0 mL        PHYSICAL EXAM:  Vital Signs Last 24 Hrs  T(C): 36.4 (22 Jan 2022 08:00), Max: 37.2 (21 Jan 2022 12:39)  T(F): 97.6 (22 Jan 2022 08:00), Max: 99 (21 Jan 2022 12:39)  HR: 86 (22 Jan 2022 08:00) (80 - 101)  BP: 135/77 (22 Jan 2022 08:00) (105/70 - 149/84)  BP(mean): --  RR: 18 (22 Jan 2022 08:00) (18 - 18)  SpO2: 97% (22 Jan 2022 08:00) (97% - 100%)    CONSTITUTIONAL: Chronically ill appearing male laying in bed in NAD  EYES: Opening eyes, clear sclera  NECK: +Trach  RESPIRATORY: Normal respiratory effort; rhonchi bilaterally  CARDIOVASCULAR: Regular rate and rhythm, normal S1 and S2, no murmur/rub/gallop; No lower extremity edema  ABDOMEN: Nontender to palpation, normoactive bowel sounds, no rebound/guarding, +PEG  MUSCULOSKELETAL: No clubbing or cyanosis of digits; no joint swelling or tenderness to palpation  PSYCH: Awake, alert, unable to assess orientation as patient nonverbal  NEUROLOGY: Able to make fist with both hands, infrequently able to follow simple commands  SKIN: No rashes; no palpable lesions    LABS:                        8.7    10.26 )-----------( 527      ( 22 Jan 2022 05:34 )             27.7     01-21    141  |  101  |  16  ----------------------------<  140<H>  4.0   |  28  |  0.60    Ca    9.2      21 Jan 2022 05:45  Mg     2.0     01-21    TPro  6.2  /  Alb  2.6<L>  /  TBili  0.2  /  DBili  x   /  AST  15  /  ALT  23  /  AlkPhos  98  01-21

## 2022-01-22 NOTE — PROGRESS NOTE ADULT - PROBLEM SELECTOR PLAN 2
Already completed course of PO vanco previously.  GI has a low suspicion for active C. Diff infection  - ID recommending continue PO vancomycin slow taper, 125 tid x 1 week, 125 BID x 1 week, 125 daily x 1 week, and 125 every other day x 2 weeks  - c/w Banatrol QID for loose stool, Cholestyramine(will decrease frequency) and c/w lomotil   - c/w lomotil to tid  - judicious use of abx therapy as may exacerbate c. diff

## 2022-01-22 NOTE — PROGRESS NOTE ADULT - PROBLEM SELECTOR PLAN 3
Hgb trend: 8.3 <-- , 8.2 <-- , 7.9 <-- , 6.9 <-- , 6.9 <-- , 8.0 <--   Hg stable today  no obvious source of bleeding.  there is presumed anemia of chronic disease given no obvious bleeding

## 2022-01-22 NOTE — PROGRESS NOTE ADULT - PROBLEM SELECTOR PLAN 7
Found to have Right soleal vein DVT and persistent left posterior tibial, peroneal, gastrocnemius and soleal vein DVT without proximal propagation  - c/w Eliquis 12/2, monitor H/H

## 2022-01-23 PROCEDURE — 99232 SBSQ HOSP IP/OBS MODERATE 35: CPT

## 2022-01-23 RX ADMIN — CHLORHEXIDINE GLUCONATE 15 MILLILITER(S): 213 SOLUTION TOPICAL at 17:09

## 2022-01-23 RX ADMIN — APIXABAN 5 MILLIGRAM(S): 2.5 TABLET, FILM COATED ORAL at 17:10

## 2022-01-23 RX ADMIN — CHOLESTYRAMINE 4 GRAM(S): 4 POWDER, FOR SUSPENSION ORAL at 09:13

## 2022-01-23 RX ADMIN — Medication 3 MILLILITER(S): at 13:03

## 2022-01-23 RX ADMIN — Medication 81 MILLIGRAM(S): at 13:05

## 2022-01-23 RX ADMIN — Medication 2: at 23:39

## 2022-01-23 RX ADMIN — Medication 125 MILLIGRAM(S): at 13:06

## 2022-01-23 RX ADMIN — Medication 4 MILLILITER(S): at 05:08

## 2022-01-23 RX ADMIN — SODIUM CHLORIDE 3 MILLILITER(S): 9 INJECTION INTRAMUSCULAR; INTRAVENOUS; SUBCUTANEOUS at 13:04

## 2022-01-23 RX ADMIN — Medication 2 MILLIGRAM(S): at 05:09

## 2022-01-23 RX ADMIN — CHOLESTYRAMINE 4 GRAM(S): 4 POWDER, FOR SUSPENSION ORAL at 20:39

## 2022-01-23 RX ADMIN — APIXABAN 5 MILLIGRAM(S): 2.5 TABLET, FILM COATED ORAL at 05:07

## 2022-01-23 RX ADMIN — Medication 1 PACKET(S): at 13:05

## 2022-01-23 RX ADMIN — Medication 2 TABLET(S): at 05:07

## 2022-01-23 RX ADMIN — Medication 1 DROP(S): at 23:39

## 2022-01-23 RX ADMIN — Medication 1 DROP(S): at 13:03

## 2022-01-23 RX ADMIN — Medication 3 MILLILITER(S): at 23:39

## 2022-01-23 RX ADMIN — Medication 4 MILLILITER(S): at 13:03

## 2022-01-23 RX ADMIN — Medication 3 MILLILITER(S): at 17:10

## 2022-01-23 RX ADMIN — Medication 4 MILLILITER(S): at 23:38

## 2022-01-23 RX ADMIN — Medication 650 MILLIGRAM(S): at 22:00

## 2022-01-23 RX ADMIN — ATORVASTATIN CALCIUM 40 MILLIGRAM(S): 80 TABLET, FILM COATED ORAL at 21:50

## 2022-01-23 RX ADMIN — Medication 12.5 MILLIGRAM(S): at 17:10

## 2022-01-23 RX ADMIN — HUMAN INSULIN 12 UNIT(S): 100 INJECTION, SUSPENSION SUBCUTANEOUS at 13:02

## 2022-01-23 RX ADMIN — Medication 12.5 MILLIGRAM(S): at 05:07

## 2022-01-23 RX ADMIN — CHLORHEXIDINE GLUCONATE 15 MILLILITER(S): 213 SOLUTION TOPICAL at 05:09

## 2022-01-23 RX ADMIN — SODIUM CHLORIDE 3 MILLILITER(S): 9 INJECTION INTRAMUSCULAR; INTRAVENOUS; SUBCUTANEOUS at 23:38

## 2022-01-23 RX ADMIN — SODIUM CHLORIDE 3 MILLILITER(S): 9 INJECTION INTRAMUSCULAR; INTRAVENOUS; SUBCUTANEOUS at 17:09

## 2022-01-23 RX ADMIN — SODIUM CHLORIDE 3 MILLILITER(S): 9 INJECTION INTRAMUSCULAR; INTRAVENOUS; SUBCUTANEOUS at 05:07

## 2022-01-23 RX ADMIN — PANTOPRAZOLE SODIUM 40 MILLIGRAM(S): 20 TABLET, DELAYED RELEASE ORAL at 13:05

## 2022-01-23 RX ADMIN — HUMAN INSULIN 12 UNIT(S): 100 INJECTION, SUSPENSION SUBCUTANEOUS at 06:21

## 2022-01-23 RX ADMIN — Medication 1 DROP(S): at 17:09

## 2022-01-23 RX ADMIN — HUMAN INSULIN 12 UNIT(S): 100 INJECTION, SUSPENSION SUBCUTANEOUS at 23:39

## 2022-01-23 RX ADMIN — Medication 1 DROP(S): at 05:08

## 2022-01-23 RX ADMIN — Medication 4 MILLILITER(S): at 17:09

## 2022-01-23 RX ADMIN — Medication 2 TABLET(S): at 21:50

## 2022-01-23 RX ADMIN — Medication 1 TABLET(S): at 13:04

## 2022-01-23 RX ADMIN — Medication 650 MILLIGRAM(S): at 23:00

## 2022-01-23 RX ADMIN — HUMAN INSULIN 12 UNIT(S): 100 INJECTION, SUSPENSION SUBCUTANEOUS at 17:31

## 2022-01-23 RX ADMIN — Medication 3 MILLILITER(S): at 05:08

## 2022-01-23 NOTE — PROGRESS NOTE ADULT - SUBJECTIVE AND OBJECTIVE BOX
[facility] Division of Hospital Medicine  Philippe Carr DO  Pager (KIMBERLY, 0D-4H): [phone]  Other Times:  [phone]    Patient is a 61y old  Male who presents with a chief complaint of diarrhea (22 Jan 2022 15:30)    SUBJECTIVE / OVERNIGHT EVENTS: No acute events overnight. Patient seen and examined at bedside this morning, unable to participate in review of systems due to clinical status.    ADDITIONAL REVIEW OF SYSTEMS: Patient unable to participate due to clinical status.    MEDICATIONS  (STANDING):  acetylcysteine 20%  Inhalation 4 milliLiter(s) Inhalation every 6 hours  albuterol/ipratropium for Nebulization 3 milliLiter(s) Nebulizer every 6 hours  apixaban 5 milliGRAM(s) Enteral Tube every 12 hours  artificial  tears Solution 1 Drop(s) Both EYES every 6 hours  aspirin  chewable 81 milliGRAM(s) Oral daily  atorvastatin 40 milliGRAM(s) Oral at bedtime  chlorhexidine 0.12% Liquid 15 milliLiter(s) Oral Mucosa two times a day  cholestyramine Powder (Sugar-Free) 4 Gram(s) Oral two times a day  dextrose 50% Injectable 25 Gram(s) IV Push once  dextrose 50% Injectable 12.5 Gram(s) IV Push once  diphenoxylate/atropine 2 Tablet(s) Oral three times a day  doxazosin 2 milliGRAM(s) Oral daily  insulin lispro (ADMELOG) corrective regimen sliding scale   SubCutaneous every 6 hours  insulin NPH human recombinant 12 Unit(s) SubCutaneous every 6 hours  metoprolol tartrate 12.5 milliGRAM(s) Oral two times a day  multivitamin 1 Tablet(s) Oral daily  pantoprazole  Injectable 40 milliGRAM(s) IV Push daily  psyllium Powder 1 Packet(s) Oral daily  sodium chloride 3%  Inhalation 3 milliLiter(s) Inhalation every 6 hours  vancomycin    Solution 125 milliGRAM(s) Oral daily    MEDICATIONS  (PRN):  acetaminophen    Suspension .. 650 milliGRAM(s) Enteral Tube every 6 hours PRN Temp greater or equal to 38C (100.4F), Mild Pain (1 - 3)      CAPILLARY BLOOD GLUCOSE      POCT Blood Glucose.: 118 mg/dL (23 Jan 2022 05:22)  POCT Blood Glucose.: 109 mg/dL (22 Jan 2022 23:43)  POCT Blood Glucose.: 162 mg/dL (22 Jan 2022 16:56)  POCT Blood Glucose.: 148 mg/dL (22 Jan 2022 11:54)    I&O's Summary    22 Jan 2022 07:01  -  23 Jan 2022 07:00  --------------------------------------------------------  IN: 0 mL / OUT: 1200 mL / NET: -1200 mL        PHYSICAL EXAM:  Vital Signs Last 24 Hrs  T(C): 37.5 (23 Jan 2022 08:00), Max: 37.5 (23 Jan 2022 08:00)  T(F): 99.5 (23 Jan 2022 08:00), Max: 99.5 (23 Jan 2022 08:00)  HR: 89 (23 Jan 2022 08:00) (89 - 103)  BP: 129/76 (23 Jan 2022 08:00) (119/74 - 131/71)  BP(mean): --  RR: 16 (23 Jan 2022 08:00) (16 - 18)  SpO2: 100% (23 Jan 2022 08:00) (98% - 100%)    CONSTITUTIONAL: Chronically ill appearing male laying in bed in NAD  EYES: Closed  NECK: +Trach  RESPIRATORY: Normal respiratory effort; rhonchi bilaterally  CARDIOVASCULAR: Regular rate and rhythm, normal S1 and S2, no murmur/rub/gallop; No lower extremity edema  ABDOMEN: Nontender to palpation, normoactive bowel sounds, no rebound/guarding, +PEG  MUSCULOSKELETAL: No clubbing or cyanosis of digits; no joint swelling or tenderness to palpation  PSYCH: Awake, not alert today, unable to assess orientation as patient nonverbal  NEUROLOGY: Not able to follow commands today  SKIN: No rashes; no palpable lesions    LABS:                        8.7    10.26 )-----------( 527      ( 22 Jan 2022 05:34 )             27.7

## 2022-01-23 NOTE — PROGRESS NOTE ADULT - SUBJECTIVE AND OBJECTIVE BOX
Patient is a 61y Male     Patient is a 61y old  Male who presents with a chief complaint of Posterior fossa hemorrhage (23 Jan 2022 10:09)      HPI:  61M hx HTN, DM, cardiac stent on ASA. At work complaining of HA ~8:30, starting feeling dizzy and vomiting, HTN/keke when EMS got to him. SBP 220s, HR 50s.     On EMS arrival at 09:39AM 11/4/21, patient seen talking a little, garbling, moving all extremities, then quickly became unresponsive. No known blood thinners.    CTH shows large posterior fossa bleed w/ IVH/SAH/hydro.    Exam:  Pre/intubation exam: no eyes open, pupils 2b/l, + corneals/cough/gag, not FC, LUE spont fingers moving, flacid otherwise    ICU Vital Signs Last 24 Hrs  T(C): 34.8 (04 Nov 2021 12:00), Max: 34.8 (04 Nov 2021 12:00)  T(F): 94.6 (04 Nov 2021 12:00), Max: 94.6 (04 Nov 2021 12:00)  HR: 127 (04 Nov 2021 11:30) (109 - 146)  BP: 123/78 (04 Nov 2021 11:30) (123/78 - 245/142)  BP(mean): --  ABP: --  ABP(mean): --  RR: 19 (04 Nov 2021 11:30) (19 - 30)  SpO2: 100% (04 Nov 2021 11:30) (100% - 100%)  11-04    140  |  103  |  18  ----------------------------<  226<H>  3.3<L>   |  19<L>  |  1.24    Ca    9.5      04 Nov 2021 10:16    TPro  7.8  /  Alb  4.8  /  TBili  0.4  /  DBili  x   /  AST  18  /  ALT  14  /  AlkPhos  94  11-04                        12.9   15.29 )-----------( 322      ( 04 Nov 2021 10:16 )             40.5    (04 Nov 2021 12:28)      PAST MEDICAL & SURGICAL HISTORY:  HTN (hypertension)    Diabetes mellitus        MEDICATIONS  (STANDING):  acetylcysteine 20%  Inhalation 4 milliLiter(s) Inhalation every 6 hours  albuterol/ipratropium for Nebulization 3 milliLiter(s) Nebulizer every 6 hours  apixaban 5 milliGRAM(s) Enteral Tube every 12 hours  artificial  tears Solution 1 Drop(s) Both EYES every 6 hours  aspirin  chewable 81 milliGRAM(s) Oral daily  atorvastatin 40 milliGRAM(s) Oral at bedtime  chlorhexidine 0.12% Liquid 15 milliLiter(s) Oral Mucosa two times a day  cholestyramine Powder (Sugar-Free) 4 Gram(s) Oral two times a day  dextrose 50% Injectable 25 Gram(s) IV Push once  dextrose 50% Injectable 12.5 Gram(s) IV Push once  diphenoxylate/atropine 2 Tablet(s) Oral three times a day  doxazosin 2 milliGRAM(s) Oral daily  insulin lispro (ADMELOG) corrective regimen sliding scale   SubCutaneous every 6 hours  insulin NPH human recombinant 12 Unit(s) SubCutaneous every 6 hours  metoprolol tartrate 12.5 milliGRAM(s) Oral two times a day  multivitamin 1 Tablet(s) Oral daily  pantoprazole  Injectable 40 milliGRAM(s) IV Push daily  psyllium Powder 1 Packet(s) Oral daily  sodium chloride 3%  Inhalation 3 milliLiter(s) Inhalation every 6 hours  vancomycin    Solution 125 milliGRAM(s) Oral daily      Allergies    penicillin (Other)    Intolerances        SOCIAL HISTORY:  Denies ETOh,Smoking,     FAMILY HISTORY:      REVIEW OF SYSTEMS:    CONSTITUTIONAL: No weakness, fevers or chills  EYES/ENT: No visual changes;  No vertigo or throat pain   NECK: No pain or stiffness  RESPIRATORY: No cough, wheezing, hemoptysis; No shortness of breath  CARDIOVASCULAR: No chest pain or palpitations  GASTROINTESTINAL: No abdominal or epigastric pain. No nausea, vomiting, or hematemesis; No diarrhea or constipation. No melena or hematochezia.  GENITOURINARY: No dysuria, frequency or hematuria  NEUROLOGICAL: No numbness or weakness  SKIN: No itching, burning, rashes, or lesions   All other review of systems is negative unless indicated above.    VITAL:  T(C): , Max: 37.5 (01-23-22 @ 08:00)  T(F): , Max: 99.5 (01-23-22 @ 08:00)  HR: 89 (01-23-22 @ 08:00)  BP: 129/76 (01-23-22 @ 08:00)  BP(mean): --  RR: 16 (01-23-22 @ 08:00)  SpO2: 100% (01-23-22 @ 08:00)  Wt(kg): --    I and O's:    01-21 @ 07:01  -  01-22 @ 07:00  --------------------------------------------------------  IN: 1650 mL / OUT: 1650 mL / NET: 0 mL    01-22 @ 07:01  -  01-23 @ 07:00  --------------------------------------------------------  IN: 0 mL / OUT: 1200 mL / NET: -1200 mL          PHYSICAL EXAM:    Constitutional: NAD  HEENT: PERRLA,   Neck: No JVD  Respiratory: CTA B/L  Cardiovascular: S1 and S2  Gastrointestinal: BS+, soft, NT/ND  Extremities: No peripheral edema  Neurological: A/O x 3, no focal deficits  Psychiatric: Normal mood, normal affect  : No Barney  Skin: No rashes  Access: Not applicable  Back: No CVA tenderness    LABS:                        8.7    10.26 )-----------( 527      ( 22 Jan 2022 05:34 )             27.7                 RADIOLOGY & ADDITIONAL STUDIES:

## 2022-01-23 NOTE — PROGRESS NOTE ADULT - PROBLEM SELECTOR PLAN 10
JUSTIN received MDO for advanced directives/general information. MDO for [facility] AT [facility] orders entered/F2F entered. Pt to [facility] today with [facility]. JUSTIN followed up with Willa/SHANON - she states that upon checking in again, pt is preferring outpatient therapy instead. HbA1c 6.5%.   continue NPH 12 units q6h.   monitor FS glucose.  FS acceptable

## 2022-01-23 NOTE — PROGRESS NOTE ADULT - PROBLEM SELECTOR PLAN 11
CT a/p with a 2.4 cm nodular soft tissue density in the bladder appears separate from the prostate gland, concerning for bladder lesion  - as per urology - findings concerning for bladder tumor vs prostate gland. cysto transurethral resection would require general anesthesia. recommended checking urine cytology to determine next steps which returned negative. will still need outpatient urology follow-up regardless once acute issues improve  - PSA normal, urine cytology negative for malignancy  - previously spoke to the patient's wife, Sandrita Bauer [phone]. Given the patient's underlying comorbid conditions coupled with his recent devastating neurologic events, other medical complications that ensued, and his poor neurologic and functional status at this time, it may be prudent to monitor his progress and see if he makes any meaningful recovery over the coming weeks to months to decide whether the benefit of pursuing any invasive work up for the bladder lesion outweighs the risk. Wife in agreement.  - Will require eventual outpatient f/u with Urology

## 2022-01-23 NOTE — PROGRESS NOTE ADULT - PROBLEM SELECTOR PLAN 3
Hgb trend: 8.3 <-- , 8.2 <-- , 7.9 <-- , 6.9 <-- , 6.9 <-- , 8.0 <--   Hg stable  no obvious source of bleeding.  there is presumed anemia of chronic disease given no obvious bleeding

## 2022-01-23 NOTE — PROGRESS NOTE ADULT - PROBLEM SELECTOR PLAN 5
- trach dislodged a couple of times requiring adjustment by ENT. CT chest with high riding trach tube. ENT follow up  - continue trach care and suctioning  - monitor O2 sats  - continue nebs. changed to xopenex if tachycardia persists but overall improved  - small pneumothorax appears to be stable- appreciate thoracic surgery input  - chest PT if able  - Pulm has been consulted, not a RCU candidate at this time

## 2022-01-24 LAB
GLUCOSE BLDC GLUCOMTR-MCNC: 102 MG/DL — HIGH (ref 70–99)
GLUCOSE BLDC GLUCOMTR-MCNC: 108 MG/DL — HIGH (ref 70–99)

## 2022-01-24 PROCEDURE — 99232 SBSQ HOSP IP/OBS MODERATE 35: CPT

## 2022-01-24 RX ADMIN — Medication 3 MILLILITER(S): at 11:46

## 2022-01-24 RX ADMIN — HUMAN INSULIN 12 UNIT(S): 100 INJECTION, SUSPENSION SUBCUTANEOUS at 16:51

## 2022-01-24 RX ADMIN — Medication 2 TABLET(S): at 23:27

## 2022-01-24 RX ADMIN — CHOLESTYRAMINE 4 GRAM(S): 4 POWDER, FOR SUSPENSION ORAL at 11:47

## 2022-01-24 RX ADMIN — Medication 4 MILLILITER(S): at 23:27

## 2022-01-24 RX ADMIN — SODIUM CHLORIDE 3 MILLILITER(S): 9 INJECTION INTRAMUSCULAR; INTRAVENOUS; SUBCUTANEOUS at 16:52

## 2022-01-24 RX ADMIN — Medication 1 DROP(S): at 23:27

## 2022-01-24 RX ADMIN — Medication 125 MILLIGRAM(S): at 11:48

## 2022-01-24 RX ADMIN — Medication 1 DROP(S): at 05:29

## 2022-01-24 RX ADMIN — ATORVASTATIN CALCIUM 40 MILLIGRAM(S): 80 TABLET, FILM COATED ORAL at 23:27

## 2022-01-24 RX ADMIN — Medication 1 DROP(S): at 11:45

## 2022-01-24 RX ADMIN — Medication 12.5 MILLIGRAM(S): at 05:28

## 2022-01-24 RX ADMIN — Medication 2 MILLIGRAM(S): at 05:28

## 2022-01-24 RX ADMIN — CHLORHEXIDINE GLUCONATE 15 MILLILITER(S): 213 SOLUTION TOPICAL at 05:33

## 2022-01-24 RX ADMIN — Medication 3 MILLILITER(S): at 23:27

## 2022-01-24 RX ADMIN — CHLORHEXIDINE GLUCONATE 15 MILLILITER(S): 213 SOLUTION TOPICAL at 16:50

## 2022-01-24 RX ADMIN — SODIUM CHLORIDE 3 MILLILITER(S): 9 INJECTION INTRAMUSCULAR; INTRAVENOUS; SUBCUTANEOUS at 23:26

## 2022-01-24 RX ADMIN — Medication 2 TABLET(S): at 05:28

## 2022-01-24 RX ADMIN — Medication 12.5 MILLIGRAM(S): at 16:50

## 2022-01-24 RX ADMIN — PANTOPRAZOLE SODIUM 40 MILLIGRAM(S): 20 TABLET, DELAYED RELEASE ORAL at 11:48

## 2022-01-24 RX ADMIN — Medication 3 MILLILITER(S): at 05:29

## 2022-01-24 RX ADMIN — APIXABAN 5 MILLIGRAM(S): 2.5 TABLET, FILM COATED ORAL at 16:50

## 2022-01-24 RX ADMIN — SODIUM CHLORIDE 3 MILLILITER(S): 9 INJECTION INTRAMUSCULAR; INTRAVENOUS; SUBCUTANEOUS at 11:49

## 2022-01-24 RX ADMIN — Medication 1 PACKET(S): at 11:48

## 2022-01-24 RX ADMIN — Medication 2 TABLET(S): at 12:15

## 2022-01-24 RX ADMIN — Medication 4 MILLILITER(S): at 05:29

## 2022-01-24 RX ADMIN — APIXABAN 5 MILLIGRAM(S): 2.5 TABLET, FILM COATED ORAL at 05:28

## 2022-01-24 RX ADMIN — SODIUM CHLORIDE 3 MILLILITER(S): 9 INJECTION INTRAMUSCULAR; INTRAVENOUS; SUBCUTANEOUS at 05:29

## 2022-01-24 RX ADMIN — HUMAN INSULIN 12 UNIT(S): 100 INJECTION, SUSPENSION SUBCUTANEOUS at 05:33

## 2022-01-24 RX ADMIN — Medication 1 DROP(S): at 16:50

## 2022-01-24 RX ADMIN — Medication 4 MILLILITER(S): at 11:45

## 2022-01-24 RX ADMIN — HUMAN INSULIN 12 UNIT(S): 100 INJECTION, SUSPENSION SUBCUTANEOUS at 23:36

## 2022-01-24 RX ADMIN — Medication 3 MILLILITER(S): at 16:49

## 2022-01-24 RX ADMIN — Medication 81 MILLIGRAM(S): at 12:16

## 2022-01-24 RX ADMIN — Medication 1 TABLET(S): at 11:47

## 2022-01-24 RX ADMIN — CHOLESTYRAMINE 4 GRAM(S): 4 POWDER, FOR SUSPENSION ORAL at 21:22

## 2022-01-24 RX ADMIN — HUMAN INSULIN 12 UNIT(S): 100 INJECTION, SUSPENSION SUBCUTANEOUS at 11:46

## 2022-01-24 RX ADMIN — Medication 4 MILLILITER(S): at 16:52

## 2022-01-24 NOTE — PROGRESS NOTE ADULT - SUBJECTIVE AND OBJECTIVE BOX
ALICIA [id number]  61yMale  T(C): 37.2 (01-24-22 @ 04:45), Max: 37.4 (01-23-22 @ 16:21)  HR: 105 (01-24-22 @ 04:45) (92 - 105)  BP: 126/76 (01-24-22 @ 04:45) (121/68 - 152/71)  RR: 18 (01-24-22 @ 04:45) (16 - 20)  SpO2: 98% (01-24-22 @ 04:45) (98% - 100%)  Wt(kg): --  01-23 @ 07:01  -  01-24 @ 07:00  --------------------------------------------------------  IN: 0 mL / OUT: 1350 mL / NET: -1350 mL      normal cephalic atraumatic  s1s2   clear to ascultation bilaterally  soft, non tender, non distended no guarding or rebound  no clubbing cyanosis or edema

## 2022-01-24 NOTE — PROGRESS NOTE ADULT - PROBLEM SELECTOR PLAN 7
Found to have Right soleal vein DVT and persistent left posterior tibial, peroneal, gastrocnemius and soleal vein DVT without proximal propagation  - c/w Eliquis 12/2, monitor H/H (stable)

## 2022-01-24 NOTE — PROGRESS NOTE ADULT - SUBJECTIVE AND OBJECTIVE BOX
Patient is a 61y old  Male who presents with a chief complaint of cdad (24 Jan 2022 08:26)      SUBJECTIVE / OVERNIGHT EVENTS: Patient seen and examined at bedside. He remains at baseline mental status. no acute complaints per nursing staff    ROS:  unable to participate in ROS due to clinical status     Allergies    penicillin (Other)    Intolerances        MEDICATIONS  (STANDING):  acetylcysteine 20%  Inhalation 4 milliLiter(s) Inhalation every 6 hours  albuterol/ipratropium for Nebulization 3 milliLiter(s) Nebulizer every 6 hours  apixaban 5 milliGRAM(s) Enteral Tube every 12 hours  artificial  tears Solution 1 Drop(s) Both EYES every 6 hours  aspirin  chewable 81 milliGRAM(s) Oral daily  atorvastatin 40 milliGRAM(s) Oral at bedtime  chlorhexidine 0.12% Liquid 15 milliLiter(s) Oral Mucosa two times a day  cholestyramine Powder (Sugar-Free) 4 Gram(s) Oral two times a day  dextrose 50% Injectable 25 Gram(s) IV Push once  dextrose 50% Injectable 12.5 Gram(s) IV Push once  diphenoxylate/atropine 2 Tablet(s) Oral three times a day  doxazosin 2 milliGRAM(s) Oral daily  insulin lispro (ADMELOG) corrective regimen sliding scale   SubCutaneous every 6 hours  insulin NPH human recombinant 12 Unit(s) SubCutaneous every 6 hours  metoprolol tartrate 12.5 milliGRAM(s) Oral two times a day  multivitamin 1 Tablet(s) Oral daily  pantoprazole  Injectable 40 milliGRAM(s) IV Push daily  psyllium Powder 1 Packet(s) Oral daily  sodium chloride 3%  Inhalation 3 milliLiter(s) Inhalation every 6 hours  vancomycin    Solution 125 milliGRAM(s) Oral daily    MEDICATIONS  (PRN):  acetaminophen    Suspension .. 650 milliGRAM(s) Enteral Tube every 6 hours PRN Temp greater or equal to 38C (100.4F), Mild Pain (1 - 3)      Vital Signs Last 24 Hrs  T(C): 37.6 (24 Jan 2022 09:32), Max: 37.6 (24 Jan 2022 09:32)  T(F): 99.6 (24 Jan 2022 09:32), Max: 99.6 (24 Jan 2022 09:32)  HR: 100 (24 Jan 2022 09:32) (100 - 105)  BP: 120/76 (24 Jan 2022 09:32) (120/76 - 152/71)  BP(mean): --  RR: 16 (24 Jan 2022 09:32) (16 - 20)  SpO2: 98% (24 Jan 2022 09:32) (98% - 100%)  CAPILLARY BLOOD GLUCOSE      POCT Blood Glucose.: 145 mg/dL (24 Jan 2022 11:37)  POCT Blood Glucose.: 108 mg/dL (24 Jan 2022 05:29)  POCT Blood Glucose.: 176 mg/dL (23 Jan 2022 23:37)  POCT Blood Glucose.: 140 mg/dL (23 Jan 2022 17:29)    I&O's Summary    23 Jan 2022 07:01  -  24 Jan 2022 07:00  --------------------------------------------------------  IN: 0 mL / OUT: 1350 mL / NET: -1350 mL        PHYSICAL EXAM:  GENERAL: chronically ill appearing   HEAD:  Atraumatic, Normocephalic  EYES: Opening eyes, clear sclera  NECK: +Trach  RESPIRATORY: Normal respiratory effort; rhonchi bilaterally in upper lung fields  CARDIOVASCULAR: Regular rate and rhythm, normal S1 and S2, no murmur/rub/gallop; No lower extremity edema  ABDOMEN: Nontender to palpation, normoactive bowel sounds, no rebound/guarding, +PEG, + rectal tube  MUSCULOSKELETAL: No clubbing or cyanosis of digits; no joint swelling or tenderness to palpation  PSYCH: Awake, alert, unable to assess orientation as patient nonverbal  NEUROLOGY: Able to make fist with both hands, infrequently able to follow simple commands  SKIN: + sacral ulcer    LABS:                    RADIOLOGY & ADDITIONAL TESTS:    Care Discussed with Consultants/Other Providers: Medicine ACP and CM

## 2022-01-24 NOTE — PROGRESS NOTE ADULT - PROBLEM SELECTOR PLAN 12
DVT ppx: on Eliquis for DVT  - DNR    Dispo: patient is medically stable for d/c to [facility] when bed is available, time spent 40 min.

## 2022-01-24 NOTE — PROGRESS NOTE ADULT - PROBLEM SELECTOR PLAN 3
Hg stable 8s  no obvious source of bleeding.  there is presumed anemia of chronic disease given no obvious bleeding

## 2022-01-24 NOTE — CHART NOTE - NSCHARTNOTEFT_GEN_A_CORE
Called by the nurse to report that patient noted with urinary retention. Patient bladder scan noted with 450ml retained urine. Patient s/p straight cath. Repeat bladder scan in 6 hours. Dr Oswald made aware

## 2022-01-24 NOTE — PROGRESS NOTE ADULT - PROBLEM SELECTOR PLAN 11
CT a/p with a 2.4 cm nodular soft tissue density in the bladder appears separate from the prostate gland, concerning for bladder lesion  - as per urology - findings concerning for bladder tumor vs prostate gland. cysto transurethral resection would require general anesthesia. recommended checking urine cytology to determine next steps which returned negative. will still need outpatient urology follow-up regardless once acute issues improve  - PSA normal, urine cytology negative for malignancy  - previously spoke to the patient's wife, Sandrita Bauer [phone]. Given the patient's underlying comorbid conditions coupled with his recent devastating neurologic events, other medical complications that ensued, and his poor neurologic and functional status at this time, it may be prudent to monitor his progress and see if he makes any meaningful recovery over the coming weeks to months to decide whether the benefit of pursuing any invasive work up for the bladder lesion outweighs the risk. Wife in agreement.  - Will require eventual outpatient f/u with Urology

## 2022-01-25 ENCOUNTER — TRANSCRIPTION ENCOUNTER (OUTPATIENT)
Age: 62
End: 2022-01-25

## 2022-01-25 LAB
ANION GAP SERPL CALC-SCNC: 10 MMOL/L — SIGNIFICANT CHANGE UP (ref 5–17)
APPEARANCE UR: CLEAR — SIGNIFICANT CHANGE UP
BACTERIA # UR AUTO: ABNORMAL
BILIRUB UR-MCNC: NEGATIVE — SIGNIFICANT CHANGE UP
BUN SERPL-MCNC: 18 MG/DL — SIGNIFICANT CHANGE UP (ref 7–23)
CALCIUM SERPL-MCNC: 8.9 MG/DL — SIGNIFICANT CHANGE UP (ref 8.4–10.5)
CHLORIDE SERPL-SCNC: 95 MMOL/L — LOW (ref 96–108)
CO2 SERPL-SCNC: 25 MMOL/L — SIGNIFICANT CHANGE UP (ref 22–31)
COLOR SPEC: SIGNIFICANT CHANGE UP
CREAT ?TM UR-MCNC: 50 MG/DL — SIGNIFICANT CHANGE UP
CREAT SERPL-MCNC: 0.53 MG/DL — SIGNIFICANT CHANGE UP (ref 0.5–1.3)
DIFF PNL FLD: ABNORMAL
EPI CELLS # UR: 0 /HPF — SIGNIFICANT CHANGE UP
GLUCOSE BLDC GLUCOMTR-MCNC: 111 MG/DL — HIGH (ref 70–99)
GLUCOSE BLDC GLUCOMTR-MCNC: 127 MG/DL — HIGH (ref 70–99)
GLUCOSE BLDC GLUCOMTR-MCNC: 132 MG/DL — HIGH (ref 70–99)
GLUCOSE BLDC GLUCOMTR-MCNC: 139 MG/DL — HIGH (ref 70–99)
GLUCOSE SERPL-MCNC: 133 MG/DL — HIGH (ref 70–99)
GLUCOSE UR QL: NEGATIVE — SIGNIFICANT CHANGE UP
HCT VFR BLD CALC: 23.2 % — LOW (ref 39–50)
HGB BLD-MCNC: 7.4 G/DL — LOW (ref 13–17)
HYALINE CASTS # UR AUTO: 6 /LPF — HIGH (ref 0–2)
KETONES UR-MCNC: NEGATIVE — SIGNIFICANT CHANGE UP
LEUKOCYTE ESTERASE UR-ACNC: ABNORMAL
MCHC RBC-ENTMCNC: 28.5 PG — SIGNIFICANT CHANGE UP (ref 27–34)
MCHC RBC-ENTMCNC: 31.9 GM/DL — LOW (ref 32–36)
MCV RBC AUTO: 89.2 FL — SIGNIFICANT CHANGE UP (ref 80–100)
NITRITE UR-MCNC: POSITIVE
NRBC # BLD: 0 /100 WBCS — SIGNIFICANT CHANGE UP (ref 0–0)
OSMOLALITY UR: 400 MOS/KG — SIGNIFICANT CHANGE UP (ref 300–900)
PH UR: 5.5 — SIGNIFICANT CHANGE UP (ref 5–8)
PLATELET # BLD AUTO: 407 K/UL — HIGH (ref 150–400)
POTASSIUM SERPL-MCNC: 4.7 MMOL/L — SIGNIFICANT CHANGE UP (ref 3.5–5.3)
POTASSIUM SERPL-SCNC: 4.7 MMOL/L — SIGNIFICANT CHANGE UP (ref 3.5–5.3)
PROT UR-MCNC: ABNORMAL
RAPID RVP RESULT: SIGNIFICANT CHANGE UP
RBC # BLD: 2.6 M/UL — LOW (ref 4.2–5.8)
RBC # FLD: 13.6 % — SIGNIFICANT CHANGE UP (ref 10.3–14.5)
RBC CASTS # UR COMP ASSIST: 17 /HPF — HIGH (ref 0–4)
SARS-COV-2 RNA SPEC QL NAA+PROBE: SIGNIFICANT CHANGE UP
SODIUM SERPL-SCNC: 130 MMOL/L — LOW (ref 135–145)
SODIUM UR-SCNC: 20 MMOL/L — SIGNIFICANT CHANGE UP
SP GR SPEC: 1.01 — SIGNIFICANT CHANGE UP (ref 1.01–1.02)
UROBILINOGEN FLD QL: NEGATIVE — SIGNIFICANT CHANGE UP
WBC # BLD: 10.12 K/UL — SIGNIFICANT CHANGE UP (ref 3.8–10.5)
WBC # FLD AUTO: 10.12 K/UL — SIGNIFICANT CHANGE UP (ref 3.8–10.5)
WBC UR QL: 52 /HPF — HIGH (ref 0–5)

## 2022-01-25 PROCEDURE — 99233 SBSQ HOSP IP/OBS HIGH 50: CPT

## 2022-01-25 RX ORDER — SODIUM CHLORIDE 9 MG/ML
500 INJECTION INTRAMUSCULAR; INTRAVENOUS; SUBCUTANEOUS ONCE
Refills: 0 | Status: COMPLETED | OUTPATIENT
Start: 2022-01-25 | End: 2022-01-25

## 2022-01-25 RX ADMIN — Medication 1 DROP(S): at 23:28

## 2022-01-25 RX ADMIN — Medication 1 DROP(S): at 12:40

## 2022-01-25 RX ADMIN — Medication 4 MILLILITER(S): at 12:39

## 2022-01-25 RX ADMIN — SODIUM CHLORIDE 3 MILLILITER(S): 9 INJECTION INTRAMUSCULAR; INTRAVENOUS; SUBCUTANEOUS at 05:02

## 2022-01-25 RX ADMIN — Medication 2 TABLET(S): at 12:41

## 2022-01-25 RX ADMIN — Medication 4 MILLILITER(S): at 23:28

## 2022-01-25 RX ADMIN — Medication 3 MILLILITER(S): at 17:49

## 2022-01-25 RX ADMIN — Medication 81 MILLIGRAM(S): at 12:39

## 2022-01-25 RX ADMIN — Medication 2 TABLET(S): at 22:48

## 2022-01-25 RX ADMIN — Medication 650 MILLIGRAM(S): at 13:10

## 2022-01-25 RX ADMIN — HUMAN INSULIN 12 UNIT(S): 100 INJECTION, SUSPENSION SUBCUTANEOUS at 23:37

## 2022-01-25 RX ADMIN — APIXABAN 5 MILLIGRAM(S): 2.5 TABLET, FILM COATED ORAL at 05:03

## 2022-01-25 RX ADMIN — Medication 12.5 MILLIGRAM(S): at 05:43

## 2022-01-25 RX ADMIN — CHLORHEXIDINE GLUCONATE 15 MILLILITER(S): 213 SOLUTION TOPICAL at 17:50

## 2022-01-25 RX ADMIN — HUMAN INSULIN 12 UNIT(S): 100 INJECTION, SUSPENSION SUBCUTANEOUS at 06:03

## 2022-01-25 RX ADMIN — Medication 1 PACKET(S): at 17:51

## 2022-01-25 RX ADMIN — Medication 125 MILLIGRAM(S): at 12:38

## 2022-01-25 RX ADMIN — Medication 1 DROP(S): at 17:51

## 2022-01-25 RX ADMIN — ATORVASTATIN CALCIUM 40 MILLIGRAM(S): 80 TABLET, FILM COATED ORAL at 22:48

## 2022-01-25 RX ADMIN — SODIUM CHLORIDE 3 MILLILITER(S): 9 INJECTION INTRAMUSCULAR; INTRAVENOUS; SUBCUTANEOUS at 17:50

## 2022-01-25 RX ADMIN — Medication 650 MILLIGRAM(S): at 12:38

## 2022-01-25 RX ADMIN — SODIUM CHLORIDE 3 MILLILITER(S): 9 INJECTION INTRAMUSCULAR; INTRAVENOUS; SUBCUTANEOUS at 23:29

## 2022-01-25 RX ADMIN — Medication 3 MILLILITER(S): at 11:04

## 2022-01-25 RX ADMIN — Medication 3 MILLILITER(S): at 23:28

## 2022-01-25 RX ADMIN — Medication 1 DROP(S): at 05:03

## 2022-01-25 RX ADMIN — SODIUM CHLORIDE 3 MILLILITER(S): 9 INJECTION INTRAMUSCULAR; INTRAVENOUS; SUBCUTANEOUS at 11:04

## 2022-01-25 RX ADMIN — PANTOPRAZOLE SODIUM 40 MILLIGRAM(S): 20 TABLET, DELAYED RELEASE ORAL at 12:39

## 2022-01-25 RX ADMIN — SODIUM CHLORIDE 166.67 MILLILITER(S): 9 INJECTION INTRAMUSCULAR; INTRAVENOUS; SUBCUTANEOUS at 12:40

## 2022-01-25 RX ADMIN — CHLORHEXIDINE GLUCONATE 15 MILLILITER(S): 213 SOLUTION TOPICAL at 05:03

## 2022-01-25 RX ADMIN — HUMAN INSULIN 12 UNIT(S): 100 INJECTION, SUSPENSION SUBCUTANEOUS at 17:49

## 2022-01-25 RX ADMIN — Medication 1 TABLET(S): at 12:39

## 2022-01-25 RX ADMIN — Medication 2 TABLET(S): at 05:03

## 2022-01-25 RX ADMIN — APIXABAN 5 MILLIGRAM(S): 2.5 TABLET, FILM COATED ORAL at 17:51

## 2022-01-25 RX ADMIN — Medication 4 MILLILITER(S): at 17:50

## 2022-01-25 RX ADMIN — Medication 4 MILLILITER(S): at 05:02

## 2022-01-25 RX ADMIN — Medication 2 MILLIGRAM(S): at 05:03

## 2022-01-25 RX ADMIN — Medication 3 MILLILITER(S): at 05:01

## 2022-01-25 RX ADMIN — HUMAN INSULIN 12 UNIT(S): 100 INJECTION, SUSPENSION SUBCUTANEOUS at 12:40

## 2022-01-25 RX ADMIN — Medication 12.5 MILLIGRAM(S): at 17:52

## 2022-01-25 RX ADMIN — CHOLESTYRAMINE 4 GRAM(S): 4 POWDER, FOR SUSPENSION ORAL at 08:15

## 2022-01-25 NOTE — PROGRESS NOTE ADULT - SUBJECTIVE AND OBJECTIVE BOX
CC: f/u for low grade fever    Patient reports: he is non verbal.He is on TC, fed via peg, has been slow vanco taper and receiving imodium which has helped.RN reports that his stools are formed and he is stable on TC tolerating feeds.  He is receiving supportive care, has been considered for discharge, and has had some low grade temps in the high 99 range.    REVIEW OF SYSTEMS:  All other review of systems negative (Comprehensive ROS): limited by condition    Antimicrobials Day #  :taper  vancomycin    Solution 125 milliGRAM(s) Oral daily    Other Medications Reviewed    T(F): 97.9 (22 @ 16:01), Max: 99.9 (22 @ 17:08)  HR: 92 (22 @ 16:01)  BP: 117/70 (22 @ 16:01)  RR: 18 (22 @ 16:01)  SpO2: 97% (22 @ 16:01)  Wt(kg): --    PHYSICAL EXAM:  General: awake  no acute distress  Eyes:  anicteric, no conjunctival injection, no discharge  Oropharynx: no lesions or injection 	  Neck: supple, trach   Lungs: clear to auscultation  Heart: regular rate and rhythm; no murmur, rubs or gallops  Abdomen: soft, nondistended, nontender, without mass or organomegaly, peg in place  Skin: no lesions  Extremities: no clubbing, cyanosis, or edema  Neurologic: poorly interactive\  : teixeira    LAB RESULTS:                        7.4    10.12 )-----------( 407      ( 2022 11:07 )             23.2     -    130<L>  |  95<L>  |  18  ----------------------------<  133<H>  4.7   |  25  |  0.53    Ca    8.9      2022 10:41        Urinalysis Basic - ( 2022 11:08 )    Color: Light Yellow / Appearance: Clear / S.014 / pH: x  Gluc: x / Ketone: Negative  / Bili: Negative / Urobili: Negative   Blood: x / Protein: Trace / Nitrite: Positive   Leuk Esterase: Large / RBC: 17 /hpf / WBC 52 /HPF   Sq Epi: x / Non Sq Epi: 0 /hpf / Bacteria: Moderate      MICROBIOLOGY:  RECENT CULTURES:      RADIOLOGY REVIEWED:  < from: Xray Chest 1 View- PORTABLE-Urgent (Xray Chest 1 View- PORTABLE-Urgent .) (22 @ 18:25) >  IMPRESSION:  Linear density at the lung bases may represent atelectasis. It is   improved from the prior study.    --- End of Report ---    < end of copied text >

## 2022-01-25 NOTE — PROGRESS NOTE ADULT - PROBLEM SELECTOR PLAN 11
CT a/p with a 2.4 cm nodular soft tissue density in the bladder appears separate from the prostate gland, concerning for bladder lesion  - as per urology - findings concerning for bladder tumor vs prostate gland. cysto transurethral resection would require general anesthesia. recommended checking urine cytology to determine next steps which returned negative. will still need outpatient urology follow-up regardless once acute issues improve  - PSA normal, urine cytology negative for malignancy  - previously spoke to the patient's wife, Sandrita Bauer [phone]. Given the patient's underlying comorbid conditions coupled with his recent devastating neurologic events, other medical complications that ensued, and his poor neurologic and functional status at this time, it may be prudent to monitor his progress and see if he makes any meaningful recovery over the coming weeks to months to decide whether the benefit of pursuing any invasive work up for the bladder lesion outweighs the risk. Wife in agreement.  - Will require eventual outpatient f/u with Urology

## 2022-01-25 NOTE — PROGRESS NOTE ADULT - PROBLEM SELECTOR PLAN 1
Resolved  - patient with copious secretions, episode of hypoxia improved after frequent suctioning  - completed IV Cefepime and flagyl for possible aspiration pneumonia 5/5 days on 1/8  - follow up blood cultures NGTD  - aspiration precaution  - on Vanco taper via PEG for c diff  * noted to have temp 99x Tmax 99.9 over night, RN to check rectally, UA+, check urine cx, ID Dr. Valencia reconsulted f/u rec today, hold off on abx for now

## 2022-01-25 NOTE — PROGRESS NOTE ADULT - PROBLEM/PLAN-11
DISPLAY PLAN FREE TEXT
Strong peripheral pulses/Capillary refill less/equal to 2 seconds
DISPLAY PLAN FREE TEXT

## 2022-01-25 NOTE — PROGRESS NOTE ADULT - SUBJECTIVE AND OBJECTIVE BOX
Patient is a 61y old  Male who presents with a chief complaint of diarrhea (2022 07:13)      SUBJECTIVE / OVERNIGHT EVENTS: Patient seen and examined at bedside. He remains at baseline mental status, in the last 24 hr temp 99s Tmax 99.9 checked axillary per nursing staff. No acute events overnight     ROS:  All other review of systems negative    Allergies    penicillin (Other)    Intolerances        MEDICATIONS  (STANDING):  acetylcysteine 20%  Inhalation 4 milliLiter(s) Inhalation every 6 hours  albuterol/ipratropium for Nebulization 3 milliLiter(s) Nebulizer every 6 hours  apixaban 5 milliGRAM(s) Enteral Tube every 12 hours  artificial  tears Solution 1 Drop(s) Both EYES every 6 hours  aspirin  chewable 81 milliGRAM(s) Oral daily  atorvastatin 40 milliGRAM(s) Oral at bedtime  chlorhexidine 0.12% Liquid 15 milliLiter(s) Oral Mucosa two times a day  dextrose 50% Injectable 25 Gram(s) IV Push once  dextrose 50% Injectable 12.5 Gram(s) IV Push once  diphenoxylate/atropine 2 Tablet(s) Oral three times a day  doxazosin 2 milliGRAM(s) Oral daily  insulin lispro (ADMELOG) corrective regimen sliding scale   SubCutaneous every 6 hours  insulin NPH human recombinant 12 Unit(s) SubCutaneous every 6 hours  metoprolol tartrate 12.5 milliGRAM(s) Oral two times a day  multivitamin 1 Tablet(s) Oral daily  pantoprazole  Injectable 40 milliGRAM(s) IV Push daily  psyllium Powder 1 Packet(s) Oral daily  sodium chloride 0.9% Bolus 500 milliLiter(s) IV Bolus once  sodium chloride 3%  Inhalation 3 milliLiter(s) Inhalation every 6 hours  vancomycin    Solution 125 milliGRAM(s) Oral daily    MEDICATIONS  (PRN):  acetaminophen    Suspension .. 650 milliGRAM(s) Enteral Tube every 6 hours PRN Temp greater or equal to 38C (100.4F), Mild Pain (1 - 3)      Vital Signs Last 24 Hrs  T(C): 37.7 (2022 08:26), Max: 37.7 (2022 17:08)  T(F): 99.8 (2022 08:26), Max: 99.9 (2022 17:08)  HR: 98 (2022 08:26) (97 - 104)  BP: 128/81 (2022 08:26) (111/75 - 128/81)  BP(mean): --  RR: 18 (2022 08:26) (18 - 20)  SpO2: 99% (2022 08:26) (97% - 100%)  CAPILLARY BLOOD GLUCOSE      POCT Blood Glucose.: 139 mg/dL (2022 05:11)  POCT Blood Glucose.: 102 mg/dL (2022 23:34)  POCT Blood Glucose.: 108 mg/dL (2022 17:33)  POCT Blood Glucose.: 125 mg/dL (2022 16:20)    I&O's Summary    2022 07:01  -  2022 07:00  --------------------------------------------------------  IN: 1000 mL / OUT: 1051 mL / NET: -51 mL        PHYSICAL EXAM:  GENERAL: chronically ill appearing   HEAD:  Atraumatic, Normocephalic  EYES: Opening eyes, clear sclera  NECK: +Trach  RESPIRATORY: Normal respiratory effort; rhonchi bilaterally in upper lung fields  CARDIOVASCULAR: Regular rate and rhythm, normal S1 and S2, no murmur/rub/gallop; No lower extremity edema  ABDOMEN: Nontender to palpation, normoactive bowel sounds, no rebound/guarding, +PEG, + rectal tube  MUSCULOSKELETAL: No clubbing or cyanosis of digits; no joint swelling or tenderness to palpation  PSYCH: Awake, alert, unable to assess orientation as patient nonverbal  NEUROLOGY: Able to make fist with both hands, infrequently able to follow simple commands  SKIN: + sacral ulcer    LABS:                        7.4    10.12 )-----------( 407      ( 2022 11:07 )             23.2     01-25    130<L>  |  95<L>  |  18  ----------------------------<  133<H>  4.7   |  25  |  0.53    Ca    8.9      2022 10:41            Urinalysis Basic - ( 2022 11:08 )    Color: Light Yellow / Appearance: Clear / S.014 / pH: x  Gluc: x / Ketone: Negative  / Bili: Negative / Urobili: Negative   Blood: x / Protein: Trace / Nitrite: Positive   Leuk Esterase: Large / RBC: 17 /hpf / WBC 52 /HPF   Sq Epi: x / Non Sq Epi: 0 /hpf / Bacteria: Moderate        RADIOLOGY & ADDITIONAL TESTS:    Consultant(s) Notes Reviewed:  CM rec noted     Care Discussed with Consultants/Other Providers: ID     Case Discussed with wife over the phone

## 2022-01-25 NOTE — PROGRESS NOTE ADULT - SUBJECTIVE AND OBJECTIVE BOX
Patient is a 61y Male     Patient is a 61y old  Male who presents with a chief complaint of cdad (24 Jan 2022 08:26)      HPI:  61M hx HTN, DM, cardiac stent on ASA. At work complaining of HA ~8:30, starting feeling dizzy and vomiting, HTN/keke when EMS got to him. SBP 220s, HR 50s.     On EMS arrival at 09:39AM 11/4/21, patient seen talking a little, garbling, moving all extremities, then quickly became unresponsive. No known blood thinners.    CTH shows large posterior fossa bleed w/ IVH/SAH/hydro.    Exam:  Pre/intubation exam: no eyes open, pupils 2b/l, + corneals/cough/gag, not FC, LUE spont fingers moving, flacid otherwise    ICU Vital Signs Last 24 Hrs  T(C): 34.8 (04 Nov 2021 12:00), Max: 34.8 (04 Nov 2021 12:00)  T(F): 94.6 (04 Nov 2021 12:00), Max: 94.6 (04 Nov 2021 12:00)  HR: 127 (04 Nov 2021 11:30) (109 - 146)  BP: 123/78 (04 Nov 2021 11:30) (123/78 - 245/142)  BP(mean): --  ABP: --  ABP(mean): --  RR: 19 (04 Nov 2021 11:30) (19 - 30)  SpO2: 100% (04 Nov 2021 11:30) (100% - 100%)  11-04    140  |  103  |  18  ----------------------------<  226<H>  3.3<L>   |  19<L>  |  1.24    Ca    9.5      04 Nov 2021 10:16    TPro  7.8  /  Alb  4.8  /  TBili  0.4  /  DBili  x   /  AST  18  /  ALT  14  /  AlkPhos  94  11-04                        12.9   15.29 )-----------( 322      ( 04 Nov 2021 10:16 )             40.5    (04 Nov 2021 12:28)      PAST MEDICAL & SURGICAL HISTORY:  HTN (hypertension)    Diabetes mellitus        MEDICATIONS  (STANDING):  acetylcysteine 20%  Inhalation 4 milliLiter(s) Inhalation every 6 hours  albuterol/ipratropium for Nebulization 3 milliLiter(s) Nebulizer every 6 hours  apixaban 5 milliGRAM(s) Enteral Tube every 12 hours  artificial  tears Solution 1 Drop(s) Both EYES every 6 hours  aspirin  chewable 81 milliGRAM(s) Oral daily  atorvastatin 40 milliGRAM(s) Oral at bedtime  chlorhexidine 0.12% Liquid 15 milliLiter(s) Oral Mucosa two times a day  cholestyramine Powder (Sugar-Free) 4 Gram(s) Oral two times a day  dextrose 50% Injectable 25 Gram(s) IV Push once  dextrose 50% Injectable 12.5 Gram(s) IV Push once  diphenoxylate/atropine 2 Tablet(s) Oral three times a day  doxazosin 2 milliGRAM(s) Oral daily  insulin lispro (ADMELOG) corrective regimen sliding scale   SubCutaneous every 6 hours  insulin NPH human recombinant 12 Unit(s) SubCutaneous every 6 hours  metoprolol tartrate 12.5 milliGRAM(s) Oral two times a day  multivitamin 1 Tablet(s) Oral daily  pantoprazole  Injectable 40 milliGRAM(s) IV Push daily  psyllium Powder 1 Packet(s) Oral daily  sodium chloride 3%  Inhalation 3 milliLiter(s) Inhalation every 6 hours  vancomycin    Solution 125 milliGRAM(s) Oral daily      Allergies    penicillin (Other)    Intolerances        SOCIAL HISTORY:  Denies ETOh,Smoking,     FAMILY HISTORY:      REVIEW OF SYSTEMS:    CONSTITUTIONAL: No weakness, fevers or chills  EYES/ENT: No visual changes;  No vertigo or throat pain   NECK: No pain or stiffness  RESPIRATORY: No cough, wheezing, hemoptysis; No shortness of breath  CARDIOVASCULAR: No chest pain or palpitations  GASTROINTESTINAL: No abdominal or epigastric pain. No nausea, vomiting, or hematemesis; No diarrhea or constipation. No melena or hematochezia.  GENITOURINARY: No dysuria, frequency or hematuria  NEUROLOGICAL: No numbness or weakness  SKIN: No itching, burning, rashes, or lesions   All other review of systems is negative unless indicated above.    VITAL:  T(C): , Max: 37.7 (01-24-22 @ 17:08)  T(F): , Max: 99.9 (01-24-22 @ 17:08)  HR: 104 (01-25-22 @ 05:04)  BP: 117/71 (01-25-22 @ 05:04)  BP(mean): --  RR: 18 (01-25-22 @ 05:04)  SpO2: 99% (01-25-22 @ 05:04)  Wt(kg): --    I and O's:    01-23 @ 07:01  -  01-24 @ 07:00  --------------------------------------------------------  IN: 0 mL / OUT: 1350 mL / NET: -1350 mL    01-24 @ 07:01  -  01-25 @ 07:00  --------------------------------------------------------  IN: 1000 mL / OUT: 1051 mL / NET: -51 mL          PHYSICAL EXAM:    Constitutional: NAD  HEENT: PERRLA,   Neck: No JVD  Respiratory: CTA B/L  Cardiovascular: S1 and S2  Gastrointestinal: BS+, soft, NT/ND  Extremities: No peripheral edema  Neurological: A/O x 3, no focal deficits  Psychiatric: Normal mood, normal affect  : No Barney  Skin: No rashes  Access: Not applicable  Back: No CVA tenderness    LABS:                RADIOLOGY & ADDITIONAL STUDIES:

## 2022-01-25 NOTE — PROVIDER CONTACT NOTE (OTHER) - DATE AND TIME:
04-Jan-2022 06:05
18-Jan-2022 18:00
24-Jan-2022 18:23
25-Jan-2022 12:50
30-Nov-2021 00:32
03-Dec-2021 09:45
12-Nov-2021 13:45
15-Nima-2022 00:50
26-Nov-2021 01:00
07-Nov-2021 01:00
09-Jan-2022 14:00
31-Dec-2021 05:30
31-Dec-2021 04:00
19-Nov-2021 08:00
21-Dec-2021 22:10
31-Dec-2021 07:28
25-Dec-2021 04:47

## 2022-01-25 NOTE — DISCHARGE NOTE PROVIDER - CARE PROVIDER_API CALL
Salbador Noel)  Internal Medicine  [address] [place] [address]  Phone: [phone]  Fax: [phone]  Follow Up Time:

## 2022-01-25 NOTE — CHART NOTE - NSCHARTNOTEFT_GEN_A_CORE
Nutrition Follow Up Note  Patient seen for:    Chart reviewed, events noted. "61M hx HTN, DM, cardiac stent on ASA. At work complaining of HA ~8:30, starting feeling dizzy and vomiting, HTN/keke when EMS got to him. SBP 220s, HR 50s."       Source:     [x] EMR        [x] RN           -If unable to interview patient: [] Trach/Vent/BiPAP  [x] Disoriented/confused/inappropriate to interview    Diet Order:   Diet, NPO with Tube Feed:   Tube Feeding Modality: Gastrostomy  Glucerna 1.2 Blake (GLUCERNARTH)  Total Volume for 24 Hours (mL): 1800  Continuous  Until Goal Tube Feed Rate (mL per Hour): 75  Tube Feed Duration (in Hours): 24  Tube Feed Start Time: 12:00  Free Water Flush  Bolus   Total Volume per Flush (mL): 250   Frequency: Every 6 Hours  Charly(7 Gm Arginine/7 Gm Glut/1.2 Gm HMB     Qty per Day:  2  Banatrol TF     Qty per Day:  4  Supplement Feeding Modality:  Gastrostomy  Probiotic Yogurt/Smoothie Cans or Servings Per Day:  2       Frequency:  Daily (01-16-22)    - Is current order appropriate/adequate? [] Yes  []  No:   Feeds of Glucerna 1.2  - PO intake :   [x] >75%  Adequate    [] 50-75%  Fair       [] <50%  Poor    - Nutrition-related concerns:    GI:  Last BM ___.   Bowel Regimen? [] Yes   [] No      Weights:   Daily     Nutritionally Pertinent MEDICATIONS  (STANDING):  atorvastatin  dextrose 50% Injectable  dextrose 50% Injectable  diphenoxylate/atropine  doxazosin  insulin lispro (ADMELOG) corrective regimen sliding scale  insulin NPH human recombinant  metoprolol tartrate  multivitamin  pantoprazole  Injectable  psyllium Powder  vancomycin    Solution    Pertinent Labs: 01-25 @ 10:41: Na 130<L>, BUN 18, Cr 0.53, <H>, K+ 4.7, Phos --, Mg --, Alk Phos --, ALT/SGPT --, AST/SGOT --, HbA1c --    A1C with Estimated Average Glucose Result: 6.5 % (11-04-21 @ 21:43)    Finger Sticks:  POCT Blood Glucose.: 132 mg/dL (01-25 @ 12:13)  POCT Blood Glucose.: 139 mg/dL (01-25 @ 05:11)  POCT Blood Glucose.: 102 mg/dL (01-24 @ 23:34)  POCT Blood Glucose.: 108 mg/dL (01-24 @ 17:33)  POCT Blood Glucose.: 125 mg/dL (01-24 @ 16:20)      Skin per nursing documentation:   Edema:     Estimated Needs:   [] no change since previous assessment  [] recalculated:     Previous Nutrition Diagnosis:   Nutrition Diagnosis is: [] ongoing  [] resolved [] not applicable     New Nutrition Diagnosis: [] Not applicable    Nutrition Care Plan:  [] In Progress  [] Achieved  [] Not applicable    Nutrition Interventions:     Education Provided:       [] Yes:  [] No:        Recommendations:         [] Continue current diet order            [] Add oral nutrition supplement:     [] Discontinue current diet order. Recommend:      [] Add micronutrient supplementation:      [] Continue current micronutrient supplementation:      [] Other:     Monitoring and Evaluation:   Continue to monitor nutritional intake, tolerance to diet prescription, weights, labs, skin integrity      RD remains available upon request and will follow up per protocol Nutrition Follow Up Note  Patient seen for:    Chart reviewed, events noted. "Pt is a 61M with PMH of HTN, CAD s/p stent on DAPT, T2DM who complained of headache and subsequently became unresponsive found to have posterior fossa hemorrhage, IVH, hydrocephalus, s/p EVD and SOC on 11/4. 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 Course c/b respiratory failure and dysphagia s/p trach/PEG (11/19), VPS (11/23), Certas @4 completed course of cefepime for enterobacter and pseudomonas pneumonia on 11/21, fevers, c. diff colitis, urinary retention, b/l distal DVT. Trach changed 12/15 to #7 cuffless due to dislodgement. 12/16 CT A/P wo concern for infectious process. but concern for bladder lesion, wife will pursue possible treatment after rehab. Now with another bout of C.Diff found after fever workup, on PO vanco per ID, blood cultures negative to date. +Rectal tube"      Source:     [x] EMR        [x] RN           -If unable to interview patient: [] Trach/Vent/BiPAP  [x] Disoriented/confused/inappropriate to interview    Diet Order:   Diet, NPO with Tube Feed:   Tube Feeding Modality: Gastrostomy  Glucerna 1.2 Blake (GLUCERNARTH)  Total Volume for 24 Hours (mL): 1800  Continuous  Until Goal Tube Feed Rate (mL per Hour): 75  Tube Feed Duration (in Hours): 24  Tube Feed Start Time: 12:00  Free Water Flush  Bolus   Total Volume per Flush (mL): 250   Frequency: Every 6 Hours  Charly(7 Gm Arginine/7 Gm Glut/1.2 Gm HMB     Qty per Day:  2  Banatrol TF     Qty per Day:  4  Supplement Feeding Modality:  Gastrostomy  Probiotic Yogurt/Smoothie Cans or Servings Per Day:  2       Frequency:  Daily (01-16-22)    - Is current order appropriate/adequate? [] Yes  []  No:   Feeds of Glucerna 1.2 at 75 ml x 24hr, provides 2160 kcal, 108 g/protein, 1739 free water  - PO intake :   [x] >75%  Adequate    [] 50-75%  Fair       [] <50%  Poor    - Nutrition-related concerns:  Spoke with RN, unable to interview due to AMS. Per nurse, Pt tolerating at goal rate of 75ml. Nurse reports, Pt to d/c C-diff.    GI:  Last BM 1/25/22.   Bowel Regimen? [] Yes   [] No      Weights:   Daily     Nutritionally Pertinent MEDICATIONS  (STANDING):  atorvastatin  dextrose 50% Injectable  dextrose 50% Injectable  diphenoxylate/atropine  doxazosin  insulin lispro (ADMELOG) corrective regimen sliding scale  insulin NPH human recombinant  metoprolol tartrate  multivitamin  pantoprazole  Injectable  psyllium Powder  vancomycin    Solution    Pertinent Labs: 01-25 @ 10:41: Na 130<L>, BUN 18, Cr 0.53, <H>, K+ 4.7, Phos --, Mg --, Alk Phos --, ALT/SGPT --, AST/SGOT --, HbA1c --    A1C with Estimated Average Glucose Result: 6.5 % (11-04-21 @ 21:43)    Finger Sticks:  POCT Blood Glucose.: 132 mg/dL (01-25 @ 12:13)  POCT Blood Glucose.: 139 mg/dL (01-25 @ 05:11)  POCT Blood Glucose.: 102 mg/dL (01-24 @ 23:34)  POCT Blood Glucose.: 108 mg/dL (01-24 @ 17:33)  POCT Blood Glucose.: 125 mg/dL (01-24 @ 16:20)      Skin per nursing documentation:   Edema:     Estimated Needs:   [] no change since previous assessment  [] recalculated:     Previous Nutrition Diagnosis:   Nutrition Diagnosis is: [] ongoing  [] resolved [] not applicable     New Nutrition Diagnosis: [] Not applicable    Nutrition Care Plan:  [] In Progress  [] Achieved  [] Not applicable    Nutrition Interventions:     Education Provided:       [] Yes:  [] No:        Recommendations:         [] Continue current diet order  Feeds of Glucerna 1.2 at 75 ml x 24hr, provides 2160 kcal, 108 g/protein, 1739 free water      Charly(7 Gm Arginine/7 Gm Glut/1.2 Gm HMB     Qty per Day:  2  Banatrol TF     Qty per Day:  4  Supplement Feeding Modality:  Gastrostomy  Probiotic Yogurt/Smoothie Cans or Servings Per Day:  2       Frequency:  Daily (01-16-22)  Continue multivitamin             [] Add oral nutrition supplement:     [] Discontinue current diet order. Recommend:      [] Add micronutrient supplementation:      [] Continue current micronutrient supplementation:      [] Other:     Monitoring and Evaluation:   Continue to monitor nutritional intake, tolerance to diet prescription, weights, labs, skin integrity      RD remains available upon request and will follow up per protocol Nutrition Follow Up Note  Patient seen for:    Chart reviewed, events noted. "Pt is a 61M with PMH of HTN, CAD s/p stent on DAPT, T2DM who complained of headache and subsequently became unresponsive found to have posterior fossa hemorrhage, IVH, hydrocephalus, s/p EVD and SOC on 11/4. 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 Course c/b respiratory failure and dysphagia s/p trach/PEG (11/19), VPS (11/23), Certas @4 completed course of cefepime for enterobacter and pseudomonas pneumonia on 11/21, fevers, c. diff colitis, urinary retention, b/l distal DVT. Trach changed 12/15 to #7 cuffless due to dislodgement. 12/16 CT A/P wo concern for infectious process. but concern for bladder lesion, wife will pursue possible treatment after rehab. Now with another bout of C.Diff found after fever workup, on PO vanco per ID, blood cultures negative to date. +Rectal tube"      Source:     [x] EMR        [x] RN           -If unable to interview patient: [] Trach/Vent/BiPAP  [x] Disoriented/confused/inappropriate to interview    Diet Order:   Diet, NPO with Tube Feed:   Tube Feeding Modality: Gastrostomy  Glucerna 1.2 Blake (GLUCERNARTH)  Total Volume for 24 Hours (mL): 1800  Continuous  Until Goal Tube Feed Rate (mL per Hour): 75  Tube Feed Duration (in Hours): 24  Tube Feed Start Time: 12:00  Free Water Flush  Bolus   Total Volume per Flush (mL): 250   Frequency: Every 6 Hours  Charly(7 Gm Arginine/7 Gm Glut/1.2 Gm HMB     Qty per Day:  2  Banatrol TF     Qty per Day:  4  Supplement Feeding Modality:  Gastrostomy  Probiotic Yogurt/Smoothie Cans or Servings Per Day:  2       Frequency:  Daily (01-16-22)    - Is current order appropriate/adequate? [] Yes  []  No:   Feeds of Glucerna 1.2 at 75 ml x 24hr, provides 2160 kcal, 108 g/protein, 1739 free water  - PO intake :   [x] >75%  Adequate    [] 50-75%  Fair       [] <50%  Poor    - Nutrition-related concerns:  Spoke with RN, unable to interview due to AMS. Per nurse, Pt tolerating at goal rate of 75ml. Nurse reports, Pt to d/c C-diff.    GI:  Last BM 1/25/22.   Bowel Regimen? [] Yes   [] No      Weights: Per chart 11/10/21 190 pounds, 11/25/21 157.8 pounds. Dosing weight 191.3 11/4/21  Daily No recent weight history per chart.    Nutritionally Pertinent MEDICATIONS  (STANDING):  atorvastatin  dextrose 50% Injectable  dextrose 50% Injectable  diphenoxylate/atropine  doxazosin  insulin lispro (ADMELOG) corrective regimen sliding scale  insulin NPH human recombinant  metoprolol tartrate  multivitamin  pantoprazole  Injectable  psyllium Powder  vancomycin    Solution    Pertinent Labs: 01-25 @ 10:41: Na 130<L>, BUN 18, Cr 0.53, <H>, K+ 4.7, Phos --, Mg --, Alk Phos --, ALT/SGPT --, AST/SGOT --, HbA1c --    A1C with Estimated Average Glucose Result: 6.5 % (11-04-21 @ 21:43)    Finger Sticks:  POCT Blood Glucose.: 132 mg/dL (01-25 @ 12:13)  POCT Blood Glucose.: 139 mg/dL (01-25 @ 05:11)  POCT Blood Glucose.: 102 mg/dL (01-24 @ 23:34)  POCT Blood Glucose.: 108 mg/dL (01-24 @ 17:33)  POCT Blood Glucose.: 125 mg/dL (01-24 @ 16:20)      Skin per nursing documentation:   Edema:     Estimated Needs:   [] no change since previous assessment  [] recalculated:     Previous Nutrition Diagnosis:   Nutrition Diagnosis is: [] ongoing  [] resolved [] not applicable     New Nutrition Diagnosis: [] Not applicable    Nutrition Care Plan:  [] In Progress  [] Achieved  [] Not applicable    Nutrition Interventions:     Education Provided:       [] Yes:  [] No:        Recommendations:         [] Continue current diet order  Feeds of Glucerna 1.2 at 75 ml x 24hr, provides 2160 kcal, 108 g/protein, 1739 free water      Charly(7 Gm Arginine/7 Gm Glut/1.2 Gm HMB     Qty per Day:  2  Banatrol TF     Qty per Day:  4  Supplement Feeding Modality:  Gastrostomy  Probiotic Yogurt/Smoothie Cans or Servings Per Day:  2       Frequency:  Daily (01-16-22)  Continue multivitamin             [] Add oral nutrition supplement:     [] Discontinue current diet order. Recommend:      [] Add micronutrient supplementation:      [] Continue current micronutrient supplementation:      [] Other:     Monitoring and Evaluation:   Continue to monitor nutritional intake, tolerance to diet prescription, weights, labs, skin integrity      RD remains available upon request and will follow up per protocol Nutrition Follow Up Note  Patient seen for:    Chart reviewed, events noted. "Pt is a 61M with PMH of HTN, CAD s/p stent on DAPT, T2DM who complained of headache and subsequently became unresponsive found to have posterior fossa hemorrhage, IVH, hydrocephalus, s/p EVD and SOC on 11/4. 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 Course c/b respiratory failure and dysphagia s/p trach/PEG (11/19), VPS (11/23), Certas @4 completed course of cefepime for enterobacter and pseudomonas pneumonia on 11/21, fevers, c. diff colitis, urinary retention, b/l distal DVT. Trach changed 12/15 to #7 cuffless due to dislodgement. 12/16 CT A/P wo concern for infectious process. but concern for bladder lesion, wife will pursue possible treatment after rehab. Now with another bout of C.Diff found after fever workup, on PO vanco per ID, blood cultures negative to date. +Rectal tube"      Source:     [x] EMR        [x] RN           -If unable to interview patient: [] Trach/Vent/BiPAP  [x] Disoriented/confused/inappropriate to interview    Diet Order:   Diet, NPO with Tube Feed:   Tube Feeding Modality: Gastrostomy  Glucerna 1.2 Blake (GLUCERNARTH)  Total Volume for 24 Hours (mL): 1800  Continuous  Until Goal Tube Feed Rate (mL per Hour): 75  Tube Feed Duration (in Hours): 24  Tube Feed Start Time: 12:00  Free Water Flush  Bolus   Total Volume per Flush (mL): 250   Frequency: Every 6 Hours  Charly(7 Gm Arginine/7 Gm Glut/1.2 Gm HMB     Qty per Day:  2  Banatrol TF     Qty per Day:  4  Supplement Feeding Modality:  Gastrostomy  Probiotic Yogurt/Smoothie Cans or Servings Per Day:  2       Frequency:  Daily (01-16-22)    - Is current order appropriate/adequate? [x] Yes  []  No:   Feeds of Glucerna 1.2 at 75 ml x 24hr, provides 2160 kcal, 108 g/protein, 1739 free water  - PO intake :   [x] >75%  Adequate    [] 50-75%  Fair       [] <50%  Poor    - Nutrition-related concerns:  Spoke with RN, unable to interview due to AMS. Per nurse, Pt tolerating at goal rate of 75ml. Nurse reports, Pt to d/c C-diff.    GI:  Last BM 1/25/22.   Bowel Regimen? [] Yes   [] No  RN reports no GI distress today 1/25/22, but did have diarrhea yesterday 1/24/22    Weights: Per chart 11/10/21 190 pounds, 11/25/21 157.8 pounds. Dosing weight 191.3 11/4/21  Daily No recent weight history per chart.    Nutritionally Pertinent MEDICATIONS  (STANDING):  atorvastatin  dextrose 50% Injectable  dextrose 50% Injectable  diphenoxylate/atropine  doxazosin  insulin lispro (ADMELOG) corrective regimen sliding scale  insulin NPH human recombinant  metoprolol tartrate  multivitamin  pantoprazole  Injectable  psyllium Powder  vancomycin    Solution    Pertinent Labs: 01-25 @ 10:41: Na 130<L>, BUN 18, Cr 0.53, <H>, K+ 4.7, Phos --, Mg --, Alk Phos --, ALT/SGPT --, AST/SGOT --, HbA1c --    A1C with Estimated Average Glucose Result: 6.5 % (11-04-21 @ 21:43)    Finger Sticks:  POCT Blood Glucose.: 132 mg/dL (01-25 @ 12:13)  POCT Blood Glucose.: 139 mg/dL (01-25 @ 05:11)  POCT Blood Glucose.: 102 mg/dL (01-24 @ 23:34)  POCT Blood Glucose.: 108 mg/dL (01-24 @ 17:33)  POCT Blood Glucose.: 125 mg/dL (01-24 @ 16:20)      Skin per nursing documentation: 12/23/21 right buttock  Edema: 1/25/22 2+ generalized, 3+ ashley hands    Estimated Needs:   [x] no change since previous assessment  [] recalculated:     Previous Nutrition Diagnosis: 1. Moderate Malnutrition 2. Increased Nutrient Needs  Nutrition Diagnosis is: [x] ongoing  [] resolved [] not applicable     New Nutrition Diagnosis: [x] Not applicable    Nutrition Care Plan:  [x] In Progress  [] Achieved  [] Not applicable    Nutrition Interventions:     Education Provided:       [] Yes:  [x] No:        Recommendations:         [x] Continue feed as tolerated of Glucerna 1.2 at 75 ml x 24hr, provides 2160 kcal, 108 g/protein, 1739 free water  [x]Continue Charly 2 x day   [x] Discontinue Banatrol     [x] Continue Probiotic Yogurt/Smoothie Cans  2 x day        [x] Continue multivitamin           Monitoring and Evaluation:   Continue to monitor nutritional intake, tolerance to diet prescription, weights, labs, skin integrity, GI status      RD remains available upon request and will follow up per protocol    Nazanin Crum, Dietetic Intern   Pager: [phone] Nutrition Follow Up Note  Patient seen for:    Chart reviewed, events noted. "Pt is a 61M with PMH of HTN, CAD s/p stent on DAPT, T2DM who complained of headache and subsequently became unresponsive found to have posterior fossa hemorrhage, IVH, hydrocephalus, s/p EVD and SOC on 11/4. 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 Course c/b respiratory failure and dysphagia s/p trach/PEG (11/19), VPS (11/23), Certas @4 completed course of cefepime for enterobacter and pseudomonas pneumonia on 11/21, fevers, c. diff colitis, urinary retention, b/l distal DVT. Trach changed 12/15 to #7 cuffless due to dislodgement. 12/16 CT A/P wo concern for infectious process. but concern for bladder lesion, wife will pursue possible treatment after rehab. Now with another bout of C.Diff found after fever workup, on PO vanco per ID, blood cultures negative to date. +Rectal tube"      Source:     [x] EMR        [x] RN           -If unable to interview patient: [] Trach/Vent/BiPAP  [x] Disoriented/confused/inappropriate to interview    Diet Order:   Diet, NPO with Tube Feed:   Tube Feeding Modality: Gastrostomy  Glucerna 1.2 Blake (GLUCERNARTH)  Total Volume for 24 Hours (mL): 1800  Continuous  Until Goal Tube Feed Rate (mL per Hour): 75  Tube Feed Duration (in Hours): 24  Tube Feed Start Time: 12:00  Free Water Flush  Bolus   Total Volume per Flush (mL): 250   Frequency: Every 6 Hours  Charly(7 Gm Arginine/7 Gm Glut/1.2 Gm HMB     Qty per Day:  2  Banatrol TF     Qty per Day:  4  Supplement Feeding Modality:  Gastrostomy  Probiotic Yogurt/Smoothie Cans or Servings Per Day:  2       Frequency:  Daily (01-16-22)    - Is current order appropriate/adequate? [x] Yes  []  No:     Tube feeds of Glucerna 1.2 at 75 ml x 24hr, provides 2160 kcal, 108 g/protein, 1739 free water    - PO intake :   [x] >75%  Adequate    [] 50-75%  Fair       [] <50%  Poor    - Nutrition-related concerns:  Spoke with RN, unable to interview due to AMS. Per nurse, Pt tolerating at goal rate of 75ml. Nurse reports, Pt to d/c C-diff.    GI:  Last BM 1/25/22.   Bowel Regimen? [] Yes   [] No  RN reports no GI distress today 1/25/22, but did have diarrhea yesterday 1/24/22    Weights: Per chart 11/10/21 190 pounds, 11/25/21 157.8 pounds. Dosing weight 191.3 11/4/21  Daily No recent weight history per chart.    Nutritionally Pertinent MEDICATIONS  (STANDING):  atorvastatin  dextrose 50% Injectable  dextrose 50% Injectable  diphenoxylate/atropine  doxazosin  insulin lispro (ADMELOG) corrective regimen sliding scale  insulin NPH human recombinant  metoprolol tartrate  multivitamin  pantoprazole  Injectable  psyllium Powder  vancomycin    Solution    Pertinent Labs: 01-25 @ 10:41: Na 130<L>, BUN 18, Cr 0.53, <H>, K+ 4.7, Phos --, Mg --, Alk Phos --, ALT/SGPT --, AST/SGOT --, HbA1c --    A1C with Estimated Average Glucose Result: 6.5 % (11-04-21 @ 21:43)    Finger Sticks:  POCT Blood Glucose.: 132 mg/dL (01-25 @ 12:13)  POCT Blood Glucose.: 139 mg/dL (01-25 @ 05:11)  POCT Blood Glucose.: 102 mg/dL (01-24 @ 23:34)  POCT Blood Glucose.: 108 mg/dL (01-24 @ 17:33)  POCT Blood Glucose.: 125 mg/dL (01-24 @ 16:20)      Skin per nursing documentation: 12/23/21 right buttock  Edema: 1/25/22 2+ generalized, 3+ ashley hands    Estimated Needs:   [x] no change since previous assessment  [] recalculated:     Previous Nutrition Diagnosis: 1. Moderate Malnutrition 2. Increased Nutrient Needs  Nutrition Diagnosis is: [x] ongoing  [] resolved [] not applicable     New Nutrition Diagnosis: [x] Not applicable    Nutrition Care Plan:  [x] In Progress  [] Achieved  [] Not applicable    Nutrition Interventions:     Education Provided:       [] Yes:  [x] No:        Recommendations:         [x] Continue feed as tolerated of Glucerna 1.2 at 75 ml x 24hr, provides 2160 kcal, 108 g/protein, 1739 free water  [x]Continue Charly 2 x day   [x] Discontinue Banatrol     [x] Continue Probiotic Yogurt/Smoothie Cans  2 x day        [x] Continue multivitamin           Monitoring and Evaluation:   Continue to monitor nutritional intake, tolerance to diet prescription, weights, labs, skin integrity, GI status      RD remains available upon request and will follow up per protocol    Nazanin Crum, Dietetic Intern   Pager: [phone] Nutrition Follow Up Note  Patient seen for:    Chart reviewed, events noted. "Pt is a 61M with PMH of HTN, CAD s/p stent on DAPT, T2DM who complained of headache and subsequently became unresponsive found to have posterior fossa hemorrhage, IVH, hydrocephalus, s/p EVD and SOC on 11/4. 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 Course c/b respiratory failure and dysphagia s/p trach/PEG (11/19), VPS (11/23), Certas @4 completed course of cefepime for enterobacter and pseudomonas pneumonia on 11/21, fevers, c. diff colitis, urinary retention, b/l distal DVT. Trach changed 12/15 to #7 cuffless due to dislodgement. 12/16 CT A/P wo concern for infectious process. but concern for bladder lesion, wife will pursue possible treatment after rehab. Now with another bout of C.Diff found after fever workup, on PO vanco per ID, blood cultures negative to date. +Rectal tube"      Source:     [x] EMR        [x] RN           -If unable to interview patient: [] Trach/Vent/BiPAP  [x] Disoriented/confused/inappropriate to interview    Diet Order:   Diet, NPO with Tube Feed:   Tube Feeding Modality: Gastrostomy  Glucerna 1.2 Blake (GLUCERNARTH)  Total Volume for 24 Hours (mL): 1800  Continuous  Until Goal Tube Feed Rate (mL per Hour): 75  Tube Feed Duration (in Hours): 24  Tube Feed Start Time: 12:00  Free Water Flush  Bolus   Total Volume per Flush (mL): 250   Frequency: Every 6 Hours  Charly(7 Gm Arginine/7 Gm Glut/1.2 Gm HMB     Qty per Day:  2  Banatrol TF     Qty per Day:  4  Supplement Feeding Modality:  Gastrostomy  Probiotic Yogurt/Smoothie Cans or Servings Per Day:  2       Frequency:  Daily (01-16-22)    - Is current order appropriate/adequate? [x] Yes  []  No:     Tube feeds of Glucerna 1.2 at 75 ml x 24hr, provides 2160 kcal, 108 g/protein, 1739 free water    - PO intake :   [x] >75%  Adequate    [] 50-75%  Fair       [] <50%  Poor    - Nutrition-related concerns:  Spoke with RN, unable to interview due to AMS. Per nurse, Pt tolerating at goal rate of 75ml. Nurse reports, Pt to d/c C-diff.    GI:  Last BM 1/25/22.   Bowel Regimen? [] Yes   [] No  RN reports no GI distress today 1/25/22, but did have diarrhea yesterday 1/24/22    Weights: Per chart 11/10/21 190 pounds, 11/25/21 157.8 pounds. Dosing weight 191.3 11/4/21  Daily No recent weight history per chart.    Nutritionally Pertinent MEDICATIONS  (STANDING):  atorvastatin  dextrose 50% Injectable  dextrose 50% Injectable  diphenoxylate/atropine  doxazosin  insulin lispro (ADMELOG) corrective regimen sliding scale  insulin NPH human recombinant  metoprolol tartrate  multivitamin  pantoprazole  Injectable  psyllium Powder  vancomycin    Solution    Pertinent Labs: 01-25 @ 10:41: Na 130<L>, BUN 18, Cr 0.53, <H>, K+ 4.7, Phos --, Mg --, Alk Phos --, ALT/SGPT --, AST/SGOT --, HbA1c --    A1C with Estimated Average Glucose Result: 6.5 % (11-04-21 @ 21:43)    Finger Sticks:  POCT Blood Glucose.: 132 mg/dL (01-25 @ 12:13)  POCT Blood Glucose.: 139 mg/dL (01-25 @ 05:11)  POCT Blood Glucose.: 102 mg/dL (01-24 @ 23:34)  POCT Blood Glucose.: 108 mg/dL (01-24 @ 17:33)  POCT Blood Glucose.: 125 mg/dL (01-24 @ 16:20)      Skin per nursing documentation: 12/23/21 right buttock  Edema: 1/25/22 2+ generalized, 3+ ashley hands    Estimated Needs:   [x] no change since previous assessment  [] recalculated:     Previous Nutrition Diagnosis: 1. Moderate Malnutrition 2. Increased Nutrient Needs  Nutrition Diagnosis is: [x] ongoing  [] resolved [] not applicable     New Nutrition Diagnosis: [x] Not applicable    Nutrition Care Plan:  [x] In Progress  [] Achieved  [] Not applicable    Nutrition Interventions:     Education Provided:       [] Yes:  [x] No:        Recommendations:         [x] Continue feed as tolerated of Glucerna 1.2 at 75 ml x 24hr, provides 2160 kcal, 108 g/protein. Defer free water to team.  [x]Continue Charly 2 x day   [x] Discontinue Banatrol     [x] Continue Probiotic Yogurt/Smoothie Cans  2 x day        [x] Continue multivitamin           Monitoring and Evaluation:   Continue to monitor nutritional intake, tolerance to diet prescription, weights, labs, skin integrity, GI status      RD remains available upon request and will follow up per protocol    Nazanin Crum, Dietetic Intern   Pager: [phone] Nutrition Follow Up Note  Patient seen for: Pt seen for tube feeding follow up     Chart reviewed, events noted. "Pt is a 61M with PMH of HTN, CAD s/p stent on DAPT, T2DM who complained of headache and subsequently became unresponsive found to have posterior fossa hemorrhage, IVH, hydrocephalus, s/p EVD and SOC on 11/4. 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 Course c/b respiratory failure and dysphagia s/p trach/PEG (11/19), VPS (11/23), Certas @4 completed course of cefepime for enterobacter and pseudomonas pneumonia on 11/21, fevers, c. diff colitis, urinary retention, b/l distal DVT. Trach changed 12/15 to #7 cuffless due to dislodgement. 12/16 CT A/P wo concern for infectious process. but concern for bladder lesion, wife will pursue possible treatment after rehab. Now with another bout of C.Diff found after fever workup, on PO vanco per ID, blood cultures negative to date.      Source:     [x] EMR        [x] RN           -If unable to interview patient: [] Trach/Vent/BiPAP  [x] Disoriented/confused/inappropriate to interview    Diet Order:   Diet, NPO with Tube Feed:   Tube Feeding Modality: Gastrostomy  Glucerna 1.2 Blake (GLUCERNARTH)  Total Volume for 24 Hours (mL): 1800  Continuous  Until Goal Tube Feed Rate (mL per Hour): 75  Tube Feed Duration (in Hours): 24  Tube Feed Start Time: 12:00  Free Water Flush  Bolus   Total Volume per Flush (mL): 250   Frequency: Every 6 Hours  Charly(7 Gm Arginine/7 Gm Glut/1.2 Gm HMB     Qty per Day:  2  Banatrol TF     Qty per Day:  4  Supplement Feeding Modality:  Gastrostomy  Probiotic Yogurt/Smoothie Cans or Servings Per Day:  2       Frequency:  Daily (01-16-22)    - Is current order appropriate/adequate? [x] Yes  []  No:     Tube feeds of Glucerna 1.2 at 75 ml x 24hr, provides 2160 kcal, 108 g/protein, 1449 free water    - Nutrition-related concerns:  Spoke with RN, unable to interview due to AMS. Pt with trach. Per nurse, Pt tolerating feeds at 75ml/hr. Nurse reports, Pt C-diff precaution to discontinued.    GI:  Last BM 1/25/22.   Bowel Regimen? [X] Yes   [] No - Pt on Lomotil and Metamucil    RN reports no GI distress today 1/25/22, but did have diarrhea yesterday 1/24/22  Per RN Pt received Banatrol yesterday 1/24/22    Weights: Per chart 11/10/21 190 pounds, 11/25/21 157.8 pounds. Dosing weight 191.3 11/4/21  Daily Per chart, no recent weight history for review    Nutritionally Pertinent MEDICATIONS  (STANDING):  atorvastatin  dextrose 50% Injectable  dextrose 50% Injectable  diphenoxylate/atropine  doxazosin  insulin lispro (ADMELOG) corrective regimen sliding scale  insulin NPH human recombinant  metoprolol tartrate  multivitamin  pantoprazole  Injectable  psyllium Powder  vancomycin    Solution    Pertinent Labs: 01-25 @ 10:41: Na 130<L>, BUN 18, Cr 0.53, <H>, K+ 4.7, Phos --, Mg --, Alk Phos --, ALT/SGPT --, AST/SGOT --, HbA1c --    A1C with Estimated Average Glucose Result: 6.5 % (11-04-21 @ 21:43)    Finger Sticks:  POCT Blood Glucose.: 132 mg/dL (01-25 @ 12:13)  POCT Blood Glucose.: 139 mg/dL (01-25 @ 05:11)  POCT Blood Glucose.: 102 mg/dL (01-24 @ 23:34)  POCT Blood Glucose.: 108 mg/dL (01-24 @ 17:33)  POCT Blood Glucose.: 125 mg/dL (01-24 @ 16:20)      Skin per nursing documentation: 12/23/21 right buttock. Suspected deep tissue injury per chart  Edema: 1/25/22 2+ generalized, 3+ ashley hands    Estimated Needs:   [x] no change since previous assessment  Based on upper .6 pounds (83 kg)  - Energy needs:  25-30 kcal/kg (2485-3752 kcal/kg)  - Protein needs:  1.2-1.4 g/kg (99.6-116.2 g/kg)  - Defer fluid needs to medical team      [x] no change since previous assessment  [] recalculated:     Previous Nutrition Diagnosis: 1. Moderate Malnutrition 2. Increased Nutrient Needs  Nutrition Diagnosis is: [x] ongoing  [] resolved [] not applicable     New Nutrition Diagnosis: [x] Not applicable    Nutrition Care Plan:  [x] In Progress  [] Achieved  [] Not applicable    Nutrition Interventions:     Education Provided:       [] Yes:  [x] No:        Recommendations:         [x] Continue feeds as tolerated of Glucerna 1.2 at 75 ml x 24hr, provides total volume 1800ml  2160 kcal, 108 g/protein, free water 1449ml. Regimen provides 26 kcal/kg,  and 1.3 g/protein/kg based on IBW 83kg. Defer free water flushes to team.  [x]Continue Charly 2 x day   [x] Continue Banatrol  for loose stools PRN  [x] Continue Probiotic Yogurt/Smoothie Cans  2 x day        [x] Continue multivitamin           Monitoring and Evaluation:   Continue to monitor nutritional intake, tolerance to diet prescription, weights, labs, skin integrity, GI status      RD remains available upon request and will follow up per protocol    Nazanin Crum, Dietetic Intern   Pager: [phone]

## 2022-01-25 NOTE — PROVIDER CONTACT NOTE (OTHER) - ACTION/TREATMENT ORDERED:
provders at bedside. CXR ordered urgent
MD states that it is okay to hang bolus, ot obtain urine before hanging, will not affect sodium.
Will follow MD orders
waiting direction
Continue to monitor patient. reassess FS in one hour. continue patient on D5 IV fliuds. No further interventions.
MD Wendy Kelley confirmed similar exam earlier in day and not concerned. CT scan in morning
MD notified aware, at bedside. states patient waxes and wanes, no concern of neurological change per MD. will continue to monitor
Stop suctioning and attempt to suction in a half hour. If patient begins to destat call an RRT. Will order Xray chest. Will continue to monitor patient.
awaiting NSCU Team to bedside for evaluation.
Notified JOSE RAMON Vallejo regarding urine color. Free water increased by 200 mL.
Straight cath for urine 450cc.
Continue monitoring blood sugar q6h.
Tylenol already given, no other intervention at this time.
provider notified, D5 IVP 12.g grams to be given, D5 @60 ml/ hr to be started, will check fingerstick q15 minutes until greater than 100.
Feeds held for 1 hr.
Feeds paused, pt started on fluids.
provider at bedside, called ENT for consult regarding trach, chest x ray ordered, IV acetaminophen and 1L bolus ordered

## 2022-01-25 NOTE — PROVIDER CONTACT NOTE (OTHER) - REASON
Loose BM
Patient with temp 100.6 axillary
Pt having urinary retention
bleeding from trach
patient hypoglycemic; during protocol went from 95 to 89
pt hypoglycemic 63 fingerstick
desat to 85%, tachy to 130s
Patient is not withdrawing from pain on all extremities and has +3 edema on R hand
patients serum sodium resulted at 130
ICP -1 For past few hours/ no output from EVD
Patient with increasing lethargy, lack of movement on 00:00 assessment
Patient was straight catheterized and urine output was tea colored.
pt tachy with abnormal breath sounds
Finger stick repeated, blood sugar 84
Pt found to be flat in the bed while tube feeds were running; copious yellow secretions suctioned out of trach
Pt less responsive to stimuli and not following commands

## 2022-01-25 NOTE — DISCHARGE NOTE PROVIDER - NSDCMRMEDTOKEN_GEN_ALL_CORE_FT
aspirin 81 mg oral tablet: 1 tab(s) orally once a day  atorvastatin 80 mg oral tablet: 1 tab(s) orally once a day (at bedtime)  benazepril 20 mg oral tablet: 1 tab(s) orally once a day  chlorthalidone 25 mg oral tablet: 1 tab(s) orally once a day  metFORMIN 1000 mg oral tablet: 1 tab(s) orally 2 times a day  metoprolol succinate 25 mg oral tablet, extended release: 1 tab(s) orally once a day  pioglitazone 30 mg oral tablet: 1 tab(s) orally once a day  Plavix 75 mg oral tablet: 1 tab(s) orally once a day  Trulicity Pen 0.75 mg/0.5 mL subcutaneous solution: 1 application subcutaneous every 7 days   acetylcysteine 20% inhalation solution: 4 milliliter(s) inhaled every 6 hours  apixaban 5 mg oral tablet: 1 tab(s) orally every 12 hours  aspirin 81 mg oral tablet, chewable: 1 tab(s) orally once a day  atorvastatin 40 mg oral tablet: 1 tab(s) orally once a day (at bedtime)  doxazosin 2 mg oral tablet: 1 tab(s) orally once a day  ipratropium-albuterol 0.5 mg-2.5 mg/3 mL inhalation solution: 3 milliliter(s) inhaled every 6 hours  metFORMIN 1000 mg oral tablet: 1 tab(s) orally 2 times a day  metoprolol: 12.5 milligram(s) by PEG tube 2 times a day  Multiple Vitamins oral tablet: 1 tab(s) orally once a day via PEG  ocular lubricant ophthalmic solution: 1 drop(s) to each affected eye every 6 hours  pioglitazone 30 mg oral tablet: 1 tab(s) orally once a day  Protonix 40 mg oral delayed release tablet: 1 tab(s) orally once a day  psyllium 3.4 g/7 g oral powder for reconstitution: 1 packet(s) orally once a day via PEG  Trulicity Pen 0.75 mg/0.5 mL subcutaneous solution: 1 application subcutaneous every 7 days  vancomycin 25 mg/mL oral liquid: 5 milliliter(s) orally every other day via PEG x 7 days    acetylcysteine 20% inhalation solution: 4 milliliter(s) inhaled every 6 hours  apixaban 5 mg oral tablet: 1 tab(s) orally every 12 hours  aspirin 81 mg oral tablet, chewable: 1 tab(s) orally once a day  atorvastatin 40 mg oral tablet: 1 tab(s) orally once a day (at bedtime)  doxazosin 2 mg oral tablet: 1 tab(s) orally once a day  ipratropium-albuterol 0.5 mg-2.5 mg/3 mL inhalation solution: 3 milliliter(s) inhaled every 6 hours  metFORMIN 1000 mg oral tablet: 1 tab(s) orally 2 times a day via PEG  metoprolol: 12.5 milligram(s) by PEG tube 2 times a day  Multiple Vitamins oral tablet: 1 tab(s) orally once a day via PEG  ocular lubricant ophthalmic solution: 1 drop(s) to each affected eye every 6 hours  Protonix 40 mg oral delayed release tablet: 1 tab(s) orally once a day  psyllium 3.4 g/7 g oral powder for reconstitution: 1 packet(s) orally once a day via PEG  vancomycin 25 mg/mL oral liquid: 5 milliliter(s) orally every other day via PEG x 7 days

## 2022-01-25 NOTE — DISCHARGE NOTE PROVIDER - NSDCCPCAREPLAN_GEN_ALL_CORE_FT
PRINCIPAL DISCHARGE DIAGNOSIS  Diagnosis: IVH (intraventricular hemorrhage)  Assessment and Plan of Treatment: s/p VPS placement on 11/23.  s/p PICA aneurysm resection.      SECONDARY DISCHARGE DIAGNOSES  Diagnosis: Gram-negative pneumonia  Assessment and Plan of Treatment: Completed course of antibiotics    Diagnosis: C. difficile diarrhea  Assessment and Plan of Treatment: Continue vanco taper as prescribed    Diagnosis: Anemia  Assessment and Plan of Treatment: Hgb stable  No signs of bleeding    Diagnosis: Lesion of bladder  Assessment and Plan of Treatment: CT a/p with a 2.4 cm nodular soft tissue density in the bladder appears separate from the prostate gland, concerning for bladder lesion  PSA normal, urine cytology negative for malignancy  Outpatient follow up with Urology    Diagnosis: Tracheostomy in place  Assessment and Plan of Treatment: Continue trach care and suctioning     PRINCIPAL DISCHARGE DIAGNOSIS  Diagnosis: IVH (intraventricular hemorrhage)  Assessment and Plan of Treatment: s/p VPS placement on 11/23.  s/p PICA aneurysm resection.  plavix on hold      SECONDARY DISCHARGE DIAGNOSES  Diagnosis: DM (diabetes mellitus)  Assessment and Plan of Treatment: cont metformin as ordered via PEG    Diagnosis: HTN (hypertension)  Assessment and Plan of Treatment: chlorthalidone on hold, cont other meds    Diagnosis: Tracheostomy in place  Assessment and Plan of Treatment: Continue trach care and suctioning    Diagnosis: C. difficile diarrhea  Assessment and Plan of Treatment: Continue vanco taper as prescribed    Diagnosis: Gram-negative pneumonia  Assessment and Plan of Treatment: Completed course of antibiotics    Diagnosis: Anemia  Assessment and Plan of Treatment: Hgb stable  No signs of bleeding, one unit of PRBC given today    Diagnosis: Lesion of bladder  Assessment and Plan of Treatment: CT a/p with a 2.4 cm nodular soft tissue density in the bladder appears separate from the prostate gland, concerning for bladder lesion  PSA normal, urine cytology negative for malignancy  Outpatient follow up with Urology

## 2022-01-25 NOTE — PROGRESS NOTE ADULT - ASSESSMENT
60 yo male with PMH of HTN   Admitted on 11/4 with severe headache and found to have cerebellar bleed.  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  S/p trach and peg.   He has distal DVT's - on apixaban.  He received cefepime 11/4-11/21 for respiratory coverage.  Developed watery C diff diarrhea on 11/26 - started on oral vancomycin   Then restarted on cefepime on 11/28 for concerns of pneumonia since had low grade temps. Giorgio neb added later  But CXR's remained clear.  Ct of brain showed residual blood.  Pseudomonas in sputum found to be resistant to carbapenems & quinolones. Sputum isolated strep pneumoniae as well    11/28 urine and blood cultures are negative.  Cefepime completed on 12/06 & Giorgio nebs on 12/07/21  Failed TOV & teixeira was replaced for retention of 1000 ml of urine on 12/06/21 PM  Completed treatment for C diff w 2 wks of oral vanco on 12/10/21  Diarrhea resolved  He developed recurrent fever and relapsed with C diff  Vanco restarted on 12/22  He had a rectal tube in place, it has been removed  At high risk for aspiration and  infections given history of retention and poor mental status.  His fecal output has decreased   complete two weeks po vancomycin now on taper , 125 daily.  Significance of low grade temp not clear.  He appears stable in general.  His respiratory status appears stable  Pyuria not unexpected with teixeira  Plan   1. Continue daily vanco, C diff appears controlled , perhaps we will advise  125 every other day  after 1 week of daily vanco   2.Follow clinically  3.If temps escalate will need blood cultures, CXR and additional w/u.Will try to be judicious with antibiotic use in this patient for a variety of reasons.  4.Thanks, will follow

## 2022-01-25 NOTE — PROGRESS NOTE ADULT - PROBLEM SELECTOR PLAN 3
Hg stable 7.4, recheck in AM   no obvious source of bleeding.  there is presumed anemia of chronic disease given no obvious bleeding

## 2022-01-25 NOTE — PROVIDER CONTACT NOTE (OTHER) - SITUATION
Patient is not withdrawing from pain on all extremities and has +3 edema on R hand
Reported by night RN that pt fingerstick was 86 this AM, and was 78 one hour later. Finger stick again repeated one hour later, results 84.
Pt admitted for non traumatic intracranial hemorrhage.  Pt currently on PEG tube feeds running at goal. Pt noted to have 4 large loose BMs throughout the day.
pt tachy with abnormal breath sounds
pt sp crani for ivh
Pt found to be flat in the bed while tube feeds were running; copious yellow secretions suctioned out of trach.
patients serum sodium resulted at 130. questioning if it is okay for 500cc bolus over 3 hours
Pt less responsive to stimuli and not following commands
Patient with increasing lethargy, lack of movement on 00:00 assessment
Pt on/off febrile over the last week. ID following, pt is on cefepime. Pt temp 100.6 axillary. RN gave Tylenol 650 via PEG as ordered for fever. See Med Rec.
desat to 85%, tachy to 130s
patient hypoglycemic; during protocol went from 95 to 89 during q 15 minute checks
pt  S/P craniotomy
ICH
Patient was straight catheterized and urine output was tea colored.
pt hypoglycemic 63 blood glucose
Pt received AOx4, ambulatory w/ assistance at baseline w. pmhx of vocal cord cancer s/p resection (currently on chemo), PEG tube, HTN, HLD, PPM presents w/ daughter at bedside for insomnia for the past 5 days. Took melatonin but did not work. daughter also states pt has been twitching every 30 min. Also endorsing intermittent palpitations. Denies any other symptoms. 22G placed in left hand.

## 2022-01-25 NOTE — DISCHARGE NOTE PROVIDER - HOSPITAL COURSE
61M with PMH of HTN, CAD s/p stent on DAPT, T2DM who complained of headache and subsequently became unresponsive found to have posterior fossa hemorrhage, IVH, hydrocephalus, s/p EVD and SOC on 11/4. 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 Course c/b respiratory failure and dysphagia s/p trach/PEG (11/19), VPS (11/23), completed course of cefepime for enterobacter and pseudomonas pneumonia on 11/21, fevers, c. diff colitis, urinary retention, b/l distal DVT. New fevers again on 12/22 with diarrhea found to have recurrent c. diff infection on PO vanco. Trach dislodged again on 12/31, replaced by ENT, c/b small left pneumothorax, recurrent fever.    Fever.   ·  Plan: Resolved  - patient with copious secretions, episode of hypoxia improved after frequent suctioning  - completed IV Cefepime and flagyl for possible aspiration pneumonia 5/5 days on 1/8  - follow up blood cultures NGTD  - aspiration precaution  - on Vanco taper via PEG for c diff.    C. difficile diarrhea.   ·  Plan: Already completed course of PO vanco previously.  GI has a low suspicion for active C. Diff infection  - ID recommending continue PO vancomycin slow taper, 125 tid x 1 week, 125 BID x 1 week, 125 daily x 1 week, and 125 every other day x 2 weeks  - c/w Banatrol QID for loose stool, Cholestyramine(will decrease frequency) and c/w lomotil   - c/w lomotil to tid  - judicious use of abx therapy as may exacerbate c. diff.    Anemia.   ·  Plan: Hg stable 8s  no obvious source of bleeding.  there is presumed anemia of chronic disease given no obvious bleeding.    IVH (intraventricular hemorrhage).   ·  Plan: s/p VPS placement on 11/23.  s/p PICA aneurysm resection.    Respiratory failure.   ·  Plan: - trach dislodged a couple of times requiring adjustment by ENT. CT chest with high riding trach tube. ENT follow up  - continue trach care and suctioning  - monitor O2 sats  - continue nebs. changed to xopenex if tachycardia persists but overall improved  - small pneumothorax appears to be stable- appreciate thoracic surgery input  - chest PT if able  - Pulm has been consulted, not a RCU candidate at this time.    MICHELLE (acute kidney injury).   ·  Plan: Found to be in urinary retention requiring intermittent cath  MICHELLE resolved  - monitor urine output, renal function  - c/w doxazosin.    DVT of leg (deep venous thrombosis).   ·  Plan: Found to have Right soleal vein DVT and persistent left posterior tibial, peroneal, gastrocnemius and soleal vein DVT without proximal propagation  - c/w Eliquis 12/2, monitor H/H (stable).    CAD (coronary artery disease).   ·  Plan: s/p stent. was on DAPT per prescription refills   - ASA resumed  - c/w statin (transaminitis resolved)  - patient was on metoprolol ER 25mg at home. continue metoprolol 12.5mg BID.    HTN (hypertension).   ·  Plan: BP has been normotensive/low normal  - monitor BP.    DM (diabetes mellitus).   ·  Plan; HbA1c 6.5%.   continue NPH 12 units q6h.   monitor FS glucose.  FS acceptable.    Lesion of bladder.   ·  Plan: CT a/p with a 2.4 cm nodular soft tissue density in the bladder appears separate from the prostate gland, concerning for bladder lesion  - as per urology - findings concerning for bladder tumor vs prostate gland. cysto transurethral resection would require general anesthesia. recommended checking urine cytology to determine next steps which returned negative. will still need outpatient urology follow-up regardless once acute issues improve  - PSA normal, urine cytology negative for malignancy  - previously spoke to the patient's wife, Sandrita Bauer [phone]. Given the patient's underlying comorbid conditions coupled with his recent devastating neurologic events, other medical complications that ensued, and his poor neurologic and functional status at this time, it may be prudent to monitor his progress and see if he makes any meaningful recovery over the coming weeks to months to decide whether the benefit of pursuing any invasive work up for the bladder lesion outweighs the risk. Wife in agreement.  - Will require eventual outpatient f/u with Urology.   61M with PMH of HTN, CAD s/p stent on DAPT, T2DM who complained of headache and subsequently became unresponsive found to have posterior fossa hemorrhage, IVH, hydrocephalus, s/p EVD and SOC on 11/4. 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 Course c/b respiratory failure and dysphagia s/p trach/PEG (11/19), VPS (11/23), completed course of cefepime for enterobacter and pseudomonas pneumonia on 11/21, fevers, c. diff colitis, urinary retention, b/l distal DVT. New fevers again on 12/22 with diarrhea found to have recurrent c. diff infection on PO vanco. Trach dislodged again on 12/31, replaced by ENT, c/b small left pneumothorax, recurrent fever.  He was seen by Palliative care, NS, wound care svc, pulm, ID, GI, ENT and Podiatry. His h/h today is 7.1/22.7. He will be transfused one unit of PRBC and then he will be transferred to LTC today, spoke to Attending and CM.    Fever.   ·  Plan: Resolved  - patient with copious secretions, episode of hypoxia improved after frequent suctioning  - completed IV Cefepime and flagyl for possible aspiration pneumonia 5/5 days on 1/8  - follow up blood cultures NGTD  - aspiration precaution  - on Vanco taper via PEG for c diff.    C. difficile diarrhea.   ·  Plan: Already completed course of PO vanco previously.  GI has a low suspicion for active C. Diff infection  - ID recommending continue PO vancomycin slow taper, 125 tid x 1 week, 125 BID x 1 week, 125 daily x 1 week, and 125 every other day x 2 weeks  - c/w Banatrol QID for loose stool, Cholestyramine(will decrease frequency) and c/w lomotil   - c/w lomotil to tid  - judicious use of abx therapy as may exacerbate c. diff.    Anemia.   ·  Plan: Hg stable 8s  no obvious source of bleeding.  there is presumed anemia of chronic disease given no obvious bleeding.    IVH (intraventricular hemorrhage).   ·  Plan: s/p VPS placement on 11/23.  s/p PICA aneurysm resection.    Respiratory failure.   ·  Plan: - trach dislodged a couple of times requiring adjustment by ENT. CT chest with high riding trach tube. ENT follow up  - continue trach care and suctioning  - monitor O2 sats  - continue nebs. changed to xopenex if tachycardia persists but overall improved  - small pneumothorax appears to be stable- appreciate thoracic surgery input  - chest PT if able  - Pulm has been consulted, not a RCU candidate at this time.    MICHELLE (acute kidney injury).   ·  Plan: Found to be in urinary retention requiring intermittent cath  MICHELLE resolved  - monitor urine output, renal function  - c/w doxazosin.    DVT of leg (deep venous thrombosis).   ·  Plan: Found to have Right soleal vein DVT and persistent left posterior tibial, peroneal, gastrocnemius and soleal vein DVT without proximal propagation  - c/w Eliquis 12/2, monitor H/H (stable).    CAD (coronary artery disease).   ·  Plan: s/p stent. was on DAPT per prescription refills   - ASA resumed  - c/w statin (transaminitis resolved)  - patient was on metoprolol ER 25mg at home. continue metoprolol 12.5mg BID.    HTN (hypertension).   ·  Plan: BP has been normotensive/low normal  - monitor BP.    DM (diabetes mellitus).   ·  Plan; HbA1c 6.5%.   continue NPH 12 units q6h.   monitor FS glucose.  FS acceptable.    Lesion of bladder.   ·  Plan: CT a/p with a 2.4 cm nodular soft tissue density in the bladder appears separate from the prostate gland, concerning for bladder lesion  - as per urology - findings concerning for bladder tumor vs prostate gland. cysto transurethral resection would require general anesthesia. recommended checking urine cytology to determine next steps which returned negative. will still need outpatient urology follow-up regardless once acute issues improve  - PSA normal, urine cytology negative for malignancy  - previously spoke to the patient's wife, Sandrita Bauer [phone]. Given the patient's underlying comorbid conditions coupled with his recent devastating neurologic events, other medical complications that ensued, and his poor neurologic and functional status at this time, it may be prudent to monitor his progress and see if he makes any meaningful recovery over the coming weeks to months to decide whether the benefit of pursuing any invasive work up for the bladder lesion outweighs the risk. Wife in agreement.  - Will require eventual outpatient f/u with Urology.   61M with PMH of HTN, CAD s/p stent on DAPT, T2DM who complained of headache and subsequently became unresponsive found to have posterior fossa hemorrhage, IVH, hydrocephalus, s/p EVD and SOC on 11/4. 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 Course c/b respiratory failure and dysphagia s/p trach/PEG (11/19), VPS (11/23), completed course of cefepime for enterobacter and pseudomonas pneumonia on 11/21, fevers, c. diff colitis, urinary retention, b/l distal DVT. New fevers again on 12/22 with diarrhea found to have recurrent c. diff infection on PO vanco. Trach dislodged again on 12/31, replaced by ENT, c/b small left pneumothorax, Patient treated for aspiration PNA and Cdiff, fevers and diarrhea resolved     Patient seen and examined at bedside, remains at baseline mental status, opens eyes to verbal stimuli   - patient with copious secretions, continues to need frequent suctioning  - completed IV Cefepime and flagyl for possible aspiration pneumonia 5/5 days on 1/8  - follow up blood cultures NGTD  - aspiration precaution  - on Vanco taper via PEG for c diff  - noted to have low grade temp and pyuria/urine cx colonization expected which is expected with teixeira, ID was reconsulted rec patient is clinically stable, continue with current vanco taper, no need for further workup and abx.   - ID recommending continue PO vancomycin slow taper, 125 tid x 1 week, 125 BID x 1 week, 125 daily x 1 week, and 125 every other day x 2 weeks  - c/w Banatrol QID for loose stool, Cholestyramine(will decrease frequency) and c/w lomotil   - c/w lomotil to tid  - judicious use of abx therapy as may exacerbate c. diff.  - Hb 7.1 will give 1 unit PRBC prior to d/c, no obvious source of bleeding, there is presumed anemia of chronic disease given no obvious bleeding in agreement with previous hospitalist.  - continue trach care and suctioning  - continue nebs and chest PT if able  - MICHELLE and Hyponatremia resolved s/p IVF  - DVT: c/w Eliquis   - c/w ASA and statin (transaminitis resolved)  Patient has a bladder lesion:  - as per urology - findings concerning for bladder tumor vs prostate gland. cysto transurethral resection would require general anesthesia. recommended checking urine cytology to determine next steps which returned negative. will still need outpatient urology follow-up regardless once acute issues improve  - PSA normal, urine cytology negative for malignancy  - previously spoke to the patient's wife, Sandrita Bauer [phone]. Given the patient's underlying comorbid conditions coupled with his recent devastating neurologic events, other medical complications that ensued, and his poor neurologic and functional status at this time, it may be prudent to monitor his progress and see if he makes any meaningful recovery over the coming weeks to months to decide whether the benefit of pursuing any invasive work up for the bladder lesion outweighs the risk. Wife in agreement.  - Will require eventual outpatient f/u with Urology.    Patient is medically stable for d/c to [facility] today. d/w wife over the phone and CM.

## 2022-01-25 NOTE — PROGRESS NOTE ADULT - PROBLEM SELECTOR PLAN 12
DVT ppx: on Eliquis for DVT  - DNR    #hyponatremia Na 130 poss water deficit  - check urine na, Cr and osmo  - given  cc bolus   - check BMP in AM        Dispo: Patient has accepting facility however today is medically acute, f/u urine cx, ID rec and Na prior to d/c

## 2022-01-26 VITALS
RESPIRATION RATE: 18 BRPM | SYSTOLIC BLOOD PRESSURE: 136 MMHG | TEMPERATURE: 99 F | HEART RATE: 114 BPM | DIASTOLIC BLOOD PRESSURE: 70 MMHG | OXYGEN SATURATION: 98 %

## 2022-01-26 LAB
ANION GAP SERPL CALC-SCNC: 10 MMOL/L — SIGNIFICANT CHANGE UP (ref 5–17)
BLD GP AB SCN SERPL QL: NEGATIVE — SIGNIFICANT CHANGE UP
BUN SERPL-MCNC: 18 MG/DL — SIGNIFICANT CHANGE UP (ref 7–23)
CALCIUM SERPL-MCNC: 8.7 MG/DL — SIGNIFICANT CHANGE UP (ref 8.4–10.5)
CHLORIDE SERPL-SCNC: 98 MMOL/L — SIGNIFICANT CHANGE UP (ref 96–108)
CO2 SERPL-SCNC: 29 MMOL/L — SIGNIFICANT CHANGE UP (ref 22–31)
CREAT SERPL-MCNC: 0.55 MG/DL — SIGNIFICANT CHANGE UP (ref 0.5–1.3)
CULTURE RESULTS: SIGNIFICANT CHANGE UP
GLUCOSE BLDC GLUCOMTR-MCNC: 137 MG/DL — HIGH (ref 70–99)
GLUCOSE BLDC GLUCOMTR-MCNC: 82 MG/DL — SIGNIFICANT CHANGE UP (ref 70–99)
GLUCOSE SERPL-MCNC: 74 MG/DL — SIGNIFICANT CHANGE UP (ref 70–99)
HCT VFR BLD CALC: 22.7 % — LOW (ref 39–50)
HGB BLD-MCNC: 7.1 G/DL — LOW (ref 13–17)
MCHC RBC-ENTMCNC: 28.1 PG — SIGNIFICANT CHANGE UP (ref 27–34)
MCHC RBC-ENTMCNC: 31.3 GM/DL — LOW (ref 32–36)
MCV RBC AUTO: 89.7 FL — SIGNIFICANT CHANGE UP (ref 80–100)
NRBC # BLD: 0 /100 WBCS — SIGNIFICANT CHANGE UP (ref 0–0)
PLATELET # BLD AUTO: 411 K/UL — HIGH (ref 150–400)
POTASSIUM SERPL-MCNC: 4.1 MMOL/L — SIGNIFICANT CHANGE UP (ref 3.5–5.3)
POTASSIUM SERPL-SCNC: 4.1 MMOL/L — SIGNIFICANT CHANGE UP (ref 3.5–5.3)
RBC # BLD: 2.53 M/UL — LOW (ref 4.2–5.8)
RBC # FLD: 13.6 % — SIGNIFICANT CHANGE UP (ref 10.3–14.5)
RH IG SCN BLD-IMP: POSITIVE — SIGNIFICANT CHANGE UP
SODIUM SERPL-SCNC: 137 MMOL/L — SIGNIFICANT CHANGE UP (ref 135–145)
SPECIMEN SOURCE: SIGNIFICANT CHANGE UP
WBC # BLD: 8.22 K/UL — SIGNIFICANT CHANGE UP (ref 3.8–10.5)
WBC # FLD AUTO: 8.22 K/UL — SIGNIFICANT CHANGE UP (ref 3.8–10.5)

## 2022-01-26 PROCEDURE — 87077 CULTURE AEROBIC IDENTIFY: CPT

## 2022-01-26 PROCEDURE — 87086 URINE CULTURE/COLONY COUNT: CPT

## 2022-01-26 PROCEDURE — 97130 THER IVNTJ EA ADDL 15 MIN: CPT

## 2022-01-26 PROCEDURE — 84145 PROCALCITONIN (PCT): CPT

## 2022-01-26 PROCEDURE — 84295 ASSAY OF SERUM SODIUM: CPT

## 2022-01-26 PROCEDURE — 74177 CT ABD & PELVIS W/CONTRAST: CPT

## 2022-01-26 PROCEDURE — 93888 INTRACRANIAL LIMITED STUDY: CPT

## 2022-01-26 PROCEDURE — 83036 HEMOGLOBIN GLYCOSYLATED A1C: CPT

## 2022-01-26 PROCEDURE — 81003 URINALYSIS AUTO W/O SCOPE: CPT

## 2022-01-26 PROCEDURE — 84146 ASSAY OF PROLACTIN: CPT

## 2022-01-26 PROCEDURE — 94002 VENT MGMT INPAT INIT DAY: CPT

## 2022-01-26 PROCEDURE — 80048 BASIC METABOLIC PNL TOTAL CA: CPT

## 2022-01-26 PROCEDURE — C1760: CPT

## 2022-01-26 PROCEDURE — 82330 ASSAY OF CALCIUM: CPT

## 2022-01-26 PROCEDURE — 70450 CT HEAD/BRAIN W/O DYE: CPT | Mod: MA

## 2022-01-26 PROCEDURE — 88304 TISSUE EXAM BY PATHOLOGIST: CPT

## 2022-01-26 PROCEDURE — 96374 THER/PROPH/DIAG INJ IV PUSH: CPT

## 2022-01-26 PROCEDURE — 87640 STAPH A DNA AMP PROBE: CPT

## 2022-01-26 PROCEDURE — 86901 BLOOD TYPING SEROLOGIC RH(D): CPT

## 2022-01-26 PROCEDURE — 83615 LACTATE (LD) (LDH) ENZYME: CPT

## 2022-01-26 PROCEDURE — 84132 ASSAY OF SERUM POTASSIUM: CPT

## 2022-01-26 PROCEDURE — 87483 CNS DNA AMP PROBE TYPE 12-25: CPT

## 2022-01-26 PROCEDURE — 94799 UNLISTED PULMONARY SVC/PX: CPT

## 2022-01-26 PROCEDURE — 85025 COMPLETE CBC W/AUTO DIFF WBC: CPT

## 2022-01-26 PROCEDURE — 92507 TX SP LANG VOICE COMM INDIV: CPT

## 2022-01-26 PROCEDURE — 87449 NOS EACH ORGANISM AG IA: CPT

## 2022-01-26 PROCEDURE — 83605 ASSAY OF LACTIC ACID: CPT

## 2022-01-26 PROCEDURE — 82945 GLUCOSE OTHER FLUID: CPT

## 2022-01-26 PROCEDURE — 82272 OCCULT BLD FECES 1-3 TESTS: CPT

## 2022-01-26 PROCEDURE — 87641 MR-STAPH DNA AMP PROBE: CPT

## 2022-01-26 PROCEDURE — 87040 BLOOD CULTURE FOR BACTERIA: CPT

## 2022-01-26 PROCEDURE — 96375 TX/PRO/DX INJ NEW DRUG ADDON: CPT

## 2022-01-26 PROCEDURE — 95700 EEG CONT REC W/VID EEG TECH: CPT

## 2022-01-26 PROCEDURE — C9399: CPT

## 2022-01-26 PROCEDURE — 84540 ASSAY OF URINE/UREA-N: CPT

## 2022-01-26 PROCEDURE — 84484 ASSAY OF TROPONIN QUANT: CPT

## 2022-01-26 PROCEDURE — 82435 ASSAY OF BLOOD CHLORIDE: CPT

## 2022-01-26 PROCEDURE — 87045 FECES CULTURE AEROBIC BACT: CPT

## 2022-01-26 PROCEDURE — 80061 LIPID PANEL: CPT

## 2022-01-26 PROCEDURE — 87116 MYCOBACTERIA CULTURE: CPT

## 2022-01-26 PROCEDURE — 85730 THROMBOPLASTIN TIME PARTIAL: CPT

## 2022-01-26 PROCEDURE — 87046 STOOL CULTR AEROBIC BACT EA: CPT

## 2022-01-26 PROCEDURE — 93306 TTE W/DOPPLER COMPLETE: CPT

## 2022-01-26 PROCEDURE — 71250 CT THORAX DX C-: CPT

## 2022-01-26 PROCEDURE — 84157 ASSAY OF PROTEIN OTHER: CPT

## 2022-01-26 PROCEDURE — 87493 C DIFF AMPLIFIED PROBE: CPT

## 2022-01-26 PROCEDURE — 86769 SARS-COV-2 COVID-19 ANTIBODY: CPT

## 2022-01-26 PROCEDURE — C1769: CPT

## 2022-01-26 PROCEDURE — 87070 CULTURE OTHR SPECIMN AEROBIC: CPT

## 2022-01-26 PROCEDURE — 97535 SELF CARE MNGMENT TRAINING: CPT

## 2022-01-26 PROCEDURE — 70496 CT ANGIOGRAPHY HEAD: CPT

## 2022-01-26 PROCEDURE — 84300 ASSAY OF URINE SODIUM: CPT

## 2022-01-26 PROCEDURE — 97163 PT EVAL HIGH COMPLEX 45 MIN: CPT

## 2022-01-26 PROCEDURE — C1713: CPT

## 2022-01-26 PROCEDURE — 89051 BODY FLUID CELL COUNT: CPT

## 2022-01-26 PROCEDURE — 36430 TRANSFUSION BLD/BLD COMPNT: CPT

## 2022-01-26 PROCEDURE — 87507 IADNA-DNA/RNA PROBE TQ 12-25: CPT

## 2022-01-26 PROCEDURE — 87205 SMEAR GRAM STAIN: CPT

## 2022-01-26 PROCEDURE — 36228 PLACE CATH INTRACRANIAL ART: CPT

## 2022-01-26 PROCEDURE — 82947 ASSAY GLUCOSE BLOOD QUANT: CPT

## 2022-01-26 PROCEDURE — 82570 ASSAY OF URINE CREATININE: CPT

## 2022-01-26 PROCEDURE — G0103: CPT

## 2022-01-26 PROCEDURE — 82803 BLOOD GASES ANY COMBINATION: CPT

## 2022-01-26 PROCEDURE — 87184 SC STD DISK METHOD PER PLATE: CPT

## 2022-01-26 PROCEDURE — L8699: CPT

## 2022-01-26 PROCEDURE — 97530 THERAPEUTIC ACTIVITIES: CPT

## 2022-01-26 PROCEDURE — 71045 X-RAY EXAM CHEST 1 VIEW: CPT

## 2022-01-26 PROCEDURE — U0003: CPT

## 2022-01-26 PROCEDURE — 80076 HEPATIC FUNCTION PANEL: CPT

## 2022-01-26 PROCEDURE — 83935 ASSAY OF URINE OSMOLALITY: CPT

## 2022-01-26 PROCEDURE — 36415 COLL VENOUS BLD VENIPUNCTURE: CPT

## 2022-01-26 PROCEDURE — 85610 PROTHROMBIN TIME: CPT

## 2022-01-26 PROCEDURE — 82565 ASSAY OF CREATININE: CPT

## 2022-01-26 PROCEDURE — 0042T: CPT

## 2022-01-26 PROCEDURE — 36227 PLACE CATH XTRNL CAROTID: CPT

## 2022-01-26 PROCEDURE — 99232 SBSQ HOSP IP/OBS MODERATE 35: CPT

## 2022-01-26 PROCEDURE — 81001 URINALYSIS AUTO W/SCOPE: CPT

## 2022-01-26 PROCEDURE — C1889: CPT

## 2022-01-26 PROCEDURE — 96376 TX/PRO/DX INJ SAME DRUG ADON: CPT

## 2022-01-26 PROCEDURE — 80053 COMPREHEN METABOLIC PANEL: CPT

## 2022-01-26 PROCEDURE — 99285 EMERGENCY DEPT VISIT HI MDM: CPT

## 2022-01-26 PROCEDURE — U0005: CPT

## 2022-01-26 PROCEDURE — 95711 VEEG 2-12 HR UNMONITORED: CPT

## 2022-01-26 PROCEDURE — 93886 INTRACRANIAL COMPLETE STUDY: CPT

## 2022-01-26 PROCEDURE — 74018 RADEX ABDOMEN 1 VIEW: CPT

## 2022-01-26 PROCEDURE — 86403 PARTICLE AGGLUT ANTBDY SCRN: CPT

## 2022-01-26 PROCEDURE — 97112 NEUROMUSCULAR REEDUCATION: CPT

## 2022-01-26 PROCEDURE — 94640 AIRWAY INHALATION TREATMENT: CPT

## 2022-01-26 PROCEDURE — 94003 VENT MGMT INPAT SUBQ DAY: CPT

## 2022-01-26 PROCEDURE — 93970 EXTREMITY STUDY: CPT

## 2022-01-26 PROCEDURE — 93308 TTE F-UP OR LMTD: CPT

## 2022-01-26 PROCEDURE — C1887: CPT

## 2022-01-26 PROCEDURE — 88112 CYTOPATH CELL ENHANCE TECH: CPT

## 2022-01-26 PROCEDURE — P9016: CPT

## 2022-01-26 PROCEDURE — 85520 HEPARIN ASSAY: CPT

## 2022-01-26 PROCEDURE — 85027 COMPLETE CBC AUTOMATED: CPT

## 2022-01-26 PROCEDURE — 36226 PLACE CATH VERTEBRAL ART: CPT

## 2022-01-26 PROCEDURE — 85018 HEMOGLOBIN: CPT

## 2022-01-26 PROCEDURE — 82962 GLUCOSE BLOOD TEST: CPT

## 2022-01-26 PROCEDURE — 97129 THER IVNTJ 1ST 15 MIN: CPT

## 2022-01-26 PROCEDURE — 92523 SPEECH SOUND LANG COMPREHEN: CPT

## 2022-01-26 PROCEDURE — 84100 ASSAY OF PHOSPHORUS: CPT

## 2022-01-26 PROCEDURE — 87102 FUNGUS ISOLATION CULTURE: CPT

## 2022-01-26 PROCEDURE — 87186 SC STD MICRODIL/AGAR DIL: CPT

## 2022-01-26 PROCEDURE — 71260 CT THORAX DX C+: CPT

## 2022-01-26 PROCEDURE — 86850 RBC ANTIBODY SCREEN: CPT

## 2022-01-26 PROCEDURE — 97110 THERAPEUTIC EXERCISES: CPT

## 2022-01-26 PROCEDURE — C1894: CPT

## 2022-01-26 PROCEDURE — 86923 COMPATIBILITY TEST ELECTRIC: CPT

## 2022-01-26 PROCEDURE — 70553 MRI BRAIN STEM W/O & W/DYE: CPT

## 2022-01-26 PROCEDURE — 86803 HEPATITIS C AB TEST: CPT

## 2022-01-26 PROCEDURE — 0225U NFCT DS DNA&RNA 21 SARSCOV2: CPT

## 2022-01-26 PROCEDURE — 85014 HEMATOCRIT: CPT

## 2022-01-26 PROCEDURE — 36224 PLACE CATH CAROTD ART: CPT

## 2022-01-26 PROCEDURE — 95714 VEEG EA 12-26 HR UNMNTR: CPT

## 2022-01-26 PROCEDURE — 83735 ASSAY OF MAGNESIUM: CPT

## 2022-01-26 PROCEDURE — 87324 CLOSTRIDIUM AG IA: CPT

## 2022-01-26 PROCEDURE — 99239 HOSP IP/OBS DSCHRG MGMT >30: CPT

## 2022-01-26 PROCEDURE — 86900 BLOOD TYPING SEROLOGIC ABO: CPT

## 2022-01-26 PROCEDURE — 70498 CT ANGIOGRAPHY NECK: CPT | Mod: MA

## 2022-01-26 PROCEDURE — 97167 OT EVAL HIGH COMPLEX 60 MIN: CPT

## 2022-01-26 PROCEDURE — 84443 ASSAY THYROID STIM HORMONE: CPT

## 2022-01-26 PROCEDURE — 97760 ORTHOTIC MGMT&TRAING 1ST ENC: CPT

## 2022-01-26 RX ORDER — DULAGLUTIDE 4.5 MG/.5ML
1 INJECTION, SOLUTION SUBCUTANEOUS
Qty: 0 | Refills: 0 | DISCHARGE

## 2022-01-26 RX ORDER — ASPIRIN/CALCIUM CARB/MAGNESIUM 324 MG
1 TABLET ORAL
Qty: 0 | Refills: 0 | DISCHARGE
Start: 2022-01-26

## 2022-01-26 RX ORDER — APIXABAN 2.5 MG/1
1 TABLET, FILM COATED ORAL
Qty: 0 | Refills: 0 | DISCHARGE
Start: 2022-01-26

## 2022-01-26 RX ORDER — VANCOMYCIN HCL 1 G
5 VIAL (EA) INTRAVENOUS
Qty: 18 | Refills: 0
Start: 2022-01-26 | End: 2022-02-01

## 2022-01-26 RX ORDER — PIOGLITAZONE HYDROCHLORIDE 15 MG/1
1 TABLET ORAL
Qty: 0 | Refills: 0 | DISCHARGE

## 2022-01-26 RX ORDER — ASPIRIN/CALCIUM CARB/MAGNESIUM 324 MG
1 TABLET ORAL
Qty: 0 | Refills: 0 | DISCHARGE

## 2022-01-26 RX ORDER — ACETYLCYSTEINE 200 MG/ML
4 VIAL (ML) MISCELLANEOUS
Qty: 0 | Refills: 0 | DISCHARGE
Start: 2022-01-26

## 2022-01-26 RX ORDER — IPRATROPIUM/ALBUTEROL SULFATE 18-103MCG
3 AEROSOL WITH ADAPTER (GRAM) INHALATION
Qty: 0 | Refills: 0 | DISCHARGE
Start: 2022-01-26

## 2022-01-26 RX ORDER — PANTOPRAZOLE SODIUM 20 MG/1
1 TABLET, DELAYED RELEASE ORAL
Qty: 0 | Refills: 0 | DISCHARGE
Start: 2022-01-26

## 2022-01-26 RX ORDER — METOPROLOL TARTRATE 50 MG
12.5 TABLET ORAL
Qty: 0 | Refills: 0 | DISCHARGE
Start: 2022-01-26

## 2022-01-26 RX ORDER — PSYLLIUM SEED (WITH DEXTROSE)
1 POWDER (GRAM) ORAL
Qty: 0 | Refills: 0 | DISCHARGE
Start: 2022-01-26

## 2022-01-26 RX ORDER — CLOPIDOGREL BISULFATE 75 MG/1
1 TABLET, FILM COATED ORAL
Qty: 0 | Refills: 0 | DISCHARGE

## 2022-01-26 RX ORDER — ATORVASTATIN CALCIUM 80 MG/1
1 TABLET, FILM COATED ORAL
Qty: 0 | Refills: 0 | DISCHARGE
Start: 2022-01-26

## 2022-01-26 RX ORDER — DOXAZOSIN MESYLATE 4 MG
1 TABLET ORAL
Qty: 0 | Refills: 0 | DISCHARGE
Start: 2022-01-26

## 2022-01-26 RX ORDER — METFORMIN HYDROCHLORIDE 850 MG/1
1 TABLET ORAL
Qty: 0 | Refills: 0 | DISCHARGE

## 2022-01-26 RX ORDER — METOPROLOL TARTRATE 50 MG
1 TABLET ORAL
Qty: 0 | Refills: 0 | DISCHARGE

## 2022-01-26 RX ORDER — CHLORTHALIDONE 50 MG
1 TABLET ORAL
Qty: 0 | Refills: 0 | DISCHARGE

## 2022-01-26 RX ORDER — BENAZEPRIL HYDROCHLORIDE 40 MG/1
1 TABLET ORAL
Qty: 0 | Refills: 0 | DISCHARGE

## 2022-01-26 RX ORDER — ATORVASTATIN CALCIUM 80 MG/1
1 TABLET, FILM COATED ORAL
Qty: 0 | Refills: 0 | DISCHARGE

## 2022-01-26 RX ADMIN — PANTOPRAZOLE SODIUM 40 MILLIGRAM(S): 20 TABLET, DELAYED RELEASE ORAL at 12:53

## 2022-01-26 RX ADMIN — Medication 12.5 MILLIGRAM(S): at 05:37

## 2022-01-26 RX ADMIN — Medication 4 MILLILITER(S): at 05:38

## 2022-01-26 RX ADMIN — Medication 81 MILLIGRAM(S): at 12:53

## 2022-01-26 RX ADMIN — Medication 125 MILLIGRAM(S): at 14:42

## 2022-01-26 RX ADMIN — Medication 1 DROP(S): at 05:39

## 2022-01-26 RX ADMIN — CHLORHEXIDINE GLUCONATE 15 MILLILITER(S): 213 SOLUTION TOPICAL at 05:38

## 2022-01-26 RX ADMIN — HUMAN INSULIN 12 UNIT(S): 100 INJECTION, SUSPENSION SUBCUTANEOUS at 12:24

## 2022-01-26 RX ADMIN — Medication 4 MILLILITER(S): at 12:25

## 2022-01-26 RX ADMIN — Medication 3 MILLILITER(S): at 12:53

## 2022-01-26 RX ADMIN — SODIUM CHLORIDE 3 MILLILITER(S): 9 INJECTION INTRAMUSCULAR; INTRAVENOUS; SUBCUTANEOUS at 12:27

## 2022-01-26 RX ADMIN — Medication 1 DROP(S): at 12:25

## 2022-01-26 RX ADMIN — Medication 1 TABLET(S): at 12:53

## 2022-01-26 RX ADMIN — Medication 3 MILLILITER(S): at 05:38

## 2022-01-26 RX ADMIN — APIXABAN 5 MILLIGRAM(S): 2.5 TABLET, FILM COATED ORAL at 05:37

## 2022-01-26 RX ADMIN — SODIUM CHLORIDE 3 MILLILITER(S): 9 INJECTION INTRAMUSCULAR; INTRAVENOUS; SUBCUTANEOUS at 05:38

## 2022-01-26 RX ADMIN — Medication 1 PACKET(S): at 12:27

## 2022-01-26 RX ADMIN — Medication 2 MILLIGRAM(S): at 05:39

## 2022-01-26 NOTE — CHART NOTE - NSCHARTNOTEFT_GEN_A_CORE
61M with PMH of HTN, CAD s/p stent on DAPT, T2DM who complained of headache and subsequently became unresponsive found to have posterior fossa hemorrhage, IVH, hydrocephalus, s/p EVD and SOC on 11/4. 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 Course c/b respiratory failure and dysphagia s/p trach/PEG (11/19), VPS (11/23), completed course of cefepime for enterobacter and pseudomonas pneumonia on 11/21, fevers, c. diff colitis, urinary retention, b/l distal DVT. New fevers again on 12/22 with diarrhea found to have recurrent c. diff infection on PO vanco. Trach dislodged again on 12/31, replaced by ENT, c/b small left pneumothorax, Patient treated for aspiration PNA and Cdiff, fevers and diarrhea resolved     Patient seen and examined at bedside, remains at baseline mental status, opens eyes to verbal stimuli   - patient with copious secretions, continues to need frequent suctioning  - completed IV Cefepime and flagyl for possible aspiration pneumonia 5/5 days on 1/8  - follow up blood cultures NGTD  - aspiration precaution  - on Vanco taper via PEG for c diff  - noted to have low grade temp and pyuria/urine cx colonization expected which is expected with teixeira, ID was reconsulted rec patient is clinically stable, continue with current vanco taper, no need for further workup and abx.   - ID recommending continue PO vancomycin slow taper, 125 tid x 1 week, 125 BID x 1 week, 125 daily x 1 week, and 125 every other day x 2 weeks  - c/w Banatrol QID for loose stool, Cholestyramine(will decrease frequency) and c/w lomotil   - c/w lomotil to tid  - judicious use of abx therapy as may exacerbate c. diff.  - Hb 7.1 will give 1 unit PRBC prior to d/c, no obvious source of bleeding, there is presumed anemia of chronic disease given no obvious bleeding in agreement with previous hospitalist.  - continue trach care and suctioning  - continue nebs and chest PT if able  - MICHELLE and Hyponatremia resolved s/p IVF  - DVT: c/w Eliquis   - c/w ASA and statin (transaminitis resolved)  Patient has a bladder lesion:  - as per urology - findings concerning for bladder tumor vs prostate gland. cysto transurethral resection would require general anesthesia. recommended checking urine cytology to determine next steps which returned negative. will still need outpatient urology follow-up regardless once acute issues improve  - PSA normal, urine cytology negative for malignancy  - previously spoke to the patient's wife, Sandrita Bauer [phone]. Given the patient's underlying comorbid conditions coupled with his recent devastating neurologic events, other medical complications that ensued, and his poor neurologic and functional status at this time, it may be prudent to monitor his progress and see if he makes any meaningful recovery over the coming weeks to months to decide whether the benefit of pursuing any invasive work up for the bladder lesion outweighs the risk. Wife in agreement.  - Will require eventual outpatient f/u with Urology.    Patient is medically stable for d/c to [facility] today. d/w wife over the phone and CM. Time spent 40 min   d/w medicine JOSE RAMON Snyder

## 2022-01-26 NOTE — PROGRESS NOTE ADULT - REASON FOR ADMISSION
ICH
IVH/SAH
IVH/SAH
bleed
brain bleed
cdad
dysphagia
Cerebellar heme
Cerebellar heme
ICH
IVH/SAH
IVH/SAH
Posterior fossa hemorrhage
brain bleed
brain bleed
cdad
cerebellar bleed
ICH
IVH/SAH
IVH/SAH
brain bleed
brain bleed
diarrhea
ICH
IVH/SAH
diarrhea
AMS
ICH
IVH/SAH
Patient was admitted with intra cranial hemorrhage.
dvt
DVT
IVH/SAH
Posterior fossa hemorrhage
AMS
Posterior fossa hemorrhage
ICH
Posterior fossa hemorrhage

## 2022-01-26 NOTE — PROGRESS NOTE ADULT - SUBJECTIVE AND OBJECTIVE BOX
[facility]-- WOUND TEAM -- FOLLOW UP NOTE  --------------------------------------------------------------------------------    24 hour events/subjective:    tolerating TF  incontinent  afebrile  C diff resolving  pt for dc to [facility]      Diet:  Diet, NPO with Tube Feed:   Tube Feeding Modality: Gastrostomy  Glucerna 1.2 Blake (GLUCERNARTH)  Total Volume for 24 Hours (mL): 1800  Continuous  Until Goal Tube Feed Rate (mL per Hour): 75  Tube Feed Duration (in Hours): 24  Tube Feed Start Time: 12:00  Free Water Flush  Bolus   Total Volume per Flush (mL): 250   Frequency: Every 6 Hours  Charly(7 Gm Arginine/7 Gm Glut/1.2 Gm HMB     Qty per Day:  2  Banatrol TF     Qty per Day:  4  Supplement Feeding Modality:  Gastrostomy  Probiotic Yogurt/Smoothie Cans or Servings Per Day:  2       Frequency:  Daily (01-16-22 @ 16:23)      ROS: pt unable to offer    ALLERGIES & MEDICATIONS  --------------------------------------------------------------------------------  Allergies  penicillin (Other)      STANDING INPATIENT MEDICATIONS  acetylcysteine 20%  Inhalation 4 milliLiter(s) Inhalation every 6 hours  albuterol/ipratropium for Nebulization 3 milliLiter(s) Nebulizer every 6 hours  apixaban 5 milliGRAM(s) Enteral Tube every 12 hours  artificial  tears Solution 1 Drop(s) Both EYES every 6 hours  aspirin  chewable 81 milliGRAM(s) Oral daily  atorvastatin 40 milliGRAM(s) Oral at bedtime  chlorhexidine 0.12% Liquid 15 milliLiter(s) Oral Mucosa two times a day  dextrose 50% Injectable 25 Gram(s) IV Push once  dextrose 50% Injectable 12.5 Gram(s) IV Push once  doxazosin 2 milliGRAM(s) Oral daily  insulin lispro (ADMELOG) corrective regimen sliding scale   SubCutaneous every 6 hours  insulin NPH human recombinant 12 Unit(s) SubCutaneous every 6 hours  metoprolol tartrate 12.5 milliGRAM(s) Oral two times a day  multivitamin 1 Tablet(s) Oral daily  pantoprazole  Injectable 40 milliGRAM(s) IV Push daily  psyllium Powder 1 Packet(s) Oral daily  sodium chloride 3%  Inhalation 3 milliLiter(s) Inhalation every 6 hours  vancomycin    Solution 125 milliGRAM(s) Oral daily      PRN INPATIENT MEDICATION  acetaminophen    Suspension .. 650 milliGRAM(s) Enteral Tube every 6 hours PRN        VITALS/PHYSICAL EXAM  --------------------------------------------------------------------------------  T(C): 37 (01-26-22 @ 11:56), Max: 37 (01-26-22 @ 07:42)  HR: 114 (01-26-22 @ 11:56) (92 - 114)  BP: 136/70 (01-26-22 @ 11:56) (112/64 - 138/78)  RR: 18 (01-26-22 @ 11:56) (18 - 18)  SpO2: 98% (01-26-22 @ 11:56) (97% - 100%)  Wt(kg): --        01-25-22 @ 07:01  -  01-26-22 @ 07:00  --------------------------------------------------------  IN: 1275 mL / OUT: 2150 mL / NET: -875 mL      Physical Exam:  General: NAD frail  Versa Care P500  HEENT: NC/AT, PERRLA, mucosa moist, trach collar, neck supple  Gastrointestinal: soft NT/ND (+)PEG   : (+)Barney  Neurology: nonverbal, not follow commands  Musculoskeletal: no contractures  Vascular: BLE edema equal, equally warm  Skin: moist w/ good turgor   Sacral/Lt buttocks 4cm x 4cm x 0.1cm w/ denuded and partial thickness skin          loss w/ 5% evolving DTI remaining         scant serosanguinous drainage,         periwound skin is intact w/o blistering - no longer macerated and erythematous.     Bilateral groins intact pale erythema w/o drainage or blistering  No odor, increased warmth, tenderness, induration, fluctuance          LABS/ CULTURES/ RADIOLOGY:              7.1    8.22  >-----------<  411      [01-26-22 @ 06:45]              22.7     137  |  98  |  18  ----------------------------<  74      [01-26-22 @ 06:42]  4.1   |  29  |  0.55        Ca     8.7     [01-26-22 @ 06:42]      CAPILLARY BLOOD GLUCOSE  POCT Blood Glucose.: 137 mg/dL (26 Jan 2022 11:47)  POCT Blood Glucose.: 82 mg/dL (26 Jan 2022 05:23)  POCT Blood Glucose.: 111 mg/dL (25 Jan 2022 23:22)  POCT Blood Glucose.: 127 mg/dL (25 Jan 2022 17:19)      A1C with Estimated Average Glucose Result: 6.5 % (11-04-21 @ 21:43)

## 2022-01-26 NOTE — PROGRESS NOTE ADULT - SUBJECTIVE AND OBJECTIVE BOX
CC: f/u for low grade fever    Patient reports: he is non verbal but temps have been normal x 24 hours.    REVIEW OF SYSTEMS:  All other review of systems negative (Comprehensive ROS): limited, tolerating enteral feeds, stable on TC, no diarrhea    Antimicrobials Day #  :taper  vancomycin    Solution 125 milliGRAM(s) Oral daily    Other Medications Reviewed    T(F): 98.6 (22 @ 11:56), Max: 98.6 (22 @ 07:42)  HR: 114 (22 @ 11:56)  BP: 136/70 (22 @ 11:56)  RR: 18 (22 @ 11:56)  SpO2: 98% (22 @ 11:56)  Wt(kg): --    PHYSICAL EXAM:  General: awake, no acute distress  Eyes:  anicteric, no conjunctival injection, no discharge  Oropharynx: no lesions or injection 	  Neck: supple, trach  Lungs: few ronchi  Heart: regular rate and rhythm; no murmur, rubs or gallops  Abdomen: soft, nondistended, nontender, peg  Skin: no lesions  Extremities: no clubbing, cyanosis, or edema  Neurologic: awake, poorly interactive    LAB RESULTS:                        7.1    8.22  )-----------( 411      ( 2022 06:45 )             22.7         137  |  98  |  18  ----------------------------<  74  4.1   |  29  |  0.55    Ca    8.7      2022 06:42        Urinalysis Basic - ( 2022 11:08 )    Color: Light Yellow / Appearance: Clear / S.014 / pH: x  Gluc: x / Ketone: Negative  / Bili: Negative / Urobili: Negative   Blood: x / Protein: Trace / Nitrite: Positive   Leuk Esterase: Large / RBC: 17 /hpf / WBC 52 /HPF   Sq Epi: x / Non Sq Epi: 0 /hpf / Bacteria: Moderate      MICROBIOLOGY:  RECENT CULTURES:      RADIOLOGY REVIEWED:    < from: Xray Chest 1 View- PORTABLE-Urgent (Xray Chest 1 View- PORTABLE-Urgent .) (22 @ 18:25) >  IMPRESSION:  Linear density at the lung bases may represent atelectasis. It is   improved from the prior study.    --- End of Report ---    < end of copied text >

## 2022-01-26 NOTE — PROGRESS NOTE ADULT - SUBJECTIVE AND OBJECTIVE BOX
Patient is a 61y Male     Patient is a 61y old  Male who presents with a chief complaint of ICH (25 Jan 2022 17:04)      HPI:  61M hx HTN, DM, cardiac stent on ASA. At work complaining of HA ~8:30, starting feeling dizzy and vomiting, HTN/keke when EMS got to him. SBP 220s, HR 50s.     On EMS arrival at 09:39AM 11/4/21, patient seen talking a little, garbling, moving all extremities, then quickly became unresponsive. No known blood thinners.    CTH shows large posterior fossa bleed w/ IVH/SAH/hydro.    Exam:  Pre/intubation exam: no eyes open, pupils 2b/l, + corneals/cough/gag, not FC, LUE spont fingers moving, flacid otherwise    ICU Vital Signs Last 24 Hrs  T(C): 34.8 (04 Nov 2021 12:00), Max: 34.8 (04 Nov 2021 12:00)  T(F): 94.6 (04 Nov 2021 12:00), Max: 94.6 (04 Nov 2021 12:00)  HR: 127 (04 Nov 2021 11:30) (109 - 146)  BP: 123/78 (04 Nov 2021 11:30) (123/78 - 245/142)  BP(mean): --  ABP: --  ABP(mean): --  RR: 19 (04 Nov 2021 11:30) (19 - 30)  SpO2: 100% (04 Nov 2021 11:30) (100% - 100%)  11-04    140  |  103  |  18  ----------------------------<  226<H>  3.3<L>   |  19<L>  |  1.24    Ca    9.5      04 Nov 2021 10:16    TPro  7.8  /  Alb  4.8  /  TBili  0.4  /  DBili  x   /  AST  18  /  ALT  14  /  AlkPhos  94  11-04                        12.9   15.29 )-----------( 322      ( 04 Nov 2021 10:16 )             40.5    (04 Nov 2021 12:28)      PAST MEDICAL & SURGICAL HISTORY:  HTN (hypertension)    Diabetes mellitus        MEDICATIONS  (STANDING):  acetylcysteine 20%  Inhalation 4 milliLiter(s) Inhalation every 6 hours  albuterol/ipratropium for Nebulization 3 milliLiter(s) Nebulizer every 6 hours  apixaban 5 milliGRAM(s) Enteral Tube every 12 hours  artificial  tears Solution 1 Drop(s) Both EYES every 6 hours  aspirin  chewable 81 milliGRAM(s) Oral daily  atorvastatin 40 milliGRAM(s) Oral at bedtime  chlorhexidine 0.12% Liquid 15 milliLiter(s) Oral Mucosa two times a day  dextrose 50% Injectable 25 Gram(s) IV Push once  dextrose 50% Injectable 12.5 Gram(s) IV Push once  doxazosin 2 milliGRAM(s) Oral daily  insulin lispro (ADMELOG) corrective regimen sliding scale   SubCutaneous every 6 hours  insulin NPH human recombinant 12 Unit(s) SubCutaneous every 6 hours  metoprolol tartrate 12.5 milliGRAM(s) Oral two times a day  multivitamin 1 Tablet(s) Oral daily  pantoprazole  Injectable 40 milliGRAM(s) IV Push daily  psyllium Powder 1 Packet(s) Oral daily  sodium chloride 3%  Inhalation 3 milliLiter(s) Inhalation every 6 hours  vancomycin    Solution 125 milliGRAM(s) Oral daily      Allergies    penicillin (Other)    Intolerances        SOCIAL HISTORY:  Denies ETOh,Smoking,     FAMILY HISTORY:      REVIEW OF SYSTEMS:    CONSTITUTIONAL: No weakness, fevers or chills  EYES/ENT: No visual changes;  No vertigo or throat pain   NECK: No pain or stiffness  RESPIRATORY: No cough, wheezing, hemoptysis; No shortness of breath  CARDIOVASCULAR: No chest pain or palpitations  GASTROINTESTINAL: No abdominal or epigastric pain. No nausea, vomiting, or hematemesis; No diarrhea or constipation. No melena or hematochezia.  GENITOURINARY: No dysuria, frequency or hematuria  NEUROLOGICAL: No numbness or weakness  SKIN: No itching, burning, rashes, or lesions   All other review of systems is negative unless indicated above.    VITAL:  T(C): , Max: 37.6 (01-25-22 @ 12:30)  T(F): , Max: 99.7 (01-25-22 @ 12:30)  HR: 98 (01-26-22 @ 07:42)  BP: 114/78 (01-26-22 @ 07:42)  BP(mean): --  RR: 18 (01-26-22 @ 07:42)  SpO2: 98% (01-26-22 @ 07:42)  Wt(kg): --    I and O's:    01-24 @ 07:01  -  01-25 @ 07:00  --------------------------------------------------------  IN: 1000 mL / OUT: 1051 mL / NET: -51 mL    01-25 @ 07:01  -  01-26 @ 07:00  --------------------------------------------------------  IN: 1275 mL / OUT: 2150 mL / NET: -875 mL          PHYSICAL EXAM:    Constitutional: NAD  HEENT: PERRLA,   Neck: No JVD  Respiratory: CTA B/L  Cardiovascular: S1 and S2  Gastrointestinal: BS+, soft, NT/ND  Extremities: No peripheral edema  Neurological: A/O x 3, no focal deficits  Psychiatric: Normal mood, normal affect  : No Barney  Skin: No rashes  Access: Not applicable  Back: No CVA tenderness    LABS:                        7.1    8.22  )-----------( 411      ( 26 Jan 2022 06:45 )             22.7     01-26    137  |  98  |  18  ----------------------------<  74  4.1   |  29  |  0.55    Ca    8.7      26 Jan 2022 06:42            RADIOLOGY & ADDITIONAL STUDIES:

## 2022-01-26 NOTE — CHART NOTE - NSCHARTNOTESELECT_GEN_ALL_CORE
ENT/Event Note
Event Note
Hgb drop/Event Note
Interventional Neuro Radiology/Event Note
Interventional Neuro Radiology/Event Note
Neurosurgery/Event Note
Nutrition Services
Palliative Medicine/Event Note
EEG/Epilepsy
EVD removal/Event Note
Event Note
Interventional Neuro Radiology/Event Note
Interventional Neuro Radiology/Event Note
Neurosurgery Shunt Check/Event Note
Nutrition Services
TCD
TCD
Thoracic/Off Service Note
VTE Risk/Event Note
hospitalist discharge note
palliative care/Event Note
starting AC/Event Note

## 2022-01-26 NOTE — PROGRESS NOTE ADULT - ASSESSMENT
A/p 61M with PMH of HTN, CAD s/p stent on DAPT, T2DM who complained of headache and subsequently became unresponsive found to have posterior fossa hemorrhage, IVH, hydrocephalus, s/p EVD and SOC on 11/4. 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 Course c/b respiratory failure and dysphagia s/p trach/PEG (11/19), VPS (11/23), completed course of cefepime for enterobacter and pseudomonas pneumonia on 11/21, fevers, c. diff colitis, urinary retention, b/l distal DVT. New fevers again on 12/22 with diarrhea found to have recurrent c. diff infection.    Wound Consult to assist w/ management of   Sacral/bilateral Buttocks evolving deep tissue injury  Incontinence of bowel  Incontinence Dermatitis      1.) sacral/buttock injury -Allevyn or TRIAD paste       Bilateral groins - InterdryAg after cleansing   2.) Incontinence Management - continue pericare BID, w/ assist of attends underpads        improving diarrhea- improving w/ probiotic, banatrol, & senna  3.) Maintain on an alternating air with low air loss surface  4.) Turn and reposition Q 2 hours  5.) Nutrition optimization w/ Moderate Malnutrition, Increased Nutrient Needs w/ TF, vit c, mvi, luz marina, & probiotics  6.) Hyperglycemia improving w/ glucerna TF and NPH insulin w/ FS & ISS  7.) Abx as per ID for c diff  8.) Offload heels/feet with complete cair air fluidized boots; ensure that the soles of the feet are not resting on the foot board of the bed.  Care as per medicine, Will continue to follow with you  Upon discharge f/u as outpatient at [facility] [address] [facility] [phone]  d/w team & RN  Rosario Gongora [place]-C, [place] [id number]  I spent 25mnutes face to face w/ this pt of which more than 50% of the time was spent counseling & coordinating care of this pt.    A/p 61M with PMH of HTN, CAD s/p stent on DAPT, T2DM who complained of headache and subsequently became unresponsive found to have posterior fossa hemorrhage, IVH, hydrocephalus, s/p EVD and SOC on 11/4. 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 Course c/b respiratory failure and dysphagia s/p trach/PEG (11/19), VPS (11/23), completed course of cefepime for enterobacter and pseudomonas pneumonia on 11/21, fevers, c. diff colitis, urinary retention, b/l distal DVT. New fevers again on 12/22 with diarrhea found to have recurrent c. diff infection.    Wound Consult to assist w/ management of   Sacral/bilateral Buttocks evolving deep tissue injury, prpbable stage 2  Incontinence of bowel  Incontinence Dermatitis      1.) sacral/buttock injury -Allevyn or TRIAD paste       Bilateral groins - Interdry Ag after cleansing   2.) Incontinence Management - continue pericare BID, w/ assist of attends underpads        improving diarrhea- improving w/ probiotic, banatrol, & senna  3.) Maintain on an alternating air with low air loss surface  4.) Turn and reposition Q 2 hours  5.) Nutrition optimization w/ Moderate Malnutrition, Increased Nutrient Needs w/ TF, vit c, mvi, luz marina, & probiotics  6.) Hyperglycemia improving w/ glucerna TF and NPH insulin w/ FS & ISS  7.) Abx as per ID for c diff  8.) Offload heels/feet with complete cair air fluidized boots; ensure that the soles of the feet are not resting on the foot board of the bed.  Care as per medicine, Will continue to follow with you  Upon discharge f/u as outpatient at [facility] [address] [facility] [phone]  d/w team & RN  Rosario Gongora PA-C, [place] [id number]  I spent 25mnutes face to face w/ this pt of which more than 50% of the time was spent counseling & coordinating care of this pt.

## 2022-01-26 NOTE — PROGRESS NOTE ADULT - NUTRITIONAL ASSESSMENT
This patient has been assessed with a concern for Malnutrition and has been determined to have a diagnosis/diagnoses of Moderate protein-calorie malnutrition.    This patient is being managed with:   Diet NPO with Tube Feed-  Tube Feeding Modality: Gastrostomy  Glucerna 1.2 Blake (GLUCERNARTH)  Total Volume for 24 Hours (mL): 1800  Continuous  Until Goal Tube Feed Rate (mL per Hour): 75  Tube Feed Duration (in Hours): 24  Tube Feed Start Time: 12:00  Free Water Flush  Bolus   Total Volume per Flush (mL): 250   Frequency: Every 6 Hours  Charly(7 Gm Arginine/7 Gm Glut/1.2 Gm HMB     Qty per Day:  2  Banatrol TF     Qty per Day:  2  Supplement Feeding Modality:  Gastrostomy  Probiotic Yogurt/Smoothie Cans or Servings Per Day:  2       Frequency:  Daily  Entered: Nima  3 2022 12:58PM    
This patient has been assessed with a concern for Malnutrition and has been determined to have a diagnosis/diagnoses of Moderate protein-calorie malnutrition.    This patient is being managed with:   Diet NPO with Tube Feed-  Tube Feeding Modality: Gastrostomy  Glucerna 1.2 Blake (GLUCERNARTH)  Total Volume for 24 Hours (mL): 1800  Continuous  Until Goal Tube Feed Rate (mL per Hour): 75  Tube Feed Duration (in Hours): 24  Tube Feed Start Time: 12:00  Free Water Flush  Bolus   Total Volume per Flush (mL): 250   Frequency: Every 6 Hours  Charly(7 Gm Arginine/7 Gm Glut/1.2 Gm HMB     Qty per Day:  2  Banatrol TF     Qty per Day:  2  Supplement Feeding Modality:  Gastrostomy  Probiotic Yogurt/Smoothie Cans or Servings Per Day:  2       Frequency:  Daily  Entered: Nima  3 2022 12:58PM    
This patient has been assessed with a concern for Malnutrition and has been determined to have a diagnosis/diagnoses of Moderate protein-calorie malnutrition.    This patient is being managed with:   Diet NPO with Tube Feed-  Tube Feeding Modality: Gastrostomy  Glucerna 1.2 Blake (GLUCERNARTH)  Total Volume for 24 Hours (mL): 1800  Continuous  Until Goal Tube Feed Rate (mL per Hour): 75  Tube Feed Duration (in Hours): 24  Tube Feed Start Time: 12:00  Free Water Flush  Bolus   Total Volume per Flush (mL): 250   Frequency: Every 6 Hours  Charly(7 Gm Arginine/7 Gm Glut/1.2 Gm HMB     Qty per Day:  2  Banatrol TF     Qty per Day:  4  Supplement Feeding Modality:  Gastrostomy  Probiotic Yogurt/Smoothie Cans or Servings Per Day:  2       Frequency:  Daily  Entered: Jan 16 2022  4:23PM    
This patient has been assessed with a concern for Malnutrition and has been determined to have a diagnosis/diagnoses of Moderate protein-calorie malnutrition.    This patient is being managed with:   Diet NPO with Tube Feed-  Tube Feeding Modality: Gastrostomy  Glucerna 1.2 Blake (GLUCERNARTH)  Total Volume for 24 Hours (mL): 1800  Continuous  Until Goal Tube Feed Rate (mL per Hour): 75  Tube Feed Duration (in Hours): 24  Tube Feed Start Time: 12:00  Free Water Flush  Bolus   Total Volume per Flush (mL): 250   Frequency: Every 6 Hours  Charly(7 Gm Arginine/7 Gm Glut/1.2 Gm HMB     Qty per Day:  2  Banatrol TF     Qty per Day:  4  Supplement Feeding Modality:  Gastrostomy  Probiotic Yogurt/Smoothie Cans or Servings Per Day:  2       Frequency:  Daily  Entered: Jan 16 2022  4:23PM    
This patient has been assessed with a concern for Malnutrition and has been determined to have a diagnosis/diagnoses of Moderate protein-calorie malnutrition.    This patient is being managed with:   Diet NPO with Tube Feed-  Tube Feeding Modality: Gastrostomy  Glucerna 1.2 Blake (GLUCERNARTH)  Total Volume for 24 Hours (mL): 1800  Continuous  Until Goal Tube Feed Rate (mL per Hour): 75  Tube Feed Duration (in Hours): 24  Tube Feed Start Time: 12:00  Free Water Flush  Bolus   Total Volume per Flush (mL): 250   Frequency: Every 6 Hours  Charly(7 Gm Arginine/7 Gm Glut/1.2 Gm HMB     Qty per Day:  2  Supplement Feeding Modality:  Gastrostomy  Probiotic Yogurt/Smoothie Cans or Servings Per Day:  2       Frequency:  Daily  Entered: Dec 10 2021  1:38PM    
This patient has been assessed with a concern for Malnutrition and has been determined to have a diagnosis/diagnoses of Moderate protein-calorie malnutrition.    This patient is being managed with:   Diet NPO with Tube Feed-  Tube Feeding Modality: Gastrostomy  Glucerna 1.2 Blake (GLUCERNARTH)  Total Volume for 24 Hours (mL): 1800  Continuous  Until Goal Tube Feed Rate (mL per Hour): 75  Tube Feed Duration (in Hours): 24  Tube Feed Start Time: 12:00  Free Water Flush  Bolus   Total Volume per Flush (mL): 250   Frequency: Every 6 Hours  Charly(7 Gm Arginine/7 Gm Glut/1.2 Gm HMB     Qty per Day:  2  Banatrol TF     Qty per Day:  2  Supplement Feeding Modality:  Gastrostomy  Probiotic Yogurt/Smoothie Cans or Servings Per Day:  2       Frequency:  Daily  Entered: Nima  3 2022 12:58PM    
This patient has been assessed with a concern for Malnutrition and has been determined to have a diagnosis/diagnoses of Moderate protein-calorie malnutrition.    This patient is being managed with:   Diet NPO with Tube Feed-  Tube Feeding Modality: Gastrostomy  Glucerna 1.2 Blake (GLUCERNARTH)  Total Volume for 24 Hours (mL): 1800  Continuous  Until Goal Tube Feed Rate (mL per Hour): 75  Tube Feed Duration (in Hours): 24  Tube Feed Start Time: 12:00  Free Water Flush  Bolus   Total Volume per Flush (mL): 250   Frequency: Every 6 Hours  Charly(7 Gm Arginine/7 Gm Glut/1.2 Gm HMB     Qty per Day:  2  Supplement Feeding Modality:  Gastrostomy  Probiotic Yogurt/Smoothie Cans or Servings Per Day:  2       Frequency:  Daily  Entered: Dec 10 2021  1:38PM    
This patient has been assessed with a concern for Malnutrition and has been determined to have a diagnosis/diagnoses of Moderate protein-calorie malnutrition.    This patient is being managed with:   Diet NPO-  Tube Feeding Modality: Nasogastric  Glucerna 1.2 Blake (GLUCERNARTH)  Total Volume for 24 Hours (mL): 1440  Continuous  Starting Tube Feed Rate {mL per Hour}: 20  Increase Tube Feed Rate by (mL): 10     Every 4 hours  Until Goal Tube Feed Rate (mL per Hour): 60  Tube Feed Duration (in Hours): 24  Tube Feed Start Time: 10:00  Free Water Flush  Bolus   Total Volume per Flush (mL): 250   Frequency: Every 6 Hours  Entered: Dec  8 2021 12:11PM    
This patient has been assessed with a concern for Malnutrition and has been determined to have a diagnosis/diagnoses of Moderate protein-calorie malnutrition.    This patient is being managed with:   Diet NPO with Tube Feed-  Tube Feeding Modality: Gastrostomy  Glucerna 1.2 Blake (GLUCERNARTH)  Total Volume for 24 Hours (mL): 1800  Continuous  Until Goal Tube Feed Rate (mL per Hour): 75  Tube Feed Duration (in Hours): 24  Tube Feed Start Time: 12:00  Free Water Flush  Bolus   Total Volume per Flush (mL): 250   Frequency: Every 6 Hours  Charly(7 Gm Arginine/7 Gm Glut/1.2 Gm HMB     Qty per Day:  2  Banatrol TF     Qty per Day:  2  Supplement Feeding Modality:  Gastrostomy  Probiotic Yogurt/Smoothie Cans or Servings Per Day:  2       Frequency:  Daily  Entered: Nima  3 2022 12:58PM    
This patient has been assessed with a concern for Malnutrition and has been determined to have a diagnosis/diagnoses of Moderate protein-calorie malnutrition.    This patient is being managed with:   Diet NPO with Tube Feed-  Tube Feeding Modality: Gastrostomy  Glucerna 1.2 Blake (GLUCERNARTH)  Total Volume for 24 Hours (mL): 1800  Continuous  Until Goal Tube Feed Rate (mL per Hour): 75  Tube Feed Duration (in Hours): 24  Tube Feed Start Time: 12:00  Free Water Flush  Bolus   Total Volume per Flush (mL): 250   Frequency: Every 6 Hours  Charly(7 Gm Arginine/7 Gm Glut/1.2 Gm HMB     Qty per Day:  2  Banatrol TF     Qty per Day:  4  Supplement Feeding Modality:  Gastrostomy  Probiotic Yogurt/Smoothie Cans or Servings Per Day:  2       Frequency:  Daily  Entered: Jan 16 2022  4:23PM    
This patient has been assessed with a concern for Malnutrition and has been determined to have a diagnosis/diagnoses of Moderate protein-calorie malnutrition.    This patient is being managed with:   Diet NPO with Tube Feed-  Tube Feeding Modality: Gastrostomy  Glucerna 1.2 Blake (GLUCERNARTH)  Total Volume for 24 Hours (mL): 1800  Continuous  Until Goal Tube Feed Rate (mL per Hour): 75  Tube Feed Duration (in Hours): 24  Tube Feed Start Time: 12:00  Free Water Flush  Bolus   Total Volume per Flush (mL): 250   Frequency: Every 6 Hours  Charly(7 Gm Arginine/7 Gm Glut/1.2 Gm HMB     Qty per Day:  2  Banatrol TF     Qty per Day:  4  Supplement Feeding Modality:  Gastrostomy  Probiotic Yogurt/Smoothie Cans or Servings Per Day:  2       Frequency:  Daily  Entered: Jan 16 2022  4:23PM    
This patient has been assessed with a concern for Malnutrition and has been determined to have a diagnosis/diagnoses of Moderate protein-calorie malnutrition.    This patient is being managed with:   Diet NPO with Tube Feed-  Tube Feeding Modality: Gastrostomy  Glucerna 1.2 Blake (GLUCERNARTH)  Total Volume for 24 Hours (mL): 1800  Continuous  Until Goal Tube Feed Rate (mL per Hour): 75  Tube Feed Duration (in Hours): 24  Tube Feed Start Time: 12:00  Free Water Flush  Bolus   Total Volume per Flush (mL): 250   Frequency: Every 6 Hours  Charly(7 Gm Arginine/7 Gm Glut/1.2 Gm HMB     Qty per Day:  2  Supplement Feeding Modality:  Gastrostomy  Probiotic Yogurt/Smoothie Cans or Servings Per Day:  2       Frequency:  Daily  Entered: Dec 10 2021  1:38PM    
This patient has been assessed with a concern for Malnutrition and has been determined to have a diagnosis/diagnoses of Moderate protein-calorie malnutrition.    This patient is being managed with:   Diet NPO with Tube Feed-  Tube Feeding Modality: Gastrostomy  Glucerna 1.2 Blake (GLUCERNARTH)  Total Volume for 24 Hours (mL): 1800  Continuous  Until Goal Tube Feed Rate (mL per Hour): 75  Tube Feed Duration (in Hours): 24  Tube Feed Start Time: 12:00  Free Water Flush  Bolus   Total Volume per Flush (mL): 250   Frequency: Every 6 Hours  Charly(7 Gm Arginine/7 Gm Glut/1.2 Gm HMB     Qty per Day:  2  Banatrol TF     Qty per Day:  2  Supplement Feeding Modality:  Gastrostomy  Probiotic Yogurt/Smoothie Cans or Servings Per Day:  2       Frequency:  Daily  Entered: Nima  3 2022 12:58PM    
This patient has been assessed with a concern for Malnutrition and has been determined to have a diagnosis/diagnoses of Moderate protein-calorie malnutrition.    This patient is being managed with:   Diet NPO with Tube Feed-  Tube Feeding Modality: Gastrostomy  Glucerna 1.2 Blake (GLUCERNARTH)  Total Volume for 24 Hours (mL): 1800  Continuous  Until Goal Tube Feed Rate (mL per Hour): 75  Tube Feed Duration (in Hours): 24  Tube Feed Start Time: 12:00  Free Water Flush  Bolus   Total Volume per Flush (mL): 250   Frequency: Every 6 Hours  Charly(7 Gm Arginine/7 Gm Glut/1.2 Gm HMB     Qty per Day:  2  Banatrol TF     Qty per Day:  4  Supplement Feeding Modality:  Gastrostomy  Probiotic Yogurt/Smoothie Cans or Servings Per Day:  2       Frequency:  Daily  Entered: Jan 16 2022  4:23PM    
This patient has been assessed with a concern for Malnutrition and has been determined to have a diagnosis/diagnoses of Moderate protein-calorie malnutrition.    This patient is being managed with:   Diet NPO with Tube Feed-  Tube Feeding Modality: Gastrostomy  Glucerna 1.2 Blake (GLUCERNARTH)  Total Volume for 24 Hours (mL): 1800  Continuous  Until Goal Tube Feed Rate (mL per Hour): 75  Tube Feed Duration (in Hours): 24  Tube Feed Start Time: 12:00  Free Water Flush  Bolus   Total Volume per Flush (mL): 250   Frequency: Every 6 Hours  Charly(7 Gm Arginine/7 Gm Glut/1.2 Gm HMB     Qty per Day:  2  Supplement Feeding Modality:  Gastrostomy  Probiotic Yogurt/Smoothie Cans or Servings Per Day:  2       Frequency:  Daily  Entered: Dec 10 2021  1:38PM    
This patient has been assessed with a concern for Malnutrition and has been determined to have a diagnosis/diagnoses of Moderate protein-calorie malnutrition.    This patient is being managed with:   Diet NPO with Tube Feed-  Tube Feeding Modality: Gastrostomy  Glucerna 1.2 Blake (GLUCERNARTH)  Total Volume for 24 Hours (mL): 1800  Continuous  Until Goal Tube Feed Rate (mL per Hour): 75  Tube Feed Duration (in Hours): 24  Tube Feed Start Time: 12:00  Free Water Flush  Bolus   Total Volume per Flush (mL): 250   Frequency: Every 6 Hours  Charly(7 Gm Arginine/7 Gm Glut/1.2 Gm HMB     Qty per Day:  2  Banatrol TF     Qty per Day:  4  Supplement Feeding Modality:  Gastrostomy  Probiotic Yogurt/Smoothie Cans or Servings Per Day:  2       Frequency:  Daily  Entered: Jan 16 2022  4:23PM    
This patient has been assessed with a concern for Malnutrition and has been determined to have a diagnosis/diagnoses of Moderate protein-calorie malnutrition.    This patient is being managed with:   Diet NPO with Tube Feed-  Tube Feeding Modality: Gastrostomy  Glucerna 1.2 Blake (GLUCERNARTH)  Total Volume for 24 Hours (mL): 1800  Continuous  Until Goal Tube Feed Rate (mL per Hour): 75  Tube Feed Duration (in Hours): 24  Tube Feed Start Time: 12:00  Free Water Flush  Bolus   Total Volume per Flush (mL): 250   Frequency: Every 6 Hours  Charly(7 Gm Arginine/7 Gm Glut/1.2 Gm HMB     Qty per Day:  2  Banatrol TF     Qty per Day:  4  Supplement Feeding Modality:  Gastrostomy  Probiotic Yogurt/Smoothie Cans or Servings Per Day:  2       Frequency:  Daily  Entered: Jan 16 2022  4:23PM    
This patient has been assessed with a concern for Malnutrition and has been determined to have a diagnosis/diagnoses of Moderate protein-calorie malnutrition.    This patient is being managed with:   Diet NPO with Tube Feed-  Tube Feeding Modality: Gastrostomy  Glucerna 1.2 Blake (GLUCERNARTH)  Total Volume for 24 Hours (mL): 1800  Continuous  Until Goal Tube Feed Rate (mL per Hour): 75  Tube Feed Duration (in Hours): 24  Tube Feed Start Time: 12:00  Free Water Flush  Bolus   Total Volume per Flush (mL): 250   Frequency: Every 6 Hours  Charly(7 Gm Arginine/7 Gm Glut/1.2 Gm HMB     Qty per Day:  2  Supplement Feeding Modality:  Gastrostomy  Probiotic Yogurt/Smoothie Cans or Servings Per Day:  2       Frequency:  Daily  Entered: Dec 10 2021  1:38PM    
This patient has been assessed with a concern for Malnutrition and has been determined to have a diagnosis/diagnoses of Moderate protein-calorie malnutrition.    This patient is being managed with:   Diet NPO with Tube Feed-  Tube Feeding Modality: Gastrostomy  Glucerna 1.2 Blake (GLUCERNARTH)  Total Volume for 24 Hours (mL): 1800  Continuous  Until Goal Tube Feed Rate (mL per Hour): 75  Tube Feed Duration (in Hours): 24  Tube Feed Start Time: 12:00  Free Water Flush  Bolus   Total Volume per Flush (mL): 250   Frequency: Every 6 Hours  Charly(7 Gm Arginine/7 Gm Glut/1.2 Gm HMB     Qty per Day:  2  Banatrol TF     Qty per Day:  2  Supplement Feeding Modality:  Gastrostomy  Probiotic Yogurt/Smoothie Cans or Servings Per Day:  2       Frequency:  Daily  Entered: Nima  3 2022 12:58PM    
This patient has been assessed with a concern for Malnutrition and has been determined to have a diagnosis/diagnoses of Moderate protein-calorie malnutrition.    This patient is being managed with:   Diet NPO with Tube Feed-  Tube Feeding Modality: Gastrostomy  Glucerna 1.2 Blake (GLUCERNARTH)  Total Volume for 24 Hours (mL): 1800  Continuous  Until Goal Tube Feed Rate (mL per Hour): 75  Tube Feed Duration (in Hours): 24  Tube Feed Start Time: 12:00  Free Water Flush  Bolus   Total Volume per Flush (mL): 250   Frequency: Every 6 Hours  Charly(7 Gm Arginine/7 Gm Glut/1.2 Gm HMB     Qty per Day:  2  Banatrol TF     Qty per Day:  2  Supplement Feeding Modality:  Gastrostomy  Probiotic Yogurt/Smoothie Cans or Servings Per Day:  2       Frequency:  Daily  Entered: Nima  3 2022 12:58PM    
This patient has been assessed with a concern for Malnutrition and has been determined to have a diagnosis/diagnoses of Moderate protein-calorie malnutrition.    This patient is being managed with:   Diet NPO with Tube Feed-  Tube Feeding Modality: Gastrostomy  Glucerna 1.2 Blake (GLUCERNARTH)  Total Volume for 24 Hours (mL): 1800  Continuous  Until Goal Tube Feed Rate (mL per Hour): 75  Tube Feed Duration (in Hours): 24  Tube Feed Start Time: 12:00  Free Water Flush  Bolus   Total Volume per Flush (mL): 250   Frequency: Every 6 Hours  Charly(7 Gm Arginine/7 Gm Glut/1.2 Gm HMB     Qty per Day:  2  Supplement Feeding Modality:  Gastrostomy  Probiotic Yogurt/Smoothie Cans or Servings Per Day:  2       Frequency:  Daily  Entered: Dec 10 2021  1:38PM    
This patient has been assessed with a concern for Malnutrition and has been determined to have a diagnosis/diagnoses of Moderate protein-calorie malnutrition.    This patient is being managed with:   Diet NPO with Tube Feed-  Tube Feeding Modality: Gastrostomy  Glucerna 1.2 Blake (GLUCERNARTH)  Total Volume for 24 Hours (mL): 1800  Continuous  Until Goal Tube Feed Rate (mL per Hour): 75  Tube Feed Duration (in Hours): 24  Tube Feed Start Time: 12:00  Free Water Flush  Bolus   Total Volume per Flush (mL): 250   Frequency: Every 6 Hours  Charly(7 Gm Arginine/7 Gm Glut/1.2 Gm HMB     Qty per Day:  2  Banatrol TF     Qty per Day:  4  Supplement Feeding Modality:  Gastrostomy  Probiotic Yogurt/Smoothie Cans or Servings Per Day:  2       Frequency:  Daily  Entered: Jan 16 2022  4:23PM    
This patient has been assessed with a concern for Malnutrition and has been determined to have a diagnosis/diagnoses of Moderate protein-calorie malnutrition.    This patient is being managed with:   Diet NPO with Tube Feed-  Tube Feeding Modality: Gastrostomy  Glucerna 1.2 Blake (GLUCERNARTH)  Total Volume for 24 Hours (mL): 1800  Continuous  Until Goal Tube Feed Rate (mL per Hour): 75  Tube Feed Duration (in Hours): 24  Tube Feed Start Time: 12:00  Free Water Flush  Bolus   Total Volume per Flush (mL): 250   Frequency: Every 6 Hours  Charly(7 Gm Arginine/7 Gm Glut/1.2 Gm HMB     Qty per Day:  2  Supplement Feeding Modality:  Gastrostomy  Probiotic Yogurt/Smoothie Cans or Servings Per Day:  2       Frequency:  Daily  Entered: Dec 10 2021  1:38PM    
This patient has been assessed with a concern for Malnutrition and has been determined to have a diagnosis/diagnoses of Moderate protein-calorie malnutrition.    This patient is being managed with:   Diet NPO with Tube Feed-  Tube Feeding Modality: Gastrostomy  Glucerna 1.2 Blake (GLUCERNARTH)  Total Volume for 24 Hours (mL): 1800  Continuous  Until Goal Tube Feed Rate (mL per Hour): 75  Tube Feed Duration (in Hours): 24  Tube Feed Start Time: 12:00  Free Water Flush  Bolus   Total Volume per Flush (mL): 250   Frequency: Every 6 Hours  Charly(7 Gm Arginine/7 Gm Glut/1.2 Gm HMB     Qty per Day:  2  Banatrol TF     Qty per Day:  2  Supplement Feeding Modality:  Gastrostomy  Probiotic Yogurt/Smoothie Cans or Servings Per Day:  2       Frequency:  Daily  Entered: Nima  3 2022 12:58PM    
This patient has been assessed with a concern for Malnutrition and has been determined to have a diagnosis/diagnoses of Moderate protein-calorie malnutrition.    This patient is being managed with:   Diet NPO with Tube Feed-  Tube Feeding Modality: Gastrostomy  Glucerna 1.2 Blake (GLUCERNARTH)  Total Volume for 24 Hours (mL): 1800  Continuous  Until Goal Tube Feed Rate (mL per Hour): 75  Tube Feed Duration (in Hours): 24  Tube Feed Start Time: 12:00  Free Water Flush  Bolus   Total Volume per Flush (mL): 250   Frequency: Every 6 Hours  Charly(7 Gm Arginine/7 Gm Glut/1.2 Gm HMB     Qty per Day:  2  Supplement Feeding Modality:  Gastrostomy  Probiotic Yogurt/Smoothie Cans or Servings Per Day:  2       Frequency:  Daily  Entered: Dec 10 2021  1:38PM    
This patient has been assessed with a concern for Malnutrition and has been determined to have a diagnosis/diagnoses of Moderate protein-calorie malnutrition.    This patient is being managed with:   Diet NPO with Tube Feed-  Tube Feeding Modality: Gastrostomy  Glucerna 1.2 Blake (GLUCERNARTH)  Total Volume for 24 Hours (mL): 1800  Continuous  Until Goal Tube Feed Rate (mL per Hour): 75  Tube Feed Duration (in Hours): 24  Tube Feed Start Time: 12:00  Free Water Flush  Bolus   Total Volume per Flush (mL): 250   Frequency: Every 6 Hours  Charly(7 Gm Arginine/7 Gm Glut/1.2 Gm HMB     Qty per Day:  2  Banatrol TF     Qty per Day:  2  Supplement Feeding Modality:  Gastrostomy  Probiotic Yogurt/Smoothie Cans or Servings Per Day:  2       Frequency:  Daily  Entered: Nima  3 2022 12:58PM    
This patient has been assessed with a concern for Malnutrition and has been determined to have a diagnosis/diagnoses of Moderate protein-calorie malnutrition.    This patient is being managed with:   Diet NPO with Tube Feed-  Tube Feeding Modality: Gastrostomy  Glucerna 1.2 Blake (GLUCERNARTH)  Total Volume for 24 Hours (mL): 1800  Continuous  Until Goal Tube Feed Rate (mL per Hour): 75  Tube Feed Duration (in Hours): 24  Tube Feed Start Time: 12:00  Free Water Flush  Bolus   Total Volume per Flush (mL): 250   Frequency: Every 6 Hours  Charly(7 Gm Arginine/7 Gm Glut/1.2 Gm HMB     Qty per Day:  2  Banatrol TF     Qty per Day:  4  Supplement Feeding Modality:  Gastrostomy  Probiotic Yogurt/Smoothie Cans or Servings Per Day:  2       Frequency:  Daily  Entered: Jan 16 2022  4:23PM    
This patient has been assessed with a concern for Malnutrition and has been determined to have a diagnosis/diagnoses of Moderate protein-calorie malnutrition.    This patient is being managed with:   Diet NPO with Tube Feed-  Tube Feeding Modality: Gastrostomy  Glucerna 1.2 Blake (GLUCERNARTH)  Total Volume for 24 Hours (mL): 1800  Continuous  Until Goal Tube Feed Rate (mL per Hour): 75  Tube Feed Duration (in Hours): 24  Tube Feed Start Time: 12:00  Free Water Flush  Bolus   Total Volume per Flush (mL): 250   Frequency: Every 6 Hours  Charly(7 Gm Arginine/7 Gm Glut/1.2 Gm HMB     Qty per Day:  2  Banatrol TF     Qty per Day:  2  Supplement Feeding Modality:  Gastrostomy  Probiotic Yogurt/Smoothie Cans or Servings Per Day:  2       Frequency:  Daily  Entered: Nima  3 2022 12:58PM    
This patient has been assessed with a concern for Malnutrition and has been determined to have a diagnosis/diagnoses of Moderate protein-calorie malnutrition.    This patient is being managed with:   Diet NPO with Tube Feed-  Tube Feeding Modality: Gastrostomy  Glucerna 1.2 Blake (GLUCERNARTH)  Total Volume for 24 Hours (mL): 1800  Continuous  Until Goal Tube Feed Rate (mL per Hour): 75  Tube Feed Duration (in Hours): 24  Tube Feed Start Time: 12:00  Free Water Flush  Bolus   Total Volume per Flush (mL): 250   Frequency: Every 6 Hours  Charly(7 Gm Arginine/7 Gm Glut/1.2 Gm HMB     Qty per Day:  2  Supplement Feeding Modality:  Gastrostomy  Probiotic Yogurt/Smoothie Cans or Servings Per Day:  2       Frequency:  Daily  Entered: Dec 10 2021  1:38PM    
This patient has been assessed with a concern for Malnutrition and has been determined to have a diagnosis/diagnoses of Moderate protein-calorie malnutrition.    This patient is being managed with:   Diet NPO with Tube Feed-  Tube Feeding Modality: Gastrostomy  Glucerna 1.2 Blake (GLUCERNARTH)  Total Volume for 24 Hours (mL): 1800  Continuous  Until Goal Tube Feed Rate (mL per Hour): 75  Tube Feed Duration (in Hours): 24  Tube Feed Start Time: 12:00  Free Water Flush  Bolus   Total Volume per Flush (mL): 250   Frequency: Every 6 Hours  Charly(7 Gm Arginine/7 Gm Glut/1.2 Gm HMB     Qty per Day:  2  Banatrol TF     Qty per Day:  2  Supplement Feeding Modality:  Gastrostomy  Probiotic Yogurt/Smoothie Cans or Servings Per Day:  2       Frequency:  Daily  Entered: Nima  3 2022 12:58PM    
This patient has been assessed with a concern for Malnutrition and has been determined to have a diagnosis/diagnoses of Moderate protein-calorie malnutrition.    This patient is being managed with:   Diet NPO with Tube Feed-  Tube Feeding Modality: Gastrostomy  Glucerna 1.2 Blake (GLUCERNARTH)  Total Volume for 24 Hours (mL): 1800  Continuous  Until Goal Tube Feed Rate (mL per Hour): 75  Tube Feed Duration (in Hours): 24  Tube Feed Start Time: 12:00  Free Water Flush  Bolus   Total Volume per Flush (mL): 250   Frequency: Every 6 Hours  Charly(7 Gm Arginine/7 Gm Glut/1.2 Gm HMB     Qty per Day:  2  Banatrol TF     Qty per Day:  4  Supplement Feeding Modality:  Gastrostomy  Probiotic Yogurt/Smoothie Cans or Servings Per Day:  2       Frequency:  Daily  Entered: Jan 16 2022  4:23PM    
This patient has been assessed with a concern for Malnutrition and has been determined to have a diagnosis/diagnoses of Moderate protein-calorie malnutrition.    This patient is being managed with:   Diet NPO with Tube Feed-  Tube Feeding Modality: Gastrostomy  Glucerna 1.2 Blake (GLUCERNARTH)  Total Volume for 24 Hours (mL): 1800  Continuous  Until Goal Tube Feed Rate (mL per Hour): 75  Tube Feed Duration (in Hours): 24  Tube Feed Start Time: 12:00  Free Water Flush  Bolus   Total Volume per Flush (mL): 250   Frequency: Every 6 Hours  Charly(7 Gm Arginine/7 Gm Glut/1.2 Gm HMB     Qty per Day:  2  Supplement Feeding Modality:  Gastrostomy  Probiotic Yogurt/Smoothie Cans or Servings Per Day:  2       Frequency:  Daily  Entered: Dec 10 2021  1:38PM    
This patient has been assessed with a concern for Malnutrition and has been determined to have a diagnosis/diagnoses of Moderate protein-calorie malnutrition.    This patient is being managed with:   Diet NPO with Tube Feed-  Tube Feeding Modality: Gastrostomy  Glucerna 1.2 Blake (GLUCERNARTH)  Total Volume for 24 Hours (mL): 1800  Continuous  Until Goal Tube Feed Rate (mL per Hour): 75  Tube Feed Duration (in Hours): 24  Tube Feed Start Time: 12:00  Free Water Flush  Bolus   Total Volume per Flush (mL): 250   Frequency: Every 6 Hours  Charly(7 Gm Arginine/7 Gm Glut/1.2 Gm HMB     Qty per Day:  2  Banatrol TF     Qty per Day:  2  Supplement Feeding Modality:  Gastrostomy  Probiotic Yogurt/Smoothie Cans or Servings Per Day:  2       Frequency:  Daily  Entered: Nima  3 2022 12:58PM    
This patient has been assessed with a concern for Malnutrition and has been determined to have a diagnosis/diagnoses of Moderate protein-calorie malnutrition.    This patient is being managed with:   Diet NPO with Tube Feed-  Tube Feeding Modality: Gastrostomy  Glucerna 1.2 Blake (GLUCERNARTH)  Total Volume for 24 Hours (mL): 1800  Continuous  Until Goal Tube Feed Rate (mL per Hour): 75  Tube Feed Duration (in Hours): 24  Tube Feed Start Time: 12:00  Free Water Flush  Bolus   Total Volume per Flush (mL): 250   Frequency: Every 6 Hours  Charly(7 Gm Arginine/7 Gm Glut/1.2 Gm HMB     Qty per Day:  2  Banatrol TF     Qty per Day:  4  Supplement Feeding Modality:  Gastrostomy  Probiotic Yogurt/Smoothie Cans or Servings Per Day:  2       Frequency:  Daily  Entered: Jan 16 2022  4:23PM    
This patient has been assessed with a concern for Malnutrition and has been determined to have a diagnosis/diagnoses of Moderate protein-calorie malnutrition.    This patient is being managed with:   Diet NPO with Tube Feed-  Tube Feeding Modality: Gastrostomy  Glucerna 1.2 Blake (GLUCERNARTH)  Total Volume for 24 Hours (mL): 1800  Continuous  Until Goal Tube Feed Rate (mL per Hour): 75  Tube Feed Duration (in Hours): 24  Tube Feed Start Time: 12:00  Free Water Flush  Bolus   Total Volume per Flush (mL): 250   Frequency: Every 6 Hours  Charly(7 Gm Arginine/7 Gm Glut/1.2 Gm HMB     Qty per Day:  2  Supplement Feeding Modality:  Gastrostomy  Probiotic Yogurt/Smoothie Cans or Servings Per Day:  2       Frequency:  Daily  Entered: Dec 10 2021  1:38PM    
This patient has been assessed with a concern for Malnutrition and has been determined to have a diagnosis/diagnoses of Moderate protein-calorie malnutrition.    This patient is being managed with:   Diet NPO with Tube Feed-  Tube Feeding Modality: Gastrostomy  Glucerna 1.2 Blake (GLUCERNARTH)  Total Volume for 24 Hours (mL): 1800  Continuous  Until Goal Tube Feed Rate (mL per Hour): 75  Tube Feed Duration (in Hours): 24  Tube Feed Start Time: 12:00  Free Water Flush  Bolus   Total Volume per Flush (mL): 250   Frequency: Every 6 Hours  Charly(7 Gm Arginine/7 Gm Glut/1.2 Gm HMB     Qty per Day:  2  Supplement Feeding Modality:  Gastrostomy  Probiotic Yogurt/Smoothie Cans or Servings Per Day:  2       Frequency:  Daily  Entered: Dec 10 2021  1:38PM    
This patient has been assessed with a concern for Malnutrition and has been determined to have a diagnosis/diagnoses of Moderate protein-calorie malnutrition.    This patient is being managed with:   Diet NPO with Tube Feed-  Tube Feeding Modality: Gastrostomy  Glucerna 1.2 Blake (GLUCERNARTH)  Total Volume for 24 Hours (mL): 1800  Continuous  Until Goal Tube Feed Rate (mL per Hour): 75  Tube Feed Duration (in Hours): 24  Tube Feed Start Time: 12:00  Free Water Flush  Bolus   Total Volume per Flush (mL): 250   Frequency: Every 6 Hours  Charly(7 Gm Arginine/7 Gm Glut/1.2 Gm HMB     Qty per Day:  2  Banatrol TF     Qty per Day:  2  Supplement Feeding Modality:  Gastrostomy  Probiotic Yogurt/Smoothie Cans or Servings Per Day:  2       Frequency:  Daily  Entered: Nima  3 2022 12:58PM    
This patient has been assessed with a concern for Malnutrition and has been determined to have a diagnosis/diagnoses of Moderate protein-calorie malnutrition.    This patient is being managed with:   Diet NPO with Tube Feed-  Tube Feeding Modality: Gastrostomy  Glucerna 1.2 Blake (GLUCERNARTH)  Total Volume for 24 Hours (mL): 1800  Continuous  Until Goal Tube Feed Rate (mL per Hour): 75  Tube Feed Duration (in Hours): 24  Tube Feed Start Time: 12:00  Free Water Flush  Bolus   Total Volume per Flush (mL): 250   Frequency: Every 6 Hours  Charly(7 Gm Arginine/7 Gm Glut/1.2 Gm HMB     Qty per Day:  2  Banatrol TF     Qty per Day:  4  Supplement Feeding Modality:  Gastrostomy  Probiotic Yogurt/Smoothie Cans or Servings Per Day:  2       Frequency:  Daily  Entered: Jan 16 2022  4:23PM    

## 2022-01-26 NOTE — PROGRESS NOTE ADULT - ASSESSMENT
62 yo male with PMH of HTN   Admitted on 11/4 with severe headache and found to have cerebellar bleed.  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  S/p trach and peg.   He has distal DVT's - on apixaban.  He received cefepime 11/4-11/21 for respiratory coverage.  Developed watery C diff diarrhea on 11/26 - started on oral vancomycin   Then restarted on cefepime on 11/28 for concerns of pneumonia since had low grade temps. Giorgio neb added later  But CXR's remained clear.  Ct of brain showed residual blood.  Pseudomonas in sputum found to be resistant to carbapenems & quinolones. Sputum isolated strep pneumoniae as well    11/28 urine and blood cultures are negative.  Cefepime completed on 12/06 & Giorgio nebs on 12/07/21  Failed TOV & teixeira was replaced for retention of 1000 ml of urine on 12/06/21 PM  Completed treatment for C diff w 2 wks of oral vanco on 12/10/21  Diarrhea resolved  He developed recurrent fever and relapsed with C diff  Vanco restarted on 12/22  He had a rectal tube in place, it has been removed  At high risk for aspiration and  infections given history of retention and poor mental status.  His fecal output has decreased   complete two weeks po vancomycin now on taper , 125 daily.  Significance of low grade temp not clear.  He appears stable in general.  His respiratory status appears stable  Pyuria not unexpected with teixeira  He has remained afebrile for past 24 hours  Plan   1. Continue daily vanco, C diff appears controlled , perhaps we will advise  125 every other day  after 1 week of daily vanco   2.Follow clinically  3.If temps escalate will need blood cultures, CXR and additional w/u.Will try to be judicious with antibiotic use in this patient for a variety of reasons.  4.Given clinical stability there are no ID contraindications to discharge

## 2022-01-28 LAB
-  AMIKACIN: SIGNIFICANT CHANGE UP
-  AMOXICILLIN/CLAVULANIC ACID: SIGNIFICANT CHANGE UP
-  AMPICILLIN/SULBACTAM: SIGNIFICANT CHANGE UP
-  AMPICILLIN: SIGNIFICANT CHANGE UP
-  AZTREONAM: SIGNIFICANT CHANGE UP
-  CEFAZOLIN: SIGNIFICANT CHANGE UP
-  CEFEPIME: SIGNIFICANT CHANGE UP
-  CEFOXITIN: SIGNIFICANT CHANGE UP
-  CEFTRIAXONE: SIGNIFICANT CHANGE UP
-  CIPROFLOXACIN: SIGNIFICANT CHANGE UP
-  ERTAPENEM: SIGNIFICANT CHANGE UP
-  GENTAMICIN: SIGNIFICANT CHANGE UP
-  IMIPENEM: SIGNIFICANT CHANGE UP
-  LEVOFLOXACIN: SIGNIFICANT CHANGE UP
-  MEROPENEM: SIGNIFICANT CHANGE UP
-  NITROFURANTOIN: SIGNIFICANT CHANGE UP
-  PIPERACILLIN/TAZOBACTAM: SIGNIFICANT CHANGE UP
-  TIGECYCLINE: SIGNIFICANT CHANGE UP
-  TOBRAMYCIN: SIGNIFICANT CHANGE UP
-  TRIMETHOPRIM/SULFAMETHOXAZOLE: SIGNIFICANT CHANGE UP
CULTURE RESULTS: SIGNIFICANT CHANGE UP
METHOD TYPE: SIGNIFICANT CHANGE UP
ORGANISM # SPEC MICROSCOPIC CNT: SIGNIFICANT CHANGE UP
ORGANISM # SPEC MICROSCOPIC CNT: SIGNIFICANT CHANGE UP
SPECIMEN SOURCE: SIGNIFICANT CHANGE UP

## 2022-04-11 NOTE — PROGRESS NOTE ADULT - SUBJECTIVE AND OBJECTIVE BOX
Patient seen in clinic for warfarin management due to atrial fibrillation with an INR goal of 2.0-3.0. Estimated duration of therapy is indefinite. Patient states compliant all of the time with regimen. No bleeding or thromboembolic side effects noted. No significant med or dietary changes. No significant recent illness or disease state changes. PT/INR done in office per protocol. INR is 2.3 which is therapeutic. Warfarin regimen will be continued at current dose 6mg on Mon, Fri, and 3mg on all other days. Will retest in 4 weeks. Patient understands dosing directions and information discussed. Dosing schedule and follow up appointment given to patient. Progress note routed to referring physicians office. Discussed with patient the Pharmacist Collaborative Practice Agreement. Patient provided verbal and/or electronic (ex. OutSystemshart) consent to participate in the collaborative practice agreement between the pharmacist and referred patient. This is in lieu of paper consent due to COVID-19 precautions and the use of remote/virtual visits.        For Pharmacy Admin Tracking Only     Intervention Detail:    Total # of Interventions Recommended: 0   Total # of Interventions Accepted: 0   Time Spent (min): 15 Vascular Cardiology  Progress note  EMAIL [email or web address]     OFFICE [phone]    INTERVAL HISTORY:  -No acute events overnight.    Allergies  penicillin (Other)    MEDICATIONS:  apixaban 5 milliGRAM(s) Oral every 12 hours  labetalol 100 milliGRAM(s) Oral two times a day  cefepime   IVPB 2000 milliGRAM(s) IV Intermittent three times a day  tobramycin for Nebulization 300 milliGRAM(s) Inhalation every 12 hours  vancomycin    Solution 125 milliGRAM(s) Oral every 6 hours  sodium chloride 3%  Inhalation 3 milliLiter(s) Inhalation every 6 hours  acetaminophen    Suspension .. 650 milliGRAM(s) Oral every 6 hours PRN  atorvastatin 40 milliGRAM(s) Oral at bedtime  dextrose 50% Injectable 25 Gram(s) IV Push once  dextrose 50% Injectable 12.5 Gram(s) IV Push once  insulin lispro (ADMELOG) corrective regimen sliding scale   SubCutaneous every 6 hours  insulin NPH human recombinant 10 Unit(s) SubCutaneous every 6 hours  artificial  tears Solution 1 Drop(s) Both EYES every 6 hours  chlorhexidine 0.12% Liquid 15 milliLiter(s) Oral Mucosa two times a day  chlorhexidine 4% Liquid 1 Application(s) Topical <User Schedule>  nystatin Powder 1 Application(s) Topical two times a day    PAST MEDICAL & SURGICAL HISTORY:  HTN (hypertension)  Diabetes mellitus    FAMILY HISTORY:    SOCIAL HISTORY:  unchanged    REVIEW OF SYSTEMS:  CONSTITUTIONAL: No fever, weight loss, or fatigue  EYES: No eye pain, visual disturbances, or discharge  ENMT:  No difficulty hearing, tinnitus, vertigo; No sinus or throat pain  NECK: No pain or stiffness  RESPIRATORY: No cough, wheezing, chills or hemoptysis; No Shortness of Breath  CARDIOVASCULAR: No chest pain, palpitations, passing out, dizziness, or leg swelling  GASTROINTESTINAL: No abdominal or epigastric pain. No nausea, vomiting, or hematemesis; No diarrhea or constipation. No melena or hematochezia.  GENITOURINARY: No dysuria, frequency, hematuria, or incontinence  NEUROLOGICAL: No headaches, memory loss, loss of strength, numbness, or tremors  SKIN: No itching, burning, rashes, or lesions   LYMPH Nodes: No enlarged glands  ENDOCRINE: No heat or cold intolerance; No hair loss  MUSCULOSKELETAL: No joint pain or swelling; No muscle, back, or extremity pain  PSYCHIATRIC: No depression, anxiety, mood swings, or difficulty sleeping  HEME/LYMPH: No easy bruising, or bleeding gums  ALLERY AND IMMUNOLOGIC: No hives or eczema	    [x] All others negative	  [ ] Unable to obtain    PHYSICAL EXAM:  T(C): 37 (12-03-21 @ 08:47), Max: 37.3 (12-03-21 @ 05:39)  HR: 80 (12-03-21 @ 08:47) (76 - 93)  BP: 146/83 (12-03-21 @ 08:47) (120/68 - 146/83)  RR: 18 (12-03-21 @ 08:47) (18 - 18)  SpO2: 97% (12-03-21 @ 08:47) (95% - 100%)    Wt(kg): --  I&O's Summary    02 Dec 2021 07:01  -  03 Dec 2021 07:00  --------------------------------------------------------  IN: 0 mL / OUT: 1450 mL / NET: -1450 mL    Appearance: Frail appearing, nonverbal, with trach and PEG  HEENT:   Normal oral mucosa, PERRL, EOMI	  Lymphatic: No lymphadenopathy  Cardiovascular:  S1, S2, RRR, no RMG  Respiratory:  CTA b/l  Psychiatry:  Nonverbal; nods head upon command  Gastrointestinal:  Soft, Non-tender, + BS	  Skin: No rashes, No ecchymoses, No cyanosis	  Extremities:  B/l LE with no edema, asymmetry of girth, skin changes; extremities are warm with no ulcers or wounds, no phlegmasia    Vascular Pulse Exam:  Right DP: [x]palpable []non-palpable []audible      Left DP :   [x]palpable []non-palpable []audible  Right PT: [x]palpable [] non-palpable []audible   Left PT:  [x] palpable [] non-palpable []audible      LABS:	 	    CBC Full  -  ( 03 Dec 2021 10:27 )  WBC Count : 7.28 K/uL  Hemoglobin : 8.8 g/dL  Hematocrit : 28.3 %  Platelet Count - Automated : 427 K/uL  Mean Cell Volume : 96.6 fl  Mean Cell Hemoglobin : 30.0 pg  Mean Cell Hemoglobin Concentration : 31.1 gm/dL  Auto Neutrophil # : x  Auto Lymphocyte # : x  Auto Monocyte # : x  Auto Eosinophil # : x  Auto Basophil # : x  Auto Neutrophil % : x  Auto Lymphocyte % : x  Auto Monocyte % : x  Auto Eosinophil % : x  Auto Basophil % : x    12-03    133<L>  |  95<L>  |  32<H>  ----------------------------<  164<H>  4.9   |  29  |  0.93    Ca    8.9      03 Dec 2021 10:27    Assessment:  1. 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 EVD clamped & removed.  2. C. difficile diarrhea  --PCR positive. likely 2/2 to Abx use  3. Gram-negative pneumonia.   --s/p 7 day course of cefepime on 11/21; was started on levofloxacin for 11/24 sputum cx with pseudomonas  4. Respiratory failure.   --s/p trach. on trach collar  5. B/l below knee DVTs  --Right soleal vein DVT and persistent left posterior tibial, peroneal, gastrocnemius and soleal vein DVT without proximal propagation on last duplex on 11/24    8. CAD   --s/p stent and on DAPT  --ASA to be resumed when cleared by NSG; on statin    Plan:  1. Continue Eliquis 5mg PO BID for b/l below the knee DVTs  2. Continue serial b/l LE VA duplexes for DVT propagation surveillance; repeat duplex was ordered. Was deferred again yesterday. Will try again to get it today.    Thank you    Vascular Cardiology Service    Please call with any questions:   Service Line: [phone]  Office [phone]  email:  [email or web address]   Vascular Cardiology  Progress note  EMAIL [email or web address]     OFFICE [phone]    INTERVAL HISTORY:  -No acute events overnight.    Allergies  penicillin (Other)    MEDICATIONS:  apixaban 5 milliGRAM(s) Oral every 12 hours  labetalol 100 milliGRAM(s) Oral two times a day  cefepime   IVPB 2000 milliGRAM(s) IV Intermittent three times a day  tobramycin for Nebulization 300 milliGRAM(s) Inhalation every 12 hours  vancomycin    Solution 125 milliGRAM(s) Oral every 6 hours  sodium chloride 3%  Inhalation 3 milliLiter(s) Inhalation every 6 hours  acetaminophen    Suspension .. 650 milliGRAM(s) Oral every 6 hours PRN  atorvastatin 40 milliGRAM(s) Oral at bedtime  dextrose 50% Injectable 25 Gram(s) IV Push once  dextrose 50% Injectable 12.5 Gram(s) IV Push once  insulin lispro (ADMELOG) corrective regimen sliding scale   SubCutaneous every 6 hours  insulin NPH human recombinant 10 Unit(s) SubCutaneous every 6 hours  artificial  tears Solution 1 Drop(s) Both EYES every 6 hours  chlorhexidine 0.12% Liquid 15 milliLiter(s) Oral Mucosa two times a day  chlorhexidine 4% Liquid 1 Application(s) Topical <User Schedule>  nystatin Powder 1 Application(s) Topical two times a day    PAST MEDICAL & SURGICAL HISTORY:  HTN (hypertension)  Diabetes mellitus    FAMILY HISTORY:    SOCIAL HISTORY:  unchanged    REVIEW OF SYSTEMS:      [x ] Unable to obtain    PHYSICAL EXAM:  T(C): 37 (12-03-21 @ 08:47), Max: 37.3 (12-03-21 @ 05:39)  HR: 80 (12-03-21 @ 08:47) (76 - 93)  BP: 146/83 (12-03-21 @ 08:47) (120/68 - 146/83)  RR: 18 (12-03-21 @ 08:47) (18 - 18)  SpO2: 97% (12-03-21 @ 08:47) (95% - 100%)    Wt(kg): --  I&O's Summary    02 Dec 2021 07:01  -  03 Dec 2021 07:00  --------------------------------------------------------  IN: 0 mL / OUT: 1450 mL / NET: -1450 mL    Appearance: Frail appearing, nonverbal, with trach and PEG  HEENT:   Normal oral mucosa, PERRL, EOMI	  Lymphatic: No lymphadenopathy  Cardiovascular:  S1, S2, RRR, no RMG  Respiratory:  CTA b/l  Psychiatry:  Nonverbal; nods head upon command  Gastrointestinal:  Soft, Non-tender, + BS	  Skin: No rashes, No ecchymoses, No cyanosis	  Extremities:  B/l LE with no edema, asymmetry of girth, skin changes; extremities are warm with no ulcers or wounds, no phlegmasia    Vascular Pulse Exam:  Right DP: [x]palpable []non-palpable []audible      Left DP :   [x]palpable []non-palpable []audible  Right PT: [x]palpable [] non-palpable []audible   Left PT:  [x] palpable [] non-palpable []audible      LABS:	 	    CBC Full  -  ( 03 Dec 2021 10:27 )  WBC Count : 7.28 K/uL  Hemoglobin : 8.8 g/dL  Hematocrit : 28.3 %  Platelet Count - Automated : 427 K/uL  Mean Cell Volume : 96.6 fl  Mean Cell Hemoglobin : 30.0 pg  Mean Cell Hemoglobin Concentration : 31.1 gm/dL  Auto Neutrophil # : x  Auto Lymphocyte # : x  Auto Monocyte # : x  Auto Eosinophil # : x  Auto Basophil # : x  Auto Neutrophil % : x  Auto Lymphocyte % : x  Auto Monocyte % : x  Auto Eosinophil % : x  Auto Basophil % : x    12-03    133<L>  |  95<L>  |  32<H>  ----------------------------<  164<H>  4.9   |  29  |  0.93    Ca    8.9      03 Dec 2021 10:27    Assessment:  1. 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 EVD clamped & removed.  2. C. difficile diarrhea  --PCR positive. likely 2/2 to Abx use  3. Gram-negative pneumonia.   --s/p 7 day course of cefepime on 11/21; was started on levofloxacin for 11/24 sputum cx with pseudomonas  4. Respiratory failure.   --s/p trach. on trach collar  5. B/l below knee DVTs  --Right soleal vein DVT and persistent left posterior tibial, peroneal, gastrocnemius and soleal vein DVT without proximal propagation on last duplex on 11/24    8. CAD   --s/p stent and on DAPT  --ASA to be resumed when cleared by NSG; on statin    Plan:  1. Continue Eliquis 5mg PO BID for b/l below the knee DVTs  2. Continue serial b/l LE VA duplexes for DVT propagation surveillance; repeat duplex was ordered. Was deferred again yesterday. Will try again to get it today.    Thank you    Vascular Cardiology Service    Please call with any questions:   Service Line: [phone]  Office [phone]  email:  [email or web address]

## 2022-06-08 NOTE — PROGRESS NOTE ADULT - PROBLEM SELECTOR PLAN 3
I was present during the key portion of the procedure. resolved. On inhaled Tobramycin iv Cefepime. Pseudomonas and Strep Pneumoniae which grew from Sputum  ID is on the case   cont to monitor SPO2 and cont resp care

## 2022-10-03 NOTE — ED ADULT TRIAGE NOTE - CHIEF COMPLAINT QUOTE
Pt BIBA from [facility] for abnormal labs (BUN and creatinine). Pt trach to vent. hx candida aureus

## 2022-10-04 NOTE — H&P ADULT - HISTORY OF PRESENT ILLNESS
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 diff colitis  -hx of urinary retention with urology follow up on recent admission  -hx of b/l distal DVT  -hx lf L pneumothorax  -hx aspiration pneumonia    who was brought in by EMS from [facility] for evaluation of elevated Cr to 4.2 (baseline 0.9) and BUN to 120 (18 in 01/2022). No other complaints per NH documents. The patient is non verbal at baseline and does not track of follow commands at baseline. Pt has no teixeira catheter on arrival to ED.     In the ED, POC sono positive for large urinary distension >1.5 liters of urine by visual estimation. Pt found to have moderate hydro b/l kidneys, symmetric on POC sono. Teixeira 16F placed with output immediately of >1.3L urine, initially yellow then cloudy white purulent material, and then blood-tinged material/urine being output. Pt found to have full thickness sacral decub on exam 2 fists in diameter, nonpurulent, no discharge. Initial lab workup revealed Hgb 7.4 (7.1 on recent admission) and K 3.1 (repleted) with AG = 17. UA positive for large leukestrase. Pt is being admitted for MICHELLE and UTI.       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 diff colitis  -hx of urinary retention with urology follow up on recent admission  -hx of b/l distal DVT  -hx lf L pneumothorax  -hx aspiration pneumonia    who was brought in by EMS from [facility] for evaluation of elevated Cr to 4.2 (baseline 0.9) and BUN to 120 (18 in 01/2022). No other complaints per NH documents. The patient is non verbal at baseline and does not track of follow commands at baseline. Pt has no teixeira catheter on arrival to ED.     In the ED, POC sono positive for large urinary distension >1.5 liters of urine by visual estimation. Pt found to have moderate hydro b/l kidneys, symmetric on POC sono. Teixeira 16F placed with output immediately of >1.3L urine, initially yellow then cloudy white purulent material, and then blood-tinged material/urine being output. Pt found to have full thickness sacral decub on exam 2 fists in diameter, nonpurulent, no discharge. Initial lab workup revealed Hgb 7.4 (7.1 on recent admission) and K 3.1 (repleted) with AG = 17. UA positive for large leukestrase. Pt is being admitted for MICHELLE and UTI.

## 2022-10-04 NOTE — H&P ADULT - ATTENDING COMMENTS
62YOM with PMH of ICH sp trach/PEG, HTN/HLD, CAD, hx of c.diff who was brought in by EMS from [facility] for evaluation of elevated Cr to 4.2 (baseline 0.9) and BUN to 120 (18 in 01/2022). Pt was in acute urinary retention and is now sp teixeira catheter with >1.3L urine output    #MICHELLE, likely post-obstructive  #Urinary retention sp teixeira catheter  #Suspected UTI  UA 58 WBC, Large LE  - Cont monitoring BMP, and replete electrolytes for post-obstructive diuresis  - f/u UCX,BCx  - Cont ceftriaxone 1g q24h  - IVF    #Full thickness decubital ulcer  -nonpurulent, no discharge  -afebrile  -no elevation in WBC  -burn/wound team eval    #hx of hypotension  -BP on exam 130/70  -patient takes midodrine 5 q8hrs at home with holding parameters  -monitor BP    #T2DM  -continue lispro before meals and at bedtime  -monitor fingersticks     #HLD  -continue atorvastatin 80 daily     #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 Pt was in acute urinary retention and is now sp teixeira catheter with >1.3L urine output    #MICHELLE, likely post-obstructive  #Urinary retention sp teixeira catheter  #Suspected UTI  UA 58 WBC, Large LE  - Cont monitoring BMP, and replete electrolytes for post-obstructive diuresis  - f/u UCX,BCx  - Cont ceftriaxone 1g q24h  - IVF    #Full thickness decubital ulcer, present on admission  -nonpurulent, no discharge  -afebrile  -no elevation in WBC  -burn/wound team eval    #hx of hypotension  -BP on exam 130/70  -patient takes midodrine 5 q8hrs at home with holding parameters  -monitor BP    #T2DM  -continue lispro before meals and at bedtime  -monitor fingersticks     #HLD  -continue atorvastatin 80 daily     #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

## 2022-10-04 NOTE — ED PROVIDER NOTE - ATTENDING CONTRIBUTION TO CARE
pt sent from NH for elev bun, creat. pt trached, non verbal.   in nad, ctab, rrr, ab soft, nt.  leg bag/teixeira not draining.     full bladder on US with hydro b/l.   teixeira placed, large amt of UO.     will admit to hosp for MICHELLE.

## 2022-10-04 NOTE — CONSULT NOTE ADULT - SUBJECTIVE AND OBJECTIVE BOX
Patient is a 62y old  Male who presents with a chief complaint of MICHELLE (elevated BUN and Cr) (04 Oct 2022 08:20)      HPI:    62YOM with PMH of:   -posterior fossa hemorrhage in  complicated by interventricular hemorrhage and hydrocephalus, s/p EVD, SOC and VPS, s/p trach and PEG (complicated by trach dislodgment and replacement twice)  -HTN  -CAD s/p stent on DAPT  -T2DM  -PICA aneurysm s/p resection on   -hx recurrent c. diff colitis  -hx of urinary retention with urology follow up on recent admission  -hx of b/l distal DVT  -hx lf L pneumothorax  -hx aspiration pneumonia    who was brought in by EMS from [facility] for evaluation of elevated Cr to 4.2 (baseline 0.9) and BUN to 120 (18 in 2022). No other complaints per NH documents. The patient is non verbal at baseline and does not track of follow commands at baseline. Pt has no teixeira catheter on arrival to ED.     In the ED, POC sono positive for large urinary distension >1.5 liters of urine by visual estimation. Pt found to have moderate hydro b/l kidneys, symmetric on POC sono. Teixeira 16F placed with output immediately of >1.3L urine, initially yellow then cloudy white purulent material, and then blood-tinged material/urine being output. Pt found to have full thickness sacral decub on exam 2 fists in diameter, nonpurulent, no discharge. Initial lab workup revealed Hgb 7.4 (7.1 on recent admission) and K 3.1 (repleted) with AG = 17. UA positive for large leukestrase. Pt is being admitted for MICHELLE and UTI.     (04 Oct 2022 08:20)      PAST MEDICAL & SURGICAL HISTORY:  HTN (hypertension)      Diabetes mellitus          SOCIAL HX:   Smoking                         ETOH                            Other    FAMILY HISTORY:  :  No known cardiovacular family hisotry     Review Of Systems:     All ROS are negative except per HPI       Allergies    penicillin (Other)    Intolerances          PHYSICAL EXAM    ICU Vital Signs Last 24 Hrs  T(C): 36.3 (04 Oct 2022 07:33), Max: 36.7 (03 Oct 2022 21:59)  T(F): 97.3 (04 Oct 2022 07:33), Max: 98.1 (03 Oct 2022 21:59)  HR: 78 (04 Oct 2022 07:33) (78 - 91)  BP: 130/70 (04 Oct 2022 07:33) (121/66 - 138/80)  BP(mean): --  ABP: --  ABP(mean): --  RR: 17 (04 Oct 2022 07:33) (17 - 18)  SpO2: 100% (04 Oct 2022 07:33) (99% - 100%)    O2 Parameters below as of 04 Oct 2022 07:33  Patient On (Oxygen Delivery Method): ventilator            CONSTITUTIONAL:  Well nourished.  NAD    ENT:   Airway patent,   Mouth with normal mucosa.   No thrush    EYES:   pupils equal,   round and reactive to light.    CARDIAC:   Normal rate,   Regular rhythm.    Heart sounds S1, S2.   No edema      Vascular:   normal systolic impulse  no bruits    RESPIRATORY:   No wheezing   Normal chest expansion  No use of accessory muscles    GASTROINTESTINAL:  Abdomen soft   Non-tender,   No guarding,   + BS    GENITOURINARY  normal genitalia for sex  no edema    MUSCULOSKELETAL:   Range of motion is not limited,  Nno clubbing, cyanosis    NEUROLOGICAL:   Alert and oriented   No motor or sensory deficits.  Pertinent DTRs normal    SKIN:   Skin normal color for race,   Warm and dry  No evidence of rash.    PSYCHIATRIC:   Normal mood and affect.   No apparent risk to self or others.    HEME LYMPH:   No cervical  lymphadenopathy.  No inguinal lymphadenopathy            10-03-22 @ 07:01  -  10-04-22 @ 07:00  --------------------------------------------------------  IN:  Total IN: 0 mL    OUT:    Ureteral Catheter (mL): 1475 mL  Total OUT: 1475 mL    Total NET: -1475 mL      10-04-22 @ 07:01  -  10-04-22 @ 11:41  --------------------------------------------------------  IN:  Total IN: 0 mL    OUT:    Ureteral Catheter (mL): 2400 mL  Total OUT: 2400 mL    Total NET: -2400 mL          LABS:                          7.4    10.01 )-----------( 361      ( 04 Oct 2022 00:22 )             21.9                                               10-04    139  |  100  |  132<HH>  ----------------------------<  121<H>  3.0<L>   |  22  |  4.2<HH>    Ca    9.1      04 Oct 2022 00:22    TPro  6.4  /  Alb  2.9<L>  /  TBili  <0.2  /  DBili  x   /  AST  19  /  ALT  9   /  AlkPhos  104  10-04      PT/INR - ( 04 Oct 2022 04:28 )   PT: 12.00 sec;   INR: 1.05 ratio         PTT - ( 04 Oct 2022 04:28 )  PTT:25.4 sec                                       Urinalysis Basic - ( 04 Oct 2022 04:32 )    Color: Light Yellow / Appearance: Clear / S.010 / pH: x  Gluc: x / Ketone: Negative  / Bili: Negative / Urobili: <2 mg/dL   Blood: x / Protein: 30 mg/dL / Nitrite: Negative   Leuk Esterase: Large / RBC: 4 /HPF / WBC 58 /HPF   Sq Epi: x / Non Sq Epi: 7 /HPF / Bacteria: Negative                                                  LIVER FUNCTIONS - ( 04 Oct 2022 00:22 )  Alb: 2.9 g/dL / Pro: 6.4 g/dL / ALK PHOS: 104 U/L / ALT: 9 U/L / AST: 19 U/L / GGT: x                                                                                               Mode: AC/ CMV (Assist Control/ Continuous Mandatory Ventilation)  RR (machine): 15  TV (machine): 400  FiO2: 40  PEEP: 5  MAP: 7  PIP: 12                                          X-Rays reviewed:                                                                                    ECHO    CXR interpreted by me: small left sided pleural effusion    MEDICATIONS  (STANDING):  acetaminophen     Tablet .. 650 milliGRAM(s) Oral once  aspirin  chewable 81 milliGRAM(s) Oral daily  atorvastatin 80 milliGRAM(s) Oral at bedtime  cefTRIAXone   IVPB 1000 milliGRAM(s) IV Intermittent every 24 hours  dextrose 5%. 1000 milliLiter(s) (100 mL/Hr) IV Continuous <Continuous>  dextrose 5%. 1000 milliLiter(s) (50 mL/Hr) IV Continuous <Continuous>  dextrose 50% Injectable 25 Gram(s) IV Push once  dextrose 50% Injectable 12.5 Gram(s) IV Push once  dextrose 50% Injectable 25 Gram(s) IV Push once  ergocalciferol Drops 8000 Unit(s) Oral daily  famotidine  Oral Tab/Cap - Peds 20 milliGRAM(s) Enteral Tube two times a day  ferrous    sulfate Liquid 300 milliGRAM(s) Oral daily  folic acid 1 milliGRAM(s) Oral daily  glucagon  Injectable 1 milliGRAM(s) IntraMuscular once  heparin   Injectable 5000 Unit(s) SubCutaneous every 12 hours  insulin lispro (ADMELOG) corrective regimen sliding scale   SubCutaneous at bedtime  ipratropium 0.02% for Nebulization - Peds 500 MICROGram(s) Inhalation every 6 hours  lactated ringers. 1000 milliLiter(s) (100 mL/Hr) IV Continuous <Continuous>  levETIRAcetam 500 milliGRAM(s) Oral two times a day  midodrine. 5 milliGRAM(s) Oral three times a day  multivitamin 1 Tablet(s) Oral daily  potassium chloride  20 mEq/100 mL IVPB 20 milliEquivalent(s) IV Intermittent every 2 hours    MEDICATIONS  (PRN):  ALBUTerol    0.083%. 2.5 milliGRAM(s) Nebulizer once PRN Wheezing  dextrose Oral Gel 15 Gram(s) Oral once PRN Blood Glucose LESS THAN 70 milliGRAM(s)/deciliter         Patient is a 62y old  Male who presents with a chief complaint of MICHELLE (elevated BUN and Cr) (04 Oct 2022 08:20)      HPI:    62YOM with PMH of:   -posterior fossa hemorrhage in  complicated by interventricular hemorrhage and hydrocephalus, s/p EVD, SOC and VPS, s/p trach and PEG (complicated by trach dislodgment and replacement twice)  -HTN  -CAD s/p stent on DAPT  -T2DM  -PICA aneurysm s/p resection on   -hx recurrent c. diff colitis  -hx of urinary retention with urology follow up on recent admission  -hx of b/l distal DVT  -hx lf L pneumothorax  -hx aspiration pneumonia    who was brought in by EMS from [facility] for evaluation of elevated Cr to 4.2 (baseline 0.9) and BUN to 120 (18 in 2022). No other complaints per NH documents. The patient is non verbal at baseline and does not track of follow commands at baseline. Pt has no teixeira catheter on arrival to ED.     In the ED, POC sono positive for large urinary distension >1.5 liters of urine by visual estimation. Pt found to have moderate hydro b/l kidneys, symmetric on POC sono. Teixeira 16F placed with output immediately of >1.3L urine, initially yellow then cloudy white purulent material, and then blood-tinged material/urine being output. Pt found to have full thickness sacral decub on exam 2 fists in diameter, nonpurulent, no discharge. Initial lab workup revealed Hgb 7.4 (7.1 on recent admission) and K 3.1 (repleted) with AG = 17. UA positive for large leukestrase. Pt is being admitted for MICHELLE and UTI.     (04 Oct 2022 08:20)      PAST MEDICAL & SURGICAL HISTORY:  HTN (hypertension)      Diabetes mellitus    Pulmonary HPI  Pulmonary was consulted for shortness of breath in an active smoker. Patient also admits to wheezing.         SOCIAL HX:   Smoking     active smoker                    ETOH     denies                       Other denies illicit drug use    FAMILY HISTORY:  :  No known cardiovascular family history     Review Of Systems:     All ROS are negative except per HPI       Allergies    penicillin (Other)    Intolerances          PHYSICAL EXAM    ICU Vital Signs Last 24 Hrs  T(C): 36.3 (04 Oct 2022 07:33), Max: 36.7 (03 Oct 2022 21:59)  T(F): 97.3 (04 Oct 2022 07:33), Max: 98.1 (03 Oct 2022 21:59)  HR: 78 (04 Oct 2022 07:33) (78 - 91)  BP: 130/70 (04 Oct 2022 07:33) (121/66 - 138/80)  RR: 17 (04 Oct 2022 07:33) (17 - 18)  SpO2: 100% (04 Oct 2022 07:33) (99% - 100%)    O2 Parameters below as of 04 Oct 2022 07:33  Patient On (Oxygen Delivery Method): ventilator      CONSTITUTIONAL:  Ill-appearing. NAD    ENT:   Airway patent,   Mouth with normal mucosa.   No thrush    EYES:   pupils equal,   round and reactive to light.    CARDIAC:   Normal rate,   Regular rhythm.    Heart sounds S1, S2.   No edema    RESPIRATORY:   No wheezing  Trach  Normal chest expansion  No use of accessory muscles    GASTROINTESTINAL:  PEG  Abdomen soft   Non-tender,   No guarding,   + BS    MUSCULOSKELETAL:   Multiple ulcers  Range of motion is not limited,  No clubbing, cyanosis    NEUROLOGICAL:   Alert and oriented   No motor or sensory deficits.  Pertinent DTRs normal    SKIN:   Skin normal color for race,   Warm and dry  No evidence of rash.    PSYCHIATRIC:   No apparent risk to self or others.        10-03-22 @ 07:01  -  10-04-22 @ 07:00  --------------------------------------------------------  IN:  Total IN: 0 mL    OUT:    Ureteral Catheter (mL): 1475 mL  Total OUT: 1475 mL    Total NET: -1475 mL      10-04-22 @ 07:01  -  10-04-22 @ 11:41  --------------------------------------------------------  IN:  Total IN: 0 mL    OUT:    Ureteral Catheter (mL): 2400 mL  Total OUT: 2400 mL    Total NET: -2400 mL        LABS:                          7.4    10.01 )-----------( 361      ( 04 Oct 2022 00:22 )             21.9                                               10    139  |  100  |  132<HH>  ----------------------------<  121<H>  3.0<L>   |  22  |  4.2<HH>    Ca    9.1      04 Oct 2022 00:22    TPro  6.4  /  Alb  2.9<L>  /  TBili  <0.2  /  DBili  x   /  AST  19  /  ALT  9   /  AlkPhos  104  10-04      PT/INR - ( 04 Oct 2022 04:28 )   PT: 12.00 sec;   INR: 1.05 ratio         PTT - ( 04 Oct 2022 04:28 )  PTT:25.4 sec                                       Urinalysis Basic - ( 04 Oct 2022 04:32 )    Color: Light Yellow / Appearance: Clear / S.010 / pH: x  Gluc: x / Ketone: Negative  / Bili: Negative / Urobili: <2 mg/dL   Blood: x / Protein: 30 mg/dL / Nitrite: Negative   Leuk Esterase: Large / RBC: 4 /HPF / WBC 58 /HPF   Sq Epi: x / Non Sq Epi: 7 /HPF / Bacteria: Negative                                                  LIVER FUNCTIONS - ( 04 Oct 2022 00:22 )  Alb: 2.9 g/dL / Pro: 6.4 g/dL / ALK PHOS: 104 U/L / ALT: 9 U/L / AST: 19 U/L / GGT: x                                                                                               Mode: AC/ CMV (Assist Control/ Continuous Mandatory Ventilation)  RR (machine): 15  TV (machine): 400  FiO2: 40  PEEP: 5  MAP: 7  PIP: 12                                          X-Rays reviewed:                                                                                    ECHO    CXR interpreted by me: small left sided pleural effusion    MEDICATIONS  (STANDING):  acetaminophen     Tablet .. 650 milliGRAM(s) Oral once  aspirin  chewable 81 milliGRAM(s) Oral daily  atorvastatin 80 milliGRAM(s) Oral at bedtime  cefTRIAXone   IVPB 1000 milliGRAM(s) IV Intermittent every 24 hours  dextrose 5%. 1000 milliLiter(s) (100 mL/Hr) IV Continuous <Continuous>  dextrose 5%. 1000 milliLiter(s) (50 mL/Hr) IV Continuous <Continuous>  dextrose 50% Injectable 25 Gram(s) IV Push once  dextrose 50% Injectable 12.5 Gram(s) IV Push once  dextrose 50% Injectable 25 Gram(s) IV Push once  ergocalciferol Drops 8000 Unit(s) Oral daily  famotidine  Oral Tab/Cap - Peds 20 milliGRAM(s) Enteral Tube two times a day  ferrous    sulfate Liquid 300 milliGRAM(s) Oral daily  folic acid 1 milliGRAM(s) Oral daily  glucagon  Injectable 1 milliGRAM(s) IntraMuscular once  heparin   Injectable 5000 Unit(s) SubCutaneous every 12 hours  insulin lispro (ADMELOG) corrective regimen sliding scale   SubCutaneous at bedtime  ipratropium 0.02% for Nebulization - Peds 500 MICROGram(s) Inhalation every 6 hours  lactated ringers. 1000 milliLiter(s) (100 mL/Hr) IV Continuous <Continuous>  levETIRAcetam 500 milliGRAM(s) Oral two times a day  midodrine. 5 milliGRAM(s) Oral three times a day  multivitamin 1 Tablet(s) Oral daily  potassium chloride  20 mEq/100 mL IVPB 20 milliEquivalent(s) IV Intermittent every 2 hours    MEDICATIONS  (PRN):  ALBUTerol    0.083%. 2.5 milliGRAM(s) Nebulizer once PRN Wheezing  dextrose Oral Gel 15 Gram(s) Oral once PRN Blood Glucose LESS THAN 70 milliGRAM(s)/deciliter         Patient is a 62y old  Male who presents with a chief complaint of MICHELLE (elevated BUN and Cr) (04 Oct 2022 08:20)      HPI:    62YOM with PMH of:   -posterior fossa hemorrhage in  complicated by interventricular hemorrhage and hydrocephalus, s/p EVD, SOC and VPS, s/p trach and PEG (complicated by trach dislodgment and replacement twice)  -HTN  -CAD s/p stent on DAPT  -T2DM  -PICA aneurysm s/p resection on   -hx recurrent c. diff colitis  -hx of urinary retention with urology follow up on recent admission  -hx of b/l distal DVT  -hx lf L pneumothorax  -hx aspiration pneumonia    who was brought in by EMS from [facility] for evaluation of elevated Cr to 4.2 (baseline 0.9) and BUN to 120 (18 in 2022). No other complaints per NH documents. The patient is non verbal at baseline and does not track of follow commands at baseline. Pt has no teixeira catheter on arrival to ED.     In the ED, POC sono positive for large urinary distension >1.5 liters of urine by visual estimation. Pt found to have moderate hydro b/l kidneys, symmetric on POC sono. Teixeira 16F placed with output immediately of >1.3L urine, initially yellow then cloudy white purulent material, and then blood-tinged material/urine being output. Pt found to have full thickness sacral decub on exam 2 fists in diameter, nonpurulent, no discharge. Initial lab workup revealed Hgb 7.4 (7.1 on recent admission) and K 3.1 (repleted) with AG = 17. UA positive for large leukestrase. Pt is being admitted for MICHELLE and UTI.     (04 Oct 2022 08:20)      PAST MEDICAL & SURGICAL HISTORY:  HTN (hypertension)      Diabetes mellitus    Pulmonary HPI  Pulmonary was consulted for shortness of breath in an active smoker. Patient also admits to wheezing.         SOCIAL HX:   Smoking    UTO                  ETOH     UTO                    Other   UTO    FAMILY HISTORY:  :  No known cardiovascular family history     Review Of Systems:     All ROS are negative except per HPI       Allergies    penicillin (Other)    Intolerances          PHYSICAL EXAM    ICU Vital Signs Last 24 Hrs  T(C): 36.3 (04 Oct 2022 07:33), Max: 36.7 (03 Oct 2022 21:59)  T(F): 97.3 (04 Oct 2022 07:33), Max: 98.1 (03 Oct 2022 21:59)  HR: 78 (04 Oct 2022 07:33) (78 - 91)  BP: 130/70 (04 Oct 2022 07:33) (121/66 - 138/80)  RR: 17 (04 Oct 2022 07:33) (17 - 18)  SpO2: 100% (04 Oct 2022 07:33) (99% - 100%)    O2 Parameters below as of 04 Oct 2022 07:33  Patient On (Oxygen Delivery Method): ventilator      CONSTITUTIONAL:  Ill-appearing. NAD    ENT:   Airway patent,   Mouth with normal mucosa.   No thrush    EYES:   pupils equal,   round and reactive to light.    CARDIAC:   Normal rate,   Regular rhythm.    Heart sounds S1, S2.   No edema    RESPIRATORY:   No wheezing  Trach  Normal chest expansion  No use of accessory muscles    GASTROINTESTINAL:  PEG  Abdomen soft   Non-tender,   No guarding,   + BS    MUSCULOSKELETAL:   Multiple ulcers  Range of motion is not limited,  No clubbing, cyanosis    NEUROLOGICAL:   Alert and oriented   No motor or sensory deficits.  Pertinent DTRs normal    SKIN:   Skin normal color for race,   Warm and dry  No evidence of rash.    PSYCHIATRIC:   No apparent risk to self or others.        10-03-22 @ 07:01  -  10-04-22 @ 07:00  --------------------------------------------------------  IN:  Total IN: 0 mL    OUT:    Ureteral Catheter (mL): 1475 mL  Total OUT: 1475 mL    Total NET: -1475 mL      10-04-22 @ 07:01  -  10-04-22 @ 11:41  --------------------------------------------------------  IN:  Total IN: 0 mL    OUT:    Ureteral Catheter (mL): 2400 mL  Total OUT: 2400 mL    Total NET: -2400 mL        LABS:                          7.4    10.01 )-----------( 361      ( 04 Oct 2022 00:22 )             21.9                                               10-04    139  |  100  |  132<HH>  ----------------------------<  121<H>  3.0<L>   |  22  |  4.2<HH>    Ca    9.1      04 Oct 2022 00:22    TPro  6.4  /  Alb  2.9<L>  /  TBili  <0.2  /  DBili  x   /  AST  19  /  ALT  9   /  AlkPhos  104  10-04      PT/INR - ( 04 Oct 2022 04:28 )   PT: 12.00 sec;   INR: 1.05 ratio         PTT - ( 04 Oct 2022 04:28 )  PTT:25.4 sec                                       Urinalysis Basic - ( 04 Oct 2022 04:32 )    Color: Light Yellow / Appearance: Clear / S.010 / pH: x  Gluc: x / Ketone: Negative  / Bili: Negative / Urobili: <2 mg/dL   Blood: x / Protein: 30 mg/dL / Nitrite: Negative   Leuk Esterase: Large / RBC: 4 /HPF / WBC 58 /HPF   Sq Epi: x / Non Sq Epi: 7 /HPF / Bacteria: Negative                                                  LIVER FUNCTIONS - ( 04 Oct 2022 00:22 )  Alb: 2.9 g/dL / Pro: 6.4 g/dL / ALK PHOS: 104 U/L / ALT: 9 U/L / AST: 19 U/L / GGT: x                                                                                               Mode: AC/ CMV (Assist Control/ Continuous Mandatory Ventilation)  RR (machine): 15  TV (machine): 400  FiO2: 40  PEEP: 5  MAP: 7  PIP: 12                                          X-Rays reviewed:                                                                                    ECHO    CXR interpreted by me: small left sided pleural effusion    MEDICATIONS  (STANDING):  acetaminophen     Tablet .. 650 milliGRAM(s) Oral once  aspirin  chewable 81 milliGRAM(s) Oral daily  atorvastatin 80 milliGRAM(s) Oral at bedtime  cefTRIAXone   IVPB 1000 milliGRAM(s) IV Intermittent every 24 hours  dextrose 5%. 1000 milliLiter(s) (100 mL/Hr) IV Continuous <Continuous>  dextrose 5%. 1000 milliLiter(s) (50 mL/Hr) IV Continuous <Continuous>  dextrose 50% Injectable 25 Gram(s) IV Push once  dextrose 50% Injectable 12.5 Gram(s) IV Push once  dextrose 50% Injectable 25 Gram(s) IV Push once  ergocalciferol Drops 8000 Unit(s) Oral daily  famotidine  Oral Tab/Cap - Peds 20 milliGRAM(s) Enteral Tube two times a day  ferrous    sulfate Liquid 300 milliGRAM(s) Oral daily  folic acid 1 milliGRAM(s) Oral daily  glucagon  Injectable 1 milliGRAM(s) IntraMuscular once  heparin   Injectable 5000 Unit(s) SubCutaneous every 12 hours  insulin lispro (ADMELOG) corrective regimen sliding scale   SubCutaneous at bedtime  ipratropium 0.02% for Nebulization - Peds 500 MICROGram(s) Inhalation every 6 hours  lactated ringers. 1000 milliLiter(s) (100 mL/Hr) IV Continuous <Continuous>  levETIRAcetam 500 milliGRAM(s) Oral two times a day  midodrine. 5 milliGRAM(s) Oral three times a day  multivitamin 1 Tablet(s) Oral daily  potassium chloride  20 mEq/100 mL IVPB 20 milliEquivalent(s) IV Intermittent every 2 hours    MEDICATIONS  (PRN):  ALBUTerol    0.083%. 2.5 milliGRAM(s) Nebulizer once PRN Wheezing  dextrose Oral Gel 15 Gram(s) Oral once PRN Blood Glucose LESS THAN 70 milliGRAM(s)/deciliter         Patient is a 62y old  Male who presents with a chief complaint of MICHELLE (elevated BUN and Cr) (04 Oct 2022 08:20)      HPI:    62YOM with PMH of:   -posterior fossa hemorrhage in  complicated by interventricular hemorrhage and hydrocephalus, s/p EVD, SOC and VPS, s/p trach and PEG (complicated by trach dislodgment and replacement twice)  -HTN  -CAD s/p stent on DAPT  -T2DM  -PICA aneurysm s/p resection on   -hx recurrent c. diff colitis  -hx of urinary retention with urology follow up on recent admission  -hx of b/l distal DVT  -hx lf L pneumothorax  -hx aspiration pneumonia    who was brought in by EMS from [facility] for evaluation of elevated Cr to 4.2 (baseline 0.9) and BUN to 120 (18 in 2022). No other complaints per NH documents. The patient is non verbal at baseline and does not track of follow commands at baseline. Pt has no teixeira catheter on arrival to ED.     In the ED, POC sono positive for large urinary distension >1.5 liters of urine by visual estimation. Pt found to have moderate hydro b/l kidneys, symmetric on POC sono. Teixeira 16F placed with output immediately of >1.3L urine, initially yellow then cloudy white purulent material, and then blood-tinged material/urine being output. Pt found to have full thickness sacral decub on exam 2 fists in diameter, nonpurulent, no discharge. Initial lab workup revealed Hgb 7.4 (7.1 on recent admission) and K 3.1 (repleted) with AG = 17. UA positive for large leukestrase. Pt is being admitted for MICHELLE and UTI.     (04 Oct 2022 08:20)      PAST MEDICAL & SURGICAL HISTORY:  HTN (hypertension)      Diabetes mellitus    Pulmonary HPI  Pulmonary was consulted for shortness of breath in an active smoker. Patient also admits to wheezing.         SOCIAL HX:   Smoking    UTO                  ETOH     UTO                    Other   UTO    FAMILY HISTORY:  :  No known cardiovascular family history     Review Of Systems:     All ROS are negative except per HPI       Allergies    penicillin (Other)    Intolerances          PHYSICAL EXAM    ICU Vital Signs Last 24 Hrs  T(C): 36.3 (04 Oct 2022 07:33), Max: 36.7 (03 Oct 2022 21:59)  T(F): 97.3 (04 Oct 2022 07:33), Max: 98.1 (03 Oct 2022 21:59)  HR: 78 (04 Oct 2022 07:33) (78 - 91)  BP: 130/70 (04 Oct 2022 07:33) (121/66 - 138/80)  RR: 17 (04 Oct 2022 07:33) (17 - 18)  SpO2: 100% (04 Oct 2022 07:33) (99% - 100%)    O2 Parameters below as of 04 Oct 2022 07:33  Patient On (Oxygen Delivery Method): ventilator      CONSTITUTIONAL:  Ill-appearing. NAD    ENT:   Airway patent,   Mouth with normal mucosa.   No thrush    EYES:   pupils equal,   round and reactive to light.    CARDIAC:   Normal rate,   Regular rhythm.    Heart sounds S1, S2.   No edema    RESPIRATORY:   No wheezing  Trach  Normal chest expansion  No use of accessory muscles    GASTROINTESTINAL:  PEG  Abdomen soft   Non-tender,   No guarding,   + BS    MUSCULOSKELETAL:   Multiple ulcers  Range of motion is not limited,  No clubbing, cyanosis    NEUROLOGICAL:   Unresponsive  non verbal     SKIN:   Skin normal color for race,   Warm and dry  No evidence of rash.    PSYCHIATRIC:   No apparent risk to self or others.        10-03-22 @ 07:01  -  10-04-22 @ 07:00  --------------------------------------------------------  IN:  Total IN: 0 mL    OUT:    Ureteral Catheter (mL): 1475 mL  Total OUT: 1475 mL    Total NET: -1475 mL      10-04-22 @ 07:01  -  10-04-22 @ 11:41  --------------------------------------------------------  IN:  Total IN: 0 mL    OUT:    Ureteral Catheter (mL): 2400 mL  Total OUT: 2400 mL    Total NET: -2400 mL        LABS:                          7.4    10.01 )-----------( 361      ( 04 Oct 2022 00:22 )             21.9                                               10    139  |  100  |  132<HH>  ----------------------------<  121<H>  3.0<L>   |  22  |  4.2<HH>    Ca    9.1      04 Oct 2022 00:22    TPro  6.4  /  Alb  2.9<L>  /  TBili  <0.2  /  DBili  x   /  AST  19  /  ALT  9   /  AlkPhos  104  10-04      PT/INR - ( 04 Oct 2022 04:28 )   PT: 12.00 sec;   INR: 1.05 ratio         PTT - ( 04 Oct 2022 04:28 )  PTT:25.4 sec                                       Urinalysis Basic - ( 04 Oct 2022 04:32 )    Color: Light Yellow / Appearance: Clear / S.010 / pH: x  Gluc: x / Ketone: Negative  / Bili: Negative / Urobili: <2 mg/dL   Blood: x / Protein: 30 mg/dL / Nitrite: Negative   Leuk Esterase: Large / RBC: 4 /HPF / WBC 58 /HPF   Sq Epi: x / Non Sq Epi: 7 /HPF / Bacteria: Negative                                                  LIVER FUNCTIONS - ( 04 Oct 2022 00:22 )  Alb: 2.9 g/dL / Pro: 6.4 g/dL / ALK PHOS: 104 U/L / ALT: 9 U/L / AST: 19 U/L / GGT: x                                                                                               Mode: AC/ CMV (Assist Control/ Continuous Mandatory Ventilation)  RR (machine): 14  TV (machine): 400  FiO2: 40  PEEP: 5  MAP: 7  PIP: 12                                          X-Rays reviewed:                                                                                    ECHO      MEDICATIONS  (STANDING):  acetaminophen     Tablet .. 650 milliGRAM(s) Oral once  aspirin  chewable 81 milliGRAM(s) Oral daily  atorvastatin 80 milliGRAM(s) Oral at bedtime  cefTRIAXone   IVPB 1000 milliGRAM(s) IV Intermittent every 24 hours  dextrose 5%. 1000 milliLiter(s) (100 mL/Hr) IV Continuous <Continuous>  dextrose 5%. 1000 milliLiter(s) (50 mL/Hr) IV Continuous <Continuous>  dextrose 50% Injectable 25 Gram(s) IV Push once  dextrose 50% Injectable 12.5 Gram(s) IV Push once  dextrose 50% Injectable 25 Gram(s) IV Push once  ergocalciferol Drops 8000 Unit(s) Oral daily  famotidine  Oral Tab/Cap - Peds 20 milliGRAM(s) Enteral Tube two times a day  ferrous    sulfate Liquid 300 milliGRAM(s) Oral daily  folic acid 1 milliGRAM(s) Oral daily  glucagon  Injectable 1 milliGRAM(s) IntraMuscular once  heparin   Injectable 5000 Unit(s) SubCutaneous every 12 hours  insulin lispro (ADMELOG) corrective regimen sliding scale   SubCutaneous at bedtime  ipratropium 0.02% for Nebulization - Peds 500 MICROGram(s) Inhalation every 6 hours  lactated ringers. 1000 milliLiter(s) (100 mL/Hr) IV Continuous <Continuous>  levETIRAcetam 500 milliGRAM(s) Oral two times a day  midodrine. 5 milliGRAM(s) Oral three times a day  multivitamin 1 Tablet(s) Oral daily  potassium chloride  20 mEq/100 mL IVPB 20 milliEquivalent(s) IV Intermittent every 2 hours    MEDICATIONS  (PRN):  ALBUTerol    0.083%. 2.5 milliGRAM(s) Nebulizer once PRN Wheezing  dextrose Oral Gel 15 Gram(s) Oral once PRN Blood Glucose LESS THAN 70 milliGRAM(s)/deciliter

## 2022-10-04 NOTE — CONSULT NOTE ADULT - ASSESSMENT
62YOM with PMH of ICH sp trach/PEG, HTN/HLD, CAD, hx of c.diff who was brought in by EMS from [facility] for evaluation of elevated Cr to 4.2 (baseline 0.9) and BUN to 120 (18 in 01/2022). Pt was in acute urinary retention and is now sp teixeira catheter with >1.3L urine output. Nephro eval for MICHELLE.    Assessment and plan  MICHELLE/ Hx of bladder mass/ ICH s/p trach and peg/ CAD  MICHELLE likely post renal, possible imposed ATN  s/p folley insertion with good UO  post obstructive diuresis, continue with LR at 100 cc/hr  replete K  check Mg level  Hx of 2.4 cm bladder lesion in 2021, CT a/p with IV contrast once MICHELLE resolves  urology eval  keep folley  strict i/os  will follow  62YOM with PMH of ICH sp trach/PEG, HTN/HLD, CAD, hx of c.diff who was brought in by EMS from [facility] for evaluation of elevated Cr to 4.2 (baseline 0.9) and BUN to 120 (18 in 01/2022). Pt was in acute urinary retention and is now sp teixeira catheter with >1.3L urine output. Nephro eval for MICHELLE.    Assessment and plan    MICHELLE/ Hx of bladder mass/ ICH s/p trach and peg/ CAD  MICHELLE likely post renal, possible imposed ATN  s/p folley insertion with good UO  post obstructive diuresis, continue with LR at 100 cc/hr  replete K  check Mg level  Hx of 2.4 cm bladder lesion in 2021, CT a/p with IV contrast once MICHELLE resolves  urology eval  keep folley  strict i/os  will follow

## 2022-10-04 NOTE — ADVANCED PRACTICE NURSE CONSULT - RECOMMEDATIONS
1. Stage 4 sacrococcygeal pressure injury- Cleanse wound w/ hydrohclorous acid/ VASHE wound solution, gently pat dry. Apply Cavilon no-sting barrier film to wound edges & periwound to prevent maceration. Lightly pack wound bed & all undermining areas w/ VASHE moistened gaze to fill in wound depth and maintain clean wound bed, cover w/ foam Allevyn dressing or ABD pad secured w/ tape. Change dressing daily & prn for strike-through drainage or soiling.  -Assess skin/wound qshift, report changes to primary provider.     Additional recs:       -Continue turning/positioning patient from side-to-side q2h while in bed, or in accordance w/ pt's plan of care. Continue utilizing pillows and/or z-bernie fluidized positioner to assist w/ turning/positioning.  -Continue to offload heels from bed surface with heel protectors.   -Continue applying Coloplast Gris Protect moisture barrier cream to buttock and perineal area daily and prn after each incontinent episode.    -Continue utilizing one underpad underneath patient to contain incontinence episodes; change pad when saturated/soiled.   -When/if teixeira d/c'ed & patient w/ consistent urinary incontinence, consider utilizing external urinary  or condom catheter (if patient candidate) to contain any urinary incontinence.   -Continue nutrition consult & tight glucose control for optimal wound healing & nutritional status.     Plan of Care: Primary RN Tiny at bedside & made aware of above recs. Spoke w/  covering/primary MD Crawford (at [phone]) in regards to above. No further needs/recs from [facility] service at this time. Staff RN to perform routine skin/wound assessment and manage wound care. Questions or concerns or if wound worsens and reconsult needed, please contact [facility], [phone].

## 2022-10-04 NOTE — ED PROVIDER NOTE - IV ALTEPLASE EXCL REL HIDDEN
Oral Chemotherapy Monitoring Program    Subjective/Objective:  Slava Lane is a 51 year old male contacted by phone for a follow-up visit for oral chemotherapy.  lSava verified his dosing regimen for ibrutinib and venetoclax. He mentioned missing a dose last Sunday when he had to go to work and did not double up on his dose the next day. He denies experiencing constipation/diarrhea and endorses nausea approximately once a week that lasts around half a day. Slava's nausea is currently controlled by taking Pepcid. He thanked me for the call.     ORAL CHEMOTHERAPY 2/4/2021 2/5/2021 2/5/2021 2/18/2021 2/18/2021 3/10/2021 3/10/2021   Assessment Type Monthly Follow up Lab Monitoring Lab Monitoring Refill Refill Lab Monitoring;Monthly Follow up Lab Monitoring;Monthly Follow up   Diagnosis Code Non-Hodgkin Lymphoma (NHL) Non-Hodgkin Lymphoma (NHL) Non-Hodgkin Lymphoma (NHL) Non-Hodgkin Lymphoma (NHL) Non-Hodgkin Lymphoma (NHL) Non-Hodgkin Lymphoma (NHL) Non-Hodgkin Lymphoma (NHL)   Providers Dr. Miladis Redding   [facility]   Drug Name Venclexta (Venetoclax) Venclexta (Venetoclax) Imbruvica (Ibrutinib) Imbruvica (Ibrutinib) Venclexta (Venetoclax) Venclexta (Venetoclax) Imbruvica (Ibrutinib)   Dose 400 mg 400 mg 420 mg 420 mg 400 mg 400 mg 420 mg   Current Schedule Daily Daily Daily Daily Daily Daily Daily   Cycle Details Continuous Continuous Continuous Continuous Continuous Continuous Continuous   Start Date of Last Cycle - - - - - - -   Planned next cycle start date - - - - - - -   Doses missed in last 2 weeks 1 - - - - - -   Adherence Assessment Adherent - - - - - -   Adverse Effects No AE identified during assessment - - - - - -   Nausea Resolved due to intervention - - - - - -   Pharmacist Intervention(nausea) - - - - - - -   Intervention(s) - - - - - - -   Home BPs - - - -  "- - -   Any new drug interactions? No - - - - - -   Is the dose as ordered appropriate for the patient? - - - - - - -   Is the patient currently in pain? - - - - - - -   Has the patient been assessed within the past 6 months for depression? - - - - - - -   Has the patient missed any days of school, work, or other routine activity? - - - - - - -   Since the last time we talked, have you been hospitalized or used the emergency room? - - - - - - -       Last PHQ-2 Score on record: No flowsheet data found.    Vitals:  BP:   BP Readings from Last 1 Encounters:   04/17/20 99/59     Wt Readings from Last 1 Encounters:   04/17/20 76.2 kg (167 lb 15.9 oz)     Estimated body surface area is 1.9 meters squared as calculated from the following:    Height as of 4/17/20: 1.702 m (5' 7\").    Weight as of 4/17/20: 76.2 kg (167 lb 15.9 oz).    Labs:  No results found for NA within last 30 days.     No results found for K within last 30 days.     No results found for CA within last 30 days.     No results found for Mag within last 30 days.     No results found for Phos within last 30 days.     No results found for ALBUMIN within last 30 days.     No results found for BUN within last 30 days.     No results found for CR within last 30 days.     No results found for AST within last 30 days.     No results found for ALT within last 30 days.     No results found for BILITOTAL within last 30 days.     No results found for WBC within last 30 days.     No results found for HGB within last 30 days.     No results found for PLT within last 30 days.     No results found for ANC within last 30 days.         Assessment/Plan:  Slava is tolerating therapy with minimal side effects. Continue therapy as planned.    Follow-Up:  Look for labs ~4/9    Han Henderson  Pharmacy Intern  [facility]  [phone]          " show

## 2022-10-04 NOTE — ADVANCED PRACTICE NURSE CONSULT - ASSESSMENT
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 diff colitis; hx of urinary retention with urology follow up on recent admission; hx of b/l distal DVT; hx lf L pneumothorax; hx aspiration pneumonia brought in by EMS from [facility] (10/4) for evaluation of elevated Cr to 4.2 (baseline 0.9) and BUN to 120 (18 in 01/2022). No other complaints per NH documents. The patient is non verbal at baseline and does not track of follow commands at baseline. Pt has no teixeira catheter on arrival to ED.   In the ED, POC sono positive for large urinary distension >1.5 liters of urine by visual estimation. Pt found to have moderate hydro b/l kidneys, symmetric on POC sono. Teixeira 16F placed with output immediately of >1.3L urine, initially yellow then cloudy white purulent material, and then blood-tinged material/urine being output. Pt found to have full thickness sacral decub on exam 2 fists in diameter, nonpurulent, no discharge. Initial lab workup revealed Hgb 7.4 (7.1 on recent admission) and K 3.1 (repleted) with AG = 17. UA positive for large leukestrase. Pt is being admitted for PATRICK and UTI.  Currently admitted to medicine with primary diagnosis of Patrick; today is hospital day-0d; currently in ED, being managed for PATRICK; UTI; Full thickness decubital ulcer; hx of hypertension vs. Hypotension; T2DM; HLD.     Received patient  in ED, laying in stretcher, (heel protectors from NH in place), HOB elevated 30 degrees. Pt awake, eyes open, non-attentive, nonresponsive, nonverbal, vented via trach, Sarthak contracted, primary RN Tiny  made aware of purpose of WOCN visit, agreeable to consult.     Bilateral heels intact.     With assistance from RN, turned patient to left side for skin assessment. Foam Allevyn dressing to sacrococcygeal in place, dressing removed.    Type of wound: Stage 4 pressure injury; POA   Location: sacrococcygeal   Wound measurements: 10cm x 10cm x 2cm  Tunneling: none  Undermining: from 1-6 o'clock a 3cm deep  Wound bed: clean, light pink - red tissue   Wound edges: open  Periwound: healed light pink tissue & brown hyperpigmentation circumferentially  Wound exudate: moderate amount of serous drainage present on removed dressing- RN reports dressing applied yesterday by night RN  Wound odor: none  Induration, erythema, crepitus,  warmth: none   Wound pain: no s/s pain present on assessment     Patient bedbound, immobile, + teixeira, incontinent of small amount of loose stool during WOCN visit. Receiving TF via G tube.

## 2022-10-04 NOTE — CONSULT NOTE ADULT - SUBJECTIVE AND OBJECTIVE BOX
NEPHROLOGY CONSULTATION NOTE      62YOM with PMH of ICH sp trach/PEG, HTN/HLD, CAD, hx of c.diff who was brought in by EMS from [facility] for evaluation of elevated Cr to 4.2 (baseline 0.9) and BUN to 120 (18 in 2022). Pt was in acute urinary retention and is now sp teixeira catheter with >1.3L urine output.  patient seen at bedside, awake, not alert or cooperative.  VS within normal range. not following commands.    PAST MEDICAL & SURGICAL HISTORY:  HTN (hypertension)      Diabetes mellitus        Allergies:  penicillin (Other)    Home Medications Reviewed  Hospital Medications:   MEDICATIONS  (STANDING):  acetaminophen     Tablet .. 650 milliGRAM(s) Oral once  aspirin  chewable 81 milliGRAM(s) Oral daily  atorvastatin 80 milliGRAM(s) Oral at bedtime  cefTRIAXone   IVPB 1000 milliGRAM(s) IV Intermittent every 24 hours  dextrose 5%. 1000 milliLiter(s) (100 mL/Hr) IV Continuous <Continuous>  dextrose 5%. 1000 milliLiter(s) (50 mL/Hr) IV Continuous <Continuous>  dextrose 50% Injectable 25 Gram(s) IV Push once  dextrose 50% Injectable 12.5 Gram(s) IV Push once  dextrose 50% Injectable 25 Gram(s) IV Push once  ergocalciferol Drops 8000 Unit(s) Oral daily  famotidine  Oral Tab/Cap - Peds 20 milliGRAM(s) Enteral Tube two times a day  ferrous    sulfate Liquid 300 milliGRAM(s) Oral daily  folic acid 1 milliGRAM(s) Oral daily  glucagon  Injectable 1 milliGRAM(s) IntraMuscular once  heparin   Injectable 5000 Unit(s) SubCutaneous every 12 hours  insulin lispro (ADMELOG) corrective regimen sliding scale   SubCutaneous at bedtime  ipratropium 17 MICROgram(s) HFA Inhaler 1 Puff(s) Inhalation every 6 hours  lactated ringers. 1000 milliLiter(s) (100 mL/Hr) IV Continuous <Continuous>  levETIRAcetam 500 milliGRAM(s) Oral two times a day  midodrine. 5 milliGRAM(s) Oral three times a day  multivitamin 1 Tablet(s) Oral daily  potassium chloride  20 mEq/100 mL IVPB 20 milliEquivalent(s) IV Intermittent every 2 hours  tamsulosin 0.4 milliGRAM(s) Oral at bedtime    SOCIAL HISTORY:  Denies ETOH,Smoking,   FAMILY HISTORY:      REVIEW OF SYSTEMS:  CONSTITUTIONAL: patient not cooperative, unable to obtain comprehensive ROS.    VITALS:  Vital Signs Last 24 Hrs  T(C): 36.4 (04 Oct 2022 15:24), Max: 36.7 (03 Oct 2022 21:59)  T(F): 97.6 (04 Oct 2022 15:24), Max: 98.1 (03 Oct 2022 21:59)  HR: 89 (04 Oct 2022 15:) (78 - 92)  BP: 139/67 (04 Oct 2022 15:24) (121/66 - 139/67)  BP(mean): --  RR: 17 (04 Oct 2022 15:) (17 - 18)  SpO2: 100% (04 Oct 2022 15:) (99% - 100%)    Parameters below as of 04 Oct 2022 15:24  Patient On (Oxygen Delivery Method): ventilator        10-03 @ 07:  -  10-04 @ 07:00  --------------------------------------------------------  IN: 0 mL / OUT: 1475 mL / NET: -1475 mL    10-04 @ :  -  10-04 @ 15:53  --------------------------------------------------------  IN: 0 mL / OUT: 2400 mL / NET: -2400 mL      Height (cm): 172.7 (10-04 @ 10:56)  Weight (kg): 68.039 (10-04 @ 10:56)  BMI (kg/m2): 22.8 (10-04 @ 10:56)  BSA (m2): 1.81 (10-04 @ 10:56)  PHYSICAL EXAM:  Constitutional: NAD  HEENT: anicteric sclera, oropharynx clear, MMM  Neck: No JVD  Respiratory: CTAB, no wheezes, rales or rhonchi  Cardiovascular: S1, S2, RRR  Gastrointestinal: BS+, soft, NT/ND  Extremities: decorticate position  Neurological: A/O x 0, trached, not following commands, awake, not alert  : No CVA tenderness. teixeira placed   Skin: No rashes      LABS:  10-04    143  |  104  |  129<HH>  ----------------------------<  145<H>  2.6<LL>   |  22  |  3.9<H>    Ca    9.3      04 Oct 2022 11:26  Mg     2.8     10-04    TPro  6.2  /  Alb  2.7<L>  /  TBili  0.2  /  DBili      /  AST  11  /  ALT  8   /  AlkPhos  90  10-04    Creatinine Trend: 3.9 <--, 4.2 <--                        7.4    9.75  )-----------( 408      ( 04 Oct 2022 11:26 )             22.1     Urine Studies:  Urinalysis Basic - ( 04 Oct 2022 04:32 )    Color: Light Yellow / Appearance: Clear / S.010 / pH:   Gluc:  / Ketone: Negative  / Bili: Negative / Urobili: <2 mg/dL   Blood:  / Protein: 30 mg/dL / Nitrite: Negative   Leuk Esterase: Large / RBC: 4 /HPF / WBC 58 /HPF   Sq Epi:  / Non Sq Epi: 7 /HPF / Bacteria: Negative      Osmolality, Random Urine: 260 mos/kg (10-04 @ 07:21)  Sodium, Random Urine: 77.0 mmoL/L (10-04 @ 07:21)

## 2022-10-04 NOTE — CONSULT NOTE ADULT - SUBJECTIVE AND OBJECTIVE BOX
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 diff colitis  -hx of urinary retention with urology follow up on recent admission  -hx of b/l distal DVT  -hx lf L pneumothorax  -hx aspiration pneumonia    who was brought in by EMS from [facility] for evaluation of elevated Cr to 4.2 (baseline 0.9) and BUN to 120 (18 in 01/2022). No other complaints per NH documents. The patient is non verbal at baseline and does not track of follow commands at baseline. Pt has no teixeira catheter on arrival to ED.     In the ED, POC sono positive for large urinary distension >1.5 liters of urine by visual estimation. Pt found to have moderate hydro b/l kidneys, symmetric on POC sono. Teixeira 16F placed with output immediately of >1.3L urine, initially yellow then cloudy white purulent material, and then blood-tinged material/urine being output. Pt found to have full thickness sacral decub on exam 2 fists in diameter, nonpurulent, no discharge. Initial lab workup revealed Hgb 7.4 (7.1 on recent admission) and K 3.1 (repleted) with AG = 17. UA positive for large leukestrase. Pt is being admitted for MICHELLE and UTI. (04 Oct 2022 08:20)    NUTRITION SUPPORT NOTE:    NST consulted for management of tube feeds. Patient is from [facility].   As per NH record review, patient is on Glucerna 1.2 65 cc/hr from 17:00-09:00 (16 hours) with 300 mL free water flushes (total 1200 mL/day)    REVIEW OF SYSTEMS:  Negative except as noted above.     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 diff colitis  -hx of urinary retention with urology follow up on recent admission  -hx of b/l distal DVT  -hx lf L pneumothorax  -hx aspiration pneumonia    ALLERGIES:  penicillin (Other)    VITALS:  T(F): 97.3 (10-04 @ 07:33), Max: 97.3 (10-04 @ 07:33)  HR: 92 (10-04 @ 12:04) (78 - 92)  BP: 124/69 (10-04 @ 12:04) (121/66 - 130/70)  RR: 17 (10-04 @ 07:33) (17 - 18)  SpO2: 100% (10-04 @ 07:33) (99% - 100%)    HEIGHT/WEIGHT/BMI:   Height (cm): 172.7 (10-04), 177.8 (11-23)  Weight (kg): 68.039 (10-04), 86.8 (11-23)  BMI (kg/m2): 22.8 (10-04), 27.5 (11-23)    PHYSICAL EXAM:   GENERAL: NAD, non-responsive, generalized muscle wasting   HEENT: Moist mucous membranes, temporal wasting   ABDOMEN: Soft, Nontender, Nondistended, PEG tube in place with minimal drainage around tube that appear to be feeds, no erythema  EXTREMITIES:  No clubbing, cyanosis, or edema  SKIN: warm and well perfused; No obvious rashes or lesions    I/Os:     10-03-22 @ 07:01  -  10-04-22 @ 07:00  --------------------------------------------------------  IN:  Total IN: 0 mL    OUT:    Ureteral Catheter (mL): 1475 mL  Total OUT: 1475 mL    Total NET: -1475 mL    STANDING MEDICATIONS:   acetaminophen     Tablet .. 650 milliGRAM(s) Oral once  aspirin  chewable 81 milliGRAM(s) Oral daily  atorvastatin 80 milliGRAM(s) Oral at bedtime  cefTRIAXone   IVPB 1000 milliGRAM(s) IV Intermittent every 24 hours  dextrose 5%. 1000 milliLiter(s) IV Continuous <Continuous>  dextrose 5%. 1000 milliLiter(s) IV Continuous <Continuous>  dextrose 50% Injectable 25 Gram(s) IV Push once  dextrose 50% Injectable 12.5 Gram(s) IV Push once  dextrose 50% Injectable 25 Gram(s) IV Push once  ergocalciferol Drops 8000 Unit(s) Oral daily  famotidine  Oral Tab/Cap - Peds 20 milliGRAM(s) Enteral Tube two times a day  ferrous    sulfate Liquid 300 milliGRAM(s) Oral daily  folic acid 1 milliGRAM(s) Oral daily  glucagon  Injectable 1 milliGRAM(s) IntraMuscular once  heparin   Injectable 5000 Unit(s) SubCutaneous every 12 hours  insulin lispro (ADMELOG) corrective regimen sliding scale   SubCutaneous at bedtime  ipratropium 0.02% for Nebulization - Peds 500 MICROGram(s) Inhalation every 6 hours  lactated ringers. 1000 milliLiter(s) IV Continuous <Continuous>  levETIRAcetam 500 milliGRAM(s) Oral two times a day  midodrine. 5 milliGRAM(s) Oral three times a day  multivitamin 1 Tablet(s) Oral daily  potassium chloride  20 mEq/100 mL IVPB 20 milliEquivalent(s) IV Intermittent every 2 hours      LABS:                         7.4    9.75  )-----------( 408      ( 04 Oct 2022 11:26 )             22.1     143  |  104  |  x   ----------------------------<  145<H>          (10-04-22 @ 11:26)  x    |  22  |  3.9<H>    Ca    9.3          (10-04-22 @ 11:26)  Mg     2.8         (10-04-22 @ 11:26)    TPro  6.2  /  Alb  2.7<L>  /  TBili  0.2  /  DBili  x   /  AST  11  /  ALT  8   /  AlkPhos  90       10-04-22 @ 11:26    Blood Urea Nitrogen, Serum: 132 (10.04.22 @ 00:22)   Potassium, Serum: 2.6 (10.04.22 @ 11:26)     DIET:   Diet, NPO with Tube Feed:   Tube Feeding Modality: Gastrostomy  Glucerna 1.2 Blake  Total Volume for 24 Hours (mL): 1040  Continuous  Until Goal Tube Feed Rate (mL per Hour): 65  Tube Feed Duration (in Hours): 16  Tube Feed Start Time: 12:00  Free Water Flush  Bolus   Total Volume per Flush (mL): 300   Frequency: Every 6 Hours (10-04-22 @ 11:13) [Active]

## 2022-10-04 NOTE — CONSULT NOTE ADULT - ATTENDING COMMENTS
62YOM with PMH of ICH sp trach/PEG, HTN/HLD, CAD, hx of c.diff who was brought in by EMS from [facility] for evaluation of elevated Cr to 4.2 (baseline 0.9) and BUN to 120 (18 in 01/2022). Pt was in acute urinary retention and is now sp teixeira catheter with >1.3L urine output. Nephro eval for MICHELLE.    # MICHELLE obstructive in nature / UTI / anemia acute on chronic / VDRF/ can not rule out ATN  agree with teixeira  keep on IVF LR at 75 cc per hour  replete K to > 3.5   check MG  on antibx follow cx   will follow
IMPRESSION:  MICHELLE, likely postobstructive uropathy  UTI  HO Multiple pressure ulcers  Chronic hypoxemic respiratory failure  S/p trach and PEG (complicated by trach dislodgment and replacement twice)  HO Posterior fossa hemorrhage c/b severe IVH with casting of the 4th and 3rd ventricles with hydrocephalus, s/p EVD and SOC  HO PICA aneurysm now s/p resection  HO Recurrent c. diff colitis  HO Urinary retention  HO B/L Distal DVT  HO L pneumothorax  HO Aspiration pneumonia  Non verbal and does not track or follow commands at baseline.    plan as outlined above

## 2022-10-04 NOTE — H&P ADULT - NSHPPHYSICALEXAM_GEN_ALL_CORE
CONSTITUTIONAL: nonverbal, NAD, nontoxic, resting comfortably in bed trach/peg in place  SKIN:  warm, dry; no diaphoresis/icterus, +sacral decubitus ulcer   HEAD:  normocephalic   ENT: trach in place  NECK: supple, no deformity  CARD: RRR; S1 S2  RESP:  trach connected to vent (400 tidal volume, 15 RR, PEEP 5, fiO2 40% at time of initial evaluation w/ O2 sat on pulse ox %); lungs clear b/l  ABD: + PEG in place, site clean w/o erythema/drainage; abd S/ND  MSK: + chronic contractures x4 extremities, no apparent acute extremity injury/deformity;   NEURO: nonverbal, opens closed, does not move extremities voluntarily, does not follow commands

## 2022-10-04 NOTE — PATIENT PROFILE ADULT - FALL HARM RISK - HARM RISK INTERVENTIONS
Assistance with ambulation/Assistance OOB with selected safe patient handling equipment/Communicate Risk of Fall with Harm to all staff/Discuss with provider need for PT consult/Monitor gait and stability/Orthostatic vital signs/Reinforce activity limits and safety measures with patient and family/Tailored Fall Risk Interventions/Visual Cue: Yellow wristband and red socks/Bed in lowest position, wheels locked, appropriate side rails in place/Call bell, personal items and telephone in reach/Instruct patient to call for assistance before getting out of bed or chair/Non-slip footwear when patient is out of bed/[place] to call system/Physically safe environment - no spills, clutter or unnecessary equipment/Purposeful Proactive Rounding/Room/bathroom lighting operational, light cord in reach

## 2022-10-04 NOTE — CONSULT NOTE ADULT - ASSESSMENT
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 diff colitis  HO Urinary retention  HO B/L Distal DVT  HO L pneumothorax  HO Aspiration pneumonia  Non verbal and does not track or follow commands at baseline.    PLAN:    CNS: Hold CNS depressants. Pain control as needed    HEENT: Oral care    PULMONARY:  HOB @ 45 degrees. Vent changes: continue with Volume A/C for now. Target SpO2 92-96%, titrate oxygen as tolerated.    CARDIOVASCULAR: Avoid volume overload. Continue home midodrine. ECHO.     GI: GI prophylaxis. PEG feeding as tolerated. Bowel regimen     RENAL: Follow up lytes. Correct as needed. Barney placement. Trend Cr. Renal sono. Avoid nephrotoxins.     INFECTIOUS DISEASE: Follow up culture. Rocephin for now. ID eval.     HEMATOLOGICAL: DVT prophylaxis.    ENDOCRINE:  Follow up FS. Target -180.     MUSCULOSKELETAL: Wound care nurse eval.     In current state, will not benefit from MICU, if condition changes, please recall  IMPRESSION:  MICHELLE, likely postobstructive uropathy  UTI  HO Multiple pressure ulcers  Chronic hypoxemic respiratory failure  S/p trach and PEG (complicated by trach dislodgment and replacement twice)  HO Posterior fossa hemorrhage c/b severe IVH with casting of the 4th and 3rd ventricles with hydrocephalus, s/p EVD and SOC  HO PICA aneurysm now s/p resection  HO Recurrent c. diff colitis  HO Urinary retention  HO B/L Distal DVT  HO L pneumothorax  HO Aspiration pneumonia  Non verbal and does not track or follow commands at baseline.    PLAN:    CNS: Hold CNS depressants. Pain control as needed    HEENT: Oral care    PULMONARY:  HOB @ 45 degrees. Vent changes: continue with Volume A/C for now. Target SpO2 92-96%, titrate oxygen as tolerated.    CARDIOVASCULAR: Avoid volume overload. Continue home midodrine. ECHO.     GI: GI prophylaxis. PEG feeding as tolerated. Bowel regimen     RENAL: Follow up lytes. Correct as needed. Barney placement. Trend Cr. Renal sono. Avoid nephrotoxins.     INFECTIOUS DISEASE: Follow up culture. Rocephin for now. ID eval.     HEMATOLOGICAL: DVT prophylaxis.    ENDOCRINE:  Follow up FS. Target -180.     MUSCULOSKELETAL: Wound care nurse eval.     In current state, will not benefit from MICU, if condition changes, please recall  IMPRESSION:  MICHELLE, likely postobstructive uropathy  UTI  HO Multiple pressure ulcers  Chronic hypoxemic respiratory failure  S/p trach and PEG (complicated by trach dislodgment and replacement twice)  HO Posterior fossa hemorrhage c/b severe IVH with casting of the 4th and 3rd ventricles with hydrocephalus, s/p EVD and SOC  HO PICA aneurysm now s/p resection  HO Recurrent c. diff colitis  HO Urinary retention  HO B/L Distal DVT  HO L pneumothorax  HO Aspiration pneumonia  Non verbal and does not track or follow commands at baseline.    PLAN:    CNS: Hold CNS depressants. Pain control as needed    HEENT: Oral care    PULMONARY:  HOB @ 45 degrees. Vent changes: continue with Volume A/C for now. Target SpO2 90-95%, titrate oxygen as tolerated.    CARDIOVASCULAR: Avoid volume overload. Continue home midodrine. ECHO.     GI: GI prophylaxis. PEG feeding as tolerated. Bowel regimen     RENAL: Follow up lytes. Correct as needed. Barney placement. Trend Cr. Renal sono. Avoid nephrotoxins.     INFECTIOUS DISEASE: Follow up culture. Rocephin for now. ID eval.     HEMATOLOGICAL: DVT prophylaxis.  DImer     ENDOCRINE:  Follow up FS. Target -180.     MUSCULOSKELETAL: Wound care nurse eval.     In current state, will not benefit from MICU, if condition changes.  Admit to Vent unit

## 2022-10-04 NOTE — ED PROVIDER NOTE - PROGRESS NOTE DETAILS
TD: Pt found to have clinical urinary distension on exam. POC sono positive for large urinary distension >1.5 liters of urine by visual estimation; US machine battery  at end of exam prior to official measurement quantitatively of volume. Pt found to have moderate hydro b/l kidneys, symmetric on POC sono. Barney 16F placed by nurse Sun with output immediately of >1.3L urine, initially yellow then cloudy white purulent material, and per nurse Sun then blood-tinged material/urine being output. Pt found to have full thickness sacral decub on exam 2 fists in diameter, nonpurulent, no discharge. Pt endorsed to MAR for admission.

## 2022-10-04 NOTE — H&P ADULT - ASSESSMENT
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 diff colitis  -hx of urinary retention with urology follow up on recent admission  -hx of b/l distal DVT  -hx lf L pneumothorax  -hx aspiration pneumonia    who was brought in by EMS from [facility] for evaluation of elevated Cr to 4.2 (baseline 0.9) and BUN to 120 (18 in 01/2022). No other complaints per NH documents. The patient is non verbal at baseline and does not track of follow commands at baseline.    In the ED, POC sono positive for large urinary distension >1.5 liters of urine by visual estimation. Pt found to have moderate hydro b/l kidneys, symmetric on POC sono. Teixeira 16F placed with output immediately of >1.3L urine, initially yellow then cloudy white purulent material, and then blood-tinged material/urine being output. Pt found to have full thickness sacral decub on exam 2 fists in diameter, nonpurulent, no discharge. Initial lab workup revealed Hgb 7.4 (7.1 on recent admission) and K 3.1 (repleted) with AG = 17. UA positive for large leukestrase. Pt is being admitted for MICHELLE and UTI.    #MICHELLE  -Cr 4.2 (baseline ~ 0.9)  - (baseline ~ 120)  -POC sono positive for large urinary distension >1.5 liters of urine by visual estimation. Pt found to have moderate hydro b/l kidneys  -s/p teixeira placement in ED with 1.3L urine drainage  -start LR @ 70cc/hr  -US kidney and bladder  -nephrology consult    #UTI  -urine drainage revealed cloudy white purulent material, and then blood-tinged material/urine being output  -large leukestrase on UA  -f/u urine cultures, blood cultures  -start ceftriaxone   -ID consult    #Full thickness decubital ulcer  -nonpurulent, no discharge  -afebrile  -no elevation in WBC  -burn/wound team eval    #HTN  -continue home meds    #T2DM  -continue home meds     DVT prophylaxis =   Diet =   CODE = prior MOLST     62YOM with PMH of:   -posterior fossa hemorrhage in 2021 complicated by interventricular hemorrhage and hydrocephalus, s/p EVD, SOC and VPS, s/p trach and PEG (complicated by trach dislodgment and replacement twice)  -HTN  -CAD s/p stent on DAPT  -T2DM  -PICA aneurysm s/p resection on 11/11  -hx recurrent c. diff colitis  -hx of urinary retention with urology follow up on recent admission  -hx of b/l distal DVT  -hx lf L pneumothorax  -hx aspiration pneumonia    who was brought in by EMS from [facility] for evaluation of elevated Cr to 4.2 (baseline 0.9) and BUN to 120 (18 in 01/2022). No other complaints per NH documents. The patient is non verbal at baseline and does not track of follow commands at baseline.    In the ED, POC sono positive for large urinary distension >1.5 liters of urine by visual estimation. Pt found to have moderate hydro b/l kidneys, symmetric on POC sono. Teixeira 16F placed with output immediately of >1.3L urine, initially yellow then cloudy white purulent material, and then blood-tinged material/urine being output. Pt found to have full thickness sacral decub on exam 2 fists in diameter, nonpurulent, no discharge. Initial lab workup revealed Hgb 7.4 (7.1 on recent admission) and K 3.1 (repleted) with AG = 17. UA positive for large leukestrase. Pt is being admitted for MICHELLE and UTI.    #MICHELLE  -Cr 4.2 (baseline ~ 0.9)  - (baseline ~ 120)  -POC sono positive for large urinary distension >1.5 liters of urine by visual estimation. Pt found to have moderate hydro b/l kidneys  -s/p teixeira placement in ED with 1.3L urine drainage  -start LR @ 70cc/hr  -f/u urine Na, urine osmo  -US kidney and bladder  -nephrology consult    #UTI  -urine drainage revealed cloudy white purulent material, and then blood-tinged material/urine being output  -large leukestrase on UA  -f/u urine cultures, blood cultures  -start ceftriaxone   -ID consult    #Full thickness decubital ulcer  -nonpurulent, no discharge  -afebrile  -no elevation in WBC  -burn/wound team eval    #hx of hypertension vs. Hypotension  -BP on exam 130/70  -patient takes midodrine 5 q8hrs at home with holding parameters  -monitor BP    #T2DM  -continue home meds     #HLD  -continue atorvastatin 80 daily     DVT prophylaxis = heparin 5000 units   Diet = Glucerna 1.2  CODE = prior MOLST - FULL code as of september 2022     62YOM with PMH of:     -posterior fossa hemorrhage in 2021 complicated by interventricular hemorrhage and hydrocephalus, s/p EVD, SOC and VPS, s/p trach and PEG (complicated by trach dislodgment and replacement twice)  -HTN  -CAD s/p stent on DAPT  -T2DM  -PICA aneurysm s/p resection on 11/11  -hx recurrent c. diff colitis  -hx of urinary retention with urology follow up on recent admission  -hx of b/l distal DVT  -hx lf L pneumothorax  -hx aspiration pneumonia    who was brought in by EMS from [facility] for evaluation of elevated Cr to 4.2 (baseline 0.9) and BUN to 120 (18 in 01/2022). No other complaints per NH documents. The patient is non verbal at baseline and does not track of follow commands at baseline.    In the ED, POC sono positive for large urinary distension >1.5 liters of urine by visual estimation. Pt found to have moderate hydro b/l kidneys, symmetric on POC sono. Teixeira 16F placed with output immediately of >1.3L urine, initially yellow then cloudy white purulent material, and then blood-tinged material/urine being output. Pt found to have full thickness sacral decub on exam 2 fists in diameter, nonpurulent, no discharge. Initial lab workup revealed Hgb 7.4 (7.1 on recent admission) and K 3.1 (repleted) with AG = 17. UA positive for large leukestrase. Pt is being admitted for MICHELLE and UTI.    #MICHELLE  -Cr 4.2 (baseline ~ 0.9)  - (baseline ~ 120)  -POC sono positive for large urinary distension >1.5 liters of urine by visual estimation. Pt found to have moderate hydro b/l kidneys  -s/p teixeira placement in ED with 1.3L urine drainage  -start LR @ 70cc/hr  -f/u urine Na, urine osmo  -US kidney and bladder  -nephrology consult    #UTI  -urine drainage revealed cloudy white purulent material, and then blood-tinged material/urine being output  -large leukestrase on UA  -f/u urine cultures, blood cultures  -start ceftriaxone   -ID consult    #Full thickness decubital ulcer  -nonpurulent, no discharge  -afebrile  -no elevation in WBC  -burn/wound team eval    #hx of hypertension vs. Hypotension  -BP on exam 130/70  -patient takes midodrine 5 q8hrs at home with holding parameters  -monitor BP    #T2DM  -continue lispro before meals and at bedtime  -monitor fingersticks     #HLD  -continue atorvastatin 80 daily     DVT prophylaxis = heparin 5000 units   Diet = Glucerna 1.2  CODE = prior MOLST - FULL code as of september 2022

## 2022-10-04 NOTE — ED PROVIDER NOTE - OBJECTIVE STATEMENT
Patient is a 62-year-old male with a past medical history of posterior fossa hemorrhage (late 2021, complicated by IVH and hydrocephalus, s/p EVD and SOC and VPS, s/p trach and PEG, complicated by trach dislodgement and replacement twice), HTN, CAD s/p stent on DAPT, T2DM, PICA aneurysm s/p resection on 11/11, hx of recurrent c. diff colitis, hx of urinary retention, hx of b/l distal DVT, hx of L pneumothorax, hx of aspiration pneumonia, now BIBA tonight for evaluation of elevated BUN to 120s and creatinine to 4 found on labs performed at pt's residence, [facility] 07:00am yesterday, 10/3/22. No other complaints specified in NH documents, pt is nonverbal, does not track/follow commands at baseline, has baseline creatinine per prior charts of 0.5. Pt has no teixeira catheter in place on arrival in ED.

## 2022-10-04 NOTE — CONSULT NOTE ADULT - ASSESSMENT
62 M with PMH of posterior fossa hemorrhage in 2021, s/p trach and PEG, HTN, CAD (on DAPT), DM2. Now admitted with MICHELLE and UTI.     -Please recheck weight to ensure accuracy (patient was 86 kg 11/23/2021 and is now 68 kg)  -If the current weight is correct, patient is being underfed on tube feed regimen ordered at nursing home   -Would start patient on gravity bolus tube feeds   -Repeat all electrolytes after repletion and do not start tube feeds until full set of electrolytes obtained (BMP/Mag/Phos)   -Once electrolytes have been repleted, start with Glucerna gravity bolus feeds 62 M with PMH of posterior fossa hemorrhage in 2021, s/p trach and PEG, HTN, CAD (on DAPT), DM2. Now admitted with MICHELLE and UTI.     -Please recheck weight to ensure accuracy (patient was 86 kg 11/23/2021 and is now 68 kg)  -If the current weight is correct, patient is being underfed on tube feed regimen ordered at nursing home   -Would start patient on gravity bolus tube feeds   -Repeat all electrolytes after repletion, and do not start tube feeds until full set of electrolytes obtained (BMP/Mag/Phos) and no critical values, patient is high risk for refeeding syndrome   -Once electrolytes have been repleted, start with Glucerna gravity bolus feeds 120 mL every 6 hours with 100 mL free water flushes before and after each feed (will increase to goal once electrolytes are stable)

## 2022-10-04 NOTE — ED ADULT NURSE NOTE - CHIEF COMPLAINT QUOTE
Pt BIBA from [facility] for abnormal labs (BUN and creatinine). Pt trach to vent. hx candida aureus

## 2022-10-05 NOTE — PROGRESS NOTE ADULT - ASSESSMENT
62YOM with PMH of ICH sp trach/PEG, HTN/HLD, CAD, hx of c.diff who was brought in by EMS from [facility] for evaluation of elevated Cr to 4.2 (baseline 0.9) and BUN to 120 (18 in 01/2022). Pt was in acute urinary retention and is now sp teixeira catheter with >1.3L urine output    #MICHELLE, likely post-obstructive  #Urinary retention sp teixeira catheter  #Suspected UTI  UA 58 WBC, Large LE  - Cont monitoring BMP, and replete electrolytes for post-obstructive diuresis  - f/u UCX, BCx  - Cont ceftriaxone 1g q24h  - IVF  - strict I&O  - can restart feeds when electrolytes are corrected   - adjust insulin sliding scale when restarting feeds  - obtain CT A/P when MICHELLE resolves    #Full thickness decubital ulcer, present on admission  -nonpurulent, no discharge  -afebrile  -no elevation in WBC  -burn/wound team eval    #Hypokalemia  #Hypernatremia  - likely secondary to post obstructive diuresis  - K repleted  - on LR 75cc/hr  - can restart feeds when electrolytes are corrected     #hx of hypotension  -BP on exam 130/70  -patient takes midodrine 5 q8hrs at home with holding parameters  -monitor BP    #T2DM  - monitor fingersticks   - when resuming feeds adjust insulin sliding scale when restarting feeds to be before meals    #HLD  -continue atorvastatin 80 daily     #Posterior fossa hemorrhage in 2021 complicated by interventricular hemorrhage and hydrocephalus  Pt sp trach/peg  - Follow Pulm for vent management  - Vent settings: 15/400/40/5    DVT PPX, heparin    #Progress Note Handoff  Pending (specify): Monitor renal function and electrolytes, UCx  Family discussion: Team d/w family regarding management of urinary retention   Disposition: NH.   Code: FULL CODE, reviewed MOLST in chart (black and white) from 9/15/2022 62YOM with PMH of ICH sp trach/PEG, HTN/HLD, CAD, hx of c.diff who was brought in by EMS from [facility] for evaluation of elevated Cr to 4.2 (baseline 0.9) and BUN to 120 (18 in 01/2022). Pt was in acute urinary retention and is now sp teixeira catheter with >1.3L urine output    #MICHELLE, likely post-obstructive  #Urinary retention sp teixeira catheter  #Suspected UTI  UA 58 WBC, Large LE  - Cont monitoring BMP, and replete electrolytes for post-obstructive diuresis  - f/u UCX, BCx  - Cont ceftriaxone 1g q24h  - IVF  - strict I&O  - restarted feeds per nutrition  - adjust insulin sliding scale for before feeds  - obtain CT A/P when MICHELLE resolves    #Full thickness decubital ulcer, present on admission  -nonpurulent, no discharge  -afebrile  -no elevation in WBC  -burn/wound team eval    #Hypokalemia  #Hypernatremia  - likely secondary to post obstructive diuresis  - K repleted  - on LR 75cc/hr  - can restart feeds when electrolytes are corrected     #hx of hypotension  -BP on exam 130/70  -patient takes midodrine 5 q8hrs at home with holding parameters  -monitor BP    #T2DM  - monitor fingersticks   - when resuming feeds adjust insulin sliding scale when restarting feeds to be before meals    #HLD  -continue atorvastatin 80 daily     #Posterior fossa hemorrhage in 2021 complicated by interventricular hemorrhage and hydrocephalus  Pt sp trach/peg  - Follow Pulm for vent management  - Vent settings: 15/400/40/5    DVT PPX, heparin    #Progress Note Handoff  Pending (specify): Monitor renal function and electrolytes, UCx  Family discussion: Team d/w family regarding management of urinary retention   Disposition: NH.   Code: FULL CODE, reviewed MOLST in chart (black and white) from 9/15/2022

## 2022-10-05 NOTE — CONSULT NOTE ADULT - SUBJECTIVE AND OBJECTIVE BOX
ALICIA BARBOUR  62y, Male  Allergy: penicillin (Other)      CHIEF COMPLAINT:   MICHELLE (elevated BUN and Cr) (05 Oct 2022 05:44)      LOS  1d    HPI  HPI:    62YOM with PMH of:   -posterior fossa hemorrhage in  complicated by interventricular hemorrhage and hydrocephalus, s/p EVD, SOC and VPS, s/p trach and PEG (complicated by trach dislodgment and replacement twice)  -HTN  -CAD s/p stent on DAPT  -T2DM  -PICA aneurysm s/p resection on   -hx recurrent c. diff colitis  -hx of urinary retention with urology follow up on recent admission  -hx of b/l distal DVT  -hx lf L pneumothorax  -hx aspiration pneumonia    who was brought in by EMS from [facility] for evaluation of elevated Cr to 4.2 (baseline 0.9) and BUN to 120 (18 in 2022). No other complaints per NH documents. The patient is non verbal at baseline and does not track of follow commands at baseline. Pt has no teixeira catheter on arrival to ED.     In the ED, POC sono positive for large urinary distension >1.5 liters of urine by visual estimation. Pt found to have moderate hydro b/l kidneys, symmetric on POC sono. Teixeira 16F placed with output immediately of >1.3L urine, initially yellow then cloudy white purulent material, and then blood-tinged material/urine being output. Pt found to have full thickness sacral decub on exam 2 fists in diameter, nonpurulent, no discharge. Initial lab workup revealed Hgb 7.4 (7.1 on recent admission) and K 3.1 (repleted) with AG = 17. UA positive for large leukestrase. Pt is being admitted for MICHELLE and UTI.     (04 Oct 2022 08:20)      INFECTIOUS DISEASE HISTORY:  ID consulted for UTI and sacral decub  Remote hx CRE pseudo  Noted to have purulent urine    PMH  PAST MEDICAL & SURGICAL HISTORY:  HTN (hypertension)      Diabetes mellitus          FAMILY HISTORY  non-contributory     SOCIAL HISTORY  Social History:        ROS  unable to obtain history secondary to patient's mental status and/or sedation     VITALS:  T(F): 97.9, Max: 98.3 (10-04-22 @ 18:44)  HR: 95  BP: 121/61  RR: 18Vital Signs Last 24 Hrs  T(C): 36.6 (04 Oct 2022 22:39), Max: 36.8 (04 Oct 2022 18:44)  T(F): 97.9 (04 Oct 2022 22:39), Max: 98.3 (04 Oct 2022 18:44)  HR: 95 (04 Oct 2022 22:39) (89 - 96)  BP: 121/61 (04 Oct 2022 22:39) (121/61 - 139/67)  BP(mean): --  RR: 18 (04 Oct 2022 22:39) (17 - 20)  SpO2: 100% (04 Oct 2022 22:39) (100% - 100%)    Parameters below as of 04 Oct 2022 20:30  Patient On (Oxygen Delivery Method): ventilator        PHYSICAL EXAM:  ***    TESTS & MEASUREMENTS:                        8.2    8.38  )-----------( 361      ( 05 Oct 2022 08:05 )             24.5     10-05    149<H>  |  109  |  x   ----------------------------<  120<H>  2.9<L>   |  21  |  3.2<H>    Ca    8.9      05 Oct 2022 08:05  Mg     2.5     10-05    TPro  5.8<L>  /  Alb  2.4<L>  /  TBili  0.2  /  DBili  x   /  AST  9   /  ALT  7   /  AlkPhos  81  10-05      LIVER FUNCTIONS - ( 05 Oct 2022 08:05 )  Alb: 2.4 g/dL / Pro: 5.8 g/dL / ALK PHOS: 81 U/L / ALT: 7 U/L / AST: 9 U/L / GGT: x           Urinalysis Basic - ( 04 Oct 2022 04:32 )    Color: Light Yellow / Appearance: Clear / S.010 / pH: x  Gluc: x / Ketone: Negative  / Bili: Negative / Urobili: <2 mg/dL   Blood: x / Protein: 30 mg/dL / Nitrite: Negative   Leuk Esterase: Large / RBC: 4 /HPF / WBC 58 /HPF   Sq Epi: x / Non Sq Epi: 7 /HPF / Bacteria: Negative  Blood Gas Venous - Lactate: 0.70 mmol/L (10-04-22 @ 04:28)  Lactate, Blood: 0.8 mmol/L (10-04-22 @ 04:28)      INFECTIOUS DISEASES TESTING  COVID-19 PCR: NotDetec (10-04-22 @ 00:43)  Rapid RVP Result: NotDetec (22 @ 06:33)  COVID-19 PCR: NotDetec (22 @ 07:03)  COVID-19 PCR: NotDetec (22 @ 09:30)  COVID-19 PCR: NotDetec (01-10-22 @ 07:05)  COVID-19 PCR: NotDetec (22 @ 07:28)  Procalcitonin, Serum: 0.15 ng/mL (22 @ 07:23)  COVID-19 PCR: NotDetec (21 @ 06:47)  COVID-19 PCR: NotDetec (21 @ 14:38)   (21 @ 09:00)  COVID-19 PCR: NotDetec (21 @ 11:41)  Rapid RVP Result: NotDetec (21 @ 10:25)  Procalcitonin, Serum: 0.45 ng/mL (21 @ 22:38)  COVID-19 PCR: NotDetec (21 @ 06:13)  COVID-19 PCR: NotDetec (21 @ 05:43)  COVID-19 PCR: NotDetec (21 @ 06:55)  COVID-19 PCR: NotDetec (21 @ 16:09)  Procalcitonin, Serum: 0.34 ng/mL (21 @ 05:56)Rapid RVP Result: NotDetec (21 @ 09:59)  Procalcitonin, Serum: 0.26 ng/mL (21 @ 13:13)  COVID-19 PCR: NotDetec (21 @ 18:00)  COVID-19 PCR: NotDetec (21 @ 17:16)  Procalcitonin, Serum: 0.17 ng/mL (11-12-21 @ 19:21)  COVID-19 PCR: NotDetec (21 @ 01:21)  Hepatitis C Virus Interpretation: Nonreact (21 @ 09:04)  MRSA PCR Result.: NotDetec (21 @ 02:24)  COVID-19 PCR: NotDetec (21 @ 10:27)      INFLAMMATORY MARKERS      RADIOLOGY & ADDITIONAL TESTS:  I have personally reviewed the last Chest xray  CXR  Xray Chest 1 View- PORTABLE-Urgent:   ACC: [id number] EXAM:  XR CHEST PORTABLE URGENT 1V                          PROCEDURE DATE:  10/04/2022          INTERPRETATION:  Clinical History / Reason for exam: Respiratory   abnormality    Comparison : Chest radiograph 2022.    Technique/Positioning: AP portable.    Findings:    Support devices: Tracheostomy tube is in place. Telemetry leads are   noted. Right-sided tubing is extending into the neck and abdomen.    Cardiac/mediastinum/hilum: Unremarkable.    Lung parenchyma/Pleura: There is a small left pleural effusion. No   definite consolidation or pneumothorax.    Skeleton/soft tissues: Unremarkable.    Impression:    Left-sided pleural effusion is of uncertain etiology.    --- End of Report ---            TONE UNGER MD; Attending Radiologist  This document has been electronically signed. Oct  4 2022  8:19AM (10-04-22 @ 00:08)      CT      CARDIOLOGY TESTING  12 Lead ECG:   Ventricular Rate 88 BPM    Atrial Rate 88 BPM    P-R Interval 272 ms    QRS Duration 88 ms    Q-T Interval 394 ms    QTC Calculation(Bazett) 476 ms    P Axis 74 degrees    R Axis 80 degrees    T Axis 79 degrees    Diagnosis Line Sinus rhythm with 1st degree A-V block  Otherwise normal ECG    Confirmed by Jv Valentino ([id number]) on 10/4/2022 4:25:53 PM (10-04-22 @ 10:09)      MEDICATIONS  acetaminophen     Tablet .. 650 Oral once  aspirin  chewable 81 Oral daily  atorvastatin 80 Oral at bedtime  cefTRIAXone   IVPB 1000 IV Intermittent every 24 hours  ergocalciferol Drops 8000 Oral daily  famotidine  Oral Tab/Cap - Peds 20 Enteral Tube two times a day  ferrous    sulfate Liquid 300 Oral daily  folic acid 1 Oral daily  heparin   Injectable 5000 SubCutaneous every 12 hours  insulin lispro (ADMELOG) corrective regimen sliding scale  SubCutaneous at bedtime  ipratropium 17 MICROgram(s) HFA Inhaler 1 Inhalation every 6 hours  lactated ringers. 1000 IV Continuous <Continuous>  levETIRAcetam 500 Oral two times a day  midodrine. 5 Oral three times a day  multivitamin 1 Oral daily  tamsulosin 0.4 Oral at bedtime        ANTIBIOTICS:  cefTRIAXone   IVPB 1000 milliGRAM(s) IV Intermittent every 24 hours      ALLERGIES:  penicillin (Other)           ALICIA BARBOUR  62y, Male  Allergy: penicillin (Other)      CHIEF COMPLAINT:   MICHELLE (elevated BUN and Cr) (05 Oct 2022 05:44)      LOS  1d    HPI  HPI:    62YOM with PMH of:   -posterior fossa hemorrhage in  complicated by interventricular hemorrhage and hydrocephalus, s/p EVD, SOC and VPS, s/p trach and PEG (complicated by trach dislodgment and replacement twice)  -HTN  -CAD s/p stent on DAPT  -T2DM  -PICA aneurysm s/p resection on   -hx recurrent c. diff colitis  -hx of urinary retention with urology follow up on recent admission  -hx of b/l distal DVT  -hx lf L pneumothorax  -hx aspiration pneumonia    who was brought in by EMS from [facility] for evaluation of elevated Cr to 4.2 (baseline 0.9) and BUN to 120 (18 in 2022). No other complaints per NH documents. The patient is non verbal at baseline and does not track of follow commands at baseline. Pt has no teixeira catheter on arrival to ED.     In the ED, POC sono positive for large urinary distension >1.5 liters of urine by visual estimation. Pt found to have moderate hydro b/l kidneys, symmetric on POC sono. Teixeira 16F placed with output immediately of >1.3L urine, initially yellow then cloudy white purulent material, and then blood-tinged material/urine being output. Pt found to have full thickness sacral decub on exam 2 fists in diameter, nonpurulent, no discharge. Initial lab workup revealed Hgb 7.4 (7.1 on recent admission) and K 3.1 (repleted) with AG = 17. UA positive for large leukestrase. Pt is being admitted for MICHELLE and UTI.     (04 Oct 2022 08:20)      INFECTIOUS DISEASE HISTORY:  ID consulted for UTI and sacral decub  Remote hx CRE pseudo  Noted to have purulent urine after teixeira placed for retention  Afebrile  No leukocytosis     PMH  PAST MEDICAL & SURGICAL HISTORY:  HTN (hypertension)      Diabetes mellitus          FAMILY HISTORY  non-contributory     SOCIAL HISTORY  Social History:        ROS  unable to obtain history secondary to patient's mental status and/or sedation     VITALS:  T(F): 97.9, Max: 98.3 (10-04-22 @ 18:44)  HR: 95  BP: 121/61  RR: 18Vital Signs Last 24 Hrs  T(C): 36.6 (04 Oct 2022 22:39), Max: 36.8 (04 Oct 2022 18:44)  T(F): 97.9 (04 Oct 2022 22:39), Max: 98.3 (04 Oct 2022 18:44)  HR: 95 (04 Oct 2022 22:39) (89 - 96)  BP: 121/61 (04 Oct 2022 22:39) (121/61 - 139/67)  BP(mean): --  RR: 18 (04 Oct 2022 22:39) (17 - 20)  SpO2: 100% (04 Oct 2022 22:39) (100% - 100%)    Parameters below as of 04 Oct 2022 20:30  Patient On (Oxygen Delivery Method): ventilator        PHYSICAL EXAM:  Gen: trach/ vent  CV: RRR  Lungs: Decreased BS at bases  Abd: Soft  sacrum clean large ulcer no purulence or surrounding erythema  teixeira  Neuro: does not follow commands  Skin: no rash   Lines clean, no phlebitis     TESTS & MEASUREMENTS:                        8.2    8.38  )-----------( 361      ( 05 Oct 2022 08:05 )             24.5     10-05    149<H>  |  109  |  x   ----------------------------<  120<H>  2.9<L>   |  21  |  3.2<H>    Ca    8.9      05 Oct 2022 08:05  Mg     2.5     10-05    TPro  5.8<L>  /  Alb  2.4<L>  /  TBili  0.2  /  DBili  x   /  AST  9   /  ALT  7   /  AlkPhos  81  10-05      LIVER FUNCTIONS - ( 05 Oct 2022 08:05 )  Alb: 2.4 g/dL / Pro: 5.8 g/dL / ALK PHOS: 81 U/L / ALT: 7 U/L / AST: 9 U/L / GGT: x           Urinalysis Basic - ( 04 Oct 2022 04:32 )    Color: Light Yellow / Appearance: Clear / S.010 / pH: x  Gluc: x / Ketone: Negative  / Bili: Negative / Urobili: <2 mg/dL   Blood: x / Protein: 30 mg/dL / Nitrite: Negative   Leuk Esterase: Large / RBC: 4 /HPF / WBC 58 /HPF   Sq Epi: x / Non Sq Epi: 7 /HPF / Bacteria: Negative  Blood Gas Venous - Lactate: 0.70 mmol/L (10-04-22 @ 04:28)  Lactate, Blood: 0.8 mmol/L (10-04-22 @ 04:28)      INFECTIOUS DISEASES TESTING  COVID-19 PCR: NotDetec (10-04-22 @ 00:43)  Rapid RVP Result: NotDetec (22 @ 06:33)  COVID-19 PCR: NotDetec (22 @ 07:03)  COVID-19 PCR: NotDetec (22 @ 09:30)  COVID-19 PCR: NotDetec (01-10-22 @ 07:05)  COVID-19 PCR: NotDetec (22 @ 07:28)  Procalcitonin, Serum: 0.15 ng/mL (22 @ 07:23)  COVID-19 PCR: NotDetec (21 @ 06:47)  COVID-19 PCR: NotDetec (21 @ 14:38)   (21 @ 09:00)  COVID-19 PCR: NotDetec (21 @ 11:41)  Rapid RVP Result: NotDetec (21 @ 10:25)  Procalcitonin, Serum: 0.45 ng/mL (21 @ 22:38)  COVID-19 PCR: NotDetec (21 @ 06:13)  COVID-19 PCR: NotDetec (21 @ 05:43)  COVID-19 PCR: NotDetec (21 @ 06:55)  COVID-19 PCR: NotDetec (21 @ 16:09)  Procalcitonin, Serum: 0.34 ng/mL (21 @ 05:56)Rapid RVP Result: NotDetec (21 @ 09:59)  Procalcitonin, Serum: 0.26 ng/mL (21 @ 13:13)  COVID-19 PCR: NotDetec (21 @ 18:00)  COVID-19 PCR: NotDetec (21 @ 17:16)  Procalcitonin, Serum: 0.17 ng/mL (21 @ 19:21)  COVID-19 PCR: NotDetec (21 @ 01:21)  Hepatitis C Virus Interpretation: Nonreact (21 @ 09:04)  MRSA PCR Result.: NotDetec (21 @ 02:24)  COVID-19 PCR: NotDetec (21 @ 10:27)      INFLAMMATORY MARKERS      RADIOLOGY & ADDITIONAL TESTS:  I have personally reviewed the last Chest xray  CXR  Xray Chest 1 View- PORTABLE-Urgent:   ACC: [id number] EXAM:  XR CHEST PORTABLE URGENT 1V                          PROCEDURE DATE:  10/04/2022          INTERPRETATION:  Clinical History / Reason for exam: Respiratory   abnormality    Comparison : Chest radiograph 2022.    Technique/Positioning: AP portable.    Findings:    Support devices: Tracheostomy tube is in place. Telemetry leads are   noted. Right-sided tubing is extending into the neck and abdomen.    Cardiac/mediastinum/hilum: Unremarkable.    Lung parenchyma/Pleura: There is a small left pleural effusion. No   definite consolidation or pneumothorax.    Skeleton/soft tissues: Unremarkable.    Impression:    Left-sided pleural effusion is of uncertain etiology.    --- End of Report ---            TONE UNGER MD; Attending Radiologist  This document has been electronically signed. Oct  4 2022  8:19AM (10-04-22 @ 00:08)      CT      CARDIOLOGY TESTING  12 Lead ECG:   Ventricular Rate 88 BPM    Atrial Rate 88 BPM    P-R Interval 272 ms    QRS Duration 88 ms    Q-T Interval 394 ms    QTC Calculation(Bazett) 476 ms    P Axis 74 degrees    R Axis 80 degrees    T Axis 79 degrees    Diagnosis Line Sinus rhythm with 1st degree A-V block  Otherwise normal ECG    Confirmed by Jv Valentino ([id number]) on 10/4/2022 4:25:53 PM (10-04-22 @ 10:09)      MEDICATIONS  acetaminophen     Tablet .. 650 Oral once  aspirin  chewable 81 Oral daily  atorvastatin 80 Oral at bedtime  cefTRIAXone   IVPB 1000 IV Intermittent every 24 hours  ergocalciferol Drops 8000 Oral daily  famotidine  Oral Tab/Cap - Peds 20 Enteral Tube two times a day  ferrous    sulfate Liquid 300 Oral daily  folic acid 1 Oral daily  heparin   Injectable 5000 SubCutaneous every 12 hours  insulin lispro (ADMELOG) corrective regimen sliding scale  SubCutaneous at bedtime  ipratropium 17 MICROgram(s) HFA Inhaler 1 Inhalation every 6 hours  lactated ringers. 1000 IV Continuous <Continuous>  levETIRAcetam 500 Oral two times a day  midodrine. 5 Oral three times a day  multivitamin 1 Oral daily  tamsulosin 0.4 Oral at bedtime        ANTIBIOTICS:  cefTRIAXone   IVPB 1000 milliGRAM(s) IV Intermittent every 24 hours      ALLERGIES:  penicillin (Other)

## 2022-10-05 NOTE — PROGRESS NOTE ADULT - ASSESSMENT
62YOM with PMH of ICH sp trach/PEG, HTN/HLD, CAD, hx of c.diff who was brought in by EMS from [facility] for evaluation of elevated Cr to 4.2 (baseline 0.9) and BUN to 120 (18 in 01/2022). Pt was in acute urinary retention and is now sp teixeira catheter with >1.3L urine output. Nephro eval for MICHELLE.    Assessment and plan    MICHELLE/ Hx of bladder mass/ ICH s/p trach and peg/ CAD  MICHELLE likely post renal, possible imposed ATN  s/p folley insertion with good UO  hypernatremia and hypokalemia secondary to post obstructive diuresis  c/w LR at 100 cc/hr  continue to replete K  Hx of 2.4 cm bladder lesion in 2021, CT a/p with IV contrast once MICHELLE resolves  urology eval  keep folley  strict i/os  will follow  62YOM with PMH of ICH sp trach/PEG, HTN/HLD, CAD, hx of c.diff who was brought in by EMS from [facility] for evaluation of elevated Cr to 4.2 (baseline 0.9) and BUN to 120 (18 in 01/2022). Pt was in acute urinary retention and is now sp teixeira catheter with >1.3L urine output. Nephro eval for MICHELLE.    Assessment and plan    MICHELLE/ Hx of bladder mass/ ICH s/p trach and peg/ CAD  MICHELLE likely post renal, possible imposed ATN  s/p folley insertion with good UO  hypernatremia and hypokalemia secondary to post obstructive diuresis  c/w LR at 100 cc/hr  continue to replete K keep K > 3.5  Hx of 2.4 cm bladder lesion in 2021, CT a/p with IV contrast once MICHELLE resolves  urology eval  keep folley  strict i/os  will follow

## 2022-10-05 NOTE — PROGRESS NOTE ADULT - SUBJECTIVE AND OBJECTIVE BOX
SUBJECTIVE:    Patient is a 62y old Male who presents with a chief complaint of MICHELLE (elevated BUN and Cr) (05 Oct 2022 09:36)    Overnight Events: Patient is stable, no acute events overnight.  VS within acceptable range, giving good UO.    PAST MEDICAL & SURGICAL HISTORY  HTN (hypertension)    Diabetes mellitus      SOCIAL HISTORY:  Negative for smoking/alcohol/drug use.     ALLERGIES:  penicillin (Other)    MEDICATIONS:  STANDING MEDICATIONS  acetaminophen     Tablet .. 650 milliGRAM(s) Oral once  aspirin  chewable 81 milliGRAM(s) Oral daily  atorvastatin 80 milliGRAM(s) Oral at bedtime  cefTRIAXone   IVPB 1000 milliGRAM(s) IV Intermittent every 24 hours  ergocalciferol Drops 8000 Unit(s) Oral daily  famotidine  Oral Tab/Cap - Peds 20 milliGRAM(s) Enteral Tube two times a day  ferrous    sulfate Liquid 300 milliGRAM(s) Oral daily  folic acid 1 milliGRAM(s) Oral daily  heparin   Injectable 5000 Unit(s) SubCutaneous every 12 hours  insulin lispro (ADMELOG) corrective regimen sliding scale   SubCutaneous at bedtime  ipratropium 17 MICROgram(s) HFA Inhaler 1 Puff(s) Inhalation every 6 hours  lactated ringers. 1000 milliLiter(s) IV Continuous <Continuous>  levETIRAcetam 500 milliGRAM(s) Oral two times a day  midodrine. 5 milliGRAM(s) Oral three times a day  multivitamin 1 Tablet(s) Oral daily  tamsulosin 0.4 milliGRAM(s) Oral at bedtime    PRN MEDICATIONS  ALBUTerol    0.083%. 2.5 milliGRAM(s) Nebulizer once PRN    VITALS:   T(F): 97.9, Max: 98.3 (10-04- @ 18:44)  HR: 91 (89 - 96)  BP: 121/61 (121/61 - 139/67)  RR: 18 (17 - 20)  SpO2: 97% (97% - 100%)    LABS:                        8.2    8.38  )-----------( 361      ( 05 Oct 2022 08:05 )             24.5     10-    149<H>  |  109  |  111<HH>  ----------------------------<  120<H>  2.9<L>   |  21  |  3.2<H>    Ca    8.9      05 Oct 2022 08:05  Phos  4.8     10-05  Mg     2.5     10-05    TPro  5.8<L>  /  Alb  2.4<L>  /  TBili  0.2  /  DBili  x   /  AST  9   /  ALT  7   /  AlkPhos  81  10-05    PT/INR - ( 04 Oct 2022 04:28 )   PT: 12.00 sec;   INR: 1.05 ratio         PTT - ( 04 Oct 2022 04:28 )  PTT:25.4 sec  Urinalysis Basic - ( 04 Oct 2022 04:32 )    Color: Light Yellow / Appearance: Clear / S.010 / pH: x  Gluc: x / Ketone: Negative  / Bili: Negative / Urobili: <2 mg/dL   Blood: x / Protein: 30 mg/dL / Nitrite: Negative   Leuk Esterase: Large / RBC: 4 /HPF / WBC 58 /HPF   Sq Epi: x / Non Sq Epi: 7 /HPF / Bacteria: Negative                    10-04-22 @ 07:01  -  10-05-22 @ 07:00  --------------------------------------------------------  IN: 308 mL / OUT: 4500 mL / NET: -4192 mL      Mode: AC/ CMV (Assist Control/ Continuous Mandatory Ventilation), RR (machine): 15, TV (machine): 400, FiO2: 40, PEEP: 5, ITime: 1, MAP: 8, PIP: 16    PHYSICAL EXAM:  GEN: NAD, comfortable  LUNGS: CTAB, no w/r/r  HEART: RRR, s1 and s2 appreciated, no m/r/g  ABD: soft, NT/ND, +BS  EXT: no edema, PP b/l  NEURO: AAOX0, non verbal, decorticate position SUBJECTIVE:    Patient is a 62y old Male who presents with a chief complaint of MICHELLE (elevated BUN and Cr) (05 Oct 2022 09:36)    Overnight Events: Patient is stable, no acute events overnight.  VS within acceptable range, giving good UO.    PAST MEDICAL & SURGICAL HISTORY  HTN (hypertension)  Diabetes mellitus      SOCIAL HISTORY:  Negative for smoking/alcohol/drug use.     ALLERGIES:  penicillin (Other)    MEDICATIONS:  STANDING MEDICATIONS  acetaminophen     Tablet .. 650 milliGRAM(s) Oral once  aspirin  chewable 81 milliGRAM(s) Oral daily  atorvastatin 80 milliGRAM(s) Oral at bedtime  cefTRIAXone   IVPB 1000 milliGRAM(s) IV Intermittent every 24 hours  ergocalciferol Drops 8000 Unit(s) Oral daily  famotidine  Oral Tab/Cap - Peds 20 milliGRAM(s) Enteral Tube two times a day  ferrous    sulfate Liquid 300 milliGRAM(s) Oral daily  folic acid 1 milliGRAM(s) Oral daily  heparin   Injectable 5000 Unit(s) SubCutaneous every 12 hours  insulin lispro (ADMELOG) corrective regimen sliding scale   SubCutaneous at bedtime  ipratropium 17 MICROgram(s) HFA Inhaler 1 Puff(s) Inhalation every 6 hours  lactated ringers. 1000 milliLiter(s) IV Continuous <Continuous>  levETIRAcetam 500 milliGRAM(s) Oral two times a day  midodrine. 5 milliGRAM(s) Oral three times a day  multivitamin 1 Tablet(s) Oral daily  tamsulosin 0.4 milliGRAM(s) Oral at bedtime    PRN MEDICATIONS  ALBUTerol    0.083%. 2.5 milliGRAM(s) Nebulizer once PRN    VITALS:   T(F): 97.9, Max: 98.3 (10-04- @ 18:44)  HR: 91 (89 - 96)  BP: 121/61 (121/61 - 139/67)  RR: 18 (17 - 20)  SpO2: 97% (97% - 100%)    LABS:                        8.2    8.38  )-----------( 361      ( 05 Oct 2022 08:05 )             24.5     10-    149<H>  |  109  |  111<HH>  ----------------------------<  120<H>  2.9<L>   |  21  |  3.2<H>    Ca    8.9      05 Oct 2022 08:05  Phos  4.8     10-05  Mg     2.5     10-05    TPro  5.8<L>  /  Alb  2.4<L>  /  TBili  0.2  /  DBili  x   /  AST  9   /  ALT  7   /  AlkPhos  81  10-05    PT/INR - ( 04 Oct 2022 04:28 )   PT: 12.00 sec;   INR: 1.05 ratio         PTT - ( 04 Oct 2022 04:28 )  PTT:25.4 sec  Urinalysis Basic - ( 04 Oct 2022 04:32 )    Color: Light Yellow / Appearance: Clear / S.010 / pH: x  Gluc: x / Ketone: Negative  / Bili: Negative / Urobili: <2 mg/dL   Blood: x / Protein: 30 mg/dL / Nitrite: Negative   Leuk Esterase: Large / RBC: 4 /HPF / WBC 58 /HPF   Sq Epi: x / Non Sq Epi: 7 /HPF / Bacteria: Negative                    10-04-22 @ 07:01  -  10-05-22 @ 07:00  --------------------------------------------------------  IN: 308 mL / OUT: 4500 mL / NET: -4192 mL      Mode: AC/ CMV (Assist Control/ Continuous Mandatory Ventilation), RR (machine): 15, TV (machine): 400, FiO2: 40, PEEP: 5, ITime: 1, MAP: 8, PIP: 16    PHYSICAL EXAM:  GEN: NAD, comfortable  LUNGS: CTAB, no w/r/r  HEART: RRR, s1 and s2 appreciated, no m/r/g  ABD: soft, NT/ND, +BS  EXT: no edema, PP b/l  NEURO: AAOX0, non verbal, decorticate position

## 2022-10-05 NOTE — PROGRESS NOTE ADULT - SUBJECTIVE AND OBJECTIVE BOX
Over Night Events: events noted, vent dependant, afebrile    PHYSICAL EXAM    ICU Vital Signs Last 24 Hrs  T(C): 36.8 (04 Oct 2022 18:44), Max: 36.8 (04 Oct 2022 18:44)  T(F): 98.3 (04 Oct 2022 18:44), Max: 98.3 (04 Oct 2022 18:44)  HR: 96 (04 Oct 2022 20:30) (78 - 96)  BP: 133/70 (04 Oct 2022 20:30) (121/66 - 139/67)  RR: 18 (04 Oct 2022 20:30) (17 - 20)  SpO2: 100% (04 Oct 2022 20:30) (100% - 100%)    O2 Parameters below as of 04 Oct 2022 20:30  Patient On (Oxygen Delivery Method): ventilator            General: ill looking  HEENT: trach            Lungs: Bilateral BS  Cardiovascular: Regular   Abdomen: Soft, Positive BS  contracted, no following commands      10-03-22 @ 07:01  -  10-04-22 @ 07:00  --------------------------------------------------------  IN:  Total IN: 0 mL    OUT:    Ureteral Catheter (mL): 1475 mL  Total OUT: 1475 mL    Total NET: -1475 mL      10-04-22 @ 07:01  -  10-05-22 @ 05:44  --------------------------------------------------------  IN:    PRBCs (Packed Red Blood Cells): 308 mL  Total IN: 308 mL    OUT:    Ureteral Catheter (mL): 4500 mL  Total OUT: 4500 mL    Total NET: -4192 mL          LABS:                          7.4    9.75  )-----------( 408      ( 04 Oct 2022 11:26 )             22.1                                               10    143  |  104  |  129<HH>  ----------------------------<  145<H>  2.6<LL>   |  22  |  3.9<H>    Ca    9.3      04 Oct 2022 11:26  Mg     2.8     10-04    TPro  6.2  /  Alb  2.7<L>  /  TBili  0.2  /  DBili  x   /  AST  11  /  ALT  8   /  AlkPhos  90  10-04      PT/INR - ( 04 Oct 2022 04:28 )   PT: 12.00 sec;   INR: 1.05 ratio         PTT - ( 04 Oct 2022 04:28 )  PTT:25.4 sec                                       Urinalysis Basic - ( 04 Oct 2022 04:32 )    Color: Light Yellow / Appearance: Clear / S.010 / pH: x  Gluc: x / Ketone: Negative  / Bili: Negative / Urobili: <2 mg/dL   Blood: x / Protein: 30 mg/dL / Nitrite: Negative   Leuk Esterase: Large / RBC: 4 /HPF / WBC 58 /HPF   Sq Epi: x / Non Sq Epi: 7 /HPF / Bacteria: Negative                                                  LIVER FUNCTIONS - ( 04 Oct 2022 11:26 )  Alb: 2.7 g/dL / Pro: 6.2 g/dL / ALK PHOS: 90 U/L / ALT: 8 U/L / AST: 11 U/L / GGT: x                                                                                               Mode: AC/ CMV (Assist Control/ Continuous Mandatory Ventilation)  RR (machine): 15  TV (machine): 400  FiO2: 40  PEEP: 5  ITime: 1  MAP: 8  PIP: 17                                          MEDICATIONS  (STANDING):  acetaminophen     Tablet .. 650 milliGRAM(s) Oral once  aspirin  chewable 81 milliGRAM(s) Oral daily  atorvastatin 80 milliGRAM(s) Oral at bedtime  cefTRIAXone   IVPB 1000 milliGRAM(s) IV Intermittent every 24 hours  dextrose 5%. 1000 milliLiter(s) (100 mL/Hr) IV Continuous <Continuous>  dextrose 5%. 1000 milliLiter(s) (50 mL/Hr) IV Continuous <Continuous>  dextrose 50% Injectable 25 Gram(s) IV Push once  dextrose 50% Injectable 12.5 Gram(s) IV Push once  dextrose 50% Injectable 25 Gram(s) IV Push once  ergocalciferol Drops 8000 Unit(s) Oral daily  famotidine  Oral Tab/Cap - Peds 20 milliGRAM(s) Enteral Tube two times a day  ferrous    sulfate Liquid 300 milliGRAM(s) Oral daily  folic acid 1 milliGRAM(s) Oral daily  glucagon  Injectable 1 milliGRAM(s) IntraMuscular once  heparin   Injectable 5000 Unit(s) SubCutaneous every 12 hours  insulin lispro (ADMELOG) corrective regimen sliding scale   SubCutaneous at bedtime  ipratropium 17 MICROgram(s) HFA Inhaler 1 Puff(s) Inhalation every 6 hours  lactated ringers. 1000 milliLiter(s) (75 mL/Hr) IV Continuous <Continuous>  levETIRAcetam 500 milliGRAM(s) Oral two times a day  midodrine. 5 milliGRAM(s) Oral three times a day  multivitamin 1 Tablet(s) Oral daily  tamsulosin 0.4 milliGRAM(s) Oral at bedtime    MEDICATIONS  (PRN):  ALBUTerol    0.083%. 2.5 milliGRAM(s) Nebulizer once PRN Wheezing  dextrose Oral Gel 15 Gram(s) Oral once PRN Blood Glucose LESS THAN 70 milliGRAM(s)/deciliter    =

## 2022-10-05 NOTE — PROGRESS NOTE ADULT - SUBJECTIVE AND OBJECTIVE BOX
No acute events overnight. Pt is trached, nonverbal so ROS was deferred    **Patient is FULL CODE. MOLST reviewed and a black/white copy of the form is in the patient's chart. MOLST is from 9/15/2022    VITAL SIGNS:  T(C): 36.6 (10-04-22 @ 22:39), Max: 36.8 (10-04-22 @ 18:44)  HR: 91 (10-05-22 @ 07:30) (89 - 96)  BP: 121/61 (10-04-22 @ 22:39) (121/61 - 139/67)  RR: 18 (10-04-22 @ 22:39) (17 - 20)  SpO2: 97% (10-05-22 @ 07:30) (97% - 100%)      PHYSICAL EXAM:  GENERAL: NAD  NECK: trach  NERVOUS SYSTEM: awake  CHEST/LUNG: Clear to auscultation bilaterally  HEART: Regular rate and rhythm. Normal S1/S1. No murmurs  ABDOMEN: Soft, Nontender, Nondistended; Bowel sounds present  EXTREMITIES:  no LE edema      MEDICATIONS:  MEDICATIONS  (STANDING):  acetaminophen     Tablet .. 650 milliGRAM(s) Oral once  aspirin  chewable 81 milliGRAM(s) Oral daily  atorvastatin 80 milliGRAM(s) Oral at bedtime  cefTRIAXone   IVPB 1000 milliGRAM(s) IV Intermittent every 24 hours  ergocalciferol Drops 8000 Unit(s) Oral daily  famotidine  Oral Tab/Cap - Peds 20 milliGRAM(s) Enteral Tube two times a day  ferrous    sulfate Liquid 300 milliGRAM(s) Oral daily  folic acid 1 milliGRAM(s) Oral daily  heparin   Injectable 5000 Unit(s) SubCutaneous every 12 hours  insulin lispro (ADMELOG) corrective regimen sliding scale   SubCutaneous at bedtime  ipratropium 17 MICROgram(s) HFA Inhaler 1 Puff(s) Inhalation every 6 hours  lactated ringers. 1000 milliLiter(s) (75 mL/Hr) IV Continuous <Continuous>  levETIRAcetam 500 milliGRAM(s) Oral two times a day  midodrine. 5 milliGRAM(s) Oral three times a day  multivitamin 1 Tablet(s) Oral daily  potassium chloride   Powder 40 milliEquivalent(s) Oral every 2 hours  tamsulosin 0.4 milliGRAM(s) Oral at bedtime    MEDICATIONS  (PRN):  ALBUTerol    0.083%. 2.5 milliGRAM(s) Nebulizer once PRN Wheezing      LABS:                        8.2    8.38  )-----------( 361      ( 05 Oct 2022 08:05 )             24.5     10-05    149<H>  |  109  |  111<HH>  ----------------------------<  120<H>  2.9<L>   |  21  |  3.2<H>    Ca    8.9      05 Oct 2022 08:05  Phos  4.8     10-05  Mg     2.5     10-05    TPro  5.8<L>  /  Alb  2.4<L>  /  TBili  0.2  /  DBili  x   /  AST  9   /  ALT  7   /  AlkPhos  81  10-05    PT/INR - ( 04 Oct 2022 04:28 )   PT: 12.00 sec;   INR: 1.05 ratio         PTT - ( 04 Oct 2022 04:28 )  PTT:25.4 sec  Urinalysis Basic - ( 04 Oct 2022 04:32 )    Color: Light Yellow / Appearance: Clear / S.010 / pH: x  Gluc: x / Ketone: Negative  / Bili: Negative / Urobili: <2 mg/dL   Blood: x / Protein: 30 mg/dL / Nitrite: Negative   Leuk Esterase: Large / RBC: 4 /HPF / WBC 58 /HPF   Sq Epi: x / Non Sq Epi: 7 /HPF / Bacteria: Negative

## 2022-10-05 NOTE — PROGRESS NOTE ADULT - ASSESSMENT
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 diff colitis  HO Urinary retention  HO B/L Distal DVT  HO L pneumothorax  HO Aspiration pneumonia  Non verbal and does not track or follow commands at baseline.    PLAN:    CNS: Hold CNS depressants. Pain control as needed    HEENT: Oral care    PULMONARY:  HOB @ 45 degrees. Vent changes: continue with Volume A/C for now. Target SpO2 92 to 96%, titrate oxygen as tolerated. not weanable    CARDIOVASCULAR: Avoid volume overload. Continue home midodrine. keep equal babance    GI: GI prophylaxis. PEG feeding as tolerated. Bowel regimen     RENAL: Follow up lytes. Correct as needed. Barnye placement. Trend Cr. Renal sono. Avoid nephrotoxins. Check Mg, IP    INFECTIOUS DISEASE: Follow up culture. Rocephin for now. ID eval.     HEMATOLOGICAL: DVT prophylaxis.  DImer     ENDOCRINE:  Follow up FS. Target -180.     MUSCULOSKELETAL: Wound care nurse eval.     Vent unit

## 2022-10-06 NOTE — PROGRESS NOTE ADULT - SUBJECTIVE AND OBJECTIVE BOX
Nephrology progress note    Patient is seen and examined, events over the last 24 h noted .    Allergies:  penicillin (Other)    Hospital Medications:   MEDICATIONS  (STANDING):  acetaminophen     Tablet .. 650 milliGRAM(s) Oral once  aspirin  chewable 81 milliGRAM(s) Oral daily  atorvastatin 80 milliGRAM(s) Oral at bedtime  ergocalciferol Drops 8000 Unit(s) Oral daily  famotidine  Oral Tab/Cap - Peds 20 milliGRAM(s) Enteral Tube two times a day  ferrous    sulfate Liquid 300 milliGRAM(s) Oral daily  folic acid 1 milliGRAM(s) Oral daily  heparin   Injectable 5000 Unit(s) SubCutaneous every 12 hours  insulin lispro (ADMELOG) corrective regimen sliding scale   SubCutaneous three times a day before meals  ipratropium 17 MICROgram(s) HFA Inhaler 1 Puff(s) Inhalation every 6 hours  lactated ringers. 1000 milliLiter(s) (100 mL/Hr) IV Continuous <Continuous>  levETIRAcetam 500 milliGRAM(s) Oral two times a day  midodrine. 5 milliGRAM(s) Oral three times a day  multivitamin 1 Tablet(s) Oral daily  tamsulosin 0.4 milliGRAM(s) Oral at bedtime        VITALS:  T(F): 99.1 (10-06-22 @ 05:48), Max: 99.1 (10-06-22 @ 05:48)  HR: 102 (10-06-22 @ 05:48)  BP: 117/65 (10-06-22 @ 11:07)  RR: 20 (10-06-22 @ 05:48)  SpO2: 99% (10-06-22 @ 05:48)  Wt(kg): --    10-04 @ 07:  -  10-05 @ 07:00  --------------------------------------------------------  IN: 308 mL / OUT: 4500 mL / NET: -4192 mL    10-05 @ 07:01  -  10-06 @ 07:00  --------------------------------------------------------  IN: 2375 mL / OUT: 3151 mL / NET: -776 mL    10-06 @ 07:01  -  10-06 @ 11:12  --------------------------------------------------------  IN: 1300 mL / OUT: 0 mL / NET: 1300 mL          PHYSICAL EXAM:  Constitutional: NAD  Neck: Trach  Respiratory: coarse BS+  Cardiovascular: S1, S2, RRR  Gastrointestinal: BS+, soft, NT/ND  Extremities: No peripheral edema  Neurological: lethargy  :  teixeira.   Skin: No rashes  Vascular Access:    LABS:  10-06    153<H>  |  116<H>  |  85<HH>  ----------------------------<  169<H>  3.1<L>   |  24  |  2.0<H>  SODIUM TREND:  Sodium 153 [10-06 @ 07:52]  Sodium 148 [10-05 @ 19:32]  Sodium 149 [10-05 @ 08:05]  Sodium 143 [10-04 @ 11:26]  Sodium 139 [10-04 @ 00:22]      Ca    8.7      06 Oct 2022 07:52  Phos  4.8     10-05  Mg     2.1     10-06    TPro  5.6<L>  /  Alb  2.5<L>  /  TBili  <0.2  /  DBili      /  AST  9   /  ALT  7   /  AlkPhos  78  10-06                          7.7    12.30 )-----------( 398      ( 06 Oct 2022 07:52 )             23.7       Urine Studies:  Urinalysis Basic - ( 04 Oct 2022 04:32 )    Color: Light Yellow / Appearance: Clear / S.010 / pH:   Gluc:  / Ketone: Negative  / Bili: Negative / Urobili: <2 mg/dL   Blood:  / Protein: 30 mg/dL / Nitrite: Negative   Leuk Esterase: Large / RBC: 4 /HPF / WBC 58 /HPF   Sq Epi:  / Non Sq Epi: 7 /HPF / Bacteria: Negative      Osmolality, Random Urine: 260 mos/kg (10-04 @ 07:21)  Sodium, Random Urine: 77.0 mmoL/L (10-04 @ 07:21)  Creatinine, Random Urine: 21 mg/dL (10-04 @ 07:21)  Protein/Creatinine Ratio Calculation: >26.9 Ratio (10-04 @ 07:21)    RADIOLOGY & ADDITIONAL STUDIES:  < from: US Kidney and Bladder (10.04.22 @ 13:17) >  Right kidney: 11.7 x 5.3 x 5.9 cm. Kidney is somewhat echogenic. There is   also fullness of the collecting system.    Left kidney:  11 x 5.9 x 4.6 cm. Somewhat echogenic. Lower pole simple   cyst. Mild fullness.    Urinary bladder: Teixeira catheter within nondistended urinary bladder..        IMPRESSION:    Echogenic kidneys consistent with medical renal disease.    Mild fullness of the collecting systems.    Simple cyst within the left kidney.    < end of copied text >  < from: Xray Chest 1 View- PORTABLE-Urgent (Xray Chest 1 View- PORTABLE-Urgent .) (10.04.22 @ 00:08) >    Left-sided pleural effusion is of uncertain etiology.    < end of copied text >

## 2022-10-06 NOTE — PROGRESS NOTE ADULT - ASSESSMENT
· Assessment	  62 M with PMH of posterior fossa hemorrhage in 2021, s/p trach and PEG, HTN, CAD (on DAPT), DM2. Now admitted with MICHELLE and UTI.     -Please recheck weight to ensure accuracy (patient was 86 kg 11/23/2021 and is now 68 kg) - also, being rehydrated  -If the current weight is correct, patient is being underfed on tube feed regimen ordered at nursing home   - pt started on 120 ml Glucerna 1.2 q6h, low dose in anticipation of severe refeeding syndrome  - need to replete K - received 80 mEq yesterday - will add 20 mEq with each of the next 6 feeds and repeat in am  - add phos to am labs  - check D level - for how long has pt been receiving 8000 IU daily? there is no prior 25oh D level in [facility]

## 2022-10-06 NOTE — PROGRESS NOTE ADULT - ASSESSMENT
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 diff colitis  -hx of urinary retention with urology follow up on recent admission  -hx of b/l distal DVT  -hx lf L pneumothorax  -hx aspiration pneumonia    who was brought in by EMS from [facility] for evaluation of elevated Cr to 4.2 (baseline 0.9)   ID consulted for UTI and sacral ulcer    IMPRESSION  #Fever  # Sepsis ruled out on admission     Afebrile    No leukocytosis   10/4 Blood & Urine cxs NGTD   UA Blood: x / Protein: 30 mg/dL / Nitrite: Negative   Leuk Esterase: Large / RBC: 4 /HPF / WBC 58 /HPF   Sq Epi: x / Non Sq Epi: 7 /HPF / Bacteria: Negative  #Left-sided pleural effusion is of uncertain etiology.  #Trach/PEG  #Abx allergy: penicillin (Other)  #MICHELLE- urinary retention s/p teixeira  < from: US Kidney and Bladder (10.04.22 @ 13:17) >  Echogenic kidneys consistent with medical renal disease.  Mild fullness of the collecting systems.  Creatinine, Serum: 3.2 (10-05-22 @ 08:05)    Weight (kg): 66.4 (10-04-22 @ 22:39)    RECOMMENDATIONS  - Monitor off antibiotics  - Resend BCX  - If fever >101 or hemodynamic compromise, can start Cefepime 1g q12h IV    If any questions, please call or send a message on [id number] Teams  Please continue to update ID with any pertinent new laboratory or radiographic findings

## 2022-10-06 NOTE — PROGRESS NOTE ADULT - ASSESSMENT
AP  62YOM with PMH of ICH sp trach/PEG, HTN/HLD, CAD, hx of c.diff who was brought in by EMS from [facility] for evaluation of elevated Cr to 4.2 (baseline 0.9) and BUN to 120 (18 in 01/2022). Pt was in acute urinary retention and is now sp teixeira catheter with >1.3L urine output    #MICHELLE, likely post-obstructive  #Urinary retention sp teixeira catheter  #Suspected UTI  UA 58 WBC, Large LE  - Cont monitoring BMP, and replete electrolytes for post-obstructive diuresis  - f/u UCX,BCx  - stop ceftriaxone 1g q24h  Cr 4.2 (o/a) > 3.9 > 3.2 >2.8>2.0  switched to half NS 100ml/hr from LR as per nephro    #Hypokalemia: Replete. monitor.     #Full thickness decubital ulcer, present on admission  -nonpurulent, no discharge  -fever 100.5  -wbc 8->12  -BP softer to 100 pre-midodrine, baseline 130  -f/u ID - off abx for now  -cxr, bcx sent  -burn/wound team eval    #hx of hypotension  -patient takes midodrine 5 q8hrs at home with holding parameters  -monitor BP    #T2DM  -continue lispro before meals and at bedtime  -monitor fingersticks     #HLD  -continue atorvastatin 80 daily     #Posterior fossa hemorrhage in 2021 complicated by interventricular hemorrhage and hydrocephalus  Pt sp trach/peg  - Follow Pulm for vent management    DVT PPX, heparin   AP  62YOM with PMH of ICH sp trach/PEG, HTN/HLD, CAD, hx of c.diff who was brought in by EMS from [facility] for evaluation of elevated Cr to 4.2 (baseline 0.9) and BUN to 120 (18 in 01/2022). Pt was in acute urinary retention and is now sp teixeira catheter with >1.3L urine output    #Sepsis?  -fever 100.5  -wbc 8->12  -BP softer to 100 pre-midodrine, baseline 130, 117/65 prebolus, 121/65 after bolus  -f/u ID - off abx for now  -cxr, bcx sent, f/u UCx    #MICHELLE, likely post-obstructive  #Urinary retention sp teixeira catheter  #Suspected UTI  UA 58 WBC, Large LE  - Cont monitoring BMP, and replete electrolytes for post-obstructive diuresis  - f/u UCX,BCx  - stop ceftriaxone 1g q24h  Cr 4.2 (o/a) > 3.9 > 3.2 >2.8>2.0  switched to half NS 100ml/hr from LR as per nephro    #Hypokalemia: Replete. monitor.     #Full thickness decubital ulcer, present on admission  -nonpurulent, no discharge  -burn/wound team eval    #hx of hypotension  -patient takes midodrine 5 q8hrs at home with holding parameters  -monitor BP    #T2DM  -continue lispro before meals and at bedtime  -monitor fingersticks     #HLD  -continue atorvastatin 80 daily     #Posterior fossa hemorrhage in 2021 complicated by interventricular hemorrhage and hydrocephalus  Pt sp trach/peg  - Follow Pulm for vent management    DVT PPX, heparin

## 2022-10-06 NOTE — PROGRESS NOTE ADULT - SUBJECTIVE AND OBJECTIVE BOX
Over Night Events: events noted, vent dependant, renal / ID reviewed    PHYSICAL EXAM    ICU Vital Signs Last 24 Hrs  T(C): 37.3 (06 Oct 2022 05:48), Max: 37.3 (06 Oct 2022 05:48)  T(F): 99.1 (06 Oct 2022 05:48), Max: 99.1 (06 Oct 2022 05:48)  HR: 102 (06 Oct 2022 05:48) (91 - 108)  BP: 101/58 (06 Oct 2022 05:48) (101/58 - 136/63)  RR: 20 (06 Oct 2022 05:48) (18 - 20)  SpO2: 99% (06 Oct 2022 05:48) (97% - 100%)    O2 Parameters below as of 05 Oct 2022 20:45  Patient On (Oxygen Delivery Method): ventilator            General: ill looking  HEENT: trach  Lungs: dec bs both bases  Cardiovascular: HERI 2.6  Abdomen: Soft, Positive BS  Not following commands  RLE swelling      10-04-22 @ 07:01  -  10-05-22 @ 07:00  --------------------------------------------------------  IN:    PRBCs (Packed Red Blood Cells): 308 mL  Total IN: 308 mL    OUT:    Ureteral Catheter (mL): 4500 mL  Total OUT: 4500 mL    Total NET: -4192 mL      10-05-22 @ 07:01  -  10-06-22 @ 05:52  --------------------------------------------------------  IN:    Enteral Tube Flush: 200 mL    Glucerna: 120 mL    IV PiggyBack: 50 mL    Lactated Ringers: 1385 mL  Total IN: 1755 mL    OUT:    Stool (mL): 1 mL    Ureteral Catheter (mL): 1650 mL    Voided (mL): 1500 mL  Total OUT: 3151 mL    Total NET: -1396 mL          LABS:                          8.2    8.38  )-----------( 361      ( 05 Oct 2022 08:05 )             24.5                                               10-05    148<H>  |  110  |  101<HH>  ----------------------------<  141<H>  4.0   |  16<L>  |  2.8<H>    Ca    9.2      05 Oct 2022 19:32  Phos  4.8     10-05  Mg     2.4     10-05    TPro  5.8<L>  /  Alb  2.4<L>  /  TBili  0.2  /  DBili  x   /  AST  9   /  ALT  7   /  AlkPhos  81  10-05                                                                                           LIVER FUNCTIONS - ( 05 Oct 2022 08:05 )  Alb: 2.4 g/dL / Pro: 5.8 g/dL / ALK PHOS: 81 U/L / ALT: 7 U/L / AST: 9 U/L / GGT: x                                                  Culture - Urine (collected 04 Oct 2022 04:32)  Source: Clean Catch Clean Catch (Midstream)  Final Report (05 Oct 2022 15:37):    <10,000 CFU/mL Normal Urogenital Susan    Culture - Blood (collected 04 Oct 2022 04:32)  Source: .Blood Blood-Peripheral  Preliminary Report (05 Oct 2022 18:01):    No growth to date.    Culture - Blood (collected 04 Oct 2022 04:32)  Source: .Blood Blood-Peripheral  Preliminary Report (05 Oct 2022 18:01):    No growth to date.                                                   Mode: AC/ CMV (Assist Control/ Continuous Mandatory Ventilation)  RR (machine): 15  TV (machine): 400  FiO2: 40  PEEP: 5  ITime: 1  MAP: 8  PIP: 19                                          MEDICATIONS  (STANDING):  acetaminophen     Tablet .. 650 milliGRAM(s) Oral once  aspirin  chewable 81 milliGRAM(s) Oral daily  atorvastatin 80 milliGRAM(s) Oral at bedtime  cefTRIAXone   IVPB 1000 milliGRAM(s) IV Intermittent every 24 hours  ergocalciferol Drops 8000 Unit(s) Oral daily  famotidine  Oral Tab/Cap - Peds 20 milliGRAM(s) Enteral Tube two times a day  ferrous    sulfate Liquid 300 milliGRAM(s) Oral daily  folic acid 1 milliGRAM(s) Oral daily  heparin   Injectable 5000 Unit(s) SubCutaneous every 12 hours  insulin lispro (ADMELOG) corrective regimen sliding scale   SubCutaneous three times a day before meals  ipratropium 17 MICROgram(s) HFA Inhaler 1 Puff(s) Inhalation every 6 hours  lactated ringers. 1000 milliLiter(s) (100 mL/Hr) IV Continuous <Continuous>  levETIRAcetam 500 milliGRAM(s) Oral two times a day  midodrine. 5 milliGRAM(s) Oral three times a day  multivitamin 1 Tablet(s) Oral daily  tamsulosin 0.4 milliGRAM(s) Oral at bedtime    MEDICATIONS  (PRN):  ALBUTerol    0.083%. 2.5 milliGRAM(s) Nebulizer once PRN Wheezing

## 2022-10-06 NOTE — PROGRESS NOTE ADULT - SUBJECTIVE AND OBJECTIVE BOX
S  fever to 100.5 this AM  NAONE     O  GENERAL: NAD, lying in bed comfortably  HEAD:  Atraumatic, Normocephalic  EYES: conjunctiva and sclera clear  ENT: Moist mucous membranes  NECK: Supple, No JVD  CHEST/LUNG: +trach  HEART: Regular rate and rhythm; No murmurs, rubs, or gallops  ABDOMEN: Soft, nontender, nondistended  EXTREMITIES:  No clubbing, cyanosis, or edema      Vitals:  ============  T(F): 100 (06 Oct 2022 12:21), Max: 100 (06 Oct 2022 12:21)  HR: 100 (06 Oct 2022 12:21)  BP: 112/67 (06 Oct 2022 12:21)  RR: 20 (06 Oct 2022 12:21)  SpO2: 99% (06 Oct 2022 05:48) (99% - 100%)  temp max in last 48H T(F): , Max: 100 (10-06-22 @ 12:21)    =======================================================  Current Antibiotics:    Other medications:  acetaminophen     Tablet .. 650 milliGRAM(s) Oral once  aspirin  chewable 81 milliGRAM(s) Oral daily  atorvastatin 80 milliGRAM(s) Oral at bedtime  ergocalciferol Drops 8000 Unit(s) Oral daily  famotidine  Oral Tab/Cap - Peds 20 milliGRAM(s) Enteral Tube two times a day  ferrous    sulfate Liquid 300 milliGRAM(s) Oral daily  folic acid 1 milliGRAM(s) Oral daily  heparin   Injectable 5000 Unit(s) SubCutaneous every 12 hours  insulin lispro (ADMELOG) corrective regimen sliding scale   SubCutaneous three times a day before meals  ipratropium 17 MICROgram(s) HFA Inhaler 1 Puff(s) Inhalation every 6 hours  levETIRAcetam 500 milliGRAM(s) Oral two times a day  midodrine. 5 milliGRAM(s) Oral three times a day  multivitamin 1 Tablet(s) Oral daily  potassium chloride   Powder 20 milliEquivalent(s) Oral every 6 hours  sodium chloride 0.45%. 1000 milliLiter(s) IV Continuous <Continuous>  tamsulosin 0.4 milliGRAM(s) Oral at bedtime      =======================================================  Labs:                        7.7    12.30 )-----------( 398      ( 06 Oct 2022 07:52 )             23.7     10-06    153<H>  |  116<H>  |  85<HH>  ----------------------------<  169<H>  3.1<L>   |  24  |  2.0<H>    Ca    8.7      06 Oct 2022 07:52  Phos  4.8     10-05  Mg     2.1     10-06    TPro  5.6<L>  /  Alb  2.5<L>  /  TBili  <0.2  /  DBili  x   /  AST  9   /  ALT  7   /  AlkPhos  78  10-06      Culture - Urine (collected 10-04-22 @ 04:32)  Source: Clean Catch Clean Catch (Midstream)  Final Report (10-05-22 @ 15:37):    <10,000 CFU/mL Normal Urogenital Susan    Culture - Blood (collected 10-04-22 @ 04:32)  Source: .Blood Blood-Peripheral    Culture - Blood (collected 10-04-22 @ 04:32)  Source: .Blood Blood-Peripheral      Creatinine, Serum: 2.0 mg/dL (10-06-22 @ 07:52)  Creatinine, Serum: 2.8 mg/dL (10-05-22 @ 19:32)  Creatinine, Serum: 3.2 mg/dL (10-05-22 @ 08:05)  Creatinine, Serum: 3.9 mg/dL (10-04-22 @ 11:26)  Creatinine, Serum: 4.2 mg/dL (10-04-22 @ 00:22)              WBC Count: 12.30 K/uL (10-06-22 @ 07:52)  WBC Count: 8.38 K/uL (10-05-22 @ 08:05)  WBC Count: 9.75 K/uL (10-04-22 @ 11:26)  WBC Count: 10.01 K/uL (10-04-22 @ 00:22)    COVID-19 PCR: NotDetec (10-04-22 @ 00:43)      Alkaline Phosphatase, Serum: 78 U/L (10-06-22 @ 07:52)  Alkaline Phosphatase, Serum: 81 U/L (10-05-22 @ 08:05)  Alkaline Phosphatase, Serum: 90 U/L (10-04-22 @ 11:26)  Alkaline Phosphatase, Serum: 104 U/L (10-04-22 @ 00:22)  Alanine Aminotransferase (ALT/SGPT): 7 U/L (10-06-22 @ 07:52)  Alanine Aminotransferase (ALT/SGPT): 7 U/L (10-05-22 @ 08:05)  Alanine Aminotransferase (ALT/SGPT): 8 U/L (10-04-22 @ 11:26)  Alanine Aminotransferase (ALT/SGPT): 9 U/L (10-04-22 @ 00:22)  Aspartate Aminotransferase (AST/SGOT): 9 U/L (10-06-22 @ 07:52)  Aspartate Aminotransferase (AST/SGOT): 9 U/L (10-05-22 @ 08:05)  Aspartate Aminotransferase (AST/SGOT): 11 U/L (10-04-22 @ 11:26)  Aspartate Aminotransferase (AST/SGOT): 19 U/L (10-04-22 @ 00:22)  Bilirubin Total, Serum: <0.2 mg/dL (10-06-22 @ 07:52)  Bilirubin Total, Serum: 0.2 mg/dL (10-05-22 @ 08:05)  Bilirubin Total, Serum: 0.2 mg/dL (10-04-22 @ 11:26)  Bilirubin Total, Serum: <0.2 mg/dL (10-04-22 @ 00:22)

## 2022-10-06 NOTE — PROGRESS NOTE ADULT - ASSESSMENT
62YOM with PMH of ICH sp trach/PEG, HTN/HLD, CAD, hx of c.diff who was brought in by EMS from [facility] for evaluation of elevated Cr to 4.2 (baseline 0.9) and BUN to 120 (18 in 01/2022). Pt was in acute urinary retention and is now sp teixeira catheter with >1.3L urine output. Nephro eval for MICHELLE.    MICHELLE/ Hx of bladder mass/ ICH s/p trach and peg/ CAD  MICHELLE likely post renal, possible imposed ATN  s/p folley insertion with good UO  hypernatremia and hypokalemia secondary to post obstructive diuresis  - please replace K to 4.0  -add free water to enteral feeds,   - switch LR to 1/2 NS at 100 cc/hr    Hx of 2.4 cm bladder lesion in 2021, CT a/p with IV contrast once MICHELLE resolves  urology eval  keep folley  strict i/os  will follow

## 2022-10-06 NOTE — PROGRESS NOTE ADULT - SUBJECTIVE AND OBJECTIVE BOX
ALICIA BARBOUR  62y, Male  Allergy: penicillin (Other)      LOS  2d    CHIEF COMPLAINT: MICHELLE (elevated BUN and Cr) (06 Oct 2022 14:02)      INTERVAL EVENTS/HPI  - Per resident fever 100.5, not documented  - T(F): , Max: 100 (10-06-22 @ 12:21)  - Tolerating medication  - WBC Count: 12.30 (10-06-22 @ 07:52) uptrending   WBC Count: 8.38 (10-05-22 @ 08:05)     - Creatinine, Serum: 1.9 (10-06-22 @ 11:34)  Creatinine, Serum: 2.0 (10-06-22 @ 07:52)       ROS  unable to obtain history secondary to patient's mental status and/or sedation     VITALS:  T(F): 100, Max: 100 (10-06-22 @ 12:21)  HR: 100  BP: 112/67  RR: 20Vital Signs Last 24 Hrs  T(C): 37.8 (06 Oct 2022 12:21), Max: 37.8 (06 Oct 2022 12:21)  T(F): 100 (06 Oct 2022 12:21), Max: 100 (06 Oct 2022 12:21)  HR: 100 (06 Oct 2022 12:21) (96 - 108)  BP: 112/67 (06 Oct 2022 12:21) (101/58 - 135/74)  BP(mean): --  RR: 20 (06 Oct 2022 12:21) (19 - 20)  SpO2: 99% (06 Oct 2022 05:48) (99% - 100%)    Parameters below as of 05 Oct 2022 20:45  Patient On (Oxygen Delivery Method): ventilator        PHYSICAL EXAM:  Gen: trach/ vent  CV: RRR  Lungs: Decreased BS at bases  Abd: Soft  Neuro: does not follow commands  Skin: no rash   Lines clean, no phlebitis   Sacrum deferred     FH: Non-contributory  Social Hx: Non-contributory    TESTS & MEASUREMENTS:                        7.7    12.30 )-----------( 398      ( 06 Oct 2022 07:52 )             23.7     10-06    149<H>  |  112<H>  |  80<HH>  ----------------------------<  167<H>  3.1<L>   |  25  |  1.9<H>    Ca    9.1      06 Oct 2022 11:34  Phos  4.8     10-05  Mg     2.1     10-06    TPro  5.6<L>  /  Alb  2.5<L>  /  TBili  <0.2  /  DBili  x   /  AST  9   /  ALT  7   /  AlkPhos  78  10-06      LIVER FUNCTIONS - ( 06 Oct 2022 07:52 )  Alb: 2.5 g/dL / Pro: 5.6 g/dL / ALK PHOS: 78 U/L / ALT: 7 U/L / AST: 9 U/L / GGT: x               Culture - Urine (collected 10-04-22 @ 04:32)  Source: Clean Catch Clean Catch (Midstream)  Final Report (10-05-22 @ 15:37):    <10,000 CFU/mL Normal Urogenital Susan    Culture - Blood (collected 10-04-22 @ 04:32)  Source: .Blood Blood-Peripheral  Preliminary Report (10-05-22 @ 18:01):    No growth to date.    Culture - Blood (collected 10-04-22 @ 04:32)  Source: .Blood Blood-Peripheral  Preliminary Report (10-05-22 @ 18:01):    No growth to date.        Blood Gas Venous - Lactate: 0.70 mmol/L (10-04-22 @ 04:28)  Lactate, Blood: 0.8 mmol/L (10-04-22 @ 04:28)      INFECTIOUS DISEASES TESTING  COVID-19 PCR: NotDetec (10-04-22 @ 00:43)  Rapid RVP Result: NotDetec (01-25-22 @ 06:33)  COVID-19 PCR: NotDetec (01-18-22 @ 07:03)  COVID-19 PCR: NotDetec (01-16-22 @ 09:30)  COVID-19 PCR: NotDetec (01-10-22 @ 07:05)  COVID-19 PCR: NotDetec (01-04-22 @ 07:28)  Procalcitonin, Serum: 0.15 (01-04-22 @ 07:23)  COVID-19 PCR: NotDetec (12-29-21 @ 06:47)  COVID-19 PCR: NotDetec (12-23-21 @ 14:38)  COVID-19 PCR: NotDetec (12-19-21 @ 11:41)  Rapid RVP Result: NotDetec (12-14-21 @ 10:25)  Procalcitonin, Serum: 0.45 (12-13-21 @ 22:38)  COVID-19 PCR: NotDetec (12-13-21 @ 06:13)  COVID-19 PCR: NotDetec (12-08-21 @ 05:43)  COVID-19 PCR: NotDetec (12-06-21 @ 06:55)  COVID-19 PCR: NotDetec (11-29-21 @ 16:09)  Procalcitonin, Serum: 0.34 (11-27-21 @ 05:56)  Rapid RVP Result: NotDetec (11-26-21 @ 09:59)  Procalcitonin, Serum: 0.26 (11-24-21 @ 13:13)  COVID-19 PCR: NotDetec (11-22-21 @ 18:00)  COVID-19 PCR: NotDetec (11-18-21 @ 17:16)  Procalcitonin, Serum: 0.17 (11-12-21 @ 19:21)  COVID-19 PCR: NotDetec (11-09-21 @ 01:21)  MRSA PCR Result.: NotDetec (11-05-21 @ 02:24)  COVID-19 PCR: NotDetec (11-04-21 @ 10:27)  strept    INFLAMMATORY MARKERS      RADIOLOGY & ADDITIONAL TESTS:  I have personally reviewed the last available Chest xray  CXR  Xray Chest 1 View- PORTABLE-Urgent:   ACC: [id number] EXAM:  XR CHEST PORTABLE URGENT 1V                          PROCEDURE DATE:  10/04/2022          INTERPRETATION:  Clinical History / Reason for exam: Respiratory   abnormality    Comparison : Chest radiograph 1/18/2022.    Technique/Positioning: AP portable.    Findings:    Support devices: Tracheostomy tube is in place. Telemetry leads are   noted. Right-sided tubing is extending into the neck and abdomen.    Cardiac/mediastinum/hilum: Unremarkable.    Lung parenchyma/Pleura: There is a small left pleural effusion. No   definite consolidation or pneumothorax.    Skeleton/soft tissues: Unremarkable.    Impression:    Left-sided pleural effusion is of uncertain etiology.    --- End of Report ---            TONE UNGER MD; Attending Radiologist  This document has been electronically signed. Oct  4 2022  8:19AM (10-04-22 @ 00:08)      CT      CARDIOLOGY TESTING  12 Lead ECG:   Ventricular Rate 88 BPM    Atrial Rate 88 BPM    P-R Interval 272 ms    QRS Duration 88 ms    Q-T Interval 394 ms    QTC Calculation(Bazett) 476 ms    P Axis 74 degrees    R Axis 80 degrees    T Axis 79 degrees    Diagnosis Line Sinus rhythm with 1st degree A-V block  Otherwise normal ECG    Confirmed by Jv Valentino ([id number]) on 10/4/2022 4:25:53 PM (10-04-22 @ 10:09)      MEDICATIONS  acetaminophen     Tablet .. 650 Oral once  aspirin  chewable 81 Oral daily  atorvastatin 80 Oral at bedtime  ergocalciferol Drops 8000 Oral daily  famotidine  Oral Tab/Cap - Peds 20 Enteral Tube two times a day  ferrous    sulfate Liquid 300 Oral daily  folic acid 1 Oral daily  heparin   Injectable 5000 SubCutaneous every 12 hours  insulin lispro (ADMELOG) corrective regimen sliding scale  SubCutaneous three times a day before meals  ipratropium 17 MICROgram(s) HFA Inhaler 1 Inhalation every 6 hours  levETIRAcetam 500 Oral two times a day  midodrine. 5 Oral three times a day  multivitamin 1 Oral daily  potassium chloride   Powder 20 Oral every 6 hours  sodium chloride 0.45%. 1000 IV Continuous <Continuous>  tamsulosin 0.4 Oral at bedtime      WEIGHT  Weight (kg): 66.4 (10-04-22 @ 22:39)  Creatinine, Serum: 1.9 mg/dL (10-06-22 @ 11:34)  Creatinine, Serum: 2.0 mg/dL (10-06-22 @ 07:52)  Creatinine, Serum: 2.8 mg/dL (10-05-22 @ 19:32)      ANTIBIOTICS:      All available historical records have been reviewed

## 2022-10-06 NOTE — PROGRESS NOTE ADULT - ASSESSMENT
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 diff colitis  HO Urinary retention  HO B/L Distal DVT  HO L pneumothorax  HO Aspiration pneumonia  Non verbal and does not track or follow commands at baseline.    PLAN:    CNS: Hold CNS depressants. Pain control as needed    HEENT: Oral care    PULMONARY:  HOB @ 45 degrees. Vent changes: continue with Volume A/C for now. Target SpO2 92 to 96%, titrate oxygen as tolerated. not weanable    CARDIOVASCULAR: Avoid volume overload. Continue home midodrine. IVF    GI: GI prophylaxis. PEG feeding as tolerated. Bowel regimen     RENAL: Follow up lytes. Correct as needed. Barney placement. Trend Cr. po bicarb    INFECTIOUS DISEASE: ABX per ID    HEMATOLOGICAL: DVT prophylaxis. LE doppler    ENDOCRINE:  Follow up FS. Target -180.     MUSCULOSKELETAL: Wound care nurse eval.     Vent unit

## 2022-10-06 NOTE — PROGRESS NOTE ADULT - SUBJECTIVE AND OBJECTIVE BOX
NUTRITION SUPPORT TEAM  -  PROGRESS NOTE     Interval Events:  tolerating feeds  skin turgor somewhat better  + Barney with good U.O.  abd soft, GT in place  + trach, vent dependent O2 40%  cont to receive LR at 100 ml/h    VITALS:  T(F): 99.1 (10-06 @ 05:48), Max: 99.1 (10-06 @ 05:48)  HR: 102 (10-06 @ 05:48) (96 - 102)  BP: 117/65 (10-06 @ 11:07) (101/58 - 117/65)  RR: 20 (10-06 @ 05:48) (20 - 20)  SpO2: 99% (10-06 @ 05:48) (99% - 99%)    HEIGHT/WEIGHT/BMI:   Height (cm): 180.3 (10-04), 177.8 (11-23)  Weight (kg): 66.4 (10-04), 86.8 (11-23)  BMI (kg/m2): 20.4 (10-04), 27.5 (11-23)    I/Os:     10-05-22 @ 07:01  -  10-06-22 @ 07:00  --------------------------------------------------------  IN:    Enteral Tube Flush: 400 mL    Glucerna: 240 mL    IV PiggyBack: 50 mL    Lactated Ringers: 1685 mL  Total IN: 2375 mL    OUT:    Stool (mL): 1 mL    Ureteral Catheter (mL): 1650 mL    Voided (mL): 1500 mL  Total OUT: 3151 mL    Total NET: -776 mL    STANDING MEDICATIONS:   acetaminophen     Tablet .. 650 milliGRAM(s) Oral once  aspirin  chewable 81 milliGRAM(s) Oral daily  atorvastatin 80 milliGRAM(s) Oral at bedtime  ergocalciferol Drops 8000 Unit(s) Oral daily  famotidine  Oral Tab/Cap - Peds 20 milliGRAM(s) Enteral Tube two times a day  ferrous    sulfate Liquid 300 milliGRAM(s) Oral daily  folic acid 1 milliGRAM(s) Oral daily  heparin   Injectable 5000 Unit(s) SubCutaneous every 12 hours  insulin lispro (ADMELOG) corrective regimen sliding scale   SubCutaneous three times a day before meals  ipratropium 17 MICROgram(s) HFA Inhaler 1 Puff(s) Inhalation every 6 hours  lactated ringers. 1000 milliLiter(s) IV Continuous <Continuous>  levETIRAcetam 500 milliGRAM(s) Oral two times a day  midodrine. 5 milliGRAM(s) Oral three times a day  multivitamin 1 Tablet(s) Oral daily  potassium chloride   Powder 20 milliEquivalent(s) Oral every 6 hours  tamsulosin 0.4 milliGRAM(s) Oral at bedtime    LABS:                         7.7    12.30 )-----------( 398      ( 06 Oct 2022 07:52 )             23.7     153<H>  |  116<H>  |  85<HH>  ----------------------------<  169<H>          (10-06-22 @ 07:52)  3.1<L>   |  24  |  2.0<H>    Ca    8.7          (10-06-22 @ 07:52)  Phos  4.8         (10-05-22 @ 08:53)  Mg     2.1         (10-06-22 @ 07:52)    TPro  5.6<L>  /  Alb  2.5<L>  /  TBili  <0.2  /  DBili  x   /  AST  9   /  ALT  7   /  AlkPhos  78       10-06-22 @ 07:52    Blood Glucose (Past 24 hours):  171 mg/dL (10-06 @ 11:42)  143 mg/dL (10-06 @ 05:53)  137 mg/dL (10-05 @ 23:38)  137 mg/dL (10-05 @ 17:18)  124 mg/dL (10-05 @ 11:59)    DIET: revised and reordered now to the following:  Diet, NPO with Tube Feed:   Tube Feeding Modality: Gastrostomy  Glucerna 1.2 Blake  Total Volume for 24 Hours (mL): 960  Bolus  Total Volume of Bolus (mL):  240  Tube Feed Frequency: Every 6 hours   Tube Feed Start Time: 12:00  Bolus Feed Rate (mL per Hour): 500   Bolus Feed Duration (in Hours): 0.5  Free Water Flush Instructions:  water by syringe push 50 ml before and 150 ml after each feeing (10-06-22 @ 11:55) [Active]

## 2022-10-07 NOTE — PROGRESS NOTE ADULT - ASSESSMENT
AP  62YOM with PMH of ICH sp trach/PEG, HTN/HLD, CAD, hx of c.diff who was brought in by EMS from [facility] for evaluation of elevated Cr to 4.2 (baseline 0.9) and BUN to 120 (18 in 01/2022). Pt was in acute urinary retention and is now sp teixeira catheter with >1.3L urine output    #MICHELLE, likely post-obstructive  #Urinary retention sp teixeira catheter  #Suspected UTI  #SIRS (Low grade temp, tachycardia, leukocytosis noted on 10/6); Not POA  UA 58 WBC, Large LE  - Cont monitoring BMP, and replete electrolytes for post-obstructive diuresis  - f/u UCX,BCx - negative from 10/4  - D/cony ceftriaxone per ID on 10/6 to watch off ABx.   Cr 4.2 (o/a) > 3.9 > 3.2 >1.9 . Change to 1/2 NS @ 100 (For hypernatremia)-> D5 at 100    #Why RLE precautions bracelet? :   Check LE duplex. Check with patient's NH -no answer     #Hypokalemia: Replete. monitor.     #Full thickness decubital ulcer, present on admission  -nonpurulent, no discharge  -afebrile  -no elevation in WBC  -burn/wound team eval    #hx of hypotension  -BP on exam 130/70  -patient takes midodrine 5 q8hrs at home with holding parameters  -monitor BP    #T2DM  -continue lispro before meals and at bedtime  -monitor fingersticks     #HLD  -continue atorvastatin 80 daily     #Posterior fossa hemorrhage in 2021 complicated by interventricular hemorrhage and hydrocephalus  Pt sp trach/peg  - Follow Pulm for vent management    DVT PPX, heparin AP  62YOM with PMH of ICH sp trach/PEG, HTN/HLD, CAD, hx of c.diff who was brought in by EMS from [facility] for evaluation of elevated Cr to 4.2 (baseline 0.9) and BUN to 120 (18 in 01/2022). Pt was in acute urinary retention and is now sp teixeira catheter with >1.3L urine output    #MICHELLE, likely post-obstructive  #Urinary retention sp teixeira catheter  #Suspected UTI  #SIRS (Low grade temp, tachycardia, leukocytosis noted on 10/6); no fever today  UA 58 WBC, Large LE  - Cont monitoring BMP, and replete electrolytes for post-obstructive diuresis  - f/u UCX,BCx - negative from 10/4  - D/cony ceftriaxone per ID on 10/6 to watch off ABx, continue to moniutor  Cr 4.2 (o/a) > 3.9 > 3.2 >1.9 . Change to 1/2 NS @ 100 (For hypernatremia)-> D5 at 100    #Why RLE precautions bracelet? :   Check LE duplex. Check with patient's NH -no answer     #Hypokalemia: Replete. monitor.     #Full thickness decubital ulcer, present on admission  -nonpurulent, no discharge  -afebrile  -no elevation in WBC  -burn/wound team eval    #hx of hypotension  -BP on exam 130/70  -patient takes midodrine 5 q8hrs at home with holding parameters  -monitor BP    #T2DM  -continue lispro before meals and at bedtime  -monitor fingersticks     #HLD  -continue atorvastatin 80 daily     #Posterior fossa hemorrhage in 2021 complicated by interventricular hemorrhage and hydrocephalus  Pt sp trach/peg  - Follow Pulm for vent management    DVT PPX, heparin

## 2022-10-07 NOTE — PROGRESS NOTE ADULT - SUBJECTIVE AND OBJECTIVE BOX
Over Night Events: events noted, vent dependant, afebrile, renal reviewed    PHYSICAL EXAM    ICU Vital Signs Last 24 Hrs  T(C): 36.1 (07 Oct 2022 05:35), Max: 37.8 (06 Oct 2022 12:21)  T(F): 96.9 (07 Oct 2022 05:35), Max: 100 (06 Oct 2022 12:21)  HR: 85 (07 Oct 2022 05:35) (79 - 100)  BP: 106/54 (07 Oct 2022 05:35) (106/54 - 121/65)  RR: 18 (07 Oct 2022 05:35) (18 - 20)  SpO2: 100% (07 Oct 2022 02:25) (100% - 100%)    O2 Parameters below as of 06 Oct 2022 20:14  Patient On (Oxygen Delivery Method): ventilator            General: Ill looking  HEENT: trach       Lungs: Bilateral BS  Cardiovascular: Regular   Abdomen: Soft, Positive BS  Extremities: No clubbing   not following commands      10-05-22 @ 07:01  -  10-06-22 @ 07:00  --------------------------------------------------------  IN:    Enteral Tube Flush: 400 mL    Glucerna: 240 mL    IV PiggyBack: 50 mL    Lactated Ringers: 1685 mL  Total IN: 2375 mL    OUT:    Stool (mL): 1 mL    Ureteral Catheter (mL): 1650 mL    Voided (mL): 1500 mL  Total OUT: 3151 mL    Total NET: -776 mL      10-06-22 @ 07:01  -  10-07-22 @ 05:59  --------------------------------------------------------  IN:    Enteral Tube Flush: 600 mL    Free Water: 900 mL    Glucerna: 600 mL    Lactated Ringers: 600 mL    Lactated Ringers Bolus: 1000 mL    sodium chloride 0.45%: 1300 mL  Total IN: 5000 mL    OUT:    Ureteral Catheter (mL): 1700 mL  Total OUT: 1700 mL    Total NET: 3300 mL          LABS:                          7.7    12.30 )-----------( 398      ( 06 Oct 2022 07:52 )             23.7                                               10-06    155<H>  |  121<H>  |  66<HH>  ----------------------------<  120<H>  3.7   |  23  |  1.5    Ca    9.0      06 Oct 2022 23:24  Phos  4.8     10-05  Mg     2.1     10-06    TPro  5.7<L>  /  Alb  2.5<L>  /  TBili  0.2  /  DBili  x   /  AST  12  /  ALT  6   /  AlkPhos  71  10-06                                                                                           LIVER FUNCTIONS - ( 06 Oct 2022 23:24 )  Alb: 2.5 g/dL / Pro: 5.7 g/dL / ALK PHOS: 71 U/L / ALT: 6 U/L / AST: 12 U/L / GGT: x                                                                                               Mode: AC/ CMV (Assist Control/ Continuous Mandatory Ventilation)  RR (machine): 15  TV (machine): 400  FiO2: 40  PEEP: 5  ITime: 1  MAP: 8  PIP: 20                                          MEDICATIONS  (STANDING):  acetaminophen     Tablet .. 650 milliGRAM(s) Oral once  aspirin  chewable 81 milliGRAM(s) Oral daily  atorvastatin 80 milliGRAM(s) Oral at bedtime  ergocalciferol Drops 8000 Unit(s) Oral daily  famotidine  Oral Tab/Cap - Peds 20 milliGRAM(s) Enteral Tube two times a day  ferrous    sulfate Liquid 300 milliGRAM(s) Oral daily  folic acid 1 milliGRAM(s) Oral daily  heparin   Injectable 5000 Unit(s) SubCutaneous every 12 hours  insulin lispro (ADMELOG) corrective regimen sliding scale   SubCutaneous three times a day before meals  ipratropium 17 MICROgram(s) HFA Inhaler 1 Puff(s) Inhalation every 6 hours  levETIRAcetam 500 milliGRAM(s) Oral two times a day  midodrine. 5 milliGRAM(s) Oral three times a day  multivitamin 1 Tablet(s) Oral daily  potassium chloride   Powder 20 milliEquivalent(s) Oral every 6 hours  sodium chloride 0.45%. 1000 milliLiter(s) (100 mL/Hr) IV Continuous <Continuous>  tamsulosin 0.4 milliGRAM(s) Oral at bedtime    MEDICATIONS  (PRN):  ALBUTerol    0.083%. 2.5 milliGRAM(s) Nebulizer once PRN Wheezing

## 2022-10-07 NOTE — CONSULT NOTE ADULT - NS ATTEND AMEND GEN_ALL_CORE FT
Agree with above  Will discuss with the patient's wife if she wants patient to undergo cystoscopy possible TURBT.  Apparently, this was offered to her previously but refused.

## 2022-10-07 NOTE — CONSULT NOTE ADULT - ASSESSMENT
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  Urology called to evaluate Pt. for CT A/P finding from (2021) Symmetric renal enhancement without hydronephrosis. Bilateral renal cysts and hypodensities too small to characterize. A 1.7 cm exophytic left upper pole hypoattenuating lesion is indeterminate.  A 2.4 cm nodular soft tissue density along the right posterior bladder wall appears separate from the prostate gland and not seen on pelvic CT from 2017. Enlarged prostate protruding into the base of bladder.  At that time pt. was evaluated at [facility] by urology Dr. Oglesby recommended medical clearance for cystoscopy, TURBT but  Pt's wife preferred to monitor Pt. for neurologic recovery after brain surgery.   Pt. was not able to f/u as outpatient for bladder mass since that time.   As per wife pt. had Barney catheter since the time of Brain surgery (2021) but catheter was removed at NH about 2 1/2 weeks ago at this admission   Pt. was found with Urinary retention and  large Urine OP when Barney catheter placed in ED.     Spoke with Pt's wife over the phone call medical information obtained from Pt's wife and medical records. Pt. is not able to provide medical history, mechanically ventilated.     RBUS (10/04/22)Echogenic kidneys consistent with medical renal disease. Mild fullness of the collecting systems. Simple cyst within the left kidney. Urinary bladder: Barney catheter within nondistended urinary bladder.    Plan:  Cont. Barney catheter drain to gravity, strict I&O   Monitor BUN/Cr   Consider repeat PSA levels   Cont. Flomax   Consider CT A/P to f/u on 2.4 cm nodular soft tissue density along the right posterior bladder wall  UDS as an outpatient   Will d/w  attending

## 2022-10-07 NOTE — PROGRESS NOTE ADULT - SUBJECTIVE AND OBJECTIVE BOX
Nephrology progress note    THIS IS AN INCOMPLETE NOTE . FULL NOTE TO FOLLOW SHORTLY    Patient is seen and examined, events over the last 24 h noted .    Allergies:  penicillin (Other)    Hospital Medications:   MEDICATIONS  (STANDING):  acetaminophen     Tablet .. 650 milliGRAM(s) Oral once  aspirin  chewable 81 milliGRAM(s) Oral daily  atorvastatin 80 milliGRAM(s) Oral at bedtime  dextrose 5%. 1000 milliLiter(s) (100 mL/Hr) IV Continuous <Continuous>  ergocalciferol Drops 8000 Unit(s) Oral daily  famotidine  Oral Tab/Cap - Peds 20 milliGRAM(s) Enteral Tube two times a day  ferrous    sulfate Liquid 300 milliGRAM(s) Oral daily  folic acid 1 milliGRAM(s) Oral daily  heparin   Injectable 5000 Unit(s) SubCutaneous every 12 hours  insulin lispro (ADMELOG) corrective regimen sliding scale   SubCutaneous three times a day before meals  ipratropium 17 MICROgram(s) HFA Inhaler 1 Puff(s) Inhalation every 6 hours  levETIRAcetam 500 milliGRAM(s) Oral two times a day  midodrine. 5 milliGRAM(s) Oral three times a day  multivitamin 1 Tablet(s) Oral daily  potassium chloride   Powder 20 milliEquivalent(s) Oral every 6 hours  tamsulosin 0.4 milliGRAM(s) Oral at bedtime        VITALS:  T(F): 96.9 (10-07-22 @ 05:35), Max: 100 (10-06-22 @ 12:21)  HR: 82 (10-07-22 @ 07:40)  BP: 106/54 (10-07-22 @ 05:35)  RR: 20 (10-07-22 @ 07:40)  SpO2: 100% (10-07-22 @ 07:40)  Wt(kg): --    10-05 @ 07:01  -  10-06 @ 07:00  --------------------------------------------------------  IN: 2375 mL / OUT: 3151 mL / NET: -776 mL    10-06 @ 07:01  -  10-07 @ 07:00  --------------------------------------------------------  IN: 5000 mL / OUT: 2500 mL / NET: 2500 mL          PHYSICAL EXAM:  Constitutional: NAD  HEENT: anicteric sclera, oropharynx clear, MMM  Neck: No JVD  Respiratory: CTAB, no wheezes, rales or rhonchi  Cardiovascular: S1, S2, RRR  Gastrointestinal: BS+, soft, NT/ND  Extremities: No cyanosis or clubbing. No peripheral edema  :  No teixeira.   Skin: No rashes    LABS:  10-06    155<H>  |  121<H>  |  66<HH>  ----------------------------<  120<H>  3.7   |  23  |  1.5    SODIUM TREND:  Sodium 155 [10-06 @ 23:24]  Sodium 149 [10-06 @ 11:34]  Sodium 153 [10-06 @ 07:52]  Sodium 148 [10-05 @ 19:32]  Sodium 149 [10-05 @ 08:05]  Sodium 143 [10-04 @ 11:26]  Sodium 139 [10-04 @ 00:22]    Ca    9.0      06 Oct 2022 23:24  Mg     2.1     10-06    TPro  5.7<L>  /  Alb  2.5<L>  /  TBili  0.2  /  DBili      /  AST  12  /  ALT  6   /  AlkPhos  71  10-06                          7.7    12.30 )-----------( 398      ( 06 Oct 2022 07:52 )             23.7       Urine Studies:  Urinalysis Basic - ( 04 Oct 2022 04:32 )    Color: Light Yellow / Appearance: Clear / S.010 / pH:   Gluc:  / Ketone: Negative  / Bili: Negative / Urobili: <2 mg/dL   Blood:  / Protein: 30 mg/dL / Nitrite: Negative   Leuk Esterase: Large / RBC: 4 /HPF / WBC 58 /HPF   Sq Epi:  / Non Sq Epi: 7 /HPF / Bacteria: Negative      Osmolality, Random Urine: 260 mos/kg (10-04 @ 07:21)  Sodium, Random Urine: 77.0 mmoL/L (10-04 @ 07:21)  Creatinine, Random Urine: 21 mg/dL (10-04 @ 07:21)  Protein/Creatinine Ratio Calculation: >26.9 Ratio (10-04 @ 07:)      TSH 1.09      [21 @ 15:24]  Lipid: chol 154, TG 73, HDL 44, LDL --      [21 @ 01:10]    HCV 0.09, Nonreact      [21 @ 09:04]        RADIOLOGY & ADDITIONAL STUDIES:   Nephrology progress note    Patient is seen and examined, events over the last 24 h noted .  on MV trached     Allergies:  penicillin (Other)    Hospital Medications:   MEDICATIONS  (STANDING):  acetaminophen     Tablet .. 650 milliGRAM(s) Oral once  aspirin  chewable 81 milliGRAM(s) Oral daily  atorvastatin 80 milliGRAM(s) Oral at bedtime  dextrose 5%. 1000 milliLiter(s) (100 mL/Hr) IV Continuous <Continuous>  ergocalciferol Drops 8000 Unit(s) Oral daily  famotidine  Oral Tab/Cap - Peds 20 milliGRAM(s) Enteral Tube two times a day  ferrous    sulfate Liquid 300 milliGRAM(s) Oral daily  folic acid 1 milliGRAM(s) Oral daily  heparin   Injectable 5000 Unit(s) SubCutaneous every 12 hours  insulin lispro (ADMELOG) corrective regimen sliding scale   SubCutaneous three times a day before meals  ipratropium 17 MICROgram(s) HFA Inhaler 1 Puff(s) Inhalation every 6 hours  levETIRAcetam 500 milliGRAM(s) Oral two times a day  midodrine. 5 milliGRAM(s) Oral three times a day  multivitamin 1 Tablet(s) Oral daily  potassium chloride   Powder 20 milliEquivalent(s) Oral every 6 hours  tamsulosin 0.4 milliGRAM(s) Oral at bedtime        VITALS:  T(F): 96.9 (10-07-22 @ 05:35), Max: 100 (10-06-22 @ 12:21)  HR: 82 (10-07-22 @ 07:40)  BP: 106/54 (10-07-22 @ 05:35)  RR: 20 (10-07-22 @ 07:40)  SpO2: 100% (10-07-22 @ 07:40)      10-05 @ 07:01  -  10-06 @ 07:00  --------------------------------------------------------  IN: 2375 mL / OUT: 3151 mL / NET: -776 mL    10-06 @ 07:01  -  10-07 @ 07:00  --------------------------------------------------------  IN: 5000 mL / OUT: 2500 mL / NET: 2500 mL          PHYSICAL EXAM:  Constitutional: NAD  Neck: No JVD  Respiratory: CTAB  Cardiovascular: S1, S2, RRR  Gastrointestinal: BS+, soft, NT/ND  Extremities: No cyanosis or clubbing. No peripheral edema  :  No teixeira.   Skin: No rashes    LABS:      10-06    155<H>  |  121<H>  |  66<HH>  ----------------------------<  120<H>  3.7   |  23  |  1.5    SODIUM TREND:  Sodium 155 [10-06 @ 23:24]  Sodium 149 [10-06 @ 11:34]  Sodium 153 [10-06 @ 07:52]  Sodium 148 [10-05 @ 19:32]  Sodium 149 [10-05 @ 08:05]  Sodium 143 [10-04 @ 11:26]  Sodium 139 [10-04 @ 00:22]    Ca    9.0      06 Oct 2022 23:24  Mg     2.1     10-06    TPro  5.7<L>  /  Alb  2.5<L>  /  TBili  0.2  /  DBili      /  AST  12  /  ALT  6   /  AlkPhos  71  10-06                          7.7    12.30 )-----------( 398      ( 06 Oct 2022 07:52 )             23.7       Urine Studies:  Urinalysis Basic - ( 04 Oct 2022 04:32 )    Color: Light Yellow / Appearance: Clear / S.010 / pH:   Gluc:  / Ketone: Negative  / Bili: Negative / Urobili: <2 mg/dL   Blood:  / Protein: 30 mg/dL / Nitrite: Negative   Leuk Esterase: Large / RBC: 4 /HPF / WBC 58 /HPF   Sq Epi:  / Non Sq Epi: 7 /HPF / Bacteria: Negative      Osmolality, Random Urine: 260 mos/kg (10-04 @ 07:21)  Sodium, Random Urine: 77.0 mmoL/L (10-04 @ 07:21)  Creatinine, Random Urine: 21 mg/dL (10-04 @ 07:21)  Protein/Creatinine Ratio Calculation: >26.9 Ratio (10-04 @ 07:21)      TSH 1.09      [21 @ 15:24]  Lipid: chol 154, TG 73, HDL 44, LDL --      [21 @ 01:10]    HCV 0.09, Nonreact      [21 @ 09:04]        RADIOLOGY & ADDITIONAL STUDIES:

## 2022-10-07 NOTE — CONSULT NOTE ADULT - SUBJECTIVE AND OBJECTIVE BOX
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  Urology called to evaluate Pt. for CT A/P finding from (2021) Symmetric renal enhancement without hydronephrosis. Bilateral renal cysts and hypodensities too small to characterize. A 1.7 cm exophytic left upper pole hypoattenuating lesion is indeterminate.  A 2.4 cm nodular soft tissue density along the right posterior bladder wall appears separate from the prostate gland and not seen on pelvic CT from 2017. Enlarged prostate protruding into the base of bladder.  at that time pt. was evaluated at [facility] by Dr. Oglesby recommended medical clearance for cystoscopy, TURBT but at that time Pt's wife preferred to monitor Pt.  neurologic recovery after brain surgery.   Pt. had been not able to f/u as outpatient for bladder mass. As per wife pt. had Teixeira catheter since the time of Brain surgery (2021) but catheter was removed at NH, Pt. was found with large Urine OP when Teixeira catheter placed in ED.     Spoke with Pt's wife over the phone call medical information obtained from Pt's wife and medical records. Pt. is not able to provide medical history, mechanically ventilated.         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 650 milliGRAM(s) Oral once  aspirin  chewable 81 milliGRAM(s) Oral daily  atorvastatin 80 milliGRAM(s) Oral at bedtime  dextrose 5%. 1000 milliLiter(s) (75 mL/Hr) IV Continuous <Continuous>  ergocalciferol Drops 8000 Unit(s) Oral daily  famotidine  Oral Tab/Cap - Peds 20 milliGRAM(s) Enteral Tube two times a day  ferrous    sulfate Liquid 300 milliGRAM(s) Oral daily  folic acid 1 milliGRAM(s) Oral daily  heparin   Injectable 5000 Unit(s) SubCutaneous every 12 hours  insulin lispro (ADMELOG) corrective regimen sliding scale   SubCutaneous three times a day before meals  ipratropium 17 MICROgram(s) HFA Inhaler 1 Puff(s) Inhalation every 6 hours  levETIRAcetam 500 milliGRAM(s) Oral two times a day  magnesium sulfate  IVPB 2 Gram(s) IV Intermittent once  midodrine. 5 milliGRAM(s) Oral three times a day  multivitamin 1 Tablet(s) Oral daily  potassium chloride   Powder 20 milliEquivalent(s) Oral every 6 hours  tamsulosin 0.4 milliGRAM(s) Oral at bedtime    MEDICATIONS  (PRN):  acetaminophen     Tablet .. 650 milliGRAM(s) Oral every 6 hours PRN Temp greater or equal to 38C (100.4F), Mild Pain (1 - 3)  ALBUTerol    0.083%. 2.5 milliGRAM(s) Nebulizer once PRN Wheezing      Allergies: Penicillin (Other)       SOCIAL HISTORY: No illicit drug use, ex-smoker, occasional ETOH use    FAMILY HISTORY:  Stomach Cancer    REVIEW OF SYSTEMS   [x] A ten-point review of systems was otherwise negative except as noted.     Vital Signs Last 24 Hrs  T(C): 37.9 (07 Oct 2022 14:09), Max: 37.9 (07 Oct 2022 14:09)  T(F): 100.3 (07 Oct 2022 14:09), Max: 100.3 (07 Oct 2022 14:09)  HR: 95 (07 Oct 2022 14:09) (79 - 95)  BP: 124/70 (07 Oct 2022 14:09) (106/54 - 124/70)  RR: 20 (07 Oct 2022 07:40) (18 - 20)  SpO2: 100% (07 Oct 2022 17:20) (100% - 100%)    Parameters below as of 07 Oct 2022 07:40  Patient On (Oxygen Delivery Method): ventilator        PHYSICAL EXAM:    GEN: NAD, Awake, not following verbal commands   SKIN: non diaphoretic.  HEENT: NC/AT  RESP: Mechanically ventilated  CARDIO: +S1/S2  ABDO: Soft, NT/ND, no palpable bladder   BACK: No CVAT B/L  : Non circumcised male. B/L descended testicles x 2. No lesions or palpable masses noted B/L. No meatal discharge. + Indwelling teixeira in place, draining yellow urine.       I&O's Summary    06 Oct 2022 07:01  -  07 Oct 2022 07:00  --------------------------------------------------------  IN: 5000 mL / OUT: 2500 mL / NET: 2500 mL        LABS:                        7.8    8.74  )-----------( 338      ( 07 Oct 2022 09:40 )             24.6     10-07    149<H>  |  117<H>  |  53<H>  ----------------------------<  140<H>  4.5   |  23  |  1.1    Ca    8.9      07 Oct 2022 09:40  Phos  2.5     10-07  Mg     1.7     10-07    TPro  5.9<L>  /  Alb  2.6<L>  /  TBili  0.2  /  DBili  x   /  AST  26  /  ALT  10  /  AlkPhos  74  10-07    Urinalysis (10.04.22 @ 04:32)    Glucose Qualitative, Urine: Negative    Blood, Urine: Small    pH Urine: 6.5    Color: Light Yellow    Urine Appearance: Clear    Bilirubin: Negative    Ketone - Urine: Negative    Specific Gravity: 1.010    Protein, Urine: 30 mg/dL    Urobilinogen: <2 mg/dL    Nitrite: Negative    Leukocyte Esterase Concentration: Large    Urine Microscopic-Add On (NC) (10.04.22 @ 04:32)    Red Blood Cell - Urine: 4 /HPF    White Blood Cell - Urine: 58 /HPF    Hyaline Casts: 1 /LPF    Bacteria: Negative    Epithelial Cells: 7 /HPF        Culture - Urine (10.04.22 @ 04:32)    Specimen Source: Clean Catch Clean Catch (Midstream)    Culture Results:   <10,000 CFU/mL Normal Urogenital Susan    RADIOLOGY & ADDITIONAL STUDIES:  < from: CT Abdomen and Pelvis w/ IV Cont (12.16.21 @ 09:26) >    ACC: [id number] EXAM:  CT ABDOMEN AND PELVIS IC                        ACC: [id number] EXAM:  CT CHEST IC                          PROCEDURE DATE:  12/16/2021          INTERPRETATION:  CLINICAL INFORMATION: Fever of unknown origin.    COMPARISON: Pelvic CT 7/13/2017.    CONTRAST/COMPLICATIONS:  IV Contrast: Omnipaque 350  90 cc administered   10 cc discarded  Oral Contrast: NONE  Complications: None reported at time of study completion    PROCEDURE:  CT of the Chest, Abdomen and Pelvis was performed.  Sagittal and coronal reformats were performed.    FINDINGS:  CHEST:  LUNGS AND LARGE AIRWAYS: Status post tracheostomy. Bibasilar subsegmental   atelectasis.  PLEURA: No pleural effusion.  VESSELS: Coronary artery calcification.  HEART: Heart sizeis normal. No pericardial effusion.  MEDIASTINUM AND ANABELLA: No lymphadenopathy.  CHEST WALL AND LOWER NECK: Partially imaged lesion catheter coursing in   the right anterior subcutaneous tissues.    ABDOMEN AND PELVIS:  LIVER: Within normal limits.  BILE DUCTS: Normal caliber.  GALLBLADDER: Within normal limits.  SPLEEN: Within normal limits.  PANCREAS: Within normal limits.  ADRENALS: Within normal limits.  KIDNEYS/URETERS: Symmetric renal enhancement without hydronephrosis.   Bilateral renal cysts and hypodensities too small to characterize. A 1.7   cm exophytic left upper pole hypoattenuating lesion is indeterminate.    BLADDER: A 2.4 cm nodular soft tissue density along the right posterior   bladder wall appears separate from the prostate gland and not seen on   pelvic CT from 2017.  REPRODUCTIVE ORGANS: Enlarged prostate protruding into the base of   bladder.    BOWEL: PEG tube terminates in the stomach. Mild infiltration along the   PEG tube catheter without drainable fluid collection. No bowel   obstruction. Appendix is not visualized.  PERITONEUM: No ascites.  shunt catheter terminating in the left   hemipelvis.  VESSELS: Within normal limits.  RETROPERITONEUM/LYMPH NODES: No lymphadenopathy. Mild presacral edema.  ABDOMINAL WALL:  shunt catheter coursing in the right anterior   abdominal wall.  BONES: Degenerative changes. A 2.7 x 1.2 cm fluid collection adjacent to   the right ischial tuberosity.    IMPRESSION:  A 2.4 cm nodular soft tissue density in the bladder appears separate from   the prostate gland, concerning for bladder lesion. Correlate with   cystoscopy.    A 2.7 cm fluid collection adjacent to the right ischial tuberosity, may   be related to ischiogluteal bursitis.    Indeterminant 1.7 cm left renal lesion. Contrast-enhanced MRI can be   performed further evaluation.    Mild fatty infiltration along the PEG tube catheter without drainable   fluid collection.      --- End of Report ---    BROOKS SANDRA MD; Attending Radiologist  This document has been electronically signed. Dec 16 2021 10:14AM    < end of copied text >      < from: US Kidney and Bladder (10.04.22 @ 13:17) >    ACC: [id number] EXAM:  US KIDNEYS AND BLADDER                          PROCEDURE DATE:  10/04/2022          INTERPRETATION:  CLINICAL INFORMATION: Worsening renal function.    COMPARISON: December 16, 2021    TECHNIQUE: Sonography of the kidneys and bladder.    FINDINGS:    Right kidney: 11.7 x 5.3 x 5.9 cm. Kidney is somewhat echogenic. There is   also fullness of the collecting system.    Left kidney:  11 x 5.9 x 4.6 cm. Somewhat echogenic. Lower pole simple   cyst. Mild fullness.    Urinary bladder: Teixeira catheter within nondistended urinary bladder..        IMPRESSION:    Echogenic kidneys consistent with medical renal disease.    Mild fullness of the collecting systems.    Simple cyst within the left kidney.        --- End of Report ---      ANDRES CUI MD; Attending Interventional Radiologist  This document has been electronically signed. Oct  4 2022  1:55PM    < end of copied text >  ? 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  Urology called to evaluate Pt. for CT A/P finding from (2021) Symmetric renal enhancement without hydronephrosis. Bilateral renal cysts and hypodensities too small to characterize. A 1.7 cm exophytic left upper pole hypoattenuating lesion is indeterminate.  A 2.4 cm nodular soft tissue density along the right posterior bladder wall appears separate from the prostate gland and not seen on pelvic CT from 2017. Enlarged prostate protruding into the base of bladder.  at that time pt. was evaluated at [facility] by Dr. Oglesby recommended medical clearance for cystoscopy, TURBT but at that time Pt's wife preferred to monitor Pt.  neurologic recovery after brain surgery.   Pt. had been not able to f/u as outpatient for bladder mass. As per wife pt. had Teixeira catheter since the time of Brain surgery (2021) but catheter was removed at NH, Pt. was found with large Urine OP when Teixeira catheter placed in ED.     Spoke with Pt's wife over the phone call medical information obtained from Pt's wife and medical records. Pt. is not able to provide medical history, mechanically ventilated.         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 650 milliGRAM(s) Oral once  aspirin  chewable 81 milliGRAM(s) Oral daily  atorvastatin 80 milliGRAM(s) Oral at bedtime  dextrose 5%. 1000 milliLiter(s) (75 mL/Hr) IV Continuous <Continuous>  ergocalciferol Drops 8000 Unit(s) Oral daily  famotidine  Oral Tab/Cap - Peds 20 milliGRAM(s) Enteral Tube two times a day  ferrous    sulfate Liquid 300 milliGRAM(s) Oral daily  folic acid 1 milliGRAM(s) Oral daily  heparin   Injectable 5000 Unit(s) SubCutaneous every 12 hours  insulin lispro (ADMELOG) corrective regimen sliding scale   SubCutaneous three times a day before meals  ipratropium 17 MICROgram(s) HFA Inhaler 1 Puff(s) Inhalation every 6 hours  levETIRAcetam 500 milliGRAM(s) Oral two times a day  magnesium sulfate  IVPB 2 Gram(s) IV Intermittent once  midodrine. 5 milliGRAM(s) Oral three times a day  multivitamin 1 Tablet(s) Oral daily  potassium chloride   Powder 20 milliEquivalent(s) Oral every 6 hours  tamsulosin 0.4 milliGRAM(s) Oral at bedtime    MEDICATIONS  (PRN):  acetaminophen     Tablet .. 650 milliGRAM(s) Oral every 6 hours PRN Temp greater or equal to 38C (100.4F), Mild Pain (1 - 3)  ALBUTerol    0.083%. 2.5 milliGRAM(s) Nebulizer once PRN Wheezing      Allergies: Penicillin (Other)       SOCIAL HISTORY: No illicit drug use, ex-smoker, occasional ETOH use    FAMILY HISTORY:  Stomach Cancer    REVIEW OF SYSTEMS   [x] A ten-point review of systems was otherwise negative except as noted.     Vital Signs Last 24 Hrs  T(C): 37.9 (07 Oct 2022 14:09), Max: 37.9 (07 Oct 2022 14:09)  T(F): 100.3 (07 Oct 2022 14:09), Max: 100.3 (07 Oct 2022 14:09)  HR: 95 (07 Oct 2022 14:09) (79 - 95)  BP: 124/70 (07 Oct 2022 14:09) (106/54 - 124/70)  RR: 20 (07 Oct 2022 07:40) (18 - 20)  SpO2: 100% (07 Oct 2022 17:20) (100% - 100%)    Parameters below as of 07 Oct 2022 07:40  Patient On (Oxygen Delivery Method): ventilator        PHYSICAL EXAM:    GEN: NAD, Awake, not following verbal commands   SKIN: non diaphoretic.  HEENT: NC/AT  RESP: Mechanically ventilated  CARDIO: +S1/S2  ABDO: Soft, NT/ND, no palpable bladder   BACK: No CVAT B/L  : Non circumcised male. Scrotum no edema or erythema , large Right testicle, unable to fully palpate left ?atrophic left. No meatal discharge. + Indwelling teixeira in place, draining yellow urine.       I&O's Summary    06 Oct 2022 07:01  -  07 Oct 2022 07:00  --------------------------------------------------------  IN: 5000 mL / OUT: 2500 mL / NET: 2500 mL        LABS:                        7.8    8.74  )-----------( 338      ( 07 Oct 2022 09:40 )             24.6     10-07    149<H>  |  117<H>  |  53<H>  ----------------------------<  140<H>  4.5   |  23  |  1.1    Ca    8.9      07 Oct 2022 09:40  Phos  2.5     10-07  Mg     1.7     10-07    TPro  5.9<L>  /  Alb  2.6<L>  /  TBili  0.2  /  DBili  x   /  AST  26  /  ALT  10  /  AlkPhos  74  10-07    Urinalysis (10.04.22 @ 04:32)    Glucose Qualitative, Urine: Negative    Blood, Urine: Small    pH Urine: 6.5    Color: Light Yellow    Urine Appearance: Clear    Bilirubin: Negative    Ketone - Urine: Negative    Specific Gravity: 1.010    Protein, Urine: 30 mg/dL    Urobilinogen: <2 mg/dL    Nitrite: Negative    Leukocyte Esterase Concentration: Large    Urine Microscopic-Add On (NC) (10.04.22 @ 04:32)    Red Blood Cell - Urine: 4 /HPF    White Blood Cell - Urine: 58 /HPF    Hyaline Casts: 1 /LPF    Bacteria: Negative    Epithelial Cells: 7 /HPF        Culture - Urine (10.04.22 @ 04:32)    Specimen Source: Clean Catch Clean Catch (Midstream)    Culture Results:   <10,000 CFU/mL Normal Urogenital Susan    RADIOLOGY & ADDITIONAL STUDIES:  < from: CT Abdomen and Pelvis w/ IV Cont (12.16.21 @ 09:26) >    ACC: [id number] EXAM:  CT ABDOMEN AND PELVIS IC                        ACC: [id number] EXAM:  CT CHEST IC                          PROCEDURE DATE:  12/16/2021          INTERPRETATION:  CLINICAL INFORMATION: Fever of unknown origin.    COMPARISON: Pelvic CT 7/13/2017.    CONTRAST/COMPLICATIONS:  IV Contrast: Omnipaque 350  90 cc administered   10 cc discarded  Oral Contrast: NONE  Complications: None reported at time of study completion    PROCEDURE:  CT of the Chest, Abdomen and Pelvis was performed.  Sagittal and coronal reformats were performed.    FINDINGS:  CHEST:  LUNGS AND LARGE AIRWAYS: Status post tracheostomy. Bibasilar subsegmental   atelectasis.  PLEURA: No pleural effusion.  VESSELS: Coronary artery calcification.  HEART: Heart sizeis normal. No pericardial effusion.  MEDIASTINUM AND ANABELLA: No lymphadenopathy.  CHEST WALL AND LOWER NECK: Partially imaged lesion catheter coursing in   the right anterior subcutaneous tissues.    ABDOMEN AND PELVIS:  LIVER: Within normal limits.  BILE DUCTS: Normal caliber.  GALLBLADDER: Within normal limits.  SPLEEN: Within normal limits.  PANCREAS: Within normal limits.  ADRENALS: Within normal limits.  KIDNEYS/URETERS: Symmetric renal enhancement without hydronephrosis.   Bilateral renal cysts and hypodensities too small to characterize. A 1.7   cm exophytic left upper pole hypoattenuating lesion is indeterminate.    BLADDER: A 2.4 cm nodular soft tissue density along the right posterior   bladder wall appears separate from the prostate gland and not seen on   pelvic CT from 2017.  REPRODUCTIVE ORGANS: Enlarged prostate protruding into the base of   bladder.    BOWEL: PEG tube terminates in the stomach. Mild infiltration along the   PEG tube catheter without drainable fluid collection. No bowel   obstruction. Appendix is not visualized.  PERITONEUM: No ascites.  shunt catheter terminating in the left   hemipelvis.  VESSELS: Within normal limits.  RETROPERITONEUM/LYMPH NODES: No lymphadenopathy. Mild presacral edema.  ABDOMINAL WALL:  shunt catheter coursing in the right anterior   abdominal wall.  BONES: Degenerative changes. A 2.7 x 1.2 cm fluid collection adjacent to   the right ischial tuberosity.    IMPRESSION:  A 2.4 cm nodular soft tissue density in the bladder appears separate from   the prostate gland, concerning for bladder lesion. Correlate with   cystoscopy.    A 2.7 cm fluid collection adjacent to the right ischial tuberosity, may   be related to ischiogluteal bursitis.    Indeterminant 1.7 cm left renal lesion. Contrast-enhanced MRI can be   performed further evaluation.    Mild fatty infiltration along the PEG tube catheter without drainable   fluid collection.      --- End of Report ---    BROOKS SANDRA MD; Attending Radiologist  This document has been electronically signed. Dec 16 2021 10:14AM    < end of copied text >      < from: US Kidney and Bladder (10.04.22 @ 13:17) >    ACC: [id number] EXAM:  US KIDNEYS AND BLADDER                          PROCEDURE DATE:  10/04/2022          INTERPRETATION:  CLINICAL INFORMATION: Worsening renal function.    COMPARISON: December 16, 2021    TECHNIQUE: Sonography of the kidneys and bladder.    FINDINGS:    Right kidney: 11.7 x 5.3 x 5.9 cm. Kidney is somewhat echogenic. There is   also fullness of the collecting system.    Left kidney:  11 x 5.9 x 4.6 cm. Somewhat echogenic. Lower pole simple   cyst. Mild fullness.    Urinary bladder: Teixeira catheter within nondistended urinary bladder..        IMPRESSION:    Echogenic kidneys consistent with medical renal disease.    Mild fullness of the collecting systems.    Simple cyst within the left kidney.        --- End of Report ---      ANDRES CUI MD; Attending Interventional Radiologist  This document has been electronically signed. Oct  4 2022  1:55PM    < end of copied text >  ?

## 2022-10-07 NOTE — PROGRESS NOTE ADULT - ASSESSMENT
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 diff colitis  HO Urinary retention  HO B/L Distal DVT  HO L pneumothorax  HO Aspiration pneumonia  Non verbal and does not track or follow commands at baseline.    PLAN:    CNS: Hold CNS depressants. Pain control as needed    HEENT: Oral care    PULMONARY:  HOB @ 45 degrees. Vent changes: continue with Volume A/C for now. Target SpO2 92 to 96%, titrate oxygen as tolerated. not weanable    CARDIOVASCULAR: Avoid volume overload. Continue home midodrine. IVF change to D 5W    GI: GI prophylaxis. PEG feeding as tolerated. Bowel regimen     RENAL: Follow up lytes. Correct as needed. Barney placement. Trend Cr.     INFECTIOUS DISEASE: ABX per ID    HEMATOLOGICAL: DVT prophylaxis. LE doppler -    ENDOCRINE:  Follow up FS. Target -180.     MUSCULOSKELETAL: Wound care nurse eval.     Vent unit

## 2022-10-07 NOTE — PROGRESS NOTE ADULT - ASSESSMENT
62YOM with PMH of ICH sp trach/PEG, HTN/HLD, CAD, hx of c.diff who was brought in by EMS from [facility] for evaluation of elevated Cr to 4.2 (baseline 0.9) and BUN to 120 (18 in 01/2022). Pt was in acute urinary retention and is now sp teixeira catheter with >1.3L urine output. Nephro eval for MICHELLE.    MICHELLE/ Hx of bladder mass/ ICH s/p trach and peg/ CAD  MICHELLE likely post renal, possible imposed ATN  s/p folley insertion with good UO  hypernatremia and hypokalemia secondary to post obstructive diuresis  - please replace K to 4.0  -add free water to enteral feeds,   - continue 1/2 NS at 75 cc per hour  - free water via  cc q6h   - repeat BMP   Hx of 2.4 cm bladder lesion in 2021, CT a/p with IV contrast once MICHELLE resolves  urology eval  keep folley  strict i/os  will follow

## 2022-10-07 NOTE — PROGRESS NOTE ADULT - SUBJECTIVE AND OBJECTIVE BOX
S  Na evening 155, given free water flush    O  GENERAL: NAD, lying in bed comfortably  HEAD:  Atraumatic, Normocephalic  EYES: conjunctiva and sclera clear  ENT: Moist mucous membranes  NECK: Supple, No JVD  CHEST/LUNG: Clear to auscultation bilaterally; No rales, rhonchi, wheezing, or rubs. Unlabored respirations  HEART: Regular rate and rhythm; No murmurs, rubs, or gallops  ABDOMEN: Soft, nontender, nondistended  EXTREMITIES:  No clubbing, cyanosis, or edema  NERVOUS SYSTEM:  A&Ox3    Vitals:  ============  T(F): 96.9 (07 Oct 2022 05:35), Max: 100 (06 Oct 2022 12:21)  HR: 86 (07 Oct 2022 07:55)  BP: 106/54 (07 Oct 2022 05:35)  RR: 20 (07 Oct 2022 07:40)  SpO2: 100% (07 Oct 2022 07:55) (100% - 100%)  temp max in last 48H T(F): , Max: 100 (10-06-22 @ 12:21)    =======================================================  Current Antibiotics:    Other medications:  acetaminophen     Tablet .. 650 milliGRAM(s) Oral once  aspirin  chewable 81 milliGRAM(s) Oral daily  atorvastatin 80 milliGRAM(s) Oral at bedtime  dextrose 5%. 1000 milliLiter(s) IV Continuous <Continuous>  ergocalciferol Drops 8000 Unit(s) Oral daily  famotidine  Oral Tab/Cap - Peds 20 milliGRAM(s) Enteral Tube two times a day  ferrous    sulfate Liquid 300 milliGRAM(s) Oral daily  folic acid 1 milliGRAM(s) Oral daily  heparin   Injectable 5000 Unit(s) SubCutaneous every 12 hours  insulin lispro (ADMELOG) corrective regimen sliding scale   SubCutaneous three times a day before meals  ipratropium 17 MICROgram(s) HFA Inhaler 1 Puff(s) Inhalation every 6 hours  levETIRAcetam 500 milliGRAM(s) Oral two times a day  midodrine. 5 milliGRAM(s) Oral three times a day  multivitamin 1 Tablet(s) Oral daily  potassium chloride   Powder 20 milliEquivalent(s) Oral every 6 hours  tamsulosin 0.4 milliGRAM(s) Oral at bedtime      =======================================================  Labs:                        7.7    12.30 )-----------( 398      ( 06 Oct 2022 07:52 )             23.7     10-06    155<H>  |  121<H>  |  66<HH>  ----------------------------<  120<H>  3.7   |  23  |  1.5    Ca    9.0      06 Oct 2022 23:24  Mg     2.1     10-06    TPro  5.7<L>  /  Alb  2.5<L>  /  TBili  0.2  /  DBili  x   /  AST  12  /  ALT  6   /  AlkPhos  71  10-06      Culture - Urine (collected 10-04-22 @ 04:32)  Source: Clean Catch Clean Catch (Midstream)  Final Report (10-05-22 @ 15:37):    <10,000 CFU/mL Normal Urogenital Susan    Culture - Blood (collected 10-04-22 @ 04:32)  Source: .Blood Blood-Peripheral    Culture - Blood (collected 10-04-22 @ 04:32)  Source: .Blood Blood-Peripheral      Creatinine, Serum: 1.5 mg/dL (10-06-22 @ 23:24)  Creatinine, Serum: 1.9 mg/dL (10-06-22 @ 11:34)  Creatinine, Serum: 2.0 mg/dL (10-06-22 @ 07:52)  Creatinine, Serum: 2.8 mg/dL (10-05-22 @ 19:32)  Creatinine, Serum: 3.2 mg/dL (10-05-22 @ 08:05)  Creatinine, Serum: 3.9 mg/dL (10-04-22 @ 11:26)  Creatinine, Serum: 4.2 mg/dL (10-04-22 @ 00:22)    Procalcitonin, Serum: 0.33 ng/mL (10-06-22 @ 11:34)            WBC Count: 12.30 K/uL (10-06-22 @ 07:52)  WBC Count: 8.38 K/uL (10-05-22 @ 08:05)  WBC Count: 9.75 K/uL (10-04-22 @ 11:26)  WBC Count: 10.01 K/uL (10-04-22 @ 00:22)    COVID-19 PCR: NotDetec (10-04-22 @ 00:43)      Alkaline Phosphatase, Serum: 71 U/L (10-06-22 @ 23:24)  Alkaline Phosphatase, Serum: 78 U/L (10-06-22 @ 07:52)  Alkaline Phosphatase, Serum: 81 U/L (10-05-22 @ 08:05)  Alkaline Phosphatase, Serum: 90 U/L (10-04-22 @ 11:26)  Alkaline Phosphatase, Serum: 104 U/L (10-04-22 @ 00:22)  Alanine Aminotransferase (ALT/SGPT): 6 U/L (10-06-22 @ 23:24)  Alanine Aminotransferase (ALT/SGPT): 7 U/L (10-06-22 @ 07:52)  Alanine Aminotransferase (ALT/SGPT): 7 U/L (10-05-22 @ 08:05)  Alanine Aminotransferase (ALT/SGPT): 8 U/L (10-04-22 @ 11:26)  Alanine Aminotransferase (ALT/SGPT): 9 U/L (10-04-22 @ 00:22)  Aspartate Aminotransferase (AST/SGOT): 12 U/L (10-06-22 @ 23:24)  Aspartate Aminotransferase (AST/SGOT): 9 U/L (10-06-22 @ 07:52)  Aspartate Aminotransferase (AST/SGOT): 9 U/L (10-05-22 @ 08:05)  Aspartate Aminotransferase (AST/SGOT): 11 U/L (10-04-22 @ 11:26)  Aspartate Aminotransferase (AST/SGOT): 19 U/L (10-04-22 @ 00:22)  Bilirubin Total, Serum: 0.2 mg/dL (10-06-22 @ 23:24)  Bilirubin Total, Serum: <0.2 mg/dL (10-06-22 @ 07:52)  Bilirubin Total, Serum: 0.2 mg/dL (10-05-22 @ 08:05)  Bilirubin Total, Serum: 0.2 mg/dL (10-04-22 @ 11:26)  Bilirubin Total, Serum: <0.2 mg/dL (10-04-22 @ 00:22)

## 2022-10-07 NOTE — CONSULT NOTE ADULT - CONSULT REASON
F/U on bladder mass Dx in 2021 but lost to F/U
vent management
"assistance w tub feeds"
MICHELLE
UTI decub

## 2022-10-07 NOTE — CONSULT NOTE ADULT - REASON FOR ADMISSION
MICHELLE (elevated BUN and Cr)

## 2022-10-08 NOTE — PROGRESS NOTE ADULT - ASSESSMENT
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 diff colitis  HO Urinary retention  HO B/L Distal DVT  HO L pneumothorax  HO Aspiration pneumonia  Non verbal and does not track or follow commands at baseline.    PLAN:    CNS: Hold CNS depressants. Pain control as needed    HEENT: Oral care    PULMONARY:  HOB @ 45 degrees. Vent changes: continue with Volume A/C for now. Target SpO2 92 to 96%, titrate oxygen as tolerated. not weanable    CARDIOVASCULAR: Avoid volume overload. Continue home midodrine. IVF change to D 5W    GI: GI prophylaxis. PEG feeding as tolerated. Bowel regimen     RENAL: Follow up lytes. Correct as needed. Barney placement. Trend Cr.   follow Na level     INFECTIOUS DISEASE: ABX per ID    HEMATOLOGICAL: DVT prophylaxis. LE doppler -    ENDOCRINE:  Follow up FS. Target -180.     MUSCULOSKELETAL: Wound care nurse eval.     Vent unit

## 2022-10-08 NOTE — PROGRESS NOTE ADULT - SUBJECTIVE AND OBJECTIVE BOX
S: no new evnets/complaints    All other pertinent ROS negative.      10-07-22 @ 07:01  -  10-08-22 @ 07:00  --------------------------------------------------------  IN: 3420 mL / OUT: 1250 mL / NET: 2170 mL    10-08-22 @ 07:01  -  10-08-22 @ 17:58  --------------------------------------------------------  IN: 1190 mL / OUT: 0 mL / NET: 1190 mL      Vital Signs Last 24 Hrs  T(C): 37.1 (08 Oct 2022 14:00), Max: 37.7 (07 Oct 2022 20:00)  T(F): 98.7 (08 Oct 2022 14:00), Max: 99.9 (07 Oct 2022 20:00)  HR: 89 (08 Oct 2022 14:50) (81 - 99)  BP: 118/70 (08 Oct 2022 14:00) (109/68 - 127/67)  BP(mean): 82 (07 Oct 2022 20:00) (82 - 82)  RR: 18 (08 Oct 2022 14:00) (18 - 20)  SpO2: 99% (08 Oct 2022 14:50) (99% - 100%)    Parameters below as of 07 Oct 2022 20:00  Patient On (Oxygen Delivery Method): ventilator      PHYSICAL EXAM:    Constitutional: NAD, trach/PEG  HEENT: PERR, EOMI, Normal Hearing, MMM  Neck: Soft and supple, No LAD, No JVD  Respiratory: Breath sounds are clear bilaterally, No wheezing, rales or rhonchi  Cardiovascular: S1 and S2, regular rate and rhythm, no Murmurs, gallops or rubs  Gastrointestinal: Bowel Sounds present, soft, nontender, nondistended, no guarding, no rebound  Extremities: No peripheral edema      MEDICATIONS:  MEDICATIONS  (STANDING):  acetaminophen     Tablet .. 650 milliGRAM(s) Oral once  aspirin  chewable 81 milliGRAM(s) Oral daily  atorvastatin 80 milliGRAM(s) Oral at bedtime  dextrose 5%. 1000 milliLiter(s) (75 mL/Hr) IV Continuous <Continuous>  ergocalciferol Drops 8000 Unit(s) Oral daily  famotidine  Oral Tab/Cap - Peds 20 milliGRAM(s) Enteral Tube two times a day  ferrous    sulfate Liquid 300 milliGRAM(s) Oral daily  folic acid 1 milliGRAM(s) Oral daily  heparin   Injectable 5000 Unit(s) SubCutaneous every 12 hours  insulin lispro (ADMELOG) corrective regimen sliding scale   SubCutaneous three times a day before meals  ipratropium 17 MICROgram(s) HFA Inhaler 1 Puff(s) Inhalation every 6 hours  levETIRAcetam 500 milliGRAM(s) Oral two times a day  midodrine. 5 milliGRAM(s) Oral three times a day  multivitamin 1 Tablet(s) Oral daily  tamsulosin 0.4 milliGRAM(s) Oral at bedtime      LABS: All Labs Reviewed:                        7.4    11.79 )-----------( 386      ( 08 Oct 2022 08:05 )             23.0     10-08    144  |  110  |  34<H>  ----------------------------<  177<H>  4.1   |  24  |  0.8    Ca    8.8      08 Oct 2022 08:05  Phos  2.5     10-07  Mg     1.7     10-08    TPro  5.5<L>  /  Alb  2.4<L>  /  TBili  <0.2  /  DBili  x   /  AST  17  /  ALT  11  /  AlkPhos  72  10-08          Blood Culture: 10-06 @ 11:34  Organism --  Gram Stain Blood -- Gram Stain --  Specimen Source .Blood None  Culture-Blood --    10-04 @ 04:32  Organism --  Gram Stain Blood -- Gram Stain --  Specimen Source .Blood Blood-Peripheral  Culture-Blood --        Radiology: reviewed

## 2022-10-08 NOTE — PROGRESS NOTE ADULT - ASSESSMENT
62YOM with PMH of ICH sp trach/PEG, HTN/HLD, CAD, hx of c.diff who was brought in by EMS from [facility] for evaluation of elevated Cr to 4.2 (baseline 0.9) and BUN to 120 (18 in 01/2022). Pt was in acute urinary retention and is now sp teixeira catheter with >1.3L urine output    #MICHELLE, likely post-obstructive  #Urinary retention sp teixeira catheter  #Suspected UTI  #SIRS (Low grade temp, tachycardia, leukocytosis noted on 10/6); Not POA  UA 58 WBC, Large LE  - Cont monitoring BMP, and replete electrolytes for post-obstructive diuresis  - f/u UCX,BCx  - D/cony ceftriaxone per ID on 10/6 to watch off ABx. Procalc 0.33. f/u ID.   Cr 4.2 (o/a) > 3.9 > 3.2 >1.9 > 1.1 . Changed to D5W@75 now (For hypernatremia)    #Hypernatremia:  149 (10/7) > 144.    c/w D5W@75. increased PEG Free water flushes.  may d/c IVFs 10/9    #2.4 cm nodular soft tissue density along the right posterior bladder wall (CT A/P Dec 2021):  Per Urology repeat CT A/P with IV contrast. (since MICHELLE resolved now)   Consider repeat PSA levels   Cont. Flomax   Outpatient Urodynamic testing vs inpatient TOV?? - will confirm with Urology.       #Why RLE precautions bracelet? :   LE duplex neg. Check with patient's NH.     #Hypokalemia: Replete. monitor.     #Full thickness decubital ulcer, present on admission  -nonpurulent, no discharge  -afebrile  -no elevation in WBC  -burn/wound team eval    #hx of hypotension  -BP on exam 130/70  -patient takes midodrine 5 q8hrs at home with holding parameters  -monitor BP    #T2DM  -continue lispro before meals and at bedtime  -monitor fingersticks     #HLD  -continue atorvastatin 80 daily     #Posterior fossa hemorrhage in 2021 complicated by interventricular hemorrhage and hydrocephalus  Pt sp trach/peg  - Follow Pulm for vent management    DVT PPX, heparin    #Progress Note Handoff  Pending (specify): Monitor renal function and electrolytes, UCx  Eventual return to [facility] (long term resident?) . likely Monday if clinically improved. if no more temp or WBC .

## 2022-10-08 NOTE — PROGRESS NOTE ADULT - SUBJECTIVE AND OBJECTIVE BOX
Patient is a 62y old  Male who presents with a chief complaint of MICHELLE (elevated BUN and Cr) (07 Oct 2022 17:22)      Over Night Events:  Patient seen and examined.   no fever     ROS:  See HPI    PHYSICAL EXAM    ICU Vital Signs Last 24 Hrs  T(C): 37.1 (08 Oct 2022 05:34), Max: 37.9 (07 Oct 2022 14:09)  T(F): 98.8 (08 Oct 2022 05:34), Max: 100.3 (07 Oct 2022 14:09)  HR: 99 (08 Oct 2022 05:34) (81 - 99)  BP: 127/67 (08 Oct 2022 05:34) (109/68 - 129/70)  BP(mean): 82 (07 Oct 2022 20:00) (82 - 82)  ABP: --  ABP(mean): --  RR: 18 (08 Oct 2022 05:34) (18 - 20)  SpO2: 99% (08 Oct 2022 02:10) (99% - 100%)    O2 Parameters below as of 07 Oct 2022 20:00  Patient On (Oxygen Delivery Method): ventilator            General: ill looking   HEENT:         trach       Lymph Nodes: NO cervical LN   Lungs: Bilateral BS  Cardiovascular: Regular   Abdomen: Soft, Positive BS  Extremities: No clubbing   Skin: warm   Neurological:   Musculoskeletal: move all ext     I&O's Detail    07 Oct 2022 07:01  -  08 Oct 2022 07:00  --------------------------------------------------------  IN:    Enteral Tube Flush: 2700 mL    Glucerna: 720 mL  Total IN: 3420 mL    OUT:    Ureteral Catheter (mL): 1250 mL  Total OUT: 1250 mL    Total NET: 2170 mL          LABS:                          7.8    8.74  )-----------( 338      ( 07 Oct 2022 09:40 )             24.6         07 Oct 2022 09:40    149    |  117    |  53     ----------------------------<  140    4.5     |  23     |  1.1      Ca    8.9        07 Oct 2022 09:40  Phos  2.5       07 Oct 2022 09:40  Mg     1.7       07 Oct 2022 09:40    TPro  5.9    /  Alb  2.6    /  TBili  0.2    /  DBili  x      /  AST  26     /  ALT  10     /  AlkPhos  74     07 Oct 2022 09:40  Amylase x     lipase x                                                                                                                                                    Culture - Blood (collected 06 Oct 2022 11:34)  Source: .Blood None  Preliminary Report (08 Oct 2022 01:01):    No growth to date.                                                   Mode: AC/ CMV (Assist Control/ Continuous Mandatory Ventilation)  RR (machine): 15  TV (machine): 400  FiO2: 40  PEEP: 5  ITime: 0.1  MAP: 8  PIP: 19                                          MEDICATIONS  (STANDING):  acetaminophen     Tablet .. 650 milliGRAM(s) Oral once  aspirin  chewable 81 milliGRAM(s) Oral daily  atorvastatin 80 milliGRAM(s) Oral at bedtime  dextrose 5%. 1000 milliLiter(s) (75 mL/Hr) IV Continuous <Continuous>  ergocalciferol Drops 8000 Unit(s) Oral daily  famotidine  Oral Tab/Cap - Peds 20 milliGRAM(s) Enteral Tube two times a day  ferrous    sulfate Liquid 300 milliGRAM(s) Oral daily  folic acid 1 milliGRAM(s) Oral daily  heparin   Injectable 5000 Unit(s) SubCutaneous every 12 hours  insulin lispro (ADMELOG) corrective regimen sliding scale   SubCutaneous three times a day before meals  ipratropium 17 MICROgram(s) HFA Inhaler 1 Puff(s) Inhalation every 6 hours  levETIRAcetam 500 milliGRAM(s) Oral two times a day  midodrine. 5 milliGRAM(s) Oral three times a day  multivitamin 1 Tablet(s) Oral daily  tamsulosin 0.4 milliGRAM(s) Oral at bedtime    MEDICATIONS  (PRN):  acetaminophen     Tablet .. 650 milliGRAM(s) Oral every 6 hours PRN Temp greater or equal to 38C (100.4F), Mild Pain (1 - 3)  ALBUTerol    0.083%. 2.5 milliGRAM(s) Nebulizer once PRN Wheezing          Xrays:  TLC:  OG:  ET tube:                                                                                       ECHO:  CAM ICU:

## 2022-10-09 NOTE — PROGRESS NOTE ADULT - NS ATTEND AMEND GEN_ALL_CORE FT
Patient seen  Agree with above.  Awaiting scrotal sonogram report  I discussed the bladder findings with the patient's wife on the phone.  They are aware of this from prior tests.  She declined cystoscopy possible TURBT at this time until he is well enough to undergo surgical procedures.  Please reconsult  when patient is medically stable for procedures.

## 2022-10-09 NOTE — PROGRESS NOTE ADULT - ASSESSMENT
62YOM with PMH of ICH sp trach/PEG, HTN/HLD, CAD, hx of c.diff who was brought in by EMS from [facility] for evaluation of elevated Cr to 4.2 (baseline 0.9) and BUN to 120 (18 in 2022). Pt was in acute urinary retention and is now sp teixeira catheter with >1.3L urine output    #MICHELLE, likely post-obstructive: RESOLVED  #Urinary retention sp teixeira catheter  - Cont monitoring BMP, and replete electrolytes for post-obstructive diuresis  Cr 4.2 (o/a) > 3.9 > 3.2 >1.9 > 1.1 . Changed to D5W@75 now (For hypernatremia)      #SIRS (Low grade temp, tachycardia, leukocytosis noted on 10/6); Not POA  Differentials: Suspected UTI (UA 58 WBC, Large LE, Urine & Blood Cxs Neg) vs Left base PNA   CT findin. Partially imaged left pleural effusion with left lower lobe   collapse/atelectasis.  3. Areas of bowel wall thickening throughout the colon, most pronounced   at the cecum and rectum. Findings may reflect proctocolitis in   appropriate clinical setting. Trace abdominopelvic ascites.    - D/cony ceftriaxone per ID on 10/6 to watch off ABx. Procalc 0.33.  10/9: 100.8, WBC high. Started CEfepime 1gm Q12 per ID's last note.   f/u ID on duration & whether to Consider Left pleural tap??     #Hypernatremia:  149 (10/7) > 144.    c/w D5W@75. increased PEG Free water flushes.  may d/c IVFs 10/10. Na stable now     #2.4 cm nodular soft tissue density along the right posterior bladder wall (CT A/P Dec 2021):  Per Urology repeat CT A/P with IV contrast. (since MICHELLE resolved now) =  1. Under distended urinary bladder limits evaluation. Circumferential   bladder wall thickening with irregular posterior bladder wall masslike   thickening.    F/U UROLOGY FOR FURTHER PLAN OF CARE. IF CYSTOSCOPY CONSIDERED, FAMILY IS AGREEABLE.       Pending repeat PSA levels   Cont. Flomax   Outpatient Urodynamic testing vs inpatient TOV?? - will confirm with Urology.       #Why RLE precautions bracelet? :   LE duplex neg. Check with patient's NH.     #Hypokalemia: Replete. monitor.     #Full thickness decubital ulcer, present on admission  -nonpurulent, no discharge  -afebrile  -no elevation in WBC  -burn/wound team eval    #hx of hypotension  -BP on exam 130/70  -patient takes midodrine 5 q8hrs at home with holding parameters  -monitor BP    #T2DM  -continue lispro before meals and at bedtime  -monitor fingersticks     #HLD  -continue atorvastatin 80 daily     #Posterior fossa hemorrhage in  complicated by interventricular hemorrhage and hydrocephalus  Pt sp trach/peg  - Follow Pulm for vent management    DVT PPX, heparin    #Progress Note Handoff  Pending (specify): Monitor renal function and electrolytes, UCx  Eventual return to [facility] (long term resident?) .   f/u ID, Urology

## 2022-10-09 NOTE — PROGRESS NOTE ADULT - ASSESSMENT
62 y .o M sent from NH for evaluations of abnormal labs: elevated Cr to 4.2 (baseline 0.9) and BUN to 120 (18 in 01/2022).  Found to be in Urinary retention. large UO on Barney catheter placement in ED. MICHELLE improving after Barney catheter placement.   Urology called to evaluate Pt. for CT A/P finding from (2021) Symmetric renal enhancement without hydronephrosis. Bilateral renal cysts and hypodensities too small to characterize. A 1.7 cm exophytic left upper pole hypoattenuating lesion is indeterminate.  A 2.4 cm nodular soft tissue density along the right posterior bladder wall appears separate from the prostate gland and not seen on pelvic CT from 2017. Enlarged prostate protruding into the base of bladder.    Repeat CT A/P findings 10/9/22 : Under distended urinary bladder limits evaluation. Circumferential bladder wall thickening with irregular posterior bladder wall masslike thickening.    On PE right testicle enlarged  edema r/o epididymitis      Plan:   Monitor BUN/Cr   Please obtain Testicular US   Cont. Abx as per ID   F/U PSA level   Cont. Flomax   UDS as an outpatient  Urology will F/U  Case d/w  attending

## 2022-10-09 NOTE — PROGRESS NOTE ADULT - SUBJECTIVE AND OBJECTIVE BOX
Called back and spoke with pt's father. He states pt missed her dose of Humira on Friday and they were wondering when she should take her next dose? Advised to go ahead and take her dose now and restart the schedule to every other Tuesday. He states she really likes taking it on Friday, can't she juts wait till Friday to take the med. Advised no, that is really not a good idea, the linger she waits to take the medication the more likely it is that she will flare. He verb understanding.    S:

## 2022-10-09 NOTE — PROGRESS NOTE ADULT - ASSESSMENT
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 diff colitis  HO Urinary retention  HO B/L Distal DVT  HO L pneumothorax  HO Aspiration pneumonia  Non verbal and does not track or follow commands at baseline.    PLAN:    CNS: Hold CNS depressants. Pain control as needed    HEENT: Oral care    PULMONARY:  HOB @ 45 degrees. Vent changes: continue with Volume A/C for now. Target SpO2 92 to 96%, titrate oxygen as tolerated.    CARDIOVASCULAR: Avoid volume overload. Continue home midodrine. IVF change to D 5W    GI: GI prophylaxis. PEG feeding as tolerated. Bowel regimen     RENAL: Follow up lytes. Correct as needed. Barney placement. Trend Cr.   follow Na level   follow ct abd result   follow Urology   INFECTIOUS DISEASE: ABX per ID    HEMATOLOGICAL: DVT prophylaxis. LE doppler -    ENDOCRINE:  Follow up FS. Target -180.     MUSCULOSKELETAL: Wound care nurse eval.     Vent unit

## 2022-10-09 NOTE — PROGRESS NOTE ADULT - SUBJECTIVE AND OBJECTIVE BOX
Patient is a 62y old  Male who presents with a chief complaint of MICHELLE (elevated BUN and Cr) (08 Oct 2022 17:57)      Over Night Events:  Patient seen and examined.   on vent   s/p ct abd /pelvis     ROS:  See HPI    PHYSICAL EXAM    ICU Vital Signs Last 24 Hrs  T(C): 38.2 (09 Oct 2022 05:00), Max: 38.2 (09 Oct 2022 05:00)  T(F): 100.8 (09 Oct 2022 05:00), Max: 100.8 (09 Oct 2022 05:00)  HR: 101 (09 Oct 2022 05:00) (84 - 101)  BP: 99/63 (09 Oct 2022 05:00) (99/63 - 129/74)  BP(mean): --  ABP: --  ABP(mean): --  RR: 18 (09 Oct 2022 05:00) (18 - 19)  SpO2: 100% (09 Oct 2022 05:00) (99% - 100%)    O2 Parameters below as of 08 Oct 2022 20:23  Patient On (Oxygen Delivery Method): ventilator            General: ill looking   HEENT:      trach          Lymph Nodes: NO cervical LN   Lungs:  decrease Left lower   Cardiovascular: Regular   Abdomen: Soft, Positive BS  Extremities: No clubbing   Skin: warm   Neurological:   Musculoskeletal: move all ext     I&O's Detail    08 Oct 2022 07:01  -  09 Oct 2022 07:00  --------------------------------------------------------  IN:    dextrose 5%: 900 mL    Enteral Tube Flush: 200 mL    Glucerna: 480 mL  Total IN: 1580 mL    OUT:    Ureteral Catheter (mL): 800 mL  Total OUT: 800 mL    Total NET: 780 mL          LABS:                          7.4    11.79 )-----------( 386      ( 08 Oct 2022 08:05 )             23.0         08 Oct 2022 08:05    144    |  110    |  34     ----------------------------<  177    4.1     |  24     |  0.8      Ca    8.8        08 Oct 2022 08:05  Phos  2.5       07 Oct 2022 09:40  Mg     1.7       08 Oct 2022 08:05                                                                                                                                                  Culture - Blood (collected 06 Oct 2022 11:34)  Source: .Blood None  Preliminary Report (08 Oct 2022 01:01):    No growth to date.                                                   Mode: AC/ CMV (Assist Control/ Continuous Mandatory Ventilation)  RR (machine): 15  TV (machine): 400  FiO2: 40  PEEP: 5  ITime: 1  MAP: 11  PIP: 26                                          MEDICATIONS  (STANDING):  acetaminophen     Tablet .. 650 milliGRAM(s) Oral once  aspirin  chewable 81 milliGRAM(s) Oral daily  atorvastatin 80 milliGRAM(s) Oral at bedtime  chlorhexidine 0.12% Liquid 15 milliLiter(s) Oral Mucosa every 12 hours  dextrose 5%. 1000 milliLiter(s) (75 mL/Hr) IV Continuous <Continuous>  ergocalciferol Drops 8000 Unit(s) Oral daily  famotidine  Oral Tab/Cap - Peds 20 milliGRAM(s) Enteral Tube two times a day  ferrous    sulfate Liquid 300 milliGRAM(s) Oral daily  folic acid 1 milliGRAM(s) Oral daily  heparin   Injectable 5000 Unit(s) SubCutaneous every 12 hours  insulin lispro (ADMELOG) corrective regimen sliding scale   SubCutaneous three times a day before meals  ipratropium 17 MICROgram(s) HFA Inhaler 1 Puff(s) Inhalation every 6 hours  levETIRAcetam 500 milliGRAM(s) Oral two times a day  midodrine. 5 milliGRAM(s) Oral three times a day  multivitamin 1 Tablet(s) Oral daily  tamsulosin 0.4 milliGRAM(s) Oral at bedtime    MEDICATIONS  (PRN):  acetaminophen     Tablet .. 650 milliGRAM(s) Oral every 6 hours PRN Temp greater or equal to 38C (100.4F), Mild Pain (1 - 3)  ALBUTerol    0.083%. 2.5 milliGRAM(s) Nebulizer once PRN Wheezing          Xrays:  TLC:  OG:  ET tube:                                                                                       ECHO:  [facility] ICU:

## 2022-10-09 NOTE — PROGRESS NOTE ADULT - SUBJECTIVE AND OBJECTIVE BOX
Urology progress note:   Pt. seen and examined, mechanically ventilated, Teixeira catheter in place draining yellow urine. T max 100.8 noted.      ROS:   [ x  ] Due to altered mental status/intubation, subjective information was not able to be obtained from the patient. History was obtained to the extent possible from review of the chart and collateral sources of information.     acetaminophen     Tablet .. 650 milliGRAM(s) Oral every 6 hours PRN  acetaminophen     Tablet .. 650 milliGRAM(s) Oral once  ALBUTerol    0.083%. 2.5 milliGRAM(s) Nebulizer once PRN  aspirin  chewable 81 milliGRAM(s) Oral daily  atorvastatin 80 milliGRAM(s) Oral at bedtime  cefepime   IVPB 1000 milliGRAM(s) IV Intermittent every 12 hours  chlorhexidine 0.12% Liquid 15 milliLiter(s) Oral Mucosa every 12 hours  dextrose 5%. 1000 milliLiter(s) IV Continuous <Continuous>  ergocalciferol Drops 8000 Unit(s) Oral daily  famotidine  Oral Tab/Cap - Peds 20 milliGRAM(s) Enteral Tube two times a day  ferrous    sulfate Liquid 300 milliGRAM(s) Oral daily  folic acid 1 milliGRAM(s) Oral daily  heparin   Injectable 5000 Unit(s) SubCutaneous every 12 hours  insulin lispro (ADMELOG) corrective regimen sliding scale   SubCutaneous three times a day before meals  ipratropium 17 MICROgram(s) HFA Inhaler 1 Puff(s) Inhalation every 6 hours  levETIRAcetam 500 milliGRAM(s) Oral two times a day  midodrine. 5 milliGRAM(s) Oral three times a day  multivitamin 1 Tablet(s) Oral daily  tamsulosin 0.4 milliGRAM(s) Oral at bedtime    penicillin (Other)        Vital Signs Last 24 Hrs  T(C): 37.6 (09 Oct 2022 20:21), Max: 38.2 (09 Oct 2022 05:00)  T(F): 99.7 (09 Oct 2022 20:21), Max: 100.8 (09 Oct 2022 05:00)  HR: 98 (09 Oct 2022 20:21) (76 - 101)  BP: 85/54 (09 Oct 2022 20:21) (85/54 - 118/73)  RR: 18 (09 Oct 2022 20:21) (18 - 18)  SpO2: 98% (09 Oct 2022 20:21) (98% - 100%)    Parameters below as of 09 Oct 2022 20:21  Patient On (Oxygen Delivery Method): ventilator      PE  GEN: NAD, Awake, not following verbal commands   HEENT: NC/AT  RESP: Mechanically ventilated  CARDIO: +S1/S2  ABDO: Soft, NT/ND, no palpable bladder   BACK: No CVAT B/L  : Non circumcised male. Scrotum + scrotal edema, no erythema , enlarged  Right testicle . No meatal discharge. + Indwelling teixeira in place, draining yellow urine.        I&O's Detail    08 Oct 2022 07:01  -  09 Oct 2022 07:00  --------------------------------------------------------  IN:    dextrose 5%: 900 mL    Enteral Tube Flush: 200 mL    Glucerna: 480 mL  Total IN: 1580 mL    OUT:    Ureteral Catheter (mL): 800 mL  Total OUT: 800 mL    Total NET: 780 mL      09 Oct 2022 07:01  -  09 Oct 2022 20:31  --------------------------------------------------------  IN:    dextrose 5%: 900 mL    Glucerna: 480 mL  Total IN: 1380 mL    OUT:    Ureteral Catheter (mL): 800 mL  Total OUT: 800 mL    Total NET: 580 mL         LABS:                        7.6    13.12 )-----------( 344      ( 09 Oct 2022 17:12 )             24.8     10-09    141  |  106  |  21<H>  ----------------------------<  133<H>  3.8   |  24  |  0.8    Ca    8.3<L>      09 Oct 2022 17:12  Mg     1.8     10-09    TPro  5.3<L>  /  Alb  2.3<L>  /  TBili  0.2  /  DBili  x   /  AST  10  /  ALT  9   /  AlkPhos  69  10-09        .Blood None  10-06 @ 11:34   No growth to date.  --  --      .Blood Blood-Peripheral  10-04 @ 04:32   No Growth Final  --  --    Radiology:  < from: CT Abdomen and Pelvis w/ IV Cont (10.08.22 @ 22:09) >    ACC: [id number] EXAM:  CT ABDOMEN AND PELVIS IC                          PROCEDURE DATE:  10/08/2022          INTERPRETATION:  CLINICAL STATEMENT: Bladder mass    TECHNIQUE: Contiguous axial CT images were obtained from the lower chest   to the pubicsymphysis following administration of 100cc Optiray 320   intravenous contrast.  Oral contrast was not administered.  Reformatted   images in the coronal and sagittal planes were acquired.    COMPARISON CT: December 16, 2021    OTHER STUDIES USED FORCORRELATION: None.      FINDINGS:    LOWER CHEST: Partially imaged left effusion with left lower lobe   collapse. Coronary atherosclerosis.    HEPATOBILIARY: No suspicious parenchymal lesion or biliary ductal   dilatation.    SPLEEN: Unremarkable.    PANCREAS: Unremarkable.    ADRENAL GLANDS: Nodular thickening left adrenal gland, unchanged.    KIDNEYS: Symmetric renal enhancement without hydronephrosis bilaterally.   Bilateral renal cysts and additional subcentimeter hypodensities too   small tofurther characterize.    ABDOMINOPELVIC NODES: Unremarkable.    PELVIC ORGANS: Teixeira catheter in urinary bladder. Circumferential bladder   wall thickening with irregular posterior bladder wall masslike   thickening. Under distention limits evaluation.    PERITONEUM/MESENTERY/BOWEL: Gastrostomy catheter in place. Trace   abdominopelvic ascites. No small bowel obstruction or pneumoperitoneum.   Rectal fecal impaction with mild rectal wall thickening and surrounding   inflammatory changes. Areas ofbowel wall thickening throughout the   colon, most pronounced at the cecum..    BONES/SOFT TISSUES: Chronic degenerative changes of the spine and   bilateral hips..    OTHER: Right-sided  shunt      IMPRESSION:    1. Under distended urinary bladderlimits evaluation. Circumferential   bladder wall thickening with irregular posterior bladder wall masslike   thickening.    2. Partially imaged left pleural effusion with left lower lobe   collapse/atelectasis.    3. Areas of bowel wall thickening throughout the colon, most pronounced   at the cecum and rectum. Findings may reflect proctocolitis in   appropriate clinical setting. Trace abdominopelvic ascites.    --- End of Report ---    ELLIOT LANDAU MD; Attending Radiologist  This document has been electronically signed. Oct  9 2022  9:32AM    < end of copied text >

## 2022-10-10 NOTE — PROGRESS NOTE ADULT - SUBJECTIVE AND OBJECTIVE BOX
ALICIA BARBOUR 62y Male  MRN#: [id number]   CODE STATUS:________    Hospital Day: 6d    Pt is currently admitted with the primary diagnosis of Urinary retention(MICHELLE)    SUBJECTIVE  Hospital Course  62YOM with PMH of ICH sp trach/PEG, HTN/HLD, CAD, hx of c.diff who was brought in by EMS from [facility] for evaluation of elevated Cr to 4.2 (baseline 0.9) and BUN to 120 (18 in 01/2022). Pt was in acute urinary retention and is now sp teixeira catheter with >1.3L urine output. Cr back to baseline.     10/6 Low grade temp, Tachy, leukocytosis - but workup negative so d/cony Ab  10/8 - low grade fever restarted on cefepime  10/10 - sent trach cultures and blood Cx pending, continue with cefepime as per ID    Overnight events   None significant    Subjective complaints   No new complaints  Present Today:   - Teixeira:  No [  ], Yes [   ] : Indication:     - Type of IV Access:       .. CVC/Piccline:  No [  ], Yes [   ] : Indication:       .. Midline: No [  ], Yes [   ] : Indication:                                             ----------------------------------------------------------  OBJECTIVE  PAST MEDICAL & SURGICAL HISTORY  HTN (hypertension)    Diabetes mellitus                                              -----------------------------------------------------------  ALLERGIES:  penicillin (Other)                                            ------------------------------------------------------------    HOME MEDICATIONS  Home Medications:  albuterol 2.5 mg/3 mL (0.083%) inhalation solution: 1 unit(s) inhaled every 4 hours, As Needed (04 Oct 2022 11:00)  aspirin 81 mg oral tablet, chewable: 1 tab(s) orally once a day (04 Oct 2022 11:00)  atorvastatin 80 mg oral tablet: 1 tab(s) orally once a day (04 Oct 2022 11:00)  Atrovent 18 mcg/inh inhalation aerosol: 1 unit(s) inhaled every 6 hours (04 Oct 2022 11:00)  ergocalciferol 200 mcg/mL (8000 intl units/mL) oral solution: 0.25 milliliter(s) orally once a day (04 Oct 2022 11:00)  famotidine 10 mg oral tablet: 1 tab(s) orally 2 times a day (04 Oct 2022 11:00)  ferrous sulfate 220 mg/5 mL (44 mg/5 mL elemental iron) oral elixir: 7.5 milliliter(s) by gastrostomy tube once a day (04 Oct 2022 11:00)  folic acid 1 mg oral tablet: 1 tab(s) orally once a day (04 Oct 2022 11:00)  heparin 5000 units/mL injectable solution: 5000 unit(s) injectable 2 times a day (04 Oct 2022 11:00)  Keppra 100 mg/mL oral solution: 5 milliliter(s) by gastrostomy tube 2 times a day (04 Oct 2022 11:00)  midodrine 5 mg oral tablet: 1 tab(s) orally 3 times a day (04 Oct 2022 11:00)  Multiple Vitamins oral tablet: 1 tab(s) orally once a day via PEG (04 Oct 2022 11:00)  Tylenol 325 mg oral tablet: 2 tab(s) orally once a day (04 Oct 2022 11:00)                           MEDICATIONS:  STANDING MEDICATIONS  acetaminophen     Tablet .. 650 milliGRAM(s) Oral once  aspirin  chewable 81 milliGRAM(s) Oral daily  atorvastatin 80 milliGRAM(s) Oral at bedtime  cefepime   IVPB 1000 milliGRAM(s) IV Intermittent every 12 hours  chlorhexidine 0.12% Liquid 15 milliLiter(s) Oral Mucosa every 12 hours  ergocalciferol Drops 8000 Unit(s) Oral daily  famotidine  Oral Tab/Cap - Peds 20 milliGRAM(s) Enteral Tube two times a day  ferrous    sulfate Liquid 300 milliGRAM(s) Oral daily  folic acid 1 milliGRAM(s) Oral daily  heparin   Injectable 5000 Unit(s) SubCutaneous every 12 hours  insulin lispro (ADMELOG) corrective regimen sliding scale   SubCutaneous three times a day before meals  ipratropium 17 MICROgram(s) HFA Inhaler 1 Puff(s) Inhalation every 6 hours  levETIRAcetam 500 milliGRAM(s) Oral two times a day  midodrine. 5 milliGRAM(s) Oral three times a day  multivitamin 1 Tablet(s) Oral daily  tamsulosin 0.4 milliGRAM(s) Oral at bedtime    PRN MEDICATIONS  acetaminophen     Tablet .. 650 milliGRAM(s) Oral every 6 hours PRN  ALBUTerol    0.083%. 2.5 milliGRAM(s) Nebulizer once PRN                                            ------------------------------------------------------------  VITAL SIGNS: Last 24 Hours  T(C): 36.7 (10 Oct 2022 12:37), Max: 37.6 (09 Oct 2022 20:21)  T(F): 98 (10 Oct 2022 12:37), Max: 99.7 (09 Oct 2022 20:21)  HR: 78 (10 Oct 2022 12:37) (76 - 98)  BP: 94/55 (10 Oct 2022 12:37) (82/50 - 118/73)  BP(mean): --  RR: 16 (10 Oct 2022 12:37) (16 - 18)  SpO2: 100% (10 Oct 2022 12:37) (98% - 100%)      10-09-22 @ 07:01  -  10-10-22 @ 07:00  --------------------------------------------------------  IN: 1380 mL / OUT: 1400 mL / NET: -20 mL                                             --------------------------------------------------------------  LABS:                        8.0    12.88 )-----------( 383      ( 10 Oct 2022 12:04 )             24.6     10-09    141  |  106  |  21<H>  ----------------------------<  133<H>  3.8   |  24  |  0.8    Ca    8.3<L>      09 Oct 2022 17:12  Mg     1.8     10-09    TPro  5.3<L>  /  Alb  2.3<L>  /  TBili  0.2  /  DBili  x   /  AST  10  /  ALT  9   /  AlkPhos  69  10-09                                                              -------------------------------------------------------------  RADIOLOGY:                                            --------------------------------------------------------------    PHYSICAL EXAM:  Constitutional: NAD, trach/PEG  HEENT: PERR, EOMI, Normal Hearing, MMM  Neck: Soft and supple, No LAD, No JVD  Respiratory: Breath sounds are clear bilaterally, No wheezing, rales or rhonchi  Cardiovascular: S1 and S2, regular rate and rhythm, no Murmurs, gallops or rubs  Gastrointestinal: Bowel Sounds present, soft, nontender, nondistended, no guarding, no rebound  Extremities: No peripheral edema

## 2022-10-10 NOTE — PROGRESS NOTE ADULT - ASSESSMENT
62YOM with PMH of ICH sp trach/PEG, HTN/HLD, CAD, hx of c.diff who was brought in by EMS from [facility] for evaluation of elevated Cr to 4.2 (baseline 0.9) and BUN to 120 (18 in 2022). Pt was in acute urinary retention and is now sp teixeira catheter with >1.3L urine output    #MICHELLE, likely post-obstructive: RESOLVED  #Urinary retention sp teixeira catheter  - Cont monitoring BMP, and replete electrolytes for post-obstructive diuresis  - Cr 4.2 (o/a) > 3.9 > 3.2 >1.9 > 1.1 .   - d/cony d5, Follow up bmp      #SIRS (Low grade temp, tachycardia, leukocytosis noted on 10/6); Not POA  Differentials: Suspected UTI (UA 58 WBC, Large LE, Urine & Blood Cxs Neg) vs Left base PNA   CT findin. Partially imaged left pleural effusion with left lower lobe   collapse/atelectasis.  3. Areas of bowel wall thickening throughout the colon, most pronounced   at the cecum and rectum. Findings may reflect proctocolitis in   appropriate clinical setting. Trace abdominopelvic ascites.    - D/cony ceftriaxone per ID on 10/6 to watch off ABx. Procalc 0.33.  - 10/9: 100.8, WBC high. Started CEfepime 1gm Q12 per ID's last note.   - CXR 10/10 stable, no new infiltrates  - As per ID - continue with cefepime, Follow up tacheal and blood Cx    #Hypernatremia:  149 (10/7) > 144.    c/w D5W@75. increased PEG Free water flushes.  d/cony D5    #2.4 cm nodular soft tissue density along the right posterior bladder wall (CT A/P Dec 2021):  Per Urology repeat CT A/P with IV contrast. (since MICHELLE resolved now) =  1. Under distended urinary bladder limits evaluation. Circumferential   bladder wall thickening with irregular posterior bladder wall masslike   thickening.    - Repeat PSA 2.20(2.24 in )  As per urology  - spoke to patients wife and made aware of CT findings, family does not want cystoscopy now but okay with TURB when patient stable    Pending repeat PSA levels   Cont. Flomax   Outpatient Urodynamic testing vs inpatient TOV?? - will confirm with Urology.       #Why RLE precautions bracelet? :   LE duplex neg. Check with patient's NH.     #Hypokalemia: Replete. monitor.     #Full thickness decubital ulcer, present on admission  -nonpurulent, no discharge  -afebrile  -no elevation in WBC  -burn/wound team eval    #hx of hypotension  -BP on exam 130/70  -patient takes midodrine 5 q8hrs at home with holding parameters  -monitor BP    #T2DM  -continue lispro before meals and at bedtime  -monitor fingersticks     #HLD  -continue atorvastatin 80 daily     #Posterior fossa hemorrhage in  complicated by interventricular hemorrhage and hydrocephalus  Pt sp trach/peg  - Follow Pulm for vent management    DVT PPX, heparin    #Progress Note Handoff  Pending (specify): Follow up tracheal and blood Cx, Lytes 62YOM with PMH of ICH sp trach/PEG, HTN/HLD, CAD, hx of c.diff who was brought in by EMS from [facility] for evaluation of elevated Cr to 4.2 (baseline 0.9) and BUN to 120 (18 in 2022). Pt was in acute urinary retention and is now sp teixeira catheter with >1.3L urine output    #MICHELLE, likely post-obstructive: RESOLVED  #Urinary retention sp teixeira catheter  - Cont monitoring BMP, and replete electrolytes for post-obstructive diuresis  - Cr 4.2 (o/a) > 3.9 > 3.2 >1.9 > 1.1 .   - d/cony d5, Follow up bmp      #SIRS (Low grade temp, tachycardia, leukocytosis noted on 10/6); Not POA  Differentials: Suspected UTI (UA 58 WBC, Large LE, Urine & Blood Cxs Neg) vs Left base PNA   CT findin. Partially imaged left pleural effusion with left lower lobe   collapse/atelectasis.  3. Areas of bowel wall thickening throughout the colon, most pronounced   at the cecum and rectum. Findings may reflect proctocolitis in   appropriate clinical setting. Trace abdominopelvic ascites.    - D/cony ceftriaxone per ID on 10/6 to watch off ABx. Procalc 0.33.  - 10/9: 100.8, WBC high. Started CEfepime 1gm Q12 per ID's last note.   - CXR 10/10 stable, no new infiltrates  - As per ID - continue with cefepime, Follow up tacheal and blood Cx    #Hypernatremia:  149 (10/7) > 144.    c/w D5W@75. increased PEG Free water flushes.  d/cony D5    #2.4 cm nodular soft tissue density along the right posterior bladder wall (CT A/P Dec 2021):  Per Urology repeat CT A/P with IV contrast. (since MICHELLE resolved now) =  1. Under distended urinary bladder limits evaluation. Circumferential   bladder wall thickening with irregular posterior bladder wall masslike   thickening.    - Repeat PSA 2.20(2.24 in )  As per urology  - spoke to patients wife and made aware of CT findings, family does not want cystoscopy now but okay with TURB when patient stable    Pending repeat PSA levels   Cont. Flomax   Outpatient Urodynamic testing vs inpatient TOV?? - will confirm with Urology.       #Why RLE precautions bracelet? :   LE duplex neg. Check with patient's NH.     #Hypokalemia: Replete. monitor.     #Full thickness decubital ulcer, present on admission  -nonpurulent, no discharge  -afebrile  -no elevation in WBC  -burn/wound team eval    #hx of hypotension  -BP on exam 130/70  -patient takes midodrine 5 q8hrs at home with holding parameters  -monitor BP    #T2DM  -continue lispro before meals and at bedtime  -monitor fingersticks     #HLD  -continue atorvastatin 80 daily     #Posterior fossa hemorrhage in  complicated by interventricular hemorrhage and hydrocephalus  Pt sp trach/peg  - Follow Pulm for vent management    DVT PPX, heparin    #Progress Note Handoff  Pending (specify): Follow up tracheal and blood Cx, Lytes 62YOM with PMH of ICH sp trach/PEG, HTN/HLD, CAD, hx of c.diff who was brought in by EMS from [facility] for evaluation of elevated Cr to 4.2 (baseline 0.9) and BUN to 120 (18 in 2022). Pt was in acute urinary retention and is now sp teixeira catheter with >1.3L urine output    #MICHELLE, likely post-obstructive: RESOLVED  #Urinary retention sp teixeira catheter  - Cont monitoring BMP, and replete electrolytes for post-obstructive diuresis  - Cr 4.2 (o/a) > 3.9 > 3.2 >1.9 > 1.1 .   - d/cony d5, Follow up bmp      #SIRS (Low grade temp, tachycardia, leukocytosis noted on 10/6); Not POA  Differentials: Suspected UTI (UA 58 WBC, Large LE, Urine & Blood Cxs Neg) vs Left base PNA   CT findin. Partially imaged left pleural effusion with left lower lobe   collapse/atelectasis.  3. Areas of bowel wall thickening throughout the colon, most pronounced   at the cecum and rectum. Findings may reflect proctocolitis in   appropriate clinical setting. Trace abdominopelvic ascites.    - D/cony ceftriaxone per ID on 10/6 to watch off ABx. Procalc 0.33.  - 10/9: 100.8, WBC high. Started CEfepime 1gm Q12 per ID's last note.   - CXR 10/10 stable, no new infiltrates  - As per ID - continue with cefepime, Follow up tacheal and blood Cx    #Hypernatremia:  149 (10/7) > 144.    c/w D5W@75. increased PEG Free water flushes.  d/cony D5    #2.4 cm nodular soft tissue density along the right posterior bladder wall (CT A/P Dec 2021):  Per Urology repeat CT A/P with IV contrast. (since MICHELLE resolved now) =  1. Under distended urinary bladder limits evaluation. Circumferential   bladder wall thickening with irregular posterior bladder wall masslike   thickening.    - Repeat PSA 2.20(2.24 in )  As per urology  - spoke to patients wife and made aware of CT findings, family does not want cystoscopy now but okay with TURB when patient stable    Pending repeat PSA levels   Cont. Flomax   Outpatient Urodynamic testing vs inpatient TOV?? - will confirm with Urology.       #Why RLE precautions bracelet? :   LE duplex neg. Check with patient's NH.     #Hypokalemia: Replete. monitor.     #Full thickness decubital ulcer, present on admission  -nonpurulent, no discharge  -afebrile  -no elevation in WBC  -burn/wound team eval    #hx of hypotension  -BP on exam 130/70  -patient takes midodrine 5 q8hrs at home with holding parameters  -monitor BP    #T2DM  -continue lispro before meals and at bedtime  -monitor fingersticks     #HLD  -continue atorvastatin 80 daily     #Posterior fossa hemorrhage in  complicated by interventricular hemorrhage and hydrocephalus  Pt sp trach/peg  - Follow Pulm for vent management    DVT PPX, heparin    #Progress Note Handoff  Pending (specify): Follow up tracheal and blood Cx, Lytes, testicular US

## 2022-10-10 NOTE — PROGRESS NOTE ADULT - ASSESSMENT
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 diff colitis  HO Urinary retention  HO B/L Distal DVT  HO L pneumothorax  HO Aspiration pneumonia  Non verbal and does not track or follow commands at baseline.    PLAN:    CNS:  Pain control as needed    HEENT: Oral care.  Trach care     PULMONARY:  HOB @ 45 degrees. Vent changes: continue with Volume A/C for now. Target SpO2 92 to 96%, titrate oxygen as tolerated.    CARDIOVASCULAR: Avoid volume overload. DC Free H2O     GI: GI prophylaxis. PEG feeding as tolerated. Bowel regimen     RENAL: Follow up lytes. Correct as needed.     INFECTIOUS DISEASE: ABX per ID    HEMATOLOGICAL: DVT prophylaxis. LE doppler -    ENDOCRINE:  Follow up FS. Target -180.     MUSCULOSKELETAL: Wound care nurse eval.     Vent unit    DC Planing  IMPRESSION:    MICHELLE, likely postobstructive uropathy, resolved   Hypernatremia resolved   HO Multiple pressure ulcers  Chronic hypoxemic respiratory failure SP trach   S/p trach and PEG (complicated by trach dislodgment and replacement twice)  HO Posterior fossa hemorrhage c/b severe IVH with casting of the 4th and 3rd ventricles with hydrocephalus, s/p EVD and SOC  HO PICA aneurysm now s/p resection  HO Recurrent c. diff colitis  HO Urinary retention  HO B/L Distal DVT  HO L pneumothorax  HO Aspiration pneumonia  Non verbal and does not track or follow commands at baseline.    PLAN:    CNS:  Pain control as needed    HEENT: Oral care.  Trach care     PULMONARY:  HOB @ 45 degrees. Vent changes: continue with Volume A/C for now. Target SpO2 92 to 96%, titrate oxygen as tolerated.    CARDIOVASCULAR: Avoid volume overload. DC Free H2O     GI: GI prophylaxis. PEG feeding as tolerated. Bowel regimen     RENAL: Follow up lytes. Correct as needed. Urology follow up regarding Barney     INFECTIOUS DISEASE: ABX per ID    HEMATOLOGICAL: DVT prophylaxis. LE doppler -    ENDOCRINE:  Follow up FS. Target -180.     MUSCULOSKELETAL: Wound care nurse eval.     Vent unit    DC Planing

## 2022-10-10 NOTE — PROGRESS NOTE ADULT - SUBJECTIVE AND OBJECTIVE BOX
Patient is a 62y old  Male who presents with a chief complaint of MICHELLE (elevated BUN and Cr) (09 Oct 2022 20:31)        Over Night Events:  On MV.  Off pressors.          ROS:     All ROS are negative except HPI         PHYSICAL EXAM    ICU Vital Signs Last 24 Hrs  T(C): 36.5 (10 Oct 2022 05:00), Max: 37.6 (09 Oct 2022 20:21)  T(F): 97.7 (10 Oct 2022 05:00), Max: 99.7 (09 Oct 2022 20:21)  HR: 84 (10 Oct 2022 05:00) (76 - 98)  BP: 82/50 (10 Oct 2022 05:00) (82/50 - 118/73)  BP(mean): --  ABP: --  ABP(mean): --  RR: 18 (09 Oct 2022 20:21) (18 - 18)  SpO2: 98% (09 Oct 2022 20:21) (98% - 100%)    O2 Parameters below as of 09 Oct 2022 20:21  Patient On (Oxygen Delivery Method): ventilator            CONSTITUTIONAL:  In NAD    ENT:   Airway patent,   Mouth with normal mucosa.   Trach     EYES:   Pupils equal,   Round and reactive to light.    CARDIAC:   Normal rate,   Regular rhythm.        RESPIRATORY:   No wheezing  Bilateral BS  Normal chest expansion  Not tachypneic,  No use of accessory muscles    GASTROINTESTINAL:  Abdomen soft,   Non-tender,   No guarding,   + BS    MUSCULOSKELETAL:   Range of motion is not limited,  No clubbing, cyanosis    NEUROLOGICAL:   Does not follow commands     SKIN:   Skin normal color for race,   No evidence of rash.    PSYCHIATRIC:    No apparent risk to self or others.          10-09-22 @ 07:01  -  10-10-22 @ 07:00  --------------------------------------------------------  IN:    dextrose 5%: 900 mL    Glucerna: 480 mL  Total IN: 1380 mL    OUT:    Ureteral Catheter (mL): 1400 mL  Total OUT: 1400 mL    Total NET: -20 mL          LABS:                            7.6    13.12 )-----------( 344      ( 09 Oct 2022 17:12 )             24.8                                               10-09    141  |  106  |  21<H>  ----------------------------<  133<H>  3.8   |  24  |  0.8    Ca    8.3<L>      09 Oct 2022 17:12  Mg     1.8     10-09    TPro  5.3<L>  /  Alb  2.3<L>  /  TBili  0.2  /  DBili  x   /  AST  10  /  ALT  9   /  AlkPhos  69  10-09                                                                                           LIVER FUNCTIONS - ( 09 Oct 2022 17:12 )  Alb: 2.3 g/dL / Pro: 5.3 g/dL / ALK PHOS: 69 U/L / ALT: 9 U/L / AST: 10 U/L / GGT: x                                                                                               Mode: AC/ CMV (Assist Control/ Continuous Mandatory Ventilation)  RR (machine): 15  TV (machine): 400  FiO2: 40  PEEP: 5  ITime: 1  MAP: 8  PIP: 14                                          MEDICATIONS  (STANDING):  acetaminophen     Tablet .. 650 milliGRAM(s) Oral once  aspirin  chewable 81 milliGRAM(s) Oral daily  atorvastatin 80 milliGRAM(s) Oral at bedtime  cefepime   IVPB 1000 milliGRAM(s) IV Intermittent every 12 hours  chlorhexidine 0.12% Liquid 15 milliLiter(s) Oral Mucosa every 12 hours  dextrose 5%. 1000 milliLiter(s) (75 mL/Hr) IV Continuous <Continuous>  ergocalciferol Drops 8000 Unit(s) Oral daily  famotidine  Oral Tab/Cap - Peds 20 milliGRAM(s) Enteral Tube two times a day  ferrous    sulfate Liquid 300 milliGRAM(s) Oral daily  folic acid 1 milliGRAM(s) Oral daily  heparin   Injectable 5000 Unit(s) SubCutaneous every 12 hours  insulin lispro (ADMELOG) corrective regimen sliding scale   SubCutaneous three times a day before meals  ipratropium 17 MICROgram(s) HFA Inhaler 1 Puff(s) Inhalation every 6 hours  levETIRAcetam 500 milliGRAM(s) Oral two times a day  midodrine. 5 milliGRAM(s) Oral three times a day  multivitamin 1 Tablet(s) Oral daily  tamsulosin 0.4 milliGRAM(s) Oral at bedtime    MEDICATIONS  (PRN):  acetaminophen     Tablet .. 650 milliGRAM(s) Oral every 6 hours PRN Temp greater or equal to 38C (100.4F), Mild Pain (1 - 3)  ALBUTerol    0.083%. 2.5 milliGRAM(s) Nebulizer once PRN Wheezing      New X-rays reviewed:                                                                                  ECHO

## 2022-10-11 NOTE — PROGRESS NOTE ADULT - SUBJECTIVE AND OBJECTIVE BOX
ALICIA BARBOUR 62y Male  MRN#: [id number]   CODE STATUS:________    Hospital Day: 7d    Pt is currently admitted with the primary diagnosis of     SUBJECTIVE  Hospital Course  62YOM with PMH of ICH sp trach/PEG, HTN/HLD, CAD, hx of c.diff who was brought in by EMS from [facility] for evaluation of elevated Cr to 4.2 (baseline 0.9) and BUN to 120 (18 in 01/2022). Pt was in acute urinary retention and is now sp teixeira catheter with >1.3L urine output. Cr back to baseline.     10/6 Low grade temp, Tachy, leukocytosis - but workup negative so d/cony Ab  10/8 - low grade fever restarted on cefepime  10/10 - sent trach cultures and blood Cx pending, continue with cefepime as per ID  10/11 - pending trach and blood cultures, scrotal US done pending read, continue with cefepime      Overnight events   None significant    Subjective complaints   No new complaints  Present Today:   - Teixeira:  No [  ], Yes [   ] : Indication:     - Type of IV Access:       .. CVC/Piccline:  No [  ], Yes [   ] : Indication:       .. Midline: No [  ], Yes [   ] : Indication:                                             ----------------------------------------------------------  OBJECTIVE  PAST MEDICAL & SURGICAL HISTORY  HTN (hypertension)    Diabetes mellitus                                              -----------------------------------------------------------  ALLERGIES:  penicillin (Other)                                            ------------------------------------------------------------    HOME MEDICATIONS  Home Medications:  albuterol 2.5 mg/3 mL (0.083%) inhalation solution: 1 unit(s) inhaled every 4 hours, As Needed (04 Oct 2022 11:00)  aspirin 81 mg oral tablet, chewable: 1 tab(s) orally once a day (04 Oct 2022 11:00)  atorvastatin 80 mg oral tablet: 1 tab(s) orally once a day (04 Oct 2022 11:00)  Atrovent 18 mcg/inh inhalation aerosol: 1 unit(s) inhaled every 6 hours (04 Oct 2022 11:00)  ergocalciferol 200 mcg/mL (8000 intl units/mL) oral solution: 0.25 milliliter(s) orally once a day (04 Oct 2022 11:00)  famotidine 10 mg oral tablet: 1 tab(s) orally 2 times a day (04 Oct 2022 11:00)  ferrous sulfate 220 mg/5 mL (44 mg/5 mL elemental iron) oral elixir: 7.5 milliliter(s) by gastrostomy tube once a day (04 Oct 2022 11:00)  folic acid 1 mg oral tablet: 1 tab(s) orally once a day (04 Oct 2022 11:00)  heparin 5000 units/mL injectable solution: 5000 unit(s) injectable 2 times a day (04 Oct 2022 11:00)  Keppra 100 mg/mL oral solution: 5 milliliter(s) by gastrostomy tube 2 times a day (04 Oct 2022 11:00)  midodrine 5 mg oral tablet: 1 tab(s) orally 3 times a day (04 Oct 2022 11:00)  Multiple Vitamins oral tablet: 1 tab(s) orally once a day via PEG (04 Oct 2022 11:00)  Tylenol 325 mg oral tablet: 2 tab(s) orally once a day (04 Oct 2022 11:00)                           MEDICATIONS:  STANDING MEDICATIONS  acetaminophen     Tablet .. 650 milliGRAM(s) Oral once  aspirin  chewable 81 milliGRAM(s) Oral daily  atorvastatin 80 milliGRAM(s) Oral at bedtime  cefepime   IVPB 1000 milliGRAM(s) IV Intermittent every 12 hours  chlorhexidine 0.12% Liquid 15 milliLiter(s) Oral Mucosa every 12 hours  ergocalciferol Drops 8000 Unit(s) Oral daily  famotidine  Oral Tab/Cap - Peds 20 milliGRAM(s) Enteral Tube two times a day  ferrous    sulfate Liquid 300 milliGRAM(s) Oral daily  folic acid 1 milliGRAM(s) Oral daily  heparin   Injectable 5000 Unit(s) SubCutaneous every 12 hours  insulin lispro (ADMELOG) corrective regimen sliding scale   SubCutaneous three times a day before meals  ipratropium 17 MICROgram(s) HFA Inhaler 1 Puff(s) Inhalation every 6 hours  levETIRAcetam 500 milliGRAM(s) Oral two times a day  midodrine. 5 milliGRAM(s) Oral three times a day  multivitamin 1 Tablet(s) Oral daily  tamsulosin 0.4 milliGRAM(s) Oral at bedtime    PRN MEDICATIONS  acetaminophen     Tablet .. 650 milliGRAM(s) Oral every 6 hours PRN  ALBUTerol    0.083%. 2.5 milliGRAM(s) Nebulizer once PRN                                            ------------------------------------------------------------  VITAL SIGNS: Last 24 Hours  T(C): 36.8 (11 Oct 2022 12:38), Max: 36.8 (11 Oct 2022 12:38)  T(F): 98.2 (11 Oct 2022 12:38), Max: 98.2 (11 Oct 2022 12:38)  HR: 70 (11 Oct 2022 12:38) (70 - 81)  BP: 120/61 (11 Oct 2022 12:38) (96/58 - 120/61)  BP(mean): 71 (10 Oct 2022 19:13) (71 - 71)  RR: 18 (11 Oct 2022 12:38) (16 - 18)  SpO2: 100% (11 Oct 2022 12:38) (97% - 100%)      10-10-22 @ 07:01  -  10-11-22 @ 07:00  --------------------------------------------------------  IN: 930 mL / OUT: 0 mL / NET: 930 mL    10-11-22 @ 07:01  -  10-11-22 @ 13:01  --------------------------------------------------------  IN: 0 mL / OUT: 1500 mL / NET: -1500 mL                                             --------------------------------------------------------------  LABS:                        8.1    7.86  )-----------( 340      ( 11 Oct 2022 08:55 )             25.9     10-11    144  |  107  |  27<H>  ----------------------------<  144<H>  4.3   |  26  |  1.1    Ca    9.0      11 Oct 2022 08:55  Mg     2.0     10-11    TPro  5.9<L>  /  Alb  2.4<L>  /  TBili  <0.2  /  DBili  x   /  AST  24  /  ALT  20  /  AlkPhos  90  10-11                                                              -------------------------------------------------------------  RADIOLOGY:                                            --------------------------------------------------------------    PHYSICAL EXAM:  Constitutional: NAD, trach/PEG  HEENT: PERR, EOMI, Normal Hearing, MMM  Neck: Soft and supple, No LAD, No JVD  Respiratory: Breath sounds are clear bilaterally, No wheezing, rales or rhonchi  Cardiovascular: S1 and S2, regular rate and rhythm, no Murmurs, gallops or rubs  Gastrointestinal: Bowel Sounds present, soft, nontender, nondistended, no guarding, no rebound  Extremities: No peripheral edema

## 2022-10-11 NOTE — PROGRESS NOTE ADULT - SUBJECTIVE AND OBJECTIVE BOX
Patient is a 62y old  Male who presents with a chief complaint of MICHELLE (elevated BUN and Cr) (10 Oct 2022 13:11)        Over Night Events:  On MV.  Off pressors.          ROS:     All ROS are negative except HPI         PHYSICAL EXAM    ICU Vital Signs Last 24 Hrs  T(C): 36 (11 Oct 2022 05:00), Max: 36.7 (10 Oct 2022 12:37)  T(F): 96.8 (11 Oct 2022 05:00), Max: 98 (10 Oct 2022 12:37)  HR: 80 (11 Oct 2022 05:00) (72 - 81)  BP: 103/59 (11 Oct 2022 05:00) (94/55 - 103/59)  BP(mean): 71 (10 Oct 2022 19:13) (71 - 71)  ABP: --  ABP(mean): --  RR: 18 (11 Oct 2022 05:00) (16 - 18)  SpO2: 99% (11 Oct 2022 04:23) (97% - 100%)    O2 Parameters below as of 10 Oct 2022 19:13  Patient On (Oxygen Delivery Method): ventilator            CONSTITUTIONAL:  In  NAD    ENT:   Airway patent,   Mouth with normal mucosa.   Trach     EYES:   Pupils equal,   Round and reactive to light.    CARDIAC:   Normal rate,   Regular rhythm.      RESPIRATORY:   No wheezing  Bilateral BS  Normal chest expansion  Not tachypneic,  No use of accessory muscles    GASTROINTESTINAL:  Abdomen soft,   Non-tender,   No guarding,   + BS    MUSCULOSKELETAL:   Range of motion is not limited,  No clubbing, cyanosis    NEUROLOGICAL:   Does not follow commands     SKIN:   Skin normal color for race,   No evidence of rash.    PSYCHIATRIC:   No apparent risk to self or others.          10-09-22 @ 07:01  -  10-10-22 @ 07:00  --------------------------------------------------------  IN:    dextrose 5%: 900 mL    Glucerna: 480 mL  Total IN: 1380 mL    OUT:    Ureteral Catheter (mL): 1400 mL  Total OUT: 1400 mL    Total NET: -20 mL      10-10-22 @ 07:01  -  10-11-22 @ 06:55  --------------------------------------------------------  IN:    Enteral Tube Flush: 450 mL    Glucerna: 480 mL  Total IN: 930 mL    OUT:  Total OUT: 0 mL    Total NET: 930 mL          LABS:                            8.0    12.88 )-----------( 383      ( 10 Oct 2022 12:04 )             24.6                                               10-10    139  |  105  |  27<H>  ----------------------------<  130<H>  4.0   |  24  |  1.1    Ca    8.6      10 Oct 2022 12:04  Mg     1.8     10-10    TPro  5.3<L>  /  Alb  2.3<L>  /  TBili  0.2  /  DBili  x   /  AST  10  /  ALT  9   /  AlkPhos  69  10-09                                                                                           LIVER FUNCTIONS - ( 09 Oct 2022 17:12 )  Alb: 2.3 g/dL / Pro: 5.3 g/dL / ALK PHOS: 69 U/L / ALT: 9 U/L / AST: 10 U/L / GGT: x                                                                                               Mode: AC/ CMV (Assist Control/ Continuous Mandatory Ventilation)  RR (machine): 15  TV (machine): 400  FiO2: 40  PEEP: 5  ITime: 1  MAP: 8  PIP: 16                                          MEDICATIONS  (STANDING):  acetaminophen     Tablet .. 650 milliGRAM(s) Oral once  aspirin  chewable 81 milliGRAM(s) Oral daily  atorvastatin 80 milliGRAM(s) Oral at bedtime  cefepime   IVPB 1000 milliGRAM(s) IV Intermittent every 12 hours  chlorhexidine 0.12% Liquid 15 milliLiter(s) Oral Mucosa every 12 hours  ergocalciferol Drops 8000 Unit(s) Oral daily  famotidine  Oral Tab/Cap - Peds 20 milliGRAM(s) Enteral Tube two times a day  ferrous    sulfate Liquid 300 milliGRAM(s) Oral daily  folic acid 1 milliGRAM(s) Oral daily  heparin   Injectable 5000 Unit(s) SubCutaneous every 12 hours  insulin lispro (ADMELOG) corrective regimen sliding scale   SubCutaneous three times a day before meals  ipratropium 17 MICROgram(s) HFA Inhaler 1 Puff(s) Inhalation every 6 hours  levETIRAcetam 500 milliGRAM(s) Oral two times a day  midodrine. 5 milliGRAM(s) Oral three times a day  multivitamin 1 Tablet(s) Oral daily  tamsulosin 0.4 milliGRAM(s) Oral at bedtime    MEDICATIONS  (PRN):  acetaminophen     Tablet .. 650 milliGRAM(s) Oral every 6 hours PRN Temp greater or equal to 38C (100.4F), Mild Pain (1 - 3)  ALBUTerol    0.083%. 2.5 milliGRAM(s) Nebulizer once PRN Wheezing      New X-rays reviewed:                                                                                  ECHO    CXR interpreted by me:

## 2022-10-11 NOTE — PROGRESS NOTE ADULT - ASSESSMENT
62YOM with PMH of ICH sp trach/PEG, HTN/HLD, CAD, hx of c.diff who was brought in by EMS from [facility] for evaluation of elevated Cr to 4.2 (baseline 0.9) and BUN to 120 (18 in 2022). Pt was in acute urinary retention and is now sp teixeira catheter with >1.3L urine output    #MICHELLE, likely post-obstructive: RESOLVED  #Urinary retention sp teixeira catheter  - Cont monitoring BMP, and replete electrolytes for post-obstructive diuresis  - Cr 4.2 (o/a) > 3.9 > 3.2 >1.9 > 1.1 .   - d/cony d5, Follow up bmp      #SIRS (Low grade temp, tachycardia, leukocytosis noted on 10/6); Not POA  Differentials: Suspected UTI (UA 58 WBC, Large LE, Urine & Blood Cxs Neg) vs Left base PNA   CT findin. Partially imaged left pleural effusion with left lower lobe   collapse/atelectasis.  3. Areas of bowel wall thickening throughout the colon, most pronounced   at the cecum and rectum. Findings may reflect proctocolitis in   appropriate clinical setting. Trace abdominopelvic ascites.    - D/cony ceftriaxone per ID on 10/6 to watch off ABx. Procalc 0.33.  - 10/9: 100.8, WBC high. Started CEfepime 1gm Q12 per ID's last note.   - CXR 10/10 stable, no new infiltrates  - As per ID - continue with cefepime, Follow up tacheal and blood Cx  - Pending read of scrotal US    #Hypernatremia:  149 (10/7) > 144.    c/w D5W@75. increased PEG Free water flushes.  d/cony D5    #2.4 cm nodular soft tissue density along the right posterior bladder wall (CT A/P Dec 2021):  Per Urology repeat CT A/P with IV contrast. (since MICHELLE resolved now) =  1. Under distended urinary bladder limits evaluation. Circumferential   bladder wall thickening with irregular posterior bladder wall masslike   thickening.    - Repeat PSA 2.20(2.24 in )  As per urology  - spoke to patients wife and made aware of CT findings, family does not want cystoscopy now but okay with TURB when patient stable    Pending repeat PSA levels   Cont. Flomax   Outpatient Urodynamic testing vs inpatient TOV?? - will confirm with Urology.       #Why RLE precautions bracelet? :   LE duplex neg. Check with patient's NH.     #Hypokalemia: Replete. monitor.     #Full thickness decubital ulcer, present on admission  -nonpurulent, no discharge  -afebrile  -no elevation in WBC  -burn/wound team eval    #hx of hypotension  -BP on exam 130/70  -patient takes midodrine 5 q8hrs at home with holding parameters  -monitor BP    #T2DM  -continue lispro before meals and at bedtime  -monitor fingersticks     #HLD  -continue atorvastatin 80 daily     #Posterior fossa hemorrhage in  complicated by interventricular hemorrhage and hydrocephalus  Pt sp trach/peg  - Follow Pulm for vent management    DVT PPX, heparin    #Progress Note Handoff  Pending (specify): Follow up tracheal and blood Cx, Scrotal US read

## 2022-10-11 NOTE — PROGRESS NOTE ADULT - ASSESSMENT
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 diff colitis  HO Urinary retention  HO B/L Distal DVT  HO L pneumothorax  HO Aspiration pneumonia  Non verbal and does not track or follow commands at baseline.    PLAN:    CNS:  Pain control as needed    HEENT: Oral care.  Trach care     PULMONARY:  HOB @ 45 degrees. Vent changes: None. Target SpO2 92 to 96%, titrate oxygen as tolerated.    CARDIOVASCULAR: Avoid volume overload.     GI: GI prophylaxis. PEG feeding as tolerated. Bowel regimen     RENAL: Follow up lytes. Correct as needed. Urology follow up regarding Barney     INFECTIOUS DISEASE: ABX per ID    HEMATOLOGICAL: DVT prophylaxis. LE doppler -    ENDOCRINE:  Follow up FS. Target -180.     MUSCULOSKELETAL: Wound care nurse eval.     Vent unit    DC Planing

## 2022-10-12 NOTE — PROGRESS NOTE ADULT - ASSESSMENT
62YOM with PMH of ICH sp trach/PEG, HTN/HLD, CAD, hx of c.diff who was brought in by EMS from [facility] for evaluation of elevated Cr to 4.2 (baseline 0.9) and BUN to 120 (18 in 2022). Pt was in acute urinary retention and is now sp teixeira catheter with >1.3L urine output    #MICHELLE, likely post-obstructive: RESOLVED  #Urinary retention sp teixeira catheter  - Cont monitoring BMP, and replete electrolytes for post-obstructive diuresis  - Cr 4.2 (o/a) > 3.9 > 3.2 >1.9 > 1.1 .   - d/cony d5, Follow up bmp      #SIRS (Low grade temp, tachycardia, leukocytosis noted on 10/6); Not POA  Differentials: Suspected UTI (UA 58 WBC, Large LE, Urine & Blood Cxs Neg) vs Left base PNA   CT findin. Partially imaged left pleural effusion with left lower lobe   collapse/atelectasis.  3. Areas of bowel wall thickening throughout the colon, most pronounced   at the cecum and rectum. Findings may reflect proctocolitis in   appropriate clinical setting. Trace abdominopelvic ascites.    - D/cony ceftriaxone per ID on 10/6 to watch off ABx. Procalc 0.33.  - 10/9: 100.8, WBC high. Started CEfepime 1gm Q12 per ID's last note.   - CXR 10/10 stable, no new infiltrates  - Bcx NGTD  - As per ID - continue with cefepime, Will continue till 10/15 which is 7 days in total  - Pending read of scrotal US    #Hypernatremia:  149 (10/7) > 144.    c/w D5W@75. increased PEG Free water flushes.  d/cony D5  Trending up, will consider increasing free water flush through peg    #2.4 cm nodular soft tissue density along the right posterior bladder wall (CT A/P Dec 2021):  Per Urology repeat CT A/P with IV contrast. (since MICHELLE resolved now) =  1. Under distended urinary bladder limits evaluation. Circumferential   bladder wall thickening with irregular posterior bladder wall masslike   thickening.    - Repeat PSA 2.20(2.24 in )  As per urology  - spoke to patients wife and made aware of CT findings, family does not want cystoscopy now but okay with TURB when patient stable    Pending repeat PSA levels   Cont. Flomax   Outpatient Urodynamic testing vs inpatient TOV?? - will confirm with Urology.       #Why RLE precautions bracelet? :   LE duplex neg. Check with patient's NH.     #Hypokalemia: Replete. monitor.     #Full thickness decubital ulcer, present on admission  -nonpurulent, no discharge  -afebrile  -no elevation in WBC  -burn/wound team eval    #hx of hypotension  -BP on exam 130/70  -patient takes midodrine 5 q8hrs at home with holding parameters  -monitor BP    #T2DM  -continue lispro before meals and at bedtime  -monitor fingersticks     #HLD  -continue atorvastatin 80 daily     #Posterior fossa hemorrhage in  complicated by interventricular hemorrhage and hydrocephalus  Pt sp trach/peg  - Follow Pulm for vent management    DVT PPX, heparin    #Progress Note Handoff  Pending (specify): continue with cefepime until 10/15, monitor Na, Follow up final Bcx read, call patients wife to redo MOLST form this admission

## 2022-10-12 NOTE — CHART NOTE - NSCHARTNOTEFT_GEN_A_CORE
NUTRITION SUPPORT TEAM  -  PROGRESS NOTE     Interval Events:        REVIEW OF SYSTEMS:  Negative except as noted above.     VITALS:  T(F): 99 (10-12 @ 05:12), Max: 99 (10-12 @ 05:12)  HR: 72 (10-12 @ 09:26) (72 - 80)  BP: 111/53 (10-12 @ 05:12) (111/53 - 111/53)  RR: 18 (10-12 @ 05:12) (18 - 18)  SpO2: 100% (10-12 @ 09:26) (100% - 100%)    HEIGHT/WEIGHT/BMI:   Height (cm): 180.3 (10-04), 177.8 (11-23)  Weight (kg): 66.4 (10-04), 86.8 (11-23)  BMI (kg/m2): 20.4 (10-04), 27.5 (11-23)    I/Os:     10-11-22 @ 07:01  -  10-12-22 @ 07:00  --------------------------------------------------------  IN:    Enteral Tube Flush: 400 mL    Glucerna: 480 mL  Total IN: 880 mL    OUT:    Ureteral Catheter (mL): 1950 mL  Total OUT: 1950 mL    Total NET: -1070 mL    MEDICATIONS:   acetaminophen     Tablet .. 650 milliGRAM(s) Oral every 6 hours PRN  acetaminophen     Tablet .. 650 milliGRAM(s) Oral once  ALBUTerol    0.083%. 2.5 milliGRAM(s) Nebulizer once PRN  aspirin  chewable 81 milliGRAM(s) Oral daily  atorvastatin 80 milliGRAM(s) Oral at bedtime  cefepime   IVPB 1000 milliGRAM(s) IV Intermittent every 12 hours  chlorhexidine 0.12% Liquid 15 milliLiter(s) Oral Mucosa every 12 hours  ergocalciferol Drops 8000 Unit(s) Oral daily  famotidine  Oral Tab/Cap - Peds 20 milliGRAM(s) Enteral Tube two times a day  ferrous    sulfate Liquid 300 milliGRAM(s) Oral daily  folic acid 1 milliGRAM(s) Oral daily  heparin   Injectable 5000 Unit(s) SubCutaneous every 12 hours  insulin lispro (ADMELOG) corrective regimen sliding scale   SubCutaneous three times a day before meals  ipratropium 17 MICROgram(s) HFA Inhaler 1 Puff(s) Inhalation every 6 hours  levETIRAcetam 500 milliGRAM(s) Oral two times a day  midodrine. 5 milliGRAM(s) Oral three times a day  multivitamin 1 Tablet(s) Oral daily  tamsulosin 0.4 milliGRAM(s) Oral at bedtime    LABS:                         7.3    8.84  )-----------( 343      ( 12 Oct 2022 04:30 )             24.7     145  |  108  |  25<H>  ----------------------------<  136<H>          (10-12-22 @ 04:30)  4.5   |  25  |  1.1    Ca    8.4          (10-12-22 @ 04:30)  Mg     1.9         (10-12-22 @ 04:30)    TPro  5.3<L>  /  Alb  2.2<L>  /  TBili  <0.2  /  DBili  x   /  AST  21  /  ALT  16  /  AlkPhos  82       10-12-22 @ 04:30    Vitamin D, 25-Hydroxy: 46 ng/mL (10-07 @ 09:40)    Blood Glucose (Past 24 hours):  173 mg/dL (10-12 @ 07:41)  145 mg/dL (10-12 @ 05:09)  140 mg/dL (10-11 @ 18:07)  144 mg/dL (10-11 @ 12:32)    DIET:   Diet, NPO with Tube Feed:   Tube Feeding Modality: Gastrostomy  Glucerna 1.2 Blake  Total Volume for 24 Hours (mL): 960  Bolus  Total Volume of Bolus (mL):  240  Tube Feed Frequency: Every 6 hours   Tube Feed Start Time: 12:00  Bolus Feed Rate (mL per Hour): 500   Bolus Feed Duration (in Hours): 0.5  Free Water Flush Instructions:  water by syringe push 50 ml before and 150 ml after each feeing (10-06-22 @ 11:55) [Active]    ASSESSMENT  62 M with PMH of posterior fossa hemorrhage in 2021, s/p trach and PEG, HTN, CAD (on DAPT), DM2. Now admitted with MICHELLE and UTI.       PLAN    - please add in phos to daily chems  - increase feeds to Glucerna 1.2 360ml X 4 feeds/day for now  - re-eval ergocalciferol dosing based on current vitamin D level  - monitor BM's  - glycemic control  - will follow NUTRITION SUPPORT TEAM  -  PROGRESS NOTE     Interval Events:    Vent dependent  Tolerating GT feeds with Glucerna 1.2  Lytes NL    REVIEW OF SYSTEMS:  Negative except as noted above.     VITALS:  T(F): 99 (10-12 @ 05:12), Max: 99 (10-12 @ 05:12)  HR: 72 (10-12 @ 09:26) (72 - 80)  BP: 111/53 (10-12 @ 05:12) (111/53 - 111/53)  RR: 18 (10-12 @ 05:12) (18 - 18)  SpO2: 100% (10-12 @ 09:26) (100% - 100%)    HEIGHT/WEIGHT/BMI:   Height (cm): 180.3 (10-04), 177.8 (11-23)  Weight (kg): 66.4 (10-04), 86.8 (11-23)  BMI (kg/m2): 20.4 (10-04), 27.5 (11-23)    I/Os:     10-11-22 @ 07:01  -  10-12-22 @ 07:00  --------------------------------------------------------  IN:    Enteral Tube Flush: 400 mL    Glucerna: 480 mL  Total IN: 880 mL    OUT:    Ureteral Catheter (mL): 1950 mL  Total OUT: 1950 mL    Total NET: -1070 mL    MEDICATIONS:   acetaminophen     Tablet .. 650 milliGRAM(s) Oral every 6 hours PRN  acetaminophen     Tablet .. 650 milliGRAM(s) Oral once  ALBUTerol    0.083%. 2.5 milliGRAM(s) Nebulizer once PRN  aspirin  chewable 81 milliGRAM(s) Oral daily  atorvastatin 80 milliGRAM(s) Oral at bedtime  cefepime   IVPB 1000 milliGRAM(s) IV Intermittent every 12 hours  chlorhexidine 0.12% Liquid 15 milliLiter(s) Oral Mucosa every 12 hours  ergocalciferol Drops 8000 Unit(s) Oral daily  famotidine  Oral Tab/Cap - Peds 20 milliGRAM(s) Enteral Tube two times a day  ferrous    sulfate Liquid 300 milliGRAM(s) Oral daily  folic acid 1 milliGRAM(s) Oral daily  heparin   Injectable 5000 Unit(s) SubCutaneous every 12 hours  insulin lispro (ADMELOG) corrective regimen sliding scale   SubCutaneous three times a day before meals  ipratropium 17 MICROgram(s) HFA Inhaler 1 Puff(s) Inhalation every 6 hours  levETIRAcetam 500 milliGRAM(s) Oral two times a day  midodrine. 5 milliGRAM(s) Oral three times a day  multivitamin 1 Tablet(s) Oral daily  tamsulosin 0.4 milliGRAM(s) Oral at bedtime    LABS:                         7.3    8.84  )-----------( 343      ( 12 Oct 2022 04:30 )             24.7     145  |  108  |  25<H>  ----------------------------<  136<H>          (10-12-22 @ 04:30)  4.5   |  25  |  1.1    Ca    8.4          (10-12-22 @ 04:30)  Mg     1.9         (10-12-22 @ 04:30)    TPro  5.3<L>  /  Alb  2.2<L>  /  TBili  <0.2  /  DBili  x   /  AST  21  /  ALT  16  /  AlkPhos  82       10-12-22 @ 04:30    Vitamin D, 25-Hydroxy: 46 ng/mL (10-07 @ 09:40)    Blood Glucose (Past 24 hours):  173 mg/dL (10-12 @ 07:41)  145 mg/dL (10-12 @ 05:09)  140 mg/dL (10-11 @ 18:07)  144 mg/dL (10-11 @ 12:32)    DIET:   Diet, NPO with Tube Feed:   Tube Feeding Modality: Gastrostomy  Glucerna 1.2 Blake  Total Volume for 24 Hours (mL): 960  Bolus  Total Volume of Bolus (mL):  240  Tube Feed Frequency: Every 6 hours   Tube Feed Start Time: 12:00  Bolus Feed Rate (mL per Hour): 500   Bolus Feed Duration (in Hours): 0.5  Free Water Flush Instructions:  water by syringe push 50 ml before and 150 ml after each feeing (10-06-22 @ 11:55) [Active]    ASSESSMENT  62 M with PMH of posterior fossa hemorrhage in 2021, s/p trach and PEG, HTN, CAD (on DAPT), DM2. Now admitted with MICHELLE and UTI.       PLAN    - please add in phos to daily chems  - increase feeds to Glucerna 1.2 360ml X 4 feeds/day for now  - re-eval ergocalciferol dosing based on current vitamin D level  - monitor BM's  - glycemic control  - will follow NUTRITION SUPPORT TEAM  -  PROGRESS NOTE     Interval Events:    Vent dependent  Tolerating GT feeds with Glucerna 1.2  BMs not documented  Lytes NL    REVIEW OF SYSTEMS:  Negative except as noted above.     VITALS:  T(F): 99 (10-12 @ 05:12), Max: 99 (10-12 @ 05:12)  HR: 72 (10-12 @ 09:26) (72 - 80)  BP: 111/53 (10-12 @ 05:12) (111/53 - 111/53)  RR: 18 (10-12 @ 05:12) (18 - 18)  SpO2: 100% (10-12 @ 09:26) (100% - 100%)    HEIGHT/WEIGHT/BMI:   Height (cm): 180.3 (10-04), 177.8 (11-23)  Weight (kg): 66.4 (10-04), 86.8 (11-23)  BMI (kg/m2): 20.4 (10-04), 27.5 (11-23)    I/Os:   10-11-22 @ 07:01  -  10-12-22 @ 07:00  --------------------------------------------------------  IN:    Enteral Tube Flush: 400 mL    Glucerna: 480 mL  Total IN: 880 mL  OUT:    Ureteral Catheter (mL): 1950 mL  Total OUT: 1950 mL  Total NET: -1070 mL    MEDICATIONS:   acetaminophen     Tablet .. 650 milliGRAM(s) Oral every 6 hours PRN  acetaminophen     Tablet .. 650 milliGRAM(s) Oral once  ALBUTerol    0.083%. 2.5 milliGRAM(s) Nebulizer once PRN  aspirin  chewable 81 milliGRAM(s) Oral daily  atorvastatin 80 milliGRAM(s) Oral at bedtime  cefepime   IVPB 1000 milliGRAM(s) IV Intermittent every 12 hours  chlorhexidine 0.12% Liquid 15 milliLiter(s) Oral Mucosa every 12 hours  ergocalciferol Drops 8000 Unit(s) Oral daily  famotidine  Oral Tab/Cap - Peds 20 milliGRAM(s) Enteral Tube two times a day  ferrous    sulfate Liquid 300 milliGRAM(s) Oral daily  folic acid 1 milliGRAM(s) Oral daily  heparin   Injectable 5000 Unit(s) SubCutaneous every 12 hours  insulin lispro (ADMELOG) corrective regimen sliding scale   SubCutaneous three times a day before meals  ipratropium 17 MICROgram(s) HFA Inhaler 1 Puff(s) Inhalation every 6 hours  levETIRAcetam 500 milliGRAM(s) Oral two times a day  midodrine. 5 milliGRAM(s) Oral three times a day  multivitamin 1 Tablet(s) Oral daily  tamsulosin 0.4 milliGRAM(s) Oral at bedtime    LABS:                         7.3    8.84  )-----------( 343      ( 12 Oct 2022 04:30 )             24.7     145  |  108  |  25<H>  ----------------------------<  136<H>          (10-12-22 @ 04:30)  4.5   |  25  |  1.1    Ca    8.4          (10-12-22 @ 04:30)  Mg     1.9         (10-12-22 @ 04:30)    TPro  5.3<L>  /  Alb  2.2<L>  /  TBili  <0.2  /  DBili  x   /  AST  21  /  ALT  16  /  AlkPhos  82       10-12-22 @ 04:30    Vitamin D, 25-Hydroxy: 46 ng/mL (10-07 @ 09:40)    Blood Glucose (Past 24 hours):  173 mg/dL (10-12 @ 07:41)  145 mg/dL (10-12 @ 05:09)  140 mg/dL (10-11 @ 18:07)  144 mg/dL (10-11 @ 12:32)    DIET:   Diet, NPO with Tube Feed:   Tube Feeding Modality: Gastrostomy  Glucerna 1.2 Blake  Total Volume for 24 Hours (mL): 960  Bolus  Total Volume of Bolus (mL):  240  Tube Feed Frequency: Every 6 hours   Tube Feed Start Time: 12:00  Bolus Feed Rate (mL per Hour): 500   Bolus Feed Duration (in Hours): 0.5  Free Water Flush Instructions:  water by syringe push 50 ml before and 150 ml after each feeing (10-06-22 @ 11:55) [Active]    ASSESSMENT  62 M with PMH of posterior fossa hemorrhage in 2021, s/p trach and PEG, HTN, CAD (on DAPT), DM2. Now admitted with MICHELLE and UTI.       PLAN    - please add in phos to daily chems  - increase feeds to Glucerna 1.2 360ml X 4 feeds/day for now  - re-eval ergocalciferol dosing based on current vitamin D level  - monitor BM's  - glycemic control  - will follow

## 2022-10-12 NOTE — PROGRESS NOTE ADULT - SUBJECTIVE AND OBJECTIVE BOX
Patient is a 62y old  Male who presents with a chief complaint of MICHELLE (elevated BUN and Cr) (11 Oct 2022 13:01)        Over Night Events:  on MV.  off pressors.          ROS:     All ROS are negative except HPI         PHYSICAL EXAM    ICU Vital Signs Last 24 Hrs  T(C): 37.2 (12 Oct 2022 05:12), Max: 37.4 (11 Oct 2022 20:38)  T(F): 99 (12 Oct 2022 05:12), Max: 99.3 (11 Oct 2022 20:38)  HR: 80 (12 Oct 2022 05:12) (70 - 85)  BP: 111/53 (12 Oct 2022 05:12) (107/61 - 120/61)  BP(mean): --  ABP: --  ABP(mean): --  RR: 18 (12 Oct 2022 05:12) (18 - 19)  SpO2: 100% (12 Oct 2022 04:13) (100% - 100%)    O2 Parameters below as of 11 Oct 2022 20:38  Patient On (Oxygen Delivery Method): ventilator            CONSTITUTIONAL:  In NAD    ENT:   Airway patent,   Mouth with normal mucosa.   Trach     EYES:   Pupils equal,   Round and reactive to light.    CARDIAC:   Normal rate,   Regular rhythm.        RESPIRATORY:   No wheezing  Bilateral BS  Normal chest expansion  Not tachypneic,  No use of accessory muscles    GASTROINTESTINAL:  Abdomen soft,   Non-tender,   No guarding,   + BS    MUSCULOSKELETAL:   No clubbing, cyanosis    NEUROLOGICAL:   Does not follow commands     SKIN:   Skin normal color for race,   No evidence of rash.    PSYCHIATRIC:    No apparent risk to self or others.        10-10-22 @ 07:01  -  10-11-22 @ 07:00  --------------------------------------------------------  IN:    Enteral Tube Flush: 450 mL    Glucerna: 480 mL  Total IN: 930 mL    OUT:  Total OUT: 0 mL    Total NET: 930 mL      10-11-22 @ 07:01  -  10-12-22 @ 06:58  --------------------------------------------------------  IN:    Enteral Tube Flush: 400 mL    Glucerna: 480 mL  Total IN: 880 mL    OUT:    Ureteral Catheter (mL): 1950 mL  Total OUT: 1950 mL    Total NET: -1070 mL          LABS:                            8.1    7.86  )-----------( 340      ( 11 Oct 2022 08:55 )             25.9                                               10-11    144  |  107  |  27<H>  ----------------------------<  144<H>  4.3   |  26  |  1.1    Ca    9.0      11 Oct 2022 08:55  Mg     2.0     10-11    TPro  5.9<L>  /  Alb  2.4<L>  /  TBili  <0.2  /  DBili  x   /  AST  24  /  ALT  20  /  AlkPhos  90  10-11                                                                                           LIVER FUNCTIONS - ( 11 Oct 2022 08:55 )  Alb: 2.4 g/dL / Pro: 5.9 g/dL / ALK PHOS: 90 U/L / ALT: 20 U/L / AST: 24 U/L / GGT: x                                                  Culture - Blood (collected 10 Oct 2022 12:04)  Source: .Blood None  Preliminary Report (11 Oct 2022 18:02):    No growth to date.                                                   Mode: AC/ CMV (Assist Control/ Continuous Mandatory Ventilation)  RR (machine): 15  TV (machine): 400  FiO2: 40  PEEP: 5  ITime: 1  MAP: 8  PIP: 14                                          MEDICATIONS  (STANDING):  acetaminophen     Tablet .. 650 milliGRAM(s) Oral once  aspirin  chewable 81 milliGRAM(s) Oral daily  atorvastatin 80 milliGRAM(s) Oral at bedtime  cefepime   IVPB 1000 milliGRAM(s) IV Intermittent every 12 hours  chlorhexidine 0.12% Liquid 15 milliLiter(s) Oral Mucosa every 12 hours  ergocalciferol Drops 8000 Unit(s) Oral daily  famotidine  Oral Tab/Cap - Peds 20 milliGRAM(s) Enteral Tube two times a day  ferrous    sulfate Liquid 300 milliGRAM(s) Oral daily  folic acid 1 milliGRAM(s) Oral daily  heparin   Injectable 5000 Unit(s) SubCutaneous every 12 hours  insulin lispro (ADMELOG) corrective regimen sliding scale   SubCutaneous three times a day before meals  ipratropium 17 MICROgram(s) HFA Inhaler 1 Puff(s) Inhalation every 6 hours  levETIRAcetam 500 milliGRAM(s) Oral two times a day  midodrine. 5 milliGRAM(s) Oral three times a day  multivitamin 1 Tablet(s) Oral daily  tamsulosin 0.4 milliGRAM(s) Oral at bedtime    MEDICATIONS  (PRN):  acetaminophen     Tablet .. 650 milliGRAM(s) Oral every 6 hours PRN Temp greater or equal to 38C (100.4F), Mild Pain (1 - 3)  ALBUTerol    0.083%. 2.5 milliGRAM(s) Nebulizer once PRN Wheezing      New X-rays reviewed:                                                                                  ECHO    CXR interpreted by me:

## 2022-10-12 NOTE — PROGRESS NOTE ADULT - SUBJECTIVE AND OBJECTIVE BOX
ALICIA BARBOUR  62y, Male  Allergy: penicillin (Other)      LOS  8d    CHIEF COMPLAINT: MICHELLE (elevated BUN and Cr) (12 Oct 2022 13:44)      INTERVAL EVENTS/HPI  - No acute events overnight, started on cefepime for PNA, fiO2 40  - T(F): , Max: 99.3 (10-11-22 @ 20:38)  - Tolerating medication  - WBC Count: 8.84 (10-12-22 @ 04:30)  WBC Count: 7.86 (10-11-22 @ 08:55)     - Creatinine, Serum: 1.1 (10-12-22 @ 04:30)  Creatinine, Serum: 1.1 (10-11-22 @ 08:55)       ROS  unable to obtain history secondary to patient's mental status and/or sedation     VITALS:  T(F): 98.9, Max: 99.3 (10-11-22 @ 20:38)  HR: 81  BP: 118/56  RR: 17Vital Signs Last 24 Hrs  T(C): 37.2 (12 Oct 2022 12:16), Max: 37.4 (11 Oct 2022 20:38)  T(F): 98.9 (12 Oct 2022 12:16), Max: 99.3 (11 Oct 2022 20:38)  HR: 81 (12 Oct 2022 12:16) (72 - 85)  BP: 118/56 (12 Oct 2022 12:16) (107/61 - 118/56)  BP(mean): --  RR: 17 (12 Oct 2022 12:16) (17 - 19)  SpO2: 100% (12 Oct 2022 12:16) (100% - 100%)    Parameters below as of 11 Oct 2022 20:38  Patient On (Oxygen Delivery Method): ventilator        PHYSICAL EXAM:  Gen: trach/ vent  CV: RRR  Lungs: Decreased BS at bases  Abd: Soft  Neuro: does not follow commands  Skin: no rash   Lines clean, no phlebitis     FH: Non-contributory  Social Hx: Non-contributory    TESTS & MEASUREMENTS:                        7.3    8.84  )-----------( 343      ( 12 Oct 2022 04:30 )             24.7     10-12    145  |  108  |  25<H>  ----------------------------<  136<H>  4.5   |  25  |  1.1    Ca    8.4      12 Oct 2022 04:30  Mg     1.9     10-12    TPro  5.3<L>  /  Alb  2.2<L>  /  TBili  <0.2  /  DBili  x   /  AST  21  /  ALT  16  /  AlkPhos  82  10-12      LIVER FUNCTIONS - ( 12 Oct 2022 04:30 )  Alb: 2.2 g/dL / Pro: 5.3 g/dL / ALK PHOS: 82 U/L / ALT: 16 U/L / AST: 21 U/L / GGT: x               Culture - Blood (collected 10-10-22 @ 12:04)  Source: .Blood None  Preliminary Report (10-11-22 @ 18:02):    No growth to date.    Culture - Blood (collected 10-06-22 @ 11:34)  Source: .Blood None  Final Report (10-12-22 @ 01:01):    No Growth Final    Culture - Urine (collected 10-04-22 @ 04:32)  Source: Clean Catch Clean Catch (Midstream)  Final Report (10-05-22 @ 15:37):    <10,000 CFU/mL Normal Urogenital Susan    Culture - Blood (collected 10-04-22 @ 04:32)  Source: .Blood Blood-Peripheral  Final Report (10-09-22 @ 18:00):    No Growth Final    Culture - Blood (collected 10-04-22 @ 04:32)  Source: .Blood Blood-Peripheral  Final Report (10-09-22 @ 18:00):    No Growth Final            INFECTIOUS DISEASES TESTING  COVID-19 PCR: NotDetec (10-11-22 @ 04:20)  Procalcitonin, Serum: 0.86 (10-10-22 @ 12:04)  Procalcitonin, Serum: 0.33 (10-06-22 @ 11:34)  COVID-19 PCR: NotDetec (10-04-22 @ 00:43)  Rapid RVP Result: NotDetec (01-25-22 @ 06:33)  COVID-19 PCR: NotDetec (01-18-22 @ 07:03)  COVID-19 PCR: NotDetec (01-16-22 @ 09:30)  COVID-19 PCR: NotDetec (01-10-22 @ 07:05)  COVID-19 PCR: NotDetec (01-04-22 @ 07:28)  Procalcitonin, Serum: 0.15 (01-04-22 @ 07:23)  COVID-19 PCR: NotDetec (12-29-21 @ 06:47)  COVID-19 PCR: NotDetec (12-23-21 @ 14:38)  COVID-19 PCR: NotDetec (12-19-21 @ 11:41)  Rapid RVP Result: NotDetec (12-14-21 @ 10:25)  Procalcitonin, Serum: 0.45 (12-13-21 @ 22:38)  COVID-19 PCR: NotDetec (12-13-21 @ 06:13)  COVID-19 PCR: NotDetec (12-08-21 @ 05:43)  COVID-19 PCR: NotDetec (12-06-21 @ 06:55)  COVID-19 PCR: NotDetec (11-29-21 @ 16:09)  Procalcitonin, Serum: 0.34 (11-27-21 @ 05:56)  Rapid RVP Result: NotDetec (11-26-21 @ 09:59)  Procalcitonin, Serum: 0.26 (11-24-21 @ 13:13)  COVID-19 PCR: NotDetec (11-22-21 @ 18:00)  COVID-19 PCR: NotDetec (11-18-21 @ 17:16)  Procalcitonin, Serum: 0.17 (11-12-21 @ 19:21)  COVID-19 PCR: NotDetec (11-09-21 @ 01:21)  MRSA PCR Result.: NotDetec (11-05-21 @ 02:24)  COVID-19 PCR: NotDetec (11-04-21 @ 10:27)  strept    INFLAMMATORY MARKERS      RADIOLOGY & ADDITIONAL TESTS:  I have personally reviewed the last available Chest xray  CXR  Xray Chest 1 View- PORTABLE-Urgent:   ACC: [id number] EXAM:  XR CHEST PORTABLE URGENT 1V                          PROCEDURE DATE:  10/10/2022          INTERPRETATION:  Clinical History / Reason for exam: Fever, desaturation    Comparison : Chest radiograph October 6, 2022.    Technique/Positioning: Frontal chest radiograph.    Findings:    Support devices: Stable tracheostomy    Cardiac/mediastinum/hilum: Unchanged.    Lung parenchyma/Pleura: Left pleural effusion and left basilar opacity.   No pneumothorax.    Skeleton/soft tissues:Unchanged.    Impression:    Unchanged left pleural effusion and left basilar opacity.    --- End of Report ---            CRISTELA MORA MD; Attending Radiologist  This document has been electronically signed. Oct 10 2022  1:43PM (10-10-22 @ 11:08)      CT      CARDIOLOGY TESTING  12 Lead ECG:   Ventricular Rate 88 BPM    Atrial Rate 88 BPM    P-R Interval 272 ms    QRS Duration 88 ms    Q-T Interval 394 ms    QTC Calculation(Bazett) 476 ms    P Axis 74 degrees    R Axis 80 degrees    T Axis 79 degrees    Diagnosis Line Sinus rhythm with 1st degree A-V block  Otherwise normal ECG    Confirmed by Jv Valentino ([id number]) on 10/4/2022 4:25:53 PM (10-04-22 @ 10:09)      MEDICATIONS  acetaminophen     Tablet .. 650 Oral once  aspirin  chewable 81 Oral daily  atorvastatin 80 Oral at bedtime  cefepime   IVPB 1000 IV Intermittent every 12 hours  chlorhexidine 0.12% Liquid 15 Oral Mucosa every 12 hours  ergocalciferol Drops 8000 Oral daily  famotidine  Oral Tab/Cap - Peds 20 Enteral Tube two times a day  ferrous    sulfate Liquid 300 Oral daily  folic acid 1 Oral daily  heparin   Injectable 5000 SubCutaneous every 12 hours  insulin lispro (ADMELOG) corrective regimen sliding scale  SubCutaneous three times a day before meals  ipratropium 17 MICROgram(s) HFA Inhaler 1 Inhalation every 6 hours  levETIRAcetam 500 Oral two times a day  midodrine. 5 Oral three times a day  multivitamin 1 Oral daily  tamsulosin 0.4 Oral at bedtime      WEIGHT  Weight (kg): 66.4 (10-04-22 @ 22:39)  Creatinine, Serum: 1.1 mg/dL (10-12-22 @ 04:30)      ANTIBIOTICS:  cefepime   IVPB 1000 milliGRAM(s) IV Intermittent every 12 hours      All available historical records have been reviewed

## 2022-10-12 NOTE — PROGRESS NOTE ADULT - ASSESSMENT
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 diff colitis  HO Urinary retention  HO B/L Distal DVT  HO L pneumothorax  HO Aspiration pneumonia  Non verbal and does not track or follow commands at baseline.    PLAN:    CNS:  Pain control as needed    HEENT: Oral care.  Trach care     PULMONARY:  HOB @ 45 degrees. Vent changes: None. Target SpO2 92 to 96%, titrate oxygen as tolerated.    CARDIOVASCULAR: Avoid volume overload.     GI: GI prophylaxis. PEG feeding as tolerated. Bowel regimen     RENAL: Follow up lytes. Correct as needed.     INFECTIOUS DISEASE: ABX per ID    HEMATOLOGICAL: DVT prophylaxis. LE doppler -    ENDOCRINE:  Follow up FS. Target -180.     MUSCULOSKELETAL: Wound care.       Vent unit    DC Planing

## 2022-10-12 NOTE — PROGRESS NOTE ADULT - SUBJECTIVE AND OBJECTIVE BOX
ALICIA BARBOUR 62y Male  MRN#: [id number]   CODE STATUS:________    Hospital Day: 8d    Pt is currently admitted with the primary diagnosis of Urinary retention/MICHELLE    SUBJECTIVE  Hospital Course  62YOM with PMH of ICH sp trach/PEG, HTN/HLD, CAD, hx of c.diff who was brought in by EMS from [facility] for evaluation of elevated Cr to 4.2 (baseline 0.9) and BUN to 120 (18 in 01/2022). Pt was in acute urinary retention and is now sp teixeira catheter with >1.3L urine output. Cr back to baseline.     10/6 Low grade temp, Tachy, leukocytosis - but workup negative so d/cony Ab  10/8 - low grade fever restarted on cefepime  10/10 - sent trach cultures and blood Cx pending, continue with cefepime as per ID  10/11 - pending trach and blood cultures, scrotal US done pending read, continue with cefepime  10/12 - B.cx NGTD, Scrotal US negative, continue cefepime untill 10/15    Overnight events   None significant    Subjective complaints   No new complaints  Present Today:   - Teixeira:  No [  ], Yes [   ] : Indication:     - Type of IV Access:       .. CVC/Piccline:  No [  ], Yes [   ] : Indication:       .. Midline: No [  ], Yes [   ] : Indication:                                             ----------------------------------------------------------  OBJECTIVE  PAST MEDICAL & SURGICAL HISTORY  HTN (hypertension)    Diabetes mellitus                                              -----------------------------------------------------------  ALLERGIES:  penicillin (Other)                                            ------------------------------------------------------------    HOME MEDICATIONS  Home Medications:  albuterol 2.5 mg/3 mL (0.083%) inhalation solution: 1 unit(s) inhaled every 4 hours, As Needed (04 Oct 2022 11:00)  aspirin 81 mg oral tablet, chewable: 1 tab(s) orally once a day (04 Oct 2022 11:00)  atorvastatin 80 mg oral tablet: 1 tab(s) orally once a day (04 Oct 2022 11:00)  Atrovent 18 mcg/inh inhalation aerosol: 1 unit(s) inhaled every 6 hours (04 Oct 2022 11:00)  ergocalciferol 200 mcg/mL (8000 intl units/mL) oral solution: 0.25 milliliter(s) orally once a day (04 Oct 2022 11:00)  famotidine 10 mg oral tablet: 1 tab(s) orally 2 times a day (04 Oct 2022 11:00)  ferrous sulfate 220 mg/5 mL (44 mg/5 mL elemental iron) oral elixir: 7.5 milliliter(s) by gastrostomy tube once a day (04 Oct 2022 11:00)  folic acid 1 mg oral tablet: 1 tab(s) orally once a day (04 Oct 2022 11:00)  heparin 5000 units/mL injectable solution: 5000 unit(s) injectable 2 times a day (04 Oct 2022 11:00)  Keppra 100 mg/mL oral solution: 5 milliliter(s) by gastrostomy tube 2 times a day (04 Oct 2022 11:00)  midodrine 5 mg oral tablet: 1 tab(s) orally 3 times a day (04 Oct 2022 11:00)  Multiple Vitamins oral tablet: 1 tab(s) orally once a day via PEG (04 Oct 2022 11:00)  Tylenol 325 mg oral tablet: 2 tab(s) orally once a day (04 Oct 2022 11:00)                           MEDICATIONS:  STANDING MEDICATIONS  acetaminophen     Tablet .. 650 milliGRAM(s) Oral once  aspirin  chewable 81 milliGRAM(s) Oral daily  atorvastatin 80 milliGRAM(s) Oral at bedtime  cefepime   IVPB 1000 milliGRAM(s) IV Intermittent every 12 hours  chlorhexidine 0.12% Liquid 15 milliLiter(s) Oral Mucosa every 12 hours  ergocalciferol Drops 8000 Unit(s) Oral daily  famotidine  Oral Tab/Cap - Peds 20 milliGRAM(s) Enteral Tube two times a day  ferrous    sulfate Liquid 300 milliGRAM(s) Oral daily  folic acid 1 milliGRAM(s) Oral daily  heparin   Injectable 5000 Unit(s) SubCutaneous every 12 hours  insulin lispro (ADMELOG) corrective regimen sliding scale   SubCutaneous three times a day before meals  ipratropium 17 MICROgram(s) HFA Inhaler 1 Puff(s) Inhalation every 6 hours  levETIRAcetam 500 milliGRAM(s) Oral two times a day  midodrine. 5 milliGRAM(s) Oral three times a day  multivitamin 1 Tablet(s) Oral daily  tamsulosin 0.4 milliGRAM(s) Oral at bedtime    PRN MEDICATIONS  acetaminophen     Tablet .. 650 milliGRAM(s) Oral every 6 hours PRN  ALBUTerol    0.083%. 2.5 milliGRAM(s) Nebulizer once PRN                                            ------------------------------------------------------------  VITAL SIGNS: Last 24 Hours  T(C): 37.2 (12 Oct 2022 12:16), Max: 37.4 (11 Oct 2022 20:38)  T(F): 98.9 (12 Oct 2022 12:16), Max: 99.3 (11 Oct 2022 20:38)  HR: 81 (12 Oct 2022 12:16) (72 - 85)  BP: 118/56 (12 Oct 2022 12:16) (107/61 - 118/56)  BP(mean): --  RR: 17 (12 Oct 2022 12:16) (17 - 19)  SpO2: 100% (12 Oct 2022 12:16) (100% - 100%)      10-11-22 @ 07:01  -  10-12-22 @ 07:00  --------------------------------------------------------  IN: 880 mL / OUT: 1950 mL / NET: -1070 mL                                             --------------------------------------------------------------  LABS:                        7.3    8.84  )-----------( 343      ( 12 Oct 2022 04:30 )             24.7     10-12    145  |  108  |  25<H>  ----------------------------<  136<H>  4.5   |  25  |  1.1    Ca    8.4      12 Oct 2022 04:30  Mg     1.9     10-12    TPro  5.3<L>  /  Alb  2.2<L>  /  TBili  <0.2  /  DBili  x   /  AST  21  /  ALT  16  /  AlkPhos  82  10-12                Culture - Blood (collected 10 Oct 2022 12:04)  Source: .Blood None  Preliminary Report (11 Oct 2022 18:02):    No growth to date.                                                    -------------------------------------------------------------  RADIOLOGY:                                            --------------------------------------------------------------    PHYSICAL EXAM:  Constitutional: NAD, trach/PEG  HEENT: PERR, EOMI, Normal Hearing, MMM  Neck: Soft and supple, No LAD, No JVD, Trach +ve  Respiratory: Breath sounds are clear bilaterally, No wheezing, rales or rhonchi  Cardiovascular: S1 and S2, regular rate and rhythm, no Murmurs, gallops or rubs  Gastrointestinal: Bowel Sounds present, soft, nontender, nondistended, no guarding, no rebound  Extremities: No peripheral edema

## 2022-10-12 NOTE — PROGRESS NOTE ADULT - ASSESSMENT
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 diff colitis  -hx of urinary retention with urology follow up on recent admission  -hx of b/l distal DVT  -hx lf L pneumothorax  -hx aspiration pneumonia    who was brought in by EMS from [facility] for evaluation of elevated Cr to 4.2 (baseline 0.9)   ID consulted for UTI and sacral ulcer    IMPRESSION  #Fever, suspected VAP  Procalcitonin, Serum: 0.86 (10-10-22 @ 12:04)  Procalcitonin, Serum: 0.33 (10-06-22 @ 11:34)  # Sepsis ruled out on admission     Afebrile    No leukocytosis   10/4 Blood & Urine cxs NGTD   UA Blood: x / Protein: 30 mg/dL / Nitrite: Negative   Leuk Esterase: Large / RBC: 4 /HPF / WBC 58 /HPF   Sq Epi: x / Non Sq Epi: 7 /HPF / Bacteria: Negative  #Left-sided pleural effusion is of uncertain etiology.  #Trach/PEG  #Abx allergy: penicillin (Other)  #MICHELLE- urinary retention s/p teixeira  < from: US Kidney and Bladder (10.04.22 @ 13:17) >  Echogenic kidneys consistent with medical renal disease.  Mild fullness of the collecting systems.  Creatinine, Serum: 1.1 mg/dL (10.12.22 @ 04:30)        Weight (kg): 66.4 (10-04-22 @ 22:39)    RECOMMENDATIONS  - Cefepime Can INCREASE to 2g q12h IV given improved CrCl--to complete 7 days end 10/15, if d/c prior PO levaquin 500mg daily    If any questions, please call or send a message on [employer] Teams  Please continue to update ID with any pertinent new laboratory or radiographic findings

## 2022-10-12 NOTE — PROGRESS NOTE ADULT - TIME BILLING
I have personally seen and examined this patient.  I have reviewed all pertinent clinical information and reviewed all relevant imaging and diagnostic studies personally.   If possible, I counseled the patient about diagnostic testing and treatment plan.   I discussed my recommendations with the primary team.
I have personally seen and examined this patient.  I have reviewed all pertinent clinical information and reviewed all relevant imaging and diagnostic studies personally.   If possible, I counseled the patient about diagnostic testing and treatment plan.   I discussed my recommendations with the primary team.
As above plus discussed plan with family at bedside

## 2022-10-13 NOTE — DISCHARGE NOTE PROVIDER - NSDCMRMEDTOKEN_GEN_ALL_CORE_FT
albuterol 2.5 mg/3 mL (0.083%) inhalation solution: 1 unit(s) inhaled every 4 hours, As Needed  aspirin 81 mg oral tablet, chewable: 1 tab(s) orally once a day  atorvastatin 80 mg oral tablet: 1 tab(s) orally once a day  Atrovent 18 mcg/inh inhalation aerosol: 1 unit(s) inhaled every 6 hours  ergocalciferol 200 mcg/mL (8000 intl units/mL) oral solution: 0.25 milliliter(s) orally once a day  famotidine 10 mg oral tablet: 1 tab(s) orally 2 times a day  ferrous sulfate 220 mg/5 mL (44 mg/5 mL elemental iron) oral elixir: 7.5 milliliter(s) by gastrostomy tube once a day  folic acid 1 mg oral tablet: 1 tab(s) orally once a day  heparin 5000 units/mL injectable solution: 5000 unit(s) injectable 2 times a day  Keppra 100 mg/mL oral solution: 5 milliliter(s) by gastrostomy tube 2 times a day  midodrine 5 mg oral tablet: 1 tab(s) orally 3 times a day  Multiple Vitamins oral tablet: 1 tab(s) orally once a day via PEG  Tylenol 325 mg oral tablet: 2 tab(s) orally once a day

## 2022-10-13 NOTE — PROGRESS NOTE ADULT - PROVIDER SPECIALTY LIST ADULT
Hospitalist
Internal Medicine
Internal Medicine
Nephrology
Nephrology
Hospitalist
Hospitalist
Internal Medicine
Nutrition Support
Pulmonology
Urology
Infectious Disease
Nephrology
Pulmonology
Infectious Disease

## 2022-10-13 NOTE — PROGRESS NOTE ADULT - REASON FOR ADMISSION
MICHELLE (elevated BUN and Cr)

## 2022-10-13 NOTE — DISCHARGE NOTE PROVIDER - PROVIDER TOKENS
PROVIDER:[TOKEN:[[id number]],FOLLOWUP:[1 week]],PROVIDER:[TOKEN:[[id number]],FOLLOWUP:[2 weeks]]

## 2022-10-13 NOTE — DISCHARGE NOTE PROVIDER - NSDCFUADDINST_GEN_ALL_CORE_FT
Please follow-up with the urologist as outpatient for further management of urinary bladder mass and removal of urinary catheter

## 2022-10-13 NOTE — DISCHARGE NOTE NURSING/CASE MANAGEMENT/SOCIAL WORK - PATIENT PORTAL LINK FT
You can access the FollowMyHealth Patient Portal offered by [facility] by registering at the following website: [email or web address]. By joining [employer]’s FollowMyHealth portal, you will also be able to view your health information using other applications (apps) compatible with our system.

## 2022-10-13 NOTE — DISCHARGE NOTE PROVIDER - HOSPITAL COURSE
62YOM with PMH of ICH sp trach/PEG, HTN/HLD, CAD, hx of c.diff who was brought in by EMS from [facility] for evaluation of elevated Cr to 4.2 (baseline 0.9) and BUN to 120 (18 in 01/2022).     Pt was in MICHELLE likely 2/2 acute urinary retention and is now sp teixeira catheter with >1.3L urine output. Cr back to baseline Cr 4.2 (o/a) > 3.9 > 3.2 >1.9 > 1.1.       SIRS (Low grade temp, tachycardia, leukocytosis noted on 10/6); Not POA our Differentials: Suspected UTI (UA 58 WBC, Large LE, Urine & Blood Cxs Neg) vs Left base PNA. CT chest finding: Partially imaged left pleural effusion with left lower lobe   collapse/atelectasis. Areas of bowel wall thickening throughout the colon, most pronounced at the cecum and rectum. Findings may reflect proctocolitis in. Scrotal US done for testicular swelling, no epididimitis or torsion testis. Bcx NGTD. Was on cefepime, wbc improved, no fevers. Planned for d/c on levaquin 500mg qd untill 10/15.       2.4 cm nodular soft tissue density along the right posterior bladder wall (CT A/P Dec 2021): Per Urology repeat CT A/P with IV contrast. (since MICHELLE resolved now) = Under distended urinary bladder limits evaluation. Circumferential   bladder wall thickening with irregular posterior bladder wall masslike thickening. Repeat PSA 2.20(2.24 in 2021). As per urology they spoke to patients wife and made aware of CT findings, family does not want cystoscopy now but okay with TURB when patient stable. Plan for outpatient urology for UDS and possible cystoscopy and TURB.       Full thickness decubital ulcer, present on admission nonpurulent, no discharge. afebrile. no elevation in WBC. daily dressing continued.     Patient is stable, no fevers, and is being discharged to NH.

## 2022-10-13 NOTE — PROGRESS NOTE ADULT - SUBJECTIVE AND OBJECTIVE BOX
VIJAYSTEPHON ALICIA  [facility] T2-3A 025 [address] ([facility] T2-3A)      Patient was evaluated and examined  by bedside, no active events over night time as per covering nurse report.       REVIEW OF SYSTEMS: unable to obtain, patient is non-verbal         T(C): , Max: 37 (10-12-22 @ 20:10)  HR: 89 (10-13-22 @ 12:16)  BP: 130/67 (10-13-22 @ 12:16)  RR: 17 (10-13-22 @ 12:16)  SpO2: 100% (10-13-22 @ 12:16)  CAPILLARY BLOOD GLUCOSE      POCT Blood Glucose.: 139 mg/dL (13 Oct 2022 11:32)  POCT Blood Glucose.: 138 mg/dL (13 Oct 2022 05:10)  POCT Blood Glucose.: 120 mg/dL (13 Oct 2022 00:24)  POCT Blood Glucose.: 130 mg/dL (12 Oct 2022 17:37)      PHYSICAL EXAM:  GENERAL: NAD, patient is laying comfortably in bed  HEENT: AT, NC, PERRLA, SUPPLE, NO JVD, NO CB, trach present  LUNG: CTA B/L  CVS: normal S1, S2, RRR, NO M/G/R  ABDOMEN: soft, bowel sounds present, normoactive in all 4 quadrants, non-tender, non-distended, peg present  EXT: no E/C/C, positive PP b/l extremities  NEURO: chronic quadriplegia with extremities spasticity/contractures   SKIN: no rash, no ecchymosis        LAB  CBC  Date: 10-13-22 @ 08:43  Mean cell Dsxuwnrzwk34.6  Mean cell Hemoglobin Conc31.2  Mean cell Volum 97.9  Platelet count-Automate 402  RBC Count 2.42  Red Cell Distrib Width15.1  WBC Count9.05  % Albumin, Urine--  Hematocrit 23.7  Hemoglobin 7.4  CBC  Date: 10-12-22 @ 04:30  Mean cell Jdqipwtvvf59.2  Mean cell Hemoglobin Conc29.6  Mean cell Volum 98.8  Platelet count-Automate 343  RBC Count 2.50  Red Cell Distrib Width15.4  WBC Count8.84  % Albumin, Urine--  Hematocrit 24.7  Hemoglobin 7.3  CBC  Date: 10-11-22 @ 08:55  Mean cell Qepivoyrhg79.2  Mean cell Hemoglobin Conc31.3  Mean cell Volum 96.6  Platelet count-Automate 340  RBC Count 2.68  Red Cell Distrib Width15.9  WBC Count7.86  % Albumin, Urine--  Hematocrit 25.9  Hemoglobin 8.1  CBC  Date: 10-10-22 @ 12:04  Mean cell Ljhgujkvcz65.0  Mean cell Hemoglobin Conc32.5  Mean cell Volum 95.3  Platelet count-Automate 383  RBC Count 2.58  Red Cell Distrib Width15.9  WBC Count12.88  % Albumin, Urine--  Hematocrit 24.6  Hemoglobin 8.0  CBC  Date: 10-09-22 @ 17:12  Mean cell Yiokleqzqt87.9  Mean cell Hemoglobin Conc30.6  Mean cell Volum 97.6  Platelet count-Automate 344  RBC Count 2.54  Red Cell Distrib Width16.2  WBC Count13.12  % Albumin, Urine--  Hematocrit 24.8  Hemoglobin 7.6  CBC  Date: 10-08-22 @ 08:05  Mean cell Wivcsghifh79.6  Mean cell Hemoglobin Conc32.2  Mean cell Volum 95.0  Platelet count-Automate 386  RBC Count 2.42  Red Cell Distrib Width16.5  WBC Count11.79  % Albumin, Urine--  Hematocrit 23.0  Hemoglobin 7.4  CBC  Date: 10-07-22 @ 09:40  Mean cell Qpbkivixpf62.1  Mean cell Hemoglobin Conc31.7  Mean cell Volum 95.0  Platelet count-Automate 338  RBC Count 2.59  Red Cell Distrib Width17.2  WBC Count8.74  % Albumin, Urine--  Hematocrit 24.6  Hemoglobin 7.8    BMP  10-13-22 @ 08:43  Blood Gas Arterial-Calcium,Ionized--  Blood Urea Nitrogen, Serum 25 mg/dL<H> [10 - 20]  Carbon Dioxide, Serum28 mmol/L [17 - 32]  Chloride, Vxdjn140 mmol/L [98 - 110]  Creatinie, Serum1.1 mg/dL [0.7 - 1.5]  Glucose, Svenx705 mg/dL<H> [70 - 99]  Potassium, Serum4.5 mmol/L [3.5 - 5.0]  Sodium, Serum 142 mmol/L [135 - 146]  [facility]  10-12-22 @ 04:30  Blood Gas Arterial-Calcium,Ionized--  Blood Urea Nitrogen, Serum 25 mg/dL<H> [10 - 20]  Carbon Dioxide, Serum25 mmol/L [17 - 32]  Chloride, Gnfqg403 mmol/L [98 - 110]  Creatinie, Serum1.1 mg/dL [0.7 - 1.5]  Glucose, Ttnmp443 mg/dL<H> [70 - 99]  Potassium, Serum4.5 mmol/L [3.5 - 5.0]  Sodium, Serum 145 mmol/L [135 - 146]  [facility]  10-11-22 @ 08:55  Blood Gas Arterial-Calcium,Ionized--  Blood Urea Nitrogen, Serum 27 mg/dL<H> [10 - 20]  Carbon Dioxide, Serum26 mmol/L [17 - 32]  Chloride, Fxyqv627 mmol/L [98 - 110]  Creatinie, Serum1.1 mg/dL [0.7 - 1.5]  Glucose, Flbjx183 mg/dL<H> [70 - 99]  Potassium, Serum4.3 mmol/L [3.5 - 5.0]  Sodium, Serum 144 mmol/L [135 - 146]  [facility]  10-10-22 @ 12:04  Blood Gas Arterial-Calcium,Ionized--  Blood Urea Nitrogen, Serum 27 mg/dL<H> [10 - 20]  Carbon Dioxide, Serum24 mmol/L [17 - 32]  Chloride, Yydqb675 mmol/L [98 - 110]  Creatinie, Serum1.1 mg/dL [0.7 - 1.5]  Glucose, Nfolp187 mg/dL<H> [70 - 99]  Potassium, Serum4.0 mmol/L [3.5 - 5.0]  Sodium, Serum 139 mmol/L [135 - 146]  [facility]  10-09-22 @ 17:12  Blood Gas Arterial-Calcium,Ionized--  Blood Urea Nitrogen, Serum 21 mg/dL<H> [10 - 20]  Carbon Dioxide, Serum24 mmol/L [17 - 32]  Chloride, Gwmrc678 mmol/L [98 - 110]  Creatinie, Serum0.8 mg/dL [0.7 - 1.5]  Glucose, Qpslh104 mg/dL<H> [70 - 99]  Potassium, Serum3.8 mmol/L [3.5 - 5.0]  Sodium, Serum 141 mmol/L [135 - 146]              Microbiology:    Culture - Sputum (collected 10-12-22 @ 23:53)  Source: Trach Asp Tracheal Aspirate  Gram Stain (10-13-22 @ 11:38):    Rare polymorphonuclear leukocytes per low power field    Rare Squamous epithelial cells per low power field    Numerous Gram Positive Rods per oil power field    Numerous Gram Negative Rods per oil power field    Few Gram Negative Coccobacilli per oil power field    Culture - Blood (collected 10-10-22 @ 12:04)  Source: .Blood None  Preliminary Report (10-11-22 @ 18:02):    No growth to date.    Culture - Blood (collected 10-06-22 @ 11:34)  Source: .Blood None  Final Report (10-12-22 @ 01:01):    No Growth Final    Culture - Urine (collected 10-04-22 @ 04:32)  Source: Clean Catch Clean Catch (Midstream)  Final Report (10-05-22 @ 15:37):    <10,000 CFU/mL Normal Urogenital Susan    Culture - Blood (collected 10-04-22 @ 04:32)  Source: .Blood Blood-Peripheral  Final Report (10-09-22 @ 18:00):    No Growth Final    Culture - Blood (collected 10-04-22 @ 04:32)  Source: .Blood Blood-Peripheral  Final Report (10-09-22 @ 18:00):    No Growth Final        Medications:  acetaminophen     Tablet .. 650 milliGRAM(s) Oral every 6 hours PRN  acetaminophen     Tablet .. 650 milliGRAM(s) Oral once  ALBUTerol    0.083%. 2.5 milliGRAM(s) Nebulizer once PRN  aspirin  chewable 81 milliGRAM(s) Oral daily  atorvastatin 80 milliGRAM(s) Oral at bedtime  cefepime   IVPB 2000 milliGRAM(s) IV Intermittent every 12 hours  chlorhexidine 0.12% Liquid 15 milliLiter(s) Oral Mucosa every 12 hours  ergocalciferol Drops 8000 Unit(s) Oral daily  famotidine  Oral Tab/Cap - Peds 20 milliGRAM(s) Enteral Tube two times a day  ferrous    sulfate Liquid 300 milliGRAM(s) Oral daily  folic acid 1 milliGRAM(s) Oral daily  heparin   Injectable 5000 Unit(s) SubCutaneous every 12 hours  insulin lispro (ADMELOG) corrective regimen sliding scale   SubCutaneous three times a day before meals  ipratropium 17 MICROgram(s) HFA Inhaler 1 Puff(s) Inhalation every 6 hours  levETIRAcetam 500 milliGRAM(s) Oral two times a day  midodrine. 5 milliGRAM(s) Oral three times a day  multivitamin 1 Tablet(s) Oral daily  tamsulosin 0.4 milliGRAM(s) Oral at bedtime        Assessment and Plan:  Patient is a 61 y/o Male  with PMH of ICH sp trach/PEG, HTN/HLD, CAD, hx of c.diff who was brought in by EMS from [facility] for evaluation of elevated Cr to 4.2 (baseline 0.9) and BUN to 120 (18 in 2022). Pt was in acute urinary retention and is now sp teixeira catheter with >1.3L urine output    #MICHELLE, likely post-obstructive: RESOLVED  #Urinary retention sp teixeira catheter  - Cont monitoring BMP, and replete electrolytes for post-obstructive diuresis  - patient to be d/c to SNF with teixeira with outpatient  f/up for further outpatient work up         #SIRS (Low grade temp, tachycardia, leukocytosis noted on 10/6); Not POA  Differentials: Suspected UTI (UA 58 WBC, Large LE, Urine & Blood Cxs Neg) vs Left base PNA   CT findin. Partially imaged left pleural effusion with left lower lobe   collapse/atelectasis.  3. Areas of bowel wall thickening throughout the colon, most pronounced   at the cecum and rectum. Findings may reflect proctocolitis in   appropriate clinical setting. Trace abdominopelvic ascites.    - D/cony ceftriaxone per ID on 10/6 to watch off ABx. Procalc 0.33.  10/9: 100.8, WBC high. Started CEfepime 1gm Q12 per ID's last note.   -10/13- increased Cefepime dose to 2 grams IV every 12 hours till 10/15.   -  Trach cultures (10/12) -so far no growth        #2.4 cm nodular soft tissue density along the right posterior bladder wall (CT A/P Dec 2021):  Per Urology repeat CT A/P with IV contrast. (since MICHELLE resolved now) =  1. Under distended urinary bladder limits evaluation. Circumferential   bladder wall thickening with irregular posterior bladder wall masslike   thickening.    OUTPATIENT F/U PER UROLOGY.    repeat PSA levels 2.2  Cont. Flomax   Outpatient Urodynamic testing per uro.    #Right testis swelling:   US scrotum noted. nothing to do.    #Full thickness decubital ulcer, present on admission  -nonpurulent, no discharge  -daily wound care as per burn/wound team rec.    #hx of hypotension  -continue  midodrine 5 q8hrs at home with holding parameters      #T2DM  -continue lispro before meals and at bedtime  -monitor fingersticks     #HLD  -continue atorvastatin 80 daily     #Posterior fossa hemorrhage in  complicated by interventricular hemorrhage and hydrocephalus  Pt sp trach/peg  - Follow Pulm for vent management    DVT PPX, heparin  Code Status: DNR    #Progress Note Handoff: start d/c planning , d/c covid swab negative , continue IV abx   Family discussion: yes, medical team Disposition: SNF once bed is available    Total time spent to complete patient's bedside assessment, review medical chart, discuss medical plan of care with covering medical team was more than 35 minutes .

## 2022-10-13 NOTE — PROGRESS NOTE ADULT - SUBJECTIVE AND OBJECTIVE BOX
Patient is a 62y old  Male who presents with a chief complaint of MICHELLE (elevated BUN and Cr) (12 Oct 2022 15:13)        Over Night Events:  on MV.  Off pressors.          ROS:     All ROS are negative except HPI         PHYSICAL EXAM    ICU Vital Signs Last 24 Hrs  T(C): 36.1 (13 Oct 2022 04:43), Max: 37.2 (12 Oct 2022 12:16)  T(F): 96.9 (13 Oct 2022 04:43), Max: 98.9 (12 Oct 2022 12:16)  HR: 78 (13 Oct 2022 04:43) (72 - 81)  BP: 101/60 (13 Oct 2022 04:43) (101/60 - 118/56)  BP(mean): --  ABP: --  ABP(mean): --  RR: 18 (13 Oct 2022 04:43) (17 - 18)  SpO2: 100% (13 Oct 2022 04:43) (100% - 100%)    O2 Parameters below as of 13 Oct 2022 04:43  Patient On (Oxygen Delivery Method): ventilator            CONSTITUTIONAL:  In NAD    ENT:   Airway patent,   Mouth with normal mucosa.   Trach     EYES:   Pupils equal,   Round and reactive to light.    CARDIAC:   Normal rate,   Regular rhythm.        RESPIRATORY:   No wheezing  Bilateral BS  Normal chest expansion  Not tachypneic,  No use of accessory muscles    GASTROINTESTINAL:  Abdomen soft,   Non-tender,   No guarding,   + BS    MUSCULOSKELETAL:   Range of motion is not limited,  No clubbing, cyanosis    NEUROLOGICAL:   Does not follow commands     SKIN:   Skin normal color for race,   No evidence of rash.    PSYCHIATRIC:   No apparent risk to self or others.          10-11-22 @ 07:01  -  10-12-22 @ 07:00  --------------------------------------------------------  IN:    Enteral Tube Flush: 400 mL    Glucerna: 480 mL  Total IN: 880 mL    OUT:    Ureteral Catheter (mL): 1950 mL  Total OUT: 1950 mL    Total NET: -1070 mL      10-12-22 @ 07:01  -  10-13-22 @ 06:30  --------------------------------------------------------  IN:    Enteral Tube Flush: 200 mL    Glucerna: 360 mL  Total IN: 560 mL    OUT:    Ureteral Catheter (mL): 1600 mL  Total OUT: 1600 mL    Total NET: -1040 mL          LABS:                            7.3    8.84  )-----------( 343      ( 12 Oct 2022 04:30 )             24.7                                               10-12    145  |  108  |  25<H>  ----------------------------<  136<H>  4.5   |  25  |  1.1    Ca    8.4      12 Oct 2022 04:30  Mg     1.9     10-12    TPro  5.3<L>  /  Alb  2.2<L>  /  TBili  <0.2  /  DBili  x   /  AST  21  /  ALT  16  /  AlkPhos  82  10-12                                                                                           LIVER FUNCTIONS - ( 12 Oct 2022 04:30 )  Alb: 2.2 g/dL / Pro: 5.3 g/dL / ALK PHOS: 82 U/L / ALT: 16 U/L / AST: 21 U/L / GGT: x                                                  Culture - Blood (collected 10 Oct 2022 12:04)  Source: .Blood None  Preliminary Report (11 Oct 2022 18:02):    No growth to date.                                                   Mode: AC/ CMV (Assist Control/ Continuous Mandatory Ventilation)  RR (machine): 15  TV (machine): 400  FiO2: 40  PEEP: 5  ITime: 1  MAP: 9  PIP: 17                                          MEDICATIONS  (STANDING):  acetaminophen     Tablet .. 650 milliGRAM(s) Oral once  aspirin  chewable 81 milliGRAM(s) Oral daily  atorvastatin 80 milliGRAM(s) Oral at bedtime  cefepime   IVPB 2000 milliGRAM(s) IV Intermittent every 12 hours  chlorhexidine 0.12% Liquid 15 milliLiter(s) Oral Mucosa every 12 hours  ergocalciferol Drops 8000 Unit(s) Oral daily  famotidine  Oral Tab/Cap - Peds 20 milliGRAM(s) Enteral Tube two times a day  ferrous    sulfate Liquid 300 milliGRAM(s) Oral daily  folic acid 1 milliGRAM(s) Oral daily  heparin   Injectable 5000 Unit(s) SubCutaneous every 12 hours  insulin lispro (ADMELOG) corrective regimen sliding scale   SubCutaneous three times a day before meals  ipratropium 17 MICROgram(s) HFA Inhaler 1 Puff(s) Inhalation every 6 hours  levETIRAcetam 500 milliGRAM(s) Oral two times a day  midodrine. 5 milliGRAM(s) Oral three times a day  multivitamin 1 Tablet(s) Oral daily  tamsulosin 0.4 milliGRAM(s) Oral at bedtime    MEDICATIONS  (PRN):  acetaminophen     Tablet .. 650 milliGRAM(s) Oral every 6 hours PRN Temp greater or equal to 38C (100.4F), Mild Pain (1 - 3)  ALBUTerol    0.083%. 2.5 milliGRAM(s) Nebulizer once PRN Wheezing      New X-rays reviewed:                                                                                  ECHO

## 2022-10-13 NOTE — DISCHARGE NOTE PROVIDER - NSDCCPCAREPLAN_GEN_ALL_CORE_FT
PRINCIPAL DISCHARGE DIAGNOSIS  Diagnosis: MICHELLE (acute kidney injury)  Assessment and Plan of Treatment: You were retaining urine and that caused your creatinine levels to go up. After the retention was releaved with teixeira the kidney function came back to baseline.   You were noted to have a temporary insult to your kidney function either at the time that you arrived at the hospital or during your stay here. We have monitored your kidney function with blood work during your time here and you are at a level that no longer requires continued hospital level care, but we do recommend that you follow up to continually have your kidney function checked. You can follow up with your primary care doctor, or, if recommended in the discharge paperwork, you should follow up with a Kidney specialist called a Nephrologist.  Possible pneumonia, you were treated with cefepime in the hospital and being discharged on oral antibiotic untill 10/15.          PRINCIPAL DISCHARGE DIAGNOSIS  Diagnosis: MICHELLE (acute kidney injury)  Assessment and Plan of Treatment: You were retaining urine and that caused your creatinine levels to go up. After the retention was releaved with teixeira the kidney function came back to baseline.   You were noted to have a swelling/mass of the urinary bladder and seen by urologist here. Please follow up with urologist as outpatient in 1 week for further evaluation and management of the urinary bladder swelling/mass of the bladder and removal of the urinary catheter.  Possible pneumonia, you were treated with cefepime in the hospital and being discharged on oral antibiotic untill 10/15.

## 2022-10-13 NOTE — DISCHARGE NOTE NURSING/CASE MANAGEMENT/SOCIAL WORK - NSDCPEFALRISK_GEN_ALL_CORE
For information on Fall & Injury Prevention, visit: [email or web address] OR  [email or web address] OR  https://www.cdc.gov/steadi/patient.html

## 2022-10-13 NOTE — DISCHARGE NOTE PROVIDER - CARE PROVIDER_API CALL
Moe Pruitt (MD)  Urology  [address] [place] [address]  Phone: [phone]  Fax: [phone]  Follow Up Time: 1 week    Ag Juares (DO)  Internal Medicine  [address] [place] [address]  Phone: [phone]  Fax: [phone]  Follow Up Time: 2 weeks

## 2022-11-16 NOTE — PROGRESS NOTE ADULT - ATTENDING COMMENTS
Progress Note - [address] 61 y o  male MRN: [id number]   Unit/Bed#: [facility] 205-02 Encounter: [id number]    Behavior over the last 24 hours: unchanged  Ayanna Bailey is again seen in his room and in bed  Continues isolative, guarded, poorly motivated  Can be apprehensive on unit  Tolerating increase of Zoloft  No other occurrence of panic attack since Monday evening  Will increase Lamictal at HS for mood  Limited group attendance  Sleep: slept off and on  Appetite: fair  Medication side effects: No   ROS: all other systems are negative    Mental Status Evaluation:    Appearance:  marginal hygiene   Behavior:  cooperative   Speech:  slow, scant   Mood:  depressed   Affect:  blunted   Thought Process:  decreased rate of thoughts   Associations: concrete associations   Thought Content:  some paranoia   Perceptual Disturbances: none   Risk Potential: Suicidal ideation - None  Homicidal ideation - None  Potential for aggression - No   Sensorium:  oriented to person and place   Memory:  recent and remote memory grossly intact   Consciousness:  alert and awake   Attention: decreased concentration and decreased attention span   Insight:  limited   Judgment: limited   Gait/Station: normal gait/station, normal balance   Motor Activity: no abnormal movements     Vital signs in last 24 hours:    Temp:  [98 1 °F (36 7 °C)-98 4 °F (36 9 °C)] 98 1 °F (36 7 °C)  HR:  [56-73] 57  Resp:  [16-18] 16  BP: ()/(61-75) 118/75    Laboratory results: I have personally reviewed all pertinent laboratory/tests results        Progress Toward Goals: progressing    Assessment/Plan   Principal Problem:    Schizoaffective disorder (HCC)  Active Problems:    History of MI (myocardial infarction)    History of CVA (cerebrovascular accident)    Other hyperlipidemia    Major neurocognitive disorder, due to vascular disease, without behavioral disturbance, mild    CVA (cerebral vascular accident) ([facility] [address] )    Other chronic pain    Essential hypertension    Arthritis    Coronary artery disease involving native heart with angina pectoris, unspecified vessel or lesion type ([facility] [address] )    Recommended Treatment:     Planned medication and treatment changes: All current active medications have been reviewed  Encourage group therapy, milieu therapy and occupational therapy  Behavioral Health checks every 7 minutes    Requires continued inpatient treatment due to chronic illness and high risk of decompensation if discharged before long term stability is achieved      Increase Lamictal 50 mg HS    Current Facility-Administered Medications   Medication Dose Route Frequency Provider Last Rate   • aluminum-magnesium hydroxide-simethicone  30 mL Oral Q4H PRN Marnie Shipman MD     • amiodarone  200 mg Oral Daily Emperatriz Marti PA-C     • apixaban  5 mg Oral BID Marnie Shipman MD     • ARIPiprazole  15 mg Oral Daily [facility] Ilana Rose MD     • atorvastatin  40 mg Oral Daily With Dinner Marnie Shipman MD     • carbidopa-levodopa  1 tablet Oral 4x Daily Marnie Shipman MD     • [START ON 1/13/2023] cholecalciferol  1,000 Units Oral Daily Emperatriz Marti PA-C     • ergocalciferol  50,000 Units Oral Weekly Emperatriz Marti PA-C     • hydrOXYzine HCL  25 mg Oral Q6H PRN Max 4/day Marnie Shipman MD     • influenza vaccine  0 5 mL Intramuscular Once Walton Hodgkins, MD     • lamoTRIgine  25 mg Oral HS ERNIE Fajardo     • LORazepam  1 mg Intramuscular Q6H PRN Max 3/day Marnie Shipman MD     • melatonin  3 mg Oral HS Marnie Shipman MD     • mirtazapine  7 5 mg Oral HS Marnie Shipman MD     • nicotine  1 patch Transdermal Daily Emperatriz Marti PA-C     • nicotine polacrilex  4 mg Oral Q2H PRN Emperatriz Marti PA-C     • risperiDONE  0 25 mg Oral Q4H PRN Max 6/day Marnie Shipman MD     • risperiDONE  0 5 mg Oral Q4H PRN Max 3/day Marnie Shipman MD     • risperiDONE  1 mg Oral Q2H PRN Max 3/day Jessica VANCE Diya Lucas MD     • sertraline  100 mg Oral Daily ERNIE Yen         Risks / Benefits of Treatment:    Risks, benefits, and possible side effects of medications explained to patient and patient verbalizes understanding and agreement for treatment  Counseling / Coordination of Care:    Patient's progress discussed with staff in treatment team meeting  Medications, treatment progress and treatment plan reviewed with patient      ERNIE Yen 11/16/22 63M hx of HTN, DM p/w posterior fossa heme c/b IVH status post EVD, intubated for airway protection, 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, untreated  Case reviewed, vitals, labs, meds and imaginging  Physical exam performed   Assessment and plan formulated by myself and team

## 2022-11-22 NOTE — PROGRESS NOTE ADULT - ASSESSMENT
61 year old man with respiratory failure d/t ICH    1. ICH  -per neurosurgery  -s/p  shunt    2. Respiratory failure  - for tracheostomy, will require long term enteral nutrition  - s/p PEG, continue TF and use of abdominal binder  - banatrol added for loose stool    3. Enterobacter PNA  -s/p course of antibiotics     4. Anemia, no overt GI bleed. EGD unremarkable. Anemia of chronic disease  -hgb stable  -monitor for GI bleeding     5. Cdiff infection   -on vanco taper  -doubt active cdiff at this time    6. DVT on a/c  -apixaban 5mg resumed  -continue PPI     7. Pneumothorax  -per CTS    Attending supervision statement: I have personally seen and examined the patient. I fully participated in the care of this patient. I have made amendments to the documentation where necessary, and agree with the history, physical exam, and plan as outlined by the ACP.      [facility]  Gastroenterology and Hepatology  [address] [place], [place]  Office: [phone]  Cell: [phone]   PROCEDURE:  LUMBAR TRANSFORAMINAL EPIDURAL INJECTION     LATERALITY:  Left    LEVEL(S):  L4/5    PATIENT NAME  Christina Prince 89 year old female    SURGEON  Josue Bourne mD    ASSISTANTS  None    SURGICAL TASKS PERFORMED BY ASSISTANT  None    PRE-OP DIAGNOSIS  Lumbar radicular pain    POST-OP DIAGNOSIS  Lumbar radicular pain    ANESTHESIA TYPE  Local    DESCRIPTION: Informed written consent was obtained  for the procedure including laterality and the site of injection.    The patient was positioned prone on the fluoroscopy table and standard monitoring devices were applied to record base line vitals. Sterile prep and drape performed with chlorhexidine.  An anteroposterior fluoroscopic  film was obtained at the intended site.  The endplates were aligned with appropriate cephalo-caudal tilt.  The fluoroscopy unit was obliqued to the operative side in the coronal plane until the SAP was overlapping the pedicle.  A radiopaque marker was placed under the midpoint of the pedicle. Local anesthetic infiltration was performed to anesthetize the needle track and a 3.5” blunt-tipped needle with a 20 degrees bend 1 cm from the tip, was advanced towards the inferior aspect of the pedicle in the midpoint, orientating the fluoroscopy in a “gun barrel” direction, looking down the hub of the needle towards the tip.  Next, the fluoroscopy was oriented in lateral direction and the needle was directed so that the tip was in the cephalodorsal corner of the neural foramen.    The stylette was removed from the needle, and with careful attention to ensure no movement of the needle, clear extension tubing was attached.  Aspiration was negative.  There was no paresthesia.   Next, non-ionic contrast was injected under live fluoroscopic imaging. The spread of contrast was visualized in the foramen and in the epidural space. There was no vascular uptake or intrathecal update.  The contrast syringe was disconnected and another syringe was  connected to the extension tubing.      This syringe(s) were loaded with:    Total of 40 mg of kenalog between all syringes  0.5 ml of preservative free 2% lidocaine in each syringe  And preservative free normal saline to a total volume of 3.0 ml in each syringe    This was injected in divided doses.  The needle was flushed with preservative free normal saline.  Sterile bandage was applied to the site after the procedure.    Patient tolerated the procedure well without any side effects.  Patient was discharged from the pain clinic in stable, ambulatory condition with suitable written and verbal instructions.    Patient has been reassessed and is ready for discharge.    COMPLICATIONS  None    IMPLANTS/GRAFTS  None    SPECIMENS REMOVED  None    ESTIMATED BLOOD LOSS  2cc    FINDINGS  None    PROCEDURE: SACROILIAC JOINT INJECTION    LATERALITY: Left    PRE-OP DIAGNOSIS  Sacroiliitis     POST-OP DIAGNOSIS  Sacroiliitis     PATIENT NAME  Christina Prince 89 year old female    SURGEON  Josue Bourne MD    ASSISTANTS  None    SURGICAL TASKS PERFORMED BY ASSISTANT  None    ANESTHESIA TYPE  Local    DESCRIPTION:  The patient gave an informed and written consent after discussing risks, benefits, indications, and alternatives. The patient was placed in the prone position, monitors were applied, and vital signs were assessed.     A fluoroscopic image was obtained using the A-P view to assess anatomical landmarks and identify the sacroiliac joint. A full sterile skin prep and draping was performed. Skin and subcutaneous tissue over the sacroiliac joint was anesthetized using lidocaine 1% via a 27-gauge, 1.5 inch needle. A 22-gauge 3.5-inch Quincke-type spinal needle was advanced towards the sacroiliac joint under fluoroscopic guidance. Once the needle was seated within the joint, 0.3 mL of contrast was then incrementally and uneventfully injected after negative aspiration, demonstrating an excellent spread of contrast within the  joint. Next, 2 mL of 2% lidocaine mixed with 40 mg triamcinolone was injected. The needle was cleared and withdrawn and a sterile dressing was applied over the injection site.    The patient tolerated the procedure well with no immediate side effects or complications being noted. After an observation period, the patient was discharged home in satisfactory condition with written instructions.    Patient has been reassessed and is ready for discharge.      COMPLICATIONS  None    IMPLANTS/GRAFTS  None    SPECIMENS REMOVED  None    ESTIMATED BLOOD LOSS  2cc    FINDINGS  None    Josue Bourne MD  Interventional Pain Management  [facility]             61 year old man with respiratory failure d/t ICH    1. ICH  -per neurosurgery  -s/p  shunt    2. Respiratory failure  - for tracheostomy, will require long term enteral nutrition  - s/p PEG, continue TF and use of abdominal binder  - banatrol added for loose stool    3. Enterobacter PNA  -s/p course of antibiotics     4. Anemia, no overt GI bleed. EGD unremarkable. Anemia of chronic disease  -hgb stable  -monitor for GI bleeding     5. Cdiff infection   -rectal tube, ~100cc of loose brown stools noted   -on vanco taper  -doubt active cdiff at this time    6. DVT on a/c  -apixaban 5mg resumed  -continue PPI     7. Pneumothorax  -per CTS    Attending supervision statement: I have personally seen and examined the patient. I fully participated in the care of this patient. I have made amendments to the documentation where necessary, and agree with the history, physical exam, and plan as outlined by the ACP.      [facility]  Gastroenterology and Hepatology  [address] [place], [place]  Office: [phone]  Cell: [phone]

## 2022-12-06 NOTE — DISCHARGE NOTE NURSING/CASE MANAGEMENT/SOCIAL WORK - NSTOBACCOREFERRAL_GEN_A_NCS
LEFT Adductor canal SS      Patient reassessed immediately prior to procedure    Reason for block: at surgeon's request and post-op pain management  Performed by  CRNA/CAA: Ramonita Pierce CRNA  Assisted by: Clementine Dickson Patient declined information

## 2023-01-01 ENCOUNTER — INPATIENT (INPATIENT)
Facility: HOSPITAL | Age: 63
LOS: 9 days | DRG: 720 | End: 2023-08-05
Attending: STUDENT IN AN ORGANIZED HEALTH CARE EDUCATION/TRAINING PROGRAM | Admitting: INTERNAL MEDICINE
Payer: MEDICAID

## 2023-01-01 ENCOUNTER — TRANSCRIPTION ENCOUNTER (OUTPATIENT)
Age: 63
End: 2023-01-01

## 2023-01-01 ENCOUNTER — INPATIENT (INPATIENT)
Facility: HOSPITAL | Age: 63
LOS: 50 days | Discharge: NURSING, REHAB OR LONG-TERM CARE | DRG: 469 | End: 2023-07-20
Attending: HOSPITALIST | Admitting: INTERNAL MEDICINE
Payer: MEDICAID

## 2023-01-01 VITALS
OXYGEN SATURATION: 100 % | TEMPERATURE: 98 F | HEART RATE: 89 BPM | RESPIRATION RATE: 16 BRPM | DIASTOLIC BLOOD PRESSURE: 102 MMHG | SYSTOLIC BLOOD PRESSURE: 179 MMHG

## 2023-01-01 VITALS
RESPIRATION RATE: 20 BRPM | DIASTOLIC BLOOD PRESSURE: 55 MMHG | SYSTOLIC BLOOD PRESSURE: 96 MMHG | HEART RATE: 147 BPM | OXYGEN SATURATION: 100 %

## 2023-01-01 VITALS
DIASTOLIC BLOOD PRESSURE: 56 MMHG | TEMPERATURE: 99 F | SYSTOLIC BLOOD PRESSURE: 108 MMHG | HEART RATE: 76 BPM | OXYGEN SATURATION: 100 % | RESPIRATION RATE: 19 BRPM

## 2023-01-01 VITALS — OXYGEN SATURATION: 98 % | HEART RATE: 102 BPM

## 2023-01-01 DIAGNOSIS — R13.10 DYSPHAGIA, UNSPECIFIED: ICD-10-CM

## 2023-01-01 DIAGNOSIS — I25.10 ATHEROSCLEROTIC HEART DISEASE OF NATIVE CORONARY ARTERY WITHOUT ANGINA PECTORIS: ICD-10-CM

## 2023-01-01 DIAGNOSIS — Z74.01 BED CONFINEMENT STATUS: ICD-10-CM

## 2023-01-01 DIAGNOSIS — E16.2 HYPOGLYCEMIA, UNSPECIFIED: ICD-10-CM

## 2023-01-01 DIAGNOSIS — R57.9 SHOCK, UNSPECIFIED: ICD-10-CM

## 2023-01-01 DIAGNOSIS — I48.0 PAROXYSMAL ATRIAL FIBRILLATION: ICD-10-CM

## 2023-01-01 DIAGNOSIS — N18.6 END STAGE RENAL DISEASE: ICD-10-CM

## 2023-01-01 DIAGNOSIS — L89.302 PRESSURE ULCER OF UNSPECIFIED BUTTOCK, STAGE 2: ICD-10-CM

## 2023-01-01 DIAGNOSIS — L89.616 PRESSURE-INDUCED DEEP TISSUE DAMAGE OF RIGHT HEEL: ICD-10-CM

## 2023-01-01 DIAGNOSIS — Z95.5 PRESENCE OF CORONARY ANGIOPLASTY IMPLANT AND GRAFT: ICD-10-CM

## 2023-01-01 DIAGNOSIS — E78.5 HYPERLIPIDEMIA, UNSPECIFIED: ICD-10-CM

## 2023-01-01 DIAGNOSIS — Z79.84 LONG TERM (CURRENT) USE OF ORAL HYPOGLYCEMIC DRUGS: ICD-10-CM

## 2023-01-01 DIAGNOSIS — L89.224 PRESSURE ULCER OF LEFT HIP, STAGE 4: ICD-10-CM

## 2023-01-01 DIAGNOSIS — Z99.2 DEPENDENCE ON RENAL DIALYSIS: ICD-10-CM

## 2023-01-01 DIAGNOSIS — D62 ACUTE POSTHEMORRHAGIC ANEMIA: ICD-10-CM

## 2023-01-01 DIAGNOSIS — Z66 DO NOT RESUSCITATE: ICD-10-CM

## 2023-01-01 DIAGNOSIS — E11.10 TYPE 2 DIABETES MELLITUS WITH KETOACIDOSIS WITHOUT COMA: ICD-10-CM

## 2023-01-01 DIAGNOSIS — Z99.11 DEPENDENCE ON RESPIRATOR [VENTILATOR] STATUS: ICD-10-CM

## 2023-01-01 DIAGNOSIS — F44.4 CONVERSION DISORDER WITH MOTOR SYMPTOM OR DEFICIT: ICD-10-CM

## 2023-01-01 DIAGNOSIS — Z93.0 TRACHEOSTOMY STATUS: ICD-10-CM

## 2023-01-01 DIAGNOSIS — A41.9 SEPSIS, UNSPECIFIED ORGANISM: ICD-10-CM

## 2023-01-01 DIAGNOSIS — I50.33 ACUTE ON CHRONIC DIASTOLIC (CONGESTIVE) HEART FAILURE: ICD-10-CM

## 2023-01-01 DIAGNOSIS — I13.0 HYPERTENSIVE HEART AND CHRONIC KIDNEY DISEASE WITH HEART FAILURE AND STAGE 1 THROUGH STAGE 4 CHRONIC KIDNEY DISEASE, OR UNSPECIFIED CHRONIC KIDNEY DISEASE: ICD-10-CM

## 2023-01-01 DIAGNOSIS — E11.649 TYPE 2 DIABETES MELLITUS WITH HYPOGLYCEMIA WITHOUT COMA: ICD-10-CM

## 2023-01-01 DIAGNOSIS — K92.2 GASTROINTESTINAL HEMORRHAGE, UNSPECIFIED: ICD-10-CM

## 2023-01-01 DIAGNOSIS — N17.9 ACUTE KIDNEY FAILURE, UNSPECIFIED: ICD-10-CM

## 2023-01-01 DIAGNOSIS — N18.31 CHRONIC KIDNEY DISEASE, STAGE 3A: ICD-10-CM

## 2023-01-01 DIAGNOSIS — J96.10 CHRONIC RESPIRATORY FAILURE, UNSPECIFIED WHETHER WITH HYPOXIA OR HYPERCAPNIA: ICD-10-CM

## 2023-01-01 DIAGNOSIS — L89.213 PRESSURE ULCER OF RIGHT HIP, STAGE 3: ICD-10-CM

## 2023-01-01 DIAGNOSIS — N39.0 URINARY TRACT INFECTION, SITE NOT SPECIFIED: ICD-10-CM

## 2023-01-01 DIAGNOSIS — J96.11 CHRONIC RESPIRATORY FAILURE WITH HYPOXIA: ICD-10-CM

## 2023-01-01 DIAGNOSIS — E11.22 TYPE 2 DIABETES MELLITUS WITH DIABETIC CHRONIC KIDNEY DISEASE: ICD-10-CM

## 2023-01-01 DIAGNOSIS — E16.0 DRUG-INDUCED HYPOGLYCEMIA WITHOUT COMA: ICD-10-CM

## 2023-01-01 DIAGNOSIS — E87.5 HYPERKALEMIA: ICD-10-CM

## 2023-01-01 DIAGNOSIS — L89.626 PRESSURE-INDUCED DEEP TISSUE DAMAGE OF LEFT HEEL: ICD-10-CM

## 2023-01-01 DIAGNOSIS — D64.9 ANEMIA, UNSPECIFIED: ICD-10-CM

## 2023-01-01 DIAGNOSIS — Z51.5 ENCOUNTER FOR PALLIATIVE CARE: ICD-10-CM

## 2023-01-01 DIAGNOSIS — G82.50 QUADRIPLEGIA, UNSPECIFIED: ICD-10-CM

## 2023-01-01 DIAGNOSIS — L89.102 PRESSURE ULCER OF UNSPECIFIED PART OF BACK, STAGE 2: ICD-10-CM

## 2023-01-01 DIAGNOSIS — Z93.1 GASTROSTOMY STATUS: ICD-10-CM

## 2023-01-01 DIAGNOSIS — I48.92 UNSPECIFIED ATRIAL FLUTTER: ICD-10-CM

## 2023-01-01 DIAGNOSIS — M89.8X9 OTHER SPECIFIED DISORDERS OF BONE, UNSPECIFIED SITE: ICD-10-CM

## 2023-01-01 DIAGNOSIS — E87.6 HYPOKALEMIA: ICD-10-CM

## 2023-01-01 DIAGNOSIS — R52 PAIN, UNSPECIFIED: ICD-10-CM

## 2023-01-01 DIAGNOSIS — E87.1 HYPO-OSMOLALITY AND HYPONATREMIA: ICD-10-CM

## 2023-01-01 DIAGNOSIS — J15.1 PNEUMONIA DUE TO PSEUDOMONAS: ICD-10-CM

## 2023-01-01 DIAGNOSIS — T38.3X5A ADVERSE EFFECT OF INSULIN AND ORAL HYPOGLYCEMIC [ANTIDIABETIC] DRUGS, INITIAL ENCOUNTER: ICD-10-CM

## 2023-01-01 DIAGNOSIS — I21.A1 MYOCARDIAL INFARCTION TYPE 2: ICD-10-CM

## 2023-01-01 DIAGNOSIS — J98.11 ATELECTASIS: ICD-10-CM

## 2023-01-01 DIAGNOSIS — Z86.79 PERSONAL HISTORY OF OTHER DISEASES OF THE CIRCULATORY SYSTEM: ICD-10-CM

## 2023-01-01 DIAGNOSIS — L89.154 PRESSURE ULCER OF SACRAL REGION, STAGE 4: ICD-10-CM

## 2023-01-01 DIAGNOSIS — Z98.2 PRESENCE OF CEREBROSPINAL FLUID DRAINAGE DEVICE: ICD-10-CM

## 2023-01-01 DIAGNOSIS — B37.49 OTHER UROGENITAL CANDIDIASIS: ICD-10-CM

## 2023-01-01 DIAGNOSIS — E87.20 ACIDOSIS, UNSPECIFIED: ICD-10-CM

## 2023-01-01 DIAGNOSIS — Z88.0 ALLERGY STATUS TO PENICILLIN: ICD-10-CM

## 2023-01-01 DIAGNOSIS — I13.2 HYPERTENSIVE HEART AND CHRONIC KIDNEY DISEASE WITH HEART FAILURE AND WITH STAGE 5 CHRONIC KIDNEY DISEASE, OR END STAGE RENAL DISEASE: ICD-10-CM

## 2023-01-01 DIAGNOSIS — G93.49 OTHER ENCEPHALOPATHY: ICD-10-CM

## 2023-01-01 DIAGNOSIS — D63.1 ANEMIA IN CHRONIC KIDNEY DISEASE: ICD-10-CM

## 2023-01-01 DIAGNOSIS — R56.9 UNSPECIFIED CONVULSIONS: ICD-10-CM

## 2023-01-01 DIAGNOSIS — I50.9 HEART FAILURE, UNSPECIFIED: ICD-10-CM

## 2023-01-01 DIAGNOSIS — G40.911 EPILEPSY, UNSPECIFIED, INTRACTABLE, WITH STATUS EPILEPTICUS: ICD-10-CM

## 2023-01-01 LAB
-  AMIKACIN: SIGNIFICANT CHANGE UP
-  AMIKACIN: SIGNIFICANT CHANGE UP
-  AMPICILLIN/SULBACTAM: SIGNIFICANT CHANGE UP
-  AZTREONAM: SIGNIFICANT CHANGE UP
-  AZTREONAM: SIGNIFICANT CHANGE UP
-  CEFAZOLIN: SIGNIFICANT CHANGE UP
-  CEFEPIME: SIGNIFICANT CHANGE UP
-  CEFEPIME: SIGNIFICANT CHANGE UP
-  CEFTAZIDIME/AVIBACTAM: SIGNIFICANT CHANGE UP
-  CEFTAZIDIME/AVIBACTAM: SIGNIFICANT CHANGE UP
-  CEFTAZIDIME: SIGNIFICANT CHANGE UP
-  CEFTAZIDIME: SIGNIFICANT CHANGE UP
-  CEFTOLOZANE/TAZOBACTAM: SIGNIFICANT CHANGE UP
-  CEFTOLOZANE/TAZOBACTAM: SIGNIFICANT CHANGE UP
-  CIPROFLOXACIN: SIGNIFICANT CHANGE UP
-  CIPROFLOXACIN: SIGNIFICANT CHANGE UP
-  CLINDAMYCIN: SIGNIFICANT CHANGE UP
-  DAPTOMYCIN: SIGNIFICANT CHANGE UP
-  ERYTHROMYCIN: SIGNIFICANT CHANGE UP
-  GENTAMICIN: SIGNIFICANT CHANGE UP
-  IMIPENEM: SIGNIFICANT CHANGE UP
-  IMIPENEM: SIGNIFICANT CHANGE UP
-  LEVOFLOXACIN: SIGNIFICANT CHANGE UP
-  LEVOFLOXACIN: SIGNIFICANT CHANGE UP
-  LINEZOLID: SIGNIFICANT CHANGE UP
-  MEROPENEM: SIGNIFICANT CHANGE UP
-  MEROPENEM: SIGNIFICANT CHANGE UP
-  OXACILLIN: SIGNIFICANT CHANGE UP
-  PENICILLIN: SIGNIFICANT CHANGE UP
-  PIPERACILLIN/TAZOBACTAM: SIGNIFICANT CHANGE UP
-  PIPERACILLIN/TAZOBACTAM: SIGNIFICANT CHANGE UP
-  RIFAMPIN: SIGNIFICANT CHANGE UP
-  TETRACYCLINE: SIGNIFICANT CHANGE UP
-  TOBRAMYCIN: SIGNIFICANT CHANGE UP
-  TOBRAMYCIN: SIGNIFICANT CHANGE UP
-  TRIMETHOPRIM/SULFAMETHOXAZOLE: SIGNIFICANT CHANGE UP
-  VANCOMYCIN: SIGNIFICANT CHANGE UP
24R-OH-CALCIDIOL SERPL-MCNC: 41 NG/ML — SIGNIFICANT CHANGE UP (ref 30–80)
24R-OH-CALCIDIOL SERPL-MCNC: 42 NG/ML — SIGNIFICANT CHANGE UP (ref 30–80)
24R-OH-CALCIDIOL SERPL-MCNC: 70 NG/ML — SIGNIFICANT CHANGE UP (ref 30–80)
A1C WITH ESTIMATED AVERAGE GLUCOSE RESULT: 5.7 % — HIGH (ref 4–5.6)
A1C WITH ESTIMATED AVERAGE GLUCOSE RESULT: 6.2 % — HIGH (ref 4–5.6)
ALBUMIN SERPL ELPH-MCNC: 1.4 G/DL — LOW (ref 3.5–5.2)
ALBUMIN SERPL ELPH-MCNC: 1.5 G/DL — LOW (ref 3.5–5.2)
ALBUMIN SERPL ELPH-MCNC: 1.6 G/DL — LOW (ref 3.5–5.2)
ALBUMIN SERPL ELPH-MCNC: 1.7 G/DL — LOW (ref 3.5–5.2)
ALBUMIN SERPL ELPH-MCNC: 1.8 G/DL — LOW (ref 3.5–5.2)
ALBUMIN SERPL ELPH-MCNC: 1.9 G/DL — LOW (ref 3.5–5.2)
ALBUMIN SERPL ELPH-MCNC: 2 G/DL — LOW (ref 3.5–5.2)
ALBUMIN SERPL ELPH-MCNC: 2.1 G/DL — LOW (ref 3.5–5.2)
ALBUMIN SERPL ELPH-MCNC: 2.2 G/DL — LOW (ref 3.5–5.2)
ALBUMIN SERPL ELPH-MCNC: 2.3 G/DL — LOW (ref 3.5–5.2)
ALBUMIN SERPL ELPH-MCNC: 2.4 G/DL — LOW (ref 3.5–5.2)
ALBUMIN SERPL ELPH-MCNC: 2.5 G/DL — LOW (ref 3.5–5.2)
ALP SERPL-CCNC: 117 U/L — HIGH (ref 30–115)
ALP SERPL-CCNC: 122 U/L — HIGH (ref 30–115)
ALP SERPL-CCNC: 125 U/L — HIGH (ref 30–115)
ALP SERPL-CCNC: 130 U/L — HIGH (ref 30–115)
ALP SERPL-CCNC: 130 U/L — HIGH (ref 30–115)
ALP SERPL-CCNC: 131 U/L — HIGH (ref 30–115)
ALP SERPL-CCNC: 136 U/L — HIGH (ref 30–115)
ALP SERPL-CCNC: 138 U/L — HIGH (ref 30–115)
ALP SERPL-CCNC: 138 U/L — HIGH (ref 30–115)
ALP SERPL-CCNC: 143 U/L — HIGH (ref 30–115)
ALP SERPL-CCNC: 148 U/L — HIGH (ref 30–115)
ALP SERPL-CCNC: 151 U/L — HIGH (ref 30–115)
ALP SERPL-CCNC: 152 U/L — HIGH (ref 30–115)
ALP SERPL-CCNC: 153 U/L — HIGH (ref 30–115)
ALP SERPL-CCNC: 153 U/L — HIGH (ref 30–115)
ALP SERPL-CCNC: 157 U/L — HIGH (ref 30–115)
ALP SERPL-CCNC: 158 U/L — HIGH (ref 30–115)
ALP SERPL-CCNC: 160 U/L — HIGH (ref 30–115)
ALP SERPL-CCNC: 161 U/L — HIGH (ref 30–115)
ALP SERPL-CCNC: 162 U/L — HIGH (ref 30–115)
ALP SERPL-CCNC: 163 U/L — HIGH (ref 30–115)
ALP SERPL-CCNC: 165 U/L — HIGH (ref 30–115)
ALP SERPL-CCNC: 166 U/L — HIGH (ref 30–115)
ALP SERPL-CCNC: 168 U/L — HIGH (ref 30–115)
ALP SERPL-CCNC: 169 U/L — HIGH (ref 30–115)
ALP SERPL-CCNC: 171 U/L — HIGH (ref 30–115)
ALP SERPL-CCNC: 192 U/L — HIGH (ref 30–115)
ALP SERPL-CCNC: 197 U/L — HIGH (ref 30–115)
ALP SERPL-CCNC: 199 U/L — HIGH (ref 30–115)
ALP SERPL-CCNC: 206 U/L — HIGH (ref 30–115)
ALP SERPL-CCNC: 208 U/L — HIGH (ref 30–115)
ALP SERPL-CCNC: 209 U/L — HIGH (ref 30–115)
ALP SERPL-CCNC: 215 U/L — HIGH (ref 30–115)
ALP SERPL-CCNC: 229 U/L — HIGH (ref 30–115)
ALP SERPL-CCNC: 246 U/L — HIGH (ref 30–115)
ALP SERPL-CCNC: 251 U/L — HIGH (ref 30–115)
ALP SERPL-CCNC: 256 U/L — HIGH (ref 30–115)
ALP SERPL-CCNC: 261 U/L — HIGH (ref 30–115)
ALP SERPL-CCNC: 271 U/L — HIGH (ref 30–115)
ALP SERPL-CCNC: 273 U/L — HIGH (ref 30–115)
ALP SERPL-CCNC: 275 U/L — HIGH (ref 30–115)
ALP SERPL-CCNC: 309 U/L — HIGH (ref 30–115)
ALT FLD-CCNC: 10 U/L — SIGNIFICANT CHANGE UP (ref 0–41)
ALT FLD-CCNC: 11 U/L — SIGNIFICANT CHANGE UP (ref 0–41)
ALT FLD-CCNC: 11 U/L — SIGNIFICANT CHANGE UP (ref 0–41)
ALT FLD-CCNC: 12 U/L — SIGNIFICANT CHANGE UP (ref 0–41)
ALT FLD-CCNC: 13 U/L — SIGNIFICANT CHANGE UP (ref 0–41)
ALT FLD-CCNC: 15 U/L — SIGNIFICANT CHANGE UP (ref 0–41)
ALT FLD-CCNC: 16 U/L — SIGNIFICANT CHANGE UP (ref 0–41)
ALT FLD-CCNC: 16 U/L — SIGNIFICANT CHANGE UP (ref 0–41)
ALT FLD-CCNC: 17 U/L — SIGNIFICANT CHANGE UP (ref 0–41)
ALT FLD-CCNC: 18 U/L — SIGNIFICANT CHANGE UP (ref 0–41)
ALT FLD-CCNC: 19 U/L — SIGNIFICANT CHANGE UP (ref 0–41)
ALT FLD-CCNC: 20 U/L — SIGNIFICANT CHANGE UP (ref 0–41)
ALT FLD-CCNC: 20 U/L — SIGNIFICANT CHANGE UP (ref 0–41)
ALT FLD-CCNC: 21 U/L — SIGNIFICANT CHANGE UP (ref 0–41)
ALT FLD-CCNC: 22 U/L — SIGNIFICANT CHANGE UP (ref 0–41)
ALT FLD-CCNC: 25 U/L — SIGNIFICANT CHANGE UP (ref 0–41)
ALT FLD-CCNC: 6 U/L — SIGNIFICANT CHANGE UP (ref 0–41)
ALT FLD-CCNC: 7 U/L — SIGNIFICANT CHANGE UP (ref 0–41)
ALT FLD-CCNC: 7 U/L — SIGNIFICANT CHANGE UP (ref 0–41)
ALT FLD-CCNC: 8 U/L — SIGNIFICANT CHANGE UP (ref 0–41)
ALT FLD-CCNC: 9 U/L — SIGNIFICANT CHANGE UP (ref 0–41)
ALT FLD-CCNC: <5 U/L — SIGNIFICANT CHANGE UP (ref 0–41)
AMMONIA BLD-MCNC: 34 UMOL/L — SIGNIFICANT CHANGE UP (ref 11–55)
ANION GAP SERPL CALC-SCNC: 10 MMOL/L — SIGNIFICANT CHANGE UP (ref 7–14)
ANION GAP SERPL CALC-SCNC: 11 MMOL/L — SIGNIFICANT CHANGE UP (ref 7–14)
ANION GAP SERPL CALC-SCNC: 12 MMOL/L — SIGNIFICANT CHANGE UP (ref 7–14)
ANION GAP SERPL CALC-SCNC: 13 MMOL/L — SIGNIFICANT CHANGE UP (ref 7–14)
ANION GAP SERPL CALC-SCNC: 14 MMOL/L — SIGNIFICANT CHANGE UP (ref 7–14)
ANION GAP SERPL CALC-SCNC: 14 MMOL/L — SIGNIFICANT CHANGE UP (ref 7–14)
ANION GAP SERPL CALC-SCNC: 15 MMOL/L — HIGH (ref 7–14)
ANION GAP SERPL CALC-SCNC: 16 MMOL/L — HIGH (ref 7–14)
ANION GAP SERPL CALC-SCNC: 18 MMOL/L — HIGH (ref 7–14)
ANION GAP SERPL CALC-SCNC: 19 MMOL/L — HIGH (ref 7–14)
ANION GAP SERPL CALC-SCNC: 24 MMOL/L — HIGH (ref 7–14)
ANION GAP SERPL CALC-SCNC: 25 MMOL/L — HIGH (ref 7–14)
ANION GAP SERPL CALC-SCNC: 25 MMOL/L — HIGH (ref 7–14)
ANION GAP SERPL CALC-SCNC: 26 MMOL/L — HIGH (ref 7–14)
ANION GAP SERPL CALC-SCNC: 26 MMOL/L — HIGH (ref 7–14)
ANION GAP SERPL CALC-SCNC: 27 MMOL/L — HIGH (ref 7–14)
ANION GAP SERPL CALC-SCNC: 7 MMOL/L — SIGNIFICANT CHANGE UP (ref 7–14)
ANION GAP SERPL CALC-SCNC: 8 MMOL/L — SIGNIFICANT CHANGE UP (ref 7–14)
ANION GAP SERPL CALC-SCNC: 9 MMOL/L — SIGNIFICANT CHANGE UP (ref 7–14)
APPEARANCE UR: ABNORMAL
APTT BLD: 32.3 SEC — SIGNIFICANT CHANGE UP (ref 27–39.2)
APTT BLD: 32.7 SEC — SIGNIFICANT CHANGE UP (ref 27–39.2)
APTT BLD: 33.1 SEC — SIGNIFICANT CHANGE UP (ref 27–39.2)
APTT BLD: 34.5 SEC — SIGNIFICANT CHANGE UP (ref 27–39.2)
AST SERPL-CCNC: 10 U/L — SIGNIFICANT CHANGE UP (ref 0–41)
AST SERPL-CCNC: 12 U/L — SIGNIFICANT CHANGE UP (ref 0–41)
AST SERPL-CCNC: 14 U/L — SIGNIFICANT CHANGE UP (ref 0–41)
AST SERPL-CCNC: 14 U/L — SIGNIFICANT CHANGE UP (ref 0–41)
AST SERPL-CCNC: 15 U/L — SIGNIFICANT CHANGE UP (ref 0–41)
AST SERPL-CCNC: 15 U/L — SIGNIFICANT CHANGE UP (ref 0–41)
AST SERPL-CCNC: 16 U/L — SIGNIFICANT CHANGE UP (ref 0–41)
AST SERPL-CCNC: 17 U/L — SIGNIFICANT CHANGE UP (ref 0–41)
AST SERPL-CCNC: 19 U/L — SIGNIFICANT CHANGE UP (ref 0–41)
AST SERPL-CCNC: 20 U/L — SIGNIFICANT CHANGE UP (ref 0–41)
AST SERPL-CCNC: 22 U/L — SIGNIFICANT CHANGE UP (ref 0–41)
AST SERPL-CCNC: 22 U/L — SIGNIFICANT CHANGE UP (ref 0–41)
AST SERPL-CCNC: 23 U/L — SIGNIFICANT CHANGE UP (ref 0–41)
AST SERPL-CCNC: 24 U/L — SIGNIFICANT CHANGE UP (ref 0–41)
AST SERPL-CCNC: 25 U/L — SIGNIFICANT CHANGE UP (ref 0–41)
AST SERPL-CCNC: 25 U/L — SIGNIFICANT CHANGE UP (ref 0–41)
AST SERPL-CCNC: 26 U/L — SIGNIFICANT CHANGE UP (ref 0–41)
AST SERPL-CCNC: 27 U/L — SIGNIFICANT CHANGE UP (ref 0–41)
AST SERPL-CCNC: 28 U/L — SIGNIFICANT CHANGE UP (ref 0–41)
AST SERPL-CCNC: 29 U/L — SIGNIFICANT CHANGE UP (ref 0–41)
AST SERPL-CCNC: 30 U/L — SIGNIFICANT CHANGE UP (ref 0–41)
AST SERPL-CCNC: 31 U/L — SIGNIFICANT CHANGE UP (ref 0–41)
AST SERPL-CCNC: 33 U/L — SIGNIFICANT CHANGE UP (ref 0–41)
AST SERPL-CCNC: 34 U/L — SIGNIFICANT CHANGE UP (ref 0–41)
AST SERPL-CCNC: 35 U/L — SIGNIFICANT CHANGE UP (ref 0–41)
AST SERPL-CCNC: 40 U/L — SIGNIFICANT CHANGE UP (ref 0–41)
AST SERPL-CCNC: 45 U/L — HIGH (ref 0–41)
AST SERPL-CCNC: 48 U/L — HIGH (ref 0–41)
AST SERPL-CCNC: 58 U/L — HIGH (ref 0–41)
B-OH-BUTYR SERPL-SCNC: 0.3 MMOL/L — SIGNIFICANT CHANGE UP
B-OH-BUTYR SERPL-SCNC: 2.2 MMOL/L — HIGH
BACTERIA # UR AUTO: ABNORMAL
BACTERIA # UR AUTO: ABNORMAL
BACTERIA # UR AUTO: NEGATIVE — SIGNIFICANT CHANGE UP
BASE EXCESS BLDA CALC-SCNC: -0.1 MMOL/L — SIGNIFICANT CHANGE UP (ref -2–3)
BASE EXCESS BLDA CALC-SCNC: 0.5 MMOL/L — SIGNIFICANT CHANGE UP (ref -2–3)
BASE EXCESS BLDA CALC-SCNC: 1.7 MMOL/L — SIGNIFICANT CHANGE UP (ref -2–3)
BASE EXCESS BLDA CALC-SCNC: 2.5 MMOL/L — SIGNIFICANT CHANGE UP (ref -2–3)
BASOPHILS # BLD AUTO: 0 K/UL — SIGNIFICANT CHANGE UP (ref 0–0.2)
BASOPHILS # BLD AUTO: 0 K/UL — SIGNIFICANT CHANGE UP (ref 0–0.2)
BASOPHILS # BLD AUTO: 0.01 K/UL — SIGNIFICANT CHANGE UP (ref 0–0.2)
BASOPHILS # BLD AUTO: 0.02 K/UL — SIGNIFICANT CHANGE UP (ref 0–0.2)
BASOPHILS # BLD AUTO: 0.03 K/UL — SIGNIFICANT CHANGE UP (ref 0–0.2)
BASOPHILS # BLD AUTO: 0.04 K/UL — SIGNIFICANT CHANGE UP (ref 0–0.2)
BASOPHILS # BLD AUTO: 0.05 K/UL — SIGNIFICANT CHANGE UP (ref 0–0.2)
BASOPHILS NFR BLD AUTO: 0 % — SIGNIFICANT CHANGE UP (ref 0–1)
BASOPHILS NFR BLD AUTO: 0 % — SIGNIFICANT CHANGE UP (ref 0–1)
BASOPHILS NFR BLD AUTO: 0.1 % — SIGNIFICANT CHANGE UP (ref 0–1)
BASOPHILS NFR BLD AUTO: 0.2 % — SIGNIFICANT CHANGE UP (ref 0–1)
BASOPHILS NFR BLD AUTO: 0.3 % — SIGNIFICANT CHANGE UP (ref 0–1)
BASOPHILS NFR BLD AUTO: 0.4 % — SIGNIFICANT CHANGE UP (ref 0–1)
BASOPHILS NFR BLD AUTO: 0.5 % — SIGNIFICANT CHANGE UP (ref 0–1)
BASOPHILS NFR BLD AUTO: 0.5 % — SIGNIFICANT CHANGE UP (ref 0–1)
BILIRUB SERPL-MCNC: 0.2 MG/DL — SIGNIFICANT CHANGE UP (ref 0.2–1.2)
BILIRUB SERPL-MCNC: 0.3 MG/DL — SIGNIFICANT CHANGE UP (ref 0.2–1.2)
BILIRUB SERPL-MCNC: 0.4 MG/DL — SIGNIFICANT CHANGE UP (ref 0.2–1.2)
BILIRUB SERPL-MCNC: 0.5 MG/DL — SIGNIFICANT CHANGE UP (ref 0.2–1.2)
BILIRUB SERPL-MCNC: 0.5 MG/DL — SIGNIFICANT CHANGE UP (ref 0.2–1.2)
BILIRUB SERPL-MCNC: 1.1 MG/DL — SIGNIFICANT CHANGE UP (ref 0.2–1.2)
BILIRUB UR-MCNC: ABNORMAL
BILIRUB UR-MCNC: ABNORMAL
BILIRUB UR-MCNC: NEGATIVE — SIGNIFICANT CHANGE UP
BLANDM BLD POS QL PROBE: SIGNIFICANT CHANGE UP
BLANDM BLD POS QL PROBE: SIGNIFICANT CHANGE UP
BLD GP AB SCN SERPL QL: SIGNIFICANT CHANGE UP
BUN SERPL-MCNC: 100 MG/DL — CRITICAL HIGH (ref 10–20)
BUN SERPL-MCNC: 105 MG/DL — CRITICAL HIGH (ref 10–20)
BUN SERPL-MCNC: 107 MG/DL — CRITICAL HIGH (ref 10–20)
BUN SERPL-MCNC: 108 MG/DL — CRITICAL HIGH (ref 10–20)
BUN SERPL-MCNC: 110 MG/DL — CRITICAL HIGH (ref 10–20)
BUN SERPL-MCNC: 130 MG/DL — CRITICAL HIGH (ref 10–20)
BUN SERPL-MCNC: 138 MG/DL — CRITICAL HIGH (ref 10–20)
BUN SERPL-MCNC: 140 MG/DL — CRITICAL HIGH (ref 10–20)
BUN SERPL-MCNC: 148 MG/DL — CRITICAL HIGH (ref 10–20)
BUN SERPL-MCNC: 227 MG/DL — CRITICAL HIGH (ref 10–20)
BUN SERPL-MCNC: 230 MG/DL — CRITICAL HIGH (ref 10–20)
BUN SERPL-MCNC: 233 MG/DL — CRITICAL HIGH (ref 10–20)
BUN SERPL-MCNC: 235 MG/DL — CRITICAL HIGH (ref 10–20)
BUN SERPL-MCNC: 240 MG/DL — CRITICAL HIGH (ref 10–20)
BUN SERPL-MCNC: 241 MG/DL — CRITICAL HIGH (ref 10–20)
BUN SERPL-MCNC: 33 MG/DL — HIGH (ref 10–20)
BUN SERPL-MCNC: 36 MG/DL — HIGH (ref 10–20)
BUN SERPL-MCNC: 38 MG/DL — HIGH (ref 10–20)
BUN SERPL-MCNC: 38 MG/DL — HIGH (ref 10–20)
BUN SERPL-MCNC: 39 MG/DL — HIGH (ref 10–20)
BUN SERPL-MCNC: 40 MG/DL — HIGH (ref 10–20)
BUN SERPL-MCNC: 42 MG/DL — HIGH (ref 10–20)
BUN SERPL-MCNC: 44 MG/DL — HIGH (ref 10–20)
BUN SERPL-MCNC: 45 MG/DL — HIGH (ref 10–20)
BUN SERPL-MCNC: 46 MG/DL — HIGH (ref 10–20)
BUN SERPL-MCNC: 47 MG/DL — HIGH (ref 10–20)
BUN SERPL-MCNC: 47 MG/DL — HIGH (ref 10–20)
BUN SERPL-MCNC: 48 MG/DL — HIGH (ref 10–20)
BUN SERPL-MCNC: 51 MG/DL — HIGH (ref 10–20)
BUN SERPL-MCNC: 51 MG/DL — HIGH (ref 10–20)
BUN SERPL-MCNC: 52 MG/DL — HIGH (ref 10–20)
BUN SERPL-MCNC: 53 MG/DL — HIGH (ref 10–20)
BUN SERPL-MCNC: 55 MG/DL — HIGH (ref 10–20)
BUN SERPL-MCNC: 57 MG/DL — HIGH (ref 10–20)
BUN SERPL-MCNC: 58 MG/DL — HIGH (ref 10–20)
BUN SERPL-MCNC: 58 MG/DL — HIGH (ref 10–20)
BUN SERPL-MCNC: 59 MG/DL — HIGH (ref 10–20)
BUN SERPL-MCNC: 59 MG/DL — HIGH (ref 10–20)
BUN SERPL-MCNC: 60 MG/DL — HIGH (ref 10–20)
BUN SERPL-MCNC: 60 MG/DL — HIGH (ref 10–20)
BUN SERPL-MCNC: 61 MG/DL — CRITICAL HIGH (ref 10–20)
BUN SERPL-MCNC: 61 MG/DL — CRITICAL HIGH (ref 10–20)
BUN SERPL-MCNC: 62 MG/DL — CRITICAL HIGH (ref 10–20)
BUN SERPL-MCNC: 62 MG/DL — CRITICAL HIGH (ref 10–20)
BUN SERPL-MCNC: 64 MG/DL — CRITICAL HIGH (ref 10–20)
BUN SERPL-MCNC: 65 MG/DL — CRITICAL HIGH (ref 10–20)
BUN SERPL-MCNC: 65 MG/DL — CRITICAL HIGH (ref 10–20)
BUN SERPL-MCNC: 66 MG/DL — CRITICAL HIGH (ref 10–20)
BUN SERPL-MCNC: 67 MG/DL — CRITICAL HIGH (ref 10–20)
BUN SERPL-MCNC: 67 MG/DL — CRITICAL HIGH (ref 10–20)
BUN SERPL-MCNC: 68 MG/DL — CRITICAL HIGH (ref 10–20)
BUN SERPL-MCNC: 69 MG/DL — CRITICAL HIGH (ref 10–20)
BUN SERPL-MCNC: 71 MG/DL — CRITICAL HIGH (ref 10–20)
BUN SERPL-MCNC: 71 MG/DL — CRITICAL HIGH (ref 10–20)
BUN SERPL-MCNC: 72 MG/DL — CRITICAL HIGH (ref 10–20)
BUN SERPL-MCNC: 76 MG/DL — CRITICAL HIGH (ref 10–20)
BUN SERPL-MCNC: 82 MG/DL — CRITICAL HIGH (ref 10–20)
BUN SERPL-MCNC: 87 MG/DL — CRITICAL HIGH (ref 10–20)
BUN SERPL-MCNC: 95 MG/DL — CRITICAL HIGH (ref 10–20)
BUN SERPL-MCNC: 96 MG/DL — CRITICAL HIGH (ref 10–20)
BUN SERPL-MCNC: 97 MG/DL — CRITICAL HIGH (ref 10–20)
BUN SERPL-MCNC: 98 MG/DL — CRITICAL HIGH (ref 10–20)
C DIFF BY PCR RESULT: SIGNIFICANT CHANGE UP
C PEPTIDE SERPL-MCNC: 19.5 NG/ML — HIGH (ref 1.1–4.4)
CALCIUM SERPL-MCNC: 7.5 MG/DL — LOW (ref 8.4–10.4)
CALCIUM SERPL-MCNC: 7.5 MG/DL — LOW (ref 8.4–10.5)
CALCIUM SERPL-MCNC: 7.6 MG/DL — LOW (ref 8.4–10.5)
CALCIUM SERPL-MCNC: 7.7 MG/DL — LOW (ref 8.4–10.5)
CALCIUM SERPL-MCNC: 7.7 MG/DL — LOW (ref 8.4–10.5)
CALCIUM SERPL-MCNC: 7.8 MG/DL — LOW (ref 8.4–10.5)
CALCIUM SERPL-MCNC: 7.8 MG/DL — LOW (ref 8.4–10.5)
CALCIUM SERPL-MCNC: 8 MG/DL — LOW (ref 8.4–10.4)
CALCIUM SERPL-MCNC: 8 MG/DL — LOW (ref 8.4–10.5)
CALCIUM SERPL-MCNC: 8.1 MG/DL — LOW (ref 8.4–10.4)
CALCIUM SERPL-MCNC: 8.1 MG/DL — LOW (ref 8.4–10.4)
CALCIUM SERPL-MCNC: 8.1 MG/DL — LOW (ref 8.4–10.5)
CALCIUM SERPL-MCNC: 8.2 MG/DL — LOW (ref 8.4–10.5)
CALCIUM SERPL-MCNC: 8.2 MG/DL — LOW (ref 8.4–10.5)
CALCIUM SERPL-MCNC: 8.3 MG/DL — LOW (ref 8.4–10.4)
CALCIUM SERPL-MCNC: 8.3 MG/DL — LOW (ref 8.4–10.5)
CALCIUM SERPL-MCNC: 8.4 MG/DL — SIGNIFICANT CHANGE UP (ref 8.4–10.4)
CALCIUM SERPL-MCNC: 8.4 MG/DL — SIGNIFICANT CHANGE UP (ref 8.4–10.5)
CALCIUM SERPL-MCNC: 8.5 MG/DL — SIGNIFICANT CHANGE UP (ref 8.4–10.4)
CALCIUM SERPL-MCNC: 8.5 MG/DL — SIGNIFICANT CHANGE UP (ref 8.4–10.5)
CALCIUM SERPL-MCNC: 8.6 MG/DL — SIGNIFICANT CHANGE UP (ref 8.4–10.4)
CALCIUM SERPL-MCNC: 8.6 MG/DL — SIGNIFICANT CHANGE UP (ref 8.4–10.5)
CALCIUM SERPL-MCNC: 8.6 MG/DL — SIGNIFICANT CHANGE UP (ref 8.4–10.5)
CALCIUM SERPL-MCNC: 8.7 MG/DL — SIGNIFICANT CHANGE UP (ref 8.4–10.5)
CALCIUM SERPL-MCNC: 8.8 MG/DL — SIGNIFICANT CHANGE UP (ref 8.4–10.5)
CALCIUM SERPL-MCNC: 8.8 MG/DL — SIGNIFICANT CHANGE UP (ref 8.4–10.5)
CALCIUM SERPL-MCNC: 8.9 MG/DL — SIGNIFICANT CHANGE UP (ref 8.4–10.4)
CALCIUM SERPL-MCNC: 8.9 MG/DL — SIGNIFICANT CHANGE UP (ref 8.4–10.5)
CALCIUM SERPL-MCNC: 8.9 MG/DL — SIGNIFICANT CHANGE UP (ref 8.4–10.5)
CALCIUM SERPL-MCNC: 9 MG/DL — SIGNIFICANT CHANGE UP (ref 8.4–10.5)
CALCIUM SERPL-MCNC: 9.1 MG/DL — SIGNIFICANT CHANGE UP (ref 8.4–10.4)
CALCIUM SERPL-MCNC: 9.1 MG/DL — SIGNIFICANT CHANGE UP (ref 8.4–10.4)
CALCIUM SERPL-MCNC: 9.1 MG/DL — SIGNIFICANT CHANGE UP (ref 8.4–10.5)
CALCIUM SERPL-MCNC: 9.2 MG/DL — SIGNIFICANT CHANGE UP (ref 8.4–10.4)
CALCIUM SERPL-MCNC: 9.2 MG/DL — SIGNIFICANT CHANGE UP (ref 8.4–10.4)
CALCIUM SERPL-MCNC: 9.2 MG/DL — SIGNIFICANT CHANGE UP (ref 8.4–10.5)
CALCIUM SERPL-MCNC: 9.4 MG/DL — SIGNIFICANT CHANGE UP (ref 8.4–10.5)
CALCIUM SERPL-MCNC: 9.4 MG/DL — SIGNIFICANT CHANGE UP (ref 8.4–10.5)
CALCIUM SERPL-MCNC: 9.6 MG/DL — SIGNIFICANT CHANGE UP (ref 8.4–10.4)
CHLORIDE SERPL-SCNC: 100 MMOL/L — SIGNIFICANT CHANGE UP (ref 98–110)
CHLORIDE SERPL-SCNC: 101 MMOL/L — SIGNIFICANT CHANGE UP (ref 98–110)
CHLORIDE SERPL-SCNC: 101 MMOL/L — SIGNIFICANT CHANGE UP (ref 98–110)
CHLORIDE SERPL-SCNC: 102 MMOL/L — SIGNIFICANT CHANGE UP (ref 98–110)
CHLORIDE SERPL-SCNC: 103 MMOL/L — SIGNIFICANT CHANGE UP (ref 98–110)
CHLORIDE SERPL-SCNC: 104 MMOL/L — SIGNIFICANT CHANGE UP (ref 98–110)
CHLORIDE SERPL-SCNC: 105 MMOL/L — SIGNIFICANT CHANGE UP (ref 98–110)
CHLORIDE SERPL-SCNC: 106 MMOL/L — SIGNIFICANT CHANGE UP (ref 98–110)
CHLORIDE SERPL-SCNC: 78 MMOL/L — LOW (ref 98–110)
CHLORIDE SERPL-SCNC: 80 MMOL/L — LOW (ref 98–110)
CHLORIDE SERPL-SCNC: 80 MMOL/L — LOW (ref 98–110)
CHLORIDE SERPL-SCNC: 81 MMOL/L — LOW (ref 98–110)
CHLORIDE SERPL-SCNC: 81 MMOL/L — LOW (ref 98–110)
CHLORIDE SERPL-SCNC: 82 MMOL/L — LOW (ref 98–110)
CHLORIDE SERPL-SCNC: 89 MMOL/L — LOW (ref 98–110)
CHLORIDE SERPL-SCNC: 89 MMOL/L — LOW (ref 98–110)
CHLORIDE SERPL-SCNC: 90 MMOL/L — LOW (ref 98–110)
CHLORIDE SERPL-SCNC: 91 MMOL/L — LOW (ref 98–110)
CHLORIDE SERPL-SCNC: 92 MMOL/L — LOW (ref 98–110)
CHLORIDE SERPL-SCNC: 93 MMOL/L — LOW (ref 98–110)
CHLORIDE SERPL-SCNC: 93 MMOL/L — LOW (ref 98–110)
CHLORIDE SERPL-SCNC: 94 MMOL/L — LOW (ref 98–110)
CHLORIDE SERPL-SCNC: 95 MMOL/L — LOW (ref 98–110)
CHLORIDE SERPL-SCNC: 96 MMOL/L — LOW (ref 98–110)
CHLORIDE SERPL-SCNC: 97 MMOL/L — LOW (ref 98–110)
CHLORIDE SERPL-SCNC: 98 MMOL/L — SIGNIFICANT CHANGE UP (ref 98–110)
CHLORIDE SERPL-SCNC: 99 MMOL/L — SIGNIFICANT CHANGE UP (ref 98–110)
CHOLEST SERPL-MCNC: 72 MG/DL — SIGNIFICANT CHANGE UP
CK SERPL-CCNC: 115 U/L — SIGNIFICANT CHANGE UP (ref 0–225)
CO2 SERPL-SCNC: 11 MMOL/L — LOW (ref 17–32)
CO2 SERPL-SCNC: 12 MMOL/L — LOW (ref 17–32)
CO2 SERPL-SCNC: 13 MMOL/L — LOW (ref 17–32)
CO2 SERPL-SCNC: 14 MMOL/L — LOW (ref 17–32)
CO2 SERPL-SCNC: 14 MMOL/L — LOW (ref 17–32)
CO2 SERPL-SCNC: 16 MMOL/L — LOW (ref 17–32)
CO2 SERPL-SCNC: 19 MMOL/L — SIGNIFICANT CHANGE UP (ref 17–32)
CO2 SERPL-SCNC: 19 MMOL/L — SIGNIFICANT CHANGE UP (ref 17–32)
CO2 SERPL-SCNC: 20 MMOL/L — SIGNIFICANT CHANGE UP (ref 17–32)
CO2 SERPL-SCNC: 21 MMOL/L — SIGNIFICANT CHANGE UP (ref 17–32)
CO2 SERPL-SCNC: 21 MMOL/L — SIGNIFICANT CHANGE UP (ref 17–32)
CO2 SERPL-SCNC: 22 MMOL/L — SIGNIFICANT CHANGE UP (ref 17–32)
CO2 SERPL-SCNC: 23 MMOL/L — SIGNIFICANT CHANGE UP (ref 17–32)
CO2 SERPL-SCNC: 24 MMOL/L — SIGNIFICANT CHANGE UP (ref 17–32)
CO2 SERPL-SCNC: 25 MMOL/L — SIGNIFICANT CHANGE UP (ref 17–32)
CO2 SERPL-SCNC: 26 MMOL/L — SIGNIFICANT CHANGE UP (ref 17–32)
CO2 SERPL-SCNC: 27 MMOL/L — SIGNIFICANT CHANGE UP (ref 17–32)
CO2 SERPL-SCNC: 28 MMOL/L — SIGNIFICANT CHANGE UP (ref 17–32)
CO2 SERPL-SCNC: 29 MMOL/L — SIGNIFICANT CHANGE UP (ref 17–32)
CO2 SERPL-SCNC: 30 MMOL/L — SIGNIFICANT CHANGE UP (ref 17–32)
CO2 SERPL-SCNC: 31 MMOL/L — SIGNIFICANT CHANGE UP (ref 17–32)
COLOR SPEC: ABNORMAL
COLOR SPEC: YELLOW — SIGNIFICANT CHANGE UP
COLOR SPEC: YELLOW — SIGNIFICANT CHANGE UP
CORTICOSTEROID BINDING GLOBULIN RESULT: 1.5 MG/DL — LOW
CORTIS AM PEAK SERPL-MCNC: 12.3 UG/DL — SIGNIFICANT CHANGE UP (ref 6–18.4)
CORTIS AM PEAK SERPL-MCNC: 9.6 UG/DL — SIGNIFICANT CHANGE UP (ref 6–18.4)
CORTIS F/TOTAL MFR SERPL: 28 % — SIGNIFICANT CHANGE UP
CORTIS SERPL-MCNC: 13 UG/DL — SIGNIFICANT CHANGE UP
CORTISOL, FREE RESULT: 3.6 UG/DL — HIGH
CREAT ?TM UR-MCNC: 10 MG/DL — SIGNIFICANT CHANGE UP
CREAT SERPL-MCNC: 1.5 MG/DL — SIGNIFICANT CHANGE UP (ref 0.7–1.5)
CREAT SERPL-MCNC: 1.7 MG/DL — HIGH (ref 0.7–1.5)
CREAT SERPL-MCNC: 1.8 MG/DL — HIGH (ref 0.7–1.5)
CREAT SERPL-MCNC: 1.9 MG/DL — HIGH (ref 0.7–1.5)
CREAT SERPL-MCNC: 2 MG/DL — HIGH (ref 0.7–1.5)
CREAT SERPL-MCNC: 2.1 MG/DL — HIGH (ref 0.7–1.5)
CREAT SERPL-MCNC: 2.3 MG/DL — HIGH (ref 0.7–1.5)
CREAT SERPL-MCNC: 2.4 MG/DL — HIGH (ref 0.7–1.5)
CREAT SERPL-MCNC: 2.5 MG/DL — HIGH (ref 0.7–1.5)
CREAT SERPL-MCNC: 2.6 MG/DL — HIGH (ref 0.7–1.5)
CREAT SERPL-MCNC: 2.7 MG/DL — HIGH (ref 0.7–1.5)
CREAT SERPL-MCNC: 2.8 MG/DL — HIGH (ref 0.7–1.5)
CREAT SERPL-MCNC: 2.9 MG/DL — HIGH (ref 0.7–1.5)
CREAT SERPL-MCNC: 3 MG/DL — HIGH (ref 0.7–1.5)
CREAT SERPL-MCNC: 3.1 MG/DL — HIGH (ref 0.7–1.5)
CREAT SERPL-MCNC: 3.1 MG/DL — HIGH (ref 0.7–1.5)
CREAT SERPL-MCNC: 3.3 MG/DL — HIGH (ref 0.7–1.5)
CREAT SERPL-MCNC: 3.4 MG/DL — HIGH (ref 0.7–1.5)
CREAT SERPL-MCNC: 3.5 MG/DL — HIGH (ref 0.7–1.5)
CREAT SERPL-MCNC: 3.6 MG/DL — HIGH (ref 0.7–1.5)
CREAT SERPL-MCNC: 3.7 MG/DL — HIGH (ref 0.7–1.5)
CREAT SERPL-MCNC: 4.1 MG/DL — CRITICAL HIGH (ref 0.7–1.5)
CREAT SERPL-MCNC: 4.4 MG/DL — CRITICAL HIGH (ref 0.7–1.5)
CREAT SERPL-MCNC: 4.5 MG/DL — CRITICAL HIGH (ref 0.7–1.5)
CREAT SERPL-MCNC: 4.6 MG/DL — CRITICAL HIGH (ref 0.7–1.5)
CRP SERPL-MCNC: 47 MG/L — HIGH
CULTURE RESULTS: SIGNIFICANT CHANGE UP
DIFF PNL FLD: ABNORMAL
DIFF PNL FLD: ABNORMAL
DIFF PNL FLD: NEGATIVE — SIGNIFICANT CHANGE UP
EGFR: 13 ML/MIN/1.73M2 — LOW
EGFR: 14 ML/MIN/1.73M2 — LOW
EGFR: 14 ML/MIN/1.73M2 — LOW
EGFR: 15 ML/MIN/1.73M2 — LOW
EGFR: 17 ML/MIN/1.73M2 — LOW
EGFR: 18 ML/MIN/1.73M2 — LOW
EGFR: 19 ML/MIN/1.73M2 — LOW
EGFR: 19 ML/MIN/1.73M2 — LOW
EGFR: 20 ML/MIN/1.73M2 — LOW
EGFR: 22 ML/MIN/1.73M2 — LOW
EGFR: 23 ML/MIN/1.73M2 — LOW
EGFR: 24 ML/MIN/1.73M2 — LOW
EGFR: 26 ML/MIN/1.73M2 — LOW
EGFR: 27 ML/MIN/1.73M2 — LOW
EGFR: 28 ML/MIN/1.73M2 — LOW
EGFR: 29 ML/MIN/1.73M2 — LOW
EGFR: 31 ML/MIN/1.73M2 — LOW
EGFR: 34 ML/MIN/1.73M2 — LOW
EGFR: 37 ML/MIN/1.73M2 — LOW
EGFR: 39 ML/MIN/1.73M2 — LOW
EGFR: 42 ML/MIN/1.73M2 — LOW
EGFR: 44 ML/MIN/1.73M2 — LOW
EGFR: 52 ML/MIN/1.73M2 — LOW
EOSINOPHIL # BLD AUTO: 0 K/UL — SIGNIFICANT CHANGE UP (ref 0–0.7)
EOSINOPHIL # BLD AUTO: 0.01 K/UL — SIGNIFICANT CHANGE UP (ref 0–0.7)
EOSINOPHIL # BLD AUTO: 0.04 K/UL — SIGNIFICANT CHANGE UP (ref 0–0.7)
EOSINOPHIL # BLD AUTO: 0.08 K/UL — SIGNIFICANT CHANGE UP (ref 0–0.7)
EOSINOPHIL # BLD AUTO: 0.12 K/UL — SIGNIFICANT CHANGE UP (ref 0–0.7)
EOSINOPHIL # BLD AUTO: 0.18 K/UL — SIGNIFICANT CHANGE UP (ref 0–0.7)
EOSINOPHIL # BLD AUTO: 0.19 K/UL — SIGNIFICANT CHANGE UP (ref 0–0.7)
EOSINOPHIL # BLD AUTO: 0.19 K/UL — SIGNIFICANT CHANGE UP (ref 0–0.7)
EOSINOPHIL # BLD AUTO: 0.2 K/UL — SIGNIFICANT CHANGE UP (ref 0–0.7)
EOSINOPHIL # BLD AUTO: 0.2 K/UL — SIGNIFICANT CHANGE UP (ref 0–0.7)
EOSINOPHIL # BLD AUTO: 0.21 K/UL — SIGNIFICANT CHANGE UP (ref 0–0.7)
EOSINOPHIL # BLD AUTO: 0.21 K/UL — SIGNIFICANT CHANGE UP (ref 0–0.7)
EOSINOPHIL # BLD AUTO: 0.23 K/UL — SIGNIFICANT CHANGE UP (ref 0–0.7)
EOSINOPHIL # BLD AUTO: 0.24 K/UL — SIGNIFICANT CHANGE UP (ref 0–0.7)
EOSINOPHIL # BLD AUTO: 0.25 K/UL — SIGNIFICANT CHANGE UP (ref 0–0.7)
EOSINOPHIL # BLD AUTO: 0.25 K/UL — SIGNIFICANT CHANGE UP (ref 0–0.7)
EOSINOPHIL # BLD AUTO: 0.27 K/UL — SIGNIFICANT CHANGE UP (ref 0–0.7)
EOSINOPHIL # BLD AUTO: 0.27 K/UL — SIGNIFICANT CHANGE UP (ref 0–0.7)
EOSINOPHIL # BLD AUTO: 0.3 K/UL — SIGNIFICANT CHANGE UP (ref 0–0.7)
EOSINOPHIL # BLD AUTO: 0.3 K/UL — SIGNIFICANT CHANGE UP (ref 0–0.7)
EOSINOPHIL # BLD AUTO: 0.32 K/UL — SIGNIFICANT CHANGE UP (ref 0–0.7)
EOSINOPHIL # BLD AUTO: 0.33 K/UL — SIGNIFICANT CHANGE UP (ref 0–0.7)
EOSINOPHIL # BLD AUTO: 0.35 K/UL — SIGNIFICANT CHANGE UP (ref 0–0.7)
EOSINOPHIL # BLD AUTO: 0.56 K/UL — SIGNIFICANT CHANGE UP (ref 0–0.7)
EOSINOPHIL # BLD AUTO: 1.13 K/UL — HIGH (ref 0–0.7)
EOSINOPHIL # BLD AUTO: 1.17 K/UL — HIGH (ref 0–0.7)
EOSINOPHIL # BLD AUTO: 1.27 K/UL — HIGH (ref 0–0.7)
EOSINOPHIL # BLD AUTO: 1.3 K/UL — HIGH (ref 0–0.7)
EOSINOPHIL # BLD AUTO: 1.53 K/UL — HIGH (ref 0–0.7)
EOSINOPHIL # BLD AUTO: 1.66 K/UL — HIGH (ref 0–0.7)
EOSINOPHIL NFR BLD AUTO: 0 % — SIGNIFICANT CHANGE UP (ref 0–8)
EOSINOPHIL NFR BLD AUTO: 0.1 % — SIGNIFICANT CHANGE UP (ref 0–8)
EOSINOPHIL NFR BLD AUTO: 0.3 % — SIGNIFICANT CHANGE UP (ref 0–8)
EOSINOPHIL NFR BLD AUTO: 0.7 % — SIGNIFICANT CHANGE UP (ref 0–8)
EOSINOPHIL NFR BLD AUTO: 1.2 % — SIGNIFICANT CHANGE UP (ref 0–8)
EOSINOPHIL NFR BLD AUTO: 1.6 % — SIGNIFICANT CHANGE UP (ref 0–8)
EOSINOPHIL NFR BLD AUTO: 1.6 % — SIGNIFICANT CHANGE UP (ref 0–8)
EOSINOPHIL NFR BLD AUTO: 1.8 % — SIGNIFICANT CHANGE UP (ref 0–8)
EOSINOPHIL NFR BLD AUTO: 1.8 % — SIGNIFICANT CHANGE UP (ref 0–8)
EOSINOPHIL NFR BLD AUTO: 11.6 % — HIGH (ref 0–8)
EOSINOPHIL NFR BLD AUTO: 2 % — SIGNIFICANT CHANGE UP (ref 0–8)
EOSINOPHIL NFR BLD AUTO: 2 % — SIGNIFICANT CHANGE UP (ref 0–8)
EOSINOPHIL NFR BLD AUTO: 2.2 % — SIGNIFICANT CHANGE UP (ref 0–8)
EOSINOPHIL NFR BLD AUTO: 2.3 % — SIGNIFICANT CHANGE UP (ref 0–8)
EOSINOPHIL NFR BLD AUTO: 2.3 % — SIGNIFICANT CHANGE UP (ref 0–8)
EOSINOPHIL NFR BLD AUTO: 2.4 % — SIGNIFICANT CHANGE UP (ref 0–8)
EOSINOPHIL NFR BLD AUTO: 2.5 % — SIGNIFICANT CHANGE UP (ref 0–8)
EOSINOPHIL NFR BLD AUTO: 2.6 % — SIGNIFICANT CHANGE UP (ref 0–8)
EOSINOPHIL NFR BLD AUTO: 2.7 % — SIGNIFICANT CHANGE UP (ref 0–8)
EOSINOPHIL NFR BLD AUTO: 3 % — SIGNIFICANT CHANGE UP (ref 0–8)
EOSINOPHIL NFR BLD AUTO: 3 % — SIGNIFICANT CHANGE UP (ref 0–8)
EOSINOPHIL NFR BLD AUTO: 3.2 % — SIGNIFICANT CHANGE UP (ref 0–8)
EOSINOPHIL NFR BLD AUTO: 3.3 % — SIGNIFICANT CHANGE UP (ref 0–8)
EOSINOPHIL NFR BLD AUTO: 3.5 % — SIGNIFICANT CHANGE UP (ref 0–8)
EOSINOPHIL NFR BLD AUTO: 3.8 % — SIGNIFICANT CHANGE UP (ref 0–8)
EOSINOPHIL NFR BLD AUTO: 7.5 % — SIGNIFICANT CHANGE UP (ref 0–8)
EOSINOPHIL NFR BLD AUTO: 8.5 % — HIGH (ref 0–8)
EOSINOPHIL NFR BLD AUTO: 9.3 % — HIGH (ref 0–8)
EOSINOPHIL NFR BLD AUTO: 9.5 % — HIGH (ref 0–8)
EOSINOPHIL NFR BLD AUTO: 9.8 % — HIGH (ref 0–8)
EPI CELLS # UR: 0 /HPF — SIGNIFICANT CHANGE UP (ref 0–5)
EPI CELLS # UR: 1 /HPF — SIGNIFICANT CHANGE UP (ref 0–5)
EPI CELLS # UR: 2 /HPF — SIGNIFICANT CHANGE UP (ref 0–5)
EPO SERPL-MCNC: 6.9 MIU/ML — SIGNIFICANT CHANGE UP (ref 2.6–18.5)
ESTIMATED AVERAGE GLUCOSE: 117 MG/DL — HIGH (ref 68–114)
ESTIMATED AVERAGE GLUCOSE: 131 MG/DL — HIGH (ref 68–114)
FERRITIN SERPL-MCNC: 1199 NG/ML — HIGH (ref 30–400)
FERRITIN SERPL-MCNC: 1956 NG/ML — HIGH (ref 30–400)
FOLATE SERPL-MCNC: >20 NG/ML — SIGNIFICANT CHANGE UP
GAS PNL BLDA: SIGNIFICANT CHANGE UP
GGT SERPL-CCNC: 86 U/L — HIGH (ref 1–40)
GLUCOSE BLDC GLUCOMTR-MCNC: 100 MG/DL — HIGH (ref 70–99)
GLUCOSE BLDC GLUCOMTR-MCNC: 101 MG/DL — HIGH (ref 70–99)
GLUCOSE BLDC GLUCOMTR-MCNC: 101 MG/DL — HIGH (ref 70–99)
GLUCOSE BLDC GLUCOMTR-MCNC: 102 MG/DL — HIGH (ref 70–99)
GLUCOSE BLDC GLUCOMTR-MCNC: 103 MG/DL — HIGH (ref 70–99)
GLUCOSE BLDC GLUCOMTR-MCNC: 103 MG/DL — HIGH (ref 70–99)
GLUCOSE BLDC GLUCOMTR-MCNC: 104 MG/DL — HIGH (ref 70–99)
GLUCOSE BLDC GLUCOMTR-MCNC: 104 MG/DL — HIGH (ref 70–99)
GLUCOSE BLDC GLUCOMTR-MCNC: 105 MG/DL — HIGH (ref 70–99)
GLUCOSE BLDC GLUCOMTR-MCNC: 106 MG/DL — HIGH (ref 70–99)
GLUCOSE BLDC GLUCOMTR-MCNC: 106 MG/DL — HIGH (ref 70–99)
GLUCOSE BLDC GLUCOMTR-MCNC: 108 MG/DL — HIGH (ref 70–99)
GLUCOSE BLDC GLUCOMTR-MCNC: 109 MG/DL — HIGH (ref 70–99)
GLUCOSE BLDC GLUCOMTR-MCNC: 109 MG/DL — HIGH (ref 70–99)
GLUCOSE BLDC GLUCOMTR-MCNC: 110 MG/DL — HIGH (ref 70–99)
GLUCOSE BLDC GLUCOMTR-MCNC: 111 MG/DL — HIGH (ref 70–99)
GLUCOSE BLDC GLUCOMTR-MCNC: 112 MG/DL — HIGH (ref 70–99)
GLUCOSE BLDC GLUCOMTR-MCNC: 114 MG/DL — HIGH (ref 70–99)
GLUCOSE BLDC GLUCOMTR-MCNC: 115 MG/DL — HIGH (ref 70–99)
GLUCOSE BLDC GLUCOMTR-MCNC: 116 MG/DL — HIGH (ref 70–99)
GLUCOSE BLDC GLUCOMTR-MCNC: 117 MG/DL — HIGH (ref 70–99)
GLUCOSE BLDC GLUCOMTR-MCNC: 119 MG/DL — HIGH (ref 70–99)
GLUCOSE BLDC GLUCOMTR-MCNC: 120 MG/DL — HIGH (ref 70–99)
GLUCOSE BLDC GLUCOMTR-MCNC: 120 MG/DL — HIGH (ref 70–99)
GLUCOSE BLDC GLUCOMTR-MCNC: 121 MG/DL — HIGH (ref 70–99)
GLUCOSE BLDC GLUCOMTR-MCNC: 121 MG/DL — HIGH (ref 70–99)
GLUCOSE BLDC GLUCOMTR-MCNC: 122 MG/DL — HIGH (ref 70–99)
GLUCOSE BLDC GLUCOMTR-MCNC: 123 MG/DL — HIGH (ref 70–99)
GLUCOSE BLDC GLUCOMTR-MCNC: 124 MG/DL — HIGH (ref 70–99)
GLUCOSE BLDC GLUCOMTR-MCNC: 124 MG/DL — HIGH (ref 70–99)
GLUCOSE BLDC GLUCOMTR-MCNC: 125 MG/DL — HIGH (ref 70–99)
GLUCOSE BLDC GLUCOMTR-MCNC: 125 MG/DL — HIGH (ref 70–99)
GLUCOSE BLDC GLUCOMTR-MCNC: 126 MG/DL — HIGH (ref 70–99)
GLUCOSE BLDC GLUCOMTR-MCNC: 127 MG/DL — HIGH (ref 70–99)
GLUCOSE BLDC GLUCOMTR-MCNC: 128 MG/DL — HIGH (ref 70–99)
GLUCOSE BLDC GLUCOMTR-MCNC: 129 MG/DL — HIGH (ref 70–99)
GLUCOSE BLDC GLUCOMTR-MCNC: 130 MG/DL — HIGH (ref 70–99)
GLUCOSE BLDC GLUCOMTR-MCNC: 131 MG/DL — HIGH (ref 70–99)
GLUCOSE BLDC GLUCOMTR-MCNC: 132 MG/DL — HIGH (ref 70–99)
GLUCOSE BLDC GLUCOMTR-MCNC: 133 MG/DL — HIGH (ref 70–99)
GLUCOSE BLDC GLUCOMTR-MCNC: 134 MG/DL — HIGH (ref 70–99)
GLUCOSE BLDC GLUCOMTR-MCNC: 134 MG/DL — HIGH (ref 70–99)
GLUCOSE BLDC GLUCOMTR-MCNC: 135 MG/DL — HIGH (ref 70–99)
GLUCOSE BLDC GLUCOMTR-MCNC: 136 MG/DL — HIGH (ref 70–99)
GLUCOSE BLDC GLUCOMTR-MCNC: 137 MG/DL — HIGH (ref 70–99)
GLUCOSE BLDC GLUCOMTR-MCNC: 138 MG/DL — HIGH (ref 70–99)
GLUCOSE BLDC GLUCOMTR-MCNC: 139 MG/DL — HIGH (ref 70–99)
GLUCOSE BLDC GLUCOMTR-MCNC: 140 MG/DL — HIGH (ref 70–99)
GLUCOSE BLDC GLUCOMTR-MCNC: 141 MG/DL — HIGH (ref 70–99)
GLUCOSE BLDC GLUCOMTR-MCNC: 141 MG/DL — HIGH (ref 70–99)
GLUCOSE BLDC GLUCOMTR-MCNC: 142 MG/DL — HIGH (ref 70–99)
GLUCOSE BLDC GLUCOMTR-MCNC: 143 MG/DL — HIGH (ref 70–99)
GLUCOSE BLDC GLUCOMTR-MCNC: 144 MG/DL — HIGH (ref 70–99)
GLUCOSE BLDC GLUCOMTR-MCNC: 145 MG/DL — HIGH (ref 70–99)
GLUCOSE BLDC GLUCOMTR-MCNC: 146 MG/DL — HIGH (ref 70–99)
GLUCOSE BLDC GLUCOMTR-MCNC: 147 MG/DL — HIGH (ref 70–99)
GLUCOSE BLDC GLUCOMTR-MCNC: 147 MG/DL — HIGH (ref 70–99)
GLUCOSE BLDC GLUCOMTR-MCNC: 148 MG/DL — HIGH (ref 70–99)
GLUCOSE BLDC GLUCOMTR-MCNC: 149 MG/DL — HIGH (ref 70–99)
GLUCOSE BLDC GLUCOMTR-MCNC: 150 MG/DL — HIGH (ref 70–99)
GLUCOSE BLDC GLUCOMTR-MCNC: 151 MG/DL — HIGH (ref 70–99)
GLUCOSE BLDC GLUCOMTR-MCNC: 152 MG/DL — HIGH (ref 70–99)
GLUCOSE BLDC GLUCOMTR-MCNC: 153 MG/DL — HIGH (ref 70–99)
GLUCOSE BLDC GLUCOMTR-MCNC: 153 MG/DL — HIGH (ref 70–99)
GLUCOSE BLDC GLUCOMTR-MCNC: 155 MG/DL — HIGH (ref 70–99)
GLUCOSE BLDC GLUCOMTR-MCNC: 155 MG/DL — HIGH (ref 70–99)
GLUCOSE BLDC GLUCOMTR-MCNC: 156 MG/DL — HIGH (ref 70–99)
GLUCOSE BLDC GLUCOMTR-MCNC: 157 MG/DL — HIGH (ref 70–99)
GLUCOSE BLDC GLUCOMTR-MCNC: 158 MG/DL — HIGH (ref 70–99)
GLUCOSE BLDC GLUCOMTR-MCNC: 159 MG/DL — HIGH (ref 70–99)
GLUCOSE BLDC GLUCOMTR-MCNC: 160 MG/DL — HIGH (ref 70–99)
GLUCOSE BLDC GLUCOMTR-MCNC: 160 MG/DL — HIGH (ref 70–99)
GLUCOSE BLDC GLUCOMTR-MCNC: 161 MG/DL — HIGH (ref 70–99)
GLUCOSE BLDC GLUCOMTR-MCNC: 162 MG/DL — HIGH (ref 70–99)
GLUCOSE BLDC GLUCOMTR-MCNC: 162 MG/DL — HIGH (ref 70–99)
GLUCOSE BLDC GLUCOMTR-MCNC: 163 MG/DL — HIGH (ref 70–99)
GLUCOSE BLDC GLUCOMTR-MCNC: 164 MG/DL — HIGH (ref 70–99)
GLUCOSE BLDC GLUCOMTR-MCNC: 165 MG/DL — HIGH (ref 70–99)
GLUCOSE BLDC GLUCOMTR-MCNC: 166 MG/DL — HIGH (ref 70–99)
GLUCOSE BLDC GLUCOMTR-MCNC: 166 MG/DL — HIGH (ref 70–99)
GLUCOSE BLDC GLUCOMTR-MCNC: 167 MG/DL — HIGH (ref 70–99)
GLUCOSE BLDC GLUCOMTR-MCNC: 168 MG/DL — HIGH (ref 70–99)
GLUCOSE BLDC GLUCOMTR-MCNC: 169 MG/DL — HIGH (ref 70–99)
GLUCOSE BLDC GLUCOMTR-MCNC: 170 MG/DL — HIGH (ref 70–99)
GLUCOSE BLDC GLUCOMTR-MCNC: 171 MG/DL — HIGH (ref 70–99)
GLUCOSE BLDC GLUCOMTR-MCNC: 172 MG/DL — HIGH (ref 70–99)
GLUCOSE BLDC GLUCOMTR-MCNC: 172 MG/DL — HIGH (ref 70–99)
GLUCOSE BLDC GLUCOMTR-MCNC: 173 MG/DL — HIGH (ref 70–99)
GLUCOSE BLDC GLUCOMTR-MCNC: 173 MG/DL — HIGH (ref 70–99)
GLUCOSE BLDC GLUCOMTR-MCNC: 175 MG/DL — HIGH (ref 70–99)
GLUCOSE BLDC GLUCOMTR-MCNC: 176 MG/DL — HIGH (ref 70–99)
GLUCOSE BLDC GLUCOMTR-MCNC: 176 MG/DL — HIGH (ref 70–99)
GLUCOSE BLDC GLUCOMTR-MCNC: 178 MG/DL — HIGH (ref 70–99)
GLUCOSE BLDC GLUCOMTR-MCNC: 179 MG/DL — HIGH (ref 70–99)
GLUCOSE BLDC GLUCOMTR-MCNC: 180 MG/DL — HIGH (ref 70–99)
GLUCOSE BLDC GLUCOMTR-MCNC: 181 MG/DL — HIGH (ref 70–99)
GLUCOSE BLDC GLUCOMTR-MCNC: 183 MG/DL — HIGH (ref 70–99)
GLUCOSE BLDC GLUCOMTR-MCNC: 184 MG/DL — HIGH (ref 70–99)
GLUCOSE BLDC GLUCOMTR-MCNC: 184 MG/DL — HIGH (ref 70–99)
GLUCOSE BLDC GLUCOMTR-MCNC: 185 MG/DL — HIGH (ref 70–99)
GLUCOSE BLDC GLUCOMTR-MCNC: 185 MG/DL — HIGH (ref 70–99)
GLUCOSE BLDC GLUCOMTR-MCNC: 186 MG/DL — HIGH (ref 70–99)
GLUCOSE BLDC GLUCOMTR-MCNC: 187 MG/DL — HIGH (ref 70–99)
GLUCOSE BLDC GLUCOMTR-MCNC: 187 MG/DL — HIGH (ref 70–99)
GLUCOSE BLDC GLUCOMTR-MCNC: 188 MG/DL — HIGH (ref 70–99)
GLUCOSE BLDC GLUCOMTR-MCNC: 189 MG/DL — HIGH (ref 70–99)
GLUCOSE BLDC GLUCOMTR-MCNC: 189 MG/DL — HIGH (ref 70–99)
GLUCOSE BLDC GLUCOMTR-MCNC: 191 MG/DL — HIGH (ref 70–99)
GLUCOSE BLDC GLUCOMTR-MCNC: 192 MG/DL — HIGH (ref 70–99)
GLUCOSE BLDC GLUCOMTR-MCNC: 193 MG/DL — HIGH (ref 70–99)
GLUCOSE BLDC GLUCOMTR-MCNC: 194 MG/DL — HIGH (ref 70–99)
GLUCOSE BLDC GLUCOMTR-MCNC: 194 MG/DL — HIGH (ref 70–99)
GLUCOSE BLDC GLUCOMTR-MCNC: 195 MG/DL — HIGH (ref 70–99)
GLUCOSE BLDC GLUCOMTR-MCNC: 197 MG/DL — HIGH (ref 70–99)
GLUCOSE BLDC GLUCOMTR-MCNC: 199 MG/DL — HIGH (ref 70–99)
GLUCOSE BLDC GLUCOMTR-MCNC: 200 MG/DL — HIGH (ref 70–99)
GLUCOSE BLDC GLUCOMTR-MCNC: 201 MG/DL — HIGH (ref 70–99)
GLUCOSE BLDC GLUCOMTR-MCNC: 201 MG/DL — HIGH (ref 70–99)
GLUCOSE BLDC GLUCOMTR-MCNC: 203 MG/DL — HIGH (ref 70–99)
GLUCOSE BLDC GLUCOMTR-MCNC: 204 MG/DL — HIGH (ref 70–99)
GLUCOSE BLDC GLUCOMTR-MCNC: 205 MG/DL — HIGH (ref 70–99)
GLUCOSE BLDC GLUCOMTR-MCNC: 213 MG/DL — HIGH (ref 70–99)
GLUCOSE BLDC GLUCOMTR-MCNC: 214 MG/DL — HIGH (ref 70–99)
GLUCOSE BLDC GLUCOMTR-MCNC: 216 MG/DL — HIGH (ref 70–99)
GLUCOSE BLDC GLUCOMTR-MCNC: 216 MG/DL — HIGH (ref 70–99)
GLUCOSE BLDC GLUCOMTR-MCNC: 217 MG/DL — HIGH (ref 70–99)
GLUCOSE BLDC GLUCOMTR-MCNC: 217 MG/DL — HIGH (ref 70–99)
GLUCOSE BLDC GLUCOMTR-MCNC: 218 MG/DL — HIGH (ref 70–99)
GLUCOSE BLDC GLUCOMTR-MCNC: 219 MG/DL — HIGH (ref 70–99)
GLUCOSE BLDC GLUCOMTR-MCNC: 221 MG/DL — HIGH (ref 70–99)
GLUCOSE BLDC GLUCOMTR-MCNC: 225 MG/DL — HIGH (ref 70–99)
GLUCOSE BLDC GLUCOMTR-MCNC: 232 MG/DL — HIGH (ref 70–99)
GLUCOSE BLDC GLUCOMTR-MCNC: 234 MG/DL — HIGH (ref 70–99)
GLUCOSE BLDC GLUCOMTR-MCNC: 235 MG/DL — HIGH (ref 70–99)
GLUCOSE BLDC GLUCOMTR-MCNC: 241 MG/DL — HIGH (ref 70–99)
GLUCOSE BLDC GLUCOMTR-MCNC: 252 MG/DL — HIGH (ref 70–99)
GLUCOSE BLDC GLUCOMTR-MCNC: 259 MG/DL — HIGH (ref 70–99)
GLUCOSE BLDC GLUCOMTR-MCNC: 265 MG/DL — HIGH (ref 70–99)
GLUCOSE BLDC GLUCOMTR-MCNC: 266 MG/DL — HIGH (ref 70–99)
GLUCOSE BLDC GLUCOMTR-MCNC: 266 MG/DL — HIGH (ref 70–99)
GLUCOSE BLDC GLUCOMTR-MCNC: 282 MG/DL — HIGH (ref 70–99)
GLUCOSE BLDC GLUCOMTR-MCNC: 296 MG/DL — HIGH (ref 70–99)
GLUCOSE BLDC GLUCOMTR-MCNC: 325 MG/DL — HIGH (ref 70–99)
GLUCOSE BLDC GLUCOMTR-MCNC: 36 MG/DL — CRITICAL LOW (ref 70–99)
GLUCOSE BLDC GLUCOMTR-MCNC: 372 MG/DL — HIGH (ref 70–99)
GLUCOSE BLDC GLUCOMTR-MCNC: 58 MG/DL — LOW (ref 70–99)
GLUCOSE BLDC GLUCOMTR-MCNC: 62 MG/DL — LOW (ref 70–99)
GLUCOSE BLDC GLUCOMTR-MCNC: 69 MG/DL — LOW (ref 70–99)
GLUCOSE BLDC GLUCOMTR-MCNC: 72 MG/DL — SIGNIFICANT CHANGE UP (ref 70–99)
GLUCOSE BLDC GLUCOMTR-MCNC: 74 MG/DL — SIGNIFICANT CHANGE UP (ref 70–99)
GLUCOSE BLDC GLUCOMTR-MCNC: 75 MG/DL — SIGNIFICANT CHANGE UP (ref 70–99)
GLUCOSE BLDC GLUCOMTR-MCNC: 75 MG/DL — SIGNIFICANT CHANGE UP (ref 70–99)
GLUCOSE BLDC GLUCOMTR-MCNC: 76 MG/DL — SIGNIFICANT CHANGE UP (ref 70–99)
GLUCOSE BLDC GLUCOMTR-MCNC: 80 MG/DL — SIGNIFICANT CHANGE UP (ref 70–99)
GLUCOSE BLDC GLUCOMTR-MCNC: 84 MG/DL — SIGNIFICANT CHANGE UP (ref 70–99)
GLUCOSE BLDC GLUCOMTR-MCNC: 85 MG/DL — SIGNIFICANT CHANGE UP (ref 70–99)
GLUCOSE BLDC GLUCOMTR-MCNC: 86 MG/DL — SIGNIFICANT CHANGE UP (ref 70–99)
GLUCOSE BLDC GLUCOMTR-MCNC: 86 MG/DL — SIGNIFICANT CHANGE UP (ref 70–99)
GLUCOSE BLDC GLUCOMTR-MCNC: 89 MG/DL — SIGNIFICANT CHANGE UP (ref 70–99)
GLUCOSE BLDC GLUCOMTR-MCNC: 89 MG/DL — SIGNIFICANT CHANGE UP (ref 70–99)
GLUCOSE BLDC GLUCOMTR-MCNC: 90 MG/DL — SIGNIFICANT CHANGE UP (ref 70–99)
GLUCOSE BLDC GLUCOMTR-MCNC: 90 MG/DL — SIGNIFICANT CHANGE UP (ref 70–99)
GLUCOSE BLDC GLUCOMTR-MCNC: 92 MG/DL — SIGNIFICANT CHANGE UP (ref 70–99)
GLUCOSE BLDC GLUCOMTR-MCNC: 93 MG/DL — SIGNIFICANT CHANGE UP (ref 70–99)
GLUCOSE BLDC GLUCOMTR-MCNC: 93 MG/DL — SIGNIFICANT CHANGE UP (ref 70–99)
GLUCOSE BLDC GLUCOMTR-MCNC: 94 MG/DL — SIGNIFICANT CHANGE UP (ref 70–99)
GLUCOSE BLDC GLUCOMTR-MCNC: 97 MG/DL — SIGNIFICANT CHANGE UP (ref 70–99)
GLUCOSE BLDC GLUCOMTR-MCNC: 97 MG/DL — SIGNIFICANT CHANGE UP (ref 70–99)
GLUCOSE BLDC GLUCOMTR-MCNC: 98 MG/DL — SIGNIFICANT CHANGE UP (ref 70–99)
GLUCOSE BLDC GLUCOMTR-MCNC: 98 MG/DL — SIGNIFICANT CHANGE UP (ref 70–99)
GLUCOSE BLDC GLUCOMTR-MCNC: 99 MG/DL — SIGNIFICANT CHANGE UP (ref 70–99)
GLUCOSE SERPL-MCNC: 10 MG/DL — CRITICAL LOW (ref 70–99)
GLUCOSE SERPL-MCNC: 102 MG/DL — HIGH (ref 70–99)
GLUCOSE SERPL-MCNC: 106 MG/DL — HIGH (ref 70–99)
GLUCOSE SERPL-MCNC: 111 MG/DL — HIGH (ref 70–99)
GLUCOSE SERPL-MCNC: 112 MG/DL — HIGH (ref 70–99)
GLUCOSE SERPL-MCNC: 120 MG/DL — HIGH (ref 70–99)
GLUCOSE SERPL-MCNC: 122 MG/DL — HIGH (ref 70–99)
GLUCOSE SERPL-MCNC: 123 MG/DL — HIGH (ref 70–99)
GLUCOSE SERPL-MCNC: 124 MG/DL — HIGH (ref 70–99)
GLUCOSE SERPL-MCNC: 125 MG/DL — HIGH (ref 70–99)
GLUCOSE SERPL-MCNC: 127 MG/DL — HIGH (ref 70–99)
GLUCOSE SERPL-MCNC: 127 MG/DL — HIGH (ref 70–99)
GLUCOSE SERPL-MCNC: 132 MG/DL — HIGH (ref 70–99)
GLUCOSE SERPL-MCNC: 134 MG/DL — HIGH (ref 70–99)
GLUCOSE SERPL-MCNC: 134 MG/DL — HIGH (ref 70–99)
GLUCOSE SERPL-MCNC: 135 MG/DL — HIGH (ref 70–99)
GLUCOSE SERPL-MCNC: 135 MG/DL — HIGH (ref 70–99)
GLUCOSE SERPL-MCNC: 137 MG/DL — HIGH (ref 70–99)
GLUCOSE SERPL-MCNC: 140 MG/DL — HIGH (ref 70–99)
GLUCOSE SERPL-MCNC: 143 MG/DL — HIGH (ref 70–99)
GLUCOSE SERPL-MCNC: 144 MG/DL — HIGH (ref 70–99)
GLUCOSE SERPL-MCNC: 144 MG/DL — HIGH (ref 70–99)
GLUCOSE SERPL-MCNC: 147 MG/DL — HIGH (ref 70–99)
GLUCOSE SERPL-MCNC: 148 MG/DL — HIGH (ref 70–99)
GLUCOSE SERPL-MCNC: 148 MG/DL — HIGH (ref 70–99)
GLUCOSE SERPL-MCNC: 149 MG/DL — HIGH (ref 70–99)
GLUCOSE SERPL-MCNC: 149 MG/DL — HIGH (ref 70–99)
GLUCOSE SERPL-MCNC: 151 MG/DL — HIGH (ref 70–99)
GLUCOSE SERPL-MCNC: 151 MG/DL — HIGH (ref 70–99)
GLUCOSE SERPL-MCNC: 152 MG/DL — HIGH (ref 70–99)
GLUCOSE SERPL-MCNC: 152 MG/DL — HIGH (ref 70–99)
GLUCOSE SERPL-MCNC: 153 MG/DL — HIGH (ref 70–99)
GLUCOSE SERPL-MCNC: 153 MG/DL — HIGH (ref 70–99)
GLUCOSE SERPL-MCNC: 155 MG/DL — HIGH (ref 70–99)
GLUCOSE SERPL-MCNC: 157 MG/DL — HIGH (ref 70–99)
GLUCOSE SERPL-MCNC: 158 MG/DL — HIGH (ref 70–99)
GLUCOSE SERPL-MCNC: 160 MG/DL — HIGH (ref 70–99)
GLUCOSE SERPL-MCNC: 162 MG/DL — HIGH (ref 70–99)
GLUCOSE SERPL-MCNC: 164 MG/DL — HIGH (ref 70–99)
GLUCOSE SERPL-MCNC: 164 MG/DL — HIGH (ref 70–99)
GLUCOSE SERPL-MCNC: 165 MG/DL — HIGH (ref 70–99)
GLUCOSE SERPL-MCNC: 165 MG/DL — HIGH (ref 70–99)
GLUCOSE SERPL-MCNC: 169 MG/DL — HIGH (ref 70–99)
GLUCOSE SERPL-MCNC: 169 MG/DL — HIGH (ref 70–99)
GLUCOSE SERPL-MCNC: 175 MG/DL — HIGH (ref 70–99)
GLUCOSE SERPL-MCNC: 183 MG/DL — HIGH (ref 70–99)
GLUCOSE SERPL-MCNC: 184 MG/DL — HIGH (ref 70–99)
GLUCOSE SERPL-MCNC: 184 MG/DL — HIGH (ref 70–99)
GLUCOSE SERPL-MCNC: 185 MG/DL — HIGH (ref 70–99)
GLUCOSE SERPL-MCNC: 188 MG/DL — HIGH (ref 70–99)
GLUCOSE SERPL-MCNC: 191 MG/DL — HIGH (ref 70–99)
GLUCOSE SERPL-MCNC: 194 MG/DL — HIGH (ref 70–99)
GLUCOSE SERPL-MCNC: 194 MG/DL — HIGH (ref 70–99)
GLUCOSE SERPL-MCNC: 201 MG/DL — HIGH (ref 70–99)
GLUCOSE SERPL-MCNC: 205 MG/DL — HIGH (ref 70–99)
GLUCOSE SERPL-MCNC: 205 MG/DL — HIGH (ref 70–99)
GLUCOSE SERPL-MCNC: 243 MG/DL — HIGH (ref 70–99)
GLUCOSE SERPL-MCNC: 295 MG/DL — HIGH (ref 70–99)
GLUCOSE SERPL-MCNC: 313 MG/DL — HIGH (ref 70–99)
GLUCOSE SERPL-MCNC: 333 MG/DL — HIGH (ref 70–99)
GLUCOSE SERPL-MCNC: 343 MG/DL — HIGH (ref 70–99)
GLUCOSE SERPL-MCNC: 43 MG/DL — CRITICAL LOW (ref 70–99)
GLUCOSE SERPL-MCNC: 74 MG/DL — SIGNIFICANT CHANGE UP (ref 70–99)
GLUCOSE SERPL-MCNC: 82 MG/DL — SIGNIFICANT CHANGE UP (ref 70–99)
GLUCOSE SERPL-MCNC: 83 MG/DL — SIGNIFICANT CHANGE UP (ref 70–99)
GLUCOSE SERPL-MCNC: 86 MG/DL — SIGNIFICANT CHANGE UP (ref 70–99)
GLUCOSE SERPL-MCNC: 89 MG/DL — SIGNIFICANT CHANGE UP (ref 70–99)
GLUCOSE SERPL-MCNC: 90 MG/DL — SIGNIFICANT CHANGE UP (ref 70–99)
GLUCOSE SERPL-MCNC: 93 MG/DL — SIGNIFICANT CHANGE UP (ref 70–99)
GLUCOSE SERPL-MCNC: 94 MG/DL — SIGNIFICANT CHANGE UP (ref 70–99)
GLUCOSE SERPL-MCNC: 94 MG/DL — SIGNIFICANT CHANGE UP (ref 70–99)
GLUCOSE SERPL-MCNC: 97 MG/DL — SIGNIFICANT CHANGE UP (ref 70–99)
GLUCOSE SERPL-MCNC: 99 MG/DL — SIGNIFICANT CHANGE UP (ref 70–99)
GLUCOSE UR QL: NEGATIVE — SIGNIFICANT CHANGE UP
GRAM STN FLD: SIGNIFICANT CHANGE UP
HAPTOGLOB SERPL-MCNC: 275 MG/DL — HIGH (ref 34–200)
HAV IGM SER-ACNC: SIGNIFICANT CHANGE UP
HBV CORE AB SER-ACNC: SIGNIFICANT CHANGE UP
HBV CORE IGM SER-ACNC: SIGNIFICANT CHANGE UP
HBV SURFACE AB SER-ACNC: <3 MIU/ML — LOW
HBV SURFACE AB SER-ACNC: SIGNIFICANT CHANGE UP
HBV SURFACE AB SER-ACNC: SIGNIFICANT CHANGE UP
HBV SURFACE AG SER-ACNC: SIGNIFICANT CHANGE UP
HBV SURFACE AG SER-ACNC: SIGNIFICANT CHANGE UP
HCO3 BLDA-SCNC: 27 MMOL/L — SIGNIFICANT CHANGE UP (ref 21–28)
HCO3 BLDA-SCNC: 28 MMOL/L — SIGNIFICANT CHANGE UP (ref 21–28)
HCT VFR BLD CALC: 20.3 % — LOW (ref 42–52)
HCT VFR BLD CALC: 20.5 % — LOW (ref 42–52)
HCT VFR BLD CALC: 20.8 % — LOW (ref 42–52)
HCT VFR BLD CALC: 20.8 % — LOW (ref 42–52)
HCT VFR BLD CALC: 21.2 % — LOW (ref 42–52)
HCT VFR BLD CALC: 21.3 % — LOW (ref 42–52)
HCT VFR BLD CALC: 21.4 % — LOW (ref 42–52)
HCT VFR BLD CALC: 21.4 % — LOW (ref 42–52)
HCT VFR BLD CALC: 21.6 % — LOW (ref 42–52)
HCT VFR BLD CALC: 22.1 % — LOW (ref 42–52)
HCT VFR BLD CALC: 22.2 % — LOW (ref 42–52)
HCT VFR BLD CALC: 22.3 % — LOW (ref 42–52)
HCT VFR BLD CALC: 22.4 % — LOW (ref 42–52)
HCT VFR BLD CALC: 22.5 % — LOW (ref 42–52)
HCT VFR BLD CALC: 22.5 % — LOW (ref 42–52)
HCT VFR BLD CALC: 22.7 % — LOW (ref 42–52)
HCT VFR BLD CALC: 22.9 % — LOW (ref 42–52)
HCT VFR BLD CALC: 23.2 % — LOW (ref 42–52)
HCT VFR BLD CALC: 23.3 % — LOW (ref 42–52)
HCT VFR BLD CALC: 23.5 % — LOW (ref 42–52)
HCT VFR BLD CALC: 23.6 % — LOW (ref 42–52)
HCT VFR BLD CALC: 23.6 % — LOW (ref 42–52)
HCT VFR BLD CALC: 23.7 % — LOW (ref 42–52)
HCT VFR BLD CALC: 23.9 % — LOW (ref 42–52)
HCT VFR BLD CALC: 23.9 % — LOW (ref 42–52)
HCT VFR BLD CALC: 24.1 % — LOW (ref 42–52)
HCT VFR BLD CALC: 24.1 % — LOW (ref 42–52)
HCT VFR BLD CALC: 24.2 % — LOW (ref 42–52)
HCT VFR BLD CALC: 24.2 % — LOW (ref 42–52)
HCT VFR BLD CALC: 24.3 % — LOW (ref 42–52)
HCT VFR BLD CALC: 24.3 % — LOW (ref 42–52)
HCT VFR BLD CALC: 24.4 % — LOW (ref 42–52)
HCT VFR BLD CALC: 24.6 % — LOW (ref 42–52)
HCT VFR BLD CALC: 24.6 % — LOW (ref 42–52)
HCT VFR BLD CALC: 24.7 % — LOW (ref 42–52)
HCT VFR BLD CALC: 24.9 % — LOW (ref 42–52)
HCT VFR BLD CALC: 25.1 % — LOW (ref 42–52)
HCT VFR BLD CALC: 25.2 % — LOW (ref 42–52)
HCT VFR BLD CALC: 25.6 % — LOW (ref 42–52)
HCT VFR BLD CALC: 25.7 % — LOW (ref 42–52)
HCT VFR BLD CALC: 26.1 % — LOW (ref 42–52)
HCT VFR BLD CALC: 26.9 % — LOW (ref 42–52)
HCT VFR BLD CALC: 27 % — LOW (ref 42–52)
HCT VFR BLD CALC: 27 % — LOW (ref 42–52)
HCT VFR BLD CALC: 27.6 % — LOW (ref 42–52)
HCT VFR BLD CALC: 28.8 % — LOW (ref 42–52)
HCT VFR BLD CALC: 30.3 % — LOW (ref 42–52)
HCV AB S/CO SERPL IA: 0.04 COI — SIGNIFICANT CHANGE UP
HCV AB S/CO SERPL IA: 0.16 S/CO — SIGNIFICANT CHANGE UP (ref 0–0.99)
HCV AB SERPL-IMP: SIGNIFICANT CHANGE UP
HCV AB SERPL-IMP: SIGNIFICANT CHANGE UP
HDLC SERPL-MCNC: 42 MG/DL — SIGNIFICANT CHANGE UP
HGB BLD-MCNC: 6.5 G/DL — CRITICAL LOW (ref 14–18)
HGB BLD-MCNC: 6.6 G/DL — CRITICAL LOW (ref 14–18)
HGB BLD-MCNC: 6.7 G/DL — CRITICAL LOW (ref 14–18)
HGB BLD-MCNC: 6.7 G/DL — CRITICAL LOW (ref 14–18)
HGB BLD-MCNC: 6.8 G/DL — CRITICAL LOW (ref 14–18)
HGB BLD-MCNC: 6.8 G/DL — CRITICAL LOW (ref 14–18)
HGB BLD-MCNC: 6.9 G/DL — CRITICAL LOW (ref 14–18)
HGB BLD-MCNC: 6.9 G/DL — CRITICAL LOW (ref 14–18)
HGB BLD-MCNC: 7 G/DL — LOW (ref 14–18)
HGB BLD-MCNC: 7 G/DL — LOW (ref 14–18)
HGB BLD-MCNC: 7.1 G/DL — LOW (ref 14–18)
HGB BLD-MCNC: 7.1 G/DL — LOW (ref 14–18)
HGB BLD-MCNC: 7.2 G/DL — LOW (ref 14–18)
HGB BLD-MCNC: 7.4 G/DL — LOW (ref 14–18)
HGB BLD-MCNC: 7.4 G/DL — LOW (ref 14–18)
HGB BLD-MCNC: 7.5 G/DL — LOW (ref 14–18)
HGB BLD-MCNC: 7.6 G/DL — LOW (ref 14–18)
HGB BLD-MCNC: 7.7 G/DL — LOW (ref 14–18)
HGB BLD-MCNC: 7.8 G/DL — LOW (ref 14–18)
HGB BLD-MCNC: 7.9 G/DL — LOW (ref 14–18)
HGB BLD-MCNC: 8.1 G/DL — LOW (ref 14–18)
HGB BLD-MCNC: 8.2 G/DL — LOW (ref 14–18)
HGB BLD-MCNC: 8.3 G/DL — LOW (ref 14–18)
HGB BLD-MCNC: 8.5 G/DL — LOW (ref 14–18)
HGB BLD-MCNC: 8.7 G/DL — LOW (ref 14–18)
HGB BLD-MCNC: 8.7 G/DL — LOW (ref 14–18)
HGB BLD-MCNC: 9 G/DL — LOW (ref 14–18)
HGB BLD-MCNC: 9 G/DL — LOW (ref 14–18)
HGB BLD-MCNC: 9.9 G/DL — LOW (ref 14–18)
HIV 1 & 2 AB SERPL IA.RAPID: SIGNIFICANT CHANGE UP
HIV 1+2 AB+HIV1 P24 AG SERPL QL IA: SIGNIFICANT CHANGE UP
HOROWITZ INDEX BLDA+IHG-RTO: 45 — SIGNIFICANT CHANGE UP
HYALINE CASTS # UR AUTO: 12 /LPF — HIGH (ref 0–7)
HYALINE CASTS # UR AUTO: 15 /LPF — HIGH (ref 0–7)
HYALINE CASTS # UR AUTO: 3 /LPF — SIGNIFICANT CHANGE UP (ref 0–7)
IMM GRANULOCYTES NFR BLD AUTO: 0.2 % — SIGNIFICANT CHANGE UP (ref 0.1–0.3)
IMM GRANULOCYTES NFR BLD AUTO: 0.3 % — SIGNIFICANT CHANGE UP (ref 0.1–0.3)
IMM GRANULOCYTES NFR BLD AUTO: 0.4 % — HIGH (ref 0.1–0.3)
IMM GRANULOCYTES NFR BLD AUTO: 0.5 % — HIGH (ref 0.1–0.3)
IMM GRANULOCYTES NFR BLD AUTO: 0.6 % — HIGH (ref 0.1–0.3)
IMM GRANULOCYTES NFR BLD AUTO: 0.7 % — HIGH (ref 0.1–0.3)
IMM GRANULOCYTES NFR BLD AUTO: 0.8 % — HIGH (ref 0.1–0.3)
IMM GRANULOCYTES NFR BLD AUTO: 0.8 % — HIGH (ref 0.1–0.3)
IMM GRANULOCYTES NFR BLD AUTO: 0.9 % — HIGH (ref 0.1–0.3)
IMM GRANULOCYTES NFR BLD AUTO: 1 % — HIGH (ref 0.1–0.3)
IMM GRANULOCYTES NFR BLD AUTO: 1.2 % — HIGH (ref 0.1–0.3)
IMM GRANULOCYTES NFR BLD AUTO: 1.3 % — HIGH (ref 0.1–0.3)
IMM GRANULOCYTES NFR BLD AUTO: 1.5 % — HIGH (ref 0.1–0.3)
IMM GRANULOCYTES NFR BLD AUTO: 1.6 % — HIGH (ref 0.1–0.3)
IMM GRANULOCYTES NFR BLD AUTO: 1.8 % — HIGH (ref 0.1–0.3)
IMM GRANULOCYTES NFR BLD AUTO: 2.1 % — HIGH (ref 0.1–0.3)
INR BLD: 0.99 RATIO — SIGNIFICANT CHANGE UP (ref 0.65–1.3)
INR BLD: 1.08 RATIO — SIGNIFICANT CHANGE UP (ref 0.65–1.3)
INR BLD: 1.23 RATIO — SIGNIFICANT CHANGE UP (ref 0.65–1.3)
INR BLD: 1.4 RATIO — HIGH (ref 0.65–1.3)
IRON SATN MFR SERPL: 36 % — SIGNIFICANT CHANGE UP (ref 15–50)
IRON SATN MFR SERPL: 46 % — SIGNIFICANT CHANGE UP (ref 15–50)
IRON SATN MFR SERPL: 49 UG/DL — SIGNIFICANT CHANGE UP (ref 35–150)
IRON SATN MFR SERPL: 51 UG/DL — SIGNIFICANT CHANGE UP (ref 35–150)
IRON SATN MFR SERPL: 80 % — HIGH (ref 15–50)
IRON SATN MFR SERPL: 81 UG/DL — SIGNIFICANT CHANGE UP (ref 35–150)
KETONES UR-MCNC: NEGATIVE — SIGNIFICANT CHANGE UP
KETONES UR-MCNC: SIGNIFICANT CHANGE UP
KETONES UR-MCNC: SIGNIFICANT CHANGE UP
LACTATE SERPL-SCNC: 0.6 MMOL/L — LOW (ref 0.7–2)
LACTATE SERPL-SCNC: 3 MMOL/L — HIGH (ref 0.7–2)
LACTATE SERPL-SCNC: 3.7 MMOL/L — HIGH (ref 0.7–2)
LDH SERPL L TO P-CCNC: 211 U/L — SIGNIFICANT CHANGE UP (ref 50–242)
LEUKOCYTE ESTERASE UR-ACNC: ABNORMAL
LEVETIRACETAM SERPL-MCNC: 32.6 UG/ML — SIGNIFICANT CHANGE UP (ref 10–40)
LEVETIRACETAM SERPL-MCNC: 49.4 UG/ML — HIGH (ref 10–40)
LIPID PNL WITH DIRECT LDL SERPL: 23 MG/DL — SIGNIFICANT CHANGE UP
LYMPHOCYTES # BLD AUTO: 0.76 K/UL — LOW (ref 1.2–3.4)
LYMPHOCYTES # BLD AUTO: 0.79 K/UL — LOW (ref 1.2–3.4)
LYMPHOCYTES # BLD AUTO: 0.83 K/UL — LOW (ref 1.2–3.4)
LYMPHOCYTES # BLD AUTO: 0.84 K/UL — LOW (ref 1.2–3.4)
LYMPHOCYTES # BLD AUTO: 0.91 K/UL — LOW (ref 1.2–3.4)
LYMPHOCYTES # BLD AUTO: 0.93 K/UL — LOW (ref 1.2–3.4)
LYMPHOCYTES # BLD AUTO: 1.1 K/UL — LOW (ref 1.2–3.4)
LYMPHOCYTES # BLD AUTO: 1.18 K/UL — LOW (ref 1.2–3.4)
LYMPHOCYTES # BLD AUTO: 1.2 K/UL — SIGNIFICANT CHANGE UP (ref 1.2–3.4)
LYMPHOCYTES # BLD AUTO: 1.21 K/UL — SIGNIFICANT CHANGE UP (ref 1.2–3.4)
LYMPHOCYTES # BLD AUTO: 1.24 K/UL — SIGNIFICANT CHANGE UP (ref 1.2–3.4)
LYMPHOCYTES # BLD AUTO: 1.26 K/UL — SIGNIFICANT CHANGE UP (ref 1.2–3.4)
LYMPHOCYTES # BLD AUTO: 1.38 K/UL — SIGNIFICANT CHANGE UP (ref 1.2–3.4)
LYMPHOCYTES # BLD AUTO: 1.52 K/UL — SIGNIFICANT CHANGE UP (ref 1.2–3.4)
LYMPHOCYTES # BLD AUTO: 1.56 K/UL — SIGNIFICANT CHANGE UP (ref 1.2–3.4)
LYMPHOCYTES # BLD AUTO: 1.56 K/UL — SIGNIFICANT CHANGE UP (ref 1.2–3.4)
LYMPHOCYTES # BLD AUTO: 1.57 K/UL — SIGNIFICANT CHANGE UP (ref 1.2–3.4)
LYMPHOCYTES # BLD AUTO: 1.61 K/UL — SIGNIFICANT CHANGE UP (ref 1.2–3.4)
LYMPHOCYTES # BLD AUTO: 1.63 K/UL — SIGNIFICANT CHANGE UP (ref 1.2–3.4)
LYMPHOCYTES # BLD AUTO: 1.65 K/UL — SIGNIFICANT CHANGE UP (ref 1.2–3.4)
LYMPHOCYTES # BLD AUTO: 1.65 K/UL — SIGNIFICANT CHANGE UP (ref 1.2–3.4)
LYMPHOCYTES # BLD AUTO: 1.81 K/UL — SIGNIFICANT CHANGE UP (ref 1.2–3.4)
LYMPHOCYTES # BLD AUTO: 1.83 K/UL — SIGNIFICANT CHANGE UP (ref 1.2–3.4)
LYMPHOCYTES # BLD AUTO: 10.8 % — LOW (ref 20.5–51.1)
LYMPHOCYTES # BLD AUTO: 11.1 % — LOW (ref 20.5–51.1)
LYMPHOCYTES # BLD AUTO: 11.7 % — LOW (ref 20.5–51.1)
LYMPHOCYTES # BLD AUTO: 11.8 % — LOW (ref 20.5–51.1)
LYMPHOCYTES # BLD AUTO: 11.9 % — LOW (ref 20.5–51.1)
LYMPHOCYTES # BLD AUTO: 13.1 % — LOW (ref 20.5–51.1)
LYMPHOCYTES # BLD AUTO: 13.5 % — LOW (ref 20.5–51.1)
LYMPHOCYTES # BLD AUTO: 14.1 % — LOW (ref 20.5–51.1)
LYMPHOCYTES # BLD AUTO: 14.5 % — LOW (ref 20.5–51.1)
LYMPHOCYTES # BLD AUTO: 14.7 % — LOW (ref 20.5–51.1)
LYMPHOCYTES # BLD AUTO: 15.2 % — LOW (ref 20.5–51.1)
LYMPHOCYTES # BLD AUTO: 15.2 % — LOW (ref 20.5–51.1)
LYMPHOCYTES # BLD AUTO: 15.4 % — LOW (ref 20.5–51.1)
LYMPHOCYTES # BLD AUTO: 15.6 % — LOW (ref 20.5–51.1)
LYMPHOCYTES # BLD AUTO: 15.8 % — LOW (ref 20.5–51.1)
LYMPHOCYTES # BLD AUTO: 15.9 % — LOW (ref 20.5–51.1)
LYMPHOCYTES # BLD AUTO: 16.8 % — LOW (ref 20.5–51.1)
LYMPHOCYTES # BLD AUTO: 17.5 % — LOW (ref 20.5–51.1)
LYMPHOCYTES # BLD AUTO: 17.8 % — LOW (ref 20.5–51.1)
LYMPHOCYTES # BLD AUTO: 17.8 % — LOW (ref 20.5–51.1)
LYMPHOCYTES # BLD AUTO: 18 % — LOW (ref 20.5–51.1)
LYMPHOCYTES # BLD AUTO: 19.6 % — LOW (ref 20.5–51.1)
LYMPHOCYTES # BLD AUTO: 2.02 K/UL — SIGNIFICANT CHANGE UP (ref 1.2–3.4)
LYMPHOCYTES # BLD AUTO: 2.07 K/UL — SIGNIFICANT CHANGE UP (ref 1.2–3.4)
LYMPHOCYTES # BLD AUTO: 2.1 K/UL — SIGNIFICANT CHANGE UP (ref 1.2–3.4)
LYMPHOCYTES # BLD AUTO: 2.12 K/UL — SIGNIFICANT CHANGE UP (ref 1.2–3.4)
LYMPHOCYTES # BLD AUTO: 2.36 K/UL — SIGNIFICANT CHANGE UP (ref 1.2–3.4)
LYMPHOCYTES # BLD AUTO: 2.6 K/UL — SIGNIFICANT CHANGE UP (ref 1.2–3.4)
LYMPHOCYTES # BLD AUTO: 2.61 K/UL — SIGNIFICANT CHANGE UP (ref 1.2–3.4)
LYMPHOCYTES # BLD AUTO: 2.71 K/UL — SIGNIFICANT CHANGE UP (ref 1.2–3.4)
LYMPHOCYTES # BLD AUTO: 2.93 K/UL — SIGNIFICANT CHANGE UP (ref 1.2–3.4)
LYMPHOCYTES # BLD AUTO: 2.94 K/UL — SIGNIFICANT CHANGE UP (ref 1.2–3.4)
LYMPHOCYTES # BLD AUTO: 20.4 % — LOW (ref 20.5–51.1)
LYMPHOCYTES # BLD AUTO: 20.7 % — SIGNIFICANT CHANGE UP (ref 20.5–51.1)
LYMPHOCYTES # BLD AUTO: 20.9 % — SIGNIFICANT CHANGE UP (ref 20.5–51.1)
LYMPHOCYTES # BLD AUTO: 21.6 % — SIGNIFICANT CHANGE UP (ref 20.5–51.1)
LYMPHOCYTES # BLD AUTO: 3.53 K/UL — HIGH (ref 1.2–3.4)
LYMPHOCYTES # BLD AUTO: 6.3 % — LOW (ref 20.5–51.1)
LYMPHOCYTES # BLD AUTO: 7.3 % — LOW (ref 20.5–51.1)
LYMPHOCYTES # BLD AUTO: 8.2 % — LOW (ref 20.5–51.1)
LYMPHOCYTES # BLD AUTO: 8.9 % — LOW (ref 20.5–51.1)
LYMPHOCYTES # BLD AUTO: 9.3 % — LOW (ref 20.5–51.1)
LYMPHOCYTES # BLD AUTO: 9.4 % — LOW (ref 20.5–51.1)
MAGNESIUM SERPL-MCNC: 2 MG/DL — SIGNIFICANT CHANGE UP (ref 1.8–2.4)
MAGNESIUM SERPL-MCNC: 2.1 MG/DL — SIGNIFICANT CHANGE UP (ref 1.8–2.4)
MAGNESIUM SERPL-MCNC: 2.2 MG/DL — SIGNIFICANT CHANGE UP (ref 1.8–2.4)
MAGNESIUM SERPL-MCNC: 2.3 MG/DL — SIGNIFICANT CHANGE UP (ref 1.8–2.4)
MAGNESIUM SERPL-MCNC: 2.4 MG/DL — SIGNIFICANT CHANGE UP (ref 1.8–2.4)
MAGNESIUM SERPL-MCNC: 2.5 MG/DL — HIGH (ref 1.8–2.4)
MAGNESIUM SERPL-MCNC: 2.6 MG/DL — HIGH (ref 1.8–2.4)
MAGNESIUM SERPL-MCNC: 2.7 MG/DL — HIGH (ref 1.8–2.4)
MAGNESIUM SERPL-MCNC: 2.7 MG/DL — HIGH (ref 1.8–2.4)
MAGNESIUM SERPL-MCNC: 2.8 MG/DL — HIGH (ref 1.8–2.4)
MAGNESIUM SERPL-MCNC: 2.8 MG/DL — HIGH (ref 1.8–2.4)
MCHC RBC-ENTMCNC: 28.1 PG — SIGNIFICANT CHANGE UP (ref 27–31)
MCHC RBC-ENTMCNC: 28.3 PG — SIGNIFICANT CHANGE UP (ref 27–31)
MCHC RBC-ENTMCNC: 28.5 PG — SIGNIFICANT CHANGE UP (ref 27–31)
MCHC RBC-ENTMCNC: 28.6 PG — SIGNIFICANT CHANGE UP (ref 27–31)
MCHC RBC-ENTMCNC: 28.7 PG — SIGNIFICANT CHANGE UP (ref 27–31)
MCHC RBC-ENTMCNC: 28.8 PG — SIGNIFICANT CHANGE UP (ref 27–31)
MCHC RBC-ENTMCNC: 28.8 PG — SIGNIFICANT CHANGE UP (ref 27–31)
MCHC RBC-ENTMCNC: 28.9 PG — SIGNIFICANT CHANGE UP (ref 27–31)
MCHC RBC-ENTMCNC: 29 PG — SIGNIFICANT CHANGE UP (ref 27–31)
MCHC RBC-ENTMCNC: 29.1 PG — SIGNIFICANT CHANGE UP (ref 27–31)
MCHC RBC-ENTMCNC: 29.1 PG — SIGNIFICANT CHANGE UP (ref 27–31)
MCHC RBC-ENTMCNC: 29.2 PG — SIGNIFICANT CHANGE UP (ref 27–31)
MCHC RBC-ENTMCNC: 29.2 PG — SIGNIFICANT CHANGE UP (ref 27–31)
MCHC RBC-ENTMCNC: 29.3 PG — SIGNIFICANT CHANGE UP (ref 27–31)
MCHC RBC-ENTMCNC: 29.4 PG — SIGNIFICANT CHANGE UP (ref 27–31)
MCHC RBC-ENTMCNC: 29.5 PG — SIGNIFICANT CHANGE UP (ref 27–31)
MCHC RBC-ENTMCNC: 29.6 PG — SIGNIFICANT CHANGE UP (ref 27–31)
MCHC RBC-ENTMCNC: 29.7 PG — SIGNIFICANT CHANGE UP (ref 27–31)
MCHC RBC-ENTMCNC: 29.8 PG — SIGNIFICANT CHANGE UP (ref 27–31)
MCHC RBC-ENTMCNC: 29.9 PG — SIGNIFICANT CHANGE UP (ref 27–31)
MCHC RBC-ENTMCNC: 30 PG — SIGNIFICANT CHANGE UP (ref 27–31)
MCHC RBC-ENTMCNC: 30.1 PG — SIGNIFICANT CHANGE UP (ref 27–31)
MCHC RBC-ENTMCNC: 30.2 PG — SIGNIFICANT CHANGE UP (ref 27–31)
MCHC RBC-ENTMCNC: 30.3 G/DL — LOW (ref 32–37)
MCHC RBC-ENTMCNC: 30.3 PG — SIGNIFICANT CHANGE UP (ref 27–31)
MCHC RBC-ENTMCNC: 30.4 G/DL — LOW (ref 32–37)
MCHC RBC-ENTMCNC: 30.4 PG — SIGNIFICANT CHANGE UP (ref 27–31)
MCHC RBC-ENTMCNC: 30.6 G/DL — LOW (ref 32–37)
MCHC RBC-ENTMCNC: 30.6 PG — SIGNIFICANT CHANGE UP (ref 27–31)
MCHC RBC-ENTMCNC: 30.7 G/DL — LOW (ref 32–37)
MCHC RBC-ENTMCNC: 30.7 PG — SIGNIFICANT CHANGE UP (ref 27–31)
MCHC RBC-ENTMCNC: 30.8 G/DL — LOW (ref 32–37)
MCHC RBC-ENTMCNC: 30.8 G/DL — LOW (ref 32–37)
MCHC RBC-ENTMCNC: 30.9 G/DL — LOW (ref 32–37)
MCHC RBC-ENTMCNC: 31 G/DL — LOW (ref 32–37)
MCHC RBC-ENTMCNC: 31.1 G/DL — LOW (ref 32–37)
MCHC RBC-ENTMCNC: 31.1 G/DL — LOW (ref 32–37)
MCHC RBC-ENTMCNC: 31.3 G/DL — LOW (ref 32–37)
MCHC RBC-ENTMCNC: 31.3 G/DL — LOW (ref 32–37)
MCHC RBC-ENTMCNC: 31.4 G/DL — LOW (ref 32–37)
MCHC RBC-ENTMCNC: 31.5 G/DL — LOW (ref 32–37)
MCHC RBC-ENTMCNC: 31.6 G/DL — LOW (ref 32–37)
MCHC RBC-ENTMCNC: 31.6 G/DL — LOW (ref 32–37)
MCHC RBC-ENTMCNC: 31.7 G/DL — LOW (ref 32–37)
MCHC RBC-ENTMCNC: 31.7 G/DL — LOW (ref 32–37)
MCHC RBC-ENTMCNC: 31.8 G/DL — LOW (ref 32–37)
MCHC RBC-ENTMCNC: 31.9 G/DL — LOW (ref 32–37)
MCHC RBC-ENTMCNC: 32 G/DL — SIGNIFICANT CHANGE UP (ref 32–37)
MCHC RBC-ENTMCNC: 32.1 G/DL — SIGNIFICANT CHANGE UP (ref 32–37)
MCHC RBC-ENTMCNC: 32.1 G/DL — SIGNIFICANT CHANGE UP (ref 32–37)
MCHC RBC-ENTMCNC: 32.2 G/DL — SIGNIFICANT CHANGE UP (ref 32–37)
MCHC RBC-ENTMCNC: 32.3 G/DL — SIGNIFICANT CHANGE UP (ref 32–37)
MCHC RBC-ENTMCNC: 32.5 G/DL — SIGNIFICANT CHANGE UP (ref 32–37)
MCHC RBC-ENTMCNC: 32.6 G/DL — SIGNIFICANT CHANGE UP (ref 32–37)
MCHC RBC-ENTMCNC: 32.7 G/DL — SIGNIFICANT CHANGE UP (ref 32–37)
MCHC RBC-ENTMCNC: 33 G/DL — SIGNIFICANT CHANGE UP (ref 32–37)
MCHC RBC-ENTMCNC: 33.3 G/DL — SIGNIFICANT CHANGE UP (ref 32–37)
MCHC RBC-ENTMCNC: 33.5 G/DL — SIGNIFICANT CHANGE UP (ref 32–37)
MCV RBC AUTO: 86.8 FL — SIGNIFICANT CHANGE UP (ref 80–94)
MCV RBC AUTO: 86.8 FL — SIGNIFICANT CHANGE UP (ref 80–94)
MCV RBC AUTO: 87.2 FL — SIGNIFICANT CHANGE UP (ref 80–94)
MCV RBC AUTO: 88.1 FL — SIGNIFICANT CHANGE UP (ref 80–94)
MCV RBC AUTO: 88.3 FL — SIGNIFICANT CHANGE UP (ref 80–94)
MCV RBC AUTO: 89.7 FL — SIGNIFICANT CHANGE UP (ref 80–94)
MCV RBC AUTO: 90.5 FL — SIGNIFICANT CHANGE UP (ref 80–94)
MCV RBC AUTO: 90.8 FL — SIGNIFICANT CHANGE UP (ref 80–94)
MCV RBC AUTO: 90.9 FL — SIGNIFICANT CHANGE UP (ref 80–94)
MCV RBC AUTO: 91.4 FL — SIGNIFICANT CHANGE UP (ref 80–94)
MCV RBC AUTO: 91.8 FL — SIGNIFICANT CHANGE UP (ref 80–94)
MCV RBC AUTO: 91.9 FL — SIGNIFICANT CHANGE UP (ref 80–94)
MCV RBC AUTO: 92 FL — SIGNIFICANT CHANGE UP (ref 80–94)
MCV RBC AUTO: 92.1 FL — SIGNIFICANT CHANGE UP (ref 80–94)
MCV RBC AUTO: 92.2 FL — SIGNIFICANT CHANGE UP (ref 80–94)
MCV RBC AUTO: 92.3 FL — SIGNIFICANT CHANGE UP (ref 80–94)
MCV RBC AUTO: 92.4 FL — SIGNIFICANT CHANGE UP (ref 80–94)
MCV RBC AUTO: 92.5 FL — SIGNIFICANT CHANGE UP (ref 80–94)
MCV RBC AUTO: 92.7 FL — SIGNIFICANT CHANGE UP (ref 80–94)
MCV RBC AUTO: 93.1 FL — SIGNIFICANT CHANGE UP (ref 80–94)
MCV RBC AUTO: 93.1 FL — SIGNIFICANT CHANGE UP (ref 80–94)
MCV RBC AUTO: 93.4 FL — SIGNIFICANT CHANGE UP (ref 80–94)
MCV RBC AUTO: 93.6 FL — SIGNIFICANT CHANGE UP (ref 80–94)
MCV RBC AUTO: 93.7 FL — SIGNIFICANT CHANGE UP (ref 80–94)
MCV RBC AUTO: 93.8 FL — SIGNIFICANT CHANGE UP (ref 80–94)
MCV RBC AUTO: 93.9 FL — SIGNIFICANT CHANGE UP (ref 80–94)
MCV RBC AUTO: 94 FL — SIGNIFICANT CHANGE UP (ref 80–94)
MCV RBC AUTO: 94 FL — SIGNIFICANT CHANGE UP (ref 80–94)
MCV RBC AUTO: 94.2 FL — HIGH (ref 80–94)
MCV RBC AUTO: 94.3 FL — HIGH (ref 80–94)
MCV RBC AUTO: 94.4 FL — HIGH (ref 80–94)
MCV RBC AUTO: 94.4 FL — HIGH (ref 80–94)
MCV RBC AUTO: 94.5 FL — HIGH (ref 80–94)
MCV RBC AUTO: 94.5 FL — HIGH (ref 80–94)
MCV RBC AUTO: 94.6 FL — HIGH (ref 80–94)
MCV RBC AUTO: 94.7 FL — HIGH (ref 80–94)
MCV RBC AUTO: 94.7 FL — HIGH (ref 80–94)
MCV RBC AUTO: 94.9 FL — HIGH (ref 80–94)
MCV RBC AUTO: 95 FL — HIGH (ref 80–94)
MCV RBC AUTO: 95 FL — HIGH (ref 80–94)
MCV RBC AUTO: 95.1 FL — HIGH (ref 80–94)
MCV RBC AUTO: 95.1 FL — HIGH (ref 80–94)
MCV RBC AUTO: 95.4 FL — HIGH (ref 80–94)
MCV RBC AUTO: 95.5 FL — HIGH (ref 80–94)
MCV RBC AUTO: 96 FL — HIGH (ref 80–94)
MCV RBC AUTO: 96 FL — HIGH (ref 80–94)
MCV RBC AUTO: 96.2 FL — HIGH (ref 80–94)
MCV RBC AUTO: 97 FL — HIGH (ref 80–94)
METHOD TYPE: SIGNIFICANT CHANGE UP
MONOCYTES # BLD AUTO: 0.16 K/UL — SIGNIFICANT CHANGE UP (ref 0.1–0.6)
MONOCYTES # BLD AUTO: 0.57 K/UL — SIGNIFICANT CHANGE UP (ref 0.1–0.6)
MONOCYTES # BLD AUTO: 0.59 K/UL — SIGNIFICANT CHANGE UP (ref 0.1–0.6)
MONOCYTES # BLD AUTO: 0.62 K/UL — HIGH (ref 0.1–0.6)
MONOCYTES # BLD AUTO: 0.62 K/UL — HIGH (ref 0.1–0.6)
MONOCYTES # BLD AUTO: 0.63 K/UL — HIGH (ref 0.1–0.6)
MONOCYTES # BLD AUTO: 0.64 K/UL — HIGH (ref 0.1–0.6)
MONOCYTES # BLD AUTO: 0.68 K/UL — HIGH (ref 0.1–0.6)
MONOCYTES # BLD AUTO: 0.68 K/UL — HIGH (ref 0.1–0.6)
MONOCYTES # BLD AUTO: 0.71 K/UL — HIGH (ref 0.1–0.6)
MONOCYTES # BLD AUTO: 0.71 K/UL — HIGH (ref 0.1–0.6)
MONOCYTES # BLD AUTO: 0.72 K/UL — HIGH (ref 0.1–0.6)
MONOCYTES # BLD AUTO: 0.73 K/UL — HIGH (ref 0.1–0.6)
MONOCYTES # BLD AUTO: 0.73 K/UL — HIGH (ref 0.1–0.6)
MONOCYTES # BLD AUTO: 0.74 K/UL — HIGH (ref 0.1–0.6)
MONOCYTES # BLD AUTO: 0.74 K/UL — HIGH (ref 0.1–0.6)
MONOCYTES # BLD AUTO: 0.78 K/UL — HIGH (ref 0.1–0.6)
MONOCYTES # BLD AUTO: 0.79 K/UL — HIGH (ref 0.1–0.6)
MONOCYTES # BLD AUTO: 0.81 K/UL — HIGH (ref 0.1–0.6)
MONOCYTES # BLD AUTO: 0.82 K/UL — HIGH (ref 0.1–0.6)
MONOCYTES # BLD AUTO: 0.83 K/UL — HIGH (ref 0.1–0.6)
MONOCYTES # BLD AUTO: 0.85 K/UL — HIGH (ref 0.1–0.6)
MONOCYTES # BLD AUTO: 0.86 K/UL — HIGH (ref 0.1–0.6)
MONOCYTES # BLD AUTO: 0.9 K/UL — HIGH (ref 0.1–0.6)
MONOCYTES # BLD AUTO: 0.92 K/UL — HIGH (ref 0.1–0.6)
MONOCYTES # BLD AUTO: 0.92 K/UL — HIGH (ref 0.1–0.6)
MONOCYTES # BLD AUTO: 0.93 K/UL — HIGH (ref 0.1–0.6)
MONOCYTES # BLD AUTO: 0.95 K/UL — HIGH (ref 0.1–0.6)
MONOCYTES # BLD AUTO: 0.98 K/UL — HIGH (ref 0.1–0.6)
MONOCYTES # BLD AUTO: 0.99 K/UL — HIGH (ref 0.1–0.6)
MONOCYTES # BLD AUTO: 1.01 K/UL — HIGH (ref 0.1–0.6)
MONOCYTES # BLD AUTO: 1.08 K/UL — HIGH (ref 0.1–0.6)
MONOCYTES # BLD AUTO: 1.1 K/UL — HIGH (ref 0.1–0.6)
MONOCYTES # BLD AUTO: 1.13 K/UL — HIGH (ref 0.1–0.6)
MONOCYTES NFR BLD AUTO: 1.8 % — SIGNIFICANT CHANGE UP (ref 1.7–9.3)
MONOCYTES NFR BLD AUTO: 4.3 % — SIGNIFICANT CHANGE UP (ref 1.7–9.3)
MONOCYTES NFR BLD AUTO: 5.1 % — SIGNIFICANT CHANGE UP (ref 1.7–9.3)
MONOCYTES NFR BLD AUTO: 5.2 % — SIGNIFICANT CHANGE UP (ref 1.7–9.3)
MONOCYTES NFR BLD AUTO: 5.7 % — SIGNIFICANT CHANGE UP (ref 1.7–9.3)
MONOCYTES NFR BLD AUTO: 5.7 % — SIGNIFICANT CHANGE UP (ref 1.7–9.3)
MONOCYTES NFR BLD AUTO: 6 % — SIGNIFICANT CHANGE UP (ref 1.7–9.3)
MONOCYTES NFR BLD AUTO: 6.2 % — SIGNIFICANT CHANGE UP (ref 1.7–9.3)
MONOCYTES NFR BLD AUTO: 6.3 % — SIGNIFICANT CHANGE UP (ref 1.7–9.3)
MONOCYTES NFR BLD AUTO: 6.4 % — SIGNIFICANT CHANGE UP (ref 1.7–9.3)
MONOCYTES NFR BLD AUTO: 6.4 % — SIGNIFICANT CHANGE UP (ref 1.7–9.3)
MONOCYTES NFR BLD AUTO: 6.5 % — SIGNIFICANT CHANGE UP (ref 1.7–9.3)
MONOCYTES NFR BLD AUTO: 6.5 % — SIGNIFICANT CHANGE UP (ref 1.7–9.3)
MONOCYTES NFR BLD AUTO: 6.6 % — SIGNIFICANT CHANGE UP (ref 1.7–9.3)
MONOCYTES NFR BLD AUTO: 6.8 % — SIGNIFICANT CHANGE UP (ref 1.7–9.3)
MONOCYTES NFR BLD AUTO: 6.9 % — SIGNIFICANT CHANGE UP (ref 1.7–9.3)
MONOCYTES NFR BLD AUTO: 7 % — SIGNIFICANT CHANGE UP (ref 1.7–9.3)
MONOCYTES NFR BLD AUTO: 7.2 % — SIGNIFICANT CHANGE UP (ref 1.7–9.3)
MONOCYTES NFR BLD AUTO: 7.3 % — SIGNIFICANT CHANGE UP (ref 1.7–9.3)
MONOCYTES NFR BLD AUTO: 7.4 % — SIGNIFICANT CHANGE UP (ref 1.7–9.3)
MONOCYTES NFR BLD AUTO: 7.5 % — SIGNIFICANT CHANGE UP (ref 1.7–9.3)
MONOCYTES NFR BLD AUTO: 7.6 % — SIGNIFICANT CHANGE UP (ref 1.7–9.3)
MONOCYTES NFR BLD AUTO: 7.7 % — SIGNIFICANT CHANGE UP (ref 1.7–9.3)
MONOCYTES NFR BLD AUTO: 7.8 % — SIGNIFICANT CHANGE UP (ref 1.7–9.3)
MONOCYTES NFR BLD AUTO: 8 % — SIGNIFICANT CHANGE UP (ref 1.7–9.3)
MONOCYTES NFR BLD AUTO: 8.1 % — SIGNIFICANT CHANGE UP (ref 1.7–9.3)
MONOCYTES NFR BLD AUTO: 8.1 % — SIGNIFICANT CHANGE UP (ref 1.7–9.3)
MONOCYTES NFR BLD AUTO: 8.6 % — SIGNIFICANT CHANGE UP (ref 1.7–9.3)
MONOCYTES NFR BLD AUTO: 8.8 % — SIGNIFICANT CHANGE UP (ref 1.7–9.3)
MONOCYTES NFR BLD AUTO: 8.9 % — SIGNIFICANT CHANGE UP (ref 1.7–9.3)
MONOCYTES NFR BLD AUTO: 8.9 % — SIGNIFICANT CHANGE UP (ref 1.7–9.3)
MONOCYTES NFR BLD AUTO: 9.8 % — HIGH (ref 1.7–9.3)
MRSA PCR RESULT.: NEGATIVE — SIGNIFICANT CHANGE UP
MRSA PCR RESULT.: NEGATIVE — SIGNIFICANT CHANGE UP
NEUTROPHILS # BLD AUTO: 10.59 K/UL — HIGH (ref 1.4–6.5)
NEUTROPHILS # BLD AUTO: 10.64 K/UL — HIGH (ref 1.4–6.5)
NEUTROPHILS # BLD AUTO: 10.64 K/UL — HIGH (ref 1.4–6.5)
NEUTROPHILS # BLD AUTO: 10.9 K/UL — HIGH (ref 1.4–6.5)
NEUTROPHILS # BLD AUTO: 11.12 K/UL — HIGH (ref 1.4–6.5)
NEUTROPHILS # BLD AUTO: 11.75 K/UL — HIGH (ref 1.4–6.5)
NEUTROPHILS # BLD AUTO: 4.52 K/UL — SIGNIFICANT CHANGE UP (ref 1.4–6.5)
NEUTROPHILS # BLD AUTO: 5.76 K/UL — SIGNIFICANT CHANGE UP (ref 1.4–6.5)
NEUTROPHILS # BLD AUTO: 6.38 K/UL — SIGNIFICANT CHANGE UP (ref 1.4–6.5)
NEUTROPHILS # BLD AUTO: 7.02 K/UL — HIGH (ref 1.4–6.5)
NEUTROPHILS # BLD AUTO: 7.08 K/UL — HIGH (ref 1.4–6.5)
NEUTROPHILS # BLD AUTO: 7.34 K/UL — HIGH (ref 1.4–6.5)
NEUTROPHILS # BLD AUTO: 7.37 K/UL — HIGH (ref 1.4–6.5)
NEUTROPHILS # BLD AUTO: 7.5 K/UL — HIGH (ref 1.4–6.5)
NEUTROPHILS # BLD AUTO: 7.53 K/UL — HIGH (ref 1.4–6.5)
NEUTROPHILS # BLD AUTO: 7.63 K/UL — HIGH (ref 1.4–6.5)
NEUTROPHILS # BLD AUTO: 7.8 K/UL — HIGH (ref 1.4–6.5)
NEUTROPHILS # BLD AUTO: 7.88 K/UL — HIGH (ref 1.4–6.5)
NEUTROPHILS # BLD AUTO: 7.93 K/UL — HIGH (ref 1.4–6.5)
NEUTROPHILS # BLD AUTO: 8 K/UL — HIGH (ref 1.4–6.5)
NEUTROPHILS # BLD AUTO: 8.28 K/UL — HIGH (ref 1.4–6.5)
NEUTROPHILS # BLD AUTO: 8.31 K/UL — HIGH (ref 1.4–6.5)
NEUTROPHILS # BLD AUTO: 8.32 K/UL — HIGH (ref 1.4–6.5)
NEUTROPHILS # BLD AUTO: 8.41 K/UL — HIGH (ref 1.4–6.5)
NEUTROPHILS # BLD AUTO: 8.66 K/UL — HIGH (ref 1.4–6.5)
NEUTROPHILS # BLD AUTO: 8.86 K/UL — HIGH (ref 1.4–6.5)
NEUTROPHILS # BLD AUTO: 8.87 K/UL — HIGH (ref 1.4–6.5)
NEUTROPHILS # BLD AUTO: 9.06 K/UL — HIGH (ref 1.4–6.5)
NEUTROPHILS # BLD AUTO: 9.06 K/UL — HIGH (ref 1.4–6.5)
NEUTROPHILS # BLD AUTO: 9.11 K/UL — HIGH (ref 1.4–6.5)
NEUTROPHILS # BLD AUTO: 9.11 K/UL — HIGH (ref 1.4–6.5)
NEUTROPHILS # BLD AUTO: 9.13 K/UL — HIGH (ref 1.4–6.5)
NEUTROPHILS # BLD AUTO: 9.18 K/UL — HIGH (ref 1.4–6.5)
NEUTROPHILS # BLD AUTO: 9.19 K/UL — HIGH (ref 1.4–6.5)
NEUTROPHILS NFR BLD AUTO: 60.3 % — SIGNIFICANT CHANGE UP (ref 42.2–75.2)
NEUTROPHILS NFR BLD AUTO: 61.9 % — SIGNIFICANT CHANGE UP (ref 42.2–75.2)
NEUTROPHILS NFR BLD AUTO: 64.6 % — SIGNIFICANT CHANGE UP (ref 42.2–75.2)
NEUTROPHILS NFR BLD AUTO: 65.9 % — SIGNIFICANT CHANGE UP (ref 42.2–75.2)
NEUTROPHILS NFR BLD AUTO: 67 % — SIGNIFICANT CHANGE UP (ref 42.2–75.2)
NEUTROPHILS NFR BLD AUTO: 67.5 % — SIGNIFICANT CHANGE UP (ref 42.2–75.2)
NEUTROPHILS NFR BLD AUTO: 68.9 % — SIGNIFICANT CHANGE UP (ref 42.2–75.2)
NEUTROPHILS NFR BLD AUTO: 68.9 % — SIGNIFICANT CHANGE UP (ref 42.2–75.2)
NEUTROPHILS NFR BLD AUTO: 70.3 % — SIGNIFICANT CHANGE UP (ref 42.2–75.2)
NEUTROPHILS NFR BLD AUTO: 70.5 % — SIGNIFICANT CHANGE UP (ref 42.2–75.2)
NEUTROPHILS NFR BLD AUTO: 71.4 % — SIGNIFICANT CHANGE UP (ref 42.2–75.2)
NEUTROPHILS NFR BLD AUTO: 71.4 % — SIGNIFICANT CHANGE UP (ref 42.2–75.2)
NEUTROPHILS NFR BLD AUTO: 71.6 % — SIGNIFICANT CHANGE UP (ref 42.2–75.2)
NEUTROPHILS NFR BLD AUTO: 72.1 % — SIGNIFICANT CHANGE UP (ref 42.2–75.2)
NEUTROPHILS NFR BLD AUTO: 72.7 % — SIGNIFICANT CHANGE UP (ref 42.2–75.2)
NEUTROPHILS NFR BLD AUTO: 74.6 % — SIGNIFICANT CHANGE UP (ref 42.2–75.2)
NEUTROPHILS NFR BLD AUTO: 75.1 % — SIGNIFICANT CHANGE UP (ref 42.2–75.2)
NEUTROPHILS NFR BLD AUTO: 75.2 % — SIGNIFICANT CHANGE UP (ref 42.2–75.2)
NEUTROPHILS NFR BLD AUTO: 75.3 % — HIGH (ref 42.2–75.2)
NEUTROPHILS NFR BLD AUTO: 75.4 % — HIGH (ref 42.2–75.2)
NEUTROPHILS NFR BLD AUTO: 76.4 % — HIGH (ref 42.2–75.2)
NEUTROPHILS NFR BLD AUTO: 77.2 % — HIGH (ref 42.2–75.2)
NEUTROPHILS NFR BLD AUTO: 78.5 % — HIGH (ref 42.2–75.2)
NEUTROPHILS NFR BLD AUTO: 78.8 % — HIGH (ref 42.2–75.2)
NEUTROPHILS NFR BLD AUTO: 80.1 % — HIGH (ref 42.2–75.2)
NEUTROPHILS NFR BLD AUTO: 81.3 % — HIGH (ref 42.2–75.2)
NEUTROPHILS NFR BLD AUTO: 81.4 % — HIGH (ref 42.2–75.2)
NEUTROPHILS NFR BLD AUTO: 82.4 % — HIGH (ref 42.2–75.2)
NEUTROPHILS NFR BLD AUTO: 83.7 % — HIGH (ref 42.2–75.2)
NEUTROPHILS NFR BLD AUTO: 85.2 % — HIGH (ref 42.2–75.2)
NEUTROPHILS NFR BLD AUTO: 88.1 % — HIGH (ref 42.2–75.2)
NEUTROPHILS NFR BLD AUTO: 88.9 % — HIGH (ref 42.2–75.2)
NITRITE UR-MCNC: NEGATIVE — SIGNIFICANT CHANGE UP
NON HDL CHOLESTEROL: 30 MG/DL — SIGNIFICANT CHANGE UP
NRBC # BLD: 0 /100 WBCS — SIGNIFICANT CHANGE UP (ref 0–0)
NT-PROBNP SERPL-SCNC: HIGH PG/ML (ref 0–300)
OB PNL STL: POSITIVE
ORGANISM # SPEC MICROSCOPIC CNT: SIGNIFICANT CHANGE UP
OSMOLALITY UR: 347 MOS/KG — SIGNIFICANT CHANGE UP (ref 50–1200)
PCO2 BLDA: 45 MMHG — SIGNIFICANT CHANGE UP (ref 35–48)
PCO2 BLDA: 45 MMHG — SIGNIFICANT CHANGE UP (ref 35–48)
PCO2 BLDA: 50 MMHG — HIGH (ref 35–48)
PCO2 BLDA: 52 MMHG — HIGH (ref 35–48)
PH BLDA: 7.32 — LOW (ref 7.35–7.45)
PH BLDA: 7.34 — LOW (ref 7.35–7.45)
PH BLDA: 7.39 — SIGNIFICANT CHANGE UP (ref 7.35–7.45)
PH BLDA: 7.4 — SIGNIFICANT CHANGE UP (ref 7.35–7.45)
PH UR: 5.5 — SIGNIFICANT CHANGE UP (ref 5–8)
PH UR: 8 — SIGNIFICANT CHANGE UP (ref 5–8)
PH UR: 8 — SIGNIFICANT CHANGE UP (ref 5–8)
PHOSPHATE SERPL-MCNC: 0.8 MG/DL — LOW (ref 2.1–4.9)
PHOSPHATE SERPL-MCNC: 1.4 MG/DL — LOW (ref 2.1–4.9)
PHOSPHATE SERPL-MCNC: 1.4 MG/DL — LOW (ref 2.1–4.9)
PHOSPHATE SERPL-MCNC: 1.6 MG/DL — LOW (ref 2.1–4.9)
PHOSPHATE SERPL-MCNC: 1.7 MG/DL — LOW (ref 2.1–4.9)
PHOSPHATE SERPL-MCNC: 1.7 MG/DL — LOW (ref 2.1–4.9)
PHOSPHATE SERPL-MCNC: 1.8 MG/DL — LOW (ref 2.1–4.9)
PHOSPHATE SERPL-MCNC: 1.9 MG/DL — LOW (ref 2.1–4.9)
PHOSPHATE SERPL-MCNC: 2 MG/DL — LOW (ref 2.1–4.9)
PHOSPHATE SERPL-MCNC: 2.1 MG/DL — SIGNIFICANT CHANGE UP (ref 2.1–4.9)
PHOSPHATE SERPL-MCNC: 2.2 MG/DL — SIGNIFICANT CHANGE UP (ref 2.1–4.9)
PHOSPHATE SERPL-MCNC: 2.3 MG/DL — SIGNIFICANT CHANGE UP (ref 2.1–4.9)
PHOSPHATE SERPL-MCNC: 2.6 MG/DL — SIGNIFICANT CHANGE UP (ref 2.1–4.9)
PHOSPHATE SERPL-MCNC: 2.8 MG/DL — SIGNIFICANT CHANGE UP (ref 2.1–4.9)
PHOSPHATE SERPL-MCNC: 2.8 MG/DL — SIGNIFICANT CHANGE UP (ref 2.1–4.9)
PHOSPHATE SERPL-MCNC: 3 MG/DL — SIGNIFICANT CHANGE UP (ref 2.1–4.9)
PHOSPHATE SERPL-MCNC: 3 MG/DL — SIGNIFICANT CHANGE UP (ref 2.1–4.9)
PHOSPHATE SERPL-MCNC: 3.1 MG/DL — SIGNIFICANT CHANGE UP (ref 2.1–4.9)
PHOSPHATE SERPL-MCNC: 3.2 MG/DL — SIGNIFICANT CHANGE UP (ref 2.1–4.9)
PHOSPHATE SERPL-MCNC: 3.4 MG/DL — SIGNIFICANT CHANGE UP (ref 2.1–4.9)
PLATELET # BLD AUTO: 191 K/UL — SIGNIFICANT CHANGE UP (ref 130–400)
PLATELET # BLD AUTO: 209 K/UL — SIGNIFICANT CHANGE UP (ref 130–400)
PLATELET # BLD AUTO: 249 K/UL — SIGNIFICANT CHANGE UP (ref 130–400)
PLATELET # BLD AUTO: 259 K/UL — SIGNIFICANT CHANGE UP (ref 130–400)
PLATELET # BLD AUTO: 265 K/UL — SIGNIFICANT CHANGE UP (ref 130–400)
PLATELET # BLD AUTO: 272 K/UL — SIGNIFICANT CHANGE UP (ref 130–400)
PLATELET # BLD AUTO: 274 K/UL — SIGNIFICANT CHANGE UP (ref 130–400)
PLATELET # BLD AUTO: 287 K/UL — SIGNIFICANT CHANGE UP (ref 130–400)
PLATELET # BLD AUTO: 287 K/UL — SIGNIFICANT CHANGE UP (ref 130–400)
PLATELET # BLD AUTO: 294 K/UL — SIGNIFICANT CHANGE UP (ref 130–400)
PLATELET # BLD AUTO: 296 K/UL — SIGNIFICANT CHANGE UP (ref 130–400)
PLATELET # BLD AUTO: 299 K/UL — SIGNIFICANT CHANGE UP (ref 130–400)
PLATELET # BLD AUTO: 308 K/UL — SIGNIFICANT CHANGE UP (ref 130–400)
PLATELET # BLD AUTO: 309 K/UL — SIGNIFICANT CHANGE UP (ref 130–400)
PLATELET # BLD AUTO: 310 K/UL — SIGNIFICANT CHANGE UP (ref 130–400)
PLATELET # BLD AUTO: 310 K/UL — SIGNIFICANT CHANGE UP (ref 130–400)
PLATELET # BLD AUTO: 312 K/UL — SIGNIFICANT CHANGE UP (ref 130–400)
PLATELET # BLD AUTO: 314 K/UL — SIGNIFICANT CHANGE UP (ref 130–400)
PLATELET # BLD AUTO: 325 K/UL — SIGNIFICANT CHANGE UP (ref 130–400)
PLATELET # BLD AUTO: 327 K/UL — SIGNIFICANT CHANGE UP (ref 130–400)
PLATELET # BLD AUTO: 329 K/UL — SIGNIFICANT CHANGE UP (ref 130–400)
PLATELET # BLD AUTO: 330 K/UL — SIGNIFICANT CHANGE UP (ref 130–400)
PLATELET # BLD AUTO: 335 K/UL — SIGNIFICANT CHANGE UP (ref 130–400)
PLATELET # BLD AUTO: 338 K/UL — SIGNIFICANT CHANGE UP (ref 130–400)
PLATELET # BLD AUTO: 339 K/UL — SIGNIFICANT CHANGE UP (ref 130–400)
PLATELET # BLD AUTO: 340 K/UL — SIGNIFICANT CHANGE UP (ref 130–400)
PLATELET # BLD AUTO: 341 K/UL — SIGNIFICANT CHANGE UP (ref 130–400)
PLATELET # BLD AUTO: 365 K/UL — SIGNIFICANT CHANGE UP (ref 130–400)
PLATELET # BLD AUTO: 367 K/UL — SIGNIFICANT CHANGE UP (ref 130–400)
PLATELET # BLD AUTO: 368 K/UL — SIGNIFICANT CHANGE UP (ref 130–400)
PLATELET # BLD AUTO: 370 K/UL — SIGNIFICANT CHANGE UP (ref 130–400)
PLATELET # BLD AUTO: 377 K/UL — SIGNIFICANT CHANGE UP (ref 130–400)
PLATELET # BLD AUTO: 379 K/UL — SIGNIFICANT CHANGE UP (ref 130–400)
PLATELET # BLD AUTO: 390 K/UL — SIGNIFICANT CHANGE UP (ref 130–400)
PLATELET # BLD AUTO: 390 K/UL — SIGNIFICANT CHANGE UP (ref 130–400)
PLATELET # BLD AUTO: 394 K/UL — SIGNIFICANT CHANGE UP (ref 130–400)
PLATELET # BLD AUTO: 395 K/UL — SIGNIFICANT CHANGE UP (ref 130–400)
PLATELET # BLD AUTO: 401 K/UL — HIGH (ref 130–400)
PLATELET # BLD AUTO: 403 K/UL — HIGH (ref 130–400)
PLATELET # BLD AUTO: 418 K/UL — HIGH (ref 130–400)
PLATELET # BLD AUTO: 420 K/UL — HIGH (ref 130–400)
PLATELET # BLD AUTO: 424 K/UL — HIGH (ref 130–400)
PLATELET # BLD AUTO: 429 K/UL — HIGH (ref 130–400)
PLATELET # BLD AUTO: 437 K/UL — HIGH (ref 130–400)
PLATELET # BLD AUTO: 438 K/UL — HIGH (ref 130–400)
PLATELET # BLD AUTO: 446 K/UL — HIGH (ref 130–400)
PLATELET # BLD AUTO: 499 K/UL — HIGH (ref 130–400)
PLATELET # BLD AUTO: 509 K/UL — HIGH (ref 130–400)
PLATELET # BLD AUTO: 512 K/UL — HIGH (ref 130–400)
PLATELET # BLD AUTO: 535 K/UL — HIGH (ref 130–400)
PLATELET # BLD AUTO: 571 K/UL — HIGH (ref 130–400)
PLATELET # BLD AUTO: 585 K/UL — HIGH (ref 130–400)
PLATELET # BLD AUTO: 591 K/UL — HIGH (ref 130–400)
PLATELET # BLD AUTO: 612 K/UL — HIGH (ref 130–400)
PLATELET # BLD AUTO: 616 K/UL — HIGH (ref 130–400)
PLATELET # BLD AUTO: 617 K/UL — HIGH (ref 130–400)
PLATELET # BLD AUTO: 623 K/UL — HIGH (ref 130–400)
PLATELET # BLD AUTO: 644 K/UL — HIGH (ref 130–400)
PLATELET # BLD AUTO: 671 K/UL — HIGH (ref 130–400)
PMV BLD: 10 FL — SIGNIFICANT CHANGE UP (ref 7.4–10.4)
PMV BLD: 10.1 FL — SIGNIFICANT CHANGE UP (ref 7.4–10.4)
PMV BLD: 10.1 FL — SIGNIFICANT CHANGE UP (ref 7.4–10.4)
PMV BLD: 10.3 FL — SIGNIFICANT CHANGE UP (ref 7.4–10.4)
PMV BLD: 10.3 FL — SIGNIFICANT CHANGE UP (ref 7.4–10.4)
PMV BLD: 10.4 FL — SIGNIFICANT CHANGE UP (ref 7.4–10.4)
PMV BLD: 10.5 FL — HIGH (ref 7.4–10.4)
PMV BLD: 10.5 FL — HIGH (ref 7.4–10.4)
PMV BLD: 10.6 FL — HIGH (ref 7.4–10.4)
PMV BLD: 10.6 FL — HIGH (ref 7.4–10.4)
PMV BLD: 10.8 FL — HIGH (ref 7.4–10.4)
PMV BLD: 10.9 FL — HIGH (ref 7.4–10.4)
PMV BLD: 11 FL — HIGH (ref 7.4–10.4)
PMV BLD: 11.2 FL — HIGH (ref 7.4–10.4)
PMV BLD: 8.7 FL — SIGNIFICANT CHANGE UP (ref 7.4–10.4)
PMV BLD: 8.8 FL — SIGNIFICANT CHANGE UP (ref 7.4–10.4)
PMV BLD: 8.9 FL — SIGNIFICANT CHANGE UP (ref 7.4–10.4)
PMV BLD: 9 FL — SIGNIFICANT CHANGE UP (ref 7.4–10.4)
PMV BLD: 9.1 FL — SIGNIFICANT CHANGE UP (ref 7.4–10.4)
PMV BLD: 9.2 FL — SIGNIFICANT CHANGE UP (ref 7.4–10.4)
PMV BLD: 9.3 FL — SIGNIFICANT CHANGE UP (ref 7.4–10.4)
PMV BLD: 9.4 FL — SIGNIFICANT CHANGE UP (ref 7.4–10.4)
PMV BLD: 9.5 FL — SIGNIFICANT CHANGE UP (ref 7.4–10.4)
PMV BLD: 9.5 FL — SIGNIFICANT CHANGE UP (ref 7.4–10.4)
PMV BLD: 9.6 FL — SIGNIFICANT CHANGE UP (ref 7.4–10.4)
PMV BLD: 9.7 FL — SIGNIFICANT CHANGE UP (ref 7.4–10.4)
PMV BLD: 9.8 FL — SIGNIFICANT CHANGE UP (ref 7.4–10.4)
PMV BLD: 9.9 FL — SIGNIFICANT CHANGE UP (ref 7.4–10.4)
PMV BLD: 9.9 FL — SIGNIFICANT CHANGE UP (ref 7.4–10.4)
PO2 BLDA: 103 MMHG — SIGNIFICANT CHANGE UP (ref 83–108)
PO2 BLDA: 107 MMHG — SIGNIFICANT CHANGE UP (ref 83–108)
PO2 BLDA: 127 MMHG — HIGH (ref 83–108)
PO2 BLDA: 137 MMHG — HIGH (ref 83–108)
POTASSIUM SERPL-MCNC: 3.2 MMOL/L — LOW (ref 3.5–5)
POTASSIUM SERPL-MCNC: 3.3 MMOL/L — LOW (ref 3.5–5)
POTASSIUM SERPL-MCNC: 3.4 MMOL/L — LOW (ref 3.5–5)
POTASSIUM SERPL-MCNC: 3.5 MMOL/L — SIGNIFICANT CHANGE UP (ref 3.5–5)
POTASSIUM SERPL-MCNC: 3.6 MMOL/L — SIGNIFICANT CHANGE UP (ref 3.5–5)
POTASSIUM SERPL-MCNC: 3.7 MMOL/L — SIGNIFICANT CHANGE UP (ref 3.5–5)
POTASSIUM SERPL-MCNC: 3.7 MMOL/L — SIGNIFICANT CHANGE UP (ref 3.5–5)
POTASSIUM SERPL-MCNC: 3.8 MMOL/L — SIGNIFICANT CHANGE UP (ref 3.5–5)
POTASSIUM SERPL-MCNC: 3.8 MMOL/L — SIGNIFICANT CHANGE UP (ref 3.5–5)
POTASSIUM SERPL-MCNC: 3.9 MMOL/L — SIGNIFICANT CHANGE UP (ref 3.5–5)
POTASSIUM SERPL-MCNC: 4 MMOL/L — SIGNIFICANT CHANGE UP (ref 3.5–5)
POTASSIUM SERPL-MCNC: 4.2 MMOL/L — SIGNIFICANT CHANGE UP (ref 3.5–5)
POTASSIUM SERPL-MCNC: 4.3 MMOL/L — SIGNIFICANT CHANGE UP (ref 3.5–5)
POTASSIUM SERPL-MCNC: 4.4 MMOL/L — SIGNIFICANT CHANGE UP (ref 3.5–5)
POTASSIUM SERPL-MCNC: 4.4 MMOL/L — SIGNIFICANT CHANGE UP (ref 3.5–5)
POTASSIUM SERPL-MCNC: 4.5 MMOL/L — SIGNIFICANT CHANGE UP (ref 3.5–5)
POTASSIUM SERPL-MCNC: 4.6 MMOL/L — SIGNIFICANT CHANGE UP (ref 3.5–5)
POTASSIUM SERPL-MCNC: 4.7 MMOL/L — SIGNIFICANT CHANGE UP (ref 3.5–5)
POTASSIUM SERPL-MCNC: 4.8 MMOL/L — SIGNIFICANT CHANGE UP (ref 3.5–5)
POTASSIUM SERPL-MCNC: 4.9 MMOL/L — SIGNIFICANT CHANGE UP (ref 3.5–5)
POTASSIUM SERPL-MCNC: 5 MMOL/L — SIGNIFICANT CHANGE UP (ref 3.5–5)
POTASSIUM SERPL-MCNC: 5.1 MMOL/L — HIGH (ref 3.5–5)
POTASSIUM SERPL-MCNC: 5.1 MMOL/L — HIGH (ref 3.5–5)
POTASSIUM SERPL-MCNC: 5.2 MMOL/L — HIGH (ref 3.5–5)
POTASSIUM SERPL-MCNC: 5.2 MMOL/L — HIGH (ref 3.5–5)
POTASSIUM SERPL-MCNC: 5.3 MMOL/L — HIGH (ref 3.5–5)
POTASSIUM SERPL-MCNC: 5.4 MMOL/L — HIGH (ref 3.5–5)
POTASSIUM SERPL-MCNC: 5.5 MMOL/L — HIGH (ref 3.5–5)
POTASSIUM SERPL-MCNC: 5.6 MMOL/L — HIGH (ref 3.5–5)
POTASSIUM SERPL-MCNC: 5.7 MMOL/L — HIGH (ref 3.5–5)
POTASSIUM SERPL-MCNC: 5.7 MMOL/L — HIGH (ref 3.5–5)
POTASSIUM SERPL-MCNC: 6 MMOL/L — CRITICAL HIGH (ref 3.5–5)
POTASSIUM SERPL-MCNC: 6.2 MMOL/L — CRITICAL HIGH (ref 3.5–5)
POTASSIUM SERPL-SCNC: 3.2 MMOL/L — LOW (ref 3.5–5)
POTASSIUM SERPL-SCNC: 3.3 MMOL/L — LOW (ref 3.5–5)
POTASSIUM SERPL-SCNC: 3.4 MMOL/L — LOW (ref 3.5–5)
POTASSIUM SERPL-SCNC: 3.5 MMOL/L — SIGNIFICANT CHANGE UP (ref 3.5–5)
POTASSIUM SERPL-SCNC: 3.6 MMOL/L — SIGNIFICANT CHANGE UP (ref 3.5–5)
POTASSIUM SERPL-SCNC: 3.7 MMOL/L — SIGNIFICANT CHANGE UP (ref 3.5–5)
POTASSIUM SERPL-SCNC: 3.7 MMOL/L — SIGNIFICANT CHANGE UP (ref 3.5–5)
POTASSIUM SERPL-SCNC: 3.8 MMOL/L — SIGNIFICANT CHANGE UP (ref 3.5–5)
POTASSIUM SERPL-SCNC: 3.8 MMOL/L — SIGNIFICANT CHANGE UP (ref 3.5–5)
POTASSIUM SERPL-SCNC: 3.9 MMOL/L — SIGNIFICANT CHANGE UP (ref 3.5–5)
POTASSIUM SERPL-SCNC: 4 MMOL/L — SIGNIFICANT CHANGE UP (ref 3.5–5)
POTASSIUM SERPL-SCNC: 4.2 MMOL/L — SIGNIFICANT CHANGE UP (ref 3.5–5)
POTASSIUM SERPL-SCNC: 4.3 MMOL/L — SIGNIFICANT CHANGE UP (ref 3.5–5)
POTASSIUM SERPL-SCNC: 4.4 MMOL/L — SIGNIFICANT CHANGE UP (ref 3.5–5)
POTASSIUM SERPL-SCNC: 4.4 MMOL/L — SIGNIFICANT CHANGE UP (ref 3.5–5)
POTASSIUM SERPL-SCNC: 4.5 MMOL/L — SIGNIFICANT CHANGE UP (ref 3.5–5)
POTASSIUM SERPL-SCNC: 4.6 MMOL/L — SIGNIFICANT CHANGE UP (ref 3.5–5)
POTASSIUM SERPL-SCNC: 4.7 MMOL/L — SIGNIFICANT CHANGE UP (ref 3.5–5)
POTASSIUM SERPL-SCNC: 4.8 MMOL/L — SIGNIFICANT CHANGE UP (ref 3.5–5)
POTASSIUM SERPL-SCNC: 4.9 MMOL/L — SIGNIFICANT CHANGE UP (ref 3.5–5)
POTASSIUM SERPL-SCNC: 5 MMOL/L — SIGNIFICANT CHANGE UP (ref 3.5–5)
POTASSIUM SERPL-SCNC: 5.1 MMOL/L — HIGH (ref 3.5–5)
POTASSIUM SERPL-SCNC: 5.1 MMOL/L — HIGH (ref 3.5–5)
POTASSIUM SERPL-SCNC: 5.2 MMOL/L — HIGH (ref 3.5–5)
POTASSIUM SERPL-SCNC: 5.2 MMOL/L — HIGH (ref 3.5–5)
POTASSIUM SERPL-SCNC: 5.3 MMOL/L — HIGH (ref 3.5–5)
POTASSIUM SERPL-SCNC: 5.4 MMOL/L — HIGH (ref 3.5–5)
POTASSIUM SERPL-SCNC: 5.5 MMOL/L — HIGH (ref 3.5–5)
POTASSIUM SERPL-SCNC: 5.6 MMOL/L — HIGH (ref 3.5–5)
POTASSIUM SERPL-SCNC: 5.7 MMOL/L — HIGH (ref 3.5–5)
POTASSIUM SERPL-SCNC: 5.7 MMOL/L — HIGH (ref 3.5–5)
POTASSIUM SERPL-SCNC: 6 MMOL/L — CRITICAL HIGH (ref 3.5–5)
POTASSIUM SERPL-SCNC: 6.2 MMOL/L — CRITICAL HIGH (ref 3.5–5)
PROCALCITONIN SERPL-MCNC: 0.47 NG/ML — HIGH (ref 0.02–0.1)
PROCALCITONIN SERPL-MCNC: 1.12 NG/ML — HIGH (ref 0.02–0.1)
PROCALCITONIN SERPL-MCNC: 1.38 NG/ML — HIGH (ref 0.02–0.1)
PROCALCITONIN SERPL-MCNC: 2.38 NG/ML — HIGH (ref 0.02–0.1)
PROINSULIN SERPL-MCNC: 79.3 PMOL/L — HIGH (ref 0–10)
PROT SERPL-MCNC: 4.4 G/DL — LOW (ref 6–8)
PROT SERPL-MCNC: 4.8 G/DL — LOW (ref 6–8)
PROT SERPL-MCNC: 4.8 G/DL — LOW (ref 6–8)
PROT SERPL-MCNC: 4.9 G/DL — LOW (ref 6–8)
PROT SERPL-MCNC: 5 G/DL — LOW (ref 6–8)
PROT SERPL-MCNC: 5.1 G/DL — LOW (ref 6–8)
PROT SERPL-MCNC: 5.1 G/DL — LOW (ref 6–8)
PROT SERPL-MCNC: 5.2 G/DL — LOW (ref 6–8)
PROT SERPL-MCNC: 5.3 G/DL — LOW (ref 6–8)
PROT SERPL-MCNC: 5.4 G/DL — LOW (ref 6–8)
PROT SERPL-MCNC: 5.5 G/DL — LOW (ref 6–8)
PROT SERPL-MCNC: 5.5 G/DL — LOW (ref 6–8)
PROT SERPL-MCNC: 5.6 G/DL — LOW (ref 6–8)
PROT SERPL-MCNC: 5.6 G/DL — LOW (ref 6–8)
PROT SERPL-MCNC: 5.7 G/DL — LOW (ref 6–8)
PROT SERPL-MCNC: 5.7 G/DL — LOW (ref 6–8)
PROT SERPL-MCNC: 5.9 G/DL — LOW (ref 6–8)
PROT SERPL-MCNC: 6.1 G/DL — SIGNIFICANT CHANGE UP (ref 6–8)
PROT SERPL-MCNC: 6.2 G/DL — SIGNIFICANT CHANGE UP (ref 6–8)
PROT UR-MCNC: ABNORMAL
PROTHROM AB SERPL-ACNC: 11.3 SEC — SIGNIFICANT CHANGE UP (ref 9.95–12.87)
PROTHROM AB SERPL-ACNC: 12.4 SEC — SIGNIFICANT CHANGE UP (ref 9.95–12.87)
PROTHROM AB SERPL-ACNC: 14.1 SEC — HIGH (ref 9.95–12.87)
PROTHROM AB SERPL-ACNC: 16.1 SEC — HIGH (ref 9.95–12.87)
PTH-INTACT FLD-MCNC: 18 PG/ML — SIGNIFICANT CHANGE UP (ref 15–65)
PTH-INTACT FLD-MCNC: 20 PG/ML — SIGNIFICANT CHANGE UP (ref 15–65)
RAPID RVP RESULT: SIGNIFICANT CHANGE UP
RBC # BLD: 2.18 M/UL — LOW (ref 4.7–6.1)
RBC # BLD: 2.22 M/UL — LOW (ref 4.7–6.1)
RBC # BLD: 2.25 M/UL — LOW (ref 4.7–6.1)
RBC # BLD: 2.28 M/UL — LOW (ref 4.7–6.1)
RBC # BLD: 2.31 M/UL — LOW (ref 4.7–6.1)
RBC # BLD: 2.33 M/UL — LOW (ref 4.7–6.1)
RBC # BLD: 2.33 M/UL — LOW (ref 4.7–6.1)
RBC # BLD: 2.34 M/UL — LOW (ref 4.7–6.1)
RBC # BLD: 2.34 M/UL — LOW (ref 4.7–6.1)
RBC # BLD: 2.35 M/UL — LOW (ref 4.7–6.1)
RBC # BLD: 2.36 M/UL — LOW (ref 4.7–6.1)
RBC # BLD: 2.37 M/UL — LOW (ref 4.7–6.1)
RBC # BLD: 2.37 M/UL — LOW (ref 4.7–6.1)
RBC # BLD: 2.39 M/UL — LOW (ref 4.7–6.1)
RBC # BLD: 2.4 M/UL — LOW (ref 4.7–6.1)
RBC # BLD: 2.4 M/UL — LOW (ref 4.7–6.1)
RBC # BLD: 2.41 M/UL — LOW (ref 4.7–6.1)
RBC # BLD: 2.41 M/UL — LOW (ref 4.7–6.1)
RBC # BLD: 2.43 M/UL — LOW (ref 4.7–6.1)
RBC # BLD: 2.43 M/UL — LOW (ref 4.7–6.1)
RBC # BLD: 2.45 M/UL — LOW (ref 4.7–6.1)
RBC # BLD: 2.46 M/UL — LOW (ref 4.7–6.1)
RBC # BLD: 2.5 M/UL — LOW (ref 4.7–6.1)
RBC # BLD: 2.51 M/UL — LOW (ref 4.7–6.1)
RBC # BLD: 2.52 M/UL — LOW (ref 4.7–6.1)
RBC # BLD: 2.52 M/UL — LOW (ref 4.7–6.1)
RBC # BLD: 2.55 M/UL — LOW (ref 4.7–6.1)
RBC # BLD: 2.57 M/UL — LOW (ref 4.7–6.1)
RBC # BLD: 2.58 M/UL — LOW (ref 4.7–6.1)
RBC # BLD: 2.59 M/UL — LOW (ref 4.7–6.1)
RBC # BLD: 2.6 M/UL — LOW (ref 4.7–6.1)
RBC # BLD: 2.62 M/UL — LOW (ref 4.7–6.1)
RBC # BLD: 2.64 M/UL — LOW (ref 4.7–6.1)
RBC # BLD: 2.64 M/UL — LOW (ref 4.7–6.1)
RBC # BLD: 2.65 M/UL — LOW (ref 4.7–6.1)
RBC # BLD: 2.65 M/UL — LOW (ref 4.7–6.1)
RBC # BLD: 2.66 M/UL — LOW (ref 4.7–6.1)
RBC # BLD: 2.67 M/UL — LOW (ref 4.7–6.1)
RBC # BLD: 2.68 M/UL — LOW (ref 4.7–6.1)
RBC # BLD: 2.71 M/UL — LOW (ref 4.7–6.1)
RBC # BLD: 2.71 M/UL — LOW (ref 4.7–6.1)
RBC # BLD: 2.72 M/UL — LOW (ref 4.7–6.1)
RBC # BLD: 2.75 M/UL — LOW (ref 4.7–6.1)
RBC # BLD: 2.76 M/UL — LOW (ref 4.7–6.1)
RBC # BLD: 2.82 M/UL — LOW (ref 4.7–6.1)
RBC # BLD: 2.84 M/UL — LOW (ref 4.7–6.1)
RBC # BLD: 2.93 M/UL — LOW (ref 4.7–6.1)
RBC # BLD: 3 M/UL — LOW (ref 4.7–6.1)
RBC # BLD: 3.01 M/UL — LOW (ref 4.7–6.1)
RBC # BLD: 3.1 M/UL — LOW (ref 4.7–6.1)
RBC # BLD: 3.44 M/UL — LOW (ref 4.7–6.1)
RBC # FLD: 14.8 % — HIGH (ref 11.5–14.5)
RBC # FLD: 14.9 % — HIGH (ref 11.5–14.5)
RBC # FLD: 14.9 % — HIGH (ref 11.5–14.5)
RBC # FLD: 15 % — HIGH (ref 11.5–14.5)
RBC # FLD: 15.1 % — HIGH (ref 11.5–14.5)
RBC # FLD: 15.2 % — HIGH (ref 11.5–14.5)
RBC # FLD: 15.2 % — HIGH (ref 11.5–14.5)
RBC # FLD: 15.3 % — HIGH (ref 11.5–14.5)
RBC # FLD: 15.5 % — HIGH (ref 11.5–14.5)
RBC # FLD: 15.6 % — HIGH (ref 11.5–14.5)
RBC # FLD: 15.7 % — HIGH (ref 11.5–14.5)
RBC # FLD: 15.9 % — HIGH (ref 11.5–14.5)
RBC # FLD: 16.2 % — HIGH (ref 11.5–14.5)
RBC # FLD: 16.3 % — HIGH (ref 11.5–14.5)
RBC # FLD: 16.5 % — HIGH (ref 11.5–14.5)
RBC # FLD: 16.6 % — HIGH (ref 11.5–14.5)
RBC # FLD: 16.7 % — HIGH (ref 11.5–14.5)
RBC # FLD: 16.8 % — HIGH (ref 11.5–14.5)
RBC # FLD: 16.8 % — HIGH (ref 11.5–14.5)
RBC # FLD: 16.9 % — HIGH (ref 11.5–14.5)
RBC # FLD: 17 % — HIGH (ref 11.5–14.5)
RBC # FLD: 17 % — HIGH (ref 11.5–14.5)
RBC # FLD: 17.1 % — HIGH (ref 11.5–14.5)
RBC # FLD: 17.1 % — HIGH (ref 11.5–14.5)
RBC # FLD: 17.2 % — HIGH (ref 11.5–14.5)
RBC # FLD: 17.5 % — HIGH (ref 11.5–14.5)
RBC # FLD: 17.6 % — HIGH (ref 11.5–14.5)
RBC # FLD: 17.7 % — HIGH (ref 11.5–14.5)
RBC # FLD: 17.7 % — HIGH (ref 11.5–14.5)
RBC # FLD: 17.8 % — HIGH (ref 11.5–14.5)
RBC # FLD: 17.9 % — HIGH (ref 11.5–14.5)
RBC # FLD: 17.9 % — HIGH (ref 11.5–14.5)
RBC # FLD: 18 % — HIGH (ref 11.5–14.5)
RBC # FLD: 18 % — HIGH (ref 11.5–14.5)
RBC # FLD: 18.1 % — HIGH (ref 11.5–14.5)
RBC # FLD: 18.1 % — HIGH (ref 11.5–14.5)
RBC # FLD: 18.4 % — HIGH (ref 11.5–14.5)
RBC # FLD: 19.2 % — HIGH (ref 11.5–14.5)
RBC CASTS # UR COMP ASSIST: 355 /HPF — HIGH (ref 0–4)
RBC CASTS # UR COMP ASSIST: 4 /HPF — SIGNIFICANT CHANGE UP (ref 0–4)
RBC CASTS # UR COMP ASSIST: 72 /HPF — HIGH (ref 0–4)
RETICS #: 16.6 K/UL — LOW (ref 25–125)
RETICS #: 45.1 K/UL — SIGNIFICANT CHANGE UP (ref 25–125)
RETICS/RBC NFR: 0.6 % — SIGNIFICANT CHANGE UP (ref 0.5–1.5)
RETICS/RBC NFR: 1.9 % — HIGH (ref 0.5–1.5)
SAO2 % BLDA: 98.7 % — HIGH (ref 94–98)
SAO2 % BLDA: 99.1 % — HIGH (ref 94–98)
SAO2 % BLDA: 99.2 % — HIGH (ref 94–98)
SAO2 % BLDA: 99.5 % — HIGH (ref 94–98)
SARS-COV-2 RNA SPEC QL NAA+PROBE: SIGNIFICANT CHANGE UP
SODIUM SERPL-SCNC: 117 MMOL/L — CRITICAL LOW (ref 135–146)
SODIUM SERPL-SCNC: 119 MMOL/L — CRITICAL LOW (ref 135–146)
SODIUM SERPL-SCNC: 119 MMOL/L — CRITICAL LOW (ref 135–146)
SODIUM SERPL-SCNC: 120 MMOL/L — LOW (ref 135–146)
SODIUM SERPL-SCNC: 126 MMOL/L — LOW (ref 135–146)
SODIUM SERPL-SCNC: 127 MMOL/L — LOW (ref 135–146)
SODIUM SERPL-SCNC: 129 MMOL/L — LOW (ref 135–146)
SODIUM SERPL-SCNC: 129 MMOL/L — LOW (ref 135–146)
SODIUM SERPL-SCNC: 131 MMOL/L — LOW (ref 135–146)
SODIUM SERPL-SCNC: 132 MMOL/L — LOW (ref 135–146)
SODIUM SERPL-SCNC: 133 MMOL/L — LOW (ref 135–146)
SODIUM SERPL-SCNC: 134 MMOL/L — LOW (ref 135–146)
SODIUM SERPL-SCNC: 135 MMOL/L — SIGNIFICANT CHANGE UP (ref 135–146)
SODIUM SERPL-SCNC: 136 MMOL/L — SIGNIFICANT CHANGE UP (ref 135–146)
SODIUM SERPL-SCNC: 137 MMOL/L — SIGNIFICANT CHANGE UP (ref 135–146)
SODIUM SERPL-SCNC: 138 MMOL/L — SIGNIFICANT CHANGE UP (ref 135–146)
SODIUM SERPL-SCNC: 139 MMOL/L — SIGNIFICANT CHANGE UP (ref 135–146)
SODIUM SERPL-SCNC: 140 MMOL/L — SIGNIFICANT CHANGE UP (ref 135–146)
SODIUM SERPL-SCNC: 141 MMOL/L — SIGNIFICANT CHANGE UP (ref 135–146)
SODIUM SERPL-SCNC: 142 MMOL/L — SIGNIFICANT CHANGE UP (ref 135–146)
SODIUM UR-SCNC: 96 MMOL/L — SIGNIFICANT CHANGE UP
SP GR SPEC: 1.02 — SIGNIFICANT CHANGE UP (ref 1.01–1.03)
SPECIMEN SOURCE: SIGNIFICANT CHANGE UP
T4 FREE SERPL-MCNC: 1.2 NG/DL — SIGNIFICANT CHANGE UP (ref 0.9–1.8)
T4 FREE SERPL-MCNC: 1.2 NG/DL — SIGNIFICANT CHANGE UP (ref 0.9–1.8)
T4 FREE+ TSH PNL SERPL: 7.98 UIU/ML — HIGH (ref 0.27–4.2)
TIBC SERPL-MCNC: 101 UG/DL — LOW (ref 220–430)
TIBC SERPL-MCNC: 111 UG/DL — LOW (ref 220–430)
TIBC SERPL-MCNC: 138 UG/DL — LOW (ref 220–430)
TRANSFERRIN SERPL-MCNC: 121 MG/DL — LOW (ref 200–360)
TRIGL SERPL-MCNC: 37 MG/DL — SIGNIFICANT CHANGE UP
TROPONIN T SERPL-MCNC: 1.55 NG/ML — CRITICAL HIGH
TROPONIN T SERPL-MCNC: 1.57 NG/ML — CRITICAL HIGH
TROPONIN T SERPL-MCNC: 1.63 NG/ML — CRITICAL HIGH
TROPONIN T SERPL-MCNC: 1.8 NG/ML — CRITICAL HIGH
TSH SERPL-MCNC: 5.37 UIU/ML — HIGH (ref 0.27–4.2)
UIBC SERPL-MCNC: 20 UG/DL — LOW (ref 110–370)
UIBC SERPL-MCNC: 60 UG/DL — LOW (ref 110–370)
UIBC SERPL-MCNC: 89 UG/DL — LOW (ref 110–370)
UROBILINOGEN FLD QL: ABNORMAL
UROBILINOGEN FLD QL: ABNORMAL
UROBILINOGEN FLD QL: SIGNIFICANT CHANGE UP
VALPROATE SERPL-MCNC: 10 UG/ML — LOW (ref 50–100)
VALPROATE SERPL-MCNC: 13 UG/ML — LOW (ref 50–100)
VALPROATE SERPL-MCNC: 22 UG/ML — LOW (ref 50–100)
VALPROATE SERPL-MCNC: 32 UG/ML — LOW (ref 50–100)
VALPROATE SERPL-MCNC: 35 UG/ML — LOW (ref 50–100)
VALPROATE SERPL-MCNC: 40 UG/ML — LOW (ref 50–100)
VANCOMYCIN FLD-MCNC: 12.5 UG/ML — HIGH (ref 5–10)
VIT B12 SERPL-MCNC: 1908 PG/ML — HIGH (ref 232–1245)
VIT D25+D1,25 OH+D1,25 PNL SERPL-MCNC: 18.6 PG/ML — LOW (ref 19.9–79.3)
WBC # BLD: 10.1 K/UL — SIGNIFICANT CHANGE UP (ref 4.8–10.8)
WBC # BLD: 10.22 K/UL — SIGNIFICANT CHANGE UP (ref 4.8–10.8)
WBC # BLD: 10.4 K/UL — SIGNIFICANT CHANGE UP (ref 4.8–10.8)
WBC # BLD: 10.45 K/UL — SIGNIFICANT CHANGE UP (ref 4.8–10.8)
WBC # BLD: 10.47 K/UL — SIGNIFICANT CHANGE UP (ref 4.8–10.8)
WBC # BLD: 10.5 K/UL — SIGNIFICANT CHANGE UP (ref 4.8–10.8)
WBC # BLD: 10.56 K/UL — SIGNIFICANT CHANGE UP (ref 4.8–10.8)
WBC # BLD: 10.57 K/UL — SIGNIFICANT CHANGE UP (ref 4.8–10.8)
WBC # BLD: 10.59 K/UL — SIGNIFICANT CHANGE UP (ref 4.8–10.8)
WBC # BLD: 10.65 K/UL — SIGNIFICANT CHANGE UP (ref 4.8–10.8)
WBC # BLD: 10.86 K/UL — HIGH (ref 4.8–10.8)
WBC # BLD: 10.9 K/UL — HIGH (ref 4.8–10.8)
WBC # BLD: 10.93 K/UL — HIGH (ref 4.8–10.8)
WBC # BLD: 11.14 K/UL — HIGH (ref 4.8–10.8)
WBC # BLD: 11.17 K/UL — HIGH (ref 4.8–10.8)
WBC # BLD: 11.22 K/UL — HIGH (ref 4.8–10.8)
WBC # BLD: 11.37 K/UL — HIGH (ref 4.8–10.8)
WBC # BLD: 11.38 K/UL — HIGH (ref 4.8–10.8)
WBC # BLD: 11.51 K/UL — HIGH (ref 4.8–10.8)
WBC # BLD: 11.6 K/UL — HIGH (ref 4.8–10.8)
WBC # BLD: 11.62 K/UL — HIGH (ref 4.8–10.8)
WBC # BLD: 11.77 K/UL — HIGH (ref 4.8–10.8)
WBC # BLD: 11.79 K/UL — HIGH (ref 4.8–10.8)
WBC # BLD: 11.84 K/UL — HIGH (ref 4.8–10.8)
WBC # BLD: 11.87 K/UL — HIGH (ref 4.8–10.8)
WBC # BLD: 11.9 K/UL — HIGH (ref 4.8–10.8)
WBC # BLD: 12.06 K/UL — HIGH (ref 4.8–10.8)
WBC # BLD: 12.29 K/UL — HIGH (ref 4.8–10.8)
WBC # BLD: 12.4 K/UL — HIGH (ref 4.8–10.8)
WBC # BLD: 12.54 K/UL — HIGH (ref 4.8–10.8)
WBC # BLD: 12.59 K/UL — HIGH (ref 4.8–10.8)
WBC # BLD: 12.82 K/UL — HIGH (ref 4.8–10.8)
WBC # BLD: 12.84 K/UL — HIGH (ref 4.8–10.8)
WBC # BLD: 12.91 K/UL — HIGH (ref 4.8–10.8)
WBC # BLD: 13.22 K/UL — HIGH (ref 4.8–10.8)
WBC # BLD: 13.23 K/UL — HIGH (ref 4.8–10.8)
WBC # BLD: 13.25 K/UL — HIGH (ref 4.8–10.8)
WBC # BLD: 13.27 K/UL — HIGH (ref 4.8–10.8)
WBC # BLD: 13.33 K/UL — HIGH (ref 4.8–10.8)
WBC # BLD: 13.61 K/UL — HIGH (ref 4.8–10.8)
WBC # BLD: 13.65 K/UL — HIGH (ref 4.8–10.8)
WBC # BLD: 14.06 K/UL — HIGH (ref 4.8–10.8)
WBC # BLD: 14.06 K/UL — HIGH (ref 4.8–10.8)
WBC # BLD: 14.36 K/UL — HIGH (ref 4.8–10.8)
WBC # BLD: 14.64 K/UL — HIGH (ref 4.8–10.8)
WBC # BLD: 16.09 K/UL — HIGH (ref 4.8–10.8)
WBC # BLD: 16.88 K/UL — HIGH (ref 4.8–10.8)
WBC # BLD: 6.33 K/UL — SIGNIFICANT CHANGE UP (ref 4.8–10.8)
WBC # BLD: 7.65 K/UL — SIGNIFICANT CHANGE UP (ref 4.8–10.8)
WBC # BLD: 8.58 K/UL — SIGNIFICANT CHANGE UP (ref 4.8–10.8)
WBC # BLD: 9 K/UL — SIGNIFICANT CHANGE UP (ref 4.8–10.8)
WBC # BLD: 9.07 K/UL — SIGNIFICANT CHANGE UP (ref 4.8–10.8)
WBC # BLD: 9.3 K/UL — SIGNIFICANT CHANGE UP (ref 4.8–10.8)
WBC # BLD: 9.39 K/UL — SIGNIFICANT CHANGE UP (ref 4.8–10.8)
WBC # BLD: 9.64 K/UL — SIGNIFICANT CHANGE UP (ref 4.8–10.8)
WBC # BLD: 9.69 K/UL — SIGNIFICANT CHANGE UP (ref 4.8–10.8)
WBC # BLD: 9.81 K/UL — SIGNIFICANT CHANGE UP (ref 4.8–10.8)
WBC # BLD: 9.84 K/UL — SIGNIFICANT CHANGE UP (ref 4.8–10.8)
WBC # BLD: 9.99 K/UL — SIGNIFICANT CHANGE UP (ref 4.8–10.8)
WBC # FLD AUTO: 10.1 K/UL — SIGNIFICANT CHANGE UP (ref 4.8–10.8)
WBC # FLD AUTO: 10.22 K/UL — SIGNIFICANT CHANGE UP (ref 4.8–10.8)
WBC # FLD AUTO: 10.4 K/UL — SIGNIFICANT CHANGE UP (ref 4.8–10.8)
WBC # FLD AUTO: 10.45 K/UL — SIGNIFICANT CHANGE UP (ref 4.8–10.8)
WBC # FLD AUTO: 10.47 K/UL — SIGNIFICANT CHANGE UP (ref 4.8–10.8)
WBC # FLD AUTO: 10.5 K/UL — SIGNIFICANT CHANGE UP (ref 4.8–10.8)
WBC # FLD AUTO: 10.56 K/UL — SIGNIFICANT CHANGE UP (ref 4.8–10.8)
WBC # FLD AUTO: 10.57 K/UL — SIGNIFICANT CHANGE UP (ref 4.8–10.8)
WBC # FLD AUTO: 10.59 K/UL — SIGNIFICANT CHANGE UP (ref 4.8–10.8)
WBC # FLD AUTO: 10.65 K/UL — SIGNIFICANT CHANGE UP (ref 4.8–10.8)
WBC # FLD AUTO: 10.86 K/UL — HIGH (ref 4.8–10.8)
WBC # FLD AUTO: 10.9 K/UL — HIGH (ref 4.8–10.8)
WBC # FLD AUTO: 10.93 K/UL — HIGH (ref 4.8–10.8)
WBC # FLD AUTO: 11.14 K/UL — HIGH (ref 4.8–10.8)
WBC # FLD AUTO: 11.17 K/UL — HIGH (ref 4.8–10.8)
WBC # FLD AUTO: 11.22 K/UL — HIGH (ref 4.8–10.8)
WBC # FLD AUTO: 11.37 K/UL — HIGH (ref 4.8–10.8)
WBC # FLD AUTO: 11.38 K/UL — HIGH (ref 4.8–10.8)
WBC # FLD AUTO: 11.51 K/UL — HIGH (ref 4.8–10.8)
WBC # FLD AUTO: 11.6 K/UL — HIGH (ref 4.8–10.8)
WBC # FLD AUTO: 11.62 K/UL — HIGH (ref 4.8–10.8)
WBC # FLD AUTO: 11.77 K/UL — HIGH (ref 4.8–10.8)
WBC # FLD AUTO: 11.79 K/UL — HIGH (ref 4.8–10.8)
WBC # FLD AUTO: 11.84 K/UL — HIGH (ref 4.8–10.8)
WBC # FLD AUTO: 11.87 K/UL — HIGH (ref 4.8–10.8)
WBC # FLD AUTO: 11.9 K/UL — HIGH (ref 4.8–10.8)
WBC # FLD AUTO: 12.06 K/UL — HIGH (ref 4.8–10.8)
WBC # FLD AUTO: 12.29 K/UL — HIGH (ref 4.8–10.8)
WBC # FLD AUTO: 12.4 K/UL — HIGH (ref 4.8–10.8)
WBC # FLD AUTO: 12.54 K/UL — HIGH (ref 4.8–10.8)
WBC # FLD AUTO: 12.59 K/UL — HIGH (ref 4.8–10.8)
WBC # FLD AUTO: 12.82 K/UL — HIGH (ref 4.8–10.8)
WBC # FLD AUTO: 12.84 K/UL — HIGH (ref 4.8–10.8)
WBC # FLD AUTO: 12.91 K/UL — HIGH (ref 4.8–10.8)
WBC # FLD AUTO: 13.22 K/UL — HIGH (ref 4.8–10.8)
WBC # FLD AUTO: 13.23 K/UL — HIGH (ref 4.8–10.8)
WBC # FLD AUTO: 13.25 K/UL — HIGH (ref 4.8–10.8)
WBC # FLD AUTO: 13.27 K/UL — HIGH (ref 4.8–10.8)
WBC # FLD AUTO: 13.33 K/UL — HIGH (ref 4.8–10.8)
WBC # FLD AUTO: 13.61 K/UL — HIGH (ref 4.8–10.8)
WBC # FLD AUTO: 13.65 K/UL — HIGH (ref 4.8–10.8)
WBC # FLD AUTO: 14.06 K/UL — HIGH (ref 4.8–10.8)
WBC # FLD AUTO: 14.06 K/UL — HIGH (ref 4.8–10.8)
WBC # FLD AUTO: 14.36 K/UL — HIGH (ref 4.8–10.8)
WBC # FLD AUTO: 14.64 K/UL — HIGH (ref 4.8–10.8)
WBC # FLD AUTO: 16.09 K/UL — HIGH (ref 4.8–10.8)
WBC # FLD AUTO: 16.88 K/UL — HIGH (ref 4.8–10.8)
WBC # FLD AUTO: 6.33 K/UL — SIGNIFICANT CHANGE UP (ref 4.8–10.8)
WBC # FLD AUTO: 7.65 K/UL — SIGNIFICANT CHANGE UP (ref 4.8–10.8)
WBC # FLD AUTO: 8.58 K/UL — SIGNIFICANT CHANGE UP (ref 4.8–10.8)
WBC # FLD AUTO: 9 K/UL — SIGNIFICANT CHANGE UP (ref 4.8–10.8)
WBC # FLD AUTO: 9.07 K/UL — SIGNIFICANT CHANGE UP (ref 4.8–10.8)
WBC # FLD AUTO: 9.3 K/UL — SIGNIFICANT CHANGE UP (ref 4.8–10.8)
WBC # FLD AUTO: 9.39 K/UL — SIGNIFICANT CHANGE UP (ref 4.8–10.8)
WBC # FLD AUTO: 9.64 K/UL — SIGNIFICANT CHANGE UP (ref 4.8–10.8)
WBC # FLD AUTO: 9.69 K/UL — SIGNIFICANT CHANGE UP (ref 4.8–10.8)
WBC # FLD AUTO: 9.81 K/UL — SIGNIFICANT CHANGE UP (ref 4.8–10.8)
WBC # FLD AUTO: 9.84 K/UL — SIGNIFICANT CHANGE UP (ref 4.8–10.8)
WBC # FLD AUTO: 9.99 K/UL — SIGNIFICANT CHANGE UP (ref 4.8–10.8)
WBC UR QL: 124 /HPF — HIGH (ref 0–5)
WBC UR QL: 293 /HPF — HIGH (ref 0–5)
WBC UR QL: 606 /HPF — HIGH (ref 0–5)
ZINC SERPL-MCNC: 54 UG/DL — SIGNIFICANT CHANGE UP (ref 44–115)

## 2023-01-01 PROCEDURE — 36558 INSERT TUNNELED CV CATH: CPT

## 2023-01-01 PROCEDURE — 80053 COMPREHEN METABOLIC PANEL: CPT

## 2023-01-01 PROCEDURE — 85610 PROTHROMBIN TIME: CPT

## 2023-01-01 PROCEDURE — 83970 ASSAY OF PARATHORMONE: CPT

## 2023-01-01 PROCEDURE — 86850 RBC ANTIBODY SCREEN: CPT

## 2023-01-01 PROCEDURE — 36556 INSERT NON-TUNNEL CV CATH: CPT

## 2023-01-01 PROCEDURE — 74018 RADEX ABDOMEN 1 VIEW: CPT

## 2023-01-01 PROCEDURE — 99233 SBSQ HOSP IP/OBS HIGH 50: CPT

## 2023-01-01 PROCEDURE — 84443 ASSAY THYROID STIM HORMONE: CPT

## 2023-01-01 PROCEDURE — 71045 X-RAY EXAM CHEST 1 VIEW: CPT | Mod: 26

## 2023-01-01 PROCEDURE — 70450 CT HEAD/BRAIN W/O DYE: CPT | Mod: 26

## 2023-01-01 PROCEDURE — 83735 ASSAY OF MAGNESIUM: CPT

## 2023-01-01 PROCEDURE — 99232 SBSQ HOSP IP/OBS MODERATE 35: CPT

## 2023-01-01 PROCEDURE — 82607 VITAMIN B-12: CPT

## 2023-01-01 PROCEDURE — 94760 N-INVAS EAR/PLS OXIMETRY 1: CPT

## 2023-01-01 PROCEDURE — 81001 URINALYSIS AUTO W/SCOPE: CPT

## 2023-01-01 PROCEDURE — 71045 X-RAY EXAM CHEST 1 VIEW: CPT | Mod: 26,76

## 2023-01-01 PROCEDURE — 82668 ASSAY OF ERYTHROPOIETIN: CPT

## 2023-01-01 PROCEDURE — 36430 TRANSFUSION BLD/BLD COMPNT: CPT

## 2023-01-01 PROCEDURE — C9113: CPT

## 2023-01-01 PROCEDURE — 99291 CRITICAL CARE FIRST HOUR: CPT | Mod: GC

## 2023-01-01 PROCEDURE — 94002 VENT MGMT INPAT INIT DAY: CPT

## 2023-01-01 PROCEDURE — 82746 ASSAY OF FOLIC ACID SERUM: CPT

## 2023-01-01 PROCEDURE — 82962 GLUCOSE BLOOD TEST: CPT

## 2023-01-01 PROCEDURE — 86704 HEP B CORE ANTIBODY TOTAL: CPT

## 2023-01-01 PROCEDURE — 84466 ASSAY OF TRANSFERRIN: CPT

## 2023-01-01 PROCEDURE — 93010 ELECTROCARDIOGRAM REPORT: CPT

## 2023-01-01 PROCEDURE — 84100 ASSAY OF PHOSPHORUS: CPT

## 2023-01-01 PROCEDURE — 83036 HEMOGLOBIN GLYCOSYLATED A1C: CPT

## 2023-01-01 PROCEDURE — 95700 EEG CONT REC W/VID EEG TECH: CPT

## 2023-01-01 PROCEDURE — 82306 VITAMIN D 25 HYDROXY: CPT

## 2023-01-01 PROCEDURE — 71045 X-RAY EXAM CHEST 1 VIEW: CPT

## 2023-01-01 PROCEDURE — 99252 IP/OBS CONSLTJ NEW/EST SF 35: CPT

## 2023-01-01 PROCEDURE — 87640 STAPH A DNA AMP PROBE: CPT

## 2023-01-01 PROCEDURE — 95718 EEG PHYS/QHP 2-12 HR W/VEEG: CPT

## 2023-01-01 PROCEDURE — 82803 BLOOD GASES ANY COMBINATION: CPT

## 2023-01-01 PROCEDURE — 95720 EEG PHY/QHP EA INCR W/VEEG: CPT

## 2023-01-01 PROCEDURE — 83880 ASSAY OF NATRIURETIC PEPTIDE: CPT

## 2023-01-01 PROCEDURE — 99223 1ST HOSP IP/OBS HIGH 75: CPT

## 2023-01-01 PROCEDURE — 90935 HEMODIALYSIS ONE EVALUATION: CPT

## 2023-01-01 PROCEDURE — 93306 TTE W/DOPPLER COMPLETE: CPT | Mod: 26

## 2023-01-01 PROCEDURE — 87186 SC STD MICRODIL/AGAR DIL: CPT

## 2023-01-01 PROCEDURE — 86901 BLOOD TYPING SEROLOGIC RH(D): CPT

## 2023-01-01 PROCEDURE — 99291 CRITICAL CARE FIRST HOUR: CPT

## 2023-01-01 PROCEDURE — 82977 ASSAY OF GGT: CPT

## 2023-01-01 PROCEDURE — 80177 DRUG SCRN QUAN LEVETIRACETAM: CPT

## 2023-01-01 PROCEDURE — 82570 ASSAY OF URINE CREATININE: CPT

## 2023-01-01 PROCEDURE — 94640 AIRWAY INHALATION TREATMENT: CPT

## 2023-01-01 PROCEDURE — 80164 ASSAY DIPROPYLACETIC ACD TOT: CPT

## 2023-01-01 PROCEDURE — 99222 1ST HOSP IP/OBS MODERATE 55: CPT

## 2023-01-01 PROCEDURE — 87493 C DIFF AMPLIFIED PROBE: CPT

## 2023-01-01 PROCEDURE — 87040 BLOOD CULTURE FOR BACTERIA: CPT

## 2023-01-01 PROCEDURE — 82533 TOTAL CORTISOL: CPT

## 2023-01-01 PROCEDURE — 99232 SBSQ HOSP IP/OBS MODERATE 35: CPT | Mod: GC

## 2023-01-01 PROCEDURE — 87389 HIV-1 AG W/HIV-1&-2 AB AG IA: CPT

## 2023-01-01 PROCEDURE — 99291 CRITICAL CARE FIRST HOUR: CPT | Mod: 25

## 2023-01-01 PROCEDURE — 83550 IRON BINDING TEST: CPT

## 2023-01-01 PROCEDURE — 85025 COMPLETE CBC W/AUTO DIFF WBC: CPT

## 2023-01-01 PROCEDURE — 87077 CULTURE AEROBIC IDENTIFY: CPT

## 2023-01-01 PROCEDURE — 99254 IP/OBS CNSLTJ NEW/EST MOD 60: CPT

## 2023-01-01 PROCEDURE — 87340 HEPATITIS B SURFACE AG IA: CPT

## 2023-01-01 PROCEDURE — 99231 SBSQ HOSP IP/OBS SF/LOW 25: CPT

## 2023-01-01 PROCEDURE — 77001 FLUOROGUIDE FOR VEIN DEVICE: CPT | Mod: 26

## 2023-01-01 PROCEDURE — 84295 ASSAY OF SERUM SODIUM: CPT

## 2023-01-01 PROCEDURE — 76937 US GUIDE VASCULAR ACCESS: CPT | Mod: 26

## 2023-01-01 PROCEDURE — 99253 IP/OBS CNSLTJ NEW/EST LOW 45: CPT | Mod: GC

## 2023-01-01 PROCEDURE — 75809 NONVASCULAR SHUNT X-RAY: CPT

## 2023-01-01 PROCEDURE — 76770 US EXAM ABDO BACK WALL COMP: CPT

## 2023-01-01 PROCEDURE — 83605 ASSAY OF LACTIC ACID: CPT

## 2023-01-01 PROCEDURE — 86703 HIV-1/HIV-2 1 RESULT ANTBDY: CPT

## 2023-01-01 PROCEDURE — 86923 COMPATIBILITY TEST ELECTRIC: CPT

## 2023-01-01 PROCEDURE — 82310 ASSAY OF CALCIUM: CPT

## 2023-01-01 PROCEDURE — 0225U NFCT DS DNA&RNA 21 SARSCOV2: CPT

## 2023-01-01 PROCEDURE — 86140 C-REACTIVE PROTEIN: CPT

## 2023-01-01 PROCEDURE — 93970 EXTREMITY STUDY: CPT

## 2023-01-01 PROCEDURE — 84681 ASSAY OF C-PEPTIDE: CPT

## 2023-01-01 PROCEDURE — 82270 OCCULT BLOOD FECES: CPT

## 2023-01-01 PROCEDURE — 80202 ASSAY OF VANCOMYCIN: CPT

## 2023-01-01 PROCEDURE — 83540 ASSAY OF IRON: CPT

## 2023-01-01 PROCEDURE — 93306 TTE W/DOPPLER COMPLETE: CPT

## 2023-01-01 PROCEDURE — 82652 VIT D 1 25-DIHYDROXY: CPT

## 2023-01-01 PROCEDURE — C1750: CPT

## 2023-01-01 PROCEDURE — 87070 CULTURE OTHR SPECIMN AEROBIC: CPT

## 2023-01-01 PROCEDURE — P9016: CPT

## 2023-01-01 PROCEDURE — 85027 COMPLETE CBC AUTOMATED: CPT

## 2023-01-01 PROCEDURE — 97602 WOUND(S) CARE NON-SELECTIVE: CPT

## 2023-01-01 PROCEDURE — 82330 ASSAY OF CALCIUM: CPT

## 2023-01-01 PROCEDURE — 84145 PROCALCITONIN (PCT): CPT

## 2023-01-01 PROCEDURE — 87086 URINE CULTURE/COLONY COUNT: CPT

## 2023-01-01 PROCEDURE — 94003 VENT MGMT INPAT SUBQ DAY: CPT

## 2023-01-01 PROCEDURE — 74018 RADEX ABDOMEN 1 VIEW: CPT | Mod: 26

## 2023-01-01 PROCEDURE — 84206 ASSAY OF PROINSULIN: CPT

## 2023-01-01 PROCEDURE — 76770 US EXAM ABDO BACK WALL COMP: CPT | Mod: 26

## 2023-01-01 PROCEDURE — 83935 ASSAY OF URINE OSMOLALITY: CPT

## 2023-01-01 PROCEDURE — 84439 ASSAY OF FREE THYROXINE: CPT

## 2023-01-01 PROCEDURE — C1769: CPT

## 2023-01-01 PROCEDURE — 82550 ASSAY OF CK (CPK): CPT

## 2023-01-01 PROCEDURE — C9254: CPT

## 2023-01-01 PROCEDURE — 87102 FUNGUS ISOLATION CULTURE: CPT

## 2023-01-01 PROCEDURE — 87641 MR-STAPH DNA AMP PROBE: CPT

## 2023-01-01 PROCEDURE — 83010 ASSAY OF HAPTOGLOBIN QUANT: CPT

## 2023-01-01 PROCEDURE — 84300 ASSAY OF URINE SODIUM: CPT

## 2023-01-01 PROCEDURE — 62252 CSF SHUNT REPROGRAM: CPT | Mod: 26,AS

## 2023-01-01 PROCEDURE — 80061 LIPID PANEL: CPT

## 2023-01-01 PROCEDURE — 80074 ACUTE HEPATITIS PANEL: CPT

## 2023-01-01 PROCEDURE — 85014 HEMATOCRIT: CPT

## 2023-01-01 PROCEDURE — 82728 ASSAY OF FERRITIN: CPT

## 2023-01-01 PROCEDURE — 82010 KETONE BODYS QUAN: CPT

## 2023-01-01 PROCEDURE — 99253 IP/OBS CNSLTJ NEW/EST LOW 45: CPT

## 2023-01-01 PROCEDURE — 85018 HEMOGLOBIN: CPT

## 2023-01-01 PROCEDURE — 75809 NONVASCULAR SHUNT X-RAY: CPT | Mod: 26

## 2023-01-01 PROCEDURE — 99497 ADVNCD CARE PLAN 30 MIN: CPT

## 2023-01-01 PROCEDURE — 36415 COLL VENOUS BLD VENIPUNCTURE: CPT

## 2023-01-01 PROCEDURE — 80048 BASIC METABOLIC PNL TOTAL CA: CPT

## 2023-01-01 PROCEDURE — 86900 BLOOD TYPING SEROLOGIC ABO: CPT

## 2023-01-01 PROCEDURE — 76937 US GUIDE VASCULAR ACCESS: CPT

## 2023-01-01 PROCEDURE — 93970 EXTREMITY STUDY: CPT | Mod: 26

## 2023-01-01 PROCEDURE — 85045 AUTOMATED RETICULOCYTE COUNT: CPT

## 2023-01-01 PROCEDURE — 82140 ASSAY OF AMMONIA: CPT

## 2023-01-01 PROCEDURE — 99498 ADVNCD CARE PLAN ADDL 30 MIN: CPT

## 2023-01-01 PROCEDURE — 84630 ASSAY OF ZINC: CPT

## 2023-01-01 PROCEDURE — 70450 CT HEAD/BRAIN W/O DYE: CPT

## 2023-01-01 PROCEDURE — 71250 CT THORAX DX C-: CPT

## 2023-01-01 PROCEDURE — 71250 CT THORAX DX C-: CPT | Mod: 26

## 2023-01-01 PROCEDURE — 95714 VEEG EA 12-26 HR UNMNTR: CPT

## 2023-01-01 PROCEDURE — 93005 ELECTROCARDIOGRAM TRACING: CPT

## 2023-01-01 PROCEDURE — 86706 HEP B SURFACE ANTIBODY: CPT

## 2023-01-01 PROCEDURE — 84484 ASSAY OF TROPONIN QUANT: CPT

## 2023-01-01 PROCEDURE — 99239 HOSP IP/OBS DSCHRG MGMT >30: CPT

## 2023-01-01 PROCEDURE — 83615 LACTATE (LD) (LDH) ENZYME: CPT

## 2023-01-01 PROCEDURE — 85730 THROMBOPLASTIN TIME PARTIAL: CPT

## 2023-01-01 PROCEDURE — 84132 ASSAY OF SERUM POTASSIUM: CPT

## 2023-01-01 PROCEDURE — 86803 HEPATITIS C AB TEST: CPT

## 2023-01-01 PROCEDURE — 77001 FLUOROGUIDE FOR VEIN DEVICE: CPT

## 2023-01-01 RX ORDER — FERROUS SULFATE 325(65) MG
7.5 TABLET ORAL
Qty: 0 | Refills: 0 | DISCHARGE

## 2023-01-01 RX ORDER — SODIUM ZIRCONIUM CYCLOSILICATE 10 G/10G
10 POWDER, FOR SUSPENSION ORAL ONCE
Refills: 0 | Status: COMPLETED | OUTPATIENT
Start: 2023-01-01 | End: 2023-01-01

## 2023-01-01 RX ORDER — MIDODRINE HYDROCHLORIDE 2.5 MG/1
10 TABLET ORAL EVERY 8 HOURS
Refills: 0 | Status: DISCONTINUED | OUTPATIENT
Start: 2023-01-01 | End: 2023-01-01

## 2023-01-01 RX ORDER — ASPIRIN/CALCIUM CARB/MAGNESIUM 324 MG
81 TABLET ORAL DAILY
Refills: 0 | Status: DISCONTINUED | OUTPATIENT
Start: 2023-01-01 | End: 2023-01-01

## 2023-01-01 RX ORDER — DEXTROSE 50 % IN WATER 50 %
50 SYRINGE (ML) INTRAVENOUS ONCE
Refills: 0 | Status: COMPLETED | OUTPATIENT
Start: 2023-01-01 | End: 2023-01-01

## 2023-01-01 RX ORDER — POTASSIUM CHLORIDE 20 MEQ
20 PACKET (EA) ORAL
Refills: 0 | Status: COMPLETED | OUTPATIENT
Start: 2023-01-01 | End: 2023-01-01

## 2023-01-01 RX ORDER — VANCOMYCIN HCL 1 G
1000 VIAL (EA) INTRAVENOUS ONCE
Refills: 0 | Status: COMPLETED | OUTPATIENT
Start: 2023-01-01 | End: 2023-01-01

## 2023-01-01 RX ORDER — VANCOMYCIN HCL 1 G
1400 VIAL (EA) INTRAVENOUS ONCE
Refills: 0 | Status: DISCONTINUED | OUTPATIENT
Start: 2023-01-01 | End: 2023-01-01

## 2023-01-01 RX ORDER — DILTIAZEM HCL 120 MG
120 CAPSULE, EXT RELEASE 24 HR ORAL DAILY
Refills: 0 | Status: DISCONTINUED | OUTPATIENT
Start: 2023-01-01 | End: 2023-01-01

## 2023-01-01 RX ORDER — NOREPINEPHRINE BITARTRATE/D5W 8 MG/250ML
0.05 PLASTIC BAG, INJECTION (ML) INTRAVENOUS
Qty: 8 | Refills: 0 | Status: DISCONTINUED | OUTPATIENT
Start: 2023-01-01 | End: 2023-01-01

## 2023-01-01 RX ORDER — FOLIC ACID 0.8 MG
1 TABLET ORAL
Qty: 0 | Refills: 0 | DISCHARGE

## 2023-01-01 RX ORDER — SCOPALAMINE 1 MG/3D
1 PATCH, EXTENDED RELEASE TRANSDERMAL ONCE
Refills: 0 | Status: COMPLETED | OUTPATIENT
Start: 2023-01-01 | End: 2023-01-01

## 2023-01-01 RX ORDER — METOPROLOL TARTRATE 50 MG
5 TABLET ORAL ONCE
Refills: 0 | Status: COMPLETED | OUTPATIENT
Start: 2023-01-01 | End: 2023-01-01

## 2023-01-01 RX ORDER — POTASSIUM CHLORIDE 20 MEQ
20 PACKET (EA) ORAL
Refills: 0 | Status: DISCONTINUED | OUTPATIENT
Start: 2023-01-01 | End: 2023-01-01

## 2023-01-01 RX ORDER — CLONAZEPAM 1 MG
3 TABLET ORAL THREE TIMES A DAY
Refills: 0 | Status: DISCONTINUED | OUTPATIENT
Start: 2023-01-01 | End: 2023-01-01

## 2023-01-01 RX ORDER — HEPARIN SODIUM 5000 [USP'U]/ML
5000 INJECTION INTRAVENOUS; SUBCUTANEOUS EVERY 12 HOURS
Refills: 0 | Status: DISCONTINUED | OUTPATIENT
Start: 2023-01-01 | End: 2023-01-01

## 2023-01-01 RX ORDER — CEFEPIME 1 G/1
1000 INJECTION, POWDER, FOR SOLUTION INTRAMUSCULAR; INTRAVENOUS EVERY 12 HOURS
Refills: 0 | Status: DISCONTINUED | OUTPATIENT
Start: 2023-01-01 | End: 2023-01-01

## 2023-01-01 RX ORDER — ROBINUL 0.2 MG/ML
0.4 INJECTION INTRAMUSCULAR; INTRAVENOUS EVERY 6 HOURS
Refills: 0 | Status: DISCONTINUED | OUTPATIENT
Start: 2023-01-01 | End: 2023-01-01

## 2023-01-01 RX ORDER — SODIUM CHLORIDE 9 MG/ML
500 INJECTION, SOLUTION INTRAVENOUS ONCE
Refills: 0 | Status: COMPLETED | OUTPATIENT
Start: 2023-01-01 | End: 2023-01-01

## 2023-01-01 RX ORDER — FUROSEMIDE 40 MG
1 TABLET ORAL
Refills: 0 | DISCHARGE

## 2023-01-01 RX ORDER — SODIUM ZIRCONIUM CYCLOSILICATE 10 G/10G
5 POWDER, FOR SUSPENSION ORAL DAILY
Refills: 0 | Status: DISCONTINUED | OUTPATIENT
Start: 2023-01-01 | End: 2023-01-01

## 2023-01-01 RX ORDER — SODIUM HYPOCHLORITE 0.125 %
1 SOLUTION, NON-ORAL MISCELLANEOUS ONCE
Refills: 0 | Status: COMPLETED | OUTPATIENT
Start: 2023-01-01 | End: 2023-01-01

## 2023-01-01 RX ORDER — IPRATROPIUM/ALBUTEROL SULFATE 18-103MCG
3 AEROSOL WITH ADAPTER (GRAM) INHALATION EVERY 6 HOURS
Refills: 0 | Status: DISCONTINUED | OUTPATIENT
Start: 2023-01-01 | End: 2023-01-01

## 2023-01-01 RX ORDER — BUMETANIDE 0.25 MG/ML
1 INJECTION INTRAMUSCULAR; INTRAVENOUS
Qty: 0 | Refills: 0 | DISCHARGE
Start: 2023-01-01

## 2023-01-01 RX ORDER — PANTOPRAZOLE SODIUM 20 MG/1
40 TABLET, DELAYED RELEASE ORAL
Refills: 0 | Status: DISCONTINUED | OUTPATIENT
Start: 2023-01-01 | End: 2023-01-01

## 2023-01-01 RX ORDER — MIDODRINE HYDROCHLORIDE 2.5 MG/1
5 TABLET ORAL THREE TIMES A DAY
Refills: 0 | Status: DISCONTINUED | OUTPATIENT
Start: 2023-01-01 | End: 2023-01-01

## 2023-01-01 RX ORDER — PROPOFOL 10 MG/ML
100 INJECTION, EMULSION INTRAVENOUS ONCE
Refills: 0 | Status: COMPLETED | OUTPATIENT
Start: 2023-01-01 | End: 2023-01-01

## 2023-01-01 RX ORDER — ACETYLCYSTEINE 200 MG/ML
4 VIAL (ML) MISCELLANEOUS EVERY 6 HOURS
Refills: 0 | Status: DISCONTINUED | OUTPATIENT
Start: 2023-01-01 | End: 2023-01-01

## 2023-01-01 RX ORDER — SODIUM HYPOCHLORITE 0.125 %
1 SOLUTION, NON-ORAL MISCELLANEOUS EVERY 12 HOURS
Refills: 0 | Status: DISCONTINUED | OUTPATIENT
Start: 2023-01-01 | End: 2023-01-01

## 2023-01-01 RX ORDER — LACTULOSE 10 G/15ML
20 SOLUTION ORAL EVERY 8 HOURS
Refills: 0 | Status: DISCONTINUED | OUTPATIENT
Start: 2023-01-01 | End: 2023-01-01

## 2023-01-01 RX ORDER — COLLAGENASE CLOSTRIDIUM HIST. 250 UNIT/G
1 OINTMENT (GRAM) TOPICAL
Refills: 0 | Status: DISCONTINUED | OUTPATIENT
Start: 2023-01-01 | End: 2023-01-01

## 2023-01-01 RX ORDER — INSULIN HUMAN 100 [IU]/ML
10 INJECTION, SOLUTION SUBCUTANEOUS
Qty: 100 | Refills: 0 | Status: DISCONTINUED | OUTPATIENT
Start: 2023-01-01 | End: 2023-01-01

## 2023-01-01 RX ORDER — GENTAMICIN SULFATE 40 MG/ML
100 VIAL (ML) INJECTION ONCE
Refills: 0 | Status: COMPLETED | OUTPATIENT
Start: 2023-01-01 | End: 2023-01-01

## 2023-01-01 RX ORDER — ATORVASTATIN CALCIUM 80 MG/1
80 TABLET, FILM COATED ORAL AT BEDTIME
Refills: 0 | Status: DISCONTINUED | OUTPATIENT
Start: 2023-01-01 | End: 2023-01-01

## 2023-01-01 RX ORDER — SODIUM BICARBONATE 1 MEQ/ML
100 SYRINGE (ML) INTRAVENOUS ONCE
Refills: 0 | Status: COMPLETED | OUTPATIENT
Start: 2023-01-01 | End: 2023-01-01

## 2023-01-01 RX ORDER — SODIUM CHLORIDE 9 MG/ML
100 INJECTION INTRAMUSCULAR; INTRAVENOUS; SUBCUTANEOUS
Refills: 0 | Status: DISCONTINUED | OUTPATIENT
Start: 2023-01-01 | End: 2023-01-01

## 2023-01-01 RX ORDER — SENNA PLUS 8.6 MG/1
2 TABLET ORAL AT BEDTIME
Refills: 0 | Status: DISCONTINUED | OUTPATIENT
Start: 2023-01-01 | End: 2023-01-01

## 2023-01-01 RX ORDER — SODIUM CHLORIDE 9 MG/ML
1000 INJECTION, SOLUTION INTRAVENOUS
Refills: 0 | Status: DISCONTINUED | OUTPATIENT
Start: 2023-01-01 | End: 2023-01-01

## 2023-01-01 RX ORDER — POLYETHYLENE GLYCOL 3350 17 G/17G
17 POWDER, FOR SOLUTION ORAL DAILY
Refills: 0 | Status: DISCONTINUED | OUTPATIENT
Start: 2023-01-01 | End: 2023-01-01

## 2023-01-01 RX ORDER — TAMSULOSIN HYDROCHLORIDE 0.4 MG/1
0.4 CAPSULE ORAL AT BEDTIME
Refills: 0 | Status: DISCONTINUED | OUTPATIENT
Start: 2023-01-01 | End: 2023-01-01

## 2023-01-01 RX ORDER — AZTREONAM 2 G
VIAL (EA) INJECTION
Refills: 0 | Status: DISCONTINUED | OUTPATIENT
Start: 2023-01-01 | End: 2023-01-01

## 2023-01-01 RX ORDER — ASCORBIC ACID 60 MG
1 TABLET,CHEWABLE ORAL
Refills: 0 | DISCHARGE

## 2023-01-01 RX ORDER — MIDODRINE HYDROCHLORIDE 2.5 MG/1
10 TABLET ORAL THREE TIMES A DAY
Refills: 0 | Status: DISCONTINUED | OUTPATIENT
Start: 2023-01-01 | End: 2023-01-01

## 2023-01-01 RX ORDER — AMIODARONE HYDROCHLORIDE 400 MG/1
200 TABLET ORAL DAILY
Refills: 0 | Status: DISCONTINUED | OUTPATIENT
Start: 2023-01-01 | End: 2023-01-01

## 2023-01-01 RX ORDER — DEXTROSE 50 % IN WATER 50 %
25 SYRINGE (ML) INTRAVENOUS ONCE
Refills: 0 | Status: DISCONTINUED | OUTPATIENT
Start: 2023-01-01 | End: 2023-01-01

## 2023-01-01 RX ORDER — SODIUM,POTASSIUM PHOSPHATES 278-250MG
1 POWDER IN PACKET (EA) ORAL ONCE
Refills: 0 | Status: COMPLETED | OUTPATIENT
Start: 2023-01-01 | End: 2023-01-01

## 2023-01-01 RX ORDER — DEXTROSE 50 % IN WATER 50 %
15 SYRINGE (ML) INTRAVENOUS ONCE
Refills: 0 | Status: DISCONTINUED | OUTPATIENT
Start: 2023-01-01 | End: 2023-01-01

## 2023-01-01 RX ORDER — LACOSAMIDE 50 MG/1
200 TABLET ORAL ONCE
Refills: 0 | Status: DISCONTINUED | OUTPATIENT
Start: 2023-01-01 | End: 2023-01-01

## 2023-01-01 RX ORDER — ASCORBIC ACID 60 MG
500 TABLET,CHEWABLE ORAL DAILY
Refills: 0 | Status: DISCONTINUED | OUTPATIENT
Start: 2023-01-01 | End: 2023-01-01

## 2023-01-01 RX ORDER — CHLORHEXIDINE GLUCONATE 213 G/1000ML
1 SOLUTION TOPICAL
Refills: 0 | Status: DISCONTINUED | OUTPATIENT
Start: 2023-01-01 | End: 2023-01-01

## 2023-01-01 RX ORDER — PROPOFOL 10 MG/ML
40 INJECTION, EMULSION INTRAVENOUS
Qty: 1000 | Refills: 0 | Status: DISCONTINUED | OUTPATIENT
Start: 2023-01-01 | End: 2023-01-01

## 2023-01-01 RX ORDER — FLUCONAZOLE 150 MG/1
200 TABLET ORAL EVERY 24 HOURS
Refills: 0 | Status: COMPLETED | OUTPATIENT
Start: 2023-01-01 | End: 2023-01-01

## 2023-01-01 RX ORDER — VALPROIC ACID (AS SODIUM SALT) 250 MG/5ML
500 SOLUTION, ORAL ORAL EVERY 8 HOURS
Refills: 0 | Status: DISCONTINUED | OUTPATIENT
Start: 2023-01-01 | End: 2023-01-01

## 2023-01-01 RX ORDER — PROPOFOL 10 MG/ML
50 INJECTION, EMULSION INTRAVENOUS
Qty: 1000 | Refills: 0 | Status: DISCONTINUED | OUTPATIENT
Start: 2023-01-01 | End: 2023-01-01

## 2023-01-01 RX ORDER — DEXTROSE 50 % IN WATER 50 %
12.5 SYRINGE (ML) INTRAVENOUS ONCE
Refills: 0 | Status: DISCONTINUED | OUTPATIENT
Start: 2023-01-01 | End: 2023-01-01

## 2023-01-01 RX ORDER — ERYTHROPOIETIN 10000 [IU]/ML
40000 INJECTION, SOLUTION INTRAVENOUS; SUBCUTANEOUS
Refills: 0 | Status: DISCONTINUED | OUTPATIENT
Start: 2023-01-01 | End: 2023-01-01

## 2023-01-01 RX ORDER — ACETAMINOPHEN 500 MG
650 TABLET ORAL EVERY 6 HOURS
Refills: 0 | Status: DISCONTINUED | OUTPATIENT
Start: 2023-01-01 | End: 2023-01-01

## 2023-01-01 RX ORDER — SODIUM ZIRCONIUM CYCLOSILICATE 10 G/10G
10 POWDER, FOR SUSPENSION ORAL EVERY 12 HOURS
Refills: 0 | Status: DISCONTINUED | OUTPATIENT
Start: 2023-01-01 | End: 2023-01-01

## 2023-01-01 RX ORDER — ERGOCALCIFEROL 1.25 MG/1
0.25 CAPSULE ORAL
Qty: 0 | Refills: 0 | DISCHARGE

## 2023-01-01 RX ORDER — ALBUTEROL 90 UG/1
1 AEROSOL, METERED ORAL
Qty: 0 | Refills: 0 | DISCHARGE

## 2023-01-01 RX ORDER — CEFTOLOZANE AND TAZOBACTAM 1; .5 G/10ML; G/10ML
450 INJECTION, POWDER, LYOPHILIZED, FOR SOLUTION INTRAVENOUS EVERY 8 HOURS
Refills: 0 | Status: COMPLETED | OUTPATIENT
Start: 2023-01-01 | End: 2023-01-01

## 2023-01-01 RX ORDER — GLIMEPIRIDE 1 MG
1 TABLET ORAL
Refills: 0 | DISCHARGE

## 2023-01-01 RX ORDER — PANTOPRAZOLE SODIUM 20 MG/1
40 TABLET, DELAYED RELEASE ORAL EVERY 12 HOURS
Refills: 0 | Status: DISCONTINUED | OUTPATIENT
Start: 2023-01-01 | End: 2023-01-01

## 2023-01-01 RX ORDER — VALPROIC ACID (AS SODIUM SALT) 250 MG/5ML
750 SOLUTION, ORAL ORAL EVERY 8 HOURS
Refills: 0 | Status: DISCONTINUED | OUTPATIENT
Start: 2023-01-01 | End: 2023-01-01

## 2023-01-01 RX ORDER — VANCOMYCIN HCL 1 G
1000 VIAL (EA) INTRAVENOUS EVERY 24 HOURS
Refills: 0 | Status: DISCONTINUED | OUTPATIENT
Start: 2023-01-01 | End: 2023-01-01

## 2023-01-01 RX ORDER — INSULIN HUMAN 100 [IU]/ML
10 INJECTION, SOLUTION SUBCUTANEOUS ONCE
Refills: 0 | Status: COMPLETED | OUTPATIENT
Start: 2023-01-01 | End: 2023-01-01

## 2023-01-01 RX ORDER — LIDOCAINE HCL 20 MG/ML
20 VIAL (ML) INJECTION ONCE
Refills: 0 | Status: COMPLETED | OUTPATIENT
Start: 2023-01-01 | End: 2023-01-01

## 2023-01-01 RX ORDER — DEXTROSE MONOHYDRATE, SODIUM CHLORIDE, AND POTASSIUM CHLORIDE 50; .745; 4.5 G/1000ML; G/1000ML; G/1000ML
1000 INJECTION, SOLUTION INTRAVENOUS
Refills: 0 | Status: DISCONTINUED | OUTPATIENT
Start: 2023-01-01 | End: 2023-01-01

## 2023-01-01 RX ORDER — INSULIN GLARGINE 100 [IU]/ML
10 INJECTION, SOLUTION SUBCUTANEOUS AT BEDTIME
Refills: 0 | Status: DISCONTINUED | OUTPATIENT
Start: 2023-01-01 | End: 2023-01-01

## 2023-01-01 RX ORDER — INSULIN LISPRO 100/ML
0 VIAL (ML) SUBCUTANEOUS
Refills: 0 | DISCHARGE

## 2023-01-01 RX ORDER — IRON SUCROSE 20 MG/ML
100 INJECTION, SOLUTION INTRAVENOUS
Refills: 0 | DISCHARGE

## 2023-01-01 RX ORDER — DEXTROSE 10 % IN WATER 10 %
1000 INTRAVENOUS SOLUTION INTRAVENOUS
Refills: 0 | Status: DISCONTINUED | OUTPATIENT
Start: 2023-01-01 | End: 2023-01-01

## 2023-01-01 RX ORDER — SODIUM,POTASSIUM PHOSPHATES 278-250MG
1 POWDER IN PACKET (EA) ORAL THREE TIMES A DAY
Refills: 0 | Status: DISCONTINUED | OUTPATIENT
Start: 2023-01-01 | End: 2023-01-01

## 2023-01-01 RX ORDER — AMIKACIN SULFATE 250 MG/ML
500 INJECTION, SOLUTION INTRAMUSCULAR; INTRAVENOUS ONCE
Refills: 0 | Status: DISCONTINUED | OUTPATIENT
Start: 2023-01-01 | End: 2023-01-01

## 2023-01-01 RX ORDER — IMIPENEM AND CILASTATIN 250; 250 MG/100ML; MG/100ML
500 INJECTION, POWDER, FOR SOLUTION INTRAVENOUS
Refills: 0 | DISCHARGE

## 2023-01-01 RX ORDER — GENTAMICIN SULFATE 40 MG/ML
60 VIAL (ML) INJECTION EVERY 24 HOURS
Refills: 0 | Status: DISCONTINUED | OUTPATIENT
Start: 2023-01-01 | End: 2023-01-01

## 2023-01-01 RX ORDER — METOCLOPRAMIDE HCL 10 MG
5 TABLET ORAL EVERY 8 HOURS
Refills: 0 | Status: DISCONTINUED | OUTPATIENT
Start: 2023-01-01 | End: 2023-01-01

## 2023-01-01 RX ORDER — LEVETIRACETAM 250 MG/1
500 TABLET, FILM COATED ORAL
Refills: 0 | Status: DISCONTINUED | OUTPATIENT
Start: 2023-01-01 | End: 2023-01-01

## 2023-01-01 RX ORDER — AMIODARONE HYDROCHLORIDE 400 MG/1
TABLET ORAL
Refills: 0 | Status: DISCONTINUED | OUTPATIENT
Start: 2023-01-01 | End: 2023-01-01

## 2023-01-01 RX ORDER — ZINC SULFATE TAB 220 MG (50 MG ZINC EQUIVALENT) 220 (50 ZN) MG
220 TAB ORAL DAILY
Refills: 0 | Status: DISCONTINUED | OUTPATIENT
Start: 2023-01-01 | End: 2023-01-01

## 2023-01-01 RX ORDER — BUMETANIDE 0.25 MG/ML
1 INJECTION INTRAMUSCULAR; INTRAVENOUS DAILY
Refills: 0 | Status: DISCONTINUED | OUTPATIENT
Start: 2023-01-01 | End: 2023-01-01

## 2023-01-01 RX ORDER — LANOLIN ALCOHOL/MO/W.PET/CERES
3 CREAM (GRAM) TOPICAL AT BEDTIME
Refills: 0 | Status: DISCONTINUED | OUTPATIENT
Start: 2023-01-01 | End: 2023-01-01

## 2023-01-01 RX ORDER — CHLORHEXIDINE GLUCONATE 213 G/1000ML
15 SOLUTION TOPICAL
Refills: 0 | Status: DISCONTINUED | OUTPATIENT
Start: 2023-01-01 | End: 2023-01-01

## 2023-01-01 RX ORDER — VANCOMYCIN HCL 1 G
125 VIAL (EA) INTRAVENOUS DAILY
Refills: 0 | Status: DISCONTINUED | OUTPATIENT
Start: 2023-01-01 | End: 2023-01-01

## 2023-01-01 RX ORDER — NOREPINEPHRINE BITARTRATE/D5W 8 MG/250ML
0.05 PLASTIC BAG, INJECTION (ML) INTRAVENOUS
Qty: 16 | Refills: 0 | Status: DISCONTINUED | OUTPATIENT
Start: 2023-01-01 | End: 2023-01-01

## 2023-01-01 RX ORDER — ACETAMINOPHEN 500 MG
2 TABLET ORAL
Qty: 0 | Refills: 0 | DISCHARGE

## 2023-01-01 RX ORDER — SODIUM CHLORIDE 9 MG/ML
1000 INJECTION INTRAMUSCULAR; INTRAVENOUS; SUBCUTANEOUS ONCE
Refills: 0 | Status: DISCONTINUED | OUTPATIENT
Start: 2023-01-01 | End: 2023-01-01

## 2023-01-01 RX ORDER — MIDODRINE HYDROCHLORIDE 2.5 MG/1
5 TABLET ORAL EVERY 8 HOURS
Refills: 0 | Status: DISCONTINUED | OUTPATIENT
Start: 2023-01-01 | End: 2023-01-01

## 2023-01-01 RX ORDER — POTASSIUM CHLORIDE 20 MEQ
40 PACKET (EA) ORAL ONCE
Refills: 0 | Status: COMPLETED | OUTPATIENT
Start: 2023-01-01 | End: 2023-01-01

## 2023-01-01 RX ORDER — OCTREOTIDE ACETATE 200 UG/ML
50 INJECTION, SOLUTION INTRAVENOUS; SUBCUTANEOUS ONCE
Refills: 0 | Status: COMPLETED | OUTPATIENT
Start: 2023-01-01 | End: 2023-01-01

## 2023-01-01 RX ORDER — AMIODARONE HYDROCHLORIDE 400 MG/1
200 TABLET ORAL
Qty: 0 | Refills: 0 | DISCHARGE
Start: 2023-01-01

## 2023-01-01 RX ORDER — BUMETANIDE 0.25 MG/ML
2 INJECTION INTRAMUSCULAR; INTRAVENOUS EVERY 12 HOURS
Refills: 0 | Status: DISCONTINUED | OUTPATIENT
Start: 2023-01-01 | End: 2023-01-01

## 2023-01-01 RX ORDER — AMIODARONE HYDROCHLORIDE 400 MG/1
400 TABLET ORAL EVERY 8 HOURS
Refills: 0 | Status: COMPLETED | OUTPATIENT
Start: 2023-01-01 | End: 2023-01-01

## 2023-01-01 RX ORDER — VALPROIC ACID (AS SODIUM SALT) 250 MG/5ML
3500 SOLUTION, ORAL ORAL ONCE
Refills: 0 | Status: DISCONTINUED | OUTPATIENT
Start: 2023-01-01 | End: 2023-01-01

## 2023-01-01 RX ORDER — METOCLOPRAMIDE HCL 10 MG
5 TABLET ORAL THREE TIMES A DAY
Refills: 0 | Status: DISCONTINUED | OUTPATIENT
Start: 2023-01-01 | End: 2023-01-01

## 2023-01-01 RX ORDER — GLUCAGON INJECTION, SOLUTION 0.5 MG/.1ML
1 INJECTION, SOLUTION SUBCUTANEOUS ONCE
Refills: 0 | Status: DISCONTINUED | OUTPATIENT
Start: 2023-01-01 | End: 2023-01-01

## 2023-01-01 RX ORDER — BUMETANIDE 0.25 MG/ML
2 INJECTION INTRAMUSCULAR; INTRAVENOUS DAILY
Refills: 0 | Status: DISCONTINUED | OUTPATIENT
Start: 2023-01-01 | End: 2023-01-01

## 2023-01-01 RX ORDER — LEVETIRACETAM 250 MG/1
1000 TABLET, FILM COATED ORAL ONCE
Refills: 0 | Status: COMPLETED | OUTPATIENT
Start: 2023-01-01 | End: 2023-01-01

## 2023-01-01 RX ORDER — CHLORHEXIDINE GLUCONATE 213 G/1000ML
15 SOLUTION TOPICAL EVERY 12 HOURS
Refills: 0 | Status: DISCONTINUED | OUTPATIENT
Start: 2023-01-01 | End: 2023-01-01

## 2023-01-01 RX ORDER — FERROUS SULFATE 325(65) MG
300 TABLET ORAL DAILY
Refills: 0 | Status: DISCONTINUED | OUTPATIENT
Start: 2023-01-01 | End: 2023-01-01

## 2023-01-01 RX ORDER — SODIUM CHLORIDE 9 MG/ML
250 INJECTION, SOLUTION INTRAVENOUS ONCE
Refills: 0 | Status: DISCONTINUED | OUTPATIENT
Start: 2023-01-01 | End: 2023-01-01

## 2023-01-01 RX ORDER — CLONAZEPAM 1 MG
2 TABLET ORAL THREE TIMES A DAY
Refills: 0 | Status: DISCONTINUED | OUTPATIENT
Start: 2023-01-01 | End: 2023-01-01

## 2023-01-01 RX ORDER — BUMETANIDE 0.25 MG/ML
4 INJECTION INTRAMUSCULAR; INTRAVENOUS ONCE
Refills: 0 | Status: COMPLETED | OUTPATIENT
Start: 2023-01-01 | End: 2023-01-01

## 2023-01-01 RX ORDER — LEVETIRACETAM 250 MG/1
500 TABLET, FILM COATED ORAL EVERY 12 HOURS
Refills: 0 | Status: DISCONTINUED | OUTPATIENT
Start: 2023-01-01 | End: 2023-01-01

## 2023-01-01 RX ORDER — COLLAGENASE CLOSTRIDIUM HIST. 250 UNIT/G
1 OINTMENT (GRAM) TOPICAL ONCE
Refills: 0 | Status: DISCONTINUED | OUTPATIENT
Start: 2023-01-01 | End: 2023-01-01

## 2023-01-01 RX ORDER — ERYTHROPOIETIN 10000 [IU]/ML
1 INJECTION, SOLUTION INTRAVENOUS; SUBCUTANEOUS
Refills: 0 | DISCHARGE

## 2023-01-01 RX ORDER — FUROSEMIDE 40 MG
80 TABLET ORAL ONCE
Refills: 0 | Status: COMPLETED | OUTPATIENT
Start: 2023-01-01 | End: 2023-01-01

## 2023-01-01 RX ORDER — VALPROIC ACID (AS SODIUM SALT) 250 MG/5ML
3000 SOLUTION, ORAL ORAL ONCE
Refills: 0 | Status: COMPLETED | OUTPATIENT
Start: 2023-01-01 | End: 2023-01-01

## 2023-01-01 RX ORDER — SODIUM,POTASSIUM PHOSPHATES 278-250MG
1 POWDER IN PACKET (EA) ORAL THREE TIMES A DAY
Refills: 0 | Status: COMPLETED | OUTPATIENT
Start: 2023-01-01 | End: 2023-01-01

## 2023-01-01 RX ORDER — VALPROIC ACID (AS SODIUM SALT) 250 MG/5ML
2000 SOLUTION, ORAL ORAL ONCE
Refills: 0 | Status: COMPLETED | OUTPATIENT
Start: 2023-01-01 | End: 2023-01-01

## 2023-01-01 RX ORDER — POTASSIUM CHLORIDE 20 MEQ
40 PACKET (EA) ORAL EVERY 12 HOURS
Refills: 0 | Status: DISCONTINUED | OUTPATIENT
Start: 2023-01-01 | End: 2023-01-01

## 2023-01-01 RX ORDER — SODIUM CHLORIDE 9 MG/ML
250 INJECTION, SOLUTION INTRAVENOUS ONCE
Refills: 0 | Status: COMPLETED | OUTPATIENT
Start: 2023-01-01 | End: 2023-01-01

## 2023-01-01 RX ORDER — ERGOCALCIFEROL 1.25 MG/1
4000 CAPSULE ORAL EVERY 24 HOURS
Refills: 0 | Status: DISCONTINUED | OUTPATIENT
Start: 2023-01-01 | End: 2023-01-01

## 2023-01-01 RX ORDER — DARBEPOETIN ALFA IN POLYSORBAT 200MCG/0.4
40 PEN INJECTOR (ML) SUBCUTANEOUS
Refills: 0 | Status: DISCONTINUED | OUTPATIENT
Start: 2023-01-01 | End: 2023-01-01

## 2023-01-01 RX ORDER — POLYETHYLENE GLYCOL 3350 17 G/17G
17 POWDER, FOR SOLUTION ORAL
Qty: 1 | Refills: 0
Start: 2023-01-01 | End: 2023-08-18

## 2023-01-01 RX ORDER — FOLIC ACID 0.8 MG
1 TABLET ORAL DAILY
Refills: 0 | Status: DISCONTINUED | OUTPATIENT
Start: 2023-01-01 | End: 2023-01-01

## 2023-01-01 RX ORDER — CHLORHEXIDINE GLUCONATE 213 G/1000ML
15 SOLUTION TOPICAL
Refills: 0 | DISCHARGE

## 2023-01-01 RX ORDER — CEFTOLOZANE AND TAZOBACTAM 1; .5 G/10ML; G/10ML
2250 INJECTION, POWDER, LYOPHILIZED, FOR SOLUTION INTRAVENOUS ONCE
Refills: 0 | Status: COMPLETED | OUTPATIENT
Start: 2023-01-01 | End: 2023-01-01

## 2023-01-01 RX ORDER — VANCOMYCIN HCL 1 G
1000 VIAL (EA) INTRAVENOUS
Refills: 0 | Status: DISCONTINUED | OUTPATIENT
Start: 2023-01-01 | End: 2023-01-01

## 2023-01-01 RX ORDER — ERYTHROPOIETIN 10000 [IU]/ML
40000 INJECTION, SOLUTION INTRAVENOUS; SUBCUTANEOUS
Refills: 0 | DISCHARGE

## 2023-01-01 RX ORDER — MIDODRINE HYDROCHLORIDE 2.5 MG/1
1 TABLET ORAL
Qty: 0 | Refills: 0 | DISCHARGE

## 2023-01-01 RX ORDER — POLYETHYLENE GLYCOL 3350 17 G/17G
17 POWDER, FOR SOLUTION ORAL
Refills: 0 | DISCHARGE

## 2023-01-01 RX ORDER — POLYETHYLENE GLYCOL 3350 17 G/17G
17 POWDER, FOR SOLUTION ORAL
Refills: 0 | Status: DISCONTINUED | OUTPATIENT
Start: 2023-01-01 | End: 2023-01-01

## 2023-01-01 RX ORDER — SODIUM BICARBONATE 1 MEQ/ML
1300 SYRINGE (ML) INTRAVENOUS EVERY 8 HOURS
Refills: 0 | Status: DISCONTINUED | OUTPATIENT
Start: 2023-01-01 | End: 2023-01-01

## 2023-01-01 RX ORDER — IPRATROPIUM BROMIDE 0.2 MG/ML
1 SOLUTION, NON-ORAL INHALATION
Qty: 0 | Refills: 0 | DISCHARGE

## 2023-01-01 RX ORDER — FLUCONAZOLE 150 MG/1
200 TABLET ORAL EVERY 24 HOURS
Refills: 0 | Status: DISCONTINUED | OUTPATIENT
Start: 2023-01-01 | End: 2023-01-01

## 2023-01-01 RX ORDER — DOXAZOSIN MESYLATE 4 MG
1 TABLET ORAL
Refills: 0 | DISCHARGE

## 2023-01-01 RX ORDER — FAMOTIDINE 10 MG/ML
1 INJECTION INTRAVENOUS
Qty: 0 | Refills: 0 | DISCHARGE

## 2023-01-01 RX ORDER — AMIODARONE HYDROCHLORIDE 400 MG/1
0.5 TABLET ORAL
Qty: 450 | Refills: 0 | Status: DISCONTINUED | OUTPATIENT
Start: 2023-01-01 | End: 2023-01-01

## 2023-01-01 RX ORDER — ACETAMINOPHEN 500 MG
2 TABLET ORAL
Qty: 0 | Refills: 0 | DISCHARGE
Start: 2023-01-01

## 2023-01-01 RX ORDER — ERGOCALCIFEROL 1.25 MG/1
1000 CAPSULE ORAL DAILY
Refills: 0 | Status: DISCONTINUED | OUTPATIENT
Start: 2023-01-01 | End: 2023-01-01

## 2023-01-01 RX ORDER — FLUCONAZOLE 150 MG/1
800 TABLET ORAL ONCE
Refills: 0 | Status: COMPLETED | OUTPATIENT
Start: 2023-01-01 | End: 2023-01-01

## 2023-01-01 RX ORDER — PANTOPRAZOLE SODIUM 20 MG/1
40 TABLET, DELAYED RELEASE ORAL DAILY
Refills: 0 | Status: DISCONTINUED | OUTPATIENT
Start: 2023-01-01 | End: 2023-01-01

## 2023-01-01 RX ORDER — SODIUM ZIRCONIUM CYCLOSILICATE 10 G/10G
10 POWDER, FOR SUSPENSION ORAL EVERY 8 HOURS
Refills: 0 | Status: COMPLETED | OUTPATIENT
Start: 2023-01-01 | End: 2023-01-01

## 2023-01-01 RX ORDER — LACOSAMIDE 50 MG/1
100 TABLET ORAL EVERY 12 HOURS
Refills: 0 | Status: DISCONTINUED | OUTPATIENT
Start: 2023-01-01 | End: 2023-01-01

## 2023-01-01 RX ORDER — AMIODARONE HYDROCHLORIDE 400 MG/1
1 TABLET ORAL
Qty: 450 | Refills: 0 | Status: DISCONTINUED | OUTPATIENT
Start: 2023-01-01 | End: 2023-01-01

## 2023-01-01 RX ORDER — LEVETIRACETAM 250 MG/1
5 TABLET, FILM COATED ORAL
Qty: 0 | Refills: 0 | DISCHARGE

## 2023-01-01 RX ORDER — ZINC SULFATE TAB 220 MG (50 MG ZINC EQUIVALENT) 220 (50 ZN) MG
1 TAB ORAL
Refills: 0 | DISCHARGE

## 2023-01-01 RX ORDER — AZTREONAM 2 G
2000 VIAL (EA) INJECTION ONCE
Refills: 0 | Status: DISCONTINUED | OUTPATIENT
Start: 2023-01-01 | End: 2023-01-01

## 2023-01-01 RX ORDER — SODIUM CHLORIDE 9 MG/ML
500 INJECTION, SOLUTION INTRAVENOUS
Refills: 0 | Status: DISCONTINUED | OUTPATIENT
Start: 2023-01-01 | End: 2023-01-01

## 2023-01-01 RX ORDER — PROPOFOL 10 MG/ML
100 INJECTION, EMULSION INTRAVENOUS ONCE
Refills: 0 | Status: DISCONTINUED | OUTPATIENT
Start: 2023-01-01 | End: 2023-01-01

## 2023-01-01 RX ORDER — HYDROMORPHONE HYDROCHLORIDE 2 MG/ML
0.5 INJECTION INTRAMUSCULAR; INTRAVENOUS; SUBCUTANEOUS ONCE
Refills: 0 | Status: DISCONTINUED | OUTPATIENT
Start: 2023-01-01 | End: 2023-01-01

## 2023-01-01 RX ORDER — VALPROIC ACID (AS SODIUM SALT) 250 MG/5ML
500 SOLUTION, ORAL ORAL
Refills: 0 | Status: DISCONTINUED | OUTPATIENT
Start: 2023-01-01 | End: 2023-01-01

## 2023-01-01 RX ORDER — GENTAMICIN SULFATE 40 MG/ML
180 VIAL (ML) INJECTION ONCE
Refills: 0 | Status: DISCONTINUED | OUTPATIENT
Start: 2023-01-01 | End: 2023-01-01

## 2023-01-01 RX ORDER — DARBEPOETIN ALFA IN POLYSORBAT 200MCG/0.4
25 PEN INJECTOR (ML) SUBCUTANEOUS
Refills: 0 | Status: DISCONTINUED | OUTPATIENT
Start: 2023-01-01 | End: 2023-01-01

## 2023-01-01 RX ORDER — PANTOPRAZOLE SODIUM 20 MG/1
8 TABLET, DELAYED RELEASE ORAL
Qty: 80 | Refills: 0 | Status: DISCONTINUED | OUTPATIENT
Start: 2023-01-01 | End: 2023-01-01

## 2023-01-01 RX ORDER — COLLAGENASE CLOSTRIDIUM HIST. 250 UNIT/G
1 OINTMENT (GRAM) TOPICAL
Qty: 0 | Refills: 0 | DISCHARGE
Start: 2023-01-01

## 2023-01-01 RX ORDER — GENTAMICIN SULFATE 40 MG/ML
VIAL (ML) INJECTION
Refills: 0 | Status: DISCONTINUED | OUTPATIENT
Start: 2023-01-01 | End: 2023-01-01

## 2023-01-01 RX ORDER — SODIUM HYPOCHLORITE 0.125 %
1 SOLUTION, NON-ORAL MISCELLANEOUS DAILY
Refills: 0 | Status: DISCONTINUED | OUTPATIENT
Start: 2023-01-01 | End: 2023-01-01

## 2023-01-01 RX ORDER — LACTULOSE 10 G/15ML
20 SOLUTION ORAL EVERY 8 HOURS
Refills: 0 | Status: COMPLETED | OUTPATIENT
Start: 2023-01-01 | End: 2023-01-01

## 2023-01-01 RX ORDER — PANTOPRAZOLE SODIUM 20 MG/1
1 TABLET, DELAYED RELEASE ORAL
Qty: 0 | Refills: 0 | DISCHARGE
Start: 2023-01-01

## 2023-01-01 RX ORDER — NOREPINEPHRINE BITARTRATE/D5W 8 MG/250ML
0.05 PLASTIC BAG, INJECTION (ML) INTRAVENOUS
Qty: 32 | Refills: 0 | Status: DISCONTINUED | OUTPATIENT
Start: 2023-01-01 | End: 2023-01-01

## 2023-01-01 RX ORDER — PANTOPRAZOLE SODIUM 20 MG/1
80 TABLET, DELAYED RELEASE ORAL ONCE
Refills: 0 | Status: COMPLETED | OUTPATIENT
Start: 2023-01-01 | End: 2023-01-01

## 2023-01-01 RX ORDER — DOXAZOSIN MESYLATE 4 MG
4 TABLET ORAL AT BEDTIME
Refills: 0 | Status: DISCONTINUED | OUTPATIENT
Start: 2023-01-01 | End: 2023-01-01

## 2023-01-01 RX ORDER — CLONAZEPAM 1 MG
1 TABLET ORAL THREE TIMES A DAY
Refills: 0 | Status: DISCONTINUED | OUTPATIENT
Start: 2023-01-01 | End: 2023-01-01

## 2023-01-01 RX ORDER — AMIODARONE HYDROCHLORIDE 400 MG/1
150 TABLET ORAL ONCE
Refills: 0 | Status: COMPLETED | OUTPATIENT
Start: 2023-01-01 | End: 2023-01-01

## 2023-01-01 RX ORDER — SODIUM,POTASSIUM PHOSPHATES 278-250MG
1 POWDER IN PACKET (EA) ORAL EVERY 12 HOURS
Refills: 0 | Status: COMPLETED | OUTPATIENT
Start: 2023-01-01 | End: 2023-01-01

## 2023-01-01 RX ORDER — ROBINUL 0.2 MG/ML
0.2 INJECTION INTRAMUSCULAR; INTRAVENOUS EVERY 6 HOURS
Refills: 0 | Status: DISCONTINUED | OUTPATIENT
Start: 2023-01-01 | End: 2023-01-01

## 2023-01-01 RX ORDER — INSULIN GLARGINE 100 [IU]/ML
5 INJECTION, SOLUTION SUBCUTANEOUS AT BEDTIME
Refills: 0 | Status: DISCONTINUED | OUTPATIENT
Start: 2023-01-01 | End: 2023-01-01

## 2023-01-01 RX ORDER — INSULIN LISPRO 100/ML
VIAL (ML) SUBCUTANEOUS
Refills: 0 | Status: DISCONTINUED | OUTPATIENT
Start: 2023-01-01 | End: 2023-01-01

## 2023-01-01 RX ORDER — MIDODRINE HYDROCHLORIDE 2.5 MG/1
10 TABLET ORAL ONCE
Refills: 0 | Status: COMPLETED | OUTPATIENT
Start: 2023-01-01 | End: 2023-01-01

## 2023-01-01 RX ORDER — SODIUM ZIRCONIUM CYCLOSILICATE 10 G/10G
10 POWDER, FOR SUSPENSION ORAL EVERY 8 HOURS
Refills: 0 | Status: DISCONTINUED | OUTPATIENT
Start: 2023-01-01 | End: 2023-01-01

## 2023-01-01 RX ORDER — SODIUM ZIRCONIUM CYCLOSILICATE 10 G/10G
5 POWDER, FOR SUSPENSION ORAL
Qty: 0 | Refills: 0 | DISCHARGE
Start: 2023-01-01

## 2023-01-01 RX ORDER — MIDODRINE HYDROCHLORIDE 2.5 MG/1
2.5 TABLET ORAL THREE TIMES A DAY
Refills: 0 | Status: DISCONTINUED | OUTPATIENT
Start: 2023-01-01 | End: 2023-01-01

## 2023-01-01 RX ORDER — MEROPENEM 1 G/30ML
1000 INJECTION INTRAVENOUS ONCE
Refills: 0 | Status: DISCONTINUED | OUTPATIENT
Start: 2023-01-01 | End: 2023-01-01

## 2023-01-01 RX ORDER — HYDROMORPHONE HYDROCHLORIDE 2 MG/ML
0.5 INJECTION INTRAMUSCULAR; INTRAVENOUS; SUBCUTANEOUS
Refills: 0 | Status: DISCONTINUED | OUTPATIENT
Start: 2023-01-01 | End: 2023-01-01

## 2023-01-01 RX ORDER — AZTREONAM 2 G
2000 VIAL (EA) INJECTION ONCE
Refills: 0 | Status: COMPLETED | OUTPATIENT
Start: 2023-01-01 | End: 2023-01-01

## 2023-01-01 RX ORDER — PROPOFOL 10 MG/ML
40 INJECTION, EMULSION INTRAVENOUS
Qty: 500 | Refills: 0 | Status: DISCONTINUED | OUTPATIENT
Start: 2023-01-01 | End: 2023-01-01

## 2023-01-01 RX ORDER — INSULIN LISPRO 100/ML
VIAL (ML) SUBCUTANEOUS AT BEDTIME
Refills: 0 | Status: DISCONTINUED | OUTPATIENT
Start: 2023-01-01 | End: 2023-01-01

## 2023-01-01 RX ADMIN — SODIUM ZIRCONIUM CYCLOSILICATE 10 GRAM(S): 10 POWDER, FOR SUSPENSION ORAL at 17:09

## 2023-01-01 RX ADMIN — Medication 1 APPLICATION(S): at 05:12

## 2023-01-01 RX ADMIN — ATORVASTATIN CALCIUM 80 MILLIGRAM(S): 80 TABLET, FILM COATED ORAL at 23:12

## 2023-01-01 RX ADMIN — CHLORHEXIDINE GLUCONATE 15 MILLILITER(S): 213 SOLUTION TOPICAL at 17:04

## 2023-01-01 RX ADMIN — Medication 1 APPLICATION(S): at 19:08

## 2023-01-01 RX ADMIN — Medication 1 APPLICATION(S): at 17:59

## 2023-01-01 RX ADMIN — CHLORHEXIDINE GLUCONATE 15 MILLILITER(S): 213 SOLUTION TOPICAL at 17:28

## 2023-01-01 RX ADMIN — Medication 500 MILLIGRAM(S): at 15:47

## 2023-01-01 RX ADMIN — CHLORHEXIDINE GLUCONATE 15 MILLILITER(S): 213 SOLUTION TOPICAL at 17:52

## 2023-01-01 RX ADMIN — ERYTHROPOIETIN 40000 UNIT(S): 10000 INJECTION, SOLUTION INTRAVENOUS; SUBCUTANEOUS at 14:22

## 2023-01-01 RX ADMIN — CHLORHEXIDINE GLUCONATE 1 APPLICATION(S): 213 SOLUTION TOPICAL at 06:17

## 2023-01-01 RX ADMIN — CHLORHEXIDINE GLUCONATE 15 MILLILITER(S): 213 SOLUTION TOPICAL at 05:16

## 2023-01-01 RX ADMIN — CHLORHEXIDINE GLUCONATE 15 MILLILITER(S): 213 SOLUTION TOPICAL at 19:06

## 2023-01-01 RX ADMIN — CHLORHEXIDINE GLUCONATE 1 APPLICATION(S): 213 SOLUTION TOPICAL at 05:42

## 2023-01-01 RX ADMIN — LACOSAMIDE 100 MILLIGRAM(S): 50 TABLET ORAL at 05:39

## 2023-01-01 RX ADMIN — LEVETIRACETAM 500 MILLIGRAM(S): 250 TABLET, FILM COATED ORAL at 06:00

## 2023-01-01 RX ADMIN — HEPARIN SODIUM 5000 UNIT(S): 5000 INJECTION INTRAVENOUS; SUBCUTANEOUS at 17:04

## 2023-01-01 RX ADMIN — Medication 0.5 MILLIGRAM(S): at 20:17

## 2023-01-01 RX ADMIN — MIDODRINE HYDROCHLORIDE 10 MILLIGRAM(S): 2.5 TABLET ORAL at 05:13

## 2023-01-01 RX ADMIN — CHLORHEXIDINE GLUCONATE 15 MILLILITER(S): 213 SOLUTION TOPICAL at 05:28

## 2023-01-01 RX ADMIN — LACOSAMIDE 100 MILLIGRAM(S): 50 TABLET ORAL at 17:47

## 2023-01-01 RX ADMIN — PANTOPRAZOLE SODIUM 40 MILLIGRAM(S): 20 TABLET, DELAYED RELEASE ORAL at 18:24

## 2023-01-01 RX ADMIN — BUMETANIDE 2 MILLIGRAM(S): 0.25 INJECTION INTRAMUSCULAR; INTRAVENOUS at 17:46

## 2023-01-01 RX ADMIN — LEVETIRACETAM 500 MILLIGRAM(S): 250 TABLET, FILM COATED ORAL at 18:20

## 2023-01-01 RX ADMIN — BUMETANIDE 2 MILLIGRAM(S): 0.25 INJECTION INTRAMUSCULAR; INTRAVENOUS at 05:33

## 2023-01-01 RX ADMIN — Medication 1 PACKET(S): at 05:33

## 2023-01-01 RX ADMIN — MIDODRINE HYDROCHLORIDE 10 MILLIGRAM(S): 2.5 TABLET ORAL at 06:18

## 2023-01-01 RX ADMIN — AMIODARONE HYDROCHLORIDE 200 MILLIGRAM(S): 400 TABLET ORAL at 05:02

## 2023-01-01 RX ADMIN — Medication 3 MILLILITER(S): at 07:45

## 2023-01-01 RX ADMIN — CHLORHEXIDINE GLUCONATE 15 MILLILITER(S): 213 SOLUTION TOPICAL at 05:40

## 2023-01-01 RX ADMIN — PANTOPRAZOLE SODIUM 40 MILLIGRAM(S): 20 TABLET, DELAYED RELEASE ORAL at 11:14

## 2023-01-01 RX ADMIN — MIDODRINE HYDROCHLORIDE 10 MILLIGRAM(S): 2.5 TABLET ORAL at 05:33

## 2023-01-01 RX ADMIN — AMIODARONE HYDROCHLORIDE 200 MILLIGRAM(S): 400 TABLET ORAL at 05:52

## 2023-01-01 RX ADMIN — HEPARIN SODIUM 5000 UNIT(S): 5000 INJECTION INTRAVENOUS; SUBCUTANEOUS at 05:10

## 2023-01-01 RX ADMIN — Medication 1 APPLICATION(S): at 17:34

## 2023-01-01 RX ADMIN — Medication 1 APPLICATION(S): at 05:05

## 2023-01-01 RX ADMIN — PANTOPRAZOLE SODIUM 40 MILLIGRAM(S): 20 TABLET, DELAYED RELEASE ORAL at 18:32

## 2023-01-01 RX ADMIN — CHLORHEXIDINE GLUCONATE 15 MILLILITER(S): 213 SOLUTION TOPICAL at 05:19

## 2023-01-01 RX ADMIN — Medication 1 APPLICATION(S): at 17:39

## 2023-01-01 RX ADMIN — LEVETIRACETAM 500 MILLIGRAM(S): 250 TABLET, FILM COATED ORAL at 17:31

## 2023-01-01 RX ADMIN — Medication 1: at 09:16

## 2023-01-01 RX ADMIN — CHLORHEXIDINE GLUCONATE 1 APPLICATION(S): 213 SOLUTION TOPICAL at 06:15

## 2023-01-01 RX ADMIN — Medication 81 MILLIGRAM(S): at 14:18

## 2023-01-01 RX ADMIN — Medication 81 MILLIGRAM(S): at 12:22

## 2023-01-01 RX ADMIN — HEPARIN SODIUM 5000 UNIT(S): 5000 INJECTION INTRAVENOUS; SUBCUTANEOUS at 17:46

## 2023-01-01 RX ADMIN — PANTOPRAZOLE SODIUM 40 MILLIGRAM(S): 20 TABLET, DELAYED RELEASE ORAL at 17:27

## 2023-01-01 RX ADMIN — LEVETIRACETAM 500 MILLIGRAM(S): 250 TABLET, FILM COATED ORAL at 18:02

## 2023-01-01 RX ADMIN — Medication 1 APPLICATION(S): at 17:48

## 2023-01-01 RX ADMIN — Medication 3 MILLILITER(S): at 08:11

## 2023-01-01 RX ADMIN — Medication 2 MILLIGRAM(S): at 22:03

## 2023-01-01 RX ADMIN — HEPARIN SODIUM 5000 UNIT(S): 5000 INJECTION INTRAVENOUS; SUBCUTANEOUS at 18:22

## 2023-01-01 RX ADMIN — Medication 81 MILLIGRAM(S): at 12:11

## 2023-01-01 RX ADMIN — POLYETHYLENE GLYCOL 3350 17 GRAM(S): 17 POWDER, FOR SOLUTION ORAL at 11:40

## 2023-01-01 RX ADMIN — Medication 1 APPLICATION(S): at 18:12

## 2023-01-01 RX ADMIN — Medication 1 APPLICATION(S): at 17:32

## 2023-01-01 RX ADMIN — FLUCONAZOLE 50 MILLIGRAM(S): 150 TABLET ORAL at 17:23

## 2023-01-01 RX ADMIN — AMIODARONE HYDROCHLORIDE 400 MILLIGRAM(S): 400 TABLET ORAL at 14:06

## 2023-01-01 RX ADMIN — Medication 1300 MILLIGRAM(S): at 15:48

## 2023-01-01 RX ADMIN — CEFTOLOZANE AND TAZOBACTAM 100 MILLIGRAM(S): 1; .5 INJECTION, POWDER, LYOPHILIZED, FOR SOLUTION INTRAVENOUS at 05:05

## 2023-01-01 RX ADMIN — ATORVASTATIN CALCIUM 80 MILLIGRAM(S): 80 TABLET, FILM COATED ORAL at 21:47

## 2023-01-01 RX ADMIN — BUMETANIDE 2 MILLIGRAM(S): 0.25 INJECTION INTRAMUSCULAR; INTRAVENOUS at 17:18

## 2023-01-01 RX ADMIN — CHLORHEXIDINE GLUCONATE 15 MILLILITER(S): 213 SOLUTION TOPICAL at 17:27

## 2023-01-01 RX ADMIN — PANTOPRAZOLE SODIUM 40 MILLIGRAM(S): 20 TABLET, DELAYED RELEASE ORAL at 17:41

## 2023-01-01 RX ADMIN — ATORVASTATIN CALCIUM 80 MILLIGRAM(S): 80 TABLET, FILM COATED ORAL at 21:48

## 2023-01-01 RX ADMIN — HEPARIN SODIUM 5000 UNIT(S): 5000 INJECTION INTRAVENOUS; SUBCUTANEOUS at 05:59

## 2023-01-01 RX ADMIN — Medication 1: at 12:17

## 2023-01-01 RX ADMIN — Medication 500 MILLIGRAM(S): at 11:41

## 2023-01-01 RX ADMIN — Medication 1 APPLICATION(S): at 06:14

## 2023-01-01 RX ADMIN — Medication 8.78 MICROGRAM(S)/KG/MIN: at 07:46

## 2023-01-01 RX ADMIN — HEPARIN SODIUM 5000 UNIT(S): 5000 INJECTION INTRAVENOUS; SUBCUTANEOUS at 19:07

## 2023-01-01 RX ADMIN — PANTOPRAZOLE SODIUM 40 MILLIGRAM(S): 20 TABLET, DELAYED RELEASE ORAL at 05:53

## 2023-01-01 RX ADMIN — LEVETIRACETAM 500 MILLIGRAM(S): 250 TABLET, FILM COATED ORAL at 17:38

## 2023-01-01 RX ADMIN — LEVETIRACETAM 500 MILLIGRAM(S): 250 TABLET, FILM COATED ORAL at 18:53

## 2023-01-01 RX ADMIN — PANTOPRAZOLE SODIUM 40 MILLIGRAM(S): 20 TABLET, DELAYED RELEASE ORAL at 05:23

## 2023-01-01 RX ADMIN — AMIODARONE HYDROCHLORIDE 16.7 MG/MIN: 400 TABLET ORAL at 04:17

## 2023-01-01 RX ADMIN — ERYTHROPOIETIN 40000 UNIT(S): 10000 INJECTION, SOLUTION INTRAVENOUS; SUBCUTANEOUS at 14:49

## 2023-01-01 RX ADMIN — HEPARIN SODIUM 5000 UNIT(S): 5000 INJECTION INTRAVENOUS; SUBCUTANEOUS at 05:21

## 2023-01-01 RX ADMIN — HEPARIN SODIUM 5000 UNIT(S): 5000 INJECTION INTRAVENOUS; SUBCUTANEOUS at 17:37

## 2023-01-01 RX ADMIN — ATORVASTATIN CALCIUM 80 MILLIGRAM(S): 80 TABLET, FILM COATED ORAL at 21:21

## 2023-01-01 RX ADMIN — MIDODRINE HYDROCHLORIDE 10 MILLIGRAM(S): 2.5 TABLET ORAL at 06:09

## 2023-01-01 RX ADMIN — Medication 650 MILLIGRAM(S): at 05:37

## 2023-01-01 RX ADMIN — Medication 3 MILLILITER(S): at 08:28

## 2023-01-01 RX ADMIN — CHLORHEXIDINE GLUCONATE 15 MILLILITER(S): 213 SOLUTION TOPICAL at 17:39

## 2023-01-01 RX ADMIN — CHLORHEXIDINE GLUCONATE 1 APPLICATION(S): 213 SOLUTION TOPICAL at 05:10

## 2023-01-01 RX ADMIN — AMIODARONE HYDROCHLORIDE 200 MILLIGRAM(S): 400 TABLET ORAL at 05:06

## 2023-01-01 RX ADMIN — Medication 3 MILLILITER(S): at 09:48

## 2023-01-01 RX ADMIN — Medication 81 MILLIGRAM(S): at 11:15

## 2023-01-01 RX ADMIN — Medication 1: at 17:49

## 2023-01-01 RX ADMIN — ATORVASTATIN CALCIUM 80 MILLIGRAM(S): 80 TABLET, FILM COATED ORAL at 21:45

## 2023-01-01 RX ADMIN — Medication 1 TABLET(S): at 11:43

## 2023-01-01 RX ADMIN — AMIODARONE HYDROCHLORIDE 400 MILLIGRAM(S): 400 TABLET ORAL at 06:13

## 2023-01-01 RX ADMIN — Medication 1: at 12:00

## 2023-01-01 RX ADMIN — Medication 100 MILLIEQUIVALENT(S): at 05:22

## 2023-01-01 RX ADMIN — BUMETANIDE 2 MILLIGRAM(S): 0.25 INJECTION INTRAMUSCULAR; INTRAVENOUS at 05:25

## 2023-01-01 RX ADMIN — Medication 1 APPLICATION(S): at 18:30

## 2023-01-01 RX ADMIN — AMIODARONE HYDROCHLORIDE 200 MILLIGRAM(S): 400 TABLET ORAL at 06:01

## 2023-01-01 RX ADMIN — Medication 300 MILLIGRAM(S): at 11:55

## 2023-01-01 RX ADMIN — PANTOPRAZOLE SODIUM 40 MILLIGRAM(S): 20 TABLET, DELAYED RELEASE ORAL at 18:22

## 2023-01-01 RX ADMIN — Medication 250 MILLIGRAM(S): at 01:11

## 2023-01-01 RX ADMIN — CEFEPIME 100 MILLIGRAM(S): 1 INJECTION, POWDER, FOR SOLUTION INTRAMUSCULAR; INTRAVENOUS at 18:32

## 2023-01-01 RX ADMIN — CHLORHEXIDINE GLUCONATE 15 MILLILITER(S): 213 SOLUTION TOPICAL at 05:49

## 2023-01-01 RX ADMIN — Medication 3 MILLILITER(S): at 09:10

## 2023-01-01 RX ADMIN — PANTOPRAZOLE SODIUM 40 MILLIGRAM(S): 20 TABLET, DELAYED RELEASE ORAL at 18:15

## 2023-01-01 RX ADMIN — BUMETANIDE 2 MILLIGRAM(S): 0.25 INJECTION INTRAMUSCULAR; INTRAVENOUS at 05:06

## 2023-01-01 RX ADMIN — LEVETIRACETAM 500 MILLIGRAM(S): 250 TABLET, FILM COATED ORAL at 06:18

## 2023-01-01 RX ADMIN — HEPARIN SODIUM 5000 UNIT(S): 5000 INJECTION INTRAVENOUS; SUBCUTANEOUS at 05:47

## 2023-01-01 RX ADMIN — LEVETIRACETAM 500 MILLIGRAM(S): 250 TABLET, FILM COATED ORAL at 06:03

## 2023-01-01 RX ADMIN — Medication 81 MILLIGRAM(S): at 13:04

## 2023-01-01 RX ADMIN — INSULIN GLARGINE 10 UNIT(S): 100 INJECTION, SOLUTION SUBCUTANEOUS at 21:46

## 2023-01-01 RX ADMIN — Medication 1 APPLICATION(S): at 05:53

## 2023-01-01 RX ADMIN — LEVETIRACETAM 500 MILLIGRAM(S): 250 TABLET, FILM COATED ORAL at 17:24

## 2023-01-01 RX ADMIN — Medication 50 MILLILITER(S): at 22:49

## 2023-01-01 RX ADMIN — CHLORHEXIDINE GLUCONATE 15 MILLILITER(S): 213 SOLUTION TOPICAL at 05:37

## 2023-01-01 RX ADMIN — Medication 4 MILLIGRAM(S): at 16:54

## 2023-01-01 RX ADMIN — POLYETHYLENE GLYCOL 3350 17 GRAM(S): 17 POWDER, FOR SOLUTION ORAL at 12:06

## 2023-01-01 RX ADMIN — CHLORHEXIDINE GLUCONATE 15 MILLILITER(S): 213 SOLUTION TOPICAL at 05:05

## 2023-01-01 RX ADMIN — CHLORHEXIDINE GLUCONATE 1 APPLICATION(S): 213 SOLUTION TOPICAL at 05:26

## 2023-01-01 RX ADMIN — Medication 1 MILLIGRAM(S): at 11:43

## 2023-01-01 RX ADMIN — CEFTOLOZANE AND TAZOBACTAM 100 MILLIGRAM(S): 1; .5 INJECTION, POWDER, LYOPHILIZED, FOR SOLUTION INTRAVENOUS at 14:18

## 2023-01-01 RX ADMIN — BUMETANIDE 2 MILLIGRAM(S): 0.25 INJECTION INTRAMUSCULAR; INTRAVENOUS at 05:54

## 2023-01-01 RX ADMIN — FLUCONAZOLE 50 MILLIGRAM(S): 150 TABLET ORAL at 17:01

## 2023-01-01 RX ADMIN — PANTOPRAZOLE SODIUM 40 MILLIGRAM(S): 20 TABLET, DELAYED RELEASE ORAL at 05:47

## 2023-01-01 RX ADMIN — Medication 1 TABLET(S): at 12:43

## 2023-01-01 RX ADMIN — ATORVASTATIN CALCIUM 80 MILLIGRAM(S): 80 TABLET, FILM COATED ORAL at 21:31

## 2023-01-01 RX ADMIN — PANTOPRAZOLE SODIUM 40 MILLIGRAM(S): 20 TABLET, DELAYED RELEASE ORAL at 05:15

## 2023-01-01 RX ADMIN — CHLORHEXIDINE GLUCONATE 15 MILLILITER(S): 213 SOLUTION TOPICAL at 17:36

## 2023-01-01 RX ADMIN — Medication 4 MILLIGRAM(S): at 22:40

## 2023-01-01 RX ADMIN — Medication 50 MILLIGRAM(S): at 05:09

## 2023-01-01 RX ADMIN — HEPARIN SODIUM 5000 UNIT(S): 5000 INJECTION INTRAVENOUS; SUBCUTANEOUS at 05:39

## 2023-01-01 RX ADMIN — INSULIN GLARGINE 10 UNIT(S): 100 INJECTION, SOLUTION SUBCUTANEOUS at 21:36

## 2023-01-01 RX ADMIN — Medication 1 MILLIGRAM(S): at 11:16

## 2023-01-01 RX ADMIN — ATORVASTATIN CALCIUM 80 MILLIGRAM(S): 80 TABLET, FILM COATED ORAL at 21:35

## 2023-01-01 RX ADMIN — INSULIN HUMAN 10 UNIT(S)/HR: 100 INJECTION, SOLUTION SUBCUTANEOUS at 17:25

## 2023-01-01 RX ADMIN — Medication 1 APPLICATION(S): at 17:17

## 2023-01-01 RX ADMIN — FLUCONAZOLE 200 MILLIGRAM(S): 150 TABLET ORAL at 21:30

## 2023-01-01 RX ADMIN — Medication 1 APPLICATION(S): at 18:29

## 2023-01-01 RX ADMIN — CHLORHEXIDINE GLUCONATE 15 MILLILITER(S): 213 SOLUTION TOPICAL at 19:53

## 2023-01-01 RX ADMIN — Medication 0: at 23:11

## 2023-01-01 RX ADMIN — Medication 1 APPLICATION(S): at 05:55

## 2023-01-01 RX ADMIN — Medication 81 MILLIGRAM(S): at 11:22

## 2023-01-01 RX ADMIN — Medication 5 MILLIGRAM(S): at 22:03

## 2023-01-01 RX ADMIN — Medication 1 APPLICATION(S): at 18:55

## 2023-01-01 RX ADMIN — CEFTOLOZANE AND TAZOBACTAM 100 MILLIGRAM(S): 1; .5 INJECTION, POWDER, LYOPHILIZED, FOR SOLUTION INTRAVENOUS at 06:07

## 2023-01-01 RX ADMIN — CHLORHEXIDINE GLUCONATE 1 APPLICATION(S): 213 SOLUTION TOPICAL at 05:19

## 2023-01-01 RX ADMIN — Medication 300 MILLIGRAM(S): at 11:48

## 2023-01-01 RX ADMIN — LEVETIRACETAM 500 MILLIGRAM(S): 250 TABLET, FILM COATED ORAL at 05:52

## 2023-01-01 RX ADMIN — SODIUM ZIRCONIUM CYCLOSILICATE 5 GRAM(S): 10 POWDER, FOR SUSPENSION ORAL at 12:22

## 2023-01-01 RX ADMIN — CHLORHEXIDINE GLUCONATE 15 MILLILITER(S): 213 SOLUTION TOPICAL at 17:44

## 2023-01-01 RX ADMIN — Medication 5 MILLIGRAM(S): at 05:40

## 2023-01-01 RX ADMIN — MIDODRINE HYDROCHLORIDE 10 MILLIGRAM(S): 2.5 TABLET ORAL at 05:41

## 2023-01-01 RX ADMIN — Medication 81 MILLIGRAM(S): at 11:51

## 2023-01-01 RX ADMIN — SODIUM ZIRCONIUM CYCLOSILICATE 5 GRAM(S): 10 POWDER, FOR SUSPENSION ORAL at 13:18

## 2023-01-01 RX ADMIN — FLUCONAZOLE 50 MILLIGRAM(S): 150 TABLET ORAL at 19:14

## 2023-01-01 RX ADMIN — Medication 300 MILLIGRAM(S): at 11:42

## 2023-01-01 RX ADMIN — ERGOCALCIFEROL 4000 UNIT(S): 1.25 CAPSULE ORAL at 15:14

## 2023-01-01 RX ADMIN — BUMETANIDE 2 MILLIGRAM(S): 0.25 INJECTION INTRAMUSCULAR; INTRAVENOUS at 11:13

## 2023-01-01 RX ADMIN — Medication 81 MILLIGRAM(S): at 12:15

## 2023-01-01 RX ADMIN — PANTOPRAZOLE SODIUM 40 MILLIGRAM(S): 20 TABLET, DELAYED RELEASE ORAL at 18:43

## 2023-01-01 RX ADMIN — SENNA PLUS 2 TABLET(S): 8.6 TABLET ORAL at 22:02

## 2023-01-01 RX ADMIN — SCOPALAMINE 1 PATCH: 1 PATCH, EXTENDED RELEASE TRANSDERMAL at 07:50

## 2023-01-01 RX ADMIN — Medication 81 MILLIGRAM(S): at 13:23

## 2023-01-01 RX ADMIN — CHLORHEXIDINE GLUCONATE 15 MILLILITER(S): 213 SOLUTION TOPICAL at 18:22

## 2023-01-01 RX ADMIN — LEVETIRACETAM 500 MILLIGRAM(S): 250 TABLET, FILM COATED ORAL at 05:54

## 2023-01-01 RX ADMIN — BUMETANIDE 2 MILLIGRAM(S): 0.25 INJECTION INTRAMUSCULAR; INTRAVENOUS at 05:30

## 2023-01-01 RX ADMIN — MIDODRINE HYDROCHLORIDE 10 MILLIGRAM(S): 2.5 TABLET ORAL at 11:41

## 2023-01-01 RX ADMIN — Medication 81 MILLIGRAM(S): at 12:33

## 2023-01-01 RX ADMIN — LEVETIRACETAM 500 MILLIGRAM(S): 250 TABLET, FILM COATED ORAL at 06:09

## 2023-01-01 RX ADMIN — AMIODARONE HYDROCHLORIDE 200 MILLIGRAM(S): 400 TABLET ORAL at 05:47

## 2023-01-01 RX ADMIN — Medication 1 APPLICATION(S): at 17:40

## 2023-01-01 RX ADMIN — BUMETANIDE 2 MILLIGRAM(S): 0.25 INJECTION INTRAMUSCULAR; INTRAVENOUS at 05:39

## 2023-01-01 RX ADMIN — CHLORHEXIDINE GLUCONATE 15 MILLILITER(S): 213 SOLUTION TOPICAL at 05:01

## 2023-01-01 RX ADMIN — Medication 1 APPLICATION(S): at 17:20

## 2023-01-01 RX ADMIN — ATORVASTATIN CALCIUM 80 MILLIGRAM(S): 80 TABLET, FILM COATED ORAL at 22:06

## 2023-01-01 RX ADMIN — Medication 3: at 17:25

## 2023-01-01 RX ADMIN — Medication 81 MILLIGRAM(S): at 12:52

## 2023-01-01 RX ADMIN — Medication 1: at 11:18

## 2023-01-01 RX ADMIN — Medication 5 MILLIGRAM(S): at 14:44

## 2023-01-01 RX ADMIN — Medication 650 MILLIGRAM(S): at 15:21

## 2023-01-01 RX ADMIN — PANTOPRAZOLE SODIUM 40 MILLIGRAM(S): 20 TABLET, DELAYED RELEASE ORAL at 05:52

## 2023-01-01 RX ADMIN — Medication 55 MILLIGRAM(S): at 05:37

## 2023-01-01 RX ADMIN — Medication 1 APPLICATION(S): at 17:18

## 2023-01-01 RX ADMIN — Medication 4 MILLILITER(S): at 15:50

## 2023-01-01 RX ADMIN — Medication 81 MILLIGRAM(S): at 12:06

## 2023-01-01 RX ADMIN — LEVETIRACETAM 500 MILLIGRAM(S): 250 TABLET, FILM COATED ORAL at 18:12

## 2023-01-01 RX ADMIN — Medication 81 MILLIGRAM(S): at 12:40

## 2023-01-01 RX ADMIN — LEVETIRACETAM 500 MILLIGRAM(S): 250 TABLET, FILM COATED ORAL at 05:35

## 2023-01-01 RX ADMIN — LEVETIRACETAM 500 MILLIGRAM(S): 250 TABLET, FILM COATED ORAL at 05:12

## 2023-01-01 RX ADMIN — PANTOPRAZOLE SODIUM 40 MILLIGRAM(S): 20 TABLET, DELAYED RELEASE ORAL at 19:04

## 2023-01-01 RX ADMIN — SCOPALAMINE 1 PATCH: 1 PATCH, EXTENDED RELEASE TRANSDERMAL at 21:30

## 2023-01-01 RX ADMIN — PANTOPRAZOLE SODIUM 40 MILLIGRAM(S): 20 TABLET, DELAYED RELEASE ORAL at 05:27

## 2023-01-01 RX ADMIN — Medication 500 MILLIGRAM(S): at 11:03

## 2023-01-01 RX ADMIN — PANTOPRAZOLE SODIUM 40 MILLIGRAM(S): 20 TABLET, DELAYED RELEASE ORAL at 05:38

## 2023-01-01 RX ADMIN — HEPARIN SODIUM 5000 UNIT(S): 5000 INJECTION INTRAVENOUS; SUBCUTANEOUS at 06:02

## 2023-01-01 RX ADMIN — Medication 4 MILLILITER(S): at 09:11

## 2023-01-01 RX ADMIN — MIDODRINE HYDROCHLORIDE 10 MILLIGRAM(S): 2.5 TABLET ORAL at 05:12

## 2023-01-01 RX ADMIN — ATORVASTATIN CALCIUM 80 MILLIGRAM(S): 80 TABLET, FILM COATED ORAL at 21:36

## 2023-01-01 RX ADMIN — HEPARIN SODIUM 5000 UNIT(S): 5000 INJECTION INTRAVENOUS; SUBCUTANEOUS at 05:12

## 2023-01-01 RX ADMIN — MIDODRINE HYDROCHLORIDE 10 MILLIGRAM(S): 2.5 TABLET ORAL at 14:41

## 2023-01-01 RX ADMIN — CHLORHEXIDINE GLUCONATE 15 MILLILITER(S): 213 SOLUTION TOPICAL at 05:26

## 2023-01-01 RX ADMIN — Medication 1 APPLICATION(S): at 18:42

## 2023-01-01 RX ADMIN — ATORVASTATIN CALCIUM 80 MILLIGRAM(S): 80 TABLET, FILM COATED ORAL at 22:15

## 2023-01-01 RX ADMIN — ZINC SULFATE TAB 220 MG (50 MG ZINC EQUIVALENT) 220 MILLIGRAM(S): 220 (50 ZN) TAB at 12:32

## 2023-01-01 RX ADMIN — HEPARIN SODIUM 5000 UNIT(S): 5000 INJECTION INTRAVENOUS; SUBCUTANEOUS at 18:31

## 2023-01-01 RX ADMIN — SCOPALAMINE 1 PATCH: 1 PATCH, EXTENDED RELEASE TRANSDERMAL at 07:41

## 2023-01-01 RX ADMIN — LACTULOSE 20 GRAM(S): 10 SOLUTION ORAL at 16:07

## 2023-01-01 RX ADMIN — Medication 4 MILLILITER(S): at 14:42

## 2023-01-01 RX ADMIN — INSULIN GLARGINE 10 UNIT(S): 100 INJECTION, SOLUTION SUBCUTANEOUS at 21:29

## 2023-01-01 RX ADMIN — LEVETIRACETAM 500 MILLIGRAM(S): 250 TABLET, FILM COATED ORAL at 17:52

## 2023-01-01 RX ADMIN — Medication 25 MICROGRAM(S): at 11:30

## 2023-01-01 RX ADMIN — SCOPALAMINE 1 PATCH: 1 PATCH, EXTENDED RELEASE TRANSDERMAL at 01:24

## 2023-01-01 RX ADMIN — Medication 80 MILLIGRAM(S): at 02:31

## 2023-01-01 RX ADMIN — CHLORHEXIDINE GLUCONATE 1 APPLICATION(S): 213 SOLUTION TOPICAL at 05:47

## 2023-01-01 RX ADMIN — HYDROMORPHONE HYDROCHLORIDE 0.5 MILLIGRAM(S): 2 INJECTION INTRAMUSCULAR; INTRAVENOUS; SUBCUTANEOUS at 21:11

## 2023-01-01 RX ADMIN — Medication 1 APPLICATION(S): at 17:27

## 2023-01-01 RX ADMIN — Medication 4 MILLILITER(S): at 09:49

## 2023-01-01 RX ADMIN — AMIODARONE HYDROCHLORIDE 200 MILLIGRAM(S): 400 TABLET ORAL at 05:27

## 2023-01-01 RX ADMIN — Medication 1 APPLICATION(S): at 18:27

## 2023-01-01 RX ADMIN — LEVETIRACETAM 500 MILLIGRAM(S): 250 TABLET, FILM COATED ORAL at 17:45

## 2023-01-01 RX ADMIN — FLUCONAZOLE 200 MILLIGRAM(S): 150 TABLET ORAL at 21:55

## 2023-01-01 RX ADMIN — AMIODARONE HYDROCHLORIDE 200 MILLIGRAM(S): 400 TABLET ORAL at 05:03

## 2023-01-01 RX ADMIN — HEPARIN SODIUM 5000 UNIT(S): 5000 INJECTION INTRAVENOUS; SUBCUTANEOUS at 05:01

## 2023-01-01 RX ADMIN — POLYETHYLENE GLYCOL 3350 17 GRAM(S): 17 POWDER, FOR SOLUTION ORAL at 11:15

## 2023-01-01 RX ADMIN — Medication 50 MILLILITER(S): at 23:30

## 2023-01-01 RX ADMIN — Medication 4 MILLIGRAM(S): at 21:20

## 2023-01-01 RX ADMIN — Medication 1300 MILLIGRAM(S): at 21:25

## 2023-01-01 RX ADMIN — HEPARIN SODIUM 5000 UNIT(S): 5000 INJECTION INTRAVENOUS; SUBCUTANEOUS at 05:34

## 2023-01-01 RX ADMIN — MIDODRINE HYDROCHLORIDE 10 MILLIGRAM(S): 2.5 TABLET ORAL at 17:03

## 2023-01-01 RX ADMIN — Medication 1 APPLICATION(S): at 18:19

## 2023-01-01 RX ADMIN — LEVETIRACETAM 500 MILLIGRAM(S): 250 TABLET, FILM COATED ORAL at 05:58

## 2023-01-01 RX ADMIN — Medication 4 MILLILITER(S): at 14:12

## 2023-01-01 RX ADMIN — CHLORHEXIDINE GLUCONATE 1 APPLICATION(S): 213 SOLUTION TOPICAL at 05:49

## 2023-01-01 RX ADMIN — ATORVASTATIN CALCIUM 80 MILLIGRAM(S): 80 TABLET, FILM COATED ORAL at 21:28

## 2023-01-01 RX ADMIN — LEVETIRACETAM 500 MILLIGRAM(S): 250 TABLET, FILM COATED ORAL at 05:36

## 2023-01-01 RX ADMIN — Medication 4 MILLIGRAM(S): at 21:09

## 2023-01-01 RX ADMIN — CHLORHEXIDINE GLUCONATE 15 MILLILITER(S): 213 SOLUTION TOPICAL at 18:25

## 2023-01-01 RX ADMIN — CHLORHEXIDINE GLUCONATE 1 APPLICATION(S): 213 SOLUTION TOPICAL at 06:20

## 2023-01-01 RX ADMIN — SCOPALAMINE 1 PATCH: 1 PATCH, EXTENDED RELEASE TRANSDERMAL at 23:32

## 2023-01-01 RX ADMIN — Medication 1 APPLICATION(S): at 05:54

## 2023-01-01 RX ADMIN — LEVETIRACETAM 500 MILLIGRAM(S): 250 TABLET, FILM COATED ORAL at 17:09

## 2023-01-01 RX ADMIN — MIDODRINE HYDROCHLORIDE 10 MILLIGRAM(S): 2.5 TABLET ORAL at 11:40

## 2023-01-01 RX ADMIN — HEPARIN SODIUM 5000 UNIT(S): 5000 INJECTION INTRAVENOUS; SUBCUTANEOUS at 05:52

## 2023-01-01 RX ADMIN — Medication 50 MILLIGRAM(S): at 05:26

## 2023-01-01 RX ADMIN — Medication 1 PACKET(S): at 13:22

## 2023-01-01 RX ADMIN — LEVETIRACETAM 500 MILLIGRAM(S): 250 TABLET, FILM COATED ORAL at 05:31

## 2023-01-01 RX ADMIN — SCOPALAMINE 1 PATCH: 1 PATCH, EXTENDED RELEASE TRANSDERMAL at 04:06

## 2023-01-01 RX ADMIN — Medication 81 MILLIGRAM(S): at 13:25

## 2023-01-01 RX ADMIN — SENNA PLUS 2 TABLET(S): 8.6 TABLET ORAL at 21:50

## 2023-01-01 RX ADMIN — PANTOPRAZOLE SODIUM 40 MILLIGRAM(S): 20 TABLET, DELAYED RELEASE ORAL at 17:29

## 2023-01-01 RX ADMIN — LEVETIRACETAM 500 MILLIGRAM(S): 250 TABLET, FILM COATED ORAL at 18:31

## 2023-01-01 RX ADMIN — SENNA PLUS 2 TABLET(S): 8.6 TABLET ORAL at 21:19

## 2023-01-01 RX ADMIN — CEFTOLOZANE AND TAZOBACTAM 100 MILLIGRAM(S): 1; .5 INJECTION, POWDER, LYOPHILIZED, FOR SOLUTION INTRAVENOUS at 15:15

## 2023-01-01 RX ADMIN — Medication 2: at 06:33

## 2023-01-01 RX ADMIN — CEFTOLOZANE AND TAZOBACTAM 100 MILLIGRAM(S): 1; .5 INJECTION, POWDER, LYOPHILIZED, FOR SOLUTION INTRAVENOUS at 21:01

## 2023-01-01 RX ADMIN — LEVETIRACETAM 500 MILLIGRAM(S): 250 TABLET, FILM COATED ORAL at 17:47

## 2023-01-01 RX ADMIN — Medication 1 TABLET(S): at 13:25

## 2023-01-01 RX ADMIN — Medication 1300 MILLIGRAM(S): at 23:33

## 2023-01-01 RX ADMIN — Medication 4 MILLILITER(S): at 20:29

## 2023-01-01 RX ADMIN — BUMETANIDE 2 MILLIGRAM(S): 0.25 INJECTION INTRAMUSCULAR; INTRAVENOUS at 17:47

## 2023-01-01 RX ADMIN — Medication 2: at 06:06

## 2023-01-01 RX ADMIN — CHLORHEXIDINE GLUCONATE 15 MILLILITER(S): 213 SOLUTION TOPICAL at 18:38

## 2023-01-01 RX ADMIN — Medication 1300 MILLIGRAM(S): at 06:18

## 2023-01-01 RX ADMIN — Medication 1 APPLICATION(S): at 06:48

## 2023-01-01 RX ADMIN — Medication 81 MILLIGRAM(S): at 11:35

## 2023-01-01 RX ADMIN — CHLORHEXIDINE GLUCONATE 15 MILLILITER(S): 213 SOLUTION TOPICAL at 17:40

## 2023-01-01 RX ADMIN — HYDROMORPHONE HYDROCHLORIDE 0.5 MILLIGRAM(S): 2 INJECTION INTRAMUSCULAR; INTRAVENOUS; SUBCUTANEOUS at 20:18

## 2023-01-01 RX ADMIN — CHLORHEXIDINE GLUCONATE 1 APPLICATION(S): 213 SOLUTION TOPICAL at 05:11

## 2023-01-01 RX ADMIN — OCTREOTIDE ACETATE 50 MICROGRAM(S): 200 INJECTION, SOLUTION INTRAVENOUS; SUBCUTANEOUS at 04:33

## 2023-01-01 RX ADMIN — Medication 3 MILLILITER(S): at 02:09

## 2023-01-01 RX ADMIN — BUMETANIDE 2 MILLIGRAM(S): 0.25 INJECTION INTRAMUSCULAR; INTRAVENOUS at 06:02

## 2023-01-01 RX ADMIN — CHLORHEXIDINE GLUCONATE 15 MILLILITER(S): 213 SOLUTION TOPICAL at 17:20

## 2023-01-01 RX ADMIN — Medication 3 MILLILITER(S): at 20:58

## 2023-01-01 RX ADMIN — HEPARIN SODIUM 5000 UNIT(S): 5000 INJECTION INTRAVENOUS; SUBCUTANEOUS at 18:03

## 2023-01-01 RX ADMIN — HEPARIN SODIUM 5000 UNIT(S): 5000 INJECTION INTRAVENOUS; SUBCUTANEOUS at 05:06

## 2023-01-01 RX ADMIN — Medication 1 APPLICATION(S): at 05:36

## 2023-01-01 RX ADMIN — SODIUM ZIRCONIUM CYCLOSILICATE 10 GRAM(S): 10 POWDER, FOR SUSPENSION ORAL at 20:23

## 2023-01-01 RX ADMIN — Medication 1 APPLICATION(S): at 05:09

## 2023-01-01 RX ADMIN — Medication 1 APPLICATION(S): at 20:22

## 2023-01-01 RX ADMIN — MIDODRINE HYDROCHLORIDE 10 MILLIGRAM(S): 2.5 TABLET ORAL at 05:05

## 2023-01-01 RX ADMIN — INSULIN GLARGINE 10 UNIT(S): 100 INJECTION, SOLUTION SUBCUTANEOUS at 21:53

## 2023-01-01 RX ADMIN — PANTOPRAZOLE SODIUM 40 MILLIGRAM(S): 20 TABLET, DELAYED RELEASE ORAL at 05:05

## 2023-01-01 RX ADMIN — Medication 1: at 17:45

## 2023-01-01 RX ADMIN — BUMETANIDE 2 MILLIGRAM(S): 0.25 INJECTION INTRAMUSCULAR; INTRAVENOUS at 09:54

## 2023-01-01 RX ADMIN — Medication 1 APPLICATION(S): at 05:39

## 2023-01-01 RX ADMIN — Medication 1: at 13:02

## 2023-01-01 RX ADMIN — Medication 120 MILLIGRAM(S): at 05:06

## 2023-01-01 RX ADMIN — FLUCONAZOLE 200 MILLIGRAM(S): 150 TABLET ORAL at 22:41

## 2023-01-01 RX ADMIN — Medication 4 MILLILITER(S): at 21:25

## 2023-01-01 RX ADMIN — Medication 1: at 12:11

## 2023-01-01 RX ADMIN — CHLORHEXIDINE GLUCONATE 15 MILLILITER(S): 213 SOLUTION TOPICAL at 06:03

## 2023-01-01 RX ADMIN — Medication 1 APPLICATION(S): at 05:11

## 2023-01-01 RX ADMIN — ZINC SULFATE TAB 220 MG (50 MG ZINC EQUIVALENT) 220 MILLIGRAM(S): 220 (50 ZN) TAB at 13:26

## 2023-01-01 RX ADMIN — CHLORHEXIDINE GLUCONATE 15 MILLILITER(S): 213 SOLUTION TOPICAL at 17:25

## 2023-01-01 RX ADMIN — BUMETANIDE 2 MILLIGRAM(S): 0.25 INJECTION INTRAMUSCULAR; INTRAVENOUS at 05:37

## 2023-01-01 RX ADMIN — INSULIN GLARGINE 10 UNIT(S): 100 INJECTION, SOLUTION SUBCUTANEOUS at 21:17

## 2023-01-01 RX ADMIN — Medication 81 MILLIGRAM(S): at 11:47

## 2023-01-01 RX ADMIN — ATORVASTATIN CALCIUM 80 MILLIGRAM(S): 80 TABLET, FILM COATED ORAL at 21:58

## 2023-01-01 RX ADMIN — CHLORHEXIDINE GLUCONATE 1 APPLICATION(S): 213 SOLUTION TOPICAL at 05:38

## 2023-01-01 RX ADMIN — Medication 1300 MILLIGRAM(S): at 05:52

## 2023-01-01 RX ADMIN — INSULIN GLARGINE 10 UNIT(S): 100 INJECTION, SOLUTION SUBCUTANEOUS at 22:09

## 2023-01-01 RX ADMIN — Medication 1300 MILLIGRAM(S): at 17:06

## 2023-01-01 RX ADMIN — AMIODARONE HYDROCHLORIDE 200 MILLIGRAM(S): 400 TABLET ORAL at 05:39

## 2023-01-01 RX ADMIN — Medication 1300 MILLIGRAM(S): at 21:06

## 2023-01-01 RX ADMIN — LEVETIRACETAM 500 MILLIGRAM(S): 250 TABLET, FILM COATED ORAL at 17:16

## 2023-01-01 RX ADMIN — Medication 300 MILLIGRAM(S): at 14:26

## 2023-01-01 RX ADMIN — SENNA PLUS 2 TABLET(S): 8.6 TABLET ORAL at 21:20

## 2023-01-01 RX ADMIN — Medication 3 MILLILITER(S): at 20:12

## 2023-01-01 RX ADMIN — BUMETANIDE 2 MILLIGRAM(S): 0.25 INJECTION INTRAMUSCULAR; INTRAVENOUS at 08:13

## 2023-01-01 RX ADMIN — INSULIN GLARGINE 10 UNIT(S): 100 INJECTION, SOLUTION SUBCUTANEOUS at 22:24

## 2023-01-01 RX ADMIN — BUMETANIDE 2 MILLIGRAM(S): 0.25 INJECTION INTRAMUSCULAR; INTRAVENOUS at 22:48

## 2023-01-01 RX ADMIN — BUMETANIDE 2 MILLIGRAM(S): 0.25 INJECTION INTRAMUSCULAR; INTRAVENOUS at 17:56

## 2023-01-01 RX ADMIN — LACOSAMIDE 100 MILLIGRAM(S): 50 TABLET ORAL at 18:57

## 2023-01-01 RX ADMIN — MIDODRINE HYDROCHLORIDE 10 MILLIGRAM(S): 2.5 TABLET ORAL at 21:09

## 2023-01-01 RX ADMIN — BUMETANIDE 4 MILLIGRAM(S): 0.25 INJECTION INTRAMUSCULAR; INTRAVENOUS at 05:22

## 2023-01-01 RX ADMIN — Medication 120 MILLIGRAM(S): at 05:55

## 2023-01-01 RX ADMIN — CHLORHEXIDINE GLUCONATE 15 MILLILITER(S): 213 SOLUTION TOPICAL at 17:47

## 2023-01-01 RX ADMIN — LACOSAMIDE 140 MILLIGRAM(S): 50 TABLET ORAL at 16:02

## 2023-01-01 RX ADMIN — HEPARIN SODIUM 5000 UNIT(S): 5000 INJECTION INTRAVENOUS; SUBCUTANEOUS at 05:54

## 2023-01-01 RX ADMIN — Medication 1 APPLICATION(S): at 05:03

## 2023-01-01 RX ADMIN — Medication 4 MILLILITER(S): at 09:26

## 2023-01-01 RX ADMIN — Medication 1 APPLICATION(S): at 18:21

## 2023-01-01 RX ADMIN — SCOPALAMINE 1 PATCH: 1 PATCH, EXTENDED RELEASE TRANSDERMAL at 18:20

## 2023-01-01 RX ADMIN — ATORVASTATIN CALCIUM 80 MILLIGRAM(S): 80 TABLET, FILM COATED ORAL at 21:38

## 2023-01-01 RX ADMIN — Medication 1 APPLICATION(S): at 17:33

## 2023-01-01 RX ADMIN — POLYETHYLENE GLYCOL 3350 17 GRAM(S): 17 POWDER, FOR SOLUTION ORAL at 12:27

## 2023-01-01 RX ADMIN — SODIUM ZIRCONIUM CYCLOSILICATE 10 GRAM(S): 10 POWDER, FOR SUSPENSION ORAL at 05:14

## 2023-01-01 RX ADMIN — Medication 300 MILLIGRAM(S): at 11:38

## 2023-01-01 RX ADMIN — HEPARIN SODIUM 5000 UNIT(S): 5000 INJECTION INTRAVENOUS; SUBCUTANEOUS at 17:36

## 2023-01-01 RX ADMIN — CHLORHEXIDINE GLUCONATE 15 MILLILITER(S): 213 SOLUTION TOPICAL at 17:57

## 2023-01-01 RX ADMIN — Medication 120 MILLIGRAM(S): at 05:03

## 2023-01-01 RX ADMIN — Medication 50 MILLIGRAM(S): at 05:37

## 2023-01-01 RX ADMIN — Medication 3 MILLILITER(S): at 21:06

## 2023-01-01 RX ADMIN — Medication 1 APPLICATION(S): at 05:04

## 2023-01-01 RX ADMIN — PANTOPRAZOLE SODIUM 40 MILLIGRAM(S): 20 TABLET, DELAYED RELEASE ORAL at 18:55

## 2023-01-01 RX ADMIN — Medication 3 MILLILITER(S): at 16:09

## 2023-01-01 RX ADMIN — AMIODARONE HYDROCHLORIDE 200 MILLIGRAM(S): 400 TABLET ORAL at 05:38

## 2023-01-01 RX ADMIN — CHLORHEXIDINE GLUCONATE 1 APPLICATION(S): 213 SOLUTION TOPICAL at 05:03

## 2023-01-01 RX ADMIN — BUMETANIDE 2 MILLIGRAM(S): 0.25 INJECTION INTRAMUSCULAR; INTRAVENOUS at 17:08

## 2023-01-01 RX ADMIN — Medication 40 MILLIEQUIVALENT(S): at 18:23

## 2023-01-01 RX ADMIN — PANTOPRAZOLE SODIUM 40 MILLIGRAM(S): 20 TABLET, DELAYED RELEASE ORAL at 06:37

## 2023-01-01 RX ADMIN — PANTOPRAZOLE SODIUM 40 MILLIGRAM(S): 20 TABLET, DELAYED RELEASE ORAL at 06:43

## 2023-01-01 RX ADMIN — Medication 1 APPLICATION(S): at 05:45

## 2023-01-01 RX ADMIN — Medication 30 MILLILITER(S): at 11:31

## 2023-01-01 RX ADMIN — Medication 1 APPLICATION(S): at 20:23

## 2023-01-01 RX ADMIN — Medication 120 MILLIGRAM(S): at 05:15

## 2023-01-01 RX ADMIN — Medication 650 MILLIGRAM(S): at 18:18

## 2023-01-01 RX ADMIN — Medication 50 MILLIGRAM(S): at 22:06

## 2023-01-01 RX ADMIN — SENNA PLUS 2 TABLET(S): 8.6 TABLET ORAL at 22:18

## 2023-01-01 RX ADMIN — Medication 1 TABLET(S): at 11:55

## 2023-01-01 RX ADMIN — PANTOPRAZOLE SODIUM 40 MILLIGRAM(S): 20 TABLET, DELAYED RELEASE ORAL at 18:18

## 2023-01-01 RX ADMIN — Medication 2 MILLIGRAM(S): at 15:14

## 2023-01-01 RX ADMIN — AMIODARONE HYDROCHLORIDE 400 MILLIGRAM(S): 400 TABLET ORAL at 14:11

## 2023-01-01 RX ADMIN — PANTOPRAZOLE SODIUM 40 MILLIGRAM(S): 20 TABLET, DELAYED RELEASE ORAL at 05:37

## 2023-01-01 RX ADMIN — Medication 1 APPLICATION(S): at 17:37

## 2023-01-01 RX ADMIN — Medication 1300 MILLIGRAM(S): at 13:27

## 2023-01-01 RX ADMIN — Medication 1300 MILLIGRAM(S): at 06:03

## 2023-01-01 RX ADMIN — Medication 1 MILLIGRAM(S): at 12:33

## 2023-01-01 RX ADMIN — CHLORHEXIDINE GLUCONATE 15 MILLILITER(S): 213 SOLUTION TOPICAL at 18:18

## 2023-01-01 RX ADMIN — Medication 1 APPLICATION(S): at 19:32

## 2023-01-01 RX ADMIN — LEVETIRACETAM 500 MILLIGRAM(S): 250 TABLET, FILM COATED ORAL at 05:19

## 2023-01-01 RX ADMIN — Medication 3 MILLILITER(S): at 03:06

## 2023-01-01 RX ADMIN — Medication 1: at 12:27

## 2023-01-01 RX ADMIN — LEVETIRACETAM 500 MILLIGRAM(S): 250 TABLET, FILM COATED ORAL at 17:51

## 2023-01-01 RX ADMIN — PANTOPRAZOLE SODIUM 40 MILLIGRAM(S): 20 TABLET, DELAYED RELEASE ORAL at 05:31

## 2023-01-01 RX ADMIN — LEVETIRACETAM 500 MILLIGRAM(S): 250 TABLET, FILM COATED ORAL at 05:13

## 2023-01-01 RX ADMIN — CHLORHEXIDINE GLUCONATE 15 MILLILITER(S): 213 SOLUTION TOPICAL at 05:03

## 2023-01-01 RX ADMIN — Medication 500 MILLIGRAM(S): at 12:33

## 2023-01-01 RX ADMIN — Medication 4 MILLILITER(S): at 14:25

## 2023-01-01 RX ADMIN — POLYETHYLENE GLYCOL 3350 17 GRAM(S): 17 POWDER, FOR SOLUTION ORAL at 17:33

## 2023-01-01 RX ADMIN — ATORVASTATIN CALCIUM 80 MILLIGRAM(S): 80 TABLET, FILM COATED ORAL at 22:02

## 2023-01-01 RX ADMIN — Medication 1 APPLICATION(S): at 06:16

## 2023-01-01 RX ADMIN — AMIODARONE HYDROCHLORIDE 33.3 MG/MIN: 400 TABLET ORAL at 21:45

## 2023-01-01 RX ADMIN — PANTOPRAZOLE SODIUM 40 MILLIGRAM(S): 20 TABLET, DELAYED RELEASE ORAL at 05:04

## 2023-01-01 RX ADMIN — CHLORHEXIDINE GLUCONATE 15 MILLILITER(S): 213 SOLUTION TOPICAL at 19:07

## 2023-01-01 RX ADMIN — CHLORHEXIDINE GLUCONATE 15 MILLILITER(S): 213 SOLUTION TOPICAL at 06:02

## 2023-01-01 RX ADMIN — Medication 5 MILLIGRAM(S): at 22:06

## 2023-01-01 RX ADMIN — PANTOPRAZOLE SODIUM 40 MILLIGRAM(S): 20 TABLET, DELAYED RELEASE ORAL at 18:38

## 2023-01-01 RX ADMIN — Medication 1 APPLICATION(S): at 05:25

## 2023-01-01 RX ADMIN — PANTOPRAZOLE SODIUM 40 MILLIGRAM(S): 20 TABLET, DELAYED RELEASE ORAL at 06:01

## 2023-01-01 RX ADMIN — Medication 500 MILLIGRAM(S): at 13:24

## 2023-01-01 RX ADMIN — LACOSAMIDE 100 MILLIGRAM(S): 50 TABLET ORAL at 05:09

## 2023-01-01 RX ADMIN — MIDODRINE HYDROCHLORIDE 10 MILLIGRAM(S): 2.5 TABLET ORAL at 17:39

## 2023-01-01 RX ADMIN — CHLORHEXIDINE GLUCONATE 1 APPLICATION(S): 213 SOLUTION TOPICAL at 05:58

## 2023-01-01 RX ADMIN — PANTOPRAZOLE SODIUM 40 MILLIGRAM(S): 20 TABLET, DELAYED RELEASE ORAL at 05:06

## 2023-01-01 RX ADMIN — AMIODARONE HYDROCHLORIDE 400 MILLIGRAM(S): 400 TABLET ORAL at 06:03

## 2023-01-01 RX ADMIN — SODIUM ZIRCONIUM CYCLOSILICATE 10 GRAM(S): 10 POWDER, FOR SUSPENSION ORAL at 05:01

## 2023-01-01 RX ADMIN — CHLORHEXIDINE GLUCONATE 15 MILLILITER(S): 213 SOLUTION TOPICAL at 05:34

## 2023-01-01 RX ADMIN — BUMETANIDE 2 MILLIGRAM(S): 0.25 INJECTION INTRAMUSCULAR; INTRAVENOUS at 06:13

## 2023-01-01 RX ADMIN — ATORVASTATIN CALCIUM 80 MILLIGRAM(S): 80 TABLET, FILM COATED ORAL at 21:25

## 2023-01-01 RX ADMIN — Medication 4 MILLILITER(S): at 19:53

## 2023-01-01 RX ADMIN — Medication 1 APPLICATION(S): at 05:48

## 2023-01-01 RX ADMIN — Medication 120 MILLIGRAM(S): at 05:47

## 2023-01-01 RX ADMIN — Medication 1 APPLICATION(S): at 17:45

## 2023-01-01 RX ADMIN — LEVETIRACETAM 500 MILLIGRAM(S): 250 TABLET, FILM COATED ORAL at 05:51

## 2023-01-01 RX ADMIN — LEVETIRACETAM 500 MILLIGRAM(S): 250 TABLET, FILM COATED ORAL at 17:37

## 2023-01-01 RX ADMIN — CEFTOLOZANE AND TAZOBACTAM 100 MILLIGRAM(S): 1; .5 INJECTION, POWDER, LYOPHILIZED, FOR SOLUTION INTRAVENOUS at 22:16

## 2023-01-01 RX ADMIN — CHLORHEXIDINE GLUCONATE 1 APPLICATION(S): 213 SOLUTION TOPICAL at 05:41

## 2023-01-01 RX ADMIN — Medication 300 MILLIGRAM(S): at 12:33

## 2023-01-01 RX ADMIN — CHLORHEXIDINE GLUCONATE 15 MILLILITER(S): 213 SOLUTION TOPICAL at 18:02

## 2023-01-01 RX ADMIN — LEVETIRACETAM 500 MILLIGRAM(S): 250 TABLET, FILM COATED ORAL at 17:29

## 2023-01-01 RX ADMIN — HEPARIN SODIUM 5000 UNIT(S): 5000 INJECTION INTRAVENOUS; SUBCUTANEOUS at 17:40

## 2023-01-01 RX ADMIN — Medication 1 APPLICATION(S): at 06:21

## 2023-01-01 RX ADMIN — PANTOPRAZOLE SODIUM 40 MILLIGRAM(S): 20 TABLET, DELAYED RELEASE ORAL at 05:45

## 2023-01-01 RX ADMIN — CEFEPIME 100 MILLIGRAM(S): 1 INJECTION, POWDER, FOR SOLUTION INTRAMUSCULAR; INTRAVENOUS at 05:25

## 2023-01-01 RX ADMIN — Medication 1: at 18:51

## 2023-01-01 RX ADMIN — Medication 3 MILLILITER(S): at 21:19

## 2023-01-01 RX ADMIN — Medication 8.28 MICROGRAM(S)/KG/MIN: at 00:52

## 2023-01-01 RX ADMIN — CHLORHEXIDINE GLUCONATE 15 MILLILITER(S): 213 SOLUTION TOPICAL at 05:22

## 2023-01-01 RX ADMIN — Medication 500 MILLIGRAM(S): at 11:16

## 2023-01-01 RX ADMIN — ATORVASTATIN CALCIUM 80 MILLIGRAM(S): 80 TABLET, FILM COATED ORAL at 21:04

## 2023-01-01 RX ADMIN — CHLORHEXIDINE GLUCONATE 15 MILLILITER(S): 213 SOLUTION TOPICAL at 05:14

## 2023-01-01 RX ADMIN — Medication 1: at 06:02

## 2023-01-01 RX ADMIN — Medication 1 APPLICATION(S): at 05:50

## 2023-01-01 RX ADMIN — Medication 4 MILLILITER(S): at 09:20

## 2023-01-01 RX ADMIN — INSULIN GLARGINE 10 UNIT(S): 100 INJECTION, SOLUTION SUBCUTANEOUS at 21:50

## 2023-01-01 RX ADMIN — CHLORHEXIDINE GLUCONATE 15 MILLILITER(S): 213 SOLUTION TOPICAL at 05:42

## 2023-01-01 RX ADMIN — BUMETANIDE 2 MILLIGRAM(S): 0.25 INJECTION INTRAMUSCULAR; INTRAVENOUS at 17:25

## 2023-01-01 RX ADMIN — Medication 1 MILLIGRAM(S): at 14:39

## 2023-01-01 RX ADMIN — Medication 1: at 18:52

## 2023-01-01 RX ADMIN — AMIODARONE HYDROCHLORIDE 600 MILLIGRAM(S): 400 TABLET ORAL at 20:29

## 2023-01-01 RX ADMIN — BUMETANIDE 2 MILLIGRAM(S): 0.25 INJECTION INTRAMUSCULAR; INTRAVENOUS at 18:31

## 2023-01-01 RX ADMIN — Medication 120 MILLIGRAM(S): at 05:01

## 2023-01-01 RX ADMIN — MIDODRINE HYDROCHLORIDE 10 MILLIGRAM(S): 2.5 TABLET ORAL at 17:24

## 2023-01-01 RX ADMIN — Medication 2: at 11:39

## 2023-01-01 RX ADMIN — PANTOPRAZOLE SODIUM 40 MILLIGRAM(S): 20 TABLET, DELAYED RELEASE ORAL at 05:51

## 2023-01-01 RX ADMIN — Medication 500 MILLIGRAM(S): at 11:48

## 2023-01-01 RX ADMIN — Medication 2: at 11:25

## 2023-01-01 RX ADMIN — Medication 3 MILLILITER(S): at 02:15

## 2023-01-01 RX ADMIN — Medication 1: at 12:34

## 2023-01-01 RX ADMIN — CHLORHEXIDINE GLUCONATE 15 MILLILITER(S): 213 SOLUTION TOPICAL at 05:58

## 2023-01-01 RX ADMIN — INSULIN GLARGINE 10 UNIT(S): 100 INJECTION, SOLUTION SUBCUTANEOUS at 21:00

## 2023-01-01 RX ADMIN — HEPARIN SODIUM 5000 UNIT(S): 5000 INJECTION INTRAVENOUS; SUBCUTANEOUS at 17:52

## 2023-01-01 RX ADMIN — BUMETANIDE 2 MILLIGRAM(S): 0.25 INJECTION INTRAMUSCULAR; INTRAVENOUS at 05:38

## 2023-01-01 RX ADMIN — HEPARIN SODIUM 5000 UNIT(S): 5000 INJECTION INTRAVENOUS; SUBCUTANEOUS at 17:29

## 2023-01-01 RX ADMIN — HEPARIN SODIUM 5000 UNIT(S): 5000 INJECTION INTRAVENOUS; SUBCUTANEOUS at 05:37

## 2023-01-01 RX ADMIN — MIDODRINE HYDROCHLORIDE 10 MILLIGRAM(S): 2.5 TABLET ORAL at 05:07

## 2023-01-01 RX ADMIN — INSULIN GLARGINE 10 UNIT(S): 100 INJECTION, SOLUTION SUBCUTANEOUS at 22:44

## 2023-01-01 RX ADMIN — HEPARIN SODIUM 5000 UNIT(S): 5000 INJECTION INTRAVENOUS; SUBCUTANEOUS at 17:06

## 2023-01-01 RX ADMIN — CHLORHEXIDINE GLUCONATE 15 MILLILITER(S): 213 SOLUTION TOPICAL at 05:48

## 2023-01-01 RX ADMIN — Medication 81 MILLIGRAM(S): at 12:05

## 2023-01-01 RX ADMIN — MIDODRINE HYDROCHLORIDE 10 MILLIGRAM(S): 2.5 TABLET ORAL at 11:16

## 2023-01-01 RX ADMIN — LEVETIRACETAM 500 MILLIGRAM(S): 250 TABLET, FILM COATED ORAL at 05:38

## 2023-01-01 RX ADMIN — Medication 3 MILLILITER(S): at 20:10

## 2023-01-01 RX ADMIN — Medication 1 APPLICATION(S): at 05:33

## 2023-01-01 RX ADMIN — Medication 81 MILLIGRAM(S): at 11:58

## 2023-01-01 RX ADMIN — Medication 5 MILLIGRAM(S): at 21:49

## 2023-01-01 RX ADMIN — CHLORHEXIDINE GLUCONATE 15 MILLILITER(S): 213 SOLUTION TOPICAL at 17:31

## 2023-01-01 RX ADMIN — POLYETHYLENE GLYCOL 3350 17 GRAM(S): 17 POWDER, FOR SOLUTION ORAL at 11:23

## 2023-01-01 RX ADMIN — Medication 1 MILLIGRAM(S): at 11:54

## 2023-01-01 RX ADMIN — Medication 50 MILLIGRAM(S): at 05:12

## 2023-01-01 RX ADMIN — PANTOPRAZOLE SODIUM 40 MILLIGRAM(S): 20 TABLET, DELAYED RELEASE ORAL at 06:09

## 2023-01-01 RX ADMIN — Medication 2: at 05:17

## 2023-01-01 RX ADMIN — INSULIN GLARGINE 10 UNIT(S): 100 INJECTION, SOLUTION SUBCUTANEOUS at 22:13

## 2023-01-01 RX ADMIN — Medication 1300 MILLIGRAM(S): at 05:05

## 2023-01-01 RX ADMIN — CHLORHEXIDINE GLUCONATE 1 APPLICATION(S): 213 SOLUTION TOPICAL at 05:56

## 2023-01-01 RX ADMIN — PANTOPRAZOLE SODIUM 40 MILLIGRAM(S): 20 TABLET, DELAYED RELEASE ORAL at 17:26

## 2023-01-01 RX ADMIN — PROPOFOL 100 MILLIGRAM(S): 10 INJECTION, EMULSION INTRAVENOUS at 00:40

## 2023-01-01 RX ADMIN — Medication 4 MILLILITER(S): at 14:36

## 2023-01-01 RX ADMIN — LEVETIRACETAM 500 MILLIGRAM(S): 250 TABLET, FILM COATED ORAL at 05:01

## 2023-01-01 RX ADMIN — HYDROMORPHONE HYDROCHLORIDE 0.5 MILLIGRAM(S): 2 INJECTION INTRAMUSCULAR; INTRAVENOUS; SUBCUTANEOUS at 20:52

## 2023-01-01 RX ADMIN — Medication 500 MILLIGRAM(S): at 11:55

## 2023-01-01 RX ADMIN — Medication 81 MILLIGRAM(S): at 13:01

## 2023-01-01 RX ADMIN — Medication 1: at 07:07

## 2023-01-01 RX ADMIN — LEVETIRACETAM 500 MILLIGRAM(S): 250 TABLET, FILM COATED ORAL at 17:59

## 2023-01-01 RX ADMIN — Medication 1 MILLIGRAM(S): at 14:25

## 2023-01-01 RX ADMIN — Medication 4 MILLILITER(S): at 04:45

## 2023-01-01 RX ADMIN — PANTOPRAZOLE SODIUM 40 MILLIGRAM(S): 20 TABLET, DELAYED RELEASE ORAL at 05:41

## 2023-01-01 RX ADMIN — CHLORHEXIDINE GLUCONATE 1 APPLICATION(S): 213 SOLUTION TOPICAL at 05:34

## 2023-01-01 RX ADMIN — INSULIN GLARGINE 10 UNIT(S): 100 INJECTION, SOLUTION SUBCUTANEOUS at 22:03

## 2023-01-01 RX ADMIN — HEPARIN SODIUM 5000 UNIT(S): 5000 INJECTION INTRAVENOUS; SUBCUTANEOUS at 06:43

## 2023-01-01 RX ADMIN — MIDODRINE HYDROCHLORIDE 10 MILLIGRAM(S): 2.5 TABLET ORAL at 17:47

## 2023-01-01 RX ADMIN — CHLORHEXIDINE GLUCONATE 1 APPLICATION(S): 213 SOLUTION TOPICAL at 06:39

## 2023-01-01 RX ADMIN — AMIODARONE HYDROCHLORIDE 400 MILLIGRAM(S): 400 TABLET ORAL at 20:45

## 2023-01-01 RX ADMIN — BUMETANIDE 2 MILLIGRAM(S): 0.25 INJECTION INTRAMUSCULAR; INTRAVENOUS at 19:24

## 2023-01-01 RX ADMIN — LEVETIRACETAM 500 MILLIGRAM(S): 250 TABLET, FILM COATED ORAL at 05:10

## 2023-01-01 RX ADMIN — Medication 0: at 12:40

## 2023-01-01 RX ADMIN — BUMETANIDE 2 MILLIGRAM(S): 0.25 INJECTION INTRAMUSCULAR; INTRAVENOUS at 05:11

## 2023-01-01 RX ADMIN — Medication 50 MILLIGRAM(S): at 13:00

## 2023-01-01 RX ADMIN — LEVETIRACETAM 500 MILLIGRAM(S): 250 TABLET, FILM COATED ORAL at 17:25

## 2023-01-01 RX ADMIN — AMIODARONE HYDROCHLORIDE 200 MILLIGRAM(S): 400 TABLET ORAL at 05:07

## 2023-01-01 RX ADMIN — Medication 1 TABLET(S): at 11:16

## 2023-01-01 RX ADMIN — PANTOPRAZOLE SODIUM 40 MILLIGRAM(S): 20 TABLET, DELAYED RELEASE ORAL at 17:18

## 2023-01-01 RX ADMIN — Medication 81 MILLIGRAM(S): at 11:53

## 2023-01-01 RX ADMIN — Medication 81 MILLIGRAM(S): at 12:14

## 2023-01-01 RX ADMIN — AMIODARONE HYDROCHLORIDE 200 MILLIGRAM(S): 400 TABLET ORAL at 05:40

## 2023-01-01 RX ADMIN — Medication 2 MILLIGRAM(S): at 05:36

## 2023-01-01 RX ADMIN — SCOPALAMINE 1 PATCH: 1 PATCH, EXTENDED RELEASE TRANSDERMAL at 19:38

## 2023-01-01 RX ADMIN — MIDODRINE HYDROCHLORIDE 10 MILLIGRAM(S): 2.5 TABLET ORAL at 17:51

## 2023-01-01 RX ADMIN — CHLORHEXIDINE GLUCONATE 1 APPLICATION(S): 213 SOLUTION TOPICAL at 05:25

## 2023-01-01 RX ADMIN — Medication 4 MILLILITER(S): at 13:41

## 2023-01-01 RX ADMIN — ATORVASTATIN CALCIUM 80 MILLIGRAM(S): 80 TABLET, FILM COATED ORAL at 22:36

## 2023-01-01 RX ADMIN — AMIODARONE HYDROCHLORIDE 200 MILLIGRAM(S): 400 TABLET ORAL at 05:00

## 2023-01-01 RX ADMIN — Medication 81 MILLIGRAM(S): at 12:17

## 2023-01-01 RX ADMIN — PANTOPRAZOLE SODIUM 40 MILLIGRAM(S): 20 TABLET, DELAYED RELEASE ORAL at 20:22

## 2023-01-01 RX ADMIN — SODIUM ZIRCONIUM CYCLOSILICATE 10 GRAM(S): 10 POWDER, FOR SUSPENSION ORAL at 05:45

## 2023-01-01 RX ADMIN — SODIUM CHLORIDE 500 MILLILITER(S): 9 INJECTION, SOLUTION INTRAVENOUS at 20:29

## 2023-01-01 RX ADMIN — FLUCONAZOLE 200 MILLIGRAM(S): 150 TABLET ORAL at 21:36

## 2023-01-01 RX ADMIN — HEPARIN SODIUM 5000 UNIT(S): 5000 INJECTION INTRAVENOUS; SUBCUTANEOUS at 18:39

## 2023-01-01 RX ADMIN — LEVETIRACETAM 500 MILLIGRAM(S): 250 TABLET, FILM COATED ORAL at 18:10

## 2023-01-01 RX ADMIN — CHLORHEXIDINE GLUCONATE 15 MILLILITER(S): 213 SOLUTION TOPICAL at 05:11

## 2023-01-01 RX ADMIN — AMIODARONE HYDROCHLORIDE 200 MILLIGRAM(S): 400 TABLET ORAL at 05:31

## 2023-01-01 RX ADMIN — HEPARIN SODIUM 5000 UNIT(S): 5000 INJECTION INTRAVENOUS; SUBCUTANEOUS at 17:25

## 2023-01-01 RX ADMIN — Medication 4 MILLILITER(S): at 21:19

## 2023-01-01 RX ADMIN — PANTOPRAZOLE SODIUM 40 MILLIGRAM(S): 20 TABLET, DELAYED RELEASE ORAL at 06:16

## 2023-01-01 RX ADMIN — LEVETIRACETAM 500 MILLIGRAM(S): 250 TABLET, FILM COATED ORAL at 05:04

## 2023-01-01 RX ADMIN — Medication 2 MILLIGRAM(S): at 13:30

## 2023-01-01 RX ADMIN — PANTOPRAZOLE SODIUM 40 MILLIGRAM(S): 20 TABLET, DELAYED RELEASE ORAL at 17:38

## 2023-01-01 RX ADMIN — CHLORHEXIDINE GLUCONATE 15 MILLILITER(S): 213 SOLUTION TOPICAL at 18:24

## 2023-01-01 RX ADMIN — Medication 85 MILLIMOLE(S): at 18:21

## 2023-01-01 RX ADMIN — HEPARIN SODIUM 5000 UNIT(S): 5000 INJECTION INTRAVENOUS; SUBCUTANEOUS at 17:39

## 2023-01-01 RX ADMIN — Medication 1 APPLICATION(S): at 17:54

## 2023-01-01 RX ADMIN — CHLORHEXIDINE GLUCONATE 15 MILLILITER(S): 213 SOLUTION TOPICAL at 05:47

## 2023-01-01 RX ADMIN — Medication 8.78 MICROGRAM(S)/KG/MIN: at 11:56

## 2023-01-01 RX ADMIN — HEPARIN SODIUM 5000 UNIT(S): 5000 INJECTION INTRAVENOUS; SUBCUTANEOUS at 17:41

## 2023-01-01 RX ADMIN — LEVETIRACETAM 500 MILLIGRAM(S): 250 TABLET, FILM COATED ORAL at 17:26

## 2023-01-01 RX ADMIN — HEPARIN SODIUM 5000 UNIT(S): 5000 INJECTION INTRAVENOUS; SUBCUTANEOUS at 05:38

## 2023-01-01 RX ADMIN — Medication 81 MILLIGRAM(S): at 11:33

## 2023-01-01 RX ADMIN — Medication 0: at 18:15

## 2023-01-01 RX ADMIN — Medication 1 PACKET(S): at 17:46

## 2023-01-01 RX ADMIN — Medication 81 MILLIGRAM(S): at 15:47

## 2023-01-01 RX ADMIN — INSULIN GLARGINE 10 UNIT(S): 100 INJECTION, SOLUTION SUBCUTANEOUS at 22:46

## 2023-01-01 RX ADMIN — BUMETANIDE 2 MILLIGRAM(S): 0.25 INJECTION INTRAMUSCULAR; INTRAVENOUS at 17:52

## 2023-01-01 RX ADMIN — Medication 50 MILLIGRAM(S): at 21:21

## 2023-01-01 RX ADMIN — SODIUM ZIRCONIUM CYCLOSILICATE 10 GRAM(S): 10 POWDER, FOR SUSPENSION ORAL at 05:56

## 2023-01-01 RX ADMIN — BUMETANIDE 2 MILLIGRAM(S): 0.25 INJECTION INTRAMUSCULAR; INTRAVENOUS at 21:36

## 2023-01-01 RX ADMIN — LEVETIRACETAM 500 MILLIGRAM(S): 250 TABLET, FILM COATED ORAL at 06:02

## 2023-01-01 RX ADMIN — CHLORHEXIDINE GLUCONATE 1 APPLICATION(S): 213 SOLUTION TOPICAL at 05:02

## 2023-01-01 RX ADMIN — LEVETIRACETAM 500 MILLIGRAM(S): 250 TABLET, FILM COATED ORAL at 18:34

## 2023-01-01 RX ADMIN — Medication 1300 MILLIGRAM(S): at 14:05

## 2023-01-01 RX ADMIN — MIDODRINE HYDROCHLORIDE 10 MILLIGRAM(S): 2.5 TABLET ORAL at 06:03

## 2023-01-01 RX ADMIN — HEPARIN SODIUM 5000 UNIT(S): 5000 INJECTION INTRAVENOUS; SUBCUTANEOUS at 18:12

## 2023-01-01 RX ADMIN — POLYETHYLENE GLYCOL 3350 17 GRAM(S): 17 POWDER, FOR SOLUTION ORAL at 12:02

## 2023-01-01 RX ADMIN — AMIODARONE HYDROCHLORIDE 200 MILLIGRAM(S): 400 TABLET ORAL at 06:16

## 2023-01-01 RX ADMIN — HEPARIN SODIUM 5000 UNIT(S): 5000 INJECTION INTRAVENOUS; SUBCUTANEOUS at 05:15

## 2023-01-01 RX ADMIN — AMIODARONE HYDROCHLORIDE 200 MILLIGRAM(S): 400 TABLET ORAL at 05:19

## 2023-01-01 RX ADMIN — HEPARIN SODIUM 5000 UNIT(S): 5000 INJECTION INTRAVENOUS; SUBCUTANEOUS at 05:53

## 2023-01-01 RX ADMIN — MIDODRINE HYDROCHLORIDE 10 MILLIGRAM(S): 2.5 TABLET ORAL at 22:41

## 2023-01-01 RX ADMIN — MIDODRINE HYDROCHLORIDE 10 MILLIGRAM(S): 2.5 TABLET ORAL at 14:08

## 2023-01-01 RX ADMIN — AMIODARONE HYDROCHLORIDE 200 MILLIGRAM(S): 400 TABLET ORAL at 05:12

## 2023-01-01 RX ADMIN — Medication 50 MILLILITER(S): at 04:45

## 2023-01-01 RX ADMIN — PANTOPRAZOLE SODIUM 40 MILLIGRAM(S): 20 TABLET, DELAYED RELEASE ORAL at 18:10

## 2023-01-01 RX ADMIN — CHLORHEXIDINE GLUCONATE 15 MILLILITER(S): 213 SOLUTION TOPICAL at 05:07

## 2023-01-01 RX ADMIN — LEVETIRACETAM 500 MILLIGRAM(S): 250 TABLET, FILM COATED ORAL at 06:01

## 2023-01-01 RX ADMIN — Medication 300 MILLIGRAM(S): at 11:43

## 2023-01-01 RX ADMIN — LEVETIRACETAM 500 MILLIGRAM(S): 250 TABLET, FILM COATED ORAL at 05:24

## 2023-01-01 RX ADMIN — MIDODRINE HYDROCHLORIDE 10 MILLIGRAM(S): 2.5 TABLET ORAL at 21:50

## 2023-01-01 RX ADMIN — Medication 3 MILLILITER(S): at 20:31

## 2023-01-01 RX ADMIN — HEPARIN SODIUM 5000 UNIT(S): 5000 INJECTION INTRAVENOUS; SUBCUTANEOUS at 17:18

## 2023-01-01 RX ADMIN — CEFTOLOZANE AND TAZOBACTAM 100 MILLIGRAM(S): 1; .5 INJECTION, POWDER, LYOPHILIZED, FOR SOLUTION INTRAVENOUS at 05:13

## 2023-01-01 RX ADMIN — LEVETIRACETAM 500 MILLIGRAM(S): 250 TABLET, FILM COATED ORAL at 19:03

## 2023-01-01 RX ADMIN — Medication 1 APPLICATION(S): at 05:56

## 2023-01-01 RX ADMIN — LEVETIRACETAM 500 MILLIGRAM(S): 250 TABLET, FILM COATED ORAL at 17:27

## 2023-01-01 RX ADMIN — BUMETANIDE 2 MILLIGRAM(S): 0.25 INJECTION INTRAMUSCULAR; INTRAVENOUS at 05:51

## 2023-01-01 RX ADMIN — POLYETHYLENE GLYCOL 3350 17 GRAM(S): 17 POWDER, FOR SOLUTION ORAL at 11:31

## 2023-01-01 RX ADMIN — MIDODRINE HYDROCHLORIDE 10 MILLIGRAM(S): 2.5 TABLET ORAL at 12:14

## 2023-01-01 RX ADMIN — SCOPALAMINE 1 PATCH: 1 PATCH, EXTENDED RELEASE TRANSDERMAL at 06:21

## 2023-01-01 RX ADMIN — BUMETANIDE 2 MILLIGRAM(S): 0.25 INJECTION INTRAMUSCULAR; INTRAVENOUS at 18:11

## 2023-01-01 RX ADMIN — CHLORHEXIDINE GLUCONATE 15 MILLILITER(S): 213 SOLUTION TOPICAL at 18:10

## 2023-01-01 RX ADMIN — AMIODARONE HYDROCHLORIDE 200 MILLIGRAM(S): 400 TABLET ORAL at 05:33

## 2023-01-01 RX ADMIN — PANTOPRAZOLE SODIUM 40 MILLIGRAM(S): 20 TABLET, DELAYED RELEASE ORAL at 05:32

## 2023-01-01 RX ADMIN — Medication 500 MILLIGRAM(S): at 11:44

## 2023-01-01 RX ADMIN — PANTOPRAZOLE SODIUM 40 MILLIGRAM(S): 20 TABLET, DELAYED RELEASE ORAL at 17:19

## 2023-01-01 RX ADMIN — Medication 3 MILLILITER(S): at 13:21

## 2023-01-01 RX ADMIN — CHLORHEXIDINE GLUCONATE 1 APPLICATION(S): 213 SOLUTION TOPICAL at 05:05

## 2023-01-01 RX ADMIN — Medication 81 MILLIGRAM(S): at 12:27

## 2023-01-01 RX ADMIN — Medication 650 MILLIGRAM(S): at 21:55

## 2023-01-01 RX ADMIN — AMIODARONE HYDROCHLORIDE 200 MILLIGRAM(S): 400 TABLET ORAL at 05:37

## 2023-01-01 RX ADMIN — Medication 1 TABLET(S): at 11:41

## 2023-01-01 RX ADMIN — Medication 5 MILLIGRAM(S): at 15:21

## 2023-01-01 RX ADMIN — BUMETANIDE 2 MILLIGRAM(S): 0.25 INJECTION INTRAMUSCULAR; INTRAVENOUS at 05:07

## 2023-01-01 RX ADMIN — SODIUM ZIRCONIUM CYCLOSILICATE 5 GRAM(S): 10 POWDER, FOR SUSPENSION ORAL at 12:52

## 2023-01-01 RX ADMIN — Medication 4 MILLILITER(S): at 07:45

## 2023-01-01 RX ADMIN — Medication 4 MILLIGRAM(S): at 22:42

## 2023-01-01 RX ADMIN — POLYETHYLENE GLYCOL 3350 17 GRAM(S): 17 POWDER, FOR SOLUTION ORAL at 05:50

## 2023-01-01 RX ADMIN — AMIODARONE HYDROCHLORIDE 400 MILLIGRAM(S): 400 TABLET ORAL at 06:17

## 2023-01-01 RX ADMIN — Medication 1 APPLICATION(S): at 17:58

## 2023-01-01 RX ADMIN — POLYETHYLENE GLYCOL 3350 17 GRAM(S): 17 POWDER, FOR SOLUTION ORAL at 12:23

## 2023-01-01 RX ADMIN — ZINC SULFATE TAB 220 MG (50 MG ZINC EQUIVALENT) 220 MILLIGRAM(S): 220 (50 ZN) TAB at 11:17

## 2023-01-01 RX ADMIN — Medication 3 MILLILITER(S): at 08:34

## 2023-01-01 RX ADMIN — ATORVASTATIN CALCIUM 80 MILLIGRAM(S): 80 TABLET, FILM COATED ORAL at 21:06

## 2023-01-01 RX ADMIN — CHLORHEXIDINE GLUCONATE 15 MILLILITER(S): 213 SOLUTION TOPICAL at 20:22

## 2023-01-01 RX ADMIN — INSULIN GLARGINE 10 UNIT(S): 100 INJECTION, SOLUTION SUBCUTANEOUS at 21:26

## 2023-01-01 RX ADMIN — AMIODARONE HYDROCHLORIDE 200 MILLIGRAM(S): 400 TABLET ORAL at 05:36

## 2023-01-01 RX ADMIN — Medication 3: at 12:46

## 2023-01-01 RX ADMIN — Medication 1 TABLET(S): at 14:38

## 2023-01-01 RX ADMIN — Medication 3 MILLILITER(S): at 21:00

## 2023-01-01 RX ADMIN — POLYETHYLENE GLYCOL 3350 17 GRAM(S): 17 POWDER, FOR SOLUTION ORAL at 11:55

## 2023-01-01 RX ADMIN — BUMETANIDE 2 MILLIGRAM(S): 0.25 INJECTION INTRAMUSCULAR; INTRAVENOUS at 05:02

## 2023-01-01 RX ADMIN — AMIODARONE HYDROCHLORIDE 200 MILLIGRAM(S): 400 TABLET ORAL at 05:24

## 2023-01-01 RX ADMIN — CHLORHEXIDINE GLUCONATE 1 APPLICATION(S): 213 SOLUTION TOPICAL at 05:13

## 2023-01-01 RX ADMIN — PANTOPRAZOLE SODIUM 40 MILLIGRAM(S): 20 TABLET, DELAYED RELEASE ORAL at 05:02

## 2023-01-01 RX ADMIN — INSULIN GLARGINE 10 UNIT(S): 100 INJECTION, SOLUTION SUBCUTANEOUS at 22:04

## 2023-01-01 RX ADMIN — Medication 1 APPLICATION(S): at 17:43

## 2023-01-01 RX ADMIN — Medication 1: at 17:15

## 2023-01-01 RX ADMIN — LEVETIRACETAM 500 MILLIGRAM(S): 250 TABLET, FILM COATED ORAL at 17:36

## 2023-01-01 RX ADMIN — CEFTOLOZANE AND TAZOBACTAM 100 MILLIGRAM(S): 1; .5 INJECTION, POWDER, LYOPHILIZED, FOR SOLUTION INTRAVENOUS at 14:06

## 2023-01-01 RX ADMIN — LEVETIRACETAM 500 MILLIGRAM(S): 250 TABLET, FILM COATED ORAL at 06:04

## 2023-01-01 RX ADMIN — LEVETIRACETAM 500 MILLIGRAM(S): 250 TABLET, FILM COATED ORAL at 05:40

## 2023-01-01 RX ADMIN — INSULIN GLARGINE 10 UNIT(S): 100 INJECTION, SOLUTION SUBCUTANEOUS at 22:31

## 2023-01-01 RX ADMIN — SODIUM ZIRCONIUM CYCLOSILICATE 10 GRAM(S): 10 POWDER, FOR SUSPENSION ORAL at 06:16

## 2023-01-01 RX ADMIN — ZINC SULFATE TAB 220 MG (50 MG ZINC EQUIVALENT) 220 MILLIGRAM(S): 220 (50 ZN) TAB at 11:15

## 2023-01-01 RX ADMIN — SODIUM ZIRCONIUM CYCLOSILICATE 10 GRAM(S): 10 POWDER, FOR SUSPENSION ORAL at 22:42

## 2023-01-01 RX ADMIN — HEPARIN SODIUM 5000 UNIT(S): 5000 INJECTION INTRAVENOUS; SUBCUTANEOUS at 05:55

## 2023-01-01 RX ADMIN — Medication 4 MILLILITER(S): at 01:52

## 2023-01-01 RX ADMIN — Medication 3 MILLILITER(S): at 19:53

## 2023-01-01 RX ADMIN — CEFTOLOZANE AND TAZOBACTAM 100 MILLIGRAM(S): 1; .5 INJECTION, POWDER, LYOPHILIZED, FOR SOLUTION INTRAVENOUS at 21:26

## 2023-01-01 RX ADMIN — CEFTOLOZANE AND TAZOBACTAM 100 MILLIGRAM(S): 1; .5 INJECTION, POWDER, LYOPHILIZED, FOR SOLUTION INTRAVENOUS at 05:16

## 2023-01-01 RX ADMIN — PANTOPRAZOLE SODIUM 40 MILLIGRAM(S): 20 TABLET, DELAYED RELEASE ORAL at 05:34

## 2023-01-01 RX ADMIN — CHLORHEXIDINE GLUCONATE 15 MILLILITER(S): 213 SOLUTION TOPICAL at 17:26

## 2023-01-01 RX ADMIN — AMIODARONE HYDROCHLORIDE 200 MILLIGRAM(S): 400 TABLET ORAL at 05:34

## 2023-01-01 RX ADMIN — Medication 3 MILLILITER(S): at 02:01

## 2023-01-01 RX ADMIN — Medication 1 APPLICATION(S): at 05:26

## 2023-01-01 RX ADMIN — Medication 120 MILLIGRAM(S): at 05:53

## 2023-01-01 RX ADMIN — MIDODRINE HYDROCHLORIDE 10 MILLIGRAM(S): 2.5 TABLET ORAL at 17:26

## 2023-01-01 RX ADMIN — Medication 4 MILLILITER(S): at 01:56

## 2023-01-01 RX ADMIN — CHLORHEXIDINE GLUCONATE 1 APPLICATION(S): 213 SOLUTION TOPICAL at 05:07

## 2023-01-01 RX ADMIN — Medication 3 MILLILITER(S): at 14:42

## 2023-01-01 RX ADMIN — CHLORHEXIDINE GLUCONATE 15 MILLILITER(S): 213 SOLUTION TOPICAL at 18:53

## 2023-01-01 RX ADMIN — LEVETIRACETAM 500 MILLIGRAM(S): 250 TABLET, FILM COATED ORAL at 18:15

## 2023-01-01 RX ADMIN — SCOPALAMINE 1 PATCH: 1 PATCH, EXTENDED RELEASE TRANSDERMAL at 19:44

## 2023-01-01 RX ADMIN — Medication 650 MILLIGRAM(S): at 06:07

## 2023-01-01 RX ADMIN — Medication 0.5 MILLIGRAM(S): at 21:05

## 2023-01-01 RX ADMIN — BUMETANIDE 2 MILLIGRAM(S): 0.25 INJECTION INTRAMUSCULAR; INTRAVENOUS at 18:58

## 2023-01-01 RX ADMIN — Medication 1: at 18:15

## 2023-01-01 RX ADMIN — CHLORHEXIDINE GLUCONATE 1 APPLICATION(S): 213 SOLUTION TOPICAL at 05:46

## 2023-01-01 RX ADMIN — Medication 4 MILLIGRAM(S): at 22:04

## 2023-01-01 RX ADMIN — Medication 1 PACKET(S): at 21:29

## 2023-01-01 RX ADMIN — AMIODARONE HYDROCHLORIDE 400 MILLIGRAM(S): 400 TABLET ORAL at 21:41

## 2023-01-01 RX ADMIN — SCOPALAMINE 1 PATCH: 1 PATCH, EXTENDED RELEASE TRANSDERMAL at 18:08

## 2023-01-01 RX ADMIN — Medication 1: at 18:19

## 2023-01-01 RX ADMIN — INSULIN GLARGINE 10 UNIT(S): 100 INJECTION, SOLUTION SUBCUTANEOUS at 22:08

## 2023-01-01 RX ADMIN — PANTOPRAZOLE SODIUM 40 MILLIGRAM(S): 20 TABLET, DELAYED RELEASE ORAL at 06:03

## 2023-01-01 RX ADMIN — FLUCONAZOLE 200 MILLIGRAM(S): 150 TABLET ORAL at 22:45

## 2023-01-01 RX ADMIN — Medication 4 MILLIGRAM(S): at 23:00

## 2023-01-01 RX ADMIN — Medication 3 MILLILITER(S): at 14:16

## 2023-01-01 RX ADMIN — AMIODARONE HYDROCHLORIDE 200 MILLIGRAM(S): 400 TABLET ORAL at 06:49

## 2023-01-01 RX ADMIN — LEVETIRACETAM 500 MILLIGRAM(S): 250 TABLET, FILM COATED ORAL at 17:50

## 2023-01-01 RX ADMIN — PANTOPRAZOLE SODIUM 40 MILLIGRAM(S): 20 TABLET, DELAYED RELEASE ORAL at 18:16

## 2023-01-01 RX ADMIN — Medication 50 MILLIGRAM(S): at 22:41

## 2023-01-01 RX ADMIN — AMIODARONE HYDROCHLORIDE 200 MILLIGRAM(S): 400 TABLET ORAL at 06:10

## 2023-01-01 RX ADMIN — ATORVASTATIN CALCIUM 80 MILLIGRAM(S): 80 TABLET, FILM COATED ORAL at 21:42

## 2023-01-01 RX ADMIN — Medication 1 APPLICATION(S): at 17:52

## 2023-01-01 RX ADMIN — Medication 50 MILLILITER(S): at 06:59

## 2023-01-01 RX ADMIN — Medication 1 APPLICATION(S): at 17:26

## 2023-01-01 RX ADMIN — SCOPALAMINE 1 PATCH: 1 PATCH, EXTENDED RELEASE TRANSDERMAL at 07:02

## 2023-01-01 RX ADMIN — AMIODARONE HYDROCHLORIDE 200 MILLIGRAM(S): 400 TABLET ORAL at 06:02

## 2023-01-01 RX ADMIN — Medication 4 MILLILITER(S): at 20:31

## 2023-01-01 RX ADMIN — Medication 1300 MILLIGRAM(S): at 21:30

## 2023-01-01 RX ADMIN — Medication 1300 MILLIGRAM(S): at 05:18

## 2023-01-01 RX ADMIN — BUMETANIDE 2 MILLIGRAM(S): 0.25 INJECTION INTRAMUSCULAR; INTRAVENOUS at 06:01

## 2023-01-01 RX ADMIN — SENNA PLUS 2 TABLET(S): 8.6 TABLET ORAL at 22:10

## 2023-01-01 RX ADMIN — Medication 120 MILLIGRAM(S): at 06:14

## 2023-01-01 RX ADMIN — LEVETIRACETAM 500 MILLIGRAM(S): 250 TABLET, FILM COATED ORAL at 05:48

## 2023-01-01 RX ADMIN — CHLORHEXIDINE GLUCONATE 15 MILLILITER(S): 213 SOLUTION TOPICAL at 05:06

## 2023-01-01 RX ADMIN — CEFTOLOZANE AND TAZOBACTAM 100 MILLIGRAM(S): 1; .5 INJECTION, POWDER, LYOPHILIZED, FOR SOLUTION INTRAVENOUS at 16:22

## 2023-01-01 RX ADMIN — Medication 1: at 08:37

## 2023-01-01 RX ADMIN — Medication 50 MILLIGRAM(S): at 21:08

## 2023-01-01 RX ADMIN — CHLORHEXIDINE GLUCONATE 1 APPLICATION(S): 213 SOLUTION TOPICAL at 06:48

## 2023-01-01 RX ADMIN — Medication 1 APPLICATION(S): at 17:53

## 2023-01-01 RX ADMIN — AMIODARONE HYDROCHLORIDE 200 MILLIGRAM(S): 400 TABLET ORAL at 05:13

## 2023-01-01 RX ADMIN — HEPARIN SODIUM 5000 UNIT(S): 5000 INJECTION INTRAVENOUS; SUBCUTANEOUS at 17:27

## 2023-01-01 RX ADMIN — PANTOPRAZOLE SODIUM 40 MILLIGRAM(S): 20 TABLET, DELAYED RELEASE ORAL at 17:48

## 2023-01-01 RX ADMIN — Medication 3 MILLILITER(S): at 04:45

## 2023-01-01 RX ADMIN — LEVETIRACETAM 500 MILLIGRAM(S): 250 TABLET, FILM COATED ORAL at 05:33

## 2023-01-01 RX ADMIN — Medication 1 APPLICATION(S): at 07:37

## 2023-01-01 RX ADMIN — CHLORHEXIDINE GLUCONATE 1 APPLICATION(S): 213 SOLUTION TOPICAL at 07:11

## 2023-01-01 RX ADMIN — CEFEPIME 100 MILLIGRAM(S): 1 INJECTION, POWDER, FOR SOLUTION INTRAMUSCULAR; INTRAVENOUS at 05:35

## 2023-01-01 RX ADMIN — PANTOPRAZOLE SODIUM 40 MILLIGRAM(S): 20 TABLET, DELAYED RELEASE ORAL at 17:04

## 2023-01-01 RX ADMIN — AMIODARONE HYDROCHLORIDE 400 MILLIGRAM(S): 400 TABLET ORAL at 22:31

## 2023-01-01 RX ADMIN — POLYETHYLENE GLYCOL 3350 17 GRAM(S): 17 POWDER, FOR SOLUTION ORAL at 12:11

## 2023-01-01 RX ADMIN — CHLORHEXIDINE GLUCONATE 15 MILLILITER(S): 213 SOLUTION TOPICAL at 17:34

## 2023-01-01 RX ADMIN — PANTOPRAZOLE SODIUM 40 MILLIGRAM(S): 20 TABLET, DELAYED RELEASE ORAL at 05:54

## 2023-01-01 RX ADMIN — Medication 1 APPLICATION(S): at 05:40

## 2023-01-01 RX ADMIN — AMIODARONE HYDROCHLORIDE 200 MILLIGRAM(S): 400 TABLET ORAL at 05:11

## 2023-01-01 RX ADMIN — CHLORHEXIDINE GLUCONATE 1 APPLICATION(S): 213 SOLUTION TOPICAL at 05:36

## 2023-01-01 RX ADMIN — PANTOPRAZOLE SODIUM 40 MILLIGRAM(S): 20 TABLET, DELAYED RELEASE ORAL at 17:25

## 2023-01-01 RX ADMIN — MIDODRINE HYDROCHLORIDE 10 MILLIGRAM(S): 2.5 TABLET ORAL at 05:24

## 2023-01-01 RX ADMIN — Medication 50 MILLIEQUIVALENT(S): at 15:21

## 2023-01-01 RX ADMIN — ATORVASTATIN CALCIUM 80 MILLIGRAM(S): 80 TABLET, FILM COATED ORAL at 22:25

## 2023-01-01 RX ADMIN — Medication 1 APPLICATION(S): at 05:13

## 2023-01-01 RX ADMIN — INSULIN GLARGINE 10 UNIT(S): 100 INJECTION, SOLUTION SUBCUTANEOUS at 21:34

## 2023-01-01 RX ADMIN — LACOSAMIDE 100 MILLIGRAM(S): 50 TABLET ORAL at 17:53

## 2023-01-01 RX ADMIN — Medication 1 APPLICATION(S): at 18:25

## 2023-01-01 RX ADMIN — Medication 81 MILLIGRAM(S): at 12:32

## 2023-01-01 RX ADMIN — Medication 3 MILLILITER(S): at 14:24

## 2023-01-01 RX ADMIN — SODIUM ZIRCONIUM CYCLOSILICATE 10 GRAM(S): 10 POWDER, FOR SUSPENSION ORAL at 20:49

## 2023-01-01 RX ADMIN — LEVETIRACETAM 500 MILLIGRAM(S): 250 TABLET, FILM COATED ORAL at 05:34

## 2023-01-01 RX ADMIN — HEPARIN SODIUM 5000 UNIT(S): 5000 INJECTION INTRAVENOUS; SUBCUTANEOUS at 05:31

## 2023-01-01 RX ADMIN — Medication 1 PACKET(S): at 14:13

## 2023-01-01 RX ADMIN — PANTOPRAZOLE SODIUM 40 MILLIGRAM(S): 20 TABLET, DELAYED RELEASE ORAL at 13:21

## 2023-01-01 RX ADMIN — Medication 1 APPLICATION(S): at 17:14

## 2023-01-01 RX ADMIN — LEVETIRACETAM 500 MILLIGRAM(S): 250 TABLET, FILM COATED ORAL at 17:46

## 2023-01-01 RX ADMIN — AMIODARONE HYDROCHLORIDE 200 MILLIGRAM(S): 400 TABLET ORAL at 06:15

## 2023-01-01 RX ADMIN — BUMETANIDE 2 MILLIGRAM(S): 0.25 INJECTION INTRAMUSCULAR; INTRAVENOUS at 22:09

## 2023-01-01 RX ADMIN — ATORVASTATIN CALCIUM 80 MILLIGRAM(S): 80 TABLET, FILM COATED ORAL at 22:31

## 2023-01-01 RX ADMIN — Medication 1 TABLET(S): at 11:38

## 2023-01-01 RX ADMIN — ATORVASTATIN CALCIUM 80 MILLIGRAM(S): 80 TABLET, FILM COATED ORAL at 21:55

## 2023-01-01 RX ADMIN — PROPOFOL 100 MILLIGRAM(S): 10 INJECTION, EMULSION INTRAVENOUS at 00:10

## 2023-01-01 RX ADMIN — MIDODRINE HYDROCHLORIDE 10 MILLIGRAM(S): 2.5 TABLET ORAL at 17:06

## 2023-01-01 RX ADMIN — DEXTROSE MONOHYDRATE, SODIUM CHLORIDE, AND POTASSIUM CHLORIDE 150 MILLILITER(S): 50; .745; 4.5 INJECTION, SOLUTION INTRAVENOUS at 03:45

## 2023-01-01 RX ADMIN — BUMETANIDE 1 MILLIGRAM(S): 0.25 INJECTION INTRAMUSCULAR; INTRAVENOUS at 05:02

## 2023-01-01 RX ADMIN — Medication 4 MILLILITER(S): at 16:09

## 2023-01-01 RX ADMIN — BUMETANIDE 2 MILLIGRAM(S): 0.25 INJECTION INTRAMUSCULAR; INTRAVENOUS at 05:01

## 2023-01-01 RX ADMIN — Medication 1: at 17:47

## 2023-01-01 RX ADMIN — HEPARIN SODIUM 5000 UNIT(S): 5000 INJECTION INTRAVENOUS; SUBCUTANEOUS at 06:03

## 2023-01-01 RX ADMIN — LACOSAMIDE 100 MILLIGRAM(S): 50 TABLET ORAL at 05:12

## 2023-01-01 RX ADMIN — Medication 1 TABLET(S): at 11:47

## 2023-01-01 RX ADMIN — MIDODRINE HYDROCHLORIDE 5 MILLIGRAM(S): 2.5 TABLET ORAL at 05:02

## 2023-01-01 RX ADMIN — Medication 1 APPLICATION(S): at 05:47

## 2023-01-01 RX ADMIN — ATORVASTATIN CALCIUM 80 MILLIGRAM(S): 80 TABLET, FILM COATED ORAL at 22:46

## 2023-01-01 RX ADMIN — Medication 1: at 17:16

## 2023-01-01 RX ADMIN — POLYETHYLENE GLYCOL 3350 17 GRAM(S): 17 POWDER, FOR SOLUTION ORAL at 11:44

## 2023-01-01 RX ADMIN — Medication 1 APPLICATION(S): at 05:46

## 2023-01-01 RX ADMIN — HYDROMORPHONE HYDROCHLORIDE 0.5 MILLIGRAM(S): 2 INJECTION INTRAMUSCULAR; INTRAVENOUS; SUBCUTANEOUS at 21:34

## 2023-01-01 RX ADMIN — LEVETIRACETAM 500 MILLIGRAM(S): 250 TABLET, FILM COATED ORAL at 05:37

## 2023-01-01 RX ADMIN — Medication 3 MILLILITER(S): at 08:12

## 2023-01-01 RX ADMIN — Medication 1 APPLICATION(S): at 17:30

## 2023-01-01 RX ADMIN — Medication 1: at 08:33

## 2023-01-01 RX ADMIN — CHLORHEXIDINE GLUCONATE 15 MILLILITER(S): 213 SOLUTION TOPICAL at 05:33

## 2023-01-01 RX ADMIN — Medication 300 MILLIGRAM(S): at 11:44

## 2023-01-01 RX ADMIN — Medication 4 MILLIGRAM(S): at 22:07

## 2023-01-01 RX ADMIN — PANTOPRAZOLE SODIUM 40 MILLIGRAM(S): 20 TABLET, DELAYED RELEASE ORAL at 05:11

## 2023-01-01 RX ADMIN — Medication 1 MILLIGRAM(S): at 11:29

## 2023-01-01 RX ADMIN — PANTOPRAZOLE SODIUM 40 MILLIGRAM(S): 20 TABLET, DELAYED RELEASE ORAL at 05:03

## 2023-01-01 RX ADMIN — ATORVASTATIN CALCIUM 80 MILLIGRAM(S): 80 TABLET, FILM COATED ORAL at 21:27

## 2023-01-01 RX ADMIN — CHLORHEXIDINE GLUCONATE 15 MILLILITER(S): 213 SOLUTION TOPICAL at 05:52

## 2023-01-01 RX ADMIN — AMIODARONE HYDROCHLORIDE 200 MILLIGRAM(S): 400 TABLET ORAL at 05:10

## 2023-01-01 RX ADMIN — Medication 2: at 13:59

## 2023-01-01 RX ADMIN — BUMETANIDE 2 MILLIGRAM(S): 0.25 INJECTION INTRAMUSCULAR; INTRAVENOUS at 05:14

## 2023-01-01 RX ADMIN — CHLORHEXIDINE GLUCONATE 15 MILLILITER(S): 213 SOLUTION TOPICAL at 18:30

## 2023-01-01 RX ADMIN — FLUCONAZOLE 100 MILLIGRAM(S): 150 TABLET ORAL at 17:47

## 2023-01-01 RX ADMIN — ATORVASTATIN CALCIUM 80 MILLIGRAM(S): 80 TABLET, FILM COATED ORAL at 21:01

## 2023-01-01 RX ADMIN — Medication 50 MILLILITER(S): at 02:28

## 2023-01-01 RX ADMIN — Medication 1: at 18:32

## 2023-01-01 RX ADMIN — Medication 50 MILLIGRAM(S): at 10:06

## 2023-01-01 RX ADMIN — Medication 50 MILLIGRAM(S): at 15:17

## 2023-01-01 RX ADMIN — HYDROMORPHONE HYDROCHLORIDE 0.5 MILLIGRAM(S): 2 INJECTION INTRAMUSCULAR; INTRAVENOUS; SUBCUTANEOUS at 21:32

## 2023-01-01 RX ADMIN — Medication 1 APPLICATION(S): at 17:47

## 2023-01-01 RX ADMIN — INSULIN GLARGINE 10 UNIT(S): 100 INJECTION, SOLUTION SUBCUTANEOUS at 21:43

## 2023-01-01 RX ADMIN — PANTOPRAZOLE SODIUM 40 MILLIGRAM(S): 20 TABLET, DELAYED RELEASE ORAL at 06:18

## 2023-01-01 RX ADMIN — PANTOPRAZOLE SODIUM 40 MILLIGRAM(S): 20 TABLET, DELAYED RELEASE ORAL at 17:46

## 2023-01-01 RX ADMIN — BUMETANIDE 2 MILLIGRAM(S): 0.25 INJECTION INTRAMUSCULAR; INTRAVENOUS at 18:21

## 2023-01-01 RX ADMIN — CEFEPIME 100 MILLIGRAM(S): 1 INJECTION, POWDER, FOR SOLUTION INTRAMUSCULAR; INTRAVENOUS at 17:25

## 2023-01-01 RX ADMIN — CHLORHEXIDINE GLUCONATE 1 APPLICATION(S): 213 SOLUTION TOPICAL at 06:00

## 2023-01-01 RX ADMIN — CHLORHEXIDINE GLUCONATE 1 APPLICATION(S): 213 SOLUTION TOPICAL at 05:40

## 2023-01-01 RX ADMIN — HEPARIN SODIUM 5000 UNIT(S): 5000 INJECTION INTRAVENOUS; SUBCUTANEOUS at 06:17

## 2023-01-01 RX ADMIN — Medication 50 MILLIGRAM(S): at 13:05

## 2023-01-01 RX ADMIN — CHLORHEXIDINE GLUCONATE 1 APPLICATION(S): 213 SOLUTION TOPICAL at 05:35

## 2023-01-01 RX ADMIN — Medication 50 MILLIGRAM(S): at 22:00

## 2023-01-01 RX ADMIN — FLUCONAZOLE 200 MILLIGRAM(S): 150 TABLET ORAL at 22:31

## 2023-01-01 RX ADMIN — MIDODRINE HYDROCHLORIDE 10 MILLIGRAM(S): 2.5 TABLET ORAL at 21:19

## 2023-01-01 RX ADMIN — CHLORHEXIDINE GLUCONATE 15 MILLILITER(S): 213 SOLUTION TOPICAL at 18:12

## 2023-01-01 RX ADMIN — LACOSAMIDE 100 MILLIGRAM(S): 50 TABLET ORAL at 07:37

## 2023-01-01 RX ADMIN — HEPARIN SODIUM 5000 UNIT(S): 5000 INJECTION INTRAVENOUS; SUBCUTANEOUS at 18:32

## 2023-01-01 RX ADMIN — BUMETANIDE 2 MILLIGRAM(S): 0.25 INJECTION INTRAMUSCULAR; INTRAVENOUS at 17:13

## 2023-01-01 RX ADMIN — LEVETIRACETAM 500 MILLIGRAM(S): 250 TABLET, FILM COATED ORAL at 20:22

## 2023-01-01 RX ADMIN — CHLORHEXIDINE GLUCONATE 15 MILLILITER(S): 213 SOLUTION TOPICAL at 05:38

## 2023-01-01 RX ADMIN — CHLORHEXIDINE GLUCONATE 1 APPLICATION(S): 213 SOLUTION TOPICAL at 05:21

## 2023-01-01 RX ADMIN — Medication 1 TABLET(S): at 11:03

## 2023-01-01 RX ADMIN — ERGOCALCIFEROL 4000 UNIT(S): 1.25 CAPSULE ORAL at 11:57

## 2023-01-01 RX ADMIN — POLYETHYLENE GLYCOL 3350 17 GRAM(S): 17 POWDER, FOR SOLUTION ORAL at 05:38

## 2023-01-01 RX ADMIN — SODIUM ZIRCONIUM CYCLOSILICATE 10 GRAM(S): 10 POWDER, FOR SUSPENSION ORAL at 05:26

## 2023-01-01 RX ADMIN — HEPARIN SODIUM 5000 UNIT(S): 5000 INJECTION INTRAVENOUS; SUBCUTANEOUS at 19:04

## 2023-01-01 RX ADMIN — ATORVASTATIN CALCIUM 80 MILLIGRAM(S): 80 TABLET, FILM COATED ORAL at 21:30

## 2023-01-01 RX ADMIN — Medication 1 APPLICATION(S): at 18:28

## 2023-01-01 RX ADMIN — Medication 3 MILLILITER(S): at 14:27

## 2023-01-01 RX ADMIN — Medication 55 MILLIGRAM(S): at 22:46

## 2023-01-01 RX ADMIN — Medication 40 MILLIEQUIVALENT(S): at 14:38

## 2023-01-01 RX ADMIN — Medication 200 MILLIGRAM(S): at 22:04

## 2023-01-01 RX ADMIN — BUMETANIDE 2 MILLIGRAM(S): 0.25 INJECTION INTRAMUSCULAR; INTRAVENOUS at 22:22

## 2023-01-01 RX ADMIN — Medication 1 PACKET(S): at 22:03

## 2023-01-01 RX ADMIN — CHLORHEXIDINE GLUCONATE 15 MILLILITER(S): 213 SOLUTION TOPICAL at 18:28

## 2023-01-01 RX ADMIN — Medication 3 MILLILITER(S): at 21:25

## 2023-01-01 RX ADMIN — CEFTOLOZANE AND TAZOBACTAM 100 MILLIGRAM(S): 1; .5 INJECTION, POWDER, LYOPHILIZED, FOR SOLUTION INTRAVENOUS at 05:36

## 2023-01-01 RX ADMIN — POLYETHYLENE GLYCOL 3350 17 GRAM(S): 17 POWDER, FOR SOLUTION ORAL at 12:32

## 2023-01-01 RX ADMIN — Medication 50 MILLIGRAM(S): at 22:02

## 2023-01-01 RX ADMIN — LEVETIRACETAM 500 MILLIGRAM(S): 250 TABLET, FILM COATED ORAL at 06:32

## 2023-01-01 RX ADMIN — ATORVASTATIN CALCIUM 80 MILLIGRAM(S): 80 TABLET, FILM COATED ORAL at 22:07

## 2023-01-01 RX ADMIN — Medication 81 MILLIGRAM(S): at 13:18

## 2023-01-01 RX ADMIN — SODIUM ZIRCONIUM CYCLOSILICATE 10 GRAM(S): 10 POWDER, FOR SUSPENSION ORAL at 17:50

## 2023-01-01 RX ADMIN — SCOPALAMINE 1 PATCH: 1 PATCH, EXTENDED RELEASE TRANSDERMAL at 01:33

## 2023-01-01 RX ADMIN — Medication 4 MILLILITER(S): at 02:31

## 2023-01-01 RX ADMIN — AMIODARONE HYDROCHLORIDE 200 MILLIGRAM(S): 400 TABLET ORAL at 05:05

## 2023-01-01 RX ADMIN — LACOSAMIDE 100 MILLIGRAM(S): 50 TABLET ORAL at 05:24

## 2023-01-01 RX ADMIN — Medication 75 MILLILITER(S): at 04:34

## 2023-01-01 RX ADMIN — CEFTOLOZANE AND TAZOBACTAM 100 MILLIGRAM(S): 1; .5 INJECTION, POWDER, LYOPHILIZED, FOR SOLUTION INTRAVENOUS at 21:28

## 2023-01-01 RX ADMIN — Medication 2: at 08:08

## 2023-01-01 RX ADMIN — Medication 3 MILLILITER(S): at 21:12

## 2023-01-01 RX ADMIN — Medication 3 MILLILITER(S): at 14:36

## 2023-01-01 RX ADMIN — FLUCONAZOLE 200 MILLIGRAM(S): 150 TABLET ORAL at 22:02

## 2023-01-01 RX ADMIN — Medication 1: at 17:35

## 2023-01-01 RX ADMIN — POLYETHYLENE GLYCOL 3350 17 GRAM(S): 17 POWDER, FOR SOLUTION ORAL at 14:00

## 2023-01-01 RX ADMIN — FLUCONAZOLE 200 MILLIGRAM(S): 150 TABLET ORAL at 21:51

## 2023-01-01 RX ADMIN — Medication 3 MILLILITER(S): at 13:03

## 2023-01-01 RX ADMIN — SODIUM ZIRCONIUM CYCLOSILICATE 10 GRAM(S): 10 POWDER, FOR SUSPENSION ORAL at 17:28

## 2023-01-01 RX ADMIN — CEFTOLOZANE AND TAZOBACTAM 100 MILLIGRAM(S): 1; .5 INJECTION, POWDER, LYOPHILIZED, FOR SOLUTION INTRAVENOUS at 17:14

## 2023-01-01 RX ADMIN — SODIUM ZIRCONIUM CYCLOSILICATE 10 GRAM(S): 10 POWDER, FOR SUSPENSION ORAL at 17:31

## 2023-01-01 RX ADMIN — BUMETANIDE 2 MILLIGRAM(S): 0.25 INJECTION INTRAMUSCULAR; INTRAVENOUS at 18:24

## 2023-01-01 RX ADMIN — Medication 3 MILLIGRAM(S): at 21:50

## 2023-01-01 RX ADMIN — CEFTOLOZANE AND TAZOBACTAM 100 MILLIGRAM(S): 1; .5 INJECTION, POWDER, LYOPHILIZED, FOR SOLUTION INTRAVENOUS at 21:48

## 2023-01-01 RX ADMIN — CHLORHEXIDINE GLUCONATE 15 MILLILITER(S): 213 SOLUTION TOPICAL at 18:15

## 2023-01-01 RX ADMIN — Medication 81 MILLIGRAM(S): at 14:20

## 2023-01-01 RX ADMIN — Medication 1300 MILLIGRAM(S): at 05:39

## 2023-01-01 RX ADMIN — Medication 81 MILLIGRAM(S): at 11:18

## 2023-01-01 RX ADMIN — Medication 1 MILLIGRAM(S): at 11:47

## 2023-01-01 RX ADMIN — PANTOPRAZOLE SODIUM 40 MILLIGRAM(S): 20 TABLET, DELAYED RELEASE ORAL at 17:42

## 2023-01-01 RX ADMIN — CHLORHEXIDINE GLUCONATE 15 MILLILITER(S): 213 SOLUTION TOPICAL at 17:37

## 2023-01-01 RX ADMIN — LEVETIRACETAM 400 MILLIGRAM(S): 250 TABLET, FILM COATED ORAL at 23:20

## 2023-01-01 RX ADMIN — CHLORHEXIDINE GLUCONATE 15 MILLILITER(S): 213 SOLUTION TOPICAL at 05:25

## 2023-01-01 RX ADMIN — Medication 50 MILLIGRAM(S): at 05:49

## 2023-01-01 RX ADMIN — Medication 1300 MILLIGRAM(S): at 06:04

## 2023-01-01 RX ADMIN — Medication 81 MILLIGRAM(S): at 11:16

## 2023-01-01 RX ADMIN — MIDODRINE HYDROCHLORIDE 5 MILLIGRAM(S): 2.5 TABLET ORAL at 22:06

## 2023-01-01 RX ADMIN — Medication 4 MILLILITER(S): at 14:23

## 2023-01-01 RX ADMIN — Medication 1 MILLIGRAM(S): at 11:41

## 2023-01-01 RX ADMIN — FLUCONAZOLE 200 MILLIGRAM(S): 150 TABLET ORAL at 22:20

## 2023-01-01 RX ADMIN — PANTOPRAZOLE SODIUM 40 MILLIGRAM(S): 20 TABLET, DELAYED RELEASE ORAL at 18:02

## 2023-01-01 RX ADMIN — HEPARIN SODIUM 5000 UNIT(S): 5000 INJECTION INTRAVENOUS; SUBCUTANEOUS at 18:16

## 2023-01-01 RX ADMIN — BUMETANIDE 2 MILLIGRAM(S): 0.25 INJECTION INTRAMUSCULAR; INTRAVENOUS at 17:37

## 2023-01-01 RX ADMIN — CHLORHEXIDINE GLUCONATE 15 MILLILITER(S): 213 SOLUTION TOPICAL at 06:13

## 2023-01-01 RX ADMIN — BUMETANIDE 2 MILLIGRAM(S): 0.25 INJECTION INTRAMUSCULAR; INTRAVENOUS at 05:19

## 2023-01-01 RX ADMIN — AMIODARONE HYDROCHLORIDE 200 MILLIGRAM(S): 400 TABLET ORAL at 05:54

## 2023-01-01 RX ADMIN — Medication 5 MILLIGRAM(S): at 05:50

## 2023-01-01 RX ADMIN — AMIODARONE HYDROCHLORIDE 200 MILLIGRAM(S): 400 TABLET ORAL at 06:06

## 2023-01-01 RX ADMIN — CHLORHEXIDINE GLUCONATE 15 MILLILITER(S): 213 SOLUTION TOPICAL at 05:00

## 2023-01-01 RX ADMIN — PANTOPRAZOLE SODIUM 40 MILLIGRAM(S): 20 TABLET, DELAYED RELEASE ORAL at 05:12

## 2023-01-01 RX ADMIN — BUMETANIDE 2 MILLIGRAM(S): 0.25 INJECTION INTRAMUSCULAR; INTRAVENOUS at 05:40

## 2023-01-01 RX ADMIN — Medication 2: at 17:00

## 2023-01-01 RX ADMIN — Medication 1300 MILLIGRAM(S): at 23:12

## 2023-01-01 RX ADMIN — Medication 1 TABLET(S): at 14:24

## 2023-01-01 RX ADMIN — SODIUM ZIRCONIUM CYCLOSILICATE 10 GRAM(S): 10 POWDER, FOR SUSPENSION ORAL at 05:51

## 2023-01-01 RX ADMIN — Medication 3 MILLILITER(S): at 13:42

## 2023-01-01 RX ADMIN — BUMETANIDE 1 MILLIGRAM(S): 0.25 INJECTION INTRAMUSCULAR; INTRAVENOUS at 05:40

## 2023-01-01 RX ADMIN — Medication 1 PACKET(S): at 06:01

## 2023-01-01 RX ADMIN — Medication 4 MILLILITER(S): at 23:13

## 2023-01-01 RX ADMIN — CHLORHEXIDINE GLUCONATE 15 MILLILITER(S): 213 SOLUTION TOPICAL at 17:29

## 2023-01-01 RX ADMIN — Medication 1 MILLIGRAM(S): at 11:35

## 2023-01-01 RX ADMIN — HEPARIN SODIUM 5000 UNIT(S): 5000 INJECTION INTRAVENOUS; SUBCUTANEOUS at 05:25

## 2023-01-01 RX ADMIN — CEFTOLOZANE AND TAZOBACTAM 100 MILLIGRAM(S): 1; .5 INJECTION, POWDER, LYOPHILIZED, FOR SOLUTION INTRAVENOUS at 14:20

## 2023-01-01 RX ADMIN — HEPARIN SODIUM 5000 UNIT(S): 5000 INJECTION INTRAVENOUS; SUBCUTANEOUS at 17:26

## 2023-01-01 RX ADMIN — Medication 55 MILLIGRAM(S): at 15:32

## 2023-01-01 RX ADMIN — Medication 4 MILLILITER(S): at 20:59

## 2023-01-01 RX ADMIN — PANTOPRAZOLE SODIUM 40 MILLIGRAM(S): 20 TABLET, DELAYED RELEASE ORAL at 05:13

## 2023-01-01 RX ADMIN — AMIODARONE HYDROCHLORIDE 200 MILLIGRAM(S): 400 TABLET ORAL at 05:41

## 2023-01-01 RX ADMIN — Medication 120 MILLIGRAM(S): at 05:48

## 2023-01-01 RX ADMIN — PANTOPRAZOLE SODIUM 40 MILLIGRAM(S): 20 TABLET, DELAYED RELEASE ORAL at 05:14

## 2023-01-01 RX ADMIN — PANTOPRAZOLE SODIUM 40 MILLIGRAM(S): 20 TABLET, DELAYED RELEASE ORAL at 06:02

## 2023-01-01 RX ADMIN — BUMETANIDE 2 MILLIGRAM(S): 0.25 INJECTION INTRAMUSCULAR; INTRAVENOUS at 06:10

## 2023-01-01 RX ADMIN — MIDODRINE HYDROCHLORIDE 10 MILLIGRAM(S): 2.5 TABLET ORAL at 13:07

## 2023-01-01 RX ADMIN — SCOPALAMINE 1 PATCH: 1 PATCH, EXTENDED RELEASE TRANSDERMAL at 00:32

## 2023-01-01 RX ADMIN — Medication 1 APPLICATION(S): at 18:31

## 2023-01-01 RX ADMIN — PANTOPRAZOLE SODIUM 40 MILLIGRAM(S): 20 TABLET, DELAYED RELEASE ORAL at 17:08

## 2023-01-01 RX ADMIN — Medication 1 MILLIGRAM(S): at 15:53

## 2023-01-01 RX ADMIN — Medication 3 MILLILITER(S): at 01:56

## 2023-01-01 RX ADMIN — MIDODRINE HYDROCHLORIDE 5 MILLIGRAM(S): 2.5 TABLET ORAL at 15:33

## 2023-01-01 RX ADMIN — SODIUM ZIRCONIUM CYCLOSILICATE 5 GRAM(S): 10 POWDER, FOR SUSPENSION ORAL at 12:27

## 2023-01-01 RX ADMIN — CHLORHEXIDINE GLUCONATE 1 APPLICATION(S): 213 SOLUTION TOPICAL at 05:12

## 2023-01-01 RX ADMIN — ATORVASTATIN CALCIUM 80 MILLIGRAM(S): 80 TABLET, FILM COATED ORAL at 21:50

## 2023-01-01 RX ADMIN — HEPARIN SODIUM 5000 UNIT(S): 5000 INJECTION INTRAVENOUS; SUBCUTANEOUS at 18:00

## 2023-01-01 RX ADMIN — Medication 1 APPLICATION(S): at 06:09

## 2023-01-01 RX ADMIN — INSULIN GLARGINE 10 UNIT(S): 100 INJECTION, SOLUTION SUBCUTANEOUS at 21:32

## 2023-01-01 RX ADMIN — BUMETANIDE 2 MILLIGRAM(S): 0.25 INJECTION INTRAMUSCULAR; INTRAVENOUS at 08:14

## 2023-01-01 RX ADMIN — ATORVASTATIN CALCIUM 80 MILLIGRAM(S): 80 TABLET, FILM COATED ORAL at 22:12

## 2023-01-01 RX ADMIN — BUMETANIDE 2 MILLIGRAM(S): 0.25 INJECTION INTRAMUSCULAR; INTRAVENOUS at 05:46

## 2023-01-01 RX ADMIN — Medication 1 APPLICATION(S): at 05:06

## 2023-01-01 RX ADMIN — Medication 5 MILLIGRAM(S): at 05:23

## 2023-01-01 RX ADMIN — PANTOPRAZOLE SODIUM 40 MILLIGRAM(S): 20 TABLET, DELAYED RELEASE ORAL at 11:15

## 2023-01-01 RX ADMIN — Medication 1 TABLET(S): at 11:15

## 2023-01-01 RX ADMIN — BUMETANIDE 2 MILLIGRAM(S): 0.25 INJECTION INTRAMUSCULAR; INTRAVENOUS at 10:10

## 2023-01-01 RX ADMIN — MIDODRINE HYDROCHLORIDE 10 MILLIGRAM(S): 2.5 TABLET ORAL at 11:15

## 2023-01-01 RX ADMIN — AMIODARONE HYDROCHLORIDE 200 MILLIGRAM(S): 400 TABLET ORAL at 05:35

## 2023-01-01 RX ADMIN — Medication 4 MILLILITER(S): at 02:34

## 2023-01-01 RX ADMIN — MIDODRINE HYDROCHLORIDE 10 MILLIGRAM(S): 2.5 TABLET ORAL at 12:32

## 2023-01-01 RX ADMIN — Medication 50 MILLIGRAM(S): at 21:49

## 2023-01-01 RX ADMIN — Medication 120 MILLIGRAM(S): at 06:50

## 2023-01-01 RX ADMIN — HEPARIN SODIUM 5000 UNIT(S): 5000 INJECTION INTRAVENOUS; SUBCUTANEOUS at 05:46

## 2023-01-01 RX ADMIN — Medication 1 MILLIGRAM(S): at 13:25

## 2023-01-01 RX ADMIN — INSULIN GLARGINE 10 UNIT(S): 100 INJECTION, SOLUTION SUBCUTANEOUS at 21:30

## 2023-01-01 RX ADMIN — Medication 50 MILLIGRAM(S): at 14:42

## 2023-01-01 RX ADMIN — CHLORHEXIDINE GLUCONATE 15 MILLILITER(S): 213 SOLUTION TOPICAL at 05:53

## 2023-01-01 RX ADMIN — CHLORHEXIDINE GLUCONATE 15 MILLILITER(S): 213 SOLUTION TOPICAL at 17:05

## 2023-01-01 RX ADMIN — MIDODRINE HYDROCHLORIDE 10 MILLIGRAM(S): 2.5 TABLET ORAL at 21:11

## 2023-01-01 RX ADMIN — CHLORHEXIDINE GLUCONATE 15 MILLILITER(S): 213 SOLUTION TOPICAL at 06:00

## 2023-01-01 RX ADMIN — HEPARIN SODIUM 5000 UNIT(S): 5000 INJECTION INTRAVENOUS; SUBCUTANEOUS at 05:03

## 2023-01-01 RX ADMIN — Medication 120 MILLIGRAM(S): at 05:10

## 2023-01-01 RX ADMIN — BUMETANIDE 2 MILLIGRAM(S): 0.25 INJECTION INTRAMUSCULAR; INTRAVENOUS at 05:27

## 2023-01-01 RX ADMIN — Medication 3 MILLILITER(S): at 01:21

## 2023-01-01 RX ADMIN — Medication 3 MILLILITER(S): at 07:43

## 2023-01-01 RX ADMIN — INSULIN GLARGINE 10 UNIT(S): 100 INJECTION, SOLUTION SUBCUTANEOUS at 22:15

## 2023-01-01 RX ADMIN — Medication 500 MILLIGRAM(S): at 11:43

## 2023-01-01 RX ADMIN — ATORVASTATIN CALCIUM 80 MILLIGRAM(S): 80 TABLET, FILM COATED ORAL at 21:22

## 2023-01-01 RX ADMIN — Medication 120 MILLIGRAM(S): at 05:38

## 2023-01-01 RX ADMIN — Medication 1 APPLICATION(S): at 17:46

## 2023-01-01 RX ADMIN — Medication 1 MILLIGRAM(S): at 11:03

## 2023-01-01 RX ADMIN — ATORVASTATIN CALCIUM 80 MILLIGRAM(S): 80 TABLET, FILM COATED ORAL at 22:23

## 2023-01-01 RX ADMIN — Medication 4 MILLILITER(S): at 13:54

## 2023-01-01 RX ADMIN — Medication 50 MILLIGRAM(S): at 05:02

## 2023-01-01 RX ADMIN — Medication 1 APPLICATION(S): at 05:16

## 2023-01-01 RX ADMIN — CHLORHEXIDINE GLUCONATE 15 MILLILITER(S): 213 SOLUTION TOPICAL at 05:10

## 2023-01-01 RX ADMIN — Medication 3 MILLILITER(S): at 02:32

## 2023-01-01 RX ADMIN — Medication 0: at 21:19

## 2023-01-01 RX ADMIN — Medication 81 MILLIGRAM(S): at 11:11

## 2023-01-01 RX ADMIN — LEVETIRACETAM 500 MILLIGRAM(S): 250 TABLET, FILM COATED ORAL at 18:18

## 2023-01-01 RX ADMIN — Medication 1300 MILLIGRAM(S): at 14:39

## 2023-01-01 RX ADMIN — MIDODRINE HYDROCHLORIDE 10 MILLIGRAM(S): 2.5 TABLET ORAL at 11:44

## 2023-01-01 RX ADMIN — Medication 1 APPLICATION(S): at 05:15

## 2023-01-01 RX ADMIN — Medication 1 APPLICATION(S): at 18:22

## 2023-01-01 RX ADMIN — HEPARIN SODIUM 5000 UNIT(S): 5000 INJECTION INTRAVENOUS; SUBCUTANEOUS at 17:47

## 2023-01-01 RX ADMIN — Medication 1 APPLICATION(S): at 18:03

## 2023-01-01 RX ADMIN — Medication 50 MILLIEQUIVALENT(S): at 12:47

## 2023-01-01 RX ADMIN — CHLORHEXIDINE GLUCONATE 15 MILLILITER(S): 213 SOLUTION TOPICAL at 05:02

## 2023-01-01 RX ADMIN — CHLORHEXIDINE GLUCONATE 1 APPLICATION(S): 213 SOLUTION TOPICAL at 05:04

## 2023-01-01 RX ADMIN — Medication 4 MILLILITER(S): at 20:05

## 2023-01-01 RX ADMIN — MIDODRINE HYDROCHLORIDE 10 MILLIGRAM(S): 2.5 TABLET ORAL at 05:39

## 2023-01-01 RX ADMIN — POLYETHYLENE GLYCOL 3350 17 GRAM(S): 17 POWDER, FOR SOLUTION ORAL at 12:53

## 2023-01-01 RX ADMIN — HEPARIN SODIUM 5000 UNIT(S): 5000 INJECTION INTRAVENOUS; SUBCUTANEOUS at 17:08

## 2023-01-01 RX ADMIN — CEFTOLOZANE AND TAZOBACTAM 100 MILLIGRAM(S): 1; .5 INJECTION, POWDER, LYOPHILIZED, FOR SOLUTION INTRAVENOUS at 16:38

## 2023-01-01 RX ADMIN — Medication 300 MILLIGRAM(S): at 11:16

## 2023-01-01 RX ADMIN — Medication 1 APPLICATION(S): at 05:34

## 2023-01-01 RX ADMIN — ATORVASTATIN CALCIUM 80 MILLIGRAM(S): 80 TABLET, FILM COATED ORAL at 22:13

## 2023-01-01 RX ADMIN — BUMETANIDE 2 MILLIGRAM(S): 0.25 INJECTION INTRAMUSCULAR; INTRAVENOUS at 05:35

## 2023-01-01 RX ADMIN — MIDODRINE HYDROCHLORIDE 10 MILLIGRAM(S): 2.5 TABLET ORAL at 12:05

## 2023-01-01 RX ADMIN — Medication 300 MILLIGRAM(S): at 13:25

## 2023-01-01 RX ADMIN — LACTULOSE 20 GRAM(S): 10 SOLUTION ORAL at 05:38

## 2023-01-01 RX ADMIN — ERGOCALCIFEROL 1000 UNIT(S): 1.25 CAPSULE ORAL at 14:40

## 2023-01-01 RX ADMIN — ATORVASTATIN CALCIUM 80 MILLIGRAM(S): 80 TABLET, FILM COATED ORAL at 21:11

## 2023-01-01 RX ADMIN — PANTOPRAZOLE SODIUM 40 MILLIGRAM(S): 20 TABLET, DELAYED RELEASE ORAL at 05:55

## 2023-01-01 RX ADMIN — MIDODRINE HYDROCHLORIDE 10 MILLIGRAM(S): 2.5 TABLET ORAL at 05:06

## 2023-01-01 RX ADMIN — Medication 3: at 17:00

## 2023-01-01 RX ADMIN — Medication 1 MILLIGRAM(S): at 21:20

## 2023-01-01 RX ADMIN — Medication 1: at 13:11

## 2023-01-01 RX ADMIN — Medication 50 MILLIGRAM(S): at 14:24

## 2023-01-01 RX ADMIN — ERGOCALCIFEROL 4000 UNIT(S): 1.25 CAPSULE ORAL at 13:42

## 2023-01-01 RX ADMIN — CHLORHEXIDINE GLUCONATE 15 MILLILITER(S): 213 SOLUTION TOPICAL at 17:59

## 2023-01-01 RX ADMIN — CHLORHEXIDINE GLUCONATE 1 APPLICATION(S): 213 SOLUTION TOPICAL at 05:33

## 2023-01-01 RX ADMIN — CHLORHEXIDINE GLUCONATE 1 APPLICATION(S): 213 SOLUTION TOPICAL at 06:08

## 2023-01-01 RX ADMIN — Medication 1 APPLICATION(S): at 18:00

## 2023-01-01 RX ADMIN — PANTOPRAZOLE SODIUM 40 MILLIGRAM(S): 20 TABLET, DELAYED RELEASE ORAL at 05:25

## 2023-01-01 RX ADMIN — PANTOPRAZOLE SODIUM 40 MILLIGRAM(S): 20 TABLET, DELAYED RELEASE ORAL at 05:49

## 2023-01-01 RX ADMIN — Medication 5 MILLIGRAM(S): at 12:31

## 2023-01-01 RX ADMIN — POLYETHYLENE GLYCOL 3350 17 GRAM(S): 17 POWDER, FOR SOLUTION ORAL at 12:14

## 2023-01-01 RX ADMIN — MIDODRINE HYDROCHLORIDE 10 MILLIGRAM(S): 2.5 TABLET ORAL at 05:19

## 2023-01-01 RX ADMIN — CHLORHEXIDINE GLUCONATE 1 APPLICATION(S): 213 SOLUTION TOPICAL at 06:06

## 2023-01-01 RX ADMIN — CEFTOLOZANE AND TAZOBACTAM 100 MILLIGRAM(S): 1; .5 INJECTION, POWDER, LYOPHILIZED, FOR SOLUTION INTRAVENOUS at 14:49

## 2023-01-01 RX ADMIN — BUMETANIDE 2 MILLIGRAM(S): 0.25 INJECTION INTRAMUSCULAR; INTRAVENOUS at 17:28

## 2023-01-01 RX ADMIN — HEPARIN SODIUM 5000 UNIT(S): 5000 INJECTION INTRAVENOUS; SUBCUTANEOUS at 05:22

## 2023-01-01 RX ADMIN — Medication 4 MILLILITER(S): at 02:15

## 2023-01-01 RX ADMIN — BUMETANIDE 2 MILLIGRAM(S): 0.25 INJECTION INTRAMUSCULAR; INTRAVENOUS at 18:10

## 2023-01-01 RX ADMIN — Medication 1: at 05:01

## 2023-01-01 RX ADMIN — LEVETIRACETAM 500 MILLIGRAM(S): 250 TABLET, FILM COATED ORAL at 05:03

## 2023-01-01 RX ADMIN — Medication 4 MILLILITER(S): at 07:43

## 2023-01-01 RX ADMIN — CEFTOLOZANE AND TAZOBACTAM 100 MILLIGRAM(S): 1; .5 INJECTION, POWDER, LYOPHILIZED, FOR SOLUTION INTRAVENOUS at 06:14

## 2023-01-01 RX ADMIN — Medication 4 MILLILITER(S): at 13:32

## 2023-01-01 RX ADMIN — LEVETIRACETAM 500 MILLIGRAM(S): 250 TABLET, FILM COATED ORAL at 05:26

## 2023-01-01 RX ADMIN — MIDODRINE HYDROCHLORIDE 10 MILLIGRAM(S): 2.5 TABLET ORAL at 13:00

## 2023-01-01 RX ADMIN — LEVETIRACETAM 500 MILLIGRAM(S): 250 TABLET, FILM COATED ORAL at 17:05

## 2023-01-01 RX ADMIN — HEPARIN SODIUM 5000 UNIT(S): 5000 INJECTION INTRAVENOUS; SUBCUTANEOUS at 18:24

## 2023-01-01 RX ADMIN — LEVETIRACETAM 500 MILLIGRAM(S): 250 TABLET, FILM COATED ORAL at 05:11

## 2023-01-01 RX ADMIN — Medication 1 APPLICATION(S): at 06:49

## 2023-01-01 RX ADMIN — SODIUM ZIRCONIUM CYCLOSILICATE 5 GRAM(S): 10 POWDER, FOR SUSPENSION ORAL at 12:23

## 2023-01-01 RX ADMIN — INSULIN GLARGINE 10 UNIT(S): 100 INJECTION, SOLUTION SUBCUTANEOUS at 21:57

## 2023-01-01 RX ADMIN — Medication 1 APPLICATION(S): at 17:51

## 2023-01-01 RX ADMIN — BUMETANIDE 2 MILLIGRAM(S): 0.25 INJECTION INTRAMUSCULAR; INTRAVENOUS at 05:20

## 2023-01-01 RX ADMIN — LEVETIRACETAM 500 MILLIGRAM(S): 250 TABLET, FILM COATED ORAL at 05:45

## 2023-01-01 RX ADMIN — AMIODARONE HYDROCHLORIDE 200 MILLIGRAM(S): 400 TABLET ORAL at 05:48

## 2023-01-01 RX ADMIN — SODIUM ZIRCONIUM CYCLOSILICATE 5 GRAM(S): 10 POWDER, FOR SUSPENSION ORAL at 12:06

## 2023-01-01 RX ADMIN — CHLORHEXIDINE GLUCONATE 1 APPLICATION(S): 213 SOLUTION TOPICAL at 11:44

## 2023-01-01 RX ADMIN — AMIODARONE HYDROCHLORIDE 400 MILLIGRAM(S): 400 TABLET ORAL at 14:39

## 2023-01-01 RX ADMIN — Medication 650 MILLIGRAM(S): at 20:31

## 2023-01-01 RX ADMIN — ATORVASTATIN CALCIUM 80 MILLIGRAM(S): 80 TABLET, FILM COATED ORAL at 22:09

## 2023-01-01 RX ADMIN — BUMETANIDE 2 MILLIGRAM(S): 0.25 INJECTION INTRAMUSCULAR; INTRAVENOUS at 18:01

## 2023-01-01 RX ADMIN — CHLORHEXIDINE GLUCONATE 15 MILLILITER(S): 213 SOLUTION TOPICAL at 05:09

## 2023-01-01 RX ADMIN — MIDODRINE HYDROCHLORIDE 10 MILLIGRAM(S): 2.5 TABLET ORAL at 06:04

## 2023-01-01 RX ADMIN — PANTOPRAZOLE SODIUM 40 MILLIGRAM(S): 20 TABLET, DELAYED RELEASE ORAL at 17:52

## 2023-01-01 RX ADMIN — AMIODARONE HYDROCHLORIDE 200 MILLIGRAM(S): 400 TABLET ORAL at 05:55

## 2023-01-01 RX ADMIN — HEPARIN SODIUM 5000 UNIT(S): 5000 INJECTION INTRAVENOUS; SUBCUTANEOUS at 20:22

## 2023-01-01 RX ADMIN — Medication 1 APPLICATION(S): at 05:08

## 2023-01-01 RX ADMIN — HEPARIN SODIUM 5000 UNIT(S): 5000 INJECTION INTRAVENOUS; SUBCUTANEOUS at 05:02

## 2023-01-01 RX ADMIN — LEVETIRACETAM 500 MILLIGRAM(S): 250 TABLET, FILM COATED ORAL at 17:39

## 2023-01-01 RX ADMIN — SODIUM ZIRCONIUM CYCLOSILICATE 10 GRAM(S): 10 POWDER, FOR SUSPENSION ORAL at 13:05

## 2023-01-01 RX ADMIN — SODIUM ZIRCONIUM CYCLOSILICATE 5 GRAM(S): 10 POWDER, FOR SUSPENSION ORAL at 12:32

## 2023-01-01 RX ADMIN — Medication 1300 MILLIGRAM(S): at 22:31

## 2023-01-01 RX ADMIN — Medication 4 MILLILITER(S): at 08:32

## 2023-01-01 RX ADMIN — HEPARIN SODIUM 5000 UNIT(S): 5000 INJECTION INTRAVENOUS; SUBCUTANEOUS at 18:20

## 2023-01-01 RX ADMIN — LEVETIRACETAM 500 MILLIGRAM(S): 250 TABLET, FILM COATED ORAL at 05:00

## 2023-01-01 RX ADMIN — CHLORHEXIDINE GLUCONATE 1 APPLICATION(S): 213 SOLUTION TOPICAL at 05:20

## 2023-01-01 RX ADMIN — CEFEPIME 100 MILLIGRAM(S): 1 INJECTION, POWDER, FOR SOLUTION INTRAMUSCULAR; INTRAVENOUS at 06:03

## 2023-01-01 RX ADMIN — BUMETANIDE 2 MILLIGRAM(S): 0.25 INJECTION INTRAMUSCULAR; INTRAVENOUS at 06:22

## 2023-01-01 RX ADMIN — LEVETIRACETAM 500 MILLIGRAM(S): 250 TABLET, FILM COATED ORAL at 17:28

## 2023-01-01 RX ADMIN — Medication 1 APPLICATION(S): at 05:44

## 2023-01-01 RX ADMIN — HEPARIN SODIUM 5000 UNIT(S): 5000 INJECTION INTRAVENOUS; SUBCUTANEOUS at 06:12

## 2023-01-01 RX ADMIN — LEVETIRACETAM 500 MILLIGRAM(S): 250 TABLET, FILM COATED ORAL at 05:39

## 2023-01-01 RX ADMIN — PANTOPRAZOLE SODIUM 40 MILLIGRAM(S): 20 TABLET, DELAYED RELEASE ORAL at 05:39

## 2023-01-01 RX ADMIN — PANTOPRAZOLE SODIUM 40 MILLIGRAM(S): 20 TABLET, DELAYED RELEASE ORAL at 17:44

## 2023-01-01 RX ADMIN — BUMETANIDE 2 MILLIGRAM(S): 0.25 INJECTION INTRAMUSCULAR; INTRAVENOUS at 17:26

## 2023-01-01 RX ADMIN — Medication 300 MILLIGRAM(S): at 11:17

## 2023-01-01 RX ADMIN — CHLORHEXIDINE GLUCONATE 1 APPLICATION(S): 213 SOLUTION TOPICAL at 05:28

## 2023-01-01 RX ADMIN — Medication 3 MILLILITER(S): at 07:40

## 2023-01-01 RX ADMIN — CHLORHEXIDINE GLUCONATE 15 MILLILITER(S): 213 SOLUTION TOPICAL at 17:17

## 2023-01-01 RX ADMIN — LEVETIRACETAM 500 MILLIGRAM(S): 250 TABLET, FILM COATED ORAL at 05:47

## 2023-01-01 RX ADMIN — ATORVASTATIN CALCIUM 80 MILLIGRAM(S): 80 TABLET, FILM COATED ORAL at 21:17

## 2023-01-01 RX ADMIN — Medication 1: at 05:02

## 2023-01-01 RX ADMIN — Medication 3 MILLILITER(S): at 14:23

## 2023-01-01 RX ADMIN — MIDODRINE HYDROCHLORIDE 10 MILLIGRAM(S): 2.5 TABLET ORAL at 22:03

## 2023-01-01 RX ADMIN — CHLORHEXIDINE GLUCONATE 1 APPLICATION(S): 213 SOLUTION TOPICAL at 05:55

## 2023-01-01 RX ADMIN — Medication 3 MILLILITER(S): at 01:26

## 2023-01-01 RX ADMIN — HEPARIN SODIUM 5000 UNIT(S): 5000 INJECTION INTRAVENOUS; SUBCUTANEOUS at 05:04

## 2023-01-01 RX ADMIN — Medication 1 APPLICATION(S): at 05:22

## 2023-01-01 RX ADMIN — ATORVASTATIN CALCIUM 80 MILLIGRAM(S): 80 TABLET, FILM COATED ORAL at 21:54

## 2023-01-01 RX ADMIN — SODIUM CHLORIDE 1000 MILLILITER(S): 9 INJECTION, SOLUTION INTRAVENOUS at 09:20

## 2023-01-01 RX ADMIN — POLYETHYLENE GLYCOL 3350 17 GRAM(S): 17 POWDER, FOR SOLUTION ORAL at 13:18

## 2023-01-01 RX ADMIN — Medication 8.28 MICROGRAM(S)/KG/MIN: at 11:45

## 2023-01-01 RX ADMIN — BUMETANIDE 2 MILLIGRAM(S): 0.25 INJECTION INTRAMUSCULAR; INTRAVENOUS at 18:09

## 2023-01-01 RX ADMIN — Medication 1 APPLICATION(S): at 06:19

## 2023-01-01 RX ADMIN — Medication 3 MILLILITER(S): at 02:34

## 2023-01-01 RX ADMIN — Medication 1 APPLICATION(S): at 06:20

## 2023-01-01 RX ADMIN — HEPARIN SODIUM 5000 UNIT(S): 5000 INJECTION INTRAVENOUS; SUBCUTANEOUS at 06:09

## 2023-01-01 RX ADMIN — Medication 120 MILLIGRAM(S): at 08:13

## 2023-01-01 RX ADMIN — Medication 500 MILLIGRAM(S): at 14:24

## 2023-01-01 RX ADMIN — PANTOPRAZOLE SODIUM 80 MILLIGRAM(S): 20 TABLET, DELAYED RELEASE ORAL at 00:25

## 2023-01-01 RX ADMIN — PANTOPRAZOLE SODIUM 40 MILLIGRAM(S): 20 TABLET, DELAYED RELEASE ORAL at 05:00

## 2023-01-01 RX ADMIN — ATORVASTATIN CALCIUM 80 MILLIGRAM(S): 80 TABLET, FILM COATED ORAL at 21:32

## 2023-01-01 RX ADMIN — MIDODRINE HYDROCHLORIDE 10 MILLIGRAM(S): 2.5 TABLET ORAL at 11:35

## 2023-01-01 RX ADMIN — SODIUM ZIRCONIUM CYCLOSILICATE 5 GRAM(S): 10 POWDER, FOR SUSPENSION ORAL at 11:22

## 2023-01-01 RX ADMIN — HEPARIN SODIUM 5000 UNIT(S): 5000 INJECTION INTRAVENOUS; SUBCUTANEOUS at 06:00

## 2023-01-01 RX ADMIN — Medication 1300 MILLIGRAM(S): at 14:27

## 2023-01-01 RX ADMIN — Medication 3 MILLILITER(S): at 20:30

## 2023-01-01 RX ADMIN — Medication 4 MILLIGRAM(S): at 12:40

## 2023-01-01 RX ADMIN — Medication 1 APPLICATION(S): at 05:37

## 2023-01-01 RX ADMIN — HEPARIN SODIUM 5000 UNIT(S): 5000 INJECTION INTRAVENOUS; SUBCUTANEOUS at 18:15

## 2023-01-01 RX ADMIN — MIDODRINE HYDROCHLORIDE 10 MILLIGRAM(S): 2.5 TABLET ORAL at 18:18

## 2023-01-01 RX ADMIN — Medication 1 APPLICATION(S): at 17:09

## 2023-01-01 RX ADMIN — Medication 4 MILLILITER(S): at 02:48

## 2023-01-01 RX ADMIN — ZINC SULFATE TAB 220 MG (50 MG ZINC EQUIVALENT) 220 MILLIGRAM(S): 220 (50 ZN) TAB at 11:43

## 2023-01-01 RX ADMIN — LACTULOSE 20 GRAM(S): 10 SOLUTION ORAL at 22:46

## 2023-01-01 RX ADMIN — SCOPALAMINE 1 PATCH: 1 PATCH, EXTENDED RELEASE TRANSDERMAL at 06:23

## 2023-01-01 RX ADMIN — Medication 500 MILLIGRAM(S): at 11:35

## 2023-01-01 RX ADMIN — Medication 1 MILLIGRAM(S): at 11:42

## 2023-01-01 RX ADMIN — POLYETHYLENE GLYCOL 3350 17 GRAM(S): 17 POWDER, FOR SOLUTION ORAL at 05:24

## 2023-01-01 RX ADMIN — HEPARIN SODIUM 5000 UNIT(S): 5000 INJECTION INTRAVENOUS; SUBCUTANEOUS at 05:27

## 2023-01-01 RX ADMIN — AMIODARONE HYDROCHLORIDE 200 MILLIGRAM(S): 400 TABLET ORAL at 05:15

## 2023-01-01 RX ADMIN — Medication 1300 MILLIGRAM(S): at 21:36

## 2023-01-01 RX ADMIN — Medication 250 MILLIGRAM(S): at 23:04

## 2023-01-01 RX ADMIN — Medication 1 APPLICATION(S): at 05:19

## 2023-01-01 RX ADMIN — AMIODARONE HYDROCHLORIDE 200 MILLIGRAM(S): 400 TABLET ORAL at 05:32

## 2023-01-01 RX ADMIN — Medication 120 MILLIGRAM(S): at 05:26

## 2023-01-01 RX ADMIN — ERGOCALCIFEROL 4000 UNIT(S): 1.25 CAPSULE ORAL at 18:43

## 2023-01-01 RX ADMIN — Medication 500 MILLIGRAM(S): at 11:17

## 2023-01-01 RX ADMIN — SODIUM ZIRCONIUM CYCLOSILICATE 10 GRAM(S): 10 POWDER, FOR SUSPENSION ORAL at 18:29

## 2023-01-01 RX ADMIN — Medication 81 MILLIGRAM(S): at 14:06

## 2023-01-01 RX ADMIN — HEPARIN SODIUM 5000 UNIT(S): 5000 INJECTION INTRAVENOUS; SUBCUTANEOUS at 05:40

## 2023-01-01 RX ADMIN — Medication 3 MILLILITER(S): at 15:50

## 2023-01-01 RX ADMIN — PANTOPRAZOLE SODIUM 40 MILLIGRAM(S): 20 TABLET, DELAYED RELEASE ORAL at 12:56

## 2023-01-01 RX ADMIN — Medication 3 MILLILITER(S): at 20:35

## 2023-01-01 RX ADMIN — HEPARIN SODIUM 5000 UNIT(S): 5000 INJECTION INTRAVENOUS; SUBCUTANEOUS at 17:28

## 2023-01-01 RX ADMIN — Medication 1300 MILLIGRAM(S): at 21:41

## 2023-01-01 RX ADMIN — Medication 1 APPLICATION(S): at 18:43

## 2023-01-01 RX ADMIN — CEFTOLOZANE AND TAZOBACTAM 100 MILLIGRAM(S): 1; .5 INJECTION, POWDER, LYOPHILIZED, FOR SOLUTION INTRAVENOUS at 21:14

## 2023-01-01 RX ADMIN — CHLORHEXIDINE GLUCONATE 1 APPLICATION(S): 213 SOLUTION TOPICAL at 06:04

## 2023-01-01 RX ADMIN — Medication 1 APPLICATION(S): at 17:25

## 2023-01-01 RX ADMIN — Medication 1 MILLIGRAM(S): at 11:44

## 2023-01-01 RX ADMIN — ATORVASTATIN CALCIUM 80 MILLIGRAM(S): 80 TABLET, FILM COATED ORAL at 22:05

## 2023-01-01 RX ADMIN — Medication 1 TABLET(S): at 11:44

## 2023-01-01 RX ADMIN — CHLORHEXIDINE GLUCONATE 15 MILLILITER(S): 213 SOLUTION TOPICAL at 06:10

## 2023-01-01 RX ADMIN — SODIUM ZIRCONIUM CYCLOSILICATE 5 GRAM(S): 10 POWDER, FOR SUSPENSION ORAL at 11:14

## 2023-01-01 RX ADMIN — Medication 120 MILLIGRAM(S): at 05:52

## 2023-01-01 RX ADMIN — Medication 1 APPLICATION(S): at 17:10

## 2023-01-01 RX ADMIN — HEPARIN SODIUM 5000 UNIT(S): 5000 INJECTION INTRAVENOUS; SUBCUTANEOUS at 18:02

## 2023-01-01 RX ADMIN — Medication 5 MILLIGRAM(S): at 14:08

## 2023-01-01 RX ADMIN — CHLORHEXIDINE GLUCONATE 1 APPLICATION(S): 213 SOLUTION TOPICAL at 06:12

## 2023-01-01 RX ADMIN — CHLORHEXIDINE GLUCONATE 15 MILLILITER(S): 213 SOLUTION TOPICAL at 05:30

## 2023-01-01 RX ADMIN — CHLORHEXIDINE GLUCONATE 15 MILLILITER(S): 213 SOLUTION TOPICAL at 05:04

## 2023-01-01 RX ADMIN — Medication 4 MILLILITER(S): at 20:12

## 2023-01-01 RX ADMIN — Medication 8.28 MICROGRAM(S)/KG/MIN: at 21:49

## 2023-01-01 RX ADMIN — PANTOPRAZOLE SODIUM 40 MILLIGRAM(S): 20 TABLET, DELAYED RELEASE ORAL at 17:57

## 2023-01-01 RX ADMIN — LEVETIRACETAM 500 MILLIGRAM(S): 250 TABLET, FILM COATED ORAL at 18:38

## 2023-01-01 RX ADMIN — Medication 1: at 05:31

## 2023-01-01 RX ADMIN — Medication 81 MILLIGRAM(S): at 11:42

## 2023-01-01 RX ADMIN — LEVETIRACETAM 500 MILLIGRAM(S): 250 TABLET, FILM COATED ORAL at 05:14

## 2023-01-01 RX ADMIN — Medication 1 APPLICATION(S): at 06:54

## 2023-01-01 RX ADMIN — Medication 4 MILLILITER(S): at 21:12

## 2023-01-01 RX ADMIN — Medication 2 MILLIGRAM(S): at 14:44

## 2023-01-01 RX ADMIN — SODIUM ZIRCONIUM CYCLOSILICATE 10 GRAM(S): 10 POWDER, FOR SUSPENSION ORAL at 06:43

## 2023-01-01 RX ADMIN — Medication 1300 MILLIGRAM(S): at 05:13

## 2023-01-01 RX ADMIN — Medication 3 MILLILITER(S): at 13:53

## 2023-01-01 RX ADMIN — Medication 1 APPLICATION(S): at 05:18

## 2023-01-01 RX ADMIN — Medication 1 APPLICATION(S): at 05:01

## 2023-01-01 RX ADMIN — CHLORHEXIDINE GLUCONATE 15 MILLILITER(S): 213 SOLUTION TOPICAL at 18:09

## 2023-01-01 RX ADMIN — ATORVASTATIN CALCIUM 80 MILLIGRAM(S): 80 TABLET, FILM COATED ORAL at 22:14

## 2023-01-01 RX ADMIN — Medication 1 APPLICATION(S): at 18:54

## 2023-01-01 RX ADMIN — Medication 50 MILLIGRAM(S): at 05:55

## 2023-01-01 RX ADMIN — PANTOPRAZOLE SODIUM 40 MILLIGRAM(S): 20 TABLET, DELAYED RELEASE ORAL at 17:59

## 2023-01-01 RX ADMIN — ATORVASTATIN CALCIUM 80 MILLIGRAM(S): 80 TABLET, FILM COATED ORAL at 22:42

## 2023-01-01 RX ADMIN — Medication 1 MILLIGRAM(S): at 05:54

## 2023-01-01 RX ADMIN — PANTOPRAZOLE SODIUM 40 MILLIGRAM(S): 20 TABLET, DELAYED RELEASE ORAL at 17:17

## 2023-01-01 RX ADMIN — CHLORHEXIDINE GLUCONATE 15 MILLILITER(S): 213 SOLUTION TOPICAL at 06:20

## 2023-01-01 RX ADMIN — LACOSAMIDE 100 MILLIGRAM(S): 50 TABLET ORAL at 18:15

## 2023-01-01 RX ADMIN — SODIUM CHLORIDE 250 MILLILITER(S): 9 INJECTION, SOLUTION INTRAVENOUS at 13:12

## 2023-01-01 RX ADMIN — ERGOCALCIFEROL 4000 UNIT(S): 1.25 CAPSULE ORAL at 11:46

## 2023-01-01 RX ADMIN — Medication 81 MILLIGRAM(S): at 12:23

## 2023-01-01 RX ADMIN — ATORVASTATIN CALCIUM 80 MILLIGRAM(S): 80 TABLET, FILM COATED ORAL at 22:20

## 2023-01-01 RX ADMIN — SODIUM ZIRCONIUM CYCLOSILICATE 10 GRAM(S): 10 POWDER, FOR SUSPENSION ORAL at 12:28

## 2023-01-01 RX ADMIN — PANTOPRAZOLE SODIUM 40 MILLIGRAM(S): 20 TABLET, DELAYED RELEASE ORAL at 05:07

## 2023-01-01 RX ADMIN — MIDODRINE HYDROCHLORIDE 10 MILLIGRAM(S): 2.5 TABLET ORAL at 05:48

## 2023-01-01 RX ADMIN — Medication 120 MILLIGRAM(S): at 05:37

## 2023-01-01 RX ADMIN — Medication 50 MILLIGRAM(S): at 21:12

## 2023-01-01 RX ADMIN — LACOSAMIDE 100 MILLIGRAM(S): 50 TABLET ORAL at 18:21

## 2023-01-01 RX ADMIN — BUMETANIDE 2 MILLIGRAM(S): 0.25 INJECTION INTRAMUSCULAR; INTRAVENOUS at 20:26

## 2023-01-01 RX ADMIN — HEPARIN SODIUM 5000 UNIT(S): 5000 INJECTION INTRAVENOUS; SUBCUTANEOUS at 05:19

## 2023-01-01 RX ADMIN — HEPARIN SODIUM 5000 UNIT(S): 5000 INJECTION INTRAVENOUS; SUBCUTANEOUS at 18:54

## 2023-01-01 RX ADMIN — CHLORHEXIDINE GLUCONATE 1 APPLICATION(S): 213 SOLUTION TOPICAL at 07:09

## 2023-01-01 RX ADMIN — LEVETIRACETAM 500 MILLIGRAM(S): 250 TABLET, FILM COATED ORAL at 19:07

## 2023-01-01 RX ADMIN — LEVETIRACETAM 500 MILLIGRAM(S): 250 TABLET, FILM COATED ORAL at 17:40

## 2023-01-01 RX ADMIN — CEFTOLOZANE AND TAZOBACTAM 100 MILLIGRAM(S): 1; .5 INJECTION, POWDER, LYOPHILIZED, FOR SOLUTION INTRAVENOUS at 05:08

## 2023-01-01 RX ADMIN — SCOPALAMINE 1 PATCH: 1 PATCH, EXTENDED RELEASE TRANSDERMAL at 08:05

## 2023-01-01 RX ADMIN — ZINC SULFATE TAB 220 MG (50 MG ZINC EQUIVALENT) 220 MILLIGRAM(S): 220 (50 ZN) TAB at 11:35

## 2023-01-01 RX ADMIN — PANTOPRAZOLE SODIUM 40 MILLIGRAM(S): 20 TABLET, DELAYED RELEASE ORAL at 05:10

## 2023-01-01 RX ADMIN — Medication 3 MILLILITER(S): at 08:32

## 2023-01-01 RX ADMIN — SENNA PLUS 2 TABLET(S): 8.6 TABLET ORAL at 22:45

## 2023-01-01 RX ADMIN — CHLORHEXIDINE GLUCONATE 15 MILLILITER(S): 213 SOLUTION TOPICAL at 17:46

## 2023-01-01 RX ADMIN — Medication 1: at 06:00

## 2023-01-01 RX ADMIN — HEPARIN SODIUM 5000 UNIT(S): 5000 INJECTION INTRAVENOUS; SUBCUTANEOUS at 17:32

## 2023-01-01 RX ADMIN — AMIODARONE HYDROCHLORIDE 200 MILLIGRAM(S): 400 TABLET ORAL at 05:26

## 2023-01-01 RX ADMIN — Medication 2: at 07:00

## 2023-01-01 RX ADMIN — Medication 650 MILLIGRAM(S): at 22:25

## 2023-01-01 RX ADMIN — BUMETANIDE 2 MILLIGRAM(S): 0.25 INJECTION INTRAMUSCULAR; INTRAVENOUS at 18:03

## 2023-01-01 RX ADMIN — Medication 81 MILLIGRAM(S): at 12:02

## 2023-01-01 RX ADMIN — HEPARIN SODIUM 5000 UNIT(S): 5000 INJECTION INTRAVENOUS; SUBCUTANEOUS at 18:18

## 2023-01-01 RX ADMIN — Medication 1 APPLICATION(S): at 05:32

## 2023-01-01 RX ADMIN — PANTOPRAZOLE SODIUM 40 MILLIGRAM(S): 20 TABLET, DELAYED RELEASE ORAL at 17:35

## 2023-01-01 RX ADMIN — INSULIN GLARGINE 10 UNIT(S): 100 INJECTION, SOLUTION SUBCUTANEOUS at 22:01

## 2023-01-01 RX ADMIN — Medication 1: at 18:29

## 2023-01-01 RX ADMIN — HEPARIN SODIUM 5000 UNIT(S): 5000 INJECTION INTRAVENOUS; SUBCUTANEOUS at 17:42

## 2023-01-01 RX ADMIN — Medication 1: at 12:04

## 2023-01-01 RX ADMIN — Medication 3 MILLILITER(S): at 23:13

## 2023-01-01 RX ADMIN — ATORVASTATIN CALCIUM 80 MILLIGRAM(S): 80 TABLET, FILM COATED ORAL at 22:24

## 2023-01-01 RX ADMIN — CHLORHEXIDINE GLUCONATE 15 MILLILITER(S): 213 SOLUTION TOPICAL at 17:16

## 2023-01-01 RX ADMIN — POLYETHYLENE GLYCOL 3350 17 GRAM(S): 17 POWDER, FOR SOLUTION ORAL at 11:48

## 2023-01-01 RX ADMIN — ATORVASTATIN CALCIUM 80 MILLIGRAM(S): 80 TABLET, FILM COATED ORAL at 21:15

## 2023-01-01 RX ADMIN — BUMETANIDE 2 MILLIGRAM(S): 0.25 INJECTION INTRAMUSCULAR; INTRAVENOUS at 17:40

## 2023-01-01 RX ADMIN — LACOSAMIDE 100 MILLIGRAM(S): 50 TABLET ORAL at 05:46

## 2023-01-01 RX ADMIN — LEVETIRACETAM 500 MILLIGRAM(S): 250 TABLET, FILM COATED ORAL at 18:24

## 2023-01-01 RX ADMIN — Medication 120 MILLIGRAM(S): at 05:12

## 2023-01-01 RX ADMIN — INSULIN GLARGINE 10 UNIT(S): 100 INJECTION, SOLUTION SUBCUTANEOUS at 21:48

## 2023-01-01 RX ADMIN — AMIODARONE HYDROCHLORIDE 400 MILLIGRAM(S): 400 TABLET ORAL at 05:05

## 2023-01-01 RX ADMIN — LEVETIRACETAM 500 MILLIGRAM(S): 250 TABLET, FILM COATED ORAL at 18:30

## 2023-01-01 RX ADMIN — CHLORHEXIDINE GLUCONATE 1 APPLICATION(S): 213 SOLUTION TOPICAL at 05:00

## 2023-01-01 RX ADMIN — Medication 3: at 11:38

## 2023-01-01 RX ADMIN — Medication 4 MILLILITER(S): at 01:26

## 2023-01-01 RX ADMIN — Medication 4 MILLIGRAM(S): at 21:11

## 2023-01-01 RX ADMIN — LEVETIRACETAM 500 MILLIGRAM(S): 250 TABLET, FILM COATED ORAL at 18:21

## 2023-01-01 RX ADMIN — SCOPALAMINE 1 PATCH: 1 PATCH, EXTENDED RELEASE TRANSDERMAL at 18:29

## 2023-01-01 RX ADMIN — SENNA PLUS 2 TABLET(S): 8.6 TABLET ORAL at 21:30

## 2023-01-01 RX ADMIN — PANTOPRAZOLE SODIUM 40 MILLIGRAM(S): 20 TABLET, DELAYED RELEASE ORAL at 05:18

## 2023-01-01 RX ADMIN — Medication 120 MILLIGRAM(S): at 05:34

## 2023-01-01 RX ADMIN — HEPARIN SODIUM 5000 UNIT(S): 5000 INJECTION INTRAVENOUS; SUBCUTANEOUS at 18:21

## 2023-01-01 RX ADMIN — MIDODRINE HYDROCHLORIDE 10 MILLIGRAM(S): 2.5 TABLET ORAL at 17:05

## 2023-01-01 RX ADMIN — BUMETANIDE 2 MILLIGRAM(S): 0.25 INJECTION INTRAMUSCULAR; INTRAVENOUS at 05:10

## 2023-01-01 RX ADMIN — Medication 120 MILLIGRAM(S): at 05:24

## 2023-01-01 RX ADMIN — ATORVASTATIN CALCIUM 80 MILLIGRAM(S): 80 TABLET, FILM COATED ORAL at 21:34

## 2023-01-01 RX ADMIN — BUMETANIDE 2 MILLIGRAM(S): 0.25 INJECTION INTRAMUSCULAR; INTRAVENOUS at 17:41

## 2023-01-01 RX ADMIN — Medication 2 MILLIGRAM(S): at 11:30

## 2023-01-01 RX ADMIN — AMIODARONE HYDROCHLORIDE 200 MILLIGRAM(S): 400 TABLET ORAL at 05:01

## 2023-01-01 RX ADMIN — Medication 1 APPLICATION(S): at 06:46

## 2023-01-01 RX ADMIN — ATORVASTATIN CALCIUM 80 MILLIGRAM(S): 80 TABLET, FILM COATED ORAL at 21:26

## 2023-01-01 RX ADMIN — ATORVASTATIN CALCIUM 80 MILLIGRAM(S): 80 TABLET, FILM COATED ORAL at 21:20

## 2023-01-01 RX ADMIN — Medication 50 MILLIGRAM(S): at 15:38

## 2023-01-01 RX ADMIN — LEVETIRACETAM 500 MILLIGRAM(S): 250 TABLET, FILM COATED ORAL at 06:16

## 2023-01-01 RX ADMIN — BUMETANIDE 2 MILLIGRAM(S): 0.25 INJECTION INTRAMUSCULAR; INTRAVENOUS at 19:22

## 2023-01-01 RX ADMIN — ATORVASTATIN CALCIUM 80 MILLIGRAM(S): 80 TABLET, FILM COATED ORAL at 22:17

## 2023-01-01 RX ADMIN — BUMETANIDE 2 MILLIGRAM(S): 0.25 INJECTION INTRAMUSCULAR; INTRAVENOUS at 05:12

## 2023-01-01 RX ADMIN — Medication 40 MICROGRAM(S): at 09:50

## 2023-01-01 RX ADMIN — Medication 300 MILLIGRAM(S): at 11:41

## 2023-01-01 RX ADMIN — SODIUM ZIRCONIUM CYCLOSILICATE 10 GRAM(S): 10 POWDER, FOR SUSPENSION ORAL at 17:19

## 2023-01-01 RX ADMIN — ERGOCALCIFEROL 4000 UNIT(S): 1.25 CAPSULE ORAL at 17:52

## 2023-01-01 RX ADMIN — CHLORHEXIDINE GLUCONATE 15 MILLILITER(S): 213 SOLUTION TOPICAL at 18:26

## 2023-01-01 RX ADMIN — AMIODARONE HYDROCHLORIDE 400 MILLIGRAM(S): 400 TABLET ORAL at 15:48

## 2023-01-01 RX ADMIN — ZINC SULFATE TAB 220 MG (50 MG ZINC EQUIVALENT) 220 MILLIGRAM(S): 220 (50 ZN) TAB at 11:46

## 2023-01-01 RX ADMIN — Medication 1 APPLICATION(S): at 06:13

## 2023-01-01 RX ADMIN — LEVETIRACETAM 500 MILLIGRAM(S): 250 TABLET, FILM COATED ORAL at 06:13

## 2023-01-01 RX ADMIN — CHLORHEXIDINE GLUCONATE 15 MILLILITER(S): 213 SOLUTION TOPICAL at 17:10

## 2023-01-01 RX ADMIN — Medication 3 MILLILITER(S): at 01:53

## 2023-01-01 RX ADMIN — Medication 4 MILLILITER(S): at 08:13

## 2023-01-01 RX ADMIN — MIDODRINE HYDROCHLORIDE 10 MILLIGRAM(S): 2.5 TABLET ORAL at 11:11

## 2023-01-01 RX ADMIN — CHLORHEXIDINE GLUCONATE 15 MILLILITER(S): 213 SOLUTION TOPICAL at 05:13

## 2023-01-01 RX ADMIN — AMIODARONE HYDROCHLORIDE 200 MILLIGRAM(S): 400 TABLET ORAL at 05:53

## 2023-01-01 RX ADMIN — PANTOPRAZOLE SODIUM 10 MG/HR: 20 TABLET, DELAYED RELEASE ORAL at 02:27

## 2023-01-01 RX ADMIN — Medication 1: at 12:12

## 2023-01-01 RX ADMIN — Medication 1 APPLICATION(S): at 19:03

## 2023-01-01 RX ADMIN — CHLORHEXIDINE GLUCONATE 1 APPLICATION(S): 213 SOLUTION TOPICAL at 05:48

## 2023-01-01 RX ADMIN — Medication 650 MILLIGRAM(S): at 21:01

## 2023-01-01 RX ADMIN — CHLORHEXIDINE GLUCONATE 15 MILLILITER(S): 213 SOLUTION TOPICAL at 17:48

## 2023-01-01 RX ADMIN — Medication 4 MILLILITER(S): at 08:29

## 2023-01-01 RX ADMIN — Medication 300 MILLIGRAM(S): at 11:30

## 2023-01-01 RX ADMIN — HEPARIN SODIUM 5000 UNIT(S): 5000 INJECTION INTRAVENOUS; SUBCUTANEOUS at 05:05

## 2023-01-01 RX ADMIN — Medication 4 MILLIGRAM(S): at 18:08

## 2023-01-01 RX ADMIN — CHLORHEXIDINE GLUCONATE 1 APPLICATION(S): 213 SOLUTION TOPICAL at 06:05

## 2023-01-01 RX ADMIN — Medication 5 MILLIGRAM(S): at 21:21

## 2023-01-01 RX ADMIN — Medication 1: at 18:20

## 2023-01-01 RX ADMIN — Medication 4 MILLIGRAM(S): at 07:57

## 2023-01-01 RX ADMIN — BUMETANIDE 2 MILLIGRAM(S): 0.25 INJECTION INTRAMUSCULAR; INTRAVENOUS at 17:39

## 2023-01-01 RX ADMIN — Medication 1: at 17:10

## 2023-01-01 RX ADMIN — CHLORHEXIDINE GLUCONATE 15 MILLILITER(S): 213 SOLUTION TOPICAL at 05:18

## 2023-01-01 RX ADMIN — Medication 1: at 06:22

## 2023-01-01 RX ADMIN — Medication 0: at 05:26

## 2023-01-01 RX ADMIN — HEPARIN SODIUM 5000 UNIT(S): 5000 INJECTION INTRAVENOUS; SUBCUTANEOUS at 05:41

## 2023-01-01 RX ADMIN — ATORVASTATIN CALCIUM 80 MILLIGRAM(S): 80 TABLET, FILM COATED ORAL at 21:08

## 2023-01-01 RX ADMIN — Medication 3 MILLILITER(S): at 13:45

## 2023-01-01 RX ADMIN — MIDODRINE HYDROCHLORIDE 10 MILLIGRAM(S): 2.5 TABLET ORAL at 05:54

## 2023-01-01 RX ADMIN — LEVETIRACETAM 500 MILLIGRAM(S): 250 TABLET, FILM COATED ORAL at 05:05

## 2023-01-01 RX ADMIN — CEFTOLOZANE AND TAZOBACTAM 100 MILLIGRAM(S): 1; .5 INJECTION, POWDER, LYOPHILIZED, FOR SOLUTION INTRAVENOUS at 21:51

## 2023-01-01 RX ADMIN — HEPARIN SODIUM 5000 UNIT(S): 5000 INJECTION INTRAVENOUS; SUBCUTANEOUS at 06:14

## 2023-01-01 RX ADMIN — BUMETANIDE 2 MILLIGRAM(S): 0.25 INJECTION INTRAMUSCULAR; INTRAVENOUS at 05:47

## 2023-01-01 RX ADMIN — Medication 3 MILLIGRAM(S): at 05:24

## 2023-01-01 RX ADMIN — Medication 1 TABLET(S): at 11:45

## 2023-01-01 RX ADMIN — Medication 1 APPLICATION(S): at 06:00

## 2023-01-01 RX ADMIN — CHLORHEXIDINE GLUCONATE 15 MILLILITER(S): 213 SOLUTION TOPICAL at 18:43

## 2023-01-01 RX ADMIN — Medication 3 MILLILITER(S): at 20:04

## 2023-01-01 RX ADMIN — Medication 1 PACKET(S): at 05:00

## 2023-01-01 RX ADMIN — Medication 120 MILLIGRAM(S): at 06:02

## 2023-01-01 RX ADMIN — HEPARIN SODIUM 5000 UNIT(S): 5000 INJECTION INTRAVENOUS; SUBCUTANEOUS at 06:19

## 2023-01-01 RX ADMIN — PANTOPRAZOLE SODIUM 40 MILLIGRAM(S): 20 TABLET, DELAYED RELEASE ORAL at 06:14

## 2023-01-01 RX ADMIN — MIDODRINE HYDROCHLORIDE 10 MILLIGRAM(S): 2.5 TABLET ORAL at 05:32

## 2023-01-01 RX ADMIN — Medication 2: at 17:51

## 2023-01-01 RX ADMIN — LACOSAMIDE 100 MILLIGRAM(S): 50 TABLET ORAL at 18:03

## 2023-01-01 RX ADMIN — CHLORHEXIDINE GLUCONATE 1 APPLICATION(S): 213 SOLUTION TOPICAL at 05:14

## 2023-01-01 RX ADMIN — ATORVASTATIN CALCIUM 80 MILLIGRAM(S): 80 TABLET, FILM COATED ORAL at 21:46

## 2023-01-01 RX ADMIN — Medication 1: at 17:52

## 2023-01-01 RX ADMIN — LACTULOSE 20 GRAM(S): 10 SOLUTION ORAL at 05:33

## 2023-01-01 RX ADMIN — SODIUM ZIRCONIUM CYCLOSILICATE 10 GRAM(S): 10 POWDER, FOR SUSPENSION ORAL at 05:55

## 2023-01-01 RX ADMIN — CHLORHEXIDINE GLUCONATE 15 MILLILITER(S): 213 SOLUTION TOPICAL at 05:54

## 2023-01-01 RX ADMIN — Medication 4 MILLILITER(S): at 21:00

## 2023-01-01 RX ADMIN — Medication 81 MILLIGRAM(S): at 11:50

## 2023-01-01 RX ADMIN — BUMETANIDE 2 MILLIGRAM(S): 0.25 INJECTION INTRAMUSCULAR; INTRAVENOUS at 06:19

## 2023-01-01 RX ADMIN — BUMETANIDE 2 MILLIGRAM(S): 0.25 INJECTION INTRAMUSCULAR; INTRAVENOUS at 05:53

## 2023-01-01 RX ADMIN — AMIODARONE HYDROCHLORIDE 400 MILLIGRAM(S): 400 TABLET ORAL at 21:04

## 2023-01-01 RX ADMIN — Medication 50 MILLIGRAM(S): at 22:14

## 2023-01-01 RX ADMIN — TAMSULOSIN HYDROCHLORIDE 0.4 MILLIGRAM(S): 0.4 CAPSULE ORAL at 21:50

## 2023-01-01 RX ADMIN — ERGOCALCIFEROL 1000 UNIT(S): 1.25 CAPSULE ORAL at 11:43

## 2023-01-01 RX ADMIN — Medication 1 TABLET(S): at 11:33

## 2023-01-01 RX ADMIN — Medication 3 MILLILITER(S): at 09:26

## 2023-01-01 RX ADMIN — BUMETANIDE 2 MILLIGRAM(S): 0.25 INJECTION INTRAMUSCULAR; INTRAVENOUS at 05:31

## 2023-01-01 RX ADMIN — PANTOPRAZOLE SODIUM 40 MILLIGRAM(S): 20 TABLET, DELAYED RELEASE ORAL at 05:35

## 2023-01-01 RX ADMIN — Medication 1: at 17:39

## 2023-01-01 RX ADMIN — SODIUM ZIRCONIUM CYCLOSILICATE 10 GRAM(S): 10 POWDER, FOR SUSPENSION ORAL at 21:49

## 2023-01-01 RX ADMIN — PANTOPRAZOLE SODIUM 40 MILLIGRAM(S): 20 TABLET, DELAYED RELEASE ORAL at 05:36

## 2023-01-01 RX ADMIN — Medication 2: at 11:44

## 2023-01-01 RX ADMIN — Medication 0: at 22:35

## 2023-01-01 RX ADMIN — BUMETANIDE 2 MILLIGRAM(S): 0.25 INJECTION INTRAMUSCULAR; INTRAVENOUS at 05:32

## 2023-01-01 RX ADMIN — Medication 1: at 08:55

## 2023-01-01 RX ADMIN — Medication 1: at 06:08

## 2023-01-01 RX ADMIN — HEPARIN SODIUM 5000 UNIT(S): 5000 INJECTION INTRAVENOUS; SUBCUTANEOUS at 05:35

## 2023-01-01 RX ADMIN — Medication 500 MILLIGRAM(S): at 14:39

## 2023-01-01 RX ADMIN — SODIUM CHLORIDE 500 MILLILITER(S): 9 INJECTION, SOLUTION INTRAVENOUS at 04:19

## 2023-03-16 NOTE — PROGRESS NOTE ADULT - PROBLEM/PLAN-7
Pt arrives with friend who states he found the patient unresponsive on the floor. Pt arrives talking very low and slow. States her head hurts her chest hurts left arm feels numb and left leg feels weak. Pt states she doesn't remember what time this all started but it was dark. Level 2 stroke alert called in triage.
DISPLAY PLAN FREE TEXT

## 2023-03-17 NOTE — ED ADULT NURSE NOTE - TEMPLATE
Winlevi Pregnancy And Lactation Text: This medication is considered safe during pregnancy and breastfeeding. Dapsone Pregnancy And Lactation Text: This medication is Pregnancy Category C and is not considered safe during pregnancy or breast feeding. Minocycline Pregnancy And Lactation Text: This medication is Pregnancy Category D and not consider safe during pregnancy. It is also excreted in breast milk. Azelaic Acid Counseling: Patient counseled that medicine may cause skin irritation and to avoid applying near the eyes.  In the event of skin irritation, the patient was advised to reduce the amount of the drug applied or use it less frequently.   The patient verbalized understanding of the proper use and possible adverse effects of azelaic acid.  All of the patient's questions and concerns were addressed. Topical Sulfur Applications Pregnancy And Lactation Text: This medication is Pregnancy Category C and has an unknown safety profile during pregnancy. It is unknown if this topical medication is excreted in breast milk. Azithromycin Pregnancy And Lactation Text: This medication is considered safe during pregnancy and is also secreted in breast milk. Erythromycin Pregnancy And Lactation Text: This medication is Pregnancy Category B and is considered safe during pregnancy. It is also excreted in breast milk. Detail Level: Detailed Birth Control Pills Pregnancy And Lactation Text: This medication should be avoided if pregnant and for the first 30 days post-partum. Doxycycline Pregnancy And Lactation Text: This medication is Pregnancy Category D and not consider safe during pregnancy. It is also excreted in breast milk but is considered safe for shorter treatment courses. Topical Clindamycin Pregnancy And Lactation Text: This medication is Pregnancy Category B and is considered safe during pregnancy. It is unknown if it is excreted in breast milk. Topical Sulfur Applications Counseling: Topical Sulfur Counseling: Patient counseled that this medication may cause skin irritation or allergic reactions.  In the event of skin irritation, the patient was advised to reduce the amount of the drug applied or use it less frequently.   The patient verbalized understanding of the proper use and possible adverse effects of topical sulfur application.  All of the patient's questions and concerns were addressed. Topical Retinoid Pregnancy And Lactation Text: This medication is Pregnancy Category C. It is unknown if this medication is excreted in breast milk. Azelaic Acid Pregnancy And Lactation Text: This medication is considered safe during pregnancy and breast feeding. Minocycline Counseling: Patient advised regarding possible photosensitivity and discoloration of the teeth, skin, lips, tongue and gums.  Patient instructed to avoid sunlight, if possible.  When exposed to sunlight, patients should wear protective clothing, sunglasses, and sunscreen.  The patient was instructed to call the office immediately if the following severe adverse effects occur:  hearing changes, easy bruising/bleeding, severe headache, or vision changes.  The patient verbalized understanding of the proper use and possible adverse effects of minocycline.  All of the patient's questions and concerns were addressed. Birth Control Pills Counseling: Birth Control Pill Counseling: I discussed with the patient the potential side effects of OCPs including but not limited to increased risk of stroke, heart attack, thrombophlebitis, deep venous thrombosis, hepatic adenomas, breast changes, GI upset, headaches, and depression.  The patient verbalized understanding of the proper use and possible adverse effects of OCPs. All of the patient's questions and concerns were addressed. Bactrim Counseling:  I discussed with the patient the risks of sulfa antibiotics including but not limited to GI upset, allergic reaction, drug rash, diarrhea, dizziness, photosensitivity, and yeast infections.  Rarely, more serious reactions can occur including but not limited to aplastic anemia, agranulocytosis, methemoglobinemia, blood dyscrasias, liver or kidney failure, lung infiltrates or desquamative/blistering drug rashes. General High Dose Vitamin A Counseling: Side effects reviewed, pt to contact office should one occur. Bactrim Pregnancy And Lactation Text: This medication is Pregnancy Category D and is known to cause fetal risk.  It is also excreted in breast milk. Dapsone Counseling: I discussed with the patient the risks of dapsone including but not limited to hemolytic anemia, agranulocytosis, rashes, methemoglobinemia, kidney failure, peripheral neuropathy, headaches, GI upset, and liver toxicity.  Patients who start dapsone require monitoring including baseline LFTs and weekly CBCs for the first month, then every month thereafter.  The patient verbalized understanding of the proper use and possible adverse effects of dapsone.  All of the patient's questions and concerns were addressed. Doxycycline Counseling:  Patient counseled regarding possible photosensitivity and increased risk for sunburn.  Patient instructed to avoid sunlight, if possible.  When exposed to sunlight, patients should wear protective clothing, sunglasses, and sunscreen.  The patient was instructed to call the office immediately if the following severe adverse effects occur:  hearing changes, easy bruising/bleeding, severe headache, or vision changes.  The patient verbalized understanding of the proper use and possible adverse effects of doxycycline.  All of the patient's questions and concerns were addressed. Isotretinoin Counseling: Patient should get monthly blood tests, not donate blood, not drive at night if vision affected, not share medication, and not undergo elective surgery for 6 months after tx completed. Side effects reviewed, pt to contact office should one occur. Isotretinoin Pregnancy And Lactation Text: This medication is Pregnancy Category X and is considered extremely dangerous during pregnancy. It is unknown if it is excreted in breast milk. Tazorac Pregnancy And Lactation Text: This medication is not safe during pregnancy. It is unknown if this medication is excreted in breast milk. High Dose Vitamin A Pregnancy And Lactation Text: High dose vitamin A therapy is contraindicated during pregnancy and breast feeding. Aklief Pregnancy And Lactation Text: It is unknown if this medication is safe to use during pregnancy.  It is unknown if this medication is excreted in breast milk.  Breastfeeding women should use the topical cream on the smallest area of the skin for the shortest time needed while breastfeeding.  Do not apply to nipple and areola. Use Enhanced Medication Counseling?: No Spironolactone Counseling: Patient advised regarding risks of diarrhea, abdominal pain, hyperkalemia, birth defects (for female patients), liver toxicity and renal toxicity. The patient may need blood work to monitor liver and kidney function and potassium levels while on therapy. The patient verbalized understanding of the proper use and possible adverse effects of spironolactone.  All of the patient's questions and concerns were addressed. Aklief counseling:  Patient advised to apply a pea-sized amount only at bedtime and wait 30 minutes after washing their face before applying.  If too drying, patient may add a non-comedogenic moisturizer.  The most commonly reported side effects including irritation, redness, scaling, dryness, stinging, burning, itching, and increased risk of sunburn.  The patient verbalized understanding of the proper use and possible adverse effects of retinoids.  All of the patient's questions and concerns were addressed. Sarecycline Counseling: Patient advised regarding possible photosensitivity and discoloration of the teeth, skin, lips, tongue and gums.  Patient instructed to avoid sunlight, if possible.  When exposed to sunlight, patients should wear protective clothing, sunglasses, and sunscreen.  The patient was instructed to call the office immediately if the following severe adverse effects occur:  hearing changes, easy bruising/bleeding, severe headache, or vision changes.  The patient verbalized understanding of the proper use and possible adverse effects of sarecycline.  All of the patient's questions and concerns were addressed. Spironolactone Pregnancy And Lactation Text: This medication can cause feminization of the male fetus and should be avoided during pregnancy. The active metabolite is also found in breast milk. Topical Clindamycin Counseling: Patient counseled that this medication may cause skin irritation or allergic reactions.  In the event of skin irritation, the patient was advised to reduce the amount of the drug applied or use it less frequently.   The patient verbalized understanding of the proper use and possible adverse effects of clindamycin.  All of the patient's questions and concerns were addressed. Winlevi Counseling:  I discussed with the patient the risks of topical clascoterone including but not limited to erythema, scaling, itching, and stinging. Patient voiced their understanding. Azithromycin Counseling:  I discussed with the patient the risks of azithromycin including but not limited to GI upset, allergic reaction, drug rash, diarrhea, and yeast infections. Benzoyl Peroxide Pregnancy And Lactation Text: This medication is Pregnancy Category C. It is unknown if benzoyl peroxide is excreted in breast milk. Topical Retinoid counseling:  Patient advised to apply a pea-sized amount only at bedtime and wait 30 minutes after washing their face before applying.  If too drying, patient may add a non-comedogenic moisturizer. The patient verbalized understanding of the proper use and possible adverse effects of retinoids.  All of the patient's questions and concerns were addressed. Tetracycline Counseling: Patient counseled regarding possible photosensitivity and increased risk for sunburn.  Patient instructed to avoid sunlight, if possible.  When exposed to sunlight, patients should wear protective clothing, sunglasses, and sunscreen.  The patient was instructed to call the office immediately if the following severe adverse effects occur:  hearing changes, easy bruising/bleeding, severe headache, or vision changes.  The patient verbalized understanding of the proper use and possible adverse effects of tetracycline.  All of the patient's questions and concerns were addressed. Patient understands to avoid pregnancy while on therapy due to potential birth defects. Tazorac Counseling:  Patient advised that medication is irritating and drying.  Patient may need to apply sparingly and wash off after an hour before eventually leaving it on overnight.  The patient verbalized understanding of the proper use and possible adverse effects of tazorac.  All of the patient's questions and concerns were addressed. Erythromycin Counseling:  I discussed with the patient the risks of erythromycin including but not limited to GI upset, allergic reaction, drug rash, diarrhea, increase in liver enzymes, and yeast infections. Benzoyl Peroxide Counseling: Patient counseled that medicine may cause skin irritation and bleach clothing.  In the event of skin irritation, the patient was advised to reduce the amount of the drug applied or use it less frequently.   The patient verbalized understanding of the proper use and possible adverse effects of benzoyl peroxide.  All of the patient's questions and concerns were addressed.

## 2023-04-29 NOTE — PROGRESS NOTE ADULT - PROBLEM SELECTOR PLAN 6
right soleal vein DVT and persistent left posterior tibial, peroneal, gastrocnemius and soleal vein DVT without proximal propagation on last duplex on 11/24  - started on Eliquis 12/2--> monitor for bleed Poor

## 2023-05-25 NOTE — ED PROVIDER NOTE - PHYSICAL EXAMINATION
Positive
CONSTITUTIONAL:  nonverbal, opens eyes spontaneously and to loud voice/touch, occasionally makes eye contact but does not track or follow commands or move volitionally; NAD, nontoxic, resting comfortably in bed trach/peg in place  SKIN:  warm, dry; no diaphoresis/icterus  HEAD:  NCAT;   EYES:  NL inspection;   ENT: trach in place  NECK: supple, no deformity  CARD:  RRR; distal pulses 2+ x4 extremities  RESP:  trach connected to vent (400 tidal volume, 15 RR, PEEP 5, fiO2 40% at time of initial evaluation w/ O2 sat on pulse ox %); lungs CTAB  ABD:  + PEG in place, site clean w/o erythema/drainage; abd S/ND/NT, no R/G;   MSK:  + chronic contractures x4 extremities, no apparent acute extremity injury/deformity;   NEURO:  nonverbal, opens eyes spontaneously, briefly makes eye contacts but does not track, does not move extremities voluntarily, does not follow commands; brainstem reflexes intact,

## 2023-05-30 NOTE — H&P ADULT - HISTORY OF PRESENT ILLNESS
63 yo M with PMH of ICH, status post trach and PEG, hypertension, hyperlipidemia, CAD presents to the ED via EMS from [facility] for abnormal lab.  Patient found to have a BUN greater than 200 and creatinine of 4.3 on outpatient labs. Outpatient CXR also w/ new L sided pleural effusion. With EMS patient found to be in irregular rhythm, no known history of A-fib or a flutter.       62 yo M with  PMH of ICH (2021) s/p trach and PEG, HTN, HLD, T2DM, CAD s/p stents, Recurrent C diff, BIBEMS from [facility] for abnormal labs. Patient non-verbal at baseline - limited history. Per NH chart  have a BUN greater than 200 and creatinine of 4.3 on outpatient labs. Outpatient CXR also w/ new L sided pleural effusion. With EMS patient was also found to be in irregular rhythm, no known history of A-fib or a flutter.  In ED patient had borderline BP, and was in Atrial fibrillation. He was started on amiodarone gtt. Labs showed , Cr. 4.1, Hb 6.6. Trops 1.8. He was given 1U PRBC, gastric lavage revealed coffee ground emesis w/no active bleeding. He was started on PPI drip and GI consulted in ED. He was given IV aztreonam and vancomycin.   Patient being admitted to ICU for management of Sepsis likely from UTI, MICHELLE likely prerenal and NSTEMI.

## 2023-05-30 NOTE — ED ADULT NURSE NOTE - NSFALLRISKFACTORS_ED_ALL_ED
[facility]    PATIENT'S NAME: Camacho Yingalexander MEEKS   ATTENDING PHYSICIAN: Eveline Azar M.D. OPERATING PHYSICIAN: Eveline Azar M.D.    PATIENT ACCOUNT#:   [de-identified]    LOCATION:  [address] [place] [address]  MEDICAL RECORD #:   [id number]       DATE OF BIRTH:  [birth year] [facility] [id number] Coagulation: Bleeding disorder either through use of anticoagulants or underlying clinical condition(s)/Surgery: Recent surgery, recent lower limb amputation, major abdominal or thoracic surgery

## 2023-05-30 NOTE — H&P ADULT - NSICDXPASTMEDICALHX_GEN_ALL_CORE_FT
PAST MEDICAL HISTORY:  Diabetes mellitus     H/O intracranial hemorrhage     H/O tracheostomy     HTN (hypertension)     Hyperlipidemia     PEG (percutaneous endoscopic gastrostomy) status

## 2023-05-30 NOTE — ED PROVIDER NOTE - PROGRESS NOTE DETAILS
JS: Patient found to be in new onset afib on arrival. Rate controlled with amiodarone. BUN and creatine found to be markedly elevated as well as hbg of 6.6. Consent obtained from wife Sandrita for 1 unit of prbcs. Will consent nephro, place central line and admit to ICU. JS: Patient found to be in new onset afib on arrival. Rate controlled with amiodarone. BUN and creatine found to be markedly elevated as well as hbg of 6.6. Barney catheter placed.  Consent obtained from wife Sandrita for 1 unit of prbcs. Will consent nephro, place central line and admit to ICU. JS: Gastric lavage performed revealing coffee ground emesis w/ no active bleeding. Will start PPI drip and consult GI.

## 2023-05-30 NOTE — CHART NOTE - NSCHARTNOTEFT_GEN_A_CORE
IMPRESSION:    Sepsis on admission  UTI  MICHELLE, likely prerenal  NSTEMI likely type 2  Hyponatremia  Acute on chronic anemia  Chronic respiratory failure  H/o ICH s/p trach and PEG  H/o CAD      PLAN:    CNS: Continue home meds. Sedate to synchrony with ventilator    HEENT: Oral and trach care    PULMONARY: Obtain ABG and adjust vent settings. Goal saturation 92-95%. HOB @ 45 degrees.    CARDIOVASCULAR: Avoid overload. Obtain echo. Trend troponin. Cardiology consult.    GI: NPO for now. Consider GI consult in AM.    RENAL: ED ordered 1 unit pRBC; this should increase the oncotic pressure and the blood pressure. Obtain urine Na, osm, and Cr. Nephrology consult. Renal/bladder ultrasound. Follow up lytes. Correct as needed    INFECTIOUS DISEASE: Follow up blood and urine cultures. Vancomycin and cefepime for now, renally dosed.    HEMATOLOGICAL: Transfuse 1 unit, this should help increase the oncotic pressure. DVT prophylaxis. Repeat CBC in AM    ENDOCRINE: Follow up FS. Insulin protocol if needed.    MUSCULOSKELETAL: Bedrest

## 2023-05-30 NOTE — H&P ADULT - NSHPLABSRESULTS_GEN_ALL_CORE
( @ 20:08)                      6.6  13.22 )-----------( 438                 20.5    Neutrophils = 11.75 (88.9%)  Lymphocytes = 0.83 (6.3%)  Eosinophils = 0.00 (0.0%)  Basophils = 0.00 (0.0%)  Monocytes = 0.57 (4.3%)  Bands = --%        119<LL>  |  78<L>  |  235<HH>  ----------------------------<  313<H>  3.4<L>   |  14<L>  |  4.1<HH>    Ca    8.0<L>      30 May 2023 20:08    TPro  5.7<L>  /  Alb  1.9<L>  /  TBili  0.4  /  DBili  x   /  AST  12  /  ALT  <5  /  AlkPhos  199<H>        ( 30 May 2023 20:08 )   PT: 14.10 sec;   INR: 1.23 ratio;       PTT:32.3 sec  CARDIAC MARKERS ( 30 May 2023 20:08 )  Trop 1.80 ng/mL<HH> / CK x     / CKMB x           Urinalysis Basic - ( 30 May 2023 21:15 )  Color: Jazmine / Appearance: Turbid / S.023 / pH: x  Gluc: x / Ketone: Trace  / Bili: Negative / Urobili: 6 mg/dL   Blood: x / Protein: 100 mg/dL / Nitrite: Negative   Leuk Esterase: Large / RBC: 355 /HPF /  /HPF   Sq Epi: x / Non Sq Epi: x / Bacteria: Negative

## 2023-05-30 NOTE — H&P ADULT - NSHPPHYSICALEXAM_GEN_ALL_CORE
Vitals (Last 24 Hrs):  T(C): 35.9 (05-30-23 @ 20:30), Max: 35.9 (05-30-23 @ 20:30)  T(F): 96.6 (05-30-23 @ 20:30), Max: 96.6 (05-30-23 @ 20:30)  HR: 90 (05-30-23 @ 20:30) (90 - 147)  BP: 85/53 (05-30-23 @ 20:30) (85/53 - 96/55)  RR: 14 (05-30-23 @ 20:30) (14 - 20)  SpO2: 100% (05-30-23 @ 20:30) (99% - 100%) Vitals (Last 24 Hrs):  T(C): 35.9 (05-30-23 @ 20:30), Max: 35.9 (05-30-23 @ 20:30)  T(F): 96.6 (05-30-23 @ 20:30), Max: 96.6 (05-30-23 @ 20:30)  HR: 90 (05-30-23 @ 20:30) (90 - 147)  BP: 85/53 (05-30-23 @ 20:30) (85/53 - 96/55)  RR: 14 (05-30-23 @ 20:30) (14 - 20)  SpO2: 100% (05-30-23 @ 20:30) (99% - 100%)    General: Chronically ill looking; Pallor (-), Icterus (-), Cyanosis (-), Clubbing (-)  HEENT: Normocephalic, atraumatic, PERRLA  PULM: bilateral coarse breath sounds, wheeze (-), rubs (-), crackles (+)  CVS: Normal S1 and S2, murmurs (-), rubs (-), gallops (-)  GI: Soft, abdominal wall Edema +, BS- sluggish,  MSK: Edema (++), no muscle, bone or joint tenderness noted  SKIN: Warm and well perfused, no rashes noted  NEURO: Alert, non-verbal, bilateral upper extremities contracted.

## 2023-05-30 NOTE — ED PROVIDER NOTE - CRITICAL CARE ATTENDING CONTRIBUTION TO CARE
I agree with all PA documentation and performed a substantiative amount of HPI, ROS, and Exam.  I personally evaluated patient. I agree with the findings and plan with all documentation on chart except as documented  in my note.    63-year-old male past medical history of prior intracranial hemorrhage, bedbound status post trach and PEG, hypertension, dyslipidemia, CAD who presents from nursing home for abnormal labs and imaging.  Patient from [place] and outpatient chest x-ray and labs done from today and show a BUN of 200 and a creatinine above 4.  Patient had a chest x-ray that also showed left pleural effusion which is new.  Patient was picked up by EMS and was hypotensive and an irregularly irregular rhythm.  Patient rushed into critical care emergency department.    Patient was immediately placed on a monitor and EKG performed.  Patient in A-fib with RVR.  Patient had periods of hypotension to 70/30 and pads were placed on patient.  Large-bore IV access obtained and full labs sent.  Given outpatient kidney failure, patient had Barney placed.  Patient scrotum and penile shaft are both extremely edematous.  Patient clinically has generalized anasarca.  Patient had 150 cc of tea colored urine that was cloudy.  Patient started on fluid resuscitation and broad-spectrum antibiotics.  Patient was rolled and has a deep and large decubitus ulcer) was consulted.  Patient was given push dose pressors and was started on amiodarone drip was converting in and out of sinus rhythm.  After drip was started, patient consistently in sinus rhythm.  Patient given antibiotics to cover for possible MRSA infection and UTI, skin infection.  Patient extremely difficult access.  Given BUN/creatinine ratio and hemoglobin at 6.6, gastric lavage done through PEG tube.  This showed coffee ground emesis with no bright red blood.  Patient given PPI bolus and started on a drip and GI consulted for care.  ICU consulted for care.  Patient required emergent central line placement and given tracheostomy, right subclavian ultrasound-guided central line placed.    ICU evaluated patient at bedside will admit to the ICU.  Patient's wife spoken to in detail about results and plan of care, need for blood transfusion.  Blood transfusion ordered and consent filled and patient admitted to ICU.

## 2023-05-30 NOTE — H&P ADULT - ASSESSMENT
IMPRESSION:  Sepsis on admission  UTI  MICHELLE, likely prerenal  NSTEMI likely type 2  Hyponatremia  Paroxysmal Afib  Acute on chronic anemia suspected UGI Bleed  Chronic respiratory failure  H/o ICH s/p trach and PEG  H/o CAD      PLAN:    CNS: Continue home meds. Sedate to synchrony with ventilator    HEENT: Oral and trach care    PULMONARY: Obtain ABG and adjust vent settings. Goal saturation 92-95%. HOB @ 45 degrees.    CARDIOVASCULAR: Avoid overload. Obtain echo. Trend troponin. Cardiology consult.    GI: NPO for now. Consider GI consult in AM.    RENAL: ED ordered 1 unit pRBC; this should increase the oncotic pressure and the blood pressure. Obtain urine Na, osm, and Cr. Nephrology consult. Renal/bladder ultrasound. Follow up lytes. Correct as needed    INFECTIOUS DISEASE: Follow up blood and urine cultures. Vancomycin and cefepime for now, renally dosed.    HEMATOLOGICAL: Transfuse 1 unit, this should help increase the oncotic pressure. DVT prophylaxis. Repeat CBC in AM    ENDOCRINE: Follow up FS. Insulin protocol if needed.    MUSCULOSKELETAL: Bedrest.    CODE STATUS: DNR    DISPO: MICU IMPRESSION:    Sepsis on admission  UTI  MICHELLE,   NSTEMI likely type 2  Hyponatremia  Paroxysmal Afib  Acute on chronic anemia suspected UGI Bleed  Chronic respiratory failure  H/o ICH s/p trach and PEG  H/o CAD      PLAN:    CNS: Continue home meds. Sedate to synchrony with ventilator as needed    HEENT: Oral and trach care    PULMONARY: Obtain ABG and adjust vent settings. Goal saturation 92-95%. HOB @ 45 degrees. monitor P/P/ DP    CARDIOVASCULAR: Avoid overload. Obtain echo. Trend troponin. Cardiology consult.    GI: NPO for now. Consider GI consult in AM.    RENAL: ED ordered 1 unit pRBC; this should increase the oncotic pressure and the blood pressure. Obtain urine Na, osm, and Cr. Nephrology consult. Renal/bladder ultrasound. Follow up lytes. Correct as needed    INFECTIOUS DISEASE: Follow up blood and urine cultures. Vancomycin and cefepime for now, renally dosed.    HEMATOLOGICAL: Transfuse 1 unit, this should help increase the oncotic pressure. DVT prophylaxis. Repeat CBC in AM    ENDOCRINE: Follow up FS. Insulin protocol if needed.    MUSCULOSKELETAL: Bedrest.    CODE STATUS: DNR    DISPO: MICU

## 2023-05-30 NOTE — ED PROVIDER NOTE - PHYSICAL EXAMINATION
CONST: Well appearing in NAD  EYES: PERRL, EOMI, Sclera and conjunctiva clear.   ENT: Oropharynx edentulous, no erythema or exudates. Uvula midline.  CARD: Normal S1 S2; Tachycardic, irregular rhythm.   RESP: Equal BS B/L, No wheezes, rhonchi or rales. No distress  GI: Abd soft, distended, non tender. PEG tube in place.   : 2+ scrotal and penile edema  SKIN: Diffuse anasarca.  8 cm stage 4 sacral ulcer.   NEURO: Awake, trach collar in place. CONST: Well appearing in NAD  EYES: PERRL, EOMI, Sclera and conjunctiva clear.   ENT: Oropharynx edentulous, no erythema or exudates. Uvula midline.  CARD: Normal S1 S2; Tachycardic, irregular rhythm.   RESP: Equal BS B/L. No distress  GI: Abd soft, distended, non tender. PEG tube in place.   : 2+ scrotal and penile edema  SKIN: Diffuse anasarca.  8 cm stage 4 sacral ulcer.   NEURO: Awake, trach collar in place.

## 2023-05-30 NOTE — H&P ADULT - ATTENDING COMMENTS
Events noted, presented with abnormal labs/ hx of ICH/ sp trach/ PEG, MICHELLE, hyponatremia, fluid overload, Anemia, A fib  Uosm/ Na, renal us, renal fup, diuresis as tolerated ( hypervolemic) trend CMP  Protonix q 12, trend CBC  ABX, pancx  LE doppler  echo  palliative care eval  very poor prongosis

## 2023-05-30 NOTE — ED ADULT NURSE NOTE - NSFALLHARMRISKINTERV_ED_ALL_ED

## 2023-05-30 NOTE — ED PROVIDER NOTE - OBJECTIVE STATEMENT
62-year-old male with a past medical history of ICH, status post trach and PEG, hypertension, hyperlipidemia, CAD presents to the ED via EMS from [facility] for abnormal lab.  Patient found to have a BUN greater than 200 on outpatient labs.  With EMS patient found to be in irregular rhythm, no known history of A-fib or a flutter. 62-year-old male with a past medical history of ICH, status post trach and PEG, hypertension, hyperlipidemia, CAD presents to the ED via EMS from [facility] for abnormal lab.  Patient found to have a BUN greater than 200 and creatinine of 4.3 on outpatient labs. Outpatient CXR also w/ new L sided pleural effusion. With EMS patient found to be in irregular rhythm, no known history of A-fib or a flutter. 63-year-old male with a past medical history of ICH, status post trach and PEG, hypertension, hyperlipidemia, CAD presents to the ED via EMS from [facility] for abnormal lab.  Patient found to have a BUN greater than 200 and creatinine of 4.3 on outpatient labs. Outpatient CXR also w/ new L sided pleural effusion. With EMS patient found to be in irregular rhythm, no known history of A-fib or a flutter.

## 2023-05-30 NOTE — ED PROVIDER NOTE - CARE PLAN
1 Principal Discharge DX:	MICHELLE (acute kidney injury)  Secondary Diagnosis:	Acute UTI   Principal Discharge DX:	MICHELLE (acute kidney injury)  Secondary Diagnosis:	Acute UTI  Secondary Diagnosis:	Atrial flutter with rapid ventricular response  Secondary Diagnosis:	Septic shock

## 2023-05-30 NOTE — ED PROVIDER NOTE - CLINICAL SUMMARY MEDICAL DECISION MAKING FREE TEXT BOX
63-year-old male past medical history of prior intracranial hemorrhage, bedbound status post trach and PEG, hypertension, dyslipidemia, CAD who presents from nursing home for abnormal labs and imaging.  Patient from [facility] [facility] and outpatient chest x-ray and labs done from today and show a BUN of 200 and a creatinine above 4.  Patient had a chest x-ray that also showed left pleural effusion which is new.  Patient was picked up by EMS and was hypotensive and an irregularly irregular rhythm.  Patient rushed into critical care emergency department.    Patient was immediately placed on a monitor and EKG performed.  Patient in A-fib with RVR.  Patient had periods of hypotension to 70/30 and pads were placed on patient.  Large-bore IV access obtained and full labs sent.  Given outpatient kidney failure, patient had Barney placed.  Patient scrotum and penile shaft are both extremely edematous.  Patient clinically has generalized anasarca.  Patient had 150 cc of tea colored urine that was cloudy.  Patient started on fluid resuscitation and broad-spectrum antibiotics.  Patient was rolled and has a deep and large decubitus ulcer) was consulted.  Patient was given push dose pressors and was started on amiodarone drip was converting in and out of sinus rhythm.  After drip was started, patient consistently in sinus rhythm.  Patient given antibiotics to cover for possible MRSA infection and UTI, skin infection.  Patient extremely difficult access.  Given BUN/creatinine ratio and hemoglobin at 6.6, gastric lavage done through PEG tube.  This showed coffee ground emesis with no bright red blood.  Patient given PPI bolus and started on a drip and GI consulted for care.  ICU consulted for care.  Patient required emergent central line placement and given tracheostomy, right subclavian ultrasound-guided central line placed.    ICU evaluated patient at bedside will admit to the ICU.  Patient's wife spoken to in detail about results and plan of care, need for blood transfusion.  Blood transfusion ordered and consent filled and patient admitted to ICU.

## 2023-05-30 NOTE — ED ADULT NURSE NOTE - SEPSIS REFERENCE DATA CRITERIA 2
From: Amanda Felton  To: Radha Villasenor  Sent: 12/16/2022 8:36 AM CST  Subject: Pink eye    Good morning   I woke up this morning and saw my red eye. Is this pink eye? What do I do? I have some crusty and my eye is draining. No itching or burning.   Thanks  Mindy  
Please triage.     Routed to nurse pool.   
Abnormal Lactate: > 2

## 2023-05-30 NOTE — ED PROVIDER NOTE - ADMIT DISPOSITION PRESENT ON ADMISSION SEPSIS Q1 - RE-EVALUATED PATIENT FLUID AND VITAL SIGNS
Patient was in [facility] [address] but easily awoken and respond to voice. Denies thoughts of harm to self or others. Denies hearing voices or seeing things not there. Was calm and cooperative. Patient spent the whole evening in room sleeping off and on. Refused to take ordered medications. Will continue to monitor. I have re-evaluated the patient's fluid status and reviewed vital signs. Clinical perfusion assessment was performed.

## 2023-05-31 NOTE — ED ADULT NURSE REASSESSMENT NOTE - NS ED NURSE REASSESS COMMENT FT1
Contacted MD Mckinley via teams in regards to medication verification. As per RN, MD was in a procedure, awaiting answer.

## 2023-05-31 NOTE — CONSULT NOTE ADULT - ASSESSMENT
64 yo M with  PMH of ICH (2021) s/p trach and PEG, HTN, HLD, T2DM, CAD s/p stents, Recurrent C diff, BIBEMS from [facility] for abnormal labs. Patient non-verbal at baseline - limited history. Per NH chart  have a BUN greater than 200 and creatinine of 4.3 on outpatient labs. GI is consulted for evaluation of coffee ground emesis and anemia.    # Reported coffee ground emesis:  - ptient is hemodinamically stable  - Hb 6.6 from baseline of 7-8  - PEG tube flushed with return of dark bile liquid  - had brown BM today  - no signs of overt gi bleeding    recommendations:  - tube feeds per primary team  - PPI BID  - management of anticoagulation/antipletlets per primary team (no contraindications from GI stand point given no signs of overt GI bleeding)- avoid NSAID  - transfuse as needed  - monitor H/H  - supportive measure per ICU team

## 2023-05-31 NOTE — CONSULT NOTE ADULT - ASSESSMENT
63 year old man with history of ICH s/p trach and PEG in 2021, CAD, recurrent CDIff brought in for elevated BUN/creatinine. Palliative care consulted for GOC.     Spoke with Yahir over the phone. Palliative care introduced. She was able to provide a medical update and hospital course. Discussed the patient functional and cognitive status. She noted that he's usually awake and alert. She noted he's been less alert and aware over the last few weeks.    She noted that she spoke with the primary team and renal teams, and the plan is to possibly initiate dialysis in the next 24 hours.  We discussed overall GOC. She noted the patient is DNR. We discussed the patient's comorbidities and overall poor prognosis, and noted that dialysis may be needed indefinitely.  She noted that she'd liked to try dialysis if it won't hurt him. All questions answered.    MEDD (morphine equivalent daily dose):    Education about palliative care provided to patient/family.  See Recs below.    Please call [phone] with questions or concerns 24/7.   We will continue to follow.

## 2023-05-31 NOTE — ED PROCEDURE NOTE - NS ED ATTENDING STATEMENT MOD
This was a shared visit with the DONNA. I reviewed and verified the documentation and independently performed the documented:
This was a shared visit with the DONNA. I reviewed and verified the documentation and independently performed the documented:

## 2023-05-31 NOTE — CONSULT NOTE ADULT - ATTENDING COMMENTS
agree w/ above - pt w/ chronic anemia and Hb baseline of around 7-8.   no evidence of active bleeding on exam.   no plan for inpatient intervention from GI standpoint.  rest of recs per above note.
Patent seen / examined and plan amended above.    Medical management of NSTEMI/CAD  IV diuresis and supportive care  Palliative care consult

## 2023-05-31 NOTE — CONSULT NOTE ADULT - SUBJECTIVE AND OBJECTIVE BOX
Outpt cardiologist:    Reason for Consult:     HISTORY OF PRESENT ILLNESS:  62 yo M with  PMH of ICH () s/p trach and PEG, HTN, HLD, T2DM, CAD s/p stents, Recurrent C diff, BIBEMS from [facility] for abnormal labs. Patient non-verbal at baseline - limited history. Per NH chart  have a BUN greater than 200 and creatinine of 4.3 on outpatient labs. Outpatient CXR also w/ new L sided pleural effusion. With EMS patient was also found to be in irregular rhythm, no known history of A-fib or a flutter.  In ED patient had borderline BP, and was in Atrial fibrillation. He was started on amiodarone gtt. Labs showed , Cr. 4.1, Hb 6.6. Trops 1.8. He was given 1U PRBC, gastric lavage revealed coffee ground emesis w/no active bleeding. He was started on PPI drip and GI consulted in ED. He was given IV aztreonam and vancomycin.   Patient being admitted to ICU for management of Sepsis likely from UTI, MICHELLE likely prerenal and NSTEMI.        (30 May 2023 23:39)      PAST MEDICAL & SURGICAL HISTORY  HTN (hypertension)    Diabetes mellitus    H/O intracranial hemorrhage    H/O tracheostomy    PEG (percutaneous endoscopic gastrostomy) status    Hyperlipidemia        FAMILY HISTORY:  FAMILY HISTORY:      SOCIAL HISTORY:  Social History:      ALLERGIES:  penicillin (Other)      MEDICATIONS:  albuterol/ipratropium for Nebulization 3 milliLiter(s) Nebulizer every 6 hours  aMIOdarone Infusion 0.5 mG/Min (16.7 mL/Hr) IV Continuous <Continuous>  aMIOdarone Infusion 1 mG/Min (33.3 mL/Hr) IV Continuous <Continuous>  ascorbic acid 500 milliGRAM(s) Oral daily  aspirin  chewable 81 milliGRAM(s) Oral daily  atorvastatin 80 milliGRAM(s) Oral at bedtime  cefepime   IVPB 1000 milliGRAM(s) IV Intermittent every 12 hours  chlorhexidine 0.12% Liquid 15 milliLiter(s) Oral Mucosa two times a day  dextrose 5%. 1000 milliLiter(s) (100 mL/Hr) IV Continuous <Continuous>  dextrose 5%. 1000 milliLiter(s) (50 mL/Hr) IV Continuous <Continuous>  dextrose 50% Injectable 25 Gram(s) IV Push once  dextrose 50% Injectable 25 Gram(s) IV Push once  dextrose 50% Injectable 12.5 Gram(s) IV Push once  ferrous    sulfate Liquid 300 milliGRAM(s) Enteral Tube daily  folic acid 1 milliGRAM(s) Oral daily  glucagon  Injectable 1 milliGRAM(s) IntraMuscular once  heparin   Injectable 5000 Unit(s) SubCutaneous every 12 hours  insulin lispro (ADMELOG) corrective regimen sliding scale   SubCutaneous three times a day before meals  levETIRAcetam  Solution 500 milliGRAM(s) Oral two times a day  midodrine. 5 milliGRAM(s) Oral three times a day  multivitamin 1 Tablet(s) Oral daily  pantoprazole  Injectable 40 milliGRAM(s) IV Push every 12 hours  silver sulfADIAZINE 1% Cream 1 Application(s) Topical every 12 hours  tamsulosin 0.4 milliGRAM(s) Oral at bedtime  zinc sulfate 220 milliGRAM(s) Oral daily    PRN:  acetaminophen     Tablet .. 650 milliGRAM(s) Oral every 6 hours PRN  dextrose Oral Gel 15 Gram(s) Oral once PRN      HOME MEDICATIONS:  Home Medications:  albuterol 2.5 mg/3 mL (0.083%) inhalation solution: 1 unit(s) inhaled every 4 hours, As Needed (04 Oct 2022 11:00)  ascorbic acid 500 mg oral tablet: 1 tab(s) orally once a day (31 May 2023 01:05)  aspirin 81 mg oral tablet, chewable: 1 tab(s) orally once a day (04 Oct 2022 11:00)  atorvastatin 80 mg oral tablet: 1 tab(s) orally once a day (04 Oct 2022 11:00)  Atrovent 18 mcg/inh inhalation aerosol: 1 unit(s) inhaled every 6 hours (04 Oct 2022 11:00)  Bactrim  mg-160 mg oral tablet: 1 tablet with sensor orally every 12 hours (31 May 2023 01:17)  chlorhexidine 0.12% mucous membrane liquid: 15 milliliter(s) buccal 2 times a day (31 May 2023 00:59)  doxazosin 4 mg oral tablet: 1 tab(s) orally once a day (at bedtime) (31 May 2023 01:00)  epoetin guanakito 40,000 units/mL injectable solution: 1 milliliter(s) injectable once a week (31 May 2023 01:04)  ergocalciferol 200 mcg/mL (8000 intl units/mL) oral solution: 0.25 milliliter(s) orally once a day (04 Oct 2022 11:00)  famotidine 10 mg oral tablet: 1 tab(s) orally 2 times a day (04 Oct 2022 11:00)  ferrous sulfate 220 mg/5 mL (44 mg/5 mL elemental iron) oral elixir: 7.5 milliliter(s) by gastrostomy tube once a day (04 Oct 2022 11:00)  folic acid 1 mg oral tablet: 1 tab(s) orally once a day (04 Oct 2022 11:00)  furosemide 40 mg oral tablet: 1 tab(s) orally once a day (31 May 2023 01:16)  imipenem-cilastatin 500 mg injection: 500 milligram(s) injectable every 6 hours (31 May 2023 01:17)  insulin lispro 100 units/mL injectable solution: injectable Sliding scale (31 May 2023 01:17)  Keppra 100 mg/mL oral solution: 5 milliliter(s) by gastrostomy tube 2 times a day (04 Oct 2022 11:00)  midodrine 5 mg oral tablet: 1 tab(s) orally 3 times a day (04 Oct 2022 11:00)  Multiple Vitamins oral tablet: 1 tab(s) orally once a day via PEG (04 Oct 2022 11:00)  SSD 1% topical cream: Apply topically to affected area every 12 hours (31 May 2023 01:17)  tamsulosin 0.4 mg oral capsule: 1 cap(s) orally once a day (at bedtime) (13 Oct 2022 10:24)  Tylenol 325 mg oral tablet: 2 tab(s) orally once a day (04 Oct 2022 11:00)  zinc sulfate 220 mg oral tablet: 1 tab(s) orally once a day (31 May 2023 01:16)      VITALS:   T(F): 96.2 ( @ 08:35), Max: 96.6 ( @ 20:30)  HR: 86 ( @ 08:35) (80 - 147)  BP: 95/54 ( @ 08:35) (85/53 - 97/54)  BP(mean): 65 ( @ 20:30) (65 - 68)  RR: 24 ( @ 08:35) (14 - 24)  SpO2: 100% ( @ 08:35) (99% - 100%)    I&O's Summary    30 May 2023 07:01  -  31 May 2023 07:00  --------------------------------------------------------  IN: 0 mL / OUT: 50 mL / NET: -50 mL        REVIEW OF SYSTEMS:  N/A    PHYSICAL EXAM:  General: Not in distress.  Non-toxic appearing.   Cardio: regular, S1, S2, no murmur  Pulm: B/L BS.  No wheezing / crackles / rales  Abdomen: Soft, non-tender, non-distended. Normoactive bowel sounds  Extremities: 2+ edema b/l le  Neuro: A&O x0. COntracted UEs.     LABS:                        6.7    9.00  )-----------( 370      ( 31 May 2023 05:50 )             20.3         120<L>  |  82<L>  |  233<HH>  ----------------------------<  333<H>  3.3<L>   |  13<L>  |  4.1<HH>    Ca    7.5<L>      31 May 2023 05:50  Mg     2.8         TPro  4.8<L>  /  Alb  1.7<L>  /  TBili  0.4  /  DBili  x   /  AST  10  /  ALT  <5  /  AlkPhos  163<H>      PT/INR - ( 30 May 2023 20:08 )   PT: 14.10 sec;   INR: 1.23 ratio         PTT - ( 30 May 2023 20:08 )  PTT:32.3 sec  Troponin T, Serum: 1.57 ng/mL *HH* (23 @ 05:50)  Lactate, Blood: 3.7 mmol/L *H* (23 @ 04:35)  Troponin T, Serum: 1.63 ng/mL *HH* (23 @ 03:05)  Troponin T, Serum: 1.80 ng/mL *HH* (23 @ 20:08)    CARDIAC MARKERS ( 31 May 2023 05:50 )  x     / 1.57 ng/mL / x     / x     / x      CARDIAC MARKERS ( 31 May 2023 03:05 )  x     / 1.63 ng/mL / x     / x     / x      CARDIAC MARKERS ( 30 May 2023 20:08 )  x     / 1.80 ng/mL / x     / x     / x            Troponin trend:      EC Lead ECG:   Ventricular Rate 143 BPM    Atrial Rate 288 BPM    QRS Duration 90 ms    Q-T Interval 282 ms    QTC Calculation(Bazett) 435 ms    R Axis 11 degrees    T Axis -40 degrees    Diagnosis Line Atrial flutter with variable A-V block  Minimal voltage criteria for LVH, may be normal variant ( R in aVL )  Inferior infarct , age undetermined  Abnormal ECG    Confirmed by Jonathan Barcenas ([id number]) on 2023 8:59:27 AM ( @ 18:59)       HISTORY OF PRESENT ILLNESS:  62 yo M with  PMH of ICH () s/p trach and PEG, HTN, HLD, T2DM, CAD s/p stents, Recurrent C. diff, BIBEMS from [facility] for abnormal labs. Patient non-verbal at baseline - limited history. Per NH chart  have a BUN greater than 200 and creatinine of 4.3 on outpatient labs. Outpatient CXR also w/ new L sided pleural effusion. With EMS patient was also found to be in irregular rhythm, no known history of A-fib or a flutter.  In ED patient had borderline BP, and was in Atrial fibrillation. He was started on amiodarone gtt. Labs showed , Cr. 4.1, Hb 6.6. Trops 1.8. He was given 1U PRBC, gastric lavage revealed coffee ground emesis w/no active bleeding. He was started on PPI drip and GI consulted in ED. He was given IV aztreonam and vancomycin.     Patient being admitted to ICU for management of Sepsis likely from UTI, MICHELLE likely prerenal and NSTEMI.       PAST MEDICAL & SURGICAL HISTORY  HTN (hypertension)    Diabetes mellitus    H/O intracranial hemorrhage    H/O tracheostomy    PEG (percutaneous endoscopic gastrostomy) status    Hyperlipidemia        FAMILY HISTORY:  Unable to obtain    SOCIAL HISTORY:  Unable to obtain    ALLERGIES:  penicillin (Other)      MEDICATIONS:  albuterol/ipratropium for Nebulization 3 milliLiter(s) Nebulizer every 6 hours  aMIOdarone Infusion 0.5 mG/Min (16.7 mL/Hr) IV Continuous <Continuous>  aMIOdarone Infusion 1 mG/Min (33.3 mL/Hr) IV Continuous <Continuous>  ascorbic acid 500 milliGRAM(s) Oral daily  aspirin  chewable 81 milliGRAM(s) Oral daily  atorvastatin 80 milliGRAM(s) Oral at bedtime  cefepime   IVPB 1000 milliGRAM(s) IV Intermittent every 12 hours  chlorhexidine 0.12% Liquid 15 milliLiter(s) Oral Mucosa two times a day  dextrose 5%. 1000 milliLiter(s) (100 mL/Hr) IV Continuous <Continuous>  dextrose 5%. 1000 milliLiter(s) (50 mL/Hr) IV Continuous <Continuous>  dextrose 50% Injectable 25 Gram(s) IV Push once  dextrose 50% Injectable 25 Gram(s) IV Push once  dextrose 50% Injectable 12.5 Gram(s) IV Push once  ferrous    sulfate Liquid 300 milliGRAM(s) Enteral Tube daily  folic acid 1 milliGRAM(s) Oral daily  glucagon  Injectable 1 milliGRAM(s) IntraMuscular once  heparin   Injectable 5000 Unit(s) SubCutaneous every 12 hours  insulin lispro (ADMELOG) corrective regimen sliding scale   SubCutaneous three times a day before meals  levETIRAcetam  Solution 500 milliGRAM(s) Oral two times a day  midodrine. 5 milliGRAM(s) Oral three times a day  multivitamin 1 Tablet(s) Oral daily  pantoprazole  Injectable 40 milliGRAM(s) IV Push every 12 hours  silver sulfADIAZINE 1% Cream 1 Application(s) Topical every 12 hours  tamsulosin 0.4 milliGRAM(s) Oral at bedtime  zinc sulfate 220 milliGRAM(s) Oral daily    PRN:  acetaminophen     Tablet .. 650 milliGRAM(s) Oral every 6 hours PRN  dextrose Oral Gel 15 Gram(s) Oral once PRN      HOME MEDICATIONS:  Home Medications:  albuterol 2.5 mg/3 mL (0.083%) inhalation solution: 1 unit(s) inhaled every 4 hours, As Needed (04 Oct 2022 11:00)  ascorbic acid 500 mg oral tablet: 1 tab(s) orally once a day (31 May 2023 01:05)  aspirin 81 mg oral tablet, chewable: 1 tab(s) orally once a day (04 Oct 2022 11:00)  atorvastatin 80 mg oral tablet: 1 tab(s) orally once a day (04 Oct 2022 11:00)  Atrovent 18 mcg/inh inhalation aerosol: 1 unit(s) inhaled every 6 hours (04 Oct 2022 11:00)  Bactrim  mg-160 mg oral tablet: 1 tablet with sensor orally every 12 hours (31 May 2023 01:17)  chlorhexidine 0.12% mucous membrane liquid: 15 milliliter(s) buccal 2 times a day (31 May 2023 00:59)  doxazosin 4 mg oral tablet: 1 tab(s) orally once a day (at bedtime) (31 May 2023 01:00)  epoetin guan[place]ito 40,000 units/mL injectable solution: 1 milliliter(s) injectable once a week (31 May 2023 01:04)  ergocalciferol 200 mcg/mL (8000 intl units/mL) oral solution: 0.25 milliliter(s) orally once a day (04 Oct 2022 11:00)  famotidine 10 mg oral tablet: 1 tab(s) orally 2 times a day (04 Oct 2022 11:00)  ferrous sulfate 220 mg/5 mL (44 mg/5 mL elemental iron) oral elixir: 7.5 milliliter(s) by gastrostomy tube once a day (04 Oct 2022 11:00)  folic acid 1 mg oral tablet: 1 tab(s) orally once a day (04 Oct 2022 11:00)  furosemide 40 mg oral tablet: 1 tab(s) orally once a day (31 May 2023 01:16)  imipenem-cilastatin 500 mg injection: 500 milligram(s) injectable every 6 hours (31 May 2023 01:17)  insulin lispro 100 units/mL injectable solution: injectable Sliding scale (31 May 2023 01:17)  Keppra 100 mg/mL oral solution: 5 milliliter(s) by gastrostomy tube 2 times a day (04 Oct 2022 11:00)  midodrine 5 mg oral tablet: 1 tab(s) orally 3 times a day (04 Oct 2022 11:00)  Multiple Vitamins oral tablet: 1 tab(s) orally once a day via PEG (04 Oct 2022 11:00)  SSD 1% topical cream: Apply topically to affected area every 12 hours (31 May 2023 01:17)  tamsulosin 0.4 mg oral capsule: 1 cap(s) orally once a day (at bedtime) (13 Oct 2022 10:24)  Tylenol 325 mg oral tablet: 2 tab(s) orally once a day (04 Oct 2022 11:00)  zinc sulfate 220 mg oral tablet: 1 tab(s) orally once a day (31 May 2023 01:16)      VITALS:   T(F): 96.2 ( @ 08:35), Max: 96.6 ( @ 20:30)  HR: 86 ( @ 08:35) (80 - 147)  BP: 95/54 ( @ 08:35) (85/53 - 97/54)  BP(mean): 65 ( @ 20:30) (65 - 68)  RR: 24 ( @ 08:35) (14 - 24)  SpO2: 100% ( @ 08:35) (99% - 100%)    I&O's Summary    30 May 2023 07:01  -  31 May 2023 07:00  --------------------------------------------------------  IN: 0 mL / OUT: 50 mL / NET: -50 mL        REVIEW OF SYSTEMS:  N/A    PHYSICAL EXAM:  General: Not in distress.  Cardio: regular, S1, S2, no murmur  Pulm: decreased B/L BS  Abdomen: Soft, non-tender  Extremities: 2+ edema b/l le  Neuro: A&O x0, contracted upper extremities      LABS:                        6.7    9.00  )-----------( 370      ( 31 May 2023 05:50 )             20.3         120<L>  |  82<L>  |  233<HH>  ----------------------------<  333<H>  3.3<L>   |  13<L>  |  4.1<HH>    Ca    7.5<L>      31 May 2023 05:50  Mg     2.8         TPro  4.8<L>  /  Alb  1.7<L>  /  TBili  0.4  /  DBili  x   /  AST  10  /  ALT  <5  /  AlkPhos  163<H>      PT/INR - ( 30 May 2023 20:08 )   PT: 14.10 sec;   INR: 1.23 ratio      PTT - ( 30 May 2023 20:08 )  PTT:32.3 sec      Troponin T, Serum: 1.57 ng/mL ** (23 @ 05:50)  Troponin T, Serum: 1.63 ng/mL ** (23 @ 03:05)  Troponin T, Serum: 1.80 ng/mL ** (23 @ 20:08)        EC Lead ECG:   Ventricular Rate 143 BPM    Atrial Rate 288 BPM    QRS Duration 90 ms    Q-T Interval 282 ms    QTC Calculation(Bazett) 435 ms    R Axis 11 degrees    T Axis -40 degrees    Diagnosis Line Atrial flutter with variable A-V block  Minimal voltage criteria for LVH, may be normal variant ( R in aVL )  Inferior infarct , age undetermined  Abnormal ECG

## 2023-05-31 NOTE — CONSULT NOTE ADULT - PROBLEM SELECTOR RECOMMENDATION 9
MICHELLE on CKD3. High risk  -hypokalemia/hyponatremia  -s/p 2u PRBC with good urine output  -likely will require dialysis- renal already discussed with family and they are agreeable to dialysis  -trend creatinine and UOP  -f/u renal

## 2023-05-31 NOTE — CONSULT NOTE ADULT - CONVERSATION DETAILS
Spoke with Yaihr over the phone. Palliative care introduced. She was able to provide a medical update and hospital course. Discussed the patient functional and cognitive status. She noted that he's usually awake and alert. She noted he's been less alert and aware over the last few weeks.    She noted that she spoke with the primary team and renal teams, and the plan is to possibly initiate dialysis in the next 24 hours.  We discussed overall GOC. She noted the patient is DNR. We discussed the patient's comorbidities and overall poor prognosis, and noted that dialysis may be needed indefinitely.  She noted that she'd liked to try dialysis if it won't hurt him. All questions answered.

## 2023-05-31 NOTE — ED ADULT NURSE REASSESSMENT NOTE - NS ED NURSE REASSESS COMMENT FT1
Pt. received from previous RN. Pt. lying on stretcher, breathing via trach to vent. On CCM. 18g noted to B/L AC; IV intact, no redness/swelling at site. Insulin running via Alaris pump at 10 mL/hr. Amiodarone running at 16.7 mL/hr. Peg tube noted; medications crushed and given through tube. Un-stagable ulcer noted on sacrum. Pt. has skin breakdown noted around groin/pelvic area. Awaiting clean bed assignment. Pt. received from previous RN. Pt. lying on stretcher, breathing via trach to vent. On CCM. 18g noted to B/L AC; IV intact, no redness/swelling at site. Insulin running via Alaris pump at 10 mL/hr. Amiodarone running at 16.7 mL/hr. Peg tube noted; medications crushed and given through tube. Un-stagable ulcer noted on sacrum. Pt. has skin breakdown noted around groin/pelvic area. Barney noted, draining dark, erika urine. Awaiting clean bed assignment.

## 2023-05-31 NOTE — CONSULT NOTE ADULT - SUBJECTIVE AND OBJECTIVE BOX
NEPHROLOGY CONSULTATION NOTE    ALICIA BARBOUR  63y  Male  MRN-[id number]    CC:   Patient is a 63y old  Male who presents with a chief complaint of Abnormal labs (30 May 2023 23:39)      HPI:  64 yo M with  PMH of ICH () s/p trach and PEG, HTN, HLD, T2DM, CAD s/p stents, Recurrent C diff, BIBEMS from [facility] for abnormal labs. Patient non-verbal at baseline - limited history. Per NH chart  have a BUN greater than 200 and creatinine of 4.3 on outpatient labs. Outpatient CXR also w/ new L sided pleural effusion. With EMS patient was also found to be in irregular rhythm, no known history of A-fib or a flutter.  In ED patient had borderline BP, and was in Atrial fibrillation. He was started on amiodarone gtt. Labs showed , Cr. 4.1, Hb 6.6. Trops 1.8. He was given 1U PRBC, gastric lavage revealed coffee ground emesis w/no active bleeding. He was started on PPI drip and GI consulted in ED. He was given IV aztreonam and vancomycin.   Patient being admitted to ICU for management of Sepsis likely from UTI, MICHELLE likely prerenal and NSTEMI.        (30 May 2023 23:39)      PAST MEDICAL & SURGICAL HISTORY:  HTN (hypertension)      Diabetes mellitus      H/O intracranial hemorrhage      H/O tracheostomy      PEG (percutaneous endoscopic gastrostomy) status      Hyperlipidemia        Allergies:  penicillin (Other)    Home Medications Reviewed  Hospital Medications:   MEDICATIONS  (STANDING):  albuterol/ipratropium for Nebulization 3 milliLiter(s) Nebulizer every 6 hours  aMIOdarone Infusion 1 mG/Min (33.3 mL/Hr) IV Continuous <Continuous>  aMIOdarone Infusion 0.5 mG/Min (16.7 mL/Hr) IV Continuous <Continuous>  ascorbic acid 500 milliGRAM(s) Oral daily  aspirin  chewable 81 milliGRAM(s) Oral daily  atorvastatin 80 milliGRAM(s) Oral at bedtime  cefepime   IVPB 1000 milliGRAM(s) IV Intermittent every 12 hours  chlorhexidine 0.12% Liquid 15 milliLiter(s) Oral Mucosa two times a day  dextrose 5%. 1000 milliLiter(s) (100 mL/Hr) IV Continuous <Continuous>  dextrose 5%. 1000 milliLiter(s) (50 mL/Hr) IV Continuous <Continuous>  dextrose 50% Injectable 25 Gram(s) IV Push once  dextrose 50% Injectable 25 Gram(s) IV Push once  dextrose 50% Injectable 12.5 Gram(s) IV Push once  ferrous    sulfate Liquid 300 milliGRAM(s) Enteral Tube daily  folic acid 1 milliGRAM(s) Oral daily  glucagon  Injectable 1 milliGRAM(s) IntraMuscular once  heparin   Injectable 5000 Unit(s) SubCutaneous every 12 hours  insulin lispro (ADMELOG) corrective regimen sliding scale   SubCutaneous three times a day before meals  levETIRAcetam  Solution 500 milliGRAM(s) Oral two times a day  midodrine. 5 milliGRAM(s) Oral three times a day  multivitamin 1 Tablet(s) Oral daily  pantoprazole  Injectable 40 milliGRAM(s) IV Push every 12 hours  silver sulfADIAZINE 1% Cream 1 Application(s) Topical every 12 hours  tamsulosin 0.4 milliGRAM(s) Oral at bedtime  zinc sulfate 220 milliGRAM(s) Oral daily    MEDICATIONS  (PRN):  acetaminophen     Tablet .. 650 milliGRAM(s) Oral every 6 hours PRN Temp greater or equal to 38C (100.4F), Mild Pain (1 - 3)  dextrose Oral Gel 15 Gram(s) Oral once PRN Blood Glucose LESS THAN 70 milliGRAM(s)/deciliter    Home Medications:  albuterol 2.5 mg/3 mL (0.083%) inhalation solution: 1 unit(s) inhaled every 4 hours, As Needed (04 Oct 2022 11:00)  ascorbic acid 500 mg oral tablet: 1 tab(s) orally once a day (31 May 2023 01:05)  aspirin 81 mg oral tablet, chewable: 1 tab(s) orally once a day (04 Oct 2022 11:00)  atorvastatin 80 mg oral tablet: 1 tab(s) orally once a day (04 Oct 2022 11:00)  Atrovent 18 mcg/inh inhalation aerosol: 1 unit(s) inhaled every 6 hours (04 Oct 2022 11:00)  Bactrim  mg-160 mg oral tablet: 1 tablet with sensor orally every 12 hours (31 May 2023 01:17)  chlorhexidine 0.12% mucous membrane liquid: 15 milliliter(s) buccal 2 times a day (31 May 2023 00:59)  doxazosin 4 mg oral tablet: 1 tab(s) orally once a day (at bedtime) (31 May 2023 01:00)  epoetin guanakito 40,000 units/mL injectable solution: 1 milliliter(s) injectable once a week (31 May 2023 01:04)  ergocalciferol 200 mcg/mL (8000 intl units/mL) oral solution: 0.25 milliliter(s) orally once a day (04 Oct 2022 11:00)  famotidine 10 mg oral tablet: 1 tab(s) orally 2 times a day (04 Oct 2022 11:00)  ferrous sulfate 220 mg/5 mL (44 mg/5 mL elemental iron) oral elixir: 7.5 milliliter(s) by gastrostomy tube once a day (04 Oct 2022 11:00)  folic acid 1 mg oral tablet: 1 tab(s) orally once a day (04 Oct 2022 11:00)  furosemide 40 mg oral tablet: 1 tab(s) orally once a day (31 May 2023 01:16)  imipenem-cilastatin 500 mg injection: 500 milligram(s) injectable every 6 hours (31 May 2023 01:17)  insulin lispro 100 units/mL injectable solution: injectable Sliding scale (31 May 2023 01:17)  Keppra 100 mg/mL oral solution: 5 milliliter(s) by gastrostomy tube 2 times a day (04 Oct 2022 11:00)  midodrine 5 mg oral tablet: 1 tab(s) orally 3 times a day (04 Oct 2022 11:00)  Multiple Vitamins oral tablet: 1 tab(s) orally once a day via PEG (04 Oct 2022 11:00)  SSD 1% topical cream: Apply topically to affected area every 12 hours (31 May 2023 01:17)  tamsulosin 0.4 mg oral capsule: 1 cap(s) orally once a day (at bedtime) (13 Oct 2022 10:24)  Tylenol 325 mg oral tablet: 2 tab(s) orally once a day (04 Oct 2022 11:00)  zinc sulfate 220 mg oral tablet: 1 tab(s) orally once a day (31 May 2023 01:16)      SOCIAL HISTORY:  Social History:      FAMILY HISTORY:      REVIEW OF SYSTEMS:  All other review of systems is negative unless indicated above.    VITALS:  T(F): 96.2 (23 @ 08:35), Max: 96.6 (23 @ 20:30)  HR: 86 (23 @ 08:35)  BP: 95/54 (23 @ 08:35)  RR: 24 (23 @ 08:35)  SpO2: 100% (23 @ 08:35)  Mode: AC/ CMV (Assist Control/ Continuous Mandatory Ventilation)  RR (machine): 14  TV (machine): 500  FiO2: 60  PEEP: 8  ITime: 0.9  MAP: 12  PIP: 23      Weight (kg): 102 ( @ 06:26)  I&O's Detail    30 May 2023 07:  -  31 May 2023 07:00  --------------------------------------------------------  IN:  Total IN: 0 mL    OUT:    Indwelling Catheter - Urethral (mL): 50 mL  Total OUT: 50 mL    Total NET: -50 mL        Mode: AC/ CMV (Assist Control/ Continuous Mandatory Ventilation), RR (machine): 14, TV (machine): 500, FiO2: 60, PEEP: 8, ITime: 0.9, MAP: 12, PIP: 23    I&O's Summary    30 May 2023 07:01  -  31 May 2023 07:00  --------------------------------------------------------  IN: 0 mL / OUT: 50 mL / NET: -50 mL        PHYSICAL EXAM:  Gen: NAD  resp: CTAB  card: S1/S2  abd: soft  ext: no edema  vascular access:     LABS:  Daily     Daily   ABG - ( 31 May 2023 02:23 )  pH, Arterial: 7.35  pH, Blood: x     /  pCO2: 21    /  pO2: 260   / HCO3: 12    / Base Excess: -12.4 /  SaO2: 99.0              Lactate, Blood: 3.7 mmol/L (23 @ 04:35)  Blood Gas Arterial, Lactate: 3.40 mmol/L (23 @ 02:23)  Blood Gas Arterial - Potassium: 3.1 mmol/L (23 @ 02:23)        120<L>  |  82<L>  |  233<HH>  ----------------------------<  333<H>  3.3<L>   |  13<L>  |  4.1<HH>    Ca    7.5<L>      31 May 2023 05:50  Mg     2.8         TPro  4.8<L>  /  Alb  1.7<L>  /  TBili  0.4  /  DBili      /  AST  10  /  ALT  <5  /  AlkPhos  163<H>      Creatinine Trend:   Creatinine, Serum: 4.1 mg/dL (23 @ 05:50)  Creatinine, Serum: 4.1 mg/dL (23 @ 03:05)  Creatinine, Serum: 4.1 mg/dL (23 @ 20:08)  Creatinine, Serum: 1.1 mg/dL (10-13-22 @ 08:43)  Creatinine, Serum: 1.1 mg/dL (10-12-22 @ 04:30)                          6.7    9.00  )-----------( 370      ( 31 May 2023 05:50 )             20.3     Mean Cell Volume: 86.8 fL (23 @ 05:50)    Urine Studies:  Urinalysis Basic - ( 30 May 2023 21:15 )    Color: Jazmine / Appearance: Turbid / S.023 / pH:   Gluc:  / Ketone: Trace  / Bili: Negative / Urobili: 6 mg/dL   Blood:  / Protein: 100 mg/dL / Nitrite: Negative   Leuk Esterase: Large / RBC: 355 /HPF /  /HPF   Sq Epi:  / Non Sq Epi:  / Bacteria: Negative      Sodium, Random Urine: 96.0 mmoL/L ( @ 05:50)  Osmolality, Random Urine: 347 mos/kg ( @ 05:50)            RADIOLOGY & ADDITIONAL STUDIES:    US Kidney and Bladder:   ACC: [id number] EXAM:  US KIDNEYS AND BLADDER   ORDERED BY: JASEN CARROLL     PROCEDURE DATE:  2023          INTERPRETATION:  CLINICAL INFORMATION: Renal failure.    COMPARISON: CT scan dated 2022.    TECHNIQUE: Sonography of the kidneys and bladder.    FINDINGS:    Right kidney: 11.8 x 5.4 x 6.1 cm. No hydronephrosis or calculi.    Left kidney:  12.8 x 5.8 x 5.4 cm. No hydronephrosis or calculi.    Urinary bladder: Barney catheter is seen within a nondistended urinary   bladder.    The prostate is not visualized.    Ascites is noted.    IMPRESSION:    Normal renal ultrasound.        --- End of Report ---            ANDRES CUI MD; Attending Interventional Radiologist  This document has been electronically signed. May 31 2023  7:44AM (23 @ 00:33)      Xray Chest 1 View- PORTABLE-Urgent:   ACC: [id number] EXAM:  XR CHEST PORTABLE URGENT 1V   ORDERED BY: NIKKIE MARY     PROCEDURE DATE:  2023          INTERPRETATION:  Clinical History / Reason for exam: Confirm subclavian   placement    Comparison : Chest radiograph same dayexam at 7:48 PM.    Technique/Positioning: AP.    Findings:    Support devices: A tracheostomy tube is in place. Right subclavian   central venous catheter is seen with tip in the left brachiocephalic vein.    Cardiac/mediastinum/hilum: Stable cardiomegaly.    Lung parenchyma/Pleura: Left pleural effusion and basilar opacity, and   the right lung opacity, unchanged.    Skeleton/soft tissues: Stable.    Impression:    Unchanged left pleural effusion and basilar opacity, and the right lung   opacity.    Right subclavian central venous catheter with tip in the left   brachiocephalic vein. Recommend repositioning.        --- End of Report ---            STARLA READ MD; Attending Radiologist  This document has been electronically signed. May 31 2023  8:44AM (23 @ 22:49)                     NEPHROLOGY CONSULTATION NOTE    ALICIA BARBOUR  63y  Male  MRN-[id number]    CC:   Patient is a 63y old  Male who presents with a chief complaint of Abnormal labs (30 May 2023 23:39)      HPI:  62 yo M with  PMH of ICH () s/p trach and PEG, HTN, HLD, T2DM, CAD s/p stents, Recurrent C diff, BIBEMS from [facility] for abnormal labs. Patient non-verbal at baseline - limited history. Per NH chart  have a BUN greater than 200 and creatinine of 4.3 on outpatient labs. Outpatient CXR also w/ new L sided pleural effusion. With EMS patient was also found to be in irregular rhythm, no known history of A-fib or a flutter.  In ED patient had borderline BP, and was in Atrial fibrillation. He was started on amiodarone gtt. Labs showed , Cr. 4.1, Hb 6.6. Trops 1.8. He was given 1U PRBC, gastric lavage revealed coffee ground emesis w/no active bleeding. He was started on PPI drip and GI consulted in ED. He was given IV aztreonam and vancomycin.   Patient being admitted to ICU for management of Sepsis likely from UTI, MICHELLE likely prerenal and NSTEMI.        (30 May 2023 23:39)      PAST MEDICAL & SURGICAL HISTORY:  HTN (hypertension)      Diabetes mellitus      H/O intracranial hemorrhage      H/O tracheostomy      PEG (percutaneous endoscopic gastrostomy) status      Hyperlipidemia        Allergies:  penicillin (Other)    Home Medications Reviewed  Hospital Medications:   MEDICATIONS  (STANDING):  albuterol/ipratropium for Nebulization 3 milliLiter(s) Nebulizer every 6 hours  aMIOdarone Infusion 1 mG/Min (33.3 mL/Hr) IV Continuous <Continuous>  aMIOdarone Infusion 0.5 mG/Min (16.7 mL/Hr) IV Continuous <Continuous>  ascorbic acid 500 milliGRAM(s) Oral daily  aspirin  chewable 81 milliGRAM(s) Oral daily  atorvastatin 80 milliGRAM(s) Oral at bedtime  cefepime   IVPB 1000 milliGRAM(s) IV Intermittent every 12 hours  ferrous    sulfate Liquid 300 milliGRAM(s) Enteral Tube daily  folic acid 1 milliGRAM(s) Oral daily  heparin   Injectable 5000 Unit(s) SubCutaneous every 12 hours  insulin lispro (ADMELOG) corrective regimen sliding scale   SubCutaneous three times a day before meals  levETIRAcetam  Solution 500 milliGRAM(s) Oral two times a day  midodrine. 5 milliGRAM(s) Oral three times a day  multivitamin 1 Tablet(s) Oral daily  pantoprazole  Injectable 40 milliGRAM(s) IV Push every 12 hours  silver sulfADIAZINE 1% Cream 1 Application(s) Topical every 12 hours  tamsulosin 0.4 milliGRAM(s) Oral at bedtime  zinc sulfate 220 milliGRAM(s) Oral daily    MEDICATIONS  (PRN):  acetaminophen     Tablet .. 650 milliGRAM(s) Oral every 6 hours PRN Temp greater or equal to 38C (100.4F), Mild Pain (1 - 3)  dextrose Oral Gel 15 Gram(s) Oral once PRN Blood Glucose LESS THAN 70 milliGRAM(s)/deciliter    Home Medications:  albuterol 2.5 mg/3 mL (0.083%) inhalation solution: 1 unit(s) inhaled every 4 hours, As Needed (04 Oct 2022 11:00)  ascorbic acid 500 mg oral tablet: 1 tab(s) orally once a day (31 May 2023 01:05)  aspirin 81 mg oral tablet, chewable: 1 tab(s) orally once a day (04 Oct 2022 11:00)  atorvastatin 80 mg oral tablet: 1 tab(s) orally once a day (04 Oct 2022 11:00)  Atrovent 18 mcg/inh inhalation aerosol: 1 unit(s) inhaled every 6 hours (04 Oct 2022 11:00)  Bactrim  mg-160 mg oral tablet: 1 tablet with sensor orally every 12 hours (31 May 2023 01:17)  chlorhexidine 0.12% mucous membrane liquid: 15 milliliter(s) buccal 2 times a day (31 May 2023 00:59)  doxazosin 4 mg oral tablet: 1 tab(s) orally once a day (at bedtime) (31 May 2023 01:00)  epoetin guanakito 40,000 units/mL injectable solution: 1 milliliter(s) injectable once a week (31 May 2023 01:04)  ergocalciferol 200 mcg/mL (8000 intl units/mL) oral solution: 0.25 milliliter(s) orally once a day (04 Oct 2022 11:00)  famotidine 10 mg oral tablet: 1 tab(s) orally 2 times a day (04 Oct 2022 11:00)  ferrous sulfate 220 mg/5 mL (44 mg/5 mL elemental iron) oral elixir: 7.5 milliliter(s) by gastrostomy tube once a day (04 Oct 2022 11:00)  folic acid 1 mg oral tablet: 1 tab(s) orally once a day (04 Oct 2022 11:00)  furosemide 40 mg oral tablet: 1 tab(s) orally once a day (31 May 2023 01:16)  imipenem-cilastatin 500 mg injection: 500 milligram(s) injectable every 6 hours (31 May 2023 01:17)  insulin lispro 100 units/mL injectable solution: injectable Sliding scale (31 May 2023 01:17)  Keppra 100 mg/mL oral solution: 5 milliliter(s) by gastrostomy tube 2 times a day (04 Oct 2022 11:00)  midodrine 5 mg oral tablet: 1 tab(s) orally 3 times a day (04 Oct 2022 11:00)  Multiple Vitamins oral tablet: 1 tab(s) orally once a day via PEG (04 Oct 2022 11:00)  SSD 1% topical cream: Apply topically to affected area every 12 hours (31 May 2023 01:17)  tamsulosin 0.4 mg oral capsule: 1 cap(s) orally once a day (at bedtime) (13 Oct 2022 10:24)  Tylenol 325 mg oral tablet: 2 tab(s) orally once a day (04 Oct 2022 11:00)  zinc sulfate 220 mg oral tablet: 1 tab(s) orally once a day (31 May 2023 01:16)      SOCIAL HISTORY:  Social History:      FAMILY HISTORY:      REVIEW OF SYSTEMS:  Unable to obtain    VITALS:  T(F): 96.2 (23 @ 08:35), Max: 96.6 (23 @ 20:30)  HR: 86 (23 @ 08:35)  BP: 95/54 (23 @ 08:35)  RR: 24 (23 @ 08:35)  SpO2: 100% (23 @ 08:35)  Mode: AC/ CMV (Assist Control/ Continuous Mandatory Ventilation)  RR (machine): 14  TV (machine): 500  FiO2: 60  PEEP: 8  ITime: 0.9  MAP: 12  PIP: 23      Weight (kg): 102 ( @ 06:26)  I&O's Detail    30 May 2023 07:  -  31 May 2023 07:00  --------------------------------------------------------  IN:  Total IN: 0 mL    OUT:    Indwelling Catheter - Urethral (mL): 50 mL  Total OUT: 50 mL    Total NET: -50 mL        Mode: AC/ CMV (Assist Control/ Continuous Mandatory Ventilation), RR (machine): 14, TV (machine): 500, FiO2: 60, PEEP: 8, ITime: 0.9, MAP: 12, PIP: 23    I&O's Summary    30 May 2023 07:  -  31 May 2023 07:00  --------------------------------------------------------  IN: 0 mL / OUT: 50 mL / NET: -50 mL        PHYSICAL EXAM:  Gen: trach/ventilated  resp: b/l breath sounds  card: S1/S2  abd: soft, PEG   ext: +LE edema  [place]    LABS:  ABG - ( 31 May 2023 02:23 )  pH, Arterial: 7.35  pH, Blood: x     /  pCO2: 21    /  pO2: 260   / HCO3: 12    / Base Excess: -12.4 /  SaO2: 99.0      Lactate, Blood: 3.7 mmol/L (23 @ 04:35)  Blood Gas Arterial, Lactate: 3.40 mmol/L (23 @ 02:23)  Blood Gas Arterial - Potassium: 3.1 mmol/L (23 @ 02:23)        120<L>  |  82<L>  |  233<HH>  ----------------------------<  333<H>  3.3<L>   |  13<L>  |  4.1<HH>    Ca    7.5<L>      31 May 2023 05:50  Mg     2.8         TPro  4.8<L>  /  Alb  1.7<L>  /  TBili  0.4  /  DBili      /  AST  10  /  ALT  <5  /  AlkPhos  163<H>      Creatinine Trend:   Creatinine, Serum: 4.1 mg/dL (23 @ 05:50)  Creatinine, Serum: 4.1 mg/dL (23 @ 03:05)  Creatinine, Serum: 4.1 mg/dL (23 @ 20:08)  Creatinine, Serum: 1.1 mg/dL (10-13-22 @ 08:43)  Creatinine, Serum: 1.1 mg/dL (10-12-22 @ 04:30)    Sodium, Serum: 120 mmol/L (23 @ 12:09)  Sodium, Serum: 120 mmol/L (23 @ 05:50)  Sodium, Serum: 117 mmol/L (23 @ 03:05)  Sodium, Serum: 119 mmol/L (23 @ 20:08)  Sodium, Serum: 142 mmol/L (10-13-22 @ 08:43)  Sodium, Serum: 145 mmol/L (10-12-22 @ 04:30)  Sodium, Serum: 144 mmol/L (10-11-22 @ 08:55)  Sodium, Serum: 139 mmol/L (10-10-22 @ 12:04)  Sodium, Serum: 141 mmol/L (10-09-22 @ 17:12)  Sodium, Serum: 144 mmol/L (10-08-22 @ 08:05)  Sodium, Serum: 149 mmol/L (10-07-22 @ 09:40)  Sodium, Serum: 155 mmol/L (10-06-22 @ 23:24)  Sodium, Serum: 149 mmol/L (10-06-22 @ 11:34)  Sodium, Serum: 153 mmol/L (10-06-22 @ 07:52)  Sodium, Serum: 148 mmol/L (10-05-22 @ 19:32)  Sodium, Serum: 149 mmol/L (10-05-22 @ 08:05)  Sodium, Serum: 143 mmol/L (10-04-22 @ 11:26)  Sodium, Serum: 139 mmol/L (10-04-22 @ 00:22)  Sodium, Serum: 137 mmol/L (22 @ 06:42)  Sodium, Serum: 130 mmol/L (22 @ 10:41)    Osmolality, Random Urine: 347 mos/kg [50 - 1200] (23 @ 05:50)  Osmolality, Random Urine: 260 mos/kg [50 - 1200] (10-04-22 @ 07:21)  Osmolality, Random Urine: 400 mos/kg [300 - 900] (22 @ 17:35)  Osmolality, Random Urine: 466 mos/kg [300 - 900] (11-15-21 @ 11:17)    Sodium, Random Urine: 96.0 mmoL/L (23 @ 05:50)  Sodium, Random Urine: 77.0 mmoL/L (10-04-22 @ 07:21)  Sodium, Random Urine: 20 mmol/L (22 @ 17:35)  Sodium, Random Urine: 128 mmol/L (11-15-21 @ 11:17)  Sodium, Random Urine: 51 mmol/L (21 @ 07:10)    Thyroid Stimulating Hormone, Serum: 1.09 uIU/mL [0.27 - 4.20] (21 @ 15:24)                              6.7    9.00  )-----------( 370      ( 31 May 2023 05:50 )             20.3     Mean Cell Volume: 86.8 fL (23 @ 05:50)    Urine Studies:  Urinalysis Basic - ( 30 May 2023 21:15 )    Color: Jazmine / Appearance: Turbid / S.023 / pH:   Gluc:  / Ketone: Trace  / Bili: Negative / Urobili: 6 mg/dL   Blood:  / Protein: 100 mg/dL / Nitrite: Negative   Leuk Esterase: Large / RBC: 355 /HPF /  /HPF   Sq Epi:  / Non Sq Epi:  / Bacteria: Negative      Sodium, Random Urine: 96.0 mmoL/L ( @ 05:50)  Osmolality, Random Urine: 347 mos/kg ( @ 05:50)            RADIOLOGY & ADDITIONAL STUDIES:    US Kidney and Bladder:   ACC: [id number] EXAM:  US KIDNEYS AND BLADDER   ORDERED BY: JASEN CARROLL     PROCEDURE DATE:  2023          INTERPRETATION:  CLINICAL INFORMATION: Renal failure.    COMPARISON: CT scan dated 2022.    TECHNIQUE: Sonography of the kidneys and bladder.    FINDINGS:    Right kidney: 11.8 x 5.4 x 6.1 cm. No hydronephrosis or calculi.    Left kidney:  12.8 x 5.8 x 5.4 cm. No hydronephrosis or calculi.    Urinary bladder: Barney catheter is seen within a nondistended urinary   bladder.    The prostate is not visualized.    Ascites is noted.    IMPRESSION:    Normal renal ultrasound.        --- End of Report ---            ANDRES CUI MD; Attending Interventional Radiologist  This document has been electronically signed. May 31 2023  7:44AM (23 @ 00:33)      Xray Chest 1 View- PORTABLE-Urgent:   ACC: [id number] EXAM:  XR CHEST PORTABLE URGENT 1V   ORDERED BY: NIKKIE MARY     PROCEDURE DATE:  2023          INTERPRETATION:  Clinical History / Reason for exam: Confirm subclavian   placement    Comparison : Chest radiograph same dayexam at 7:48 PM.    Technique/Positioning: AP.    Findings:    Support devices: A tracheostomy tube is in place. Right subclavian   central venous catheter is seen with tip in the left brachiocephalic vein.    Cardiac/mediastinum/hilum: Stable cardiomegaly.    Lung parenchyma/Pleura: Left pleural effusion and basilar opacity, and   the right lung opacity, unchanged.    Skeleton/soft tissues: Stable.    Impression:    Unchanged left pleural effusion and basilar opacity, and the right lung   opacity.    Right subclavian central venous catheter with tip in the left   brachiocephalic vein. Recommend repositioning.        --- End of Report ---            STARLA READ MD; Attending Radiologist  This document has been electronically signed. May 31 2023  8:44AM (23 @ 22:49)

## 2023-05-31 NOTE — ED ADULT NURSE REASSESSMENT NOTE - NS ED NURSE REASSESS COMMENT FT1
Insulin gtt ordered for patient- patient only has one IV in place and amiodarone is infusing through it- unable to obtain 2nd IV- MD made aware that insulin drip is not able to be started until IVUS is placed.

## 2023-05-31 NOTE — CONSULT NOTE ADULT - ASSESSMENT
MICHELLE on CKD 3a  Hyponatremia / hypokalemia  Acute anemia r/o UGIB  Troponemia  Lactic acidosis  DKA   Afib on amiodarone      - replete K aggressively, will lower on insulin  - sepsis w/u  - s/p 2 units RBCs and high dose diuretic / non oliguric, has good urine output   - GI eval  - dose meds for eGFR < 15   - teixeira, strict i/o   - d/c tamsulosin d/t hypotension  - increase midodrine to 10mg q8h  - start sodium bicarb po 1300mg q8h when able to receive meds by peg  - will need RRT if no improvement in renal function / no urgent indication as of now  MICHELLE on CKD 3a  Hyponatremia / hypokalemia  Acute anemia r/o UGIB  Troponemia  Lactic acidosis  DKA   Afib on amiodarone      - replete K aggressively, will lower on insulin  - sepsis w/u  - s/p 2 units RBCs and high dose diuretic / non oliguric, has good urine output   - GI eval  - dose meds for eGFR < 15   - teixeira, strict i/o   - d/c tamsulosin d/t hypotension  - increase midodrine to 10mg q8h  - start sodium bicarb po 1300mg q8h when able to receive meds by peg  - will need RRT if no improvement in renal function / discussed with patient's wife Patricio and daughter Sanam, agreeable to RRT if needed  - repeat chemistry reviewed, no improvement-- will dialyze once udall catheter is placed.  (HD is not an emergency and will be done in AM depending when udall catheter is placed)   MICHELLE on CKD 3a  Hyponatremia / hypokalemia  Acute anemia r/o UGIB  Troponemia  Lactic acidosis  DKA   Afib on amiodarone      - replete K aggressively, will lower on insulin  - sepsis w/u  - s/p 2 units RBCs and high dose diuretic / non oliguric, has good urine output   - GI eval  - dose meds for eGFR < 15   - teixeira, strict i/o   - d/c tamsulosin d/t hypotension  - increase midodrine to 10mg q8h  - start sodium bicarb po 1300mg q8h when able to receive meds by peg  - will need RRT if no improvement in renal function / discussed with patient's wife Patricio and daughter Sanam, agreeable to RRT if needed  - repeat chemistry reviewed, no improvement-- will dialyze once udall catheter is placed.  (HD is not an emergency and will be done in AM depending when udall catheter is placed)  - cardio eval for high troponin prior to HD  - d/w ICU team, pending palliative care discussions with family before deciding on RRT

## 2023-05-31 NOTE — CONSULT NOTE ADULT - ASSESSMENT
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 IF urine output not adequate can try a dose of metolazone   - TTE  - GI evaluation    - Consider palliative care evaluation     This a preliminary plan. Will need to discuss with attending.   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 IF urine output not adequate can try a dose of metolazone   - TTE  - GI evaluation    - Consider palliative care evaluation     Discussed with attending.   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   Signature: Rohini LUKE, 1st year Cardiology Fellow       Impression   # Type 2 MI secondary to demand ischemia   # Acute Decompensated CHF  # CAD s/p PCI   # Atrial flutter on IV Amiodarone - currently in NSR  # Massive ICH s/p evacuation and PICA aneurysm clip with quadriplegia and chronic respiratory failure s/p trach and peg in 2021  # DVT in 2021  # Anemia  # ?bladder malignancy   # MICHELLE    Sent for abnormal labs and found to have Hgb 6.6, a creatinine of 4 and BUN of 230  Beta hydroxy of 2.2    Not on anticoagulation presumably due to anemia?      Recommendations   - Continue Bumex 2 mg IV BID  - If unresponsive to Bumex, consider adding Metolazone  - Continue aspirin and statin   - TTE  - Consider palliative care evaluation       Discussed with attending.   Signature: Rohini LUKE, 1st year Cardiology Fellow

## 2023-05-31 NOTE — CONSULT NOTE ADULT - SUBJECTIVE AND OBJECTIVE BOX
CC: abnormal labs    HPI:  64 yo M with  PMH of ICH () s/p trach and PEG, HTN, HLD, T2DM, CAD s/p stents, Recurrent C diff, BIBEMS from [facility] for abnormal labs. Patient non-verbal at baseline - limited history. Per NH chart  have a BUN greater than 200 and creatinine of 4.3 on outpatient labs. Outpatient CXR also w/ new L sided pleural effusion. With EMS patient was also found to be in irregular rhythm, no known history of A-fib or a flutter.  In ED patient had borderline BP, and was in Atrial fibrillation. He was started on amiodarone gtt. Labs showed , Cr. 4.1, Hb 6.6. Trops 1.8. He was given 1U PRBC, gastric lavage revealed coffee ground emesis w/no active bleeding. He was started on PPI drip and GI consulted in ED. He was given IV aztreonam and vancomycin.   Patient being admitted to ICU for management of Sepsis likely from UTI, MICHELLE likely prerenal and NSTEMI.        (30 May 2023 23:39)    PERTINENT PM/SXH:   HTN (hypertension)    Diabetes mellitus    H/O intracranial hemorrhage    H/O tracheostomy    PEG (percutaneous endoscopic gastrostomy) status    Hyperlipidemia    FAMILY HISTORY:  unable to determine as patient obtunded    ITEMS NOT CHECKED ARE NOT PRESENT    SOCIAL HISTORY:   Significant other/partner[ ]  Children[ ]  Yarsani/Spirituality:  Substance hx:  [ ]   Tobacco hx:  [ ]   Alcohol hx: [ ]   Living Situation: [ ]Home  [ x]Long term care  [ ]Rehab [ ]Other  Home Services: [ ] HHA [ ] Maryuri RN [ ] Hospice  Occupation:  Home Opioid hx:  [ ] Y [ ] N [ x] I-Stop Reference No:  Reference #: [id number] - no meds    ADVANCE DIRECTIVES:    [ ] Full Code [x ] DNR  MOLST  [ ]  Living Will  [ ]   DECISION MAKER(s):  [ ] Health Care Proxy(s)  [x ] Surrogate(s)  [ ] Guardian           Name(s): Phone Number(s):  Wife Sandrita Bauer    BASELINE (I)ADL(s) (prior to admission):  Fulda: [ x]Total  [ ] Moderate [ ]Dependent  Palliative Performance Status Version 2:         20%    http://npcrc.org/files/news/palliative_performance_scale_ppsv2.pdf    Allergies    penicillin (Other)    Intolerances    MEDICATIONS  (STANDING):  albuterol/ipratropium for Nebulization 3 milliLiter(s) Nebulizer every 6 hours  aMIOdarone Infusion 1 mG/Min (33.3 mL/Hr) IV Continuous <Continuous>  aMIOdarone Infusion 0.5 mG/Min (16.7 mL/Hr) IV Continuous <Continuous>  ascorbic acid 500 milliGRAM(s) Oral daily  aspirin  chewable 81 milliGRAM(s) Oral daily  atorvastatin 80 milliGRAM(s) Oral at bedtime  buMETAnide Injectable 2 milliGRAM(s) IV Push every 12 hours  cefepime   IVPB 1000 milliGRAM(s) IV Intermittent every 12 hours  chlorhexidine 0.12% Liquid 15 milliLiter(s) Oral Mucosa two times a day  dextrose 5%. 1000 milliLiter(s) (100 mL/Hr) IV Continuous <Continuous>  dextrose 5%. 1000 milliLiter(s) (50 mL/Hr) IV Continuous <Continuous>  dextrose 50% Injectable 25 Gram(s) IV Push once  dextrose 50% Injectable 25 Gram(s) IV Push once  dextrose 50% Injectable 12.5 Gram(s) IV Push once  ferrous    sulfate Liquid 300 milliGRAM(s) Enteral Tube daily  folic acid 1 milliGRAM(s) Oral daily  glucagon  Injectable 1 milliGRAM(s) IntraMuscular once  heparin   Injectable 5000 Unit(s) SubCutaneous every 12 hours  insulin lispro (ADMELOG) corrective regimen sliding scale   SubCutaneous three times a day before meals  insulin regular Infusion 10 Unit(s)/Hr (10 mL/Hr) IV Continuous <Continuous>  levETIRAcetam  Solution 500 milliGRAM(s) Oral two times a day  midodrine. 5 milliGRAM(s) Oral three times a day  multivitamin 1 Tablet(s) Oral daily  pantoprazole  Injectable 40 milliGRAM(s) IV Push every 12 hours  silver sulfADIAZINE 1% Cream 1 Application(s) Topical every 12 hours  tamsulosin 0.4 milliGRAM(s) Oral at bedtime  zinc sulfate 220 milliGRAM(s) Oral daily    MEDICATIONS  (PRN):  acetaminophen     Tablet .. 650 milliGRAM(s) Oral every 6 hours PRN Temp greater or equal to 38C (100.4F), Mild Pain (1 - 3)  dextrose Oral Gel 15 Gram(s) Oral once PRN Blood Glucose LESS THAN 70 milliGRAM(s)/deciliter  sodium chloride 0.9% Bolus. 100 milliLiter(s) IV Bolus every 5 minutes PRN SBP LESS THAN or EQUAL to 80 mmHg    PRESENT SYMPTOMS: [x ]Unable to obtain due to poor mentation   Source if other than patient:  [ ]Family   [ ]Team     Pain: [ ]yes [ ]no  QOL impact -   Location -                    Aggravating factors -  Quality -  Radiation -  Timing-  Severity (0-10 scale):  Minimal acceptable level (0-10 scale):     CPOT:  1  https://www.[email or web address]/getattachment/[email or web address]/Critical-Care-Pain-Observation-Tool-(CPOT)    PAIN AD Score:   http://geriatrictoolkit.[email or web address]/painad.pdf (press ctrl +  left click to view)    Dyspnea:                           [ ]None[ ]Mild [ ]Moderate [ ]Severe     Respiratory Distress Observation Scale (RDOS): 2  A score of 0 to 2 signifies little or no respiratory distress, 3 signifies mild distress, scores 4 to 6 indicate moderate distress, and scores greater than 7 signify severe distress  [email or web address]://[email or web address]/sites/default/files/[email or web address]-zecdkucvxvs-kvbjsrjx-vpkrcjyfgiy-cupnq.pdf    Anxiety:                             [ ]None[ ]Mild [ ]Moderate [ ]Severe   Fatigue:                             [ ]None[ ]Mild [ ]Moderate [ ]Severe   Nausea:                             [ ]None[ ]Mild [ ]Moderate [ ]Severe   Loss of appetite:              [ ]None[ ]Mild [ ]Moderate [ ]Severe   Constipation:                    [ ]None[ ]Mild [ ]Moderate [ ]Severe    Other Symptoms:  [ ]All other review of systems negative     Palliative Performance Status Version 2:         10%    http://npcrc.org/files/news/palliative_performance_scale_ppsv2.pdf    PHYSICAL EXAM:  Vital Signs Last 24 Hrs  T(C): 35.7 (31 May 2023 08:35), Max: 35.9 (30 May 2023 20:30)  T(F): 96.2 (31 May 2023 08:35), Max: 96.6 (30 May 2023 20:30)  HR: 88 (31 May 2023 15:47) (80 - 147)  BP: 101/59 (31 May 2023 14:30) (85/53 - 104/59)  BP(mean): 65 (30 May 2023 20:30) (65 - 68)  RR: 22 (31 May 2023 14:30) (14 - 24)  SpO2: 99% (31 May 2023 15:47) (99% - 100%)    Parameters below as of 31 May 2023 14:30  Patient On (Oxygen Delivery Method): ventilator     I&O's Summary    30 May 2023 07:01  -  31 May 2023 07:00  --------------------------------------------------------  IN: 0 mL / OUT: 50 mL / NET: -50 mL    31 May 2023 07:01  -  31 May 2023 16:09  --------------------------------------------------------  IN: 0 mL / OUT: 900 mL / NET: -900 mL        GENERAL:  [ ] No acute distress [ ]Lethargic  [x ]Unarousable  [ ]Verbal  [ ]Non-Verbal [ ]Cachexia    BEHAVIORAL/PSYCH:  [ ]Alert and Oriented x  [ ] Anxiety [ ] Delirium [ ] Agitation [ x] Calm   EYES: [ ] No scleral icterus [ ] Scleral icterus [x ] Closed  ENMT:  [ ]Dry mouth  [ x]No external oral lesions [ ] No external ear or nose lesions  CARDIOVASCULAR:  [ ]Regular [ ]Irregular [x ]Tachy [ ]Not Tachy  [ ]Bridger [ ] Edema [ ] No edema  PULMONARY:  [ ]Tachypnea  [ ]Audible excessive secretions [ x] No labored breathing [ ] labored breathing  GASTROINTESTINAL: [ ]Soft  [ ]Distended  [x ]Not distended [ ]Non tender [ ]Tender  MUSCULOSKELETAL: [ ]No clubbing [ ] clubbing  [ x] No cyanosis [ ] cyanosis  NEUROLOGIC: [ ]No focal deficits  [ ]Follows commands  [x ]Does not follow commands  [ ]Cognitive impairment  [ ]Dysphagia  [ ]Dysarthria  [ ]Paresis   SKIN: [ ] Jaundiced [ x] Non-jaundiced [ ]Rash [ ]No Rash [ ] Warm [ ] Dry  MISC/LINES: [ ] ET tube [ x] Trach [ ]NGT/OGT [x ]PEG [ ]Barney    CRITICAL CARE:  [ ] Shock Present  [ ]Septic [ ]Cardiogenic [ ]Neurologic [ ]Hypovolemic  [ ]  Vasopressors [ ]  Inotropes   [x ]Respiratory failure present [ ]Mechanical ventilation [ ]Non-invasive ventilatory support [ ]High flow  [ ]Acute  [ ]Chronic [ ]Hypoxic  [ ]Hypercarbic [ ]Other  [ ]Other organ failure     LABS: reviewed by me                        6.7    9.00  )-----------( 370      ( 31 May 2023 05:50 )             20.3       120<L>  |  80<L>  |  227<HH>  ----------------------------<  295<H>  3.3<L>   |  14<L>  |  4.4<HH>    Ca    7.5<L>      31 May 2023 12:09  Mg     2.8         TPro  4.8<L>  /  Alb  1.7<L>  /  TBili  0.4  /  DBili  x   /  AST  10  /  ALT  <5  /  AlkPhos  163<H>    PT/INR - ( 30 May 2023 20:08 )   PT: 14.10 sec;   INR: 1.23 ratio         PTT - ( 30 May 2023 20:08 )  PTT:32.3 sec    Urinalysis Basic - ( 30 May 2023 21:15 )    Color: Jazmine / Appearance: Turbid / S.023 / pH: x  Gluc: x / Ketone: Trace  / Bili: Negative / Urobili: 6 mg/dL   Blood: x / Protein: 100 mg/dL / Nitrite: Negative   Leuk Esterase: Large / RBC: 355 /HPF /  /HPF   Sq Epi: x / Non Sq Epi: x / Bacteria: Negative      RADIOLOGY & ADDITIONAL STUDIES: reviewed by me  < from: US Kidney and Bladder (23 @ 00:33) >  IMPRESSION:    Normal renal ultrasound.    < end of copied text >      EKG: reviewed by me  < from: 12 Lead ECG (23 @ 18:59) >  Ventricular Rate 143 BPM    Atrial Rate 288 BPM    QRS Duration 90 ms    Q-T Interval 282 ms    QTC Calculation(Bazett) 435 ms    R Axis 11 degrees    T Axis -40 degrees    Diagnosis Line Atrial flutter with variable A-V block  Minimal voltage criteria for LVH, may be normal variant ( R in aVL )  Inferior infarct , age undetermined  Abnormal ECG    < end of copied text >      PROTEIN CALORIE MALNUTRITION PRESENT: [ ]mild [ ]moderate [ ]severe [ ]underweight [ ]morbid obesity  https://www.andeal.org/vault/2440/web/files/ONC/Table_Clinical%20Characteristics%20to%20Document%20Malnutrition-White%20JV%20et%20al%2020.pdf    Height (cm): 180.3 (10-04-22 @ 22:39)  Weight (kg): 102 (23 @ 06:26), 66.4 (10-04-22 @ 22:39)  BMI (kg/m2): 31.4 (23 @ 06:26), 20.4 (10-04-22 @ 22:39)    [ ]PPSV2 < or = to 30% [ ]significant weight loss  [ ]poor nutritional intake  [ ]anasarca      [ ]Artificial Nutrition      REFERRALS:   [ ]Chaplaincy  [ ]Hospice  [ ]Child Life  [ x]Social Work  [ ]Case management [ ]Holistic Therapy     Patient discussed with primary medical team MD  Palliative care education provided to patient and/or family  Patient and/or family assessed for spiritual and social needs    Goals of Care Document:

## 2023-05-31 NOTE — CONSULT NOTE ADULT - SUBJECTIVE AND OBJECTIVE BOX
Gastroenterology Consultation:    Patient is a 63y old  Male who presents with a chief complaint of Abnormal labs (31 May 2023 16:08)        Admitted on: 05-30-23      HPI:  62 yo M with  PMH of ICH (2021) s/p trach and PEG, HTN, HLD, T2DM, CAD s/p stents, Recurrent C diff, BIBEMS from [facility] for abnormal labs. Patient non-verbal at baseline - limited history. Per NH chart  have a BUN greater than 200 and creatinine of 4.3 on outpatient labs. Outpatient CXR also w/ new L sided pleural effusion. With EMS patient was also found to be in irregular rhythm, no known history of A-fib or a flutter.In ED patient had borderline BP, and was in Atrial fibrillation. He was started on amiodarone gtt. Labs showed , Cr. 4.1, Hb 6.6. Trops 1.8. He was given 1U PRBC, gastric lavage revealed coffee ground emesis w/no active bleeding. He was started on PPI drip  He was given IV aztreonam and vancomycin.   Patient being admitted to ICU for management of Sepsis likely from UTI, MICHELLE likely prerenal and NSTEMI. GI is consulted for evaluation.        Prior EGD: none on chart    Prior Colonoscopy: none on chart      PAST MEDICAL & SURGICAL HISTORY:  HTN (hypertension)      Diabetes mellitus      H/O intracranial hemorrhage      H/O tracheostomy      PEG (percutaneous endoscopic gastrostomy) status      Hyperlipidemia            FAMILY HISTORY:  reported negative for GI malignancy     Social History:  Tobacco: reported negative  Alcohol:  reported negative  Drugs: reported negative    Home Medications:  albuterol 2.5 mg/3 mL (0.083%) inhalation solution: 1 unit(s) inhaled every 4 hours, As Needed (04 Oct 2022 11:00)  ascorbic acid 500 mg oral tablet: 1 tab(s) orally once a day (31 May 2023 01:05)  aspirin 81 mg oral tablet, chewable: 1 tab(s) orally once a day (04 Oct 2022 11:00)  atorvastatin 80 mg oral tablet: 1 tab(s) orally once a day (04 Oct 2022 11:00)  Atrovent 18 mcg/inh inhalation aerosol: 1 unit(s) inhaled every 6 hours (04 Oct 2022 11:00)  Bactrim  mg-160 mg oral tablet: 1 tablet with sensor orally every 12 hours (31 May 2023 01:17)  chlorhexidine 0.12% mucous membrane liquid: 15 milliliter(s) buccal 2 times a day (31 May 2023 00:59)  doxazosin 4 mg oral tablet: 1 tab(s) orally once a day (at bedtime) (31 May 2023 01:00)  epoetin guanakito 40,000 units/mL injectable solution: 1 milliliter(s) injectable once a week (31 May 2023 01:04)  ergocalciferol 200 mcg/mL (8000 intl units/mL) oral solution: 0.25 milliliter(s) orally once a day (04 Oct 2022 11:00)  famotidine 10 mg oral tablet: 1 tab(s) orally 2 times a day (04 Oct 2022 11:00)  ferrous sulfate 220 mg/5 mL (44 mg/5 mL elemental iron) oral elixir: 7.5 milliliter(s) by gastrostomy tube once a day (04 Oct 2022 11:00)  folic acid 1 mg oral tablet: 1 tab(s) orally once a day (04 Oct 2022 11:00)  furosemide 40 mg oral tablet: 1 tab(s) orally once a day (31 May 2023 01:16)  imipenem-cilastatin 500 mg injection: 500 milligram(s) injectable every 6 hours (31 May 2023 01:17)  insulin lispro 100 units/mL injectable solution: injectable Sliding scale (31 May 2023 01:17)  Keppra 100 mg/mL oral solution: 5 milliliter(s) by gastrostomy tube 2 times a day (04 Oct 2022 11:00)  midodrine 5 mg oral tablet: 1 tab(s) orally 3 times a day (04 Oct 2022 11:00)  Multiple Vitamins oral tablet: 1 tab(s) orally once a day via PEG (04 Oct 2022 11:00)  SSD 1% topical cream: Apply topically to affected area every 12 hours (31 May 2023 01:17)  tamsulosin 0.4 mg oral capsule: 1 cap(s) orally once a day (at bedtime) (13 Oct 2022 10:24)  Tylenol 325 mg oral tablet: 2 tab(s) orally once a day (04 Oct 2022 11:00)  zinc sulfate 220 mg oral tablet: 1 tab(s) orally once a day (31 May 2023 01:16)        MEDICATIONS  (STANDING):  albuterol/ipratropium for Nebulization 3 milliLiter(s) Nebulizer every 6 hours  aMIOdarone Infusion 1 mG/Min (33.3 mL/Hr) IV Continuous <Continuous>  aMIOdarone Infusion 0.5 mG/Min (16.7 mL/Hr) IV Continuous <Continuous>  ascorbic acid 500 milliGRAM(s) Oral daily  aspirin  chewable 81 milliGRAM(s) Oral daily  atorvastatin 80 milliGRAM(s) Oral at bedtime  buMETAnide Injectable 2 milliGRAM(s) IV Push every 12 hours  cefepime   IVPB 1000 milliGRAM(s) IV Intermittent every 12 hours  chlorhexidine 0.12% Liquid 15 milliLiter(s) Oral Mucosa two times a day  dextrose 5%. 1000 milliLiter(s) (50 mL/Hr) IV Continuous <Continuous>  dextrose 5%. 1000 milliLiter(s) (100 mL/Hr) IV Continuous <Continuous>  dextrose 50% Injectable 25 Gram(s) IV Push once  dextrose 50% Injectable 25 Gram(s) IV Push once  dextrose 50% Injectable 12.5 Gram(s) IV Push once  ferrous    sulfate Liquid 300 milliGRAM(s) Enteral Tube daily  folic acid 1 milliGRAM(s) Oral daily  glucagon  Injectable 1 milliGRAM(s) IntraMuscular once  heparin   Injectable 5000 Unit(s) SubCutaneous every 12 hours  insulin lispro (ADMELOG) corrective regimen sliding scale   SubCutaneous three times a day before meals  insulin regular Infusion 10 Unit(s)/Hr (10 mL/Hr) IV Continuous <Continuous>  levETIRAcetam  Solution 500 milliGRAM(s) Oral two times a day  midodrine. 10 milliGRAM(s) Oral three times a day  multivitamin 1 Tablet(s) Oral daily  pantoprazole  Injectable 40 milliGRAM(s) IV Push every 12 hours  potassium chloride   Powder 40 milliEquivalent(s) Oral every 12 hours  silver sulfADIAZINE 1% Cream 1 Application(s) Topical every 12 hours  sodium bicarbonate 1300 milliGRAM(s) Oral every 8 hours  zinc sulfate 220 milliGRAM(s) Oral daily    MEDICATIONS  (PRN):  acetaminophen     Tablet .. 650 milliGRAM(s) Oral every 6 hours PRN Temp greater or equal to 38C (100.4F), Mild Pain (1 - 3)  dextrose Oral Gel 15 Gram(s) Oral once PRN Blood Glucose LESS THAN 70 milliGRAM(s)/deciliter  sodium chloride 0.9% Bolus. 100 milliLiter(s) IV Bolus every 5 minutes PRN SBP LESS THAN or EQUAL to 80 mmHg      Allergies  penicillin (Other)      Review of Systems:  limited as patient is non verbal           Physical Examination:  T(C): 35.7 (05-31-23 @ 08:35), Max: 35.9 (05-30-23 @ 20:30)  HR: 86 (05-31-23 @ 16:30) (80 - 147)  BP: 96/52 (05-31-23 @ 16:30) (85/53 - 104/59)  RR: 22 (05-31-23 @ 16:30) (14 - 24)  SpO2: 100% (05-31-23 @ 16:30) (99% - 100%)    Weight (kg): 102 (05-31-23 @ 06:26)    05-30-23 @ 07:01  -  05-31-23 @ 07:00  --------------------------------------------------------  IN: 0 mL / OUT: 50 mL / NET: -50 mL    05-31-23 @ 07:01  -  05-31-23 @ 18:18  --------------------------------------------------------  IN: 0 mL / OUT: 900 mL / NET: -900 mL          GENERAL: non verbal, trached   HEAD:  Atraumatic, Normocephalic  EYES: conjunctiva and sclera clear  NECK: Supple, no JVD or thyromegaly  CHEST/LUNG: Clear to auscultation bilaterally; No wheeze, rhonchi, or rales  HEART: Regular rate and rhythm; normal S1, S2, No murmurs.  ABDOMEN: Soft, nontender, nondistended; Bowel sounds present. PEG in place   NEUROLOGY: No asterixis or tremor.   SKIN: Intact, no jaundice        Data:                        6.7    9.00  )-----------( 370      ( 31 May 2023 05:50 )             20.3     Hgb Trend:  6.7  05-31-23 @ 05:50  6.6  05-30-23 @ 20:08        05-31    120<L>  |  80<L>  |  227<HH>  ----------------------------<  295<H>  3.3<L>   |  14<L>  |  4.4<HH>    Ca    7.5<L>      31 May 2023 12:09  Mg     2.8     05-31    TPro  4.8<L>  /  Alb  1.7<L>  /  TBili  0.4  /  DBili  x   /  AST  10  /  ALT  <5  /  AlkPhos  163<H>  05-31    Liver panel trend:  TBili 0.4   /   AST 10   /   ALT <5   /   AlkP 163   /   Tptn 4.8   /   Alb 1.7    /   DBili --      05-31  TBili 0.4   /   AST 12   /   ALT <5   /   AlkP 199   /   Tptn 5.7   /   Alb 1.9    /   DBili --      05-30      PT/INR - ( 30 May 2023 20:08 )   PT: 14.10 sec;   INR: 1.23 ratio         PTT - ( 30 May 2023 20:08 )  PTT:32.3 sec

## 2023-05-31 NOTE — ED ADULT NURSE REASSESSMENT NOTE - NS ED NURSE REASSESS COMMENT FT1
Tried to contact MD in regards to amiodarone order. Spoke with pharmacy. Waiting for response on stopping drip/starting oral. Tried to contact MD Lacy in regards to amiodarone order. Spoke with pharmacy. Waiting for response on stopping drip/starting oral.

## 2023-06-01 NOTE — PATIENT PROFILE ADULT - FALL HARM RISK - FALLEN IN PAST
Clinic Care Coordination Contact  RUST/Radha Wilcox, Social Work Care Coordinator, [facility], MSW  [facility]: [place], [place], [place], and [place]   P:[phone] / Signed January 5, 2018        No

## 2023-06-01 NOTE — PROGRESS NOTE ADULT - SUBJECTIVE AND OBJECTIVE BOX
HPI:  62 yo M with  PMH of ICH () s/p trach and PEG, HTN, HLD, T2DM, CAD s/p stents, Recurrent C diff, BIBEMS from [facility] for abnormal labs. Patient non-verbal at baseline - limited history. Per NH chart  have a BUN greater than 200 and creatinine of 4.3 on outpatient labs. Outpatient CXR also w/ new L sided pleural effusion. With EMS patient was also found to be in irregular rhythm, no known history of A-fib or a flutter.  In ED patient had borderline BP, and was in Atrial fibrillation. He was started on amiodarone gtt. Labs showed , Cr. 4.1, Hb 6.6. Trops 1.8. He was given 1U PRBC, gastric lavage revealed coffee ground emesis w/no active bleeding. He was started on PPI drip and GI consulted in ED. He was given IV aztreonam and vancomycin.   Patient being admitted to ICU for management of Sepsis likely from UTI, MICHELLE likely prerenal and NSTEMI.     Interval History  -Patient seen at bedside  -He was unable to participate in exam    ADVANCE DIRECTIVES:    [ ] Full Code [x ] DNR  MOLST  [ ]  Living Will  [ ]   DECISION MAKER(s):  [ ] Health Care Proxy(s)  [x ] Surrogate(s)  [ ] Guardian           Name(s): Phone Number(s):  Wife Sandrita Bauer    BASELINE (I)ADL(s) (prior to admission):  [place]: [ x]Total  [ ] Moderate [ ]Dependent  Palliative Performance Status Version 2:         20%    http://npcrc.org/files/news/palliative_performance_scale_ppsv2.pdf    Allergies    penicillin (Other)    MEDICATIONS  (STANDING):  albuterol/ipratropium for Nebulization 3 milliLiter(s) Nebulizer every 6 hours  aMIOdarone    Tablet 400 milliGRAM(s) Oral every 8 hours  aMIOdarone    Tablet   Oral   ascorbic acid 500 milliGRAM(s) Oral daily  aspirin  chewable 81 milliGRAM(s) Oral daily  atorvastatin 80 milliGRAM(s) Oral at bedtime  buMETAnide Injectable 2 milliGRAM(s) IV Push every 12 hours  cefepime   IVPB 1000 milliGRAM(s) IV Intermittent every 12 hours  chlorhexidine 0.12% Liquid 15 milliLiter(s) Oral Mucosa two times a day  chlorhexidine 2% Cloths 1 Application(s) Topical <User Schedule>  dextrose 5%. 1000 milliLiter(s) (50 mL/Hr) IV Continuous <Continuous>  dextrose 5%. 1000 milliLiter(s) (100 mL/Hr) IV Continuous <Continuous>  dextrose 50% Injectable 25 Gram(s) IV Push once  dextrose 50% Injectable 25 Gram(s) IV Push once  dextrose 50% Injectable 12.5 Gram(s) IV Push once  ferrous    sulfate Liquid 300 milliGRAM(s) Enteral Tube daily  folic acid 1 milliGRAM(s) Oral daily  glucagon  Injectable 1 milliGRAM(s) IntraMuscular once  heparin   Injectable 5000 Unit(s) SubCutaneous every 12 hours  insulin lispro (ADMELOG) corrective regimen sliding scale   SubCutaneous three times a day before meals  levETIRAcetam  Solution 500 milliGRAM(s) Oral two times a day  lidocaine 2% Injectable 20 milliLiter(s) Local Injection once  midodrine. 10 milliGRAM(s) Oral three times a day  multivitamin 1 Tablet(s) Oral daily  pantoprazole    Tablet 40 milliGRAM(s) Oral before breakfast  silver sulfADIAZINE 1% Cream 1 Application(s) Topical every 12 hours  sodium bicarbonate 1300 milliGRAM(s) Oral every 8 hours  zinc sulfate 220 milliGRAM(s) Oral daily    MEDICATIONS  (PRN):  acetaminophen     Tablet .. 650 milliGRAM(s) Oral every 6 hours PRN Temp greater or equal to 38C (100.4F), Mild Pain (1 - 3)  dextrose Oral Gel 15 Gram(s) Oral once PRN Blood Glucose LESS THAN 70 milliGRAM(s)/deciliter  sodium chloride 0.9% Bolus. 100 milliLiter(s) IV Bolus every 5 minutes PRN SBP LESS THAN or EQUAL to 80 mmHg    PRESENT SYMPTOMS: [x ]Unable to obtain due to poor mentation   Source if other than patient:  [ ]Family   [ ]Team     Pain: [ ]yes [ ]no  QOL impact -   Location -                    Aggravating factors -  Quality -  Radiation -  Timing-  Severity (0-10 scale):  Minimal acceptable level (0-10 scale):     CPOT:  1  https://www.[email or web address]/getattachment/[email or web address]/Critical-Care-Pain-Observation-Tool-(CPOT)    PAIN AD Score:   http://geriatrictoolkit.[facility]/cog/painad.pdf (press ctrl +  left click to view)    Dyspnea:                           [ ]None[ ]Mild [ ]Moderate [ ]Severe     Respiratory Distress Observation Scale (RDOS): 2  A score of 0 to 2 signifies little or no respiratory distress, 3 signifies mild distress, scores 4 to 6 indicate moderate distress, and scores greater than 7 signify severe distress  [email or web address]/[email or web address]/[email or web address]-kxjdbymkalx-iytjpsny-zkxmzbpsaxl-[email or web address].pdf    Anxiety:                             [ ]None[ ]Mild [ ]Moderate [ ]Severe   Fatigue:                             [ ]None[ ]Mild [ ]Moderate [ ]Severe   Nausea:                             [ ]None[ ]Mild [ ]Moderate [ ]Severe   Loss of appetite:              [ ]None[ ]Mild [ ]Moderate [ ]Severe   Constipation:                    [ ]None[ ]Mild [ ]Moderate [ ]Severe    Other Symptoms:  [ ]All other review of systems negative     Palliative Performance Status Version 2:         10%    http://npcrc.org/files/news/palliative_performance_scale_ppsv2.pdf    PHYSICAL EXAM:  Vital Signs Last 24 Hrs  T(C): 35.6 (2023 11:45), Max: 36.1 (2023 04:00)  T(F): 96.1 (2023 11:45), Max: 97 (2023 04:00)  HR: 80 (2023 15:30) (74 - 91)  BP: 101/54 (2023 15:30) (87/51 - 116/80)  BP(mean): 66 (2023 11:45) (64 - 76)  RR: 21 (2023 15:30) (16 - 21)  SpO2: 100% (2023 15:30) (99% - 100%)    Parameters below as of 2023 15:30  Patient On (Oxygen Delivery Method): ventilator      GENERAL:  [ ] No acute distress [ ]Lethargic  [x ]Unarousable  [ ]Verbal  [ ]Non-Verbal [ ]Cachexia    BEHAVIORAL/PSYCH:  [ ]Alert and Oriented x  [ ] Anxiety [ ] Delirium [ ] Agitation [ x] Calm   EYES: [ ] No scleral icterus [ ] Scleral icterus [x ] Closed  ENMT:  [ ]Dry mouth  [ x]No external oral lesions [ ] No external ear or nose lesions  CARDIOVASCULAR:  [ ]Regular [ ]Irregular [x ]Tachy [ ]Not Tachy  [ ]Bridger [ ] Edema [ ] No edema  PULMONARY:  [ ]Tachypnea  [ ]Audible excessive secretions [ x] No labored breathing [ ] labored breathing  GASTROINTESTINAL: [ ]Soft  [ ]Distended  [x ]Not distended [ ]Non tender [ ]Tender  MUSCULOSKELETAL: [ ]No clubbing [ ] clubbing  [ x] No cyanosis [ ] cyanosis  NEUROLOGIC: [ ]No focal deficits  [ ]Follows commands  [x ]Does not follow commands  [ ]Cognitive impairment  [ ]Dysphagia  [ ]Dysarthria  [ ]Paresis   SKIN: [ ] Jaundiced [ x] Non-jaundiced [ ]Rash [ ]No Rash [ ] Warm [ ] Dry  MISC/LINES: [ ] ET tube [ x] Trach [ ]NGT/OGT [x ]PEG [ ]Barney    CRITICAL CARE:  [ ] Shock Present  [ ]Septic [ ]Cardiogenic [ ]Neurologic [ ]Hypovolemic  [ ]  Vasopressors [ ]  Inotropes   [x ]Respiratory failure present [ ]Mechanical ventilation [ ]Non-invasive ventilatory support [ ]High flow  [ ]Acute  [ ]Chronic [ ]Hypoxic  [ ]Hypercarbic [ ]Other  [ ]Other organ failure     LABS: reviewed by me                          9.9    13.27 )-----------( 403      ( 2023 07:17 )             30.3       06-01    119<LL>  |  81<L>  |  241<HH>  ----------------------------<  123<H>  4.8   |  12<L>  |  4.6<HH>    Ca    7.6<L>      2023 07:17  Phos  3.1     06-  Mg     2.8     06-    TPro  5.4<L>  /  Alb  1.5<L>  /  TBili  0.5  /  DBili  x   /  AST  24  /  ALT  <5  /  AlkPhos  157<H>  06-          ABG - ( 2023 11:54 )  pH, Arterial: 7.39  pH, Blood: x     /  pCO2: 26    /  pO2: 130   / HCO3: 16    / Base Excess: -8.1  /  SaO2: 99.6                Urinalysis Basic - ( 30 May 2023 21:15 )    Color: Jazmine / Appearance: Turbid / S.023 / pH: x  Gluc: x / Ketone: Trace  / Bili: Negative / Urobili: 6 mg/dL   Blood: x / Protein: 100 mg/dL / Nitrite: Negative   Leuk Esterase: Large / RBC: 355 /HPF /  /HPF   Sq Epi: x / Non Sq Epi: x / Bacteria: Negative        PT/INR - ( 2023 12:04 )   PT: 16.10 sec;   INR: 1.40 ratio         PTT - ( 2023 12:04 )  PTT:34.5 sec    CARDIAC MARKERS ( 2023 07:17 )  x     / 1.55 ng/mL / x     / x     / x      CARDIAC MARKERS ( 31 May 2023 05:50 )  x     / 1.57 ng/mL / x     / x     / x      CARDIAC MARKERS ( 31 May 2023 03:05 )  x     / 1.63 ng/mL / x     / x     / x      CARDIAC MARKERS ( 30 May 2023 20:08 )  x     / 1.80 ng/mL / x     / x     / x            CAPILLARY BLOOD GLUCOSE      POCT Blood Glucose.: 216 mg/dL (2023 11:30)              RADIOLOGY & ADDITIONAL STUDIES: reviewed by me  < from: Xray Chest 1 View- PORTABLE-Routine (Xray Chest 1 View- PORTABLE-Routine in AM.) (23 @ 07:48) >  Impression:    Slight increase left pleural effusion/left lung opacities. Stable right   lung opacities. No pneumothorax    < end of copied text >      EKG: reviewed by me  < from: 12 Lead ECG (23 @ 02:11) >  Ventricular Rate 85 BPM    Atrial Rate 85 BPM    P-R Interval 96 ms    QRS Duration 94 ms    Q-T Interval 358 ms    QTC Calculation(Bazett) 426 ms    P Axis 46 degrees    R Axis 15 degrees    T Axis 20 degrees    Diagnosis Line Sinus rhythm with short NV  Otherwise normal ECG    < end of copied text >        PROTEIN CALORIE MALNUTRITION PRESENT: [ ]mild [ ]moderate [ ]severe [ ]underweight [ ]morbid obesity  https://www.andeal.org/vault/8540/web/files/ONC/Table_Clinical%20Characteristics%20to%20Document%20Malnutrition-White%20JV%20et%20al%2020.pdf    Height (cm): 180.3 (10-04-22 @ 22:39)  Weight (kg): 102 (23 @ 06:26), 66.4 (10-04-22 @ 22:39)  BMI (kg/m2): 31.4 (23 @ 06:26), 20.4 (10-04-22 @ 22:39)    [ ]PPSV2 < or = to 30% [ ]significant weight loss  [ ]poor nutritional intake  [ ]anasarca      [ ]Artificial Nutrition      REFERRALS:   [ ]Chaplaincy  [ ]Hospice  [ ]Child Life  [ x]Social Work  [ ]Case management [ ]Holistic Therapy     Patient discussed with primary medical team MD  Palliative care education provided to patient and/or family  Patient and/or family assessed for spiritual and social needs

## 2023-06-01 NOTE — PROGRESS NOTE ADULT - SUBJECTIVE AND OBJECTIVE BOX
Nephrology progress note    Patient is seen and examined, events over the last 24 h noted .    Allergies:  penicillin (Other)    Hospital Medications:   MEDICATIONS  (STANDING):  albuterol/ipratropium for Nebulization 3 milliLiter(s) Nebulizer every 6 hours  aMIOdarone    Tablet 400 milliGRAM(s) Oral every 8 hours  aMIOdarone    Tablet   Oral   ascorbic acid 500 milliGRAM(s) Oral daily  aspirin  chewable 81 milliGRAM(s) Oral daily  atorvastatin 80 milliGRAM(s) Oral at bedtime  buMETAnide Injectable 2 milliGRAM(s) IV Push every 12 hours  cefepime   IVPB 1000 milliGRAM(s) IV Intermittent every 12 hours  chlorhexidine 0.12% Liquid 15 milliLiter(s) Oral Mucosa two times a day  chlorhexidine 2% Cloths 1 Application(s) Topical <User Schedule>  dextrose 5%. 1000 milliLiter(s) (100 mL/Hr) IV Continuous <Continuous>  dextrose 5%. 1000 milliLiter(s) (50 mL/Hr) IV Continuous <Continuous>  dextrose 50% Injectable 25 Gram(s) IV Push once  dextrose 50% Injectable 25 Gram(s) IV Push once  dextrose 50% Injectable 12.5 Gram(s) IV Push once  ferrous    sulfate Liquid 300 milliGRAM(s) Enteral Tube daily  folic acid 1 milliGRAM(s) Oral daily  glucagon  Injectable 1 milliGRAM(s) IntraMuscular once  heparin   Injectable 5000 Unit(s) SubCutaneous every 12 hours  insulin lispro (ADMELOG) corrective regimen sliding scale   SubCutaneous three times a day before meals  insulin regular Infusion 10 Unit(s)/Hr (10 mL/Hr) IV Continuous <Continuous>  levETIRAcetam  Solution 500 milliGRAM(s) Oral two times a day  lidocaine 2% Injectable 20 milliLiter(s) Local Injection once  midodrine. 10 milliGRAM(s) Oral three times a day  multivitamin 1 Tablet(s) Oral daily  pantoprazole  Injectable 40 milliGRAM(s) IV Push every 12 hours  silver sulfADIAZINE 1% Cream 1 Application(s) Topical every 12 hours  sodium bicarbonate 1300 milliGRAM(s) Oral every 8 hours  zinc sulfate 220 milliGRAM(s) Oral daily        VITALS:  T(F): 96.1 (23 @ 11:45), Max: 97 (23 @ 04:00)  HR: 84 (23 @ 11:45)  BP: 94/51 (23 @ 11:45)  RR: 20 (23 @ 11:45)  SpO2: 100% (23 @ 11:45)  Wt(kg): --     @ 07:01  -   @ 07:00  --------------------------------------------------------  IN: 0 mL / OUT: 50 mL / NET: -50 mL     @ 07:01  -   @ 07:00  --------------------------------------------------------  IN: 30 mL / OUT: 900 mL / NET: -870 mL     @ 07:  -   @ 13:15  --------------------------------------------------------  IN: 600 mL / OUT: 0 mL / NET: 600 mL      Height (cm): 170.2 ( @ 09:07)    PHYSICAL EXAM:  Constitutional: NAD  HEENT: anicteric sclera, oropharynx clear, MMM  Neck: No JVD  Respiratory: CTAB, no wheezes, rales or rhonchi  Cardiovascular: S1, S2, RRR  Gastrointestinal: BS+, soft, NT/ND  Extremities: No cyanosis or clubbing. No peripheral edema  Neurological: A/O x 3, no focal deficits  : No CVA tenderness. No teixeira.   Skin: No rashes  Vascular Access:    LABS:      119<LL>  |  81<L>  |  241<HH>  ----------------------------<  123<H>  4.8   |  12<L>  |  4.6<HH>  Creatinine Trend: 4.6<--, 4.4<--, 4.1<--, 4.1<--, 4.1<--    SODIUM TREND:  Sodium 119 [ @ 07:17]  Sodium 120 [ @ 12:09]  Sodium 120 [ @ 05:50]  Sodium 117 [ @ 03:05]  Sodium 119 [ @ 20:08]    Ca    7.6<L>      2023 07:17  Phos  3.1       Mg     2.8         TPro  5.4<L>  /  Alb  1.5<L>  /  TBili  0.5  /  DBili      /  AST  24  /  ALT  <5  /  AlkPhos  157<H>                            9.9    13.27 )-----------( 403      ( 2023 07:17 )             30.3       Urine Studies:  Urinalysis Basic - ( 30 May 2023 21:15 )    Color: Jazmine / Appearance: Turbid / S.023 / pH:   Gluc:  / Ketone: Trace  / Bili: Negative / Urobili: 6 mg/dL   Blood:  / Protein: 100 mg/dL / Nitrite: Negative   Leuk Esterase: Large / RBC: 355 /HPF /  /HPF   Sq Epi:  / Non Sq Epi:  / Bacteria: Negative      Sodium, Random Urine: 96.0 mmoL/L ( @ 05:50)  Osmolality, Random Urine: 347 mos/kg ( @ 05:50)  Creatinine, Random Urine: 10 mg/dL ( @ 05:50)    RADIOLOGY & ADDITIONAL STUDIES:  < from: Xray Chest 1 View- PORTABLE-Routine (Xray Chest 1 View- PORTABLE-Routine in AM.) (23 @ 07:48) >  Impression:    Slight increase left pleural effusion/left lung opacities. Stable right   lung opacities. No pneumothorax    < end of copied text >   Nephrology progress note    Patient is seen and examined, events over the last 24 h noted .  62 yo M with  PMH of ICH () s/p trach and PEG, HTN, HLD, T2DM, CAD s/p stents, Recurrent C diff, BIBEMS from [facility] for abnormal labs. Patient non-verbal at baseline - limited history. Per NH chart  have a BUN greater than 200 and creatinine of 4.3 on outpatient labs. Outpatient CXR also w/ new L sided pleural effusion. With EMS patient was also found to be in irregular rhythm, no known history of A-fib or a flutter.  In ED patient had borderline BP, and was in Atrial fibrillation. He was started on amiodarone gtt. Labs showed , Cr. 4.1, Hb 6.6. Trops 1.8. He was given 1U PRBC, gastric lavage revealed coffee ground emesis w/no active bleeding. He was started on PPI drip and GI consulted in ED. He was given IV aztreonam and vancomycin.   Patient being admitted to ICU for management of Sepsis likely from UTI, PATRICK likely prerenal and NSTEMI.     Renal was called for Patrick  Allergies:  penicillin (Other)    Hospital Medications:   MEDICATIONS  (STANDING):  albuterol/ipratropium for Nebulization 3 milliLiter(s) Nebulizer every 6 hours  aMIOdarone    Tablet 400 milliGRAM(s) Oral every 8 hours  aMIOdarone    Tablet   Oral   ascorbic acid 500 milliGRAM(s) Oral daily  aspirin  chewable 81 milliGRAM(s) Oral daily  atorvastatin 80 milliGRAM(s) Oral at bedtime  buMETAnide Injectable 2 milliGRAM(s) IV Push every 12 hours  cefepime   IVPB 1000 milliGRAM(s) IV Intermittent every 12 hours  chlorhexidine 0.12% Liquid 15 milliLiter(s) Oral Mucosa two times a day  chlorhexidine 2% Cloths 1 Application(s) Topical <User Schedule>  dextrose 5%. 1000 milliLiter(s) (100 mL/Hr) IV Continuous <Continuous>  dextrose 5%. 1000 milliLiter(s) (50 mL/Hr) IV Continuous <Continuous>  dextrose 50% Injectable 25 Gram(s) IV Push once  dextrose 50% Injectable 25 Gram(s) IV Push once  dextrose 50% Injectable 12.5 Gram(s) IV Push once  ferrous    sulfate Liquid 300 milliGRAM(s) Enteral Tube daily  folic acid 1 milliGRAM(s) Oral daily  glucagon  Injectable 1 milliGRAM(s) IntraMuscular once  heparin   Injectable 5000 Unit(s) SubCutaneous every 12 hours  insulin lispro (ADMELOG) corrective regimen sliding scale   SubCutaneous three times a day before meals  insulin regular Infusion 10 Unit(s)/Hr (10 mL/Hr) IV Continuous <Continuous>  levETIRAcetam  Solution 500 milliGRAM(s) Oral two times a day  lidocaine 2% Injectable 20 milliLiter(s) Local Injection once  midodrine. 10 milliGRAM(s) Oral three times a day  multivitamin 1 Tablet(s) Oral daily  pantoprazole  Injectable 40 milliGRAM(s) IV Push every 12 hours  silver sulfADIAZINE 1% Cream 1 Application(s) Topical every 12 hours  sodium bicarbonate 1300 milliGRAM(s) Oral every 8 hours  zinc sulfate 220 milliGRAM(s) Oral daily        VITALS:  T(F): 96.1 (23 @ 11:45), Max: 97 (23 @ 04:00)  HR: 84 (23 @ 11:45)  BP: 94/51 (23 @ 11:45)  RR: 20 (23 @ 11:45)  SpO2: 100% (23 @ 11:45)  Wt(kg): --     @ 07:  -   @ 07:00  --------------------------------------------------------  IN: 0 mL / OUT: 50 mL / NET: -50 mL     @ 07:01  -   @ 07:00  --------------------------------------------------------  IN: 30 mL / OUT: 900 mL / NET: -870 mL     @ 07:01  -   @ 13:15  --------------------------------------------------------  IN: 600 mL / OUT: 0 mL / NET: 600 mL      Height (cm): 170.2 ( @ 09:07)    PHYSICAL EXAM:  Constitutional: NAD  Neck: No JVD  Respiratory: CTA  Cardiovascular: S1, S2, RRR  Gastrointestinal: BS+, soft, NT/ND  Extremities: No peripheral edema  Neurological: opens eyes  : has teixeira.   Skin: No rashes  Vascular Access: rt ij udall cath    LABS:      119<LL>  |  81<L>  |  241<HH>  ----------------------------<  123<H>  4.8   |  12<L>  |  4.6<HH>  Creatinine Trend: 4.6<--, 4.4<--, 4.1<--, 4.1<--, 4.1<--    SODIUM TREND:  Sodium 119 [ @ 07:17]  Sodium 120 [ @ 12:09]  Sodium 120 [ @ 05:50]  Sodium 117 [ @ 03:05]  Sodium 119 [ @ 20:08]    Ca    7.6<L>      2023 07:17  Phos  3.1       Mg     2.8         TPro  5.4<L>  /  Alb  1.5<L>  /  TBili  0.5  /  DBili      /  AST  24  /  ALT  <5  /  AlkPhos  157<H>                            9.9    13.27 )-----------( 403      ( 2023 07:17 )             30.3       Urine Studies:  Urinalysis Basic - ( 30 May 2023 21:15 )    Color: Jazmine / Appearance: Turbid / S.023 / pH:   Gluc:  / Ketone: Trace  / Bili: Negative / Urobili: 6 mg/dL   Blood:  / Protein: 100 mg/dL / Nitrite: Negative   Leuk Esterase: Large / RBC: 355 /HPF /  /HPF   Sq Epi:  / Non Sq Epi:  / Bacteria: Negative      Sodium, Random Urine: 96.0 mmoL/L ( @ 05:50)  Osmolality, Random Urine: 347 mos/kg ( @ 05:50)  Creatinine, Random Urine: 10 mg/dL ( @ 05:50)    RADIOLOGY & ADDITIONAL STUDIES:  < from: Xray Chest 1 View- PORTABLE-Routine (Xray Chest 1 View- PORTABLE-Routine in AM.) (23 @ 07:48) >  Impression:    Slight increase left pleural effusion/left lung opacities. Stable right   lung opacities. No pneumothorax    < end of copied text >

## 2023-06-01 NOTE — PROGRESS NOTE ADULT - SUBJECTIVE AND OBJECTIVE BOX
Over Night Events: events noted, vent dependant, cardio/ renal/ palliative/ GI noted, on d5  cc/h, afebrile    PHYSICAL EXAM    ICU Vital Signs Last 24 Hrs  T(C): 35.7 (31 May 2023 08:35), Max: 35.7 (31 May 2023 08:35)  T(F): 96.2 (31 May 2023 08:35), Max: 96.2 (31 May 2023 08:35)  HR: 76 (2023 03:00) (76 - 91)  BP: 90/51 (2023 03:00) (90/51 - 106/55)  RR: 22 (31 May 2023 16:30) (14 - 24)  SpO2: 100% (2023 03:00) (99% - 100%)    O2 Parameters below as of 2023 03:00  Patient On (Oxygen Delivery Method): ventilator            General: chronically ill looking  HEENT: trach          Lungs: Bilateral rhonchi  Cardiovascular: Regular   Abdomen: Soft, Positive BS  unresponsive      23 @ 07:01  -  23 @ 07:00  --------------------------------------------------------  IN:  Total IN: 0 mL    OUT:    Indwelling Catheter - Urethral (mL): 50 mL  Total OUT: 50 mL    Total NET: -50 mL      23 @ 07:  -  23 @ 06:31  --------------------------------------------------------  IN:    Jevity 1.2: 30 mL  Total IN: 30 mL    OUT:    Voided (mL): 900 mL  Total OUT: 900 mL    Total NET: -870 mL          LABS:                          6.7    9.00  )-----------( 370      ( 31 May 2023 05:50 )             20.3                                                   120<L>  |  80<L>  |  227<HH>  ----------------------------<  295<H>  3.3<L>   |  14<L>  |  4.4<HH>    Ca    7.5<L>      31 May 2023 12:09  Mg     2.8         TPro  4.8<L>  /  Alb  1.7<L>  /  TBili  0.4  /  DBili  x   /  AST  10  /  ALT  <5  /  AlkPhos  163<H>        PT/INR - ( 30 May 2023 20:08 )   PT: 14.10 sec;   INR: 1.23 ratio         PTT - ( 30 May 2023 20:08 )  PTT:32.3 sec                                       Urinalysis Basic - ( 30 May 2023 21:15 )    Color: Jazmine / Appearance: Turbid / S.023 / pH: x  Gluc: x / Ketone: Trace  / Bili: Negative / Urobili: 6 mg/dL   Blood: x / Protein: 100 mg/dL / Nitrite: Negative   Leuk Esterase: Large / RBC: 355 /HPF /  /HPF   Sq Epi: x / Non Sq Epi: x / Bacteria: Negative        CARDIAC MARKERS ( 31 May 2023 05:50 )  x     / 1.57 ng/mL / x     / x     / x      CARDIAC MARKERS ( 31 May 2023 03:05 )  x     / 1.63 ng/mL / x     / x     / x      CARDIAC MARKERS ( 30 May 2023 20:08 )  x     / 1.80 ng/mL / x     / x     / x                                                LIVER FUNCTIONS - ( 31 May 2023 05:50 )  Alb: 1.7 g/dL / Pro: 4.8 g/dL / ALK PHOS: 163 U/L / ALT: <5 U/L / AST: 10 U/L / GGT: x                                                  Culture - Blood (collected 30 May 2023 20:09)  Source: .Blood Blood-Peripheral  Preliminary Report (2023 02:01):    No growth to date.    Culture - Blood (collected 30 May 2023 20:09)  Source: .Blood Blood-Peripheral  Preliminary Report (2023 02:01):    No growth to date.                                                   Mode: AC/ CMV (Assist Control/ Continuous Mandatory Ventilation)  RR (machine): 14  TV (machine): 500  FiO2: 60  PEEP: 8  ITime: 1  MAP: 17  PIP: 28                                      ABG - ( 31 May 2023 23:55 )  pH, Arterial: 7.43  pH, Blood: x     /  pCO2: 24    /  pO2: 233   / HCO3: 16    / Base Excess: -7.2  /  SaO2: 99.5                MEDICATIONS  (STANDING):  albuterol/ipratropium for Nebulization 3 milliLiter(s) Nebulizer every 6 hours  aMIOdarone    Tablet   Oral   aMIOdarone    Tablet 400 milliGRAM(s) Oral every 8 hours  ascorbic acid 500 milliGRAM(s) Oral daily  aspirin  chewable 81 milliGRAM(s) Oral daily  atorvastatin 80 milliGRAM(s) Oral at bedtime  buMETAnide Injectable 2 milliGRAM(s) IV Push every 12 hours  cefepime   IVPB 1000 milliGRAM(s) IV Intermittent every 12 hours  chlorhexidine 0.12% Liquid 15 milliLiter(s) Oral Mucosa two times a day  dextrose 5% + sodium chloride 0.9% with potassium chloride 20 mEq/L 1000 milliLiter(s) (150 mL/Hr) IV Continuous <Continuous>  dextrose 5%. 1000 milliLiter(s) (50 mL/Hr) IV Continuous <Continuous>  dextrose 5%. 1000 milliLiter(s) (100 mL/Hr) IV Continuous <Continuous>  dextrose 50% Injectable 25 Gram(s) IV Push once  dextrose 50% Injectable 25 Gram(s) IV Push once  dextrose 50% Injectable 12.5 Gram(s) IV Push once  ferrous    sulfate Liquid 300 milliGRAM(s) Enteral Tube daily  folic acid 1 milliGRAM(s) Oral daily  glucagon  Injectable 1 milliGRAM(s) IntraMuscular once  heparin   Injectable 5000 Unit(s) SubCutaneous every 12 hours  insulin lispro (ADMELOG) corrective regimen sliding scale   SubCutaneous three times a day before meals  insulin regular Infusion 10 Unit(s)/Hr (10 mL/Hr) IV Continuous <Continuous>  levETIRAcetam  Solution 500 milliGRAM(s) Oral two times a day  midodrine. 10 milliGRAM(s) Oral three times a day  multivitamin 1 Tablet(s) Oral daily  pantoprazole  Injectable 40 milliGRAM(s) IV Push every 12 hours  silver sulfADIAZINE 1% Cream 1 Application(s) Topical every 12 hours  sodium bicarbonate 1300 milliGRAM(s) Oral every 8 hours  zinc sulfate 220 milliGRAM(s) Oral daily    MEDICATIONS  (PRN):  acetaminophen     Tablet .. 650 milliGRAM(s) Oral every 6 hours PRN Temp greater or equal to 38C (100.4F), Mild Pain (1 - 3)  dextrose Oral Gel 15 Gram(s) Oral once PRN Blood Glucose LESS THAN 70 milliGRAM(s)/deciliter

## 2023-06-01 NOTE — PROGRESS NOTE ADULT - ASSESSMENT
IMPRESSION:    Sepsis on admission  UTI  MICHELLE,   NSTEMI likely type 2  Hyponatremia  Paroxysmal Afib  Acute on chronic anemia suspected UGI Bleed  Chronic respiratory failure  H/o ICH s/p trach and PEG  H/o CAD      PLAN:    CNS: Continue home meds. Sedate to synchrony with ventilator as needed    HEENT: Oral and trach care    PULMONARY: Obtain ABG and adjust vent settings. Goal saturation 92-95%. HOB @ 45 degrees. monitor P/P/ DP, dec FIO2 TO 40%    CARDIOVASCULAR: Avoid overload. Obtain echo. bumex    GI: start feeds and dc IVF    RENAL: renal fup, ? RRT, trend CMP    INFECTIOUS DISEASE: Follow up blood and urine cultures.ABX    HEMATOLOGICAL: Trend CMP, LE doppler    ENDOCRINE: Follow up FS. Insulin protocol if needed.    MUSCULOSKELETAL: Bedrest.    CODE STATUS: DNR    DISPO: SDU  very poor prognosis

## 2023-06-01 NOTE — ED ADULT NURSE REASSESSMENT NOTE - NS ED NURSE REASSESS COMMENT FT1
As per MD, Jevity tube feed initiated at 10mL/hr at 1:00 AM. Tube feed discontinued at 2:50 AM as per MD Hernández.

## 2023-06-01 NOTE — CONSULT NOTE ADULT - ASSESSMENT
64 yo M with  PMH of ICH (2021) s/p trach and PEG, HTN, HLD, T2DM, CAD s/p stents, Recurrent C diff, BIBEMS from [facility] for abnormal labs. Patient non-verbal at baseline - limited history. Per NH chart  have a BUN greater than 200 and creatinine of 4.3 on outpatient labs. Outpatient CXR also w/ new L sided pleural effusion. With EMS patient was also found to be in irregular rhythm, no known history of A-fib or a flutter.  In ED patient had borderline BP, and was in Atrial fibrillation. He was started on amiodarone gtt. Labs showed , Cr. 4.1, Hb 6.6. Trops 1.8. He was given 1U PRBC, gastric lavage revealed coffee ground emesis w/no active bleeding. MICHELLE    Vascular surgery called to place a temporary catheter for HD    - I reviewed today's labs  - I reviewed and personally visualized all the radiology imagings  - I discussed the plan with attending Dr. Espinoza:  - re-check coags  - will place a line today pending INR    SPECTRA [id number]

## 2023-06-01 NOTE — PATIENT PROFILE ADULT - FALL HARM RISK - HARM RISK INTERVENTIONS

## 2023-06-01 NOTE — CONSULT NOTE ADULT - SUBJECTIVE AND OBJECTIVE BOX
VASCULAR SURGERY CONSULT NOTE      HPI:  64 yo M with  PMH of ICH () s/p trach and PEG, HTN, HLD, T2DM, CAD s/p stents, Recurrent C diff, BIBEMS from [facility] for abnormal labs. Patient non-verbal at baseline - limited history. Per NH chart  have a BUN greater than 200 and creatinine of 4.3 on outpatient labs. Outpatient CXR also w/ new L sided pleural effusion. With EMS patient was also found to be in irregular rhythm, no known history of A-fib or a flutter.  In ED patient had borderline BP, and was in Atrial fibrillation. He was started on amiodarone gtt. Labs showed , Cr. 4.1, Hb 6.6. Trops 1.8. He was given 1U PRBC, gastric lavage revealed coffee ground emesis w/no active bleeding. He was started on PPI drip and GI consulted in ED. He was given IV aztreonam and vancomycin.   Patient being admitted to ICU for management of Sepsis likely from UTI, MICHELLE likely prerenal and NSTEMI.        (30 May 2023 23:39)        PAST MEDICAL & SURGICAL HISTORY:  HTN (hypertension)      Diabetes mellitus      H/O intracranial hemorrhage      H/O tracheostomy      PEG (percutaneous endoscopic gastrostomy) status      Hyperlipidemia        penicillin (Other)    Home Medications:  albuterol 2.5 mg/3 mL (0.083%) inhalation solution: 1 unit(s) inhaled every 4 hours, As Needed (04 Oct 2022 11:00)  ascorbic acid 500 mg oral tablet: 1 tab(s) orally once a day (31 May 2023 01:05)  aspirin 81 mg oral tablet, chewable: 1 tab(s) orally once a day (04 Oct 2022 11:00)  atorvastatin 80 mg oral tablet: 1 tab(s) orally once a day (04 Oct 2022 11:00)  Atrovent 18 mcg/inh inhalation aerosol: 1 unit(s) inhaled every 6 hours (04 Oct 2022 11:00)  Bactrim  mg-160 mg oral tablet: 1 tablet with sensor orally every 12 hours (31 May 2023 01:17)  chlorhexidine 0.12% mucous membrane liquid: 15 milliliter(s) buccal 2 times a day (31 May 2023 00:59)  doxazosin 4 mg oral tablet: 1 tab(s) orally once a day (at bedtime) (31 May 2023 01:00)  epoetin [place] 40,000 units/mL injectable solution: 1 milliliter(s) injectable once a week (31 May 2023 01:04)  ergocalciferol 200 mcg/mL (8000 intl units/mL) oral solution: 0.25 milliliter(s) orally once a day (04 Oct 2022 11:00)  famotidine 10 mg oral tablet: 1 tab(s) orally 2 times a day (04 Oct 2022 11:00)  ferrous sulfate 220 mg/5 mL (44 mg/5 mL elemental iron) oral elixir: 7.5 milliliter(s) by gastrostomy tube once a day (04 Oct 2022 11:00)  folic acid 1 mg oral tablet: 1 tab(s) orally once a day (04 Oct 2022 11:00)  furosemide 40 mg oral tablet: 1 tab(s) orally once a day (31 May 2023 01:16)  imipenem-cilastatin 500 mg injection: 500 milligram(s) injectable every 6 hours (31 May 2023 01:17)  insulin lispro 100 units/mL injectable solution: injectable Sliding scale (31 May 2023 01:17)  Keppra 100 mg/mL oral solution: 5 milliliter(s) by gastrostomy tube 2 times a day (04 Oct 2022 11:00)  midodrine 5 mg oral tablet: 1 tab(s) orally 3 times a day (04 Oct 2022 11:00)  Multiple Vitamins oral tablet: 1 tab(s) orally once a day via PEG (04 Oct 2022 11:00)  SSD 1% topical cream: Apply topically to affected area every 12 hours (31 May 2023 01:17)  tamsulosin 0.4 mg oral capsule: 1 cap(s) orally once a day (at bedtime) (13 Oct 2022 10:24)  Tylenol 325 mg oral tablet: 2 tab(s) orally once a day (04 Oct 2022 11:00)  zinc sulfate 220 mg oral tablet: 1 tab(s) orally once a day (31 May 2023 01:16)    No permtinent family history of PVD    REVIEW OF SYSTEMS:  GENERAL:                                         negative  SKIN:                                                 negative  OPTHALMOLOGIC:                          negative  ENMT:                                               negative  RESPIRATORY AND THORAX:       see HPI  CARDIOVASCULAR:                        see HPI  GASTROINTESTINAL:                       negative  NEPHROLOGY:                                see HPI  MUSCULOSKELETAL:                       negative  NEUROLOGIC:                                   negative  PSYCHIATRIC:                                    negative  HEMATOLOGY/LYMPHATICS:         negative  ENDOCRINE:                                  see HPI  ALLERGIC/IMMUNOLOGIC:            negative    12 point ROS otherwise normal except as stated in HPI  FHx: none  SHX:  [ ]  smoking     [ ] alcohol use    PHYSICAL EXAM  Vital Signs Last 24 Hrs  T(C): 35.1 (2023 09:07), Max: 36.1 (2023 04:00)  T(F): 95.2 (2023 09:07), Max: 97 (2023 04:00)  HR: 74 (2023 10:02) (74 - 91)  BP: 108/57 (2023 09:07) (87/51 - 116/80)  BP(mean): 76 (2023 09:07) (64 - 76)  RR: 20 (2023 09:07) (16 - 24)  SpO2: 100% (2023 10:02) (99% - 100%)    Parameters below as of 2023 09:07  Patient On (Oxygen Delivery Method): ventilator        Appearance: Normal	  HEENT:   Normal oral mucosa, PERRL, EOMI	  Neck: Supple, - JVD;   Cardiovascular: Normal S1 S2, No JVD, No murmurs,   Respiratory: Lungs clear to auscultation, No Rales, Rhonchi, Wheezing	  Gastrointestinal:  Soft, Non-tender, positive BS	  Skin: No rashes, No ecchymoses, No cyanosis  Extremities: Normal range of motion, No clubbing, cyanosis or edema  Neurologic: Non-focal  Psychiatry: A & O x 3, Mood & affect appropriate      MEDICATIONS:   MEDICATIONS  (STANDING):  albuterol/ipratropium for Nebulization 3 milliLiter(s) Nebulizer every 6 hours  aMIOdarone    Tablet 400 milliGRAM(s) Oral every 8 hours  aMIOdarone    Tablet   Oral   ascorbic acid 500 milliGRAM(s) Oral daily  aspirin  chewable 81 milliGRAM(s) Oral daily  atorvastatin 80 milliGRAM(s) Oral at bedtime  buMETAnide Injectable 2 milliGRAM(s) IV Push every 12 hours  cefepime   IVPB 1000 milliGRAM(s) IV Intermittent every 12 hours  chlorhexidine 0.12% Liquid 15 milliLiter(s) Oral Mucosa two times a day  dextrose 5% + sodium chloride 0.9% with potassium chloride 20 mEq/L 1000 milliLiter(s) (150 mL/Hr) IV Continuous <Continuous>  dextrose 5%. 1000 milliLiter(s) (100 mL/Hr) IV Continuous <Continuous>  dextrose 5%. 1000 milliLiter(s) (50 mL/Hr) IV Continuous <Continuous>  dextrose 50% Injectable 25 Gram(s) IV Push once  dextrose 50% Injectable 25 Gram(s) IV Push once  dextrose 50% Injectable 12.5 Gram(s) IV Push once  ferrous    sulfate Liquid 300 milliGRAM(s) Enteral Tube daily  folic acid 1 milliGRAM(s) Oral daily  glucagon  Injectable 1 milliGRAM(s) IntraMuscular once  heparin   Injectable 5000 Unit(s) SubCutaneous every 12 hours  insulin lispro (ADMELOG) corrective regimen sliding scale   SubCutaneous three times a day before meals  insulin regular Infusion 10 Unit(s)/Hr (10 mL/Hr) IV Continuous <Continuous>  levETIRAcetam  Solution 500 milliGRAM(s) Oral two times a day  midodrine. 10 milliGRAM(s) Oral three times a day  multivitamin 1 Tablet(s) Oral daily  pantoprazole  Injectable 40 milliGRAM(s) IV Push every 12 hours  silver sulfADIAZINE 1% Cream 1 Application(s) Topical every 12 hours  sodium bicarbonate 1300 milliGRAM(s) Oral every 8 hours  zinc sulfate 220 milliGRAM(s) Oral daily    MEDICATIONS  (PRN):  acetaminophen     Tablet .. 650 milliGRAM(s) Oral every 6 hours PRN Temp greater or equal to 38C (100.4F), Mild Pain (1 - 3)  dextrose Oral Gel 15 Gram(s) Oral once PRN Blood Glucose LESS THAN 70 milliGRAM(s)/deciliter  sodium chloride 0.9% Bolus. 100 milliLiter(s) IV Bolus every 5 minutes PRN SBP LESS THAN or EQUAL to 80 mmHg      LAB/STUDIES:                        9.9    13.27 )-----------( 403      ( 2023 07:17 )             30.3     06-01    119<LL>  |  81<L>  |  241<HH>  ----------------------------<  123<H>  4.8   |  12<L>  |  4.6<HH>    Ca    7.6<L>      2023 07:17  Phos  3.1     06-  Mg     2.8     06-    TPro  5.4<L>  /  Alb  1.5<L>  /  TBili  0.5  /  DBili  x   /  AST  24  /  ALT  <5  /  AlkPhos  157<H>  06-    PT/INR - ( 30 May 2023 20:08 )   PT: 14.10 sec;   INR: 1.23 ratio         PTT - ( 30 May 2023 20:08 )  PTT:32.3 sec  LIVER FUNCTIONS - ( 2023 07:17 )  Alb: 1.5 g/dL / Pro: 5.4 g/dL / ALK PHOS: 157 U/L / ALT: <5 U/L / AST: 24 U/L / GGT: x             Urinalysis Basic - ( 30 May 2023 21:15 )    Color: Jazmine / Appearance: Turbid / S.023 / pH: x  Gluc: x / Ketone: Trace  / Bili: Negative / Urobili: 6 mg/dL   Blood: x / Protein: 100 mg/dL / Nitrite: Negative   Leuk Esterase: Large / RBC: 355 /HPF /  /HPF   Sq Epi: x / Non Sq Epi: x / Bacteria: Negative      CARDIAC MARKERS ( 2023 07:17 )  x     / 1.55 ng/mL / x     / x     / x      CARDIAC MARKERS ( 31 May 2023 05:50 )  x     / 1.57 ng/mL / x     / x     / x      CARDIAC MARKERS ( 31 May 2023 03:05 )  x     / 1.63 ng/mL / x     / x     / x      CARDIAC MARKERS ( 30 May 2023 20:08 )  x     / 1.80 ng/mL / x     / x     / x              ABG - ( 2023 10:13 )  pH, Arterial: 7.40  pH, Blood: x     /  pCO2: 25    /  pO2: 186   / HCO3: 16    / Base Excess: -8.1  /  SaO2: 100.0               Culture - Blood (collected 30 May 2023 20:09)  Source: .Blood Blood-Peripheral  Preliminary Report (2023 02:01):    No growth to date.    Culture - Blood (collected 30 May 2023 20:09)  Source: .Blood Blood-Peripheral  Preliminary Report (2023 02:01):    No growth to date.        IMAGING:

## 2023-06-01 NOTE — PATIENT PROFILE ADULT - SURGICAL SITE INCISION
----- Message from Jason Damon sent at 9/1/2021  7:25 AM CDT -----  Subject: Refill Request    QUESTIONS  Name of Medication? ARIPiprazole (ABILIFY) 2 MG tablet  Patient-reported dosage and instructions? 2 MG / 1 PER DAY   How many days do you have left? 0  Preferred Pharmacy? [facility] phone number (if available)? [phone]  ---------------------------------------------------------------------------  --------------  CALL BACK INFO  What is the best way for the office to contact you?  OK to leave message on   voicemail  Preferred Call Back Phone Number? [phone] no

## 2023-06-01 NOTE — PROGRESS NOTE ADULT - ASSESSMENT
MICHELLE on CKD 3a  Hyponatremia / hypokalemia  Acute anemia r/o UGIB  Troponemia  Lactic acidosis  DKA   Afib on amiodarone      - replete K aggressively, will lower on insulin  - sepsis w/u  - s/p 2 units RBCs and high dose diuretic / non oliguric, has good urine output   - GI eval  - dose meds for eGFR < 15   - teixeira, strict i/o   - d/c tamsulosin d/t hypotension  - increase midodrine to 10mg q8h  - start sodium bicarb po 1300mg q8h when able to receive meds by peg  - will need RRT if no improvement in renal function / discussed with patient's wife Patricio and daughter Sanam, agreeable to RRT if needed  - repeat chemistry reviewed, no improvement-- will dialyze once udall catheter is placed.  (HD is not an emergency and will be done in AM depending when udall catheter is placed)  - cardio eval for high troponin prior to HD  - d/w ICU team, pending palliative care discussions with family before deciding on RRT    62 yo M with  PMH of ICH (2021) s/p trach and PEG, HTN, HLD, T2DM, CAD s/p stents, Recurrent C diff, BIBEMS from [facility] for abnormal labs. Patient non-verbal at baseline - limited history. Per NH chart  have a BUN greater than 200 and creatinine of 4.3 on outpatient labs. Outpatient CXR also w/ new L sided pleural effusion. With EMS patient was also found to be in irregular rhythm, no known history of A-fib or a flutter.  In ED patient had borderline BP, and was in Atrial fibrillation. He was started on amiodarone gtt. Labs showed , Cr. 4.1, Hb 6.6. Trops 1.8. He was given 1U PRBC, gastric lavage revealed coffee ground emesis w/no active bleeding.  Patient being admitted to ICU for management of Sepsis likely from UTI, MICHELLE likely prerenal and NSTEMI.     MICHELLE on CKD 3a - severe azothemia  likely due to GIB abd low BP, worsening  Hyponatremia / hypokalemia  Acute anemia - UGIB  Troponemia  Lactic acidosis  DKA   Afib on amiodarone      1st HD today 2 hrs  3K bath concurrent BF (low Na)   - teixeira, strict i/o   - increase midodrine to 10mg q8h  - discussed with patient's wife Patricio who agreed to HD  - cardio eval for high troponin prior to HD  -

## 2023-06-01 NOTE — CHART NOTE - NSCHARTNOTEFT_GEN_A_CORE
"   Orthopaedic PA Progress Note    Interval changes:Doing well, feels slightly stronger today. Wife at bedside, thinks SNF arranged for tomrrow    ROS - Patient denies any new issues. No chest pain, dyspnea, or fever.  Pain well controlled.    Blood pressure 147/74, pulse 62, temperature 36.9 °C (98.5 °F), resp. rate 16, height 1.753 m (5' 9\"), weight 87.1 kg (192 lb), SpO2 93 %.    Patient seen and examined  No acute distress  Breathing non labored  RRR  Surgical splint dressing is clean, dry, and intact. Toes DF/PF/SILT with capillary refill less than 2 seconds. No lower leg tenderness or discomfort. Soft proximal compartments    Recent Labs      07/06/18   1113  07/07/18   0811  07/08/18   0903   WBC  17.3*  11.2*  8.5   RBC  4.31*  4.27*  4.32*   HEMOGLOBIN  14.1  13.5*  13.9*   HEMATOCRIT  41.3*  40.4*  40.4*   MCV  95.8  94.6  93.5   MCH  32.7  31.6  32.2   MCHC  34.1  33.4*  34.4   RDW  44.5  44.0  43.9   PLATELETCT  185  185  199   MPV  9.9  10.3  10.1       Active Hospital Problems    Diagnosis   • Closed right ankle fracture [S82.891A]     Priority: High   • Diabetes (HCC) [E11.9]   • Constipation [K59.00]   • CAD (coronary artery disease) [I25.10]     Assessment/Plan:  POD#3 S/P ORIF R ankle trimalleolar fx  Wt bearing status - NWB RLE  PT/OT-initiated  Wound care:dresing to be left in place  Drains - no  Gonsales-no  Sutures/Staples out- 10-14 days post operatively  Antibiotics: complete  DVT Prophylaxis- TEDS/SCDs/Foot pumps.   Lovenox: not indicated for Ortho reasons, decision for chem VTE by Med  Future Procedures - not anticipated  Case Coordination for Discharge Planning - Disposition HH vs SNF, SNF orders initiated, spouse very concerned re Fall risks         " Transfer Note    Transfer from: MICU    Transfer to: CEU    HPI: 64 yo M with  PMH of ICH (2021) s/p trach and PEG, HTN, HLD, T2DM, CAD s/p stents, Recurrent C diff, BIBEMS from [facility] for abnormal labs. Patient non-verbal at baseline - limited history. Per NH chart  have a BUN greater than 200 and creatinine of 4.3 on outpatient labs. Outpatient CXR also w/ new L sided pleural effusion. With EMS patient was also found to be in irregular rhythm, no known history of A-fib or a flutter.    In ED patient had borderline BP, and was in Atrial fibrillation. He was started on amiodarone gtt. Labs showed , Cr. 4.1, Hb 6.6. Trops 1.8. He was given 1U PRBC, gastric lavage revealed coffee ground emesis w/no active bleeding. He was started on PPI drip and GI consulted in ED. He was given IV aztreonam and vancomycin.   Patient being admitted to ICU for management of MICHELLE and NSTEMI.     MICU COURSE: Pt was noted to have multiple issues on admission:   MICHELLE (with unclear cause) on CKD and HAGMA 2/2 renal dysfunction, lactic acidosis and DKA: seen by nephro, planned for HD today noon ([place] being placed by vascular now and scheduled for   Acute anemia HgB  Hyponatremia (likely hypervolemic from acute kidney dysfunction; s/p lasix 80mg iv and bumex 4mg iv and currently on bumex 2mg iv q12hr)          ASSESSMENT & PLAN:         For Follow-Up:          Vital Signs Last 24 Hrs  T(C): 35.6 (01 Jun 2023 11:45), Max: 36.1 (01 Jun 2023 04:00)  T(F): 96.1 (01 Jun 2023 11:45), Max: 97 (01 Jun 2023 04:00)  HR: 84 (01 Jun 2023 11:45) (74 - 91)  BP: 94/51 (01 Jun 2023 11:45) (87/51 - 116/80)  BP(mean): 66 (01 Jun 2023 11:45) (64 - 76)  RR: 20 (01 Jun 2023 11:45) (16 - 24)  SpO2: 100% (01 Jun 2023 11:45) (99% - 100%)    Parameters below as of 01 Jun 2023 11:45  Patient On (Oxygen Delivery Method): ventilator      I&O's Summary    31 May 2023 07:01  -  01 Jun 2023 07:00  --------------------------------------------------------  IN: 30 mL / OUT: 900 mL / NET: -870 mL    01 Jun 2023 07:01  -  01 Jun 2023 11:56  --------------------------------------------------------  IN: 600 mL / OUT: 0 mL / NET: 600 mL          MEDICATIONS  (STANDING):  albuterol/ipratropium for Nebulization 3 milliLiter(s) Nebulizer every 6 hours  aMIOdarone    Tablet   Oral   aMIOdarone    Tablet 400 milliGRAM(s) Oral every 8 hours  ascorbic acid 500 milliGRAM(s) Oral daily  aspirin  chewable 81 milliGRAM(s) Oral daily  atorvastatin 80 milliGRAM(s) Oral at bedtime  buMETAnide Injectable 2 milliGRAM(s) IV Push every 12 hours  cefepime   IVPB 1000 milliGRAM(s) IV Intermittent every 12 hours  chlorhexidine 0.12% Liquid 15 milliLiter(s) Oral Mucosa two times a day  chlorhexidine 2% Cloths 1 Application(s) Topical <User Schedule>  dextrose 5% + sodium chloride 0.9% with potassium chloride 20 mEq/L 1000 milliLiter(s) (150 mL/Hr) IV Continuous <Continuous>  dextrose 5%. 1000 milliLiter(s) (50 mL/Hr) IV Continuous <Continuous>  dextrose 5%. 1000 milliLiter(s) (100 mL/Hr) IV Continuous <Continuous>  dextrose 50% Injectable 12.5 Gram(s) IV Push once  dextrose 50% Injectable 25 Gram(s) IV Push once  dextrose 50% Injectable 25 Gram(s) IV Push once  ferrous    sulfate Liquid 300 milliGRAM(s) Enteral Tube daily  folic acid 1 milliGRAM(s) Oral daily  glucagon  Injectable 1 milliGRAM(s) IntraMuscular once  heparin   Injectable 5000 Unit(s) SubCutaneous every 12 hours  insulin lispro (ADMELOG) corrective regimen sliding scale   SubCutaneous three times a day before meals  insulin regular Infusion 10 Unit(s)/Hr (10 mL/Hr) IV Continuous <Continuous>  levETIRAcetam  Solution 500 milliGRAM(s) Oral two times a day  lidocaine 2% Injectable 20 milliLiter(s) Local Injection once  midodrine. 10 milliGRAM(s) Oral three times a day  multivitamin 1 Tablet(s) Oral daily  pantoprazole  Injectable 40 milliGRAM(s) IV Push every 12 hours  silver sulfADIAZINE 1% Cream 1 Application(s) Topical every 12 hours  sodium bicarbonate 1300 milliGRAM(s) Oral every 8 hours  zinc sulfate 220 milliGRAM(s) Oral daily    MEDICATIONS  (PRN):  acetaminophen     Tablet .. 650 milliGRAM(s) Oral every 6 hours PRN Temp greater or equal to 38C (100.4F), Mild Pain (1 - 3)  dextrose Oral Gel 15 Gram(s) Oral once PRN Blood Glucose LESS THAN 70 milliGRAM(s)/deciliter  sodium chloride 0.9% Bolus. 100 milliLiter(s) IV Bolus every 5 minutes PRN SBP LESS THAN or EQUAL to 80 mmHg        LABS                                            9.9                   Neurophils% (auto):   83.7   (06-01 @ 07:17):    13.27)-----------(403          Lymphocytes% (auto):  8.9                                           30.3                   Eosinphils% (auto):   0.1      Manual%: Neutrophils x    ; Lymphocytes x    ; Eosinophils x    ; Bands%: x    ; Blasts x                                    119    |  81     |  241                 Calcium: 7.6   / iCa: x      (06-01 @ 07:17)    ----------------------------<  123       Magnesium: 2.8                              4.8     |  12     |  4.6              Phosphorous: 3.1      TPro  5.4    /  Alb  1.5    /  TBili  0.5    /  DBili  x      /  AST  24     /  ALT  <5     /  AlkPhos  157    01 Jun 2023 07:17 Transfer Note    Transfer from: MICU    Transfer to: CEU    For Follow-Up:  - F/u repeat CMP 20:00 after HD    HPI: 62 yo M with  PMH of ICH (2021) s/p trach and PEG, HTN, HLD, T2DM, CAD s/p stents, Recurrent C diff, BIBEMS from [facility] for abnormal labs. Patient non-verbal at baseline - limited history. Per NH chart  have a BUN greater than 200 and creatinine of 4.3 on outpatient labs. Outpatient CXR also w/ new L sided pleural effusion. With EMS patient was also found to be in irregular rhythm, no known history of A-fib or a flutter.    In ED patient had borderline BP, and was in Atrial fibrillation. He was started on amiodarone gtt. Labs showed , Cr. 4.1, Hb 6.6. Trops 1.8. He was given 1U PRBC, gastric lavage revealed coffee ground emesis w/no active bleeding. He was started on PPI drip and GI consulted in ED. He was given IV aztreonam and vancomycin.   Patient being admitted to ICU for management of MICHELLE and NSTEMI.     MICU COURSE: Pt was noted to have multiple issues:   MICHELLE (with unclear cause) on CKD and HAGMA 2/2 renal dysfunction, lactic acidosis and DKA: seen by nephro, planned for HD today noon ([place] being placed by vascular now)  Acute anemia: HgB 6.7 s/p 2U pRBC - improved to 9.9; evaluated by GI - no evidence of GI bleed (PEG tube flushed with returin of bile and no blood)  Hyponatremia (likely hypervolemic from acute kidney dysfunction Vs ?new CHF; s/p lasix 80mg iv and bumex 4mg iv and currently on bumex 2mg iv q12hr)  NSTEMI: downtrending trops  Afib; switched from amio gtt to PO amiodarone  DKA: started on insulin gtt as BHB was 2.2 and FS in 300s with multifactorial elevated anion gap as described above; but patient's glucose started to drop to <100; Insulin gtt was stopped and started tube feeds; At this time elevated anion gap is more from underlying acute kidney dysfunction as lactate downtrended as well      Vital Signs Last 24 Hrs  T(C): 35.6 (01 Jun 2023 11:45), Max: 36.1 (01 Jun 2023 04:00)  T(F): 96.1 (01 Jun 2023 11:45), Max: 97 (01 Jun 2023 04:00)  HR: 84 (01 Jun 2023 11:45) (74 - 91)  BP: 94/51 (01 Jun 2023 11:45) (87/51 - 116/80)  BP(mean): 66 (01 Jun 2023 11:45) (64 - 76)  RR: 20 (01 Jun 2023 11:45) (16 - 24)  SpO2: 100% (01 Jun 2023 11:45) (99% - 100%)    Parameters below as of 01 Jun 2023 11:45  Patient On (Oxygen Delivery Method): ventilator      I&O's Summary    31 May 2023 07:01  -  01 Jun 2023 07:00  --------------------------------------------------------  IN: 30 mL / OUT: 900 mL / NET: -870 mL    01 Jun 2023 07:01  -  01 Jun 2023 11:56  --------------------------------------------------------  IN: 600 mL / OUT: 0 mL / NET: 600 mL          MEDICATIONS  (STANDING):  albuterol/ipratropium for Nebulization 3 milliLiter(s) Nebulizer every 6 hours  aMIOdarone    Tablet   Oral   aMIOdarone    Tablet 400 milliGRAM(s) Oral every 8 hours  ascorbic acid 500 milliGRAM(s) Oral daily  aspirin  chewable 81 milliGRAM(s) Oral daily  atorvastatin 80 milliGRAM(s) Oral at bedtime  buMETAnide Injectable 2 milliGRAM(s) IV Push every 12 hours  cefepime   IVPB 1000 milliGRAM(s) IV Intermittent every 12 hours  chlorhexidine 0.12% Liquid 15 milliLiter(s) Oral Mucosa two times a day  chlorhexidine 2% Cloths 1 Application(s) Topical <User Schedule>  dextrose 5% + sodium chloride 0.9% with potassium chloride 20 mEq/L 1000 milliLiter(s) (150 mL/Hr) IV Continuous <Continuous>  dextrose 5%. 1000 milliLiter(s) (50 mL/Hr) IV Continuous <Continuous>  dextrose 5%. 1000 milliLiter(s) (100 mL/Hr) IV Continuous <Continuous>  dextrose 50% Injectable 12.5 Gram(s) IV Push once  dextrose 50% Injectable 25 Gram(s) IV Push once  dextrose 50% Injectable 25 Gram(s) IV Push once  ferrous    sulfate Liquid 300 milliGRAM(s) Enteral Tube daily  folic acid 1 milliGRAM(s) Oral daily  glucagon  Injectable 1 milliGRAM(s) IntraMuscular once  heparin   Injectable 5000 Unit(s) SubCutaneous every 12 hours  insulin lispro (ADMELOG) corrective regimen sliding scale   SubCutaneous three times a day before meals  insulin regular Infusion 10 Unit(s)/Hr (10 mL/Hr) IV Continuous <Continuous>  levETIRAcetam  Solution 500 milliGRAM(s) Oral two times a day  lidocaine 2% Injectable 20 milliLiter(s) Local Injection once  midodrine. 10 milliGRAM(s) Oral three times a day  multivitamin 1 Tablet(s) Oral daily  pantoprazole  Injectable 40 milliGRAM(s) IV Push every 12 hours  silver sulfADIAZINE 1% Cream 1 Application(s) Topical every 12 hours  sodium bicarbonate 1300 milliGRAM(s) Oral every 8 hours  zinc sulfate 220 milliGRAM(s) Oral daily    MEDICATIONS  (PRN):  acetaminophen     Tablet .. 650 milliGRAM(s) Oral every 6 hours PRN Temp greater or equal to 38C (100.4F), Mild Pain (1 - 3)  dextrose Oral Gel 15 Gram(s) Oral once PRN Blood Glucose LESS THAN 70 milliGRAM(s)/deciliter  sodium chloride 0.9% Bolus. 100 milliLiter(s) IV Bolus every 5 minutes PRN SBP LESS THAN or EQUAL to 80 mmHg        LABS                                            9.9                   Neurophils% (auto):   83.7   (06-01 @ 07:17):    13.27)-----------(403          Lymphocytes% (auto):  8.9                                           30.3                   Eosinphils% (auto):   0.1      Manual%: Neutrophils x    ; Lymphocytes x    ; Eosinophils x    ; Bands%: x    ; Blasts x                                    119    |  81     |  241                 Calcium: 7.6   / iCa: x      (06-01 @ 07:17)    ----------------------------<  123       Magnesium: 2.8                              4.8     |  12     |  4.6              Phosphorous: 3.1      TPro  5.4    /  Alb  1.5    /  TBili  0.5    /  DBili  x      /  AST  24     /  ALT  <5     /  AlkPhos  157    01 Jun 2023 07:17      ASSESSMENT AND PLAN:     #MICHELLE (pre-renal Vs ATN) on CKD   #Fluid overload  #HAGMA 2/2 renal failure  #?Sepsis POA 2/2 UTI  - Cr 4.6 (baseline 1.1 in 10/22)  - non oliguric (has good urine output)   - RBUS: unremarkable  - c/w bumex 2mg iv q12hr  - c/w midodrine 10mg q8hr per nephro as BP is soft   - F/u TTE  - AG 26; bicarb 12; lactate trended down to normal  - c/w sodium bicarb 1300mg PO TID  - hemodialysis planned for today  - Follow up with nephro   - On abx: cefepime and vanc (renally dosed) for empiric coverage: dc when no longed needed  - UA equivocal for UTI (high WBC and no bacteria)  - BCx negative  - F/u UCx    #Acute anemia  - HgB 6.7 (baseline 8) s/p 2U pRBC - improved to 9.9  - evaluated by GI - no evidence of GI bleed (PEG tube flushed with returin of bile and no blood)  - Monitor H/H closely  - Active T+S  - Goal HgB > 7    #Hyponatremia   - likely hypervolemic from acute kidney dysfunction Vs ?new CHF  - c/w bumex 2mg iv q12hr  - Trend CMP and follow up with nephro    #Afib w/ RVR  - currently rate controlled s/p amio gtt  - c/w PO amio 400mg q8hr    #CAD s/p stents  #NSTEMI  - Trops 1.80-->1.63-->1.57-->1.55  - seen by cardio  - c/w asa and statin  - F/u TTE  - elevated trops likely from demand ischemia    #DKA  - BHB 2.2; AG 26   - started on insulin gtt when FS > 300 but patient's glucose started to drop to <100 so Insulin gtt was stopped and started tube feeds  - At this time elevated anion gap is more from underlying acute kidney dysfunction   - c/w insulin sliding scale  - Closely monitor finger sticks    #Hx ICH s/p trach and PEG  - c/w keppra 500mg BID for seizure prophylaxis    Diet: NPO with PEG feeds  DVT px - heparin  GI px - protonix

## 2023-06-01 NOTE — PROGRESS NOTE ADULT - ASSESSMENT
63 year old man with history of ICH s/p trach and PEG in 2021, CAD, recurrent CDIff brought in for elevated BUN/creatinine. Palliative care consulted for GOC.     Patient seen at bedside.  Plan for HD later today. Will follow    MEDD (morphine equivalent daily dose):    Education about palliative care provided to patient/family.  See Recs below.    Please call [phone] with questions or concerns 24/7.   We will continue to follow.

## 2023-06-02 NOTE — CONSULT NOTE ADULT - ASSESSMENT
- MICHELLE on CKD3 now on HD  - fluid overload and anasarca  - multiple areas of clean but deep decubiti  - DM  - hyponatremia  - ? DKA on admission  - A fib  - h/o recurrent C diff    SUGGEST:  - measure height or "wingspan" - need accurate height  - weigh pt daily in am - need accurate weight as well, with any losses r/t HD if able to remove any fluid  - check zinc level, 25oh vit D, folic acid, and Fe studies and then re-evaluate NH doses  - ok to feed Peptamen AF for now     375 ml over 45 min x 4 feeds/d --> 1800 kcal (low % carb) 114 gm protein (whey source), 1200 mg phos, 62 mEq K/d  - check poc glucose prior to each feeding  - f/u phos with pre-HD labs  - limit pre and post feed flushes to 30 ml   - MICHELLE on CKD3 now on HD  - fluid overload and anasarca  - multiple areas of clean but deep decubiti  - DM  - hyponatremia  - ? DKA on admission  - A fib  - h/o recurrent C diff    SUGGEST:  - measure height or "wingspan" - need accurate height  - weigh pt daily in am - need accurate weight as well, with any losses r/t HD if able to remove any fluid  - check zinc level, 25oh vit D, folic acid, and Fe studies and then re-evaluate NH doses  - ok to feed Peptamen AF for now     375 ml over 45 min x 4 feeds/d --> 1800 kcal (low % carb) 114 gm protein (whey source), 1200 mg phos, 62 mEq K/d  - check poc glucose prior to each feeding  - f/u phos with pre-HD labs  - limit pre and post feed flushes to 30 ml  - document BMs  - will later transition to Glucerna, and at NH likely rec the 1.5

## 2023-06-02 NOTE — CONSULT NOTE ADULT - SUBJECTIVE AND OBJECTIVE BOX
NUTRITION SUPPORT TEAM  -  CONSULT NOTE     62 yo M with  PMH of ICH (2021) s/p trach and PEG, HTN, HLD, T2DM, CAD s/p stents, Recurrent C diff, BIBEMS from [facility] for abnormal labs. Patient non-verbal at baseline - limited history. Per NH chart  have a BUN greater than 200 and creatinine of 4.3 on outpatient labs. Outpatient CXR also w/ new L sided pleural effusion. With EMS patient was also found to be in irregular rhythm, no known history of A-fib or a flutter.  In ED patient had borderline BP, and was in Atrial fibrillation. He was started on amiodarone gtt. Labs showed , Cr. 4.1, Hb 6.6. Trops 1.8. He was given 1U PRBC, gastric lavage revealed coffee ground emesis w/no active bleeding. He was started on PPI drip and GI consulted in ED. He was given IV aztreonam and vancomycin.   Patient being admitted to ICU for management of Sepsis likely from UTI, MICHELLE likely prerenal and NSTEMI.        (30 May 2023 23:39)      NUTRITION SUPPORT NOTE:      REVIEW OF SYSTEMS:  Negative except as noted above.     PAST MEDICAL/SURGICAL HISTORY:   HTN (hypertension)    Diabetes mellitus    H/O intracranial hemorrhage    H/O tracheostomy    PEG (percutaneous endoscopic gastrostomy) status    Hyperlipidemia        ALLERGIES:  penicillin (Other)      VITALS:  T(F): 97.6 (06-02 @ 11:20), Max: 98.1 (06-02 @ 07:35)  HR: 75 (06-02 @ 11:20) (75 - 79)  BP: 95/53 (06-02 @ 11:20) (93/50 - 98/55)  RR: 18 (06-02 @ 11:20) (18 - 20)  SpO2: 99% (06-02 @ 11:20) (99% - 100%)    HEIGHT/WEIGHT/BMI:   Height (cm): 170.2 (06-01), 180.3 (10-04)  Weight (kg): 102 (05-31), 66.4 (10-04)  BMI (kg/m2): 35.2 (06-01), 31.4 (05-31), 20.4 (10-04)    I/Os:     06-01-23 @ 07:01  -  06-02-23 @ 07:00  --------------------------------------------------------  IN:    dextrose 5% + sodium chloride 0.9% w/ Additives: 600 mL    IV PiggyBack: 50 mL    Jevity 1.2: 320 mL  Total IN: 970 mL    OUT:    Indwelling Catheter - Urethral (mL): 175 mL    Other (mL): 0 mL    Voided (mL): 250 mL  Total OUT: 425 mL    Total NET: 545 mL          PHYSICAL EXAM:   GENERAL: NAD, well-groomed, well-developed  HEENT: Moist mucous membranes, Good dentition, No lesions  ABDOMEN: Soft, Nontender, Nondistended  EXTREMITIES:  No clubbing, cyanosis, or edema  SKIN: warm and well perfused; No obvious rashes or lesions  IV ACCESS:   ENTERAL ACCESS:     STANDING MEDICATIONS:   albuterol/ipratropium for Nebulization 3 milliLiter(s) Nebulizer every 6 hours  aMIOdarone    Tablet 400 milliGRAM(s) Oral every 8 hours  aMIOdarone    Tablet   Oral   ascorbic acid 500 milliGRAM(s) Oral daily  aspirin  chewable 81 milliGRAM(s) Oral daily  atorvastatin 80 milliGRAM(s) Oral at bedtime  buMETAnide Injectable 2 milliGRAM(s) IV Push every 12 hours  cefepime   IVPB 1000 milliGRAM(s) IV Intermittent every 12 hours  chlorhexidine 0.12% Liquid 15 milliLiter(s) Oral Mucosa two times a day  chlorhexidine 2% Cloths 1 Application(s) Topical <User Schedule>  dextrose 5%. 1000 milliLiter(s) IV Continuous <Continuous>  dextrose 5%. 1000 milliLiter(s) IV Continuous <Continuous>  dextrose 50% Injectable 25 Gram(s) IV Push once  dextrose 50% Injectable 25 Gram(s) IV Push once  dextrose 50% Injectable 12.5 Gram(s) IV Push once  ferrous    sulfate Liquid 300 milliGRAM(s) Enteral Tube daily  folic acid 1 milliGRAM(s) Oral daily  glucagon  Injectable 1 milliGRAM(s) IntraMuscular once  heparin   Injectable 5000 Unit(s) SubCutaneous every 12 hours  insulin lispro (ADMELOG) corrective regimen sliding scale   SubCutaneous three times a day before meals  levETIRAcetam  Solution 500 milliGRAM(s) Oral two times a day  lidocaine 2% Injectable 20 milliLiter(s) Local Injection once  midodrine. 10 milliGRAM(s) Oral three times a day  multivitamin 1 Tablet(s) Oral daily  pantoprazole    Tablet 40 milliGRAM(s) Oral before breakfast  silver sulfADIAZINE 1% Cream 1 Application(s) Topical every 12 hours  sodium bicarbonate 1300 milliGRAM(s) Oral every 8 hours  vancomycin  IVPB 1000 milliGRAM(s) IV Intermittent every 48 hours  zinc sulfate 220 milliGRAM(s) Oral daily      LABS:                         9.9    13.27 )-----------( 403      ( 01 Jun 2023 07:17 )             30.3     120<L>  |  80<L>  |  240<HH>  ----------------------------<  134<H>          (06-01-23 @ 22:50)  4.0   |  16<L>  |  4.5<HH>    Ca    7.5<L>          (06-01-23 @ 22:50)  Phos  3.1         (06-01-23 @ 07:17)  Mg     2.8         (06-01-23 @ 07:17)    TPro  5.2<L>  /  Alb  1.8<L>  /  TBili  0.5  /  DBili  x   /  AST  15  /  ALT  <5  /  AlkPhos  151<H>       06-01-23 @ 22:50      Triglycerides, Serum:   Prealbumin, Serum:   Vitamin D, 25-Hydroxy:   Folate:   Vitamin B12, Serum:   Zinc Level, Plasma:   CRP:   A1c: 6.2 % (05-31-23 @ 05:50)      Blood Glucose (Past 24 hours):  213 mg/dL (06-02 @ 11:14)  232 mg/dL (06-02 @ 06:27)  185 mg/dL (06-01 @ 21:20)  152 mg/dL (06-01 @ 18:18)      DIET:   Diet, NPO with Tube Feed:   Tube Feeding Modality: Gastrostomy  Peptamen A.F. Formula  Total Volume for 24 Hours (mL): 1200  Bolus  Total Volume of Bolus (mL):  300  Total # of Feeds: 4  Tube Feed Frequency: Every 6 hours   Tube Feed Start Time: 00:00  Bolus Feed Rate (mL per Hour): 300   Bolus Feed Duration (in Hours): 6  Bolus Feed Instructions:  Additional water flushes: 100 mL pre + post feeds. Team to adjust water flushes prn. (06-02-23 @ 10:12) [Active]          RADIOLOGY:    NUTRITION SUPPORT TEAM  -  CONSULT NOTE     62 yo M with  PMH of ICH (2021) s/p trach and PEG, HTN, HLD, T2DM, CAD s/p stents, Recurrent C diff, BIBEMS from [facility] for abnormal labs. Patient non-verbal at baseline - limited history. Per NH chart  have a BUN greater than 200 and creatinine of 4.3 on outpatient labs. Outpatient CXR also w/ new L sided pleural effusion. With EMS patient was also found to be in irregular rhythm, no known history of A-fib or a flutter.  In ED patient had borderline BP, and was in Atrial fibrillation. He was started on amiodarone gtt. Labs showed , Cr. 4.1, Hb 6.6. Trops 1.8. He was given 1U PRBC, gastric lavage revealed coffee ground emesis w/no active bleeding. He was started on PPI drip and GI consulted in ED. He was given IV aztreonam and vancomycin.   Patient being admitted to ICU for management of Sepsis likely from UTI, MICHELLE likely prerenal and NSTEMI.   (30 May 2023 23:39)  pt started HD on 6/1 with only 0.5 liter volume removed, limited by hypotension.  pt seen by GI for low Hb and reported dark output from GT but they found bilious GT irrigation and brown stool.  pt seen at HD today and d/w RNs. Adm wt reported 102 kg, pre-HD today 92 kg. Adm wt at NH in Oct not reported     also note 10 cm discrepancy in height from the 2 admissions  Also d/w resident in CEU.    meds and feeds at NH reviewed - pt has been fed 1600 ml/d of Diabetisource AC     He has also been given MV, folate, 8000 IU ergocalciferol, 220 mg liquid FeSO4, vit C, and Zn daily.    REVIEW OF SYSTEMS:  Negative except as noted above.     PAST MEDICAL/SURGICAL HISTORY:   HTN (hypertension)  Diabetes mellitus  H/O intracranial hemorrhage  H/O tracheostomy  PEG (percutaneous endoscopic gastrostomy) status  Hyperlipidemia    ALLERGIES:  penicillin (Other)    VITALS:  T(F): 97.6 (06-02 @ 11:20), Max: 98.1 (06-02 @ 07:35)  HR: 75 (06-02 @ 11:20) (75 - 79)  BP: 95/53 (06-02 @ 11:20) (93/50 - 98/55)  RR: 18 (06-02 @ 11:20) (18 - 20)  SpO2: 99% (06-02 @ 11:20) (99% - 100%)    HEIGHT/WEIGHT/BMI:   Height (cm): 170.2 (06-01), 180.3 (10-04)  Weight (kg): 102 (05-31), 66.4 (10-04)  BMI (kg/m2): 35.2 (06-01), 31.4 (05-31), 20.4 (10-04)    I/Os:   06-01-23 @ 07:01  -  06-02-23 @ 07:00  --------------------------------------------------------  IN:    dextrose 5% + sodium chloride 0.9% w/ Additives: 600 mL    IV PiggyBack: 50 mL    Jevity 1.2: 320 mL  Total IN: 970 mL  OUT:    Indwelling Catheter - Urethral (mL): 175 mL    Other (mL): 0 mL    Voided (mL): 250 mL  Total OUT: 425 mL  Total NET: 545 mL    PHYSICAL EXAM:   GENERAL: intubated via trach, no sedation, anicteric, conj not pale  pt does waken when examined, + cough, does not follow commands  HEENT: Moist mucous membranes  ABDOMEN: Soft, Nontender, Nondistended, GT site without surrounding erythema or leak but + dark crusting at site  EXTREMITIES:  No clubbing, cyanosis, + contracted  ++ pitting edema of thighs and sacrum, ++ scrotal edema  SKIN: + skin breakdown on heels bilat, large open area sacrum smaller areas on ischial areas - clean, no purulence noted  + teixeira with small volume very dark urine    STANDING MEDICATIONS:   albuterol/ipratropium for Nebulization 3 milliLiter(s) Nebulizer every 6 hours  aMIOdarone    Tablet 400 milliGRAM(s) Oral every 8 hours  aMIOdarone    Tablet   Oral   ascorbic acid 500 milliGRAM(s) Oral daily  aspirin  chewable 81 milliGRAM(s) Oral daily  atorvastatin 80 milliGRAM(s) Oral at bedtime  buMETAnide Injectable 2 milliGRAM(s) IV Push every 12 hours  cefepime   IVPB 1000 milliGRAM(s) IV Intermittent every 12 hours  chlorhexidine 0.12% Liquid 15 milliLiter(s) Oral Mucosa two times a day  chlorhexidine 2% Cloths 1 Application(s) Topical <User Schedule>  ferrous    sulfate Liquid 300 milliGRAM(s) Enteral Tube daily  folic acid 1 milliGRAM(s) Oral daily  heparin   Injectable 5000 Unit(s) SubCutaneous every 12 hours  insulin lispro (ADMELOG) corrective regimen sliding scale   SubCutaneous three times a day before meals  levETIRAcetam  Solution 500 milliGRAM(s) Oral two times a day  lidocaine 2% Injectable 20 milliLiter(s) Local Injection once  midodrine. 10 milliGRAM(s) Oral three times a day  multivitamin 1 Tablet(s) Oral daily  pantoprazole    Tablet 40 milliGRAM(s) Oral before breakfast  silver sulfADIAZINE 1% Cream 1 Application(s) Topical every 12 hours  sodium bicarbonate 1300 milliGRAM(s) Oral every 8 hours  vancomycin  IVPB 1000 milliGRAM(s) IV Intermittent every 48 hours  zinc sulfate 220 milliGRAM(s) Oral daily    LABS:                         9.9    13.27 )-----------( 403      ( 01 Jun 2023 07:17 )             30.3     120<L>  |  80<L>  |  240<HH>  ----------------------------<  134<H>          (06-01-23 @ 22:50)  4.0   |  16<L>  |  4.5<HH>    Ca    7.5<L>          (06-01-23 @ 22:50)  Phos  3.1         (06-01-23 @ 07:17)  Mg     2.8         (06-01-23 @ 07:17)    TPro  5.2<L>  /  Alb  1.8<L>  /  TBili  0.5  /  DBili  x   /  AST  15  /  ALT  <5  /  AlkPhos  151<H>       06-01-23 @ 22:50      Triglycerides, Serum:   Prealbumin, Serum:   Vitamin D, 25-Hydroxy:   Folate:   Vitamin B12, Serum:   Zinc Level, Plasma:   CRP:   A1c: 6.2 % (05-31-23 @ 05:50)      Blood Glucose (Past 24 hours):  213 mg/dL (06-02 @ 11:14)  232 mg/dL (06-02 @ 06:27)  185 mg/dL (06-01 @ 21:20)  152 mg/dL (06-01 @ 18:18)    DIET:   Diet, NPO with Tube Feed:   Tube Feeding Modality: Gastrostomy  Peptamen A.F. Formula  Total Volume for 24 Hours (mL): 1200  Bolus  Total Volume of Bolus (mL):  300  Total # of Feeds: 4  Tube Feed Frequency: Every 6 hours   Tube Feed Start Time: 00:00  Bolus Feed Rate (mL per Hour): 300   Bolus Feed Duration (in Hours): 6  Bolus Feed Instructions:  Additional water flushes: 100 mL pre + post feeds. Team to adjust water flushes prn. (06-02-23 @ 10:12) [Active]

## 2023-06-02 NOTE — DIETITIAN INITIAL EVALUATION ADULT - NAME AND PHONE
Gifty [id number] or TEAMS    Nutrition Interventions: TF regimen, coordination of care; Nutrition Monitoring: Diet order, weights, labs, NFPF, body composition, BM and tolerance to TF regimen

## 2023-06-02 NOTE — PROGRESS NOTE ADULT - SUBJECTIVE AND OBJECTIVE BOX
Nephrology progress note    Patient is seen and examined, events over the last 24 h noted .  Had a short HD yesterday with co-current BDF  BUN/creat/na unchanged  2nd HD today  BP low  Allergies:  penicillin (Other)    Hospital Medications:   MEDICATIONS  (STANDING):  albuterol/ipratropium for Nebulization 3 milliLiter(s) Nebulizer every 6 hours  aMIOdarone    Tablet 400 milliGRAM(s) Oral every 8 hours  aMIOdarone    Tablet   Oral   ascorbic acid 500 milliGRAM(s) Oral daily  aspirin  chewable 81 milliGRAM(s) Oral daily  atorvastatin 80 milliGRAM(s) Oral at bedtime  buMETAnide Injectable 2 milliGRAM(s) IV Push every 12 hours  cefepime   IVPB 1000 milliGRAM(s) IV Intermittent every 12 hours  chlorhexidine 0.12% Liquid 15 milliLiter(s) Oral Mucosa two times a day  chlorhexidine 2% Cloths 1 Application(s) Topical <User Schedule>  dextrose 5%. 1000 milliLiter(s) (100 mL/Hr) IV Continuous <Continuous>  dextrose 5%. 1000 milliLiter(s) (50 mL/Hr) IV Continuous <Continuous>  dextrose 50% Injectable 25 Gram(s) IV Push once  dextrose 50% Injectable 25 Gram(s) IV Push once  dextrose 50% Injectable 12.5 Gram(s) IV Push once  ferrous    sulfate Liquid 300 milliGRAM(s) Enteral Tube daily  folic acid 1 milliGRAM(s) Oral daily  glucagon  Injectable 1 milliGRAM(s) IntraMuscular once  heparin   Injectable 5000 Unit(s) SubCutaneous every 12 hours  insulin lispro (ADMELOG) corrective regimen sliding scale   SubCutaneous three times a day before meals  levETIRAcetam  Solution 500 milliGRAM(s) Oral two times a day  lidocaine 2% Injectable 20 milliLiter(s) Local Injection once  midodrine. 10 milliGRAM(s) Oral three times a day  multivitamin 1 Tablet(s) Oral daily  pantoprazole    Tablet 40 milliGRAM(s) Oral before breakfast  silver sulfADIAZINE 1% Cream 1 Application(s) Topical every 12 hours  sodium bicarbonate 1300 milliGRAM(s) Oral every 8 hours  vancomycin  IVPB 1000 milliGRAM(s) IV Intermittent every 48 hours  zinc sulfate 220 milliGRAM(s) Oral daily        VITALS:  T(F): 98.1 (23 @ 07:35), Max: 98.1 (23 @ 07:35)  HR: 79 (23 @ 07:35)  BP: 98/55 (23 @ 07:35)  RR: 18 (23 @ 07:35)  SpO2: 100% (23 @ 07:35)  Wt(kg): --     @ 07:01  -   @ 07:00  --------------------------------------------------------  IN: 30 mL / OUT: 900 mL / NET: -870 mL     @ 07:01  -   @ 07:00  --------------------------------------------------------  IN: 970 mL / OUT: 425 mL / NET: 545 mL          PHYSICAL EXAM:  Constitutional: NAD  HEENT: anicteric sclera  Respiratory: CTA  Cardiovascular: S1, S2, RRR  Gastrointestinal: BS+  Extremities: Mild peripheral edema  Neurological: Awake  : Terry teixeira.   Vascular Access: Rt IJ [place]     LABS:      120<L>  |  80<L>  |  240<HH>  ----------------------------<  134<H>  4.0   |  16<L>  |  4.5<HH>    Ca    7.5<L>      2023 22:50  Phos  3.1       Mg     2.8         TPro  5.2<L>  /  Alb  1.8<L>  /  TBili  0.5  /  DBili      /  AST  15  /  ALT  <5  /  AlkPhos  151<H>                            9.9    13.27 )-----------( 403      ( 2023 07:17 )             30.3       Urine Studies:  Urinalysis Basic - ( 30 May 2023 21:15 )    Color: Jazmine / Appearance: Turbid / S.023 / pH:   Gluc:  / Ketone: Trace  / Bili: Negative / Urobili: 6 mg/dL   Blood:  / Protein: 100 mg/dL / Nitrite: Negative   Leuk Esterase: Large / RBC: 355 /HPF /  /HPF   Sq Epi:  / Non Sq Epi:  / Bacteria: Negative      Sodium, Random Urine: 96.0 mmoL/L ( @ 05:50)  Osmolality, Random Urine: 347 mos/kg ( @ 05:50)  Creatinine, Random Urine: 10 mg/dL ( @ 05:50)    RADIOLOGY & ADDITIONAL STUDIES:  < from: Xray Chest 1 View-PORTABLE IMMEDIATE (Xray Chest 1 View-PORTABLE IMMEDIATE .) (23 @ 13:47) >  Impression:  Stable bilateral opacities/left effusion. No pneumothorax    New right-sided [place] catheter, satisfactory position    < end of copied text >

## 2023-06-02 NOTE — PROGRESS NOTE ADULT - ASSESSMENT
64 yo M with  PMH of ICH (2021) s/p trach and PEG, HTN, HLD, T2DM, CAD s/p stents, Recurrent C diff, BIBEMS from [facility] for abnormal labs. Patient non-verbal at baseline - limited history. Per NH chart  have a BUN greater than 200 and creatinine of 4.3 on outpatient labs. Outpatient CXR also w/ new L sided pleural effusion. In ED, patient   In ED patient had borderline BP, and was in Atrial fibrillation. He was started on amiodarone gtt. Labs showed , Cr. 4.1, Hb 6.6. Trops 1.8. He was given 1U PRBC, gastric lavage revealed coffee ground emesis w/no active bleeding. He was started on PPI drip and GI consulted in ED. He was given IV aztreonam and vancomycin.   Patient being admitted to ICU for management of MICHELLE and NSTEMI.     MICU COURSE: Pt was noted to have multiple issues:   MICHELLE (with unclear cause) on CKD and HAGMA 2/2 renal dysfunction, lactic acidosis and DKA: seen by nephro, planned for HD today noon ([place] being placed by vascular now)  Acute anemia: HgB 6.7 s/p 2U pRBC - improved to 9.9; evaluated by GI - no evidence of GI bleed (PEG tube flushed with returin of bile and no blood)  Hyponatremia (likely hypervolemic from acute kidney dysfunction Vs ?new CHF; s/p lasix 80mg iv and bumex 4mg iv and currently on bumex 2mg iv q12hr)  NSTEMI: downtrending trops  Afib; switched from amio gtt to PO amiodarone  DKA: started on insulin gtt as BHB was 2.2 and FS in 300s with multifactorial elevated anion gap as described above; but patient's glucose started to drop to <100; Insulin gtt was stopped and started tube feeds; At this time elevated anion gap is more from underlying acute kidney dysfunction as lactate downtrended as well 64 yo M with  PMH of ICH (2021) s/p trach and PEG, HTN, HLD, T2DM, CAD s/p stents, Recurrent C diff, BIBEMS from [facility] for abnormal labs. Patient non-verbal at baseline - limited history. Per NH chart  have a BUN greater than 200 and creatinine of 4.3 on outpatient labs. Outpatient CXR also w/ new L sided pleural effusion. In ED, patient was hypotensive with Afib with RVR, found to be in acure renal failure, anemic with elevated trops. He was started on Amiodarone GTT, PPI GTT, gastric lavage with coffee ground secretions, started on broad spectrum abx and admitted to MICU  While in MICU, found to be in DKA, s/p insulin drip, MICHELLE started on HD, acute anemia s/p PRBC transfusion, NSTEMI with downtrending trops, now downgraded to SDU    Acute Renal Failure, started on HD 6/1  Fluid Overload  HAGMA  - non oliguric, RBUS without hydro   - started on HD 6/1 via R IJ [place]   - c/w bumex 2mg iv q12hr  - c/w midodrine 10mg q8hr  - c/w sodium bicarb 1300mg PO TID  - F/u TTE  - nephrology following     Candida tropicalis UTI  on cefepime and vanc empirically  Blood clts neg, URine with Candida tropicalis   change abx to fluconazole  ID eval    Acute anemia  - HgB 6.7 (baseline 8) s/p 2U pRBC - improved to 9.9  - evaluated by GI - no evidence of GI bleed (PEG tube flushed with return of bile and no blood)  - Monitor H/H closely  - Active T+S    Hyponatremia, likely hypervolemic  - c/w bumex 2mg iv q12hr and HD per nephro  - daily BMP    Afib w/ RVR - now rate controlled   - currently rate controlled s/p amio gtt  - change amio to 200 q24H     CAD s/p stents  NSTEMI  - Trops 1.80-->1.63-->1.57-->1.55  - seen by cardio,  c/w asa and statin, medical management     DKA, resolved  - s/p insulin drip, now on basal/bolus and tube feeds     Hx ICH s/p trach and PEG  Bedbound from NH   - c/w keppra 500mg BID for seizure prophylaxis    DNR only, overall prognosis is poor, continue monitoring in SDU

## 2023-06-02 NOTE — PROGRESS NOTE ADULT - ASSESSMENT
62 yo M with  PMH of ICH (2021) s/p trach and PEG, HTN, HLD, T2DM, CAD s/p stents, Recurrent C diff, BIBEMS from [facility] for abnormal labs. Patient non-verbal at baseline - limited history. Per NH chart  have a BUN greater than 200 and creatinine of 4.3 on outpatient labs. Outpatient CXR also w/ new L sided pleural effusion. With EMS patient was also found to be in irregular rhythm, no known history of A-fib or a flutter.  In ED patient had borderline BP, and was in Atrial fibrillation. He was started on amiodarone gtt. Labs showed , Cr. 4.1, Hb 6.6. Trops 1.8. He was given 1U PRBC, gastric lavage revealed coffee ground emesis w/no active bleeding.  Patient being admitted to ICU for management of Sepsis likely from UTI, MICHELLE likely prerenal and NSTEMI.     MICHELLE on CKD 3a - severe azotemia  likely due to GIB abd low BP, worsening   cc yesterday, on Bumex continue  Hyponatremia / hypokalemia  Acute anemia - UGIB  Troponemia  Lactic acidosis  DKA   Afib       2st HD today 2.5 hrs  3K bath Na 130 UF 0.5  - teixeira, strict i/o   - increase midodrine to 10mg q8h  - may need pressors if BP remians low  - discussed with patient's wife Patricio yesterdaywho agreed to HD  - on Vanco 1 g q48 and Cefepime  -please check Vanco trough level  -daily labs  will assess for HD tomorrow   -d/w pt's niece renal prognosis    -

## 2023-06-02 NOTE — DIETITIAN INITIAL EVALUATION ADULT - PERTINENT LABORATORY DATA
06-01    120<L>  |  80<L>  |  240<HH>  ----------------------------<  134<H>  4.0   |  16<L>  |  4.5<HH>    Ca    7.5<L>      01 Jun 2023 22:50  Phos  3.1     06-01  Mg     2.8     06-01    TPro  5.2<L>  /  Alb  1.8<L>  /  TBili  0.5  /  DBili  x   /  AST  15  /  ALT  <5  /  AlkPhos  151<H>  06-01  POCT Blood Glucose.: 232 mg/dL (06-02-23 @ 06:27)  A1C with Estimated Average Glucose Result: 6.2 % (05-31-23 @ 05:50)  A1C with Estimated Average Glucose Result: 5.7 % (10-05-22 @ 08:05)

## 2023-06-02 NOTE — PROGRESS NOTE ADULT - SUBJECTIVE AND OBJECTIVE BOX
ALICIA BARBOUR  63y  Male      Patient is a 63y old  Male who presents with a chief complaint of Abnormal labs (02 Jun 2023 11:10)      INTERVAL HPI/OVERNIGHT EVENTS:  No overnight events       T(C): 36.7 (06-02-23 @ 07:35), Max: 36.7 (06-02-23 @ 07:35)  HR: 79 (06-02-23 @ 07:35) (76 - 84)  BP: 98/55 (06-02-23 @ 07:35) (93/50 - 104/55)  RR: 18 (06-02-23 @ 07:35) (18 - 21)  SpO2: 100% (06-02-23 @ 07:35) (100% - 100%)  Wt(kg): --Vital Signs Last 24 Hrs  T(C): 36.7 (02 Jun 2023 07:35), Max: 36.7 (02 Jun 2023 07:35)  T(F): 98.1 (02 Jun 2023 07:35), Max: 98.1 (02 Jun 2023 07:35)  HR: 79 (02 Jun 2023 07:35) (76 - 84)  BP: 98/55 (02 Jun 2023 07:35) (93/50 - 104/55)  BP(mean): 71 (02 Jun 2023 07:35) (66 - 76)  RR: 18 (02 Jun 2023 07:35) (18 - 21)  SpO2: 100% (02 Jun 2023 07:35) (100% - 100%)    Parameters below as of 02 Jun 2023 07:35  Patient On (Oxygen Delivery Method): ventilator        PHYSICAL EXAM:  GENERAL: NAD, well-groomed, well-developed  NECK: Supple, No JVD, Normal thyroid  NERVOUS SYSTEM:  Arousable to touch, non verbal at baseline   CHEST/LUNG: Clear to percussion bilaterally; No rales, rhonchi, wheezing, or rubs  HEART: Regular rate and rhythm; No murmurs, rubs, or gallops  ABDOMEN: Soft, Nontender, Nondistended; Bowel sounds present  EXTREMITIES:  2+ Peripheral Pulses, No clubbing, cyanosis, or edema    Labs reviewed   Imaging Reviewed     acetaminophen     Tablet .. 650 milliGRAM(s) Oral every 6 hours PRN  albuterol/ipratropium for Nebulization 3 milliLiter(s) Nebulizer every 6 hours  aMIOdarone    Tablet 400 milliGRAM(s) Oral every 8 hours  aMIOdarone    Tablet   Oral   ascorbic acid 500 milliGRAM(s) Oral daily  aspirin  chewable 81 milliGRAM(s) Oral daily  atorvastatin 80 milliGRAM(s) Oral at bedtime  buMETAnide Injectable 2 milliGRAM(s) IV Push every 12 hours  cefepime   IVPB 1000 milliGRAM(s) IV Intermittent every 12 hours  chlorhexidine 0.12% Liquid 15 milliLiter(s) Oral Mucosa two times a day  chlorhexidine 2% Cloths 1 Application(s) Topical <User Schedule>  dextrose 5%. 1000 milliLiter(s) IV Continuous <Continuous>  dextrose 5%. 1000 milliLiter(s) IV Continuous <Continuous>  dextrose 50% Injectable 12.5 Gram(s) IV Push once  dextrose 50% Injectable 25 Gram(s) IV Push once  dextrose 50% Injectable 25 Gram(s) IV Push once  dextrose Oral Gel 15 Gram(s) Oral once PRN  ferrous    sulfate Liquid 300 milliGRAM(s) Enteral Tube daily  folic acid 1 milliGRAM(s) Oral daily  glucagon  Injectable 1 milliGRAM(s) IntraMuscular once  heparin   Injectable 5000 Unit(s) SubCutaneous every 12 hours  insulin lispro (ADMELOG) corrective regimen sliding scale   SubCutaneous three times a day before meals  levETIRAcetam  Solution 500 milliGRAM(s) Oral two times a day  lidocaine 2% Injectable 20 milliLiter(s) Local Injection once  midodrine. 10 milliGRAM(s) Oral three times a day  multivitamin 1 Tablet(s) Oral daily  pantoprazole    Tablet 40 milliGRAM(s) Oral before breakfast  silver sulfADIAZINE 1% Cream 1 Application(s) Topical every 12 hours  sodium bicarbonate 1300 milliGRAM(s) Oral every 8 hours  sodium chloride 0.9% Bolus. 100 milliLiter(s) IV Bolus every 5 minutes PRN  vancomycin  IVPB 1000 milliGRAM(s) IV Intermittent every 48 hours  zinc sulfate 220 milliGRAM(s) Oral daily      ASSESSMENT AND PLAN:    #MICHELLE (pre-renal Vs ATN) on CKD   #Fluid overload  #HAGMA 2/2 renal failure  #?Sepsis POA 2/2 UTI  - Cr 4.6 (baseline 1.1 in 10/22)  - non oliguric (has good urine output)   - RBUS: unremarkable  - c/w bumex 2mg iv q12hr  - c/w midodrine 10mg q8hr per nephro as BP is soft   - F/u TTE  - AG 26; bicarb 12; lactate trended down to normal  - c/w sodium bicarb 1300mg PO TID  - Follow up with nephro   - On abx: cefepime and vanc (renally dosed) for empiric coverage: dc when no longed needed  - UA equivocal for UTI (high WBC and no bacteria)  - BCx negative  - F/u UCx  - hemodialysis done yesterday with no improvement in metabolic profile - repeat planned for today     #Acute anemia  - HgB 6.7 (baseline 8) s/p 2U pRBC - improved to 9.9  - evaluated by GI - no evidence of GI bleed (PEG tube flushed with returin of bile and no blood)  - Monitor H/H closely  - Active T+S  - Goal HgB > 7    #Hyponatremia   - likely hypervolemic from acute kidney dysfunction Vs ?new CHF  - c/w bumex 2mg iv q12hr -per nephro    #Afib w/ RVR  - currently rate controlled s/p amio gtt  - c/w PO amio 400mg q8hr    #CAD s/p stents  #NSTEMI  - Trops 1.80-->1.63-->1.57-->1.55  - seen by cardio  - c/w asa and statin  - elevated trops likely from demand ischemia    #DKA  - BHB 2.2; AG 26 - f/u reorders   - started on insulin gtt when FS > 300 but patient's glucose started to drop to <100 so Insulin gtt was stopped and started tube feeds  - At this time elevated anion gap is more from underlying acute kidney dysfunction   - c/w insulin sliding scale  - Closely monitor finger sticks    #Hx ICH s/p trach and PEG  - c/w keppra 500mg BID for seizure prophylaxis    Diet: NPO with PEG feeds  DVT px - heparin  GI px - protonix.  DNR, on trach

## 2023-06-02 NOTE — DIETITIAN INITIAL EVALUATION ADULT - ORAL INTAKE PTA/DIET HISTORY
Pt unable to participate in nutrition assessment interview; trached, unable to speak, intubated. Hx limited to medical chart at this time.

## 2023-06-02 NOTE — PROGRESS NOTE ADULT - ASSESSMENT
IMPRESSION:    Sepsis on admission  Candida UTI  MICHELLE on CKD III on dialysis   NSTEMI likely type 2  Hyponatremia, improving   Paroxysmal Afib  Acute on chronic anemia suspected UGI Bleed  Chronic respiratory failure  H/o ICH s/p trach and PEG  H/o CAD      PLAN:    CNS: continue home meds     HEENT: Oral and trach care    PULMONARY: HOB 45. Aspiration, no vent changes, ABG PRN. Goal saturation 92-95%. HOB @ 45 degrees. monitor P/P/ DP    CARDIOVASCULAR: Avoid overload. 2D-Echo noted EF 63%. Continue Bumex 2mg BID.     GI: PEG feeds. bowel regimen     RENAL: trend CMP, Nephrology following     INFECTIOUS DISEASE: Follow up blood and urine cultures, Cefepime and Vancomycin, procalcitonin 2.38     HEMATOLOGICAL: Trend CMP, f/u LE doppler read     ENDOCRINE: Follow up FS. Insulin protocol if needed.    MUSCULOSKELETAL: Bedrest.    DNR    SDU monitoring

## 2023-06-02 NOTE — DIETITIAN INITIAL EVALUATION ADULT - PERTINENT MEDS FT
MEDICATIONS  (STANDING):  albuterol/ipratropium for Nebulization 3 milliLiter(s) Nebulizer every 6 hours  aMIOdarone    Tablet 400 milliGRAM(s) Oral every 8 hours  aMIOdarone    Tablet   Oral   ascorbic acid 500 milliGRAM(s) Oral daily  aspirin  chewable 81 milliGRAM(s) Oral daily  atorvastatin 80 milliGRAM(s) Oral at bedtime  buMETAnide Injectable 2 milliGRAM(s) IV Push every 12 hours  cefepime   IVPB 1000 milliGRAM(s) IV Intermittent every 12 hours  chlorhexidine 0.12% Liquid 15 milliLiter(s) Oral Mucosa two times a day  chlorhexidine 2% Cloths 1 Application(s) Topical <User Schedule>  dextrose 5%. 1000 milliLiter(s) (50 mL/Hr) IV Continuous <Continuous>  dextrose 5%. 1000 milliLiter(s) (100 mL/Hr) IV Continuous <Continuous>  dextrose 50% Injectable 25 Gram(s) IV Push once  dextrose 50% Injectable 25 Gram(s) IV Push once  dextrose 50% Injectable 12.5 Gram(s) IV Push once  ferrous    sulfate Liquid 300 milliGRAM(s) Enteral Tube daily  folic acid 1 milliGRAM(s) Oral daily  glucagon  Injectable 1 milliGRAM(s) IntraMuscular once  heparin   Injectable 5000 Unit(s) SubCutaneous every 12 hours  insulin lispro (ADMELOG) corrective regimen sliding scale   SubCutaneous three times a day before meals  levETIRAcetam  Solution 500 milliGRAM(s) Oral two times a day  lidocaine 2% Injectable 20 milliLiter(s) Local Injection once  midodrine. 10 milliGRAM(s) Oral three times a day  multivitamin 1 Tablet(s) Oral daily  pantoprazole    Tablet 40 milliGRAM(s) Oral before breakfast  silver sulfADIAZINE 1% Cream 1 Application(s) Topical every 12 hours  sodium bicarbonate 1300 milliGRAM(s) Oral every 8 hours  vancomycin  IVPB 1000 milliGRAM(s) IV Intermittent every 48 hours  zinc sulfate 220 milliGRAM(s) Oral daily    MEDICATIONS  (PRN):  acetaminophen     Tablet .. 650 milliGRAM(s) Oral every 6 hours PRN Temp greater or equal to 38C (100.4F), Mild Pain (1 - 3)  dextrose Oral Gel 15 Gram(s) Oral once PRN Blood Glucose LESS THAN 70 milliGRAM(s)/deciliter  sodium chloride 0.9% Bolus. 100 milliLiter(s) IV Bolus every 5 minutes PRN SBP LESS THAN or EQUAL to 80 mmHg

## 2023-06-02 NOTE — DIETITIAN INITIAL EVALUATION ADULT - OTHER INFO
Pertinent Medical Information: Per H&P, pt is a 62 y/o male with PMHx of ICH (2021) s/p trach and PEG, HTN, HLD, T2DM, CAD s/p stents, Recurrent C diff, BIBEMS from [facility] for abnormal labs.     Pertinent Subjective Information:     Weight hx: UBW unknown. Current dosing weight is 102 KG. Previous admission weight was Pertinent Medical Information: Per H&P, pt is a 64 y/o male with PMHx of ICH (2021) s/p trach and PEG, HTN, HLD, T2DM, CAD s/p stents, Recurrent C diff, BIBEMS from [facility] for abnormal labs.     Pertinent Subjective Information:     Weight hx: UBW unknown. Current dosing weight is 102 KG. Previous admission weight was 66.4 KG on 10/04/22; 34% unintentional weight gain in over  Pertinent Medical Information: Per H&P, pt is a 62 y/o male with PMHx of ICH (2021) s/p trach and PEG, HTN, HLD, T2DM, CAD s/p stents, Recurrent C diff, BIBEMS from [facility] for abnormal labs.     Weight hx: UBW unknown. Current dosing weight is 102 KG. Previous admission weight was 66.4 KG on 10/04/22; 34% unintentional weight gain in over 7 months compared 102 KG. Pt does not meet weight criteria for malnutrition at this time. Weight gain likely d/t edema.

## 2023-06-02 NOTE — PROGRESS NOTE ADULT - SUBJECTIVE AND OBJECTIVE BOX
Patient is a 63y old  Male who presents with a chief complaint of Acute renal failure (02 Jun 2023 07:14)        Over Night Events: no acute events overnight, remains vent dependant, SP Right IJ [place] [facility] yesterday      Vital Signs Last 24 Hrs  T(C): 36.7 (02 Jun 2023 07:35), Max: 36.7 (02 Jun 2023 07:35)  T(F): 98.1 (02 Jun 2023 07:35), Max: 98.1 (02 Jun 2023 07:35)  HR: 79 (02 Jun 2023 07:35) (74 - 84)  BP: 98/55 (02 Jun 2023 07:35) (93/50 - 108/57)  BP(mean): 71 (02 Jun 2023 07:35) (66 - 76)  RR: 18 (02 Jun 2023 07:35) (18 - 21)  SpO2: 100% (02 Jun 2023 07:35) (100% - 100%)    O2 Parameters below as of 02 Jun 2023 07:35  Patient On (Oxygen Delivery Method): ventilator        General: chronically ill looking  HEENT: trach          Lungs: Bilateral rhonchi  Cardiovascular: Regular   Abdomen: Soft, Positive BS  unresponsive      06-01-23 @ 07:01  -  06-02-23 @ 07:00  --------------------------------------------------------  IN:    dextrose 5% + sodium chloride 0.9% w/ Additives: 600 mL    IV PiggyBack: 50 mL    Jevity 1.2: 320 mL  Total IN: 970 mL    OUT:    Indwelling Catheter - Urethral (mL): 175 mL    Other (mL): 0 mL    Voided (mL): 250 mL  Total OUT: 425 mL    Total NET: 545 mL          LABS:                            9.9    13.27 )-----------( 403      ( 01 Jun 2023 07:17 )             30.3                                               06-01    120<L>  |  80<L>  |  240<HH>  ----------------------------<  134<H>  4.0   |  16<L>  |  4.5<HH>    Ca    7.5<L>      01 Jun 2023 22:50  Phos  3.1     06-01  Mg     2.8     06-01    TPro  5.2<L>  /  Alb  1.8<L>  /  TBili  0.5  /  DBili  x   /  AST  15  /  ALT  <5  /  AlkPhos  151<H>  06-01      PT/INR - ( 01 Jun 2023 12:04 )   PT: 16.10 sec;   INR: 1.40 ratio         PTT - ( 01 Jun 2023 12:04 )  PTT:34.5 sec                                           CARDIAC MARKERS ( 01 Jun 2023 07:17 )  x     / 1.55 ng/mL / x     / x     / x                                                LIVER FUNCTIONS - ( 01 Jun 2023 22:50 )  Alb: 1.8 g/dL / Pro: 5.2 g/dL / ALK PHOS: 151 U/L / ALT: <5 U/L / AST: 15 U/L / GGT: x                                                  Culture - Urine (collected 30 May 2023 21:15)  Source: Catheterized Catheterized  Final Report (01 Jun 2023 22:29):    >100,000 CFU/ml Candida tropicalis "Susceptibilities not performed"    Culture - Blood (collected 30 May 2023 20:09)  Source: .Blood Blood-Peripheral  Preliminary Report (01 Jun 2023 02:01):    No growth to date.    Culture - Blood (collected 30 May 2023 20:09)  Source: .Blood Blood-Peripheral  Preliminary Report (01 Jun 2023 02:01):    No growth to date.                                                   Mode: AC/ CMV (Assist Control/ Continuous Mandatory Ventilation)  RR (machine): 14  TV (machine): 450  FiO2: 40  PEEP: 8  ITime: 1  MAP: 11  PIP: 25                                      ABG - ( 01 Jun 2023 11:54 )  pH, Arterial: 7.39  pH, Blood: x     /  pCO2: 26    /  pO2: 130   / HCO3: 16    / Base Excess: -8.1  /  SaO2: 99.6                MEDICATIONS  (STANDING):  albuterol/ipratropium for Nebulization 3 milliLiter(s) Nebulizer every 6 hours  aMIOdarone    Tablet 400 milliGRAM(s) Oral every 8 hours  aMIOdarone    Tablet   Oral   ascorbic acid 500 milliGRAM(s) Oral daily  aspirin  chewable 81 milliGRAM(s) Oral daily  atorvastatin 80 milliGRAM(s) Oral at bedtime  buMETAnide Injectable 2 milliGRAM(s) IV Push every 12 hours  cefepime   IVPB 1000 milliGRAM(s) IV Intermittent every 12 hours  chlorhexidine 0.12% Liquid 15 milliLiter(s) Oral Mucosa two times a day  chlorhexidine 2% Cloths 1 Application(s) Topical <User Schedule>  dextrose 5%. 1000 milliLiter(s) (50 mL/Hr) IV Continuous <Continuous>  dextrose 5%. 1000 milliLiter(s) (100 mL/Hr) IV Continuous <Continuous>  dextrose 50% Injectable 25 Gram(s) IV Push once  dextrose 50% Injectable 25 Gram(s) IV Push once  dextrose 50% Injectable 12.5 Gram(s) IV Push once  ferrous    sulfate Liquid 300 milliGRAM(s) Enteral Tube daily  folic acid 1 milliGRAM(s) Oral daily  glucagon  Injectable 1 milliGRAM(s) IntraMuscular once  heparin   Injectable 5000 Unit(s) SubCutaneous every 12 hours  insulin lispro (ADMELOG) corrective regimen sliding scale   SubCutaneous three times a day before meals  levETIRAcetam  Solution 500 milliGRAM(s) Oral two times a day  lidocaine 2% Injectable 20 milliLiter(s) Local Injection once  midodrine. 10 milliGRAM(s) Oral three times a day  multivitamin 1 Tablet(s) Oral daily  pantoprazole    Tablet 40 milliGRAM(s) Oral before breakfast  silver sulfADIAZINE 1% Cream 1 Application(s) Topical every 12 hours  sodium bicarbonate 1300 milliGRAM(s) Oral every 8 hours  vancomycin  IVPB 1000 milliGRAM(s) IV Intermittent every 48 hours  zinc sulfate 220 milliGRAM(s) Oral daily    MEDICATIONS  (PRN):  acetaminophen     Tablet .. 650 milliGRAM(s) Oral every 6 hours PRN Temp greater or equal to 38C (100.4F), Mild Pain (1 - 3)  dextrose Oral Gel 15 Gram(s) Oral once PRN Blood Glucose LESS THAN 70 milliGRAM(s)/deciliter  sodium chloride 0.9% Bolus. 100 milliLiter(s) IV Bolus every 5 minutes PRN SBP LESS THAN or EQUAL to 80 mmHg      CXR reviewed

## 2023-06-02 NOTE — DIETITIAN INITIAL EVALUATION ADULT - NS FNS DIET ORDER
Diet, NPO with Tube Feed:   Tube Feeding Modality: Gastrostomy  Jevity 1.2 Blake  Total Volume for 24 Hours (mL): 640  Bolus  Total Volume of Bolus (mL):  160  Tube Feed Frequency: Every 6 hours   Tube Feed Start Time: 23:59  Bolus Feed Rate (mL per Hour): 213   Bolus Feed Duration (in Hours): 0.75 (06-01-23 @ 22:25)

## 2023-06-02 NOTE — PROGRESS NOTE ADULT - SUBJECTIVE AND OBJECTIVE BOX
ALICIA BARBOUR  63y Male    CHIEF COMPLAINT:    Patient is a 63y old  Male who presents with a chief complaint of Abnormal labs (2023 10:41)    INTERVAL HPI/OVERNIGHT EVENTS:    Patient seen and examined. No acute events overnight. Transferred from MICU, remains vented     ROS: All other systems are negative.    Vital Signs:    T(F): 98.1 (23 @ 07:35), Max: 98.1 (23 @ 07:35)  HR: 79 (23 @ 07:35) (76 - 84)  BP: 98/55 (23 @ 07:35) (93/50 - 104/55)  RR: 18 (23 @ 07:35) (18 - 21)  SpO2: 100% (23 @ 07:35) (100% - 100%)    2023 07:01  -  2023 07:00  --------------------------------------------------------  IN: 970 mL / OUT: 425 mL / NET: 545 mL    Daily Weight in k.3 (2023 00:00)    POCT Blood Glucose.: 232 mg/dL (2023 06:27)  POCT Blood Glucose.: 185 mg/dL (2023 21:20)  POCT Blood Glucose.: 152 mg/dL (2023 18:18)  POCT Blood Glucose.: 216 mg/dL (2023 11:30)    PHYSICAL EXAM:    GENERAL:  NAD chronically ill appearing   SKIN: No rashes or lesions  HEENT: Atraumatic. Normocephalic.    NECK: Supple, No JVD.    PULMONARY: decreased breath sounds B/L. No wheezing.   CVS: Normal S1, S2. Rate and Rhythm are regular.   ABDOMEN/GI: Soft, Nontender, Nondistended   MSK:  No clubbing or cyanosis Contracted   NEUROLOGIC: does not follow commands   PSYCH: lethargic     Consultant(s) Notes Reviewed:  [x ] YES  [ ] NO  Care Discussed with Consultants/Other Providers [ x] YES  [ ] NO    LABS:                        9.9    13.27 )-----------( 403      ( 2023 07:17 )             30.3     120<L>  |  80<L>  |  240<HH>  ----------------------------<  134<H>  4.0   |  16<L>  |  4.5<HH>    Ca    7.5<L>      2023 22:50  Phos  3.1       Mg     2.8         TPro  5.2<L>  /  Alb  1.8<L>  /  TBili  0.5  /  DBili  x   /  AST  15  /  ALT  <5  /  AlkPhos  151<H>      PT/INR - ( 2023 12:04 )   PT: 16.10 sec;   INR: 1.40 ratio      PTT - ( 2023 12:04 )  PTT:34.5 sec    Trop 1.55, CKMB --, CK --, 23 @ 07:17  Trop 1.57, CKMB --, CK --, 23 @ 05:50  Trop 1.63, CKMB --, CK --, 23 @ 03:05  Trop 1.80, CKMB --, CK --, 23 @ 20:08    Culture - Urine (collected 30 May 2023 21:15)  Source: Catheterized Catheterized  Final Report (2023 22:29):    >100,000 CFU/ml Candida tropicalis "Susceptibilities not performed"    Culture - Blood (collected 30 May 2023 20:09)  Source: .Blood Blood-Peripheral  Preliminary Report (2023 02:01):    No growth to date.    Culture - Blood (collected 30 May 2023 20:09)  Source: .Blood Blood-Peripheral  Preliminary Report (2023 02:01):    No growth to date.    RADIOLOGY & ADDITIONAL TESTS:  Imaging or report Personally Reviewed:  [x] YES  [ ] NO  EKG reviewed: [x] YES  [ ] NO    Medications:  Standing  albuterol/ipratropium for Nebulization 3 milliLiter(s) Nebulizer every 6 hours  aMIOdarone    Tablet 400 milliGRAM(s) Oral every 8 hours  aMIOdarone    Tablet   Oral   ascorbic acid 500 milliGRAM(s) Oral daily  aspirin  chewable 81 milliGRAM(s) Oral daily  atorvastatin 80 milliGRAM(s) Oral at bedtime  buMETAnide Injectable 2 milliGRAM(s) IV Push every 12 hours  cefepime   IVPB 1000 milliGRAM(s) IV Intermittent every 12 hours  chlorhexidine 0.12% Liquid 15 milliLiter(s) Oral Mucosa two times a day  chlorhexidine 2% Cloths 1 Application(s) Topical <User Schedule>  dextrose 5%. 1000 milliLiter(s) IV Continuous <Continuous>  dextrose 5%. 1000 milliLiter(s) IV Continuous <Continuous>  dextrose 50% Injectable 25 Gram(s) IV Push once  dextrose 50% Injectable 25 Gram(s) IV Push once  dextrose 50% Injectable 12.5 Gram(s) IV Push once  ferrous    sulfate Liquid 300 milliGRAM(s) Enteral Tube daily  folic acid 1 milliGRAM(s) Oral daily  glucagon  Injectable 1 milliGRAM(s) IntraMuscular once  heparin   Injectable 5000 Unit(s) SubCutaneous every 12 hours  insulin lispro (ADMELOG) corrective regimen sliding scale   SubCutaneous three times a day before meals  levETIRAcetam  Solution 500 milliGRAM(s) Oral two times a day  lidocaine 2% Injectable 20 milliLiter(s) Local Injection once  midodrine. 10 milliGRAM(s) Oral three times a day  multivitamin 1 Tablet(s) Oral daily  pantoprazole    Tablet 40 milliGRAM(s) Oral before breakfast  silver sulfADIAZINE 1% Cream 1 Application(s) Topical every 12 hours  sodium bicarbonate 1300 milliGRAM(s) Oral every 8 hours  vancomycin  IVPB 1000 milliGRAM(s) IV Intermittent every 48 hours  zinc sulfate 220 milliGRAM(s) Oral daily    PRN Meds  acetaminophen     Tablet .. 650 milliGRAM(s) Oral every 6 hours PRN  dextrose Oral Gel 15 Gram(s) Oral once PRN  sodium chloride 0.9% Bolus. 100 milliLiter(s) IV Bolus every 5 minutes PRN

## 2023-06-02 NOTE — DIETITIAN INITIAL EVALUATION ADULT - REASON INDICATOR FOR ASSESSMENT
MST score 2 or more + pressure injury stage 2 or more MST score 2 or more + pressure injury stage 2 or more + TF prior to admit

## 2023-06-02 NOTE — DIETITIAN INITIAL EVALUATION ADULT - NSFNSGIIOFT_GEN_A_CORE
06-01-23 @ 07:01  -  06-02-23 @ 07:00  --------------------------------------------------------  OUT:  Total OUT: 0 mL    Total NET: 320 mL       Last BM on 6/2 06-01-23 @ 07:01  -  06-02-23 @ 07:00  --------------------------------------------------------  OUT:  Total OUT: 0 mL    Total NET: 320 mL

## 2023-06-02 NOTE — DIETITIAN INITIAL EVALUATION ADULT - OTHER CALCULATIONS
Using 84.2 KG (average weight using 66.4 KG and 102 KG d/t edema 4+; need to monitor weights): ENERGY: PSE 1848.73 (Tmax 36.7; Ve 10.7) vs. 2558-7619 kcal/day (14-17 kcal/kg); PROTEIN: 101-126 g/day kcal/day (1.2-1.5 g/kg); FLUID: 2105 mL/day (25 mL/kg) or per LIP -- with consideration for age, BMI, HD, edema 4+; multiple PI, critical care

## 2023-06-02 NOTE — DIETITIAN INITIAL EVALUATION ADULT - NSFNSPHYEXAMSKINFT_GEN_A_CORE
Pressure Injury 1: R outer heel, Suspected deep tissue injury  Pressure Injury 2: Right:, outer ankle, Suspected deep tissue injury  Pressure Injury 3: sacrum, Unstageable  Pressure Injury 4: Left:, buttock, Unstageable  Pressure Injury 5: R buttock, Unstageable  Pressure Injury 6: none, none  Pressure Injury 7: none, none  Pressure Injury 8: none, none  Pressure Injury 9: none, none  Pressure Injury 10: none, none  Pressure Injury 11: none, none Per flow sheet:  Pressure Injury 1: R outer heel, Suspected deep tissue injury  Pressure Injury 2: Right:, outer ankle, Suspected deep tissue injury  Pressure Injury 3: sacrum, Unstageable  Pressure Injury 4: Left:, buttock, Unstageable  Pressure Injury 5: R buttock, Unstageable

## 2023-06-02 NOTE — DIETITIAN INITIAL EVALUATION ADULT - ADD RECOMMEND
1. Recommend to adjust TF regimen via PEG to: Peptamen AF, total volume of 1200 mL, bolus of 300 mL q6hrs. Additional water flushes of 100 mL pre + post feeds. TF regimen provides 1440 kcal, 91.2 g pro, 1774.4 mL water with additional flushes. Regimen meets >85 - <105% of estimated needs. Team to adjust water flushes prn.   2. Maintain aspiration precautions  3. Supplement with MVI daily; 500 mg VIT C daily; 220 mg ZINC SULFATE (10-14 days only) to aid in wound healing if medically feasible    Pt is at high nutrition risk; will f/u in 3-5 days or prn.

## 2023-06-02 NOTE — PROGRESS NOTE ADULT - ATTENDING COMMENTS
IMPRESSION:    Sepsis on admission  Candida UTI  MICHELLE on CKD III on dialysis   NSTEMI likely type 2  Hyponatremia, improving   Paroxysmal Afib  Acute on chronic anemia suspected UGI Bleed  Chronic respiratory failure  H/o ICH s/p trach and PEG  H/o CAD    Plan as outlined above

## 2023-06-03 NOTE — PROGRESS NOTE ADULT - ASSESSMENT
IMPRESSION:    Sepsis on admission  Candida UTI  MICHELLE on CKD III on dialysis   NSTEMI likely type 2  Hyponatremia, improving   Paroxysmal Afib  Acute on chronic anemia suspected UGI Bleed  Chronic respiratory failure  H/o ICH s/p trach and PEG  H/o CAD      PLAN:    CNS: continue home meds     HEENT: Oral and trach care    PULMONARY: HOB 45. Aspiration, no vent changes, ABG PRN. Goal saturation 92-95%. HOB @ 45 degrees. monitor P/P/ DP    CARDIOVASCULAR: Avoid overload. 2D-Echo noted EF 63%. Continue Bumex 2mg BID.     GI: PEG feeds. bowel regimen     RENAL: trend CMP, Nephrology following     INFECTIOUS DISEASE: Follow up blood and urine cultures, Cefepime and Vancomycin, procalcitonin 2.38     HEMATOLOGICAL: Trend CMP, f/u LE doppler read     ENDOCRINE: Follow up FS. Insulin protocol if needed.    MUSCULOSKELETAL: Bedrest.    DNR    Downgrade to Vent Unit  IMPRESSION:    Sepsis on admission  Candida UTI  MICHELLE on CKD III on dialysis   NSTEMI likely type 2  Hyponatremia, improving   Paroxysmal Afib  Acute on chronic anemia suspected UGI Bleed  Chronic respiratory failure  H/o ICH s/p trach and PEG  H/o CAD      PLAN:    CNS: continue home meds     HEENT: Oral and trach care    PULMONARY: HOB 45. Aspiration, no vent changes, ABG PRN. Goal saturation 92-95%. HOB @ 45 degrees. monitor P/P/ DP, not weanable    CARDIOVASCULAR: Avoid overload. 2D-Echo noted EF 63%. Continue Bumex 2mg BID.     GI: PEG feeds. bowel regimen     RENAL: trend CMP, Nephrology following     INFECTIOUS DISEASE: Follow up blood and urine cultures, Cefepime and Vancomycin, procalcitonin 2.38     HEMATOLOGICAL: Trend CMP, f/u LE doppler read     ENDOCRINE: Follow up FS. Insulin protocol if needed.    MUSCULOSKELETAL: Bedrest.    DNR    Downgrade to Vent Unit

## 2023-06-03 NOTE — CONSULT NOTE ADULT - COMMENTS
unable to obtain history secondary to patient's mental status and/or sedation   alert, non verbal, does not follow commands  fio2 40%  R IJ [place]

## 2023-06-03 NOTE — CONSULT NOTE ADULT - SUBJECTIVE AND OBJECTIVE BOX
ALICIA BARBOUR  63y, Male  Allergy: penicillin (Other)      All historical available data reviewed.    HPI:  62 yo M with  PMH of ICH (2021) s/p trach and PEG, HTN, HLD, T2DM, CAD s/p stents, Recurrent C diff, BIBEMS from [facility] for abnormal labs. Patient non-verbal at baseline - limited history. Per NH chart  have a BUN greater than 200 and creatinine of 4.3 on outpatient labs. Outpatient CXR also w/ new L sided pleural effusion. With EMS patient was also found to be in irregular rhythm, no known history of A-fib or a flutter  In ED patient had borderline BP, and was in Atrial fibrillation. He was started on amiodarone gtt. Labs showed , Cr. 4.1, Hb 6.6. Trops 1.8. He was given 1U PRBC, gastric lavage revealed coffee ground emesis w/no active bleeding. He was started on PPI drip and GI consulted in ED. He was given IV aztreonam and vancomycin.   Patient being admitted to ICU for management of Sepsis likely from UTI, MICHELLE likely prerenal and NSTEMI.    (30 May 2023 23:39)  ID called for candiduria  FAMILY HISTORY:    PAST MEDICAL & SURGICAL HISTORY:  HTN (hypertension)      Diabetes mellitus      H/O intracranial hemorrhage      H/O tracheostomy      PEG (percutaneous endoscopic gastrostomy) status      Hyperlipidemia            VITALS:  T(F): 97.6, Max: 98.1 (06-02-23 @ 07:35)  HR: 82  BP: 136/76  RR: 20Vital Signs Last 24 Hrs  T(C): 36.4 (03 Jun 2023 04:33), Max: 36.7 (02 Jun 2023 07:35)  T(F): 97.6 (03 Jun 2023 04:33), Max: 98.1 (02 Jun 2023 07:35)  HR: 82 (03 Jun 2023 04:36) (73 - 91)  BP: 136/76 (03 Jun 2023 04:33) (95/53 - 136/76)  BP(mean): 74 (03 Jun 2023 00:00) (70 - 78)  RR: 20 (03 Jun 2023 04:33) (18 - 20)  SpO2: 100% (03 Jun 2023 04:36) (98% - 100%)    Parameters below as of 03 Jun 2023 00:00  Patient On (Oxygen Delivery Method): ventilator    O2 Concentration (%): 40    TESTS & MEASUREMENTS:                        9.9    13.27 )-----------( 403      ( 01 Jun 2023 07:17 )             30.3     06-02    129<L>  |  91<L>  |  130<HH>  ----------------------------<  148<H>  3.4<L>   |  19  |  2.8<H>    Ca    7.5<L>      02 Jun 2023 19:45  Phos  2.1     06-02  Mg     2.8     06-01    TPro  5.2<L>  /  Alb  1.8<L>  /  TBili  0.5  /  DBili  x   /  AST  15  /  ALT  <5  /  AlkPhos  151<H>  06-01    LIVER FUNCTIONS - ( 01 Jun 2023 22:50 )  Alb: 1.8 g/dL / Pro: 5.2 g/dL / ALK PHOS: 151 U/L / ALT: <5 U/L / AST: 15 U/L / GGT: x             Culture - Urine (collected 05-30-23 @ 21:15)  Source: Catheterized Catheterized  Final Report (06-01-23 @ 22:29):    >100,000 CFU/ml Candida tropicalis "Susceptibilities not performed"    Culture - Blood (collected 05-30-23 @ 20:09)  Source: .Blood Blood-Peripheral  Preliminary Report (06-01-23 @ 02:01):    No growth to date.    Culture - Blood (collected 05-30-23 @ 20:09)  Source: .Blood Blood-Peripheral  Preliminary Report (06-01-23 @ 02:01):    No growth to date.            RADIOLOGY & ADDITIONAL TESTS:  Personal review of radiological diagnostics performed  Echo and EKG results noted when applicable.     MEDICATIONS:  acetaminophen     Tablet .. 650 milliGRAM(s) Oral every 6 hours PRN  albuterol/ipratropium for Nebulization 3 milliLiter(s) Nebulizer every 6 hours  aMIOdarone    Tablet   Oral   aMIOdarone    Tablet 400 milliGRAM(s) Oral every 8 hours  ascorbic acid 500 milliGRAM(s) Oral daily  aspirin  chewable 81 milliGRAM(s) Oral daily  atorvastatin 80 milliGRAM(s) Oral at bedtime  buMETAnide Injectable 2 milliGRAM(s) IV Push every 12 hours  chlorhexidine 0.12% Liquid 15 milliLiter(s) Oral Mucosa two times a day  chlorhexidine 2% Cloths 1 Application(s) Topical <User Schedule>  dextrose 5%. 1000 milliLiter(s) IV Continuous <Continuous>  dextrose 5%. 1000 milliLiter(s) IV Continuous <Continuous>  dextrose 50% Injectable 25 Gram(s) IV Push once  dextrose 50% Injectable 25 Gram(s) IV Push once  dextrose 50% Injectable 12.5 Gram(s) IV Push once  dextrose Oral Gel 15 Gram(s) Oral once PRN  ferrous    sulfate Liquid 300 milliGRAM(s) Enteral Tube daily  fluconAZOLE IVPB 200 milliGRAM(s) IV Intermittent every 24 hours  folic acid 1 milliGRAM(s) Oral daily  glucagon  Injectable 1 milliGRAM(s) IntraMuscular once  heparin   Injectable 5000 Unit(s) SubCutaneous every 12 hours  insulin lispro (ADMELOG) corrective regimen sliding scale   SubCutaneous three times a day before meals  levETIRAcetam  Solution 500 milliGRAM(s) Oral two times a day  lidocaine 2% Injectable 20 milliLiter(s) Local Injection once  midodrine. 10 milliGRAM(s) Oral three times a day  multivitamin 1 Tablet(s) Oral daily  pantoprazole    Tablet 40 milliGRAM(s) Oral before breakfast  silver sulfADIAZINE 1% Cream 1 Application(s) Topical every 12 hours  sodium bicarbonate 1300 milliGRAM(s) Oral every 8 hours  sodium chloride 0.9% Bolus. 100 milliLiter(s) IV Bolus every 5 minutes PRN  zinc sulfate 220 milliGRAM(s) Oral daily      ANTIBIOTICS:  fluconAZOLE IVPB 200 milliGRAM(s) IV Intermittent every 24 hours

## 2023-06-03 NOTE — PROGRESS NOTE ADULT - ASSESSMENT
64 yo M with  PMH of ICH (2021) s/p trach and PEG, HTN, HLD, T2DM, CAD s/p stents, Recurrent C diff, BIBEMS from [facility] for abnormal labs. Patient non-verbal at baseline - limited history. Per NH chart  have a BUN greater than 200 and creatinine of 4.3 on outpatient labs. Outpatient CXR also w/ new L sided pleural effusion. In ED, patient was hypotensive with Afib with RVR, found to be in acure renal failure, anemic with elevated trops. He was started on Amiodarone GTT, PPI GTT, gastric lavage with coffee ground secretions, started on broad spectrum abx and admitted to MICU  While in MICU, found to be in DKA, s/p insulin drip, MICHELLE started on HD, acute anemia s/p PRBC transfusion, NSTEMI with downtrending trops, now downgraded to SDU    Acute Renal Failure, started on HD 6/1  Fluid Overload  HAGMA  - non oliguric, RBUS without hydro   - started on HD 6/1 via R IJ [place]   - c/w bumex 2mg iv q12hr, HD per renal  - c/w midodrine 10mg q8hr  - c/w sodium bicarb 1300mg PO TID  - F/u TTE  - nephrology following     Candida tropicalis UTI  s/p cefepime and vanc   Blood clts neg, URine with Candida tropicalis   on  fluconazole, ID followcing, can change from IV to PO via gtube   ID eval    Acute anemia  - HgB 6.7 (baseline 8) s/p 2U pRBC - improved to 9.9  - evaluated by GI - no evidence of GI bleed (PEG tube flushed with return of bile and no blood)  - Monitor H/H closely  - Active T+S    Hyponatremia, likely hypervolemic  - c/w bumex 2mg iv q12hr and HD per nephro  - daily BMP    Afib w/ RVR - now rate controlled   - currently rate controlled s/p amio gtt  - change amio to 200 q24H     CAD s/p stents  NSTEMI  - Trops 1.80-->1.63-->1.57-->1.55  - seen by cardio,  c/w asa and statin, medical management     DKA, resolved  - s/p insulin drip, now on basal/bolus and tube feeds     Hx ICH s/p trach and PEG  Bedbound from NH   - c/w keppra 500mg BID for seizure prophylaxis    DNR only, overall prognosis is poor, continue monitoring in SDU

## 2023-06-03 NOTE — PROGRESS NOTE ADULT - SUBJECTIVE AND OBJECTIVE BOX
ALICIA BARBOUR  63y Male    CHIEF COMPLAINT:    Patient is a 63y old  Male who presents with a chief complaint of Abnormal labs (2023 08:03)    INTERVAL HPI/OVERNIGHT EVENTS:    Patient seen and examined. No acute events overnight. OVerall unchanged, remains vented     ROS: All other systems are negative.    Vital Signs:    T(F): 97.4 (23 @ 11:37), Max: 97.8 (23 @ 00:00)  HR: 76 (23 @ 11:37) (73 - 91)  BP: 101/63 (23 @ 11:37) (99/59 - 136/76)  RR: 20 (23 @ 11:37) (19 - 20)  SpO2: 99% (23 @ 11:37) (98% - 100%)    2023 07:01  -  2023 07:00  --------------------------------------------------------  IN: 870 mL / OUT: 1300 mL / NET: -430 mL    2023 07:01  -  2023 12:55  --------------------------------------------------------  IN: 385 mL / OUT: 0 mL / NET: 385 mL      Daily Weight in k.3 (2023 04:33)    POCT Blood Glucose.: 265 mg/dL (2023 11:19)  POCT Blood Glucose.: 204 mg/dL (2023 07:40)  POCT Blood Glucose.: 201 mg/dL (2023 06:23)  POCT Blood Glucose.: 221 mg/dL (2023 21:24)  POCT Blood Glucose.: 183 mg/dL (2023 16:17)    PHYSICAL EXAM:    GENERAL:  NAD chronically ill appearing   SKIN: No rashes or lesions  HEENT: Atraumatic. Normocephalic.   NECK: Supple, No JVD.    PULMONARY:decreased breath sounds B/L. No wheezing   CVS: Normal S1, S2. Rate and Rhythm are regular   ABDOMEN/GI: Soft, Nontender, Nondistended   MSK:  contracted  NEUROLOGIC: does nto follow commands  PSYCH: obtunded    Consultant(s) Notes Reviewed:  [x ] YES  [ ] NO  Care Discussed with Consultants/Other Providers [ x] YES  [ ] NO    LABS:                        9.0    12.91 )-----------( 339      ( 2023 06:50 )             26.9     126<L>  |  89<L>  |  140<HH>  ----------------------------<  194<H>  3.5   |  19  |  3.0<H>    Ca    7.5<L>      2023 06:50  Phos  2.2     06-  Mg     2.3     -    TPro  4.9<L>  /  Alb  1.4<L>  /  TBili  0.4  /  DBili  x   /  AST  19  /  ALT  <5  /  AlkPhos  158<H>      Trop 1.55, CKMB --, CK --, 23 @ 07:17    RADIOLOGY & ADDITIONAL TESTS:  Imaging or report Personally Reviewed:  [x] YES  [ ] NO  EKG reviewed: [x] YES  [ ] NO    Medications:  Standing  albuterol/ipratropium for Nebulization 3 milliLiter(s) Nebulizer every 6 hours  aMIOdarone    Tablet   Oral   aMIOdarone    Tablet 400 milliGRAM(s) Oral every 8 hours  ascorbic acid 500 milliGRAM(s) Oral daily  aspirin  chewable 81 milliGRAM(s) Oral daily  atorvastatin 80 milliGRAM(s) Oral at bedtime  buMETAnide Injectable 2 milliGRAM(s) IV Push every 12 hours  chlorhexidine 0.12% Liquid 15 milliLiter(s) Oral Mucosa two times a day  chlorhexidine 2% Cloths 1 Application(s) Topical <User Schedule>  dextrose 5%. 1000 milliLiter(s) IV Continuous <Continuous>  dextrose 5%. 1000 milliLiter(s) IV Continuous <Continuous>  dextrose 50% Injectable 25 Gram(s) IV Push once  dextrose 50% Injectable 25 Gram(s) IV Push once  dextrose 50% Injectable 12.5 Gram(s) IV Push once  ferrous    sulfate Liquid 300 milliGRAM(s) Enteral Tube daily  fluconAZOLE IVPB 200 milliGRAM(s) IV Intermittent every 24 hours  folic acid 1 milliGRAM(s) Oral daily  glucagon  Injectable 1 milliGRAM(s) IntraMuscular once  heparin   Injectable 5000 Unit(s) SubCutaneous every 12 hours  insulin lispro (ADMELOG) corrective regimen sliding scale   SubCutaneous three times a day before meals  levETIRAcetam  Solution 500 milliGRAM(s) Oral two times a day  lidocaine 2% Injectable 20 milliLiter(s) Local Injection once  midodrine. 10 milliGRAM(s) Oral three times a day  multivitamin 1 Tablet(s) Oral daily  pantoprazole    Tablet 40 milliGRAM(s) Oral before breakfast  silver sulfADIAZINE 1% Cream 1 Application(s) Topical every 12 hours  sodium bicarbonate 1300 milliGRAM(s) Oral every 8 hours  zinc sulfate 220 milliGRAM(s) Oral daily    PRN Meds  acetaminophen     Tablet .. 650 milliGRAM(s) Oral every 6 hours PRN  dextrose Oral Gel 15 Gram(s) Oral once PRN  sodium chloride 0.9% Bolus. 100 milliLiter(s) IV Bolus every 5 minutes PRN

## 2023-06-03 NOTE — CONSULT NOTE ADULT - ASSESSMENT
64 yo M with  PMH of ICH (2021) s/p trach and PEG, HTN, HLD, T2DM, CAD s/p stents, Recurrent C diff, BIBEMS from [facility] for abnormal labs. Patient non-verbal at baseline - limited history. Per NH chart  have a BUN greater than 200 and creatinine of 4.3 on outpatient labs. Outpatient CXR also w/ new L sided pleural effusion. With EMS patient was also found to be in irregular rhythm, no known history of A-fib or a flutter.    IMPRESSION/RECOMMENDATIONS  62 yo M with  PMH of ICH (2021) s/p trach and PEG, HTN, HLD, T2DM, CAD s/p stents, Recurrent C diff, BIBEMS from [facility] for abnormal labs. Patient non-verbal at baseline - limited history. Per NH chart  have a BUN greater than 200 and creatinine of 4.3 on outpatient labs. Outpatient CXR also w/ new L sided pleural effusion. With EMS patient was also found to be in irregular rhythm, no known history of A-fib or a flutter.    IMPRESSION/RECOMMENDATIONS   Candiduria with UCX C tropicalis  Generally contaminant with no evidence of fungus balls but as urine is dark will recommend treating for now  5/30 BCx NG  WBC 13.2  -Diflucan via G tube 200 mg q24h for 12 more days  Off loading to prevent pressure sores and preventive measures to avoid aspiration  Please do not hesitate to recall ID if any questions arise either through [employer] or through [employer]

## 2023-06-03 NOTE — PROGRESS NOTE ADULT - SUBJECTIVE AND OBJECTIVE BOX
Patient is a 63y old  Male who presents with a chief complaint of Abnormal labs (03 Jun 2023 07:30)        Over Night Events: remains vent dependent, no acute events overnight     Vital Signs Last 24 Hrs  T(C): 36.2 (03 Jun 2023 08:01), Max: 36.6 (03 Jun 2023 00:00)  T(F): 97.2 (03 Jun 2023 08:01), Max: 97.8 (03 Jun 2023 00:00)  HR: 74 (03 Jun 2023 08:01) (73 - 91)  BP: 99/60 (03 Jun 2023 08:01) (95/53 - 136/76)  BP(mean): 75 (03 Jun 2023 08:01) (70 - 78)  RR: 20 (03 Jun 2023 08:01) (18 - 20)  SpO2: 98% (03 Jun 2023 08:01) (98% - 100%)    O2 Parameters below as of 03 Jun 2023 08:01  Patient On (Oxygen Delivery Method): ventilator          General: chronically ill looking  HEENT: trach          Lungs: Bilateral rhonchi  Cardiovascular: Regular   Abdomen: Soft, Positive BS  unresponsive      06-02-23 @ 07:01  -  06-03-23 @ 07:00  --------------------------------------------------------  IN:    Enteral Tube Flush: 120 mL    Peptamen A.F.: 750 mL  Total IN: 870 mL    OUT:    Other (mL): 500 mL    Voided (mL): 800 mL  Total OUT: 1300 mL    Total NET: -430 mL          LABS:                            9.0    12.91 )-----------( 339      ( 03 Jun 2023 06:50 )             26.9                                               06-02    129<L>  |  91<L>  |  130<HH>  ----------------------------<  148<H>  3.4<L>   |  19  |  2.8<H>    Ca    7.5<L>      02 Jun 2023 19:45  Phos  2.1     06-02    TPro  5.2<L>  /  Alb  1.8<L>  /  TBili  0.5  /  DBili  x   /  AST  15  /  ALT  <5  /  AlkPhos  151<H>  06-01      PT/INR - ( 01 Jun 2023 12:04 )   PT: 16.10 sec;   INR: 1.40 ratio         PTT - ( 01 Jun 2023 12:04 )  PTT:34.5 sec                                                                                     LIVER FUNCTIONS - ( 01 Jun 2023 22:50 )  Alb: 1.8 g/dL / Pro: 5.2 g/dL / ALK PHOS: 151 U/L / ALT: <5 U/L / AST: 15 U/L / GGT: x                                                                                               Mode: AC/ CMV (Assist Control/ Continuous Mandatory Ventilation)  RR (machine): 14  TV (machine): 450  FiO2: 40  PEEP: 8  ITime: 0.9  MAP: 11  PIP: 28                                      ABG - ( 01 Jun 2023 11:54 )  pH, Arterial: 7.39  pH, Blood: x     /  pCO2: 26    /  pO2: 130   / HCO3: 16    / Base Excess: -8.1  /  SaO2: 99.6                MEDICATIONS  (STANDING):  albuterol/ipratropium for Nebulization 3 milliLiter(s) Nebulizer every 6 hours  aMIOdarone    Tablet 400 milliGRAM(s) Oral every 8 hours  aMIOdarone    Tablet   Oral   ascorbic acid 500 milliGRAM(s) Oral daily  aspirin  chewable 81 milliGRAM(s) Oral daily  atorvastatin 80 milliGRAM(s) Oral at bedtime  buMETAnide Injectable 2 milliGRAM(s) IV Push every 12 hours  chlorhexidine 0.12% Liquid 15 milliLiter(s) Oral Mucosa two times a day  chlorhexidine 2% Cloths 1 Application(s) Topical <User Schedule>  dextrose 5%. 1000 milliLiter(s) (100 mL/Hr) IV Continuous <Continuous>  dextrose 5%. 1000 milliLiter(s) (50 mL/Hr) IV Continuous <Continuous>  dextrose 50% Injectable 25 Gram(s) IV Push once  dextrose 50% Injectable 25 Gram(s) IV Push once  dextrose 50% Injectable 12.5 Gram(s) IV Push once  ferrous    sulfate Liquid 300 milliGRAM(s) Enteral Tube daily  fluconAZOLE IVPB 200 milliGRAM(s) IV Intermittent every 24 hours  folic acid 1 milliGRAM(s) Oral daily  glucagon  Injectable 1 milliGRAM(s) IntraMuscular once  heparin   Injectable 5000 Unit(s) SubCutaneous every 12 hours  insulin lispro (ADMELOG) corrective regimen sliding scale   SubCutaneous three times a day before meals  levETIRAcetam  Solution 500 milliGRAM(s) Oral two times a day  lidocaine 2% Injectable 20 milliLiter(s) Local Injection once  midodrine. 10 milliGRAM(s) Oral three times a day  multivitamin 1 Tablet(s) Oral daily  pantoprazole    Tablet 40 milliGRAM(s) Oral before breakfast  silver sulfADIAZINE 1% Cream 1 Application(s) Topical every 12 hours  sodium bicarbonate 1300 milliGRAM(s) Oral every 8 hours  zinc sulfate 220 milliGRAM(s) Oral daily    MEDICATIONS  (PRN):  acetaminophen     Tablet .. 650 milliGRAM(s) Oral every 6 hours PRN Temp greater or equal to 38C (100.4F), Mild Pain (1 - 3)  dextrose Oral Gel 15 Gram(s) Oral once PRN Blood Glucose LESS THAN 70 milliGRAM(s)/deciliter  sodium chloride 0.9% Bolus. 100 milliLiter(s) IV Bolus every 5 minutes PRN SBP LESS THAN or EQUAL to 80 mmHg    CXR reviewed

## 2023-06-03 NOTE — PROGRESS NOTE ADULT - SUBJECTIVE AND OBJECTIVE BOX
seen and examined  24 h events noted   trach/vent         PAST HISTORY  --------------------------------------------------------------------------------  No significant changes to PMH, PSH, FHx, SHx, unless otherwise noted    ALLERGIES & MEDICATIONS  --------------------------------------------------------------------------------  Allergies    penicillin (Other)    Intolerances      Standing Inpatient Medications  albuterol/ipratropium for Nebulization 3 milliLiter(s) Nebulizer every 6 hours  aMIOdarone    Tablet   Oral   aMIOdarone    Tablet 400 milliGRAM(s) Oral every 8 hours  ascorbic acid 500 milliGRAM(s) Oral daily  aspirin  chewable 81 milliGRAM(s) Oral daily  atorvastatin 80 milliGRAM(s) Oral at bedtime  buMETAnide Injectable 2 milliGRAM(s) IV Push every 12 hours  chlorhexidine 0.12% Liquid 15 milliLiter(s) Oral Mucosa two times a day  chlorhexidine 2% Cloths 1 Application(s) Topical <User Schedule>  dextrose 5%. 1000 milliLiter(s) IV Continuous <Continuous>  dextrose 5%. 1000 milliLiter(s) IV Continuous <Continuous>  dextrose 50% Injectable 25 Gram(s) IV Push once  dextrose 50% Injectable 25 Gram(s) IV Push once  dextrose 50% Injectable 12.5 Gram(s) IV Push once  ferrous    sulfate Liquid 300 milliGRAM(s) Enteral Tube daily  fluconAZOLE IVPB 200 milliGRAM(s) IV Intermittent every 24 hours  folic acid 1 milliGRAM(s) Oral daily  glucagon  Injectable 1 milliGRAM(s) IntraMuscular once  heparin   Injectable 5000 Unit(s) SubCutaneous every 12 hours  insulin lispro (ADMELOG) corrective regimen sliding scale   SubCutaneous three times a day before meals  levETIRAcetam  Solution 500 milliGRAM(s) Oral two times a day  lidocaine 2% Injectable 20 milliLiter(s) Local Injection once  midodrine. 10 milliGRAM(s) Oral three times a day  multivitamin 1 Tablet(s) Oral daily  pantoprazole    Tablet 40 milliGRAM(s) Oral before breakfast  silver sulfADIAZINE 1% Cream 1 Application(s) Topical every 12 hours  sodium bicarbonate 1300 milliGRAM(s) Oral every 8 hours  zinc sulfate 220 milliGRAM(s) Oral daily    PRN Inpatient Medications  acetaminophen     Tablet .. 650 milliGRAM(s) Oral every 6 hours PRN  dextrose Oral Gel 15 Gram(s) Oral once PRN  sodium chloride 0.9% Bolus. 100 milliLiter(s) IV Bolus every 5 minutes PRN            VITALS/PHYSICAL EXAM  --------------------------------------------------------------------------------  T(C): 36.4 (06-03-23 @ 04:33), Max: 36.7 (06-02-23 @ 07:35)  HR: 82 (06-03-23 @ 04:36) (73 - 91)  BP: 136/76 (06-03-23 @ 04:33) (95/53 - 136/76)  RR: 20 (06-03-23 @ 04:33) (18 - 20)  SpO2: 100% (06-03-23 @ 04:36) (98% - 100%)  Wt(kg): --  Height (cm): 170.2 (06-01-23 @ 09:07)      06-02-23 @ 07:01  -  06-03-23 @ 07:00  --------------------------------------------------------  IN: 870 mL / OUT: 1300 mL / NET: -430 mL      Physical Exam:  	Gen: trach/vent   	Pulm: B/L aline   	CV:  S1S2; no rub  	Abd: +distended  	Vascular access:    LABS/STUDIES  --------------------------------------------------------------------------------    129  |  91  |  130  ----------------------------<  148      [06-02-23 @ 19:45]  3.4   |  19  |  2.8        Ca     7.5     [06-02-23 @ 19:45]      Phos  2.1     [06-02-23 @ 19:45]    TPro  5.2  /  Alb  1.8  /  TBili  0.5  /  DBili  x   /  AST  15  /  ALT  <5  /  AlkPhos  151  [06-01-23 @ 22:50]    PT/INR: PT 16.10, INR 1.40       [06-01-23 @ 12:04]  PTT: 34.5       [06-01-23 @ 12:04]      Creatinine Trend:  SCr 2.8 [06-02 @ 19:45]  SCr 4.5 [06-01 @ 22:50]  SCr 4.6 [06-01 @ 07:17]  SCr 4.4 [05-31 @ 12:09]  SCr 4.1 [05-31 @ 05:50]    Urinalysis - [05-30-23 @ 21:15]      Color Jazmine / Appearance Turbid / SG 1.023 / pH 5.5      Gluc Negative / Ketone Trace  / Bili Negative / Urobili 6 mg/dL       Blood Moderate / Protein 100 mg/dL / Leuk Est Large / Nitrite Negative       /  / Hyaline 15 / Gran  / Sq Epi  / Non Sq Epi  / Bacteria Negative    Urine Creatinine 10      [05-31-23 @ 05:50]  Urine Sodium 96.0      [05-31-23 @ 05:50]  Urine Osmolality 347      [05-31-23 @ 05:50]    Vitamin D (25OH) 46      [10-07-22 @ 09:40]    HBsAb <3.0      [06-01-23 @ 22:50]  HBsAb Nonreact      [06-01-23 @ 22:50]  HBsAg Nonreact      [06-01-23 @ 22:50]  HBcAb Nonreact      [06-01-23 @ 22:50]

## 2023-06-03 NOTE — PROGRESS NOTE ADULT - ASSESSMENT
64 yo M with  PMH of ICH (2021) s/p trach and PEG, HTN, HLD, T2DM, CAD s/p stents, Recurrent C diff, BIBEMS from [facility] for abnormal labs. Patient non-verbal at baseline - limited history. Per NH chart  have a BUN greater than 200 and creatinine of 4.3 on outpatient labs. Outpatient CXR also w/ new L sided pleural effusion. With EMS patient was also found to be in irregular rhythm, no known history of A-fib or a flutter.  MICHELLE on CKD 3a - severe azotemia  likely due to GIB abd low BP, worsening  Hyponatremia / hypokalemia  Acute anemia - UGIB  Troponemia  Lactic acidosis  DKA   Afib   - teixeira, strict i/o   - continue bumex   - no HD today   - BP noted / continue midodrine   - ph noted / no binders   will follow

## 2023-06-04 NOTE — PROGRESS NOTE ADULT - SUBJECTIVE AND OBJECTIVE BOX
ALICIA BARBOUR  63y  Male      Patient is a 63y old  Male who presents with a chief complaint of Abnormal labs (04 Jun 2023 10:19)      INTERVAL HPI/OVERNIGHT EVENTS:  No overnight events       T(C): 36.3 (06-04-23 @ 07:24), Max: 36.8 (06-03-23 @ 16:35)  HR: 70 (06-04-23 @ 08:11) (67 - 76)  BP: 101/64 (06-04-23 @ 07:24) (93/57 - 107/63)  RR: 20 (06-04-23 @ 07:24) (20 - 20)  SpO2: 98% (06-04-23 @ 08:11) (97% - 100%)  Wt(kg): --Vital Signs Last 24 Hrs  T(C): 36.3 (04 Jun 2023 07:24), Max: 36.8 (03 Jun 2023 16:35)  T(F): 97.3 (04 Jun 2023 07:24), Max: 98.2 (03 Jun 2023 16:35)  HR: 70 (04 Jun 2023 08:11) (67 - 76)  BP: 101/64 (04 Jun 2023 07:24) (93/57 - 107/63)  BP(mean): 77 (04 Jun 2023 07:24) (77 - 78)  RR: 20 (04 Jun 2023 07:24) (20 - 20)  SpO2: 98% (04 Jun 2023 08:11) (97% - 100%)    Parameters below as of 04 Jun 2023 07:24  Patient On (Oxygen Delivery Method): ventilator    PHYSICAL EXAM:  GENERAL: NAD, well-groomed, well-developed  NECK: Supple, No JVD, Normal thyroid  NERVOUS SYSTEM: non verbal; at baseline   CHEST/LUNG: Clear to percussion bilaterally; No rales, rhonchi, wheezing, or rubs  HEART: Regular rate and rhythm; No murmurs, rubs, or gallops  ABDOMEN: Soft, Nontender, Nondistended; Bowel sounds present  EXTREMITIES:  2+ Peripheral Pulses, No clubbing, cyanosis, or edema    Labs reviewed   Imaging Reviewed     acetaminophen     Tablet .. 650 milliGRAM(s) Oral every 6 hours PRN  albuterol/ipratropium for Nebulization 3 milliLiter(s) Nebulizer every 6 hours  aMIOdarone    Tablet 400 milliGRAM(s) Oral every 8 hours  aMIOdarone    Tablet 200 milliGRAM(s) Oral daily  aMIOdarone    Tablet   Oral   ascorbic acid 500 milliGRAM(s) Oral daily  aspirin  chewable 81 milliGRAM(s) Oral daily  atorvastatin 80 milliGRAM(s) Oral at bedtime  buMETAnide Injectable 2 milliGRAM(s) IV Push every 12 hours  chlorhexidine 0.12% Liquid 15 milliLiter(s) Oral Mucosa two times a day  chlorhexidine 2% Cloths 1 Application(s) Topical <User Schedule>  dextrose 5%. 1000 milliLiter(s) IV Continuous <Continuous>  dextrose 5%. 1000 milliLiter(s) IV Continuous <Continuous>  dextrose 50% Injectable 25 Gram(s) IV Push once  dextrose 50% Injectable 25 Gram(s) IV Push once  dextrose 50% Injectable 12.5 Gram(s) IV Push once  dextrose Oral Gel 15 Gram(s) Oral once PRN  ferrous    sulfate Liquid 300 milliGRAM(s) Enteral Tube daily  fluconAZOLE IVPB 200 milliGRAM(s) IV Intermittent every 24 hours  folic acid 1 milliGRAM(s) Oral daily  glucagon  Injectable 1 milliGRAM(s) IntraMuscular once  heparin   Injectable 5000 Unit(s) SubCutaneous every 12 hours  insulin glargine Injectable (LANTUS) 10 Unit(s) SubCutaneous at bedtime  insulin lispro (ADMELOG) corrective regimen sliding scale   SubCutaneous three times a day before meals  levETIRAcetam  Solution 500 milliGRAM(s) Oral two times a day  midodrine. 10 milliGRAM(s) Oral three times a day  multivitamin 1 Tablet(s) Oral daily  pantoprazole    Tablet 40 milliGRAM(s) Oral before breakfast  silver sulfADIAZINE 1% Cream 1 Application(s) Topical every 12 hours  sodium bicarbonate 1300 milliGRAM(s) Oral every 8 hours  sodium chloride 0.9% Bolus. 100 milliLiter(s) IV Bolus every 5 minutes PRN  zinc sulfate 220 milliGRAM(s) Oral daily      ASSESSMENT AND PLAN:    #MICHELLE (pre-renal Vs ATN) on CKD   #Fluid overload  #HAGMA 2/2 renal failure  #?Sepsis POA 2/2 UTI  - Cr 4.6 (baseline 1.1 in 10/22)  - non oliguric (has good urine output)   - RBUS: unremarkable  - c/w bumex 2mg iv q12hr  - c/w midodrine 10mg q8hr per nephro as BP is soft   - AG 26; bicarb 12; lactate trended down to normal  - c/w sodium bicarb 1300mg PO TID  - Follow up with nephro   - On abx: cefepime and vanc (renally dosed) for empiric coverage: dc when no longed needed  - UA equivocal for UTI (high WBC and no bacteria)  - BCx negative  - F/u UCx    #Acute anemia  - HgB 6.7 (baseline 8) s/p 2U pRBC - improved to 9.9  - evaluated by GI - no evidence of GI bleed (PEG tube flushed with returin of bile and no blood)  - Monitor H/H closely  - Active T+S  - Goal HgB > 7    #Hyponatremia   - likely hypervolemic from acute kidney dysfunction Vs ?new CHF  - c/w bumex 2mg iv q12hr -per nephro    #Afib w/ RVR  - currently rate controlled s/p amio gtt  - c/w PO amio 400mg q8hr    #CAD s/p stents  #NSTEMI  - Trops 1.80-->1.63-->1.57-->1.55  - seen by cardio  - c/w asa and statin  - elevated trops likely from demand ischemia    #DKA  - BHB 2.2; AG 26 - f/u reorders   - started on insulin gtt when FS > 300 but patient's glucose started to drop to <100 so Insulin gtt was stopped and started tube feeds  - At this time elevated anion gap is more from underlying acute kidney dysfunction   - c/w insulin sliding scale  - Closely monitor finger sticks    #Hx ICH s/p trach and PEG  - c/w keppra 500mg BID for seizure prophylaxis    Diet: NPO with PEG feeds  DVT px - heparin  GI px - protonix.  DNR, on trach

## 2023-06-04 NOTE — PROGRESS NOTE ADULT - SUBJECTIVE AND OBJECTIVE BOX
Nephrology Progress Note    ALICIA BARBOUR  MRN-[id number]  63y  Male    S:  Patient is seen and examined, events over the last 24h noted.    O:  Allergies:  penicillin (Other)    Hospital Medications:   MEDICATIONS  (STANDING):  albuterol/ipratropium for Nebulization 3 milliLiter(s) Nebulizer every 6 hours  aMIOdarone    Tablet 200 milliGRAM(s) Oral daily  aMIOdarone    Tablet 400 milliGRAM(s) Oral every 8 hours  ascorbic acid 500 milliGRAM(s) Oral daily  aspirin  chewable 81 milliGRAM(s) Oral daily  atorvastatin 80 milliGRAM(s) Oral at bedtime  buMETAnide Injectable 2 milliGRAM(s) IV Push every 12 hours  ferrous    sulfate Liquid 300 milliGRAM(s) Enteral Tube daily  fluconAZOLE IVPB 200 milliGRAM(s) IV Intermittent every 24 hours  folic acid 1 milliGRAM(s) Oral daily  heparin   Injectable 5000 Unit(s) SubCutaneous every 12 hours  insulin lispro (ADMELOG) corrective regimen sliding scale   SubCutaneous three times a day before meals  levETIRAcetam  Solution 500 milliGRAM(s) Oral two times a day  midodrine. 10 milliGRAM(s) Oral three times a day  multivitamin 1 Tablet(s) Oral daily  pantoprazole    Tablet 40 milliGRAM(s) Oral before breakfast  silver sulfADIAZINE 1% Cream 1 Application(s) Topical every 12 hours  sodium bicarbonate 1300 milliGRAM(s) Oral every 8 hours  zinc sulfate 220 milliGRAM(s) Oral daily    MEDICATIONS  (PRN):  acetaminophen     Tablet .. 650 milliGRAM(s) Oral every 6 hours PRN Temp greater or equal to 38C (100.4F), Mild Pain (1 - 3)  dextrose Oral Gel 15 Gram(s) Oral once PRN Blood Glucose LESS THAN 70 milliGRAM(s)/deciliter  sodium chloride 0.9% Bolus. 100 milliLiter(s) IV Bolus every 5 minutes PRN SBP LESS THAN or EQUAL to 80 mmHg    Home Medications:  albuterol 2.5 mg/3 mL (0.083%) inhalation solution: 1 unit(s) inhaled every 4 hours, As Needed (04 Oct 2022 11:00)  ascorbic acid 500 mg oral tablet: 1 tab(s) orally once a day (31 May 2023 01:05)  aspirin 81 mg oral tablet, chewable: 1 tab(s) orally once a day (04 Oct 2022 11:00)  atorvastatin 80 mg oral tablet: 1 tab(s) orally once a day (04 Oct 2022 11:00)  Atrovent 18 mcg/inh inhalation aerosol: 1 unit(s) inhaled every 6 hours (04 Oct 2022 11:00)  Bactrim  mg-160 mg oral tablet: 1 tablet with sensor orally every 12 hours (31 May 2023 01:17)  chlorhexidine 0.12% mucous membrane liquid: 15 milliliter(s) buccal 2 times a day (31 May 2023 00:59)  doxazosin 4 mg oral tablet: 1 tab(s) orally once a day (at bedtime) (31 May 2023 01:00)  epoetin [place] 40,000 units/mL injectable solution: 1 milliliter(s) injectable once a week (31 May 2023 01:04)  ergocalciferol 200 mcg/mL (8000 intl units/mL) oral solution: 0.25 milliliter(s) orally once a day (04 Oct 2022 11:00)  famotidine 10 mg oral tablet: 1 tab(s) orally 2 times a day (04 Oct 2022 11:00)  ferrous sulfate 220 mg/5 mL (44 mg/5 mL elemental iron) oral elixir: 7.5 milliliter(s) by gastrostomy tube once a day (04 Oct 2022 11:00)  folic acid 1 mg oral tablet: 1 tab(s) orally once a day (04 Oct 2022 11:00)  furosemide 40 mg oral tablet: 1 tab(s) orally once a day (31 May 2023 01:16)  imipenem-cilastatin 500 mg injection: 500 milligram(s) injectable every 6 hours (31 May 2023 01:17)  insulin lispro 100 units/mL injectable solution: injectable Sliding scale (31 May 2023 01:17)  Keppra 100 mg/mL oral solution: 5 milliliter(s) by gastrostomy tube 2 times a day (04 Oct 2022 11:00)  midodrine 5 mg oral tablet: 1 tab(s) orally 3 times a day (04 Oct 2022 11:00)  Multiple Vitamins oral tablet: 1 tab(s) orally once a day via PEG (04 Oct 2022 11:00)  SSD 1% topical cream: Apply topically to affected area every 12 hours (31 May 2023 01:17)  tamsulosin 0.4 mg oral capsule: 1 cap(s) orally once a day (at bedtime) (13 Oct 2022 10:24)  Tylenol 325 mg oral tablet: 2 tab(s) orally once a day (04 Oct 2022 11:00)  zinc sulfate 220 mg oral tablet: 1 tab(s) orally once a day (31 May 2023 01:16)      VITALS:  Daily     Daily   T(F): 97.3 (23 @ 07:24), Max: 98.2 (23 @ 16:35)  HR: 70 (23 @ 08:11)  BP: 101/64 (23 @ 07:24)  RR: 20 (23 @ 07:24)  SpO2: 98% (23 @ 08:11)  Wt(kg): --  I&O's Detail    2023 07:01  -  2023 07:00  --------------------------------------------------------  IN:    Enteral Tube Flush: 120 mL    IV PiggyBack: 100 mL    Peptamen A.F.: 700 mL  Total IN: 920 mL    OUT:    Indwelling Catheter - Urethral (mL): 325 mL  Total OUT: 325 mL    Total NET: 595 mL        I&O's Summary    2023 07:01  -  2023 07:00  --------------------------------------------------------  IN: 920 mL / OUT: 325 mL / NET: 595 mL          PHYSICAL EXAM:  Gen: trach/vent  Resp: b/l breath sounds  Card: S1/S2  Abd: soft ,peg      LABS:        127<L>  |  92<L>  |  138<HH>  ----------------------------<  243<H>  3.6   |  20  |  3.1<H>    Ca    8.0<L>      2023 06:57  Phos  2.2       Mg     2.3         TPro  5.0<L>  /  Alb  1.7<L>  /  TBili  0.4  /  DBili      /  AST  14  /  ALT  <5  /  AlkPhos  153<H>      Phosphorus Level, Serum: 2.2 mg/dL (23 @ 06:57)  Phosphorus Level, Serum: 2.2 mg/dL (23 @ 06:50)                        8.3    11.62 )-----------( 312      ( 2023 06:57 )             24.9     Mean Cell Volume: 88.3 fL (23 @ 06:57)    Iron Total, Serum: 81 ug/dL (23 @ 06:50)  % Saturation, Iron: 80 % (23 @ 06:50)      Urine Studies:  Urinalysis Basic - ( 30 May 2023 21:15 )    Color: Jazmine / Appearance: Turbid / S.023 / pH:   Gluc:  / Ketone: Trace  / Bili: Negative / Urobili: 6 mg/dL   Blood:  / Protein: 100 mg/dL / Nitrite: Negative   Leuk Esterase: Large / RBC: 355 /HPF /  /HPF   Sq Epi:  / Non Sq Epi:  / Bacteria: Negative        Urea Nitrogen,  Random Urine: 471 mg/dL (11-15-21 @ 14:46)    Sodium, Random Urine: 96.0 mmoL/L ( @ 05:50)  Osmolality, Random Urine: 347 mos/kg ( @ 05:50)    Creatinine trend:  Creatinine, Serum: 3.1 mg/dL (23 @ 06:57)  Creatinine, Serum: 3.0 mg/dL (23 @ 06:50)  Creatinine, Serum: 2.8 mg/dL (23 @ 19:45)  Creatinine, Serum: 4.5 mg/dL (23 @ 22:50)  Creatinine, Serum: 4.6 mg/dL (23 @ 07:17)  Creatinine, Serum: 4.4 mg/dL (23 @ 12:09)

## 2023-06-04 NOTE — PROGRESS NOTE ADULT - ASSESSMENT
62 yo M with  PMH of ICH (2021) s/p trach and PEG, HTN, HLD, T2DM, CAD s/p stents, Recurrent C diff, BIBEMS from [facility] for abnormal labs. Patient non-verbal at baseline - limited history. Per NH chart  have a BUN greater than 200 and creatinine of 4.3 on outpatient labs. Outpatient CXR also w/ new L sided pleural effusion. In ED, patient was hypotensive with Afib with RVR, found to be in acure renal failure, anemic with elevated trops. He was started on Amiodarone GTT, PPI GTT, gastric lavage with coffee ground secretions, started on broad spectrum abx and admitted to MICU  While in MICU, found to be in DKA, s/p insulin drip, MICHELLE started on HD, acute anemia s/p PRBC transfusion, NSTEMI with downtrending trops, now downgraded to SDU    Acute Renal Failure, started on HD 6/1  Fluid Overload  HAGMA  - non oliguric, RBUS without hydro   - started on HD 6/1 via R [facility] [place]   - c/w bumex 2mg iv q12hr, HD per renal--> no HD today   - c/w midodrine 10mg q8hr  - c/w sodium bicarb 1300mg PO TID  -  TTE 6/1 - EF 63%, GIDD   - nephrology following     Candida tropicalis UTI  s/p cefepime and vanc   Blood clts neg, URine with Candida tropicalis   on  fluconazole, ID following can change from IV to PO via gtube     Acute anemia  - HgB 6.7 (baseline 8) s/p 2U pRBC - improved to 9.9  - evaluated by GI - no evidence of GI bleed (PEG tube flushed with return of bile and no blood)  - Monitor H/H closely  - Active T+S    Hyponatremia, likely hypervolemic  - c/w bumex 2mg iv q12hr and HD per nephro  - daily BMP    Afib w/ RVR - now rate controlled   - currently rate controlled s/p amio gtt  - change amio to 200 q24H     CAD s/p stents  NSTEMI  - Trops 1.80-->1.63-->1.57-->1.55  - seen by cardio,  c/w asa and statin, medical management     DKA, resolved  - s/p insulin drip, now on basal/bolus and tube feeds. MOnitor FS     Hx ICH s/p trach and PEG  Bedbound from NH   - c/w keppra 500mg BID for seizure prophylaxis    DNR only, overall prognosis is poor  Pending: HD per renal, diuresis, labs, iv abx

## 2023-06-04 NOTE — PROGRESS NOTE ADULT - ASSESSMENT
62 yo M with  PMH of ICH (2021) s/p trach and PEG, HTN, HLD, T2DM, CAD s/p stents, Recurrent C diff, BIBEMS from [facility] for abnormal labs. Patient non-verbal at baseline - limited history. Per NH chart  have a BUN greater than 200 and creatinine of 4.3 on outpatient labs. Outpatient CXR also w/ new L sided pleural effusion. With EMS patient was also found to be in irregular rhythm, no known history of A-fib or a flutter.    MICHELLE on CKD 3a - severe azotemia  likely due to GIB / hypotension  Started HD on 6/1/23  Hyponatremia / hypokalemia  Acute anemia d/t UGIB  Troponemia  Lactic acidosis  DKA   Afib     - no HD today  - cont strict i/o   - continue bumex   - limit free water / dilute drips  - BP noted / continue midodrine   - ph noted / no binders   - very poor prognosis / GOC  will follow

## 2023-06-04 NOTE — PROGRESS NOTE ADULT - SUBJECTIVE AND OBJECTIVE BOX
Over Night Events: events noted, vent dependant, ID reviewed, afebrile    PHYSICAL EXAM    ICU Vital Signs Last 24 Hrs  T(C): 36.3 (04 Jun 2023 07:24), Max: 36.8 (03 Jun 2023 16:35)  T(F): 97.3 (04 Jun 2023 07:24), Max: 98.2 (03 Jun 2023 16:35)  HR: 70 (04 Jun 2023 08:11) (67 - 76)  BP: 101/64 (04 Jun 2023 07:24) (93/57 - 107/63)  BP(mean): 77 (04 Jun 2023 07:24) (77 - 78)-  RR: 20 (04 Jun 2023 07:24) (20 - 20)  SpO2: 98% (04 Jun 2023 08:11) (97% - 100%)    O2 Parameters below as of 04 Jun 2023 07:24  Patient On (Oxygen Delivery Method): ventilator            General: ill looking  HEENT: trach  Lungs: Bilateral BS  Cardiovascular: Regular   Abdomen: Soft, Positive BS  Extremities: No clubbing   not following commands      06-03-23 @ 07:01  -  06-04-23 @ 07:00  --------------------------------------------------------  IN:    Enteral Tube Flush: 120 mL    IV PiggyBack: 100 mL    Peptamen A.F.: 700 mL  Total IN: 920 mL    OUT:    Indwelling Catheter - Urethral (mL): 325 mL  Total OUT: 325 mL    Total NET: 595 mL          LABS:                          8.3    11.62 )-----------( 312      ( 04 Jun 2023 06:57 )             24.9                                               06-04    127<L>  |  92<L>  |  138<HH>  ----------------------------<  243<H>  3.6   |  20  |  3.1<H>    Ca    8.0<L>      04 Jun 2023 06:57  Phos  2.2     06-04  Mg     2.3     06-04    TPro  5.0<L>  /  Alb  1.7<L>  /  TBili  0.4  /  DBili  x   /  AST  14  /  ALT  <5  /  AlkPhos  153<H>  06-04                                                                                           LIVER FUNCTIONS - ( 04 Jun 2023 06:57 )  Alb: 1.7 g/dL / Pro: 5.0 g/dL / ALK PHOS: 153 U/L / ALT: <5 U/L / AST: 14 U/L / GGT: x                                                                                               Mode: AC/ CMV (Assist Control/ Continuous Mandatory Ventilation)  RR (machine): 14  TV (machine): 450  FiO2: 40  PEEP: 8  ITime: 1  MAP: 12  PIP: 22                                          MEDICATIONS  (STANDING):  albuterol/ipratropium for Nebulization 3 milliLiter(s) Nebulizer every 6 hours  aMIOdarone    Tablet 200 milliGRAM(s) Oral daily  aMIOdarone    Tablet   Oral   aMIOdarone    Tablet 400 milliGRAM(s) Oral every 8 hours  ascorbic acid 500 milliGRAM(s) Oral daily  aspirin  chewable 81 milliGRAM(s) Oral daily  atorvastatin 80 milliGRAM(s) Oral at bedtime  buMETAnide Injectable 2 milliGRAM(s) IV Push every 12 hours  chlorhexidine 0.12% Liquid 15 milliLiter(s) Oral Mucosa two times a day  chlorhexidine 2% Cloths 1 Application(s) Topical <User Schedule>  dextrose 5%. 1000 milliLiter(s) (50 mL/Hr) IV Continuous <Continuous>  dextrose 5%. 1000 milliLiter(s) (100 mL/Hr) IV Continuous <Continuous>  dextrose 50% Injectable 25 Gram(s) IV Push once  dextrose 50% Injectable 25 Gram(s) IV Push once  dextrose 50% Injectable 12.5 Gram(s) IV Push once  ferrous    sulfate Liquid 300 milliGRAM(s) Enteral Tube daily  fluconAZOLE IVPB 200 milliGRAM(s) IV Intermittent every 24 hours  folic acid 1 milliGRAM(s) Oral daily  glucagon  Injectable 1 milliGRAM(s) IntraMuscular once  heparin   Injectable 5000 Unit(s) SubCutaneous every 12 hours  insulin lispro (ADMELOG) corrective regimen sliding scale   SubCutaneous three times a day before meals  levETIRAcetam  Solution 500 milliGRAM(s) Oral two times a day  midodrine. 10 milliGRAM(s) Oral three times a day  multivitamin 1 Tablet(s) Oral daily  pantoprazole    Tablet 40 milliGRAM(s) Oral before breakfast  silver sulfADIAZINE 1% Cream 1 Application(s) Topical every 12 hours  sodium bicarbonate 1300 milliGRAM(s) Oral every 8 hours  zinc sulfate 220 milliGRAM(s) Oral daily    MEDICATIONS  (PRN):  acetaminophen     Tablet .. 650 milliGRAM(s) Oral every 6 hours PRN Temp greater or equal to 38C (100.4F), Mild Pain (1 - 3)  dextrose Oral Gel 15 Gram(s) Oral once PRN Blood Glucose LESS THAN 70 milliGRAM(s)/deciliter  sodium chloride 0.9% Bolus. 100 milliLiter(s) IV Bolus every 5 minutes PRN SBP LESS THAN or EQUAL to 80 mmHg

## 2023-06-04 NOTE — PROGRESS NOTE ADULT - SUBJECTIVE AND OBJECTIVE BOX
ALICIA BARBOUR  63y Male    CHIEF COMPLAINT:    Patient is a 63y old  Male who presents with a chief complaint of Abnormal labs (04 Jun 2023 09:13)    INTERVAL HPI/OVERNIGHT EVENTS:    Patient seen and examined. No acute events overnight. Overall unchanged, remains vented     ROS: All other systems are negative.    Vital Signs:    T(F): 97.3 (06-04-23 @ 07:24), Max: 98.2 (06-03-23 @ 16:35)  HR: 70 (06-04-23 @ 08:11) (67 - 76)  BP: 101/64 (06-04-23 @ 07:24) (93/57 - 107/63)  RR: 20 (06-04-23 @ 07:24) (20 - 20)  SpO2: 98% (06-04-23 @ 08:11) (97% - 100%)    03 Jun 2023 07:01  -  04 Jun 2023 07:00  --------------------------------------------------------  IN: 920 mL / OUT: 325 mL / NET: 595 mL    POCT Blood Glucose.: 218 mg/dL (04 Jun 2023 05:25)  POCT Blood Glucose.: 252 mg/dL (03 Jun 2023 16:31)  POCT Blood Glucose.: 265 mg/dL (03 Jun 2023 11:19)    PHYSICAL EXAM:    GENERAL:  NAD chronically ill appearing   SKIN: No rashes or lesions  HEENT: Atraumatic. Normocephalic.   NECK: Supple, No JVD.    PULMONARY: decreased breath sounds B/L. No wheezing.   CVS: Normal S1, S2. Rate and Rhythm are regular.   ABDOMEN/GI: Soft, Nontender, Nondistended  MSK:  No clubbing or cyanosis contracted   NEUROLOGIC:  does not follow commands   PSYCH: obtunded    Consultant(s) Notes Reviewed:  [x ] YES  [ ] NO  Care Discussed with Consultants/Other Providers [ x] YES  [ ] NO    LABS:                        8.3    11.62 )-----------( 312      ( 04 Jun 2023 06:57 )             24.9     127<L>  |  92<L>  |  138<HH>  ----------------------------<  243<H>  3.6   |  20  |  3.1<H>    Ca    8.0<L>      04 Jun 2023 06:57  Phos  2.2     06-04  Mg     2.3     06-04    TPro  5.0<L>  /  Alb  1.7<L>  /  TBili  0.4  /  DBili  x   /  AST  14  /  ALT  <5  /  AlkPhos  153<H>  06-04    RADIOLOGY & ADDITIONAL TESTS:  Imaging or report Personally Reviewed:  [x] YES  [ ] NO  EKG reviewed: [x] YES  [ ] NO    Medications:  Standing  albuterol/ipratropium for Nebulization 3 milliLiter(s) Nebulizer every 6 hours  aMIOdarone    Tablet 400 milliGRAM(s) Oral every 8 hours  aMIOdarone    Tablet 200 milliGRAM(s) Oral daily  aMIOdarone    Tablet   Oral   ascorbic acid 500 milliGRAM(s) Oral daily  aspirin  chewable 81 milliGRAM(s) Oral daily  atorvastatin 80 milliGRAM(s) Oral at bedtime  buMETAnide Injectable 2 milliGRAM(s) IV Push every 12 hours  chlorhexidine 0.12% Liquid 15 milliLiter(s) Oral Mucosa two times a day  chlorhexidine 2% Cloths 1 Application(s) Topical <User Schedule>  dextrose 5%. 1000 milliLiter(s) IV Continuous <Continuous>  dextrose 5%. 1000 milliLiter(s) IV Continuous <Continuous>  dextrose 50% Injectable 25 Gram(s) IV Push once  dextrose 50% Injectable 25 Gram(s) IV Push once  dextrose 50% Injectable 12.5 Gram(s) IV Push once  ferrous    sulfate Liquid 300 milliGRAM(s) Enteral Tube daily  fluconAZOLE IVPB 200 milliGRAM(s) IV Intermittent every 24 hours  folic acid 1 milliGRAM(s) Oral daily  glucagon  Injectable 1 milliGRAM(s) IntraMuscular once  heparin   Injectable 5000 Unit(s) SubCutaneous every 12 hours  insulin lispro (ADMELOG) corrective regimen sliding scale   SubCutaneous three times a day before meals  levETIRAcetam  Solution 500 milliGRAM(s) Oral two times a day  midodrine. 10 milliGRAM(s) Oral three times a day  multivitamin 1 Tablet(s) Oral daily  pantoprazole    Tablet 40 milliGRAM(s) Oral before breakfast  silver sulfADIAZINE 1% Cream 1 Application(s) Topical every 12 hours  sodium bicarbonate 1300 milliGRAM(s) Oral every 8 hours  zinc sulfate 220 milliGRAM(s) Oral daily    PRN Meds  acetaminophen     Tablet .. 650 milliGRAM(s) Oral every 6 hours PRN  dextrose Oral Gel 15 Gram(s) Oral once PRN  sodium chloride 0.9% Bolus. 100 milliLiter(s) IV Bolus every 5 minutes PRN

## 2023-06-04 NOTE — PROGRESS NOTE ADULT - ASSESSMENT
IMPRESSION:    Sepsis on admission  Candida UTI  MICHELLE on CKD III on dialysis   NSTEMI likely type 2  Hyponatremia, improving   Paroxysmal Afib  Acute on chronic anemia suspected UGI Bleed  Chronic respiratory failure  H/o ICH s/p trach and PEG  H/o CAD      PLAN:    CNS: continue home meds     HEENT: Oral and trach care    PULMONARY: HOB 45. Aspiration, no vent changes, ABG PRN. Goal saturation 92-95%. HOB @ 45 degrees. monitor P/P/ DP, not weanable    CARDIOVASCULAR: Avoid overload. 2D-Echo noted EF 63%.    GI: PEG feeds. bowel regimen     RENAL: trend CMP, Nephrology following     INFECTIOUS DISEASE: Follow up blood and urine cultures, Cefepime and Vancomycin, procalcitonin 2.38     HEMATOLOGICAL: Trend CMP, f/u LE doppler read     ENDOCRINE: Follow up FS. Insulin protocol if needed.    MUSCULOSKELETAL: Bedrest.    DNR  transfer Vent Unit

## 2023-06-05 NOTE — PROGRESS NOTE ADULT - ASSESSMENT
62 yo M with  PMH of ICH (2021) s/p trach and PEG, HTN, HLD, T2DM, CAD s/p stents, Recurrent C diff, BIBEMS from [facility] for abnormal labs. Patient non-verbal at baseline - limited history. Per NH chart  have a BUN greater than 200 and creatinine of 4.3 on outpatient labs. Outpatient CXR also w/ new L sided pleural effusion. In ED, patient was hypotensive with Afib with RVR, found to be in acure renal failure, anemic with elevated trops. He was started on Amiodarone GTT, PPI GTT, gastric lavage with coffee ground secretions, started on broad spectrum abx and admitted to MICU  While in MICU, found to be in DKA, s/p insulin drip, MICHELLE started on HD, acute anemia s/p PRBC transfusion, NSTEMI with downtrending trops, now downgraded to SDU    Acute Renal Failure, started on HD 6/1  Fluid Overload  HAGMA  - non oliguric, RBUS without hydro   - started on HD 6/1 via R IJ [place]   - c/w bumex 2mg iv q12hr, HD per renal--> HD today   - c/w midodrine 10mg q8hr  - c/w sodium bicarb 1300mg PO TID  -  TTE 6/1 - EF 63%, GIDD   - nephrology following     Candida tropicalis UTI  s/p cefepime and vanc   Blood clts neg, URine with Candida tropicalis   on  fluconazole, ID following can change from IV to PO via gtube     Acute anemia  - HgB 6.7 (baseline 8) s/p 2U pRBC - improved to 9.9  - evaluated by GI - no evidence of GI bleed (PEG tube flushed with return of bile and no blood)  - Monitor H/H closely  - Active T+S    Hyponatremia, likely hypervolemic  - c/w bumex 2mg iv q12hr and HD per nephro  - daily BMP    Afib w/ RVR - now rate controlled   - currently rate controlled s/p amio gtt  - change amio to 200 q24H     CAD s/p stents  NSTEMI  - Trops 1.80-->1.63-->1.57-->1.55  - seen by cardio,  c/w asa and statin, medical management     DKA, resolved  - s/p insulin drip, now on basal/bolus and tube feeds. Monitor FS     Hx ICH s/p trach and PEG  Bedbound from NH   - c/w keppra 500mg BID for seizure prophylaxis    DNR only, overall prognosis is poor  Pending: HD per renal, diuresis, labs, iv abx

## 2023-06-05 NOTE — PROGRESS NOTE ADULT - ASSESSMENT
IMPRESSION:    Sepsis on admission  Candida UTI  MICHELLE on CKD III on dialysis   NSTEMI likely type 2  Hyponatremia, improving   Paroxysmal Afib  Acute on chronic anemia suspected UGI Bleed  Chronic respiratory failure  H/o ICH s/p trach and PEG  H/o CAD      PLAN:    CNS: continue home meds     HEENT: Oral and trach care    PULMONARY: HOB 45. Aspiration, no vent changes, ABG PRN. Goal saturation 92-95%. HOB @ 45 degrees. monitor P/P/ DP, not weanable    CARDIOVASCULAR: Avoid overload    GI: PEG feeds. bowel regimen     RENAL: trend CMP, Nephrology following     INFECTIOUS DISEASE: ABX PER ID    HEMATOLOGICAL: Trend CMP, f/u LE doppler read     ENDOCRINE: Follow up FS. Insulin protocol if needed.    MUSCULOSKELETAL: Bedrest.    DNR  transfer Vent Unit

## 2023-06-05 NOTE — PROGRESS NOTE ADULT - SUBJECTIVE AND OBJECTIVE BOX
Nephrology progress note    THIS IS AN INCOMPLETE NOTE . FULL NOTE TO FOLLOW SHORTLY    Patient is seen and examined, events over the last 24 h noted .    Allergies:  penicillin (Other)    Hospital Medications:   MEDICATIONS  (STANDING):  albuterol/ipratropium for Nebulization 3 milliLiter(s) Nebulizer every 6 hours  aMIOdarone    Tablet 200 milliGRAM(s) Oral daily  aMIOdarone    Tablet   Oral   ascorbic acid 500 milliGRAM(s) Oral daily  aspirin  chewable 81 milliGRAM(s) Oral daily  atorvastatin 80 milliGRAM(s) Oral at bedtime  buMETAnide Injectable 2 milliGRAM(s) IV Push every 12 hours  chlorhexidine 0.12% Liquid 15 milliLiter(s) Oral Mucosa two times a day  chlorhexidine 2% Cloths 1 Application(s) Topical <User Schedule>  dextrose 5%. 1000 milliLiter(s) (100 mL/Hr) IV Continuous <Continuous>  dextrose 5%. 1000 milliLiter(s) (50 mL/Hr) IV Continuous <Continuous>  dextrose 50% Injectable 25 Gram(s) IV Push once  dextrose 50% Injectable 25 Gram(s) IV Push once  dextrose 50% Injectable 12.5 Gram(s) IV Push once  ferrous    sulfate Liquid 300 milliGRAM(s) Enteral Tube daily  fluconAZOLE IVPB 200 milliGRAM(s) IV Intermittent every 24 hours  folic acid 1 milliGRAM(s) Oral daily  glucagon  Injectable 1 milliGRAM(s) IntraMuscular once  heparin   Injectable 5000 Unit(s) SubCutaneous every 12 hours  insulin glargine Injectable (LANTUS) 10 Unit(s) SubCutaneous at bedtime  insulin lispro (ADMELOG) corrective regimen sliding scale   SubCutaneous three times a day before meals  levETIRAcetam  Solution 500 milliGRAM(s) Oral two times a day  midodrine. 10 milliGRAM(s) Oral three times a day  multivitamin 1 Tablet(s) Oral daily  pantoprazole    Tablet 40 milliGRAM(s) Oral before breakfast  silver sulfADIAZINE 1% Cream 1 Application(s) Topical every 12 hours  sodium bicarbonate 1300 milliGRAM(s) Oral every 8 hours  zinc sulfate 220 milliGRAM(s) Oral daily        VITALS:  T(F): 96.6 (23 @ 08:11), Max: 97.8 (23 @ 00:36)  HR: 72 (06-05-23 @ 08:11)  BP: 122/68 (23 @ 08:11)  RR: 20 (23 @ 00:36)  SpO2: 100% (23 @ 08:11)  Wt(kg): --     @ 07:01  -   @ 07:00  --------------------------------------------------------  IN: 920 mL / OUT: 325 mL / NET: 595 mL     @ 07:  -   @ 07:00  --------------------------------------------------------  IN: 0 mL / OUT: 325 mL / NET: -325 mL          PHYSICAL EXAM:  Constitutional: NAD  HEENT: anicteric sclera, oropharynx clear, MMM  Neck: No JVD  Respiratory: CTAB, no wheezes, rales or rhonchi  Cardiovascular: S1, S2, RRR  Gastrointestinal: BS+, soft, NT/ND  Extremities: No cyanosis or clubbing. No peripheral edema  :  No teixeira.   Skin: No rashes    LABS:      127<L>  |  90<L>  |  148<HH>  ----------------------------<  191<H>  4.0   |  21  |  3.3<H>    Ca    8.2<L>      2023 06:07  Phos  2.2     06-05  Mg     2.6     06-05    TPro  5.2<L>  /  Alb  1.9<L>  /  TBili  0.4  /  DBili      /  AST  16  /  ALT  <5  /  AlkPhos  169<H>                            8.7    13.61 )-----------( 327      ( 2023 06:07 )             27.0       Urine Studies:  Urinalysis Basic - ( 30 May 2023 21:15 )    Color: Jazmine / Appearance: Turbid / S.023 / pH:   Gluc:  / Ketone: Trace  / Bili: Negative / Urobili: 6 mg/dL   Blood:  / Protein: 100 mg/dL / Nitrite: Negative   Leuk Esterase: Large / RBC: 355 /HPF /  /HPF   Sq Epi:  / Non Sq Epi:  / Bacteria: Negative      Sodium, Random Urine: 96.0 mmoL/L ( @ 05:50)  Osmolality, Random Urine: 347 mos/kg ( @ 05:50)  Creatinine, Random Urine: 10 mg/dL ( @ 05:50)      Iron 81, TIBC 101, %sat 80      [23 @ 06:50]  Vitamin D (25OH) 70      [23 @ 06:50]    HBsAb <3.0      [23 @ 22:50]  HBsAb Nonreact      [23 @ 22:50]  HBsAg Nonreact      [23 @ 22:50]  HBcAb Nonreact      [23 @ 22:50]  HCV 0.09, Nonreact      [21 @ 09:04]        RADIOLOGY & ADDITIONAL STUDIES:   Nephrology progress note    Patient is seen and examined, events over the last 24 h noted .  Lying in bed     Allergies:  penicillin (Other)    Hospital Medications:   MEDICATIONS  (STANDING):  albuterol/ipratropium for Nebulization 3 milliLiter(s) Nebulizer every 6 hours  aMIOdarone    Tablet 200 milliGRAM(s) Oral daily  ascorbic acid 500 milliGRAM(s) Oral daily  aspirin  chewable 81 milliGRAM(s) Oral daily  atorvastatin 80 milliGRAM(s) Oral at bedtime  buMETAnide Injectable 2 milliGRAM(s) IV Push every 12 hours  ferrous    sulfate Liquid 300 milliGRAM(s) Enteral Tube daily  fluconAZOLE IVPB 200 milliGRAM(s) IV Intermittent every 24 hours  folic acid 1 milliGRAM(s) Oral daily  glucagon  Injectable 1 milliGRAM(s) IntraMuscular once  heparin   Injectable 5000 Unit(s) SubCutaneous every 12 hours  insulin glargine Injectable (LANTUS) 10 Unit(s) SubCutaneous at bedtime  insulin lispro (ADMELOG) corrective regimen sliding scale   SubCutaneous three times a day before meals  levETIRAcetam  Solution 500 milliGRAM(s) Oral two times a day  midodrine. 10 milliGRAM(s) Oral three times a day  multivitamin 1 Tablet(s) Oral daily  pantoprazole    Tablet 40 milliGRAM(s) Oral before breakfast  silver sulfADIAZINE 1% Cream 1 Application(s) Topical every 12 hours  sodium bicarbonate 1300 milliGRAM(s) Oral every 8 hours  zinc sulfate 220 milliGRAM(s) Oral daily        VITALS:  T(F): 96.6 (23 @ 08:11), Max: 97.8 (23 @ 00:36)  HR: 72 (23 @ 08:11)  BP: 122/68 (23 @ 08:11)  RR: 20 (23 @ 00:36)  SpO2: 100% (23 @ 08:11)      06-03 @ 07:01  -   @ 07:00  --------------------------------------------------------  IN: 920 mL / OUT: 325 mL / NET: 595 mL     @ 07:01  -   @ 07:00  --------------------------------------------------------  IN: 0 mL / OUT: 325 mL / NET: -325 mL          PHYSICAL EXAM:  Constitutional: NAD  Respiratory: CTAB,   Cardiovascular: S1, S2, RRR  Gastrointestinal: BS+, soft, NT/ND  Extremities: No cyanosis or clubbing. No peripheral edema  :  No teixeira.   Skin: No rashes    LABS:      127<L>  |  90<L>  |  148<HH>  ----------------------------<  191<H>  4.0   |  21  |  3.3<H>    Ca    8.2<L>      2023 06:07  Phos  2.2       Mg     2.6         TPro  5.2<L>  /  Alb  1.9<L>  /  TBili  0.4  /  DBili      /  AST  16  /  ALT  <5  /  AlkPhos  169<H>                            8.7    13.61 )-----------( 327      ( 2023 06:07 )             27.0       Urine Studies:  Urinalysis Basic - ( 30 May 2023 21:15 )    Color: Jazmine / Appearance: Turbid / S.023 / pH:   Gluc:  / Ketone: Trace  / Bili: Negative / Urobili: 6 mg/dL   Blood:  / Protein: 100 mg/dL / Nitrite: Negative   Leuk Esterase: Large / RBC: 355 /HPF /  /HPF   Sq Epi:  / Non Sq Epi:  / Bacteria: Negative      Sodium, Random Urine: 96.0 mmoL/L ( @ 05:50)  Osmolality, Random Urine: 347 mos/kg ( @ 05:50)  Creatinine, Random Urine: 10 mg/dL ( @ 05:50)      Iron 81, TIBC 101, %sat 80      [23 @ 06:50]  Vitamin D (25OH) 70      [23 @ 06:50]    HBsAb <3.0      [23 @ 22:50]  HBsAb Nonreact      [23 @ 22:50]  HBsAg Nonreact      [23 @ 22:50]  HBcAb Nonreact      [23 @ 22:50]  HCV 0.09, Nonreact      [21 @ 09:04]        RADIOLOGY & ADDITIONAL STUDIES:

## 2023-06-05 NOTE — PROCEDURE NOTE - NSPROCDETAILS_GEN_ALL_CORE
supine position/ultrasound guidance
guidewire recovered/lumen(s) aspirated and flushed/sterile dressing applied/sterile technique, catheter placed/ultrasound guidance with use of sterile gel and probe cove

## 2023-06-05 NOTE — PROGRESS NOTE ADULT - SUBJECTIVE AND OBJECTIVE BOX
ALICIA BARBOUR  63y Male    CHIEF COMPLAINT:    Patient is a 63y old  Male who presents with a chief complaint of Abnormal labs (2023 12:35)    INTERVAL HPI/OVERNIGHT EVENTS:    Patient seen and examined. No acute events overnight. Overall unchanged     ROS: All other systems are negative.    Vital Signs:    T(F): 96.8 (23 @ 11:40), Max: 97.8 (23 @ 00:36)  HR: 71 (23 @ 12:35) (68 - 74)  BP: 159/75 (23 @ 12:30) (96/64 - 159/75)  RR: 20 (23 @ 12:30) (20 - 20)  SpO2: 99% (23 @ 12:35) (98% - 100%)    2023 07:01  -  2023 07:00  --------------------------------------------------------  IN: 0 mL / OUT: 325 mL / NET: -325 mL    Daily Weight in k (2023 06:22)    POCT Blood Glucose.: 225 mg/dL (2023 11:23)  POCT Blood Glucose.: 234 mg/dL (2023 07:54)  POCT Blood Glucose.: 266 mg/dL (2023 21:57)  POCT Blood Glucose.: 217 mg/dL (2023 16:45)    PHYSICAL EXAM:    GENERAL:  NAD chronically ill appearing   SKIN: No rashes or lesions  HEENT: Atraumatic. Normocephalic.    NECK: Supple, No JVD.    PULMONARY: CTA B/L. No wheezing. No rales  CVS: Normal S1, S2. Rate and Rhythm are regular.   ABDOMEN/GI: Soft, Nontender, Nondistended   MSK:  No clubbing or cyanosis, contracted   NEUROLOGIC: does not follow commands, opens eyes spontaneously   PSYCH: AAOx0    Consultant(s) Notes Reviewed:  [x ] YES  [ ] NO  Care Discussed with Consultants/Other Providers [ x] YES  [ ] NO    LABS:                        8.7    13.61 )-----------( 327      ( 2023 06:07 )             27.0     127<L>  |  90<L>  |  148<HH>  ----------------------------<  191<H>  4.0   |  21  |  3.3<H>    Ca    8.2<L>      2023 06:07  Phos  2.2     06-05  Mg     2.6     06-05    TPro  5.2<L>  /  Alb  1.9<L>  /  TBili  0.4  /  DBili  x   /  AST  16  /  ALT  <5  /  AlkPhos  169<H>  06-05    RADIOLOGY & ADDITIONAL TESTS:  Imaging or report Personally Reviewed:  [x] YES  [ ] NO  EKG reviewed: [x] YES  [ ] NO    Medications:  Standing  albuterol/ipratropium for Nebulization 3 milliLiter(s) Nebulizer every 6 hours  aMIOdarone    Tablet 200 milliGRAM(s) Oral daily  aMIOdarone    Tablet   Oral   ascorbic acid 500 milliGRAM(s) Oral daily  aspirin  chewable 81 milliGRAM(s) Oral daily  atorvastatin 80 milliGRAM(s) Oral at bedtime  buMETAnide Injectable 2 milliGRAM(s) IV Push every 12 hours  chlorhexidine 0.12% Liquid 15 milliLiter(s) Oral Mucosa two times a day  chlorhexidine 2% Cloths 1 Application(s) Topical <User Schedule>  dextrose 5%. 1000 milliLiter(s) IV Continuous <Continuous>  dextrose 5%. 1000 milliLiter(s) IV Continuous <Continuous>  dextrose 50% Injectable 25 Gram(s) IV Push once  dextrose 50% Injectable 25 Gram(s) IV Push once  dextrose 50% Injectable 12.5 Gram(s) IV Push once  ferrous    sulfate Liquid 300 milliGRAM(s) Enteral Tube daily  fluconAZOLE IVPB 200 milliGRAM(s) IV Intermittent every 24 hours  folic acid 1 milliGRAM(s) Oral daily  glucagon  Injectable 1 milliGRAM(s) IntraMuscular once  heparin   Injectable 5000 Unit(s) SubCutaneous every 12 hours  insulin glargine Injectable (LANTUS) 10 Unit(s) SubCutaneous at bedtime  insulin lispro (ADMELOG) corrective regimen sliding scale   SubCutaneous three times a day before meals  levETIRAcetam  Solution 500 milliGRAM(s) Oral two times a day  midodrine. 10 milliGRAM(s) Oral three times a day  multivitamin 1 Tablet(s) Oral daily  pantoprazole    Tablet 40 milliGRAM(s) Oral before breakfast  silver sulfADIAZINE 1% Cream 1 Application(s) Topical every 12 hours  sodium bicarbonate 1300 milliGRAM(s) Oral every 8 hours  zinc sulfate 220 milliGRAM(s) Oral daily    PRN Meds  acetaminophen     Tablet .. 650 milliGRAM(s) Oral every 6 hours PRN  dextrose Oral Gel 15 Gram(s) Oral once PRN  sodium chloride 0.9% Bolus. 100 milliLiter(s) IV Bolus every 5 minutes PRN

## 2023-06-05 NOTE — PROGRESS NOTE ADULT - SUBJECTIVE AND OBJECTIVE BOX
Over Night Events: Events noted, vent dependant, afebrile, renal reviewed, LE doppler -    PHYSICAL EXAM    ICU Vital Signs Last 24 Hrs  T(C): 35.9 (05 Jun 2023 08:11), Max: 36.6 (05 Jun 2023 00:36)  T(F): 96.6 (05 Jun 2023 08:11), Max: 97.8 (05 Jun 2023 00:36)  HR: 72 (05 Jun 2023 08:11) (64 - 74)  BP: 122/68 (05 Jun 2023 08:11) (96/64 - 122/68)  BP(mean): 78 (04 Jun 2023 11:00) (78 - 78)  RR: 20 (05 Jun 2023 00:36) (20 - 20)  SpO2: 100% (05 Jun 2023 08:11) (98% - 100%)    O2 Parameters below as of 04 Jun 2023 21:13  Patient On (Oxygen Delivery Method): ventilator            General: Ill looking  HEENT: trach  Lungs: Bilateral BS  Cardiovascular: HERI 2.6  Abdomen: Soft, Positive BS  sacral ulcer  unresponsive      06-04-23 @ 07:01  -  06-05-23 @ 07:00  --------------------------------------------------------  IN:  Total IN: 0 mL    OUT:    Indwelling Catheter - Urethral (mL): 325 mL  Total OUT: 325 mL    Total NET: -325 mL          LABS:                          8.7    13.61 )-----------( 327      ( 05 Jun 2023 06:07 )             27.0                                               06-05    127<L>  |  90<L>  |  148<HH>  ----------------------------<  191<H>  4.0   |  21  |  3.3<H>    Ca    8.2<L>      05 Jun 2023 06:07  Phos  2.2     06-05  Mg     2.6     06-05    TPro  5.2<L>  /  Alb  1.9<L>  /  TBili  0.4  /  DBili  x   /  AST  16  /  ALT  <5  /  AlkPhos  169<H>  06-05                                                                                           LIVER FUNCTIONS - ( 05 Jun 2023 06:07 )  Alb: 1.9 g/dL / Pro: 5.2 g/dL / ALK PHOS: 169 U/L / ALT: <5 U/L / AST: 16 U/L / GGT: x                                                                                               Mode: AC/ CMV (Assist Control/ Continuous Mandatory Ventilation)  RR (machine): 14  TV (machine): 450  FiO2: 40  PEEP: 8  ITime: 0.9  MAP: 8  PIP: 20                                          MEDICATIONS  (STANDING):  albuterol/ipratropium for Nebulization 3 milliLiter(s) Nebulizer every 6 hours  aMIOdarone    Tablet   Oral   aMIOdarone    Tablet 200 milliGRAM(s) Oral daily  ascorbic acid 500 milliGRAM(s) Oral daily  aspirin  chewable 81 milliGRAM(s) Oral daily  atorvastatin 80 milliGRAM(s) Oral at bedtime  buMETAnide Injectable 2 milliGRAM(s) IV Push every 12 hours  chlorhexidine 0.12% Liquid 15 milliLiter(s) Oral Mucosa two times a day  chlorhexidine 2% Cloths 1 Application(s) Topical <User Schedule>  dextrose 5%. 1000 milliLiter(s) (50 mL/Hr) IV Continuous <Continuous>  dextrose 5%. 1000 milliLiter(s) (100 mL/Hr) IV Continuous <Continuous>  dextrose 50% Injectable 12.5 Gram(s) IV Push once  dextrose 50% Injectable 25 Gram(s) IV Push once  dextrose 50% Injectable 25 Gram(s) IV Push once  ferrous    sulfate Liquid 300 milliGRAM(s) Enteral Tube daily  fluconAZOLE IVPB 200 milliGRAM(s) IV Intermittent every 24 hours  folic acid 1 milliGRAM(s) Oral daily  glucagon  Injectable 1 milliGRAM(s) IntraMuscular once  heparin   Injectable 5000 Unit(s) SubCutaneous every 12 hours  insulin glargine Injectable (LANTUS) 10 Unit(s) SubCutaneous at bedtime  insulin lispro (ADMELOG) corrective regimen sliding scale   SubCutaneous three times a day before meals  levETIRAcetam  Solution 500 milliGRAM(s) Oral two times a day  midodrine. 10 milliGRAM(s) Oral three times a day  multivitamin 1 Tablet(s) Oral daily  pantoprazole    Tablet 40 milliGRAM(s) Oral before breakfast  silver sulfADIAZINE 1% Cream 1 Application(s) Topical every 12 hours  sodium bicarbonate 1300 milliGRAM(s) Oral every 8 hours  zinc sulfate 220 milliGRAM(s) Oral daily    MEDICATIONS  (PRN):  acetaminophen     Tablet .. 650 milliGRAM(s) Oral every 6 hours PRN Temp greater or equal to 38C (100.4F), Mild Pain (1 - 3)  dextrose Oral Gel 15 Gram(s) Oral once PRN Blood Glucose LESS THAN 70 milliGRAM(s)/deciliter  sodium chloride 0.9% Bolus. 100 milliLiter(s) IV Bolus every 5 minutes PRN SBP LESS THAN or EQUAL to 80 mmHg

## 2023-06-05 NOTE — CHART NOTE - NSCHARTNOTEFT_GEN_A_CORE
Transfer Note    Transfer from: CEU     Transfer to: Med   ----------------------------------------------------------------------------------------------------------  HPI :  62 yo M with  PMH of ICH (2021) s/p trach and PEG, HTN, HLD, T2DM, CAD s/p stents, Recurrent C diff, BIBEMS from [facility] for abnormal labs. Patient non-verbal at baseline - limited history. Per NH chart  have a BUN greater than 200 and creatinine of 4.3 on outpatient labs. Outpatient CXR also w/ new L sided pleural effusion. With EMS patient was also found to be in irregular rhythm, no known history of A-fib or a flutter.  In ED patient had borderline BP, and was in Atrial fibrillation. He was started on amiodarone gtt. Labs showed , Cr. 4.1, Hb 6.6. Trops 1.8. He was given 1U PRBC, gastric lavage revealed coffee ground emesis w/no active bleeding. He was started on PPI drip and GI consulted in ED. He was given IV aztreonam and vancomycin.   Patient being admitted to ICU for management of Sepsis likely from UTI, MICHELLE likely prerenal and NSTEMI.     CEu course:   Pt was in renal failure, producing urine. Confluence was placed in rt IJV and 2 sessions of HD were done. Pt is being managed medically after that. His lab parameters were showing improvement. He remained HD stable since time of admission to now. He is non verbal and opens yes to call and touch at basline. hard to assess changes in mental status, but appears to be at baseline.       --------------------------------------------------------------------------------------------------------  PMH/PSH:  PAST MEDICAL & SURGICAL HISTORY:  HTN (hypertension)    Diabetes mellitus    H/O intracranial hemorrhage    H/O tracheostomy    PEG (percutaneous endoscopic gastrostomy) status    Hyperlipidemia        -------------------------------------------------------------------------------------------------------  PHYSICAL EXAM:  General: NAD  HEENT: Atraumatic  Respiratory: CTAB  Cardiac: RRR  Abdomen: soft, non-tender, non-distended  Extremities: warm and well-perfused  Neuro: Opens eyes to touch, non verbal at baseline, doesn't follow commands    Vital Signs Last 24 Hrs  T(C): 35.9 (05 Jun 2023 08:11), Max: 36.6 (05 Jun 2023 00:36)  T(F): 96.6 (05 Jun 2023 08:11), Max: 97.8 (05 Jun 2023 00:36)  HR: 72 (05 Jun 2023 08:11) (64 - 74)  BP: 122/68 (05 Jun 2023 08:11) (96/64 - 122/68)  BP(mean): 78 (04 Jun 2023 11:00) (78 - 78)  RR: 20 (05 Jun 2023 00:36) (20 - 20)  SpO2: 100% (05 Jun 2023 08:11) (98% - 100%)    Parameters below as of 04 Jun 2023 21:13  Patient On (Oxygen Delivery Method): ventilator        I&O's Summary    04 Jun 2023 07:01  -  05 Jun 2023 07:00  --------------------------------------------------------  IN: 0 mL / OUT: 325 mL / NET: -325 mL        --------------------------------------------------------------------------------------------------------  LABS:                               8.7    13.61 )-----------( 327      ( 05 Jun 2023 06:07 )             27.0       06-05    127<L>  |  90<L>  |  148<HH>  ----------------------------<  191<H>  4.0   |  21  |  3.3<H>    Ca    8.2<L>      05 Jun 2023 06:07  Phos  2.2     06-05  Mg     2.6     06-05    TPro  5.2<L>  /  Alb  1.9<L>  /  TBili  0.4  /  DBili  x   /  AST  16  /  ALT  <5  /  AlkPhos  169<H>  06-05        CULTURE RESULTS:                05-30-23 @ 21:15  Specimen Source: --  Method Type: --  Gram Stain - RRL: --  Gram Stain - Wound: --  Bacteria: Negative  Culture Results:   >100,000 CFU/ml Candida tropicalis "Susceptibilities not performed"      Specimen Source:   Method Type:   Gram Stain:   Culture Results: Culture Results:   >100,000 CFU/ml Candida tropicalis "Susceptibilities not performed" (05-30-23 @ 21:15)  Culture Results:   No Growth Final (05-30-23 @ 20:09)  Culture Results:   No Growth Final (05-30-23 @ 20:09)    Bacteria: Bacteria: Negative (05-30-23 @ 21:15)        -------------------------------------------------------------------------------------------------  RADIOLOGY:      ---------------------------------------------------------------------------------------------------  ASSESSMENT & PLAN:   #MICHELLE (pre-renal Vs ATN) on CKD   #Fluid overload  #HAGMA 2/2 renal failure  #?Sepsis POA 2/2 UTI  - Cr 4.6 (baseline 1.1 in 10/22)  - non oliguric (has good urine output)   - RBUS: unremarkable  - c/w bumex 2mg iv q12hr  - c/w midodrine 10mg q8hr per nephro as BP is soft   - AG 26; bicarb 12; lactate trended down to normal  - c/w sodium bicarb 1300mg PO TID  - Follow up with nephro   - On abx: cefepime and vanc (renally dosed) for empiric coverage: dc when no longed needed  - UA equivocal for UTI (high WBC and no bacteria)  - BCx negative  - F/u UCx    #Acute anemia  - HgB 6.7 (baseline 8) s/p 2U pRBC - improved to 9.9  - evaluated by GI - no evidence of GI bleed (PEG tube flushed with returin of bile and no blood)  - Monitor H/H closely  - Active T+S  - Goal HgB > 7    #Hyponatremia   - likely hypervolemic from acute kidney dysfunction Vs ?new CHF  - c/w bumex 2mg iv q12hr -per nephro    #Afib w/ RVR  - currently rate controlled s/p amio gtt  - c/w PO amio 400mg q8hr    #CAD s/p stents  #NSTEMI  - Trops 1.80-->1.63-->1.57-->1.55  - seen by cardio  - c/w asa and statin  - elevated trops likely from demand ischemia    #DKA  - BHB 2.2; AG 26 - trending down   - started on insulin gtt when FS > 300 but patient's glucose started to drop to <100 so Insulin gtt was stopped and started tube feeds  - At this time elevated anion gap is more from underlying acute kidney dysfunction   - c/w insulin sliding scale  - Closely monitor finger sticks    #Hx ICH s/p trach and PEG  - c/w keppra 500mg BID for seizure prophylaxis    Diet: NPO with PEG feeds  DVT px - heparin  GI px - protonix.  DNR, on trach       FOR FOLLOW UP:  - Nephro follow   - Follow routine labs

## 2023-06-05 NOTE — PROGRESS NOTE ADULT - SUBJECTIVE AND OBJECTIVE BOX
HPI:  62 yo M with  PMH of ICH (2021) s/p trach and PEG, HTN, HLD, T2DM, CAD s/p stents, Recurrent C diff, BIBEMS from [facility] for abnormal labs. Patient non-verbal at baseline - limited history. Per NH chart  have a BUN greater than 200 and creatinine of 4.3 on outpatient labs. Outpatient CXR also w/ new L sided pleural effusion. With EMS patient was also found to be in irregular rhythm, no known history of A-fib or a flutter.  In ED patient had borderline BP, and was in Atrial fibrillation. He was started on amiodarone gtt. Labs showed , Cr. 4.1, Hb 6.6. Trops 1.8. He was given 1U PRBC, gastric lavage revealed coffee ground emesis w/no active bleeding. He was started on PPI drip and GI consulted in ED. He was given IV aztreonam and vancomycin.   Patient being admitted to ICU for management of Sepsis likely from UTI, MICHELLE likely prerenal and NSTEMI.         Interval History  -Patient seen at bedside  -He was unable to participate in exam      ADVANCE DIRECTIVES:    [ ] Full Code [x ] DNR [ ] MOLST [ ] Living Will     DECISION MAKER(s):  [ ] Health Care Proxy(s)  [ ] Surrogate(s)  [ ] Guardian           Name(s): Phone Number(s): Wife Sandrita Bauer    BASELINE (I)ADL(s) (prior to admission):  [place]: [ ]Total  [ ] Moderate [ ]Dependent  Palliative Performance Status Version 2:    20     %    http://npcrc.org/files/news/palliative_performance_scale_ppsv2.pdf    Allergies    penicillin (Other)    Intolerances    MEDICATIONS  (STANDING):  albuterol/ipratropium for Nebulization 3 milliLiter(s) Nebulizer every 6 hours  aMIOdarone    Tablet   Oral   aMIOdarone    Tablet 200 milliGRAM(s) Oral daily  ascorbic acid 500 milliGRAM(s) Oral daily  aspirin  chewable 81 milliGRAM(s) Oral daily  atorvastatin 80 milliGRAM(s) Oral at bedtime  buMETAnide Injectable 2 milliGRAM(s) IV Push every 12 hours  chlorhexidine 0.12% Liquid 15 milliLiter(s) Oral Mucosa two times a day  chlorhexidine 2% Cloths 1 Application(s) Topical <User Schedule>  dextrose 5%. 1000 milliLiter(s) (50 mL/Hr) IV Continuous <Continuous>  dextrose 5%. 1000 milliLiter(s) (100 mL/Hr) IV Continuous <Continuous>  dextrose 50% Injectable 12.5 Gram(s) IV Push once  dextrose 50% Injectable 25 Gram(s) IV Push once  dextrose 50% Injectable 25 Gram(s) IV Push once  ferrous    sulfate Liquid 300 milliGRAM(s) Enteral Tube daily  fluconAZOLE IVPB 200 milliGRAM(s) IV Intermittent every 24 hours  folic acid 1 milliGRAM(s) Oral daily  glucagon  Injectable 1 milliGRAM(s) IntraMuscular once  heparin   Injectable 5000 Unit(s) SubCutaneous every 12 hours  insulin glargine Injectable (LANTUS) 10 Unit(s) SubCutaneous at bedtime  insulin lispro (ADMELOG) corrective regimen sliding scale   SubCutaneous three times a day before meals  levETIRAcetam  Solution 500 milliGRAM(s) Oral two times a day  midodrine. 10 milliGRAM(s) Oral three times a day  multivitamin 1 Tablet(s) Oral daily  pantoprazole    Tablet 40 milliGRAM(s) Oral before breakfast  silver sulfADIAZINE 1% Cream 1 Application(s) Topical every 12 hours  sodium bicarbonate 1300 milliGRAM(s) Oral every 8 hours  zinc sulfate 220 milliGRAM(s) Oral daily    MEDICATIONS  (PRN):  acetaminophen     Tablet .. 650 milliGRAM(s) Oral every 6 hours PRN Temp greater or equal to 38C (100.4F), Mild Pain (1 - 3)  dextrose Oral Gel 15 Gram(s) Oral once PRN Blood Glucose LESS THAN 70 milliGRAM(s)/deciliter  sodium chloride 0.9% Bolus. 100 milliLiter(s) IV Bolus every 5 minutes PRN SBP LESS THAN or EQUAL to 80 mmHg    PRESENT SYMPTOMS: [x ]Unable to obtain due to poor mentation   Source if other than patient:  [ ]Family   [ ]Team     Pain: [ ]yes [ ]no  QOL impact -   Location -                    Aggravating factors -  Quality -  Radiation -  Timing-  Severity (0-10 scale):  Minimal acceptable level (0-10 scale):     CPOT:  0  https://www.[facility].org/getattachment/[email or web address]/Critical-Care-Pain-Observation-Tool-(CPOT)    PAIN AD Score:   http://geriatrictoolkit.[email or web address]/painad.pdf (press ctrl +  left click to view)    Dyspnea:                           [ ]None[ ]Mild [ ]Moderate [ ]Severe     Respiratory Distress Observation Scale (RDOS): 0  A score of 0 to 2 signifies little or no respiratory distress, 3 signifies mild distress, scores 4 to 6 indicate moderate distress, and scores greater than 7 signify severe distress  https://[email or web address].ca/sites/default/files/PDFS/360913-qetljxguezv-nyxuqluv-lhmwlrgbxof-lxvco.pdf    Anxiety:                             [ ]None[ ]Mild [ ]Moderate [ ]Severe   Fatigue:                             [ ]None[ ]Mild [ ]Moderate [ ]Severe   Nausea:                             [ ]None[ ]Mild [ ]Moderate [ ]Severe   Loss of appetite:              [ ]None[ ]Mild [ ]Moderate [ ]Severe   Constipation:                    [ ]None[ ]Mild [ ]Moderate [ ]Severe    Other Symptoms:  [ ]All other review of systems negative     Palliative Performance Status Version 2:   10      %    http://npcrc.org/files/news/palliative_performance_scale_ppsv2.pdf  PHYSICAL EXAM:  Vital Signs Last 24 Hrs  T(C): 36 (05 Jun 2023 11:40), Max: 36.6 (05 Jun 2023 00:36)  T(F): 96.8 (05 Jun 2023 11:40), Max: 97.8 (05 Jun 2023 00:36)  HR: 72 (05 Jun 2023 11:40) (68 - 74)  BP: 117/63 (05 Jun 2023 11:40) (96/64 - 122/68)  BP(mean): --  RR: 20 (05 Jun 2023 11:40) (20 - 20)  SpO2: 100% (05 Jun 2023 11:40) (98% - 100%)    Parameters below as of 04 Jun 2023 21:13  Patient On (Oxygen Delivery Method): ventilator     I&O's Summary    04 Jun 2023 07:01  -  05 Jun 2023 07:00  --------------------------------------------------------  IN: 0 mL / OUT: 325 mL / NET: -325 mL        GENERAL:  [ ] No acute distress [ ]Lethargic  [x ]Unarousable  [ ]Verbal  [ ]Non-Verbal [ ]Cachexia    BEHAVIORAL/PSYCH:  [ ]Alert and Oriented x  [ ] Anxiety [ ] Delirium [ ] Agitation [x ] Calm   EYES: [ ] No scleral icterus [ ] Scleral icterus [ x] Closed  ENMT:  [ ]Dry mouth  [ ]No external oral lesions [ ] No external ear or nose lesions  CARDIOVASCULAR:  [ ]Regular [ ]Irregular [ ]Tachy [x ]Not Tachy  [ ]Bridger [ ] Edema [ ] No edema  PULMONARY:  [ ]Tachypnea  [ ]Audible excessive secretions [x ] No labored breathing [ ] mildly labored breathing [ ] labored breathing  GASTROINTESTINAL: [ ]Soft  [ ]Distended  [x ]Not distended [ ]Non tender [ ]Tender  MUSCULOSKELETAL: [ ]No clubbing [ ] clubbing  [x ] No cyanosis [ ] cyanosis  NEUROLOGIC: [ ]No focal deficits  [ ]Follows commands  [x ]Does not follow commands  [ ]Cognitive impairment  [ ]Dysphagia  [ ]Dysarthria  [ ]Paresis   SKIN: [ ] Jaundiced [ ] Non-jaundiced [ ]Rash [ ]No Rash [ ] Warm [ ] Dry  MISC/LINES: [ ] ET tube [ ] Trach [ ]NGT/OGT [x ]PEG [x ]Barney    CRITICAL CARE:  [ ] Shock Present  [ ]Septic [ ]Cardiogenic [ ]Neurologic [ ]Hypovolemic  [ ]  Vasopressors [ ]  Inotropes   [ ]Respiratory failure present [ ]Mechanical ventilation [ ]Non-invasive ventilatory support [ ]High flow  [ ]Acute  [ ]Chronic [ ]Hypoxic  [ ]Hypercarbic [ ]Other  [ ]Other organ failure     LABS: reviewed by me                        8.7    13.61 )-----------( 327      ( 05 Jun 2023 06:07 )             27.0   06-05    127<L>  |  90<L>  |  148<HH>  ----------------------------<  191<H>  4.0   |  21  |  3.3<H>    Ca    8.2<L>      05 Jun 2023 06:07  Phos  2.2     06-05  Mg     2.6     06-05    TPro  5.2<L>  /  Alb  1.9<L>  /  TBili  0.4  /  DBili  x   /  AST  16  /  ALT  <5  /  AlkPhos  169<H>  06-05        RADIOLOGY & ADDITIONAL STUDIES: reviewed by me    < from: Xray Chest 1 View- PORTABLE-Routine (Xray Chest 1 View- PORTABLE-Routine in AM.) (06.02.23 @ 07:03) >  PROCEDURE DATE:  06/02/2023          INTERPRETATION:  Clinical History / Reason for exam: f/u    Comparison : Chest radiograph: 6/1/2023    Technique/Positioning: Frontal view of the chest    Findings:    Support devices: Stable positioning    Cardiac/mediastinum/hilum: No significant change    Skeleton/soft tissues: No significant change.    Lung parenchyma/Pleura: Low lung volume Stable bilateral opacitiesand   effusions.    Impression:  Low lung volume Stable bilateral opacities and effusions.            --- End of Report ---    < end of copied text >      EKG: reviewed by me    < from: 12 Lead ECG (05.31.23 @ 02:11) >  Ventricular Rate 85 BPM    Atrial Rate 85 BPM    P-R Interval 96 ms    QRS Duration 94 ms    Q-T Interval 358 ms    QTC Calculation(Bazett) 426 ms    P Axis 46 degrees    R Axis 15 degrees    T Axis 20 degrees    Diagnosis Line Sinus rhythm with short NC  Otherwise normal ECG    < end of copied text >        PROTEIN CALORIE MALNUTRITION PRESENT: [ ]mild [ ]moderate [ ]severe [ ]underweight [ ]morbid obesity  https://www.andeal.org/vault/2440/web/files/ONC/Table_Clinical%20Characteristics%20to%20Document%20Malnutrition-White%20JV%20et%20al%320342.pdf    Height (cm): 170.2 (06-01-23 @ 09:07), 180.3 (10-04-22 @ 22:39)  Weight (kg): 102 (05-31-23 @ 06:26), 66.4 (10-04-22 @ 22:39)  BMI (kg/m2): 35.2 (06-01-23 @ 09:07), 31.4 (05-31-23 @ 06:26), 20.4 (10-04-22 @ 22:39)    [ ]PPSV2 < or = to 30% [ ]significant weight loss  [ ]poor nutritional intake  [ ]anasarca      [ ]Artificial Nutrition      Patient and/or family assessed for spiritual and social needs     REFERRALS:   [ ]Chaplaincy  [ ]Hospice  [ ]Child Life  [x ]Social Work  [ ]Case management [ ]Holistic Therapy     Patient discussed with primary medical team MD  Palliative care education provided to patient and/or family

## 2023-06-05 NOTE — PROGRESS NOTE ADULT - ASSESSMENT
64 yo M with  PMH of ICH (2021) s/p trach and PEG, HTN, HLD, T2DM, CAD s/p stents, Recurrent C diff, BIBEMS from [facility] for abnormal labs. Patient non-verbal at baseline - limited history. Per NH chart  have a BUN greater than 200 and creatinine of 4.3 on outpatient labs. Outpatient CXR also w/ new L sided pleural effusion. With EMS patient was also found to be in irregular rhythm, no known history of A-fib or a flutter.    MICHELLE on CKD 3a - severe azotemia  likely due to GIB / hypotension  Started HD on 6/1/23  Hyponatremia / hypokalemia  Acute anemia d/t UGIB  Troponemia  Lactic acidosis  DKA   Afib     - HD today 3h UF 1l as tolerated   - cont strict i/o   - continue bumex   - limit free water / dilute drips  - BP noted / continue midodrine   - ph noted / no binders   - very poor prognosis / GOC  will follow

## 2023-06-06 NOTE — ADVANCED PRACTICE NURSE CONSULT - RECOMMEDATIONS
Cleanse wound to sacrum with Vashe wound cleanser (stocked in the store room)  Pack with hydrogel, Vashe moistened gauze, cover with abdominal pad twice a day, prn for soiling    Cleanse wounds to bilateral buttocks with soap and water  Pat dry, continue to apply silvadene as ordered, gauze, cover with Allevyn twice a day, prn for soiling    Cleanse wounds to bilateral lateral ankle with soap and water  Pat dry apply triad, gauze, and Allevyn twice a day, prn for soiling  Apply offloading boots    Cleanse wound to upper back with soap and water  Pat dry, apply triad, gauze and Allevyn twice a day, prn for soiling    Cleanse abrasions to scrotum with soap and water  Pat dry, apply bacitracin, leave open to air, twice a day    Maintain pressure injury prevention.   Keep skin clean.  Maintain incontinence care.   Monitor wound for changes and notify provider   Case discussed with primary RN

## 2023-06-06 NOTE — PROGRESS NOTE ADULT - SUBJECTIVE AND OBJECTIVE BOX
Nephrology progress note    Patient was seen and examined, events over the last 24 h noted .    Allergies:  penicillin (Other)    Hospital Medications:   MEDICATIONS  (STANDING):  albuterol/ipratropium for Nebulization 3 milliLiter(s) Nebulizer every 6 hours  aMIOdarone    Tablet 200 milliGRAM(s) Oral daily  aMIOdarone    Tablet   Oral   ascorbic acid 500 milliGRAM(s) Oral daily  aspirin  chewable 81 milliGRAM(s) Oral daily  atorvastatin 80 milliGRAM(s) Oral at bedtime  buMETAnide Injectable 2 milliGRAM(s) IV Push every 12 hours  chlorhexidine 0.12% Liquid 15 milliLiter(s) Oral Mucosa two times a day  chlorhexidine 2% Cloths 1 Application(s) Topical <User Schedule>  dextrose 5%. 1000 milliLiter(s) (50 mL/Hr) IV Continuous <Continuous>  dextrose 5%. 1000 milliLiter(s) (100 mL/Hr) IV Continuous <Continuous>  dextrose 50% Injectable 25 Gram(s) IV Push once  dextrose 50% Injectable 25 Gram(s) IV Push once  dextrose 50% Injectable 12.5 Gram(s) IV Push once  ferrous    sulfate Liquid 300 milliGRAM(s) Enteral Tube daily  fluconAZOLE   40 mG/mL Suspension 200 milliGRAM(s) Enteral Tube every 24 hours  folic acid 1 milliGRAM(s) Oral daily  glucagon  Injectable 1 milliGRAM(s) IntraMuscular once  heparin   Injectable 5000 Unit(s) SubCutaneous every 12 hours  insulin glargine Injectable (LANTUS) 10 Unit(s) SubCutaneous at bedtime  insulin lispro (ADMELOG) corrective regimen sliding scale   SubCutaneous three times a day before meals  levETIRAcetam  Solution 500 milliGRAM(s) Oral two times a day  midodrine. 10 milliGRAM(s) Oral three times a day  multivitamin 1 Tablet(s) Oral daily  pantoprazole    Tablet 40 milliGRAM(s) Oral before breakfast  silver sulfADIAZINE 1% Cream 1 Application(s) Topical every 12 hours  sodium bicarbonate 1300 milliGRAM(s) Oral every 8 hours  zinc sulfate 220 milliGRAM(s) Oral daily        VITALS:  T(F): 98.2 (23 @ 14:00), Max: 98.6 (23 @ 20:30)  HR: 80 (23 @ 14:00)  BP: 118/65 (23 @ 14:00)  RR: 18 (23 @ 14:00)  SpO2: 100% (23 @ 14:00)  Wt(kg): --     @ 07:01  -   @ 07:00  --------------------------------------------------------  IN: 0 mL / OUT: 325 mL / NET: -325 mL     @ 07:01  -   @ 07:00  --------------------------------------------------------  IN: 870 mL / OUT: 1000 mL / NET: -130 mL          PHYSICAL EXAM:  Constitutional: NAD  HEENT: anicteric sclera, oropharynx clear, MMM  Neck: No JVD  Respiratory: CTAB, no wheezes, rales or rhonchi  Cardiovascular: S1, S2, RRR  Gastrointestinal: BS+, soft, NT/ND  Extremities: No cyanosis or clubbing. No peripheral edema  :  No teixeira.   Skin: No rashes    LABS:      131<L>  |  96<L>  |  87<HH>  ----------------------------<  184<H>  4.3   |  23  |  2.5<H>    Ca    8.0<L>      2023 08:18  Phos  2.2     06-  Mg     2.3         TPro  5.0<L>  /  Alb  1.6<L>  /  TBili  0.4  /  DBili      /  AST  48<H>  /  ALT  7   /  AlkPhos  165<H>                            7.8    9.81  )-----------( 325      ( 2023 08:18 )             24.6       Urine Studies:  Urinalysis Basic - ( 30 May 2023 21:15 )    Color: Jazmine / Appearance: Turbid / S.023 / pH:   Gluc:  / Ketone: Trace  / Bili: Negative / Urobili: 6 mg/dL   Blood:  / Protein: 100 mg/dL / Nitrite: Negative   Leuk Esterase: Large / RBC: 355 /HPF /  /HPF   Sq Epi:  / Non Sq Epi:  / Bacteria: Negative      Sodium, Random Urine: 96.0 mmoL/L ( @ 05:50)  Osmolality, Random Urine: 347 mos/kg ( @ 05:50)  Creatinine, Random Urine: 10 mg/dL ( @ 05:50)    RADIOLOGY & ADDITIONAL STUDIES:

## 2023-06-06 NOTE — PROGRESS NOTE ADULT - ASSESSMENT
62 yo M with  PMH of ICH (2021) s/p trach and PEG, HTN, HLD, T2DM, CAD s/p stents, Recurrent C diff, BIBEMS from [facility] for abnormal labs. Patient non-verbal at baseline - limited history. Per NH chart  have a BUN greater than 200 and creatinine of 4.3 on outpatient labs. Outpatient CXR also w/ new L sided pleural effusion. With EMS patient was also found to be in irregular rhythm, no known history of A-fib or a flutter.    MICHELLE on CKD 3a - severe azotemia  likely due to GIB / hypotension  Started HD on 6/1/23  Hyponatremia / hypokalemia  Acute anemia d/t UGIB  Troponemia  Lactic acidosis  DKA   Afib     - HD yesterday no indication today  - cont strict i/o   - continue bumex   - limit free water / dilute drips  - BP noted / continue midodrine   - ph noted / no binders   - very poor prognosis / GOC  will follow

## 2023-06-06 NOTE — PROGRESS NOTE ADULT - ATTENDING COMMENTS
62 yo M with  PMH of ICH (2021) s/p trach and PEG, HTN, HLD, T2DM, CAD s/p stents, Recurrent C diff, BIBEMS from [facility] for abnormal labs. Patient non-verbal at baseline - limited history. Per NH chart  have a BUN greater than 200 and creatinine of 4.3 on outpatient labs. Outpatient CXR also w/ new L sided pleural effusion. In ED, patient was hypotensive with Afib with RVR, found to be in acure renal failure, anemic with elevated trops. He was started on Amiodarone GTT, PPI GTT, gastric lavage with coffee ground secretions, started on broad spectrum abx and admitted to MICU  While in MICU, found to be in DKA, s/p insulin drip, MICHELLE started on HD, acute anemia s/p PRBC transfusion, NSTEMI with downtrending trops, now downgraded to SDU    # Acute Renal Failure, started on HD 6/1  Fluid Overload  HAGMA  - non oliguric, RBUS without hydro   - started on HD 6/1 via [facility] [place]   - c/w bumex 2mg iv q12hr, HD per renal--> HD today   - c/w midodrine 10mg q8hr  - c/w sodium bicarb 1300mg PO TID  -  TTE 6/1 - EF 63%, GIDD   - nephrology following     # Candida tropicalis UTI  s/p cefepime and vanc   Blood clts neg, URine with Candida tropicalis   on  fluconazole, ID following ? change from IV to PO via g tube      #Acute on chronic  anemia  - HgB was 6.7 (baseline 8) s/p 2U pRBC - improved to 9.9  Hemoglobin: 7.8 g/dL (06-06-23 @ 08:18)  Hemoglobin: 8.7 g/dL (06-05-23 @ 06:07)  Hemoglobin: 8.3 g/dL (06-04-23 @ 06:57)  Hemoglobin: 9.0 g/dL (06-03-23 @ 06:50)    if hgb <7 transfuse again    probably due to hematuria plus chronic disease baseline anemia     # Hyponatremia, likely hypervolemic  - c/w bumex 2mg iv q12hr and HD per nephro  - daily BMP    # Afib w/ RVR - now rate controlled   - currently rate controlled s/p amio gtt  - change amio to 200 q24H   - not on ac due to anemia    # NSTEMI  hx of cad with stents   - Trops 1.80-->1.63-->1.57-->1.55  - seen by cardio,  c/w asa and statin, medical management     # DKA, resolved, DM controlled   - s/p insulin drip, now on basal/bolus and tube feeds. Monitor FS   CAPILLARY BLOOD GLUCOSE    POCT Blood Glucose.: 191 mg/dL (06 Jun 2023 12:14)  POCT Blood Glucose.: 189 mg/dL (06 Jun 2023 08:04)  POCT Blood Glucose.: 147 mg/dL (06 Jun 2023 06:30)  POCT Blood Glucose.: 102 mg/dL (05 Jun 2023 22:07)  POCT Blood Glucose.: 134 mg/dL (05 Jun 2023 17:31)    # Hx ICH s/p trach and PEG  Bedbound from NH   - c/w keppra 500mg BID for seizure prophylaxis    DNR , prognosis is poor     #Progress Note Handoff  Pending :  clinical improvement, nephro and ID followup   Family discussion: team discussed with family   Disposition: SNF

## 2023-06-06 NOTE — PROGRESS NOTE ADULT - ASSESSMENT
62 yo M with  PMH of ICH (2021) s/p trach and PEG, HTN, HLD, T2DM, CAD s/p stents, Recurrent C diff, BIBEMS from [place] [facility] for abnormal labs. Patient non-verbal at baseline - limited history. Per NH chart  have a BUN greater than 200 and creatinine of 4.3 on outpatient labs. Outpatient CXR also w/ new L sided pleural effusion. In ED, patient was hypotensive with Afib with RVR, found to be in acure renal failure, anemic with elevated trops. He was started on Amiodarone GTT, PPI GTT, gastric lavage with coffee ground secretions, started on broad spectrum abx and admitted to MICU  While in MICU, found to be in DKA, s/p insulin drip, MICHELLE started on HD, acute anemia s/p PRBC transfusion, NSTEMI with downtrending trops, now downgraded to SDU    Acute Renal Failure, started on HD 6/1  Fluid Overload  HAGMA  - non oliguric, RBUS without hydro   - started on HD 6/1 via R [facility] [place]   - c/w bumex 2mg iv q12hr, HD per renal--> HD today   - c/w midodrine 10mg q8hr  - c/w sodium bicarb 1300mg PO TID  -  TTE 6/1 - EF 63%, GIDD   - nephrology following     Candida tropicalis UTI  s/p cefepime and vanc   Blood clts neg, URine with Candida tropicalis   on  fluconazole, ID following can change from IV to PO via gtube     Acute anemia  - HgB 6.7 (baseline 8) s/p 2U pRBC - improved to 9.9  - evaluated by GI - no evidence of GI bleed (PEG tube flushed with return of bile and no blood)  - Monitor H/H closely  - Active T+S    Hyponatremia, likely hypervolemic  - c/w bumex 2mg iv q12hr and HD per nephro  - daily BMP    Afib w/ RVR - now rate controlled   - currently rate controlled s/p amio gtt  - change amio to 200 q24H     CAD s/p stents  NSTEMI  - Trops 1.80-->1.63-->1.57-->1.55  - seen by cardio,  c/w asa and statin, medical management     DKA, resolved  - s/p insulin drip, now on basal/bolus and tube feeds. Monitor FS     Hx ICH s/p trach and PEG  Bedbound from NH   - c/w keppra 500mg BID for seizure prophylaxis    DNR only, overall prognosis is poor  Pending: HD per renal, diuresis, labs, iv abx 62 yo M with  PMH of ICH (2021) s/p trach and PEG, HTN, HLD, T2DM, CAD s/p stents, Recurrent C diff, BIBEMS from [facility] for abnormal labs. Patient non-verbal at baseline - limited history. Per NH chart  have a BUN greater than 200 and creatinine of 4.3 on outpatient labs. Outpatient CXR also w/ new L sided pleural effusion. In ED, patient was hypotensive with Afib with RVR, found to be in acure renal failure, anemic with elevated trops. He was started on Amiodarone GTT, PPI GTT, gastric lavage with coffee ground secretions, started on broad spectrum abx and admitted to MICU  While in MICU, found to be in DKA, s/p insulin drip, MICHELLE started on HD, acute anemia s/p PRBC transfusion, NSTEMI with downtrending trops, now downgraded to SDU    Acute Renal Failure, started on HD 6/1  Fluid Overload  HAGMA  - non oliguric, RBUS without hydro   - started on HD 6/1 via R [facility] [place]   - Cr improving, f/u Nephro if need further HD  - c/w bumex 2mg iv q12hr, HD per renal--> HD today   - c/w midodrine 10mg q8hr  - c/w sodium bicarb 1300mg PO TID  -  TTE 6/1 - EF 63%, GIDD   - nephrology following     Candida tropicalis UTI  s/p cefepime and vanc   Blood clts neg, URine with Candida tropicalis   on  fluconazole, ID following can change from IV to PO via gtube     Acute anemia  - HgB 6.7 (baseline 8) s/p 2U pRBC - improved to 9.9  - evaluated by GI - no evidence of GI bleed (PEG tube flushed with return of bile and no blood)  - Monitor H/H closely  - Active T+S    Hyponatremia, likely hypervolemic  - c/w bumex 2mg iv q12hr and HD per nephro  - daily BMP    Afib w/ RVR - now rate controlled   - currently rate controlled s/p amio gtt  - change amio to 200 q24H     CAD s/p stents  NSTEMI  - Trops 1.80-->1.63-->1.57-->1.55  - seen by cardio,  c/w asa and statin, medical management     DKA, resolved  - s/p insulin drip, now on basal/bolus and tube feeds. Monitor FS     Hx ICH s/p trach and PEG  Bedbound from NH   - c/w keppra 500mg BID for seizure prophylaxis    DNR only, overall prognosis is poor  Pending: HD per renal, diuresis, labs, iv abx

## 2023-06-06 NOTE — ADVANCED PRACTICE NURSE CONSULT - ASSESSMENT
History of Present Illness:   64 yo M with  PMH of ICH (2021) s/p trach and PEG, HTN, HLD, T2DM, CAD s/p stents, Recurrent C diff, BIBEMS from [facility] for abnormal labs. Patient non-verbal at baseline - limited history. Per NH chart  have a BUN greater than 200 and creatinine of 4.3 on outpatient labs. Outpatient CXR also w/ new L sided pleural effusion. With EMS patient was also found to be in irregular rhythm, no known history of A-fib or a flutter.  In ED patient had borderline BP, and was in Atrial fibrillation. He was started on amiodarone gtt. Labs showed , Cr. 4.1, Hb 6.6. Trops 1.8. He was given 1U PRBC, gastric lavage revealed coffee ground emesis w/no active bleeding. He was started on PPI drip and GI consulted in ED. He was given IV aztreonam and vancomycin.   Patient being admitted to ICU for management of Sepsis likely from UTI, MICHELLE likely prerenal and NSTEMI.   Allergies and Intolerances:        Allergies:  	penicillin: Drug, Other, unknown reaction  	Received Cefazolin on 11/2021 for pre and post procedure without reported reaction    Patient History:    Past Medical, Past Surgical, and Family History:  PAST MEDICAL HISTORY:  Diabetes mellitus   H/O intracranial hemorrhage   H/O tracheostomy   HTN (hypertension)   Hyperlipidemia   PEG (percutaneous endoscopic gastrostomy) status.    Patient received lying in bed. Trached. Limited mobility. Incontinent of stool, teixeira in place. High risk for pressure injury.    Wound #1  Type of Wound: Stage 4 Pressure Injury  Location: Sacrum  Measurements: ~05map5scm6.5cm  Tunneling /Undermining: Undermining ~7-12 o'clock   Wound bed: Pink with slough present  Wound edges: Intact   Periwound: Intact  Wound exudate: None  Wound odor: Malodorous   Induration, erythema, warmth: No  Wound pain: No    Wound #2  Type of Wound: Unstageable Pressure Injury  Location: Left buttock  Measurements: ~9qfb8mo  Tunneling /Undermining: No  Wound bed: Yellow to brown devitalized tissue  Wound edges: Intact  Periwound: Intact  Wound exudate: None  Wound odor: No  Induration, erythema, warmth: No  Wound pain: No    Wound #3  Type of Wound: Unstageable Pressure Injury  Location: Right buttock  Measurements: ~2ify2bw  Tunneling /Undermining: No  Wound bed: Yellow to brown devitalized tissue  Wound edges: Intact  Periwound: Intact  Wound exudate: None  Wound odor: No   Induration, erythema, warmth: No  Wound pain: No    Wound #4  Type of Wound: Stage 2 Pressure Injury  Location: Upper back  Measurements: ~7yoj0xo  Tunneling /Undermining: No  Wound bed: Pink, no necrotic tissue  Wound edges: Macerated  Periwound: Intact  Wound exudate: None  Wound odor: No  Induration, erythema, warmth: No  Wound pain: No    Wound #5  Stage 2 Pressure Injury to left lateral ankle ~7ptw6pk. Wound bed pink no necrotic tissue. No odor, erythema, or warmth no exudate.  Wound #6  Stage 2 Pressure Injury to right lateral ankle ~9mug8iu. Wound bed pink no necrotic tissue. No odor, erythema, warmth or exudate.    Blanchable redness to bilateral heels at time of assessment.   Abrasions noted to scrotum. No exudate, no odor.

## 2023-06-06 NOTE — PROGRESS NOTE ADULT - SUBJECTIVE AND OBJECTIVE BOX
DAILY PROGRESS NOTE  ===========================================================    Patient Information:  ALICIA BARBOUR  /  63y  /  Male  /  MRN#: [id number]    Patient is a 63y old Male who presents with a chief complaint of Abnormal labs (05 Jun 2023 14:02)      Hospital Day: 7d     |:::::::::::::::::::::::::::| SUBJECTIVE |:::::::::::::::::::::::::::|    OVERNIGHT EVENTS: none.  TODAY: Patient was seen today at bedside. Review of systems is otherwise negative.    |:::::::::::::::::::::::::::| OBJECTIVE |:::::::::::::::::::::::::::|    VITAL SIGNS: Last 24 Hours  T(C): 36.6 (06 Jun 2023 05:11), Max: 37 (05 Jun 2023 20:30)  T(F): 97.8 (06 Jun 2023 05:11), Max: 98.6 (05 Jun 2023 20:30)  HR: 76 (06 Jun 2023 05:11) (67 - 76)  BP: 98/56 (06 Jun 2023 05:11) (98/56 - 159/75)  BP(mean): --  RR: 16 (06 Jun 2023 05:11) (16 - 20)  SpO2: 100% (06 Jun 2023 05:11) (99% - 100%)    06-05-23 @ 07:01  -  06-06-23 @ 07:00  --------------------------------------------------------  IN: 870 mL / OUT: 1000 mL / NET: -130 mL      PHYSICAL EXAM:  GENERAL: Arousable, trached/vented, peg tube, teixeira  HEENT: Head NC/AT  CARDIO: Regular rate; Regular rhythm; S1 & S2.  RESP: No rales, wheezing, or rhonchi appreciated.  GI: Soft; NT/ND; BS  EXT: No edema.   SKIN: Intact, no rashes, no erythema    LAB RESULTS:                        8.7    13.61 )-----------( 327      ( 05 Jun 2023 06:07 )             27.0     06-05    127<L>  |  90<L>  |  148<HH>  ----------------------------<  191<H>  4.0   |  21  |  3.3<H>    Ca    8.2<L>      05 Jun 2023 06:07  Phos  2.2     06-05  Mg     2.6     06-05    TPro  5.2<L>  /  Alb  1.9<L>  /  TBili  0.4  /  DBili  x   /  AST  16  /  ALT  <5  /  AlkPhos  169<H>  06-05                MICROBIOLOGY:    RADIOLOGY:    ALLERGIES:  penicillin (Other)    MEDICATIONS:  acetaminophen     Tablet .. 650 milliGRAM(s) Oral every 6 hours PRN  albuterol/ipratropium for Nebulization 3 milliLiter(s) Nebulizer every 6 hours  aMIOdarone    Tablet   Oral   aMIOdarone    Tablet 200 milliGRAM(s) Oral daily  ascorbic acid 500 milliGRAM(s) Oral daily  aspirin  chewable 81 milliGRAM(s) Oral daily  atorvastatin 80 milliGRAM(s) Oral at bedtime  buMETAnide Injectable 2 milliGRAM(s) IV Push every 12 hours  chlorhexidine 0.12% Liquid 15 milliLiter(s) Oral Mucosa two times a day  chlorhexidine 2% Cloths 1 Application(s) Topical <User Schedule>  dextrose 5%. 1000 milliLiter(s) IV Continuous <Continuous>  dextrose 5%. 1000 milliLiter(s) IV Continuous <Continuous>  dextrose 50% Injectable 25 Gram(s) IV Push once  dextrose 50% Injectable 25 Gram(s) IV Push once  dextrose 50% Injectable 12.5 Gram(s) IV Push once  dextrose Oral Gel 15 Gram(s) Oral once PRN  ferrous    sulfate Liquid 300 milliGRAM(s) Enteral Tube daily  fluconAZOLE IVPB 200 milliGRAM(s) IV Intermittent every 24 hours  folic acid 1 milliGRAM(s) Oral daily  glucagon  Injectable 1 milliGRAM(s) IntraMuscular once  heparin   Injectable 5000 Unit(s) SubCutaneous every 12 hours  insulin glargine Injectable (LANTUS) 10 Unit(s) SubCutaneous at bedtime  insulin lispro (ADMELOG) corrective regimen sliding scale   SubCutaneous three times a day before meals  levETIRAcetam  Solution 500 milliGRAM(s) Oral two times a day  midodrine. 10 milliGRAM(s) Oral three times a day  multivitamin 1 Tablet(s) Oral daily  pantoprazole    Tablet 40 milliGRAM(s) Oral before breakfast  silver sulfADIAZINE 1% Cream 1 Application(s) Topical every 12 hours  sodium bicarbonate 1300 milliGRAM(s) Oral every 8 hours  sodium chloride 0.9% Bolus. 100 milliLiter(s) IV Bolus every 5 minutes PRN  zinc sulfate 220 milliGRAM(s) Oral daily    ===========================================================     DAILY PROGRESS NOTE  ===========================================================    Patient Information:  ALICIA BARBOUR  /  63y  /  Male  /  MRN#: [id number]    Patient is a 63y old Male who presents with a chief complaint of Abnormal labs (05 Jun 2023 14:02)      Hospital Day: 7d     |:::::::::::::::::::::::::::| SUBJECTIVE |:::::::::::::::::::::::::::|    OVERNIGHT EVENTS: none.  TODAY: Patient was seen today at bedside. Review of systems is otherwise negative.    |:::::::::::::::::::::::::::| OBJECTIVE |:::::::::::::::::::::::::::|    VITAL SIGNS: Last 24 Hours  T(C): 36.6 (06 Jun 2023 05:11), Max: 37 (05 Jun 2023 20:30)  T(F): 97.8 (06 Jun 2023 05:11), Max: 98.6 (05 Jun 2023 20:30)  HR: 76 (06 Jun 2023 05:11) (67 - 76)  BP: 98/56 (06 Jun 2023 05:11) (98/56 - 159/75)  BP(mean): --  RR: 16 (06 Jun 2023 05:11) (16 - 20)  SpO2: 100% (06 Jun 2023 05:11) (99% - 100%)    06-05-23 @ 07:01  -  06-06-23 @ 07:00  --------------------------------------------------------  IN: 870 mL / OUT: 1000 mL / NET: -130 mL      PHYSICAL EXAM:  GENERAL: Arousable, trached/vented, peg tube, teixeira  HEENT: Head NC/AT  CARDIO: Regular rate; Regular rhythm; S1 & S2.  RESP: No rales, wheezing, or rhonchi appreciated.  GI: Soft; NT/ND; BS  EXT: No edema.   SKIN: r/ heel, buttock, upper back stage 2, unstageable sacral ulcer    LAB RESULTS:                        8.7    13.61 )-----------( 327      ( 05 Jun 2023 06:07 )             27.0     06-05    127<L>  |  90<L>  |  148<HH>  ----------------------------<  191<H>  4.0   |  21  |  3.3<H>    Ca    8.2<L>      05 Jun 2023 06:07  Phos  2.2     06-05  Mg     2.6     06-05    TPro  5.2<L>  /  Alb  1.9<L>  /  TBili  0.4  /  DBili  x   /  AST  16  /  ALT  <5  /  AlkPhos  169<H>  06-05                MICROBIOLOGY:    RADIOLOGY:    ALLERGIES:  penicillin (Other)    MEDICATIONS:  acetaminophen     Tablet .. 650 milliGRAM(s) Oral every 6 hours PRN  albuterol/ipratropium for Nebulization 3 milliLiter(s) Nebulizer every 6 hours  aMIOdarone    Tablet   Oral   aMIOdarone    Tablet 200 milliGRAM(s) Oral daily  ascorbic acid 500 milliGRAM(s) Oral daily  aspirin  chewable 81 milliGRAM(s) Oral daily  atorvastatin 80 milliGRAM(s) Oral at bedtime  buMETAnide Injectable 2 milliGRAM(s) IV Push every 12 hours  chlorhexidine 0.12% Liquid 15 milliLiter(s) Oral Mucosa two times a day  chlorhexidine 2% Cloths 1 Application(s) Topical <User Schedule>  dextrose 5%. 1000 milliLiter(s) IV Continuous <Continuous>  dextrose 5%. 1000 milliLiter(s) IV Continuous <Continuous>  dextrose 50% Injectable 25 Gram(s) IV Push once  dextrose 50% Injectable 25 Gram(s) IV Push once  dextrose 50% Injectable 12.5 Gram(s) IV Push once  dextrose Oral Gel 15 Gram(s) Oral once PRN  ferrous    sulfate Liquid 300 milliGRAM(s) Enteral Tube daily  fluconAZOLE IVPB 200 milliGRAM(s) IV Intermittent every 24 hours  folic acid 1 milliGRAM(s) Oral daily  glucagon  Injectable 1 milliGRAM(s) IntraMuscular once  heparin   Injectable 5000 Unit(s) SubCutaneous every 12 hours  insulin glargine Injectable (LANTUS) 10 Unit(s) SubCutaneous at bedtime  insulin lispro (ADMELOG) corrective regimen sliding scale   SubCutaneous three times a day before meals  levETIRAcetam  Solution 500 milliGRAM(s) Oral two times a day  midodrine. 10 milliGRAM(s) Oral three times a day  multivitamin 1 Tablet(s) Oral daily  pantoprazole    Tablet 40 milliGRAM(s) Oral before breakfast  silver sulfADIAZINE 1% Cream 1 Application(s) Topical every 12 hours  sodium bicarbonate 1300 milliGRAM(s) Oral every 8 hours  sodium chloride 0.9% Bolus. 100 milliLiter(s) IV Bolus every 5 minutes PRN  zinc sulfate 220 milliGRAM(s) Oral daily    ===========================================================     DAILY PROGRESS NOTE  ===========================================================    Patient Information:  ALICIA BARBOUR  /  63y  /  Male  /  MRN#: [id number]    Patient is a 63y old Male who presents with a chief complaint of Abnormal labs (05 Jun 2023 14:02)      Hospital Day: 7d     |:::::::::::::::::::::::::::| SUBJECTIVE |:::::::::::::::::::::::::::|    OVERNIGHT EVENTS: none.  TODAY: Patient was seen today at bedside. Review of systems is otherwise negative.    |:::::::::::::::::::::::::::| OBJECTIVE |:::::::::::::::::::::::::::|    VITAL SIGNS: Last 24 Hours  T(C): 36.6 (06 Jun 2023 05:11), Max: 37 (05 Jun 2023 20:30)  T(F): 97.8 (06 Jun 2023 05:11), Max: 98.6 (05 Jun 2023 20:30)  HR: 76 (06 Jun 2023 05:11) (67 - 76)  BP: 98/56 (06 Jun 2023 05:11) (98/56 - 159/75)  BP(mean): --  RR: 16 (06 Jun 2023 05:11) (16 - 20)  SpO2: 100% (06 Jun 2023 05:11) (99% - 100%)    06-05-23 @ 07:01  -  06-06-23 @ 07:00  --------------------------------------------------------  IN: 870 mL / OUT: 1000 mL / NET: -130 mL      PHYSICAL EXAM:  GENERAL: Arousable, trached/vented, peg tube, teixeira  HEENT: Head NC/AT  CARDIO: Regular rate; Regular rhythm; S1 & S2.  RESP: No rales, wheezing, or rhonchi appreciated.  GI: Soft; NT/ND; BS  EXT: No edema.   SKIN: r/ heel, buttock, upper back stage 2, unstageable sacral ulcer    LAB RESULTS:                        8.7    13.61 )-----------( 327      ( 05 Jun 2023 06:07 )             27.0     06-05    127<L>  |  90<L>  |  148<HH>  ----------------------------<  191<H>  4.0   |  21  |  3.3<H>    Ca    8.2<L>      05 Jun 2023 06:07  Phos  2.2     06-05  Mg     2.6     06-05    TPro  5.2<L>  /  Alb  1.9<L>  /  TBili  0.4  /  DBili  x   /  AST  16  /  ALT  <5  /  AlkPhos  169<H>  06-05    ALLERGIES:  penicillin (Other)    MEDICATIONS:  acetaminophen     Tablet .. 650 milliGRAM(s) Oral every 6 hours PRN  albuterol/ipratropium for Nebulization 3 milliLiter(s) Nebulizer every 6 hours  aMIOdarone    Tablet   Oral   aMIOdarone    Tablet 200 milliGRAM(s) Oral daily  ascorbic acid 500 milliGRAM(s) Oral daily  aspirin  chewable 81 milliGRAM(s) Oral daily  atorvastatin 80 milliGRAM(s) Oral at bedtime  buMETAnide Injectable 2 milliGRAM(s) IV Push every 12 hours  chlorhexidine 0.12% Liquid 15 milliLiter(s) Oral Mucosa two times a day  chlorhexidine 2% Cloths 1 Application(s) Topical <User Schedule>  dextrose 5%. 1000 milliLiter(s) IV Continuous <Continuous>  dextrose 5%. 1000 milliLiter(s) IV Continuous <Continuous>  dextrose 50% Injectable 25 Gram(s) IV Push once  dextrose 50% Injectable 25 Gram(s) IV Push once  dextrose 50% Injectable 12.5 Gram(s) IV Push once  dextrose Oral Gel 15 Gram(s) Oral once PRN  ferrous    sulfate Liquid 300 milliGRAM(s) Enteral Tube daily  fluconAZOLE IVPB 200 milliGRAM(s) IV Intermittent every 24 hours  folic acid 1 milliGRAM(s) Oral daily  glucagon  Injectable 1 milliGRAM(s) IntraMuscular once  heparin   Injectable 5000 Unit(s) SubCutaneous every 12 hours  insulin glargine Injectable (LANTUS) 10 Unit(s) SubCutaneous at bedtime  insulin lispro (ADMELOG) corrective regimen sliding scale   SubCutaneous three times a day before meals  levETIRAcetam  Solution 500 milliGRAM(s) Oral two times a day  midodrine. 10 milliGRAM(s) Oral three times a day  multivitamin 1 Tablet(s) Oral daily  pantoprazole    Tablet 40 milliGRAM(s) Oral before breakfast  silver sulfADIAZINE 1% Cream 1 Application(s) Topical every 12 hours  sodium bicarbonate 1300 milliGRAM(s) Oral every 8 hours  sodium chloride 0.9% Bolus. 100 milliLiter(s) IV Bolus every 5 minutes PRN  zinc sulfate 220 milliGRAM(s) Oral daily    ===========================================================

## 2023-06-07 NOTE — PROGRESS NOTE ADULT - SUBJECTIVE AND OBJECTIVE BOX
Patient is a 63y old  Male who presents with a chief complaint of Abnormal labs (06 Jun 2023 17:14)        Over Night Events:  Remains critically ill on MV.  Off pressors.          ROS:     All ROS are negative except HPI         PHYSICAL EXAM    ICU Vital Signs Last 24 Hrs  T(C): 37.6 (07 Jun 2023 04:47), Max: 38.1 (06 Jun 2023 20:30)  T(F): 99.6 (07 Jun 2023 04:47), Max: 100.6 (06 Jun 2023 20:30)  HR: 76 (07 Jun 2023 04:47) (67 - 84)  BP: 107/60 (07 Jun 2023 04:47) (104/57 - 118/65)  BP(mean): --  ABP: --  ABP(mean): --  RR: 20 (07 Jun 2023 04:47) (18 - 20)  SpO2: 100% (07 Jun 2023 04:47) (99% - 100%)    O2 Parameters below as of 07 Jun 2023 04:47  Patient On (Oxygen Delivery Method): ventilator            CONSTITUTIONAL:  Ill appearing in  NAD    ENT:   Airway patent,   Mouth with normal mucosa.   Trach     EYES:   Pupils equal,   Round and reactive to light.    CARDIAC:   Normal rate,   Regular rhythm.        RESPIRATORY:   No wheezing  Bilateral BS  Normal chest expansion  Not tachypneic,  No use of accessory muscles    GASTROINTESTINAL:  Abdomen soft,   Non-tender,   No guarding,   + BS    MUSCULOSKELETAL:   Range of motion is not limited,  No clubbing, cyanosis    NEUROLOGICAL:   Opens eyes  Does not follow commands     SKIN:   Skin normal color for race,   No evidence of rash.        06-06-23 @ 07:01  -  06-07-23 @ 07:00  --------------------------------------------------------  IN:    Enteral Tube Flush: 240 mL    Peptamen A.F.: 1500 mL  Total IN: 1740 mL    OUT:    Indwelling Catheter - Urethral (mL): 900 mL  Total OUT: 900 mL    Total NET: 840 mL          LABS:                            7.8    9.81  )-----------( 325      ( 06 Jun 2023 08:18 )             24.6                                               06-06    131<L>  |  96<L>  |  95<HH>  ----------------------------<  205<H>  4.5   |  21  |  2.5<H>    Creatinine Trend  BUN 95, Cr 2.5, (06-06-23 @ 16:54)  Creatinine Trend  BUN 87, Cr 2.5, (06-06-23 @ 08:18)  Creatinine Trend  , Cr 3.3, (06-05-23 @ 06:07)  Creatinine Trend  , Cr 3.1, (06-04-23 @ 06:57)  Creatinine Trend  , Cr 3.0, (06-03-23 @ 06:50)  Creatinine Trend  , Cr 2.8, (06-02-23 @ 19:45)      Ca    7.8<L>      06 Jun 2023 16:54  Mg     2.3     06-06    TPro  5.0<L>  /  Alb  1.6<L>  /  TBili  0.4  /  DBili  x   /  AST  48<H>  /  ALT  7   /  AlkPhos  165<H>  06-06                                                                                           LIVER FUNCTIONS - ( 06 Jun 2023 16:54 )  Alb: x     / Pro: x     / ALK PHOS: x     / ALT: x     / AST: x     / GGT: 86 U/L                                                                                           Mode: AC/ CMV (Assist Control/ Continuous Mandatory Ventilation)  RR (machine): 14  TV (machine): 450  FiO2: 40  PEEP: 8  ITime: 0.9  MAP: 12  PIP: 23                                          MEDICATIONS  (STANDING):  albuterol/ipratropium for Nebulization 3 milliLiter(s) Nebulizer every 6 hours  aMIOdarone    Tablet 200 milliGRAM(s) Oral daily  aMIOdarone    Tablet   Oral   ascorbic acid 500 milliGRAM(s) Oral daily  aspirin  chewable 81 milliGRAM(s) Oral daily  atorvastatin 80 milliGRAM(s) Oral at bedtime  buMETAnide Injectable 2 milliGRAM(s) IV Push every 12 hours  chlorhexidine 0.12% Liquid 15 milliLiter(s) Oral Mucosa two times a day  chlorhexidine 2% Cloths 1 Application(s) Topical <User Schedule>  dextrose 5%. 1000 milliLiter(s) (100 mL/Hr) IV Continuous <Continuous>  dextrose 5%. 1000 milliLiter(s) (50 mL/Hr) IV Continuous <Continuous>  dextrose 50% Injectable 25 Gram(s) IV Push once  dextrose 50% Injectable 25 Gram(s) IV Push once  dextrose 50% Injectable 12.5 Gram(s) IV Push once  ferrous    sulfate Liquid 300 milliGRAM(s) Enteral Tube daily  fluconAZOLE   40 mG/mL Suspension 200 milliGRAM(s) Enteral Tube every 24 hours  folic acid 1 milliGRAM(s) Oral daily  glucagon  Injectable 1 milliGRAM(s) IntraMuscular once  heparin   Injectable 5000 Unit(s) SubCutaneous every 12 hours  insulin glargine Injectable (LANTUS) 10 Unit(s) SubCutaneous at bedtime  insulin lispro (ADMELOG) corrective regimen sliding scale   SubCutaneous three times a day before meals  levETIRAcetam  Solution 500 milliGRAM(s) Oral two times a day  midodrine. 10 milliGRAM(s) Oral three times a day  multivitamin 1 Tablet(s) Oral daily  pantoprazole    Tablet 40 milliGRAM(s) Oral before breakfast  silver sulfADIAZINE 1% Cream 1 Application(s) Topical every 12 hours  sodium bicarbonate 1300 milliGRAM(s) Oral every 8 hours  zinc sulfate 220 milliGRAM(s) Oral daily    MEDICATIONS  (PRN):  acetaminophen     Tablet .. 650 milliGRAM(s) Oral every 6 hours PRN Temp greater or equal to 38C (100.4F), Mild Pain (1 - 3)  dextrose Oral Gel 15 Gram(s) Oral once PRN Blood Glucose LESS THAN 70 milliGRAM(s)/deciliter  sodium chloride 0.9% Bolus. 100 milliLiter(s) IV Bolus every 5 minutes PRN SBP LESS THAN or EQUAL to 80 mmHg      New X-rays reviewed:                                                                                  ECHO

## 2023-06-07 NOTE — CHART NOTE - NSCHARTNOTEFT_GEN_A_CORE
Patient seen and evaluated by palliative care. Goals of care clear with plan of care in place.    Signing off    Reconsult as needed

## 2023-06-07 NOTE — PROGRESS NOTE ADULT - SUBJECTIVE AND OBJECTIVE BOX
seen and examined  24 h events noted   trach/vent         PAST HISTORY  --------------------------------------------------------------------------------  No significant changes to PMH, PSH, FHx, SHx, unless otherwise noted    ALLERGIES & MEDICATIONS  --------------------------------------------------------------------------------  Allergies    penicillin (Other)    Intolerances      Standing Inpatient Medications  albuterol/ipratropium for Nebulization 3 milliLiter(s) Nebulizer every 6 hours  aMIOdarone    Tablet   Oral   aMIOdarone    Tablet 200 milliGRAM(s) Oral daily  ascorbic acid 500 milliGRAM(s) Oral daily  aspirin  chewable 81 milliGRAM(s) Oral daily  atorvastatin 80 milliGRAM(s) Oral at bedtime  buMETAnide Injectable 2 milliGRAM(s) IV Push every 12 hours  chlorhexidine 0.12% Liquid 15 milliLiter(s) Oral Mucosa two times a day  chlorhexidine 2% Cloths 1 Application(s) Topical <User Schedule>  dextrose 5%. 1000 milliLiter(s) IV Continuous <Continuous>  dextrose 5%. 1000 milliLiter(s) IV Continuous <Continuous>  dextrose 50% Injectable 12.5 Gram(s) IV Push once  dextrose 50% Injectable 25 Gram(s) IV Push once  dextrose 50% Injectable 25 Gram(s) IV Push once  ferrous    sulfate Liquid 300 milliGRAM(s) Enteral Tube daily  fluconAZOLE   40 mG/mL Suspension 200 milliGRAM(s) Enteral Tube every 24 hours  folic acid 1 milliGRAM(s) Oral daily  glucagon  Injectable 1 milliGRAM(s) IntraMuscular once  heparin   Injectable 5000 Unit(s) SubCutaneous every 12 hours  insulin glargine Injectable (LANTUS) 10 Unit(s) SubCutaneous at bedtime  insulin lispro (ADMELOG) corrective regimen sliding scale   SubCutaneous three times a day before meals  levETIRAcetam  Solution 500 milliGRAM(s) Oral two times a day  midodrine. 10 milliGRAM(s) Oral three times a day  multivitamin 1 Tablet(s) Oral daily  pantoprazole    Tablet 40 milliGRAM(s) Oral before breakfast  silver sulfADIAZINE 1% Cream 1 Application(s) Topical every 12 hours  sodium bicarbonate 1300 milliGRAM(s) Oral every 8 hours  zinc sulfate 220 milliGRAM(s) Oral daily    PRN Inpatient Medications  acetaminophen     Tablet .. 650 milliGRAM(s) Oral every 6 hours PRN  dextrose Oral Gel 15 Gram(s) Oral once PRN  sodium chloride 0.9% Bolus. 100 milliLiter(s) IV Bolus every 5 minutes PRN            VITALS/PHYSICAL EXAM  --------------------------------------------------------------------------------  T(C): 37.6 (06-07-23 @ 04:47), Max: 38.1 (06-06-23 @ 20:30)  HR: 76 (06-07-23 @ 04:47) (67 - 84)  BP: 107/60 (06-07-23 @ 04:47) (104/57 - 118/65)  RR: 20 (06-07-23 @ 04:47) (18 - 20)  SpO2: 100% (06-07-23 @ 04:47) (99% - 100%)  Wt(kg): --    Weight (kg): 93.3 (06-05-23 @ 17:10)      06-06-23 @ 07:01  -  06-07-23 @ 07:00  --------------------------------------------------------  IN: 1740 mL / OUT: 900 mL / NET: 840 mL      Physical Exam:  	Gen: trach/vent   	Pulm: B/L aline   	CV:  S1S2; no rub  	Abd: +distended  	LE: edema  	Vascular access:udall     LABS/STUDIES  --------------------------------------------------------------------------------              7.5    10.10 >-----------<  294      [06-07-23 @ 06:24]              23.7     131  |  96  |  95  ----------------------------<  205      [06-06-23 @ 16:54]  4.5   |  21  |  2.5        Ca     7.8     [06-06-23 @ 16:54]      Mg     2.3     [06-06-23 @ 08:18]    TPro  5.0  /  Alb  1.6  /  TBili  0.4  /  DBili  x   /  AST  48  /  ALT  7   /  AlkPhos  165  [06-06-23 @ 08:18]      Creatinine Trend:  SCr 2.5 [06-06 @ 16:54]  SCr 2.5 [06-06 @ 08:18]  SCr 3.3 [06-05 @ 06:07]  SCr 3.1 [06-04 @ 06:57]  SCr 3.0 [06-03 @ 06:50]    Urinalysis - [05-30-23 @ 21:15]      Color Jazmine / Appearance Turbid / SG 1.023 / pH 5.5      Gluc Negative / Ketone Trace  / Bili Negative / Urobili 6 mg/dL       Blood Moderate / Protein 100 mg/dL / Leuk Est Large / Nitrite Negative       /  / Hyaline 15 / Gran  / Sq Epi  / Non Sq Epi  / Bacteria Negative      Iron 51, TIBC 111, %sat 46      [06-06-23 @ 10:40]  Ferritin 1956      [06-06-23 @ 10:40]  Vitamin D (25OH) 70      [06-03-23 @ 06:50]    HBsAb <3.0      [06-01-23 @ 22:50]  HBsAb Nonreact      [06-01-23 @ 22:50]  HBsAg Nonreact      [06-01-23 @ 22:50]  HBcAb Nonreact      [06-01-23 @ 22:50]

## 2023-06-07 NOTE — PROGRESS NOTE ADULT - SUBJECTIVE AND OBJECTIVE BOX
DAILY PROGRESS NOTE  ===========================================================    Patient Information:  ALICIA BARBOUR  /  63y  /  Male  /  MRN#: [id number]    Patient is a 63y old Male who presents with a chief complaint of Abnormal labs (07 Jun 2023 07:25)      Hospital Day: 8d     |:::::::::::::::::::::::::::| SUBJECTIVE |:::::::::::::::::::::::::::|    OVERNIGHT EVENTS: none.  TODAY: Patient was seen today at bedside. Review of systems is otherwise negative.    |:::::::::::::::::::::::::::| OBJECTIVE |:::::::::::::::::::::::::::|    VITAL SIGNS: Last 24 Hours  T(C): 37.6 (07 Jun 2023 04:47), Max: 38.1 (06 Jun 2023 20:30)  T(F): 99.6 (07 Jun 2023 04:47), Max: 100.6 (06 Jun 2023 20:30)  HR: 76 (07 Jun 2023 04:47) (67 - 84)  BP: 107/60 (07 Jun 2023 04:47) (104/57 - 118/65)  BP(mean): --  RR: 20 (07 Jun 2023 04:47) (18 - 20)  SpO2: 100% (07 Jun 2023 04:47) (99% - 100%)    06-06-23 @ 07:01  -  06-07-23 @ 07:00  --------------------------------------------------------  IN: 1740 mL / OUT: 900 mL / NET: 840 mL      PHYSICAL EXAM:  GENERAL: Arousable but lethargic, trached and vented, peg, anasarca  HEENT: Head NC/AT  CARDIO: Regular rate; Regular rhythm; S1 & S2.  RESP: No rales, wheezing, or rhonchi appreciated.  GI: Soft; NT/ND; BS  EXT: edematous   SKIN: r/ heel, buttock, upper back stage 2, unstageable sacral ulcer dressed    LAB RESULTS:                        7.5    10.10 )-----------( 294      ( 07 Jun 2023 06:24 )             23.7     06-06    131<L>  |  96<L>  |  95<HH>  ----------------------------<  205<H>  4.5   |  21  |  2.5<H>    Ca    7.8<L>      06 Jun 2023 16:54  Mg     2.3     06-06    TPro  5.0<L>  /  Alb  1.6<L>  /  TBili  0.4  /  DBili  x   /  AST  48<H>  /  ALT  7   /  AlkPhos  165<H>  06-06                MICROBIOLOGY:    RADIOLOGY:    ALLERGIES:  penicillin (Other)    MEDICATIONS:  acetaminophen     Tablet .. 650 milliGRAM(s) Oral every 6 hours PRN  albuterol/ipratropium for Nebulization 3 milliLiter(s) Nebulizer every 6 hours  aMIOdarone    Tablet 200 milliGRAM(s) Oral daily  aMIOdarone    Tablet   Oral   ascorbic acid 500 milliGRAM(s) Oral daily  aspirin  chewable 81 milliGRAM(s) Oral daily  atorvastatin 80 milliGRAM(s) Oral at bedtime  buMETAnide Injectable 2 milliGRAM(s) IV Push every 12 hours  chlorhexidine 0.12% Liquid 15 milliLiter(s) Oral Mucosa two times a day  chlorhexidine 2% Cloths 1 Application(s) Topical <User Schedule>  dextrose 5%. 1000 milliLiter(s) IV Continuous <Continuous>  dextrose 5%. 1000 milliLiter(s) IV Continuous <Continuous>  dextrose 50% Injectable 25 Gram(s) IV Push once  dextrose 50% Injectable 25 Gram(s) IV Push once  dextrose 50% Injectable 12.5 Gram(s) IV Push once  dextrose Oral Gel 15 Gram(s) Oral once PRN  ferrous    sulfate Liquid 300 milliGRAM(s) Enteral Tube daily  fluconAZOLE   40 mG/mL Suspension 200 milliGRAM(s) Enteral Tube every 24 hours  folic acid 1 milliGRAM(s) Oral daily  glucagon  Injectable 1 milliGRAM(s) IntraMuscular once  heparin   Injectable 5000 Unit(s) SubCutaneous every 12 hours  insulin glargine Injectable (LANTUS) 10 Unit(s) SubCutaneous at bedtime  insulin lispro (ADMELOG) corrective regimen sliding scale   SubCutaneous three times a day before meals  levETIRAcetam  Solution 500 milliGRAM(s) Oral two times a day  midodrine. 10 milliGRAM(s) Oral three times a day  multivitamin 1 Tablet(s) Oral daily  pantoprazole    Tablet 40 milliGRAM(s) Oral before breakfast  silver sulfADIAZINE 1% Cream 1 Application(s) Topical every 12 hours  sodium bicarbonate 1300 milliGRAM(s) Oral every 8 hours  sodium chloride 0.9% Bolus. 100 milliLiter(s) IV Bolus every 5 minutes PRN  zinc sulfate 220 milliGRAM(s) Oral daily    ===========================================================

## 2023-06-07 NOTE — PROGRESS NOTE ADULT - SUBJECTIVE AND OBJECTIVE BOX
VIJAYSTEPHONALICIA  63y  Male      Patient is a 63y old  Male who presents with a chief complaint of Abnormal labs       INTERVAL HPI/OVERNIGHT EVENTS:      ******************************* REVIEW OF SYSTEMS:**********************************************    All other review of systems negative    *********************** VITALS ******************************************    T(F): 99.5 (06-07-23 @ 13:00)  HR: 78 (06-07-23 @ 14:40) (74 - 84)  BP: 110/64 (06-07-23 @ 13:00) (104/57 - 114/63)  RR: 22 (06-07-23 @ 13:00) (18 - 22)  SpO2: 99% (06-07-23 @ 14:40) (99% - 100%)    06-06-23 @ 07:01  -  06-07-23 @ 07:00  --------------------------------------------------------  IN: 1740 mL / OUT: 900 mL / NET: 840 mL            06-06-23 @ 07:01  -  06-07-23 @ 07:00  --------------------------------------------------------  IN: 1740 mL / OUT: 900 mL / NET: 840 mL        ******************************** PHYSICAL EXAM:**************************************************  GENERAL: NAD    PSYCH: no agitation, baseline mentation  HEENT: vented thru trach     NERVOUS SYSTEM:  Alert & awake x2   PULMONARY: MICHAEL, CTA    CARDIOVASCULAR: S1S2 RRR    GI: Soft, NT, ND; BS present.    EXTREMITIES:  2+ Peripheral Pulses, No clubbing, cyanosis, or edema    LYMPH: No lymphadenopathy noted    SKIN: No rashes or lesions      **************************** LABS *******************************************************                          7.5    10.10 )-----------( 294      ( 07 Jun 2023 06:24 )             23.7     06-07    131<L>  |  94<L>  |  96<HH>  ----------------------------<  149<H>  4.6   |  24  |  2.7<H>    Ca    8.0<L>      07 Jun 2023 06:24  Mg     2.6     06-07    TPro  5.0<L>  /  Alb  1.8<L>  /  TBili  1.1  /  DBili  x   /  AST  30  /  ALT  <5  /  AlkPhos  161<H>  06-07          Lactate Trend        CAPILLARY BLOOD GLUCOSE      POCT Blood Glucose.: 152 mg/dL (07 Jun 2023 11:33)          **************************Active Medications *******************************************  penicillin (Other)      acetaminophen     Tablet .. 650 milliGRAM(s) Oral every 6 hours PRN  albuterol/ipratropium for Nebulization 3 milliLiter(s) Nebulizer every 6 hours  aMIOdarone    Tablet 200 milliGRAM(s) Oral daily  aMIOdarone    Tablet   Oral   aspirin  chewable 81 milliGRAM(s) Oral daily  atorvastatin 80 milliGRAM(s) Oral at bedtime  buMETAnide Injectable 2 milliGRAM(s) IV Push every 12 hours  chlorhexidine 0.12% Liquid 15 milliLiter(s) Oral Mucosa two times a day  chlorhexidine 2% Cloths 1 Application(s) Topical <User Schedule>  dextrose 5%. 1000 milliLiter(s) IV Continuous <Continuous>  dextrose 5%. 1000 milliLiter(s) IV Continuous <Continuous>  dextrose 50% Injectable 25 Gram(s) IV Push once  dextrose 50% Injectable 25 Gram(s) IV Push once  dextrose 50% Injectable 12.5 Gram(s) IV Push once  dextrose Oral Gel 15 Gram(s) Oral once PRN  fluconAZOLE   40 mG/mL Suspension 200 milliGRAM(s) Enteral Tube every 24 hours  glucagon  Injectable 1 milliGRAM(s) IntraMuscular once  heparin   Injectable 5000 Unit(s) SubCutaneous every 12 hours  insulin glargine Injectable (LANTUS) 10 Unit(s) SubCutaneous at bedtime  insulin lispro (ADMELOG) corrective regimen sliding scale   SubCutaneous three times a day before meals  levETIRAcetam  Solution 500 milliGRAM(s) Oral two times a day  midodrine. 10 milliGRAM(s) Oral three times a day  pantoprazole    Tablet 40 milliGRAM(s) Oral before breakfast  silver sulfADIAZINE 1% Cream 1 Application(s) Topical every 12 hours  sodium bicarbonate 1300 milliGRAM(s) Oral every 8 hours  sodium chloride 0.9% Bolus. 100 milliLiter(s) IV Bolus every 5 minutes PRN      ***************************************************  RADIOLOGY & ADDITIONAL TESTS:    Imaging Personally Reviewed:  [ ] YES  [ ] NO    HEALTH ISSUES - PROBLEM Dx:  Sepsis    Acute UTI    History of intracranial hemorrhage    Palliative care by specialist    Anemia    MICHELLE (acute kidney injury)

## 2023-06-07 NOTE — PROGRESS NOTE ADULT - ASSESSMENT
IMPRESSION:    Sepsis present on admission  Candida UTI  MICHELLE on CKD III on dialysis   NSTEMI likely type 2  Hyponatremia resolved   Paroxysmal Afib  Acute on chronic anemia suspected UGI Bleed  Chronic respiratory failure  H/o ICH s/p trach and PEG  H/o CAD      PLAN:    CNS: Avoid CNS depressants     HEENT: Oral and trach care    PULMONARY: HOB 45. Aspiration, no vent changes, ABG PRN. Goal saturation 92-96%. HOB @ 45 degrees.  Monitor P/P/ DP.  Vent dependents.      CARDIOVASCULAR: Avoid overload    GI: PEG feeds. Bowel regimen     RENAL: trend CMP.  Correct as needed.  Nephrology following     INFECTIOUS DISEASE: ABX PER ID.  <Monitor VS     HEMATOLOGICAL: DVT prophylaxis.  LE duplex negative.  Monitor CBC      ENDOCRINE: Follow up FS. Insulin protocol if needed.    MUSCULOSKELETAL: Bedrest.    DNR    DC Planing

## 2023-06-07 NOTE — PROGRESS NOTE ADULT - ASSESSMENT
64 yo M with  PMH of ICH (2021) s/p trach and PEG, HTN, HLD, T2DM, CAD s/p stents, Recurrent C diff, BIBEMS from [facility] for abnormal labs. Patient non-verbal at baseline - limited history. Per NH chart  have a BUN greater than 200 and creatinine of 4.3 on outpatient labs. Outpatient CXR also w/ new L sided pleural effusion. With EMS patient was also found to be in irregular rhythm, no known history of A-fib or a flutter.  MICHELLE on CKD 3a - severe azotemia  likely due to GIB / hypotension  Started HD on 6/1/23  Acute anemia d/t UGIB  Troponemia  Lactic acidosis  DKA   Afib   - will decide on HD / pending labs   - non oliguric   - continue bumex   - BP noted / continue midodrine   - last  ph noted / no binders   - continue sodium bicarbonate   - very poor prognosis   will follow

## 2023-06-07 NOTE — PROGRESS NOTE ADULT - ASSESSMENT
64 yo M with  PMH of ICH (2021) s/p trach and PEG, HTN, HLD, T2DM, CAD s/p stents, Recurrent C diff, BIBEMS from [facility] for abnormal labs. Patient non-verbal at baseline - limited history. Per NH chart  have a BUN greater than 200 and creatinine of 4.3 on outpatient labs. Outpatient CXR also w/ new L sided pleural effusion. In ED, patient was hypotensive with Afib with RVR, found to be in acure renal failure, anemic with elevated trops. He was started on Amiodarone GTT, PPI GTT, gastric lavage with coffee ground secretions, started on broad spectrum abx and admitted to MICU  While in MICU, found to be in DKA, s/p insulin drip, MICHELLE started on HD, acute anemia s/p PRBC transfusion, NSTEMI with downtrending trops, now downgraded to SDU    Acute Renal Failure, started on HD 6/1  Fluid Overload  HAGMA  - non oliguric, RBUS without hydro   - started on HD 6/1 via R IJ [place]   - Cr improving, f/u Nephro if need further HD  - c/w bumex 2mg iv q12hr, HD per renal--> HD today   - c/w midodrine 10mg q8hr  - c/w sodium bicarb 1300mg PO TID  -  TTE 6/1 - EF 63%, GIDD   - nephrology following     Candida tropicalis UTI  s/p cefepime and vanc   Blood clts neg, URine with Candida tropicalis   on  fluconazole, ID following can change from IV to PO via gtube     Acute anemia  - HgB 6.7 (baseline 8) s/p 2U pRBC - improved to 9.9  - evaluated by GI - no evidence of GI bleed (PEG tube flushed with return of bile and no blood)  - Monitor H/H closely  - Active T+S    Hyponatremia, likely hypervolemic  - c/w bumex 2mg iv q12hr and HD per nephro  - daily BMP    Afib w/ RVR - now rate controlled   - currently rate controlled s/p amio gtt  - change amio to 200 q24H     CAD s/p stents  NSTEMI  - Trops 1.80-->1.63-->1.57-->1.55  - seen by cardio,  c/w asa and statin, medical management     DKA, resolved  - s/p insulin drip, now on basal/bolus and tube feeds. Monitor FS     Hx ICH s/p trach and PEG  Bedbound from NH   - c/w keppra 500mg BID for seizure prophylaxis    DNR only, overall prognosis is poor  Pending: HD per renal, diuresis, labs, iv abx 62 yo M with  PMH of ICH (2021) s/p trach and PEG, HTN, HLD, T2DM, CAD s/p stents, Recurrent C diff, BIBEMS from [facility] for abnormal labs. Patient non-verbal at baseline - limited history. Per NH chart  have a BUN greater than 200 and creatinine of 4.3 on outpatient labs. Outpatient CXR also w/ new L sided pleural effusion. In ED, patient was hypotensive with Afib with RVR, found to be in acure renal failure, anemic with elevated trops. He was started on Amiodarone GTT, PPI GTT, gastric lavage with coffee ground secretions, started on broad spectrum abx and admitted to MICU  While in MICU, found to be in DKA, s/p insulin drip, MICHELLE started on HD, acute anemia s/p PRBC transfusion, NSTEMI with downtrending trops, now downgraded to SDU    Acute Renal Failure, started on HD 6/1  Fluid Overload  HAGMA  - non oliguric, RBUS without hydro   - started on HD 6/1 via R IJ [place]   - Cr improving, f/u Nephro if need further HD  - c/w bumex 2mg iv q12hr, HD per renal--> HD today   - c/w midodrine 10mg q8hr  - c/w sodium bicarb 1300mg PO TID  -  TTE 6/1 - EF 63%, GIDD   - nephrology following     Candida tropicalis UTI  s/p cefepime and vanc   Blood clts neg, URine with Candida tropicalis   on  fluconazole, ID following can change from IV to PO via gtube     Acute anemia  - HgB 6.7 (baseline 8) s/p 2U pRBC - improved to 9.9  - evaluated by GI - no evidence of GI bleed (PEG tube flushed with return of bile and no blood)  - Monitor H/H closely  - Active T+S    Hyponatremia, likely hypervolemic  - c/w bumex 2mg iv q12hr and HD per nephro  - daily BMP    Afib w/ RVR - now rate controlled   - currently rate controlled s/p amio gtt  - change amio to 200 q24H     CAD s/p stents  NSTEMI  - Trops 1.80-->1.63-->1.57-->1.55  - seen by cardio,  c/w asa and statin, medical management     DKA, resolved  - s/p insulin drip, now on basal/bolus and tube feeds. Monitor FS     Hx ICH s/p trach and PEG  Bedbound from NH   - c/w keppra 500mg BID for seizure prophylaxis    DNR only, overall prognosis is poor  Pending: HD per renal, diuresis, labs, dispo planning

## 2023-06-07 NOTE — PROGRESS NOTE ADULT - ASSESSMENT
64 yo M with  PMH of ICH (2021) s/p trach and PEG, HTN, HLD, T2DM, CAD s/p stents, Recurrent C diff, BIBEMS from [facility] for abnormal labs. Patient non-verbal at baseline - limited history. Per NH chart  have a BUN greater than 200 and creatinine of 4.3 on outpatient labs. Outpatient CXR also w/ new L sided pleural effusion. In ED, patient was hypotensive with Afib with RVR, found to be in acure renal failure, anemic with elevated trops. He was started on Amiodarone GTT, PPI GTT, gastric lavage with coffee ground secretions, started on broad spectrum abx and admitted to MICU  While in MICU, found to be in DKA, s/p insulin drip, MICHELLE started on HD, acute anemia s/p PRBC transfusion, NSTEMI with downtrending trops, now downgraded to SDU    Acute Renal Failure, started on HD 6/1  Fluid Overload  HAGMA  - non oliguric, RBUS without hydro   - started on HD 6/1 via R IJ [place]   - Cr improving, f/u Nephro if need further HD  - c/w bumex 2mg iv q12hr, HD per renal--> HD today   - c/w midodrine 10mg q8hr  - c/w sodium bicarb 1300mg PO TID  -  TTE 6/1 - EF 63%, GIDD   - nephrology following     Candida tropicalis UTI  s/p cefepime and vanc   Blood clts neg, URine with Candida tropicalis   on  fluconazole, changed from IV to PO via gtube     Acute anemia  - HgB 6.7 (baseline 8) s/p 2U pRBC - improved to 9.9  - evaluated by GI - no evidence of GI bleed (PEG tube flushed with return of bile and no blood)  - Monitor H/H closely  - Active T+S    Hyponatremia, likely hypervolemic  - c/w bumex 2mg iv q12hr and HD per nephro  - daily BMP    Afib w/ RVR - now rate controlled   - currently rate controlled s/p amio gtt  - change amio to 200 q24H     CAD s/p stents  NSTEMI  - Trops 1.80-->1.63-->1.57-->1.55  - seen by cardio,  c/w asa and statin, medical management     DKA, resolved  - s/p insulin drip, now on basal/bolus and tube feeds. Monitor FS     Hx ICH s/p trach and PEG  Bedbound from NH   - c/w keppra 500mg BID for seizure prophylaxis    DNR only, overall prognosis is poor  Pending: HD per renal, dispo planning

## 2023-06-08 NOTE — PROGRESS NOTE ADULT - SUBJECTIVE AND OBJECTIVE BOX
seen and examined  24 h events noted   trach/vent         PAST HISTORY  --------------------------------------------------------------------------------  No significant changes to PMH, PSH, FHx, SHx, unless otherwise noted    ALLERGIES & MEDICATIONS  --------------------------------------------------------------------------------  Allergies    penicillin (Other)    Intolerances      Standing Inpatient Medications  albuterol/ipratropium for Nebulization 3 milliLiter(s) Nebulizer every 6 hours  aMIOdarone    Tablet   Oral   aMIOdarone    Tablet 200 milliGRAM(s) Oral daily  aspirin  chewable 81 milliGRAM(s) Oral daily  atorvastatin 80 milliGRAM(s) Oral at bedtime  buMETAnide Injectable 2 milliGRAM(s) IV Push every 12 hours  chlorhexidine 0.12% Liquid 15 milliLiter(s) Oral Mucosa two times a day  chlorhexidine 2% Cloths 1 Application(s) Topical <User Schedule>  dextrose 5%. 1000 milliLiter(s) IV Continuous <Continuous>  dextrose 5%. 1000 milliLiter(s) IV Continuous <Continuous>  dextrose 50% Injectable 25 Gram(s) IV Push once  dextrose 50% Injectable 25 Gram(s) IV Push once  dextrose 50% Injectable 12.5 Gram(s) IV Push once  fluconAZOLE   40 mG/mL Suspension 200 milliGRAM(s) Enteral Tube every 24 hours  glucagon  Injectable 1 milliGRAM(s) IntraMuscular once  insulin glargine Injectable (LANTUS) 10 Unit(s) SubCutaneous at bedtime  insulin lispro (ADMELOG) corrective regimen sliding scale   SubCutaneous three times a day before meals  levETIRAcetam  Solution 500 milliGRAM(s) Oral two times a day  midodrine. 10 milliGRAM(s) Oral three times a day  pantoprazole    Tablet 40 milliGRAM(s) Oral before breakfast  silver sulfADIAZINE 1% Cream 1 Application(s) Topical every 12 hours  sodium bicarbonate 1300 milliGRAM(s) Oral every 8 hours    PRN Inpatient Medications  acetaminophen     Tablet .. 650 milliGRAM(s) Oral every 6 hours PRN  dextrose Oral Gel 15 Gram(s) Oral once PRN  sodium chloride 0.9% Bolus. 100 milliLiter(s) IV Bolus every 5 minutes PRN          VITALS/PHYSICAL EXAM  --------------------------------------------------------------------------------  T(C): 36.1 (06-08-23 @ 05:24), Max: 37.5 (06-07-23 @ 13:00)  HR: 84 (06-08-23 @ 05:24) (74 - 88)  BP: 118/52 (06-08-23 @ 05:24) (110/64 - 119/52)  RR: 20 (06-08-23 @ 05:24) (19 - 22)  SpO2: 97% (06-08-23 @ 05:24) (97% - 100%)  Wt(kg): --        06-07-23 @ 07:01  -  06-08-23 @ 07:00  --------------------------------------------------------  IN: 750 mL / OUT: 175 mL / NET: 575 mL      Physical Exam:              trach/vent  	Pulm:  B/L aline   	CV:  S1S2; no rub  	Abd: +distended  	Vascular access:terrance     LABS/STUDIES  --------------------------------------------------------------------------------              7.5    10.10 >-----------<  294      [06-07-23 @ 06:24]              23.7     132  |  94  |  100  ----------------------------<  144      [06-07-23 @ 17:57]  4.6   |  27  |  2.7        Ca     8.1     [06-07-23 @ 17:57]      Mg     2.6     [06-07-23 @ 06:24]    TPro  5.0  /  Alb  1.8  /  TBili  1.1  /  DBili  x   /  AST  30  /  ALT  <5  /  AlkPhos  161  [06-07-23 @ 06:24]    Creatinine Trend:  SCr 2.7 [06-07 @ 17:57]  SCr 2.7 [06-07 @ 06:24]  SCr 2.5 [06-06 @ 16:54]  SCr 2.5 [06-06 @ 08:18]  SCr 3.3 [06-05 @ 06:07]    Urinalysis - [05-30-23 @ 21:15]      Color Jazmine / Appearance Turbid / SG 1.023 / pH 5.5      Gluc Negative / Ketone Trace  / Bili Negative / Urobili 6 mg/dL       Blood Moderate / Protein 100 mg/dL / Leuk Est Large / Nitrite Negative       /  / Hyaline 15 / Gran  / Sq Epi  / Non Sq Epi  / Bacteria Negative      Iron 51, TIBC 111, %sat 46      [06-06-23 @ 10:40]  Ferritin 1956      [06-06-23 @ 10:40]  Vitamin D (25OH) 70      [06-03-23 @ 06:50]    HBsAb <3.0      [06-01-23 @ 22:50]  HBsAb Nonreact      [06-01-23 @ 22:50]  HBsAg Nonreact      [06-01-23 @ 22:50]  HBcAb Nonreact      [06-01-23 @ 22:50]

## 2023-06-08 NOTE — PROGRESS NOTE ADULT - SUBJECTIVE AND OBJECTIVE BOX
VIJAYSTEPHONALICIA  63y  Male      Patient is a 63y old  Male who presents with a chief complaint of Abnormal labs      INTERVAL HPI/OVERNIGHT EVENTS:      ******************************* REVIEW OF SYSTEMS:**********************************************  All other review of systems negative    *********************** VITALS ******************************************    T(F): 98.9 (06-08-23 @ 13:55)  HR: 85 (06-08-23 @ 15:02) (82 - 88)  BP: 120/54 (06-08-23 @ 13:55) (118/52 - 120/54)  RR: 20 (06-08-23 @ 13:55) (19 - 20)  SpO2: 100% (06-08-23 @ 15:02) (97% - 100%)    06-07-23 @ 07:01  -  06-08-23 @ 07:00  --------------------------------------------------------  IN: 750 mL / OUT: 175 mL / NET: 575 mL            06-07-23 @ 07:01  -  06-08-23 @ 07:00  --------------------------------------------------------  IN: 750 mL / OUT: 175 mL / NET: 575 mL        ******************************** PHYSICAL EXAM:**************************************************  GENERAL: NAD    PSYCH: no agitation, baseline mentation  HEENT: vented thru trach     NERVOUS SYSTEM:  Alert & awake x 2   PULMONARY: MICHAEL, CTA    CARDIOVASCULAR: S1S2 RRR    GI: Soft, NT, ND; BS present.    EXTREMITIES:  2+ Peripheral Pulses, No clubbing, cyanosis, or edema    LYMPH: No lymphadenopathy noted    SKIN: No rashes or lesions      **************************** LABS *******************************************************                          6.6    10.50 )-----------( 299      ( 08 Jun 2023 10:50 )             21.6     06-08    133<L>  |  96<L>  |  107<HH>  ----------------------------<  183<H>  4.9   |  26  |  2.8<H>    Ca    8.0<L>      08 Jun 2023 10:50  Phos  1.7     06-08  Mg     2.6     06-08    TPro  5.0<L>  /  Alb  1.6<L>  /  TBili  0.3  /  DBili  x   /  AST  35  /  ALT  <5  /  AlkPhos  166<H>  06-08          Lactate Trend        CAPILLARY BLOOD GLUCOSE      POCT Blood Glucose.: 214 mg/dL (08 Jun 2023 13:50)          **************************Active Medications *******************************************  penicillin (Other)      acetaminophen     Tablet .. 650 milliGRAM(s) Oral every 6 hours PRN  albuterol/ipratropium for Nebulization 3 milliLiter(s) Nebulizer every 6 hours  aMIOdarone    Tablet 200 milliGRAM(s) Oral daily  aMIOdarone    Tablet   Oral   aspirin  chewable 81 milliGRAM(s) Oral daily  atorvastatin 80 milliGRAM(s) Oral at bedtime  buMETAnide Injectable 2 milliGRAM(s) IV Push every 12 hours  chlorhexidine 0.12% Liquid 15 milliLiter(s) Oral Mucosa two times a day  chlorhexidine 2% Cloths 1 Application(s) Topical <User Schedule>  dextrose 5%. 1000 milliLiter(s) IV Continuous <Continuous>  dextrose 5%. 1000 milliLiter(s) IV Continuous <Continuous>  dextrose 50% Injectable 25 Gram(s) IV Push once  dextrose 50% Injectable 25 Gram(s) IV Push once  dextrose 50% Injectable 12.5 Gram(s) IV Push once  dextrose Oral Gel 15 Gram(s) Oral once PRN  fluconAZOLE   40 mG/mL Suspension 200 milliGRAM(s) Enteral Tube every 24 hours  glucagon  Injectable 1 milliGRAM(s) IntraMuscular once  insulin glargine Injectable (LANTUS) 10 Unit(s) SubCutaneous at bedtime  insulin lispro (ADMELOG) corrective regimen sliding scale   SubCutaneous three times a day before meals  levETIRAcetam  Solution 500 milliGRAM(s) Oral two times a day  midodrine. 10 milliGRAM(s) Oral three times a day  pantoprazole    Tablet 40 milliGRAM(s) Oral before breakfast  polyethylene glycol 3350 17 Gram(s) Oral daily  potassium phosphate / sodium phosphate Powder (PHOS-NaK) 1 Packet(s) Oral three times a day  senna 2 Tablet(s) Oral at bedtime  silver sulfADIAZINE 1% Cream 1 Application(s) Topical every 12 hours  sodium chloride 0.9% Bolus. 100 milliLiter(s) IV Bolus every 5 minutes PRN      ***************************************************  RADIOLOGY & ADDITIONAL TESTS:    Imaging Personally Reviewed:  [ ] YES  [ ] NO    HEALTH ISSUES - PROBLEM Dx:  Sepsis    Acute UTI    History of intracranial hemorrhage    Palliative care by specialist    Anemia    MICHELLE (acute kidney injury)

## 2023-06-08 NOTE — PROGRESS NOTE ADULT - SUBJECTIVE AND OBJECTIVE BOX
DAILY PROGRESS NOTE  ===========================================================    Patient Information:  ALICIA BARBOUR  /  63y  /  Male  /  MRN#: [id number]    Patient is a 63y old Male who presents with a chief complaint of Abnormal labs (08 Jun 2023 07:15)      Hospital Day: 9d     |:::::::::::::::::::::::::::| SUBJECTIVE |:::::::::::::::::::::::::::|    OVERNIGHT EVENTS: pt with bloody secretions, now resolved, hep held  TODAY: Patient was seen today at bedside. Review of systems is otherwise negative.    |:::::::::::::::::::::::::::| OBJECTIVE |:::::::::::::::::::::::::::|    VITAL SIGNS: Last 24 Hours  T(C): 36.1 (08 Jun 2023 05:24), Max: 37.5 (07 Jun 2023 13:00)  T(F): 97 (08 Jun 2023 05:24), Max: 99.5 (07 Jun 2023 13:00)  HR: 84 (08 Jun 2023 05:24) (74 - 88)  BP: 118/52 (08 Jun 2023 05:24) (110/64 - 119/52)  BP(mean): --  RR: 20 (08 Jun 2023 05:24) (19 - 22)  SpO2: 97% (08 Jun 2023 05:24) (97% - 100%)    06-07-23 @ 07:01  -  06-08-23 @ 07:00  --------------------------------------------------------  IN: 750 mL / OUT: 175 mL / NET: 575 mL      PHYSICAL EXAM:  GENERAL: Arousable but lethargic, trached and vented, peg, anasarca  HEENT: Head NC/AT  CARDIO: Regular rate; Regular rhythm; S1 & S2.  RESP: No rales, wheezing, or rhonchi appreciated.  GI: Soft; NT/ND; BS  EXT: edematous   SKIN: r/ heel, buttock, upper back stage 2, unstageable sacral ulcer dressed      LAB RESULTS:                        7.5    10.10 )-----------( 294      ( 07 Jun 2023 06:24 )             23.7     06-07    132<L>  |  94<L>  |  100<HH>  ----------------------------<  144<H>  4.6   |  27  |  2.7<H>    Ca    8.1<L>      07 Jun 2023 17:57  Mg     2.6     06-07    TPro  5.0<L>  /  Alb  1.8<L>  /  TBili  1.1  /  DBili  x   /  AST  30  /  ALT  <5  /  AlkPhos  161<H>  06-07                MICROBIOLOGY:    RADIOLOGY:    ALLERGIES:  penicillin (Other)    MEDICATIONS:  acetaminophen     Tablet .. 650 milliGRAM(s) Oral every 6 hours PRN  albuterol/ipratropium for Nebulization 3 milliLiter(s) Nebulizer every 6 hours  aMIOdarone    Tablet 200 milliGRAM(s) Oral daily  aMIOdarone    Tablet   Oral   aspirin  chewable 81 milliGRAM(s) Oral daily  atorvastatin 80 milliGRAM(s) Oral at bedtime  buMETAnide Injectable 2 milliGRAM(s) IV Push every 12 hours  chlorhexidine 0.12% Liquid 15 milliLiter(s) Oral Mucosa two times a day  chlorhexidine 2% Cloths 1 Application(s) Topical <User Schedule>  dextrose 5%. 1000 milliLiter(s) IV Continuous <Continuous>  dextrose 5%. 1000 milliLiter(s) IV Continuous <Continuous>  dextrose 50% Injectable 25 Gram(s) IV Push once  dextrose 50% Injectable 25 Gram(s) IV Push once  dextrose 50% Injectable 12.5 Gram(s) IV Push once  dextrose Oral Gel 15 Gram(s) Oral once PRN  fluconAZOLE   40 mG/mL Suspension 200 milliGRAM(s) Enteral Tube every 24 hours  glucagon  Injectable 1 milliGRAM(s) IntraMuscular once  insulin glargine Injectable (LANTUS) 10 Unit(s) SubCutaneous at bedtime  insulin lispro (ADMELOG) corrective regimen sliding scale   SubCutaneous three times a day before meals  levETIRAcetam  Solution 500 milliGRAM(s) Oral two times a day  midodrine. 10 milliGRAM(s) Oral three times a day  pantoprazole    Tablet 40 milliGRAM(s) Oral before breakfast  silver sulfADIAZINE 1% Cream 1 Application(s) Topical every 12 hours  sodium bicarbonate 1300 milliGRAM(s) Oral every 8 hours  sodium chloride 0.9% Bolus. 100 milliLiter(s) IV Bolus every 5 minutes PRN    ===========================================================

## 2023-06-08 NOTE — PROGRESS NOTE ADULT - ASSESSMENT
64 yo M with  PMH of ICH (2021) s/p trach and PEG, HTN, HLD, T2DM, CAD s/p stents, Recurrent C diff, BIBEMS from [facility] for abnormal labs. Patient non-verbal at baseline - limited history. Per NH chart  have a BUN greater than 200 and creatinine of 4.3 on outpatient labs. Outpatient CXR also w/ new L sided pleural effusion. In ED, patient was hypotensive with Afib with RVR, found to be in acure renal failure, anemic with elevated trops. He was started on Amiodarone GTT, PPI GTT, gastric lavage with coffee ground secretions, started on broad spectrum abx and admitted to MICU  While in MICU, found to be in DKA, s/p insulin drip, MICHELLE started on HD, acute anemia s/p PRBC transfusion, NSTEMI with downtrending trops, now downgraded to SDU    Acute Renal Failure, started on HD 6/1  Fluid Overload  HAGMA  - non oliguric, RBUS without hydro   - started on HD 6/1 via R IJ [place]   - Cr stable, f/u Nephro if need further HD  - c/w bumex 2mg iv q12hr, HD per renal--> HD today   - c/w midodrine 10mg q8hr  - c/w sodium bicarb 1300mg PO TID  -  TTE 6/1 - EF 63%, GIDD     Candida tropicalis UTI  s/p cefepime and vanc   Blood clts neg, URine with Candida tropicalis   on  fluconazole, changed from IV to PO via gtube     Acute anemia  - HgB 6.7 (baseline 8) s/p 2U pRBC - improved to 9.9  - evaluated by GI - no evidence of GI bleed (PEG tube flushed with return of bile and no blood)  - Monitor H/H closely  - Active T+S    Hyponatremia, likely hypervolemic  - c/w bumex 2mg iv q12hr and HD per nephro  - daily BMP    Afib w/ RVR - now rate controlled   - currently rate controlled s/p amio gtt  - change amio to 200 q24H     CAD s/p stents  NSTEMI  - Trops 1.80-->1.63-->1.57-->1.55  - seen by cardio,  c/w asa and statin, medical management     DKA, resolved  - s/p insulin drip, now on basal/bolus and tube feeds. Monitor FS     Hx ICH s/p trach and PEG  Bedbound from NH   - c/w keppra 500mg BID for seizure prophylaxis    DNR only, overall prognosis is poor  Pending: HD per renal, dispo planning

## 2023-06-08 NOTE — PROGRESS NOTE ADULT - ASSESSMENT
IMPRESSION:    Sepsis present on admission  Candida UTI  MICHELLE on CKD III on dialysis   NSTEMI likely type 2  Hyponatremia resolved   Paroxysmal Afib  Acute on chronic anemia suspected UGI Bleed  Chronic respiratory failure  H/o ICH s/p trach and PEG  H/o CAD      PLAN:    CNS: Avoid CNS depressants     HEENT: Oral and trach care    PULMONARY: HOB 45. Aspiration.  No vent changes.  Goal saturation 92-96%. Vent dependents.  Pulmonary toilet      CARDIOVASCULAR: Avoid overload.  Continue rate control     GI: PEG feeds. Bowel regimen     RENAL: trend CMP.  Correct as needed.  Nephrology following     INFECTIOUS DISEASE: Finish ABX course per ID.  Monitor VS     HEMATOLOGICAL: DVT prophylaxis.  LE duplex negative.  Monitor CBC      ENDOCRINE: Follow up FS. Insulin protocol if needed.    MUSCULOSKELETAL: Bedrest.    DNR    DC Planing

## 2023-06-08 NOTE — PROGRESS NOTE ADULT - ASSESSMENT
64 yo M with  PMH of ICH (2021) s/p trach and PEG, HTN, HLD, T2DM, CAD s/p stents, Recurrent C diff, BIBEMS from [facility] for abnormal labs. Patient non-verbal at baseline - limited history. Per NH chart  have a BUN greater than 200 and creatinine of 4.3 on outpatient labs. Outpatient CXR also w/ new L sided pleural effusion. With EMS patient was also found to be in irregular rhythm, no known history of A-fib or a flutter.  MICHELLE on CKD 3a - severe azotemia  likely due to GIB / hypotension  Started HD on 6/1/23  Acute anemia d/t UGIB  Troponemia  Lactic acidosis  DKA   Afib   -  repeat labs today / if creatinine increasing will do hd   - UO was 900 / ? dropped to 175   - continue bumex   - BP noted / continue midodrine   - last  ph noted / no binders   - d/c   sodium bicarbonate   - very poor prognosis   will follow

## 2023-06-08 NOTE — PROGRESS NOTE ADULT - SUBJECTIVE AND OBJECTIVE BOX
Patient is a 63y old  Male who presents with a chief complaint of Abnormal labs (07 Jun 2023 17:05)        Over Night Events:  on MV.  Off pressors.          ROS:     All ROS are negative except HPI         PHYSICAL EXAM    ICU Vital Signs Last 24 Hrs  T(C): 36.1 (08 Jun 2023 05:24), Max: 37.5 (07 Jun 2023 13:00)  T(F): 97 (08 Jun 2023 05:24), Max: 99.5 (07 Jun 2023 13:00)  HR: 84 (08 Jun 2023 05:24) (74 - 88)  BP: 118/52 (08 Jun 2023 05:24) (110/64 - 119/52)  BP(mean): --  ABP: --  ABP(mean): --  RR: 20 (08 Jun 2023 05:24) (19 - 22)  SpO2: 97% (08 Jun 2023 05:24) (97% - 100%)    O2 Parameters below as of 08 Jun 2023 05:24  Patient On (Oxygen Delivery Method): ventilator            CONSTITUTIONAL:  Ill appearing in   NAD    ENT:   Airway patent,   Mouth with normal mucosa.   Trach     EYES:   Pupils equal,   Round and reactive to light.    CARDIAC:   Normal rate,   Regular rhythm.    No edema      RESPIRATORY:   No wheezing  Bilateral BS  Normal chest expansion  Not tachypneic,  No use of accessory muscles    GASTROINTESTINAL:  Abdomen soft,   Non-tender,   No guarding,   + BS    MUSCULOSKELETAL:   Range of motion is limited,  No clubbing, cyanosis    NEUROLOGICAL:   Does nto follow commands     SKIN:   Skin normal color for race,   No evidence of rash.    PSYCHIATRIC:   Normal mood and affect.   No apparent risk to self or others.        06-07-23 @ 07:01  -  06-08-23 @ 07:00  --------------------------------------------------------  IN:    Peptamen A.F.: 750 mL  Total IN: 750 mL    OUT:    Indwelling Catheter - Urethral (mL): 175 mL  Total OUT: 175 mL    Total NET: 575 mL          LABS:                            7.5    10.10 )-----------( 294      ( 07 Jun 2023 06:24 )             23.7                                               06-07    132<L>  |  94<L>  |  100<HH>  ----------------------------<  144<H>  4.6   |  27  |  2.7<H>    Ca    8.1<L>      07 Jun 2023 17:57  Mg     2.6     06-07    TPro  5.0<L>  /  Alb  1.8<L>  /  TBili  1.1  /  DBili  x   /  AST  30  /  ALT  <5  /  AlkPhos  161<H>  06-07                                                                                           LIVER FUNCTIONS - ( 07 Jun 2023 06:24 )  Alb: 1.8 g/dL / Pro: 5.0 g/dL / ALK PHOS: 161 U/L / ALT: <5 U/L / AST: 30 U/L / GGT: x                                                                                               Mode: CPAP with PS  FiO2: 40  PEEP: 8  PS: 10  ITime: 1  MAP: 11  PIP: 20                                          MEDICATIONS  (STANDING):  albuterol/ipratropium for Nebulization 3 milliLiter(s) Nebulizer every 6 hours  aMIOdarone    Tablet 200 milliGRAM(s) Oral daily  aMIOdarone    Tablet   Oral   aspirin  chewable 81 milliGRAM(s) Oral daily  atorvastatin 80 milliGRAM(s) Oral at bedtime  buMETAnide Injectable 2 milliGRAM(s) IV Push every 12 hours  chlorhexidine 0.12% Liquid 15 milliLiter(s) Oral Mucosa two times a day  chlorhexidine 2% Cloths 1 Application(s) Topical <User Schedule>  dextrose 5%. 1000 milliLiter(s) (50 mL/Hr) IV Continuous <Continuous>  dextrose 5%. 1000 milliLiter(s) (100 mL/Hr) IV Continuous <Continuous>  dextrose 50% Injectable 25 Gram(s) IV Push once  dextrose 50% Injectable 25 Gram(s) IV Push once  dextrose 50% Injectable 12.5 Gram(s) IV Push once  fluconAZOLE   40 mG/mL Suspension 200 milliGRAM(s) Enteral Tube every 24 hours  glucagon  Injectable 1 milliGRAM(s) IntraMuscular once  insulin glargine Injectable (LANTUS) 10 Unit(s) SubCutaneous at bedtime  insulin lispro (ADMELOG) corrective regimen sliding scale   SubCutaneous three times a day before meals  levETIRAcetam  Solution 500 milliGRAM(s) Oral two times a day  midodrine. 10 milliGRAM(s) Oral three times a day  pantoprazole    Tablet 40 milliGRAM(s) Oral before breakfast  silver sulfADIAZINE 1% Cream 1 Application(s) Topical every 12 hours  sodium bicarbonate 1300 milliGRAM(s) Oral every 8 hours    MEDICATIONS  (PRN):  acetaminophen     Tablet .. 650 milliGRAM(s) Oral every 6 hours PRN Temp greater or equal to 38C (100.4F), Mild Pain (1 - 3)  dextrose Oral Gel 15 Gram(s) Oral once PRN Blood Glucose LESS THAN 70 milliGRAM(s)/deciliter  sodium chloride 0.9% Bolus. 100 milliLiter(s) IV Bolus every 5 minutes PRN SBP LESS THAN or EQUAL to 80 mmHg      New X-rays reviewed:                                                                                  ECHO

## 2023-06-08 NOTE — PROGRESS NOTE ADULT - ASSESSMENT
62 yo M with  PMH of ICH (2021) s/p trach and PEG, HTN, HLD, T2DM, CAD s/p stents, Recurrent C diff, BIBEMS from [facility] for abnormal labs. Patient non-verbal at baseline - limited history. Per NH chart  have a BUN greater than 200 and creatinine of 4.3 on outpatient labs. Outpatient CXR also w/ new L sided pleural effusion. In ED, patient was hypotensive with Afib with RVR, found to be in acure renal failure, anemic with elevated trops. He was started on Amiodarone GTT, PPI GTT, gastric lavage with coffee ground secretions, started on broad spectrum abx and admitted to MICU  While in MICU, found to be in DKA, s/p insulin drip, MICHELLE started on HD, acute anemia s/p PRBC transfusion, NSTEMI with downtrending trops, now downgraded to SDU    Acute Renal Failure, started on HD 6/1  Fluid Overload  HAGMA  - non oliguric, RBUS without hydro   - started on HD 6/1 via R IJ [place]   - Cr improving, f/u Nephro if need further HD  - c/w bumex 2mg iv q12hr, HD per renal--> HD today   - c/w midodrine 10mg q8hr  - c/w sodium bicarb 1300mg PO TID  -  TTE 6/1 - EF 63%, GIDD   - nephrology following     Candida tropicalis UTI  s/p cefepime and vanc   Blood clts neg, URine with Candida tropicalis   on  fluconazole, ID following can change from IV to PO via gtube     Acute anemia  - HgB 6.7 (baseline 8) s/p 2U pRBC - improved to 9.9  - evaluated by GI - no evidence of GI bleed (PEG tube flushed with return of bile and no blood)  - Monitor H/H closely  - Active T+S    Hyponatremia, likely hypervolemic  - c/w bumex 2mg iv q12hr and HD per nephro  - daily BMP    Afib w/ RVR - now rate controlled   - currently rate controlled s/p amio gtt  - change amio to 200 q24H     CAD s/p stents  NSTEMI  - Trops 1.80-->1.63-->1.57-->1.55  - seen by cardio,  c/w asa and statin, medical management     DKA, resolved  - s/p insulin drip, now on basal/bolus and tube feeds. Monitor FS     Hx ICH s/p trach and PEG  Bedbound from NH   - c/w keppra 500mg BID for seizure prophylaxis    DNR only, overall prognosis is poor  Pending: HD per renal, diuresis, labs, dispo planning   64 yo M with  PMH of ICH (2021) s/p trach and PEG, HTN, HLD, T2DM, CAD s/p stents, Recurrent C diff, BIBEMS from [facility] for abnormal labs. Patient non-verbal at baseline - limited history. Per NH chart  have a BUN greater than 200 and creatinine of 4.3 on outpatient labs. Outpatient CXR also w/ new L sided pleural effusion. In ED, patient was hypotensive with Afib with RVR, found to be in acure renal failure, anemic with elevated trops. He was started on Amiodarone GTT, PPI GTT, gastric lavage with coffee ground secretions, started on broad spectrum abx and admitted to MICU  While in MICU, found to be in DKA, s/p insulin drip, MICHELLE started on HD, acute anemia s/p PRBC transfusion, NSTEMI with downtrending trops, now downgraded to SDU    Acute Renal Failure, started on HD 6/1  Fluid Overload  HAGMA  - non oliguric, RBUS without hydro   - started on HD 6/1 via R IJ [place]   - Cr slightly worse today, monitor UO, f/u Cr  if still worsening will do another round of HD  - c/w bumex 2mg iv q12hr, HD per renal--> HD today   - c/w midodrine 10mg q8hr  - c/w sodium bicarb 1300mg PO TID  -  TTE 6/1 - EF 63%, GIDD   - nephrology following     Candida tropicalis UTI  s/p cefepime and vanc   Blood clts neg, URine with Candida tropicalis   on  fluconazole, ID following can change from IV to PO via gtube     Acute anemia  - HgB 6.7 (baseline 8) s/p 2U pRBC - improved to 9.9  - evaluated by GI - no evidence of GI bleed (PEG tube flushed with return of bile and no blood)  - Monitor H/H closely  - Active T+S    Hyponatremia, likely hypervolemic  - c/w bumex 2mg iv q12hr and HD per nephro  - daily BMP    Afib w/ RVR - now rate controlled   - currently rate controlled s/p amio gtt  - change amio to 200 q24H     CAD s/p stents  NSTEMI  - Trops 1.80-->1.63-->1.57-->1.55  - seen by cardio,  c/w asa and statin, medical management     DKA, resolved  - s/p insulin drip, now on basal/bolus and tube feeds. Monitor FS     Hx ICH s/p trach and PEG  Bedbound from NH   - c/w keppra 500mg BID for seizure prophylaxis    DNR only, overall prognosis is poor  Pending: HD per renal, diuresis, labs, dispo planning

## 2023-06-09 NOTE — PROGRESS NOTE ADULT - SUBJECTIVE AND OBJECTIVE BOX
Nephrology progress note    Patient was seen and examined, events over the last 24 h noted .  Cr rising off HD    Allergies:  penicillin (Other)    Hospital Medications:   MEDICATIONS  (STANDING):  albuterol/ipratropium for Nebulization 3 milliLiter(s) Nebulizer every 6 hours  aMIOdarone    Tablet   Oral   aMIOdarone    Tablet 200 milliGRAM(s) Oral daily  aspirin  chewable 81 milliGRAM(s) Oral daily  atorvastatin 80 milliGRAM(s) Oral at bedtime  buMETAnide Injectable 2 milliGRAM(s) IV Push every 12 hours  chlorhexidine 0.12% Liquid 15 milliLiter(s) Oral Mucosa two times a day  chlorhexidine 2% Cloths 1 Application(s) Topical <User Schedule>  dextrose 5%. 1000 milliLiter(s) (100 mL/Hr) IV Continuous <Continuous>  dextrose 5%. 1000 milliLiter(s) (50 mL/Hr) IV Continuous <Continuous>  dextrose 50% Injectable 25 Gram(s) IV Push once  dextrose 50% Injectable 25 Gram(s) IV Push once  dextrose 50% Injectable 12.5 Gram(s) IV Push once  fluconAZOLE   40 mG/mL Suspension 200 milliGRAM(s) Enteral Tube every 24 hours  glucagon  Injectable 1 milliGRAM(s) IntraMuscular once  heparin   Injectable 5000 Unit(s) SubCutaneous every 12 hours  insulin glargine Injectable (LANTUS) 10 Unit(s) SubCutaneous at bedtime  insulin lispro (ADMELOG) corrective regimen sliding scale   SubCutaneous three times a day before meals  levETIRAcetam  Solution 500 milliGRAM(s) Oral two times a day  midodrine. 10 milliGRAM(s) Oral three times a day  pantoprazole    Tablet 40 milliGRAM(s) Oral before breakfast  polyethylene glycol 3350 17 Gram(s) Oral daily  potassium phosphate / sodium phosphate Powder (PHOS-NaK) 1 Packet(s) Oral three times a day  senna 2 Tablet(s) Oral at bedtime  silver sulfADIAZINE 1% Cream 1 Application(s) Topical every 12 hours        VITALS:  T(F): 100 (06-09-23 @ 04:48), Max: 100 (06-09-23 @ 04:48)  HR: 89 (06-09-23 @ 10:11)  BP: 124/62 (06-09-23 @ 04:48)  RR: 20 (06-09-23 @ 04:48)  SpO2: 99% (06-09-23 @ 10:11)  Wt(kg): --    06-07 @ 07:01  -  06-08 @ 07:00  --------------------------------------------------------  IN: 750 mL / OUT: 175 mL / NET: 575 mL    06-08 @ 07:01  -  06-09 @ 07:00  --------------------------------------------------------  IN: 1620 mL / OUT: 700 mL / NET: 920 mL          PHYSICAL EXAM:              trach/vent  	Pulm:  B/L aline   	CV:  S1S2; no rub  	Abd: +distended  	Vascular access:udall     LABS:  06-08    134<L>  |  95<L>  |  108<HH>  ----------------------------<  112<H>  5.3<H>   |  24  |  3.0<H>    Ca    8.1<L>      08 Jun 2023 16:00  Phos  1.8     06-08  Mg     2.6     06-08    TPro  5.0<L>  /  Alb  1.6<L>  /  TBili  0.3  /  DBili      /  AST  35  /  ALT  <5  /  AlkPhos  166<H>  06-08                          7.9    8.58  )-----------( 296      ( 08 Jun 2023 16:00 )             25.7       Urine Studies:      RADIOLOGY & ADDITIONAL STUDIES:

## 2023-06-09 NOTE — PROGRESS NOTE ADULT - ASSESSMENT
{\rtf1\sjwfnk51304\ansi\jicmvrn3287\ftnbj\uc1\deff0  {\fonttbl{\f0 \fnil Segoe UI;}{\f1 \fnil \fcharset0 Segoe UI;}{\f2 \fnil Times New Jonathan;}}  {\colortbl ;\vhr388\ibkak294\gcfh520 ;\red0\green0\blue0 ;\red0\green0\blue0 ;}  {\stylesheet{\f0\fs20 Normal;}{\cs1 Default Paragraph Font;}{\cs2\f0\fs16 Line Number;}{\cs3\f2\fs24 Hyperlink;}}  {\*\revtbl{Unknown;}}  \ihqnxe60833\hfhvgy74867\moedz2885\gwtwh3041\valtb2541\fkeqy7223\fqnaghu887\ejqswyh155\nogrowautofit\fyfsup199\formshade\nofeaturethrottle1\dntblnsbdb\fet4\aendnotes\aftnnrlc\pgbrdrhead\pgbrdrfoot  \sectd\oujpfw75643\yeolke95072\guttersxn0\mbnvjnqs7147\fdqoubpv1497\jlirzqyo4647\gvotsxeo0252\gcvpcxu941\bqoayjq215\sbkpage\pgncont\pgndec  \plain\plain\f0\fs24\ql\plain\f0\fs24\plain\f1\fs16\zpdf7919\hich\f1\dbch\f1\loch\f1\cf2\fs16 64 yo M with  PMH of ICH (2021) s/p trach and PEG, HTN, HLD, T2DM, CAD s/p stents, Recurrent C diff, BIBEMS from [facility] for abnormal labs. Patient non-verbal   at baseline - limited history. Per NH chart  have a BUN greater than 200 and creatinine of 4.3 on outpatient labs. Outpatient CXR also w/ new L sided pleural effusion. In ED, patient was hypotensive with Afib with RVR, found to be in acure renal failure,   anemic with elevated trops. He was started on Amiodarone GTT, PPI GTT, gastric lavage with coffee ground secretions, started on broad spectrum abx and admitted to MICU\par  While in MICU, found to be in DKA, s/p insulin drip, MICHELLE started on HD, acute anemia s/p PRBC transfusion, NSTEMI with downtrending trops, now downgraded to SDU\par  \par  \plain\f1\fs16\reor3321\hich\f1\dbch\f1\loch\f1\cf2\fs16\b Acute Renal Failure, started on HD 6/1\par  Fluid Overload\par  HAGMA\plain\f1\fs16\nbpw1592\hich\f1\dbch\f1\loch\f1\cf2\fs16\par  - non oliguric, RBUS without hydro \par  - started on HD 6/1 via [facility] [place] \par  \plain\f1\fs16\audd0855\hich\f1\dbch\f1\loch\f1\cf2\fs16\b - Cr slightly worse today, monitor UO, f/u Cr\par  if still worsening will do another round of HD\plain\f1\fs16\rjci1900\hich\f1\dbch\f1\loch\f1\cf2\fs16\par  - c/w bumex 2mg iv q12hr, HD per renal--> HD today \par  - c/w midodrine 10mg q8hr\par  - c/w sodium bicarb 1300mg PO TID\par  -  TTE 6/1 - EF 63%, GIDD \par  - nephrology following \par  \plain\f1\fs16\uzma0704\hich\f1\dbch\f1\loch\f1\cf2\fs16\b\par  Candida tropicalis UTI\par  \plain\f1\fs16\aoay8561\hich\f1\dbch\f1\loch\f1\cf2\fs16 s/p cefepime and vanc \par  Blood clts neg, URine with Candida tropicalis \par  on  fluconazole, ID following can change from IV to PO via gtube \par  \par  \plain\f1\fs16\vill5965\hich\f1\dbch\f1\loch\f1\cf2\fs16\b Acute anemia\plain\f1\fs16\uhvv9452\hich\f1\dbch\f1\loch\f1\cf2\fs16\par  - HgB 6.7 (baseline 8) s/p 2U pRBC - improved to 9.9\par  - evaluated by GI - no evidence of GI bleed (PEG tube flushed with return of bile and no blood)\par  - Monitor H/H closely\par  - Active T+S\par  \par  \plain\f1\fs16\xcql2102\hich\f1\dbch\f1\loch\f1\cf2\fs16\b Hyponatremia, likely hypervolemic\plain\f1\fs16\adys4715\hich\f1\dbch\f1\loch\f1\cf2\fs16\par  - c/w bumex 2mg iv q12hr and HD per nephro\par  - daily BMP\par  \par  \plain\f1\fs16\vspo0282\hich\f1\dbch\f1\loch\f1\cf2\fs16\b Afib w/ RVR - now rate controlled \plain\f1\fs16\iagw7329\hich\f1\dbch\f1\loch\f1\cf2\fs16\par  - currently rate controlled s/p amio gtt\par  - change amio to 200 q24H \par  \par  \plain\f1\fs16\kedr7102\hich\f1\dbch\f1\loch\f1\cf2\fs16\b CAD s/p stents\par  NSTEMI\plain\f1\fs16\avgz4729\hich\f1\dbch\f1\loch\f1\cf2\fs16\par  - Trops 1.80-->1.63-->1.57-->1.55\par  - seen by cardio,  c/w asa and statin, medical management \par  \par  \plain\f1\fs16\mhup8519\hich\f1\dbch\f1\loch\f1\cf2\fs16\b DKA, resolved\par  \plain\f1\fs16\lehc0455\hich\f1\dbch\f1\loch\f1\cf2\fs16 - s/p insulin drip, now on basal/bolus and tube feeds. Monitor FS \par  \par  \plain\f1\fs16\dmmj7578\hich\f1\dbch\f1\loch\f1\cf2\fs16\b Hx ICH s/p trach and PEG\par  Bedbound from NH \plain\f1\fs16\aqcq3702\hich\f1\dbch\f1\loch\f1\cf2\fs16\par  - c/w keppra 500mg BID for seizure prophylaxis\par  \par  DNR only, overall prognosis is poor\par  Pending: HD per renal, diuresis, labs, \plain\f1\fs16\euym4855\hich\f1\dbch\f1\loch\f1\cf2\fs16\b dispo planning\plain\f1\fs16\jlbu4344\hich\f1\dbch\f1\loch\f1\cf2\fs16\par  \par  \par  \plain\f1\fs16\kxuz7380\hich\f1\dbch\f1\loch\f1\cf2\fs16\strike\plain\f1\fs16\yehi3482\hich\f1\dbch\f1\loch\f1\cf2\fs16\plain\f1\fs16\lytf0507\hich\f1\dbch\f1\loch\f1\cf2\fs16\par  \par  {\*\bkmkstart bkClinDocSignatures}{\*\bkmkend bkClinDocSignatures}{\*\bkmkstart bkcommentSBK}{\*\bkmkend bkcommentSBK}\plain\f1\fs16\rknn4102\hich\f1\dbch\f1\loch\f1\cf2\fs16\b Electronic Signatures:\plain\f0\fs20\oxiw4890\hich\f0\dbch\f0\loch\f0\fs20\par  }   62 yo M with  PMH of ICH (2021) s/p trach and PEG, HTN, HLD, T2DM, CAD s/p stents, Recurrent C diff, BIBEMS from [facility] for abnormal labs. Patient non-verbal at baseline - limited history. Per NH chart  have a BUN greater than 200 and creatinine of 4.3 on outpatient labs. Outpatient CXR also w/ new L sided pleural effusion. In ED, patient was hypotensive with Afib with RVR, found to be in acure renal failure, anemic with elevated trops. He was started on Amiodarone GTT, PPI GTT, gastric lavage with coffee ground secretions, started on broad spectrum abx and admitted to MICU  While in MICU, found to be in DKA, s/p insulin drip, MICHELLE started on HD, acute anemia s/p PRBC transfusion, NSTEMI with downtrending trops, now downgraded to SDU    Acute Renal Failure, started on HD 6/1  Fluid Overload  HAGMA  - non oliguric, RBUS without hydro   - started on HD 6/1 via [facility] [place]   - Cr slightly worse today, monitor UO, f/u Cr  f/u Nephro if planning for dialysis today  - c/w bumex 2mg iv q12hr, HD per renal--> HD today   - c/w midodrine 10mg q8hr  - c/w sodium bicarb 1300mg PO TID  -  TTE 6/1 - EF 63%, GIDD   - nephrology following     Candida tropicalis UTI  s/p cefepime and vanc   Blood clts neg, URine with Candida tropicalis   on  fluconazole, ID following can change from IV to PO via gtube     Acute anemia  - HgB 6.7 (baseline 8) s/p 2U pRBC - improved to 9.9  - evaluated by GI - no evidence of GI bleed (PEG tube flushed with return of bile and no blood)  - Monitor H/H closely  - Active T+S    Hyponatremia, likely hypervolemic  - c/w bumex 2mg iv q12hr and HD per nephro  - daily BMP    Afib w/ RVR - now rate controlled   - currently rate controlled s/p amio gtt  - change amio to 200 q24H     CAD s/p stents  NSTEMI  - Trops 1.80-->1.63-->1.57-->1.55  - seen by cardio,  c/w asa and statin, medical management     DKA, resolved  - s/p insulin drip, now on basal/bolus and tube feeds. Monitor FS     Hx ICH s/p trach and PEG  Bedbound from NH   - c/w keppra 500mg BID for seizure prophylaxis    DNR only, overall prognosis is poor  Pending: HD per renal, diuresis, labs

## 2023-06-09 NOTE — PROGRESS NOTE ADULT - SUBJECTIVE AND OBJECTIVE BOX
VIJAYSTEPHONALICIA  63y  Male      Patient is a 63y old  Male who presents with a chief complaint of Abnormal labs     INTERVAL HPI/OVERNIGHT EVENTS:      ******************************* REVIEW OF SYSTEMS:**********************************************    All other review of systems negative    *********************** VITALS ******************************************    T(F): 100 (06-09-23 @ 04:48)  HR: 90 (06-09-23 @ 14:17) (80 - 90)  BP: 158/85 (06-09-23 @ 14:00) (122/68 - 158/85)  RR: 20 (06-09-23 @ 14:00) (18 - 20)  SpO2: 100% (06-09-23 @ 14:17) (99% - 100%)    06-08-23 @ 07:01  -  06-09-23 @ 07:00  --------------------------------------------------------  IN: 1620 mL / OUT: 700 mL / NET: 920 mL    06-09-23 @ 07:01  -  06-09-23 @ 15:24  --------------------------------------------------------  IN: 330 mL / OUT: 1000 mL / NET: -670 mL            06-08-23 @ 07:01  -  06-09-23 @ 07:00  --------------------------------------------------------  IN: 1620 mL / OUT: 700 mL / NET: 920 mL    06-09-23 @ 07:01  -  06-09-23 @ 15:24  --------------------------------------------------------  IN: 330 mL / OUT: 1000 mL / NET: -670 mL        ******************************** PHYSICAL EXAM:**************************************************  GENERAL: NAD    PSYCH: no agitation, baseline mentation  HEENT: vented thru trach     NERVOUS SYSTEM:  Alert & awake x 2  PULMONARY: MICHAEL, CTA    CARDIOVASCULAR: S1S2 RRR    GI: Soft, NT, ND; BS present.    EXTREMITIES:  2+ Peripheral Pulses, No clubbing, cyanosis, or edema    LYMPH: No lymphadenopathy noted    SKIN: multiple decubiti     **************************** LABS *******************************************************                          6.8    11.38 )-----------( 310      ( 09 Jun 2023 11:12 )             22.2     06-09    132<L>  |  95<L>  |  110<HH>  ----------------------------<  165<H>  5.2<H>   |  27  |  3.0<H>    Ca    8.3<L>      09 Jun 2023 11:12  Phos  2.0     06-09  Mg     2.7     06-09    TPro  5.4<L>  /  Alb  1.9<L>  /  TBili  0.4  /  DBili  x   /  AST  31  /  ALT  <5  /  AlkPhos  168<H>  06-09          Lactate Trend        CAPILLARY BLOOD GLUCOSE      POCT Blood Glucose.: 143 mg/dL (09 Jun 2023 12:12)          **************************Active Medications *******************************************  penicillin (Other)      acetaminophen     Tablet .. 650 milliGRAM(s) Oral every 6 hours PRN  albuterol/ipratropium for Nebulization 3 milliLiter(s) Nebulizer every 6 hours  aMIOdarone    Tablet 200 milliGRAM(s) Oral daily  aMIOdarone    Tablet   Oral   aspirin  chewable 81 milliGRAM(s) Oral daily  atorvastatin 80 milliGRAM(s) Oral at bedtime  buMETAnide Injectable 2 milliGRAM(s) IV Push every 12 hours  chlorhexidine 0.12% Liquid 15 milliLiter(s) Oral Mucosa two times a day  chlorhexidine 2% Cloths 1 Application(s) Topical <User Schedule>  dextrose 5%. 1000 milliLiter(s) IV Continuous <Continuous>  dextrose 5%. 1000 milliLiter(s) IV Continuous <Continuous>  dextrose 50% Injectable 25 Gram(s) IV Push once  dextrose 50% Injectable 25 Gram(s) IV Push once  dextrose 50% Injectable 12.5 Gram(s) IV Push once  dextrose Oral Gel 15 Gram(s) Oral once PRN  fluconAZOLE   40 mG/mL Suspension 200 milliGRAM(s) Enteral Tube every 24 hours  glucagon  Injectable 1 milliGRAM(s) IntraMuscular once  heparin   Injectable 5000 Unit(s) SubCutaneous every 12 hours  insulin glargine Injectable (LANTUS) 10 Unit(s) SubCutaneous at bedtime  insulin lispro (ADMELOG) corrective regimen sliding scale   SubCutaneous three times a day before meals  levETIRAcetam  Solution 500 milliGRAM(s) Oral two times a day  midodrine. 10 milliGRAM(s) Oral three times a day  pantoprazole    Tablet 40 milliGRAM(s) Oral before breakfast  polyethylene glycol 3350 17 Gram(s) Oral daily  potassium phosphate / sodium phosphate Powder (PHOS-NaK) 1 Packet(s) Oral three times a day  senna 2 Tablet(s) Oral at bedtime  silver sulfADIAZINE 1% Cream 1 Application(s) Topical every 12 hours  sodium chloride 0.9% Bolus. 100 milliLiter(s) IV Bolus every 5 minutes PRN      ***************************************************  RADIOLOGY & ADDITIONAL TESTS:    Imaging Personally Reviewed:  [ ] YES  [ ] NO    HEALTH ISSUES - PROBLEM Dx:  Sepsis    Acute UTI    History of intracranial hemorrhage    Palliative care by specialist    Anemia    MICHELLE (acute kidney injury)

## 2023-06-09 NOTE — PROGRESS NOTE ADULT - ASSESSMENT
62 yo M with  PMH of ICH (2021) s/p trach and PEG, HTN, HLD, T2DM, CAD s/p stents, Recurrent C diff, BIBEMS from [facility] for abnormal labs. Patient non-verbal at baseline - limited history. Per NH chart  have a BUN greater than 200 and creatinine of 4.3 on outpatient labs. Outpatient CXR also w/ new L sided pleural effusion. With EMS patient was also found to be in irregular rhythm, no known history of A-fib or a flutter.  MICHELLE on CKD 3a - severe azotemia  likely due to GIB / hypotension  Started HD on 6/1/23  Acute anemia d/t UGIB  Troponemia  Lactic acidosis  DKA   Afib   -  cr rising since last hD will dialyze today  - continue bumex , has some UOP but not as much as ould hope with bumex  - BP noted / continue midodrine   - last  phos noted / no binders   - very poor prognosis   will follow

## 2023-06-09 NOTE — PROGRESS NOTE ADULT - ASSESSMENT
62 yo M with  PMH of ICH (2021) s/p trach and PEG, HTN, HLD, T2DM, CAD s/p stents, Recurrent C diff, BIBEMS from [facility] for abnormal labs. Patient non-verbal at baseline - limited history. Per NH chart  have a BUN greater than 200 and creatinine of 4.3 on outpatient labs. Outpatient CXR also w/ new L sided pleural effusion. In ED, patient was hypotensive with Afib with RVR, found to be in acure renal failure, anemic with elevated trops. He was started on Amiodarone GTT, PPI GTT, gastric lavage with coffee ground secretions, started on broad spectrum abx and admitted to MICU  While in MICU, found to be in DKA, s/p insulin drip, MICHELLE started on HD, acute anemia s/p PRBC transfusion, NSTEMI with downtrending trops, now downgraded to SDU    Acute Renal Failure, started on HD 6/1  Fluid Overload  HAGMA  - non oliguric, RBUS without hydro   - started on HD 6/1 via R [facility] [place]   - Cr trending up   - c/w bumex 2mg iv q12hr, HD per renal--> HD today   - c/w midodrine 10mg q8hr  - c/w sodium bicarb 1300mg PO TID  -  TTE 6/1 - EF 63%, GIDD     Candida tropicalis UTI  s/p cefepime and vanc   Blood clts neg, URine with Candida tropicalis   on  fluconazole, changed from IV to PO via gtube     Acute anemia  - HgB 6.7 (baseline 8) s/p 2U pRBC - improved to 9.9  - evaluated by GI - no evidence of GI bleed (PEG tube flushed with return of bile and no blood)  - Monitor H/H closely  - Active T+S    Hyponatremia, likely hypervolemic  - c/w bumex 2mg iv q12hr and HD per nephro  - daily BMP    Afib w/ RVR - now rate controlled   - currently rate controlled s/p amio gtt  - change amio to 200 q24H     CAD s/p stents  NSTEMI  - Trops 1.80-->1.63-->1.57-->1.55  - seen by cardio,  c/w asa and statin, medical management     DKA, resolved  - s/p insulin drip, now on basal/bolus and tube feeds. Monitor FS     Hx ICH s/p trach and PEG  Bedbound from NH   - c/w keppra 500mg BID for seizure prophylaxis    DNR only, overall prognosis is poor  Pending: HD per renal  Dispo: SNF with Vent and ? HD

## 2023-06-09 NOTE — PROGRESS NOTE ADULT - SUBJECTIVE AND OBJECTIVE BOX
DAILY PROGRESS NOTE  ===========================================================    Patient Information:  ALICIA BARBOUR  /  63y  /  Male  /  MRN#: [id number]    Patient is a 63y old Male who presents with a chief complaint of Abnormal labs (08 Jun 2023 16:35)      Hospital Day: 10d     |:::::::::::::::::::::::::::| SUBJECTIVE |:::::::::::::::::::::::::::|    OVERNIGHT EVENTS: none.  TODAY: Patient was seen today at bedside. Review of systems is otherwise negative.    |:::::::::::::::::::::::::::| OBJECTIVE |:::::::::::::::::::::::::::|    VITAL SIGNS: Last 24 Hours  T(C): 37.8 (09 Jun 2023 04:48), Max: 37.8 (09 Jun 2023 04:48)  T(F): 100 (09 Jun 2023 04:48), Max: 100 (09 Jun 2023 04:48)  HR: 83 (09 Jun 2023 04:48) (80 - 88)  BP: 124/62 (09 Jun 2023 04:48) (120/54 - 124/62)  BP(mean): --  RR: 20 (09 Jun 2023 04:48) (18 - 20)  SpO2: 100% (09 Jun 2023 04:48) (99% - 100%)    06-08-23 @ 07:01  -  06-09-23 @ 07:00  --------------------------------------------------------  IN: 1620 mL / OUT: 700 mL / NET: 920 mL      PHYSICAL EXAM:  GENERAL: Awake, alert; NAD.  HEENT: Head NC/AT  CARDIO: Regular rate; Regular rhythm; S1 & S2.  RESP: No rales, wheezing, or rhonchi appreciated.  GI: Soft; NT/ND; BS  EXT: No edema.   SKIN: Intact, no rashes, no erythema    LAB RESULTS:                        7.9    8.58  )-----------( 296      ( 08 Jun 2023 16:00 )             25.7     06-08    134<L>  |  95<L>  |  108<HH>  ----------------------------<  112<H>  5.3<H>   |  24  |  3.0<H>    Ca    8.1<L>      08 Jun 2023 16:00  Phos  1.8     06-08  Mg     2.6     06-08    TPro  5.0<L>  /  Alb  1.6<L>  /  TBili  0.3  /  DBili  x   /  AST  35  /  ALT  <5  /  AlkPhos  166<H>  06-08                MICROBIOLOGY:    Culture - Blood (collected 07 Jun 2023 06:24)  Source: .Blood None  Preliminary Report (08 Jun 2023 23:02):    No growth to date.      RADIOLOGY:    ALLERGIES:  penicillin (Other)    MEDICATIONS:  acetaminophen     Tablet .. 650 milliGRAM(s) Oral every 6 hours PRN  albuterol/ipratropium for Nebulization 3 milliLiter(s) Nebulizer every 6 hours  aMIOdarone    Tablet 200 milliGRAM(s) Oral daily  aMIOdarone    Tablet   Oral   aspirin  chewable 81 milliGRAM(s) Oral daily  atorvastatin 80 milliGRAM(s) Oral at bedtime  buMETAnide Injectable 2 milliGRAM(s) IV Push every 12 hours  chlorhexidine 0.12% Liquid 15 milliLiter(s) Oral Mucosa two times a day  chlorhexidine 2% Cloths 1 Application(s) Topical <User Schedule>  dextrose 5%. 1000 milliLiter(s) IV Continuous <Continuous>  dextrose 5%. 1000 milliLiter(s) IV Continuous <Continuous>  dextrose 50% Injectable 25 Gram(s) IV Push once  dextrose 50% Injectable 25 Gram(s) IV Push once  dextrose 50% Injectable 12.5 Gram(s) IV Push once  dextrose Oral Gel 15 Gram(s) Oral once PRN  fluconAZOLE   40 mG/mL Suspension 200 milliGRAM(s) Enteral Tube every 24 hours  glucagon  Injectable 1 milliGRAM(s) IntraMuscular once  insulin glargine Injectable (LANTUS) 10 Unit(s) SubCutaneous at bedtime  insulin lispro (ADMELOG) corrective regimen sliding scale   SubCutaneous three times a day before meals  levETIRAcetam  Solution 500 milliGRAM(s) Oral two times a day  midodrine. 10 milliGRAM(s) Oral three times a day  pantoprazole    Tablet 40 milliGRAM(s) Oral before breakfast  polyethylene glycol 3350 17 Gram(s) Oral daily  potassium phosphate / sodium phosphate Powder (PHOS-NaK) 1 Packet(s) Oral three times a day  senna 2 Tablet(s) Oral at bedtime  silver sulfADIAZINE 1% Cream 1 Application(s) Topical every 12 hours  sodium chloride 0.9% Bolus. 100 milliLiter(s) IV Bolus every 5 minutes PRN    ===========================================================

## 2023-06-10 NOTE — PROGRESS NOTE ADULT - SUBJECTIVE AND OBJECTIVE BOX
DAILY PROGRESS NOTE  ===========================================================    Patient Information:  ALICIA BARBOUR  /  63y  /  Male  /  MRN#: [id number]    Patient is a 63y old Male who presents with a chief complaint of Abnormal labs (10 Deepak 2023 07:46)      Hospital Day: 11d     |:::::::::::::::::::::::::::| SUBJECTIVE |:::::::::::::::::::::::::::|    OVERNIGHT EVENTS: none.  TODAY: Patient was seen today at bedside. Review of systems is otherwise negative.    |:::::::::::::::::::::::::::| OBJECTIVE |:::::::::::::::::::::::::::|    VITAL SIGNS: Last 24 Hours  T(C): 37.6 (09 Jun 2023 20:14), Max: 37.6 (09 Jun 2023 20:14)  T(F): 99.7 (09 Jun 2023 20:14), Max: 99.7 (09 Jun 2023 20:14)  HR: 93 (10 Deepak 2023 04:25) (81 - 97)  BP: 123/67 (09 Jun 2023 20:14) (123/67 - 159/75)  BP(mean): --  RR: 20 (09 Jun 2023 20:14) (20 - 20)  SpO2: 100% (10 Deepak 2023 04:25) (99% - 100%)    06-09-23 @ 07:01  -  06-10-23 @ 07:00  --------------------------------------------------------  IN: 765 mL / OUT: 1300 mL / NET: -535 mL      PHYSICAL EXAM:  GENERAL: Arousable but lethargic, trached and vented, peg, anasarca  HEENT: Head NC/AT  CARDIO: Regular rate; Regular rhythm; S1 & S2.  RESP: No rales, wheezing, or rhonchi appreciated.  GI: Soft; NT/ND; BS  EXT: edematous   SKIN: r/ heel, buttock, upper back stage 2, unstageable sacral ulcer dressed      LAB RESULTS:                        7.7    10.22 )-----------( 274      ( 09 Jun 2023 21:29 )             24.1     06-09    134<L>  |  98  |  62<HH>  ----------------------------<  94  4.6   |  24  |  1.9<H>    Ca    8.0<L>      09 Jun 2023 18:08  Phos  2.0     06-09  Mg     2.7     06-09    TPro  5.4<L>  /  Alb  1.9<L>  /  TBili  0.4  /  DBili  x   /  AST  31  /  ALT  <5  /  AlkPhos  168<H>  06-09                MICROBIOLOGY:    RADIOLOGY:    ALLERGIES:  penicillin (Other)    MEDICATIONS:  acetaminophen     Tablet .. 650 milliGRAM(s) Oral every 6 hours PRN  albuterol/ipratropium for Nebulization 3 milliLiter(s) Nebulizer every 6 hours  aMIOdarone    Tablet   Oral   aMIOdarone    Tablet 200 milliGRAM(s) Oral daily  aspirin  chewable 81 milliGRAM(s) Oral daily  atorvastatin 80 milliGRAM(s) Oral at bedtime  buMETAnide Injectable 2 milliGRAM(s) IV Push every 12 hours  chlorhexidine 0.12% Liquid 15 milliLiter(s) Oral Mucosa two times a day  chlorhexidine 2% Cloths 1 Application(s) Topical <User Schedule>  dextrose 5%. 1000 milliLiter(s) IV Continuous <Continuous>  dextrose 5%. 1000 milliLiter(s) IV Continuous <Continuous>  dextrose 50% Injectable 25 Gram(s) IV Push once  dextrose 50% Injectable 25 Gram(s) IV Push once  dextrose 50% Injectable 12.5 Gram(s) IV Push once  dextrose Oral Gel 15 Gram(s) Oral once PRN  fluconAZOLE   40 mG/mL Suspension 200 milliGRAM(s) Enteral Tube every 24 hours  glucagon  Injectable 1 milliGRAM(s) IntraMuscular once  heparin   Injectable 5000 Unit(s) SubCutaneous every 12 hours  insulin glargine Injectable (LANTUS) 10 Unit(s) SubCutaneous at bedtime  insulin lispro (ADMELOG) corrective regimen sliding scale   SubCutaneous three times a day before meals  levETIRAcetam  Solution 500 milliGRAM(s) Oral two times a day  midodrine. 10 milliGRAM(s) Oral three times a day  pantoprazole    Tablet 40 milliGRAM(s) Oral before breakfast  polyethylene glycol 3350 17 Gram(s) Oral daily  potassium phosphate / sodium phosphate Powder (PHOS-NaK) 1 Packet(s) Oral three times a day  senna 2 Tablet(s) Oral at bedtime  silver sulfADIAZINE 1% Cream 1 Application(s) Topical every 12 hours  sodium chloride 0.9% Bolus. 100 milliLiter(s) IV Bolus every 5 minutes PRN    ===========================================================

## 2023-06-10 NOTE — PROGRESS NOTE ADULT - SUBJECTIVE AND OBJECTIVE BOX
VIJAYSTEPHONALICIA  63y  Male      Patient is a 63y old  Male who presents with a chief complaint of Abnormal labs     INTERVAL HPI/OVERNIGHT EVENTS:      ******************************* REVIEW OF SYSTEMS:**********************************************    All other review of systems negative    *********************** VITALS ******************************************    T(F): 99 (06-10-23 @ 13:30)  HR: 92 (06-10-23 @ 13:30) (88 - 97)  BP: 118/56 (06-10-23 @ 13:30) (118/56 - 159/75)  RR: 20 (06-09-23 @ 20:14) (20 - 20)  SpO2: 100% (06-10-23 @ 13:30) (100% - 100%)    06-09-23 @ 07:01  -  06-10-23 @ 07:00  --------------------------------------------------------  IN: 765 mL / OUT: 1300 mL / NET: -535 mL    06-10-23 @ 07:01  -  06-10-23 @ 15:29  --------------------------------------------------------  IN: 0 mL / OUT: 300 mL / NET: -300 mL            06-09-23 @ 07:01  -  06-10-23 @ 07:00  --------------------------------------------------------  IN: 765 mL / OUT: 1300 mL / NET: -535 mL    06-10-23 @ 07:01  -  06-10-23 @ 15:29  --------------------------------------------------------  IN: 0 mL / OUT: 300 mL / NET: -300 mL        ******************************** PHYSICAL EXAM:**************************************************  GENERAL: NAD    PSYCH: no agitation, baseline mentation  HEENT: vented thru trach     NERVOUS SYSTEM:  Alert & awake x2   PULMONARY: MICHAEL, CTA    CARDIOVASCULAR: S1S2 RRR    GI: Soft, NT, ND; BS present.    EXTREMITIES:  2+ Peripheral Pulses, No clubbing, cyanosis, or edema    LYMPH: No lymphadenopathy noted    SKIN: sacral decub     **************************** LABS *******************************************************                          7.6    10.40 )-----------( 259      ( 10 Deepak 2023 08:35 )             23.9     06-10    134<L>  |  98  |  69<HH>  ----------------------------<  147<H>  4.5   |  25  |  2.0<H>    Ca    7.8<L>      10 Deepak 2023 08:35  Phos  2.0     06-09  Mg     2.2     06-10    TPro  4.4<L>  /  Alb  1.7<L>  /  TBili  0.3  /  DBili  x   /  AST  34  /  ALT  <5  /  AlkPhos  148<H>  06-10          Lactate Trend        CAPILLARY BLOOD GLUCOSE      POCT Blood Glucose.: 143 mg/dL (10 Deepak 2023 11:25)          **************************Active Medications *******************************************  penicillin (Other)      acetaminophen     Tablet .. 650 milliGRAM(s) Oral every 6 hours PRN  albuterol/ipratropium for Nebulization 3 milliLiter(s) Nebulizer every 6 hours  aMIOdarone    Tablet 200 milliGRAM(s) Oral daily  aMIOdarone    Tablet   Oral   aspirin  chewable 81 milliGRAM(s) Oral daily  atorvastatin 80 milliGRAM(s) Oral at bedtime  buMETAnide Injectable 2 milliGRAM(s) IV Push every 12 hours  chlorhexidine 0.12% Liquid 15 milliLiter(s) Oral Mucosa two times a day  chlorhexidine 2% Cloths 1 Application(s) Topical <User Schedule>  dextrose 5%. 1000 milliLiter(s) IV Continuous <Continuous>  dextrose 5%. 1000 milliLiter(s) IV Continuous <Continuous>  dextrose 50% Injectable 25 Gram(s) IV Push once  dextrose 50% Injectable 25 Gram(s) IV Push once  dextrose 50% Injectable 12.5 Gram(s) IV Push once  dextrose Oral Gel 15 Gram(s) Oral once PRN  fluconAZOLE   40 mG/mL Suspension 200 milliGRAM(s) Enteral Tube every 24 hours  glucagon  Injectable 1 milliGRAM(s) IntraMuscular once  heparin   Injectable 5000 Unit(s) SubCutaneous every 12 hours  insulin glargine Injectable (LANTUS) 10 Unit(s) SubCutaneous at bedtime  insulin lispro (ADMELOG) corrective regimen sliding scale   SubCutaneous three times a day before meals  levETIRAcetam  Solution 500 milliGRAM(s) Oral two times a day  midodrine. 2.5 milliGRAM(s) Oral three times a day PRN  pantoprazole    Tablet 40 milliGRAM(s) Oral before breakfast  polyethylene glycol 3350 17 Gram(s) Oral daily  potassium phosphate / sodium phosphate Powder (PHOS-NaK) 1 Packet(s) Oral three times a day  senna 2 Tablet(s) Oral at bedtime  silver sulfADIAZINE 1% Cream 1 Application(s) Topical every 12 hours  sodium chloride 0.9% Bolus. 100 milliLiter(s) IV Bolus every 5 minutes PRN      ***************************************************  RADIOLOGY & ADDITIONAL TESTS:    Imaging Personally Reviewed:  [ ] YES  [ ] NO    HEALTH ISSUES - PROBLEM Dx:  Sepsis    Acute UTI    History of intracranial hemorrhage    Palliative care by specialist    Anemia    MICHELLE (acute kidney injury)

## 2023-06-10 NOTE — PROGRESS NOTE ADULT - SUBJECTIVE AND OBJECTIVE BOX
Nephrology progress note    THIS IS AN INCOMPLETE NOTE . FULL NOTE TO FOLLOW SHORTLY    Patient is seen and examined, events over the last 24 h noted .    Allergies:  penicillin (Other)    Hospital Medications:   MEDICATIONS  (STANDING):  albuterol/ipratropium for Nebulization 3 milliLiter(s) Nebulizer every 6 hours  aMIOdarone    Tablet   Oral   aMIOdarone    Tablet 200 milliGRAM(s) Oral daily  aspirin  chewable 81 milliGRAM(s) Oral daily  atorvastatin 80 milliGRAM(s) Oral at bedtime  buMETAnide Injectable 2 milliGRAM(s) IV Push every 12 hours  chlorhexidine 0.12% Liquid 15 milliLiter(s) Oral Mucosa two times a day  chlorhexidine 2% Cloths 1 Application(s) Topical <User Schedule>  dextrose 5%. 1000 milliLiter(s) (100 mL/Hr) IV Continuous <Continuous>  dextrose 5%. 1000 milliLiter(s) (50 mL/Hr) IV Continuous <Continuous>  dextrose 50% Injectable 25 Gram(s) IV Push once  dextrose 50% Injectable 25 Gram(s) IV Push once  dextrose 50% Injectable 12.5 Gram(s) IV Push once  fluconAZOLE   40 mG/mL Suspension 200 milliGRAM(s) Enteral Tube every 24 hours  glucagon  Injectable 1 milliGRAM(s) IntraMuscular once  heparin   Injectable 5000 Unit(s) SubCutaneous every 12 hours  insulin glargine Injectable (LANTUS) 10 Unit(s) SubCutaneous at bedtime  insulin lispro (ADMELOG) corrective regimen sliding scale   SubCutaneous three times a day before meals  levETIRAcetam  Solution 500 milliGRAM(s) Oral two times a day  midodrine. 10 milliGRAM(s) Oral three times a day  pantoprazole    Tablet 40 milliGRAM(s) Oral before breakfast  polyethylene glycol 3350 17 Gram(s) Oral daily  potassium phosphate / sodium phosphate Powder (PHOS-NaK) 1 Packet(s) Oral three times a day  senna 2 Tablet(s) Oral at bedtime  silver sulfADIAZINE 1% Cream 1 Application(s) Topical every 12 hours        VITALS:  T(F): 99.7 (06-09-23 @ 20:14), Max: 99.7 (06-09-23 @ 20:14)  HR: 93 (06-10-23 @ 04:25)  BP: 123/67 (06-09-23 @ 20:14)  RR: 20 (06-09-23 @ 20:14)  SpO2: 100% (06-10-23 @ 04:25)  Wt(kg): --    06-08 @ 07:01  -  06-09 @ 07:00  --------------------------------------------------------  IN: 1620 mL / OUT: 700 mL / NET: 920 mL    06-09 @ 07:01  -  06-10 @ 07:00  --------------------------------------------------------  IN: 765 mL / OUT: 1300 mL / NET: -535 mL          PHYSICAL EXAM:  Constitutional: NAD  HEENT: anicteric sclera, oropharynx clear, MMM  Neck: No JVD  Respiratory: CTAB, no wheezes, rales or rhonchi  Cardiovascular: S1, S2, RRR  Gastrointestinal: BS+, soft, NT/ND  Extremities: No cyanosis or clubbing. No peripheral edema  :  No teixeira.   Skin: No rashes    LABS:  06-09    134<L>  |  98  |  62<HH>  ----------------------------<  94  4.6   |  24  |  1.9<H>    Ca    8.0<L>      09 Jun 2023 18:08  Phos  2.0     06-09  Mg     2.7     06-09    TPro  5.4<L>  /  Alb  1.9<L>  /  TBili  0.4  /  DBili      /  AST  31  /  ALT  <5  /  AlkPhos  168<H>  06-09                          7.7    10.22 )-----------( 274      ( 09 Jun 2023 21:29 )             24.1       Urine Studies:        Iron 51, TIBC 111, %sat 46      [06-06-23 @ 10:40]  Ferritin 1956      [06-06-23 @ 10:40]  Vitamin D (25OH) 70      [06-03-23 @ 06:50]    HBsAb <3.0      [06-01-23 @ 22:50]  HBsAb Nonreact      [06-01-23 @ 22:50]  HBsAg Nonreact      [06-01-23 @ 22:50]  HBcAb Nonreact      [06-01-23 @ 22:50]  HCV 0.09, Nonreact      [11-05-21 @ 09:04]  HIV Nonreact      [06-08-23 @ 16:00]  HIV Nonreact      [06-08-23 @ 12:20]        RADIOLOGY & ADDITIONAL STUDIES:   Nephrology progress note    Patient is seen and examined, events over the last 24 h noted .  On MV     Allergies:  penicillin (Other)    Hospital Medications:   MEDICATIONS  (STANDING):  albuterol/ipratropium for Nebulization 3 milliLiter(s) Nebulizer every 6 hours  aMIOdarone    Tablet 200 milliGRAM(s) Oral daily  aspirin  chewable 81 milliGRAM(s) Oral daily  atorvastatin 80 milliGRAM(s) Oral at bedtime  buMETAnide Injectable 2 milliGRAM(s) IV Push every 12 hours  fluconAZOLE   40 mG/mL Suspension 200 milliGRAM(s) Enteral Tube every 24 hours  glucagon  Injectable 1 milliGRAM(s) IntraMuscular once  heparin   Injectable 5000 Unit(s) SubCutaneous every 12 hours  insulin glargine Injectable (LANTUS) 10 Unit(s) SubCutaneous at bedtime  insulin lispro (ADMELOG) corrective regimen sliding scale   SubCutaneous three times a day before meals  levETIRAcetam  Solution 500 milliGRAM(s) Oral two times a day  midodrine. 10 milliGRAM(s) Oral three times a day  pantoprazole    Tablet 40 milliGRAM(s) Oral before breakfast  polyethylene glycol 3350 17 Gram(s) Oral daily  potassium phosphate / sodium phosphate Powder (PHOS-NaK) 1 Packet(s) Oral three times a day  senna 2 Tablet(s) Oral at bedtime  silver sulfADIAZINE 1% Cream 1 Application(s) Topical every 12 hours        VITALS:  T(F): 99.7 (06-09-23 @ 20:14), Max: 99.7 (06-09-23 @ 20:14)  HR: 93 (06-10-23 @ 04:25)  BP: 123/67 (06-09-23 @ 20:14)  RR: 20 (06-09-23 @ 20:14)  SpO2: 100% (06-10-23 @ 04:25)      06-08 @ 07:01  -  06-09 @ 07:00  --------------------------------------------------------  IN: 1620 mL / OUT: 700 mL / NET: 920 mL    06-09 @ 07:01  -  06-10 @ 07:00  --------------------------------------------------------  IN: 765 mL / OUT: 1300 mL / NET: -535 mL          PHYSICAL EXAM:  Constitutional: NAD  Respiratory: CTAB  Cardiovascular: S1, S2, RRR  Gastrointestinal: BS+, soft, NT/ND  Extremities: No cyanosis or clubbing. No peripheral edema  :  No teixeira.   Skin: No rashes    LABS:  06-09    134<L>  |  98  |  62<HH>  ----------------------------<  94  4.6   |  24  |  1.9<H>    Ca    8.0<L>      09 Jun 2023 18:08  Phos  2.0     06-09  Mg     2.7     06-09    TPro  5.4<L>  /  Alb  1.9<L>  /  TBili  0.4  /  DBili      /  AST  31  /  ALT  <5  /  AlkPhos  168<H>  06-09                          7.7    10.22 )-----------( 274      ( 09 Jun 2023 21:29 )             24.1       Urine Studies:        Iron 51, TIBC 111, %sat 46      [06-06-23 @ 10:40]  Ferritin 1956      [06-06-23 @ 10:40]  Vitamin D (25OH) 70      [06-03-23 @ 06:50]    HBsAb <3.0      [06-01-23 @ 22:50]  HBsAb Nonreact      [06-01-23 @ 22:50]  HBsAg Nonreact      [06-01-23 @ 22:50]  HBcAb Nonreact      [06-01-23 @ 22:50]  HCV 0.09, Nonreact      [11-05-21 @ 09:04]  HIV Nonreact      [06-08-23 @ 16:00]  HIV Nonreact      [06-08-23 @ 12:20]        RADIOLOGY & ADDITIONAL STUDIES:

## 2023-06-10 NOTE — PROGRESS NOTE ADULT - ASSESSMENT
64 yo M with  PMH of ICH (2021) s/p trach and PEG, HTN, HLD, T2DM, CAD s/p stents, Recurrent C diff, BIBEMS from [facility] for abnormal labs. Patient non-verbal at baseline - limited history. Per NH chart  have a BUN greater than 200 and creatinine of 4.3 on outpatient labs. Outpatient CXR also w/ new L sided pleural effusion. In ED, patient was hypotensive with Afib with RVR, found to be in acure renal failure, anemic with elevated trops. He was started on Amiodarone GTT, PPI GTT, gastric lavage with coffee ground secretions, started on broad spectrum abx and admitted to MICU  While in MICU, found to be in DKA, s/p insulin drip, MICHELLE started on HD, acute anemia s/p PRBC transfusion, NSTEMI with downtrending trops, now downgraded to SDU    Acute Renal Failure, started on HD 6/1  Fluid Overload  HAGMA  - non oliguric, RBUS without hydro   - started on HD 6/1 via R IJ [place]   - Cr trending down >> 3.0 >> 1.9  - c/w bumex   - c/w sodium bicarb 1300mg PO TID  -  TTE 6/1 - EF 63%, GIDD     Candida tropicalis UTI  s/p cefepime and vanc   Blood clts neg, URine with Candida tropicalis   on  fluconazole, changed from IV to PO via gtube     Acute anemia  - HgB 6.7 (baseline 8) s/p 2U pRBC - improved to 9.9  - evaluated by GI - no evidence of GI bleed (PEG tube flushed with return of bile and no blood)  - Monitor H/H closely  - Active T+S    Hyponatremia, likely hypervolemic  - c/w bumex 2mg iv q12hr and HD per nephro  - daily BMP    Afib w/ RVR - now rate controlled   - currently rate controlled s/p amio gtt  - change amio to 200 q24H     CAD s/p stents  NSTEMI  - Trops 1.80-->1.63-->1.57-->1.55  - seen by cardio,  c/w asa and statin, medical management     DKA, resolved  - s/p insulin drip, now on basal/bolus and tube feeds. Monitor FS     Hx ICH s/p trach and PEG  Bedbound from NH   - c/w keppra 500mg BID for seizure prophylaxis    DNR only, overall prognosis is poor  Pending: HD per renal  Dispo: SNF with Vent and ? HD

## 2023-06-10 NOTE — PROGRESS NOTE ADULT - ASSESSMENT
62 yo M with  PMH of ICH (2021) s/p trach and PEG, HTN, HLD, T2DM, CAD s/p stents, Recurrent C diff, BIBEMS from [facility] for abnormal labs. Patient non-verbal at baseline - limited history. Per NH chart  have a BUN greater than 200 and creatinine of 4.3 on outpatient labs. Outpatient CXR also w/ new L sided pleural effusion. With EMS patient was also found to be in irregular rhythm, no known history of A-fib or a flutter.    MICHELLE on CKD 3a - severe azotemia  likely due to GIB / hypotension  Started HD on 6/1/23  Acute anemia d/t UGIB  Troponemia  Lactic acidosis resolved   DKA resolved   Afib     -  sp HD yesterday no need for HD today/ will assess daily needs   - continue bumex , has some UOP  - BP noted / continue midodrine   - last  phos noted / no binders   - very poor prognosis   will follow

## 2023-06-11 NOTE — PROGRESS NOTE ADULT - SUBJECTIVE AND OBJECTIVE BOX
ALICIA BARBOUR  63y  Male      Patient is a 63y old  Male who presents with a chief complaint of Abnormal labs     INTERVAL HPI/OVERNIGHT EVENTS:      ******************************* REVIEW OF SYSTEMS:**********************************************    All other review of systems negative    *********************** VITALS ******************************************    T(F): 101.7 (06-11-23 @ 13:30)  HR: 95 (06-11-23 @ 13:30) (73 - 103)  BP: 120/65 (06-11-23 @ 13:30) (120/60 - 131/64)  RR: --  SpO2: 100% (06-11-23 @ 13:30) (99% - 100%)    06-10-23 @ 07:01  -  06-11-23 @ 07:00  --------------------------------------------------------  IN: 870 mL / OUT: 300 mL / NET: 570 mL            06-10-23 @ 07:01  -  06-11-23 @ 07:00  --------------------------------------------------------  IN: 870 mL / OUT: 300 mL / NET: 570 mL        ******************************** PHYSICAL EXAM:**************************************************  GENERAL: NAD    PSYCH: no agitation, baseline mentation  HEENT: Vented thru trach     NERVOUS SYSTEM:  Alert & awake x2      PULMONARY: MICHAEL, CTA    CARDIOVASCULAR: S1S2 RRR    GI: Soft, NT, ND; BS present.    EXTREMITIES:  2+ Peripheral Pulses, No clubbing, cyanosis, or edema    LYMPH: No lymphadenopathy noted    SKIN: No rashes or lesions      **************************** LABS *******************************************************                          7.6    10.40 )-----------( 259      ( 10 Deepak 2023 08:35 )             23.9     06-10    131<L>  |  96<L>  |  72<HH>  ----------------------------<  164<H>  4.7   |  22  |  2.3<H>    Ca    7.7<L>      10 Deepak 2023 18:42  Mg     2.2     06-10    TPro  4.4<L>  /  Alb  1.7<L>  /  TBili  0.3  /  DBili  x   /  AST  34  /  ALT  <5  /  AlkPhos  148<H>  06-10          Lactate Trend        CAPILLARY BLOOD GLUCOSE      POCT Blood Glucose.: 156 mg/dL (11 Jun 2023 11:27)          **************************Active Medications *******************************************  penicillin (Other)      acetaminophen     Tablet .. 650 milliGRAM(s) Oral every 6 hours PRN  albuterol/ipratropium for Nebulization 3 milliLiter(s) Nebulizer every 6 hours  aMIOdarone    Tablet   Oral   aMIOdarone    Tablet 200 milliGRAM(s) Oral daily  aspirin  chewable 81 milliGRAM(s) Oral daily  atorvastatin 80 milliGRAM(s) Oral at bedtime  buMETAnide Injectable 2 milliGRAM(s) IV Push every 12 hours  chlorhexidine 0.12% Liquid 15 milliLiter(s) Oral Mucosa two times a day  chlorhexidine 2% Cloths 1 Application(s) Topical <User Schedule>  dextrose 5%. 1000 milliLiter(s) IV Continuous <Continuous>  dextrose 5%. 1000 milliLiter(s) IV Continuous <Continuous>  dextrose 50% Injectable 25 Gram(s) IV Push once  dextrose 50% Injectable 25 Gram(s) IV Push once  dextrose 50% Injectable 12.5 Gram(s) IV Push once  dextrose Oral Gel 15 Gram(s) Oral once PRN  fluconAZOLE   40 mG/mL Suspension 200 milliGRAM(s) Enteral Tube every 24 hours  glucagon  Injectable 1 milliGRAM(s) IntraMuscular once  heparin   Injectable 5000 Unit(s) SubCutaneous every 12 hours  insulin glargine Injectable (LANTUS) 10 Unit(s) SubCutaneous at bedtime  insulin lispro (ADMELOG) corrective regimen sliding scale   SubCutaneous three times a day before meals  levETIRAcetam  Solution 500 milliGRAM(s) Oral two times a day  midodrine. 2.5 milliGRAM(s) Oral three times a day PRN  pantoprazole  Injectable 40 milliGRAM(s) IV Push every 12 hours  polyethylene glycol 3350 17 Gram(s) Oral daily  senna 2 Tablet(s) Oral at bedtime  silver sulfADIAZINE 1% Cream 1 Application(s) Topical every 12 hours  sodium chloride 0.9% Bolus. 100 milliLiter(s) IV Bolus every 5 minutes PRN      ***************************************************  RADIOLOGY & ADDITIONAL TESTS:    Imaging Personally Reviewed:  [ ] YES  [ ] NO    HEALTH ISSUES - PROBLEM Dx:  Sepsis    Acute UTI    History of intracranial hemorrhage    Palliative care by specialist    Anemia    MICHELLE (acute kidney injury)

## 2023-06-11 NOTE — CHART NOTE - NSCHARTNOTEFT_GEN_A_CORE
notified by RN for dark aspirate from PEG and concern for GIB    aspirate appears dark brown in color, not black. no melena as per RN.   reviewed documentation - pt documented to have been assessed by GI for possible GIB, aspirate noted as bile    Plan  - sample for occult blood  - 4p CBC and T&S  - Pantoprazole to IV BID notified by RN for dark aspirate from PEG and concern for GIB    aspirate appears dark brown in color, not black. no melena as per RN.   reviewed documentation - pt documented to have been assessed by GI for possible GIB, aspirate noted as bile  given tachycardia this am, increased concern for active GI bleed    Plan  - sample for occult blood  - 4p CBC and T&S  - Pantoprazole to IV BID  - Reassess vitals notified by RN for dark aspirate from PEG and concern for GIB    aspirate appears dark brown in color, not black. no melena as per RN.   reviewed documentation - pt documented to have been assessed by GI for possible GIB, aspirate noted as bile  given tachycardia this am, increased concern for active GI bleed    Plan  - sample for occult blood  - 4p CBC and T&S  - Pantoprazole to IV BID  - Reassess vitals    .  .  .    330pm   notified by RN of fever to 101.7 w /65, HR 95bpm, SaO2 100%    on review of vitals, last fever was 100.6 on 6/6/23  BCx during this admission neg   UA/UCx w evidence of candiduria and continues to be on diflucan therapy     Plan  #?GIB  - occult blood (+), will treat as GIB at this junction   - Pending 4pm CBC and T&S     #Fever  - Repeat BCx, exchange teixeira for repeat UA and UCx  - CXR notified by RN for dark aspirate from PEG and concern for GIB    aspirate appears dark brown in color, not black. no melena as per RN.   reviewed documentation - pt documented to have been assessed by GI for possible GIB, aspirate noted as bile  given tachycardia this am, increased concern for active GI bleed    Plan  - sample for occult blood  - 4p CBC and T&S  - Pantoprazole to IV BID  - Reassess vitals    .  .  .    330pm   notified by RN of fever to 101.7 w /65, HR 95bpm, SaO2 100%    on review of vitals, last fever was 100.6 on 6/6/23  BCx during this admission neg   UA/UCx w evidence of candiduria and continues to be on diflucan therapy     Plan  #?GIB  - occult blood (+), will treat as GIB at this junction   - 4pm CBC was 6.6 - ordered 1uPRBC to transfuse over 3h    #Fever  - Repeat BCx  - Due to edema, unable to exchange teixeira - requested RN to change teixeira bag and send UA/UCx from new bag  - CXR

## 2023-06-11 NOTE — PROGRESS NOTE ADULT - ASSESSMENT
Trach and Peg , CKD Patrick creatinine down to  2.3 , hemoglobin 2.6 , sat 46 , BUN down to 74 , continue follow

## 2023-06-11 NOTE — PROGRESS NOTE ADULT - ASSESSMENT
64 yo M with  PMH of ICH (2021) s/p trach and PEG, HTN, HLD, T2DM, CAD s/p stents, Recurrent C diff, BIBEMS from [facility] for abnormal labs. Patient non-verbal at baseline - limited history. Per NH chart  have a BUN greater than 200 and creatinine of 4.3 on outpatient labs. Outpatient CXR also w/ new L sided pleural effusion. In ED, patient was hypotensive with Afib with RVR, found to be in acure renal failure, anemic with elevated trops. He was started on Amiodarone GTT, PPI GTT, gastric lavage with coffee ground secretions, started on broad spectrum abx and admitted to MICU  While in MICU, found to be in DKA, s/p insulin drip, MICHELLE started on HD, acute anemia s/p PRBC transfusion, NSTEMI with downtrending trops, now downgraded to SDU    Acute Renal Failure, started on HD 6/1  Fluid Overload  HAGMA  - non oliguric, RBUS without hydro   - started on HD 6/1 via R IJ [place]   - Cr trending down >> 3.0 >> 1.9 >>2.3 today   - c/w bumex   - c/w sodium bicarb 1300mg PO TID  -  TTE 6/1 - EF 63%, GIDD     Candida tropicalis UTI  s/p cefepime and vanc   Blood clts neg, URine with Candida tropicalis   on  fluconazole, changed from IV to PO via gtube     Acute anemia  - HgB 6.7 (baseline 8) s/p 2U pRBC - improved to 9.9  - evaluated by GI - no evidence of GI bleed (PEG tube flushed with return of bile and no blood)  - Monitor H/H closely  - Active T+S    Hyponatremia, likely hypervolemic  - c/w bumex 2mg iv q12hr and HD per nephro  - daily BMP    Afib w/ RVR - now rate controlled   - currently rate controlled s/p amio gtt  - change amio to 200 q24H     CAD s/p stents  NSTEMI  - Trops 1.80-->1.63-->1.57-->1.55  - seen by cardio,  c/w asa and statin, medical management     DKA, resolved  - s/p insulin drip, now on basal/bolus and tube feeds. Monitor FS     Hx ICH s/p trach and PEG  Bedbound from NH   - c/w keppra 500mg BID for seizure prophylaxis    DNR only, overall prognosis is poor  Pending: HD per renal  Dispo: SNF with Vent and ? HD

## 2023-06-11 NOTE — PROGRESS NOTE ADULT - SUBJECTIVE AND OBJECTIVE BOX
CHELLE FOLLOW UP NOTE  --------------------------------------------------------------------------------  Chief Complaint:    24 hour events/subjective:        PAST HISTORY  --------------------------------------------------------------------------------  No significant changes to PMH, PSH, FHx, SHx, unless otherwise noted    ALLERGIES & MEDICATIONS  --------------------------------------------------------------------------------  Allergies    penicillin (Other)    Intolerances      Standing Inpatient Medications  albuterol/ipratropium for Nebulization 3 milliLiter(s) Nebulizer every 6 hours  aMIOdarone    Tablet   Oral   aMIOdarone    Tablet 200 milliGRAM(s) Oral daily  aspirin  chewable 81 milliGRAM(s) Oral daily  atorvastatin 80 milliGRAM(s) Oral at bedtime  buMETAnide Injectable 2 milliGRAM(s) IV Push every 12 hours  chlorhexidine 0.12% Liquid 15 milliLiter(s) Oral Mucosa two times a day  chlorhexidine 2% Cloths 1 Application(s) Topical <User Schedule>  dextrose 5%. 1000 milliLiter(s) IV Continuous <Continuous>  dextrose 5%. 1000 milliLiter(s) IV Continuous <Continuous>  dextrose 50% Injectable 25 Gram(s) IV Push once  dextrose 50% Injectable 25 Gram(s) IV Push once  dextrose 50% Injectable 12.5 Gram(s) IV Push once  fluconAZOLE   40 mG/mL Suspension 200 milliGRAM(s) Enteral Tube every 24 hours  glucagon  Injectable 1 milliGRAM(s) IntraMuscular once  heparin   Injectable 5000 Unit(s) SubCutaneous every 12 hours  insulin glargine Injectable (LANTUS) 10 Unit(s) SubCutaneous at bedtime  insulin lispro (ADMELOG) corrective regimen sliding scale   SubCutaneous three times a day before meals  levETIRAcetam  Solution 500 milliGRAM(s) Oral two times a day  pantoprazole    Tablet 40 milliGRAM(s) Oral before breakfast  polyethylene glycol 3350 17 Gram(s) Oral daily  senna 2 Tablet(s) Oral at bedtime  silver sulfADIAZINE 1% Cream 1 Application(s) Topical every 12 hours    PRN Inpatient Medications  acetaminophen     Tablet .. 650 milliGRAM(s) Oral every 6 hours PRN  dextrose Oral Gel 15 Gram(s) Oral once PRN  midodrine. 2.5 milliGRAM(s) Oral three times a day PRN  sodium chloride 0.9% Bolus. 100 milliLiter(s) IV Bolus every 5 minutes PRN      REVIEW OF SYSTEMS  --------------------------------------------------------------------------------  Gen: No weight changes, fatigue, fevers/chills, weakness  Skin: No rashes  Head/Eyes/Ears/Mouth: No headache; Normal hearing; Normal vision w/o blurriness; No sinus pain/discomfort, sore throat  Respiratory: No dyspnea, cough, wheezing, hemoptysis  CV: No chest pain, PND, orthopnea  GI: No abdominal pain, diarrhea, constipation, nausea, vomiting, melena, hematochezia  : No increased frequency, dysuria, hematuria, nocturia  MSK: No joint pain/swelling; no back pain; no edema  Neuro: No dizziness/lightheadedness, weakness, seizures, numbness, tingling  Heme: No easy bruising or bleeding  Endo: No heat/cold intolerance  Psych: No significant nervousness, anxiety, stress, depression    All other systems were reviewed and are negative, except as noted.    VITALS/PHYSICAL EXAM  --------------------------------------------------------------------------------  T(C): 35.6 (06-11-23 @ 04:45), Max: 37.2 (06-10-23 @ 13:30)  HR: 102 (06-11-23 @ 09:20) (73 - 103)  BP: 131/64 (06-11-23 @ 04:45) (118/56 - 131/64)  RR: --  SpO2: 100% (06-11-23 @ 09:20) (99% - 100%)  Wt(kg): --        06-10-23 @ 07:01  -  06-11-23 @ 07:00  --------------------------------------------------------  IN: 870 mL / OUT: 300 mL / NET: 570 mL      Physical Exam:  	Gen: NAD, well-appearing  	HEENT: PERRL, supple neck, clear oropharynx  	Pulm: CTA B/L  	CV: RRR, S1S2; no rub  	Back: No spinal or CVA tenderness; no sacral edema  	Abd: +BS, soft, nontender/nondistended  	: No suprapubic tenderness  	UE: Warm, FROM, no clubbing, intact strength; no edema; no asterixis  	LE: Warm, FROM, no clubbing, intact strength; no edema  	Neuro: No focal deficits, intact gait  	Psych: Normal affect and mood  	Skin: Warm, without rashes  	Vascular access:    LABS/STUDIES  --------------------------------------------------------------------------------              7.6    10.40 >-----------<  259      [06-10-23 @ 08:35]              23.9     131  |  96  |  72  ----------------------------<  164      [06-10-23 @ 18:42]  4.7   |  22  |  2.3        Ca     7.7     [06-10-23 @ 18:42]      Mg     2.2     [06-10-23 @ 08:35]      Phos  2.0     [06-09-23 @ 11:12]    TPro  4.4  /  Alb  1.7  /  TBili  0.3  /  DBili  x   /  AST  34  /  ALT  <5  /  AlkPhos  148  [06-10-23 @ 08:35]          Creatinine Trend:  SCr 2.3 [06-10 @ 18:42]  SCr 2.0 [06-10 @ 08:35]  SCr 1.9 [06-09 @ 18:08]  SCr 3.0 [06-09 @ 11:12]  SCr 3.0 [06-08 @ 16:00]    Urinalysis - [05-30-23 @ 21:15]      Color Jazmine / Appearance Turbid / SG 1.023 / pH 5.5      Gluc Negative / Ketone Trace  / Bili Negative / Urobili 6 mg/dL       Blood Moderate / Protein 100 mg/dL / Leuk Est Large / Nitrite Negative       /  / Hyaline 15 / Gran  / Sq Epi  / Non Sq Epi  / Bacteria Negative      Iron 51, TIBC 111, %sat 46      [06-06-23 @ 10:40]  Ferritin 1956      [06-06-23 @ 10:40]  Vitamin D (25OH) 70      [06-03-23 @ 06:50]    HBsAb <3.0      [06-01-23 @ 22:50]  HBsAb Nonreact      [06-01-23 @ 22:50]  HBsAg Nonreact      [06-01-23 @ 22:50]  HBcAb Nonreact      [06-01-23 @ 22:50]  HIV Nonreact      [06-08-23 @ 16:00]  HIV Nonreact      [06-08-23 @ 12:20]

## 2023-06-12 NOTE — PROGRESS NOTE ADULT - SUBJECTIVE AND OBJECTIVE BOX
Over Night Events: events noted, vent dependant, CBC noted, low grade T    PHYSICAL EXAM    ICU Vital Signs Last 24 Hrs  T(C): 37.2 (2023 05:24), Max: 38.7 (2023 13:30)  T(F): 99 (2023 05:24), Max: 101.7 (2023 13:30)  HR: 86 (2023 05:24) (86 - 103)  BP: 112/56 (2023 05:24) (102/55 - 120/65)  SpO2: 100% (2023 05:24) (100% - 100%)    O2 Parameters below as of 2023 01:15  Patient On (Oxygen Delivery Method): ventilator            General: ill looking  HEENT: trach  Lungs: Bl rhonchi  Cardiovascular: Regular   Abdomen: Soft, Positive BS  unresponsive      06-10-23 @ 07:01  -  23 @ 07:00  --------------------------------------------------------  IN:    Enteral Tube Flush: 120 mL    Peptamen A.F.: 750 mL  Total IN: 870 mL    OUT:    Voided (mL): 300 mL  Total OUT: 300 mL    Total NET: 570 mL      23 @ 07:01  -  23 @ 05:32  --------------------------------------------------------  IN:    Enteral Tube Flush: 120 mL    Peptamen A.F.: 750 mL  Total IN: 870 mL    OUT:  Total OUT: 0 mL    Total NET: 870 mL          LABS:                          6.6    12.54 )-----------( 249      ( 2023 19:18 )             21.4                                               06-10    131<L>  |  96<L>  |  72<HH>  ----------------------------<  164<H>  4.7   |  22  |  2.3<H>    Ca    7.7<L>      10 Deepak 2023 18:42  Mg     2.2     -10    TPro  4.4<L>  /  Alb  1.7<L>  /  TBili  0.3  /  DBili  x   /  AST  34  /  ALT  <5  /  AlkPhos  148<H>  06-10                                             Urinalysis Basic - ( 2023 18:25 )    Color: Yellow / Appearance: Slightly Turbid / S.016 / pH: x  Gluc: x / Ketone: Trace  / Bili: Small / Urobili: 6 mg/dL   Blood: x / Protein: 100 mg/dL / Nitrite: Negative   Leuk Esterase: Large / RBC: 72 /HPF /  /HPF   Sq Epi: x / Non Sq Epi: x / Bacteria: Few                                                  LIVER FUNCTIONS - ( 10 Deepak 2023 08:35 )  Alb: 1.7 g/dL / Pro: 4.4 g/dL / ALK PHOS: 148 U/L / ALT: <5 U/L / AST: 34 U/L / GGT: x                                                                                               Mode: AC/ CMV (Assist Control/ Continuous Mandatory Ventilation)  RR (machine): 14  TV (machine): 450  FiO2: 40  PEEP: 8  ITime: 0.8  MAP: 12  PIP: 23                                          MEDICATIONS  (STANDING):  albuterol/ipratropium for Nebulization 3 milliLiter(s) Nebulizer every 6 hours  aMIOdarone    Tablet   Oral   aMIOdarone    Tablet 200 milliGRAM(s) Oral daily  aspirin  chewable 81 milliGRAM(s) Oral daily  atorvastatin 80 milliGRAM(s) Oral at bedtime  buMETAnide Injectable 2 milliGRAM(s) IV Push every 12 hours  chlorhexidine 0.12% Liquid 15 milliLiter(s) Oral Mucosa two times a day  chlorhexidine 2% Cloths 1 Application(s) Topical <User Schedule>  dextrose 5%. 1000 milliLiter(s) (50 mL/Hr) IV Continuous <Continuous>  dextrose 5%. 1000 milliLiter(s) (100 mL/Hr) IV Continuous <Continuous>  dextrose 50% Injectable 25 Gram(s) IV Push once  dextrose 50% Injectable 25 Gram(s) IV Push once  dextrose 50% Injectable 12.5 Gram(s) IV Push once  fluconAZOLE   40 mG/mL Suspension 200 milliGRAM(s) Enteral Tube every 24 hours  glucagon  Injectable 1 milliGRAM(s) IntraMuscular once  heparin   Injectable 5000 Unit(s) SubCutaneous every 12 hours  insulin glargine Injectable (LANTUS) 10 Unit(s) SubCutaneous at bedtime  insulin lispro (ADMELOG) corrective regimen sliding scale   SubCutaneous three times a day before meals  levETIRAcetam  Solution 500 milliGRAM(s) Oral two times a day  pantoprazole  Injectable 40 milliGRAM(s) IV Push every 12 hours  polyethylene glycol 3350 17 Gram(s) Oral daily  senna 2 Tablet(s) Oral at bedtime  silver sulfADIAZINE 1% Cream 1 Application(s) Topical every 12 hours    MEDICATIONS  (PRN):  acetaminophen     Tablet .. 650 milliGRAM(s) Oral every 6 hours PRN Temp greater or equal to 38C (100.4F), Mild Pain (1 - 3)  dextrose Oral Gel 15 Gram(s) Oral once PRN Blood Glucose LESS THAN 70 milliGRAM(s)/deciliter  midodrine. 2.5 milliGRAM(s) Oral three times a day PRN SBP < 110  sodium chloride 0.9% Bolus. 100 milliLiter(s) IV Bolus every 5 minutes PRN SBP LESS THAN or EQUAL to 80 mmHg    CXR noted

## 2023-06-12 NOTE — PROGRESS NOTE ADULT - SUBJECTIVE AND OBJECTIVE BOX
Patient is a 63y old  Male who presents with a chief complaint of Abnormal labs (12 Jun 2023 08:37)      INTERVAL HPI/OVERNIGHT EVENTS:  None    T(C): 37.2 (06-12-23 @ 05:24), Max: 38.7 (06-11-23 @ 13:30)  HR: 105 (06-12-23 @ 08:30) (86 - 106)  BP: 151/78 (06-12-23 @ 08:25) (102/55 - 151/78)  RR: 18 (06-12-23 @ 08:30) (14 - 18)  SpO2: 100% (06-12-23 @ 08:30) (100% - 100%)  Wt(kg): --Vital Signs Last 24 Hrs  T(C): 37.2 (12 Jun 2023 05:24), Max: 38.7 (11 Jun 2023 13:30)  T(F): 99 (12 Jun 2023 05:24), Max: 101.7 (11 Jun 2023 13:30)  HR: 105 (12 Jun 2023 08:30) (86 - 106)  BP: 151/78 (12 Jun 2023 08:25) (102/55 - 151/78)  BP(mean): --  RR: 18 (12 Jun 2023 08:30) (14 - 18)  SpO2: 100% (12 Jun 2023 08:30) (100% - 100%)    Parameters below as of 12 Jun 2023 11:05  Patient On (Oxygen Delivery Method): ventilator    O2 Concentration (%): 40    PHYSICAL EXAM:  GENERAL: NAD, well-groomed, well-developed  HEAD:  Atraumatic, Normocephalic  EYES: EOMI, PERRLA, conjunctiva and sclera clear  ENMT: No tonsillar erythema, exudates, or enlargement; Moist mucous membranes, Good dentition, No lesions  NECK: Trach  NERVOUS SYSTEM:  Alert & Oriented X0  CHEST/LUNG: Clear to percussion bilaterally; No rales, rhonchi, wheezing, or rubs  HEART: Regular rate and rhythm; No murmurs, rubs, or gallops  ABDOMEN: PEG  EXTREMITIES:  2+ Peripheral Pulses, No clubbing, cyanosis, or edema  LYMPH: No lymphadenopathy noted  SKIN: No rashes or lesions      Culture - Sputum (collected 06-11-23 @ 18:28)  Source: Trach Asp Tracheal Aspirate  Gram Stain (06-12-23 @ 07:16):    Moderate polymorphonuclear leukocytes per low power field    Rare Squamous epithelial cells per low power field    Numerous Gram Negative Rods per oil power field    Moderate Gram Positive Rods per oil power field    Few Gram positive cocci in pairs per oil power field    acetaminophen     Tablet .. 650 milliGRAM(s) Oral every 6 hours PRN  albuterol/ipratropium for Nebulization 3 milliLiter(s) Nebulizer every 6 hours  aMIOdarone    Tablet 200 milliGRAM(s) Oral daily  aMIOdarone    Tablet   Oral   aspirin  chewable 81 milliGRAM(s) Oral daily  atorvastatin 80 milliGRAM(s) Oral at bedtime  buMETAnide Injectable 2 milliGRAM(s) IV Push every 12 hours  chlorhexidine 0.12% Liquid 15 milliLiter(s) Oral Mucosa two times a day  chlorhexidine 2% Cloths 1 Application(s) Topical <User Schedule>  dextrose 5%. 1000 milliLiter(s) IV Continuous <Continuous>  dextrose 5%. 1000 milliLiter(s) IV Continuous <Continuous>  dextrose 50% Injectable 25 Gram(s) IV Push once  dextrose 50% Injectable 25 Gram(s) IV Push once  dextrose 50% Injectable 12.5 Gram(s) IV Push once  dextrose Oral Gel 15 Gram(s) Oral once PRN  fluconAZOLE   40 mG/mL Suspension 200 milliGRAM(s) Enteral Tube every 24 hours  glucagon  Injectable 1 milliGRAM(s) IntraMuscular once  heparin   Injectable 5000 Unit(s) SubCutaneous every 12 hours  insulin glargine Injectable (LANTUS) 10 Unit(s) SubCutaneous at bedtime  insulin lispro (ADMELOG) corrective regimen sliding scale   SubCutaneous three times a day before meals  levETIRAcetam  Solution 500 milliGRAM(s) Oral two times a day  midodrine. 2.5 milliGRAM(s) Oral three times a day PRN  pantoprazole  Injectable 40 milliGRAM(s) IV Push every 12 hours  polyethylene glycol 3350 17 Gram(s) Oral daily  senna 2 Tablet(s) Oral at bedtime  silver sulfADIAZINE 1% Cream 1 Application(s) Topical every 12 hours  sodium chloride 0.9% Bolus. 100 milliLiter(s) IV Bolus every 5 minutes PRN    Mode: AC/ CMV (Assist Control/ Continuous Mandatory Ventilation), RR (machine): 14, TV (machine): 450, FiO2: 40, PEEP: 8, ITime: 0.8, MAP: 13, PIP: 24  HEALTH ISSUES - PROBLEM Dx:  Sepsis    Acute UTI    History of intracranial hemorrhage    Palliative care by specialist    Anemia    MICHELLE (acute kidney injury)

## 2023-06-12 NOTE — PROGRESS NOTE ADULT - ASSESSMENT
IMPRESSION:    Sepsis present on admission  Candida UTI  MICHELLE on CKD III on dialysis   NSTEMI likely type 2  Hyponatremia resolved   Paroxysmal Afib  Acute on chronic anemia suspected UGI Bleed  Chronic respiratory failure  H/o ICH s/p trach and PEG  H/o CAD      PLAN:    CNS: Avoid CNS depressants     HEENT: Oral and trach care    PULMONARY: HOB 45. Aspiration.  No vent changes.  Goal saturation 92-96%. Vent dependents.  Pulmonary toilet , not weanable, chest CT NC    CARDIOVASCULAR: Avoid overload.  Continue rate control     GI: PPI q 12    RENAL: trend CMP.  Correct as needed.  Nephrology following     INFECTIOUS DISEASE: per ID    HEMATOLOGICAL: DVT prophylaxis.  LE duplex negative.  Monitor CBC      ENDOCRINE: Follow up FS. Insulin protocol if needed.      DNR    Poor prognosis

## 2023-06-12 NOTE — PROGRESS NOTE ADULT - ASSESSMENT
62 yo M with  PMH of ICH (2021) s/p trach and PEG, HTN, HLD, T2DM, CAD s/p stents, Recurrent C diff, BIBEMS from [facility] for abnormal labs. Patient non-verbal at baseline - limited history. Per NH chart  have a BUN greater than 200 and creatinine of 4.3 on outpatient labs. Outpatient CXR also w/ new L sided pleural effusion. In ED, patient was hypotensive with Afib with RVR, found to be in acure renal failure, anemic with elevated trops. He was started on Amiodarone GTT, PPI GTT, gastric lavage with coffee ground secretions, started on broad spectrum abx and admitted to MICU  While in MICU, found to be in DKA, s/p insulin drip, MICHELLE started on HD, acute anemia s/p PRBC transfusion, NSTEMI with downtrending trops, now downgraded to SDU    Acute Renal Failure, started on HD 6/1  Fluid Overload  HAGMA  - non oliguric, RBUS without hydro   - started on HD 6/1 via R [facility] [place]   - Cr trending down >> 3.0 >> 1.9 >>2.3     trend Cr   - c/w bumex   - c/w sodium bicarb 1300mg PO TID  -  TTE 6/1 - EF 63%, GIDD     Candida tropicalis UTI  s/p cefepime and vanc   Blood clts neg, URine with Candida tropicalis   on  fluconazole, changed from IV to PO via gtube     Acute anemia  - HgB 6.7 (baseline 8) s/p 2U pRBC - improved to 9.9  - evaluated by GI - no evidence of GI bleed (PEG tube flushed with return of bile and no blood)  - Monitor H/H closely. Hb dropped to 6.6 yesterday >> trend CBC, Transfuse PRN, F/U GI  - Active T+S    Hyponatremia, likely hypervolemic  - c/w bumex 2mg iv q12hr and HD per nephro  - daily BMP    Afib w/ RVR - now rate controlled   - currently rate controlled s/p amio gtt  - currently on  amiodarone   200 q24H     CAD s/p stents  NSTEMI  - Trops 1.80-->1.63-->1.57-->1.55  - seen by cardio,  c/w asa and statin, medical management     DKA, resolved  - s/p insulin drip, now on basal/bolus and tube feeds. Monitor FS     Hx ICH s/p trach and PEG  Bedbound from NH   - c/w keppra 500mg BID for seizure prophylaxis    VDRF >> Pulmonary toileting.     DNR only, overall prognosis is poor  Pending: HD per renal/ GI for dropping Hh /   Dispo: SNF with Vent and ? HD

## 2023-06-12 NOTE — PROGRESS NOTE ADULT - ASSESSMENT
62 yo M with  PMH of ICH (2021) s/p trach and PEG, HTN, HLD, T2DM, CAD s/p stents, Recurrent C diff, BIBEMS from [facility] for abnormal labs. Patient non-verbal at baseline - limited history. Per NH chart  have a BUN greater than 200 and creatinine of 4.3 on outpatient labs. Outpatient CXR also w/ new L sided pleural effusion. With EMS patient was also found to be in irregular rhythm, no known history of A-fib or a flutter.  MICHELLE on CKD 3a - severe azotemia  likely due to GIB / hypotension  Started HD on 6/1/23  Acute anemia d/t UGIB  Troponemia  Lactic acidosis resolved   DKA resolved   Afib   - hd today / check daily labs / will assess daily need for HD   - continue bumex , document accurate UO   - Fever / patient has a udall/ check BC will give 1 dose of vanco / Id evaluation   - h/h noted / s/p blood tx check repeat   - BP noted / continue midodrine  5 q8   - last  phos noted / no binders   - very poor prognosis   will follow

## 2023-06-12 NOTE — PROGRESS NOTE ADULT - SUBJECTIVE AND OBJECTIVE BOX
seen and examined  24 h events noted         PAST HISTORY  --------------------------------------------------------------------------------  No significant changes to PMH, PSH, FHx, SHx, unless otherwise noted    ALLERGIES & MEDICATIONS  --------------------------------------------------------------------------------  Allergies    penicillin (Other)    Intolerances      Standing Inpatient Medications  albuterol/ipratropium for Nebulization 3 milliLiter(s) Nebulizer every 6 hours  aMIOdarone    Tablet 200 milliGRAM(s) Oral daily  aMIOdarone    Tablet   Oral   aspirin  chewable 81 milliGRAM(s) Oral daily  atorvastatin 80 milliGRAM(s) Oral at bedtime  buMETAnide Injectable 2 milliGRAM(s) IV Push every 12 hours  chlorhexidine 0.12% Liquid 15 milliLiter(s) Oral Mucosa two times a day  chlorhexidine 2% Cloths 1 Application(s) Topical <User Schedule>  dextrose 5%. 1000 milliLiter(s) IV Continuous <Continuous>  dextrose 5%. 1000 milliLiter(s) IV Continuous <Continuous>  dextrose 50% Injectable 25 Gram(s) IV Push once  dextrose 50% Injectable 25 Gram(s) IV Push once  dextrose 50% Injectable 12.5 Gram(s) IV Push once  fluconAZOLE   40 mG/mL Suspension 200 milliGRAM(s) Enteral Tube every 24 hours  glucagon  Injectable 1 milliGRAM(s) IntraMuscular once  heparin   Injectable 5000 Unit(s) SubCutaneous every 12 hours  insulin glargine Injectable (LANTUS) 10 Unit(s) SubCutaneous at bedtime  insulin lispro (ADMELOG) corrective regimen sliding scale   SubCutaneous three times a day before meals  levETIRAcetam  Solution 500 milliGRAM(s) Oral two times a day  pantoprazole  Injectable 40 milliGRAM(s) IV Push every 12 hours  polyethylene glycol 3350 17 Gram(s) Oral daily  senna 2 Tablet(s) Oral at bedtime  silver sulfADIAZINE 1% Cream 1 Application(s) Topical every 12 hours    PRN Inpatient Medications  acetaminophen     Tablet .. 650 milliGRAM(s) Oral every 6 hours PRN  dextrose Oral Gel 15 Gram(s) Oral once PRN  midodrine. 2.5 milliGRAM(s) Oral three times a day PRN  sodium chloride 0.9% Bolus. 100 milliLiter(s) IV Bolus every 5 minutes PRN        VITALS/PHYSICAL EXAM  --------------------------------------------------------------------------------  T(C): 37.2 (06-12-23 @ 05:24), Max: 38.7 (06-11-23 @ 13:30)  HR: 86 (06-12-23 @ 05:24) (86 - 103)  BP: 112/56 (06-12-23 @ 05:24) (102/55 - 120/65)  RR: --  SpO2: 100% (06-12-23 @ 05:24) (100% - 100%)  Wt(kg): --        06-11-23 @ 07:01  -  06-12-23 @ 07:00  --------------------------------------------------------  IN: 1305 mL / OUT: 0 mL / NET: 1305 mL      Physical Exam:  	Gen: trach/vent  	Pulm: B/L aline   	CV:  S1S2; no rub  	Abd: +distended  	LE:  edema  	Vascular access:    LABS/STUDIES  --------------------------------------------------------------------------------              6.6    12.54 >-----------<  249      [06-11-23 @ 19:18]              21.4     131  |  96  |  72  ----------------------------<  164      [06-10-23 @ 18:42]  4.7   |  22  |  2.3        Ca     7.7     [06-10-23 @ 18:42]        Creatinine Trend:  SCr 2.3 [06-10 @ 18:42]  SCr 2.0 [06-10 @ 08:35]  SCr 1.9 [06-09 @ 18:08]  SCr 3.0 [06-09 @ 11:12]  SCr 3.0 [06-08 @ 16:00]    Urinalysis - [06-11-23 @ 18:25]      Color Yellow / Appearance Slightly Turbid / SG 1.016 / pH 8.0      Gluc Negative / Ketone Trace  / Bili Small / Urobili 6 mg/dL       Blood Moderate / Protein 100 mg/dL / Leuk Est Large / Nitrite Negative      RBC 72 /  / Hyaline 12 / Gran  / Sq Epi  / Non Sq Epi  / Bacteria Few      Iron 51, TIBC 111, %sat 46      [06-06-23 @ 10:40]  Ferritin 1956      [06-06-23 @ 10:40]  Vitamin D (25OH) 70      [06-03-23 @ 06:50]    HBsAb <3.0      [06-01-23 @ 22:50]  HBsAb Nonreact      [06-01-23 @ 22:50]  HBsAg Nonreact      [06-01-23 @ 22:50]  HBcAb Nonreact      [06-01-23 @ 22:50]  HIV Nonreact      [06-08-23 @ 16:00]  HIV Nonreact      [06-08-23 @ 12:20]

## 2023-06-12 NOTE — PROGRESS NOTE ADULT - SUBJECTIVE AND OBJECTIVE BOX
VIJAYSTEPHONALICIA  63y  Male      Patient is a 63y old  Male who presents with a chief complaint of Abnormal labs       INTERVAL HPI/OVERNIGHT EVENTS:      ******************************* REVIEW OF SYSTEMS:**********************************************    All other review of systems negative    *********************** VITALS ******************************************    T(F): 100 (23 @ 13:30)  HR: 91 (23 @ 13:30) (86 - 112)  BP: 99/51 (23 @ 13:30) (99/51 - 167/83)  RR: 18 (23 @ 11:46) (14 - 18)  SpO2: 100% (23 @ 13:30) (100% - 100%)    23 @ 07:01  -  23 @ 07:00  --------------------------------------------------------  IN: 1305 mL / OUT: 0 mL / NET: 1305 mL    23 @ 07:01  -  23 @ 16:14  --------------------------------------------------------  IN: 375 mL / OUT: 1000 mL / NET: -625 mL            23 @ 07:01  -  23 @ 07:00  --------------------------------------------------------  IN: 1305 mL / OUT: 0 mL / NET: 1305 mL    23 @ 07:01  -  23 @ 16:14  --------------------------------------------------------  IN: 375 mL / OUT: 1000 mL / NET: -625 mL        ******************************** PHYSICAL EXAM:**************************************************  GENERAL: NAD    PSYCH: no agitation, baseline mentation  HEENT: vented thru trach     NERVOUS SYSTEM:  Alert & awake x2   PULMONARY: MICHAEL, CTA    CARDIOVASCULAR: S1S2 RRR    GI: Soft, NT, ND; BS present.    EXTREMITIES:  2+ Peripheral Pulses, No clubbing, cyanosis, or edema    LYMPH: No lymphadenopathy noted    SKIN: sacral decub     **************************** LABS *******************************************************                          6.6    12.54 )-----------( 249      ( 2023 19:18 )             21.4     06-10    131<L>  |  96<L>  |  72<HH>  ----------------------------<  164<H>  4.7   |  22  |  2.3<H>    Ca    7.7<L>      10 Deepak 2023 18:42  Mg     2.4             Urinalysis Basic - ( 2023 18:25 )    Color: Yellow / Appearance: Slightly Turbid / S.016 / pH: x  Gluc: x / Ketone: Trace  / Bili: Small / Urobili: 6 mg/dL   Blood: x / Protein: 100 mg/dL / Nitrite: Negative   Leuk Esterase: Large / RBC: 72 /HPF /  /HPF   Sq Epi: x / Non Sq Epi: x / Bacteria: Few        Lactate Trend        CAPILLARY BLOOD GLUCOSE      POCT Blood Glucose.: 127 mg/dL (2023 12:03)      Culture - Sputum (collected 23 @ 18:28)  Source: Trach Asp Tracheal Aspirate  Gram Stain (23 @ 07:16):    Moderate polymorphonuclear leukocytes per low power field    Rare Squamous epithelial cells per low power field    Numerous Gram Negative Rods per oil power field    Moderate Gram Positive Rods per oil power field    Few Gram positive cocci in pairs per oil power field        Culture - Sputum (collected 23 @ 18:28)  Source: Trach Asp Tracheal Aspirate  Gram Stain (23 @ 07:16):    Moderate polymorphonuclear leukocytes per low power field    Rare Squamous epithelial cells per low power field    Numerous Gram Negative Rods per oil power field    Moderate Gram Positive Rods per oil power field    Few Gram positive cocci in pairs per oil power field        **************************Active Medications *******************************************  penicillin (Other)      acetaminophen     Tablet .. 650 milliGRAM(s) Oral every 6 hours PRN  albuterol/ipratropium for Nebulization 3 milliLiter(s) Nebulizer every 6 hours  aMIOdarone    Tablet 200 milliGRAM(s) Oral daily  aMIOdarone    Tablet   Oral   aspirin  chewable 81 milliGRAM(s) Oral daily  atorvastatin 80 milliGRAM(s) Oral at bedtime  buMETAnide Injectable 2 milliGRAM(s) IV Push every 12 hours  chlorhexidine 0.12% Liquid 15 milliLiter(s) Oral Mucosa two times a day  chlorhexidine 2% Cloths 1 Application(s) Topical <User Schedule>  dextrose 5%. 1000 milliLiter(s) IV Continuous <Continuous>  dextrose 5%. 1000 milliLiter(s) IV Continuous <Continuous>  dextrose 50% Injectable 12.5 Gram(s) IV Push once  dextrose 50% Injectable 25 Gram(s) IV Push once  dextrose 50% Injectable 25 Gram(s) IV Push once  dextrose Oral Gel 15 Gram(s) Oral once PRN  diltiazem    milliGRAM(s) Oral daily  fluconAZOLE   40 mG/mL Suspension 200 milliGRAM(s) Enteral Tube every 24 hours  glucagon  Injectable 1 milliGRAM(s) IntraMuscular once  heparin   Injectable 5000 Unit(s) SubCutaneous every 12 hours  insulin glargine Injectable (LANTUS) 10 Unit(s) SubCutaneous at bedtime  insulin lispro (ADMELOG) corrective regimen sliding scale   SubCutaneous three times a day before meals  levETIRAcetam  Solution 500 milliGRAM(s) Oral two times a day  midodrine. 2.5 milliGRAM(s) Oral three times a day PRN  pantoprazole  Injectable 40 milliGRAM(s) IV Push every 12 hours  polyethylene glycol 3350 17 Gram(s) Oral daily  senna 2 Tablet(s) Oral at bedtime  silver sulfADIAZINE 1% Cream 1 Application(s) Topical every 12 hours  sodium chloride 0.9% Bolus. 100 milliLiter(s) IV Bolus every 5 minutes PRN      ***************************************************  RADIOLOGY & ADDITIONAL TESTS:    Imaging Personally Reviewed:  [ ] YES  [ ] NO    HEALTH ISSUES - PROBLEM Dx:  Sepsis    Acute UTI    History of intracranial hemorrhage    Palliative care by specialist    Anemia    MICHELLE (acute kidney injury)

## 2023-06-12 NOTE — PROGRESS NOTE ADULT - ASSESSMENT
Acute Renal Failure, started on HD 6/1  Fluid Overload  HAGMA  - non oliguric, RBUS without hydro   - started on HD 6/1 via R [facility] [place]   - Cr much improved s/p HD yesterday, now 1.9  f/u Nephro recs  - c/w bumex 2mg iv q12hr, HD per renal--> HD today   - c/w midodrine 10mg q8hr  - c/w sodium bicarb 1300mg PO TID  -  TTE 6/1 - EF 63%, GIDD   - nephrology following   - HD today    Candida tropicalis UTI  s/p cefepime and vanc   Blood clts neg, URine with Candida tropicalis   on  fluconazole, ID following can change from IV to PO via gtube     Acute anemia  - HgB 6.7 (baseline 8) s/p 2U pRBC - improved to 9.9  - dropped again - sp total of 4 units  - evaluated by GI - no evidence of GI bleed (PEG tube flushed with return of bile and no blood)  - Monitor H/H closely  - Active T+S    Hyponatremia, likely hypervolemic  - c/w bumex 2mg iv q12hr and HD per nephro  - daily BMP    Afib w/ RVR - now rate controlled   - currently rate controlled s/p amio gtt  - change amio to 200 q24H     CAD s/p stents  NSTEMI  - Trops 1.80-->1.63-->1.57-->1.55  - seen by cardio,  c/w asa and statin, medical management     DKA, resolved  - s/p insulin drip, now on basal/bolus and tube feeds. Monitor FS     Hx ICH s/p trach and PEG  Bedbound from NH   - c/w keppra 500mg BID for seizure prophylaxis    DNR only, overall prognosis is poor

## 2023-06-13 NOTE — PROGRESS NOTE ADULT - SUBJECTIVE AND OBJECTIVE BOX
ALICIA BARBOUR  63y  Male      Patient is a 63y old  Male who presents with a chief complaint of Abnormal labs       INTERVAL HPI/OVERNIGHT EVENTS:      ******************************* REVIEW OF SYSTEMS:**********************************************    All other review of systems negative    *********************** VITALS ******************************************    T(F): 98.6 (23 @ 12:54)  HR: 81 (23 @ 12:54) (80 - 91)  BP: 93/50 (23 @ 12:54) (86/52 - 101/56)  RR: 18 (23 @ 12:54) (18 - 19)  SpO2: 99% (23 @ 12:54) (99% - 100%)    23 @ 07:01  -  23 @ 07:00  --------------------------------------------------------  IN: 375 mL / OUT: 1000 mL / NET: -625 mL            23 @ 07:01  -  23 @ 07:00  --------------------------------------------------------  IN: 375 mL / OUT: 1000 mL / NET: -625 mL        ******************************** PHYSICAL EXAM:**************************************************  GENERAL: NAD    PSYCH: no agitation,   HEENT: Vented thru trach    NERVOUS SYSTEM:  Alert & AWAKE x  1-2   PULMONARY: MICHAEL, CTA    CARDIOVASCULAR: S1S2 RRR    GI: Soft, NT, ND; BS present.    EXTREMITIES:  2+ Peripheral  edema    LYMPH: No lymphadenopathy noted    SKIN: decubitus       **************************** LABS *******************************************************                          6.6    12.54 )-----------( 249      ( 2023 19:18 )             21.4     06-12    134<L>  |  98  |  58<H>  ----------------------------<  151<H>  5.1<H>   |  24  |  2.0<H>    Ca    7.7<L>      2023 18:14  Mg     2.4     06-12        Urinalysis Basic - ( 2023 18:25 )    Color: Yellow / Appearance: Slightly Turbid / S.016 / pH: x  Gluc: x / Ketone: Trace  / Bili: Small / Urobili: 6 mg/dL   Blood: x / Protein: 100 mg/dL / Nitrite: Negative   Leuk Esterase: Large / RBC: 72 /HPF /  /HPF   Sq Epi: x / Non Sq Epi: x / Bacteria: Few        Lactate Trend        CAPILLARY BLOOD GLUCOSE      POCT Blood Glucose.: 175 mg/dL (2023 11:06)          **************************Active Medications *******************************************  penicillin (Other)      acetaminophen     Tablet .. 650 milliGRAM(s) Oral every 6 hours PRN  acetylcysteine 20%  Inhalation 4 milliLiter(s) Inhalation every 6 hours  albuterol/ipratropium for Nebulization 3 milliLiter(s) Nebulizer every 6 hours  aMIOdarone    Tablet 200 milliGRAM(s) Oral daily  aMIOdarone    Tablet   Oral   aspirin  chewable 81 milliGRAM(s) Oral daily  atorvastatin 80 milliGRAM(s) Oral at bedtime  buMETAnide Injectable 2 milliGRAM(s) IV Push every 12 hours  chlorhexidine 0.12% Liquid 15 milliLiter(s) Oral Mucosa two times a day  chlorhexidine 2% Cloths 1 Application(s) Topical <User Schedule>  dextrose 5%. 1000 milliLiter(s) IV Continuous <Continuous>  dextrose 5%. 1000 milliLiter(s) IV Continuous <Continuous>  dextrose 50% Injectable 25 Gram(s) IV Push once  dextrose 50% Injectable 25 Gram(s) IV Push once  dextrose 50% Injectable 12.5 Gram(s) IV Push once  dextrose Oral Gel 15 Gram(s) Oral once PRN  diltiazem    milliGRAM(s) Oral daily  fluconAZOLE   40 mG/mL Suspension 200 milliGRAM(s) Enteral Tube every 24 hours  glucagon  Injectable 1 milliGRAM(s) IntraMuscular once  heparin   Injectable 5000 Unit(s) SubCutaneous every 12 hours  insulin glargine Injectable (LANTUS) 10 Unit(s) SubCutaneous at bedtime  insulin lispro (ADMELOG) corrective regimen sliding scale   SubCutaneous three times a day before meals  levETIRAcetam  Solution 500 milliGRAM(s) Oral two times a day  midodrine. 2.5 milliGRAM(s) Oral three times a day PRN  pantoprazole  Injectable 40 milliGRAM(s) IV Push every 12 hours  polyethylene glycol 3350 17 Gram(s) Oral daily  senna 2 Tablet(s) Oral at bedtime  silver sulfADIAZINE 1% Cream 1 Application(s) Topical every 12 hours  sodium chloride 0.9% Bolus. 100 milliLiter(s) IV Bolus every 5 minutes PRN      ***************************************************  RADIOLOGY & ADDITIONAL TESTS:    Imaging Personally Reviewed:  [ ] YES  [ ] NO    HEALTH ISSUES - PROBLEM Dx:  Sepsis    Acute UTI    History of intracranial hemorrhage    Palliative care by specialist    Anemia    MICHELLE (acute kidney injury)

## 2023-06-13 NOTE — PROGRESS NOTE ADULT - ASSESSMENT
64 yo M with  PMH of ICH (2021) s/p trach and PEG, HTN, HLD, T2DM, CAD s/p stents, Recurrent C diff, BIBEMS from [facility] for abnormal labs. Patient non-verbal at baseline - limited history. Per NH chart  have a BUN greater than 200 and creatinine of 4.3 on outpatient labs. Outpatient CXR also w/ new L sided pleural effusion. With EMS patient was also found to be in irregular rhythm, no known history of A-fib or a flutter.  MICHELLE on CKD 3a - severe azotemia  likely due to GIB / hypotension  Started HD on 6/1/23  Acute anemia d/t UGIB  Troponemia  Lactic acidosis resolved   DKA resolved   Afib   - s/p hd yesterday / check daily labs    - continue bumex , document accurate UO   - Fever  / patient has a udall/ BC negative 6/11  - h/h noted / s/p blood tx check repeat   - BP noted /  midodrine  5 q8   - last  phos noted / no binders   - if cr not improving will need TDC   - very poor prognosis   will follow

## 2023-06-13 NOTE — PROGRESS NOTE ADULT - ASSESSMENT
63 year old man with history of ICH s/p trach and PEG in 2021, CAD, recurrent CDIff brought in for elevated BUN/creatinine now on dialysis. Palliative care reconsulted for GOC.     Patient seen at bedide, unable to participate in exam. Per primary team discussion with patient's wife, she is considering comfort measures only but wishes to speak with palliative care and nephrology first. Called and left message.     Education about palliative care provided to patient/family.  See Recs below.    Please call [phone] with questions or concerns 24/7.   We will continue to follow.

## 2023-06-13 NOTE — PROGRESS NOTE ADULT - ASSESSMENT
IMPRESSION:    Sepsis present on admission  Candida UTI  pseudomonas pna  MICHELLE on CKD III on dialysis   NSTEMI likely type 2  Hyponatremia resolved   Paroxysmal Afib  Acute on chronic anemia suspected UGI Bleed  Chronic respiratory failure  H/o ICH s/p trach and PEG  H/o CAD      PLAN:    CNS: Avoid CNS depressants     HEENT: Oral and trach care    PULMONARY: HOB 45. Aspiration.  No vent changes.  Goal saturation 92-96%. Vent dependents.  Pulmonary toilet , not weanable, chest CT NC/ ? loculated l effusion    CARDIOVASCULAR: Avoid overload.  Continue rate control     GI: PPI q 12    RENAL: trend CMP.  Correct as needed.  Nephrology following     INFECTIOUS DISEASE: per ID    HEMATOLOGICAL: DVT prophylaxis.  LE duplex negative.  Monitor CBC      ENDOCRINE: Follow up FS. Insulin protocol if needed.      DNR    Poor prognosis

## 2023-06-13 NOTE — PROGRESS NOTE ADULT - SUBJECTIVE AND OBJECTIVE BOX
Over Night Events: events noted, vent dependant, low grade T    PHYSICAL EXAM    ICU Vital Signs Last 24 Hrs  T(C): 37.5 (2023 00:00), Max: 37.9 (2023 20:28)  T(F): 99.5 (2023 00:00), Max: 100.2 (:28)  HR: 88 (2023 00:00) (88 - 112)  BP: 100/56 (2023 00:00) (86/52 - 167/83)  RR: 19 (:28) (14 - 19)  SpO2: 100% (:) (100% - 100%)    O2 Parameters below as of :  Patient On (Oxygen Delivery Method): ventilator            General: ILL looking  HEENT: trach            Lungs: Bilateral rhonchi  Cardiovascular: Regular   Abdomen: Soft, Positive BS  Not following commands      23 @ 07:01  -  23 @ 07:00  --------------------------------------------------------  IN:    Enteral Tube Flush: 180 mL    Peptamen A.F.: 1125 mL  Total IN: 1305 mL    OUT:  Total OUT: 0 mL    Total NET: 1305 mL      23 @ 07:01  -  23 @ 06:07  --------------------------------------------------------  IN:    Peptamen A.F.: 375 mL  Total IN: 375 mL    OUT:    Other (mL): 1000 mL  Total OUT: 1000 mL    Total NET: -625 mL          LABS:                          6.6    12.54 )-----------( 249      ( 2023 19:18 )             21.4                                               06-12    134<L>  |  98  |  58<H>  ----------------------------<  151<H>  5.1<H>   |  24  |  2.0<H>    Ca    7.7<L>      2023 18:14  Mg     2.4     06-12                                               Urinalysis Basic - ( 2023 18:25 )    Color: Yellow / Appearance: Slightly Turbid / S.016 / pH: x  Gluc: x / Ketone: Trace  / Bili: Small / Urobili: 6 mg/dL   Blood: x / Protein: 100 mg/dL / Nitrite: Negative   Leuk Esterase: Large / RBC: 72 /HPF /  /HPF   Sq Epi: x / Non Sq Epi: x / Bacteria: Few                                                                                           Culture - Blood (collected 2023 19:18)  Source: .Blood Blood  Preliminary Report (2023 23:02):    No growth to date.    Culture - Sputum (collected 2023 18:28)  Source: Trach Asp Tracheal Aspirate  Gram Stain (2023 07:16):    Moderate polymorphonuclear leukocytes per low power field    Rare Squamous epithelial cells per low power field    Numerous Gram Negative Rods per oil power field    Moderate Gram Positive Rods per oil power field    Few Gram positive cocci in pairs per oil power field  Preliminary Report (2023 20:48):    Three or more mixed gram negative rods including    Moderate Pseudomonas aeruginosa    Normal Respiratory Susan present                                                   Mode: AC/ CMV (Assist Control/ Continuous Mandatory Ventilation)  RR (machine): 14  TV (machine): 450  FiO2: 40  PEEP: 8  ITime: 0.8  MAP: 13  PIP: 19                                          MEDICATIONS  (STANDING):  albuterol/ipratropium for Nebulization 3 milliLiter(s) Nebulizer every 6 hours  aMIOdarone    Tablet 200 milliGRAM(s) Oral daily  aMIOdarone    Tablet   Oral   aspirin  chewable 81 milliGRAM(s) Oral daily  atorvastatin 80 milliGRAM(s) Oral at bedtime  buMETAnide Injectable 2 milliGRAM(s) IV Push every 12 hours  chlorhexidine 0.12% Liquid 15 milliLiter(s) Oral Mucosa two times a day  chlorhexidine 2% Cloths 1 Application(s) Topical <User Schedule>  dextrose 5%. 1000 milliLiter(s) (50 mL/Hr) IV Continuous <Continuous>  dextrose 5%. 1000 milliLiter(s) (100 mL/Hr) IV Continuous <Continuous>  dextrose 50% Injectable 25 Gram(s) IV Push once  dextrose 50% Injectable 25 Gram(s) IV Push once  dextrose 50% Injectable 12.5 Gram(s) IV Push once  diltiazem    milliGRAM(s) Oral daily  fluconAZOLE   40 mG/mL Suspension 200 milliGRAM(s) Enteral Tube every 24 hours  glucagon  Injectable 1 milliGRAM(s) IntraMuscular once  heparin   Injectable 5000 Unit(s) SubCutaneous every 12 hours  insulin glargine Injectable (LANTUS) 10 Unit(s) SubCutaneous at bedtime  insulin lispro (ADMELOG) corrective regimen sliding scale   SubCutaneous three times a day before meals  levETIRAcetam  Solution 500 milliGRAM(s) Oral two times a day  pantoprazole  Injectable 40 milliGRAM(s) IV Push every 12 hours  polyethylene glycol 3350 17 Gram(s) Oral daily  senna 2 Tablet(s) Oral at bedtime  silver sulfADIAZINE 1% Cream 1 Application(s) Topical every 12 hours    MEDICATIONS  (PRN):  acetaminophen     Tablet .. 650 milliGRAM(s) Oral every 6 hours PRN Temp greater or equal to 38C (100.4F), Mild Pain (1 - 3)  dextrose Oral Gel 15 Gram(s) Oral once PRN Blood Glucose LESS THAN 70 milliGRAM(s)/deciliter  midodrine. 2.5 milliGRAM(s) Oral three times a day PRN SBP < 110  sodium chloride 0.9% Bolus. 100 milliLiter(s) IV Bolus every 5 minutes PRN SBP LESS THAN or EQUAL to 80 mmHg

## 2023-06-13 NOTE — PROGRESS NOTE ADULT - SUBJECTIVE AND OBJECTIVE BOX
HPI:  64 yo M with  PMH of ICH () s/p trach and PEG, HTN, HLD, T2DM, CAD s/p stents, Recurrent C diff, BIBEMS from [facility] for abnormal labs. Patient non-verbal at baseline - limited history. Per NH chart  have a BUN greater than 200 and creatinine of 4.3 on outpatient labs. Outpatient CXR also w/ new L sided pleural effusion. With EMS patient was also found to be in irregular rhythm, no known history of A-fib or a flutter.  In ED patient had borderline BP, and was in Atrial fibrillation. He was started on amiodarone gtt. Labs showed , Cr. 4.1, Hb 6.6. Trops 1.8. He was given 1U PRBC, gastric lavage revealed coffee ground emesis w/no active bleeding. He was started on PPI drip and GI consulted in ED. He was given IV aztreonam and vancomycin.   Patient being admitted to ICU for management of Sepsis likely from UTI, MICHELLE likely prerenal and NSTEMI.     Interval History  -Patient seen at bedside  -He was unable to participate in exam    ADVANCE DIRECTIVES:    [ ] Full Code [x ] DNR [ ] MOLST [ ] Living Will     DECISION MAKER(s):  [ ] Health Care Proxy(s)  [x ] Surrogate(s)  [ ] Guardian           Name(s): Phone Number(s): Wife Sandrita Bauer    BASELINE (I)ADL(s) (prior to admission):  Harvey: [ ]Total  [ ] Moderate [ ]Dependent  Palliative Performance Status Version 2:    20     %    http://npcrc.org/files/news/palliative_performance_scale_ppsv2.pdf    Allergies  penicillin (Other)    Intolerances    MEDICATIONS  (STANDING):  acetylcysteine 20%  Inhalation 4 milliLiter(s) Inhalation every 6 hours  albuterol/ipratropium for Nebulization 3 milliLiter(s) Nebulizer every 6 hours  aMIOdarone    Tablet 200 milliGRAM(s) Oral daily  aMIOdarone    Tablet   Oral   aspirin  chewable 81 milliGRAM(s) Oral daily  atorvastatin 80 milliGRAM(s) Oral at bedtime  buMETAnide Injectable 2 milliGRAM(s) IV Push every 12 hours  chlorhexidine 0.12% Liquid 15 milliLiter(s) Oral Mucosa two times a day  chlorhexidine 2% Cloths 1 Application(s) Topical <User Schedule>  dextrose 5%. 1000 milliLiter(s) (100 mL/Hr) IV Continuous <Continuous>  dextrose 5%. 1000 milliLiter(s) (50 mL/Hr) IV Continuous <Continuous>  dextrose 50% Injectable 25 Gram(s) IV Push once  dextrose 50% Injectable 25 Gram(s) IV Push once  dextrose 50% Injectable 12.5 Gram(s) IV Push once  diltiazem    milliGRAM(s) Oral daily  fluconAZOLE   40 mG/mL Suspension 200 milliGRAM(s) Enteral Tube every 24 hours  glucagon  Injectable 1 milliGRAM(s) IntraMuscular once  heparin   Injectable 5000 Unit(s) SubCutaneous every 12 hours  insulin glargine Injectable (LANTUS) 10 Unit(s) SubCutaneous at bedtime  insulin lispro (ADMELOG) corrective regimen sliding scale   SubCutaneous three times a day before meals  levETIRAcetam  Solution 500 milliGRAM(s) Oral two times a day  pantoprazole  Injectable 40 milliGRAM(s) IV Push every 12 hours  polyethylene glycol 3350 17 Gram(s) Oral daily  senna 2 Tablet(s) Oral at bedtime  silver sulfADIAZINE 1% Cream 1 Application(s) Topical every 12 hours    MEDICATIONS  (PRN):  acetaminophen     Tablet .. 650 milliGRAM(s) Oral every 6 hours PRN Temp greater or equal to 38C (100.4F), Mild Pain (1 - 3)  dextrose Oral Gel 15 Gram(s) Oral once PRN Blood Glucose LESS THAN 70 milliGRAM(s)/deciliter  midodrine. 2.5 milliGRAM(s) Oral three times a day PRN SBP < 110  sodium chloride 0.9% Bolus. 100 milliLiter(s) IV Bolus every 5 minutes PRN SBP LESS THAN or EQUAL to 80 mmHg      PRESENT SYMPTOMS: [x ]Unable to obtain due to poor mentation   Source if other than patient:  [ ]Family   [ ]Team     Pain: [ ]yes [ ]no  QOL impact -   Location -                    Aggravating factors -  Quality -  Radiation -  Timing-  Severity (0-10 scale):  Minimal acceptable level (0-10 scale):     CPOT:  0  https://www.[facility].org/getattachment/[email or web address]-[email or web address]-1974g8380d9s/Critical-Care-Pain-Observation-Tool-(CPOT)    PAIN AD Score:   http://geriatrictoolkit.[facility]/cog/painad.pdf (press ctrl +  left click to view)    Dyspnea:                           [ ]None[ ]Mild [ ]Moderate [ ]Severe     Respiratory Distress Observation Scale (RDOS): 0  A score of 0 to 2 signifies little or no respiratory distress, 3 signifies mild distress, scores 4 to 6 indicate moderate distress, and scores greater than 7 signify severe distress  [email or web address]://[email or web address]/sites/default/files/PDFS/382499-zukdkhauzay-omwlyudt-bmnvpfstprm-owezl.pdf    Anxiety:                             [ ]None[ ]Mild [ ]Moderate [ ]Severe   Fatigue:                             [ ]None[ ]Mild [ ]Moderate [ ]Severe   Nausea:                             [ ]None[ ]Mild [ ]Moderate [ ]Severe   Loss of appetite:              [ ]None[ ]Mild [ ]Moderate [ ]Severe   Constipation:                    [ ]None[ ]Mild [ ]Moderate [ ]Severe    Other Symptoms:  [ ]All other review of systems negative     Palliative Performance Status Version 2:   10      %    http://npcrc.org/files/news/palliative_performance_scale_ppsv2.pdf    PHYSICAL EXAM:  Vital Signs Last 24 Hrs  T(C): 37 (2023 12:54), Max: 37.9 (2023 20:28)  T(F): 98.6 (2023 12:54), Max: 100.2 (2023 20:28)  HR: 81 (2023 12:54) (80 - 91)  BP: 93/50 (2023 12:54) (86/52 - 101/56)  BP(mean): --  RR: 18 (2023 12:54) (18 - 19)  SpO2: 99% (2023 12:54) (99% - 100%)    Parameters below as of 2023 12:54  Patient On (Oxygen Delivery Method): ventilator    GENERAL:  [ ] No acute distress [ ]Lethargic  [x ]Unarousable  [ ]Verbal  [ ]Non-Verbal [ ]Cachexia    BEHAVIORAL/PSYCH:  [ ]Alert and Oriented x  [ ] Anxiety [ ] Delirium [ ] Agitation [x ] Calm   EYES: [ ] No scleral icterus [ ] Scleral icterus [ x] Closed  ENMT:  [ ]Dry mouth  [ x]No external oral lesions [ ] No external ear or nose lesions  CARDIOVASCULAR:  [ ]Regular [ ]Irregular [ ]Tachy [x ]Not Tachy  [ ]Bridger [ ] Edema [ ] No edema  PULMONARY:  [ ]Tachypnea  [ ]Audible excessive secretions [x ] No labored breathing [ ] mildly labored breathing [ ] labored breathing  GASTROINTESTINAL: [ ]Soft  [ ]Distended  [x ]Not distended [ ]Non tender [ ]Tender  MUSCULOSKELETAL: [ ]No clubbing [ ] clubbing  [x ] No cyanosis [ ] cyanosis  NEUROLOGIC: [ ]No focal deficits  [ ]Follows commands  [x ]Does not follow commands  [ ]Cognitive impairment  [ ]Dysphagia  [ ]Dysarthria  [ ]Paresis   SKIN: [ ] Jaundiced [ ] Non-jaundiced [ ]Rash [ ]No Rash [ ] Warm [ ] Dry  MISC/LINES: [ ] ET tube [ ] Trach [ ]NGT/OGT [x ]PEG [x ]Barney      LABS: reviewed by me                                   6.6    1254 )-----------( 249      ( 2023 19:18 )             21.4       06-12    134<L>  |  98  |  58<H>  ----------------------------<  151<H>  5.1<H>   |  24  |  2.0<H>    Ca    7.7<L>      2023 18:14  Mg     2.4     06-12                Urinalysis Basic - ( 2023 18:25 )    Color: Yellow / Appearance: Slightly Turbid / S.016 / pH: x  Gluc: x / Ketone: Trace  / Bili: Small / Urobili: 6 mg/dL   Blood: x / Protein: 100 mg/dL / Nitrite: Negative   Leuk Esterase: Large / RBC: 72 /HPF /  /HPF   Sq Epi: x / Non Sq Epi: x / Bacteria: Few                  CAPILLARY BLOOD GLUCOSE      POCT Blood Glucose.: 175 mg/dL (2023 11:06)            RADIOLOGY & ADDITIONAL STUDIES: reviewed by me    < from: Xray Chest 1 View- PORTABLE-Urgent (Xray Chest 1 View- PORTABLE-Urgent .) (23 @ 10:50) >  Impression:    Further significant increase in left-sided opacities/effusion with   minimal aeration of the left lung. Right-sided opacities redemonstrated    < end of copied text >    < from: 12 Lead ECG (23 @ 13:30) >  Ventricular Rate 103 BPM    Atrial Rate 103 BPM    P-R Interval 120 ms    QRS Duration 64 ms    Q-T Interval 322 ms    QTC Calculation(Bazett) 421 ms    P Axis 47 degrees    R Axis 23 degrees    T Axis 26 degrees    Diagnosis Line Sinus tachycardia  Low voltage QRS  Borderline ECG    < end of copied text >      PROTEIN CALORIE MALNUTRITION PRESENT: [ ]mild [ ]moderate [ ]severe [ ]underweight [ ]morbid obesity  https://www.andeal.org/vault/2440/web/files/ONC/Table_Clinical%20Characteristics%20to%20Document%20Malnutrition-White%20JV%20et%20al%316570.pdf    Height (cm): 170.2 (23 @ 09:07), 180.3 (10-04-22 @ 22:39)  Weight (kg): 102 (23 @ 06:26), 66.4 (10-04-22 @ 22:39)  BMI (kg/m2): 35.2 (23 @ 09:07), 31.4 (23 @ 06:26), 20.4 (10-04-22 @ 22:39)    [ ]PPSV2 < or = to 30% [ ]significant weight loss  [ ]poor nutritional intake  [ ]anasarca      [ ]Artificial Nutrition      Patient and/or family assessed for spiritual and social needs     REFERRALS:   [ ]Chaplaincy  [ ]Hospice  [ ]Child Life  [x ]Social Work  [ ]Case management [ ]Holistic Therapy     Patient discussed with primary medical team MD  Palliative care education provided to patient and/or family

## 2023-06-13 NOTE — PROGRESS NOTE ADULT - ASSESSMENT
64 yo M with  PMH of ICH (2021) s/p trach and PEG, HTN, HLD, T2DM, CAD s/p stents, Recurrent C diff, BIBEMS from [facility] for abnormal labs. Patient non-verbal at baseline - limited history. Per NH chart  have a BUN greater than 200 and creatinine of 4.3 on outpatient labs. Outpatient CXR also w/ new L sided pleural effusion. In ED, patient was hypotensive with Afib with RVR, found to be in acure renal failure, anemic with elevated trops. He was started on Amiodarone GTT, PPI GTT, gastric lavage with coffee ground secretions, started on broad spectrum abx and admitted to MICU  While in MICU, found to be in DKA, s/p insulin drip, MICHELLE started on HD, acute anemia s/p PRBC transfusion, NSTEMI with downtrending trops, now downgraded to SDU    Acute Renal Failure, started on HD 6/1  Fluid Overload  HAGMA  - non oliguric, RBUS without hydro   - started on HD 6/1 via R IJ [place]   - Cr trending down >> 3.0 >> 1.9 >>2.3 >> 2.0      trend Cr   - c/w bumex   - c/w sodium bicarb 1300mg PO TID  -  TTE 6/1 - EF 63%, GIDD     Candida tropicalis UTI  s/p cefepime and vanc   Blood clts neg, URine with Candida tropicalis   on  fluconazole, changed from IV to PO via gtube     Acute anemia  - HgB 6.7 (baseline 8) s/p 2U pRBC - improved to 9.9  - evaluated by GI - no evidence of GI bleed (PEG tube flushed with return of bile and no blood)  - Monitor H/H closely. Hb dropped to 6.6 yesterday >> trend CBC, Transfuse PRN, F/U GI  - Active T+S    Hyponatremia, likely hypervolemic  - c/w bumex 2mg iv q12hr and HD per nephro  - daily BMP    Afib w/ RVR - now rate controlled   - currently rate controlled s/p amio gtt  - currently on  amiodarone   200 q24H     CAD s/p stents  NSTEMI  - Trops 1.80-->1.63-->1.57-->1.55  - seen by cardio,  c/w asa and statin, medical management     DKA, resolved  - s/p insulin drip, now on basal/bolus and tube feeds. Monitor FS     Hx ICH s/p trach and PEG  Bedbound from NH   - c/w keppra 500mg BID for seizure prophylaxis    VDRF with new onset left sided ? Loculated effusion.   Pulm eval noted >> consider CT Chest NC if family/ HCP wants to ( while wife considering CMO ).    DNR only, overall prognosis is poor    Pending: GOC / Palliative     Dispo: TBD

## 2023-06-13 NOTE — PROGRESS NOTE ADULT - SUBJECTIVE AND OBJECTIVE BOX
Patient is a 63y old  Male who presents with a chief complaint of Abnormal labs (13 Jun 2023 08:03)      INTERVAL HPI/OVERNIGHT EVENTS:      REVIEW OF SYSTEMS:  CONSTITUTIONAL: No fever, weight loss, or fatigue  EYES: No eye pain, visual disturbances, or discharge  ENMT:  No difficulty hearing, tinnitus, vertigo; No sinus or throat pain  NECK: No pain or stiffness  BREASTS: No pain, masses, or nipple discharge  RESPIRATORY: No cough, wheezing, chills or hemoptysis; No shortness of breath  CARDIOVASCULAR: No chest pain, palpitations, dizziness, or leg swelling  GASTROINTESTINAL: No abdominal or epigastric pain. No nausea, vomiting, or hematemesis; No diarrhea or constipation. No melena or hematochezia.  GENITOURINARY: No dysuria, frequency, hematuria, or incontinence  NEUROLOGICAL: No headaches, memory loss, loss of strength, numbness, or tremors  SKIN: No itching, burning, rashes, or lesions   LYMPH NODES: No enlarged glands  ENDOCRINE: No heat or cold intolerance; No hair loss  MUSCULOSKELETAL: No joint pain or swelling; No muscle, back, or extremity pain  PSYCHIATRIC: No depression, anxiety, mood swings, or difficulty sleeping  HEME/LYMPH: No easy bruising, or bleeding gums  ALLERY AND IMMUNOLOGIC: No hives or eczema  FAMILY HISTORY:    T(C): 36.7 (06-13-23 @ 04:46), Max: 37.9 (06-12-23 @ 20:28)  HR: 81 (06-13-23 @ 08:20) (80 - 112)  BP: 101/56 (06-13-23 @ 04:46) (86/52 - 167/83)  RR: 18 (06-13-23 @ 04:46) (14 - 19)  SpO2: 100% (06-13-23 @ 08:20) (99% - 100%)  Wt(kg): --Vital Signs Last 24 Hrs  T(C): 36.7 (13 Jun 2023 04:46), Max: 37.9 (12 Jun 2023 20:28)  T(F): 98 (13 Jun 2023 04:46), Max: 100.2 (12 Jun 2023 20:28)  HR: 81 (13 Jun 2023 08:20) (80 - 112)  BP: 101/56 (13 Jun 2023 04:46) (86/52 - 167/83)  BP(mean): --  RR: 18 (13 Jun 2023 04:46) (14 - 19)  SpO2: 100% (13 Jun 2023 08:20) (99% - 100%)    Parameters below as of 13 Jun 2023 04:46  Patient On (Oxygen Delivery Method): ventilator    O2 Concentration (%): 40    PHYSICAL EXAM:  GENERAL: trach pegd  HEAD:  Atraumatic, Normocephalic  EYES: EOMI, PERRLA, conjunctiva and sclera clear  ENMT: No tonsillar erythema, exudates, or enlargement; Moist mucous membranes, Good dentition, No lesions  NECK: trach  NERVOUS SYSTEM:  Alert & Oriented X0  CHEST/LUNG: Clear to percussion bilaterally; No rales, rhonchi, wheezing, or rubs  HEART: Regular rate and rhythm; No murmurs, rubs, or gallops  ABDOMEN: peg  EXTREMITIES:  2+ Peripheral Pulses, No clubbing, cyanosis, or edema  LYMPH: No lymphadenopathy noted  SKIN: No rashes or lesions      acetaminophen     Tablet .. 650 milliGRAM(s) Oral every 6 hours PRN  albuterol/ipratropium for Nebulization 3 milliLiter(s) Nebulizer every 6 hours  aMIOdarone    Tablet   Oral   aMIOdarone    Tablet 200 milliGRAM(s) Oral daily  aspirin  chewable 81 milliGRAM(s) Oral daily  atorvastatin 80 milliGRAM(s) Oral at bedtime  buMETAnide Injectable 2 milliGRAM(s) IV Push every 12 hours  chlorhexidine 0.12% Liquid 15 milliLiter(s) Oral Mucosa two times a day  chlorhexidine 2% Cloths 1 Application(s) Topical <User Schedule>  dextrose 5%. 1000 milliLiter(s) IV Continuous <Continuous>  dextrose 5%. 1000 milliLiter(s) IV Continuous <Continuous>  dextrose 50% Injectable 25 Gram(s) IV Push once  dextrose 50% Injectable 25 Gram(s) IV Push once  dextrose 50% Injectable 12.5 Gram(s) IV Push once  dextrose Oral Gel 15 Gram(s) Oral once PRN  diltiazem    milliGRAM(s) Oral daily  fluconAZOLE   40 mG/mL Suspension 200 milliGRAM(s) Enteral Tube every 24 hours  glucagon  Injectable 1 milliGRAM(s) IntraMuscular once  heparin   Injectable 5000 Unit(s) SubCutaneous every 12 hours  insulin glargine Injectable (LANTUS) 10 Unit(s) SubCutaneous at bedtime  insulin lispro (ADMELOG) corrective regimen sliding scale   SubCutaneous three times a day before meals  levETIRAcetam  Solution 500 milliGRAM(s) Oral two times a day  midodrine. 2.5 milliGRAM(s) Oral three times a day PRN  pantoprazole  Injectable 40 milliGRAM(s) IV Push every 12 hours  polyethylene glycol 3350 17 Gram(s) Oral daily  senna 2 Tablet(s) Oral at bedtime  silver sulfADIAZINE 1% Cream 1 Application(s) Topical every 12 hours  sodium chloride 0.9% Bolus. 100 milliLiter(s) IV Bolus every 5 minutes PRN    Mode: AC/ CMV (Assist Control/ Continuous Mandatory Ventilation), RR (machine): 14, TV (machine): 450, FiO2: 40, PEEP: 8, ITime: 1, MAP: 13, PIP: 27  HEALTH ISSUES - PROBLEM Dx:  Sepsis    Acute UTI    History of intracranial hemorrhage    Palliative care by specialist    Anemia    MICHELLE (acute kidney injury)

## 2023-06-13 NOTE — PROGRESS NOTE ADULT - ASSESSMENT
Acute Renal Failure, started on HD 6/1  Fluid Overload  HAGMA  - non oliguric, RBUS without hydro   - started on HD 6/1 via R IJ [place]   - Cr much improved s/p HD yesterday, now 1.9  f/u Nephro recs  - c/w bumex 2mg iv q12hr, HD per renal--> HD today   - c/w midodrine 10mg q8hr  - c/w sodium bicarb 1300mg PO TID  -  TTE 6/1 - EF 63%, GIDD   - nephrology following   - HD today    #Possible pseudomonal pneumonia   - History of resistant pseudomonal infection  - Chest Xray worsening   - fu repeat chest xray  - fu ID consult  - stable for now  - hold on ax until ID consult    Candida tropicalis UTI  s/p cefepime and vanc   Blood clts neg, URine with Candida tropicalis   on  fluconazole, ID following can change from IV to PO via gtube     Acute anemia  - HgB 6.7 (baseline 8) s/p 2U pRBC - improved to 9.9  - dropped again - sp total of 4 units  - evaluated by GI - no evidence of GI bleed (PEG tube flushed with return of bile and no blood)  - Monitor H/H closely  - Active T+S    Hyponatremia, likely hypervolemic  - c/w bumex 2mg iv q12hr and HD per nephro  - daily BMP    Afib w/ RVR - now rate controlled   - currently rate controlled s/p amio gtt  - change amio to 200 q24H   - started [id number] mg daily as he went back into afib rvr yest.  - now rate controlled    CAD s/p stents  NSTEMI  - Trops 1.80-->1.63-->1.57-->1.55  - seen by cardio,  c/w asa and statin, medical management     DKA, resolved  - s/p insulin drip, now on basal/bolus and tube feeds. Monitor FS     Hx ICH s/p trach and PEG  Bedbound from NH   - c/w keppra 500mg BID for seizure prophylaxis    DNR only, overall prognosis is poor  - Wife would like to speak to palliative before making decision on CMO

## 2023-06-14 NOTE — PROGRESS NOTE ADULT - ASSESSMENT
62 yo M with  PMH of ICH (2021) s/p trach and PEG, HTN, HLD, T2DM, CAD s/p stents, Recurrent C diff, BIBEMS from [facility] for abnormal labs. Patient non-verbal at baseline - limited history. Per NH chart  have a BUN greater than 200 and creatinine of 4.3 on outpatient labs. Outpatient CXR also w/ new L sided pleural effusion. With EMS patient was also found to be in irregular rhythm, no known history of A-fib or a flutter.  MICHELLE on CKD 3a - severe azotemia  likely due to GIB / hypotension  Started HD on 6/1/23  Acute anemia d/t UGIB  Troponemia  Lactic acidosis resolved   DKA resolved   Afib   - for HD today UF 2l as tolerated   - continue bumex , document accurate UO   - Fever  / patient has a udall/ BC negative 6/11  - h/h noted / s/p blood tx check repeat   - BP noted /  midodrine  5 q8   - last  phos noted / no binders   - if cr not improving will need TDC / after HD today can remove U dall   - very poor prognosis   will follow

## 2023-06-14 NOTE — PROGRESS NOTE ADULT - ATTENDING COMMENTS
62 yo M with  PMH of ICH (2021) s/p trach and PEG, HTN, HLD, T2DM, CAD s/p stents, Recurrent C diff, BIBEMS from [facility] for abnormal labs. Patient non-verbal at baseline - limited history. Per NH chart  have a BUN greater than 200 and creatinine of 4.3 on outpatient labs. Outpatient CXR also w/ new L sided pleural effusion. In ED, patient was hypotensive with Afib with RVR, found to be in acure renal failure, anemic with elevated trops. He was started on Amiodarone GTT, PPI GTT, gastric lavage with coffee ground secretions, started on broad spectrum abx and admitted to MICU  While in MICU, found to be in DKA, s/p insulin drip, MICHELLE started on HD, acute anemia s/p PRBC transfusion, NSTEMI with downtrending trops, now downgraded to SDU -> floor     # Acute Renal Failure, started on HD 6/1  Fluid Overload  HAGMA  - non oliguric, RBUS without hydro   - started on HD 6/1 via R IJ [place]   - Cr trending down >> 3.0 >> 1.9 >>2.3 >> 2.0      trend Cr   - c/w bumex   - c/w sodium bicarb 1300mg PO TID  -  TTE 6/1 - EF 63%, GIDD     # Candida tropicalis UTI  s/p cefepime and vanc   Blood clts neg, URine with Candida tropicalis   on  fluconazole, changed from IV to PO via gtube     # Acute anemia  - HgB 6.7 (baseline 8) s/p 2U pRBC - improved to 9.9  - evaluated by GI - no evidence of GI bleed (PEG tube flushed with return of bile and no blood)  - Monitor H/H closely. Hb dropped to 6.6 yesterday >> trend CBC, Transfuse PRN, F/U GI  - Active T+S    # Hyponatremia, likely hypervolemic  - c/w bumex 2mg iv q12hr and HD per nephro  - daily BMP    # Afib w/ RVR - now rate controlled   - currently rate controlled s/p amio gtt  - currently on  amiodarone   200 q24H     # CAD s/p stents, NSTEMI  - Trops 1.80-->1.63-->1.57-->1.55  - seen by cardio,  c/w asa and statin, medical management     # DKA, resolved  - s/p insulin drip, now on basal/bolus and tube feeds. Monitor FS     # Hx ICH s/p trach and PEG  Bedbound from NH   - c/w keppra 500mg BID for seizure prophylaxis    # VDRF with new onset left sided ? Loculated effusion.   Pulm eval noted, recommend CT     # palliative care, dw palliative care team, they will cont following for symptom management     #Progress Note Handoff  Pending :  ct chest  Family discussion: team spoke with wife  Disposition: SNF

## 2023-06-14 NOTE — PROGRESS NOTE ADULT - ASSESSMENT
ASSESSMENT  - MICHELLE on CKD3 now on HD  - fluid overload and anasarca  - multiple areas of clean but deep decubiti  - DM  - hyponatremia  - ? DKA on admission  - A fib  - h/o recurrent C diff    SUGGEST:   -continue with Peptamen AF for now     375 ml over 45 min x 4 feeds/d --> 1800 kcal (low % carb) 114 gm protein (whey source), 1200 mg phos, 62 mEq K/d  - check poc glucose prior to each feeding  - f/u phos with pre-HD labs  - limit pre and post feed flushes to 30 ml  - document BMs  - will later transition to Glucerna, and at NH likely rec the 1.5 ASSESSMENT  - MICHELLE on CKD3 now on HD  - fluid overload and anasarca  - multiple areas of clean but deep decubiti  - DM  - hyponatremia  - ? DKA on admission  - A fib  - h/o recurrent C diff    SUGGEST:   -continue with Peptamen AF      375 ml over 45 min x 4 feeds/d --> 1800 kcal (low % carb) 114 gm protein (whey source), 1200 mg phos, 62 mEq K/d  - check poc glucose prior to each feeding  - f/u phos with pre-HD labs  - limit pre and post feed flushes to 30 ml  - document BMs  - will later transition to Glucerna, and at NH likely rec the 1.5

## 2023-06-14 NOTE — PROGRESS NOTE ADULT - ASSESSMENT
Acute Renal Failure, started on HD 6/1  Fluid Overload  HAGMA  - non oliguric, RBUS without hydro   - started on HD 6/1 via R IJ [place]   - Cr much improved s/p HD yesterday, now 1.9  f/u Nephro recs  - c/w bumex 2mg iv q12hr, HD per renal--> HD today   - c/w midodrine 10mg q8hr  - c/w sodium bicarb 1300mg PO TID  -  TTE 6/1 - EF 63%, GIDD   - nephrology following   - HD today -> if cr not improving will need TDC as per nephro.   - IR consult for TDC    #Possible pseudomonal pneumonia   - History of resistant pseudomonal infection  - Chest Xray worsening   - fu repeat chest xray  - fu ID consult  - spike fever last night - suspect VAP  - Switch gentamicin to ceftolozane/tazobactam    Candida tropicalis UTI  s/p cefepime and vanc   Blood clts neg, URine with Candida tropicalis   on  fluconazole, ID following     Acute anemia  - HgB 7.2 (baseline 8)   - evaluated by GI - no evidence of GI bleed (PEG tube flushed with return of bile and no blood)  - Monitor H/H closely  - Active T+S  - sp multiple units on this hospitilization    Hyponatremia, likely hypervolemic  - c/w bumex 2mg iv q12hr and HD per nephro  - daily BMP    Afib w/ RVR - now rate controlled   - currently rate controlled s/p amio gtt  - change amio to 200 q24H   - started [id number] mg daily as he went back into afib rvr yest.  - now rate controlled    CAD s/p stents  NSTEMI  - Trops 1.80-->1.63-->1.57-->1.55  - seen by cardio,  c/w asa and statin, medical management     DKA, resolved  - s/p insulin drip, now on basal/bolus and tube feeds. Monitor FS     Hx ICH s/p trach and PEG  Bedbound from NH   - c/w keppra 500mg BID for seizure prophylaxis    DNR only, overall prognosis is poor  - Wife would like to speak to palliative before making decision on CMO    #Unstageable sacral ulcer  - Burn consult

## 2023-06-14 NOTE — PROGRESS NOTE ADULT - ASSESSMENT
62 yo M with  PMH of ICH (2021) s/p trach and PEG, HTN, HLD, T2DM, CAD s/p stents, Recurrent C diff, BIBEMS [place] [facility] [place]. Patient non-verbal at baseline - limited history. Per NH chart  have a BUN greater than 200 and creatinine of 4.3 on outpatient labs. Outpatient CXR also w/ new L sided pleural effusion. With EMS patient was also found to be in irregular rhythm, no known history of A-fib or a flutter.    IMPRESSION/RECOMMENDATIONS   #Fever, suspect VAP    CXR Further significant increase in left-sided opacities/effusion with minimal aeration of the left lung. Right-sided opacities redemonstrated    6/11 BCX NGTD     6/11 UCX   >100,000 CFU/ml Pseudomonas aeruginosa    6/11 sputum   Three or more mixed gram negative rods including    Moderate Pseudomonas aeruginosa (Carbapenem Resistant)    6/7 BCX NGTD    #Candiduria with UCX C tropicalis    5/30 UCX +     Generally contaminant, previously ID recommended 14 days    5/30 BCx NG      RECOMMENDATIONS  This is a preliminary incomplete pended note, all final recommendations to follow after interview and examination of the patient.  - fluconazole, end 6/15    If any questions, please call or send a message on [employer]  Please continue to update ID with any pertinent new laboratory or radiographic findings  Spectra [phone]  62 yo M with  PMH of ICH (2021) s/p trach and PEG, HTN, HLD, T2DM, CAD s/p stents, Recurrent C diff, BIBEMS from [facility] for abnormal labs. Patient non-verbal at baseline - limited history. Per NH chart  have a BUN greater than 200 and creatinine of 4.3 on outpatient labs. Outpatient CXR also w/ new L sided pleural effusion. With EMS patient was also found to be in irregular rhythm, no known history of A-fib or a flutter.    IMPRESSION/RECOMMENDATIONS   #Fever, suspect VAP    CXR Further significant increase in left-sided opacities/effusion with minimal aeration of the left lung. Right-sided opacities redemonstrated    6/11 BCX NGTD     6/11 UCX   >100,000 CFU/ml Pseudomonas aeruginosa    6/11 sputum   Three or more mixed gram negative rods including    Moderate Pseudomonas aeruginosa (Carbapenem Resistant)    6/7 BCX NGTD    #Candiduria with UCX C tropicalis    5/30 UCX +     Generally contaminant, previously ID recommended 14 days    5/30 BCx NG      RECOMMENDATIONS  - Recommended switching gentamicin to ceftolozane-tazobactam 2.25g IV x1, followed by 450mg IV q8h (HD dosing) for the treatment of pneumonia and UTI due to carbapenem-resistant Pseudomonas aeruginosa x 7 days  - fluconazole, end 6/15  - Guarded prognosis, GOC     If any questions, please call or send a message on [employer]  Please continue to update ID with any pertinent new laboratory or radiographic findings  Spectra [phone]

## 2023-06-14 NOTE — PROGRESS NOTE ADULT - ASSESSMENT
IMPRESSION:    Sepsis present on admission  Candida UTI  pseudomonas pna  l side atelectasis/ effusion  MICHELEL on CKD III on dialysis   NSTEMI likely type 2  Hyponatremia resolved   Paroxysmal Afib  Acute on chronic anemia suspected UGI Bleed  Chronic respiratory failure  H/o ICH s/p trach and PEG  H/o CAD      PLAN:    CNS: Avoid CNS depressants     HEENT: Oral and trach care    PULMONARY: HOB 45. Aspiration.  No vent changes.  Goal saturation 92-96%. Vent dependents.  Pulmonary toilet , not weanable, chest CT NC    CARDIOVASCULAR: Avoid overload.  Continue rate control     GI: PPI q 12    RENAL: trend CMP.  Correct as needed.  Nephrology following     INFECTIOUS DISEASE: per ID    HEMATOLOGICAL: DVT prophylaxis.  LE duplex negative.  Monitor CBC      ENDOCRINE: Follow up FS. Insulin protocol if needed.      DNR    Poor prognosis

## 2023-06-14 NOTE — PROGRESS NOTE ADULT - SUBJECTIVE AND OBJECTIVE BOX
ALICIA BARBOUR  63y, Male  Allergy: penicillin (Other)      LOS  15d    CHIEF COMPLAINT: Abnormal labs (14 Jun 2023 08:48)      INTERVAL EVENTS/HPI  - ID reconsulted for fever  - T(F): , Max: 101 (06-13-23 @ 18:21)  - Tolerating medication  - WBC Count: 9.69 (06-13-23 @ 19:14)  WBC Count: 12.54 (06-11-23 @ 19:18)     - Creatinine, Serum: 2.4 (06-13-23 @ 19:14)  Creatinine, Serum: 2.4 (06-13-23 @ 17:05)       ROS  ***    VITALS:  T(F): 98.7, Max: 101 (06-13-23 @ 18:21)  HR: 77  BP: 107/59  RR: 18Vital Signs Last 24 Hrs  T(C): 37.1 (14 Jun 2023 05:00), Max: 38.3 (13 Jun 2023 18:21)  T(F): 98.7 (14 Jun 2023 05:00), Max: 101 (13 Jun 2023 18:21)  HR: 77 (14 Jun 2023 05:00) (77 - 83)  BP: 107/59 (14 Jun 2023 05:00) (93/50 - 115/59)  BP(mean): --  RR: 18 (14 Jun 2023 05:00) (18 - 18)  SpO2: 98% (14 Jun 2023 05:00) (98% - 100%)    Parameters below as of 13 Jun 2023 20:40  Patient On (Oxygen Delivery Method): ventilator        PHYSICAL EXAM:  ***    FH: Non-contributory  Social Hx: Non-contributory    TESTS & MEASUREMENTS:                        7.2    9.69  )-----------( 272      ( 13 Jun 2023 19:14 )             23.3     06-13    134<L>  |  98  |  65<HH>  ----------------------------<  152<H>  4.8   |  26  |  2.4<H>    Ca    8.3<L>      13 Jun 2023 19:14  Mg     2.4     06-12    TPro  5.2<L>  /  Alb  1.9<L>  /  TBili  0.4  /  DBili  x   /  AST  58<H>  /  ALT  15  /  AlkPhos  208<H>  06-13      LIVER FUNCTIONS - ( 13 Jun 2023 19:14 )  Alb: 1.9 g/dL / Pro: 5.2 g/dL / ALK PHOS: 208 U/L / ALT: 15 U/L / AST: 58 U/L / GGT: x               Culture - Blood (collected 06-11-23 @ 19:18)  Source: .Blood Blood  Preliminary Report (06-12-23 @ 23:02):    No growth to date.    Culture - Sputum (collected 06-11-23 @ 18:28)  Source: Trach Asp Tracheal Aspirate  Gram Stain (06-12-23 @ 07:16):    Moderate polymorphonuclear leukocytes per low power field    Rare Squamous epithelial cells per low power field    Numerous Gram Negative Rods per oil power field    Moderate Gram Positive Rods per oil power field    Few Gram positive cocci in pairs per oil power field  Final Report (06-13-23 @ 18:59):    Three or more mixed gram negative rods including    Moderate Pseudomonas aeruginosa (Carbapenem Resistant)    Normal Respiratory Susan present  Organism: Pseudomonas aeruginosa (Carbapenem Resistant)  Pseudomonas aeruginosa (Carbapenem Resistant) (06-13-23 @ 18:59)  Organism: Pseudomonas aeruginosa (Carbapenem Resistant) (06-13-23 @ 18:59)      Method Type: CarbaR      -  Resistance Gene to Carbapenem: Nondet  Organism: Pseudomonas aeruginosa (Carbapenem Resistant) (06-13-23 @ 18:59)      Method Type: TAMIE      -  Amikacin: S <=16      -  Aztreonam: I 16      -  Cefepime: I 16      -  Ceftazidime: R >16      -  Ceftazidime/Avibactam: S <=4      -  Ceftolozane/tazobactam: S <=2      -  Ciprofloxacin: R >2      -  Gentamicin: S 4      -  Imipenem: R >8      -  Levofloxacin: R >4      -  Meropenem: R >8      -  Piperacillin/Tazobactam: R >64      -  Tobramycin: S <=2    Culture - Urine (collected 06-11-23 @ 18:25)  Source: Catheterized Catheterized  Preliminary Report (06-13-23 @ 18:35):    >100,000 CFU/ml Pseudomonas aeruginosa    Culture - Blood (collected 06-07-23 @ 06:24)  Source: .Blood None  Final Report (06-12-23 @ 23:00):    No Growth Final    Culture - Urine (collected 05-30-23 @ 21:15)  Source: Catheterized Catheterized  Final Report (06-01-23 @ 22:29):    >100,000 CFU/ml Candida tropicalis "Susceptibilities not performed"    Culture - Blood (collected 05-30-23 @ 20:09)  Source: .Blood Blood-Peripheral  Final Report (06-05-23 @ 02:01):    No Growth Final    Culture - Blood (collected 05-30-23 @ 20:09)  Source: .Blood Blood-Peripheral  Final Report (06-05-23 @ 02:01):    No Growth Final            INFECTIOUS DISEASES TESTING  Procalcitonin, Serum: 1.38 (06-12-23 @ 09:44)  Procalcitonin, Serum: 1.12 (06-11-23 @ 21:02)  Rapid RVP Result: NotDetec (06-09-23 @ 17:01)  HIV-1/2 Combo Result: Nonreact (06-08-23 @ 16:00)  Rapid HIV-1/2 Antibody: Nonreact (06-08-23 @ 12:20)  Vancomycin Level, Random: 12.5 (06-02-23 @ 19:45)  MRSA PCR Result.: Negative (06-02-23 @ 01:00)  Procalcitonin, Serum: 2.38 (05-31-23 @ 05:50)  COVID-19 PCR: NotDetec (10-13-22 @ 04:30)  COVID-19 PCR: NotDetec (10-11-22 @ 04:20)  Procalcitonin, Serum: 0.86 (10-10-22 @ 12:04)  Procalcitonin, Serum: 0.33 (10-06-22 @ 11:34)  COVID-19 PCR: NotDetec (10-04-22 @ 00:43)  strept    INFLAMMATORY MARKERS      RADIOLOGY & ADDITIONAL TESTS:  I have personally reviewed the last available Chest xray  CXR  Xray Chest 1 View- PORTABLE-Urgent:   ACC: [id number] EXAM:  XR CHEST PORTABLE URGENT 1V   ORDERED BY: SUSU FAYE     PROCEDURE DATE:  06/13/2023          INTERPRETATION:  Clinical History / Reason for exam: Pneumonia    Comparison : Chest radiograph 6/11/2023.    Technique/Positioning: Frontal chest radiograph.    Findings:    Support devices: Tracheostomy tube. Stable right-sided central venous   catheter. Shunt tubing overlying the right chest.    Cardiac/mediastinum/hilum: Obscured    Lung parenchyma/Pleura: Further significant increase in left-sided   opacities/effusion with minimal aeration of the left lung. Right-sided   opacities redemonstrated. No definite pneumothorax.    Skeleton/soft tissues: Unchanged    Impression:    Further significant increase in left-sided opacities/effusion with   minimal aeration of the left lung. Right-sided opacities redemonstrated    --- End of Report ---            KRISTAN GOMES MD; Attending Radiologist  This document has been electronically signed. Jun 13 2023 10:55AM (06-13-23 @ 10:50)      CT      CARDIOLOGY TESTING  12 Lead ECG:   Ventricular Rate 103 BPM    Atrial Rate 103 BPM    P-R Interval 120 ms    QRS Duration 64 ms    Q-T Interval 322 ms    QTC Calculation(Bazett) 421 ms    P Axis 47 degrees    R Axis 23 degrees    T Axis 26 degrees    Diagnosis Line Sinus tachycardia  Low voltage QRS  Borderline ECG    Confirmed by Claudette Bradley MD ([id number]) on 6/12/2023 7:01:21 PM (06-12-23 @ 13:30)      MEDICATIONS  acetylcysteine 20%  Inhalation 4 Inhalation every 6 hours  albuterol/ipratropium for Nebulization 3 Nebulizer every 6 hours  aMIOdarone    Tablet 200 Oral daily  aMIOdarone    Tablet  Oral   aspirin  chewable 81 Oral daily  atorvastatin 80 Oral at bedtime  buMETAnide Injectable 2 IV Push every 12 hours  chlorhexidine 0.12% Liquid 15 Oral Mucosa two times a day  chlorhexidine 2% Cloths 1 Topical <User Schedule>  dextrose 5%. 1000 IV Continuous <Continuous>  dextrose 5%. 1000 IV Continuous <Continuous>  dextrose 50% Injectable 25 IV Push once  dextrose 50% Injectable 25 IV Push once  dextrose 50% Injectable 12.5 IV Push once  diltiazem    Oral daily  fluconAZOLE   40 mG/mL Suspension 200 Enteral Tube every 24 hours  gentamicin   IVPB     gentamicin   IVPB 60 IV Intermittent every 24 hours  glucagon  Injectable 1 IntraMuscular once  heparin   Injectable 5000 SubCutaneous every 12 hours  insulin glargine Injectable (LANTUS) 10 SubCutaneous at bedtime  insulin lispro (ADMELOG) corrective regimen sliding scale  SubCutaneous three times a day before meals  levETIRAcetam  Solution 500 Oral two times a day  pantoprazole  Injectable 40 IV Push every 12 hours  polyethylene glycol 3350 17 Oral daily  senna 2 Oral at bedtime  silver sulfADIAZINE 1% Cream 1 Topical every 12 hours      WEIGHT  Weight (kg): 93.3 (06-05-23 @ 17:10)  Creatinine, Serum: 2.4 mg/dL (06-13-23 @ 19:14)  Creatinine, Serum: 2.4 mg/dL (06-13-23 @ 17:05)      ANTIBIOTICS:  fluconAZOLE   40 mG/mL Suspension 200 milliGRAM(s) Enteral Tube every 24 hours  gentamicin   IVPB      gentamicin   IVPB 60 milliGRAM(s) IV Intermittent every 24 hours      All available historical records have been reviewed       ALICIA BARBOUR  63y, Male  Allergy: penicillin (Other)      LOS  15d    CHIEF COMPLAINT: Abnormal labs (14 Jun 2023 08:48)      INTERVAL EVENTS/HPI  - ID reconsulted for fever  - T(F): , Max: 101 (06-13-23 @ 18:21)  - Tolerating medication  - WBC Count: 9.69 (06-13-23 @ 19:14)  WBC Count: 12.54 (06-11-23 @ 19:18)     - Creatinine, Serum: 2.4 (06-13-23 @ 19:14)  Creatinine, Serum: 2.4 (06-13-23 @ 17:05)       ROS  unable to obtain history secondary to patient's mental status and/or sedation     VITALS:  T(F): 98.7, Max: 101 (06-13-23 @ 18:21)  HR: 77  BP: 107/59  RR: 18Vital Signs Last 24 Hrs  T(C): 37.1 (14 Jun 2023 05:00), Max: 38.3 (13 Jun 2023 18:21)  T(F): 98.7 (14 Jun 2023 05:00), Max: 101 (13 Jun 2023 18:21)  HR: 77 (14 Jun 2023 05:00) (77 - 83)  BP: 107/59 (14 Jun 2023 05:00) (93/50 - 115/59)  BP(mean): --  RR: 18 (14 Jun 2023 05:00) (18 - 18)  SpO2: 98% (14 Jun 2023 05:00) (98% - 100%)    Parameters below as of 13 Jun 2023 20:40  Patient On (Oxygen Delivery Method): ventilator        PHYSICAL EXAM:  Gen: trach/ vent  CV: RRR  Lungs: Decreased BS at bases  Abd: Soft  Neuro: does not follow commands  Skin: no rash   Lines clean, no phlebitis     FH: Non-contributory  Social Hx: Non-contributory    TESTS & MEASUREMENTS:                        7.2    9.69  )-----------( 272      ( 13 Jun 2023 19:14 )             23.3     06-13    134<L>  |  98  |  65<HH>  ----------------------------<  152<H>  4.8   |  26  |  2.4<H>    Ca    8.3<L>      13 Jun 2023 19:14  Mg     2.4     06-12    TPro  5.2<L>  /  Alb  1.9<L>  /  TBili  0.4  /  DBili  x   /  AST  58<H>  /  ALT  15  /  AlkPhos  208<H>  06-13      LIVER FUNCTIONS - ( 13 Jun 2023 19:14 )  Alb: 1.9 g/dL / Pro: 5.2 g/dL / ALK PHOS: 208 U/L / ALT: 15 U/L / AST: 58 U/L / GGT: x               Culture - Blood (collected 06-11-23 @ 19:18)  Source: .Blood Blood  Preliminary Report (06-12-23 @ 23:02):    No growth to date.    Culture - Sputum (collected 06-11-23 @ 18:28)  Source: Trach Asp Tracheal Aspirate  Gram Stain (06-12-23 @ 07:16):    Moderate polymorphonuclear leukocytes per low power field    Rare Squamous epithelial cells per low power field    Numerous Gram Negative Rods per oil power field    Moderate Gram Positive Rods per oil power field    Few Gram positive cocci in pairs per oil power field  Final Report (06-13-23 @ 18:59):    Three or more mixed gram negative rods including    Moderate Pseudomonas aeruginosa (Carbapenem Resistant)    Normal Respiratory Susan present  Organism: Pseudomonas aeruginosa (Carbapenem Resistant)  Pseudomonas aeruginosa (Carbapenem Resistant) (06-13-23 @ 18:59)  Organism: Pseudomonas aeruginosa (Carbapenem Resistant) (06-13-23 @ 18:59)      Method Type: CarbaR      -  Resistance Gene to Carbapenem: Nondet  Organism: Pseudomonas aeruginosa (Carbapenem Resistant) (06-13-23 @ 18:59)      Method Type: TAMIE      -  Amikacin: S <=16      -  Aztreonam: I 16      -  Cefepime: I 16      -  Ceftazidime: R >16      -  Ceftazidime/Avibactam: S <=4      -  Ceftolozane/tazobactam: S <=2      -  Ciprofloxacin: R >2      -  Gentamicin: S 4      -  Imipenem: R >8      -  Levofloxacin: R >4      -  Meropenem: R >8      -  Piperacillin/Tazobactam: R >64      -  Tobramycin: S <=2    Culture - Urine (collected 06-11-23 @ 18:25)  Source: Catheterized Catheterized  Preliminary Report (06-13-23 @ 18:35):    >100,000 CFU/ml Pseudomonas aeruginosa    Culture - Blood (collected 06-07-23 @ 06:24)  Source: .Blood None  Final Report (06-12-23 @ 23:00):    No Growth Final    Culture - Urine (collected 05-30-23 @ 21:15)  Source: Catheterized Catheterized  Final Report (06-01-23 @ 22:29):    >100,000 CFU/ml Candida tropicalis "Susceptibilities not performed"    Culture - Blood (collected 05-30-23 @ 20:09)  Source: .Blood Blood-Peripheral  Final Report (06-05-23 @ 02:01):    No Growth Final    Culture - Blood (collected 05-30-23 @ 20:09)  Source: .Blood Blood-Peripheral  Final Report (06-05-23 @ 02:01):    No Growth Final            INFECTIOUS DISEASES TESTING  Procalcitonin, Serum: 1.38 (06-12-23 @ 09:44)  Procalcitonin, Serum: 1.12 (06-11-23 @ 21:02)  Rapid RVP Result: NotDetec (06-09-23 @ 17:01)  HIV-1/2 Combo Result: Nonreact (06-08-23 @ 16:00)  Rapid HIV-1/2 Antibody: Nonreact (06-08-23 @ 12:20)  Vancomycin Level, Random: 12.5 (06-02-23 @ 19:45)  MRSA PCR Result.: Negative (06-02-23 @ 01:00)  Procalcitonin, Serum: 2.38 (05-31-23 @ 05:50)  COVID-19 PCR: NotDetec (10-13-22 @ 04:30)  COVID-19 PCR: NotDetec (10-11-22 @ 04:20)  Procalcitonin, Serum: 0.86 (10-10-22 @ 12:04)  Procalcitonin, Serum: 0.33 (10-06-22 @ 11:34)  COVID-19 PCR: NotDetec (10-04-22 @ 00:43)  strept    INFLAMMATORY MARKERS      RADIOLOGY & ADDITIONAL TESTS:  I have personally reviewed the last available Chest xray  CXR  Xray Chest 1 View- PORTABLE-Urgent:   ACC: [id number] EXAM:  XR CHEST PORTABLE URGENT 1V   ORDERED BY: SUSU FAYE     PROCEDURE DATE:  06/13/2023          INTERPRETATION:  Clinical History / Reason for exam: Pneumonia    Comparison : Chest radiograph 6/11/2023.    Technique/Positioning: Frontal chest radiograph.    Findings:    Support devices: Tracheostomy tube. Stable right-sided central venous   catheter. Shunt tubing overlying the right chest.    Cardiac/mediastinum/hilum: Obscured    Lung parenchyma/Pleura: Further significant increase in left-sided   opacities/effusion with minimal aeration of the left lung. Right-sided   opacities redemonstrated. No definite pneumothorax.    Skeleton/soft tissues: Unchanged    Impression:    Further significant increase in left-sided opacities/effusion with   minimal aeration of the left lung. Right-sided opacities redemonstrated    --- End of Report ---            KRISTAN GOMES MD; Attending Radiologist  This document has been electronically signed. Jun 13 2023 10:55AM (06-13-23 @ 10:50)      CT      CARDIOLOGY TESTING  12 Lead ECG:   Ventricular Rate 103 BPM    Atrial Rate 103 BPM    P-R Interval 120 ms    QRS Duration 64 ms    Q-T Interval 322 ms    QTC Calculation(Bazett) 421 ms    P Axis 47 degrees    R Axis 23 degrees    T Axis 26 degrees    Diagnosis Line Sinus tachycardia  Low voltage QRS  Borderline ECG    Confirmed by Claudette Bradley MD ([id number]) on 6/12/2023 7:01:21 PM (06-12-23 @ 13:30)      MEDICATIONS  acetylcysteine 20%  Inhalation 4 Inhalation every 6 hours  albuterol/ipratropium for Nebulization 3 Nebulizer every 6 hours  aMIOdarone    Tablet 200 Oral daily  aMIOdarone    Tablet  Oral   aspirin  chewable 81 Oral daily  atorvastatin 80 Oral at bedtime  buMETAnide Injectable 2 IV Push every 12 hours  chlorhexidine 0.12% Liquid 15 Oral Mucosa two times a day  chlorhexidine 2% Cloths 1 Topical <User Schedule>  dextrose 5%. 1000 IV Continuous <Continuous>  dextrose 5%. 1000 IV Continuous <Continuous>  dextrose 50% Injectable 25 IV Push once  dextrose 50% Injectable 25 IV Push once  dextrose 50% Injectable 12.5 IV Push once  diltiazem    Oral daily  fluconAZOLE   40 mG/mL Suspension 200 Enteral Tube every 24 hours  gentamicin   IVPB     gentamicin   IVPB 60 IV Intermittent every 24 hours  glucagon  Injectable 1 IntraMuscular once  heparin   Injectable 5000 SubCutaneous every 12 hours  insulin glargine Injectable (LANTUS) 10 SubCutaneous at bedtime  insulin lispro (ADMELOG) corrective regimen sliding scale  SubCutaneous three times a day before meals  levETIRAcetam  Solution 500 Oral two times a day  pantoprazole  Injectable 40 IV Push every 12 hours  polyethylene glycol 3350 17 Oral daily  senna 2 Oral at bedtime  silver sulfADIAZINE 1% Cream 1 Topical every 12 hours      WEIGHT  Weight (kg): 93.3 (06-05-23 @ 17:10)  Creatinine, Serum: 2.4 mg/dL (06-13-23 @ 19:14)  Creatinine, Serum: 2.4 mg/dL (06-13-23 @ 17:05)      ANTIBIOTICS:  fluconAZOLE   40 mG/mL Suspension 200 milliGRAM(s) Enteral Tube every 24 hours  gentamicin   IVPB      gentamicin   IVPB 60 milliGRAM(s) IV Intermittent every 24 hours      All available historical records have been reviewed

## 2023-06-14 NOTE — PROGRESS NOTE ADULT - SUBJECTIVE AND OBJECTIVE BOX
Patient is a 63y old  Male who presents with a chief complaint of Abnormal labs (14 Jun 2023 10:19)      INTERVAL HPI/OVERNIGHT EVENTS:  None    T(C): 37.1 (06-14-23 @ 05:00), Max: 38.3 (06-13-23 @ 18:21)  HR: 77 (06-14-23 @ 09:10) (77 - 89)  BP: 102/51 (06-14-23 @ 09:10) (93/50 - 115/59)  RR: 18 (06-14-23 @ 09:10) (18 - 18)  SpO2: 100% (06-14-23 @ 09:10) (98% - 100%)  Wt(kg): --Vital Signs Last 24 Hrs  T(C): 37.1 (14 Jun 2023 05:00), Max: 38.3 (13 Jun 2023 18:21)  T(F): 98.7 (14 Jun 2023 05:00), Max: 101 (13 Jun 2023 18:21)  HR: 77 (14 Jun 2023 09:10) (77 - 89)  BP: 102/51 (14 Jun 2023 09:10) (93/50 - 115/59)  BP(mean): --  RR: 18 (14 Jun 2023 09:10) (18 - 18)  SpO2: 100% (14 Jun 2023 09:10) (98% - 100%)    Parameters below as of 14 Jun 2023 09:10  Patient On (Oxygen Delivery Method): ventilator    O2 Concentration (%): 40    PHYSICAL EXAM:  GENERAL: Trach and peg  HEAD:  Atraumatic, Normocephalic  EYES: EOMI, PERRLA, conjunctiva and sclera clear  ENMT: No tonsillar erythema, exudates, or enlargement; Moist mucous membranes, Good dentition, No lesions  NECK: Trach  NERVOUS SYSTEM:  Alert & Oriented X0  CHEST/LUNG: Decreased breath sounds on Left lung  HEART: Regular rate and rhythm; No murmurs, rubs, or gallops  ABDOMEN: PEG  EXTREMITIES:  2+ Peripheral Pulses, No clubbing, cyanosis, or edema  LYMPH: No lymphadenopathy noted  SKIN: No rashes or lesions      acetaminophen     Tablet .. 650 milliGRAM(s) Oral every 6 hours PRN  acetylcysteine 20%  Inhalation 4 milliLiter(s) Inhalation every 6 hours  albuterol/ipratropium for Nebulization 3 milliLiter(s) Nebulizer every 6 hours  aMIOdarone    Tablet 200 milliGRAM(s) Oral daily  aMIOdarone    Tablet   Oral   aspirin  chewable 81 milliGRAM(s) Oral daily  atorvastatin 80 milliGRAM(s) Oral at bedtime  buMETAnide Injectable 2 milliGRAM(s) IV Push every 12 hours  ceftolozane/tazobactam IVPB 450 milliGRAM(s) IV Intermittent every 8 hours  ceftolozane/tazobactam IVPB 2250 milliGRAM(s) IV Intermittent once  chlorhexidine 0.12% Liquid 15 milliLiter(s) Oral Mucosa two times a day  chlorhexidine 2% Cloths 1 Application(s) Topical <User Schedule>  dextrose 5%. 1000 milliLiter(s) IV Continuous <Continuous>  dextrose 5%. 1000 milliLiter(s) IV Continuous <Continuous>  dextrose 50% Injectable 25 Gram(s) IV Push once  dextrose 50% Injectable 25 Gram(s) IV Push once  dextrose 50% Injectable 12.5 Gram(s) IV Push once  dextrose Oral Gel 15 Gram(s) Oral once PRN  diltiazem    milliGRAM(s) Oral daily  fluconAZOLE   40 mG/mL Suspension 200 milliGRAM(s) Enteral Tube every 24 hours  glucagon  Injectable 1 milliGRAM(s) IntraMuscular once  heparin   Injectable 5000 Unit(s) SubCutaneous every 12 hours  insulin glargine Injectable (LANTUS) 10 Unit(s) SubCutaneous at bedtime  insulin lispro (ADMELOG) corrective regimen sliding scale   SubCutaneous three times a day before meals  levETIRAcetam  Solution 500 milliGRAM(s) Oral two times a day  midodrine. 2.5 milliGRAM(s) Oral three times a day PRN  pantoprazole  Injectable 40 milliGRAM(s) IV Push every 12 hours  polyethylene glycol 3350 17 Gram(s) Oral daily  senna 2 Tablet(s) Oral at bedtime  silver sulfADIAZINE 1% Cream 1 Application(s) Topical every 12 hours  sodium chloride 0.9% Bolus. 100 milliLiter(s) IV Bolus every 5 minutes PRN    Mode: AC/ CMV (Assist Control/ Continuous Mandatory Ventilation), RR (machine): 14, TV (machine): 450, FiO2: 40, PEEP: 8, ITime: 0.8, MAP: 13, PIP: 25  HEALTH ISSUES - PROBLEM Dx:  Sepsis    Acute UTI    History of intracranial hemorrhage    Palliative care by specialist    Anemia    MICHELLE (acute kidney injury)           Patient is a 63y old  Male who presents with a chief complaint of Abnormal labs (14 Jun 2023 10:19)    INTERVAL HPI/OVERNIGHT EVENTS: None    T(C): 37.1 (06-14-23 @ 05:00), Max: 38.3 (06-13-23 @ 18:21)  HR: 77 (06-14-23 @ 09:10) (77 - 89)  BP: 102/51 (06-14-23 @ 09:10) (93/50 - 115/59)  RR: 18 (06-14-23 @ 09:10) (18 - 18)  SpO2: 100% (06-14-23 @ 09:10) (98% - 100%)  Wt(kg): --Vital Signs Last 24 Hrs  T(C): 37.1 (14 Jun 2023 05:00), Max: 38.3 (13 Jun 2023 18:21)  T(F): 98.7 (14 Jun 2023 05:00), Max: 101 (13 Jun 2023 18:21)  HR: 77 (14 Jun 2023 09:10) (77 - 89)  BP: 102/51 (14 Jun 2023 09:10) (93/50 - 115/59)  BP(mean): --  RR: 18 (14 Jun 2023 09:10) (18 - 18)  SpO2: 100% (14 Jun 2023 09:10) (98% - 100%)    Parameters below as of 14 Jun 2023 09:10  Patient On (Oxygen Delivery Method): ventilator    O2 Concentration (%): 40    PHYSICAL EXAM:  GENERAL: Trach and peg  HEAD:  Atraumatic, Normocephalic  EYES: EOMI, PERRLA, conjunctiva and sclera clear  ENMT: No tonsillar erythema, exudates, or enlargement; Moist mucous membranes, Good dentition, No lesions  NECK: Trach  NERVOUS SYSTEM:  Alert & Oriented X0  CHEST/LUNG: Decreased breath sounds on Left lung  HEART: Regular rate and rhythm; No murmurs, rubs, or gallops  ABDOMEN: PEG  EXTREMITIES:  2+ Peripheral Pulses, No clubbing, cyanosis, or edema  LYMPH: No lymphadenopathy noted  SKIN: No rashes or lesions      acetaminophen     Tablet .. 650 milliGRAM(s) Oral every 6 hours PRN  acetylcysteine 20%  Inhalation 4 milliLiter(s) Inhalation every 6 hours  albuterol/ipratropium for Nebulization 3 milliLiter(s) Nebulizer every 6 hours  aMIOdarone    Tablet 200 milliGRAM(s) Oral daily  aMIOdarone    Tablet   Oral   aspirin  chewable 81 milliGRAM(s) Oral daily  atorvastatin 80 milliGRAM(s) Oral at bedtime  buMETAnide Injectable 2 milliGRAM(s) IV Push every 12 hours  ceftolozane/tazobactam IVPB 450 milliGRAM(s) IV Intermittent every 8 hours  ceftolozane/tazobactam IVPB 2250 milliGRAM(s) IV Intermittent once  chlorhexidine 0.12% Liquid 15 milliLiter(s) Oral Mucosa two times a day  chlorhexidine 2% Cloths 1 Application(s) Topical <User Schedule>  dextrose 5%. 1000 milliLiter(s) IV Continuous <Continuous>  dextrose 5%. 1000 milliLiter(s) IV Continuous <Continuous>  dextrose 50% Injectable 25 Gram(s) IV Push once  dextrose 50% Injectable 25 Gram(s) IV Push once  dextrose 50% Injectable 12.5 Gram(s) IV Push once  dextrose Oral Gel 15 Gram(s) Oral once PRN  diltiazem    milliGRAM(s) Oral daily  fluconAZOLE   40 mG/mL Suspension 200 milliGRAM(s) Enteral Tube every 24 hours  glucagon  Injectable 1 milliGRAM(s) IntraMuscular once  heparin   Injectable 5000 Unit(s) SubCutaneous every 12 hours  insulin glargine Injectable (LANTUS) 10 Unit(s) SubCutaneous at bedtime  insulin lispro (ADMELOG) corrective regimen sliding scale   SubCutaneous three times a day before meals  levETIRAcetam  Solution 500 milliGRAM(s) Oral two times a day  midodrine. 2.5 milliGRAM(s) Oral three times a day PRN  pantoprazole  Injectable 40 milliGRAM(s) IV Push every 12 hours  polyethylene glycol 3350 17 Gram(s) Oral daily  senna 2 Tablet(s) Oral at bedtime  silver sulfADIAZINE 1% Cream 1 Application(s) Topical every 12 hours  sodium chloride 0.9% Bolus. 100 milliLiter(s) IV Bolus every 5 minutes PRN    Mode: AC/ CMV (Assist Control/ Continuous Mandatory Ventilation), RR (machine): 14, TV (machine): 450, FiO2: 40, PEEP: 8, ITime: 0.8, MAP: 13, PIP: 25  HEALTH ISSUES - PROBLEM Dx:  Sepsis    Acute UTI    History of intracranial hemorrhage    Palliative care by specialist    Anemia    MICHELLE (acute kidney injury)

## 2023-06-14 NOTE — CHART NOTE - NSCHARTNOTEFT_GEN_A_CORE
PALLIATIVE MEDICINE INTERDISCIPLINARY TEAM NOTE    Provider:                                            Met with: [   ] Patient  [ x  ] Family  [   ] Other:    Primary Language: [ x  ] English [   ] Other*:                      *Interpretation provided by:    SUPPORT DIAGNOSES            (Check all that apply)    [   ] EOL issues  [ x  ] Advanced Illness  [   ] Cultural / spiritual concerns  [  x ] Pain / suffering  [   ] Dementia / AMS  [   ] Other:  [   ] AD issues  [   ] Grief / loss / sadness  [   ] Discharge issues  [ x  ] Distress / coping    PSYCHOSOCIAL ASSESSMENT OF PATIENT         (Check all that apply)    [ x  ] Initial Assessment            [   ] Reassessment          [   ] Not Applicable this visit    Pain/suffering acuity:  [   ] None to mild (0-3)           [   ] Moderate (4-6)        [   ] High (7-10)  [ x ] unable to assess     Mental Status:  [   ] Alert/oriented (x3)          [   ] Confused/Altered(x2/x1)         [ x  ] Non-resp: trach/peg     Functional status:  [   ] Independent w ADLs      [   ] Needs Assistance             [ x  ] Bedbound/Full Care    Coping:  [   ] Coping well                     [   ] Coping w/difficulty            [   ] Poor coping     [ x ] unable to assess     Support system:  [  x ] Strong                              [   ] Adequate                        [   ] Inadequate      Past history and medications for:     [ ] Anxiety       [ ] Depression    [ ] Sleep disorders       SERVICE PROVIDED  [   ]Discharge support / facilitation  [   ]AD / goals of care counseling                                  [   ]EOL / death / bereavement counseling  [  x ]Counseling / support                                                [   ] Family meeting  [   ]Prayer / sacrament / ritual                                      [   ] Referral   [   ]Other                                                                       NOTE and Plan of Care (PoC):    patient is a 64 y/o M with pmhx of ICH s/p trach and peg, HTN, HLD, T2DM, CAD, presenting from NH for eval of abnormal labs. Called and spoke with patient's wife Patricio. Educated her on the role of palliative care SW and our service. We discussed difference between hospice and palliative care at her request. She noted that she has been seeing patient has been looking uncomfortable due to pain. For now, she wished for patient's symptoms to be managed, and on going medical care. She is aware of what hospice is and she is not interested in it at this time. All questions answered. contact info provided. support rendered. will follow. [phone] PALLIATIVE MEDICINE INTERDISCIPLINARY TEAM NOTE    Provider:                                            Met with: [   ] Patient  [ x  ] Family  [   ] Other:    Primary Language: [ x  ] English [   ] Other*:                      *Interpretation provided by:    SUPPORT DIAGNOSES            (Check all that apply)    [   ] EOL issues  [ x  ] Advanced Illness  [   ] Cultural / spiritual concerns  [  x ] Pain / suffering  [   ] Dementia / AMS  [   ] Other:  [   ] AD issues  [   ] Grief / loss / sadness  [   ] Discharge issues  [ x  ] Distress / coping    PSYCHOSOCIAL ASSESSMENT OF PATIENT         (Check all that apply)    [ x  ] Initial Assessment            [   ] Reassessment          [   ] Not Applicable this visit    Pain/suffering acuity:  [   ] None to mild (0-3)           [   ] Moderate (4-6)        [   ] High (7-10)  [ x ] unable to assess     Mental Status:  [   ] Alert/oriented (x3)          [   ] Confused/Altered(x2/x1)         [ x  ] Non-resp: trach/peg     Functional status:  [   ] Independent w ADLs      [   ] Needs Assistance             [ x  ] Bedbound/Full Care    Coping:  [   ] Coping well                     [   ] Coping w/difficulty            [   ] Poor coping     [ x ] unable to assess     Support system:  [  x ] Strong                              [   ] Adequate                        [   ] Inadequate      Past history and medications for:     [ ] Anxiety       [ ] Depression    [ ] Sleep disorders       SERVICE PROVIDED  [   ]Discharge support / facilitation  [   ]AD / goals of care counseling                                  [   ]EOL / death / bereavement counseling  [  x ]Counseling / support                                                [   ] Family meeting  [   ]Prayer / sacrament / ritual                                      [   ] Referral   [   ]Other                                                                       NOTE and Plan of Care (PoC):    patient is a 62 y/o M with pmhx of ICH s/p trach and peg, HTN, HLD, T2DM, CAD, presenting from NH for eval of abnormal labs. Called and spoke with patient's wife Patricio. Educated her on the role of palliative care SW and our service. We discussed difference between hospice and palliative care at her request. She noted that she has been seeing patient has been looking uncomfortable due to pain. For now, she wished for patient's symptoms to be managed, and on going medical care. She is aware of what hospice is and she is not interested in it at this time. All questions answered. contact info provided. support rendered.     As goals are clear, palliative care will sign off. Please re-consult PRN [id number] PALLIATIVE MEDICINE INTERDISCIPLINARY TEAM NOTE    Provider:                                            Met with: [   ] Patient  [ x  ] Family  [   ] Other:    Primary Language: [ x  ] English [   ] Other*:                      *Interpretation provided by:    SUPPORT DIAGNOSES            (Check all that apply)    [   ] EOL issues  [ x  ] Advanced Illness  [   ] Cultural / spiritual concerns  [  x ] Pain / suffering  [   ] Dementia / AMS  [   ] Other:  [   ] AD issues  [   ] Grief / loss / sadness  [   ] Discharge issues  [ x  ] Distress / coping    PSYCHOSOCIAL ASSESSMENT OF PATIENT         (Check all that apply)    [ x  ] Initial Assessment            [   ] Reassessment          [   ] Not Applicable this visit    Pain/suffering acuity:  [   ] None to mild (0-3)           [   ] Moderate (4-6)        [   ] High (7-10)  [ x ] unable to assess     Mental Status:  [   ] Alert/oriented (x3)          [   ] Confused/Altered(x2/x1)         [ x  ] Non-resp: trach/peg     Functional status:  [   ] Independent w ADLs      [   ] Needs Assistance             [ x  ] Bedbound/Full Care    Coping:  [   ] Coping well                     [   ] Coping w/difficulty            [   ] Poor coping     [ x ] unable to assess     Support system:  [  x ] Strong                              [   ] Adequate                        [   ] Inadequate      Past history and medications for:     [ ] Anxiety       [ ] Depression    [ ] Sleep disorders       SERVICE PROVIDED  [   ]Discharge support / facilitation  [   ]AD / goals of care counseling                                  [   ]EOL / death / bereavement counseling  [  x ]Counseling / support                                                [   ] Family meeting  [   ]Prayer / sacrament / ritual                                      [   ] Referral   [   ]Other                                                                       NOTE and Plan of Care (PoC):    patient is a 64 y/o M with pmhx of ICH s/p trach and peg, HTN, HLD, T2DM, CAD, presenting from NH for eval of abnormal labs. Called and spoke with patient's wife Patricio. Educated her on the role of palliative care SW and our service. We discussed difference between hospice and palliative care at her request. She noted that she has been seeing patient has been looking uncomfortable due to pain. For now, she wished for patient's symptoms to be managed, and on going medical care. She is aware of what hospice is and she is not interested in it at this time. All questions answered. contact info provided. support rendered.     will follow for symptom mgmt. [phone]

## 2023-06-14 NOTE — PROGRESS NOTE ADULT - SUBJECTIVE AND OBJECTIVE BOX
NUTRITION SUPPORT TEAM  -  PROGRESS NOTE   resting comfortably  remain vent via trach  on peg tube feed  abdomen soft +peg tube in place sited c/d/i  REVIEW OF SYSTEMS:  Negative except as noted above.     VITALS:  T(F): 98.7 (06-14 @ 05:00), Max: 98.7 (06-14 @ 05:00)  HR: 77 (06-14 @ 09:10) (77 - 77)  BP: 102/51 (06-14 @ 09:10) (102/51 - 107/59)  RR: 18 (06-14 @ 09:10) (18 - 18)  SpO2: 100% (06-14 @ 09:10) (98% - 100%)  Mode: AC/ CMV (Assist Control/ Continuous Mandatory Ventilation)  RR (machine): 14  TV (machine): 450  FiO2: 40  PEEP: 8  ITime: 0.8  MAP: 10  PIP: 24    HEIGHT/WEIGHT/BMI:   Height (cm): 170.2 (06-01), 180.3 (10-04)  Weight (kg): 93.3 (06-05), 66.4 (10-04)  BMI (kg/m2): 32.2 (06-05), 22.9 (06-01), 20.4 (10-04)  06-13-23 @ 07:01  -  06-14-23 @ 07:00  --------------------------------------------------------  IN:    Peptamen A.F.: 750 mL  Total IN: 750 mL  OUT:  Total OUT: 0 mL  Total NET: 750 mL  MEDICATIONS:   acetaminophen     Tablet .. 650 milliGRAM(s) Oral every 6 hours PRN  acetylcysteine 20%  Inhalation 4 milliLiter(s) Inhalation every 6 hours  albuterol/ipratropium for Nebulization 3 milliLiter(s) Nebulizer every 6 hours  aMIOdarone    Tablet 200 milliGRAM(s) Oral daily  aMIOdarone    Tablet   Oral   aspirin  chewable 81 milliGRAM(s) Oral daily  atorvastatin 80 milliGRAM(s) Oral at bedtime  buMETAnide Injectable 2 milliGRAM(s) IV Push every 12 hours  chlorhexidine 0.12% Liquid 15 milliLiter(s) Oral Mucosa two times a day  chlorhexidine 2% Cloths 1 Application(s) Topical <User Schedule>  diltiazem    milliGRAM(s) Oral daily  fluconAZOLE   40 mG/mL Suspension 200 milliGRAM(s) Enteral Tube every 24 hours  gentamicin   IVPB 60 milliGRAM(s) IV Intermittent every 24 hours  gentamicin   IVPB      glucagon  Injectable 1 milliGRAM(s) IntraMuscular once  heparin   Injectable 5000 Unit(s) SubCutaneous every 12 hours  insulin glargine Injectable (LANTUS) 10 Unit(s) SubCutaneous at bedtime  insulin lispro (ADMELOG) corrective regimen sliding scale   SubCutaneous three times a day before meals  levETIRAcetam  Solution 500 milliGRAM(s) Oral two times a day  midodrine. 2.5 milliGRAM(s) Oral three times a day PRN  pantoprazole  Injectable 40 milliGRAM(s) IV Push every 12 hours  polyethylene glycol 3350 17 Gram(s) Oral daily  senna 2 Tablet(s) Oral at bedtime  silver sulfADIAZINE 1% Cream 1 Application(s) Topical every 12 hours  sodium chloride 0.9% Bolus. 100 milliLiter(s) IV Bolus every 5 minutes PRN    LABS:                         7.2    9.69  )-----------( 272      ( 13 Jun 2023 19:14 )             23.3     134<L>  |  98  |  65<HH>  ----------------------------<  152<H>          (06-13-23 @ 19:14)  4.8   |  26  |  2.4<H>    Ca    8.3<L>          (06-13-23 @ 19:14)  TPro  5.2<L>  /  Alb  1.9<L>  /  TBili  0.4  /  DBili  x   /  AST  58<H>  /  ALT  15  /  AlkPhos  208<H>       06-13-23 @ 19:14  Vitamin D, 25-Hydroxy: 70 ng/mL (06-03 @ 06:50)  Folate: >20.0 ng/mL (06-03 @ 06:50)  Vitamin B12, Serum: 1908 pg/mL (06-06 @ 10:40)  Zinc Level, Plasma: 54 ug/dL (06-03 @ 06:50)  Blood Glucose (Past 24 hours):  90 mg/dL (06-14 @ 05:46)  183 mg/dL (06-13 @ 21:27)  142 mg/dL (06-13 @ 16:53)  175 mg/dL (06-13 @ 11:06)    DIET:   Diet, NPO with Tube Feed:   Tube Feeding Modality: Gastrostomy  Peptamen A.F. Formula  Total Volume for 24 Hours (mL): 1500  Bolus  Total Volume of Bolus (mL):  375  Tube Feed Frequency: Every 6 hours   Tube Feed Start Time: 16:00  Bolus Feed Rate (mL per Hour): 500   Bolus Feed Duration (in Hours): 0.75  Free Water Flush Instructions:  flush GT with 30 ml water syringe push before & after each feed (06-02-23 @ 15:58) [Active]  RADIOLOGY:   < from: Xray Chest 1 View- PORTABLE-Urgent (Xray Chest 1 View- PORTABLE-Urgent .) (06.13.23 @ 10:50) >  Impression:  Further significant increase in left-sided opacities/effusion with   minimal aeration of the left lung. Right-sided opacities redemonstrated

## 2023-06-14 NOTE — PROGRESS NOTE ADULT - SUBJECTIVE AND OBJECTIVE BOX
Nephrology progress note    Patient is seen and examined, events over the last 24 h noted .  Lying in bed trached      Allergies:  penicillin (Other)    Hospital Medications:   MEDICATIONS  (STANDING):    acetylcysteine 20%  Inhalation 4 milliLiter(s) Inhalation every 6 hours  albuterol/ipratropium for Nebulization 3 milliLiter(s) Nebulizer every 6 hours  aMIOdarone    Tablet 200 milliGRAM(s) Oral daily  aspirin  chewable 81 milliGRAM(s) Oral daily  atorvastatin 80 milliGRAM(s) Oral at bedtime  buMETAnide Injectable 2 milliGRAM(s) IV Push every 12 hours  diltiazem    milliGRAM(s) Oral daily  fluconAZOLE   40 mG/mL Suspension 200 milliGRAM(s) Enteral Tube every 24 hours  gentamicin   IVPB 60 milliGRAM(s) IV Intermittent every 24 hours    glucagon  Injectable 1 milliGRAM(s) IntraMuscular once  heparin   Injectable 5000 Unit(s) SubCutaneous every 12 hours  insulin glargine Injectable (LANTUS) 10 Unit(s) SubCutaneous at bedtime  insulin lispro (ADMELOG) corrective regimen sliding scale   SubCutaneous three times a day before meals  levETIRAcetam  Solution 500 milliGRAM(s) Oral two times a day  pantoprazole  Injectable 40 milliGRAM(s) IV Push every 12 hours  polyethylene glycol 3350 17 Gram(s) Oral daily  senna 2 Tablet(s) Oral at bedtime  silver sulfADIAZINE 1% Cream 1 Application(s) Topical every 12 hours        VITALS:  T(F): 98.7 (23 @ 05:00), Max: 101 (23 @ 18:21)  HR: 77 (23 @ 05:00)  BP: 107/59 (23 @ 05:00)  RR: 18 (23 @ 05:00)  SpO2: 98% (23 @ 05:00)       @ :  -   @ 07:00  --------------------------------------------------------  IN: 375 mL / OUT: 1000 mL / NET: -625 mL     @ 07: @ 07:00  --------------------------------------------------------  IN: 750 mL / OUT: 0 mL / NET: 750 mL          PHYSICAL EXAM:  Constitutional: NAD  Respiratory: CTAB, / trached   Cardiovascular: S1, S2, RRR  Gastrointestinal: BS+, soft, NT/ND  Extremities: No cyanosis or clubbing. No peripheral edema  :  No teixeira.   Skin: No rashes    LABS:      134<L>  |  98  |  65<HH>  ----------------------------<  152<H>  4.8   |  26  |  2.4<H>  Creatinine Trend: 2.4<--, 2.4<--, 2.0<--, 2.3<--, 2.0<--, 1.9<--  Ca    8.3<L>      2023 19:14  Mg     2.4         TPro  5.2<L>  /  Alb  1.9<L>  /  TBili  0.4  /  DBili      /  AST  58<H>  /  ALT  15  /  AlkPhos  208<H>                            7.2    9.69  )-----------( 272      ( 2023 19:14 )             23.3       Urine Studies:  Urinalysis Basic - ( 2023 18:25 )    Color: Yellow / Appearance: Slightly Turbid / S.016 / pH:   Gluc:  / Ketone: Trace  / Bili: Small / Urobili: 6 mg/dL   Blood:  / Protein: 100 mg/dL / Nitrite: Negative   Leuk Esterase: Large / RBC: 72 /HPF /  /HPF   Sq Epi:  / Non Sq Epi:  / Bacteria: Few          Iron 51, TIBC 111, %sat 46      [23 @ 10:40]  Ferritin       [23 @ 10:40]  Vitamin D (25OH) 70      [23 @ 06:50]    HBsAb <3.0      [23 @ 22:50]  HBsAb Nonreact      [23 @ 22:50]  HBsAg Nonreact      [23 @ 22:50]  HBcAb Nonreact      [23 @ 22:50]  HCV 0.09, Nonreact      [21 @ 09:04]  HIV Nonreact      [23 @ 16:00]  HIV Nonreact      [23 @ 12:20]        RADIOLOGY & ADDITIONAL STUDIES:

## 2023-06-15 NOTE — PROGRESS NOTE ADULT - ASSESSMENT
64 yo M with  PMH of ICH (2021) s/p trach and PEG, HTN, HLD, T2DM, CAD s/p stents, Recurrent C diff, BIBEMS from [facility] for abnormal labs. Patient non-verbal at baseline - limited history. Per NH chart  have a BUN greater than 200 and creatinine of 4.3 on outpatient labs. Outpatient CXR also w/ new L sided pleural effusion. With EMS patient was also found to be in irregular rhythm, no known history of A-fib or a flutter.  MICHELLE on CKD 3a - severe azotemia  likely due to GIB / hypotension  Started HD on 6/1/23  Acute anemia d/t UGIB  Troponemia  Lactic acidosis resolved   DKA resolved   Afib   - s/p hd yesterday   - needs daily cr   - continue bumex , document accurate UO   - BP noted /  midodrine  5 q8   - repeat h/h / last sat elevated   - last  phos noted / no binders   - hold on TDC placement   - very poor prognosis   will follow

## 2023-06-15 NOTE — PROGRESS NOTE ADULT - SUBJECTIVE AND OBJECTIVE BOX
Over Night Events: events noted, ventr dependant, blood secretions, afebrile on 40%    PHYSICAL EXAM    ICU Vital Signs Last 24 Hrs  T(C): 36.7 (15 Deepak 2023 05:00), Max: 36.8 (14 Jun 2023 20:35)  T(F): 98.1 (15 Deepak 2023 05:00), Max: 98.2 (14 Jun 2023 20:35)  HR: 83 (15 Deepak 2023 05:00) (69 - 92)  BP: 109/57 (15 Deepak 2023 05:00) (98/45 - 137/60)  RR: 18 (15 Deepak 2023 05:00) (18 - 20)  SpO2: 100% (15 Deepak 2023 05:00) (99% - 100%)    O2 Parameters below as of 14 Jun 2023 20:35  Patient On (Oxygen Delivery Method): ventilator            General: ill looking  HEENT: trach  Lungs: dec bs l base  Cardiovascular: Regular   Abdomen: Soft, Positive BS  not following commands    06-14-23 @ 07:01  -  06-15-23 @ 07:00  --------------------------------------------------------  IN:    Enteral Tube Flush: 30 mL    Peptamen A.F.: 375 mL  Total IN: 405 mL    OUT:    Indwelling Catheter - Urethral (mL): 100 mL    Other (mL): 1000 mL  Total OUT: 1100 mL    Total NET: -695 mL          LABS:                          7.2    9.69  )-----------( 272      ( 13 Jun 2023 19:14 )             23.3                                               06-14    141  |  105  |  39<H>  ----------------------------<  158<H>  4.3   |  28  |  1.5    Ca    8.1<L>      14 Jun 2023 16:56    TPro  5.2<L>  /  Alb  1.9<L>  /  TBili  0.4  /  DBili  x   /  AST  58<H>  /  ALT  15  /  AlkPhos  208<H>  06-13                                                                                           LIVER FUNCTIONS - ( 13 Jun 2023 19:14 )  Alb: 1.9 g/dL / Pro: 5.2 g/dL / ALK PHOS: 208 U/L / ALT: 15 U/L / AST: 58 U/L / GGT: x                                                  Culture - Blood (collected 13 Jun 2023 19:14)  Source: .Blood Blood  Preliminary Report (15 Deepak 2023 02:02):    No growth to date.                                                   Mode: AC/ CMV (Assist Control/ Continuous Mandatory Ventilation)  RR (machine): 14  TV (machine): 450  FiO2: 40  PEEP: 8  ITime: 0.8  MAP: 14  PIP: 27                                          MEDICATIONS  (STANDING):  acetylcysteine 20%  Inhalation 4 milliLiter(s) Inhalation every 6 hours  albuterol/ipratropium for Nebulization 3 milliLiter(s) Nebulizer every 6 hours  aMIOdarone    Tablet 200 milliGRAM(s) Oral daily  aMIOdarone    Tablet   Oral   aspirin  chewable 81 milliGRAM(s) Oral daily  atorvastatin 80 milliGRAM(s) Oral at bedtime  buMETAnide Injectable 2 milliGRAM(s) IV Push every 12 hours  ceftolozane/tazobactam IVPB 450 milliGRAM(s) IV Intermittent every 8 hours  chlorhexidine 0.12% Liquid 15 milliLiter(s) Oral Mucosa two times a day  chlorhexidine 2% Cloths 1 Application(s) Topical <User Schedule>  dextrose 5%. 1000 milliLiter(s) (100 mL/Hr) IV Continuous <Continuous>  dextrose 5%. 1000 milliLiter(s) (50 mL/Hr) IV Continuous <Continuous>  dextrose 50% Injectable 25 Gram(s) IV Push once  dextrose 50% Injectable 25 Gram(s) IV Push once  dextrose 50% Injectable 12.5 Gram(s) IV Push once  diltiazem    milliGRAM(s) Oral daily  fluconAZOLE   40 mG/mL Suspension 200 milliGRAM(s) Enteral Tube every 24 hours  glucagon  Injectable 1 milliGRAM(s) IntraMuscular once  heparin   Injectable 5000 Unit(s) SubCutaneous every 12 hours  insulin glargine Injectable (LANTUS) 10 Unit(s) SubCutaneous at bedtime  insulin lispro (ADMELOG) corrective regimen sliding scale   SubCutaneous three times a day before meals  levETIRAcetam  Solution 500 milliGRAM(s) Oral two times a day  pantoprazole  Injectable 40 milliGRAM(s) IV Push every 12 hours  polyethylene glycol 3350 17 Gram(s) Oral daily  senna 2 Tablet(s) Oral at bedtime  silver sulfADIAZINE 1% Cream 1 Application(s) Topical every 12 hours    MEDICATIONS  (PRN):  acetaminophen     Tablet .. 650 milliGRAM(s) Oral every 6 hours PRN Temp greater or equal to 38C (100.4F), Mild Pain (1 - 3)  dextrose Oral Gel 15 Gram(s) Oral once PRN Blood Glucose LESS THAN 70 milliGRAM(s)/deciliter  midodrine. 2.5 milliGRAM(s) Oral three times a day PRN SBP < 110  sodium chloride 0.9% Bolus. 100 milliLiter(s) IV Bolus every 5 minutes PRN SBP LESS THAN or EQUAL to 80 mmHg

## 2023-06-15 NOTE — PROGRESS NOTE ADULT - ASSESSMENT
63 year old man with history of ICH s/p trach and PEG in 2021, CAD, recurrent CDIff brought in for elevated BUN/creatinine now on dialysis. Palliative care reconsulted for GOC.     Patient seen at bedide, unable to participate in exam. He did not appear uncomfortable or distressed on exam, but per primary team and family, patient appears uncomfortable times.  Will provide recommendations for symptom management PRN and sign off.    Education about palliative care provided to patient/family.  See Recs below.    Please call [phone] with questions or concerns 24/7.   We will sign off.

## 2023-06-15 NOTE — CONSULT NOTE ADULT - ASSESSMENT
multiple DTI wounds  - continue local wound care BID. wash with soap and water, apply santyl and hydrogel to areas with eschar, cover with abd/tape. consider allovyn for heel/ankle  - off loading, position changes  - pain control

## 2023-06-15 NOTE — PROGRESS NOTE ADULT - SUBJECTIVE AND OBJECTIVE BOX
pt seen and examined.     My notes supersede resident's notes in case of discrepancy       ROS: no cp, no sob, no n/v, no fever    Vital Signs Last 24 Hrs  T(C): 37.3 (15 Deepak 2023 13:02), Max: 37.3 (15 Deepak 2023 09:47)  T(F): 99.1 (15 Deepak 2023 13:02), Max: 99.2 (15 Deepak 2023 09:47)  HR: 104 (15 Deepak 2023 13:02) (79 - 104)  BP: 130/85 (15 Deepak 2023 13:02) (98/45 - 130/85)  BP(mean): --  RR: 20 (15 Deepak 2023 13:02) (18 - 20)  SpO2: 98% (15 Deepak 2023 13:02) (98% - 100%)    Parameters below as of 15 Deepak 2023 13:02  Patient On (Oxygen Delivery Method): ventilator    physical exam:  no acute disteress, pt is not verbal, minimal response, trach and peg in place, right hand has contracture. multiple pressure ulcers, functional paraplegia.     MEDICATIONS  (STANDING):  acetylcysteine 20%  Inhalation 4 milliLiter(s) Inhalation every 6 hours  albuterol/ipratropium for Nebulization 3 milliLiter(s) Nebulizer every 6 hours  aMIOdarone    Tablet 200 milliGRAM(s) Oral daily  aMIOdarone    Tablet   Oral   aspirin  chewable 81 milliGRAM(s) Oral daily  atorvastatin 80 milliGRAM(s) Oral at bedtime  buMETAnide Injectable 2 milliGRAM(s) IV Push every 12 hours  ceftolozane/tazobactam IVPB 450 milliGRAM(s) IV Intermittent every 8 hours  chlorhexidine 0.12% Liquid 15 milliLiter(s) Oral Mucosa two times a day  chlorhexidine 2% Cloths 1 Application(s) Topical <User Schedule>  dextrose 5%. 1000 milliLiter(s) (100 mL/Hr) IV Continuous <Continuous>  dextrose 5%. 1000 milliLiter(s) (50 mL/Hr) IV Continuous <Continuous>  dextrose 50% Injectable 25 Gram(s) IV Push once  dextrose 50% Injectable 25 Gram(s) IV Push once  dextrose 50% Injectable 12.5 Gram(s) IV Push once  diltiazem    milliGRAM(s) Oral daily  fluconAZOLE   40 mG/mL Suspension 200 milliGRAM(s) Enteral Tube every 24 hours  glucagon  Injectable 1 milliGRAM(s) IntraMuscular once  heparin   Injectable 5000 Unit(s) SubCutaneous every 12 hours  insulin glargine Injectable (LANTUS) 10 Unit(s) SubCutaneous at bedtime  insulin lispro (ADMELOG) corrective regimen sliding scale   SubCutaneous three times a day before meals  levETIRAcetam  Solution 500 milliGRAM(s) Oral two times a day  pantoprazole  Injectable 40 milliGRAM(s) IV Push every 12 hours  polyethylene glycol 3350 17 Gram(s) Oral daily  senna 2 Tablet(s) Oral at bedtime  silver sulfADIAZINE 1% Cream 1 Application(s) Topical every 12 hours    MEDICATIONS  (PRN):  acetaminophen     Tablet .. 650 milliGRAM(s) Oral every 6 hours PRN Temp greater or equal to 38C (100.4F), Mild Pain (1 - 3)  dextrose Oral Gel 15 Gram(s) Oral once PRN Blood Glucose LESS THAN 70 milliGRAM(s)/deciliter  midodrine. 2.5 milliGRAM(s) Oral three times a day PRN SBP < 110  sodium chloride 0.9% Bolus. 100 milliLiter(s) IV Bolus every 5 minutes PRN SBP LESS THAN or EQUAL to 80 mmHg                          6.8    6.33  )-----------( 308      ( 15 Deepak 2023 08:41 )             22.4     06-15    141  |  106  |  44<H>  ----------------------------<  188<H>  4.6   |  26  |  1.9<H>    Ca    8.3<L>      15 Deepak 2023 08:41  Mg     2.1     06-15    TPro  5.1<L>  /  Alb  1.9<L>  /  TBili  0.3  /  DBili  x   /  AST  40  /  ALT  13  /  AlkPhos  271<H>  06-15    Procalcitonin, Serum: 1.38 ng/mL [0.02 - 0.10] (06-12-23 @ 09:44)  Procalcitonin, Serum: 1.12 ng/mL [0.02 - 0.10] (06-11-23 @ 21:02)  Ferritin, Serum: 1956 ng/mL [30 - 400] (06-06-23 @ 10:40)  Procalcitonin, Serum: 2.38 ng/mL [0.02 - 0.10] (05-31-23 @ 05:50)    Culture - Blood (collected 13 Jun 2023 19:14)  Source: .Blood Blood  Preliminary Report (15 Deepak 2023 02:02):    No growth to date.    a/p  62 yo M with  PMH of ICH (2021) s/p trach and PEG, HTN, HLD, T2DM, CAD s/p stents, Recurrent C diff, BIBEMS from [facility] for abnormal labs. Patient non-verbal at baseline - limited history. Per NH chart  have a BUN greater than 200 and creatinine of 4.3 on outpatient labs. Outpatient CXR also w/ new L sided pleural effusion. In ED, patient was hypotensive with Afib with RVR, found to be in acure renal failure, anemic with elevated trops. He was started on Amiodarone GTT, PPI GTT, gastric lavage with coffee ground secretions, started on broad spectrum abx and admitted to MICU  While in MICU, found to be in DKA, s/p insulin drip, MICHELLE started on HD, acute anemia s/p PRBC transfusion, NSTEMI with downtrending trops, now downgraded to SDU -> floor     # Acute Renal Failure, started on HD 6/1  nephro following, cont HD for now, holding off tesio per nephro     # Candida tropicalis UTI  s/p cefepime and vanc   Blood clts neg, URine with Candida tropicalis   on  fluconazole, changed from IV to PO via gtube     # Acute anemia , suspected gib, not a candidate for any endoscopy.   - HgB 6.7 (baseline 8) s/p 2U pRBC - improved to 9.9, now dropped again, transfuse 1 more unit and followup   - evaluated by GI - no evidence of GI bleed (PEG tube flushed with return of bile and no blood)  - Monitor H/H closely. Hb dropped to 6.6 yesterday >> trend CBC, Transfuse PRN, F/U GI  - Active T+S    # atelectasis/ effusion  ? ct chst ordered  per pulm rec     # electrolyte imbalance : sodium and potassium wnl,  06-15    141  |  106  |  44<H>  ----------------------------<  188<H>  4.6   |  26  |  1.9<H>    Ca    8.3<L>      15 Deepak 2023 08:41  Mg     2.1     06-15    TPro  5.1<L>  /  Alb  1.9<L>  /  TBili  0.3  /  DBili  x   /  AST  40  /  ALT  13  /  AlkPhos  271<H>  06-15      # Afib w/ RVR - now rate controlled   - currently rate controlled s/p amio gtt  - currently on  amiodarone   200 q24H     # CAD s/p stents, NSTEMI  - Trops 1.80-->1.63-->1.57-->1.55  - seen by cardio,  c/w asa and statin, medical management     # DM with  DKA now resolved  - s/p insulin drip, now on basal/bolus and tube feeds. Monitor FS   CAPILLARY BLOOD GLUCOSE    POCT Blood Glucose.: 156 mg/dL (15 Deepak 2023 11:31)  POCT Blood Glucose.: 172 mg/dL (15 Deepak 2023 07:45)  POCT Blood Glucose.: 142 mg/dL (15 Deepak 2023 05:33)  POCT Blood Glucose.: 193 mg/dL (14 Jun 2023 22:22)  POCT Blood Glucose.: 192 mg/dL (14 Jun 2023 17:28)    # Hx ICH s/p trach and PEG  Bedbound from NH   - c/w keppra 500mg BID for seizure prophylaxis    # VDRF with new onset left sided ? Loculated effusion.   Pulm eval noted, recommend CT     # palliative care, dw palliative care team, they will cont following for symptom management     #Progress Note Handoff  Pending :  ct chest, transfuse 1 unit   Family discussion: team spoke with wife  Disposition: SNF .

## 2023-06-15 NOTE — PROGRESS NOTE ADULT - SUBJECTIVE AND OBJECTIVE BOX
HPI:  64 yo M with  PMH of ICH (2021) s/p trach and PEG, HTN, HLD, T2DM, CAD s/p stents, Recurrent C diff, [facility]IBEMS from [facility] for [facility] labs. Patient non-verbal at baseline - limited history. Per NH chart  have a BUN greater than 200 and creatinine of 4.3 on outpatient labs. Outpatient CXR also w/ new L sided pleural effusion. With EMS patient was also found to be in irregular rhythm, no known history of A-fib or a flutter.  In ED patient had borderline BP, and was in Atrial fibrillation. He was started on amiodarone gtt. Labs showed , Cr. 4.1, Hb 6.6. Trops 1.8. He was given 1U PRBC, gastric lavage revealed coffee ground emesis w/no active bleeding. He was started on PPI drip and GI consulted in ED. He was given IV aztreonam and vancomycin.   Patient being admitted to ICU for management of Sepsis likely from UTI, MICHELLE likely prerenal and NSTEMI.     Interval History  -Patient seen at bedside  -He was unable to participate in exam    ADVANCE DIRECTIVES:    [ ] Full Code [x ] DNR [ ] MOLST [ ] Living Will     DECISION MAKER(s):  [ ] Health Care Proxy(s)  [x ] Surrogate(s)  [ ] Guardian           Name(s): Phone Number(s): Wife Sandrita Bauer    BASELINE (I)ADL(s) (prior to admission):  [place]: [ ]Total  [ ] Moderate [ ]Dependent  Palliative Performance Status Version 2:    20     %    http://npcrc.org/files/news/palliative_performance_scale_ppsv2.pdf    Allergies  penicillin (Other)    Intolerances    MEDICATIONS  (STANDING):  acetylcysteine 20%  Inhalation 4 milliLiter(s) Inhalation every 6 hours  albuterol/ipratropium for Nebulization 3 milliLiter(s) Nebulizer every 6 hours  aMIOdarone    Tablet   Oral   aMIOdarone    Tablet 200 milliGRAM(s) Oral daily  aspirin  chewable 81 milliGRAM(s) Oral daily  atorvastatin 80 milliGRAM(s) Oral at bedtime  buMETAnide Injectable 2 milliGRAM(s) IV Push every 12 hours  ceftolozane/tazobactam IVPB 450 milliGRAM(s) IV Intermittent every 8 hours  chlorhexidine 0.12% Liquid 15 milliLiter(s) Oral Mucosa two times a day  chlorhexidine 2% Cloths 1 Application(s) Topical <User Schedule>  collagenase Ointment 1 Application(s) Topical two times a day  dextrose 5%. 1000 milliLiter(s) (100 mL/Hr) IV Continuous <Continuous>  dextrose 5%. 1000 milliLiter(s) (50 mL/Hr) IV Continuous <Continuous>  dextrose 50% Injectable 25 Gram(s) IV Push once  dextrose 50% Injectable 25 Gram(s) IV Push once  dextrose 50% Injectable 12.5 Gram(s) IV Push once  diltiazem    milliGRAM(s) Oral daily  fluconAZOLE   40 mG/mL Suspension 200 milliGRAM(s) Enteral Tube every 24 hours  glucagon  Injectable 1 milliGRAM(s) IntraMuscular once  heparin   Injectable 5000 Unit(s) SubCutaneous every 12 hours  insulin glargine Injectable (LANTUS) 10 Unit(s) SubCutaneous at bedtime  insulin lispro (ADMELOG) corrective regimen sliding scale   SubCutaneous three times a day before meals  levETIRAcetam  Solution 500 milliGRAM(s) Oral two times a day  pantoprazole  Injectable 40 milliGRAM(s) IV Push every 12 hours  polyethylene glycol 3350 17 Gram(s) Oral daily  senna 2 Tablet(s) Oral at bedtime  silver sulfADIAZINE 1% Cream 1 Application(s) Topical every 12 hours    MEDICATIONS  (PRN):  acetaminophen     Tablet .. 650 milliGRAM(s) Oral every 6 hours PRN Temp greater or equal to 38C (100.4F), Mild Pain (1 - 3)  dextrose Oral Gel 15 Gram(s) Oral once PRN Blood Glucose LESS THAN 70 milliGRAM(s)/deciliter  midodrine. 2.5 milliGRAM(s) Oral three times a day PRN SBP < 110  sodium chloride 0.9% Bolus. 100 milliLiter(s) IV Bolus every 5 minutes PRN SBP LESS THAN or EQUAL to 80 mmHg      PRESENT SYMPTOMS: [x ]Unable to obtain due to poor mentation   Source if other than patient:  [ ]Family   [ ]Team     Pain: [ ]yes [ ]no  QOL impact -   Location -                    Aggravating factors -  Quality -  Radiation -  Timing-  Severity (0-10 scale):  Minimal acceptable level (0-10 scale):     CPOT:  0  https://www.sccm.org/getattachment/orv87j69-0m5p-6j2q-8u0i-1388x9074b3q/Critical-Care-Pain-Observation-Tool-(CPOT)    PAIN AD Score:   http://geriatrictoolkit.[email or web address]/cog/painad.pdf (press ctrl +  left click to view)    Dyspnea:                           [ ]None[ ]Mild [ ]Moderate [ ]Severe     Respiratory Distress Observation Scale (RDOS): 0  A score of 0 to 2 signifies little or no respiratory distress, 3 signifies mild distress, scores 4 to 6 indicate moderate distress, and scores greater than 7 signify severe distress  https://[email or web address]/sites/default/files/PDFS/264148-qanzfrjngtg-hyvpykbu-zsrlnwdmoxh-pvcpb.pdf    Anxiety:                             [ ]None[ ]Mild [ ]Moderate [ ]Severe   Fatigue:                             [ ]None[ ]Mild [ ]Moderate [ ]Severe   Nausea:                             [ ]None[ ]Mild [ ]Moderate [ ]Severe   Loss of appetite:              [ ]None[ ]Mild [ ]Moderate [ ]Severe   Constipation:                    [ ]None[ ]Mild [ ]Moderate [ ]Severe    Other Symptoms:  [ ]All other review of systems negative     Palliative Performance Status Version 2:   10      %    http://[facility].org/files/news/palliative_performance_scale_ppsv2.pdf    PHYSICAL EXAM:  Vital Signs Last 24 Hrs  T(C): 37.3 (15 Deepak 2023 13:02), Max: 37.3 (15 Deepak 2023 09:47)  T(F): 99.1 (15 Deepak 2023 13:02), Max: 99.2 (15 Deepak 2023 09:47)  HR: 104 (15 Deepak 2023 13:02) (79 - 104)  BP: 130/85 (15 Deepak 2023 13:02) (98/45 - 130/85)  BP(mean): --  RR: 20 (15 Deepak 2023 13:02) (18 - 20)  SpO2: 98% (15 Deepak 2023 13:02) (98% - 100%)    Parameters below as of 15 Deepak 2023 13:02  Patient On (Oxygen Delivery Method): ventilator      GENERAL:  [ ] No acute distress [ ]Lethargic  [x ]Unarousable  [ ]Verbal  [ ]Non-Verbal [ ]Cachexia    BEHAVIORAL/PSYCH:  [ ]Alert and Oriented x  [ ] Anxiety [ ] Delirium [ ] Agitation [x ] Calm   EYES: [ ] No scleral icterus [ ] Scleral icterus [ x] Closed  ENMT:  [ ]Dry mouth  [ x]No external oral lesions [ ] No external ear or nose lesions  CARDIOVASCULAR:  [ ]Regular [ ]Irregular [ ]Tachy [x ]Not Tachy  [ ]Bridger [ ] Edema [ ] No edema  PULMONARY:  [ ]Tachypnea  [ ]Audible excessive secretions [x ] No labored breathing [ ] mildly labored breathing [ ] labored breathing  GASTROINTESTINAL: [ ]Soft  [ ]Distended  [x ]Not distended [ ]Non tender [ ]Tender  MUSCULOSKELETAL: [ ]No clubbing [ ] clubbing  [x ] No cyanosis [ ] cyanosis  NEUROLOGIC: [ ]No focal deficits  [ ]Follows commands  [x ]Does not follow commands  [ ]Cognitive impairment  [ ]Dysphagia  [ ]Dysarthria  [ ]Paresis   SKIN: [ ] Jaundiced [ ] Non-jaundiced [ ]Rash [ ]No Rash [ ] Warm [ ] Dry  MISC/LINES: [ ] ET tube [ ] Trach [ ]NGT/OGT [x ]PEG [x ]Barney      LABS: reviewed by me                                   6.8    6.33  )-----------( 308      ( 15 Deepak 2023 08:41 )             22.4       06-15    141  |  106  |  44<H>  ----------------------------<  188<H>  4.6   |  26  |  1.9<H>    Ca    8.3<L>      15 Deepak 2023 08:41  Mg     2.1     06-15    TPro  5.1<L>  /  Alb  1.9<L>  /  TBili  0.3  /  DBili  x   /  AST  40  /  ALT  13  /  AlkPhos  271<H>  06-15          RADIOLOGY & ADDITIONAL STUDIES: reviewed by me    < from: Xray Chest 1 View-PORTABLE IMMEDIATE (Xray Chest 1 View-PORTABLE IMMEDIATE .) (06.15.23 @ 10:54) >  Impression:    Bilateral opacifications and effusions with cardiomegaly.    Grossly unchanged. Support devices as described.    < end of copied text >    < from: 12 Lead ECG (06.12.23 @ 13:30) >  Ventricular Rate 103 BPM    Atrial Rate 103 BPM    P-R Interval 120 ms    QRS Duration 64 ms    Q-T Interval 322 ms    QTC Calculation(Bazett) 421 ms    P Axis 47 degrees    R Axis 23 degrees    T Axis 26 degrees    Diagnosis Line Sinus tachycardia  Low voltage QRS  Borderline ECG    < end of copied text >        PROTEIN CALORIE MALNUTRITION PRESENT: [ ]mild [ ]moderate [ ]severe [ ]underweight [ ]morbid obesity  https://www.andeal.org/vault/2440/web/files/ONC/Table_Clinical%20Characteristics%20to%20Document%20Malnutrition-White%20JV%20et%20al%392285.pdf    Height (cm): 170.2 (06-01-23 @ 09:07), 180.3 (10-04-22 @ 22:39)  Weight (kg): 102 (05-31-23 @ 06:26), 66.4 (10-04-22 @ 22:39)  BMI (kg/m2): 35.2 (06-01-23 @ 09:07), 31.4 (05-31-23 @ 06:26), 20.4 (10-04-22 @ 22:39)    [ ]PPSV2 < or = to 30% [ ]significant weight loss  [ ]poor nutritional intake  [ ]anasarca      [ ]Artificial Nutrition      Patient and/or family assessed for spiritual and social needs     REFERRALS:   [ ]Chaplaincy  [ ]Hospice  [ ]Child Life  [x ]Social Work  [ ]Case management [ ]Holistic Therapy     Patient discussed with primary medical team MD  Palliative care education provided to patient and/or family

## 2023-06-15 NOTE — CONSULT NOTE ADULT - SUBJECTIVE AND OBJECTIVE BOX
Patient is a 63y old  Male who presents with a chief complaint of Abnormal labs (15 Deepak 2023 15:19)      HPI:  62 yo M with  PMH of ICH (2021) s/p trach and PEG, HTN, HLD, T2DM, CAD s/p stents, Recurrent C diff, BIBEMS from [facility] for abnormal labs. Patient non-verbal at baseline - limited history. Per NH chart  have a BUN greater than 200 and creatinine of 4.3 on outpatient labs. Outpatient CXR also w/ new L sided pleural effusion. With EMS patient was also found to be in irregular rhythm, no known history of A-fib or a flutter.  In ED patient had borderline BP, and was in Atrial fibrillation. He was started on amiodarone gtt. Labs showed , Cr. 4.1, Hb 6.6. Trops 1.8. He was given 1U PRBC, gastric lavage revealed coffee ground emesis w/no active bleeding. He was started on PPI drip and GI consulted in ED. He was given IV aztreonam and vancomycin.   Patient being admitted to ICU for management of Sepsis likely from UTI, MICHELLE likely prerenal and NSTEMI.     Pt seen today with attending. We were called for evaluation of multiple sacral wounds.      (30 May 2023 23:39)    PAST MEDICAL & SURGICAL HISTORY:  HTN (hypertension)  Diabetes mellitus  H/O intracranial hemorrhage  H/O tracheostomy  PEG (percutaneous endoscopic gastrostomy) status  Hyperlipidemia  	    Allergies  penicillin (Other)  Intolerances    PHYSICAL EXAM    ICU Vital Signs Last 24 Hrs  T(C): 37.3 (15 Deepak 2023 13:02), Max: 37.3 (15 Deepak 2023 09:47)  T(F): 99.1 (15 Deepak 2023 13:02), Max: 99.2 (15 Deepak 2023 09:47)  HR: 104 (15 Deepak 2023 13:02) (79 - 104)  BP: 130/85 (15 Deepak 2023 13:02) (98/45 - 130/85)  RR: 20 (15 Deepak 2023 13:02) (18 - 20)  SpO2: 98% (15 Deepak 2023 13:02) (98% - 100%)    O2 Parameters below as of 15 Deepak 2023 13:02  Patient On (Oxygen Delivery Method): ventilator    PHYSICAL EXAM:  GENERAL: NAD, well-developed  HEAD:  Atraumatic, Normocephalic  EYES: EOMI  CHEST/LUNG: No cardiopulmonary compromise  Neuro: Alert,   SKIN: dressings in place, clean and dry. (recently changed by nursing)   Nurse was able to provide multiple pictures of wounds. no active bleeding, no purulent drainage  sacrum: ~12x6x0.5cm full thickness wound with mostly pink  tissues, some yellow adherent exudate in the 6pm position  left buttocks: ~9mos8kk wound with adherent yellow/brown eschar centrally surrounded by pink moist dermis  right buttocks: ~5x3cm wound with small adherent yellow/brown eschar centrally surrounded by pink moist dermis  back: ~5x4cm wound with small area of adherent yellow eschar and surround pink dermis  lateral ankle~2x2cm wound with pink moist dermis exposed

## 2023-06-15 NOTE — PROGRESS NOTE ADULT - ASSESSMENT
IMPRESSION:    Sepsis present on admission  Candida UTI  pseudomonas pna  l side atelectasis/ effusion  IMCHELLE on CKD III on dialysis   NSTEMI likely type 2  Hyponatremia resolved   Paroxysmal Afib  Acute on chronic anemia suspected UGI Bleed  Chronic respiratory failure  H/o ICH s/p trach and PEG  H/o CAD      PLAN:    CNS: Avoid CNS depressants     HEENT: Oral and trach care    PULMONARY: HOB 45. Aspiration.  No vent changes.  Goal saturation 92-96%. Vent dependents.  Pulmonary toilet ,  repeat CXR    CARDIOVASCULAR: Avoid overload.  Continue rate control     GI: PPI q 12    RENAL: trend CMP.  Correct as needed.  Nephrology following     INFECTIOUS DISEASE: per ID    HEMATOLOGICAL: DVT prophylaxis.  LE duplex negative.  Monitor CBC      ENDOCRINE: Follow up FS. Insulin protocol if needed.      DNR    Poor prognosis

## 2023-06-15 NOTE — PROGRESS NOTE ADULT - SUBJECTIVE AND OBJECTIVE BOX
seen and examined  24 h events noted   trach/vent         PAST HISTORY  --------------------------------------------------------------------------------  No significant changes to PMH, PSH, FHx, SHx, unless otherwise noted    ALLERGIES & MEDICATIONS  --------------------------------------------------------------------------------  Allergies    penicillin (Other)    Intolerances      Standing Inpatient Medications  acetylcysteine 20%  Inhalation 4 milliLiter(s) Inhalation every 6 hours  albuterol/ipratropium for Nebulization 3 milliLiter(s) Nebulizer every 6 hours  aMIOdarone    Tablet   Oral   aMIOdarone    Tablet 200 milliGRAM(s) Oral daily  aspirin  chewable 81 milliGRAM(s) Oral daily  atorvastatin 80 milliGRAM(s) Oral at bedtime  buMETAnide Injectable 2 milliGRAM(s) IV Push every 12 hours  ceftolozane/tazobactam IVPB 450 milliGRAM(s) IV Intermittent every 8 hours  chlorhexidine 0.12% Liquid 15 milliLiter(s) Oral Mucosa two times a day  chlorhexidine 2% Cloths 1 Application(s) Topical <User Schedule>  dextrose 5%. 1000 milliLiter(s) IV Continuous <Continuous>  dextrose 5%. 1000 milliLiter(s) IV Continuous <Continuous>  dextrose 50% Injectable 25 Gram(s) IV Push once  dextrose 50% Injectable 25 Gram(s) IV Push once  dextrose 50% Injectable 12.5 Gram(s) IV Push once  diltiazem    milliGRAM(s) Oral daily  fluconAZOLE   40 mG/mL Suspension 200 milliGRAM(s) Enteral Tube every 24 hours  glucagon  Injectable 1 milliGRAM(s) IntraMuscular once  heparin   Injectable 5000 Unit(s) SubCutaneous every 12 hours  insulin glargine Injectable (LANTUS) 10 Unit(s) SubCutaneous at bedtime  insulin lispro (ADMELOG) corrective regimen sliding scale   SubCutaneous three times a day before meals  levETIRAcetam  Solution 500 milliGRAM(s) Oral two times a day  pantoprazole  Injectable 40 milliGRAM(s) IV Push every 12 hours  polyethylene glycol 3350 17 Gram(s) Oral daily  senna 2 Tablet(s) Oral at bedtime  silver sulfADIAZINE 1% Cream 1 Application(s) Topical every 12 hours    PRN Inpatient Medications  acetaminophen     Tablet .. 650 milliGRAM(s) Oral every 6 hours PRN  dextrose Oral Gel 15 Gram(s) Oral once PRN  midodrine. 2.5 milliGRAM(s) Oral three times a day PRN  sodium chloride 0.9% Bolus. 100 milliLiter(s) IV Bolus every 5 minutes PRN      VITALS/PHYSICAL EXAM  --------------------------------------------------------------------------------  T(C): 36.7 (06-15-23 @ 05:00), Max: 36.8 (06-14-23 @ 20:35)  HR: 83 (06-15-23 @ 05:00) (69 - 92)  BP: 109/57 (06-15-23 @ 05:00) (98/45 - 137/60)  RR: 18 (06-15-23 @ 05:00) (18 - 20)  SpO2: 100% (06-15-23 @ 05:00) (99% - 100%)  Wt(kg): --        06-14-23 @ 07:01  -  06-15-23 @ 07:00  --------------------------------------------------------  IN: 405 mL / OUT: 1100 mL / NET: -695 mL      Physical Exam:  	Gen: trach/vent  	Pulm: B/L aline   	CV: S1S2; no rub  	Abd: +distended  	LE:  edema  	Vascular access:    LABS/STUDIES  --------------------------------------------------------------------------------              7.2    9.69  >-----------<  272      [06-13-23 @ 19:14]              23.3     141  |  105  |  39  ----------------------------<  158      [06-14-23 @ 16:56]  4.3   |  28  |  1.5        Ca     8.1     [06-14-23 @ 16:56]    TPro  5.2  /  Alb  1.9  /  TBili  0.4  /  DBili  x   /  AST  58  /  ALT  15  /  AlkPhos  208  [06-13-23 @ 19:14]      Creatinine Trend:  SCr 1.5 [06-14 @ 16:56]  SCr 2.4 [06-13 @ 19:14]  SCr 2.4 [06-13 @ 17:05]  SCr 2.0 [06-12 @ 18:14]  SCr 2.3 [06-10 @ 18:42]    Urinalysis - [06-11-23 @ 18:25]      Color Yellow / Appearance Slightly Turbid / SG 1.016 / pH 8.0      Gluc Negative / Ketone Trace  / Bili Small / Urobili 6 mg/dL       Blood Moderate / Protein 100 mg/dL / Leuk Est Large / Nitrite Negative      RBC 72 /  / Hyaline 12 / Gran  / Sq Epi  / Non Sq Epi  / Bacteria Few      Iron 51, TIBC 111, %sat 46      [06-06-23 @ 10:40]  Ferritin 1956      [06-06-23 @ 10:40]  Vitamin D (25OH) 70      [06-03-23 @ 06:50]    HBsAb <3.0      [06-01-23 @ 22:50]  HBsAb Nonreact      [06-01-23 @ 22:50]  HBsAg Nonreact      [06-01-23 @ 22:50]  HBcAb Nonreact      [06-01-23 @ 22:50]  HIV Nonreact      [06-08-23 @ 16:00]  HIV Nonreact      [06-08-23 @ 12:20]

## 2023-06-16 NOTE — PROGRESS NOTE ADULT - SUBJECTIVE AND OBJECTIVE BOX
Over Night Events: events noted, vent dependant, afebrile    PHYSICAL EXAM    ICU Vital Signs Last 24 Hrs  T(C): 37 (16 Jun 2023 05:00), Max: 37.3 (15 Deepak 2023 09:47)  T(F): 98.6 (16 Jun 2023 05:00), Max: 99.2 (15 Deepak 2023 09:47)  HR: 90 (16 Jun 2023 05:00) (85 - 104)  BP: 132/61 (16 Jun 2023 05:00) (110/59 - 132/61)  RR: 20 (16 Jun 2023 05:00) (19 - 20)  SpO2: 100% (16 Jun 2023 05:00) (98% - 100%)    O2 Parameters below as of 15 Deepak 2023 13:02  Patient On (Oxygen Delivery Method): ventilator            General: ill looking  HEENT: trach  Lungs: dec bs both bases  Cardiovascular: Regular   Abdomen: Soft, Positive BS  ansarca  sacral ulcer  unresponsive      06-14-23 @ 07:01  -  06-15-23 @ 07:00  --------------------------------------------------------  IN:    Enteral Tube Flush: 30 mL    Peptamen A.F.: 375 mL  Total IN: 405 mL    OUT:    Indwelling Catheter - Urethral (mL): 100 mL    Other (mL): 1000 mL  Total OUT: 1100 mL    Total NET: -695 mL      06-15-23 @ 07:01  -  06-16-23 @ 06:33  --------------------------------------------------------  IN:    Enteral Tube Flush: 120 mL    Peptamen A.F.: 750 mL  Total IN: 870 mL    OUT:  Total OUT: 0 mL    Total NET: 870 mL          LABS:                          6.8    6.33  )-----------( 308      ( 15 Deepak 2023 08:41 )             22.4                                               06-15    140  |  103  |  48<H>  ----------------------------<  194<H>  4.8   |  25  |  2.0<H>    Ca    8.4      15 Deepak 2023 16:31  Mg     2.1     06-15    TPro  5.1<L>  /  Alb  1.9<L>  /  TBili  0.3  /  DBili  x   /  AST  40  /  ALT  13  /  AlkPhos  271<H>  06-15                                                                                           LIVER FUNCTIONS - ( 15 Deepak 2023 08:41 )  Alb: 1.9 g/dL / Pro: 5.1 g/dL / ALK PHOS: 271 U/L / ALT: 13 U/L / AST: 40 U/L / GGT: x                                                  Culture - Blood (collected 13 Jun 2023 19:14)  Source: .Blood Blood  Preliminary Report (15 Deepak 2023 02:02):    No growth to date.                                                   Mode: AC/ CMV (Assist Control/ Continuous Mandatory Ventilation)  RR (machine): 14  TV (machine): 450  FiO2: 40  PEEP: 8  ITime: 0.8  MAP: 13  PIP: 26                                          MEDICATIONS  (STANDING):  acetylcysteine 20%  Inhalation 4 milliLiter(s) Inhalation every 6 hours  albuterol/ipratropium for Nebulization 3 milliLiter(s) Nebulizer every 6 hours  aMIOdarone    Tablet   Oral   aMIOdarone    Tablet 200 milliGRAM(s) Oral daily  aspirin  chewable 81 milliGRAM(s) Oral daily  atorvastatin 80 milliGRAM(s) Oral at bedtime  buMETAnide Injectable 2 milliGRAM(s) IV Push every 12 hours  ceftolozane/tazobactam IVPB 450 milliGRAM(s) IV Intermittent every 8 hours  chlorhexidine 0.12% Liquid 15 milliLiter(s) Oral Mucosa two times a day  chlorhexidine 2% Cloths 1 Application(s) Topical <User Schedule>  collagenase Ointment 1 Application(s) Topical two times a day  dextrose 5%. 1000 milliLiter(s) (50 mL/Hr) IV Continuous <Continuous>  dextrose 5%. 1000 milliLiter(s) (100 mL/Hr) IV Continuous <Continuous>  dextrose 50% Injectable 25 Gram(s) IV Push once  dextrose 50% Injectable 12.5 Gram(s) IV Push once  dextrose 50% Injectable 25 Gram(s) IV Push once  diltiazem    milliGRAM(s) Oral daily  glucagon  Injectable 1 milliGRAM(s) IntraMuscular once  heparin   Injectable 5000 Unit(s) SubCutaneous every 12 hours  insulin glargine Injectable (LANTUS) 10 Unit(s) SubCutaneous at bedtime  insulin lispro (ADMELOG) corrective regimen sliding scale   SubCutaneous three times a day before meals  levETIRAcetam  Solution 500 milliGRAM(s) Oral two times a day  pantoprazole  Injectable 40 milliGRAM(s) IV Push every 12 hours  polyethylene glycol 3350 17 Gram(s) Oral daily  senna 2 Tablet(s) Oral at bedtime  silver sulfADIAZINE 1% Cream 1 Application(s) Topical every 12 hours    MEDICATIONS  (PRN):  acetaminophen     Tablet .. 650 milliGRAM(s) Oral every 6 hours PRN Temp greater or equal to 38C (100.4F), Mild Pain (1 - 3)  dextrose Oral Gel 15 Gram(s) Oral once PRN Blood Glucose LESS THAN 70 milliGRAM(s)/deciliter  midodrine. 2.5 milliGRAM(s) Oral three times a day PRN SBP < 110  sodium chloride 0.9% Bolus. 100 milliLiter(s) IV Bolus every 5 minutes PRN SBP LESS THAN or EQUAL to 80 mmHg

## 2023-06-16 NOTE — PROGRESS NOTE ADULT - ASSESSMENT
62 yo M with  PMH of ICH (2021) s/p trach and PEG, HTN, HLD, T2DM, CAD s/p stents, Recurrent C diff, BIBEMS from [facility] for abnormal labs. Patient non-verbal at baseline - limited history. Per NH chart  have a BUN greater than 200 and creatinine of 4.3 on outpatient labs. Outpatient CXR also w/ new L sided pleural effusion. With EMS patient was also found to be in irregular rhythm, no known history of A-fib or a flutter.  MICHELLE on CKD 3a - severe azotemia  likely due to GIB / hypotension  Started HD on 6/1/23  Acute anemia d/t UGIB  Troponemia  Lactic acidosis resolved   DKA resolved   Afib   - HD today / will hold HD over the weekend follow CReat / may overstimate true kidney function/ patient cachectic   - needs daily cr   - continue bumex , document accurate UO   - BP noted /  midodrine  5 q8   - repeat h/h / last sat elevated   - last  phos noted / no binders   - hold on TDC placement for now  - very poor prognosis   will follow

## 2023-06-16 NOTE — PROGRESS NOTE ADULT - SUBJECTIVE AND OBJECTIVE BOX
Patient is a 63y old  Male who presents with a chief complaint of Abnormal labs (16 Jun 2023 08:52)      INTERVAL HPI/OVERNIGHT EVENTS:      REVIEW OF SYSTEMS:  CONSTITUTIONAL: No fever, weight loss, or fatigue  EYES: No eye pain, visual disturbances, or discharge  ENMT:  No difficulty hearing, tinnitus, vertigo; No sinus or throat pain  NECK: No pain or stiffness  BREASTS: No pain, masses, or nipple discharge  RESPIRATORY: No cough, wheezing, chills or hemoptysis; No shortness of breath  CARDIOVASCULAR: No chest pain, palpitations, dizziness, or leg swelling  GASTROINTESTINAL: No abdominal or epigastric pain. No nausea, vomiting, or hematemesis; No diarrhea or constipation. No melena or hematochezia.  GENITOURINARY: No dysuria, frequency, hematuria, or incontinence  NEUROLOGICAL: No headaches, memory loss, loss of strength, numbness, or tremors  SKIN: No itching, burning, rashes, or lesions   LYMPH NODES: No enlarged glands  ENDOCRINE: No heat or cold intolerance; No hair loss  MUSCULOSKELETAL: No joint pain or swelling; No muscle, back, or extremity pain  PSYCHIATRIC: No depression, anxiety, mood swings, or difficulty sleeping  HEME/LYMPH: No easy bruising, or bleeding gums  ALLERY AND IMMUNOLOGIC: No hives or eczema  FAMILY HISTORY:    T(C): 37 (06-16-23 @ 05:00), Max: 37.3 (06-15-23 @ 13:02)  HR: 106 (06-16-23 @ 08:30) (85 - 106)  BP: 124/77 (06-16-23 @ 08:30) (113/59 - 132/61)  RR: 33 (06-16-23 @ 08:30) (19 - 33)  SpO2: 94% (06-16-23 @ 08:30) (94% - 100%)  Wt(kg): --Vital Signs Last 24 Hrs  T(C): 37 (16 Jun 2023 05:00), Max: 37.3 (15 Deepak 2023 13:02)  T(F): 98.6 (16 Jun 2023 05:00), Max: 99.1 (15 Deepak 2023 13:02)  HR: 106 (16 Jun 2023 08:30) (85 - 106)  BP: 124/77 (16 Jun 2023 08:30) (113/59 - 132/61)  BP(mean): --  RR: 33 (16 Jun 2023 08:30) (19 - 33)  SpO2: 94% (16 Jun 2023 08:30) (94% - 100%)    Parameters below as of 16 Jun 2023 08:30  Patient On (Oxygen Delivery Method): ventilator    O2 Concentration (%): 60    PHYSICAL EXAM:  GENERAL: Trach peg  HEAD:  Atraumatic, Normocephalic  EYES: EOMI, PERRLA, conjunctiva and sclera clear  ENMT: No tonsillar erythema, exudates, or enlargement; Moist mucous membranes, Good dentition, No lesions  NECK: Trach  NERVOUS SYSTEM:  Alert & Oriented X0  CHEST/LUNG: Clear to percussion bilaterally; No rales, rhonchi, wheezing, or rubs  HEART: Regular rate and rhythm; No murmurs, rubs, or gallops  ABDOMEN: PEG  EXTREMITIES:  2+ Peripheral Pulses, No clubbing, cyanosis, or edema  LYMPH: No lymphadenopathy noted  SKIN: No rashes or lesions      acetaminophen     Tablet .. 650 milliGRAM(s) Oral every 6 hours PRN  acetylcysteine 20%  Inhalation 4 milliLiter(s) Inhalation every 6 hours  albuterol/ipratropium for Nebulization 3 milliLiter(s) Nebulizer every 6 hours  aMIOdarone    Tablet   Oral   aMIOdarone    Tablet 200 milliGRAM(s) Oral daily  aspirin  chewable 81 milliGRAM(s) Oral daily  atorvastatin 80 milliGRAM(s) Oral at bedtime  buMETAnide Injectable 2 milliGRAM(s) IV Push every 12 hours  ceftolozane/tazobactam IVPB 450 milliGRAM(s) IV Intermittent every 8 hours  chlorhexidine 0.12% Liquid 15 milliLiter(s) Oral Mucosa two times a day  chlorhexidine 2% Cloths 1 Application(s) Topical <User Schedule>  collagenase Ointment 1 Application(s) Topical two times a day  dextrose 5%. 1000 milliLiter(s) IV Continuous <Continuous>  dextrose 5%. 1000 milliLiter(s) IV Continuous <Continuous>  dextrose 50% Injectable 25 Gram(s) IV Push once  dextrose 50% Injectable 12.5 Gram(s) IV Push once  dextrose 50% Injectable 25 Gram(s) IV Push once  dextrose Oral Gel 15 Gram(s) Oral once PRN  diltiazem    milliGRAM(s) Oral daily  glucagon  Injectable 1 milliGRAM(s) IntraMuscular once  heparin   Injectable 5000 Unit(s) SubCutaneous every 12 hours  insulin glargine Injectable (LANTUS) 10 Unit(s) SubCutaneous at bedtime  insulin lispro (ADMELOG) corrective regimen sliding scale   SubCutaneous three times a day before meals  levETIRAcetam  Solution 500 milliGRAM(s) Oral two times a day  midodrine. 2.5 milliGRAM(s) Oral three times a day PRN  pantoprazole  Injectable 40 milliGRAM(s) IV Push every 12 hours  polyethylene glycol 3350 17 Gram(s) Oral daily  senna 2 Tablet(s) Oral at bedtime  silver sulfADIAZINE 1% Cream 1 Application(s) Topical every 12 hours  sodium chloride 0.9% Bolus. 100 milliLiter(s) IV Bolus every 5 minutes PRN    Mode: AC/ CMV (Assist Control/ Continuous Mandatory Ventilation), RR (machine): 14, TV (machine): 450, FiO2: 40, PEEP: 8, ITime: 0.8, MAP: 13, PIP: 26  HEALTH ISSUES - PROBLEM Dx:  Sepsis    Acute UTI    History of intracranial hemorrhage    Palliative care by specialist    Anemia    MICHELLE (acute kidney injury)    Pain

## 2023-06-16 NOTE — PROGRESS NOTE ADULT - SUBJECTIVE AND OBJECTIVE BOX
NUTRITION SUPPORT TEAM  -  PROGRESS NOTE     Interval Events:  remains vented, + trach, awake but unresponsive   + anasarca somewhat improved  abd soft, + GT  + sacral ulcer  pt cont to have loose brown BMs (despite receiving hydrolyzed enteral Rx)    VITALS:  T(F): 98.6 (06-16 @ 05:00), Max: 98.6 (06-16 @ 05:00)  HR: 104 (06-16 @ 11:30) (90 - 106)  BP: 108/56 (06-16 @ 11:30) (108/56 - 132/61)  RR: 30 (06-16 @ 11:30) (20 - 33)  SpO2: 95% (06-16 @ 11:30) (94% - 100%)    HEIGHT/WEIGHT/BMI:   Height (cm): 170.2 (06-01), 180.3 (10-04)  Weight (kg): 93.3 (06-05), 66.4 (10-04)--------------- most recent 75 kg  BMI (kg/m2): 32.2 (06-05), 22.9 (06-01), 20.4 (10-04)    I/Os:     06-15-23 @ 07:01  -  06-16-23 @ 07:00  --------------------------------------------------------  IN:    Enteral Tube Flush: 120 mL    Peptamen A.F.: 750 mL--- incomplete documentation? prescribed feeds are 1500 ml/d  Total IN: 870 mL    OUT:  Total OUT: 0 mL    Total NET: 870 mL    STANDING MEDICATIONS:   acetylcysteine 20%  Inhalation 4 milliLiter(s) Inhalation every 6 hours  albuterol/ipratropium for Nebulization 3 milliLiter(s) Nebulizer every 6 hours  aMIOdarone    Tablet   Oral   aMIOdarone    Tablet 200 milliGRAM(s) Oral daily  aspirin  chewable 81 milliGRAM(s) Oral daily  atorvastatin 80 milliGRAM(s) Oral at bedtime  buMETAnide Injectable 2 milliGRAM(s) IV Push every 12 hours  ceftolozane/tazobactam IVPB 450 milliGRAM(s) IV Intermittent every 8 hours  chlorhexidine 0.12% Liquid 15 milliLiter(s) Oral Mucosa two times a day  chlorhexidine 2% Cloths 1 Application(s) Topical <User Schedule>  collagenase Ointment 1 Application(s) Topical two times a day  diltiazem    milliGRAM(s) Oral daily  heparin   Injectable 5000 Unit(s) SubCutaneous every 12 hours  insulin glargine Injectable (LANTUS) 10 Unit(s) SubCutaneous at bedtime  insulin lispro (ADMELOG) corrective regimen sliding scale   SubCutaneous three times a day before meals  levETIRAcetam  Solution 500 milliGRAM(s) Oral two times a day  pantoprazole  Injectable 40 milliGRAM(s) IV Push every 12 hours  polyethylene glycol 3350 17 Gram(s) Oral daily  senna 2 Tablet(s) Oral at bedtime  silver sulfADIAZINE 1% Cream 1 Application(s) Topical every 12 hours    LABS:                         7.9    7.65  )-----------( 365      ( 16 Jun 2023 07:12 )             25.6     139  |  103  |  53<H>  ----------------------------<  169<H>          (06-16-23 @ 07:12)  4.5   |  26  |  2.1<H>    Ca    8.4          (06-16-23 @ 07:12)  Mg     2.2         (06-16-23 @ 07:12)    TPro  5.3<L>  /  Alb  1.9<L>  /  TBili  0.3  /  DBili  x   /  AST  28  /  ALT  11  /  AlkPhos  273<H>       06-16-23 @ 07:12    Vitamin D, 25-Hydroxy: 70 ng/mL (06-03 @ 06:50)    Folate: >20.0 ng/mL (06-03 @ 06:50)    Vitamin B12, Serum: 1908 pg/mL (06-06 @ 10:40)    Zinc Level, Plasma: 54 ug/dL (06-03 @ 06:50)    Blood Glucose (Past 24 hours):  123 mg/dL (06-16 @ 11:57)  197 mg/dL (06-16 @ 08:27)  187 mg/dL (06-16 @ 05:04)  194 mg/dL (06-15 @ 23:21)  219 mg/dL (06-15 @ 21:38)  195 mg/dL (06-15 @ 17:07)    DIET:   Diet, NPO with Tube Feed:   Tube Feeding Modality: Gastrostomy  Peptamen A.F. Formula  Total Volume for 24 Hours (mL): 1500  Bolus  Total Volume of Bolus (mL):  375  Tube Feed Frequency: Every 6 hours   Tube Feed Start Time: 16:00  Bolus Feed Rate (mL per Hour): 500   Bolus Feed Duration (in Hours): 0.75  Free Water Flush Instructions:  flush GT with 30 ml water syringe push before & after each feed (06-02-23 @ 15:58) [Active]     patient

## 2023-06-16 NOTE — PROGRESS NOTE ADULT - ASSESSMENT
Acute Renal Failure, started on HD 6/1  Fluid Overload  HAGMA  - non oliguric, RBUS without hydro   - started on HD 6/1 via [facility] [place]   - Cr much improved s/p HD yesterday, now 1.9  f/u Nephro recs  - c/w bumex 2mg iv q12hr, HD per renal--> HD today   - c/w midodrine 10mg q8hr  - c/w sodium bicarb 1300mg PO TID  -  TTE 6/1 - EF 63%, GIDD   - nephrology following   - HD today -> if cr not improving will need TDC as per nephro.   - IR consult for TDC    #Possible pseudomonal pneumonia   - History of resistant pseudomonal infection  - Chest Xray worsening   - fu repeat chest xray  - fu ID consult  - spike fever last night - suspect VAP  - Switch gentamicin to ceftolozane/tazobactam    Candida tropicalis UTI  s/p cefepime and vanc   Blood clts neg, URine with Candida tropicalis   on  fluconazole, ID following     Acute anemia  - HgB 7.9 this AM (baseline 8)   - sp 1 unit yesterday  - evaluated by GI - no evidence of GI bleed (PEG tube flushed with return of bile and no blood)  - Monitor H/H closely  - Active T+S  - sp multiple units on this hospitilization    Hyponatremia, likely hypervolemic  - c/w bumex 2mg iv q12hr and HD per nephro  - daily BMP    Afib w/ RVR - now rate controlled   - currently rate controlled s/p amio gtt  - change amio to 200 q24H   - started [id number] mg daily as he went back into afib rvr yest.  - now rate controlled    CAD s/p stents  NSTEMI  - Trops 1.80-->1.63-->1.57-->1.55  - seen by cardio,  c/w asa and statin, medical management     DKA, resolved  - s/p insulin drip, now on basal/bolus and tube feeds. Monitor FS     Hx ICH s/p trach and PEG  Bedbound from NH   - c/w keppra 500mg BID for seizure prophylaxis    DNR only, overall prognosis is poor  - Wife would like to speak to palliative before making decision on CMO    #Unstageable sacral ulcer  - Burn consult    Spoke to daughter 6/16: She would like to hold off on TDC until she discusses with her kids. She understands that prognosis is poor.

## 2023-06-16 NOTE — PROGRESS NOTE ADULT - SUBJECTIVE AND OBJECTIVE BOX
Nephrology progress note    THIS IS AN INCOMPLETE NOTE . FULL NOTE TO FOLLOW SHORTLY    Patient is seen and examined, events over the last 24 h noted .    Allergies:  penicillin (Other)    Hospital Medications:   MEDICATIONS  (STANDING):  acetylcysteine 20%  Inhalation 4 milliLiter(s) Inhalation every 6 hours  albuterol/ipratropium for Nebulization 3 milliLiter(s) Nebulizer every 6 hours  aMIOdarone    Tablet   Oral   aMIOdarone    Tablet 200 milliGRAM(s) Oral daily  aspirin  chewable 81 milliGRAM(s) Oral daily  atorvastatin 80 milliGRAM(s) Oral at bedtime  buMETAnide Injectable 2 milliGRAM(s) IV Push every 12 hours  ceftolozane/tazobactam IVPB 450 milliGRAM(s) IV Intermittent every 8 hours  chlorhexidine 0.12% Liquid 15 milliLiter(s) Oral Mucosa two times a day  chlorhexidine 2% Cloths 1 Application(s) Topical <User Schedule>  collagenase Ointment 1 Application(s) Topical two times a day  dextrose 5%. 1000 milliLiter(s) (100 mL/Hr) IV Continuous <Continuous>  dextrose 5%. 1000 milliLiter(s) (50 mL/Hr) IV Continuous <Continuous>  dextrose 50% Injectable 25 Gram(s) IV Push once  dextrose 50% Injectable 12.5 Gram(s) IV Push once  dextrose 50% Injectable 25 Gram(s) IV Push once  diltiazem    milliGRAM(s) Oral daily  glucagon  Injectable 1 milliGRAM(s) IntraMuscular once  heparin   Injectable 5000 Unit(s) SubCutaneous every 12 hours  insulin glargine Injectable (LANTUS) 10 Unit(s) SubCutaneous at bedtime  insulin lispro (ADMELOG) corrective regimen sliding scale   SubCutaneous three times a day before meals  levETIRAcetam  Solution 500 milliGRAM(s) Oral two times a day  pantoprazole  Injectable 40 milliGRAM(s) IV Push every 12 hours  polyethylene glycol 3350 17 Gram(s) Oral daily  senna 2 Tablet(s) Oral at bedtime  silver sulfADIAZINE 1% Cream 1 Application(s) Topical every 12 hours        VITALS:  T(F): 98.6 (23 @ 05:00), Max: 99.2 (06-15-23 @ 09:47)  HR: 90 (23 @ 05:00)  BP: 132/61 (23 @ 05:00)  RR: 20 (23 @ 05:00)  SpO2: 100% (23 @ 05:00)  Wt(kg): --     @ 07:01  -  06-15 @ 07:00  --------------------------------------------------------  IN: 405 mL / OUT: 1100 mL / NET: -695 mL    06-15 @ 07:01  -   @ 07:00  --------------------------------------------------------  IN: 870 mL / OUT: 0 mL / NET: 870 mL          PHYSICAL EXAM:  Constitutional: NAD  HEENT: anicteric sclera, oropharynx clear, MMM  Neck: No JVD  Respiratory: CTAB, no wheezes, rales or rhonchi  Cardiovascular: S1, S2, RRR  Gastrointestinal: BS+, soft, NT/ND  Extremities: No cyanosis or clubbing. No peripheral edema  :  No teixeira.   Skin: No rashes    LABS:  06-15    140  |  103  |  48<H>  ----------------------------<  194<H>  4.8   |  25  |  2.0<H>    Ca    8.4      15 Deepak 2023 16:31  Mg     2.1     06-15    TPro  5.1<L>  /  Alb  1.9<L>  /  TBili  0.3  /  DBili      /  AST  40  /  ALT  13  /  AlkPhos  271<H>  06-15                          7.9    7.65  )-----------( 365      ( 2023 07:12 )             25.6       Urine Studies:  Urinalysis Basic - ( 2023 18:25 )    Color: Yellow / Appearance: Slightly Turbid / S.016 / pH:   Gluc:  / Ketone: Trace  / Bili: Small / Urobili: 6 mg/dL   Blood:  / Protein: 100 mg/dL / Nitrite: Negative   Leuk Esterase: Large / RBC: 72 /HPF /  /HPF   Sq Epi:  / Non Sq Epi:  / Bacteria: Few          Iron 51, TIBC 111, %sat 46      [23 @ 10:40]  Ferritin 1956      [23 @ 10:40]  Vitamin D (25OH) 70      [23 @ 06:50]    HBsAb <3.0      [23 @ 22:50]  HBsAb Nonreact      [23 @ 22:50]  HBsAg Nonreact      [23 @ 22:50]  HBcAb Nonreact      [23 @ 22:50]  HCV 0.09, Nonreact      [21 @ 09:04]  HIV Nonreact      [23 @ 16:00]  HIV Nonreact      [23 @ 12:20]        RADIOLOGY & ADDITIONAL STUDIES:   Nephrology progress note    Patient is seen and examined, events over the last 24 h noted .  Lying in bed     Allergies:  penicillin (Other)    Hospital Medications:   MEDICATIONS  (STANDING):  acetylcysteine 20%  Inhalation 4 milliLiter(s) Inhalation every 6 hours  albuterol/ipratropium for Nebulization 3 milliLiter(s) Nebulizer every 6 hours  aMIOdarone    Tablet 200 milliGRAM(s) Oral daily  aspirin  chewable 81 milliGRAM(s) Oral daily  atorvastatin 80 milliGRAM(s) Oral at bedtime  buMETAnide Injectable 2 milliGRAM(s) IV Push every 12 hours  ceftolozane/tazobactam IVPB 450 milliGRAM(s) IV Intermittent every 8 hours  collagenase Ointment 1 Application(s) Topical two times a day  diltiazem    milliGRAM(s) Oral daily  glucagon  Injectable 1 milliGRAM(s) IntraMuscular once  heparin   Injectable 5000 Unit(s) SubCutaneous every 12 hours  insulin glargine Injectable (LANTUS) 10 Unit(s) SubCutaneous at bedtime  insulin lispro (ADMELOG) corrective regimen sliding scale   SubCutaneous three times a day before meals  levETIRAcetam  Solution 500 milliGRAM(s) Oral two times a day  pantoprazole  Injectable 40 milliGRAM(s) IV Push every 12 hours  polyethylene glycol 3350 17 Gram(s) Oral daily  senna 2 Tablet(s) Oral at bedtime  silver sulfADIAZINE 1% Cream 1 Application(s) Topical every 12 hours        VITALS:  T(F): 98.6 (23 @ 05:00), Max: 99.2 (06-15-23 @ 09:47)  HR: 90 (23 @ 05:00)  BP: 132/61 (23 @ 05:00)  RR: 20 (23 @ 05:00)  SpO2: 100% (23 @ 05:00)       @ 07:  -  06-15 @ 07:00  --------------------------------------------------------  IN: 405 mL / OUT: 1100 mL / NET: -695 mL    06-15 @ 07:01  -   @ 07:00  --------------------------------------------------------  IN: 870 mL / OUT: 0 mL / NET: 870 mL          PHYSICAL EXAM:  Constitutional: NAD  Respiratory: CTAB,   Cardiovascular: S1, S2, RRR  Gastrointestinal: BS+, soft, NT/ND  Extremities: No cyanosis or clubbing. No peripheral edema  Skin: No rashes    LABS:  06-15    140  |  103  |  48<H>  ----------------------------<  194<H>  4.8   |  25  |  2.0<H>    Ca    8.4      15 Deepak 2023 16:31  Mg     2.1     06-15    TPro  5.1<L>  /  Alb  1.9<L>  /  TBili  0.3  /  DBili      /  AST  40  /  ALT  13  /  AlkPhos  271<H>  06-15                          7.9    7.65  )-----------( 365      ( 2023 07:12 )             25.6       Urine Studies:  Urinalysis Basic - ( 2023 18:25 )    Color: Yellow / Appearance: Slightly Turbid / S.016 / pH:   Gluc:  / Ketone: Trace  / Bili: Small / Urobili: 6 mg/dL   Blood:  / Protein: 100 mg/dL / Nitrite: Negative   Leuk Esterase: Large / RBC: 72 /HPF /  /HPF   Sq Epi:  / Non Sq Epi:  / Bacteria: Few          Iron 51, TIBC 111, %sat 46      [23 @ 10:40]  Ferritin 1956      [23 @ 10:40]  Vitamin D (25OH) 70      [23 @ 06:50]    HBsAb <3.0      [23 @ 22:50]  HBsAb Nonreact      [23 @ 22:50]  HBsAg Nonreact      [23 @ 22:50]  HBcAb Nonreact      [23 @ 22:50]  HCV 0.09, Nonreact      [21 @ 09:04]  HIV Nonreact      [23 @ 16:00]  HIV Nonreact      [23 @ 12:20]        RADIOLOGY & ADDITIONAL STUDIES:

## 2023-06-16 NOTE — PROGRESS NOTE ADULT - ASSESSMENT
IMPRESSION:    Sepsis present on admission  Candida UTI  pseudomonas pna  l side atelectasis/ effusion improved  MICHELLE on CKD III on dialysis   NSTEMI likely type 2  Hyponatremia resolved   Paroxysmal Afib  Acute on chronic anemia suspected UGI Bleed  Chronic respiratory failure  H/o ICH s/p trach and PEG  H/o CAD      PLAN:    CNS: Avoid CNS depressants     HEENT: Oral and trach care    PULMONARY: HOB 45. Aspiration.  No vent changes.  Goal saturation 92-96%. Vent dependents.  Pulmonary toilet ,  repeat CXR noted, no bronc/no thora    CARDIOVASCULAR: Avoid overload.  Continue rate control keep - balance    GI: PPI q 12    RENAL: trend CMP.  Correct as needed.  Nephrology following     INFECTIOUS DISEASE: per ID    HEMATOLOGICAL: DVT prophylaxis.  LE duplex negative.  Monitor CBC  , transfuse 1 unit PRBC    ENDOCRINE: Follow up FS. Insulin protocol if needed.      DNR    Poor prognosis

## 2023-06-16 NOTE — PROGRESS NOTE ADULT - ATTENDING COMMENTS
64 yo M with  PMH of ICH (2021) s/p trach and PEG, HTN, HLD, T2DM, CAD s/p stents, Recurrent C diff, BIBEMS from [facility] for abnormal labs. Patient non-verbal at baseline - limited history. Per NH chart  have a BUN greater than 200 and creatinine of 4.3 on outpatient labs. Outpatient CXR also w/ new L sided pleural effusion. In ED, patient was hypotensive with Afib with RVR, found to be in acure renal failure, anemic with elevated trops. He was started on Amiodarone GTT, PPI GTT, gastric lavage with coffee ground secretions, started on broad spectrum abx and admitted to MICU  While in MICU, found to be in DKA, s/p insulin drip, MICHELLE started on HD, acute anemia s/p PRBC transfusion, NSTEMI with downtrending trops, now downgraded to SDU -> floor     # Acute Renal Failure, started on HD 6/1  nephro following, cont HD for now, holding off tesio per nephro   creatinine trend  2.1 (06-16-23 @ 07:12)  2.0 (06-15-23 @ 16:31)  1.9 (06-15-23 @ 08:41)  1.5 (06-14-23 @ 16:56)  2.4 (06-13-23 @ 19:14)  2.4 (06-13-23 @ 17:05)  2.0 (06-12-23 @ 18:14)    # Candida tropicalis UTI  s/p cefepime and vanc   Blood clts neg, URine with Candida tropicalis   on  fluconazole, changed from IV to PO via gtube     # Acute anemia , suspected gib, not a candidate for any endoscopy.   - HgB 6.7 (baseline 8) s/p 2U pRBC - improved to 9.9, now dropped again, transfuse 1 more unit and followup   - evaluated by GI - no evidence of GI bleed (PEG tube flushed with return of bile and no blood)  - Monitor H/H closely. Hb dropped to 6.6 yesterday >> trend CBC, Transfuse PRN, F/U GI  - Active T+S    # atelectasis/ effusion  ? ct chst ordered  per pulm rec     # electrolyte imbalance : sodium and potassium wnl,    # Afib w/ RVR - now rate controlled   - currently rate controlled s/p amio gtt  - currently on  amiodarone   200 q24H     # CAD s/p stents, NSTEMI  - Trops 1.80-->1.63-->1.57-->1.55  - seen by cardio,  c/w asa and statin, medical management     # DM with  DKA now resolved  - s/p insulin drip, now on basal/bolus and tube feeds. Monitor FS   CAPILLARY BLOOD GLUCOSE  POCT Blood Glucose.: 197 mg/dL (16 Jun 2023 08:27)  POCT Blood Glucose.: 187 mg/dL (16 Jun 2023 05:04)  POCT Blood Glucose.: 194 mg/dL (15 Deepak 2023 23:21)  POCT Blood Glucose.: 219 mg/dL (15 Deepak 2023 21:38)  POCT Blood Glucose.: 195 mg/dL (15 Deepak 2023 17:07)  POCT Blood Glucose.: 156 mg/dL (15 Deepak 2023 11:31)    # Hx ICH s/p trach and PEG  Bedbound from NH   - c/w keppra 500mg BID for seizure prophylaxis    # Vent dependant resp failure with pleural effusion      < from: CT Chest No Cont (06.15.23 @ 16:09) >  Interval development bilateral large pleural effusions with persistent   lower lobe consolidations.    Intralobular septal thickening in the well aerated portions of the upper   lobes are noted. No pneumothorax or parenchymal mass.    < end of copied text >    pulm followup, may need thoracentesis     # palliative care, dw palliative care team, they will cont following for symptom management     #Progress Note Handoff  Pending: pulm followup, nephro followup   Family discussion: team spoke with wife  Disposition: SNF

## 2023-06-16 NOTE — PROGRESS NOTE ADULT - ASSESSMENT
- MICHELLE on CKD3 now on HD  - fluid overload and anasarca  - multiple areas of clean but deep decubiti  - DM  - hyponatremia  - ? DKA on admission  - A fib  - h/o recurrent C diff - cont to have loose brown BMs - but pt is receiving both miralax and senna!!     SUGGEST:  - d/c the cathartic drugs   -continue with Peptamen AF for now     375 ml over 45 min x 4 feeds/d --> 1800 kcal (low % carb) 114 gm protein (whey source), 1200 mg phos, 62 mEq K/d  - check poc glucose prior to each feeding  - f/u phos with pre-HD labs  - limit pre and post feed flushes to 30 ml  - document BMs  - will later transition to Glucerna, and at NH likely rec the 1.5

## 2023-06-17 NOTE — PROGRESS NOTE ADULT - SUBJECTIVE AND OBJECTIVE BOX
seen and examined  24 h events noted         PAST HISTORY  --------------------------------------------------------------------------------  No significant changes to PMH, PSH, FHx, SHx, unless otherwise noted    ALLERGIES & MEDICATIONS  --------------------------------------------------------------------------------  Allergies    penicillin (Other)    Intolerances      Standing Inpatient Medications  acetylcysteine 20%  Inhalation 4 milliLiter(s) Inhalation every 6 hours  albuterol/ipratropium for Nebulization 3 milliLiter(s) Nebulizer every 6 hours  aMIOdarone    Tablet 200 milliGRAM(s) Oral daily  aMIOdarone    Tablet   Oral   aspirin  chewable 81 milliGRAM(s) Oral daily  atorvastatin 80 milliGRAM(s) Oral at bedtime  buMETAnide Injectable 2 milliGRAM(s) IV Push every 12 hours  ceftolozane/tazobactam IVPB 450 milliGRAM(s) IV Intermittent every 8 hours  chlorhexidine 0.12% Liquid 15 milliLiter(s) Oral Mucosa two times a day  chlorhexidine 2% Cloths 1 Application(s) Topical <User Schedule>  collagenase Ointment 1 Application(s) Topical two times a day  dextrose 5%. 1000 milliLiter(s) IV Continuous <Continuous>  dextrose 5%. 1000 milliLiter(s) IV Continuous <Continuous>  dextrose 50% Injectable 25 Gram(s) IV Push once  dextrose 50% Injectable 12.5 Gram(s) IV Push once  dextrose 50% Injectable 25 Gram(s) IV Push once  diltiazem    milliGRAM(s) Oral daily  glucagon  Injectable 1 milliGRAM(s) IntraMuscular once  heparin   Injectable 5000 Unit(s) SubCutaneous every 12 hours  insulin glargine Injectable (LANTUS) 10 Unit(s) SubCutaneous at bedtime  insulin lispro (ADMELOG) corrective regimen sliding scale   SubCutaneous three times a day before meals  levETIRAcetam  Solution 500 milliGRAM(s) Oral two times a day  pantoprazole  Injectable 40 milliGRAM(s) IV Push every 12 hours  silver sulfADIAZINE 1% Cream 1 Application(s) Topical every 12 hours    PRN Inpatient Medications  acetaminophen     Tablet .. 650 milliGRAM(s) Oral every 6 hours PRN  dextrose Oral Gel 15 Gram(s) Oral once PRN  midodrine. 2.5 milliGRAM(s) Oral three times a day PRN  sodium chloride 0.9% Bolus. 100 milliLiter(s) IV Bolus every 5 minutes PRN            VITALS/PHYSICAL EXAM  --------------------------------------------------------------------------------  T(C): 37.9 (06-17-23 @ 05:00), Max: 37.9 (06-17-23 @ 05:00)  HR: 89 (06-17-23 @ 05:00) (89 - 106)  BP: 124/61 (06-17-23 @ 05:00) (104/52 - 124/77)  RR: 25 (06-17-23 @ 05:00) (25 - 33)  SpO2: 100% (06-17-23 @ 05:00) (92% - 100%)  Wt(kg): --        06-16-23 @ 07:01  -  06-17-23 @ 07:00  --------------------------------------------------------  IN: 0 mL / OUT: 2000 mL / NET: -2000 mL      Physical Exam:  	Gen: vent   	Pulm:  B/L aline   	CV: S1S2; no rub  	Abd: +distended  	LE: edema  	Vascular access:    LABS/STUDIES  --------------------------------------------------------------------------------              7.9    7.65  >-----------<  365      [06-16-23 @ 07:12]              25.6     139  |  103  |  36  ----------------------------<  175      [06-16-23 @ 18:00]  5.3   |  24  |  1.7        Ca     8.0     [06-16-23 @ 18:00]      Mg     2.2     [06-16-23 @ 07:12]    TPro  5.3  /  Alb  1.9  /  TBili  0.3  /  DBili  x   /  AST  28  /  ALT  11  /  AlkPhos  273  [06-16-23 @ 07:12]      Creatinine Trend:  SCr 1.7 [06-16 @ 18:00]  SCr 2.1 [06-16 @ 07:12]  SCr 2.0 [06-15 @ 16:31]  SCr 1.9 [06-15 @ 08:41]  SCr 1.5 [06-14 @ 16:56]    Urinalysis - [06-11-23 @ 18:25]      Color Yellow / Appearance Slightly Turbid / SG 1.016 / pH 8.0      Gluc Negative / Ketone Trace  / Bili Small / Urobili 6 mg/dL       Blood Moderate / Protein 100 mg/dL / Leuk Est Large / Nitrite Negative      RBC 72 /  / Hyaline 12 / Gran  / Sq Epi  / Non Sq Epi  / Bacteria Few      Iron 51, TIBC 111, %sat 46      [06-06-23 @ 10:40]  Ferritin 1956      [06-06-23 @ 10:40]  Vitamin D (25OH) 70      [06-03-23 @ 06:50]    HBsAb <3.0      [06-01-23 @ 22:50]  HBsAb Nonreact      [06-01-23 @ 22:50]  HBsAg Nonreact      [06-01-23 @ 22:50]  HBcAb Nonreact      [06-01-23 @ 22:50]  HIV Nonreact      [06-08-23 @ 16:00]  HIV Nonreact      [06-08-23 @ 12:20]

## 2023-06-17 NOTE — PROGRESS NOTE ADULT - ASSESSMENT
64 yo M with  PMH of ICH (2021) s/p trach and PEG, HTN, HLD, T2DM, CAD s/p stents, Recurrent C diff, BIBEMS from [facility] for abnormal labs. Patient non-verbal at baseline - limited history. Per NH chart  have a BUN greater than 200 and creatinine of 4.3 on outpatient labs. Outpatient CXR also w/ new L sided pleural effusion. With EMS patient was also found to be in irregular rhythm, no known history of A-fib or a flutter.  MICHELLE on CKD 3a - severe azotemia  likely due to GIB / hypotension  Started HD on 6/1/23  Acute anemia d/t UGIB  Troponemia  Lactic acidosis resolved   DKA resolved   Afib   - s/p hd yesterday / hold hd and will assess daily needs   - needs daily cr   - continue bumex , document accurate UO   - feed : blake juarez noted   - repeat h/h / last sat elevated   - last  phos noted / no binders   - hold on TDC placement for now  - very poor prognosis   will follow

## 2023-06-17 NOTE — PROGRESS NOTE ADULT - ASSESSMENT
IMPRESSION:    Sepsis present on admission  Candida UTI  Pseudomonas pna  left side atelectasis improved  Bilateral pleural effusions likely volume overload   MICHELLE on CKD III on dialysis   NSTEMI likely type 2  Hyponatremia resolved   Paroxysmal Afib  Acute on chronic anemia suspected UGI Bleed  Chronic respiratory failure  H/o ICH s/p trach and PEG  H/o CAD      PLAN:    CNS: Avoid CNS depressants     HEENT: Oral and trach care    PULMONARY: HOB 45. Aspiration.  No vent changes.  Goal saturation 92-96%. Vent dependents.  Pulmonary toilet.  CXR noted     CARDIOVASCULAR: Avoid overload.  Continue rate control.  VOlume contraction as tolerated.      GI:Feedign.  Bowel regimen     RENAL: trend CMP.  Correct as needed.  Nephrology following     INFECTIOUS DISEASE: ABX per ID.  Monitor VS     HEMATOLOGICAL: DVT prophylaxis.  LE duplex negative.  Monitor CBC.        ENDOCRINE: Follow up FS. Insulin protocol if needed.    DNR    Poor prognosis

## 2023-06-17 NOTE — PROGRESS NOTE ADULT - SUBJECTIVE AND OBJECTIVE BOX
Patient is a 63y old  Male who presents with a chief complaint of Abnormal labs (17 Jun 2023 07:29)        Over Night Events:  on MV.  Off pressors.          ROS:     All ROS are negative except HPI         PHYSICAL EXAM    ICU Vital Signs Last 24 Hrs  T(C): 37.9 (17 Jun 2023 05:00), Max: 37.9 (17 Jun 2023 05:00)  T(F): 100.2 (17 Jun 2023 05:00), Max: 100.2 (17 Jun 2023 05:00)  HR: 89 (17 Jun 2023 05:00) (89 - 106)  BP: 124/61 (17 Jun 2023 05:00) (104/52 - 124/77)  BP(mean): --  ABP: --  ABP(mean): --  RR: 25 (17 Jun 2023 05:00) (25 - 33)  SpO2: 100% (17 Jun 2023 05:00) (92% - 100%)    O2 Parameters below as of 16 Jun 2023 11:30  Patient On (Oxygen Delivery Method): ventilator    O2 Concentration (%): 60        CONSTITUTIONAL:  In NAD    ENT:   Airway patent,   Mouth with normal mucosa.   Trach   EYES:   Pupils equal,   Round and reactive to light.    CARDIAC:   Normal rate,   Regular rhythm.      RESPIRATORY:   No wheezing  Bilateral BS  Normal chest expansion  Not tachypneic,  No use of accessory muscles    GASTROINTESTINAL:  Abdomen soft,   Non-tender,   No guarding,   + BS    MUSCULOSKELETAL:   Range of motion is limited,  No clubbing, cyanosis    NEUROLOGICAL:   Does not follow commands     SKIN:   Skin normal color for race,           06-16-23 @ 07:01  -  06-17-23 @ 07:00  --------------------------------------------------------  IN:  Total IN: 0 mL    OUT:    Other (mL): 2000 mL  Total OUT: 2000 mL    Total NET: -2000 mL          LABS:                            7.9    7.65  )-----------( 365      ( 16 Jun 2023 07:12 )             25.6                                               06-16    139  |  103  |  36<H>  ----------------------------<  175<H>  5.3<H>   |  24  |  1.7<H>    Ca    8.0<L>      16 Jun 2023 18:00  Mg     2.2     06-16    TPro  5.3<L>  /  Alb  1.9<L>  /  TBili  0.3  /  DBili  x   /  AST  28  /  ALT  11  /  AlkPhos  273<H>  06-16                                                                                           LIVER FUNCTIONS - ( 16 Jun 2023 07:12 )  Alb: 1.9 g/dL / Pro: 5.3 g/dL / ALK PHOS: 273 U/L / ALT: 11 U/L / AST: 28 U/L / GGT: x                                                                                               Mode: AC/ CMV (Assist Control/ Continuous Mandatory Ventilation)  RR (machine): 14  TV (machine): 450  FiO2: 70  PEEP: 5  ITime: 0.8  MAP: 14  PIP: 33                                          MEDICATIONS  (STANDING):  acetylcysteine 20%  Inhalation 4 milliLiter(s) Inhalation every 6 hours  albuterol/ipratropium for Nebulization 3 milliLiter(s) Nebulizer every 6 hours  aMIOdarone    Tablet 200 milliGRAM(s) Oral daily  aMIOdarone    Tablet   Oral   aspirin  chewable 81 milliGRAM(s) Oral daily  atorvastatin 80 milliGRAM(s) Oral at bedtime  buMETAnide Injectable 2 milliGRAM(s) IV Push every 12 hours  ceftolozane/tazobactam IVPB 450 milliGRAM(s) IV Intermittent every 8 hours  chlorhexidine 0.12% Liquid 15 milliLiter(s) Oral Mucosa two times a day  chlorhexidine 2% Cloths 1 Application(s) Topical <User Schedule>  collagenase Ointment 1 Application(s) Topical two times a day  dextrose 5%. 1000 milliLiter(s) (50 mL/Hr) IV Continuous <Continuous>  dextrose 5%. 1000 milliLiter(s) (100 mL/Hr) IV Continuous <Continuous>  dextrose 50% Injectable 25 Gram(s) IV Push once  dextrose 50% Injectable 12.5 Gram(s) IV Push once  dextrose 50% Injectable 25 Gram(s) IV Push once  diltiazem    milliGRAM(s) Oral daily  glucagon  Injectable 1 milliGRAM(s) IntraMuscular once  heparin   Injectable 5000 Unit(s) SubCutaneous every 12 hours  insulin glargine Injectable (LANTUS) 10 Unit(s) SubCutaneous at bedtime  insulin lispro (ADMELOG) corrective regimen sliding scale   SubCutaneous three times a day before meals  levETIRAcetam  Solution 500 milliGRAM(s) Oral two times a day  pantoprazole  Injectable 40 milliGRAM(s) IV Push every 12 hours  silver sulfADIAZINE 1% Cream 1 Application(s) Topical every 12 hours    MEDICATIONS  (PRN):  acetaminophen     Tablet .. 650 milliGRAM(s) Oral every 6 hours PRN Temp greater or equal to 38C (100.4F), Mild Pain (1 - 3)  dextrose Oral Gel 15 Gram(s) Oral once PRN Blood Glucose LESS THAN 70 milliGRAM(s)/deciliter  midodrine. 2.5 milliGRAM(s) Oral three times a day PRN SBP < 110  sodium chloride 0.9% Bolus. 100 milliLiter(s) IV Bolus every 5 minutes PRN SBP LESS THAN or EQUAL to 80 mmHg      New X-rays reviewed:                                                                                  ECHO

## 2023-06-17 NOTE — PROGRESS NOTE ADULT - SUBJECTIVE AND OBJECTIVE BOX
pt seen and examined.     My notes supersede resident's notes in case of discrepancy       ROS: no cp, no sob, no n/v, no fever    Vital Signs Last 24 Hrs  T(C): 37.9 (17 Jun 2023 05:00), Max: 37.9 (17 Jun 2023 05:00)  T(F): 100.2 (17 Jun 2023 05:00), Max: 100.2 (17 Jun 2023 05:00)  HR: 90 (17 Jun 2023 07:46) (89 - 106)  BP: 124/61 (17 Jun 2023 05:00) (104/52 - 124/61)  BP(mean): --  RR: 20 (17 Jun 2023 07:46) (20 - 25)  SpO2: 100% (17 Jun 2023 07:46) (92% - 100%)    Parameters below as of 17 Jun 2023 07:46  Patient On (Oxygen Delivery Method): ventilator    O2 Concentration (%): 70    physical exam  constitutional NAD, AAOX3, Respiratory  lungs CTA, CVS heart RRR, GI: abdomen Soft NT, ND, BS+, skin: intact  neuro exam Motor, sensory and CN normal, no deficit     MEDICATIONS  (STANDING):  acetylcysteine 20%  Inhalation 4 milliLiter(s) Inhalation every 6 hours  albuterol/ipratropium for Nebulization 3 milliLiter(s) Nebulizer every 6 hours  aMIOdarone    Tablet 200 milliGRAM(s) Oral daily  aMIOdarone    Tablet   Oral   aspirin  chewable 81 milliGRAM(s) Oral daily  atorvastatin 80 milliGRAM(s) Oral at bedtime  buMETAnide Injectable 2 milliGRAM(s) IV Push every 12 hours  ceftolozane/tazobactam IVPB 450 milliGRAM(s) IV Intermittent every 8 hours  chlorhexidine 0.12% Liquid 15 milliLiter(s) Oral Mucosa two times a day  chlorhexidine 2% Cloths 1 Application(s) Topical <User Schedule>  collagenase Ointment 1 Application(s) Topical two times a day  dextrose 5%. 1000 milliLiter(s) (50 mL/Hr) IV Continuous <Continuous>  dextrose 5%. 1000 milliLiter(s) (100 mL/Hr) IV Continuous <Continuous>  dextrose 50% Injectable 25 Gram(s) IV Push once  dextrose 50% Injectable 12.5 Gram(s) IV Push once  dextrose 50% Injectable 25 Gram(s) IV Push once  diltiazem    milliGRAM(s) Oral daily  glucagon  Injectable 1 milliGRAM(s) IntraMuscular once  heparin   Injectable 5000 Unit(s) SubCutaneous every 12 hours  insulin glargine Injectable (LANTUS) 10 Unit(s) SubCutaneous at bedtime  insulin lispro (ADMELOG) corrective regimen sliding scale   SubCutaneous three times a day before meals  levETIRAcetam  Solution 500 milliGRAM(s) Oral two times a day  pantoprazole  Injectable 40 milliGRAM(s) IV Push every 12 hours  silver sulfADIAZINE 1% Cream 1 Application(s) Topical every 12 hours    MEDICATIONS  (PRN):  acetaminophen     Tablet .. 650 milliGRAM(s) Oral every 6 hours PRN Temp greater or equal to 38C (100.4F), Mild Pain (1 - 3)  dextrose Oral Gel 15 Gram(s) Oral once PRN Blood Glucose LESS THAN 70 milliGRAM(s)/deciliter  midodrine. 2.5 milliGRAM(s) Oral three times a day PRN SBP < 110  sodium chloride 0.9% Bolus. 100 milliLiter(s) IV Bolus every 5 minutes PRN SBP LESS THAN or EQUAL to 80 mmHg                            7.8    9.07  )-----------( 367      ( 17 Jun 2023 08:33 )             25.2     06-17    141  |  102  |  42<H>  ----------------------------<  143<H>  5.0   |  26  |  2.0<H>    Ca    8.4      17 Jun 2023 08:33  Mg     2.3     06-17    TPro  5.4<L>  /  Alb  1.9<L>  /  TBili  0.4  /  DBili  x   /  AST  24  /  ALT  10  /  AlkPhos  256<H>  06-17    Procalcitonin, Serum: 1.38 ng/mL [0.02 - 0.10] (06-12-23 @ 09:44)  Procalcitonin, Serum: 1.12 ng/mL [0.02 - 0.10] (06-11-23 @ 21:02)  Ferritin, Serum: 1956 ng/mL [30 - 400] (06-06-23 @ 10:40)  Procalcitonin, Serum: 2.38 ng/mL [0.02 - 0.10] (05-31-23 @ 05:50)    a/p  62 yo M with  PMH of ICH (2021) s/p trach and PEG, HTN, HLD, T2DM, CAD s/p stents, Recurrent C diff, BIBEMS from [facility] for abnormal labs. Patient non-verbal at baseline - limited history. Per NH chart  have a BUN greater than 200 and creatinine of 4.3 on outpatient labs. Outpatient CXR also w/ new L sided pleural effusion. In ED, patient was hypotensive with Afib with RVR, found to be in acure renal failure, anemic with elevated trops. He was started on Amiodarone GTT, PPI GTT, gastric lavage with coffee ground secretions, started on broad spectrum abx and admitted to MICU  While in MICU, found to be in DKA, s/p insulin drip, MICHELLE started on HD, acute anemia s/p PRBC transfusion, NSTEMI with downtrending trops, now downgraded to SDU -> floor     # Acute Renal Failure, started on HD 6/1  nephro following, cont HD for now, holding off tesio per nephro   creatinine trend  2.0 (06-17-23 @ 08:33)  1.7 (06-16-23 @ 18:00)  2.1 (06-16-23 @ 07:12)  2.0 (06-15-23 @ 16:31)  1.9 (06-15-23 @ 08:41)  1.5 (06-14-23 @ 16:56)  2.4 (06-13-23 @ 19:14)  2.4 (06-13-23 @ 17:05)  2.0 (06-12-23 @ 18:14)      # Candida tropicalis UTI  s/p cefepime and vanc   Blood clts neg, URine with Candida tropicalis   on  fluconazole, changed from IV to PO via gtube     # Acute anemia , suspected gib, not a candidate for any endoscopy.   - HgB 6.7 (baseline 8) s/p 2U pRBC - improved to 9.9, now dropped again, transfuse 1 more unit and followup   - evaluated by GI - no evidence of GI bleed (PEG tube flushed with return of bile and no blood)  - Monitor H/H closely. Hb dropped to 6.6 yesterday >> trend CBC, Transfuse PRN, F/U GI  - Active T+S    # atelectasis/ effusion  ? ct chst ordered  per pulm rec     # electrolyte imbalance : sodium and potassium wnl,    # Afib w/ RVR - now rate controlled   - currently rate controlled s/p amio gtt  - currently on  amiodarone   200 q24H     # CAD s/p stents, NSTEMI  - Trops 1.80-->1.63-->1.57-->1.55  - seen by cardio,  c/w asa and statin, medical management     # DM with  DKA now resolved  - s/p insulin drip, now on basal/bolus and tube feeds. Monitor FS   CAPILLARY BLOOD GLUCOSE    POCT Blood Glucose.: 156 mg/dL (17 Jun 2023 08:17)  POCT Blood Glucose.: 218 mg/dL (16 Jun 2023 20:49)  POCT Blood Glucose.: 137 mg/dL (16 Jun 2023 17:53)  POCT Blood Glucose.: 123 mg/dL (16 Jun 2023 11:57)      # Hx ICH s/p trach and PEG  Bedbound from NH   - c/w keppra 500mg BID for seizure prophylaxis    # Vent dependant resp failure with pleural effusion      < from: CT Chest No Cont (06.15.23 @ 16:09) >  Interval development bilateral large pleural effusions with persistent   lower lobe consolidations.    Intralobular septal thickening in the well aerated portions of the upper   lobes are noted. No pneumothorax or parenchymal mass.    < end of copied text >    pulm followup, may need thoracentesis     # palliative care, dw palliative care team, they will cont following for symptom management     #Progress Note Handoff  Pending: pulm followup, nephro followup   Family discussion: team spoke with wife  Disposition: SNF .

## 2023-06-18 NOTE — PROGRESS NOTE ADULT - ASSESSMENT
IMPRESSION:    Sepsis present on admission  Candida UTI  Pseudomonas pna  left side atelectasis improved  Bilateral pleural effusions likely volume overload   MICHELLE on CKD III on dialysis   NSTEMI likely type 2  Hyponatremia resolved   Paroxysmal Afib  Acute on chronic anemia suspected UGI Bleed  Chronic respiratory failure  H/o ICH s/p trach and PEG  H/o CAD      PLAN:    CNS: Avoid CNS depressants     HEENT: Oral and trach care    PULMONARY: HOB 45. Aspiration.  No vent changes.  Goal saturation 92-96%. Vent dependents.  Pulmonary toilet.     CARDIOVASCULAR: Avoid overload.  Continue rate control.  Volume contraction as tolerated.      GI:  Feeding  Bowel regimen     RENAL: trend CMP.  Correct as needed.  Nephrology following     INFECTIOUS DISEASE: ABX per ID.  Monitor VS     HEMATOLOGICAL: DVT prophylaxis.  LE duplex negative.  Monitor CBC.        ENDOCRINE: Follow up FS. Insulin protocol if needed.    DNR    Poor prognosis

## 2023-06-18 NOTE — PROGRESS NOTE ADULT - SUBJECTIVE AND OBJECTIVE BOX
pt seen and examined.     My notes supersede resident's notes in case of discrepancy       ROS: no cp, no sob, no n/v, no fever    Vital Signs Last 24 Hrs  T(C): 37.2 (18 Jun 2023 05:00), Max: 37.8 (17 Jun 2023 20:04)  T(F): 98.9 (18 Jun 2023 05:00), Max: 100.1 (17 Jun 2023 20:04)  HR: 87 (18 Jun 2023 05:00) (87 - 92)  BP: 110/58 (18 Jun 2023 05:00) (110/58 - 116/63)  BP(mean): --  RR: 18 (18 Jun 2023 05:00) (18 - 21)  SpO2: 98% (18 Jun 2023 05:00) (98% - 100%)    Parameters below as of 17 Jun 2023 20:04  Patient On (Oxygen Delivery Method): ventilator    Physical exam:   constitutional NAD, trach in place, peg in place, multiple pressure ulcers present stage 4 sacral and stage 2 buttock   lungs bilat air entery , cvs RRR, ext has edema    MEDICATIONS  (STANDING):  acetylcysteine 20%  Inhalation 4 milliLiter(s) Inhalation every 6 hours  albuterol/ipratropium for Nebulization 3 milliLiter(s) Nebulizer every 6 hours  aMIOdarone    Tablet 200 milliGRAM(s) Oral daily  aMIOdarone    Tablet   Oral   aspirin  chewable 81 milliGRAM(s) Oral daily  atorvastatin 80 milliGRAM(s) Oral at bedtime  buMETAnide Injectable 2 milliGRAM(s) IV Push every 12 hours  ceftolozane/tazobactam IVPB 450 milliGRAM(s) IV Intermittent every 8 hours  chlorhexidine 0.12% Liquid 15 milliLiter(s) Oral Mucosa two times a day  chlorhexidine 2% Cloths 1 Application(s) Topical <User Schedule>  collagenase Ointment 1 Application(s) Topical two times a day  dextrose 5%. 1000 milliLiter(s) (50 mL/Hr) IV Continuous <Continuous>  dextrose 5%. 1000 milliLiter(s) (100 mL/Hr) IV Continuous <Continuous>  dextrose 50% Injectable 25 Gram(s) IV Push once  dextrose 50% Injectable 12.5 Gram(s) IV Push once  dextrose 50% Injectable 25 Gram(s) IV Push once  diltiazem    milliGRAM(s) Oral daily  glucagon  Injectable 1 milliGRAM(s) IntraMuscular once  heparin   Injectable 5000 Unit(s) SubCutaneous every 12 hours  insulin glargine Injectable (LANTUS) 10 Unit(s) SubCutaneous at bedtime  insulin lispro (ADMELOG) corrective regimen sliding scale   SubCutaneous three times a day before meals  levETIRAcetam  Solution 500 milliGRAM(s) Oral two times a day  pantoprazole  Injectable 40 milliGRAM(s) IV Push every 12 hours  silver sulfADIAZINE 1% Cream 1 Application(s) Topical every 12 hours    MEDICATIONS  (PRN):  acetaminophen     Tablet .. 650 milliGRAM(s) Oral every 6 hours PRN Temp greater or equal to 38C (100.4F), Mild Pain (1 - 3)  dextrose Oral Gel 15 Gram(s) Oral once PRN Blood Glucose LESS THAN 70 milliGRAM(s)/deciliter  midodrine. 2.5 milliGRAM(s) Oral three times a day PRN SBP < 110  sodium chloride 0.9% Bolus. 100 milliLiter(s) IV Bolus every 5 minutes PRN SBP LESS THAN or EQUAL to 80 mmHg                        7.6    9.39  )-----------( 429      ( 18 Jun 2023 08:25 )             24.6     06-18    140  |  103  |  51<H>  ----------------------------<  152<H>  4.8   |  26  |  2.3<H>    Ca    8.4      18 Jun 2023 08:25  Mg     2.1     06-18    TPro  5.1<L>  /  Alb  1.6<L>  /  TBili  0.3  /  DBili  x   /  AST  27  /  ALT  12  /  AlkPhos  261<H>  06-18    Procalcitonin, Serum: 1.38 ng/mL [0.02 - 0.10] (06-12-23 @ 09:44)  Procalcitonin, Serum: 1.12 ng/mL [0.02 - 0.10] (06-11-23 @ 21:02)  Ferritin, Serum: 1956 ng/mL [30 - 400] (06-06-23 @ 10:40)  Procalcitonin, Serum: 2.38 ng/mL [0.02 - 0.10] (05-31-23 @ 05:50)    a/p  # 64 yo M with  PMH of ICH (2021) s/p trach and PEG, HTN, HLD, T2DM, CAD s/p stents, Recurrent C diff, BIBEMS from [facility] for abnormal labs. Patient non-verbal at baseline - limited history. Per NH chart  have a BUN greater than 200 and creatinine of 4.3 on outpatient labs. Outpatient CXR also w/ new L sided pleural effusion. In ED, patient was hypotensive with Afib with RVR, found to be in acure renal failure, anemic with elevated trops. He was started on Amiodarone GTT, PPI GTT, gastric lavage with coffee ground secretions, started on broad spectrum abx and admitted to MICU  While in MICU, found to be in DKA, s/p insulin drip, MICHELLE started on HD, acute anemia s/p PRBC transfusion, NSTEMI with downtrending trops, now downgraded to SDU -> floor     # Acute Renal Failure, started on HD 6/1  nephro following, cont HD for now, holding off tesio per nephro   creatinine trend  2.3 (06-18-23 @ 08:25)  2.1 (06-17-23 @ 18:08)  2.0 (06-17-23 @ 08:33)  1.7 (06-16-23 @ 18:00)  2.1 (06-16-23 @ 07:12)      # Candida tropicalis UTI  s/p cefepime and vanc   Blood clts neg, URine with Candida tropicalis   on  fluconazole, changed from IV to PO via gtube     # Acute anemia , suspected gib, not a candidate for any endoscopy.   - HgB 6.7 (baseline 8) s/p 2U pRBC - improved to 9.9, now dropped again, transfuse 1 more unit and followup   - evaluated by GI - no evidence of GI bleed (PEG tube flushed with return of bile and no blood)  - Monitor H/H closely. Hb dropped to 6.6 yesterday >> trend CBC, Transfuse PRN, F/U GI  - Active T+S    # atelectasis/ effusion  ? ct chst ordered  per pulm rec     # electrolyte imbalance : sodium and potassium wnl,    # Afib w/ RVR - now rate controlled   - currently rate controlled s/p amio gtt  - currently on  amiodarone   200 q24H     # CAD s/p stents, NSTEMI  - Trops 1.80-->1.63-->1.57-->1.55  - seen by cardio,  c/w asa and statin, medical management     # DM with  DKA now resolved  - s/p insulin drip, now on basal/bolus and tube feeds. Monitor FS   CAPILLARY BLOOD GLUCOSE    POCT Blood Glucose.: 125 mg/dL (18 Jun 2023 12:07)  POCT Blood Glucose.: 140 mg/dL (18 Jun 2023 07:52)  POCT Blood Glucose.: 200 mg/dL (17 Jun 2023 21:03)  POCT Blood Glucose.: 153 mg/dL (17 Jun 2023 18:49)  POCT Blood Glucose.: 180 mg/dL (17 Jun 2023 16:45)      # Hx ICH s/p trach and PEG  Bedbound from NH   - c/w keppra 500mg BID for seizure prophylaxis    # Vent dependant resp failure with pleural effusion      < from: CT Chest No Cont (06.15.23 @ 16:09) >  Interval development bilateral large pleural effusions with persistent   lower lobe consolidations.    Intralobular septal thickening in the well aerated portions of the upper   lobes are noted. No pneumothorax or parenchymal mass.    < end of copied text >    pulm followup, may need thoracentesis         # palliative care, dw palliative care team, they will cont following for symptom management     #Progress Note Handoff  Pending: pulm followup, nephro followup   Family discussion: team spoke with wife  Disposition: SNF .

## 2023-06-18 NOTE — PROGRESS NOTE ADULT - SUBJECTIVE AND OBJECTIVE BOX
Patient is a 63y old  Male who presents with a chief complaint of Abnormal labs (17 Jun 2023 11:33)          Over Night Events:  remains on MV.  Off pressors.          ROS:     All ROS are negative except HPI         PHYSICAL EXAM    ICU Vital Signs Last 24 Hrs  T(C): 37.2 (18 Jun 2023 05:00), Max: 37.8 (17 Jun 2023 20:04)  T(F): 98.9 (18 Jun 2023 05:00), Max: 100.1 (17 Jun 2023 20:04)  HR: 87 (18 Jun 2023 05:00) (87 - 92)  BP: 110/58 (18 Jun 2023 05:00) (110/58 - 116/63)  BP(mean): --  ABP: --  ABP(mean): --  RR: 18 (18 Jun 2023 05:00) (18 - 21)  SpO2: 98% (18 Jun 2023 05:00) (98% - 100%)    O2 Parameters below as of 17 Jun 2023 20:04  Patient On (Oxygen Delivery Method): ventilator            CONSTITUTIONAL:  In NAD    ENT:   Airway patent,   Mouth with normal mucosa.   Trach    EYES:   Pupils equal,   Round and reactive to light.    CARDIAC:   Normal rate,   Regular rhythm.    No edema      RESPIRATORY:   No wheezing  Bilateral BS  Normal chest expansion  Not tachypneic,  No use of accessory muscles    GASTROINTESTINAL:  Abdomen soft,   Non-tender,   No guarding,   + BS    MUSCULOSKELETAL:   Contracted,  No clubbing, cyanosis    NEUROLOGICAL:   opens eyes  Does not follow commands.    SKIN:   Skin normal color for race,   No evidence of rash.        06-16-23 @ 07:01  -  06-17-23 @ 07:00  --------------------------------------------------------  IN:  Total IN: 0 mL    OUT:    Other (mL): 2000 mL  Total OUT: 2000 mL    Total NET: -2000 mL          LABS:                            7.8    9.07  )-----------( 367      ( 17 Jun 2023 08:33 )             25.2                                               06-17    138  |  102  |  45<H>  ----------------------------<  151<H>  5.0   |  26  |  2.1<H>    Ca    8.4      17 Jun 2023 18:08  Mg     2.3     06-17    TPro  5.4<L>  /  Alb  1.9<L>  /  TBili  0.4  /  DBili  x   /  AST  24  /  ALT  10  /  AlkPhos  256<H>  06-17                                                                                           LIVER FUNCTIONS - ( 17 Jun 2023 08:33 )  Alb: 1.9 g/dL / Pro: 5.4 g/dL / ALK PHOS: 256 U/L / ALT: 10 U/L / AST: 24 U/L / GGT: x                                                                                               Mode: AC/ CMV (Assist Control/ Continuous Mandatory Ventilation)  RR (machine): 14  TV (machine): 450  FiO2: 50  PEEP: 5  ITime: 0.8  MAP: 12  PIP: 27                                          MEDICATIONS  (STANDING):  acetylcysteine 20%  Inhalation 4 milliLiter(s) Inhalation every 6 hours  albuterol/ipratropium for Nebulization 3 milliLiter(s) Nebulizer every 6 hours  aMIOdarone    Tablet 200 milliGRAM(s) Oral daily  aMIOdarone    Tablet   Oral   aspirin  chewable 81 milliGRAM(s) Oral daily  atorvastatin 80 milliGRAM(s) Oral at bedtime  buMETAnide Injectable 2 milliGRAM(s) IV Push every 12 hours  ceftolozane/tazobactam IVPB 450 milliGRAM(s) IV Intermittent every 8 hours  chlorhexidine 0.12% Liquid 15 milliLiter(s) Oral Mucosa two times a day  chlorhexidine 2% Cloths 1 Application(s) Topical <User Schedule>  collagenase Ointment 1 Application(s) Topical two times a day  dextrose 5%. 1000 milliLiter(s) (50 mL/Hr) IV Continuous <Continuous>  dextrose 5%. 1000 milliLiter(s) (100 mL/Hr) IV Continuous <Continuous>  dextrose 50% Injectable 25 Gram(s) IV Push once  dextrose 50% Injectable 12.5 Gram(s) IV Push once  dextrose 50% Injectable 25 Gram(s) IV Push once  diltiazem    milliGRAM(s) Oral daily  glucagon  Injectable 1 milliGRAM(s) IntraMuscular once  heparin   Injectable 5000 Unit(s) SubCutaneous every 12 hours  insulin glargine Injectable (LANTUS) 10 Unit(s) SubCutaneous at bedtime  insulin lispro (ADMELOG) corrective regimen sliding scale   SubCutaneous three times a day before meals  levETIRAcetam  Solution 500 milliGRAM(s) Oral two times a day  pantoprazole  Injectable 40 milliGRAM(s) IV Push every 12 hours  silver sulfADIAZINE 1% Cream 1 Application(s) Topical every 12 hours    MEDICATIONS  (PRN):  acetaminophen     Tablet .. 650 milliGRAM(s) Oral every 6 hours PRN Temp greater or equal to 38C (100.4F), Mild Pain (1 - 3)  dextrose Oral Gel 15 Gram(s) Oral once PRN Blood Glucose LESS THAN 70 milliGRAM(s)/deciliter  midodrine. 2.5 milliGRAM(s) Oral three times a day PRN SBP < 110  sodium chloride 0.9% Bolus. 100 milliLiter(s) IV Bolus every 5 minutes PRN SBP LESS THAN or EQUAL to 80 mmHg      New X-rays reviewed:                                                                                  ECHO

## 2023-06-19 NOTE — PROGRESS NOTE ADULT - SUBJECTIVE AND OBJECTIVE BOX
Patient is a 63y old  Male who presents with a chief complaint of Abnormal labs (19 Jun 2023 07:19)        Over Night Events:  on MV.  Off pressors.         ROS:     All ROS are negative except HPI         PHYSICAL EXAM    ICU Vital Signs Last 24 Hrs  T(C): 36.7 (19 Jun 2023 04:53), Max: 36.8 (18 Jun 2023 22:05)  T(F): 98.1 (19 Jun 2023 04:53), Max: 98.2 (18 Jun 2023 22:05)  HR: 85 (19 Jun 2023 04:53) (85 - 89)  BP: 119/66 (19 Jun 2023 04:53) (117/65 - 119/66)  BP(mean): --  ABP: --  ABP(mean): --  RR: 18 (19 Jun 2023 04:53) (18 - 18)  SpO2: 100% (19 Jun 2023 04:53) (98% - 100%)    O2 Parameters below as of 19 Jun 2023 04:53  Patient On (Oxygen Delivery Method): ventilator            CONSTITUTIONAL:  Ill appearing in NAD    ENT:   Airway patent,   Mouth with normal mucosa.   Trach     EYES:   Pupils equal,   Round and reactive to light.    CARDIAC:   Normal rate,   Regular rhythm.    No edema    RESPIRATORY:   No wheezing  Bilateral BS  Normal chest expansion  Not tachypneic,  No use of accessory muscles    GASTROINTESTINAL:  Abdomen soft,   Non-tender,   No guarding,   + BS    MUSCULOSKELETAL:   Contracted   No clubbing, cyanosis    NEUROLOGICAL:   Does not follow commands     SKIN:   Skin normal color for race,         06-18-23 @ 07:01  -  06-19-23 @ 07:00  --------------------------------------------------------  IN:    Enteral Tube Flush: 60 mL    Peptamen A.F.: 275 mL  Total IN: 335 mL    OUT:  Total OUT: 0 mL    Total NET: 335 mL          LABS:                            7.6    9.39  )-----------( 429      ( 18 Jun 2023 08:25 )             24.6                                               06-18    139  |  103  |  53<H>  ----------------------------<  153<H>  5.6<H>   |  24  |  2.4<H>    Ca    8.3<L>      18 Jun 2023 16:00  Mg     2.1     06-18    TPro  5.1<L>  /  Alb  1.6<L>  /  TBili  0.3  /  DBili  x   /  AST  27  /  ALT  12  /  AlkPhos  261<H>  06-18                                                                                           LIVER FUNCTIONS - ( 18 Jun 2023 08:25 )  Alb: 1.6 g/dL / Pro: 5.1 g/dL / ALK PHOS: 261 U/L / ALT: 12 U/L / AST: 27 U/L / GGT: x                                                                                               Mode: AC/ CMV (Assist Control/ Continuous Mandatory Ventilation)  RR (machine): 14  TV (machine): 450  FiO2: 50  PEEP: 5  PS:   ITime: 0.8  MAP: 13  PIP: 25                                          MEDICATIONS  (STANDING):  acetylcysteine 20%  Inhalation 4 milliLiter(s) Inhalation every 6 hours  albuterol/ipratropium for Nebulization 3 milliLiter(s) Nebulizer every 6 hours  aMIOdarone    Tablet 200 milliGRAM(s) Oral daily  aMIOdarone    Tablet   Oral   aspirin  chewable 81 milliGRAM(s) Oral daily  atorvastatin 80 milliGRAM(s) Oral at bedtime  buMETAnide Injectable 2 milliGRAM(s) IV Push every 12 hours  ceftolozane/tazobactam IVPB 450 milliGRAM(s) IV Intermittent every 8 hours  chlorhexidine 0.12% Liquid 15 milliLiter(s) Oral Mucosa two times a day  chlorhexidine 2% Cloths 1 Application(s) Topical <User Schedule>  collagenase Ointment 1 Application(s) Topical two times a day  dextrose 5%. 1000 milliLiter(s) (50 mL/Hr) IV Continuous <Continuous>  dextrose 5%. 1000 milliLiter(s) (100 mL/Hr) IV Continuous <Continuous>  dextrose 50% Injectable 25 Gram(s) IV Push once  dextrose 50% Injectable 12.5 Gram(s) IV Push once  dextrose 50% Injectable 25 Gram(s) IV Push once  diltiazem    milliGRAM(s) Oral daily  glucagon  Injectable 1 milliGRAM(s) IntraMuscular once  heparin   Injectable 5000 Unit(s) SubCutaneous every 12 hours  insulin glargine Injectable (LANTUS) 10 Unit(s) SubCutaneous at bedtime  insulin lispro (ADMELOG) corrective regimen sliding scale   SubCutaneous three times a day before meals  levETIRAcetam  Solution 500 milliGRAM(s) Oral two times a day  pantoprazole  Injectable 40 milliGRAM(s) IV Push every 12 hours  silver sulfADIAZINE 1% Cream 1 Application(s) Topical every 12 hours    MEDICATIONS  (PRN):  acetaminophen     Tablet .. 650 milliGRAM(s) Oral every 6 hours PRN Temp greater or equal to 38C (100.4F), Mild Pain (1 - 3)  dextrose Oral Gel 15 Gram(s) Oral once PRN Blood Glucose LESS THAN 70 milliGRAM(s)/deciliter  midodrine. 2.5 milliGRAM(s) Oral three times a day PRN SBP < 110  sodium chloride 0.9% Bolus. 100 milliLiter(s) IV Bolus every 5 minutes PRN SBP LESS THAN or EQUAL to 80 mmHg      New X-rays reviewed:                                                                                  ECHO    CXR interpreted by me:

## 2023-06-19 NOTE — PROGRESS NOTE ADULT - SUBJECTIVE AND OBJECTIVE BOX
seen and examined  24 h events noted         PAST HISTORY  --------------------------------------------------------------------------------  No significant changes to PMH, PSH, FHx, SHx, unless otherwise noted    ALLERGIES & MEDICATIONS  --------------------------------------------------------------------------------  Allergies    penicillin (Other)    Intolerances      Standing Inpatient Medications  acetylcysteine 20%  Inhalation 4 milliLiter(s) Inhalation every 6 hours  albuterol/ipratropium for Nebulization 3 milliLiter(s) Nebulizer every 6 hours  aMIOdarone    Tablet 200 milliGRAM(s) Oral daily  aMIOdarone    Tablet   Oral   aspirin  chewable 81 milliGRAM(s) Oral daily  atorvastatin 80 milliGRAM(s) Oral at bedtime  buMETAnide Injectable 2 milliGRAM(s) IV Push every 12 hours  ceftolozane/tazobactam IVPB 450 milliGRAM(s) IV Intermittent every 8 hours  chlorhexidine 0.12% Liquid 15 milliLiter(s) Oral Mucosa two times a day  chlorhexidine 2% Cloths 1 Application(s) Topical <User Schedule>  collagenase Ointment 1 Application(s) Topical two times a day  dextrose 5%. 1000 milliLiter(s) IV Continuous <Continuous>  dextrose 5%. 1000 milliLiter(s) IV Continuous <Continuous>  dextrose 50% Injectable 25 Gram(s) IV Push once  dextrose 50% Injectable 12.5 Gram(s) IV Push once  dextrose 50% Injectable 25 Gram(s) IV Push once  diltiazem    milliGRAM(s) Oral daily  glucagon  Injectable 1 milliGRAM(s) IntraMuscular once  heparin   Injectable 5000 Unit(s) SubCutaneous every 12 hours  insulin glargine Injectable (LANTUS) 10 Unit(s) SubCutaneous at bedtime  insulin lispro (ADMELOG) corrective regimen sliding scale   SubCutaneous three times a day before meals  levETIRAcetam  Solution 500 milliGRAM(s) Oral two times a day  pantoprazole  Injectable 40 milliGRAM(s) IV Push every 12 hours  silver sulfADIAZINE 1% Cream 1 Application(s) Topical every 12 hours    PRN Inpatient Medications  acetaminophen     Tablet .. 650 milliGRAM(s) Oral every 6 hours PRN  dextrose Oral Gel 15 Gram(s) Oral once PRN  midodrine. 2.5 milliGRAM(s) Oral three times a day PRN  sodium chloride 0.9% Bolus. 100 milliLiter(s) IV Bolus every 5 minutes PRN        VITALS/PHYSICAL EXAM  --------------------------------------------------------------------------------  T(C): 36.7 (06-19-23 @ 04:53), Max: 36.8 (06-18-23 @ 22:05)  HR: 85 (06-19-23 @ 04:53) (85 - 89)  BP: 119/66 (06-19-23 @ 04:53) (117/65 - 119/66)  RR: 18 (06-19-23 @ 04:53) (18 - 18)  SpO2: 100% (06-19-23 @ 04:53) (98% - 100%)  Wt(kg): --        06-18-23 @ 07:01  -  06-19-23 @ 07:00  --------------------------------------------------------  IN: 335 mL / OUT: 0 mL / NET: 335 mL      Physical Exam:  	Gen: NAD,  	Pulm: B/L aline   	CV:  S1S2; no rub  	Abd: +distended  	LE: edema  	Vascular access: udall     LABS/STUDIES  --------------------------------------------------------------------------------              7.6    9.39  >-----------<  429      [06-18-23 @ 08:25]              24.6     139  |  103  |  53  ----------------------------<  153      [06-18-23 @ 16:00]  5.6   |  24  |  2.4        Ca     8.3     [06-18-23 @ 16:00]      Mg     2.1     [06-18-23 @ 08:25]    TPro  5.1  /  Alb  1.6  /  TBili  0.3  /  DBili  x   /  AST  27  /  ALT  12  /  AlkPhos  261  [06-18-23 @ 08:25]      Creatinine Trend:  SCr 2.4 [06-18 @ 16:00]  SCr 2.3 [06-18 @ 08:25]  SCr 2.1 [06-17 @ 18:08]  SCr 2.0 [06-17 @ 08:33]  SCr 1.7 [06-16 @ 18:00]    Urinalysis - [06-11-23 @ 18:25]      Color Yellow / Appearance Slightly Turbid / SG 1.016 / pH 8.0      Gluc Negative / Ketone Trace  / Bili Small / Urobili 6 mg/dL       Blood Moderate / Protein 100 mg/dL / Leuk Est Large / Nitrite Negative      RBC 72 /  / Hyaline 12 / Gran  / Sq Epi  / Non Sq Epi  / Bacteria Few      Iron 51, TIBC 111, %sat 46      [06-06-23 @ 10:40]  Ferritin 1956      [06-06-23 @ 10:40]  Vitamin D (25OH) 70      [06-03-23 @ 06:50]    HBsAb <3.0      [06-01-23 @ 22:50]  HBsAb Nonreact      [06-01-23 @ 22:50]  HBsAg Nonreact      [06-01-23 @ 22:50]  HBcAb Nonreact      [06-01-23 @ 22:50]  HIV Nonreact      [06-08-23 @ 16:00]  HIV Nonreact      [06-08-23 @ 12:20]

## 2023-06-19 NOTE — PROGRESS NOTE ADULT - ASSESSMENT
Acute Renal Failure, started on HD 6/1  Fluid Overload  HAGMA  - non oliguric, RBUS without hydro   - started on HD 6/1 via R IJ [place]   - Cr much improved s/p HD yesterday, now 1.9  f/u Nephro recs  - c/w bumex 2mg iv q12hr, HD per renal--> HD today   - c/w midodrine 10mg q8hr  - c/w sodium bicarb 1300mg PO TID  -  TTE 6/1 - EF 63%, GIDD   - nephrology following   - HD today -> if cr not improving will need TDC as per nephro.   - IR consult for TDC    #Possible pseudomonal pneumonia   - History of resistant pseudomonal infection  - Chest Xray worsening   - fu repeat chest xray  - fu ID consult  - spike fever last night - suspect VAP  - Switch gentamicin to ceftolozane/tazobactam    Candida tropicalis UTI  s/p cefepime and vanc   Blood clts neg, URine with Candida tropicalis   on  fluconazole, ID following     Acute anemia  - HgB 7.9 this AM (baseline 8)   - sp 1 unit yesterday  - evaluated by GI - no evidence of GI bleed (PEG tube flushed with return of bile and no blood)  - Monitor H/H closely  - Active T+S  - sp multiple units on this hospitilization    Hyponatremia, likely hypervolemic  - c/w bumex 2mg iv q12hr and HD per nephro  - daily BMP    Afib w/ RVR - now rate controlled   - currently rate controlled s/p amio gtt  - change amio to 200 q24H   - started [id number] mg daily as he went back into afib rvr yest.  - now rate controlled    CAD s/p stents  NSTEMI  - Trops 1.80-->1.63-->1.57-->1.55  - seen by cardio,  c/w asa and statin, medical management     DKA, resolved  - s/p insulin drip, now on basal/bolus and tube feeds. Monitor FS     Hx ICH s/p trach and PEG  Bedbound from NH   - c/w keppra 500mg BID for seizure prophylaxis    DNR only, overall prognosis is poor  - Wife would like to speak to palliative before making decision on CMO    #Unstageable sacral ulcer  - Burn consult    Spoke to wife 6/16: She would like to hold off on TDC until she discusses with her kids. She understands that prognosis is poor.

## 2023-06-19 NOTE — PROGRESS NOTE ADULT - SUBJECTIVE AND OBJECTIVE BOX
Patient is a 63y old  Male who presents with a chief complaint of Abnormal labs (19 Jun 2023 07:43)      INTERVAL HPI/OVERNIGHT EVENTS:  None    T(C): 36.7 (06-19-23 @ 04:53), Max: 36.8 (06-18-23 @ 22:05)  HR: 85 (06-19-23 @ 04:53) (85 - 89)  BP: 119/66 (06-19-23 @ 04:53) (117/65 - 119/66)  RR: 18 (06-19-23 @ 04:53) (18 - 18)  SpO2: 100% (06-19-23 @ 04:53) (98% - 100%)  Wt(kg): --Vital Signs Last 24 Hrs  T(C): 36.7 (19 Jun 2023 04:53), Max: 36.8 (18 Jun 2023 22:05)  T(F): 98.1 (19 Jun 2023 04:53), Max: 98.2 (18 Jun 2023 22:05)  HR: 85 (19 Jun 2023 04:53) (85 - 89)  BP: 119/66 (19 Jun 2023 04:53) (117/65 - 119/66)  BP(mean): --  RR: 18 (19 Jun 2023 04:53) (18 - 18)  SpO2: 100% (19 Jun 2023 04:53) (98% - 100%)    Parameters below as of 19 Jun 2023 04:53  Patient On (Oxygen Delivery Method): ventilator        PHYSICAL EXAM:  GENERAL: NAD, well-groomed, well-developed  HEAD:  Atraumatic, Normocephalic  EYES: EOMI, PERRLA, conjunctiva and sclera clear  ENMT: No tonsillar erythema, exudates, or enlargement; Moist mucous membranes, Good dentition, No lesions  NECK: Supple, No JVD, Normal thyroid  NERVOUS SYSTEM:  Alert & Oriented X3, Good concentration; Motor Strength 5/5 B/L upper and lower extremities; DTRs 2+ intact and symmetric  CHEST/LUNG: Clear to percussion bilaterally; No rales, rhonchi, wheezing, or rubs  HEART: Regular rate and rhythm; No murmurs, rubs, or gallops  ABDOMEN: Soft, Nontender, Nondistended; Bowel sounds present  EXTREMITIES:  2+ Peripheral Pulses, No clubbing, cyanosis, or edema  LYMPH: No lymphadenopathy noted  SKIN: No rashes or lesions      acetaminophen     Tablet .. 650 milliGRAM(s) Oral every 6 hours PRN  acetylcysteine 20%  Inhalation 4 milliLiter(s) Inhalation every 6 hours  albuterol/ipratropium for Nebulization 3 milliLiter(s) Nebulizer every 6 hours  aMIOdarone    Tablet 200 milliGRAM(s) Oral daily  aMIOdarone    Tablet   Oral   aspirin  chewable 81 milliGRAM(s) Oral daily  atorvastatin 80 milliGRAM(s) Oral at bedtime  buMETAnide Injectable 2 milliGRAM(s) IV Push every 12 hours  ceftolozane/tazobactam IVPB 450 milliGRAM(s) IV Intermittent every 8 hours  chlorhexidine 0.12% Liquid 15 milliLiter(s) Oral Mucosa two times a day  chlorhexidine 2% Cloths 1 Application(s) Topical <User Schedule>  collagenase Ointment 1 Application(s) Topical two times a day  dextrose 5%. 1000 milliLiter(s) IV Continuous <Continuous>  dextrose 5%. 1000 milliLiter(s) IV Continuous <Continuous>  dextrose 50% Injectable 25 Gram(s) IV Push once  dextrose 50% Injectable 12.5 Gram(s) IV Push once  dextrose 50% Injectable 25 Gram(s) IV Push once  dextrose Oral Gel 15 Gram(s) Oral once PRN  diltiazem    milliGRAM(s) Oral daily  glucagon  Injectable 1 milliGRAM(s) IntraMuscular once  heparin   Injectable 5000 Unit(s) SubCutaneous every 12 hours  insulin glargine Injectable (LANTUS) 10 Unit(s) SubCutaneous at bedtime  insulin lispro (ADMELOG) corrective regimen sliding scale   SubCutaneous three times a day before meals  levETIRAcetam  Solution 500 milliGRAM(s) Oral two times a day  midodrine. 2.5 milliGRAM(s) Oral three times a day PRN  pantoprazole  Injectable 40 milliGRAM(s) IV Push every 12 hours  silver sulfADIAZINE 1% Cream 1 Application(s) Topical every 12 hours  sodium chloride 0.9% Bolus. 100 milliLiter(s) IV Bolus every 5 minutes PRN    Mode: AC/ CMV (Assist Control/ Continuous Mandatory Ventilation), RR (machine): 14, TV (machine): 450, FiO2: 50, PEEP: 5, PS: , ITime: 0.8, MAP: 13, PIP: 25  HEALTH ISSUES - PROBLEM Dx:  Sepsis    Acute UTI    History of intracranial hemorrhage    Palliative care by specialist    Anemia    MICHELLE (acute kidney injury)    Pain

## 2023-06-19 NOTE — PROGRESS NOTE ADULT - SUBJECTIVE AND OBJECTIVE BOX
pt seen and examined.     My notes supersede resident's notes in case of discrepancy       ROS: no cp, no sob, no n/v, no fever    Vital Signs Last 24 Hrs  T(C): 36.8 (19 Jun 2023 12:28), Max: 36.8 (18 Jun 2023 22:05)  T(F): 98.2 (19 Jun 2023 12:28), Max: 98.2 (18 Jun 2023 22:05)  HR: 89 (19 Jun 2023 12:28) (85 - 89)  BP: 117/60 (19 Jun 2023 12:28) (117/60 - 119/66)  RR: 18 (19 Jun 2023 12:28) (18 - 18)  SpO2: 100% (19 Jun 2023 12:28) (98% - 100%)    Parameters below as of 19 Jun 2023 08:33  Patient On (Oxygen Delivery Method): ventilator    O2 Concentration (%): 50    physical exam  constitutional NAD, AAOX3, Respiratory  lungs CTA, CVS heart RRR, GI: abdomen Soft NT, ND, BS+, skin: intact  neuro exam Motor, sensory and CN normal, no deficit     MEDICATIONS  (STANDING):  acetylcysteine 20%  Inhalation 4 milliLiter(s) Inhalation every 6 hours  albuterol/ipratropium for Nebulization 3 milliLiter(s) Nebulizer every 6 hours  aMIOdarone    Tablet 200 milliGRAM(s) Oral daily  aMIOdarone    Tablet   Oral   aspirin  chewable 81 milliGRAM(s) Oral daily  atorvastatin 80 milliGRAM(s) Oral at bedtime  buMETAnide Injectable 2 milliGRAM(s) IV Push every 12 hours  ceftolozane/tazobactam IVPB 450 milliGRAM(s) IV Intermittent every 8 hours  chlorhexidine 0.12% Liquid 15 milliLiter(s) Oral Mucosa two times a day  chlorhexidine 2% Cloths 1 Application(s) Topical <User Schedule>  collagenase Ointment 1 Application(s) Topical two times a day  dextrose 5%. 1000 milliLiter(s) (50 mL/Hr) IV Continuous <Continuous>  dextrose 5%. 1000 milliLiter(s) (100 mL/Hr) IV Continuous <Continuous>  dextrose 50% Injectable 25 Gram(s) IV Push once  dextrose 50% Injectable 12.5 Gram(s) IV Push once  dextrose 50% Injectable 25 Gram(s) IV Push once  diltiazem    milliGRAM(s) Oral daily  glucagon  Injectable 1 milliGRAM(s) IntraMuscular once  heparin   Injectable 5000 Unit(s) SubCutaneous every 12 hours  insulin glargine Injectable (LANTUS) 10 Unit(s) SubCutaneous at bedtime  insulin lispro (ADMELOG) corrective regimen sliding scale   SubCutaneous three times a day before meals  levETIRAcetam  Solution 500 milliGRAM(s) Oral two times a day  pantoprazole  Injectable 40 milliGRAM(s) IV Push every 12 hours  silver sulfADIAZINE 1% Cream 1 Application(s) Topical every 12 hours    MEDICATIONS  (PRN):  acetaminophen     Tablet .. 650 milliGRAM(s) Oral every 6 hours PRN Temp greater or equal to 38C (100.4F), Mild Pain (1 - 3)  dextrose Oral Gel 15 Gram(s) Oral once PRN Blood Glucose LESS THAN 70 milliGRAM(s)/deciliter  midodrine. 2.5 milliGRAM(s) Oral three times a day PRN SBP < 110  sodium chloride 0.9% Bolus. 100 milliLiter(s) IV Bolus every 5 minutes PRN SBP LESS THAN or EQUAL to 80 mmHg                        7.4    11.84 )-----------( 585      ( 19 Jun 2023 10:06 )             24.4     06-19    139  |  102  |  60<H>  ----------------------------<  205<H>  5.0   |  27  |  2.5<H>    Ca    8.4      19 Jun 2023 10:06  Phos  0.8     06-19  Mg     2.2     06-19    TPro  5.6<L>  /  Alb  1.9<L>  /  TBili  0.3  /  DBili  x   /  AST  45<H>  /  ALT  25  /  AlkPhos  309<H>  06-19    Procalcitonin, Serum: 1.38 ng/mL [0.02 - 0.10] (06-12-23 @ 09:44)  Procalcitonin, Serum: 1.12 ng/mL [0.02 - 0.10] (06-11-23 @ 21:02)  Ferritin, Serum: 1956 ng/mL [30 - 400] (06-06-23 @ 10:40)  Procalcitonin, Serum: 2.38 ng/mL [0.02 - 0.10] (05-31-23 @ 05:50)    a/p    64 yo M with  PMH of ICH (2021) s/p trach and PEG, HTN, HLD, T2DM, CAD s/p stents, Recurrent C diff, BIBEMS from [facility] for abnormal labs. Patient non-verbal at baseline - limited history. Per NH chart  have a BUN greater than 200 and creatinine of 4.3 on outpatient labs. Outpatient CXR also w/ new L sided pleural effusion. In ED, patient was hypotensive with Afib with RVR, found to be in acure renal failure, anemic with elevated trops. He was started on Amiodarone GTT, PPI GTT, gastric lavage with coffee ground secretions, started on broad spectrum abx and admitted to MICU  While in MICU, found to be in DKA, s/p insulin drip, MICHELLE started on HD, acute anemia s/p PRBC transfusion, NSTEMI with downtrending trops, now downgraded to SDU -> floor     # Acute Renal Failure, started on HD 6/1  nephro following, cont HD for now, holding off tesio per nephro   creatinine trend  2.5 (06-19-23 @ 10:06)  2.3 (06-18-23 @ 08:25)  2.0 (06-17-23 @ 08:33)  2.1 (06-16-23 @ 07:12)  1.9 (06-15-23 @ 08:41)  1.5 (06-14-23 @ 16:56)    Potassium, Serum: 5.0 mmol/L (06.19.23 @ 10:06)    # Candida tropicalis UTI  s/p cefepime and vanc   Blood clts neg, URine with Candida tropicalis   on  fluconazole, changed from IV to PO via G- tube     # Acute anemia , suspected gib, not a candidate for any endoscopy.   - HgB 6.7 (baseline 8) s/p 2U pRBC - improved to 9.9, now dropped again, transfuse 1 more unit and followup   - evaluated by GI - no evidence of GI bleed (PEG tube flushed with return of bile and no blood)  - Monitor H/H closely. Hb dropped to 6.6 yesterday >> trend CBC, Transfuse PRN, F/U GI  - Active T+S    # atelectasis/ effusion, fu pulmonary     # electrolyte imbalance : sodium and potassium wnl,    # Afib w/ RVR - now rate controlled   - currently rate controlled s/p amio gtt  - currently on  amiodarone   200 q24H     # CAD s/p stents, NSTEMI  - Trops 1.80-->1.63-->1.57-->1.55  - seen by cardio,  c/w asa and statin, medical management     # DM with  DKA now resolved  - s/p insulin drip, now on basal/bolus and tube feeds. Monitor FS     CAPILLARY BLOOD GLUCOSE  POCT Blood Glucose.: 189 mg/dL (19 Jun 2023 11:46)  POCT Blood Glucose.: 205 mg/dL (19 Jun 2023 08:02)  POCT Blood Glucose.: 176 mg/dL (19 Jun 2023 06:16)  POCT Blood Glucose.: 171 mg/dL (18 Jun 2023 18:46)    # Hx ICH s/p trach and PEG  Bedbound from NH   - c/w keppra 500mg BID for seizure prophylaxis    # Vent dependant resp failure with pleural effusion      < from: CT Chest No Cont (06.15.23 @ 16:09) >  Interval development bilateral large pleural effusions with persistent   lower lobe consolidations.    Intralobular septal thickening in the well aerated portions of the upper   lobes are noted. No pneumothorax or parenchymal mass.    < end of copied text >    pulm followup, may need thoracentesis     # palliative care, dw palliative care team, they will cont following for symptom management     #Progress Note Handoff  Pending: pulm followup, nephro followup   Family discussion: team spoke with wife  Disposition: SNF when stable                          pt seen and examined. note is for 6/19 visit , modified on 6/20     My notes supersede resident's notes in case of discrepancy       ROS: no cp, no sob, no n/v, no fever    Vital Signs Last 24 Hrs  T(C): 36.8 (19 Jun 2023 12:28), Max: 36.8 (18 Jun 2023 22:05)  T(F): 98.2 (19 Jun 2023 12:28), Max: 98.2 (18 Jun 2023 22:05)  HR: 89 (19 Jun 2023 12:28) (85 - 89)  BP: 117/60 (19 Jun 2023 12:28) (117/60 - 119/66)  RR: 18 (19 Jun 2023 12:28) (18 - 18)  SpO2: 100% (19 Jun 2023 12:28) (98% - 100%)    Parameters below as of 19 Jun 2023 08:33  Patient On (Oxygen Delivery Method): ventilator    O2 Concentration (%): 50    physical exam  constitutional NAD, non verbal, trach in place, site is clean, peg in place, site is clean, abd not distended, Respiratory  lungs CTA, CVS heart RRR, GI: abdomen Soft NT, ND, BS+, peg in place, skin stage 4 sacral and stage 2 buttock pressure ulcer   neuro exam unable to participate ,  left hand has contracture     MEDICATIONS  (STANDING):  acetylcysteine 20%  Inhalation 4 milliLiter(s) Inhalation every 6 hours  albuterol/ipratropium for Nebulization 3 milliLiter(s) Nebulizer every 6 hours  aMIOdarone    Tablet 200 milliGRAM(s) Oral daily  aMIOdarone    Tablet   Oral   aspirin  chewable 81 milliGRAM(s) Oral daily  atorvastatin 80 milliGRAM(s) Oral at bedtime  buMETAnide Injectable 2 milliGRAM(s) IV Push every 12 hours  ceftolozane/tazobactam IVPB 450 milliGRAM(s) IV Intermittent every 8 hours  chlorhexidine 0.12% Liquid 15 milliLiter(s) Oral Mucosa two times a day  chlorhexidine 2% Cloths 1 Application(s) Topical <User Schedule>  collagenase Ointment 1 Application(s) Topical two times a day  dextrose 5%. 1000 milliLiter(s) (50 mL/Hr) IV Continuous <Continuous>  dextrose 5%. 1000 milliLiter(s) (100 mL/Hr) IV Continuous <Continuous>  dextrose 50% Injectable 25 Gram(s) IV Push once  dextrose 50% Injectable 12.5 Gram(s) IV Push once  dextrose 50% Injectable 25 Gram(s) IV Push once  diltiazem    milliGRAM(s) Oral daily  glucagon  Injectable 1 milliGRAM(s) IntraMuscular once  heparin   Injectable 5000 Unit(s) SubCutaneous every 12 hours  insulin glargine Injectable (LANTUS) 10 Unit(s) SubCutaneous at bedtime  insulin lispro (ADMELOG) corrective regimen sliding scale   SubCutaneous three times a day before meals  levETIRAcetam  Solution 500 milliGRAM(s) Oral two times a day  pantoprazole  Injectable 40 milliGRAM(s) IV Push every 12 hours  silver sulfADIAZINE 1% Cream 1 Application(s) Topical every 12 hours    MEDICATIONS  (PRN):  acetaminophen     Tablet .. 650 milliGRAM(s) Oral every 6 hours PRN Temp greater or equal to 38C (100.4F), Mild Pain (1 - 3)  dextrose Oral Gel 15 Gram(s) Oral once PRN Blood Glucose LESS THAN 70 milliGRAM(s)/deciliter  midodrine. 2.5 milliGRAM(s) Oral three times a day PRN SBP < 110  sodium chloride 0.9% Bolus. 100 milliLiter(s) IV Bolus every 5 minutes PRN SBP LESS THAN or EQUAL to 80 mmHg                        7.4    11.84 )-----------( 585      ( 19 Jun 2023 10:06 )             24.4     06-19    139  |  102  |  60<H>  ----------------------------<  205<H>  5.0   |  27  |  2.5<H>    Ca    8.4      19 Jun 2023 10:06  Phos  0.8     06-19  Mg     2.2     06-19    TPro  5.6<L>  /  Alb  1.9<L>  /  TBili  0.3  /  DBili  x   /  AST  45<H>  /  ALT  25  /  AlkPhos  309<H>  06-19    Procalcitonin, Serum: 1.38 ng/mL [0.02 - 0.10] (06-12-23 @ 09:44)  Procalcitonin, Serum: 1.12 ng/mL [0.02 - 0.10] (06-11-23 @ 21:02)  Ferritin, Serum: 1956 ng/mL [30 - 400] (06-06-23 @ 10:40)  Procalcitonin, Serum: 2.38 ng/mL [0.02 - 0.10] (05-31-23 @ 05:50)    a/p    62 yo M with  PMH of ICH (2021) s/p trach and PEG, HTN, HLD, T2DM, CAD s/p stents, Recurrent C diff, BIBEMS from [facility] for abnormal labs. Patient non-verbal at baseline - limited history. Per NH chart  have a BUN greater than 200 and creatinine of 4.3 on outpatient labs. Outpatient CXR also w/ new L sided pleural effusion. In ED, patient was hypotensive with Afib with RVR, found to be in acure renal failure, anemic with elevated trops. He was started on Amiodarone GTT, PPI GTT, gastric lavage with coffee ground secretions, started on broad spectrum abx and admitted to MICU  While in MICU, found to be in DKA, s/p insulin drip, MICHELLE started on HD, acute anemia s/p PRBC transfusion, NSTEMI with downtrending trops, now downgraded to SDU -> floor     # Acute Renal Failure, started on HD 6/1  nephro following, cont HD for now, holding off tesio per nephro   creatinine trend  2.5 (06-19-23 @ 10:06)  2.3 (06-18-23 @ 08:25)  2.0 (06-17-23 @ 08:33)  2.1 (06-16-23 @ 07:12)  1.9 (06-15-23 @ 08:41)  1.5 (06-14-23 @ 16:56)    Potassium, Serum: 5.0 mmol/L (06.19.23 @ 10:06)    # Candida tropicalis UTI  s/p cefepime and vanc   Blood clts neg, URine with Candida tropicalis   on  fluconazole, changed from IV to PO via G- tube     # Acute anemia , suspected gib, not a candidate for any endoscopy.   - HgB 6.7 (baseline 8) s/p 2U pRBC - improved to 9.9, now dropped again, transfuse 1 more unit and followup   - evaluated by GI - no evidence of GI bleed (PEG tube flushed with return of bile and no blood)  - Monitor H/H closely. Hb dropped to 6.6 yesterday >> trend CBC, Transfuse PRN, F/U GI  - Active T+S    # atelectasis/ effusion, fu pulmonary     # electrolyte imbalance : sodium and potassium wnl,    # Afib w/ RVR - now rate controlled   - currently rate controlled s/p amio gtt  - currently on  amiodarone   200 q24H     # CAD s/p stents, NSTEMI  - Trops 1.80-->1.63-->1.57-->1.55  - seen by cardio,  c/w asa and statin, medical management     # DM with  DKA now resolved  - s/p insulin drip, now on basal/bolus and tube feeds. Monitor FS     CAPILLARY BLOOD GLUCOSE  POCT Blood Glucose.: 189 mg/dL (19 Jun 2023 11:46)  POCT Blood Glucose.: 205 mg/dL (19 Jun 2023 08:02)  POCT Blood Glucose.: 176 mg/dL (19 Jun 2023 06:16)  POCT Blood Glucose.: 171 mg/dL (18 Jun 2023 18:46)    # Hx ICH s/p trach and PEG  Bedbound from NH   - c/w keppra 500mg BID for seizure prophylaxis    # Vent dependant resp failure with pleural effusion      < from: CT Chest No Cont (06.15.23 @ 16:09) >  Interval development bilateral large pleural effusions with persistent   lower lobe consolidations.    Intralobular septal thickening in the well aerated portions of the upper   lobes are noted. No pneumothorax or parenchymal mass.    < end of copied text >    pulm followup, may need thoracentesis     # palliative care, dw palliative care team, they will cont following for symptom management     #Progress Note Handoff  Pending: pulm followup, nephro followup   Family discussion: team spoke with wife  Disposition: SNF when stable

## 2023-06-19 NOTE — PROGRESS NOTE ADULT - ASSESSMENT
62 yo M with  PMH of ICH (2021) s/p trach and PEG, HTN, HLD, T2DM, CAD s/p stents, Recurrent C diff, BIBEMS from [facility] for abnormal labs. Patient non-verbal at baseline - limited history. Per NH chart  have a BUN greater than 200 and creatinine of 4.3 on outpatient labs. Outpatient CXR also w/ new L sided pleural effusion. With EMS patient was also found to be in irregular rhythm, no known history of A-fib or a flutter.  MICHELLE on CKD 3a - severe azotemia  likely due to GIB / hypotension  Started HD on 6/1/23  Acute anemia d/t UGIB  Troponemia  Lactic acidosis resolved   DKA resolved   Afib   -  repeat labs today / will decide on HD after checking labs   - continue bumex , document accurate UO   - feed : blake juarez noted / lokelma 10   - repeat h/h / last sat elevated   - last  phos noted / no binders   - hold on TDC placement for now  - very poor prognosis   will follow

## 2023-06-19 NOTE — PROGRESS NOTE ADULT - ASSESSMENT
IMPRESSION:    Sepsis present on admission  Candida UTI  Pseudomonas pna  left side atelectasis improved  Bilateral pleural effusions likely volume overload   MICHELLE on CKD III on dialysis   NSTEMI likely type 2  Hyponatremia resolved   Paroxysmal Afib  Acute on chronic anemia suspected UGI Bleed  Chronic respiratory failure  H/o ICH s/p trach and PEG  H/o CAD      PLAN:    CNS: Avoid CNS depressants     HEENT: Oral and trach care    PULMONARY: HOB 45. Aspiration.  No vent changes.  Goal saturation 92-96%. Vent dependents.  Pulmonary toilet.     CARDIOVASCULAR: Avoid overload.  Continue rate control.  Volume contraction as tolerated.      GI:  Feeding  Bowel regimen     RENAL: trend CMP.  Correct as needed.  Nephrology following.  HD  per renal     INFECTIOUS DISEASE: ABX per ID.  Monitor VS     HEMATOLOGICAL: DVT prophylaxis.  LE duplex negative.  Monitor CBC.        ENDOCRINE: Follow up FS. Insulin protocol if needed.    DNR    Poor prognosis

## 2023-06-20 NOTE — PHARMACOTHERAPY INTERVENTION NOTE - COMMENTS
Modified penicillin allergy history to state that patient tolerated cefepime during this admission.    Leslee Roberson, PharmD, [facility]  Clinical Pharmacy Specialist, Infectious Diseases  Tele-Antimicrobial Stewardship Program (Tele-ASP)  Tele-ASP Phone: [phone]  
Modified penicillin allergy history to state that patient tolerated ceftolozane-tazobactam during this admission. 
Recommended switching gentamicin to ceftolozane-tazobactam 2.25g IV x1, followed by 450mg IV q8h (HD dosing) for the treatment of pneumonia and UTI due to carbapenem-resistant Pseudomonas aeruginosa. 
Adjusted order for fluconazole so last day is 6/15 per Dr. Harper's recommendation.

## 2023-06-20 NOTE — PROGRESS NOTE ADULT - SUBJECTIVE AND OBJECTIVE BOX
ALICIA BARBOUR 63y Male      Patient is a 63y old  Male who presents with a chief complaint of Abnormal labs (20 Jun 2023 10:12)        INTERVAL HPI/OVERNIGHT EVENTS: No acute events overnight. Patient was seen and evaluated at the bedside. The patient denies pain. Vitals stable. Patient denies fever/chills, chest pain, shortness of breath, abdominal pain, headaches, nausea/vomiting, and diarrhea/constipation.      PHYSICAL EXAM:  GENERAL: NAD  HEAD:  Normocephalic  EYES:  conjunctiva and sclera clear  ENMT: Moist mucous membranes  NECK: Supple  NERVOUS SYSTEM:  Alert, awake  CHEST/LUNG: Good air exchange bilaterally, no wheeze, Trach in place  HEART: Regular rate and rhythm        Vital Signs Last 24 Hrs  T(C): 36.1 (20 Jun 2023 05:00), Max: 36.8 (19 Jun 2023 21:02)  T(F): 97 (20 Jun 2023 05:00), Max: 98.2 (19 Jun 2023 21:02)  HR: 81 (20 Jun 2023 12:50) (75 - 87)  BP: 159/73 (20 Jun 2023 12:50) (101/58 - 159/73)  BP(mean): --  RR: 18 (20 Jun 2023 12:50) (18 - 18)  SpO2: 98% (20 Jun 2023 12:50) (98% - 100%)    Parameters below as of 20 Jun 2023 12:50  Patient On (Oxygen Delivery Method): ventilator    O2 Concentration (%): 40      Consultant(s) Notes Reviewed:  [X] YES  [ ] NO  Care Discussed with Consultants/Other Providers [X] YES  [ ] NO  Imaging Personally Reviewed:  [X] YES  [ ] NO      LABS:                        7.7    10.59 )-----------( 616      ( 20 Jun 2023 08:46 )             25.1     06-20    139  |  102  |  68<HH>  ----------------------------<  185<H>  6.2<HH>   |  27  |  2.8<H>    Ca    8.7      20 Jun 2023 08:46  Phos  0.8     06-19  Mg     2.2     06-20    TPro  5.6<L>  /  Alb  1.9<L>  /  TBili  0.3  /  DBili  x   /  AST  33  /  ALT  21  /  AlkPhos  275<H>  06-20          CAPILLARY BLOOD GLUCOSE      POCT Blood Glucose.: 188 mg/dL (20 Jun 2023 11:22)  POCT Blood Glucose.: 173 mg/dL (20 Jun 2023 08:01)  POCT Blood Glucose.: 218 mg/dL (19 Jun 2023 21:16)  POCT Blood Glucose.: 216 mg/dL (19 Jun 2023 17:21)

## 2023-06-20 NOTE — PROGRESS NOTE ADULT - ASSESSMENT
IMPRESSION:    Sepsis present on admission  Candida UTI  Pseudomonas pna  left side atelectasis improved  Bilateral pleural effusions likely volume overload   MICHELLE on CKD III on dialysis   NSTEMI likely type 2  Hyponatremia resolved   Paroxysmal Afib  Acute on chronic anemia suspected UGI Bleed  Chronic respiratory failure  H/o ICH s/p trach and PEG  H/o CAD      PLAN:    CNS: Avoid CNS depressants     HEENT: Oral and trach care    PULMONARY: HOB 45. Aspiration.  No vent changes.  Goal saturation 92-96%. Vent dependents.  Pulmonary toilet.     CARDIOVASCULAR: Avoid overload.  Continue rate control.  Continue Volume contraction as tolerated.      GI:  Feeding  Bowel regimen     RENAL: trend CMP.  Correct as needed.  Nephrology following.  HD  per renal     INFECTIOUS DISEASE: ABX per ID.  Monitor VS     HEMATOLOGICAL: DVT prophylaxis.  LE duplex negative.  Monitor CBC.        ENDOCRINE: Follow up FS. Insulin protocol if needed.    DNR    Poor prognosis

## 2023-06-20 NOTE — PROGRESS NOTE ADULT - SUBJECTIVE AND OBJECTIVE BOX
Patient is a 63y old  Male who presents with a chief complaint of Abnormal labs (20 Jun 2023 07:51)        Over Night Events:  Remains on MV.  off pressors.          ROS:     All ROS are negative except HPI         PHYSICAL EXAM    ICU Vital Signs Last 24 Hrs  T(C): 36.1 (20 Jun 2023 05:00), Max: 36.8 (19 Jun 2023 12:28)  T(F): 97 (20 Jun 2023 05:00), Max: 98.2 (19 Jun 2023 12:28)  HR: 75 (20 Jun 2023 05:00) (75 - 89)  BP: 101/58 (20 Jun 2023 05:00) (101/58 - 121/61)  BP(mean): --  ABP: --  ABP(mean): --  RR: 18 (20 Jun 2023 05:00) (18 - 18)  SpO2: 100% (20 Jun 2023 05:00) (100% - 100%)    O2 Parameters below as of 19 Jun 2023 21:02  Patient On (Oxygen Delivery Method): ventilator    O2 Concentration (%): 40        CONSTITUTIONAL:  Ill appearing in NAD    ENT:   Airway patent,   Mouth with normal mucosa.       EYES:   Pupils equal,   Round and reactive to light.    CARDIAC:   Normal rate,   Regular rhythm.      RESPIRATORY:   No wheezing  Bilateral BS  Normal chest expansion  Not tachypneic,  No use of accessory muscles    GASTROINTESTINAL:  Abdomen soft,   Non-tender,   No guarding,   + BS    MUSCULOSKELETAL:   Contracted   No clubbing, cyanosis    NEUROLOGICAL:   Does not follow commands     SKIN:   Skin normal color for race,   No evidence of rash.        06-19-23 @ 07:01  -  06-20-23 @ 07:00  --------------------------------------------------------  IN:    Enteral Tube Flush: 60 mL    Peptamen A.F.: 375 mL  Total IN: 435 mL    OUT:  Total OUT: 0 mL    Total NET: 435 mL          LABS:                            7.7    10.59 )-----------( 616      ( 20 Jun 2023 08:46 )             25.1                                               06-20    139  |  102  |  68<HH>  ----------------------------<  185<H>  6.2<HH>   |  27  |  2.8<H>    Ca    8.7      20 Jun 2023 08:46  Phos  0.8     06-19  Mg     2.2     06-20    TPro  5.6<L>  /  Alb  1.9<L>  /  TBili  0.3  /  DBili  x   /  AST  33  /  ALT  21  /  AlkPhos  275<H>  06-20                                                                                           LIVER FUNCTIONS - ( 20 Jun 2023 08:46 )  Alb: 1.9 g/dL / Pro: 5.6 g/dL / ALK PHOS: 275 U/L / ALT: 21 U/L / AST: 33 U/L / GGT: x                                                                                               Mode: AC/ CMV (Assist Control/ Continuous Mandatory Ventilation)  RR (machine): 14  TV (machine): 450  FiO2: 50  PEEP: 5  ITime: 0.8  MAP: 20  PIP: 30                                          MEDICATIONS  (STANDING):  acetylcysteine 20%  Inhalation 4 milliLiter(s) Inhalation every 6 hours  albuterol/ipratropium for Nebulization 3 milliLiter(s) Nebulizer every 6 hours  aMIOdarone    Tablet   Oral   aMIOdarone    Tablet 200 milliGRAM(s) Oral daily  aspirin  chewable 81 milliGRAM(s) Oral daily  atorvastatin 80 milliGRAM(s) Oral at bedtime  buMETAnide Injectable 2 milliGRAM(s) IV Push every 12 hours  ceftolozane/tazobactam IVPB 450 milliGRAM(s) IV Intermittent every 8 hours  chlorhexidine 0.12% Liquid 15 milliLiter(s) Oral Mucosa two times a day  chlorhexidine 2% Cloths 1 Application(s) Topical <User Schedule>  collagenase Ointment 1 Application(s) Topical two times a day  dextrose 5%. 1000 milliLiter(s) (100 mL/Hr) IV Continuous <Continuous>  dextrose 5%. 1000 milliLiter(s) (50 mL/Hr) IV Continuous <Continuous>  dextrose 50% Injectable 25 Gram(s) IV Push once  dextrose 50% Injectable 25 Gram(s) IV Push once  dextrose 50% Injectable 50 milliLiter(s) IV Push once  dextrose 50% Injectable 12.5 Gram(s) IV Push once  diltiazem    milliGRAM(s) Oral daily  glucagon  Injectable 1 milliGRAM(s) IntraMuscular once  heparin   Injectable 5000 Unit(s) SubCutaneous every 12 hours  insulin glargine Injectable (LANTUS) 10 Unit(s) SubCutaneous at bedtime  insulin lispro (ADMELOG) corrective regimen sliding scale   SubCutaneous three times a day before meals  insulin regular  human recombinant 10 Unit(s) IV Push once  levETIRAcetam  Solution 500 milliGRAM(s) Oral two times a day  pantoprazole  Injectable 40 milliGRAM(s) IV Push every 12 hours  silver sulfADIAZINE 1% Cream 1 Application(s) Topical every 12 hours  sodium phosphate 30 milliMole(s)/500 mL IVPB 30 milliMole(s) IV Intermittent once  sodium zirconium cyclosilicate 10 Gram(s) Oral every 12 hours    MEDICATIONS  (PRN):  acetaminophen     Tablet .. 650 milliGRAM(s) Oral every 6 hours PRN Temp greater or equal to 38C (100.4F), Mild Pain (1 - 3)  dextrose Oral Gel 15 Gram(s) Oral once PRN Blood Glucose LESS THAN 70 milliGRAM(s)/deciliter  midodrine. 2.5 milliGRAM(s) Oral three times a day PRN SBP < 110  sodium chloride 0.9% Bolus. 100 milliLiter(s) IV Bolus every 5 minutes PRN SBP LESS THAN or EQUAL to 80 mmHg      New X-rays reviewed:                                                                                  ECHO

## 2023-06-20 NOTE — PROGRESS NOTE ADULT - ASSESSMENT
64 yo M with  PMH of ICH (2021) s/p trach and PEG, HTN, HLD, T2DM, CAD s/p stents, Recurrent C diff, BIBEMS from [facility] for abnormal labs. Patient non-verbal at baseline - limited history. Per NH chart  have a BUN greater than 200 and creatinine of 4.3 on outpatient labs. Outpatient CXR also w/ new L sided pleural effusion. In ED, patient was hypotensive with Afib with RVR, found to be in acure renal failure, anemic with elevated trops. He was started on Amiodarone GTT, PPI GTT, gastric lavage with coffee ground secretions, started on broad spectrum abx and admitted to MICU  While in MICU, found to be in DKA, s/p insulin drip, MICHELLE started on HD, acute anemia s/p PRBC transfusion, NSTEMI with downtrending trops, downgraded to SDU -> floor    Acute Renal Failure, started on HD 6/1  Fluid Overload  HAGMA  - non oliguric, RBUS without hydro   - started on HD 6/1 via R [facility] [place]   - Cr much improved s/p HD yesterday, now 2.8  - nephro on board- HD today 6/20  - continue bumex , document accurate UO   - c/w midodrine 10mg q8hr  - c/w sodium bicarb 1300mg PO TID  -  TTE 6/1 - EF 63%, GIDD   - f/u Nephro about TDC    #fever, suspected VAP  #Candiduria with U cx C tropicalis  -  CXR Further significant increase in left-sided opacities/effusion with minimal aeration of the left lung. Right-sided opacities redemonstrated    6/11 BCX NGTD     6/11 UCX   >100,000 CFU/ml Pseudomonas aeruginosa    6/11 sputum   Three or more mixed gram negative rods including    Moderate Pseudomonas aeruginosa (Carbapenem Resistant)    6/7 BCX NGTD   - fu repeat chest xray  - ID consult 6/14: Recommended switching gentamicin to ceftolozane-tazobactam 2.25g IV x1, followed by 450mg IV q8h (HD dosing) for the treatment of pneumonia and UTI due to carbapenem-resistant Pseudomonas aeruginosa x 7 days  - fluconazole, end 6/15    Acute anemia  - HgB 7.9 this AM (baseline 8)   - evaluated by GI - no evidence of GI bleed (PEG tube flushed with return of bile and no blood)  - Monitor H/H closely  - Active T+S  - sp multiple units on this hospitilization    Hyponatremia, likely hypervolemic  - c/w bumex 2mg iv q12hr and HD per nephro  - daily BMP    Afib w/ RVR - now rate controlled   - currently rate controlled s/p amio gtt  - change amio to 200 q24H   - started [id number] mg daily as he went back into afib rvr   - now rate controlled    CAD s/p stents  NSTEMI  - Trops 1.80-->1.63-->1.57-->1.55  - seen by cardio,  c/w asa and statin, medical management     DKA, resolved  - s/p insulin drip, now on basal/bolus and tube feeds. Monitor FS     Hx ICH s/p trach and PEG  Bedbound from NH   - c/w keppra 500mg BID for seizure prophylaxis    DNR only, overall prognosis is poor  - Wife would like to speak to palliative before making decision on CMO    #Unstageable sacral ulcer  - Burn consult    DVT Ppx: HSQ  GI Ppx: Pantoprazole 40mg BID  Diet: Peg tube  Activity: bedrest

## 2023-06-20 NOTE — PROGRESS NOTE ADULT - SUBJECTIVE AND OBJECTIVE BOX
pt seen and examined.     My notes supersede resident's notes in case of discrepancy       ROS: no cp, no sob, no n/v, no fever    Vital Signs Last 24 Hrs  T(C): 36.1 (20 Jun 2023 05:00), Max: 36.8 (19 Jun 2023 21:02)  T(F): 97 (20 Jun 2023 05:00), Max: 98.2 (19 Jun 2023 21:02)  HR: 81 (20 Jun 2023 12:50) (75 - 87)  BP: 159/73 (20 Jun 2023 12:50) (101/58 - 159/73)  BP(mean): --  RR: 18 (20 Jun 2023 12:50) (18 - 18)  SpO2: 98% (20 Jun 2023 12:50) (98% - 100%)    Parameters below as of 20 Jun 2023 12:50  Patient On (Oxygen Delivery Method): ventilator    O2 Concentration (%): 40    physical exam  constitutional NAD, non verbal, trach in place, site is clean, peg in place, site is clean, abd not distended, Respiratory  lungs CTA, CVS heart RRR, GI: abdomen Soft NT, ND, BS+, peg in place, skin stage 4 sacral and stage 2 buttock pressure ulcer   neuro exam unable to participate ,  left hand has contracture     MEDICATIONS  (STANDING):  acetylcysteine 20%  Inhalation 4 milliLiter(s) Inhalation every 6 hours  albuterol/ipratropium for Nebulization 3 milliLiter(s) Nebulizer every 6 hours  aMIOdarone    Tablet 200 milliGRAM(s) Oral daily  aspirin  chewable 81 milliGRAM(s) Oral daily  atorvastatin 80 milliGRAM(s) Oral at bedtime  buMETAnide Injectable 2 milliGRAM(s) IV Push every 12 hours  ceftolozane/tazobactam IVPB 450 milliGRAM(s) IV Intermittent every 8 hours  chlorhexidine 0.12% Liquid 15 milliLiter(s) Oral Mucosa two times a day  chlorhexidine 2% Cloths 1 Application(s) Topical <User Schedule>  collagenase Ointment 1 Application(s) Topical two times a day  dextrose 5%. 1000 milliLiter(s) (100 mL/Hr) IV Continuous <Continuous>  dextrose 5%. 1000 milliLiter(s) (50 mL/Hr) IV Continuous <Continuous>  dextrose 50% Injectable 25 Gram(s) IV Push once  dextrose 50% Injectable 25 Gram(s) IV Push once  dextrose 50% Injectable 12.5 Gram(s) IV Push once  diltiazem    milliGRAM(s) Oral daily  glucagon  Injectable 1 milliGRAM(s) IntraMuscular once  heparin   Injectable 5000 Unit(s) SubCutaneous every 12 hours  insulin glargine Injectable (LANTUS) 10 Unit(s) SubCutaneous at bedtime  insulin lispro (ADMELOG) corrective regimen sliding scale   SubCutaneous three times a day before meals  levETIRAcetam  Solution 500 milliGRAM(s) Oral two times a day  pantoprazole  Injectable 40 milliGRAM(s) IV Push every 12 hours  silver sulfADIAZINE 1% Cream 1 Application(s) Topical every 12 hours  sodium phosphate 30 milliMole(s)/500 mL IVPB 30 milliMole(s) IV Intermittent once  sodium zirconium cyclosilicate 10 Gram(s) Oral every 12 hours    MEDICATIONS  (PRN):  acetaminophen     Tablet .. 650 milliGRAM(s) Oral every 6 hours PRN Temp greater or equal to 38C (100.4F), Mild Pain (1 - 3)  dextrose Oral Gel 15 Gram(s) Oral once PRN Blood Glucose LESS THAN 70 milliGRAM(s)/deciliter  midodrine. 2.5 milliGRAM(s) Oral three times a day PRN SBP < 110  sodium chloride 0.9% Bolus. 100 milliLiter(s) IV Bolus every 5 minutes PRN SBP LESS THAN or EQUAL to 100 mmHg  sodium chloride 0.9% Bolus. 100 milliLiter(s) IV Bolus every 5 minutes PRN SBP LESS THAN or EQUAL to 80 mmHg                            7.7    10.59 )-----------( 616      ( 20 Jun 2023 08:46 )             25.1     06-20    139  |  102  |  68<HH>  ----------------------------<  185<H>  6.2<HH>   |  27  |  2.8<H>    Ca    8.7      20 Jun 2023 08:46  Phos  0.8     06-19  Mg     2.2     06-20    TPro  5.6<L>  /  Alb  1.9<L>  /  TBili  0.3  /  DBili  x   /  AST  33  /  ALT  21  /  AlkPhos  275<H>  06-20    Procalcitonin, Serum: 1.38 ng/mL [0.02 - 0.10] (06-12-23 @ 09:44)  Procalcitonin, Serum: 1.12 ng/mL [0.02 - 0.10] (06-11-23 @ 21:02)  Ferritin, Serum: 1956 ng/mL [30 - 400] (06-06-23 @ 10:40)  Procalcitonin, Serum: 2.38 ng/mL [0.02 - 0.10] (05-31-23 @ 05:50)    a/p  64 yo M with  PMH of ICH (2021) s/p trach and PEG, HTN, HLD, T2DM, CAD s/p stents, Recurrent C diff, BIBEMS from [facility] for abnormal labs. Patient non-verbal at baseline - limited history. Per NH chart  have a BUN greater than 200 and creatinine of 4.3 on outpatient labs. Outpatient CXR also w/ new L sided pleural effusion. In ED, patient was hypotensive with Afib with RVR, found to be in acure renal failure, anemic with elevated trops. He was started on Amiodarone GTT, PPI GTT, gastric lavage with coffee ground secretions, started on broad spectrum abx and admitted to MICU  While in MICU, found to be in DKA, s/p insulin drip, MICHELLE started on HD, acute anemia s/p PRBC transfusion, NSTEMI with downtrending trops, now downgraded to SDU -> floor     # Acute Renal Failure, started on HD 6/1  nephro following, cont HD for now, holding off tesio per nephro   creatinine trend  2.8 (06-20-23 @ 08:46)  2.5 (06-19-23 @ 10:06)  2.3 (06-18-23 @ 08:25)  2.0 (06-17-23 @ 08:33)  2.1 (06-16-23 @ 07:12)  2.0 (06-15-23 @ 16:31)    Potassium, Serum: 6.2:  (06.20.23 @ 08:46)    fu nephro     # Candida tropicalis UTI  s/p cefepime and vanc   Blood clts neg, URine with Candida tropicalis   on  fluconazole, changed from IV to PO via G- tube     # Acute anemia , suspected gib, not a candidate for any endoscopy.   Hemoglobin: 7.7 g/dL (06-20-23 @ 08:46)  Hemoglobin: 7.4 g/dL (06-19-23 @ 10:06)  Hemoglobin: 7.6 g/dL (06-18-23 @ 08:25)  Hemoglobin: 7.8 g/dL (06-17-23 @ 08:33)    # atelectasis/ effusion, fu pulmonary     # electrolyte imbalance : sodium and potassium wnl,    # Afib w/ RVR - now rate controlled   - currently rate controlled s/p amio gtt  - currently on  amiodarone   200 q24H     # CAD s/p stents, NSTEMI  - Trops 1.80-->1.63-->1.57-->1.55  - seen by cardio,  c/w asa and statin, medical management     # DM with  DKA now resolved  - s/p insulin drip, now on basal/bolus and tube feeds. Monitor FS     CAPILLARY BLOOD GLUCOSE  POCT Blood Glucose.: 188 mg/dL (20 Jun 2023 11:22)  POCT Blood Glucose.: 173 mg/dL (20 Jun 2023 08:01)  POCT Blood Glucose.: 218 mg/dL (19 Jun 2023 21:16)  POCT Blood Glucose.: 216 mg/dL (19 Jun 2023 17:21)    # Hx ICH s/p trach and PEG  Bedbound from NH   - c/w keppra 500mg BID for seizure prophylaxis    # Vent dependant resp failure with pleural effusion      < from: CT Chest No Cont (06.15.23 @ 16:09) >  Interval development bilateral large pleural effusions with persistent   lower lobe consolidations.    Intralobular septal thickening in the well aerated portions of the upper   lobes are noted. No pneumothorax or parenchymal mass.    < end of copied text >    pulm followup, may need thoracentesis     # palliative care, dw palliative care team, they will cont following for symptom management     #Progress Note Handoff  Pending: pulm followup, nephro followup   Family discussion: team spoke with wife  Disposition: SNF when stable

## 2023-06-20 NOTE — PROGRESS NOTE ADULT - ASSESSMENT
62 yo M with  PMH of ICH (2021) s/p trach and PEG, HTN, HLD, T2DM, CAD s/p stents, Recurrent C diff, BIBEMS from [facility] for abnormal labs. Patient non-verbal at baseline - limited history. Per NH chart  have a BUN greater than 200 and creatinine of 4.3 on outpatient labs. Outpatient CXR also w/ new L sided pleural effusion. With EMS patient was also found to be in irregular rhythm, no known history of A-fib or a flutter.  MICHELLE on CKD 3a - severe azotemia  likely due to GIB / hypotension  Started HD on 6/1/23  Acute anemia d/t UGIB  Troponemia  Lactic acidosis resolved   DKA resolved   Afib   -  repeat labs today / cr stable   - continue bumex , document accurate UO   - feed : blake k noted / lokelma 10 q   12   - repeat h/h / last sat elevated   - please replete ph   - bladder scan q shift  - very poor prognosis   will follow

## 2023-06-20 NOTE — PROGRESS NOTE ADULT - SUBJECTIVE AND OBJECTIVE BOX
seen and examined  24 h events noted       PAST HISTORY  --------------------------------------------------------------------------------  No significant changes to PMH, PSH, FHx, SHx, unless otherwise noted    ALLERGIES & MEDICATIONS  --------------------------------------------------------------------------------  Allergies    penicillin (Other)    Intolerances      Standing Inpatient Medications  acetylcysteine 20%  Inhalation 4 milliLiter(s) Inhalation every 6 hours  albuterol/ipratropium for Nebulization 3 milliLiter(s) Nebulizer every 6 hours  aMIOdarone    Tablet 200 milliGRAM(s) Oral daily  aMIOdarone    Tablet   Oral   aspirin  chewable 81 milliGRAM(s) Oral daily  atorvastatin 80 milliGRAM(s) Oral at bedtime  buMETAnide Injectable 2 milliGRAM(s) IV Push every 12 hours  ceftolozane/tazobactam IVPB 450 milliGRAM(s) IV Intermittent every 8 hours  chlorhexidine 0.12% Liquid 15 milliLiter(s) Oral Mucosa two times a day  chlorhexidine 2% Cloths 1 Application(s) Topical <User Schedule>  collagenase Ointment 1 Application(s) Topical two times a day  dextrose 5%. 1000 milliLiter(s) IV Continuous <Continuous>  dextrose 5%. 1000 milliLiter(s) IV Continuous <Continuous>  dextrose 50% Injectable 25 Gram(s) IV Push once  dextrose 50% Injectable 25 Gram(s) IV Push once  dextrose 50% Injectable 12.5 Gram(s) IV Push once  diltiazem    milliGRAM(s) Oral daily  glucagon  Injectable 1 milliGRAM(s) IntraMuscular once  heparin   Injectable 5000 Unit(s) SubCutaneous every 12 hours  insulin glargine Injectable (LANTUS) 10 Unit(s) SubCutaneous at bedtime  insulin lispro (ADMELOG) corrective regimen sliding scale   SubCutaneous three times a day before meals  levETIRAcetam  Solution 500 milliGRAM(s) Oral two times a day  pantoprazole  Injectable 40 milliGRAM(s) IV Push every 12 hours  silver sulfADIAZINE 1% Cream 1 Application(s) Topical every 12 hours    PRN Inpatient Medications  acetaminophen     Tablet .. 650 milliGRAM(s) Oral every 6 hours PRN  dextrose Oral Gel 15 Gram(s) Oral once PRN  midodrine. 2.5 milliGRAM(s) Oral three times a day PRN  sodium chloride 0.9% Bolus. 100 milliLiter(s) IV Bolus every 5 minutes PRN          VITALS/PHYSICAL EXAM  --------------------------------------------------------------------------------  T(C): 36.1 (06-20-23 @ 05:00), Max: 36.8 (06-19-23 @ 12:28)  HR: 75 (06-20-23 @ 05:00) (75 - 89)  BP: 101/58 (06-20-23 @ 05:00) (101/58 - 121/61)  RR: 18 (06-20-23 @ 05:00) (18 - 18)  SpO2: 100% (06-20-23 @ 05:00) (100% - 100%)  Wt(kg): --        06-19-23 @ 07:01  -  06-20-23 @ 07:00  --------------------------------------------------------  IN: 435 mL / OUT: 0 mL / NET: 435 mL      Physical Exam:  	Gen: trach/vent   	Pulm: B/L aline   	CV: S1S2; no rub  	Abd: +distended  	LE: edema  	Vascular access:    LABS/STUDIES  --------------------------------------------------------------------------------              7.4    11.84 >-----------<  585      [06-19-23 @ 10:06]              24.4     138  |  103  |  62  ----------------------------<  201      [06-19-23 @ 18:28]  5.7   |  26  |  2.6        Ca     8.6     [06-19-23 @ 18:28]      Mg     2.2     [06-19-23 @ 10:06]      Phos  0.8     [06-19-23 @ 10:06]    TPro  5.6  /  Alb  1.9  /  TBili  0.3  /  DBili  x   /  AST  45  /  ALT  25  /  AlkPhos  309  [06-19-23 @ 10:06]      Creatinine Trend:  SCr 2.6 [06-19 @ 18:28]  SCr 2.5 [06-19 @ 10:06]  SCr 2.4 [06-18 @ 16:00]  SCr 2.3 [06-18 @ 08:25]  SCr 2.1 [06-17 @ 18:08]    Urinalysis - [06-11-23 @ 18:25]      Color Yellow / Appearance Slightly Turbid / SG 1.016 / pH 8.0      Gluc Negative / Ketone Trace  / Bili Small / Urobili 6 mg/dL       Blood Moderate / Protein 100 mg/dL / Leuk Est Large / Nitrite Negative      RBC 72 /  / Hyaline 12 / Gran  / Sq Epi  / Non Sq Epi  / Bacteria Few      Iron 51, TIBC 111, %sat 46      [06-06-23 @ 10:40]  Ferritin 1956      [06-06-23 @ 10:40]  Vitamin D (25OH) 70      [06-03-23 @ 06:50]    HBsAb <3.0      [06-01-23 @ 22:50]  HBsAb Nonreact      [06-01-23 @ 22:50]  HBsAg Nonreact      [06-01-23 @ 22:50]  HBcAb Nonreact      [06-01-23 @ 22:50]  HIV Nonreact      [06-08-23 @ 16:00]  HIV Nonreact      [06-08-23 @ 12:20]

## 2023-06-21 NOTE — PROGRESS NOTE ADULT - ASSESSMENT
64 yo M with  PMH of ICH (2021) s/p trach and PEG, HTN, HLD, T2DM, CAD s/p stents, Recurrent C diff, BIBEMS from [facility] for abnormal labs. Patient non-verbal at baseline - limited history. Per NH chart  have a BUN greater than 200 and creatinine of 4.3 on outpatient labs. Outpatient CXR also w/ new L sided pleural effusion. With EMS patient was also found to be in irregular rhythm, no known history of A-fib or a flutter.  MICHELLE on CKD 3a - severe azotemia  likely due to GIB / hypotension  Started HD on 6/1/23  Acute anemia d/t UGIB  Troponemia  Lactic acidosis resolved   DKA resolved   Afib   -  repeat labs today / cr stable   - no HD today   -  continue bumex , document accurate UO   -  feed : blake k noted / lokelma 10 q   12   - repeat h/h / last sat elevated   - please replete ph / need IP > 2   - bladder scan q shift  - very poor prognosis   will follow

## 2023-06-21 NOTE — PROGRESS NOTE ADULT - SUBJECTIVE AND OBJECTIVE BOX
ALICIA BARBOUR 63y Male      Patient is a 63y old  Male who presents with a chief complaint of Abnormal labs (21 Jun 2023 08:45)        INTERVAL HPI/OVERNIGHT EVENTS: No acute events overnight. Patient was seen and evaluated at the bedside. The patient denies pain. Vitals stable. Patient denies fever/chills, chest pain, shortness of breath, abdominal pain, headaches, nausea/vomiting, and + diarrhea      PHYSICAL EXAM:  GENERAL: NAD  HEAD:  Normocephalic  EYES:  conjunctiva and sclera clear  ENMT: Moist mucous membranes  NECK: Supple  NERVOUS SYSTEM:  Alert, awake  CHEST/LUNG: Good air exchange bilaterally, no wheeze, tach in place  HEART: Regular rate and rhythm        Vital Signs Last 24 Hrs  T(C): 37.1 (21 Jun 2023 14:22), Max: 37.1 (21 Jun 2023 14:22)  T(F): 98.8 (21 Jun 2023 14:22), Max: 98.8 (21 Jun 2023 14:22)  HR: 77 (21 Jun 2023 14:22) (77 - 88)  BP: 93/58 (21 Jun 2023 14:22) (93/58 - 141/67)  BP(mean): --  RR: 18 (21 Jun 2023 14:22) (18 - 18)  SpO2: 100% (21 Jun 2023 14:22) (96% - 100%)    Parameters below as of 20 Jun 2023 16:05  Patient On (Oxygen Delivery Method): ventilator    O2 Concentration (%): 40      Consultant(s) Notes Reviewed:  [X] YES  [ ] NO  Care Discussed with Consultants/Other Providers [X] YES  [ ] NO  Imaging Personally Reviewed:  [X] YES  [ ] NO      LABS:                        7.7    10.59 )-----------( 616      ( 20 Jun 2023 08:46 )             25.1     06-20    135  |  100  |  39<H>  ----------------------------<  169<H>  5.1<H>   |  25  |  1.9<H>    Ca    8.0<L>      20 Jun 2023 18:51  Mg     2.2     06-20    TPro  5.6<L>  /  Alb  1.9<L>  /  TBili  0.3  /  DBili  x   /  AST  33  /  ALT  21  /  AlkPhos  275<H>  06-20      Urinalysis Basic - ( 20 Jun 2023 18:51 )    Color: x / Appearance: x / SG: x / pH: x  Gluc: 169 mg/dL / Ketone: x  / Bili: x / Urobili: x   Blood: x / Protein: x / Nitrite: x   Leuk Esterase: x / RBC: x / WBC x   Sq Epi: x / Non Sq Epi: x / Bacteria: x        CAPILLARY BLOOD GLUCOSE      POCT Blood Glucose.: 163 mg/dL (21 Jun 2023 11:37)  POCT Blood Glucose.: 180 mg/dL (21 Jun 2023 07:50)  POCT Blood Glucose.: 203 mg/dL (21 Jun 2023 04:46)  POCT Blood Glucose.: 217 mg/dL (20 Jun 2023 23:48)  POCT Blood Glucose.: 221 mg/dL (20 Jun 2023 21:41)  POCT Blood Glucose.: 165 mg/dL (20 Jun 2023 16:41)

## 2023-06-21 NOTE — PROGRESS NOTE ADULT - ASSESSMENT
62 yo M with  PMH of ICH (2021) s/p trach and PEG, HTN, HLD, T2DM, CAD s/p stents, Recurrent C diff, BIBEMS from [facility] for abnormal labs. Patient non-verbal at baseline - limited history. Per NH chart  have a BUN greater than 200 and creatinine of 4.3 on outpatient labs. Outpatient CXR also w/ new L sided pleural effusion. In ED, patient was hypotensive with Afib with RVR, found to be in acure renal failure, anemic with elevated trops. He was started on Amiodarone GTT, PPI GTT, gastric lavage with coffee ground secretions, started on broad spectrum abx and admitted to MICU  While in MICU, found to be in DKA, s/p insulin drip, MICHELLE started on HD, acute anemia s/p PRBC transfusion, NSTEMI, downgraded to SDU -> floor    Acute Renal Failure, started on HD 6/1  Fluid Overload  HAGMA  - non oliguric, RBUS without hydro   - started on HD 6/1 via R IJ [place]   - Cr much improved s/p HD yesterday, now 2.8  - nephro on board- HD today 6/20  - continue bumex , document accurate UO   - c/w midodrine 10mg q8hr  - c/w sodium bicarb 1300mg PO TID  -  TTE 6/1 - EF 63%, GIDD   - f/u Nephro about TDC    #fever, suspected VAP  #Candiduria with U cx C tropicalis  -  CXR Further significant increase in left-sided opacities/effusion with minimal aeration of the left lung. Right-sided opacities redemonstrated    6/11 BCX NGTD     6/11 UCX   >100,000 CFU/ml Pseudomonas aeruginosa    6/11 sputum   Three or more mixed gram negative rods including    Moderate Pseudomonas aeruginosa (Carbapenem Resistant)    6/7 BCX NGTD   - fu repeat chest xray  - ID consult 6/14: Recommended switching gentamicin to ceftolozane-tazobactam 2.25g IV x1, followed by 450mg IV q8h (HD dosing) for the treatment of pneumonia and UTI due to carbapenem-resistant Pseudomonas aeruginosa x 7 days  - fluconazole, end 6/15    Acute anemia  - HgB 7.9 this AM (baseline 8)   - evaluated by GI - no evidence of GI bleed (PEG tube flushed with return of bile and no blood)  - Monitor H/H closely  - Active T+S  - sp multiple units on this hospitilization    Hyponatremia, likely hypervolemic  - c/w bumex 2mg iv q12hr and HD per nephro  - daily BMP    Afib w/ RVR - now rate controlled   - currently rate controlled s/p amio gtt  - change amio to 200 q24H   - started [id number] mg daily as he went back into afib rvr   - now rate controlled    CAD s/p stents  NSTEMI  - Trops 1.80-->1.63-->1.57-->1.55  - seen by cardio,  c/w asa and statin, medical management     DKA, resolved  - s/p insulin drip, now on basal/bolus and tube feeds. Monitor FS     Hx ICH s/p trach and PEG  Bedbound from NH   - c/w keppra 500mg BID for seizure prophylaxis    DNR only, overall prognosis is poor  - Wife would like to speak to palliative before making decision on CMO    #Unstageable sacral ulcer  - Burn consult    DVT Ppx: HSQ  GI Ppx: Pantoprazole 40mg BID  Diet: Peg tube  Activity: bedrest               62 yo M with  PMH of ICH (2021) s/p trach and PEG, HTN, HLD, T2DM, CAD s/p stents, Recurrent C diff, BIBEMS from [facility] for abnormal labs. Patient non-verbal at baseline - limited history. Per NH chart  have a BUN greater than 200 and creatinine of 4.3 on outpatient labs. Outpatient CXR also w/ new L sided pleural effusion. In ED, patient was hypotensive with Afib with RVR, found to be in acure renal failure, anemic with elevated trops. He was started on Amiodarone GTT, PPI GTT, gastric lavage with coffee ground secretions, started on broad spectrum abx and admitted to MICU  While in MICU, course complicated by DKA, s/p insulin drip, MICHELLE started on HD, acute anemia s/p PRBC transfusion, NSTEMI, downgraded to SDU -> floor    Acute Renal Failure, started on HD 6/1  Fluid Overload  HAGMA  - non oliguric, RBUS without hydro   - started on HD 6/1 via R IJ [place]   - Cr much improved s/p HD yesterday, now 1.9  - Nephro on board- last HD 6/20  - continue bumex , document accurate UO   - c/w midodrine 10mg q8hr  - c/w sodium bicarb 1300mg PO TID  -  TTE 6/1 - EF 63%, GIDD   - 6/21: Discussed with the wife about GOC, possible CMO vs Hospice care, Palliative on board- She will update medical team by Friday after she talks to her Children.    #fever, suspected VAP-improved  #Candiduria with U cx C tropicalis  -  CXR Further significant increase in left-sided opacities/effusion with minimal aeration of the left lung. Right-sided opacities redemonstrated    6/11 BCX NGTD     6/11 UCX   >100,000 CFU/ml Pseudomonas aeruginosa    6/11 sputum   Three or more mixed gram negative rods including    Moderate Pseudomonas aeruginosa (Carbapenem Resistant)    6/7 BCX NGTD   - ID consult 6/14: Recommended switching gentamicin to ceftolozane-tazobactam 2.25g IV x1, followed by 450mg IV q8h (HD dosing) for the treatment of pneumonia and UTI due to carbapenem-resistant Pseudomonas aeruginosa x 7 days - ended   - fluconazole, end 6/15    Acute anemia  - HgB stable (baseline 8)   - evaluated by GI - no evidence of GI bleed (PEG tube flushed with return of bile and no blood)  - Monitor H/H closely  - Active T+S  - sp multiple units on this hospitalization    Hyponatremia, likely hypervolemic-resolved  - c/w bumex 2mg iv q12hr and HD per nephro  - daily BMP    Afib w/ RVR - now rate controlled   - currently rate controlled s/p amio gtt  - changed amio to 200 q24H   - started [id number] mg daily as he went back into afib rvr   - now rate controlled    Acute Decompensated CHF  CAD s/p stents  NSTEMI  - Trops 1.80-->1.63-->1.57-->1.55  - seen by cardio 5/31,  c/w asa and statin, medical management for NSTEMI, CAD, c/w Diuresis    DKA, resolved  - s/p insulin drip, now on basal/bolus and tube feeds. Monitor FS     Hx ICH s/p trach and PEG  Bedbound from NH   - c/w keppra 500mg BID for seizure prophylaxis    DNR only, overall prognosis is poor  - Wife would like to speak to family before     #Unstageable sacral ulcer  - Burn consult    DVT Ppx: HSQ  GI Ppx: Pantoprazole 40mg BID  Diet: Peg tube  Activity: bedrest               64 yo M with  PMH of ICH (2021) s/p trach and PEG, HTN, HLD, T2DM, CAD s/p stents, Recurrent C diff, BIBEMS from [facility] for abnormal labs. Patient non-verbal at baseline - limited history. Per NH chart  have a BUN greater than 200 and creatinine of 4.3 on outpatient labs. Outpatient CXR also w/ new L sided pleural effusion. In ED, patient was hypotensive with Afib with RVR, found to be in acure renal failure, anemic with elevated trops. He was started on Amiodarone GTT, PPI GTT, gastric lavage with coffee ground secretions, started on broad spectrum abx and admitted to MICU  While in MICU, course complicated by DKA, s/p insulin drip, MICHELLE started on HD, acute anemia s/p PRBC transfusion, NSTEMI, downgraded to SDU -> floor    Acute Renal Failure, started on HD 6/1  Fluid Overload  HAGMA  - non oliguric, RBUS without hydro   - started on HD 6/1 via R IJ [place]   - Cr much improved s/p HD yesterday, now 1.9  - Nephro on board- last HD 6/20  - continue bumex , document accurate UO   - c/w midodrine 10mg q8hr  - c/w sodium bicarb 1300mg PO TID  -  TTE 6/1 - EF 63%, GIDD   - 6/21: Discussed with the wife about GOC, possible CMO vs Hospice care, Palliative on board- She will update medical team by Friday after she talks to her Children.    #fever, suspected VAP-improved  #Candiduria with U cx C tropicalis  -  CXR Further significant increase in left-sided opacities/effusion with minimal aeration of the left lung. Right-sided opacities redemonstrated    6/11 BCX NGTD     6/11 UCX   >100,000 CFU/ml Pseudomonas aeruginosa    6/11 sputum   Three or more mixed gram negative rods including    Moderate Pseudomonas aeruginosa (Carbapenem Resistant)    6/7 BCX NGTD   - ID consult 6/14: Recommended switching gentamicin to ceftolozane-tazobactam 2.25g IV x1, followed by 450mg IV q8h (HD dosing) for the treatment of pneumonia and UTI due to carbapenem-resistant Pseudomonas aeruginosa x 7 days - ended   - fluconazole, end 6/15    Acute anemia  - HgB stable (baseline 8)   - evaluated by GI - no evidence of GI bleed (PEG tube flushed with return of bile and no blood)  - Monitor H/H closely  - Active T+S  - sp multiple units on this hospitalization    Hyponatremia, likely hypervolemic-resolved  - c/w bumex 2mg iv q12hr and HD per nephro  - daily BMP    Afib w/ RVR - now rate controlled   - currently rate controlled s/p amio gtt  - changed amio to 200 q24H   - started [id number] mg daily as he went back into afib rvr   - now rate controlled    Acute Decompensated CHF  CAD s/p stents  NSTEMI  - Trops 1.80-->1.63-->1.57-->1.55  - seen by cardio 5/31,  c/w asa and statin, medical management for NSTEMI, CAD, c/w Diuresis    DKA, resolved  - s/p insulin drip, now on basal/bolus and tube feeds. Monitor FS     Hx ICH s/p trach and PEG  Bedbound from NH   - c/w keppra 500mg BID for seizure prophylaxis    #Unstageable sacral ulcer  - Burn recs in    DVT Ppx: HSQ  GI Ppx: Pantoprazole 40mg BID  Diet: Peg tube  Activity: bedrest

## 2023-06-21 NOTE — PROGRESS NOTE ADULT - ASSESSMENT
64 yo M with  PMH of ICH (2021) s/p trach and PEG, HTN, HLD, T2DM, CAD s/p stents, Recurrent C diff, BIBEMS from [facility] for abnormal labs. Patient non-verbal at baseline - limited history. Per NH chart  have a BUN greater than 200 and creatinine of 4.3 on outpatient labs. Outpatient CXR also w/ new L sided pleural effusion. In ED, patient was hypotensive with Afib with RVR, found to be in acure renal failure, anemic with elevated trops. He was started on Amiodarone GTT, PPI GTT, gastric lavage with coffee ground secretions, started on broad spectrum abx and admitted to MICU  While in MICU, course complicated by DKA, s/p insulin drip, MICHELLE started on HD, acute anemia s/p PRBC transfusion, NSTEMI, downgraded to SDU -> floor    Acute Renal Failure, started on HD 6/1  Fluid Overload  HAGMA  - non oliguric, RBUS without hydro   - started on HD 6/1 via R IJ [place]   - Cr much improved s/p HD yesterday, now 1.9  - Nephro on board- last HD 6/20  - continue bumex , document accurate UO   - c/w midodrine 10mg q8hr  - c/w sodium bicarb 1300mg PO TID  -  TTE 6/1 - EF 63%, GIDD   - 6/21: Discussed with the wife about GOC, possible CMO vs Hospice care, Palliative on board- She will update medical team by Friday after she talks to her Children.    #fever, suspected VAP-improved  #Candiduria with U cx C tropicalis  -  CXR Further significant increase in left-sided opacities/effusion with minimal aeration of the left lung. Right-sided opacities redemonstrated    6/11 BCX NGTD     6/11 UCX   >100,000 CFU/ml Pseudomonas aeruginosa    6/11 sputum   Three or more mixed gram negative rods including    Moderate Pseudomonas aeruginosa (Carbapenem Resistant)    6/7 BCX NGTD   - ID consult 6/14: Recommended switching gentamicin to ceftolozane-tazobactam 2.25g IV x1, followed by 450mg IV q8h (HD dosing) for the treatment of pneumonia and UTI due to carbapenem-resistant Pseudomonas aeruginosa x 7 days - ended   - fluconazole, end 6/15    Acute anemia  - HgB stable (baseline 8)   - evaluated by GI - no evidence of GI bleed (PEG tube flushed with return of bile and no blood)  - Monitor H/H closely  - Active T+S  - sp multiple units on this hospitalization    Hyponatremia, likely hypervolemic-resolved  - c/w bumex 2mg iv q12hr and HD per nephro  - daily BMP    Afib w/ RVR - now rate controlled   - currently rate controlled s/p amio gtt  - changed amio to 200 q24H   - started [id number] mg daily as he went back into afib rvr   - now rate controlled    Acute Decompensated CHF  CAD s/p stents  NSTEMI  - Trops 1.80-->1.63-->1.57-->1.55  - seen by cardio 5/31,  c/w asa and statin, medical management for NSTEMI, CAD, c/w Diuresis    DKA, resolved  - s/p insulin drip, now on basal/bolus and tube feeds. Monitor FS     Hx ICH s/p trach and PEG  Bedbound from NH   - c/w keppra 500mg BID for seizure prophylaxis    #Unstageable sacral ulcer  - Burn recs in    DVT Ppx: HSQ  GI Ppx: Pantoprazole 40mg BID  Diet: Peg tube  Activity: bedrest    Overall Prognosis poor.  Pending : GOC with family/ Palliative  re: MATHEW

## 2023-06-21 NOTE — PROGRESS NOTE ADULT - SUBJECTIVE AND OBJECTIVE BOX
Nephrology progress note    THIS IS AN INCOMPLETE NOTE . FULL NOTE TO FOLLOW SHORTLY    Patient is seen and examined, events over the last 24 h noted .    Allergies:  penicillin (Other)    Hospital Medications:   MEDICATIONS  (STANDING):  acetylcysteine 20%  Inhalation 4 milliLiter(s) Inhalation every 6 hours  albuterol/ipratropium for Nebulization 3 milliLiter(s) Nebulizer every 6 hours  aMIOdarone    Tablet   Oral   aMIOdarone    Tablet 200 milliGRAM(s) Oral daily  aspirin  chewable 81 milliGRAM(s) Oral daily  atorvastatin 80 milliGRAM(s) Oral at bedtime  buMETAnide Injectable 2 milliGRAM(s) IV Push every 12 hours  ceftolozane/tazobactam IVPB 450 milliGRAM(s) IV Intermittent every 8 hours  chlorhexidine 0.12% Liquid 15 milliLiter(s) Oral Mucosa two times a day  chlorhexidine 2% Cloths 1 Application(s) Topical <User Schedule>  collagenase Ointment 1 Application(s) Topical two times a day  dextrose 5%. 1000 milliLiter(s) (100 mL/Hr) IV Continuous <Continuous>  dextrose 5%. 1000 milliLiter(s) (50 mL/Hr) IV Continuous <Continuous>  dextrose 50% Injectable 25 Gram(s) IV Push once  dextrose 50% Injectable 25 Gram(s) IV Push once  dextrose 50% Injectable 12.5 Gram(s) IV Push once  diltiazem    milliGRAM(s) Oral daily  glucagon  Injectable 1 milliGRAM(s) IntraMuscular once  heparin   Injectable 5000 Unit(s) SubCutaneous every 12 hours  insulin glargine Injectable (LANTUS) 10 Unit(s) SubCutaneous at bedtime  insulin lispro (ADMELOG) corrective regimen sliding scale   SubCutaneous three times a day before meals  levETIRAcetam  Solution 500 milliGRAM(s) Oral two times a day  pantoprazole  Injectable 40 milliGRAM(s) IV Push every 12 hours  silver sulfADIAZINE 1% Cream 1 Application(s) Topical every 12 hours  sodium zirconium cyclosilicate 10 Gram(s) Oral every 12 hours        VITALS:  T(F): 97.8 (06-21-23 @ 05:00), Max: 98.1 (06-20-23 @ 20:07)  HR: 83 (06-21-23 @ 05:00)  BP: 110/56 (06-21-23 @ 05:00)  RR: 18 (06-21-23 @ 05:00)  SpO2: 100% (06-21-23 @ 05:00)  Wt(kg): --    06-19 @ 07:01  -  06-20 @ 07:00  --------------------------------------------------------  IN: 435 mL / OUT: 0 mL / NET: 435 mL    06-20 @ 07:01  -  06-21 @ 07:00  --------------------------------------------------------  IN: 870 mL / OUT: 1000 mL / NET: -130 mL          PHYSICAL EXAM:  Constitutional: NAD  HEENT: anicteric sclera, oropharynx clear, MMM  Neck: No JVD  Respiratory: CTAB, no wheezes, rales or rhonchi  Cardiovascular: S1, S2, RRR  Gastrointestinal: BS+, soft, NT/ND  Extremities: No cyanosis or clubbing. No peripheral edema  :  No teixeira.   Skin: No rashes    LABS:  06-20    135  |  100  |  39<H>  ----------------------------<  169<H>  5.1<H>   |  25  |  1.9<H>    Ca    8.0<L>      20 Jun 2023 18:51  Phos  0.8     06-19  Mg     2.2     06-20    TPro  5.6<L>  /  Alb  1.9<L>  /  TBili  0.3  /  DBili      /  AST  33  /  ALT  21  /  AlkPhos  275<H>  06-20                          7.7    10.59 )-----------( 616      ( 20 Jun 2023 08:46 )             25.1       Urine Studies:  Urinalysis Basic - ( 20 Jun 2023 18:51 )    Color:  / Appearance:  / SG:  / pH:   Gluc: 169 mg/dL / Ketone:   / Bili:  / Urobili:    Blood:  / Protein:  / Nitrite:    Leuk Esterase:  / RBC:  / WBC    Sq Epi:  / Non Sq Epi:  / Bacteria:           Iron 51, TIBC 111, %sat 46      [06-06-23 @ 10:40]  Ferritin 1956      [06-06-23 @ 10:40]  Vitamin D (25OH) 70      [06-03-23 @ 06:50]    HBsAb <3.0      [06-01-23 @ 22:50]  HBsAb Nonreact      [06-01-23 @ 22:50]  HBsAg Nonreact      [06-01-23 @ 22:50]  HBcAb Nonreact      [06-01-23 @ 22:50]  HCV 0.09, Nonreact      [11-05-21 @ 09:04]  HIV Nonreact      [06-08-23 @ 16:00]  HIV Nonreact      [06-08-23 @ 12:20]        RADIOLOGY & ADDITIONAL STUDIES:   Nephrology progress note    Patient is seen and examined, events over the last 24 h noted .  Lying in bed trached     Allergies:  penicillin (Other)    Hospital Medications:   MEDICATIONS  (STANDING):  acetylcysteine 20%  Inhalation 4 milliLiter(s) Inhalation every 6 hours  albuterol/ipratropium for Nebulization 3 milliLiter(s) Nebulizer every 6 hours  aMIOdarone    Tablet 200 milliGRAM(s) Oral daily  aspirin  chewable 81 milliGRAM(s) Oral daily  atorvastatin 80 milliGRAM(s) Oral at bedtime  buMETAnide Injectable 2 milliGRAM(s) IV Push every 12 hours  ceftolozane/tazobactam IVPB 450 milliGRAM(s) IV Intermittent every 8 hours  collagenase Ointment 1 Application(s) Topical two times a day  diltiazem    milliGRAM(s) Oral daily  glucagon  Injectable 1 milliGRAM(s) IntraMuscular once  heparin   Injectable 5000 Unit(s) SubCutaneous every 12 hours  insulin glargine Injectable (LANTUS) 10 Unit(s) SubCutaneous at bedtime  insulin lispro (ADMELOG) corrective regimen sliding scale   SubCutaneous three times a day before meals  levETIRAcetam  Solution 500 milliGRAM(s) Oral two times a day  pantoprazole  Injectable 40 milliGRAM(s) IV Push every 12 hours  silver sulfADIAZINE 1% Cream 1 Application(s) Topical every 12 hours  sodium zirconium cyclosilicate 10 Gram(s) Oral every 12 hours        VITALS:  T(F): 97.8 (06-21-23 @ 05:00), Max: 98.1 (06-20-23 @ 20:07)  HR: 83 (06-21-23 @ 05:00)  BP: 110/56 (06-21-23 @ 05:00)  RR: 18 (06-21-23 @ 05:00)  SpO2: 100% (06-21-23 @ 05:00)      06-19 @ 07:01  -  06-20 @ 07:00  --------------------------------------------------------  IN: 435 mL / OUT: 0 mL / NET: 435 mL    06-20 @ 07:01  -  06-21 @ 07:00  --------------------------------------------------------  IN: 870 mL / OUT: 1000 mL / NET: -130 mL          PHYSICAL EXAM:  Constitutional: NAD  Respiratory: trached / no crackles   Cardiovascular: S1, S2, RRR  Gastrointestinal: BS+, soft, NT/ND  Extremities: No cyanosis or clubbing. No peripheral edema  :  No teixeira.   Skin: No rashes    LABS:  06-20    135  |  100  |  39<H>  ----------------------------<  169<H>  5.1<H>   |  25  |  1.9<H>    Ca    8.0<L>      20 Jun 2023 18:51  Phos  0.8     06-19  Mg     2.2     06-20    TPro  5.6<L>  /  Alb  1.9<L>  /  TBili  0.3  /  DBili      /  AST  33  /  ALT  21  /  AlkPhos  275<H>  06-20                          7.7    10.59 )-----------( 616      ( 20 Jun 2023 08:46 )             25.1       Urine Studies:  Urinalysis Basic - ( 20 Jun 2023 18:51 )    Color:  / Appearance:  / SG:  / pH:   Gluc: 169 mg/dL / Ketone:   / Bili:  / Urobili:    Blood:  / Protein:  / Nitrite:    Leuk Esterase:  / RBC:  / WBC    Sq Epi:  / Non Sq Epi:  / Bacteria:           Iron 51, TIBC 111, %sat 46      [06-06-23 @ 10:40]  Ferritin 1956      [06-06-23 @ 10:40]  Vitamin D (25OH) 70      [06-03-23 @ 06:50]    HBsAb <3.0      [06-01-23 @ 22:50]  HBsAb Nonreact      [06-01-23 @ 22:50]  HBsAg Nonreact      [06-01-23 @ 22:50]  HBcAb Nonreact      [06-01-23 @ 22:50]  HCV 0.09, Nonreact      [11-05-21 @ 09:04]  HIV Nonreact      [06-08-23 @ 16:00]  HIV Nonreact      [06-08-23 @ 12:20]        RADIOLOGY & ADDITIONAL STUDIES:

## 2023-06-21 NOTE — PROGRESS NOTE ADULT - SUBJECTIVE AND OBJECTIVE BOX
ALICIA BARBOUR  63y  Male      Patient is a 63y old  Male who presents with a chief complaint of Abnormal labs       INTERVAL HPI/OVERNIGHT EVENTS:      ******************************* REVIEW OF SYSTEMS:**********************************************    All other review of systems negative    *********************** VITALS ******************************************    T(F): 98.8 (06-21-23 @ 14:22)  HR: 77 (06-21-23 @ 14:22) (77 - 84)  BP: 93/58 (06-21-23 @ 14:22) (93/58 - 138/68)  RR: 18 (06-21-23 @ 14:22) (18 - 18)  SpO2: 100% (06-21-23 @ 14:22) (96% - 100%)    06-20-23 @ 07:01  -  06-21-23 @ 07:00  --------------------------------------------------------  IN: 870 mL / OUT: 1000 mL / NET: -130 mL            06-20-23 @ 07:01  -  06-21-23 @ 07:00  --------------------------------------------------------  IN: 870 mL / OUT: 1000 mL / NET: -130 mL        ******************************** PHYSICAL EXAM:**************************************************  GENERAL: NAD    PSYCH: no agitation, baseline mentation  HEENT: vented thru trach     NERVOUS SYSTEM:  Alert & awake x2    PULMONARY: MICHAEL, CTA    CARDIOVASCULAR: S1S2 RRR    GI: Soft, NT, ND; BS present. PEG in place     EXTREMITIES:  2+ Peripheral Pulses, No clubbing, cyanosis, or edema    LYMPH: No lymphadenopathy noted    SKIN: sacral decub , B/L Heel DTI       **************************** LABS *******************************************************                          7.7    10.59 )-----------( 616      ( 20 Jun 2023 08:46 )             25.1     06-20    135  |  100  |  39<H>  ----------------------------<  169<H>  5.1<H>   |  25  |  1.9<H>    Ca    8.0<L>      20 Jun 2023 18:51  Mg     2.2     06-20    TPro  5.6<L>  /  Alb  1.9<L>  /  TBili  0.3  /  DBili  x   /  AST  33  /  ALT  21  /  AlkPhos  275<H>  06-20      Urinalysis Basic - ( 20 Jun 2023 18:51 )    Color: x / Appearance: x / SG: x / pH: x  Gluc: 169 mg/dL / Ketone: x  / Bili: x / Urobili: x   Blood: x / Protein: x / Nitrite: x   Leuk Esterase: x / RBC: x / WBC x   Sq Epi: x / Non Sq Epi: x / Bacteria: x        Lactate Trend        CAPILLARY BLOOD GLUCOSE      POCT Blood Glucose.: 114 mg/dL (21 Jun 2023 16:40)          **************************Active Medications *******************************************  penicillin (Other)      acetaminophen     Tablet .. 650 milliGRAM(s) Oral every 6 hours PRN  acetylcysteine 20%  Inhalation 4 milliLiter(s) Inhalation every 6 hours  albuterol/ipratropium for Nebulization 3 milliLiter(s) Nebulizer every 6 hours  aMIOdarone    Tablet 200 milliGRAM(s) Oral daily  aMIOdarone    Tablet   Oral   aspirin  chewable 81 milliGRAM(s) Oral daily  atorvastatin 80 milliGRAM(s) Oral at bedtime  buMETAnide Injectable 2 milliGRAM(s) IV Push every 12 hours  ceftolozane/tazobactam IVPB 450 milliGRAM(s) IV Intermittent every 8 hours  chlorhexidine 0.12% Liquid 15 milliLiter(s) Oral Mucosa two times a day  chlorhexidine 2% Cloths 1 Application(s) Topical <User Schedule>  collagenase Ointment 1 Application(s) Topical two times a day  dextrose 5%. 1000 milliLiter(s) IV Continuous <Continuous>  dextrose 5%. 1000 milliLiter(s) IV Continuous <Continuous>  dextrose 50% Injectable 25 Gram(s) IV Push once  dextrose 50% Injectable 25 Gram(s) IV Push once  dextrose 50% Injectable 12.5 Gram(s) IV Push once  dextrose Oral Gel 15 Gram(s) Oral once PRN  diltiazem    milliGRAM(s) Oral daily  glucagon  Injectable 1 milliGRAM(s) IntraMuscular once  heparin   Injectable 5000 Unit(s) SubCutaneous every 12 hours  insulin glargine Injectable (LANTUS) 10 Unit(s) SubCutaneous at bedtime  insulin lispro (ADMELOG) corrective regimen sliding scale   SubCutaneous three times a day before meals  levETIRAcetam  Solution 500 milliGRAM(s) Oral two times a day  midodrine. 2.5 milliGRAM(s) Oral three times a day PRN  pantoprazole  Injectable 40 milliGRAM(s) IV Push every 12 hours  silver sulfADIAZINE 1% Cream 1 Application(s) Topical every 12 hours  sodium chloride 0.9% Bolus. 100 milliLiter(s) IV Bolus every 5 minutes PRN  sodium chloride 0.9% Bolus. 100 milliLiter(s) IV Bolus every 5 minutes PRN  sodium zirconium cyclosilicate 10 Gram(s) Oral every 12 hours      ***************************************************  RADIOLOGY & ADDITIONAL TESTS:    Imaging Personally Reviewed:  [ ] YES  [ ] NO    HEALTH ISSUES - PROBLEM Dx:  Sepsis    Acute UTI    History of intracranial hemorrhage    Palliative care by specialist    Anemia    MICHELLE (acute kidney injury)    Pain
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
## 2023-06-22 NOTE — CHART NOTE - NSCHARTNOTEFT_GEN_A_CORE
Contacted wife 6/21- would like to discuss CMO vs Hospice with Palliative team, Palliative team contacted and updated, Palliative meeting scheduled at 1 pm tomorrow Friday June 23rd.

## 2023-06-22 NOTE — PROGRESS NOTE ADULT - SUBJECTIVE AND OBJECTIVE BOX
HPI:  64 yo M with  PMH of ICH (2021) s/p trach and PEG, HTN, HLD, T2DM, CAD s/p stents, Recurrent C diff, BIBEMS from [facility] for abnormal labs. Patient non-verbal at baseline - limited history. Per NH chart  have a BUN greater than 200 and creatinine of 4.3 on outpatient labs. Outpatient CXR also w/ new L sided pleural effusion. With EMS patient was also found to be in irregular rhythm, no known history of A-fib or a flutter.  In ED patient had borderline BP, and was in Atrial fibrillation. He was started on amiodarone gtt. Labs showed , Cr. 4.1, Hb 6.6. Trops 1.8. He was given 1U PRBC, gastric lavage revealed coffee ground emesis w/no active bleeding. He was started on PPI drip and GI consulted in ED. He was given IV aztreonam and vancomycin.   Patient being admitted to ICU for management of Sepsis likely from UTI, MICHELLE likely prerenal and NSTEMI.        (30 May 2023 23:39)      Interval History  -family meeting planned for Friday 6/23/23 to discuss CMO/Hospice    ADVANCE DIRECTIVES:    [ ] Full Code [x ] DNR [ ] MOLST [ ] Living Will     DECISION MAKER(s):  [ ] Health Care Proxy(s)  [ ] Surrogate(s)  [ ] Guardian           Name(s): Phone Number(s): Patricio Camacho [phone]    BASELINE (I)ADL(s) (prior to admission):  [place]: [ ]Total  [ ] Moderate [ ]Dependent  Palliative Performance Status Version 2:         %    http://npcrc.org/files/news/palliative_performance_scale_ppsv2.pdf    Allergies    penicillin (Other)    Intolerances    MEDICATIONS  (STANDING):  acetylcysteine 20%  Inhalation 4 milliLiter(s) Inhalation every 6 hours  albuterol/ipratropium for Nebulization 3 milliLiter(s) Nebulizer every 6 hours  aMIOdarone    Tablet   Oral   aMIOdarone    Tablet 200 milliGRAM(s) Oral daily  aspirin  chewable 81 milliGRAM(s) Oral daily  atorvastatin 80 milliGRAM(s) Oral at bedtime  buMETAnide Injectable 2 milliGRAM(s) IV Push every 12 hours  chlorhexidine 0.12% Liquid 15 milliLiter(s) Oral Mucosa two times a day  chlorhexidine 2% Cloths 1 Application(s) Topical <User Schedule>  collagenase Ointment 1 Application(s) Topical two times a day  dextrose 5%. 1000 milliLiter(s) (100 mL/Hr) IV Continuous <Continuous>  dextrose 5%. 1000 milliLiter(s) (50 mL/Hr) IV Continuous <Continuous>  dextrose 50% Injectable 25 Gram(s) IV Push once  dextrose 50% Injectable 25 Gram(s) IV Push once  dextrose 50% Injectable 12.5 Gram(s) IV Push once  diltiazem    milliGRAM(s) Oral daily  glucagon  Injectable 1 milliGRAM(s) IntraMuscular once  heparin   Injectable 5000 Unit(s) SubCutaneous every 12 hours  insulin glargine Injectable (LANTUS) 10 Unit(s) SubCutaneous at bedtime  insulin lispro (ADMELOG) corrective regimen sliding scale   SubCutaneous three times a day before meals  levETIRAcetam  Solution 500 milliGRAM(s) Oral two times a day  pantoprazole  Injectable 40 milliGRAM(s) IV Push every 12 hours  silver sulfADIAZINE 1% Cream 1 Application(s) Topical every 12 hours  sodium zirconium cyclosilicate 10 Gram(s) Oral every 12 hours    MEDICATIONS  (PRN):  acetaminophen     Tablet .. 650 milliGRAM(s) Oral every 6 hours PRN Temp greater or equal to 38C (100.4F), Mild Pain (1 - 3)  dextrose Oral Gel 15 Gram(s) Oral once PRN Blood Glucose LESS THAN 70 milliGRAM(s)/deciliter  midodrine. 2.5 milliGRAM(s) Oral three times a day PRN SBP < 110  sodium chloride 0.9% Bolus. 100 milliLiter(s) IV Bolus every 5 minutes PRN SBP LESS THAN or EQUAL to 100 mmHg  sodium chloride 0.9% Bolus. 100 milliLiter(s) IV Bolus every 5 minutes PRN SBP LESS THAN or EQUAL to 80 mmHg    PRESENT SYMPTOMS: [x ]Unable to obtain due to poor mentation   Source if other than patient:  [ ]Family   [ ]Team     Pain: [ ]yes [ ]no  QOL impact -   Location -                    Aggravating factors -  Quality -  Radiation -  Timing-  Severity (0-10 scale):  Minimal acceptable level (0-10 scale):     CPOT:  0  https://www.sccm.org/getattachment/ojt29q47-2w1r-9w4v-9k8e-0704l3347r6m/Critical-Care-Pain-Observation-Tool-(CPOT)    PAIN AD Score:   http://[email or web address]/painad.pdf (press ctrl +  left click to view)    Dyspnea:                           [ ]None[ ]Mild [ ]Moderate [ ]Severe     Respiratory Distress Observation Scale (RDOS): 1  A score of 0 to 2 signifies little or no respiratory distress, 3 signifies mild distress, scores 4 to 6 indicate moderate distress, and scores greater than 7 signify severe distress  [email or web address]-[email or web address]-[email or web address]    Anxiety:                             [ ]None[ ]Mild [ ]Moderate [ ]Severe   Fatigue:                             [ ]None[ ]Mild [ ]Moderate [ ]Severe   Nausea:                             [ ]None[ ]Mild [ ]Moderate [ ]Severe   Loss of appetite:              [ ]None[ ]Mild [ ]Moderate [ ]Severe   Constipation:                    [ ]None[ ]Mild [ ]Moderate [ ]Severe    PCSSQ [Palliative Care Spiritual Screening Question]    Severity (0-10):    Score of 4 or greater indicates consideration of Chaplaincy referral    Chaplaincy Referral: [ ] Yes [ ] Refused [ ] Following         Caregiver [place]:  [ ] Yes [ ] No [ ] Deferred    Social Work Referral [ ]    Patient and Family Centered Care Referral [ ]         Anticipatory Grief Present: [ ] Yes [ ] No [ ] Deferred    Social Work Referral [ ]    Patient and Family Centered Care Referral [ ]    Other Symptoms:  [ ]All other review of systems negative     Palliative Performance Status Version 2:         %    http://npcrc.org/files/news/palliative_performance_scale_ppsv2.pdf  PHYSICAL EXAM:  Vital Signs Last 24 Hrs  T(C): 36.6 (22 Jun 2023 05:00), Max: 37.1 (21 Jun 2023 14:22)  T(F): 97.8 (22 Jun 2023 05:00), Max: 98.8 (21 Jun 2023 14:22)  HR: 83 (22 Jun 2023 05:00) (76 - 84)  BP: 115/54 (22 Jun 2023 05:00) (93/58 - 122/57)  BP(mean): --  RR: 18 (22 Jun 2023 05:00) (18 - 18)  SpO2: 100% (22 Jun 2023 05:00) (98% - 100%)     I&O's Summary    21 Jun 2023 07:01  -  22 Jun 2023 07:00  --------------------------------------------------------  IN: 870 mL / OUT: 0 mL / NET: 870 mL        GENERAL:  [ ] No acute distress [ x]Lethargic  [ ]Unarousable  [ ]Verbal  [ ]Non-Verbal [ ]Cachexia    BEHAVIORAL/PSYCH:  [ ]Alert and Oriented x  [ ] Anxiety [ ] Delirium [ ] Agitation [x ] Calm   EYES: [ ] No scleral icterus [ ] Scleral icterus [ x] Closed  ENMT:  [ ]Dry mouth  [x ]No external oral lesions [ ] No external ear or nose lesions  CARDIOVASCULAR:  [ ]Regular [ ]Irregular [ ]Tachy [x ]Not Tachy  [ ]Bridger [ ] Edema [ ] No edema  PULMONARY:  [ ]Tachypnea  [ ]Audible excessive secretions [ ] No labored breathing [ x] mildly labored breathing [ ] labored breathing  GASTROINTESTINAL: [ ]Soft  [ ]Distended  [x ]Not distended [ ]Non tender [ ]Tender  MUSCULOSKELETAL: [ ]No clubbing [ ] clubbing  [x ] No cyanosis [ ] cyanosis  NEUROLOGIC: [ ]No focal deficits  [ ]Follows commands  [x ]Does not follow commands  [x ]Cognitive impairment  [ ]Dysphagia  [ ]Dysarthria  [ ]Paresis   SKIN: [ ] Jaundiced [ ] Non-jaundiced [ ]Rash [x ]No Rash [ ] Warm [ ] Dry  MISC/LINES: [ ] ET tube [x ] Trach [ ]NGT/OGT [ ]PEG [ ]Barney    CRITICAL CARE:  [ ] Shock Present  [ ]Septic [ ]Cardiogenic [ ]Neurologic [ ]Hypovolemic  [ ]  Vasopressors [ ]  Inotropes   [ ]Respiratory failure present [x ]Mechanical ventilation [ ]Non-invasive ventilatory support [ ]High flow  [ ]Acute  [ ]Chronic [ ]Hypoxic  [ ]Hypercarbic [ ]Other  [ ]Other organ failure     LABS: reviewed by me                        7.0    11.90 )-----------( 644      ( 22 Jun 2023 07:39 )             22.7   06-22    135  |  97<L>  |  52<H>  ----------------------------<  164<H>  4.8   |  26  |  2.3<H>    Ca    8.1<L>      22 Jun 2023 07:39  Phos  1.8     06-22  Mg     2.1     06-22    TPro  5.2<L>  /  Alb  1.9<L>  /  TBili  0.3  /  DBili  x   /  AST  29  /  ALT  22  /  AlkPhos  251<H>  06-22      Urinalysis Basic - ( 22 Jun 2023 07:39 )    Color: x / Appearance: x / SG: x / pH: x  Gluc: 164 mg/dL / Ketone: x  / Bili: x / Urobili: x   Blood: x / Protein: x / Nitrite: x   Leuk Esterase: x / RBC: x / WBC x   Sq Epi: x / Non Sq Epi: x / Bacteria: x      RADIOLOGY & ADDITIONAL STUDIES: reviewed by me    < from: CT Chest No Cont (06.15.23 @ 16:09) >    Interval development bilateral large pleural effusions with persistent   lower lobe consolidations.    Intralobular septal thickening in the well aerated portions of the upper   lobes are noted. No pneumothorax or parenchymal mass.    --- End of Report ---    < end of copied text >        EKG: reviewed by me    < from: 12 Lead ECG (06.12.23 @ 13:30) >    Ventricular Rate 103 BPM    Atrial Rate 103 BPM    P-R Interval 120 ms    QRS Duration 64 ms    Q-T Interval 322 ms    QTC Calculation(Bazett) 421 ms    P Axis 47 degrees    R Axis 23 degrees    T Axis 26 degrees    Diagnosis Line Sinus tachycardia  Low voltage QRS  Borderline ECG    < end of copied text >          PROTEIN CALORIE MALNUTRITION PRESENT: [ ]mild [ ]moderate [ ]severe [ ]underweight [ ]morbid obesity  https://www.andeal.org/vault/3020/web/files/ONC/Table_Clinical%20Characteristics%20to%20Document%20Malnutrition-White%20JV%20et%20al%202012.pdf    Height (cm): 170.2 (06-01-23 @ 09:07), 180.3 (10-04-22 @ 22:39)  Weight (kg): 93.3 (06-05-23 @ 17:10), 66.4 (10-04-22 @ 22:39)  BMI (kg/m2): 32.2 (06-05-23 @ 17:10), 22.9 (06-01-23 @ 09:07), 20.4 (10-04-22 @ 22:39)    [ ]PPSV2 < or = to 30% [ ]significant weight loss  [ ]poor nutritional intake  [ ]anasarca      [ ]Artificial Nutrition      Patient and/or family assessed for spiritual and social needs     REFERRALS:   [ ]Chaplaincy  [ ]Hospice  [ ]Child Life  [x ]Social Work  [ ]Case management [ ]Holistic Therapy     Patient discussed with primary medical team MD  Palliative care education provided to patient and/or family    Goals of Care Document:

## 2023-06-22 NOTE — PROGRESS NOTE ADULT - ASSESSMENT
64 yo M with  PMH of ICH (2021) s/p trach and PEG, HTN, HLD, T2DM, CAD s/p stents, Recurrent C diff, BIBEMS from [facility] for abnormal labs. Patient non-verbal at baseline - limited history. Per NH chart  have a BUN greater than 200 and creatinine of 4.3 on outpatient labs. Outpatient CXR also w/ new L sided pleural effusion. In ED, patient was hypotensive with Afib with RVR, found to be in acure renal failure, anemic with elevated trops. He was started on Amiodarone GTT, PPI GTT, gastric lavage with coffee ground secretions, started on broad spectrum abx and admitted to MICU  While in MICU, course complicated by DKA, s/p insulin drip, MICHELLE started on HD, acute anemia s/p PRBC transfusion, NSTEMI, downgraded to SDU -> floor    Acute Renal Failure, started on HD 6/1  Fluid Overload  HAGMA  - non oliguric, RBUS without hydro   - started on HD 6/1 via R [facility] [place]   - Cr much improved s/p HD yesterday, now 1.9  - Nephro on board- last HD 6/20  - continue bumex , document accurate UO   - c/w midodrine 10mg q8hr  - c/w sodium bicarb 1300mg PO TID  -  TTE 6/1 - EF 63%, GIDD     #fever, suspected VAP-improved  #Candiduria with U cx C tropicalis  -  CXR Further significant increase in left-sided opacities/effusion with minimal aeration of the left lung. Right-sided opacities redemonstrated    6/11 BCX NGTD     6/11 UCX   >100,000 CFU/ml Pseudomonas aeruginosa    6/11 sputum   Three or more mixed gram negative rods including    Moderate Pseudomonas aeruginosa (Carbapenem Resistant)    6/7 BCX NGTD   - ID consult 6/14: Recommended switching gentamicin to ceftolozane-tazobactam 2.25g IV x1, followed by 450mg IV q8h (HD dosing) for the treatment of pneumonia and UTI due to carbapenem-resistant Pseudomonas aeruginosa x 7 days - ended   - fluconazole, end 6/15    Acute anemia  - HgB stable (baseline 8)   - evaluated by GI - no evidence of GI bleed (PEG tube flushed with return of bile and no blood)  - Monitor H/H closely  - Active T+S  - sp multiple units on this hospitalization    Hyponatremia, likely hypervolemic-resolved  - c/w bumex 2mg iv q12hr and HD per nephro  - daily BMP    Afib w/ RVR - now rate controlled   - currently rate controlled s/p amio gtt  - changed amio to 200 q24H   - started [id number] mg daily as he went back into afib rvr   - now rate controlled    Acute Decompensated CHF  CAD s/p stents  NSTEMI  - Trops 1.80-->1.63-->1.57-->1.55  - seen by cardio 5/31,  c/w asa and statin, medical management for NSTEMI, CAD, c/w Diuresis    DKA, resolved  - s/p insulin drip, now on basal/bolus and tube feeds. Monitor FS     Hx ICH s/p trach and PEG  Bedbound from NH   - c/w keppra 500mg BID for seizure prophylaxis    #Unstageable sacral ulcer  - Burn recs in    DVT Ppx: HSQ  GI Ppx: Pantoprazole 40mg BID  Diet: Peg tube  Activity: bedrest    Overall Prognosis poor.  Pending : GOC with family/ Palliative  re: CMO on Friday @1:00 pm

## 2023-06-22 NOTE — PROGRESS NOTE ADULT - ASSESSMENT
62 yo M with  PMH of ICH (2021) s/p trach and PEG, HTN, HLD, T2DM, CAD s/p stents, Recurrent C diff, BIBEMS from [facility] for abnormal labs. Patient non-verbal at baseline - limited history. Per NH chart  have a BUN greater than 200 and creatinine of 4.3 on outpatient labs. Outpatient CXR also w/ new L sided pleural effusion. With EMS patient was also found to be in irregular rhythm, no known history of A-fib or a flutter. Palliative consulted for [facility] [place]    Patient seen at bedside. Patient did not pass breathing trial off vent as per RN and was placed back on ventilator. Patient did not appear uncomfortable and was only slightly dyspneic since he was recently placed back on the ventilator. Family meeting planned for tomorrow at 1pm with wife to discuss CMO/Hospice.    Will continue to follow.

## 2023-06-22 NOTE — PROGRESS NOTE ADULT - ASSESSMENT
IMPRESSION:    Sepsis present on admission  Candida UTI  Pseudomonas pna  left side atelectasis improved  Bilateral pleural effusions likely volume overload   MICHELLE on CKD III on dialysis   NSTEMI likely type 2  Hyponatremia resolved   Paroxysmal Afib  Acute on chronic anemia suspected UGI Bleed  Chronic respiratory failure  H/o ICH s/p trach and PEG  H/o CAD      PLAN:    CNS: Avoid CNS depressants     HEENT: Oral and trach care    PULMONARY: HOB 45. Aspiration.  No vent changes.  Goal saturation 92-96%. Vent dependents.  Pulmonary toilet. repeat CXR     CARDIOVASCULAR: Avoid overload.  Continue rate control.  Continue Volume contraction as tolerated.      GI:  Feeding  Bowel regimen     RENAL: trend CMP.  Correct as needed.  Nephrology following.  HD  per renal     INFECTIOUS DISEASE: ABX per ID.  Monitor VS     HEMATOLOGICAL: DVT prophylaxis.  LE duplex negative.  Monitor CBC.        ENDOCRINE: Follow up FS. Insulin protocol if needed.    DNR    Poor prognosis

## 2023-06-22 NOTE — PROGRESS NOTE ADULT - SUBJECTIVE AND OBJECTIVE BOX
Nephrology progress note    THIS IS AN INCOMPLETE NOTE . FULL NOTE TO FOLLOW SHORTLY    Patient is seen and examined, events over the last 24 h noted .    Allergies:  penicillin (Other)    Hospital Medications:   MEDICATIONS  (STANDING):  acetylcysteine 20%  Inhalation 4 milliLiter(s) Inhalation every 6 hours  albuterol/ipratropium for Nebulization 3 milliLiter(s) Nebulizer every 6 hours  aMIOdarone    Tablet 200 milliGRAM(s) Oral daily  aMIOdarone    Tablet   Oral   aspirin  chewable 81 milliGRAM(s) Oral daily  atorvastatin 80 milliGRAM(s) Oral at bedtime  buMETAnide Injectable 2 milliGRAM(s) IV Push every 12 hours  chlorhexidine 0.12% Liquid 15 milliLiter(s) Oral Mucosa two times a day  chlorhexidine 2% Cloths 1 Application(s) Topical <User Schedule>  collagenase Ointment 1 Application(s) Topical two times a day  dextrose 5%. 1000 milliLiter(s) (100 mL/Hr) IV Continuous <Continuous>  dextrose 5%. 1000 milliLiter(s) (50 mL/Hr) IV Continuous <Continuous>  dextrose 50% Injectable 25 Gram(s) IV Push once  dextrose 50% Injectable 25 Gram(s) IV Push once  dextrose 50% Injectable 12.5 Gram(s) IV Push once  diltiazem    milliGRAM(s) Oral daily  glucagon  Injectable 1 milliGRAM(s) IntraMuscular once  heparin   Injectable 5000 Unit(s) SubCutaneous every 12 hours  insulin glargine Injectable (LANTUS) 10 Unit(s) SubCutaneous at bedtime  insulin lispro (ADMELOG) corrective regimen sliding scale   SubCutaneous three times a day before meals  levETIRAcetam  Solution 500 milliGRAM(s) Oral two times a day  pantoprazole  Injectable 40 milliGRAM(s) IV Push every 12 hours  silver sulfADIAZINE 1% Cream 1 Application(s) Topical every 12 hours  sodium zirconium cyclosilicate 10 Gram(s) Oral every 12 hours        VITALS:  T(F): 97.8 (06-22-23 @ 05:00), Max: 98.8 (06-21-23 @ 14:22)  HR: 83 (06-22-23 @ 05:00)  BP: 115/54 (06-22-23 @ 05:00)  RR: 18 (06-22-23 @ 05:00)  SpO2: 100% (06-22-23 @ 05:00)  Wt(kg): --    06-20 @ 07:01  -  06-21 @ 07:00  --------------------------------------------------------  IN: 870 mL / OUT: 1000 mL / NET: -130 mL    06-21 @ 07:01  -  06-22 @ 07:00  --------------------------------------------------------  IN: 870 mL / OUT: 0 mL / NET: 870 mL          PHYSICAL EXAM:  Constitutional: NAD  HEENT: anicteric sclera, oropharynx clear, MMM  Neck: No JVD  Respiratory: CTAB, no wheezes, rales or rhonchi  Cardiovascular: S1, S2, RRR  Gastrointestinal: BS+, soft, NT/ND  Extremities: No cyanosis or clubbing. No peripheral edema  :  No teixeira.   Skin: No rashes    LABS:  06-21    136  |  98  |  46<H>  ----------------------------<  106<H>  4.7   |  28  |  2.1<H>    Ca    8.1<L>      21 Jun 2023 16:32  Phos  1.9     06-21  Mg     2.0     06-21    TPro  5.3<L>  /  Alb  1.9<L>  /  TBili  0.3  /  DBili      /  AST  22  /  ALT  17  /  AlkPhos  229<H>  06-21                          7.2    10.47 )-----------( 571      ( 21 Jun 2023 16:32 )             23.2       Urine Studies:  Urinalysis Basic - ( 21 Jun 2023 16:32 )    Color:  / Appearance:  / SG:  / pH:   Gluc: 106 mg/dL / Ketone:   / Bili:  / Urobili:    Blood:  / Protein:  / Nitrite:    Leuk Esterase:  / RBC:  / WBC    Sq Epi:  / Non Sq Epi:  / Bacteria:           Iron 51, TIBC 111, %sat 46      [06-06-23 @ 10:40]  Ferritin 1956      [06-06-23 @ 10:40]  Vitamin D (25OH) 70      [06-03-23 @ 06:50]    HBsAb <3.0      [06-01-23 @ 22:50]  HBsAb Nonreact      [06-01-23 @ 22:50]  HBsAg Nonreact      [06-01-23 @ 22:50]  HBcAb Nonreact      [06-01-23 @ 22:50]  HCV 0.09, Nonreact      [11-05-21 @ 09:04]  HIV Nonreact      [06-08-23 @ 16:00]  HIV Nonreact      [06-08-23 @ 12:20]        RADIOLOGY & ADDITIONAL STUDIES:   Nephrology progress note  Patient is seen and examined, events over the last 24 h noted .  Lying in bed comfortable     Allergies:  penicillin (Other)    Hospital Medications:     MEDICATIONS  (STANDING):    acetylcysteine 20%  Inhalation 4 milliLiter(s) Inhalation every 6 hours  albuterol/ipratropium for Nebulization 3 milliLiter(s) Nebulizer every 6 hours  aMIOdarone    Tablet 200 milliGRAM(s) Oral daily  aspirin  chewable 81 milliGRAM(s) Oral daily  atorvastatin 80 milliGRAM(s) Oral at bedtime  buMETAnide Injectable 2 milliGRAM(s) IV Push every 12 hours  collagenase Ointment 1 Application(s) Topical two times a day  diltiazem    milliGRAM(s) Oral daily  glucagon  Injectable 1 milliGRAM(s) IntraMuscular once  heparin   Injectable 5000 Unit(s) SubCutaneous every 12 hours  insulin glargine Injectable (LANTUS) 10 Unit(s) SubCutaneous at bedtime  insulin lispro (ADMELOG) corrective regimen sliding scale   SubCutaneous three times a day before meals  levETIRAcetam  Solution 500 milliGRAM(s) Oral two times a day  pantoprazole  Injectable 40 milliGRAM(s) IV Push every 12 hours  silver sulfADIAZINE 1% Cream 1 Application(s) Topical every 12 hours  sodium zirconium cyclosilicate 10 Gram(s) Oral every 12 hours        VITALS:  T(F): 97.8 (06-22-23 @ 05:00), Max: 98.8 (06-21-23 @ 14:22)  HR: 83 (06-22-23 @ 05:00)  BP: 115/54 (06-22-23 @ 05:00)  RR: 18 (06-22-23 @ 05:00)  SpO2: 100% (06-22-23 @ 05:00)      06-20 @ 07:01  -  06-21 @ 07:00  --------------------------------------------------------  IN: 870 mL / OUT: 1000 mL / NET: -130 mL    06-21 @ 07:01  -  06-22 @ 07:00  --------------------------------------------------------  IN: 870 mL / OUT: 0 mL / NET: 870 mL          PHYSICAL EXAM:    HEENT: trached on MV   Respiratory: CTAB,   Cardiovascular: S1, S2, RRR  Gastrointestinal: BS+, soft, NT/ND  Extremities: No cyanosis or clubbing. No peripheral edema  :  No teixeira.   Skin: No rashes    LABS:  06-22    135  |  97<L>  |  52<H>  ----------------------------<  164<H>  4.8   |  26  |  2.3<H>    Ca    8.1<L>      22 Jun 2023 07:39  Phos  1.8     06-22  Mg     2.1     06-22    TPro  5.2<L>  /  Alb  1.9<L>  /  TBili  0.3  /  DBili  x   /  AST  29  /  ALT  22  /  AlkPhos  251<H>  06-22 06-21    136  |  98  |  46<H>  ----------------------------<  106<H>  4.7   |  28  |  2.1<H>    Ca    8.1<L>      21 Jun 2023 16:32  Phos  1.9     06-21  Mg     2.0     06-21    TPro  5.3<L>  /  Alb  1.9<L>  /  TBili  0.3  /  DBili      /  AST  22  /  ALT  17  /  AlkPhos  229<H>  06-21                          7.2    10.47 )-----------( 571      ( 21 Jun 2023 16:32 )             23.2       Urine Studies:  Urinalysis Basic - ( 21 Jun 2023 16:32 )    Color:  / Appearance:  / SG:  / pH:   Gluc: 106 mg/dL / Ketone:   / Bili:  / Urobili:    Blood:  / Protein:  / Nitrite:    Leuk Esterase:  / RBC:  / WBC    Sq Epi:  / Non Sq Epi:  / Bacteria:           Iron 51, TIBC 111, %sat 46      [06-06-23 @ 10:40]  Ferritin 1956      [06-06-23 @ 10:40]  Vitamin D (25OH) 70      [06-03-23 @ 06:50]    HBsAb <3.0      [06-01-23 @ 22:50]  HBsAb Nonreact      [06-01-23 @ 22:50]  HBsAg Nonreact      [06-01-23 @ 22:50]  HBcAb Nonreact      [06-01-23 @ 22:50]  HCV 0.09, Nonreact      [11-05-21 @ 09:04]  HIV Nonreact      [06-08-23 @ 16:00]  HIV Nonreact      [06-08-23 @ 12:20]        RADIOLOGY & ADDITIONAL STUDIES:

## 2023-06-22 NOTE — PROGRESS NOTE ADULT - SUBJECTIVE AND OBJECTIVE BOX
VIJAYSTEPHONALICIA  63y  Male      Patient is a 63y old  Male who presents with a chief complaint of Abnormal labs       INTERVAL HPI/OVERNIGHT EVENTS:      ******************************* REVIEW OF SYSTEMS:**********************************************    All other review of systems negative    *********************** VITALS ******************************************    T(F): 97.2 (06-22-23 @ 13:00)  HR: 75 (06-22-23 @ 13:00) (75 - 86)  BP: 99/56 (06-22-23 @ 13:00) (99/56 - 122/57)  RR: 18 (06-22-23 @ 13:00) (18 - 18)  SpO2: 96% (06-22-23 @ 09:50) (96% - 100%)    06-21-23 @ 07:01  -  06-22-23 @ 07:00  --------------------------------------------------------  IN: 870 mL / OUT: 0 mL / NET: 870 mL            06-21-23 @ 07:01  -  06-22-23 @ 07:00  --------------------------------------------------------  IN: 870 mL / OUT: 0 mL / NET: 870 mL        ******************************** PHYSICAL EXAM:**************************************************  GENERAL: NAD    PSYCH: no agitation, baseline mentation  HEENT: vented thru trach     NERVOUS SYSTEM:  Alert & awake x1-2  PULMONARY    CTA    CARDIOVASCULAR: S1S2 RRR    GI: Soft, NT, ND; BS present.    EXTREMITIES:  2+ Peripheral edema UE/LE     LYMPH: No lymphadenopathy noted    SKIN: Sacral decubiti ,     **************************** LABS *******************************************************                          7.0    11.90 )-----------( 644      ( 22 Jun 2023 07:39 )             22.7     06-22    135  |  97<L>  |  52<H>  ----------------------------<  164<H>  4.8   |  26  |  2.3<H>    Ca    8.1<L>      22 Jun 2023 07:39  Phos  1.8     06-22  Mg     2.1     06-22    TPro  5.2<L>  /  Alb  1.9<L>  /  TBili  0.3  /  DBili  x   /  AST  29  /  ALT  22  /  AlkPhos  251<H>  06-22      Urinalysis Basic - ( 22 Jun 2023 07:39 )    Color: x / Appearance: x / SG: x / pH: x  Gluc: 164 mg/dL / Ketone: x  / Bili: x / Urobili: x   Blood: x / Protein: x / Nitrite: x   Leuk Esterase: x / RBC: x / WBC x   Sq Epi: x / Non Sq Epi: x / Bacteria: x        Lactate Trend        CAPILLARY BLOOD GLUCOSE      POCT Blood Glucose.: 142 mg/dL (22 Jun 2023 12:33)          **************************Active Medications *******************************************  penicillin (Other)      acetaminophen     Tablet .. 650 milliGRAM(s) Oral every 6 hours PRN  acetylcysteine 20%  Inhalation 4 milliLiter(s) Inhalation every 6 hours  albuterol/ipratropium for Nebulization 3 milliLiter(s) Nebulizer every 6 hours  aMIOdarone    Tablet 200 milliGRAM(s) Oral daily  aMIOdarone    Tablet   Oral   aspirin  chewable 81 milliGRAM(s) Oral daily  atorvastatin 80 milliGRAM(s) Oral at bedtime  buMETAnide Injectable 2 milliGRAM(s) IV Push every 12 hours  chlorhexidine 0.12% Liquid 15 milliLiter(s) Oral Mucosa two times a day  chlorhexidine 2% Cloths 1 Application(s) Topical <User Schedule>  collagenase Ointment 1 Application(s) Topical two times a day  dextrose 5%. 1000 milliLiter(s) IV Continuous <Continuous>  dextrose 5%. 1000 milliLiter(s) IV Continuous <Continuous>  dextrose 50% Injectable 25 Gram(s) IV Push once  dextrose 50% Injectable 25 Gram(s) IV Push once  dextrose 50% Injectable 12.5 Gram(s) IV Push once  dextrose Oral Gel 15 Gram(s) Oral once PRN  diltiazem    milliGRAM(s) Oral daily  glucagon  Injectable 1 milliGRAM(s) IntraMuscular once  heparin   Injectable 5000 Unit(s) SubCutaneous every 12 hours  insulin glargine Injectable (LANTUS) 10 Unit(s) SubCutaneous at bedtime  insulin lispro (ADMELOG) corrective regimen sliding scale   SubCutaneous three times a day before meals  levETIRAcetam  Solution 500 milliGRAM(s) Oral two times a day  midodrine. 2.5 milliGRAM(s) Oral three times a day PRN  pantoprazole  Injectable 40 milliGRAM(s) IV Push every 12 hours  silver sulfADIAZINE 1% Cream 1 Application(s) Topical every 12 hours  sodium chloride 0.9% Bolus. 100 milliLiter(s) IV Bolus every 5 minutes PRN  sodium chloride 0.9% Bolus. 100 milliLiter(s) IV Bolus every 5 minutes PRN  sodium phosphate 30 milliMole(s)/500 mL IVPB 30 milliMole(s) IV Intermittent once  sodium zirconium cyclosilicate 10 Gram(s) Oral every 12 hours      ***************************************************  RADIOLOGY & ADDITIONAL TESTS:    Imaging Personally Reviewed:  [ ] YES  [ ] NO    HEALTH ISSUES - PROBLEM Dx:  Sepsis    Acute UTI    History of intracranial hemorrhage    Palliative care by specialist    Anemia    MICHELLE (acute kidney injury)    Pain

## 2023-06-22 NOTE — PROGRESS NOTE ADULT - ASSESSMENT
64 yo M with  PMH of ICH (2021) s/p trach and PEG, HTN, HLD, T2DM, CAD s/p stents, Recurrent C diff, BIBEMS from [facility] for abnormal labs. Patient non-verbal at baseline - limited history. Per NH chart  have a BUN greater than 200 and creatinine of 4.3 on outpatient labs. Outpatient CXR also w/ new L sided pleural effusion. With EMS patient was also found to be in irregular rhythm, no known history of A-fib or a flutter.  MICHELLE on CKD 3a - severe azotemia  likely due to GIB / hypotension  Started HD on 6/1/23  Acute anemia d/t UGIB  Troponemia  Lactic acidosis resolved   DKA resolved   Afib     -  repeat labs today noted  / cr stable   - no HD today / will assess daily needs   -  continue bumex , document accurate UO please    -  feed : blake juarez noted / lokelma 10 q   12   - repeat h/h / last sat elevated   - please replete ph / need IP > 2   - bladder scan q shift  - very poor prognosis   will follow

## 2023-06-23 NOTE — PROGRESS NOTE ADULT - ASSESSMENT
64 yo M with  PMH of ICH (2021) s/p trach and PEG, HTN, HLD, T2DM, CAD s/p stents, Recurrent C diff, BIBEMS from [facility] for abnormal labs. Patient non-verbal at baseline - limited history. Per NH chart  have a BUN greater than 200 and creatinine of 4.3 on outpatient labs. Outpatient CXR also w/ new L sided pleural effusion. In ED, patient was hypotensive with Afib with RVR, found to be in acure renal failure, anemic with elevated trops. He was started on Amiodarone GTT, PPI GTT, gastric lavage with coffee ground secretions, started on broad spectrum abx and admitted to MICU  While in MICU, course complicated by DKA, s/p insulin drip, MICHELLE started on HD, acute anemia s/p PRBC transfusion, NSTEMI, downgraded to SDU -> floor    Acute Renal Failure, started on HD 6/1  Fluid Overload  HAGMA  - non oliguric, RBUS without hydro   - started on HD 6/1 via R [place]   - Cr much improved s/p HD yesterday, now 1.9  - Nephro on board- last HD 6/20  - continue bumex , document accurate UO   - c/w midodrine 10mg q8hr  - c/w sodium bicarb 1300mg PO TID  -  TTE 6/1 - EF 63%, GIDD     #fever, suspected VAP-improved  #Candiduria with U cx C tropicalis  -  CXR Further significant increase in left-sided opacities/effusion with minimal aeration of the left lung. Right-sided opacities redemonstrated    6/11 BCX NGTD     6/11 UCX   >100,000 CFU/ml Pseudomonas aeruginosa    6/11 sputum   Three or more mixed gram negative rods including    Moderate Pseudomonas aeruginosa (Carbapenem Resistant)    6/7 BCX NGTD   - ID consult 6/14: Recommended switching gentamicin to ceftolozane-tazobactam 2.25g IV x1, followed by 450mg IV q8h (HD dosing) for the treatment of pneumonia and UTI due to carbapenem-resistant Pseudomonas aeruginosa x 7 days - ended   - fluconazole, end 6/15    Acute anemia  - HgB stable (baseline 8)   - evaluated by GI - no evidence of GI bleed (PEG tube flushed with return of bile and no blood)  - Monitor H/H closely  - Active T+S  - sp multiple units on this hospitalization    Hyponatremia, likely hypervolemic-resolved  - c/w bumex 2mg iv q12hr and HD per nephro  - daily BMP    Afib w/ RVR - now rate controlled   - currently rate controlled s/p amio gtt  - changed amio to 200 q24H   - started [id number] mg daily as he went back into afib rvr   - now rate controlled    Acute Decompensated CHF  CAD s/p stents  NSTEMI  - Trops 1.80-->1.63-->1.57-->1.55  - seen by cardio 5/31,  c/w asa and statin, medical management for NSTEMI, CAD, c/w Diuresis    DKA, resolved  - s/p insulin drip, now on basal/bolus and tube feeds. Monitor FS     Hx ICH s/p trach and PEG  Bedbound from NH   - c/w keppra 500mg BID for seizure prophylaxis    #Unstageable sacral ulcer  - Burn recs in    DVT Ppx: HSQ  GI Ppx: Pantoprazole 40mg BID  Diet: Peg tube  Activity: bedrest    Overall Prognosis poor.  Pending : GOC with family again on Monday, as not decided yet after today's meeting.

## 2023-06-23 NOTE — PROGRESS NOTE ADULT - PROBLEM SELECTOR PROBLEM 1
MICHELLE (acute kidney injury)

## 2023-06-23 NOTE — PROGRESS NOTE ADULT - ASSESSMENT
Assessment  - MICHELLE on CKD3 now on HD  - fluid overload and anasarca  - multiple areas of clean but deep decubiti  - DM  - hyponatremia  - ? DKA on admission  - A fib  - h/o recurrent C diff - cont to have loose brown BMs - but pt is receiving both miralax and senna!!     SUGGEST:  -  -continue with Peptamen AF for now     375 ml over 45 min x 4 feeds/d --> 1800 kcal (low % carb) 114 gm protein (whey source), 1200 mg phos, 62 mEq K/d  - check poc glucose prior to each feeding  - f/u phos with pre-HD labs  - limit pre and post feed flushes to 30 ml  - document BMs

## 2023-06-23 NOTE — PROGRESS NOTE ADULT - PROBLEM SELECTOR PLAN 1
MICHELLE on CKD3. Need for HD. High risk  -plan for dialysis today - family in agreement previously  -trend creatinine and UOP  -f/u renal.
MICHELLE on CKD3. Need for HD. High risk  -plan for dialysis today - family in agreement  -trend creatinine and UOP  -f/u renal.
MICHELLE on CKD3. Dialysis started 6/1. High risk requiring IV diuresis and intensive monitoring  -dialysis per renal  -continue IV bumex BID  -trend creatinine and UOP  -f/u renal  -GOC
MICHELLE on CKD3. Dialysis started 6/1. High risk requiring IV diuresis and intensive monitoring  -dialysis per renal  -continue IV bumex BID  -trend creatinine and UOP  -f/u renal  -GOC as appropriate
MICHELLE on CKD3. Dialysis started 6/1. High risk requiring IV diuresis and intensive monitoring  -dialysis per renal  -continue IV bumex BID  -trend creatinine and UOP  -f/u renal  -GOC meeting tomorrow
MICHELLE on CKD3. Dialysis started 6/1. High risk requiring IV diuresis and intensive monitoring  -dialysis per renal  -continue IV bumex BID  -trend creatinine and UOP  -f/u renal

## 2023-06-23 NOTE — PROGRESS NOTE ADULT - SUBJECTIVE AND OBJECTIVE BOX
VIJAYSTEPHONALICIA  63y  Male      Patient is a 63y old  Male who presents with a chief complaint of Abnormal labs     INTERVAL HPI/OVERNIGHT EVENTS:      ******************************* REVIEW OF SYSTEMS:**********************************************    All other review of systems negative    *********************** VITALS ******************************************    T(F): 97.3 (06-23-23 @ 14:00)  HR: 82 (06-23-23 @ 14:00) (76 - 90)  BP: 104/56 (06-23-23 @ 14:00) (102/62 - 143/65)  RR: 20 (06-23-23 @ 14:00) (20 - 20)  SpO2: 100% (06-23-23 @ 14:00) (98% - 100%)    06-22-23 @ 07:01  -  06-23-23 @ 07:00  --------------------------------------------------------  IN: 870 mL / OUT: 0 mL / NET: 870 mL    06-23-23 @ 07:01  -  06-23-23 @ 16:39  --------------------------------------------------------  IN: 405 mL / OUT: 0 mL / NET: 405 mL            06-22-23 @ 07:01  -  06-23-23 @ 07:00  --------------------------------------------------------  IN: 870 mL / OUT: 0 mL / NET: 870 mL    06-23-23 @ 07:01  -  06-23-23 @ 16:39  --------------------------------------------------------  IN: 405 mL / OUT: 0 mL / NET: 405 mL        ******************************** PHYSICAL EXAM:**************************************************  GENERAL: NAD    PSYCH: no agitation, baseline mentation  HEENT: s/p craniotomy     NERVOUS SYSTEM:  Alert & awake x 1-2, non verbal   PULMONARY: MICHAEL, CTA    CARDIOVASCULAR: S1S2 RRR    GI: Soft, NT, ND; BS present.    EXTREMITIES:  2+ Peripheral edema    LYMPH: No lymphadenopathy noted    SKIN: sacral decubitus / b/l heel decub    **************************** LABS *******************************************************                          7.6    11.87 )-----------( 671      ( 23 Jun 2023 08:28 )             24.2     06-23    135  |  97<L>  |  58<H>  ----------------------------<  153<H>  5.3<H>   |  26  |  2.7<H>    Ca    8.7      23 Jun 2023 08:28  Phos  2.2     06-23  Mg     2.1     06-23    TPro  5.4<L>  /  Alb  1.9<L>  /  TBili  0.2  /  DBili  x   /  AST  25  /  ALT  21  /  AlkPhos  246<H>  06-23      Urinalysis Basic - ( 23 Jun 2023 08:28 )    Color: x / Appearance: x / SG: x / pH: x  Gluc: 153 mg/dL / Ketone: x  / Bili: x / Urobili: x   Blood: x / Protein: x / Nitrite: x   Leuk Esterase: x / RBC: x / WBC x   Sq Epi: x / Non Sq Epi: x / Bacteria: x        Lactate Trend        CAPILLARY BLOOD GLUCOSE      POCT Blood Glucose.: 169 mg/dL (23 Jun 2023 12:20)          **************************Active Medications *******************************************  penicillin (Other)      acetaminophen     Tablet .. 650 milliGRAM(s) Oral every 6 hours PRN  acetylcysteine 20%  Inhalation 4 milliLiter(s) Inhalation every 6 hours  albuterol/ipratropium for Nebulization 3 milliLiter(s) Nebulizer every 6 hours  aMIOdarone    Tablet 200 milliGRAM(s) Oral daily  aMIOdarone    Tablet   Oral   aspirin  chewable 81 milliGRAM(s) Oral daily  atorvastatin 80 milliGRAM(s) Oral at bedtime  buMETAnide Injectable 2 milliGRAM(s) IV Push every 12 hours  chlorhexidine 0.12% Liquid 15 milliLiter(s) Oral Mucosa two times a day  chlorhexidine 2% Cloths 1 Application(s) Topical <User Schedule>  collagenase Ointment 1 Application(s) Topical two times a day  dextrose 5%. 1000 milliLiter(s) IV Continuous <Continuous>  dextrose 5%. 1000 milliLiter(s) IV Continuous <Continuous>  dextrose 50% Injectable 12.5 Gram(s) IV Push once  dextrose 50% Injectable 25 Gram(s) IV Push once  dextrose 50% Injectable 25 Gram(s) IV Push once  dextrose Oral Gel 15 Gram(s) Oral once PRN  diltiazem    milliGRAM(s) Oral daily  glucagon  Injectable 1 milliGRAM(s) IntraMuscular once  heparin   Injectable 5000 Unit(s) SubCutaneous every 12 hours  insulin glargine Injectable (LANTUS) 10 Unit(s) SubCutaneous at bedtime  insulin lispro (ADMELOG) corrective regimen sliding scale   SubCutaneous three times a day before meals  levETIRAcetam  Solution 500 milliGRAM(s) Oral two times a day  midodrine. 2.5 milliGRAM(s) Oral three times a day PRN  pantoprazole  Injectable 40 milliGRAM(s) IV Push every 12 hours  silver sulfADIAZINE 1% Cream 1 Application(s) Topical every 12 hours  sodium chloride 0.9% Bolus. 100 milliLiter(s) IV Bolus every 5 minutes PRN  sodium chloride 0.9% Bolus. 100 milliLiter(s) IV Bolus every 5 minutes PRN  sodium phosphate 30 milliMole(s)/500 mL IVPB 30 milliMole(s) IV Intermittent once  sodium zirconium cyclosilicate 10 Gram(s) Oral every 12 hours      ***************************************************  RADIOLOGY & ADDITIONAL TESTS:    Imaging Personally Reviewed:  [ ] YES  [ ] NO    HEALTH ISSUES - PROBLEM Dx:  Sepsis    Acute UTI    History of intracranial hemorrhage    Palliative care by specialist    Anemia    MICHELLE (acute kidney injury)    Pain

## 2023-06-23 NOTE — PROGRESS NOTE ADULT - CONVERSATION DETAILS
Spoke to wife, daughter, and niece at patients bedside regarding GOC going forward. Patient already DNR, trach+PEG and on mechanical ventilation. Has previously failed TOB off ventilator. Explained CMO and hospice to family including a shift in focus to comfort and symptom managed care. They said they would speak amongst themselves as they are unable to come to a decision at this time. Explained that they do not need to come to any decisions at this time. They were waiting for attending to come by and give medical update.

## 2023-06-23 NOTE — PROGRESS NOTE ADULT - SUBJECTIVE AND OBJECTIVE BOX
[employer] SUPPORT TEAM  -  PROGRESS NOTE   remains vented, + trach   + anasarca somewhat improved  abd soft, + GT  + sacral ulcer  pt is  receiving hydrolyzed enteral Rx)  medical f/u noted   on HD  REVIEW OF SYSTEMS:  Negative except as noted above.   VITALS:  T(F): --  HR: 89 (06-23 @ 08:25) (78 - 89)  BP: 121/64 (06-23 @ 08:07) (121/64 - 121/64)  SpO2: 99% (06-23 @ 08:25) (99% - 100%)  Mode: AC/ CMV (Assist Control/ Continuous Mandatory Ventilation)  RR (machine): 14  TV (machine): 450  FiO2: 40  PEEP: 8  ITime: 1  MAP: 13  PIP: 32    HEIGHT/WEIGHT/BMI:   Height (cm): 170.2 (06-01), 180.3 (10-04)  Weight (kg): 93.3 (06-05), 66.4 (10-04)  BMI (kg/m2): 32.2 (06-05), 22.9 (06-01), 20.4 (10-04)  06-22-23 @ 07:01  -  06-23-23 @ 07:00  --------------------------------------------------------  IN:    Enteral Tube Flush: 120 mL    Peptamen A.F.: 750 mL  Total IN: 870 mL    OUT:  Total OUT: 0 mL    Total NET: 870 mL  MEDICATIONS:   acetaminophen     Tablet .. 650 milliGRAM(s) Oral every 6 hours PRN  acetylcysteine 20%  Inhalation 4 milliLiter(s) Inhalation every 6 hours  albuterol/ipratropium for Nebulization 3 milliLiter(s) Nebulizer every 6 hours  aMIOdarone    Tablet   Oral   aMIOdarone    Tablet 200 milliGRAM(s) Oral daily  aspirin  chewable 81 milliGRAM(s) Oral daily  atorvastatin 80 milliGRAM(s) Oral at bedtime  buMETAnide Injectable 2 milliGRAM(s) IV Push every 12 hours  chlorhexidine 0.12% Liquid 15 milliLiter(s) Oral Mucosa two times a day  chlorhexidine 2% Cloths 1 Application(s) Topical <User Schedule>  collagenase Ointment 1 Application(s) Topical two times a day  diltiazem    milliGRAM(s) Oral daily  glucagon  Injectable 1 milliGRAM(s) IntraMuscular once  heparin   Injectable 5000 Unit(s) SubCutaneous every 12 hours  insulin glargine Injectable (LANTUS) 10 Unit(s) SubCutaneous at bedtime  insulin lispro (ADMELOG) corrective regimen sliding scale   SubCutaneous three times a day before meals  levETIRAcetam  Solution 500 milliGRAM(s) Oral two times a day  midodrine. 2.5 milliGRAM(s) Oral three times a day PRN  pantoprazole  Injectable 40 milliGRAM(s) IV Push every 12 hours  silver sulfADIAZINE 1% Cream 1 Application(s) Topical every 12 hours  sodium chloride 0.9% Bolus. 100 milliLiter(s) IV Bolus every 5 minutes PRN  sodium chloride 0.9% Bolus. 100 milliLiter(s) IV Bolus every 5 minutes PRN  sodium phosphate 30 milliMole(s)/500 mL IVPB 30 milliMole(s) IV Intermittent once  sodium zirconium cyclosilicate 10 Gram(s) Oral every 12 hours    LABS:                         7.6    11.87 )-----------( 671      ( 23 Jun 2023 08:28 )             24.2     135  |  97<L>  |  58<H>  ----------------------------<  153<H>          (06-23-23 @ 08:28)  5.3<H>   |  26  |  2.7<H>    Ca    8.7          (06-23-23 @ 08:28)  Phos  2.2         (06-23-23 @ 08:28)  Mg     2.1         (06-23-23 @ 08:28)    TPro  5.4<L>  /  Alb  1.9<L>  /  TBili  0.2  /  DBili  x   /  AST  25  /  ALT  21  /  AlkPhos  246<H>       06-23-23 @ 08:28  Vitamin D, 25-Hydroxy: 70 ng/mL (06-03 @ 06:50)  Folate: >20.0 ng/mL (06-03 @ 06:50)  Vitamin B12, Serum: 1908 pg/mL (06-06 @ 10:40)  Zinc Level, Plasma: 54 ug/dL (06-03 @ 06:50)  Blood Glucose (Past 24 hours):  169 mg/dL (06-23 @ 12:20)  162 mg/dL (06-23 @ 08:17)  128 mg/dL (06-23 @ 06:34)  160 mg/dL (06-22 @ 21:43)  DIET:   Diet, NPO with Tube Feed:   Tube Feeding Modality: Gastrostomy  Peptamen A.F. Formula  Total Volume for 24 Hours (mL): 1500  Bolus  Total Volume of Bolus (mL):  375  Tube Feed Frequency: Every 6 hours   Tube Feed Start Time: 16:00  Bolus Feed Rate (mL per Hour): 500   Bolus Feed Duration (in Hours): 0.75  Free Water Flush Instructions:  flush GT with 30 ml water syringe push before & after each feed (06-02-23 @ 15:58) [Active]  RADIOLOGY:   < from: Xray Chest 1 View- PORTABLE-Routine (Xray Chest 1 View- PORTABLE-Routine in AM.) (06.16.23 @ 06:52) >  Impression:  Increased elevation right hemidiaphragm.  Stable bilateral opacities with left greater than right effusions. No   pneumothorax

## 2023-06-23 NOTE — PROGRESS NOTE ADULT - PROBLEM SELECTOR PLAN 6
-DNR  -ongoing medical management  -GOC discussion ongoing
-DNR  -ongoing medical management  -family interested in CMO/hospice information at this time and family meeting tomorrow 1pm
-DNR  -ongoing medical management  -family not interested in CMO/hospice at this time  -will sign off for now

## 2023-06-23 NOTE — PROGRESS NOTE ADULT - SUBJECTIVE AND OBJECTIVE BOX
HPI:  64 yo M with  PMH of ICH (2021) s/p trach and PEG, HTN, HLD, T2DM, CAD s/p stents, Recurrent C diff, BIBEMS from [facility] for abnormal labs. Patient non-verbal at baseline - limited history. Per NH chart  have a BUN greater than 200 and creatinine of 4.3 on outpatient labs. Outpatient CXR also w/ new L sided pleural effusion. With EMS patient was also found to be in irregular rhythm, no known history of A-fib or a flutter.  In ED patient had borderline BP, and was in Atrial fibrillation. He was started on amiodarone gtt. Labs showed , Cr. 4.1, Hb 6.6. Trops 1.8. He was given 1U PRBC, gastric lavage revealed coffee ground emesis w/no active bleeding. He was started on PPI drip and GI consulted in ED. He was given IV aztreonam and vancomycin.   Patient being admitted to ICU for management of Sepsis likely from UTI, MICHELLE likely prerenal and NSTEMI.        (30 May 2023 23:39)      Interval History  -family meeting planned for Friday 6/23/23 to discuss CMO/Hospice  -6/23: had family meeting, no decision made yet    ADVANCE DIRECTIVES:    [ ] Full Code [x ] DNR [ ] MOLST [ ] Living Will     DECISION MAKER(s):  [ ] Health Care Proxy(s)  [ ] Surrogate(s)  [ ] Guardian           Name(s): Phone Number(s): Patricio Camacho [phone]    BASELINE (I)ADL(s) (prior to admission):  [place]: [ ]Total  [ ] Moderate [ ]Dependent  Palliative Performance Status Version 2:         %    http://npcrc.org/files/news/palliative_performance_scale_ppsv2.pdf    Allergies    penicillin (Other)    Intolerances    MEDICATIONS  (STANDING):  acetylcysteine 20%  Inhalation 4 milliLiter(s) Inhalation every 6 hours  albuterol/ipratropium for Nebulization 3 milliLiter(s) Nebulizer every 6 hours  aMIOdarone    Tablet   Oral   aMIOdarone    Tablet 200 milliGRAM(s) Oral daily  aspirin  chewable 81 milliGRAM(s) Oral daily  atorvastatin 80 milliGRAM(s) Oral at bedtime  buMETAnide Injectable 2 milliGRAM(s) IV Push every 12 hours  chlorhexidine 0.12% Liquid 15 milliLiter(s) Oral Mucosa two times a day  chlorhexidine 2% Cloths 1 Application(s) Topical <User Schedule>  collagenase Ointment 1 Application(s) Topical two times a day  dextrose 5%. 1000 milliLiter(s) (50 mL/Hr) IV Continuous <Continuous>  dextrose 5%. 1000 milliLiter(s) (100 mL/Hr) IV Continuous <Continuous>  dextrose 50% Injectable 25 Gram(s) IV Push once  dextrose 50% Injectable 25 Gram(s) IV Push once  dextrose 50% Injectable 12.5 Gram(s) IV Push once  diltiazem    milliGRAM(s) Oral daily  glucagon  Injectable 1 milliGRAM(s) IntraMuscular once  heparin   Injectable 5000 Unit(s) SubCutaneous every 12 hours  insulin glargine Injectable (LANTUS) 10 Unit(s) SubCutaneous at bedtime  insulin lispro (ADMELOG) corrective regimen sliding scale   SubCutaneous three times a day before meals  levETIRAcetam  Solution 500 milliGRAM(s) Oral two times a day  pantoprazole  Injectable 40 milliGRAM(s) IV Push every 12 hours  silver sulfADIAZINE 1% Cream 1 Application(s) Topical every 12 hours  sodium phosphate 30 milliMole(s)/500 mL IVPB 30 milliMole(s) IV Intermittent once  sodium zirconium cyclosilicate 10 Gram(s) Oral every 12 hours    MEDICATIONS  (PRN):  acetaminophen     Tablet .. 650 milliGRAM(s) Oral every 6 hours PRN Temp greater or equal to 38C (100.4F), Mild Pain (1 - 3)  dextrose Oral Gel 15 Gram(s) Oral once PRN Blood Glucose LESS THAN 70 milliGRAM(s)/deciliter  midodrine. 2.5 milliGRAM(s) Oral three times a day PRN SBP < 110  sodium chloride 0.9% Bolus. 100 milliLiter(s) IV Bolus every 5 minutes PRN SBP LESS THAN or EQUAL to 100 mmHg  sodium chloride 0.9% Bolus. 100 milliLiter(s) IV Bolus every 5 minutes PRN SBP LESS THAN or EQUAL to 80 mmHg      PRESENT SYMPTOMS: [x ]Unable to obtain due to poor mentation   Source if other than patient:  [ ]Family   [ ]Team     Pain: [ ]yes [ ]no  QOL impact -   Location -                    Aggravating factors -  Quality -  Radiation -  Timing-  Severity (0-10 scale):  Minimal acceptable level (0-10 scale):     CPOT:  0  https://[email or web address]/getattachment/[email or web address]/Critical-Care-Pain-Observation-Tool-(CPOT)    PAIN AD Score:   http://[email or web address]/painad.pdf (press ctrl +  left click to view)    Dyspnea:                           [ ]None[ ]Mild [ ]Moderate [ ]Severe     Respiratory Distress Observation Scale (RDOS): 1- on vent  A score of 0 to 2 signifies little or no respiratory distress, 3 signifies mild distress, scores 4 to 6 indicate moderate distress, and scores greater than 7 signify severe distress  [email or web address]/[email or web address]/files/[email or web address]-efetleahuon-pqmixldg-hdneykjjoej-fkftd.pdf    Anxiety:                             [ ]None[ ]Mild [ ]Moderate [ ]Severe   Fatigue:                             [ ]None[ ]Mild [ ]Moderate [ ]Severe   Nausea:                             [ ]None[ ]Mild [ ]Moderate [ ]Severe   Loss of appetite:              [ ]None[ ]Mild [ ]Moderate [ ]Severe   Constipation:                    [ ]None[ ]Mild [ ]Moderate [ ]Severe    PCSSQ [Palliative Care Spiritual Screening Question]    Severity (0-10):    Score of 4 or greater indicates consideration of Chaplaincy referral    Chaplaincy Referral: [ ] Yes [ ] Refused [ ] Following         Caregiver [place]:  [ ] Yes [ ] No [ ] Deferred    Social Work Referral [ ]    Patient and Family Centered Care Referral [ ]         Anticipatory Grief Present: [ ] Yes [ ] No [ ] Deferred    Social Work Referral [ ]    Patient and Family Centered Care Referral [ ]    Other Symptoms:  [ ]All other review of systems negative     Palliative Performance Status Version 2:         %    http://npcrc.org/files/news/palliative_performance_scale_ppsv2.pdf  PHYSICAL EXAM:  Vital Signs Last 24 Hrs  T(C): 36.6 (22 Jun 2023 20:40), Max: 36.6 (22 Jun 2023 20:40)  T(F): 97.9 (22 Jun 2023 20:40), Max: 97.9 (22 Jun 2023 20:40)  HR: 89 (23 Jun 2023 08:25) (73 - 89)  BP: 121/64 (23 Jun 2023 08:07) (102/62 - 121/64)  BP(mean): --  RR: --  SpO2: 99% (23 Jun 2023 08:25) (98% - 100%)            GENERAL:  [ ] No acute distress [ x]Lethargic  [ ]Unarousable  [ ]Verbal  [ ]Non-Verbal [ ]Cachexia    BEHAVIORAL/PSYCH:  [ ]Alert and Oriented x  [ ] Anxiety [ ] Delirium [ ] Agitation [x ] Calm   EYES: [ ] No scleral icterus [ ] Scleral icterus [ x] Closed  ENMT:  [ ]Dry mouth  [x ]No external oral lesions [ ] No external ear or nose lesions  CARDIOVASCULAR:  [ ]Regular [ ]Irregular [ ]Tachy [x ]Not Tachy  [ ]Bridger [ ] Edema [ ] No edema  PULMONARY:  [ ]Tachypnea  [ ]Audible excessive secretions [ ] No labored breathing [ x] mildly labored breathing [ ] labored breathing  GASTROINTESTINAL: [ ]Soft  [ ]Distended  [x ]Not distended [ ]Non tender [ ]Tender  MUSCULOSKELETAL: [ ]No clubbing [ ] clubbing  [x ] No cyanosis [ ] cyanosis  NEUROLOGIC: [ ]No focal deficits  [ ]Follows commands  [x ]Does not follow commands  [x ]Cognitive impairment  [ ]Dysphagia  [ ]Dysarthria  [ ]Paresis   SKIN: [ ] Jaundiced [ ] Non-jaundiced [ ]Rash [x ]No Rash [ ] Warm [ ] Dry  MISC/LINES: [ ] ET tube [x ] Trach [ ]NGT/OGT [ ]PEG [ ]Barney    CRITICAL CARE:  [ ] Shock Present  [ ]Septic [ ]Cardiogenic [ ]Neurologic [ ]Hypovolemic  [ ]  Vasopressors [ ]  Inotropes   [ ]Respiratory failure present [x ]Mechanical ventilation [ ]Non-invasive ventilatory support [ ]High flow  [ ]Acute  [ ]Chronic [ ]Hypoxic  [ ]Hypercarbic [ ]Other  [ ]Other organ failure     LABS: reviewed by me                                   7.6    11.87 )-----------( 671      ( 23 Jun 2023 08:28 )             24.2   06-23    135  |  97<L>  |  58<H>  ----------------------------<  153<H>  5.3<H>   |  26  |  2.7<H>    Ca    8.7      23 Jun 2023 08:28  Phos  2.2     06-23  Mg     2.1     06-23    TPro  5.4<L>  /  Alb  1.9<L>  /  TBili  0.2  /  DBili  x   /  AST  25  /  ALT  21  /  AlkPhos  246<H>  06-23        Urinalysis Basic - ( 22 Jun 2023 07:39 )    Color: x / Appearance: x / SG: x / pH: x  Gluc: 164 mg/dL / Ketone: x  / Bili: x / Urobili: x   Blood: x / Protein: x / Nitrite: x   Leuk Esterase: x / RBC: x / WBC x   Sq Epi: x / Non Sq Epi: x / Bacteria: x      RADIOLOGY & ADDITIONAL STUDIES: reviewed by me    < from: CT Chest No Cont (06.15.23 @ 16:09) >    Interval development bilateral large pleural effusions with persistent   lower lobe consolidations.    Intralobular septal thickening in the well aerated portions of the upper   lobes are noted. No pneumothorax or parenchymal mass.    --- End of Report ---    < end of copied text >        EKG: reviewed by me    < from: 12 Lead ECG (06.12.23 @ 13:30) >    Ventricular Rate 103 BPM    Atrial Rate 103 BPM    P-R Interval 120 ms    QRS Duration 64 ms    Q-T Interval 322 ms    QTC Calculation(Bazett) 421 ms    P Axis 47 degrees    R Axis 23 degrees    T Axis 26 degrees    Diagnosis Line Sinus tachycardia  Low voltage QRS  Borderline ECG    < end of copied text >          PROTEIN CALORIE MALNUTRITION PRESENT: [ ]mild [ ]moderate [ ]severe [ ]underweight [ ]morbid obesity  https://www.andeal.org/vault/2440/web/files/ONC/Table_Clinical%20Characteristics%20to%20Document%20Malnutrition-White%20JV%20et%20al%202012.pdf    Height (cm): 170.2 (06-01-23 @ 09:07), 180.3 (10-04-22 @ 22:39)  Weight (kg): 93.3 (06-05-23 @ 17:10), 66.4 (10-04-22 @ 22:39)  BMI (kg/m2): 32.2 (06-05-23 @ 17:10), 22.9 (06-01-23 @ 09:07), 20.4 (10-04-22 @ 22:39)    [ ]PPSV2 < or = to 30% [ ]significant weight loss  [ ]poor nutritional intake  [ ]anasarca      [ ]Artificial Nutrition      Patient and/or family assessed for spiritual and social needs     REFERRALS:   [ ]Chaplaincy  [ ]Hospice  [ ]Child Life  [x ]Social Work  [ ]Case management [ ]Holistic Therapy     Patient discussed with primary medical team MD  Palliative care education provided to patient and/or family    Goals of Care Document:

## 2023-06-23 NOTE — PROGRESS NOTE ADULT - PROBLEM SELECTOR PROBLEM 3
History of intracranial hemorrhage

## 2023-06-23 NOTE — PROGRESS NOTE ADULT - PROBLEM SELECTOR PROBLEM 5
Pain
Palliative care by specialist
Pain
Palliative care by specialist
Pain
Palliative care by specialist

## 2023-06-23 NOTE — PROGRESS NOTE ADULT - PROBLEM SELECTOR PLAN 3
In 2021. S/p trach/PEG  -management of vent per primary team  -continue PEG feeds.
In 2021. S/p trach/PEG  -management of vent per primary team  -continue PEG feeds  -GOC discussion tomorrow
In 2021. S/p trach/PEG  -management of vent per primary team  -continue PEG feeds  -GOC discussions
In 2021. S/p trach/PEG  -management of vent per primary team  -continue PEG feeds  -GOC discussions
In 2021. S/p trach/PEG  -management of vent per primary team  -continue PEG feeds.
In 2021. S/p trach/PEG  -management of vent per primary team  -continue PEG feeds  -GOC discussion ongoing

## 2023-06-23 NOTE — PROGRESS NOTE ADULT - PROBLEM SELECTOR PLAN 5
-DNR  -ongoing medical management  -GOC - left message with wife
-DNR  -ongoing medical management  -family wishes for dialysis  -GOC as appropriate
No evidence of pain on exam today, but some evidence of pain per primary team/family  -continue PRN tylenol
-DNR  -ongoing medical management  -family wishes for dialysis  -GOC as appropriate
No evidence of pain on exam today, but some evidence of pain per primary team/family  -continue PRN tylenol  -if nonverbal evidence of pain and no improvement with tylenol, can consider oxycodone 2.5mg q6h PRN for pain
No evidence of pain on exam today, but some evidence of pain per primary team/family  -continue PRN tylenol

## 2023-06-23 NOTE — PROGRESS NOTE ADULT - ASSESSMENT
64 yo M with  PMH of ICH (2021) s/p trach and PEG, HTN, HLD, T2DM, CAD s/p stents, Recurrent C diff, BIBEMS from [facility] for abnormal labs.       MICHELLE on CKD 3a - severe azotemia  likely due to GIB / hypotension  Started HD on 6/1/23  Acute anemia d/t UGIB  Troponemia  Lactic acidosis resolved   DKA resolved   Afib     -   HD today / will assess daily needs   - GOC discussion ongoing  -  continue bumex , document accurate UO please    -  feed : gurpreetro k noted / lokelma 10 q   12   - repeat h/h / last sat elevated   - please replete ph / need IP > 2   - bladder scan q shift  - very poor prognosis   will follow

## 2023-06-23 NOTE — PROGRESS NOTE ADULT - PROBLEM SELECTOR PLAN 2
In the setting of UTI  -s/p vanc and cefepime  -on fluconazole
In the setting of UTI  -continue cefepime  -f/u workup
In the setting of UTI. High risk  -s/p vanc and cefepime  -ceftolozane/tazobactam IVPB 450 milliGRAM(s) IV Intermittent every 8 hours  -on fluconazole
In the setting of UTI  -continue Diflucan  -f/u workup
In the setting of UTI. High risk  -s/p vanc and cefepime
In the setting of UTI. High risk  -s/p vanc and cefepime

## 2023-06-23 NOTE — PROGRESS NOTE ADULT - ASSESSMENT
62 yo M with  PMH of ICH (2021) s/p trach and PEG, HTN, HLD, T2DM, CAD s/p stents, Recurrent C diff, BIBEMS from [facility] for abnormal labs. Patient non-verbal at baseline - limited history. Per NH chart  have a BUN greater than 200 and creatinine of 4.3 on outpatient labs. Outpatient CXR also w/ new L sided pleural effusion. In ED, patient was hypotensive with Afib with RVR, found to be in acure renal failure, anemic with elevated trops. He was started on Amiodarone GTT, PPI GTT, gastric lavage with coffee ground secretions, started on broad spectrum abx and admitted to MICU  While in MICU, course complicated by DKA, s/p insulin drip, MICHELLE started on HD, acute anemia s/p PRBC transfusion, NSTEMI, downgraded to SDU -> floor    Acute Renal Failure, started on HD 6/1  Fluid Overload  HAGMA  - non oliguric, RBUS without hydro   - started on HD 6/1 via [facility] [place]   - Cr much improved s/p HD yesterday, now 1.9  - Nephro on board- HD as per renal  - continue bumex , document accurate UO   - c/w midodrine 10mg q8hr  - c/w sodium bicarb 1300mg PO TID  -  TTE 6/1 - EF 63%, GIDD   - 6/21: Discussed with the wife about GOC, possible CMO vs Hospice care, Palliative on board- She will update medical team by Friday after she talks to her Children. Scheduled a palliative meeting 6/23 at 1:00 pm    Fever, suspected VAP-improved  Candiduria with U cx C tropicalis  -  CXR Further significant increase in left-sided opacities/effusion with minimal aeration of the left lung. Right-sided opacities redemonstrated    6/11 BCX NGTD     6/11 UCX   >100,000 CFU/ml Pseudomonas aeruginosa    6/11 sputum   Three or more mixed gram negative rods including    Moderate Pseudomonas aeruginosa (Carbapenem Resistant)    6/7 BCX NGTD   - ID consult 6/14: Recommended switching gentamicin to ceftolozane-tazobactam 2.25g IV x1, followed by 450mg IV q8h (HD dosing) for the treatment of pneumonia and UTI due to carbapenem-resistant Pseudomonas aeruginosa x 7 days - ended   - fluconazole, end 6/15    Diarrhea 6/20  - Rectal tube in place to avoid contact with sacral pressure ulccers  - CDiff NG 6/22    Acute anemia  - HgB stable (baseline 8)   - evaluated by GI - no evidence of GI bleed (PEG tube flushed with return of bile and no blood)  - Monitor H/H closely  - Active T+S  - sp multiple units on this hospitalization    Hyponatremia, likely hypervolemic-resolved  - c/w bumex 2mg iv q12hr and HD per nephro  - daily BMP    Afib w/ RVR - now rate controlled   - currently rate controlled s/p amio gtt  - changed amio to 200 q24H   - started [id number] mg daily as he went back into afib rvr   - now rate controlled    Acute Decompensated CHF  CAD s/p stents  NSTEMI  - Trops 1.80-->1.63-->1.57-->1.55  - seen by cardio 5/31,  c/w asa and statin, medical management for NSTEMI, CAD, c/w Diuresis    DKA, resolved  - s/p insulin drip, now on basal/bolus and tube feeds. Monitor FS     Hx ICH s/p trach and PEG  Bedbound from NH   - c/w keppra 500mg BID for seizure prophylaxis    Unstageable sacral ulcer  - Burn recs in    DVT Ppx: HSQ  GI Ppx: Pantoprazole 40mg BID  Diet: Peg tube  Activity: bedrest

## 2023-06-23 NOTE — PROGRESS NOTE ADULT - ASSESSMENT
62 yo M with  PMH of ICH (2021) s/p trach and PEG, HTN, HLD, T2DM, CAD s/p stents, Recurrent C diff, BIBEMS from [facility] for abnormal labs. Patient non-verbal at baseline - limited history. Per NH chart  have a BUN greater than 200 and creatinine of 4.3 on outpatient labs. Outpatient CXR also w/ new L sided pleural effusion. With EMS patient was also found to be in irregular rhythm, no known history of A-fib or a flutter. Palliative consulted for GOC    Patient seen at bedside. Patient did not appear uncomfortable. Spoke to wife, daughter, and niece at patients bedside regarding GOC going forward. Patient already DNR, trach+PEG and on mechanical ventilation. Has previously failed TOB off ventilator. Explained CMO and hospice to family including a shift in focus to comfort and symptom managed care. They said they would speak amongst themselves as they are unable to come to a decision at this time. Explained that they do not need to come to any decisions at this time. They were waiting for attending to come by and give medical update.    Will continue to follow.

## 2023-06-23 NOTE — PROGRESS NOTE ADULT - PROBLEM SELECTOR PLAN 4
s/p multiple units, requiring blood transfusion. s/p gastric lavage. HGb improved  -trend Hgb  -active T+S
s/p 2u PRBC/ s/p gastric lavage. HGb improved  -trend Hgb  -GI: no evidence of GI bleed  -active T+S
s/p multiple units, requiring blood transfusion. s/p gastric lavage. HGb improved  -trend Hgb  -active T+S
s/p 2u PRBC/ s/p gastric lavage. HGb improved  -trend Hgb  -GI: no evidence of GI bleed  -active T+S
s/p multiple units, requiring blood transfusion. s/p gastric lavage. HGb improved  -trend Hgb  -active T+S
s/p multiple units, requiring blood transfusion. s/p gastric lavage. HGb improved  -trend Hgb  -active T+S

## 2023-06-23 NOTE — PROGRESS NOTE ADULT - SUBJECTIVE AND OBJECTIVE BOX
Nephrology progress note    Patient was seen and examined, events over the last 24 h noted .    Allergies:  penicillin (Other)    Hospital Medications:   MEDICATIONS  (STANDING):  acetylcysteine 20%  Inhalation 4 milliLiter(s) Inhalation every 6 hours  albuterol/ipratropium for Nebulization 3 milliLiter(s) Nebulizer every 6 hours  aMIOdarone    Tablet 200 milliGRAM(s) Oral daily  aMIOdarone    Tablet   Oral   aspirin  chewable 81 milliGRAM(s) Oral daily  atorvastatin 80 milliGRAM(s) Oral at bedtime  buMETAnide Injectable 2 milliGRAM(s) IV Push every 12 hours  chlorhexidine 0.12% Liquid 15 milliLiter(s) Oral Mucosa two times a day  chlorhexidine 2% Cloths 1 Application(s) Topical <User Schedule>  collagenase Ointment 1 Application(s) Topical two times a day  dextrose 5%. 1000 milliLiter(s) (50 mL/Hr) IV Continuous <Continuous>  dextrose 5%. 1000 milliLiter(s) (100 mL/Hr) IV Continuous <Continuous>  dextrose 50% Injectable 12.5 Gram(s) IV Push once  dextrose 50% Injectable 25 Gram(s) IV Push once  dextrose 50% Injectable 25 Gram(s) IV Push once  diltiazem    milliGRAM(s) Oral daily  glucagon  Injectable 1 milliGRAM(s) IntraMuscular once  heparin   Injectable 5000 Unit(s) SubCutaneous every 12 hours  insulin glargine Injectable (LANTUS) 10 Unit(s) SubCutaneous at bedtime  insulin lispro (ADMELOG) corrective regimen sliding scale   SubCutaneous three times a day before meals  levETIRAcetam  Solution 500 milliGRAM(s) Oral two times a day  pantoprazole  Injectable 40 milliGRAM(s) IV Push every 12 hours  silver sulfADIAZINE 1% Cream 1 Application(s) Topical every 12 hours  sodium phosphate 30 milliMole(s)/500 mL IVPB 30 milliMole(s) IV Intermittent once  sodium zirconium cyclosilicate 10 Gram(s) Oral every 12 hours        VITALS:  T(F): 97.9 (06-22-23 @ 20:40), Max: 97.9 (06-22-23 @ 20:40)  HR: 89 (06-23-23 @ 08:25)  BP: 121/64 (06-23-23 @ 08:07)  RR: --  SpO2: 99% (06-23-23 @ 08:25)  Wt(kg): --    06-21 @ 07:01  -  06-22 @ 07:00  --------------------------------------------------------  IN: 870 mL / OUT: 0 mL / NET: 870 mL    06-22 @ 07:01  -  06-23 @ 07:00  --------------------------------------------------------  IN: 870 mL / OUT: 0 mL / NET: 870 mL    06-23 @ 07:01  -  06-23 @ 13:10  --------------------------------------------------------  IN: 405 mL / OUT: 0 mL / NET: 405 mL          PHYSICAL EXAM:  Constitutional: NAD  HEENT: anicteric sclera, oropharynx clear, MMM  Neck: No JVD  Respiratory: CTAB, no wheezes, rales or rhonchi  Cardiovascular: S1, S2, RRR  Gastrointestinal: BS+, soft, NT/ND  Extremities: No cyanosis or clubbing. No peripheral edema  :  No teixeira.   Skin: No rashes    LABS:  06-23    135  |  97<L>  |  58<H>  ----------------------------<  153<H>  5.3<H>   |  26  |  2.7<H>    Ca    8.7      23 Jun 2023 08:28  Phos  2.2     06-23  Mg     2.1     06-23    TPro  5.4<L>  /  Alb  1.9<L>  /  TBili  0.2  /  DBili      /  AST  25  /  ALT  21  /  AlkPhos  246<H>  06-23                          7.6    11.87 )-----------( 671      ( 23 Jun 2023 08:28 )             24.2       Urine Studies:  Urinalysis Basic - ( 23 Jun 2023 08:28 )    Color:  / Appearance:  / SG:  / pH:   Gluc: 153 mg/dL / Ketone:   / Bili:  / Urobili:    Blood:  / Protein:  / Nitrite:    Leuk Esterase:  / RBC:  / WBC    Sq Epi:  / Non Sq Epi:  / Bacteria:         RADIOLOGY & ADDITIONAL STUDIES:

## 2023-06-24 NOTE — PROGRESS NOTE ADULT - SUBJECTIVE AND OBJECTIVE BOX
Over Night Events: events noted, vent dependant, afebrile    PHYSICAL EXAM    ICU Vital Signs Last 24 Hrs  T(C): 36.7 (23 Jun 2023 20:50), Max: 36.7 (23 Jun 2023 20:50)  T(F): 98 (23 Jun 2023 20:50), Max: 98 (23 Jun 2023 20:50)  HR: 73 (23 Jun 2023 20:50) (73 - 90)  BP: 101/61 (23 Jun 2023 20:50) (101/61 - 143/65)  RR: 20 (23 Jun 2023 16:50) (20 - 20)  SpO2: 100% (23 Jun 2023 20:50) (99% - 100%)    O2 Parameters below as of 23 Jun 2023 20:50  Patient On (Oxygen Delivery Method): ventilator            General: ill looking  HEENT: trach  Lungs: dec bs both bases  Cardiovascular: Regular   Abdomen: Soft, Positive BS  not following commands      06-23-23 @ 07:01  -  06-24-23 @ 07:00  --------------------------------------------------------  IN:    Enteral Tube Flush: 30 mL    Peptamen A.F.: 375 mL  Total IN: 405 mL    OUT:    Other (mL): 2000 mL  Total OUT: 2000 mL    Total NET: -1595 mL          LABS:                          7.6    11.87 )-----------( 671      ( 23 Jun 2023 08:28 )             24.2                                               06-23    135  |  97<L>  |  58<H>  ----------------------------<  153<H>  5.3<H>   |  26  |  2.7<H>    Ca    8.7      23 Jun 2023 08:28  Phos  2.2     06-23  Mg     2.1     06-23    TPro  5.4<L>  /  Alb  1.9<L>  /  TBili  0.2  /  DBili  x   /  AST  25  /  ALT  21  /  AlkPhos  246<H>  06-23                                             Urinalysis Basic - ( 23 Jun 2023 08:28 )    Color: x / Appearance: x / SG: x / pH: x  Gluc: 153 mg/dL / Ketone: x  / Bili: x / Urobili: x   Blood: x / Protein: x / Nitrite: x   Leuk Esterase: x / RBC: x / WBC x   Sq Epi: x / Non Sq Epi: x / Bacteria: x                                                  LIVER FUNCTIONS - ( 23 Jun 2023 08:28 )  Alb: 1.9 g/dL / Pro: 5.4 g/dL / ALK PHOS: 246 U/L / ALT: 21 U/L / AST: 25 U/L / GGT: x                                                                                               Mode: AC/ CMV (Assist Control/ Continuous Mandatory Ventilation)  RR (machine): 14  TV (machine): 450  FiO2: 40  PEEP: 8  ITime: 1  MAP: 12  PIP: 29                                          MEDICATIONS  (STANDING):  acetylcysteine 20%  Inhalation 4 milliLiter(s) Inhalation every 6 hours  albuterol/ipratropium for Nebulization 3 milliLiter(s) Nebulizer every 6 hours  aMIOdarone    Tablet   Oral   aMIOdarone    Tablet 200 milliGRAM(s) Oral daily  aspirin  chewable 81 milliGRAM(s) Oral daily  atorvastatin 80 milliGRAM(s) Oral at bedtime  buMETAnide Injectable 2 milliGRAM(s) IV Push every 12 hours  chlorhexidine 0.12% Liquid 15 milliLiter(s) Oral Mucosa two times a day  chlorhexidine 2% Cloths 1 Application(s) Topical <User Schedule>  collagenase Ointment 1 Application(s) Topical two times a day  dextrose 5%. 1000 milliLiter(s) (100 mL/Hr) IV Continuous <Continuous>  dextrose 5%. 1000 milliLiter(s) (50 mL/Hr) IV Continuous <Continuous>  dextrose 50% Injectable 25 Gram(s) IV Push once  dextrose 50% Injectable 25 Gram(s) IV Push once  dextrose 50% Injectable 12.5 Gram(s) IV Push once  diltiazem    milliGRAM(s) Oral daily  glucagon  Injectable 1 milliGRAM(s) IntraMuscular once  heparin   Injectable 5000 Unit(s) SubCutaneous every 12 hours  insulin glargine Injectable (LANTUS) 10 Unit(s) SubCutaneous at bedtime  insulin lispro (ADMELOG) corrective regimen sliding scale   SubCutaneous three times a day before meals  levETIRAcetam  Solution 500 milliGRAM(s) Oral two times a day  pantoprazole  Injectable 40 milliGRAM(s) IV Push every 12 hours  silver sulfADIAZINE 1% Cream 1 Application(s) Topical every 12 hours  sodium phosphate 30 milliMole(s)/500 mL IVPB 30 milliMole(s) IV Intermittent once  sodium zirconium cyclosilicate 10 Gram(s) Oral every 12 hours    MEDICATIONS  (PRN):  acetaminophen     Tablet .. 650 milliGRAM(s) Oral every 6 hours PRN Temp greater or equal to 38C (100.4F), Mild Pain (1 - 3)  dextrose Oral Gel 15 Gram(s) Oral once PRN Blood Glucose LESS THAN 70 milliGRAM(s)/deciliter  midodrine. 2.5 milliGRAM(s) Oral three times a day PRN SBP < 110  sodium chloride 0.9% Bolus. 100 milliLiter(s) IV Bolus every 5 minutes PRN SBP LESS THAN or EQUAL to 100 mmHg  sodium chloride 0.9% Bolus. 100 milliLiter(s) IV Bolus every 5 minutes PRN SBP LESS THAN or EQUAL to 80 mmHg

## 2023-06-24 NOTE — PROGRESS NOTE ADULT - SUBJECTIVE AND OBJECTIVE BOX
seen and examined  24 h events noted   trach/vent         PAST HISTORY  --------------------------------------------------------------------------------  No significant changes to PMH, PSH, FHx, SHx, unless otherwise noted    ALLERGIES & MEDICATIONS  --------------------------------------------------------------------------------  Allergies    penicillin (Other)    Intolerances      Standing Inpatient Medications  acetylcysteine 20%  Inhalation 4 milliLiter(s) Inhalation every 6 hours  albuterol/ipratropium for Nebulization 3 milliLiter(s) Nebulizer every 6 hours  aMIOdarone    Tablet   Oral   aMIOdarone    Tablet 200 milliGRAM(s) Oral daily  aspirin  chewable 81 milliGRAM(s) Oral daily  atorvastatin 80 milliGRAM(s) Oral at bedtime  buMETAnide Injectable 2 milliGRAM(s) IV Push every 12 hours  chlorhexidine 0.12% Liquid 15 milliLiter(s) Oral Mucosa two times a day  chlorhexidine 2% Cloths 1 Application(s) Topical <User Schedule>  collagenase Ointment 1 Application(s) Topical two times a day  dextrose 5%. 1000 milliLiter(s) IV Continuous <Continuous>  dextrose 5%. 1000 milliLiter(s) IV Continuous <Continuous>  dextrose 50% Injectable 25 Gram(s) IV Push once  dextrose 50% Injectable 25 Gram(s) IV Push once  dextrose 50% Injectable 12.5 Gram(s) IV Push once  diltiazem    milliGRAM(s) Oral daily  glucagon  Injectable 1 milliGRAM(s) IntraMuscular once  heparin   Injectable 5000 Unit(s) SubCutaneous every 12 hours  insulin glargine Injectable (LANTUS) 10 Unit(s) SubCutaneous at bedtime  insulin lispro (ADMELOG) corrective regimen sliding scale   SubCutaneous three times a day before meals  levETIRAcetam  Solution 500 milliGRAM(s) Oral two times a day  pantoprazole  Injectable 40 milliGRAM(s) IV Push every 12 hours  silver sulfADIAZINE 1% Cream 1 Application(s) Topical every 12 hours  sodium phosphate 30 milliMole(s)/500 mL IVPB 30 milliMole(s) IV Intermittent once  sodium zirconium cyclosilicate 10 Gram(s) Oral every 12 hours    PRN Inpatient Medications  acetaminophen     Tablet .. 650 milliGRAM(s) Oral every 6 hours PRN  dextrose Oral Gel 15 Gram(s) Oral once PRN  midodrine. 2.5 milliGRAM(s) Oral three times a day PRN  sodium chloride 0.9% Bolus. 100 milliLiter(s) IV Bolus every 5 minutes PRN  sodium chloride 0.9% Bolus. 100 milliLiter(s) IV Bolus every 5 minutes PRN        VITALS/PHYSICAL EXAM  --------------------------------------------------------------------------------  T(C): 36.7 (06-23-23 @ 20:50), Max: 36.7 (06-23-23 @ 20:50)  HR: 73 (06-23-23 @ 20:50) (73 - 90)  BP: 101/61 (06-23-23 @ 20:50) (101/61 - 143/65)  RR: 20 (06-23-23 @ 16:50) (20 - 20)  SpO2: 100% (06-23-23 @ 20:50) (99% - 100%)  Wt(kg): --        06-23-23 @ 07:01  -  06-24-23 @ 07:00  --------------------------------------------------------  IN: 405 mL / OUT: 2000 mL / NET: -1595 mL      Physical Exam:  	Gen: trach/vent   	Pulm: B/L aline   	CV: S1S2; no rub  	Abd: +distended  	Vascular access:    LABS/STUDIES  --------------------------------------------------------------------------------              7.6    11.87 >-----------<  671      [06-23-23 @ 08:28]              24.2     135  |  97  |  58  ----------------------------<  153      [06-23-23 @ 08:28]  5.3   |  26  |  2.7        Ca     8.7     [06-23-23 @ 08:28]      Mg     2.1     [06-23-23 @ 08:28]      Phos  2.2     [06-23-23 @ 08:28]    TPro  5.4  /  Alb  1.9  /  TBili  0.2  /  DBili  x   /  AST  25  /  ALT  21  /  AlkPhos  246  [06-23-23 @ 08:28]        Creatinine Trend:  SCr 2.7 [06-23 @ 08:28]  SCr 2.3 [06-22 @ 07:39]  SCr 2.1 [06-21 @ 16:32]  SCr 1.9 [06-20 @ 18:51]  SCr 2.8 [06-20 @ 08:46]    Urinalysis - [06-23-23 @ 08:28]      Color  / Appearance  / SG  / pH       Gluc 153 / Ketone   / Bili  / Urobili        Blood  / Protein  / Leuk Est  / Nitrite       RBC  / WBC  / Hyaline  / Gran  / Sq Epi  / Non Sq Epi  / Bacteria       Iron 51, TIBC 111, %sat 46      [06-06-23 @ 10:40]  Ferritin 1956      [06-06-23 @ 10:40]  Vitamin D (25OH) 70      [06-03-23 @ 06:50]    HBsAb <3.0      [06-01-23 @ 22:50]  HBsAb Nonreact      [06-01-23 @ 22:50]  HBsAg Nonreact      [06-01-23 @ 22:50]  HBcAb Nonreact      [06-01-23 @ 22:50]  HIV Nonreact      [06-08-23 @ 16:00]  HIV Nonreact      [06-08-23 @ 12:20]

## 2023-06-24 NOTE — PROGRESS NOTE ADULT - ASSESSMENT
64 yo M with  PMH of ICH (2021) s/p trach and PEG, HTN, HLD, T2DM, CAD s/p stents, Recurrent C diff, BIBEMS from [facility] for abnormal labs.     MICHELLE on CKD 3a - severe azotemia  likely due to GIB / hypotension  Started HD on 6/1/23  Acute anemia d/t UGIB  Troponemia  Lactic acidosis resolved   DKA resolved   Afib   -   s/p hd yesterday   - ph improved   - GOC discussion ongoing  -  continue bumex , document accurate UO please    -  feed : blake k keshawn / lokelma 10 q   12   - repeat h/h / last sat elevated   - very poor prognosis   will follow

## 2023-06-24 NOTE — PROGRESS NOTE ADULT - SUBJECTIVE AND OBJECTIVE BOX
ALICIA BARBOUR  63y  [employer] 3A 030 A      Patient is a 63y old  Male who presents with a chief complaint of Abnormal labs (24 Jun 2023 09:30)      INTERVAL HPI/OVERNIGHT EVENTS:    Patient is not interactive but looks comfortable   no overnight events      FAMILY HISTORY:    T(C): 36.7 (06-23-23 @ 20:50), Max: 36.7 (06-23-23 @ 20:50)  HR: 79 (06-24-23 @ 09:00) (73 - 79)  BP: 101/61 (06-23-23 @ 20:50) (101/61 - 113/56)  RR: 20 (06-23-23 @ 16:50) (20 - 20)  SpO2: 100% (06-24-23 @ 09:00) (100% - 100%)  Wt(kg): --Vital Signs Last 24 Hrs  T(C): 36.7 (23 Jun 2023 20:50), Max: 36.7 (23 Jun 2023 20:50)  T(F): 98 (23 Jun 2023 20:50), Max: 98 (23 Jun 2023 20:50)  HR: 79 (24 Jun 2023 09:00) (73 - 79)  BP: 101/61 (23 Jun 2023 20:50) (101/61 - 113/56)  BP(mean): --  RR: 20 (23 Jun 2023 16:50) (20 - 20)  SpO2: 100% (24 Jun 2023 09:00) (100% - 100%)    Parameters below as of 23 Jun 2023 20:50  Patient On (Oxygen Delivery Method): ventilator        PHYSICAL EXAM:  GENERAL: Not interactive   PULM: Clear to auscultation bilaterally, rhonchi noted   CARDIAC: Regular rate and rhythm;  GI: Soft, Nontender, Nondistended; Bowel sounds present  EXTREMITIES:  2+ Peripheral Pulses,     Consultant(s) Notes Reviewed:  [x ] YES  [ ] NO  Care Discussed with Consultants/Other Providers [ x] YES  [ ] NO    LABS:                            7.6    11.87 )-----------( 671      ( 23 Jun 2023 08:28 )             24.2   06-23    135  |  97<L>  |  58<H>  ----------------------------<  153<H>  5.3<H>   |  26  |  2.7<H>    Ca    8.7      23 Jun 2023 08:28  Phos  2.2     06-23  Mg     2.1     06-23    TPro  5.4<L>  /  Alb  1.9<L>  /  TBili  0.2  /  DBili  x   /  AST  25  /  ALT  21  /  AlkPhos  246<H>  06-23            acetaminophen     Tablet .. 650 milliGRAM(s) Oral every 6 hours PRN  acetylcysteine 20%  Inhalation 4 milliLiter(s) Inhalation every 6 hours  albuterol/ipratropium for Nebulization 3 milliLiter(s) Nebulizer every 6 hours  aMIOdarone    Tablet   Oral   aMIOdarone    Tablet 200 milliGRAM(s) Oral daily  aspirin  chewable 81 milliGRAM(s) Oral daily  atorvastatin 80 milliGRAM(s) Oral at bedtime  buMETAnide Injectable 2 milliGRAM(s) IV Push every 12 hours  chlorhexidine 0.12% Liquid 15 milliLiter(s) Oral Mucosa two times a day  chlorhexidine 2% Cloths 1 Application(s) Topical <User Schedule>  collagenase Ointment 1 Application(s) Topical two times a day  dextrose 5%. 1000 milliLiter(s) IV Continuous <Continuous>  dextrose 5%. 1000 milliLiter(s) IV Continuous <Continuous>  dextrose 50% Injectable 12.5 Gram(s) IV Push once  dextrose 50% Injectable 25 Gram(s) IV Push once  dextrose 50% Injectable 25 Gram(s) IV Push once  dextrose Oral Gel 15 Gram(s) Oral once PRN  diltiazem    milliGRAM(s) Oral daily  glucagon  Injectable 1 milliGRAM(s) IntraMuscular once  heparin   Injectable 5000 Unit(s) SubCutaneous every 12 hours  insulin glargine Injectable (LANTUS) 10 Unit(s) SubCutaneous at bedtime  insulin lispro (ADMELOG) corrective regimen sliding scale   SubCutaneous three times a day before meals  levETIRAcetam  Solution 500 milliGRAM(s) Oral two times a day  midodrine. 2.5 milliGRAM(s) Oral three times a day PRN  pantoprazole  Injectable 40 milliGRAM(s) IV Push every 12 hours  silver sulfADIAZINE 1% Cream 1 Application(s) Topical every 12 hours  sodium chloride 0.9% Bolus. 100 milliLiter(s) IV Bolus every 5 minutes PRN  sodium chloride 0.9% Bolus. 100 milliLiter(s) IV Bolus every 5 minutes PRN  sodium phosphate 30 milliMole(s)/500 mL IVPB 30 milliMole(s) IV Intermittent once    64 yo M with  PMH of ICH (2021) s/p trach and PEG, HTN, HLD, T2DM, CAD s/p stents, Recurrent C diff, BIBEMS from [facility] for abnormal labs. Patient non-verbal at baseline - limited history. Per NH chart  have a BUN greater than 200 and creatinine of 4.3 on outpatient labs. Outpatient CXR also w/ new L sided pleural effusion. In ED, patient was hypotensive with Afib with RVR, found to be in acure renal failure, anemic with elevated trops. He was started on Amiodarone GTT, PPI GTT, gastric lavage with coffee ground secretions, started on broad spectrum abx and admitted to MICU  While in MICU, course complicated by DKA, s/p insulin drip, MICHELLE started on HD, acute anemia s/p PRBC transfusion, NSTEMI, downgraded to SDU -> floor    1. Acute Renal Failure, started on HD 6/1 due to fluid overload   - non oliguric, RBUS without hydro   - started on HD 6/1 via R IJ Tasha   - Nephro on board  - continue bumex , document accurate UO   - c/w midodrine 10mg q8hr  - c/w sodium bicarb 1300mg PO TID  -  TTE 6/1 - EF 63%, GIDD     2. fever, suspected VAP-improved and Candiduria with U cx C tropicalis  -  CXR Further significant increase in left-sided opacities/effusion with minimal aeration of the left lung. Right-sided opacities redemonstrated  - 6/11 BCX NGTD   - 6/11 UCX   >100,000 CFU/ml Pseudomonas aeruginosa  - 6/11 sputum   Three or more mixed gram negative rods including Moderate Pseudomonas aeruginosa (Carbapenem Resistant)  - 6/7 BCX NGTD   - ID consult 6/14: Recommended switching gentamicin to ceftolozane-tazobactam 2.25g IV x1, followed by 450mg IV q8h (HD dosing) for the treatment of pneumonia and UTI due to carbapenem-resistant Pseudomonas aeruginosa x 7 days - ended   - fluconazole, end 6/15  - Palliative care consult:  a) Explained CMO and hospice to family including a shift in focus to comfort and symptom managed care. They said they would speak amongst themselves as they are unable to come to a decision at this time. Explained that they do not need to come to any decisions at this time. They were waiting for attending to come by and give medical update.      3. Acute anemia  - HgB stable (baseline 8)   - evaluated by GI - no evidence of GI bleed (PEG tube flushed with return of bile and no blood)  - Monitor H/H closely  - Active T+S  - sp multiple units on this hospitalization    4. Hyponatremia, likely hypervolemic-resolved  - c/w bumex 2mg iv q12hr and HD per nephro  - daily BMP    5. Afib w/ RVR - now rate controlled   - currently rate controlled s/p amio gtt  - changed amio to 200 q24H   - started [id number] mg daily as he went back into afib rvr   - now rate controlled    6. Acute Decompensated CHFCAD s/p stents.NSTEMI  - Trops 1.80-->1.63-->1.57-->1.55  - seen by cardio 5/31,  c/w asa and statin, medical management for NSTEMI, CAD, c/w Diuresis    7. DKA, resolved  - s/p insulin drip, now on basal/bolus and tube feeds. Monitor FS     8. Hx ICH s/p trach and PEG  Bedbound from NH   - c/w keppra 500mg BID for seizure prophylaxis    9. Unstageable sacral ulcer  - Burn recs in    DVT Ppx: HSQ  GI Ppx: Pantoprazole 40mg BID  Diet: Peg tube  Activity: bedrest    Overall Prognosis poor.  Pending : GOC with family again on Monday, as not decided yet after yest meeting.

## 2023-06-24 NOTE — PROGRESS NOTE ADULT - ASSESSMENT
IMPRESSION:    Sepsis present on admission  Candida UTI  Pseudomonas PNA sp abx  left side atelectasis improved  Bilateral pleural effusions likely volume overload   MICHELLE on CKD III on dialysis   NSTEMI likely type 2  Hyponatremia resolved   Paroxysmal Afib  Acute on chronic anemia suspected UGI Bleed  Chronic respiratory failure  H/o ICH s/p trach and PEG  H/o CAD      PLAN:    CNS: Avoid CNS depressants     HEENT: Oral and trach care    PULMONARY: HOB 45. Aspiration.  No vent changes.  Goal saturation 92-96%. Vent dependents.  Pulmonary toilet. not weanable    CARDIOVASCULAR: Avoid overload.      GI:  Feeding  Bowel regimen     RENAL: trend CMP.  Correct as needed.  Nephrology following.  HD  per renal     INFECTIOUS DISEASE: ABX per ID.      HEMATOLOGICAL: DVT prophylaxis.    Monitor CBC.        ENDOCRINE: Follow up FS. Insulin protocol if needed.    DNR    Poor prognosis

## 2023-06-25 NOTE — PROGRESS NOTE ADULT - SUBJECTIVE AND OBJECTIVE BOX
ALICIA BARBOUR  63M    Patient is a 63y old  Male who presents with a chief complaint of Abnormal labs (24 Jun 2023 09:30)    Overnight: No new events   Patient is none verbal trached on chronic ventilator     Vital Signs Last 24 Hrs  T(C): 36.6 (25 Jun 2023 14:55), Max: 37.2 (24 Jun 2023 21:00)  T(F): 97.8 (25 Jun 2023 14:55), Max: 98.9 (24 Jun 2023 21:00)  HR: 82 (25 Jun 2023 14:55) (77 - 88)  BP: 105/60 (25 Jun 2023 14:55) (100/54 - 125/67)  RR: 18 (25 Jun 2023 14:55) (18 - 20)  SpO2: 100% (25 Jun 2023 14:55) (98% - 100%)    Parameters below as of 25 Jun 2023 14:55  Patient On (Oxygen Delivery Method): ventilator    VENT SETTINGS: , RR 14 breathing at 16, FIO2 40%, PEEP 8+, Sats 100%     PHYSICAL EXAM:  GENERAL: Not interactive / Poor oral health / poor hygine w/ odor   PULM: Clear to auscultation bilaterally, rhonchi noted   CARDIAC: Regular rate and rhythm;  GI: Soft, Nontender, Nondistended; Bowel sounds present  EXTREMITIES:  2+ Peripheral Pulses,     Consultant(s) Notes Reviewed:  [x ] YES  Care Discussed with Consultants/Other Providers [ x] YES     LABS:    06-25    135  |  97<L>  |  52<H>  ----------------------------<  132<H>  4.9   |  26  |  2.4<H>    Ca    8.4      25 Jun 2023 08:10  Phos  2.0     06-25  Mg     2.6     06-25    TPro  5.2<L>  /  Alb  1.8<L>  /  TBili  0.3  /  DBili  x   /  AST  22  /  ALT  18  /  AlkPhos  215<H>  06-25                        6.6    9.84  )-----------( 617      ( 25 Jun 2023 08:10 )             21.3                           7.6    11.87 )-----------( 671      ( 23 Jun 2023 08:28 )             24.2   06-23    135  |  97<L>  |  58<H>  ----------------------------<  153<H>  5.3<H>   |  26  |  2.7<H>    Ca    8.7      23 Jun 2023 08:28  Phos  2.2     06-23  Mg     2.1     06-23    TPro  5.4<L>  /  Alb  1.9<L>  /  TBili  0.2  /  DBili  x   /  AST  25  /  ALT  21  /  AlkPhos  246<H>  06-23      MEDICATIONS  (STANDING):  acetylcysteine 20%  Inhalation 4 milliLiter(s) Inhalation every 6 hours  albuterol/ipratropium for Nebulization 3 milliLiter(s) Nebulizer every 6 hours  aMIOdarone    Tablet 200 milliGRAM(s) Oral daily  aspirin  chewable 81 milliGRAM(s) Oral daily  atorvastatin 80 milliGRAM(s) Oral at bedtime  buMETAnide Injectable 2 milliGRAM(s) IV Push every 12 hours  chlorhexidine 0.12% Liquid 15 milliLiter(s) Oral Mucosa two times a day  chlorhexidine 2% Cloths 1 Application(s) Topical <User Schedule>  collagenase Ointment 1 Application(s) Topical two times a day  dextrose 5%. 1000 milliLiter(s) (100 mL/Hr) IV Continuous <Continuous>  diltiazem    milliGRAM(s) Oral daily  glucagon  Injectable 1 milliGRAM(s) IntraMuscular once  heparin   Injectable 5000 Unit(s) SubCutaneous every 12 hours  insulin glargine Injectable (LANTUS) 10 Unit(s) SubCutaneous at bedtime  insulin lispro (ADMELOG) corrective regimen sliding scale   SubCutaneous three times a day before meals  levETIRAcetam  Solution 500 milliGRAM(s) Oral two times a day  pantoprazole  Injectable 40 milliGRAM(s) IV Push every 12 hours  silver sulfADIAZINE 1% Cream 1 Application(s) Topical every 12 hours  sodium phosphate 30 milliMole(s)/500 mL IVPB 30 milliMole(s) IV Intermittent once  sodium zirconium cyclosilicate 10 Gram(s) Oral every 12 hours    MEDICATIONS  (PRN):  acetaminophen     Tablet .. 650 milliGRAM(s) Oral every 6 hours PRN Temp greater or equal to 38C (100.4F), Mild Pain (1 - 3)  dextrose Oral Gel 15 Gram(s) Oral once PRN Blood Glucose LESS THAN 70 milliGRAM(s)/deciliter  midodrine. 2.5 milliGRAM(s) Oral three times a day PRN SBP < 110  sodium chloride 0.9% Bolus. 100 milliLiter(s) IV Bolus every 5 minutes PRN SBP LESS THAN or EQUAL to 100 mmHg  sodium chloride 0.9% Bolus. 100 milliLiter(s) IV Bolus every 5 minutes PRN SBP LESS THAN or EQUAL to 80 mmHg      ASSESSMENT AND PLAN:    62 yo M with  PMH of ICH (2021) s/p trach and PEG, HTN, HLD, T2DM, CAD s/p stents, Recurrent C diff, BIBEMS from [facility] for abnormal labs. Patient non-verbal at baseline - limited history. Per NH chart  have a BUN greater than 200 and creatinine of 4.3 on outpatient labs. Outpatient CXR also w/ new L sided pleural effusion. In ED, patient was hypotensive with Afib with RVR, found to be in acure renal failure, anemic with elevated trops. He was started on Amiodarone GTT, PPI GTT, gastric lavage with coffee ground secretions, started on broad spectrum abx and admitted to MICU  While in MICU, course complicated by DKA, s/p insulin drip, MICHELLE started on HD, acute anemia s/p PRBC transfusion, NSTEMI, downgraded to SDU -> floor    1. Acute Renal Failure, started on HD 6/1 due to fluid overload   - non oliguric, RBUS without hydro   - started on HD 6/1 via [facility] [place]   - Nephro on board  - continue bumex , document accurate UO   - c/w midodrine 10mg q8hr  - c/w sodium bicarb 1300mg PO TID  -  TTE 6/1 - EF 63%, GIDD     2. fever, suspected VAP-improved and Candiduria with U cx C tropicalis completed rx course   -  CXR Further significant increase in left-sided opacities/effusion with minimal aeration of the left lung. Right-sided opacities redemonstrated  - 6/11 BCX NGTD   - 6/11 UCX   >100,000 CFU/ml Pseudomonas aeruginosa  - 6/11 sputum   Three or more mixed gram negative rods including Moderate Pseudomonas aeruginosa (Carbapenem Resistant)  - 6/7 BCX NGTD   - ID consult 6/14: Recommended switching gentamicin to ceftolozane-tazobactam 2.25g IV x1, followed by 450mg IV q8h (HD dosing) for the treatment of pneumonia and UTI due to carbapenem-resistant Pseudomonas aeruginosa x 7 days - ended   - fluconazole, end 6/15  - Palliative care consult:  a) Explained CMO and hospice to family including a shift in focus to comfort and symptom managed care. They said they would speak amongst themselves as they are unable to come to a decision at this time. Explained that they do not need to come to any decisions at this time. They were waiting for attending to come by and give medical update.    3. Acute Anemia:   - HgB stable (baseline 8)   - evaluated by GI - no evidence of GI bleed (PEG tube flushed with return of bile and no blood)  - Monitor H/H closely  - Active T+S  - sp multiple units on this hospitalization  - Transfuse 1 PRBC today 6/25/23    4. Hyponatremia, likely hypervolemic-resolved  - c/w bumex 2mg iv q12hr and HD per nephro  - daily BMP    5. Afib w/ RVR - now rate controlled   - currently rate controlled s/p amio gtt  - changed amio to 200 q24H   - started [id number] mg daily as he went back into afib rvr   - now rate controlled    6. Acute Decompensated CHFCAD s/p stents.NSTEMI  - Trops 1.80-->1.63-->1.57-->1.55  - seen by cardio 5/31,  c/w asa and statin, medical management for NSTEMI, CAD, c/w Diuresis    7. DKA, resolved  - s/p insulin drip, now on basal/bolus and tube feeds. Monitor FS     8. Hx ICH s/p trach and PEG  - Bedbound from NH   - c/w keppra 500mg BID for seizure prophylaxis    9. Unstageable sacral ulcer  - Burn recs in    DVT Ppx: HSQ  GI Ppx: Pantoprazole 40mg BID  Diet: Peg tube  Activity: bedrest    Poor Prognosis   Further GOC with Family and Palliative/Pulm/Medicine on Monday     Dispo: Acute / Poor Prognosis

## 2023-06-25 NOTE — PROGRESS NOTE ADULT - ASSESSMENT
2 yo M with  PMH of ICH (2021) s/p trach and PEG, HTN, HLD, T2DM, CAD s/p stents, Recurrent C diff, BIBEMS from [facility] for abnormal labs.     MICHELLE on CKD 3a - severe azotemia  likely due to GIB / hypotension  Started HD on 6/1/23  Acute anemia d/t UGIB  Troponemia  Lactic acidosis resolved   DKA resolved   Afib   -   s/p hd fri, reeval for HD tomorrow   - ph improved   - GOC discussion ongoing  -  continue bumex , document UOP   -  feed : blake liao / lokelma 10 q   12   - Hgb quite low today drop from yesterday  - very poor prognosis   will follow

## 2023-06-25 NOTE — PROGRESS NOTE ADULT - SUBJECTIVE AND OBJECTIVE BOX
Over Night Events: events noted, vent dependant, afebrile, renal reviewed    PHYSICAL EXAM    ICU Vital Signs Last 24 Hrs  T(C): 36.8 (25 Jun 2023 05:00), Max: 37.2 (24 Jun 2023 21:00)  T(F): 98.2 (25 Jun 2023 05:00), Max: 98.9 (24 Jun 2023 21:00)  HR: 78 (25 Jun 2023 06:07) (78 - 88)  BP: 110/82 (25 Jun 2023 05:00) (100/54 - 110/82)  RR: 20 (25 Jun 2023 05:00) (20 - 20)  SpO2: 99% (25 Jun 2023 06:07) (99% - 100%)        General: ill looking  HEENT: trach          Lungs: dec bs both bases  Cardiovascular: Regular   Abdomen: Soft, Positive BS  not following commands      06-24-23 @ 07:01  -  06-25-23 @ 07:00  --------------------------------------------------------  IN:    Enteral Tube Flush: 350 mL    Peptamen A.F.: 750 mL  Total IN: 1100 mL    OUT:  Total OUT: 0 mL    Total NET: 1100 mL          LABS:                          7.6    11.87 )-----------( 671      ( 23 Jun 2023 08:28 )             24.2                                               06-23    135  |  97<L>  |  58<H>  ----------------------------<  153<H>  5.3<H>   |  26  |  2.7<H>    Ca    8.7      23 Jun 2023 08:28  Phos  2.2     06-23  Mg     2.1     06-23    TPro  5.4<L>  /  Alb  1.9<L>  /  TBili  0.2  /  DBili  x   /  AST  25  /  ALT  21  /  AlkPhos  246<H>  06-23                                             Urinalysis Basic - ( 23 Jun 2023 08:28 )    Color: x / Appearance: x / SG: x / pH: x  Gluc: 153 mg/dL / Ketone: x  / Bili: x / Urobili: x   Blood: x / Protein: x / Nitrite: x   Leuk Esterase: x / RBC: x / WBC x   Sq Epi: x / Non Sq Epi: x / Bacteria: x                                                  LIVER FUNCTIONS - ( 23 Jun 2023 08:28 )  Alb: 1.9 g/dL / Pro: 5.4 g/dL / ALK PHOS: 246 U/L / ALT: 21 U/L / AST: 25 U/L / GGT: x                                                                                               Mode: AC/ CMV (Assist Control/ Continuous Mandatory Ventilation)  RR (machine): 14  TV (machine): 450  FiO2: 40  PEEP: 8  ITime: 0.8  MAP: 12  PIP: 28                                          MEDICATIONS  (STANDING):  acetylcysteine 20%  Inhalation 4 milliLiter(s) Inhalation every 6 hours  albuterol/ipratropium for Nebulization 3 milliLiter(s) Nebulizer every 6 hours  aMIOdarone    Tablet   Oral   aMIOdarone    Tablet 200 milliGRAM(s) Oral daily  aspirin  chewable 81 milliGRAM(s) Oral daily  atorvastatin 80 milliGRAM(s) Oral at bedtime  buMETAnide Injectable 2 milliGRAM(s) IV Push every 12 hours  chlorhexidine 0.12% Liquid 15 milliLiter(s) Oral Mucosa two times a day  chlorhexidine 2% Cloths 1 Application(s) Topical <User Schedule>  collagenase Ointment 1 Application(s) Topical two times a day  dextrose 5%. 1000 milliLiter(s) (50 mL/Hr) IV Continuous <Continuous>  dextrose 5%. 1000 milliLiter(s) (100 mL/Hr) IV Continuous <Continuous>  dextrose 50% Injectable 25 Gram(s) IV Push once  dextrose 50% Injectable 25 Gram(s) IV Push once  dextrose 50% Injectable 12.5 Gram(s) IV Push once  diltiazem    milliGRAM(s) Oral daily  glucagon  Injectable 1 milliGRAM(s) IntraMuscular once  heparin   Injectable 5000 Unit(s) SubCutaneous every 12 hours  insulin glargine Injectable (LANTUS) 10 Unit(s) SubCutaneous at bedtime  insulin lispro (ADMELOG) corrective regimen sliding scale   SubCutaneous three times a day before meals  levETIRAcetam  Solution 500 milliGRAM(s) Oral two times a day  pantoprazole  Injectable 40 milliGRAM(s) IV Push every 12 hours  silver sulfADIAZINE 1% Cream 1 Application(s) Topical every 12 hours  sodium phosphate 30 milliMole(s)/500 mL IVPB 30 milliMole(s) IV Intermittent once  sodium zirconium cyclosilicate 10 Gram(s) Oral every 12 hours    MEDICATIONS  (PRN):  acetaminophen     Tablet .. 650 milliGRAM(s) Oral every 6 hours PRN Temp greater or equal to 38C (100.4F), Mild Pain (1 - 3)  dextrose Oral Gel 15 Gram(s) Oral once PRN Blood Glucose LESS THAN 70 milliGRAM(s)/deciliter  midodrine. 2.5 milliGRAM(s) Oral three times a day PRN SBP < 110  sodium chloride 0.9% Bolus. 100 milliLiter(s) IV Bolus every 5 minutes PRN SBP LESS THAN or EQUAL to 100 mmHg  sodium chloride 0.9% Bolus. 100 milliLiter(s) IV Bolus every 5 minutes PRN SBP LESS THAN or EQUAL to 80 mmHg

## 2023-06-25 NOTE — PROGRESS NOTE ADULT - ASSESSMENT
IMPRESSION:    Sepsis present on admission resolved  Candida UTI  Pseudomonas PNA sp abx  left side atelectasis improved  Bilateral pleural effusions likely volume overload   MICHELLE on CKD III on dialysis   NSTEMI likely type 2  Hyponatremia resolved   Paroxysmal Afib  Acute on chronic anemia suspected UGI Bleed  Chronic respiratory failure  H/o ICH s/p trach and PEG  H/o CAD      PLAN:    CNS: Avoid CNS depressants     HEENT: Oral and trach care    PULMONARY: HOB 45. Aspiration.  No vent changes.  Goal saturation 92-96%. Vent dependents.  Pulmonary toilet. not weanable, dc acetylcys    CARDIOVASCULAR: Avoid overload.      GI:  Feeding  Bowel regimen     RENAL: trend CMP.  Correct as needed.  Nephrology following.  HD  per renal     INFECTIOUS DISEASE: ABX per ID.      HEMATOLOGICAL: DVT prophylaxis.    Monitor CBC.        ENDOCRINE: Follow up FS. Insulin protocol if needed.    DNR  dc planning  Poor prognosis

## 2023-06-25 NOTE — PROGRESS NOTE ADULT - SUBJECTIVE AND OBJECTIVE BOX
Nephrology progress note    Patient was seen and examined, events over the last 24 h noted .  HD sat    Allergies:  penicillin (Other)    Hospital Medications:   MEDICATIONS  (STANDING):  acetylcysteine 20%  Inhalation 4 milliLiter(s) Inhalation every 6 hours  albuterol/ipratropium for Nebulization 3 milliLiter(s) Nebulizer every 6 hours  aMIOdarone    Tablet   Oral   aMIOdarone    Tablet 200 milliGRAM(s) Oral daily  aspirin  chewable 81 milliGRAM(s) Oral daily  atorvastatin 80 milliGRAM(s) Oral at bedtime  buMETAnide Injectable 2 milliGRAM(s) IV Push every 12 hours  chlorhexidine 0.12% Liquid 15 milliLiter(s) Oral Mucosa two times a day  chlorhexidine 2% Cloths 1 Application(s) Topical <User Schedule>  collagenase Ointment 1 Application(s) Topical two times a day  dextrose 5%. 1000 milliLiter(s) (100 mL/Hr) IV Continuous <Continuous>  dextrose 5%. 1000 milliLiter(s) (50 mL/Hr) IV Continuous <Continuous>  dextrose 50% Injectable 25 Gram(s) IV Push once  dextrose 50% Injectable 25 Gram(s) IV Push once  dextrose 50% Injectable 12.5 Gram(s) IV Push once  diltiazem    milliGRAM(s) Oral daily  glucagon  Injectable 1 milliGRAM(s) IntraMuscular once  heparin   Injectable 5000 Unit(s) SubCutaneous every 12 hours  insulin glargine Injectable (LANTUS) 10 Unit(s) SubCutaneous at bedtime  insulin lispro (ADMELOG) corrective regimen sliding scale   SubCutaneous three times a day before meals  levETIRAcetam  Solution 500 milliGRAM(s) Oral two times a day  pantoprazole  Injectable 40 milliGRAM(s) IV Push every 12 hours  silver sulfADIAZINE 1% Cream 1 Application(s) Topical every 12 hours  sodium phosphate 30 milliMole(s)/500 mL IVPB 30 milliMole(s) IV Intermittent once  sodium zirconium cyclosilicate 10 Gram(s) Oral every 12 hours        VITALS:  T(F): 98.7 (06-25-23 @ 11:29), Max: 98.9 (06-24-23 @ 21:00)  HR: 87 (06-25-23 @ 11:29)  BP: 125/67 (06-25-23 @ 11:29)  RR: 20 (06-25-23 @ 05:00)  SpO2: 99% (06-25-23 @ 06:07)  Wt(kg): --    06-23 @ 07:01  -  06-24 @ 07:00  --------------------------------------------------------  IN: 405 mL / OUT: 2000 mL / NET: -1595 mL    06-24 @ 07:01  -  06-25 @ 07:00  --------------------------------------------------------  IN: 1100 mL / OUT: 0 mL / NET: 1100 mL          PHYSICAL EXAM:  	Gen: trach/vent   	Pulm: B/L aline   	CV: S1S2; no rub  	Abd: +distended  	Vascular access:      LABS:  06-25    135  |  97<L>  |  52<H>  ----------------------------<  132<H>  4.9   |  26  |  2.4<H>    Ca    8.4      25 Jun 2023 08:10  Phos  2.0     06-25  Mg     2.6     06-25    TPro  5.2<L>  /  Alb  1.8<L>  /  TBili  0.3  /  DBili      /  AST  22  /  ALT  18  /  AlkPhos  215<H>  06-25                          6.6    9.84  )-----------( 617      ( 25 Jun 2023 08:10 )             21.3       Urine Studies:  Urinalysis Basic - ( 25 Jun 2023 08:10 )    Color:  / Appearance:  / SG:  / pH:   Gluc: 132 mg/dL / Ketone:   / Bili:  / Urobili:    Blood:  / Protein:  / Nitrite:    Leuk Esterase:  / RBC:  / WBC    Sq Epi:  / Non Sq Epi:  / Bacteria:         RADIOLOGY & ADDITIONAL STUDIES:

## 2023-06-26 NOTE — PROGRESS NOTE ADULT - ASSESSMENT
64 yo M with  PMH of ICH (2021) s/p trach and PEG, HTN, HLD, T2DM, CAD s/p stents, Recurrent C diff, BIBEMS from [facility] for abnormal labs. Patient non-verbal at baseline - limited history. Per NH chart  have a BUN greater than 200 and creatinine of 4.3 on outpatient labs. Outpatient CXR also w/ new L sided pleural effusion. In ED, patient was hypotensive with Afib with RVR, found to be in acure renal failure, anemic with elevated trops. He was started on Amiodarone GTT, PPI GTT, gastric lavage with coffee ground secretions, started on broad spectrum abx and admitted to MICU  While in MICU, course complicated by DKA, s/p insulin drip, MICHELLE started on HD, acute anemia s/p PRBC transfusion, NSTEMI, downgraded to SDU -> floor    Acute Renal Failure, started on HD 6/1  Fluid Overload  HAGMA  - non oliguric, RBUS without hydro   - started on HD 6/1 via R [facility] [place]   - Cr much improved s/p HD yesterday, now 1.9  - Nephro on board- last HD 6/20  - continue bumex , document accurate UO   - c/w midodrine 10mg q8hr  - c/w sodium bicarb 1300mg PO TID  -  TTE 6/1 - EF 63%, GIDD   - On HD     #fever, suspected VAP-improved  #Candiduria with U cx C tropicalis  -  CXR Further significant increase in left-sided opacities/effusion with minimal aeration of the left lung. Right-sided opacities redemonstrated    6/11 BCX NGTD     6/11 UCX   >100,000 CFU/ml Pseudomonas aeruginosa    6/11 sputum   Three or more mixed gram negative rods including    Moderate Pseudomonas aeruginosa (Carbapenem Resistant)    6/7 BCX NGTD   - ID consult 6/14: Recommended switching gentamicin to ceftolozane-tazobactam 2.25g IV x1, followed by 450mg IV q8h (HD dosing) for the treatment of pneumonia and UTI due to carbapenem-resistant Pseudomonas aeruginosa x 7 days - ended   - fluconazole, end 6/15    Acute anemia  - HgB stable (baseline 8)   - evaluated by GI - no evidence of GI bleed (PEG tube flushed with return of bile and no blood)  - Monitor H/H closely  - Active T+S  - sp multiple units on this hospitalization    Hyponatremia, likely hypervolemic-resolved  - c/w bumex 2mg iv q12hr and HD per nephro  - daily BMP    Afib w/ RVR - now rate controlled   - currently rate controlled s/p amio gtt  - changed amio to 200 q24H   - started [id number] mg daily as he went back into afib rvr   - now rate controlled    Acute Decompensated CHF  CAD s/p stents  NSTEMI  - Trops 1.80-->1.63-->1.57-->1.55  - seen by cardio 5/31,  c/w asa and statin, medical management for NSTEMI, CAD, c/w Diuresis    DKA, resolved  - s/p insulin drip, now on basal/bolus and tube feeds. Monitor FS     Hx ICH s/p trach and PEG  Bedbound from NH   - c/w keppra 500mg BID for seizure prophylaxis    #Unstageable sacral ulcer  - Burn recs in    DVT Ppx: HSQ  GI Ppx: Pantoprazole 40mg BID  Diet: Peg tube  Activity: bedrest    Overall Prognosis poor.  Pending : GOC ongoing/ HD/   Dispo: TBD

## 2023-06-26 NOTE — PROGRESS NOTE ADULT - SUBJECTIVE AND OBJECTIVE BOX
24H events:    Patient is a 63y old Male who presents with a chief complaint of Abnormal labs (26 Jun 2023 17:05)    Primary diagnosis of MICHELLE (acute kidney injury)       Today is hospital day 27d. This morning patient was seen and examined at bedside.     PAST MEDICAL & SURGICAL HISTORY  HTN (hypertension)    Diabetes mellitus    H/O intracranial hemorrhage    H/O tracheostomy    PEG (percutaneous endoscopic gastrostomy) status    Hyperlipidemia      SOCIAL HISTORY:  Social History:      ALLERGIES:  penicillin (Other)    MEDICATIONS:  STANDING MEDICATIONS  acetylcysteine 20%  Inhalation 4 milliLiter(s) Inhalation every 6 hours  albuterol/ipratropium for Nebulization 3 milliLiter(s) Nebulizer every 6 hours  aMIOdarone    Tablet   Oral   aMIOdarone    Tablet 200 milliGRAM(s) Oral daily  aspirin  chewable 81 milliGRAM(s) Oral daily  atorvastatin 80 milliGRAM(s) Oral at bedtime  buMETAnide Injectable 2 milliGRAM(s) IV Push every 12 hours  chlorhexidine 0.12% Liquid 15 milliLiter(s) Oral Mucosa two times a day  chlorhexidine 2% Cloths 1 Application(s) Topical <User Schedule>  collagenase Ointment 1 Application(s) Topical two times a day  dextrose 5%. 1000 milliLiter(s) IV Continuous <Continuous>  dextrose 5%. 1000 milliLiter(s) IV Continuous <Continuous>  dextrose 50% Injectable 25 Gram(s) IV Push once  dextrose 50% Injectable 25 Gram(s) IV Push once  dextrose 50% Injectable 12.5 Gram(s) IV Push once  diltiazem    milliGRAM(s) Oral daily  glucagon  Injectable 1 milliGRAM(s) IntraMuscular once  heparin   Injectable 5000 Unit(s) SubCutaneous every 12 hours  insulin glargine Injectable (LANTUS) 10 Unit(s) SubCutaneous at bedtime  insulin lispro (ADMELOG) corrective regimen sliding scale   SubCutaneous three times a day before meals  levETIRAcetam  Solution 500 milliGRAM(s) Oral two times a day  pantoprazole  Injectable 40 milliGRAM(s) IV Push every 12 hours  silver sulfADIAZINE 1% Cream 1 Application(s) Topical every 12 hours  sodium phosphate 30 milliMole(s)/500 mL IVPB 30 milliMole(s) IV Intermittent once  sodium zirconium cyclosilicate 10 Gram(s) Oral every 12 hours    PRN MEDICATIONS  acetaminophen     Tablet .. 650 milliGRAM(s) Oral every 6 hours PRN  dextrose Oral Gel 15 Gram(s) Oral once PRN  midodrine. 2.5 milliGRAM(s) Oral three times a day PRN  sodium chloride 0.9% Bolus. 100 milliLiter(s) IV Bolus every 5 minutes PRN  sodium chloride 0.9% Bolus. 100 milliLiter(s) IV Bolus every 5 minutes PRN    VITALS:   T(F): 99  HR: 96  BP: 134/72  RR: 17  SpO2: 100%                            7.1    11.22 )-----------( 612      ( 26 Jun 2023 08:07 )             22.1     06-26    135  |  97<L>  |  60<H>  ----------------------------<  144<H>  5.5<H>   |  26  |  2.6<H>    Ca    8.7      26 Jun 2023 08:07  Phos  2.1     06-26  Mg     2.1     06-26    TPro  5.4<L>  /  Alb  2.1<L>  /  TBili  0.3  /  DBili  x   /  AST  24  /  ALT  20  /  AlkPhos  197<H>  06-26      Urinalysis Basic - ( 26 Jun 2023 08:07 )    Color: x / Appearance: x / SG: x / pH: x  Gluc: 144 mg/dL / Ketone: x  / Bili: x / Urobili: x   Blood: x / Protein: x / Nitrite: x   Leuk Esterase: x / RBC: x / WBC x   Sq Epi: x / Non Sq Epi: x / Bacteria: x

## 2023-06-26 NOTE — PROGRESS NOTE ADULT - ASSESSMENT
IMPRESSION:    Sepsis present on admission resolved  Candida UTI sp tx  Pseudomonas PNA sp abx  left side atelectasis improved  Bilateral pleural effusions likely volume overload   MICHELLE on CKD III on dialysis   NSTEMI likely type 2  Hyponatremia resolved   Paroxysmal Afib  Acute on chronic anemia suspected UGI Bleed  Chronic respiratory failure  H/o ICH s/p trach and PEG  H/o CAD      PLAN:    CNS: Avoid CNS depressants     HEENT: Oral and trach care    PULMONARY: HOB 45. Aspiration.  No vent changes.  Goal saturation 92-96%. Vent dependents.  Pulmonary toilet. not weanable, dc acetylcys    CARDIOVASCULAR: Avoid overload.      GI:  Feeding  Bowel regimen     RENAL: trend CMP.  Correct as needed.  Nephrology following.    INFECTIOUS DISEASE: ABX per ID.      HEMATOLOGICAL: DVT prophylaxis.       ENDOCRINE: Follow up FS. Insulin protocol if needed.    DNR  dc planning  Poor prognosis

## 2023-06-26 NOTE — PROGRESS NOTE ADULT - SUBJECTIVE AND OBJECTIVE BOX
ALICIA BARBOUR  63y  Male      Patient is a 63y old  Male who presents with a chief complaint of Abnormal labs       INTERVAL HPI/OVERNIGHT EVENTS:      ******************************* REVIEW OF SYSTEMS:**********************************************    All other review of systems negative    *********************** VITALS ******************************************    T(F): 99 (06-26-23 @ 13:00)  HR: 96 (06-26-23 @ 15:00) (76 - 96)  BP: 134/72 (06-26-23 @ 15:00) (101/58 - 134/72)  RR: 17 (06-26-23 @ 15:00) (15 - 18)  SpO2: 100% (06-26-23 @ 15:00) (99% - 100%)            ******************************** PHYSICAL EXAM:**************************************************  GENERAL: NAD    PSYCH: no agitation, baseline mentation  HEENT: vented thru trach     NERVOUS SYSTEM:  Alert & awake x 1-2, non verbal   PULMONARY: MICHAEL, CTA    CARDIOVASCULAR: S1S2 RRR    GI: Soft, NT, ND; BS present. PEG     EXTREMITIES:  2+ Peripheral edema    LYMPH: No lymphadenopathy noted    SKIN: SACRAL DECUB       **************************** LABS *******************************************************                          7.1    11.22 )-----------( 612      ( 26 Jun 2023 08:07 )             22.1     06-26    135  |  97<L>  |  60<H>  ----------------------------<  144<H>  5.5<H>   |  26  |  2.6<H>    Ca    8.7      26 Jun 2023 08:07  Phos  2.1     06-26  Mg     2.1     06-26    TPro  5.4<L>  /  Alb  2.1<L>  /  TBili  0.3  /  DBili  x   /  AST  24  /  ALT  20  /  AlkPhos  197<H>  06-26      Urinalysis Basic - ( 26 Jun 2023 08:07 )    Color: x / Appearance: x / SG: x / pH: x  Gluc: 144 mg/dL / Ketone: x  / Bili: x / Urobili: x   Blood: x / Protein: x / Nitrite: x   Leuk Esterase: x / RBC: x / WBC x   Sq Epi: x / Non Sq Epi: x / Bacteria: x        Lactate Trend        CAPILLARY BLOOD GLUCOSE      POCT Blood Glucose.: 140 mg/dL (26 Jun 2023 11:34)          **************************Active Medications *******************************************  penicillin (Other)      acetaminophen     Tablet .. 650 milliGRAM(s) Oral every 6 hours PRN  acetylcysteine 20%  Inhalation 4 milliLiter(s) Inhalation every 6 hours  albuterol/ipratropium for Nebulization 3 milliLiter(s) Nebulizer every 6 hours  aMIOdarone    Tablet 200 milliGRAM(s) Oral daily  aMIOdarone    Tablet   Oral   aspirin  chewable 81 milliGRAM(s) Oral daily  atorvastatin 80 milliGRAM(s) Oral at bedtime  buMETAnide Injectable 2 milliGRAM(s) IV Push every 12 hours  chlorhexidine 0.12% Liquid 15 milliLiter(s) Oral Mucosa two times a day  chlorhexidine 2% Cloths 1 Application(s) Topical <User Schedule>  collagenase Ointment 1 Application(s) Topical two times a day  dextrose 5%. 1000 milliLiter(s) IV Continuous <Continuous>  dextrose 5%. 1000 milliLiter(s) IV Continuous <Continuous>  dextrose 50% Injectable 25 Gram(s) IV Push once  dextrose 50% Injectable 25 Gram(s) IV Push once  dextrose 50% Injectable 12.5 Gram(s) IV Push once  dextrose Oral Gel 15 Gram(s) Oral once PRN  diltiazem    milliGRAM(s) Oral daily  glucagon  Injectable 1 milliGRAM(s) IntraMuscular once  heparin   Injectable 5000 Unit(s) SubCutaneous every 12 hours  insulin glargine Injectable (LANTUS) 10 Unit(s) SubCutaneous at bedtime  insulin lispro (ADMELOG) corrective regimen sliding scale   SubCutaneous three times a day before meals  levETIRAcetam  Solution 500 milliGRAM(s) Oral two times a day  midodrine. 2.5 milliGRAM(s) Oral three times a day PRN  pantoprazole  Injectable 40 milliGRAM(s) IV Push every 12 hours  silver sulfADIAZINE 1% Cream 1 Application(s) Topical every 12 hours  sodium chloride 0.9% Bolus. 100 milliLiter(s) IV Bolus every 5 minutes PRN  sodium chloride 0.9% Bolus. 100 milliLiter(s) IV Bolus every 5 minutes PRN  sodium phosphate 30 milliMole(s)/500 mL IVPB 30 milliMole(s) IV Intermittent once  sodium zirconium cyclosilicate 10 Gram(s) Oral every 12 hours      ***************************************************  RADIOLOGY & ADDITIONAL TESTS:    Imaging Personally Reviewed:  [ ] YES  [ ] NO    HEALTH ISSUES - PROBLEM Dx:  Sepsis    Acute UTI    History of intracranial hemorrhage    Palliative care by specialist    Anemia    MICHELLE (acute kidney injury)    Pain

## 2023-06-26 NOTE — PROGRESS NOTE ADULT - SUBJECTIVE AND OBJECTIVE BOX
Over Night Events: events noted, vent dependant, afebrile, renal reviewed    PHYSICAL EXAM    ICU Vital Signs Last 24 Hrs  T(C): 37 (26 Jun 2023 05:00), Max: 37.1 (25 Jun 2023 11:29)  T(F): 98.6 (26 Jun 2023 05:00), Max: 98.7 (25 Jun 2023 11:29)  HR: 80 (26 Jun 2023 05:00) (76 - 87)  BP: 114/55 (26 Jun 2023 05:00) (101/58 - 125/67)  RR: 18 (26 Jun 2023 05:00) (15 - 18)  SpO2: 100% (26 Jun 2023 05:00) (98% - 100%)    O2 Parameters below as of 25 Jun 2023 14:55  Patient On (Oxygen Delivery Method): ventilator            General: ill looking  HEENT: trach  Lungs: dec bs both bases  Cardiovascular: Regular   Abdomen: Soft, Positive BS  sacral ulcer  unresponsive      06-24-23 @ 07:01  -  06-25-23 @ 07:00  --------------------------------------------------------  IN:    Enteral Tube Flush: 350 mL    Peptamen A.F.: 750 mL  Total IN: 1100 mL    OUT:  Total OUT: 0 mL    Total NET: 1100 mL          LABS:                          7.2    9.99  )-----------( 623      ( 25 Jun 2023 19:30 )             22.9                                               06-25    135  |  97<L>  |  52<H>  ----------------------------<  132<H>  4.9   |  26  |  2.4<H>    Ca    8.4      25 Jun 2023 08:10  Phos  2.0     06-25  Mg     2.6     06-25    TPro  5.2<L>  /  Alb  1.8<L>  /  TBili  0.3  /  DBili  x   /  AST  22  /  ALT  18  /  AlkPhos  215<H>  06-25                                             Urinalysis Basic - ( 25 Jun 2023 08:10 )    Color: x / Appearance: x / SG: x / pH: x  Gluc: 132 mg/dL / Ketone: x  / Bili: x / Urobili: x   Blood: x / Protein: x / Nitrite: x   Leuk Esterase: x / RBC: x / WBC x   Sq Epi: x / Non Sq Epi: x / Bacteria: x                                                  LIVER FUNCTIONS - ( 25 Jun 2023 08:10 )  Alb: 1.8 g/dL / Pro: 5.2 g/dL / ALK PHOS: 215 U/L / ALT: 18 U/L / AST: 22 U/L / GGT: x                                                                                               Mode: AC/ CMV (Assist Control/ Continuous Mandatory Ventilation)  RR (machine): 14  TV (machine): 450  FiO2: 40  PEEP: 8  ITime: 1  MAP: 13  PIP: 30                                          MEDICATIONS  (STANDING):  acetylcysteine 20%  Inhalation 4 milliLiter(s) Inhalation every 6 hours  albuterol/ipratropium for Nebulization 3 milliLiter(s) Nebulizer every 6 hours  aMIOdarone    Tablet   Oral   aMIOdarone    Tablet 200 milliGRAM(s) Oral daily  aspirin  chewable 81 milliGRAM(s) Oral daily  atorvastatin 80 milliGRAM(s) Oral at bedtime  buMETAnide Injectable 2 milliGRAM(s) IV Push every 12 hours  chlorhexidine 0.12% Liquid 15 milliLiter(s) Oral Mucosa two times a day  chlorhexidine 2% Cloths 1 Application(s) Topical <User Schedule>  collagenase Ointment 1 Application(s) Topical two times a day  dextrose 5%. 1000 milliLiter(s) (50 mL/Hr) IV Continuous <Continuous>  dextrose 5%. 1000 milliLiter(s) (100 mL/Hr) IV Continuous <Continuous>  dextrose 50% Injectable 12.5 Gram(s) IV Push once  dextrose 50% Injectable 25 Gram(s) IV Push once  dextrose 50% Injectable 25 Gram(s) IV Push once  diltiazem    milliGRAM(s) Oral daily  glucagon  Injectable 1 milliGRAM(s) IntraMuscular once  heparin   Injectable 5000 Unit(s) SubCutaneous every 12 hours  insulin glargine Injectable (LANTUS) 10 Unit(s) SubCutaneous at bedtime  insulin lispro (ADMELOG) corrective regimen sliding scale   SubCutaneous three times a day before meals  levETIRAcetam  Solution 500 milliGRAM(s) Oral two times a day  pantoprazole  Injectable 40 milliGRAM(s) IV Push every 12 hours  silver sulfADIAZINE 1% Cream 1 Application(s) Topical every 12 hours  sodium phosphate 30 milliMole(s)/500 mL IVPB 30 milliMole(s) IV Intermittent once  sodium zirconium cyclosilicate 10 Gram(s) Oral every 12 hours    MEDICATIONS  (PRN):  acetaminophen     Tablet .. 650 milliGRAM(s) Oral every 6 hours PRN Temp greater or equal to 38C (100.4F), Mild Pain (1 - 3)  dextrose Oral Gel 15 Gram(s) Oral once PRN Blood Glucose LESS THAN 70 milliGRAM(s)/deciliter  midodrine. 2.5 milliGRAM(s) Oral three times a day PRN SBP < 110  sodium chloride 0.9% Bolus. 100 milliLiter(s) IV Bolus every 5 minutes PRN SBP LESS THAN or EQUAL to 100 mmHg  sodium chloride 0.9% Bolus. 100 milliLiter(s) IV Bolus every 5 minutes PRN SBP LESS THAN or EQUAL to 80 mmHg

## 2023-06-26 NOTE — PROGRESS NOTE ADULT - ASSESSMENT
64 yo M with  PMH of ICH (2021) s/p trach and PEG, HTN, HLD, T2DM, CAD s/p stents, Recurrent C diff, BIBEMS from [facility] for abnormal labs. Patient non-verbal at baseline - limited history. Per NH chart  have a BUN greater than 200 and creatinine of 4.3 on outpatient labs. Outpatient CXR also w/ new L sided pleural effusion. In ED, patient was hypotensive with Afib with RVR, found to be in acure renal failure, anemic with elevated trops. He was started on Amiodarone GTT, PPI GTT, gastric lavage with coffee ground secretions, started on broad spectrum abx and admitted to MICU  While in MICU, course complicated by DKA, s/p insulin drip, MICHELLE started on HD, acute anemia s/p PRBC transfusion, NSTEMI, downgraded to SDU -> floor    Acute Renal Failure, started on HD 6/1  Fluid Overload  HAGMA  - non oliguric, RBUS without hydro   - started on HD 6/1 via [facility] [place]   - Cr much improved s/p HD yesterday, now 1.9  - Nephro on board- last HD 6/20  - continue bumex , document accurate UO   - c/w midodrine 10mg q8hr  - c/w sodium bicarb 1300mg PO TID  -  TTE 6/1 - EF 63%, GIDD   - On HD     #fever, suspected VAP-improved  #Candiduria with U cx C tropicalis  -  CXR Further significant increase in left-sided opacities/effusion with minimal aeration of the left lung. Right-sided opacities redemonstrated    6/11 BCX NGTD     6/11 UCX   >100,000 CFU/ml Pseudomonas aeruginosa    6/11 sputum   Three or more mixed gram negative rods including    Moderate Pseudomonas aeruginosa (Carbapenem Resistant)    6/7 BCX NGTD   - ID consult 6/14: Recommended switching gentamicin to ceftolozane-tazobactam 2.25g IV x1, followed by 450mg IV q8h (HD dosing) for the treatment of pneumonia and UTI due to carbapenem-resistant Pseudomonas aeruginosa x 7 days - ended   - fluconazole, end 6/15    Acute anemia  - HgB stable (baseline 8)   - evaluated by GI - no evidence of GI bleed (PEG tube flushed with return of bile and no blood)  - Monitor H/H closely  - Active T+S  - sp multiple units on this hospitalization    Hyponatremia, likely hypervolemic-resolved  - c/w bumex 2mg iv q12hr and HD per nephro  - daily BMP    Afib w/ RVR - now rate controlled   - currently rate controlled s/p amio gtt  - changed amio to 200 q24H   - started [id number] mg daily as he went back into afib rvr   - now rate controlled    Acute Decompensated CHF  CAD s/p stents  NSTEMI  - Trops 1.80-->1.63-->1.57-->1.55  - seen by cardio 5/31,  c/w asa and statin, medical management for NSTEMI, CAD, c/w Diuresis    DKA, resolved  - s/p insulin drip, now on basal/bolus and tube feeds. Monitor FS     Hx ICH s/p trach and PEG  Bedbound from NH   - c/w keppra 500mg BID for seizure prophylaxis    #Unstageable sacral ulcer  - Burn recs in    DVT Ppx: HSQ  GI Ppx: Pantoprazole 40mg BID  Diet: Peg tube  Activity: bedrest    Overall Prognosis poor.  Pending : GOC ongoing/ HD/   Dispo: TBD

## 2023-06-26 NOTE — PROGRESS NOTE ADULT - SUBJECTIVE AND OBJECTIVE BOX
seen and examined  24 h events noted   trach/vent         PAST HISTORY  --------------------------------------------------------------------------------  No significant changes to PMH, PSH, FHx, SHx, unless otherwise noted    ALLERGIES & MEDICATIONS  --------------------------------------------------------------------------------  Allergies    penicillin (Other)    Intolerances      Standing Inpatient Medications  acetylcysteine 20%  Inhalation 4 milliLiter(s) Inhalation every 6 hours  albuterol/ipratropium for Nebulization 3 milliLiter(s) Nebulizer every 6 hours  aMIOdarone    Tablet   Oral   aMIOdarone    Tablet 200 milliGRAM(s) Oral daily  aspirin  chewable 81 milliGRAM(s) Oral daily  atorvastatin 80 milliGRAM(s) Oral at bedtime  buMETAnide Injectable 2 milliGRAM(s) IV Push every 12 hours  chlorhexidine 0.12% Liquid 15 milliLiter(s) Oral Mucosa two times a day  chlorhexidine 2% Cloths 1 Application(s) Topical <User Schedule>  collagenase Ointment 1 Application(s) Topical two times a day  dextrose 5%. 1000 milliLiter(s) IV Continuous <Continuous>  dextrose 5%. 1000 milliLiter(s) IV Continuous <Continuous>  dextrose 50% Injectable 25 Gram(s) IV Push once  dextrose 50% Injectable 25 Gram(s) IV Push once  dextrose 50% Injectable 12.5 Gram(s) IV Push once  diltiazem    milliGRAM(s) Oral daily  glucagon  Injectable 1 milliGRAM(s) IntraMuscular once  heparin   Injectable 5000 Unit(s) SubCutaneous every 12 hours  insulin glargine Injectable (LANTUS) 10 Unit(s) SubCutaneous at bedtime  insulin lispro (ADMELOG) corrective regimen sliding scale   SubCutaneous three times a day before meals  levETIRAcetam  Solution 500 milliGRAM(s) Oral two times a day  pantoprazole  Injectable 40 milliGRAM(s) IV Push every 12 hours  silver sulfADIAZINE 1% Cream 1 Application(s) Topical every 12 hours  sodium phosphate 30 milliMole(s)/500 mL IVPB 30 milliMole(s) IV Intermittent once  sodium zirconium cyclosilicate 10 Gram(s) Oral every 12 hours    PRN Inpatient Medications  acetaminophen     Tablet .. 650 milliGRAM(s) Oral every 6 hours PRN  dextrose Oral Gel 15 Gram(s) Oral once PRN  midodrine. 2.5 milliGRAM(s) Oral three times a day PRN  sodium chloride 0.9% Bolus. 100 milliLiter(s) IV Bolus every 5 minutes PRN  sodium chloride 0.9% Bolus. 100 milliLiter(s) IV Bolus every 5 minutes PRN          VITALS/PHYSICAL EXAM  --------------------------------------------------------------------------------  T(C): 37 (06-26-23 @ 05:00), Max: 37.1 (06-25-23 @ 11:29)  HR: 80 (06-26-23 @ 05:00) (76 - 87)  BP: 114/55 (06-26-23 @ 05:00) (101/58 - 125/67)  RR: 18 (06-26-23 @ 05:00) (15 - 18)  SpO2: 100% (06-26-23 @ 05:00) (98% - 100%)  Wt(kg): --        Physical Exam:  	Gen: trach/vent   	Pulm: B/L aline   	CV:  S1S2; no rub  	Abd: +distended  	LE:  edema  	Vascular access:    LABS/STUDIES  --------------------------------------------------------------------------------              7.2    9.99  >-----------<  623      [06-25-23 @ 19:30]              22.9     135  |  97  |  52  ----------------------------<  132      [06-25-23 @ 08:10]  4.9   |  26  |  2.4        Ca     8.4     [06-25-23 @ 08:10]      Mg     2.6     [06-25-23 @ 08:10]      Phos  2.0     [06-25-23 @ 08:10]    TPro  5.2  /  Alb  1.8  /  TBili  0.3  /  DBili  x   /  AST  22  /  ALT  18  /  AlkPhos  215  [06-25-23 @ 08:10]      Creatinine Trend:  SCr 2.4 [06-25 @ 08:10]  SCr 2.7 [06-23 @ 08:28]  SCr 2.3 [06-22 @ 07:39]  SCr 2.1 [06-21 @ 16:32]  SCr 1.9 [06-20 @ 18:51]    Urinalysis - [06-25-23 @ 08:10]      Color  / Appearance  / SG  / pH       Gluc 132 / Ketone   / Bili  / Urobili        Blood  / Protein  / Leuk Est  / Nitrite       RBC  / WBC  / Hyaline  / Gran  / Sq Epi  / Non Sq Epi  / Bacteria       Iron 51, TIBC 111, %sat 46      [06-06-23 @ 10:40]  Ferritin 1956      [06-06-23 @ 10:40]  Vitamin D (25OH) 70      [06-03-23 @ 06:50]    HBsAb <3.0      [06-01-23 @ 22:50]  HBsAb Nonreact      [06-01-23 @ 22:50]  HBsAg Nonreact      [06-01-23 @ 22:50]  HBcAb Nonreact      [06-01-23 @ 22:50]  HIV Nonreact      [06-08-23 @ 16:00]  HIV Nonreact      [06-08-23 @ 12:20]

## 2023-06-26 NOTE — PROGRESS NOTE ADULT - ASSESSMENT
PMH of ICH (2021) s/p trach and PEG, HTN, HLD, T2DM, CAD s/p stents, Recurrent C diff, BIBEMS from [facility] for abnormal labs.   MICHELLE on CKD 3a - severe azotemia  likely due to GIB / hypotension  Started HD on 6/1/23  Acute anemia d/t UGIB  Troponemia  Lactic acidosis resolved   DKA resolved   Afib   -   s/p hd fri, no hd today   - repeat labs today   - ph improved   - GOC discussion ongoing  -  continue bumex , document UOP   -  feed : blake liao / lokelma 10 q   12   - very poor prognosis   will follow

## 2023-06-27 NOTE — PROGRESS NOTE ADULT - ASSESSMENT
IMPRESSION:    Sepsis present on admission resolved  Candida UTI sp tx  Pseudomonas PNA sp abx  left side atelectasis improved  Bilateral pleural effusions likely volume overload   MICHELLE on CKD III on dialysis   NSTEMI likely type 2  Hyponatremia resolved   Paroxysmal Afib  Acute on chronic anemia suspected UGI Bleed  Chronic respiratory failure  H/o ICH s/p trach and PEG  H/o CAD      PLAN:    CNS: Avoid CNS depressants     HEENT: Oral and trach care    PULMONARY: HOB 45. Aspiration.  No vent changes.  Goal saturation 92-96%. Vent dependents.  Pulmonary toilet. not weanable    CARDIOVASCULAR: Avoid overload.      GI:  Feeding  Bowel regimen     RENAL: trend CMP.  Correct as needed.  Nephrology following.    INFECTIOUS DISEASE: ABX per ID.      HEMATOLOGICAL: DVT prophylaxis.       ENDOCRINE: Follow up FS. Insulin protocol if needed.    DNR  dc planning  Poor prognosis

## 2023-06-27 NOTE — PROGRESS NOTE ADULT - ASSESSMENT
62 yo M with  PMH of ICH (2021) s/p trach and PEG, HTN, HLD, T2DM, CAD s/p stents, Recurrent C diff, BIBEMS from [facility] for abnormal labs. Patient non-verbal at baseline - limited history. Per NH chart  have a BUN greater than 200 and creatinine of 4.3 on outpatient labs. Outpatient CXR also w/ new L sided pleural effusion. In ED, patient was hypotensive with Afib with RVR, found to be in acure renal failure, anemic with elevated trops. He was started on Amiodarone GTT, PPI GTT, gastric lavage with coffee ground secretions, started on broad spectrum abx and admitted to MICU  While in MICU, course complicated by DKA, s/p insulin drip, MICHELLE started on HD, acute anemia s/p PRBC transfusion, NSTEMI, downgraded to SDU -> floor    Acute Renal Failure, started on HD 6/1  Fluid Overload  HAGMA  - non oliguric, RBUS without hydro   - started on HD 6/1 via [facility] [place]   - Cr much improved s/p HD yesterday, now 1.9  - Nephro on board- last HD 6/20  - continue bumex , document accurate UO   - c/w midodrine 10mg q8hr  - c/w sodium bicarb 1300mg PO TID  -  TTE 6/1 - EF 63%, GIDD   - On HD     #fever, suspected VAP-improved  #Candiduria with U cx C tropicalis  -  CXR Further significant increase in left-sided opacities/effusion with minimal aeration of the left lung. Right-sided opacities redemonstrated    6/11 BCX NGTD     6/11 UCX   >100,000 CFU/ml Pseudomonas aeruginosa    6/11 sputum   Three or more mixed gram negative rods including    Moderate Pseudomonas aeruginosa (Carbapenem Resistant)    6/7 BCX NGTD   - ID consult 6/14: Recommended switching gentamicin to ceftolozane-tazobactam 2.25g IV x1, followed by 450mg IV q8h (HD dosing) for the treatment of pneumonia and UTI due to carbapenem-resistant Pseudomonas aeruginosa x 7 days - ended   - fluconazole, end 6/15    Acute anemia  - HgB stable (baseline 8)   - evaluated by GI - no evidence of GI bleed (PEG tube flushed with return of bile and no blood)  - Monitor H/H closely  - Active T+S  - sp multiple units on this hospitalization    Hyponatremia, likely hypervolemic-resolved  - c/w bumex 2mg iv q12hr and HD per nephro  - daily BMP    Afib w/ RVR - now rate controlled   - currently rate controlled s/p amio gtt  - changed amio to 200 q24H   - started [id number] mg daily as he went back into afib rvr   - now rate controlled    Acute Decompensated CHF  CAD s/p stents  NSTEMI  - Trops 1.80-->1.63-->1.57-->1.55  - seen by cardio 5/31,  c/w asa and statin, medical management for NSTEMI, CAD, c/w Diuresis    DKA, resolved  - s/p insulin drip, now on basal/bolus and tube feeds. Monitor FS     Hx ICH s/p trach and PEG  Bedbound from NH   - c/w keppra 500mg BID for seizure prophylaxis    #Unstageable sacral ulcer  - local wound care     DVT Ppx: HSQ  GI Ppx: Pantoprazole 40mg BID  Diet: Peg tube  Activity: bedrest    Overall Prognosis poor.  Pending : GOC ongoing/ HD/   Dispo: TBD

## 2023-06-27 NOTE — PROGRESS NOTE ADULT - ASSESSMENT
64 yo M with  PMH of ICH (2021) s/p trach and PEG, HTN, HLD, T2DM, CAD s/p stents, Recurrent C diff, BIBEMS from [facility] for abnormal labs. Patient non-verbal at baseline - limited history. Per NH chart  have a BUN greater than 200 and creatinine of 4.3 on outpatient labs. Outpatient CXR also w/ new L sided pleural effusion. In ED, patient was hypotensive with Afib with RVR, found to be in acure renal failure, anemic with elevated trops. He was started on Amiodarone GTT, PPI GTT, gastric lavage with coffee ground secretions, started on broad spectrum abx and admitted to MICU  While in MICU, course complicated by DKA, s/p insulin drip, MICHELLE started on HD, acute anemia s/p PRBC transfusion, NSTEMI, downgraded to SDU -> floor    Acute Renal Failure, started on HD 6/1  Fluid Overload  HAGMA  - non oliguric, RBUS without hydro   - started on HD 6/1 via R [facility] [place]   - Cr much improved s/p HD yesterday, now 1.9  - Nephro on board- last HD 6/20  - continue bumex , document accurate UO   - c/w midodrine 10mg q8hr  - c/w sodium bicarb 1300mg PO TID  -  TTE 6/1 - EF 63%, GIDD   - On HD     #fever, suspected VAP-improved  #Candiduria with U cx C tropicalis  -  CXR Further significant increase in left-sided opacities/effusion with minimal aeration of the left lung. Right-sided opacities redemonstrated    6/11 BCX NGTD     6/11 UCX   >100,000 CFU/ml Pseudomonas aeruginosa    6/11 sputum   Three or more mixed gram negative rods including    Moderate Pseudomonas aeruginosa (Carbapenem Resistant)    6/7 BCX NGTD   - ID consult 6/14: Recommended switching gentamicin to ceftolozane-tazobactam 2.25g IV x1, followed by 450mg IV q8h (HD dosing) for the treatment of pneumonia and UTI due to carbapenem-resistant Pseudomonas aeruginosa x 7 days - ended   - fluconazole, end 6/15    Acute anemia  - HgB stable (baseline 8)   - evaluated by GI - no evidence of GI bleed (PEG tube flushed with return of bile and no blood)  - Monitor H/H closely  - Active T+S  - sp multiple units on this hospitalization    Hyponatremia, likely hypervolemic-resolved  - c/w bumex 2mg iv q12hr and HD per nephro  - daily BMP    Afib w/ RVR - now rate controlled   - currently rate controlled s/p amio gtt  - changed amio to 200 q24H   - started [id number] mg daily as he went back into afib rvr   - now rate controlled    Acute Decompensated CHF  CAD s/p stents  NSTEMI  - Trops 1.80-->1.63-->1.57-->1.55  - seen by cardio 5/31,  c/w asa and statin, medical management for NSTEMI, CAD, c/w Diuresis    DKA, resolved  - s/p insulin drip, now on basal/bolus and tube feeds. Monitor FS     Hx ICH s/p trach and PEG  Bedbound from NH   - c/w keppra 500mg BID for seizure prophylaxis    #Unstageable sacral ulcer  - local wound care     DVT Ppx: HSQ  GI Ppx: Pantoprazole 40mg BID  Diet: Peg tube  Activity: bedrest    Overall Prognosis poor.  Pending : GOC ongoing/ HD/   Dispo: TBD

## 2023-06-27 NOTE — PROGRESS NOTE ADULT - SUBJECTIVE AND OBJECTIVE BOX
Over Night Events: events noted, vent dependant, afebrile, renal reviewed    PHYSICAL EXAM    ICU Vital Signs Last 24 Hrs  T(C): 36.7 (26 Jun 2023 21:00), Max: 37.2 (26 Jun 2023 13:00)  T(F): 98 (26 Jun 2023 21:00), Max: 99 (26 Jun 2023 13:00)  HR: 83 (27 Jun 2023 04:30) (73 - 96)  BP: 103/58 (26 Jun 2023 21:00) (103/58 - 134/72)  RR: 20 (26 Jun 2023 21:00) (17 - 20)  SpO2: 100% (27 Jun 2023 04:30) (99% - 100%)    O2 Parameters below as of 26 Jun 2023 21:00  Patient On (Oxygen Delivery Method): ventilator            General: ill looking  HEENT: trach  Lungs: Bilateral BS  Cardiovascular: Regular   Abdomen: Soft, Positive BS  unresponsive  multiple ulcers      06-26-23 @ 07:01  -  06-27-23 @ 05:40  --------------------------------------------------------  IN:    Enteral Tube Flush: 60 mL    Peptamen A.F.: 375 mL  Total IN: 435 mL    OUT:    Other (mL): 1000 mL  Total OUT: 1000 mL    Total NET: -565 mL          LABS:                          7.1    11.22 )-----------( 612      ( 26 Jun 2023 08:07 )             22.1                                               06-26    135  |  97<L>  |  60<H>  ----------------------------<  144<H>  5.5<H>   |  26  |  2.6<H>    Ca    8.7      26 Jun 2023 08:07  Phos  2.1     06-26  Mg     2.1     06-26    TPro  5.4<L>  /  Alb  2.1<L>  /  TBili  0.3  /  DBili  x   /  AST  24  /  ALT  20  /  AlkPhos  197<H>  06-26                                             Urinalysis Basic - ( 26 Jun 2023 08:07 )    Color: x / Appearance: x / SG: x / pH: x  Gluc: 144 mg/dL / Ketone: x  / Bili: x / Urobili: x   Blood: x / Protein: x / Nitrite: x   Leuk Esterase: x / RBC: x / WBC x   Sq Epi: x / Non Sq Epi: x / Bacteria: x                                                  LIVER FUNCTIONS - ( 26 Jun 2023 08:07 )  Alb: 2.1 g/dL / Pro: 5.4 g/dL / ALK PHOS: 197 U/L / ALT: 20 U/L / AST: 24 U/L / GGT: x                                                                                               Mode: AC/ CMV (Assist Control/ Continuous Mandatory Ventilation)  RR (machine): 14  TV (machine): 450  FiO2: 40  PEEP: 8  ITime: 0.8  MAP: 12  PIP: 30                                          MEDICATIONS  (STANDING):  acetylcysteine 20%  Inhalation 4 milliLiter(s) Inhalation every 6 hours  albuterol/ipratropium for Nebulization 3 milliLiter(s) Nebulizer every 6 hours  aMIOdarone    Tablet 200 milliGRAM(s) Oral daily  aMIOdarone    Tablet   Oral   aspirin  chewable 81 milliGRAM(s) Oral daily  atorvastatin 80 milliGRAM(s) Oral at bedtime  buMETAnide Injectable 2 milliGRAM(s) IV Push every 12 hours  chlorhexidine 0.12% Liquid 15 milliLiter(s) Oral Mucosa two times a day  chlorhexidine 2% Cloths 1 Application(s) Topical <User Schedule>  collagenase Ointment 1 Application(s) Topical two times a day  dextrose 5%. 1000 milliLiter(s) (50 mL/Hr) IV Continuous <Continuous>  dextrose 5%. 1000 milliLiter(s) (100 mL/Hr) IV Continuous <Continuous>  dextrose 50% Injectable 25 Gram(s) IV Push once  dextrose 50% Injectable 25 Gram(s) IV Push once  dextrose 50% Injectable 12.5 Gram(s) IV Push once  diltiazem    milliGRAM(s) Oral daily  glucagon  Injectable 1 milliGRAM(s) IntraMuscular once  heparin   Injectable 5000 Unit(s) SubCutaneous every 12 hours  insulin glargine Injectable (LANTUS) 10 Unit(s) SubCutaneous at bedtime  insulin lispro (ADMELOG) corrective regimen sliding scale   SubCutaneous three times a day before meals  levETIRAcetam  Solution 500 milliGRAM(s) Oral two times a day  pantoprazole  Injectable 40 milliGRAM(s) IV Push every 12 hours  silver sulfADIAZINE 1% Cream 1 Application(s) Topical every 12 hours  sodium phosphate 30 milliMole(s)/500 mL IVPB 30 milliMole(s) IV Intermittent once  sodium zirconium cyclosilicate 10 Gram(s) Oral every 12 hours    MEDICATIONS  (PRN):  acetaminophen     Tablet .. 650 milliGRAM(s) Oral every 6 hours PRN Temp greater or equal to 38C (100.4F), Mild Pain (1 - 3)  dextrose Oral Gel 15 Gram(s) Oral once PRN Blood Glucose LESS THAN 70 milliGRAM(s)/deciliter  midodrine. 2.5 milliGRAM(s) Oral three times a day PRN SBP < 110  sodium chloride 0.9% Bolus. 100 milliLiter(s) IV Bolus every 5 minutes PRN SBP LESS THAN or EQUAL to 100 mmHg  sodium chloride 0.9% Bolus. 100 milliLiter(s) IV Bolus every 5 minutes PRN SBP LESS THAN or EQUAL to 80 mmHg

## 2023-06-27 NOTE — PROGRESS NOTE ADULT - SUBJECTIVE AND OBJECTIVE BOX
ALICIA BARBOUR  63y  Male      Patient is a 63y old  Male who presents with a chief complaint of Abnormal labs     INTERVAL HPI/OVERNIGHT EVENTS:      ******************************* REVIEW OF SYSTEMS:**********************************************    All other review of systems negative    *********************** VITALS ******************************************    T(F): 98.7 (06-27-23 @ 13:59)  HR: 82 (06-27-23 @ 13:59) (73 - 84)  BP: 107/57 (06-27-23 @ 13:59) (103/58 - 119/64)  RR: 19 (06-27-23 @ 13:59) (17 - 20)  SpO2: 99% (06-27-23 @ 13:59) (98% - 100%)    06-26-23 @ 07:01  -  06-27-23 @ 07:00  --------------------------------------------------------  IN: 435 mL / OUT: 1000 mL / NET: -565 mL            06-26-23 @ 07:01  -  06-27-23 @ 07:00  --------------------------------------------------------  IN: 435 mL / OUT: 1000 mL / NET: -565 mL        ******************************** PHYSICAL EXAM:**************************************************  GENERAL: NAD    PSYCH: no agitation, baseline mentation  HEENT: vented thru trach     NERVOUS SYSTEM:  Alert & awake x 0-1  PULMONARY: MICHAEL, CTA    CARDIOVASCULAR: S1S2 RRR    GI: Soft, NT, ND; BS present. PEG in place     EXTREMITIES:  2+ Peripheral Pulses, No clubbing, cyanosis, or edema    LYMPH: No lymphadenopathy noted    SKIN: sacral decub       **************************** LABS *******************************************************                          7.6    11.79 )-----------( 535      ( 27 Jun 2023 06:36 )             24.4     06-27    134<L>  |  97<L>  |  47<H>  ----------------------------<  157<H>  4.9   |  27  |  2.0<H>    Ca    8.3<L>      27 Jun 2023 06:36  Phos  2.1     06-26  Mg     2.0     06-27    TPro  5.3<L>  /  Alb  1.9<L>  /  TBili  0.3  /  DBili  x   /  AST  26  /  ALT  21  /  AlkPhos  209<H>  06-27      Urinalysis Basic - ( 27 Jun 2023 06:36 )    Color: x / Appearance: x / SG: x / pH: x  Gluc: 157 mg/dL / Ketone: x  / Bili: x / Urobili: x   Blood: x / Protein: x / Nitrite: x   Leuk Esterase: x / RBC: x / WBC x   Sq Epi: x / Non Sq Epi: x / Bacteria: x        Lactate Trend        CAPILLARY BLOOD GLUCOSE      POCT Blood Glucose.: 119 mg/dL (27 Jun 2023 11:59)          **************************Active Medications *******************************************  penicillin (Other)      acetaminophen     Tablet .. 650 milliGRAM(s) Oral every 6 hours PRN  acetylcysteine 20%  Inhalation 4 milliLiter(s) Inhalation every 6 hours  albuterol/ipratropium for Nebulization 3 milliLiter(s) Nebulizer every 6 hours  aMIOdarone    Tablet 200 milliGRAM(s) Oral daily  aMIOdarone    Tablet   Oral   aspirin  chewable 81 milliGRAM(s) Oral daily  atorvastatin 80 milliGRAM(s) Oral at bedtime  buMETAnide Injectable 2 milliGRAM(s) IV Push every 12 hours  chlorhexidine 0.12% Liquid 15 milliLiter(s) Oral Mucosa two times a day  chlorhexidine 2% Cloths 1 Application(s) Topical <User Schedule>  collagenase Ointment 1 Application(s) Topical two times a day  dextrose 5%. 1000 milliLiter(s) IV Continuous <Continuous>  dextrose 5%. 1000 milliLiter(s) IV Continuous <Continuous>  dextrose 50% Injectable 25 Gram(s) IV Push once  dextrose 50% Injectable 25 Gram(s) IV Push once  dextrose 50% Injectable 12.5 Gram(s) IV Push once  dextrose Oral Gel 15 Gram(s) Oral once PRN  diltiazem    milliGRAM(s) Oral daily  glucagon  Injectable 1 milliGRAM(s) IntraMuscular once  heparin   Injectable 5000 Unit(s) SubCutaneous every 12 hours  insulin glargine Injectable (LANTUS) 10 Unit(s) SubCutaneous at bedtime  insulin lispro (ADMELOG) corrective regimen sliding scale   SubCutaneous three times a day before meals  levETIRAcetam  Solution 500 milliGRAM(s) Oral two times a day  midodrine. 2.5 milliGRAM(s) Oral three times a day PRN  pantoprazole  Injectable 40 milliGRAM(s) IV Push every 12 hours  silver sulfADIAZINE 1% Cream 1 Application(s) Topical every 12 hours  sodium chloride 0.9% Bolus. 100 milliLiter(s) IV Bolus every 5 minutes PRN  sodium chloride 0.9% Bolus. 100 milliLiter(s) IV Bolus every 5 minutes PRN  sodium phosphate 30 milliMole(s)/500 mL IVPB 30 milliMole(s) IV Intermittent once  sodium zirconium cyclosilicate 10 Gram(s) Oral every 12 hours      ***************************************************  RADIOLOGY & ADDITIONAL TESTS:    Imaging Personally Reviewed:  [ ] YES  [ ] NO    HEALTH ISSUES - PROBLEM Dx:  Sepsis    Acute UTI    History of intracranial hemorrhage    Palliative care by specialist    Anemia    MICHELLE (acute kidney injury)    Pain

## 2023-06-27 NOTE — PROGRESS NOTE ADULT - ASSESSMENT
PMH of ICH (2021) s/p trach and PEG, HTN, HLD, T2DM, CAD s/p stents, Recurrent C diff, BIBEMS from [facility] for abnormal labs.   MICHELLE on CKD 3a - severe azotemia  likely due to GIB / hypotension  Started HD on 6/1/23  Acute anemia d/t UGIB  Troponemia  Lactic acidosis resolved   DKA resolved   Afib   -  s/p hd yesterday   - if creatinine continue to increase in am and remains hypperkalemic / will need tdc / if not will d/c udall in am   - ph improved   - GOC discussion ongoing  -  continue bumex , document UOP   -  feed : blake liao / lokelma 10 q   12   - very poor prognosis   will follow

## 2023-06-27 NOTE — PROGRESS NOTE ADULT - SUBJECTIVE AND OBJECTIVE BOX
24H events:    Patient is a 63y old Male who presents with a chief complaint of Abnormal labs (27 Jun 2023 15:29)    Primary diagnosis of MICHELLE (acute kidney injury)       Today is hospital day 28d. This morning patient was seen and examined at bedside, resting comfortably in bed.      PAST MEDICAL & SURGICAL HISTORY  HTN (hypertension)    Diabetes mellitus    H/O intracranial hemorrhage    H/O tracheostomy    PEG (percutaneous endoscopic gastrostomy) status    Hyperlipidemia      SOCIAL HISTORY:  Social History:      ALLERGIES:  penicillin (Other)    MEDICATIONS:  STANDING MEDICATIONS  acetylcysteine 20%  Inhalation 4 milliLiter(s) Inhalation every 6 hours  albuterol/ipratropium for Nebulization 3 milliLiter(s) Nebulizer every 6 hours  aMIOdarone    Tablet   Oral   aMIOdarone    Tablet 200 milliGRAM(s) Oral daily  aspirin  chewable 81 milliGRAM(s) Oral daily  atorvastatin 80 milliGRAM(s) Oral at bedtime  buMETAnide Injectable 2 milliGRAM(s) IV Push every 12 hours  chlorhexidine 0.12% Liquid 15 milliLiter(s) Oral Mucosa two times a day  chlorhexidine 2% Cloths 1 Application(s) Topical <User Schedule>  collagenase Ointment 1 Application(s) Topical two times a day  dextrose 5%. 1000 milliLiter(s) IV Continuous <Continuous>  dextrose 5%. 1000 milliLiter(s) IV Continuous <Continuous>  dextrose 50% Injectable 25 Gram(s) IV Push once  dextrose 50% Injectable 25 Gram(s) IV Push once  dextrose 50% Injectable 12.5 Gram(s) IV Push once  diltiazem    milliGRAM(s) Oral daily  glucagon  Injectable 1 milliGRAM(s) IntraMuscular once  heparin   Injectable 5000 Unit(s) SubCutaneous every 12 hours  insulin glargine Injectable (LANTUS) 10 Unit(s) SubCutaneous at bedtime  insulin lispro (ADMELOG) corrective regimen sliding scale   SubCutaneous three times a day before meals  levETIRAcetam  Solution 500 milliGRAM(s) Oral two times a day  pantoprazole  Injectable 40 milliGRAM(s) IV Push every 12 hours  silver sulfADIAZINE 1% Cream 1 Application(s) Topical every 12 hours  sodium phosphate 30 milliMole(s)/500 mL IVPB 30 milliMole(s) IV Intermittent once  sodium zirconium cyclosilicate 10 Gram(s) Oral every 12 hours    PRN MEDICATIONS  acetaminophen     Tablet .. 650 milliGRAM(s) Oral every 6 hours PRN  dextrose Oral Gel 15 Gram(s) Oral once PRN  midodrine. 2.5 milliGRAM(s) Oral three times a day PRN  sodium chloride 0.9% Bolus. 100 milliLiter(s) IV Bolus every 5 minutes PRN  sodium chloride 0.9% Bolus. 100 milliLiter(s) IV Bolus every 5 minutes PRN    VITALS:   T(F): 98.7  HR: 82  BP: 107/57  RR: 19  SpO2: 99%    PHYSICAL EXAM:  GENERAL: NAD, well-groomed, well-developed  HEAD:  Atraumatic, Normocephalic  EYES: EOMI  NECK: Supple  NERVOUS SYSTEM:  Alert & Oriented X3, non focal   CHEST/LUNG: Clear to auscultation bilaterally; No rales, rhonchi, wheezing, or rubs  HEART: Regular rate and rhythm; No murmurs, rubs, or gallops  ABDOMEN: Soft, Nontender, Nondistended; Bowel sounds present  EXTREMITIES:  2+ Peripheral Pulses, No clubbing, cyanosis, or edema  LYMPH: No lymphadenopathy noted  SKIN: No rashes or lesions  LABS:                        7.6    11.79 )-----------( 535      ( 27 Jun 2023 06:36 )             24.4     06-27    134<L>  |  97<L>  |  47<H>  ----------------------------<  157<H>  4.9   |  27  |  2.0<H>    Ca    8.3<L>      27 Jun 2023 06:36  Phos  2.1     06-26  Mg     2.0     06-27    TPro  5.3<L>  /  Alb  1.9<L>  /  TBili  0.3  /  DBili  x   /  AST  26  /  ALT  21  /  AlkPhos  209<H>  06-27      Urinalysis Basic - ( 27 Jun 2023 06:36 )    Color: x / Appearance: x / SG: x / pH: x  Gluc: 157 mg/dL / Ketone: x  / Bili: x / Urobili: x   Blood: x / Protein: x / Nitrite: x   Leuk Esterase: x / RBC: x / WBC x   Sq Epi: x / Non Sq Epi: x / Bacteria: x                RADIOLOGY:

## 2023-06-27 NOTE — PROGRESS NOTE ADULT - SUBJECTIVE AND OBJECTIVE BOX
seen and examined  24 h events noted   trach/vent         PAST HISTORY  --------------------------------------------------------------------------------  No significant changes to PMH, PSH, FHx, SHx, unless otherwise noted    ALLERGIES & MEDICATIONS  --------------------------------------------------------------------------------  Allergies    penicillin (Other)    Intolerances      Standing Inpatient Medications  acetylcysteine 20%  Inhalation 4 milliLiter(s) Inhalation every 6 hours  albuterol/ipratropium for Nebulization 3 milliLiter(s) Nebulizer every 6 hours  aMIOdarone    Tablet   Oral   aMIOdarone    Tablet 200 milliGRAM(s) Oral daily  aspirin  chewable 81 milliGRAM(s) Oral daily  atorvastatin 80 milliGRAM(s) Oral at bedtime  buMETAnide Injectable 2 milliGRAM(s) IV Push every 12 hours  chlorhexidine 0.12% Liquid 15 milliLiter(s) Oral Mucosa two times a day  chlorhexidine 2% Cloths 1 Application(s) Topical <User Schedule>  collagenase Ointment 1 Application(s) Topical two times a day  dextrose 5%. 1000 milliLiter(s) IV Continuous <Continuous>  dextrose 5%. 1000 milliLiter(s) IV Continuous <Continuous>  dextrose 50% Injectable 25 Gram(s) IV Push once  dextrose 50% Injectable 25 Gram(s) IV Push once  dextrose 50% Injectable 12.5 Gram(s) IV Push once  diltiazem    milliGRAM(s) Oral daily  glucagon  Injectable 1 milliGRAM(s) IntraMuscular once  heparin   Injectable 5000 Unit(s) SubCutaneous every 12 hours  insulin glargine Injectable (LANTUS) 10 Unit(s) SubCutaneous at bedtime  insulin lispro (ADMELOG) corrective regimen sliding scale   SubCutaneous three times a day before meals  levETIRAcetam  Solution 500 milliGRAM(s) Oral two times a day  pantoprazole  Injectable 40 milliGRAM(s) IV Push every 12 hours  silver sulfADIAZINE 1% Cream 1 Application(s) Topical every 12 hours  sodium phosphate 30 milliMole(s)/500 mL IVPB 30 milliMole(s) IV Intermittent once  sodium zirconium cyclosilicate 10 Gram(s) Oral every 12 hours    PRN Inpatient Medications  acetaminophen     Tablet .. 650 milliGRAM(s) Oral every 6 hours PRN  dextrose Oral Gel 15 Gram(s) Oral once PRN  midodrine. 2.5 milliGRAM(s) Oral three times a day PRN  sodium chloride 0.9% Bolus. 100 milliLiter(s) IV Bolus every 5 minutes PRN  sodium chloride 0.9% Bolus. 100 milliLiter(s) IV Bolus every 5 minutes PRN        VITALS/PHYSICAL EXAM  --------------------------------------------------------------------------------  T(C): 36.7 (06-26-23 @ 21:00), Max: 37.2 (06-26-23 @ 13:00)  HR: 83 (06-27-23 @ 05:38) (73 - 96)  BP: 116/59 (06-27-23 @ 05:38) (103/58 - 134/72)  RR: 18 (06-27-23 @ 05:38) (17 - 20)  SpO2: 98% (06-27-23 @ 05:38) (98% - 100%)  Wt(kg): --        06-26-23 @ 07:01  -  06-27-23 @ 07:00  --------------------------------------------------------  IN: 435 mL / OUT: 1000 mL / NET: -565 mL      Physical Exam:  	Gen: trach/vent   	Pulm: B/L aline   	CV:  S1S2; no rub  	Abd: distended  	LE:edema  	Vascular access:    LABS/STUDIES  --------------------------------------------------------------------------------              7.6    11.79 >-----------<  535      [06-27-23 @ 06:36]              24.4     134  |  97  |  47  ----------------------------<  157      [06-27-23 @ 06:36]  4.9   |  27  |  2.0        Ca     8.3     [06-27-23 @ 06:36]      Mg     2.0     [06-27-23 @ 06:36]      Phos  2.1     [06-26-23 @ 08:07]    TPro  5.3  /  Alb  1.9  /  TBili  0.3  /  DBili  x   /  AST  26  /  ALT  21  /  AlkPhos  209  [06-27-23 @ 06:36]      Creatinine Trend:  SCr 2.0 [06-27 @ 06:36]  SCr 2.6 [06-26 @ 08:07]  SCr 2.4 [06-25 @ 08:10]  SCr 2.7 [06-23 @ 08:28]  SCr 2.3 [06-22 @ 07:39]    Urinalysis - [06-27-23 @ 06:36]      Color  / Appearance  / SG  / pH       Gluc 157 / Ketone   / Bili  / Urobili        Blood  / Protein  / Leuk Est  / Nitrite       RBC  / WBC  / Hyaline  / Gran  / Sq Epi  / Non Sq Epi  / Bacteria       Iron 51, TIBC 111, %sat 46      [06-06-23 @ 10:40]  Ferritin 1956      [06-06-23 @ 10:40]  Vitamin D (25OH) 70      [06-03-23 @ 06:50]    HBsAb <3.0      [06-01-23 @ 22:50]  HBsAb Nonreact      [06-01-23 @ 22:50]  HBsAg Nonreact      [06-01-23 @ 22:50]  HBcAb Nonreact      [06-01-23 @ 22:50]  HIV Nonreact      [06-08-23 @ 16:00]  HIV Nonreact      [06-08-23 @ 12:20]

## 2023-06-28 NOTE — PROGRESS NOTE ADULT - SUBJECTIVE AND OBJECTIVE BOX
seen and examined  24 h events noted   trach/vent         PAST HISTORY  --------------------------------------------------------------------------------  No significant changes to PMH, PSH, FHx, SHx, unless otherwise noted    ALLERGIES & MEDICATIONS  --------------------------------------------------------------------------------  Allergies    penicillin (Other)    Intolerances      Standing Inpatient Medications  acetylcysteine 20%  Inhalation 4 milliLiter(s) Inhalation every 6 hours  albuterol/ipratropium for Nebulization 3 milliLiter(s) Nebulizer every 6 hours  aMIOdarone    Tablet   Oral   aMIOdarone    Tablet 200 milliGRAM(s) Oral daily  aspirin  chewable 81 milliGRAM(s) Oral daily  atorvastatin 80 milliGRAM(s) Oral at bedtime  buMETAnide Injectable 2 milliGRAM(s) IV Push every 12 hours  chlorhexidine 0.12% Liquid 15 milliLiter(s) Oral Mucosa two times a day  chlorhexidine 2% Cloths 1 Application(s) Topical <User Schedule>  collagenase Ointment 1 Application(s) Topical two times a day  dextrose 5%. 1000 milliLiter(s) IV Continuous <Continuous>  dextrose 5%. 1000 milliLiter(s) IV Continuous <Continuous>  dextrose 50% Injectable 25 Gram(s) IV Push once  dextrose 50% Injectable 12.5 Gram(s) IV Push once  dextrose 50% Injectable 25 Gram(s) IV Push once  diltiazem    milliGRAM(s) Oral daily  glucagon  Injectable 1 milliGRAM(s) IntraMuscular once  heparin   Injectable 5000 Unit(s) SubCutaneous every 12 hours  insulin glargine Injectable (LANTUS) 10 Unit(s) SubCutaneous at bedtime  insulin lispro (ADMELOG) corrective regimen sliding scale   SubCutaneous three times a day before meals  levETIRAcetam  Solution 500 milliGRAM(s) Oral two times a day  pantoprazole  Injectable 40 milliGRAM(s) IV Push every 12 hours  silver sulfADIAZINE 1% Cream 1 Application(s) Topical every 12 hours  sodium phosphate 30 milliMole(s)/500 mL IVPB 30 milliMole(s) IV Intermittent once  sodium zirconium cyclosilicate 10 Gram(s) Oral every 12 hours    PRN Inpatient Medications  acetaminophen     Tablet .. 650 milliGRAM(s) Oral every 6 hours PRN  dextrose Oral Gel 15 Gram(s) Oral once PRN  midodrine. 2.5 milliGRAM(s) Oral three times a day PRN  sodium chloride 0.9% Bolus. 100 milliLiter(s) IV Bolus every 5 minutes PRN  sodium chloride 0.9% Bolus. 100 milliLiter(s) IV Bolus every 5 minutes PRN          VITALS/PHYSICAL EXAM  --------------------------------------------------------------------------------  T(C): 36.8 (06-28-23 @ 05:30), Max: 37.5 (06-27-23 @ 20:30)  HR: 88 (06-28-23 @ 05:30) (78 - 88)  BP: 109/60 (06-28-23 @ 05:30) (107/57 - 111/60)  RR: 18 (06-28-23 @ 05:30) (18 - 19)  SpO2: 100% (06-28-23 @ 05:30) (99% - 100%)  Wt(kg): --        Physical Exam:  	Gen: trach/vent   	Pulm: CTA B/L  	CV: S1S2; no rub  	Abd: +distended  	LE: edema  	Vascular access:    LABS/STUDIES  --------------------------------------------------------------------------------              7.6    11.79 >-----------<  535      [06-27-23 @ 06:36]              24.4     134  |  97  |  47  ----------------------------<  157      [06-27-23 @ 06:36]  4.9   |  27  |  2.0        Ca     8.3     [06-27-23 @ 06:36]      Mg     2.0     [06-27-23 @ 06:36]    TPro  5.3  /  Alb  1.9  /  TBili  0.3  /  DBili  x   /  AST  26  /  ALT  21  /  AlkPhos  209  [06-27-23 @ 06:36]      Creatinine Trend:  SCr 2.0 [06-27 @ 06:36]  SCr 2.6 [06-26 @ 08:07]  SCr 2.4 [06-25 @ 08:10]  SCr 2.7 [06-23 @ 08:28]  SCr 2.3 [06-22 @ 07:39]    Urinalysis - [06-27-23 @ 06:36]      Color  / Appearance  / SG  / pH       Gluc 157 / Ketone   / Bili  / Urobili        Blood  / Protein  / Leuk Est  / Nitrite       RBC  / WBC  / Hyaline  / Gran  / Sq Epi  / Non Sq Epi  / Bacteria       Iron 51, TIBC 111, %sat 46      [06-06-23 @ 10:40]  Ferritin 1956      [06-06-23 @ 10:40]  Vitamin D (25OH) 70      [06-03-23 @ 06:50]    HBsAb <3.0      [06-01-23 @ 22:50]  HBsAb Nonreact      [06-01-23 @ 22:50]  HBsAg Nonreact      [06-01-23 @ 22:50]  HBcAb Nonreact      [06-01-23 @ 22:50]  HIV Nonreact      [06-08-23 @ 16:00]  HIV Nonreact      [06-08-23 @ 12:20]

## 2023-06-28 NOTE — PROGRESS NOTE ADULT - ASSESSMENT
PMH of ICH (2021) s/p trach and PEG, HTN, HLD, T2DM, CAD s/p stents, Recurrent C diff, BIBEMS from [facility] for abnormal labs.   MICHELLE on CKD 3a - severe azotemia  likely due to GIB / hypotension  Started HD on 6/1/23  Acute anemia d/t UGIB  Troponemia  Lactic acidosis resolved   DKA resolved   Afib   -  last hd monday / check labs today   - if creatinine continue to increase and remains hypperkalemic / will need tdc and hd today  / if not will d/c udall   -  last ph improved   - teixeira d/c , check bladder scan   -  continue bumex , document UOP   -  feed : nepro k noted / lokelma 10 q   12 / can increase to 8 if k >5  - very poor prognosis   will follow

## 2023-06-28 NOTE — PROGRESS NOTE ADULT - ATTENDING COMMENTS
62 yo M with  PMH of ICH (2021) s/p trach and PEG, HTN, HLD, T2DM, CAD s/p stents, Recurrent C diff, BIBEMS from [facility] for abnormal labs. Patient non-verbal at baseline - limited history. Per NH chart  have a BUN greater than 200 and creatinine of 4.3 on outpatient labs. Outpatient CXR also w/ new L sided pleural effusion. In ED, patient was hypotensive with Afib with RVR, found to be in acure renal failure, anemic with elevated trops. He was started on Amiodarone GTT, PPI GTT, gastric lavage with coffee ground secretions, started on broad spectrum abx and admitted to MICU  While in MICU, course complicated by DKA, s/p insulin drip, MICHELLE started on HD, acute anemia s/p PRBC transfusion, NSTEMI, downgraded to SDU -> floor    # Acute Renal Failure, started on HD 6/1  Fluid Overload  HAGMA  - non oliguric, RBUS without hydro   - started on HD 6/1 via R [facility] [place]   - Cr much improved s/p HD yesterday, now 1.9  - Nephro on board- last HD 6/20  - continue bumex , document accurate UO   - c/w midodrine 10mg q8hr  - c/w sodium bicarb 1300mg PO TID  -  TTE 6/1 - EF 63%, GIDD   - On HD     # fever, suspected VAP-improved  #Candiduria with U cx C tropicalis  -  CXR Further significant increase in left-sided opacities/effusion with minimal aeration of the left lung. Right-sided opacities redemonstrated    6/11 BCX NGTD     6/11 UCX   >100,000 CFU/ml Pseudomonas aeruginosa    6/11 sputum   Three or more mixed gram negative rods including    Moderate Pseudomonas aeruginosa (Carbapenem Resistant)    6/7 BCX NGTD   - ID consult 6/14: Recommended switching gentamicin to ceftolozane-tazobactam 2.25g IV x1, followed by 450mg IV q8h (HD dosing) for the treatment of pneumonia and UTI due to carbapenem-resistant Pseudomonas aeruginosa x 7 days - ended   - fluconazole, end 6/15    # Acute anemia  - HgB stable (baseline 8)   - evaluated by GI - no evidence of GI bleed (PEG tube flushed with return of bile and no blood)  - Monitor H/H closely  - transfuse 1 unit   - sp multiple units on this hospitalization  Hemoglobin: 6.5 g/dL (06-28-23 @ 12:00)  Hemoglobin: 7.6 g/dL (06-27-23 @ 06:36)  Hemoglobin: 7.1 g/dL (06-26-23 @ 08:07)  Hemoglobin: 7.2 g/dL (06-25-23 @ 19:30)  Hemoglobin: 6.9 g/dL (06-25-23 @ 16:41)    # Hyponatremia, likely hypervolemic-resolved  - c/w bumex 2mg iv q12hr and HD per nephro  - daily BMP    # Afib w/ RVR - now rate controlled   - currently rate controlled s/p amio gtt  - changed amio to 200 q24H   - started [id number] mg daily as he went back into afib rvr   - now rate controlled    # Acute Decompensated CHF  CAD s/p stents  NSTEMI  - Trops 1.80-->1.63-->1.57-->1.55  - seen by cardio 5/31,  c/w asa and statin, medical management for NSTEMI, CAD, c/w Diuresis    # DKA, resolved  - s/p insulin drip, now on basal/bolus and tube feeds. Monitor FS     # Hx ICH s/p trach and PEG  Bedbound from NH   - c/w keppra 500mg BID for seizure prophylaxis    # multiple sacral wounds. SKIN: dressings in place, clean and dry. (recently changed by nursing)   Nurse was able to provide multiple pictures of wounds. no active bleeding, no purulent drainage  sacrum: ~12x6x0.5cm full thickness wound with mostly pink  tissues, some yellow adherent exudate in the 6pm position  left buttocks: ~5xuo1hj wound with adherent yellow/brown eschar centrally surrounded by pink moist dermis  right buttocks: ~5x3cm wound with small adherent yellow/brown eschar centrally surrounded by pink moist dermis  back: ~5x4cm wound with small area of adherent yellow eschar and surround pink dermis  lateral ankle~2x2cm wound with pink moist dermis exposed    #Progress Note Handoff  Pending :  transfuse 1 unit of prbc , trend hgb   Family discussion: resident updating wife   Disposition: SNF 62 yo M with  PMH of ICH (2021) s/p trach and PEG, HTN, HLD, T2DM, CAD s/p stents, Recurrent C diff, BIBEMS from [facility] for abnormal labs. Patient non-verbal at baseline - limited history. Per NH chart  have a BUN greater than 200 and creatinine of 4.3 on outpatient labs. Outpatient CXR also w/ new L sided pleural effusion. In ED, patient was hypotensive with Afib with RVR, found to be in acure renal failure, anemic with elevated trops. He was started on Amiodarone GTT, PPI GTT, gastric lavage with coffee ground secretions, started on broad spectrum abx and admitted to MICU  While in MICU, course complicated by DKA, s/p insulin drip, MICHELLE started on HD, acute anemia s/p PRBC transfusion, NSTEMI, downgraded to SDU -> floor    # Acute Renal Failure, started on HD 6/1  Fluid Overload  HAGMA  - non oliguric, RBUS without hydro   - started on HD 6/1 via R IJ [place]   - Cr much improved s/p HD yesterday, now 1.9  - Nephro on board- last HD 6/20  - continue bumex , document accurate UO   - c/w midodrine 10mg q8hr  - c/w sodium bicarb 1300mg PO TID  -  TTE 6/1 - EF 63%, GIDD   - On HD     # fever, suspected VAP-improved  #Candiduria with U cx C tropicalis  -  CXR Further significant increase in left-sided opacities/effusion with minimal aeration of the left lung. Right-sided opacities redemonstrated    6/11 BCX NGTD     6/11 UCX   >100,000 CFU/ml Pseudomonas aeruginosa    6/11 sputum   Three or more mixed gram negative rods including    Moderate Pseudomonas aeruginosa (Carbapenem Resistant)    6/7 BCX NGTD   - ID consult 6/14: Recommended switching gentamicin to ceftolozane-tazobactam 2.25g IV x1, followed by 450mg IV q8h (HD dosing) for the treatment of pneumonia and UTI due to carbapenem-resistant Pseudomonas aeruginosa x 7 days - ended   - fluconazole, end 6/15    # Acute anemia  - HgB stable (baseline 8)   - evaluated by GI - no evidence of GI bleed (PEG tube flushed with return of bile and no blood)  - Monitor H/H closely  - transfuse 1 unit   - sp multiple units on this hospitalization  Hemoglobin: 6.5 g/dL (06-28-23 @ 12:00)  Hemoglobin: 7.6 g/dL (06-27-23 @ 06:36)  Hemoglobin: 7.1 g/dL (06-26-23 @ 08:07)  Hemoglobin: 7.2 g/dL (06-25-23 @ 19:30)  Hemoglobin: 6.9 g/dL (06-25-23 @ 16:41)    # Hyponatremia, likely hypervolemic-resolved  - c/w bumex 2mg iv q12hr and HD per nephro  - daily BMP    # Afib w/ RVR - now rate controlled   - currently rate controlled s/p amio gtt  - changed amio to 200 q24H   - started [id number] mg daily as he went back into afib rvr   - now rate controlled    # Acute Decompensated CHF  CAD s/p stents  NSTEMI  - Trops 1.80-->1.63-->1.57-->1.55  - seen by cardio 5/31,  c/w asa and statin, medical management for NSTEMI, CAD, c/w Diuresis    # DKA, resolved  - s/p insulin drip, now on basal/bolus and tube feeds. Monitor FS     # Hx ICH s/p trach and PEG  Bedbound from NH   - c/w keppra 500mg BID for seizure prophylaxis    # multiple sacral wounds. SKIN: dressings in place, clean and dry. (recently changed by nursing)   Nurse was able to provide multiple pictures of wounds. no active bleeding, no purulent drainage  sacrum: ~12x6x0.5cm full thickness wound with mostly pink  tissues, some yellow adherent exudate in the 6pm position  left buttocks: ~4mno2fq wound with adherent yellow/brown eschar centrally surrounded by pink moist dermis  right buttocks: ~5x3cm wound with small adherent yellow/brown eschar centrally surrounded by pink moist dermis  back: ~5x4cm wound with small area of adherent yellow eschar and surround pink dermis  lateral ankle~2x2cm wound with pink moist dermis exposed    #Progress Note Handoff  Pending :  transfuse 1 unit of prbc , trend hgb   Family discussion: resident updating wife   Disposition: SNF when stable   pt is very high risk for complications  time spent 50 min

## 2023-06-28 NOTE — PROGRESS NOTE ADULT - SUBJECTIVE AND OBJECTIVE BOX
24H events:    Patient is a 63y old Male who presents with a chief complaint of Abnormal labs (28 Jun 2023 08:40)    Primary diagnosis of MICHELLE (acute kidney injury)       Today is hospital day 29d. This morning patient was seen and examined at bedside.     PAST MEDICAL & SURGICAL HISTORY  HTN (hypertension)    Diabetes mellitus    H/O intracranial hemorrhage    H/O tracheostomy    PEG (percutaneous endoscopic gastrostomy) status    Hyperlipidemia      SOCIAL HISTORY:  Social History:      ALLERGIES:  penicillin (Other)    MEDICATIONS:  STANDING MEDICATIONS  acetylcysteine 20%  Inhalation 4 milliLiter(s) Inhalation every 6 hours  albuterol/ipratropium for Nebulization 3 milliLiter(s) Nebulizer every 6 hours  aMIOdarone    Tablet   Oral   aMIOdarone    Tablet 200 milliGRAM(s) Oral daily  aspirin  chewable 81 milliGRAM(s) Oral daily  atorvastatin 80 milliGRAM(s) Oral at bedtime  buMETAnide Injectable 2 milliGRAM(s) IV Push every 12 hours  chlorhexidine 0.12% Liquid 15 milliLiter(s) Oral Mucosa two times a day  chlorhexidine 2% Cloths 1 Application(s) Topical <User Schedule>  collagenase Ointment 1 Application(s) Topical two times a day  dextrose 5%. 1000 milliLiter(s) IV Continuous <Continuous>  dextrose 5%. 1000 milliLiter(s) IV Continuous <Continuous>  dextrose 50% Injectable 25 Gram(s) IV Push once  dextrose 50% Injectable 12.5 Gram(s) IV Push once  dextrose 50% Injectable 25 Gram(s) IV Push once  diltiazem    milliGRAM(s) Oral daily  glucagon  Injectable 1 milliGRAM(s) IntraMuscular once  heparin   Injectable 5000 Unit(s) SubCutaneous every 12 hours  insulin glargine Injectable (LANTUS) 10 Unit(s) SubCutaneous at bedtime  insulin lispro (ADMELOG) corrective regimen sliding scale   SubCutaneous three times a day before meals  levETIRAcetam  Solution 500 milliGRAM(s) Oral two times a day  pantoprazole  Injectable 40 milliGRAM(s) IV Push every 12 hours  silver sulfADIAZINE 1% Cream 1 Application(s) Topical every 12 hours  sodium phosphate 30 milliMole(s)/500 mL IVPB 30 milliMole(s) IV Intermittent once  sodium zirconium cyclosilicate 10 Gram(s) Oral every 8 hours    PRN MEDICATIONS  acetaminophen     Tablet .. 650 milliGRAM(s) Oral every 6 hours PRN  dextrose Oral Gel 15 Gram(s) Oral once PRN  midodrine. 2.5 milliGRAM(s) Oral three times a day PRN  sodium chloride 0.9% Bolus. 100 milliLiter(s) IV Bolus every 5 minutes PRN  sodium chloride 0.9% Bolus. 100 milliLiter(s) IV Bolus every 5 minutes PRN    VITALS:   T(F): 98.3  HR: 82  BP: 109/60  RR: 18  SpO2: 100%      LABS:                        6.5    12.06 )-----------( 509      ( 28 Jun 2023 12:00 )             20.8     06-28    134<L>  |  95<L>  |  61<HH>  ----------------------------<  127<H>  5.2<H>   |  31  |  2.5<H>    Ca    8.7      28 Jun 2023 12:00  Mg     2.0     06-27    TPro  5.3<L>  /  Alb  1.9<L>  /  TBili  0.3  /  DBili  x   /  AST  26  /  ALT  21  /  AlkPhos  209<H>  06-27      Urinalysis Basic - ( 28 Jun 2023 12:00 )    Color: x / Appearance: x / SG: x / pH: x  Gluc: 127 mg/dL / Ketone: x  / Bili: x / Urobili: x   Blood: x / Protein: x / Nitrite: x   Leuk Esterase: x / RBC: x / WBC x   Sq Epi: x / Non Sq Epi: x / Bacteria: x                           24H events:    Patient is a 63y old Male who presents with a chief complaint of Abnormal labs (28 Jun 2023 08:40)    Primary diagnosis of MICHELLE (acute kidney injury)    Today is hospital day 29d. This morning patient was seen and examined at bedside.     PAST MEDICAL & SURGICAL HISTORY  HTN (hypertension)    Diabetes mellitus    H/O intracranial hemorrhage    H/O tracheostomy    PEG (percutaneous endoscopic gastrostomy) status    Hyperlipidemia      SOCIAL HISTORY:  Social History:      ALLERGIES:  penicillin (Other)    MEDICATIONS:  STANDING MEDICATIONS  acetylcysteine 20%  Inhalation 4 milliLiter(s) Inhalation every 6 hours  albuterol/ipratropium for Nebulization 3 milliLiter(s) Nebulizer every 6 hours  aMIOdarone    Tablet   Oral   aMIOdarone    Tablet 200 milliGRAM(s) Oral daily  aspirin  chewable 81 milliGRAM(s) Oral daily  atorvastatin 80 milliGRAM(s) Oral at bedtime  buMETAnide Injectable 2 milliGRAM(s) IV Push every 12 hours  chlorhexidine 0.12% Liquid 15 milliLiter(s) Oral Mucosa two times a day  chlorhexidine 2% Cloths 1 Application(s) Topical <User Schedule>  collagenase Ointment 1 Application(s) Topical two times a day  dextrose 5%. 1000 milliLiter(s) IV Continuous <Continuous>  dextrose 5%. 1000 milliLiter(s) IV Continuous <Continuous>  dextrose 50% Injectable 25 Gram(s) IV Push once  dextrose 50% Injectable 12.5 Gram(s) IV Push once  dextrose 50% Injectable 25 Gram(s) IV Push once  diltiazem    milliGRAM(s) Oral daily  glucagon  Injectable 1 milliGRAM(s) IntraMuscular once  heparin   Injectable 5000 Unit(s) SubCutaneous every 12 hours  insulin glargine Injectable (LANTUS) 10 Unit(s) SubCutaneous at bedtime  insulin lispro (ADMELOG) corrective regimen sliding scale   SubCutaneous three times a day before meals  levETIRAcetam  Solution 500 milliGRAM(s) Oral two times a day  pantoprazole  Injectable 40 milliGRAM(s) IV Push every 12 hours  silver sulfADIAZINE 1% Cream 1 Application(s) Topical every 12 hours  sodium phosphate 30 milliMole(s)/500 mL IVPB 30 milliMole(s) IV Intermittent once  sodium zirconium cyclosilicate 10 Gram(s) Oral every 8 hours    PRN MEDICATIONS  acetaminophen     Tablet .. 650 milliGRAM(s) Oral every 6 hours PRN  dextrose Oral Gel 15 Gram(s) Oral once PRN  midodrine. 2.5 milliGRAM(s) Oral three times a day PRN  sodium chloride 0.9% Bolus. 100 milliLiter(s) IV Bolus every 5 minutes PRN  sodium chloride 0.9% Bolus. 100 milliLiter(s) IV Bolus every 5 minutes PRN    VITALS:   T(F): 98.3  HR: 82  BP: 109/60  RR: 18  SpO2: 100%      LABS:                        6.5    12.06 )-----------( 509      ( 28 Jun 2023 12:00 )             20.8     06-28    134<L>  |  95<L>  |  61<HH>  ----------------------------<  127<H>  5.2<H>   |  31  |  2.5<H>    Ca    8.7      28 Jun 2023 12:00  Mg     2.0     06-27    TPro  5.3<L>  /  Alb  1.9<L>  /  TBili  0.3  /  DBili  x   /  AST  26  /  ALT  21  /  AlkPhos  209<H>  06-27      Urinalysis Basic - ( 28 Jun 2023 12:00 )    Color: x / Appearance: x / SG: x / pH: x  Gluc: 127 mg/dL / Ketone: x  / Bili: x / Urobili: x   Blood: x / Protein: x / Nitrite: x   Leuk Esterase: x / RBC: x / WBC x   Sq Epi: x / Non Sq Epi: x / Bacteria: x

## 2023-06-28 NOTE — PROGRESS NOTE ADULT - ASSESSMENT
IMPRESSION:    Sepsis present on admission resolved  Candida UTI sp tx  Pseudomonas PNA sp abx  left side atelectasis improved  Bilateral pleural effusions likely volume overload   MICHELLE on CKD III on dialysis   NSTEMI likely type 2  Hyponatremia resolved   Paroxysmal Afib  Acute on chronic anemia suspected UGI Bleed  Chronic respiratory failure  H/o ICH s/p trach and PEG  H/o CAD      PLAN:    CNS: Avoid CNS depressants     HEENT: Oral and trach care    PULMONARY: HOB 45. Aspiration.  No vent changes.  Goal saturation 92-96%. Vent dependents.  Pulmonary toilet. not weanable    CARDIOVASCULAR: Avoid overload.      GI:  Feeding  Bowel regimen     RENAL: trend CMP.  Correct as needed.  Nephrology following. HD    INFECTIOUS DISEASE: ABX per ID.      HEMATOLOGICAL: DVT prophylaxis.       ENDOCRINE: Follow up FS. Insulin protocol if needed.    DNR  dc planning  Poor prognosis

## 2023-06-28 NOTE — PROGRESS NOTE ADULT - ASSESSMENT
64 yo M with  PMH of ICH (2021) s/p trach and PEG, HTN, HLD, T2DM, CAD s/p stents, Recurrent C diff, BIBEMS from [facility] for abnormal labs. Patient non-verbal at baseline - limited history. Per NH chart  have a BUN greater than 200 and creatinine of 4.3 on outpatient labs. Outpatient CXR also w/ new L sided pleural effusion. In ED, patient was hypotensive with Afib with RVR, found to be in acure renal failure, anemic with elevated trops. He was started on Amiodarone GTT, PPI GTT, gastric lavage with coffee ground secretions, started on broad spectrum abx and admitted to MICU  While in MICU, course complicated by DKA, s/p insulin drip, MICHELLE started on HD, acute anemia s/p PRBC transfusion, NSTEMI, downgraded to SDU -> floor    Acute Renal Failure, started on HD 6/1  Fluid Overload  HAGMA  - non oliguric, RBUS without hydro   - started on HD 6/1 via R IJ [place]   - Cr much improved s/p HD yesterday, now 1.9  - Nephro on board- last HD 6/20  - continue bumex , document accurate UO   - c/w midodrine 10mg q8hr  - c/w sodium bicarb 1300mg PO TID  -  TTE 6/1 - EF 63%, GIDD   - On HD   - if creatinine continue to increase and remains hyperkalemic / will need tdc and hd today  / if not will d/c udall   -  last ph improved   - teixeira d/c , check bladder scan    -  feed : nepro k noted / lokelma 10 q   12 / can increase to 8 if k >5  - very poor prognosis   will follow        #fever, suspected VAP-improved  #Candiduria with U cx C tropicalis  -  CXR Further significant increase in left-sided opacities/effusion with minimal aeration of the left lung. Right-sided opacities re-demonstrated    6/11 BCX NGTD     6/11 UCX   >100,000 CFU/ml Pseudomonas aeruginosa    6/11 sputum   Three or more mixed gram negative rods including    Moderate Pseudomonas aeruginosa (Carbapenem Resistant)    6/7 BCX NGTD   - ID consult 6/14: Recommended switching gentamicin to ceftolozane-tazobactam 2.25g IV x1, followed by 450mg IV q8h (HD dosing) for the treatment of pneumonia and UTI due to carbapenem-resistant Pseudomonas aeruginosa x 7 days - ended   - fluconazole, end 6/15    Acute anemia  - HgB stable (baseline 8)   - evaluated by GI - no evidence of GI bleed (PEG tube flushed with return of bile and no blood)  - Monitor H/H closely  - Active T+S  - sp multiple units on this hospitalization    Hyponatremia, likely hypervolemic-resolved  - c/w bumex 2mg iv q12hr and HD per nephro  - daily BMP    Afib w/ RVR - now rate controlled   - currently rate controlled s/p amio gtt  - changed amio to 200 q24H   - started [id number] mg daily as he went back into afib rvr   - now rate controlled    Acute Decompensated CHF  CAD s/p stents  NSTEMI  - Trops 1.80-->1.63-->1.57-->1.55  - seen by cardio 5/31,  c/w asa and statin, medical management for NSTEMI, CAD, c/w Diuresis    DKA, resolved  - s/p insulin drip, now on basal/bolus and tube feeds. Monitor FS     Hx ICH s/p trach and PEG  Bedbound from NH   - c/w keppra 500mg BID for seizure prophylaxis    #Unstageable sacral ulcer  - local wound care     DVT Ppx: HSQ  GI Ppx: Pantoprazole 40mg BID  Diet: Peg tube  Activity: bedrest    Overall Prognosis poor.  Pending : GOC ongoing/ HD/   Dispo: TBD      No

## 2023-06-28 NOTE — PROGRESS NOTE ADULT - SUBJECTIVE AND OBJECTIVE BOX
Over Night Events: events noted, vent dependant, low grade T, renal reviewed    PHYSICAL EXAM    ICU Vital Signs Last 24 Hrs  T(C): 37.5 (27 Jun 2023 20:30), Max: 37.5 (27 Jun 2023 20:30)  T(F): 99.5 (27 Jun 2023 20:30), Max: 99.5 (27 Jun 2023 20:30)  HR: 78 (28 Jun 2023 03:38) (78 - 86)  BP: 111/60 (27 Jun 2023 20:30) (107/57 - 111/60)  RR: 18 (27 Jun 2023 20:30) (18 - 19)  SpO2: 100% (28 Jun 2023 03:38) (98% - 100%)        General: ill looking  HEENT: trac  Lungs: DEC BS Both bases  Cardiovascular: HERI 2.6  Abdomen: Soft, Positive BS  unresponsive      06-26-23 @ 07:01  -  06-27-23 @ 07:00  --------------------------------------------------------  IN:    Enteral Tube Flush: 60 mL    Peptamen A.F.: 375 mL  Total IN: 435 mL    OUT:    Other (mL): 1000 mL  Total OUT: 1000 mL    Total NET: -565 mL          LABS:                          7.6    11.79 )-----------( 535      ( 27 Jun 2023 06:36 )             24.4                                               06-27    134<L>  |  97<L>  |  47<H>  ----------------------------<  157<H>  4.9   |  27  |  2.0<H>    Ca    8.3<L>      27 Jun 2023 06:36  Phos  2.1     06-26  Mg     2.0     06-27    TPro  5.3<L>  /  Alb  1.9<L>  /  TBili  0.3  /  DBili  x   /  AST  26  /  ALT  21  /  AlkPhos  209<H>  06-27                                             Urinalysis Basic - ( 27 Jun 2023 06:36 )    Color: x / Appearance: x / SG: x / pH: x  Gluc: 157 mg/dL / Ketone: x  / Bili: x / Urobili: x   Blood: x / Protein: x / Nitrite: x   Leuk Esterase: x / RBC: x / WBC x   Sq Epi: x / Non Sq Epi: x / Bacteria: x                                                  LIVER FUNCTIONS - ( 27 Jun 2023 06:36 )  Alb: 1.9 g/dL / Pro: 5.3 g/dL / ALK PHOS: 209 U/L / ALT: 21 U/L / AST: 26 U/L / GGT: x                                                                                               Mode: AC/ CMV (Assist Control/ Continuous Mandatory Ventilation)  RR (machine): 14  TV (machine): 450  FiO2: 40  PEEP: 8  ITime: 1  MAP: 12  PIP: 34                                          MEDICATIONS  (STANDING):  acetylcysteine 20%  Inhalation 4 milliLiter(s) Inhalation every 6 hours  albuterol/ipratropium for Nebulization 3 milliLiter(s) Nebulizer every 6 hours  aMIOdarone    Tablet   Oral   aMIOdarone    Tablet 200 milliGRAM(s) Oral daily  aspirin  chewable 81 milliGRAM(s) Oral daily  atorvastatin 80 milliGRAM(s) Oral at bedtime  buMETAnide Injectable 2 milliGRAM(s) IV Push every 12 hours  chlorhexidine 0.12% Liquid 15 milliLiter(s) Oral Mucosa two times a day  chlorhexidine 2% Cloths 1 Application(s) Topical <User Schedule>  collagenase Ointment 1 Application(s) Topical two times a day  dextrose 5%. 1000 milliLiter(s) (50 mL/Hr) IV Continuous <Continuous>  dextrose 5%. 1000 milliLiter(s) (100 mL/Hr) IV Continuous <Continuous>  dextrose 50% Injectable 25 Gram(s) IV Push once  dextrose 50% Injectable 12.5 Gram(s) IV Push once  dextrose 50% Injectable 25 Gram(s) IV Push once  diltiazem    milliGRAM(s) Oral daily  glucagon  Injectable 1 milliGRAM(s) IntraMuscular once  heparin   Injectable 5000 Unit(s) SubCutaneous every 12 hours  insulin glargine Injectable (LANTUS) 10 Unit(s) SubCutaneous at bedtime  insulin lispro (ADMELOG) corrective regimen sliding scale   SubCutaneous three times a day before meals  levETIRAcetam  Solution 500 milliGRAM(s) Oral two times a day  pantoprazole  Injectable 40 milliGRAM(s) IV Push every 12 hours  silver sulfADIAZINE 1% Cream 1 Application(s) Topical every 12 hours  sodium phosphate 30 milliMole(s)/500 mL IVPB 30 milliMole(s) IV Intermittent once  sodium zirconium cyclosilicate 10 Gram(s) Oral every 12 hours    MEDICATIONS  (PRN):  acetaminophen     Tablet .. 650 milliGRAM(s) Oral every 6 hours PRN Temp greater or equal to 38C (100.4F), Mild Pain (1 - 3)  dextrose Oral Gel 15 Gram(s) Oral once PRN Blood Glucose LESS THAN 70 milliGRAM(s)/deciliter  midodrine. 2.5 milliGRAM(s) Oral three times a day PRN SBP < 110  sodium chloride 0.9% Bolus. 100 milliLiter(s) IV Bolus every 5 minutes PRN SBP LESS THAN or EQUAL to 80 mmHg  sodium chloride 0.9% Bolus. 100 milliLiter(s) IV Bolus every 5 minutes PRN SBP LESS THAN or EQUAL to 100 mmHg

## 2023-06-29 NOTE — PROGRESS NOTE ADULT - SUBJECTIVE AND OBJECTIVE BOX
{\rtf1\ulnztb03022\ansi\phwhekc6614\ftnbj\uc1\deff0  {\fonttbl{\f0 \fnil Segoe UI;}{\f1 \fnil \fcharset0 Segoe UI;}{\f2 \fnil Times New Jonathan;}}  {\colortbl ;\yay304\leqwu742\mksm188 ;\red0\green0\blue0 ;\red0\green0\omtp664 ;\red0\green0\blue0 ;}  {\stylesheet{\f0\fs20 Normal;}{\cs1 Default Paragraph Font;}{\cs2\f0\fs16 Line Number;}{\cs3\f2\fs24\ul\cf3 Hyperlink;}}  {\*\revtbl{Unknown;}}  \vlyiub68145\xlzxun01462\hxgyj0082\arpzr7868\ikjkn6617\qqime6793\dnvzdkw436\dzruvpk343\nogrowautofit\zhjoxr852\formshade\nofeaturethrottle1\dntblnsbdb\fet4\aendnotes\aftnnrlc\pgbrdrhead\pgbrdrfoot  \sectd\tgrwio40307\bcwccp71866\guttersxn0\ttrhnsjn7201\ttznetwg1789\byozfezq0028\emnnnxva4171\lmczmba125\kyznbrm450\sbkpage\pgncont\pgndec  \plain\plain\f0\fs24\pard\plain\f0\fs24\plain\f0\fs20\hfgl8108\hich\f0\dbch\f0\loch\f0\fs20 pt seen and examined. \par  \par  Resident's notes reviewed, My notes supersede resident's notes in case of discrepancy  \par   \par  ROS: no cp, no sob, no n/v, no fever\par  \par  Vital Signs Last 24 Hrs\par  T(C): 36.8 (29 Jun 2023 05:38), Max: 36.8 (29 Jun 2023 05:38)\par  T(F): 98.3 (29 Jun 2023 05:38), Max: 98.3 (29 Jun 2023 05:38)\par  HR: 84 (29 Jun 2023 05:38) (80 - 88)\par  BP: 128/67 (29 Jun 2023 05:38) (114/60 - 128/67)\par  BP(mean): --\par  RR: 18 (29 Jun 2023 05:38) (18 - 18)\par  SpO2: 100% (29 Jun 2023 04:22) (100% - 100%)\par  \par  Parameters below as of 28 Jun 2023 15:55\par  \par  \par  O2 Concentration (%): 40\par  \par  physical exam\par  constitutional NAD, AAOX3, Respiratory  lungs CTA, CVS heart RRR, GI: abdomen Soft NT, ND, BS+, skin: intact\par  neuro exam Motor, sensory and CN normal, no deficit \par  \par  MEDICATIONS  (STANDING):\par  acetylcysteine 20%  Inhalation 4 milliLiter(s) Inhalation every 6 hours\par  albuterol/ipratropium for Nebulization 3 milliLiter(s) Nebulizer every 6 hours\par  aMIOdarone    Tablet 200 milliGRAM(s) Oral daily\par  aMIOdarone    Tablet   Oral \par  aspirin  chewable 81 milliGRAM(s) Oral daily\par  atorvastatin 80 milliGRAM(s) Oral at bedtime\par  buMETAnide Injectable 2 milliGRAM(s) IV Push every 12 hours\par  chlorhexidine 0.12% Liquid 15 milliLiter(s) Oral Mucosa two times a day\par  chlorhexidine 2% Cloths 1 Application(s) Topical <User Schedule>\par  collagenase Ointment 1 Application(s) Topical two times a day\par  dextrose 5%. 1000 milliLiter(s) (100 mL/Hr) IV Continuous <Continuous>\par  dextrose 5%. 1000 milliLiter(s) (50 mL/Hr) IV Continuous <Continuous>\par  dextrose 50% Injectable 25 Gram(s) IV Push once\par  dextrose 50% Injectable 12.5 Gram(s) IV Push once\par  dextrose 50% Injectable 25 Gram(s) IV Push once\par  diltiazem    milliGRAM(s) Oral daily\par  glucagon  Injectable 1 milliGRAM(s) IntraMuscular once\par  heparin   Injectable 5000 Unit(s) SubCutaneous every 12 hours\par  insulin glargine Injectable (LANTUS) 10 Unit(s) SubCutaneous at bedtime\par  insulin lispro (ADMELOG) corrective regimen sliding scale   SubCutaneous three times a day before meals\par  levETIRAcetam  Solution 500 milliGRAM(s) Oral two times a day\par  pantoprazole  Injectable 40 milliGRAM(s) IV Push every 12 hours\par  silver sulfADIAZINE 1% Cream 1 Application(s) Topical every 12 hours\par  sodium phosphate 30 milliMole(s)/500 mL IVPB 30 milliMole(s) IV Intermittent once\par  sodium zirconium cyclosilicate 10 Gram(s) Oral every 8 hours\par  \par  MEDICATIONS  (PRN):\par  acetaminophen     Tablet .. 650 milliGRAM(s) Oral every 6 hours PRN Temp greater or equal to 38C (100.4F), Mild Pain (1 - 3)\par  dextrose Oral Gel 15 Gram(s) Oral once PRN Blood Glucose LESS THAN 70 milliGRAM(s)/deciliter\par  midodrine. 2.5 milliGRAM(s) Oral three times a day PRN SBP < 110\par  sodium chloride 0.9% Bolus. 100 milliLiter(s) IV Bolus every 5 minutes PRN SBP LESS THAN or EQUAL to 80 mmHg\par  sodium chloride 0.9% Bolus. 100 milliLiter(s) IV Bolus every 5 minutes PRN SBP LESS THAN or EQUAL to 100 mmHg\par  \par  \par           \par             7.8  \par  10.45 )-----------( 499      ( 29 Jun 2023 13:32 )\par             23.9 \par  \par  06-28\par  \par  134<L>  |  95<L>  |  61<HH>\par  ----------------------------<  127<H>\par  5.2<H>   |  31  |  2.5<H>\par  \par  Ca    8.7      28 Jun 2023 12:00\par  \par  Procalcitonin, Serum: 2.38 ng/mL [0.02 - 0.10] (05-31-23 @ 05:50)\par  Procalcitonin, Serum: 1.38 ng/mL [0.02 - 0.10] (06-12-23 @ 09:44)\par  Procalcitonin, Serum: 1.12 ng/mL [0.02 - 0.10] (06-11-23 @ 21:02)\par  Ferritin, Serum: 1956 ng/mL [30 - 400] (06-06-23 @ 10:40)\par  \par  a/p \par  \ql\plain\f0\fs24{\*\bkmkstart [id number]}{\*\bkmkend [id number]}\plain\f0\fs20\xdev1980\hich\f0\dbch\f0\loch\f0\fs20 64 yo M with  PMH of ICH (2021) s/p trach and PEG, HTN, HLD, T2DM, CAD s/p stents, Recurrent C diff, BIBEMS from [facility]   for abnormal labs. Patient non-verbal at baseline - limited history. Per NH chart  have a BUN greater than 200 and creatinine of 4.3 on outpatient labs. Outpatient CXR also w/ new L sided pleural effusion. In ED, patient was hypotensive with Afib with   RVR, found to be in acure renal failure, anemic with elevated trops. He was started on Amiodarone GTT, PPI GTT, gastric lavage with coffee ground secretions, started on broad spectrum abx and admitted to MICU\par  While in MICU, course complicated by DKA, s/p insulin drip, MICHELLE started on HD, acute anemia s/p PRBC transfusion, NSTEMI, downgraded to SDU -> floor\par  \par  # Acute Renal Failure, started on HD 6/1\par  Fluid Overload\par  HAGMA\par  - non oliguric, RBUS without hydro \par  - started on HD 6/1 via R IJ [place] \par  - Cr much improved s/p HD yesterday, now 1.9\par  - Nephro on board- last HD 6/20\par  - continue bumex , document accurate UO \par  - c/w midodrine 10mg q8hr\par  - c/w sodium bicarb 1300mg PO TID\par  -  TTE 6/1 - EF 63%, GIDD \par  - On HD , monitor cr closely and fu nephro \par  \par  \pard\plain\f0\fs24\plain\f0\fs20\yohf3030\hich\f0\dbch\f0\loch\f0\fs20 creatinine trend\par  2.5 (06-28-23 @ 12:00)\par  2.0 (06-27-23 @ 06:36)\par  2.6 (06-26-23 @ 08:07)\par  2.4 (06-25-23 @ 08:10)\par  \par  # diarrhea \par  cont dignishield \par  GI PCR Panel, Stool (12.23.21 @ 15:00)\par    Specimen Source: .Stool Feces\par   Culture Results: \par  GI PCR Results: NOT detected\par  *******Please Note:*******\par  GI panel PCR evaluates for:\par  Campylobacter, Plesiomonas shigelloides, Salmonella,\par  Vibrio, Yersinia enterocolitica, Enteroaggregative\par  Escherichia coli (EAEC), Enteropathogenic E.coli (EPEC),\par  Enterotoxigenic E. coli (ETEC) lt/st, Shiga-like\par  toxin-producing E. coli (STEC) stx1/stx2,\par  Shigella/ Enteroinvasive E. coli (EIEC), Cryptosporidium,\par  Cyclospora cayetanensis, Entamoeba histolytica,\par  Giardia lamblia, Adenovirus F 40/41, Astrovirus,\par  Norovirus GI/GII, Rotavirus A, Sapovirus\par  \par  C Diff by PCR Result: NotDetec: Method: CDPCR\par  TOXIGENIC CLOST.DIFFICILE NEGATIVE;\par  027-NAP1-B1 PRESUMPTIVE NEGATIVE.\par   (06.22.23 @ 12:20)\par  \par  \par  \ql\plain\f0\fs24\plain\f0\fs20\ftgm8449\hich\f0\dbch\f0\loch\f0\fs20\par  # chronic respiratory failure, vent dependant via trach , trach care, fu pulm \par  # functional dysphagia, sp peg, peg care \par  # fever, suspected VAP-improved\par  #Candiduria with U cx C tropicalis\par  -  CXR Further significant increase in left-sided opacities/effusion with minimal aeration of the left lung. Right-sided opacities redemonstrated\par    6/11 BCX NGTD \par    6/11 UCX   >100,000 CFU/ml Pseudomonas aeruginosa\par    6/11 sputum   Three or more mixed gram negative rods including\par    Moderate Pseudomonas aeruginosa (Carbapenem Resistant)\par    6/7 BCX NGTD \par  - ID consult 6/14: Recommended switching gentamicin to ceftolozane-tazobactam 2.25g IV x1, followed by 450mg IV q8h (HD dosing) for the treatment of pneumonia and UTI due to carbapenem-resistant Pseudomonas aeruginosa x 7 days - ended \par  - fluconazole, end 6/15\par  \par  # Acute anemia\par  - HgB stable (baseline 8) \par  - evaluated by GI - no evidence of GI bleed (PEG tube flushed with return of bile and no blood)\par  - Monitor H/H closely\par  - transfuse 1 unit \par  - sp multiple units on this hospitalization, last transfusion 6/28\par  \par  \pard\plain\f0\fs24\plain\f0\fs20\kagu5028\hich\f0\dbch\f0\loch\f0\fs20 Hemoglobin: 7.8 g/dL (06-29-23 @ 13:32)\par  Hemoglobin: 6.5 g/dL (06-28-23 @ 12:00)\par  Hemoglobin: 7.6 g/dL (06-27-23 @ 06:36)\par  Hemoglobin: 7.1 g/dL (06-26-23 @ 08:07)\par  Hemoglobin: 7.2 g/dL (06-25-23 @ 19:30)\par  \ql\plain\f0\fs24\plain\f0\fs20\pmmu1988\hich\f0\dbch\f0\loch\f0\fs20\par  # Hyponatremia, likely hypervolemic-resolved\par  - c/w bumex 2mg iv q12hr and HD per nephro\par  - daily BMP\par  \par  # Afib w/ RVR - now rate controlled \par  - currently rate controlled s/p amio gtt\par  - changed amio to 200 q24H \par  - started [id number] mg daily as he went back into afib rvr \par  - now rate controlled\par  \par  # Acute Decompensated CHF\par  CAD s/p stents\par  NSTEMI\par  - Trops 1.80-->1.63-->1.57-->1.55 \par  - seen by cardio 5/31,  c/w asa and statin, medical management for NSTEMI, CAD, c/w Diuresis\par  \par  # DKA, resolved\par  - s/p insulin drip, now on basal/bolus and tube feeds. Monitor FS \par  \par  \pard\plain\f0\fs24\plain\f0\fs20\mqxv4575\hich\f0\dbch\f0\loch\f0\fs20 CAPILLARY BLOOD GLUCOSE\par  \par  POCT Blood Glucose.: 112 mg/dL (29 Jun 2023 12:22)\par  POCT Blood Glucose.: 114 mg/dL (29 Jun 2023 08:02)\par  POCT Blood Glucose.: 142 mg/dL (28 Jun 2023 21:53)\par  POCT Blood Glucose.: 167 mg/dL (28 Jun 2023 17:20)\par  \ql\plain\f0\fs24\plain\f0\fs20\rmif3189\hich\f0\dbch\f0\loch\f0\fs20\par  # Hx ICH \par  Bedbound from NH \par  - c/w keppra 500mg BID for seizure prophylaxis\par  \par  # multiple sacral wounds. sacral and buttock , un-satgeable \par  wound care nurse to reevulate \par  \par  #Progress Note Handoff\par  Pending :  monitor labs, wound care team consult \par  Family discussion: Spoke with wife Mrs Yahir Camacho, updated her and discussed GOC again. she still wants to cont current care, and will wait for nephro decision re long term HD needs \par  Disposition: SNF when stable \par  pt is very high risk for complications of vent and hospitalization \par  time spent 50 min. \plain\f1\fs20\kyaz3064\hich\f1\dbch\f1\loch\f1\cf2\fs20\strike\pard\plain\f0\fs24\plain\f1\fs20\mkif2045\hich\f1\dbch\f1\loch\f1\cf2\fs20\strike\plain\f1\fs20\egoa5477\hich\f1\dbch\f1\loch\f1\cf2\fs20\plain\f0\fs20\kkdb4514\hich\f0\dbch\f0\loch\f0\fs20\par  \par  \par  \par  \par  \par  \par  \par  \par  \par  \par  \par  \par  \par  \ql\plain\f0\fs24\plain\f0\fs20\qmhf7199\hich\f0\dbch\f0\loch\f0\fs20\par  }   pt seen and examined.     My notes supersede resident's notes in case of discrepancy       ROS: no fever, still has diarrhea, non verbal, info per nursing     Vital Signs Last 24 Hrs  T(C): 36.8 (29 Jun 2023 05:38), Max: 36.8 (29 Jun 2023 05:38)  T(F): 98.3 (29 Jun 2023 05:38), Max: 98.3 (29 Jun 2023 05:38)  HR: 84 (29 Jun 2023 05:38) (80 - 88)  BP: 128/67 (29 Jun 2023 05:38) (114/60 - 128/67)  BP(mean): --  RR: 18 (29 Jun 2023 05:38) (18 - 18)  SpO2: 100% (29 Jun 2023 04:22) (100% - 100%)    Parameters below as of 28 Jun 2023 15:55    O2 Concentration (%): 40    physical exam  constitutional NAD, blinks, non verbal non communicating , Respiratory trach in place,  lungs CTA, CVS heart RRR, GI: abdomen peg in place, Soft NT, ND, BS+, skin: unstageable sacral and buttock pressure ulcers   neuro exam pt cannot participate, has contracture     MEDICATIONS  (STANDING):  acetylcysteine 20%  Inhalation 4 milliLiter(s) Inhalation every 6 hours  albuterol/ipratropium for Nebulization 3 milliLiter(s) Nebulizer every 6 hours  aMIOdarone    Tablet 200 milliGRAM(s) Oral daily  aMIOdarone    Tablet   Oral   aspirin  chewable 81 milliGRAM(s) Oral daily  atorvastatin 80 milliGRAM(s) Oral at bedtime  buMETAnide Injectable 2 milliGRAM(s) IV Push every 12 hours  chlorhexidine 0.12% Liquid 15 milliLiter(s) Oral Mucosa two times a day  chlorhexidine 2% Cloths 1 Application(s) Topical <User Schedule>  collagenase Ointment 1 Application(s) Topical two times a day  dextrose 5%. 1000 milliLiter(s) (100 mL/Hr) IV Continuous <Continuous>  dextrose 5%. 1000 milliLiter(s) (50 mL/Hr) IV Continuous <Continuous>  dextrose 50% Injectable 25 Gram(s) IV Push once  dextrose 50% Injectable 12.5 Gram(s) IV Push once  dextrose 50% Injectable 25 Gram(s) IV Push once  diltiazem    milliGRAM(s) Oral daily  glucagon  Injectable 1 milliGRAM(s) IntraMuscular once  heparin   Injectable 5000 Unit(s) SubCutaneous every 12 hours  insulin glargine Injectable (LANTUS) 10 Unit(s) SubCutaneous at bedtime  insulin lispro (ADMELOG) corrective regimen sliding scale   SubCutaneous three times a day before meals  levETIRAcetam  Solution 500 milliGRAM(s) Oral two times a day  pantoprazole  Injectable 40 milliGRAM(s) IV Push every 12 hours  silver sulfADIAZINE 1% Cream 1 Application(s) Topical every 12 hours  sodium phosphate 30 milliMole(s)/500 mL IVPB 30 milliMole(s) IV Intermittent once  sodium zirconium cyclosilicate 10 Gram(s) Oral every 8 hours    MEDICATIONS  (PRN):  acetaminophen     Tablet .. 650 milliGRAM(s) Oral every 6 hours PRN Temp greater or equal to 38C (100.4F), Mild Pain (1 - 3)  dextrose Oral Gel 15 Gram(s) Oral once PRN Blood Glucose LESS THAN 70 milliGRAM(s)/deciliter  midodrine. 2.5 milliGRAM(s) Oral three times a day PRN SBP < 110  sodium chloride 0.9% Bolus. 100 milliLiter(s) IV Bolus every 5 minutes PRN SBP LESS THAN or EQUAL to 80 mmHg  sodium chloride 0.9% Bolus. 100 milliLiter(s) IV Bolus every 5 minutes PRN SBP LESS THAN or EQUAL to 100 mmHg                            7.8    10.45 )-----------( 499      ( 29 Jun 2023 13:32 )             23.9     06-28    134<L>  |  95<L>  |  61<HH>  ----------------------------<  127<H>  5.2<H>   |  31  |  2.5<H>    Ca    8.7      28 Jun 2023 12:00    Procalcitonin, Serum: 2.38 ng/mL [0.02 - 0.10] (05-31-23 @ 05:50)  Procalcitonin, Serum: 1.38 ng/mL [0.02 - 0.10] (06-12-23 @ 09:44)  Procalcitonin, Serum: 1.12 ng/mL [0.02 - 0.10] (06-11-23 @ 21:02)  Ferritin, Serum: 1956 ng/mL [30 - 400] (06-06-23 @ 10:40)    a/p   62 yo M with  PMH of ICH (2021) s/p trach and PEG, HTN, HLD, T2DM, CAD s/p stents, Recurrent C diff, BIBEMS from [facility] for abnormal labs. Patient non-verbal at baseline - limited history. Per NH chart  have a BUN greater than 200 and creatinine of 4.3 on outpatient labs. Outpatient CXR also w/ new L sided pleural effusion. In ED, patient was hypotensive with Afib with RVR, found to be in acure renal failure, anemic with elevated trops. He was started on Amiodarone GTT, PPI GTT, gastric lavage with coffee ground secretions, started on broad spectrum abx and admitted to MICU  While in MICU, course complicated by DKA, s/p insulin drip, MICHELLE started on HD, acute anemia s/p PRBC transfusion, NSTEMI, downgraded to SDU -> floor    # Acute Renal Failure, started on HD 6/1  Fluid Overload  HAGMA  - non oliguric, RBUS without hydro   - started on HD 6/1 via R IJ [place]   - Cr much improved s/p HD yesterday, now 1.9  - Nephro on board- last HD 6/20  - continue bumex , document accurate UO   - c/w midodrine 10mg q8hr  - c/w sodium bicarb 1300mg PO TID  -  TTE 6/1 - EF 63%, GIDD   - On HD , monitor cr closely and fu nephro     creatinine trend  2.5 (06-28-23 @ 12:00)  2.0 (06-27-23 @ 06:36)  2.6 (06-26-23 @ 08:07)  2.4 (06-25-23 @ 08:10)    # diarrhea   cont dignishield   GI PCR Panel, Stool (12.23.21 @ 15:00)    Specimen Source: .Stool Feces   Culture Results:   GI PCR Results: NOT detected  *******Please Note:*******  GI panel PCR evaluates for:  Campylobacter, Plesiomonas shigelloides, Salmonella,  Vibrio, Yersinia enterocolitica, Enteroaggregative  Escherichia coli (EAEC), Enteropathogenic E.coli (EPEC),  Enterotoxigenic E. coli (ETEC) lt/st, Shiga-like  toxin-producing E. coli (STEC) stx1/stx2,  Shigella/ Enteroinvasive E. coli (EIEC), Cryptosporidium,  Cyclospora cayetanensis, Entamoeba histolytica,  Giardia lamblia, Adenovirus F 40/41, Astrovirus,  Norovirus GI/GII, Rotavirus A, Sapovirus    C Diff by PCR Result: NotDetec: Method: CDPCR  TOXIGENIC CLOST.DIFFICILE NEGATIVE;  027-NAP1-B1 PRESUMPTIVE NEGATIVE.   (06.22.23 @ 12:20)        # chronic respiratory failure, vent dependant via trach , trach care, fu pulm   # functional dysphagia, sp peg, peg care   # fever, suspected VAP-improved  #Candiduria with U cx C tropicalis  -  CXR Further significant increase in left-sided opacities/effusion with minimal aeration of the left lung. Right-sided opacities redemonstrated    6/11 BCX NGTD     6/11 UCX   >100,000 CFU/ml Pseudomonas aeruginosa    6/11 sputum   Three or more mixed gram negative rods including    Moderate Pseudomonas aeruginosa (Carbapenem Resistant)    6/7 BCX NGTD   - ID consult 6/14: Recommended switching gentamicin to ceftolozane-tazobactam 2.25g IV x1, followed by 450mg IV q8h (HD dosing) for the treatment of pneumonia and UTI due to carbapenem-resistant Pseudomonas aeruginosa x 7 days - ended   - fluconazole, end 6/15    # Acute anemia  - HgB stable (baseline 8)   - evaluated by GI - no evidence of GI bleed (PEG tube flushed with return of bile and no blood)  - Monitor H/H closely  - transfuse 1 unit   - sp multiple units on this hospitalization, last transfusion 6/28    Hemoglobin: 7.8 g/dL (06-29-23 @ 13:32)  Hemoglobin: 6.5 g/dL (06-28-23 @ 12:00)  Hemoglobin: 7.6 g/dL (06-27-23 @ 06:36)  Hemoglobin: 7.1 g/dL (06-26-23 @ 08:07)  Hemoglobin: 7.2 g/dL (06-25-23 @ 19:30)    # Hyponatremia, likely hypervolemic-resolved  - c/w bumex 2mg iv q12hr and HD per nephro  - daily BMP    # Afib w/ RVR - now rate controlled   - currently rate controlled s/p amio gtt  - changed amio to 200 q24H   - started [id number] mg daily as he went back into afib rvr   - now rate controlled    # Acute Decompensated CHF  CAD s/p stents  NSTEMI  - Trops 1.80-->1.63-->1.57-->1.55   - seen by cardio 5/31,  c/w asa and statin, medical management for NSTEMI, CAD, c/w Diuresis    # DKA, resolved  - s/p insulin drip, now on basal/bolus and tube feeds. Monitor FS     CAPILLARY BLOOD GLUCOSE    POCT Blood Glucose.: 112 mg/dL (29 Jun 2023 12:22)  POCT Blood Glucose.: 114 mg/dL (29 Jun 2023 08:02)  POCT Blood Glucose.: 142 mg/dL (28 Jun 2023 21:53)  POCT Blood Glucose.: 167 mg/dL (28 Jun 2023 17:20)    # Hx ICH   Bedbound from NH   - c/w keppra 500mg BID for seizure prophylaxis    # multiple sacral wounds. sacral and buttock , un-satgeable   wound care nurse to reevulate     # functional paraplegia , hx of ICH, has upper ext contracture total care     #Progress Note Handoff  Pending :  monitor labs, wound care team consult   Family discussion: Spoke with wife Mrs Yahir Camacho, updated her and discussed GOC again. she still wants to cont current care, and will wait for nephro decision re long term HD needs   Disposition: SNF when stable   pt is very high risk for complications of vent and hospitalization   time spent 50 min.

## 2023-06-29 NOTE — CONSULT NOTE ADULT - SUBJECTIVE AND OBJECTIVE BOX
62 yo M with  PMH of ICH (2021) s/p trach and PEG, HTN, HLD, T2DM, CAD s/p stents, Recurrent C diff, BIBEMS from [facility] for abnormal labs. Patient non-verbal at baseline - limited history. Per NH chart  have a BUN greater than 200 and creatinine of 4.3 on outpatient labs. Outpatient CXR also w/ new L sided pleural effusion. With EMS patient was also found to be in irregular rhythm, no known history of A-fib or a flutter.  In ED patient had borderline BP, and was in Atrial fibrillation. He was started on amiodarone gtt. Labs showed , Cr. 4.1, Hb 6.6. Trops 1.8. He was given 1U PRBC, gastric lavage revealed coffee ground emesis w/no active bleeding. He was started on PPI drip and GI consulted in ED. He was given IV aztreonam and vancomycin.   Patient was admitted to ICU for management of Sepsis likely from UTI, MICHELLE likely prerenal and NSTEMI.     Urology:  Urology called for difficult Barney catheter placement, RN had difficulty on CIC with resistance on straight cath placement. BS ( ? random scan) 458 cc.   came to place Barney catheter, Pt. transferred to HD, spoke with HD RN who states that no other procedures can be done during HD. Pt. will be back to the floor in 2 hrs.   Pt. had Barney catheter, seen  on prior RBUS.   Seen by Urology on 9/10/22  for MICHELLE , Urinary retention and CT A/P finding from (2021) Symmetric renal enhancement without hydronephrosis. Bilateral renal cysts and hypodensities too small to characterize. A 1.7 cm exophytic left upper pole hypoattenuating lesion is indeterminate.  A 2.4 cm nodular soft tissue density along the right posterior bladder wall appears separate from the prostate gland and not seen on pelvic CT from 2017. Enlarged prostate protruding into the base of bladder.  Repeat CT A/P findings 10/9/22 : Under distended urinary bladder limits evaluation. Circumferential bladder wall thickening with irregular posterior bladder wall masslike thickening.  At that time Dr. Pruitt discussed the bladder findings with the patient's wife.  She declined cystoscopy possible TURBT at this time until he is well enough to undergo surgical procedures.       PAST MEDICAL & SURGICAL HISTORY:  HTN (hypertension)  Diabetes mellitus  H/O intracranial hemorrhage  H/O tracheostomy  PEG (percutaneous endoscopic gastrostomy) status  Hyperlipidemia      MEDICATIONS  (STANDING):  acetylcysteine 20%  Inhalation 4 milliLiter(s) Inhalation every 6 hours  albuterol/ipratropium for Nebulization 3 milliLiter(s) Nebulizer every 6 hours  aMIOdarone    Tablet 200 milliGRAM(s) Oral daily  aMIOdarone    Tablet   Oral   aspirin  chewable 81 milliGRAM(s) Oral daily  atorvastatin 80 milliGRAM(s) Oral at bedtime  buMETAnide Injectable 2 milliGRAM(s) IV Push every 12 hours  chlorhexidine 0.12% Liquid 15 milliLiter(s) Oral Mucosa two times a day  chlorhexidine 2% Cloths 1 Application(s) Topical <User Schedule>  collagenase Ointment 1 Application(s) Topical two times a day  dextrose 5%. 1000 milliLiter(s) (100 mL/Hr) IV Continuous <Continuous>  dextrose 5%. 1000 milliLiter(s) (50 mL/Hr) IV Continuous <Continuous>  dextrose 50% Injectable 25 Gram(s) IV Push once  dextrose 50% Injectable 12.5 Gram(s) IV Push once  dextrose 50% Injectable 25 Gram(s) IV Push once  diltiazem    milliGRAM(s) Oral daily  glucagon  Injectable 1 milliGRAM(s) IntraMuscular once  heparin   Injectable 5000 Unit(s) SubCutaneous every 12 hours  insulin glargine Injectable (LANTUS) 10 Unit(s) SubCutaneous at bedtime  insulin lispro (ADMELOG) corrective regimen sliding scale   SubCutaneous three times a day before meals  levETIRAcetam  Solution 500 milliGRAM(s) Oral two times a day  pantoprazole  Injectable 40 milliGRAM(s) IV Push every 12 hours  silver sulfADIAZINE 1% Cream 1 Application(s) Topical every 12 hours  sodium phosphate 30 milliMole(s)/500 mL IVPB 30 milliMole(s) IV Intermittent once  sodium zirconium cyclosilicate 10 Gram(s) Oral every 8 hours    MEDICATIONS  (PRN):  acetaminophen     Tablet .. 650 milliGRAM(s) Oral every 6 hours PRN Temp greater or equal to 38C (100.4F), Mild Pain (1 - 3)  dextrose Oral Gel 15 Gram(s) Oral once PRN Blood Glucose LESS THAN 70 milliGRAM(s)/deciliter  midodrine. 2.5 milliGRAM(s) Oral three times a day PRN SBP < 110  sodium chloride 0.9% Bolus. 100 milliLiter(s) IV Bolus every 5 minutes PRN SBP LESS THAN or EQUAL to 80 mmHg  sodium chloride 0.9% Bolus. 100 milliLiter(s) IV Bolus every 5 minutes PRN SBP LESS THAN or EQUAL to 100 mmHg      Allergies    penicillin (Other)    SOCIAL HISTORY: No illicit drug use    FAMILY HISTORY:  No pertinent family hx     REVIEW OF SYSTEMS      [ x] Due to altered mental status/intubation, subjective information were not able to be obtained from patient. History was obtained, to the extent possible, from review of the chart and collateral sources of information.    Vital Signs Last 24 Hrs  T(C): 36.7 (29 Jun 2023 12:40), Max: 36.8 (29 Jun 2023 05:38)  T(F): 98.1 (29 Jun 2023 12:40), Max: 98.3 (29 Jun 2023 05:38)  HR: 76 (29 Jun 2023 12:40) (76 - 84)  BP: 133/72 (29 Jun 2023 12:40) (114/60 - 133/72)  RR: 18 (29 Jun 2023 12:40) (18 - 18)  SpO2: 100% (29 Jun 2023 12:40) (100% - 100%)        PHYSICAL EXAM:             I&O's Detail    28 Jun 2023 07:01  -  29 Jun 2023 07:00  --------------------------------------------------------  IN:    Enteral Tube Flush: 120 mL    Peptamen A.F.: 750 mL  Total IN: 870 mL    OUT:  Total OUT: 0 mL    Total NET: 870 mL      LABS:                        7.8    10.45 )-----------( 499      ( 29 Jun 2023 13:32 )             23.9     06-29    133<L>  |  93<L>  |  71<HH>  ----------------------------<  97  5.5<H>   |  28  |  2.7<H>    Ca    8.8      29 Jun 2023 13:32  Phos  1.8     06-29  Mg     2.1     06-29    TPro  5.4<L>  /  Alb  2.0<L>  /  TBili  0.3  /  DBili  x   /  AST  20  /  ALT  19  /  AlkPhos  192<H>  06-29       Urinalysis (06.11.23 @ 18:25)    Blood, Urine: Moderate   Glucose Qualitative, Urine: Negative   pH Urine: 8.0   Color: Yellow   Urine Appearance: Slightly Turbid   Bilirubin: Small   Ketone - Urine: Trace   Specific Gravity: 1.016   Protein, Urine: 100 mg/dL   Urobilinogen: 6 mg/dL   Nitrite: Negative   Leukocyte Esterase Concentration: Large    Culture - Urine (06.11.23 @ 18:25)    -  Aztreonam: R >16   -  Amikacin: S <=16   -  Tobramycin: S <=2   -  Piperacillin/Tazobactam: R >64   -  Levofloxacin: R >4   -  Meropenem: R >8   -  Gentamicin: I 8   -  Imipenem: R >8   -  Ciprofloxacin: R >2   -  Ceftazidime: R >16   -  Cefepime: R >16   -  Resistance Gene to Carbapenem: Nondet   -  Ceftolozane/tazobactam: S <=2   -  Ceftazidime/Avibactam: S 8   Specimen Source: Catheterized Catheterized   Culture Results:   >100,000 CFU/ml Pseudomonas aeruginosa (Carbapenem Resistant)   Organism Identification: Pseudomonas aeruginosa (Carbapenem Resistant)  Pseudomonas aeruginosa (Carbapenem Resistant)   Organism: Pseudomonas aeruginosa (Carbapenem Resistant)   Organism: Pseudomonas aeruginosa (Carbapenem Resistant)   Method Type: TAMIE   Method Type: [facility]        RADIOLOGY & ADDITIONAL STUDIES:  < from: US Kidney and Bladder (05.31.23 @ 00:33) >    ACC: [id number] EXAM:  US KIDNEYS AND BLADDER   ORDERED BY: JASEN CARROLL     PROCEDURE DATE:  05/31/2023          INTERPRETATION:  CLINICAL INFORMATION: Renal failure.    COMPARISON: CT scan dated October 8, 2022.    TECHNIQUE: Sonography of the kidneys and bladder.    FINDINGS:    Right kidney: 11.8 x 5.4 x 6.1 cm. No hydronephrosis or calculi.    Left kidney:  12.8 x 5.8 x 5.4 cm. No hydronephrosis or calculi.    Urinary bladder: Barney catheter is seen within a nondistended urinary   bladder.    The prostate is not visualized.    Ascites is noted.    IMPRESSION:    Normal renal ultrasound.        --- End of Report ---      ANDRES CUI MD; Attending Interventional Radiologist  This document has been electronically signed. May 31 2023  7:44AM    < end of copied text >   62 yo M with  PMH of ICH (2021) s/p trach and PEG, HTN, HLD, T2DM, CAD s/p stents, Recurrent C diff, BIBEMS from [facility] for abnormal labs. Patient non-verbal at baseline - limited history. Per NH chart  have a BUN greater than 200 and creatinine of 4.3 on outpatient labs. Outpatient CXR also w/ new L sided pleural effusion. With EMS patient was also found to be in irregular rhythm, no known history of A-fib or a flutter.  In ED patient had borderline BP, and was in Atrial fibrillation. He was started on amiodarone gtt. Labs showed , Cr. 4.1, Hb 6.6. Trops 1.8. He was given 1U PRBC, gastric lavage revealed coffee ground emesis w/no active bleeding. He was started on PPI drip and GI consulted in ED. He was given IV aztreonam and vancomycin.   Patient was admitted to ICU for management of Sepsis likely from UTI, MICHELLE likely prerenal and NSTEMI.     Urology:  Urology called for difficult Barney catheter placement, RN had difficulty on CIC with resistance on straight cath placement. BS ( ? random scan) 458 cc.   came to place Barney catheter, Pt. transferred to HD, spoke with HD RN who states that no other procedures can be done during HD. Pt. will be back to the floor in 2 hrs.   Pt. had Barney catheter, seen  on prior RBUS.   Seen by Urology on 9/10/22  for MICHELLE , Urinary retention and CT A/P finding from (2021) Symmetric renal enhancement without hydronephrosis. Bilateral renal cysts and hypodensities too small to characterize. A 1.7 cm exophytic left upper pole hypoattenuating lesion is indeterminate.  A 2.4 cm nodular soft tissue density along the right posterior bladder wall appears separate from the prostate gland and not seen on pelvic CT from 2017. Enlarged prostate protruding into the base of bladder.  Repeat CT A/P findings 10/9/22 : Under distended urinary bladder limits evaluation. Circumferential bladder wall thickening with irregular posterior bladder wall masslike thickening.  At that time Dr. Pruitt discussed the bladder findings with the patient's wife.  She declined cystoscopy possible TURBT at this time until he is well enough to undergo surgical procedures.       PAST MEDICAL & SURGICAL HISTORY:  HTN (hypertension)  Diabetes mellitus  H/O intracranial hemorrhage  H/O tracheostomy  PEG (percutaneous endoscopic gastrostomy) status  Hyperlipidemia      MEDICATIONS  (STANDING):  acetylcysteine 20%  Inhalation 4 milliLiter(s) Inhalation every 6 hours  albuterol/ipratropium for Nebulization 3 milliLiter(s) Nebulizer every 6 hours  aMIOdarone    Tablet 200 milliGRAM(s) Oral daily  aMIOdarone    Tablet   Oral   aspirin  chewable 81 milliGRAM(s) Oral daily  atorvastatin 80 milliGRAM(s) Oral at bedtime  buMETAnide Injectable 2 milliGRAM(s) IV Push every 12 hours  chlorhexidine 0.12% Liquid 15 milliLiter(s) Oral Mucosa two times a day  chlorhexidine 2% Cloths 1 Application(s) Topical <User Schedule>  collagenase Ointment 1 Application(s) Topical two times a day  dextrose 5%. 1000 milliLiter(s) (100 mL/Hr) IV Continuous <Continuous>  dextrose 5%. 1000 milliLiter(s) (50 mL/Hr) IV Continuous <Continuous>  dextrose 50% Injectable 25 Gram(s) IV Push once  dextrose 50% Injectable 12.5 Gram(s) IV Push once  dextrose 50% Injectable 25 Gram(s) IV Push once  diltiazem    milliGRAM(s) Oral daily  glucagon  Injectable 1 milliGRAM(s) IntraMuscular once  heparin   Injectable 5000 Unit(s) SubCutaneous every 12 hours  insulin glargine Injectable (LANTUS) 10 Unit(s) SubCutaneous at bedtime  insulin lispro (ADMELOG) corrective regimen sliding scale   SubCutaneous three times a day before meals  levETIRAcetam  Solution 500 milliGRAM(s) Oral two times a day  pantoprazole  Injectable 40 milliGRAM(s) IV Push every 12 hours  silver sulfADIAZINE 1% Cream 1 Application(s) Topical every 12 hours  sodium phosphate 30 milliMole(s)/500 mL IVPB 30 milliMole(s) IV Intermittent once  sodium zirconium cyclosilicate 10 Gram(s) Oral every 8 hours    MEDICATIONS  (PRN):  acetaminophen     Tablet .. 650 milliGRAM(s) Oral every 6 hours PRN Temp greater or equal to 38C (100.4F), Mild Pain (1 - 3)  dextrose Oral Gel 15 Gram(s) Oral once PRN Blood Glucose LESS THAN 70 milliGRAM(s)/deciliter  midodrine. 2.5 milliGRAM(s) Oral three times a day PRN SBP < 110  sodium chloride 0.9% Bolus. 100 milliLiter(s) IV Bolus every 5 minutes PRN SBP LESS THAN or EQUAL to 80 mmHg  sodium chloride 0.9% Bolus. 100 milliLiter(s) IV Bolus every 5 minutes PRN SBP LESS THAN or EQUAL to 100 mmHg      Allergies    penicillin (Other)    SOCIAL HISTORY: No illicit drug use    FAMILY HISTORY:  No pertinent family hx     REVIEW OF SYSTEMS      [ x] Due to altered mental status/intubation, subjective information were not able to be obtained from patient. History was obtained, to the extent possible, from review of the chart and collateral sources of information.    Vital Signs Last 24 Hrs  T(C): 36.7 (29 Jun 2023 12:40), Max: 36.8 (29 Jun 2023 05:38)  T(F): 98.1 (29 Jun 2023 12:40), Max: 98.3 (29 Jun 2023 05:38)  HR: 76 (29 Jun 2023 12:40) (76 - 84)  BP: 133/72 (29 Jun 2023 12:40) (114/60 - 133/72)  RR: 18 (29 Jun 2023 12:40) (18 - 18)  SpO2: 100% (29 Jun 2023 12:40) (100% - 100%)          I&O's Detail    28 Jun 2023 07:01  -  29 Jun 2023 07:00  --------------------------------------------------------  IN:    Enteral Tube Flush: 120 mL    Peptamen A.F.: 750 mL  Total IN: 870 mL    OUT:  Total OUT: 0 mL    Total NET: 870 mL      LABS:                        7.8    10.45 )-----------( 499      ( 29 Jun 2023 13:32 )             23.9     06-29    133<L>  |  93<L>  |  71<HH>  ----------------------------<  97  5.5<H>   |  28  |  2.7<H>    Ca    8.8      29 Jun 2023 13:32  Phos  1.8     06-29  Mg     2.1     06-29    TPro  5.4<L>  /  Alb  2.0<L>  /  TBili  0.3  /  DBili  x   /  AST  20  /  ALT  19  /  AlkPhos  192<H>  06-29       Urinalysis (06.11.23 @ 18:25)    Blood, Urine: Moderate   Glucose Qualitative, Urine: Negative   pH Urine: 8.0   Color: Yellow   Urine Appearance: Slightly Turbid   Bilirubin: Small   Ketone - Urine: Trace   Specific Gravity: 1.016   Protein, Urine: 100 mg/dL   Urobilinogen: 6 mg/dL   Nitrite: Negative   Leukocyte Esterase Concentration: Large    Culture - Urine (06.11.23 @ 18:25)    -  Aztreonam: R >16   -  Amikacin: S <=16   -  Tobramycin: S <=2   -  Piperacillin/Tazobactam: R >64   -  Levofloxacin: R >4   -  Meropenem: R >8   -  Gentamicin: I 8   -  Imipenem: R >8   -  Ciprofloxacin: R >2   -  Ceftazidime: R >16   -  Cefepime: R >16   -  Resistance Gene to Carbapenem: Nondet   -  Ceftolozane/tazobactam: S <=2   -  Ceftazidime/Avibactam: S 8   Specimen Source: Catheterized Catheterized   Culture Results:   >100,000 CFU/ml Pseudomonas aeruginosa (Carbapenem Resistant)   Organism Identification: Pseudomonas aeruginosa (Carbapenem Resistant)  Pseudomonas aeruginosa (Carbapenem Resistant)   Organism: Pseudomonas aeruginosa (Carbapenem Resistant)   Organism: Pseudomonas aeruginosa (Carbapenem Resistant)   Method Type: TAMIE   [facility]        RADIOLOGY & ADDITIONAL STUDIES:  < from: US Kidney and Bladder (05.31.23 @ 00:33) >    ACC: [id number] EXAM:  US KIDNEYS AND BLADDER   ORDERED BY: JASEN CARROLL     PROCEDURE DATE:  05/31/2023          INTERPRETATION:  CLINICAL INFORMATION: Renal failure.    COMPARISON: CT scan dated October 8, 2022.    TECHNIQUE: Sonography of the kidneys and bladder.    FINDINGS:    Right kidney: 11.8 x 5.4 x 6.1 cm. No hydronephrosis or calculi.    Left kidney:  12.8 x 5.8 x 5.4 cm. No hydronephrosis or calculi.    Urinary bladder: Barney catheter is seen within a nondistended urinary   bladder.    The prostate is not visualized.    Ascites is noted.    IMPRESSION:    Normal renal ultrasound.        --- End of Report ---      ANDRES CUI MD; Attending Interventional Radiologist  This document has been electronically signed. May 31 2023  7:44AM    < end of copied text >

## 2023-06-29 NOTE — PROGRESS NOTE ADULT - ASSESSMENT
IMPRESSION:    Sepsis present on admission resolved  Candida UTI sp tx  Pseudomonas PNA sp abx  anemia  left side atelectasis improved  Bilateral pleural effusions likely volume overload   MICHELLE on CKD III on dialysis   NSTEMI likely type 2  Hyponatremia resolved   Paroxysmal Afib  Acute on chronic anemia suspected UGI Bleed  Chronic respiratory failure  H/o ICH s/p trach and PEG  H/o CAD      PLAN:    CNS: Avoid CNS depressants     HEENT: Oral and trach care    PULMONARY: HOB 45. Aspiration.  No vent changes.  Goal saturation 92-96%. Vent dependents.  Pulmonary toilet. not weanable    CARDIOVASCULAR: Avoid overload.      GI:  Feeding  Bowel regimen     RENAL: trend CMP.  Correct as needed.  Nephrology following. HD    INFECTIOUS DISEASE: ABX per ID.      HEMATOLOGICAL: DVT prophylaxis.   trend CBC    ENDOCRINE: Follow up FS. Insulin protocol if needed.    DNR    Poor prognosis

## 2023-06-29 NOTE — PROGRESS NOTE ADULT - SUBJECTIVE AND OBJECTIVE BOX
seen and examined  24 h events noted   trach/vent       PAST HISTORY  --------------------------------------------------------------------------------  No significant changes to PMH, PSH, FHx, SHx, unless otherwise noted    ALLERGIES & MEDICATIONS  --------------------------------------------------------------------------------  Allergies    penicillin (Other)    Intolerances      Standing Inpatient Medications  acetylcysteine 20%  Inhalation 4 milliLiter(s) Inhalation every 6 hours  albuterol/ipratropium for Nebulization 3 milliLiter(s) Nebulizer every 6 hours  aMIOdarone    Tablet 200 milliGRAM(s) Oral daily  aMIOdarone    Tablet   Oral   aspirin  chewable 81 milliGRAM(s) Oral daily  atorvastatin 80 milliGRAM(s) Oral at bedtime  buMETAnide Injectable 2 milliGRAM(s) IV Push every 12 hours  chlorhexidine 0.12% Liquid 15 milliLiter(s) Oral Mucosa two times a day  chlorhexidine 2% Cloths 1 Application(s) Topical <User Schedule>  collagenase Ointment 1 Application(s) Topical two times a day  dextrose 5%. 1000 milliLiter(s) IV Continuous <Continuous>  dextrose 5%. 1000 milliLiter(s) IV Continuous <Continuous>  dextrose 50% Injectable 25 Gram(s) IV Push once  dextrose 50% Injectable 12.5 Gram(s) IV Push once  dextrose 50% Injectable 25 Gram(s) IV Push once  diltiazem    milliGRAM(s) Oral daily  glucagon  Injectable 1 milliGRAM(s) IntraMuscular once  heparin   Injectable 5000 Unit(s) SubCutaneous every 12 hours  insulin glargine Injectable (LANTUS) 10 Unit(s) SubCutaneous at bedtime  insulin lispro (ADMELOG) corrective regimen sliding scale   SubCutaneous three times a day before meals  levETIRAcetam  Solution 500 milliGRAM(s) Oral two times a day  pantoprazole  Injectable 40 milliGRAM(s) IV Push every 12 hours  silver sulfADIAZINE 1% Cream 1 Application(s) Topical every 12 hours  sodium phosphate 30 milliMole(s)/500 mL IVPB 30 milliMole(s) IV Intermittent once  sodium zirconium cyclosilicate 10 Gram(s) Oral every 8 hours    PRN Inpatient Medications  acetaminophen     Tablet .. 650 milliGRAM(s) Oral every 6 hours PRN  dextrose Oral Gel 15 Gram(s) Oral once PRN  midodrine. 2.5 milliGRAM(s) Oral three times a day PRN  sodium chloride 0.9% Bolus. 100 milliLiter(s) IV Bolus every 5 minutes PRN  sodium chloride 0.9% Bolus. 100 milliLiter(s) IV Bolus every 5 minutes PRN        VITALS/PHYSICAL EXAM  --------------------------------------------------------------------------------  T(C): 36.8 (06-29-23 @ 05:38), Max: 36.8 (06-29-23 @ 05:38)  HR: 84 (06-29-23 @ 05:38) (80 - 88)  BP: 128/67 (06-29-23 @ 05:38) (114/60 - 128/67)  RR: 18 (06-29-23 @ 05:38) (18 - 18)  SpO2: 100% (06-29-23 @ 04:22) (100% - 100%)  Wt(kg): --        06-28-23 @ 07:01  -  06-29-23 @ 07:00  --------------------------------------------------------  IN: 870 mL / OUT: 0 mL / NET: 870 mL      Physical Exam:  	Gen: trach/vent   	Pulm: B/L aline   	CV:  S1S2; no rub  	Abd: +distended  	Vascular access:    LABS/STUDIES  --------------------------------------------------------------------------------              6.5    12.06 >-----------<  509      [06-28-23 @ 12:00]              20.8     134  |  95  |  61  ----------------------------<  127      [06-28-23 @ 12:00]  5.2   |  31  |  2.5        Ca     8.7     [06-28-23 @ 12:00]      Creatinine Trend:  SCr 2.5 [06-28 @ 12:00]  SCr 2.0 [06-27 @ 06:36]  SCr 2.6 [06-26 @ 08:07]  SCr 2.4 [06-25 @ 08:10]  SCr 2.7 [06-23 @ 08:28]    Urinalysis - [06-28-23 @ 12:00]      Color  / Appearance  / SG  / pH       Gluc 127 / Ketone   / Bili  / Urobili        Blood  / Protein  / Leuk Est  / Nitrite       RBC  / WBC  / Hyaline  / Gran  / Sq Epi  / Non Sq Epi  / Bacteria       Iron 51, TIBC 111, %sat 46      [06-06-23 @ 10:40]  Ferritin 1956      [06-06-23 @ 10:40]  Vitamin D (25OH) 70      [06-03-23 @ 06:50]    HBsAb <3.0      [06-01-23 @ 22:50]  HBsAb Nonreact      [06-01-23 @ 22:50]  HBsAg Nonreact      [06-01-23 @ 22:50]  HBcAb Nonreact      [06-01-23 @ 22:50]  HIV Nonreact      [06-08-23 @ 16:00]  HIV Nonreact      [06-08-23 @ 12:20]

## 2023-06-29 NOTE — PROGRESS NOTE ADULT - SUBJECTIVE AND OBJECTIVE BOX
24H events:    Patient is a 63y old Male who presents with a chief complaint of Abnormal labs (29 Jun 2023 13:57)    Primary diagnosis of MICHELLE (acute kidney injury)       Today is hospital day 30d. This morning patient was seen and examined at bedside, resting comfortably in bed.      PAST MEDICAL & SURGICAL HISTORY  HTN (hypertension)    Diabetes mellitus    H/O intracranial hemorrhage    H/O tracheostomy    PEG (percutaneous endoscopic gastrostomy) status    Hyperlipidemia      SOCIAL HISTORY:  Social History:      ALLERGIES:  penicillin (Other)    MEDICATIONS:  STANDING MEDICATIONS  acetylcysteine 20%  Inhalation 4 milliLiter(s) Inhalation every 6 hours  albuterol/ipratropium for Nebulization 3 milliLiter(s) Nebulizer every 6 hours  aMIOdarone    Tablet 200 milliGRAM(s) Oral daily  aMIOdarone    Tablet   Oral   aspirin  chewable 81 milliGRAM(s) Oral daily  atorvastatin 80 milliGRAM(s) Oral at bedtime  buMETAnide Injectable 2 milliGRAM(s) IV Push every 12 hours  chlorhexidine 0.12% Liquid 15 milliLiter(s) Oral Mucosa two times a day  chlorhexidine 2% Cloths 1 Application(s) Topical <User Schedule>  collagenase Ointment 1 Application(s) Topical two times a day  dextrose 5%. 1000 milliLiter(s) IV Continuous <Continuous>  dextrose 5%. 1000 milliLiter(s) IV Continuous <Continuous>  dextrose 50% Injectable 25 Gram(s) IV Push once  dextrose 50% Injectable 12.5 Gram(s) IV Push once  dextrose 50% Injectable 25 Gram(s) IV Push once  diltiazem    milliGRAM(s) Oral daily  glucagon  Injectable 1 milliGRAM(s) IntraMuscular once  heparin   Injectable 5000 Unit(s) SubCutaneous every 12 hours  insulin glargine Injectable (LANTUS) 10 Unit(s) SubCutaneous at bedtime  insulin lispro (ADMELOG) corrective regimen sliding scale   SubCutaneous three times a day before meals  levETIRAcetam  Solution 500 milliGRAM(s) Oral two times a day  pantoprazole  Injectable 40 milliGRAM(s) IV Push every 12 hours  silver sulfADIAZINE 1% Cream 1 Application(s) Topical every 12 hours  sodium phosphate 30 milliMole(s)/500 mL IVPB 30 milliMole(s) IV Intermittent once  sodium zirconium cyclosilicate 10 Gram(s) Oral every 8 hours    PRN MEDICATIONS  acetaminophen     Tablet .. 650 milliGRAM(s) Oral every 6 hours PRN  dextrose Oral Gel 15 Gram(s) Oral once PRN  midodrine. 2.5 milliGRAM(s) Oral three times a day PRN  sodium chloride 0.9% Bolus. 100 milliLiter(s) IV Bolus every 5 minutes PRN  sodium chloride 0.9% Bolus. 100 milliLiter(s) IV Bolus every 5 minutes PRN    VITALS:   T(F): 98.1  HR: 76  BP: 133/72  RR: 18  SpO2: 100%    PHYSICAL EXAM:  GENERAL: NAD, well-groomed, well-developed  HEAD:  Atraumatic, Normocephalic  EYES: EOMI  NECK: Supple  NERVOUS SYSTEM:  Alert & Oriented X3, non focal   CHEST/LUNG: Clear to auscultation bilaterally; No rales, rhonchi, wheezing, or rubs  HEART: Regular rate and rhythm; No murmurs, rubs, or gallops  ABDOMEN: Soft, Nontender, Nondistended; Bowel sounds present  EXTREMITIES:  2+ Peripheral Pulses, No clubbing, cyanosis, or edema  LYMPH: No lymphadenopathy noted  SKIN: No rashes or lesions  LABS:                        7.8    10.45 )-----------( 499      ( 29 Jun 2023 13:32 )             23.9     06-29    133<L>  |  93<L>  |  71<HH>  ----------------------------<  97  5.5<H>   |  28  |  2.7<H>    Ca    8.8      29 Jun 2023 13:32  Phos  1.8     06-29  Mg     2.1     06-29    TPro  5.4<L>  /  Alb  2.0<L>  /  TBili  0.3  /  DBili  x   /  AST  20  /  ALT  19  /  AlkPhos  192<H>  06-29      Urinalysis Basic - ( 29 Jun 2023 13:32 )    Color: x / Appearance: x / SG: x / pH: x  Gluc: 97 mg/dL / Ketone: x  / Bili: x / Urobili: x   Blood: x / Protein: x / Nitrite: x   Leuk Esterase: x / RBC: x / WBC x   Sq Epi: x / Non Sq Epi: x / Bacteria: x

## 2023-06-29 NOTE — PROGRESS NOTE ADULT - ASSESSMENT
PMH of ICH (2021) s/p trach and PEG, HTN, HLD, T2DM, CAD s/p stents, Recurrent C diff, BIBEMS from [facility] for abnormal labs.   MICHELLE on CKD 3a - severe azotemia  likely due to GIB / hypotension  Started HD on 6/1/23  Acute anemia d/t UGIB  Troponemia  Lactic acidosis resolved   DKA resolved   Afib   -  last hd monday / check labs today / if creatinine continues to increase and k not improving will do hd today   -  last ph improved / please repeat  - teixeira d/c , check bladder scan   -  continue bumex , document UOP   -h/h noted / blood tx /   -  feed : blake juarez noted / lokelma  8 q 8   - very poor prognosis   will follow    VIEW ALL

## 2023-06-29 NOTE — PROGRESS NOTE ADULT - SUBJECTIVE AND OBJECTIVE BOX
Over Night Events: events noted, vent dependant, sp 1 unit PRBC    PHYSICAL EXAM    ICU Vital Signs Last 24 Hrs  T(C): 36.8 (29 Jun 2023 05:38), Max: 36.8 (29 Jun 2023 05:38)  T(F): 98.3 (29 Jun 2023 05:38), Max: 98.3 (29 Jun 2023 05:38)  HR: 84 (29 Jun 2023 05:38) (80 - 88)  BP: 128/67 (29 Jun 2023 05:38) (114/60 - 128/67)  RR: 18 (29 Jun 2023 05:38) (18 - 18)  SpO2: 100% (29 Jun 2023 04:22) (100% - 100%)    O2 Parameters below as of 28 Jun 2023 15:55      O2 Concentration (%): 40        General: Ill looking  HEENT: trach          Lungs: Bilateral BS  Cardiovascular: Regular   Abdomen: Soft, Positive BS  unresponsive      06-28-23 @ 07:01  -  06-29-23 @ 05:50  --------------------------------------------------------  IN:    Enteral Tube Flush: 60 mL    Peptamen A.F.: 375 mL  Total IN: 435 mL    OUT:  Total OUT: 0 mL    Total NET: 435 mL          LABS:                          6.5    12.06 )-----------( 509      ( 28 Jun 2023 12:00 )             20.8                                               06-28    134<L>  |  95<L>  |  61<HH>  ----------------------------<  127<H>  5.2<H>   |  31  |  2.5<H>    Ca    8.7      28 Jun 2023 12:00  Mg     2.0     06-27    TPro  5.3<L>  /  Alb  1.9<L>  /  TBili  0.3  /  DBili  x   /  AST  26  /  ALT  21  /  AlkPhos  209<H>  06-27                                             Urinalysis Basic - ( 28 Jun 2023 12:00 )    Color: x / Appearance: x / SG: x / pH: x  Gluc: 127 mg/dL / Ketone: x  / Bili: x / Urobili: x   Blood: x / Protein: x / Nitrite: x   Leuk Esterase: x / RBC: x / WBC x   Sq Epi: x / Non Sq Epi: x / Bacteria: x                                                  LIVER FUNCTIONS - ( 27 Jun 2023 06:36 )  Alb: 1.9 g/dL / Pro: 5.3 g/dL / ALK PHOS: 209 U/L / ALT: 21 U/L / AST: 26 U/L / GGT: x                                                                                               Mode: AC/ CMV (Assist Control/ Continuous Mandatory Ventilation)  RR (machine): 14  TV (machine): 450  FiO2: 40  PEEP: 8  ITime: 1  MAP: 12  PIP: 28                                          MEDICATIONS  (STANDING):  acetylcysteine 20%  Inhalation 4 milliLiter(s) Inhalation every 6 hours  albuterol/ipratropium for Nebulization 3 milliLiter(s) Nebulizer every 6 hours  aMIOdarone    Tablet   Oral   aMIOdarone    Tablet 200 milliGRAM(s) Oral daily  aspirin  chewable 81 milliGRAM(s) Oral daily  atorvastatin 80 milliGRAM(s) Oral at bedtime  buMETAnide Injectable 2 milliGRAM(s) IV Push every 12 hours  chlorhexidine 0.12% Liquid 15 milliLiter(s) Oral Mucosa two times a day  chlorhexidine 2% Cloths 1 Application(s) Topical <User Schedule>  collagenase Ointment 1 Application(s) Topical two times a day  dextrose 5%. 1000 milliLiter(s) (100 mL/Hr) IV Continuous <Continuous>  dextrose 5%. 1000 milliLiter(s) (50 mL/Hr) IV Continuous <Continuous>  dextrose 50% Injectable 25 Gram(s) IV Push once  dextrose 50% Injectable 12.5 Gram(s) IV Push once  dextrose 50% Injectable 25 Gram(s) IV Push once  diltiazem    milliGRAM(s) Oral daily  glucagon  Injectable 1 milliGRAM(s) IntraMuscular once  heparin   Injectable 5000 Unit(s) SubCutaneous every 12 hours  insulin glargine Injectable (LANTUS) 10 Unit(s) SubCutaneous at bedtime  insulin lispro (ADMELOG) corrective regimen sliding scale   SubCutaneous three times a day before meals  levETIRAcetam  Solution 500 milliGRAM(s) Oral two times a day  pantoprazole  Injectable 40 milliGRAM(s) IV Push every 12 hours  silver sulfADIAZINE 1% Cream 1 Application(s) Topical every 12 hours  sodium phosphate 30 milliMole(s)/500 mL IVPB 30 milliMole(s) IV Intermittent once  sodium zirconium cyclosilicate 10 Gram(s) Oral every 8 hours    MEDICATIONS  (PRN):  acetaminophen     Tablet .. 650 milliGRAM(s) Oral every 6 hours PRN Temp greater or equal to 38C (100.4F), Mild Pain (1 - 3)  dextrose Oral Gel 15 Gram(s) Oral once PRN Blood Glucose LESS THAN 70 milliGRAM(s)/deciliter  midodrine. 2.5 milliGRAM(s) Oral three times a day PRN SBP < 110  sodium chloride 0.9% Bolus. 100 milliLiter(s) IV Bolus every 5 minutes PRN SBP LESS THAN or EQUAL to 80 mmHg  sodium chloride 0.9% Bolus. 100 milliLiter(s) IV Bolus every 5 minutes PRN SBP LESS THAN or EQUAL to 100 mmHg      Xrays:                                                                                     ECHO

## 2023-06-29 NOTE — PROGRESS NOTE ADULT - ASSESSMENT
64 yo M with  PMH of ICH (2021) s/p trach and PEG, HTN, HLD, T2DM, CAD s/p stents, Recurrent C diff, BIBEMS from [facility] for abnormal labs. Patient non-verbal at baseline - limited history. Per NH chart  have a BUN greater than 200 and creatinine of 4.3 on outpatient labs. Outpatient CXR also w/ new L sided pleural effusion. In ED, patient was hypotensive with Afib with RVR, found to be in acure renal failure, anemic with elevated trops. He was started on Amiodarone GTT, PPI GTT, gastric lavage with coffee ground secretions, started on broad spectrum abx and admitted to MICU  While in MICU, course complicated by DKA, s/p insulin drip, MICHELLE started on HD, acute anemia s/p PRBC transfusion, NSTEMI, downgraded to SDU -> floor    Acute Renal Failure, started on HD 6/1  Fluid Overload  HAGMA  - non oliguric, RBUS without hydro   - started on HD 6/1 via R IJ Tasha   - Nephro on board- last HD 6/20  - continue bumex , document accurate UO   - c/w midodrine 10mg q8hr  - c/w sodium bicarb 1300mg PO TID  -  TTE 6/1 - EF 63%, GIDD   - On HD   - if creatinine continue to increase and remains hyperkalemic / will need tdc and hd today  / if not will d/c udall   -  last ph improved   - teixeira discontinued 6/19 , check bladder scan    -  feed : nepro k noted / lokelma 10 q   12 / can increase to 8 if k >5  cr now 2.7, HD today  - very poor prognosis   will follow        #fever, suspected VAP-improved  #Candiduria with U cx C tropicalis  -  CXR Further significant increase in left-sided opacities/effusion with minimal aeration of the left lung. Right-sided opacities re-demonstrated    6/11 BCX NGTD     6/11 UCX   >100,000 CFU/ml Pseudomonas aeruginosa    6/11 sputum   Three or more mixed gram negative rods including    Moderate Pseudomonas aeruginosa (Carbapenem Resistant)    6/7 BCX NGTD   - ID consult 6/14: Recommended switching gentamicin to ceftolozane-tazobactam 2.25g IV x1, followed by 450mg IV q8h (HD dosing) for the treatment of pneumonia and UTI due to carbapenem-resistant Pseudomonas aeruginosa x 7 days - ended   - fluconazole, end 6/15    Acute anemia  - HgB stable (baseline 8)   - evaluated by GI - no evidence of GI bleed (PEG tube flushed with return of bile and no blood)  - Monitor H/H closely  - Active T+S  - sp multiple units on this hospitalization    Hyponatremia, likely hypervolemic-resolved  - c/w bumex 2mg iv q12hr and HD per nephro  - daily BMP    Afib w/ RVR - now rate controlled   - currently rate controlled s/p amio gtt  - changed amio to 200 q24H   - started [id number] mg daily as he went back into afib rvr   - now rate controlled    Acute Decompensated CHF  CAD s/p stents  NSTEMI  - Trops 1.80-->1.63-->1.57-->1.55  - seen by cardio 5/31,  c/w asa and statin, medical management for NSTEMI, CAD, c/w Diuresis    DKA, resolved  - s/p insulin drip, now on basal/bolus and tube feeds. Monitor FS     Hx ICH s/p trach and PEG  Bedbound from NH   - c/w keppra 500mg BID for seizure prophylaxis    #Unstageable sacral ulcer  - local wound care     DVT Ppx: HSQ  GI Ppx: Pantoprazole 40mg BID  Diet: Peg tube  Activity: bedrest    Overall Prognosis poor.  Pending : GOC ongoing/ HD/   Dispo: TBD      62 yo M with  PMH of ICH (2021) s/p trach and PEG, HTN, HLD, T2DM, CAD s/p stents, Recurrent C diff, BIBEMS from [facility] for abnormal labs. Patient non-verbal at baseline - limited history. Per NH chart  have a BUN greater than 200 and creatinine of 4.3 on outpatient labs. Outpatient CXR also w/ new L sided pleural effusion. In ED, patient was hypotensive with Afib with RVR, found to be in acure renal failure, anemic with elevated trops. He was started on Amiodarone GTT, PPI GTT, gastric lavage with coffee ground secretions, started on broad spectrum abx and admitted to MICU  While in MICU, course complicated by DKA, s/p insulin drip, MICHELLE started on HD, acute anemia s/p PRBC transfusion, NSTEMI, downgraded to SDU -> floor    Acute Renal Failure, started on HD 6/1  Fluid Overload  HAGMA  - non oliguric, RBUS without hydro   - started on HD 6/1 via R IJ Tasha   - Nephro on board- last HD 6/20  - continue bumex , document accurate UO   - c/w midodrine 10mg q8hr  - c/w sodium bicarb 1300mg PO TID  -  TTE 6/1 - EF 63%, GIDD   - On HD   - if creatinine continue to increase and remains hyperkalemic / will need tdc and hd today  / if not will d/c udall   -  last ph improved   - teixeira discontinued 6/19 , bladder scan 6/29 showed 458. Urology called to attempt straight cath before HD    -  feed : nepro k noted / lokelma 10 q   12 / can increase to 8 if k >5  cr now 2.7, HD today  - very poor prognosis   will follow        #fever, suspected VAP-improved  #Candiduria with U cx C tropicalis  -  CXR Further significant increase in left-sided opacities/effusion with minimal aeration of the left lung. Right-sided opacities re-demonstrated    6/11 BCX NGTD     6/11 UCX   >100,000 CFU/ml Pseudomonas aeruginosa    6/11 sputum   Three or more mixed gram negative rods including    Moderate Pseudomonas aeruginosa (Carbapenem Resistant)    6/7 BCX NGTD   - ID consult 6/14: Recommended switching gentamicin to ceftolozane-tazobactam 2.25g IV x1, followed by 450mg IV q8h (HD dosing) for the treatment of pneumonia and UTI due to carbapenem-resistant Pseudomonas aeruginosa x 7 days - ended   - fluconazole, end 6/15    Acute anemia  - HgB stable (baseline 8)   - evaluated by GI - no evidence of GI bleed (PEG tube flushed with return of bile and no blood)  - Monitor H/H closely  - Active T+S  - sp multiple units on this hospitalization    Hyponatremia, likely hypervolemic-resolved  - c/w bumex 2mg iv q12hr and HD per nephro  - daily BMP    Afib w/ RVR - now rate controlled   - currently rate controlled s/p amio gtt  - changed amio to 200 q24H   - started [id number] mg daily as he went back into afib rvr   - now rate controlled    Acute Decompensated CHF  CAD s/p stents  NSTEMI  - Trops 1.80-->1.63-->1.57-->1.55  - seen by cardio 5/31,  c/w asa and statin, medical management for NSTEMI, CAD, c/w Diuresis    DKA, resolved  - s/p insulin drip, now on basal/bolus and tube feeds. Monitor FS     Hx ICH s/p trach and PEG  Bedbound from NH   - c/w keppra 500mg BID for seizure prophylaxis    #Unstageable sacral ulcer  - local wound care     DVT Ppx: HSQ  GI Ppx: Pantoprazole 40mg BID  Diet: Peg tube  Activity: bedrest    Overall Prognosis poor.  Pending : GOC ongoing/ HD/   Dispo: TBD

## 2023-06-29 NOTE — CONSULT NOTE ADULT - ASSESSMENT
Urology called for difficult Barney catheter placement, RN had difficulty on CIC with resistance on straight cath placement. BS ( ? random scan) 458 cc.   came to place Barney catheter, Pt. transferred to HD, spoke with HD RN who states that no other procedures can be done during HD. Pt. will be back to the floor in 2 hrs.   Pt. had Barney catheter, seen  on prior RBUS.   Seen by Urology on 9/10/22  for MICHELLE , Urinary retention and CT A/P finding from (2021) Symmetric renal enhancement without hydronephrosis. Bilateral renal cysts and hypodensities too small to characterize. A 1.7 cm exophytic left upper pole hypoattenuating lesion is indeterminate.  A 2.4 cm nodular soft tissue density along the right posterior bladder wall appears separate from the prostate gland and not seen on pelvic CT from 2017. Enlarged prostate protruding into the base of bladder.  Repeat CT A/P findings 10/9/22 : Under distended urinary bladder limits evaluation. Circumferential bladder wall thickening with irregular posterior bladder wall masslike thickening.  At that time Dr. Pruitt discussed the bladder findings with the patient's wife.  She declined cystoscopy possible TURBT at this time until he is well enough to undergo surgical procedures.      Plan:  Will evaluate Pt. after HD for Barney catheter placement .  Urology called for difficult Barney catheter placement, RN had difficulty on CIC with resistance on straight cath placement. BS ( ? random scan) 458 cc.   came to place Barney catheter, Pt. transferred to HD, spoke with HD RN who states that no other procedures can be done during HD. Pt. will be back to the floor in 2 hrs.   Pt. had Barney catheter, seen  on prior RBUS.   Seen by Urology on 9/10/22  for MICHELLE , Urinary retention and CT A/P finding from (2021) Symmetric renal enhancement without hydronephrosis. Bilateral renal cysts and hypodensities too small to characterize. A 1.7 cm exophytic left upper pole hypoattenuating lesion is indeterminate.  A 2.4 cm nodular soft tissue density along the right posterior bladder wall appears separate from the prostate gland and not seen on pelvic CT from 2017. Enlarged prostate protruding into the base of bladder.  Repeat CT A/P findings 10/9/22 : Under distended urinary bladder limits evaluation. Circumferential bladder wall thickening with irregular posterior bladder wall masslike thickening.  At that time Dr. Pruitt discussed the bladder findings with the patient's wife.  She declined cystoscopy possible TURBT at this time until he is well enough to undergo surgical procedures.      A: Difficult CIC with  cc     Plan:  Will evaluate Pt. after HD for Barney catheter placement .      Addendum:   Pt. seen and examined at bedside  s/p HD at 6:30 PM. Pt. with no palpable urinary bladder on PE. BS at bedside 173- 210 cc.      On PE:  General: mechanically vented via trach, non verbal   Skin: normal color, non diaphoretic   Resp: mechanically ventilated, + trach  Abd: well healed midabdominal scar. Soft, NT/ND, no palpable bladder   : + penoscrotal edema, + phimosis, not retractable foreskin b/l testis descended.   Extremities: + B/L LE edema       Plan:  No acute  intervention at this time   [place] condom catheter for strict I&O  Please obtain official RBUS.  If Barney needed for comfort measures, or urinary retention with  or more consider Barney catheter placement.   F/U nephrology recommendations   Will d/w  attending

## 2023-06-30 NOTE — PROGRESS NOTE ADULT - NUTRITIONAL ASSESSMENT
Diet:  Diet, NPO with Tube Feed:   Tube Feeding Modality: Gastrostomy  Peptamen A.F. Formula  Total Volume for 24 Hours (mL): 1500  Bolus  Total Volume of Bolus (mL):  375  Tube Feed Frequency: Every 6 hours   Tube Feed Start Time: 16:00  Bolus Feed Rate (mL per Hour): 500   Bolus Feed Duration (in Hours): 0.75  Free Water Flush Instructions:  flush GT with 30 ml water syringe push before & after each feed (06-02-23 @ 15:5

## 2023-06-30 NOTE — PROGRESS NOTE ADULT - SUBJECTIVE AND OBJECTIVE BOX
NUTRITION SUPPORT TEAM  -  PROGRESS NOTE     remains vented, + trach   + anasarca somewhat improved  abd soft, + GT  + sacral ulcer  pt is  receiving hydrolyzed enteral Rx)  medical f/u noted   on HD  REVIEW OF SYSTEMS:  Negative except as noted above.     VITALS:  T(F): 97.3 (06-30 @ 05:00), Max: 97.3 (06-30 @ 05:00)  HR: 83 (06-30 @ 08:39) (78 - 83)  BP: 128/60 (06-30 @ 05:00) (128/60 - 128/60)  RR: 16 (06-30 @ 05:00) (16 - 16)  SpO2: 100% (06-30 @ 08:39) (98% - 100%)  Mode: AC/ CMV (Assist Control/ Continuous Mandatory Ventilation)  RR (machine): 14  TV (machine): 450  FiO2: 40  PEEP: 8  ITime: 0.8  MAP: 14  PIP: 26    HEIGHT/WEIGHT/BMI:   Height (cm): 170.2 (06-01), 180.3 (10-04)  Weight (kg): 93.3 (06-05), 66.4 (10-04)  BMI (kg/m2): 32.2 (06-05), 22.9 (06-01), 20.4 (10-04)  06-29-23 @ 07:01  -  06-30-23 @ 07:00  --------------------------------------------------------  IN:  Total IN: 0 mL    OUT:    Other (mL): 2000 mL  Total OUT: 2000 mL    Total NET: -2000 mL    MEDICATIONS:   acetaminophen     Tablet .. 650 milliGRAM(s) Oral every 6 hours PRN  acetylcysteine 20%  Inhalation 4 milliLiter(s) Inhalation every 6 hours  albuterol/ipratropium for Nebulization 3 milliLiter(s) Nebulizer every 6 hours  aMIOdarone    Tablet 200 milliGRAM(s) Oral daily  aMIOdarone    Tablet   Oral   aspirin  chewable 81 milliGRAM(s) Oral daily  atorvastatin 80 milliGRAM(s) Oral at bedtime  buMETAnide Injectable 2 milliGRAM(s) IV Push every 12 hours  chlorhexidine 0.12% Liquid 15 milliLiter(s) Oral Mucosa two times a day  chlorhexidine 2% Cloths 1 Application(s) Topical <User Schedule>  collagenase Ointment 1 Application(s) Topical two times a day  diltiazem    milliGRAM(s) Oral daily  glucagon  Injectable 1 milliGRAM(s) IntraMuscular once  heparin   Injectable 5000 Unit(s) SubCutaneous every 12 hours  insulin glargine Injectable (LANTUS) 10 Unit(s) SubCutaneous at bedtime  insulin lispro (ADMELOG) corrective regimen sliding scale   SubCutaneous three times a day before meals  levETIRAcetam  Solution 500 milliGRAM(s) Oral two times a day  pantoprazole  Injectable 40 milliGRAM(s) IV Push every 12 hours  silver sulfADIAZINE 1% Cream 1 Application(s) Topical every 12 hours  sodium chloride 0.9% Bolus. 100 milliLiter(s) IV Bolus every 5 minutes PRN  sodium chloride 0.9% Bolus. 100 milliLiter(s) IV Bolus every 5 minutes PRN  sodium phosphate 30 milliMole(s)/500 mL IVPB 30 milliMole(s) IV Intermittent once  sodium zirconium cyclosilicate 10 Gram(s) Oral every 8 hours    LABS:                         7.8    10.45 )-----------( 499      ( 29 Jun 2023 13:32 )             23.9     133<L>  |  93<L>  |  71<HH>  ----------------------------<  97          (06-29-23 @ 13:32)  5.5<H>   |  28  |  2.7<H>    Ca    8.8          (06-29-23 @ 13:32)  Phos  1.8         (06-29-23 @ 13:32)  Mg     2.1         (06-29-23 @ 13:32)    TPro  5.4<L>  /  Alb  2.0<L>  /  TBili  0.3  /  DBili  x   /  AST  20  /  ALT  19  /  AlkPhos  192<H>       06-29-23 @ 13:32  Vitamin D, 25-Hydroxy: 70 ng/mL (06-03 @ 06:50)  Folate: >20.0 ng/mL (06-03 @ 06:50)  Vitamin B12, Serum: 1908 pg/mL (06-06 @ 10:40)  Zinc Level, Plasma: 54 ug/dL (06-03 @ 06:50)  Blood Glucose (Past 24 hours):  103 mg/dL (06-30 @ 06:10)  147 mg/dL (06-29 @ 21:42)  110 mg/dL (06-29 @ 18:22)  112 mg/dL (06-29 @ 12:22)    DIET:   Diet, NPO with Tube Feed:   Tube Feeding Modality: Gastrostomy  Peptamen A.F. Formula  Total Volume for 24 Hours (mL): 1500  Bolus  Total Volume of Bolus (mL):  375  Tube Feed Frequency: Every 6 hours   Tube Feed Start Time: 16:00  Bolus Feed Rate (mL per Hour): 500   Bolus Feed Duration (in Hours): 0.75  Free Water Flush Instructions:  flush GT with 30 ml water syringe push before & after each feed (06-02-23 @ 15:58) [Active]       NUTRITION SUPPORT TEAM  -  PROGRESS NOTE     remains vented, + trach   + anasarca somewhat improved  abd soft, + GT  + sacral ulcer  pt is  receiving hydrolyzed enteral Rx)  medical f/u noted - diarrhea reported, + rectal tube placed  on HD - unsure of progression     REVIEW OF SYSTEMS:  Negative except as noted above.     VITALS:  T(F): 97.3 (06-30 @ 05:00), Max: 97.3 (06-30 @ 05:00)  HR: 83 (06-30 @ 08:39) (78 - 83)  BP: 128/60 (06-30 @ 05:00) (128/60 - 128/60)  RR: 16 (06-30 @ 05:00) (16 - 16)  SpO2: 100% (06-30 @ 08:39) (98% - 100%)  Mode: AC/ CMV (Assist Control/ Continuous Mandatory Ventilation)  RR (machine): 14  TV (machine): 450  FiO2: 40  PEEP: 8  ITime: 0.8  MAP: 14  PIP: 26    HEIGHT/WEIGHT/BMI:   Height (cm): 170.2 (06-01), 180.3 (10-04)  Weight (kg): 93.3 (06-05), 66.4 (10-04)  BMI (kg/m2): 32.2 (06-05), 22.9 (06-01), 20.4 (10-04)    06-29-23 @ 07:01  -  06-30-23 @ 07:00  --------------------------------------------------------  IN:  Total IN: 0 mL-------  no feeds or meds entered in flowsheets    OUT:    Other (mL): 2000 mL  Total OUT: 2000 mL    Total NET: -2000 mL    MEDICATIONS:   acetaminophen     Tablet .. 650 milliGRAM(s) Oral every 6 hours PRN  acetylcysteine 20%  Inhalation 4 milliLiter(s) Inhalation every 6 hours  albuterol/ipratropium for Nebulization 3 milliLiter(s) Nebulizer every 6 hours  aMIOdarone    Tablet 200 milliGRAM(s) Oral daily  aMIOdarone    Tablet   Oral   aspirin  chewable 81 milliGRAM(s) Oral daily  atorvastatin 80 milliGRAM(s) Oral at bedtime  buMETAnide Injectable 2 milliGRAM(s) IV Push every 12 hours  chlorhexidine 0.12% Liquid 15 milliLiter(s) Oral Mucosa two times a day  chlorhexidine 2% Cloths 1 Application(s) Topical <User Schedule>  collagenase Ointment 1 Application(s) Topical two times a day  diltiazem    milliGRAM(s) Oral daily  heparin   Injectable 5000 Unit(s) SubCutaneous every 12 hours  insulin glargine Injectable (LANTUS) 10 Unit(s) SubCutaneous at bedtime  insulin lispro (ADMELOG) corrective regimen sliding scale   SubCutaneous three times a day before meals  levETIRAcetam  Solution 500 milliGRAM(s) Oral two times a day  pantoprazole  Injectable 40 milliGRAM(s) IV Push every 12 hours  silver sulfADIAZINE 1% Cream 1 Application(s) Topical every 12 hours  sodium chloride 0.9% Bolus. 100 milliLiter(s) IV Bolus every 5 minutes PRN  sodium chloride 0.9% Bolus. 100 milliLiter(s) IV Bolus every 5 minutes PRN  sodium phosphate 30 milliMole(s)/500 mL IVPB 30 milliMole(s) IV Intermittent once  sodium zirconium cyclosilicate 10 Gram(s) Oral every 8 hours    LABS:                         7.8    10.45 )-----------( 499      ( 29 Jun 2023 13:32 )             23.9     133<L>  |  93<L>  |  71<HH>  ----------------------------<  97          (06-29-23 @ 13:32)  5.5<H>   |  28  |  2.7<H>    Ca    8.8          (06-29-23 @ 13:32)  Phos  1.8         (06-29-23 @ 13:32)  Mg     2.1         (06-29-23 @ 13:32)    TPro  5.4<L>  /  Alb  2.0<L>  /  TBili  0.3  /  DBili  x   /  AST  20  /  ALT  19  /  AlkPhos  192<H>       06-29-23 @ 13:32  Vitamin D, 25-Hydroxy: 70 ng/mL (06-03 @ 06:50)  Folate: >20.0 ng/mL (06-03 @ 06:50)  Vitamin B12, Serum: 1908 pg/mL (06-06 @ 10:40)  Zinc Level, Plasma: 54 ug/dL (06-03 @ 06:50)  Blood Glucose (Past 24 hours):  103 mg/dL (06-30 @ 06:10)  147 mg/dL (06-29 @ 21:42)  110 mg/dL (06-29 @ 18:22)  112 mg/dL (06-29 @ 12:22)    DIET:   Diet, NPO with Tube Feed:   Tube Feeding Modality: Gastrostomy  Peptamen A.F. Formula  Total Volume for 24 Hours (mL): 1500  Bolus  Total Volume of Bolus (mL):  375  Tube Feed Frequency: Every 6 hours   Tube Feed Start Time: 16:00  Bolus Feed Rate (mL per Hour): 500   Bolus Feed Duration (in Hours): 0.75  Free Water Flush Instructions:  flush GT with 30 ml water syringe push before & after each feed (06-02-23 @ 15:58) [Active]

## 2023-06-30 NOTE — PROGRESS NOTE ADULT - ASSESSMENT
64 YO F  PMH of ICH (2021) s/p trach and PEG, HTN, HLD, T2DM, CAD s/p stents, Recurrent C diff, BIBEMS from [facility] for abnormal labs.   MICHELLE on CKD 3a - severe azotemia  likely due to GIB / hypotension  Started HD on 6/1/23  Acute anemia d/t UGIB  Troponemia  Lactic acidosis resolved   DKA resolved   Afib   -  last hd Thursday  - phos 1.8 not on binders   -  continue bumex , document UOP   -h/h noted / blood tx /   -  feed : blake juarez noted / lokelma  8 q 8   - very poor prognosis   will follow

## 2023-06-30 NOTE — PROGRESS NOTE ADULT - SUBJECTIVE AND OBJECTIVE BOX
Nephrology progress note    Patient was seen and examined, events over the last 24 h noted .  HD yesterday    Allergies:  penicillin (Other)    Hospital Medications:   MEDICATIONS  (STANDING):  acetylcysteine 20%  Inhalation 4 milliLiter(s) Inhalation every 6 hours  albuterol/ipratropium for Nebulization 3 milliLiter(s) Nebulizer every 6 hours  aMIOdarone    Tablet 200 milliGRAM(s) Oral daily  aMIOdarone    Tablet   Oral   aspirin  chewable 81 milliGRAM(s) Oral daily  atorvastatin 80 milliGRAM(s) Oral at bedtime  buMETAnide Injectable 2 milliGRAM(s) IV Push every 12 hours  chlorhexidine 0.12% Liquid 15 milliLiter(s) Oral Mucosa two times a day  chlorhexidine 2% Cloths 1 Application(s) Topical <User Schedule>  collagenase Ointment 1 Application(s) Topical two times a day  dextrose 5%. 1000 milliLiter(s) (100 mL/Hr) IV Continuous <Continuous>  dextrose 5%. 1000 milliLiter(s) (50 mL/Hr) IV Continuous <Continuous>  dextrose 50% Injectable 25 Gram(s) IV Push once  dextrose 50% Injectable 12.5 Gram(s) IV Push once  dextrose 50% Injectable 25 Gram(s) IV Push once  diltiazem    milliGRAM(s) Oral daily  glucagon  Injectable 1 milliGRAM(s) IntraMuscular once  heparin   Injectable 5000 Unit(s) SubCutaneous every 12 hours  insulin glargine Injectable (LANTUS) 10 Unit(s) SubCutaneous at bedtime  insulin lispro (ADMELOG) corrective regimen sliding scale   SubCutaneous three times a day before meals  levETIRAcetam  Solution 500 milliGRAM(s) Oral two times a day  pantoprazole  Injectable 40 milliGRAM(s) IV Push every 12 hours  silver sulfADIAZINE 1% Cream 1 Application(s) Topical every 12 hours  sodium phosphate 30 milliMole(s)/500 mL IVPB 30 milliMole(s) IV Intermittent once  sodium zirconium cyclosilicate 10 Gram(s) Oral every 8 hours        VITALS:  T(F): 97.3 (06-30-23 @ 05:00), Max: 98.1 (06-29-23 @ 12:40)  HR: 83 (06-30-23 @ 08:39)  BP: 128/60 (06-30-23 @ 05:00)  RR: 16 (06-30-23 @ 05:00)  SpO2: 100% (06-30-23 @ 08:39)  Wt(kg): --    06-28 @ 07:01  -  06-29 @ 07:00  --------------------------------------------------------  IN: 870 mL / OUT: 0 mL / NET: 870 mL    06-29 @ 07:01  -  06-30 @ 07:00  --------------------------------------------------------  IN: 0 mL / OUT: 2000 mL / NET: -2000 mL          PHYSICAL EXAM:  	Gen: trach/vent   	Pulm: B/L aline   	CV:  S1S2; no rub  	Abd: +distended  	Vascular access:    LABS:  06-29    133<L>  |  93<L>  |  71<HH>  ----------------------------<  97  5.5<H>   |  28  |  2.7<H>    Ca    8.8      29 Jun 2023 13:32  Phos  1.8     06-29  Mg     2.1     06-29    TPro  5.4<L>  /  Alb  2.0<L>  /  TBili  0.3  /  DBili      /  AST  20  /  ALT  19  /  AlkPhos  192<H>  06-29                          7.8    10.45 )-----------( 499      ( 29 Jun 2023 13:32 )             23.9       Urine Studies:  Urinalysis Basic - ( 29 Jun 2023 13:32 )    Color:  / Appearance:  / SG:  / pH:   Gluc: 97 mg/dL / Ketone:   / Bili:  / Urobili:    Blood:  / Protein:  / Nitrite:    Leuk Esterase:  / RBC:  / WBC    Sq Epi:  / Non Sq Epi:  / Bacteria:         RADIOLOGY & ADDITIONAL STUDIES:

## 2023-06-30 NOTE — PROGRESS NOTE ADULT - SUBJECTIVE AND OBJECTIVE BOX
SUBJECTIVE:    Patient is a 63y old Male who presents with a chief complaint of Abnormal labs (30 Jun 2023 05:52)    Currently admitted to medicine with the primary diagnosis of MICHELLE (acute kidney injury)       Today is hospital day 31d.     HPI:  62 yo M with  PMH of ICH (2021) s/p trach and PEG, HTN, HLD, T2DM, CAD s/p stents, Recurrent C diff, BIBEMS from [facility] for abnormal labs. Patient non-verbal at baseline - limited history. Per NH chart  have a BUN greater than 200 and creatinine of 4.3 on outpatient labs. Outpatient CXR also w/ new L sided pleural effusion. With EMS patient was also found to be in irregular rhythm, no known history of A-fib or a flutter.  In ED patient had borderline BP, and was in Atrial fibrillation. He was started on amiodarone gtt. Labs showed , Cr. 4.1, Hb 6.6. Trops 1.8. He was given 1U PRBC, gastric lavage revealed coffee ground emesis w/no active bleeding. He was started on PPI drip and GI consulted in ED. He was given IV aztreonam and vancomycin.   Patient being admitted to ICU for management of Sepsis likely from UTI, MICHELLE likely prerenal and NSTEMI.        (30 May 2023 23:39)      PAST MEDICAL & SURGICAL HISTORY  HTN (hypertension)    Diabetes mellitus    H/O intracranial hemorrhage    H/O tracheostomy    PEG (percutaneous endoscopic gastrostomy) status    Hyperlipidemia        ALLERGIES:  penicillin (Other)    MEDICATIONS:  ACTIVE MEDICATIONS  acetaminophen     Tablet .. 650 milliGRAM(s) Oral every 6 hours PRN  acetylcysteine 20%  Inhalation 4 milliLiter(s) Inhalation every 6 hours  albuterol/ipratropium for Nebulization 3 milliLiter(s) Nebulizer every 6 hours  aMIOdarone    Tablet 200 milliGRAM(s) Oral daily  aMIOdarone    Tablet   Oral   aspirin  chewable 81 milliGRAM(s) Oral daily  atorvastatin 80 milliGRAM(s) Oral at bedtime  buMETAnide Injectable 2 milliGRAM(s) IV Push every 12 hours  chlorhexidine 0.12% Liquid 15 milliLiter(s) Oral Mucosa two times a day  chlorhexidine 2% Cloths 1 Application(s) Topical <User Schedule>  collagenase Ointment 1 Application(s) Topical two times a day  dextrose 5%. 1000 milliLiter(s) IV Continuous <Continuous>  dextrose 5%. 1000 milliLiter(s) IV Continuous <Continuous>  dextrose 50% Injectable 25 Gram(s) IV Push once  dextrose 50% Injectable 12.5 Gram(s) IV Push once  dextrose 50% Injectable 25 Gram(s) IV Push once  dextrose Oral Gel 15 Gram(s) Oral once PRN  diltiazem    milliGRAM(s) Oral daily  glucagon  Injectable 1 milliGRAM(s) IntraMuscular once  heparin   Injectable 5000 Unit(s) SubCutaneous every 12 hours  insulin glargine Injectable (LANTUS) 10 Unit(s) SubCutaneous at bedtime  insulin lispro (ADMELOG) corrective regimen sliding scale   SubCutaneous three times a day before meals  levETIRAcetam  Solution 500 milliGRAM(s) Oral two times a day  midodrine. 2.5 milliGRAM(s) Oral three times a day PRN  pantoprazole  Injectable 40 milliGRAM(s) IV Push every 12 hours  silver sulfADIAZINE 1% Cream 1 Application(s) Topical every 12 hours  sodium chloride 0.9% Bolus. 100 milliLiter(s) IV Bolus every 5 minutes PRN  sodium chloride 0.9% Bolus. 100 milliLiter(s) IV Bolus every 5 minutes PRN  sodium phosphate 30 milliMole(s)/500 mL IVPB 30 milliMole(s) IV Intermittent once  sodium zirconium cyclosilicate 10 Gram(s) Oral every 8 hours      VITALS:   T(F): 97.3  HR: 79  BP: 128/60  RR: 16  SpO2: 98%    LABS:                        7.8    10.45 )-----------( 499      ( 29 Jun 2023 13:32 )             23.9     06-29    133<L>  |  93<L>  |  71<HH>  ----------------------------<  97  5.5<H>   |  28  |  2.7<H>    Ca    8.8      29 Jun 2023 13:32  Phos  1.8     06-29  Mg     2.1     06-29    TPro  5.4<L>  /  Alb  2.0<L>  /  TBili  0.3  /  DBili  x   /  AST  20  /  ALT  19  /  AlkPhos  192<H>  06-29      Urinalysis Basic - ( 29 Jun 2023 13:32 )    Color: x / Appearance: x / SG: x / pH: x  Gluc: 97 mg/dL / Ketone: x  / Bili: x / Urobili: x   Blood: x / Protein: x / Nitrite: x   Leuk Esterase: x / RBC: x / WBC x   Sq Epi: x / Non Sq Epi: x / Bacteria: x    PHYSICAL EXAM:  GEN: Intubated, minimally responsive if at all  LUNGS: Clear to auscultation bilaterally   HEART: S1/S2 present.    ABD: Soft, non-tender, non-distended.   EXT: 1+ B/L pitting edema  NEURO: AAOX0 SUBJECTIVE:    Patient is a 63y old Male who presents with a chief complaint of Abnormal labs (30 Jun 2023 05:52)    Currently admitted to medicine with the primary diagnosis of MICHELLE (acute kidney injury)       Today is hospital day 31d.     HPI:  62 yo M with  PMH of ICH (2021) s/p trach and PEG, HTN, HLD, T2DM, CAD s/p stents, Recurrent C diff, BIBEMS from [facility] for abnormal labs. Patient non-verbal at baseline - limited history. Per NH chart  have a BUN greater than 200 and creatinine of 4.3 on outpatient labs. Outpatient CXR also w/ new L sided pleural effusion. With EMS patient was also found to be in irregular rhythm, no known history of A-fib or a flutter.  In ED patient had borderline BP, and was in Atrial fibrillation. He was started on amiodarone gtt. Labs showed , Cr. 4.1, Hb 6.6. Trops 1.8. He was given 1U PRBC, gastric lavage revealed coffee ground emesis w/no active bleeding. He was started on PPI drip and GI consulted in ED. He was given IV aztreonam and vancomycin.   Patient being admitted to ICU for management of Sepsis likely from UTI, MICHELLE likely prerenal and NSTEMI.        (30 May 2023 23:39)      PAST MEDICAL & SURGICAL HISTORY  HTN (hypertension)    Diabetes mellitus    H/O intracranial hemorrhage    H/O tracheostomy    PEG (percutaneous endoscopic gastrostomy) status    Hyperlipidemia        ALLERGIES:  penicillin (Other)    MEDICATIONS:  ACTIVE MEDICATIONS  acetaminophen     Tablet .. 650 milliGRAM(s) Oral every 6 hours PRN  acetylcysteine 20%  Inhalation 4 milliLiter(s) Inhalation every 6 hours  albuterol/ipratropium for Nebulization 3 milliLiter(s) Nebulizer every 6 hours  aMIOdarone    Tablet 200 milliGRAM(s) Oral daily  aMIOdarone    Tablet   Oral   aspirin  chewable 81 milliGRAM(s) Oral daily  atorvastatin 80 milliGRAM(s) Oral at bedtime  buMETAnide Injectable 2 milliGRAM(s) IV Push every 12 hours  chlorhexidine 0.12% Liquid 15 milliLiter(s) Oral Mucosa two times a day  chlorhexidine 2% Cloths 1 Application(s) Topical <User Schedule>  collagenase Ointment 1 Application(s) Topical two times a day  dextrose 5%. 1000 milliLiter(s) IV Continuous <Continuous>  dextrose 5%. 1000 milliLiter(s) IV Continuous <Continuous>  dextrose 50% Injectable 25 Gram(s) IV Push once  dextrose 50% Injectable 12.5 Gram(s) IV Push once  dextrose 50% Injectable 25 Gram(s) IV Push once  dextrose Oral Gel 15 Gram(s) Oral once PRN  diltiazem    milliGRAM(s) Oral daily  glucagon  Injectable 1 milliGRAM(s) IntraMuscular once  heparin   Injectable 5000 Unit(s) SubCutaneous every 12 hours  insulin glargine Injectable (LANTUS) 10 Unit(s) SubCutaneous at bedtime  insulin lispro (ADMELOG) corrective regimen sliding scale   SubCutaneous three times a day before meals  levETIRAcetam  Solution 500 milliGRAM(s) Oral two times a day  midodrine. 2.5 milliGRAM(s) Oral three times a day PRN  pantoprazole  Injectable 40 milliGRAM(s) IV Push every 12 hours  silver sulfADIAZINE 1% Cream 1 Application(s) Topical every 12 hours  sodium chloride 0.9% Bolus. 100 milliLiter(s) IV Bolus every 5 minutes PRN  sodium chloride 0.9% Bolus. 100 milliLiter(s) IV Bolus every 5 minutes PRN  sodium phosphate 30 milliMole(s)/500 mL IVPB 30 milliMole(s) IV Intermittent once  sodium zirconium cyclosilicate 10 Gram(s) Oral every 8 hours      VITALS:   T(F): 97.3  HR: 79  BP: 128/60  RR: 16  SpO2: 98%    LABS:                        7.8    10.45 )-----------( 499      ( 29 Jun 2023 13:32 )             23.9     06-29    133<L>  |  93<L>  |  71<HH>  ----------------------------<  97  5.5<H>   |  28  |  2.7<H>    Ca    8.8      29 Jun 2023 13:32  Phos  1.8     06-29  Mg     2.1     06-29    TPro  5.4<L>  /  Alb  2.0<L>  /  TBili  0.3  /  DBili  x   /  AST  20  /  ALT  19  /  AlkPhos  192<H>  06-29      Urinalysis Basic - ( 29 Jun 2023 13:32 )    Color: x / Appearance: x / SG: x / pH: x  Gluc: 97 mg/dL / Ketone: x  / Bili: x / Urobili: x   Blood: x / Protein: x / Nitrite: x   Leuk Esterase: x / RBC: x / WBC x   Sq Epi: x / Non Sq Epi: x / Bacteria: x    PHYSICAL EXAM:  GEN: Intubated, minimally responsive if at all  neck trach in place   LUNGS: Clear to auscultation bilaterally   HEART: S1/S2 present.    ABD: Soft, non-tender, non-distended. peg in place   EXT: 1+ B/L pitting edema   NEURO: AAOX0

## 2023-06-30 NOTE — PROGRESS NOTE ADULT - SUBJECTIVE AND OBJECTIVE BOX
--------------------------------------------------------------------------------    24 hour events/subjective:   Reconsult for f/u  pressure injury assessment and management   Currently admitted to medicine with the primary diagnosis of MICHELLE (acute kidney injury)  Trache  to vent     No acute events note over  night       ROS:   pt unable to offer    ALLERGIES & MEDICATIONS  --------------------------------------------------------------------------------  Allergies    penicillin (Other)    Intolerances          STANDING INPATIENT MEDICATIONS    acetylcysteine 20%  Inhalation 4 milliLiter(s) Inhalation every 6 hours  albuterol/ipratropium for Nebulization 3 milliLiter(s) Nebulizer every 6 hours  aMIOdarone    Tablet 200 milliGRAM(s) Oral daily  aMIOdarone    Tablet   Oral   aspirin  chewable 81 milliGRAM(s) Oral daily  atorvastatin 80 milliGRAM(s) Oral at bedtime  buMETAnide Injectable 2 milliGRAM(s) IV Push every 12 hours  chlorhexidine 0.12% Liquid 15 milliLiter(s) Oral Mucosa two times a day  chlorhexidine 2% Cloths 1 Application(s) Topical <User Schedule>  collagenase Ointment 1 Application(s) Topical two times a day  dextrose 5%. 1000 milliLiter(s) IV Continuous <Continuous>  dextrose 5%. 1000 milliLiter(s) IV Continuous <Continuous>  dextrose 50% Injectable 25 Gram(s) IV Push once  dextrose 50% Injectable 12.5 Gram(s) IV Push once  dextrose 50% Injectable 25 Gram(s) IV Push once  diltiazem    milliGRAM(s) Oral daily  glucagon  Injectable 1 milliGRAM(s) IntraMuscular once  heparin   Injectable 5000 Unit(s) SubCutaneous every 12 hours  insulin glargine Injectable (LANTUS) 10 Unit(s) SubCutaneous at bedtime  insulin lispro (ADMELOG) corrective regimen sliding scale   SubCutaneous three times a day before meals  levETIRAcetam  Solution 500 milliGRAM(s) Oral two times a day  pantoprazole  Injectable 40 milliGRAM(s) IV Push every 12 hours  silver sulfADIAZINE 1% Cream 1 Application(s) Topical every 12 hours  sodium phosphate 30 milliMole(s)/500 mL IVPB 30 milliMole(s) IV Intermittent once  sodium zirconium cyclosilicate 10 Gram(s) Oral every 8 hours      PRN INPATIENT MEDICATION  acetaminophen     Tablet .. 650 milliGRAM(s) Oral every 6 hours PRN  dextrose Oral Gel 15 Gram(s) Oral once PRN  sodium chloride 0.9% Bolus. 100 milliLiter(s) IV Bolus every 5 minutes PRN  sodium chloride 0.9% Bolus. 100 milliLiter(s) IV Bolus every 5 minutes PRN        VITALS/PHYSICAL EXAM-  --------------------------------------------------------------------------------  T(C): 36.3 (06-30-23 @ 05:00), Max: 36.7 (06-29-23 @ 12:40)  HR: 83 (06-30-23 @ 08:39) (76 - 83)  BP: 128/60 (06-30-23 @ 05:00) (103/67 - 133/72)  RR: 16 (06-30-23 @ 05:00) (16 - 18)  SpO2: 100% (06-30-23 @ 08:39) (98% - 100%)  Wt(kg): --      06-29-23 @ 07:01  -  06-30-23 @ 07:00  --------------------------------------------------------  IN: 0 mL / OUT: 2000 mL / NET: -2000 mL    GEN: Intubated, minimally responsive if at all  LUNGS: Clear to auscultation bilaterally   HEART: S1/S2 present.    ABD: Soft, non-tender, non-distended. , incontinence  with  diarrhea   EXT: 1+ B/L pitting edema  NEURO: AAOX0  Skin : Multiple  Pressure Injury           LABS/ CULTURES/ RADIOLOGY:              7.8    10.45 >-----------<  499      [06-29-23 @ 13:32]              23.9     133  |  93  |  71  ----------------------------<  97      [06-29-23 @ 13:32]  5.5   |  28  |  2.7        Ca     8.8     [06-29-23 @ 13:32]      Mg     2.1     [06-29-23 @ 13:32]      Phos  1.8     [06-29-23 @ 13:32]    TPro  5.4  /  Alb  2.0  /  TBili  0.3  /  DBili  x   /  AST  20  /  ALT  19  /  AlkPhos  192  [06-29-23 @ 13:32]              CAPILLARY BLOOD GLUCOSE      POCT Blood Glucose.: 103 mg/dL (30 Jun 2023 06:10)  POCT Blood Glucose.: 147 mg/dL (29 Jun 2023 21:42)  POCT Blood Glucose.: 110 mg/dL (29 Jun 2023 18:22)  POCT Blood Glucose.: 112 mg/dL (29 Jun 2023 12:22)                      A1C with Estimated Average Glucose Result: 6.2 % (05-31-23 @ 05:50)

## 2023-06-30 NOTE — PROGRESS NOTE ADULT - ATTENDING COMMENTS
62 yo M with  PMH of ICH (2021) s/p trach and PEG, HTN, HLD, T2DM, CAD s/p stents, Recurrent C diff, BIBEMS from [facility] for abnormal labs. Patient non-verbal at baseline - limited history. Per NH chart  have a BUN greater than 200 and creatinine of 4.3 on outpatient labs. Outpatient CXR also w/ new L sided pleural effusion. In ED, patient was hypotensive with Afib with RVR, found to be in acure renal failure, anemic with elevated trops. He was started on Amiodarone GTT, PPI GTT, gastric lavage with coffee ground secretions, started on broad spectrum abx and admitted to MICU  While in MICU, course complicated by DKA, s/p insulin drip, MICHELLE started on HD, acute anemia s/p PRBC transfusion, NSTEMI, downgraded to SDU -> floor  # Acute Renal Failure, started on HD 6/1, dw nephro , ir consult for tesio  # diarrhea , neg cdiff and gi pcr, immodium one dose , dw nutrition, pt is not on any stool softners since 6/16  # chronic respiratory failure, vent dependant via trach , trach care, fu pulm   # functional dysphagia, sp peg, peg care   # fever, suspected VAP-improved  #Candiduria with U cx C tropicalis  # Acute anemia  # Afib w/ RVR - now rate controlled # Acute Decompensated CHF  CAD s/p stents  NSTEMI, medical management   # DKA, resolved  # Hx ICH   Bedbound from NH   - c/w keppra 500mg BID for seizure prophylaxis  # multiple sacral wounds. sacral and buttock , un-satgeable   wound care nurse to reevulate     # functional paraplegia , hx of ICH, has upper ext contracture total care     #Progress Note Handoff  Pending :  monitor labs, wound care team consult   Family discussion: resident updated family   Disposition: SNF when stable   pt is very high risk for complications of vent and hospitalization   time spent 50 min.

## 2023-06-30 NOTE — PROGRESS NOTE ADULT - ASSESSMENT
64 yo M with  PMH of ICH (2021) s/p trach and PEG, HTN, HLD, T2DM, CAD s/p stents, Recurrent C diff, BIBEMS from [facility] for abnormal labs. Patient non-verbal at baseline - limited history. Per NH chart  have a BUN greater than 200 and creatinine of 4.3 on outpatient labs. Outpatient CXR also w/ new L sided pleural effusion. In ED, patient was hypotensive with Afib with RVR, found to be in acure renal failure, anemic with elevated trops. He was started on Amiodarone GTT, PPI GTT, gastric lavage with coffee ground secretions, started on broad spectrum abx and admitted to MICU  While in MICU, course complicated by DKA, s/p insulin drip, MICHELLE started on HD, acute anemia s/p PRBC transfusion, NSTEMI, downgraded to SDU -> floor    # Acute Renal Failure, started on HD 6/1  Fluid Overload  HAGMA  - non oliguric, RBUS without hydro   - started on HD 6/1 via R IJ [place]   - Cr much improved s/p HD yesterday, now 1.9  - Nephro on board- last HD 6/20  - continue bumex , document accurate UO   - c/w midodrine 10mg q8hr  - c/w sodium bicarb 1300mg PO TID  -  TTE 6/1 - EF 63%, GIDD   - On HD , monitor cr closely and fu nephro     creatinine trend  2.5 (06-28-23 @ 12:00)  2.0 (06-27-23 @ 06:36)  2.6 (06-26-23 @ 08:07)  2.4 (06-25-23 @ 08:10)    # diarrhea   cont [facility]   GI PCR Panel, Stool (12.23.21 @ 15:00)    Specimen Source: .Stool Feces   Culture Results:   GI PCR Results: NOT detected  *******Please Note:*******  GI panel PCR evaluates for:  Campylobacter, Plesiomonas shigelloides, Salmonella,  Vibrio, Yersinia enterocolitica, Enteroaggregative  Escherichia coli (EAEC), Enteropathogenic E.coli (EPEC),  Enterotoxigenic E. coli (ETEC) lt/st, Shiga-like  toxin-producing E. coli (STEC) stx1/stx2,  Shigella/ Enteroinvasive E. coli (EIEC), Cryptosporidium,  Cyclospora cayetanensis, Entamoeba histolytica,  Giardia lamblia, Adenovirus F 40/41, Astrovirus,  Norovirus GI/GII, Rotavirus A, Sapovirus    C Diff by PCR Result: [facility]: Method: CDPCR  TOXIGENIC CLOST.DIFFICILE NEGATIVE;  027-NAP1-B1 PRESUMPTIVE NEGATIVE.   (06.22.23 @ 12:20)        # chronic respiratory failure, vent dependant via trach , trach care, fu pulm   # functional dysphagia, sp peg, peg care   # fever, suspected VAP-improved  #Candiduria with U cx C tropicalis  -  CXR Further significant increase in left-sided opacities/effusion with minimal aeration of the left lung. Right-sided opacities redemonstrated    6/11 BCX NGTD     6/11 UCX   >100,000 CFU/ml Pseudomonas aeruginosa    6/11 sputum   Three or more mixed gram negative rods including    Moderate Pseudomonas aeruginosa (Carbapenem Resistant)    6/7 BCX NGTD   - ID consult 6/14: Recommended switching gentamicin to ceftolozane-tazobactam 2.25g IV x1, followed by 450mg IV q8h (HD dosing) for the treatment of pneumonia and UTI due to carbapenem-resistant Pseudomonas aeruginosa x 7 days - ended   - fluconazole, end 6/15    # Acute anemia  - HgB stable (baseline 8)   - evaluated by GI - no evidence of GI bleed (PEG tube flushed with return of bile and no blood)  - Monitor H/H closely  - transfuse 1 unit   - sp multiple units on this hospitalization, last transfusion 6/28    Hemoglobin: 7.8 g/dL (06-29-23 @ 13:32)  Hemoglobin: 6.5 g/dL (06-28-23 @ 12:00)  Hemoglobin: 7.6 g/dL (06-27-23 @ 06:36)  Hemoglobin: 7.1 g/dL (06-26-23 @ 08:07)  Hemoglobin: 7.2 g/dL (06-25-23 @ 19:30)    # Hyponatremia, likely hypervolemic-resolved- - c/w bumex 2mg iv q12hr and HD per nephro  # Afib w/ RVR - now rate controlled- c/w Amio and Cardizem  # Acute Decompensated CHF- resolved- c/w Diuresis  # CAD s/p stents  # NSTEMI type 2- Stable- seen by cardio 5/31,  c/w asa and statin, medical management for NSTEMI  # DKA, resolved- s/p insulin drip, now on basal/bolus and tube feeds. Monitor FS   # Hx ICH- Bedbound from NH- c/w keppra 500mg BID for seizure prophylaxis  # multiple sacral wounds. sacral and buttock , un-stageable- wound care nurse to reevaluate   # functional paraplegia , hx of ICH, has upper ext contracture total care       #Misc  -DVT prophylaxis: Heparin SQ  -GI prophylaxis: Protonix IV BID  -Diet: Tube feed  -Code status: DNR  -Activity: IAT  -Bowel regimen: none: having diarrhea, has dignishield  -Urinary catheter: Pending RBUS  -Dispo: Vent unit- SNF when stable    Pending Nephro for long term HD then DC 62 yo M with  PMH of ICH (2021) s/p trach and PEG, HTN, HLD, T2DM, CAD s/p stents, Recurrent C diff, BIBEMS from [facility] for abnormal labs. Patient non-verbal at baseline - limited history. Per NH chart  have a BUN greater than 200 and creatinine of 4.3 on outpatient labs. Outpatient CXR also w/ new L sided pleural effusion. In ED, patient was hypotensive with Afib with RVR, found to be in acure renal failure, anemic with elevated trops. He was started on Amiodarone GTT, PPI GTT, gastric lavage with coffee ground secretions, started on broad spectrum abx and admitted to MICU  While in MICU, course complicated by DKA, s/p insulin drip, MICHELLE started on HD, acute anemia s/p PRBC transfusion, NSTEMI, downgraded to SDU -> floor    # Acute Renal Failure, started on HD 6/1  Fluid Overload  HAGMA  - non oliguric, RBUS without hydro   - started on HD 6/1 via R IJ [place]   - Cr much improved s/p HD yesterday, now 1.9  - Nephro on board- last HD 6/20  - continue bumex , document accurate UO   - c/w midodrine 10mg q8hr  - c/w sodium bicarb 1300mg PO TID  -  TTE 6/1 - EF 63%, GIDD   - On HD , monitor cr closely and fu nephro     creatinine trend  2.5 (06-28-23 @ 12:00)  2.0 (06-27-23 @ 06:36)  2.6 (06-26-23 @ 08:07)  2.4 (06-25-23 @ 08:10)    # diarrhea   cont [facility]   GI PCR Panel, Stool (12.23.21 @ 15:00)    Specimen Source: .Stool Feces   Culture Results:   GI PCR Results: NOT detected  *******Please Note:*******  GI panel PCR evaluates for:  Campylobacter, Plesiomonas shigelloides, Salmonella,  Vibrio, Yersinia enterocolitica, Enteroaggregative  Escherichia coli (EAEC), Enteropathogenic E.coli (EPEC),  Enterotoxigenic E. coli (ETEC) lt/st, Shiga-like  toxin-producing E. coli (STEC) stx1/stx2,  Shigella/ Enteroinvasive E. coli (EIEC), Cryptosporidium,  Cyclospora cayetanensis, Entamoeba histolytica,  Giardia lamblia, Adenovirus F 40/41, Astrovirus,  Norovirus GI/GII, Rotavirus A, Sapovirus    C Diff by PCR Result: [facility]: Method: CDPCR  TOXIGENIC CLOST.DIFFICILE NEGATIVE;  027-NAP1-B1 PRESUMPTIVE NEGATIVE.   (06.22.23 @ 12:20)        # chronic respiratory failure, vent dependant via trach , trach care, fu pulm   # functional dysphagia, sp peg, peg care   # fever, suspected VAP-improved  #Candiduria with U cx C tropicalis  -  CXR Further significant increase in left-sided opacities/effusion with minimal aeration of the left lung. Right-sided opacities redemonstrated    6/11 BCX NGTD     6/11 UCX   >100,000 CFU/ml Pseudomonas aeruginosa    6/11 sputum   Three or more mixed gram negative rods including    Moderate Pseudomonas aeruginosa (Carbapenem Resistant)    6/7 BCX NGTD   - ID consult 6/14: Recommended switching gentamicin to ceftolozane-tazobactam 2.25g IV x1, followed by 450mg IV q8h (HD dosing) for the treatment of pneumonia and UTI due to carbapenem-resistant Pseudomonas aeruginosa x 7 days - ended   - fluconazole, end 6/15    # Acute anemia- resolved s/p Multiple transfusion- Gastric lavage -ve  # Hyponatremia, likely hypervolemic-resolved- - c/w bumex 2mg iv q12hr and HD per nephro  # Afib w/ RVR - now rate controlled- c/w Amio and Cardizem  # Acute Decompensated CHF- resolved- c/w Diuresis  # CAD s/p stents  # NSTEMI type 2- Stable- seen by cardio 5/31,  c/w asa and statin, medical management for NSTEMI  # DKA, resolved- s/p insulin drip, now on basal/bolus and tube feeds. Monitor FS   # Hx ICH- Bedbound from NH- c/w keppra 500mg BID for seizure prophylaxis  # multiple sacral wounds. sacral and buttock , un-stageable- wound care nurse to reevaluate   # functional paraplegia , hx of ICH, has upper ext contracture total care       #Misc  -DVT prophylaxis: Heparin SQ  -GI prophylaxis: Protonix IV BID  -Diet: Tube feed  -Code status: DNR  -Activity: IAT  -Bowel regimen: none: having diarrhea, has dignishield  -Urinary catheter: Pending RBUS  -Dispo: Vent unit- SNF when stable    Pending Nephro for long term HD then DC 64 yo M with  PMH of ICH (2021) s/p trach and PEG, HTN, HLD, T2DM, CAD s/p stents, Recurrent C diff, BIBEMS from [facility] for abnormal labs. Patient non-verbal at baseline - limited history. Per NH chart  have a BUN greater than 200 and creatinine of 4.3 on outpatient labs. Outpatient CXR also w/ new L sided pleural effusion. In ED, patient was hypotensive with Afib with RVR, found to be in acure renal failure, anemic with elevated trops. He was started on Amiodarone GTT, PPI GTT, gastric lavage with coffee ground secretions, started on broad spectrum abx and admitted to MICU  While in MICU, course complicated by DKA, s/p insulin drip, MICHELLE started on HD, acute anemia s/p PRBC transfusion, NSTEMI, downgraded to SDU -> floor    # Acute Renal Failure, started on HD 6/1  # Fluid Overload- Improving- c/w Bumex and HD  # HAGMA- resolved  - non oliguric, RBUS without hydro   - started on HD 6/1 via [facility] [place]   - Nephro on board- cw HD  - s/p Midodrine- BP better, s/p Sodium Bicarb- acidosis improved  - TTE 6/1 - EF 63%, GIDD   - Fu Nephro regarding the plan for long term HD, then start DC planning to SNF    # Diarrhea- GI PCR -ve, C.diff -ve- cont dignishield   # chronic respiratory failure, vent dependant via trach , trach care  # functional dysphagia, sp peg, peg care   # Candiduria with U cx C tropicalis- s/p Fluconazole  # MDRO UTI and PNA- s/p Ceftolozane-tazobactam  # Acute anemia- resolved s/p Multiple transfusion- Gastric lavage -ve  # Hyponatremia, likely hypervolemic-resolved- - c/w bumex 2mg iv q12hr and HD per nephro  # Afib w/ RVR - now rate controlled- c/w Amio and Cardizem  # Acute Decompensated CHF- resolved- c/w Diuresis  # CAD s/p stents  # NSTEMI type 2- Stable- seen by cardio 5/31,  c/w asa and statin, medical management for NSTEMI  # DKA, resolved- s/p insulin drip, now on basal/bolus and tube feeds. Monitor FS   # Hx ICH- Bedbound from NH- c/w keppra 500mg BID for seizure prophylaxis  # multiple sacral wounds. sacral and buttock , un-stageable- wound care nurse to reevaluate   # functional paraplegia , hx of ICH, has upper ext contracture total care     #Misc  -DVT prophylaxis: Heparin SQ  -GI prophylaxis: Protonix IV BID  -Diet: Tube feed  -Code status: DNR  -Activity: IAT  -Bowel regimen: none: having diarrhea, has dignishield  -Urinary catheter: Recently removed, Pending RBUS  -Dispo: Vent unit- SNF when stable    Pending Nephro for long term HD then DC 62 yo M with  PMH of ICH (2021) s/p trach and PEG, HTN, HLD, T2DM, CAD s/p stents, Recurrent C diff, BIBEMS from [facility] for abnormal labs. Patient non-verbal at baseline - limited history. Per NH chart  have a BUN greater than 200 and creatinine of 4.3 on outpatient labs. Outpatient CXR also w/ new L sided pleural effusion. In ED, patient was hypotensive with Afib with RVR, found to be in acure renal failure, anemic with elevated trops. He was started on Amiodarone GTT, PPI GTT, gastric lavage with coffee ground secretions, started on broad spectrum abx and admitted to MICU  While in MICU, course complicated by DKA, s/p insulin drip, MICHELLE started on HD, acute anemia s/p PRBC transfusion, NSTEMI, downgraded to SDU -> floor    # Acute Renal Failure, started on HD 6/1  # Mild Hyperkalemia  # Fluid Overload- Improving- c/w Bumex and HD  # HAGMA- resolved  - non oliguric, RBUS without hydro   - started on HD 6/1 via R [facility] [place]   - Nephro on board- cw HD  - s/p Midodrine- BP better, s/p Sodium Bicarb- acidosis improved  - TTE 6/1 - EF 63%, GIDD   - Fu Nephro regarding the plan for long term HD, then start DC planning to SNF  - cw [place] and HD for high K- May need long term standing lokelma    # Diarrhea- GI PCR -ve, C.diff -ve- cont dignishield   # chronic respiratory failure, vent dependant via trach , trach care  # functional dysphagia, sp peg, peg care   # Candiduria with U cx C tropicalis- s/p Fluconazole  # MDRO UTI and PNA- s/p Ceftolozane-tazobactam  # Acute anemia- resolved s/p Multiple transfusion- Gastric lavage -ve  # Hyponatremia, likely hypervolemic-resolved- - c/w bumex 2mg iv q12hr and HD per nephro  # Afib w/ RVR - now rate controlled- c/w Amio and Cardizem  # Acute Decompensated CHF- resolved- c/w Diuresis  # CAD s/p stents  # NSTEMI type 2- Stable- seen by cardio 5/31,  c/w asa and statin, medical management for NSTEMI  # DKA, resolved- s/p insulin drip, now on basal/bolus and tube feeds. Monitor FS   # Hx ICH- Bedbound from NH- c/w keppra 500mg BID for seizure prophylaxis  # multiple sacral wounds. sacral and buttock , un-stageable- wound care nurse to reevaluate   # functional paraplegia , hx of ICH, has upper ext contracture total care     #Misc  -DVT prophylaxis: Heparin SQ  -GI prophylaxis: Protonix IV BID  -Diet: Tube feed  -Code status: DNR  -Activity: IAT  -Bowel regimen: none: having diarrhea, has dignishield  -Urinary catheter: Recently removed, Pending RBUS  -Dispo: Vent unit- SNF when stable    Pending Nephro for long term HD then DC

## 2023-06-30 NOTE — PROGRESS NOTE ADULT - ASSESSMENT
IMPRESSION:    Sepsis present on admission resolved  Candida UTI sp tx  Pseudomonas PNA sp abx  anemia  left side atelectasis improved  Bilateral pleural effusions likely volume overload   MICHELLE on CKD III on dialysis   NSTEMI likely type 2  Hyponatremia resolved   Paroxysmal Afib  Acute on chronic anemia suspected UGI Bleed  Chronic respiratory failure  H/o ICH s/p trach and PEG  H/o CAD      PLAN:    CNS: Avoid CNS depressants     HEENT: Oral and trach care    PULMONARY: HOB 45. Aspiration.  No vent changes.  Goal saturation 92-96%. Vent dependents.  Pulmonary toilet. not weanable    CARDIOVASCULAR: Avoid overload.      GI:  Feeding  Bowel regimen     RENAL: trend CMP.  Correct as needed.  Nephrology following. HD    INFECTIOUS DISEASE: ABX per ID.      HEMATOLOGICAL: DVT prophylaxis.       ENDOCRINE: Follow up FS. Insulin protocol if needed.    DNR    Poor prognosis

## 2023-06-30 NOTE — PROGRESS NOTE ADULT - ASSESSMENT
Skin assessed- R ankle healed ulcer with scare tissue formation - dry - Continue skin barrier to R ankle       Wound #1  Type of Wound: Stage 4 Pressure Injury  Location: Sacrum  Measurements: ~65koo6exh0.5cm  Tunneling /Undermining: Undermining ~7-12 o'clock   Wound bed:  Red   Wound edges: Intact   Periwound:  Pale pink, scare tissue formation   Wound exudate: None  Wound odor: No  Induration, erythema, warmth: No  Wound pain: No    Wound #2  Type of Wound: Unstageable Pressure Injury  Location: Left buttock  Measurements: ~6fzj4ho  Tunneling /Undermining: No  Wound bed: Yellow to brown devitalized tissue with pink outer border   Wound edges: Intact  Periwound: Intact  Wound exudate: None  Wound odor: No  Induration, erythema, warmth: No  Wound pain: No    Wound #3  Type of Wound: Unstageable Pressure Injury( healed scare tissue formation )   Location: R buttock  Measurements: ~5jub7cd  Tunneling /Undermining: No  Wound bed:  Marbleized with pink and brown skin tissue - appears  to be healed ulcer   Wound edges: Intact  Periwound: Intact  Wound exudate: None  Wound odor: No   Induration, erythema, warmth: No  Wound pain: No    Wound #4  Type of Wound: Stage 2 Pressure Injury  Location: Upper back  Measurements: ~4fle9fc  Tunneling /Undermining: No  Wound bed: Pink, no necrotic tissue  Wound edges: Macerated, erythema   Periwound: Intact  Wound exudate: None  Wound odor: No  Induration, erythema, warmth: No  Wound pain: No    Wound #5  Type of Wound: Deep tissue pressure injury to R heel   Location: Upper back  Measurements: ~2.5x2.5 cm   Tunneling /Undermining: No  Wound bed:  Intact  dark red skin tissue  dark red skin tissue   Wound edges:  Dry    Periwound: Intact  Wound exudate: None  Wound odor: No  Induration, erythema, warmth: No  Wound pain: No        Plan:  Wound and skin care recs.    Clean sacrum , B/L buttock and upper back wound with vashe wound cleanser, pat dry then apply hydrogel with moist vashe guaze dressing  Q 12 hours   Continue skin barrier  to R heel - monitor progression and inform  primary provider   Pressure  injury  preventive  measures  skin  and incontinence care   Assess wound and inform primary provider of any changes   Case discussed with primary Rn   Wound/ ostomy specialist  to f/u as needed     Offloading: [x ] Frequent position changes [ ] Devices/Equipment  Cleansing: [ ] Saline [ ] Soap/Water [x ] Other: ______  Topicals: [ ] Barrier Cream [ ] Antimicrobial [ x] Autolytic  Wound Debridement and moist wound healung   Dressings: [ ] Dry, sterile [ ] Allevyn  Foam [ ] Absorbant Pads [ ] Collagenase    Other Recs.    per primary team      Total time for bedside assessment , review of medical records  and  discussion of plan of care with primary team greater than 35 min

## 2023-06-30 NOTE — PROGRESS NOTE ADULT - SUBJECTIVE AND OBJECTIVE BOX
Over Night Events: events noted, vent dependant, uro/ renal reviewed, afebrile    PHYSICAL EXAM    ICU Vital Signs Last 24 Hrs  T(C): 36.3 (30 Jun 2023 05:00), Max: 36.7 (29 Jun 2023 12:40)  T(F): 97.3 (30 Jun 2023 05:00), Max: 98.1 (29 Jun 2023 12:40)  HR: 79 (30 Jun 2023 05:00) (76 - 83)  BP: 128/60 (30 Jun 2023 05:00) (103/67 - 133/72)  RR: 16 (30 Jun 2023 05:00) (16 - 18)  SpO2: 98% (30 Jun 2023 05:00) (98% - 100%)    O2 Parameters below as of 30 Jun 2023 05:00  Patient On (Oxygen Delivery Method): ventilator            General: ill looking  HEENT: trach          Lungs: Bilateral rhonchi  Cardiovascular: Regular   Abdomen: Soft, Positive BS  not following commands    06-28-23 @ 07:01  -  06-29-23 @ 07:00  --------------------------------------------------------  IN:    Enteral Tube Flush: 120 mL    Peptamen A.F.: 750 mL  Total IN: 870 mL    OUT:  Total OUT: 0 mL    Total NET: 870 mL      06-29-23 @ 07:01  -  06-30-23 @ 05:53  --------------------------------------------------------  IN:  Total IN: 0 mL    OUT:    Other (mL): 2000 mL  Total OUT: 2000 mL    Total NET: -2000 mL          LABS:                          7.8    10.45 )-----------( 499      ( 29 Jun 2023 13:32 )             23.9                                               06-29    133<L>  |  93<L>  |  71<HH>  ----------------------------<  97  5.5<H>   |  28  |  2.7<H>    Ca    8.8      29 Jun 2023 13:32  Phos  1.8     06-29  Mg     2.1     06-29    TPro  5.4<L>  /  Alb  2.0<L>  /  TBili  0.3  /  DBili  x   /  AST  20  /  ALT  19  /  AlkPhos  192<H>  06-29                                             Urinalysis Basic - ( 29 Jun 2023 13:32 )    Color: x / Appearance: x / SG: x / pH: x  Gluc: 97 mg/dL / Ketone: x  / Bili: x / Urobili: x   Blood: x / Protein: x / Nitrite: x   Leuk Esterase: x / RBC: x / WBC x   Sq Epi: x / Non Sq Epi: x / Bacteria: x                                                  LIVER FUNCTIONS - ( 29 Jun 2023 13:32 )  Alb: 2.0 g/dL / Pro: 5.4 g/dL / ALK PHOS: 192 U/L / ALT: 19 U/L / AST: 20 U/L / GGT: x                                                                                               Mode: AC/ CMV (Assist Control/ Continuous Mandatory Ventilation)  RR (machine): 14  TV (machine): 450  FiO2: 40  PEEP: 8  ITime: 1  MAP: 11  PIP: 24                                          MEDICATIONS  (STANDING):  acetylcysteine 20%  Inhalation 4 milliLiter(s) Inhalation every 6 hours  albuterol/ipratropium for Nebulization 3 milliLiter(s) Nebulizer every 6 hours  aMIOdarone    Tablet 200 milliGRAM(s) Oral daily  aMIOdarone    Tablet   Oral   aspirin  chewable 81 milliGRAM(s) Oral daily  atorvastatin 80 milliGRAM(s) Oral at bedtime  buMETAnide Injectable 2 milliGRAM(s) IV Push every 12 hours  chlorhexidine 0.12% Liquid 15 milliLiter(s) Oral Mucosa two times a day  chlorhexidine 2% Cloths 1 Application(s) Topical <User Schedule>  collagenase Ointment 1 Application(s) Topical two times a day  dextrose 5%. 1000 milliLiter(s) (100 mL/Hr) IV Continuous <Continuous>  dextrose 5%. 1000 milliLiter(s) (50 mL/Hr) IV Continuous <Continuous>  dextrose 50% Injectable 25 Gram(s) IV Push once  dextrose 50% Injectable 12.5 Gram(s) IV Push once  dextrose 50% Injectable 25 Gram(s) IV Push once  diltiazem    milliGRAM(s) Oral daily  glucagon  Injectable 1 milliGRAM(s) IntraMuscular once  heparin   Injectable 5000 Unit(s) SubCutaneous every 12 hours  insulin glargine Injectable (LANTUS) 10 Unit(s) SubCutaneous at bedtime  insulin lispro (ADMELOG) corrective regimen sliding scale   SubCutaneous three times a day before meals  levETIRAcetam  Solution 500 milliGRAM(s) Oral two times a day  pantoprazole  Injectable 40 milliGRAM(s) IV Push every 12 hours  silver sulfADIAZINE 1% Cream 1 Application(s) Topical every 12 hours  sodium phosphate 30 milliMole(s)/500 mL IVPB 30 milliMole(s) IV Intermittent once  sodium zirconium cyclosilicate 10 Gram(s) Oral every 8 hours    MEDICATIONS  (PRN):  acetaminophen     Tablet .. 650 milliGRAM(s) Oral every 6 hours PRN Temp greater or equal to 38C (100.4F), Mild Pain (1 - 3)  dextrose Oral Gel 15 Gram(s) Oral once PRN Blood Glucose LESS THAN 70 milliGRAM(s)/deciliter  midodrine. 2.5 milliGRAM(s) Oral three times a day PRN SBP < 110  sodium chloride 0.9% Bolus. 100 milliLiter(s) IV Bolus every 5 minutes PRN SBP LESS THAN or EQUAL to 80 mmHg  sodium chloride 0.9% Bolus. 100 milliLiter(s) IV Bolus every 5 minutes PRN SBP LESS THAN or EQUAL to 100 mmHg      Xrays:                                                                                     ECHO

## 2023-07-01 NOTE — PROGRESS NOTE ADULT - SUBJECTIVE AND OBJECTIVE BOX
---------Daily Progress Note-------    History of Present Illness:       24H events:    Patient is a 63y old Male who presents with a chief complaint of Abnormal labs (01 Jul 2023 07:55)    Primary diagnosis of MICHELLE (acute kidney injury)      Today is hospital day 32d. This morning patient was seen and examined at bedside, resting comfortably in bed.    No acute or major events overnight.    Code Status: DNR      PAST MEDICAL & SURGICAL HISTORY  HTN (hypertension)    Diabetes mellitus    H/O intracranial hemorrhage    H/O tracheostomy    PEG (percutaneous endoscopic gastrostomy) status    Hyperlipidemia      SOCIAL HISTORY:  Social History:      ALLERGIES:  penicillin (Other)    MEDICATIONS:  STANDING MEDICATIONS  acetylcysteine 20%  Inhalation 4 milliLiter(s) Inhalation every 6 hours  albuterol/ipratropium for Nebulization 3 milliLiter(s) Nebulizer every 6 hours  aMIOdarone    Tablet 200 milliGRAM(s) Oral daily  aMIOdarone    Tablet   Oral   aspirin  chewable 81 milliGRAM(s) Oral daily  atorvastatin 80 milliGRAM(s) Oral at bedtime  buMETAnide Injectable 2 milliGRAM(s) IV Push every 12 hours  chlorhexidine 0.12% Liquid 15 milliLiter(s) Oral Mucosa two times a day  chlorhexidine 2% Cloths 1 Application(s) Topical <User Schedule>  dextrose 5%. 1000 milliLiter(s) IV Continuous <Continuous>  dextrose 5%. 1000 milliLiter(s) IV Continuous <Continuous>  dextrose 50% Injectable 12.5 Gram(s) IV Push once  dextrose 50% Injectable 25 Gram(s) IV Push once  dextrose 50% Injectable 25 Gram(s) IV Push once  diltiazem    milliGRAM(s) Oral daily  glucagon  Injectable 1 milliGRAM(s) IntraMuscular once  heparin   Injectable 5000 Unit(s) SubCutaneous every 12 hours  insulin glargine Injectable (LANTUS) 10 Unit(s) SubCutaneous at bedtime  insulin lispro (ADMELOG) corrective regimen sliding scale   SubCutaneous three times a day before meals  levETIRAcetam  Solution 500 milliGRAM(s) Oral two times a day  pantoprazole  Injectable 40 milliGRAM(s) IV Push every 12 hours  sodium phosphate 30 milliMole(s)/500 mL IVPB 30 milliMole(s) IV Intermittent once    PRN MEDICATIONS  acetaminophen     Tablet .. 650 milliGRAM(s) Oral every 6 hours PRN  dextrose Oral Gel 15 Gram(s) Oral once PRN  sodium chloride 0.9% Bolus. 100 milliLiter(s) IV Bolus every 5 minutes PRN  sodium chloride 0.9% Bolus. 100 milliLiter(s) IV Bolus every 5 minutes PRN  sodium chloride 0.9% Bolus. 100 milliLiter(s) IV Bolus every 5 minutes PRN    VITALS:   T(F): 98.2  HR: 81  BP: 106/56  RR: 18  SpO2: 100%    PHYSICAL EXAM:  GENERAL: NAD, lying in bed   HEENT: Atraumatic, neck trach in place  LUNGS: B/L air entry, no adventitious breath sounds   HEART: Regular rate and rhythm. Normal s1s2.    ABD: Soft, non-tender, non-distended. Bowel sounds present. Peg in place.   EXT: B/L LE pitting edema   NEURO: AAOX0      LABS:                        7.8    10.45 )-----------( 499      ( 29 Jun 2023 13:32 )             23.9     06-29    133<L>  |  93<L>  |  71<HH>  ----------------------------<  97  5.5<H>   |  28  |  2.7<H>    Ca    8.8      29 Jun 2023 13:32  Phos  1.8     06-29  Mg     2.1     06-29    TPro  5.4<L>  /  Alb  2.0<L>  /  TBili  0.3  /  DBili  x   /  AST  20  /  ALT  19  /  AlkPhos  192<H>  06-29      Urinalysis Basic - ( 29 Jun 2023 13:32 )    Color: x / Appearance: x / SG: x / pH: x  Gluc: 97 mg/dL / Ketone: x  / Bili: x / Urobili: x   Blood: x / Protein: x / Nitrite: x   Leuk Esterase: x / RBC: x / WBC x   Sq Epi: x / Non Sq Epi: x / Bacteria: x                RADIOLOGY:           ---------Daily Progress Note-------    History of Present Illness:   62 yo M with  PMH of ICH (2021) s/p trach and PEG, HTN, HLD, T2DM, CAD s/p stents, Recurrent C diff, BIBEMS from [facility] for abnormal labs. Patient non-verbal at baseline - limited history. Per NH chart have a BUN greater than 200 and creatinine of 4.3 on outpatient labs. Outpatient CXR also w/ new L sided pleural effusion. With EMS patient was also found to be in irregular rhythm, no known history of A-fib or a flutter.  In ED patient had borderline BP, and was in Atrial fibrillation. He was started on amiodarone gtt. Labs showed , Cr. 4.1, Hb 6.6. Trops 1.8. He was given 1U PRBC, gastric lavage revealed coffee ground emesis w/no active bleeding. He was started on PPI drip and GI consulted in ED. He was given IV aztreonam and vancomycin.   Patient admitted to ICU for management of Sepsis from UTI, MICHELLE likely prerenal from anemia and diarrhea and NSTEMI. TTE with EF of 63%, G1DD. NSTEMI stable, cardiology recommended medical management with aspirin and statin. Found to have Candiduria s/p fluconzaole and ceftolozane-tazobactam, DKA s/p insulin drip (currently on glargine 10units at bedtime with FS in 130s-170s), acute rental failure with urine production s/p R IJ U-dall placed on 6/1, hyperkalemia requiring HD and Lokelma, acute anemia requiring multiple transfusions (most recent 6/28, Hgb 6.5->7.8). Nephrology continuing to follow, patient will likely need TDC if no improvement in kidney function (most recent creatinine 2.4, baseline around 1.1).    24H events:    Patient is a 63y old Male who presents with a chief complaint of Abnormal labs (01 Jul 2023 07:55)    Primary diagnosis of MICHELLE (acute kidney injury)      Today is hospital day 32d. This morning patient was seen and examined at bedside, resting comfortably in bed.    No acute or major events overnight.    Code Status: DNR      PAST MEDICAL & SURGICAL HISTORY  HTN (hypertension)    Diabetes mellitus    H/O intracranial hemorrhage    H/O tracheostomy    PEG (percutaneous endoscopic gastrostomy) status    Hyperlipidemia      SOCIAL HISTORY:  Social History:      ALLERGIES:  penicillin (Other)    MEDICATIONS:  STANDING MEDICATIONS  acetylcysteine 20%  Inhalation 4 milliLiter(s) Inhalation every 6 hours  albuterol/ipratropium for Nebulization 3 milliLiter(s) Nebulizer every 6 hours  aMIOdarone    Tablet 200 milliGRAM(s) Oral daily  aMIOdarone    Tablet   Oral   aspirin  chewable 81 milliGRAM(s) Oral daily  atorvastatin 80 milliGRAM(s) Oral at bedtime  buMETAnide Injectable 2 milliGRAM(s) IV Push every 12 hours  chlorhexidine 0.12% Liquid 15 milliLiter(s) Oral Mucosa two times a day  chlorhexidine 2% Cloths 1 Application(s) Topical <User Schedule>  dextrose 5%. 1000 milliLiter(s) IV Continuous <Continuous>  dextrose 5%. 1000 milliLiter(s) IV Continuous <Continuous>  dextrose 50% Injectable 12.5 Gram(s) IV Push once  dextrose 50% Injectable 25 Gram(s) IV Push once  dextrose 50% Injectable 25 Gram(s) IV Push once  diltiazem    milliGRAM(s) Oral daily  glucagon  Injectable 1 milliGRAM(s) IntraMuscular once  heparin   Injectable 5000 Unit(s) SubCutaneous every 12 hours  insulin glargine Injectable (LANTUS) 10 Unit(s) SubCutaneous at bedtime  insulin lispro (ADMELOG) corrective regimen sliding scale   SubCutaneous three times a day before meals  levETIRAcetam  Solution 500 milliGRAM(s) Oral two times a day  pantoprazole  Injectable 40 milliGRAM(s) IV Push every 12 hours  sodium phosphate 30 milliMole(s)/500 mL IVPB 30 milliMole(s) IV Intermittent once    PRN MEDICATIONS  acetaminophen     Tablet .. 650 milliGRAM(s) Oral every 6 hours PRN  dextrose Oral Gel 15 Gram(s) Oral once PRN  sodium chloride 0.9% Bolus. 100 milliLiter(s) IV Bolus every 5 minutes PRN  sodium chloride 0.9% Bolus. 100 milliLiter(s) IV Bolus every 5 minutes PRN  sodium chloride 0.9% Bolus. 100 milliLiter(s) IV Bolus every 5 minutes PRN    VITALS:   T(F): 98.2  HR: 81  BP: 106/56  RR: 18  SpO2: 100%    PHYSICAL EXAM:  GENERAL: NAD, lying in bed   HEENT: Atraumatic, neck trach in place  LUNGS: B/L air entry, no adventitious breath sounds   HEART: Regular rate and rhythm. Normal s1s2.    ABD: Soft, non-tender, non-distended. Bowel sounds present. Peg in place.   EXT: B/L LE pitting edema   NEURO: AAOX0      LABS:                        7.8    10.45 )-----------( 499      ( 29 Jun 2023 13:32 )             23.9     06-29    133<L>  |  93<L>  |  71<HH>  ----------------------------<  97  5.5<H>   |  28  |  2.7<H>    Ca    8.8      29 Jun 2023 13:32  Phos  1.8     06-29  Mg     2.1     06-29    TPro  5.4<L>  /  Alb  2.0<L>  /  TBili  0.3  /  DBili  x   /  AST  20  /  ALT  19  /  AlkPhos  192<H>  06-29      Urinalysis Basic - ( 29 Jun 2023 13:32 )    Color: x / Appearance: x / SG: x / pH: x  Gluc: 97 mg/dL / Ketone: x  / Bili: x / Urobili: x   Blood: x / Protein: x / Nitrite: x   Leuk Esterase: x / RBC: x / WBC x   Sq Epi: x / Non Sq Epi: x / Bacteria: x                RADIOLOGY:  < from: US Kidney and Bladder (06.30.23 @ 18:37) >  ACC: [id number] EXAM:  US KIDNEYS AND BLADDER   ORDERED BY: JAI BEHGAL     PROCEDURE DATE:  06/30/2023          INTERPRETATION:  CLINICAL INFORMATION: Acute kidney injury    COMPARISON: Sonogram dated 5/31/2023    TECHNIQUE: Sonography of the kidneysand bladder.    FINDINGS:    Right kidney: 12.6 cm Echogenic.. No hydronephrosis.    Left kidney:  11.7 cm. Echogenic. No hydronephrosis. 3.0 cm cyst.    Urinary bladder: Patient voided prior to exam. Thick-walled bladder with   volume of 130 cc.    IMPRESSION:    Echogenic kidneys compatible with parenchymal disease.    No hydronephrosis.    Thick-walled bladder. Correlate with urinalysis.    --- End of Report ---    < end of copied text >           ---------Daily Progress Note-------    History of Present Illness:   64 yo M with  PMH of ICH (2021) s/p trach and PEG, HTN, HLD, T2DM, CAD s/p stents, Recurrent C diff, BIBEMS from [facility] for abnormal labs. Patient non-verbal at baseline - limited history. Per NH chart have a BUN greater than 200 and creatinine of 4.3 on outpatient labs. Outpatient CXR also w/ new L sided pleural effusion. With EMS patient was also found to be in irregular rhythm, no known history of A-fib or a flutter.  In ED patient had borderline BP, and was in Atrial fibrillation. He was started on amiodarone gtt. Labs showed , Cr. 4.1, Hb 6.6. Trops 1.8. He was given 1U PRBC, gastric lavage revealed coffee ground emesis w/no active bleeding. He was started on PPI drip and GI consulted in ED. He was given IV aztreonam and vancomycin.   Patient admitted to ICU for management of Sepsis from UTI, MICHELLE likely prerenal from anemia and diarrhea and NSTEMI. TTE with EF of 63%, G1DD. NSTEMI stable, cardiology recommended medical management with aspirin and statin. Found to have Candiduria s/p fluconzaole and ceftolozane-tazobactam, DKA s/p insulin drip (currently on glargine 10units at bedtime with FS in 130s-170s), acute rental failure with urine production s/p R IJ U-dall placed on 6/1, hyperkalemia requiring HD and Lokelma, acute anemia requiring multiple transfusions (most recent 6/28, Hgb 6.5->7.8). Nephrology continuing to follow, patient to receive TDC next week.    24H events:    Patient is a 63y old Male who presents with a chief complaint of Abnormal labs (01 Jul 2023 07:55)    Primary diagnosis of MICHELLE (acute kidney injury)      Today is hospital day 32d. This morning patient was seen and examined at bedside, resting comfortably in bed.    No acute or major events overnight.    Code Status: DNR      PAST MEDICAL & SURGICAL HISTORY  HTN (hypertension)    Diabetes mellitus    H/O intracranial hemorrhage    H/O tracheostomy    PEG (percutaneous endoscopic gastrostomy) status    Hyperlipidemia      SOCIAL HISTORY:  Social History:      ALLERGIES:  penicillin (Other)    MEDICATIONS:  STANDING MEDICATIONS  acetylcysteine 20%  Inhalation 4 milliLiter(s) Inhalation every 6 hours  albuterol/ipratropium for Nebulization 3 milliLiter(s) Nebulizer every 6 hours  aMIOdarone    Tablet 200 milliGRAM(s) Oral daily  aMIOdarone    Tablet   Oral   aspirin  chewable 81 milliGRAM(s) Oral daily  atorvastatin 80 milliGRAM(s) Oral at bedtime  buMETAnide Injectable 2 milliGRAM(s) IV Push every 12 hours  chlorhexidine 0.12% Liquid 15 milliLiter(s) Oral Mucosa two times a day  chlorhexidine 2% Cloths 1 Application(s) Topical <User Schedule>  dextrose 5%. 1000 milliLiter(s) IV Continuous <Continuous>  dextrose 5%. 1000 milliLiter(s) IV Continuous <Continuous>  dextrose 50% Injectable 12.5 Gram(s) IV Push once  dextrose 50% Injectable 25 Gram(s) IV Push once  dextrose 50% Injectable 25 Gram(s) IV Push once  diltiazem    milliGRAM(s) Oral daily  glucagon  Injectable 1 milliGRAM(s) IntraMuscular once  heparin   Injectable 5000 Unit(s) SubCutaneous every 12 hours  insulin glargine Injectable (LANTUS) 10 Unit(s) SubCutaneous at bedtime  insulin lispro (ADMELOG) corrective regimen sliding scale   SubCutaneous three times a day before meals  levETIRAcetam  Solution 500 milliGRAM(s) Oral two times a day  pantoprazole  Injectable 40 milliGRAM(s) IV Push every 12 hours  sodium phosphate 30 milliMole(s)/500 mL IVPB 30 milliMole(s) IV Intermittent once    PRN MEDICATIONS  acetaminophen     Tablet .. 650 milliGRAM(s) Oral every 6 hours PRN  dextrose Oral Gel 15 Gram(s) Oral once PRN  sodium chloride 0.9% Bolus. 100 milliLiter(s) IV Bolus every 5 minutes PRN  sodium chloride 0.9% Bolus. 100 milliLiter(s) IV Bolus every 5 minutes PRN  sodium chloride 0.9% Bolus. 100 milliLiter(s) IV Bolus every 5 minutes PRN    VITALS:   T(F): 98.2  HR: 81  BP: 106/56  RR: 18  SpO2: 100%    PHYSICAL EXAM:  GENERAL: NAD, lying in bed   HEENT: Atraumatic, neck trach in place  LUNGS: B/L air entry, no adventitious breath sounds   HEART: Regular rate and rhythm. Normal s1s2.    ABD: Soft, non-tender, non-distended. Bowel sounds present. Peg in place.   EXT: B/L LE pitting edema   NEURO: AAOX0      LABS:                        7.8    10.45 )-----------( 499      ( 29 Jun 2023 13:32 )             23.9     06-29    133<L>  |  93<L>  |  71<HH>  ----------------------------<  97  5.5<H>   |  28  |  2.7<H>    Ca    8.8      29 Jun 2023 13:32  Phos  1.8     06-29  Mg     2.1     06-29    TPro  5.4<L>  /  Alb  2.0<L>  /  TBili  0.3  /  DBili  x   /  AST  20  /  ALT  19  /  AlkPhos  192<H>  06-29      Urinalysis Basic - ( 29 Jun 2023 13:32 )    Color: x / Appearance: x / SG: x / pH: x  Gluc: 97 mg/dL / Ketone: x  / Bili: x / Urobili: x   Blood: x / Protein: x / Nitrite: x   Leuk Esterase: x / RBC: x / WBC x   Sq Epi: x / Non Sq Epi: x / Bacteria: x                RADIOLOGY:  < from: US Kidney and Bladder (06.30.23 @ 18:37) >  ACC: [id number] EXAM:  US KIDNEYS AND BLADDER   ORDERED BY: JAI BEHGAL     PROCEDURE DATE:  06/30/2023          INTERPRETATION:  CLINICAL INFORMATION: Acute kidney injury    COMPARISON: Sonogram dated 5/31/2023    TECHNIQUE: Sonography of the kidneysand bladder.    FINDINGS:    Right kidney: 12.6 cm Echogenic.. No hydronephrosis.    Left kidney:  11.7 cm. Echogenic. No hydronephrosis. 3.0 cm cyst.    Urinary bladder: Patient voided prior to exam. Thick-walled bladder with   volume of 130 cc.    IMPRESSION:    Echogenic kidneys compatible with parenchymal disease.    No hydronephrosis.    Thick-walled bladder. Correlate with urinalysis.    --- End of Report ---    < end of copied text >           ---------Daily Progress Note-------    History of Present Illness:   64 yo M with  PMH of ICH (2021) s/p trach and PEG, HTN, HLD, T2DM, CAD s/p stents, Recurrent C diff, BIBEMS from [facility] for abnormal labs. Patient non-verbal at baseline - limited history. Per NH chart have a BUN greater than 200 and creatinine of 4.3 on outpatient labs. Outpatient CXR also w/ new L sided pleural effusion. With EMS patient was also found to be in irregular rhythm, no known history of A-fib or a flutter.  In ED patient had borderline BP, and was in Atrial fibrillation. He was started on amiodarone gtt. Labs showed , Cr. 4.1, Hb 6.6. Trops 1.8. He was given 1U PRBC, gastric lavage revealed coffee ground emesis w/no active bleeding. He was started on PPI drip and GI consulted in ED. He was given IV aztreonam and vancomycin.   Patient admitted to ICU for management of Sepsis from UTI, MICHELLE likely prerenal from anemia and diarrhea and NSTEMI. TTE with EF of 63%, G1DD. NSTEMI stable, cardiology recommended medical management with aspirin and statin. Found to have Candiduria s/p fluconzaole and ceftolozane-tazobactam, DKA s/p insulin drip (currently on glargine 10units at bedtime with FS in 130s-170s), acute rental failure with urine production s/p R IJ U-dall placed on 6/1, hyperkalemia requiring HD and Lokelma, acute anemia requiring multiple transfusions (most recent 6/28, Hgb 6.5->7.8). Nephrology continuing to follow, patient to receive TDC next week.    24H events:    Patient is a 63y old Male who presents with a chief complaint of Abnormal labs (01 Jul 2023 07:55)    Primary diagnosis of MICHELLE (acute kidney injury)      Today is hospital day 32d. This morning patient was seen and examined at bedside, resting comfortably in bed.    No acute or major events overnight.    Code Status: DNR    Family Communication: Called wife, updated on likely TDC placement next week     PAST MEDICAL & SURGICAL HISTORY  HTN (hypertension)    Diabetes mellitus    H/O intracranial hemorrhage    H/O tracheostomy    PEG (percutaneous endoscopic gastrostomy) status    Hyperlipidemia      SOCIAL HISTORY:  Social History:      ALLERGIES:  penicillin (Other)    MEDICATIONS:  STANDING MEDICATIONS  acetylcysteine 20%  Inhalation 4 milliLiter(s) Inhalation every 6 hours  albuterol/ipratropium for Nebulization 3 milliLiter(s) Nebulizer every 6 hours  aMIOdarone    Tablet 200 milliGRAM(s) Oral daily  aMIOdarone    Tablet   Oral   aspirin  chewable 81 milliGRAM(s) Oral daily  atorvastatin 80 milliGRAM(s) Oral at bedtime  buMETAnide Injectable 2 milliGRAM(s) IV Push every 12 hours  chlorhexidine 0.12% Liquid 15 milliLiter(s) Oral Mucosa two times a day  chlorhexidine 2% Cloths 1 Application(s) Topical <User Schedule>  dextrose 5%. 1000 milliLiter(s) IV Continuous <Continuous>  dextrose 5%. 1000 milliLiter(s) IV Continuous <Continuous>  dextrose 50% Injectable 12.5 Gram(s) IV Push once  dextrose 50% Injectable 25 Gram(s) IV Push once  dextrose 50% Injectable 25 Gram(s) IV Push once  diltiazem    milliGRAM(s) Oral daily  glucagon  Injectable 1 milliGRAM(s) IntraMuscular once  heparin   Injectable 5000 Unit(s) SubCutaneous every 12 hours  insulin glargine Injectable (LANTUS) 10 Unit(s) SubCutaneous at bedtime  insulin lispro (ADMELOG) corrective regimen sliding scale   SubCutaneous three times a day before meals  levETIRAcetam  Solution 500 milliGRAM(s) Oral two times a day  pantoprazole  Injectable 40 milliGRAM(s) IV Push every 12 hours  sodium phosphate 30 milliMole(s)/500 mL IVPB 30 milliMole(s) IV Intermittent once    PRN MEDICATIONS  acetaminophen     Tablet .. 650 milliGRAM(s) Oral every 6 hours PRN  dextrose Oral Gel 15 Gram(s) Oral once PRN  sodium chloride 0.9% Bolus. 100 milliLiter(s) IV Bolus every 5 minutes PRN  sodium chloride 0.9% Bolus. 100 milliLiter(s) IV Bolus every 5 minutes PRN  sodium chloride 0.9% Bolus. 100 milliLiter(s) IV Bolus every 5 minutes PRN    VITALS:   T(F): 98.2  HR: 81  BP: 106/56  RR: 18  SpO2: 100%    PHYSICAL EXAM:  GENERAL: NAD, lying in bed   HEENT: Atraumatic, neck trach in place  LUNGS: B/L air entry, no adventitious breath sounds   HEART: Regular rate and rhythm. Normal s1s2.    ABD: Soft, non-tender, non-distended. Bowel sounds present. Peg in place.   EXT: B/L LE pitting edema   NEURO: AAOX0      LABS:                        7.8    10.45 )-----------( 499      ( 29 Jun 2023 13:32 )             23.9     06-29    133<L>  |  93<L>  |  71<HH>  ----------------------------<  97  5.5<H>   |  28  |  2.7<H>    Ca    8.8      29 Jun 2023 13:32  Phos  1.8     06-29  Mg     2.1     06-29    TPro  5.4<L>  /  Alb  2.0<L>  /  TBili  0.3  /  DBili  x   /  AST  20  /  ALT  19  /  AlkPhos  192<H>  06-29      Urinalysis Basic - ( 29 Jun 2023 13:32 )    Color: x / Appearance: x / SG: x / pH: x  Gluc: 97 mg/dL / Ketone: x  / Bili: x / Urobili: x   Blood: x / Protein: x / Nitrite: x   Leuk Esterase: x / RBC: x / WBC x   Sq Epi: x / Non Sq Epi: x / Bacteria: x                RADIOLOGY:  < from: US Kidney and Bladder (06.30.23 @ 18:37) >  ACC: [id number] EXAM:  US KIDNEYS AND BLADDER   ORDERED BY: JAI BEHGAL     PROCEDURE DATE:  06/30/2023          INTERPRETATION:  CLINICAL INFORMATION: Acute kidney injury    COMPARISON: Sonogram dated 5/31/2023    TECHNIQUE: Sonography of the kidneysand bladder.    FINDINGS:    Right kidney: 12.6 cm Echogenic.. No hydronephrosis.    Left kidney:  11.7 cm. Echogenic. No hydronephrosis. 3.0 cm cyst.    Urinary bladder: Patient voided prior to exam. Thick-walled bladder with   volume of 130 cc.    IMPRESSION:    Echogenic kidneys compatible with parenchymal disease.    No hydronephrosis.    Thick-walled bladder. Correlate with urinalysis.    --- End of Report ---    < end of copied text >

## 2023-07-01 NOTE — PROGRESS NOTE ADULT - SUBJECTIVE AND OBJECTIVE BOX
seen and examined  24 h events noted   trach/vent       PAST HISTORY  --------------------------------------------------------------------------------  No significant changes to PMH, PSH, FHx, SHx, unless otherwise noted    ALLERGIES & MEDICATIONS  --------------------------------------------------------------------------------  Allergies    penicillin (Other)    Intolerances      Standing Inpatient Medications  acetylcysteine 20%  Inhalation 4 milliLiter(s) Inhalation every 6 hours  albuterol/ipratropium for Nebulization 3 milliLiter(s) Nebulizer every 6 hours  aMIOdarone    Tablet 200 milliGRAM(s) Oral daily  aMIOdarone    Tablet   Oral   aspirin  chewable 81 milliGRAM(s) Oral daily  atorvastatin 80 milliGRAM(s) Oral at bedtime  buMETAnide Injectable 2 milliGRAM(s) IV Push every 12 hours  chlorhexidine 0.12% Liquid 15 milliLiter(s) Oral Mucosa two times a day  chlorhexidine 2% Cloths 1 Application(s) Topical <User Schedule>  dextrose 5%. 1000 milliLiter(s) IV Continuous <Continuous>  dextrose 5%. 1000 milliLiter(s) IV Continuous <Continuous>  dextrose 50% Injectable 12.5 Gram(s) IV Push once  dextrose 50% Injectable 25 Gram(s) IV Push once  dextrose 50% Injectable 25 Gram(s) IV Push once  diltiazem    milliGRAM(s) Oral daily  glucagon  Injectable 1 milliGRAM(s) IntraMuscular once  heparin   Injectable 5000 Unit(s) SubCutaneous every 12 hours  insulin glargine Injectable (LANTUS) 10 Unit(s) SubCutaneous at bedtime  insulin lispro (ADMELOG) corrective regimen sliding scale   SubCutaneous three times a day before meals  levETIRAcetam  Solution 500 milliGRAM(s) Oral two times a day  pantoprazole  Injectable 40 milliGRAM(s) IV Push every 12 hours  sodium phosphate 30 milliMole(s)/500 mL IVPB 30 milliMole(s) IV Intermittent once    PRN Inpatient Medications  acetaminophen     Tablet .. 650 milliGRAM(s) Oral every 6 hours PRN  dextrose Oral Gel 15 Gram(s) Oral once PRN  sodium chloride 0.9% Bolus. 100 milliLiter(s) IV Bolus every 5 minutes PRN  sodium chloride 0.9% Bolus. 100 milliLiter(s) IV Bolus every 5 minutes PRN        VITALS/PHYSICAL EXAM  --------------------------------------------------------------------------------  T(C): 36.8 (07-01-23 @ 05:37), Max: 37.5 (06-30-23 @ 13:45)  HR: 81 (07-01-23 @ 05:37) (75 - 83)  BP: 106/56 (07-01-23 @ 05:37) (106/56 - 120/89)  RR: 18 (07-01-23 @ 05:37) (18 - 20)  SpO2: 100% (07-01-23 @ 05:37) (100% - 100%)  Wt(kg): --        06-30-23 @ 07:01  -  07-01-23 @ 07:00  --------------------------------------------------------  IN: 1740 mL / OUT: 0 mL / NET: 1740 mL      Physical Exam:  	Gen: trach/vent  	Pulm:  B/L aline  	CV:  S1S2; no rub  	Abd: distended  	LE: edema  	Vascular access: terrance    LABS/STUDIES  --------------------------------------------------------------------------------              7.8    10.45 >-----------<  499      [06-29-23 @ 13:32]              23.9     133  |  93  |  71  ----------------------------<  97      [06-29-23 @ 13:32]  5.5   |  28  |  2.7        Ca     8.8     [06-29-23 @ 13:32]      Mg     2.1     [06-29-23 @ 13:32]      Phos  1.8     [06-29-23 @ 13:32]    TPro  5.4  /  Alb  2.0  /  TBili  0.3  /  DBili  x   /  AST  20  /  ALT  19  /  AlkPhos  192  [06-29-23 @ 13:32]      Creatinine Trend:  SCr 2.7 [06-29 @ 13:32]  SCr 2.5 [06-28 @ 12:00]  SCr 2.0 [06-27 @ 06:36]  SCr 2.6 [06-26 @ 08:07]  SCr 2.4 [06-25 @ 08:10]    Urinalysis - [06-29-23 @ 13:32]      Color  / Appearance  / SG  / pH       Gluc 97 / Ketone   / Bili  / Urobili        Blood  / Protein  / Leuk Est  / Nitrite       RBC  / WBC  / Hyaline  / Gran  / Sq Epi  / Non Sq Epi  / Bacteria       Iron 51, TIBC 111, %sat 46      [06-06-23 @ 10:40]  Ferritin 1956      [06-06-23 @ 10:40]  Vitamin D (25OH) 70      [06-03-23 @ 06:50]    HBsAb <3.0      [06-01-23 @ 22:50]  HBsAb Nonreact      [06-01-23 @ 22:50]  HBsAg Nonreact      [06-01-23 @ 22:50]  HBcAb Nonreact      [06-01-23 @ 22:50]  HIV Nonreact      [06-08-23 @ 16:00]  HIV Nonreact      [06-08-23 @ 12:20]

## 2023-07-01 NOTE — PROGRESS NOTE ADULT - ASSESSMENT
62 yo M with  PMH of ICH (2021) s/p trach and PEG, HTN, HLD, T2DM, CAD s/p stents, Recurrent C diff, BIBEMS from [facility] for abnormal labs. Patient non-verbal at baseline - limited history. Per NH chart  have a BUN greater than 200 and creatinine of 4.3 on outpatient labs. Outpatient CXR also w/ new L sided pleural effusion. In ED, patient was hypotensive with Afib with RVR, found to be in acure renal failure, anemic with elevated trops. He was started on Amiodarone GTT, PPI GTT, gastric lavage with coffee ground secretions, started on broad spectrum abx and admitted to MICU  While in MICU, course complicated by DKA, s/p insulin drip, MICHELLE started on HD, acute anemia s/p PRBC transfusion, NSTEMI, downgraded to SDU -> floor    # Acute Renal Failure, started on HD 6/1  # Mild Hyperkalemia  # Fluid Overload- Improving- c/w Bumex and HD  # HAGMA- resolved  - non oliguric, RBUS without hydro   - started on HD 6/1 via [facility]   - Nephro on board- cw HD  - s/p Midodrine- BP better, s/p Sodium Bicarb- acidosis improved  - TTE 6/1 - EF 63%, GIDD   - Fu Nephro regarding the plan for long term HD, then start DC planning to SNF  - cw Lokelma and HD for high K- May need long term standing lokelma    # Diarrhea   GI PCR -ve, C.diff -ve- cont dignishield     # chronic respiratory failure,   vent dependant via trach , trach care    # functional dysphagia,   sp peg, peg care     # Candiduria with U cx C tropicalis  # MDRO UTI and PNA-  s/p Fluconazole  s/p Ceftolozane-tazobactam    # Acute anemia  -gatric lavage negative   resolved s/p Multiple transfusion    # Hyponatremia-resolved  -likely hypervolemic  -c/w bumex 2mg iv q12hr and HD per nephro    # Afib w/ RVR -   # Acute Decompensated CHF  # CAD s/p stents  # NSTEMI type 2  -now rate controlled- c/w Amio and Cardizem  -CHF resolved s/p diuresis  -Seen by cardio 5/31, recommend c/w asa and stating, medical management for NSTEMI    # DKA, resolved- s/p insulin drip, now on basal/bolus and tube feeds. Monitor FS   # Hx ICH- Bedbound from NH- c/w keppra 500mg BID for seizure prophylaxis  # multiple sacral wounds. sacral and buttock , un-stageable- wound care nurse to reevaluate   # functional paraplegia , hx of ICH, has upper ext contracture total care     #Misc  -DVT prophylaxis: Heparin SQ  -GI prophylaxis: Protonix IV BID  -Diet: Tube feed  -Code status: DNR  -Activity: IAT  -Bowel regimen: none: having diarrhea, has dignishield  -Urinary catheter: Recently removed, Pending RBUS  -Dispo: Vent unit- SNF when stable    Pending Nephro for long term HD then DC   62 yo M with  PMH of ICH (2021) s/p trach and PEG, HTN, HLD, T2DM, CAD s/p stents, Recurrent C diff, BIBEMS from [facility] for abnormal labs. Patient non-verbal at baseline - limited history. Per NH chart  have a BUN greater than 200 and creatinine of 4.3 on outpatient labs. Outpatient CXR also w/ new L sided pleural effusion. In ED, patient was hypotensive with Afib with RVR, found to be in acure renal failure, anemic with elevated trops. He was started on Amiodarone GTT, PPI GTT, gastric lavage with coffee ground secretions, started on broad spectrum abx and admitted to MICU  While in MICU, course complicated by DKA, s/p insulin drip, MICHELLE started on HD, acute anemia s/p PRBC transfusion, NSTEMI, downgraded to SDU -> floor    # Acute Renal Failure, started on HD 6/1  # Mild Hyperkalemia  # Fluid Overload- Improving- c/w Bumex and HD  # HAGMA- resolved  - non oliguric, RBUS without hydro   - started on HD 6/1 via [facility]   - Nephro on board- cw HD  - s/p Midodrine- BP better, s/p Sodium Bicarb- acidosis improved  - TTE 6/1 - EF 63%, GIDD   - Fu Nephro regarding the plan for long term HD, then start DC planning to SNF  - cw Lokelma and HD for high K- May need long term standing lokelma    # Diarrhea   GI PCR -ve, C.diff -ve- cont dignishield     # chronic respiratory failure,   vent dependant via trach , trach care    # functional dysphagia,   sp peg, peg care     # Candiduria with U cx C tropicalis  # MDRO UTI and PNA-  s/p Fluconazole  s/p Ceftolozane-tazobactam    # Acute anemia  -gatric lavage negative   resolved s/p Multiple transfusion    # Hyponatremia-resolved  -likely hypervolemic  -c/w bumex 2mg iv q12hr and HD per nephro    # Afib w/ RVR -   # Acute Decompensated CHF  # CAD s/p stents  # NSTEMI type 2  -now rate controlled- c/w Amio and Cardizem  -CHF resolved s/p diuresis  -Seen by cardio 5/31, recommend c/w asa and stating, medical management for NSTEMI    # DKA, resolved  -s/p insulin drip, now on basal/bolus and tube feeds  Monitor FS     # Hx ICH  # multiple sacral wounds  # functional paraplegia ,   Bedbound from NH  has upper ext contracture total care   c/w keppra 500mg BID for seizure prophylaxis  sacral and buttock wounds , un-stageable  wound care nurse to reevaluate       #Misc  -DVT prophylaxis: Heparin SQ  -GI prophylaxis: Protonix IV BID  -Diet: Tube feed  -Code status: DNR  -Activity: IAT  -Dispo: Vent unit- SNF when stable    Pending Nephro for long term HD then DC   62 yo M with  PMH of ICH (2021) s/p trach and PEG, HTN, HLD, T2DM, CAD s/p stents, Recurrent C diff, BIBEMS from [facility] for abnormal labs. Patient non-verbal at baseline - limited history. Per NH chart  have a BUN greater than 200 and creatinine of 4.3 on outpatient labs. Outpatient CXR also w/ new L sided pleural effusion. In ED, patient was hypotensive with Afib with RVR, found to be in acure renal failure, anemic with elevated trops. He was started on Amiodarone GTT, PPI GTT, gastric lavage with coffee ground secretions, started on broad spectrum abx and admitted to MICU  While in MICU, course complicated by DKA, s/p insulin drip, MICHELLE started on HD, acute anemia s/p PRBC transfusion, NSTEMI, downgraded to SDU -> floor    # Acute Renal Failure, started on HD 6/1  # Mild Hyperkalemia  # Fluid Overload- Improving- c/w Bumex and HD  # HAGMA- resolved  - non oliguric, RBUS without hydro   - started on HD 6/1 via [facility]   - Nephro on board- cw HD  - s/p Midodrine- BP better, s/p Sodium Bicarb- acidosis improved  - TTE 6/1 - EF 63%, GIDD   - Fu Nephro regarding the plan for long term HD, then start DC planning to SNF  - cw Lokelma and HD for high K- May need long term standing lokelma    # Diarrhea   GI PCR -ve, C.diff -ve- cont dignishield     # chronic respiratory failure,   vent dependant via trach , trach care    # functional dysphagia,   sp peg, peg care     # Candiduria with U cx C tropicalis  # MDRO UTI and PNA-  s/p Fluconazole  s/p Ceftolozane-tazobactam    # Acute anemia  -gatric lavage negative   resolved s/p Multiple transfusion    # Hyponatremia-resolved  -likely hypervolemic  -c/w bumex 2mg iv q12hr and HD per nephro    # Afib w/ RVR -   # Acute Decompensated CHF  # CAD s/p stents  # NSTEMI type 2  -now rate controlled- c/w Amio and Cardizem  -CHF resolved s/p diuresis  -Seen by cardio 5/31, recommend c/w asa and stating, medical management for NSTEMI    # DKA, resolved  -s/p insulin drip, now on basal/bolus and tube feeds  Monitor FS     # Hx ICH  # multiple sacral wounds  # functional paraplegia ,   Bedbound from NH  has upper ext contracture total care   c/w keppra 500mg BID for seizure prophylaxis  sacral and buttock wounds , un-stageable  Care instructions per wound care:   Clean sacrum , B/L buttock and upper back wound with vashe wound cleanser, pat dry then apply hydrogel with moist vashe guaze dressing  Q 12 hours   Continue skin barrier  to R heel - monitor progression  Pressure  injury  preventive  measures  Skin  and incontinence care         #Misc  -DVT prophylaxis: Heparin SQ  -GI prophylaxis: Protonix IV BID  -Diet: Tube feed  -Code status: DNR  -Activity: IAT  -Dispo: Vent unit- SNF when stable    Pending Nephro for long term HD then DC   62 yo M with  PMH of ICH (2021) s/p trach and PEG, HTN, HLD, T2DM, CAD s/p stents, Recurrent C diff, BIBEMS from [facility] for abnormal labs. Patient non-verbal at baseline - limited history. Per NH chart  have a BUN greater than 200 and creatinine of 4.3 on outpatient labs. Outpatient CXR also w/ new L sided pleural effusion. In ED, patient was hypotensive with Afib with RVR, found to be in acure renal failure, anemic with elevated trops. He was started on Amiodarone GTT, PPI GTT, gastric lavage with coffee ground secretions, started on broad spectrum abx and admitted to MICU  While in MICU, course complicated by DKA, s/p insulin drip, MICHELLE started on HD, acute anemia s/p PRBC transfusion, NSTEMI, downgraded to SDU -> floor    # Acute Renal Failure, started on HD 6/1  # Mild Hyperkalemia  # Fluid Overload- Improving- c/w Bumex and HD  # HAGMA- resolved  - non oliguric, RBUS without hydro   - started on HD 6/1 via [facility]   - Nephro on board- cw HD  - s/p Midodrine- BP better, s/p Sodium Bicarb- acidosis improved  - TTE 6/1 - EF 63%, GIDD   - cw Lokelma and HD for high K- May need long term standing lokelma  - Per nephro, if no improvement in kidney function will need TDC next week   -Bladder US: thick-walled bladder, no hydronephrosis, parenchymal disease      # Diarrhea   GI PCR -ve, C.diff -ve   cont dignishield     # chronic respiratory failure,   vent dependant via trach   trach care    # functional dysphagia,   sp peg   peg care     # Candiduria with U cx C tropicalis  # MDRO UTI and PNA-  s/p Fluconazole  s/p Ceftolozane-tazobactam    # Acute anemia  -gatric lavage negative   resolved s/p Multiple transfusion    # Hyponatremia-resolved  -likely hypervolemic  -c/w bumex 2mg iv q12hr and HD per nephro    # Afib w/ RVR -   # Acute Decompensated CHF  # CAD s/p stents  # NSTEMI type 2  -now rate controlled- c/w Amio and Cardizem  -CHF resolved s/p diuresis  -Seen by cardio 5/31, recommend c/w asa and stating, medical management for NSTEMI    # DKA, resolved  -s/p insulin drip, now on basal/bolus and tube feeds  Monitor FS     # Hx ICH  # multiple sacral wounds  # functional paraplegia ,   Bedbound from NH  has upper ext contracture total care   c/w keppra 500mg BID for seizure prophylaxis  sacral and buttock wounds , un-stageable  Care instructions per wound care:   Clean sacrum , B/L buttock and upper back wound with vashe wound cleanser, pat dry then apply hydrogel with moist vashe guaze dressing  Q 12 hours   Continue skin barrier  to R heel - monitor progression  Pressure  injury  preventive  measures  Skin  and incontinence care         #Misc  -DVT prophylaxis: Heparin SQ  -GI prophylaxis: Protonix IV BID  -Diet: Tube feed  -Code status: DNR  -Activity: IAT  -Dispo: Vent unit- SNF when stable    Pending Nephro for long term HD then DC   64 yo M with  PMH of ICH (2021) s/p trach and PEG, HTN, HLD, T2DM, CAD s/p stents, Recurrent C diff, BIBEMS from [facility] for abnormal labs. Patient non-verbal at baseline - limited history. Per NH chart  have a BUN greater than 200 and creatinine of 4.3 on outpatient labs. Outpatient CXR also w/ new L sided pleural effusion. In ED, patient was hypotensive with Afib with RVR, found to be in acure renal failure, anemic with elevated trops. He was started on Amiodarone GTT, PPI GTT, gastric lavage with coffee ground secretions, started on broad spectrum abx and admitted to MICU  While in MICU, course complicated by DKA, s/p insulin drip, MICHELLE started on HD, acute anemia s/p PRBC transfusion, NSTEMI, downgraded to SDU -> floor    # Acute Renal Failure, started on HD 6/1  # Mild Hyperkalemia  # Fluid Overload- Improving- c/w Bumex and HD  # HAGMA- resolved  - non oliguric, RBUS without hydro   - started on HD 6/1 via [facility]   - Nephro on board- cw HD  - s/p Midodrine- BP better, s/p Sodium Bicarb- acidosis improved  - TTE 6/1 - EF 63%, GIDD   - cw Lokelma and HD for high K- May need long term standing lokelma  - Per nephro, will have TDC placed on Monday  -Bladder US: thick-walled bladder, no hydronephrosis, parenchymal disease      # Diarrhea   GI PCR -ve, C.diff -ve   cont dignishield     # chronic respiratory failure,   vent dependant via trach   trach care    # functional dysphagia,   sp peg   peg care     # Candiduria with U cx C tropicalis  # MDRO UTI and PNA-  s/p Fluconazole  s/p Ceftolozane-tazobactam    # Acute anemia  gatric lavage negative   resolved s/p Multiple transfusion    # Hyponatremia-resolved  -likely hypervolemic  -c/w bumex 2mg iv q12hr and HD per nephro    # Afib w/ RVR -   # Acute Decompensated CHF  # CAD s/p stents  # NSTEMI type 2  -now rate controlled- c/w Amio and Cardizem  -CHF resolved s/p diuresis  -Seen by cardio 5/31, recommend c/w asa and stating, medical management for NSTEMI    # DKA, resolved  -s/p insulin drip, now on basal/bolus and tube feeds  Monitor FS     # Hx ICH  # multiple sacral wounds  # functional paraplegia ,   Bedbound from NH  has upper ext contracture total care   c/w keppra 500mg BID for seizure prophylaxis  sacral and buttock wounds , un-stageable  Care instructions per wound care:   Clean sacrum , B/L buttock and upper back wound with vashe wound cleanser, pat dry then apply hydrogel with moist vashe guaze dressing  Q 12 hours   Continue skin barrier  to R heel - monitor progression  Pressure  injury  preventive  measures  Skin  and incontinence care         #Misc  -DVT prophylaxis: Heparin SQ  -GI prophylaxis: Protonix IV BID  -Diet: Tube feed  -Code status: DNR  -Activity: IAT  -Dispo: Vent unit- SNF when stable    Pending Nephro for long term HD then DC   64 yo M with  PMH of ICH (2021) s/p trach and PEG, HTN, HLD, T2DM, CAD s/p stents, Recurrent C diff, BIBEMS from [facility] for abnormal labs. Patient non-verbal at baseline - limited history. Per NH chart  have a BUN greater than 200 and creatinine of 4.3 on outpatient labs. Outpatient CXR also w/ new L sided pleural effusion. In ED, patient was hypotensive with Afib with RVR, found to be in acure renal failure, anemic with elevated trops. He was started on Amiodarone GTT, PPI GTT, gastric lavage with coffee ground secretions, started on broad spectrum abx and admitted to MICU  While in MICU, course complicated by DKA, s/p insulin drip, MICHELLE started on HD, acute anemia s/p PRBC transfusion, NSTEMI, downgraded to SDU -> floor    # Acute Renal Failure, started on HD 6/1  # Mild Hyperkalemia  # Fluid Overload- Improving- c/w Bumex and HD  # HAGMA- resolved  - non oliguric, RBUS without hydro   - started on HD 6/1 via [facility]   - Nephro on board- cw HD  - s/p Midodrine- BP better, s/p Sodium Bicarb- acidosis improved  - TTE 6/1 - EF 63%, GIDD   - cw Lokelma and HD for high K- May need long term standing lokelma  -K to 6 today - pt going for HD so did not treat   - Per nephro, will have TDC placed on Monday  -Bladder US: thick-walled bladder, no hydronephrosis, parenchymal disease      # Diarrhea   GI PCR -ve, C.diff -ve   cont dignishield     # chronic respiratory failure,   vent dependant via trach   trach care    # functional dysphagia,   sp peg   peg care     # Candiduria with U cx C tropicalis  # MDRO UTI and PNA-  s/p Fluconazole  s/p Ceftolozane-tazobactam    # Acute anemia  gatric lavage negative   resolved s/p Multiple transfusion    # Hyponatremia-resolved  -likely hypervolemic  -c/w bumex 2mg iv q12hr and HD per nephro    # Afib w/ RVR -   # Acute Decompensated CHF  # CAD s/p stents  # NSTEMI type 2  -now rate controlled- c/w Amio and Cardizem  -CHF resolved s/p diuresis  -Seen by cardio 5/31, recommend c/w asa and stating, medical management for NSTEMI    # DKA, resolved  -s/p insulin drip, now on basal/bolus and tube feeds  Monitor FS     # Hx ICH  # multiple sacral wounds  # functional paraplegia ,   Bedbound from NH  has upper ext contracture total care   c/w keppra 500mg BID for seizure prophylaxis  sacral and buttock wounds , un-stageable  Care instructions per wound care:   Clean sacrum , B/L buttock and upper back wound with vashe wound cleanser, pat dry then apply hydrogel with moist vashe guaze dressing  Q 12 hours   Continue skin barrier  to R heel - monitor progression  Pressure  injury  preventive  measures  Skin  and incontinence care         #Misc  -DVT prophylaxis: Heparin SQ  -GI prophylaxis: Protonix IV BID  -Diet: Tube feed  -Code status: DNR  -Activity: IAT  -Dispo: Vent unit- SNF when stable    Pending Nephro for long term HD then DC

## 2023-07-01 NOTE — PROGRESS NOTE ADULT - ASSESSMENT
62 YO F  PMH of ICH (2021) s/p trach and PEG, HTN, HLD, T2DM, CAD s/p stents, Recurrent C diff, BIBEMS from [facility] for abnormal labs.   MICHELLE on CKD 3a - severe azotemia  likely due to GIB / hypotension  Started HD on 6/1/23  Acute anemia d/t UGIB  Troponemia  Lactic acidosis resolved   DKA resolved   Afib   -  last hd Thursday/ hd today cr increasing between HD   - phos 1.8 not on binders   -  continue bumex , document UOP   -h/h noted / blood tx /   -  feed : nepro k noted / lokelma  8 q 8   - will need TDC next week if no improvement  - very poor prognosis   will follow

## 2023-07-01 NOTE — PROGRESS NOTE ADULT - ATTENDING COMMENTS
a/p  62 yo M with  PMH of ICH (2021) s/p trach and PEG, HTN, HLD, T2DM, CAD s/p stents, Recurrent C diff, BIBEMS from [facility] for abnormal labs. Patient non-verbal at baseline - limited history. Per NH chart  have a BUN greater than 200 and creatinine of 4.3 on outpatient labs. Outpatient CXR also w/ new L sided pleural effusion. In ED, patient was hypotensive with Afib with RVR, found to be in acure renal failure, anemic with elevated trops. He was started on Amiodarone GTT, PPI GTT, gastric lavage with coffee ground secretions, started on broad spectrum abx and admitted to MICU  While in MICU, course complicated by DKA, s/p insulin drip, MICHELLE started on HD, acute anemia s/p PRBC transfusion, NSTEMI, downgraded to SDU -> floor    # Acute Renal Failure, started on HD 6/1, dw nephro , ir consult for tesio   creatinine trend  2.7 (07-01-23 @ 10:25)  2.7 (06-29-23 @ 13:32)  2.5 (06-28-23 @ 12:00)  2.0 (06-27-23 @ 06:36)    # diarrhea , neg cdiff and gi pcr, immodium one dose , improved  dw nutrition, pt is not on any stool softners since 6/16    # chronic respiratory failure, vent dependant via trach , trach care, fu pulm   # functional dysphagia, sp peg, peg care   # fever, suspected VAP-improved  #Candiduria with U cx C tropicalis  # Acute anemia  # Afib w/ RVR - now rate controlled # Acute Decompensated CHF  CAD s/p stents  NSTEMI, medical management   # DKA, resolved  # Hx ICH   Bedbound from NH   - c/w keppra 500mg BID for seizure prophylaxis  # multiple sacral wounds. sacral and buttock , un-satgeable   wound care nurse to reevulate     # functional paraplegia , hx of ICH, has upper ext contracture total care     #Progress Note Handoff  Pending :  monitor labs, wound care team consult   Family discussion: resident updated family   Disposition: SNF when stable   pt is very high risk for complications of vent and hospitalization   time spent 50 min.

## 2023-07-01 NOTE — PROGRESS NOTE ADULT - SUBJECTIVE AND OBJECTIVE BOX
Patient is a 63y old  Male who presents with a chief complaint of Abnormal labs (01 Jul 2023 08:18)        Over Night Events:    no acute events, afebrile, synchronous w/ ventilator        PHYSICAL EXAM    ICU Vital Signs Last 24 Hrs  T(C): 36.8 (01 Jul 2023 05:37), Max: 37.5 (30 Jun 2023 13:45)  T(F): 98.2 (01 Jul 2023 05:37), Max: 99.5 (30 Jun 2023 13:45)  HR: 73 (01 Jul 2023 12:00) (73 - 82)  BP: 157/68 (01 Jul 2023 12:00) (106/56 - 157/68)  BP(mean): --  ABP: --  ABP(mean): --  RR: 18 (01 Jul 2023 12:00) (18 - 20)  SpO2: 100% (01 Jul 2023 12:00) (99% - 100%)    O2 Parameters below as of 01 Jul 2023 12:00  Patient On (Oxygen Delivery Method): tracheostomy collar          General: ill looking  HEENT: trach          Lungs: Bilateral rhonchi  Cardiovascular: Regular   Abdomen: Soft, Positive BS  not following commands      06-30-23 @ 07:01  -  07-01-23 @ 07:00  --------------------------------------------------------  IN:    Enteral Tube Flush: 240 mL    Peptamen A.F.: 1500 mL  Total IN: 1740 mL    OUT:  Total OUT: 0 mL    Total NET: 1740 mL          LABS:                            7.7    12.82 )-----------( 512      ( 01 Jul 2023 10:25 )             24.4                                               07-01    132<L>  |  92<L>  |  71<HH>  ----------------------------<  155<H>  6.0<HH>   |  29  |  2.7<H>    Ca    9.1      01 Jul 2023 10:25  Phos  1.9     07-01  Mg     2.2     07-01    TPro  5.9<L>  /  Alb  2.2<L>  /  TBili  0.3  /  DBili  x   /  AST  23  /  ALT  20  /  AlkPhos  206<H>  07-01                                             Urinalysis Basic - ( 01 Jul 2023 10:25 )    Color: x / Appearance: x / SG: x / pH: x  Gluc: 155 mg/dL / Ketone: x  / Bili: x / Urobili: x   Blood: x / Protein: x / Nitrite: x   Leuk Esterase: x / RBC: x / WBC x   Sq Epi: x / Non Sq Epi: x / Bacteria: x                                                  LIVER FUNCTIONS - ( 01 Jul 2023 10:25 )  Alb: 2.2 g/dL / Pro: 5.9 g/dL / ALK PHOS: 206 U/L / ALT: 20 U/L / AST: 23 U/L / GGT: x                                                                                               Mode: AC/ CMV (Assist Control/ Continuous Mandatory Ventilation)  RR (machine): 14  TV (machine): 450  FiO2: 40  PEEP: 8  ITime: 1  MAP: 12  PIP: 33                                          MEDICATIONS  (STANDING):  acetylcysteine 20%  Inhalation 4 milliLiter(s) Inhalation every 6 hours  albuterol/ipratropium for Nebulization 3 milliLiter(s) Nebulizer every 6 hours  aMIOdarone    Tablet 200 milliGRAM(s) Oral daily  aMIOdarone    Tablet   Oral   aspirin  chewable 81 milliGRAM(s) Oral daily  atorvastatin 80 milliGRAM(s) Oral at bedtime  buMETAnide Injectable 2 milliGRAM(s) IV Push every 12 hours  chlorhexidine 0.12% Liquid 15 milliLiter(s) Oral Mucosa two times a day  chlorhexidine 2% Cloths 1 Application(s) Topical <User Schedule>  dextrose 5%. 1000 milliLiter(s) (50 mL/Hr) IV Continuous <Continuous>  dextrose 5%. 1000 milliLiter(s) (100 mL/Hr) IV Continuous <Continuous>  dextrose 50% Injectable 12.5 Gram(s) IV Push once  dextrose 50% Injectable 25 Gram(s) IV Push once  dextrose 50% Injectable 25 Gram(s) IV Push once  diltiazem    milliGRAM(s) Oral daily  glucagon  Injectable 1 milliGRAM(s) IntraMuscular once  heparin   Injectable 5000 Unit(s) SubCutaneous every 12 hours  insulin glargine Injectable (LANTUS) 10 Unit(s) SubCutaneous at bedtime  insulin lispro (ADMELOG) corrective regimen sliding scale   SubCutaneous three times a day before meals  levETIRAcetam  Solution 500 milliGRAM(s) Oral two times a day  pantoprazole  Injectable 40 milliGRAM(s) IV Push every 12 hours  sodium phosphate 30 milliMole(s)/500 mL IVPB 30 milliMole(s) IV Intermittent once    MEDICATIONS  (PRN):  acetaminophen     Tablet .. 650 milliGRAM(s) Oral every 6 hours PRN Temp greater or equal to 38C (100.4F), Mild Pain (1 - 3)  dextrose Oral Gel 15 Gram(s) Oral once PRN Blood Glucose LESS THAN 70 milliGRAM(s)/deciliter  sodium chloride 0.9% Bolus. 100 milliLiter(s) IV Bolus every 5 minutes PRN SBP LESS THAN or EQUAL to 80 mmHg  sodium chloride 0.9% Bolus. 100 milliLiter(s) IV Bolus every 5 minutes PRN SBP LESS THAN or EQUAL to 100 mmHg  sodium chloride 0.9% Bolus. 100 milliLiter(s) IV Bolus every 5 minutes PRN SBP LESS THAN or EQUAL to 100 mmHg

## 2023-07-02 NOTE — PROGRESS NOTE ADULT - SUBJECTIVE AND OBJECTIVE BOX
pt seen and examined.     Resident's notes reviewed, My notes supersede resident's notes in case of discrepancy       ROS: no cp, no sob, no n/v, no fever    Vital Signs Last 24 Hrs  T(C): 37.1 (02 Jul 2023 12:56), Max: 37.6 (02 Jul 2023 04:50)  T(F): 98.7 (02 Jul 2023 12:56), Max: 99.6 (02 Jul 2023 04:50)  HR: 82 (02 Jul 2023 12:56) (70 - 83)  BP: 120/60 (02 Jul 2023 12:56) (117/55 - 136/62)  BP(mean): --  RR: 20 (02 Jul 2023 04:50) (18 - 20)  SpO2: 100% (02 Jul 2023 12:56) (100% - 100%)    Parameters below as of 02 Jul 2023 04:50  Patient On (Oxygen Delivery Method): ventilator    constitutional NAD, blinks, non verbal non communicating , Respiratory trach in place,  lungs CTA, CVS heart RRR, GI: abdomen peg in place, Soft NT, ND, BS+, skin: unstageable sacral and buttock pressure ulcers   neuro exam pt cannot participate, has contracture     MEDICATIONS  (STANDING):  acetylcysteine 20%  Inhalation 4 milliLiter(s) Inhalation every 6 hours  albuterol/ipratropium for Nebulization 3 milliLiter(s) Nebulizer every 6 hours  aMIOdarone    Tablet 200 milliGRAM(s) Oral daily  aMIOdarone    Tablet   Oral   aspirin  chewable 81 milliGRAM(s) Oral daily  atorvastatin 80 milliGRAM(s) Oral at bedtime  buMETAnide Injectable 2 milliGRAM(s) IV Push every 12 hours  chlorhexidine 0.12% Liquid 15 milliLiter(s) Oral Mucosa two times a day  chlorhexidine 2% Cloths 1 Application(s) Topical <User Schedule>  dextrose 5%. 1000 milliLiter(s) (100 mL/Hr) IV Continuous <Continuous>  dextrose 5%. 1000 milliLiter(s) (50 mL/Hr) IV Continuous <Continuous>  dextrose 50% Injectable 25 Gram(s) IV Push once  dextrose 50% Injectable 25 Gram(s) IV Push once  dextrose 50% Injectable 12.5 Gram(s) IV Push once  diltiazem    milliGRAM(s) Oral daily  glucagon  Injectable 1 milliGRAM(s) IntraMuscular once  heparin   Injectable 5000 Unit(s) SubCutaneous every 12 hours  insulin glargine Injectable (LANTUS) 10 Unit(s) SubCutaneous at bedtime  insulin lispro (ADMELOG) corrective regimen sliding scale   SubCutaneous three times a day before meals  levETIRAcetam  Solution 500 milliGRAM(s) Oral two times a day  pantoprazole  Injectable 40 milliGRAM(s) IV Push every 12 hours  sodium phosphate 30 milliMole(s)/500 mL IVPB 30 milliMole(s) IV Intermittent once    MEDICATIONS  (PRN):  acetaminophen     Tablet .. 650 milliGRAM(s) Oral every 6 hours PRN Temp greater or equal to 38C (100.4F), Mild Pain (1 - 3)  dextrose Oral Gel 15 Gram(s) Oral once PRN Blood Glucose LESS THAN 70 milliGRAM(s)/deciliter  sodium chloride 0.9% Bolus. 100 milliLiter(s) IV Bolus every 5 minutes PRN SBP LESS THAN or EQUAL to 100 mmHg  sodium chloride 0.9% Bolus. 100 milliLiter(s) IV Bolus every 5 minutes PRN SBP LESS THAN or EQUAL to 100 mmHg  sodium chloride 0.9% Bolus. 100 milliLiter(s) IV Bolus every 5 minutes PRN SBP LESS THAN or EQUAL to 80 mmHg                            7.7    12.82 )-----------( 512      ( 01 Jul 2023 10:25 )             24.4     07-01    132<L>  |  92<L>  |  71<HH>  ----------------------------<  155<H>  6.0<HH>   |  29  |  2.7<H>    Ca    9.1      01 Jul 2023 10:25  Phos  1.9     07-01  Mg     2.2     07-01    TPro  5.9<L>  /  Alb  2.2<L>  /  TBili  0.3  /  DBili  x   /  AST  23  /  ALT  20  /  AlkPhos  206<H>  07-01    Procalcitonin, Serum: 1.38 ng/mL [0.02 - 0.10] (06-12-23 @ 09:44)  Procalcitonin, Serum: 1.12 ng/mL [0.02 - 0.10] (06-11-23 @ 21:02)  Ferritin, Serum: 1956 ng/mL [30 - 400] (06-06-23 @ 10:40)  Procalcitonin, Serum: 2.38 ng/mL [0.02 - 0.10] (05-31-23 @ 05:50)    a/p  62 yo M with  PMH of ICH (2021) s/p trach and PEG, HTN, HLD, T2DM, CAD s/p stents, Recurrent C diff, BIBEMS from [facility] for abnormal labs. Patient non-verbal at baseline - limited history. Per NH chart  have a BUN greater than 200 and creatinine of 4.3 on outpatient labs. Outpatient CXR also w/ new L sided pleural effusion. In ED, patient was hypotensive with Afib with RVR, found to be in acure renal failure, anemic with elevated trops. He was started on Amiodarone GTT, PPI GTT, gastric lavage with coffee ground secretions, started on broad spectrum abx and admitted to MICU  While in MICU, course complicated by DKA, s/p insulin drip, MICHELLE started on HD, acute anemia s/p PRBC transfusion, NSTEMI, downgraded to SDU -> floor    # Acute Renal Failure, started on HD 6/1, dw nephro , ir consult for tesio   creatinine trend  2.7 (07-01-23 @ 10:25)  2.7 (06-29-23 @ 13:32)  2.5 (06-28-23 @ 12:00)    # diarrhea , neg cdiff and gi pcr, immodium one dose , improved  dw nutrition, pt is not on any stool softners since 6/16    # chronic respiratory failure, vent dependant via trach , trach care, fu pulm   # functional dysphagia, sp peg, peg care   # fever, suspected VAP-improved  #Candiduria with U cx C tropicalis  # Acute anemia  # Afib w/ RVR - now rate controlled # Acute Decompensated CHF  CAD s/p stents  NSTEMI, medical management   # DKA, resolved  # Hx ICH   Bedbound from NH   - c/w keppra 500mg BID for seizure prophylaxis  # multiple sacral wounds. sacral and buttock , un-satgeable   wound care nurse to reevulate     # functional paraplegia , hx of ICH, has upper ext contracture total care     #Progress Note Handoff  Pending :  [place] this upcoming week, IR consult pending   Family discussion: resident updated family   Disposition: SNF when stable   pt is very high risk for complications of vent and hospitalization   time spent 50 min.

## 2023-07-02 NOTE — PROGRESS NOTE ADULT - SUBJECTIVE AND OBJECTIVE BOX
Nephrology progress note    Patient was seen and examined, events over the last 24 h noted .  HD yesterday    Allergies:  penicillin (Other)    Hospital Medications:   MEDICATIONS  (STANDING):  acetylcysteine 20%  Inhalation 4 milliLiter(s) Inhalation every 6 hours  albuterol/ipratropium for Nebulization 3 milliLiter(s) Nebulizer every 6 hours  aMIOdarone    Tablet   Oral   aMIOdarone    Tablet 200 milliGRAM(s) Oral daily  aspirin  chewable 81 milliGRAM(s) Oral daily  atorvastatin 80 milliGRAM(s) Oral at bedtime  buMETAnide Injectable 2 milliGRAM(s) IV Push every 12 hours  chlorhexidine 0.12% Liquid 15 milliLiter(s) Oral Mucosa two times a day  chlorhexidine 2% Cloths 1 Application(s) Topical <User Schedule>  dextrose 5%. 1000 milliLiter(s) (50 mL/Hr) IV Continuous <Continuous>  dextrose 5%. 1000 milliLiter(s) (100 mL/Hr) IV Continuous <Continuous>  dextrose 50% Injectable 25 Gram(s) IV Push once  dextrose 50% Injectable 25 Gram(s) IV Push once  dextrose 50% Injectable 12.5 Gram(s) IV Push once  diltiazem    milliGRAM(s) Oral daily  glucagon  Injectable 1 milliGRAM(s) IntraMuscular once  heparin   Injectable 5000 Unit(s) SubCutaneous every 12 hours  insulin glargine Injectable (LANTUS) 10 Unit(s) SubCutaneous at bedtime  insulin lispro (ADMELOG) corrective regimen sliding scale   SubCutaneous three times a day before meals  levETIRAcetam  Solution 500 milliGRAM(s) Oral two times a day  pantoprazole  Injectable 40 milliGRAM(s) IV Push every 12 hours  sodium phosphate 30 milliMole(s)/500 mL IVPB 30 milliMole(s) IV Intermittent once        VITALS:  T(F): 99.6 (07-02-23 @ 04:50), Max: 99.6 (07-02-23 @ 04:50)  HR: 76 (07-02-23 @ 09:00)  BP: 117/55 (07-02-23 @ 04:50)  RR: 20 (07-02-23 @ 04:50)  SpO2: 100% (07-02-23 @ 09:00)  Wt(kg): --    06-30 @ 07:01  -  07-01 @ 07:00  --------------------------------------------------------  IN: 1740 mL / OUT: 0 mL / NET: 1740 mL    07-01 @ 07:01  -  07-02 @ 07:00  --------------------------------------------------------  IN: 0 mL / OUT: 1000 mL / NET: -1000 mL          PHYSICAL EXAM:  	Gen: trach/vent  	Pulm:  B/L aline  	CV:  S1S2; no rub  	Abd: distended  	LE: edema  	Vascular access: udall  LABS:  07-01    132<L>  |  92<L>  |  71<HH>  ----------------------------<  155<H>  6.0<HH>   |  29  |  2.7<H>    Ca    9.1      01 Jul 2023 10:25  Phos  1.9     07-01  Mg     2.2     07-01    TPro  5.9<L>  /  Alb  2.2<L>  /  TBili  0.3  /  DBili      /  AST  23  /  ALT  20  /  AlkPhos  206<H>  07-01                          7.7    12.82 )-----------( 512      ( 01 Jul 2023 10:25 )             24.4       Urine Studies:  Urinalysis Basic - ( 01 Jul 2023 10:25 )    Color:  / Appearance:  / SG:  / pH:   Gluc: 155 mg/dL / Ketone:   / Bili:  / Urobili:    Blood:  / Protein:  / Nitrite:    Leuk Esterase:  / RBC:  / WBC    Sq Epi:  / Non Sq Epi:  / Bacteria:         RADIOLOGY & ADDITIONAL STUDIES:

## 2023-07-02 NOTE — PROGRESS NOTE ADULT - SUBJECTIVE AND OBJECTIVE BOX
Patient is a 63y old  Male who presents with a chief complaint of Abnormal labs (02 Jul 2023 11:57)        Over Night Events:      no acute events, afebrile, synchronous w/ ventilator          PHYSICAL EXAM    ICU Vital Signs Last 24 Hrs  T(C): 37.6 (02 Jul 2023 04:50), Max: 37.6 (02 Jul 2023 04:50)  T(F): 99.6 (02 Jul 2023 04:50), Max: 99.6 (02 Jul 2023 04:50)  HR: 76 (02 Jul 2023 09:00) (70 - 83)  BP: 117/55 (02 Jul 2023 04:50) (117/55 - 136/62)  BP(mean): --  ABP: --  ABP(mean): --  RR: 20 (02 Jul 2023 04:50) (18 - 20)  SpO2: 100% (02 Jul 2023 09:00) (100% - 100%)    O2 Parameters below as of 02 Jul 2023 04:50  Patient On (Oxygen Delivery Method): ventilator        General: ill looking  HEENT: trach          Lungs: Bilateral rhonchi  Cardiovascular: Regular   Abdomen: Soft, Positive BS  not following commands        07-01-23 @ 07:01  -  07-02-23 @ 07:00  --------------------------------------------------------  IN:  Total IN: 0 mL    OUT:    Other (mL): 1000 mL  Total OUT: 1000 mL    Total NET: -1000 mL      07-02-23 @ 07:01  -  07-02-23 @ 12:08  --------------------------------------------------------  IN:    Enteral Tube Flush: 60 mL    Peptamen A.F.: 375 mL  Total IN: 435 mL    OUT:  Total OUT: 0 mL    Total NET: 435 mL          LABS:                            7.7    12.82 )-----------( 512      ( 01 Jul 2023 10:25 )             24.4                                               07-01    132<L>  |  92<L>  |  71<HH>  ----------------------------<  155<H>  6.0<HH>   |  29  |  2.7<H>    Ca    9.1      01 Jul 2023 10:25  Phos  1.9     07-01  Mg     2.2     07-01    TPro  5.9<L>  /  Alb  2.2<L>  /  TBili  0.3  /  DBili  x   /  AST  23  /  ALT  20  /  AlkPhos  206<H>  07-01                                             Urinalysis Basic - ( 01 Jul 2023 10:25 )    Color: x / Appearance: x / SG: x / pH: x  Gluc: 155 mg/dL / Ketone: x  / Bili: x / Urobili: x   Blood: x / Protein: x / Nitrite: x   Leuk Esterase: x / RBC: x / WBC x   Sq Epi: x / Non Sq Epi: x / Bacteria: x                                                  LIVER FUNCTIONS - ( 01 Jul 2023 10:25 )  Alb: 2.2 g/dL / Pro: 5.9 g/dL / ALK PHOS: 206 U/L / ALT: 20 U/L / AST: 23 U/L / GGT: x                                                                                               Mode: AC/ CMV (Assist Control/ Continuous Mandatory Ventilation)  RR (machine): 14  TV (machine): 450  FiO2: 40  PEEP: 8  ITime: 0.8  MAP: 12  PIP: 22                                          MEDICATIONS  (STANDING):  acetylcysteine 20%  Inhalation 4 milliLiter(s) Inhalation every 6 hours  albuterol/ipratropium for Nebulization 3 milliLiter(s) Nebulizer every 6 hours  aMIOdarone    Tablet   Oral   aMIOdarone    Tablet 200 milliGRAM(s) Oral daily  aspirin  chewable 81 milliGRAM(s) Oral daily  atorvastatin 80 milliGRAM(s) Oral at bedtime  buMETAnide Injectable 2 milliGRAM(s) IV Push every 12 hours  chlorhexidine 0.12% Liquid 15 milliLiter(s) Oral Mucosa two times a day  chlorhexidine 2% Cloths 1 Application(s) Topical <User Schedule>  dextrose 5%. 1000 milliLiter(s) (50 mL/Hr) IV Continuous <Continuous>  dextrose 5%. 1000 milliLiter(s) (100 mL/Hr) IV Continuous <Continuous>  dextrose 50% Injectable 25 Gram(s) IV Push once  dextrose 50% Injectable 25 Gram(s) IV Push once  dextrose 50% Injectable 12.5 Gram(s) IV Push once  diltiazem    milliGRAM(s) Oral daily  glucagon  Injectable 1 milliGRAM(s) IntraMuscular once  heparin   Injectable 5000 Unit(s) SubCutaneous every 12 hours  insulin glargine Injectable (LANTUS) 10 Unit(s) SubCutaneous at bedtime  insulin lispro (ADMELOG) corrective regimen sliding scale   SubCutaneous three times a day before meals  levETIRAcetam  Solution 500 milliGRAM(s) Oral two times a day  pantoprazole  Injectable 40 milliGRAM(s) IV Push every 12 hours  sodium phosphate 30 milliMole(s)/500 mL IVPB 30 milliMole(s) IV Intermittent once    MEDICATIONS  (PRN):  acetaminophen     Tablet .. 650 milliGRAM(s) Oral every 6 hours PRN Temp greater or equal to 38C (100.4F), Mild Pain (1 - 3)  dextrose Oral Gel 15 Gram(s) Oral once PRN Blood Glucose LESS THAN 70 milliGRAM(s)/deciliter  sodium chloride 0.9% Bolus. 100 milliLiter(s) IV Bolus every 5 minutes PRN SBP LESS THAN or EQUAL to 100 mmHg  sodium chloride 0.9% Bolus. 100 milliLiter(s) IV Bolus every 5 minutes PRN SBP LESS THAN or EQUAL to 100 mmHg  sodium chloride 0.9% Bolus. 100 milliLiter(s) IV Bolus every 5 minutes PRN SBP LESS THAN or EQUAL to 80 mmHg

## 2023-07-02 NOTE — PROGRESS NOTE ADULT - ASSESSMENT
64 YO F  PMH of ICH (2021) s/p trach and PEG, HTN, HLD, T2DM, CAD s/p stents, Recurrent C diff, BIBEMS from [facility] for abnormal labs.   MICHELLE on CKD 3a - severe azotemia  likely due to GIB / hypotension  Started HD on 6/1/23  Acute anemia d/t UGIB  Troponemia  Lactic acidosis resolved   DKA resolved   Afib   -  last hd HD yesterday for rising Cr, hyperK  - phos 1.8 not on binders   -  continue bumex , document UOP   -h/h noted / blood tx /   -  feed : nepro k noted / lokelma  8 q 8   - will need TDC this week  - very poor prognosis   will follow

## 2023-07-03 NOTE — PROGRESS NOTE ADULT - ASSESSMENT
64 YO F  PMH of ICH (2021) s/p trach and PEG, HTN, HLD, T2DM, CAD s/p stents, Recurrent C diff, BIBEMS from [facility] for abnormal labs.   MICHELLE on CKD 3a - severe azotemia  likely due to GIB / hypotension  Started HD on 6/1/23  Acute anemia d/t UGIB  Troponemia  Lactic acidosis resolved   DKA resolved   Afib   -  for HD in AM 3h opti 160 2k bath   - phos 1.8 not on binders / repeat   -  continue bumex , document UOP   -h/h noted / blood tx /   -  feed : nepro k noted / lokelma  10 g  q 8   - will need TDC this week  - very poor prognosis   will follow

## 2023-07-03 NOTE — PROGRESS NOTE ADULT - SUBJECTIVE AND OBJECTIVE BOX
---------Daily Progress Note-------    History of Present Illness:   64 yo M with  PMH of ICH (2021) s/p trach and PEG, HTN, HLD, T2DM, CAD s/p stents, Recurrent C diff, BIBEMS from [facility] for abnormal labs. Patient non-verbal at baseline - limited history. Per NH chart have a BUN greater than 200 and creatinine of 4.3 on outpatient labs. Outpatient CXR also w/ new L sided pleural effusion. With EMS patient was also found to be in irregular rhythm, no known history of A-fib or a flutter.  In ED patient had borderline BP, and was in Atrial fibrillation. He was started on amiodarone gtt. Labs showed , Cr. 4.1, Hb 6.6. Trops 1.8. He was given 1U PRBC, gastric lavage revealed coffee ground emesis w/no active bleeding. He was started on PPI drip and GI consulted in ED. He was given IV aztreonam and vancomycin.   Patient admitted to ICU for management of Sepsis from UTI, MICHELLE likely prerenal from anemia and diarrhea and NSTEMI. TTE with EF of 63%, G1DD. NSTEMI stable, cardiology recommended medical management with aspirin and statin. Found to have Candiduria s/p fluconzaole and ceftolozane-tazobactam, DKA s/p insulin drip (currently on glargine 10units at bedtime with FS in 130s-170s), acute rental failure with urine production s/p R IJ U-dall placed on 6/1, hyperkalemia requiring HD and Lokelma, acute anemia requiring multiple transfusions (most recent 6/28, Hgb 6.5->7.8). Nephrology continuing to follow, patient to receive TDC next week.    24H events:    Patient is a 63y old Male who presents with a chief complaint of Abnormal labs (01 Jul 2023 07:55)    Primary diagnosis of MICHELLE (acute kidney injury)      Today is hospital day 34d. This morning patient was seen and examined at bedside, resting comfortably in bed.    No acute or major events overnight.    Code Status: DNR    Family Communication: Called wife, updated on likely TDC placement next week     PAST MEDICAL & SURGICAL HISTORY  HTN (hypertension)    Diabetes mellitus    H/O intracranial hemorrhage    H/O tracheostomy    PEG (percutaneous endoscopic gastrostomy) status    Hyperlipidemia      SOCIAL HISTORY:  Social History:      ALLERGIES:  penicillin (Other)    MEDICATIONS:  MEDICATIONS  (STANDING):  acetylcysteine 20%  Inhalation 4 milliLiter(s) Inhalation every 6 hours  albuterol/ipratropium for Nebulization 3 milliLiter(s) Nebulizer every 6 hours  aMIOdarone    Tablet   Oral   aMIOdarone    Tablet 200 milliGRAM(s) Oral daily  aspirin  chewable 81 milliGRAM(s) Oral daily  atorvastatin 80 milliGRAM(s) Oral at bedtime  buMETAnide Injectable 2 milliGRAM(s) IV Push every 12 hours  chlorhexidine 0.12% Liquid 15 milliLiter(s) Oral Mucosa two times a day  chlorhexidine 2% Cloths 1 Application(s) Topical <User Schedule>  dextrose 5%. 1000 milliLiter(s) (50 mL/Hr) IV Continuous <Continuous>  dextrose 5%. 1000 milliLiter(s) (100 mL/Hr) IV Continuous <Continuous>  dextrose 50% Injectable 12.5 Gram(s) IV Push once  dextrose 50% Injectable 25 Gram(s) IV Push once  dextrose 50% Injectable 25 Gram(s) IV Push once  diltiazem    milliGRAM(s) Oral daily  glucagon  Injectable 1 milliGRAM(s) IntraMuscular once  heparin   Injectable 5000 Unit(s) SubCutaneous every 12 hours  insulin glargine Injectable (LANTUS) 10 Unit(s) SubCutaneous at bedtime  insulin lispro (ADMELOG) corrective regimen sliding scale   SubCutaneous three times a day before meals  levETIRAcetam  Solution 500 milliGRAM(s) Oral two times a day  pantoprazole  Injectable 40 milliGRAM(s) IV Push every 12 hours  sodium phosphate 30 milliMole(s)/500 mL IVPB 30 milliMole(s) IV Intermittent once  sodium zirconium cyclosilicate 10 Gram(s) Oral once    MEDICATIONS  (PRN):  acetaminophen     Tablet .. 650 milliGRAM(s) Oral every 6 hours PRN Temp greater or equal to 38C (100.4F), Mild Pain (1 - 3)  dextrose Oral Gel 15 Gram(s) Oral once PRN Blood Glucose LESS THAN 70 milliGRAM(s)/deciliter  sodium chloride 0.9% Bolus. 100 milliLiter(s) IV Bolus every 5 minutes PRN SBP LESS THAN or EQUAL to 100 mmHg  sodium chloride 0.9% Bolus. 100 milliLiter(s) IV Bolus every 5 minutes PRN SBP LESS THAN or EQUAL to 100 mmHg  sodium chloride 0.9% Bolus. 100 milliLiter(s) IV Bolus every 5 minutes PRN SBP LESS THAN or EQUAL to 80 mmHg      VITALS:   Vital Signs Last 24 Hrs  T(C): 36.7 (03 Jul 2023 13:00), Max: 37.7 (03 Jul 2023 04:25)  T(F): 98 (03 Jul 2023 13:00), Max: 99.9 (03 Jul 2023 04:25)  HR: 84 (03 Jul 2023 13:00) (68 - 85)  BP: 99/58 (03 Jul 2023 13:00) (99/58 - 124/58)  BP(mean): --  RR: 20 (03 Jul 2023 04:25) (20 - 20)  SpO2: 100% (03 Jul 2023 13:00) (100% - 100%)    Parameters below as of 03 Jul 2023 13:00  Patient On (Oxygen Delivery Method): ventilator        PHYSICAL EXAM:  GENERAL: NAD, lying in bed   HEENT: Atraumatic, neck trach in place  LUNGS: B/L air entry, no adventitious breath sounds   HEART: Regular rate and rhythm. Normal s1s2.    ABD: Soft, non-tender, non-distended. Bowel sounds present. Peg in place.   EXT: B/L LE pitting edema   NEURO: AAOX0      LABS:               LABS:                          7.7    11.17 )-----------( 446      ( 03 Jul 2023 12:22 )             24.3     07-03    133<L>  |  95<L>  |  65<HH>  ----------------------------<  120<H>  5.7<H>   |  28  |  2.5<H>    Ca    8.9      03 Jul 2023 12:22  Mg     2.2     07-03    TPro  5.3<L>  /  Alb  2.1<L>  /  TBili  0.3  /  DBili  x   /  AST  20  /  ALT  17  /  AlkPhos  171<H>  07-03    LIVER FUNCTIONS - ( 03 Jul 2023 12:22 )  Alb: 2.1 g/dL / Pro: 5.3 g/dL / ALK PHOS: 171 U/L / ALT: 17 U/L / AST: 20 U/L / GGT: x             Urinalysis Basic - ( 03 Jul 2023 12:22 )    Color: x / Appearance: x / SG: x / pH: x  Gluc: 120 mg/dL / Ketone: x  / Bili: x / Urobili: x   Blood: x / Protein: x / Nitrite: x   Leuk Esterase: x / RBC: x / WBC x   Sq Epi: x / Non Sq Epi: x / Bacteria: x                      RADIOLOGY:  < from: US Kidney and Bladder (06.30.23 @ 18:37) >  ACC: [id number] EXAM:  US KIDNEYS AND BLADDER   ORDERED BY: JAI BEHGAL     PROCEDURE DATE:  06/30/2023          INTERPRETATION:  CLINICAL INFORMATION: Acute kidney injury    COMPARISON: Sonogram dated 5/31/2023    TECHNIQUE: Sonography of the kidneysand bladder.    FINDINGS:    Right kidney: 12.6 cm Echogenic.. No hydronephrosis.    Left kidney:  11.7 cm. Echogenic. No hydronephrosis. 3.0 cm cyst.    Urinary bladder: Patient voided prior to exam. Thick-walled bladder with   volume of 130 cc.    IMPRESSION:    Echogenic kidneys compatible with parenchymal disease.    No hydronephrosis.    Thick-walled bladder. Correlate with urinalysis.    --- End of Report ---    < end of copied text >

## 2023-07-03 NOTE — PROGRESS NOTE ADULT - SUBJECTIVE AND OBJECTIVE BOX
Nephrology progress note    THIS IS AN INCOMPLETE NOTE . FULL NOTE TO FOLLOW SHORTLY    Patient is seen and examined, events over the last 24 h noted .    Allergies:  penicillin (Other)    Hospital Medications:   MEDICATIONS  (STANDING):  acetylcysteine 20%  Inhalation 4 milliLiter(s) Inhalation every 6 hours  albuterol/ipratropium for Nebulization 3 milliLiter(s) Nebulizer every 6 hours  aMIOdarone    Tablet   Oral   aMIOdarone    Tablet 200 milliGRAM(s) Oral daily  aspirin  chewable 81 milliGRAM(s) Oral daily  atorvastatin 80 milliGRAM(s) Oral at bedtime  buMETAnide Injectable 2 milliGRAM(s) IV Push every 12 hours  chlorhexidine 0.12% Liquid 15 milliLiter(s) Oral Mucosa two times a day  chlorhexidine 2% Cloths 1 Application(s) Topical <User Schedule>  dextrose 5%. 1000 milliLiter(s) (100 mL/Hr) IV Continuous <Continuous>  dextrose 5%. 1000 milliLiter(s) (50 mL/Hr) IV Continuous <Continuous>  dextrose 50% Injectable 25 Gram(s) IV Push once  dextrose 50% Injectable 25 Gram(s) IV Push once  dextrose 50% Injectable 12.5 Gram(s) IV Push once  diltiazem    milliGRAM(s) Oral daily  glucagon  Injectable 1 milliGRAM(s) IntraMuscular once  heparin   Injectable 5000 Unit(s) SubCutaneous every 12 hours  insulin glargine Injectable (LANTUS) 10 Unit(s) SubCutaneous at bedtime  insulin lispro (ADMELOG) corrective regimen sliding scale   SubCutaneous three times a day before meals  levETIRAcetam  Solution 500 milliGRAM(s) Oral two times a day  pantoprazole  Injectable 40 milliGRAM(s) IV Push every 12 hours  sodium phosphate 30 milliMole(s)/500 mL IVPB 30 milliMole(s) IV Intermittent once        VITALS:  T(F): 99.9 (07-03-23 @ 04:25), Max: 99.9 (07-03-23 @ 04:25)  HR: 83 (07-03-23 @ 04:30)  BP: 122/65 (07-03-23 @ 04:25)  RR: 20 (07-03-23 @ 04:25)  SpO2: 100% (07-03-23 @ 04:30)  Wt(kg): --    07-01 @ 07:01 - 07-02 @ 07:00  --------------------------------------------------------  IN: 0 mL / OUT: 1000 mL / NET: -1000 mL    07-02 @ 07:01 - 07-03 @ 07:00  --------------------------------------------------------  IN: 1305 mL / OUT: 0 mL / NET: 1305 mL          PHYSICAL EXAM:  Constitutional: NAD  HEENT: anicteric sclera, oropharynx clear, MMM  Neck: No JVD  Respiratory: CTAB, no wheezes, rales or rhonchi  Cardiovascular: S1, S2, RRR  Gastrointestinal: BS+, soft, NT/ND  Extremities: No cyanosis or clubbing. No peripheral edema  :  No teixeira.   Skin: No rashes    LABS:  07-01    132<L>  |  92<L>  |  71<HH>  ----------------------------<  155<H>  6.0<HH>   |  29  |  2.7<H>    Ca    9.1      01 Jul 2023 10:25  Phos  1.9     07-01  Mg     2.2     07-01    TPro  5.9<L>  /  Alb  2.2<L>  /  TBili  0.3  /  DBili      /  AST  23  /  ALT  20  /  AlkPhos  206<H>  07-01                          7.7    12.82 )-----------( 512      ( 01 Jul 2023 10:25 )             24.4       Urine Studies:  Urinalysis Basic - ( 01 Jul 2023 10:25 )    Color:  / Appearance:  / SG:  / pH:   Gluc: 155 mg/dL / Ketone:   / Bili:  / Urobili:    Blood:  / Protein:  / Nitrite:    Leuk Esterase:  / RBC:  / WBC    Sq Epi:  / Non Sq Epi:  / Bacteria:           Iron 51, TIBC 111, %sat 46      [06-06-23 @ 10:40]  Ferritin 1956      [06-06-23 @ 10:40]  Vitamin D (25OH) 70      [06-03-23 @ 06:50]    HBsAb <3.0      [06-01-23 @ 22:50]  HBsAb Nonreact      [06-01-23 @ 22:50]  HBsAg Nonreact      [06-01-23 @ 22:50]  HBcAb Nonreact      [06-01-23 @ 22:50]  HCV 0.09, Nonreact      [11-05-21 @ 09:04]  HIV Nonreact      [06-08-23 @ 16:00]  HIV Nonreact      [06-08-23 @ 12:20]        RADIOLOGY & ADDITIONAL STUDIES:   Nephrology progress note    Patient is seen and examined, events over the last 24 h noted .  Lying in bed   on MV     Allergies:  penicillin (Other)    Hospital Medications:   MEDICATIONS  (STANDING):  acetylcysteine 20%  Inhalation 4 milliLiter(s) Inhalation every 6 hours  albuterol/ipratropium for Nebulization 3 milliLiter(s) Nebulizer every 6 hours  aMIOdarone    Tablet 200 milliGRAM(s) Oral daily  aspirin  chewable 81 milliGRAM(s) Oral daily  atorvastatin 80 milliGRAM(s) Oral at bedtime  buMETAnide Injectable 2 milliGRAM(s) IV Push every 12 hours  diltiazem    milliGRAM(s) Oral daily  glucagon  Injectable 1 milliGRAM(s) IntraMuscular once  heparin   Injectable 5000 Unit(s) SubCutaneous every 12 hours  insulin glargine Injectable (LANTUS) 10 Unit(s) SubCutaneous at bedtime  insulin lispro (ADMELOG) corrective regimen sliding scale   SubCutaneous three times a day before meals  levETIRAcetam  Solution 500 milliGRAM(s) Oral two times a day  pantoprazole  Injectable 40 milliGRAM(s) IV Push every 12 hours  sodium phosphate 30 milliMole(s)/500 mL IVPB 30 milliMole(s) IV Intermittent once        VITALS:  T(F): 99.9 (07-03-23 @ 04:25), Max: 99.9 (07-03-23 @ 04:25)  HR: 83 (07-03-23 @ 04:30)  BP: 122/65 (07-03-23 @ 04:25)  RR: 20 (07-03-23 @ 04:25)  SpO2: 100% (07-03-23 @ 04:30)      07-01 @ 07:01  -  07-02 @ 07:00  --------------------------------------------------------  IN: 0 mL / OUT: 1000 mL / NET: -1000 mL    07-02 @ 07:01  -  07-03 @ 07:00  --------------------------------------------------------  IN: 1305 mL / OUT: 0 mL / NET: 1305 mL          PHYSICAL EXAM:  Constitutional: NAD  Respiratory: CTAB,  Cardiovascular: S1, S2, RRR  Gastrointestinal: BS+, soft, NT/ND  Extremities: No cyanosis or clubbing. No peripheral edema  :  No teixeira.   Skin: No rashes    LABS:  07-03    133<L>  |  95<L>  |  65<HH>  ----------------------------<  120<H>  5.7<H>   |  28  |  2.5<H>    Ca    8.9      03 Jul 2023 12:22  Mg     2.2     07-03    TPro  5.3<L>  /  Alb  2.1<L>  /  TBili  0.3  /  DBili  x   /  AST  20  /  ALT  17  /  AlkPhos  171<H>  07-03 07-01    132<L>  |  92<L>  |  71<HH>  ----------------------------<  155<H>  6.0<HH>   |  29  |  2.7<H>    Ca    9.1      01 Jul 2023 10:25  Phos  1.9     07-01  Mg     2.2     07-01    TPro  5.9<L>  /  Alb  2.2<L>  /  TBili  0.3  /  DBili      /  AST  23  /  ALT  20  /  AlkPhos  206<H>  07-01                          7.7    12.82 )-----------( 512      ( 01 Jul 2023 10:25 )             24.4       Urine Studies:  Urinalysis Basic - ( 01 Jul 2023 10:25 )    Color:  / Appearance:  / SG:  / pH:   Gluc: 155 mg/dL / Ketone:   / Bili:  / Urobili:    Blood:  / Protein:  / Nitrite:    Leuk Esterase:  / RBC:  / WBC    Sq Epi:  / Non Sq Epi:  / Bacteria:           Iron 51, TIBC 111, %sat 46      [06-06-23 @ 10:40]  Ferritin 1956      [06-06-23 @ 10:40]  Vitamin D (25OH) 70      [06-03-23 @ 06:50]    HBsAb <3.0      [06-01-23 @ 22:50]  HBsAb Nonreact      [06-01-23 @ 22:50]  HBsAg Nonreact      [06-01-23 @ 22:50]  HBcAb Nonreact      [06-01-23 @ 22:50]  HCV 0.09, Nonreact      [11-05-21 @ 09:04]  HIV Nonreact      [06-08-23 @ 16:00]  HIV Nonreact      [06-08-23 @ 12:20]        RADIOLOGY & ADDITIONAL STUDIES:

## 2023-07-03 NOTE — PROGRESS NOTE ADULT - ASSESSMENT
62 yo M with  PMH of ICH (2021) s/p trach and PEG, HTN, HLD, T2DM, CAD s/p stents, Recurrent C diff, BIBEMS from [facility] for abnormal labs. Patient non-verbal at baseline - limited history. Per NH chart  have a BUN greater than 200 and creatinine of 4.3 on outpatient labs. Outpatient CXR also w/ new L sided pleural effusion. In ED, patient was hypotensive with Afib with RVR, found to be in acure renal failure, anemic with elevated trops. He was started on Amiodarone GTT, PPI GTT, gastric lavage with coffee ground secretions, started on broad spectrum abx and admitted to MICU  While in MICU, course complicated by DKA, s/p insulin drip, MICHELLE started on HD, acute anemia s/p PRBC transfusion, NSTEMI, downgraded to SDU -> floor    # Acute Renal Failure, started on HD 6/1  # Mild Hyperkalemia  # Fluid Overload- Improving- c/w Bumex and HD  # HAGMA- resolved  - non oliguric, RBUS without hydro   - started on HD 6/1 via R [facility]   - Nephro on board- cw HD  - s/p Midodrine- BP better, s/p Sodium Bicarb- acidosis improved  - TTE 6/1 - EF 63%, GIDD   - cw Lokelma and HD for high K- May need long term standing lokelma  -Bladder US: thick-walled bladder, no hydronephrosis, parenchymal disease      # Diarrhea   GI PCR -ve, C.diff -ve   cont dignishield     # chronic respiratory failure,   vent dependant via trach   trach care    # functional dysphagia,   sp peg   peg care     # Candiduria with U cx C tropicalis  # MDRO UTI and PNA-  s/p Fluconazole  s/p Ceftolozane-tazobactam    # Acute anemia  gatric lavage negative   resolved s/p Multiple transfusion    # Hyponatremia-resolved  -likely hypervolemic  -c/w bumex 2mg iv q12hr and HD per nephro    # Afib w/ RVR -   # Acute Decompensated CHF  # CAD s/p stents  # NSTEMI type 2  -now rate controlled- c/w Amio and Cardizem  -CHF resolved s/p diuresis  -Seen by cardio 5/31, recommend c/w asa and stating, medical management for NSTEMI    # DKA, resolved  -s/p insulin drip, now on basal/bolus and tube feeds  Monitor FS     # Hx ICH  # multiple sacral wounds  # functional paraplegia ,   Bedbound from NH  has upper ext contracture total care   c/w keppra 500mg BID for seizure prophylaxis  sacral and buttock wounds , un-stageable  Care instructions per wound care:   Clean sacrum , B/L buttock and upper back wound with vashe wound cleanser, pat dry then apply hydrogel with moist vashe guaze dressing  Q 12 hours   Continue skin barrier  to R heel - monitor progression  Pressure  injury  preventive  measures  Skin  and incontinence care         #Misc  -DVT prophylaxis: Heparin SQ  -GI prophylaxis: Protonix IV BID  -Diet: Tube feed  -Code status: DNR  -Activity: IAT  -Dispo: Vent unit- SNF when stable    Pending Nephro for long term HD then DC    #Hand off  HD tomorrow. K 5.7, recheck 8pm bmp   62 yo M with  PMH of ICH (2021) s/p trach and PEG, HTN, HLD, T2DM, CAD s/p stents, Recurrent C diff, BIBEMS from [facility] for abnormal labs. Patient non-verbal at baseline - limited history. Per NH chart  have a BUN greater than 200 and creatinine of 4.3 on outpatient labs. Outpatient CXR also w/ new L sided pleural effusion. In ED, patient was hypotensive with Afib with RVR, found to be in acure renal failure, anemic with elevated trops. He was started on Amiodarone GTT, PPI GTT, gastric lavage with coffee ground secretions, started on broad spectrum abx and admitted to MICU  While in MICU, course complicated by DKA, s/p insulin drip, MICHELLE started on HD, acute anemia s/p PRBC transfusion, NSTEMI, downgraded to SDU -> floor    # Acute Renal Failure, started on HD 6/1  # Mild Hyperkalemia  # Fluid Overload- Improving- c/w Bumex and HD  # HAGMA- resolved  - non oliguric, RBUS without hydro   - started on HD 6/1 via [facility]   - Nephro on board- cw HD  - s/p Midodrine- BP better, s/p Sodium Bicarb- acidosis improved  - TTE 6/1 - EF 63%, GIDD   - cw Lokelma and HD for high K- May need long term standing lokelma  -Bladder US: thick-walled bladder, no hydronephrosis, parenchymal disease      # Diarrhea   GI PCR -ve, C.diff -ve   cont dignishield     # chronic respiratory failure,   vent dependant via trach   trach care    # functional dysphagia,   sp peg   peg care     # Candiduria with U cx C tropicalis  # MDRO UTI and PNA-  s/p Fluconazole  s/p Ceftolozane-tazobactam    # Acute anemia  gatric lavage negative   resolved s/p Multiple transfusion    # Hyponatremia-resolved  -likely hypervolemic  -c/w bumex 2mg iv q12hr and HD per nephro    # Afib w/ RVR -   # Acute Decompensated CHF  # CAD s/p stents  # NSTEMI type 2  -now rate controlled- c/w Amio and Cardizem  -CHF resolved s/p diuresis  -Seen by cardio 5/31, recommend c/w asa and stating, medical management for NSTEMI    # DKA, resolved  -s/p insulin drip, now on basal/bolus and tube feeds  Monitor FS     # Hx ICH  # multiple sacral wounds  # functional paraplegia ,   Bedbound from NH  has upper ext contracture total care   c/w keppra 500mg BID for seizure prophylaxis  sacral and buttock wounds , un-stageable  Care instructions per wound care:   Clean sacrum , B/L buttock and upper back wound with vashe wound cleanser, pat dry then apply hydrogel with moist vashe guaze dressing  Q 12 hours   Continue skin barrier  to R heel - monitor progression  Pressure  injury  preventive  measures  Skin  and incontinence care         #Misc  -DVT prophylaxis: Heparin SQ  -GI prophylaxis: Protonix IV BID  -Diet: Tube feed  -Code status: DNR  -Activity: IAT  -Dispo: Vent unit- SNF when stable    Pending Nephro for long term HD then DC    #Hand off  -HD tomorrow. K 5.7, recheck 8pm bmp  -IR cannot place TDC because of leukocytosis. Pending either ID's confirmation of absence of infection or leukocytosis to resolve.

## 2023-07-03 NOTE — CHART NOTE - NSCHARTNOTEFT_GEN_A_CORE
Afebrile  No leukocytosis  No bacteremia     Cleared for tesio from ID standpoint    If any questions, please call or send a message on [employer] Teams  Please continue to update ID with any pertinent new laboratory or radiographic findings  Spectra [id number]

## 2023-07-03 NOTE — PROGRESS NOTE ADULT - SUBJECTIVE AND OBJECTIVE BOX
pt seen and examined.     Resident's notes reviewed, My notes supersede resident's notes in case of discrepancy       ROS: no cp, no sob, no n/v, no fever    Vital Signs Last 24 Hrs  T(C): 37.7 (03 Jul 2023 04:25), Max: 37.7 (03 Jul 2023 04:25)  T(F): 99.9 (03 Jul 2023 04:25), Max: 99.9 (03 Jul 2023 04:25)  HR: 83 (03 Jul 2023 04:30) (68 - 85)  BP: 122/65 (03 Jul 2023 04:25) (120/60 - 124/58)  BP(mean): --  RR: 20 (03 Jul 2023 04:25) (20 - 20)  SpO2: 100% (03 Jul 2023 04:30) (100% - 100%)    Parameters below as of 03 Jul 2023 04:25  Patient On (Oxygen Delivery Method): ventilator    constitutional NAD, blinks, non verbal non communicating , Respiratory trach in place,  lungs CTA, CVS heart RRR, GI: abdomen peg in place, Soft NT, ND, BS+, skin: unstageable sacral and buttock pressure ulcers   neuro exam pt cannot participate, has contracture     MEDICATIONS  (STANDING):  acetylcysteine 20%  Inhalation 4 milliLiter(s) Inhalation every 6 hours  albuterol/ipratropium for Nebulization 3 milliLiter(s) Nebulizer every 6 hours  aMIOdarone    Tablet   Oral   aMIOdarone    Tablet 200 milliGRAM(s) Oral daily  aspirin  chewable 81 milliGRAM(s) Oral daily  atorvastatin 80 milliGRAM(s) Oral at bedtime  buMETAnide Injectable 2 milliGRAM(s) IV Push every 12 hours  chlorhexidine 0.12% Liquid 15 milliLiter(s) Oral Mucosa two times a day  chlorhexidine 2% Cloths 1 Application(s) Topical <User Schedule>  dextrose 5%. 1000 milliLiter(s) (50 mL/Hr) IV Continuous <Continuous>  dextrose 5%. 1000 milliLiter(s) (100 mL/Hr) IV Continuous <Continuous>  dextrose 50% Injectable 25 Gram(s) IV Push once  dextrose 50% Injectable 25 Gram(s) IV Push once  dextrose 50% Injectable 12.5 Gram(s) IV Push once  diltiazem    milliGRAM(s) Oral daily  glucagon  Injectable 1 milliGRAM(s) IntraMuscular once  heparin   Injectable 5000 Unit(s) SubCutaneous every 12 hours  insulin glargine Injectable (LANTUS) 10 Unit(s) SubCutaneous at bedtime  insulin lispro (ADMELOG) corrective regimen sliding scale   SubCutaneous three times a day before meals  levETIRAcetam  Solution 500 milliGRAM(s) Oral two times a day  pantoprazole  Injectable 40 milliGRAM(s) IV Push every 12 hours  sodium phosphate 30 milliMole(s)/500 mL IVPB 30 milliMole(s) IV Intermittent once    MEDICATIONS  (PRN):  acetaminophen     Tablet .. 650 milliGRAM(s) Oral every 6 hours PRN Temp greater or equal to 38C (100.4F), Mild Pain (1 - 3)  dextrose Oral Gel 15 Gram(s) Oral once PRN Blood Glucose LESS THAN 70 milliGRAM(s)/deciliter    Procalcitonin, Serum: 1.38 ng/mL [0.02 - 0.10] (06-12-23 @ 09:44)  Procalcitonin, Serum: 1.12 ng/mL [0.02 - 0.10] (06-11-23 @ 21:02)  Ferritin, Serum: 1956 ng/mL [30 - 400] (06-06-23 @ 10:40)  Procalcitonin, Serum: 2.38 ng/mL [0.02 - 0.10] (05-31-23 @ 05:50)    a/p  64 yo M with  PMH of ICH (2021) s/p trach and PEG, HTN, HLD, T2DM, CAD s/p stents, Recurrent C diff, BIBEMS from [facility] for abnormal labs. Patient non-verbal at baseline - limited history. Per NH chart  have a BUN greater than 200 and creatinine of 4.3 on outpatient labs. Outpatient CXR also w/ new L sided pleural effusion. In ED, patient was hypotensive with Afib with RVR, found to be in acure renal failure, anemic with elevated trops. He was started on Amiodarone GTT, PPI GTT, gastric lavage with coffee ground secretions, started on broad spectrum abx and admitted to MICU  While in MICU, course complicated by DKA, s/p insulin drip, MICHELLE started on HD, acute anemia s/p PRBC transfusion, NSTEMI, downgraded to SDU -> floor    # Acute Renal Failure, started on HD 6/1, dw nephro , ir consult for tesio   creatinine trend  2.7 (07-01-23 @ 10:25)  2.7 (06-29-23 @ 13:32)  2.5 (06-28-23 @ 12:00)    # diarrhea , neg cdiff and gi pcr, immodium one dose , improved  dw nutrition, pt is not on any stool softners since 6/16    # chronic respiratory failure, vent dependant via trach , trach care, fu pulm   # functional dysphagia, sp peg, peg care   # fever, suspected VAP-improved  #Candiduria with U cx C tropicalis  # Acute anemia  # Afib w/ RVR - now rate controlled # Acute Decompensated CHF  CAD s/p stents  NSTEMI, medical management   # DKA, resolved,   # DM cont inulin per protocol  CAPILLARY BLOOD GLUCOSE    POCT Blood Glucose.: 142 mg/dL (03 Jul 2023 07:36)  POCT Blood Glucose.: 93 mg/dL (03 Jul 2023 06:10)  POCT Blood Glucose.: 86 mg/dL (02 Jul 2023 22:26)  POCT Blood Glucose.: 117 mg/dL (02 Jul 2023 17:19)  POCT Blood Glucose.: 131 mg/dL (02 Jul 2023 11:05)      # Hx ICH   Bedbound from NH   - c/w keppra 500mg BID for seizure prophylaxis  # multiple sacral wounds. sacral and buttock , un-satgeable   wound care nurse to reevulate     # functional paraplegia , hx of ICH, has upper ext contracture total care     #Progress Note Handoff  Pending :  tesio this upcoming week, dw IR yesterday , they want ID consult and NL leukocytosis  for clearance for Tesio placement, repeat cbc  Family discussion: resident updated family   Disposition: SNF when stable   pt is very high risk for complications of vent and hospitalization   time spent 50 min.

## 2023-07-03 NOTE — PROGRESS NOTE ADULT - ATTENDING COMMENTS
a/p  64 yo M with  PMH of ICH (2021) s/p trach and PEG, HTN, HLD, T2DM, CAD s/p stents, Recurrent C diff, BIBEMS from [facility] for abnormal labs. Patient non-verbal at baseline - limited history. Per NH chart  have a BUN greater than 200 and creatinine of 4.3 on outpatient labs. Outpatient CXR also w/ new L sided pleural effusion. In ED, patient was hypotensive with Afib with RVR, found to be in acure renal failure, anemic with elevated trops. He was started on Amiodarone GTT, PPI GTT, gastric lavage with coffee ground secretions, started on broad spectrum abx and admitted to MICU  While in MICU, course complicated by DKA, s/p insulin drip, MICHELLE started on HD, acute anemia s/p PRBC transfusion, NSTEMI, downgraded to SDU -> floor    # Acute Renal Failure, started on HD 6/1, dw nephro , ir consult for tesio   creatinine trend  2.7 (07-01-23 @ 10:25)  2.7 (06-29-23 @ 13:32)  2.5 (06-28-23 @ 12:00)  2.0 (06-27-23 @ 06:36)    # diarrhea , neg cdiff and gi pcr, immodium one dose , improved  dw nutrition, pt is not on any stool softners since 6/16    # chronic respiratory failure, vent dependant via trach , trach care, fu pulm   # functional dysphagia, sp peg, peg care   # fever, suspected VAP-improved  #Candiduria with U cx C tropicalis  # Acute anemia  # Afib w/ RVR - now rate controlled # Acute Decompensated CHF  CAD s/p stents  NSTEMI, medical management   # DKA, resolved  # Hx ICH   Bedbound from NH   - c/w keppra 500mg BID for seizure prophylaxis  # multiple sacral wounds. sacral and buttock , un-satgeable   wound care nurse to reevulate     # functional paraplegia , hx of ICH, has upper ext contracture total care     #Progress Note Handoff  Pending :  monitor labs, wound care team consult   Family discussion: resident updated family   Disposition: SNF when stable   pt is very high risk for complications of vent and hospitalization   time spent 50 min.

## 2023-07-03 NOTE — CHART NOTE - NSCHARTNOTESELECT_GEN_ALL_CORE
Event Note
Event Note
ID/Event Note
palliative/Off Service Note
Palliative
Palliative Care - Social Work/Event Note
Palliative Care - Social Work/Event Note
Transfer Note
Transfer Note

## 2023-07-03 NOTE — PROGRESS NOTE ADULT - SUBJECTIVE AND OBJECTIVE BOX
Patient is a 63y old  Male who presents with a chief complaint of Abnormal labs (02 Jul 2023 13:27)        Over Night Events:        ROS:     All ROS are negative except HPI         PHYSICAL EXAM    ICU Vital Signs Last 24 Hrs  T(C): 37.7 (03 Jul 2023 04:25), Max: 37.7 (03 Jul 2023 04:25)  T(F): 99.9 (03 Jul 2023 04:25), Max: 99.9 (03 Jul 2023 04:25)  HR: 83 (03 Jul 2023 04:30) (68 - 85)  BP: 122/65 (03 Jul 2023 04:25) (120/60 - 124/58)  BP(mean): --  ABP: --  ABP(mean): --  RR: 20 (03 Jul 2023 04:25) (20 - 20)  SpO2: 100% (03 Jul 2023 04:30) (100% - 100%)    O2 Parameters below as of 03 Jul 2023 04:25  Patient On (Oxygen Delivery Method): ventilator            CONSTITUTIONAL:  Well nourished.  NAD    ENT:   Airway patent,   Mouth with normal mucosa.   No thrush    EYES:   Pupils equal,   Round and reactive to light.    CARDIAC:   Normal rate,   Regular rhythm.    No edema      Vascular:  Normal systolic impulse  No Carotid bruits    RESPIRATORY:   No wheezing  Bilateral BS  Normal chest expansion  Not tachypneic,  No use of accessory muscles    GASTROINTESTINAL:  Abdomen soft,   Non-tender,   No guarding,   + BS    MUSCULOSKELETAL:   Range of motion is not limited,  No clubbing, cyanosis    NEUROLOGICAL:   Does not follow commands     SKIN:   Skin normal color for race,         07-02-23 @ 07:01  -  07-03-23 @ 07:00  --------------------------------------------------------  IN:    Enteral Tube Flush: 180 mL    Peptamen A.F.: 1125 mL  Total IN: 1305 mL    OUT:  Total OUT: 0 mL    Total NET: 1305 mL          LABS:                            7.7    12.82 )-----------( 512      ( 01 Jul 2023 10:25 )             24.4                                               07-01    132<L>  |  92<L>  |  71<HH>  ----------------------------<  155<H>  6.0<HH>   |  29  |  2.7<H>    Ca    9.1      01 Jul 2023 10:25  Phos  1.9     07-01  Mg     2.2     07-01    TPro  5.9<L>  /  Alb  2.2<L>  /  TBili  0.3  /  DBili  x   /  AST  23  /  ALT  20  /  AlkPhos  206<H>  07-01                                             Urinalysis Basic - ( 01 Jul 2023 10:25 )    Color: x / Appearance: x / SG: x / pH: x  Gluc: 155 mg/dL / Ketone: x  / Bili: x / Urobili: x   Blood: x / Protein: x / Nitrite: x   Leuk Esterase: x / RBC: x / WBC x   Sq Epi: x / Non Sq Epi: x / Bacteria: x                                                  LIVER FUNCTIONS - ( 01 Jul 2023 10:25 )  Alb: 2.2 g/dL / Pro: 5.9 g/dL / ALK PHOS: 206 U/L / ALT: 20 U/L / AST: 23 U/L / GGT: x                                                                                               Mode: AC/ CMV (Assist Control/ Continuous Mandatory Ventilation)  RR (machine): 14  TV (machine): 450  FiO2: 40  PEEP: 8  ITime: 1  MAP: 12  PIP: 26                                          MEDICATIONS  (STANDING):  acetylcysteine 20%  Inhalation 4 milliLiter(s) Inhalation every 6 hours  albuterol/ipratropium for Nebulization 3 milliLiter(s) Nebulizer every 6 hours  aMIOdarone    Tablet 200 milliGRAM(s) Oral daily  aMIOdarone    Tablet   Oral   aspirin  chewable 81 milliGRAM(s) Oral daily  atorvastatin 80 milliGRAM(s) Oral at bedtime  buMETAnide Injectable 2 milliGRAM(s) IV Push every 12 hours  chlorhexidine 0.12% Liquid 15 milliLiter(s) Oral Mucosa two times a day  chlorhexidine 2% Cloths 1 Application(s) Topical <User Schedule>  dextrose 5%. 1000 milliLiter(s) (50 mL/Hr) IV Continuous <Continuous>  dextrose 5%. 1000 milliLiter(s) (100 mL/Hr) IV Continuous <Continuous>  dextrose 50% Injectable 25 Gram(s) IV Push once  dextrose 50% Injectable 25 Gram(s) IV Push once  dextrose 50% Injectable 12.5 Gram(s) IV Push once  diltiazem    milliGRAM(s) Oral daily  glucagon  Injectable 1 milliGRAM(s) IntraMuscular once  heparin   Injectable 5000 Unit(s) SubCutaneous every 12 hours  insulin glargine Injectable (LANTUS) 10 Unit(s) SubCutaneous at bedtime  insulin lispro (ADMELOG) corrective regimen sliding scale   SubCutaneous three times a day before meals  levETIRAcetam  Solution 500 milliGRAM(s) Oral two times a day  pantoprazole  Injectable 40 milliGRAM(s) IV Push every 12 hours  sodium phosphate 30 milliMole(s)/500 mL IVPB 30 milliMole(s) IV Intermittent once    MEDICATIONS  (PRN):  acetaminophen     Tablet .. 650 milliGRAM(s) Oral every 6 hours PRN Temp greater or equal to 38C (100.4F), Mild Pain (1 - 3)  dextrose Oral Gel 15 Gram(s) Oral once PRN Blood Glucose LESS THAN 70 milliGRAM(s)/deciliter  sodium chloride 0.9% Bolus. 100 milliLiter(s) IV Bolus every 5 minutes PRN SBP LESS THAN or EQUAL to 80 mmHg  sodium chloride 0.9% Bolus. 100 milliLiter(s) IV Bolus every 5 minutes PRN SBP LESS THAN or EQUAL to 100 mmHg  sodium chloride 0.9% Bolus. 100 milliLiter(s) IV Bolus every 5 minutes PRN SBP LESS THAN or EQUAL to 100 mmHg      New X-rays reviewed:                                                                                  ECHO

## 2023-07-04 NOTE — PROGRESS NOTE ADULT - ATTENDING COMMENTS
a/p  62 yo M with  PMH of ICH (2021) s/p trach and PEG, HTN, HLD, T2DM, CAD s/p stents, Recurrent C diff, BIBEMS from [facility] for abnormal labs. Patient non-verbal at baseline - limited history. Per NH chart  have a BUN greater than 200 and creatinine of 4.3 on outpatient labs. Outpatient CXR also w/ new L sided pleural effusion. In ED, patient was hypotensive with Afib with RVR, found to be in acure renal failure, anemic with elevated trops. He was started on Amiodarone GTT, PPI GTT, gastric lavage with coffee ground secretions, started on broad spectrum abx and admitted to MICU  While in MICU, course complicated by DKA, s/p insulin drip, MICHELLE started on HD, acute anemia s/p PRBC transfusion, NSTEMI, downgraded to SDU -> floor    # Acute Renal Failure, started on HD 6/1, dw nephro , ir consult for tesio   creatinine trend  2.7 (07-01-23 @ 10:25)  2.7 (06-29-23 @ 13:32)  2.5 (06-28-23 @ 12:00)  2.0 (06-27-23 @ 06:36)    # diarrhea , neg cdiff and gi pcr, immodium one dose , improved  dw nutrition, pt is not on any stool softners since 6/16    # chronic respiratory failure, vent dependant via trach , trach care, fu pulm   # functional dysphagia, sp peg, peg care   # fever, suspected VAP-improved  #Candiduria with U cx C tropicalis  # Acute anemia  # Afib w/ RVR - now rate controlled # Acute Decompensated CHF  CAD s/p stents  NSTEMI, medical management   # DKA, resolved  # Hx ICH   Bedbound from NH   - c/w keppra 500mg BID for seizure prophylaxis  # multiple sacral wounds. sacral and buttock , un-satgeable   wound care nurse to reevulate     # functional paraplegia , hx of ICH, has upper ext contracture total care     #Progress Note Handoff  Pending :  monitor labs, wound care team consult   Family discussion: resident updated family   Disposition: SNF when stable   pt is very high risk for complications of vent and hospitalization   time spent 50 min. a/p  62 yo M with  PMH of ICH (2021) s/p trach and PEG, HTN, HLD, T2DM, CAD s/p stents, Recurrent C diff, BIBEMS from [facility] for abnormal labs. Patient non-verbal at baseline - limited history. Per NH chart  have a BUN greater than 200 and creatinine of 4.3 on outpatient labs. Outpatient CXR also w/ new L sided pleural effusion. In ED, patient was hypotensive with Afib with RVR, found to be in acure renal failure, anemic with elevated trops. He was started on Amiodarone GTT, PPI GTT, gastric lavage with coffee ground secretions, started on broad spectrum abx and admitted to MICU  While in MICU, course complicated by DKA, s/p insulin drip, MICHELLE started on HD, acute anemia s/p PRBC transfusion, NSTEMI, downgraded to SDU -> floor    # Acute Renal Failure, started on HD 6/1, dw nephro , ir consult for tesio   creatinine trend  2.5 (07-03-23 @ 12:22)  2.7 (07-01-23 @ 10:25)  2.7 (06-29-23 @ 13:32)      # diarrhea , neg cdiff and gi pcr, immodium one dose , improved  dw nutrition, pt is not on any stool softners since 6/16    # chronic respiratory failure, vent dependant via trach , trach care, fu pulm   # functional dysphagia, sp peg, peg care   # fever, suspected VAP-improved  #Candiduria with U cx C tropicalis  # Acute anemia  # Afib w/ RVR - now rate controlled # Acute Decompensated CHF  CAD s/p stents  NSTEMI, medical management   # DKA, resolved  # Hx ICH   Bedbound from NH   - c/w keppra 500mg BID for seizure prophylaxis  # multiple sacral wounds. sacral and buttock , un-satgeable   wound care nurse to reevulate     # functional paraplegia , hx of ICH, has upper ext contracture total care     #Progress Note Handoff  Pending :  monitor labs, cont HD, plan is Tesio by IR this week   Family discussion: resident updated family   Disposition: SNF when stable   pt is very high risk for complications of vent and hospitalization   time spent 50 min.

## 2023-07-04 NOTE — PROGRESS NOTE ADULT - ASSESSMENT
64 YO F  PMH of ICH (2021) s/p trach and PEG, HTN, HLD, T2DM, CAD s/p stents, Recurrent C diff, BIBEMS from [facility] for abnormal labs.     MICHELLE on CKD 3a - severe azotemia  likely due to GIB / hypotension  Started HD on 6/1/23  Acute anemia d/t UGIB  Troponemia  Lactic acidosis resolved   DKA resolved   Afib     -  HD today  3h opti 160 2k bath   -  phos 1.8 not on binders / repeat levels please   -  continue bumex , document UOP   -  h/h noted / blood tx /   -  feed : nepro k noted / lokelma  10 g  q 8   - will need TDC this week / cleared by ID   - very poor prognosis   will follow

## 2023-07-04 NOTE — PROGRESS NOTE ADULT - ASSESSMENT
62 yo M with  PMH of ICH (2021) s/p trach and PEG, HTN, HLD, T2DM, CAD s/p stents, Recurrent C diff, BIBEMS from [facility] for abnormal labs. Patient non-verbal at baseline - limited history. Per NH chart  have a BUN greater than 200 and creatinine of 4.3 on outpatient labs. Outpatient CXR also w/ new L sided pleural effusion. In ED, patient was hypotensive with Afib with RVR, found to be in acure renal failure, anemic with elevated trops. He was started on Amiodarone GTT, PPI GTT, gastric lavage with coffee ground secretions, started on broad spectrum abx and admitted to MICU  While in MICU, course complicated by DKA, s/p insulin drip, MICHELLE started on HD, acute anemia s/p PRBC transfusion, NSTEMI, downgraded to SDU -> floor    # Acute Renal Failure, started on HD 6/1  -Patient cleared by ID for TDC placement. Pending IR  # Mild Hyperkalemia  # Fluid Overload- Improving- c/w Bumex and HD  # HAGMA- resolved  - non oliguric, RBUS without hydro   - started on HD 6/1 via R IJ [place]   - Nephro on board- cw HD  - s/p Midodrine- BP better, s/p Sodium Bicarb- acidosis improved  - TTE 6/1 - EF 63%, GIDD   - cw Lokelma and HD for high K- May need long term standing lokelma  -Bladder US: thick-walled bladder, no hydronephrosis, parenchymal disease      # Diarrhea   GI PCR -ve, C.diff -ve   cont dignishield     # chronic respiratory failure,   vent dependant via trach   trach care    # functional dysphagia,   sp peg   peg care     # Candiduria with U cx C tropicalis  # MDRO UTI and PNA-  s/p Fluconazole  s/p Ceftolozane-tazobactam    # Acute anemia  gatric lavage negative   resolved s/p Multiple transfusion    # Hyponatremia-resolved  -likely hypervolemic  -c/w bumex 2mg iv q12hr and HD per nephro    # Afib w/ RVR -   # Acute Decompensated CHF  # CAD s/p stents  # NSTEMI type 2  -now rate controlled- c/w Amio and Cardizem  -CHF resolved s/p diuresis  -Seen by cardio 5/31, recommend c/w asa and stating, medical management for NSTEMI    # DKA, resolved  -s/p insulin drip, now on basal/bolus and tube feeds  Monitor FS     # Hx ICH  # multiple sacral wounds  # functional paraplegia ,   Bedbound from NH  has upper ext contracture total care   c/w keppra 500mg BID for seizure prophylaxis  sacral and buttock wounds , un-stageable  Care instructions per wound care:   Clean sacrum , B/L buttock and upper back wound with vashe wound cleanser, pat dry then apply hydrogel with moist vashe guaze dressing  Q 12 hours   Continue skin barrier  to R heel - monitor progression  Pressure  injury  preventive  measures  Skin  and incontinence care         #Misc  -DVT prophylaxis: Heparin SQ  -GI prophylaxis: Protonix IV BID  -Diet: Tube feed  -Code status: DNR  -Activity: IAT  -Dispo: Vent unit- SNF when stable    Pending Nephro for long term HD then DC    #Hand off  -ID cleared the patient for TDC placement. F/u with IR   62 yo M with  PMH of ICH (2021) s/p trach and PEG, HTN, HLD, T2DM, CAD s/p stents, Recurrent C diff, BIBEMS from [facility] for abnormal labs. Patient non-verbal at baseline - limited history. Per NH chart  have a BUN greater than 200 and creatinine of 4.3 on outpatient labs. Outpatient CXR also w/ new L sided pleural effusion. In ED, patient was hypotensive with Afib with RVR, found to be in acure renal failure, anemic with elevated trops. He was started on Amiodarone GTT, PPI GTT, gastric lavage with coffee ground secretions, started on broad spectrum abx and admitted to MICU  While in MICU, course complicated by DKA, s/p insulin drip, MICHELLE started on HD, acute anemia s/p PRBC transfusion, NSTEMI, downgraded to SDU -> floor    # Acute Renal Failure, started on HD 6/1  -Patient cleared by ID for TDC placement. Pending IR  # Mild Hyperkalemia  # Fluid Overload- Improving- c/w Bumex and HD  # HAGMA- resolved  - non oliguric, RBUS without hydro   - started on HD 6/1 via R IJ [place]   - Nephro on board- cw HD  - s/p Midodrine- BP better, s/p Sodium Bicarb- acidosis improved  - TTE 6/1 - EF 63%, GIDD   - cw Lokelma and HD for high K- May need long term standing lokelma  -Bladder US: thick-walled bladder, no hydronephrosis, parenchymal disease      # Diarrhea   GI PCR -ve, C.diff -ve   cont dignishield     # chronic respiratory failure,   vent dependant via trach   trach care    # functional dysphagia,   sp peg   peg care     # Candiduria with U cx C tropicalis  # MDRO UTI and PNA-  s/p Fluconazole  s/p Ceftolozane-tazobactam    # Acute anemia  gatric lavage negative   resolved s/p Multiple transfusion    # Hyponatremia-resolved  -likely hypervolemic  -c/w bumex 2mg iv q12hr and HD per nephro    # Afib w/ RVR -   # Acute Decompensated CHF  # CAD s/p stents  # NSTEMI type 2  -now rate controlled- c/w Amio and Cardizem  -CHF resolved s/p diuresis  -Seen by cardio 5/31, recommend c/w asa and stating, medical management for NSTEMI    # DKA, resolved  -s/p insulin drip, now on basal/bolus and tube feeds  Monitor FS     # Hx ICH  # multiple sacral wounds  # functional paraplegia ,   Bedbound from NH  has upper ext contracture total care   c/w keppra 500mg BID for seizure prophylaxis  sacral and buttock wounds , un-stageable  Care instructions per wound care:   Clean sacrum , B/L buttock and upper back wound with vashe wound cleanser, pat dry then apply hydrogel with moist vashe guaze dressing  Q 12 hours   Continue skin barrier  to R heel - monitor progression  Pressure  injury  preventive  measures  Skin  and incontinence care         #Misc  -DVT prophylaxis: Heparin SQ  -GI prophylaxis: Protonix IV BID  -Diet: Tube feed  -Code status: DNR  -Activity: IAT  -Dispo: Vent unit- SNF when stable    Pending Nephro for long term HD then DC    #Hand off  -ID cleared the patient for TDC placement. F/u with IR  -f/u BMP

## 2023-07-04 NOTE — PROGRESS NOTE ADULT - SUBJECTIVE AND OBJECTIVE BOX
Nephrology progress note    THIS IS AN INCOMPLETE NOTE . FULL NOTE TO FOLLOW SHORTLY    Patient is seen and examined, events over the last 24 h noted .    Allergies:  penicillin (Other)    Hospital Medications:   MEDICATIONS  (STANDING):  acetylcysteine 20%  Inhalation 4 milliLiter(s) Inhalation every 6 hours  albuterol/ipratropium for Nebulization 3 milliLiter(s) Nebulizer every 6 hours  aMIOdarone    Tablet   Oral   aMIOdarone    Tablet 200 milliGRAM(s) Oral daily  aspirin  chewable 81 milliGRAM(s) Oral daily  atorvastatin 80 milliGRAM(s) Oral at bedtime  buMETAnide Injectable 2 milliGRAM(s) IV Push every 12 hours  chlorhexidine 0.12% Liquid 15 milliLiter(s) Oral Mucosa two times a day  chlorhexidine 2% Cloths 1 Application(s) Topical <User Schedule>  dextrose 5%. 1000 milliLiter(s) (50 mL/Hr) IV Continuous <Continuous>  dextrose 5%. 1000 milliLiter(s) (100 mL/Hr) IV Continuous <Continuous>  dextrose 50% Injectable 12.5 Gram(s) IV Push once  dextrose 50% Injectable 25 Gram(s) IV Push once  dextrose 50% Injectable 25 Gram(s) IV Push once  diltiazem    milliGRAM(s) Oral daily  glucagon  Injectable 1 milliGRAM(s) IntraMuscular once  heparin   Injectable 5000 Unit(s) SubCutaneous every 12 hours  insulin glargine Injectable (LANTUS) 10 Unit(s) SubCutaneous at bedtime  insulin lispro (ADMELOG) corrective regimen sliding scale   SubCutaneous three times a day before meals  levETIRAcetam  Solution 500 milliGRAM(s) Oral two times a day  pantoprazole  Injectable 40 milliGRAM(s) IV Push every 12 hours  sodium phosphate 30 milliMole(s)/500 mL IVPB 30 milliMole(s) IV Intermittent once        VITALS:  T(F): 98.7 (07-04-23 @ 05:00), Max: 99.1 (07-03-23 @ 20:51)  HR: 85 (07-04-23 @ 05:00)  BP: 100/57 (07-04-23 @ 05:00)  RR: 18 (07-04-23 @ 05:00)  SpO2: 100% (07-04-23 @ 05:00)  Wt(kg): --    07-02 @ 07:01 - 07-03 @ 07:00  --------------------------------------------------------  IN: 1305 mL / OUT: 0 mL / NET: 1305 mL    07-03 @ 07:01 - 07-04 @ 07:00  --------------------------------------------------------  IN: 870 mL / OUT: 0 mL / NET: 870 mL          PHYSICAL EXAM:  Constitutional: NAD  HEENT: anicteric sclera, oropharynx clear, MMM  Neck: No JVD  Respiratory: CTAB, no wheezes, rales or rhonchi  Cardiovascular: S1, S2, RRR  Gastrointestinal: BS+, soft, NT/ND  Extremities: No cyanosis or clubbing. No peripheral edema  :  No teixeira.   Skin: No rashes    LABS:  07-03    133<L>  |  95<L>  |  65<HH>  ----------------------------<  120<H>  5.7<H>   |  28  |  2.5<H>    Ca    8.9      03 Jul 2023 12:22  Mg     2.2     07-03    TPro  5.3<L>  /  Alb  2.1<L>  /  TBili  0.3  /  DBili      /  AST  20  /  ALT  17  /  AlkPhos  171<H>  07-03                          7.7    11.17 )-----------( 446      ( 03 Jul 2023 12:22 )             24.3       Urine Studies:  Urinalysis Basic - ( 03 Jul 2023 12:22 )    Color:  / Appearance:  / SG:  / pH:   Gluc: 120 mg/dL / Ketone:   / Bili:  / Urobili:    Blood:  / Protein:  / Nitrite:    Leuk Esterase:  / RBC:  / WBC    Sq Epi:  / Non Sq Epi:  / Bacteria:           Iron 51, TIBC 111, %sat 46      [06-06-23 @ 10:40]  Ferritin 1956      [06-06-23 @ 10:40]  Vitamin D (25OH) 70      [06-03-23 @ 06:50]    HBsAb <3.0      [06-01-23 @ 22:50]  HBsAb Nonreact      [06-01-23 @ 22:50]  HBsAg Nonreact      [06-01-23 @ 22:50]  HBcAb Nonreact      [06-01-23 @ 22:50]  HCV 0.09, Nonreact      [11-05-21 @ 09:04]  HIV Nonreact      [06-08-23 @ 16:00]  HIV Nonreact      [06-08-23 @ 12:20]        RADIOLOGY & ADDITIONAL STUDIES:   Nephrology progress note    Patient is seen and examined, events over the last 24 h noted .  Trached on MV     Allergies:  penicillin (Other)    Hospital Medications:   MEDICATIONS  (STANDING):  acetylcysteine 20%  Inhalation 4 milliLiter(s) Inhalation every 6 hours  albuterol/ipratropium for Nebulization 3 milliLiter(s) Nebulizer every 6 hours  aMIOdarone    Tablet 200 milliGRAM(s) Oral daily  aspirin  chewable 81 milliGRAM(s) Oral daily  atorvastatin 80 milliGRAM(s) Oral at bedtime  buMETAnide Injectable 2 milliGRAM(s) IV Push every 12 hours  diltiazem    milliGRAM(s) Oral daily  glucagon  Injectable 1 milliGRAM(s) IntraMuscular once  heparin   Injectable 5000 Unit(s) SubCutaneous every 12 hours  insulin glargine Injectable (LANTUS) 10 Unit(s) SubCutaneous at bedtime  insulin lispro (ADMELOG) corrective regimen sliding scale   SubCutaneous three times a day before meals  levETIRAcetam  Solution 500 milliGRAM(s) Oral two times a day  pantoprazole  Injectable 40 milliGRAM(s) IV Push every 12 hours  sodium phosphate 30 milliMole(s)/500 mL IVPB 30 milliMole(s) IV Intermittent once        VITALS:  T(F): 98.7 (07-04-23 @ 05:00), Max: 99.1 (07-03-23 @ 20:51)  HR: 85 (07-04-23 @ 05:00)  BP: 100/57 (07-04-23 @ 05:00)  RR: 18 (07-04-23 @ 05:00)  SpO2: 100% (07-04-23 @ 05:00)      07-02 @ 07:01  -  07-03 @ 07:00  --------------------------------------------------------  IN: 1305 mL / OUT: 0 mL / NET: 1305 mL    07-03 @ 07:01  -  07-04 @ 07:00  --------------------------------------------------------  IN: 870 mL / OUT: 0 mL / NET: 870 mL          PHYSICAL EXAM:  Constitutional: NAD  Respiratory: trached on MV   Cardiovascular: S1, S2, RRR  Gastrointestinal: BS+, soft, NT/ND  Extremities: No cyanosis or clubbing. No peripheral edema  :  No teixeira.   Skin: No rashes    LABS:      07-03    133<L>  |  95<L>  |  65<HH>  ----------------------------<  120<H>  5.7<H>   |  28  |  2.5<H>    Ca    8.9      03 Jul 2023 12:22  Mg     2.2     07-03    TPro  5.3<L>  /  Alb  2.1<L>  /  TBili  0.3  /  DBili      /  AST  20  /  ALT  17  /  AlkPhos  171<H>  07-03                          7.7    11.17 )-----------( 446      ( 03 Jul 2023 12:22 )             24.3       Urine Studies:  Urinalysis Basic - ( 03 Jul 2023 12:22 )    Color:  / Appearance:  / SG:  / pH:   Gluc: 120 mg/dL / Ketone:   / Bili:  / Urobili:    Blood:  / Protein:  / Nitrite:    Leuk Esterase:  / RBC:  / WBC    Sq Epi:  / Non Sq Epi:  / Bacteria:           Iron 51, TIBC 111, %sat 46      [06-06-23 @ 10:40]  Ferritin 1956      [06-06-23 @ 10:40]  Vitamin D (25OH) 70      [06-03-23 @ 06:50]    HBsAb <3.0      [06-01-23 @ 22:50]  HBsAb Nonreact      [06-01-23 @ 22:50]  HBsAg Nonreact      [06-01-23 @ 22:50]  HBcAb Nonreact      [06-01-23 @ 22:50]  HCV 0.09, Nonreact      [11-05-21 @ 09:04]  HIV Nonreact      [06-08-23 @ 16:00]  HIV Nonreact      [06-08-23 @ 12:20]        RADIOLOGY & ADDITIONAL STUDIES:

## 2023-07-04 NOTE — PROGRESS NOTE ADULT - SUBJECTIVE AND OBJECTIVE BOX
Patient is a 63y old  Male who presents with a chief complaint of Abnormal labs (04 Jul 2023 08:37)        Over Night Events:  no acute events  synchronous w/ vent        PHYSICAL EXAM    ICU Vital Signs Last 24 Hrs  T(C): 37.1 (04 Jul 2023 05:00), Max: 37.3 (03 Jul 2023 20:51)  T(F): 98.7 (04 Jul 2023 05:00), Max: 99.1 (03 Jul 2023 20:51)  HR: 80 (04 Jul 2023 10:50) (80 - 85)  BP: 112/52 (04 Jul 2023 10:50) (99/58 - 121/60)  BP(mean): --  ABP: --  ABP(mean): --  RR: 18 (04 Jul 2023 10:50) (18 - 18)  SpO2: 100% (04 Jul 2023 10:50) (100% - 100%)    O2 Parameters below as of 04 Jul 2023 10:50  Patient On (Oxygen Delivery Method): ventilator    O2 Concentration (%): 40      General: ill looking  HEENT: trach          Lungs: Bilateral rhonchi  Cardiovascular: Regular   Abdomen: Soft, Positive BS  not following commands          07-03-23 @ 07:01  -  07-04-23 @ 07:00  --------------------------------------------------------  IN:    Enteral Tube Flush: 120 mL    Peptamen A.F.: 750 mL  Total IN: 870 mL    OUT:  Total OUT: 0 mL    Total NET: 870 mL          LABS:                            7.5    13.33 )-----------( 437      ( 04 Jul 2023 08:55 )             23.5                                               07-03    133<L>  |  95<L>  |  65<HH>  ----------------------------<  120<H>  5.7<H>   |  28  |  2.5<H>    Ca    8.9      03 Jul 2023 12:22  Mg     2.2     07-03    TPro  5.3<L>  /  Alb  2.1<L>  /  TBili  0.3  /  DBili  x   /  AST  20  /  ALT  17  /  AlkPhos  171<H>  07-03                                             Urinalysis Basic - ( 03 Jul 2023 12:22 )    Color: x / Appearance: x / SG: x / pH: x  Gluc: 120 mg/dL / Ketone: x  / Bili: x / Urobili: x   Blood: x / Protein: x / Nitrite: x   Leuk Esterase: x / RBC: x / WBC x   Sq Epi: x / Non Sq Epi: x / Bacteria: x                                                  LIVER FUNCTIONS - ( 03 Jul 2023 12:22 )  Alb: 2.1 g/dL / Pro: 5.3 g/dL / ALK PHOS: 171 U/L / ALT: 17 U/L / AST: 20 U/L / GGT: x                                                                                               Mode: AC/ CMV (Assist Control/ Continuous Mandatory Ventilation)  RR (machine): 14  TV (machine): 450  FiO2: 40  PEEP: 8  ITime: 1  MAP: 12  PIP: 32                                          MEDICATIONS  (STANDING):  acetylcysteine 20%  Inhalation 4 milliLiter(s) Inhalation every 6 hours  albuterol/ipratropium for Nebulization 3 milliLiter(s) Nebulizer every 6 hours  aMIOdarone    Tablet 200 milliGRAM(s) Oral daily  aMIOdarone    Tablet   Oral   aspirin  chewable 81 milliGRAM(s) Oral daily  atorvastatin 80 milliGRAM(s) Oral at bedtime  buMETAnide Injectable 2 milliGRAM(s) IV Push every 12 hours  chlorhexidine 0.12% Liquid 15 milliLiter(s) Oral Mucosa two times a day  chlorhexidine 2% Cloths 1 Application(s) Topical <User Schedule>  dextrose 5%. 1000 milliLiter(s) (100 mL/Hr) IV Continuous <Continuous>  dextrose 5%. 1000 milliLiter(s) (50 mL/Hr) IV Continuous <Continuous>  dextrose 50% Injectable 25 Gram(s) IV Push once  dextrose 50% Injectable 25 Gram(s) IV Push once  dextrose 50% Injectable 12.5 Gram(s) IV Push once  diltiazem    milliGRAM(s) Oral daily  glucagon  Injectable 1 milliGRAM(s) IntraMuscular once  heparin   Injectable 5000 Unit(s) SubCutaneous every 12 hours  insulin glargine Injectable (LANTUS) 10 Unit(s) SubCutaneous at bedtime  insulin lispro (ADMELOG) corrective regimen sliding scale   SubCutaneous three times a day before meals  levETIRAcetam  Solution 500 milliGRAM(s) Oral two times a day  pantoprazole  Injectable 40 milliGRAM(s) IV Push every 12 hours  sodium phosphate 30 milliMole(s)/500 mL IVPB 30 milliMole(s) IV Intermittent once    MEDICATIONS  (PRN):  acetaminophen     Tablet .. 650 milliGRAM(s) Oral every 6 hours PRN Temp greater or equal to 38C (100.4F), Mild Pain (1 - 3)  dextrose Oral Gel 15 Gram(s) Oral once PRN Blood Glucose LESS THAN 70 milliGRAM(s)/deciliter  sodium chloride 0.9% Bolus. 100 milliLiter(s) IV Bolus every 5 minutes PRN SBP LESS THAN or EQUAL to 80 mmHg  sodium chloride 0.9% Bolus. 100 milliLiter(s) IV Bolus every 5 minutes PRN SBP LESS THAN or EQUAL to 100 mmHg  sodium chloride 0.9% Bolus. 100 milliLiter(s) IV Bolus every 5 minutes PRN SBP LESS THAN or EQUAL to 100 mmHg

## 2023-07-04 NOTE — PROGRESS NOTE ADULT - SUBJECTIVE AND OBJECTIVE BOX
---------Daily Progress Note-------    History of Present Illness:   62 yo M with  PMH of ICH (2021) s/p trach and PEG, HTN, HLD, T2DM, CAD s/p stents, Recurrent C diff, BIBEMS from [facility] for abnormal labs. Patient non-verbal at baseline - limited history. Per NH chart have a BUN greater than 200 and creatinine of 4.3 on outpatient labs. Outpatient CXR also w/ new L sided pleural effusion. With EMS patient was also found to be in irregular rhythm, no known history of A-fib or a flutter.  In ED patient had borderline BP, and was in Atrial fibrillation. He was started on amiodarone gtt. Labs showed , Cr. 4.1, Hb 6.6. Trops 1.8. He was given 1U PRBC, gastric lavage revealed coffee ground emesis w/no active bleeding. He was started on PPI drip and GI consulted in ED. He was given IV aztreonam and vancomycin.   Patient admitted to ICU for management of Sepsis from UTI, MICHELLE likely prerenal from anemia and diarrhea and NSTEMI. TTE with EF of 63%, G1DD. NSTEMI stable, cardiology recommended medical management with aspirin and statin. Found to have Candiduria s/p fluconzaole and ceftolozane-tazobactam, DKA s/p insulin drip (currently on glargine 10units at bedtime with FS in 130s-170s), acute rental failure with urine production s/p R IJ U-dall placed on 6/1, hyperkalemia requiring HD and Lokelma, acute anemia requiring multiple transfusions (most recent 6/28, Hgb 6.5->7.8). Nephrology continuing to follow, patient to receive TDC     24H events:    Patient is a 63y old Male who presents with a chief complaint of Abnormal labs (01 Jul 2023 07:55)    Primary diagnosis of MICHELLE (acute kidney injury)      Today is hospital day 35d. This morning patient was seen and examined at bedside, resting comfortably in bed.    No acute or major events overnight.    Code Status: DNR    Family Communication: Called wife, updated on likely TDC placement next week     PAST MEDICAL & SURGICAL HISTORY  HTN (hypertension)    Diabetes mellitus    H/O intracranial hemorrhage    H/O tracheostomy    PEG (percutaneous endoscopic gastrostomy) status    Hyperlipidemia      SOCIAL HISTORY:  Social History:      ALLERGIES:  penicillin (Other)    MEDICATIONS:  MEDICATIONS  (STANDING):  acetylcysteine 20%  Inhalation 4 milliLiter(s) Inhalation every 6 hours  albuterol/ipratropium for Nebulization 3 milliLiter(s) Nebulizer every 6 hours  aMIOdarone    Tablet   Oral   aMIOdarone    Tablet 200 milliGRAM(s) Oral daily  aspirin  chewable 81 milliGRAM(s) Oral daily  atorvastatin 80 milliGRAM(s) Oral at bedtime  buMETAnide Injectable 2 milliGRAM(s) IV Push every 12 hours  chlorhexidine 0.12% Liquid 15 milliLiter(s) Oral Mucosa two times a day  chlorhexidine 2% Cloths 1 Application(s) Topical <User Schedule>  dextrose 5%. 1000 milliLiter(s) (100 mL/Hr) IV Continuous <Continuous>  dextrose 5%. 1000 milliLiter(s) (50 mL/Hr) IV Continuous <Continuous>  dextrose 50% Injectable 25 Gram(s) IV Push once  dextrose 50% Injectable 25 Gram(s) IV Push once  dextrose 50% Injectable 12.5 Gram(s) IV Push once  diltiazem    milliGRAM(s) Oral daily  glucagon  Injectable 1 milliGRAM(s) IntraMuscular once  heparin   Injectable 5000 Unit(s) SubCutaneous every 12 hours  insulin glargine Injectable (LANTUS) 10 Unit(s) SubCutaneous at bedtime  insulin lispro (ADMELOG) corrective regimen sliding scale   SubCutaneous three times a day before meals  levETIRAcetam  Solution 500 milliGRAM(s) Oral two times a day  pantoprazole  Injectable 40 milliGRAM(s) IV Push every 12 hours  sodium phosphate 30 milliMole(s)/500 mL IVPB 30 milliMole(s) IV Intermittent once    MEDICATIONS  (PRN):  acetaminophen     Tablet .. 650 milliGRAM(s) Oral every 6 hours PRN Temp greater or equal to 38C (100.4F), Mild Pain (1 - 3)  dextrose Oral Gel 15 Gram(s) Oral once PRN Blood Glucose LESS THAN 70 milliGRAM(s)/deciliter  sodium chloride 0.9% Bolus. 100 milliLiter(s) IV Bolus every 5 minutes PRN SBP LESS THAN or EQUAL to 100 mmHg  sodium chloride 0.9% Bolus. 100 milliLiter(s) IV Bolus every 5 minutes PRN SBP LESS THAN or EQUAL to 100 mmHg  sodium chloride 0.9% Bolus. 100 milliLiter(s) IV Bolus every 5 minutes PRN SBP LESS THAN or EQUAL to 80 mmHg      VITALS:   Vital Signs Last 24 Hrs  T(C): 37.1 (04 Jul 2023 05:00), Max: 37.3 (03 Jul 2023 20:51)  T(F): 98.7 (04 Jul 2023 05:00), Max: 99.1 (03 Jul 2023 20:51)  HR: 85 (04 Jul 2023 05:00) (82 - 85)  BP: 100/57 (04 Jul 2023 05:00) (99/58 - 121/60)  BP(mean): --  RR: 18 (04 Jul 2023 05:00) (18 - 18)  SpO2: 100% (04 Jul 2023 05:00) (100% - 100%)    Parameters below as of 03 Jul 2023 13:00  Patient On (Oxygen Delivery Method): ventilator            PHYSICAL EXAM:  GENERAL: NAD, lying in bed   HEENT: Atraumatic, neck trach in place  LUNGS: B/L air entry, no adventitious breath sounds   HEART: Regular rate and rhythm. Normal s1s2.    ABD: Soft, non-tender, non-distended. Bowel sounds present. Peg in place.   EXT: B/L LE pitting edema   NEURO: AAOX0      LABS:               LABS:                          7.7    11.17 )-----------( 446      ( 03 Jul 2023 12:22 )             24.3     07-03    133<L>  |  95<L>  |  65<HH>  ----------------------------<  120<H>  5.7<H>   |  28  |  2.5<H>    Ca    8.9      03 Jul 2023 12:22  Mg     2.2     07-03    TPro  5.3<L>  /  Alb  2.1<L>  /  TBili  0.3  /  DBili  x   /  AST  20  /  ALT  17  /  AlkPhos  171<H>  07-03    LIVER FUNCTIONS - ( 03 Jul 2023 12:22 )  Alb: 2.1 g/dL / Pro: 5.3 g/dL / ALK PHOS: 171 U/L / ALT: 17 U/L / AST: 20 U/L / GGT: x             Urinalysis Basic - ( 03 Jul 2023 12:22 )    Color: x / Appearance: x / SG: x / pH: x  Gluc: 120 mg/dL / Ketone: x  / Bili: x / Urobili: x   Blood: x / Protein: x / Nitrite: x   Leuk Esterase: x / RBC: x / WBC x   Sq Epi: x / Non Sq Epi: x / Bacteria: x                RADIOLOGY:  < from: US Kidney and Bladder (06.30.23 @ 18:37) >  ACC: [id number] EXAM:  US KIDNEYS AND BLADDER   ORDERED BY: JAI BEHGAL     PROCEDURE DATE:  06/30/2023          INTERPRETATION:  CLINICAL INFORMATION: Acute kidney injury    COMPARISON: Sonogram dated 5/31/2023    TECHNIQUE: Sonography of the kidneysand bladder.    FINDINGS:    Right kidney: 12.6 cm Echogenic.. No hydronephrosis.    Left kidney:  11.7 cm. Echogenic. No hydronephrosis. 3.0 cm cyst.    Urinary bladder: Patient voided prior to exam. Thick-walled bladder with   volume of 130 cc.    IMPRESSION:    Echogenic kidneys compatible with parenchymal disease.    No hydronephrosis.    Thick-walled bladder. Correlate with urinalysis.    --- End of Report ---    < end of copied text >           ---------Daily Progress Note-------    History of Present Illness:   64 yo M with  PMH of ICH (2021) s/p trach and PEG, HTN, HLD, T2DM, CAD s/p stents, Recurrent C diff, BIBEMS from [facility] for abnormal labs. Patient non-verbal at baseline - limited history. Per NH chart have a BUN greater than 200 and creatinine of 4.3 on outpatient labs. Outpatient CXR also w/ new L sided pleural effusion. With EMS patient was also found to be in irregular rhythm, no known history of A-fib or a flutter.  In ED patient had borderline BP, and was in Atrial fibrillation. He was started on amiodarone gtt. Labs showed , Cr. 4.1, Hb 6.6. Trops 1.8. He was given 1U PRBC, gastric lavage revealed coffee ground emesis w/no active bleeding. He was started on PPI drip and GI consulted in ED. He was given IV aztreonam and vancomycin.   Patient admitted to ICU for management of Sepsis from UTI, MICHELLE likely prerenal from anemia and diarrhea and NSTEMI. TTE with EF of 63%, G1DD. NSTEMI stable, cardiology recommended medical management with aspirin and statin. Found to have Candiduria s/p fluconzaole and ceftolozane-tazobactam, DKA s/p insulin drip (currently on glargine 10units at bedtime with FS in 130s-170s), acute rental failure with urine production s/p R IJ U-dall placed on 6/1, hyperkalemia requiring HD and Lokelma, acute anemia requiring multiple transfusions (most recent 6/28, Hgb 6.5->7.8). Nephrology continuing to follow, patient to receive TDC     24H events:    Patient is a 63y old Male who presents with a chief complaint of Abnormal labs (01 Jul 2023 07:55)    Primary diagnosis of MICHELLE (acute kidney injury)      Today is hospital day 35d. This morning patient was seen and examined at bedside, resting comfortably in bed.    No acute or major events overnight.    Code Status: DNR    Family Communication: Called wife, updated on likely TDC placement next week     PAST MEDICAL & SURGICAL HISTORY  HTN (hypertension)    Diabetes mellitus    H/O intracranial hemorrhage    H/O tracheostomy    PEG (percutaneous endoscopic gastrostomy) status    Hyperlipidemia      SOCIAL HISTORY:  Social History:      ALLERGIES:  penicillin (Other)    MEDICATIONS:  MEDICATIONS  (STANDING):  acetylcysteine 20%  Inhalation 4 milliLiter(s) Inhalation every 6 hours  albuterol/ipratropium for Nebulization 3 milliLiter(s) Nebulizer every 6 hours  aMIOdarone    Tablet   Oral   aMIOdarone    Tablet 200 milliGRAM(s) Oral daily  aspirin  chewable 81 milliGRAM(s) Oral daily  atorvastatin 80 milliGRAM(s) Oral at bedtime  buMETAnide Injectable 2 milliGRAM(s) IV Push every 12 hours  chlorhexidine 0.12% Liquid 15 milliLiter(s) Oral Mucosa two times a day  chlorhexidine 2% Cloths 1 Application(s) Topical <User Schedule>  dextrose 5%. 1000 milliLiter(s) (100 mL/Hr) IV Continuous <Continuous>  dextrose 5%. 1000 milliLiter(s) (50 mL/Hr) IV Continuous <Continuous>  dextrose 50% Injectable 25 Gram(s) IV Push once  dextrose 50% Injectable 25 Gram(s) IV Push once  dextrose 50% Injectable 12.5 Gram(s) IV Push once  diltiazem    milliGRAM(s) Oral daily  glucagon  Injectable 1 milliGRAM(s) IntraMuscular once  heparin   Injectable 5000 Unit(s) SubCutaneous every 12 hours  insulin glargine Injectable (LANTUS) 10 Unit(s) SubCutaneous at bedtime  insulin lispro (ADMELOG) corrective regimen sliding scale   SubCutaneous three times a day before meals  levETIRAcetam  Solution 500 milliGRAM(s) Oral two times a day  pantoprazole  Injectable 40 milliGRAM(s) IV Push every 12 hours  sodium phosphate 30 milliMole(s)/500 mL IVPB 30 milliMole(s) IV Intermittent once    MEDICATIONS  (PRN):  acetaminophen     Tablet .. 650 milliGRAM(s) Oral every 6 hours PRN Temp greater or equal to 38C (100.4F), Mild Pain (1 - 3)  dextrose Oral Gel 15 Gram(s) Oral once PRN Blood Glucose LESS THAN 70 milliGRAM(s)/deciliter  sodium chloride 0.9% Bolus. 100 milliLiter(s) IV Bolus every 5 minutes PRN SBP LESS THAN or EQUAL to 100 mmHg  sodium chloride 0.9% Bolus. 100 milliLiter(s) IV Bolus every 5 minutes PRN SBP LESS THAN or EQUAL to 100 mmHg  sodium chloride 0.9% Bolus. 100 milliLiter(s) IV Bolus every 5 minutes PRN SBP LESS THAN or EQUAL to 80 mmHg      VITALS:   Vital Signs Last 24 Hrs  T(C): 37.1 (04 Jul 2023 05:00), Max: 37.3 (03 Jul 2023 20:51)  T(F): 98.7 (04 Jul 2023 05:00), Max: 99.1 (03 Jul 2023 20:51)  HR: 85 (04 Jul 2023 05:00) (82 - 85)  BP: 100/57 (04 Jul 2023 05:00) (99/58 - 121/60)  BP(mean): --  RR: 18 (04 Jul 2023 05:00) (18 - 18)  SpO2: 100% (04 Jul 2023 05:00) (100% - 100%)    Parameters below as of 03 Jul 2023 13:00  Patient On (Oxygen Delivery Method): ventilator            PHYSICAL EXAM:  GENERAL: NAD, lying in bed   HEENT: Atraumatic, neck trach in place  LUNGS: B/L air entry, no adventitious breath sounds   HEART: Regular rate and rhythm. Normal s1s2.    ABD: Soft, non-tender, non-distended. Bowel sounds present. Peg in place.   EXT: B/L LE pitting edema   NEURO: AAOX0  multiple pressures ulcers on the back and buttock      LABS:               LABS:                          7.7    11.17 )-----------( 446      ( 03 Jul 2023 12:22 )             24.3     07-03    133<L>  |  95<L>  |  65<HH>  ----------------------------<  120<H>  5.7<H>   |  28  |  2.5<H>    Ca    8.9      03 Jul 2023 12:22  Mg     2.2     07-03    TPro  5.3<L>  /  Alb  2.1<L>  /  TBili  0.3  /  DBili  x   /  AST  20  /  ALT  17  /  AlkPhos  171<H>  07-03    LIVER FUNCTIONS - ( 03 Jul 2023 12:22 )  Alb: 2.1 g/dL / Pro: 5.3 g/dL / ALK PHOS: 171 U/L / ALT: 17 U/L / AST: 20 U/L / GGT: x             Urinalysis Basic - ( 03 Jul 2023 12:22 )    Color: x / Appearance: x / SG: x / pH: x  Gluc: 120 mg/dL / Ketone: x  / Bili: x / Urobili: x   Blood: x / Protein: x / Nitrite: x   Leuk Esterase: x / RBC: x / WBC x   Sq Epi: x / Non Sq Epi: x / Bacteria: x                RADIOLOGY:  < from: US Kidney and Bladder (06.30.23 @ 18:37) >  ACC: [id number] EXAM:  US KIDNEYS AND BLADDER   ORDERED BY: JAI BEHGAL     PROCEDURE DATE:  06/30/2023          INTERPRETATION:  CLINICAL INFORMATION: Acute kidney injury    COMPARISON: Sonogram dated 5/31/2023    TECHNIQUE: Sonography of the kidneysand bladder.    FINDINGS:    Right kidney: 12.6 cm Echogenic.. No hydronephrosis.    Left kidney:  11.7 cm. Echogenic. No hydronephrosis. 3.0 cm cyst.    Urinary bladder: Patient voided prior to exam. Thick-walled bladder with   volume of 130 cc.    IMPRESSION:    Echogenic kidneys compatible with parenchymal disease.    No hydronephrosis.    Thick-walled bladder. Correlate with urinalysis.    --- End of Report ---    < end of copied text >

## 2023-07-05 NOTE — PROGRESS NOTE ADULT - SUBJECTIVE AND OBJECTIVE BOX
---------Daily Progress Note-------    History of Present Illness:   62 yo M with  PMH of ICH (2021) s/p trach and PEG, HTN, HLD, T2DM, CAD s/p stents, Recurrent C diff, BIBEMS from [facility] for abnormal labs. Patient non-verbal at baseline - limited history. Per NH chart have a BUN greater than 200 and creatinine of 4.3 on outpatient labs. Outpatient CXR also w/ new L sided pleural effusion. With EMS patient was also found to be in irregular rhythm, no known history of A-fib or a flutter.  In ED patient had borderline BP, and was in Atrial fibrillation. He was started on amiodarone gtt. Labs showed , Cr. 4.1, Hb 6.6. Trops 1.8. He was given 1U PRBC, gastric lavage revealed coffee ground emesis w/no active bleeding. He was started on PPI drip and GI consulted in ED. He was given IV aztreonam and vancomycin.   Patient admitted to ICU for management of Sepsis from UTI, MICHELLE likely prerenal from anemia and diarrhea and NSTEMI. TTE with EF of 63%, G1DD. NSTEMI stable, cardiology recommended medical management with aspirin and statin. Found to have Candiduria s/p fluconzaole and ceftolozane-tazobactam, DKA s/p insulin drip (currently on glargine 10units at bedtime with FS in 130s-170s), acute rental failure with urine production s/p R IJ [place] placed on 6/1, hyperkalemia requiring HD and Lokelma, acute anemia requiring multiple transfusions (most recent 6/28, Hgb 6.5->7.8). Nephrology continuing to follow, patient to receive TDC     24H events:    Patient is a 63y old Male who presents with a chief complaint of Abnormal labs (01 Jul 2023 07:55)    Primary diagnosis of MICHELLE (acute kidney injury)      Today is hospital day 36d. This morning patient was seen and examined at bedside, resting comfortably in bed.    No acute or major events overnight.    Code Status: DNR    Family Communication: Called wife, updated on likely TDC placement next week     PAST MEDICAL & SURGICAL HISTORY  HTN (hypertension)    Diabetes mellitus    H/O intracranial hemorrhage    H/O tracheostomy    PEG (percutaneous endoscopic gastrostomy) status    Hyperlipidemia      SOCIAL HISTORY:  Social History:      ALLERGIES:  penicillin (Other)    MEDICATIONS:  MEDICATIONS  (STANDING):  acetylcysteine 20%  Inhalation 4 milliLiter(s) Inhalation every 6 hours  albuterol/ipratropium for Nebulization 3 milliLiter(s) Nebulizer every 6 hours  aMIOdarone    Tablet 200 milliGRAM(s) Oral daily  aMIOdarone    Tablet   Oral   aspirin  chewable 81 milliGRAM(s) Oral daily  atorvastatin 80 milliGRAM(s) Oral at bedtime  buMETAnide Injectable 2 milliGRAM(s) IV Push every 12 hours  chlorhexidine 0.12% Liquid 15 milliLiter(s) Oral Mucosa two times a day  chlorhexidine 2% Cloths 1 Application(s) Topical <User Schedule>  dextrose 5%. 1000 milliLiter(s) (100 mL/Hr) IV Continuous <Continuous>  dextrose 5%. 1000 milliLiter(s) (50 mL/Hr) IV Continuous <Continuous>  dextrose 50% Injectable 25 Gram(s) IV Push once  dextrose 50% Injectable 25 Gram(s) IV Push once  dextrose 50% Injectable 12.5 Gram(s) IV Push once  diltiazem    milliGRAM(s) Oral daily  glucagon  Injectable 1 milliGRAM(s) IntraMuscular once  heparin   Injectable 5000 Unit(s) SubCutaneous every 12 hours  insulin glargine Injectable (LANTUS) 5 Unit(s) SubCutaneous at bedtime  insulin lispro (ADMELOG) corrective regimen sliding scale   SubCutaneous three times a day before meals  levETIRAcetam  Solution 500 milliGRAM(s) Oral two times a day  pantoprazole  Injectable 40 milliGRAM(s) IV Push every 12 hours  sodium phosphate 30 milliMole(s)/500 mL IVPB 30 milliMole(s) IV Intermittent once    MEDICATIONS  (PRN):  acetaminophen     Tablet .. 650 milliGRAM(s) Oral every 6 hours PRN Temp greater or equal to 38C (100.4F), Mild Pain (1 - 3)  dextrose Oral Gel 15 Gram(s) Oral once PRN Blood Glucose LESS THAN 70 milliGRAM(s)/deciliter  sodium chloride 0.9% Bolus. 100 milliLiter(s) IV Bolus every 5 minutes PRN SBP LESS THAN or EQUAL to 100 mmHg  sodium chloride 0.9% Bolus. 100 milliLiter(s) IV Bolus every 5 minutes PRN SBP LESS THAN or EQUAL to 100 mmHg  sodium chloride 0.9% Bolus. 100 milliLiter(s) IV Bolus every 5 minutes PRN SBP LESS THAN or EQUAL to 80 mmHg        VITALS:   Vital Signs Last 24 Hrs  T(C): 36.1 (05 Jul 2023 05:00), Max: 36.8 (04 Jul 2023 19:44)  T(F): 96.9 (05 Jul 2023 05:00), Max: 98.2 (04 Jul 2023 19:44)  HR: 81 (05 Jul 2023 05:12) (78 - 84)  BP: 90/52 (05 Jul 2023 05:00) (90/52 - 114/56)  BP(mean): --  RR: 18 (05 Jul 2023 05:00) (18 - 19)  SpO2: 100% (05 Jul 2023 05:12) (100% - 100%)    Parameters below as of 04 Jul 2023 19:44  Patient On (Oxygen Delivery Method): ventilator      PHYSICAL EXAM:  GENERAL: NAD, lying in bed   HEENT: Atraumatic, neck trach in place  LUNGS: B/L air entry, no adventitious breath sounds   HEART: Regular rate and rhythm. Normal s1s2.    ABD: Soft, non-tender, non-distended. Bowel sounds present. Peg in place.   EXT: B/L LE pitting edema   NEURO: AAOX0  multiple pressures ulcers on the back and buttock      LABS:               LABS:      LABS:                          7.5    13.33 )-----------( 437      ( 04 Jul 2023 08:55 )             23.5     07-03    133<L>  |  95<L>  |  65<HH>  ----------------------------<  120<H>  5.7<H>   |  28  |  2.5<H>    Ca    8.9      03 Jul 2023 12:22  Mg     2.2     07-03    TPro  5.3<L>  /  Alb  2.1<L>  /  TBili  0.3  /  DBili  x   /  AST  20  /  ALT  17  /  AlkPhos  171<H>  07-03    LIVER FUNCTIONS - ( 03 Jul 2023 12:22 )  Alb: 2.1 g/dL / Pro: 5.3 g/dL / ALK PHOS: 171 U/L / ALT: 17 U/L / AST: 20 U/L / GGT: x             Urinalysis Basic - ( 03 Jul 2023 12:22 )    Color: x / Appearance: x / SG: x / pH: x  Gluc: 120 mg/dL / Ketone: x  / Bili: x / Urobili: x   Blood: x / Protein: x / Nitrite: x   Leuk Esterase: x / RBC: x / WBC x   Sq Epi: x / Non Sq Epi: x / Bacteria: x                      RADIOLOGY:  < from: US Kidney and Bladder (06.30.23 @ 18:37) >  ACC: [id number] EXAM:  US KIDNEYS AND BLADDER   ORDERED BY: JAI BEHGAL     PROCEDURE DATE:  06/30/2023          INTERPRETATION:  CLINICAL INFORMATION: Acute kidney injury    COMPARISON: Sonogram dated 5/31/2023    TECHNIQUE: Sonography of the kidneysand bladder.    FINDINGS:    Right kidney: 12.6 cm Echogenic.. No hydronephrosis.    Left kidney:  11.7 cm. Echogenic. No hydronephrosis. 3.0 cm cyst.    Urinary bladder: Patient voided prior to exam. Thick-walled bladder with   volume of 130 cc.    IMPRESSION:    Echogenic kidneys compatible with parenchymal disease.    No hydronephrosis.    Thick-walled bladder. Correlate with urinalysis.    --- End of Report ---    < end of copied text >

## 2023-07-05 NOTE — PROGRESS NOTE ADULT - ATTENDING COMMENTS
a/p  64 yo M with  PMH of ICH (2021) s/p trach and PEG, HTN, HLD, T2DM, CAD s/p stents, Recurrent C diff, BIBEMS from [facility] for abnormal labs. Patient non-verbal at baseline - limited history. Per NH chart  have a BUN greater than 200 and creatinine of 4.3 on outpatient labs. Outpatient CXR also w/ new L sided pleural effusion. In ED, patient was hypotensive with Afib with RVR, found to be in acure renal failure, anemic with elevated trops. He was started on Amiodarone GTT, PPI GTT, gastric lavage with coffee ground secretions, started on broad spectrum abx and admitted to MICU  While in MICU, course complicated by DKA, s/p insulin drip, MICHELLE started on HD, acute anemia s/p PRBC transfusion, NSTEMI, downgraded to SDU -> floor    # Acute Renal Failure, started on HD 6/1, dw nephro , ir consult for tesio   creatinine trend  2.5 (07-03-23 @ 12:22)  2.7 (07-01-23 @ 10:25)  2.7 (06-29-23 @ 13:32)      # diarrhea , neg cdiff and gi pcr, immodium one dose , improved  dw nutrition, pt is not on any stool softners since 6/16    # chronic respiratory failure, vent dependant via trach , trach care, fu pulm   # functional dysphagia, sp peg, peg care   # fever, suspected VAP-improved  #Candiduria with U cx C tropicalis  # Acute anemia  # Afib w/ RVR - now rate controlled # Acute Decompensated CHF  CAD s/p stents  NSTEMI, medical management   # DKA, resolved  # Hx ICH   Bedbound from NH   - c/w keppra 500mg BID for seizure prophylaxis  # multiple sacral wounds. sacral and buttock , un-satgeable   wound care nurse to reevulate     # functional paraplegia , hx of ICH, has upper ext contracture total care     #Progress Note Handoff  Pending :  monitor labs, cont HD, plan is Tesio by IR this week   Family discussion: resident updated family   Disposition: SNF when stable   pt is very high risk for complications of vent and hospitalization   time spent 50 min. Patient is a 64 y/o Male  with  PMH of ICH (2021) s/p trach and PEG, HTN, HLD, T2DM, CAD s/p stents, Recurrent C diff, BIBEMS from [facility] for abnormal labs. Patient non-verbal at baseline . Per NH chart  have a BUN greater than 200 and creatinine of 4.3 on outpatient labs. Outpatient CXR also w/ new L sided pleural effusion. In ED, patient was hypotensive with Afib with RVR, found to be in acute renal failure, anemic with elevated trops. He was started on Amiodarone GTT, PPI GTT, gastric lavage with coffee ground secretions, started on broad spectrum abx and admitted to MICU  While in MICU, course complicated by DKA, s/p insulin drip, MICHELLE started on HD, acute anemia s/p PRBC transfusion, NSTEMI, downgraded to SDU -> floor than downgraded to Vent Unit.    # Acute Renal Failure, started on HD 6/1, as per Renal team rec., last HD on 7/4- removed 2.5 L   - daily BMP monitoring, daily weight, daily strict I/O chart  - patient is hypotensive- Bumex transitioned to PO 2 mg once daily to avoid symptomatic hypotension    # Hyperkalemia- s/p Lokelma tx , monitor BMP     # Hypotension- d/c Cardizem, placed holding parameters on Bumex, obtain am serum Cortisol level      # chronic respiratory failure, vent dependant via trach , trach care,  Vent management per Pulm. team   # Chronic dysphagia, sp peg, peg care   #Candiduria with U cx C tropicalis  # Acute anemia  # Afib w/ RVR - now rate controlled # Acute Decompensated CHF  CAD s/p stents  NSTEMI, medical management   # DKA, resolved  # Hx ICH   Bedbound from NH   - c/w keppra 500mg BID for seizure prophylaxis  # multiple sacral wounds. sacral and buttock , further wound care as per Wound care specialist report, frequent body position change, decub. prevention tx.     # hx of ICH, chronic non-verbal , quadriplegia - continue daily care.     # h/o DM 2- low bsfs - d/c lantus, continue bsfs monitoring     DVT proph: Heparin subc. tx.     #Progress Note Handoff: daily BMP , d/c lantus due to low bsfs, monitor VS, for Hypotension d/c Cardizem, decrease the dose of Bumex, obtain am serum cortisol level. HD as per Renal team. Patient remains with very poor overall prognosis , obtain candida auris swab cxs for placement  Family discussion: yes, medical team Disposition: SNF once medically stable    Total time spent to complete patient's bedside assessment, review medical chart, discuss medical plan of care with covering medical team was more than 50 minutes with >50% of time spent face to face with patient, discussion with patient/family and/or coordination of care

## 2023-07-05 NOTE — PROGRESS NOTE ADULT - ASSESSMENT
62 YO F  PMH of ICH (2021) s/p trach and PEG, HTN, HLD, T2DM, CAD s/p stents, Recurrent C diff, BIBEMS from [facility] for abnormal labs.     MICHELLE on CKD 3a - severe azotemia  likely due to GIB / hypotension  Started HD on 6/1/23  Acute anemia d/t UGIB  Troponemia  Lactic acidosis resolved   DKA resolved   Afib     -  HD in AM   -  phos 1.8 not on binders / repeat levels please   -  continue bumex , document UOP   -  h/h noted / blood tx /   -  feed : nepro k noted / lokelma  10 g  q 8 / K noted pre HD   - will need TDC this week / cleared by ID   - very poor prognosis   will follow

## 2023-07-05 NOTE — PROGRESS NOTE ADULT - SUBJECTIVE AND OBJECTIVE BOX
Nephrology progress note    THIS IS AN INCOMPLETE NOTE . FULL NOTE TO FOLLOW SHORTLY    Patient is seen and examined, events over the last 24 h noted .    Allergies:  penicillin (Other)    Hospital Medications:   MEDICATIONS  (STANDING):  acetylcysteine 20%  Inhalation 4 milliLiter(s) Inhalation every 6 hours  albuterol/ipratropium for Nebulization 3 milliLiter(s) Nebulizer every 6 hours  aMIOdarone    Tablet 200 milliGRAM(s) Oral daily  aMIOdarone    Tablet   Oral   aspirin  chewable 81 milliGRAM(s) Oral daily  atorvastatin 80 milliGRAM(s) Oral at bedtime  buMETAnide Injectable 2 milliGRAM(s) IV Push every 12 hours  chlorhexidine 0.12% Liquid 15 milliLiter(s) Oral Mucosa two times a day  chlorhexidine 2% Cloths 1 Application(s) Topical <User Schedule>  dextrose 5%. 1000 milliLiter(s) (100 mL/Hr) IV Continuous <Continuous>  dextrose 5%. 1000 milliLiter(s) (50 mL/Hr) IV Continuous <Continuous>  dextrose 50% Injectable 25 Gram(s) IV Push once  dextrose 50% Injectable 25 Gram(s) IV Push once  dextrose 50% Injectable 12.5 Gram(s) IV Push once  diltiazem    milliGRAM(s) Oral daily  glucagon  Injectable 1 milliGRAM(s) IntraMuscular once  heparin   Injectable 5000 Unit(s) SubCutaneous every 12 hours  insulin glargine Injectable (LANTUS) 5 Unit(s) SubCutaneous at bedtime  insulin lispro (ADMELOG) corrective regimen sliding scale   SubCutaneous three times a day before meals  levETIRAcetam  Solution 500 milliGRAM(s) Oral two times a day  pantoprazole  Injectable 40 milliGRAM(s) IV Push every 12 hours  sodium phosphate 30 milliMole(s)/500 mL IVPB 30 milliMole(s) IV Intermittent once        VITALS:  T(F): 96.9 (07-05-23 @ 05:00), Max: 98.2 (07-04-23 @ 19:44)  HR: 81 (07-05-23 @ 05:12)  BP: 90/52 (07-05-23 @ 05:00)  RR: 18 (07-05-23 @ 05:00)  SpO2: 100% (07-05-23 @ 05:12)  Wt(kg): --    07-03 @ 07:01 - 07-04 @ 07:00  --------------------------------------------------------  IN: 870 mL / OUT: 0 mL / NET: 870 mL    07-04 @ 07:01 - 07-05 @ 07:00  --------------------------------------------------------  IN: 1305 mL / OUT: 1500 mL / NET: -195 mL          PHYSICAL EXAM:  Constitutional: NAD  HEENT: anicteric sclera, oropharynx clear, MMM  Neck: No JVD  Respiratory: CTAB, no wheezes, rales or rhonchi  Cardiovascular: S1, S2, RRR  Gastrointestinal: BS+, soft, NT/ND  Extremities: No cyanosis or clubbing. No peripheral edema  :  No teixeira.   Skin: No rashes    LABS:  07-03    133<L>  |  95<L>  |  65<HH>  ----------------------------<  120<H>  5.7<H>   |  28  |  2.5<H>    Ca    8.9      03 Jul 2023 12:22  Mg     2.2     07-03    TPro  5.3<L>  /  Alb  2.1<L>  /  TBili  0.3  /  DBili      /  AST  20  /  ALT  17  /  AlkPhos  171<H>  07-03                          7.9    12.40 )-----------( 379      ( 05 Jul 2023 08:24 )             25.1       Urine Studies:  Urinalysis Basic - ( 03 Jul 2023 12:22 )    Color:  / Appearance:  / SG:  / pH:   Gluc: 120 mg/dL / Ketone:   / Bili:  / Urobili:    Blood:  / Protein:  / Nitrite:    Leuk Esterase:  / RBC:  / WBC    Sq Epi:  / Non Sq Epi:  / Bacteria:           Iron 51, TIBC 111, %sat 46      [06-06-23 @ 10:40]  Ferritin 1956      [06-06-23 @ 10:40]  Vitamin D (25OH) 70      [06-03-23 @ 06:50]    HBsAb <3.0      [06-01-23 @ 22:50]  HBsAb Nonreact      [06-01-23 @ 22:50]  HBsAg Nonreact      [06-01-23 @ 22:50]  HBcAb Nonreact      [06-01-23 @ 22:50]  HCV 0.09, Nonreact      [11-05-21 @ 09:04]  HIV Nonreact      [06-08-23 @ 16:00]  HIV Nonreact      [06-08-23 @ 12:20]        RADIOLOGY & ADDITIONAL STUDIES:   Nephrology progress note  Patient is seen and examined, events over the last 24 h noted .  lying in bed     Allergies:  penicillin (Other)    Hospital Medications:   MEDICATIONS  (STANDING):  acetylcysteine 20%  Inhalation 4 milliLiter(s) Inhalation every 6 hours  albuterol/ipratropium for Nebulization 3 milliLiter(s) Nebulizer every 6 hours  aMIOdarone    Tablet 200 milliGRAM(s) Oral daily  aMIOdarone    Tablet   Oral   aspirin  chewable 81 milliGRAM(s) Oral daily  atorvastatin 80 milliGRAM(s) Oral at bedtime  buMETAnide Injectable 2 milliGRAM(s) IV Push every 12 hours  diltiazem    milliGRAM(s) Oral daily  glucagon  Injectable 1 milliGRAM(s) IntraMuscular once  heparin   Injectable 5000 Unit(s) SubCutaneous every 12 hours  insulin glargine Injectable (LANTUS) 5 Unit(s) SubCutaneous at bedtime  insulin lispro (ADMELOG) corrective regimen sliding scale   SubCutaneous three times a day before meals  levETIRAcetam  Solution 500 milliGRAM(s) Oral two times a day  pantoprazole  Injectable 40 milliGRAM(s) IV Push every 12 hours  sodium phosphate 30 milliMole(s)/500 mL IVPB 30 milliMole(s) IV Intermittent once        VITALS:  T(F): 96.9 (07-05-23 @ 05:00), Max: 98.2 (07-04-23 @ 19:44)  HR: 81 (07-05-23 @ 05:12)  BP: 90/52 (07-05-23 @ 05:00)  RR: 18 (07-05-23 @ 05:00)  SpO2: 100% (07-05-23 @ 05:12)      07-03 @ 07:01  -  07-04 @ 07:00  --------------------------------------------------------  IN: 870 mL / OUT: 0 mL / NET: 870 mL    07-04 @ 07:01  -  07-05 @ 07:00  --------------------------------------------------------  IN: 1305 mL / OUT: 1500 mL / NET: -195 mL          PHYSICAL EXAM:  Constitutional: trached on MV   Respiratory: CTAB, no wheezes, rales or rhonchi  Cardiovascular: S1, S2, RRR  Gastrointestinal: BS+, soft, NT/ND  Extremities: No cyanosis or clubbing. No peripheral edema  Skin: No rashes    LABS:      07-03    133<L>  |  95<L>  |  65<HH>  ----------------------------<  120<H>  5.7<H>   |  28  |  2.5<H>    Ca    8.9      03 Jul 2023 12:22  Mg     2.2     07-03    TPro  5.3<L>  /  Alb  2.1<L>  /  TBili  0.3  /  DBili      /  AST  20  /  ALT  17  /  AlkPhos  171<H>  07-03                          7.9    12.40 )-----------( 379      ( 05 Jul 2023 08:24 )             25.1       Urine Studies:  Urinalysis Basic - ( 03 Jul 2023 12:22 )    Color:  / Appearance:  / SG:  / pH:   Gluc: 120 mg/dL / Ketone:   / Bili:  / Urobili:    Blood:  / Protein:  / Nitrite:    Leuk Esterase:  / RBC:  / WBC    Sq Epi:  / Non Sq Epi:  / Bacteria:           Iron 51, TIBC 111, %sat 46      [06-06-23 @ 10:40]  Ferritin 1956      [06-06-23 @ 10:40]  Vitamin D (25OH) 70      [06-03-23 @ 06:50]    HBsAb <3.0      [06-01-23 @ 22:50]  HBsAb Nonreact      [06-01-23 @ 22:50]  HBsAg Nonreact      [06-01-23 @ 22:50]  HBcAb Nonreact      [06-01-23 @ 22:50]  HCV 0.09, Nonreact      [11-05-21 @ 09:04]  HIV Nonreact      [06-08-23 @ 16:00]  HIV Nonreact      [06-08-23 @ 12:20]        RADIOLOGY & ADDITIONAL STUDIES:

## 2023-07-05 NOTE — PROGRESS NOTE ADULT - ASSESSMENT
62 yo M with  PMH of ICH (2021) s/p trach and PEG, HTN, HLD, T2DM, CAD s/p stents, Recurrent C diff, BIBEMS from [facility] for abnormal labs. Patient non-verbal at baseline - limited history. Per NH chart  have a BUN greater than 200 and creatinine of 4.3 on outpatient labs. Outpatient CXR also w/ new L sided pleural effusion. In ED, patient was hypotensive with Afib with RVR, found to be in acure renal failure, anemic with elevated trops. He was started on Amiodarone GTT, PPI GTT, gastric lavage with coffee ground secretions, started on broad spectrum abx and admitted to MICU  While in MICU, course complicated by DKA, s/p insulin drip, MICHELLE started on HD, acute anemia s/p PRBC transfusion, NSTEMI, downgraded to SDU -> floor    # Acute Renal Failure, started on HD 6/1  -Patient cleared by ID for TDC placement. Pending IR  # Mild Hyperkalemia  # Fluid Overload- Improving- c/w Bumex and HD  # HAGMA- resolved  - non oliguric, RBUS without hydro   - started on HD 6/1 via R IJ [place]   - Nephro on board- cw HD  - s/p Midodrine- BP better, s/p Sodium Bicarb- acidosis improved  - TTE 6/1 - EF 63%, GIDD   - cw Lokelma and HD for high K- May need long term standing lokelma  -Bladder US: thick-walled bladder, no hydronephrosis, parenchymal disease      # Diarrhea   GI PCR -ve, C.diff -ve   cont dignishield     # chronic respiratory failure,   vent dependant via trach   trach care    # functional dysphagia,   sp peg   peg care     # Candiduria with U cx C tropicalis  # MDRO UTI and PNA-  s/p Fluconazole  s/p Ceftolozane-tazobactam    # Acute anemia  gatric lavage negative   resolved s/p Multiple transfusion    # Hyponatremia-resolved  -likely hypervolemic  -c/w bumex 2mg iv q12hr and HD per nephro    # Afib w/ RVR -   # Acute Decompensated CHF  # CAD s/p stents  # NSTEMI type 2  -now rate controlled- c/w Amio and Cardizem  -CHF resolved s/p diuresis  -Seen by cardio 5/31, recommend c/w asa and stating, medical management for NSTEMI    # DKA, resolved  -s/p insulin drip, now on basal/bolus and tube feeds  Monitor FS     # Hx ICH  # multiple sacral wounds  # functional paraplegia ,   Bedbound from NH  has upper ext contracture total care   c/w keppra 500mg BID for seizure prophylaxis  sacral and buttock wounds , un-stageable  Care instructions per wound care:   Clean sacrum , B/L buttock and upper back wound with vashe wound cleanser, pat dry then apply hydrogel with moist vashe guaze dressing  Q 12 hours   Continue skin barrier  to R heel - monitor progression  Pressure  injury  preventive  measures  Skin  and incontinence care         #Misc  -DVT prophylaxis: Heparin SQ  -GI prophylaxis: Protonix IV BID  -Diet: Tube feed  -Code status: DNR  -Activity: IAT  -Dispo: Vent unit- SNF when stable    Pending Nephro for long term HD then DC    #Hand off  -ID cleared the patient for TDC placement. F/u with IR  -f/u BMP

## 2023-07-06 NOTE — PROGRESS NOTE ADULT - ASSESSMENT
64 YO F  PMH of ICH (2021) s/p trach and PEG, HTN, HLD, T2DM, CAD s/p stents, Recurrent C diff, BIBEMS from [facility] for abnormal labs.   MICHELLE on CKD 3a - severe azotemia  likely due to GIB / hypotension  Started HD on 6/1/23  Acute anemia d/t UGIB  Troponemia  Lactic acidosis resolved   DKA resolved   Afib   -  HD today standard bath uf 2 liters as tolerated   -  phos 2.1 not on binders / repeat levels please   -  continue bumex / increase to q 12 , document UOP   -  h/h noted / tx if h <7 / sat elevated / will start PIYUSH iwth HD   -  feed : blake juarez noted / lokelma 10 on non dialysis days   - will need TDC this week   - very poor prognosis   will follow

## 2023-07-06 NOTE — PROGRESS NOTE ADULT - ASSESSMENT
IMPRESSION:    Sepsis present on admission resolved  Candida UTI sp tx  Pseudomonas PNA sp abx  anemia  left side atelectasis improved  Bilateral pleural effusions likely volume overload   MICHELLE on CKD III on dialysis   NSTEMI likely type 2  Hyponatremia resolved   Paroxysmal Afib  Acute on chronic anemia suspected UGI Bleed  Chronic respiratory failure  H/o ICH s/p trach and PEG  H/o CAD      PLAN:    CNS: Avoid CNS depressants     HEENT: Oral and trach care    PULMONARY: HOB 45. Aspiration.  No vent changes.  Goal saturation 92-96%. Vent dependents.  Pulmonary toilet.     CARDIOVASCULAR: Avoid overload.      GI:  Feeding  Bowel regimen     RENAL: trend CMP.  Correct as needed.  Nephrology following. HD per renal     INFECTIOUS DISEASE: ABX per ID.      HEMATOLOGICAL: DVT prophylaxis.   Monitor CBC     ENDOCRINE: Follow up FS. Insulin protocol if needed.    DNR    Poor prognosis

## 2023-07-06 NOTE — PROGRESS NOTE ADULT - SUBJECTIVE AND OBJECTIVE BOX
seen and examined  24 h events noted         PAST HISTORY  --------------------------------------------------------------------------------  No significant changes to PMH, PSH, FHx, SHx, unless otherwise noted    ALLERGIES & MEDICATIONS  --------------------------------------------------------------------------------  Allergies    penicillin (Other)    Intolerances      Standing Inpatient Medications  albuterol/ipratropium for Nebulization 3 milliLiter(s) Nebulizer every 6 hours  aMIOdarone    Tablet   Oral   aMIOdarone    Tablet 200 milliGRAM(s) Oral daily  aspirin  chewable 81 milliGRAM(s) Oral daily  atorvastatin 80 milliGRAM(s) Oral at bedtime  buMETAnide 2 milliGRAM(s) Oral daily  chlorhexidine 0.12% Liquid 15 milliLiter(s) Oral Mucosa two times a day  chlorhexidine 2% Cloths 1 Application(s) Topical <User Schedule>  dextrose 5%. 1000 milliLiter(s) IV Continuous <Continuous>  dextrose 5%. 1000 milliLiter(s) IV Continuous <Continuous>  dextrose 50% Injectable 25 Gram(s) IV Push once  dextrose 50% Injectable 25 Gram(s) IV Push once  dextrose 50% Injectable 12.5 Gram(s) IV Push once  glucagon  Injectable 1 milliGRAM(s) IntraMuscular once  heparin   Injectable 5000 Unit(s) SubCutaneous every 12 hours  insulin lispro (ADMELOG) corrective regimen sliding scale   SubCutaneous three times a day before meals  levETIRAcetam  Solution 500 milliGRAM(s) Enteral Tube two times a day  pantoprazole    Tablet 40 milliGRAM(s) Oral two times a day    PRN Inpatient Medications  acetaminophen     Tablet .. 650 milliGRAM(s) Oral every 6 hours PRN  dextrose Oral Gel 15 Gram(s) Oral once PRN  sodium chloride 0.9% Bolus. 100 milliLiter(s) IV Bolus every 5 minutes PRN  sodium chloride 0.9% Bolus. 100 milliLiter(s) IV Bolus every 5 minutes PRN  sodium chloride 0.9% Bolus. 100 milliLiter(s) IV Bolus every 5 minutes PRN      VITALS/PHYSICAL EXAM  --------------------------------------------------------------------------------  T(C): 36.4 (07-06-23 @ 05:00), Max: 36.4 (07-06-23 @ 05:00)  HR: 85 (07-06-23 @ 05:00) (80 - 89)  BP: 95/53 (07-06-23 @ 05:00) (94/58 - 96/56)  RR: 18 (07-06-23 @ 05:00) (18 - 18)  SpO2: 100% (07-06-23 @ 05:00) (100% - 100%)  Wt(kg): --        07-05-23 @ 07:01  -  07-06-23 @ 07:00  --------------------------------------------------------  IN: 1305 mL / OUT: 0 mL / NET: 1305 mL      Physical Exam:  	Gen: trach/vent   	Pulm:  B/L aline   	CV:  S1S2; no rub  	Abd: +distended  	LE: edema  	Vascular access:    LABS/STUDIES  --------------------------------------------------------------------------------              7.9    12.40 >-----------<  379      [07-05-23 @ 08:24]              25.1     137  |  100  |  51  ----------------------------<  93      [07-05-23 @ 12:07]  5.4   |  25  |  2.3        Ca     8.9     [07-05-23 @ 12:07]      Phos  2.1     [07-05-23 @ 12:07]    TPro  5.5  /  Alb  1.9  /  TBili  0.3  /  DBili  x   /  AST  25  /  ALT  17  /  AlkPhos  162  [07-05-23 @ 12:07]    Creatinine Trend:  SCr 2.3 [07-05 @ 12:07]  SCr 2.5 [07-03 @ 12:22]  SCr 2.7 [07-01 @ 10:25]  SCr 2.7 [06-29 @ 13:32]  SCr 2.5 [06-28 @ 12:00]    Urinalysis - [07-05-23 @ 12:07]      Color  / Appearance  / SG  / pH       Gluc 93 / Ketone   / Bili  / Urobili        Blood  / Protein  / Leuk Est  / Nitrite       RBC  / WBC  / Hyaline  / Gran  / Sq Epi  / Non Sq Epi  / Bacteria       Iron 51, TIBC 111, %sat 46      [06-06-23 @ 10:40]  Ferritin 1956      [06-06-23 @ 10:40]  Vitamin D (25OH) 70      [06-03-23 @ 06:50]    HIV Nonreact      [06-08-23 @ 16:00]  HIV Nonreact      [06-08-23 @ 12:20]

## 2023-07-06 NOTE — PROGRESS NOTE ADULT - SUBJECTIVE AND OBJECTIVE BOX
NUTRITION SUPPORT TEAM  -  PROGRESS NOTE     remains vented, + trach   abd soft, + GT  + sacral ulcer  pt is  receiving hydrolyzed enteral Rx)  medical f/u noted - diarrhea reported, + rectal tube placed  on HD   renal f/u noted  REVIEW OF SYSTEMS:  Negative except as noted above.     VITALS:  T(F): 97.5 (07-06 @ 05:00), Max: 97.5 (07-06 @ 05:00)  HR: 93 (07-06 @ 16:00) (77 - 93)  BP: 105/51 (07-06 @ 14:13) (95/53 - 126/55)  RR: 17 (07-06 @ 11:50) (17 - 18)  SpO2: 100% (07-06 @ 16:00) (100% - 100%)  Mode: AC/ CMV (Assist Control/ Continuous Mandatory Ventilation)  RR (machine): 14  TV (machine): 450  FiO2: 40  PEEP: 8  ITime: 1  MAP: 11  PIP: 28    HEIGHT/WEIGHT/BMI:   Height (cm): 170.2 (06-01), 180.3 (10-04)  Weight (kg): 93.3 (06-05), 66.4 (10-04)  BMI (kg/m2): 32.2 (06-05), 22.9 (06-01), 20.4 (10-04)  07-05-23 @ 07:01  -  07-06-23 @ 07:00  --------------------------------------------------------  IN:    Enteral Tube Flush: 180 mL    Peptamen A.F.: 1125 mL  Total IN: 1305 mL    OUT:  Total OUT: 0 mL    Total NET: 1305 mL        MEDICATIONS:   acetaminophen     Tablet .. 650 milliGRAM(s) Oral every 6 hours PRN  albuterol/ipratropium for Nebulization 3 milliLiter(s) Nebulizer every 6 hours  aMIOdarone    Tablet   Oral   aMIOdarone    Tablet 200 milliGRAM(s) Oral daily  aspirin  chewable 81 milliGRAM(s) Oral daily  atorvastatin 80 milliGRAM(s) Oral at bedtime  buMETAnide 2 milliGRAM(s) Oral daily  chlorhexidine 0.12% Liquid 15 milliLiter(s) Oral Mucosa two times a day  chlorhexidine 2% Cloths 1 Application(s) Topical <User Schedule>  glucagon  Injectable 1 milliGRAM(s) IntraMuscular once  heparin   Injectable 5000 Unit(s) SubCutaneous every 12 hours  insulin lispro (ADMELOG) corrective regimen sliding scale   SubCutaneous three times a day before meals  levETIRAcetam  Solution 500 milliGRAM(s) Enteral Tube two times a day  pantoprazole    Tablet 40 milliGRAM(s) Oral two times a day  sodium chloride 0.9% Bolus. 100 milliLiter(s) IV Bolus every 5 minutes PRN  sodium chloride 0.9% Bolus. 100 milliLiter(s) IV Bolus every 5 minutes PRN  sodium chloride 0.9% Bolus. 100 milliLiter(s) IV Bolus every 5 minutes PRN    LABS:                         7.9    12.40 )-----------( 379      ( 05 Jul 2023 08:24 )             25.1     137  |  100  |  51<H>  ----------------------------<  93          (07-05-23 @ 12:07)  5.4<H>   |  25  |  2.3<H>    Ca    8.9          (07-05-23 @ 12:07)  Phos  2.1         (07-05-23 @ 12:07)  TPro  5.5<L>  /  Alb  1.9<L>  /  TBili  0.3  /  DBili  x   /  AST  25  /  ALT  17  /  AlkPhos  162<H>       07-05-23 @ 12:07  Vitamin D, 25-Hydroxy: 70 ng/mL (06-03 @ 06:50)  Folate: >20.0 ng/mL (06-03 @ 06:50)  Vitamin B12, Serum: 1908 pg/mL (06-06 @ 10:40)  Zinc Level, Plasma: 54 ug/dL (06-03 @ 06:50)  Blood Glucose (Past 24 hours):  120 mg/dL (07-06 @ 14:11)  133 mg/dL (07-06 @ 05:55)  101 mg/dL (07-06 @ 00:08)  109 mg/dL (07-05 @ 21:03)    DIET:   Diet, NPO after Midnight:      NPO Start Date: 06-Jul-2023,   NPO Start Time: 23:59 (07-06-23 @ 10:05) [Active]  Diet, NPO with Tube Feed:   Tube Feeding Modality: Gastrostomy  Peptamen A.F. Formula  Total Volume for 24 Hours (mL): 1500  Bolus  Total Volume of Bolus (mL):  375  Tube Feed Frequency: Every 6 hours   Tube Feed Start Time: 16:00  Bolus Feed Rate (mL per Hour): 500   Bolus Feed Duration (in Hours): 0.75  Free Water Flush Instructions:  flush GT with 30 ml water syringe push before & after each feed (06-02-23 @ 15:58) [Active]

## 2023-07-06 NOTE — PROGRESS NOTE ADULT - ASSESSMENT
ASSESSMENT   fluid overload and anasarca  - multiple areas of clean but deep decubiti  - DM  - hyponatremia  - ? DKA on admission  - A fib  - h/o recurrent C diff - cont to have loose brown BMs -  hypophosphatemia  loose BM     SUGGEST:  -  -continue with Peptamen AF      375 ml over 45 min x 4 feeds/d --> 1800 kcal (low % carb) 114 gm protein (whey source), 1200 mg phos, 62 mEq K/d  - check poc glucose prior to each feeding  - f/u phos with pre-HD labs  - limit pre and post feed flushes to 30 ml  - add Susan Stor twice a day  - if c diff negative, add Banatrol via tube 3 times a day - once stool thicker, decrease to 2/d  -pt is receiving an  hydrolyzed formula, so that he can not really malabsorb - and that he has been on multiple antimicrobials since admission

## 2023-07-06 NOTE — PROGRESS NOTE ADULT - ATTENDING COMMENTS
Patient is a 64 y/o Male  with  PMH of ICH (2021) s/p trach and PEG, HTN, HLD, T2DM, CAD s/p stents, Recurrent C diff, BIBEMS from [facility] for abnormal labs. Patient non-verbal at baseline . Per NH chart  have a BUN greater than 200 and creatinine of 4.3 on outpatient labs. Outpatient CXR also w/ new L sided pleural effusion. In ED, patient was hypotensive with Afib with RVR, found to be in acute renal failure, anemic with elevated trops. He was started on Amiodarone GTT, PPI GTT, gastric lavage with coffee ground secretions, started on broad spectrum abx and admitted to MICU  While in MICU, course complicated by DKA, s/p insulin drip, MICHELLE started on HD, acute anemia s/p PRBC transfusion, NSTEMI, downgraded to SDU -> floor than downgraded to Vent Unit.    # Acute Renal Failure, started on HD 6/1, as per Renal team rec., last HD on 7/4- removed 2.5 L   - daily BMP monitoring, daily weight, daily strict I/O chart  - patient is hypotensive- Bumex transitioned to PO 2 mg once daily to avoid symptomatic hypotension    # Hyperkalemia- s/p Lokelma tx , monitor BMP     # Hypotension- d/c Cardizem, placed holding parameters on Bumex, obtain am serum Cortisol level      # chronic respiratory failure, vent dependant via trach , trach care,  Vent management per Pulm. team   # Chronic dysphagia, sp peg, peg care   #Candiduria with U cx C tropicalis  # Acute anemia  # Afib w/ RVR - now rate controlled # Acute Decompensated CHF  CAD s/p stents  NSTEMI, medical management   # DKA, resolved  # Hx ICH   Bedbound from NH   - c/w keppra 500mg BID for seizure prophylaxis  # multiple sacral wounds. sacral and buttock , further wound care as per Wound care specialist report, frequent body position change, decub. prevention tx.     # hx of ICH, chronic non-verbal , quadriplegia - continue daily care.     # h/o DM 2- low bsfs - d/c lantus, continue bsfs monitoring     DVT proph: Heparin subc. tx.     #Progress Note Handoff: daily BMP , bsfs,  monitor VS, f/up cortisol level. HD as per Renal team. Patient remains with very poor overall prognosis , pending  candida auris swab cxs for placement  Family discussion: yes, medical team Disposition: SNF once medically stable    Total time spent to complete patient's bedside assessment, review medical chart, discuss medical plan of care with covering medical team was more than 35 minutes with >50% of time spent face to face with patient, discussion with patient/family and/or coordination of care.

## 2023-07-06 NOTE — CONSULT NOTE ADULT - SUBJECTIVE AND OBJECTIVE BOX
INTERVENTIONAL RADIOLOGY CONSULT:     Procedure Requested:     HPI:  64 yo M with  PMH of ICH (2021) s/p trach and PEG, HTN, HLD, T2DM, CAD s/p stents, Recurrent C diff, BIBEMS from [facility] for abnormal labs. Patient non-verbal at baseline - limited history. Per NH chart  have a BUN greater than 200 and creatinine of 4.3 on outpatient labs. Outpatient CXR also w/ new L sided pleural effusion. With EMS patient was also found to be in irregular rhythm, no known history of A-fib or a flutter.  In ED patient had borderline BP, and was in Atrial fibrillation. He was started on amiodarone gtt. Labs showed , Cr. 4.1, Hb 6.6. Trops 1.8. He was given 1U PRBC, gastric lavage revealed coffee ground emesis w/no active bleeding. He was started on PPI drip and GI consulted in ED. He was given IV aztreonam and vancomycin.   Patient being admitted to ICU for management of Sepsis likely from UTI, MICHELLE likely prerenal and NSTEMI.        (30 May 2023 23:39)      PAST MEDICAL & SURGICAL HISTORY:  HTN (hypertension)      Diabetes mellitus      H/O intracranial hemorrhage      H/O tracheostomy      PEG (percutaneous endoscopic gastrostomy) status      Hyperlipidemia          MEDICATIONS  (STANDING):  albuterol/ipratropium for Nebulization 3 milliLiter(s) Nebulizer every 6 hours  aMIOdarone    Tablet 200 milliGRAM(s) Oral daily  aMIOdarone    Tablet   Oral   aspirin  chewable 81 milliGRAM(s) Oral daily  atorvastatin 80 milliGRAM(s) Oral at bedtime  buMETAnide 2 milliGRAM(s) Oral daily  chlorhexidine 0.12% Liquid 15 milliLiter(s) Oral Mucosa two times a day  chlorhexidine 2% Cloths 1 Application(s) Topical <User Schedule>  dextrose 5%. 1000 milliLiter(s) (100 mL/Hr) IV Continuous <Continuous>  dextrose 5%. 1000 milliLiter(s) (50 mL/Hr) IV Continuous <Continuous>  dextrose 50% Injectable 25 Gram(s) IV Push once  dextrose 50% Injectable 25 Gram(s) IV Push once  dextrose 50% Injectable 12.5 Gram(s) IV Push once  glucagon  Injectable 1 milliGRAM(s) IntraMuscular once  heparin   Injectable 5000 Unit(s) SubCutaneous every 12 hours  insulin lispro (ADMELOG) corrective regimen sliding scale   SubCutaneous three times a day before meals  levETIRAcetam  Solution 500 milliGRAM(s) Enteral Tube two times a day  pantoprazole    Tablet 40 milliGRAM(s) Oral two times a day    MEDICATIONS  (PRN):  acetaminophen     Tablet .. 650 milliGRAM(s) Oral every 6 hours PRN Temp greater or equal to 38C (100.4F), Mild Pain (1 - 3)  dextrose Oral Gel 15 Gram(s) Oral once PRN Blood Glucose LESS THAN 70 milliGRAM(s)/deciliter  sodium chloride 0.9% Bolus. 100 milliLiter(s) IV Bolus every 5 minutes PRN SBP LESS THAN or EQUAL to 100 mmHg  sodium chloride 0.9% Bolus. 100 milliLiter(s) IV Bolus every 5 minutes PRN SBP LESS THAN or EQUAL to 80 mmHg  sodium chloride 0.9% Bolus. 100 milliLiter(s) IV Bolus every 5 minutes PRN SBP LESS THAN or EQUAL to 100 mmHg      Allergies    penicillin (Other)    Intolerances          FAMILY HISTORY:      Physical Exam:   Vital Signs Last 24 Hrs  T(C): 36.4 (06 Jul 2023 05:00), Max: 36.4 (06 Jul 2023 05:00)  T(F): 97.5 (06 Jul 2023 05:00), Max: 97.5 (06 Jul 2023 05:00)  HR: 81 (06 Jul 2023 14:13) (77 - 90)  BP: 105/51 (06 Jul 2023 14:13) (95/53 - 126/55)  BP(mean): --  RR: 17 (06 Jul 2023 11:50) (17 - 18)  SpO2: 100% (06 Jul 2023 14:13) (100% - 100%)    Parameters below as of 06 Jul 2023 14:13  Patient On (Oxygen Delivery Method): ventilator          Labs:                         7.9    12.40 )-----------( 379      ( 05 Jul 2023 08:24 )             25.1     07-05    137  |  100  |  51<H>  ----------------------------<  93  5.4<H>   |  25  |  2.3<H>    Ca    8.9      05 Jul 2023 12:07  Phos  2.1     07-05    TPro  5.5<L>  /  Alb  1.9<L>  /  TBili  0.3  /  DBili  x   /  AST  25  /  ALT  17  /  AlkPhos  162<H>  07-05        Pertinent labs:                      7.9    12.40 )-----------( 379      ( 05 Jul 2023 08:24 )             25.1       07-05    137  |  100  |  51<H>  ----------------------------<  93  5.4<H>   |  25  |  2.3<H>    Ca    8.9      05 Jul 2023 12:07  Phos  2.1     07-05    TPro  5.5<L>  /  Alb  1.9<L>  /  TBili  0.3  /  DBili  x   /  AST  25  /  ALT  17  /  AlkPhos  162<H>  07-05          Radiology & Additional Studies:     Radiology imaging reviewed.       ASSESSMENT AND PLAN:    64 y/o Male  with  PMH of ICH (2021) s/p trach and PEG, HTN, HLD, T2DM, CAD s/p stents, Recurrent C diff, BIBEMS from [facility] for abnormal labs.  IR consulted for TDC placement for dialysis.         Plan:   -TDC placement tomorrow (7/7/23)   -NPO at midnight         Thank you for the courtesy of this consult, please call [phone] with any further questions.

## 2023-07-06 NOTE — PROGRESS NOTE ADULT - SUBJECTIVE AND OBJECTIVE BOX
Patient is a 63y old  Male who presents with a chief complaint of Abnormal labs (06 Jul 2023 10:42)        Over Night Events:  On MV.  Off pressors         ROS:     All ROS are negative except HPI         PHYSICAL EXAM    ICU Vital Signs Last 24 Hrs  T(C): 36.4 (06 Jul 2023 05:00), Max: 36.4 (06 Jul 2023 05:00)  T(F): 97.5 (06 Jul 2023 05:00), Max: 97.5 (06 Jul 2023 05:00)  HR: 81 (06 Jul 2023 14:13) (77 - 90)  BP: 105/51 (06 Jul 2023 14:13) (95/53 - 126/55)  BP(mean): --  ABP: --  ABP(mean): --  RR: 17 (06 Jul 2023 11:50) (17 - 18)  SpO2: 100% (06 Jul 2023 14:13) (100% - 100%)    O2 Parameters below as of 06 Jul 2023 14:13  Patient On (Oxygen Delivery Method): ventilator            CONSTITUTIONAL:  In  NAD    ENT:   Airway patent,   Mouth with normal mucosa.   Trach     EYES:   Pupils equal,   Round and reactive to light.    CARDIAC:   Normal rate,   Irregular rhythm.      RESPIRATORY:   No wheezing  Bilateral BS  Normal chest expansion  Not tachypneic,  No use of accessory muscles    GASTROINTESTINAL:  Abdomen soft,   Non-tender,   No guarding,   + BS    MUSCULOSKELETAL:   Contracted   No clubbing, cyanosis    NEUROLOGICAL:   Does not follow commands    SKIN:   Skin normal color for race,   No evidence of rash.        07-05-23 @ 07:01  -  07-06-23 @ 07:00  --------------------------------------------------------  IN:    Enteral Tube Flush: 180 mL    Peptamen A.F.: 1125 mL  Total IN: 1305 mL    OUT:  Total OUT: 0 mL    Total NET: 1305 mL      07-06-23 @ 07:01  -  07-06-23 @ 15:52  --------------------------------------------------------  IN:  Total IN: 0 mL    OUT:    Other (mL): 2500 mL  Total OUT: 2500 mL    Total NET: -2500 mL          LABS:                            7.9    12.40 )-----------( 379      ( 05 Jul 2023 08:24 )             25.1                                               07-05    137  |  100  |  51<H>  ----------------------------<  93  5.4<H>   |  25  |  2.3<H>    Ca    8.9      05 Jul 2023 12:07  Phos  2.1     07-05    TPro  5.5<L>  /  Alb  1.9<L>  /  TBili  0.3  /  DBili  x   /  AST  25  /  ALT  17  /  AlkPhos  162<H>  07-05                                             Urinalysis Basic - ( 05 Jul 2023 12:07 )    Color: x / Appearance: x / SG: x / pH: x  Gluc: 93 mg/dL / Ketone: x  / Bili: x / Urobili: x   Blood: x / Protein: x / Nitrite: x   Leuk Esterase: x / RBC: x / WBC x   Sq Epi: x / Non Sq Epi: x / Bacteria: x                                                  LIVER FUNCTIONS - ( 05 Jul 2023 12:07 )  Alb: 1.9 g/dL / Pro: 5.5 g/dL / ALK PHOS: 162 U/L / ALT: 17 U/L / AST: 25 U/L / GGT: x                                                  Culture - Fungal, Other (collected 05 Jul 2023 12:20)  Source: .Other Other, RIGHT AXILLARY SWAB  Preliminary Report (06 Jul 2023 13:24):    Testing in progress                                                   Mode: AC/ CMV (Assist Control/ Continuous Mandatory Ventilation)  RR (machine): 14  TV (machine): 450  FiO2: 40  PEEP: 8  ITime: 0.8  MAP: 11  PIP: 28                                          MEDICATIONS  (STANDING):  albuterol/ipratropium for Nebulization 3 milliLiter(s) Nebulizer every 6 hours  aMIOdarone    Tablet 200 milliGRAM(s) Oral daily  aMIOdarone    Tablet   Oral   aspirin  chewable 81 milliGRAM(s) Oral daily  atorvastatin 80 milliGRAM(s) Oral at bedtime  buMETAnide 2 milliGRAM(s) Oral daily  chlorhexidine 0.12% Liquid 15 milliLiter(s) Oral Mucosa two times a day  chlorhexidine 2% Cloths 1 Application(s) Topical <User Schedule>  dextrose 5%. 1000 milliLiter(s) (100 mL/Hr) IV Continuous <Continuous>  dextrose 5%. 1000 milliLiter(s) (50 mL/Hr) IV Continuous <Continuous>  dextrose 50% Injectable 25 Gram(s) IV Push once  dextrose 50% Injectable 25 Gram(s) IV Push once  dextrose 50% Injectable 12.5 Gram(s) IV Push once  glucagon  Injectable 1 milliGRAM(s) IntraMuscular once  heparin   Injectable 5000 Unit(s) SubCutaneous every 12 hours  insulin lispro (ADMELOG) corrective regimen sliding scale   SubCutaneous three times a day before meals  levETIRAcetam  Solution 500 milliGRAM(s) Enteral Tube two times a day  pantoprazole    Tablet 40 milliGRAM(s) Oral two times a day    MEDICATIONS  (PRN):  acetaminophen     Tablet .. 650 milliGRAM(s) Oral every 6 hours PRN Temp greater or equal to 38C (100.4F), Mild Pain (1 - 3)  dextrose Oral Gel 15 Gram(s) Oral once PRN Blood Glucose LESS THAN 70 milliGRAM(s)/deciliter  sodium chloride 0.9% Bolus. 100 milliLiter(s) IV Bolus every 5 minutes PRN SBP LESS THAN or EQUAL to 100 mmHg  sodium chloride 0.9% Bolus. 100 milliLiter(s) IV Bolus every 5 minutes PRN SBP LESS THAN or EQUAL to 80 mmHg  sodium chloride 0.9% Bolus. 100 milliLiter(s) IV Bolus every 5 minutes PRN SBP LESS THAN or EQUAL to 100 mmHg      New X-rays reviewed:                                                                                  ECHO    CXR interpreted by me:

## 2023-07-06 NOTE — PROGRESS NOTE ADULT - SUBJECTIVE AND OBJECTIVE BOX
---------Daily Progress Note-------    History of Present Illness:   64 yo M with  PMH of ICH (2021) s/p trach and PEG, HTN, HLD, T2DM, CAD s/p stents, Recurrent C diff, BIBEMS from [facility] for abnormal labs. Patient non-verbal at baseline - limited history. Per NH chart have a BUN greater than 200 and creatinine of 4.3 on outpatient labs. Outpatient CXR also w/ new L sided pleural effusion. With EMS patient was also found to be in irregular rhythm, no known history of A-fib or a flutter.  In ED patient had borderline BP, and was in Atrial fibrillation. He was started on amiodarone gtt. Labs showed , Cr. 4.1, Hb 6.6. Trops 1.8. He was given 1U PRBC, gastric lavage revealed coffee ground emesis w/no active bleeding. He was started on PPI drip and GI consulted in ED. He was given IV aztreonam and vancomycin.   Patient admitted to ICU for management of Sepsis from UTI, MICHELLE likely prerenal from anemia and diarrhea and NSTEMI. TTE with EF of 63%, G1DD. NSTEMI stable, cardiology recommended medical management with aspirin and statin. Found to have Candiduria s/p fluconzaole and ceftolozane-tazobactam, DKA s/p insulin drip (currently on glargine 10units at bedtime with FS in 130s-170s), acute rental failure with urine production s/p R IJ U-dall placed on 6/1, hyperkalemia requiring HD and Lokelma, acute anemia requiring multiple transfusions (most recent 6/28, Hgb 6.5->7.8). Nephrology continuing to follow, patient to receive TDC     24H events:    Patient is a 63y old Male who presents with a chief complaint of Abnormal labs (01 Jul 2023 07:55)    Primary diagnosis of MICHELLE (acute kidney injury)      Today is hospital day 37d. This morning patient was seen and examined at bedside, resting comfortably in bed.    No acute or major events overnight.    Code Status: DNR    Family Communication: Called wife, updated on likely TDC placement next week     PAST MEDICAL & SURGICAL HISTORY  HTN (hypertension)    Diabetes mellitus    H/O intracranial hemorrhage    H/O tracheostomy    PEG (percutaneous endoscopic gastrostomy) status    Hyperlipidemia      SOCIAL HISTORY:  Social History:      ALLERGIES:  penicillin (Other)    MEDICATIONS:  MEDICATIONS  (STANDING):  albuterol/ipratropium for Nebulization 3 milliLiter(s) Nebulizer every 6 hours  aMIOdarone    Tablet 200 milliGRAM(s) Oral daily  aMIOdarone    Tablet   Oral   aspirin  chewable 81 milliGRAM(s) Oral daily  atorvastatin 80 milliGRAM(s) Oral at bedtime  buMETAnide 2 milliGRAM(s) Oral daily  chlorhexidine 0.12% Liquid 15 milliLiter(s) Oral Mucosa two times a day  chlorhexidine 2% Cloths 1 Application(s) Topical <User Schedule>  dextrose 5%. 1000 milliLiter(s) (100 mL/Hr) IV Continuous <Continuous>  dextrose 5%. 1000 milliLiter(s) (50 mL/Hr) IV Continuous <Continuous>  dextrose 50% Injectable 25 Gram(s) IV Push once  dextrose 50% Injectable 25 Gram(s) IV Push once  dextrose 50% Injectable 12.5 Gram(s) IV Push once  glucagon  Injectable 1 milliGRAM(s) IntraMuscular once  heparin   Injectable 5000 Unit(s) SubCutaneous every 12 hours  insulin lispro (ADMELOG) corrective regimen sliding scale   SubCutaneous three times a day before meals  levETIRAcetam  Solution 500 milliGRAM(s) Enteral Tube two times a day  pantoprazole    Tablet 40 milliGRAM(s) Oral two times a day    MEDICATIONS  (PRN):  acetaminophen     Tablet .. 650 milliGRAM(s) Oral every 6 hours PRN Temp greater or equal to 38C (100.4F), Mild Pain (1 - 3)  dextrose Oral Gel 15 Gram(s) Oral once PRN Blood Glucose LESS THAN 70 milliGRAM(s)/deciliter  sodium chloride 0.9% Bolus. 100 milliLiter(s) IV Bolus every 5 minutes PRN SBP LESS THAN or EQUAL to 100 mmHg  sodium chloride 0.9% Bolus. 100 milliLiter(s) IV Bolus every 5 minutes PRN SBP LESS THAN or EQUAL to 100 mmHg  sodium chloride 0.9% Bolus. 100 milliLiter(s) IV Bolus every 5 minutes PRN SBP LESS THAN or EQUAL to 80 mmHg          VITALS:   Vital Signs Last 24 Hrs  T(C): 36.4 (06 Jul 2023 05:00), Max: 36.4 (06 Jul 2023 05:00)  T(F): 97.5 (06 Jul 2023 05:00), Max: 97.5 (06 Jul 2023 05:00)  HR: 90 (06 Jul 2023 08:50) (80 - 90)  BP: 126/55 (06 Jul 2023 08:50) (94/58 - 126/55)  BP(mean): --  RR: 17 (06 Jul 2023 08:50) (17 - 18)  SpO2: 100% (06 Jul 2023 08:50) (100% - 100%)    Parameters below as of 06 Jul 2023 08:50  Patient On (Oxygen Delivery Method): ventilator          PHYSICAL EXAM:  GENERAL: NAD, lying in bed   HEENT: Atraumatic, neck trach in place  LUNGS: B/L air entry, no adventitious breath sounds   HEART: Regular rate and rhythm. Normal s1s2.    ABD: Soft, non-tender, non-distended. Bowel sounds present. Peg in place.   EXT: B/L LE pitting edema   NEURO: AAOX0  multiple pressures ulcers on the back and buttock      LABS:             LABS:                          7.9    12.40 )-----------( 379      ( 05 Jul 2023 08:24 )             25.1     07-05    137  |  100  |  51<H>  ----------------------------<  93  5.4<H>   |  25  |  2.3<H>    Ca    8.9      05 Jul 2023 12:07  Phos  2.1     07-05    TPro  5.5<L>  /  Alb  1.9<L>  /  TBili  0.3  /  DBili  x   /  AST  25  /  ALT  17  /  AlkPhos  162<H>  07-05    LIVER FUNCTIONS - ( 05 Jul 2023 12:07 )  Alb: 1.9 g/dL / Pro: 5.5 g/dL / ALK PHOS: 162 U/L / ALT: 17 U/L / AST: 25 U/L / GGT: x             Urinalysis Basic - ( 05 Jul 2023 12:07 )    Color: x / Appearance: x / SG: x / pH: x  Gluc: 93 mg/dL / Ketone: x  / Bili: x / Urobili: x   Blood: x / Protein: x / Nitrite: x   Leuk Esterase: x / RBC: x / WBC x   Sq Epi: x / Non Sq Epi: x / Bacteria: x              RADIOLOGY:  < from: US Kidney and Bladder (06.30.23 @ 18:37) >  ACC: [id number] EXAM:  US KIDNEYS AND BLADDER   ORDERED BY: JAI BEHGAL     PROCEDURE DATE:  06/30/2023          INTERPRETATION:  CLINICAL INFORMATION: Acute kidney injury    COMPARISON: Sonogram dated 5/31/2023    TECHNIQUE: Sonography of the kidneysand bladder.    FINDINGS:    Right kidney: 12.6 cm Echogenic.. No hydronephrosis.    Left kidney:  11.7 cm. Echogenic. No hydronephrosis. 3.0 cm cyst.    Urinary bladder: Patient voided prior to exam. Thick-walled bladder with   volume of 130 cc.    IMPRESSION:    Echogenic kidneys compatible with parenchymal disease.    No hydronephrosis.    Thick-walled bladder. Correlate with urinalysis.    --- End of Report ---    < end of copied text >

## 2023-07-07 NOTE — PROGRESS NOTE ADULT - ASSESSMENT
IMPRESSION:    Sepsis present on admission resolved  Candida UTI sp tx  Pseudomonas PNA sp abx  anemia  left side atelectasis improved  Bilateral pleural effusions likely volume overload   MICHELLE on CKD III on dialysis   NSTEMI likely type 2  Hyponatremia resolved   Paroxysmal Afib  Acute on chronic anemia suspected UGI Bleed  Chronic respiratory failure  H/o ICH s/p trach and PEG  H/o CAD      PLAN:    CNS: Avoid CNS depressants     HEENT: Oral and trach care    PULMONARY: HOB 45. Aspiration.  No vent changes.  Goal saturation 92-96%. Vent dependents.  Pulmonary toilet.     CARDIOVASCULAR: Avoid overload.      GI:  Feeding  Bowel regimen     RENAL: trend CMP.  Correct as needed.  Nephrology following. HD per renal     INFECTIOUS DISEASE: ABX per ID.      HEMATOLOGICAL: DVT prophylaxis.   Monitor CBC.   One Unit PRBC     ENDOCRINE: Follow up FS. Insulin protocol if needed.    DNR    Poor prognosis

## 2023-07-07 NOTE — PROGRESS NOTE ADULT - ASSESSMENT
64 YO F  PMH of ICH (2021) s/p trach and PEG, HTN, HLD, T2DM, CAD s/p stents, Recurrent C diff, BIBEMS from [facility] for abnormal labs.   MICHELLE on CKD 3a - severe azotemia  likely due to GIB / hypotension  Started HD on 6/1/23  Acute anemia d/t UGIB  Troponemia  Lactic acidosis resolved   DKA resolved   Afib   -  HD in AM   -  phos 2.1 not on binders / repeat levels noted stable/ keep off binders   -  continue bumex / increase to q 12 , document UOP   -  h/h noted / tx if h <7 / sat elevated / will start PIYUSH iwth HD in AM   -  feed : blake juarez noted / lokelma 10 on non dialysis days   - will need TDC this week   - very poor prognosis   will follow

## 2023-07-07 NOTE — PROGRESS NOTE ADULT - SUBJECTIVE AND OBJECTIVE BOX
Nephrology progress note    THIS IS AN INCOMPLETE NOTE . FULL NOTE TO FOLLOW SHORTLY    Patient is seen and examined, events over the last 24 h noted .    Allergies:  penicillin (Other)    Hospital Medications:   MEDICATIONS  (STANDING):  albuterol/ipratropium for Nebulization 3 milliLiter(s) Nebulizer every 6 hours  aMIOdarone    Tablet   Oral   aMIOdarone    Tablet 200 milliGRAM(s) Oral daily  aspirin  chewable 81 milliGRAM(s) Oral daily  atorvastatin 80 milliGRAM(s) Oral at bedtime  buMETAnide 2 milliGRAM(s) Oral daily  chlorhexidine 0.12% Liquid 15 milliLiter(s) Oral Mucosa two times a day  chlorhexidine 2% Cloths 1 Application(s) Topical <User Schedule>  dextrose 5%. 1000 milliLiter(s) (100 mL/Hr) IV Continuous <Continuous>  dextrose 5%. 1000 milliLiter(s) (50 mL/Hr) IV Continuous <Continuous>  dextrose 50% Injectable 25 Gram(s) IV Push once  dextrose 50% Injectable 25 Gram(s) IV Push once  dextrose 50% Injectable 12.5 Gram(s) IV Push once  glucagon  Injectable 1 milliGRAM(s) IntraMuscular once  heparin   Injectable 5000 Unit(s) SubCutaneous every 12 hours  insulin lispro (ADMELOG) corrective regimen sliding scale   SubCutaneous three times a day before meals  levETIRAcetam  Solution 500 milliGRAM(s) Enteral Tube two times a day  pantoprazole    Tablet 40 milliGRAM(s) Oral two times a day        VITALS:  T(F): 97.2 (07-07-23 @ 05:10), Max: 97.4 (07-06-23 @ 19:27)  HR: 78 (07-07-23 @ 05:10)  BP: 91/58 (07-07-23 @ 05:10)  RR: 18 (07-07-23 @ 05:10)  SpO2: 100% (07-07-23 @ 05:10)  Wt(kg): --    07-05 @ 07:01  -  07-06 @ 07:00  --------------------------------------------------------  IN: 1305 mL / OUT: 0 mL / NET: 1305 mL    07-06 @ 07:01  -  07-07 @ 07:00  --------------------------------------------------------  IN: 870 mL / OUT: 2500 mL / NET: -1630 mL          PHYSICAL EXAM:  Constitutional: NAD  HEENT: anicteric sclera, oropharynx clear, MMM  Neck: No JVD  Respiratory: CTAB, no wheezes, rales or rhonchi  Cardiovascular: S1, S2, RRR  Gastrointestinal: BS+, soft, NT/ND  Extremities: No cyanosis or clubbing. No peripheral edema  :  No teixeira.   Skin: No rashes    LABS:  07-07    141  |  103  |  38<H>  ----------------------------<  89  4.4   |  28  |  2.0<H>    Ca    8.9      07 Jul 2023 07:07  Phos  2.1     07-05  Mg     2.0     07-07    TPro  5.5<L>  /  Alb  1.9<L>  /  TBili  0.3  /  DBili      /  AST  25  /  ALT  17  /  AlkPhos  162<H>  07-05                          6.9    12.29 )-----------( 424      ( 07 Jul 2023 07:07 )             22.5       Urine Studies:  Urinalysis Basic - ( 07 Jul 2023 07:07 )    Color:  / Appearance:  / SG:  / pH:   Gluc: 89 mg/dL / Ketone:   / Bili:  / Urobili:    Blood:  / Protein:  / Nitrite:    Leuk Esterase:  / RBC:  / WBC    Sq Epi:  / Non Sq Epi:  / Bacteria:           Iron 51, TIBC 111, %sat 46      [06-06-23 @ 10:40]  Ferritin 1956      [06-06-23 @ 10:40]  Vitamin D (25OH) 70      [06-03-23 @ 06:50]    HBsAb <3.0      [06-01-23 @ 22:50]  HBsAb Nonreact      [06-01-23 @ 22:50]  HBsAg Nonreact      [06-01-23 @ 22:50]  HBcAb Nonreact      [06-01-23 @ 22:50]  HCV 0.09, Nonreact      [11-05-21 @ 09:04]  HIV Nonreact      [06-08-23 @ 16:00]  HIV Nonreact      [06-08-23 @ 12:20]        RADIOLOGY & ADDITIONAL STUDIES:   Nephrology progress note    Patient is seen and examined, events over the last 24 h noted .  Lying in bed     Allergies:  penicillin (Other)    Hospital Medications:   MEDICATIONS  (STANDING):  albuterol/ipratropium for Nebulization 3 milliLiter(s) Nebulizer every 6 hours  aMIOdarone    Tablet 200 milliGRAM(s) Oral daily  aspirin  chewable 81 milliGRAM(s) Oral daily  atorvastatin 80 milliGRAM(s) Oral at bedtime  buMETAnide 2 milliGRAM(s) Oral daily  glucagon  Injectable 1 milliGRAM(s) IntraMuscular once  heparin   Injectable 5000 Unit(s) SubCutaneous every 12 hours  insulin lispro (ADMELOG) corrective regimen sliding scale   SubCutaneous three times a day before meals  levETIRAcetam  Solution 500 milliGRAM(s) Enteral Tube two times a day  pantoprazole    Tablet 40 milliGRAM(s) Oral two times a day        VITALS:  T(F): 97.2 (07-07-23 @ 05:10), Max: 97.4 (07-06-23 @ 19:27)  HR: 78 (07-07-23 @ 05:10)  BP: 91/58 (07-07-23 @ 05:10)  RR: 18 (07-07-23 @ 05:10)  SpO2: 100% (07-07-23 @ 05:10)  Wt(kg): --    07-05 @ 07:01  -  07-06 @ 07:00  --------------------------------------------------------  IN: 1305 mL / OUT: 0 mL / NET: 1305 mL    07-06 @ 07:01  -  07-07 @ 07:00  --------------------------------------------------------  IN: 870 mL / OUT: 2500 mL / NET: -1630 mL          PHYSICAL EXAM:  Constitutional: NAD/ trached   Respiratory: CTAB,   Cardiovascular: S1, S2, RRR  Gastrointestinal: BS+, soft, NT/ND  Extremities: No cyanosis or clubbing. No peripheral edema  :  No teixeira.   Skin: No rashes    LABS:  07-07    141  |  103  |  38<H>  ----------------------------<  89  4.4   |  28  |  2.0<H>    Ca    8.9      07 Jul 2023 07:07  Phos  2.1     07-05  Mg     2.0     07-07    TPro  5.5<L>  /  Alb  1.9<L>  /  TBili  0.3  /  DBili      /  AST  25  /  ALT  17  /  AlkPhos  162<H>  07-05                          6.9    12.29 )-----------( 424      ( 07 Jul 2023 07:07 )             22.5       Urine Studies:  Urinalysis Basic - ( 07 Jul 2023 07:07 )    Color:  / Appearance:  / SG:  / pH:   Gluc: 89 mg/dL / Ketone:   / Bili:  / Urobili:    Blood:  / Protein:  / Nitrite:    Leuk Esterase:  / RBC:  / WBC    Sq Epi:  / Non Sq Epi:  / Bacteria:           Iron 51, TIBC 111, %sat 46      [06-06-23 @ 10:40]  Ferritin 1956      [06-06-23 @ 10:40]  Vitamin D (25OH) 70      [06-03-23 @ 06:50]    HBsAb <3.0      [06-01-23 @ 22:50]  HBsAb Nonreact      [06-01-23 @ 22:50]  HBsAg Nonreact      [06-01-23 @ 22:50]  HBcAb Nonreact      [06-01-23 @ 22:50]  HCV 0.09, Nonreact      [11-05-21 @ 09:04]  HIV Nonreact      [06-08-23 @ 16:00]  HIV Nonreact      [06-08-23 @ 12:20]        RADIOLOGY & ADDITIONAL STUDIES:

## 2023-07-07 NOTE — PROGRESS NOTE ADULT - SUBJECTIVE AND OBJECTIVE BOX
VIJAYSTEPHON ALICIA  [facility] 3A (Back) 027 A ([facility] (Back))      Patient was evaluated and examined  by bedside, remains on vent support, peg feedings, no gross bleeding events as per covering nurse report      REVIEW OF SYSTEMS: unable to obtain, patient is non-verbal       T(C): , Max: 36.3 (07-06-23 @ 19:27)  HR: 74 (07-07-23 @ 12:35)  BP: 104/59 (07-07-23 @ 12:35)  RR: 19 (07-07-23 @ 12:35)  SpO2: 100% (07-07-23 @ 12:35)  CAPILLARY BLOOD GLUCOSE      POCT Blood Glucose.: 102 mg/dL (07 Jul 2023 12:03)  POCT Blood Glucose.: 105 mg/dL (07 Jul 2023 08:04)  POCT Blood Glucose.: 102 mg/dL (07 Jul 2023 06:12)  POCT Blood Glucose.: 133 mg/dL (06 Jul 2023 23:57)  POCT Blood Glucose.: 112 mg/dL (06 Jul 2023 17:23)      PHYSICAL EXAM:  General: NAD, unresponsive, patient is laying comfortably in bed  HEENT: AT, NC, trach present  Lungs: mild decreased breath sounds  B/L, no wheezing, no rhonchi  CVS: normal S1, S2, RRR, NO M/G/R  Abdomen: soft, bowel sounds present, non-tender, non-distended, peg present  Extremities: contracted extremities, no edema, no clubbing, no cyanosis, positive peripheral pulses b/l  Neuro: non-verbal, quadriplegic  Skin: sacral wound present      LAB  CBC  Date: 07-07-23 @ 10:40  Mean cell Fqqazbfikk52.7  Mean cell Hemoglobin Conc31.1  Mean cell Volum 95.5  Platelet count-Automate 390  RBC Count 2.22  Red Cell Distrib Width15.1  WBC Count11.51  % Albumin, Urine--  Hematocrit 21.2  Hemoglobin 6.6  CBC  Date: 07-07-23 @ 07:07  Mean cell Dtglctsmsq73.5  Mean cell Hemoglobin Conc30.7  Mean cell Volum 96.2  Platelet count-Automate 424  RBC Count 2.34  Red Cell Distrib Width15.2  WBC Count12.29  % Albumin, Urine--  Hematocrit 22.5  Hemoglobin 6.9        BMP  07-07-23 @ 10:40  Blood Gas Arterial-Calcium,Ionized--  Blood Urea Nitrogen, Serum 40 mg/dL<H> [10 - 20]  Carbon Dioxide, Serum29 mmol/L [17 - 32]  Chloride, Tkeza412 mmol/L [98 - 110]  Creatinie, Serum2.0 mg/dL<H> [0.7 - 1.5]  Glucose, Serum99 mg/dL [70 - 99]  Potassium, Serum4.0 mmol/L [3.5 - 5.0]  Sodium, Serum 139 mmol/L [135 - 146]  BMP  07-07-23 @ 07:07  Blood Gas Arterial-Calcium,Ionized--  Blood Urea Nitrogen, Serum 38 mg/dL<H> [10 - 20]  Carbon Dioxide, Serum28 mmol/L [17 - 32]  Chloride, Idsts585 mmol/L [98 - 110]  Creatinie, Serum2.0 mg/dL<H> [0.7 - 1.5]  Glucose, Serum89 mg/dL [70 - 99]  Potassium, Serum4.4 mmol/L [3.5 - 5.0]  Sodium, Serum 141 mmol/L [135 - 146]  BMP  07-06-23 @ 17:44  Blood Gas Arterial-Calcium,Ionized--  Blood Urea Nitrogen, Serum 33 mg/dL<H> [10 - 20]  Carbon Dioxide, Serum27 mmol/L [17 - 32]  Chloride, Pcgfd088 mmol/L [98 - 110]  Creatinie, Serum1.7 mg/dL<H> [0.7 - 1.5]  Glucose, Dlrbu434 mg/dL<H> [70 - 99]  Potassium, Serum4.0 mmol/L [3.5 - 5.0]  Sodium, Serum 142 mmol/L [135 - 146]          Microbiology:    Culture - Fungal, Other (collected 07-05-23 @ 12:20)  Source: .Other Other, RIGHT AXILLARY SWAB  Preliminary Report (07-06-23 @ 13:24):    Testing in progress    Culture - Blood (collected 06-13-23 @ 19:14)  Source: .Blood Blood  Final Report (06-19-23 @ 02:00):    No Growth Final    Culture - Blood (collected 06-11-23 @ 19:18)  Source: .Blood Blood  Final Report (06-16-23 @ 23:01):    No Growth Final    Culture - Sputum (collected 06-11-23 @ 18:28)  Source: Trach Asp Tracheal Aspirate  Gram Stain (06-12-23 @ 07:16):    Moderate polymorphonuclear leukocytes per low power field    Rare Squamous epithelial cells per low power field    Numerous Gram Negative Rods per oil power field    Moderate Gram Positive Rods per oil power field    Few Gram positive cocci in pairs per oil power field  Final Report (06-13-23 @ 18:59):    Three or more mixed gram negative rods including    Moderate Pseudomonas aeruginosa (Carbapenem Resistant)    Normal Respiratory Susan present  Organism: Pseudomonas aeruginosa (Carbapenem Resistant)  Pseudomonas aeruginosa (Carbapenem Resistant) (06-13-23 @ 18:59)  Organism: Pseudomonas aeruginosa (Carbapenem Resistant) (06-13-23 @ 18:59)      -  Levofloxacin: R >4      -  Tobramycin: S <=2      -  Ceftazidime/Avibactam: S <=4      -  Aztreonam: I 16      -  Gentamicin: S 4      -  Cefepime: I 16      -  Piperacillin/Tazobactam: R >64      -  Ciprofloxacin: R >2      -  Imipenem: R >8      Method Type: TAMIE      -  Meropenem: R >8      -  Ceftazidime: R >16      -  Amikacin: S <=16      -  Ceftolozane/tazobactam: S <=2  Organism: Pseudomonas aeruginosa (Carbapenem Resistant) (06-13-23 @ 18:59)      -  Resistance Gene to Carbapenem: Nondet      Method Type: CarbaR    Culture - Urine (collected 06-11-23 @ 18:25)  Source: Catheterized Catheterized  Final Report (06-14-23 @ 18:40):    >100,000 CFU/ml Pseudomonas aeruginosa (Carbapenem Resistant)  Organism: Pseudomonas aeruginosa (Carbapenem Resistant)  Pseudomonas aeruginosa (Carbapenem Resistant) (06-14-23 @ 18:40)  Organism: Pseudomonas aeruginosa (Carbapenem Resistant) (06-14-23 @ 18:40)      -  Resistance Gene to Carbapenem: Nondet      Method Type: CarbaR  Organism: Pseudomonas aeruginosa (Carbapenem Resistant) (06-14-23 @ 18:40)      -  Levofloxacin: R >4      -  Tobramycin: S <=2      -  Ceftazidime/Avibactam: S 8      -  Aztreonam: R >16      -  Gentamicin: I 8      -  Cefepime: R >16      -  Piperacillin/Tazobactam: R >64      -  Ciprofloxacin: R >2      -  Imipenem: R >8      Method Type: TAMIE      -  Meropenem: R >8      -  Ceftazidime: R >16      -  Amikacin: S <=16      -  Ceftolozane/tazobactam: S <=2        Medications:  acetaminophen     Tablet .. 650 milliGRAM(s) Oral every 6 hours PRN  albuterol/ipratropium for Nebulization 3 milliLiter(s) Nebulizer every 6 hours  aMIOdarone    Tablet   Oral   aMIOdarone    Tablet 200 milliGRAM(s) Oral daily  aspirin  chewable 81 milliGRAM(s) Oral daily  atorvastatin 80 milliGRAM(s) Oral at bedtime  buMETAnide 2 milliGRAM(s) Oral daily  chlorhexidine 0.12% Liquid 15 milliLiter(s) Oral Mucosa two times a day  chlorhexidine 2% Cloths 1 Application(s) Topical <User Schedule>  darbepoetin Injectable Syringe 25 MICROGram(s) IV Push <User Schedule>  dextrose 5%. 1000 milliLiter(s) IV Continuous <Continuous>  dextrose 5%. 1000 milliLiter(s) IV Continuous <Continuous>  dextrose 50% Injectable 12.5 Gram(s) IV Push once  dextrose 50% Injectable 25 Gram(s) IV Push once  dextrose 50% Injectable 25 Gram(s) IV Push once  dextrose Oral Gel 15 Gram(s) Oral once PRN  glucagon  Injectable 1 milliGRAM(s) IntraMuscular once  heparin   Injectable 5000 Unit(s) SubCutaneous every 12 hours  insulin lispro (ADMELOG) corrective regimen sliding scale   SubCutaneous three times a day before meals  levETIRAcetam  Solution 500 milliGRAM(s) Enteral Tube two times a day  pantoprazole    Tablet 40 milliGRAM(s) Oral two times a day  sodium chloride 0.9% Bolus. 100 milliLiter(s) IV Bolus every 5 minutes PRN  sodium chloride 0.9% Bolus. 100 milliLiter(s) IV Bolus every 5 minutes PRN  sodium chloride 0.9% Bolus. 100 milliLiter(s) IV Bolus every 5 minutes PRN        Assessment and Plan:  Patient is a 64 y/o Male  with  PMH of ICH (2021) s/p trach and PEG, HTN, HLD, T2DM, CAD s/p stents, Recurrent C diff, BIBEMS from [facility] for abnormal labs. Patient non-verbal at baseline . Per NH chart  have a BUN greater than 200 and creatinine of 4.3 on outpatient labs. Outpatient CXR also w/ new L sided pleural effusion. In ED, patient was hypotensive with Afib with RVR, found to be in acute renal failure, anemic with elevated trops. He was started on Amiodarone GTT, PPI GTT, gastric lavage with coffee ground secretions, started on broad spectrum abx and admitted to MICU  While in MICU, course complicated by DKA, s/p insulin drip, MICHELLE started on HD, acute anemia s/p PRBC transfusion, NSTEMI, downgraded to SDU -> floor than downgraded to Vent Unit.    # Acute Renal Failure, started on HD 6/1, as per Renal team rec.,   - daily BMP monitoring, daily weight, daily strict I/O chart  - patient is hypotensive- Bumex transitioned to PO 2 mg once daily to avoid symptomatic hypotension  - awaiting for permanent HD cath. placement    # anemia of chronic disease - h/h drop today, no gross bleeding events  - 7/7- give 1 unit prbc, next h/h at 4 pm today. obtain stool hemeoccult study    # Hyperkalemia- s/p Lokelma tx , monitor BMP     # Hypotension- d/c Cardizem, placed holding parameters on Bumex, f/up  am serum Cortisol level      # chronic respiratory failure, vent dependant via trach , trach care,  Vent management per Pulm. team   # Chronic dysphagia, sp peg, peg care   #Candiduria with U cx C tropicalis  # Afib w/ RVR - now rate controlled # Acute Decompensated CHF  CAD s/p stents  NSTEMI, medical management   # DKA, resolved  # Hx ICH   Bedbound from NH   - c/w keppra 500mg BID for seizure prophylaxis  # multiple sacral wounds. sacral and buttock , further wound care as per Wound care specialist report, frequent body position change, decub. prevention tx.     # hx of ICH, chronic non-verbal , quadriplegia - continue daily care.     # h/o DM 2- low bsfs - d/c lantus, continue bsfs monitoring     DVT proph: Heparin subc. tx.     #Progress Note Handoff: due to h/h drop today, give 1 unit prbc today, next h/h at 4 pm, CBC, BMP in am  , bsfs,  monitor VS, f/up cortisol level. HD as per Renal team. Patient remains with very poor overall prognosis , pending  candida auris swab cxs for placement  Family discussion: yes, medical team Disposition: SNF once medically stable    Total time spent to complete patient's bedside assessment, review medical chart, discuss medical plan of care with covering medical team was more than 50 minutes with >50% of time spent face to face with patient, discussion with patient/family and/or coordination of care.

## 2023-07-07 NOTE — PROCEDURE NOTE - PROCEDURE FINDINGS AND DETAILS
Successful placement of a right IJ 24 cm tunneled hemodialysis catheter. Catheter is heparinized and ready for immediate use. Catheter tip located at the level of the RA.     Previous placed non tunneled catheter was removed .

## 2023-07-07 NOTE — PROGRESS NOTE ADULT - SUBJECTIVE AND OBJECTIVE BOX
NUTRITION SUPPORT TEAM  -  PROGRESS NOTE     Interval Events:        REVIEW OF SYSTEMS:  Negative except as noted above.     VITALS:  T(F): 97.2 (07-07 @ 05:10), Max: 97.2 (07-07 @ 05:10)  HR: 78 (07-07 @ 05:10) (78 - 78)  BP: 91/58 (07-07 @ 05:10) (91/58 - 91/58)  RR: 18 (07-07 @ 05:10) (18 - 18)  SpO2: 100% (07-07 @ 09:25) (100% - 100%)    HEIGHT/WEIGHT/BMI:   Height (cm): 170.2 (06-01), 180.3 (10-04)  Weight (kg): 93.3 (06-05), 66.4 (10-04)  BMI (kg/m2): 32.2 (06-05), 22.9 (06-01), 20.4 (10-04)    PHYSICAL EXAM:   GENERAL: NAD, well-groomed, well-developed  HEENT: Moist mucous membranes, Good dentition, No lesions  ABDOMEN: Soft, Nontender, Nondistended  EXTREMITIES:  No clubbing, cyanosis, or edema  SKIN: warm and well perfused; No obvious rashes or lesions  IV ACCESS:   ENTERAL ACCESS:     I/Os:     07-06-23 @ 07:01  -  07-07-23 @ 07:00  --------------------------------------------------------  IN:    Enteral Tube Flush: 120 mL    Peptamen A.F.: 750 mL  Total IN: 870 mL    OUT:    Other (mL): 2500 mL  Total OUT: 2500 mL    Total NET: -1630 mL        MEDICATIONS:   acetaminophen     Tablet .. 650 milliGRAM(s) Oral every 6 hours PRN  albuterol/ipratropium for Nebulization 3 milliLiter(s) Nebulizer every 6 hours  aMIOdarone    Tablet   Oral   aMIOdarone    Tablet 200 milliGRAM(s) Oral daily  aspirin  chewable 81 milliGRAM(s) Oral daily  atorvastatin 80 milliGRAM(s) Oral at bedtime  buMETAnide 2 milliGRAM(s) Oral daily  chlorhexidine 0.12% Liquid 15 milliLiter(s) Oral Mucosa two times a day  chlorhexidine 2% Cloths 1 Application(s) Topical <User Schedule>  dextrose 5%. 1000 milliLiter(s) (100 mL/Hr) IV Continuous <Continuous>  glucagon  Injectable 1 milliGRAM(s) IntraMuscular once  heparin   Injectable 5000 Unit(s) SubCutaneous every 12 hours  insulin lispro (ADMELOG) corrective regimen sliding scale   SubCutaneous three times a day before meals  levETIRAcetam  Solution 500 milliGRAM(s) Enteral Tube two times a day  pantoprazole    Tablet 40 milliGRAM(s) Oral two times a day  LABS:                         6.6    11.51 )-----------( 390      ( 07 Jul 2023 10:40 )             21.2     139  |  103  |  40<H>  ----------------------------<  99          (07-07-23 @ 10:40)  4.0   |  29  |  2.0<H>    Ca    9.0          (07-07-23 @ 10:40)  Phos  2.1         (07-05-23 @ 12:07)  Mg     2.0         (07-07-23 @ 07:07)    TPro  5.3<L>  /  Alb  2.1<L>  /  TBili  0.3  /  DBili  x   /  AST  17  /  ALT  16  /  AlkPhos  151<H>       07-07-23 @ 10:40    Triglycerides, Serum:   Prealbumin, Serum:   Vitamin D, 25-Hydroxy: 70 ng/mL (06-03 @ 06:50)  Folate: >20.0 ng/mL (06-03 @ 06:50)  Vitamin B12, Serum: 1908 pg/mL (06-06 @ 10:40)  Zinc Level, Plasma: 54 ug/dL (06-03 @ 06:50)  Blood Glucose (Past 24 hours):  102 mg/dL (07-07 @ 12:03)  105 mg/dL (07-07 @ 08:04)  102 mg/dL (07-07 @ 06:12)  DIET:   Diet, NPO after Midnight:      NPO Start Date: 06-Jul-2023,   NPO Start Time: 23:59 (07-06-23 @ 10:05) [Active]  Diet, NPO with Tube Feed:   Tube Feeding Modality: Gastrostomy  Peptamen A.F. Formula  Total Volume for 24 Hours (mL): 1500  Bolus  Total Volume of Bolus (mL):  375  Tube Feed Frequency: Every 6 hours   Tube Feed Start Time: 16:00  Bolus Feed Rate (mL per Hour): 500   Bolus Feed Duration (in Hours): 0.75  Free Water Flush Instructions:  flush GT with 30 ml water syringe push before & after each feed (06-02-23 @ 15:58) [Active]  RADIOLOGY:  < from: US Kidney and Bladder (06.30.23 @ 18:37) >  IMPRESSION:  Echogenic kidneys compatible with parenchymal disease.  No hydronephrosis.  Thick-walled bladder. Correlate with urinalysis.   NUTRITION SUPPORT TEAM  -  PROGRESS NOTE     Interval Events:    clinically stable   tolerating enteral feeds, no further issues with diarrhea reported  glc had been low, lantus stopped  nephrology appreciated - + Bumex, + HD, await permanent access    REVIEW OF SYSTEMS:  Negative except as noted above.     VITALS:  T(F): 97.2 (07-07 @ 05:10), Max: 97.2 (07-07 @ 05:10)  HR: 78 (07-07 @ 05:10) (78 - 78)  BP: 91/58 (07-07 @ 05:10) (91/58 - 91/58)  RR: 18 (07-07 @ 05:10) (18 - 18)  SpO2: 100% (07-07 @ 09:25) (100% - 100%)    HEIGHT/WEIGHT/BMI:   Height (cm): 170.2 (06-01), 180.3 (10-04)  Weight (kg): 93.3 (06-05), 66.4 (10-04)  BMI (kg/m2): 32.2 (06-05), 22.9 (06-01), 20.4 (10-04)      07-06-23 @ 07:01  -  07-07-23 @ 07:00  --------------------------------------------------------  IN:    Enteral Tube Flush: 120 mL    Peptamen A.F.: 750 mL - reportedly given but incompletely documented  Total IN: 870 mL    OUT:    Other (mL): 2500 mL  Total OUT: 2500 mL    Total NET: -1630 mL        MEDICATIONS:   acetaminophen     Tablet .. 650 milliGRAM(s) Oral every 6 hours PRN  albuterol/ipratropium for Nebulization 3 milliLiter(s) Nebulizer every 6 hours  aMIOdarone    Tablet   Oral   aMIOdarone    Tablet 200 milliGRAM(s) Oral daily  aspirin  chewable 81 milliGRAM(s) Oral daily  atorvastatin 80 milliGRAM(s) Oral at bedtime  buMETAnide 2 milliGRAM(s) Oral daily  chlorhexidine 0.12% Liquid 15 milliLiter(s) Oral Mucosa two times a day  chlorhexidine 2% Cloths 1 Application(s) Topical <User Schedule>  dextrose 5%. 1000 milliLiter(s) (100 mL/Hr) IV Continuous <Continuous>  glucagon  Injectable 1 milliGRAM(s) IntraMuscular once  heparin   Injectable 5000 Unit(s) SubCutaneous every 12 hours  insulin lispro (ADMELOG) corrective regimen sliding scale   SubCutaneous three times a day before meals  levETIRAcetam  Solution 500 milliGRAM(s) Enteral Tube two times a day  pantoprazole    Tablet 40 milliGRAM(s) Oral two times a day  LABS:                         6.6    11.51 )-----------( 390      ( 07 Jul 2023 10:40 )             21.2     139  |  103  |  40<H>  ----------------------------<  99          (07-07-23 @ 10:40)  4.0   |  29  |  2.0<H>    Ca    9.0          (07-07-23 @ 10:40)  Phos  2.1         (07-05-23 @ 12:07)  Mg     2.0         (07-07-23 @ 07:07)    TPro  5.3<L>  /  Alb  2.1<L>  /  TBili  0.3  /  DBili  x   /  AST  17  /  ALT  16  /  AlkPhos  151<H>       07-07-23 @ 10:40    Triglycerides, Serum:   Prealbumin, Serum:   Vitamin D, 25-Hydroxy: 70 ng/mL (06-03 @ 06:50)  Folate: >20.0 ng/mL (06-03 @ 06:50)  Vitamin B12, Serum: 1908 pg/mL (06-06 @ 10:40)  Zinc Level, Plasma: 54 ug/dL (06-03 @ 06:50)  Blood Glucose (Past 24 hours):  102 mg/dL (07-07 @ 12:03)  105 mg/dL (07-07 @ 08:04)  102 mg/dL (07-07 @ 06:12)  DIET:   Diet, NPO after Midnight:      NPO Start Date: 06-Jul-2023,   NPO Start Time: 23:59 (07-06-23 @ 10:05) [Active]  Diet, NPO with Tube Feed:   Tube Feeding Modality: Gastrostomy  Peptamen A.F. Formula  Total Volume for 24 Hours (mL): 1500  Bolus  Total Volume of Bolus (mL):  375  Tube Feed Frequency: Every 6 hours   Tube Feed Start Time: 16:00  Bolus Feed Rate (mL per Hour): 500   Bolus Feed Duration (in Hours): 0.75  Free Water Flush Instructions:  flush GT with 30 ml water syringe push before & after each feed (06-02-23 @ 15:58) [Active]  RADIOLOGY:  < from: US Kidney and Bladder (06.30.23 @ 18:37) >  IMPRESSION:  Echogenic kidneys compatible with parenchymal disease.  No hydronephrosis.  Thick-walled bladder. Correlate with urinalysis.

## 2023-07-07 NOTE — PROGRESS NOTE ADULT - SUBJECTIVE AND OBJECTIVE BOX
Patient is a 63y old  Male who presents with a chief complaint of Abnormal labs (07 Jul 2023 12:06)        Over Night Events:  Remains on MV.  Off pressors.          ROS:     All ROS are negative except HPI         PHYSICAL EXAM    ICU Vital Signs Last 24 Hrs  T(C): 36.2 (07 Jul 2023 05:10), Max: 36.3 (06 Jul 2023 19:27)  T(F): 97.2 (07 Jul 2023 05:10), Max: 97.4 (06 Jul 2023 19:27)  HR: 78 (07 Jul 2023 05:10) (73 - 93)  BP: 91/58 (07 Jul 2023 05:10) (91/58 - 108/54)  BP(mean): --  ABP: --  ABP(mean): --  RR: 18 (07 Jul 2023 05:10) (18 - 18)  SpO2: 100% (07 Jul 2023 09:25) (100% - 100%)    O2 Parameters below as of 07 Jul 2023 05:10  Patient On (Oxygen Delivery Method): ventilator            CONSTITUTIONAL:  IN NAD     ENT:   Airway patent,   Mouth with normal mucosa.   Trach     EYES:   Pupils equal,   Round and reactive to light.    CARDIAC:   Normal rate,   Regular rhythm.    RESPIRATORY:   No wheezing  Bilateral BS  Normal chest expansion  Not tachypneic,  No use of accessory muscles    GASTROINTESTINAL:  Abdomen soft,   Non-tender,   No guarding,   + BS    MUSCULOSKELETAL:   Contracted  No clubbing, cyanosis    NEUROLOGICAL:   Does not follow commands     SKIN:   Skin normal color for race,   No evidence of rash.        07-06-23 @ 07:01  -  07-07-23 @ 07:00  --------------------------------------------------------  IN:    Enteral Tube Flush: 120 mL    Peptamen A.F.: 750 mL  Total IN: 870 mL    OUT:    Other (mL): 2500 mL  Total OUT: 2500 mL    Total NET: -1630 mL          LABS:                            6.6    11.51 )-----------( 390      ( 07 Jul 2023 10:40 )             21.2                                               07-07    139  |  103  |  40<H>  ----------------------------<  99  4.0   |  29  |  2.0<H>    Ca    9.0      07 Jul 2023 10:40  Mg     2.0     07-07    TPro  5.3<L>  /  Alb  2.1<L>  /  TBili  0.3  /  DBili  x   /  AST  17  /  ALT  16  /  AlkPhos  151<H>  07-07                                             Urinalysis Basic - ( 07 Jul 2023 10:40 )    Color: x / Appearance: x / SG: x / pH: x  Gluc: 99 mg/dL / Ketone: x  / Bili: x / Urobili: x   Blood: x / Protein: x / Nitrite: x   Leuk Esterase: x / RBC: x / WBC x   Sq Epi: x / Non Sq Epi: x / Bacteria: x                                                  LIVER FUNCTIONS - ( 07 Jul 2023 10:40 )  Alb: 2.1 g/dL / Pro: 5.3 g/dL / ALK PHOS: 151 U/L / ALT: 16 U/L / AST: 17 U/L / GGT: x                                                  Culture - Fungal, Other (collected 05 Jul 2023 12:20)  Source: .Other Other, RIGHT AXILLARY SWAB  Preliminary Report (06 Jul 2023 13:24):    Testing in progress                                                   Mode: AC/ CMV (Assist Control/ Continuous Mandatory Ventilation)  RR (machine): 14  TV (machine): 450  FiO2: 40  PEEP: 8  ITime: 1  MAP: 10  PIP: 22                                          MEDICATIONS  (STANDING):  albuterol/ipratropium for Nebulization 3 milliLiter(s) Nebulizer every 6 hours  aMIOdarone    Tablet   Oral   aMIOdarone    Tablet 200 milliGRAM(s) Oral daily  aspirin  chewable 81 milliGRAM(s) Oral daily  atorvastatin 80 milliGRAM(s) Oral at bedtime  buMETAnide 2 milliGRAM(s) Oral daily  chlorhexidine 0.12% Liquid 15 milliLiter(s) Oral Mucosa two times a day  chlorhexidine 2% Cloths 1 Application(s) Topical <User Schedule>  darbepoetin Injectable Syringe 25 MICROGram(s) IV Push <User Schedule>  dextrose 5%. 1000 milliLiter(s) (50 mL/Hr) IV Continuous <Continuous>  dextrose 5%. 1000 milliLiter(s) (100 mL/Hr) IV Continuous <Continuous>  dextrose 50% Injectable 25 Gram(s) IV Push once  dextrose 50% Injectable 25 Gram(s) IV Push once  dextrose 50% Injectable 12.5 Gram(s) IV Push once  glucagon  Injectable 1 milliGRAM(s) IntraMuscular once  heparin   Injectable 5000 Unit(s) SubCutaneous every 12 hours  insulin lispro (ADMELOG) corrective regimen sliding scale   SubCutaneous three times a day before meals  levETIRAcetam  Solution 500 milliGRAM(s) Enteral Tube two times a day  pantoprazole    Tablet 40 milliGRAM(s) Oral two times a day    MEDICATIONS  (PRN):  acetaminophen     Tablet .. 650 milliGRAM(s) Oral every 6 hours PRN Temp greater or equal to 38C (100.4F), Mild Pain (1 - 3)  dextrose Oral Gel 15 Gram(s) Oral once PRN Blood Glucose LESS THAN 70 milliGRAM(s)/deciliter  sodium chloride 0.9% Bolus. 100 milliLiter(s) IV Bolus every 5 minutes PRN SBP LESS THAN or EQUAL to 100 mmHg  sodium chloride 0.9% Bolus. 100 milliLiter(s) IV Bolus every 5 minutes PRN SBP LESS THAN or EQUAL to 100 mmHg  sodium chloride 0.9% Bolus. 100 milliLiter(s) IV Bolus every 5 minutes PRN SBP LESS THAN or EQUAL to 80 mmHg

## 2023-07-07 NOTE — PROGRESS NOTE ADULT - ASSESSMENT
ASSESSMENT   fluid overload and anasarca  - multiple areas of clean but deep decubiti  - DM  - hyponatremia  - ? DKA on admission  - A fib  - h/o recurrent C diff - cont to have loose brown BMs -  hypophosphatemia  loose BM     SUGGEST:  -  -continue with Peptamen AF      375 ml over 45 min x 4 feeds/d --> 1800 kcal (low % carb) 114 gm protein (whey source), 1200 mg phos, 62 mEq K/d  - check poc glucose prior to each feeding  - f/u phos with pre-HD labs  - limit pre and post feed flushes to 30 ml  - add Susan Stor twice a day

## 2023-07-08 NOTE — PROGRESS NOTE ADULT - SUBJECTIVE AND OBJECTIVE BOX
VIJAYSTEPHON ALICIA  [facility] (Back) 027 A ([facility] (Back))        Patient was evaluated and examined  by bedside, remains on vent support, scheduled for HD tx. today, s/p HD cath insertion yesterday by IR      REVIEW OF SYSTEMS: unable to obtain, patient is non-verbal         T(C): , Max: 36.2 (07-07-23 @ 19:08)  HR: 79 (07-08-23 @ 11:25)  BP: 116/69 (07-08-23 @ 11:25)  RR: 18 (07-08-23 @ 11:25)  SpO2: 100% (07-08-23 @ 11:25)  CAPILLARY BLOOD GLUCOSE      POCT Blood Glucose.: 152 mg/dL (08 Jul 2023 12:58)  POCT Blood Glucose.: 135 mg/dL (08 Jul 2023 05:41)  POCT Blood Glucose.: 98 mg/dL (07 Jul 2023 18:33)      PHYSICAL EXAM:  General: NAD, unresponsive, patient is laying comfortably in bed  HEENT: AT, NC, trach present  Lungs: mild decreased breath sounds  B/L, no wheezing, no rhonchi  CVS: normal S1, S2, RRR, NO M/G/R  Abdomen: soft, bowel sounds present, non-tender, non-distended, peg present  Extremities: contracted extremities, no edema, no clubbing, no cyanosis, positive peripheral pulses b/l  Neuro: non-verbal, quadriplegic  Skin: sacral wound present      LAB  CBC  Date: 07-08-23 @ 10:15  Mean cell Pussrzsojp22.0  Mean cell Hemoglobin Conc31.8  Mean cell Volum 94.4  Platelet count-Automate 390  RBC Count 2.50  Red Cell Distrib Width15.9  WBC Count13.23  % Albumin, Urine--  Hematocrit 23.6  Hemoglobin 7.5  CBC  Date: 07-07-23 @ 21:57  Mean cell Qechhhagxy33.7  Mean cell Hemoglobin Conc31.5  Mean cell Volum 94.2  Platelet count-Automate 377  RBC Count 2.93  Red Cell Distrib Width15.9  WBC Count10.93  % Albumin, Urine--  Hematocrit 27.6  Hemoglobin 8.7  CBC  Date: 07-07-23 @ 10:40  Mean cell Dxoelpybcw65.7  Mean cell Hemoglobin Conc31.1  Mean cell Volum 95.5  Platelet count-Automate 390  RBC Count 2.22  Red Cell Distrib Width15.1  WBC Count11.51  % Albumin, Urine--  Hematocrit 21.2  Hemoglobin 6.6  CBC  Date: 07-07-23 @ 07:07  Mean cell Eplvwvlnsu04.5  Mean cell Hemoglobin Conc30.7  Mean cell Volum 96.2  Platelet count-Automate 424  RBC Count 2.34  Red Cell Distrib Width15.2  WBC Count12.29  % Albumin, Urine--  Hematocrit 22.5  Hemoglobin 6.9  CBC  Date: 07-05-23 @ 08:24  Mean cell Tloeakirgl30.8  Mean cell Hemoglobin Conc31.5  Mean cell Volum 94.7  Platelet count-Automate 379  RBC Count 2.65  Red Cell Distrib Width15.3  WBC Count12.40  % Albumin, Urine--  Hematocrit 25.1  Hemoglobin 7.9  CBC  Date: 07-04-23 @ 08:55  Mean cell Grorsdhazu05.9  Mean cell Hemoglobin Conc31.9  Mean cell Volum 93.6  Platelet count-Automate 437  RBC Count 2.51  Red Cell Distrib Width15.0  WBC Count13.33  % Albumin, Urine--  Hematocrit 23.5  Hemoglobin 7.5  CBC  Date: 07-03-23 @ 12:22  Mean cell Zwmfjjvyzz24.7  Mean cell Hemoglobin Conc31.7  Mean cell Volum 93.8  Platelet count-Automate 446  RBC Count 2.59  Red Cell Distrib Width15.2  WBC Count11.17  % Albumin, Urine--  Hematocrit 24.3  Hemoglobin 7.7    BMP  07-08-23 @ 10:15  Blood Gas Arterial-Calcium,Ionized--  Blood Urea Nitrogen, Serum 52 mg/dL<H> [10 - 20]  Carbon Dioxide, Serum28 mmol/L [17 - 32]  Chloride, Unzfg143 mmol/L [98 - 110]  Creatinie, Serum2.3 mg/dL<H> [0.7 - 1.5]  Glucose, Gpzml783 mg/dL<H> [70 - 99]  Potassium, Serum4.2 mmol/L [3.5 - 5.0]  Sodium, Serum 141 mmol/L [135 - 146]  [facility]  07-07-23 @ 10:40  Blood Gas Arterial-Calcium,Ionized--  Blood Urea Nitrogen, Serum 40 mg/dL<H> [10 - 20]  Carbon Dioxide, Serum29 mmol/L [17 - 32]  Chloride, Pqqmp060 mmol/L [98 - 110]  Creatinie, Serum2.0 mg/dL<H> [0.7 - 1.5]  Glucose, Serum99 mg/dL [70 - 99]  Potassium, Serum4.0 mmol/L [3.5 - 5.0]  Sodium, Serum 139 mmol/L [135 - 146]  BMP  07-07-23 @ 07:07  Blood Gas Arterial-Calcium,Ionized--  Blood Urea Nitrogen, Serum 38 mg/dL<H> [10 - 20]  Carbon Dioxide, Serum28 mmol/L [17 - 32]  Chloride, Hhwpl120 mmol/L [98 - 110]  Creatinie, Serum2.0 mg/dL<H> [0.7 - 1.5]  Glucose, Serum89 mg/dL [70 - 99]  Potassium, Serum4.4 mmol/L [3.5 - 5.0]  Sodium, Serum 141 mmol/L [135 - 146]      PT/INR - ( 08 Jul 2023 10:15 )   PT: 12.40 sec;   INR: 1.08 ratio         PTT - ( 08 Jul 2023 10:15 )  PTT:32.7 sec      Microbiology:    Culture - Fungal, Other (collected 07-05-23 @ 12:20)  Source: .Other Other, RIGHT AXILLARY SWAB  Preliminary Report (07-06-23 @ 13:24):    Testing in progress    Culture - Blood (collected 06-13-23 @ 19:14)  Source: .Blood Blood  Final Report (06-19-23 @ 02:00):    No Growth Final    Culture - Blood (collected 06-11-23 @ 19:18)  Source: .Blood Blood  Final Report (06-16-23 @ 23:01):    No Growth Final    Culture - Sputum (collected 06-11-23 @ 18:28)  Source: Trach Asp Tracheal Aspirate  Gram Stain (06-12-23 @ 07:16):    Moderate polymorphonuclear leukocytes per low power field    Rare Squamous epithelial cells per low power field    Numerous Gram Negative Rods per oil power field    Moderate Gram Positive Rods per oil power field    Few Gram positive cocci in pairs per oil power field  Final Report (06-13-23 @ 18:59):    Three or more mixed gram negative rods including    Moderate Pseudomonas aeruginosa (Carbapenem Resistant)    Normal Respiratory Susan present  Organism: Pseudomonas aeruginosa (Carbapenem Resistant)  Pseudomonas aeruginosa (Carbapenem Resistant) (06-13-23 @ 18:59)  Organism: Pseudomonas aeruginosa (Carbapenem Resistant) (06-13-23 @ 18:59)      Method Type: CarbaR      -  Resistance Gene to Carbapenem: Nondet  Organism: Pseudomonas aeruginosa (Carbapenem Resistant) (06-13-23 @ 18:59)      Method Type: TAMIE      -  Amikacin: S <=16      -  Aztreonam: I 16      -  Cefepime: I 16      -  Ceftazidime: R >16      -  Ceftazidime/Avibactam: S <=4      -  Ceftolozane/tazobactam: S <=2      -  Ciprofloxacin: R >2      -  Gentamicin: S 4      -  Imipenem: R >8      -  Levofloxacin: R >4      -  Meropenem: R >8      -  Piperacillin/Tazobactam: R >64      -  Tobramycin: S <=2    Culture - Urine (collected 06-11-23 @ 18:25)  Source: Catheterized Catheterized  Final Report (06-14-23 @ 18:40):    >100,000 CFU/ml Pseudomonas aeruginosa (Carbapenem Resistant)  Organism: Pseudomonas aeruginosa (Carbapenem Resistant)  Pseudomonas aeruginosa (Carbapenem Resistant) (06-14-23 @ 18:40)  Organism: Pseudomonas aeruginosa (Carbapenem Resistant) (06-14-23 @ 18:40)      Method Type: CarbaR      -  Resistance Gene to Carbapenem: Nondet  Organism: Pseudomonas aeruginosa (Carbapenem Resistant) (06-14-23 @ 18:40)      Method Type: TAMIE      -  Amikacin: S <=16      -  Aztreonam: R >16      -  Cefepime: R >16      -  Ceftazidime: R >16      -  Ceftazidime/Avibactam: S 8      -  Ceftolozane/tazobactam: S <=2      -  Ciprofloxacin: R >2      -  Gentamicin: I 8      -  Imipenem: R >8      -  Levofloxacin: R >4      -  Meropenem: R >8      -  Piperacillin/Tazobactam: R >64      -  Tobramycin: S <=2        Medications:  acetaminophen     Tablet .. 650 milliGRAM(s) Oral every 6 hours PRN  albuterol/ipratropium for Nebulization 3 milliLiter(s) Nebulizer every 6 hours  aMIOdarone    Tablet   Oral   aMIOdarone    Tablet 200 milliGRAM(s) Oral daily  aspirin  chewable 81 milliGRAM(s) Oral daily  atorvastatin 80 milliGRAM(s) Oral at bedtime  buMETAnide 2 milliGRAM(s) Oral daily  chlorhexidine 0.12% Liquid 15 milliLiter(s) Oral Mucosa two times a day  chlorhexidine 2% Cloths 1 Application(s) Topical <User Schedule>  darbepoetin Injectable Syringe 25 MICROGram(s) IV Push <User Schedule>  dextrose 5%. 1000 milliLiter(s) IV Continuous <Continuous>  dextrose 5%. 1000 milliLiter(s) IV Continuous <Continuous>  dextrose 50% Injectable 12.5 Gram(s) IV Push once  dextrose 50% Injectable 25 Gram(s) IV Push once  dextrose 50% Injectable 25 Gram(s) IV Push once  dextrose Oral Gel 15 Gram(s) Oral once PRN  glucagon  Injectable 1 milliGRAM(s) IntraMuscular once  heparin   Injectable 5000 Unit(s) SubCutaneous every 12 hours  insulin lispro (ADMELOG) corrective regimen sliding scale   SubCutaneous three times a day before meals  levETIRAcetam  Solution 500 milliGRAM(s) Enteral Tube two times a day  pantoprazole    Tablet 40 milliGRAM(s) Oral two times a day  sodium chloride 0.9% Bolus. 100 milliLiter(s) IV Bolus every 5 minutes PRN  sodium chloride 0.9% Bolus. 100 milliLiter(s) IV Bolus every 5 minutes PRN  sodium chloride 0.9% Bolus. 100 milliLiter(s) IV Bolus every 5 minutes PRN  sodium chloride 0.9% Bolus. 100 milliLiter(s) IV Bolus every 5 minutes PRN        Assessment and Plan:  Patient is a 62 y/o Male  with  PMH of ICH (2021) s/p trach and PEG, HTN, HLD, T2DM, CAD s/p stents, Recurrent C diff, BIBEMS from [facility] for abnormal labs. Patient non-verbal at baseline . Per NH chart  have a BUN greater than 200 and creatinine of 4.3 on outpatient labs. Outpatient CXR also w/ new L sided pleural effusion. In ED, patient was hypotensive with Afib with RVR, found to be in acute renal failure, anemic with elevated trops. He was started on Amiodarone GTT, PPI GTT, gastric lavage with coffee ground secretions, started on broad spectrum abx and admitted to MICU  While in MICU, course complicated by DKA, s/p insulin drip, MICHELLE started on HD, acute anemia s/p PRBC transfusion, NSTEMI, downgraded to SDU -> floor than downgraded to Vent Unit.    # Acute Renal Failure, started on HD 6/1, as per Renal team rec.   - daily BMP monitoring, daily weight, daily strict I/O chart  - patient is hypotensive- Bumex transitioned to PO 2 mg once daily to avoid symptomatic hypotension  - s/p permanent HD cath. placement on 7/7/23    # anemia of chronic disease - no gross bleeding events  - 7/7- s/p 1 unit PRBC, 7/8 hg- 7.5    # Hyperkalemia- corrected     # Hypotension- d/c Cardizem, placed holding parameters on Bumex, am serum Cortisol level- 12.3       # chronic respiratory failure, vent dependant via trach , trach care,  Vent management per Pulm. team   # Chronic dysphagia, sp peg, peg care   #Candiduria with U cx C tropicalis  # Afib w/ RVR - now rate controlled # Acute Decompensated CHF  CAD s/p stents  NSTEMI, medical management   # DKA, resolved  # Hx ICH   Bedbound from NH   - c/w keppra 500mg BID for seizure prophylaxis  # multiple sacral wounds. sacral and buttock , further wound care as per Wound care specialist report, frequent body position change, decub. prevention tx.     # hx of ICH, chronic non-verbal , quadriplegia - continue daily care.     # h/o DM 2- low bsfs - d/c lantus, continue bsfs monitoring     DVT proph: Heparin subc. tx.     #Progress Note Handoff: monitor daily  CBC post blood transfusion yesterday, BMP in am  , bsfs,  monitor VS,  HD as per Renal team. Patient remains with very poor overall prognosis , pending  candida auris swab cxs for placement  Family discussion: yes, medical team Disposition: SNF once medically stable    Total time spent to complete patient's bedside assessment, review medical chart, discuss medical plan of care with covering medical team was more than 50 minutes with >50% of time spent face to face with patient, discussion with patient/family and/or coordination of care.

## 2023-07-08 NOTE — PROGRESS NOTE ADULT - SUBJECTIVE AND OBJECTIVE BOX
Patient is a 63y old  Male who presents with a chief complaint of Abnormal labs (08 Jul 2023 07:41)      Over Night Events:  Patient seen and examined.   on vent     ROS:  See HPI    PHYSICAL EXAM    ICU Vital Signs Last 24 Hrs  T(C): 36.2 (08 Jul 2023 05:00), Max: 36.2 (07 Jul 2023 19:08)  T(F): 97.1 (08 Jul 2023 05:00), Max: 97.1 (07 Jul 2023 19:08)  HR: 83 (08 Jul 2023 09:25) (72 - 85)  BP: 116/67 (08 Jul 2023 09:25) (104/59 - 116/67)  BP(mean): --  ABP: --  ABP(mean): --  RR: 20 (08 Jul 2023 09:25) (18 - 20)  SpO2: 100% (08 Jul 2023 09:25) (97% - 100%)    O2 Parameters below as of 08 Jul 2023 09:25  Patient On (Oxygen Delivery Method): ventilator            General:awake   HEENT:   tarch              Lymph Nodes: NO cervical LN   Lungs: Bilateral BS  Cardiovascular: Regular   Abdomen: Soft, Positive BS  Extremities: No clubbing   Skin: warm   Neurological:   Musculoskeletal: move all ext     I&O's Detail      LABS:                          7.5    13.23 )-----------( 390      ( 08 Jul 2023 10:15 )             23.6         07 Jul 2023 10:40    139    |  103    |  40     ----------------------------<  99     4.0     |  29     |  2.0      Ca    9.0        07 Jul 2023 10:40  Mg     2.0       07 Jul 2023 07:07    TPro  5.3    /  Alb  2.1    /  TBili  0.3    /  DBili  x      /  AST  17     /  ALT  16     /  AlkPhos  151    07 Jul 2023 10:40  Amylase x     lipase x                                                                                        Urinalysis Basic - ( 07 Jul 2023 10:40 )    Color: x / Appearance: x / SG: x / pH: x  Gluc: 99 mg/dL / Ketone: x  / Bili: x / Urobili: x   Blood: x / Protein: x / Nitrite: x   Leuk Esterase: x / RBC: x / WBC x   Sq Epi: x / Non Sq Epi: x / Bacteria: x                                                                Culture - Fungal, Other (collected 05 Jul 2023 12:20)  Source: .Other Other, RIGHT AXILLARY SWAB  Preliminary Report (06 Jul 2023 13:24):    Testing in progress                                                   Mode: AC/ CMV (Assist Control/ Continuous Mandatory Ventilation)  RR (machine): 14  TV (machine): 450  FiO2: 40  PEEP: 8  ITime: 1  MAP: 14  PIP: 27                                          MEDICATIONS  (STANDING):  albuterol/ipratropium for Nebulization 3 milliLiter(s) Nebulizer every 6 hours  aMIOdarone    Tablet   Oral   aMIOdarone    Tablet 200 milliGRAM(s) Oral daily  aspirin  chewable 81 milliGRAM(s) Oral daily  atorvastatin 80 milliGRAM(s) Oral at bedtime  buMETAnide 2 milliGRAM(s) Oral daily  chlorhexidine 0.12% Liquid 15 milliLiter(s) Oral Mucosa two times a day  chlorhexidine 2% Cloths 1 Application(s) Topical <User Schedule>  darbepoetin Injectable Syringe 25 MICROGram(s) IV Push <User Schedule>  dextrose 5%. 1000 milliLiter(s) (100 mL/Hr) IV Continuous <Continuous>  dextrose 5%. 1000 milliLiter(s) (50 mL/Hr) IV Continuous <Continuous>  dextrose 50% Injectable 25 Gram(s) IV Push once  dextrose 50% Injectable 25 Gram(s) IV Push once  dextrose 50% Injectable 12.5 Gram(s) IV Push once  glucagon  Injectable 1 milliGRAM(s) IntraMuscular once  heparin   Injectable 5000 Unit(s) SubCutaneous every 12 hours  insulin lispro (ADMELOG) corrective regimen sliding scale   SubCutaneous three times a day before meals  levETIRAcetam  Solution 500 milliGRAM(s) Enteral Tube two times a day  pantoprazole    Tablet 40 milliGRAM(s) Oral two times a day    MEDICATIONS  (PRN):  acetaminophen     Tablet .. 650 milliGRAM(s) Oral every 6 hours PRN Temp greater or equal to 38C (100.4F), Mild Pain (1 - 3)  dextrose Oral Gel 15 Gram(s) Oral once PRN Blood Glucose LESS THAN 70 milliGRAM(s)/deciliter  sodium chloride 0.9% Bolus. 100 milliLiter(s) IV Bolus every 5 minutes PRN SBP LESS THAN or EQUAL to 100 mmHg  sodium chloride 0.9% Bolus. 100 milliLiter(s) IV Bolus every 5 minutes PRN SBP LESS THAN or EQUAL to 90 mmHg  sodium chloride 0.9% Bolus. 100 milliLiter(s) IV Bolus every 5 minutes PRN SBP LESS THAN or EQUAL to 100 mmHg  sodium chloride 0.9% Bolus. 100 milliLiter(s) IV Bolus every 5 minutes PRN SBP LESS THAN or EQUAL to 80 mmHg          Xrays:  TLC:  OG:  ET tube:                                                                                       ECHO:  CAM ICU:

## 2023-07-08 NOTE — PROGRESS NOTE ADULT - SUBJECTIVE AND OBJECTIVE BOX
seen and examined   24 h events noted         PAST HISTORY  --------------------------------------------------------------------------------  No significant changes to PMH, PSH, FHx, SHx, unless otherwise noted    ALLERGIES & MEDICATIONS  --------------------------------------------------------------------------------  Allergies    penicillin (Other)    Intolerances      Standing Inpatient Medications  albuterol/ipratropium for Nebulization 3 milliLiter(s) Nebulizer every 6 hours  aMIOdarone    Tablet 200 milliGRAM(s) Oral daily  aMIOdarone    Tablet   Oral   aspirin  chewable 81 milliGRAM(s) Oral daily  atorvastatin 80 milliGRAM(s) Oral at bedtime  buMETAnide 2 milliGRAM(s) Oral daily  chlorhexidine 0.12% Liquid 15 milliLiter(s) Oral Mucosa two times a day  chlorhexidine 2% Cloths 1 Application(s) Topical <User Schedule>  darbepoetin Injectable Syringe 25 MICROGram(s) IV Push <User Schedule>  dextrose 5%. 1000 milliLiter(s) IV Continuous <Continuous>  dextrose 5%. 1000 milliLiter(s) IV Continuous <Continuous>  dextrose 50% Injectable 25 Gram(s) IV Push once  dextrose 50% Injectable 25 Gram(s) IV Push once  dextrose 50% Injectable 12.5 Gram(s) IV Push once  glucagon  Injectable 1 milliGRAM(s) IntraMuscular once  heparin   Injectable 5000 Unit(s) SubCutaneous every 12 hours  insulin lispro (ADMELOG) corrective regimen sliding scale   SubCutaneous three times a day before meals  levETIRAcetam  Solution 500 milliGRAM(s) Enteral Tube two times a day  pantoprazole    Tablet 40 milliGRAM(s) Oral two times a day    PRN Inpatient Medications  acetaminophen     Tablet .. 650 milliGRAM(s) Oral every 6 hours PRN  dextrose Oral Gel 15 Gram(s) Oral once PRN  sodium chloride 0.9% Bolus. 100 milliLiter(s) IV Bolus every 5 minutes PRN  sodium chloride 0.9% Bolus. 100 milliLiter(s) IV Bolus every 5 minutes PRN  sodium chloride 0.9% Bolus. 100 milliLiter(s) IV Bolus every 5 minutes PRN          VITALS/PHYSICAL EXAM  --------------------------------------------------------------------------------  T(C): 36.2 (07-08-23 @ 05:00), Max: 36.2 (07-07-23 @ 19:08)  HR: 83 (07-08-23 @ 05:00) (72 - 85)  BP: 111/63 (07-08-23 @ 05:00) (104/59 - 111/63)  RR: 18 (07-08-23 @ 05:00) (18 - 19)  SpO2: 100% (07-08-23 @ 04:47) (97% - 100%)  Wt(kg): --        Physical Exam:  	Gen: trach/vent   	Pulm: B/L aline   	CV: S1S2; no rub  	Abd: +distended  	LE: edema  	Vascular access:tdc     LABS/STUDIES  --------------------------------------------------------------------------------              8.7    10.93 >-----------<  377      [07-07-23 @ 21:57]              27.6     139  |  103  |  40  ----------------------------<  99      [07-07-23 @ 10:40]  4.0   |  29  |  2.0        Ca     9.0     [07-07-23 @ 10:40]      Mg     2.0     [07-07-23 @ 07:07]    TPro  5.3  /  Alb  2.1  /  TBili  0.3  /  DBili  x   /  AST  17  /  ALT  16  /  AlkPhos  151  [07-07-23 @ 10:40]      Creatinine Trend:  SCr 2.0 [07-07 @ 10:40]  SCr 2.0 [07-07 @ 07:07]  SCr 1.7 [07-06 @ 17:44]  SCr 2.3 [07-05 @ 12:07]  SCr 2.5 [07-03 @ 12:22]    Urinalysis - [07-07-23 @ 10:40]      Color  / Appearance  / SG  / pH       Gluc 99 / Ketone   / Bili  / Urobili        Blood  / Protein  / Leuk Est  / Nitrite       RBC  / WBC  / Hyaline  / Gran  / Sq Epi  / Non Sq Epi  / Bacteria       Iron 51, TIBC 111, %sat 46      [06-06-23 @ 10:40]  Ferritin 1956      [06-06-23 @ 10:40]  Vitamin D (25OH) 70      [06-03-23 @ 06:50]    HIV Nonreact      [06-08-23 @ 16:00]  HIV Nonreact      [06-08-23 @ 12:20]

## 2023-07-08 NOTE — PROGRESS NOTE ADULT - ASSESSMENT
64 YO F  PMH of ICH (2021) s/p trach and PEG, HTN, HLD, T2DM, CAD s/p stents, Recurrent C diff, BIBEMS from [facility] for abnormal labs.   MICHELLE on CKD 3a - severe azotemia  likely due to GIB / hypotension  Started HD on 6/1/23  Acute anemia d/t UGIB  Troponemia  Lactic acidosis resolved   DKA resolved   Afib   -  HD today  / might be able to do hd twice a week   - bumex 2 q 12 / document UO   -   last phos 2.1 not on binders   -  h/h noted / tx if h <7 / sat elevated / PIYUSH with HD   -  feed : nepro k noted / off lokelma   - s/p TDC   - start midodrine 5 q 8   - very poor prognosis   will follow

## 2023-07-09 NOTE — PROGRESS NOTE ADULT - ATTENDING COMMENTS
Patient is a 64 y/o Male  with  PMH of ICH (2021) s/p trach and PEG, HTN, HLD, T2DM, CAD s/p stents, Recurrent C diff, BIBEMS from [facility] for abnormal labs. Patient non-verbal at baseline . Per NH chart  have a BUN greater than 200 and creatinine of 4.3 on outpatient labs. Outpatient CXR also w/ new L sided pleural effusion. In ED, patient was hypotensive with Afib with RVR, found to be in acute renal failure, anemic with elevated trops. He was started on Amiodarone GTT, PPI GTT, gastric lavage with coffee ground secretions, started on broad spectrum abx and admitted to MICU  While in MICU, course complicated by DKA, s/p insulin drip, MICHELLE started on HD, acute anemia s/p PRBC transfusion, NSTEMI, downgraded to SDU -> floor than downgraded to Vent Unit.    # Acute Renal Failure, started on HD 6/1, as per Renal team rec.   - daily BMP monitoring, daily weight, daily strict I/O chart  - patient is hypotensive- Bumex transitioned to PO 2 mg once daily to avoid symptomatic hypotension  - s/p permanent HD cath. placement on 7/7/23    # anemia of chronic disease - no gross bleeding events  - 7/7- s/p 1 unit PRBC, 7/8 hg- 7.5  -f/up CBC today    # Hyperkalemia- corrected     # Hypotension- d/c Cardizem, placed holding parameters on Bumex, am serum Cortisol level- 12.3       # chronic respiratory failure, vent dependant via trach , trach care,  Vent management per Pulm. team   # Chronic dysphagia, sp peg, peg care   #Candiduria with U cx C tropicalis  # Afib w/ RVR - now rate controlled # Acute Decompensated CHF  CAD s/p stents  NSTEMI, medical management   # DKA, resolved  # Hx ICH   Bedbound from NH   - c/w keppra 500mg BID for seizure prophylaxis  # multiple sacral wounds. sacral and buttock , further wound care as per Wound care specialist report, frequent body position change, decub. prevention tx.     # hx of ICH, chronic non-verbal , quadriplegia - continue daily care.     # h/o DM 2- low bsfs - d/c lantus, continue bsfs monitoring     DVT proph: Heparin subc. tx.     #Progress Note Handoff: monitor daily  CBC ,  BMP in am  , bsfs,  monitor VS,  HD as per Renal team. Patient remains with very poor overall prognosis , pending  candida auris swab cxs for placement  Family discussion: yes, medical team Disposition: SNF once medically stable    Total time spent to complete patient's bedside assessment, review medical chart, discuss medical plan of care with covering medical team was more than 35 minutes with >50% of time spent face to face with patient, discussion with patient/family and/or coordination of care.

## 2023-07-09 NOTE — PROGRESS NOTE ADULT - SUBJECTIVE AND OBJECTIVE BOX
---------Daily Progress Note-------    History of Present Illness:   62 yo M with  PMH of ICH (2021) s/p trach and PEG, HTN, HLD, T2DM, CAD s/p stents, Recurrent C diff, BIBEMS from [facility] for abnormal labs. Patient non-verbal at baseline - limited history. Per NH chart have a BUN greater than 200 and creatinine of 4.3 on outpatient labs. Outpatient CXR also w/ new L sided pleural effusion. With EMS patient was also found to be in irregular rhythm, no known history of A-fib or a flutter.  In ED patient had borderline BP, and was in Atrial fibrillation. He was started on amiodarone gtt. Labs showed , Cr. 4.1, Hb 6.6. Trops 1.8. He was given 1U PRBC, gastric lavage revealed coffee ground emesis w/no active bleeding. He was started on PPI drip and GI consulted in ED. He was given IV aztreonam and vancomycin.   Patient admitted to ICU for management of Sepsis from UTI, MICHELLE likely prerenal from anemia and diarrhea and NSTEMI. TTE with EF of 63%, G1DD. NSTEMI stable, cardiology recommended medical management with aspirin and statin. Found to have Candiduria s/p fluconzaole and ceftolozane-tazobactam, DKA s/p insulin drip (currently on glargine 10units at bedtime with FS in 130s-170s), acute rental failure with urine production s/p R IJ U-dall placed on 6/1, hyperkalemia requiring HD and Lokelma, acute anemia requiring multiple transfusions (most recent 6/28, Hgb 6.5->7.8). Nephrology continuing to follow, TDC was placed on 7/7 after receiving a blood transfusion (1 unit PRBC for Hb<7)  24H events:    Patient is a 63y old Male who presents with a chief complaint of Abnormal labs (01 Jul 2023 07:55)    Primary diagnosis of MICHELLE (acute kidney injury)      Today is hospital day 39d. This morning patient was seen and examined at bedside, resting comfortably in bed.    No acute or major events overnight.    Code Status: DNR    Family Communication: Called wife, updated on likely TDC placement next week     PAST MEDICAL & SURGICAL HISTORY  HTN (hypertension)    Diabetes mellitus    H/O intracranial hemorrhage    H/O tracheostomy    PEG (percutaneous endoscopic gastrostomy) status    Hyperlipidemia      SOCIAL HISTORY:  Social History:      ALLERGIES:  penicillin (Other)    MEDICATIONS:  MEDICATIONS  (STANDING):  albuterol/ipratropium for Nebulization 3 milliLiter(s) Nebulizer every 6 hours  aMIOdarone    Tablet 200 milliGRAM(s) Oral daily  aMIOdarone    Tablet   Oral   aspirin  chewable 81 milliGRAM(s) Oral daily  atorvastatin 80 milliGRAM(s) Oral at bedtime  buMETAnide 2 milliGRAM(s) Oral daily  chlorhexidine 0.12% Liquid 15 milliLiter(s) Oral Mucosa two times a day  chlorhexidine 2% Cloths 1 Application(s) Topical <User Schedule>  darbepoetin Injectable Syringe 25 MICROGram(s) IV Push <User Schedule>  dextrose 5%. 1000 milliLiter(s) (100 mL/Hr) IV Continuous <Continuous>  dextrose 5%. 1000 milliLiter(s) (50 mL/Hr) IV Continuous <Continuous>  dextrose 50% Injectable 25 Gram(s) IV Push once  dextrose 50% Injectable 25 Gram(s) IV Push once  dextrose 50% Injectable 12.5 Gram(s) IV Push once  glucagon  Injectable 1 milliGRAM(s) IntraMuscular once  heparin   Injectable 5000 Unit(s) SubCutaneous every 12 hours  insulin lispro (ADMELOG) corrective regimen sliding scale   SubCutaneous three times a day before meals  levETIRAcetam  Solution 500 milliGRAM(s) Enteral Tube two times a day  pantoprazole    Tablet 40 milliGRAM(s) Oral two times a day    MEDICATIONS  (PRN):  acetaminophen     Tablet .. 650 milliGRAM(s) Oral every 6 hours PRN Temp greater or equal to 38C (100.4F), Mild Pain (1 - 3)  dextrose Oral Gel 15 Gram(s) Oral once PRN Blood Glucose LESS THAN 70 milliGRAM(s)/deciliter  sodium chloride 0.9% Bolus. 100 milliLiter(s) IV Bolus every 5 minutes PRN SBP LESS THAN or EQUAL to 80 mmHg  sodium chloride 0.9% Bolus. 100 milliLiter(s) IV Bolus every 5 minutes PRN SBP LESS THAN or EQUAL to 90 mmHg  sodium chloride 0.9% Bolus. 100 milliLiter(s) IV Bolus every 5 minutes PRN SBP LESS THAN or EQUAL to 100 mmHg  sodium chloride 0.9% Bolus. 100 milliLiter(s) IV Bolus every 5 minutes PRN SBP LESS THAN or EQUAL to 100 mmHg          VITALS:   Vital Signs Last 24 Hrs  T(C): 37.7 (08 Jul 2023 21:00), Max: 37.7 (08 Jul 2023 21:00)  T(F): 99.8 (08 Jul 2023 21:00), Max: 99.8 (08 Jul 2023 21:00)  HR: 76 (08 Jul 2023 22:28) (72 - 89)  BP: 117/56 (08 Jul 2023 21:00) (111/63 - 117/56)  BP(mean): --  RR: 18 (08 Jul 2023 11:25) (18 - 20)  SpO2: 100% (08 Jul 2023 22:28) (99% - 100%)    Parameters below as of 08 Jul 2023 11:25  Patient On (Oxygen Delivery Method): ventilator      PHYSICAL EXAM:  GENERAL: NAD, lying in bed   HEENT: Atraumatic, neck trach in place  LUNGS: B/L air entry, no adventitious breath sounds   HEART: Regular rate and rhythm. Normal s1s2.    ABD: Soft, non-tender, non-distended. Bowel sounds present. Peg in place.   EXT: B/L LE pitting edema   NEURO: AAOX0  multiple pressures ulcers on the back and buttock      LABS:             LABS:      LABS:                          7.5    13.23 )-----------( 390      ( 08 Jul 2023 10:15 )             23.6     07-08    141  |  102  |  52<H>  ----------------------------<  184<H>  4.2   |  28  |  2.3<H>    Ca    9.0      08 Jul 2023 10:15  Mg     2.2     07-08    TPro  5.4<L>  /  Alb  2.0<L>  /  TBili  0.3  /  DBili  x   /  AST  17  /  ALT  16  /  AlkPhos  160<H>  07-08    LIVER FUNCTIONS - ( 08 Jul 2023 10:15 )  Alb: 2.0 g/dL / Pro: 5.4 g/dL / ALK PHOS: 160 U/L / ALT: 16 U/L / AST: 17 U/L / GGT: x           PT/INR - ( 08 Jul 2023 10:15 )   PT: 12.40 sec;   INR: 1.08 ratio         PTT - ( 08 Jul 2023 10:15 )  PTT:32.7 sec  Urinalysis Basic - ( 08 Jul 2023 10:15 )    Color: x / Appearance: x / SG: x / pH: x  Gluc: 184 mg/dL / Ketone: x  / Bili: x / Urobili: x   Blood: x / Protein: x / Nitrite: x   Leuk Esterase: x / RBC: x / WBC x   Sq Epi: x / Non Sq Epi: x / Bacteria: x                  RADIOLOGY:  < from: US Kidney and Bladder (06.30.23 @ 18:37) >  ACC: [id number] EXAM:  US KIDNEYS AND BLADDER   ORDERED BY: JAI BEHGAL     PROCEDURE DATE:  06/30/2023          INTERPRETATION:  CLINICAL INFORMATION: Acute kidney injury    COMPARISON: Sonogram dated 5/31/2023    TECHNIQUE: Sonography of the kidneysand bladder.    FINDINGS:    Right kidney: 12.6 cm Echogenic.. No hydronephrosis.    Left kidney:  11.7 cm. Echogenic. No hydronephrosis. 3.0 cm cyst.    Urinary bladder: Patient voided prior to exam. Thick-walled bladder with   volume of 130 cc.    IMPRESSION:    Echogenic kidneys compatible with parenchymal disease.    No hydronephrosis.    Thick-walled bladder. Correlate with urinalysis.    --- End of Report ---    < end of copied text >

## 2023-07-09 NOTE — PROGRESS NOTE ADULT - ASSESSMENT
64 yo M with  PMH of ICH (2021) s/p trach and PEG, HTN, HLD, T2DM, CAD s/p stents, Recurrent C diff, BIBEMS from [facility] for abnormal labs. Patient non-verbal at baseline - limited history. Per NH chart  have a BUN greater than 200 and creatinine of 4.3 on outpatient labs. Outpatient CXR also w/ new L sided pleural effusion. In ED, patient was hypotensive with Afib with RVR, found to be in acure renal failure, anemic with elevated trops. He was started on Amiodarone GTT, PPI GTT, gastric lavage with coffee ground secretions, started on broad spectrum abx and admitted to MICU  While in MICU, course complicated by DKA, s/p insulin drip, MICHELLE started on HD, acute anemia s/p PRBC transfusion, NSTEMI, downgraded to SDU -> floor. Pending TDC placement for discharge.    # Acute Renal Failure, started on HD 6/1  -Patient cleared by ID for TDC placement. Pending IR  # Mild Hyperkalemia  # Fluid Overload- Improving- c/w Bumex and HD  # HAGMA- resolved  - non oliguric, RBUS without hydro   - started on HD 6/1 via R IJ Tasha   - Nephro on board- cw HD  - s/p Midodrine- BP better, s/p Sodium Bicarb- acidosis improved  - TTE 6/1 - EF 63%, GIDD   - cw Lokelma and HD for high K- May need long term standing lokelma  -Bladder US: thick-walled bladder, no hydronephrosis, parenchymal disease      # Diarrhea   GI PCR -ve, C.diff -ve   cont dignishield     # chronic respiratory failure,   vent dependant via trach   trach care    # functional dysphagia,   sp peg   peg care     # Candiduria with U cx C tropicalis  # MDRO UTI and PNA-  s/p Fluconazole  s/p Ceftolozane-tazobactam    # Acute anemia  gatric lavage negative   resolved s/p Multiple transfusion    # Hyponatremia-resolved  -likely hypervolemic  -c/w bumex 2mg iv q12hr and HD per nephro    # Afib w/ RVR -   # Acute Decompensated CHF  # CAD s/p stents  # NSTEMI type 2  -now rate controlled- c/w Amio and Cardizem  -CHF resolved s/p diuresis  -Seen by cardio 5/31, recommend c/w asa and stating, medical management for NSTEMI    # DKA, resolved  -s/p insulin drip, now on basal/bolus and tube feeds  Monitor FS     # Hx ICH  # multiple sacral wounds  # functional paraplegia ,   Bedbound from NH  has upper ext contracture total care   c/w keppra 500mg BID for seizure prophylaxis  sacral and buttock wounds , un-stageable  Care instructions per wound care:   Clean sacrum , B/L buttock and upper back wound with vashe wound cleanser, pat dry then apply hydrogel with moist vashe guaze dressing  Q 12 hours   Continue skin barrier  to R heel - monitor progression  Pressure  injury  preventive  measures  Skin  and incontinence care     #Hypotension  Cardizem was discontinued and holding parameters placed on Bumex  AM serum cortisol WNL    #Misc  -DVT prophylaxis: Heparin SQ  -GI prophylaxis: Protonix IV BID  -Diet: Tube feed  -Code status: DNR  -Activity: IAT  -Dispo: Vent unit- SNF when stable    Pending Nephro for long term HD then DC    #Hand off

## 2023-07-09 NOTE — PROGRESS NOTE ADULT - SUBJECTIVE AND OBJECTIVE BOX
Patient is a 63y old  Male who presents with a chief complaint of Abnormal labs (09 Jul 2023 02:52)      Over Night Events:  Patient seen and examined.   on vent   afebrile     ROS:  See HPI    PHYSICAL EXAM    ICU Vital Signs Last 24 Hrs  T(C): 36.3 (09 Jul 2023 04:53), Max: 37.7 (08 Jul 2023 21:00)  T(F): 97.4 (09 Jul 2023 04:53), Max: 99.8 (08 Jul 2023 21:00)  HR: 86 (09 Jul 2023 06:00) (76 - 89)  BP: 124/62 (09 Jul 2023 04:53) (116/69 - 124/62)  BP(mean): --  ABP: --  ABP(mean): --  RR: 18 (09 Jul 2023 04:53) (18 - 18)  SpO2: 99% (09 Jul 2023 06:00) (99% - 100%)    O2 Parameters below as of 08 Jul 2023 11:25  Patient On (Oxygen Delivery Method): ventilator            General:awake   HEENT:     trach            Lymph Nodes: NO cervical LN   Lungs: Bilateral BS  Cardiovascular: Regular   Abdomen: Soft, Positive BS  Extremities: No clubbing   Skin: warm   Neurological: no focal   Musculoskeletal: move all ext     I&O's Detail    08 Jul 2023 07:01  -  09 Jul 2023 07:00  --------------------------------------------------------  IN:    Enteral Tube Flush: 120 mL    Peptamen A.F.: 750 mL  Total IN: 870 mL    OUT:    Other (mL): 1000 mL  Total OUT: 1000 mL    Total NET: -130 mL          LABS:                          7.5    13.23 )-----------( 390      ( 08 Jul 2023 10:15 )             23.6         08 Jul 2023 10:15    141    |  102    |  52     ----------------------------<  184    4.2     |  28     |  2.3      Ca    9.0        08 Jul 2023 10:15  Mg     2.2       08 Jul 2023 10:15                                               PT/INR - ( 08 Jul 2023 10:15 )   PT: 12.40 sec;   INR: 1.08 ratio         PTT - ( 08 Jul 2023 10:15 )  PTT:32.7 sec                                       Urinalysis Basic - ( 08 Jul 2023 10:15 )    Color: x / Appearance: x / SG: x / pH: x  Gluc: 184 mg/dL / Ketone: x  / Bili: x / Urobili: x   Blood: x / Protein: x / Nitrite: x   Leuk Esterase: x / RBC: x / WBC x   Sq Epi: x / Non Sq Epi: x / Bacteria: x                                                                                                             Mode: AC/ CMV (Assist Control/ Continuous Mandatory Ventilation)  RR (machine): 14  TV (machine): 450  FiO2: 40  PEEP: 8  ITime: 0.8  MAP: 12  PIP: 24                                          MEDICATIONS  (STANDING):  albuterol/ipratropium for Nebulization 3 milliLiter(s) Nebulizer every 6 hours  aMIOdarone    Tablet 200 milliGRAM(s) Oral daily  aMIOdarone    Tablet   Oral   aspirin  chewable 81 milliGRAM(s) Oral daily  atorvastatin 80 milliGRAM(s) Oral at bedtime  buMETAnide 2 milliGRAM(s) Oral daily  chlorhexidine 0.12% Liquid 15 milliLiter(s) Oral Mucosa two times a day  chlorhexidine 2% Cloths 1 Application(s) Topical <User Schedule>  darbepoetin Injectable Syringe 25 MICROGram(s) IV Push <User Schedule>  dextrose 5%. 1000 milliLiter(s) (50 mL/Hr) IV Continuous <Continuous>  dextrose 5%. 1000 milliLiter(s) (100 mL/Hr) IV Continuous <Continuous>  dextrose 50% Injectable 25 Gram(s) IV Push once  dextrose 50% Injectable 25 Gram(s) IV Push once  dextrose 50% Injectable 12.5 Gram(s) IV Push once  glucagon  Injectable 1 milliGRAM(s) IntraMuscular once  heparin   Injectable 5000 Unit(s) SubCutaneous every 12 hours  insulin lispro (ADMELOG) corrective regimen sliding scale   SubCutaneous three times a day before meals  levETIRAcetam  Solution 500 milliGRAM(s) Enteral Tube two times a day  pantoprazole    Tablet 40 milliGRAM(s) Oral two times a day    MEDICATIONS  (PRN):  acetaminophen     Tablet .. 650 milliGRAM(s) Oral every 6 hours PRN Temp greater or equal to 38C (100.4F), Mild Pain (1 - 3)  dextrose Oral Gel 15 Gram(s) Oral once PRN Blood Glucose LESS THAN 70 milliGRAM(s)/deciliter  sodium chloride 0.9% Bolus. 100 milliLiter(s) IV Bolus every 5 minutes PRN SBP LESS THAN or EQUAL to 90 mmHg  sodium chloride 0.9% Bolus. 100 milliLiter(s) IV Bolus every 5 minutes PRN SBP LESS THAN or EQUAL to 100 mmHg  sodium chloride 0.9% Bolus. 100 milliLiter(s) IV Bolus every 5 minutes PRN SBP LESS THAN or EQUAL to 100 mmHg  sodium chloride 0.9% Bolus. 100 milliLiter(s) IV Bolus every 5 minutes PRN SBP LESS THAN or EQUAL to 80 mmHg          Xrays:  TLC:  OG:  ET tube:                                                                                       ECHO:  [facility] ICU:

## 2023-07-10 NOTE — PROGRESS NOTE ADULT - SUBJECTIVE AND OBJECTIVE BOX
Over Night Events: events noted, vent dependant, low grade fever    PHYSICAL EXAM    ICU Vital Signs Last 24 Hrs  T(C): 37.4 (09 Jul 2023 15:03), Max: 37.4 (09 Jul 2023 15:03)  T(F): 99.4 (09 Jul 2023 15:03), Max: 99.4 (09 Jul 2023 15:03)  HR: 80 (10 Jul 2023 04:23) (80 - 90)  BP: 120/65 (09 Jul 2023 15:03) (120/65 - 120/65)  RR: 18 (09 Jul 2023 15:03) (18 - 18)  SpO2: 99% (10 Jul 2023 04:23) (98% - 100%)    O2 Parameters below as of 09 Jul 2023 15:03  Patient On (Oxygen Delivery Method): ventilator            General: ill looking  HEENT: trach  Lungs: dec bs both bases  Cardiovascular: HERI 2,6  Abdomen: Soft, Positive BS  unresponsive       07-08-23 @ 07:01  -  07-09-23 @ 07:00  --------------------------------------------------------  IN:    Enteral Tube Flush: 120 mL    Peptamen A.F.: 750 mL  Total IN: 870 mL    OUT:    Other (mL): 1000 mL  Total OUT: 1000 mL    Total NET: -130 mL      07-09-23 @ 07:01  -  07-10-23 @ 05:57  --------------------------------------------------------  IN:    Enteral Tube Flush: 120 mL    Peptamen A.F.: 750 mL  Total IN: 870 mL    OUT:  Total OUT: 0 mL    Total NET: 870 mL          LABS:                          7.5    13.23 )-----------( 390      ( 08 Jul 2023 10:15 )             23.6                                               07-08    141  |  102  |  52<H>  ----------------------------<  184<H>  4.2   |  28  |  2.3<H>    Ca    9.0      08 Jul 2023 10:15  Mg     2.2     07-08    TPro  5.4<L>  /  Alb  2.0<L>  /  TBili  0.3  /  DBili  x   /  AST  17  /  ALT  16  /  AlkPhos  160<H>  07-08      PT/INR - ( 08 Jul 2023 10:15 )   PT: 12.40 sec;   INR: 1.08 ratio         PTT - ( 08 Jul 2023 10:15 )  PTT:32.7 sec                                       Urinalysis Basic - ( 08 Jul 2023 10:15 )    Color: x / Appearance: x / SG: x / pH: x  Gluc: 184 mg/dL / Ketone: x  / Bili: x / Urobili: x   Blood: x / Protein: x / Nitrite: x   Leuk Esterase: x / RBC: x / WBC x   Sq Epi: x / Non Sq Epi: x / Bacteria: x                                                  LIVER FUNCTIONS - ( 08 Jul 2023 10:15 )  Alb: 2.0 g/dL / Pro: 5.4 g/dL / ALK PHOS: 160 U/L / ALT: 16 U/L / AST: 17 U/L / GGT: x                                                                                               Mode: AC/ CMV (Assist Control/ Continuous Mandatory Ventilation)  RR (machine): 14  TV (machine): 450  FiO2: 40  PEEP: 8  ITime: 0.8  MAP: 12  PIP: 25                                          MEDICATIONS  (STANDING):  albuterol/ipratropium for Nebulization 3 milliLiter(s) Nebulizer every 6 hours  aMIOdarone    Tablet 200 milliGRAM(s) Oral daily  aMIOdarone    Tablet   Oral   aspirin  chewable 81 milliGRAM(s) Oral daily  atorvastatin 80 milliGRAM(s) Oral at bedtime  buMETAnide 2 milliGRAM(s) Oral daily  chlorhexidine 0.12% Liquid 15 milliLiter(s) Oral Mucosa two times a day  chlorhexidine 2% Cloths 1 Application(s) Topical <User Schedule>  darbepoetin Injectable Syringe 25 MICROGram(s) IV Push <User Schedule>  dextrose 5%. 1000 milliLiter(s) (50 mL/Hr) IV Continuous <Continuous>  dextrose 5%. 1000 milliLiter(s) (100 mL/Hr) IV Continuous <Continuous>  dextrose 50% Injectable 12.5 Gram(s) IV Push once  dextrose 50% Injectable 25 Gram(s) IV Push once  dextrose 50% Injectable 25 Gram(s) IV Push once  glucagon  Injectable 1 milliGRAM(s) IntraMuscular once  heparin   Injectable 5000 Unit(s) SubCutaneous every 12 hours  insulin lispro (ADMELOG) corrective regimen sliding scale   SubCutaneous three times a day before meals  levETIRAcetam  Solution 500 milliGRAM(s) Enteral Tube two times a day  pantoprazole    Tablet 40 milliGRAM(s) Oral two times a day    MEDICATIONS  (PRN):  acetaminophen     Tablet .. 650 milliGRAM(s) Oral every 6 hours PRN Temp greater or equal to 38C (100.4F), Mild Pain (1 - 3)  dextrose Oral Gel 15 Gram(s) Oral once PRN Blood Glucose LESS THAN 70 milliGRAM(s)/deciliter  sodium chloride 0.9% Bolus. 100 milliLiter(s) IV Bolus every 5 minutes PRN SBP LESS THAN or EQUAL to 80 mmHg  sodium chloride 0.9% Bolus. 100 milliLiter(s) IV Bolus every 5 minutes PRN SBP LESS THAN or EQUAL to 100 mmHg  sodium chloride 0.9% Bolus. 100 milliLiter(s) IV Bolus every 5 minutes PRN SBP LESS THAN or EQUAL to 100 mmHg  sodium chloride 0.9% Bolus. 100 milliLiter(s) IV Bolus every 5 minutes PRN SBP LESS THAN or EQUAL to 90 mmHg

## 2023-07-10 NOTE — PROGRESS NOTE ADULT - ASSESSMENT
IMPRESSION:    Sepsis present on admission resolved  Candida UTI sp tx  Pseudomonas PNA sp abx  anemia  left side atelectasis improved  Bilateral pleural effusions likely volume overload   MICHELLE on CKD III on dialysis   NSTEMI likely type 2  Hyponatremia resolved   Paroxysmal Afib  Acute on chronic anemia suspected UGI Bleed  Chronic respiratory failure  H/o ICH s/p trach and PEG  H/o CAD      PLAN:    CNS: Avoid CNS depressants     HEENT: Oral and trach care    PULMONARY: HOB 45. Aspiration.  No vent changes.  Goal saturation 92-96%. Vent dependent.  Pulmonary toilet. not weanable    CARDIOVASCULAR: Avoid overload.      GI:  Feeding  Bowel regimen     RENAL: trend CMP.  Correct as needed.  Nephrology following. HD per renal     INFECTIOUS DISEASE: ABX per ID.      HEMATOLOGICAL: DVT prophylaxis.   Monitor CBC.     ENDOCRINE: Follow up FS. Insulin protocol if needed.    DNR    Poor prognosis   dc planning

## 2023-07-10 NOTE — DISCHARGE NOTE PROVIDER - CARE PROVIDER_API CALL
Uvaldo Frey  Internal Medicine  [address] [place] [id number]  Phone: [phone]  Fax: [phone]  Established Patient  Follow Up Time: 1 week    Lj Mccarthy  Gastroenterology  [address] [place] [address]  Phone: [phone]  Fax: [phone]  Follow Up Time: 1 month    Berta Rqoue  Nephrology  [address] [place] [address]  Phone: [phone]  Fax: [phone]  Follow Up Time: 1 week   Uvaldo Frey  Internal Medicine  [address] [place] [address]  Phone: [phone]  Fax: [phone]  Established Patient  Follow Up Time: 1 week    Lj Mccarthy  Gastroenterology  [address] [place] [address]  Phone: [phone]  Fax: [phone]  Follow Up Time: 1 month    Berta Roque  Nephrology  [address] [place] [address]  Phone: [phone]  Fax: [phone]  Follow Up Time: 1 week    Antonino Mendoza  Pulmonary Disease  [address] [place] [address]  Phone: [phone]  Fax: [phone]  Follow Up Time: 1 week

## 2023-07-10 NOTE — DISCHARGE NOTE PROVIDER - HOSPITAL COURSE
HPI:  64 yo M with  PMH of ICH (2021) s/p trach and PEG, HTN, HLD, T2DM, CAD s/p stents, Recurrent C diff, BIBEMS from [facility] for abnormal labs. Patient non-verbal at baseline - limited history. Per NH chart have a BUN greater than 200 and creatinine of 4.3 on outpatient labs. Outpatient CXR also w/ new L sided pleural effusion. With EMS patient was also found to be in irregular rhythm, no known history of A-fib or a flutter.  In ED patient had borderline BP, and was in Atrial fibrillation. He was started on amiodarone gtt. Labs showed , Cr. 4.1, Hb 6.6. Trops 1.8. He was given 1U PRBC, gastric lavage revealed coffee ground emesis w/no active bleeding. He was started on PPI drip and GI consulted in ED. He was given IV aztreonam and vancomycin.   Patient admitted to ICU for management of Sepsis from UTI, MICHELLE likely prerenal from anemia and diarrhea and NSTEMI. TTE with EF of 63%, G1DD. NSTEMI stable, cardiology recommended medical management with aspirin and statin. Found to have Candiduria s/p fluconzaole and ceftolozane-tazobactam, DKA s/p insulin drip (currently on glargine 10units at bedtime with FS in 130s-170s), acute rental failure with urine production s/p R IJ U-dall placed on 6/1, hyperkalemia requiring HD and Lokelma, acute anemia requiring multiple transfusions (most recent 6/28, Hgb 6.5->7.8). Nephrology continuing to follow, TDC was placed on 7/7 after receiving a blood transfusion (1 unit PRBC for Hb<7)      Discharge A/P:  # Acute Renal Failure, started on HD 6/1, as per Renal team rec.   - patient is hypotensive- Bumex transitioned to PO 2 mg once daily to avoid symptomatic hypotension  - s/p permanent HD cath. placement on 7/7/23, now ready for discharge    # anemia of chronic disease - no gross bleeding events- 7/7- s/p 1 unit PRBC, 7/8 hg- 7.5  # Hypotension- d/c Cardizem, placed holding parameters on Bumex, am serum Cortisol level- 12.3   # chronic respiratory failure, vent dependant via trach , trach care  # Chronic dysphagia, sp peg, peg care   #Candiduria with U cx C tropicalis  # Afib w/ RVR - now rate controlled # Acute Decompensated CHF  #CAD s/p stents  #NSTEMI, medical management   # DKA, resolved  # Hx ICH- Bedbound from NH- - c/w keppra 500mg BID for seizure prophylaxis  # multiple sacral wounds. sacral and buttock, frequent body position change, decub. prevention tx.   # hx of ICH, chronic non-verbal , quadriplegia - continue daily care.   # h/o DM 2- low bsfs - d/c lantus, continue bsfs monitoring     Patient is medically stable and ready for discharge. HPI:  64 yo M with  PMH of ICH (2021) s/p trach and PEG, HTN, HLD, T2DM, CAD s/p stents, Recurrent C diff, BIBEMS from [facility] for abnormal labs. Patient non-verbal at baseline - limited history. Per NH chart have a BUN greater than 200 and creatinine of 4.3 on outpatient labs. Outpatient CXR also w/ new L sided pleural effusion. With EMS patient was also found to be in irregular rhythm, no known history of A-fib or a flutter.  In ED patient had borderline BP, and was in Atrial fibrillation. He was started on amiodarone gtt. Labs showed , Cr. 4.1, Hb 6.6. Trops 1.8. He was given 1U PRBC, gastric lavage revealed coffee ground emesis w/no active bleeding. He was started on PPI drip and GI consulted in ED. He was given IV aztreonam and vancomycin.   Patient admitted to ICU for management of Sepsis from UTI, MICHELLE likely prerenal from anemia and diarrhea and NSTEMI. TTE with EF of 63%, G1DD. NSTEMI stable, cardiology recommended medical management with aspirin and statin. Found to have Candiduria s/p fluconzaole and ceftolozane-tazobactam, DKA s/p insulin drip (currently on glargine 10units at bedtime with FS in 130s-170s), acute rental failure with urine production s/p R IJ U-dall placed on 6/1, hyperkalemia requiring HD and Lokelma, acute anemia requiring multiple transfusions (most recent 6/28, Hgb 6.5->7.8). Nephrology continuing to follow, TDC was placed on 7/7 after receiving a blood transfusion (1 unit PRBC for Hb<7)      Discharge A/P:  # Acute Renal Failure, started on HD 6/1, as per Renal team rec.   - patient is hypotensive- Bumex transitioned to PO 2 mg once daily to avoid symptomatic hypotension  - s/p permanent HD cath. placement on 7/7/23, now ready for discharge    # anemia of chronic disease - no gross bleeding events- multiple transfusions, most recent 7/17, 7/19 hg- 7.9  # Hypotension- d/c Cardizem, placed holding parameters on Bumex, am serum Cortisol level- 12.3   # chronic respiratory failure, vent dependant via trach , trach care  # Chronic dysphagia, sp peg, peg care   #Candiduria with U cx C tropicalis  # Afib w/ RVR - now rate controlled # Acute Decompensated CHF  #CAD s/p stents  #NSTEMI, medical management   # DKA, resolved  # Hx ICH- Bedbound from NH- - c/w keppra 500mg BID for seizure prophylaxis  # multiple sacral wounds. sacral and buttock, frequent body position change, decub. prevention tx.   # hx of ICH, chronic non-verbal , quadriplegia - continue daily care.   # h/o DM 2- low bsfs - d/c lantus, continue bsfs monitoring     Patient is medically stable and ready for discharge. HPI:  62 yo M with  PMH of ICH (2021) s/p trach and PEG, HTN, HLD, T2DM, CAD s/p stents, Recurrent C diff, BIBEMS from [facility] for abnormal labs. Patient non-verbal at baseline - limited history. Per NH chart have a BUN greater than 200 and creatinine of 4.3 on outpatient labs. Outpatient CXR also w/ new L sided pleural effusion. With EMS patient was also found to be in irregular rhythm, no known history of A-fib or a flutter.  In ED patient had borderline BP, and was in Atrial fibrillation. He was started on amiodarone gtt. Labs showed , Cr. 4.1, Hb 6.6. Trops 1.8. He was given 1U PRBC, gastric lavage revealed coffee ground emesis w/no active bleeding. He was started on PPI drip and GI consulted in ED. He was given IV aztreonam and vancomycin.     Patient admitted to ICU for management of Sepsis from UTI, MICHELLE likely prerenal from anemia and diarrhea and NSTEMI.   TTE with EF of 63%, G1DD. NSTEMI stable, cardiology recommended medical management with aspirin and statin.   Found to have Candiduria s/p fluconzaole and ceftolozane-tazobactam, DKA s/p insulin drip (currently on glargine 10units at bedtime with FS in 130s-170s),   acute rental failure with urine production s/p R IJ U-dall placed on 6/1, hyperkalemia requiring HD and Lokelma on non-dialysis days,   acute anemia requiring multiple transfusions (most recent 7/17, 7/19 hg- 7.9).   TDC was placed on 7/7, last HD session 7/18, next session should be 7/24 as per nephro    #Acute renal failure  - daily BMP monitoring, daily weight, daily strict I/O chart  - bumex 2 QD, as BP tolerates - avoid symptomatic hypotension  - tx if h <7   - PIYUSH  40 with HD   - lokelma on non dialysis days   - Midodrine prn. tx. prior to HD if b/p is borderline low    Other medical care as below:  # anemia of chronic disease - no gross bleeding events- s/p multiple transfusions, most recent 7/17, 7/19 hg- 7.9    # Hypotension- d/c Cardizem, placed holding parameters on Bumex, am serum Cortisol level- 12.3     # chronic respiratory failure, vent dependant via trach , trach care    # Chronic dysphagia, sp peg, peg care     #Candiduria with U cx C tropicalis     # Afib w/ RVR - now rate controlled, on amiodarone, stop after 329 days    # Acute Decompensated CHF  #CAD s/p stents  #NSTEMI  - medical management     # DKA, resolved    # Hx ICH- Bedbound from NH- - c/w keppra 500mg BID for seizure prophylaxis    # multiple sacral wounds. sacral and buttock, frequent body position change, decub. prevention tx.     # hx of ICH, chronic non-verbal , quadriplegia - continue daily care.     # h/o DM 2- low bsfs - d/c lantus, continue bsfs monitoring     Patient is medically stable and ready for discharge. HPI:  64 yo M with  PMH of ICH (2021) s/p trach and PEG, HTN, HLD, T2DM, CAD s/p stents, Recurrent C diff, BIBEMS from [facility] for abnormal labs. Patient non-verbal at baseline - limited history. Per NH chart have a BUN greater than 200 and creatinine of 4.3 on outpatient labs. Outpatient CXR also w/ new L sided pleural effusion. With EMS patient was also found to be in irregular rhythm, no known history of A-fib or a flutter.  In ED patient had borderline BP, and was in Atrial fibrillation. He was started on amiodarone gtt. Labs showed , Cr. 4.1, Hb 6.6. Trops 1.8. He was given 1U PRBC, gastric lavage revealed coffee ground emesis w/no active bleeding. He was started on PPI drip and GI consulted in ED. He was given IV aztreonam and vancomycin.     Patient admitted to ICU for management of Sepsis from UTI, MICHELLE likely prerenal from anemia and diarrhea and NSTEMI.   TTE with EF of 63%, G1DD. NSTEMI stable, cardiology recommended medical management with aspirin and statin.   Found to have Candiduria s/p fluconzaole and ceftolozane-tazobactam, DKA s/p insulin drip (currently on glargine 10units at bedtime with FS in 130s-170s),   acute rental failure with urine production s/p R IJ U-dall placed on 6/1, hyperkalemia requiring HD and Lokelma on non-dialysis days,   acute anemia requiring multiple transfusions (most recent 7/17, 7/19 hg- 7.9).   TDC was placed on 7/7, last HD session 7/18, next session should be 7/24 as per nephro    Pt will require close follow up with CBC and BMP within 7 days.     #Acute renal failure  - daily BMP monitoring, daily weight, daily strict I/O chart  - bumex 2 QD, as BP tolerates - avoid symptomatic hypotension  - tx if h <7   - PIYUSH  40 with HD   - lokelma on non dialysis days   - Midodrine prn. tx. prior to HD if b/p is borderline low    Other medical care as below:  # anemia of chronic disease - no gross bleeding events- s/p multiple transfusions, most recent 7/17, 7/19 hg- 7.9    # Hypotension- d/c Cardizem, placed holding parameters on Bumex, am serum Cortisol level- 12.3     # chronic respiratory failure, vent dependant via trach , trach care    # Chronic dysphagia, sp peg, peg care     #Candiduria with U cx C tropicalis     # Afib w/ RVR - now rate controlled, on amiodarone, stop after 329 days    # Acute Decompensated CHF  #CAD s/p stents  #NSTEMI  - medical management     # DKA, resolved    # Hx ICH- Bedbound from NH- - c/w keppra 500mg BID for seizure prophylaxis    # multiple sacral wounds. sacral and buttock, frequent body position change, decub. prevention tx.     # hx of ICH, chronic non-verbal , quadriplegia - continue daily care.     # h/o DM 2- low bsfs - d/c lantus, continue bsfs monitoring     Patient is medically stable and ready for discharge.

## 2023-07-10 NOTE — PROGRESS NOTE ADULT - ATTENDING COMMENTS
Patient is a 64 y/o Male  with  PMH of ICH (2021) s/p trach and PEG, HTN, HLD, T2DM, CAD s/p stents, Recurrent C diff, BIBEMS from [facility] for abnormal labs. Patient non-verbal at baseline . Per NH chart  have a BUN greater than 200 and creatinine of 4.3 on outpatient labs. Outpatient CXR also w/ new L sided pleural effusion. In ED, patient was hypotensive with Afib with RVR, found to be in acute renal failure, anemic with elevated trops. He was started on Amiodarone GTT, PPI GTT, gastric lavage with coffee ground secretions, started on broad spectrum abx and admitted to MICU  While in MICU, course complicated by DKA, s/p insulin drip, MICHELLE started on HD, acute anemia s/p PRBC transfusion, NSTEMI, downgraded to SDU -> floor than downgraded to Vent Unit.    # Acute Renal Failure, started on HD 6/1, as per Renal team rec.   - daily BMP monitoring, daily weight, daily strict I/O chart  - patient is hypotensive- Bumex transitioned to PO 2 mg once daily to avoid symptomatic hypotension  - s/p permanent HD cath. placement on 7/7/23  - Midodrine prn. tx. prior to HD if b/p is borderline low    # anemia of chronic disease - no gross bleeding events  - 7/7- s/p 1 unit PRBC, 7/10 hg 7.7  -    # Hyperkalemia- corrected     # Hypotension- d/c Cardizem, placed holding parameters on Bumex, am serum Cortisol level- 12.3       # chronic respiratory failure, vent dependant via trach , trach care,  Vent management per Pulm. team   # Chronic dysphagia, sp peg, peg care   #Candiduria with U cx C tropicalis  # Afib w/ RVR - now rate controlled # Acute Decompensated CHF  CAD s/p stents  NSTEMI, medical management   # DKA, resolved  # Hx ICH   Bedbound from NH   - c/w keppra 500mg BID for seizure prophylaxis  # multiple sacral wounds. sacral and buttock , further wound care as per Wound care specialist report, frequent body position change, decub. prevention tx.     # hx of ICH, chronic non-verbal , quadriplegia - continue daily care.     # h/o DM 2- low bsfs - d/c lantus, continue bsfs monitoring     DVT proph: Heparin subc. tx.     #Progress Note Handoff:  Patient remains with very poor overall prognosis , pending  candida auris swab cxs for placement, HD tx. as per renal team, monitor h/h   Family discussion: yes, medical team Disposition: SNF , once candida auris  swab results will be available     Total time spent to complete patient's bedside assessment, review medical chart, discuss medical plan of care with covering medical team was more than 35 minutes with >50% of time spent face to face with patient, discussion with patient/family and/or coordination of care.

## 2023-07-10 NOTE — DISCHARGE NOTE PROVIDER - PROVIDER TOKENS
PROVIDER:[TOKEN:[[id number]],FOLLOWUP:[1 week],ESTABLISHEDPATIENT:[T]],PROVIDER:[TOKEN:[[id number]],FOLLOWUP:[1 month]],PROVIDER:[TOKEN:[[id number]],FOLLOWUP:[1 week]] PROVIDER:[TOKEN:[[id number]],FOLLOWUP:[1 week],ESTABLISHEDPATIENT:[T]],PROVIDER:[TOKEN:[[id number]],FOLLOWUP:[1 month]],PROVIDER:[TOKEN:[[id number]],FOLLOWUP:[1 week]],PROVIDER:[TOKEN:[[id number]],FOLLOWUP:[1 week]] no

## 2023-07-10 NOTE — PROGRESS NOTE ADULT - ASSESSMENT
· Assessment    62 yo M with  PMH of ICH (2021) s/p trach and PEG, HTN, HLD, T2DM, CAD s/p stents, Recurrent C diff, BIBEMS from [facility] for abnormal labs. Patient non-verbal at baseline - limited history. Per NH chart  have a BUN greater than 200 and creatinine of 4.3 on outpatient labs. Outpatient CXR also w/ new L sided pleural effusion. In ED, patient was hypotensive with Afib with RVR, found to be in acure renal failure, anemic with elevated trops. He was started on Amiodarone GTT, PPI GTT, gastric lavage with coffee ground secretions, started on broad spectrum abx and admitted to MICU  While in MICU, course complicated by DKA, s/p insulin drip, MICHELLE started on HD, acute anemia s/p PRBC transfusion, NSTEMI, downgraded to SDU -> floor. Pending TDC placement for discharge.    # Acute Renal Failure, started on HD 6/1  -Patient cleared by ID for TDC placement. Pending IR  # Mild Hyperkalemia  # Fluid Overload- Improving- c/w Bumex and HD  # HAGMA- resolved  - non oliguric, RBUS without hydro   - started on HD 6/1 via R IJ [place]   - Nephro on board- cw HD  - s/p Midodrine- BP better, s/p Sodium Bicarb- acidosis improved  - TTE 6/1 - EF 63%, GIDD   - cw Lokelma and HD for high K- May need long term standing lokelma  -Bladder US: thick-walled bladder, no hydronephrosis, parenchymal disease      # Diarrhea   GI PCR -ve, C.diff -ve   cont dignishield     # chronic respiratory failure,   vent dependant via trach   trach care    # functional dysphagia,   sp peg   peg care     # Candiduria with U cx C tropicalis  # MDRO UTI and PNA-  s/p Fluconazole  s/p Ceftolozane-tazobactam    # Acute anemia  gatric lavage negative   resolved s/p Multiple transfusion    # Hyponatremia-resolved  -likely hypervolemic  -c/w bumex 2mg iv q12hr and HD per nephro    # Afib w/ RVR -   # Acute Decompensated CHF  # CAD s/p stents  # NSTEMI type 2  -now rate controlled- c/w Amio and Cardizem  -CHF resolved s/p diuresis  -Seen by cardio 5/31, recommend c/w asa and stating, medical management for NSTEMI    # DKA, resolved  -s/p insulin drip, now on basal/bolus and tube feeds  Monitor FS     # Hx ICH  # multiple sacral wounds  # functional paraplegia ,   Bedbound from NH  has upper ext contracture total care   c/w keppra 500mg BID for seizure prophylaxis  sacral and buttock wounds , un-stageable  Care instructions per wound care:   Clean sacrum , B/L buttock and upper back wound with vashe wound cleanser, pat dry then apply hydrogel with moist vashe guaze dressing  Q 12 hours   Continue skin barrier  to R heel - monitor progression  Pressure  injury  preventive  measures  Skin  and incontinence care     #Hypotension  Cardizem was discontinued and holding parameters placed on Bumex  AM serum cortisol WNL    #Misc  -DVT prophylaxis: Heparin SQ  -GI prophylaxis: Protonix IV BID  -Diet: Tube feed  -Code status: DNR  -Activity: IAT  -Dispo: Vent unit- SNF when stable    Pending Nephro for long term HD then DC    #Hand off     · Assessment    62 yo M with  PMH of ICH (2021) s/p trach and PEG, HTN, HLD, T2DM, CAD s/p stents, Recurrent C diff, BIBEMS from [facility] for abnormal labs. Patient non-verbal at baseline - limited history. Per NH chart  have a BUN greater than 200 and creatinine of 4.3 on outpatient labs. Outpatient CXR also w/ new L sided pleural effusion. In ED, patient was hypotensive with Afib with RVR, found to be in acure renal failure, anemic with elevated trops. He was started on Amiodarone GTT, PPI GTT, gastric lavage with coffee ground secretions, started on broad spectrum abx and admitted to MICU  While in MICU, course complicated by DKA, s/p insulin drip, MICHELLE started on HD, acute anemia s/p PRBC transfusion, NSTEMI, downgraded to SDU -> floor. Pending TDC placement for discharge.    # Acute Renal Failure, started on HD 6/1  # Mild Hyperkalemia  # Fluid Overload- Improving- c/w Bumex and HD  # HAGMA- resolved  - HD port created 7/7, received HD 7/7  - Nephro on board- likely HD twice a week. Daily BMP, weight, strict I/O  - midodrine 1 hr before HD if SBP <100  - s/p Midodrine- BP better, s/p Sodium Bicarb- acidosis improved  - TTE 6/1 - EF 63%, GIDD   - K+ WNL, off Lokelma     # Diarrhea   GI PCR -ve, C.diff -not detected    cont dignishield     # chronic respiratory failure,   vent dependant via trach   trach care    # functional dysphagia,   sp peg   peg care     # Candiduria with U cx C tropicalis  # MDRO UTI and PNA-  s/p Fluconazole  s/p Ceftolozane-tazobactam    # anemia of chronic disease  - iron WNL  - monitor H/H, keep Hgb > 7  - received multiple blood transfusions  - last transfusion 7/7    # Hyponatremia-resolved    # Afib w/ RVR -   # Acute Decompensated CHF  # CAD s/p stents  # NSTEMI type 2  -now rate controlled- c/w Amio and Cardizem  -CHF resolved s/p diuresis  -Seen by cardio 5/31, recommend c/w asa and stating, medical management for NSTEMI    # DKA, resolved  -s/p insulin drip, SSI and tube feeds  Monitor FS     # Hx ICH  # multiple sacral wounds  # functional paraplegia ,   Bedbound from NH  has upper ext contracture total care   c/w keppra 500mg BID for seizure prophylaxis  sacral and buttock wounds , un-stageable  Care instructions per wound care:   Clean sacrum , B/L buttock and upper back wound with vashe wound cleanser, pat dry then apply hydrogel with moist vashe guaze dressing  Q 12 hours   Continue skin barrier  to R heel - monitor progression  Pressure  injury  preventive  measures  Skin  and incontinence care     #Hypotension  Cardizem was discontinued and holding parameters placed on Bumex  AM serum cortisol WNL    #Misc  -DVT prophylaxis: Heparin SQ  -GI prophylaxis: Protonix IV BID  -Diet: Tube feed  -Code status: DNR  -Activity: IAT  -Dispo: pending    #Progress Note Handoff: monitor daily  CBC post blood transfusion 7/7, BMP in am  , bsfs,  monitor VS,  HD as per Renal team. Patient remains with very poor overall prognosis , pending  candida auris swab cxs for placement  Family discussion: yes, medical team Disposition: SNF once medically stable

## 2023-07-10 NOTE — DISCHARGE NOTE PROVIDER - CARE PROVIDERS DIRECT ADDRESSES
,DirectAddress_Unknown,DirectAddress_Unknown,DirectAddress_Unknown ,[email or web address],DirectAddress_Unknown,[email or web address],DirectAddress_Unknown

## 2023-07-10 NOTE — DISCHARGE NOTE PROVIDER - NSDCCPCAREPLAN_GEN_ALL_CORE_FT
PRINCIPAL DISCHARGE DIAGNOSIS  Diagnosis: MICHELLE (acute kidney injury)  Assessment and Plan of Treatment: You were noted to have a insult to your kidney function at the time that you arrived at the hospital. We have monitored your kidney function with blood work during your time here and you were not improving so dialysis was started. We do recommend that you follow up to continually have your kidney function checked. You should follow up with a nephrologist regarding further dialysis after discharge.      SECONDARY DISCHARGE DIAGNOSES  Diagnosis: Acute UTI  Assessment and Plan of Treatment: You had urinary infection, for which we gave you antibiotics and you improved.    Diagnosis: Atrial flutter with rapid ventricular response  Assessment and Plan of Treatment: You had rapid heart rate, for which we gave you medication to slow down your heart after which the heart rate was controlled    Diagnosis: DKA (diabetic ketoacidosis)  Assessment and Plan of Treatment: Your diabetes worsened in this admission and you went into diabetic ketoacidosis, we gave you high dose insulin after which your diabetes ketoacidosis improved.    Diagnosis: GIB (gastrointestinal bleeding)  Assessment and Plan of Treatment: You also had possible bleedingjfrom your stomach for which we gave your high dose pantoprazole after which you hemoglobin remained stable.    Diagnosis: NSTEMI (non-ST elevated myocardial infarction)  Assessment and Plan of Treatment:     Diagnosis: Septic shock  Assessment and Plan of Treatment:      PRINCIPAL DISCHARGE DIAGNOSIS  Diagnosis: MICHELLE (acute kidney injury)  Assessment and Plan of Treatment: You were noted to have a insult to your kidney function at the time that you arrived at the hospital. We have monitored your kidney function with blood work during your time here and you were not improving so dialysis was started. We do recommend that you follow up to continually have your kidney function checked. You should follow up with a nephrologist regarding further dialysis after discharge. last HD session 7/18, next session should be 7/24 as per nephro      SECONDARY DISCHARGE DIAGNOSES  Diagnosis: Acute UTI  Assessment and Plan of Treatment: You had urinary infection, for which we gave you antibiotics and you improved.    Diagnosis: Atrial flutter with rapid ventricular response  Assessment and Plan of Treatment: You had rapid heart rate, for which we gave you medication to slow down your heart after which the heart rate was controlled.    Diagnosis: Septic shock  Assessment and Plan of Treatment:     Diagnosis: DKA (diabetic ketoacidosis)  Assessment and Plan of Treatment: Your diabetes worsened in this admission and you went into diabetic ketoacidosis, we gave you high dose insulin after which your diabetes ketoacidosis improved.    Diagnosis: NSTEMI (non-ST elevated myocardial infarction)  Assessment and Plan of Treatment:     Diagnosis: GIB (gastrointestinal bleeding)  Assessment and Plan of Treatment: You also had possible bleedingjfrom your stomach for which we gave your high dose pantoprazole.    Diagnosis: Anemia of chronic disease  Assessment and Plan of Treatment: You were found to have anemia. You required multiple blood transfusions during your hospital stay. Your blood level sould be monitored out patient for possible need of additional transfusions.

## 2023-07-10 NOTE — PROGRESS NOTE ADULT - ASSESSMENT
64 YO F  PMH of ICH (2021) s/p trach and PEG, HTN, HLD, T2DM, CAD s/p stents, Recurrent C diff, BIBEMS from [facility] for abnormal labs.   MICHELLE on CKD 3a - severe azotemia  likely due to GIB / hypotension  Started HD on 6/1/23  Acute anemia d/t UGIB  Troponemia  Lactic acidosis resolved   DKA resolved   Afib   -  HD was on saturday   / might be able to do hd twice a week   - bumex 2 q 12 / document UO   -   last phos 2.1 not on binders   -  h/h noted / tx if h <7 / sat elevated / PIYUSH  25 with HD   -  feed : nepro k noted / off lokelma   - s/p TDC   - start midodrine 5 q 8   - very poor prognosis   will follow

## 2023-07-10 NOTE — PROGRESS NOTE ADULT - PROBLEM SELECTOR PLAN 6
found to be in urinary retention requiring intermittent cath  MICHELLE resolved  - monitor urine output, renal function  - c/w doxazosin impairments found/rehab potential/therapy frequency/predicted duration of therapy intervention/anticipated equipment needs at discharge/anticipated discharge recommendation

## 2023-07-10 NOTE — PROGRESS NOTE ADULT - SUBJECTIVE AND OBJECTIVE BOX
seen and examined  24 h events noted   trach/vent         PAST HISTORY  --------------------------------------------------------------------------------  No significant changes to PMH, PSH, FHx, SHx, unless otherwise noted    ALLERGIES & MEDICATIONS  --------------------------------------------------------------------------------  Allergies    penicillin (Other)    Intolerances      Standing Inpatient Medications  albuterol/ipratropium for Nebulization 3 milliLiter(s) Nebulizer every 6 hours  aMIOdarone    Tablet   Oral   aMIOdarone    Tablet 200 milliGRAM(s) Oral daily  aspirin  chewable 81 milliGRAM(s) Oral daily  atorvastatin 80 milliGRAM(s) Oral at bedtime  buMETAnide 2 milliGRAM(s) Oral daily  chlorhexidine 0.12% Liquid 15 milliLiter(s) Oral Mucosa two times a day  chlorhexidine 2% Cloths 1 Application(s) Topical <User Schedule>  darbepoetin Injectable Syringe 25 MICROGram(s) IV Push <User Schedule>  dextrose 5%. 1000 milliLiter(s) IV Continuous <Continuous>  dextrose 5%. 1000 milliLiter(s) IV Continuous <Continuous>  dextrose 50% Injectable 25 Gram(s) IV Push once  dextrose 50% Injectable 25 Gram(s) IV Push once  dextrose 50% Injectable 12.5 Gram(s) IV Push once  glucagon  Injectable 1 milliGRAM(s) IntraMuscular once  heparin   Injectable 5000 Unit(s) SubCutaneous every 12 hours  insulin lispro (ADMELOG) corrective regimen sliding scale   SubCutaneous three times a day before meals  levETIRAcetam  Solution 500 milliGRAM(s) Enteral Tube two times a day  pantoprazole    Tablet 40 milliGRAM(s) Oral two times a day    PRN Inpatient Medications  acetaminophen     Tablet .. 650 milliGRAM(s) Oral every 6 hours PRN  dextrose Oral Gel 15 Gram(s) Oral once PRN  sodium chloride 0.9% Bolus. 100 milliLiter(s) IV Bolus every 5 minutes PRN  sodium chloride 0.9% Bolus. 100 milliLiter(s) IV Bolus every 5 minutes PRN  sodium chloride 0.9% Bolus. 100 milliLiter(s) IV Bolus every 5 minutes PRN  sodium chloride 0.9% Bolus. 100 milliLiter(s) IV Bolus every 5 minutes PRN        VITALS/PHYSICAL EXAM  --------------------------------------------------------------------------------  T(C): 37.2 (07-10-23 @ 05:00), Max: 37.4 (07-09-23 @ 15:03)  HR: 80 (07-10-23 @ 05:00) (80 - 90)  BP: 109/57 (07-10-23 @ 05:00) (109/57 - 120/65)  RR: 18 (07-10-23 @ 05:00) (18 - 18)  SpO2: 99% (07-10-23 @ 05:00) (98% - 100%)  Wt(kg): --        07-09-23 @ 07:01  -  07-10-23 @ 07:00  --------------------------------------------------------  IN: 870 mL / OUT: 0 mL / NET: 870 mL      Physical Exam:  	Gen: trach/vent   	Pulm:  B/L aline   	CV:  S1S2; no rub  	Abd: +distended  	LE: edema  	Vascular access: TDC     LABS/STUDIES  --------------------------------------------------------------------------------              7.7    11.77 >-----------<  314      [07-10-23 @ 06:15]              24.2     141  |  102  |  52  ----------------------------<  184      [07-08-23 @ 10:15]  4.2   |  28  |  2.3        Ca     9.0     [07-08-23 @ 10:15]      Mg     2.2     [07-08-23 @ 10:15]    TPro  5.4  /  Alb  2.0  /  TBili  0.3  /  DBili  x   /  AST  17  /  ALT  16  /  AlkPhos  160  [07-08-23 @ 10:15]    PT/INR: PT 12.40, INR 1.08       [07-08-23 @ 10:15]  PTT: 32.7       [07-08-23 @ 10:15]      Creatinine Trend:  SCr 2.3 [07-08 @ 10:15]  SCr 2.0 [07-07 @ 10:40]  SCr 2.0 [07-07 @ 07:07]  SCr 1.7 [07-06 @ 17:44]  SCr 2.3 [07-05 @ 12:07]    Urinalysis - [07-08-23 @ 10:15]      Color  / Appearance  / SG  / pH       Gluc 184 / Ketone   / Bili  / Urobili        Blood  / Protein  / Leuk Est  / Nitrite       RBC  / WBC  / Hyaline  / Gran  / Sq Epi  / Non Sq Epi  / Bacteria       Iron 51, TIBC 111, %sat 46      [06-06-23 @ 10:40]  Ferritin 1956      [06-06-23 @ 10:40]  Vitamin D (25OH) 70      [06-03-23 @ 06:50]

## 2023-07-10 NOTE — DISCHARGE NOTE PROVIDER - NSDCMRMEDTOKEN_GEN_ALL_CORE_FT
albuterol 2.5 mg/3 mL (0.083%) inhalation solution: 1 unit(s) inhaled every 4 hours, As Needed  ascorbic acid 500 mg oral tablet: 1 tab(s) orally once a day  aspirin 81 mg oral tablet, chewable: 1 tab(s) orally once a day  atorvastatin 80 mg oral tablet: 1 tab(s) orally once a day  Atrovent 18 mcg/inh inhalation aerosol: 1 unit(s) inhaled every 6 hours  Bactrim  mg-160 mg oral tablet: 1 tablet with sensor orally every 12 hours  chlorhexidine 0.12% mucous membrane liquid: 15 milliliter(s) buccal 2 times a day  doxazosin 4 mg oral tablet: 1 tab(s) orally once a day (at bedtime)  epoetin guan[place]ito 40,000 units/mL injectable solution: 1 milliliter(s) injectable once a week  ergocalciferol 200 mcg/mL (8000 intl units/mL) oral solution: 0.25 milliliter(s) orally once a day  ferrous sulfate 220 mg/5 mL (44 mg/5 mL elemental iron) oral elixir: 7.5 milliliter(s) by gastrostomy tube once a day  folic acid 1 mg oral tablet: 1 tab(s) orally once a day  furosemide 40 mg oral tablet: 1 tab(s) orally once a day  imipenem-cilastatin 500 mg injection: 500 milligram(s) injectable every 6 hours  insulin lispro 100 units/mL injectable solution: injectable Sliding scale  Keppra 100 mg/mL oral solution: 5 milliliter(s) by gastrostomy tube 2 times a day  midodrine 5 mg oral tablet: 1 tab(s) orally 3 times a day  Multiple Vitamins oral tablet: 1 tab(s) orally once a day via PEG  pantoprazole 40 mg oral delayed release tablet: 1 tab(s) orally 2 times a day  SSD 1% topical cream: Apply topically to affected area every 12 hours  Tylenol 325 mg oral tablet: 2 tab(s) orally once a day  zinc sulfate 220 mg oral tablet: 1 tab(s) orally once a day   albuterol 2.5 mg/3 mL (0.083%) inhalation solution: 1 unit(s) inhaled every 4 hours, As Needed  amiodarone 200 mg oral tablet: 200 milligram(s) orally once a day  ascorbic acid 500 mg oral tablet: 1 tab(s) orally once a day  aspirin 81 mg oral tablet, chewable: 1 tab(s) orally once a day  atorvastatin 80 mg oral tablet: 1 tab(s) orally once a day  Atrovent 18 mcg/inh inhalation aerosol: 1 unit(s) inhaled every 6 hours  bumetanide 2 mg oral tablet: 1 tab(s) orally once a day Hold if SBP &lt; 100 or for symptomatic hypotension  chlorhexidine 0.12% mucous membrane liquid: 15 milliliter(s) buccal 2 times a day  collagenase 250 units/g topical ointment: 1 Apply topically to affected area 2 times a day  doxazosin 4 mg oral tablet: 1 tab(s) orally once a day (at bedtime)  epoetin guan[place] 40,000 units/mL injectable solution: 1 milliliter(s) injectable once a week  ergocalciferol 200 mcg/mL (8000 intl units/mL) oral solution: 0.25 milliliter(s) orally once a day  ferrous sulfate 220 mg/5 mL (44 mg/5 mL elemental iron) oral elixir: 7.5 milliliter(s) by gastrostomy tube once a day  folic acid 1 mg oral tablet: 1 tab(s) orally once a day  insulin lispro 100 units/mL injectable solution: injectable Sliding scale  Keppra 100 mg/mL oral solution: 5 milliliter(s) by gastrostomy tube 2 times a day  midodrine 5 mg oral tablet: 1 tab(s) orally once a day as needed for hypotension give PRN before HD if SBP &lt; 100  Multiple Vitamins oral tablet: 1 tab(s) orally once a day via PEG  pantoprazole 40 mg oral delayed release tablet: 1 tab(s) orally 2 times a day  sodium zirconium cyclosilicate: 5 billion vector genomes by PEG tube once a day On Non-dialysis days only and hold for potassium &lt; 4.0  SSD 1% topical cream: Apply topically to affected area every 12 hours  Tylenol 325 mg oral tablet: 2 tab(s) orally once a day  zinc sulfate 220 mg oral tablet: 1 tab(s) orally once a day   acetaminophen 325 mg oral tablet: 2 tab(s) orally every 6 hours As needed Temp greater or equal to 38C (100.4F), Mild Pain (1 - 3)  albuterol 2.5 mg/3 mL (0.083%) inhalation solution: 1 unit(s) inhaled every 4 hours, As Needed  amiodarone 200 mg oral tablet: 200 milligram(s) orally once a day  ascorbic acid 500 mg oral tablet: 1 tab(s) orally once a day  aspirin 81 mg oral tablet, chewable: 1 tab(s) orally once a day  atorvastatin 80 mg oral tablet: 1 tab(s) orally once a day  Atrovent 18 mcg/inh inhalation aerosol: 1 unit(s) inhaled every 6 hours  bumetanide 2 mg oral tablet: 1 tab(s) orally once a day Hold if SBP &lt; 100 or for symptomatic hypotension  chlorhexidine 0.12% mucous membrane liquid: 15 milliliter(s) buccal 2 times a day  collagenase 250 units/g topical ointment: 1 Apply topically to affected area 2 times a day  doxazosin 4 mg oral tablet: 1 tab(s) orally once a day (at bedtime)  epoetin guan[place]ito 40,000 units/mL injectable solution: 1 milliliter(s) injectable once a week  ergocalciferol 200 mcg/mL (8000 intl units/mL) oral solution: 0.25 milliliter(s) orally once a day  ferrous sulfate 220 mg/5 mL (44 mg/5 mL elemental iron) oral elixir: 7.5 milliliter(s) by gastrostomy tube once a day  folic acid 1 mg oral tablet: 1 tab(s) orally once a day  Keppra 100 mg/mL oral solution: 5 milliliter(s) by gastrostomy tube 2 times a day  Multiple Vitamins oral tablet: 1 tab(s) orally once a day via PEG  pantoprazole 40 mg oral delayed release tablet: 1 tab(s) orally 2 times a day  SSD 1% topical cream: Apply topically to affected area every 12 hours  zinc sulfate 220 mg oral tablet: 1 tab(s) orally once a day

## 2023-07-10 NOTE — DISCHARGE NOTE PROVIDER - INSTRUCTIONS
Gastrostomy tube: Peptamen A.F. Formula, 375cc q 6hrs, Free water flush 30cc before and after feed Gastrostomy tube: Peptamen A.F. Formula, 375cc q 6hrs, Free water flush 30cc before and after feed    - check poc glucose prior to each feeding  - f/u phos with pre-HD labs  - limit pre and post feed flushes to 30 ml

## 2023-07-10 NOTE — PROGRESS NOTE ADULT - SUBJECTIVE AND OBJECTIVE BOX
ALICIA BARBOUR 63y Male  MRN#: [id number]     Hospital Day: 41d    Pt is currently admitted with the primary diagnosis of     Overnight events   -No major overnight events                                          ----------------------------------------------------------  OBJECTIVE  PAST MEDICAL & SURGICAL HISTORY  HTN (hypertension)    Diabetes mellitus    H/O intracranial hemorrhage    H/O tracheostomy    PEG (percutaneous endoscopic gastrostomy) status    Hyperlipidemia                                              -----------------------------------------------------------  MEDICATIONS:  STANDING MEDICATIONS  albuterol/ipratropium for Nebulization 3 milliLiter(s) Nebulizer every 6 hours  aMIOdarone    Tablet 200 milliGRAM(s) Oral daily  aMIOdarone    Tablet   Oral   aspirin  chewable 81 milliGRAM(s) Oral daily  atorvastatin 80 milliGRAM(s) Oral at bedtime  buMETAnide 2 milliGRAM(s) Oral daily  chlorhexidine 0.12% Liquid 15 milliLiter(s) Oral Mucosa two times a day  chlorhexidine 2% Cloths 1 Application(s) Topical <User Schedule>  darbepoetin Injectable Syringe 25 MICROGram(s) IV Push <User Schedule>  dextrose 5%. 1000 milliLiter(s) IV Continuous <Continuous>  dextrose 5%. 1000 milliLiter(s) IV Continuous <Continuous>  dextrose 50% Injectable 25 Gram(s) IV Push once  dextrose 50% Injectable 25 Gram(s) IV Push once  dextrose 50% Injectable 12.5 Gram(s) IV Push once  glucagon  Injectable 1 milliGRAM(s) IntraMuscular once  heparin   Injectable 5000 Unit(s) SubCutaneous every 12 hours  insulin lispro (ADMELOG) corrective regimen sliding scale   SubCutaneous three times a day before meals  levETIRAcetam  Solution 500 milliGRAM(s) Enteral Tube two times a day  pantoprazole    Tablet 40 milliGRAM(s) Oral two times a day    PRN MEDICATIONS  acetaminophen     Tablet .. 650 milliGRAM(s) Oral every 6 hours PRN  dextrose Oral Gel 15 Gram(s) Oral once PRN  sodium chloride 0.9% Bolus. 100 milliLiter(s) IV Bolus every 5 minutes PRN  sodium chloride 0.9% Bolus. 100 milliLiter(s) IV Bolus every 5 minutes PRN  sodium chloride 0.9% Bolus. 100 milliLiter(s) IV Bolus every 5 minutes PRN  sodium chloride 0.9% Bolus. 100 milliLiter(s) IV Bolus every 5 minutes PRN                                            ------------------------------------------------------------  VITAL SIGNS: Last 24 Hours  T(C): 37.2 (10 Jul 2023 05:00), Max: 37.4 (09 Jul 2023 15:03)  T(F): 99 (10 Jul 2023 05:00), Max: 99.4 (09 Jul 2023 15:03)  HR: 82 (10 Jul 2023 10:28) (80 - 90)  BP: 109/57 (10 Jul 2023 05:00) (109/57 - 120/65)  BP(mean): --  RR: 18 (10 Jul 2023 05:00) (18 - 18)  SpO2: 100% (10 Jul 2023 10:28) (98% - 100%)      07-09-23 @ 07:01  -  07-10-23 @ 07:00  --------------------------------------------------------  IN: 870 mL / OUT: 0 mL / NET: 870 mL                                             --------------------------------------------------------------  LABS:                        7.7    11.77 )-----------( 314      ( 10 Jul 2023 06:15 )             24.2     07-10    139  |  102  |  57<H>  ----------------------------<  124<H>  4.7   |  26  |  2.6<H>    Ca    9.1      10 Jul 2023 06:15    TPro  5.4<L>  /  Alb  2.3<L>  /  TBili  0.3  /  DBili  x   /  AST  16  /  ALT  13  /  AlkPhos  152<H>  07-10      Urinalysis Basic - ( 10 Jul 2023 06:15 )    Color: x / Appearance: x / SG: x / pH: x  Gluc: 124 mg/dL / Ketone: x  / Bili: x / Urobili: x   Blood: x / Protein: x / Nitrite: x   Leuk Esterase: x / RBC: x / WBC x   Sq Epi: x / Non Sq Epi: x / Bacteria: x                                                            --------------------------------------------------------------  PHYSICAL EXAM:  GENERAL: NAD, lying in bed   HEENT: Atraumatic, neck trach in place  LUNGS: B/L air entry, no adventitious breath sounds   HEART: Regular rate and rhythm. Normal s1s2.    ABD: Soft, non-tender, non-distended. Bowel sounds present. Peg in place.   EXT: B/L LE pitting edema, swollen arms and legs    PLAN:    # DVT prophylaxis     # GI prophylaxis     # Diet     # Activity Score (AM-PAC)    #Progress Note Handoff  Pending (specify):  Family discussion:   Disposition:

## 2023-07-11 NOTE — PROGRESS NOTE ADULT - ASSESSMENT
64 YO F  PMH of ICH (2021) s/p trach and PEG, HTN, HLD, T2DM, CAD s/p stents, Recurrent C diff, BIBEMS from [facility] for abnormal labs.   MICHELLE on CKD 3a - severe azotemia  likely due to GIB / hypotension  Started HD on 6/1/23  Acute anemia d/t UGIB  Troponemia  Lactic acidosis resolved   DKA resolved   Afib   -  HD today standard bath uf 1 liter   - bumex 2 q 12 / document UO   -   last phos 2.1 not on binders / repeat   -  h/h noted / tx if h <7 / sat elevated / PIYUSH  25 with HD   -  feed : blake juarez noted / off lokelma   - s/p TDC   - very poor prognosis   will follow

## 2023-07-11 NOTE — PROGRESS NOTE ADULT - SUBJECTIVE AND OBJECTIVE BOX
VIJAYSTEPHON ALICIA  [facility] 3A (Back) 027 A ([facility] 3A (Back))      Patient was evaluated and examined  by bedside, no active events over night time as per covering nurse report        REVIEW OF SYSTEMS: unable to obtain, patient is non-verbal         T(C): , Max: 36.4 (07-11-23 @ 05:17)  HR: 75 (07-11-23 @ 11:33)  BP: 149/69 (07-11-23 @ 11:33)  RR: 16 (07-11-23 @ 11:33)  SpO2: 100% (07-11-23 @ 11:33)  CAPILLARY BLOOD GLUCOSE      POCT Blood Glucose.: 126 mg/dL (11 Jul 2023 12:41)  POCT Blood Glucose.: 121 mg/dL (11 Jul 2023 06:11)  POCT Blood Glucose.: 135 mg/dL (10 Jul 2023 23:53)  POCT Blood Glucose.: 153 mg/dL (10 Jul 2023 21:28)  POCT Blood Glucose.: 148 mg/dL (10 Jul 2023 18:15)      PHYSICAL EXAM:  General: NAD, unresponsive, patient is laying comfortably in bed  HEENT: AT, NC, trach present  Lungs: mild decreased breath sounds  B/L, no wheezing, no rhonchi  CVS: normal S1, S2, RRR, NO M/G/R  Abdomen: soft, bowel sounds present, non-tender, non-distended, peg present  Extremities: contracted extremities, no edema, no clubbing, no cyanosis, positive peripheral pulses b/l  Neuro: non-verbal, quadriplegic  Skin: sacral wound covered with dressing       LAB  CBC  Date: 07-10-23 @ 06:15  Mean cell Qgyfnznrgp43.8  Mean cell Hemoglobin Conc31.8  Mean cell Volum 93.8  Platelet count-Automate 314  RBC Count 2.58  Red Cell Distrib Width15.1  WBC Count11.77  % Albumin, Urine--  Hematocrit 24.2  Hemoglobin 7.7  CBC  Date: 07-08-23 @ 10:15  Mean cell Lhcicrgxij08.0  Mean cell Hemoglobin Conc31.8  Mean cell Volum 94.4  Platelet count-Automate 390  RBC Count 2.50  Red Cell Distrib Width15.9  WBC Count13.23  % Albumin, Urine--  Hematocrit 23.6  Hemoglobin 7.5  CBC  Date: 07-07-23 @ 21:57  Mean cell Jvzmzwcqau86.7  Mean cell Hemoglobin Conc31.5  Mean cell Volum 94.2  Platelet count-Automate 377  RBC Count 2.93  Red Cell Distrib Width15.9  WBC Count10.93  % Albumin, Urine--  Hematocrit 27.6  Hemoglobin 8.7  CBC  Date: 07-07-23 @ 10:40  Mean cell Swykuhdqbu79.7  Mean cell Hemoglobin Conc31.1  Mean cell Volum 95.5  Platelet count-Automate 390  RBC Count 2.22  Red Cell Distrib Width15.1  WBC Count11.51  % Albumin, Urine--  Hematocrit 21.2  Hemoglobin 6.6  CBC  Date: 07-07-23 @ 07:07  Mean cell Bsrqqcguwc50.5  Mean cell Hemoglobin Conc30.7  Mean cell Volum 96.2  Platelet count-Automate 424  RBC Count 2.34  Red Cell Distrib Width15.2  WBC Count12.29  % Albumin, Urine--  Hematocrit 22.5  Hemoglobin 6.9  CBC  Date: 07-05-23 @ 08:24  Mean cell Kqbpnpxscg34.8  Mean cell Hemoglobin Conc31.5  Mean cell Volum 94.7  Platelet count-Automate 379  RBC Count 2.65  Red Cell Distrib Width15.3  WBC Count12.40  % Albumin, Urine--  Hematocrit 25.1  Hemoglobin 7.9    BMP  07-10-23 @ 06:15  Blood Gas Arterial-Calcium,Ionized--  Blood Urea Nitrogen, Serum 57 mg/dL<H> [10 - 20]  Carbon Dioxide, Serum26 mmol/L [17 - 32]  Chloride, Hxngw076 mmol/L [98 - 110]  Creatinie, Serum2.6 mg/dL<H> [0.7 - 1.5]  Glucose, Sflzi630 mg/dL<H> [70 - 99]  Potassium, Serum4.7 mmol/L [3.5 - 5.0]  Sodium, Serum 139 mmol/L [135 - 146]  [facility]  07-08-23 @ 10:15  Blood Gas Arterial-Calcium,Ionized--  Blood Urea Nitrogen, Serum 52 mg/dL<H> [10 - 20]  Carbon Dioxide, Serum28 mmol/L [17 - 32]  Chloride, Mfdys646 mmol/L [98 - 110]  Creatinie, Serum2.3 mg/dL<H> [0.7 - 1.5]  Glucose, Cxhdz753 mg/dL<H> [70 - 99]  Potassium, Serum4.2 mmol/L [3.5 - 5.0]  Sodium, Serum 141 mmol/L [135 - 146]  [facility]  07-07-23 @ 10:40  Blood Gas Arterial-Calcium,Ionized--  Blood Urea Nitrogen, Serum 40 mg/dL<H> [10 - 20]  Carbon Dioxide, Serum29 mmol/L [17 - 32]  Chloride, Mektk949 mmol/L [98 - 110]  Creatinie, Serum2.0 mg/dL<H> [0.7 - 1.5]  Glucose, Serum99 mg/dL [70 - 99]  Potassium, Serum4.0 mmol/L [3.5 - 5.0]  Sodium, Serum 139 mmol/L [135 - 146]  [facility]  07-07-23 @ 07:07  Blood Gas Arterial-Calcium,Ionized--  Blood Urea Nitrogen, Serum 38 mg/dL<H> [10 - 20]  Carbon Dioxide, Serum28 mmol/L [17 - 32]  Chloride, Bnxwz386 mmol/L [98 - 110]  Creatinie, Serum2.0 mg/dL<H> [0.7 - 1.5]  Glucose, Serum89 mg/dL [70 - 99]  Potassium, Serum4.4 mmol/L [3.5 - 5.0]  Sodium, Serum 141 mmol/L [135 - 146]  [facility]  07-06-23 @ 17:44  Blood Gas Arterial-Calcium,Ionized--  Blood Urea Nitrogen, Serum 33 mg/dL<H> [10 - 20]  Carbon Dioxide, Serum27 mmol/L [17 - 32]  Chloride, Flfpe509 mmol/L [98 - 110]  Creatinie, Serum1.7 mg/dL<H> [0.7 - 1.5]  Glucose, Bcwkt063 mg/dL<H> [70 - 99]  Potassium, Serum4.0 mmol/L [3.5 - 5.0]  Sodium, Serum 142 mmol/L [135 - 146]              Microbiology:    Culture - Fungal, Other (collected 07-05-23 @ 12:20)  Source: .Other Other, RIGHT AXILLARY SWAB  Preliminary Report (07-08-23 @ 15:03):    Culture is being performed. Fungal cultures are held for 4 weeks.    Culture - Blood (collected 06-13-23 @ 19:14)  Source: .Blood Blood  Final Report (06-19-23 @ 02:00):    No Growth Final    Culture - Blood (collected 06-11-23 @ 19:18)  Source: .Blood Blood  Final Report (06-16-23 @ 23:01):    No Growth Final    Culture - Sputum (collected 06-11-23 @ 18:28)  Source: Trach Asp Tracheal Aspirate  Gram Stain (06-12-23 @ 07:16):    Moderate polymorphonuclear leukocytes per low power field    Rare Squamous epithelial cells per low power field    Numerous Gram Negative Rods per oil power field    Moderate Gram Positive Rods per oil power field    Few Gram positive cocci in pairs per oil power field  Final Report (06-13-23 @ 18:59):    Three or more mixed gram negative rods including    Moderate Pseudomonas aeruginosa (Carbapenem Resistant)    Normal Respiratory Susan present  Organism: Pseudomonas aeruginosa (Carbapenem Resistant)  Pseudomonas aeruginosa (Carbapenem Resistant) (06-13-23 @ 18:59)  Organism: Pseudomonas aeruginosa (Carbapenem Resistant) (06-13-23 @ 18:59)      Method Type: CarbaR      -  Resistance Gene to Carbapenem: Nondet  Organism: Pseudomonas aeruginosa (Carbapenem Resistant) (06-13-23 @ 18:59)      Method Type: TAMIE      -  Amikacin: S <=16      -  Aztreonam: I 16      -  Cefepime: I 16      -  Ceftazidime: R >16      -  Ceftazidime/Avibactam: S <=4      -  Ceftolozane/tazobactam: S <=2      -  Ciprofloxacin: R >2      -  Gentamicin: S 4      -  Imipenem: R >8      -  Levofloxacin: R >4      -  Meropenem: R >8      -  Piperacillin/Tazobactam: R >64      -  Tobramycin: S <=2    Culture - Urine (collected 06-11-23 @ 18:25)  Source: Catheterized Catheterized  Final Report (06-14-23 @ 18:40):    >100,000 CFU/ml Pseudomonas aeruginosa (Carbapenem Resistant)  Organism: Pseudomonas aeruginosa (Carbapenem Resistant)  Pseudomonas aeruginosa (Carbapenem Resistant) (06-14-23 @ 18:40)  Organism: Pseudomonas aeruginosa (Carbapenem Resistant) (06-14-23 @ 18:40)      Method Type: CarbaR      -  Resistance Gene to Carbapenem: Nondet  Organism: Pseudomonas aeruginosa (Carbapenem Resistant) (06-14-23 @ 18:40)      Method Type: TAMIE      -  Amikacin: S <=16      -  Aztreonam: R >16      -  Cefepime: R >16      -  Ceftazidime: R >16      -  Ceftazidime/Avibactam: S 8      -  Ceftolozane/tazobactam: S <=2      -  Ciprofloxacin: R >2      -  Gentamicin: I 8      -  Imipenem: R >8      -  Levofloxacin: R >4      -  Meropenem: R >8      -  Piperacillin/Tazobactam: R >64      -  Tobramycin: S <=2        Medications:  acetaminophen     Tablet .. 650 milliGRAM(s) Oral every 6 hours PRN  albuterol/ipratropium for Nebulization 3 milliLiter(s) Nebulizer every 6 hours  aMIOdarone    Tablet   Oral   aMIOdarone    Tablet 200 milliGRAM(s) Oral daily  aspirin  chewable 81 milliGRAM(s) Oral daily  atorvastatin 80 milliGRAM(s) Oral at bedtime  buMETAnide 2 milliGRAM(s) Oral daily  chlorhexidine 0.12% Liquid 15 milliLiter(s) Oral Mucosa two times a day  chlorhexidine 2% Cloths 1 Application(s) Topical <User Schedule>  darbepoetin Injectable Syringe 25 MICROGram(s) IV Push <User Schedule>  dextrose 5%. 1000 milliLiter(s) IV Continuous <Continuous>  dextrose 5%. 1000 milliLiter(s) IV Continuous <Continuous>  dextrose 50% Injectable 12.5 Gram(s) IV Push once  dextrose 50% Injectable 25 Gram(s) IV Push once  dextrose 50% Injectable 25 Gram(s) IV Push once  dextrose Oral Gel 15 Gram(s) Oral once PRN  glucagon  Injectable 1 milliGRAM(s) IntraMuscular once  heparin   Injectable 5000 Unit(s) SubCutaneous every 12 hours  insulin lispro (ADMELOG) corrective regimen sliding scale   SubCutaneous three times a day before meals  levETIRAcetam  Solution 500 milliGRAM(s) Enteral Tube two times a day  pantoprazole    Tablet 40 milliGRAM(s) Oral two times a day  sodium chloride 0.9% Bolus. 100 milliLiter(s) IV Bolus every 5 minutes PRN  sodium chloride 0.9% Bolus. 100 milliLiter(s) IV Bolus every 5 minutes PRN        Assessment and Plan:  Patient is a 64 y/o Male  with  PMH of ICH (2021) s/p trach and PEG, HTN, HLD, T2DM, CAD s/p stents, Recurrent C diff, BIBEMS from [facility] for abnormal labs. Patient non-verbal at baseline . Per NH chart  have a BUN greater than 200 and creatinine of 4.3 on outpatient labs. Outpatient CXR also w/ new L sided pleural effusion. In ED, patient was hypotensive with Afib with RVR, found to be in acute renal failure, anemic with elevated trops. He was started on Amiodarone GTT, PPI GTT, gastric lavage with coffee ground secretions, started on broad spectrum abx and admitted to MICU  While in MICU, course complicated by DKA, s/p insulin drip, MICHELLE started on HD, acute anemia s/p PRBC transfusion, NSTEMI, downgraded to SDU -> floor than downgraded to Vent Unit.    # Acute Renal Failure, started on HD 6/1, as per Renal team rec.   - daily BMP monitoring, daily weight, daily strict I/O chart  - patient is hypotensive- Bumex transitioned to PO 2 mg once daily to avoid symptomatic hypotension  - s/p permanent HD cath. placement on 7/7/23  - Midodrine prn. tx. prior to HD if b/p is borderline low    # anemia of chronic disease - no gross bleeding events  - 7/7- s/p 1 unit PRBC, 7/10 hg 7.7    # Hyperkalemia- corrected     # Hypotension- d/c Cardizem, continue on Bumex, am serum Cortisol level- 12.3       # chronic respiratory failure, vent dependant via trach , trach care,  Vent management per Pulm. team   # Chronic dysphagia, sp peg, peg care   #Candiduria with U cx C tropicalis  # Afib w/ RVR - now rate controlled # Acute Decompensated CHF  CAD s/p stents  NSTEMI, medical management   # DKA, resolved  # Hx ICH   Bedbound from NH   - c/w keppra 500mg BID for seizure prophylaxis  # multiple sacral wounds. sacral and buttock , further wound care as per Wound care specialist report, frequent body position change, decub. prevention tx.     # hx of ICH, chronic non-verbal , quadriplegia - continue daily care.     # h/o DM 2- continue bsfs monitoring  with insulin sliding scale co.    DVT proph: Heparin subc. tx.     #Progress Note Handoff:  Patient remains with very poor overall prognosis , pending  candida auris swab cxs for placement, HD tx. as per renal team, monitor h/h   Family discussion: yes, medical team Disposition: SNF , once candida auris  swab results will be available     Total time spent to complete patient's bedside assessment, review medical chart, discuss medical plan of care with covering medical team was more than 35 minutes with >50% of time spent face to face with patient, discussion with patient/family and/or coordination of care.

## 2023-07-11 NOTE — PROGRESS NOTE ADULT - SUBJECTIVE AND OBJECTIVE BOX
Over Night Events: events noted, vent dependant, afebrile    PHYSICAL EXAM    ICU Vital Signs Last 24 Hrs  T(C): 36.2 (10 Jul 2023 20:25), Max: 37.3 (10 Jul 2023 13:30)  T(F): 97.2 (10 Jul 2023 20:25), Max: 99.1 (10 Jul 2023 13:30)  HR: 73 (11 Jul 2023 05:17) (73 - 84)  BP: 111/57 (11 Jul 2023 05:17) (111/57 - 126/67)  RR: 18 (10 Jul 2023 13:30) (18 - 18)  SpO2: 100% (11 Jul 2023 05:17) (100% - 100%)    O2 Parameters below as of 11 Jul 2023 05:17  Patient On (Oxygen Delivery Method): ventilator            General: ill looking  HEENT: trach             Lungs: Bilateral BS  Cardiovascular: Regular   Abdomen: Soft, Positive BS  unresponsive  skin ulcer      07-09-23 @ 07:01  -  07-10-23 @ 07:00  --------------------------------------------------------  IN:    Enteral Tube Flush: 120 mL    Peptamen A.F.: 750 mL  Total IN: 870 mL    OUT:  Total OUT: 0 mL    Total NET: 870 mL      07-10-23 @ 07:01  -  07-11-23 @ 05:39  --------------------------------------------------------  IN:    Enteral Tube Flush: 60 mL    Peptamen A.F.: 750 mL  Total IN: 810 mL    OUT:  Total OUT: 0 mL    Total NET: 810 mL          LABS:                          7.7    11.77 )-----------( 314      ( 10 Jul 2023 06:15 )             24.2                                               07-10    139  |  102  |  57<H>  ----------------------------<  124<H>  4.7   |  26  |  2.6<H>    Ca    9.1      10 Jul 2023 06:15    TPro  5.4<L>  /  Alb  2.3<L>  /  TBili  0.3  /  DBili  x   /  AST  16  /  ALT  13  /  AlkPhos  152<H>  07-10                                             Urinalysis Basic - ( 10 Jul 2023 06:15 )    Color: x / Appearance: x / SG: x / pH: x  Gluc: 124 mg/dL / Ketone: x  / Bili: x / Urobili: x   Blood: x / Protein: x / Nitrite: x   Leuk Esterase: x / RBC: x / WBC x   Sq Epi: x / Non Sq Epi: x / Bacteria: x                                                  LIVER FUNCTIONS - ( 10 Jul 2023 06:15 )  Alb: 2.3 g/dL / Pro: 5.4 g/dL / ALK PHOS: 152 U/L / ALT: 13 U/L / AST: 16 U/L / GGT: x                                                                                               Mode: AC/ CMV (Assist Control/ Continuous Mandatory Ventilation)  RR (machine): 14  TV (machine): 450  FiO2: 40  PEEP: 8  ITime: 0.8  MAP: 13  PIP: 25                                          MEDICATIONS  (STANDING):  albuterol/ipratropium for Nebulization 3 milliLiter(s) Nebulizer every 6 hours  aMIOdarone    Tablet 200 milliGRAM(s) Oral daily  aMIOdarone    Tablet   Oral   aspirin  chewable 81 milliGRAM(s) Oral daily  atorvastatin 80 milliGRAM(s) Oral at bedtime  buMETAnide 2 milliGRAM(s) Oral daily  chlorhexidine 0.12% Liquid 15 milliLiter(s) Oral Mucosa two times a day  chlorhexidine 2% Cloths 1 Application(s) Topical <User Schedule>  darbepoetin Injectable Syringe 25 MICROGram(s) IV Push <User Schedule>  dextrose 5%. 1000 milliLiter(s) (100 mL/Hr) IV Continuous <Continuous>  dextrose 5%. 1000 milliLiter(s) (50 mL/Hr) IV Continuous <Continuous>  dextrose 50% Injectable 25 Gram(s) IV Push once  dextrose 50% Injectable 25 Gram(s) IV Push once  dextrose 50% Injectable 12.5 Gram(s) IV Push once  glucagon  Injectable 1 milliGRAM(s) IntraMuscular once  heparin   Injectable 5000 Unit(s) SubCutaneous every 12 hours  insulin lispro (ADMELOG) corrective regimen sliding scale   SubCutaneous three times a day before meals  levETIRAcetam  Solution 500 milliGRAM(s) Enteral Tube two times a day  pantoprazole    Tablet 40 milliGRAM(s) Oral two times a day    MEDICATIONS  (PRN):  acetaminophen     Tablet .. 650 milliGRAM(s) Oral every 6 hours PRN Temp greater or equal to 38C (100.4F), Mild Pain (1 - 3)  dextrose Oral Gel 15 Gram(s) Oral once PRN Blood Glucose LESS THAN 70 milliGRAM(s)/deciliter  sodium chloride 0.9% Bolus. 100 milliLiter(s) IV Bolus every 5 minutes PRN SBP LESS THAN or EQUAL to 80 mmHg  sodium chloride 0.9% Bolus. 100 milliLiter(s) IV Bolus every 5 minutes PRN SBP LESS THAN or EQUAL to 90 mmHg  sodium chloride 0.9% Bolus. 100 milliLiter(s) IV Bolus every 5 minutes PRN SBP LESS THAN or EQUAL to 100 mmHg  sodium chloride 0.9% Bolus. 100 milliLiter(s) IV Bolus every 5 minutes PRN SBP LESS THAN or EQUAL to 100 mmHg  sodium chloride 0.9% Bolus. 100 milliLiter(s) IV Bolus every 5 minutes PRN SBP LESS THAN or EQUAL to 90 mmHg

## 2023-07-11 NOTE — PROGRESS NOTE ADULT - ASSESSMENT
IMPRESSION:    Sepsis present on admission resolved  Candida UTI sp tx  Pseudomonas PNA sp abx  anemia  left side atelectasis improved  Bilateral pleural effusions likely volume overload   MICHELLE on CKD III on dialysis   NSTEMI likely type 2  Hyponatremia resolved   Paroxysmal Afib  Acute on chronic anemia suspected UGI Bleed  Chronic respiratory failure  H/o ICH s/p trach and PEG  H/o CAD      PLAN:    CNS: Avoid CNS depressants     HEENT: Oral and trach care    PULMONARY: HOB 45. Aspiration.  No vent changes.  Goal saturation 92-96%. Vent dependent.  Pulmonary toilet. not weanable    CARDIOVASCULAR: Avoid overload.      GI:  Feeding  Bowel regimen     RENAL: trend CMP.  Correct as needed.  Nephrology following. HD per renal     INFECTIOUS DISEASE: ABX per ID.      HEMATOLOGICAL: DVT prophylaxis.     ENDOCRINE: Follow up FS. Insulin protocol if needed.    DNR    Poor prognosis   dc planning

## 2023-07-11 NOTE — PROGRESS NOTE ADULT - SUBJECTIVE AND OBJECTIVE BOX
seen and examined  24 h events noted   trach/vent       PAST HISTORY  --------------------------------------------------------------------------------  No significant changes to PMH, PSH, FHx, SHx, unless otherwise noted    ALLERGIES & MEDICATIONS  --------------------------------------------------------------------------------  Allergies    penicillin (Other)    Intolerances      Standing Inpatient Medications  albuterol/ipratropium for Nebulization 3 milliLiter(s) Nebulizer every 6 hours  aMIOdarone    Tablet 200 milliGRAM(s) Oral daily  aMIOdarone    Tablet   Oral   aspirin  chewable 81 milliGRAM(s) Oral daily  atorvastatin 80 milliGRAM(s) Oral at bedtime  buMETAnide 2 milliGRAM(s) Oral daily  chlorhexidine 0.12% Liquid 15 milliLiter(s) Oral Mucosa two times a day  chlorhexidine 2% Cloths 1 Application(s) Topical <User Schedule>  darbepoetin Injectable Syringe 25 MICROGram(s) IV Push <User Schedule>  dextrose 5%. 1000 milliLiter(s) IV Continuous <Continuous>  dextrose 5%. 1000 milliLiter(s) IV Continuous <Continuous>  dextrose 50% Injectable 12.5 Gram(s) IV Push once  dextrose 50% Injectable 25 Gram(s) IV Push once  dextrose 50% Injectable 25 Gram(s) IV Push once  glucagon  Injectable 1 milliGRAM(s) IntraMuscular once  heparin   Injectable 5000 Unit(s) SubCutaneous every 12 hours  insulin lispro (ADMELOG) corrective regimen sliding scale   SubCutaneous three times a day before meals  levETIRAcetam  Solution 500 milliGRAM(s) Enteral Tube two times a day  pantoprazole    Tablet 40 milliGRAM(s) Oral two times a day    PRN Inpatient Medications  acetaminophen     Tablet .. 650 milliGRAM(s) Oral every 6 hours PRN  dextrose Oral Gel 15 Gram(s) Oral once PRN  sodium chloride 0.9% Bolus. 100 milliLiter(s) IV Bolus every 5 minutes PRN  sodium chloride 0.9% Bolus. 100 milliLiter(s) IV Bolus every 5 minutes PRN  sodium chloride 0.9% Bolus. 100 milliLiter(s) IV Bolus every 5 minutes PRN  sodium chloride 0.9% Bolus. 100 milliLiter(s) IV Bolus every 5 minutes PRN          VITALS/PHYSICAL EXAM  --------------------------------------------------------------------------------  T(C): 36.2 (07-10-23 @ 20:25), Max: 37.3 (07-10-23 @ 13:30)  HR: 73 (07-11-23 @ 03:36) (73 - 84)  BP: 122/70 (07-10-23 @ 20:25) (122/70 - 126/67)  RR: 18 (07-10-23 @ 13:30) (18 - 18)  SpO2: 100% (07-11-23 @ 03:36) (100% - 100%)  Wt(kg): --        07-09-23 @ 07:01  -  07-10-23 @ 07:00  --------------------------------------------------------  IN: 870 mL / OUT: 0 mL / NET: 870 mL    07-10-23 @ 07:01  -  07-11-23 @ 05:03  --------------------------------------------------------  IN: 810 mL / OUT: 0 mL / NET: 810 mL      Physical Exam:  	Gen: trach/vent  	Pulm:  B/L aline   	CV: S1S2; no rub  	Abd: +distended  	LE:  edema  	Vascular access:tdc     LABS/STUDIES  --------------------------------------------------------------------------------              7.7    11.77 >-----------<  314      [07-10-23 @ 06:15]              24.2     139  |  102  |  57  ----------------------------<  124      [07-10-23 @ 06:15]  4.7   |  26  |  2.6        Ca     9.1     [07-10-23 @ 06:15]    TPro  5.4  /  Alb  2.3  /  TBili  0.3  /  DBili  x   /  AST  16  /  ALT  13  /  AlkPhos  152  [07-10-23 @ 06:15]    Creatinine Trend:  SCr 2.6 [07-10 @ 06:15]  SCr 2.3 [07-08 @ 10:15]  SCr 2.0 [07-07 @ 10:40]  SCr 2.0 [07-07 @ 07:07]  SCr 1.7 [07-06 @ 17:44]    Urinalysis - [07-10-23 @ 06:15]      Color  / Appearance  / SG  / pH       Gluc 124 / Ketone   / Bili  / Urobili        Blood  / Protein  / Leuk Est  / Nitrite       RBC  / WBC  / Hyaline  / Gran  / Sq Epi  / Non Sq Epi  / Bacteria       Iron 51, TIBC 111, %sat 46      [06-06-23 @ 10:40]  Ferritin 1956      [06-06-23 @ 10:40]  Vitamin D (25OH) 70      [06-03-23 @ 06:50]

## 2023-07-11 NOTE — PROGRESS NOTE ADULT - ASSESSMENT
62 yo M with  PMH of ICH (2021) s/p trach and PEG, HTN, HLD, T2DM, CAD s/p stents, Recurrent C diff, BIBEMS from [facility] for abnormal labs. Patient non-verbal at baseline - limited history. Per NH chart  have a BUN greater than 200 and creatinine of 4.3 on outpatient labs. Outpatient CXR also w/ new L sided pleural effusion. In ED, patient was hypotensive with Afib with RVR, found to be in acure renal failure, anemic with elevated trops. He was started on Amiodarone GTT, PPI GTT, gastric lavage with coffee ground secretions, started on broad spectrum abx and admitted to MICU  While in MICU, course complicated by DKA, s/p insulin drip, MICHELLE started on HD, acute anemia s/p PRBC transfusion, NSTEMI, downgraded to SDU -> floor. Pending TDC placement for discharge.    # Acute Renal Failure, started on HD 6/1  # Mild Hyperkalemia  # Fluid Overload- Improving- c/w Bumex and HD  # HAGMA- resolved  - HD port created 7/7, received HD 7/7, 7/11  - Nephro on board- likely HD twice a week. Daily BMP, weight, strict I/O  - midodrine 1 hr before HD if SBP <100  - s/p Midodrine - BP better, s/p Sodium Bicarb- acidosis improved  - TTE 6/1 - EF 63%, GIDD   - K+ WNL, off Lokelma   - monitor BMP    # anemia of chronic disease  - iron WNL  - monitor H/H, keep Hgb > 7  - received multiple blood transfusions  - last transfusion 7/7    # chronic respiratory failure,   vent dependant via trach   trach care    # functional dysphagia,   sp peg   peg care     # Candiduria with U cx C tropicalis  # MDRO UTI and PNA-  s/p Fluconazole  s/p Ceftolozane-tazobactam    # Hyponatremia-resolved    # Afib w/ RVR -   # Acute Decompensated CHF  # CAD s/p stents  # NSTEMI type 2  -now rate controlled- c/w Amio and Cardizem  -CHF resolved s/p diuresis  -Seen by cardio 5/31, recommend c/w asa and stating, medical management for NSTEMI    # DKA, resolved  -s/p insulin drip, SSI and tube feeds  Monitor FS     # Diarrhea   GI PCR -ve, C.diff -not detected    cont dignishield     # Hx ICH  # multiple sacral wounds  # functional paraplegia ,   Bedbound from NH  has upper ext contracture total care   c/w keppra 500mg BID for seizure prophylaxis  sacral and buttock wounds , un-stageable  Care instructions per wound care:   Clean sacrum , B/L buttock and upper back wound with vashe wound cleanser, pat dry then apply hydrogel with moist vashe guaze dressing  Q 12 hours   Continue skin barrier  to R heel - monitor progression  Pressure  injury  preventive  measures  Skin  and incontinence care     #Hypotension  Cardizem was discontinued and holding parameters placed on Bumex  AM serum cortisol WNL    #Misc  -DVT prophylaxis: Heparin SQ  -GI prophylaxis: Protonix IV BID  -Diet: Tube feed  -Code status: DNR  -Activity: IAT  -Dispo: pending    #Progress Note Handoff:  Patient remains with very poor overall prognosis , pending  candida auris swab cxs for placement, HD tx. as per renal team, monitor h/h   Family discussion: yes, medical team Disposition: SNF , once candida auris  swab results will be available

## 2023-07-11 NOTE — PROGRESS NOTE ADULT - SUBJECTIVE AND OBJECTIVE BOX
ALICIA BARBOUR 63y Male  MRN#: [id number]     Hospital Day: 42d    Pt is currently admitted with the primary diagnosis of     Overnight events   -No major overnight events                                          ----------------------------------------------------------  OBJECTIVE  PAST MEDICAL & SURGICAL HISTORY  HTN (hypertension)    Diabetes mellitus    H/O intracranial hemorrhage    H/O tracheostomy    PEG (percutaneous endoscopic gastrostomy) status    Hyperlipidemia                                              -----------------------------------------------------------  MEDICATIONS:  STANDING MEDICATIONS  albuterol/ipratropium for Nebulization 3 milliLiter(s) Nebulizer every 6 hours  aMIOdarone    Tablet 200 milliGRAM(s) Oral daily  aMIOdarone    Tablet   Oral   aspirin  chewable 81 milliGRAM(s) Oral daily  atorvastatin 80 milliGRAM(s) Oral at bedtime  buMETAnide 2 milliGRAM(s) Oral daily  chlorhexidine 0.12% Liquid 15 milliLiter(s) Oral Mucosa two times a day  chlorhexidine 2% Cloths 1 Application(s) Topical <User Schedule>  darbepoetin Injectable Syringe 25 MICROGram(s) IV Push <User Schedule>  dextrose 5%. 1000 milliLiter(s) IV Continuous <Continuous>  dextrose 5%. 1000 milliLiter(s) IV Continuous <Continuous>  dextrose 50% Injectable 12.5 Gram(s) IV Push once  dextrose 50% Injectable 25 Gram(s) IV Push once  dextrose 50% Injectable 25 Gram(s) IV Push once  glucagon  Injectable 1 milliGRAM(s) IntraMuscular once  heparin   Injectable 5000 Unit(s) SubCutaneous every 12 hours  insulin lispro (ADMELOG) corrective regimen sliding scale   SubCutaneous three times a day before meals  levETIRAcetam  Solution 500 milliGRAM(s) Enteral Tube two times a day  pantoprazole    Tablet 40 milliGRAM(s) Oral two times a day    PRN MEDICATIONS  acetaminophen     Tablet .. 650 milliGRAM(s) Oral every 6 hours PRN  dextrose Oral Gel 15 Gram(s) Oral once PRN  sodium chloride 0.9% Bolus. 100 milliLiter(s) IV Bolus every 5 minutes PRN  sodium chloride 0.9% Bolus. 100 milliLiter(s) IV Bolus every 5 minutes PRN                                            ------------------------------------------------------------  VITAL SIGNS: Last 24 Hours  T(C): 36.4 (11 Jul 2023 05:17), Max: 36.4 (11 Jul 2023 05:17)  T(F): 97.6 (11 Jul 2023 05:17), Max: 97.6 (11 Jul 2023 05:17)  HR: 75 (11 Jul 2023 11:33) (73 - 85)  BP: 149/69 (11 Jul 2023 11:33) (111/57 - 194/77)  BP(mean): --  RR: 16 (11 Jul 2023 11:33) (16 - 19)  SpO2: 100% (11 Jul 2023 11:33) (100% - 100%)      07-10-23 @ 07:01  -  07-11-23 @ 07:00  --------------------------------------------------------  IN: 810 mL / OUT: 0 mL / NET: 810 mL    07-11-23 @ 07:01  -  07-11-23 @ 15:13  --------------------------------------------------------  IN: 435 mL / OUT: 1000 mL / NET: -565 mL                                             --------------------------------------------------------------  LABS:                        7.7    11.77 )-----------( 314      ( 10 Jul 2023 06:15 )             24.2     07-10    139  |  102  |  57<H>  ----------------------------<  124<H>  4.7   |  26  |  2.6<H>    Ca    9.1      10 Jul 2023 06:15    TPro  5.4<L>  /  Alb  2.3<L>  /  TBili  0.3  /  DBili  x   /  AST  16  /  ALT  13  /  AlkPhos  152<H>  07-10      Urinalysis Basic - ( 10 Jul 2023 06:15 )    Color: x / Appearance: x / SG: x / pH: x  Gluc: 124 mg/dL / Ketone: x  / Bili: x / Urobili: x   Blood: x / Protein: x / Nitrite: x   Leuk Esterase: x / RBC: x / WBC x   Sq Epi: x / Non Sq Epi: x / Bacteria: x                                                    --------------------------------------------------------------  PHYSICAL EXAM:  deferred while pt at dialysis     PLAN:    # DVT prophylaxis     # GI prophylaxis     # Diet     # Activity Score (AM-PAC)    #Progress Note Handoff  Pending (specify):  Family discussion:   Disposition:

## 2023-07-12 NOTE — PROGRESS NOTE ADULT - ATTENDING COMMENTS
62 y/o Male  with  PMH of ICH (2021) s/p trach and PEG, HTN, HLD, T2DM, CAD s/p stents, Recurrent C diff, BIBEMS from [facility] for abnormal labs. Patient non-verbal at baseline . Per NH chart  have a BUN greater than 200 and creatinine of 4.3 on outpatient labs. Outpatient CXR also w/ new L sided pleural effusion. In ED, patient was hypotensive with Afib with RVR, found to be in acute renal failure, anemic with elevated trops. He was started on Amiodarone GTT, PPI GTT, gastric lavage with coffee ground secretions, started on broad spectrum abx and admitted to MICU  While in MICU, course complicated by DKA, s/p insulin drip, MICHELLE started on HD, acute anemia s/p PRBC transfusion, NSTEMI, downgraded to SDU -> floor than downgraded to Vent Unit.    # Acute Renal Failure, started on HD 6/1, as per Renal team rec.   - daily BMP monitoring, daily weight, daily strict I/O chart  - patient is hypotensive- Bumex transitioned to PO 2 mg once daily to avoid symptomatic hypotension  - s/p permanent HD cath. placement on 7/7/23  - Midodrine prn. tx. prior to HD if b/p is borderline low    # anemia of chronic disease - no gross bleeding events  - 7/7- s/p 1 unit PRBC, 7/10 hg 7.7    # Hyperkalemia- corrected     # Hypotension- d/c Cardizem, continue on Bumex, am serum Cortisol level- 12.3       # chronic respiratory failure, vent dependant via trach , trach care,  Vent management per Pulm. team   # Chronic dysphagia, sp peg, peg care   #Candiduria with U cx C tropicalis  # Afib w/ RVR - now rate controlled # Acute Decompensated CHF  CAD s/p stents  NSTEMI, medical management   # DKA, resolved  # Hx ICH   Bedbound from NH   - c/w keppra 500mg BID for seizure prophylaxis  # multiple sacral wounds. sacral and buttock , further wound care as per Wound care specialist report, frequent body position change, decub. prevention tx.     # hx of ICH, chronic non-verbal , quadriplegia - continue daily care.     # h/o DM 2- continue bsfs monitoring  with insulin sliding scale co.    DVT proph: Heparin subc. tx.     #Progress Note Handoff:  Patient remains with very poor overall prognosis , pending  candida auris swab cxs for placement, HD tx. as per renal team, monitor h/h   Family discussion: yes, medical team Disposition: SNF , once candida auris  swab results will be available

## 2023-07-12 NOTE — PROGRESS NOTE ADULT - ASSESSMENT
64 yo M with  PMH of ICH (2021) s/p trach and PEG, HTN, HLD, T2DM, CAD s/p stents, Recurrent C diff, BIBEMS from [facility] for abnormal labs. Patient non-verbal at baseline - limited history. Per NH chart  have a BUN greater than 200 and creatinine of 4.3 on outpatient labs. Outpatient CXR also w/ new L sided pleural effusion. In ED, patient was hypotensive with Afib with RVR, found to be in acure renal failure, anemic with elevated trops. He was started on Amiodarone GTT, PPI GTT, gastric lavage with coffee ground secretions, started on broad spectrum abx and admitted to MICU  While in MICU, course complicated by DKA, s/p insulin drip, MICHELLE started on HD, acute anemia s/p PRBC transfusion, NSTEMI, downgraded to SDU -> floor. Pending TDC placement for discharge.    # Acute Renal Failure, started on HD 6/1  # Mild Hyperkalemia  # Fluid Overload- Improving- c/w Bumex and HD  # HAGMA- resolved  - HD port created 7/7, received HD 7/7, 7/11  - Nephro on board- likely HD twice a week. Daily BMP, weight, strict I/O  - midodrine 1 hr before HD if SBP <100  - s/p Midodrine - BP better, s/p Sodium Bicarb- acidosis improved  - TTE 6/1 - EF 63%, GIDD   - K+ elevted, restarted Lokelma   - monitor BMP    # Afib w/ RVR -   # Acute Decompensated CHF  # CAD s/p stents  # NSTEMI type 2  -now rate controlled- c/w Amio and Cardizem  -CHF resolved s/p diuresis  -Seen by cardio 5/31, recommend c/w asa and stating, medical management for NSTEMI    # anemia of chronic disease  - iron WNL  - monitor H/H, keep Hgb > 7  - received multiple blood transfusions  - last transfusion 7/7    # chronic respiratory failure,   vent dependant via trach   trach care    # functional dysphagia,   sp peg   peg care     # Candiduria with U cx C tropicalis  # MDRO UTI and PNA-  s/p Fluconazole  s/p Ceftolozane-tazobactam    # Hyponatremia-resolved    # DKA, resolved  -s/p insulin drip, SSI and tube feeds  Monitor FS     # Diarrhea   GI PCR -ve, C.diff -not detected    cont dignishield     # Hx ICH  # multiple sacral wounds  # functional paraplegia ,   Bedbound from NH  has upper ext contracture total care   c/w keppra 500mg BID for seizure prophylaxis  sacral and buttock wounds , un-stageable  Care instructions per wound care:   Clean sacrum , B/L buttock and upper back wound with vashe wound cleanser, pat dry then apply hydrogel with moist vashe guaze dressing  Q 12 hours   Continue skin barrier  to R heel - monitor progression  Pressure  injury  preventive  measures  Skin  and incontinence care     #Hypotension  Cardizem was discontinued and holding parameters placed on Bumex  AM serum cortisol WNL    #Misc  -DVT prophylaxis: Heparin SQ  -GI prophylaxis: Protonix IV BID  -Diet: Tube feed  -Code status: DNR  -Activity: IAT  -Dispo: pending    #Progress Note Handoff:  Patient remains with very poor overall prognosis , pending  candida auris swab cxs for placement, HD tx. as per renal team, monitor h/h   Family discussion: yes, medical team Disposition: SNF , once candida auris  swab results will be available

## 2023-07-12 NOTE — PROGRESS NOTE ADULT - SUBJECTIVE AND OBJECTIVE BOX
Over Night Events: events noted, vent dependant, afebrile, renal noted    PHYSICAL EXAM    ICU Vital Signs Last 24 Hrs  T(C): 36.7 (12 Jul 2023 05:00), Max: 36.8 (11 Jul 2023 20:16)  T(F): 98.1 (12 Jul 2023 05:00), Max: 98.3 (11 Jul 2023 20:16)  HR: 80 (12 Jul 2023 05:00) (75 - 88)  BP: 110/58 (12 Jul 2023 05:00) (110/58 - 194/77)  RR: 18 (12 Jul 2023 05:00) (16 - 19)  SpO2: 100% (12 Jul 2023 05:00) (100% - 100%)    O2 Parameters below as of 11 Jul 2023 20:16  Patient On (Oxygen Delivery Method): ventilator            General: ill looking  HEENT: trach  Lungs: Bilateral rhonchi  Cardiovascular: Regular   Abdomen: Soft, Positive BS  unresponsive      07-10-23 @ 07:01  -  07-11-23 @ 07:00  --------------------------------------------------------  IN:    Enteral Tube Flush: 60 mL    Peptamen A.F.: 750 mL  Total IN: 810 mL    OUT:  Total OUT: 0 mL    Total NET: 810 mL      07-11-23 @ 07:01  -  07-12-23 @ 05:48  --------------------------------------------------------  IN:    Enteral Tube Flush: 150 mL    Peptamen A.F.: 1125 mL  Total IN: 1275 mL    OUT:    Other (mL): 1000 mL  Total OUT: 1000 mL    Total NET: 275 mL          LABS:                          7.7    9.64  )-----------( 338      ( 11 Jul 2023 16:58 )             24.9                                               07-11    137  |  100  |  38<H>  ----------------------------<  140<H>  5.5<H>   |  28  |  1.8<H>    Ca    8.5      11 Jul 2023 16:58    TPro  5.4<L>  /  Alb  2.3<L>  /  TBili  0.3  /  DBili  x   /  AST  16  /  ALT  13  /  AlkPhos  152<H>  07-10                                             Urinalysis Basic - ( 11 Jul 2023 16:58 )    Color: x / Appearance: x / SG: x / pH: x  Gluc: 140 mg/dL / Ketone: x  / Bili: x / Urobili: x   Blood: x / Protein: x / Nitrite: x   Leuk Esterase: x / RBC: x / WBC x   Sq Epi: x / Non Sq Epi: x / Bacteria: x                                                  LIVER FUNCTIONS - ( 10 Jul 2023 06:15 )  Alb: 2.3 g/dL / Pro: 5.4 g/dL / ALK PHOS: 152 U/L / ALT: 13 U/L / AST: 16 U/L / GGT: x                                                                                               Mode: AC/ CMV (Assist Control/ Continuous Mandatory Ventilation)  RR (machine): 14  TV (machine): 450  FiO2: 40  PEEP: 8  ITime: 0.8  MAP: 10  PIP: 22                                          MEDICATIONS  (STANDING):  albuterol/ipratropium for Nebulization 3 milliLiter(s) Nebulizer every 6 hours  aMIOdarone    Tablet   Oral   aMIOdarone    Tablet 200 milliGRAM(s) Oral daily  aspirin  chewable 81 milliGRAM(s) Oral daily  atorvastatin 80 milliGRAM(s) Oral at bedtime  buMETAnide 2 milliGRAM(s) Oral daily  chlorhexidine 0.12% Liquid 15 milliLiter(s) Oral Mucosa two times a day  chlorhexidine 2% Cloths 1 Application(s) Topical <User Schedule>  darbepoetin Injectable Syringe 25 MICROGram(s) IV Push <User Schedule>  dextrose 5%. 1000 milliLiter(s) (50 mL/Hr) IV Continuous <Continuous>  dextrose 5%. 1000 milliLiter(s) (100 mL/Hr) IV Continuous <Continuous>  dextrose 50% Injectable 25 Gram(s) IV Push once  dextrose 50% Injectable 25 Gram(s) IV Push once  dextrose 50% Injectable 12.5 Gram(s) IV Push once  glucagon  Injectable 1 milliGRAM(s) IntraMuscular once  heparin   Injectable 5000 Unit(s) SubCutaneous every 12 hours  insulin lispro (ADMELOG) corrective regimen sliding scale   SubCutaneous three times a day before meals  levETIRAcetam  Solution 500 milliGRAM(s) Enteral Tube two times a day  pantoprazole    Tablet 40 milliGRAM(s) Oral two times a day  polyethylene glycol 3350 17 Gram(s) Oral daily  sodium zirconium cyclosilicate 5 Gram(s) Oral daily    MEDICATIONS  (PRN):  acetaminophen     Tablet .. 650 milliGRAM(s) Oral every 6 hours PRN Temp greater or equal to 38C (100.4F), Mild Pain (1 - 3)  dextrose Oral Gel 15 Gram(s) Oral once PRN Blood Glucose LESS THAN 70 milliGRAM(s)/deciliter  sodium chloride 0.9% Bolus. 100 milliLiter(s) IV Bolus every 5 minutes PRN SBP LESS THAN or EQUAL to 80 mmHg  sodium chloride 0.9% Bolus. 100 milliLiter(s) IV Bolus every 5 minutes PRN SBP LESS THAN or EQUAL to 90 mmHg

## 2023-07-12 NOTE — PROGRESS NOTE ADULT - ASSESSMENT
IMPRESSION:    Sepsis present on admission resolved  Candida UTI sp tx  Pseudomonas PNA sp abx  anemia  left side atelectasis improved  Bilateral pleural effusions likely volume overload   MICHELLE on CKD III on dialysis   NSTEMI likely type 2  Hyponatremia resolved   Paroxysmal Afib  Acute on chronic anemia suspected UGI Bleed  Chronic respiratory failure  H/o ICH s/p trach and PEG  H/o CAD      PLAN:    CNS: Avoid CNS depressants     HEENT: Oral and trach care    PULMONARY: HOB 45. Aspiration.  No vent changes.  Goal saturation 92-96%. Vent dependent.  Pulmonary toilet.    CARDIOVASCULAR: Avoid overload.      GI:  Feeding  Bowel regimen     RENAL: trend CMP.  Correct as needed.  Nephrology following. HD per renal     INFECTIOUS DISEASE: ABX per ID.      HEMATOLOGICAL: DVT prophylaxis.     ENDOCRINE: Follow up FS. Insulin protocol if needed.    DNR    Poor prognosis   dc planning  palliative care fup

## 2023-07-12 NOTE — PROGRESS NOTE ADULT - SUBJECTIVE AND OBJECTIVE BOX
NUTRITION SUPPORT TEAM  -  PROGRESS NOTE   vented via trach  on peg tube feed    tolerating enteral feeds,   nephrology f/u noted  hypophosphatemia  REVIEW OF SYSTEMS:  Negative except as noted above.     VITALS:  T(F): 98.1 (07-12 @ 05:00), Max: 98.1 (07-12 @ 05:00)  HR: 81 (07-12 @ 09:00) (80 - 81)  BP: 110/58 (07-12 @ 05:00) (110/58 - 110/58)  RR: 18 (07-12 @ 05:00) (18 - 18)  SpO2: 100% (07-12 @ 09:00) (100% - 100%)  Mode: AC/ CMV (Assist Control/ Continuous Mandatory Ventilation)  RR (machine): 14  TV (machine): 450  FiO2: 40  PEEP: 8  ITime: 0.8  MAP: 11  PIP: 22    HEIGHT/WEIGHT/BMI:   Height (cm): 170.2 (06-01), 180.3 (10-04)  Weight (kg): 93.3 (06-05), 66.4 (10-04)  BMI (kg/m2): 32.2 (06-05), 22.9 (06-01), 20.4 (10-04)    07-11-23 @ 07:01  -  07-12-23 @ 07:00  --------------------------------------------------------  IN:    Enteral Tube Flush: 210 mL    Peptamen A.F.: 1500 mL  Total IN: 1710 mL    OUT:    Other (mL): 1000 mL  Total OUT: 1000 mL  Total NET: 710 mL    MEDICATIONS:   acetaminophen     Tablet .. 650 milliGRAM(s) Oral every 6 hours PRN  albuterol/ipratropium for Nebulization 3 milliLiter(s) Nebulizer every 6 hours  aMIOdarone    Tablet 200 milliGRAM(s) Oral daily  aMIOdarone    Tablet   Oral   aspirin  chewable 81 milliGRAM(s) Oral daily  atorvastatin 80 milliGRAM(s) Oral at bedtime  buMETAnide 2 milliGRAM(s) Oral daily  chlorhexidine 0.12% Liquid 15 milliLiter(s) Oral Mucosa two times a day  chlorhexidine 2% Cloths 1 Application(s) Topical <User Schedule>  darbepoetin Injectable Syringe 25 MICROGram(s) IV Push <User Schedule>  glucagon  Injectable 1 milliGRAM(s) IntraMuscular once  heparin   Injectable 5000 Unit(s) SubCutaneous every 12 hours  insulin lispro (ADMELOG) corrective regimen sliding scale   SubCutaneous three times a day before meals  levETIRAcetam  Solution 500 milliGRAM(s) Enteral Tube two times a day  pantoprazole    Tablet 40 milliGRAM(s) Oral two times a day  polyethylene glycol 3350 17 Gram(s) Oral daily  sodium chloride 0.9% Bolus. 100 milliLiter(s) IV Bolus every 5 minutes PRN  sodium chloride 0.9% Bolus. 100 milliLiter(s) IV Bolus every 5 minutes PRN  sodium zirconium cyclosilicate 5 Gram(s) Oral daily    LABS:                         7.6    10.57 )-----------( 309      ( 12 Jul 2023 08:59 )             24.1     140  |  101  |  45<H>  ----------------------------<  162<H>          (07-12-23 @ 08:59)  4.7   |  29  |  2.1<H>    Ca    9.0          (07-12-23 @ 08:59)  Phos  1.4         (07-12-23 @ 08:59)  Mg     2.0         (07-12-23 @ 08:59)  Vitamin D, 25-Hydroxy: 70 ng/mL (06-03 @ 06:50)  Folate: >20.0 ng/mL (06-03 @ 06:50)  Vitamin B12, Serum: 1908 pg/mL (06-06 @ 10:40)  Zinc Level, Plasma: 54 ug/dL (06-03 @ 06:50)  Blood Glucose (Past 24 hours):  133 mg/dL (07-12 @ 12:08)  133 mg/dL (07-12 @ 06:10)  127 mg/dL (07-12 @ 00:14)    DIET:   Diet, NPO with Tube Feed:   Tube Feeding Modality: Gastrostomy  Peptamen A.F. Formula  Total Volume for 24 Hours (mL): 1500  Bolus  Total Volume of Bolus (mL):  375  Tube Feed Frequency: Every 6 hours   Tube Feed Start Time: 16:00  Bolus Feed Rate (mL per Hour): 500   Bolus Feed Duration (in Hours): 0.75  Free Water Flush Instructions:  flush GT with 30 ml water syringe push before & after each feed (06-02-23 @ 15:58) [Active]     NUTRITION SUPPORT TEAM  -  PROGRESS NOTE   vented via trach  arms contracted, awake, not interactive  + UE edema  on G tube feed    tolerating enteral feeds,   nephrology f/u noted - on HD since 6/1  hypophosphatemia    REVIEW OF SYSTEMS:  Negative except as noted above.     VITALS:  T(F): 98.1 (07-12 @ 05:00), Max: 98.1 (07-12 @ 05:00)  HR: 81 (07-12 @ 09:00) (80 - 81)  BP: 110/58 (07-12 @ 05:00) (110/58 - 110/58)  RR: 18 (07-12 @ 05:00) (18 - 18)  SpO2: 100% (07-12 @ 09:00) (100% - 100%)  Mode: AC/ CMV (Assist Control/ Continuous Mandatory Ventilation)  RR (machine): 14  TV (machine): 450  FiO2: 40  PEEP: 8  ITime: 0.8  MAP: 11  PIP: 22    HEIGHT/WEIGHT/BMI:   Height (cm): 170.2 (06-01), 180.3 (10-04)  Weight (kg): 93.3 (06-05), 66.4 (10-04)  BMI (kg/m2): 32.2 (06-05), 22.9 (06-01), 20.4 (10-04)    07-11-23 @ 07:01  -  07-12-23 @ 07:00  --------------------------------------------------------  IN:    Enteral Tube Flush: 210 mL    Peptamen A.F.: 1500 mL  Total IN: 1710 mL    OUT:    Other (mL): 1000 mL  Total OUT: 1000 mL  Total NET: 710 mL    MEDICATIONS:   acetaminophen     Tablet .. 650 milliGRAM(s) Oral every 6 hours PRN  albuterol/ipratropium for Nebulization 3 milliLiter(s) Nebulizer every 6 hours  aMIOdarone    Tablet 200 milliGRAM(s) Oral daily  aMIOdarone    Tablet   Oral   aspirin  chewable 81 milliGRAM(s) Oral daily  atorvastatin 80 milliGRAM(s) Oral at bedtime  buMETAnide 2 milliGRAM(s) Oral daily  chlorhexidine 0.12% Liquid 15 milliLiter(s) Oral Mucosa two times a day  chlorhexidine 2% Cloths 1 Application(s) Topical <User Schedule>  darbepoetin Injectable Syringe 25 MICROGram(s) IV Push <User Schedule>  glucagon  Injectable 1 milliGRAM(s) IntraMuscular once  heparin   Injectable 5000 Unit(s) SubCutaneous every 12 hours  insulin lispro (ADMELOG) corrective regimen sliding scale   SubCutaneous three times a day before meals  levETIRAcetam  Solution 500 milliGRAM(s) Enteral Tube two times a day  pantoprazole    Tablet 40 milliGRAM(s) Oral two times a day  polyethylene glycol 3350 17 Gram(s) Oral daily  sodium chloride 0.9% Bolus. 100 milliLiter(s) IV Bolus every 5 minutes PRN  sodium chloride 0.9% Bolus. 100 milliLiter(s) IV Bolus every 5 minutes PRN  sodium zirconium cyclosilicate 5 Gram(s) Oral daily    LABS:                         7.6    10.57 )-----------( 309      ( 12 Jul 2023 08:59 )             24.1     140  |  101  |  45<H>  ----------------------------<  162<H>          (07-12-23 @ 08:59)  4.7   |  29  |  2.1<H>    Ca    9.0          (07-12-23 @ 08:59)  Phos  1.4         (07-12-23 @ 08:59)  Mg     2.0         (07-12-23 @ 08:59)  Vitamin D, 25-Hydroxy: 70 ng/mL (06-03 @ 06:50)  Folate: >20.0 ng/mL (06-03 @ 06:50)  Vitamin B12, Serum: 1908 pg/mL (06-06 @ 10:40)  Zinc Level, Plasma: 54 ug/dL (06-03 @ 06:50)  Blood Glucose (Past 24 hours):  133 mg/dL (07-12 @ 12:08)  133 mg/dL (07-12 @ 06:10)  127 mg/dL (07-12 @ 00:14)    DIET:   Diet, NPO with Tube Feed:   Tube Feeding Modality: Gastrostomy  Peptamen A.F. Formula  Total Volume for 24 Hours (mL): 1500  Bolus  Total Volume of Bolus (mL):  375  Tube Feed Frequency: Every 6 hours   Tube Feed Start Time: 16:00  Bolus Feed Rate (mL per Hour): 500   Bolus Feed Duration (in Hours): 0.75  Free Water Flush Instructions:  flush GT with 30 ml water syringe push before & after each feed (06-02-23 @ 15:58) [Active]

## 2023-07-12 NOTE — PROGRESS NOTE ADULT - SUBJECTIVE AND OBJECTIVE BOX
ALICIA BARBOUR 63y Male  MRN#: [id number]     Hospital Day: 43d    Pt is currently admitted with the primary diagnosis of     Overnight events   -overnight potasium kade to 5.5, EKG without acute changes. Lokalma restarted.   - Miralax given for BM                                          ----------------------------------------------------------  OBJECTIVE  PAST MEDICAL & SURGICAL HISTORY  HTN (hypertension)    Diabetes mellitus    H/O intracranial hemorrhage    H/O tracheostomy    PEG (percutaneous endoscopic gastrostomy) status    Hyperlipidemia                                              -----------------------------------------------------------  MEDICATIONS:  STANDING MEDICATIONS  albuterol/ipratropium for Nebulization 3 milliLiter(s) Nebulizer every 6 hours  aMIOdarone    Tablet 200 milliGRAM(s) Oral daily  aMIOdarone    Tablet   Oral   aspirin  chewable 81 milliGRAM(s) Oral daily  atorvastatin 80 milliGRAM(s) Oral at bedtime  buMETAnide 2 milliGRAM(s) Oral daily  chlorhexidine 0.12% Liquid 15 milliLiter(s) Oral Mucosa two times a day  chlorhexidine 2% Cloths 1 Application(s) Topical <User Schedule>  darbepoetin Injectable Syringe 25 MICROGram(s) IV Push <User Schedule>  dextrose 5%. 1000 milliLiter(s) IV Continuous <Continuous>  dextrose 5%. 1000 milliLiter(s) IV Continuous <Continuous>  dextrose 50% Injectable 25 Gram(s) IV Push once  dextrose 50% Injectable 25 Gram(s) IV Push once  dextrose 50% Injectable 12.5 Gram(s) IV Push once  glucagon  Injectable 1 milliGRAM(s) IntraMuscular once  heparin   Injectable 5000 Unit(s) SubCutaneous every 12 hours  insulin lispro (ADMELOG) corrective regimen sliding scale   SubCutaneous three times a day before meals  levETIRAcetam  Solution 500 milliGRAM(s) Enteral Tube two times a day  pantoprazole    Tablet 40 milliGRAM(s) Oral two times a day  polyethylene glycol 3350 17 Gram(s) Oral daily  sodium zirconium cyclosilicate 5 Gram(s) Oral daily    PRN MEDICATIONS  acetaminophen     Tablet .. 650 milliGRAM(s) Oral every 6 hours PRN  dextrose Oral Gel 15 Gram(s) Oral once PRN  sodium chloride 0.9% Bolus. 100 milliLiter(s) IV Bolus every 5 minutes PRN  sodium chloride 0.9% Bolus. 100 milliLiter(s) IV Bolus every 5 minutes PRN                                            ------------------------------------------------------------  VITAL SIGNS: Last 24 Hours  T(C): 36.7 (12 Jul 2023 05:00), Max: 36.8 (11 Jul 2023 20:16)  T(F): 98.1 (12 Jul 2023 05:00), Max: 98.3 (11 Jul 2023 20:16)  HR: 81 (12 Jul 2023 09:00) (76 - 88)  BP: 110/58 (12 Jul 2023 05:00) (110/58 - 118/66)  BP(mean): --  RR: 18 (12 Jul 2023 05:00) (18 - 18)  SpO2: 100% (12 Jul 2023 09:00) (100% - 100%)      07-11-23 @ 07:01  -  07-12-23 @ 07:00  --------------------------------------------------------  IN: 1710 mL / OUT: 1000 mL / NET: 710 mL                                             --------------------------------------------------------------  LABS:                        7.6    10.57 )-----------( 309      ( 12 Jul 2023 08:59 )             24.1     07-12    140  |  101  |  45<H>  ----------------------------<  162<H>  4.7   |  29  |  2.1<H>    Ca    9.0      12 Jul 2023 08:59  Phos  1.4     07-12  Mg     2.0     07-12        Urinalysis Basic - ( 12 Jul 2023 08:59 )    Color: x / Appearance: x / SG: x / pH: x  Gluc: 162 mg/dL / Ketone: x  / Bili: x / Urobili: x   Blood: x / Protein: x / Nitrite: x   Leuk Esterase: x / RBC: x / WBC x   Sq Epi: x / Non Sq Epi: x / Bacteria: x                                                            --------------------------------------------------------------  PHYSICAL EXAM:  GENERAL: NAD, lying in bed   HEENT: Atraumatic, neck trach in place  LUNGS: B/L air entry, no adventitious breath sounds   HEART: Regular rate and rhythm. Normal s1s2.    ABD: Soft, non-tender, non-distended. Bowel sounds present. Peg in place.   EXT: B/L LE pitting edema, swollen arms and legs, contractions in hands      PLAN:    # DVT prophylaxis     # GI prophylaxis     # Diet     # Activity Score (AM-PAC)    #Progress Note Handoff  Pending (specify):  Family discussion:   Disposition:

## 2023-07-12 NOTE — PROGRESS NOTE ADULT - ASSESSMENT
ASSESSMENT   fluid overload and anasarca  - multiple areas of clean but deep decubiti  - DM  - hyponatremia  - ? DKA on admission  - A fib  - h/o recurrent C diff - cont to have loose brown BMs -  hypophosphatemia      SUGGEST:  treat the phos   -  -continue with Peptamen AF      375 ml over 45 min x 4 feeds/d --> 1800 kcal (low % carb) 114 gm protein (whey source), 1200 mg phos, 62 mEq K/d  - check poc glucose prior to each feeding  - f/u phos with pre-HD labs  - limit pre and post feed flushes to 30 ml   ASSESSMENT   fluid overload and anasarca  - multiple areas of clean but deep decubiti  - DM  - hyponatremia  - ? DKA on admission  - A fib  - h/o recurrent C diff - cont to have loose brown BMs -  hypophosphatemia      SUGGEST:  treat the phos   -  -continue with Peptamen AF      375 ml over 45 min x 4 feeds/d --> 1800 kcal (low % carb) 114 gm protein (whey source), 1200 mg phos, 61.5 mEq K/d  - check poc glucose prior to each feeding  - f/u phos with pre-HD labs  - limit pre and post feed flushes to 30 ml

## 2023-07-13 NOTE — PROGRESS NOTE ADULT - ASSESSMENT
64 yo M with  PMH of ICH (2021) s/p trach and PEG, HTN, HLD, T2DM, CAD s/p stents, Recurrent C diff, BIBEMS from [facility] for abnormal labs. Patient non-verbal at baseline - limited history. Per NH chart  have a BUN greater than 200 and creatinine of 4.3 on outpatient labs. Outpatient CXR also w/ new L sided pleural effusion. In ED, patient was hypotensive with Afib with RVR, found to be in acure renal failure, anemic with elevated trops. He was started on Amiodarone GTT, PPI GTT, gastric lavage with coffee ground secretions, started on broad spectrum abx and admitted to MICU  While in MICU, course complicated by DKA, s/p insulin drip, MICHELLE started on HD, acute anemia s/p PRBC transfusion, NSTEMI, downgraded to SDU -> floor. Pending TDC placement for discharge.    # Acute Renal Failure, started on HD 6/1  # Mild Hyperkalemia  # Fluid Overload- Improving- c/w Bumex and HD  # HAGMA- resolved  - HD port created 7/7, received HD 7/7, 7/11  - Nephro on board- likely HD twice a week. Daily BMP, weight, strict I/O  - midodrine 1 hr before HD if SBP <100  - s/p Midodrine - BP better, s/p Sodium Bicarb- acidosis improved  - TTE 6/1 - EF 63%, GIDD   - K+ elevted, restarted Lokelma   - monitor BMP  - repleted phos     # Afib w/ RVR -   # Acute Decompensated CHF  # CAD s/p stents  # NSTEMI type 2  -now rate controlled- c/w Amio and Cardizem  -CHF resolved s/p diuresis  -Seen by cardio 5/31, recommend c/w asa and stating, medical management for NSTEMI    # anemia of chronic disease  - iron WNL  - monitor H/H, keep Hgb > 7  - received multiple blood transfusions  - last transfusion 7/7    # chronic respiratory failure,   vent dependant via trach   trach care    # functional dysphagia,   sp peg   peg care     # Candiduria with U cx C tropicalis  # MDRO UTI and PNA-  s/p Fluconazole  s/p Ceftolozane-tazobactam    # Hyponatremia-resolved    # DKA, resolved  -s/p insulin drip, SSI and tube feeds  Monitor FS     # Diarrhea   GI PCR -ve, C.diff -not detected    cont dignishield     # Hx ICH  # multiple sacral wounds  # functional paraplegia ,   Bedbound from NH  has upper ext contracture total care   c/w keppra 500mg BID for seizure prophylaxis  sacral and buttock wounds , un-stageable  Care instructions per wound care:   Clean sacrum , B/L buttock and upper back wound with vashe wound cleanser, pat dry then apply hydrogel with moist vashe guaze dressing  Q 12 hours   Continue skin barrier  to R heel - monitor progression  Pressure  injury  preventive  measures  Skin  and incontinence care     #Hypotension  Cardizem was discontinued and holding parameters placed on Bumex  AM serum cortisol WNL    #Misc  -DVT prophylaxis: Heparin SQ  -GI prophylaxis: Protonix IV BID  -Diet: Tube feed  -Code status: DNR  -Activity: IAT  -Dispo: pending    #Progress Note Handoff:  Patient remains with very poor overall prognosis, pending candida auris swab cxs for placement, HD tx. as per renal team, monitor h/h   Family discussion: yes, medical team Disposition: SNF , once candida auris  swab results will be available

## 2023-07-13 NOTE — PROGRESS NOTE ADULT - ASSESSMENT
64 YO F  PMH of ICH (2021) s/p trach and PEG, HTN, HLD, T2DM, CAD s/p stents, Recurrent C diff, BIBEMS from [facility] for abnormal labs.   MICHELLE on CKD 3a - severe azotemia  likely due to GIB / hypotension  Started HD on 6/1/23  Acute anemia d/t UGIB  Troponemia  Lactic acidosis resolved   DKA resolved   Afib   -  no hd today   - bumex 2 q 12 / document UO   -   last phos 1.4 not on binders /  please replete to 2   -  h/h noted / tx if h <7 / sat elevated / PIYUSH  25 with HD / increase to 40   -  feed : blake juarez noted / off lokelma   - s/p TDC   - very poor prognosis   will follow

## 2023-07-13 NOTE — PROGRESS NOTE ADULT - ATTENDING COMMENTS
62 y/o Male  with  PMH of ICH (2021) s/p trach and PEG, HTN, HLD, T2DM, CAD s/p stents, Recurrent C diff, BIBEMS from [facility] for abnormal labs. Patient non-verbal at baseline . Per NH chart  have a BUN greater than 200 and creatinine of 4.3 on outpatient labs. Outpatient CXR also w/ new L sided pleural effusion. In ED, patient was hypotensive with Afib with RVR, found to be in acute renal failure, anemic with elevated trops. He was started on Amiodarone GTT, PPI GTT, gastric lavage with coffee ground secretions, started on broad spectrum abx and admitted to MICU  While in MICU, course complicated by DKA, s/p insulin drip, MICHELLE started on HD, acute anemia s/p PRBC transfusion, NSTEMI, downgraded to SDU -> floor than downgraded to Vent Unit.    # Acute Renal Failure, started on HD 6/1, as per Renal team rec.   - daily BMP monitoring, daily weight, daily strict I/O chart  - patient is hypotensive- Bumex transitioned to PO 2 mg once daily to avoid symptomatic hypotension  - s/p permanent HD cath. placement on 7/7/23  - Midodrine prn. tx. prior to HD if b/p is borderline low    # anemia of chronic disease - no gross bleeding events  - 7/7- s/p 1 unit PRBC, 7/10 hg 7.7    # Hyperkalemia- corrected     # Hypotension- d/c Cardizem, continue on Bumex, am serum Cortisol level- 12.3       # chronic respiratory failure, vent dependant via trach , trach care,  Vent management per Pulm. team   # Chronic dysphagia, sp peg, peg care   #Candiduria with U cx C tropicalis  # Afib w/ RVR - now rate controlled # Acute Decompensated CHF  CAD s/p stents  NSTEMI, medical management   # DKA, resolved  # Hx ICH   Bedbound from NH   - c/w keppra 500mg BID for seizure prophylaxis  # multiple sacral wounds. sacral and buttock , further wound care as per Wound care specialist report, frequent body position change, decub. prevention tx.     # hx of ICH, chronic non-verbal , quadriplegia - continue daily care.     # h/o DM 2- continue bsfs monitoring  with insulin sliding scale co.    DVT proph: Heparin subc. tx.     #Progress Note Handoff:  Patient remains with very poor overall prognosis , pending  candida auris swab cxs for placement, HD tx. as per renal team, monitor h/h   Family discussion: yes, medical team Disposition: SNF , once candida auris  swab results will be available .

## 2023-07-13 NOTE — PROGRESS NOTE ADULT - SUBJECTIVE AND OBJECTIVE BOX
ALICIA BARBOUR 63y Male  MRN#: [id number]     Hospital Day: 44d    Pt is currently admitted with the primary diagnosis of     Overnight events   -No major overnight events  - AM phos repleted                                           ----------------------------------------------------------  OBJECTIVE  PAST MEDICAL & SURGICAL HISTORY  HTN (hypertension)    Diabetes mellitus    H/O intracranial hemorrhage    H/O tracheostomy    PEG (percutaneous endoscopic gastrostomy) status    Hyperlipidemia                                              -----------------------------------------------------------  MEDICATIONS:  STANDING MEDICATIONS  albuterol/ipratropium for Nebulization 3 milliLiter(s) Nebulizer every 6 hours  aMIOdarone    Tablet   Oral   aMIOdarone    Tablet 200 milliGRAM(s) Oral daily  aspirin  chewable 81 milliGRAM(s) Oral daily  atorvastatin 80 milliGRAM(s) Oral at bedtime  buMETAnide 2 milliGRAM(s) Oral daily  chlorhexidine 0.12% Liquid 15 milliLiter(s) Oral Mucosa two times a day  chlorhexidine 2% Cloths 1 Application(s) Topical <User Schedule>  darbepoetin Injectable Syringe 40 MICROGram(s) IV Push <User Schedule>  dextrose 5%. 1000 milliLiter(s) IV Continuous <Continuous>  dextrose 5%. 1000 milliLiter(s) IV Continuous <Continuous>  dextrose 50% Injectable 25 Gram(s) IV Push once  dextrose 50% Injectable 25 Gram(s) IV Push once  dextrose 50% Injectable 12.5 Gram(s) IV Push once  glucagon  Injectable 1 milliGRAM(s) IntraMuscular once  heparin   Injectable 5000 Unit(s) SubCutaneous every 12 hours  insulin lispro (ADMELOG) corrective regimen sliding scale   SubCutaneous three times a day before meals  levETIRAcetam  Solution 500 milliGRAM(s) Enteral Tube two times a day  pantoprazole    Tablet 40 milliGRAM(s) Oral two times a day  polyethylene glycol 3350 17 Gram(s) Oral daily  potassium phosphate / sodium phosphate Powder (PHOS-NaK) 1 Packet(s) Oral every 12 hours  sodium zirconium cyclosilicate 5 Gram(s) Oral daily    PRN MEDICATIONS  acetaminophen     Tablet .. 650 milliGRAM(s) Oral every 6 hours PRN  dextrose Oral Gel 15 Gram(s) Oral once PRN  sodium chloride 0.9% Bolus. 100 milliLiter(s) IV Bolus every 5 minutes PRN  sodium chloride 0.9% Bolus. 100 milliLiter(s) IV Bolus every 5 minutes PRN                                            ------------------------------------------------------------  VITAL SIGNS: Last 24 Hours  T(C): 36.7 (13 Jul 2023 05:03), Max: 36.9 (12 Jul 2023 20:20)  T(F): 98 (13 Jul 2023 05:03), Max: 98.5 (12 Jul 2023 20:20)  HR: 79 (13 Jul 2023 08:32) (77 - 85)  BP: 111/55 (13 Jul 2023 05:03) (111/55 - 118/60)  BP(mean): --  RR: 19 (13 Jul 2023 05:03) (18 - 19)  SpO2: 100% (13 Jul 2023 08:32) (100% - 100%)      07-12-23 @ 07:01  -  07-13-23 @ 07:00  --------------------------------------------------------  IN: 1740 mL / OUT: 0 mL / NET: 1740 mL                                             --------------------------------------------------------------  LABS:                        7.2    10.90 )-----------( 287      ( 13 Jul 2023 13:01 )             23.2     07-13    134<L>  |  98  |  59<H>  ----------------------------<  160<H>  4.9   |  28  |  2.5<H>    Ca    9.2      13 Jul 2023 09:18  Phos  1.6     07-13  Mg     2.1     07-13        Urinalysis Basic - ( 13 Jul 2023 09:18 )    Color: x / Appearance: x / SG: x / pH: x  Gluc: 160 mg/dL / Ketone: x  / Bili: x / Urobili: x   Blood: x / Protein: x / Nitrite: x   Leuk Esterase: x / RBC: x / WBC x   Sq Epi: x / Non Sq Epi: x / Bacteria: x                                                            --------------------------------------------------------------  PHYSICAL EXAM:      PLAN:    # DVT prophylaxis     # GI prophylaxis     # Diet     # Activity Score (AM-PAC)    #Progress Note Handoff  Pending (specify):  Family discussion:   Disposition:

## 2023-07-13 NOTE — PROGRESS NOTE ADULT - SUBJECTIVE AND OBJECTIVE BOX
Over Night Events: events noted, vent dependant, afebrile    PHYSICAL EXAM    ICU Vital Signs Last 24 Hrs  T(C): 36.7 (13 Jul 2023 05:03), Max: 36.9 (12 Jul 2023 20:20)  T(F): 98 (13 Jul 2023 05:03), Max: 98.5 (12 Jul 2023 20:20)  HR: 77 (13 Jul 2023 05:03) (77 - 85)  BP: 111/55 (13 Jul 2023 05:03) (111/55 - 118/60)  RR: 19 (13 Jul 2023 05:03) (18 - 19)  SpO2: 100% (13 Jul 2023 05:03) (100% - 100%)        General: ill looking  HEENT: trach  Lungs: Bilateral BS  Cardiovascular: Regular   Abdomen: Soft, Positive BS  not following commands  sacral ulcer      07-11-23 @ 07:01  -  07-12-23 @ 07:00  --------------------------------------------------------  IN:    Enteral Tube Flush: 210 mL    Peptamen A.F.: 1500 mL  Total IN: 1710 mL    OUT:    Other (mL): 1000 mL  Total OUT: 1000 mL    Total NET: 710 mL      07-12-23 @ 07:01  -  07-13-23 @ 06:14  --------------------------------------------------------  IN:    Enteral Tube Flush: 240 mL    Peptamen A.F.: 1500 mL  Total IN: 1740 mL    OUT:  Total OUT: 0 mL    Total NET: 1740 mL          LABS:                          7.6    10.57 )-----------( 309      ( 12 Jul 2023 08:59 )             24.1                                               07-12    140  |  101  |  45<H>  ----------------------------<  162<H>  4.7   |  29  |  2.1<H>    Ca    9.0      12 Jul 2023 08:59  Phos  1.4     07-12  Mg     2.0     07-12                                               Urinalysis Basic - ( 12 Jul 2023 08:59 )    Color: x / Appearance: x / SG: x / pH: x  Gluc: 162 mg/dL / Ketone: x  / Bili: x / Urobili: x   Blood: x / Protein: x / Nitrite: x   Leuk Esterase: x / RBC: x / WBC x   Sq Epi: x / Non Sq Epi: x / Bacteria: x                                                                                                                                        Mode: AC/ CMV (Assist Control/ Continuous Mandatory Ventilation)  RR (machine): 14  TV (machine): 450  FiO2: 40  PEEP: 8  ITime: 0.8  MAP: 11  PIP: 22                                          MEDICATIONS  (STANDING):  albuterol/ipratropium for Nebulization 3 milliLiter(s) Nebulizer every 6 hours  aMIOdarone    Tablet   Oral   aMIOdarone    Tablet 200 milliGRAM(s) Oral daily  aspirin  chewable 81 milliGRAM(s) Oral daily  atorvastatin 80 milliGRAM(s) Oral at bedtime  buMETAnide 2 milliGRAM(s) Oral daily  chlorhexidine 0.12% Liquid 15 milliLiter(s) Oral Mucosa two times a day  chlorhexidine 2% Cloths 1 Application(s) Topical <User Schedule>  darbepoetin Injectable Syringe 25 MICROGram(s) IV Push <User Schedule>  dextrose 5%. 1000 milliLiter(s) (100 mL/Hr) IV Continuous <Continuous>  dextrose 5%. 1000 milliLiter(s) (50 mL/Hr) IV Continuous <Continuous>  dextrose 50% Injectable 25 Gram(s) IV Push once  dextrose 50% Injectable 25 Gram(s) IV Push once  dextrose 50% Injectable 12.5 Gram(s) IV Push once  glucagon  Injectable 1 milliGRAM(s) IntraMuscular once  heparin   Injectable 5000 Unit(s) SubCutaneous every 12 hours  insulin lispro (ADMELOG) corrective regimen sliding scale   SubCutaneous three times a day before meals  levETIRAcetam  Solution 500 milliGRAM(s) Enteral Tube two times a day  pantoprazole    Tablet 40 milliGRAM(s) Oral two times a day  polyethylene glycol 3350 17 Gram(s) Oral daily  sodium zirconium cyclosilicate 5 Gram(s) Oral daily    MEDICATIONS  (PRN):  acetaminophen     Tablet .. 650 milliGRAM(s) Oral every 6 hours PRN Temp greater or equal to 38C (100.4F), Mild Pain (1 - 3)  dextrose Oral Gel 15 Gram(s) Oral once PRN Blood Glucose LESS THAN 70 milliGRAM(s)/deciliter  sodium chloride 0.9% Bolus. 100 milliLiter(s) IV Bolus every 5 minutes PRN SBP LESS THAN or EQUAL to 90 mmHg  sodium chloride 0.9% Bolus. 100 milliLiter(s) IV Bolus every 5 minutes PRN SBP LESS THAN or EQUAL to 80 mmHg

## 2023-07-13 NOTE — PROGRESS NOTE ADULT - ASSESSMENT
IMPRESSION:    Sepsis present on admission resolved  Candida UTI sp tx  Pseudomonas PNA sp abx  anemia  left side atelectasis improved  Bilateral pleural effusions likely volume overload   MICHELLE on CKD III on dialysis   NSTEMI likely type 2  Hyponatremia resolved   Paroxysmal Afib  Acute on chronic anemia suspected UGI Bleed  Chronic respiratory failure  H/o ICH s/p trach and PEG  H/o CAD      PLAN:    CNS: Avoid CNS depressants     HEENT: Oral and trach care    PULMONARY: HOB 45. Aspiration.  No vent changes.  Goal saturation 92-96%. Vent dependent.      CARDIOVASCULAR: Avoid overload.      GI:  Feeding  Bowel regimen     RENAL: trend CMP.  Correct as needed.  Nephrology following. HD per renal     INFECTIOUS DISEASE: ABX per ID.      HEMATOLOGICAL: DVT prophylaxis.     ENDOCRINE: Follow up FS. Insulin protocol if needed.    DNR    Poor prognosis   dc planning  palliative care fup

## 2023-07-13 NOTE — PROGRESS NOTE ADULT - SUBJECTIVE AND OBJECTIVE BOX
seen and examined  24 h events noted   trach/vent       PAST HISTORY  --------------------------------------------------------------------------------  No significant changes to PMH, PSH, FHx, SHx, unless otherwise noted    ALLERGIES & MEDICATIONS  --------------------------------------------------------------------------------  Allergies    penicillin (Other)    Intolerances      Standing Inpatient Medications  albuterol/ipratropium for Nebulization 3 milliLiter(s) Nebulizer every 6 hours  aMIOdarone    Tablet 200 milliGRAM(s) Oral daily  aMIOdarone    Tablet   Oral   aspirin  chewable 81 milliGRAM(s) Oral daily  atorvastatin 80 milliGRAM(s) Oral at bedtime  buMETAnide 2 milliGRAM(s) Oral daily  chlorhexidine 0.12% Liquid 15 milliLiter(s) Oral Mucosa two times a day  chlorhexidine 2% Cloths 1 Application(s) Topical <User Schedule>  darbepoetin Injectable Syringe 25 MICROGram(s) IV Push <User Schedule>  dextrose 5%. 1000 milliLiter(s) IV Continuous <Continuous>  dextrose 5%. 1000 milliLiter(s) IV Continuous <Continuous>  dextrose 50% Injectable 12.5 Gram(s) IV Push once  dextrose 50% Injectable 25 Gram(s) IV Push once  dextrose 50% Injectable 25 Gram(s) IV Push once  glucagon  Injectable 1 milliGRAM(s) IntraMuscular once  heparin   Injectable 5000 Unit(s) SubCutaneous every 12 hours  insulin lispro (ADMELOG) corrective regimen sliding scale   SubCutaneous three times a day before meals  levETIRAcetam  Solution 500 milliGRAM(s) Enteral Tube two times a day  pantoprazole    Tablet 40 milliGRAM(s) Oral two times a day  polyethylene glycol 3350 17 Gram(s) Oral daily  sodium zirconium cyclosilicate 5 Gram(s) Oral daily    PRN Inpatient Medications  acetaminophen     Tablet .. 650 milliGRAM(s) Oral every 6 hours PRN  dextrose Oral Gel 15 Gram(s) Oral once PRN  sodium chloride 0.9% Bolus. 100 milliLiter(s) IV Bolus every 5 minutes PRN  sodium chloride 0.9% Bolus. 100 milliLiter(s) IV Bolus every 5 minutes PRN        VITALS/PHYSICAL EXAM  --------------------------------------------------------------------------------  T(C): 36.7 (07-13-23 @ 05:03), Max: 36.9 (07-12-23 @ 20:20)  HR: 77 (07-13-23 @ 05:03) (77 - 85)  BP: 111/55 (07-13-23 @ 05:03) (111/55 - 118/60)  RR: 19 (07-13-23 @ 05:03) (18 - 19)  SpO2: 100% (07-13-23 @ 05:03) (100% - 100%)  Wt(kg): --        07-12-23 @ 07:01  -  07-13-23 @ 07:00  --------------------------------------------------------  IN: 1740 mL / OUT: 0 mL / NET: 1740 mL      Physical Exam:  	Gen: trach/vent  	Pulm: B/L aline   	CV:  S1S2; no rub  	Abd: +distended  	LE: edema  	Vascular access:tdc     LABS/STUDIES  --------------------------------------------------------------------------------              7.6    10.57 >-----------<  309      [07-12-23 @ 08:59]              24.1     140  |  101  |  45  ----------------------------<  162      [07-12-23 @ 08:59]  4.7   |  29  |  2.1        Ca     9.0     [07-12-23 @ 08:59]      Mg     2.0     [07-12-23 @ 08:59]      Phos  1.4     [07-12-23 @ 08:59]        Creatinine Trend:  SCr 2.1 [07-12 @ 08:59]  SCr 1.8 [07-11 @ 16:58]  SCr 2.6 [07-10 @ 06:15]  SCr 2.3 [07-08 @ 10:15]  SCr 2.0 [07-07 @ 10:40]    Urinalysis - [07-12-23 @ 08:59]      Color  / Appearance  / SG  / pH       Gluc 162 / Ketone   / Bili  / Urobili        Blood  / Protein  / Leuk Est  / Nitrite       RBC  / WBC  / Hyaline  / Gran  / Sq Epi  / Non Sq Epi  / Bacteria       Iron 51, TIBC 111, %sat 46      [06-06-23 @ 10:40]  Ferritin 1956      [06-06-23 @ 10:40]  Vitamin D (25OH) 70      [06-03-23 @ 06:50]    HBsAg Nonreact      [07-11-23 @ 16:58]  HCV 0.16, Nonreact      [07-11-23 @ 16:58]

## 2023-07-14 NOTE — PROGRESS NOTE ADULT - SUBJECTIVE AND OBJECTIVE BOX
Over Night Events: events noted, vent dependant, afebrile, renal noted    PHYSICAL EXAM    ICU Vital Signs Last 24 Hrs  T(C): 36.1 (14 Jul 2023 05:11), Max: 36.6 (13 Jul 2023 13:00)  T(F): 97 (14 Jul 2023 05:11), Max: 97.9 (13 Jul 2023 13:00)  HR: 78 (14 Jul 2023 05:11) (75 - 82)  BP: 102/55 (14 Jul 2023 05:11) (102/55 - 112/60)  RR: 19 (14 Jul 2023 05:11) (18 - 20)  SpO2: 98% (14 Jul 2023 05:11) (98% - 100%)    O2 Parameters below as of 14 Jul 2023 05:11  Patient On (Oxygen Delivery Method): ventilator            General: ill looking  HEENT: trach  Lungs: Bilateral BS  Cardiovascular: Regular   Abdomen: Soft, Positive BS  unresponsive    07-12-23 @ 07:01  -  07-13-23 @ 07:00  --------------------------------------------------------  IN:    Enteral Tube Flush: 240 mL    Peptamen A.F.: 1500 mL  Total IN: 1740 mL    OUT:  Total OUT: 0 mL    Total NET: 1740 mL      07-13-23 @ 07:01  -  07-14-23 @ 05:45  --------------------------------------------------------  IN:    Enteral Tube Flush: 120 mL    Peptamen A.F.: 750 mL  Total IN: 870 mL    OUT:  Total OUT: 0 mL    Total NET: 870 mL          LABS:                          7.2    10.90 )-----------( 287      ( 13 Jul 2023 13:01 )             23.2                                               07-13    134<L>  |  98  |  59<H>  ----------------------------<  160<H>  4.9   |  28  |  2.5<H>    Ca    9.2      13 Jul 2023 09:18  Phos  1.6     07-13  Mg     2.1     07-13                                               Urinalysis Basic - ( 13 Jul 2023 09:18 )    Color: x / Appearance: x / SG: x / pH: x  Gluc: 160 mg/dL / Ketone: x  / Bili: x / Urobili: x   Blood: x / Protein: x / Nitrite: x   Leuk Esterase: x / RBC: x / WBC x   Sq Epi: x / Non Sq Epi: x / Bacteria: x                                                                                                                                        Mode: AC/ CMV (Assist Control/ Continuous Mandatory Ventilation)  RR (machine): 14  TV (machine): 450  FiO2: 40  PEEP: 8  ITime: 0  MAP: 21  PIP: 24                                          MEDICATIONS  (STANDING):  albuterol/ipratropium for Nebulization 3 milliLiter(s) Nebulizer every 6 hours  aMIOdarone    Tablet 200 milliGRAM(s) Oral daily  aMIOdarone    Tablet   Oral   aspirin  chewable 81 milliGRAM(s) Oral daily  atorvastatin 80 milliGRAM(s) Oral at bedtime  buMETAnide 2 milliGRAM(s) Oral daily  chlorhexidine 0.12% Liquid 15 milliLiter(s) Oral Mucosa two times a day  chlorhexidine 2% Cloths 1 Application(s) Topical <User Schedule>  darbepoetin Injectable Syringe 40 MICROGram(s) IV Push <User Schedule>  dextrose 5%. 1000 milliLiter(s) (50 mL/Hr) IV Continuous <Continuous>  dextrose 5%. 1000 milliLiter(s) (100 mL/Hr) IV Continuous <Continuous>  dextrose 50% Injectable 12.5 Gram(s) IV Push once  dextrose 50% Injectable 25 Gram(s) IV Push once  dextrose 50% Injectable 25 Gram(s) IV Push once  glucagon  Injectable 1 milliGRAM(s) IntraMuscular once  heparin   Injectable 5000 Unit(s) SubCutaneous every 12 hours  insulin lispro (ADMELOG) corrective regimen sliding scale   SubCutaneous three times a day before meals  levETIRAcetam  Solution 500 milliGRAM(s) Enteral Tube two times a day  pantoprazole    Tablet 40 milliGRAM(s) Oral two times a day  polyethylene glycol 3350 17 Gram(s) Oral daily  potassium phosphate / sodium phosphate Powder (PHOS-NaK) 1 Packet(s) Oral every 12 hours  sodium zirconium cyclosilicate 5 Gram(s) Oral daily    MEDICATIONS  (PRN):  acetaminophen     Tablet .. 650 milliGRAM(s) Oral every 6 hours PRN Temp greater or equal to 38C (100.4F), Mild Pain (1 - 3)  dextrose Oral Gel 15 Gram(s) Oral once PRN Blood Glucose LESS THAN 70 milliGRAM(s)/deciliter  sodium chloride 0.9% Bolus. 100 milliLiter(s) IV Bolus every 5 minutes PRN SBP LESS THAN or EQUAL to 80 mmHg  sodium chloride 0.9% Bolus. 100 milliLiter(s) IV Bolus every 5 minutes PRN SBP LESS THAN or EQUAL to 90 mmHg

## 2023-07-14 NOTE — PROGRESS NOTE ADULT - SUBJECTIVE AND OBJECTIVE BOX
Nephrology progress note    THIS IS AN INCOMPLETE NOTE . FULL NOTE TO FOLLOW SHORTLY    Patient is seen and examined, events over the last 24 h noted .    Allergies:  penicillin (Other)    Hospital Medications:   MEDICATIONS  (STANDING):  albuterol/ipratropium for Nebulization 3 milliLiter(s) Nebulizer every 6 hours  aMIOdarone    Tablet 200 milliGRAM(s) Oral daily  aMIOdarone    Tablet   Oral   aspirin  chewable 81 milliGRAM(s) Oral daily  atorvastatin 80 milliGRAM(s) Oral at bedtime  buMETAnide 2 milliGRAM(s) Oral daily  chlorhexidine 0.12% Liquid 15 milliLiter(s) Oral Mucosa two times a day  chlorhexidine 2% Cloths 1 Application(s) Topical <User Schedule>  darbepoetin Injectable Syringe 40 MICROGram(s) IV Push <User Schedule>  dextrose 5%. 1000 milliLiter(s) (50 mL/Hr) IV Continuous <Continuous>  dextrose 5%. 1000 milliLiter(s) (100 mL/Hr) IV Continuous <Continuous>  dextrose 50% Injectable 12.5 Gram(s) IV Push once  dextrose 50% Injectable 25 Gram(s) IV Push once  dextrose 50% Injectable 25 Gram(s) IV Push once  glucagon  Injectable 1 milliGRAM(s) IntraMuscular once  heparin   Injectable 5000 Unit(s) SubCutaneous every 12 hours  insulin lispro (ADMELOG) corrective regimen sliding scale   SubCutaneous three times a day before meals  levETIRAcetam  Solution 500 milliGRAM(s) Enteral Tube two times a day  pantoprazole    Tablet 40 milliGRAM(s) Oral two times a day  polyethylene glycol 3350 17 Gram(s) Oral daily  sodium zirconium cyclosilicate 5 Gram(s) Oral daily        VITALS:  T(F): 97 (07-14-23 @ 05:11), Max: 97.9 (07-13-23 @ 13:00)  HR: 78 (07-14-23 @ 05:11)  BP: 102/55 (07-14-23 @ 05:11)  RR: 19 (07-14-23 @ 05:11)  SpO2: 98% (07-14-23 @ 05:11)  Wt(kg): --    07-12 @ 07:01  -  07-13 @ 07:00  --------------------------------------------------------  IN: 1740 mL / OUT: 0 mL / NET: 1740 mL    07-13 @ 07:01  -  07-14 @ 07:00  --------------------------------------------------------  IN: 1740 mL / OUT: 0 mL / NET: 1740 mL          PHYSICAL EXAM:  Constitutional: NAD  HEENT: anicteric sclera, oropharynx clear, MMM  Neck: No JVD  Respiratory: CTAB, no wheezes, rales or rhonchi  Cardiovascular: S1, S2, RRR  Gastrointestinal: BS+, soft, NT/ND  Extremities: No cyanosis or clubbing. No peripheral edema  :  No teixeira.   Skin: No rashes    LABS:  07-13    134<L>  |  98  |  59<H>  ----------------------------<  160<H>  4.9   |  28  |  2.5<H>    Ca    9.2      13 Jul 2023 09:18  Phos  1.6     07-13  Mg     2.1     07-13                            7.2    10.65 )-----------( 329      ( 14 Jul 2023 07:22 )             23.2       Urine Studies:  Urinalysis Basic - ( 13 Jul 2023 09:18 )    Color:  / Appearance:  / SG:  / pH:   Gluc: 160 mg/dL / Ketone:   / Bili:  / Urobili:    Blood:  / Protein:  / Nitrite:    Leuk Esterase:  / RBC:  / WBC    Sq Epi:  / Non Sq Epi:  / Bacteria:           Iron 51, TIBC 111, %sat 46      [06-06-23 @ 10:40]  Ferritin 1956      [06-06-23 @ 10:40]  Vitamin D (25OH) 70      [06-03-23 @ 06:50]    HBsAb <3.0      [06-01-23 @ 22:50]  HBsAb Nonreact      [06-01-23 @ 22:50]  HBsAg Nonreact      [07-11-23 @ 16:58]  HBcAb Nonreact      [06-01-23 @ 22:50]  HCV 0.16, Nonreact      [07-11-23 @ 16:58]  HIV Nonreact      [06-08-23 @ 16:00]  HIV Nonreact      [06-08-23 @ 12:20]        RADIOLOGY & ADDITIONAL STUDIES:   Nephrology progress note    Patient is seen and examined, events over the last 24 h noted .  Lying in bed comfortable     Allergies:  penicillin (Other)    Hospital Medications:   MEDICATIONS  (STANDING):  albuterol/ipratropium for Nebulization 3 milliLiter(s) Nebulizer every 6 hours  aMIOdarone    Tablet 200 milliGRAM(s) Oral daily  aspirin  chewable 81 milliGRAM(s) Oral daily  atorvastatin 80 milliGRAM(s) Oral at bedtime  buMETAnide 2 milliGRAM(s) Oral daily  darbepoetin Injectable Syringe 40 MICROGram(s) IV Push <User Schedule>  glucagon  Injectable 1 milliGRAM(s) IntraMuscular once  heparin   Injectable 5000 Unit(s) SubCutaneous every 12 hours  insulin lispro (ADMELOG) corrective regimen sliding scale   SubCutaneous three times a day before meals  levETIRAcetam  Solution 500 milliGRAM(s) Enteral Tube two times a day  pantoprazole    Tablet 40 milliGRAM(s) Oral two times a day  polyethylene glycol 3350 17 Gram(s) Oral daily  sodium zirconium cyclosilicate 5 Gram(s) Oral daily        VITALS:  T(F): 97 (07-14-23 @ 05:11), Max: 97.9 (07-13-23 @ 13:00)  HR: 78 (07-14-23 @ 05:11)  BP: 102/55 (07-14-23 @ 05:11)  RR: 19 (07-14-23 @ 05:11)  SpO2: 98% (07-14-23 @ 05:11)      07-12 @ 07:01  -  07-13 @ 07:00  --------------------------------------------------------  IN: 1740 mL / OUT: 0 mL / NET: 1740 mL    07-13 @ 07:01  -  07-14 @ 07:00  --------------------------------------------------------  IN: 1740 mL / OUT: 0 mL / NET: 1740 mL          PHYSICAL EXAM:  Constitutional: trached   Respiratory: CTAB,  Cardiovascular: S1, S2, RRR  Gastrointestinal: BS+, soft, NT/ND  Extremities: No cyanosis or clubbing. No peripheral edema  :  No teixeira.   Skin: No rashes    LABS:  07-14    134<L>  |  98  |  68<HH>  ----------------------------<  137<H>  5.5<H>   |  27  |  2.8<H>    Ca    9.2      14 Jul 2023 07:22  Phos  2.0     07-14  Mg     2.2     07-14 07-13    134<L>  |  98  |  59<H>  ----------------------------<  160<H>  4.9   |  28  |  2.5<H>    Ca    9.2      13 Jul 2023 09:18  Phos  1.6     07-13  Mg     2.1     07-13                            7.2    10.65 )-----------( 329      ( 14 Jul 2023 07:22 )             23.2       Urine Studies:  Urinalysis Basic - ( 13 Jul 2023 09:18 )    Color:  / Appearance:  / SG:  / pH:   Gluc: 160 mg/dL / Ketone:   / Bili:  / Urobili:    Blood:  / Protein:  / Nitrite:    Leuk Esterase:  / RBC:  / WBC    Sq Epi:  / Non Sq Epi:  / Bacteria:           Iron 51, TIBC 111, %sat 46      [06-06-23 @ 10:40]  Ferritin 1956      [06-06-23 @ 10:40]  Vitamin D (25OH) 70      [06-03-23 @ 06:50]    HBsAb <3.0      [06-01-23 @ 22:50]  HBsAb Nonreact      [06-01-23 @ 22:50]  HBsAg Nonreact      [07-11-23 @ 16:58]  HBcAb Nonreact      [06-01-23 @ 22:50]  HCV 0.16, Nonreact      [07-11-23 @ 16:58]  HIV Nonreact      [06-08-23 @ 16:00]  HIV Nonreact      [06-08-23 @ 12:20]        RADIOLOGY & ADDITIONAL STUDIES:

## 2023-07-14 NOTE — PROGRESS NOTE ADULT - ATTENDING COMMENTS
62 y/o Male  with  PMH of ICH (2021) s/p trach and PEG, HTN, HLD, T2DM, CAD s/p stents, Recurrent C diff, BIBEMS from [facility] for abnormal labs. Patient non-verbal at baseline . Per NH chart  have a BUN greater than 200 and creatinine of 4.3 on outpatient labs. Outpatient CXR also w/ new L sided pleural effusion. In ED, patient was hypotensive with Afib with RVR, found to be in acute renal failure, anemic with elevated trops. He was started on Amiodarone GTT, PPI GTT, gastric lavage with coffee ground secretions, started on broad spectrum abx and admitted to MICU  While in MICU, course complicated by DKA, s/p insulin drip, MICHELLE started on HD, acute anemia s/p PRBC transfusion, NSTEMI, downgraded to SDU -> floor than downgraded to Vent Unit.    # Acute Renal Failure, started on HD 6/1, as per Renal team rec.   - daily BMP monitoring, daily weight, daily strict I/O chart  - patient is hypotensive- Bumex transitioned to PO 2 mg once daily to avoid symptomatic hypotension  - s/p permanent HD cath. placement on 7/7/23  - Midodrine prn. tx. prior to HD if b/p is borderline low    # anemia of chronic disease - no gross bleeding events  - 7/7- s/p 1 unit PRBC, 7/10 hg 7.7    # Hyperkalemia- corrected     # Hypotension- d/c Cardizem, continue on Bumex, am serum Cortisol level- 12.3       # chronic respiratory failure, vent dependant via trach , trach care,  Vent management per Pulm. team   # Chronic dysphagia, sp peg, peg care   #Candiduria with U cx C tropicalis  # Afib w/ RVR - now rate controlled # Acute Decompensated CHF  CAD s/p stents  NSTEMI, medical management   # DKA, resolved  # Hx ICH   Bedbound from NH   - c/w keppra 500mg BID for seizure prophylaxis  # multiple sacral wounds. sacral and buttock , further wound care as per Wound care specialist report, frequent body position change, decub. prevention tx.     # hx of ICH, chronic non-verbal , quadriplegia - continue daily care.     # h/o DM 2- continue bsfs monitoring  with insulin sliding scale co.    DVT proph: Heparin subc. tx.     #Progress Note Handoff:  Patient remains with very poor overall prognosis , pending  candida auris swab cxs for placement, HD tx. as per renal team, monitor h/h   Family discussion: yes, medical team Disposition: SNF , once candida auris  swab results will be available .

## 2023-07-14 NOTE — PROGRESS NOTE ADULT - ASSESSMENT
IMPRESSION:    Sepsis present on admission resolved  Candida UTI sp tx  Pseudomonas PNA sp abx  anemia  left side atelectasis improved  Bilateral pleural effusions likely volume overload   MICHELLE on CKD III on dialysis   NSTEMI likely type 2  Hyponatremia resolved   Paroxysmal Afib  Acute on chronic anemia suspected UGI Bleed  Chronic respiratory failure  H/o ICH s/p trach and PEG  H/o CAD      PLAN:    CNS: Avoid CNS depressants     HEENT: Oral and trach care    PULMONARY: HOB 45. Aspiration.  No vent changes.  Goal saturation 92-96%. Vent dependent.      CARDIOVASCULAR: Avoid overload.      GI:  Feeding  Bowel regimen     RENAL: trend CMP.  Correct as needed.  Nephrology following. HD per renal     INFECTIOUS DISEASE: ABX per ID.      HEMATOLOGICAL: DVT prophylaxis.     ENDOCRINE: Follow up FS. Insulin protocol if needed.    DNR    Poor prognosis  dc planning

## 2023-07-14 NOTE — PROGRESS NOTE ADULT - ASSESSMENT
62 yo M with  PMH of ICH (2021) s/p trach and PEG, HTN, HLD, T2DM, CAD s/p stents, Recurrent C diff, BIBEMS from [facility] for abnormal labs. Patient non-verbal at baseline - limited history. Per NH chart  have a BUN greater than 200 and creatinine of 4.3 on outpatient labs. Outpatient CXR also w/ new L sided pleural effusion. In ED, patient was hypotensive with Afib with RVR, found to be in acure renal failure, anemic with elevated trops. He was started on Amiodarone GTT, PPI GTT, gastric lavage with coffee ground secretions, started on broad spectrum abx and admitted to MICU  While in MICU, course complicated by DKA, s/p insulin drip, MICHELLE started on HD, acute anemia s/p PRBC transfusion, NSTEMI, downgraded to SDU -> floor. pending placement.    # Acute Renal Failure, started on HD 6/1  # Mild Hyperkalemia  # Fluid Overload- Improving- c/w Bumex and HD  # HAGMA- resolved  - HD port created 7/7, received HD 7/7, 7/11  - Nephro on board- likely HD twice a week. Daily BMP, weight, strict I/O  - midodrine 1 hr before HD if SBP <100  - s/p Midodrine - BP better, s/p Sodium Bicarb- acidosis improved  - TTE 6/1 - EF 63%, GIDD   - K+ elevted, off Lokelma   - monitor BMP    # Afib w/ RVR -   # Acute Decompensated CHF  # CAD s/p stents  # NSTEMI type 2  -now rate controlled- c/w Amio and Cardizem  -CHF resolved s/p diuresis  -Seen by cardio 5/31, recommend c/w asa and stating, medical management for NSTEMI    # anemia of chronic disease  - iron WNL  - monitor H/H, keep Hgb > 7  - received multiple blood transfusions  - last transfusion 7/7    # chronic respiratory failure,   vent dependant via trach   trach care    # functional dysphagia,   sp peg   peg care     # Candiduria with U cx C tropicalis  # MDRO UTI and PNA-  s/p Fluconazole  s/p Ceftolozane-tazobactam    # Hyponatremia-resolved    # DKA, resolved  -s/p insulin drip, SSI and tube feeds  Monitor FS     # Diarrhea   GI PCR -ve, C.diff -not detected    cont dignishield     # Hx ICH  # multiple sacral wounds  # functional paraplegia ,   Bedbound from NH  has upper ext contracture total care   c/w keppra 500mg BID for seizure prophylaxis  sacral and buttock wounds , un-stageable  Care instructions per wound care:   Clean sacrum , B/L buttock and upper back wound with vashe wound cleanser, pat dry then apply hydrogel with moist vashe guaze dressing  Q 12 hours   Continue skin barrier  to R heel - monitor progression  Pressure  injury  preventive  measures  Skin  and incontinence care     #Hypotension  Cardizem was discontinued and holding parameters placed on Bumex  AM serum cortisol WNL    #Misc  -DVT prophylaxis: Heparin SQ  -GI prophylaxis: Protonix IV BID  -Diet: Tube feed  -Code status: DNR  -Activity: IAT  -Dispo: pending    #Progress Note Handoff:  Patient remains with very poor overall prognosis, pending candida auris swab cxs for placement, HD tx. as per renal team, monitor h/h   Family discussion: yes, medical team Disposition: SNF , once candida auris  swab results will be available

## 2023-07-14 NOTE — PROGRESS NOTE ADULT - SUBJECTIVE AND OBJECTIVE BOX
ALICIA BARBOUR 63y Male  MRN#: [id number]     Hospital Day: 45d    Pt is currently admitted with the primary diagnosis of     Overnight events   -No major overnight events                                          ----------------------------------------------------------  OBJECTIVE  PAST MEDICAL & SURGICAL HISTORY  HTN (hypertension)    Diabetes mellitus    H/O intracranial hemorrhage    H/O tracheostomy    PEG (percutaneous endoscopic gastrostomy) status    Hyperlipidemia                                              -----------------------------------------------------------  MEDICATIONS:  STANDING MEDICATIONS  albuterol/ipratropium for Nebulization 3 milliLiter(s) Nebulizer every 6 hours  aMIOdarone    Tablet   Oral   aMIOdarone    Tablet 200 milliGRAM(s) Oral daily  aspirin  chewable 81 milliGRAM(s) Oral daily  atorvastatin 80 milliGRAM(s) Oral at bedtime  buMETAnide 2 milliGRAM(s) Oral daily  chlorhexidine 0.12% Liquid 15 milliLiter(s) Oral Mucosa two times a day  chlorhexidine 2% Cloths 1 Application(s) Topical <User Schedule>  darbepoetin Injectable Syringe 40 MICROGram(s) IV Push <User Schedule>  dextrose 5%. 1000 milliLiter(s) IV Continuous <Continuous>  dextrose 5%. 1000 milliLiter(s) IV Continuous <Continuous>  dextrose 50% Injectable 25 Gram(s) IV Push once  dextrose 50% Injectable 25 Gram(s) IV Push once  dextrose 50% Injectable 12.5 Gram(s) IV Push once  glucagon  Injectable 1 milliGRAM(s) IntraMuscular once  heparin   Injectable 5000 Unit(s) SubCutaneous every 12 hours  insulin lispro (ADMELOG) corrective regimen sliding scale   SubCutaneous three times a day before meals  levETIRAcetam  Solution 500 milliGRAM(s) Enteral Tube two times a day  pantoprazole    Tablet 40 milliGRAM(s) Oral two times a day  polyethylene glycol 3350 17 Gram(s) Oral daily  sodium zirconium cyclosilicate 5 Gram(s) Oral daily    PRN MEDICATIONS  acetaminophen     Tablet .. 650 milliGRAM(s) Oral every 6 hours PRN  dextrose Oral Gel 15 Gram(s) Oral once PRN  sodium chloride 0.9% Bolus. 100 milliLiter(s) IV Bolus every 5 minutes PRN  sodium chloride 0.9% Bolus. 100 milliLiter(s) IV Bolus every 5 minutes PRN                                            ------------------------------------------------------------  VITAL SIGNS: Last 24 Hours  T(C): 36.1 (14 Jul 2023 05:11), Max: 36.4 (13 Jul 2023 19:14)  T(F): 97 (14 Jul 2023 05:11), Max: 97.5 (13 Jul 2023 19:14)  HR: 80 (14 Jul 2023 08:50) (78 - 85)  BP: 102/55 (14 Jul 2023 05:11) (102/55 - 112/60)  BP(mean): --  RR: 19 (14 Jul 2023 05:11) (18 - 19)  SpO2: 100% (14 Jul 2023 08:50) (98% - 100%)      07-13-23 @ 07:01  -  07-14-23 @ 07:00  --------------------------------------------------------  IN: 1740 mL / OUT: 0 mL / NET: 1740 mL                                             --------------------------------------------------------------  LABS:                        7.2    10.65 )-----------( 329      ( 14 Jul 2023 07:22 )             23.2     07-14    134<L>  |  98  |  68<HH>  ----------------------------<  137<H>  5.5<H>   |  27  |  2.8<H>    Ca    9.2      14 Jul 2023 07:22  Phos  2.0     07-14  Mg     2.2     07-14        Urinalysis Basic - ( 14 Jul 2023 07:22 )    Color: x / Appearance: x / SG: x / pH: x  Gluc: 137 mg/dL / Ketone: x  / Bili: x / Urobili: x   Blood: x / Protein: x / Nitrite: x   Leuk Esterase: x / RBC: x / WBC x   Sq Epi: x / Non Sq Epi: x / Bacteria: x                                                            --------------------------------------------------------------  PHYSICAL EXAM:  GENERAL: NAD, lying in bed   HEENT: Atraumatic, neck trach in place  LUNGS: B/L air entry, no adventitious breath sounds   HEART: Regular rate and rhythm. Normal s1s2.    ABD: Soft, non-distended. Bowel sounds present. Peg in place.  EXT: B/L LE pitting edema, swollen arms and legs, contractions in hands  -- Pt winces to palpation of abd and extremities     PLAN:    # DVT prophylaxis     # GI prophylaxis     # Diet     # Activity Score (AM-PAC)    #Progress Note Handoff  Pending (specify):  Family discussion:   Disposition:

## 2023-07-14 NOTE — PROGRESS NOTE ADULT - ASSESSMENT
64 YO F  PMH of ICH (2021) s/p trach and PEG, HTN, HLD, T2DM, CAD s/p stents, Recurrent C diff, BIBEMS from [facility] for abnormal labs.     MICHELLE on CKD 3a - severe azotemia  likely due to GIB / hypotension  Started HD on 6/1/23  Acute anemia d/t UGIB  Troponemia  Lactic acidosis resolved   DKA resolved   Afib   -  HD in AM / has HD 2 x /week   -  bumex 2 q 12 / document UO / flowsheet = 0 cc   -  last phos 1.4 not on binders /  please replete to 2   -  h/h noted / tx if h <7 / sat elevated / PIYUSH  25 with HD / increased to 40   -  feed : blake juarez noted / off lokelma   - s/p TDC   - very poor prognosis   will follow

## 2023-07-15 NOTE — PROGRESS NOTE ADULT - SUBJECTIVE AND OBJECTIVE BOX
Over Night Events: events noted, vent dependant, renal reviewed, low grade fever    PHYSICAL EXAM    ICU Vital Signs Last 24 Hrs  T(C): 37.2 (15 Jul 2023 05:00), Max: 37.3 (14 Jul 2023 13:00)  T(F): 99 (15 Jul 2023 05:00), Max: 99.1 (14 Jul 2023 13:00)  HR: 73 (15 Jul 2023 05:00) (73 - 85)  BP: 143/63 (15 Jul 2023 05:00) (117/57 - 143/63)  RR: 18 (15 Jul 2023 05:00) (18 - 18)  SpO2: 100% (15 Jul 2023 05:00) (100% - 100%)        General: ill looking  HEENT: trac  Lungs: Bilateral BS  Cardiovascular: Regular   Abdomen: Soft, Positive BS  sacral ulcer  unresponsive      07-13-23 @ 07:01  -  07-14-23 @ 07:00  --------------------------------------------------------  IN:    Enteral Tube Flush: 240 mL    Peptamen A.F.: 1500 mL  Total IN: 1740 mL    OUT:  Total OUT: 0 mL    Total NET: 1740 mL      07-14-23 @ 07:01  -  07-15-23 @ 06:36  --------------------------------------------------------  IN:    Enteral Tube Flush: 180 mL    Peptamen A.F.: 1125 mL  Total IN: 1305 mL    OUT:  Total OUT: 0 mL    Total NET: 1305 mL          LABS:                          7.2    10.65 )-----------( 329      ( 14 Jul 2023 07:22 )             23.2                                               07-14    134<L>  |  98  |  68<HH>  ----------------------------<  137<H>  5.5<H>   |  27  |  2.8<H>    Ca    9.2      14 Jul 2023 07:22  Phos  2.0     07-14  Mg     2.2     07-14                                               Urinalysis Basic - ( 14 Jul 2023 07:22 )    Color: x / Appearance: x / SG: x / pH: x  Gluc: 137 mg/dL / Ketone: x  / Bili: x / Urobili: x   Blood: x / Protein: x / Nitrite: x   Leuk Esterase: x / RBC: x / WBC x   Sq Epi: x / Non Sq Epi: x / Bacteria: x                                                                                                                                        Mode: AC/ CMV (Assist Control/ Continuous Mandatory Ventilation)  RR (machine): 14  TV (machine): 450  FiO2: 40  PEEP: 8  ITime: 0.8  MAP: 9  PIP: 22                                          MEDICATIONS  (STANDING):  albuterol/ipratropium for Nebulization 3 milliLiter(s) Nebulizer every 6 hours  aMIOdarone    Tablet 200 milliGRAM(s) Oral daily  aMIOdarone    Tablet   Oral   aspirin  chewable 81 milliGRAM(s) Oral daily  atorvastatin 80 milliGRAM(s) Oral at bedtime  buMETAnide 2 milliGRAM(s) Oral daily  chlorhexidine 0.12% Liquid 15 milliLiter(s) Oral Mucosa two times a day  chlorhexidine 2% Cloths 1 Application(s) Topical <User Schedule>  darbepoetin Injectable Syringe 40 MICROGram(s) IV Push <User Schedule>  dextrose 5%. 1000 milliLiter(s) (100 mL/Hr) IV Continuous <Continuous>  dextrose 5%. 1000 milliLiter(s) (50 mL/Hr) IV Continuous <Continuous>  dextrose 50% Injectable 25 Gram(s) IV Push once  dextrose 50% Injectable 25 Gram(s) IV Push once  dextrose 50% Injectable 12.5 Gram(s) IV Push once  glucagon  Injectable 1 milliGRAM(s) IntraMuscular once  heparin   Injectable 5000 Unit(s) SubCutaneous every 12 hours  insulin lispro (ADMELOG) corrective regimen sliding scale   SubCutaneous three times a day before meals  levETIRAcetam  Solution 500 milliGRAM(s) Enteral Tube two times a day  pantoprazole    Tablet 40 milliGRAM(s) Oral two times a day  polyethylene glycol 3350 17 Gram(s) Oral daily  sodium zirconium cyclosilicate 5 Gram(s) Oral daily    MEDICATIONS  (PRN):  acetaminophen     Tablet .. 650 milliGRAM(s) Oral every 6 hours PRN Temp greater or equal to 38C (100.4F), Mild Pain (1 - 3)  dextrose Oral Gel 15 Gram(s) Oral once PRN Blood Glucose LESS THAN 70 milliGRAM(s)/deciliter  sodium chloride 0.9% Bolus. 100 milliLiter(s) IV Bolus every 5 minutes PRN SBP LESS THAN or EQUAL to 90 mmHg  sodium chloride 0.9% Bolus. 100 milliLiter(s) IV Bolus every 5 minutes PRN SBP LESS THAN or EQUAL to 80 mmHg

## 2023-07-15 NOTE — PROGRESS NOTE ADULT - SUBJECTIVE AND OBJECTIVE BOX
S: No new evtns/complaints      All other pertinent ROS negative.      07-14-23 @ 07:01  -  07-15-23 @ 07:00  --------------------------------------------------------  IN: 1305 mL / OUT: 0 mL / NET: 1305 mL    07-15-23 @ 07:01  -  07-15-23 @ 19:25  --------------------------------------------------------  IN: 0 mL / OUT: 1500 mL / NET: -1500 mL      Vital Signs Last 24 Hrs  T(C): 36.7 (15 Jul 2023 19:19), Max: 37.2 (15 Jul 2023 05:00)  T(F): 98 (15 Jul 2023 19:19), Max: 99 (15 Jul 2023 05:00)  HR: 72 (15 Jul 2023 19:19) (72 - 83)  BP: 108/62 (15 Jul 2023 19:19) (87/54 - 143/63)  BP(mean): --  RR: 18 (15 Jul 2023 19:19) (18 - 18)  SpO2: 100% (15 Jul 2023 19:19) (100% - 100%)    Parameters below as of 15 Jul 2023 13:01  Patient On (Oxygen Delivery Method): ventilator      PHYSICAL EXAM:    Constitutional: trach/peg. eyes closed. no response to noxious stimuli.  HEENT: PERR, EOMI, Normal Hearing, MMM  Neck: Soft and supple, No LAD, No JVD  Respiratory: Breath sounds are clear bilaterally, No wheezing, rales or rhonchi  Cardiovascular: S1 and S2, regular rate and rhythm, no Murmurs, gallops or rubs  Gastrointestinal: Bowel Sounds present, soft, nontender, nondistended, no guarding, no rebound  Extremities: No peripheral edema      MEDICATIONS:  MEDICATIONS  (STANDING):  albuterol/ipratropium for Nebulization 3 milliLiter(s) Nebulizer every 6 hours  aMIOdarone    Tablet 200 milliGRAM(s) Oral daily  aMIOdarone    Tablet   Oral   aspirin  chewable 81 milliGRAM(s) Oral daily  atorvastatin 80 milliGRAM(s) Oral at bedtime  buMETAnide 2 milliGRAM(s) Oral daily  chlorhexidine 0.12% Liquid 15 milliLiter(s) Oral Mucosa two times a day  chlorhexidine 2% Cloths 1 Application(s) Topical <User Schedule>  darbepoetin Injectable Syringe 40 MICROGram(s) IV Push <User Schedule>  dextrose 5%. 1000 milliLiter(s) (100 mL/Hr) IV Continuous <Continuous>  dextrose 5%. 1000 milliLiter(s) (50 mL/Hr) IV Continuous <Continuous>  dextrose 50% Injectable 25 Gram(s) IV Push once  dextrose 50% Injectable 25 Gram(s) IV Push once  dextrose 50% Injectable 12.5 Gram(s) IV Push once  glucagon  Injectable 1 milliGRAM(s) IntraMuscular once  heparin   Injectable 5000 Unit(s) SubCutaneous every 12 hours  insulin lispro (ADMELOG) corrective regimen sliding scale   SubCutaneous three times a day before meals  levETIRAcetam  Solution 500 milliGRAM(s) Enteral Tube two times a day  pantoprazole    Tablet 40 milliGRAM(s) Oral two times a day  polyethylene glycol 3350 17 Gram(s) Oral daily  sodium zirconium cyclosilicate 5 Gram(s) Oral daily      LABS: All Labs Reviewed:                        7.2    10.65 )-----------( 329      ( 14 Jul 2023 07:22 )             23.2     07-14    134<L>  |  98  |  68<HH>  ----------------------------<  137<H>  5.5<H>   |  27  |  2.8<H>    Ca    9.2      14 Jul 2023 07:22  Phos  2.0     07-14  Mg     2.2     07-14            Blood Culture:     Radiology: reviewed

## 2023-07-15 NOTE — PROGRESS NOTE ADULT - SUBJECTIVE AND OBJECTIVE BOX
seen and examined  24 h events noted   trach/vent         PAST HISTORY  --------------------------------------------------------------------------------  No significant changes to PMH, PSH, FHx, SHx, unless otherwise noted    ALLERGIES & MEDICATIONS  --------------------------------------------------------------------------------  Allergies    penicillin (Other)    Intolerances      Standing Inpatient Medications  albuterol/ipratropium for Nebulization 3 milliLiter(s) Nebulizer every 6 hours  aMIOdarone    Tablet   Oral   aMIOdarone    Tablet 200 milliGRAM(s) Oral daily  aspirin  chewable 81 milliGRAM(s) Oral daily  atorvastatin 80 milliGRAM(s) Oral at bedtime  buMETAnide 2 milliGRAM(s) Oral daily  chlorhexidine 0.12% Liquid 15 milliLiter(s) Oral Mucosa two times a day  chlorhexidine 2% Cloths 1 Application(s) Topical <User Schedule>  darbepoetin Injectable Syringe 40 MICROGram(s) IV Push <User Schedule>  dextrose 5%. 1000 milliLiter(s) IV Continuous <Continuous>  dextrose 5%. 1000 milliLiter(s) IV Continuous <Continuous>  dextrose 50% Injectable 25 Gram(s) IV Push once  dextrose 50% Injectable 25 Gram(s) IV Push once  dextrose 50% Injectable 12.5 Gram(s) IV Push once  glucagon  Injectable 1 milliGRAM(s) IntraMuscular once  heparin   Injectable 5000 Unit(s) SubCutaneous every 12 hours  insulin lispro (ADMELOG) corrective regimen sliding scale   SubCutaneous three times a day before meals  levETIRAcetam  Solution 500 milliGRAM(s) Enteral Tube two times a day  pantoprazole    Tablet 40 milliGRAM(s) Oral two times a day  polyethylene glycol 3350 17 Gram(s) Oral daily  sodium zirconium cyclosilicate 5 Gram(s) Oral daily    PRN Inpatient Medications  acetaminophen     Tablet .. 650 milliGRAM(s) Oral every 6 hours PRN  dextrose Oral Gel 15 Gram(s) Oral once PRN  sodium chloride 0.9% Bolus. 100 milliLiter(s) IV Bolus every 5 minutes PRN  sodium chloride 0.9% Bolus. 100 milliLiter(s) IV Bolus every 5 minutes PRN          VITALS/PHYSICAL EXAM  --------------------------------------------------------------------------------  T(C): 37.2 (07-15-23 @ 05:00), Max: 37.3 (07-14-23 @ 13:00)  HR: 73 (07-15-23 @ 05:00) (73 - 85)  BP: 143/63 (07-15-23 @ 05:00) (117/57 - 143/63)  RR: 18 (07-15-23 @ 05:50) (18 - 18)  SpO2: 100% (07-15-23 @ 05:50) (100% - 100%)  Wt(kg): --        07-14-23 @ 07:01  -  07-15-23 @ 07:00  --------------------------------------------------------  IN: 1305 mL / OUT: 0 mL / NET: 1305 mL      Physical Exam:  	Gen: trach/vent   	Pulm:  B/L aline   	CV:  S1S2; no rub  	Abd: +distended  	LE:  edema  	Vascular access:    LABS/STUDIES  --------------------------------------------------------------------------------              7.2    10.65 >-----------<  329      [07-14-23 @ 07:22]              23.2     134  |  98  |  68  ----------------------------<  137      [07-14-23 @ 07:22]  5.5   |  27  |  2.8        Ca     9.2     [07-14-23 @ 07:22]      Mg     2.2     [07-14-23 @ 07:22]      Phos  2.0     [07-14-23 @ 07:22]      Creatinine Trend:  SCr 2.8 [07-14 @ 07:22]  SCr 2.5 [07-13 @ 09:18]  SCr 2.1 [07-12 @ 08:59]  SCr 1.8 [07-11 @ 16:58]  SCr 2.6 [07-10 @ 06:15]    Urinalysis - [07-14-23 @ 07:22]      Color  / Appearance  / SG  / pH       Gluc 137 / Ketone   / Bili  / Urobili        Blood  / Protein  / Leuk Est  / Nitrite       RBC  / WBC  / Hyaline  / Gran  / Sq Epi  / Non Sq Epi  / Bacteria       Iron 51, TIBC 111, %sat 46      [06-06-23 @ 10:40]  Ferritin 1956      [06-06-23 @ 10:40]  Vitamin D (25OH) 70      [06-03-23 @ 06:50]    HBsAg Nonreact      [07-11-23 @ 16:58]  HCV 0.16, Nonreact      [07-11-23 @ 16:58]

## 2023-07-15 NOTE — PROGRESS NOTE ADULT - ASSESSMENT
62 YO F  PMH of ICH (2021) s/p trach and PEG, HTN, HLD, T2DM, CAD s/p stents, Recurrent C diff, BIBEMS from [facility] for abnormal labs.     MICHELLE on CKD 3a - severe azotemia  likely due to GIB / hypotension  Started HD on 6/1/23  Acute anemia d/t UGIB  Troponemia  Lactic acidosis resolved   DKA resolved   Afib   -  HD today standard bath uf 2 liters as tolerated   -  bumex 2 q 12 / document UO  / check bladder scan   -  last phos 2 improving   -  h/h noted / tx if h <7 / sat elevated / PIYUSH  25 with HD / increased to 40   - BP controlled   - lokelma on non dialysis days   - very poor prognosis   will follow

## 2023-07-15 NOTE — PROGRESS NOTE ADULT - ASSESSMENT
64 y/o Male  with  PMH of ICH (2021) s/p trach and PEG, HTN, HLD, T2DM, CAD s/p stents, Recurrent C diff, BIBEMS from [facility] for abnormal labs. Patient non-verbal at baseline . Per NH chart  have a BUN greater than 200 and creatinine of 4.3 on outpatient labs. Outpatient CXR also w/ new L sided pleural effusion. In ED, patient was hypotensive with Afib with RVR, found to be in acute renal failure, anemic with elevated trops. He was started on Amiodarone GTT, PPI GTT, gastric lavage with coffee ground secretions, started on broad spectrum abx and admitted to MICU  While in MICU, course complicated by DKA, s/p insulin drip, MICHELLE started on HD, acute anemia s/p PRBC transfusion, NSTEMI, downgraded to SDU -> floor than downgraded to Vent Unit.    # Acute Renal Failure, started on HD 6/1, as per Renal team rec.   - daily BMP monitoring, daily weight, daily strict I/O chart  - patient is hypotensive- Bumex transitioned to PO 2 mg once daily to avoid symptomatic hypotension  - s/p permanent HD cath. placement on 7/7/23  - Midodrine prn. tx. prior to HD if b/p is borderline low    # anemia of chronic disease - no gross bleeding events  - 7/7- s/p 1 unit PRBC, 7/10 hg 7.7    # Hyperkalemia- corrected     # Hypotension- d/c Cardizem, continue on Bumex, am serum Cortisol level- 12.3       # chronic respiratory failure, vent dependant via trach , trach care,  Vent management per Pulm. team   # Chronic dysphagia, sp peg, peg care   #Candiduria with U cx C tropicalis  # Afib w/ RVR - now rate controlled # Acute Decompensated CHF  CAD s/p stents  NSTEMI, medical management   # DKA, resolved  # Hx ICH   Bedbound from NH   - c/w keppra 500mg BID for seizure prophylaxis  # multiple sacral wounds. sacral and buttock , further wound care as per Wound care specialist report, frequent body position change, decub. prevention tx.     # hx of ICH, chronic non-verbal , quadriplegia - continue daily care.     # h/o DM 2- continue bsfs monitoring  with insulin sliding scale co.    DVT proph: Heparin subc. tx.     #Progress Note Handoff:  Patient remains with very poor overall prognosis , pending  candida auris swab cxs for placement, HD tx. as per renal team, monitor h/h   Family discussion: yes, medical team Disposition: SNF , once candida auris  swab results will be available .

## 2023-07-16 NOTE — PROGRESS NOTE ADULT - SUBJECTIVE AND OBJECTIVE BOX
ALICIA BARBOUR 63y Male  MRN#: [id number]     Hospital Day: 47d    Pt is currently admitted with the primary diagnosis of     Overnight events   -No major overnight events                                          ----------------------------------------------------------  OBJECTIVE  PAST MEDICAL & SURGICAL HISTORY  HTN (hypertension)    Diabetes mellitus    H/O intracranial hemorrhage    H/O tracheostomy    PEG (percutaneous endoscopic gastrostomy) status    Hyperlipidemia                                              -----------------------------------------------------------  MEDICATIONS:  STANDING MEDICATIONS  albuterol/ipratropium for Nebulization 3 milliLiter(s) Nebulizer every 6 hours  aMIOdarone    Tablet 200 milliGRAM(s) Oral daily  aMIOdarone    Tablet   Oral   aspirin  chewable 81 milliGRAM(s) Oral daily  atorvastatin 80 milliGRAM(s) Oral at bedtime  buMETAnide 2 milliGRAM(s) Oral daily  chlorhexidine 0.12% Liquid 15 milliLiter(s) Oral Mucosa two times a day  chlorhexidine 2% Cloths 1 Application(s) Topical <User Schedule>  darbepoetin Injectable Syringe 40 MICROGram(s) IV Push <User Schedule>  dextrose 5%. 1000 milliLiter(s) IV Continuous <Continuous>  dextrose 5%. 1000 milliLiter(s) IV Continuous <Continuous>  dextrose 50% Injectable 12.5 Gram(s) IV Push once  dextrose 50% Injectable 25 Gram(s) IV Push once  dextrose 50% Injectable 25 Gram(s) IV Push once  glucagon  Injectable 1 milliGRAM(s) IntraMuscular once  heparin   Injectable 5000 Unit(s) SubCutaneous every 12 hours  insulin lispro (ADMELOG) corrective regimen sliding scale   SubCutaneous three times a day before meals  levETIRAcetam  Solution 500 milliGRAM(s) Enteral Tube two times a day  pantoprazole    Tablet 40 milliGRAM(s) Oral two times a day  polyethylene glycol 3350 17 Gram(s) Oral daily  sodium zirconium cyclosilicate 5 Gram(s) Oral daily    PRN MEDICATIONS  acetaminophen     Tablet .. 650 milliGRAM(s) Oral every 6 hours PRN  dextrose Oral Gel 15 Gram(s) Oral once PRN  sodium chloride 0.9% Bolus. 100 milliLiter(s) IV Bolus every 5 minutes PRN  sodium chloride 0.9% Bolus. 100 milliLiter(s) IV Bolus every 5 minutes PRN                                            ------------------------------------------------------------  VITAL SIGNS: Last 24 Hours  T(C): 37 (16 Jul 2023 14:00), Max: 37 (16 Jul 2023 14:00)  T(F): 98.6 (16 Jul 2023 14:00), Max: 98.6 (16 Jul 2023 14:00)  HR: 81 (16 Jul 2023 14:00) (72 - 83)  BP: 112/61 (16 Jul 2023 14:00) (108/62 - 116/63)  BP(mean): --  RR: 14 (16 Jul 2023 14:00) (14 - 18)  SpO2: 100% (16 Jul 2023 14:00) (100% - 100%)      07-15-23 @ 07:01  -  07-16-23 @ 07:00  --------------------------------------------------------  IN: 435 mL / OUT: 1500 mL / NET: -1065 mL                                             --------------------------------------------------------------  LABS:                        7.0    10.86 )-----------( 287      ( 16 Jul 2023 08:47 )             22.9     07-16    135  |  99  |  55<H>  ----------------------------<  149<H>  4.9   |  25  |  2.3<H>    Ca    8.9      16 Jul 2023 08:47  Phos  2.0     07-16  Mg     2.0     07-16        Urinalysis Basic - ( 16 Jul 2023 08:47 )    Color: x / Appearance: x / SG: x / pH: x  Gluc: 149 mg/dL / Ketone: x  / Bili: x / Urobili: x   Blood: x / Protein: x / Nitrite: x   Leuk Esterase: x / RBC: x / WBC x   Sq Epi: x / Non Sq Epi: x / Bacteria: x                                                            --------------------------------------------------------------  PHYSICAL EXAM:  defered    PLAN:    # DVT prophylaxis     # GI prophylaxis     # Diet     # Activity Score (AM-PAC)    #Progress Note Handoff  Pending (specify):  Family discussion:   Disposition:

## 2023-07-16 NOTE — PROGRESS NOTE ADULT - SUBJECTIVE AND OBJECTIVE BOX
Over Night Events: events noted, vent dependant, afebrile    PHYSICAL EXAM    ICU Vital Signs Last 24 Hrs  T(C): 36.9 (16 Jul 2023 05:55), Max: 36.9 (15 Jul 2023 13:01)  T(F): 98.4 (16 Jul 2023 05:55), Max: 98.4 (15 Jul 2023 13:01)  HR: 83 (16 Jul 2023 07:05) (72 - 83)  BP: 116/63 (16 Jul 2023 05:55) (87/54 - 116/63)  RR: 18 (16 Jul 2023 05:55) (18 - 18)  SpO2: 100% (16 Jul 2023 07:05) (100% - 100%)    O2 Parameters below as of 16 Jul 2023 05:55  Patient On (Oxygen Delivery Method): ventilator            General: ill looking  HEENT: trach  Lungs: dec both bases  Cardiovascular: Regular   Abdomen: Soft, Positive BS  unresponsive      07-15-23 @ 07:01  -  07-16-23 @ 07:00  --------------------------------------------------------  IN:    Enteral Tube Flush: 60 mL    Peptamen A.F.: 375 mL  Total IN: 435 mL    OUT:    Other (mL): 1500 mL  Total OUT: 1500 mL    Total NET: -1065 mL          LABS:                          7.0    10.86 )-----------( 287      ( 16 Jul 2023 08:47 )             22.9                                               07-16    135  |  99  |  55<H>  ----------------------------<  149<H>  4.9   |  25  |  2.3<H>    Ca    8.9      16 Jul 2023 08:47  Phos  2.0     07-16  Mg     2.0     07-16                                               Urinalysis Basic - ( 16 Jul 2023 08:47 )    Color: x / Appearance: x / SG: x / pH: x  Gluc: 149 mg/dL / Ketone: x  / Bili: x / Urobili: x   Blood: x / Protein: x / Nitrite: x   Leuk Esterase: x / RBC: x / WBC x   Sq Epi: x / Non Sq Epi: x / Bacteria: x                                                                                                                                        Mode: AC/ CMV (Assist Control/ Continuous Mandatory Ventilation)  RR (machine): 14  TV (machine): 450  FiO2: 40  PEEP: 8  ITime: 0.8  MAP: 12  PIP: 30                                          MEDICATIONS  (STANDING):  albuterol/ipratropium for Nebulization 3 milliLiter(s) Nebulizer every 6 hours  aMIOdarone    Tablet 200 milliGRAM(s) Oral daily  aMIOdarone    Tablet   Oral   aspirin  chewable 81 milliGRAM(s) Oral daily  atorvastatin 80 milliGRAM(s) Oral at bedtime  buMETAnide 2 milliGRAM(s) Oral daily  chlorhexidine 0.12% Liquid 15 milliLiter(s) Oral Mucosa two times a day  chlorhexidine 2% Cloths 1 Application(s) Topical <User Schedule>  darbepoetin Injectable Syringe 40 MICROGram(s) IV Push <User Schedule>  dextrose 5%. 1000 milliLiter(s) (50 mL/Hr) IV Continuous <Continuous>  dextrose 5%. 1000 milliLiter(s) (100 mL/Hr) IV Continuous <Continuous>  dextrose 50% Injectable 25 Gram(s) IV Push once  dextrose 50% Injectable 25 Gram(s) IV Push once  dextrose 50% Injectable 12.5 Gram(s) IV Push once  glucagon  Injectable 1 milliGRAM(s) IntraMuscular once  heparin   Injectable 5000 Unit(s) SubCutaneous every 12 hours  insulin lispro (ADMELOG) corrective regimen sliding scale   SubCutaneous three times a day before meals  levETIRAcetam  Solution 500 milliGRAM(s) Enteral Tube two times a day  pantoprazole    Tablet 40 milliGRAM(s) Oral two times a day  polyethylene glycol 3350 17 Gram(s) Oral daily  sodium zirconium cyclosilicate 5 Gram(s) Oral daily    MEDICATIONS  (PRN):  acetaminophen     Tablet .. 650 milliGRAM(s) Oral every 6 hours PRN Temp greater or equal to 38C (100.4F), Mild Pain (1 - 3)  dextrose Oral Gel 15 Gram(s) Oral once PRN Blood Glucose LESS THAN 70 milliGRAM(s)/deciliter  sodium chloride 0.9% Bolus. 100 milliLiter(s) IV Bolus every 5 minutes PRN SBP LESS THAN or EQUAL to 80 mmHg  sodium chloride 0.9% Bolus. 100 milliLiter(s) IV Bolus every 5 minutes PRN SBP LESS THAN or EQUAL to 90 mmHg

## 2023-07-16 NOTE — PROGRESS NOTE ADULT - SUBJECTIVE AND OBJECTIVE BOX
S: No new events/complaints      All other pertinent ROS negative.      07-15-23 @ 07:01  -  07-16-23 @ 07:00  --------------------------------------------------------  IN: 435 mL / OUT: 1500 mL / NET: -1065 mL      Vital Signs Last 24 Hrs  T(C): 36.9 (16 Jul 2023 05:55), Max: 36.9 (16 Jul 2023 05:55)  T(F): 98.4 (16 Jul 2023 05:55), Max: 98.4 (16 Jul 2023 05:55)  HR: 83 (16 Jul 2023 07:05) (72 - 83)  BP: 116/63 (16 Jul 2023 05:55) (108/62 - 116/63)  BP(mean): --  RR: 18 (16 Jul 2023 05:55) (18 - 18)  SpO2: 100% (16 Jul 2023 07:05) (100% - 100%)    Parameters below as of 16 Jul 2023 05:55  Patient On (Oxygen Delivery Method): ventilator      PHYSICAL EXAM:    Constitutional: trach/peg/vent. critically ill. not oppening eyes.   HEENT: PERR, EOMI, Normal Hearing, MMM  Neck: Soft and supple, No LAD, No JVD  Respiratory: Breath sounds are clear bilaterally, No wheezing, rales or rhonchi  Cardiovascular: S1 and S2, regular rate and rhythm, no Murmurs, gallops or rubs  Gastrointestinal: Bowel Sounds present, soft, nontender, nondistended, no guarding, no rebound  Extremities: No peripheral edema      MEDICATIONS:  MEDICATIONS  (STANDING):  albuterol/ipratropium for Nebulization 3 milliLiter(s) Nebulizer every 6 hours  aMIOdarone    Tablet 200 milliGRAM(s) Oral daily  aMIOdarone    Tablet   Oral   aspirin  chewable 81 milliGRAM(s) Oral daily  atorvastatin 80 milliGRAM(s) Oral at bedtime  buMETAnide 2 milliGRAM(s) Oral daily  chlorhexidine 0.12% Liquid 15 milliLiter(s) Oral Mucosa two times a day  chlorhexidine 2% Cloths 1 Application(s) Topical <User Schedule>  darbepoetin Injectable Syringe 40 MICROGram(s) IV Push <User Schedule>  dextrose 5%. 1000 milliLiter(s) (100 mL/Hr) IV Continuous <Continuous>  dextrose 5%. 1000 milliLiter(s) (50 mL/Hr) IV Continuous <Continuous>  dextrose 50% Injectable 25 Gram(s) IV Push once  dextrose 50% Injectable 25 Gram(s) IV Push once  dextrose 50% Injectable 12.5 Gram(s) IV Push once  glucagon  Injectable 1 milliGRAM(s) IntraMuscular once  heparin   Injectable 5000 Unit(s) SubCutaneous every 12 hours  insulin lispro (ADMELOG) corrective regimen sliding scale   SubCutaneous three times a day before meals  levETIRAcetam  Solution 500 milliGRAM(s) Enteral Tube two times a day  pantoprazole    Tablet 40 milliGRAM(s) Oral two times a day  polyethylene glycol 3350 17 Gram(s) Oral daily  sodium zirconium cyclosilicate 5 Gram(s) Oral daily      LABS: All Labs Reviewed:                        7.0    10.86 )-----------( 287      ( 16 Jul 2023 08:47 )             22.9     07-16    135  |  99  |  55<H>  ----------------------------<  149<H>  4.9   |  25  |  2.3<H>    Ca    8.9      16 Jul 2023 08:47  Phos  2.0     07-16  Mg     2.0     07-16            Blood Culture:     Radiology: reviewed

## 2023-07-16 NOTE — PROGRESS NOTE ADULT - ASSESSMENT
64 YO F  PMH of ICH (2021) s/p trach and PEG, HTN, HLD, T2DM, CAD s/p stents, Recurrent C diff, BIBEMS from [facility] for abnormal labs.     MICHELLE on CKD 3a - severe azotemia  likely due to GIB / hypotension  Started HD on 6/1/23  Acute anemia d/t UGIB  Troponemia  Lactic acidosis resolved   DKA resolved   Afib   -  HD yesterday, next liekly tuesday   -  bumex 2 q 12 / document UO  / check bladder scan   -  last phos 2 improving   -  h/h noted / tx if h <7 / sat elevated / PIYUSH  25 with HD / increased to 40   - BP controlled   - lokelma on non dialysis days   - very poor prognosis   will follow

## 2023-07-16 NOTE — PROGRESS NOTE ADULT - SUBJECTIVE AND OBJECTIVE BOX
Nephrology progress note    Patient was seen and examined, events over the last 24 h noted .    Allergies:  penicillin (Other)    Hospital Medications:   MEDICATIONS  (STANDING):  albuterol/ipratropium for Nebulization 3 milliLiter(s) Nebulizer every 6 hours  aMIOdarone    Tablet   Oral   aMIOdarone    Tablet 200 milliGRAM(s) Oral daily  aspirin  chewable 81 milliGRAM(s) Oral daily  atorvastatin 80 milliGRAM(s) Oral at bedtime  buMETAnide 2 milliGRAM(s) Oral daily  chlorhexidine 0.12% Liquid 15 milliLiter(s) Oral Mucosa two times a day  chlorhexidine 2% Cloths 1 Application(s) Topical <User Schedule>  darbepoetin Injectable Syringe 40 MICROGram(s) IV Push <User Schedule>  dextrose 5%. 1000 milliLiter(s) (100 mL/Hr) IV Continuous <Continuous>  dextrose 5%. 1000 milliLiter(s) (50 mL/Hr) IV Continuous <Continuous>  dextrose 50% Injectable 25 Gram(s) IV Push once  dextrose 50% Injectable 25 Gram(s) IV Push once  dextrose 50% Injectable 12.5 Gram(s) IV Push once  glucagon  Injectable 1 milliGRAM(s) IntraMuscular once  heparin   Injectable 5000 Unit(s) SubCutaneous every 12 hours  insulin lispro (ADMELOG) corrective regimen sliding scale   SubCutaneous three times a day before meals  levETIRAcetam  Solution 500 milliGRAM(s) Enteral Tube two times a day  pantoprazole    Tablet 40 milliGRAM(s) Oral two times a day  polyethylene glycol 3350 17 Gram(s) Oral daily  sodium zirconium cyclosilicate 5 Gram(s) Oral daily        VITALS:  T(F): 98.6 (07-16-23 @ 14:00), Max: 98.6 (07-16-23 @ 14:00)  HR: 81 (07-16-23 @ 14:00)  BP: 112/61 (07-16-23 @ 14:00)  RR: 14 (07-16-23 @ 14:00)  SpO2: 100% (07-16-23 @ 14:00)  Wt(kg): --    07-14 @ 07:01  -  07-15 @ 07:00  --------------------------------------------------------  IN: 1305 mL / OUT: 0 mL / NET: 1305 mL    07-15 @ 07:01  -  07-16 @ 07:00  --------------------------------------------------------  IN: 435 mL / OUT: 1500 mL / NET: -1065 mL        Weight (kg): 89.2 (07-16 @ 07:39)    PHYSICAL EXAM:  	Gen: trach/vent   	Pulm:  B/L aline   	CV:  S1S2; no rub  	Abd: +distended  	LE:  edema  	Vascular access:  LABS:  07-16    135  |  99  |  55<H>  ----------------------------<  149<H>  4.9   |  25  |  2.3<H>    Ca    8.9      16 Jul 2023 08:47  Phos  2.0     07-16  Mg     2.0     07-16                            7.0    10.86 )-----------( 287      ( 16 Jul 2023 08:47 )             22.9       Urine Studies:  Urinalysis Basic - ( 16 Jul 2023 08:47 )    Color:  / Appearance:  / SG:  / pH:   Gluc: 149 mg/dL / Ketone:   / Bili:  / Urobili:    Blood:  / Protein:  / Nitrite:    Leuk Esterase:  / RBC:  / WBC    Sq Epi:  / Non Sq Epi:  / Bacteria:         RADIOLOGY & ADDITIONAL STUDIES:

## 2023-07-16 NOTE — PROGRESS NOTE ADULT - ASSESSMENT
64 y/o Male  with  PMH of ICH (2021) s/p trach and PEG, HTN, HLD, T2DM, CAD s/p stents, Recurrent C diff, BIBEMS from [facility] for abnormal labs. Patient non-verbal at baseline . Per NH chart  have a BUN greater than 200 and creatinine of 4.3 on outpatient labs. Outpatient CXR also w/ new L sided pleural effusion. In ED, patient was hypotensive with Afib with RVR, found to be in acute renal failure, anemic with elevated trops. He was started on Amiodarone GTT, PPI GTT, gastric lavage with coffee ground secretions, started on broad spectrum abx and admitted to MICU  While in MICU, course complicated by DKA, s/p insulin drip, MICHELLE started on HD, acute anemia s/p PRBC transfusion, NSTEMI, downgraded to SDU -> floor than downgraded to Vent Unit.    # Acute Renal Failure, started on HD 6/1, as per Renal team rec.   - daily BMP monitoring, daily weight, daily strict I/O chart  - patient is hypotensive- Bumex transitioned to PO 2 mg once daily to avoid symptomatic hypotension  - s/p permanent HD cath. placement on 7/7/23  - Midodrine prn. tx. prior to HD if b/p is borderline low    # anemia of chronic disease - no gross bleeding events  - 7/7- s/p 1 unit PRBC, 7/10 hg 7.7    # Hyperkalemia- corrected     # Hypotension- d/c Cardizem, continue on Bumex, am serum Cortisol level- 12.3       # chronic respiratory failure, vent dependant via trach , trach care,  Vent management per Pulm. team   # Chronic dysphagia, sp peg, peg care   #Candiduria with U cx C tropicalis  # Afib w/ RVR - now rate controlled # Acute Decompensated CHF  CAD s/p stents  NSTEMI, medical management   # DKA, resolved  # Hx ICH   Bedbound from NH   - c/w keppra 500mg BID for seizure prophylaxis  # multiple sacral wounds. sacral and buttock , further wound care as per Wound care specialist report, frequent body position change, decub. prevention tx.     # hx of ICH, chronic non-verbal , quadriplegia - continue daily care.     # h/o DM 2- continue bsfs monitoring  with insulin sliding scale co.    DVT proph: Heparin subc. tx.     #Progress Note Handoff:  Patient remains with very poor overall prognosis , pending  candida auris swab cxs for placement, HD tx. as per renal team, monitor h/h   Family discussion: yes, medical team Disposition: SNF , once candida auris  swab results will be available .

## 2023-07-16 NOTE — PROGRESS NOTE ADULT - ASSESSMENT
IMPRESSION:    Sepsis present on admission resolved  Candida UTI sp tx  Pseudomonas PNA sp abx  anemia  left side atelectasis improved  Bilateral pleural effusions likely volume overload   MICHELLE on CKD III on dialysis   NSTEMI likely type 2  Hyponatremia resolved   Paroxysmal Afib  Acute on chronic anemia suspected UGI Bleed  Chronic respiratory failure  H/o ICH s/p trach and PEG  H/o CAD      PLAN:    CNS: Avoid CNS depressants     HEENT: Oral and trach care    PULMONARY: HOB 45. Aspiration.  No vent changes.  Goal saturation 92-96%. Vent dependent.  not weanable    CARDIOVASCULAR: Avoid overload.      GI:  Feeding  Bowel regimen     RENAL: trend CMP.  Correct as needed.  Nephrology following. HD per renal     INFECTIOUS DISEASE: ABX per ID.      HEMATOLOGICAL: DVT prophylaxis.     ENDOCRINE: Follow up FS. Insulin protocol if needed.    DNR    Poor prognosis  dc planning

## 2023-07-16 NOTE — PROGRESS NOTE ADULT - ASSESSMENT
64 yo M with  PMH of ICH (2021) s/p trach and PEG, HTN, HLD, T2DM, CAD s/p stents, Recurrent C diff, BIBEMS from [facility] for abnormal labs. Patient non-verbal at baseline - limited history. Per NH chart  have a BUN greater than 200 and creatinine of 4.3 on outpatient labs. Outpatient CXR also w/ new L sided pleural effusion. In ED, patient was hypotensive with Afib with RVR, found to be in acure renal failure, anemic with elevated trops. He was started on Amiodarone GTT, PPI GTT, gastric lavage with coffee ground secretions, started on broad spectrum abx and admitted to MICU  While in MICU, course complicated by DKA, s/p insulin drip, MICHELLE started on HD, acute anemia s/p PRBC transfusion, NSTEMI, downgraded to SDU -> floor. pending placement.    # Acute Renal Failure, started on HD 6/1  # Mild Hyperkalemia  # Fluid Overload- Improving- c/w Bumex and HD  # HAGMA- resolved  - HD port created 7/7, received HD 7/7, 7/11  - Nephro on board- likely HD twice a week. Daily BMP, weight, strict I/O  - midodrine 1 hr before HD if SBP <100  - s/p Midodrine - BP better, s/p Sodium Bicarb- acidosis improved  - TTE 6/1 - EF 63%, GIDD   - K+ elevted, off Lokelma   - monitor BMP    # Afib w/ RVR -   # Acute Decompensated CHF  # CAD s/p stents  # NSTEMI type 2  -now rate controlled- c/w Amio and Cardizem  -CHF resolved s/p diuresis  -Seen by cardio 5/31, recommend c/w asa and stating, medical management for NSTEMI    # anemia of chronic disease  - iron WNL  - monitor H/H, keep Hgb > 7  - received multiple blood transfusions  - last transfusion 7/7    # chronic respiratory failure,   vent dependant via trach   trach care    # functional dysphagia,   sp peg   peg care     # Candiduria with U cx C tropicalis  # MDRO UTI and PNA-  s/p Fluconazole  s/p Ceftolozane-tazobactam    # Hyponatremia-resolved    # DKA, resolved  -s/p insulin drip, SSI and tube feeds  Monitor FS     # Diarrhea   GI PCR -ve, C.diff -not detected    cont dignishield     # Hx ICH  # multiple sacral wounds  # functional paraplegia ,   Bedbound from NH  has upper ext contracture total care   c/w keppra 500mg BID for seizure prophylaxis  sacral and buttock wounds , un-stageable  Care instructions per wound care:   Clean sacrum , B/L buttock and upper back wound with vashe wound cleanser, pat dry then apply hydrogel with moist vashe guaze dressing  Q 12 hours   Continue skin barrier  to R heel - monitor progression  Pressure  injury  preventive  measures  Skin  and incontinence care     #Hypotension  Cardizem was discontinued and holding parameters placed on Bumex  AM serum cortisol WNL    #Misc  -DVT prophylaxis: Heparin SQ  -GI prophylaxis: Protonix IV BID  -Diet: Tube feed  -Code status: DNR  -Activity: IAT  -Dispo: pending    #Progress Note Handoff:  Patient remains with very poor overall prognosis , pending  candida auris swab cxs for placement, HD tx. as per renal team, monitor h/h   Family discussion: yes, medical team Disposition: SNF , once candida auris  swab results will be available .

## 2023-07-17 NOTE — PROGRESS NOTE ADULT - SUBJECTIVE AND OBJECTIVE BOX
NUTRITION SUPPORT TEAM  -  PROGRESS NOTE   vented via trach  arms contracted, awake, not interactive  + UE edema  on G tube feed    tolerating enteral feeds,   nephrology f/u noted   +BM  REVIEW OF SYSTEMS:  Negative except as noted above.     VITALS:  T(F): 96.8 (07-17 @ 05:34), Max: 96.8 (07-17 @ 05:34)  HR: 81 (07-17 @ 08:17) (75 - 81)  BP: 138/68 (07-17 @ 05:34) (138/68 - 138/68)  RR: 18 (07-17 @ 05:34) (18 - 18)  SpO2: 100% (07-17 @ 08:45) (100% - 100%)  Mode: AC/ CMV (Assist Control/ Continuous Mandatory Ventilation)  RR (machine): 14  TV (machine): 450  FiO2: 40  PEEP: 8  ITime: 1  MAP: 13  PIP: 33    HEIGHT/WEIGHT/BMI:   Height (cm): 170.2 (06-01), 180.3 (10-04)  Weight (kg): 89.4 (07-17), 66.4 (10-04)  BMI (kg/m2): 30.9 (07-17), 22.9 (06-01), 20.4 (10-04)  07-16-23 @ 07:01  -  07-17-23 @ 07:00  --------------------------------------------------------  IN:    Enteral Tube Flush: 120 mL    Peptamen A.F.: 750 mL  Total IN: 870 mL    OUT:  Total OUT: 0 mL    Total NET: 870 mL  MEDICATIONS:   acetaminophen     Tablet .. 650 milliGRAM(s) Oral every 6 hours PRN  albuterol/ipratropium for Nebulization 3 milliLiter(s) Nebulizer every 6 hours  aMIOdarone    Tablet 200 milliGRAM(s) Oral daily  aMIOdarone    Tablet   Oral   aspirin  chewable 81 milliGRAM(s) Oral daily  atorvastatin 80 milliGRAM(s) Oral at bedtime  buMETAnide 2 milliGRAM(s) Oral daily  chlorhexidine 0.12% Liquid 15 milliLiter(s) Oral Mucosa two times a day  chlorhexidine 2% Cloths 1 Application(s) Topical <User Schedule>  darbepoetin Injectable Syringe 40 MICROGram(s) IV Push <User Schedule>  heparin   Injectable 5000 Unit(s) SubCutaneous every 12 hours  insulin lispro (ADMELOG) corrective regimen sliding scale   SubCutaneous three times a day before meals  levETIRAcetam  Solution 500 milliGRAM(s) Enteral Tube two times a day  pantoprazole    Tablet 40 milliGRAM(s) Oral two times a day  polyethylene glycol 3350 17 Gram(s) Oral daily  sodium chloride 0.9% Bolus. 100 milliLiter(s) IV Bolus every 5 minutes PRN  sodium chloride 0.9% Bolus. 100 milliLiter(s) IV Bolus every 5 minutes PRN  sodium zirconium cyclosilicate 5 Gram(s) Oral daily    LABS:                         6.7    12.59 )-----------( 330      ( 17 Jul 2023 10:38 )             20.8     137  |  98  |  64<HH>  ----------------------------<  135<H>          (07-17-23 @ 10:38)  4.4   |  29  |  2.7<H>    Ca    9.1          (07-17-23 @ 10:38)  Phos  2.1         (07-17-23 @ 10:38)  Mg     2.2         (07-17-23 @ 10:38)    TPro  5.3<L>  /  Alb  2.1<L>  /  TBili  0.3  /  DBili  x   /  AST  14  /  ALT  12  /  AlkPhos  130<H>       07-17-23 @ 10:38  Vitamin D, 25-Hydroxy: 70 ng/mL (06-03 @ 06:50)  Folate: >20.0 ng/mL (06-03 @ 06:50)  Vitamin B12, Serum: 1908 pg/mL (06-06 @ 10:40)  Zinc Level, Plasma: 54 ug/dL (06-03 @ 06:50)  Blood Glucose (Past 24 hours):  128 mg/dL (07-17 @ 11:50)  126 mg/dL (07-17 @ 06:16)  143 mg/dL (07-16 @ 17:18)    DIET:   Diet, NPO with Tube Feed:   Tube Feeding Modality: Gastrostomy  Peptamen A.F. Formula  Total Volume for 24 Hours (mL): 1500  Bolus  Total Volume of Bolus (mL):  375  Tube Feed Frequency: Every 6 hours   Tube Feed Start Time: 16:00  Bolus Feed Rate (mL per Hour): 500   Bolus Feed Duration (in Hours): 0.75  Free Water Flush Instructions:  flush GT with 30 ml water syringe push before & after each feed (06-02-23 @ 15:58) [Active]

## 2023-07-17 NOTE — PROGRESS NOTE ADULT - ASSESSMENT
62 y/o Male  with  PMH of ICH (2021) s/p trach and PEG, HTN, HLD, T2DM, CAD s/p stents, Recurrent C diff, BIBEMS from [facility] for abnormal labs. Patient non-verbal at baseline . Per NH chart  have a BUN greater than 200 and creatinine of 4.3 on outpatient labs. Outpatient CXR also w/ new L sided pleural effusion. In ED, patient was hypotensive with Afib with RVR, found to be in acute renal failure, anemic with elevated trops. He was started on Amiodarone GTT, PPI GTT, gastric lavage with coffee ground secretions, started on broad spectrum abx and admitted to MICU  While in MICU, course complicated by DKA, s/p insulin drip, MICHELLE started on HD, acute anemia s/p PRBC transfusion, NSTEMI, downgraded to SDU -> floor than downgraded to Vent Unit.    # Acute Renal Failure, started on HD 6/1, as per Renal team rec.   - daily BMP monitoring, daily weight, daily strict I/O chart  - patient is hypotensive- Bumex transitioned to PO 2 mg once daily to avoid symptomatic hypotension  - s/p permanent HD cath. placement on 7/7/23  - Midodrine prn. tx. prior to HD if b/p is borderline low    # anemia of chronic disease - no gross bleeding events  - 7/7; 7/17- s/p 1 unit PRBC,   transfuse prn for goal > 7    # Hyperkalemia- corrected     # Hypotension- d/c Cardizem, continue on Bumex, am serum Cortisol level- 12.3       # chronic respiratory failure, vent dependant via trach , trach care,  Vent management per Pulm. team   # Chronic dysphagia, sp peg, peg care   #Candiduria with U cx C tropicalis  # Afib w/ RVR - now rate controlled # Acute Decompensated CHF  CAD s/p stents  NSTEMI, medical management   # DKA, resolved  # Hx ICH   Bedbound from NH   - c/w keppra 500mg BID for seizure prophylaxis  # multiple sacral wounds. sacral and buttock , further wound care as per Wound care specialist report, frequent body position change, decub. prevention tx.     # hx of ICH, chronic non-verbal , quadriplegia - continue daily care.     # h/o DM 2- continue bsfs monitoring  with insulin sliding scale co.    DVT proph: Heparin subc. tx.     #Progress Note Handoff:  Patient remains with very poor overall prognosis , pending  candida auris swab cxs for placement, HD tx. as per renal team, monitor h/h   Family discussion: yes, medical team Disposition: SNF , once candida auris  swab results will be available .

## 2023-07-17 NOTE — PROGRESS NOTE ADULT - ASSESSMENT
ASSESSMENT   Acute Renal Failure, started on HD 6/1   Mild Hyperkalemia   Fluid Overload- Improving- c/w Bumex and HD  -Afib w/ RVR -    Acute Decompensated CHF  CAD s/p stents   NSTEMI type 2  - anemia of chronic disease  - chronic respiratory failure,    functional dysphagia,   s/p peg    Candiduria with U cx C tropicalis   MDRO UTI and PNA-  hyponatremia-resolved   DKA, resolved   Diarrhea    Hx ICH   multiple sacral wounds   functional paraplegia ,   Bedbound from NH  PLAN  treat phos -/ keep phos>3.0  -continue with Peptamen AF      375 ml over 45 min x 4 feeds/d --> 1800 kcal (low % carb) 114 gm protein (whey source), 1200 mg phos, 61.5 mEq K/d  - check poc glucose prior to each feeding  - f/u phos with pre-HD labs  - limit pre and post feed flushes to 30 ml

## 2023-07-17 NOTE — PROGRESS NOTE ADULT - SUBJECTIVE AND OBJECTIVE BOX
S: No new wevents/complaints      All other pertinent ROS negative.      07-16-23 @ 07:01  -  07-17-23 @ 07:00  --------------------------------------------------------  IN: 870 mL / OUT: 0 mL / NET: 870 mL    07-17-23 @ 07:01  - 07-17-23 @ 18:46  --------------------------------------------------------  IN: 774 mL / OUT: 0 mL / NET: 774 mL      Vital Signs Last 24 Hrs  T(C): 36.4 (17 Jul 2023 16:41), Max: 37 (16 Jul 2023 20:19)  T(F): 97.6 (17 Jul 2023 16:41), Max: 98.6 (16 Jul 2023 20:19)  HR: 78 (17 Jul 2023 16:41) (75 - 85)  BP: 98/56 (17 Jul 2023 16:41) (98/56 - 138/68)  BP(mean): --  RR: 14 (17 Jul 2023 16:41) (14 - 18)  SpO2: 100% (17 Jul 2023 16:41) (99% - 100%)    Parameters below as of 17 Jul 2023 16:41  Patient On (Oxygen Delivery Method): ventilator      PHYSICAL EXAM:    Constitutional: vent/ trach/peg/eyes closed  HEENT: PERR, EOMI, Normal Hearing, MMM  Neck: Soft and supple, No LAD, No JVD  Respiratory: Breath sounds are clear bilaterally, No wheezing, rales or rhonchi  Cardiovascular: S1 and S2, regular rate and rhythm, no Murmurs, gallops or rubs  Gastrointestinal: Bowel Sounds present, soft, nontender, nondistended, no guarding, no rebound  Extremities:anasarca    MEDICATIONS:  MEDICATIONS  (STANDING):  albuterol/ipratropium for Nebulization 3 milliLiter(s) Nebulizer every 6 hours  aMIOdarone    Tablet 200 milliGRAM(s) Oral daily  aMIOdarone    Tablet   Oral   aspirin  chewable 81 milliGRAM(s) Oral daily  atorvastatin 80 milliGRAM(s) Oral at bedtime  buMETAnide 2 milliGRAM(s) Oral daily  chlorhexidine 0.12% Liquid 15 milliLiter(s) Oral Mucosa two times a day  chlorhexidine 2% Cloths 1 Application(s) Topical <User Schedule>  darbepoetin Injectable Syringe 40 MICROGram(s) IV Push <User Schedule>  dextrose 5%. 1000 milliLiter(s) (100 mL/Hr) IV Continuous <Continuous>  dextrose 5%. 1000 milliLiter(s) (50 mL/Hr) IV Continuous <Continuous>  dextrose 50% Injectable 25 Gram(s) IV Push once  dextrose 50% Injectable 25 Gram(s) IV Push once  dextrose 50% Injectable 12.5 Gram(s) IV Push once  glucagon  Injectable 1 milliGRAM(s) IntraMuscular once  heparin   Injectable 5000 Unit(s) SubCutaneous every 12 hours  insulin lispro (ADMELOG) corrective regimen sliding scale   SubCutaneous three times a day before meals  levETIRAcetam  Solution 500 milliGRAM(s) Enteral Tube two times a day  pantoprazole   Suspension 40 milliGRAM(s) Oral daily  polyethylene glycol 3350 17 Gram(s) Oral daily  sodium zirconium cyclosilicate 5 Gram(s) Oral daily      LABS: All Labs Reviewed:                        6.7    12.59 )-----------( 330      ( 17 Jul 2023 10:38 )             20.8     07-17    137  |  98  |  64<HH>  ----------------------------<  135<H>  4.4   |  29  |  2.7<H>    Ca    9.1      17 Jul 2023 10:38  Phos  2.1     07-17  Mg     2.2     07-17    TPro  5.3<L>  /  Alb  2.1<L>  /  TBili  0.3  /  DBili  x   /  AST  14  /  ALT  12  /  AlkPhos  130<H>  07-17          Blood Culture:     Radiology: reviewed

## 2023-07-17 NOTE — PROGRESS NOTE ADULT - SUBJECTIVE AND OBJECTIVE BOX
Over Night Events: events noted, vent dependant, renal reviewed    PHYSICAL EXAM    ICU Vital Signs Last 24 Hrs  T(C): 36.8 (17 Jul 2023 13:45), Max: 37 (16 Jul 2023 20:19)  T(F): 98.3 (17 Jul 2023 13:45), Max: 98.6 (16 Jul 2023 20:19)  HR: 81 (17 Jul 2023 15:00) (75 - 84)  BP: 109/57 (17 Jul 2023 13:45) (109/57 - 138/68)  RR: 18 (17 Jul 2023 13:45) (18 - 18)  SpO2: 100% (17 Jul 2023 15:00) (99% - 100%)    O2 Parameters below as of 17 Jul 2023 08:45  Patient On (Oxygen Delivery Method): ventilator            General: ill looking  HEENT: trach         Lungs: dec bs both bases  Cardiovascular: Regular   Abdomen: Soft, Positive BS  unresponsive      07-16-23 @ 07:01  -  07-17-23 @ 07:00  --------------------------------------------------------  IN:    Enteral Tube Flush: 120 mL    Peptamen A.F.: 750 mL  Total IN: 870 mL    OUT:  Total OUT: 0 mL    Total NET: 870 mL          LABS:                          6.7    12.59 )-----------( 330      ( 17 Jul 2023 10:38 )             20.8                                               07-17    137  |  98  |  64<HH>  ----------------------------<  135<H>  4.4   |  29  |  2.7<H>    Ca    9.1      17 Jul 2023 10:38  Phos  2.1     07-17  Mg     2.2     07-17    TPro  5.3<L>  /  Alb  2.1<L>  /  TBili  0.3  /  DBili  x   /  AST  14  /  ALT  12  /  AlkPhos  130<H>  07-17                                             Urinalysis Basic - ( 17 Jul 2023 10:38 )    Color: x / Appearance: x / SG: x / pH: x  Gluc: 135 mg/dL / Ketone: x  / Bili: x / Urobili: x   Blood: x / Protein: x / Nitrite: x   Leuk Esterase: x / RBC: x / WBC x   Sq Epi: x / Non Sq Epi: x / Bacteria: x                                                  LIVER FUNCTIONS - ( 17 Jul 2023 10:38 )  Alb: 2.1 g/dL / Pro: 5.3 g/dL / ALK PHOS: 130 U/L / ALT: 12 U/L / AST: 14 U/L / GGT: x                                                                                               Mode: AC/ CMV (Assist Control/ Continuous Mandatory Ventilation)  RR (machine): 14  TV (machine): 450  FiO2: 40  PEEP: 8  ITime: 1  MAP: 13  PIP: 33                                          MEDICATIONS  (STANDING):  albuterol/ipratropium for Nebulization 3 milliLiter(s) Nebulizer every 6 hours  aMIOdarone    Tablet   Oral   aMIOdarone    Tablet 200 milliGRAM(s) Oral daily  aspirin  chewable 81 milliGRAM(s) Oral daily  atorvastatin 80 milliGRAM(s) Oral at bedtime  buMETAnide 2 milliGRAM(s) Oral daily  chlorhexidine 0.12% Liquid 15 milliLiter(s) Oral Mucosa two times a day  chlorhexidine 2% Cloths 1 Application(s) Topical <User Schedule>  darbepoetin Injectable Syringe 40 MICROGram(s) IV Push <User Schedule>  dextrose 5%. 1000 milliLiter(s) (100 mL/Hr) IV Continuous <Continuous>  dextrose 5%. 1000 milliLiter(s) (50 mL/Hr) IV Continuous <Continuous>  dextrose 50% Injectable 25 Gram(s) IV Push once  dextrose 50% Injectable 25 Gram(s) IV Push once  dextrose 50% Injectable 12.5 Gram(s) IV Push once  glucagon  Injectable 1 milliGRAM(s) IntraMuscular once  heparin   Injectable 5000 Unit(s) SubCutaneous every 12 hours  insulin lispro (ADMELOG) corrective regimen sliding scale   SubCutaneous three times a day before meals  levETIRAcetam  Solution 500 milliGRAM(s) Enteral Tube two times a day  pantoprazole    Tablet 40 milliGRAM(s) Oral two times a day  polyethylene glycol 3350 17 Gram(s) Oral daily  sodium zirconium cyclosilicate 5 Gram(s) Oral daily    MEDICATIONS  (PRN):  acetaminophen     Tablet .. 650 milliGRAM(s) Oral every 6 hours PRN Temp greater or equal to 38C (100.4F), Mild Pain (1 - 3)  dextrose Oral Gel 15 Gram(s) Oral once PRN Blood Glucose LESS THAN 70 milliGRAM(s)/deciliter  sodium chloride 0.9% Bolus. 100 milliLiter(s) IV Bolus every 5 minutes PRN SBP LESS THAN or EQUAL to 80 mmHg  sodium chloride 0.9% Bolus. 100 milliLiter(s) IV Bolus every 5 minutes PRN SBP LESS THAN or EQUAL to 90 mmHg

## 2023-07-17 NOTE — PROGRESS NOTE ADULT - ASSESSMENT
IMPRESSION:    Sepsis present on admission resolved  Candida UTI sp tx  Pseudomonas PNA sp abx  anemia  left side atelectasis improved  Bilateral pleural effusions likely volume overload   MICHELLE on CKD III on dialysis   NSTEMI likely type 2  Hyponatremia resolved   Paroxysmal Afib  Acute on chronic anemia suspected UGI Bleed  Chronic respiratory failure  H/o ICH s/p trach and PEG  H/o CAD      PLAN:    CNS: Avoid CNS depressants     HEENT: Oral and trach care    PULMONARY: HOB 45. Aspiration.  No vent changes.  Goal saturation 92-96%. Vent dependent.  not weanable    CARDIOVASCULAR: Avoid overload.      GI:  Feeding  Bowel regimen     RENAL: trend CMP.  Correct as needed.  Nephrology following. HD per renal     INFECTIOUS DISEASE: ABX per ID.      HEMATOLOGICAL: DVT prophylaxis. transfuse 1 unit PRBC    ENDOCRINE: Follow up FS. Insulin protocol if needed.    DNR    Poor prognosis

## 2023-07-18 NOTE — PROGRESS NOTE ADULT - ATTENDING COMMENTS
62 y/o Male  with  PMH of ICH (2021) s/p trach and PEG, HTN, HLD, T2DM, CAD s/p stents, Recurrent C diff, BIBEMS from [facility] for abnormal labs. Patient non-verbal at baseline . Per NH chart  have a BUN greater than 200 and creatinine of 4.3 on outpatient labs. Outpatient CXR also w/ new L sided pleural effusion. In ED, patient was hypotensive with Afib with RVR, found to be in acute renal failure, anemic with elevated trops. He was started on Amiodarone GTT, PPI GTT, gastric lavage with coffee ground secretions, started on broad spectrum abx and admitted to MICU  While in MICU, course complicated by DKA, s/p insulin drip, MICHELLE started on HD, acute anemia s/p PRBC transfusion, NSTEMI, downgraded to SDU -> floor than downgraded to Vent Unit.    # Acute Renal Failure, started on HD 6/1, as per Renal team rec.   - daily BMP monitoring, daily weight, daily strict I/O chart  - patient is hypotensive- Bumex transitioned to PO 2 mg once daily to avoid symptomatic hypotension  - s/p permanent HD cath. placement on 7/7/23  - Midodrine prn. tx. prior to HD if b/p is borderline low    # anemia of chronic disease - no gross bleeding events  - 7/7; 7/17- s/p 1 unit PRBC,   transfuse prn for goal > 7    # Hyperkalemia- corrected     # Hypotension- d/c Cardizem, continue on Bumex, am serum Cortisol level- 12.3       # chronic respiratory failure, vent dependant via trach , trach care,  Vent management per Pulm. team   # Chronic dysphagia, sp peg, peg care   #Candiduria with U cx C tropicalis  # Afib w/ RVR - now rate controlled # Acute Decompensated CHF  CAD s/p stents  NSTEMI, medical management   # DKA, resolved  # Hx ICH   Bedbound from NH   - c/w keppra 500mg BID for seizure prophylaxis  # multiple sacral wounds. sacral and buttock , further wound care as per Wound care specialist report, frequent body position change, decub. prevention tx.     # hx of ICH, chronic non-verbal , quadriplegia - continue daily care.     # h/o DM 2- continue bsfs monitoring  with insulin sliding scale co.    DVT proph: Heparin subc. tx.     #Progress Note Handoff:  Patient remains with very poor overall prognosis , pending  candida auris swab cxs for placement, HD tx. as per renal team, monitor h/h   Family discussion: yes, medical team Disposition: SNF , once candida auris  swab results will be available .

## 2023-07-18 NOTE — CONSULT NOTE ADULT - REASON FOR ADMISSION
Abnormal labs

## 2023-07-18 NOTE — PROGRESS NOTE ADULT - ASSESSMENT
62 YO F  PMH of ICH (2021) s/p trach and PEG, HTN, HLD, T2DM, CAD s/p stents, Recurrent C diff, BIBEMS from [facility] for abnormal labs.     MICHELLE on CKD 3a - severe azotemia  likely due to GIB / hypotension  Started HD on 6/1/23  Acute anemia d/t UGIB  Troponemia  Lactic acidosis resolved   DKA resolved   Afib   -  HD today  -  bumex 2 q 12 / document UO  / check bladder scan   -  last phos 2 improving   -  h/h noted / tx if h <7 / sat elevated / PIYUSH  40 with HD /   - BP controlled   - [facility] on non dialysis days   - very poor prognosis   will follow

## 2023-07-18 NOTE — CONSULT NOTE ADULT - NS ATTEND AMEND GEN_ALL_CORE FT
As above patient seen and discussed during daily team rounds  As to evaluate pressure ulcers  leukocytosis- wbc 18k     Exam as above  Sacral wound grossly clean pink granulation  Left ischial wound with frankly necrotic tissue and purulent drainage wound swab sent for culture      Multiple pressure ulcers chronic and subacute  Loose necrotic tissue removed    Wounds irrigated and packed    Continue wound care,  offloading as above  Follow-up cultures; monitor WBC  May need excisional debridement
Covering  Service  Agree with above

## 2023-07-18 NOTE — CONSULT NOTE ADULT - PROVIDER SPECIALTY LIST ADULT
Burn
Vascular Surgery
Nutrition Support
Urology
Intervent Radiology
Nephrology
Burn
Cardiology
Gastroenterology
Infectious Disease
Palliative Care

## 2023-07-18 NOTE — CONSULT NOTE ADULT - CONSULT REQUESTED BY NAME
primary team
Dr Cabrera
Medicine
service
Dr Cordoba
ER
primary team
Primary team

## 2023-07-18 NOTE — PROGRESS NOTE ADULT - SUBJECTIVE AND OBJECTIVE BOX
Over Night Events: events noted, vent dependant, afebrile    PHYSICAL EXAM    ICU Vital Signs Last 24 Hrs  T(C): 36.7 (18 Jul 2023 05:21), Max: 36.8 (17 Jul 2023 13:45)  T(F): 98.1 (18 Jul 2023 05:21), Max: 98.3 (17 Jul 2023 13:45)  HR: 80 (18 Jul 2023 05:21) (78 - 85)  BP: 119/62 (18 Jul 2023 05:21) (98/56 - 119/62)  RR: 17 (18 Jul 2023 05:21) (14 - 18)  SpO2: 100% (18 Jul 2023 05:21) (100% - 100%)    O2 Parameters below as of 17 Jul 2023 16:41  Patient On (Oxygen Delivery Method): ventilator            General: ill looking  HEENT: trach  Lungs: Bilateral BS  Cardiovascular: Regular   Abdomen: Soft, Positive BS  unresponsive      07-16-23 @ 07:01  -  07-17-23 @ 07:00  --------------------------------------------------------  IN:    Enteral Tube Flush: 120 mL    Peptamen A.F.: 750 mL  Total IN: 870 mL    OUT:  Total OUT: 0 mL    Total NET: 870 mL      07-17-23 @ 07:01  -  07-18-23 @ 06:46  --------------------------------------------------------  IN:    Enteral Tube Flush: 120 mL    Peptamen A.F.: 750 mL    PRBCs (Packed Red Blood Cells): 339 mL  Total IN: 1209 mL    OUT:  Total OUT: 0 mL    Total NET: 1209 mL          LABS:                          6.7    12.59 )-----------( 330      ( 17 Jul 2023 10:38 )             20.8                                               07-17    137  |  98  |  64<HH>  ----------------------------<  135<H>  4.4   |  29  |  2.7<H>    Ca    9.1      17 Jul 2023 10:38  Phos  2.1     07-17  Mg     2.2     07-17    TPro  5.3<L>  /  Alb  2.1<L>  /  TBili  0.3  /  DBili  x   /  AST  14  /  ALT  12  /  AlkPhos  130<H>  07-17                                             Urinalysis Basic - ( 17 Jul 2023 10:38 )    Color: x / Appearance: x / SG: x / pH: x  Gluc: 135 mg/dL / Ketone: x  / Bili: x / Urobili: x   Blood: x / Protein: x / Nitrite: x   Leuk Esterase: x / RBC: x / WBC x   Sq Epi: x / Non Sq Epi: x / Bacteria: x                                                  LIVER FUNCTIONS - ( 17 Jul 2023 10:38 )  Alb: 2.1 g/dL / Pro: 5.3 g/dL / ALK PHOS: 130 U/L / ALT: 12 U/L / AST: 14 U/L / GGT: x                                                                                               Mode: AC/ CMV (Assist Control/ Continuous Mandatory Ventilation)  RR (machine): 14  TV (machine): 450  FiO2: 40  PEEP: 8  ITime: 1  MAP: 13  PIP: 27                                          MEDICATIONS  (STANDING):  albuterol/ipratropium for Nebulization 3 milliLiter(s) Nebulizer every 6 hours  aMIOdarone    Tablet 200 milliGRAM(s) Oral daily  aMIOdarone    Tablet   Oral   aspirin  chewable 81 milliGRAM(s) Oral daily  atorvastatin 80 milliGRAM(s) Oral at bedtime  buMETAnide 2 milliGRAM(s) Oral daily  chlorhexidine 0.12% Liquid 15 milliLiter(s) Oral Mucosa two times a day  chlorhexidine 2% Cloths 1 Application(s) Topical <User Schedule>  darbepoetin Injectable Syringe 40 MICROGram(s) IV Push <User Schedule>  dextrose 5%. 1000 milliLiter(s) (50 mL/Hr) IV Continuous <Continuous>  dextrose 5%. 1000 milliLiter(s) (100 mL/Hr) IV Continuous <Continuous>  dextrose 50% Injectable 25 Gram(s) IV Push once  dextrose 50% Injectable 25 Gram(s) IV Push once  dextrose 50% Injectable 12.5 Gram(s) IV Push once  glucagon  Injectable 1 milliGRAM(s) IntraMuscular once  heparin   Injectable 5000 Unit(s) SubCutaneous every 12 hours  insulin lispro (ADMELOG) corrective regimen sliding scale   SubCutaneous three times a day before meals  levETIRAcetam  Solution 500 milliGRAM(s) Enteral Tube two times a day  pantoprazole   Suspension 40 milliGRAM(s) Oral daily  polyethylene glycol 3350 17 Gram(s) Oral daily  sodium zirconium cyclosilicate 5 Gram(s) Oral daily    MEDICATIONS  (PRN):  acetaminophen     Tablet .. 650 milliGRAM(s) Oral every 6 hours PRN Temp greater or equal to 38C (100.4F), Mild Pain (1 - 3)  dextrose Oral Gel 15 Gram(s) Oral once PRN Blood Glucose LESS THAN 70 milliGRAM(s)/deciliter  sodium chloride 0.9% Bolus. 100 milliLiter(s) IV Bolus every 5 minutes PRN SBP LESS THAN or EQUAL to 90 mmHg  sodium chloride 0.9% Bolus. 100 milliLiter(s) IV Bolus every 5 minutes PRN SBP LESS THAN or EQUAL to 80 mmHg

## 2023-07-18 NOTE — PROGRESS NOTE ADULT - ASSESSMENT
IMPRESSION:    Sepsis present on admission resolved  Candida UTI sp tx  Pseudomonas PNA sp abx  anemia  left side atelectasis improved  Bilateral pleural effusions likely volume overload   MICHELLE on CKD III on dialysis   NSTEMI likely type 2  Hyponatremia resolved   Paroxysmal Afib  Acute on chronic anemia suspected UGI Bleed  Chronic respiratory failure  H/o ICH s/p trach and PEG  H/o CAD      PLAN:    CNS: Avoid CNS depressants     HEENT: Oral and trach care    PULMONARY: HOB 45. Aspiration.  No vent changes.  Goal saturation 92-96%. Vent dependent.  not weanable    CARDIOVASCULAR: Avoid overload.      GI:  Feeding  Bowel regimen     RENAL: trend CMP.  Correct as needed.  Nephrology following. HD per renal     INFECTIOUS DISEASE: ABX per ID.      HEMATOLOGICAL: DVT prophylaxis. fu cbc    ENDOCRINE: Follow up FS. Insulin protocol if needed.    DNR    Poor prognosis

## 2023-07-18 NOTE — CONSULT NOTE ADULT - CONSULT REQUESTED DATE/TIME
03-Jun-2023 07:14
18-Jul-2023 15:43
31-May-2023 09:36
31-May-2023 10:05
01-Jun-2023 10:33
02-Jun-2023 12:43
31-May-2023 16:09
31-May-2023 18:17
06-Jul-2023 15:57
15-Deepak-2023 15:30
29-Jun-2023 16:01

## 2023-07-18 NOTE — CONSULT NOTE ADULT - SUBJECTIVE AND OBJECTIVE BOX
Patient is a 63y old  Male who presents with a chief complaint of Abnormal labs (18 Jul 2023 10:43)      HPI:  64 yo M with  PMH of ICH (2021) s/p trach and PEG, HTN, HLD, T2DM, CAD s/p stents, Recurrent C diff, BIBEMS from [facility] for abnormal labs. Patient non-verbal at baseline - limited history. Per NH chart  have a BUN greater than 200 and creatinine of 4.3 on outpatient labs. Outpatient CXR also w/ new L sided pleural effusion. With EMS patient was also found to be in irregular rhythm, no known history of A-fib or a flutter.  In ED patient had borderline BP, and was in Atrial fibrillation. He was started on amiodarone gtt. Labs showed , Cr. 4.1, Hb 6.6. Trops 1.8. He was given 1U PRBC, gastric lavage revealed coffee ground emesis w/no active bleeding. He was started on PPI drip and GI consulted in ED. He was given IV aztreonam and vancomycin.   Patient being admitted to ICU for management of Sepsis likely from UTI, MICHELLE likely prerenal and NSTEMI.        (30 May 2023 23:39)    Burn consulted for evaluation of sacral and b/l ischial wounds    PAST MEDICAL & SURGICAL HISTORY:  HTN (hypertension)    Diabetes mellitus  H/O intracranial hemorrhage  H/O tracheostomy  PEG (percutaneous endoscopic gastrostomy) status  Hyperlipidemia        Allergies  penicillin (Other)    ICU Vital Signs Last 24 Hrs  T(C): 37.1 (18 Jul 2023 12:55), Max: 37.1 (18 Jul 2023 12:55)  T(F): 98.7 (18 Jul 2023 12:55), Max: 98.7 (18 Jul 2023 12:55)  HR: 89 (18 Jul 2023 12:55) (77 - 89)  BP: 100/54 (18 Jul 2023 12:55) (98/56 - 136/67)  BP(mean): --  ABP: --  ABP(mean): --  RR: 20 (18 Jul 2023 12:55) (14 - 20)  SpO2: 100% (18 Jul 2023 12:55) (100% - 100%)    O2 Parameters below as of 17 Jul 2023 16:41  Patient On (Oxygen Delivery Method): ventilator        PHYSICAL EXAM:  General: patient laying in bed, in NAD  wound: sacral wound: 4x10 cm full thickness wound with pink/red wound bed, healthy granulation noted, surrounding partial thickness wounds, pink clean. No purulence, bleeding, drainage.  Left ichium: 5x4 cm full thickness with pink/red wound bed,  healthy granulation noted. Small amounts of nonviable dermis and subcutis noted at the cent. Undermining present. +Malodor. Exudate noted. No active bleeding.  Right ichium: 1x1 cm partial thickness wound, pink moist with surrounding hyperpigmentation  Upper back: 1x1 cm loose yellow eschar, no drainage or bleeding Patient is a 63y old  Male who presents with a chief complaint of Abnormal labs (18 Jul 2023 10:43)      HPI:  62 yo M with  PMH of ICH (2021) s/p trach and PEG, HTN, HLD, T2DM, CAD s/p stents, Recurrent C diff, BIBEMS from [facility] for abnormal labs. Patient non-verbal at baseline - limited history. Per NH chart  have a BUN greater than 200 and creatinine of 4.3 on outpatient labs. Outpatient CXR also w/ new L sided pleural effusion. With EMS patient was also found to be in irregular rhythm, no known history of A-fib or a flutter.  In ED patient had borderline BP, and was in Atrial fibrillation. He was started on amiodarone gtt. Labs showed , Cr. 4.1, Hb 6.6. Trops 1.8. He was given 1U PRBC, gastric lavage revealed coffee ground emesis w/no active bleeding. He was started on PPI drip and GI consulted in ED. He was given IV aztreonam and vancomycin.   Patient being admitted to ICU for management of Sepsis likely from UTI, MICHELLE likely prerenal and NSTEMI.        (30 May 2023 23:39)    Burn consulted for evaluation of sacral and b/l ischial wounds    PAST MEDICAL & SURGICAL HISTORY:  HTN (hypertension)    Diabetes mellitus  H/O intracranial hemorrhage  H/O tracheostomy  PEG (percutaneous endoscopic gastrostomy) status  Hyperlipidemia        Allergies  penicillin (Other)    ICU Vital Signs Last 24 Hrs  T(C): 37.1 (18 Jul 2023 12:55), Max: 37.1 (18 Jul 2023 12:55)  T(F): 98.7 (18 Jul 2023 12:55), Max: 98.7 (18 Jul 2023 12:55)  HR: 89 (18 Jul 2023 12:55) (77 - 89)  BP: 100/54 (18 Jul 2023 12:55) (98/56 - 136/67)  ABP(mean): --  RR: 20 (18 Jul 2023 12:55) (14 - 20)  SpO2: 100% (18 Jul 2023 12:55) (100% - 100%)    O2 Parameters below as of 17 Jul 2023 16:41  Patient On (Oxygen Delivery Method): ventilator        PHYSICAL EXAM:  General: patient laying in bed, in NAD  wound: sacral wound: 4x10 cm full thickness wound with pink/red wound bed, healthy granulation noted, surrounding partial thickness wounds, pink clean. No purulence, bleeding, drainage.  Left ischium : 5x4 cm full thickness with pink/red wound bed,  healthy granulation noted.  frankly necrotic skin and subcutis noted at the center with purulent  drainage beneath. Undermining present. +Malodor. Exudate noted. No active bleeding.  Right ischium : 1x1 cm partial thickness wound, pink moist with surrounding hyperpigmentation  Upper back: 1x1 cm loose yellow eschar, no drainage or bleeding

## 2023-07-18 NOTE — PROGRESS NOTE ADULT - SUBJECTIVE AND OBJECTIVE BOX
Nephrology progress note    Patient was seen and examined, events over the last 24 h noted .  HD today    Allergies:  penicillin (Other)    Hospital Medications:   MEDICATIONS  (STANDING):  albuterol/ipratropium for Nebulization 3 milliLiter(s) Nebulizer every 6 hours  aMIOdarone    Tablet 200 milliGRAM(s) Oral daily  aMIOdarone    Tablet   Oral   aspirin  chewable 81 milliGRAM(s) Oral daily  atorvastatin 80 milliGRAM(s) Oral at bedtime  buMETAnide 2 milliGRAM(s) Oral daily  chlorhexidine 0.12% Liquid 15 milliLiter(s) Oral Mucosa two times a day  chlorhexidine 2% Cloths 1 Application(s) Topical <User Schedule>  darbepoetin Injectable Syringe 40 MICROGram(s) IV Push <User Schedule>  dextrose 5%. 1000 milliLiter(s) (100 mL/Hr) IV Continuous <Continuous>  dextrose 5%. 1000 milliLiter(s) (50 mL/Hr) IV Continuous <Continuous>  dextrose 50% Injectable 25 Gram(s) IV Push once  dextrose 50% Injectable 25 Gram(s) IV Push once  dextrose 50% Injectable 12.5 Gram(s) IV Push once  glucagon  Injectable 1 milliGRAM(s) IntraMuscular once  heparin   Injectable 5000 Unit(s) SubCutaneous every 12 hours  insulin lispro (ADMELOG) corrective regimen sliding scale   SubCutaneous three times a day before meals  levETIRAcetam  Solution 500 milliGRAM(s) Enteral Tube two times a day  pantoprazole   Suspension 40 milliGRAM(s) Oral daily  polyethylene glycol 3350 17 Gram(s) Oral daily  sodium zirconium cyclosilicate 5 Gram(s) Oral daily        VITALS:  T(F): 98.1 (07-18-23 @ 05:21), Max: 98.3 (07-17-23 @ 13:45)  HR: 84 (07-18-23 @ 08:30)  BP: 136/67 (07-18-23 @ 08:30)  RR: 17 (07-18-23 @ 05:21)  SpO2: 100% (07-18-23 @ 05:21)  Wt(kg): --    07-16 @ 07:01  -  07-17 @ 07:00  --------------------------------------------------------  IN: 870 mL / OUT: 0 mL / NET: 870 mL    07-17 @ 07:01  -  07-18 @ 07:00  --------------------------------------------------------  IN: 1209 mL / OUT: 0 mL / NET: 1209 mL          PHYSICAL EXAM:  Constitutional: NAD  HEENT: anicteric sclera, oropharynx clear, MMM  Neck: No JVD  Respiratory: CTAB, no wheezes, rales or rhonchi  Cardiovascular: S1, S2, RRR  Gastrointestinal: BS+, soft, NT/ND  Extremities: No cyanosis or clubbing. No peripheral edema  :  No teixeira.   Skin: No rashes    LABS:  07-17    137  |  98  |  64<HH>  ----------------------------<  135<H>  4.4   |  29  |  2.7<H>    Ca    9.1      17 Jul 2023 10:38  Phos  2.1     07-17  Mg     2.2     07-17    TPro  5.3<L>  /  Alb  2.1<L>  /  TBili  0.3  /  DBili      /  AST  14  /  ALT  12  /  AlkPhos  130<H>  07-17                          6.7    12.59 )-----------( 330      ( 17 Jul 2023 10:38 )             20.8       Urine Studies:  Urinalysis Basic - ( 17 Jul 2023 10:38 )    Color:  / Appearance:  / SG:  / pH:   Gluc: 135 mg/dL / Ketone:   / Bili:  / Urobili:    Blood:  / Protein:  / Nitrite:    Leuk Esterase:  / RBC:  / WBC    Sq Epi:  / Non Sq Epi:  / Bacteria:         RADIOLOGY & ADDITIONAL STUDIES:

## 2023-07-18 NOTE — PROGRESS NOTE ADULT - SUBJECTIVE AND OBJECTIVE BOX
ALICIA BARBOUR 63y Male  MRN#: [id number]     Hospital Day: 49d    Pt is currently admitted with the primary diagnosis of     Overnight events   -No major overnight events                                          ----------------------------------------------------------  OBJECTIVE  PAST MEDICAL & SURGICAL HISTORY  HTN (hypertension)    Diabetes mellitus    H/O intracranial hemorrhage    H/O tracheostomy    PEG (percutaneous endoscopic gastrostomy) status    Hyperlipidemia                                              -----------------------------------------------------------  MEDICATIONS:  STANDING MEDICATIONS  albuterol/ipratropium for Nebulization 3 milliLiter(s) Nebulizer every 6 hours  aMIOdarone    Tablet   Oral   aMIOdarone    Tablet 200 milliGRAM(s) Oral daily  aspirin  chewable 81 milliGRAM(s) Oral daily  atorvastatin 80 milliGRAM(s) Oral at bedtime  buMETAnide 2 milliGRAM(s) Oral daily  chlorhexidine 0.12% Liquid 15 milliLiter(s) Oral Mucosa two times a day  chlorhexidine 2% Cloths 1 Application(s) Topical <User Schedule>  darbepoetin Injectable Syringe 40 MICROGram(s) IV Push <User Schedule>  dextrose 5%. 1000 milliLiter(s) IV Continuous <Continuous>  dextrose 5%. 1000 milliLiter(s) IV Continuous <Continuous>  dextrose 50% Injectable 25 Gram(s) IV Push once  dextrose 50% Injectable 25 Gram(s) IV Push once  dextrose 50% Injectable 12.5 Gram(s) IV Push once  glucagon  Injectable 1 milliGRAM(s) IntraMuscular once  heparin   Injectable 5000 Unit(s) SubCutaneous every 12 hours  insulin lispro (ADMELOG) corrective regimen sliding scale   SubCutaneous three times a day before meals  levETIRAcetam  Solution 500 milliGRAM(s) Enteral Tube two times a day  pantoprazole   Suspension 40 milliGRAM(s) Oral daily  polyethylene glycol 3350 17 Gram(s) Oral daily  sodium zirconium cyclosilicate 5 Gram(s) Oral daily    PRN MEDICATIONS  acetaminophen     Tablet .. 650 milliGRAM(s) Oral every 6 hours PRN  dextrose Oral Gel 15 Gram(s) Oral once PRN  sodium chloride 0.9% Bolus. 100 milliLiter(s) IV Bolus every 5 minutes PRN  sodium chloride 0.9% Bolus. 100 milliLiter(s) IV Bolus every 5 minutes PRN                                            ------------------------------------------------------------  VITAL SIGNS: Last 24 Hours  T(C): 37.1 (18 Jul 2023 12:55), Max: 37.1 (18 Jul 2023 12:55)  T(F): 98.7 (18 Jul 2023 12:55), Max: 98.7 (18 Jul 2023 12:55)  HR: 89 (18 Jul 2023 12:55) (77 - 89)  BP: 100/54 (18 Jul 2023 12:55) (100/54 - 136/67)  BP(mean): --  RR: 20 (18 Jul 2023 12:55) (16 - 20)  SpO2: 100% (18 Jul 2023 12:55) (100% - 100%)      07-17-23 @ 07:01  -  07-18-23 @ 07:00  --------------------------------------------------------  IN: 1209 mL / OUT: 0 mL / NET: 1209 mL    07-18-23 @ 07:01 - 07-18-23 @ 17:22  --------------------------------------------------------  IN: 0 mL / OUT: 2000 mL / NET: -2000 mL                                             --------------------------------------------------------------  LABS:                        8.1    16.88 )-----------( 401      ( 18 Jul 2023 11:25 )             24.9     07-18    138  |  99  |  39<H>  ----------------------------<  125<H>  3.7   |  30  |  1.7<H>    Ca    9.1      18 Jul 2023 11:25  Phos  1.4     07-18  Mg     2.1     07-18    TPro  6.1  /  Alb  2.4<L>  /  TBili  0.3  /  DBili  x   /  AST  16  /  ALT  13  /  AlkPhos  151<H>  07-18      Urinalysis Basic - ( 18 Jul 2023 11:25 )    Color: x / Appearance: x / SG: x / pH: x  Gluc: 125 mg/dL / Ketone: x  / Bili: x / Urobili: x   Blood: x / Protein: x / Nitrite: x   Leuk Esterase: x / RBC: x / WBC x   Sq Epi: x / Non Sq Epi: x / Bacteria: x          Rads:  CXR 7/18  Impression:  Hazy increased density right hemithorax possibly representing pleural   effusion. No pneumothorax                                                --------------------------------------------------------------  PHYSICAL EXAM:  deferred while at dialysis     PLAN:    # DVT prophylaxis     # GI prophylaxis     # Diet     # Activity Score (AM-PAC)    #Progress Note Handoff  Pending (specify):  Family discussion:   Disposition:

## 2023-07-18 NOTE — PROGRESS NOTE ADULT - ASSESSMENT
62 yo M with  PMH of ICH (2021) s/p trach and PEG, HTN, HLD, T2DM, CAD s/p stents, Recurrent C diff, BIBEMS from [facility] for abnormal labs. Patient non-verbal at baseline - limited history. Per NH chart  have a BUN greater than 200 and creatinine of 4.3 on outpatient labs. Outpatient CXR also w/ new L sided pleural effusion. In ED, patient was hypotensive with Afib with RVR, found to be in acure renal failure, anemic with elevated trops. He was started on Amiodarone GTT, PPI GTT, gastric lavage with coffee ground secretions, started on broad spectrum abx and admitted to MICU  While in MICU, course complicated by DKA, s/p insulin drip, MICHELLE started on HD, acute anemia s/p PRBC transfusion, NSTEMI, downgraded to SDU -> floor. pending placement.    # Acute Renal Failure, started on HD 6/1  # Mild Hyperkalemia  # Fluid Overload- Improving- c/w Bumex and HD  # HAGMA- resolved  - HD port created 7/7, received HD 7/7, 7/11  - Nephro on board- likely HD twice a week. Daily BMP, weight, strict I/O  - midodrine 1 hr before HD if SBP <100  - s/p Midodrine - BP better, s/p Sodium Bicarb- acidosis improved  - TTE 6/1 - EF 63%, GIDD   - Lokelma on non dialysis days   - monitor BMP    #Hypotension  Cardizem was discontinued and holding parameters placed on Bumex  AM serum cortisol WNL    # Afib w/ RVR -   # Acute Decompensated CHF  # CAD s/p stents  # NSTEMI type 2  -now rate controlled- c/w Amio and dc'ed Cardizem  -bumex  -Seen by cardio 5/31, recommend c/w asa and stating, medical management for NSTEMI    # anemia of chronic disease  - iron WNL  - monitor H/H, keep Hgb > 7  - received multiple blood transfusions  - last transfusion 7/17    # chronic respiratory failure,   vent dependant via trach   trach care    # functional dysphagia,   sp peg   peg care     # Candiduria with U cx C tropicalis  # MDRO UTI and PNA-  s/p Fluconazole  s/p Ceftolozane-tazobactam    # Hyponatremia-resolved    # DKA, resolved  -s/p insulin drip, SSI and tube feeds  Monitor FS     # Diarrhea   GI PCR -ve, C.diff -not detected    cont dignishield     # Hx ICH  # multiple sacral wounds  # functional paraplegia ,   Bedbound from NH  has upper ext contracture total care   c/w keppra 500mg BID for seizure prophylaxis  sacral and buttock wounds , un-stageable  Care instructions per wound care:   - No surgical intervention at this time  - LWC: sacrum: hydrogel/VASHE wet to dry/ allevyn pad BID; upper back: hydrogel allevyn BID  - Wcx taken 7/18, f/u  - offloading and repositioning     #Misc  -DVT prophylaxis: Heparin SQ  -GI prophylaxis: Protonix IV BID  -Diet: Tube feed  -Code status: DNR  -Activity: IAT  -Dispo: pending    #Progress Note Handoff:  Patient remains with very poor overall prognosis , pending  candida auris swab cxs for placement, HD tx. as per renal team, monitor h/h   Family discussion: yes, medical team Disposition: SNF , once candida auris  swab results will be available .

## 2023-07-18 NOTE — CONSULT NOTE ADULT - ASSESSMENT
64 yo M with  PMH of ICH (2021) s/p trach and PEG, HTN, HLD, T2DM, CAD s/p stents, Recurrent C diff with saceral     Plan:   - No surgical intervention at this time  - LWC: sacrum: hydrogel/VASHE wet to dry/ allevyn pad BID; upper back: hydrogel allevyn BID  - offloading and repositioning   - Adequate nutrition   - Remainder of care per primary team 64 yo M with  PMH of ICH (2021) s/p trach and PEG, HTN, HLD, T2DM, CAD s/p stents, Recurrent C diff with saceral     Plan:   - No surgical intervention at this time  - LWC: sacrum: hydrogel/VASHE wet to dry/ allevyn pad BID; upper back: hydrogel allevyn BID  - Wcx taken 7/18, f/u  - offloading and repositioning   - Adequate nutrition   - Remainder of care per primary team 62 yo M with  PMH of ICH (2021) s/p trach and PEG, HTN, HLD, T2DM, CAD s/p stents, Recurrent C diff with sacral     Plan:   - No surgical intervention at this time  - LWC: sacrum: hydrogel/VASHE wet to dry/ allevyn pad BID; upper back: hydrogel allevyn BID  - Wcx swab for cx obtained 7/18, f/u  - offloading and repositioning   - Adequate nutrition   - Remainder of care per primary team

## 2023-07-18 NOTE — CONSULT NOTE ADULT - CONSULT REASON
TDC placement
enteral feeding
MICHELLE
sacral and b/l ischial wounds
CGE
multiple wounds
Elevated troponin
HD access
sepsis
Urinary retention , difficult CIC
GOC

## 2023-07-18 NOTE — PROGRESS NOTE ADULT - ATTENDING SUPERVISION STATEMENT
Resident
Fellow
Resident
Fellow
Resident

## 2023-07-19 NOTE — PROGRESS NOTE ADULT - ASSESSMENT
IMPRESSION:    Sepsis present on admission resolved  Candida UTI sp tx  Pseudomonas PNA sp abx  anemia  left side atelectasis improved  Bilateral pleural effusions likely volume overload   MICHELLE on CKD III on dialysis   NSTEMI likely type 2  Hyponatremia resolved   Paroxysmal Afib  Acute on chronic anemia suspected UGI Bleed  Chronic respiratory failure  H/o ICH s/p trach and PEG  H/o CAD      PLAN:    CNS: Avoid CNS depressants     HEENT: Oral and trach care    PULMONARY: HOB 45. Aspiration.  No vent changes.  Goal saturation 92-96%. Vent dependent.      CARDIOVASCULAR: Avoid overload.      GI:  Feeding  Bowel regimen     RENAL: trend CMP.  Correct as needed.  Nephrology following. HD per renal     INFECTIOUS DISEASE: ABX per ID.  repeat CX    HEMATOLOGICAL: DVT prophylaxis. fu cbc noted    ENDOCRINE: Follow up FS. Insulin protocol if needed.    DNR    Poor prognosis

## 2023-07-19 NOTE — PROGRESS NOTE ADULT - SUBJECTIVE AND OBJECTIVE BOX
VIJAYSTEPHON ALICIA  [facility] 3A (Back) 027 A ([facility] 3A (Back))      Patient was evaluated and examined  by bedside, remains on vent support, tolerating peg feedings      REVIEW OF SYSTEMS: unable to obtain, patient is non-verbal       T(C): , Max: 37.7 (07-18-23 @ 19:46)  HR: 84 (07-19-23 @ 13:30)  BP: 102/53 (07-19-23 @ 13:30)  RR: 18 (07-19-23 @ 05:21)  SpO2: 100% (07-19-23 @ 13:30)  CAPILLARY BLOOD GLUCOSE      POCT Blood Glucose.: 144 mg/dL (19 Jul 2023 16:56)  POCT Blood Glucose.: 144 mg/dL (19 Jul 2023 11:40)  POCT Blood Glucose.: 136 mg/dL (19 Jul 2023 06:25)  POCT Blood Glucose.: 194 mg/dL (18 Jul 2023 21:42)  POCT Blood Glucose.: 157 mg/dL (18 Jul 2023 17:32)      PHYSICAL EXAM:  General: NAD, Awake, patient is laying comfortably in bed  HEENT: AT, NC, trach present  Lungs: good breath sounds B/L, no wheezing, no rhonchi  CVS: normal S1, S2, RRR, NO M/G/R  Abdomen: soft, bowel sounds present, non-tender, non-distended, peg present   Extremities: b/l upper and lower ext. edema, no clubbing, no cyanosis, positive peripheral pulses b/l  Neuro: persistent unresponsive state  Skin: sacral wound , covered with dressing       LAB  CBC  Date: 07-19-23 @ 07:34  Mean cell Jqqfhobtin15.9  Mean cell Hemoglobin Conc31.5  Mean cell Volum 95.1  Platelet count-Automate 341  RBC Count 2.64  Red Cell Distrib Width15.6  WBC Count14.06  % Albumin, Urine--  Hematocrit 25.1  Hemoglobin 7.9          [facility]  07-19-23 @ 07:34  Blood Gas Arterial-Calcium,Ionized--  Blood Urea Nitrogen, Serum 53 mg/dL<H> [10 - 20]  Carbon Dioxide, Serum27 mmol/L [17 - 32]  Chloride, Serum94 mmol/L<L> [98 - 110]  Creatinie, Serum2.4 mg/dL<H> [0.7 - 1.5]  Glucose, Sqpym550 mg/dL<H> [70 - 99]  Potassium, Serum4.3 mmol/L [3.5 - 5.0]  Sodium, Serum 129 mmol/L<L> [135 - 146]  BMP  07-18-23 @ 11:25  Blood Gas Arterial-Calcium,Ionized--  Blood Urea Nitrogen, Serum 39 mg/dL<H> [10 - 20]  Carbon Dioxide, Serum30 mmol/L [17 - 32]  Chloride, Serum99 mmol/L [98 - 110]  Creatinie, Serum1.7 mg/dL<H> [0.7 - 1.5]  Glucose, Daqmj404 mg/dL<H> [70 - 99]  Potassium, Serum3.7 mmol/L [3.5 - 5.0]  Sodium, Serum 138 mmol/L [135 - 146]          Microbiology:    Culture - Fungal, Other (collected 07-05-23 @ 12:20)  Source: .Other Other, RIGHT AXILLARY SWAB  Preliminary Report (07-15-23 @ 15:02):    No fungus isolated at 1 week.        Medications:  acetaminophen     Tablet .. 650 milliGRAM(s) Oral every 6 hours PRN  albuterol/ipratropium for Nebulization 3 milliLiter(s) Nebulizer every 6 hours  aMIOdarone    Tablet   Oral   aMIOdarone    Tablet 200 milliGRAM(s) Oral daily  aspirin  chewable 81 milliGRAM(s) Oral daily  atorvastatin 80 milliGRAM(s) Oral at bedtime  buMETAnide 2 milliGRAM(s) Oral daily  chlorhexidine 0.12% Liquid 15 milliLiter(s) Oral Mucosa two times a day  chlorhexidine 2% Cloths 1 Application(s) Topical <User Schedule>  collagenase Ointment 1 Application(s) Topical two times a day  darbepoetin Injectable Syringe 40 MICROGram(s) IV Push <User Schedule>  dextrose 5%. 1000 milliLiter(s) IV Continuous <Continuous>  dextrose 5%. 1000 milliLiter(s) IV Continuous <Continuous>  dextrose 50% Injectable 12.5 Gram(s) IV Push once  dextrose 50% Injectable 25 Gram(s) IV Push once  dextrose 50% Injectable 25 Gram(s) IV Push once  dextrose Oral Gel 15 Gram(s) Oral once PRN  glucagon  Injectable 1 milliGRAM(s) IntraMuscular once  heparin   Injectable 5000 Unit(s) SubCutaneous every 12 hours  insulin lispro (ADMELOG) corrective regimen sliding scale   SubCutaneous three times a day before meals  levETIRAcetam  Solution 500 milliGRAM(s) Enteral Tube two times a day  pantoprazole   Suspension 40 milliGRAM(s) Oral daily  polyethylene glycol 3350 17 Gram(s) Oral daily  sodium chloride 0.9% Bolus. 100 milliLiter(s) IV Bolus every 5 minutes PRN  sodium chloride 0.9% Bolus. 100 milliLiter(s) IV Bolus every 5 minutes PRN  sodium zirconium cyclosilicate 5 Gram(s) Oral daily        Assessment and Plan:  62 y/o Male  with  PMH of ICH (2021) s/p trach and PEG, HTN, HLD, T2DM, CAD s/p stents, Recurrent C diff, BIBEMS from [facility] for abnormal labs. Patient non-verbal at baseline . Per NH chart  have a BUN greater than 200 and creatinine of 4.3 on outpatient labs. Outpatient CXR also w/ new L sided pleural effusion. In ED, patient was hypotensive with Afib with RVR, found to be in acute renal failure, anemic with elevated trops. He was started on Amiodarone GTT, PPI GTT, gastric lavage with coffee ground secretions, started on broad spectrum abx and admitted to MICU  While in MICU, course complicated by DKA, s/p insulin drip, MICHELLE started on HD, acute anemia s/p PRBC transfusion, NSTEMI, downgraded to SDU -> floor than downgraded to Vent Unit.    # Acute Renal Failure, started on HD 6/1, as per Renal team rec.   - daily BMP monitoring, daily weight, daily strict I/O chart  - patient is hypotensive- Bumex transitioned to PO 2 mg once daily to avoid symptomatic hypotension  - s/p permanent HD cath. placement on 7/7/23  - Midodrine prn. tx. prior to HD if b/p is borderline low    # anemia of chronic disease - no gross bleeding events  - 7/7; 7/17- s/p 1 unit PRBC,   transfuse prn for goal > 7    # Hyperkalemia- corrected     # Hypotension- d/c Cardizem, continue on Bumex, am serum Cortisol level- 12.3       # chronic respiratory failure, vent dependant via trach , trach care,  Vent management per Pulm. team   # Chronic dysphagia, sp peg, peg care   #Candiduria with U cx C tropicalis  # Afib w/ RVR - now rate controlled # Acute Decompensated CHF  CAD s/p stents  NSTEMI, medical management   # DKA, resolved  # Hx ICH   Bedbound from NH   - c/w keppra 500mg BID for seizure prophylaxis  # multiple sacral wounds. sacral and buttock , further wound care as per Wound care specialist report, frequent body position change, decub. prevention tx.     # hx of ICH, chronic non-verbal , quadriplegia - continue daily care.     # h/o DM 2- continue bsfs monitoring  with insulin sliding scale co.    DVT proph: Heparin subc. tx.     #Progress Note Handoff:  Patient remains with very poor overall prognosis ,  HD tx. as per renal team, monitor h/h   Family discussion: yes, medical team Disposition: SNF possibly tomorrow    Total time spent to complete patient's bedside assessment, review medical chart, discuss medical plan of care with covering medical team was more than 35 minutes with >50% of time spent face to face with patient, discussion with patient/family and/or coordination of care

## 2023-07-19 NOTE — PROGRESS NOTE ADULT - SUBJECTIVE AND OBJECTIVE BOX
Over Night Events: events noted, vent dependant, low grade fever, burn/ renal noted    PHYSICAL EXAM    ICU Vital Signs Last 24 Hrs  T(C): 37.7 (19 Jul 2023 05:21), Max: 37.7 (18 Jul 2023 19:46)  T(F): 99.9 (19 Jul 2023 05:21), Max: 99.9 (18 Jul 2023 19:46)  HR: 81 (19 Jul 2023 05:21) (75 - 89)  BP: 114/59 (19 Jul 2023 05:21) (100/54 - 136/67)  RR: 18 (19 Jul 2023 05:21) (18 - 20)  SpO2: 100% (19 Jul 2023 05:21) (100% - 100%)        General: ill looking  HEENT:  trach  Lungs: Bilateral BS  Cardiovascular: Regular   Abdomen: Soft, Positive BS  sacral ulcer  unrepsonsive    07-18-23 @ 07:01  -  07-19-23 @ 07:00  --------------------------------------------------------  IN:    Enteral Tube Flush: 120 mL    Peptamen A.F.: 1500 mL  Total IN: 1620 mL    OUT:    Other (mL): 2000 mL  Total OUT: 2000 mL    Total NET: -380 mL          LABS:                          8.1    16.88 )-----------( 401      ( 18 Jul 2023 11:25 )             24.9                                               07-18    138  |  99  |  39<H>  ----------------------------<  125<H>  3.7   |  30  |  1.7<H>    Ca    9.1      18 Jul 2023 11:25  Phos  1.4     07-18  Mg     2.1     07-18    TPro  6.1  /  Alb  2.4<L>  /  TBili  0.3  /  DBili  x   /  AST  16  /  ALT  13  /  AlkPhos  151<H>  07-18                                             Urinalysis Basic - ( 18 Jul 2023 11:25 )    Color: x / Appearance: x / SG: x / pH: x  Gluc: 125 mg/dL / Ketone: x  / Bili: x / Urobili: x   Blood: x / Protein: x / Nitrite: x   Leuk Esterase: x / RBC: x / WBC x   Sq Epi: x / Non Sq Epi: x / Bacteria: x                                                  LIVER FUNCTIONS - ( 18 Jul 2023 11:25 )  Alb: 2.4 g/dL / Pro: 6.1 g/dL / ALK PHOS: 151 U/L / ALT: 13 U/L / AST: 16 U/L / GGT: x                                                                                               Mode: AC/ CMV (Assist Control/ Continuous Mandatory Ventilation)  RR (machine): 14  TV (machine): 450  FiO2: 40  PEEP: 8  ITime: 1  MAP: 12  PIP: 26                                          MEDICATIONS  (STANDING):  albuterol/ipratropium for Nebulization 3 milliLiter(s) Nebulizer every 6 hours  aMIOdarone    Tablet   Oral   aMIOdarone    Tablet 200 milliGRAM(s) Oral daily  aspirin  chewable 81 milliGRAM(s) Oral daily  atorvastatin 80 milliGRAM(s) Oral at bedtime  buMETAnide 2 milliGRAM(s) Oral daily  chlorhexidine 0.12% Liquid 15 milliLiter(s) Oral Mucosa two times a day  chlorhexidine 2% Cloths 1 Application(s) Topical <User Schedule>  collagenase Ointment 1 Application(s) Topical two times a day  darbepoetin Injectable Syringe 40 MICROGram(s) IV Push <User Schedule>  dextrose 5%. 1000 milliLiter(s) (50 mL/Hr) IV Continuous <Continuous>  dextrose 5%. 1000 milliLiter(s) (100 mL/Hr) IV Continuous <Continuous>  dextrose 50% Injectable 12.5 Gram(s) IV Push once  dextrose 50% Injectable 25 Gram(s) IV Push once  dextrose 50% Injectable 25 Gram(s) IV Push once  glucagon  Injectable 1 milliGRAM(s) IntraMuscular once  heparin   Injectable 5000 Unit(s) SubCutaneous every 12 hours  insulin lispro (ADMELOG) corrective regimen sliding scale   SubCutaneous three times a day before meals  levETIRAcetam  Solution 500 milliGRAM(s) Enteral Tube two times a day  pantoprazole   Suspension 40 milliGRAM(s) Oral daily  polyethylene glycol 3350 17 Gram(s) Oral daily  sodium zirconium cyclosilicate 5 Gram(s) Oral daily    MEDICATIONS  (PRN):  acetaminophen     Tablet .. 650 milliGRAM(s) Oral every 6 hours PRN Temp greater or equal to 38C (100.4F), Mild Pain (1 - 3)  dextrose Oral Gel 15 Gram(s) Oral once PRN Blood Glucose LESS THAN 70 milliGRAM(s)/deciliter  sodium chloride 0.9% Bolus. 100 milliLiter(s) IV Bolus every 5 minutes PRN SBP LESS THAN or EQUAL to 80 mmHg  sodium chloride 0.9% Bolus. 100 milliLiter(s) IV Bolus every 5 minutes PRN SBP LESS THAN or EQUAL to 90 mmHg

## 2023-07-20 NOTE — PROGRESS NOTE ADULT - SUBJECTIVE AND OBJECTIVE BOX
Over Night Events: events noted, vent dependant, low grade fever    PHYSICAL EXAM    ICU Vital Signs Last 24 Hrs  T(C): 37.4 (20 Jul 2023 05:33), Max: 37.5 (19 Jul 2023 13:30)  T(F): 99.3 (20 Jul 2023 05:33), Max: 99.5 (19 Jul 2023 13:30)  HR: 75 (20 Jul 2023 05:33) (75 - 85)  BP: 108/56 (20 Jul 2023 05:33) (102/53 - 117/62)  RR: 18 (20 Jul 2023 05:33) (18 - 18)  SpO2: 100% (20 Jul 2023 05:33) (100% - 100%)    O2 Parameters below as of 20 Jul 2023 05:33  Patient On (Oxygen Delivery Method): ventilator            General: ill looking  HEENT: trach             Lungs: Bilateral BS  Cardiovascular: Regular   Abdomen: Soft, Positive BS  unresponsive  multiple sacral ulcer      07-18-23 @ 07:01  -  07-19-23 @ 07:00  --------------------------------------------------------  IN:    Enteral Tube Flush: 120 mL    Peptamen A.F.: 1500 mL  Total IN: 1620 mL    OUT:    Other (mL): 2000 mL  Total OUT: 2000 mL    Total NET: -380 mL      07-19-23 @ 07:01  -  07-20-23 @ 05:48  --------------------------------------------------------  IN:    Peptamen A.F.: 750 mL  Total IN: 750 mL    OUT:  Total OUT: 0 mL    Total NET: 750 mL          LABS:                          7.9    14.06 )-----------( 341      ( 19 Jul 2023 07:34 )             25.1                                               07-19    138  |  101  |  59<H>  ----------------------------<  134<H>  4.7   |  24  |  2.6<H>    Ca    9.1      19 Jul 2023 16:37  Phos  1.9     07-19  Mg     2.2     07-19    TPro  6.1  /  Alb  2.4<L>  /  TBili  0.3  /  DBili  x   /  AST  16  /  ALT  13  /  AlkPhos  151<H>  07-18                                             Urinalysis Basic - ( 19 Jul 2023 16:37 )    Color: x / Appearance: x / SG: x / pH: x  Gluc: 134 mg/dL / Ketone: x  / Bili: x / Urobili: x   Blood: x / Protein: x / Nitrite: x   Leuk Esterase: x / RBC: x / WBC x   Sq Epi: x / Non Sq Epi: x / Bacteria: x                                                  LIVER FUNCTIONS - ( 18 Jul 2023 11:25 )  Alb: 2.4 g/dL / Pro: 6.1 g/dL / ALK PHOS: 151 U/L / ALT: 13 U/L / AST: 16 U/L / GGT: x                                                                                               Mode: AC/ CMV (Assist Control/ Continuous Mandatory Ventilation)  RR (machine): 14  TV (machine): 450  FiO2: 40  PEEP: 8  ITime: 1  MAP: 12  PIP: 29                                          MEDICATIONS  (STANDING):  albuterol/ipratropium for Nebulization 3 milliLiter(s) Nebulizer every 6 hours  aMIOdarone    Tablet 200 milliGRAM(s) Oral daily  aMIOdarone    Tablet   Oral   aspirin  chewable 81 milliGRAM(s) Oral daily  atorvastatin 80 milliGRAM(s) Oral at bedtime  buMETAnide 2 milliGRAM(s) Oral daily  chlorhexidine 0.12% Liquid 15 milliLiter(s) Oral Mucosa two times a day  chlorhexidine 2% Cloths 1 Application(s) Topical <User Schedule>  collagenase Ointment 1 Application(s) Topical two times a day  darbepoetin Injectable Syringe 40 MICROGram(s) IV Push <User Schedule>  dextrose 5%. 1000 milliLiter(s) (100 mL/Hr) IV Continuous <Continuous>  dextrose 5%. 1000 milliLiter(s) (50 mL/Hr) IV Continuous <Continuous>  dextrose 50% Injectable 25 Gram(s) IV Push once  dextrose 50% Injectable 25 Gram(s) IV Push once  dextrose 50% Injectable 12.5 Gram(s) IV Push once  glucagon  Injectable 1 milliGRAM(s) IntraMuscular once  heparin   Injectable 5000 Unit(s) SubCutaneous every 12 hours  insulin lispro (ADMELOG) corrective regimen sliding scale   SubCutaneous three times a day before meals  levETIRAcetam  Solution 500 milliGRAM(s) Enteral Tube two times a day  pantoprazole   Suspension 40 milliGRAM(s) Oral daily  polyethylene glycol 3350 17 Gram(s) Oral daily  sodium zirconium cyclosilicate 5 Gram(s) Oral daily    MEDICATIONS  (PRN):  acetaminophen     Tablet .. 650 milliGRAM(s) Oral every 6 hours PRN Temp greater or equal to 38C (100.4F), Mild Pain (1 - 3)  dextrose Oral Gel 15 Gram(s) Oral once PRN Blood Glucose LESS THAN 70 milliGRAM(s)/deciliter  sodium chloride 0.9% Bolus. 100 milliLiter(s) IV Bolus every 5 minutes PRN SBP LESS THAN or EQUAL to 80 mmHg  sodium chloride 0.9% Bolus. 100 milliLiter(s) IV Bolus every 5 minutes PRN SBP LESS THAN or EQUAL to 90 mmHg

## 2023-07-20 NOTE — DISCHARGE NOTE NURSING/CASE MANAGEMENT/SOCIAL WORK - PATIENT PORTAL LINK FT
You can access the FollowMyHealth Patient Portal offered by [facility] by registering at the following website: [email or web address]. By joining [employer]’s FollowMyHealth portal, you will also be able to view your health information using other applications (apps) compatible with our system.

## 2023-07-20 NOTE — PROGRESS NOTE ADULT - SUBJECTIVE AND OBJECTIVE BOX
VIJAYSTEPHON ALICIA  [facility] 3A (Back) 027 A ([facility] (Back))      Patient was evaluated and examined  by bedside, no active events over night as per covering nurse report      REVIEW OF SYSTEMS: unable to obtain, patient is non-verbal        T(C): , Max: 37.4 (07-20-23 @ 05:33)  HR: 75 (07-20-23 @ 05:33)  BP: 108/56 (07-20-23 @ 05:33)  RR: 18 (07-20-23 @ 05:33)  SpO2: 100% (07-20-23 @ 05:33)  CAPILLARY BLOOD GLUCOSE      POCT Blood Glucose.: 149 mg/dL (20 Jul 2023 12:01)  POCT Blood Glucose.: 137 mg/dL (20 Jul 2023 05:50)  POCT Blood Glucose.: 183 mg/dL (19 Jul 2023 21:46)  POCT Blood Glucose.: 144 mg/dL (19 Jul 2023 16:56)      PHYSICAL EXAM:  General: NAD, patient is laying comfortably in bed  HEENT: AT, NC, trach present  Lungs: good breath sounds B/L, no wheezing, no rhonchi  CVS: normal S1, S2, RRR, NO M/G/R  Abdomen: soft, bowel sounds present, non-tender, non-distended, peg present   Extremities: b/l upper and lower ext. edema, no clubbing, no cyanosis, positive peripheral pulses b/l  Neuro: persistent unresponsive state  Skin: sacral wound , covered with dressing           LAB  CBC  Date: 07-20-23 @ 08:25  Mean cell Lsvbhursxh32.9  Mean cell Hemoglobin Conc31.5  Mean cell Volum 95.1  Platelet count-Automate 335  RBC Count 2.84  Red Cell Distrib Width15.3  WBC Count11.60  % Albumin, Urine--  Hematocrit 27.0  Hemoglobin 8.5  CBC  Date: 07-19-23 @ 07:34  Mean cell Wkixqketcp54.9  Mean cell Hemoglobin Conc31.5  Mean cell Volum 95.1  Platelet count-Automate 341  RBC Count 2.64  Red Cell Distrib Width15.6  WBC Count14.06  % Albumin, Urine--  Hematocrit 25.1  Hemoglobin 7.9  CBC  Date: 07-18-23 @ 11:25  Mean cell Bbquuuskuc63.6  Mean cell Hemoglobin Conc32.5  Mean cell Volum 94.0  Platelet count-Automate 401  RBC Count 2.65  Red Cell Distrib Width15.7  WBC Count16.88  % Albumin, Urine--  Hematocrit 24.9  Hemoglobin 8.1  CBC  Date: 07-17-23 @ 10:38  Mean cell Oyjrixjvnn61.7  Mean cell Hemoglobin Conc32.2  Mean cell Volum 95.4  Platelet count-Automate 330  RBC Count 2.18  Red Cell Distrib Width14.8  WBC Count12.59  % Albumin, Urine--  Hematocrit 20.8  Hemoglobin 6.7  CBC  Date: 07-16-23 @ 08:47  Mean cell Nwhzckiyqe64.7  Mean cell Hemoglobin Conc30.6  Mean cell Volum 97.0  Platelet count-Automate 287  RBC Count 2.36  Red Cell Distrib Width15.3  WBC Count10.86  % Albumin, Urine--  Hematocrit 22.9  Hemoglobin 7.0  CBC  Date: 07-14-23 @ 07:22  Mean cell Pjxnaizglu46.6  Mean cell Hemoglobin Conc31.0  Mean cell Volum 95.5  Platelet count-Automate 329  RBC Count 2.43  Red Cell Distrib Width14.9  WBC Count10.65  % Albumin, Urine--  Hematocrit 23.2  Hemoglobin 7.2    [facility]  07-20-23 @ 08:25  Blood Gas Arterial-Calcium,Ionized--  Blood Urea Nitrogen, Serum 67 mg/dL<HH> [10 - 20] [Critical value:]  Carbon Dioxide, Serum26 mmol/L [17 - 32]  Chloride, Serum98 mmol/L [98 - 110]  Creatinie, Serum2.8 mg/dL<H> [0.7 - 1.5]  Glucose, Omvbi709 mg/dL<H> [70 - 99]  Potassium, Serum4.5 mmol/L [3.5 - 5.0] [Slighty Hemolyzed use with Caution]  Sodium, Serum 135 mmol/L [135 - 146]  [facility]  07-19-23 @ 16:37  Blood Gas Arterial-Calcium,Ionized--  Blood Urea Nitrogen, Serum 59 mg/dL<H> [10 - 20]  Carbon Dioxide, Serum24 mmol/L [17 - 32]  Chloride, Axlvd332 mmol/L [98 - 110]  Creatinie, Serum2.6 mg/dL<H> [0.7 - 1.5]  Glucose, Lnosa262 mg/dL<H> [70 - 99]  Potassium, Serum4.7 mmol/L [3.5 - 5.0]  Sodium, Serum 138 mmol/L [135 - 146]  BMP  07-19-23 @ 07:34  Blood Gas Arterial-Calcium,Ionized--  Blood Urea Nitrogen, Serum 53 mg/dL<H> [10 - 20]  Carbon Dioxide, Serum27 mmol/L [17 - 32]  Chloride, Serum94 mmol/L<L> [98 - 110]  Creatinie, Serum2.4 mg/dL<H> [0.7 - 1.5]  Glucose, Oigwx819 mg/dL<H> [70 - 99]  Potassium, Serum4.3 mmol/L [3.5 - 5.0]  Sodium, Serum 129 mmol/L<L> [135 - 146]  [facility]  07-18-23 @ 11:25  Blood Gas Arterial-Calcium,Ionized--  Blood Urea Nitrogen, Serum 39 mg/dL<H> [10 - 20]  Carbon Dioxide, Serum30 mmol/L [17 - 32]  Chloride, Serum99 mmol/L [98 - 110]  Creatinie, Serum1.7 mg/dL<H> [0.7 - 1.5]  Glucose, Lsfux455 mg/dL<H> [70 - 99]  Potassium, Serum3.7 mmol/L [3.5 - 5.0]  Sodium, Serum 138 mmol/L [135 - 146]  [facility]  07-17-23 @ 10:38  Blood Gas Arterial-Calcium,Ionized--  Blood Urea Nitrogen, Serum 64 mg/dL<HH> [10 - 20] [Critical value:]  Carbon Dioxide, Serum29 mmol/L [17 - 32]  Chloride, Serum98 mmol/L [98 - 110]  Creatinie, Serum2.7 mg/dL<H> [0.7 - 1.5]  Glucose, Jtgew323 mg/dL<H> [70 - 99]  Potassium, Serum4.4 mmol/L [3.5 - 5.0]  Sodium, Serum 137 mmol/L [135 - 146]  [facility]  07-16-23 @ 08:47  Blood Gas Arterial-Calcium,Ionized--  Blood Urea Nitrogen, Serum 55 mg/dL<H> [10 - 20]  Carbon Dioxide, Serum25 mmol/L [17 - 32]  Chloride, Serum99 mmol/L [98 - 110]  Creatinie, Serum2.3 mg/dL<H> [0.7 - 1.5]  Glucose, Lhdqw192 mg/dL<H> [70 - 99]  Potassium, Serum4.9 mmol/L [3.5 - 5.0]  Sodium, Serum 135 mmol/L [135 - 146]              Microbiology:    Culture - Other (collected 07-18-23 @ 16:42)  Source: Wound Wound  Preliminary Report (07-20-23 @ 08:46):    Culture yields growth of greater than 3 colony types of    bacteria,  which may indicate contamination and normal bindu    Call client services within 7 days if further workup is clinically    indicated.    Culture - Fungal, Other (collected 07-05-23 @ 12:20)  Source: .Other Other, RIGHT AXILLARY SWAB  Preliminary Report (07-15-23 @ 15:02):    No fungus isolated at 1 week.        Medications:  acetaminophen     Tablet .. 650 milliGRAM(s) Oral every 6 hours PRN  albuterol/ipratropium for Nebulization 3 milliLiter(s) Nebulizer every 6 hours  aMIOdarone    Tablet 200 milliGRAM(s) Oral daily  aMIOdarone    Tablet   Oral   aspirin  chewable 81 milliGRAM(s) Oral daily  atorvastatin 80 milliGRAM(s) Oral at bedtime  buMETAnide 2 milliGRAM(s) Oral daily  chlorhexidine 0.12% Liquid 15 milliLiter(s) Oral Mucosa two times a day  chlorhexidine 2% Cloths 1 Application(s) Topical <User Schedule>  collagenase Ointment 1 Application(s) Topical two times a day  darbepoetin Injectable Syringe 40 MICROGram(s) IV Push <User Schedule>  dextrose 5%. 1000 milliLiter(s) IV Continuous <Continuous>  dextrose 5%. 1000 milliLiter(s) IV Continuous <Continuous>  dextrose 50% Injectable 12.5 Gram(s) IV Push once  dextrose 50% Injectable 25 Gram(s) IV Push once  dextrose 50% Injectable 25 Gram(s) IV Push once  dextrose Oral Gel 15 Gram(s) Oral once PRN  glucagon  Injectable 1 milliGRAM(s) IntraMuscular once  heparin   Injectable 5000 Unit(s) SubCutaneous every 12 hours  insulin lispro (ADMELOG) corrective regimen sliding scale   SubCutaneous three times a day before meals  levETIRAcetam  Solution 500 milliGRAM(s) Enteral Tube two times a day  pantoprazole   Suspension 40 milliGRAM(s) Oral daily  polyethylene glycol 3350 17 Gram(s) Oral daily  sodium chloride 0.9% Bolus. 100 milliLiter(s) IV Bolus every 5 minutes PRN  sodium chloride 0.9% Bolus. 100 milliLiter(s) IV Bolus every 5 minutes PRN  sodium zirconium cyclosilicate 5 Gram(s) Oral daily        Assessment and Plan:  62 y/o Male  with  PMH of ICH (2021) s/p trach and PEG, HTN, HLD, T2DM, CAD s/p stents, Recurrent C diff, BIBEMS from [facility] for abnormal labs. Patient non-verbal at baseline . Per NH chart  have a BUN greater than 200 and creatinine of 4.3 on outpatient labs. Outpatient CXR also w/ new L sided pleural effusion. In ED, patient was hypotensive with Afib with RVR, found to be in acute renal failure, anemic with elevated trops. He was started on Amiodarone GTT, PPI GTT, gastric lavage with coffee ground secretions, started on broad spectrum abx and admitted to MICU  While in MICU, course complicated by DKA, s/p insulin drip, MICHELLE started on HD, acute anemia s/p PRBC transfusion, NSTEMI, downgraded to SDU -> floor than downgraded to Vent Unit.    # Acute Renal Failure, started on HD 6/1, as per Renal team rec.   - daily BMP monitoring, daily weight, daily strict I/O chart  - patient is hypotensive- Bumex transitioned to PO 2 mg once daily to avoid symptomatic hypotension  - s/p permanent HD cath. placement on 7/7/23  - Midodrine prn. tx. prior to HD if b/p is borderline low    # anemia of chronic disease - no gross bleeding events  - 7/7; 7/17- s/p 1 unit PRBC, h/h stable  transfuse prn for goal > 7    # Hyperkalemia- corrected , d/c lokelma  # Hypophosphatemia- tx. with Neutra-phos packets x 2 via peg, close outpatient BMP , mg, phos monitoring     # Hypotension- d/c Cardizem, continue on Bumex, am serum Cortisol level- 12.3       # chronic respiratory failure, vent dependant via trach , trach care,  Vent management per Pulm. team   # Chronic dysphagia, sp peg, peg care   #Candiduria with U cx C tropicalis    # Afib w/ RVR - now rate controlled # Acute Decompensated CHF  -continued on Amiodarone tx, rate controlled ,no a/c tx due to anemia requiring blood transfusions     CAD s/p stents  NSTEMI, medical management   # DKA, resolved  # Hx ICH   Bedbound from NH   - c/w keppra 500mg BID for seizure prophylaxis  # multiple sacral wounds. sacral and buttock , further wound care as per Wound care specialist report, frequent body position change, decub. prevention tx.     # hx of ICH, chronic non-verbal , quadriplegia - continue daily care.     # h/o DM 2- continue bsfs monitoring  with insulin sliding scale co.    DVT proph: Heparin subc. tx.     #Progress Note Handoff:  Patient remains with very poor overall prognosis ,  HD tx. as per renal team, d/c to SNF  Family discussion: yes, medical team Disposition: SNF today    Total time spent to complete patient's bedside assessment, review medical chart, discuss discharge  plan of care with covering medical team was more than 35 minutes with >50% of time spent face to face with patient, discussion with patient/family and/or coordination of care

## 2023-07-20 NOTE — PROGRESS NOTE ADULT - ASSESSMENT
IMPRESSION:    Sepsis present on admission resolved  Candida UTI sp tx  Pseudomonas PNA sp abx  anemia sp tx/ no active bleed  left side atelectasis improved  Bilateral pleural effusions likely volume overload   MICHELLE on CKD III on dialysis   NSTEMI likely type 2  Hyponatremia resolved   Paroxysmal Afib  Acute on chronic anemia suspected UGI Bleed  Chronic respiratory failure  H/o ICH s/p trach and PEG  H/o CAD      PLAN:    CNS: Avoid CNS depressants     HEENT: Oral and trach care    PULMONARY: HOB 45. Aspiration.  No vent changes.  Goal saturation 92-96%. Vent dependent.      CARDIOVASCULAR: Avoid overload.      GI:  Feeding  Bowel regimen     RENAL: trend CMP.  Correct as needed.  Nephrology following. HD per renal     INFECTIOUS DISEASE: ABX per ID.      HEMATOLOGICAL: DVT prophylaxis. fu cbc noted    ENDOCRINE: Follow up FS. Insulin protocol if needed.    DNR    Poor prognosis

## 2023-07-20 NOTE — PROGRESS NOTE ADULT - REASON FOR ADMISSION
Abnormal labs

## 2023-07-20 NOTE — DISCHARGE NOTE NURSING/CASE MANAGEMENT/SOCIAL WORK - NSDCPEFALRISK_GEN_ALL_CORE
For information on Fall & Injury Prevention, visit: [email or web address] OR  [email or web address] OR  https://www.cdc.gov/steadi/patient.html

## 2023-07-20 NOTE — PROGRESS NOTE ADULT - PROVIDER SPECIALTY LIST ADULT
Hospitalist
Internal Medicine
Nephrology
Nutrition Support
Nutrition Support
Pulmonology
Hospitalist
Internal Medicine
Nephrology
Nutrition Support
Pulmonology
Wound Care
Critical Care
Critical Care
Hospitalist
Internal Medicine
Nephrology
Nutrition Support
Pulmonology
Critical Care
Critical Care
Hospitalist
Internal Medicine
Nephrology
Nutrition Support
Nutrition Support
Pulmonology
Internal Medicine
Palliative Care
Infectious Disease
Internal Medicine
Internal Medicine
Palliative Care

## 2023-07-26 NOTE — ED PROVIDER NOTE - CARE PLAN
1 Principal Discharge DX:	Hypoglycemia  Secondary Diagnosis:	Seizure  Secondary Diagnosis:	Anemia  Secondary Diagnosis:	MICHELLE (acute kidney injury)

## 2023-07-26 NOTE — ED ADULT TRIAGE NOTE - CHIEF COMPLAINT QUOTE
He's from [facility], they called for low hemoglobin, 6.4. He's on a vent, non verbal - EMS  UTO height or weight, unknown patient baseline, paperwork states patient received 1st dose Venofer today.

## 2023-07-26 NOTE — ED PROVIDER NOTE - CRITICAL CARE ATTENDING CONTRIBUTION TO CARE
63 y.o. 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 Pt had rountine labs in NH and was found to have Hb of 6.4. Pt is unable to provide history due to mental status.   On exam, pt is unresponsive, PEERL, neck (+) trach, lungs CTA B/L, CV S1S2 regular, abdomen soft/NT/ND/(+)BS/(+) PEG.     1. Anemia- will transfuse.  2. Pt found to be hypolycemic. D50 ordered with improvement. Will continue to monitor.  3. Pt started to have a seizure in the ED. Ativan/Keppra ordered. Propofol to be ordered by ICU team who are at bedside. NeuroCrit consult placed.   4. Acute on chronic renal insufficiency- pt is dehydrated. Will give IVF and reevaluate.     Will admit pt to ICU.

## 2023-07-26 NOTE — ED PROVIDER NOTE - PROGRESS NOTE DETAILS
Patient is admitted for hypoglycemia, seizure, MICHELLE, and anemia. Pt has been endorsed to ICU fellow and resident.

## 2023-07-26 NOTE — ED PROVIDER NOTE - OBJECTIVE STATEMENT
63-year-old male with a past medical history of hyperlipidemia, diabetes, hypertension, intracranial hemorrhage, and is trach/PEG who was brought in from nursing home for anemia.  Blood work was done at nursing home and found out that hemoglobin was 6.4, so patient was brought in to the ED for transfusion.  Rest of the HPI limited.

## 2023-07-26 NOTE — ED ADULT NURSE NOTE - CHIEF COMPLAINT QUOTE
He's from [facility], they called for low hemoglobin, 6.4. He's on a vent, non verbal - EMS  UTO height or weight, unknown patient baseline, paperwork states patient received 1st dose Venofer today.

## 2023-07-26 NOTE — ED PROVIDER NOTE - PHYSICAL EXAMINATION
CONSTITUTIONAL: in no apparent distress.   HEAD: Normocephalic; atraumatic.   ENT: Hearing is intact with good acuity to spoken voice.  Patient is speaking clearly, not muffled and airway is intact.   RESPIRATORY: No signs of respiratory distress. Lung sounds are clear in all lobes bilaterally without rales, rhonchi, or wheezes.  CARDIOVASCULAR: Regular rate and rhythm.   GI: Abdomen is soft, non-tender, and without distention. Bowel sounds are present and normoactive in all four quadrants. No masses are noted.   Rectal: Chaperone: GRABIEL Castro. No bright red blood or melena noticed.  MS: Normal appearance and ROM in all four extremities. No tenderness to palpation and distal pulses are normal. Sensation to the upper and lower extremities is normal bilaterally. ***Steady gait noted.  NEURO: A & O x 0

## 2023-07-26 NOTE — ED ADULT NURSE NOTE - NSFALLUNIVINTERV_ED_ALL_ED
Bed/Stretcher in lowest position, wheels locked, appropriate side rails in place/Call bell, personal items and telephone in reach/Instruct patient to call for assistance before getting out of bed/chair/stretcher/Non-slip footwear applied when patient is off stretcher/[place] to call system/Physically safe environment - no spills, clutter or unnecessary equipment/Purposeful proactive rounding/Room/bathroom lighting operational, light cord in reach

## 2023-07-27 PROBLEM — Z98.890 OTHER SPECIFIED POSTPROCEDURAL STATES: Chronic | Status: ACTIVE | Noted: 2023-01-01

## 2023-07-27 PROBLEM — E78.5 HYPERLIPIDEMIA, UNSPECIFIED: Chronic | Status: ACTIVE | Noted: 2023-01-01

## 2023-07-27 PROBLEM — Z93.1 GASTROSTOMY STATUS: Chronic | Status: ACTIVE | Noted: 2023-01-01

## 2023-07-27 PROBLEM — Z86.79 PERSONAL HISTORY OF OTHER DISEASES OF THE CIRCULATORY SYSTEM: Chronic | Status: ACTIVE | Noted: 2023-01-01

## 2023-07-27 NOTE — CONSULT NOTE ADULT - SUBJECTIVE AND OBJECTIVE BOX
UROLOGY CONSULT:   Pt is a 64yo M a/w anemia and seizures;  consulted for assistance with straight cath/ teixeira catheter placement-- needed for urine sample and I&O's. Unable to obtain subjective information from patient.     PAST MEDICAL & SURGICAL HISTORY:  HTN (hypertension)    Diabetes mellitus    H/O intracranial hemorrhage    H/O tracheostomy    PEG (percutaneous endoscopic gastrostomy) status    Hyperlipidemia        MEDICATIONS  (STANDING):  albuterol/ipratropium for Nebulization 3 milliLiter(s) Nebulizer every 6 hours  amikacin  IVPB 500 milliGRAM(s) IV Intermittent once  aMIOdarone    Tablet 200 milliGRAM(s) Oral daily  ascorbic acid 500 milliGRAM(s) Oral daily  buMETAnide 1 milliGRAM(s) Oral daily  chlorhexidine 0.12% Liquid 15 milliLiter(s) Oral Mucosa every 12 hours  collagenase Ointment 1 Application(s) Topical two times a day  dextrose 10%. 1000 milliLiter(s) (75 mL/Hr) IV Continuous <Continuous>  doxazosin 4 milliGRAM(s) Oral at bedtime  epoetin guanakito-epbx (RETACRIT) Injectable 42359 Unit(s) SubCutaneous every 7 days  ergocalciferol Drops 4000 Unit(s) Oral every 24 hours  ferrous    sulfate Liquid 300 milliGRAM(s) Enteral Tube daily  folic acid 1 milliGRAM(s) Oral daily  glucagon  Injectable 1 milliGRAM(s) IV Push once  lactulose Syrup 20 Gram(s) Oral every 8 hours  multivitamin 1 Tablet(s) Oral daily  norepinephrine Infusion 0.05 MICROgram(s)/kG/Min (8.28 mL/Hr) IV Continuous <Continuous>  pantoprazole  Injectable 40 milliGRAM(s) IV Push two times a day  polyethylene glycol 3350 17 Gram(s) Oral daily  propofol Infusion. 50 MICROgram(s)/kG/Min (26.5 mL/Hr) IV Continuous <Continuous>  sodium chloride 0.9% Bolus 1000 milliLiter(s) IV Bolus once  valproate sodium  IVPB 500 milliGRAM(s) IV Intermittent two times a day  valproate sodium  IVPB 3000 milliGRAM(s) IV Intermittent once  vancomycin    Solution 125 milliGRAM(s) Oral daily  vancomycin  IVPB 1000 milliGRAM(s) IV Intermittent every 24 hours    MEDICATIONS  (PRN):  acetaminophen     Tablet .. 650 milliGRAM(s) Oral every 6 hours PRN Temp greater or equal to 38C (100.4F), Mild Pain (1 - 3)  aluminum hydroxide/magnesium hydroxide/simethicone Suspension 30 milliLiter(s) Oral every 4 hours PRN Dyspepsia  melatonin 3 milliGRAM(s) Oral at bedtime PRN Insomnia      Allergies  penicillin (Other)      SOCIAL HISTORY: No illicit drug use    FAMILY HISTORY:      REVIEW OF SYSTEMS   [X] Due to altered mental status/intubation, subjective information were not able to be obtained from patient. History was obtained, to the extent possible, from review of the chart and collateral sources of information.    Vital Signs Last 24 Hrs  T(C): 36.7 (27 Jul 2023 01:16), Max: 36.7 (27 Jul 2023 01:16)  T(F): 98.1 (27 Jul 2023 01:16), Max: 98.1 (27 Jul 2023 01:16)  HR: 83 (27 Jul 2023 03:15) (83 - 89)  BP: 94/55 (27 Jul 2023 03:15) (89/49 - 179/102)  BP(mean): 69 (27 Jul 2023 03:15) (64 - 71)  RR: 9 (27 Jul 2023 03:15) (9 - 21)  SpO2: 100% (27 Jul 2023 03:15) (99% - 100%)      PHYSICAL EXAM:  GEN: Pt on vent  RESP:  +trach in place, on vent  NECK: +trach in place, on vent  : Uncircumcised male, ++penoscrotal edema; 12Fr coude placed with return of yellow urine        LABS:                        7.2    11.14 )-----------( 420      ( 26 Jul 2023 21:00 )             22.5     07-26    134<L>  |  93<L>  |  98<HH>  ----------------------------<  10<LL>  3.7   |  28  |  3.4<H>    Ca    9.4      26 Jul 2023 21:00    TPro  6.2  /  Alb  2.5<L>  /  TBili  0.3  /  DBili  x   /  AST  16  /  ALT  12  /  AlkPhos  153<H>  07-26

## 2023-07-27 NOTE — CONSULT NOTE ADULT - CRITICAL CARE ATTENDING COMMENT
multifactorial encephalopathy  seizure  VPS, subdural collection      c/w VPA, vEEG  repeat CTH in early AM  nsgy consultation    recs as above

## 2023-07-27 NOTE — EEG REPORT - NS EEG TEXT BOX
Epilepsy Attending Note:     ALICIA BARBOUR    63y Male  MRN [id number]    Vital Signs Last 24 Hrs  T(C): 36.2 (27 Jul 2023 08:00), Max: 36.7 (27 Jul 2023 01:16)  T(F): 97.2 (27 Jul 2023 08:00), Max: 98.1 (27 Jul 2023 01:16)  HR: 74 (27 Jul 2023 11:00) (72 - 89)  BP: 89/54 (27 Jul 2023 11:00) (88/54 - 179/102)  BP(mean): 67 (27 Jul 2023 11:00) (64 - 77)  RR: 14 (27 Jul 2023 08:00) (6 - 21)  SpO2: 100% (27 Jul 2023 11:00) (99% - 100%)    Parameters below as of 27 Jul 2023 11:00  Patient On (Oxygen Delivery Method): ventilator    O2 Concentration (%): 40                          7.1    11.37 )-----------( 395      ( 27 Jul 2023 05:33 )             22.3       07-27    131<L>  |  92<L>  |  105<HH>  ----------------------------<  43<LL>  3.3<L>   |  28  |  3.6<H>    Ca    9.2      27 Jul 2023 05:33  Phos  3.0     07-27  Mg     2.7     07-27    TPro  5.7<L>  /  Alb  2.2<L>  /  TBili  0.3  /  DBili  x   /  AST  16  /  ALT  12  /  AlkPhos  125<H>  07-27      MEDICATIONS  (STANDING):  albuterol/ipratropium for Nebulization 3 milliLiter(s) Nebulizer every 6 hours  aMIOdarone    Tablet 200 milliGRAM(s) Oral daily  ascorbic acid 500 milliGRAM(s) Oral daily  aspirin  chewable 81 milliGRAM(s) Oral daily  atorvastatin 80 milliGRAM(s) Oral at bedtime  buMETAnide 1 milliGRAM(s) Oral daily  chlorhexidine 0.12% Liquid 15 milliLiter(s) Oral Mucosa every 12 hours  chlorhexidine 2% Cloths 1 Application(s) Topical <User Schedule>  collagenase Ointment 1 Application(s) Topical two times a day  dextrose 10%. 1000 milliLiter(s) (75 mL/Hr) IV Continuous <Continuous>  doxazosin 4 milliGRAM(s) Oral at bedtime  epoetin guanakito-epbx (RETACRIT) Injectable 34327 Unit(s) SubCutaneous every 7 days  ergocalciferol Drops 4000 Unit(s) Oral every 24 hours  ferrous    sulfate Liquid 300 milliGRAM(s) Enteral Tube daily  folic acid 1 milliGRAM(s) Oral daily  glucagon  Injectable 1 milliGRAM(s) IV Push once  lactulose Syrup 20 Gram(s) Oral every 8 hours  multivitamin 1 Tablet(s) Oral daily  norepinephrine Infusion 0.05 MICROgram(s)/kG/Min (8.28 mL/Hr) IV Continuous <Continuous>  pantoprazole  Injectable 40 milliGRAM(s) IV Push two times a day  polyethylene glycol 3350 17 Gram(s) Oral daily  potassium chloride  20 mEq/100 mL IVPB 20 milliEquivalent(s) IV Intermittent every 2 hours  propofol Infusion. 50 MICROgram(s)/kG/Min (26.5 mL/Hr) IV Continuous <Continuous>  valproate sodium  IVPB 500 milliGRAM(s) IV Intermittent two times a day    MEDICATIONS  (PRN):  acetaminophen     Tablet .. 650 milliGRAM(s) Oral every 6 hours PRN Temp greater or equal to 38C (100.4F), Mild Pain (1 - 3)  aluminum hydroxide/magnesium hydroxide/simethicone Suspension 30 milliLiter(s) Oral every 4 hours PRN Dyspepsia  melatonin 3 milliGRAM(s) Oral at bedtime PRN Insomnia            VEEG in the last 24 hours:    Background-----------continues, very low amplitude reaching 3-4 hz  showing mild reactivity and also evidence for sleep patterns    Focal and generalized slowing------1-moderate to severe generalized slowing - 2- moderate right > left frontal/FC slowing    Interictal activity----runs of bilateral FP ( higher amplitude from the right) rhythmic activity that appears not be physiological . ( ? of artifact). It appears as questonable low amplitude spike and aftergoing slow with modified morphology( ? possiblity of change due the the prior procedure)    Events------none    Seizures-----none    Impression: abnormal as above    Plan - as/NCC team.  Discussed to consider Ativan challenge

## 2023-07-27 NOTE — CONSULT NOTE ADULT - SUBJECTIVE AND OBJECTIVE BOX
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    Blood work was done at nursing home and found out that hemoglobin was 6.4. Patient was recently started on Glimeperide at the NH.  No sign recent fevers, bleeding per PEG, thickening of the sputum or hematochezia.    In the ED the patient was hemodynamically unstable and in status epilepticus.  Versed, Ativan, keppra and propofol were given in the ED and patient was started on Valproic acid as per NeuroCrit team  MAP less than 65 patient was started on Levophed after central line insertion  Labs were significant for mild leukocytosis, severe hypoglycemia and Hemoglobin 7,2    Patient admitted for status epilepticus secondary to hypoglycemia and possible sepsis (27 Jul 2023 01:00)    NUTRITION SUPPORT NOTE:  pt known to us from previous admissions    REVIEW OF SYSTEMS:  Negative except as noted above.     PAST MEDICAL/SURGICAL HISTORY:   HTN (hypertension)    Diabetes mellitus    H/O intracranial hemorrhage    H/O tracheostomy    PEG (percutaneous endoscopic gastrostomy) status    Hyperlipidemia        ALLERGIES:  penicillin (Other)      VITALS:  T(F): 97.1 (07-27 @ 04:00), Max: 98.1 (07-27 @ 01:16)  HR: 78 (07-27 @ 07:00) (78 - 89)  BP: 88/54 (07-27 @ 07:00) (88/54 - 120/60)  RR: 14 (07-27 @ 07:00) (6 - 21)  SpO2: 100% (07-27 @ 07:00) (99% - 100%)    HEIGHT/WEIGHT/BMI:   Height (cm): 172.7 (07-27), 170.2 (06-01), 180.3 (10-04)  Weight (kg): 93.7 (07-27), 89.4 (07-17), 66.4 (10-04)  BMI (kg/m2): 31.4 (07-27), 30.9 (07-17), 22.9 (06-01), 20.4 (10-04)    I/Os:     07-26-23 @ 07:01  -  07-27-23 @ 07:00  --------------------------------------------------------  IN:    dextrose 10%: 525 mL    IV PiggyBack: 50 mL    Norepinephrine: 79.5 mL    Propofol (Status Epilepticus): 185.5 mL  Total IN: 840 mL    OUT:    Rectal Tube (mL): 100 mL    Voided (mL): 100 mL  Total OUT: 200 mL    Total NET: 640 mL          PHYSICAL EXAM:   GENERAL: NAD, well-groomed, well-developed  HEENT: Moist mucous membranes, Good dentition, No lesions  ABDOMEN: Soft, Nontender, Nondistended  EXTREMITIES:  No clubbing, cyanosis, or edema  SKIN: warm and well perfused; No obvious rashes or lesions  IV ACCESS:   ENTERAL ACCESS:     STANDING MEDICATIONS:   albuterol/ipratropium for Nebulization 3 milliLiter(s) Nebulizer every 6 hours  amikacin  IVPB 500 milliGRAM(s) IV Intermittent once  aMIOdarone    Tablet 200 milliGRAM(s) Oral daily  ascorbic acid 500 milliGRAM(s) Oral daily  buMETAnide 1 milliGRAM(s) Oral daily  chlorhexidine 0.12% Liquid 15 milliLiter(s) Oral Mucosa every 12 hours  chlorhexidine 2% Cloths 1 Application(s) Topical <User Schedule>  collagenase Ointment 1 Application(s) Topical two times a day  dextrose 10%. 1000 milliLiter(s) IV Continuous <Continuous>  doxazosin 4 milliGRAM(s) Oral at bedtime  epoetin guanakito-epbx (RETACRIT) Injectable 65221 Unit(s) SubCutaneous every 7 days  ergocalciferol Drops 4000 Unit(s) Oral every 24 hours  ferrous    sulfate Liquid 300 milliGRAM(s) Enteral Tube daily  folic acid 1 milliGRAM(s) Oral daily  glucagon  Injectable 1 milliGRAM(s) IV Push once  lactulose Syrup 20 Gram(s) Oral every 8 hours  multivitamin 1 Tablet(s) Oral daily  norepinephrine Infusion 0.05 MICROgram(s)/kG/Min IV Continuous <Continuous>  pantoprazole  Injectable 40 milliGRAM(s) IV Push two times a day  polyethylene glycol 3350 17 Gram(s) Oral daily  propofol Infusion. 50 MICROgram(s)/kG/Min IV Continuous <Continuous>  sodium chloride 0.9% Bolus 1000 milliLiter(s) IV Bolus once  valproate sodium  IVPB 500 milliGRAM(s) IV Intermittent two times a day  vancomycin    Solution 125 milliGRAM(s) Oral daily  vancomycin  IVPB 1000 milliGRAM(s) IV Intermittent every 24 hours      LABS:                         7.1    11.37 )-----------( 395 ( 27 Jul 2023 05:33 )             22.3     131<L>  |  92<L>  |  105<HH>  ----------------------------<  43<LL>          (07-27-23 @ 05:33)  3.3<L>   |  28  |  3.6<H>    Ca    9.2          (07-27-23 @ 05:33)  Phos  3.0         (07-27-23 @ 05:33)  Mg     2.7         (07-27-23 @ 05:33)    TPro  5.7<L>  /  Alb  2.2<L>  /  TBili  0.3  /  DBili  x   /  AST  16  /  ALT  12  /  AlkPhos  125<H>       07-27-23 @ 05:33      Triglycerides, Serum: 37 mg/dL (07-27 @ 05:33)    Prealbumin, Serum:   Vitamin D, 25-Hydroxy: 70 ng/mL (06-03 @ 06:50)    Folate: >20.0 ng/mL (06-03 @ 06:50)    Vitamin B12, Serum: 1908 pg/mL (06-06 @ 10:40)    Zinc Level, Plasma: 54 ug/dL (06-03 @ 06:50)    CRP:   A1c: 6.2 % (05-31-23 @ 05:50)      Blood Glucose (Past 24 hours):  86 mg/dL (07-27 @ 06:31)  98 mg/dL (07-27 @ 05:27)  62 mg/dL (07-27 @ 04:11)  76 mg/dL (07-27 @ 03:01)  36 mg/dL (07-27 @ 01:51)  58 mg/dL (07-27 @ 00:06)  67 mg/dL (07-26 @ 23:00)      DIET:   Diet, NPO:   Except Medications (07-27-23 @ 06:27) [Active]    RADIOLOGY:   < from: Xray Chest 1 View-PORTABLE IMMEDIATE (Xray Chest 1 View-PORTABLE IMMEDIATE .) (07.27.23 @ 00:46) >  Unchanged tracheostomy and right dialysis catheter.  shunt.    Increased right effusion and diffuse hazy right lung opacities. Mild left   basal pleural-parenchymal opacity. No pneumothorax.    Stable cardiomediastinal silhouette.    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    Blood work was done at nursing home and found out that hemoglobin was 6.4. Patient was recently started on Glimeperide at the NH.  No sign recent fevers, bleeding per PEG, thickening of the sputum or hematochezia.    In the ED the patient was hemodynamically unstable and in status epilepticus.  Versed, Ativan, keppra and propofol were given in the ED and patient was started on Valproic acid as per NeuroCrit team  MAP less than 65 patient was started on Levophed after central line insertion  Labs were significant for mild leukocytosis, severe hypoglycemia and Hemoglobin 7,2    Patient admitted for status epilepticus secondary to hypoglycemia and possible sepsis (27 Jul 2023 01:00)    NUTRITION SUPPORT NOTE:  pt known to us from previous admissions    REVIEW OF SYSTEMS:  Negative except as noted above.     PAST MEDICAL/SURGICAL HISTORY:   HTN (hypertension)  Diabetes mellitus  H/O intracranial hemorrhage  H/O tracheostomy  PEG (percutaneous endoscopic gastrostomy) status  Hyperlipidemia  ALLERGIES:  penicillin (Other)  VITALS:  T(F): 97.1 (07-27 @ 04:00), Max: 98.1 (07-27 @ 01:16)  HR: 78 (07-27 @ 07:00) (78 - 89)  BP: 88/54 (07-27 @ 07:00) (88/54 - 120/60)  RR: 14 (07-27 @ 07:00) (6 - 21)  SpO2: 100% (07-27 @ 07:00) (99% - 100%)  ABG - ( 27 Jul 2023 02:45 )  pH, Arterial: 7.40  pH, Blood: x     /  pCO2: 45    /  pO2: 127   / HCO3: 28    / Base Excess: 2.5   /  SaO2: 98.7    Mode: AC/ CMV (Assist Control/ Continuous Mandatory Ventilation)  RR (machine): 14  TV (machine): 450  FiO2: 40  PEEP: 8  ITime: 1  MAP: 12  PIP: 24  HEIGHT/WEIGHT/BMI:   Height (cm): 172.7 (07-27), 170.2 (06-01), 180.3 (10-04)  Weight (kg): 93.7 (07-27), 89.4 (07-17), 66.4 (10-04)  BMI (kg/m2): 31.4 (07-27), 30.9 (07-17), 22.9 (06-01), 20.4 (10-04)  07-26-23 @ 07:01  -  07-27-23 @ 07:00  --------------------------------------------------------  IN:    dextrose 10%: 525 mL    IV PiggyBack: 50 mL    Norepinephrine: 79.5 mL    Propofol (Status Epilepticus): 185.5 mL  Total IN: 840 mL    OUT:    Rectal Tube (mL): 100 mL    Voided (mL): 100 mL  Total OUT: 200 mL    Total NET: 640 mL  PHYSICAL EXAM:   GENERAL: vented via trach  HEENT: Moist mucous membranes  ABDOMEN: Soft, N/T N/D +trach in place  EXTREMITIES:  No edema  SKIN: warm and well perfused; No obvious rashes or lesions  IV ACCESS:   ENTERAL ACCESS: NPO    STANDING MEDICATIONS:   albuterol/ipratropium for Nebulization 3 milliLiter(s) Nebulizer every 6 hours  amikacin  IVPB 500 milliGRAM(s) IV Intermittent once  aMIOdarone    Tablet 200 milliGRAM(s) Oral daily  ascorbic acid 500 milliGRAM(s) Oral daily  buMETAnide 1 milliGRAM(s) Oral daily  chlorhexidine 0.12% Liquid 15 milliLiter(s) Oral Mucosa every 12 hours  chlorhexidine 2% Cloths 1 Application(s) Topical <User Schedule>  collagenase Ointment 1 Application(s) Topical two times a day  dextrose 10%. 1000 milliLiter(s) IV Continuous <Continuous>  doxazosin 4 milliGRAM(s) Oral at bedtime  epoetin guanakito-epbx (RETACRIT) Injectable 17731 Unit(s) SubCutaneous every 7 days  ergocalciferol Drops 4000 Unit(s) Oral every 24 hours  ferrous    sulfate Liquid 300 milliGRAM(s) Enteral Tube daily  folic acid 1 milliGRAM(s) Oral daily  glucagon  Injectable 1 milliGRAM(s) IV Push once  lactulose Syrup 20 Gram(s) Oral every 8 hours  multivitamin 1 Tablet(s) Oral daily  norepinephrine Infusion 0.05 MICROgram(s)/kG/Min IV Continuous <Continuous>  pantoprazole  Injectable 40 milliGRAM(s) IV Push two times a day  polyethylene glycol 3350 17 Gram(s) Oral daily  propofol Infusion. 50 MICROgram(s)/kG/Min IV Continuous <Continuous>  sodium chloride 0.9% Bolus 1000 milliLiter(s) IV Bolus once  valproate sodium  IVPB 500 milliGRAM(s) IV Intermittent two times a day  vancomycin    Solution 125 milliGRAM(s) Oral daily  vancomycin  IVPB 1000 milliGRAM(s) IV Intermittent every 24 hours      LABS:                         7.1    11.37 )-----------( 395 ( 27 Jul 2023 05:33 )             22.3     131<L>  |  92<L>  |  105<HH>  ----------------------------<  43<LL>          (07-27-23 @ 05:33)  3.3<L>   |  28  |  3.6<H>    Ca    9.2          (07-27-23 @ 05:33)  Phos  3.0         (07-27-23 @ 05:33)  Mg     2.7         (07-27-23 @ 05:33)    TPro  5.7<L>  /  Alb  2.2<L>  /  TBili  0.3  /  DBili  x   /  AST  16  /  ALT  12  /  AlkPhos  125<H>       07-27-23 @ 05:33  Triglycerides, Serum: 37 mg/dL (07-27 @ 05:33)  Vitamin D, 25-Hydroxy: 70 ng/mL (06-03 @ 06:50)  Folate: >20.0 ng/mL (06-03 @ 06:50)  Vitamin B12, Serum: 1908 pg/mL (06-06 @ 10:40)  Zinc Level, Plasma: 54 ug/dL (06-03 @ 06:50)  A1c: 6.2 % (05-31-23 @ 05:50)  Blood Glucose (Past 24 hours):  86 mg/dL (07-27 @ 06:31)  98 mg/dL (07-27 @ 05:27)  62 mg/dL (07-27 @ 04:11)  DIET:   Diet, NPO:   Except Medications (07-27-23 @ 06:27) [Active]  RADIOLOGY:   < from: Xray Chest 1 View-PORTABLE IMMEDIATE (Xray Chest 1 View-PORTABLE IMMEDIATE .) (07.27.23 @ 00:46) >  Unchanged tracheostomy and right dialysis catheter.  shunt.  Increased right effusion and diffuse hazy right lung opacities. Mild left   basal pleural-parenchymal opacity. No pneumothorax.  Stable cardiomediastinal silhouette.   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    Blood work was done at nursing home and found out that hemoglobin was 6.4. Patient was recently started on Glimeperide at the NH.  No sign recent fevers, bleeding per PEG, thickening of the sputum or hematochezia.    In the ED the patient was hemodynamically unstable and in status epilepticus.  Versed, Ativan, keppra and propofol were given in the ED and patient was started on Valproic acid as per NeuroCrit team  MAP less than 65 patient was started on Levophed after central line insertion  Labs were significant for mild leukocytosis, severe hypoglycemia and Hemoglobin 7,2    Patient admitted for status epilepticus secondary to hypoglycemia and possible sepsis (27 Jul 2023 01:00)    NUTRITION SUPPORT NOTE:  pt known to us from previous admissions  vented via trach  sedated on propofol dosae~600.6 kcal /d  on pressors  nephrology f/u noted   REVIEW OF SYSTEMS:  Negative except as noted above.     PAST MEDICAL/SURGICAL HISTORY:   HTN (hypertension)  Diabetes mellitus  H/O intracranial hemorrhage  H/O tracheostomy  PEG (percutaneous endoscopic gastrostomy) status  Hyperlipidemia  ALLERGIES:  penicillin (Other)  VITALS:  T(F): 97.1 (07-27 @ 04:00), Max: 98.1 (07-27 @ 01:16)  HR: 78 (07-27 @ 07:00) (78 - 89)  BP: 88/54 (07-27 @ 07:00) (88/54 - 120/60)  RR: 14 (07-27 @ 07:00) (6 - 21)  SpO2: 100% (07-27 @ 07:00) (99% - 100%)  ABG - ( 27 Jul 2023 02:45 )  pH, Arterial: 7.40  pH, Blood: x     /  pCO2: 45    /  pO2: 127   / HCO3: 28    / Base Excess: 2.5   /  SaO2: 98.7    Mode: AC/ CMV (Assist Control/ Continuous Mandatory Ventilation)  RR (machine): 14  TV (machine): 450  FiO2: 40  PEEP: 8  ITime: 1  MAP: 12  PIP: 24  HEIGHT/WEIGHT/BMI:   Height (cm): 172.7 (07-27), 170.2 (06-01), 180.3 (10-04)  Weight (kg): 93.7 (07-27), 89.4 (07-17), 66.4 (10-04)  BMI (kg/m2): 31.4 (07-27), 30.9 (07-17), 22.9 (06-01), 20.4 (10-04)  07-26-23 @ 07:01  -  07-27-23 @ 07:00  --------------------------------------------------------  IN:    dextrose 10%: 525 mL    IV PiggyBack: 50 mL    Norepinephrine: 79.5 mL    Propofol (Status Epilepticus): 185.5 mL  Total IN: 840 mL    OUT:    Rectal Tube (mL): 100 mL    Voided (mL): 100 mL  Total OUT: 200 mL    Total NET: 640 mL  PHYSICAL EXAM:   GENERAL: vented via trach  HEENT: Moist mucous membranes  ABDOMEN: Soft, N/T N/D +trach in place  EXTREMITIES:  No edema  SKIN: warm and well perfused; No obvious rashes or lesions  IV ACCESS:   ENTERAL ACCESS: NPO    STANDING MEDICATIONS:   albuterol/ipratropium for Nebulization 3 milliLiter(s) Nebulizer every 6 hours  amikacin  IVPB 500 milliGRAM(s) IV Intermittent once  aMIOdarone    Tablet 200 milliGRAM(s) Oral daily  ascorbic acid 500 milliGRAM(s) Oral daily  buMETAnide 1 milliGRAM(s) Oral daily  chlorhexidine 0.12% Liquid 15 milliLiter(s) Oral Mucosa every 12 hours  chlorhexidine 2% Cloths 1 Application(s) Topical <User Schedule>  collagenase Ointment 1 Application(s) Topical two times a day  dextrose 10%. 1000 milliLiter(s) IV Continuous <Continuous>  doxazosin 4 milliGRAM(s) Oral at bedtime  epoetin guanakito-epbx (RETACRIT) Injectable 73721 Unit(s) SubCutaneous every 7 days  ergocalciferol Drops 4000 Unit(s) Oral every 24 hours  ferrous    sulfate Liquid 300 milliGRAM(s) Enteral Tube daily  folic acid 1 milliGRAM(s) Oral daily  glucagon  Injectable 1 milliGRAM(s) IV Push once  lactulose Syrup 20 Gram(s) Oral every 8 hours  multivitamin 1 Tablet(s) Oral daily  norepinephrine Infusion 0.05 MICROgram(s)/kG/Min IV Continuous <Continuous>  pantoprazole  Injectable 40 milliGRAM(s) IV Push two times a day  polyethylene glycol 3350 17 Gram(s) Oral daily  propofol Infusion. 50 MICROgram(s)/kG/Min IV Continuous <Continuous>  sodium chloride 0.9% Bolus 1000 milliLiter(s) IV Bolus once  valproate sodium  IVPB 500 milliGRAM(s) IV Intermittent two times a day  vancomycin    Solution 125 milliGRAM(s) Oral daily  vancomycin  IVPB 1000 milliGRAM(s) IV Intermittent every 24 hours      LABS:                         7.1    11.37 )-----------( 395      ( 27 Jul 2023 05:33 )             22.3     131<L>  |  92<L>  |  105<HH>  ----------------------------<  43<LL>          (07-27-23 @ 05:33)  3.3<L>   |  28  |  3.6<H>    Ca    9.2          (07-27-23 @ 05:33)  Phos  3.0         (07-27-23 @ 05:33)  Mg     2.7         (07-27-23 @ 05:33)    TPro  5.7<L>  /  Alb  2.2<L>  /  TBili  0.3  /  DBili  x   /  AST  16  /  ALT  12  /  AlkPhos  125<H>       07-27-23 @ 05:33  Triglycerides, Serum: 37 mg/dL (07-27 @ 05:33)  Vitamin D, 25-Hydroxy: 70 ng/mL (06-03 @ 06:50)  Folate: >20.0 ng/mL (06-03 @ 06:50)  Vitamin B12, Serum: 1908 pg/mL (06-06 @ 10:40)  Zinc Level, Plasma: 54 ug/dL (06-03 @ 06:50)  A1c: 6.2 % (05-31-23 @ 05:50)  Blood Glucose (Past 24 hours):  86 mg/dL (07-27 @ 06:31)  98 mg/dL (07-27 @ 05:27)  62 mg/dL (07-27 @ 04:11)  DIET:   Diet, NPO:   Except Medications (07-27-23 @ 06:27) [Active]  RADIOLOGY:   < from: Xray Chest 1 View-PORTABLE IMMEDIATE (Xray Chest 1 View-PORTABLE IMMEDIATE .) (07.27.23 @ 00:46) >  Unchanged tracheostomy and right dialysis catheter.  shunt.  Increased right effusion and diffuse hazy right lung opacities. Mild left   basal pleural-parenchymal opacity. No pneumothorax.  Stable cardiomediastinal silhouette.   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    Blood work was done at nursing home and found out that hemoglobin was 6.4. Patient was recently started on Glimeperide at the NH.  No sign recent fevers, bleeding per PEG, thickening of the sputum or hematochezia.    In the ED the patient was hemodynamically unstable and in status epilepticus.  Versed, Ativan, keppra and propofol were given in the ED and patient was started on Valproic acid as per NeuroCrit team  MAP less than 65 patient was started on Levophed after central line insertion  Labs were significant for mild leukocytosis, severe hypoglycemia and Hemoglobin 7,2    Patient admitted for status epilepticus secondary to hypoglycemia and possible sepsis (27 Jul 2023 01:00)    NUTRITION SUPPORT NOTE:  pt known to us from previous admissions  vented via trach  sedated on propofol dosae~600.6 kcal /d  on pressors  nephrology f/u noted   REVIEW OF SYSTEMS:  Negative except as noted above.     PAST MEDICAL/SURGICAL HISTORY:   HTN (hypertension)  Diabetes mellitus  H/O intracranial hemorrhage  H/O tracheostomy  PEG (percutaneous endoscopic gastrostomy) status  Hyperlipidemia  ALLERGIES:  penicillin (Other)  VITALS:  T(F): 97.1 (07-27 @ 04:00), Max: 98.1 (07-27 @ 01:16)  HR: 78 (07-27 @ 07:00) (78 - 89)  BP: 88/54 (07-27 @ 07:00) (88/54 - 120/60)  RR: 14 (07-27 @ 07:00) (6 - 21)  SpO2: 100% (07-27 @ 07:00) (99% - 100%)  ABG - ( 27 Jul 2023 02:45 )  pH, Arterial: 7.40  pH, Blood: x     /  pCO2: 45    /  pO2: 127   / HCO3: 28    / Base Excess: 2.5   /  SaO2: 98.7    Mode: AC/ CMV (Assist Control/ Continuous Mandatory Ventilation)  RR (machine): 14  TV (machine): 450  FiO2: 40  PEEP: 8  ITime: 1  MAP: 12  PIP: 24  HEIGHT/WEIGHT/BMI:   Height (cm): 172.7 (07-27), 170.2 (06-01), 180.3 (10-04)  Weight (kg): 93.7 (07-27), 89.4 (07-17), 66.4 (10-04)  BMI (kg/m2): 31.4 (07-27), 30.9 (07-17), 22.9 (06-01), 20.4 (10-04)  07-26-23 @ 07:01  -  07-27-23 @ 07:00  --------------------------------------------------------  IN:    dextrose 10%: 525 mL    IV PiggyBack: 50 mL    Norepinephrine: 79.5 mL    Propofol (Status Epilepticus): 185.5 mL  Total IN: 840 mL    OUT:    Rectal Tube (mL): 100 mL    Voided (mL): 100 mL  Total OUT: 200 mL    Total NET: 640 mL  PHYSICAL EXAM:   GENERAL: vented via trach  HEENT: Moist mucous membranes  ABDOMEN: Soft, N/T N/D +trach in place  EXTREMITIES:  No edema  SKIN: +sacral ulcers  IV ACCESS: left IJ site c/d/i  ENTERAL ACCESS: NPO    STANDING MEDICATIONS:   albuterol/ipratropium for Nebulization 3 milliLiter(s) Nebulizer every 6 hours  amikacin  IVPB 500 milliGRAM(s) IV Intermittent once  aMIOdarone    Tablet 200 milliGRAM(s) Oral daily  ascorbic acid 500 milliGRAM(s) Oral daily  buMETAnide 1 milliGRAM(s) Oral daily  chlorhexidine 0.12% Liquid 15 milliLiter(s) Oral Mucosa every 12 hours  chlorhexidine 2% Cloths 1 Application(s) Topical <User Schedule>  collagenase Ointment 1 Application(s) Topical two times a day  dextrose 10%. 1000 milliLiter(s) IV Continuous <Continuous>  doxazosin 4 milliGRAM(s) Oral at bedtime  epoetin guanakito-epbx (RETACRIT) Injectable 13540 Unit(s) SubCutaneous every 7 days  ergocalciferol Drops 4000 Unit(s) Oral every 24 hours  ferrous    sulfate Liquid 300 milliGRAM(s) Enteral Tube daily  folic acid 1 milliGRAM(s) Oral daily  glucagon  Injectable 1 milliGRAM(s) IV Push once  lactulose Syrup 20 Gram(s) Oral every 8 hours  multivitamin 1 Tablet(s) Oral daily  norepinephrine Infusion 0.05 MICROgram(s)/kG/Min IV Continuous <Continuous>  pantoprazole  Injectable 40 milliGRAM(s) IV Push two times a day  polyethylene glycol 3350 17 Gram(s) Oral daily  propofol Infusion. 50 MICROgram(s)/kG/Min IV Continuous <Continuous>  sodium chloride 0.9% Bolus 1000 milliLiter(s) IV Bolus once  valproate sodium  IVPB 500 milliGRAM(s) IV Intermittent two times a day  vancomycin    Solution 125 milliGRAM(s) Oral daily  vancomycin  IVPB 1000 milliGRAM(s) IV Intermittent every 24 hours      LABS:                         7.1    11.37 )-----------( 395      ( 27 Jul 2023 05:33 )             22.3     131<L>  |  92<L>  |  105<HH>  ----------------------------<  43<LL>          (07-27-23 @ 05:33)  3.3<L>   |  28  |  3.6<H>    Ca    9.2          (07-27-23 @ 05:33)  Phos  3.0         (07-27-23 @ 05:33)  Mg     2.7         (07-27-23 @ 05:33)    TPro  5.7<L>  /  Alb  2.2<L>  /  TBili  0.3  /  DBili  x   /  AST  16  /  ALT  12  /  AlkPhos  125<H>       07-27-23 @ 05:33  Triglycerides, Serum: 37 mg/dL (07-27 @ 05:33)  Vitamin D, 25-Hydroxy: 70 ng/mL (06-03 @ 06:50)  Folate: >20.0 ng/mL (06-03 @ 06:50)  Vitamin B12, Serum: 1908 pg/mL (06-06 @ 10:40)  Zinc Level, Plasma: 54 ug/dL (06-03 @ 06:50)  A1c: 6.2 % (05-31-23 @ 05:50)  Blood Glucose (Past 24 hours):  86 mg/dL (07-27 @ 06:31)  98 mg/dL (07-27 @ 05:27)  62 mg/dL (07-27 @ 04:11)  DIET:   Diet, NPO:   Except Medications (07-27-23 @ 06:27) [Active]  RADIOLOGY:   < from: Xray Chest 1 View-PORTABLE IMMEDIATE (Xray Chest 1 View-PORTABLE IMMEDIATE .) (07.27.23 @ 00:46) >  Unchanged tracheostomy and right dialysis catheter.  shunt.  Increased right effusion and diffuse hazy right lung opacities. Mild left   basal pleural-parenchymal opacity. No pneumothorax.  Stable cardiomediastinal silhouette.

## 2023-07-27 NOTE — CONSULT NOTE ADULT - ASSESSMENT
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  was found also to be hypoglycemic with blood sugar of 10 and had two episodes of tonic clonic seizures in the ED.   Patient non-verbal at baseline . Nephrology was consulted for ESRD.     ESRD on HD  - Last session outpatient on 7/17/23  - Not fluid overload  - Potassium 3.8 and being supplemented. Please be cautious not to overcorrect as patient is ESRD  - Bicarboate levels under control    Anemia of ESRD   - Hbg 7.1. 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  was found also to be hypoglycemic with blood sugar of 10 and had two episodes of tonic clonic seizures in the ED.   Patient non-verbal at baseline . Nephrology was consulted for ESRD.     ESRD on HD  - TTS schedule  - On pressors   - HD today   - Potassium 3.8 and being supplemented. Please be cautious not to overcorrect as patient is ESRD  - Bicarboate levels under control    Anemia of ESRD   - Hbg 7.1. 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  was found also to be hypoglycemic with blood sugar of 10 and had two episodes of tonic clonic seizures in the ED.   Patient non-verbal at baseline . Nephrology was consulted for ESRD.     ESRD on HD  - TTS schedule  - On pressors   - Fluid overload  - HD today as tolerated.   - Potassium 3.8 and being supplemented. Please be cautious not to overcorrect as patient is ESRD  - Bicarboate levels under control    Anemia of ESRD   - Hbg 7.1. On ritacrit and venofer     MBD  - Get phosphorous, Vitamin D  and PTH levels  - Goal -Phos<5 /[id number] 63-year-old male with a past medical history of hyperlipidemia, atrial fibrillation, diabetes, hypertension, large posterior fossa IPH w/ IVH/SAH/hydrocephalus, s/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  was found also to be hypoglycemic with blood sugar of 10 and had two episodes of tonic clonic seizures in the ED.   Patient non-verbal at baseline . Nephrology was consulted for ESRD.     ESRD on HD  - Biweekly schedule   - On pressors   - Fluid overload  - HD today as tolerated.   - Potassium 3.8 and being supplemented. Please be cautious not to overcorrect as patient is ESRD  - Bicarboate levels under control    Anemia of ESRD   - Hbg 7.1. 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  was found also to be hypoglycemic with blood sugar of 10 and had two episodes of tonic clonic seizures in the ED.   Patient non-verbal at baseline . Nephrology was consulted for ESRD.     ESRD on HD  - Biweekly schedule   - On levophed, BP on the low side  - Fluid overload, on ventilator low Fio2  - Doubt he can tolerate HD with UF  - electrolytes reviewed  - no acute indications to start CVVHD today  - c/w medical treatment, will attempt HD tomorrow if BP better controlled  - Hypokalemia s/p supplementation, no further K given ESRD status     Anemia of ESRD   - Hbg 7.1. On ritacrit and venofer     MBD  - Get phosphorous, Vitamin D  and PTH levels  - Goal -Phos<5 /[id number] 63-year-old male with a past medical history of hyperlipidemia, atrial fibrillation, diabetes, hypertension, large posterior fossa IPH w/ IVH/SAH/hydrocephalus, s/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  was found also to be hypoglycemic with blood sugar of 10 and had two episodes of tonic clonic seizures in the ED.   Patient non-verbal at baseline . Nephrology was consulted for ESRD.     ESRD on HD  - Biweekly schedule   - On levophed, BP on the low side  - Fluid overload, on ventilator low Fio2  - Doubt he can tolerate HD with UF  - electrolytes reviewed  - no acute indications to start CVVHD today  - c/w medical treatment, will attempt HD tomorrow if BP better controlled  - Hypokalemia s/p supplementation, no further K given ESRD status     Anemia of ESRD   - Hbg 7.1. On RETACRIT and venofer     MBD  - Get phosphorous, Vitamin D  and PTH levels  - Goal -Phos<5 /[id number]

## 2023-07-27 NOTE — PROGRESS NOTE ADULT - ASSESSMENT
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  was found also to be hypoglycemic with blood sugar of 10 and had two episodes of tonic clonic seizures in the ED.   Patient non-verbal at baseline .            #ESRD on Dialysis through Tesio cath   # Acute Renal Failure, started on HD 6/1, as per Renal team rec.   - daily BMP monitoring, daily weight, daily strict I/O chart  - patient is hypotensive- Bumex transitioned to PO 2 mg once daily to avoid symptomatic hypotension  - s/p permanent HD cath. placement on 7/7/23  - Midodrine prn. tx. prior to HD if b/p is borderline low    # anemia of chronic disease - no gross bleeding events  - 7/7; 7/17- s/p 1 unit PRBC, h/h stable  transfuse prn for goal > 7    # Hyperkalemia- corrected , d/c lokelma  # Hypophosphatemia- tx. with Neutra-phos packets x 2 via peg, close outpatient BMP , mg, phos monitoring     # Hypotension- d/c Cardizem, continue on Bumex, am serum Cortisol level- 12.3       # chronic respiratory failure, vent dependant via trach , trach care,  Vent management per Pulm. team   # Chronic dysphagia, sp peg, peg care   #Candiduria with U cx C tropicalis    # Afib w/ RVR - now rate controlled # Acute Decompensated CHF  -continued on Amiodarone tx, rate controlled ,no a/c tx due to anemia requiring blood transfusions     CAD s/p stents  NSTEMI, medical management   # DKA, resolved  # Hx ICH   Bedbound from NH   - c/w keppra 500mg BID for seizure prophylaxis  # multiple sacral wounds. sacral and buttock , further wound care as per Wound care specialist report, frequent body position change, decub. prevention tx.     # hx of ICH, chronic non-verbal , quadriplegia - continue daily care.     # h/o DM 2- continue bsfs monitoring  with insulin sliding scale co.    DVT proph: Heparin subc. tx.     #Progress Note Handoff:  Patient remains with very poor overall prognosis ,  HD tx. 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  was found also to be hypoglycemic with blood sugar of 10 and had two episodes of tonic clonic seizures in the ED.   Patient non-verbal at baseline .      #Hypoglycemia possibly due to sulfonyl urea med  #GTC seizure x2.  #status epilepticus in the setting of hypoglycemia  - Blood sugar in ed: 10  - s/P multiple iv dextrose solutions   - Patient s/p Versed 4mg x1, Keppra 1gram IV x1, Ativan 4mg IV x2, with Propofol gtt initiated.  - CTH, F/U read  - video EEG  - Continue non-titratable Propofol @ 50mcg/kg/min  - Seizure precautions  - In the setting of ESRD, D/C Keppra to VPA load 40mg/kg then continue 500mg q12hrs  - Obtain serum AED levels   - Correct any underlying metabolic, infectious, or electrolyte derangements      #ESRD on Dialysis through Tesio cath   # Acute Renal Failure, started on HD 6/1, as per Renal team rec.   - daily BMP monitoring, daily weight, daily strict I/O chart  - s/p permanent HD cath. placement on 7/7/23  - fu nephro notes     # anemia of chronic disease - no gross bleeding events  - hb 7.2  - s/p 1 pack RBC  - transfuse prn for goal > 7          # chronic respiratory failure, vent dependant via trach  -  trach care,  Vent management per Pulm. team     # Chronic dysphagia  - sp peg, peg care       # Afib w/ RVR   - now rate controlled   # Acute Decompensated CHF  -continued on Amiodarone tx, rate controlled ,no a/c tx due to anemia requiring blood transfusions     #CAD s/p stents  #NSTEMI, medical management   # Hx ICH   - Bedbound from NH   - c/w keppra 500mg BID for seizure prophylaxis      # multiple sacral wounds. sacral and buttock   , further wound care as per Wound care specialist report, frequent body position change, decub. prevention tx.     # hx of ICH  , chronic non-verbal , quadriplegia - continue daily care.     # h/o DM 2  - continue bsfs monitoring  with insulin sliding scale co.    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  was found also to be hypoglycemic with blood sugar of 10 and had two episodes of tonic clonic seizures in the ED.   Patient non-verbal at baseline .      #Hypoglycemia possibly due to sulfonyl urea med  #GTC seizure x2.  #status epilepticus in the setting of hypoglycemia  - Blood sugar in ed: 10  - s/P multiple iv dextrose solutions   - Patient s/p Versed 4mg x1, Keppra 1gram IV x1, Ativan 4mg IV x2, with Propofol gtt initiated.  - CTH, F/U read  - video EEG  - Continue non-titratable Propofol @ 50mcg/kg/min  - Seizure precautions  - In the setting of ESRD, D/C Keppra to VPA load 40mg/kg then continue 500mg q12hrs  - Obtain serum AED levels   - Correct any underlying metabolic, infectious, or electrolyte derangements    #ESRD on Dialysis through Tesio cath   # Acute Renal Failure, started on HD 6/1, as per Renal team rec.   - daily BMP monitoring, daily weight, daily strict I/O chart  - s/p permanent HD cath. placement on 7/7/23  - fu nephro notes     # anemia of chronic disease - no gross bleeding events  - hb 7.2  - s/p 1 pack RBC  - transfuse prn for goal > 7  - On ritacrit and venofer     # chronic respiratory failure, vent dependant via trach  -  trach care,  Vent management per Pulm. team     # Chronic dysphagia  - sp peg, peg care     # Afib w/ RVR  -now rate controlled     #Acute Decompensated CHF  -continued on Amiodarone tx, rate controlled     #CAD s/p stents  #NSTEMI, medical management   # Hx ICH   - Bedbound from NH   - c/w keppra 500mg BID for seizure prophylaxis  - fu neurosurgery     # multiple sacral wounds. sacral and buttock   - further wound care as per Wound care specialist report, frequent body position change  - fu burn team recs    # hx of ICH  - chronic non-verbal , quadriplegia - continue daily care.     # h/o DM 2  - continue bsfs monitoring  with insulin sliding scale co.    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  was found also to be hypoglycemic with blood sugar of 10 and had two episodes of tonic clonic seizures in the ED.   Patient non-verbal at baseline .      #Hypoglycemia possibly due to sulfonyl urea med  #GTC seizure x2.  #status epilepticus in the setting of hypoglycemia  #ESRD on glimiperide  - Blood sugar in ed: 10  - s/P multiple iv dextrose solutions   - Patient s/p Versed 4mg x1, Keppra 1gram IV x1, Ativan 4mg IV x2, with Propofol gtt initiated.  - CTH, F/U read  - video EEG: Focal and generalized slowing, Interictal activity, runs of bilateral FP ( higher amplitude from the right) rhythmic activity that appears not be physiological . ( ? of artifact). It appears as questonable low amplitude spike and aftergoing slow with modified morphology( ? possiblity of change due the the prior procedure)  - consider Ativan challenge as per NICU   - now on D10, propofol 50 and levophed. if no seizure dc propofol  - Hold ASA as per Neuro  - In the setting of ESRD, D/C Keppra to VPA load 40mg/kg then continue 500mg q12hrs  - Obtain serum AED levels   - F/U A1c, lipids, TSH, Ft4, pro-insulin and Serum Cortisol  - Follow up blood, urine and sputum cultures, procalcitonin, CRP  - Correct any underlying metabolic, infectious, or electrolyte derangements    #ESRD on Dialysis through Tesio cath   # Acute Renal Failure, started on HD 6/1, as per Renal team rec.   - daily BMP monitoring, daily weight, daily strict I/O chart  - s/p permanent HD cath. placement on 7/7/23  - fu nephro notes : Biweekly schedule . no acute indications to start CVVHD today. Get phosphorous, Vitamin D  and PTH levels. On ritacrit and venofer     # anemia of chronic disease - no gross bleeding events  - hb 7.2  - s/p 1 pack RBC  - transfuse prn for goal > 7  - On ritacrit and venofer   - Hold ASA as per Neuro    # Hx ICH   # s/p  shunt  - Bedbound from NH . chronic non-verbal , quadriplegia - continue daily care.   - c/w keppra 500mg BID for seizure prophylaxis  - fu neurosurgery   - Hold ASA as per Neuro      # chronic respiratory failure, vent dependant via trach  -  trach care,  Vent management per Pulm. team     # Chronic dysphagia  - sp peg, peg care     # Afib w/ RVR  -now rate controlled     #Acute Decompensated CHF  -continued on Amiodarone tx, rate controlled       # multiple sacral wounds and buttock   - further wound care as per Wound care specialist report, frequent body position change  - fu burn team recs    # h/o DM 2  - continue bsfs monitoring  with insulin sliding scale co.    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  was found also to be hypoglycemic with blood sugar of 10 and had two episodes of tonic clonic seizures in the ED.   Patient non-verbal at baseline .      #Hypoglycemia possibly due to sulfonyl urea med  #GTC seizure x2.  #status epilepticus in the setting of hypoglycemia  #ESRD on glimiperide  - Blood sugar in ed: 10  - s/P multiple iv dextrose solutions   - Patient s/p Versed 4mg x1, Keppra 1gram IV x1, Ativan 4mg IV x2, with Propofol gtt initiated.  - CTH, F/U read  - video EEG: Focal and generalized slowing, Interictal activity, runs of bilateral FP ( higher amplitude from the right) rhythmic activity that appears not be physiological . ( ? of artifact). It appears as questonable low amplitude spike and aftergoing slow with modified morphology( ? possiblity of change due the the prior procedure)  - consider Ativan challenge as per NICU   - now on D10, propofol 50 and levophed. if no seizure dc propofol  - Hold ASA as per Neuro  - In the setting of ESRD, D/C Keppra to VPA load 40mg/kg then continue 500mg q12hrs  - Obtain serum AED levels   - F/U A1c, lipids, TSH, Ft4, pro-insulin and Serum Cortisol  - Follow up blood, urine and sputum cultures, procalcitonin, CRP  - Correct any underlying metabolic, infectious, or electrolyte derangements    #ESRD on Dialysis through Tesio cath   # Acute Renal Failure, started on HD 6/1, as per Renal team rec.   - daily BMP monitoring, daily weight, daily strict I/O chart  - s/p permanent HD cath. placement on 7/7/23  - fu nephro notes : Biweekly schedule . no acute indications to start CVVHD today. Get phosphorous, Vitamin D  and PTH levels. On ritacrit and venofer     # anemia of chronic disease - no gross bleeding events  - hb 7.2  - s/p 1 pack RBC  - transfuse prn for goal > 7  - On ritacrit and venofer   - Hold ASA as per Neuro    # Hx ICH   # s/p  shunt  - Bedbound from NH . chronic non-verbal , quadriplegia - continue daily care.   - c/w keppra 500mg BID for seizure prophylaxis  - fu neurosurgery   - Hold ASA as per Neuro      # chronic respiratory failure, vent dependant via trach  -  trach care,  Vent management per Pulm. team     # Chronic dysphagia  - sp peg, peg care     # Afib w/ RVR  -now rate controlled     #Acute Decompensated CHF  -continued on Amiodarone tx, rate controlled       # multiple sacral wounds and buttock   - further wound care as per Wound care specialist report, frequent body position change  - fu burn team recs    # h/o DM 2  - continue bsfs monitoring  with insulin sliding scale co.    DVT prophylaxis: scd  Activity:  Diet: DASH  Dispo:  Code: DNR   63-year-old male with a past medical history of hyperlipidemia, atrial fibrillation, diabetes, hypertension, large posterior fossa IPH w/ IVH/SAH/hydrocephalus, s/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  was found also to be hypoglycemic with blood sugar of 10 and had two episodes of tonic clonic seizures in the ED.   Patient non-verbal at baseline .      #Hypoglycemia possibly due to sulfonyl urea med  #GTC seizure x2.  #status epilepticus in the setting of hypoglycemia  #ESRD on glimiperide  - Blood sugar in ed: 10  - s/P multiple iv dextrose solutions   - Patient s/p Versed 4mg x1, Keppra 1gram IV x1, Ativan 4mg IV x2, with Propofol gtt initiated.  - CTH, F/U read: In comparison to the previous head CT dated 12/12/2021: Stable position of a right transparietal  shunt catheter. Interval decreased size of the lateral ventricles, now both slit-like. New predominantly low-density subdural collection over the right cerebral convexity measuring 7 mm in width, likely related to shunting. New wedge-shaped hypodensity within the left occipital lobe likely reflecting age indeterminate infarct. Redemonstrated suboccipital craniectomy. 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 Progressive bilateral cerebellar encephalomalacia. New distended appearance of the cerebral aqueduct and fourth   ventricle. New bilateral mastoid and middle ear opacification.  - video EEG: Focal and generalized slowing, Interictal activity, runs of bilateral FP ( higher amplitude from the right) rhythmic activity that appears not be physiological . ( ? of artifact). It appears as questonable low amplitude spike and aftergoing slow with modified morphology( ? possiblity of change due the the prior procedure)  - consider Ativan challenge as per NICU   - now on D10, propofol 50 and levophed. if no seizure dc propofol  - Hold ASA as per Neuro  - In the setting of ESRD, D/C Keppra to VPA load 40mg/kg then continue 500mg q12hrs  - Obtain serum AED levels   - F/U A1c, lipids, TSH, Ft4, pro-insulin and Serum Cortisol  - Follow up blood, urine and sputum cultures, procalcitonin, CRP  - Correct any underlying metabolic, infectious, or electrolyte derangements    #ESRD on Dialysis through Tesio cath   # Acute Renal Failure, started on HD 6/1, as per Renal team rec.   - daily BMP monitoring, daily weight, daily strict I/O chart  - s/p permanent HD cath. placement on 7/7/23  - fu nephro notes : Biweekly schedule . no acute indications to start CVVHD today. Get phosphorous, Vitamin D  and PTH levels. On ritacrit and venofer     # anemia of chronic disease - no gross bleeding events  - hb 7.2  - s/p 1 pack RBC  - transfuse prn for goal > 7  - On ritacrit and venofer   - Hold ASA as per Neuro    # Hx ICH   # s/p  shunt  - Bedbound from NH . chronic non-verbal , quadriplegia - continue daily care.   - c/w keppra 500mg BID for seizure prophylaxis  - fu neurosurgery   - Hold ASA as per Neuro      # chronic respiratory failure, vent dependant via trach  -  trach care,  Vent management per Pulm. team     # Chronic dysphagia  - sp peg, peg care     # Afib w/ RVR  -now rate controlled     #Acute Decompensated CHF  -continued on Amiodarone tx, rate controlled       # multiple sacral wounds and buttock   - further wound care as per Wound care specialist report, frequent body position change  - fu burn team recs    # h/o DM 2  - continue bsfs monitoring  with insulin sliding scale co.    DVT prophylaxis: scd  Activity:  Diet: DASH  Dispo:  Code: DNR

## 2023-07-27 NOTE — H&P ADULT - HISTORY OF PRESENT ILLNESS
63-year-old male with a past medical history of hyperlipidemia, atrial fibrillation, diabetes, hypertension, intracranial hemorrhage, CAD s/p stents, Recurrent C diff, ESRD on Dialysis through Tesio cath and is trach/PEG who was brought in from nursing home initially for anemia was found also to be hypoglycemic at 10 and had 2 episodes of tonic clonic seizures in the ED.    Blood work was done at nursing home and found out that hemoglobin was 6.4. Patient was recently started on Glimeperide at the NH.  No sign recent fevers, bleeding per PEG, thickening of the sputum or hematochezia.    In the ED the patient was hemodynamically unstable and in status epilepticus.  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    Blood work was done at nursing home and found out that hemoglobin was 6.4. Patient was recently started on Glimeperide at the NH.  No sign recent fevers, bleeding per PEG, thickening of the sputum or hematochezia.    In the ED the patient was hemodynamically unstable and in status epilepticus.  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    Blood work was done at nursing home and found out that hemoglobin was 6.4. Patient was recently started on Glimeperide at the NH.  No sign recent fevers, bleeding per PEG, thickening of the sputum or hematochezia.    In the ED the patient was hemodynamically unstable and in status epilepticus.  Versed, Ativan, keppra and propofol were given in the ED and patient was started on Valproic acid as per NeuroCrit team  MAP less than 65 patient was started on Levophed after central line insertion  Labs were significant for mild leukocytosis, severe hypoglycemia and Hemoglobin 7,2    Patient admitted for status epilepticus secondary to hypoglycemia and possible sepsis

## 2023-07-27 NOTE — PATIENT PROFILE ADULT - FALL HARM RISK - HARM RISK INTERVENTIONS

## 2023-07-27 NOTE — CONSULT NOTE ADULT - SUBJECTIVE AND OBJECTIVE BOX
SUMMARY: HPI:  63-year-old male with a PMHx of HLD, afib, CAD, s/p PCI, on ASA, DM, HTN, (+) large posterior fossa IPH w/ IVH/SAH/hydrocephaulus, s/p EVD and SOC, [11/4/2021 @ [facility]], DSA (+) PICA aneurysm, s/p SOC PICA aneurysm resection, [11/11/2021], s/p right parietal  shunt [11/23/2021], s/p trach/PEG [11/19/2021], Recurrent C diff, ESRD on HMD weekly through Tesio cath who was brought in from nursing home initially for anemia was found also to be hypoglycemic at 10 and had 2 episodes of tonic clonic seizures in the ED.      Blood work was done at nursing home and found out that hemoglobin was 6.4. Patient was recently started on Glimeperide at the NH.  No sign recent fevers, bleeding per PEG, thickening of the sputum or hematochezia.    In the ED the patient was hemodynamically unstable and in status epilepticus.  Ativan, keppra and propofol were given and patient was started on Valproic acid as per NeuroCrit team  MAP less than 65 patient was started on Levophed after central line insertion  Labs were significant for mild leukocytosis, severe hypoglycemia and Hemoglobin 7,2    Patient admitted for status epilepticus secondary to hypoglycemia and possible sepsis (27 Jul 2023 01:00)      NCC consulted for seizures ?status epilepticus in the setting of hypoglycemia. Patient s/p Versed 4mg x1, Keppra 1gram IV x1, Ativan 4mg IV x2, with Propofol gtt initiated. Admitted to MICU      REVIEW OF SYSTEMS: Patient unable to participate in ROS due to neurologic status.     VITALS: [X] Reviewed    IMAGING/DATA: [X] Reviewed    IVF FLUIDS/MEDICATIONS: [X] Reviewed    ALLERGIES: Allergies    penicillin (Other)    Intolerances    EXAMINATION:  General: ill-appearing trached male   HEENT: Anicteric sclerae  Cardiac: [id number]  Lungs: Clear  Abdomen: Soft, non-tender, +BS  Extremities: LUE (+) 4 pitting edema and swelling   Neurologic: trached, s/p Ativan and Versed, no EO to voice or noxious stimuli, not following commands, Pupil 3mm brisk bilaterally, dysconjugate gaze, no nystagmus, no gaze preference, contracted RUE, ?trace WD RUE/RLE, no movement to noxious stimuli LLE      ICU Vital Signs Last 24 Hrs  T(C): 36.1 (27 Jul 2023 00:30), Max: 36.5 (26 Jul 2023 19:50)  T(F): 97 (27 Jul 2023 00:30), Max: 97.7 (26 Jul 2023 19:50)  HR: 86 (27 Jul 2023 00:30) (86 - 89)  BP: 111/61 (27 Jul 2023 00:30) (111/61 - 179/102)  BP(mean): --  ABP: --  ABP(mean): --  RR: 18 (27 Jul 2023 00:30) (16 - 18)  SpO2: 100% (27 Jul 2023 00:30) (100% - 100%)          acetaminophen     Tablet .. 650 milliGRAM(s) Oral every 6 hours PRN  aluminum hydroxide/magnesium hydroxide/simethicone Suspension 30 milliLiter(s) Oral every 4 hours PRN  amikacin  IVPB 500 milliGRAM(s) IV Intermittent once  aMIOdarone    Tablet 200 milliGRAM(s) Oral daily  ascorbic acid 500 milliGRAM(s) Oral daily  buMETAnide 1 milliGRAM(s) Oral daily  collagenase Ointment 1 Application(s) Topical two times a day  dextrose 10%. 1000 milliLiter(s) (50 mL/Hr) IV Continuous <Continuous>  doxazosin 4 milliGRAM(s) Oral at bedtime  epoetin guanakito-epbx (RETACRIT) Injectable 15045 Unit(s) SubCutaneous every 7 days  ergocalciferol Drops 4000 Unit(s) Oral every 24 hours  ferrous    sulfate Liquid 300 milliGRAM(s) Enteral Tube daily  folic acid 1 milliGRAM(s) Oral daily  lactulose Syrup 20 Gram(s) Oral every 8 hours  melatonin 3 milliGRAM(s) Oral at bedtime PRN  multivitamin 1 Tablet(s) Oral daily  norepinephrine Infusion 0.05 MICROgram(s)/kG/Min (8.28 mL/Hr) IV Continuous <Continuous>  pantoprazole  Injectable 40 milliGRAM(s) IV Push two times a day  polyethylene glycol 3350 17 Gram(s) Oral daily  propofol Infusion. 50 MICROgram(s)/kG/Min (26.5 mL/Hr) IV Continuous <Continuous>  sodium chloride 0.9% Bolus 1000 milliLiter(s) IV Bolus once  valproate sodium  IVPB 500 milliGRAM(s) IV Intermittent two times a day  valproate sodium  IVPB 3000 milliGRAM(s) IV Intermittent once  vancomycin  IVPB 1000 milliGRAM(s) IV Intermittent every 24 hours      LABS:  Na: 134 (07-26 @ 21:00)  K: 3.7 (07-26 @ 21:00)  Cl: 93 (07-26 @ 21:00)  CO2: 28 (07-26 @ 21:00)  BUN: 98 (07-26 @ 21:00)  Cr: 3.4 (07-26 @ 21:00)  Glu: 10(07-26 @ 21:00)    Hgb: 7.2 (07-26 @ 21:00)  Hct: 22.5 (07-26 @ 21:00)  WBC: 11.14 (07-26 @ 21:00)  Plt: 420 (07-26 @ 21:00)    INR: 0.99 07-26-23 @ 21:00  PTT: 33.1 07-26-23 @ 21:00      LIVER FUNCTIONS - ( 26 Jul 2023 21:00 )  Alb: 2.5 g/dL / Pro: 6.2 g/dL / ALK PHOS: 153 U/L / ALT: 12 U/L / AST: 16 U/L / GGT: x                  SUMMARY: HPI:  63-year-old male with a PMHx of HLD, afib, CAD, s/p PCI, on ASA, DM, HTN, (+) large posterior fossa IPH w/ IVH/SAH/hydrocephalus, s/p EVD and SOC, [11/4/2021 @ [facility]], DSA (+) PICA aneurysm, s/p SOC PICA aneurysm resection, [11/11/2021], s/p right parietal  shunt [11/23/2021], s/p trach/PEG [11/19/2021], Recurrent C diff, ESRD on HMD weekly through Tesio cath who was brought in from nursing home initially for anemia was found also to be hypoglycemic at 10 and had 2 episodes of tonic clonic seizures in the ED.      Blood work was done at nursing home and found out that hemoglobin was 6.4. Patient was recently started on Glimepiride at the NH.  No sign recent fevers, bleeding per PEG, thickening of the sputum or hematochezia.    In the ED the patient was hemodynamically unstable and in status epilepticus.  Ativan, keppra and propofol were given and patient was started on Valproic acid as per NeuroCrit team  MAP less than 65 patient was started on Levophed after central line insertion  Labs were significant for mild leukocytosis, severe hypoglycemia and Hemoglobin 7,2    Patient admitted for status epilepticus secondary to hypoglycemia and possible sepsis (27 Jul 2023 01:00)      NCC consulted for seizures ?status epilepticus in the setting of hypoglycemia. Patient s/p Versed 4mg x1, Keppra 1gram IV x1, Ativan 4mg IV x2, with Propofol gtt initiated. Admitted to MICU      REVIEW OF SYSTEMS: Patient unable to participate in ROS due to neurologic status.     VITALS: [X] Reviewed    IMAGING/DATA: [X] Reviewed    IVF FLUIDS/MEDICATIONS: [X] Reviewed    ALLERGIES: Allergies    penicillin (Other)    Intolerances    EXAMINATION:  General: ill-appearing trached male   HEENT: Anicteric sclerae  Cardiac: [id number]  Lungs: Clear  Abdomen: Soft, non-tender, +BS  Extremities: LUE (+) 4 pitting edema and swelling   Neurologic: trached, s/p Ativan and Versed, no EO to voice or noxious stimuli, not following commands, Pupil 3mm brisk bilaterally, dysconjugate gaze, no nystagmus, no gaze preference, contracted RUE, ?trace WD RUE/RLE, no movement to noxious stimuli LLE      ICU Vital Signs Last 24 Hrs  T(C): 36.1 (27 Jul 2023 00:30), Max: 36.5 (26 Jul 2023 19:50)  T(F): 97 (27 Jul 2023 00:30), Max: 97.7 (26 Jul 2023 19:50)  HR: 86 (27 Jul 2023 00:30) (86 - 89)  BP: 111/61 (27 Jul 2023 00:30) (111/61 - 179/102)  BP(mean): --  ABP: --  ABP(mean): --  RR: 18 (27 Jul 2023 00:30) (16 - 18)  SpO2: 100% (27 Jul 2023 00:30) (100% - 100%)          acetaminophen     Tablet .. 650 milliGRAM(s) Oral every 6 hours PRN  aluminum hydroxide/magnesium hydroxide/simethicone Suspension 30 milliLiter(s) Oral every 4 hours PRN  amikacin  IVPB 500 milliGRAM(s) IV Intermittent once  aMIOdarone    Tablet 200 milliGRAM(s) Oral daily  ascorbic acid 500 milliGRAM(s) Oral daily  buMETAnide 1 milliGRAM(s) Oral daily  collagenase Ointment 1 Application(s) Topical two times a day  dextrose 10%. 1000 milliLiter(s) (50 mL/Hr) IV Continuous <Continuous>  doxazosin 4 milliGRAM(s) Oral at bedtime  epoetin guanakito-epbx (RETACRIT) Injectable 77288 Unit(s) SubCutaneous every 7 days  ergocalciferol Drops 4000 Unit(s) Oral every 24 hours  ferrous    sulfate Liquid 300 milliGRAM(s) Enteral Tube daily  folic acid 1 milliGRAM(s) Oral daily  lactulose Syrup 20 Gram(s) Oral every 8 hours  melatonin 3 milliGRAM(s) Oral at bedtime PRN  multivitamin 1 Tablet(s) Oral daily  norepinephrine Infusion 0.05 MICROgram(s)/kG/Min (8.28 mL/Hr) IV Continuous <Continuous>  pantoprazole  Injectable 40 milliGRAM(s) IV Push two times a day  polyethylene glycol 3350 17 Gram(s) Oral daily  propofol Infusion. 50 MICROgram(s)/kG/Min (26.5 mL/Hr) IV Continuous <Continuous>  sodium chloride 0.9% Bolus 1000 milliLiter(s) IV Bolus once  valproate sodium  IVPB 500 milliGRAM(s) IV Intermittent two times a day  valproate sodium  IVPB 3000 milliGRAM(s) IV Intermittent once  vancomycin  IVPB 1000 milliGRAM(s) IV Intermittent every 24 hours      LABS:  Na: 134 (07-26 @ 21:00)  K: 3.7 (07-26 @ 21:00)  Cl: 93 (07-26 @ 21:00)  CO2: 28 (07-26 @ 21:00)  BUN: 98 (07-26 @ 21:00)  Cr: 3.4 (07-26 @ 21:00)  Glu: 10(07-26 @ 21:00)    Hgb: 7.2 (07-26 @ 21:00)  Hct: 22.5 (07-26 @ 21:00)  WBC: 11.14 (07-26 @ 21:00)  Plt: 420 (07-26 @ 21:00)    INR: 0.99 07-26-23 @ 21:00  PTT: 33.1 07-26-23 @ 21:00      LIVER FUNCTIONS - ( 26 Jul 2023 21:00 )  Alb: 2.5 g/dL / Pro: 6.2 g/dL / ALK PHOS: 153 U/L / ALT: 12 U/L / AST: 16 U/L / GGT: x

## 2023-07-27 NOTE — H&P ADULT - NSHPPHYSICALEXAM_GEN_ALL_CORE
CONSTITUTIONAL: in no apparent distress.   HEAD: Normocephalic; atraumatic.   RESPIRATORY: No signs of respiratory distress. Scattered Rhonchi. Trach in place  CARDIOVASCULAR: Regular rate and rhythm.   GI: Abdomen is soft, non-tender, and without distention. Bowel sounds are present and normoactive in all four quadrants. No masses are noted. PEG in place , clean site  NEURO: A & O x 0

## 2023-07-27 NOTE — H&P ADULT - ATTENDING COMMENTS
Events noted, well known from prior admission, presented with seizure, severe hypoglycemia/ ESRD on glimiperide, sp NICU, now on D10, propofol 50 and levophed, on V EEG, FS q 1, renal FUP, if no seizure dc propofol, dc teixeira fup cx, very poor prongosis

## 2023-07-27 NOTE — CONSULT NOTE ADULT - ASSESSMENT
ASSESSMENT: 63-year-old male p/w anemia [Hgb 6.4], extreme hypoglycemia [serum glucose 10], and ?sepsis s/p GTC seizure x2. NCC consulted for seizures ?status epilepticus in the setting of hypoglycemia. Patient s/p Versed 4mg x1, Keppra 1gram IV x1, Ativan 4mg IV x2, with Propofol gtt initiated. Admitted to MICU      RECOMMENDATIONS:  - CTH, F/U read  - video EEG  - Continue non-titratable Propofol @ 50mcg/kg/min  - Seizure precautions  - In the setting of ESRD, change Keppra to VPA load 40mg/kg then continue 500mg q12hrs  - Obtain serum AED levels   - Correct any underlying metabolic, infectious, or electrolyte derangements  - Medical management as per primary team      Case discussed with NCC Attending Dr. Garcia and MICU team  [phone] ASSESSMENT: 63-year-old male p/w anemia [Hgb 6.4], extreme hypoglycemia [serum glucose 10], and ?sepsis s/p GTC seizure x2. NCC consulted for seizures ?status epilepticus in the setting of hypoglycemia. Patient s/p Versed 4mg x1, Keppra 1gram IV x1, Ativan 4mg IV x2, with Propofol gtt initiated. Admitted to MICU      RECOMMENDATIONS:  - CTH, F/U read  - video EEG  - Continue non-titratable Propofol @ 50mcg/kg/min  - Seizure precautions  - In the setting of ESRD, D/C Keppra to VPA load 40mg/kg then continue 500mg q12hrs  - Obtain serum AED levels   - Correct any underlying metabolic, infectious, or electrolyte derangements  - Medical management as per primary team      Case discussed with NCC Attending Dr. Garcia and MICU team  [phone] ASSESSMENT: 63-year-old male p/w anemia [Hgb 6.4], extreme hypoglycemia [serum glucose 10], and ?sepsis s/p GTC seizure x2. NCC consulted for seizures ?status epilepticus in the setting of hypoglycemia. Patient s/p Versed 4mg x1, Keppra 1gram IV x1, Ativan 4mg IV x2, with Propofol gtt initiated. Admitted to MICU      RECOMMENDATIONS:  - Repeat CTH, 24 hrs from the previous scan  - video EEG  - Continue non-titratable Propofol @ 50mcg/kg/min  - Seizure precautions  - In the setting of ESRD, D/C Keppra to VPA load 40mg/kg then continue 500mg q8hrs  - Obtain serum AED levels  - Follow neurosurgery for subdural collection  - Correct any underlying metabolic, infectious, or electrolyte derangements  - Medical management as per primary team      Case discussed with NCC Attending Dr. Garcia and MICU team  [phone] ASSESSMENT: 63-year-old male p/w anemia [Hgb 6.4], extreme hypoglycemia [serum glucose 10], and ?sepsis s/p GTC seizure x2. NCC consulted for seizures ?status epilepticus in the setting of hypoglycemia. Patient s/p Versed 4mg x1, Keppra 1gram IV x1, Ativan 4mg IV x2, with Propofol gtt initiated. Admitted to MICU      RECOMMENDATIONS:  - Repeat CTH, 24 hrs from the previous scan  - video EEG  - Continue non-titratable Propofol @ 50mcg/kg/min  - Seizure precautions  - In the setting of ESRD, D/C Keppra to VPA load 40mg/kg then continue 500mg q8hrs  - Obtain serum AED levels  - Follow neurosurgery for subdural collection in setting of drainage from VPS and evaluation for possible adjustment of dial  - Correct any underlying metabolic, infectious, or electrolyte derangements  - Medical management as per primary team      Case discussed with NCC Attending Dr. Garcia and MICU team  [phone]

## 2023-07-27 NOTE — CONSULT NOTE ADULT - ASSESSMENT
Pt is a 62yo M a/w anemia and seizures;  consulted for assistance with straight cath/ teixeira catheter placement-- needed for urine sample and I&O's.     ·	12Fr coude placed with return of yellow urine   ·	UA/UCx   ·	f/u RBUS   ·	Dialysis per nephrology  ·	TOV when teixeira no longer medically needed   ·	Penoscrotal elevation   ·	RN at bedside

## 2023-07-27 NOTE — PROGRESS NOTE ADULT - SUBJECTIVE AND OBJECTIVE BOX
24H events:    Patient is a 63y old Male who presents with a chief complaint of Seizures and Anemia (27 Jul 2023 05:22)    Primary diagnosis of Hypoglycemia       Today is hospital day 1d.      Patient seen and examined at bedside. Reports no active complaints.     PAST MEDICAL & SURGICAL HISTORY  HTN (hypertension)    Diabetes mellitus    H/O intracranial hemorrhage    H/O tracheostomy    PEG (percutaneous endoscopic gastrostomy) status    Hyperlipidemia      SOCIAL HISTORY:  Negative for smoking/alcohol/drug use.     ALLERGIES:  penicillin (Other)    MEDICATIONS:  STANDING MEDICATIONS  albuterol/ipratropium for Nebulization 3 milliLiter(s) Nebulizer every 6 hours  amikacin  IVPB 500 milliGRAM(s) IV Intermittent once  aMIOdarone    Tablet 200 milliGRAM(s) Oral daily  ascorbic acid 500 milliGRAM(s) Oral daily  buMETAnide 1 milliGRAM(s) Oral daily  chlorhexidine 0.12% Liquid 15 milliLiter(s) Oral Mucosa every 12 hours  chlorhexidine 2% Cloths 1 Application(s) Topical <User Schedule>  collagenase Ointment 1 Application(s) Topical two times a day  dextrose 10%. 1000 milliLiter(s) IV Continuous <Continuous>  doxazosin 4 milliGRAM(s) Oral at bedtime  epoetin guanakito-epbx (RETACRIT) Injectable 47187 Unit(s) SubCutaneous every 7 days  ergocalciferol Drops 4000 Unit(s) Oral every 24 hours  ferrous    sulfate Liquid 300 milliGRAM(s) Enteral Tube daily  folic acid 1 milliGRAM(s) Oral daily  glucagon  Injectable 1 milliGRAM(s) IV Push once  lactulose Syrup 20 Gram(s) Oral every 8 hours  multivitamin 1 Tablet(s) Oral daily  norepinephrine Infusion 0.05 MICROgram(s)/kG/Min IV Continuous <Continuous>  pantoprazole  Injectable 40 milliGRAM(s) IV Push two times a day  polyethylene glycol 3350 17 Gram(s) Oral daily  propofol Infusion. 50 MICROgram(s)/kG/Min IV Continuous <Continuous>  sodium chloride 0.9% Bolus 1000 milliLiter(s) IV Bolus once  valproate sodium  IVPB 500 milliGRAM(s) IV Intermittent two times a day  vancomycin    Solution 125 milliGRAM(s) Oral daily  vancomycin  IVPB 1000 milliGRAM(s) IV Intermittent every 24 hours    PRN MEDICATIONS  acetaminophen     Tablet .. 650 milliGRAM(s) Oral every 6 hours PRN  aluminum hydroxide/magnesium hydroxide/simethicone Suspension 30 milliLiter(s) Oral every 4 hours PRN  melatonin 3 milliGRAM(s) Oral at bedtime PRN    VITALS:   T(F): 97.1  HR: 78  BP: 91/54  RR: 14  SpO2: 100%    LABS:                        7.1    11.37 )-----------( 395      ( 27 Jul 2023 05:33 )             22.3     07-27    131<L>  |  92<L>  |  105<HH>  ----------------------------<  43<LL>  3.3<L>   |  28  |  3.6<H>    Ca    9.2      27 Jul 2023 05:33  Phos  3.0     07-27  Mg     2.7     07-27    TPro  5.7<L>  /  Alb  2.2<L>  /  TBili  0.3  /  DBili  x   /  AST  16  /  ALT  12  /  AlkPhos  125<H>  07-27    PT/INR - ( 26 Jul 2023 21:00 )   PT: 11.30 sec;   INR: 0.99 ratio         PTT - ( 26 Jul 2023 21:00 )  PTT:33.1 sec  Urinalysis Basic - ( 27 Jul 2023 05:33 )    Color: x / Appearance: x / SG: x / pH: x  Gluc: 43 mg/dL / Ketone: x  / Bili: x / Urobili: x   Blood: x / Protein: x / Nitrite: x   Leuk Esterase: x / RBC: x / WBC x   Sq Epi: x / Non Sq Epi: x / Bacteria: x      ABG - ( 27 Jul 2023 02:45 )  pH, Arterial: 7.40  pH, Blood: x     /  pCO2: 45    /  pO2: 127   / HCO3: 28    / Base Excess: 2.5   /  SaO2: 98.7              Lactate, Blood: 0.6 mmol/L *L* (07-27-23 @ 05:33)  Creatine Kinase, Serum: 115 U/L (07-27-23 @ 05:33)      CARDIAC MARKERS ( 27 Jul 2023 05:33 )  x     / x     / 115 U/L / x     / x          RADIOLOGY:    PHYSICAL EXAM:  GENERAL: in no acute distress, has PEG and trach tube   HEAD:  Atraumatic, Normocephalic  EYES: conjunctiva and sclera clear  ENMT: No tonsillar erythema, exudates, or enlargement; Moist mucous membranes,   NECK: Supple, No JVD, Normal thyroid  NERVOUS SYSTEM: Patient unable to follow commands. awake, alert.  CHEST/LUNG: Clear to percussion bilaterally; No rales, rhonchi, wheezing, or rubs  HEART: Regular rate and rhythm; No murmurs, rubs, or gallops  ABDOMEN: Soft, Nontender, Nondistended; Bowel sounds present, PEG tube noted.   EXTREMITIES:  2+ Peripheral Pulses,  edema noted   LYMPH: No lymphadenopathy noted  SKIN: pressure ulcers sacrum

## 2023-07-27 NOTE — GOALS OF CARE CONVERSATION - ADVANCED CARE PLANNING - CONVERSATION DETAILS
I spoke with wife Ms Curry, via phone call [phone] who was identified as the health care surrogate / health care proxy and we had an extensive conversation about patient 's care and current condition. Discussed full vs limited medical management. Extensively explained that full medical management would involve intubating the patient if medically indicated and performing chest compressions in an attempt to resuscitate if the patient were to arrest. Further explained the concept of limited medical management, such as DNR/DNI. Further explained that DNR/DNI would not preclude medical management up until the point of arrest/intubation (such as antibiotics, IV fluids, blood draws, etc.). wife expressed understanding of above. Decision made, patient is dnr with tracheostomy tube ventilation.

## 2023-07-27 NOTE — CONSULT NOTE ADULT - SUBJECTIVE AND OBJECTIVE BOX
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  was found also to be hypoglycemic with blood sugar of 10 and had two episodes of tonic clonic seizures in the ED.   Patient non-verbal at baseline . Nephrology was consulted for ESRD.     PAST MEDICAL & SURGICAL HISTORY:  HTN (hypertension)  Diabetes mellitus  H/O intracranial hemorrhage  H/O tracheostomy  PEG (percutaneous endoscopic gastrostomy) status  Hyperlipidemia    Allergies:  penicillin (Other)    Home Medications Reviewed  Hospital Medications:   MEDICATIONS  (STANDING):  albuterol/ipratropium for Nebulization 3 milliLiter(s) Nebulizer every 6 hours  aMIOdarone    Tablet 200 milliGRAM(s) Oral daily  ascorbic acid 500 milliGRAM(s) Oral daily  aspirin  chewable 81 milliGRAM(s) Oral daily  atorvastatin 80 milliGRAM(s) Oral at bedtime  buMETAnide 1 milliGRAM(s) Oral daily  chlorhexidine 0.12% Liquid 15 milliLiter(s) Oral Mucosa every 12 hours  chlorhexidine 2% Cloths 1 Application(s) Topical <User Schedule>  collagenase Ointment 1 Application(s) Topical two times a day  dextrose 10%. 1000 milliLiter(s) (75 mL/Hr) IV Continuous <Continuous>  doxazosin 4 milliGRAM(s) Oral at bedtime  epoetin guanakito-epbx (RETACRIT) Injectable 84856 Unit(s) SubCutaneous every 7 days  ergocalciferol Drops 4000 Unit(s) Oral every 24 hours  ferrous    sulfate Liquid 300 milliGRAM(s) Enteral Tube daily  folic acid 1 milliGRAM(s) Oral daily  glucagon  Injectable 1 milliGRAM(s) IV Push once  lactulose Syrup 20 Gram(s) Oral every 8 hours  multivitamin 1 Tablet(s) Oral daily  norepinephrine Infusion 0.05 MICROgram(s)/kG/Min (8.28 mL/Hr) IV Continuous <Continuous>  pantoprazole  Injectable 40 milliGRAM(s) IV Push two times a day  polyethylene glycol 3350 17 Gram(s) Oral daily  potassium chloride  20 mEq/100 mL IVPB 20 milliEquivalent(s) IV Intermittent every 2 hours  propofol Infusion. 50 MICROgram(s)/kG/Min (26.5 mL/Hr) IV Continuous <Continuous>  valproate sodium  IVPB 500 milliGRAM(s) IV Intermittent two times a day    SOCIAL HISTORY:  Denies ETOH,Smoking,   FAMILY HISTORY:    Yes      REVIEW OF SYSTEMS:  CONSTITUTIONAL: No weakness, fevers or chills  EYES/ENT: No visual changes;  No vertigo or throat pain   NECK: No pain or stiffness  RESPIRATORY: No cough, wheezing, hemoptysis; No shortness of breath  CARDIOVASCULAR: No chest pain or palpitations.  GASTROINTESTINAL: No abdominal or epigastric pain. No nausea, vomiting, or hematemesis; No diarrhea or constipation. No melena or hematochezia.  GENITOURINARY: No dysuria, frequency, foamy urine, urinary urgency, incontinence or hematuria  NEUROLOGICAL: No numbness or weakness  SKIN: No itching, burning, rashes, or lesions   VASCULAR: No bilateral lower extremity edema.   All other review of systems is negative unless indicated above.    VITALS:  Vital Signs Last 24 Hrs  T(C): 36.2 (27 Jul 2023 12:00), Max: 36.7 (27 Jul 2023 01:16)  T(F): 97.1 (27 Jul 2023 12:00), Max: 98.1 (27 Jul 2023 01:16)  HR: 72 (27 Jul 2023 12:00) (72 - 89)  BP: 87/52 (27 Jul 2023 12:00) (87/52 - 179/102)  BP(mean): 64 (27 Jul 2023 12:00) (64 - 77)  RR: 14 (27 Jul 2023 08:00) (6 - 21)  SpO2: 100% (27 Jul 2023 12:00) (99% - 100%)    Parameters below as of 27 Jul 2023 13:00  Patient On (Oxygen Delivery Method): ventilator    O2 Concentration (%): 40    07-26 @ 07:01  -  07-27 @ 07:00  --------------------------------------------------------  IN: 840 mL / OUT: 200 mL / NET: 640 mL    07-27 @ 07:01  -  07-27 @ 12:18  --------------------------------------------------------  IN: 609 mL / OUT: 0 mL / NET: 609 mL      Height (cm): 172.7 (07-27 @ 01:16)  Weight (kg): 93.7 (07-27 @ 01:16)  BMI (kg/m2): 31.4 (07-27 @ 01:16)  BSA (m2): 2.07 (07-27 @ 01:16)  PHYSICAL EXAM:  Constitutional: NAD  HEENT: anicteric sclera, oropharynx clear, MMM  Neck: No JVD  Respiratory: CTAB, no wheezes, rales or rhonchi  Cardiovascular: S1, S2, RRR  Gastrointestinal: BS+, soft, NT/ND  Extremities: No cyanosis or clubbing. No peripheral edema  Neurological: A/O x 3, no focal deficits  Psychiatric: Normal mood, normal affect  : No CVA tenderness. No teixeira.   Skin: No rashes  Vascular Access:    LABS:  07-27    131<L>  |  92<L>  |  105<HH>  ----------------------------<  43<LL>  3.3<L>   |  28  |  3.6<H>    Ca    9.2      27 Jul 2023 05:33  Phos  3.0     07-27  Mg     2.7     07-27    TPro  5.7<L>  /  Alb  2.2<L>  /  TBili  0.3  /  DBili      /  AST  16  /  ALT  12  /  AlkPhos  125<H>  07-27    Creatinine Trend: 3.6 <--, 3.4 <--, 2.8 <--, 2.6 <--, 2.4 <--, 1.7 <--, 2.7 <--, 2.3 <--, 2.8 <--, 2.5 <--, 2.1 <--, 1.8 <--, 2.6 <--, 2.3 <--, 2.0 <--, 2.0 <--, 1.7 <--, 2.3 <--, 2.5 <--, 2.7 <--, 2.7 <--, 2.5 <--                        7.1    11.37 )-----------( 395      ( 27 Jul 2023 05:33 )             22.3     Urine Studies:  Urinalysis Basic - ( 27 Jul 2023 05:33 )    Color:  / Appearance:  / SG:  / pH:   Gluc: 43 mg/dL / Ketone:   / Bili:  / Urobili:    Blood:  / Protein:  / Nitrite:    Leuk Esterase:  / RBC:  / WBC    Sq Epi:  / Non Sq Epi:  / Bacteria:                          NEPHROLOGY CONSULTATION NOTE    63-year-old male with a past medical history of hyperlipidemia, atrial fibrillation, diabetes, hypertension, large posterior fossa IPH w/ IVH/SAH/hydrocephalus, s/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  was found also to be hypoglycemic with blood sugar of 10 and had two episodes of tonic clonic seizures in the ED.   Patient non-verbal at baseline . Nephrology was consulted for ESRD.     PAST MEDICAL & SURGICAL HISTORY:  HTN (hypertension)  Diabetes mellitus  H/O intracranial hemorrhage  H/O tracheostomy  PEG (percutaneous endoscopic gastrostomy) status  Hyperlipidemia    Allergies:  penicillin (Other)    Home Medications Reviewed  Hospital Medications:   MEDICATIONS  (STANDING):  albuterol/ipratropium for Nebulization 3 milliLiter(s) Nebulizer every 6 hours  aMIOdarone    Tablet 200 milliGRAM(s) Oral daily  ascorbic acid 500 milliGRAM(s) Oral daily  aspirin  chewable 81 milliGRAM(s) Oral daily  atorvastatin 80 milliGRAM(s) Oral at bedtime  buMETAnide 1 milliGRAM(s) Oral daily  chlorhexidine 0.12% Liquid 15 milliLiter(s) Oral Mucosa every 12 hours  chlorhexidine 2% Cloths 1 Application(s) Topical <User Schedule>  collagenase Ointment 1 Application(s) Topical two times a day  dextrose 10%. 1000 milliLiter(s) (75 mL/Hr) IV Continuous <Continuous>  doxazosin 4 milliGRAM(s) Oral at bedtime  epoetin guan[place]ito-epbx (RETACRIT) Injectable 19945 Unit(s) SubCutaneous every 7 days  ergocalciferol Drops 4000 Unit(s) Oral every 24 hours  ferrous    sulfate Liquid 300 milliGRAM(s) Enteral Tube daily  folic acid 1 milliGRAM(s) Oral daily  glucagon  Injectable 1 milliGRAM(s) IV Push once  lactulose Syrup 20 Gram(s) Oral every 8 hours  multivitamin 1 Tablet(s) Oral daily  norepinephrine Infusion 0.05 MICROgram(s)/kG/Min (8.28 mL/Hr) IV Continuous <Continuous>  pantoprazole  Injectable 40 milliGRAM(s) IV Push two times a day  polyethylene glycol 3350 17 Gram(s) Oral daily  potassium chloride  20 mEq/100 mL IVPB 20 milliEquivalent(s) IV Intermittent every 2 hours  propofol Infusion. 50 MICROgram(s)/kG/Min (26.5 mL/Hr) IV Continuous <Continuous>  valproate sodium  IVPB 500 milliGRAM(s) IV Intermittent two times a day    SOCIAL HISTORY:  Denies ETOH,Smoking,   FAMILY HISTORY:    Yes      REVIEW OF SYSTEMS:  Non-verbal     VITALS:  Vital Signs Last 24 Hrs  T(C): 36.2 (27 Jul 2023 12:00), Max: 36.7 (27 Jul 2023 01:16)  T(F): 97.1 (27 Jul 2023 12:00), Max: 98.1 (27 Jul 2023 01:16)  HR: 72 (27 Jul 2023 12:00) (72 - 89)  BP: 87/52 (27 Jul 2023 12:00) (87/52 - 179/102)  BP(mean): 64 (27 Jul 2023 12:00) (64 - 77)  RR: 14 (27 Jul 2023 08:00) (6 - 21)  SpO2: 100% (27 Jul 2023 12:00) (99% - 100%)    Parameters below as of 27 Jul 2023 13:00  Patient On (Oxygen Delivery Method): ventilator    O2 Concentration (%): 40    07-26 @ 07:01  -  07-27 @ 07:00  --------------------------------------------------------  IN: 840 mL / OUT: 200 mL / NET: 640 mL    07-27 @ 07:01  -  07-27 @ 12:18  --------------------------------------------------------  IN: 609 mL / OUT: 0 mL / NET: 609 mL      Height (cm): 172.7 (07-27 @ 01:16)  Weight (kg): 93.7 (07-27 @ 01:16)  BMI (kg/m2): 31.4 (07-27 @ 01:16)  BSA (m2): 2.07 (07-27 @ 01:16)  PHYSICAL EXAM:  Constitutional: Non-verbal  HEENT: anicteric sclera, oropharynx clear, MMM  Neck: No JVD  Respiratory: B/l crackles   Cardiovascular: S1, S2, RRR  Extremities: Grade 2 pedal edma bilaterally  Neurological: A/O x 3, no focal deficits  Psychiatric: Normal mood, normal affect  : No CVA tenderness. No teixeira.   Skin: No rashes  Vascular Access:    LABS:  07-27    131<L>  |  92<L>  |  105<HH>  ----------------------------<  43<LL>  3.3<L>   |  28  |  3.6<H>    Ca    9.2      27 Jul 2023 05:33  Phos  3.0     07-27  Mg     2.7     07-27    TPro  5.7<L>  /  Alb  2.2<L>  /  TBili  0.3  /  DBili      /  AST  16  /  ALT  12  /  AlkPhos  125<H>  07-27    Creatinine Trend: 3.6 <--, 3.4 <--, 2.8 <--, 2.6 <--, 2.4 <--, 1.7 <--, 2.7 <--, 2.3 <--, 2.8 <--, 2.5 <--, 2.1 <--, 1.8 <--, 2.6 <--, 2.3 <--, 2.0 <--, 2.0 <--, 1.7 <--, 2.3 <--, 2.5 <--, 2.7 <--, 2.7 <--, 2.5 <--                        7.1    11.37 )-----------( 395      ( 27 Jul 2023 05:33 )             22.3     Urine Studies:  Urinalysis Basic - ( 27 Jul 2023 05:33 )    Color:  / Appearance:  / SG:  / pH:   Gluc: 43 mg/dL / Ketone:   / Bili:  / Urobili:    Blood:  / Protein:  / Nitrite:    Leuk Esterase:  / RBC:  / WBC    Sq Epi:  / Non Sq Epi:  / Bacteria:                          NEPHROLOGY CONSULTATION NOTE    63-year-old male with a past medical history of hyperlipidemia, atrial fibrillation, diabetes, hypertension, large posterior fossa IPH w/ IVH/SAH/hydrocephalus, s/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  was found also to be hypoglycemic with blood sugar of 10 and had two episodes of tonic clonic seizures in the ED.   Patient non-verbal at baseline . Nephrology was consulted for ESRD.     PAST MEDICAL & SURGICAL HISTORY:  HTN (hypertension)  Diabetes mellitus  H/O intracranial hemorrhage  H/O tracheostomy  PEG (percutaneous endoscopic gastrostomy) status  Hyperlipidemia    Allergies:  penicillin (Other)    Home Medications Reviewed  Hospital Medications:   MEDICATIONS  (STANDING):  albuterol/ipratropium for Nebulization 3 milliLiter(s) Nebulizer every 6 hours  aMIOdarone    Tablet 200 milliGRAM(s) Oral daily  ascorbic acid 500 milliGRAM(s) Oral daily  aspirin  chewable 81 milliGRAM(s) Oral daily  atorvastatin 80 milliGRAM(s) Oral at bedtime  buMETAnide 1 milliGRAM(s) Oral daily  chlorhexidine 0.12% Liquid 15 milliLiter(s) Oral Mucosa every 12 hours  chlorhexidine 2% Cloths 1 Application(s) Topical <User Schedule>  collagenase Ointment 1 Application(s) Topical two times a day  dextrose 10%. 1000 milliLiter(s) (75 mL/Hr) IV Continuous <Continuous>  doxazosin 4 milliGRAM(s) Oral at bedtime  epoetin guan[place]ito-epbx (RETACRIT) Injectable 20623 Unit(s) SubCutaneous every 7 days  ergocalciferol Drops 4000 Unit(s) Oral every 24 hours  ferrous    sulfate Liquid 300 milliGRAM(s) Enteral Tube daily  folic acid 1 milliGRAM(s) Oral daily  glucagon  Injectable 1 milliGRAM(s) IV Push once  lactulose Syrup 20 Gram(s) Oral every 8 hours  multivitamin 1 Tablet(s) Oral daily  norepinephrine Infusion 0.05 MICROgram(s)/kG/Min (8.28 mL/Hr) IV Continuous <Continuous>  pantoprazole  Injectable 40 milliGRAM(s) IV Push two times a day  polyethylene glycol 3350 17 Gram(s) Oral daily  potassium chloride  20 mEq/100 mL IVPB 20 milliEquivalent(s) IV Intermittent every 2 hours  propofol Infusion. 50 MICROgram(s)/kG/Min (26.5 mL/Hr) IV Continuous <Continuous>  valproate sodium  IVPB 500 milliGRAM(s) IV Intermittent two times a day    SOCIAL HISTORY:  Denies ETOH,Smoking,   FAMILY HISTORY:    Yes      REVIEW OF SYSTEMS:  Non-verbal     VITALS:  Vital Signs Last 24 Hrs  T(C): 36.2 (27 Jul 2023 12:00), Max: 36.7 (27 Jul 2023 01:16)  T(F): 97.1 (27 Jul 2023 12:00), Max: 98.1 (27 Jul 2023 01:16)  HR: 72 (27 Jul 2023 12:00) (72 - 89)  BP: 87/52 (27 Jul 2023 12:00) (87/52 - 179/102)  BP(mean): 64 (27 Jul 2023 12:00) (64 - 77)  RR: 14 (27 Jul 2023 08:00) (6 - 21)  SpO2: 100% (27 Jul 2023 12:00) (99% - 100%)    Parameters below as of 27 Jul 2023 13:00  Patient On (Oxygen Delivery Method): ventilator    O2 Concentration (%): 40    07-26 @ 07:01  -  07-27 @ 07:00  --------------------------------------------------------  IN: 840 mL / OUT: 200 mL / NET: 640 mL    07-27 @ 07:01  -  07-27 @ 12:18  --------------------------------------------------------  IN: 609 mL / OUT: 0 mL / NET: 609 mL      Height (cm): 172.7 (07-27 @ 01:16)  Weight (kg): 93.7 (07-27 @ 01:16)  BMI (kg/m2): 31.4 (07-27 @ 01:16)  BSA (m2): 2.07 (07-27 @ 01:16)  PHYSICAL EXAM:  Constitutional: Non-verbal  HEENT: anicteric sclera, oropharynx clear, MMM  Neck: No JVD  Respiratory: B/l crackles   Cardiovascular: S1, S2, RRR  Extremities: Grade 2 pedal edma bilaterally  Neurological: resting in bed  Psychiatric: Normal mood, normal affect  : No CVA tenderness. No teixeira.   Skin: No rashes  Vascular Access: TDC    LABS:  07-27    131<L>  |  92<L>  |  105<HH>  ----------------------------<  43<LL>  3.3<L>   |  28  |  3.6<H>    Ca    9.2      27 Jul 2023 05:33  Phos  3.0     07-27  Mg     2.7     07-27    TPro  5.7<L>  /  Alb  2.2<L>  /  TBili  0.3  /  DBili      /  AST  16  /  ALT  12  /  AlkPhos  125<H>  07-27    Creatinine Trend: 3.6 <--, 3.4 <--, 2.8 <--, 2.6 <--, 2.4 <--, 1.7 <--, 2.7 <--, 2.3 <--, 2.8 <--, 2.5 <--, 2.1 <--, 1.8 <--, 2.6 <--, 2.3 <--, 2.0 <--, 2.0 <--, 1.7 <--, 2.3 <--, 2.5 <--, 2.7 <--, 2.7 <--, 2.5 <--                        7.1    11.37 )-----------( 395      ( 27 Jul 2023 05:33 )             22.3     Urine Studies:  Urinalysis Basic - ( 27 Jul 2023 05:33 )    Color:  / Appearance:  / SG:  / pH:   Gluc: 43 mg/dL / Ketone:   / Bili:  / Urobili:    Blood:  / Protein:  / Nitrite:    Leuk Esterase:  / RBC:  / WBC    Sq Epi:  / Non Sq Epi:  / Bacteria:

## 2023-07-27 NOTE — H&P ADULT - ASSESSMENT
63-year-old male with a past medical history of hyperlipidemia, atrial fibrillation, diabetes, hypertension, intracranial hemorrhage, CAD s/p stents, Recurrent C diff, ESRD on Dialysis through Tesio cath and is trach/PEG who was brought in from nursing home initially for anemia was found also to be hypoglycemic at 10 and had 2 episodes of tonic clonic seizures in the ED.     IMPRESSION:  Severe Hypoglycemia secondary to Sulfonylurea  Status Epilepticus  Sepsis possibly secondary to pneumonia and/or infected sacral ulcer  Hx of MDR Pseudomonas VAP  Hx of MRSA infected sacral wound  Hx of Recurrent C,dif  Hx of ICH s/p trach and PEG  CAD s/p stents on ASA  Hx of Upper GI Bleed  Oliguric ESRD on Dialysis since 6/2023  HFpEF  Afib  Diabetes Mellitus  Hypertension  Hyperlipidemia       PLAN:      CNS: Seizure precautions, Propofol drip, Load Valproic Acid 3G and continue 500 mg BID as per Neuro-crit  Stop Home Keppra, F/U ammonia and correct hypoglycemia. F/U CT head and Video EEG    HEENT: Oral care    PULMONARY:  HOB @ 45 degrees. Mechanical Ventilation through trach  CXR with right lung opacities possible congestion but can't r/o pneumonia.  f/u repeat CXR and ABG daily in AM.     CARDIOVASCULAR: avoid volume overload, Keep MAP >65 -> titrate Levophed  Continue Home Bumex with holding parameters  Continue Amiodarone ; Hold ASA as per Neuro  Check Lacate    GI: Pantoprazole 40 IV BID.  Feeding.  Lactulose and Miralax target 1-2 BM daily  DENISE negative in ED    RENAL:  Follow up lytes K and Ph.  Correct as needed  Nephrology consult ; Barney for Is and Os    INFECTIOUS DISEASE: Follow up blood, urine and sputum cultures, procalcitonin, CRP  Consider CT chest abdomen pelvis if pressors requirement increase  S/p 1 dose of Amikacin and Vancomycin IV -> c/s ID  Vancomycin 125 mg per PEG for C.dif prophylaxis    HEMATOLOGICAL:  DVT prophylaxis -> SCD ; F/u Duplex  F/U anemia panel including hemolysis, c/w iron and folate supplementation  Transfuse as needed keep Hb > 7 ; Active T&S    ENDOCRINE:  Follow up FS q1h , start D10% 50 ml/h  If refractory hypoglycemia consider octreotide 50 mcg to reverse LAKE effect  F/U A1c, lipids, TSH, Ft4, pro-insulin and Serum Cortisol    MUSCULOSKELETAL: Burn team consult for sacral ulcer, local care    Lines: RIght IJ     DNR    Dispo: ICU             63-year-old male with a past medical history of hyperlipidemia, atrial fibrillation, diabetes, hypertension, intracranial hemorrhage, CAD s/p stents, Recurrent C diff, ESRD on Dialysis through Tesio cath and is trach/PEG who was brought in from nursing home initially for anemia was found also to be hypoglycemic at 10 and had 2 episodes of tonic clonic seizures in the ED.     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 F/U CT head and Video EEG    HEENT: Oral care    PULMONARY:  HOB @ 45 degrees. Mechanical Ventilation through trach  CXR with right lung opacities possible congestion but can't r/o pneumonia.  f/u repeat CXR and ABG daily in AM.     CARDIOVASCULAR: avoid volume overload, Keep MAP >65 -> titrate Levophed  Continue Home Bumex with holding parameters  Continue Amiodarone ; Hold ASA as per Neuro  Check Lacate    GI: Pantoprazole 40 IV BID.  Feeding.  Lactulose and Miralax target 1-2 BM daily  DENISE negative in ED    RENAL:  Follow up lytes K and Ph.  Correct as needed  Nephrology consult ; Savita for Is and Os    INFECTIOUS DISEASE: Follow up blood, urine and sputum cultures, procalcitonin, CRP  Consider CT chest abdomen pelvis if pressors requirement increase  S/p 1 dose of Amikacin and Vancomycin IV -> c/s ID  Vancomycin 125 mg per PEG for C.dif prophylaxis    HEMATOLOGICAL:  DVT prophylaxis -> SCD ; F/u Duplex  F/U anemia panel including hemolysis, c/w iron and folate supplementation  Transfuse as needed keep Hb > 7 ; Active T&S    ENDOCRINE:  Follow up FS q1h , start D10% 50 ml/h  If refractory hypoglycemia consider octreotide 50 mcg to reverse LAKE effect  F/U A1c, lipids, TSH, Ft4, pro-insulin and Serum Cortisol    MUSCULOSKELETAL: Burn team consult for sacral ulcer, local care    Lines: RIght IJ     DNR    Dispo: ICU             63-year-old male with a past medical history of hyperlipidemia, atrial fibrillation, diabetes, hypertension, intracranial hemorrhage, CAD s/p stents, Recurrent C diff, ESRD on Dialysis through Tesio cath and is trach/PEG who was brought in from nursing home initially for anemia was found also to be hypoglycemic at 10 and had 2 episodes of tonic clonic seizures in the ED.     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 F/U CT head and Video EEG    HEENT: Oral care    PULMONARY:  HOB @ 45 degrees. Mechanical Ventilation through trach  CXR with right lung opacities possible congestion but can't r/o pneumonia.  f/u repeat CXR and ABG daily in AM.     CARDIOVASCULAR: avoid volume overload, Keep MAP >65 -> titrate Levophed  Continue Home Bumex with holding parameters  Continue Amiodarone ; Hold ASA as per Neuro  Check Lacate    GI: Pantoprazole 40 IV BID.  Feeding.  Lactulose and Miralax target 1-2 BM daily  DENISE negative in ED    RENAL:  Follow up lytes K and Ph.  Correct as needed  Nephrology consult ; Savita for Is and Os    INFECTIOUS DISEASE: Follow up blood, urine and sputum cultures, procalcitonin, CRP  Consider CT chest abdomen pelvis if pressors requirement increase  S/p 1 dose of Amikacin and Vancomycin IV -> c/s ID  Vancomycin 125 mg per PEG for C.dif prophylaxis    HEMATOLOGICAL:  DVT prophylaxis -> SCD ; F/u Duplex  F/U anemia panel including hemolysis, c/w iron and folate supplementation  Transfuse as needed keep Hb > 7 ; Active T&S    ENDOCRINE:  Follow up FS q1h , start D10% 50 ml/h  If refractory hypoglycemia consider octreotide 50 mcg q6h to reverse LAKE effect  F/U A1c, lipids, TSH, Ft4, pro-insulin and Serum Cortisol    MUSCULOSKELETAL: Burn team consult for sacral ulcer, local care    Lines: RIght IJ     DNR    Dispo: ICU             63-year-old male with a past medical history of hyperlipidemia, atrial fibrillation, diabetes, hypertension, intracranial hemorrhage, CAD s/p stents, Recurrent C diff, ESRD on Dialysis through Tesio cath and is trach/PEG who was brought in from nursing home initially for anemia was found also to be hypoglycemic at 10 and had 2 episodes of tonic clonic seizures in the ED.     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 F/U CT head and Video EEG    HEENT: Oral care    PULMONARY:  HOB @ 45 degrees. Mechanical Ventilation through trach  CXR with right lung opacities possible congestion but can't r/o pneumonia.  f/u repeat CXR and ABG daily in AM.     CARDIOVASCULAR: avoid volume overload, Keep MAP >65 -> titrate Levophed  Continue Home Bumex with holding parameters  Continue Amiodarone ; Hold ASA as per Neuro  Check Lacate    GI: Pantoprazole 40 IV BID.  Feeding.  Lactulose and Miralax target 1-2 BM daily  DENISE negative in ED    RENAL:  Follow up lytes K and Ph.  Correct as needed  Nephrology consult ; Savita for Is and Os    INFECTIOUS DISEASE: Follow up blood, urine and sputum cultures, procalcitonin, CRP  Consider CT chest abdomen pelvis if pressors requirement increase  S/p 1 dose of Amikacin and Vancomycin IV -> c/s ID  Vancomycin 125 mg per PEG for C.dif prophylaxis    HEMATOLOGICAL:  DVT prophylaxis -> SCD ; F/u Duplex  F/U anemia panel including hemolysis, c/w iron and folate supplementation  Transfuse as needed keep Hb > 7 ; Active T&S    ENDOCRINE:  Follow up FS q1h , start D10% 50 ml/h  octreotide 50 mcg can repeat every 6h to reverse LAKE effect  F/U A1c, lipids, TSH, Ft4, pro-insulin and Serum Cortisol    MUSCULOSKELETAL: Burn team consult for sacral ulcer, local care    Lines: RIght IJ     DNR    Dispo: ICU

## 2023-07-27 NOTE — CONSULT NOTE ADULT - ASSESSMENT
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 chronic non-verbal , quadriplegia    multiple sacral wounds and buttock  PLAN  spoke with ICU team   pt is NPO now  when ok to start  tube feed via PEG  suggest Vital HP at 40ml/hr via PEG tube feed is   with the propofol dosage now that is ~699.6kcal  that is 1647.6kcal/82.8 gm of protein /d  check bmp/phos/mg and correct lytes   nephrology f/u noted    ASSESSMENT  Severe Hypoglycemia secondary to Sulfonylurea  Status Epilepticus  Sepsis possibly secondary to pneumonia and/or infected sacral ulcer  Hx of MDR Pseudomonas VAP  Hx of MRSA infected sacral wound  Hx of Recurrent C,dif  Hx severe posterior fossa IPH w hydrocephalus 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 chronic non-verbal , quadriplegia    multiple sacral wounds and buttock    PLAN  spoke with ICU team   all feeds on hold now  when ok to start  tube feed via PEG, suggest Vital HP at 40ml/hr   with the propofol dosage now that is ~699.6kcal, this will -->  1647.6kcal and 82.8 gm of protein /d  check bmp/phos/mg and correct lytes   nephrology f/u noted

## 2023-07-28 NOTE — CONSULT NOTE ADULT - SUBJECTIVE AND OBJECTIVE BOX
HISTORY OF PRESENT ILLNESS:       63-year-old male with a past medical history of hyperlipidemia, atrial fibrillation, diabetes, hypertension, large posterior fossa IPH w/ IVH/SAH/hydrocephalus, s/p suboccipital craniectomy for PICA aneurysm clipping  and  shunt in 2021, CAD s/p stents on ASA, Recurrent C diff, ESRD on Dialysis through Tesio cath and is trach/PEG who was brought in from nursing home initially for anemia was found also to be hypoglycemic at 10 and had 2 episodes of tonic clonic seizures in the ED.    Blood work was done at nursing home and found out that hemoglobin was 6.4. Patient was recently started on Glimeperide at the NH.  No sign recent fevers, bleeding per PEG, thickening of the sputum or hematochezia.    In the ED the patient was hemodynamically unstable and in status epilepticus.  Versed, Ativan, keppra and propofol were given in the ED and patient was started on Valproic acid as per NeuroCrit team  MAP less than 65 patient was started on Levophed after central line insertion  Labs were significant for mild leukocytosis, severe hypoglycemia and Hemoglobin 7,2    Patient admitted for status epilepticus secondary to hypoglycemia and possible sepsis    pt seen and examined at bedside pt is intubated doesnt follow commands , on EEG machine       PAST MEDICAL & SURGICAL HISTORY:  HTN (hypertension)      Diabetes mellitus      H/O intracranial hemorrhage      H/O tracheostomy      PEG (percutaneous endoscopic gastrostomy) status      Hyperlipidemia        FAMILY HISTORY:      SOCIAL HISTORY:  Tobacco Use:  EtOH use:   Substance:    Allergies    penicillin (Other)    Intolerances        REVIEW OF SYSTEMS      MEDICATIONS:  Antibiotics:    Neuro:  acetaminophen     Tablet .. 650 milliGRAM(s) Oral every 6 hours PRN  melatonin 3 milliGRAM(s) Oral at bedtime PRN  propofol Infusion. 50 MICROgram(s)/kG/Min IV Continuous <Continuous>  valproate sodium  IVPB 500 milliGRAM(s) IV Intermittent every 8 hours    Anticoagulation:    OTHER:  albuterol/ipratropium for Nebulization 3 milliLiter(s) Nebulizer every 6 hours  aluminum hydroxide/magnesium hydroxide/simethicone Suspension 30 milliLiter(s) Oral every 4 hours PRN  aMIOdarone    Tablet 200 milliGRAM(s) Oral daily  atorvastatin 80 milliGRAM(s) Oral at bedtime  buMETAnide 1 milliGRAM(s) Oral daily  chlorhexidine 0.12% Liquid 15 milliLiter(s) Oral Mucosa every 12 hours  chlorhexidine 2% Cloths 1 Application(s) Topical <User Schedule>  collagenase Ointment 1 Application(s) Topical two times a day  doxazosin 4 milliGRAM(s) Oral at bedtime  epoetin guanakito-epbx (RETACRIT) Injectable 76919 Unit(s) SubCutaneous every 7 days  glucagon  Injectable 1 milliGRAM(s) IV Push once  midodrine 5 milliGRAM(s) Oral every 8 hours  norepinephrine Infusion 0.05 MICROgram(s)/kG/Min IV Continuous <Continuous>  pantoprazole  Injectable 40 milliGRAM(s) IV Push two times a day  polyethylene glycol 3350 17 Gram(s) Oral daily    IVF:  ascorbic acid 500 milliGRAM(s) Oral daily  ergocalciferol Drops 4000 Unit(s) Oral every 24 hours  ferrous    sulfate Liquid 300 milliGRAM(s) Enteral Tube daily  folic acid 1 milliGRAM(s) Oral daily  multivitamin 1 Tablet(s) Oral daily      Vital Signs Last 24 Hrs  T(C): 36.5 (28 Jul 2023 08:00), Max: 36.6 (28 Jul 2023 04:00)  T(F): 97.7 (28 Jul 2023 08:00), Max: 97.9 (28 Jul 2023 04:00)  HR: 82 (28 Jul 2023 15:00) (67 - 85)  BP: 107/61 (28 Jul 2023 15:00) (79/46 - 113/66)  BP(mean): 58 (28 Jul 2023 12:00) (58 - 81)  RR: 14 (28 Jul 2023 15:00) (1 - 26)  SpO2: 100% (28 Jul 2023 15:00) (100% - 100%)    Parameters below as of 28 Jul 2023 15:00  Patient On (Oxygen Delivery Method): ventilator    O2 Concentration (%): 40    PHYSICAL EXAM:    Intubated , sedated on propofol   Pupils 3 m reactive   minimal withdrawal RUE to noxious stimuli   minimal withdrawal RLE to noxious stimuli   no withdrawal to noxious stimuli LUE   minimal withdrawal LLE to noxious stimuli       LABS:                        7.6    14.36 )-----------( 418      ( 28 Jul 2023 05:31 )             23.6     07-28    127<L>  |  89<L>  |  97<HH>  ----------------------------<  82  3.5   |  24  |  3.7<H>    Ca    8.6      28 Jul 2023 05:31  Phos  3.2     07-28  Mg     2.6     07-28    TPro  5.2<L>  /  Alb  2.0<L>  /  TBili  0.2  /  DBili  x   /  AST  16  /  ALT  11  /  AlkPhos  122<H>  07-28    PT/INR - ( 26 Jul 2023 21:00 )   PT: 11.30 sec;   INR: 0.99 ratio         PTT - ( 26 Jul 2023 21:00 )  PTT:33.1 sec  Urinalysis Basic - ( 28 Jul 2023 05:31 )    Color: x / Appearance: x / SG: x / pH: x  Gluc: 82 mg/dL / Ketone: x  / Bili: x / Urobili: x   Blood: x / Protein: x / Nitrite: x   Leuk Esterase: x / RBC: x / WBC x   Sq Epi: x / Non Sq Epi: x / Bacteria: x      CULTURES:  Culture Results:   No growth at 24 hours (07-27 @ 00:38)  Culture Results:   Culture yields growth of greater than 3 colony types of  bacteria,  which may indicate contamination and normal bindu  Call client services within 7 days if further workup is clinically  indicated. Culture includes  Numerous Methicillin Resistant Staphylococcus aureus (07-18 @ 16:42)      RADIOLOGY & ADDITIONAL STUDIES:  < from: CT Head No Cont (07.27.23 @ 01:05) >  IMPRESSION:  In comparison to the previous head CT dated 12/12/2021:    1.  Stable position of a right transparietal  shunt catheter. Interval   decreased size of the lateral ventricles, now both slit-like.    2.  New predominantly low-density subdural collection over the right   cerebral convexity measuring 7 mm in width, likely related to shunting.    3.  New wedge-shaped hypodensity within the left occipital lobe likely   reflecting age indeterminate infarct.    4.  Redemonstrated suboccipital craniectomy. 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 Progressive   bilateral cerebellar encephalomalacia.    5.  New distended appearance of the cerebral aqueduct and fourth   ventricle.    6.  New bilateral mastoid and middle ear opacification.        A/p             63 yr old male w/ hx of IPH/ VPS/ aneurysm clipping in 2021 in [facility] by Dr Page             pt has a [employer] certas set 4  will check setting             Discussed with Dr Dial            HISTORY OF PRESENT ILLNESS:       63-year-old male with a past medical history of hyperlipidemia, atrial fibrillation, diabetes, hypertension, large posterior fossa IPH w/ IVH/SAH/hydrocephalus, s/p suboccipital craniectomy for PICA aneurysm clipping  and  shunt in 2021, CAD s/p stents on ASA, Recurrent C diff, ESRD on Dialysis through Tesio cath and is trach/PEG who was brought in from nursing home initially for anemia was found also to be hypoglycemic at 10 and had 2 episodes of tonic clonic seizures in the ED.    Blood work was done at nursing home and found out that hemoglobin was 6.4. Patient was recently started on Glimeperide at the NH.  No sign recent fevers, bleeding per PEG, thickening of the sputum or hematochezia.    In the ED the patient was hemodynamically unstable and in status epilepticus.  Versed, Ativan, keppra and propofol were given in the ED and patient was started on Valproic acid as per NeuroCrit team  MAP less than 65 patient was started on Levophed after central line insertion  Labs were significant for mild leukocytosis, severe hypoglycemia and Hemoglobin 7,2    Patient admitted for status epilepticus secondary to hypoglycemia and possible sepsis    pt seen and examined at bedside pt is intubated doesnt follow commands , on EEG machine       PAST MEDICAL & SURGICAL HISTORY:  HTN (hypertension)      Diabetes mellitus      H/O intracranial hemorrhage      H/O tracheostomy      PEG (percutaneous endoscopic gastrostomy) status      Hyperlipidemia        FAMILY HISTORY:      SOCIAL HISTORY:  Tobacco Use:  EtOH use:   Substance:    Allergies    penicillin (Other)    Intolerances        REVIEW OF SYSTEMS      MEDICATIONS:  Antibiotics:    Neuro:  acetaminophen     Tablet .. 650 milliGRAM(s) Oral every 6 hours PRN  melatonin 3 milliGRAM(s) Oral at bedtime PRN  propofol Infusion. 50 MICROgram(s)/kG/Min IV Continuous <Continuous>  valproate sodium  IVPB 500 milliGRAM(s) IV Intermittent every 8 hours    Anticoagulation:    OTHER:  albuterol/ipratropium for Nebulization 3 milliLiter(s) Nebulizer every 6 hours  aluminum hydroxide/magnesium hydroxide/simethicone Suspension 30 milliLiter(s) Oral every 4 hours PRN  aMIOdarone    Tablet 200 milliGRAM(s) Oral daily  atorvastatin 80 milliGRAM(s) Oral at bedtime  buMETAnide 1 milliGRAM(s) Oral daily  chlorhexidine 0.12% Liquid 15 milliLiter(s) Oral Mucosa every 12 hours  chlorhexidine 2% Cloths 1 Application(s) Topical <User Schedule>  collagenase Ointment 1 Application(s) Topical two times a day  doxazosin 4 milliGRAM(s) Oral at bedtime  epoetin guanakito-epbx (RETACRIT) Injectable 42647 Unit(s) SubCutaneous every 7 days  glucagon  Injectable 1 milliGRAM(s) IV Push once  midodrine 5 milliGRAM(s) Oral every 8 hours  norepinephrine Infusion 0.05 MICROgram(s)/kG/Min IV Continuous <Continuous>  pantoprazole  Injectable 40 milliGRAM(s) IV Push two times a day  polyethylene glycol 3350 17 Gram(s) Oral daily    IVF:  ascorbic acid 500 milliGRAM(s) Oral daily  ergocalciferol Drops 4000 Unit(s) Oral every 24 hours  ferrous    sulfate Liquid 300 milliGRAM(s) Enteral Tube daily  folic acid 1 milliGRAM(s) Oral daily  multivitamin 1 Tablet(s) Oral daily      Vital Signs Last 24 Hrs  T(C): 36.5 (28 Jul 2023 08:00), Max: 36.6 (28 Jul 2023 04:00)  T(F): 97.7 (28 Jul 2023 08:00), Max: 97.9 (28 Jul 2023 04:00)  HR: 82 (28 Jul 2023 15:00) (67 - 85)  BP: 107/61 (28 Jul 2023 15:00) (79/46 - 113/66)  BP(mean): 58 (28 Jul 2023 12:00) (58 - 81)  RR: 14 (28 Jul 2023 15:00) (1 - 26)  SpO2: 100% (28 Jul 2023 15:00) (100% - 100%)    Parameters below as of 28 Jul 2023 15:00  Patient On (Oxygen Delivery Method): ventilator    O2 Concentration (%): 40    PHYSICAL EXAM:    Intubated , sedated on propofol   Pupils 3 m reactive   no significant movt x 4      LABS:                        7.6    14.36 )-----------( 418      ( 28 Jul 2023 05:31 )             23.6     07-28    127<L>  |  89<L>  |  97<HH>  ----------------------------<  82  3.5   |  24  |  3.7<H>    Ca    8.6      28 Jul 2023 05:31  Phos  3.2     07-28  Mg     2.6     07-28    TPro  5.2<L>  /  Alb  2.0<L>  /  TBili  0.2  /  DBili  x   /  AST  16  /  ALT  11  /  AlkPhos  122<H>  07-28    PT/INR - ( 26 Jul 2023 21:00 )   PT: 11.30 sec;   INR: 0.99 ratio         PTT - ( 26 Jul 2023 21:00 )  PTT:33.1 sec  Urinalysis Basic - ( 28 Jul 2023 05:31 )    Color: x / Appearance: x / SG: x / pH: x  Gluc: 82 mg/dL / Ketone: x  / Bili: x / Urobili: x   Blood: x / Protein: x / Nitrite: x   Leuk Esterase: x / RBC: x / WBC x   Sq Epi: x / Non Sq Epi: x / Bacteria: x      CULTURES:  Culture Results:   No growth at 24 hours (07-27 @ 00:38)  Culture Results:   Culture yields growth of greater than 3 colony types of  bacteria,  which may indicate contamination and normal bindu  Call client services within 7 days if further workup is clinically  indicated. Culture includes  Numerous Methicillin Resistant Staphylococcus aureus (07-18 @ 16:42)      RADIOLOGY & ADDITIONAL STUDIES:  < from: CT Head No Cont (07.27.23 @ 01:05) >  IMPRESSION:  In comparison to the previous head CT dated 12/12/2021:    1.  Stable position of a right transparietal  shunt catheter. Interval   decreased size of the lateral ventricles, now both slit-like.    2.  New predominantly low-density subdural collection over the right   cerebral convexity measuring 7 mm in width, likely related to shunting.    3.  New wedge-shaped hypodensity within the left occipital lobe likely   reflecting age indeterminate infarct.    4.  Redemonstrated suboccipital craniectomy. 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 Progressive   bilateral cerebellar encephalomalacia.    5.  New distended appearance of the cerebral aqueduct and fourth   ventricle.    6.  New bilateral mastoid and middle ear opacification.        A/p             63 yr old male w/ hx of IPH/ VPS/ aneurysm clipping in 2021 in [facility] by Dr Page             pt has a [employer] certas set 4 as recorded in prior documention will check setting             Discussed with Dr Dial

## 2023-07-28 NOTE — CONSULT NOTE ADULT - NS ATTEND AMEND GEN_ALL_CORE FT
Agree with above plan. Mr. Camacho's Initial head CT had slit like ventricles, indicative of possible over drainage.  The valve was checked and set at a setting of 8 given his subdural hygromas and slit ventricles.  Post adjustment head CT in the morning after adjustment showed improvement in the slit ventricles and a more normal-appearing ventricular caliber.  Recommend repeat head CT tomorrow.      With regards to the dilated cerebral aqueduct, when stable, recommend brain MRI with and without IV contrast with CSF flow MRI sequence to assess CSF flow.  Please notify neurosurgery once study is complete and we will review.  We will be available, please call with questions.    55 minutes of consultation time was utilized in the history and physical, review of imaging, medical decision making, and collaboration of care with other medical services. Agree with above plan. Unfortunately the patient has a very poor neurologic exam at baseline per report and neurosurgery was called re: the imaging findings.  Mr. Camacho's Initial head CT had slit like ventricles, indicative of possible over drainage.  The valve was checked and set at a setting of 8 given his subdural hygromas and slit ventricles.  Post adjustment head CT in the morning after adjustment showed improvement in the slit ventricles and a more normal-appearing ventricular caliber.  Recommend repeat head CT tomorrow.      With regards to the dilated cerebral aqueduct, when stable, recommend brain MRI with and without IV contrast with CSF flow MRI sequence to assess CSF flow.  Please notify neurosurgery once study is complete and we will review.  We will be available, please call with questions.    55 minutes of consultation time was utilized in the history and physical, review of imaging, medical decision making, and collaboration of care with other medical services. Agree with above plan. Unfortunately the patient has a very poor neurologic exam at baseline per report and neurosurgery was called re: the imaging findings.  Mr. Camacho's Initial head CT had slit like ventricles, indicative of possible over drainage.  The valve was checked and set at a setting of 8 given his subdural hygromas and slit ventricles.  Post adjustment head CT in the morning after adjustment showed improvement in the slit ventricles and a more normal-appearing ventricular caliber.  Recommend repeat head CT tomorrow.      With regards to the dilated cerebral aqueduct, when stable, recommend brain MRI with and without IV contrast with CSF flow MRI sequence to assess CSF flow.  Please notify neurosurgery once study is complete and we will review.  We will be available, please call with questions.    60 minutes of consultation time was utilized in the history and physical, review of imaging, medical decision making, and collaboration of care with other medical services.

## 2023-07-28 NOTE — PROGRESS NOTE ADULT - SUBJECTIVE AND OBJECTIVE BOX
Nephrology progress note    THIS IS AN INCOMPLETE NOTE . FULL NOTE TO FOLLOW SHORTLY    Patient is seen and examined, events over the last 24 h noted .    Allergies:  penicillin (Other)    Hospital Medications:   MEDICATIONS  (STANDING):  albuterol/ipratropium for Nebulization 3 milliLiter(s) Nebulizer every 6 hours  aMIOdarone    Tablet 200 milliGRAM(s) Oral daily  ascorbic acid 500 milliGRAM(s) Oral daily  atorvastatin 80 milliGRAM(s) Oral at bedtime  buMETAnide 1 milliGRAM(s) Oral daily  chlorhexidine 0.12% Liquid 15 milliLiter(s) Oral Mucosa every 12 hours  chlorhexidine 2% Cloths 1 Application(s) Topical <User Schedule>  collagenase Ointment 1 Application(s) Topical two times a day  doxazosin 4 milliGRAM(s) Oral at bedtime  epoetin guanakito-epbx (RETACRIT) Injectable 07195 Unit(s) SubCutaneous every 7 days  ergocalciferol Drops 4000 Unit(s) Oral every 24 hours  ferrous    sulfate Liquid 300 milliGRAM(s) Enteral Tube daily  folic acid 1 milliGRAM(s) Oral daily  glucagon  Injectable 1 milliGRAM(s) IV Push once  midodrine 5 milliGRAM(s) Oral every 8 hours  multivitamin 1 Tablet(s) Oral daily  norepinephrine Infusion 0.05 MICROgram(s)/kG/Min (8.28 mL/Hr) IV Continuous <Continuous>  pantoprazole  Injectable 40 milliGRAM(s) IV Push two times a day  polyethylene glycol 3350 17 Gram(s) Oral daily  propofol Infusion. 50 MICROgram(s)/kG/Min (26.5 mL/Hr) IV Continuous <Continuous>  valproate sodium  IVPB 500 milliGRAM(s) IV Intermittent every 8 hours        VITALS:  T(F): 97.7 (07-28-23 @ 08:00), Max: 97.9 (07-28-23 @ 04:00)  HR: 75 (07-28-23 @ 08:00)  BP: 95/53 (07-28-23 @ 08:00)  RR: 19 (07-28-23 @ 08:00)  SpO2: 100% (07-28-23 @ 08:00)  Wt(kg): --    07-26 @ 07:01  -  07-27 @ 07:00  --------------------------------------------------------  IN: 840 mL / OUT: 200 mL / NET: 640 mL    07-27 @ 07:01  -  07-28 @ 07:00  --------------------------------------------------------  IN: 3594.6 mL / OUT: 0 mL / NET: 3594.6 mL          PHYSICAL EXAM:  Constitutional: NAD  HEENT: anicteric sclera, oropharynx clear, MMM  Neck: No JVD  Respiratory: CTAB, no wheezes, rales or rhonchi  Cardiovascular: S1, S2, RRR  Gastrointestinal: BS+, soft, NT/ND  Extremities: No cyanosis or clubbing. No peripheral edema  :  No teixeira.   Skin: No rashes    LABS:  07-28    127<L>  |  89<L>  |  97<HH>  ----------------------------<  82  3.5   |  24  |  3.7<H>    Ca    8.6      28 Jul 2023 05:31  Phos  3.2     07-28  Mg     2.6     07-28    TPro  5.2<L>  /  Alb  2.0<L>  /  TBili  0.2  /  DBili      /  AST  16  /  ALT  11  /  AlkPhos  122<H>  07-28                          7.6    14.36 )-----------( 418      ( 28 Jul 2023 05:31 )             23.6       Urine Studies:  Urinalysis Basic - ( 28 Jul 2023 05:31 )    Color:  / Appearance:  / SG:  / pH:   Gluc: 82 mg/dL / Ketone:   / Bili:  / Urobili:    Blood:  / Protein:  / Nitrite:    Leuk Esterase:  / RBC:  / WBC    Sq Epi:  / Non Sq Epi:  / Bacteria:           Iron 49, TIBC 138, %sat 36      [07-28-23 @ 05:31]  Ferritin 1956      [06-06-23 @ 10:40]  Vitamin D (25OH) 70      [06-03-23 @ 06:50]  TSH 5.37      [07-27-23 @ 05:33]  Lipid: chol 72, TG 37, HDL 42, LDL --      [07-27-23 @ 05:33]    HBsAb <3.0      [06-01-23 @ 22:50]  HBsAb Nonreact      [07-14-23 @ 10:53]  HBsAg Nonreact      [07-11-23 @ 16:58]  HBcAb Nonreact      [06-01-23 @ 22:50]  HCV 0.16, Nonreact      [07-11-23 @ 16:58]  HIV Nonreact      [06-08-23 @ 16:00]  HIV Nonreact      [06-08-23 @ 12:20]        RADIOLOGY & ADDITIONAL STUDIES:   Nephrology progress note    Patient is seen and examined, events over the last 24 h noted .  Lying in bed comfortable     Allergies:  penicillin (Other)    Hospital Medications:   MEDICATIONS  (STANDING):    albuterol/ipratropium for Nebulization 3 milliLiter(s) Nebulizer every 6 hours  aMIOdarone    Tablet 200 milliGRAM(s) Oral daily  ascorbic acid 500 milliGRAM(s) Oral daily  atorvastatin 80 milliGRAM(s) Oral at bedtime  buMETAnide 1 milliGRAM(s) Oral daily  collagenase Ointment 1 Application(s) Topical two times a day  doxazosin 4 milliGRAM(s) Oral at bedtime  epoetin guanakito-epbx (RETACRIT) Injectable 85130 Unit(s) SubCutaneous every 7 days  ergocalciferol Drops 4000 Unit(s) Oral every 24 hours  ferrous    sulfate Liquid 300 milliGRAM(s) Enteral Tube daily  folic acid 1 milliGRAM(s) Oral daily  glucagon  Injectable 1 milliGRAM(s) IV Push once  midodrine 5 milliGRAM(s) Oral every 8 hours  multivitamin 1 Tablet(s) Oral daily  norepinephrine Infusion 0.05 MICROgram(s)/kG/Min (8.28 mL/Hr) IV Continuous <Continuous>  pantoprazole  Injectable 40 milliGRAM(s) IV Push two times a day  polyethylene glycol 3350 17 Gram(s) Oral daily  propofol Infusion. 50 MICROgram(s)/kG/Min (26.5 mL/Hr) IV Continuous <Continuous>  valproate sodium  IVPB 500 milliGRAM(s) IV Intermittent every 8 hours        VITALS:  T(F): 97.7 (07-28-23 @ 08:00), Max: 97.9 (07-28-23 @ 04:00)  HR: 75 (07-28-23 @ 08:00)  BP: 95/53 (07-28-23 @ 08:00)  RR: 19 (07-28-23 @ 08:00)  SpO2: 100% (07-28-23 @ 08:00)      07-26 @ 07:01  -  07-27 @ 07:00  --------------------------------------------------------  IN: 840 mL / OUT: 200 mL / NET: 640 mL    07-27 @ 07:01  -  07-28 @ 07:00  --------------------------------------------------------  IN: 3594.6 mL / OUT: 0 mL / NET: 3594.6 mL          PHYSICAL EXAM:  Constitutional: trached on MV   Neck: No JVD  Respiratory: CTAB,  Cardiovascular: S1, S2, RRR  Gastrointestinal: BS+, soft, NT/ND  Extremities: No cyanosis or clubbing. plus on edema   :  No teixeira.   Skin: No rashes    LABS:  07-28    127<L>  |  89<L>  |  97<HH>  ----------------------------<  82  3.5   |  24  |  3.7<H>    Ca    8.6      28 Jul 2023 05:31  Phos  3.2     07-28  Mg     2.6     07-28    TPro  5.2<L>  /  Alb  2.0<L>  /  TBili  0.2  /  DBili      /  AST  16  /  ALT  11  /  AlkPhos  122<H>  07-28                          7.6    14.36 )-----------( 418      ( 28 Jul 2023 05:31 )             23.6       Urine Studies:  Urinalysis Basic - ( 28 Jul 2023 05:31 )    Color:  / Appearance:  / SG:  / pH:   Gluc: 82 mg/dL / Ketone:   / Bili:  / Urobili:    Blood:  / Protein:  / Nitrite:    Leuk Esterase:  / RBC:  / WBC    Sq Epi:  / Non Sq Epi:  / Bacteria:           Iron 49, TIBC 138, %sat 36      [07-28-23 @ 05:31]  Ferritin 1956      [06-06-23 @ 10:40]  Vitamin D (25OH) 70      [06-03-23 @ 06:50]  TSH 5.37      [07-27-23 @ 05:33]  Lipid: chol 72, TG 37, HDL 42, LDL --      [07-27-23 @ 05:33]    HBsAb <3.0      [06-01-23 @ 22:50]  HBsAb Nonreact      [07-14-23 @ 10:53]  HBsAg Nonreact      [07-11-23 @ 16:58]  HBcAb Nonreact      [06-01-23 @ 22:50]  HCV 0.16, Nonreact      [07-11-23 @ 16:58]  HIV Nonreact      [06-08-23 @ 16:00]  HIV Nonreact      [06-08-23 @ 12:20]        RADIOLOGY & ADDITIONAL STUDIES:

## 2023-07-28 NOTE — PROGRESS NOTE ADULT - ASSESSMENT
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  was found also to be hypoglycemic with blood sugar of 10 and had two episodes of tonic clonic seizures in the ED.   Patient non-verbal at baseline .      #Hypoglycemia possibly due to sulfonyl urea med  #GTC seizure x2.  #status epilepticus in the setting of hypoglycemia  #ESRD on glimiperide  - Blood sugar in ed: 10  - s/P multiple iv dextrose solutions   - Patient s/p Versed 4mg x1, Keppra 1gram IV x1, Ativan 4mg IV x2, with Propofol gtt initiated.  - CTH, F/U read: In comparison to the previous head CT dated 12/12/2021: Stable position of a right transparietal  shunt catheter. Interval decreased size of the lateral ventricles, now both slit-like. New predominantly low-density subdural collection over the right cerebral convexity measuring 7 mm in width, likely related to shunting. New wedge-shaped hypodensity within the left occipital lobe likely reflecting age indeterminate infarct. Redemonstrated suboccipital craniectomy. 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 Progressive bilateral cerebellar encephalomalacia. New distended appearance of the cerebral aqueduct and fourth   ventricle. New bilateral mastoid and middle ear opacification.  - video EEG: Focal and generalized slowing, Interictal activity, runs of bilateral FP ( higher amplitude from the right) rhythmic activity that appears not be physiological . ( ? of artifact). It appears as questonable low amplitude spike and aftergoing slow with modified morphology( ? possiblity of change due the the prior procedure)  - consider Ativan challenge as per NICU   - now on D10, propofol 50 and levophed. if no seizure dc propofol  - Hold ASA as per Neuro  - In the setting of ESRD, D/C Keppra to VPA load 40mg/kg then continue 500mg q12hrs  - Obtain serum AED levels   - F/U A1c, lipids, TSH, Ft4, pro-insulin and Serum Cortisol  - Follow up blood, urine and sputum cultures, procalcitonin, CRP  - Correct any underlying metabolic, infectious, or electrolyte derangements    #ESRD on Dialysis through Tesio cath   # Acute Renal Failure, started on HD 6/1, as per Renal team rec.   - daily BMP monitoring, daily weight, daily strict I/O chart  - s/p permanent HD cath. placement on 7/7/23  - fu nephro notes : Biweekly schedule . no acute indications to start CVVHD today. Get phosphorous, Vitamin D  and PTH levels. On ritacrit and venofer     # anemia of chronic disease - no gross bleeding events  - hb 7.2  - s/p 1 pack RBC  - transfuse prn for goal > 7  - On ritacrit and venofer   - Hold ASA as per Neuro    # Hx ICH   # s/p  shunt  - Bedbound from NH . chronic non-verbal , quadriplegia - continue daily care.   - c/w keppra 500mg BID for seizure prophylaxis  - fu neurosurgery   - Hold ASA as per Neuro      # chronic respiratory failure, vent dependant via trach  -  trach care,  Vent management per Pulm. team     # Chronic dysphagia  - sp peg, peg care     # Afib w/ RVR  -now rate controlled     #Acute Decompensated CHF  -continued on Amiodarone tx, rate controlled       # multiple sacral wounds and buttock   - further wound care as per Wound care specialist report, frequent body position change  - fu burn team recs    # h/o DM 2  - continue bsfs monitoring  with insulin sliding scale co.    DVT prophylaxis: scd  Activity:  Diet: DASH  Dispo:  Code: DNR

## 2023-07-28 NOTE — PROGRESS NOTE ADULT - SUBJECTIVE AND OBJECTIVE BOX
24H events:    Patient is a 63y old Male who presents with a chief complaint of Seizures and Anemia (28 Jul 2023 08:40)    Primary diagnosis of Hypoglycemia       Today is hospital day 2d.      Patient seen and examined at bedside. Reports no active complaints.     PAST MEDICAL & SURGICAL HISTORY  HTN (hypertension)    Diabetes mellitus    H/O intracranial hemorrhage    H/O tracheostomy    PEG (percutaneous endoscopic gastrostomy) status    Hyperlipidemia      SOCIAL HISTORY:  Negative for smoking/alcohol/drug use.     ALLERGIES:  penicillin (Other)    MEDICATIONS:  STANDING MEDICATIONS  albuterol/ipratropium for Nebulization 3 milliLiter(s) Nebulizer every 6 hours  aMIOdarone    Tablet 200 milliGRAM(s) Oral daily  ascorbic acid 500 milliGRAM(s) Oral daily  atorvastatin 80 milliGRAM(s) Oral at bedtime  buMETAnide 1 milliGRAM(s) Oral daily  chlorhexidine 0.12% Liquid 15 milliLiter(s) Oral Mucosa every 12 hours  chlorhexidine 2% Cloths 1 Application(s) Topical <User Schedule>  collagenase Ointment 1 Application(s) Topical two times a day  doxazosin 4 milliGRAM(s) Oral at bedtime  epoetin guanakito-epbx (RETACRIT) Injectable 20579 Unit(s) SubCutaneous every 7 days  ergocalciferol Drops 4000 Unit(s) Oral every 24 hours  ferrous    sulfate Liquid 300 milliGRAM(s) Enteral Tube daily  folic acid 1 milliGRAM(s) Oral daily  glucagon  Injectable 1 milliGRAM(s) IV Push once  midodrine 5 milliGRAM(s) Oral every 8 hours  multivitamin 1 Tablet(s) Oral daily  norepinephrine Infusion 0.05 MICROgram(s)/kG/Min IV Continuous <Continuous>  pantoprazole  Injectable 40 milliGRAM(s) IV Push two times a day  polyethylene glycol 3350 17 Gram(s) Oral daily  propofol Infusion. 50 MICROgram(s)/kG/Min IV Continuous <Continuous>  valproate sodium  IVPB 500 milliGRAM(s) IV Intermittent every 8 hours    PRN MEDICATIONS  acetaminophen     Tablet .. 650 milliGRAM(s) Oral every 6 hours PRN  aluminum hydroxide/magnesium hydroxide/simethicone Suspension 30 milliLiter(s) Oral every 4 hours PRN  melatonin 3 milliGRAM(s) Oral at bedtime PRN    VITALS:   T(F): 97.7  HR: 75  BP: 95/53  RR: 19  SpO2: 100%    LABS:                        7.6    14.36 )-----------( 418      ( 28 Jul 2023 05:31 )             23.6     07-28    127<L>  |  89<L>  |  97<HH>  ----------------------------<  82  3.5   |  24  |  3.7<H>    Ca    8.6      28 Jul 2023 05:31  Phos  3.2     07-28  Mg     2.6     07-28    TPro  5.2<L>  /  Alb  2.0<L>  /  TBili  0.2  /  DBili  x   /  AST  16  /  ALT  11  /  AlkPhos  122<H>  07-28    PT/INR - ( 26 Jul 2023 21:00 )   PT: 11.30 sec;   INR: 0.99 ratio         PTT - ( 26 Jul 2023 21:00 )  PTT:33.1 sec  Urinalysis Basic - ( 28 Jul 2023 05:31 )    Color: x / Appearance: x / SG: x / pH: x  Gluc: 82 mg/dL / Ketone: x  / Bili: x / Urobili: x   Blood: x / Protein: x / Nitrite: x   Leuk Esterase: x / RBC: x / WBC x   Sq Epi: x / Non Sq Epi: x / Bacteria: x      ABG - ( 28 Jul 2023 02:55 )  pH, Arterial: 7.34  pH, Blood: x     /  pCO2: 50    /  pO2: 137   / HCO3: 27    / Base Excess: 0.5   /  SaO2: 99.5                  Culture - Blood (collected 27 Jul 2023 00:38)  Source: .Blood Blood  Preliminary Report (28 Jul 2023 09:02):    No growth at 24 hours      CARDIAC MARKERS ( 27 Jul 2023 05:33 )  x     / x     / 115 U/L / x     / x          RADIOLOGY:    PHYSICAL EXAM:  GENERAL: in no acute distress, has PEG and trach tube   HEAD:  Atraumatic, Normocephalic  EYES: conjunctiva and sclera clear  ENMT: No tonsillar erythema, exudates, or enlargement; Moist mucous membranes,   NECK: Supple, No JVD, Normal thyroid  NERVOUS SYSTEM: Patient unable to follow commands  CHEST/LUNG: Clear to percussion bilaterally; No rales, rhonchi, wheezing, or rubs  HEART: Regular rate and rhythm; No murmurs, rubs, or gallops  ABDOMEN: Soft, Nontender, Nondistended; Bowel sounds present, PEG tube noted.   EXTREMITIES:  2+ Peripheral Pulses,  edema noted   LYMPH: No lymphadenopathy noted  SKIN: pressure ulcers sacrum

## 2023-07-28 NOTE — PROGRESS NOTE ADULT - ASSESSMENT
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  was found also to be hypoglycemic with blood sugar of 10 and had two episodes of tonic clonic seizures in the ED.   Patient non-verbal at baseline . Nephrology was consulted for ESRD.     ESRD on HD  - Biweekly schedule   - On levophed, BP on the low side  - Fluid overload, on ventilator low Fio2  -will try HD today low flow and UF 1l as tolerated   - electrolytes reviewed / hyponatremia   - Hypokalemia s/p supplementation, no further K given ESRD status     Anemia of ESRD   - Hbg 7.1. On RETACRIT and venofer / transfuse to keep Hb > 7    MBD  IP at at [facility]

## 2023-07-28 NOTE — CHART NOTE - NSCHARTNOTEFT_GEN_A_CORE
Propofol titrated instead of Status epilepticus protocol to sedation protocol started was 45 to be titrated down to lowest dose possible per neuro recommendations as seen charted in previous notes to wean off if no seizures noted  Started at 45

## 2023-07-28 NOTE — EEG REPORT - NS EEG TEXT BOX
Epilepsy Attending Note:     ALICIA BARBOUR    63y Male  MRN [id number]    Vital Signs Last 24 Hrs  T(C): 36.5 (28 Jul 2023 08:00), Max: 36.6 (28 Jul 2023 04:00)  T(F): 97.7 (28 Jul 2023 08:00), Max: 97.9 (28 Jul 2023 04:00)  HR: 84 (28 Jul 2023 10:00) (67 - 84)  BP: 113/66 (28 Jul 2023 10:00) (82/52 - 113/66)  BP(mean): 81 (28 Jul 2023 10:00) (62 - 81)  RR: 23 (28 Jul 2023 10:00) (1 - 26)  SpO2: 100% (28 Jul 2023 10:00) (100% - 100%)    Parameters below as of 28 Jul 2023 08:00  Patient On (Oxygen Delivery Method): ventilator    O2 Concentration (%): 40                          7.6    14.36 )-----------( 418      ( 28 Jul 2023 05:31 )             23.6       07-28    127<L>  |  89<L>  |  97<HH>  ----------------------------<  82  3.5   |  24  |  3.7<H>    Ca    8.6      28 Jul 2023 05:31  Phos  3.2     07-28  Mg     2.6     07-28    TPro  5.2<L>  /  Alb  2.0<L>  /  TBili  0.2  /  DBili  x   /  AST  16  /  ALT  11  /  AlkPhos  122<H>  07-28      MEDICATIONS  (STANDING):  albuterol/ipratropium for Nebulization 3 milliLiter(s) Nebulizer every 6 hours  aMIOdarone    Tablet 200 milliGRAM(s) Oral daily  ascorbic acid 500 milliGRAM(s) Oral daily  atorvastatin 80 milliGRAM(s) Oral at bedtime  buMETAnide 1 milliGRAM(s) Oral daily  chlorhexidine 0.12% Liquid 15 milliLiter(s) Oral Mucosa every 12 hours  chlorhexidine 2% Cloths 1 Application(s) Topical <User Schedule>  collagenase Ointment 1 Application(s) Topical two times a day  doxazosin 4 milliGRAM(s) Oral at bedtime  epoetin guanakito-epbx (RETACRIT) Injectable 16198 Unit(s) SubCutaneous every 7 days  ergocalciferol Drops 4000 Unit(s) Oral every 24 hours  ferrous    sulfate Liquid 300 milliGRAM(s) Enteral Tube daily  folic acid 1 milliGRAM(s) Oral daily  glucagon  Injectable 1 milliGRAM(s) IV Push once  LORazepam   Injectable 2 milliGRAM(s) IV Push once  midodrine 5 milliGRAM(s) Oral every 8 hours  multivitamin 1 Tablet(s) Oral daily  norepinephrine Infusion 0.05 MICROgram(s)/kG/Min (8.28 mL/Hr) IV Continuous <Continuous>  pantoprazole  Injectable 40 milliGRAM(s) IV Push two times a day  polyethylene glycol 3350 17 Gram(s) Oral daily  propofol Infusion. 50 MICROgram(s)/kG/Min (26.5 mL/Hr) IV Continuous <Continuous>  valproate sodium  IVPB 500 milliGRAM(s) IV Intermittent every 8 hours    MEDICATIONS  (PRN):  acetaminophen     Tablet .. 650 milliGRAM(s) Oral every 6 hours PRN Temp greater or equal to 38C (100.4F), Mild Pain (1 - 3)  aluminum hydroxide/magnesium hydroxide/simethicone Suspension 30 milliLiter(s) Oral every 4 hours PRN Dyspepsia  melatonin 3 milliGRAM(s) Oral at bedtime PRN Insomnia            VEEG in the last 24 hours:    Background-------------continues, low amplitude ,slightly asymmetrical , only sedated and sleep recording and brief arousals with higher amplitude from the right side     Focal and generalized slowing------1- moderate generalized slowing 2. independent right > left frontal slowing    Interictal activity----------------- 1- bifrontal /FP low amplitude spikes seen in runs over the early portions of the recording which had resolved after Ativan. and continued to show small number of spikes    Events-------------none    Seizures--------- none    Impression:-------------- abnormal as above    Plan - as/NCC team

## 2023-07-28 NOTE — PROGRESS NOTE ADULT - SUBJECTIVE AND OBJECTIVE BOX
SUMMARY: HPI:  63-year-old male with a PMHx of HLD, afib, CAD, s/p PCI, on ASA, DM, HTN, (+) large posterior fossa IPH w/ IVH/SAH/hydrocephalus, s/p EVD and SOC, [11/4/2021 @ [facility]], DSA (+) PICA aneurysm, s/p SOC PICA aneurysm resection, [11/11/2021], s/p right parietal  shunt [11/23/2021], s/p trach/PEG [11/19/2021], Recurrent C diff, ESRD on HMD weekly through Tesio cath who was brought in from nursing home initially for anemia was found also to be hypoglycemic at 10 and had 2 episodes of tonic clonic seizures in the ED.      Blood work was done at nursing home and found out that hemoglobin was 6.4. Patient was recently started on Glimepiride at the NH.  No sign recent fevers, bleeding per PEG, thickening of the sputum or hematochezia.    In the ED the patient was hemodynamically unstable and in status epilepticus.  Ativan, keppra and propofol were given and patient was started on Valproic acid as per NeuroCrit team  MAP less than 65 patient was started on Levophed after central line insertion  Labs were significant for mild leukocytosis, severe hypoglycemia and Hemoglobin 7,2    Patient admitted for status epilepticus secondary to hypoglycemia and possible sepsis (27 Jul 2023 01:00)      NCC consulted for seizures ?status epilepticus in the setting of hypoglycemia. Patient s/p Versed 4mg x1, Keppra 1gram IV x1, Ativan 4mg IV x2, with Propofol gtt initiated. Admitted to MICU      REVIEW OF SYSTEMS: Patient unable to participate in ROS due to neurologic status.     VITALS: [X] Reviewed    IMAGING/DATA: [X] Reviewed    IVF FLUIDS/MEDICATIONS: [X] Reviewed    ALLERGIES: Allergies    penicillin (Other)    Intolerances    EXAMINATION:  General: ill-appearing trached male   HEENT: Anicteric sclerae  Cardiac: [id number]  Lungs: Clear  Abdomen: Soft, non-tender, +BS  Extremities: LUE (+) 4 pitting edema and swelling   Neurologic: trached, s/p Ativan and Versed, no EO to voice or noxious stimuli, not following commands, Pupil 3mm brisk bilaterally, dysconjugate gaze, no nystagmus, no gaze preference, contracted RUE, ?trace WD RUE/RLE, no movement to noxious stimuli LLE            ICU Vital Signs Last 24 Hrs  T(C): 36.5 (28 Jul 2023 20:00), Max: 36.6 (28 Jul 2023 04:00)  T(F): 97.7 (28 Jul 2023 20:00), Max: 97.9 (28 Jul 2023 04:00)  HR: 74 (28 Jul 2023 21:02) (67 - 85)  BP: 95/52 (28 Jul 2023 21:00) (79/46 - 113/66)  BP(mean): 68 (28 Jul 2023 21:00) (58 - 81)  ABP: --  ABP(mean): --  RR: 12 (28 Jul 2023 21:00) (11 - 26)  SpO2: 98% (28 Jul 2023 21:02) (98% - 100%)      07-27-23 @ 07:01  -  07-28-23 @ 07:00  --------------------------------------------------------  IN: 3594.6 mL / OUT: 0 mL / NET: 3594.6 mL    07-28-23 @ 07:01  -  07-28-23 @ 22:33  --------------------------------------------------------  IN: 1225.1 mL / OUT: 1000 mL / NET: 225.1 mL        Mode: AC/ CMV (Assist Control/ Continuous Mandatory Ventilation), RR (machine): 14, TV (machine): 450, FiO2: 40, PEEP: 8, ITime: 1, MAP: 12, PIP: 24    acetaminophen     Tablet .. 650 milliGRAM(s) Oral every 6 hours PRN  aluminum hydroxide/magnesium hydroxide/simethicone Suspension 30 milliLiter(s) Oral every 4 hours PRN  aMIOdarone    Tablet 200 milliGRAM(s) Oral daily  ascorbic acid 500 milliGRAM(s) Oral daily  atorvastatin 80 milliGRAM(s) Oral at bedtime  buMETAnide 1 milliGRAM(s) Oral daily  chlorhexidine 0.12% Liquid 15 milliLiter(s) Oral Mucosa every 12 hours  chlorhexidine 2% Cloths 1 Application(s) Topical <User Schedule>  collagenase Ointment 1 Application(s) Topical two times a day  doxazosin 4 milliGRAM(s) Oral at bedtime  epoetin guanakito-epbx (RETACRIT) Injectable 13372 Unit(s) SubCutaneous every 7 days  ergocalciferol Drops 4000 Unit(s) Oral every 24 hours  ferrous    sulfate Liquid 300 milliGRAM(s) Enteral Tube daily  folic acid 1 milliGRAM(s) Oral daily  glucagon  Injectable 1 milliGRAM(s) IV Push once  melatonin 3 milliGRAM(s) Oral at bedtime PRN  midodrine 5 milliGRAM(s) Oral every 8 hours  multivitamin 1 Tablet(s) Oral daily  norepinephrine Infusion 0.05 MICROgram(s)/kG/Min (8.28 mL/Hr) IV Continuous <Continuous>  pantoprazole  Injectable 40 milliGRAM(s) IV Push two times a day  polyethylene glycol 3350 17 Gram(s) Oral daily  propofol Infusion 40 MICROgram(s)/kG/Min (22.5 mL/Hr) IV Continuous <Continuous>  valproate sodium  IVPB 750 milliGRAM(s) IV Intermittent every 8 hours      LABS:  Na: 127 (07-28 @ 05:31), 131 (07-27 @ 05:33), 134 (07-26 @ 21:00)  K: 3.5 (07-28 @ 05:31), 3.3 (07-27 @ 05:33), 3.7 (07-26 @ 21:00)  Cl: 89 (07-28 @ 05:31), 92 (07-27 @ 05:33), 93 (07-26 @ 21:00)  CO2: 24 (07-28 @ 05:31), 28 (07-27 @ 05:33), 28 (07-26 @ 21:00)  BUN: 97 (07-28 @ 05:31), 105 (07-27 @ 05:33), 98 (07-26 @ 21:00)  Cr: 3.7 (07-28 @ 05:31), 3.6 (07-27 @ 05:33), 3.4 (07-26 @ 21:00)  Glu: 82(07-28 @ 05:31), 43(07-27 @ 05:33), 10(07-26 @ 21:00)    Hgb: 7.6 (07-28 @ 05:31), 7.1 (07-27 @ 05:33), 7.2 (07-26 @ 21:00)  Hct: 23.6 (07-28 @ 05:31), 22.3 (07-27 @ 05:33), 22.5 (07-26 @ 21:00)  WBC: 14.36 (07-28 @ 05:31), 11.37 (07-27 @ 05:33), 11.14 (07-26 @ 21:00)  Plt: 418 (07-28 @ 05:31), 395 (07-27 @ 05:33), 420 (07-26 @ 21:00)    INR: 0.99 07-26-23 @ 21:00  PTT: 33.1 07-26-23 @ 21:00          LIVER FUNCTIONS - ( 28 Jul 2023 05:31 )  Alb: 2.0 g/dL / Pro: 5.2 g/dL / ALK PHOS: 122 U/L / ALT: 11 U/L / AST: 16 U/L / GGT: x           ABG - ( 28 Jul 2023 02:55 )  pH, Arterial: 7.34  pH, Blood: x     /  pCO2: 50    /  pO2: 137   / HCO3: 27    / Base Excess: 0.5   /  SaO2: 99.5                     SUMMARY: HPI:  63-year-old male with a PMHx of HLD, afib, CAD, s/p PCI, on ASA, DM, HTN, (+) large posterior fossa IPH w/ IVH/SAH/hydrocephalus, s/p EVD and SOC, [11/4/2021 @ [facility]], DSA (+) PICA aneurysm, s/p SOC PICA aneurysm resection, [11/11/2021], s/p right parietal  shunt [11/23/2021], s/p trach/PEG [11/19/2021], Recurrent C diff, ESRD on HMD weekly through Tesio cath who was brought in from nursing home initially for anemia was found also to be hypoglycemic at 10 and had 2 episodes of tonic clonic seizures in the ED.      Blood work was done at nursing home and found out that hemoglobin was 6.4. Patient was recently started on Glimepiride at the NH.  No sign recent fevers, bleeding per PEG, thickening of the sputum or hematochezia.    In the ED the patient was hemodynamically unstable and in status epilepticus.  Ativan, keppra and propofol were given and patient was started on Valproic acid as per NeuroCrit team  MAP less than 65 patient was started on Levophed after central line insertion  Labs were significant for mild leukocytosis, severe hypoglycemia and Hemoglobin 7,2    Patient admitted for status epilepticus secondary to hypoglycemia and possible sepsis (27 Jul 2023 01:00)      NCC consulted for seizures ?status epilepticus in the setting of hypoglycemia. Patient s/p Versed 4mg x1, Keppra 1gram IV x1, Ativan 4mg IV x2, with Propofol gtt initiated. Admitted to MICU      REVIEW OF SYSTEMS: Patient unable to participate in ROS due to neurologic status.     VITALS: [X] Reviewed    IMAGING/DATA: [X] Reviewed    IVF FLUIDS/MEDICATIONS: [X] Reviewed    ALLERGIES: Allergies    penicillin (Other)    Intolerances    EXAMINATION:  General: ill-appearing trached male   HEENT: Anicteric sclerae  Cardiac: [id number]  Lungs: Clear  Abdomen: Soft, non-tender, +BS  Extremities: LUE (+) 4 pitting edema and swelling   Neurologic: trached, sedated on propofol, no EO to voice or noxious stimuli, not following commands, Pupil 3mm/reactive b/l, dysconjugate gaze, no nystagmus, no gaze preference, contracted RUE, ?trace WD RUE/RLE, trace WD LUE, no movement to noxious stimuli LLE            ICU Vital Signs Last 24 Hrs  T(C): 36.5 (28 Jul 2023 20:00), Max: 36.6 (28 Jul 2023 04:00)  T(F): 97.7 (28 Jul 2023 20:00), Max: 97.9 (28 Jul 2023 04:00)  HR: 74 (28 Jul 2023 21:02) (67 - 85)  BP: 95/52 (28 Jul 2023 21:00) (79/46 - 113/66)  BP(mean): 68 (28 Jul 2023 21:00) (58 - 81)  ABP: --  ABP(mean): --  RR: 12 (28 Jul 2023 21:00) (11 - 26)  SpO2: 98% (28 Jul 2023 21:02) (98% - 100%)      07-27-23 @ 07:01  -  07-28-23 @ 07:00  --------------------------------------------------------  IN: 3594.6 mL / OUT: 0 mL / NET: 3594.6 mL    07-28-23 @ 07:01  -  07-28-23 @ 22:33  --------------------------------------------------------  IN: 1225.1 mL / OUT: 1000 mL / NET: 225.1 mL        Mode: AC/ CMV (Assist Control/ Continuous Mandatory Ventilation), RR (machine): 14, TV (machine): 450, FiO2: 40, PEEP: 8, ITime: 1, MAP: 12, PIP: 24    acetaminophen     Tablet .. 650 milliGRAM(s) Oral every 6 hours PRN  aluminum hydroxide/magnesium hydroxide/simethicone Suspension 30 milliLiter(s) Oral every 4 hours PRN  aMIOdarone    Tablet 200 milliGRAM(s) Oral daily  ascorbic acid 500 milliGRAM(s) Oral daily  atorvastatin 80 milliGRAM(s) Oral at bedtime  buMETAnide 1 milliGRAM(s) Oral daily  chlorhexidine 0.12% Liquid 15 milliLiter(s) Oral Mucosa every 12 hours  chlorhexidine 2% Cloths 1 Application(s) Topical <User Schedule>  collagenase Ointment 1 Application(s) Topical two times a day  doxazosin 4 milliGRAM(s) Oral at bedtime  epoetin guanakito-epbx (RETACRIT) Injectable 17126 Unit(s) SubCutaneous every 7 days  ergocalciferol Drops 4000 Unit(s) Oral every 24 hours  ferrous    sulfate Liquid 300 milliGRAM(s) Enteral Tube daily  folic acid 1 milliGRAM(s) Oral daily  glucagon  Injectable 1 milliGRAM(s) IV Push once  melatonin 3 milliGRAM(s) Oral at bedtime PRN  midodrine 5 milliGRAM(s) Oral every 8 hours  multivitamin 1 Tablet(s) Oral daily  norepinephrine Infusion 0.05 MICROgram(s)/kG/Min (8.28 mL/Hr) IV Continuous <Continuous>  pantoprazole  Injectable 40 milliGRAM(s) IV Push two times a day  polyethylene glycol 3350 17 Gram(s) Oral daily  propofol Infusion 40 MICROgram(s)/kG/Min (22.5 mL/Hr) IV Continuous <Continuous>  valproate sodium  IVPB 750 milliGRAM(s) IV Intermittent every 8 hours      LABS:  Na: 127 (07-28 @ 05:31), 131 (07-27 @ 05:33), 134 (07-26 @ 21:00)  K: 3.5 (07-28 @ 05:31), 3.3 (07-27 @ 05:33), 3.7 (07-26 @ 21:00)  Cl: 89 (07-28 @ 05:31), 92 (07-27 @ 05:33), 93 (07-26 @ 21:00)  CO2: 24 (07-28 @ 05:31), 28 (07-27 @ 05:33), 28 (07-26 @ 21:00)  BUN: 97 (07-28 @ 05:31), 105 (07-27 @ 05:33), 98 (07-26 @ 21:00)  Cr: 3.7 (07-28 @ 05:31), 3.6 (07-27 @ 05:33), 3.4 (07-26 @ 21:00)  Glu: 82(07-28 @ 05:31), 43(07-27 @ 05:33), 10(07-26 @ 21:00)    Hgb: 7.6 (07-28 @ 05:31), 7.1 (07-27 @ 05:33), 7.2 (07-26 @ 21:00)  Hct: 23.6 (07-28 @ 05:31), 22.3 (07-27 @ 05:33), 22.5 (07-26 @ 21:00)  WBC: 14.36 (07-28 @ 05:31), 11.37 (07-27 @ 05:33), 11.14 (07-26 @ 21:00)  Plt: 418 (07-28 @ 05:31), 395 (07-27 @ 05:33), 420 (07-26 @ 21:00)    INR: 0.99 07-26-23 @ 21:00  PTT: 33.1 07-26-23 @ 21:00          LIVER FUNCTIONS - ( 28 Jul 2023 05:31 )  Alb: 2.0 g/dL / Pro: 5.2 g/dL / ALK PHOS: 122 U/L / ALT: 11 U/L / AST: 16 U/L / GGT: x           ABG - ( 28 Jul 2023 02:55 )  pH, Arterial: 7.34  pH, Blood: x     /  pCO2: 50    /  pO2: 137   / HCO3: 27    / Base Excess: 0.5   /  SaO2: 99.5

## 2023-07-28 NOTE — CHART NOTE - NSCHARTNOTEFT_GEN_A_CORE
Transfer Note: MICU to Medical floor       Accepting Physician:  - Dr Antonino hendrickson     Signout given to:   -     Follow up:  - fu ammonia, blood sugar, neuro notes, burn team notes, blood culture, neuro surgery notes,     HPI / HOSPITAL COURSE:    63-year-old male with a past medical history of hyperlipidemia, atrial fibrillation, diabetes, hypertension, large posterior fossa IPH w/ IVH/SAH/hydrocephalus, s/p suboccipital craniectomy for PICA aneurysm resection and  shunt in 2021, CAD s/p stents on ASA, Recurrent C diff, ESRD on Dialysis through Tesio cath and is trach/PEG who was brought in from nursing home initially for anemia was found also to be hypoglycemic at 10 and had 2 episodes of tonic clonic seizures in the ED.    Blood work was done at nursing home and found out that hemoglobin was 6.4. Patient was recently started on Glimeperide at the NH.  No sign recent fevers, bleeding per PEG, thickening of the sputum or hematochezia.  In the ED the patient was hemodynamically unstable and in status epilepticus.  Versed, Ativan, keppra and propofol were given in the ED and patient was started on Valproic acid as per NeuroCrit team  MAP less than 65 patient was started on Levophed after central line insertion  Labs were significant for mild leukocytosis, severe hypoglycemia and Hemoglobin 7,2  Patient admitted for status epilepticus secondary to hypoglycemia and possible sepsis      Vital Signs Last 24 Hrs  T(C): 36.5 (28 Jul 2023 08:00), Max: 36.6 (28 Jul 2023 04:00)  T(F): 97.7 (28 Jul 2023 08:00), Max: 97.9 (28 Jul 2023 04:00)  HR: 75 (28 Jul 2023 08:00) (67 - 78)  BP: 95/53 (28 Jul 2023 08:00) (82/52 - 110/63)  BP(mean): 69 (28 Jul 2023 08:00) (62 - 79)  RR: 19 (28 Jul 2023 08:00) (1 - 26)  SpO2: 100% (28 Jul 2023 08:00) (100% - 100%)    Parameters below as of 28 Jul 2023 08:00  Patient On (Oxygen Delivery Method): ventilator    O2 Concentration (%): 40    I&O's Summary    27 Jul 2023 07:01  -  28 Jul 2023 07:00  --------------------------------------------------------  IN: 3594.6 mL / OUT: 0 mL / NET: 3594.6 mL        Physical Exam:   GENERAL: in no acute distress, has PEG and trach tube   HEAD:  Atraumatic, Normocephalic  EYES: conjunctiva and sclera clear  ENMT: No tonsillar erythema, exudates, or enlargement; Moist mucous membranes,   NECK: Supple, No JVD, Normal thyroid  NERVOUS SYSTEM: Patient unable to follow commands. awake, alert.  CHEST/LUNG: Clear to percussion bilaterally; No rales, rhonchi, wheezing, or rubs  HEART: Regular rate and rhythm; No murmurs, rubs, or gallops  ABDOMEN: Soft, Nontender, Nondistended; Bowel sounds present, PEG tube noted.   EXTREMITIES:  2+ Peripheral Pulses,  edema noted   LYMPH: No lymphadenopathy noted  SKIN: pressure ulcers sacrum    LABS:   CARDIAC MARKERS ( 27 Jul 2023 05:33 )  x     / x     / 115 U/L / x     / x                                  7.6    14.36 )-----------( 418      ( 28 Jul 2023 05:31 )             23.6       07-28    127<L>  |  89<L>  |  97<HH>  ----------------------------<  82  3.5   |  24  |  3.7<H>    Ca    8.6      28 Jul 2023 05:31  Phos  3.2     07-28  Mg     2.6     07-28    TPro  5.2<L>  /  Alb  2.0<L>  /  TBili  0.2  /  DBili  x   /  AST  16  /  ALT  11  /  AlkPhos  122<H>  07-28      PT/INR - ( 26 Jul 2023 21:00 )   PT: 11.30 sec;   INR: 0.99 ratio         PTT - ( 26 Jul 2023 21:00 )  PTT:33.1 sec    ABG - ( 28 Jul 2023 02:55 )  pH, Arterial: 7.34  pH, Blood: x     /  pCO2: 50    /  pO2: 137   / HCO3: 27    / Base Excess: 0.5   /  SaO2: 99.5              ASSESSMENT & PLAN:     63-year-old male with a past medical history of hyperlipidemia, atrial fibrillation, diabetes, hypertension, large posterior fossa IPH w/ IVH/SAH/hydrocephalus, s/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  was found also to be hypoglycemic with blood sugar of 10 and had two episodes of tonic clonic seizures in the ED.   Patient non-verbal at baseline .      #Hypoglycemia possibly due to sulfonyl urea med  #GTC seizure x2.  #status epilepticus in the setting of hypoglycemia  #ESRD on glimiperide  - Blood sugar in ed: 10  - s/P multiple iv dextrose solutions   - Patient s/p Versed 4mg x1, Keppra 1gram IV x1, Ativan 4mg IV x2, with Propofol gtt initiated.  - CTH, F/U read: In comparison to the previous head CT dated 12/12/2021: Stable position of a right transparietal  shunt catheter. Interval decreased size of the lateral ventricles, now both slit-like. New predominantly low-density subdural collection over the right cerebral convexity measuring 7 mm in width, likely related to shunting. New wedge-shaped hypodensity within the left occipital lobe likely reflecting age indeterminate infarct. Redemonstrated suboccipital craniectomy. 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 Progressive bilateral cerebellar encephalomalacia. New distended appearance of the cerebral aqueduct and fourth   ventricle. New bilateral mastoid and middle ear opacification.  - video EEG: Focal and generalized slowing, Interictal activity, runs of bilateral FP ( higher amplitude from the right) rhythmic activity that appears not be physiological . ( ? of artifact). It appears as questonable low amplitude spike and aftergoing slow with modified morphology( ? possiblity of change due the the prior procedure)  - consider Ativan challenge as per NICU   - now on D10, propofol 50 and levophed. if no seizure dc propofol  - Hold ASA as per Neuro  - In the setting of ESRD, D/C Keppra to VPA load 40mg/kg then continue 500mg q12hrs  - Obtain serum AED levels   - F/U A1c, lipids, TSH, Ft4, pro-insulin and Serum Cortisol  - Follow up blood, urine and sputum cultures, procalcitonin, CRP  - Correct any underlying metabolic, infectious, or electrolyte derangements    #ESRD on Dialysis through Tesio cath   # Acute Renal Failure, started on HD 6/1, as per Renal team rec.   - daily BMP monitoring, daily weight, daily strict I/O chart  - s/p permanent HD cath. placement on 7/7/23  - fu nephro notes : Biweekly schedule . no acute indications to start CVVHD today. Get phosphorous, Vitamin D  and PTH levels. On ritacrit and venofer     # anemia of chronic disease - no gross bleeding events  - hb 7.2  - s/p 1 pack RBC  - transfuse prn for goal > 7  - On ritacrit and venofer   - Hold ASA as per Neuro    # Hx ICH   # s/p  shunt  - Bedbound from NH . chronic non-verbal , quadriplegia - continue daily care.   - c/w keppra 500mg BID for seizure prophylaxis  - fu neurosurgery   - Hold ASA as per Neuro    # chronic respiratory failure, vent dependant via trach  -  trach care,  Vent management per Pulm. team     # Chronic dysphagia  - sp peg, peg care     # Afib w/ RVR  -now rate controlled     #Acute Decompensated CHF  -continued on Amiodarone tx, rate controlled       # multiple sacral wounds and buttock   - further wound care as per Wound care specialist report, frequent body position change  - fu burn team recs    # h/o DM 2  - continue bsfs monitoring  with insulin sliding scale co.    DVT prophylaxis: scd  Activity:  Diet: DASH  Dispo:  Code: DNR

## 2023-07-28 NOTE — PROGRESS NOTE ADULT - SUBJECTIVE AND OBJECTIVE BOX
Over Night Events: events noted, remain critically ill, Neuro/ renal noted, afebrile, ct head noted    PHYSICAL EXAM    ICU Vital Signs Last 24 Hrs  T(C): 36.6 (28 Jul 2023 04:00), Max: 36.6 (28 Jul 2023 04:00)  T(F): 97.9 (28 Jul 2023 04:00), Max: 97.9 (28 Jul 2023 04:00)  HR: 72 (28 Jul 2023 06:00) (67 - 81)  BP: 92/53 (28 Jul 2023 06:00) (82/52 - 110/63)  BP(mean): 68 (28 Jul 2023 06:00) (62 - 79)  RR: 14 (28 Jul 2023 06:00) (1 - 26)  SpO2: 100% (28 Jul 2023 06:00) (100% - 100%)    O2 Parameters below as of 28 Jul 2023 04:00  Patient On (Oxygen Delivery Method): ventilator    O2 Concentration (%): 40        General: ill looking  HEENT: trach        Lungs: dec bs both base  Cardiovascular: HERI 2.6  Abdomen: Soft, Positive BS  Sacral ulcer  unresponsive      07-26-23 @ 07:01  -  07-27-23 @ 07:00  --------------------------------------------------------  IN:    dextrose 10%: 525 mL    IV PiggyBack: 50 mL    Norepinephrine: 79.5 mL    Propofol (Status Epilepticus): 185.5 mL  Total IN: 840 mL    OUT:    Rectal Tube (mL): 100 mL    Voided (mL): 100 mL  Total OUT: 200 mL    Total NET: 640 mL      07-27-23 @ 07:01  -  07-28-23 @ 06:40  --------------------------------------------------------  IN:    dextrose 10%: 1800 mL    Enteral Tube Flush: 80 mL    IV PiggyBack: 300 mL    Norepinephrine: 360 mL    Propofol (Status Epilepticus): 634.6 mL    Vital High Protein: 420 mL  Total IN: 3594.6 mL    OUT:  Total OUT: 0 mL    Total NET: 3594.6 mL          LABS:                          7.6    14.36 )-----------( 418      ( 28 Jul 2023 05:31 )             23.6                                               07-27    131<L>  |  92<L>  |  105<HH>  ----------------------------<  43<LL>  3.3<L>   |  28  |  3.6<H>    Ca    9.2      27 Jul 2023 05:33  Phos  3.0     07-27  Mg     2.7     07-27    TPro  5.7<L>  /  Alb  2.2<L>  /  TBili  0.3  /  DBili  x   /  AST  16  /  ALT  12  /  AlkPhos  125<H>  07-27      PT/INR - ( 26 Jul 2023 21:00 )   PT: 11.30 sec;   INR: 0.99 ratio         PTT - ( 26 Jul 2023 21:00 )  PTT:33.1 sec                                       Urinalysis Basic - ( 27 Jul 2023 05:33 )    Color: x / Appearance: x / SG: x / pH: x  Gluc: 43 mg/dL / Ketone: x  / Bili: x / Urobili: x   Blood: x / Protein: x / Nitrite: x   Leuk Esterase: x / RBC: x / WBC x   Sq Epi: x / Non Sq Epi: x / Bacteria: x        CARDIAC MARKERS ( 27 Jul 2023 05:33 )  x     / x     / 115 U/L / x     / x                                                LIVER FUNCTIONS - ( 27 Jul 2023 05:33 )  Alb: 2.2 g/dL / Pro: 5.7 g/dL / ALK PHOS: 125 U/L / ALT: 12 U/L / AST: 16 U/L / GGT: x                                                                                               Mode: AC/ CMV (Assist Control/ Continuous Mandatory Ventilation)  RR (machine): 14  TV (machine): 450  FiO2: 40  PEEP: 8  ITime: 1  MAP: 12  PIP: 25                                      ABG - ( 28 Jul 2023 02:55 )  pH, Arterial: 7.34  pH, Blood: x     /  pCO2: 50    /  pO2: 137   / HCO3: 27    / Base Excess: 0.5   /  SaO2: 99.5                MEDICATIONS  (STANDING):  albuterol/ipratropium for Nebulization 3 milliLiter(s) Nebulizer every 6 hours  aMIOdarone    Tablet 200 milliGRAM(s) Oral daily  ascorbic acid 500 milliGRAM(s) Oral daily  atorvastatin 80 milliGRAM(s) Oral at bedtime  buMETAnide 1 milliGRAM(s) Oral daily  chlorhexidine 0.12% Liquid 15 milliLiter(s) Oral Mucosa every 12 hours  chlorhexidine 2% Cloths 1 Application(s) Topical <User Schedule>  collagenase Ointment 1 Application(s) Topical two times a day  dextrose 10%. 1000 milliLiter(s) (75 mL/Hr) IV Continuous <Continuous>  doxazosin 4 milliGRAM(s) Oral at bedtime  epoetin guanakito-epbx (RETACRIT) Injectable 93468 Unit(s) SubCutaneous every 7 days  ergocalciferol Drops 4000 Unit(s) Oral every 24 hours  ferrous    sulfate Liquid 300 milliGRAM(s) Enteral Tube daily  folic acid 1 milliGRAM(s) Oral daily  glucagon  Injectable 1 milliGRAM(s) IV Push once  multivitamin 1 Tablet(s) Oral daily  norepinephrine Infusion 0.05 MICROgram(s)/kG/Min (8.28 mL/Hr) IV Continuous <Continuous>  pantoprazole  Injectable 40 milliGRAM(s) IV Push two times a day  polyethylene glycol 3350 17 Gram(s) Oral daily  propofol Infusion. 50 MICROgram(s)/kG/Min (26.5 mL/Hr) IV Continuous <Continuous>  valproate sodium  IVPB 500 milliGRAM(s) IV Intermittent every 8 hours    MEDICATIONS  (PRN):  acetaminophen     Tablet .. 650 milliGRAM(s) Oral every 6 hours PRN Temp greater or equal to 38C (100.4F), Mild Pain (1 - 3)  aluminum hydroxide/magnesium hydroxide/simethicone Suspension 30 milliLiter(s) Oral every 4 hours PRN Dyspepsia  melatonin 3 milliGRAM(s) Oral at bedtime PRN Insomnia      cxr reviewed   Over Night Events: events noted, remain critically ill, Neuro/ renal noted, afebrile, ct head noted, neuro noted, d10 75 cc/h. propofol    PHYSICAL EXAM    ICU Vital Signs Last 24 Hrs  T(C): 36.6 (28 Jul 2023 04:00), Max: 36.6 (28 Jul 2023 04:00)  T(F): 97.9 (28 Jul 2023 04:00), Max: 97.9 (28 Jul 2023 04:00)  HR: 72 (28 Jul 2023 06:00) (67 - 81)  BP: 92/53 (28 Jul 2023 06:00) (82/52 - 110/63)  BP(mean): 68 (28 Jul 2023 06:00) (62 - 79)  RR: 14 (28 Jul 2023 06:00) (1 - 26)  SpO2: 100% (28 Jul 2023 06:00) (100% - 100%)    O2 Parameters below as of 28 Jul 2023 04:00  Patient On (Oxygen Delivery Method): ventilator    O2 Concentration (%): 40        General: ill looking  HEENT: trach        Lungs: dec bs both base  Cardiovascular: HERI 2.6  Abdomen: Soft, Positive BS  Sacral ulcer  unresponsive      07-26-23 @ 07:01  -  07-27-23 @ 07:00  --------------------------------------------------------  IN:    dextrose 10%: 525 mL    IV PiggyBack: 50 mL    Norepinephrine: 79.5 mL    Propofol (Status Epilepticus): 185.5 mL  Total IN: 840 mL    OUT:    Rectal Tube (mL): 100 mL    Voided (mL): 100 mL  Total OUT: 200 mL    Total NET: 640 mL      07-27-23 @ 07:01  -  07-28-23 @ 06:40  --------------------------------------------------------  IN:    dextrose 10%: 1800 mL    Enteral Tube Flush: 80 mL    IV PiggyBack: 300 mL    Norepinephrine: 360 mL    Propofol (Status Epilepticus): 634.6 mL    Vital High Protein: 420 mL  Total IN: 3594.6 mL    OUT:  Total OUT: 0 mL    Total NET: 3594.6 mL          LABS:                          7.6    14.36 )-----------( 418      ( 28 Jul 2023 05:31 )             23.6                                               07-27    131<L>  |  92<L>  |  105<HH>  ----------------------------<  43<LL>  3.3<L>   |  28  |  3.6<H>    Ca    9.2      27 Jul 2023 05:33  Phos  3.0     07-27  Mg     2.7     07-27    TPro  5.7<L>  /  Alb  2.2<L>  /  TBili  0.3  /  DBili  x   /  AST  16  /  ALT  12  /  AlkPhos  125<H>  07-27      PT/INR - ( 26 Jul 2023 21:00 )   PT: 11.30 sec;   INR: 0.99 ratio         PTT - ( 26 Jul 2023 21:00 )  PTT:33.1 sec                                       Urinalysis Basic - ( 27 Jul 2023 05:33 )    Color: x / Appearance: x / SG: x / pH: x  Gluc: 43 mg/dL / Ketone: x  / Bili: x / Urobili: x   Blood: x / Protein: x / Nitrite: x   Leuk Esterase: x / RBC: x / WBC x   Sq Epi: x / Non Sq Epi: x / Bacteria: x        CARDIAC MARKERS ( 27 Jul 2023 05:33 )  x     / x     / 115 U/L / x     / x                                                LIVER FUNCTIONS - ( 27 Jul 2023 05:33 )  Alb: 2.2 g/dL / Pro: 5.7 g/dL / ALK PHOS: 125 U/L / ALT: 12 U/L / AST: 16 U/L / GGT: x                                                                                               Mode: AC/ CMV (Assist Control/ Continuous Mandatory Ventilation)  RR (machine): 14  TV (machine): 450  FiO2: 40  PEEP: 8  ITime: 1  MAP: 12  PIP: 25                                      ABG - ( 28 Jul 2023 02:55 )  pH, Arterial: 7.34  pH, Blood: x     /  pCO2: 50    /  pO2: 137   / HCO3: 27    / Base Excess: 0.5   /  SaO2: 99.5                MEDICATIONS  (STANDING):  albuterol/ipratropium for Nebulization 3 milliLiter(s) Nebulizer every 6 hours  aMIOdarone    Tablet 200 milliGRAM(s) Oral daily  ascorbic acid 500 milliGRAM(s) Oral daily  atorvastatin 80 milliGRAM(s) Oral at bedtime  buMETAnide 1 milliGRAM(s) Oral daily  chlorhexidine 0.12% Liquid 15 milliLiter(s) Oral Mucosa every 12 hours  chlorhexidine 2% Cloths 1 Application(s) Topical <User Schedule>  collagenase Ointment 1 Application(s) Topical two times a day  dextrose 10%. 1000 milliLiter(s) (75 mL/Hr) IV Continuous <Continuous>  doxazosin 4 milliGRAM(s) Oral at bedtime  epoetin guanakito-epbx (RETACRIT) Injectable 14527 Unit(s) SubCutaneous every 7 days  ergocalciferol Drops 4000 Unit(s) Oral every 24 hours  ferrous    sulfate Liquid 300 milliGRAM(s) Enteral Tube daily  folic acid 1 milliGRAM(s) Oral daily  glucagon  Injectable 1 milliGRAM(s) IV Push once  multivitamin 1 Tablet(s) Oral daily  norepinephrine Infusion 0.05 MICROgram(s)/kG/Min (8.28 mL/Hr) IV Continuous <Continuous>  pantoprazole  Injectable 40 milliGRAM(s) IV Push two times a day  polyethylene glycol 3350 17 Gram(s) Oral daily  propofol Infusion. 50 MICROgram(s)/kG/Min (26.5 mL/Hr) IV Continuous <Continuous>  valproate sodium  IVPB 500 milliGRAM(s) IV Intermittent every 8 hours    MEDICATIONS  (PRN):  acetaminophen     Tablet .. 650 milliGRAM(s) Oral every 6 hours PRN Temp greater or equal to 38C (100.4F), Mild Pain (1 - 3)  aluminum hydroxide/magnesium hydroxide/simethicone Suspension 30 milliLiter(s) Oral every 4 hours PRN Dyspepsia  melatonin 3 milliGRAM(s) Oral at bedtime PRN Insomnia      cxr reviewed

## 2023-07-28 NOTE — PROGRESS NOTE ADULT - ASSESSMENT
ASSESSMENT: 63-year-old male p/w anemia [Hgb 6.4], extreme hypoglycemia [serum glucose 10], and ?sepsis s/p GTC seizure x2. NCC consulted for seizures ?status epilepticus in the setting of hypoglycemia. Patient s/p Versed 4mg x1, Keppra 1gram IV x1, Ativan 4mg IV x2, with Propofol gtt initiated. Admitted to MICU      RECOMMENDATIONS:  c/w vEEG, mild improvement noted today; CTH stable  neurosurgical f/u regarding evaluation for possible adjustment of VPS dial  increase VPA to 750mg IVPB q 8; corrected level with albumin on 7/28 is ~48  would start tapering propofol gtt as patient tolerates if able  metabolic etiology of encephalopathy (MICHELLE, uremia, glycemic control) as per MICU

## 2023-07-28 NOTE — PROGRESS NOTE ADULT - ASSESSMENT
IMPRESSION:    Seizure/ hypoglycemia ( on glimiperide/ HD)  Chronic hypoxemic resp failure/ trach  anemia sp tx/ no active bleed  ESRD on HD  NSTEMI likely type 2  Paroxysmal Afib  H/o ICH s/p trach and PEG  H/o CAD      PLAN:    CNS: EEG noted, AED, per neuro, Neuro sx eval    HEENT: Oral and trach care    PULMONARY: HOB 45. Aspiration.  No vent changes.  Goal saturation 92-96%. Vent dependent.  not weanable    CARDIOVASCULAR: Avoid overload.      GI:  Feeding  Bowel regimen     RENAL: trend CMP.  Correct as needed.  Nephrology following. HD per renal     INFECTIOUS DISEASE: ABX per ID.      HEMATOLOGICAL: DVT prophylaxis. fu cbc noted    ENDOCRINE: Follow up FS. Insulin protocol if needed. FS  q 1h    DNR    Poor prognosis   IMPRESSION:    Seizure/ hypoglycemia ( on glimiperide/ HD)  Chronic hypoxemic resp failure/ trach  anemia sp tx/ no active bleed  ESRD on HD  NSTEMI likely type 2  Paroxysmal Afib  H/o ICH s/p trach and PEG  H/o CAD      PLAN:    CNS: EEG noted, AED, per neuro, Neuro sx eval    HEENT: Oral and trach care    PULMONARY: HOB 45. Aspiration.  No vent changes.  Goal saturation 92-96%. Vent dependent.  not weanable    CARDIOVASCULAR: Avoid overload.      GI:  Feeding  Bowel regimen     RENAL: trend CMP.  Correct as needed.  Nephrology following. HD per renal     INFECTIOUS DISEASE: ABX per ID.      HEMATOLOGICAL: DVT prophylaxis. fu cbc noted    ENDOCRINE: Follow up FS. Insulin protocol if needed. d cd 10    DNR    Poor prognosis  vent unit

## 2023-07-29 NOTE — PROGRESS NOTE ADULT - ASSESSMENT
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  was found also to be hypoglycemic with blood sugar of 10 and had two episodes of tonic clonic seizures in the ED.   Patient non-verbal at baseline . Nephrology was consulted for ESRD.     ESRD on HD  - Biweekly schedule sp HD yesterday   - On levophed, BP on the low side  - anticipate next ttt on tuesday  - Fluid overload, on ventilator low Fio2  - electrolytes reviewed / hyponatremia better     Anemia of ESRD   - Hbg 7.1. On RETACRIT and venofer / transfuse to keep Hb > 7    MBD  IP at at [facility]     will follow

## 2023-07-29 NOTE — PROGRESS NOTE ADULT - SUBJECTIVE AND OBJECTIVE BOX
24H events:    Patient is a 63y old Male who presents with a chief complaint of Seizures and Anemia (28 Jul 2023 08:40)    Primary diagnosis of Hypoglycemia       Today is hospital day 3d.      Patient seen and examined at bedside. Reports no active complaints.     PAST MEDICAL & SURGICAL HISTORY  HTN (hypertension)    Diabetes mellitus    H/O intracranial hemorrhage    H/O tracheostomy    PEG (percutaneous endoscopic gastrostomy) status    Hyperlipidemia      SOCIAL HISTORY:  Negative for smoking/alcohol/drug use.     ALLERGIES:  penicillin (Other)    MEDICATIONS:  MEDICATIONS  (STANDING):  aMIOdarone    Tablet 200 milliGRAM(s) Oral daily  ascorbic acid 500 milliGRAM(s) Oral daily  atorvastatin 80 milliGRAM(s) Oral at bedtime  chlorhexidine 0.12% Liquid 15 milliLiter(s) Oral Mucosa every 12 hours  chlorhexidine 2% Cloths 1 Application(s) Topical <User Schedule>  collagenase Ointment 1 Application(s) Topical once  collagenase Ointment 1 Application(s) Topical two times a day  Dakins Solution - 1/4 Strength 1 Application(s) Topical daily  doxazosin 4 milliGRAM(s) Oral at bedtime  epoetin guanakito-epbx (RETACRIT) Injectable 12214 Unit(s) SubCutaneous every 7 days  ergocalciferol Drops 4000 Unit(s) Oral every 24 hours  ferrous    sulfate Liquid 300 milliGRAM(s) Enteral Tube daily  folic acid 1 milliGRAM(s) Oral daily  glucagon  Injectable 1 milliGRAM(s) IV Push once  lacosamide IVPB 100 milliGRAM(s) IV Intermittent every 12 hours  midodrine 10 milliGRAM(s) Oral every 8 hours  multivitamin 1 Tablet(s) Oral daily  norepinephrine Infusion 0.05 MICROgram(s)/kG/Min (8.28 mL/Hr) IV Continuous <Continuous>  pantoprazole  Injectable 40 milliGRAM(s) IV Push two times a day  polyethylene glycol 3350 17 Gram(s) Oral daily  silver sulfADIAZINE 1% Cream 1 Application(s) Topical two times a day  valproate sodium  IVPB 750 milliGRAM(s) IV Intermittent every 8 hours    MEDICATIONS  (PRN):  acetaminophen     Tablet .. 650 milliGRAM(s) Oral every 6 hours PRN Temp greater or equal to 38C (100.4F), Mild Pain (1 - 3)  aluminum hydroxide/magnesium hydroxide/simethicone Suspension 30 milliLiter(s) Oral every 4 hours PRN Dyspepsia  melatonin 3 milliGRAM(s) Oral at bedtime PRN Insomnia    VITALS:   ICU Vital Signs Last 24 Hrs  T(C): 36 (29 Jul 2023 20:00), Max: 36.7 (29 Jul 2023 00:00)  T(F): 96.8 (29 Jul 2023 20:00), Max: 98.1 (29 Jul 2023 00:00)  HR: 77 (29 Jul 2023 20:00) (71 - 84)  BP: 100/58 (29 Jul 2023 20:00) (79/46 - 137/70)  BP(mean): 76 (29 Jul 2023 20:00) (58 - 96)  ABP: --  ABP(mean): --  RR: 15 (29 Jul 2023 20:00) (7 - 30)  SpO2: 100% (29 Jul 2023 20:00) (92% - 100%)    O2 Parameters below as of 29 Jul 2023 20:00  Patient On (Oxygen Delivery Method): ventilator    O2 Concentration (%): 40        LABS:                          7.6    16.09 )-----------( 394      ( 29 Jul 2023 04:30 )             23.5     07-29    134<L>  |  96<L>  |  66<HH>  ----------------------------<  74  3.2<L>   |  26  |  2.6<H>    Ca    8.5      29 Jul 2023 04:30  Phos  2.6     07-29  Mg     2.4     07-29    TPro  5.0<L>  /  Alb  2.0<L>  /  TBili  0.2  /  DBili  x   /  AST  17  /  ALT  10  /  AlkPhos  138<H>  07-29    LIVER FUNCTIONS - ( 29 Jul 2023 04:30 )  Alb: 2.0 g/dL / Pro: 5.0 g/dL / ALK PHOS: 138 U/L / ALT: 10 U/L / AST: 17 U/L / GGT: x             Urinalysis Basic - ( 29 Jul 2023 04:30 )    Color: x / Appearance: x / SG: x / pH: x  Gluc: 74 mg/dL / Ketone: x  / Bili: x / Urobili: x   Blood: x / Protein: x / Nitrite: x   Leuk Esterase: x / RBC: x / WBC x   Sq Epi: x / Non Sq Epi: x / Bacteria: x          RADIOLOGY:    PHYSICAL EXAM:  GENERAL: in no acute distress, has PEG and trach tube   HEAD:  Atraumatic, Normocephalic  EYES: conjunctiva and sclera clear  ENMT: No tonsillar erythema, exudates, or enlargement; Moist mucous membranes,   NECK: Supple, No JVD, Normal thyroid  NERVOUS SYSTEM: Patient unable to follow commands  CHEST/LUNG: Clear to percussion bilaterally; No rales, rhonchi, wheezing, or rubs  HEART: Regular rate and rhythm; No murmurs, rubs, or gallops  ABDOMEN: Soft, Nontender, Nondistended; Bowel sounds present, PEG tube noted.   EXTREMITIES:  2+ Peripheral Pulses,  edema noted   LYMPH: No lymphadenopathy noted  SKIN: pressure ulcers sacrum

## 2023-07-29 NOTE — PROGRESS NOTE ADULT - SUBJECTIVE AND OBJECTIVE BOX
Patient is a 63y old  Male who presents with a chief complaint of Seizures and Anemia (29 Jul 2023 08:34)        Over Night Events:  Remains critically ill on MV.  ON VEEG.  on Levophed.          ROS:     All ROS are negative except HPI         PHYSICAL EXAM    ICU Vital Signs Last 24 Hrs  T(C): 36.5 (29 Jul 2023 04:00), Max: 36.7 (29 Jul 2023 00:00)  T(F): 97.7 (29 Jul 2023 04:00), Max: 98.1 (29 Jul 2023 00:00)  HR: 80 (29 Jul 2023 06:00) (71 - 85)  BP: 85/53 (29 Jul 2023 06:00) (79/46 - 113/66)  BP(mean): 65 (29 Jul 2023 06:00) (58 - 81)  ABP: --  ABP(mean): --  RR: 7 (29 Jul 2023 06:00) (7 - 26)  SpO2: 100% (29 Jul 2023 06:00) (92% - 100%)    O2 Parameters below as of 29 Jul 2023 04:00  Patient On (Oxygen Delivery Method): ventilator    O2 Concentration (%): 40        CONSTITUTIONAL:  Ill appearing in NAD    ENT:   Airway patent,   Mouth with normal mucosa.   Trach     EYES:   Pupils equal,   Round and reactive to light.    CARDIAC:   Normal rate,   Regular rhythm.        RESPIRATORY:   No wheezing  Bilateral BS  Normal chest expansion  Not tachypneic,  No use of accessory muscles    GASTROINTESTINAL:  Abdomen soft,   Non-tender,   No guarding,   + BS    MUSCULOSKELETAL:   Contracted   No clubbing, cyanosis    NEUROLOGICAL:   Sedated  Does not follow commands at baseline     SKIN:   Skin normal color for race,   No evidence of rash.        07-28-23 @ 07:01  -  07-29-23 @ 07:00  --------------------------------------------------------  IN:    Enteral Tube Flush: 40 mL    IV PiggyBack: 100 mL    Norepinephrine: 525.7 mL    Propofol: 128.8 mL    Propofol: 95.4 mL    Propofol (Status Epilepticus): 209.6 mL    Vital High Protein: 1000 mL  Total IN: 2099.5 mL    OUT:    Other (mL): 1000 mL    Rectal Tube (mL): 20 mL  Total OUT: 1020 mL    Total NET: 1079.5 mL          LABS:                            7.6    16.09 )-----------( 394      ( 29 Jul 2023 04:30 )             23.5                                               07-29    134<L>  |  96<L>  |  66<HH>  ----------------------------<  74  3.2<L>   |  26  |  2.6<H>    Ca    8.5      29 Jul 2023 04:30  Phos  2.6     07-29  Mg     2.4     07-29    TPro  5.0<L>  /  Alb  2.0<L>  /  TBili  0.2  /  DBili  x   /  AST  17  /  ALT  10  /  AlkPhos  138<H>  07-29                                             Urinalysis Basic - ( 29 Jul 2023 04:30 )    Color: x / Appearance: x / SG: x / pH: x  Gluc: 74 mg/dL / Ketone: x  / Bili: x / Urobili: x   Blood: x / Protein: x / Nitrite: x   Leuk Esterase: x / RBC: x / WBC x   Sq Epi: x / Non Sq Epi: x / Bacteria: x                                                  LIVER FUNCTIONS - ( 29 Jul 2023 04:30 )  Alb: 2.0 g/dL / Pro: 5.0 g/dL / ALK PHOS: 138 U/L / ALT: 10 U/L / AST: 17 U/L / GGT: x                                                  Culture - Blood (collected 27 Jul 2023 05:19)  Source: .Blood None  Preliminary Report (28 Jul 2023 18:02):    No growth at 24 hours    Culture - Blood (collected 27 Jul 2023 00:38)  Source: .Blood Blood  Preliminary Report (28 Jul 2023 09:02):    No growth at 24 hours                                                   Mode: AC/ CMV (Assist Control/ Continuous Mandatory Ventilation)  RR (machine): 14  TV (machine): 450  FiO2: 40  PEEP: 8  ITime: 1  MAP: 12  PIP: 22                                      ABG - ( 29 Jul 2023 03:55 )  pH, Arterial: 7.39  pH, Blood: x     /  pCO2: 45    /  pO2: 107   / HCO3: 27    / Base Excess: 1.7   /  SaO2: 99.1   Lac 0.5               MEDICATIONS  (STANDING):  aMIOdarone    Tablet 200 milliGRAM(s) Oral daily  ascorbic acid 500 milliGRAM(s) Oral daily  atorvastatin 80 milliGRAM(s) Oral at bedtime  buMETAnide 1 milliGRAM(s) Oral daily  chlorhexidine 0.12% Liquid 15 milliLiter(s) Oral Mucosa every 12 hours  chlorhexidine 2% Cloths 1 Application(s) Topical <User Schedule>  collagenase Ointment 1 Application(s) Topical two times a day  doxazosin 4 milliGRAM(s) Oral at bedtime  epoetin guanakito-epbx (RETACRIT) Injectable 97005 Unit(s) SubCutaneous every 7 days  ergocalciferol Drops 4000 Unit(s) Oral every 24 hours  ferrous    sulfate Liquid 300 milliGRAM(s) Enteral Tube daily  folic acid 1 milliGRAM(s) Oral daily  glucagon  Injectable 1 milliGRAM(s) IV Push once  midodrine 5 milliGRAM(s) Oral every 8 hours  multivitamin 1 Tablet(s) Oral daily  norepinephrine Infusion 0.05 MICROgram(s)/kG/Min (8.28 mL/Hr) IV Continuous <Continuous>  pantoprazole  Injectable 40 milliGRAM(s) IV Push two times a day  polyethylene glycol 3350 17 Gram(s) Oral daily  propofol Infusion 40 MICROgram(s)/kG/Min (22.5 mL/Hr) IV Continuous <Continuous>  valproate sodium  IVPB 750 milliGRAM(s) IV Intermittent every 8 hours    MEDICATIONS  (PRN):  acetaminophen     Tablet .. 650 milliGRAM(s) Oral every 6 hours PRN Temp greater or equal to 38C (100.4F), Mild Pain (1 - 3)  aluminum hydroxide/magnesium hydroxide/simethicone Suspension 30 milliLiter(s) Oral every 4 hours PRN Dyspepsia  melatonin 3 milliGRAM(s) Oral at bedtime PRN Insomnia

## 2023-07-29 NOTE — PROGRESS NOTE ADULT - CRITICAL CARE ATTENDING COMMENT
multifactorial encephalopathy  seizure  VPS, subdural collection      recs as above
multifactorial encephalopathy  seizure  VPS, subdural collection      recs as above

## 2023-07-29 NOTE — CONSULT NOTE ADULT - ASSESSMENT
ASSESSMENT:  Stage 4 pressure ulcer to sacrum and left ischium and stage 3 ulcer to right ischium, no sign of infection    RECOMMENDATION:  Wound care - Santyl/ Moist Kerlex packing with 1/4 Dakins / DPD BID to sacrum and left ischium  Silvadene/DPD BID to Rt Ischium  No Surgical debridement   IV abx as indicated/ per ID  Offloading/ positional changes

## 2023-07-29 NOTE — PROGRESS NOTE ADULT - ASSESSMENT
ASSESSMENT: 63-year-old male p/w anemia [Hgb 6.4], extreme hypoglycemia [serum glucose 10], and ?sepsis s/p GTC seizure x2. NCC consulted for seizures ?status epilepticus in the setting of hypoglycemia. Patient s/p Versed 4mg x1, Keppra 1gram IV x1, Ativan 4mg IV x2, with Propofol gtt initiated. Admitted to MICU      RECOMMENDATIONS:  Intermittent myoclonus consistent with LPW, however, no electrographic seizure appreciated on vEEG,  c/w monitor off propofol  c/w VPA and Vimpat  Obtain VPA level in AM    c/w vEEG, mild improvement noted today; CTH stable  neurosurgical f/u regarding evaluation for possible adjustment of VPS dial  increase VPA to 750mg IVPB q 8; corrected level with albumin on 7/28 is ~48  would start tapering propofol gtt as patient tolerates if able  metabolic etiology of encephalopathy (MICHELLE, uremia, glycemic control) as per MICU     ASSESSMENT: 63-year-old male p/w anemia [Hgb 6.4], extreme hypoglycemia [serum glucose 10], and ?sepsis s/p GTC seizure x2. NCC consulted for seizures ?status epilepticus in the setting of hypoglycemia. Patient s/p Versed 4mg x1, Keppra 1gram IV x1, Ativan 4mg IV x2, with Propofol gtt initiated. Admitted to MICU      RECOMMENDATIONS:  Intermittent myoclonus consistent with lateralized periodic discharge, however, no electrographic seizure appreciated on vEEG,  continue monitoring off propofol  c/w VPA 750mg IVPB and Vimpat 100mg Q12  Obtain VPA level in AM

## 2023-07-29 NOTE — PROGRESS NOTE ADULT - SUBJECTIVE AND OBJECTIVE BOX
Nephrology progress note    THIS IS AN INCOMPLETE NOTE . FULL NOTE TO FOLLOW SHORTLY    Patient is seen and examined, events over the last 24 h noted .    Allergies:  penicillin (Other)    Hospital Medications:   MEDICATIONS  (STANDING):  aMIOdarone    Tablet 200 milliGRAM(s) Oral daily  ascorbic acid 500 milliGRAM(s) Oral daily  atorvastatin 80 milliGRAM(s) Oral at bedtime  buMETAnide 1 milliGRAM(s) Oral daily  chlorhexidine 0.12% Liquid 15 milliLiter(s) Oral Mucosa every 12 hours  chlorhexidine 2% Cloths 1 Application(s) Topical <User Schedule>  collagenase Ointment 1 Application(s) Topical two times a day  doxazosin 4 milliGRAM(s) Oral at bedtime  epoetin guanakito-epbx (RETACRIT) Injectable 57894 Unit(s) SubCutaneous every 7 days  ergocalciferol Drops 4000 Unit(s) Oral every 24 hours  ferrous    sulfate Liquid 300 milliGRAM(s) Enteral Tube daily  folic acid 1 milliGRAM(s) Oral daily  glucagon  Injectable 1 milliGRAM(s) IV Push once  midodrine 5 milliGRAM(s) Oral every 8 hours  multivitamin 1 Tablet(s) Oral daily  norepinephrine Infusion 0.05 MICROgram(s)/kG/Min (8.28 mL/Hr) IV Continuous <Continuous>  pantoprazole  Injectable 40 milliGRAM(s) IV Push two times a day  polyethylene glycol 3350 17 Gram(s) Oral daily  propofol Infusion 40 MICROgram(s)/kG/Min (22.5 mL/Hr) IV Continuous <Continuous>  valproate sodium  IVPB 750 milliGRAM(s) IV Intermittent every 8 hours        VITALS:  T(F): 97.7 (07-29-23 @ 04:00), Max: 98.1 (07-29-23 @ 00:00)  HR: 80 (07-29-23 @ 06:00)  BP: 85/53 (07-29-23 @ 06:00)  RR: 7 (07-29-23 @ 06:00)  SpO2: 100% (07-29-23 @ 06:00)  Wt(kg): --    07-27 @ 07:01  -  07-28 @ 07:00  --------------------------------------------------------  IN: 3594.6 mL / OUT: 0 mL / NET: 3594.6 mL    07-28 @ 07:01  -  07-29 @ 07:00  --------------------------------------------------------  IN: 2099.5 mL / OUT: 1020 mL / NET: 1079.5 mL          PHYSICAL EXAM:  Constitutional: NAD  HEENT: anicteric sclera, oropharynx clear, MMM  Neck: No JVD  Respiratory: CTAB, no wheezes, rales or rhonchi  Cardiovascular: S1, S2, RRR  Gastrointestinal: BS+, soft, NT/ND  Extremities: No cyanosis or clubbing. No peripheral edema  :  No teixeira.   Skin: No rashes    LABS:  07-29    134<L>  |  96<L>  |  66<HH>  ----------------------------<  74  3.2<L>   |  26  |  2.6<H>    Ca    8.5      29 Jul 2023 04:30  Phos  2.6     07-29  Mg     2.4     07-29    TPro  5.0<L>  /  Alb  2.0<L>  /  TBili  0.2  /  DBili      /  AST  17  /  ALT  10  /  AlkPhos  138<H>  07-29                          7.6    16.09 )-----------( 394      ( 29 Jul 2023 04:30 )             23.5       Urine Studies:  Urinalysis Basic - ( 29 Jul 2023 04:30 )    Color:  / Appearance:  / SG:  / pH:   Gluc: 74 mg/dL / Ketone:   / Bili:  / Urobili:    Blood:  / Protein:  / Nitrite:    Leuk Esterase:  / RBC:  / WBC    Sq Epi:  / Non Sq Epi:  / Bacteria:           Iron 49, TIBC 138, %sat 36      [07-28-23 @ 05:31]  Ferritin 1199      [07-28-23 @ 05:31]  PTH -- (Ca 8.9)      [07-28-23 @ 05:31]   20  Vitamin D (25OH) 41      [07-28-23 @ 05:31]  TSH 5.37      [07-27-23 @ 05:33]  Lipid: chol 72, TG 37, HDL 42, LDL --      [07-27-23 @ 05:33]    HBsAb <3.0      [06-01-23 @ 22:50]  HBsAb Nonreact      [07-14-23 @ 10:53]  HBsAg Nonreact      [07-11-23 @ 16:58]  HBcAb Nonreact      [06-01-23 @ 22:50]  HCV 0.16, Nonreact      [07-11-23 @ 16:58]  HIV Nonreact      [06-08-23 @ 16:00]  HIV Nonreact      [06-08-23 @ 12:20]        RADIOLOGY & ADDITIONAL STUDIES:   Nephrology progress note    Patient is seen and examined, events over the last 24 h noted .  Lying in bed   sp HD yesterday     Allergies:  penicillin (Other)    Hospital Medications:   MEDICATIONS  (STANDING):  aMIOdarone    Tablet 200 milliGRAM(s) Oral daily  ascorbic acid 500 milliGRAM(s) Oral daily  atorvastatin 80 milliGRAM(s) Oral at bedtime  buMETAnide 1 milliGRAM(s) Oral daily  doxazosin 4 milliGRAM(s) Oral at bedtime  epoetin guanakito-epbx (RETACRIT) Injectable 92513 Unit(s) SubCutaneous every 7 days  ergocalciferol Drops 4000 Unit(s) Oral every 24 hours  ferrous    sulfate Liquid 300 milliGRAM(s) Enteral Tube daily  folic acid 1 milliGRAM(s) Oral daily  glucagon  Injectable 1 milliGRAM(s) IV Push once  midodrine 5 milliGRAM(s) Oral every 8 hours  multivitamin 1 Tablet(s) Oral daily  norepinephrine Infusion 0.05 MICROgram(s)/kG/Min (8.28 mL/Hr) IV Continuous <Continuous>  pantoprazole  Injectable 40 milliGRAM(s) IV Push two times a day  polyethylene glycol 3350 17 Gram(s) Oral daily  propofol Infusion 40 MICROgram(s)/kG/Min (22.5 mL/Hr) IV Continuous <Continuous>  valproate sodium  IVPB 750 milliGRAM(s) IV Intermittent every 8 hours        VITALS:  T(F): 97.7 (07-29-23 @ 04:00), Max: 98.1 (07-29-23 @ 00:00)  HR: 80 (07-29-23 @ 06:00)  BP: 85/53 (07-29-23 @ 06:00)  RR: 7 (07-29-23 @ 06:00)  SpO2: 100% (07-29-23 @ 06:00)      07-27 @ 07:01  -  07-28 @ 07:00  --------------------------------------------------------  IN: 3594.6 mL / OUT: 0 mL / NET: 3594.6 mL    07-28 @ 07:01  -  07-29 @ 07:00  --------------------------------------------------------  IN: 2099.5 mL / OUT: 1020 mL / NET: 1079.5 mL          PHYSICAL EXAM:  Constitutional: NAD  HEENT: trached on MB  Respiratory: CTAB, no wheezes, rales or rhonchi  Cardiovascular: S1, S2, RRR  Gastrointestinal: BS+, soft, NT/ND  Extremities: No cyanosis or clubbing. No peripheral edema  :  No teixeira.   Skin: No rashes    LABS:  07-29    134<L>  |  96<L>  |  66<HH>  ----------------------------<  74  3.2<L>   |  26  |  2.6<H>    Ca    8.5      29 Jul 2023 04:30  Phos  2.6     07-29  Mg     2.4     07-29    TPro  5.0<L>  /  Alb  2.0<L>  /  TBili  0.2  /  DBili      /  AST  17  /  ALT  10  /  AlkPhos  138<H>  07-29                          7.6    16.09 )-----------( 394      ( 29 Jul 2023 04:30 )             23.5       Urine Studies:  Urinalysis Basic - ( 29 Jul 2023 04:30 )    Color:  / Appearance:  / SG:  / pH:   Gluc: 74 mg/dL / Ketone:   / Bili:  / Urobili:    Blood:  / Protein:  / Nitrite:    Leuk Esterase:  / RBC:  / WBC    Sq Epi:  / Non Sq Epi:  / Bacteria:           Iron 49, TIBC 138, %sat 36      [07-28-23 @ 05:31]  Ferritin 1199      [07-28-23 @ 05:31]  PTH -- (Ca 8.9)      [07-28-23 @ 05:31]   20  Vitamin D (25OH) 41      [07-28-23 @ 05:31]  TSH 5.37      [07-27-23 @ 05:33]  Lipid: chol 72, TG 37, HDL 42, LDL --      [07-27-23 @ 05:33]    HBsAb <3.0      [06-01-23 @ 22:50]  HBsAb Nonreact      [07-14-23 @ 10:53]  HBsAg Nonreact      [07-11-23 @ 16:58]  HBcAb Nonreact      [06-01-23 @ 22:50]  HCV 0.16, Nonreact      [07-11-23 @ 16:58]  HIV Nonreact      [06-08-23 @ 16:00]  HIV Nonreact      [06-08-23 @ 12:20]        RADIOLOGY & ADDITIONAL STUDIES:

## 2023-07-29 NOTE — CONSULT NOTE ADULT - SUBJECTIVE AND OBJECTIVE BOX
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    Blood work was done at nursing home and found out that hemoglobin was 6.4. Patient was recently started on Glimeperide at the NH.  No sign recent fevers, bleeding per PEG, thickening of the sputum or hematochezia.    In the ED the patient was hemodynamically unstable and in status epilepticus.  Versed, Ativan, keppra and propofol were given in the ED and patient was started on Valproic acid as per NeuroCrit team  MAP less than 65 patient was started on Levophed after central line insertion  Labs were significant for mild leukocytosis, severe hypoglycemia and Hemoglobin 7,2    Patient admitted for status epilepticus secondary to hypoglycemia and possible sepsis (27 Jul 2023 01:00)    Pt consulted for sacral wound    Allergies  penicillin (Other)  Intolerances      PAST MEDICAL & SURGICAL HISTORY:  HTN (hypertension)  Diabetes mellitus  H/O intracranial hemorrhage  H/O tracheostomy  PEG (percutaneous endoscopic gastrostomy) status  Hyperlipidemia      Labs:                        7.6    16.09 )-----------( 394      ( 29 Jul 2023 04:30 )             23.5       PE:  PHYSICAL EXAM:Pt on [facility] vent  non-verbal  Full thickness wound to sacrum approx 7x5x1 cm with 20% necrotic tissue, 80% granulation tissue, serosang dc, no erythema no edema, no odor  Full thickness wound to Lt ischium approx 5x4x1 cm with 20% necrotic tissue, 80% granulation tissue, serosang dc, no erythema no edema, no odor  Full thickness wound to right ischium approx 2x2 0.1 cm with granulation tissue, serosang dc        ASSESSMENT:      RECOMMENDATION:  Wound care -  Surgical debridement   IV abx as indicated/ per ID  Offloading/ positional changes  Will follow.

## 2023-07-29 NOTE — PROGRESS NOTE ADULT - ASSESSMENT
IMPRESSION:    Seizure / hypoglycemia   Chronic hypoxemic resp failure/ trach  HO ESRD on HD  Shock on Levophed   NSTEMI likely type 2  Paroxysmal Afib  H/o ICH s/p trach and PEG  H/O CAD    PLAN:    CNS:  FU VEEG.  Continue AED per neuro.  Neuro sx evaluation appreciated.      HEENT: Oral and trach care    PULMONARY: HOB 45. Aspiration.  No vent changes.  Goal saturation 92-96%. Vent dependent.  Not weanable.  Pulmonary toilet     CARDIOVASCULAR: Avoid overload.  Wean Levophed.  Increase Midodrine to 10 mg Q8     GI:  Feeding  Adjsut Bowel regimen.      RENAL: trend CMP.  Correct as needed.  Nephrology following. HD per renal     INFECTIOUS DISEASE: Monitor VS     HEMATOLOGICAL: DVT prophylaxis. Monitor CBC and Coags     ENDOCRINE: Follow up FS. Insulin protocol if needed.    DNR    Poor prognosis    SDU  IMPRESSION:    Seizure / hypoglycemia   Chronic hypoxemic resp failure/ trach  HO ESRD on HD  Shock on Levophed   NSTEMI likely type 2  Paroxysmal Afib  H/o ICH s/p trach and PEG  H/O CAD    PLAN:    CNS:  FU VEEG.  Continue AED per neuro.  Neuro sx evaluation appreciated.  Repeat CTH     HEENT: Oral and trach care    PULMONARY: HOB 45. Aspiration.  No vent changes.  Goal saturation 92-96%. Vent dependent.  Not weanable.  Pulmonary toilet     CARDIOVASCULAR: Avoid overload.  Wean Levophed.  Increase Midodrine to 10 mg Q8     GI:  Feeding  Adjsut Bowel regimen.      RENAL: trend CMP.  Correct as needed.  Nephrology following. HD per renal.  DC bumex     INFECTIOUS DISEASE: Monitor VS     HEMATOLOGICAL: DVT prophylaxis. Monitor CBC and Coags     ENDOCRINE: Follow up FS. Insulin protocol if needed.    DNR    Poor prognosis    SDU

## 2023-07-30 NOTE — CHART NOTE - NSCHARTNOTEFT_GEN_A_CORE
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    Repeat head CT shows increased ventriculomegaly, will readjust shunt today and recommend repeat head CT at 6am tomorrow.  Will follow Spoke with Dr Dial , we are recommending patient get a HCT today to evaluate the ventricle, please reconsult Neurosurgery when diagnostic studies are obtained.    Repeat head CT shows increased ventriculomegaly, will readjust shunt today and recommend repeat head CT at 6am tomorrow.  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 Camacho's wife, Patricio, she does not want to pursue additional neurosurgical procedures given Mr. Camacho's baseline poor neurologic function.  She is okay with adjustment of the current VPS but not interested in additional imaging studies leading to additional surgical procedures.  All questions answered.

## 2023-07-30 NOTE — PROGRESS NOTE ADULT - SUBJECTIVE AND OBJECTIVE BOX
24H events:    Patient is a 63y old Male who presents with a chief complaint of Seizures and Anemia (29 Jul 2023 20:11)    Primary diagnosis of Hypoglycemia       Today is hospital day 4d.      Patient seen and examined at bedside. Reports no active complaints.     PAST MEDICAL & SURGICAL HISTORY  HTN (hypertension)    Diabetes mellitus    H/O intracranial hemorrhage    H/O tracheostomy    PEG (percutaneous endoscopic gastrostomy) status    Hyperlipidemia      SOCIAL HISTORY:  Negative for smoking/alcohol/drug use.     ALLERGIES:  penicillin (Other)    MEDICATIONS:  STANDING MEDICATIONS  aMIOdarone    Tablet 200 milliGRAM(s) Oral daily  ascorbic acid 500 milliGRAM(s) Oral daily  atorvastatin 80 milliGRAM(s) Oral at bedtime  chlorhexidine 0.12% Liquid 15 milliLiter(s) Oral Mucosa every 12 hours  chlorhexidine 2% Cloths 1 Application(s) Topical <User Schedule>  collagenase Ointment 1 Application(s) Topical once  collagenase Ointment 1 Application(s) Topical two times a day  Dakins Solution - 1/4 Strength 1 Application(s) Topical daily  dextrose 5%. 1000 milliLiter(s) IV Continuous <Continuous>  dextrose 5%. 1000 milliLiter(s) IV Continuous <Continuous>  dextrose 50% Injectable 25 Gram(s) IV Push once  dextrose 50% Injectable 12.5 Gram(s) IV Push once  dextrose 50% Injectable 25 Gram(s) IV Push once  doxazosin 4 milliGRAM(s) Oral at bedtime  epoetin guanakito-epbx (RETACRIT) Injectable 81823 Unit(s) SubCutaneous every 7 days  ergocalciferol Drops 4000 Unit(s) Oral every 24 hours  ferrous    sulfate Liquid 300 milliGRAM(s) Enteral Tube daily  folic acid 1 milliGRAM(s) Oral daily  glucagon  Injectable 1 milliGRAM(s) IV Push once  glucagon  Injectable 1 milliGRAM(s) IntraMuscular once  insulin lispro (ADMELOG) corrective regimen sliding scale   SubCutaneous at bedtime  lacosamide IVPB 100 milliGRAM(s) IV Intermittent every 12 hours  midodrine 10 milliGRAM(s) Oral every 8 hours  multivitamin 1 Tablet(s) Oral daily  norepinephrine Infusion 0.05 MICROgram(s)/kG/Min IV Continuous <Continuous>  pantoprazole  Injectable 40 milliGRAM(s) IV Push two times a day  polyethylene glycol 3350 17 Gram(s) Oral daily  silver sulfADIAZINE 1% Cream 1 Application(s) Topical two times a day  valproate sodium  IVPB 750 milliGRAM(s) IV Intermittent every 8 hours    PRN MEDICATIONS  acetaminophen     Tablet .. 650 milliGRAM(s) Oral every 6 hours PRN  aluminum hydroxide/magnesium hydroxide/simethicone Suspension 30 milliLiter(s) Oral every 4 hours PRN  dextrose Oral Gel 15 Gram(s) Oral once PRN  melatonin 3 milliGRAM(s) Oral at bedtime PRN    VITALS:   T(F): 96  HR: 77  BP: 89/54  RR: 16  SpO2: 100%    LABS:                        7.6    16.09 )-----------( 394      ( 29 Jul 2023 04:30 )             23.5     07-29    134<L>  |  96<L>  |  66<HH>  ----------------------------<  74  3.2<L>   |  26  |  2.6<H>    Ca    8.5      29 Jul 2023 04:30  Phos  2.6     07-29  Mg     2.4     07-29    TPro  5.0<L>  /  Alb  2.0<L>  /  TBili  0.2  /  DBili  x   /  AST  17  /  ALT  10  /  AlkPhos  138<H>  07-29      Urinalysis Basic - ( 29 Jul 2023 04:30 )    Color: x / Appearance: x / SG: x / pH: x  Gluc: 74 mg/dL / Ketone: x  / Bili: x / Urobili: x   Blood: x / Protein: x / Nitrite: x   Leuk Esterase: x / RBC: x / WBC x   Sq Epi: x / Non Sq Epi: x / Bacteria: x      ABG - ( 29 Jul 2023 03:55 )  pH, Arterial: 7.39  pH, Blood: x     /  pCO2: 45    /  pO2: 107   / HCO3: 27    / Base Excess: 1.7   /  SaO2: 99.1                  Culture - Blood (collected 27 Jul 2023 05:19)  Source: .Blood None  Preliminary Report (29 Jul 2023 18:01):    No growth at 48 Hours          RADIOLOGY:    PHYSICAL EXAM:  GENERAL: in no acute distress, has PEG and trach tube   HEAD:  Atraumatic, Normocephalic  EYES: conjunctiva and sclera clear  ENMT: No tonsillar erythema, exudates, or enlargement; Moist mucous membranes,   NECK: Supple, No JVD, Normal thyroid  NERVOUS SYSTEM: Patient unable to follow commands  CHEST/LUNG: Clear to percussion bilaterally; No rales, rhonchi, wheezing, or rubs  HEART: Regular rate and rhythm; No murmurs, rubs, or gallops  ABDOMEN: Soft, Nontender, Nondistended; Bowel sounds present, PEG tube noted.   EXTREMITIES:  2+ Peripheral Pulses,  edema noted   LYMPH: No lymphadenopathy noted  SKIN: pressure ulcers sacrum

## 2023-07-30 NOTE — PROGRESS NOTE ADULT - SUBJECTIVE AND OBJECTIVE BOX
Patient is a 63y old  Male who presents with a chief complaint of Seizures and Anemia (30 Jul 2023 01:14)        Over Night Events:  Remains critically ill on MV.  One seizure episode this am.          ROS:     All ROS are negative except HPI         PHYSICAL EXAM    ICU Vital Signs Last 24 Hrs  T(C): 36.2 (30 Jul 2023 08:00), Max: 36.4 (29 Jul 2023 12:00)  T(F): 97.1 (30 Jul 2023 08:00), Max: 97.6 (29 Jul 2023 16:00)  HR: 83 (30 Jul 2023 08:00) (74 - 84)  BP: 97/51 (30 Jul 2023 08:00) (77/48 - 137/70)  BP(mean): 72 (30 Jul 2023 08:00) (58 - 96)  ABP: --  ABP(mean): --  RR: 20 (30 Jul 2023 08:00) (11 - 30)  SpO2: 99% (30 Jul 2023 08:00) (94% - 100%)    O2 Parameters below as of 30 Jul 2023 08:00  Patient On (Oxygen Delivery Method): ventilator    O2 Concentration (%): 40        CONSTITUTIONAL:  Ill appearing in NAD    ENT:   Airway patent,   Mouth with normal mucosa.   Trach    EYES:   Pupils equal,   Round and reactive to light.    CARDIAC:   Normal rate,   Regular rhythm.        RESPIRATORY:   No wheezing  Bilateral BS  Normal chest expansion  Not tachypneic,  No use of accessory muscles    GASTROINTESTINAL:  Abdomen soft,   Non-tender,   No guarding,   + BS    MUSCULOSKELETAL:   Range of motion is limited,  No clubbing, cyanosis    NEUROLOGICAL:   Does not follow commands     SKIN:   Skin normal color for race,   No evidence of rash.          07-29-23 @ 07:01  -  07-30-23 @ 07:00  --------------------------------------------------------  IN:    Enteral Tube Flush: 90 mL    IV PiggyBack: 300 mL    Norepinephrine: 293 mL    Propofol: 11.2 mL    Vital High Protein: 960 mL  Total IN: 1654.2 mL    OUT:    Incontinent per Collection Bag (mL): 110 mL    Rectal Tube (mL): 130 mL  Total OUT: 240 mL    Total NET: 1414.2 mL      07-30-23 @ 07:01  -  07-30-23 @ 08:26  --------------------------------------------------------  IN:    Norepinephrine: 12.3 mL    Vital High Protein: 40 mL  Total IN: 52.3 mL    OUT:  Total OUT: 0 mL    Total NET: 52.3 mL          LABS:                            7.9    13.25 )-----------( 368      ( 30 Jul 2023 05:50 )             24.7                                               07-30    136  |  98  |  76<HH>  ----------------------------<  135<H>  3.8   |  23  |  3.0<H>    Ca    8.6      30 Jul 2023 05:50  Phos  3.4     07-30  Mg     2.4     07-30    TPro  5.3<L>  /  Alb  2.0<L>  /  TBili  0.3  /  DBili  x   /  AST  20  /  ALT  10  /  AlkPhos  136<H>  07-30                                             Urinalysis Basic - ( 30 Jul 2023 05:50 )    Color: x / Appearance: x / SG: x / pH: x  Gluc: 135 mg/dL / Ketone: x  / Bili: x / Urobili: x   Blood: x / Protein: x / Nitrite: x   Leuk Esterase: x / RBC: x / WBC x   Sq Epi: x / Non Sq Epi: x / Bacteria: x                                                  LIVER FUNCTIONS - ( 30 Jul 2023 05:50 )  Alb: 2.0 g/dL / Pro: 5.3 g/dL / ALK PHOS: 136 U/L / ALT: 10 U/L / AST: 20 U/L / GGT: x                                                                                               Mode: AC/ CMV (Assist Control/ Continuous Mandatory Ventilation)  RR (machine): 14  TV (machine): 450  FiO2: 40  PEEP: 8  ITime: 1  MAP: 13  PIP: 27                                      ABG - ( 30 Jul 2023 03:22 )  pH, Arterial: 7.32  pH, Blood: x     /  pCO2: 52    /  pO2: 103   / HCO3: 27    / Base Excess: -0.1  /  SaO2: 99.2                MEDICATIONS  (STANDING):  aMIOdarone    Tablet 200 milliGRAM(s) Oral daily  ascorbic acid 500 milliGRAM(s) Oral daily  atorvastatin 80 milliGRAM(s) Oral at bedtime  chlorhexidine 0.12% Liquid 15 milliLiter(s) Oral Mucosa every 12 hours  chlorhexidine 2% Cloths 1 Application(s) Topical <User Schedule>  collagenase Ointment 1 Application(s) Topical two times a day  collagenase Ointment 1 Application(s) Topical once  Dakins Solution - 1/4 Strength 1 Application(s) Topical daily  dextrose 5%. 1000 milliLiter(s) (100 mL/Hr) IV Continuous <Continuous>  dextrose 5%. 1000 milliLiter(s) (50 mL/Hr) IV Continuous <Continuous>  dextrose 50% Injectable 12.5 Gram(s) IV Push once  dextrose 50% Injectable 25 Gram(s) IV Push once  dextrose 50% Injectable 25 Gram(s) IV Push once  doxazosin 4 milliGRAM(s) Oral at bedtime  epoetin guanakito-epbx (RETACRIT) Injectable 11760 Unit(s) SubCutaneous every 7 days  ergocalciferol Drops 4000 Unit(s) Oral every 24 hours  ferrous    sulfate Liquid 300 milliGRAM(s) Enteral Tube daily  folic acid 1 milliGRAM(s) Oral daily  glucagon  Injectable 1 milliGRAM(s) IV Push once  glucagon  Injectable 1 milliGRAM(s) IntraMuscular once  insulin lispro (ADMELOG) corrective regimen sliding scale   SubCutaneous at bedtime  lacosamide IVPB 100 milliGRAM(s) IV Intermittent every 12 hours  midodrine 10 milliGRAM(s) Oral every 8 hours  multivitamin 1 Tablet(s) Oral daily  norepinephrine Infusion 0.05 MICROgram(s)/kG/Min (8.28 mL/Hr) IV Continuous <Continuous>  pantoprazole  Injectable 40 milliGRAM(s) IV Push two times a day  polyethylene glycol 3350 17 Gram(s) Oral daily  silver sulfADIAZINE 1% Cream 1 Application(s) Topical two times a day  valproate sodium  IVPB 750 milliGRAM(s) IV Intermittent every 8 hours    MEDICATIONS  (PRN):  acetaminophen     Tablet .. 650 milliGRAM(s) Oral every 6 hours PRN Temp greater or equal to 38C (100.4F), Mild Pain (1 - 3)  aluminum hydroxide/magnesium hydroxide/simethicone Suspension 30 milliLiter(s) Oral every 4 hours PRN Dyspepsia  dextrose Oral Gel 15 Gram(s) Oral once PRN Blood Glucose LESS THAN 70 milliGRAM(s)/deciliter  melatonin 3 milliGRAM(s) Oral at bedtime PRN Insomnia

## 2023-07-30 NOTE — CHART NOTE - NSCHARTNOTEFT_GEN_A_CORE
No further NSICU needs at this time. Patient to be followed with General Neurology service for video EEG and AED management. Patient signed out to general neurology, LUIS Lowe, 7/30/2023 @ 2035.       Claudette Blanton, [facility]  [phone]

## 2023-07-30 NOTE — CHART NOTE - NSCHARTNOTEFT_GEN_A_CORE
Transfer Note: Medical Floor to Intensive Care     Transfer from: Medical Floor    Transfer to:  (  ) CCU    (  ) MICU   (  ) Telemetry     (  ) RCU                               (  ) Palliative    (  ) Stroke Unit    (  ) __________________    Accepting Physician:  dr cloud     Signout given to:       Follow up:  07/30.. 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    Blood work was done at nursing home and found out that hemoglobin was 6.4. Patient was recently started on Glimeperide at the NH.  No sign recent fevers, bleeding per PEG, thickening of the sputum or hematochezia.    In the ED the patient was hemodynamically unstable and in status epilepticus.  Versed, Ativan, keppra and propofol were given in the ED and patient was started on Valproic acid as per NeuroCrit team  MAP less than 65 patient was started on Levophed after central line insertion  Labs were significant for mild leukocytosis, severe hypoglycemia and Hemoglobin 7,2    Patient admitted for status epilepticus secondary to hypoglycemia and possible sepsis      Vital Signs Last 24 Hrs  T(C): 36.2 (30 Jul 2023 08:00), Max: 36.4 (29 Jul 2023 12:00)  T(F): 97.1 (30 Jul 2023 08:00), Max: 97.6 (29 Jul 2023 16:00)  HR: 83 (30 Jul 2023 08:00) (74 - 84)  BP: 97/51 (30 Jul 2023 08:00) (77/48 - 137/70)  BP(mean): 72 (30 Jul 2023 08:00) (58 - 96)  RR: 20 (30 Jul 2023 08:00) (11 - 30)  SpO2: 99% (30 Jul 2023 08:00) (94% - 100%)    Parameters below as of 30 Jul 2023 08:00  Patient On (Oxygen Delivery Method): ventilator    O2 Concentration (%): 40    I&O's Summary    29 Jul 2023 07:01  -  30 Jul 2023 07:00  --------------------------------------------------------  IN: 1654.2 mL / OUT: 240 mL / NET: 1414.2 mL    30 Jul 2023 07:01  -  30 Jul 2023 09:27  --------------------------------------------------------  IN: 52.3 mL / OUT: 0 mL / NET: 52.3 mL        Physical Exam:   GENERAL: in no acute distress, has PEG and trach tube   HEAD:  Atraumatic, Normocephalic  EYES: conjunctiva and sclera clear  ENMT: No tonsillar erythema, exudates, or enlargement; Moist mucous membranes,   NECK: Supple, No JVD, Normal thyroid  NERVOUS SYSTEM: Patient unable to follow commands  CHEST/LUNG: Clear to percussion bilaterally; No rales, rhonchi, wheezing, or rubs  HEART: Regular rate and rhythm; No murmurs, rubs, or gallops  ABDOMEN: Soft, Nontender, Nondistended; Bowel sounds present, PEG tube noted.   EXTREMITIES:  2+ Peripheral Pulses,  edema noted   LYMPH: No lymphadenopathy noted  SKIN: pressure ulcers sacrum     LABS:                               7.9    13.25 )-----------( 368      ( 30 Jul 2023 05:50 )             24.7       07-30    136  |  98  |  76<HH>  ----------------------------<  135<H>  3.8   |  23  |  3.0<H>    Ca    8.6      30 Jul 2023 05:50  Phos  3.4     07-30  Mg     2.4     07-30    TPro  5.3<L>  /  Alb  2.0<L>  /  TBili  0.3  /  DBili  x   /  AST  20  /  ALT  10  /  AlkPhos  136<H>  07-30          ABG - ( 30 Jul 2023 03:22 )  pH, Arterial: 7.32  pH, Blood: x     /  pCO2: 52    /  pO2: 103   / HCO3: 27    / Base Excess: -0.1  /  SaO2: 99.2                  ECG:    Telemetry:    Imaging:      ASSESSMENT & PLAN:     63-year-old male with a past medical history of hyperlipidemia, atrial fibrillation, diabetes, hypertension, large posterior fossa IPH w/ IVH/SAH/hydrocephalus, s/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  was found also to be hypoglycemic with blood sugar of 10 and had two episodes of tonic clonic seizures in the ED.   Patient non-verbal at baseline .      #Hypoglycemia possibly due to sulfonyl urea med  #GTC seizure x2.  #status epilepticus in the setting of hypoglycemia  #ESRD on glimiperide  - Blood sugar in ed: 10  - s/P multiple iv dextrose solutions   - Patient s/p Versed 4mg x1, Keppra 1gram IV x1, Ativan 4mg IV x2, with Propofol gtt initiated.  - CTH, F/U read: In comparison to the previous head CT dated 12/12/2021: Stable position of a right transparietal  shunt catheter. Interval decreased size of the lateral ventricles, now both slit-like. New predominantly low-density subdural collection over the right cerebral convexity measuring 7 mm in width, likely related to shunting. New wedge-shaped hypodensity within the left occipital lobe likely reflecting age indeterminate infarct. Redemonstrated suboccipital craniectomy. 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 Progressive bilateral cerebellar encephalomalacia. New distended appearance of the cerebral aqueduct and fourth   ventricle. New bilateral mastoid and middle ear opacification.  - video EEG: Focal and generalized slowing, Interictal activity, runs of bilateral FP ( higher amplitude from the right) rhythmic activity that appears not be physiological . ( ? of artifact). It appears as questonable low amplitude spike and aftergoing slow with modified morphology( ? possiblity of change due the the prior procedure)  - consider Ativan challenge as per NICU   - now on D10, propofol 50 and levophed. if no seizure dc propofol  - Hold ASA as per Neuro  - In the setting of ESRD, D/C Keppra to VPA load 40mg/kg then continue 500mg q12hrs  - Obtain serum AED levels   - F/U A1c, lipids, TSH, Ft4, pro-insulin and Serum Cortisol  - Follow up blood, urine and sputum cultures, procalcitonin, CRP  - Correct any underlying metabolic, infectious, or electrolyte derangements    #ESRD on Dialysis through Tesio cath   # Acute Renal Failure, started on HD 6/1, as per Renal team rec.   - daily BMP monitoring, daily weight, daily strict I/O chart  - s/p permanent HD cath. placement on 7/7/23  - fu nephro notes : Biweekly schedule . no acute indications to start CVVHD today. Get phosphorous, Vitamin D  and PTH levels. On ritacrit and venofer     # anemia of chronic disease - no gross bleeding events  - hb 7.2  - s/p 1 pack RBC  - transfuse prn for goal > 7  - On ritacrit and venofer   - Hold ASA as per Neuro    # Hx ICH   # s/p  shunt  - Bedbound from NH . chronic non-verbal , quadriplegia - continue daily care.   - c/w keppra 500mg BID for seizure prophylaxis  - fu neurosurgery   - Hold ASA as per Neuro      # chronic respiratory failure, vent dependant via trach  -  trach care,  Vent management per Pulm. team     # Chronic dysphagia  - sp peg, peg care     # Afib w/ RVR  -now rate controlled     #Acute Decompensated CHF  -continued on Amiodarone tx, rate controlled       # multiple sacral wounds and buttock   - further wound care as per Wound care specialist report, frequent body position change  - fu burn team recs    # h/o DM 2  - continue bsfs monitoring  with insulin sliding scale co.    DVT prophylaxis: scd  Activity:  Diet: DASH  Dispo:  Code: DNR Transfer Note: icu to sdu       Accepting Physician:  dr cloud     Signout given to:       Follow up:  07/30..  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    Blood work was done at nursing home and found out that hemoglobin was 6.4. Patient was recently started on Glimeperide at the NH.  No sign recent fevers, bleeding per PEG, thickening of the sputum or hematochezia.    In the ED the patient was hemodynamically unstable and in status epilepticus.  Versed, Ativan, keppra and propofol were given in the ED and patient was started on Valproic acid as per NeuroCrit team  MAP less than 65 patient was started on Levophed after central line insertion  Labs were significant for mild leukocytosis, severe hypoglycemia and Hemoglobin 7,2    Patient admitted for status epilepticus secondary to hypoglycemia and possible sepsis      Vital Signs Last 24 Hrs  T(C): 36.2 (30 Jul 2023 08:00), Max: 36.4 (29 Jul 2023 12:00)  T(F): 97.1 (30 Jul 2023 08:00), Max: 97.6 (29 Jul 2023 16:00)  HR: 83 (30 Jul 2023 08:00) (74 - 84)  BP: 97/51 (30 Jul 2023 08:00) (77/48 - 137/70)  BP(mean): 72 (30 Jul 2023 08:00) (58 - 96)  RR: 20 (30 Jul 2023 08:00) (11 - 30)  SpO2: 99% (30 Jul 2023 08:00) (94% - 100%)    Parameters below as of 30 Jul 2023 08:00  Patient On (Oxygen Delivery Method): ventilator    O2 Concentration (%): 40    I&O's Summary    29 Jul 2023 07:01  -  30 Jul 2023 07:00  --------------------------------------------------------  IN: 1654.2 mL / OUT: 240 mL / NET: 1414.2 mL    30 Jul 2023 07:01  -  30 Jul 2023 09:27  --------------------------------------------------------  IN: 52.3 mL / OUT: 0 mL / NET: 52.3 mL        Physical Exam:   GENERAL: in no acute distress, has PEG and trach tube   HEAD:  Atraumatic, Normocephalic  EYES: conjunctiva and sclera clear  ENMT: No tonsillar erythema, exudates, or enlargement; Moist mucous membranes,   NECK: Supple, No JVD, Normal thyroid  NERVOUS SYSTEM: Patient unable to follow commands  CHEST/LUNG: Clear to percussion bilaterally; No rales, rhonchi, wheezing, or rubs  HEART: Regular rate and rhythm; No murmurs, rubs, or gallops  ABDOMEN: Soft, Nontender, Nondistended; Bowel sounds present, PEG tube noted.   EXTREMITIES:  2+ Peripheral Pulses,  edema noted   LYMPH: No lymphadenopathy noted  SKIN: pressure ulcers sacrum     LABS:                               7.9    13.25 )-----------( 368      ( 30 Jul 2023 05:50 )             24.7       07-30    136  |  98  |  76<HH>  ----------------------------<  135<H>  3.8   |  23  |  3.0<H>    Ca    8.6      30 Jul 2023 05:50  Phos  3.4     07-30  Mg     2.4     07-30    TPro  5.3<L>  /  Alb  2.0<L>  /  TBili  0.3  /  DBili  x   /  AST  20  /  ALT  10  /  AlkPhos  136<H>  07-30          ABG - ( 30 Jul 2023 03:22 )  pH, Arterial: 7.32  pH, Blood: x     /  pCO2: 52    /  pO2: 103   / HCO3: 27    / Base Excess: -0.1  /  SaO2: 99.2                  ECG:    Telemetry:    Imaging:      ASSESSMENT & PLAN:     63-year-old male with a past medical history of hyperlipidemia, atrial fibrillation, diabetes, hypertension, large posterior fossa IPH w/ IVH/SAH/hydrocephalus, s/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  was found also to be hypoglycemic with blood sugar of 10 and had two episodes of tonic clonic seizures in the ED.   Patient non-verbal at baseline .      #Hypoglycemia possibly due to sulfonyl urea med  #GTC seizure x2.  #status epilepticus in the setting of hypoglycemia  #ESRD on glimiperide  - Blood sugar in ed: 10  - s/P multiple iv dextrose solutions   - Patient s/p Versed 4mg x1, Keppra 1gram IV x1, Ativan 4mg IV x2, with Propofol gtt initiated.  - CTH, F/U read: In comparison to the previous head CT dated 12/12/2021: Stable position of a right transparietal  shunt catheter. Interval decreased size of the lateral ventricles, now both slit-like. New predominantly low-density subdural collection over the right cerebral convexity measuring 7 mm in width, likely related to shunting. New wedge-shaped hypodensity within the left occipital lobe likely reflecting age indeterminate infarct. Redemonstrated suboccipital craniectomy. 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 Progressive bilateral cerebellar encephalomalacia. New distended appearance of the cerebral aqueduct and fourth   ventricle. New bilateral mastoid and middle ear opacification.  - video EEG: Focal and generalized slowing, Interictal activity, runs of bilateral FP ( higher amplitude from the right) rhythmic activity that appears not be physiological . ( ? of artifact). It appears as questonable low amplitude spike and aftergoing slow with modified morphology( ? possiblity of change due the the prior procedure)  - consider Ativan challenge as per NICU   - now on D10, propofol 50 and levophed. if no seizure dc propofol  - Hold ASA as per Neuro  - In the setting of ESRD, D/C Keppra to VPA load 40mg/kg then continue 500mg q12hrs  - Obtain serum AED levels   - F/U A1c, lipids, TSH, Ft4, pro-insulin and Serum Cortisol  - Follow up blood, urine and sputum cultures, procalcitonin, CRP  - Correct any underlying metabolic, infectious, or electrolyte derangements    #ESRD on Dialysis through Tesio cath   # Acute Renal Failure, started on HD 6/1, as per Renal team rec.   - daily BMP monitoring, daily weight, daily strict I/O chart  - s/p permanent HD cath. placement on 7/7/23  - fu nephro notes : Biweekly schedule . no acute indications to start CVVHD today. Get phosphorous, Vitamin D  and PTH levels. On ritacrit and venofer     # anemia of chronic disease - no gross bleeding events  - hb 7.2  - s/p 1 pack RBC  - transfuse prn for goal > 7  - On ritacrit and venofer   - Hold ASA as per Neuro    # Hx ICH   # s/p  shunt  - Bedbound from NH . chronic non-verbal , quadriplegia - continue daily care.   - c/w keppra 500mg BID for seizure prophylaxis  - fu neurosurgery   - Hold ASA as per Neuro      # chronic respiratory failure, vent dependant via trach  -  trach care,  Vent management per Pulm. team     # Chronic dysphagia  - sp peg, peg care     # Afib w/ RVR  -now rate controlled     #Acute Decompensated CHF  -continued on Amiodarone tx, rate controlled       # multiple sacral wounds and buttock   - further wound care as per Wound care specialist report, frequent body position change  - fu burn team recs    # h/o DM 2  - continue bsfs monitoring  with insulin sliding scale co.    DVT prophylaxis: scd  Activity:  Diet: DASH  Dispo:  Code: DNR

## 2023-07-30 NOTE — PROGRESS NOTE ADULT - ASSESSMENT
IMPRESSION:    Seizure / hypoglycemia   Chronic hypoxemic resp failure/ trach  HO ESRD on HD  Shock on Levophed   NSTEMI likely type 2  Paroxysmal Afib  H/o ICH s/p trach and PEG  H/O CAD    PLAN:    CNS:  FU VEEG.  Continue AED per neuro.  Neuro sx evaluation appreciated.  Repeat CTH unchanged    HEENT: Oral and trach care    PULMONARY: HOB 45. Aspiration.  No vent changes.  Goal saturation 92-96%. Vent dependent.  Not weanable.  Pulmonary toilet     CARDIOVASCULAR: Avoid overload.  Wean Levophed.  Increase Midodrine to 10 mg Q8     GI:  Feeding  Adjust Bowel regimen.      RENAL: trend CMP.  Correct as needed.  Nephrology following. HD per renal.     INFECTIOUS DISEASE: Monitor VS     HEMATOLOGICAL: DVT prophylaxis. Monitor CBC and Coags     ENDOCRINE: Follow up FS. Insulin protocol if needed.    DNR    Off loading.  Skin care per burn     Poor prognosis    SDU

## 2023-07-31 NOTE — EEG REPORT - NS EEG TEXT BOX
Epilepsy Attending Note:     ALICIA BARBOUR    63y Male  MRN [id number]    Vital Signs Last 24 Hrs  T(C): 35.7 (31 Jul 2023 08:09), Max: 36.8 (31 Jul 2023 00:20)  T(F): 96.2 (31 Jul 2023 08:09), Max: 98.2 (31 Jul 2023 00:20)  HR: 81 (31 Jul 2023 08:55) (80 - 92)  BP: 104/53 (31 Jul 2023 08:55) (89/55 - 119/64)  BP(mean): 78 (30 Jul 2023 21:00) (67 - 85)  RR: 16 (31 Jul 2023 08:55) (16 - 24)  SpO2: 100% (31 Jul 2023 08:55) (87% - 100%)    Parameters below as of 31 Jul 2023 08:55  Patient On (Oxygen Delivery Method): ventilator    O2 Concentration (%): 40                          7.8    14.64 )-----------( 310      ( 31 Jul 2023 05:54 )             24.3       07-31    135  |  97<L>  |  82<HH>  ----------------------------<  90  3.9   |  22  |  3.5<H>    Ca    8.3<L>      31 Jul 2023 05:54  Phos  3.4     07-30  Mg     2.6     07-31    TPro  5.2<L>  /  Alb  2.1<L>  /  TBili  0.3  /  DBili  x   /  AST  20  /  ALT  10  /  AlkPhos  138<H>  07-31      MEDICATIONS  (STANDING):  aMIOdarone    Tablet 200 milliGRAM(s) Oral daily  ascorbic acid 500 milliGRAM(s) Oral daily  atorvastatin 80 milliGRAM(s) Oral at bedtime  chlorhexidine 0.12% Liquid 15 milliLiter(s) Oral Mucosa every 12 hours  chlorhexidine 2% Cloths 1 Application(s) Topical <User Schedule>  collagenase Ointment 1 Application(s) Topical once  collagenase Ointment 1 Application(s) Topical two times a day  Dakins Solution - 1/2 Strength 1 Application(s) Topical every 12 hours  dextrose 5%. 1000 milliLiter(s) (50 mL/Hr) IV Continuous <Continuous>  dextrose 5%. 1000 milliLiter(s) (100 mL/Hr) IV Continuous <Continuous>  dextrose 50% Injectable 25 Gram(s) IV Push once  dextrose 50% Injectable 12.5 Gram(s) IV Push once  dextrose 50% Injectable 25 Gram(s) IV Push once  doxazosin 4 milliGRAM(s) Oral at bedtime  epoetin guanakito-epbx (RETACRIT) Injectable 20424 Unit(s) SubCutaneous every 7 days  ergocalciferol Drops 4000 Unit(s) Oral every 24 hours  ferrous    sulfate Liquid 300 milliGRAM(s) Enteral Tube daily  folic acid 1 milliGRAM(s) Oral daily  glucagon  Injectable 1 milliGRAM(s) IV Push once  glucagon  Injectable 1 milliGRAM(s) IntraMuscular once  insulin lispro (ADMELOG) corrective regimen sliding scale   SubCutaneous at bedtime  lacosamide IVPB 100 milliGRAM(s) IV Intermittent every 12 hours  midodrine 10 milliGRAM(s) Oral every 8 hours  multivitamin 1 Tablet(s) Oral daily  norepinephrine Infusion 0.05 MICROgram(s)/kG/Min (8.78 mL/Hr) IV Continuous <Continuous>  pantoprazole  Injectable 40 milliGRAM(s) IV Push two times a day  polyethylene glycol 3350 17 Gram(s) Oral daily  silver sulfADIAZINE 1% Cream 1 Application(s) Topical two times a day  valproate sodium  IVPB 750 milliGRAM(s) IV Intermittent every 8 hours    MEDICATIONS  (PRN):  acetaminophen     Tablet .. 650 milliGRAM(s) Oral every 6 hours PRN Temp greater or equal to 38C (100.4F), Mild Pain (1 - 3)  aluminum hydroxide/magnesium hydroxide/simethicone Suspension 30 milliLiter(s) Oral every 4 hours PRN Dyspepsia  dextrose Oral Gel 15 Gram(s) Oral once PRN Blood Glucose LESS THAN 70 milliGRAM(s)/deciliter  melatonin 3 milliGRAM(s) Oral at bedtime PRN Insomnia      Valproic Acid Level, Serum: 22.0 ug/mL [50.0 - 100.0] (07-30-23 @ 19:53)  Valproic Acid Level, Serum: 13.0 ug/mL [50.0 - 100.0] (07-29-23 @ 04:30)  Valproic Acid Level, Serum: 10.0 ug/mL [50.0 - 100.0] (07-28-23 @ 15:48)        VEEG in the last 24 hours:    Background--------------  continues, asymmetrical with higher amplitude and breach artifact from the right side and a PDR reaching 4-5 hz    Focal and generalized slowing-----1- moderate generalized slowing. Moderate right hemispheric focal slowing . Mild to moderate independent left focal slowing    Interictal activity--------  very frequent right frontal/ FC spikes presented in periodic pattern    Events-------------- none    Seizures--------none    Impression:  abnormal as above    Plan - as/neurology team

## 2023-07-31 NOTE — PROGRESS NOTE ADULT - ASSESSMENT
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  was found also to be hypoglycemic with blood sugar of 10 and had two episodes of tonic clonic seizures in the ED.   Patient non-verbal at baseline .      #Hypoglycemia possibly due to sulfonyl urea med  #GTC seizure x2.  #status epilepticus in the setting of hypoglycemia  #ESRD on glimiperide  - Blood sugar in ed: 10  - s/P multiple iv dextrose solutions   - Patient s/p Versed 4mg x1, Keppra 1gram IV x1, Ativan 4mg IV x2, with Propofol gtt initiated.  - CTH,   F/U read: In comparison to the previous head CT dated 12/12/2021: Stable position of a right transparietal  shunt catheter.   Interval decreased size of the lateral ventricles, now both slit-like.   New predominantly low-density subdural collection over the right cerebral convexity measuring 7 mm in width, likely related to shunting.   New wedge-shaped hypodensity within the left occipital lobe likely reflecting age indeterminate infarct.   Redemonstrated suboccipital craniectomy. 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   Progressive bilateral cerebellar encephalomalacia. New distended appearance of the cerebral aqueduct and fourth   ventricle. New bilateral mastoid and middle ear opacification.  - CTH 7/29:   - Stable suboccipital craniectomy and cerebellar encephalomalacia with   small stable overlying pseudomeningocele compared to the prior CT head   from 7/20/2023.  -Stable moderate dilatation of the fourth ventricle and cerebral aqueduct.   Slight interval enlargement of the supratentorial ventricles could   reflect reexpansion versus mild hydrocephalus. Unchanged positioning of   the right parietal approach ventriculostomy catheter.  -Unchanged right cerebral convexity subdural collection with stable to   decreased minimal leftward midline shift.    Redemonstrated patchy hypodensity in the left occipital lobe likely   representing acute/subacute infarct.    - video EEG: Focal and generalized slowing, Interictal activity, runs of bilateral FP ( higher amplitude from the right) rhythmic activity that appears not be physiological . ( ? of artifact). It appears as questonable low amplitude spike and aftergoing slow with modified morphology( ? possiblity of change due the the prior procedure)  - consider Ativan challenge as per NICU   - now on D10, propofol 50 and levophed. if no seizure dc propofol  - Hold ASA as per Neuro  - In the setting of ESRD, D/C Keppra to VPA load 40mg/kg then continue 500mg q12hrs  - Obtain serum AED levels   - A1c 5.7, lipids unremarkable  - fu TSH, Ft4, pro-insulin and Serum Cortisol  - BCx NGTD, procalcitonin 0.47, CRP 47    #ESRD on Dialysis through Tesio cath   # Acute Renal Failure, started on HD 6/1, as per Renal team rec.   - HD today  - daily BMP monitoring, daily weight, daily strict I/O chart  - s/p permanent HD cath. placement on 7/7/23  - fu nephro notes : Biweekly schedule . no acute indications to start CVVHD today.   - Get phosphorous, Vitamin D  and PTH levels.   - On ritacrit and venofer     # anemia of chronic disease - no gross bleeding events  - hb 7.2  - s/p 1 pack RBC  - transfuse prn for goal > 7  - On ritacrit and venofer   - Hold ASA as per Neuro    # Hx ICH   # s/p  shunt  - Bedbound from NH . chronic non-verbal , quadriplegia - continue daily care.   - c/w keppra 500mg BID for seizure prophylaxis  - fu neurosurgery   - Hold ASA as per Neuro    # chronic respiratory failure, vent dependant via trach  -  trach care,  Vent management per Pulm. team     # Chronic dysphagia  - sp peg, peg care     # Afib w/ RVR  -now rate controlled     #Acute Decompensated CHF  -continued on Amiodarone tx, rate controlled     # multiple sacral wounds and buttock  - further wound care as per Wound care specialist report, frequent body position change  - wound care per burn team recs    # h/o DM 2  - continue bsfs monitoring  with insulin sliding scale co.    DVT prophylaxis: scd  Activity:  Diet: DASH  Dispo:  Code: DNR.

## 2023-07-31 NOTE — PROGRESS NOTE ADULT - ASSESSMENT
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  was found also to be hypoglycemic with blood sugar of 10 and had two episodes of tonic clonic seizures in the ED.   Patient non-verbal at baseline . Nephrology was consulted for ESRD.     ESRD on HD  - Biweekly schedule on Monday/ Thursday   - HD today 3h UF 2l   - On levophed, BP on the low side  - Fluid overload, on ventilator low Fio2  - electrolytes reviewed / hyponatremia better     Anemia of ESRD   - Hbg 78. On RETACRIT and venofer / transfuse to keep Hb > 7    MBD  IP at at [facility]     will follow / Prognosis guarded

## 2023-07-31 NOTE — PROGRESS NOTE ADULT - SUBJECTIVE AND OBJECTIVE BOX
NUTRITION SUPPORT TEAM  -  PROGRESS NOTE   vented via trach  arms contracted  on G tube feed    tolerating enteral feeds,   abdomen soft +g-tube in place   REVIEW OF SYSTEMS:  Negative except as noted above.     VITALS:  T(F): 96.2 (07-31 @ 08:09), Max: 98.2 (07-31 @ 00:20)  HR: 81 (07-31 @ 08:55) (81 - 85)  BP: 104/53 (07-31 @ 08:55) (98/53 - 104/53)  RR: 16 (07-31 @ 08:55) (16 - 16)  SpO2: 100% (07-31 @ 08:55) (99% - 100%)  ABG - ( 30 Jul 2023 03:22 )  pH, Arterial: 7.32  pH, Blood: x     /  pCO2: 52    /  pO2: 103   / HCO3: 27    / Base Excess: -0.1  /  SaO2: 99.2    Mode: AC/ CMV (Assist Control/ Continuous Mandatory Ventilation)  RR (machine): 18  TV (machine): 450  FiO2: 40  PEEP: 8  ITime: 1  MAP: 13  PIP: 25  HEIGHT/WEIGHT/BMI:   Height (cm): 172.7 (07-27), 170.2 (06-01), 180.3 (10-04)  Weight (kg): 93.7 (07-27), 89.4 (07-17), 66.4 (10-04)  BMI (kg/m2): 31.4 (07-27), 30.9 (07-17), 22.9 (06-01), 20.4 (10-04)    07-30-23 @ 07:01  -  07-31-23 @ 07:00  --------------------------------------------------------  IN:    Enteral Tube Flush: 90 mL    IV PiggyBack: 100 mL    Norepinephrine: 12.3 mL    Norepinephrine: 59.8 mL    Vital High Protein: 960 mL  Total IN: 1222.1 mL    OUT:    Incontinent per Collection Bag (mL): 130 mL    Rectal Tube (mL): 150 mL  Total OUT: 280 mL    Total NET: 942.1 mL        MEDICATIONS:   acetaminophen     Tablet .. 650 milliGRAM(s) Oral every 6 hours PRN  aluminum hydroxide/magnesium hydroxide/simethicone Suspension 30 milliLiter(s) Oral every 4 hours PRN  aMIOdarone    Tablet 200 milliGRAM(s) Oral daily  ascorbic acid 500 milliGRAM(s) Oral daily  atorvastatin 80 milliGRAM(s) Oral at bedtime  chlorhexidine 0.12% Liquid 15 milliLiter(s) Oral Mucosa every 12 hours  chlorhexidine 2% Cloths 1 Application(s) Topical <User Schedule>  collagenase Ointment 1 Application(s) Topical once  collagenase Ointment 1 Application(s) Topical two times a day  Dakins Solution - 1/2 Strength 1 Application(s) Topical every 12 hours  doxazosin 4 milliGRAM(s) Oral at bedtime  epoetin guanakito-epbx (RETACRIT) Injectable 34422 Unit(s) SubCutaneous every 7 days  ergocalciferol Drops 4000 Unit(s) Oral every 24 hours  ferrous    sulfate Liquid 300 milliGRAM(s) Enteral Tube daily  folic acid 1 milliGRAM(s) Oral daily  glucagon  Injectable 1 milliGRAM(s) IV Push once  glucagon  Injectable 1 milliGRAM(s) IntraMuscular once  insulin lispro (ADMELOG) corrective regimen sliding scale   SubCutaneous at bedtime  lacosamide IVPB 100 milliGRAM(s) IV Intermittent every 12 hours  melatonin 3 milliGRAM(s) Oral at bedtime PRN  midodrine 10 milliGRAM(s) Oral every 8 hours  multivitamin 1 Tablet(s) Oral daily  norepinephrine Infusion 0.05 MICROgram(s)/kG/Min (8.78 mL/Hr) IV Continuous <Continuous>  pantoprazole  Injectable 40 milliGRAM(s) IV Push two times a day  polyethylene glycol 3350 17 Gram(s) Oral daily  silver sulfADIAZINE 1% Cream 1 Application(s) Topical two times a day  valproate sodium  IVPB 750 milliGRAM(s) IV Intermittent every 8 hours    LABS:                         7.8    14.64 )-----------( 310      ( 31 Jul 2023 05:54 )             24.3     135  |  97<L>  |  82<HH>  ----------------------------<  90          (07-31-23 @ 05:54)  3.9   |  22  |  3.5<H>    Ca    8.3<L>          (07-31-23 @ 05:54)  Phos  3.4         (07-30-23 @ 05:50)  Mg     2.6         (07-31-23 @ 05:54)    TPro  5.2<L>  /  Alb  2.1<L>  /  TBili  0.3  /  DBili  x   /  AST  20  /  ALT  10  /  AlkPhos  138<H>       07-31-23 @ 05:54  Triglycerides, Serum: 37 mg/dL (07-27 @ 05:33)  Vitamin D, 25-Hydroxy: 41 ng/mL (07-28 @ 05:31)  Folate: >20.0 ng/mL (06-03 @ 06:50)  Vitamin B12, Serum: 1908 pg/mL (06-06 @ 10:40)  Zinc Level, Plasma: 54 ug/dL (06-03 @ 06:50)  Blood Glucose (Past 24 hours):  105 mg/dL (07-31 @ 05:28)  117 mg/dL (07-30 @ 22:49)  135 mg/dL (07-30 @ 18:04)  140 mg/dL (07-30 @ 11:58)    DIET:   Diet, NPO with Tube Feed:   Tube Feeding Modality: Gastrostomy  Vital High Protein  Total Volume for 24 Hours (mL): 960  Continuous  Until Goal Tube Feed Rate (mL per Hour): 40  Tube Feed Duration (in Hours): 24  Tube Feed Start Time: 16:00 (07-27-23 @ 15:56) [Active]  RADIOLOGY:   < from: Xray Chest 1 View- PORTABLE-Routine (Xray Chest 1 View- PORTABLE-Routine in AM.) (07.30.23 @ 06:04) >  Impression:  Vascular congestion and bilateral pleural effusions unchanged. No air   leak.   NUTRITION SUPPORT TEAM  -  PROGRESS NOTE   vented via trach  arms contracted  on G tube feed    tolerating enteral feeds,   abdomen soft +g-tube in place   REVIEW OF SYSTEMS:  Negative except as noted above.     VITALS:  T(F): 96.2 (07-31 @ 08:09), Max: 98.2 (07-31 @ 00:20)  HR: 81 (07-31 @ 08:55) (81 - 85)  BP: 104/53 (07-31 @ 08:55) (98/53 - 104/53)  RR: 16 (07-31 @ 08:55) (16 - 16)  SpO2: 100% (07-31 @ 08:55) (99% - 100%)  ABG - ( 30 Jul 2023 03:22 )  pH, Arterial: 7.32  pH, Blood: x     /  pCO2: 52    /  pO2: 103   / HCO3: 27    / Base Excess: -0.1  /  SaO2: 99.2    Mode: AC/ CMV (Assist Control/ Continuous Mandatory Ventilation)  RR (machine): 18  TV (machine): 450  FiO2: 40  PEEP: 8  ITime: 1  MAP: 13  PIP: 25  HEIGHT/WEIGHT/BMI:   Height (cm): 172.7 (07-27), 170.2 (06-01), 180.3 (10-04)  Weight (kg): 93.7 (07-27), 89.4 (07-17), 66.4 (10-04)  BMI (kg/m2): 31.4 (07-27), 30.9 (07-17), 22.9 (06-01), 20.4 (10-04)    07-30-23 @ 07:01  -  07-31-23 @ 07:00  --------------------------------------------------------  IN:    Enteral Tube Flush: 90 mL    IV PiggyBack: 100 mL    Norepinephrine: 12.3 mL    Norepinephrine: 59.8 mL    Vital High Protein: 960 mL  Total IN: 1222.1 mL    OUT:    Incontinent per Collection Bag (mL): 130 mL    Rectal Tube (mL): 150 mL  Total OUT: 280 mL    Total NET: 942.1 mL        MEDICATIONS:   acetaminophen     Tablet .. 650 milliGRAM(s) Oral every 6 hours PRN  aluminum hydroxide/magnesium hydroxide/simethicone Suspension 30 milliLiter(s) Oral every 4 hours PRN  aMIOdarone    Tablet 200 milliGRAM(s) Oral daily  ascorbic acid 500 milliGRAM(s) Oral daily  atorvastatin 80 milliGRAM(s) Oral at bedtime  chlorhexidine 0.12% Liquid 15 milliLiter(s) Oral Mucosa every 12 hours  chlorhexidine 2% Cloths 1 Application(s) Topical <User Schedule>  collagenase Ointment 1 Application(s) Topical once  collagenase Ointment 1 Application(s) Topical two times a day  Dakins Solution - 1/2 Strength 1 Application(s) Topical every 12 hours  doxazosin 4 milliGRAM(s) Oral at bedtime  epoetin guanakito-epbx (RETACRIT) Injectable 70351 Unit(s) SubCutaneous every 7 days  ergocalciferol Drops 4000 Unit(s) Oral every 24 hours  ferrous    sulfate Liquid 300 milliGRAM(s) Enteral Tube daily  folic acid 1 milliGRAM(s) Oral daily  glucagon  Injectable 1 milliGRAM(s) IV Push once  glucagon  Injectable 1 milliGRAM(s) IntraMuscular once  insulin lispro (ADMELOG) corrective regimen sliding scale   SubCutaneous at bedtime  lacosamide IVPB 100 milliGRAM(s) IV Intermittent every 12 hours  melatonin 3 milliGRAM(s) Oral at bedtime PRN  midodrine 10 milliGRAM(s) Oral every 8 hours  multivitamin 1 Tablet(s) Oral daily  norepinephrine Infusion 0.05 MICROgram(s)/kG/Min (8.78 mL/Hr) IV Continuous <Continuous>  pantoprazole  Injectable 40 milliGRAM(s) IV Push two times a day  polyethylene glycol 3350 17 Gram(s) Oral daily  silver sulfADIAZINE 1% Cream 1 Application(s) Topical two times a day  valproate sodium  IVPB 750 milliGRAM(s) IV Intermittent every 8 hours    LABS:                         7.8    14.64 )-----------( 310      ( 31 Jul 2023 05:54 )             24.3     135  |  97<L>  |  82<HH>  ----------------------------<  90          (07-31-23 @ 05:54)  3.9   |  22  |  3.5<H>    Ca    8.3<L>          (07-31-23 @ 05:54)  Phos  3.4         (07-30-23 @ 05:50)  Mg     2.6         (07-31-23 @ 05:54)    TPro  5.2<L>  /  Alb  2.1<L>  /  TBili  0.3  /  DBili  x   /  AST  20  /  ALT  10  /  AlkPhos  138<H>       07-31-23 @ 05:54  Triglycerides, Serum: 37 mg/dL (07-27 @ 05:33)  Vitamin D, 25-Hydroxy: 41 ng/mL (07-28 @ 05:31)  Folate: >20.0 ng/mL (06-03 @ 06:50)  Vitamin B12, Serum: 1908 pg/mL (06-06 @ 10:40)  Zinc Level, Plasma: 54 ug/dL (06-03 @ 06:50)  Blood Glucose (Past 24 hours):  105 mg/dL (07-31 @ 05:28)  117 mg/dL (07-30 @ 22:49)  135 mg/dL (07-30 @ 18:04)  140 mg/dL (07-30 @ 11:58)    DIET:   Diet, NPO with Tube Feed:   Tube Feeding Modality: Gastrostomy  Vital High Protein  Total Volume for 24 Hours (mL): 960  Continuous  Until Goal Tube Feed Rate (mL per Hour): 40  Tube Feed Duration (in Hours): 24  Tube Feed Start Time: 16:00 (07-27-23 @ 15:56) [Active]  RADIOLOGY:   < from: Xray Chest 1 View- PORTABLE-Routine (Xray Chest 1 View- PORTABLE-Routine in AM.) (07.30.23 @ 06:04) >  Impression:  Vascular congestion and bilateral pleural effusions unchanged. No air   leak.   NUTRITION SUPPORT TEAM  -  PROGRESS NOTE   vented via trach - clinically not much changed  arms contracted  on G tube feed    tolerating enteral feeds,   abdomen soft +g-tube in place   REVIEW OF SYSTEMS:  Negative except as noted above.     VITALS:  T(F): 96.2 (07-31 @ 08:09), Max: 98.2 (07-31 @ 00:20)  HR: 81 (07-31 @ 08:55) (81 - 85)  BP: 104/53 (07-31 @ 08:55) (98/53 - 104/53)  RR: 16 (07-31 @ 08:55) (16 - 16)  SpO2: 100% (07-31 @ 08:55) (99% - 100%)  ABG - ( 30 Jul 2023 03:22 )  pH, Arterial: 7.32  pH, Blood: x     /  pCO2: 52    /  pO2: 103   / HCO3: 27    / Base Excess: -0.1  /  SaO2: 99.2    Mode: AC/ CMV (Assist Control/ Continuous Mandatory Ventilation)  RR (machine): 18  TV (machine): 450  FiO2: 40  PEEP: 8  ITime: 1  MAP: 13  PIP: 25  HEIGHT/WEIGHT/BMI:   Height (cm): 172.7 (07-27), 170.2 (06-01), 180.3 (10-04)  Weight (kg): 93.7 (07-27), 89.4 (07-17), 66.4 (10-04)  BMI (kg/m2): 31.4 (07-27), 30.9 (07-17), 22.9 (06-01), 20.4 (10-04)    07-30-23 @ 07:01  -  07-31-23 @ 07:00  --------------------------------------------------------  IN:    Enteral Tube Flush: 90 mL    IV PiggyBack: 100 mL    Norepinephrine: 12.3 mL    Norepinephrine: 59.8 mL    Vital High Protein: 960 mL  Total IN: 1222.1 mL    OUT:    Incontinent per Collection Bag (mL): 130 mL    Rectal Tube (mL): 150 mL  Total OUT: 280 mL    Total NET: 942.1 mL    MEDICATIONS:   acetaminophen     Tablet .. 650 milliGRAM(s) Oral every 6 hours PRN  aluminum hydroxide/magnesium hydroxide/simethicone Suspension 30 milliLiter(s) Oral every 4 hours PRN  aMIOdarone    Tablet 200 milliGRAM(s) Oral daily  ascorbic acid 500 milliGRAM(s) Oral daily  atorvastatin 80 milliGRAM(s) Oral at bedtime  chlorhexidine 0.12% Liquid 15 milliLiter(s) Oral Mucosa every 12 hours  chlorhexidine 2% Cloths 1 Application(s) Topical <User Schedule>  collagenase Ointment 1 Application(s) Topical once  collagenase Ointment 1 Application(s) Topical two times a day  Dakins Solution - 1/2 Strength 1 Application(s) Topical every 12 hours  doxazosin 4 milliGRAM(s) Oral at bedtime  epoetin guanakito-epbx (RETACRIT) Injectable 09102 Unit(s) SubCutaneous every 7 days  ergocalciferol Drops 4000 Unit(s) Oral every 24 hours  ferrous    sulfate Liquid 300 milliGRAM(s) Enteral Tube daily  folic acid 1 milliGRAM(s) Oral daily  glucagon  Injectable 1 milliGRAM(s) IV Push once  glucagon  Injectable 1 milliGRAM(s) IntraMuscular once  insulin lispro (ADMELOG) corrective regimen sliding scale   SubCutaneous at bedtime  lacosamide IVPB 100 milliGRAM(s) IV Intermittent every 12 hours  melatonin 3 milliGRAM(s) Oral at bedtime PRN  midodrine 10 milliGRAM(s) Oral every 8 hours  multivitamin 1 Tablet(s) Oral daily  norepinephrine Infusion 0.05 MICROgram(s)/kG/Min (8.78 mL/Hr) IV Continuous <Continuous>  pantoprazole  Injectable 40 milliGRAM(s) IV Push two times a day  polyethylene glycol 3350 17 Gram(s) Oral daily  silver sulfADIAZINE 1% Cream 1 Application(s) Topical two times a day  valproate sodium  IVPB 750 milliGRAM(s) IV Intermittent every 8 hours    LABS:                         7.8    14.64 )-----------( 310      ( 31 Jul 2023 05:54 )             24.3     135  |  97<L>  |  82<HH>  ----------------------------<  90          (07-31-23 @ 05:54)  3.9   |  22  |  3.5<H>    Ca    8.3<L>          (07-31-23 @ 05:54)  Phos  3.4         (07-30-23 @ 05:50)  Mg     2.6         (07-31-23 @ 05:54)    TPro  5.2<L>  /  Alb  2.1<L>  /  TBili  0.3  /  DBili  x   /  AST  20  /  ALT  10  /  AlkPhos  138<H>       07-31-23 @ 05:54  Triglycerides, Serum: 37 mg/dL (07-27 @ 05:33)  Vitamin D, 25-Hydroxy: 41 ng/mL (07-28 @ 05:31)  Folate: >20.0 ng/mL (06-03 @ 06:50)  Vitamin B12, Serum: 1908 pg/mL (06-06 @ 10:40)  Zinc Level, Plasma: 54 ug/dL (06-03 @ 06:50)  Blood Glucose (Past 24 hours):  105 mg/dL (07-31 @ 05:28)  117 mg/dL (07-30 @ 22:49)  135 mg/dL (07-30 @ 18:04)  140 mg/dL (07-30 @ 11:58)    DIET:   Diet, NPO with Tube Feed:   Tube Feeding Modality: Gastrostomy  Vital High Protein  Total Volume for 24 Hours (mL): 960  Continuous  Until Goal Tube Feed Rate (mL per Hour): 40  Tube Feed Duration (in Hours): 24  Tube Feed Start Time: 16:00 (07-27-23 @ 15:56) [Active]  RADIOLOGY:   < from: Xray Chest 1 View- PORTABLE-Routine (Xray Chest 1 View- PORTABLE-Routine in AM.) (07.30.23 @ 06:04) >  Impression:  Vascular congestion and bilateral pleural effusions unchanged. No air   leak.

## 2023-07-31 NOTE — CHART NOTE - NSCHARTNOTEFT_GEN_A_CORE
Feeds were coming out of pt's nose. Pt was in upright position, feeds were stopped. Trach and mouth suctioned. Pt maintained 100% saturation. Ordered CXR and KUB.

## 2023-07-31 NOTE — PROGRESS NOTE ADULT - SUBJECTIVE AND OBJECTIVE BOX
ALICIA BARBOUR  63y Male    CHIEF COMPLAINT:    Patient is a 63y old  Male who presents with a chief complaint of Seizures and Anemia (2023 10:19)    INTERVAL HPI/OVERNIGHT EVENTS:    Patient seen and examined. No acute events overnight. Downgraded from MICU, remains on the vent     ROS: All other systems are negative.    Vital Signs:    T(F): 96.2 (23 @ 08:09), Max: 98.2 (23 @ 00:20)  HR: 81 (23 @ 08:55) (80 - 92)  BP: 104/53 (23 @ 08:55) (89/55 - 119/64)  RR: 16 (23 @ 12:00) (16 - 24)  SpO2: 100% (23 @ 12:00) (87% - 100%)    2023 07:01  -  2023 07:00  --------------------------------------------------------  IN: 1222.1 mL / OUT: 280 mL / NET: 942.1 mL    2023 07:01  -  2023 11:34  --------------------------------------------------------  IN: 0 mL / OUT: 1000 mL / NET: -1000 mL    Daily Weight in k.2 (2023 00:20)    POCT Blood Glucose.: 105 mg/dL (2023 05:28)  POCT Blood Glucose.: 117 mg/dL (2023 22:49)  POCT Blood Glucose.: 135 mg/dL (2023 18:04)  POCT Blood Glucose.: 140 mg/dL (2023 11:58)    PHYSICAL EXAM:    GENERAL:  NAD chronically ill appearing   SKIN: No rashes or lesions  HEENT: Atraumatic. Normocephalic.    NECK: Supple, No JVD.   PULMONARY: decreased breath sounds B/L. No wheezing. No rales  CVS: Normal S1, S2. Rate and Rhythm are regular   ABDOMEN/GI: Soft, Nontender, Nondistended   MSK:  No clubbing or cyanosis   NEUROLOGIC:  does not follow commands   PSYCH: Alert & oriented x 0    Consultant(s) Notes Reviewed:  [x ] YES  [ ] NO  Care Discussed with Consultants/Other Providers [ x] YES  [ ] NO    LABS:                        7.8    14.64 )-----------( 310      ( 2023 05:54 )             24.3     135  |  97<L>  |  82<HH>  ----------------------------<  90  3.9   |  22  |  3.5<H>    Ca    8.3<L>      2023 05:54  Phos  3.4       Mg     2.6         TPro  5.2<L>  /  Alb  2.1<L>  /  TBili  0.3  /  DBili  x   /  AST  20  /  ALT  10  /  AlkPhos  138<H>      RADIOLOGY & ADDITIONAL TESTS:  Imaging or report Personally Reviewed:  [x] YES  [ ] NO  EKG reviewed: [x] YES  [ ] NO    Medications:  Standing  aMIOdarone    Tablet 200 milliGRAM(s) Oral daily  ascorbic acid 500 milliGRAM(s) Oral daily  atorvastatin 80 milliGRAM(s) Oral at bedtime  chlorhexidine 0.12% Liquid 15 milliLiter(s) Oral Mucosa every 12 hours  chlorhexidine 2% Cloths 1 Application(s) Topical <User Schedule>  collagenase Ointment 1 Application(s) Topical once  collagenase Ointment 1 Application(s) Topical two times a day  Dakins Solution - 1/2 Strength 1 Application(s) Topical every 12 hours  dextrose 5%. 1000 milliLiter(s) IV Continuous <Continuous>  dextrose 5%. 1000 milliLiter(s) IV Continuous <Continuous>  dextrose 50% Injectable 25 Gram(s) IV Push once  dextrose 50% Injectable 25 Gram(s) IV Push once  dextrose 50% Injectable 12.5 Gram(s) IV Push once  doxazosin 4 milliGRAM(s) Oral at bedtime  epoetin guanakito-epbx (RETACRIT) Injectable 43102 Unit(s) SubCutaneous every 7 days  ergocalciferol Drops 4000 Unit(s) Oral every 24 hours  ferrous    sulfate Liquid 300 milliGRAM(s) Enteral Tube daily  folic acid 1 milliGRAM(s) Oral daily  glucagon  Injectable 1 milliGRAM(s) IV Push once  glucagon  Injectable 1 milliGRAM(s) IntraMuscular once  insulin lispro (ADMELOG) corrective regimen sliding scale   SubCutaneous at bedtime  lacosamide IVPB 100 milliGRAM(s) IV Intermittent every 12 hours  midodrine 10 milliGRAM(s) Oral every 8 hours  multivitamin 1 Tablet(s) Oral daily  norepinephrine Infusion 0.05 MICROgram(s)/kG/Min IV Continuous <Continuous>  pantoprazole  Injectable 40 milliGRAM(s) IV Push two times a day  polyethylene glycol 3350 17 Gram(s) Oral daily  silver sulfADIAZINE 1% Cream 1 Application(s) Topical two times a day  valproate sodium  IVPB 750 milliGRAM(s) IV Intermittent every 8 hours    PRN Meds  acetaminophen     Tablet .. 650 milliGRAM(s) Oral every 6 hours PRN  aluminum hydroxide/magnesium hydroxide/simethicone Suspension 30 milliLiter(s) Oral every 4 hours PRN  dextrose Oral Gel 15 Gram(s) Oral once PRN  melatonin 3 milliGRAM(s) Oral at bedtime PRN             ALICIA BARBOUR  63y Male    CHIEF COMPLAINT:    Patient is a 63y old  Male who presents with a chief complaint of Seizures and Anemia (2023 10:19)    INTERVAL HPI/OVERNIGHT EVENTS:    Patient seen and examined. No acute events overnight. Downgraded from MICU, remains on the vent     ROS: All other systems are negative.    Vital Signs:    T(F): 96.2 (23 @ 08:09), Max: 98.2 (23 @ 00:20)  HR: 81 (23 @ 08:55) (80 - 92)  BP: 104/53 (23 @ 08:55) (89/55 - 119/64)  RR: 16 (23 @ 12:00) (16 - 24)  SpO2: 100% (23 @ 12:00) (87% - 100%)    2023 07:01  -  2023 07:00  --------------------------------------------------------  IN: 1222.1 mL / OUT: 280 mL / NET: 942.1 mL    2023 07:01  -  2023 11:34  --------------------------------------------------------  IN: 0 mL / OUT: 1000 mL / NET: -1000 mL    Daily Weight in k.2 (2023 00:20)    POCT Blood Glucose.: 105 mg/dL (2023 05:28)  POCT Blood Glucose.: 117 mg/dL (2023 22:49)  POCT Blood Glucose.: 135 mg/dL (2023 18:04)  POCT Blood Glucose.: 140 mg/dL (2023 11:58)    PHYSICAL EXAM:    GENERAL:  NAD chronically ill appearing   SKIN: No rashes or lesions  HEENT: Atraumatic. Normocephalic.    NECK: Supple, No JVD.   PULMONARY: decreased breath sounds B/L. No wheezing. No rales  CVS: Normal S1, S2. Rate and Rhythm are regular   ABDOMEN/GI: Soft, Nontender, Nondistended   MSK:  No clubbing or cyanosis   NEUROLOGIC:  does not follow commands L arm twitching  PSYCH: Alert & oriented x 0    Consultant(s) Notes Reviewed:  [x ] YES  [ ] NO  Care Discussed with Consultants/Other Providers [ x] YES  [ ] NO    LABS:                        7.8    14.64 )-----------( 310      ( 2023 05:54 )             24.3     135  |  97<L>  |  82<HH>  ----------------------------<  90  3.9   |  22  |  3.5<H>    Ca    8.3<L>      2023 05:54  Phos  3.4       Mg     2.6         TPro  5.2<L>  /  Alb  2.1<L>  /  TBili  0.3  /  DBili  x   /  AST  20  /  ALT  10  /  AlkPhos  138<H>      RADIOLOGY & ADDITIONAL TESTS:  Imaging or report Personally Reviewed:  [x] YES  [ ] NO  EKG reviewed: [x] YES  [ ] NO    Medications:  Standing  aMIOdarone    Tablet 200 milliGRAM(s) Oral daily  ascorbic acid 500 milliGRAM(s) Oral daily  atorvastatin 80 milliGRAM(s) Oral at bedtime  chlorhexidine 0.12% Liquid 15 milliLiter(s) Oral Mucosa every 12 hours  chlorhexidine 2% Cloths 1 Application(s) Topical <User Schedule>  collagenase Ointment 1 Application(s) Topical once  collagenase Ointment 1 Application(s) Topical two times a day  Dakins Solution - 1/2 Strength 1 Application(s) Topical every 12 hours  dextrose 5%. 1000 milliLiter(s) IV Continuous <Continuous>  dextrose 5%. 1000 milliLiter(s) IV Continuous <Continuous>  dextrose 50% Injectable 25 Gram(s) IV Push once  dextrose 50% Injectable 25 Gram(s) IV Push once  dextrose 50% Injectable 12.5 Gram(s) IV Push once  doxazosin 4 milliGRAM(s) Oral at bedtime  epoetin guanakito-epbx (RETACRIT) Injectable 15699 Unit(s) SubCutaneous every 7 days  ergocalciferol Drops 4000 Unit(s) Oral every 24 hours  ferrous    sulfate Liquid 300 milliGRAM(s) Enteral Tube daily  folic acid 1 milliGRAM(s) Oral daily  glucagon  Injectable 1 milliGRAM(s) IV Push once  glucagon  Injectable 1 milliGRAM(s) IntraMuscular once  insulin lispro (ADMELOG) corrective regimen sliding scale   SubCutaneous at bedtime  lacosamide IVPB 100 milliGRAM(s) IV Intermittent every 12 hours  midodrine 10 milliGRAM(s) Oral every 8 hours  multivitamin 1 Tablet(s) Oral daily  norepinephrine Infusion 0.05 MICROgram(s)/kG/Min IV Continuous <Continuous>  pantoprazole  Injectable 40 milliGRAM(s) IV Push two times a day  polyethylene glycol 3350 17 Gram(s) Oral daily  silver sulfADIAZINE 1% Cream 1 Application(s) Topical two times a day  valproate sodium  IVPB 750 milliGRAM(s) IV Intermittent every 8 hours    PRN Meds  acetaminophen     Tablet .. 650 milliGRAM(s) Oral every 6 hours PRN  aluminum hydroxide/magnesium hydroxide/simethicone Suspension 30 milliLiter(s) Oral every 4 hours PRN  dextrose Oral Gel 15 Gram(s) Oral once PRN  melatonin 3 milliGRAM(s) Oral at bedtime PRN

## 2023-07-31 NOTE — PROGRESS NOTE ADULT - ASSESSMENT
IMPRESSION:    Seizure / hypoglycemia on  VEEG  Chronic hypoxemic resp failure/ trach  HO ESRD on HD  Shock on Levophed   NSTEMI likely type 2  Paroxysmal Afib  H/o ICH s/p trach and PEG  H/O CAD    PLAN:    CNS:  FU VEEG.  Continue AED per neuro.  Neuro sx evaluation appreciated.  fup head CT    HEENT: Oral and trach care    PULMONARY: HOB 45. Aspiration. Increase RR 22.  Goal saturation 92-96%. Vent dependent.  Not weanable.  Pulmonary toilet     CARDIOVASCULAR: Avoid overload. Midodrine to 10 mg Q8     GI:  Feeding  Adjust Bowel regimen.      RENAL: trend CMP.  Correct as needed.  Nephrology following. HD per renal.     INFECTIOUS DISEASE: Monitor VS     HEMATOLOGICAL: DVT prophylaxis. Monitor CBC and Coags     ENDOCRINE: Follow up FS. Insulin protocol if needed.    DNR    Off loading.  Skin care per burn     Poor prognosis    SDU

## 2023-07-31 NOTE — ADVANCED PRACTICE NURSE CONSULT - RECOMMEDATIONS
Continue with Burn recommendations for sacral and bilateral ischium wounds.  Utilize offloading boots.   Maintain pressure injury prevention.   Keep skin clean.   Maintain incontinence care.   Monitor wound for changes and notify provider   Case discussed with primary RN

## 2023-07-31 NOTE — CONSULT NOTE ADULT - ATTENDING COMMENTS
Patient seen and examined and agree with above except as noted.  Patients history, notes, labs, imaging, vitals and meds reviewed personally.  Patient having pleds on VEEG and triphasic waves.  Clinically was jerking the left extremity  Otherwise not following commands    Plan as above
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  was found also to be hypoglycemic with blood sugar of 10 and had two episodes of tonic clonic seizures in the ED.   Patient non-verbal at baseline . Nephrology was consulted for ESRD.     ESRD on HD - Biweekly schedule   - On levophed, BP on the low side  -  Fluid overload, on ventilator low Fio2  - Doubt he can tolerate HD with UF, on Levophed  - no acute indications to start CVVHD today  - c/w medical treatment, will attempt HD tomorrow if BP better controlled  - Hypokalemia s/p supplementation, no further K given ESRD status     Anemia of ESRD   - Hbg 7.1. On RETACRIT and venofer      will follow

## 2023-07-31 NOTE — PROGRESS NOTE ADULT - ASSESSMENT
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  was found also to be hypoglycemic with blood sugar of 10 and had two episodes of tonic clonic seizures in the ED.   Patient non-verbal at baseline . s/p MICU monitoring, now downgraded to SDU    Hypoglycemia possibly due to sulfonyl urea  DM II  - Blood sugar in ed: 10 s/p multiple dextrose pushes /d10  - off standing insulin, on ISS  - monitor FS, avoid sulfonylurea    GTC seizure x2.  status epilepticus   large posterior fossa IPH w/ IVH/SAH/hydrocephalus  Bedbound/Nonverbal at baseline/ Chronical respiratory failure s/p trach/Peg  - Patient s/p Versed 4mg x1, Keppra 1gram IV x1, Ativan 4mg IV x2, with Propofol gtt initiated for seizures   - video EEG: Focal and generalized slowing, Interictal activity, runs of bilateral FP ( higher amplitude from the right) rhythmic activity that appears not be physiological . ( ? of artifact). It appears as questionable low amplitude spike and aftergoing slow with modified morphology( ? possibility of change due the the prior procedure)  - neurocrit and neurosurgery following   - c/w VPA 750mg IVPB and Vimpat 100mg Q12, check VPA levels  - s/p CTH pending read  - will need MRI with CSF flow study to evaluate the aqueductal hydrocephalus  - vent management per pulm     ESRD on Dialysis Monday/THursday   Acute Renal Failure, started on HD 6/1  Anemia of chronic disease  - daily BMP monitoring, daily weight, daily strict I/O   - s/p Tesio cath on 7/7/23  - HD per renal   - s/p 1u PRBC since admission, monitor CBC/Keep active T&S    Afib w/ RVR  -now rate controlled, on amiodarone, off AC due to brain bleed    Multiple sacral wounds and buttock   - burn team following, Wound care - Santyl/ Moist Kerlex packing with 1/4 Dakins / DPD BID to sacrum and left ischium  Silvadene/DPD BID to Rt Ischium, no surgical intervention     DNR, overall prognosis is very poor, continue monitoring in SDU  63-year-old male with a past medical history of hyperlipidemia, atrial fibrillation, diabetes, hypertension, large posterior fossa IPH w/ IVH/SAH/hydrocephalus, s/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  was found also to be hypoglycemic with blood sugar of 10 and had two episodes of tonic clonic seizures in the ED.   Patient non-verbal at baseline . s/p MICU monitoring, now downgraded to SDU    Hypoglycemia possibly due to sulfonyl urea  DM II  - Blood sugar in ed: 10 s/p multiple dextrose pushes /d10  - off standing insulin, on ISS  - monitor FS, avoid sulfonylurea    GTC seizure x2.  status epilepticus   large posterior fossa IPH w/ IVH/SAH/hydrocephalus  Bedbound/Nonverbal at baseline/ Chronical respiratory failure s/p trach/Peg  - Patient s/p Versed 4mg x1, Keppra 1gram IV x1, Ativan 4mg IV x2, with Propofol gtt initiated for seizures   - video EEG: Focal and generalized slowing, Interictal activity, runs of bilateral FP ( higher amplitude from the right) rhythmic activity that appears not be physiological . ( ? of artifact). It appears as questionable low amplitude spike and aftergoing slow with modified morphology( ? possibility of change due the the prior procedure)  - neurocrit and neurosurgery following   - c/w VPA 750mg IVPB and Vimpat 100mg Q12, check VPA levels  - s/p CTH pending read, on continuous VEEG  - will need MRI with CSF flow study to evaluate the aqueductal hydrocephalus  - vent management per pulm     ESRD on Dialysis Monday/THursday   Acute Renal Failure, started on HD 6/1  Anemia of chronic disease  - daily BMP monitoring, daily weight, daily strict I/O   - s/p Tesio cath on 7/7/23  - HD per renal   - s/p 1u PRBC since admission, monitor CBC/Keep active T&S    Afib w/ RVR  -now rate controlled, on amiodarone, off AC due to brain bleed    Multiple sacral wounds and buttock   - burn team following, Wound care - Santyl/ Moist Kerlex packing with 1/4 Dakins / DPD BID to sacrum and left ischium  Silvadene/DPD BID to Rt Ischium, no surgical intervention     DNR, overall prognosis is very poor, continue monitoring in SDU

## 2023-07-31 NOTE — PROGRESS NOTE ADULT - SUBJECTIVE AND OBJECTIVE BOX
ALICIA BARBOUR 63y Male  MRN#: [id number]     Hospital Day: 5d    Pt is currently admitted with the primary diagnosis of     Overnight events   -No major overnight events                                          ----------------------------------------------------------  OBJECTIVE  PAST MEDICAL & SURGICAL HISTORY  HTN (hypertension)    Diabetes mellitus    H/O intracranial hemorrhage    H/O tracheostomy    PEG (percutaneous endoscopic gastrostomy) status    Hyperlipidemia                                              -----------------------------------------------------------  MEDICATIONS:  STANDING MEDICATIONS  aMIOdarone    Tablet 200 milliGRAM(s) Oral daily  ascorbic acid 500 milliGRAM(s) Oral daily  atorvastatin 80 milliGRAM(s) Oral at bedtime  chlorhexidine 0.12% Liquid 15 milliLiter(s) Oral Mucosa every 12 hours  chlorhexidine 2% Cloths 1 Application(s) Topical <User Schedule>  collagenase Ointment 1 Application(s) Topical two times a day  collagenase Ointment 1 Application(s) Topical once  Dakins Solution - 1/2 Strength 1 Application(s) Topical every 12 hours  dextrose 5%. 1000 milliLiter(s) IV Continuous <Continuous>  dextrose 5%. 1000 milliLiter(s) IV Continuous <Continuous>  dextrose 50% Injectable 25 Gram(s) IV Push once  dextrose 50% Injectable 12.5 Gram(s) IV Push once  dextrose 50% Injectable 25 Gram(s) IV Push once  doxazosin 4 milliGRAM(s) Oral at bedtime  epoetin guanakito-epbx (RETACRIT) Injectable 73898 Unit(s) SubCutaneous every 7 days  ergocalciferol Drops 4000 Unit(s) Oral every 24 hours  ferrous    sulfate Liquid 300 milliGRAM(s) Enteral Tube daily  folic acid 1 milliGRAM(s) Oral daily  glucagon  Injectable 1 milliGRAM(s) IntraMuscular once  glucagon  Injectable 1 milliGRAM(s) IV Push once  insulin lispro (ADMELOG) corrective regimen sliding scale   SubCutaneous at bedtime  lacosamide IVPB 100 milliGRAM(s) IV Intermittent every 12 hours  midodrine 10 milliGRAM(s) Oral every 8 hours  multivitamin 1 Tablet(s) Oral daily  norepinephrine Infusion 0.05 MICROgram(s)/kG/Min IV Continuous <Continuous>  pantoprazole  Injectable 40 milliGRAM(s) IV Push two times a day  polyethylene glycol 3350 17 Gram(s) Oral daily  silver sulfADIAZINE 1% Cream 1 Application(s) Topical two times a day  valproate sodium  IVPB 750 milliGRAM(s) IV Intermittent every 8 hours    PRN MEDICATIONS  acetaminophen     Tablet .. 650 milliGRAM(s) Oral every 6 hours PRN  aluminum hydroxide/magnesium hydroxide/simethicone Suspension 30 milliLiter(s) Oral every 4 hours PRN  dextrose Oral Gel 15 Gram(s) Oral once PRN  melatonin 3 milliGRAM(s) Oral at bedtime PRN                                            ------------------------------------------------------------  VITAL SIGNS: Last 24 Hours  T(C): 35.7 (31 Jul 2023 08:09), Max: 36.8 (31 Jul 2023 00:20)  T(F): 96.2 (31 Jul 2023 08:09), Max: 98.2 (31 Jul 2023 00:20)  HR: 80 (31 Jul 2023 12:00) (80 - 92)  BP: 120/44 (31 Jul 2023 12:00) (96/52 - 120/44)  BP(mean): 78 (30 Jul 2023 21:00) (67 - 85)  RR: 16 (31 Jul 2023 12:00) (16 - 24)  SpO2: 100% (31 Jul 2023 12:00) (87% - 100%)      07-30-23 @ 07:01  -  07-31-23 @ 07:00  --------------------------------------------------------  IN: 1222.1 mL / OUT: 280 mL / NET: 942.1 mL    07-31-23 @ 07:01  -  07-31-23 @ 13:47  --------------------------------------------------------  IN: 0 mL / OUT: 1000 mL / NET: -1000 mL                                             --------------------------------------------------------------  LABS:                        7.8    14.64 )-----------( 310      ( 31 Jul 2023 05:54 )             24.3     07-31    135  |  97<L>  |  82<HH>  ----------------------------<  90  3.9   |  22  |  3.5<H>    Ca    8.3<L>      31 Jul 2023 05:54  Phos  3.4     07-30  Mg     2.6     07-31    TPro  5.2<L>  /  Alb  2.1<L>  /  TBili  0.3  /  DBili  x   /  AST  20  /  ALT  10  /  AlkPhos  138<H>  07-31      Urinalysis Basic - ( 31 Jul 2023 05:54 )    Color: x / Appearance: x / SG: x / pH: x  Gluc: 90 mg/dL / Ketone: x  / Bili: x / Urobili: x   Blood: x / Protein: x / Nitrite: x   Leuk Esterase: x / RBC: x / WBC x   Sq Epi: x / Non Sq Epi: x / Bacteria: x      ABG - ( 30 Jul 2023 03:22 )  pH, Arterial: 7.32  pH, Blood: x     /  pCO2: 52    /  pO2: 103   / HCO3: 27    / Base Excess: -0.1  /  SaO2: 99.2                                                                  --------------------------------------------------------------  PHYSICAL EXAM:  GENERAL: Asleep, NAD  HEENT: trach, no secretions  LUNGS: apicals clear  HEART: S1/S2  ABD: Soft  SKIN: swollen arms and legs    PLAN:    # DVT prophylaxis     # GI prophylaxis     # Diet     # Activity Score (AM-PAC)    #Progress Note Handoff  Pending (specify):  Family discussion:   Disposition:

## 2023-07-31 NOTE — CONSULT NOTE ADULT - ASSESSMENT
63-year-old male with above PMH admitted w SE and VEEG showing R sided PLEDs w signs of generalization on VPA 2250/day and /day, Last VPA level 22 yesterday, clinically - critically ill, on MT and with L sided jerking movements mostly concurrent w R-sided PLEDs on VEEG indicates severe focal cerebral damage and prognosis guarded.  At this point needs seizure control and taking into account his kidney issues, low albumin, low VPA level, will bolus VPA 2 g. under VEEG monitoring.    Recommendations:     - c/w VEEG  - give 2 g Valproate bolus now  - check AM Valproate trough  - c/w Vimpat 100 mg bid  - rest of tx per primary team  - neurology will follow       The case was discussed w Dr. Santos

## 2023-07-31 NOTE — ADVANCED PRACTICE NURSE CONSULT - ASSESSMENT
History of Present Illness:   63-year-old male with a past medical history of hyperlipidemia, atrial fibrillation, diabetes, hypertension, large posterior fossa IPH w/ IVH/SAH/hydrocephalus, s/p suboccipital craniectomy for PICA aneurysm resection and  shunt in 2021, CAD s/p stents on ASA, Recurrent C diff, ESRD on Dialysis through Tesio cath and is trach/PEG who was brought in from nursing home initially for anemia was found also to be hypoglycemic at 10 and had 2 episodes of tonic clonic seizures in the ED.  Blood work was done at nursing home and found out that hemoglobin was 6.4. Patient was recently started on Glimeperide at the NH. No sign recent fevers, bleeding per PEG, thickening of the sputum or hematochezia. Patient admitted for status epilepticus secondary to hypoglycemia and possible sepsis    Allergies and Intolerances:        Allergies:  	penicillin: Drug, Other, unknown reaction  	Received Cefazolin on 11/2021 for pre and post procedure without reported reaction.  	Tolerated cefepime 5/2023. Tolerated ceftolozane-tazobactam 6/2023.    Home Medications:   * Patient Currently Takes Medications as of 20-Jul-2023 13:46 documented in Structured Notes  · 	acetaminophen 325 mg oral tablet: 2 tab(s) orally every 6 hours As needed Temp greater or equal to 38C (100.4F), Mild Pain (1 - 3)  · 	collagenase 250 units/g topical ointment: 1 Apply topically to affected area 2 times a day  · 	bumetanide 2 mg oral tablet: 1 tab(s) orally once a day Hold if SBP < 100 or for symptomatic hypotension  · 	amiodarone 200 mg oral tablet: 200 milligram(s) orally once a day  · 	pantoprazole 40 mg oral delayed release tablet: 1 tab(s) orally 2 times a day  · 	Multiple Vitamins oral tablet: 1 tab(s) orally once a day via PEG  · 	aspirin 81 mg oral tablet, chewable: 1 tab(s) orally once a day  · 	atorvastatin 80 mg oral tablet: 1 tab(s) orally once a day  · 	Amaryl 1 mg oral tablet: Last Dose Taken:  , 1 tab(s) orally once a day  · 	SSD 1% topical cream: Apply topically to affected area every 12 hours  · 	zinc sulfate 220 mg oral tablet: 1 tab(s) orally once a day  · 	ascorbic acid 500 mg oral tablet: 1 tab(s) orally once a day  · 	epoetin guanakito 40,000 units/mL injectable solution: 1 milliliter(s) injectable once a week  · 	doxazosin 4 mg oral tablet: 1 tab(s) orally once a day (at bedtime)  · 	chlorhexidine 0.12% mucous membrane liquid: 15 milliliter(s) buccal 2 times a day  · 	Atrovent 18 mcg/inh inhalation aerosol: 1 unit(s) inhaled every 6 hours  · 	ergocalciferol 200 mcg/mL (8000 intl units/mL) oral solution: 0.25 milliliter(s) orally once a day  · 	folic acid 1 mg oral tablet: 1 tab(s) orally once a day  · 	albuterol 2.5 mg/3 mL (0.083%) inhalation solution: 1 unit(s) inhaled every 4 hours, As Needed  · 	ferrous sulfate 220 mg/5 mL (44 mg/5 mL elemental iron) oral elixir: 7.5 milliliter(s) by gastrostomy tube once a day  · 	Keppra 100 mg/mL oral solution: 5 milliliter(s) by gastrostomy tube 2 times a day    Patient received lying in bed. Lethargic. Limited mobility. Incontinent of urine and stool. High risk for pressure injury.     Assessed bilateral foot, no pressure injuries at time of assessment. Blanchable redness to right heel. Dry callus to right foot.     Burn following for sacral and bilateral ischium pressure ulcers.

## 2023-07-31 NOTE — CHART NOTE - NSCHARTNOTEFT_GEN_A_CORE
63-year-old male with a past medical history of hyperlipidemia, atrial fibrillation, diabetes, hypertension, large posterior fossa IPH w/ IVH/SAH/hydrocephalus, s/p suboccipital craniectomy for PICA aneurysm clipping  and  shunt in 2021, CAD s/p stents on ASA, Recurrent C diff, ESRD on Dialysis through Tesio cath and is trach/PEG who was brought in from nursing home initially for anemia was found also to be hypoglycemic at 10 and had 2 episodes of tonic clonic seizures in the ED.      < from: CT Head No Cont (07.30.23 @ 15:58) >  IMPRESSION:  Slight further enlargement of the supratentorial ventricles since the   prior CT head from 7/29/2023. This could reflect reexpansion of the   ventricles versus mild hydrocephalus. Stable positioning of the right   parietal approach ventriculostomy catheter.  Unchanged thin subdural hemorrhage along the right convexity and minimal   midline shift to the left.  Stable suboccipital craniectomy and small pseudomeningocele.  Redemonstrated small left occipital patchy hyperdensity which could   reflect subacute infarct.    PLAN   - Adjusted VPS settings Certas from 8 to 6   - Obtain CTH tomorrow 8/1 around 1PM   - Discussed case with attending 63-year-old male with a past medical history of hyperlipidemia, atrial fibrillation, diabetes, hypertension, large posterior fossa IPH w/ IVH/SAH/hydrocephalus, s/p suboccipital craniectomy for PICA aneurysm clipping  and  shunt in 2021, CAD s/p stents on ASA, Recurrent C diff, ESRD on Dialysis through Tesio cath and is trach/PEG who was brought in from nursing home initially for anemia was found also to be hypoglycemic at 10 and had 2 episodes of tonic clonic seizures in the ED.      < from: CT Head No Cont (07.30.23 @ 15:58) >  IMPRESSION:  Slight further enlargement of the supratentorial ventricles since the   prior CT head from 7/29/2023. This could reflect reexpansion of the   ventricles versus mild hydrocephalus. Stable positioning of the right   parietal approach ventriculostomy catheter.  Unchanged thin subdural hemorrhage along the right convexity and minimal   midline shift to the left.  Stable suboccipital craniectomy and small pseudomeningocele.  Redemonstrated small left occipital patchy hyperdensity which could   reflect subacute infarct.    PLAN   - Adjusted VPS settings Certas from 8 to 6    - Obtain CTH tomorrow 8/1 at 6AM   - Discussed case with attending

## 2023-07-31 NOTE — PROGRESS NOTE ADULT - SUBJECTIVE AND OBJECTIVE BOX
Nephrology progress note    Patient is seen and examined, events over the last 24 h noted .  Lying in bed   on MV     Allergies:  penicillin (Other)    Hospital Medications:   MEDICATIONS  (STANDING):  aMIOdarone    Tablet 200 milliGRAM(s) Oral daily  ascorbic acid 500 milliGRAM(s) Oral daily  atorvastatin 80 milliGRAM(s) Oral at bedtime  collagenase Ointment 1 Application(s) Topical once  collagenase Ointment 1 Application(s) Topical two times a day  Dakins Solution - 1/2 Strength 1 Application(s) Topical every 12 hours  doxazosin 4 milliGRAM(s) Oral at bedtime  epoetin guanakito-epbx (RETACRIT) Injectable 12137 Unit(s) SubCutaneous every 7 days  ergocalciferol Drops 4000 Unit(s) Oral every 24 hours  ferrous    sulfate Liquid 300 milliGRAM(s) Enteral Tube daily  folic acid 1 milliGRAM(s) Oral daily  glucagon  Injectable 1 milliGRAM(s) IV Push once  glucagon  Injectable 1 milliGRAM(s) IntraMuscular once  insulin lispro (ADMELOG) corrective regimen sliding scale   SubCutaneous at bedtime  lacosamide IVPB 100 milliGRAM(s) IV Intermittent every 12 hours  midodrine 10 milliGRAM(s) Oral every 8 hours  multivitamin 1 Tablet(s) Oral daily  norepinephrine Infusion 0.05 MICROgram(s)/kG/Min (8.78 mL/Hr) IV Continuous <Continuous>  pantoprazole  Injectable 40 milliGRAM(s) IV Push two times a day  polyethylene glycol 3350 17 Gram(s) Oral daily  silver sulfADIAZINE 1% Cream 1 Application(s) Topical two times a day  valproate sodium  IVPB 750 milliGRAM(s) IV Intermittent every 8 hours        VITALS:  T(F): 96.2 (07-31-23 @ 08:09), Max: 98.2 (07-31-23 @ 00:20)  HR: 82 (07-31-23 @ 08:09)  BP: 102/52 (07-31-23 @ 08:09)  RR: 16 (07-31-23 @ 08:09)  SpO2: 100% (07-31-23 @ 08:09)      07-29 @ 07:01  -  07-30 @ 07:00  --------------------------------------------------------  IN: 1654.2 mL / OUT: 240 mL / NET: 1414.2 mL    07-30 @ 07:01  -  07-31 @ 07:00  --------------------------------------------------------  IN: 1222.1 mL / OUT: 280 mL / NET: 942.1 mL          PHYSICAL EXAM:  Constitutional: NAD/ trached   Respiratory: CTAB  Cardiovascular: S1, S2, RRR  Gastrointestinal: BS+, soft, NT/ND  Extremities: No cyanosis or clubbing. No peripheral edema  :  No teixeira.   Skin: No rashes    LABS:  07-31    135  |  97<L>  |  82<HH>  ----------------------------<  90  3.9   |  22  |  3.5<H>    Ca    8.3<L>      31 Jul 2023 05:54  Phos  3.4     07-30  Mg     2.6     07-31    TPro  5.2<L>  /  Alb  2.1<L>  /  TBili  0.3  /  DBili      /  AST  20  /  ALT  10  /  AlkPhos  138<H>  07-31                          7.8    14.64 )-----------( 310      ( 31 Jul 2023 05:54 )             24.3       Urine Studies:  Urinalysis Basic - ( 31 Jul 2023 05:54 )    Color:  / Appearance:  / SG:  / pH:   Gluc: 90 mg/dL / Ketone:   / Bili:  / Urobili:    Blood:  / Protein:  / Nitrite:    Leuk Esterase:  / RBC:  / WBC    Sq Epi:  / Non Sq Epi:  / Bacteria:           Iron 49, TIBC 138, %sat 36      [07-28-23 @ 05:31]  Ferritin 1199      [07-28-23 @ 05:31]  PTH -- (Ca 8.9)      [07-28-23 @ 05:31]   20  Vitamin D (25OH) 41      [07-28-23 @ 05:31]  TSH 5.37      [07-27-23 @ 05:33]  Lipid: chol 72, TG 37, HDL 42, LDL --      [07-27-23 @ 05:33]    HBsAb <3.0      [06-01-23 @ 22:50]  HBsAb Nonreact      [07-14-23 @ 10:53]  HBsAg Nonreact      [07-11-23 @ 16:58]  HBcAb Nonreact      [06-01-23 @ 22:50]  HCV 0.16, Nonreact      [07-11-23 @ 16:58]  HIV Nonreact      [06-08-23 @ 16:00]  HIV Nonreact      [06-08-23 @ 12:20]        RADIOLOGY & ADDITIONAL STUDIES:

## 2023-07-31 NOTE — CONSULT NOTE ADULT - SUBJECTIVE AND OBJECTIVE BOX
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    Blood work was done at nursing home and found out that hemoglobin was 6.4. Patient was recently started on Glimeperide at the NH.  No sign recent fevers, bleeding per PEG, thickening of the sputum or hematochezia.    In the ED the patient was hemodynamically unstable and in status epilepticus.  Versed, Ativan, keppra and propofol were given in the ED and patient was started on Valproic acid as per NeuroCrit team  MAP less than 65 patient was started on Levophed after central line insertion  Labs were significant for mild leukocytosis, severe hypoglycemia and Hemoglobin 7,2    Patient admitted for status epilepticus secondary to hypoglycemia and possible sepsis (27 Jul 2023 01:00)     MEDICATIONS  Home Medications:  acetaminophen 325 mg oral tablet: 2 tab(s) orally every 6 hours As needed Temp greater or equal to 38C (100.4F), Mild Pain (1 - 3) (27 Jul 2023 11:12)  albuterol 2.5 mg/3 mL (0.083%) inhalation solution: 1 unit(s) inhaled every 4 hours, As Needed (27 Jul 2023 11:12)  Amaryl 1 mg oral tablet: 1 tab(s) orally once a day (27 Jul 2023 00:55)  amiodarone 200 mg oral tablet: 200 milligram(s) orally once a day (27 Jul 2023 11:12)  ascorbic acid 500 mg oral tablet: 1 tab(s) orally once a day (27 Jul 2023 11:12)  aspirin 81 mg oral tablet, chewable: 1 tab(s) orally once a day (27 Jul 2023 11:12)  atorvastatin 80 mg oral tablet: 1 tab(s) orally once a day (27 Jul 2023 11:12)  Atrovent 18 mcg/inh inhalation aerosol: 1 unit(s) inhaled every 6 hours (27 Jul 2023 11:12)  bumetanide 2 mg oral tablet: 1 tab(s) orally once a day Hold if SBP &lt; 100 or for symptomatic hypotension (27 Jul 2023 11:12)  chlorhexidine 0.12% mucous membrane liquid: 15 milliliter(s) buccal 2 times a day (27 Jul 2023 11:12)  doxazosin 4 mg oral tablet: 1 tab(s) orally once a day (at bedtime) (27 Jul 2023 11:12)  ergocalciferol 200 mcg/mL (8000 intl units/mL) oral solution: 0.25 milliliter(s) orally once a day (27 Jul 2023 11:12)  ferrous sulfate 220 mg/5 mL (44 mg/5 mL elemental iron) oral elixir: 7.5 milliliter(s) by gastrostomy tube once a day (27 Jul 2023 11:12)  folic acid 1 mg oral tablet: 1 tab(s) orally once a day (27 Jul 2023 11:12)  Keppra 100 mg/mL oral solution: 5 milliliter(s) by gastrostomy tube 2 times a day (27 Jul 2023 11:12)  Multiple Vitamins oral tablet: 1 tab(s) orally once a day via PEG (27 Jul 2023 11:12)  pantoprazole 40 mg oral delayed release tablet: 1 tab(s) orally 2 times a day (27 Jul 2023 11:12)  polyethylene glycol 3350 oral powder for reconstitution: 17 gram(s) orally once a day (27 Jul 2023 11:07)  Retacrit 40,000 units/mL preservative-free injectable solution: 40,000 unit(s) intravenously 2 times a week monday, thursday (27 Jul 2023 11:06)  SSD 1% topical cream: Apply topically to affected area every 12 hours (27 Jul 2023 11:12)  Venofer 20 mg/mL intravenous solution: 100 milligram(s) intravenously once a week on wednesday (27 Jul 2023 11:11)  zinc sulfate 220 mg oral tablet: 1 tab(s) orally once a day (27 Jul 2023 11:12)    MEDICATIONS  (STANDING):  aMIOdarone    Tablet 200 milliGRAM(s) Oral daily  ascorbic acid 500 milliGRAM(s) Oral daily  atorvastatin 80 milliGRAM(s) Oral at bedtime  chlorhexidine 0.12% Liquid 15 milliLiter(s) Oral Mucosa every 12 hours  chlorhexidine 2% Cloths 1 Application(s) Topical <User Schedule>  collagenase Ointment 1 Application(s) Topical once  collagenase Ointment 1 Application(s) Topical two times a day  Dakins Solution - 1/2 Strength 1 Application(s) Topical every 12 hours  dextrose 5%. 1000 milliLiter(s) (50 mL/Hr) IV Continuous <Continuous>  dextrose 5%. 1000 milliLiter(s) (100 mL/Hr) IV Continuous <Continuous>  dextrose 50% Injectable 25 Gram(s) IV Push once  dextrose 50% Injectable 12.5 Gram(s) IV Push once  dextrose 50% Injectable 25 Gram(s) IV Push once  doxazosin 4 milliGRAM(s) Oral at bedtime  epoetin guanakito-epbx (RETACRIT) Injectable 19579 Unit(s) SubCutaneous every 7 days  ergocalciferol Drops 4000 Unit(s) Oral every 24 hours  ferrous    sulfate Liquid 300 milliGRAM(s) Enteral Tube daily  folic acid 1 milliGRAM(s) Oral daily  glucagon  Injectable 1 milliGRAM(s) IV Push once  glucagon  Injectable 1 milliGRAM(s) IntraMuscular once  insulin lispro (ADMELOG) corrective regimen sliding scale   SubCutaneous at bedtime  lacosamide IVPB 100 milliGRAM(s) IV Intermittent every 12 hours  midodrine 10 milliGRAM(s) Oral every 8 hours  multivitamin 1 Tablet(s) Oral daily  norepinephrine Infusion 0.05 MICROgram(s)/kG/Min (8.78 mL/Hr) IV Continuous <Continuous>  pantoprazole  Injectable 40 milliGRAM(s) IV Push two times a day  polyethylene glycol 3350 17 Gram(s) Oral daily  silver sulfADIAZINE 1% Cream 1 Application(s) Topical two times a day  valproate sodium  IVPB 750 milliGRAM(s) IV Intermittent every 8 hours    MEDICATIONS  (PRN):  acetaminophen     Tablet .. 650 milliGRAM(s) Oral every 6 hours PRN Temp greater or equal to 38C (100.4F), Mild Pain (1 - 3)  aluminum hydroxide/magnesium hydroxide/simethicone Suspension 30 milliLiter(s) Oral every 4 hours PRN Dyspepsia  dextrose Oral Gel 15 Gram(s) Oral once PRN Blood Glucose LESS THAN 70 milliGRAM(s)/deciliter  melatonin 3 milliGRAM(s) Oral at bedtime PRN Insomnia      FAMILY HISTORY:    SOCIAL HISTORY: negative for tobacco, alcohol, or ilicit drug use.    Allergies    penicillin (Other)    Intolerances      ******INCOMPLETE NOTE, PLEASE DISREGARD THE ASSESSMENT AND RECOMMENDATIONS!!!!******    GEN: NAD, pleasant, cooperative    NEUROLOGICAL EXAMINATION:  GENERAL:  Appearance is consistent with chronologic age.   COGNITION/LANGUAGE:  Awake, alert, and oriented to person, place, time and date.  Fluent, intact comprehension, repetition, naming. Recent and remote memory intact.  Fund of knowledge is appropriate.  Nondysarthric.    CRANIAL NERVES:   - Eyes:  Visual acuity intact. Pupils equal round and reactive, no RAPD. EOMI w/o nystagmus, skew or reported double vision. Normal visual field on confrontation. No ptosis/weakness of eyelid closure.   - Face:  Facial sensation normal V1 - 3, no facial asymmetry.    - Ears/Nose/Throat:  Hearing grossly intact b/l to finger rub.  Palate elevates midline.  Tongue and uvula midline.   MOTOR EXAM:  (R/L) 5/5 UE; 5/5 LE.  No observable drift. Normal tone and bulk. No tenderness, twitching, tremors or involuntary movements.  SENSORY EXAM:  Intact to light touch and pinprick, pain, temperature and proprioception and vibration in all extremities.  REFLEXES:   2+ b/l biceps, triceps, patella and achilles.  Plantar response downgoing b/l.  Jaw jerk, Nicholas, clonus absent.  CEREBELLUM:  Finger to nose/Heel to knee and shin intact.  No dysmetria.    GAIT: narrow based and normal.  Romberg: negative.   NIHSS: **** ASPECT Score: ***** ICH Score: ***** (GCS)    LABS:                        7.8    14.64 )-----------( 310      ( 31 Jul 2023 05:54 )             24.3     07-31    135  |  97<L>  |  82<HH>  ----------------------------<  90  3.9   |  22  |  3.5<H>    Ca    8.3<L>      31 Jul 2023 05:54  Phos  3.4     07-30  Mg     2.6     07-31    TPro  5.2<L>  /  Alb  2.1<L>  /  TBili  0.3  /  DBili  x   /  AST  20  /  ALT  10  /  AlkPhos  138<H>  07-31    Hemoglobin A1C:   Vitamin B12     CAPILLARY BLOOD GLUCOSE      POCT Blood Glucose.: 105 mg/dL (31 Jul 2023 05:28)      Urinalysis Basic - ( 31 Jul 2023 05:54 )    Color: x / Appearance: x / SG: x / pH: x  Gluc: 90 mg/dL / Ketone: x  / Bili: x / Urobili: x   Blood: x / Protein: x / Nitrite: x   Leuk Esterase: x / RBC: x / WBC x   Sq Epi: x / Non Sq Epi: x / Bacteria: x        ABG - ( 30 Jul 2023 03:22 )  pH, Arterial: 7.32  pH, Blood: x     /  pCO2: 52    /  pO2: 103   / HCO3: 27    / Base Excess: -0.1  /  SaO2: 99.2                Microbiology:      RADIOLOGY, EKG AND ADDITIONAL TESTS: Reviewed.           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    Blood work was done at nursing home and found out that hemoglobin was 6.4. Patient was recently started on Glimeperide at the NH.  No sign recent fevers, bleeding per PEG, thickening of the sputum or hematochezia.    In the ED the patient was hemodynamically unstable and in status epilepticus.  Versed, Ativan, keppra and propofol were given in the ED and patient was started on Valproic acid as per NeuroCrit team  MAP less than 65 patient was started on Levophed after central line insertion  Labs were significant for mild leukocytosis, severe hypoglycemia and Hemoglobin 7,2    Patient admitted for status epilepticus secondary to hypoglycemia and possible sepsis (27 Jul 2023 01:00). last  VEEG: very frequent right frontal/ FC spikes presented in periodic pattern      MEDICATIONS  Home Medications:  acetaminophen 325 mg oral tablet: 2 tab(s) orally every 6 hours As needed Temp greater or equal to 38C (100.4F), Mild Pain (1 - 3) (27 Jul 2023 11:12)  albuterol 2.5 mg/3 mL (0.083%) inhalation solution: 1 unit(s) inhaled every 4 hours, As Needed (27 Jul 2023 11:12)  Amaryl 1 mg oral tablet: 1 tab(s) orally once a day (27 Jul 2023 00:55)  amiodarone 200 mg oral tablet: 200 milligram(s) orally once a day (27 Jul 2023 11:12)  ascorbic acid 500 mg oral tablet: 1 tab(s) orally once a day (27 Jul 2023 11:12)  aspirin 81 mg oral tablet, chewable: 1 tab(s) orally once a day (27 Jul 2023 11:12)  atorvastatin 80 mg oral tablet: 1 tab(s) orally once a day (27 Jul 2023 11:12)  Atrovent 18 mcg/inh inhalation aerosol: 1 unit(s) inhaled every 6 hours (27 Jul 2023 11:12)  bumetanide 2 mg oral tablet: 1 tab(s) orally once a day Hold if SBP &lt; 100 or for symptomatic hypotension (27 Jul 2023 11:12)  chlorhexidine 0.12% mucous membrane liquid: 15 milliliter(s) buccal 2 times a day (27 Jul 2023 11:12)  doxazosin 4 mg oral tablet: 1 tab(s) orally once a day (at bedtime) (27 Jul 2023 11:12)  ergocalciferol 200 mcg/mL (8000 intl units/mL) oral solution: 0.25 milliliter(s) orally once a day (27 Jul 2023 11:12)  ferrous sulfate 220 mg/5 mL (44 mg/5 mL elemental iron) oral elixir: 7.5 milliliter(s) by gastrostomy tube once a day (27 Jul 2023 11:12)  folic acid 1 mg oral tablet: 1 tab(s) orally once a day (27 Jul 2023 11:12)  Keppra 100 mg/mL oral solution: 5 milliliter(s) by gastrostomy tube 2 times a day (27 Jul 2023 11:12)  Multiple Vitamins oral tablet: 1 tab(s) orally once a day via PEG (27 Jul 2023 11:12)  pantoprazole 40 mg oral delayed release tablet: 1 tab(s) orally 2 times a day (27 Jul 2023 11:12)  polyethylene glycol 3350 oral powder for reconstitution: 17 gram(s) orally once a day (27 Jul 2023 11:07)  Retacrit 40,000 units/mL preservative-free injectable solution: 40,000 unit(s) intravenously 2 times a week monday, thursday (27 Jul 2023 11:06)  SSD 1% topical cream: Apply topically to affected area every 12 hours (27 Jul 2023 11:12)  Venofer 20 mg/mL intravenous solution: 100 milligram(s) intravenously once a week on wednesday (27 Jul 2023 11:11)  zinc sulfate 220 mg oral tablet: 1 tab(s) orally once a day (27 Jul 2023 11:12)    MEDICATIONS  (STANDING):  aMIOdarone    Tablet 200 milliGRAM(s) Oral daily  ascorbic acid 500 milliGRAM(s) Oral daily  atorvastatin 80 milliGRAM(s) Oral at bedtime  chlorhexidine 0.12% Liquid 15 milliLiter(s) Oral Mucosa every 12 hours  chlorhexidine 2% Cloths 1 Application(s) Topical <User Schedule>  collagenase Ointment 1 Application(s) Topical once  collagenase Ointment 1 Application(s) Topical two times a day  Dakins Solution - 1/2 Strength 1 Application(s) Topical every 12 hours  dextrose 5%. 1000 milliLiter(s) (50 mL/Hr) IV Continuous <Continuous>  dextrose 5%. 1000 milliLiter(s) (100 mL/Hr) IV Continuous <Continuous>  dextrose 50% Injectable 25 Gram(s) IV Push once  dextrose 50% Injectable 12.5 Gram(s) IV Push once  dextrose 50% Injectable 25 Gram(s) IV Push once  doxazosin 4 milliGRAM(s) Oral at bedtime  epoetin guanakito-epbx (RETACRIT) Injectable 24517 Unit(s) SubCutaneous every 7 days  ergocalciferol Drops 4000 Unit(s) Oral every 24 hours  ferrous    sulfate Liquid 300 milliGRAM(s) Enteral Tube daily  folic acid 1 milliGRAM(s) Oral daily  glucagon  Injectable 1 milliGRAM(s) IV Push once  glucagon  Injectable 1 milliGRAM(s) IntraMuscular once  insulin lispro (ADMELOG) corrective regimen sliding scale   SubCutaneous at bedtime  lacosamide IVPB 100 milliGRAM(s) IV Intermittent every 12 hours  midodrine 10 milliGRAM(s) Oral every 8 hours  multivitamin 1 Tablet(s) Oral daily  norepinephrine Infusion 0.05 MICROgram(s)/kG/Min (8.78 mL/Hr) IV Continuous <Continuous>  pantoprazole  Injectable 40 milliGRAM(s) IV Push two times a day  polyethylene glycol 3350 17 Gram(s) Oral daily  silver sulfADIAZINE 1% Cream 1 Application(s) Topical two times a day  valproate sodium  IVPB 750 milliGRAM(s) IV Intermittent every 8 hours    MEDICATIONS  (PRN):  acetaminophen     Tablet .. 650 milliGRAM(s) Oral every 6 hours PRN Temp greater or equal to 38C (100.4F), Mild Pain (1 - 3)  aluminum hydroxide/magnesium hydroxide/simethicone Suspension 30 milliLiter(s) Oral every 4 hours PRN Dyspepsia  dextrose Oral Gel 15 Gram(s) Oral once PRN Blood Glucose LESS THAN 70 milliGRAM(s)/deciliter  melatonin 3 milliGRAM(s) Oral at bedtime PRN Insomnia      FAMILY HISTORY:    SOCIAL HISTORY: negative for tobacco, alcohol, or ilicit drug use.    Allergies    penicillin (Other)    Intolerances      ******INCOMPLETE NOTE, PLEASE DISREGARD THE ASSESSMENT AND RECOMMENDATIONS!!!!******    GEN: NAD, pleasant, cooperative    NEUROLOGICAL EXAMINATION:  GENERAL:  Appearance is consistent with chronologic age.   COGNITION/LANGUAGE:  Awake, alert, and oriented to person, place, time and date.  Fluent, intact comprehension, repetition, naming. Recent and remote memory intact.  Fund of knowledge is appropriate.  Nondysarthric.    CRANIAL NERVES:   - Eyes:  Visual acuity intact. Pupils equal round and reactive, no RAPD. EOMI w/o nystagmus, skew or reported double vision. Normal visual field on confrontation. No ptosis/weakness of eyelid closure.   - Face:  Facial sensation normal V1 - 3, no facial asymmetry.    - Ears/Nose/Throat:  Hearing grossly intact b/l to finger rub.  Palate elevates midline.  Tongue and uvula midline.   MOTOR EXAM:  (R/L) 5/5 UE; 5/5 LE.  No observable drift. Normal tone and bulk. No tenderness, twitching, tremors or involuntary movements.  SENSORY EXAM:  Intact to light touch and pinprick, pain, temperature and proprioception and vibration in all extremities.  REFLEXES:   2+ b/l biceps, triceps, patella and achilles.  Plantar response downgoing b/l.  Jaw jerk, Nicholas, clonus absent.  CEREBELLUM:  Finger to nose/Heel to knee and shin intact.  No dysmetria.    GAIT: narrow based and normal.  Romberg: negative.   NIHSS: **** ASPECT Score: ***** ICH Score: ***** (GCS)    LABS:                        7.8    14.64 )-----------( 310      ( 31 Jul 2023 05:54 )             24.3     07-31    135  |  97<L>  |  82<HH>  ----------------------------<  90  3.9   |  22  |  3.5<H>    Ca    8.3<L>      31 Jul 2023 05:54  Phos  3.4     07-30  Mg     2.6     07-31    TPro  5.2<L>  /  Alb  2.1<L>  /  TBili  0.3  /  DBili  x   /  AST  20  /  ALT  10  /  AlkPhos  138<H>  07-31    Hemoglobin A1C:   Vitamin B12     CAPILLARY BLOOD GLUCOSE      POCT Blood Glucose.: 105 mg/dL (31 Jul 2023 05:28)      Urinalysis Basic - ( 31 Jul 2023 05:54 )    Color: x / Appearance: x / SG: x / pH: x  Gluc: 90 mg/dL / Ketone: x  / Bili: x / Urobili: x   Blood: x / Protein: x / Nitrite: x   Leuk Esterase: x / RBC: x / WBC x   Sq Epi: x / Non Sq Epi: x / Bacteria: x        ABG - ( 30 Jul 2023 03:22 )  pH, Arterial: 7.32  pH, Blood: x     /  pCO2: 52    /  pO2: 103   / HCO3: 27    / Base Excess: -0.1  /  SaO2: 99.2                Microbiology:      RADIOLOGY, EKG AND ADDITIONAL TESTS: Reviewed.           NEUROLOGY CONSULT    HPI:  "63-year-old male with a past medical history of hyperlipidemia, atrial fibrillation, diabetes, hypertension, large posterior fossa IPH w/ IVH/SAH/hydrocephalus, s/p suboccipital craniectomy for PICA aneurysm resection and  shunt in 2021, CAD s/p stents on ASA, Recurrent C diff, ESRD on Dialysis through Tesio cath and is trach/PEG who was brought in from nursing home initially for anemia was found also to be hypoglycemic at 10 and had 2 episodes of tonic clonic seizures in the ED.    Blood work was done at nursing home and found out that hemoglobin was 6.4. Patient was recently started on Glimeperide at the NH.  In the ED the patient was hemodynamically unstable and in status epilepticus.  Versed, Ativan, keppra and propofol were given in the ED and patient was started on Valproic acid as per NeuroCrit team  MAP less than 65 patient was started on Levophed after central line insertion  Labs were significant for mild leukocytosis, severe hypoglycemia and Hemoglobin 7,2"    Patient admitted for status epilepticus secondary to hypoglycemia and possible sepsis (27 Jul 2023 01:00). latest VEEG: very frequent right frontal/ FC spikes presented in periodic pattern      MEDICATIONS  Home Medications:  acetaminophen 325 mg oral tablet: 2 tab(s) orally every 6 hours As needed Temp greater or equal to 38C (100.4F), Mild Pain (1 - 3) (27 Jul 2023 11:12)  albuterol 2.5 mg/3 mL (0.083%) inhalation solution: 1 unit(s) inhaled every 4 hours, As Needed (27 Jul 2023 11:12)  Amaryl 1 mg oral tablet: 1 tab(s) orally once a day (27 Jul 2023 00:55)  amiodarone 200 mg oral tablet: 200 milligram(s) orally once a day (27 Jul 2023 11:12)  ascorbic acid 500 mg oral tablet: 1 tab(s) orally once a day (27 Jul 2023 11:12)  aspirin 81 mg oral tablet, chewable: 1 tab(s) orally once a day (27 Jul 2023 11:12)  atorvastatin 80 mg oral tablet: 1 tab(s) orally once a day (27 Jul 2023 11:12)  Atrovent 18 mcg/inh inhalation aerosol: 1 unit(s) inhaled every 6 hours (27 Jul 2023 11:12)  bumetanide 2 mg oral tablet: 1 tab(s) orally once a day Hold if SBP &lt; 100 or for symptomatic hypotension (27 Jul 2023 11:12)  chlorhexidine 0.12% mucous membrane liquid: 15 milliliter(s) buccal 2 times a day (27 Jul 2023 11:12)  doxazosin 4 mg oral tablet: 1 tab(s) orally once a day (at bedtime) (27 Jul 2023 11:12)  ergocalciferol 200 mcg/mL (8000 intl units/mL) oral solution: 0.25 milliliter(s) orally once a day (27 Jul 2023 11:12)  ferrous sulfate 220 mg/5 mL (44 mg/5 mL elemental iron) oral elixir: 7.5 milliliter(s) by gastrostomy tube once a day (27 Jul 2023 11:12)  folic acid 1 mg oral tablet: 1 tab(s) orally once a day (27 Jul 2023 11:12)  Keppra 100 mg/mL oral solution: 5 milliliter(s) by gastrostomy tube 2 times a day (27 Jul 2023 11:12)  Multiple Vitamins oral tablet: 1 tab(s) orally once a day via PEG (27 Jul 2023 11:12)  pantoprazole 40 mg oral delayed release tablet: 1 tab(s) orally 2 times a day (27 Jul 2023 11:12)  polyethylene glycol 3350 oral powder for reconstitution: 17 gram(s) orally once a day (27 Jul 2023 11:07)  Retacrit 40,000 units/mL preservative-free injectable solution: 40,000 unit(s) intravenously 2 times a week monday, thursday (27 Jul 2023 11:06)  SSD 1% topical cream: Apply topically to affected area every 12 hours (27 Jul 2023 11:12)  Venofer 20 mg/mL intravenous solution: 100 milligram(s) intravenously once a week on wednesday (27 Jul 2023 11:11)  zinc sulfate 220 mg oral tablet: 1 tab(s) orally once a day (27 Jul 2023 11:12)    MEDICATIONS  (STANDING):  aMIOdarone    Tablet 200 milliGRAM(s) Oral daily  ascorbic acid 500 milliGRAM(s) Oral daily  atorvastatin 80 milliGRAM(s) Oral at bedtime  chlorhexidine 0.12% Liquid 15 milliLiter(s) Oral Mucosa every 12 hours  chlorhexidine 2% Cloths 1 Application(s) Topical <User Schedule>  collagenase Ointment 1 Application(s) Topical once  collagenase Ointment 1 Application(s) Topical two times a day  Dakins Solution - 1/2 Strength 1 Application(s) Topical every 12 hours  dextrose 5%. 1000 milliLiter(s) (50 mL/Hr) IV Continuous <Continuous>  dextrose 5%. 1000 milliLiter(s) (100 mL/Hr) IV Continuous <Continuous>  dextrose 50% Injectable 25 Gram(s) IV Push once  dextrose 50% Injectable 12.5 Gram(s) IV Push once  dextrose 50% Injectable 25 Gram(s) IV Push once  doxazosin 4 milliGRAM(s) Oral at bedtime  epoetin guanakito-epbx (RETACRIT) Injectable 95110 Unit(s) SubCutaneous every 7 days  ergocalciferol Drops 4000 Unit(s) Oral every 24 hours  ferrous    sulfate Liquid 300 milliGRAM(s) Enteral Tube daily  folic acid 1 milliGRAM(s) Oral daily  glucagon  Injectable 1 milliGRAM(s) IV Push once  glucagon  Injectable 1 milliGRAM(s) IntraMuscular once  insulin lispro (ADMELOG) corrective regimen sliding scale   SubCutaneous at bedtime  lacosamide IVPB 100 milliGRAM(s) IV Intermittent every 12 hours  midodrine 10 milliGRAM(s) Oral every 8 hours  multivitamin 1 Tablet(s) Oral daily  norepinephrine Infusion 0.05 MICROgram(s)/kG/Min (8.78 mL/Hr) IV Continuous <Continuous>  pantoprazole  Injectable 40 milliGRAM(s) IV Push two times a day  polyethylene glycol 3350 17 Gram(s) Oral daily  silver sulfADIAZINE 1% Cream 1 Application(s) Topical two times a day  valproate sodium  IVPB 750 milliGRAM(s) IV Intermittent every 8 hours    MEDICATIONS  (PRN):  acetaminophen     Tablet .. 650 milliGRAM(s) Oral every 6 hours PRN Temp greater or equal to 38C (100.4F), Mild Pain (1 - 3)  aluminum hydroxide/magnesium hydroxide/simethicone Suspension 30 milliLiter(s) Oral every 4 hours PRN Dyspepsia  dextrose Oral Gel 15 Gram(s) Oral once PRN Blood Glucose LESS THAN 70 milliGRAM(s)/deciliter  melatonin 3 milliGRAM(s) Oral at bedtime PRN Insomnia         GEN: NAD, pleasant, cooperative    NEUROLOGICAL EXAMINATION:  GENERAL:  Critically ill, on Mechanical vent (trach to vent), no any response to verbal or noxious stimuli, semi-rhytmic jerking of his L arm in dystonic posture.   Pupils: almost non-reactive maybe some flickering on direct light, eyes with horisontal jerking movments, no corneal b/l. General tone of muscle - with tonic on L side, no movements, no signs of wiwthrewal. No oculocephalic, no gag reflexis consistent with chronologic age. DTR abs throughout.        LABS:                        7.8    14.64 )-----------( 310      ( 31 Jul 2023 05:54 )             24.3     07-31    135  |  97<L>  |  82<HH>  ----------------------------<  90  3.9   |  22  |  3.5<H>    Ca    8.3<L>      31 Jul 2023 05:54  Phos  3.4     07-30  Mg     2.6     07-31    TPro  5.2<L>  /  Alb  2.1<L>  /  TBili  0.3  /  DBili  x   /  AST  20  /  ALT  10  /  AlkPhos  138<H>  07-31    Hemoglobin A1C:   Vitamin B12     CAPILLARY BLOOD GLUCOSE      POCT Blood Glucose.: 105 mg/dL (31 Jul 2023 05:28)      Urinalysis Basic - ( 31 Jul 2023 05:54 )    Color: x / Appearance: x / SG: x / pH: x  Gluc: 90 mg/dL / Ketone: x  / Bili: x / Urobili: x   Blood: x / Protein: x / Nitrite: x   Leuk Esterase: x / RBC: x / WBC x   Sq Epi: x / Non Sq Epi: x / Bacteria: x        ABG - ( 30 Jul 2023 03:22 )  pH, Arterial: 7.32  pH, Blood: x     /  pCO2: 52    /  pO2: 103   / HCO3: 27    / Base Excess: -0.1  /  SaO2: 99.2                Microbiology:      RADIOLOGY, EKG AND ADDITIONAL TESTS: Reviewed.

## 2023-07-31 NOTE — PROGRESS NOTE ADULT - ASSESSMENT
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 chronic non-verbal , quadriplegia    multiple sacral wounds and buttock    PLAN  check bmp/phos/mg and correct lytes  change g-tube feed to peptamen AF at 375ml over 45 min x 4feeds/d  that is 1800kcal/114gm of protein - whey source, relatively lower % carb and lower K  check bmp/phos/mg and correct lytes

## 2023-07-31 NOTE — PROGRESS NOTE ADULT - SUBJECTIVE AND OBJECTIVE BOX
Over Night Events: events noted, vent dependant, transferred from MICU, Afebrile, sp Head CT, neuro noted, on  VEEG    PHYSICAL EXAM    ICU Vital Signs Last 24 Hrs  T(C): 35.7 (31 Jul 2023 08:09), Max: 36.8 (31 Jul 2023 00:20)  T(F): 96.2 (31 Jul 2023 08:09), Max: 98.2 (31 Jul 2023 00:20)  HR: 82 (31 Jul 2023 08:09) (79 - 92)  BP: 102/52 (31 Jul 2023 08:09) (84/46 - 119/64)  BP(mean): 78 (30 Jul 2023 21:00) (60 - 85)  RR: 16 (31 Jul 2023 08:09) (16 - 24)  SpO2: 100% (31 Jul 2023 08:09) (87% - 100%)    O2 Parameters below as of 30 Jul 2023 20:00  Patient On (Oxygen Delivery Method): ventilator            General: ILL looking  HEENT: trach  Lungs: Bilateral BS  Cardiovascular: Regular   Abdomen: Soft, Positive BS  unresponsive  sacral ulcer    07-30-23 @ 07:01  -  07-31-23 @ 07:00  --------------------------------------------------------  IN:    Enteral Tube Flush: 90 mL    IV PiggyBack: 100 mL    Norepinephrine: 12.3 mL    Norepinephrine: 59.8 mL    Vital High Protein: 960 mL  Total IN: 1222.1 mL    OUT:    Incontinent per Collection Bag (mL): 130 mL    Rectal Tube (mL): 150 mL  Total OUT: 280 mL    Total NET: 942.1 mL          LABS:                          7.8    14.64 )-----------( 310      ( 31 Jul 2023 05:54 )             24.3                                               07-31    135  |  97<L>  |  82<HH>  ----------------------------<  90  3.9   |  22  |  3.5<H>    Ca    8.3<L>      31 Jul 2023 05:54  Phos  3.4     07-30  Mg     2.6     07-31    TPro  5.2<L>  /  Alb  2.1<L>  /  TBili  0.3  /  DBili  x   /  AST  20  /  ALT  10  /  AlkPhos  138<H>  07-31                                             Urinalysis Basic - ( 31 Jul 2023 05:54 )    Color: x / Appearance: x / SG: x / pH: x  Gluc: 90 mg/dL / Ketone: x  / Bili: x / Urobili: x   Blood: x / Protein: x / Nitrite: x   Leuk Esterase: x / RBC: x / WBC x   Sq Epi: x / Non Sq Epi: x / Bacteria: x                                                  LIVER FUNCTIONS - ( 31 Jul 2023 05:54 )  Alb: 2.1 g/dL / Pro: 5.2 g/dL / ALK PHOS: 138 U/L / ALT: 10 U/L / AST: 20 U/L / GGT: x                                                                                               Mode: AC/ CMV (Assist Control/ Continuous Mandatory Ventilation)  RR (machine): 18  TV (machine): 450  FiO2: 40  PEEP: 8  ITime: 1  MAP: 19  PIP: 21                                      ABG - ( 30 Jul 2023 03:22 )  pH, Arterial: 7.32  pH, Blood: x     /  pCO2: 52    /  pO2: 103   / HCO3: 27    / Base Excess: -0.1  /  SaO2: 99.2                MEDICATIONS  (STANDING):  aMIOdarone    Tablet 200 milliGRAM(s) Oral daily  ascorbic acid 500 milliGRAM(s) Oral daily  atorvastatin 80 milliGRAM(s) Oral at bedtime  chlorhexidine 0.12% Liquid 15 milliLiter(s) Oral Mucosa every 12 hours  chlorhexidine 2% Cloths 1 Application(s) Topical <User Schedule>  collagenase Ointment 1 Application(s) Topical two times a day  collagenase Ointment 1 Application(s) Topical once  Dakins Solution - 1/2 Strength 1 Application(s) Topical every 12 hours  dextrose 5%. 1000 milliLiter(s) (50 mL/Hr) IV Continuous <Continuous>  dextrose 5%. 1000 milliLiter(s) (100 mL/Hr) IV Continuous <Continuous>  dextrose 50% Injectable 25 Gram(s) IV Push once  dextrose 50% Injectable 12.5 Gram(s) IV Push once  dextrose 50% Injectable 25 Gram(s) IV Push once  doxazosin 4 milliGRAM(s) Oral at bedtime  epoetin guanakito-epbx (RETACRIT) Injectable 37360 Unit(s) SubCutaneous every 7 days  ergocalciferol Drops 4000 Unit(s) Oral every 24 hours  ferrous    sulfate Liquid 300 milliGRAM(s) Enteral Tube daily  folic acid 1 milliGRAM(s) Oral daily  glucagon  Injectable 1 milliGRAM(s) IV Push once  glucagon  Injectable 1 milliGRAM(s) IntraMuscular once  insulin lispro (ADMELOG) corrective regimen sliding scale   SubCutaneous at bedtime  lacosamide IVPB 100 milliGRAM(s) IV Intermittent every 12 hours  midodrine 10 milliGRAM(s) Oral every 8 hours  multivitamin 1 Tablet(s) Oral daily  norepinephrine Infusion 0.05 MICROgram(s)/kG/Min (8.78 mL/Hr) IV Continuous <Continuous>  pantoprazole  Injectable 40 milliGRAM(s) IV Push two times a day  polyethylene glycol 3350 17 Gram(s) Oral daily  silver sulfADIAZINE 1% Cream 1 Application(s) Topical two times a day  valproate sodium  IVPB 750 milliGRAM(s) IV Intermittent every 8 hours    MEDICATIONS  (PRN):  acetaminophen     Tablet .. 650 milliGRAM(s) Oral every 6 hours PRN Temp greater or equal to 38C (100.4F), Mild Pain (1 - 3)  aluminum hydroxide/magnesium hydroxide/simethicone Suspension 30 milliLiter(s) Oral every 4 hours PRN Dyspepsia  dextrose Oral Gel 15 Gram(s) Oral once PRN Blood Glucose LESS THAN 70 milliGRAM(s)/deciliter  melatonin 3 milliGRAM(s) Oral at bedtime PRN Insomnia

## 2023-08-01 NOTE — EEG REPORT - NS EEG TEXT BOX
Epilepsy Attending Note:     ALICIA BARBOUR    63y Male  MRN [id number]    Vital Signs Last 24 Hrs  T(C): 36.3 (01 Aug 2023 07:51), Max: 36.3 (01 Aug 2023 07:51)  T(F): 97.4 (01 Aug 2023 07:51), Max: 97.4 (01 Aug 2023 07:51)  HR: 88 (01 Aug 2023 08:32) (80 - 98)  BP: 110/54 (01 Aug 2023 07:51) (108/58 - 134/61)  BP(mean): 77 (01 Aug 2023 07:51) (77 - 80)  RR: 20 (01 Aug 2023 07:51) (16 - 20)  SpO2: 97% (01 Aug 2023 08:32) (97% - 100%)    Parameters below as of 01 Aug 2023 07:51  Patient On (Oxygen Delivery Method): ventilator                              8.2    10.56 )-----------( 209      ( 01 Aug 2023 04:30 )             26.1       08    136  |  99  |  53<H>  ----------------------------<  127<H>  3.8   |  22  |  2.6<H>    Ca    8.5      01 Aug 2023 04:30  Phos  3.0     08  Mg     2.3         TPro  5.5<L>  /  Alb  2.2<L>  /  TBili  0.3  /  DBili  x   /  AST  20  /  ALT  9   /  AlkPhos  143<H>        MEDICATIONS  (STANDING):  aMIOdarone    Tablet 200 milliGRAM(s) Oral daily  ascorbic acid 500 milliGRAM(s) Oral daily  atorvastatin 80 milliGRAM(s) Oral at bedtime  chlorhexidine 0.12% Liquid 15 milliLiter(s) Oral Mucosa every 12 hours  chlorhexidine 2% Cloths 1 Application(s) Topical <User Schedule>  collagenase Ointment 1 Application(s) Topical two times a day  collagenase Ointment 1 Application(s) Topical once  Dakins Solution - 1/2 Strength 1 Application(s) Topical every 12 hours  dextrose 5%. 1000 milliLiter(s) (50 mL/Hr) IV Continuous <Continuous>  dextrose 5%. 1000 milliLiter(s) (100 mL/Hr) IV Continuous <Continuous>  dextrose 50% Injectable 25 Gram(s) IV Push once  dextrose 50% Injectable 12.5 Gram(s) IV Push once  dextrose 50% Injectable 25 Gram(s) IV Push once  doxazosin 4 milliGRAM(s) Oral at bedtime  epoetin guanakito-epbx (RETACRIT) Injectable 31214 Unit(s) SubCutaneous every 7 days  ergocalciferol Drops 4000 Unit(s) Oral every 24 hours  ferrous    sulfate Liquid 300 milliGRAM(s) Enteral Tube daily  folic acid 1 milliGRAM(s) Oral daily  glucagon  Injectable 1 milliGRAM(s) IV Push once  glucagon  Injectable 1 milliGRAM(s) IntraMuscular once  insulin lispro (ADMELOG) corrective regimen sliding scale   SubCutaneous at bedtime  lacosamide IVPB 100 milliGRAM(s) IV Intermittent every 12 hours  metoclopramide  IVPB 5 milliGRAM(s) IV Intermittent every 8 hours  midodrine 10 milliGRAM(s) Oral every 8 hours  multivitamin 1 Tablet(s) Oral daily  norepinephrine Infusion 0.05 MICROgram(s)/kG/Min (8.78 mL/Hr) IV Continuous <Continuous>  pantoprazole  Injectable 40 milliGRAM(s) IV Push two times a day  polyethylene glycol 3350 17 Gram(s) Oral two times a day  senna 2 Tablet(s) Oral at bedtime  silver sulfADIAZINE 1% Cream 1 Application(s) Topical two times a day  valproate sodium  IVPB 2000 milliGRAM(s) IV Intermittent once  valproate sodium  IVPB 750 milliGRAM(s) IV Intermittent every 8 hours    MEDICATIONS  (PRN):  acetaminophen     Tablet .. 650 milliGRAM(s) Oral every 6 hours PRN Temp greater or equal to 38C (100.4F), Mild Pain (1 - 3)  aluminum hydroxide/magnesium hydroxide/simethicone Suspension 30 milliLiter(s) Oral every 4 hours PRN Dyspepsia  dextrose Oral Gel 15 Gram(s) Oral once PRN Blood Glucose LESS THAN 70 milliGRAM(s)/deciliter  LORazepam   Injectable 2 milliGRAM(s) IV Push daily PRN seizure  melatonin 3 milliGRAM(s) Oral at bedtime PRN Insomnia      Valproic Acid Level, Serum: 32.0 ug/mL [50.0 - 100.0] (23 @ 04:30)  Valproic Acid Level, Serum: 22.0 ug/mL [50.0 - 100.0] (23 @ 19:53)        VEEG in the last 24 hours:    Background - asymmetrical with higher amplitude and breach artifact from the right side, lower amplitude and mildly suppressed background over the left hemisphere, reaching frequencies in the range of 4-5 Hz with more reactivity towards latter part of the recording.     Focal and generalized slowin. moderate to severe generalized slowing  2. moderate right hemispheric focal slowing  3. independent left hemispheric focal slowing    Interictal activity - very frequent right hemispheric sharp waves and spikes, that for the most part appear in periodic pattern    Events - none    Seizures - none    Impression: Abnormal VEEG as above    Plan - per neurology team

## 2023-08-01 NOTE — PROGRESS NOTE ADULT - SUBJECTIVE AND OBJECTIVE BOX
ALICIA BARBOUR 63y Male  MRN#: [id number]     Hospital Day: 6d    Pt is currently admitted with the primary diagnosis of     Overnight events   -No major overnight events                                          ----------------------------------------------------------  OBJECTIVE  PAST MEDICAL & SURGICAL HISTORY  HTN (hypertension)    Diabetes mellitus    H/O intracranial hemorrhage    H/O tracheostomy    PEG (percutaneous endoscopic gastrostomy) status    Hyperlipidemia                                              -----------------------------------------------------------  MEDICATIONS:  STANDING MEDICATIONS  aMIOdarone    Tablet 200 milliGRAM(s) Oral daily  ascorbic acid 500 milliGRAM(s) Oral daily  atorvastatin 80 milliGRAM(s) Oral at bedtime  chlorhexidine 0.12% Liquid 15 milliLiter(s) Oral Mucosa every 12 hours  chlorhexidine 2% Cloths 1 Application(s) Topical <User Schedule>  clonazePAM  Tablet 1 milliGRAM(s) Oral three times a day  collagenase Ointment 1 Application(s) Topical two times a day  collagenase Ointment 1 Application(s) Topical once  Dakins Solution - 1/2 Strength 1 Application(s) Topical every 12 hours  dextrose 5%. 1000 milliLiter(s) IV Continuous <Continuous>  dextrose 5%. 1000 milliLiter(s) IV Continuous <Continuous>  dextrose 50% Injectable 25 Gram(s) IV Push once  dextrose 50% Injectable 12.5 Gram(s) IV Push once  dextrose 50% Injectable 25 Gram(s) IV Push once  doxazosin 4 milliGRAM(s) Oral at bedtime  epoetin guanakito-epbx (RETACRIT) Injectable 36174 Unit(s) SubCutaneous every 7 days  ergocalciferol Drops 4000 Unit(s) Oral every 24 hours  ferrous    sulfate Liquid 300 milliGRAM(s) Enteral Tube daily  folic acid 1 milliGRAM(s) Oral daily  glucagon  Injectable 1 milliGRAM(s) IV Push once  glucagon  Injectable 1 milliGRAM(s) IntraMuscular once  insulin lispro (ADMELOG) corrective regimen sliding scale   SubCutaneous at bedtime  lacosamide IVPB 100 milliGRAM(s) IV Intermittent every 12 hours  metoclopramide Injectable 5 milliGRAM(s) IV Push three times a day  midodrine 10 milliGRAM(s) Oral every 8 hours  multivitamin 1 Tablet(s) Oral daily  pantoprazole  Injectable 40 milliGRAM(s) IV Push two times a day  polyethylene glycol 3350 17 Gram(s) Oral two times a day  senna 2 Tablet(s) Oral at bedtime  silver sulfADIAZINE 1% Cream 1 Application(s) Topical two times a day  valproate sodium  IVPB 750 milliGRAM(s) IV Intermittent every 8 hours    PRN MEDICATIONS  acetaminophen     Tablet .. 650 milliGRAM(s) Oral every 6 hours PRN  aluminum hydroxide/magnesium hydroxide/simethicone Suspension 30 milliLiter(s) Oral every 4 hours PRN  dextrose Oral Gel 15 Gram(s) Oral once PRN  LORazepam   Injectable 2 milliGRAM(s) IV Push daily PRN  melatonin 3 milliGRAM(s) Oral at bedtime PRN                                            ------------------------------------------------------------  VITAL SIGNS: Last 24 Hours  T(C): 36.2 (01 Aug 2023 11:33), Max: 36.3 (01 Aug 2023 07:51)  T(F): 97.1 (01 Aug 2023 11:33), Max: 97.4 (01 Aug 2023 07:51)  HR: 93 (01 Aug 2023 15:15) (80 - 98)  BP: 114/57 (01 Aug 2023 11:33) (108/58 - 134/61)  BP(mean): 79 (01 Aug 2023 11:33) (77 - 80)  RR: 20 (01 Aug 2023 11:33) (16 - 20)  SpO2: 100% (01 Aug 2023 15:15) (97% - 100%)      07-31-23 @ 07:01  -  08-01-23 @ 07:00  --------------------------------------------------------  IN: 950 mL / OUT: 1115 mL / NET: -165 mL    08-01-23 @ 07:01  -  08-01-23 @ 17:12  --------------------------------------------------------  IN: 0 mL / OUT: 100 mL / NET: -100 mL                                             --------------------------------------------------------------  LABS:                        8.2    10.56 )-----------( 209      ( 01 Aug 2023 04:30 )             26.1     08-01    136  |  99  |  53<H>  ----------------------------<  127<H>  3.8   |  22  |  2.6<H>    Ca    8.5      01 Aug 2023 04:30  Phos  3.0     08-01  Mg     2.3     08-01    TPro  5.5<L>  /  Alb  2.2<L>  /  TBili  0.3  /  DBili  x   /  AST  20  /  ALT  9   /  AlkPhos  143<H>  08-01      Urinalysis Basic - ( 01 Aug 2023 04:30 )    Color: x / Appearance: x / SG: x / pH: x  Gluc: 127 mg/dL / Ketone: x  / Bili: x / Urobili: x   Blood: x / Protein: x / Nitrite: x   Leuk Esterase: x / RBC: x / WBC x   Sq Epi: x / Non Sq Epi: x / Bacteria: x                                                            --------------------------------------------------------------  PHYSICAL EXAM:  GENERAL: Asleep, NAD  HEENT: trach, no secretions  LUNGS: apicals clear  HEART: S1/S2  ABD: Soft  SKIN: swollen arms and legs    PLAN:    # DVT prophylaxis     # GI prophylaxis     # Diet     # Activity Score (AM-PAC)    #Progress Note Handoff  Pending (specify):  Family discussion:   Disposition:

## 2023-08-01 NOTE — PROGRESS NOTE ADULT - ASSESSMENT
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        ESRD on HD  - Biweekly schedule on Monday/ Thursday   HD yesterday  - On levophed, BP on the low side  - Fluid overload, on ventilator low Fio2  - electrolytes reviewed / hyponatremia better     Anemia of ESRD   - Hbg 78. On RETACRIT and venofer / transfuse to keep Hb > 7    MBD  IP at at [facility]     will follow / Prognosis guarded

## 2023-08-01 NOTE — PROGRESS NOTE ADULT - SUBJECTIVE AND OBJECTIVE BOX
Neurology Progress Note    Interval History:    Patient was seen and examined, VEEG w generalized slowing Interictal activity - very frequent right hemispheric sharp waves and spikes, that for the most part appear in periodic pattern. Depakote load giving now    Medications:  acetaminophen     Tablet .. 650 milliGRAM(s) Oral every 6 hours PRN  aluminum hydroxide/magnesium hydroxide/simethicone Suspension 30 milliLiter(s) Oral every 4 hours PRN  aMIOdarone    Tablet 200 milliGRAM(s) Oral daily  ascorbic acid 500 milliGRAM(s) Oral daily  atorvastatin 80 milliGRAM(s) Oral at bedtime  chlorhexidine 0.12% Liquid 15 milliLiter(s) Oral Mucosa every 12 hours  chlorhexidine 2% Cloths 1 Application(s) Topical <User Schedule>  collagenase Ointment 1 Application(s) Topical once  collagenase Ointment 1 Application(s) Topical two times a day  Dakins Solution - 1/2 Strength 1 Application(s) Topical every 12 hours  dextrose 5%. 1000 milliLiter(s) IV Continuous <Continuous>  dextrose 5%. 1000 milliLiter(s) IV Continuous <Continuous>  dextrose 50% Injectable 12.5 Gram(s) IV Push once  dextrose 50% Injectable 25 Gram(s) IV Push once  dextrose 50% Injectable 25 Gram(s) IV Push once  dextrose Oral Gel 15 Gram(s) Oral once PRN  doxazosin 4 milliGRAM(s) Oral at bedtime  epoetin guanakito-epbx (RETACRIT) Injectable 98116 Unit(s) SubCutaneous every 7 days  ergocalciferol Drops 4000 Unit(s) Oral every 24 hours  ferrous    sulfate Liquid 300 milliGRAM(s) Enteral Tube daily  folic acid 1 milliGRAM(s) Oral daily  glucagon  Injectable 1 milliGRAM(s) IV Push once  glucagon  Injectable 1 milliGRAM(s) IntraMuscular once  insulin lispro (ADMELOG) corrective regimen sliding scale   SubCutaneous at bedtime  lacosamide IVPB 100 milliGRAM(s) IV Intermittent every 12 hours  LORazepam   Injectable 2 milliGRAM(s) IV Push daily PRN  melatonin 3 milliGRAM(s) Oral at bedtime PRN  metoclopramide Injectable 5 milliGRAM(s) IV Push three times a day  midodrine 10 milliGRAM(s) Oral every 8 hours  multivitamin 1 Tablet(s) Oral daily  norepinephrine Infusion 0.05 MICROgram(s)/kG/Min IV Continuous <Continuous>  pantoprazole  Injectable 40 milliGRAM(s) IV Push two times a day  polyethylene glycol 3350 17 Gram(s) Oral two times a day  senna 2 Tablet(s) Oral at bedtime  silver sulfADIAZINE 1% Cream 1 Application(s) Topical two times a day  valproate sodium  IVPB 750 milliGRAM(s) IV Intermittent every 8 hours      Vital Signs Last 24 Hrs  T(C): 36.2 (01 Aug 2023 11:33), Max: 36.3 (01 Aug 2023 07:51)  T(F): 97.1 (01 Aug 2023 11:33), Max: 97.4 (01 Aug 2023 07:51)  HR: 91 (01 Aug 2023 11:33) (80 - 98)  BP: 114/57 (01 Aug 2023 11:33) (108/58 - 134/61)  BP(mean): 79 (01 Aug 2023 11:33) (77 - 80)  RR: 20 (01 Aug 2023 11:33) (16 - 20)  SpO2: 98% (01 Aug 2023 11:33) (97% - 100%)    Parameters below as of 01 Aug 2023 07:51  Patient On (Oxygen Delivery Method): ventilator      NEUROLOGICAL EXAMINATION:  GENERAL:  Critically ill, on Mechanical vent (trach to vent), no any response to verbal or noxious stimuli, semi-rhytmic jerking of his L arm in dystonic posture.   Pupils: almost non-reactive maybe some flickering on direct light, eyes with horisontal jerking movments, no corneal b/l. General tone of muscle - with tonic on L side, no movements, no signs of wiwthrewal. No oculocephalic, no gag reflexis consistent with chronologic age. DTR abs throughout.       Labs:  CBC Full  -  ( 01 Aug 2023 04:30 )  WBC Count : 10.56 K/uL  RBC Count : 2.75 M/uL  Hemoglobin : 8.2 g/dL  Hematocrit : 26.1 %  Platelet Count - Automated : 209 K/uL  Mean Cell Volume : 94.9 fL  Mean Cell Hemoglobin : 29.8 pg  Mean Cell Hemoglobin Concentration : 31.4 g/dL       08-01    136  |  99  |  53<H>  ----------------------------<  127<H>  3.8   |  22  |  2.6<H>    Ca    8.5      01 Aug 2023 04:30  Phos  3.0     08-01  Mg     2.3     08-01    TPro  5.5<L>  /  Alb  2.2<L>  /  TBili  0.3  /  DBili  x   /  AST  20  /  ALT  9   /  AlkPhos  143<H>  08-01    LIVER FUNCTIONS - ( 01 Aug 2023 04:30 )  Alb: 2.2 g/dL / Pro: 5.5 g/dL / ALK PHOS: 143 U/L / ALT: 9 U/L / AST: 20 U/L / GGT: x             Urinalysis Basic - ( 01 Aug 2023 04:30 )    Color: x / Appearance: x / SG: x / pH: x  Gluc: 127 mg/dL / Ketone: x  / Bili: x / Urobili: x   Blood: x / Protein: x / Nitrite: x   Leuk Esterase: x / RBC: x / WBC x   Sq Epi: x / Non Sq Epi: x / Bacteria: x        RADIOLOGY & ADDITIONAL TESTS:

## 2023-08-01 NOTE — PROGRESS NOTE ADULT - SUBJECTIVE AND OBJECTIVE BOX
Nephrology progress note    Patient was seen and examined, events over the last 24 h noted .    HD yesterday    Allergies:  penicillin (Other)    Hospital Medications:   MEDICATIONS  (STANDING):  aMIOdarone    Tablet 200 milliGRAM(s) Oral daily  ascorbic acid 500 milliGRAM(s) Oral daily  atorvastatin 80 milliGRAM(s) Oral at bedtime  chlorhexidine 0.12% Liquid 15 milliLiter(s) Oral Mucosa every 12 hours  chlorhexidine 2% Cloths 1 Application(s) Topical <User Schedule>  collagenase Ointment 1 Application(s) Topical two times a day  collagenase Ointment 1 Application(s) Topical once  Dakins Solution - 1/2 Strength 1 Application(s) Topical every 12 hours  dextrose 5%. 1000 milliLiter(s) (50 mL/Hr) IV Continuous <Continuous>  dextrose 5%. 1000 milliLiter(s) (100 mL/Hr) IV Continuous <Continuous>  dextrose 50% Injectable 25 Gram(s) IV Push once  dextrose 50% Injectable 12.5 Gram(s) IV Push once  dextrose 50% Injectable 25 Gram(s) IV Push once  doxazosin 4 milliGRAM(s) Oral at bedtime  epoetin guanakito-epbx (RETACRIT) Injectable 33637 Unit(s) SubCutaneous every 7 days  ergocalciferol Drops 4000 Unit(s) Oral every 24 hours  ferrous    sulfate Liquid 300 milliGRAM(s) Enteral Tube daily  folic acid 1 milliGRAM(s) Oral daily  glucagon  Injectable 1 milliGRAM(s) IV Push once  glucagon  Injectable 1 milliGRAM(s) IntraMuscular once  insulin lispro (ADMELOG) corrective regimen sliding scale   SubCutaneous at bedtime  lacosamide IVPB 100 milliGRAM(s) IV Intermittent every 12 hours  metoclopramide Injectable 5 milliGRAM(s) IV Push three times a day  midodrine 10 milliGRAM(s) Oral every 8 hours  multivitamin 1 Tablet(s) Oral daily  norepinephrine Infusion 0.05 MICROgram(s)/kG/Min (8.78 mL/Hr) IV Continuous <Continuous>  pantoprazole  Injectable 40 milliGRAM(s) IV Push two times a day  polyethylene glycol 3350 17 Gram(s) Oral two times a day  senna 2 Tablet(s) Oral at bedtime  silver sulfADIAZINE 1% Cream 1 Application(s) Topical two times a day  valproate sodium  IVPB 750 milliGRAM(s) IV Intermittent every 8 hours        VITALS:  T(F): 97.1 (08-01-23 @ 11:33), Max: 97.4 (08-01-23 @ 07:51)  HR: 91 (08-01-23 @ 11:33)  BP: 114/57 (08-01-23 @ 11:33)  RR: 20 (08-01-23 @ 11:33)  SpO2: 98% (08-01-23 @ 11:33)  Wt(kg): --    07-30 @ 07:01  -  07-31 @ 07:00  --------------------------------------------------------  IN: 1222.1 mL / OUT: 280 mL / NET: 942.1 mL    07-31 @ 07:01  -  08-01 @ 07:00  --------------------------------------------------------  IN: 950 mL / OUT: 1115 mL / NET: -165 mL          PHYSICAL EXAM:  Constitutional: NAD/ trached   Respiratory: CTAB  Cardiovascular: S1, S2, RRR  Gastrointestinal: BS+, soft, NT/ND  Extremities: No cyanosis or clubbing. No peripheral edema  :  No teixeira.   Skin: No rashes    LABS:  08-01    136  |  99  |  53<H>  ----------------------------<  127<H>  3.8   |  22  |  2.6<H>    Ca    8.5      01 Aug 2023 04:30  Phos  3.0     08-01  Mg     2.3     08-01    TPro  5.5<L>  /  Alb  2.2<L>  /  TBili  0.3  /  DBili      /  AST  20  /  ALT  9   /  AlkPhos  143<H>  08-01                          8.2    10.56 )-----------( 209      ( 01 Aug 2023 04:30 )             26.1       Urine Studies:  Urinalysis Basic - ( 01 Aug 2023 04:30 )    Color:  / Appearance:  / SG:  / pH:   Gluc: 127 mg/dL / Ketone:   / Bili:  / Urobili:    Blood:  / Protein:  / Nitrite:    Leuk Esterase:  / RBC:  / WBC    Sq Epi:  / Non Sq Epi:  / Bacteria:         RADIOLOGY & ADDITIONAL STUDIES:

## 2023-08-01 NOTE — PROGRESS NOTE ADULT - ASSESSMENT
63-year-old male with above PMH admitted w SE and VEEG showing R sided PLEDs w signs of generalization on VPA 2250/day and /day, Last VPA level 22 yesterday, clinically - critically ill, on MT and with L sided jerking movements mostly concurrent w R-sided PLEDs on VEEG indicates severe focal cerebral damage and prognosis guarded.  At this point needs seizure control: seems his jerking movements time locked and synchronous w periodic sharps on VEEG, will re-assess VPA 2 g. under VEEG monitoring and planning add benzodiazepine - Clonazepam.    Recommendations:     - c/w VEEG  - 2 g Valproate bolus  - planning add Clonazepam 1 mg  - check AM Valproate trough  - c/w Vimpat 100 mg bid  - rest of tx per primary team  - neurology will follow       The case was discussed w Dr. Santos    63-year-old male with above PMH admitted w SE and VEEG showing R sided PLEDs w signs of generalization on VPA 2250/day and /day, Last VPA level 22 yesterday, clinically - critically ill, on MT and with L sided jerking movements mostly concurrent w R-sided PLEDs on VEEG indicates severe focal cerebral damage and prognosis guarded.  At this point needs seizure control: seems his jerking movements time locked and synchronous w periodic sharps on VEEG, w/o improvement after VPA 2 g. under VEEG monitoring and planning add - Clonazepam.    Recommendations:     - c/w VEEG  - s/p 2 g Valproate bolus  - check Valproate trough  - Start Clonazepam 1 mg tid  - c/w Vimpat 100 mg bid  - rest of tx per primary team  - neurology will follow       The case was discussed w Dr. Santos

## 2023-08-01 NOTE — PROGRESS NOTE ADULT - SUBJECTIVE AND OBJECTIVE BOX
Over Night Events: events noted, vent dependant, neurosx noted, still on VEEG    PHYSICAL EXAM    ICU Vital Signs Last 24 Hrs  T(C): 36.3 (01 Aug 2023 07:51), Max: 36.3 (01 Aug 2023 07:51)  T(F): 97.4 (01 Aug 2023 07:51), Max: 97.4 (01 Aug 2023 07:51)  HR: 90 (01 Aug 2023 07:51) (80 - 98)  BP: 110/54 (01 Aug 2023 07:51) (102/52 - 134/61)  BP(mean): 77 (01 Aug 2023 07:51) (77 - 80)  RR: 20 (01 Aug 2023 07:51) (16 - 20)  SpO2: 98% (01 Aug 2023 07:51) (98% - 100%)    O2 Parameters below as of 01 Aug 2023 07:51  Patient On (Oxygen Delivery Method): ventilator            General: ILL Looking  HEENT: TRACH  Lungs: dec bs both bases  Cardiovascular: Regular   Abdomen: Soft, Positive BS  unresponsive  sacral ulcer      07-31-23 @ 07:01  -  08-01-23 @ 07:00  --------------------------------------------------------  IN:    Enteral Tube Flush: 200 mL    Peptamen A.F.: 750 mL  Total IN: 950 mL    OUT:    Incontinent per Collection Bag (mL): 75 mL    Other (mL): 1000 mL    Rectal Tube (mL): 40 mL  Total OUT: 1115 mL    Total NET: -165 mL          LABS:                          7.8    14.64 )-----------( 310      ( 31 Jul 2023 05:54 )             24.3                                               07-31    135  |  97<L>  |  82<HH>  ----------------------------<  90  3.9   |  22  |  3.5<H>    Ca    8.3<L>      31 Jul 2023 05:54  Mg     2.6     07-31    TPro  5.2<L>  /  Alb  2.1<L>  /  TBili  0.3  /  DBili  x   /  AST  20  /  ALT  10  /  AlkPhos  138<H>  07-31                                             Urinalysis Basic - ( 31 Jul 2023 05:54 )    Color: x / Appearance: x / SG: x / pH: x  Gluc: 90 mg/dL / Ketone: x  / Bili: x / Urobili: x   Blood: x / Protein: x / Nitrite: x   Leuk Esterase: x / RBC: x / WBC x   Sq Epi: x / Non Sq Epi: x / Bacteria: x                                                  LIVER FUNCTIONS - ( 31 Jul 2023 05:54 )  Alb: 2.1 g/dL / Pro: 5.2 g/dL / ALK PHOS: 138 U/L / ALT: 10 U/L / AST: 20 U/L / GGT: x                                                                                               Mode: AC/ CMV (Assist Control/ Continuous Mandatory Ventilation)  RR (machine): 18  TV (machine): 450  FiO2: 40  PEEP: 8  ITime: 1  MAP: 13  PIP: 27                                          MEDICATIONS  (STANDING):  aMIOdarone    Tablet 200 milliGRAM(s) Oral daily  ascorbic acid 500 milliGRAM(s) Oral daily  atorvastatin 80 milliGRAM(s) Oral at bedtime  chlorhexidine 0.12% Liquid 15 milliLiter(s) Oral Mucosa every 12 hours  chlorhexidine 2% Cloths 1 Application(s) Topical <User Schedule>  collagenase Ointment 1 Application(s) Topical once  collagenase Ointment 1 Application(s) Topical two times a day  Dakins Solution - 1/2 Strength 1 Application(s) Topical every 12 hours  dextrose 5%. 1000 milliLiter(s) (50 mL/Hr) IV Continuous <Continuous>  dextrose 5%. 1000 milliLiter(s) (100 mL/Hr) IV Continuous <Continuous>  dextrose 50% Injectable 25 Gram(s) IV Push once  dextrose 50% Injectable 12.5 Gram(s) IV Push once  dextrose 50% Injectable 25 Gram(s) IV Push once  doxazosin 4 milliGRAM(s) Oral at bedtime  epoetin guanakito-epbx (RETACRIT) Injectable 46370 Unit(s) SubCutaneous every 7 days  ergocalciferol Drops 4000 Unit(s) Oral every 24 hours  ferrous    sulfate Liquid 300 milliGRAM(s) Enteral Tube daily  folic acid 1 milliGRAM(s) Oral daily  glucagon  Injectable 1 milliGRAM(s) IV Push once  glucagon  Injectable 1 milliGRAM(s) IntraMuscular once  insulin lispro (ADMELOG) corrective regimen sliding scale   SubCutaneous at bedtime  lacosamide IVPB 100 milliGRAM(s) IV Intermittent every 12 hours  midodrine 10 milliGRAM(s) Oral every 8 hours  multivitamin 1 Tablet(s) Oral daily  norepinephrine Infusion 0.05 MICROgram(s)/kG/Min (8.78 mL/Hr) IV Continuous <Continuous>  pantoprazole  Injectable 40 milliGRAM(s) IV Push two times a day  polyethylene glycol 3350 17 Gram(s) Oral daily  silver sulfADIAZINE 1% Cream 1 Application(s) Topical two times a day  valproate sodium  IVPB 750 milliGRAM(s) IV Intermittent every 8 hours    MEDICATIONS  (PRN):  acetaminophen     Tablet .. 650 milliGRAM(s) Oral every 6 hours PRN Temp greater or equal to 38C (100.4F), Mild Pain (1 - 3)  aluminum hydroxide/magnesium hydroxide/simethicone Suspension 30 milliLiter(s) Oral every 4 hours PRN Dyspepsia  dextrose Oral Gel 15 Gram(s) Oral once PRN Blood Glucose LESS THAN 70 milliGRAM(s)/deciliter  LORazepam   Injectable 2 milliGRAM(s) IV Push daily PRN seizure  melatonin 3 milliGRAM(s) Oral at bedtime PRN Insomnia    cxr reviewed
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
## 2023-08-01 NOTE — PROGRESS NOTE ADULT - SUBJECTIVE AND OBJECTIVE BOX
ALICIA BARBOUR  63y Male    CHIEF COMPLAINT:    Patient is a 63y old  Male who presents with a chief complaint of Seizures and Anemia (01 Aug 2023 08:02)    INTERVAL HPI/OVERNIGHT EVENTS:    Patient seen and examined. No acute events overnight. Remains on VEEG, overall unchanged     ROS: All other systems are negative.    Vital Signs:    T(F): 97.4 (23 @ 07:51), Max: 97.4 (23 @ 07:51)  HR: 90 (23 @ 07:51) (80 - 98)  BP: 110/54 (23 @ 07:51) (104/53 - 134/61)  RR: 20 (23 @ 07:51) (16 - 20)  SpO2: 98% (23 @ 07:51) (98% - 100%)    2023 07:01  -  01 Aug 2023 07:00  --------------------------------------------------------  IN: 950 mL / OUT: 1115 mL / NET: -165 mL    Daily Weight in k.6 (01 Aug 2023 00:00)    POCT Blood Glucose.: 94 mg/dL (01 Aug 2023 06:04)  POCT Blood Glucose.: 104 mg/dL (2023 21:14)  POCT Blood Glucose.: 102 mg/dL (2023 18:27)    PHYSICAL EXAM:    GENERAL:  NAD chronically ill appearing   SKIN: No rashes or lesions  HEENT: Atraumatic. Normocephalic   NECK: Supple, No JVD.    PULMONARY: decreased breath sounds B/L. No wheezing   CVS: Normal S1, S2. Rate and Rhythm are regular.   ABDOMEN/GI: Soft, Nontender, Nondistended   MSK:  No clubbing or cyanosis   NEUROLOGIC: does not follow commands, LUE intermittent twitching   PSYCH: Alert & oriented x 3, normal affect    Consultant(s) Notes Reviewed:  [x ] YES  [ ] NO  Care Discussed with Consultants/Other Providers [ x] YES  [ ] NO    LABS:                        8.2    10.56 )-----------( 209      ( 01 Aug 2023 04:30 )             26.1     136  |  99  |  53<H>  ----------------------------<  127<H>  3.8   |  22  |  2.6<H>    Ca    8.5      01 Aug 2023 04:30  Phos  3.0     08-  Mg     2.6     07    TPro  5.5<L>  /  Alb  2.2<L>  /  TBili  0.3  /  DBili  x   /  AST  20  /  ALT  9   /  AlkPhos  143<H>      RADIOLOGY & ADDITIONAL TESTS:  Imaging or report Personally Reviewed:  [x] YES  [ ] NO  EKG reviewed: [x] YES  [ ] NO    Medications:  Standing  aMIOdarone    Tablet 200 milliGRAM(s) Oral daily  ascorbic acid 500 milliGRAM(s) Oral daily  atorvastatin 80 milliGRAM(s) Oral at bedtime  chlorhexidine 0.12% Liquid 15 milliLiter(s) Oral Mucosa every 12 hours  chlorhexidine 2% Cloths 1 Application(s) Topical <User Schedule>  collagenase Ointment 1 Application(s) Topical two times a day  collagenase Ointment 1 Application(s) Topical once  Dakins Solution - 1/2 Strength 1 Application(s) Topical every 12 hours  dextrose 5%. 1000 milliLiter(s) IV Continuous <Continuous>  dextrose 5%. 1000 milliLiter(s) IV Continuous <Continuous>  dextrose 50% Injectable 12.5 Gram(s) IV Push once  dextrose 50% Injectable 25 Gram(s) IV Push once  dextrose 50% Injectable 25 Gram(s) IV Push once  doxazosin 4 milliGRAM(s) Oral at bedtime  epoetin guanakito-epbx (RETACRIT) Injectable 38179 Unit(s) SubCutaneous every 7 days  ergocalciferol Drops 4000 Unit(s) Oral every 24 hours  ferrous    sulfate Liquid 300 milliGRAM(s) Enteral Tube daily  folic acid 1 milliGRAM(s) Oral daily  glucagon  Injectable 1 milliGRAM(s) IntraMuscular once  glucagon  Injectable 1 milliGRAM(s) IV Push once  insulin lispro (ADMELOG) corrective regimen sliding scale   SubCutaneous at bedtime  lacosamide IVPB 100 milliGRAM(s) IV Intermittent every 12 hours  midodrine 10 milliGRAM(s) Oral every 8 hours  multivitamin 1 Tablet(s) Oral daily  norepinephrine Infusion 0.05 MICROgram(s)/kG/Min IV Continuous <Continuous>  pantoprazole  Injectable 40 milliGRAM(s) IV Push two times a day  polyethylene glycol 3350 17 Gram(s) Oral daily  silver sulfADIAZINE 1% Cream 1 Application(s) Topical two times a day  valproate sodium  IVPB 750 milliGRAM(s) IV Intermittent every 8 hours    PRN Meds  acetaminophen     Tablet .. 650 milliGRAM(s) Oral every 6 hours PRN  aluminum hydroxide/magnesium hydroxide/simethicone Suspension 30 milliLiter(s) Oral every 4 hours PRN  dextrose Oral Gel 15 Gram(s) Oral once PRN  LORazepam   Injectable 2 milliGRAM(s) IV Push daily PRN  melatonin 3 milliGRAM(s) Oral at bedtime PRN
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
## 2023-08-02 NOTE — CONSULT NOTE ADULT - SUBJECTIVE AND OBJECTIVE BOX
CC:     HPI:  63-year-old male with a past medical history of hyperlipidemia, atrial fibrillation, diabetes, hypertension, large posterior fossa IPH w/ IVH/SAH/hydrocephalus, s/p suboccipital craniectomy for PICA aneurysm resection and  shunt in 2021, CAD s/p stents on ASA, Recurrent C diff, ESRD on Dialysis through Tesio cath and is trach/PEG who was brought in from nursing home initially for anemia was found also to be hypoglycemic at 9 and had 2 episodes of tonic clonic seizures in the ED.    Blood work was done at nursing home and found out that hemoglobin was 6.4. Patient was recently started on Glimeperide at the NH.  No sign recent fevers, bleeding per PEG, thickening of the sputum or hematochezia.    In the ED the patient was hemodynamically unstable and in status epilepticus.  Versed, Ativan, keppra and propofol were given in the ED and patient was started on Valproic acid as per NeuroCrit team  MAP less than 65 patient was started on Levophed after central line insertion  Labs were significant for mild leukocytosis, severe hypoglycemia and Hemoglobin 7,2    Patient admitted for status epilepticus secondary to hypoglycemia and possible sepsis (27 Jul 2023 01:00)    PERTINENT PM/SXH:   HTN (hypertension)    Diabetes mellitus    H/O intracranial hemorrhage    H/O tracheostomy    PEG (percutaneous endoscopic gastrostomy) status    Hyperlipidemia        FAMILY HISTORY:  Father: diabetes    ITEMS NOT CHECKED ARE NOT PRESENT    SOCIAL HISTORY:   Significant other/partner[ ]  Children[ ]  Denominational/Spirituality:  Substance hx:  [ ]   Tobacco hx:  [ ]   Alcohol hx: [ ]   Living Situation: [ ]Home  [x ]Long term care  [ ]Rehab [ ]Other  Home Services: [ ] HHA [ ] Visting RN [ ] Hospice  Occupation:  Home Opioid hx:  [ ] Y [ ] N [x ] I-Stop Reference No:    Reference #: [id number] - no meds     ADVANCE DIRECTIVES:     [ ] Full Code [ x] DNR  MOLST  [ ]  Living Will  [ ]   DECISION MAKER(s):  [ ] Health Care Proxy(s)  [ x] Surrogate(s)  [ ] Guardian           Name(s): Phone Number(s): Wife      BASELINE (I)ADL(s) (prior to admission):    [place]: [ ]Total  [ ] Moderate [ ]Dependent  Palliative Performance Status Version 2:         %    http://npcrc.org/files/news/palliative_performance_scale_ppsv2.pdf    Allergies    penicillin (Other)    Intolerances    MEDICATIONS  (STANDING):  aMIOdarone    Tablet 200 milliGRAM(s) Oral daily  ascorbic acid 500 milliGRAM(s) Oral daily  atorvastatin 80 milliGRAM(s) Oral at bedtime  chlorhexidine 0.12% Liquid 15 milliLiter(s) Oral Mucosa every 12 hours  chlorhexidine 2% Cloths 1 Application(s) Topical <User Schedule>  clonazePAM  Tablet 2 milliGRAM(s) Oral three times a day  collagenase Ointment 1 Application(s) Topical two times a day  collagenase Ointment 1 Application(s) Topical once  Dakins Solution - 1/2 Strength 1 Application(s) Topical every 12 hours  dextrose 5%. 1000 milliLiter(s) (50 mL/Hr) IV Continuous <Continuous>  dextrose 5%. 1000 milliLiter(s) (100 mL/Hr) IV Continuous <Continuous>  dextrose 50% Injectable 25 Gram(s) IV Push once  dextrose 50% Injectable 12.5 Gram(s) IV Push once  dextrose 50% Injectable 25 Gram(s) IV Push once  doxazosin 4 milliGRAM(s) Oral at bedtime  epoetin guanakito-epbx (RETACRIT) Injectable 09064 Unit(s) SubCutaneous every 7 days  ergocalciferol Drops 4000 Unit(s) Oral every 24 hours  folic acid 1 milliGRAM(s) Oral daily  glucagon  Injectable 1 milliGRAM(s) IV Push once  glucagon  Injectable 1 milliGRAM(s) IntraMuscular once  insulin lispro (ADMELOG) corrective regimen sliding scale   SubCutaneous at bedtime  lacosamide IVPB 100 milliGRAM(s) IV Intermittent every 12 hours  metoclopramide Injectable 5 milliGRAM(s) IV Push three times a day  midodrine 10 milliGRAM(s) Oral every 8 hours  multivitamin 1 Tablet(s) Oral daily  pantoprazole  Injectable 40 milliGRAM(s) IV Push two times a day  polyethylene glycol 3350 17 Gram(s) Oral two times a day  senna 2 Tablet(s) Oral at bedtime  silver sulfADIAZINE 1% Cream 1 Application(s) Topical two times a day  valproate sodium  IVPB 750 milliGRAM(s) IV Intermittent every 8 hours    MEDICATIONS  (PRN):  acetaminophen     Tablet .. 650 milliGRAM(s) Oral every 6 hours PRN Temp greater or equal to 38C (100.4F), Mild Pain (1 - 3)  aluminum hydroxide/magnesium hydroxide/simethicone Suspension 30 milliLiter(s) Oral every 4 hours PRN Dyspepsia  dextrose Oral Gel 15 Gram(s) Oral once PRN Blood Glucose LESS THAN 70 milliGRAM(s)/deciliter  melatonin 3 milliGRAM(s) Oral at bedtime PRN Insomnia    PRESENT SYMPTOMS: [ ]Unable to obtain due to poor mentation   Source if other than patient:  [ ]Family   [ ]Team     Pain: [ ]yes [ ]no  QOL impact -   Location -                    Aggravating factors -  Quality -  Radiation -  Timing-  Severity (0-10 scale):  Minimal acceptable level (0-10 scale):     CPOT:    https://www.[facility].org/getattachment/[email or web address]/Critical-Care-Pain-Observation-Tool-(CPOT)    PAIN AD Score:   http://geriatrictoolkit.[facility]/cog/painad.pdf (press ctrl +  left click to view)    Dyspnea:                           [ ]None[ ]Mild [ ]Moderate [ ]Severe     Respiratory Distress Observation Scale (RDOS):   A score of 0 to 2 signifies little or no respiratory distress, 3 signifies mild distress, scores 4 to 6 indicate moderate distress, and scores greater than 7 signify severe distress  https://www.[facility].ca/sites/default/files/PDFS/[email or web address]-pocoldhvvfa-[email or web address]-wrpfs.pdf    Anxiety:                             [ ]None[ ]Mild [ ]Moderate [ ]Severe   Fatigue:                             [ ]None[ ]Mild [ ]Moderate [ ]Severe   Nausea:                             [ ]None[ ]Mild [ ]Moderate [ ]Severe   Loss of appetite:              [ ]None[ ]Mild [ ]Moderate [ ]Severe   Constipation:                    [ ]None[ ]Mild [ ]Moderate [ ]Severe    Other Symptoms:  [ ]All other review of systems negative     Palliative Performance Status Version 2:         %    http://npcrc.org/files/news/palliative_performance_scale_ppsv2.pdf    PHYSICAL EXAM:  Vital Signs Last 24 Hrs  T(C): 35.7 (02 Aug 2023 16:00), Max: 36.8 (02 Aug 2023 00:00)  T(F): 96.3 (02 Aug 2023 16:00), Max: 98.2 (02 Aug 2023 00:00)  HR: 83 (02 Aug 2023 16:00) (83 - 97)  BP: 120/58 (02 Aug 2023 16:00) (91/52 - 120/58)  BP(mean): 83 (02 Aug 2023 16:00) (67 - 83)  RR: 20 (02 Aug 2023 16:00) (18 - 20)  SpO2: 99% (02 Aug 2023 16:00) (98% - 100%)    Parameters below as of 02 Aug 2023 16:00  Patient On (Oxygen Delivery Method): ventilator     I&O's Summary    01 Aug 2023 07:01  -  02 Aug 2023 07:00  --------------------------------------------------------  IN: 0 mL / OUT: 600 mL / NET: -600 mL    02 Aug 2023 07:01  -  02 Aug 2023 18:05  --------------------------------------------------------  IN: 0 mL / OUT: 400 mL / NET: -400 mL        GENERAL:  [ ] No acute distress [ ]Lethargic  [ ]Unarousable  [ ]Verbal  [ ]Non-Verbal [ ]Cachexia    BEHAVIORAL/PSYCH:  [ ]Alert and Oriented x  [ ] Anxiety [ ] Delirium [ ] Agitation [ ] Calm   EYES: [ ] No scleral icterus [ ] Scleral icterus [ ] Closed  ENMT:  [ ]Dry mouth  [ ]No external oral lesions [ ] No external ear or nose lesions  CARDIOVASCULAR:  [ ]Regular [ ]Irregular [ ]Tachy [ ]Not Tachy  [ ]Bridger [ ] Edema [ ] No edema  PULMONARY:  [ ]Tachypnea  [ ]Audible excessive secretions [ ] No labored breathing [ ] labored breathing  GASTROINTESTINAL: [ ]Soft  [ ]Distended  [ ]Not distended [ ]Non tender [ ]Tender  MUSCULOSKELETAL: [ ]No clubbing [ ] clubbing  [ ] No cyanosis [ ] cyanosis  NEUROLOGIC: [ ]No focal deficits  [ ]Follows commands  [ ]Does not follow commands  [ ]Cognitive impairment  [ ]Dysphagia  [ ]Dysarthria  [ ]Paresis   SKIN: [ ] Jaundiced [ ] Non-jaundiced [ ]Rash [ ]No Rash [ ] Warm [ ] Dry  MISC/LINES: [ ] ET tube [ ] Trach [ ]NGT/OGT [ ]PEG [ ]Barney    LABS: reviewed by me                        7.4    12.84 )-----------( 340      ( 02 Aug 2023 06:43 )             22.9   08-02    135  |  99  |  61<HH>  ----------------------------<  86  3.5   |  27  |  2.9<H>    Ca    8.5      02 Aug 2023 06:43  Phos  2.8     08-02  Mg     2.6     08-02    TPro  5.2<L>  /  Alb  2.1<L>  /  TBili  0.3  /  DBili  x   /  AST  15  /  ALT  8   /  AlkPhos  130<H>  08-02      Urinalysis Basic - ( 02 Aug 2023 06:43 )    Color: x / Appearance: x / SG: x / pH: x  Gluc: 86 mg/dL / Ketone: x  / Bili: x / Urobili: x   Blood: x / Protein: x / Nitrite: x   Leuk Esterase: x / RBC: x / WBC x   Sq Epi: x / Non Sq Epi: x / Bacteria: x      RADIOLOGY & ADDITIONAL STUDIES: reviewed by me    EKG: reviewed by me      PROTEIN CALORIE MALNUTRITION PRESENT: [ ]mild [ ]moderate [ ]severe [ ]underweight [ ]morbid obesity  https://www.andeal.org/vault/2440/web/files/ONC/Table_Clinical%20Characteristics%20to%20Document%20Malnutrition-White%20JV%20et%20al%202012.pdf    Height (cm): 172.7 (07-27-23 @ 01:16), 170.2 (06-01-23 @ 09:07), 180.3 (10-04-22 @ 22:39)  Weight (kg): 93.7 (07-27-23 @ 01:16), 89.4 (07-17-23 @ 06:20), 66.4 (10-04-22 @ 22:39)  BMI (kg/m2): 31.4 (07-27-23 @ 01:16), 30.9 (07-17-23 @ 06:20), 22.9 (06-01-23 @ 09:07)  [ ]PPSV2 < or = to 30% [ ]significant weight loss  [ ]poor nutritional intake  [ ]anasarca      [ ]Artificial Nutrition      Palliative Care Spiritual/Emotional Screening Tool Question  Severity (0-4):                    OR                    [ x] Unable to determine/NA  Score of 2 or greater indicates recommendation of Chaplaincy referral  Chaplaincy Referral: [ ] Yes [ ] Refused [ ] Following     Caregiver [place]:  [ ] Yes [ ] No [ x] Deferred  Social Work Referral [ ]  Patient and Family Centered Care Referral [ ]    Anticipatory Grief Present: [ ] Yes [ ] No [ x] Deferred  Social Work Referral [ ]  Patient and Family Centered Care Referral [ ]    Patient discussed with primary medical team MD  Palliative care education provided to patient and/or family   CC: anemia and hypoglycemia    HPI:  63-year-old male with a past medical history of hyperlipidemia, atrial fibrillation, diabetes, hypertension, large posterior fossa IPH w/ IVH/SAH/hydrocephalus, s/p suboccipital craniectomy for PICA aneurysm resection and  shunt in 2021, CAD s/p stents on ASA, Recurrent C diff, ESRD on Dialysis through Tesio cath and is trach/PEG who was brought in from nursing home initially for anemia was found also to be hypoglycemic at 9 and had 2 episodes of tonic clonic seizures in the ED.    Blood work was done at nursing home and found out that hemoglobin was 6.4. Patient was recently started on Glimeperide at the NH.  No sign recent fevers, bleeding per PEG, thickening of the sputum or hematochezia.    In the ED the patient was hemodynamically unstable and in status epilepticus.  Versed, Ativan, keppra and propofol were given in the ED and patient was started on Valproic acid as per NeuroCrit team  MAP less than 65 patient was started on Levophed after central line insertion  Labs were significant for mild leukocytosis, severe hypoglycemia and Hemoglobin 7,2    Patient admitted for status epilepticus secondary to hypoglycemia and possible sepsis (27 Jul 2023 01:00)    PERTINENT PM/SXH:   HTN (hypertension)    Diabetes mellitus    H/O intracranial hemorrhage    H/O tracheostomy    PEG (percutaneous endoscopic gastrostomy) status    Hyperlipidemia        FAMILY HISTORY:  Father: diabetes    ITEMS NOT CHECKED ARE NOT PRESENT    SOCIAL HISTORY:   Significant other/partner[ ]  Children[ ]  Oriental orthodox/Spirituality:  Substance hx:  [ ]   Tobacco hx:  [ ]   Alcohol hx: [ ]   Living Situation: [ ]Home  [x ]Long term care  [ ]Rehab [ ]Other  Home Services: [ ] HHA [ ] Visting RN [ ] Hospice  Occupation:  Home Opioid hx:  [ ] Y [ ] N [x ] I-Stop Reference No:    Reference #: [id number] - no meds     ADVANCE DIRECTIVES:     [ ] Full Code [ x] DNR  MOLST  [ ]  Living Will  [ ]   DECISION MAKER(s):  [ ] Health Care Proxy(s)  [ x] Surrogate(s)  [ ] Guardian           Name(s): Phone Number(s): Wife      BASELINE (I)ADL(s) (prior to admission):    [place]: [ ]Total  [ ] Moderate [ ]Dependent  Palliative Performance Status Version 2:         %    http://npcrc.org/files/news/palliative_performance_scale_ppsv2.pdf    Allergies    penicillin (Other)    Intolerances    MEDICATIONS  (STANDING):  aMIOdarone    Tablet 200 milliGRAM(s) Oral daily  ascorbic acid 500 milliGRAM(s) Oral daily  atorvastatin 80 milliGRAM(s) Oral at bedtime  chlorhexidine 0.12% Liquid 15 milliLiter(s) Oral Mucosa every 12 hours  chlorhexidine 2% Cloths 1 Application(s) Topical <User Schedule>  clonazePAM  Tablet 2 milliGRAM(s) Oral three times a day  collagenase Ointment 1 Application(s) Topical two times a day  collagenase Ointment 1 Application(s) Topical once  Dakins Solution - 1/2 Strength 1 Application(s) Topical every 12 hours  dextrose 5%. 1000 milliLiter(s) (50 mL/Hr) IV Continuous <Continuous>  dextrose 5%. 1000 milliLiter(s) (100 mL/Hr) IV Continuous <Continuous>  dextrose 50% Injectable 25 Gram(s) IV Push once  dextrose 50% Injectable 12.5 Gram(s) IV Push once  dextrose 50% Injectable 25 Gram(s) IV Push once  doxazosin 4 milliGRAM(s) Oral at bedtime  epoetin guanakito-epbx (RETACRIT) Injectable 33293 Unit(s) SubCutaneous every 7 days  ergocalciferol Drops 4000 Unit(s) Oral every 24 hours  folic acid 1 milliGRAM(s) Oral daily  glucagon  Injectable 1 milliGRAM(s) IV Push once  glucagon  Injectable 1 milliGRAM(s) IntraMuscular once  insulin lispro (ADMELOG) corrective regimen sliding scale   SubCutaneous at bedtime  lacosamide IVPB 100 milliGRAM(s) IV Intermittent every 12 hours  metoclopramide Injectable 5 milliGRAM(s) IV Push three times a day  midodrine 10 milliGRAM(s) Oral every 8 hours  multivitamin 1 Tablet(s) Oral daily  pantoprazole  Injectable 40 milliGRAM(s) IV Push two times a day  polyethylene glycol 3350 17 Gram(s) Oral two times a day  senna 2 Tablet(s) Oral at bedtime  silver sulfADIAZINE 1% Cream 1 Application(s) Topical two times a day  valproate sodium  IVPB 750 milliGRAM(s) IV Intermittent every 8 hours    MEDICATIONS  (PRN):  acetaminophen     Tablet .. 650 milliGRAM(s) Oral every 6 hours PRN Temp greater or equal to 38C (100.4F), Mild Pain (1 - 3)  aluminum hydroxide/magnesium hydroxide/simethicone Suspension 30 milliLiter(s) Oral every 4 hours PRN Dyspepsia  dextrose Oral Gel 15 Gram(s) Oral once PRN Blood Glucose LESS THAN 70 milliGRAM(s)/deciliter  melatonin 3 milliGRAM(s) Oral at bedtime PRN Insomnia    PRESENT SYMPTOMS: [x ]Unable to obtain due to poor mentation   Source if other than patient:  [ ]Family   [ ]Team     Pain: [ ]yes [ ]no  QOL impact -   Location -                    Aggravating factors -  Quality -  Radiation -  Timing-  Severity (0-10 scale):  Minimal acceptable level (0-10 scale):     CPOT:  2  https://www.[facility].org/getattachment/[email or web address]/Critical-Care-Pain-Observation-Tool-(CPOT)    PAIN AD Score:   http://geriatrictoolkit.[facility]/cog/painad.pdf (press ctrl +  left click to view)    Dyspnea:                           [ ]None[ ]Mild [ ]Moderate [ ]Severe     Respiratory Distress Observation Scale (RDOS): 0  A score of 0 to 2 signifies little or no respiratory distress, 3 signifies mild distress, scores 4 to 6 indicate moderate distress, and scores greater than 7 signify severe distress  https://www.[facility].ca/sites/default/files/PDFS/[email or web address]-yozydaqaykg-pqvljvzf-jshaskkjlfq-jhqzv.pdf    Anxiety:                             [ ]None[ ]Mild [ ]Moderate [ ]Severe   Fatigue:                             [ ]None[ ]Mild [ ]Moderate [ ]Severe   Nausea:                             [ ]None[ ]Mild [ ]Moderate [ ]Severe   Loss of appetite:              [ ]None[ ]Mild [ ]Moderate [ ]Severe   Constipation:                    [ ]None[ ]Mild [ ]Moderate [ ]Severe    Other Symptoms:  [x ]All other review of systems negative     Palliative Performance Status Version 2:         20%    http://npcrc.org/files/news/palliative_performance_scale_ppsv2.pdf    PHYSICAL EXAM:  Vital Signs Last 24 Hrs  T(C): 35.7 (02 Aug 2023 16:00), Max: 36.8 (02 Aug 2023 00:00)  T(F): 96.3 (02 Aug 2023 16:00), Max: 98.2 (02 Aug 2023 00:00)  HR: 83 (02 Aug 2023 16:00) (83 - 97)  BP: 120/58 (02 Aug 2023 16:00) (91/52 - 120/58)  BP(mean): 83 (02 Aug 2023 16:00) (67 - 83)  RR: 20 (02 Aug 2023 16:00) (18 - 20)  SpO2: 99% (02 Aug 2023 16:00) (98% - 100%)    Parameters below as of 02 Aug 2023 16:00  Patient On (Oxygen Delivery Method): ventilator     I&O's Summary    01 Aug 2023 07:01  -  02 Aug 2023 07:00  --------------------------------------------------------  IN: 0 mL / OUT: 600 mL / NET: -600 mL    02 Aug 2023 07:01  -  02 Aug 2023 18:05  --------------------------------------------------------  IN: 0 mL / OUT: 400 mL / NET: -400 mL        GENERAL:  [ ] No acute distress [ ]Lethargic  [x]Unarousable  [ ]Verbal  [ ]Non-Verbal [ ]Cachexia    BEHAVIORAL/PSYCH:  [ ]Alert and Oriented x  [ ] Anxiety [ ] Delirium [ ] Agitation [ x] Calm   EYES: [x ] No scleral icterus [ ] Scleral icterus [ ] Closed  ENMT:  [ ]Dry mouth  [x ]No external oral lesions [ ] No external ear or nose lesions  CARDIOVASCULAR:  [ ]Regular [ ]Irregular [ ]Tachy [x ]Not Tachy  [ ]Bridger [ ] Edema [ ] No edema  PULMONARY:  [ ]Tachypnea  [ ]Audible excessive secretions [ x] No labored breathing [ ] labored breathing  GASTROINTESTINAL: [ ]Soft  [ ]Distended  [x ]Not distended [ ]Non tender [ ]Tender  MUSCULOSKELETAL: [ ]No clubbing [ ] clubbing  [x ] No cyanosis [ ] cyanosis  NEUROLOGIC: [ ]No focal deficits  [ ]Follows commands  [ x]Does not follow commands  [ ]Cognitive impairment  [ ]Dysphagia  [ ]Dysarthria  [ ]Paresis   SKIN: [ ] Jaundiced [x ] Non-jaundiced [ ]Rash [ ]No Rash [ ] Warm [ ] Dry  MISC/LINES: [ ] ET tube [ ] Trach [ ]NGT/OGT [ ]PEG [ ]Barney    LABS: reviewed by me                        7.4    12.84 )-----------( 340      ( 02 Aug 2023 06:43 )             22.9   08-02    135  |  99  |  61<HH>  ----------------------------<  86  3.5   |  27  |  2.9<H>    Ca    8.5      02 Aug 2023 06:43  Phos  2.8     08-02  Mg     2.6     08-02    TPro  5.2<L>  /  Alb  2.1<L>  /  TBili  0.3  /  DBili  x   /  AST  15  /  ALT  8   /  AlkPhos  130<H>  08-02      Urinalysis Basic - ( 02 Aug 2023 06:43 )    Color: x / Appearance: x / SG: x / pH: x  Gluc: 86 mg/dL / Ketone: x  / Bili: x / Urobili: x   Blood: x / Protein: x / Nitrite: x   Leuk Esterase: x / RBC: x / WBC x   Sq Epi: x / Non Sq Epi: x / Bacteria: x      RADIOLOGY & ADDITIONAL STUDIES: reviewed by me    < from: CT Head No Cont (08.01.23 @ 16:39) >  IMPRESSION:  Since the prior examination there has been slightly decreased overall   ventricular dilation with right parietal ventriculoperitoneal shunt   catheterin appropriate position.    Unchanged thin right frontal convexity subdural collection with minimal   leftward midline shift as well as slitlike appearance of the lateral   ventricles with right parietal ventriculoperitoneal shunt in appropriate   position.    Redemonstration of a wedge-shaped hypodensity involving the left   occipital lobe which may reflect age-indeterminate infarct.    < end of copied text >      EKG: reviewed by me    < from: 12 Lead ECG (07.12.23 @ 02:18) >  Ventricular Rate 84 BPM    Atrial Rate 84 BPM    P-R Interval 108 ms    QRS Duration 90 ms    Q-T Interval 356 ms    QTC Calculation(Bazett) 420 ms    R Axis 22 degrees    T Axis 16 degrees    Diagnosis Line Sinus rhythm with short ME  Otherwise normal ECG    < end of copied text >      PROTEIN CALORIE MALNUTRITION PRESENT: [ ]mild [ ]moderate [ ]severe [ ]underweight [ ]morbid obesity  https://www.andeal.org/vault/2440/web/files/ONC/Table_Clinical%20Characteristics%20to%20Document%20Malnutrition-White%20JV%20et%20al%202012.pdf    Height (cm): 172.7 (07-27-23 @ 01:16), 170.2 (06-01-23 @ 09:07), 180.3 (10-04-22 @ 22:39)  Weight (kg): 93.7 (07-27-23 @ 01:16), 89.4 (07-17-23 @ 06:20), 66.4 (10-04-22 @ 22:39)  BMI (kg/m2): 31.4 (07-27-23 @ 01:16), 30.9 (07-17-23 @ 06:20), 22.9 (06-01-23 @ 09:07)  [ ]PPSV2 < or = to 30% [ ]significant weight loss  [ ]poor nutritional intake  [ ]anasarca      [ ]Artificial Nutrition      Palliative Care Spiritual/Emotional Screening Tool Question  Severity (0-4):                    OR                    [ x] Unable to determine/NA  Score of 2 or greater indicates recommendation of Chaplaincy referral  Chaplaincy Referral: [ ] Yes [ ] Refused [ ] Following     Caregiver [place]:  [ ] Yes [ ] No [ x] Deferred  Social Work Referral [ ]  Patient and Family Centered Care Referral [ ]    Anticipatory Grief Present: [ ] Yes [ ] No [ x] Deferred  Social Work Referral [ ]  Patient and Family Centered Care Referral [ ]    Patient discussed with primary medical team MD  Palliative care education provided to patient and/or family

## 2023-08-02 NOTE — CONSULT NOTE ADULT - ASSESSMENT
63 year old man with history of IPH s/p suboccipital craniectomy and  shunt in 2021, ESRD on dialysis, CAD, recurrent Cdiff presented with anemia.  Hospital course complicated by seizures, hypoglycemia. Palliative care consulted for GOC.    WIll plan to follow up with family after primary team speaks with patient's wife.       MEDD (morphine equivalent daily dose):    Education about palliative care provided to patient/family.  See Recs below.    Please call [phone] with questions or concerns 24/7.   We will continue to follow.

## 2023-08-02 NOTE — PROGRESS NOTE ADULT - ASSESSMENT
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  was found also to be hypoglycemic with blood sugar of 10 and had two episodes of tonic clonic seizures in the ED.   Patient non-verbal at baseline .    #Hypoglycemia possibly due to sulfonyl urea med  #GTC seizure x2.  #status epilepticus in the setting of hypoglycemia  #ESRD on glimiperide  - Blood sugar in ed: 10  - s/P multiple iv dextrose solutions   - Patient s/p Versed 4mg x1, Keppra 1gram IV x1, Ativan 4mg IV x2, with Propofol gtt initiated.  - per neurology valproic acid bolus and added clonazepam for seizure activity. Avtivan PRN if activity worsens (more than L arm twitching)  - fu neurosurgery adjusting shunt settings and CTHs  - CTH 7/29:   - Stable suboccipital craniectomy and cerebellar encephalomalacia with   small stable overlying pseudomeningocele compared to the prior CT head   from 7/20/2023.  -Stable moderate dilatation of the fourth ventricle and cerebral aqueduct.   Slight interval enlargement of the supratentorial ventricles could   reflect reexpansion versus mild hydrocephalus. Unchanged positioning of   the right parietal approach ventriculostomy catheter.  -Unchanged right cerebral convexity subdural collection with stable to   decreased minimal leftward midline shift.  - video EEG: Focal and generalized slowing, Interictal activity, runs of bilateral FP ( higher amplitude from the right) rhythmic activity that appears not be physiological . ( ? of artifact). It appears as questonable low amplitude spike and aftergoing slow with modified morphology( ? possiblity of change due the the prior procedure)  - consider Ativan challenge as per NICU   - now on D10, propofol 50 and levophed. if no seizure dc propofol  - Hold ASA as per Neuro  - In the setting of ESRD, D/C'ed Keppra -> s/p VPA load 40mg/kg, continue 500mg q12hrs  - Valproic acid: 35  - A1c 5.7, lipids unremarkable  - TSH 5.37, Ft4, pro-insulin and Serum Cortisol  - BCx NGTD, procalcitonin 0.47, CRP 47    #ESRD on Dialysis through Tesio cath   # Acute Renal Failure, started on HD 6/1, as per Renal team rec.   - HD today  - daily BMP monitoring, daily weight, daily strict I/O chart  - s/p permanent HD cath. placement on 7/7/23  - fu nephro notes : Biweekly schedule . no acute indications to start CVVHD today.   - Get phosphorous, Vitamin D  and PTH levels.   - On ritacrit and venofer   - consider midodrine before HD    #tube feeds regurgitating through nose  - switched to bolus feeds  - no desats  - added prokinetic (reglan)   - increased bowel regimen     # anemia of chronic disease - no gross bleeding events  - hb 7.2  - s/p 1 pack RBC  - transfuse prn for goal > 7  - On ritacrit and venofer   - Hold ASA as per Neuro    # Hx ICH   # s/p  shunt  - Bedbound from NH . chronic non-verbal , quadriplegia - continue daily care.   - c/w keppra 500mg BID for seizure prophylaxis  - fu neurosurgery   - Hold ASA as per Neuro    # chronic respiratory failure, vent dependant via trach  -  trach care,  Vent management per Pulm. team     # Chronic dysphagia  - sp peg, peg care     # Afib w/ RVR  -now rate controlled     #Acute Decompensated CHF  -continued on Amiodarone tx, rate controlled     # multiple sacral wounds and buttock  - further wound care as per Wound care specialist report, frequent body position change  - wound care per burn team recs    # h/o DM 2  - continue bsfs monitoring  with insulin sliding scale co.    DVT prophylaxis: scd  Activity:  Diet: DASH  Dispo:  Code: DNR.     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  was found also to be hypoglycemic with blood sugar of 10 and had two episodes of tonic clonic seizures in the ED.   Patient non-verbal at baseline .    #Hypoglycemia possibly due to sulfonyl urea med  #GTC seizure x2.  #status epilepticus in the setting of hypoglycemia  #ESRD on glimiperide  - Blood sugar in ed: 10  - s/P multiple iv dextrose solutions   - Patient s/p Versed 4mg x1, Keppra 1gram IV x1, Ativan 4mg IV x2, with Propofol gtt initiated.  - s/p valproic acid bolus and valproic acid standing, will increase clonazepam to 2 TID  - Valproic acid: 35 - fu free valproic acid level  - fu neurosurgery adjusting shunt settings and CTHs  - CTH 7/29:   - Stable suboccipital craniectomy and cerebellar encephalomalacia with   small stable overlying pseudomeningocele compared to the prior CT head   from 7/20/2023.  -Stable moderate dilatation of the fourth ventricle and cerebral aqueduct.   Slight interval enlargement of the supratentorial ventricles could   reflect reexpansion versus mild hydrocephalus. Unchanged positioning of   the right parietal approach ventriculostomy catheter.  -Unchanged right cerebral convexity subdural collection with stable to   decreased minimal leftward midline shift.  - video EEG: Focal and generalized slowing, Interictal activity, runs of bilateral FP ( higher amplitude from the right) rhythmic activity that appears not be physiological . ( ? of artifact). It appears as questonable low amplitude spike and aftergoing slow with modified morphology( ? possiblity of change due the the prior procedure)  - consider Ativan challenge as per NICU   - now on D10, propofol 50 and levophed. if no seizure dc propofol  - Hold ASA as per Neuro  - In the setting of ESRD, D/C'ed Keppra -> s/p VPA load 40mg/kg, continue 500mg q12hrs  - A1c 5.7, lipids unremarkable  - TSH 5.37, Ft4, pro-insulin and Serum Cortisol  - BCx NGTD, procalcitonin 0.47, CRP 47    #ESRD on Dialysis through Tesio cath   # Acute Renal Failure, started on HD 6/1, as per Renal team rec.   - HD next tomorrow  - daily BMP monitoring, daily weight, daily strict I/O chart  - s/p permanent HD cath. placement on 7/7/23  - fu nephro notes : Biweekly schedule . no acute indications to start CVVHD today.   - Vitamin D  and PTH WNL  - On ritacrit and venofer   - consider midodrine before HD    #tube feeds regurgitating through nose - resolved  - switched to bolus feeds  - no desats  - added prokinetic (reglan)   - increased bowel regimen     # anemia of chronic disease - no gross bleeding events  - hb 7.2  - s/p 1 pack RBC  - transfuse prn for goal > 7  - On ritacrit and venofer   - Hold ASA as per Neuro    # Hx ICH   # s/p  shunt  - Bedbound from NH . chronic non-verbal , quadriplegia - continue daily care.   - c/w keppra 500mg BID for seizure prophylaxis  - fu neurosurgery   - Hold ASA as per Neuro    # chronic respiratory failure, vent dependant via trach  -  trach care,  Vent management per Pulm. team     # Chronic dysphagia  - sp peg, peg care     # Afib w/ RVR  -now rate controlled     #Acute Decompensated CHF  -continued on Amiodarone tx, rate controlled     # multiple sacral wounds and buttock  - further wound care as per Wound care specialist report, frequent body position change  - wound care per burn team recs    # h/o DM 2  - continue bsfs monitoring  with insulin sliding scale co.    DVT prophylaxis: scd  Activity:  Diet: DASH  Dispo:  Code: DNR.     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  was found also to be hypoglycemic with blood sugar of 10 and had two episodes of tonic clonic seizures in the ED.   Patient non-verbal at baseline .    #Hypoglycemia possibly due to sulfonyl urea med  #GTC seizure x2.  #status epilepticus in the setting of hypoglycemia  #ESRD on glimiperide  - Blood sugar in ed: 10  - s/P multiple iv dextrose solutions   - Patient s/p Versed 4mg x1, Keppra 1gram IV x1, Ativan 4mg IV x2, with Propofol gtt initiated.  - s/p valproic acid bolus and valproic acid standing, will increase clonazepam to 2 TID  - Valproic acid: 35 - fu free valproic acid level  - fu neurosurgery adjusting shunt settings and CTHs  - CTH 7/29:   - Stable suboccipital craniectomy and cerebellar encephalomalacia with   small stable overlying pseudomeningocele compared to the prior CT head   from 7/20/2023.  -Stable moderate dilatation of the fourth ventricle and cerebral aqueduct.   Slight interval enlargement of the supratentorial ventricles could   reflect reexpansion versus mild hydrocephalus. Unchanged positioning of   the right parietal approach ventriculostomy catheter.  -Unchanged right cerebral convexity subdural collection with stable to   decreased minimal leftward midline shift.  - video EEG: Focal and generalized slowing, Interictal activity, runs of bilateral FP ( higher amplitude from the right) rhythmic activity that appears not be physiological . ( ? of artifact). It appears as questonable low amplitude spike and aftergoing slow with modified morphology( ? possiblity of change due the the prior procedure)  - consider Ativan challenge as per NICU   - now on D10, propofol 50 and levophed. if no seizure dc propofol  - Hold ASA as per Neuro  - In the setting of ESRD, D/C'ed Keppra -> s/p VPA load 40mg/kg, continue 500mg q12hrs  - A1c 5.7, lipids unremarkable  - TSH 5.37, Ft4, pro-insulin and Serum Cortisol  - BCx NGTD, procalcitonin 0.47, CRP 47    #ESRD on Dialysis through Tesio cath   # Acute Renal Failure, started on HD 6/1, as per Renal team rec.   - HD next tomorrow  - daily BMP monitoring, daily weight, daily strict I/O chart  - s/p permanent HD cath. placement on 7/7/23  - fu nephro notes : Biweekly schedule . no acute indications to start CVVHD today.   - Vitamin D and PTH WNL  - On ritacrit and venofer   - consider increasing midodrine before HD    #tube feeds regurgitating through nose - resolved  - switched to bolus feeds  - no desats  - added prokinetic (reglan)   - increased bowel regimen     # anemia of chronic disease - no gross bleeding events  - hb 7.2  - s/p 1 pack RBC  - transfuse prn for goal > 7  - On ritacrit and venofer   - Hold ASA as per Neuro    # Hx ICH   # s/p  shunt  - Bedbound from NH . chronic non-verbal , quadriplegia - continue daily care.   - c/w keppra 500mg BID for seizure prophylaxis  - fu neurosurgery   - Hold ASA as per Neuro    # chronic respiratory failure, vent dependant via trach  -  trach care,  Vent management per Pulm. team     # Chronic dysphagia  - sp peg, peg care     # Afib w/ RVR  -now rate controlled     #Acute Decompensated CHF  -continued on Amiodarone tx, rate controlled     # multiple sacral wounds and buttock  - further wound care as per Wound care specialist report, frequent body position change  - wound care per burn team recs    # h/o DM 2  - continue bsfs monitoring  with insulin sliding scale co.    DVT prophylaxis: scd  Activity:  Diet: DASH  Dispo:  Code: DNR.

## 2023-08-02 NOTE — PROGRESS NOTE ADULT - SUBJECTIVE AND OBJECTIVE BOX
ALICIA BARBOUR 63y Male  MRN#: [id number]     Hospital Day: 7d    Pt is currently admitted with the primary diagnosis of     Overnight events   -No major overnight events                                          ----------------------------------------------------------  OBJECTIVE  PAST MEDICAL & SURGICAL HISTORY  HTN (hypertension)    Diabetes mellitus    H/O intracranial hemorrhage    H/O tracheostomy    PEG (percutaneous endoscopic gastrostomy) status    Hyperlipidemia                                              -----------------------------------------------------------  MEDICATIONS:  STANDING MEDICATIONS  aMIOdarone    Tablet 200 milliGRAM(s) Oral daily  ascorbic acid 500 milliGRAM(s) Oral daily  atorvastatin 80 milliGRAM(s) Oral at bedtime  chlorhexidine 0.12% Liquid 15 milliLiter(s) Oral Mucosa every 12 hours  chlorhexidine 2% Cloths 1 Application(s) Topical <User Schedule>  clonazePAM  Tablet 1 milliGRAM(s) Oral three times a day  collagenase Ointment 1 Application(s) Topical once  collagenase Ointment 1 Application(s) Topical two times a day  Dakins Solution - 1/2 Strength 1 Application(s) Topical every 12 hours  dextrose 5%. 1000 milliLiter(s) IV Continuous <Continuous>  dextrose 5%. 1000 milliLiter(s) IV Continuous <Continuous>  dextrose 50% Injectable 25 Gram(s) IV Push once  dextrose 50% Injectable 12.5 Gram(s) IV Push once  dextrose 50% Injectable 25 Gram(s) IV Push once  doxazosin 4 milliGRAM(s) Oral at bedtime  epoetin guan[place]ito-epbx (RETACRIT) Injectable 33607 Unit(s) SubCutaneous every 7 days  ergocalciferol Drops 4000 Unit(s) Oral every 24 hours  folic acid 1 milliGRAM(s) Oral daily  glucagon  Injectable 1 milliGRAM(s) IV Push once  glucagon  Injectable 1 milliGRAM(s) IntraMuscular once  insulin lispro (ADMELOG) corrective regimen sliding scale   SubCutaneous at bedtime  lacosamide IVPB 100 milliGRAM(s) IV Intermittent every 12 hours  metoclopramide Injectable 5 milliGRAM(s) IV Push three times a day  midodrine 10 milliGRAM(s) Oral every 8 hours  multivitamin 1 Tablet(s) Oral daily  pantoprazole  Injectable 40 milliGRAM(s) IV Push two times a day  polyethylene glycol 3350 17 Gram(s) Oral two times a day  senna 2 Tablet(s) Oral at bedtime  silver sulfADIAZINE 1% Cream 1 Application(s) Topical two times a day  valproate sodium  IVPB 750 milliGRAM(s) IV Intermittent every 8 hours    PRN MEDICATIONS  acetaminophen     Tablet .. 650 milliGRAM(s) Oral every 6 hours PRN  aluminum hydroxide/magnesium hydroxide/simethicone Suspension 30 milliLiter(s) Oral every 4 hours PRN  dextrose Oral Gel 15 Gram(s) Oral once PRN  LORazepam   Injectable 2 milliGRAM(s) IV Push daily PRN  melatonin 3 milliGRAM(s) Oral at bedtime PRN                                            ------------------------------------------------------------  VITAL SIGNS: Last 24 Hours  T(C): 36.6 (02 Aug 2023 07:48), Max: 36.8 (02 Aug 2023 00:00)  T(F): 97.8 (02 Aug 2023 07:48), Max: 98.2 (02 Aug 2023 00:00)  HR: 91 (02 Aug 2023 08:03) (86 - 97)  BP: 93/52 (02 Aug 2023 07:48) (91/52 - 117/55)  BP(mean): 67 (02 Aug 2023 07:48) (67 - 79)  RR: 18 (02 Aug 2023 07:48) (18 - 20)  SpO2: 99% (02 Aug 2023 08:03) (98% - 100%)      08-01-23 @ 07:01  -  08-02-23 @ 07:00  --------------------------------------------------------  IN: 0 mL / OUT: 600 mL / NET: -600 mL                                             --------------------------------------------------------------  LABS:                        7.4    12.84 )-----------( 340      ( 02 Aug 2023 06:43 )             22.9     08-02    135  |  99  |  61<HH>  ----------------------------<  86  3.5   |  27  |  2.9<H>    Ca    8.5      02 Aug 2023 06:43  Phos  2.8     08-02  Mg     2.6     08-02    TPro  5.2<L>  /  Alb  2.1<L>  /  TBili  0.3  /  DBili  x   /  AST  15  /  ALT  8   /  AlkPhos  130<H>  08-02      Urinalysis Basic - ( 02 Aug 2023 06:43 )    Color: x / Appearance: x / SG: x / pH: x  Gluc: 86 mg/dL / Ketone: x  / Bili: x / Urobili: x   Blood: x / Protein: x / Nitrite: x   Leuk Esterase: x / RBC: x / WBC x   Sq Epi: x / Non Sq Epi: x / Bacteria: x                                                            --------------------------------------------------------------  PHYSICAL EXAM:  GENERAL: Asleep, NAD  HEENT: trach, no secretions  LUNGS: apicals clear  HEART: S1/S2  ABD: Soft  EXT: L arm not twitching this AM  SKIN: swollen arms and legs      PLAN:    # DVT prophylaxis     # GI prophylaxis     # Diet     # Activity Score (AM-PAC)    #Progress Note Handoff  Pending (specify):  Family discussion:   Disposition:

## 2023-08-02 NOTE — CONSULT NOTE ADULT - PROBLEM SELECTOR RECOMMENDATION 3
HD started in June 2023. Has required 1u PRBC this hospitalization  -continue HD  -f/u renal  -monitor BMP, CBC

## 2023-08-02 NOTE — PROGRESS NOTE ADULT - SUBJECTIVE AND OBJECTIVE BOX
T H I S   I S    N O  T   A    F I N A L I Z E D   N O T ALICIA DENTON  63y, Male  Allergy: penicillin (Other)    Hospital Day: 7d    Patient seen and examined earlier today.     PMH/PSH:  PAST MEDICAL & SURGICAL HISTORY:  HTN (hypertension)      Diabetes mellitus      H/O intracranial hemorrhage      H/O tracheostomy      PEG (percutaneous endoscopic gastrostomy) status      Hyperlipidemia          LAST 24-Hr EVENTS:    VITALS:  T(F): 97.4 (08-02-23 @ 11:49), Max: 98.2 (08-02-23 @ 00:00)  HR: 90 (08-02-23 @ 11:49)  BP: 95/52 (08-02-23 @ 11:49) (91/52 - 117/55)  RR: 20 (08-02-23 @ 11:49)  SpO2: 99% (08-02-23 @ 11:49)  FiO2: 40        TESTS & MEASUREMENTS:  Weight/BMI      07-31-23 @ 07:01  -  08-01-23 @ 07:00  --------------------------------------------------------  IN: 950 mL / OUT: 1115 mL / NET: -165 mL    08-01-23 @ 07:01  -  08-02-23 @ 07:00  --------------------------------------------------------  IN: 0 mL / OUT: 600 mL / NET: -600 mL                            7.4    12.84 )-----------( 340      ( 02 Aug 2023 06:43 )             22.9         08-02    135  |  99  |  61<HH>  ----------------------------<  86  3.5   |  27  |  2.9<H>    Ca    8.5      02 Aug 2023 06:43  Phos  2.8     08-02  Mg     2.6     08-02    TPro  5.2<L>  /  Alb  2.1<L>  /  TBili  0.3  /  DBili  x   /  AST  15  /  ALT  8   /  AlkPhos  130<H>  08-02    LIVER FUNCTIONS - ( 02 Aug 2023 06:43 )  Alb: 2.1 g/dL / Pro: 5.2 g/dL / ALK PHOS: 130 U/L / ALT: 8 U/L / AST: 15 U/L / GGT: x                 Culture - Blood (collected 07-27-23 @ 05:19)  Source: .Blood None  Final Report (08-01-23 @ 18:00):    No growth at 5 days    Culture - Blood (collected 07-27-23 @ 00:38)  Source: .Blood Blood  Final Report (08-01-23 @ 09:00):    No growth at 5 days      Urinalysis Basic - ( 02 Aug 2023 06:43 )    Color: x / Appearance: x / SG: x / pH: x  Gluc: 86 mg/dL / Ketone: x  / Bili: x / Urobili: x   Blood: x / Protein: x / Nitrite: x   Leuk Esterase: x / RBC: x / WBC x   Sq Epi: x / Non Sq Epi: x / Bacteria: x      Procalcitonin, Serum: 0.47 ng/mL (07-27-23 @ 05:33)      Ferritin: 1199 ng/mL (07-28-23 @ 05:31)            A1C with Estimated Average Glucose Result: 5.7 % (07-27-23 @ 05:33)  A1C with Estimated Average Glucose Result: 6.2 % (05-31-23 @ 05:50)      Indwelling Urethral Catheter:     Connect To:  Straight Drainage/Gravity    Indication:  Urine Output Monitoring in Critically Ill (07-27-23 @ 06:27) (not performed)  Indwelling Urethral Catheter:     Connect To:  Straight Drainage/Gravity    Indication:  Urine Output Monitoring in Critically Ill (07-26-23 @ 23:53) (not performed)      RADIOLOGY, ECG, & ADDITIONAL TESTS:  12 Lead ECG:   Ventricular Rate 84 BPM    Atrial Rate 84 BPM    P-R Interval 108 ms    QRS Duration 90 ms    Q-T Interval 356 ms    QTC Calculation(Bazett) 420 ms    R Axis 22 degrees    T Axis 16 degrees    Diagnosis Line Sinus rhythm with short TN  Otherwise normal ECG    Confirmed by MACIEL BORREGO MD ([id number]) on 7/12/2023 6:50:09 AM (07-12-23 @ 02:18)    CT Head No Cont:   ACC: [id number] EXAM:  CT BRAIN   ORDERED BY: EDYTA RICHARDS     PROCEDURE DATE:  08/01/2023          INTERPRETATION:  CLINICAL INDICATION: Ventricular peritoneal shunt   reprogramming.    Technique: CT of the head was performed without contrast.    Multiple contiguous axial images were acquired from the skullbase to the   vertex without the administration of intravenous contrast.  Coronal and   sagittal reformations were made.    COMPARISON:  prior head CT dated 7/30/2023.    FINDINGS:      EEG leads are noted overlying scalp causing streak artifact limiting   evaluation..    Since the prior examination there has been overall decreased lateral and   third ventricular dilation.    Patient is again noted to be status post suboccipital craniectomy with   essentially unchanged fluid collection noted at the craniectomy site, 4.4   x 1.4 cm. Unchanged hyperdensity anterior to the fluid collection at the   craniectomy site. Stable positioning of a right parietal approach   ventricular peritoneal shunt catheter, terminating near the foramen of   Monro. The lateral ventricles are slitlike in size, unchanged from prior.   There is similar ex vacuo dilation of the fourth ventricle and cerebral   aqueduct    Unchanged hypodense subdural collection along the right frontal convexity   measuring approximately 7 mm in thickness with approximately 3 mm right   to left midline shift.    Redemonstrated hypodensity in the left occipital lobe is without change.   Unchanged left high parietal hypodensity, likely reflecting chronic   infarct though new since the remote examinations.    Stable mucosal thickening in the right sphenoid sinus. Mild mucosal   thickening in the partially visualized left maxillary sinus. Complete   opacification of thebilateral mastoid air cells, unchanged. The   visualized orbits are unremarkable.      IMPRESSION:  Since the prior examination there has been slightly decreased overall   ventricular dilation with right parietal ventriculoperitoneal shunt   catheterin appropriate position.    Unchanged thin right frontal convexity subdural collection with minimal   leftward midline shift as well as slitlike appearance of the lateral   ventricles with right parietal ventriculoperitoneal shunt in appropriate   position.    Redemonstration of a wedge-shaped hypodensity involving the left   occipital lobe which may reflect age-indeterminate infarct.      --- End of Report ---            JANIE MCMILLAN MD; Attending Radiologist  This document has been electronically signed. Aug  1 2023  5:47PM (08-01-23 @ 16:39)  CT Head No Cont:   ACC: [id number] EXAM:  CT BRAIN   ORDERED BY: MELITON METZGER     PROCEDURE DATE:  07/30/2023          INTERPRETATION:  CLINICAL INDICATION: Follow-up shunt    TECHNIQUE: CT of the head was performed without the administration of   intravenous contrast.    COMPARISON: CT head 7/29/2023    FINDINGS:    Evaluation is somewhat limited by streak artifact from EEG leads.    Ventriculostomy catheter is seen traversing the right parietal region   with tip terminating in the right lateral ventricle, stable in position.   No evidence of pericatheter hemorrhage. There is slight further   enlargement of the supratentorial ventricles since the prior CT head from   7/29/2023. Stable dilatation of the fourth ventricle and the cerebral   aqueduct.    Stable volume of the thin subdural hemorrhages along the right convexity.   Nearly resolved subdural hemorrhage along the left convexity. Stable   minimal leftward midline shift.    Stable suboccipital craniectomy and evolving bilateral cerebellar   encephalomalacia. 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   Stable hyperdensity anterior to the CSF collection, unchanged since the   prior CT from 11/4/2021.    Stable left occipital patchy hypodensity, likely representing subacute   infarct.    There is no evidence of new acute territorial infarct or intracranial   hemorrhage.    The visualized intraorbital contents are normal. Partial opacification of   the right sphenoid sinus. Mild mucosal thickening of the left sphenoid   sinus. Complete opacification of the bilateral mastoid and middle ear   cavities.    IMPRESSION:    Slight further enlargement of the supratentorial ventricles since the   prior CT head from 7/29/2023. This could reflect reexpansion of the   ventricles versus mild hydrocephalus. Stable positioning of the right   parietal approach ventriculostomy catheter.    Unchanged thin subdural hemorrhage along the right convexity and minimal   midline shift to the left.    Stable suboccipital craniectomy and small pseudomeningocele.    Redemonstrated small left occipital patchy hyperdensity which could   reflect subacute infarct.    --- End of Report ---          JAZMÍN RIEC MD; Resident Radiologist  This document has been electronically signed.  PALOMO WILKINSON MD; Attending Radiologist  This document has been electronically signed. Jul 31 2023  2:49PM (07-30-23 @ 15:58)    RECENT DIAGNOSTIC ORDERS:  Valproic Acid, Free: 16:00 (08-02-23 @ 11:18)  EEG w/ Video Each 12-26 Hours, Unmonitored (08-01-23 @ 13:19)      MEDICATIONS:  MEDICATIONS  (STANDING):  aMIOdarone    Tablet 200 milliGRAM(s) Oral daily  ascorbic acid 500 milliGRAM(s) Oral daily  atorvastatin 80 milliGRAM(s) Oral at bedtime  chlorhexidine 0.12% Liquid 15 milliLiter(s) Oral Mucosa every 12 hours  chlorhexidine 2% Cloths 1 Application(s) Topical <User Schedule>  clonazePAM  Tablet 2 milliGRAM(s) Oral three times a day  collagenase Ointment 1 Application(s) Topical two times a day  collagenase Ointment 1 Application(s) Topical once  Dakins Solution - 1/2 Strength 1 Application(s) Topical every 12 hours  dextrose 5%. 1000 milliLiter(s) (50 mL/Hr) IV Continuous <Continuous>  dextrose 5%. 1000 milliLiter(s) (100 mL/Hr) IV Continuous <Continuous>  dextrose 50% Injectable 25 Gram(s) IV Push once  dextrose 50% Injectable 25 Gram(s) IV Push once  dextrose 50% Injectable 12.5 Gram(s) IV Push once  doxazosin 4 milliGRAM(s) Oral at bedtime  epoetin guanakito-epbx (RETACRIT) Injectable 92721 Unit(s) SubCutaneous every 7 days  ergocalciferol Drops 4000 Unit(s) Oral every 24 hours  folic acid 1 milliGRAM(s) Oral daily  glucagon  Injectable 1 milliGRAM(s) IV Push once  glucagon  Injectable 1 milliGRAM(s) IntraMuscular once  insulin lispro (ADMELOG) corrective regimen sliding scale   SubCutaneous at bedtime  lacosamide IVPB 100 milliGRAM(s) IV Intermittent every 12 hours  metoclopramide Injectable 5 milliGRAM(s) IV Push three times a day  midodrine 10 milliGRAM(s) Oral every 8 hours  multivitamin 1 Tablet(s) Oral daily  pantoprazole  Injectable 40 milliGRAM(s) IV Push two times a day  polyethylene glycol 3350 17 Gram(s) Oral two times a day  senna 2 Tablet(s) Oral at bedtime  silver sulfADIAZINE 1% Cream 1 Application(s) Topical two times a day  valproate sodium  IVPB 750 milliGRAM(s) IV Intermittent every 8 hours    MEDICATIONS  (PRN):  acetaminophen     Tablet .. 650 milliGRAM(s) Oral every 6 hours PRN Temp greater or equal to 38C (100.4F), Mild Pain (1 - 3)  aluminum hydroxide/magnesium hydroxide/simethicone Suspension 30 milliLiter(s) Oral every 4 hours PRN Dyspepsia  dextrose Oral Gel 15 Gram(s) Oral once PRN Blood Glucose LESS THAN 70 milliGRAM(s)/deciliter  melatonin 3 milliGRAM(s) Oral at bedtime PRN Insomnia      HOME MEDICATIONS:  acetaminophen 325 mg oral tablet (07-27)  albuterol 2.5 mg/3 mL (0.083%) inhalation solution (07-27)  Amaryl 1 mg oral tablet (07-27)  amiodarone 200 mg oral tablet (07-27)  ascorbic acid 500 mg oral tablet (07-27)  aspirin 81 mg oral tablet, chewable (07-27)  atorvastatin 80 mg oral tablet (07-27)  Atrovent 18 mcg/inh inhalation aerosol (07-27)  bumetanide 2 mg oral tablet (07-27)  chlorhexidine 0.12% mucous membrane liquid (07-27)  doxazosin 4 mg oral tablet (07-27)  ergocalciferol 200 mcg/mL (8000 intl units/mL) oral solution (07-27)  ferrous sulfate 220 mg/5 mL (44 mg/5 mL elemental iron) oral elixir (07-27)  folic acid 1 mg oral tablet (07-27)  Keppra 100 mg/mL oral solution (07-27)  Multiple Vitamins oral tablet (07-27)  pantoprazole 40 mg oral delayed release tablet (07-27)  polyethylene glycol 3350 oral powder for reconstitution (07-27)  Retacrit 40,000 units/mL preservative-free injectable solution (07-27)  SSD 1% topical cream (07-27)  Venofer 20 mg/mL intravenous solution (07-27)  zinc sulfate 220 mg oral tablet (07-27)      PHYSICAL EXAM:  GENERAL:   CHEST/LUNG:   HEART:   ABDOMEN:   EXTREMITIES:               ALICIA BARBOUR  63y, Male  Allergy: penicillin (Other)    Hospital Day: 7d    Patient seen and examined earlier today. Pt is non verbal     PMH/PSH:  PAST MEDICAL & SURGICAL HISTORY:  HTN (hypertension)      Diabetes mellitus      H/O intracranial hemorrhage      H/O tracheostomy      PEG (percutaneous endoscopic gastrostomy) status      Hyperlipidemia          LAST 24-Hr EVENTS:    VITALS:  T(F): 97.4 (08-02-23 @ 11:49), Max: 98.2 (08-02-23 @ 00:00)  HR: 90 (08-02-23 @ 11:49)  BP: 95/52 (08-02-23 @ 11:49) (91/52 - 117/55)  RR: 20 (08-02-23 @ 11:49)  SpO2: 99% (08-02-23 @ 11:49)  FiO2: 40        TESTS & MEASUREMENTS:  Weight/BMI      07-31-23 @ 07:01  -  08-01-23 @ 07:00  --------------------------------------------------------  IN: 950 mL / OUT: 1115 mL / NET: -165 mL    08-01-23 @ 07:01  -  08-02-23 @ 07:00  --------------------------------------------------------  IN: 0 mL / OUT: 600 mL / NET: -600 mL                            7.4    12.84 )-----------( 340      ( 02 Aug 2023 06:43 )             22.9         08-02    135  |  99  |  61<HH>  ----------------------------<  86  3.5   |  27  |  2.9<H>    Ca    8.5      02 Aug 2023 06:43  Phos  2.8     08-02  Mg     2.6     08-02    TPro  5.2<L>  /  Alb  2.1<L>  /  TBili  0.3  /  DBili  x   /  AST  15  /  ALT  8   /  AlkPhos  130<H>  08-02    LIVER FUNCTIONS - ( 02 Aug 2023 06:43 )  Alb: 2.1 g/dL / Pro: 5.2 g/dL / ALK PHOS: 130 U/L / ALT: 8 U/L / AST: 15 U/L / GGT: x                 Culture - Blood (collected 07-27-23 @ 05:19)  Source: .Blood None  Final Report (08-01-23 @ 18:00):    No growth at 5 days    Culture - Blood (collected 07-27-23 @ 00:38)  Source: .Blood Blood  Final Report (08-01-23 @ 09:00):    No growth at 5 days      Urinalysis Basic - ( 02 Aug 2023 06:43 )    Color: x / Appearance: x / SG: x / pH: x  Gluc: 86 mg/dL / Ketone: x  / Bili: x / Urobili: x   Blood: x / Protein: x / Nitrite: x   Leuk Esterase: x / RBC: x / WBC x   Sq Epi: x / Non Sq Epi: x / Bacteria: x      Procalcitonin, Serum: 0.47 ng/mL (07-27-23 @ 05:33)      Ferritin: 1199 ng/mL (07-28-23 @ 05:31)            A1C with Estimated Average Glucose Result: 5.7 % (07-27-23 @ 05:33)  A1C with Estimated Average Glucose Result: 6.2 % (05-31-23 @ 05:50)      Indwelling Urethral Catheter:     Connect To:  Straight Drainage/Gravity    Indication:  Urine Output Monitoring in Critically Ill (07-27-23 @ 06:27) (not performed)  Indwelling Urethral Catheter:     Connect To:  Straight Drainage/Gravity    Indication:  Urine Output Monitoring in Critically Ill (07-26-23 @ 23:53) (not performed)      RADIOLOGY, ECG, & ADDITIONAL TESTS:  12 Lead ECG:   Ventricular Rate 84 BPM    Atrial Rate 84 BPM    P-R Interval 108 ms    QRS Duration 90 ms    Q-T Interval 356 ms    QTC Calculation(Bazett) 420 ms    R Axis 22 degrees    T Axis 16 degrees    Diagnosis Line Sinus rhythm with short [place]  Otherwise normal ECG    Confirmed by MACIEL BORREGO MD ([id number]) on 7/12/2023 6:50:09 AM (07-12-23 @ 02:18)    CT Head No Cont:   ACC: [id number] EXAM:  CT BRAIN   ORDERED BY: EDYTA RICHARDS     PROCEDURE DATE:  08/01/2023          INTERPRETATION:  CLINICAL INDICATION: Ventricular peritoneal shunt   reprogramming.    Technique: CT of the head was performed without contrast.    Multiple contiguous axial images were acquired from the skullbase to the   vertex without the administration of intravenous contrast.  Coronal and   sagittal reformations were made.    COMPARISON:  prior head CT dated 7/30/2023.    FINDINGS:      EEG leads are noted overlying scalp causing streak artifact limiting   evaluation..    Since the prior examination there has been overall decreased lateral and   third ventricular dilation.    Patient is again noted to be status post suboccipital craniectomy with   essentially unchanged fluid collection noted at the craniectomy site, 4.4   x 1.4 cm. Unchanged hyperdensity anterior to the fluid collection at the   craniectomy site. Stable positioning of a right parietal approach   ventricular peritoneal shunt catheter, terminating near the foramen of   Monro. The lateral ventricles are slitlike in size, unchanged from prior.   There is similar ex vacuo dilation of the fourth ventricle and cerebral   aqueduct    Unchanged hypodense subdural collection along the right frontal convexity   measuring approximately 7 mm in thickness with approximately 3 mm right   to left midline shift.    Redemonstrated hypodensity in the left occipital lobe is without change.   Unchanged left high parietal hypodensity, likely reflecting chronic   infarct though new since the remote examinations.    Stable mucosal thickening in the right sphenoid sinus. Mild mucosal   thickening in the partially visualized left maxillary sinus. Complete   opacification of thebilateral mastoid air cells, unchanged. The   visualized orbits are unremarkable.      IMPRESSION:  Since the prior examination there has been slightly decreased overall   ventricular dilation with right parietal ventriculoperitoneal shunt   catheterin appropriate position.    Unchanged thin right frontal convexity subdural collection with minimal   leftward midline shift as well as slitlike appearance of the lateral   ventricles with right parietal ventriculoperitoneal shunt in appropriate   position.    Redemonstration of a wedge-shaped hypodensity involving the left   occipital lobe which may reflect age-indeterminate infarct.      --- End of Report ---            JANIE MCMILLAN MD; Attending Radiologist  This document has been electronically signed. Aug  1 2023  5:47PM (08-01-23 @ 16:39)  CT Head No Cont:   ACC: [id number] EXAM:  CT BRAIN   ORDERED BY: MELITON METZGER     PROCEDURE DATE:  07/30/2023          INTERPRETATION:  CLINICAL INDICATION: Follow-up shunt    TECHNIQUE: CT of the head was performed without the administration of   intravenous contrast.    COMPARISON: CT head 7/29/2023    FINDINGS:    Evaluation is somewhat limited by streak artifact from EEG leads.    Ventriculostomy catheter is seen traversing the right parietal region   with tip terminating in the right lateral ventricle, stable in position.   No evidence of pericatheter hemorrhage. There is slight further   enlargement of the supratentorial ventricles since the prior CT head from   7/29/2023. Stable dilatation of the fourth ventricle and the cerebral   aqueduct.    Stable volume of the thin subdural hemorrhages along the right convexity.   Nearly resolved subdural hemorrhage along the left convexity. Stable   minimal leftward midline shift.    Stable suboccipital craniectomy and evolving bilateral cerebellar   encephalomalacia. 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   Stable hyperdensity anterior to the CSF collection, unchanged since the   prior CT from 11/4/2021.    Stable left occipital patchy hypodensity, likely representing subacute   infarct.    There is no evidence of new acute territorial infarct or intracranial   hemorrhage.    The visualized intraorbital contents are normal. Partial opacification of   the right sphenoid sinus. Mild mucosal thickening of the left sphenoid   sinus. Complete opacification of the bilateral mastoid and middle ear   cavities.    IMPRESSION:    Slight further enlargement of the supratentorial ventricles since the   prior CT head from 7/29/2023. This could reflect reexpansion of the   ventricles versus mild hydrocephalus. Stable positioning of the right   parietal approach ventriculostomy catheter.    Unchanged thin subdural hemorrhage along the right convexity and minimal   midline shift to the left.    Stable suboccipital craniectomy and small pseudomeningocele.    Redemonstrated small left occipital patchy hyperdensity which could   reflect subacute infarct.    --- End of Report ---          JAZMÍN RICE MD; Resident Radiologist  This document has been electronically signed.  PALOMO WILKINSON MD; Attending Radiologist  This document has been electronically signed. Jul 31 2023  2:49PM (07-30-23 @ 15:58)    RECENT DIAGNOSTIC ORDERS:  Valproic Acid, Free: 16:00 (08-02-23 @ 11:18)  EEG w/ Video Each 12-26 Hours, Unmonitored (08-01-23 @ 13:19)      MEDICATIONS:  MEDICATIONS  (STANDING):  aMIOdarone    Tablet 200 milliGRAM(s) Oral daily  ascorbic acid 500 milliGRAM(s) Oral daily  atorvastatin 80 milliGRAM(s) Oral at bedtime  chlorhexidine 0.12% Liquid 15 milliLiter(s) Oral Mucosa every 12 hours  chlorhexidine 2% Cloths 1 Application(s) Topical <User Schedule>  clonazePAM  Tablet 2 milliGRAM(s) Oral three times a day  collagenase Ointment 1 Application(s) Topical two times a day  collagenase Ointment 1 Application(s) Topical once  Dakins Solution - 1/2 Strength 1 Application(s) Topical every 12 hours  dextrose 5%. 1000 milliLiter(s) (50 mL/Hr) IV Continuous <Continuous>  dextrose 5%. 1000 milliLiter(s) (100 mL/Hr) IV Continuous <Continuous>  dextrose 50% Injectable 25 Gram(s) IV Push once  dextrose 50% Injectable 25 Gram(s) IV Push once  dextrose 50% Injectable 12.5 Gram(s) IV Push once  doxazosin 4 milliGRAM(s) Oral at bedtime  epoetin guanakito-epbx (RETACRIT) Injectable 25412 Unit(s) SubCutaneous every 7 days  ergocalciferol Drops 4000 Unit(s) Oral every 24 hours  folic acid 1 milliGRAM(s) Oral daily  glucagon  Injectable 1 milliGRAM(s) IV Push once  glucagon  Injectable 1 milliGRAM(s) IntraMuscular once  insulin lispro (ADMELOG) corrective regimen sliding scale   SubCutaneous at bedtime  lacosamide IVPB 100 milliGRAM(s) IV Intermittent every 12 hours  metoclopramide Injectable 5 milliGRAM(s) IV Push three times a day  midodrine 10 milliGRAM(s) Oral every 8 hours  multivitamin 1 Tablet(s) Oral daily  pantoprazole  Injectable 40 milliGRAM(s) IV Push two times a day  polyethylene glycol 3350 17 Gram(s) Oral two times a day  senna 2 Tablet(s) Oral at bedtime  silver sulfADIAZINE 1% Cream 1 Application(s) Topical two times a day  valproate sodium  IVPB 750 milliGRAM(s) IV Intermittent every 8 hours    MEDICATIONS  (PRN):  acetaminophen     Tablet .. 650 milliGRAM(s) Oral every 6 hours PRN Temp greater or equal to 38C (100.4F), Mild Pain (1 - 3)  aluminum hydroxide/magnesium hydroxide/simethicone Suspension 30 milliLiter(s) Oral every 4 hours PRN Dyspepsia  dextrose Oral Gel 15 Gram(s) Oral once PRN Blood Glucose LESS THAN 70 milliGRAM(s)/deciliter  melatonin 3 milliGRAM(s) Oral at bedtime PRN Insomnia      HOME MEDICATIONS:  acetaminophen 325 mg oral tablet (07-27)  albuterol 2.5 mg/3 mL (0.083%) inhalation solution (07-27)  Amaryl 1 mg oral tablet (07-27)  amiodarone 200 mg oral tablet (07-27)  ascorbic acid 500 mg oral tablet (07-27)  aspirin 81 mg oral tablet, chewable (07-27)  atorvastatin 80 mg oral tablet (07-27)  Atrovent 18 mcg/inh inhalation aerosol (07-27)  bumetanide 2 mg oral tablet (07-27)  chlorhexidine 0.12% mucous membrane liquid (07-27)  doxazosin 4 mg oral tablet (07-27)  ergocalciferol 200 mcg/mL (8000 intl units/mL) oral solution (07-27)  ferrous sulfate 220 mg/5 mL (44 mg/5 mL elemental iron) oral elixir (07-27)  folic acid 1 mg oral tablet (07-27)  Keppra 100 mg/mL oral solution (07-27)  Multiple Vitamins oral tablet (07-27)  pantoprazole 40 mg oral delayed release tablet (07-27)  polyethylene glycol 3350 oral powder for reconstitution (07-27)  Retacrit 40,000 units/mL preservative-free injectable solution (07-27)  SSD 1% topical cream (07-27)  Venofer 20 mg/mL intravenous solution (07-27)  zinc sulfate 220 mg oral tablet (07-27)      PHYSICAL EXAM:  On exam  General: non verbal, non responsive,  chronic ill appearance, connected to VEEG   Lungs:  decrease breath sounds b/l, trach on mech vent   Heart: regular ryhthm   Abdomen: soft, non tender non distended + PEG, condom cath, digni shield   Ext: anasarca  , LUE intermittent twitching or jerky like

## 2023-08-02 NOTE — PROGRESS NOTE ADULT - SUBJECTIVE AND OBJECTIVE BOX
Neurology Progress Note    Interval History:    Patient was seen and examined, still having rhythmic jerking movements L arm. On VEEG. VEEG picture w/o significant changes. On  tid,  bid, CLZ 1 tid. Alb 2.1. VPA level - 35.      Medications:  acetaminophen     Tablet .. 650 milliGRAM(s) Oral every 6 hours PRN  aluminum hydroxide/magnesium hydroxide/simethicone Suspension 30 milliLiter(s) Oral every 4 hours PRN  aMIOdarone    Tablet 200 milliGRAM(s) Oral daily  ascorbic acid 500 milliGRAM(s) Oral daily  atorvastatin 80 milliGRAM(s) Oral at bedtime  clonazePAM  Tablet 2 milliGRAM(s) Oral three times a day  collagenase Ointment 1 Application(s) Topical two times a day  collagenase Ointment 1 Application(s) Topical once  Dakins Solution - 1/2 Strength 1 Application(s) Topical every 12 hours  doxazosin 4 milliGRAM(s) Oral at bedtime  epoetin guanakito-epbx (RETACRIT) Injectable 51824 Unit(s) SubCutaneous every 7 days  ergocalciferol Drops 4000 Unit(s) Oral every 24 hours  folic acid 1 milliGRAM(s) Oral daily  insulin lispro (ADMELOG) corrective regimen sliding scale   SubCutaneous at bedtime  lacosamide IVPB 100 milliGRAM(s) IV Intermittent every 12 hours  melatonin 3 milliGRAM(s) Oral at bedtime PRN  metoclopramide Injectable 5 milliGRAM(s) IV Push three times a day  midodrine 10 milliGRAM(s) Oral every 8 hours  multivitamin 1 Tablet(s) Oral daily  pantoprazole  Injectable 40 milliGRAM(s) IV Push two times a day  polyethylene glycol 3350 17 Gram(s) Oral two times a day  senna 2 Tablet(s) Oral at bedtime  silver sulfADIAZINE 1% Cream 1 Application(s) Topical two times a day  valproate sodium  IVPB 750 milliGRAM(s) IV Intermittent every 8 hours      Vital Signs Last 24 Hrs  T(C): 36.3 (02 Aug 2023 11:49), Max: 36.8 (02 Aug 2023 00:00)  T(F): 97.4 (02 Aug 2023 11:49), Max: 98.2 (02 Aug 2023 00:00)  HR: 90 (02 Aug 2023 11:49) (86 - 97)  BP: 95/52 (02 Aug 2023 11:49) (91/52 - 117/55)  BP(mean): 69 (02 Aug 2023 11:49) (67 - 76)  RR: 20 (02 Aug 2023 11:49) (18 - 20)  SpO2: 99% (02 Aug 2023 11:49) (98% - 100%)    Parameters below as of 02 Aug 2023 11:49  Patient On (Oxygen Delivery Method): ventilator      NEUROLOGICAL EXAMINATION:  Appears ill, on Mechanical vent (trach to vent), no any response to verbal or noxious stimuli, semirhythmic jerking of his L arm in dystonic posture.   Pupils: almost non-reactive maybe some flickering on direct light, eyes with horizontal jerking movements no corneal b/l. No oculocephalic, no gag reflex.  General tone of muscle - with tonic on L side, no movements, no signs of withdrawal. DTR abs throughout.       Labs:  CBC Full  -  ( 02 Aug 2023 06:43 )  WBC Count : 12.84 K/uL  RBC Count : 2.46 M/uL  Hemoglobin : 7.4 g/dL  Hematocrit : 22.9 %  Platelet Count - Automated : 340 K/uL  Mean Cell Volume : 93.1 fL  Mean Cell Hemoglobin : 30.1 pg  Mean Cell Hemoglobin Concentration : 32.3 g/dL

## 2023-08-02 NOTE — PROGRESS NOTE ADULT - SUBJECTIVE AND OBJECTIVE BOX
NUTRITION SUPPORT TEAM  -  PROGRESS NOTE   vented via trach   arms contracted  on G tube feed    tolerating enteral feeds,   abdomen soft +g-tube in place   REVIEW OF SYSTEMS:  Negative except as noted above.     VITALS:  T(F): 97.4 (08-02 @ 11:49), Max: 97.8 (08-02 @ 05:00)  HR: 94 (08-02 @ 14:37) (86 - 94)  BP: 95/52 (08-02 @ 11:49) (91/52 - 95/52)  RR: 20 (08-02 @ 11:49) (18 - 20)  SpO2: 100% (08-02 @ 14:37) (98% - 100%)  Mode: AC/ CMV (Assist Control/ Continuous Mandatory Ventilation)  RR (machine): 18  TV (machine): 450  FiO2: 40  PEEP: 8  ITime: 1  MAP: 13  PIP: 25    HEIGHT/WEIGHT/BMI:   Height (cm): 172.7 (07-27), 170.2 (06-01), 180.3 (10-04)  Weight (kg): 93.7 (07-27), 89.4 (07-17), 66.4 (10-04)  BMI (kg/m2): 31.4 (07-27), 30.9 (07-17), 22.9 (06-01), 20.4 (10-04)  08-01-23 @ 07:01  -  08-02-23 @ 07:00  --------------------------------------------------------  IN:  Total IN: 0 mL    OUT:    Incontinent per Collection Bag (mL): 50 mL    Rectal Tube (mL): 50 mL    Voided (mL): 500 mL  Total OUT: 600 mL    Total NET: -600 mL        MEDICATIONS:   acetaminophen     Tablet .. 650 milliGRAM(s) Oral every 6 hours PRN  aluminum hydroxide/magnesium hydroxide/simethicone Suspension 30 milliLiter(s) Oral every 4 hours PRN  aMIOdarone    Tablet 200 milliGRAM(s) Oral daily  ascorbic acid 500 milliGRAM(s) Oral daily  atorvastatin 80 milliGRAM(s) Oral at bedtime  chlorhexidine 0.12% Liquid 15 milliLiter(s) Oral Mucosa every 12 hours  chlorhexidine 2% Cloths 1 Application(s) Topical <User Schedule>  clonazePAM  Tablet 2 milliGRAM(s) Oral three times a day  collagenase Ointment 1 Application(s) Topical two times a day  collagenase Ointment 1 Application(s) Topical once  Dakins Solution - 1/2 Strength 1 Application(s) Topical every 12 hours  doxazosin 4 milliGRAM(s) Oral at bedtime  epoetin guanakito-epbx (RETACRIT) Injectable 29290 Unit(s) SubCutaneous every 7 days  ergocalciferol Drops 4000 Unit(s) Oral every 24 hours  folic acid 1 milliGRAM(s) Oral daily  glucagon  Injectable 1 milliGRAM(s) IV Push once  glucagon  Injectable 1 milliGRAM(s) IntraMuscular once  insulin lispro (ADMELOG) corrective regimen sliding scale   SubCutaneous at bedtime  lacosamide IVPB 100 milliGRAM(s) IV Intermittent every 12 hours  melatonin 3 milliGRAM(s) Oral at bedtime PRN  metoclopramide Injectable 5 milliGRAM(s) IV Push three times a day  midodrine 10 milliGRAM(s) Oral every 8 hours  multivitamin 1 Tablet(s) Oral daily  pantoprazole  Injectable 40 milliGRAM(s) IV Push two times a day  polyethylene glycol 3350 17 Gram(s) Oral two times a day  senna 2 Tablet(s) Oral at bedtime  silver sulfADIAZINE 1% Cream 1 Application(s) Topical two times a day  valproate sodium  IVPB 750 milliGRAM(s) IV Intermittent every 8 hours    LABS:                         7.4    12.84 )-----------( 340      ( 02 Aug 2023 06:43 )             22.9     135  |  99  |  61<HH>  ----------------------------<  86          (08-02-23 @ 06:43)  3.5   |  27  |  2.9<H>    Ca    8.5          (08-02-23 @ 06:43)  Phos  2.8         (08-02-23 @ 06:43)  Mg     2.6         (08-02-23 @ 06:43)    TPro  5.2<L>  /  Alb  2.1<L>  /  TBili  0.3  /  DBili  x   /  AST  15  /  ALT  8   /  AlkPhos  130<H>       08-02-23 @ 06:43  Triglycerides, Serum: 37 mg/dL (07-27 @ 05:33)  Vitamin D, 25-Hydroxy: 42 ng/mL (08-01 @ 04:30)  Folate: >20.0 ng/mL (06-03 @ 06:50)  Vitamin B12, Serum: 1908 pg/mL (06-06 @ 10:40)  Zinc Level, Plasma: 54 ug/dL (06-03 @ 06:50)    Blood Glucose (Past 24 hours):  89 mg/dL (08-02 @ 11:02)  111 mg/dL (08-02 @ 08:01)  158 mg/dL (08-01 @ 21:13)  155 mg/dL (08-01 @ 18:09)    DIET:   Diet, NPO with Tube Feed:   Tube Feeding Modality: Gastrostomy  Peptamen A.F. Formula  Total Volume for 24 Hours (mL): 1500  Bolus  Total Volume of Bolus (mL):  375  Tube Feed Frequency: Every 6 hours   Tube Feed Start Time: 20:00  Bolus Feed Rate (mL per Hour): 500   Bolus Feed Duration (in Hours): 0.75  Free Water Flush Instructions:  50ml water by syringe push before and after each feed (08-01-23 @ 07:44) [Active]    RADIOLOGY:   < from: Xray Chest 1 View- PORTABLE-Routine (Xray Chest 1 View- PORTABLE-Routine in AM.) (08.02.23 @ 05:32) >  IMPRESSION:  Unchanged bilateral opacities/effusions.     NUTRITION SUPPORT TEAM  -  PROGRESS NOTE   vented via trach , min responsive  arms contracted  on G tube feed    tolerating enteral feeds,   abdomen soft +g-tube in place   REVIEW OF SYSTEMS:  Negative except as noted above.     VITALS:  T(F): 97.4 (08-02 @ 11:49), Max: 97.8 (08-02 @ 05:00)  HR: 94 (08-02 @ 14:37) (86 - 94)  BP: 95/52 (08-02 @ 11:49) (91/52 - 95/52)  RR: 20 (08-02 @ 11:49) (18 - 20)  SpO2: 100% (08-02 @ 14:37) (98% - 100%)  Mode: AC/ CMV (Assist Control/ Continuous Mandatory Ventilation)  RR (machine): 18  TV (machine): 450  FiO2: 40  PEEP: 8  ITime: 1  MAP: 13  PIP: 25    HEIGHT/WEIGHT/BMI:   Height (cm): 172.7 (07-27), 170.2 (06-01), 180.3 (10-04)  Weight (kg): 93.7 (07-27), 89.4 (07-17), 66.4 (10-04)  BMI (kg/m2): 31.4 (07-27), 30.9 (07-17), 22.9 (06-01), 20.4 (10-04)  08-01-23 @ 07:01  -  08-02-23 @ 07:00  --------------------------------------------------------  IN:  Total IN: 0 mL----------  ????   given but not recorded    OUT:    Incontinent per Collection Bag (mL): 50 mL    Rectal Tube (mL): 50 mL    Voided (mL): 500 mL  Total OUT: 600 mL    Total NET: -600 mL        MEDICATIONS:   acetaminophen     Tablet .. 650 milliGRAM(s) Oral every 6 hours PRN  aluminum hydroxide/magnesium hydroxide/simethicone Suspension 30 milliLiter(s) Oral every 4 hours PRN  aMIOdarone    Tablet 200 milliGRAM(s) Oral daily  ascorbic acid 500 milliGRAM(s) Oral daily  atorvastatin 80 milliGRAM(s) Oral at bedtime  chlorhexidine 0.12% Liquid 15 milliLiter(s) Oral Mucosa every 12 hours  chlorhexidine 2% Cloths 1 Application(s) Topical <User Schedule>  clonazePAM  Tablet 2 milliGRAM(s) Oral three times a day  collagenase Ointment 1 Application(s) Topical two times a day  collagenase Ointment 1 Application(s) Topical once  Dakins Solution - 1/2 Strength 1 Application(s) Topical every 12 hours  doxazosin 4 milliGRAM(s) Oral at bedtime  epoetin guanakito-epbx (RETACRIT) Injectable 52154 Unit(s) SubCutaneous every 7 days  ergocalciferol Drops 4000 Unit(s) Oral every 24 hours  folic acid 1 milliGRAM(s) Oral daily  insulin lispro (ADMELOG) corrective regimen sliding scale   SubCutaneous at bedtime  lacosamide IVPB 100 milliGRAM(s) IV Intermittent every 12 hours  melatonin 3 milliGRAM(s) Oral at bedtime PRN  metoclopramide Injectable 5 milliGRAM(s) IV Push three times a day  midodrine 10 milliGRAM(s) Oral every 8 hours  multivitamin 1 Tablet(s) Oral daily  pantoprazole  Injectable 40 milliGRAM(s) IV Push two times a day  polyethylene glycol 3350 17 Gram(s) Oral two times a day  senna 2 Tablet(s) Oral at bedtime  silver sulfADIAZINE 1% Cream 1 Application(s) Topical two times a day  valproate sodium  IVPB 750 milliGRAM(s) IV Intermittent every 8 hours    LABS:                         7.4    12.84 )-----------( 340      ( 02 Aug 2023 06:43 )             22.9     135  |  99  |  61<HH>  ----------------------------<  86          (08-02-23 @ 06:43)  3.5   |  27  |  2.9<H>    Ca    8.5          (08-02-23 @ 06:43)  Phos  2.8         (08-02-23 @ 06:43)  Mg     2.6         (08-02-23 @ 06:43)    TPro  5.2<L>  /  Alb  2.1<L>  /  TBili  0.3  /  DBili  x   /  AST  15  /  ALT  8   /  AlkPhos  130<H>       08-02-23 @ 06:43  Triglycerides, Serum: 37 mg/dL (07-27 @ 05:33)  Vitamin D, 25-Hydroxy: 42 ng/mL (08-01 @ 04:30)  Folate: >20.0 ng/mL (06-03 @ 06:50)  Vitamin B12, Serum: 1908 pg/mL (06-06 @ 10:40)  Zinc Level, Plasma: 54 ug/dL (06-03 @ 06:50)    Blood Glucose (Past 24 hours):  89 mg/dL (08-02 @ 11:02)  111 mg/dL (08-02 @ 08:01)  158 mg/dL (08-01 @ 21:13)  155 mg/dL (08-01 @ 18:09)    DIET:   Diet, NPO with Tube Feed:   Tube Feeding Modality: Gastrostomy  Peptamen A.F. Formula  Total Volume for 24 Hours (mL): 1500  Bolus  Total Volume of Bolus (mL):  375  Tube Feed Frequency: Every 6 hours   Tube Feed Start Time: 20:00  Bolus Feed Rate (mL per Hour): 500   Bolus Feed Duration (in Hours): 0.75  Free Water Flush Instructions:  50ml water by syringe push before and after each feed (08-01-23 @ 07:44) [Active]    RADIOLOGY:   < from: Xray Chest 1 View- PORTABLE-Routine (Xray Chest 1 View- PORTABLE-Routine in AM.) (08.02.23 @ 05:32) >  IMPRESSION:  Unchanged bilateral opacities/effusions.

## 2023-08-02 NOTE — CONSULT NOTE ADULT - PROBLEM SELECTOR RECOMMENDATION 9
vEEG showed interictal activity. S/p adjustment of shunt by neurosurgery on 7/31  -continue vEEG  -f/u neurology  -clonazepam per neurology  -continue vimpat, depakote  -MRI with CSF flow when medically able

## 2023-08-02 NOTE — PROGRESS NOTE ADULT - ASSESSMENT
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 chronically non-verbal , quadriplegia    multiple sacral wounds and buttock  hypokalemia, borderline phos    PLAN  repeat vit D wnl - would decrease dosing to 1000 IU/d  continue with  g-tube feed to peptamen AF at 375ml over 45 min x 4feeds/d  that is 1800kcal/114gm of protein - whey source, relatively lower % carb and lower K  as pt remains afebrile, add Charly via feeding tube twice daily x 2 weeks then re-evaluate wounds     offloading etc per wound care team

## 2023-08-02 NOTE — EEG REPORT - NS EEG TEXT BOX
Epilepsy Attending Note:     ALICIA BARBOUR    63y Male  MRN [id number]    Vital Signs Last 24 Hrs  T(C): 36.6 (02 Aug 2023 07:48), Max: 36.8 (02 Aug 2023 00:00)  T(F): 97.8 (02 Aug 2023 07:48), Max: 98.2 (02 Aug 2023 00:00)  HR: 91 (02 Aug 2023 08:03) (86 - 97)  BP: 93/52 (02 Aug 2023 07:48) (91/52 - 117/55)  BP(mean): 67 (02 Aug 2023 07:48) (67 - 79)  RR: 18 (02 Aug 2023 07:48) (18 - 20)  SpO2: 99% (02 Aug 2023 08:03) (98% - 100%)    Parameters below as of 02 Aug 2023 07:48  Patient On (Oxygen Delivery Method): ventilator                              7.4    12.84 )-----------( 340      ( 02 Aug 2023 06:43 )             22.9       08-02    135  |  99  |  61<HH>  ----------------------------<  86  3.5   |  27  |  2.9<H>    Ca    8.5      02 Aug 2023 06:43  Phos  2.8     08-02  Mg     2.6     08-02    TPro  5.2<L>  /  Alb  2.1<L>  /  TBili  0.3  /  DBili  x   /  AST  15  /  ALT  8   /  AlkPhos  130<H>  08-02      MEDICATIONS  (STANDING):  aMIOdarone    Tablet 200 milliGRAM(s) Oral daily  ascorbic acid 500 milliGRAM(s) Oral daily  atorvastatin 80 milliGRAM(s) Oral at bedtime  chlorhexidine 0.12% Liquid 15 milliLiter(s) Oral Mucosa every 12 hours  chlorhexidine 2% Cloths 1 Application(s) Topical <User Schedule>  clonazePAM  Tablet 1 milliGRAM(s) Oral three times a day  collagenase Ointment 1 Application(s) Topical two times a day  collagenase Ointment 1 Application(s) Topical once  Dakins Solution - 1/2 Strength 1 Application(s) Topical every 12 hours  dextrose 5%. 1000 milliLiter(s) (100 mL/Hr) IV Continuous <Continuous>  dextrose 5%. 1000 milliLiter(s) (50 mL/Hr) IV Continuous <Continuous>  dextrose 50% Injectable 12.5 Gram(s) IV Push once  dextrose 50% Injectable 25 Gram(s) IV Push once  dextrose 50% Injectable 25 Gram(s) IV Push once  doxazosin 4 milliGRAM(s) Oral at bedtime  epoetin guanakito-epbx (RETACRIT) Injectable 13960 Unit(s) SubCutaneous every 7 days  ergocalciferol Drops 4000 Unit(s) Oral every 24 hours  folic acid 1 milliGRAM(s) Oral daily  glucagon  Injectable 1 milliGRAM(s) IV Push once  glucagon  Injectable 1 milliGRAM(s) IntraMuscular once  insulin lispro (ADMELOG) corrective regimen sliding scale   SubCutaneous at bedtime  lacosamide IVPB 100 milliGRAM(s) IV Intermittent every 12 hours  metoclopramide Injectable 5 milliGRAM(s) IV Push three times a day  midodrine 10 milliGRAM(s) Oral every 8 hours  multivitamin 1 Tablet(s) Oral daily  pantoprazole  Injectable 40 milliGRAM(s) IV Push two times a day  polyethylene glycol 3350 17 Gram(s) Oral two times a day  senna 2 Tablet(s) Oral at bedtime  silver sulfADIAZINE 1% Cream 1 Application(s) Topical two times a day  valproate sodium  IVPB 750 milliGRAM(s) IV Intermittent every 8 hours    MEDICATIONS  (PRN):  acetaminophen     Tablet .. 650 milliGRAM(s) Oral every 6 hours PRN Temp greater or equal to 38C (100.4F), Mild Pain (1 - 3)  aluminum hydroxide/magnesium hydroxide/simethicone Suspension 30 milliLiter(s) Oral every 4 hours PRN Dyspepsia  dextrose Oral Gel 15 Gram(s) Oral once PRN Blood Glucose LESS THAN 70 milliGRAM(s)/deciliter  LORazepam   Injectable 2 milliGRAM(s) IV Push daily PRN seizure  melatonin 3 milliGRAM(s) Oral at bedtime PRN Insomnia      Valproic Acid Level, Serum: 35.0 ug/mL [50.0 - 100.0] (23 @ 06:43)  Valproic Acid Level, Serum: 32.0 ug/mL [50.0 - 100.0] (23 @ 04:30)  Valproic Acid Level, Serum: 22.0 ug/mL [50.0 - 100.0] (23 @ 19:53)        VEEG in the last 24 hours:    Background - continuous, asymmetrical with higher amplitude and breach artifact from the right side, lower amplitude and mildly suppressed background over the left hemisphere, reaching frequencies in the range of 4-5 Hz. Overall, showing relatively more reactivity compare to the day before.    Focal and generalized slowin. moderate to severe generalized slowing  2. moderate to severe right hemispheric focal slowing  3. independent mild to moderate left hemispheric focal slowing    Interictal activity - very frequent right hemispheric sharp waves and spikes that at times appear in periodic pattern    Events - none    Seizures - none    Impression: Abnormal VEEG as above    Plan - per neurology team

## 2023-08-02 NOTE — PROGRESS NOTE ADULT - SUBJECTIVE AND OBJECTIVE BOX
Over Night Events: events noted, vent dependant, V EEG, Neuro reviewed    PHYSICAL EXAM    ICU Vital Signs Last 24 Hrs  T(C): 36.6 (02 Aug 2023 07:48), Max: 36.8 (02 Aug 2023 00:00)  T(F): 97.8 (02 Aug 2023 07:48), Max: 98.2 (02 Aug 2023 00:00)  HR: 91 (02 Aug 2023 08:03) (86 - 97)  BP: 93/52 (02 Aug 2023 07:48) (91/52 - 117/55)  BP(mean): 67 (02 Aug 2023 07:48) (67 - 79)  RR: 18 (02 Aug 2023 07:48) (18 - 20)  SpO2: 99% (02 Aug 2023 08:03) (98% - 100%)    O2 Parameters below as of 02 Aug 2023 07:48  Patient On (Oxygen Delivery Method): ventilator            General: ill looking  Lungs: dec bs both bases  Cardiovascular: Regular   Abdomen: Soft, Positive BS  unresponsive  sacral ulcer      08-01-23 @ 07:01  -  08-02-23 @ 07:00  --------------------------------------------------------  IN:  Total IN: 0 mL    OUT:    Incontinent per Collection Bag (mL): 50 mL    Rectal Tube (mL): 50 mL    Voided (mL): 500 mL  Total OUT: 600 mL    Total NET: -600 mL          LABS:                          7.4    12.84 )-----------( 340      ( 02 Aug 2023 06:43 )             22.9                                               08-02    135  |  99  |  61<HH>  ----------------------------<  86  3.5   |  27  |  2.9<H>    Ca    8.5      02 Aug 2023 06:43  Phos  2.8     08-02  Mg     2.6     08-02    TPro  5.2<L>  /  Alb  2.1<L>  /  TBili  0.3  /  DBili  x   /  AST  15  /  ALT  8   /  AlkPhos  130<H>  08-02                                             Urinalysis Basic - ( 02 Aug 2023 06:43 )    Color: x / Appearance: x / SG: x / pH: x  Gluc: 86 mg/dL / Ketone: x  / Bili: x / Urobili: x   Blood: x / Protein: x / Nitrite: x   Leuk Esterase: x / RBC: x / WBC x   Sq Epi: x / Non Sq Epi: x / Bacteria: x                                                  LIVER FUNCTIONS - ( 02 Aug 2023 06:43 )  Alb: 2.1 g/dL / Pro: 5.2 g/dL / ALK PHOS: 130 U/L / ALT: 8 U/L / AST: 15 U/L / GGT: x                                                                                               Mode: AC/ CMV (Assist Control/ Continuous Mandatory Ventilation)  RR (machine): 18  TV (machine): 450  FiO2: 40  PEEP: 8  ITime: 1  MAP: 14  PIP: 27                                          MEDICATIONS  (STANDING):  aMIOdarone    Tablet 200 milliGRAM(s) Oral daily  ascorbic acid 500 milliGRAM(s) Oral daily  atorvastatin 80 milliGRAM(s) Oral at bedtime  chlorhexidine 0.12% Liquid 15 milliLiter(s) Oral Mucosa every 12 hours  chlorhexidine 2% Cloths 1 Application(s) Topical <User Schedule>  clonazePAM  Tablet 1 milliGRAM(s) Oral three times a day  collagenase Ointment 1 Application(s) Topical once  collagenase Ointment 1 Application(s) Topical two times a day  Dakins Solution - 1/2 Strength 1 Application(s) Topical every 12 hours  dextrose 5%. 1000 milliLiter(s) (50 mL/Hr) IV Continuous <Continuous>  dextrose 5%. 1000 milliLiter(s) (100 mL/Hr) IV Continuous <Continuous>  dextrose 50% Injectable 25 Gram(s) IV Push once  dextrose 50% Injectable 12.5 Gram(s) IV Push once  dextrose 50% Injectable 25 Gram(s) IV Push once  doxazosin 4 milliGRAM(s) Oral at bedtime  epoetin guanakito-epbx (RETACRIT) Injectable 45410 Unit(s) SubCutaneous every 7 days  ergocalciferol Drops 4000 Unit(s) Oral every 24 hours  folic acid 1 milliGRAM(s) Oral daily  glucagon  Injectable 1 milliGRAM(s) IV Push once  glucagon  Injectable 1 milliGRAM(s) IntraMuscular once  insulin lispro (ADMELOG) corrective regimen sliding scale   SubCutaneous at bedtime  lacosamide IVPB 100 milliGRAM(s) IV Intermittent every 12 hours  metoclopramide Injectable 5 milliGRAM(s) IV Push three times a day  midodrine 10 milliGRAM(s) Oral every 8 hours  multivitamin 1 Tablet(s) Oral daily  pantoprazole  Injectable 40 milliGRAM(s) IV Push two times a day  polyethylene glycol 3350 17 Gram(s) Oral two times a day  senna 2 Tablet(s) Oral at bedtime  silver sulfADIAZINE 1% Cream 1 Application(s) Topical two times a day  valproate sodium  IVPB 750 milliGRAM(s) IV Intermittent every 8 hours    MEDICATIONS  (PRN):  acetaminophen     Tablet .. 650 milliGRAM(s) Oral every 6 hours PRN Temp greater or equal to 38C (100.4F), Mild Pain (1 - 3)  aluminum hydroxide/magnesium hydroxide/simethicone Suspension 30 milliLiter(s) Oral every 4 hours PRN Dyspepsia  dextrose Oral Gel 15 Gram(s) Oral once PRN Blood Glucose LESS THAN 70 milliGRAM(s)/deciliter  LORazepam   Injectable 2 milliGRAM(s) IV Push daily PRN seizure  melatonin 3 milliGRAM(s) Oral at bedtime PRN Insomnia

## 2023-08-02 NOTE — PROGRESS NOTE ADULT - SUBJECTIVE AND OBJECTIVE BOX
seen and examined  24 h events noted   trach/vent   VEEG         PAST HISTORY  --------------------------------------------------------------------------------  No significant changes to PMH, PSH, FHx, SHx, unless otherwise noted    ALLERGIES & MEDICATIONS  --------------------------------------------------------------------------------  Allergies    penicillin (Other)    Intolerances      Standing Inpatient Medications  aMIOdarone    Tablet 200 milliGRAM(s) Oral daily  ascorbic acid 500 milliGRAM(s) Oral daily  atorvastatin 80 milliGRAM(s) Oral at bedtime  chlorhexidine 0.12% Liquid 15 milliLiter(s) Oral Mucosa every 12 hours  chlorhexidine 2% Cloths 1 Application(s) Topical <User Schedule>  clonazePAM  Tablet 1 milliGRAM(s) Oral three times a day  collagenase Ointment 1 Application(s) Topical two times a day  collagenase Ointment 1 Application(s) Topical once  Dakins Solution - 1/2 Strength 1 Application(s) Topical every 12 hours  dextrose 5%. 1000 milliLiter(s) IV Continuous <Continuous>  dextrose 5%. 1000 milliLiter(s) IV Continuous <Continuous>  dextrose 50% Injectable 25 Gram(s) IV Push once  dextrose 50% Injectable 12.5 Gram(s) IV Push once  dextrose 50% Injectable 25 Gram(s) IV Push once  doxazosin 4 milliGRAM(s) Oral at bedtime  epoetin guanakito-epbx (RETACRIT) Injectable 59647 Unit(s) SubCutaneous every 7 days  ergocalciferol Drops 4000 Unit(s) Oral every 24 hours  ferrous    sulfate Liquid 300 milliGRAM(s) Enteral Tube daily  folic acid 1 milliGRAM(s) Oral daily  glucagon  Injectable 1 milliGRAM(s) IV Push once  glucagon  Injectable 1 milliGRAM(s) IntraMuscular once  insulin lispro (ADMELOG) corrective regimen sliding scale   SubCutaneous at bedtime  lacosamide IVPB 100 milliGRAM(s) IV Intermittent every 12 hours  metoclopramide Injectable 5 milliGRAM(s) IV Push three times a day  midodrine 10 milliGRAM(s) Oral every 8 hours  multivitamin 1 Tablet(s) Oral daily  pantoprazole  Injectable 40 milliGRAM(s) IV Push two times a day  polyethylene glycol 3350 17 Gram(s) Oral two times a day  senna 2 Tablet(s) Oral at bedtime  silver sulfADIAZINE 1% Cream 1 Application(s) Topical two times a day  valproate sodium  IVPB 750 milliGRAM(s) IV Intermittent every 8 hours    PRN Inpatient Medications  acetaminophen     Tablet .. 650 milliGRAM(s) Oral every 6 hours PRN  aluminum hydroxide/magnesium hydroxide/simethicone Suspension 30 milliLiter(s) Oral every 4 hours PRN  dextrose Oral Gel 15 Gram(s) Oral once PRN  LORazepam   Injectable 2 milliGRAM(s) IV Push daily PRN  melatonin 3 milliGRAM(s) Oral at bedtime PRN          VITALS/PHYSICAL EXAM  --------------------------------------------------------------------------------  T(C): 36.6 (08-02-23 @ 05:00), Max: 36.8 (08-02-23 @ 00:00)  HR: 87 (08-02-23 @ 05:00) (87 - 97)  BP: 91/52 (08-02-23 @ 05:00) (91/52 - 117/55)  RR: 18 (08-02-23 @ 05:00) (18 - 20)  SpO2: 100% (08-02-23 @ 05:00) (97% - 100%)  Wt(kg): --        07-31-23 @ 07:01  -  08-01-23 @ 07:00  --------------------------------------------------------  IN: 950 mL / OUT: 1115 mL / NET: -165 mL    08-01-23 @ 07:01  -  08-02-23 @ 05:56  --------------------------------------------------------  IN: 0 mL / OUT: 600 mL / NET: -600 mL      Physical Exam:  	Gen:trach/vent  	VEEG  	LE:  edema  	Vascular access:TDC     LABS/STUDIES  --------------------------------------------------------------------------------              8.2    10.56 >-----------<  209      [08-01-23 @ 04:30]              26.1     136  |  99  |  53  ----------------------------<  127      [08-01-23 @ 04:30]  3.8   |  22  |  2.6        Ca     8.5     [08-01-23 @ 04:30]      Mg     2.3     [08-01-23 @ 04:30]      Phos  3.0     [08-01-23 @ 04:30]    TPro  5.5  /  Alb  2.2  /  TBili  0.3  /  DBili  x   /  AST  20  /  ALT  9   /  AlkPhos  143  [08-01-23 @ 04:30]      Creatinine Trend:  SCr 2.6 [08-01 @ 04:30]  SCr 3.5 [07-31 @ 05:54]  SCr 3.0 [07-30 @ 05:50]  SCr 2.6 [07-29 @ 04:30]  SCr 3.7 [07-28 @ 05:31]    Urinalysis - [08-01-23 @ 04:30]      Color  / Appearance  / SG  / pH       Gluc 127 / Ketone   / Bili  / Urobili        Blood  / Protein  / Leuk Est  / Nitrite       RBC  / WBC  / Hyaline  / Gran  / Sq Epi  / Non Sq Epi  / Bacteria       Iron 49, TIBC 138, %sat 36      [07-28-23 @ 05:31]  Ferritin 1199      [07-28-23 @ 05:31]  PTH -- (Ca 8.4)      [08-01-23 @ 04:30]   18  PTH -- (Ca 8.9)      [07-28-23 @ 05:31]   20  Vitamin D (25OH) 42      [08-01-23 @ 04:30]  TSH 5.37      [07-27-23 @ 05:33]  Lipid: chol 72, TG 37, HDL 42, LDL --      [07-27-23 @ 05:33]    HBsAb Nonreact      [07-14-23 @ 10:53]  HBsAg Nonreact      [07-11-23 @ 16:58]  HCV 0.16, Nonreact      [07-11-23 @ 16:58]

## 2023-08-02 NOTE — PROGRESS NOTE ADULT - ASSESSMENT
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  was found also to be hypoglycemic with blood sugar of 10 and had two episodes of tonic clonic seizures in the ED.   Patient non-verbal at baseline . s/p MICU monitoring, now downgraded to SDU    Hypoglycemia possibly due to sulfonyl urea  DM II  - Blood sugar in ed: 10 s/p multiple dextrose pushes /d10  - off standing insulin, on ISS  - monitor FS, avoid sulfonylurea    GTC seizure x2.  status epilepticus   large posterior fossa IPH w/ IVH/SAH/hydrocephalus  Bedbound/Nonverbal at baseline/ Chronical respiratory failure s/p trach/Peg  - Patient s/p Versed 4mg x1, Keppra 1gram IV x1, Ativan 4mg IV x2, with Propofol gtt initiated for seizures   - video EEG: Focal and generalized slowing, Interictal activity, runs of bilateral FP ( higher amplitude from the right) rhythmic activity that appears not be physiological . ( ? of artifact). It appears as questionable low amplitude spike and aftergoing slow with modified morphology( ? possibility of change due the the prior procedure)  - neurocrit and neurosurgery following   s/p vpa bolus  -Neurology f/u appreciated  c/w VEEG  - check free Valproate level  - Clonazepam 2  mg tid ( increased from 1 mg tid   - c/w Vimpat 100 mg bid  - c/w Depakote 750 mg tid  - shunt adjusted by neurosx 7/31, repeat CTH 8/1 reported ( there has been slightly decreased overall ventricular dilation with right parietal ventriculoperitoneal shunt   catheterin appropriate position.  Unchanged thin right frontal convexity subdural collection with minimal leftward midline shift as well as slitlike appearance of the lateral   ventricles with right parietal ventriculoperitoneal shunt in appropriate position. Redemonstration of a wedge-shaped hypodensity involving the left   occipital lobe which may reflect age-indeterminate infarct.)  -As per Neurosurgery-  will need MRI with CSF flow study to evaluate the aqueductal hydrocephalus once improved   - vent management per pulm     ESRD on Dialysis Monday/THursday   Acute Renal Failure, started on HD 6/1/23  Anemia of chronic disease  - daily BMP monitoring, daily weight, daily strict I/O   - s/p Tesio cath on 7/7/23  - HD per renal   - s/p 1u PRBC since admission, monitor CBC/Keep active T&S    Afib w/ RVR  -now rate controlled, on amiodarone, off AC due to brain bleed    Multiple sacral wounds and buttock   - burn team following, Wound care - Santyl/ Moist Kerlex packing with 1/4 Dakins / DPD BID to sacrum and left ischium  Silvadene/DPD BID to Rt Ischium, no surgical intervention   frequent turning, off loading,and positioning and skin care as per protocol, Maintain pressure injury prevention, Keep skin clean, Offload heels, Monitor wound for changes and notify provider if any   - nutrition support on board     DNR, overall prognosis is very poor - palliative team consult

## 2023-08-02 NOTE — PROGRESS NOTE ADULT - ASSESSMENT
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    ESRD on HD   Biweekly schedule on Monday/ Thursday    hd in am    BP on the low side continue midodrine   h/h noted on retacrit weekly, d/c feso4 / ferritin elevated   ph at goal / no binders   Neurology f/up appreciated   will follow

## 2023-08-02 NOTE — PROGRESS NOTE ADULT - ASSESSMENT
63-year-old male with multiple comorbidities, bedridden at baseline, trach/PEG, AFib, CKD on HD, IPH w/ IVH/SAH/hydrocephalus, s/p  shunt in 2021, CAD  admitted w SE and VEEG showing R sided PLEDs on VPA 2250/day and /day, clinically - critically ill, on MT and with L sided jerking movements mostly concurrent w R-sided PLEDs on VEEG indicates severe focal cerebral damage and prognosis guarded.  At this point needs seizure control: seems his jerking movements time locked and synchronous w periodic sharps on VEEG, w/o improvement after VPA 2 g.      Recommendations:     - c/w VEEG  - check free Valproate level  - Clonazepam 2  mg tid  - c/w Vimpat 100 mg bid  - c/w Depakote 750 mg tid  - rest of tx per primary team  - neurology will follow       The case was discussed w Dr. Santos

## 2023-08-03 NOTE — PROGRESS NOTE ADULT - CONVERSATION DETAILS
Called and spoke with Patricio over the phone. Palliative care reintroduced.  She was able to provide a medical update and hospital course.   We discussed overall GOC. She noted she had received updates from the primary team, but had not received any information about prognosis or what to expect from the team. We discussed the patient's overall poor prognosis and discussed comfort measures only at length. She wishes for ongoing medical management and a conversation about prognosis with the primary team. All questions answered.

## 2023-08-03 NOTE — PROGRESS NOTE ADULT - SUBJECTIVE AND OBJECTIVE BOX
Neurology Progress Note    Interval History:    Patient was seen and examined, had HD session, jerking mvmnts less, VEEG still showing R PLEDS, palliative on board.     Medications:  acetaminophen     Tablet .. 650 milliGRAM(s) Oral every 6 hours PRN  aluminum hydroxide/magnesium hydroxide/simethicone Suspension 30 milliLiter(s) Oral every 4 hours PRN  aMIOdarone    Tablet 200 milliGRAM(s) Oral daily  ascorbic acid 500 milliGRAM(s) Oral daily  atorvastatin 80 milliGRAM(s) Oral at bedtime  chlorhexidine 0.12% Liquid 15 milliLiter(s) Oral Mucosa every 12 hours  chlorhexidine 2% Cloths 1 Application(s) Topical <User Schedule>  clonazePAM  Tablet 2 milliGRAM(s) Oral three times a day  collagenase Ointment 1 Application(s) Topical once  collagenase Ointment 1 Application(s) Topical two times a day  Dakins Solution - 1/2 Strength 1 Application(s) Topical every 12 hours  dextrose 5%. 1000 milliLiter(s) IV Continuous <Continuous>  dextrose 5%. 1000 milliLiter(s) IV Continuous <Continuous>  dextrose 50% Injectable 25 Gram(s) IV Push once  dextrose 50% Injectable 12.5 Gram(s) IV Push once  dextrose 50% Injectable 25 Gram(s) IV Push once  dextrose Oral Gel 15 Gram(s) Oral once PRN  epoetin guanakito-epbx (RETACRIT) Injectable 47550 Unit(s) SubCutaneous every 7 days  ergocalciferol Drops 1000 Unit(s) Oral daily  folic acid 1 milliGRAM(s) Oral daily  glucagon  Injectable 1 milliGRAM(s) IV Push once  glucagon  Injectable 1 milliGRAM(s) IntraMuscular once  insulin lispro (ADMELOG) corrective regimen sliding scale   SubCutaneous at bedtime  lacosamide IVPB 100 milliGRAM(s) IV Intermittent every 12 hours  melatonin 3 milliGRAM(s) Oral at bedtime PRN  metoclopramide Injectable 5 milliGRAM(s) IV Push three times a day  midodrine 10 milliGRAM(s) Oral every 8 hours  multivitamin 1 Tablet(s) Oral daily  pantoprazole  Injectable 40 milliGRAM(s) IV Push two times a day  polyethylene glycol 3350 17 Gram(s) Oral two times a day  potassium chloride   Powder 40 milliEquivalent(s) Oral once  senna 2 Tablet(s) Oral at bedtime  silver sulfADIAZINE 1% Cream 1 Application(s) Topical two times a day  tamsulosin 0.4 milliGRAM(s) Oral at bedtime  valproate sodium  IVPB 750 milliGRAM(s) IV Intermittent every 8 hours      Vital Signs Last 24 Hrs  T(C): 35.9 (03 Aug 2023 07:53), Max: 35.9 (03 Aug 2023 07:53)  T(F): 96.7 (03 Aug 2023 07:53), Max: 96.7 (03 Aug 2023 07:53)  HR: 88 (03 Aug 2023 14:00) (72 - 94)  BP: 102/48 (03 Aug 2023 12:50) (90/48 - 120/58)  BP(mean): 65 (03 Aug 2023 07:53) (65 - 83)  RR: 20 (03 Aug 2023 12:50) (18 - 20)  SpO2: 100% (03 Aug 2023 14:00) (99% - 100%)    Parameters below as of 03 Aug 2023 12:50  Patient On (Oxygen Delivery Method): ventilator        NEUROLOGICAL EXAMINATION:  Appears ill, on Mechanical vent (trach to vent), no response to verbal or noxious stimuli, semirhythmic jerking of his L arm in dystonic posture.   Pupils: non-reactive maybe some flickering on direct light, eyes with horizontal jerking movements, no corneal b/l. No oculocephalic, no gag reflex.  General tone of muscle - with tonic on L side, no movements, no signs of withdrawal. DTR abs throughout.       Labs:  CBC Full  -  ( 03 Aug 2023 06:21 )  WBC Count : 14.06 K/uL  RBC Count : 2.51 M/uL  Hemoglobin : 7.6 g/dL  Hematocrit : 23.5 %  Platelet Count - Automated : 265 K/uL  Mean Cell Volume : 93.6 fL  Mean Cell Hemoglobin : 30.3 pg  Mean Cell Hemoglobin Concentration : 32.3 g/dL     08-03    135  |  97<L>  |  68<HH>  ----------------------------<  94  3.4<L>   |  22  |  3.1<H>    Ca    8.1<L>      03 Aug 2023 06:21  Phos  2.8     08-03  Mg     2.5     08-03    TPro  4.8<L>  /  Alb  1.8<L>  /  TBili  0.3  /  DBili  x   /  AST  16  /  ALT  7   /  AlkPhos  117<H>  08-03    LIVER FUNCTIONS - ( 03 Aug 2023 06:21 )  Alb: 1.8 g/dL / Pro: 4.8 g/dL / ALK PHOS: 117 U/L / ALT: 7 U/L / AST: 16 U/L / GGT: x             Urinalysis Basic - ( 03 Aug 2023 06:21 )    Color: x / Appearance: x / SG: x / pH: x  Gluc: 94 mg/dL / Ketone: x  / Bili: x / Urobili: x   Blood: x / Protein: x / Nitrite: x   Leuk Esterase: x / RBC: x / WBC x   Sq Epi: x / Non Sq Epi: x / Bacteria: x        RADIOLOGY & ADDITIONAL TESTS:

## 2023-08-03 NOTE — PROGRESS NOTE ADULT - SUBJECTIVE AND OBJECTIVE BOX
T H I S   I S    N O  T   A    F I N A L I Z E D   N O T ALICIA DENTON  63y, Male  Allergy: penicillin (Other)    Hospital Day: 8d    Patient seen and examined earlier today.     PMH/PSH:  PAST MEDICAL & SURGICAL HISTORY:  HTN (hypertension)      Diabetes mellitus      H/O intracranial hemorrhage      H/O tracheostomy      PEG (percutaneous endoscopic gastrostomy) status      Hyperlipidemia          LAST 24-Hr EVENTS:    VITALS:  T(F): 96.7 (08-03-23 @ 07:53), Max: 96.7 (08-03-23 @ 07:53)  HR: 72 (08-03-23 @ 09:50)  BP: 101/56 (08-03-23 @ 09:50) (90/48 - 120/58)  RR: 18 (08-03-23 @ 09:50)  SpO2: 100% (08-03-23 @ 09:50)  FiO2: 40        TESTS & MEASUREMENTS:  Weight/BMI      08-01-23 @ 07:01  -  08-02-23 @ 07:00  --------------------------------------------------------  IN: 0 mL / OUT: 600 mL / NET: -600 mL    08-02-23 @ 07:01  -  08-03-23 @ 07:00  --------------------------------------------------------  IN: 0 mL / OUT: 400 mL / NET: -400 mL                            7.6    14.06 )-----------( 265      ( 03 Aug 2023 06:21 )             23.5         08-03    135  |  97<L>  |  68<HH>  ----------------------------<  94  3.4<L>   |  22  |  3.1<H>    Ca    8.1<L>      03 Aug 2023 06:21  Phos  2.8     08-03  Mg     2.5     08-03    TPro  4.8<L>  /  Alb  1.8<L>  /  TBili  0.3  /  DBili  x   /  AST  16  /  ALT  7   /  AlkPhos  117<H>  08-03    LIVER FUNCTIONS - ( 03 Aug 2023 06:21 )  Alb: 1.8 g/dL / Pro: 4.8 g/dL / ALK PHOS: 117 U/L / ALT: 7 U/L / AST: 16 U/L / GGT: x                 Urinalysis Basic - ( 03 Aug 2023 06:21 )    Color: x / Appearance: x / SG: x / pH: x  Gluc: 94 mg/dL / Ketone: x  / Bili: x / Urobili: x   Blood: x / Protein: x / Nitrite: x   Leuk Esterase: x / RBC: x / WBC x   Sq Epi: x / Non Sq Epi: x / Bacteria: x      Procalcitonin, Serum: 0.47 ng/mL (07-27-23 @ 05:33)      Ferritin: 1199 ng/mL (07-28-23 @ 05:31)            A1C with Estimated Average Glucose Result: 5.7 % (07-27-23 @ 05:33)  A1C with Estimated Average Glucose Result: 6.2 % (05-31-23 @ 05:50)          RADIOLOGY, ECG, & ADDITIONAL TESTS:  12 Lead ECG:   Ventricular Rate 84 BPM    Atrial Rate 84 BPM    P-R Interval 108 ms    QRS Duration 90 ms    Q-T Interval 356 ms    QTC Calculation(Bazett) 420 ms    R Axis 22 degrees    T Axis 16 degrees    Diagnosis Line Sinus rhythm with short [place]  Otherwise normal ECG    Confirmed by MACIEL BORREGO MD ([id number]) on 7/12/2023 6:50:09 AM (07-12-23 @ 02:18)    CT Head No Cont:   ACC: [id number] EXAM:  CT BRAIN   ORDERED BY: EDYTA RICHARDS     PROCEDURE DATE:  08/01/2023          INTERPRETATION:  CLINICAL INDICATION: Ventricular peritoneal shunt   reprogramming.    Technique: CT of the head was performed without contrast.    Multiple contiguous axial images were acquired from the skullbase to the   vertex without the administration of intravenous contrast.  Coronal and   sagittal reformations were made.    COMPARISON:  prior head CT dated 7/30/2023.    FINDINGS:      EEG leads are noted overlying scalp causing streak artifact limiting   evaluation..    Since the prior examination there has been overall decreased lateral and   third ventricular dilation.    Patient is again noted to be status post suboccipital craniectomy with   essentially unchanged fluid collection noted at the craniectomy site, 4.4   x 1.4 cm. Unchanged hyperdensity anterior to the fluid collection at the   craniectomy site. Stable positioning of a right parietal approach   ventricular peritoneal shunt catheter, terminating near the foramen of   Monro. The lateral ventricles are slitlike in size, unchanged from prior.   There is similar ex vacuo dilation of the fourth ventricle and cerebral   aqueduct    Unchanged hypodense subdural collection along the right frontal convexity   measuring approximately 7 mm in thickness with approximately 3 mm right   to left midline shift.    Redemonstrated hypodensity in the left occipital lobe is without change.   Unchanged left high parietal hypodensity, likely reflecting chronic   infarct though new since the remote examinations.    Stable mucosal thickening in the right sphenoid sinus. Mild mucosal   thickening in the partially visualized left maxillary sinus. Complete   opacification of thebilateral mastoid air cells, unchanged. The   visualized orbits are unremarkable.      IMPRESSION:  Since the prior examination there has been slightly decreased overall   ventricular dilation with right parietal ventriculoperitoneal shunt   catheterin appropriate position.    Unchanged thin right frontal convexity subdural collection with minimal   leftward midline shift as well as slitlike appearance of the lateral   ventricles with right parietal ventriculoperitoneal shunt in appropriate   position.    Redemonstration of a wedge-shaped hypodensity involving the left   occipital lobe which may reflect age-indeterminate infarct.      --- End of Report ---            JANIE MCMILLAN MD; Attending Radiologist  This document has been electronically signed. Aug  1 2023  5:47PM (08-01-23 @ 16:39)  CT Head No Cont:   ACC: [id number] EXAM:  CT BRAIN   ORDERED BY: MELITON METZGER     PROCEDURE DATE:  07/30/2023          INTERPRETATION:  CLINICAL INDICATION: Follow-up shunt    TECHNIQUE: CT of the head was performed without the administration of   intravenous contrast.    COMPARISON: CT head 7/29/2023    FINDINGS:    Evaluation is somewhat limited by streak artifact from EEG leads.    Ventriculostomy catheter is seen traversing the right parietal region   with tip terminating in the right lateral ventricle, stable in position.   No evidence of pericatheter hemorrhage. There is slight further   enlargement of the supratentorial ventricles since the prior CT head from   7/29/2023. Stable dilatation of the fourth ventricle and the cerebral   aqueduct.    Stable volume of the thin subdural hemorrhages along the right convexity.   Nearly resolved subdural hemorrhage along the left convexity. Stable   minimal leftward midline shift.    Stable suboccipital craniectomy and evolving bilateral cerebellar   encephalomalacia. 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   Stable hyperdensity anterior to the CSF collection, unchanged since the   prior CT from 11/4/2021.    Stable left occipital patchy hypodensity, likely representing subacute   infarct.    There is no evidence of new acute territorial infarct or intracranial   hemorrhage.    The visualized intraorbital contents are normal. Partial opacification of   the right sphenoid sinus. Mild mucosal thickening of the left sphenoid   sinus. Complete opacification of the bilateral mastoid and middle ear   cavities.    IMPRESSION:    Slight further enlargement of the supratentorial ventricles since the   prior CT head from 7/29/2023. This could reflect reexpansion of the   ventricles versus mild hydrocephalus. Stable positioning of the right   parietal approach ventriculostomy catheter.    Unchanged thin subdural hemorrhage along the right convexity and minimal   midline shift to the left.    Stable suboccipital craniectomy and small pseudomeningocele.    Redemonstrated small left occipital patchy hyperdensity which could   reflect subacute infarct.    --- End of Report ---          JAZMÍN RICE MD; Resident Radiologist  This document has been electronically signed.  PALOMO WILKINSON MD; Attending Radiologist  This document has been electronically signed. Jul 31 2023  2:49PM (07-30-23 @ 15:58)    RECENT DIAGNOSTIC ORDERS:  Valproic Acid, Free: AM Sched. Collection: 04-Aug-2023 04:30 (08-03-23 @ 11:05)  Basic Metabolic Panel: 20:00 (08-03-23 @ 09:21)  Phosphorus: 16:00 (08-03-23 @ 08:18)  Diet, NPO with Tube Feed:   Tube Feeding Modality: Gastrostomy  Peptamen A.F. Formula  Total Volume for 24 Hours (mL): 1500  Bolus  Total Volume of Bolus (mL):  375  Tube Feed Frequency: Every 6 hours   Tube Feed Start Time: 20:00  Bolus Feed Rate (mL per Hour): 500   Bolus Feed Duration (in Hours): 0.75  Free Water Flush Instructions:  50ml water by syringe push before and after each feed  Charly(7 Gm Arginine/7 Gm Glut/1.2 Gm HMB     Qty per Day:  2 (08-02-23 @ 18:15)  EEG w/ Video Each 12-26 Hours, Unmonitored (08-02-23 @ 14:17)      MEDICATIONS:  MEDICATIONS  (STANDING):  aMIOdarone    Tablet 200 milliGRAM(s) Oral daily  ascorbic acid 500 milliGRAM(s) Oral daily  atorvastatin 80 milliGRAM(s) Oral at bedtime  chlorhexidine 0.12% Liquid 15 milliLiter(s) Oral Mucosa every 12 hours  chlorhexidine 2% Cloths 1 Application(s) Topical <User Schedule>  clonazePAM  Tablet 2 milliGRAM(s) Oral three times a day  collagenase Ointment 1 Application(s) Topical once  collagenase Ointment 1 Application(s) Topical two times a day  Dakins Solution - 1/2 Strength 1 Application(s) Topical every 12 hours  dextrose 5%. 1000 milliLiter(s) (50 mL/Hr) IV Continuous <Continuous>  dextrose 5%. 1000 milliLiter(s) (100 mL/Hr) IV Continuous <Continuous>  dextrose 50% Injectable 25 Gram(s) IV Push once  dextrose 50% Injectable 12.5 Gram(s) IV Push once  dextrose 50% Injectable 25 Gram(s) IV Push once  epoetin guanakito-epbx (RETACRIT) Injectable 86996 Unit(s) SubCutaneous every 7 days  ergocalciferol Drops 1000 Unit(s) Oral daily  folic acid 1 milliGRAM(s) Oral daily  glucagon  Injectable 1 milliGRAM(s) IV Push once  glucagon  Injectable 1 milliGRAM(s) IntraMuscular once  insulin lispro (ADMELOG) corrective regimen sliding scale   SubCutaneous at bedtime  lacosamide IVPB 100 milliGRAM(s) IV Intermittent every 12 hours  metoclopramide Injectable 5 milliGRAM(s) IV Push three times a day  midodrine 10 milliGRAM(s) Oral every 8 hours  multivitamin 1 Tablet(s) Oral daily  pantoprazole  Injectable 40 milliGRAM(s) IV Push two times a day  polyethylene glycol 3350 17 Gram(s) Oral two times a day  potassium chloride   Powder 40 milliEquivalent(s) Oral once  senna 2 Tablet(s) Oral at bedtime  silver sulfADIAZINE 1% Cream 1 Application(s) Topical two times a day  tamsulosin 0.4 milliGRAM(s) Oral at bedtime  valproate sodium  IVPB 750 milliGRAM(s) IV Intermittent every 8 hours    MEDICATIONS  (PRN):  acetaminophen     Tablet .. 650 milliGRAM(s) Oral every 6 hours PRN Temp greater or equal to 38C (100.4F), Mild Pain (1 - 3)  aluminum hydroxide/magnesium hydroxide/simethicone Suspension 30 milliLiter(s) Oral every 4 hours PRN Dyspepsia  dextrose Oral Gel 15 Gram(s) Oral once PRN Blood Glucose LESS THAN 70 milliGRAM(s)/deciliter  melatonin 3 milliGRAM(s) Oral at bedtime PRN Insomnia      HOME MEDICATIONS:  acetaminophen 325 mg oral tablet (07-27)  albuterol 2.5 mg/3 mL (0.083%) inhalation solution (07-27)  Amaryl 1 mg oral tablet (07-27)  amiodarone 200 mg oral tablet (07-27)  ascorbic acid 500 mg oral tablet (07-27)  aspirin 81 mg oral tablet, chewable (07-27)  atorvastatin 80 mg oral tablet (07-27)  Atrovent 18 mcg/inh inhalation aerosol (07-27)  bumetanide 2 mg oral tablet (07-27)  chlorhexidine 0.12% mucous membrane liquid (07-27)  doxazosin 4 mg oral tablet (07-27)  ergocalciferol 200 mcg/mL (8000 intl units/mL) oral solution (07-27)  ferrous sulfate 220 mg/5 mL (44 mg/5 mL elemental iron) oral elixir (07-27)  folic acid 1 mg oral tablet (07-27)  Keppra 100 mg/mL oral solution (07-27)  Multiple Vitamins oral tablet (07-27)  pantoprazole 40 mg oral delayed release tablet (07-27)  polyethylene glycol 3350 oral powder for reconstitution (07-27)  Retacrit 40,000 units/mL preservative-free injectable solution (07-27)  SSD 1% topical cream (07-27)  Venofer 20 mg/mL intravenous solution (07-27)  zinc sulfate 220 mg oral tablet (07-27)      PHYSICAL EXAM:  GENERAL:   CHEST/LUNG:   HEART:   ABDOMEN:   EXTREMITIES:               ALICIA BARBOUR  63y, Male  Allergy: penicillin (Other)    Hospital Day: 8d    Patient seen and examined earlier today. the patient is non verbal     PMH/PSH:  PAST MEDICAL & SURGICAL HISTORY:  HTN (hypertension)      Diabetes mellitus      H/O intracranial hemorrhage      H/O tracheostomy      PEG (percutaneous endoscopic gastrostomy) status      Hyperlipidemia          LAST 24-Hr EVENTS:    VITALS:  T(F): 96.7 (08-03-23 @ 07:53), Max: 96.7 (08-03-23 @ 07:53)  HR: 72 (08-03-23 @ 09:50)  BP: 101/56 (08-03-23 @ 09:50) (90/48 - 120/58)  RR: 18 (08-03-23 @ 09:50)  SpO2: 100% (08-03-23 @ 09:50)  FiO2: 40        TESTS & MEASUREMENTS:  Weight/BMI      08-01-23 @ 07:01  -  08-02-23 @ 07:00  --------------------------------------------------------  IN: 0 mL / OUT: 600 mL / NET: -600 mL    08-02-23 @ 07:01  -  08-03-23 @ 07:00  --------------------------------------------------------  IN: 0 mL / OUT: 400 mL / NET: -400 mL                            7.6    14.06 )-----------( 265      ( 03 Aug 2023 06:21 )             23.5         08-03    135  |  97<L>  |  68<HH>  ----------------------------<  94  3.4<L>   |  22  |  3.1<H>    Ca    8.1<L>      03 Aug 2023 06:21  Phos  2.8     08-03  Mg     2.5     08-03    TPro  4.8<L>  /  Alb  1.8<L>  /  TBili  0.3  /  DBili  x   /  AST  16  /  ALT  7   /  AlkPhos  117<H>  08-03    LIVER FUNCTIONS - ( 03 Aug 2023 06:21 )  Alb: 1.8 g/dL / Pro: 4.8 g/dL / ALK PHOS: 117 U/L / ALT: 7 U/L / AST: 16 U/L / GGT: x                 Urinalysis Basic - ( 03 Aug 2023 06:21 )    Color: x / Appearance: x / SG: x / pH: x  Gluc: 94 mg/dL / Ketone: x  / Bili: x / Urobili: x   Blood: x / Protein: x / Nitrite: x   Leuk Esterase: x / RBC: x / WBC x   Sq Epi: x / Non Sq Epi: x / Bacteria: x      Procalcitonin, Serum: 0.47 ng/mL (07-27-23 @ 05:33)      Ferritin: 1199 ng/mL (07-28-23 @ 05:31)            A1C with Estimated Average Glucose Result: 5.7 % (07-27-23 @ 05:33)  A1C with Estimated Average Glucose Result: 6.2 % (05-31-23 @ 05:50)          RADIOLOGY, ECG, & ADDITIONAL TESTS:  12 Lead ECG:   Ventricular Rate 84 BPM    Atrial Rate 84 BPM    P-R Interval 108 ms    QRS Duration 90 ms    Q-T Interval 356 ms    QTC Calculation(Bazett) 420 ms    R Axis 22 degrees    T Axis 16 degrees    Diagnosis Line Sinus rhythm with short [place]  Otherwise normal ECG    Confirmed by MACIEL BORREGO MD ([id number]) on 7/12/2023 6:50:09 AM (07-12-23 @ 02:18)    CT Head No Cont:   ACC: [id number] EXAM:  CT BRAIN   ORDERED BY: EDYTA RICHARDS     PROCEDURE DATE:  08/01/2023          INTERPRETATION:  CLINICAL INDICATION: Ventricular peritoneal shunt   reprogramming.    Technique: CT of the head was performed without contrast.    Multiple contiguous axial images were acquired from the skullbase to the   vertex without the administration of intravenous contrast.  Coronal and   sagittal reformations were made.    COMPARISON:  prior head CT dated 7/30/2023.    FINDINGS:      EEG leads are noted overlying scalp causing streak artifact limiting   evaluation..    Since the prior examination there has been overall decreased lateral and   third ventricular dilation.    Patient is again noted to be status post suboccipital craniectomy with   essentially unchanged fluid collection noted at the craniectomy site, 4.4   x 1.4 cm. Unchanged hyperdensity anterior to the fluid collection at the   craniectomy site. Stable positioning of a right parietal approach   ventricular peritoneal shunt catheter, terminating near the foramen of   Monro. The lateral ventricles are slitlike in size, unchanged from prior.   There is similar ex vacuo dilation of the fourth ventricle and cerebral   aqueduct    Unchanged hypodense subdural collection along the right frontal convexity   measuring approximately 7 mm in thickness with approximately 3 mm right   to left midline shift.    Redemonstrated hypodensity in the left occipital lobe is without change.   Unchanged left high parietal hypodensity, likely reflecting chronic   infarct though new since the remote examinations.    Stable mucosal thickening in the right sphenoid sinus. Mild mucosal   thickening in the partially visualized left maxillary sinus. Complete   opacification of thebilateral mastoid air cells, unchanged. The   visualized orbits are unremarkable.      IMPRESSION:  Since the prior examination there has been slightly decreased overall   ventricular dilation with right parietal ventriculoperitoneal shunt   catheterin appropriate position.    Unchanged thin right frontal convexity subdural collection with minimal   leftward midline shift as well as slitlike appearance of the lateral   ventricles with right parietal ventriculoperitoneal shunt in appropriate   position.    Redemonstration of a wedge-shaped hypodensity involving the left   occipital lobe which may reflect age-indeterminate infarct.      --- End of Report ---            JANIE MCMILLAN MD; Attending Radiologist  This document has been electronically signed. Aug  1 2023  5:47PM (08-01-23 @ 16:39)  CT Head No Cont:   ACC: [id number] EXAM:  CT BRAIN   ORDERED BY: MELITON METZGER     PROCEDURE DATE:  07/30/2023          INTERPRETATION:  CLINICAL INDICATION: Follow-up shunt    TECHNIQUE: CT of the head was performed without the administration of   intravenous contrast.    COMPARISON: CT head 7/29/2023    FINDINGS:    Evaluation is somewhat limited by streak artifact from EEG leads.    Ventriculostomy catheter is seen traversing the right parietal region   with tip terminating in the right lateral ventricle, stable in position.   No evidence of pericatheter hemorrhage. There is slight further   enlargement of the supratentorial ventricles since the prior CT head from   7/29/2023. Stable dilatation of the fourth ventricle and the cerebral   aqueduct.    Stable volume of the thin subdural hemorrhages along the right convexity.   Nearly resolved subdural hemorrhage along the left convexity. Stable   minimal leftward midline shift.    Stable suboccipital craniectomy and evolving bilateral cerebellar   encephalomalacia. 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   Stable hyperdensity anterior to the CSF collection, unchanged since the   prior CT from 11/4/2021.    Stable left occipital patchy hypodensity, likely representing subacute   infarct.    There is no evidence of new acute territorial infarct or intracranial   hemorrhage.    The visualized intraorbital contents are normal. Partial opacification of   the right sphenoid sinus. Mild mucosal thickening of the left sphenoid   sinus. Complete opacification of the bilateral mastoid and middle ear   cavities.    IMPRESSION:    Slight further enlargement of the supratentorial ventricles since the   prior CT head from 7/29/2023. This could reflect reexpansion of the   ventricles versus mild hydrocephalus. Stable positioning of the right   parietal approach ventriculostomy catheter.    Unchanged thin subdural hemorrhage along the right convexity and minimal   midline shift to the left.    Stable suboccipital craniectomy and small pseudomeningocele.    Redemonstrated small left occipital patchy hyperdensity which could   reflect subacute infarct.    --- End of Report ---          JAZMÍN RICE MD; Resident Radiologist  This document has been electronically signed.  PALOMO WILKINSON MD; Attending Radiologist  This document has been electronically signed. Jul 31 2023  2:49PM (07-30-23 @ 15:58)    RECENT DIAGNOSTIC ORDERS:  Valproic Acid, Free: AM Sched. Collection: 04-Aug-2023 04:30 (08-03-23 @ 11:05)  Basic Metabolic Panel: 20:00 (08-03-23 @ 09:21)  Phosphorus: 16:00 (08-03-23 @ 08:18)  Diet, NPO with Tube Feed:   Tube Feeding Modality: Gastrostomy  Peptamen A.F. Formula  Total Volume for 24 Hours (mL): 1500  Bolus  Total Volume of Bolus (mL):  375  Tube Feed Frequency: Every 6 hours   Tube Feed Start Time: 20:00  Bolus Feed Rate (mL per Hour): 500   Bolus Feed Duration (in Hours): 0.75  Free Water Flush Instructions:  50ml water by syringe push before and after each feed  Charly(7 Gm Arginine/7 Gm Glut/1.2 Gm HMB     Qty per Day:  2 (08-02-23 @ 18:15)  EEG w/ Video Each 12-26 Hours, Unmonitored (08-02-23 @ 14:17)      MEDICATIONS:  MEDICATIONS  (STANDING):  aMIOdarone    Tablet 200 milliGRAM(s) Oral daily  ascorbic acid 500 milliGRAM(s) Oral daily  atorvastatin 80 milliGRAM(s) Oral at bedtime  chlorhexidine 0.12% Liquid 15 milliLiter(s) Oral Mucosa every 12 hours  chlorhexidine 2% Cloths 1 Application(s) Topical <User Schedule>  clonazePAM  Tablet 2 milliGRAM(s) Oral three times a day  collagenase Ointment 1 Application(s) Topical once  collagenase Ointment 1 Application(s) Topical two times a day  Dakins Solution - 1/2 Strength 1 Application(s) Topical every 12 hours  dextrose 5%. 1000 milliLiter(s) (50 mL/Hr) IV Continuous <Continuous>  dextrose 5%. 1000 milliLiter(s) (100 mL/Hr) IV Continuous <Continuous>  dextrose 50% Injectable 25 Gram(s) IV Push once  dextrose 50% Injectable 12.5 Gram(s) IV Push once  dextrose 50% Injectable 25 Gram(s) IV Push once  epoetin guanakito-epbx (RETACRIT) Injectable 80853 Unit(s) SubCutaneous every 7 days  ergocalciferol Drops 1000 Unit(s) Oral daily  folic acid 1 milliGRAM(s) Oral daily  glucagon  Injectable 1 milliGRAM(s) IV Push once  glucagon  Injectable 1 milliGRAM(s) IntraMuscular once  insulin lispro (ADMELOG) corrective regimen sliding scale   SubCutaneous at bedtime  lacosamide IVPB 100 milliGRAM(s) IV Intermittent every 12 hours  metoclopramide Injectable 5 milliGRAM(s) IV Push three times a day  midodrine 10 milliGRAM(s) Oral every 8 hours  multivitamin 1 Tablet(s) Oral daily  pantoprazole  Injectable 40 milliGRAM(s) IV Push two times a day  polyethylene glycol 3350 17 Gram(s) Oral two times a day  potassium chloride   Powder 40 milliEquivalent(s) Oral once  senna 2 Tablet(s) Oral at bedtime  silver sulfADIAZINE 1% Cream 1 Application(s) Topical two times a day  tamsulosin 0.4 milliGRAM(s) Oral at bedtime  valproate sodium  IVPB 750 milliGRAM(s) IV Intermittent every 8 hours    MEDICATIONS  (PRN):  acetaminophen     Tablet .. 650 milliGRAM(s) Oral every 6 hours PRN Temp greater or equal to 38C (100.4F), Mild Pain (1 - 3)  aluminum hydroxide/magnesium hydroxide/simethicone Suspension 30 milliLiter(s) Oral every 4 hours PRN Dyspepsia  dextrose Oral Gel 15 Gram(s) Oral once PRN Blood Glucose LESS THAN 70 milliGRAM(s)/deciliter  melatonin 3 milliGRAM(s) Oral at bedtime PRN Insomnia      HOME MEDICATIONS:  acetaminophen 325 mg oral tablet (07-27)  albuterol 2.5 mg/3 mL (0.083%) inhalation solution (07-27)  Amaryl 1 mg oral tablet (07-27)  amiodarone 200 mg oral tablet (07-27)  ascorbic acid 500 mg oral tablet (07-27)  aspirin 81 mg oral tablet, chewable (07-27)  atorvastatin 80 mg oral tablet (07-27)  Atrovent 18 mcg/inh inhalation aerosol (07-27)  bumetanide 2 mg oral tablet (07-27)  chlorhexidine 0.12% mucous membrane liquid (07-27)  doxazosin 4 mg oral tablet (07-27)  ergocalciferol 200 mcg/mL (8000 intl units/mL) oral solution (07-27)  ferrous sulfate 220 mg/5 mL (44 mg/5 mL elemental iron) oral elixir (07-27)  folic acid 1 mg oral tablet (07-27)  Keppra 100 mg/mL oral solution (07-27)  Multiple Vitamins oral tablet (07-27)  pantoprazole 40 mg oral delayed release tablet (07-27)  polyethylene glycol 3350 oral powder for reconstitution (07-27)  Retacrit 40,000 units/mL preservative-free injectable solution (07-27)  SSD 1% topical cream (07-27)  Venofer 20 mg/mL intravenous solution (07-27)  zinc sulfate 220 mg oral tablet (07-27)      PHYSICAL EXAM:  On exam  General: non verbal, non responsive,  chronic ill appearance, connected to VEEG   Lungs:  decrease breath sounds b/l, trach on mech vent   Heart: regular ryhthm   Abdomen: soft, non tender non distended + PEG, condom cath, digni shield   Ext: anasarca  , LUE intermittent twitching or jerky like

## 2023-08-03 NOTE — PROGRESS NOTE ADULT - ASSESSMENT
63-year-old male with multiple comorbidities, bedridden at baseline, trach/PEG, AFib, CKD on HD, Hx of IPH w/ IVH/SAH/hydrocephalus, s/p  shunt in 2021, CAD  admitted w SE and VEEG showing R sided PLEDs on VPA 2250/day and /day, clinically - critically ill, on MT and with L sided jerking movements which is less today after CLZ introduction, mostly concurrent w R-sided PLEDs on VEEG indicates severe focal cerebral damage and prognosis guarded.  At this point needs seizure control: seems his jerking movements time locked and synchronous w periodic sharps on VEEG, w/o improvement after VPA 2 g. Will try to suppress jerking mvmnts w uptitrating CLZ.       Recommendations:     - c/w VEEG  - check free Valproate level  - increase Clonazepam to 3 mg tid  - c/w Vimpat 100 mg bid  - c/w Depakote 750 mg tid  - rest of tx per primary team, palliative consult, GOC discussion.          The case was discussed w Dr. Santos

## 2023-08-03 NOTE — PROGRESS NOTE ADULT - SUBJECTIVE AND OBJECTIVE BOX
Over Night Events: events noted, vent dependant. on V EEG, neuro reviewed, afebrile    PHYSICAL EXAM    ICU Vital Signs Last 24 Hrs  T(C): 35.9 (03 Aug 2023 07:53), Max: 35.9 (03 Aug 2023 07:53)  T(F): 96.7 (03 Aug 2023 07:53), Max: 96.7 (03 Aug 2023 07:53)  HR: 88 (03 Aug 2023 14:00) (72 - 89)  BP: 102/48 (03 Aug 2023 12:50) (90/48 - 116/56)  BP(mean): 65 (03 Aug 2023 07:53) (65 - 80)  RR: 20 (03 Aug 2023 12:50) (18 - 20)  SpO2: 100% (03 Aug 2023 14:00) (99% - 100%)    O2 Parameters below as of 03 Aug 2023 12:50  Patient On (Oxygen Delivery Method): ventilator            General: ill looking  HEENT: trach        Lungs: DEC BS both bases  Cardiovascular: Regular   Abdomen: Soft, Positive BS  sacral ulcer  unresponsive      08-02-23 @ 07:01  -  08-03-23 @ 07:00  --------------------------------------------------------  IN:  Total IN: 0 mL    OUT:    Rectal Tube (mL): 50 mL    Voided (mL): 350 mL  Total OUT: 400 mL    Total NET: -400 mL      08-03-23 @ 07:01  -  08-03-23 @ 16:02  --------------------------------------------------------  IN:  Total IN: 0 mL    OUT:    Other (mL): 1000 mL    Rectal Tube (mL): 50 mL    Voided (mL): 100 mL  Total OUT: 1150 mL    Total NET: -1150 mL          LABS:                          7.6    14.06 )-----------( 265      ( 03 Aug 2023 06:21 )             23.5                                               08-03    135  |  97<L>  |  68<HH>  ----------------------------<  94  3.4<L>   |  22  |  3.1<H>    Ca    8.1<L>      03 Aug 2023 06:21  Phos  2.8     08-03  Mg     2.5     08-03    TPro  4.8<L>  /  Alb  1.8<L>  /  TBili  0.3  /  DBili  x   /  AST  16  /  ALT  7   /  AlkPhos  117<H>  08-03                                             Urinalysis Basic - ( 03 Aug 2023 06:21 )    Color: x / Appearance: x / SG: x / pH: x  Gluc: 94 mg/dL / Ketone: x  / Bili: x / Urobili: x   Blood: x / Protein: x / Nitrite: x   Leuk Esterase: x / RBC: x / WBC x   Sq Epi: x / Non Sq Epi: x / Bacteria: x                                                  LIVER FUNCTIONS - ( 03 Aug 2023 06:21 )  Alb: 1.8 g/dL / Pro: 4.8 g/dL / ALK PHOS: 117 U/L / ALT: 7 U/L / AST: 16 U/L / GGT: x                                                                                               Mode: AC/ CMV (Assist Control/ Continuous Mandatory Ventilation)  RR (machine): 18  TV (machine): 450  FiO2: 40  PEEP: 8  MAP: 13  PIP: 26                                          MEDICATIONS  (STANDING):  aMIOdarone    Tablet 200 milliGRAM(s) Oral daily  ascorbic acid 500 milliGRAM(s) Oral daily  atorvastatin 80 milliGRAM(s) Oral at bedtime  chlorhexidine 0.12% Liquid 15 milliLiter(s) Oral Mucosa every 12 hours  chlorhexidine 2% Cloths 1 Application(s) Topical <User Schedule>  clonazePAM  Tablet 3 milliGRAM(s) Oral three times a day  collagenase Ointment 1 Application(s) Topical once  collagenase Ointment 1 Application(s) Topical two times a day  Dakins Solution - 1/2 Strength 1 Application(s) Topical every 12 hours  dextrose 5%. 1000 milliLiter(s) (100 mL/Hr) IV Continuous <Continuous>  dextrose 5%. 1000 milliLiter(s) (50 mL/Hr) IV Continuous <Continuous>  dextrose 50% Injectable 25 Gram(s) IV Push once  dextrose 50% Injectable 12.5 Gram(s) IV Push once  dextrose 50% Injectable 25 Gram(s) IV Push once  epoetin guanakito-epbx (RETACRIT) Injectable 81243 Unit(s) SubCutaneous every 7 days  ergocalciferol Drops 1000 Unit(s) Oral daily  folic acid 1 milliGRAM(s) Oral daily  glucagon  Injectable 1 milliGRAM(s) IV Push once  glucagon  Injectable 1 milliGRAM(s) IntraMuscular once  insulin lispro (ADMELOG) corrective regimen sliding scale   SubCutaneous at bedtime  lacosamide IVPB 100 milliGRAM(s) IV Intermittent every 12 hours  metoclopramide Injectable 5 milliGRAM(s) IV Push three times a day  midodrine 10 milliGRAM(s) Oral every 8 hours  multivitamin 1 Tablet(s) Oral daily  pantoprazole  Injectable 40 milliGRAM(s) IV Push two times a day  polyethylene glycol 3350 17 Gram(s) Oral two times a day  senna 2 Tablet(s) Oral at bedtime  silver sulfADIAZINE 1% Cream 1 Application(s) Topical two times a day  tamsulosin 0.4 milliGRAM(s) Oral at bedtime  valproate sodium  IVPB 750 milliGRAM(s) IV Intermittent every 8 hours    MEDICATIONS  (PRN):  acetaminophen     Tablet .. 650 milliGRAM(s) Oral every 6 hours PRN Temp greater or equal to 38C (100.4F), Mild Pain (1 - 3)  aluminum hydroxide/magnesium hydroxide/simethicone Suspension 30 milliLiter(s) Oral every 4 hours PRN Dyspepsia  dextrose Oral Gel 15 Gram(s) Oral once PRN Blood Glucose LESS THAN 70 milliGRAM(s)/deciliter  melatonin 3 milliGRAM(s) Oral at bedtime PRN Insomnia      cxr noted

## 2023-08-03 NOTE — PROGRESS NOTE ADULT - ASSESSMENT
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  was found also to be hypoglycemic with blood sugar of 10 and had two episodes of tonic clonic seizures in the ED.   Patient non-verbal at baseline . s/p MICU monitoring, now downgraded to SDU    Hypoglycemia possibly due to sulfonyl urea  DM II  - Blood sugar in ed: 10 s/p multiple dextrose pushes /d10  - off standing insulin, on ISS  - monitor FS, avoid sulfonylurea    GTC seizure x2.  status epilepticus   large posterior fossa IPH w/ IVH/SAH/hydrocephalus  Bedbound/Nonverbal at baseline/ Chronical respiratory failure s/p trach/Peg  - Patient s/p Versed 4mg x1, Keppra 1gram IV x1, Ativan 4mg IV x2, with Propofol gtt initiated for seizures   - video EEG: Focal and generalized slowing, Interictal activity, runs of bilateral FP ( higher amplitude from the right) rhythmic activity that appears not be physiological . ( ? of artifact). It appears as questionable low amplitude spike and aftergoing slow with modified morphology( ? possibility of change due the the prior procedure)  - neurocrit and neurosurgery following   s/p vpa bolus  -Neurology f/u appreciated  c/w VEEG  - check free Valproate level  - Clonazepam increased to 3 mg tid   - c/w Vimpat 100 mg bid  - c/w Depakote 750 mg tid  - shunt adjusted by neurosx 7/31, repeat CTH 8/1 reported ( there has been slightly decreased overall ventricular dilation with right parietal ventriculoperitoneal shunt   catheterin appropriate position.  Unchanged thin right frontal convexity subdural collection with minimal leftward midline shift as well as slitlike appearance of the lateral   ventricles with right parietal ventriculoperitoneal shunt in appropriate position. Redemonstration of a wedge-shaped hypodensity involving the left   occipital lobe which may reflect age-indeterminate infarct.)  -As per Neurosurgery-  will need MRI with CSF flow study to evaluate the aqueductal hydrocephalus once improved   - vent management per pulm     ESRD on Dialysis Monday/THursday   Acute Renal Failure, started on HD 6/1/23  Anemia of chronic disease  - daily BMP monitoring, daily weight, daily strict I/O   - s/p Tesio cath on 7/7/23  - HD per renal   - s/p 1u PRBC since admission, monitor CBC/Keep active T&S    Afib w/ RVR  -now rate controlled, on amiodarone, off AC due to brain bleed    Multiple sacral wounds and buttock   - burn team following, Wound care - Santyl/ Moist Kerlex packing with 1/4 Dakins / DPD BID to sacrum and left ischium  Silvadene/DPD BID to Rt Ischium, no surgical intervention   frequent turning, off loading,and positioning and skin care as per protocol, Maintain pressure injury prevention, Keep skin clean, Offload heels, Monitor wound for changes and notify provider if any   - nutrition support on board     DNR, overall prognosis is very poor - palliative team consult     pending: neuro/ palliative / nephro     team updated the patient wife

## 2023-08-03 NOTE — EEG REPORT - NS EEG TEXT BOX
Epilepsy Attending Note:     ALICIA BARBOUR    63y Male  MRN [id number]    Vital Signs Last 24 Hrs  T(C): 35.9 (03 Aug 2023 07:53), Max: 36.3 (02 Aug 2023 11:49)  T(F): 96.7 (03 Aug 2023 07:53), Max: 97.4 (02 Aug 2023 11:49)  HR: 72 (03 Aug 2023 09:50) (72 - 94)  BP: 101/56 (03 Aug 2023 09:50) (90/48 - 120/58)  BP(mean): 65 (03 Aug 2023 07:53) (65 - 83)  RR: 18 (03 Aug 2023 09:50) (18 - 20)  SpO2: 100% (03 Aug 2023 09:50) (99% - 100%)    Parameters below as of 03 Aug 2023 09:50  Patient On (Oxygen Delivery Method): ventilator                              7.6    14.06 )-----------( 265      ( 03 Aug 2023 06:21 )             23.5       08-03    135  |  97<L>  |  68<HH>  ----------------------------<  94  3.4<L>   |  22  |  3.1<H>    Ca    8.1<L>      03 Aug 2023 06:21  Phos  2.8     08-03  Mg     2.5     08-03    TPro  4.8<L>  /  Alb  1.8<L>  /  TBili  0.3  /  DBili  x   /  AST  16  /  ALT  7   /  AlkPhos  117<H>  08-03      MEDICATIONS  (STANDING):  aMIOdarone    Tablet 200 milliGRAM(s) Oral daily  ascorbic acid 500 milliGRAM(s) Oral daily  atorvastatin 80 milliGRAM(s) Oral at bedtime  chlorhexidine 0.12% Liquid 15 milliLiter(s) Oral Mucosa every 12 hours  chlorhexidine 2% Cloths 1 Application(s) Topical <User Schedule>  clonazePAM  Tablet 2 milliGRAM(s) Oral three times a day  collagenase Ointment 1 Application(s) Topical once  collagenase Ointment 1 Application(s) Topical two times a day  Dakins Solution - 1/2 Strength 1 Application(s) Topical every 12 hours  dextrose 5%. 1000 milliLiter(s) (100 mL/Hr) IV Continuous <Continuous>  dextrose 5%. 1000 milliLiter(s) (50 mL/Hr) IV Continuous <Continuous>  dextrose 50% Injectable 25 Gram(s) IV Push once  dextrose 50% Injectable 12.5 Gram(s) IV Push once  dextrose 50% Injectable 25 Gram(s) IV Push once  epoetin guanakito-epbx (RETACRIT) Injectable 69903 Unit(s) SubCutaneous every 7 days  ergocalciferol Drops 1000 Unit(s) Oral daily  folic acid 1 milliGRAM(s) Oral daily  glucagon  Injectable 1 milliGRAM(s) IV Push once  glucagon  Injectable 1 milliGRAM(s) IntraMuscular once  insulin lispro (ADMELOG) corrective regimen sliding scale   SubCutaneous at bedtime  lacosamide IVPB 100 milliGRAM(s) IV Intermittent every 12 hours  metoclopramide Injectable 5 milliGRAM(s) IV Push three times a day  midodrine 10 milliGRAM(s) Oral every 8 hours  multivitamin 1 Tablet(s) Oral daily  pantoprazole  Injectable 40 milliGRAM(s) IV Push two times a day  polyethylene glycol 3350 17 Gram(s) Oral two times a day  potassium chloride   Powder 40 milliEquivalent(s) Oral once  senna 2 Tablet(s) Oral at bedtime  silver sulfADIAZINE 1% Cream 1 Application(s) Topical two times a day  tamsulosin 0.4 milliGRAM(s) Oral at bedtime  valproate sodium  IVPB 750 milliGRAM(s) IV Intermittent every 8 hours    MEDICATIONS  (PRN):  acetaminophen     Tablet .. 650 milliGRAM(s) Oral every 6 hours PRN Temp greater or equal to 38C (100.4F), Mild Pain (1 - 3)  aluminum hydroxide/magnesium hydroxide/simethicone Suspension 30 milliLiter(s) Oral every 4 hours PRN Dyspepsia  dextrose Oral Gel 15 Gram(s) Oral once PRN Blood Glucose LESS THAN 70 milliGRAM(s)/deciliter  melatonin 3 milliGRAM(s) Oral at bedtime PRN Insomnia      Valproic Acid Level, Serum: 40.0 ug/mL [50.0 - 100.0] (23 @ 16:56)  Valproic Acid Level, Serum: 35.0 ug/mL [50.0 - 100.0] (23 @ 06:43)  Valproic Acid Level, Serum: 32.0 ug/mL [50.0 - 100.0] (23 @ 04:30)        VEEG in the last 24 hours:    Background - continuous, asymmetrical with higher amplitude and breach artifact from the right side, reaching frequencies in the range of 4-5 Hz, showing reactivity.    Focal and generalized slowin. moderate generalized slowing  2. moderate right hemispheric focal slowing  3. independent mild to moderate left hemispheric focal slowing    Interictal activity - very frequent right hemispheric sharp waves and spikes that at times appear in periodic pattern    Events - none    Seizures - none    Impression: Abnormal VEEG as above    Plan - per neurology team

## 2023-08-03 NOTE — PROGRESS NOTE ADULT - SUBJECTIVE AND OBJECTIVE BOX
ALICIA BARBOUR 63y Male  MRN#: [id number]     Hospital Day: 8d    Pt is currently admitted with the primary diagnosis of     Overnight events   -No major overnight events                                          ----------------------------------------------------------  OBJECTIVE  PAST MEDICAL & SURGICAL HISTORY  HTN (hypertension)    Diabetes mellitus    H/O intracranial hemorrhage    H/O tracheostomy    PEG (percutaneous endoscopic gastrostomy) status    Hyperlipidemia                                              -----------------------------------------------------------  MEDICATIONS:  STANDING MEDICATIONS  aMIOdarone    Tablet 200 milliGRAM(s) Oral daily  ascorbic acid 500 milliGRAM(s) Oral daily  atorvastatin 80 milliGRAM(s) Oral at bedtime  chlorhexidine 0.12% Liquid 15 milliLiter(s) Oral Mucosa every 12 hours  chlorhexidine 2% Cloths 1 Application(s) Topical <User Schedule>  clonazePAM  Tablet 2 milliGRAM(s) Oral three times a day  collagenase Ointment 1 Application(s) Topical once  collagenase Ointment 1 Application(s) Topical two times a day  Dakins Solution - 1/2 Strength 1 Application(s) Topical every 12 hours  dextrose 5%. 1000 milliLiter(s) IV Continuous <Continuous>  dextrose 5%. 1000 milliLiter(s) IV Continuous <Continuous>  dextrose 50% Injectable 25 Gram(s) IV Push once  dextrose 50% Injectable 12.5 Gram(s) IV Push once  dextrose 50% Injectable 25 Gram(s) IV Push once  epoetin guanakito-epbx (RETACRIT) Injectable 60691 Unit(s) SubCutaneous every 7 days  ergocalciferol Drops 1000 Unit(s) Oral daily  folic acid 1 milliGRAM(s) Oral daily  glucagon  Injectable 1 milliGRAM(s) IV Push once  glucagon  Injectable 1 milliGRAM(s) IntraMuscular once  insulin lispro (ADMELOG) corrective regimen sliding scale   SubCutaneous at bedtime  lacosamide IVPB 100 milliGRAM(s) IV Intermittent every 12 hours  metoclopramide Injectable 5 milliGRAM(s) IV Push three times a day  midodrine 10 milliGRAM(s) Oral every 8 hours  multivitamin 1 Tablet(s) Oral daily  pantoprazole  Injectable 40 milliGRAM(s) IV Push two times a day  polyethylene glycol 3350 17 Gram(s) Oral two times a day  potassium chloride   Powder 40 milliEquivalent(s) Oral once  senna 2 Tablet(s) Oral at bedtime  silver sulfADIAZINE 1% Cream 1 Application(s) Topical two times a day  tamsulosin 0.4 milliGRAM(s) Oral at bedtime  valproate sodium  IVPB 750 milliGRAM(s) IV Intermittent every 8 hours    PRN MEDICATIONS  acetaminophen     Tablet .. 650 milliGRAM(s) Oral every 6 hours PRN  aluminum hydroxide/magnesium hydroxide/simethicone Suspension 30 milliLiter(s) Oral every 4 hours PRN  dextrose Oral Gel 15 Gram(s) Oral once PRN  melatonin 3 milliGRAM(s) Oral at bedtime PRN                                            ------------------------------------------------------------  VITAL SIGNS: Last 24 Hours  T(C): 35.9 (03 Aug 2023 07:53), Max: 35.9 (03 Aug 2023 07:53)  T(F): 96.7 (03 Aug 2023 07:53), Max: 96.7 (03 Aug 2023 07:53)  HR: 72 (03 Aug 2023 09:50) (72 - 94)  BP: 101/56 (03 Aug 2023 09:50) (90/48 - 120/58)  BP(mean): 65 (03 Aug 2023 07:53) (65 - 83)  RR: 18 (03 Aug 2023 09:50) (18 - 20)  SpO2: 100% (03 Aug 2023 09:50) (99% - 100%)      08-02-23 @ 07:01  -  08-03-23 @ 07:00  --------------------------------------------------------  IN: 0 mL / OUT: 400 mL / NET: -400 mL                                             --------------------------------------------------------------  LABS:                        7.6    14.06 )-----------( 265      ( 03 Aug 2023 06:21 )             23.5     08-03    135  |  97<L>  |  68<HH>  ----------------------------<  94  3.4<L>   |  22  |  3.1<H>    Ca    8.1<L>      03 Aug 2023 06:21  Phos  2.8     08-03  Mg     2.5     08-03    TPro  4.8<L>  /  Alb  1.8<L>  /  TBili  0.3  /  DBili  x   /  AST  16  /  ALT  7   /  AlkPhos  117<H>  08-03      Urinalysis Basic - ( 03 Aug 2023 06:21 )    Color: x / Appearance: x / SG: x / pH: x  Gluc: 94 mg/dL / Ketone: x  / Bili: x / Urobili: x   Blood: x / Protein: x / Nitrite: x   Leuk Esterase: x / RBC: x / WBC x   Sq Epi: x / Non Sq Epi: x / Bacteria: x                                                            --------------------------------------------------------------  PHYSICAL EXAM:  GENERAL: Asleep, NAD  HEENT: trach, drooling thick secretions from mouth  LUNGS: apicals clear  HEART: S1/S2  ABD: Soft  EXT: L arm not twitching this AM  SKIN: swollen arms and legs    PLAN:    # DVT prophylaxis     # GI prophylaxis     # Diet     # Activity Score (AM-PAC)    #Progress Note Handoff  Pending (specify):  Family discussion:   Disposition:

## 2023-08-03 NOTE — PROGRESS NOTE ADULT - ASSESSMENT
IMPRESSION:    Seizure / hypoglycemia on  VEEG  Chronic hypoxemic resp failure/ trach  pul edema  HO ESRD on HD  NSTEMI likely type 2  Paroxysmal Afib  H/o ICH s/p trach and PEG  H/O CAD    PLAN:    CNS:  FU VEEG.  Continue AED per neuro.  Neuro sx evaluation appreciated.  fup AED level    HEENT: Oral and trach care    PULMONARY: HOB 45. Aspiration. Increase RR 22.  Goal saturation 92-96%. Vent dependent.  Not weanable.  Pulmonary toilet     CARDIOVASCULAR: Avoid overload. Midodrine to 10 mg Q8     GI:  Feeding  Adjust Bowel regimen.      RENAL: trend CMP.  Correct as needed.  Nephrology following. HD per renal.     INFECTIOUS DISEASE: Monitor VS     HEMATOLOGICAL: DVT prophylaxis.    ENDOCRINE: Follow up FS. Insulin protocol if needed.    DNR    Off loading.  Skin care per burn / wound nurse    Poor prognosis    SDU

## 2023-08-03 NOTE — PROGRESS NOTE ADULT - ASSESSMENT
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    ESRD on HD   Biweekly schedule on Monday/ Thursday    hd today standard bath uf 2 liters as tolerated    BP on the low side continue midodrine , d/c doxazosin   h/h noted on retacrit weekly,  ferritin elevated   ph at goal / no binders   Neurology f/up appreciated   followed by palliative care / waiting for family meeting   overall prognosis poor   will follow

## 2023-08-03 NOTE — PROGRESS NOTE ADULT - ASSESSMENT
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  was found also to be hypoglycemic with blood sugar of 10 and had two episodes of tonic clonic seizures in the ED.   Patient non-verbal at baseline .    #Hypoglycemia possibly due to sulfonyl urea med  #GTC seizure x2.  #status epilepticus in the setting of hypoglycemia  #ESRD on glimiperide  - Blood sugar in ed: 10  - s/P multiple iv dextrose solutions   - Patient s/p Versed 4mg x1, Keppra 1gram IV x1, Ativan 4mg IV x2, with Propofol gtt initiated.  - s/p valproic acid bolus and valproic acid standing, clonazepam increased to 2 TID  -  fu free valproic acid level  - fu neurosurgery adjusting shunt settings and CTHs  - CTH 7/29:   - Stable suboccipital craniectomy and cerebellar encephalomalacia with   small stable overlying pseudomeningocele compared to the prior CT head   from 7/20/2023.  -Stable moderate dilatation of the fourth ventricle and cerebral aqueduct.   Slight interval enlargement of the supratentorial ventricles could   reflect reexpansion versus mild hydrocephalus. Unchanged positioning of   the right parietal approach ventriculostomy catheter.  -Unchanged right cerebral convexity subdural collection with stable to   decreased minimal leftward midline shift.  - video EEG: Focal and generalized slowing, Interictal activity, runs of bilateral FP ( higher amplitude from the right) rhythmic activity that appears not be physiological . ( ? of artifact). It appears as questonable low amplitude spike and aftergoing slow with modified morphology( ? possiblity of change due the the prior procedure)  - consider Ativan challenge as per NICU   - now on D10, propofol 50 and levophed. if no seizure dc propofol  - Hold ASA as per Neuro  - In the setting of ESRD, D/C'ed Keppra -> s/p VPA load 40mg/kg, continue 500mg q12hrs  - A1c 5.7, lipids unremarkable  - TSH 5.37, Ft4, pro-insulin and Serum Cortisol  - BCx NGTD, procalcitonin 0.47, CRP 47    #ESRD on Dialysis through Tesio cath   # Acute Renal Failure, started on HD 6/1, as per Renal team rec.   - HD today  - daily BMP monitoring, daily weight, daily strict I/O chart  - s/p permanent HD cath. placement on 7/7/23  - fu nephro notes : Biweekly schedule . no acute indications to start CVVHD today.   - Vitamin D and PTH WNL  - On ritacrit and venofer   - consider increasing midodrine before HD  - dc doxazosin -> flomax    #tube feeds regurgitating through nose - resolved  - switched to bolus feeds  - no desats  - added prokinetic (reglan)   - increased bowel regimen     # anemia of chronic disease - no gross bleeding events  - hb 7.2  - s/p 1 pack RBC  - transfuse prn for goal > 7  - On ritacrit and venofer   - Hold ASA as per Neuro    # Hx ICH   # s/p  shunt  - Bedbound from NH . chronic non-verbal , quadriplegia - continue daily care.   - c/w keppra 500mg BID for seizure prophylaxis  - fu neurosurgery   - Hold ASA as per Neuro    # chronic respiratory failure, vent dependant via trach  -  trach care,  Vent management per Pulm. team     # Chronic dysphagia  - sp peg, peg care     # Afib w/ RVR  -now rate controlled     #Acute Decompensated CHF  -continued on Amiodarone tx, rate controlled     # multiple sacral wounds and buttock  - further wound care as per Wound care specialist report, frequent body position change  - wound care per burn team recs    # h/o DM 2  - continue bsfs monitoring  with insulin sliding scale co.    DVT prophylaxis: scd  Activity:  Diet: DASH  Dispo:  Code: DNR. 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  was found also to be hypoglycemic with blood sugar of 10 and had two episodes of tonic clonic seizures in the ED.   Patient non-verbal at baseline .    #Hypoglycemia possibly due to sulfonyl urea med  #GTC seizure x2.  #status epilepticus in the setting of hypoglycemia  #ESRD on glimiperide  - Blood sugar in ed: 10  - s/P multiple iv dextrose solutions   - Patient s/p Versed 4mg x1, Keppra 1gram IV x1, Ativan 4mg IV x2, with Propofol gtt initiated.  - s/p valproic acid bolus and valproic acid standing, clonazepam increased to 3 TID  - fu free valproic acid level  - fu neurosurgery adjusting shunt settings and CTHs  - CTH 7/29:   - Stable suboccipital craniectomy and cerebellar encephalomalacia with   small stable overlying pseudomeningocele compared to the prior CT head   from 7/20/2023.  -Stable moderate dilatation of the fourth ventricle and cerebral aqueduct.   Slight interval enlargement of the supratentorial ventricles could   reflect reexpansion versus mild hydrocephalus. Unchanged positioning of   the right parietal approach ventriculostomy catheter.  -Unchanged right cerebral convexity subdural collection with stable to   decreased minimal leftward midline shift.  - video EEG: Focal and generalized slowing, Interictal activity, runs of bilateral FP ( higher amplitude from the right) rhythmic activity that appears not be physiological . ( ? of artifact). It appears as questonable low amplitude spike and aftergoing slow with modified morphology( ? possiblity of change due the the prior procedure)  - consider Ativan challenge as per NICU   - now on D10, propofol 50 and levophed. if no seizure dc propofol  - Hold ASA as per Neuro  - In the setting of ESRD, D/C'ed Keppra -> s/p VPA load 40mg/kg, continue 500mg q12hrs  - A1c 5.7, lipids unremarkable  - TSH 5.37, Ft4, pro-insulin and Serum Cortisol  - BCx NGTD, procalcitonin 0.47, CRP 47    #ESRD on Dialysis through Tesio cath   # Acute Renal Failure, started on HD 6/1, as per Renal team rec.   - HD today  - daily BMP monitoring, daily weight, daily strict I/O chart  - s/p permanent HD cath. placement on 7/7/23  - fu nephro notes : Biweekly schedule . no acute indications to start CVVHD today.   - Vitamin D and PTH WNL  - On ritacrit and venofer   - consider increasing midodrine before HD  - dc doxazosin -> flomax    #tube feeds regurgitating through nose - resolved  - switched to bolus feeds  - no desats  - added prokinetic (reglan)   - increased bowel regimen     # anemia of chronic disease - no gross bleeding events  - hb 7.2  - s/p 1 pack RBC  - transfuse prn for goal > 7  - On ritacrit and venofer   - Hold ASA as per Neuro    # Hx ICH   # s/p  shunt  - Bedbound from NH . chronic non-verbal , quadriplegia - continue daily care.   - c/w keppra 500mg BID for seizure prophylaxis  - fu neurosurgery   - Hold ASA as per Neuro    # chronic respiratory failure, vent dependant via trach  -  trach care,  Vent management per Pulm. team     # Chronic dysphagia  - sp peg, peg care     # Afib w/ RVR  -now rate controlled     #Acute Decompensated CHF  -continued on Amiodarone tx, rate controlled     # multiple sacral wounds and buttock  - further wound care as per Wound care specialist report, frequent body position change  - wound care per burn team recs    # h/o DM 2  - continue bsfs monitoring  with insulin sliding scale co.    DVT prophylaxis: scd  Activity:  Diet: DASH  Dispo:  Code: DNR. Fu palliative conversation

## 2023-08-03 NOTE — PROGRESS NOTE ADULT - ASSESSMENT
63 year old man with history of IPH s/p suboccipital craniectomy and  shunt in 2021, ESRD on dialysis, CAD, recurrent Cdiff presented with anemia.  Hospital course complicated by seizures, hypoglycemia. Palliative care consulted for GOC.    Called and spoke with Patricio over the phone. Palliative care reintroduced.  She was able to provide a medical update and hospital course.   We discussed overall GOC. She noted she had received updates from the primary team, but had not received any information about prognosis or what to expect from the team. We discussed the patient's overall poor prognosis and discussed comfort measures only at length. She wishes for ongoing medical management and to speak with the primary team/neurology. All questions answered.     MEDD (morphine equivalent daily dose):    Education about palliative care provided to patient/family.  See Recs below.    Please call [phone] with questions or concerns 24/7.   We will continue to follow.

## 2023-08-03 NOTE — PATIENT PROFILE ADULT - FALL HARM RISK - FACTORS NURSING JUDGEMENT
Knee Pain or Injury: Care Instructions  Overview     Injuries are a common cause of knee problems. Sudden (acute) injuries may be caused by a direct blow to the knee. They can also be caused by abnormal twisting, bending, or falling on the knee. Pain, bruising, or swelling may be severe, and may start within minutes of the injury. Overuse is another cause of knee pain. Other causes are climbing stairs, kneeling, and other activities that use the knee. Everyday wear and tear, especially as you get older, also can cause knee pain. Rest, along with home treatment, often relieves pain and allows your knee to heal. If you have a serious knee injury, you may need tests and treatment. Follow-up care is a key part of your treatment and safety. Be sure to make and go to all appointments, and call your doctor if you are having problems. It's also a good idea to know your test results and keep a list of the medicines you take. How can you care for yourself at home? Be safe with medicines. Read and follow all instructions on the label. If the doctor gave you a prescription medicine for pain, take it as prescribed. If you are not taking a prescription pain medicine, ask your doctor if you can take an over-the-counter medicine. Rest and protect your knee. Take a break from any activity that may cause pain. Put ice or a cold pack on your knee for 10 to 20 minutes at a time. Put a thin cloth between the ice and your skin. Prop up a sore knee on a pillow when you ice it or anytime you sit or lie down for the next 3 days. Try to keep it above the level of your heart. This will help reduce swelling. If your knee is not swollen, you can put moist heat, a heating pad, or a warm cloth on your knee. If your doctor recommends an elastic bandage, sleeve, or other type of support for your knee, wear it as directed. Follow your doctor's instructions about how much weight you can put on your leg.  Use a cane, crutches, or a walker Yes

## 2023-08-03 NOTE — PROGRESS NOTE ADULT - SUBJECTIVE AND OBJECTIVE BOX
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    Blood work was done at nursing home and found out that hemoglobin was 6.4. Patient was recently started on Glimeperide at the NH.  No sign recent fevers, bleeding per PEG, thickening of the sputum or hematochezia.    In the ED the patient was hemodynamically unstable and in status epilepticus.  Versed, Ativan, keppra and propofol were given in the ED and patient was started on Valproic acid as per NeuroCrit team  MAP less than 65 patient was started on Levophed after central line insertion  Labs were significant for mild leukocytosis, severe hypoglycemia and Hemoglobin 7,2    Patient admitted for status epilepticus secondary to hypoglycemia and possible sepsis (27 Jul 2023 01:00)    Interval history  -Patient seen at bedside  -he was unable to participate in exam     ADVANCE DIRECTIVES:     [ ] Full Code [ x] DNR  MOLST  [ ]  Living Will  [ ]   DECISION MAKER(s):  [ ] Health Care Proxy(s)  [ x] Surrogate(s)  [ ] Guardian           Name(s): Phone Number(s): Wife      BASELINE (I)ADL(s) (prior to admission):    [place]: [ ]Total  [ ] Moderate [ ]Dependent  Palliative Performance Status Version 2:         %    http://npcrc.org/files/news/palliative_performance_scale_[email or web address].pdf    Allergies    penicillin (Other)    Intolerances    MEDICATIONS  (STANDING):  aMIOdarone    Tablet 200 milliGRAM(s) Oral daily  ascorbic acid 500 milliGRAM(s) Oral daily  atorvastatin 80 milliGRAM(s) Oral at bedtime  chlorhexidine 0.12% Liquid 15 milliLiter(s) Oral Mucosa every 12 hours  chlorhexidine 2% Cloths 1 Application(s) Topical <User Schedule>  clonazePAM  Tablet 3 milliGRAM(s) Oral three times a day  collagenase Ointment 1 Application(s) Topical once  collagenase Ointment 1 Application(s) Topical two times a day  Dakins Solution - 1/2 Strength 1 Application(s) Topical every 12 hours  dextrose 5%. 1000 milliLiter(s) (100 mL/Hr) IV Continuous <Continuous>  dextrose 5%. 1000 milliLiter(s) (50 mL/Hr) IV Continuous <Continuous>  dextrose 50% Injectable 25 Gram(s) IV Push once  dextrose 50% Injectable 12.5 Gram(s) IV Push once  dextrose 50% Injectable 25 Gram(s) IV Push once  epoetin guanakito-epbx (RETACRIT) Injectable 47952 Unit(s) SubCutaneous every 7 days  ergocalciferol Drops 1000 Unit(s) Oral daily  folic acid 1 milliGRAM(s) Oral daily  glucagon  Injectable 1 milliGRAM(s) IV Push once  glucagon  Injectable 1 milliGRAM(s) IntraMuscular once  insulin lispro (ADMELOG) corrective regimen sliding scale   SubCutaneous at bedtime  lacosamide IVPB 100 milliGRAM(s) IV Intermittent every 12 hours  metoclopramide Injectable 5 milliGRAM(s) IV Push three times a day  midodrine 10 milliGRAM(s) Oral every 8 hours  multivitamin 1 Tablet(s) Oral daily  pantoprazole  Injectable 40 milliGRAM(s) IV Push two times a day  polyethylene glycol 3350 17 Gram(s) Oral two times a day  senna 2 Tablet(s) Oral at bedtime  silver sulfADIAZINE 1% Cream 1 Application(s) Topical two times a day  tamsulosin 0.4 milliGRAM(s) Oral at bedtime  valproate sodium  IVPB 750 milliGRAM(s) IV Intermittent every 8 hours    MEDICATIONS  (PRN):  acetaminophen     Tablet .. 650 milliGRAM(s) Oral every 6 hours PRN Temp greater or equal to 38C (100.4F), Mild Pain (1 - 3)  aluminum hydroxide/magnesium hydroxide/simethicone Suspension 30 milliLiter(s) Oral every 4 hours PRN Dyspepsia  dextrose Oral Gel 15 Gram(s) Oral once PRN Blood Glucose LESS THAN 70 milliGRAM(s)/deciliter  melatonin 3 milliGRAM(s) Oral at bedtime PRN Insomnia    PRESENT SYMPTOMS: [x ]Unable to obtain due to poor mentation   Source if other than patient:  [ ]Family   [ ]Team     Pain: [ ]yes [ ]no  QOL impact -   Location -                    Aggravating factors -  Quality -  Radiation -  Timing-  Severity (0-10 scale):  Minimal acceptable level (0-10 scale):     CPOT:  1  https://www.[email or web address]/getattachment/[email or web address]/Critical-Care-Pain-Observation-Tool-(CPOT)    PAIN AD Score:   http://geriatrictoolkit.[email or web address]/painad.pdf (press ctrl +  left click to view)    Dyspnea:                           [ ]None[ ]Mild [ ]Moderate [ ]Severe     Respiratory Distress Observation Scale (RDOS): 0  A score of 0 to 2 signifies little or no respiratory distress, 3 signifies mild distress, scores 4 to 6 indicate moderate distress, and scores greater than 7 signify severe distress  [email or web address]://[email or web address]/sites/default/files/PDFS/978667-clvjdzpxijj-qjccrpqi-vnreeoermat-jghzi.pdf    Anxiety:                             [ ]None[ ]Mild [ ]Moderate [ ]Severe   Fatigue:                             [ ]None[ ]Mild [ ]Moderate [ ]Severe   Nausea:                             [ ]None[ ]Mild [ ]Moderate [ ]Severe   Loss of appetite:              [ ]None[ ]Mild [ ]Moderate [ ]Severe   Constipation:                    [ ]None[ ]Mild [ ]Moderate [ ]Severe    Other Symptoms:  [x ]All other review of systems negative     Palliative Performance Status Version 2:         20%    http://[email or web address]/files/news/palliative_performance_scale_[email or web address].pdf    PHYSICAL EXAM:  Vital Signs Last 24 Hrs  T(C): 36.2 (03 Aug 2023 16:18), Max: 36.2 (03 Aug 2023 16:18)  T(F): 97.2 (03 Aug 2023 16:18), Max: 97.2 (03 Aug 2023 16:18)  HR: 84 (03 Aug 2023 16:18) (72 - 89)  BP: 104/53 (03 Aug 2023 16:18) (90/48 - 116/56)  BP(mean): 65 (03 Aug 2023 07:53) (65 - 80)  RR: 20 (03 Aug 2023 16:18) (18 - 20)  SpO2: 100% (03 Aug 2023 16:18) (99% - 100%)      GENERAL:  [ ] No acute distress [ ]Lethargic  [x]Unarousable  [ ]Verbal  [ ]Non-Verbal [ ]Cachexia    BEHAVIORAL/PSYCH:  [ ]Alert and Oriented x  [ ] Anxiety [ ] Delirium [ ] Agitation [ x] Calm   EYES: [x ] No scleral icterus [ ] Scleral icterus [ ] Closed  ENMT:  [ ]Dry mouth  [x ]No external oral lesions [ ] No external ear or nose lesions  CARDIOVASCULAR:  [ ]Regular [ ]Irregular [ ]Tachy [x ]Not Tachy  [ ]Bridger [ ] Edema [ ] No edema  PULMONARY:  [ ]Tachypnea  [ ]Audible excessive secretions [ x] No labored breathing [ ] labored breathing  GASTROINTESTINAL: [ ]Soft  [ ]Distended  [x ]Not distended [ ]Non tender [ ]Tender  MUSCULOSKELETAL: [ ]No clubbing [ ] clubbing  [x ] No cyanosis [ ] cyanosis  NEUROLOGIC: [ ]No focal deficits  [ ]Follows commands  [ x]Does not follow commands  [ ]Cognitive impairment  [ ]Dysphagia  [ ]Dysarthria  [ ]Paresis   SKIN: [ ] Jaundiced [x ] Non-jaundiced [ ]Rash [ ]No Rash [ ] Warm [ ] Dry  MISC/LINES: [ ] ET tube [ ] Trach [ ]NGT/OGT [ ]PEG [ ]Barney    LABS: reviewed by me                                   7.6    14.06 )-----------( 265      ( 03 Aug 2023 06:21 )             23.5       08-03    135  |  97<L>  |  68<HH>  ----------------------------<  94  3.4<L>   |  22  |  3.1<H>    Ca    8.1<L>      03 Aug 2023 06:21  Phos  2.8     08-03  Mg     2.5     08-03    TPro  4.8<L>  /  Alb  1.8<L>  /  TBili  0.3  /  DBili  x   /  AST  16  /  ALT  7   /  AlkPhos  117<H>  08-03              Urinalysis Basic - ( 03 Aug 2023 06:21 )    Color: x / Appearance: x / SG: x / pH: x  Gluc: 94 mg/dL / Ketone: x  / Bili: x / Urobili: x   Blood: x / Protein: x / Nitrite: x   Leuk Esterase: x / RBC: x / WBC x   Sq Epi: x / Non Sq Epi: x / Bacteria: x                  CAPILLARY BLOOD GLUCOSE      POCT Blood Glucose.: 121 mg/dL (03 Aug 2023 15:49)              RADIOLOGY & ADDITIONAL STUDIES: reviewed by me    < from: Xray Chest 1 View- PORTABLE-Routine (Xray Chest 1 View- PORTABLE-Routine in AM.) (08.03.23 @ 06:24) >    Impression:    Bilateral opacities/effusions, slightly improved.    < end of copied text >      EKG: reviewed by me    < from: 12 Lead ECG (07.12.23 @ 02:18) >  Ventricular Rate 84 BPM    Atrial Rate 84 BPM    P-R Interval 108 ms    QRS Duration 90 ms    Q-T Interval 356 ms    QTC Calculation(Bazett) 420 ms    R Axis 22 degrees    T Axis 16 degrees    Diagnosis Line Sinus rhythm with short [place]  Otherwise normal ECG    < end of copied text >        PROTEIN CALORIE MALNUTRITION PRESENT: [ ]mild [ ]moderate [ ]severe [ ]underweight [ ]morbid obesity  https://www.andeal.org/vault/2440/web/files/ONC/Table_Clinical%20Characteristics%20to%20Document%20Malnutrition-White%20JV%20et%20al%448085.pdf    Height (cm): 172.7 (07-27-23 @ 01:16), 170.2 (06-01-23 @ 09:07), 180.3 (10-04-22 @ 22:39)  Weight (kg): 93.7 (07-27-23 @ 01:16), 89.4 (07-17-23 @ 06:20), 66.4 (10-04-22 @ 22:39)  BMI (kg/m2): 31.4 (07-27-23 @ 01:16), 30.9 (07-17-23 @ 06:20), 22.9 (06-01-23 @ 09:07)  [ ]PPSV2 < or = to 30% [ ]significant weight loss  [ ]poor nutritional intake  [ ]anasarca      [ ]Artificial Nutrition      Palliative Care Spiritual/Emotional Screening Tool Question  Severity (0-4):                    OR                    [ x] Unable to determine/NA  Score of 2 or greater indicates recommendation of Chaplaincy referral  Chaplaincy Referral: [ ] Yes [ ] Refused [ ] Following     Caregiver Commerce:  [ ] Yes [ ] No [ x] Deferred  Social Work Referral [ ]  Patient and Family Centered Care Referral [ ]    Anticipatory Grief Present: [ ] Yes [ ] No [ x] Deferred  Social Work Referral [ ]  Patient and Family Centered Care Referral [ ]    Patient discussed with primary medical team MD  Palliative care education provided to patient and/or family

## 2023-08-03 NOTE — PROGRESS NOTE ADULT - SUBJECTIVE AND OBJECTIVE BOX
seen and examined  24 h events noted   trach/ vent   VEEG         PAST HISTORY  --------------------------------------------------------------------------------  No significant changes to PMH, PSH, FHx, SHx, unless otherwise noted    ALLERGIES & MEDICATIONS  --------------------------------------------------------------------------------  Allergies    penicillin (Other)    Intolerances      Standing Inpatient Medications  aMIOdarone    Tablet 200 milliGRAM(s) Oral daily  ascorbic acid 500 milliGRAM(s) Oral daily  atorvastatin 80 milliGRAM(s) Oral at bedtime  chlorhexidine 0.12% Liquid 15 milliLiter(s) Oral Mucosa every 12 hours  chlorhexidine 2% Cloths 1 Application(s) Topical <User Schedule>  clonazePAM  Tablet 2 milliGRAM(s) Oral three times a day  collagenase Ointment 1 Application(s) Topical once  collagenase Ointment 1 Application(s) Topical two times a day  Dakins Solution - 1/2 Strength 1 Application(s) Topical every 12 hours  dextrose 5%. 1000 milliLiter(s) IV Continuous <Continuous>  dextrose 5%. 1000 milliLiter(s) IV Continuous <Continuous>  dextrose 50% Injectable 25 Gram(s) IV Push once  dextrose 50% Injectable 12.5 Gram(s) IV Push once  dextrose 50% Injectable 25 Gram(s) IV Push once  doxazosin 4 milliGRAM(s) Oral at bedtime  epoetin guanakito-epbx (RETACRIT) Injectable 71065 Unit(s) SubCutaneous every 7 days  ergocalciferol Drops 4000 Unit(s) Oral every 24 hours  folic acid 1 milliGRAM(s) Oral daily  glucagon  Injectable 1 milliGRAM(s) IV Push once  glucagon  Injectable 1 milliGRAM(s) IntraMuscular once  insulin lispro (ADMELOG) corrective regimen sliding scale   SubCutaneous at bedtime  lacosamide IVPB 100 milliGRAM(s) IV Intermittent every 12 hours  metoclopramide Injectable 5 milliGRAM(s) IV Push three times a day  midodrine 10 milliGRAM(s) Oral every 8 hours  multivitamin 1 Tablet(s) Oral daily  pantoprazole  Injectable 40 milliGRAM(s) IV Push two times a day  polyethylene glycol 3350 17 Gram(s) Oral two times a day  senna 2 Tablet(s) Oral at bedtime  silver sulfADIAZINE 1% Cream 1 Application(s) Topical two times a day  valproate sodium  IVPB 750 milliGRAM(s) IV Intermittent every 8 hours    PRN Inpatient Medications  acetaminophen     Tablet .. 650 milliGRAM(s) Oral every 6 hours PRN  aluminum hydroxide/magnesium hydroxide/simethicone Suspension 30 milliLiter(s) Oral every 4 hours PRN  dextrose Oral Gel 15 Gram(s) Oral once PRN  melatonin 3 milliGRAM(s) Oral at bedtime PRN        VITALS/PHYSICAL EXAM  --------------------------------------------------------------------------------  T(C): 35.7 (08-03-23 @ 04:00), Max: 36.6 (08-02-23 @ 07:48)  HR: 79 (08-03-23 @ 04:00) (79 - 94)  BP: 93/54 (08-03-23 @ 04:00) (93/51 - 120/58)  RR: 20 (08-03-23 @ 04:00) (18 - 20)  SpO2: 100% (08-03-23 @ 04:00) (98% - 100%)  Wt(kg): --        08-01-23 @ 07:01  -  08-02-23 @ 07:00  --------------------------------------------------------  IN: 0 mL / OUT: 600 mL / NET: -600 mL    08-02-23 @ 07:01  -  08-03-23 @ 06:10  --------------------------------------------------------  IN: 0 mL / OUT: 400 mL / NET: -400 mL      Physical Exam:  	Gen: trach/vent  	Abd: +distended  	LE:  edema  	Vascular access:tdc     LABS/STUDIES  --------------------------------------------------------------------------------              7.4    12.84 >-----------<  340      [08-02-23 @ 06:43]              22.9     135  |  99  |  61  ----------------------------<  86      [08-02-23 @ 06:43]  3.5   |  27  |  2.9        Ca     8.5     [08-02-23 @ 06:43]      Mg     2.6     [08-02-23 @ 06:43]      Phos  2.8     [08-02-23 @ 06:43]    TPro  5.2  /  Alb  2.1  /  TBili  0.3  /  DBili  x   /  AST  15  /  ALT  8   /  AlkPhos  130  [08-02-23 @ 06:43]        Creatinine Trend:  SCr 2.9 [08-02 @ 06:43]  SCr 2.6 [08-01 @ 04:30]  SCr 3.5 [07-31 @ 05:54]  SCr 3.0 [07-30 @ 05:50]  SCr 2.6 [07-29 @ 04:30]    Urinalysis - [08-02-23 @ 06:43]      Color  / Appearance  / SG  / pH       Gluc 86 / Ketone   / Bili  / Urobili        Blood  / Protein  / Leuk Est  / Nitrite       RBC  / WBC  / Hyaline  / Gran  / Sq Epi  / Non Sq Epi  / Bacteria       Iron 49, TIBC 138, %sat 36      [07-28-23 @ 05:31]  Ferritin 1199      [07-28-23 @ 05:31]  PTH -- (Ca 8.4)      [08-01-23 @ 04:30]   18  PTH -- (Ca 8.9)      [07-28-23 @ 05:31]   20  Vitamin D (25OH) 42      [08-01-23 @ 04:30]  TSH 5.37      [07-27-23 @ 05:33]  Lipid: chol 72, TG 37, HDL 42, LDL --      [07-27-23 @ 05:33]    HBsAb Nonreact      [07-14-23 @ 10:53]  HBsAg Nonreact      [07-11-23 @ 16:58]  HCV 0.16, Nonreact      [07-11-23 @ 16:58]

## 2023-08-04 NOTE — PROGRESS NOTE ADULT - ASSESSMENT
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  was found also to be hypoglycemic with blood sugar of 10 and had two episodes of tonic clonic seizures in the ED.   Patient non-verbal at baseline . s/p MICU monitoring, now downgraded to SDU    Hypoglycemia possibly due to sulfonyl urea  DM II  - Blood sugar in ed: 10 s/p multiple dextrose pushes /d10  - off standing insulin, on ISS  - monitor FS, avoid sulfonylurea    GTC seizure x2.  status epilepticus   large posterior fossa IPH w/ IVH/SAH/hydrocephalus  Bedbound/Nonverbal at baseline/ Chronical respiratory failure s/p trach/Peg  - Patient s/p Versed 4mg x1, Keppra 1gram IV x1, Ativan 4mg IV x2, with Propofol gtt initiated for seizures   - video EEG: Focal and generalized slowing, Interictal activity, runs of bilateral FP ( higher amplitude from the right) rhythmic activity that appears not be physiological . ( ? of artifact). It appears as questionable low amplitude spike and after going slow with modified morphology( ? possibility of change due the the prior procedure)  - neurocrit and neurosurgery following   s/p vpa bolus  -Neurology f/u appreciated  d/c VEEG  - check free Valproate level  - c/w Clonazepam  3 mg tid   - c/w Vimpat 100 mg bid  - c/w Depakote 750 mg tid  - shunt adjusted by neurosx 7/31, repeat CTH 8/1 reported ( there has been slightly decreased overall ventricular dilation with right parietal ventriculoperitoneal shunt   catheterin appropriate position.  Unchanged thin right frontal convexity subdural collection with minimal leftward midline shift as well as slitlike appearance of the lateral   ventricles with right parietal ventriculoperitoneal shunt in appropriate position. Redemonstration of a wedge-shaped hypodensity involving the left   occipital lobe which may reflect age-indeterminate infarct.)  -As per Neurosurgery-  The plan was to get MRI with CSF flow study to evaluate the aqueductal hydrocephalus once improved   but today  as per NSG  no further Neurosurgical intervention needed at this time reconsult PRN   - vent management per pulm   Levophed statrted for hypotension and small bolus given overnight   c/w levophed , PAN culture and ID consult and start epimeric Abcx       ESRD on Dialysis Monday/THursday   Acute Renal Failure, started on HD 6/1/23  Anemia of chronic disease  - daily BMP monitoring, daily weight, daily strict I/O   - s/p Tesio cath on 7/7/23  - HD per renal   - s/p 1u PRBC since admission, monitor CBC/Keep active T&S    Afib w/ RVR  -now rate controlled, on amiodarone, off AC due to brain bleed    Multiple sacral wounds and buttock   - burn team following, Wound care - Santyl/ Moist Kerlex packing with 1/4 Dakins / DPD BID to sacrum and left ischium  Silvadene/DPD BID to Rt Ischium, no surgical intervention   frequent turning, off loading, and positioning and skin care as per protocol, Maintain pressure injury prevention, Keep skin clean, Offload heels, Monitor wound for changes and notify provider if any   - nutrition support on board     DNR, overall prognosis is very poor - palliative team on board     I discussed with the patient wife as mentioned above  and she stated that she is coming today with her daughter and the plan to make him CMO when they arrive   palliative team updated     pending: neuro/ palliative / nephro

## 2023-08-04 NOTE — PROGRESS NOTE ADULT - PROBLEM SELECTOR PLAN 3
HD started in June 2023. Has required 1u PRBC this hospitalization  -continue HD for now  -f/u renal  -monitor BMP, CBC  -per primary team discussion with family, possible plan for palliative removal of vent and CMO after family visits today
HD started in June 2023. Has required 1u PRBC this hospitalization  -continue HD  -f/u renal  -monitor BMP, CBC.

## 2023-08-04 NOTE — PROGRESS NOTE ADULT - SUBJECTIVE AND OBJECTIVE BOX
ALICIA BARBOUR 63y Male  MRN#: [id number]     Hospital Day: 9d    Pt is currently admitted with the primary diagnosis of     Overnight events   -No major overnight events                                          ----------------------------------------------------------  OBJECTIVE  PAST MEDICAL & SURGICAL HISTORY  HTN (hypertension)    Diabetes mellitus    H/O intracranial hemorrhage    H/O tracheostomy    PEG (percutaneous endoscopic gastrostomy) status    Hyperlipidemia                                              -----------------------------------------------------------  MEDICATIONS:  STANDING MEDICATIONS  aMIOdarone    Tablet 200 milliGRAM(s) Oral daily  ascorbic acid 500 milliGRAM(s) Oral daily  atorvastatin 80 milliGRAM(s) Oral at bedtime  chlorhexidine 0.12% Liquid 15 milliLiter(s) Oral Mucosa every 12 hours  chlorhexidine 2% Cloths 1 Application(s) Topical <User Schedule>  clonazePAM  Tablet 3 milliGRAM(s) Oral three times a day  collagenase Ointment 1 Application(s) Topical once  collagenase Ointment 1 Application(s) Topical two times a day  Dakins Solution - 1/2 Strength 1 Application(s) Topical every 12 hours  dextrose 5%. 1000 milliLiter(s) IV Continuous <Continuous>  dextrose 5%. 1000 milliLiter(s) IV Continuous <Continuous>  dextrose 50% Injectable 25 Gram(s) IV Push once  dextrose 50% Injectable 12.5 Gram(s) IV Push once  dextrose 50% Injectable 25 Gram(s) IV Push once  epoetin guanakito-epbx (RETACRIT) Injectable 10270 Unit(s) SubCutaneous every 7 days  ergocalciferol Drops 1000 Unit(s) Oral daily  folic acid 1 milliGRAM(s) Oral daily  glucagon  Injectable 1 milliGRAM(s) IV Push once  glucagon  Injectable 1 milliGRAM(s) IntraMuscular once  insulin lispro (ADMELOG) corrective regimen sliding scale   SubCutaneous at bedtime  lacosamide IVPB 100 milliGRAM(s) IV Intermittent every 12 hours  metoclopramide Injectable 5 milliGRAM(s) IV Push three times a day  midodrine 10 milliGRAM(s) Oral every 8 hours  multivitamin 1 Tablet(s) Oral daily  norepinephrine Infusion 0.05 MICROgram(s)/kG/Min IV Continuous <Continuous>  pantoprazole  Injectable 40 milliGRAM(s) IV Push two times a day  polyethylene glycol 3350 17 Gram(s) Oral two times a day  senna 2 Tablet(s) Oral at bedtime  silver sulfADIAZINE 1% Cream 1 Application(s) Topical two times a day  tamsulosin 0.4 milliGRAM(s) Oral at bedtime  valproate sodium  IVPB 750 milliGRAM(s) IV Intermittent every 8 hours    PRN MEDICATIONS  acetaminophen     Tablet .. 650 milliGRAM(s) Oral every 6 hours PRN  aluminum hydroxide/magnesium hydroxide/simethicone Suspension 30 milliLiter(s) Oral every 4 hours PRN  dextrose Oral Gel 15 Gram(s) Oral once PRN  melatonin 3 milliGRAM(s) Oral at bedtime PRN                                            ------------------------------------------------------------  VITAL SIGNS: Last 24 Hours  T(C): 36.2 (04 Aug 2023 11:20), Max: 36.7 (04 Aug 2023 04:00)  T(F): 97.1 (04 Aug 2023 11:20), Max: 98.1 (04 Aug 2023 04:00)  HR: 99 (04 Aug 2023 14:00) (84 - 99)  BP: 89/74 (04 Aug 2023 11:20) (64/40 - 104/53)  BP(mean): 64 (04 Aug 2023 11:20) (49 - 66)  RR: 20 (04 Aug 2023 11:20) (16 - 20)  SpO2: 100% (04 Aug 2023 14:00) (97% - 100%)      08-03-23 @ 07:01  -  08-04-23 @ 07:00  --------------------------------------------------------  IN: 601.4 mL / OUT: 1400 mL / NET: -798.6 mL    08-04-23 @ 07:01  -  08-04-23 @ 14:43  --------------------------------------------------------  IN: 52.8 mL / OUT: 0 mL / NET: 52.8 mL                                             --------------------------------------------------------------  LABS:                        9.0    13.65 )-----------( 191      ( 04 Aug 2023 06:20 )             28.8     08-04    138  |  102  |  47<H>  ----------------------------<  83  4.5   |  23  |  2.3<H>    Ca    8.8      04 Aug 2023 06:20  Phos  2.2     08-04  Mg     2.4     08-04    TPro  5.3<L>  /  Alb  2.1<L>  /  TBili  0.3  /  DBili  x   /  AST  20  /  ALT  6   /  AlkPhos  131<H>  08-04      Urinalysis Basic - ( 04 Aug 2023 06:20 )    Color: x / Appearance: x / SG: x / pH: x  Gluc: 83 mg/dL / Ketone: x  / Bili: x / Urobili: x   Blood: x / Protein: x / Nitrite: x   Leuk Esterase: x / RBC: x / WBC x   Sq Epi: x / Non Sq Epi: x / Bacteria: x                                                            --------------------------------------------------------------  PHYSICAL EXAM:  GENERAL: NAD  HEENT: trach, drooling thick secretions from mouth  LUNGS: apicals clear  HEART: S1/S2  ABD: Soft  EXT: L arm not twitching this AM  SKIN: swollen arms and legs      PLAN:    # DVT prophylaxis     # GI prophylaxis     # Diet     # Activity Score (AM-PAC)    #Progress Note Handoff  Pending (specify):  Family discussion:   Disposition:

## 2023-08-04 NOTE — CHART NOTE - NSCHARTNOTEFT_GEN_A_CORE
Goals of care care initiated with Patricio Camacho.  I informed her of Mr. Camacho's worsening clinical status which may necessitate further invasive procedures such as central line and arterial line insertion.  Patrciio was counselled on the patients ongoing deteriotation and after discussion decided to transition care to "CMO," understanding that it entail withdrawing the ventilator, stopping HD, and stopping all other life-prolonging interventions.  Patricio wished to be at the bedside before the ventilator was withdrawn, so the she could be with the patient. MOLST form updated and placed in chart.

## 2023-08-04 NOTE — PROGRESS NOTE ADULT - SUBJECTIVE AND OBJECTIVE BOX
T H I S   I S    N O  T   A    F I N A L I Z E D   N O T ALICIA DENTON  63y, Male  Allergy: penicillin (Other)    Hospital Day: 9d    Patient seen and examined earlier today.     PMH/PSH:  PAST MEDICAL & SURGICAL HISTORY:  HTN (hypertension)      Diabetes mellitus      H/O intracranial hemorrhage      H/O tracheostomy      PEG (percutaneous endoscopic gastrostomy) status      Hyperlipidemia          LAST 24-Hr EVENTS:    VITALS:  T(F): 98.1 (08-04-23 @ 04:00), Max: 98.1 (08-04-23 @ 04:00)  HR: 85 (08-04-23 @ 08:04)  BP: 92/53 (08-04-23 @ 07:32) (81/49 - 104/53)  RR: 16 (08-04-23 @ 04:00)  SpO2: 97% (08-04-23 @ 08:04)  FiO2: 40        TESTS & MEASUREMENTS:  Weight/BMI      08-02-23 @ 07:01  -  08-03-23 @ 07:00  --------------------------------------------------------  IN: 0 mL / OUT: 400 mL / NET: -400 mL    08-03-23 @ 07:01  -  08-04-23 @ 07:00  --------------------------------------------------------  IN: 575 mL / OUT: 1400 mL / NET: -825 mL                            9.0    13.65 )-----------( 191      ( 04 Aug 2023 06:20 )             28.8         08-04    138  |  102  |  47<H>  ----------------------------<  83  4.5   |  23  |  2.3<H>    Ca    8.8      04 Aug 2023 06:20  Phos  2.2     08-04  Mg     2.4     08-04    TPro  5.3<L>  /  Alb  2.1<L>  /  TBili  0.3  /  DBili  x   /  AST  20  /  ALT  6   /  AlkPhos  131<H>  08-04    LIVER FUNCTIONS - ( 04 Aug 2023 06:20 )  Alb: 2.1 g/dL / Pro: 5.3 g/dL / ALK PHOS: 131 U/L / ALT: 6 U/L / AST: 20 U/L / GGT: x                 Urinalysis Basic - ( 04 Aug 2023 06:20 )    Color: x / Appearance: x / SG: x / pH: x  Gluc: 83 mg/dL / Ketone: x  / Bili: x / Urobili: x   Blood: x / Protein: x / Nitrite: x   Leuk Esterase: x / RBC: x / WBC x   Sq Epi: x / Non Sq Epi: x / Bacteria: x      Procalcitonin, Serum: 0.47 ng/mL (07-27-23 @ 05:33)      Ferritin: 1199 ng/mL (07-28-23 @ 05:31)            A1C with Estimated Average Glucose Result: 5.7 % (07-27-23 @ 05:33)  A1C with Estimated Average Glucose Result: 6.2 % (05-31-23 @ 05:50)          RADIOLOGY, ECG, & ADDITIONAL TESTS:  12 Lead ECG:   Ventricular Rate 84 BPM    Atrial Rate 84 BPM    P-R Interval 108 ms    QRS Duration 90 ms    Q-T Interval 356 ms    QTC Calculation(Bazett) 420 ms    R Axis 22 degrees    T Axis 16 degrees    Diagnosis Line Sinus rhythm with short ME  Otherwise normal ECG    Confirmed by MACIEL BORREGO MD ([id number]) on 7/12/2023 6:50:09 AM (07-12-23 @ 02:18)    CT Head No Cont:   ACC: [id number] EXAM:  CT BRAIN   ORDERED BY: EDYTA RICHARDS     PROCEDURE DATE:  08/01/2023          INTERPRETATION:  CLINICAL INDICATION: Ventricular peritoneal shunt   reprogramming.    Technique: CT of the head was performed without contrast.    Multiple contiguous axial images were acquired from the skullbase to the   vertex without the administration of intravenous contrast.  Coronal and   sagittal reformations were made.    COMPARISON:  prior head CT dated 7/30/2023.    FINDINGS:      EEG leads are noted overlying scalp causing streak artifact limiting   evaluation..    Since the prior examination there has been overall decreased lateral and   third ventricular dilation.    Patient is again noted to be status post suboccipital craniectomy with   essentially unchanged fluid collection noted at the craniectomy site, 4.4   x 1.4 cm. Unchanged hyperdensity anterior to the fluid collection at the   craniectomy site. Stable positioning of a right parietal approach   ventricular peritoneal shunt catheter, terminating near the foramen of   Monro. The lateral ventricles are slitlike in size, unchanged from prior.   There is similar ex vacuo dilation of the fourth ventricle and cerebral   aqueduct    Unchanged hypodense subdural collection along the right frontal convexity   measuring approximately 7 mm in thickness with approximately 3 mm right   to left midline shift.    Redemonstrated hypodensity in the left occipital lobe is without change.   Unchanged left high parietal hypodensity, likely reflecting chronic   infarct though new since the remote examinations.    Stable mucosal thickening in the right sphenoid sinus. Mild mucosal   thickening in the partially visualized left maxillary sinus. Complete   opacification of thebilateral mastoid air cells, unchanged. The   visualized orbits are unremarkable.      IMPRESSION:  Since the prior examination there has been slightly decreased overall   ventricular dilation with right parietal ventriculoperitoneal shunt   catheterin appropriate position.    Unchanged thin right frontal convexity subdural collection with minimal   leftward midline shift as well as slitlike appearance of the lateral   ventricles with right parietal ventriculoperitoneal shunt in appropriate   position.    Redemonstration of a wedge-shaped hypodensity involving the left   occipital lobe which may reflect age-indeterminate infarct.      --- End of Report ---            JANIE MCMILLAN MD; Attending Radiologist  This document has been electronically signed. Aug  1 2023  5:47PM (08-01-23 @ 16:39)  CT Head No Cont:   ACC: [id number] EXAM:  CT BRAIN   ORDERED BY: MELITON METZGER     PROCEDURE DATE:  07/30/2023          INTERPRETATION:  CLINICAL INDICATION: Follow-up shunt    TECHNIQUE: CT of the head was performed without the administration of   intravenous contrast.    COMPARISON: CT head 7/29/2023    FINDINGS:    Evaluation is somewhat limited by streak artifact from EEG leads.    Ventriculostomy catheter is seen traversing the right parietal region   with tip terminating in the right lateral ventricle, stable in position.   No evidence of pericatheter hemorrhage. There is slight further   enlargement of the supratentorial ventricles since the prior CT head from   7/29/2023. Stable dilatation of the fourth ventricle and the cerebral   aqueduct.    Stable volume of the thin subdural hemorrhages along the right convexity.   Nearly resolved subdural hemorrhage along the left convexity. Stable   minimal leftward midline shift.    Stable suboccipital craniectomy and evolving bilateral cerebellar   encephalomalacia. 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   Stable hyperdensity anterior to the CSF collection, unchanged since the   prior CT from 11/4/2021.    Stable left occipital patchy hypodensity, likely representing subacute   infarct.    There is no evidence of new acute territorial infarct or intracranial   hemorrhage.    The visualized intraorbital contents are normal. Partial opacification of   the right sphenoid sinus. Mild mucosal thickening of the left sphenoid   sinus. Complete opacification of the bilateral mastoid and middle ear   cavities.    IMPRESSION:    Slight further enlargement of the supratentorial ventricles since the   prior CT head from 7/29/2023. This could reflect reexpansion of the   ventricles versus mild hydrocephalus. Stable positioning of the right   parietal approach ventriculostomy catheter.    Unchanged thin subdural hemorrhage along the right convexity and minimal   midline shift to the left.    Stable suboccipital craniectomy and small pseudomeningocele.    Redemonstrated small left occipital patchy hyperdensity which could   reflect subacute infarct.    --- End of Report ---          JAZMÍN RICE MD; Resident Radiologist  This document has been electronically signed.  PALOMO WILKINSON MD; Attending Radiologist  This document has been electronically signed. Jul 31 2023  2:49PM (07-30-23 @ 15:58)    RECENT DIAGNOSTIC ORDERS:  EEG w/ Video Each 12-26 Hours, Unmonitored (08-03-23 @ 14:12)      MEDICATIONS:  MEDICATIONS  (STANDING):  aMIOdarone    Tablet 200 milliGRAM(s) Oral daily  ascorbic acid 500 milliGRAM(s) Oral daily  atorvastatin 80 milliGRAM(s) Oral at bedtime  chlorhexidine 0.12% Liquid 15 milliLiter(s) Oral Mucosa every 12 hours  chlorhexidine 2% Cloths 1 Application(s) Topical <User Schedule>  clonazePAM  Tablet 3 milliGRAM(s) Oral three times a day  collagenase Ointment 1 Application(s) Topical once  collagenase Ointment 1 Application(s) Topical two times a day  Dakins Solution - 1/2 Strength 1 Application(s) Topical every 12 hours  dextrose 5%. 1000 milliLiter(s) (100 mL/Hr) IV Continuous <Continuous>  dextrose 5%. 1000 milliLiter(s) (50 mL/Hr) IV Continuous <Continuous>  dextrose 50% Injectable 25 Gram(s) IV Push once  dextrose 50% Injectable 12.5 Gram(s) IV Push once  dextrose 50% Injectable 25 Gram(s) IV Push once  epoetin guanakito-epbx (RETACRIT) Injectable 70030 Unit(s) SubCutaneous every 7 days  ergocalciferol Drops 1000 Unit(s) Oral daily  folic acid 1 milliGRAM(s) Oral daily  glucagon  Injectable 1 milliGRAM(s) IV Push once  glucagon  Injectable 1 milliGRAM(s) IntraMuscular once  insulin lispro (ADMELOG) corrective regimen sliding scale   SubCutaneous at bedtime  lacosamide IVPB 100 milliGRAM(s) IV Intermittent every 12 hours  lactated ringers Bolus 500 milliLiter(s) IV Bolus once  metoclopramide Injectable 5 milliGRAM(s) IV Push three times a day  midodrine 10 milliGRAM(s) Oral every 8 hours  multivitamin 1 Tablet(s) Oral daily  norepinephrine Infusion 0.05 MICROgram(s)/kG/Min (8.78 mL/Hr) IV Continuous <Continuous>  pantoprazole  Injectable 40 milliGRAM(s) IV Push two times a day  polyethylene glycol 3350 17 Gram(s) Oral two times a day  senna 2 Tablet(s) Oral at bedtime  silver sulfADIAZINE 1% Cream 1 Application(s) Topical two times a day  tamsulosin 0.4 milliGRAM(s) Oral at bedtime  valproate sodium  IVPB 750 milliGRAM(s) IV Intermittent every 8 hours    MEDICATIONS  (PRN):  acetaminophen     Tablet .. 650 milliGRAM(s) Oral every 6 hours PRN Temp greater or equal to 38C (100.4F), Mild Pain (1 - 3)  aluminum hydroxide/magnesium hydroxide/simethicone Suspension 30 milliLiter(s) Oral every 4 hours PRN Dyspepsia  dextrose Oral Gel 15 Gram(s) Oral once PRN Blood Glucose LESS THAN 70 milliGRAM(s)/deciliter  melatonin 3 milliGRAM(s) Oral at bedtime PRN Insomnia      HOME MEDICATIONS:  acetaminophen 325 mg oral tablet (07-27)  albuterol 2.5 mg/3 mL (0.083%) inhalation solution (07-27)  Amaryl 1 mg oral tablet (07-27)  amiodarone 200 mg oral tablet (07-27)  ascorbic acid 500 mg oral tablet (07-27)  aspirin 81 mg oral tablet, chewable (07-27)  atorvastatin 80 mg oral tablet (07-27)  Atrovent 18 mcg/inh inhalation aerosol (07-27)  bumetanide 2 mg oral tablet (07-27)  chlorhexidine 0.12% mucous membrane liquid (07-27)  doxazosin 4 mg oral tablet (07-27)  ergocalciferol 200 mcg/mL (8000 intl units/mL) oral solution (07-27)  ferrous sulfate 220 mg/5 mL (44 mg/5 mL elemental iron) oral elixir (07-27)  folic acid 1 mg oral tablet (07-27)  Keppra 100 mg/mL oral solution (07-27)  Multiple Vitamins oral tablet (07-27)  pantoprazole 40 mg oral delayed release tablet (07-27)  polyethylene glycol 3350 oral powder for reconstitution (07-27)  Retacrit 40,000 units/mL preservative-free injectable solution (07-27)  SSD 1% topical cream (07-27)  Venofer 20 mg/mL intravenous solution (07-27)  zinc sulfate 220 mg oral tablet (07-27)      PHYSICAL EXAM:  GENERAL:   CHEST/LUNG:   HEART:   ABDOMEN:   EXTREMITIES:             ALICIA BARBOUR  63y, Male  Allergy: penicillin (Other)    Hospital Day: 9d    Patient seen and examined earlier today. overnight events noted - RRT called for hypotension and IV access     PMH/PSH:  PAST MEDICAL & SURGICAL HISTORY:  HTN (hypertension)      Diabetes mellitus      H/O intracranial hemorrhage      H/O tracheostomy      PEG (percutaneous endoscopic gastrostomy) status      Hyperlipidemia          LAST 24-Hr EVENTS:    VITALS:  T(F): 98.1 (08-04-23 @ 04:00), Max: 98.1 (08-04-23 @ 04:00)  HR: 85 (08-04-23 @ 08:04)  BP: 92/53 (08-04-23 @ 07:32) (81/49 - 104/53)  RR: 16 (08-04-23 @ 04:00)  SpO2: 97% (08-04-23 @ 08:04)  FiO2: 40        TESTS & MEASUREMENTS:  Weight/BMI      08-02-23 @ 07:01  -  08-03-23 @ 07:00  --------------------------------------------------------  IN: 0 mL / OUT: 400 mL / NET: -400 mL    08-03-23 @ 07:01  -  08-04-23 @ 07:00  --------------------------------------------------------  IN: 575 mL / OUT: 1400 mL / NET: -825 mL                            9.0    13.65 )-----------( 191      ( 04 Aug 2023 06:20 )             28.8         08-04    138  |  102  |  47<H>  ----------------------------<  83  4.5   |  23  |  2.3<H>    Ca    8.8      04 Aug 2023 06:20  Phos  2.2     08-04  Mg     2.4     08-04    TPro  5.3<L>  /  Alb  2.1<L>  /  TBili  0.3  /  DBili  x   /  AST  20  /  ALT  6   /  AlkPhos  131<H>  08-04    LIVER FUNCTIONS - ( 04 Aug 2023 06:20 )  Alb: 2.1 g/dL / Pro: 5.3 g/dL / ALK PHOS: 131 U/L / ALT: 6 U/L / AST: 20 U/L / GGT: x                 Urinalysis Basic - ( 04 Aug 2023 06:20 )    Color: x / Appearance: x / SG: x / pH: x  Gluc: 83 mg/dL / Ketone: x  / Bili: x / Urobili: x   Blood: x / Protein: x / Nitrite: x   Leuk Esterase: x / RBC: x / WBC x   Sq Epi: x / Non Sq Epi: x / Bacteria: x      Procalcitonin, Serum: 0.47 ng/mL (07-27-23 @ 05:33)      Ferritin: 1199 ng/mL (07-28-23 @ 05:31)            A1C with Estimated Average Glucose Result: 5.7 % (07-27-23 @ 05:33)  A1C with Estimated Average Glucose Result: 6.2 % (05-31-23 @ 05:50)          RADIOLOGY, ECG, & ADDITIONAL TESTS:  12 Lead ECG:   Ventricular Rate 84 BPM    Atrial Rate 84 BPM    P-R Interval 108 ms    QRS Duration 90 ms    Q-T Interval 356 ms    QTC Calculation(Bazett) 420 ms    R Axis 22 degrees    T Axis 16 degrees    Diagnosis Line Sinus rhythm with short NE  Otherwise normal ECG    Confirmed by MACIEL BORREGO MD ([id number]) on 7/12/2023 6:50:09 AM (07-12-23 @ 02:18)    CT Head No Cont:   ACC: [id number] EXAM:  CT BRAIN   ORDERED BY: EDYTA RICHARDS     PROCEDURE DATE:  08/01/2023          INTERPRETATION:  CLINICAL INDICATION: Ventricular peritoneal shunt   reprogramming.    Technique: CT of the head was performed without contrast.    Multiple contiguous axial images were acquired from the skullbase to the   vertex without the administration of intravenous contrast.  Coronal and   sagittal reformations were made.    COMPARISON:  prior head CT dated 7/30/2023.    FINDINGS:      EEG leads are noted overlying scalp causing streak artifact limiting   evaluation..    Since the prior examination there has been overall decreased lateral and   third ventricular dilation.    Patient is again noted to be status post suboccipital craniectomy with   essentially unchanged fluid collection noted at the craniectomy site, 4.4   x 1.4 cm. Unchanged hyperdensity anterior to the fluid collection at the   craniectomy site. Stable positioning of a right parietal approach   ventricular peritoneal shunt catheter, terminating near the foramen of   Monro. The lateral ventricles are slitlike in size, unchanged from prior.   There is similar ex vacuo dilation of the fourth ventricle and cerebral   aqueduct    Unchanged hypodense subdural collection along the right frontal convexity   measuring approximately 7 mm in thickness with approximately 3 mm right   to left midline shift.    Redemonstrated hypodensity in the left occipital lobe is without change.   Unchanged left high parietal hypodensity, likely reflecting chronic   infarct though new since the remote examinations.    Stable mucosal thickening in the right sphenoid sinus. Mild mucosal   thickening in the partially visualized left maxillary sinus. Complete   opacification of thebilateral mastoid air cells, unchanged. The   visualized orbits are unremarkable.      IMPRESSION:  Since the prior examination there has been slightly decreased overall   ventricular dilation with right parietal ventriculoperitoneal shunt   catheterin appropriate position.    Unchanged thin right frontal convexity subdural collection with minimal   leftward midline shift as well as slitlike appearance of the lateral   ventricles with right parietal ventriculoperitoneal shunt in appropriate   position.    Redemonstration of a wedge-shaped hypodensity involving the left   occipital lobe which may reflect age-indeterminate infarct.      --- End of Report ---            JANIE MCMILLAN MD; Attending Radiologist  This document has been electronically signed. Aug  1 2023  5:47PM (08-01-23 @ 16:39)  CT Head No Cont:   ACC: [id number] EXAM:  CT BRAIN   ORDERED BY: MELITON METZGER     PROCEDURE DATE:  07/30/2023          INTERPRETATION:  CLINICAL INDICATION: Follow-up shunt    TECHNIQUE: CT of the head was performed without the administration of   intravenous contrast.    COMPARISON: CT head 7/29/2023    FINDINGS:    Evaluation is somewhat limited by streak artifact from EEG leads.    Ventriculostomy catheter is seen traversing the right parietal region   with tip terminating in the right lateral ventricle, stable in position.   No evidence of pericatheter hemorrhage. There is slight further   enlargement of the supratentorial ventricles since the prior CT head from   7/29/2023. Stable dilatation of the fourth ventricle and the cerebral   aqueduct.    Stable volume of the thin subdural hemorrhages along the right convexity.   Nearly resolved subdural hemorrhage along the left convexity. Stable   minimal leftward midline shift.    Stable suboccipital craniectomy and evolving bilateral cerebellar   encephalomalacia. 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   Stable hyperdensity anterior to the CSF collection, unchanged since the   prior CT from 11/4/2021.    Stable left occipital patchy hypodensity, likely representing subacute   infarct.    There is no evidence of new acute territorial infarct or intracranial   hemorrhage.    The visualized intraorbital contents are normal. Partial opacification of   the right sphenoid sinus. Mild mucosal thickening of the left sphenoid   sinus. Complete opacification of the bilateral mastoid and middle ear   cavities.    IMPRESSION:    Slight further enlargement of the supratentorial ventricles since the   prior CT head from 7/29/2023. This could reflect reexpansion of the   ventricles versus mild hydrocephalus. Stable positioning of the right   parietal approach ventriculostomy catheter.    Unchanged thin subdural hemorrhage along the right convexity and minimal   midline shift to the left.    Stable suboccipital craniectomy and small pseudomeningocele.    Redemonstrated small left occipital patchy hyperdensity which could   reflect subacute infarct.    --- End of Report ---          JAZMÍN RICE MD; Resident Radiologist  This document has been electronically signed.  PALOMO WILKINSON MD; Attending Radiologist  This document has been electronically signed. Jul 31 2023  2:49PM (07-30-23 @ 15:58)    RECENT DIAGNOSTIC ORDERS:  EEG w/ Video Each 12-26 Hours, Unmonitored (08-03-23 @ 14:12)      MEDICATIONS:  MEDICATIONS  (STANDING):  aMIOdarone    Tablet 200 milliGRAM(s) Oral daily  ascorbic acid 500 milliGRAM(s) Oral daily  atorvastatin 80 milliGRAM(s) Oral at bedtime  chlorhexidine 0.12% Liquid 15 milliLiter(s) Oral Mucosa every 12 hours  chlorhexidine 2% Cloths 1 Application(s) Topical <User Schedule>  clonazePAM  Tablet 3 milliGRAM(s) Oral three times a day  collagenase Ointment 1 Application(s) Topical once  collagenase Ointment 1 Application(s) Topical two times a day  Dakins Solution - 1/2 Strength 1 Application(s) Topical every 12 hours  dextrose 5%. 1000 milliLiter(s) (100 mL/Hr) IV Continuous <Continuous>  dextrose 5%. 1000 milliLiter(s) (50 mL/Hr) IV Continuous <Continuous>  dextrose 50% Injectable 25 Gram(s) IV Push once  dextrose 50% Injectable 12.5 Gram(s) IV Push once  dextrose 50% Injectable 25 Gram(s) IV Push once  epoetin guanakito-epbx (RETACRIT) Injectable 44628 Unit(s) SubCutaneous every 7 days  ergocalciferol Drops 1000 Unit(s) Oral daily  folic acid 1 milliGRAM(s) Oral daily  glucagon  Injectable 1 milliGRAM(s) IV Push once  glucagon  Injectable 1 milliGRAM(s) IntraMuscular once  insulin lispro (ADMELOG) corrective regimen sliding scale   SubCutaneous at bedtime  lacosamide IVPB 100 milliGRAM(s) IV Intermittent every 12 hours  lactated ringers Bolus 500 milliLiter(s) IV Bolus once  metoclopramide Injectable 5 milliGRAM(s) IV Push three times a day  midodrine 10 milliGRAM(s) Oral every 8 hours  multivitamin 1 Tablet(s) Oral daily  norepinephrine Infusion 0.05 MICROgram(s)/kG/Min (8.78 mL/Hr) IV Continuous <Continuous>  pantoprazole  Injectable 40 milliGRAM(s) IV Push two times a day  polyethylene glycol 3350 17 Gram(s) Oral two times a day  senna 2 Tablet(s) Oral at bedtime  silver sulfADIAZINE 1% Cream 1 Application(s) Topical two times a day  tamsulosin 0.4 milliGRAM(s) Oral at bedtime  valproate sodium  IVPB 750 milliGRAM(s) IV Intermittent every 8 hours    MEDICATIONS  (PRN):  acetaminophen     Tablet .. 650 milliGRAM(s) Oral every 6 hours PRN Temp greater or equal to 38C (100.4F), Mild Pain (1 - 3)  aluminum hydroxide/magnesium hydroxide/simethicone Suspension 30 milliLiter(s) Oral every 4 hours PRN Dyspepsia  dextrose Oral Gel 15 Gram(s) Oral once PRN Blood Glucose LESS THAN 70 milliGRAM(s)/deciliter  melatonin 3 milliGRAM(s) Oral at bedtime PRN Insomnia      HOME MEDICATIONS:  acetaminophen 325 mg oral tablet (07-27)  albuterol 2.5 mg/3 mL (0.083%) inhalation solution (07-27)  Amaryl 1 mg oral tablet (07-27)  amiodarone 200 mg oral tablet (07-27)  ascorbic acid 500 mg oral tablet (07-27)  aspirin 81 mg oral tablet, chewable (07-27)  atorvastatin 80 mg oral tablet (07-27)  Atrovent 18 mcg/inh inhalation aerosol (07-27)  bumetanide 2 mg oral tablet (07-27)  chlorhexidine 0.12% mucous membrane liquid (07-27)  doxazosin 4 mg oral tablet (07-27)  ergocalciferol 200 mcg/mL (8000 intl units/mL) oral solution (07-27)  ferrous sulfate 220 mg/5 mL (44 mg/5 mL elemental iron) oral elixir (07-27)  folic acid 1 mg oral tablet (07-27)  Keppra 100 mg/mL oral solution (07-27)  Multiple Vitamins oral tablet (07-27)  pantoprazole 40 mg oral delayed release tablet (07-27)  polyethylene glycol 3350 oral powder for reconstitution (07-27)  Retacrit 40,000 units/mL preservative-free injectable solution (07-27)  SSD 1% topical cream (07-27)  Venofer 20 mg/mL intravenous solution (07-27)  zinc sulfate 220 mg oral tablet (07-27)      PHYSICAL EXAM:  General: non verbal, non responsive,  chronic ill appearance, connected to VEEG   Lungs:  decrease breath sounds b/l, trach on mech vent   Heart: regular ryhthm   Abdomen: soft, non tender non distended + PEG, condom cath, digni shield   Ext: anasarca  , LUE intermittent twitching or jerky like

## 2023-08-04 NOTE — CONSULT NOTE ADULT - SUBJECTIVE AND OBJECTIVE BOX
ANGLE ALICIA  63y, Male  Allergy: penicillin (Other)      All historical available data reviewed.    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    Blood work was done at nursing [place] and found out that hemoglobin was 6.4. Patient was recently started on Glimeperide at the NH.  No sign recent fevers, bleeding per PEG, thickening of the sputum or hematochezia.    In the ED the patient was hemodynamically unstable and in status epilepticus.  Versed, Ativan, keppra and propofol were given in the ED and patient was started on Valproic acid as per NeuroCrit team  MAP less than 65 patient was started on Levophed after central line insertion  Labs were significant for mild leukocytosis, severe hypoglycemia and Hemoglobin 7,2    Patient admitted for status epilepticus secondary to hypoglycemia and possible sepsis (27 Jul 2023 01:00)    FAMILY HISTORY:    PAST MEDICAL & SURGICAL HISTORY:  HTN (hypertension)      Diabetes mellitus      H/O intracranial hemorrhage      H/O tracheostomy      PEG (percutaneous endoscopic gastrostomy) status      Hyperlipidemia            VITALS:  T(F): 97.1, Max: 98.1 (08-04-23 @ 04:00)  HR: 97  BP: 89/74  RR: 20Vital Signs Last 24 Hrs  T(C): 36.2 (04 Aug 2023 11:20), Max: 36.7 (04 Aug 2023 04:00)  T(F): 97.1 (04 Aug 2023 11:20), Max: 98.1 (04 Aug 2023 04:00)  HR: 97 (04 Aug 2023 11:20) (84 - 97)  BP: 89/74 (04 Aug 2023 11:20) (64/40 - 104/53)  BP(mean): 64 (04 Aug 2023 11:20) (49 - 66)  RR: 20 (04 Aug 2023 11:20) (16 - 20)  SpO2: 99% (04 Aug 2023 11:20) (97% - 100%)    Parameters below as of 04 Aug 2023 11:20  Patient On (Oxygen Delivery Method): ventilator        TESTS & MEASUREMENTS:                        9.0    13.65 )-----------( 191      ( 04 Aug 2023 06:20 )             28.8     08-04    138  |  102  |  47<H>  ----------------------------<  83  4.5   |  23  |  2.3<H>    Ca    8.8      04 Aug 2023 06:20  Phos  2.2     08-04  Mg     2.4     08-04    TPro  5.3<L>  /  Alb  2.1<L>  /  TBili  0.3  /  DBili  x   /  AST  20  /  ALT  6   /  AlkPhos  131<H>  08-04    LIVER FUNCTIONS - ( 04 Aug 2023 06:20 )  Alb: 2.1 g/dL / Pro: 5.3 g/dL / ALK PHOS: 131 U/L / ALT: 6 U/L / AST: 20 U/L / GGT: x             Urinalysis Basic - ( 04 Aug 2023 06:20 )    Color: x / Appearance: x / SG: x / pH: x  Gluc: 83 mg/dL / Ketone: x  / Bili: x / Urobili: x   Blood: x / Protein: x / Nitrite: x   Leuk Esterase: x / RBC: x / WBC x   Sq Epi: x / Non Sq Epi: x / Bacteria: x          RADIOLOGY & ADDITIONAL TESTS:  Personal review of radiological diagnostics performed  Echo and EKG results noted when applicable.     MEDICATIONS:  acetaminophen     Tablet .. 650 milliGRAM(s) Oral every 6 hours PRN  aluminum hydroxide/magnesium hydroxide/simethicone Suspension 30 milliLiter(s) Oral every 4 hours PRN  aMIOdarone    Tablet 200 milliGRAM(s) Oral daily  ascorbic acid 500 milliGRAM(s) Oral daily  atorvastatin 80 milliGRAM(s) Oral at bedtime  chlorhexidine 0.12% Liquid 15 milliLiter(s) Oral Mucosa every 12 hours  chlorhexidine 2% Cloths 1 Application(s) Topical <User Schedule>  clonazePAM  Tablet 3 milliGRAM(s) Oral three times a day  collagenase Ointment 1 Application(s) Topical once  collagenase Ointment 1 Application(s) Topical two times a day  Dakins Solution - 1/2 Strength 1 Application(s) Topical every 12 hours  dextrose 5%. 1000 milliLiter(s) IV Continuous <Continuous>  dextrose 5%. 1000 milliLiter(s) IV Continuous <Continuous>  dextrose 50% Injectable 25 Gram(s) IV Push once  dextrose 50% Injectable 12.5 Gram(s) IV Push once  dextrose 50% Injectable 25 Gram(s) IV Push once  dextrose Oral Gel 15 Gram(s) Oral once PRN  epoetin guanakito-epbx (RETACRIT) Injectable 91603 Unit(s) SubCutaneous every 7 days  ergocalciferol Drops 1000 Unit(s) Oral daily  folic acid 1 milliGRAM(s) Oral daily  glucagon  Injectable 1 milliGRAM(s) IV Push once  glucagon  Injectable 1 milliGRAM(s) IntraMuscular once  insulin lispro (ADMELOG) corrective regimen sliding scale   SubCutaneous at bedtime  lacosamide IVPB 100 milliGRAM(s) IV Intermittent every 12 hours  melatonin 3 milliGRAM(s) Oral at bedtime PRN  metoclopramide Injectable 5 milliGRAM(s) IV Push three times a day  midodrine 10 milliGRAM(s) Oral every 8 hours  multivitamin 1 Tablet(s) Oral daily  norepinephrine Infusion 0.05 MICROgram(s)/kG/Min IV Continuous <Continuous>  pantoprazole  Injectable 40 milliGRAM(s) IV Push two times a day  polyethylene glycol 3350 17 Gram(s) Oral two times a day  senna 2 Tablet(s) Oral at bedtime  silver sulfADIAZINE 1% Cream 1 Application(s) Topical two times a day  tamsulosin 0.4 milliGRAM(s) Oral at bedtime  valproate sodium  IVPB 750 milliGRAM(s) IV Intermittent every 8 hours      ANTIBIOTICS:

## 2023-08-04 NOTE — PROGRESS NOTE ADULT - ATTENDING COMMENTS
Patient seen and examined and agree with above except as noted.  Patients history, notes, labs, imaging, vitals and meds reviewed personally.    Plan as above
Patient seen and examined and agree with above except as noted.  Patients history, notes, labs, imaging, vitals and meds reviewed personally.  Jerking on left side slightly better and prior to stimulating him not seen.  After stimulation slight myoclonic jerks are seen however are significantly low amplitude then prior exams    Plan as above
Duplicate note for today however most recent update above
Patient seen and examined and agree with above except as noted.  Patients history, notes, labs, imaging, vitals and meds reviewed personally.  Clinically unchanged.  VEEG at bedside shows similar pleds and when patient stimulated he begins jerking the left side  Pitting edema throughout including 2+ in RUE  not following commands and no response to noxious stimuli    Plan as above

## 2023-08-04 NOTE — PROGRESS NOTE ADULT - TIME BILLING
Patient seen at bedside time spent evaluating and treating the patient's acute illness and medical conditions as well as time spent reviewing labs, radiology and discussing the case on multidisciplinary rounds.
time spent on review of labs, imaging studies, old records, obtaining history, personally examining patient, entering orders for medications/tests/etc, discussions with other health care providers, documentation in electronic health records, independent interpretation of labs, imaging/procedure results and care coordination.
time spent on review of labs, imaging studies, old records, obtaining history, personally examining patient, entering orders for medications/tests/etc, discussions with other health care providers, documentation in electronic health records, independent interpretation of labs, imaging/procedure results and care coordination.
time spent on review of labs, imaging studies, old records, obtaining history, personally examining patient, counselling and communicating with patient wife, entering orders for medications/tests/etc, discussions with other health care providers, documentation in electronic health records, independent interpretation of labs, imaging/procedure results and care coordination. And [facility]

## 2023-08-04 NOTE — EEG REPORT - NS EEG TEXT BOX
Epilepsy Attending Note:     ALICIA BARBOUR    63y Male  MRN [id number]    Vital Signs Last 24 Hrs  T(C): 36.7 (04 Aug 2023 04:00), Max: 36.7 (04 Aug 2023 04:00)  T(F): 98.1 (04 Aug 2023 04:00), Max: 98.1 (04 Aug 2023 04:00)  HR: 85 (04 Aug 2023 08:04) (84 - 88)  BP: 92/53 (04 Aug 2023 07:32) (81/49 - 104/53)  BP(mean): --  RR: 16 (04 Aug 2023 04:00) (16 - 20)  SpO2: 97% (04 Aug 2023 08:04) (97% - 100%)    Parameters below as of 03 Aug 2023 20:00  Patient On (Oxygen Delivery Method): mask, Venturi                              9.0    13.65 )-----------( 191      ( 04 Aug 2023 06:20 )             28.8       08-04    138  |  102  |  47<H>  ----------------------------<  83  4.5   |  23  |  2.3<H>    Ca    8.8      04 Aug 2023 06:20  Phos  2.2     08-04  Mg     2.4     08-04    TPro  5.3<L>  /  Alb  2.1<L>  /  TBili  0.3  /  DBili  x   /  AST  20  /  ALT  6   /  AlkPhos  131<H>  08-04      MEDICATIONS  (STANDING):  aMIOdarone    Tablet 200 milliGRAM(s) Oral daily  ascorbic acid 500 milliGRAM(s) Oral daily  atorvastatin 80 milliGRAM(s) Oral at bedtime  chlorhexidine 0.12% Liquid 15 milliLiter(s) Oral Mucosa every 12 hours  chlorhexidine 2% Cloths 1 Application(s) Topical <User Schedule>  clonazePAM  Tablet 3 milliGRAM(s) Oral three times a day  collagenase Ointment 1 Application(s) Topical once  collagenase Ointment 1 Application(s) Topical two times a day  Dakins Solution - 1/2 Strength 1 Application(s) Topical every 12 hours  dextrose 5%. 1000 milliLiter(s) (100 mL/Hr) IV Continuous <Continuous>  dextrose 5%. 1000 milliLiter(s) (50 mL/Hr) IV Continuous <Continuous>  dextrose 50% Injectable 25 Gram(s) IV Push once  dextrose 50% Injectable 12.5 Gram(s) IV Push once  dextrose 50% Injectable 25 Gram(s) IV Push once  epoetin guanakito-epbx (RETACRIT) Injectable 42706 Unit(s) SubCutaneous every 7 days  ergocalciferol Drops 1000 Unit(s) Oral daily  folic acid 1 milliGRAM(s) Oral daily  glucagon  Injectable 1 milliGRAM(s) IV Push once  glucagon  Injectable 1 milliGRAM(s) IntraMuscular once  insulin lispro (ADMELOG) corrective regimen sliding scale   SubCutaneous at bedtime  lacosamide IVPB 100 milliGRAM(s) IV Intermittent every 12 hours  metoclopramide Injectable 5 milliGRAM(s) IV Push three times a day  midodrine 10 milliGRAM(s) Oral every 8 hours  multivitamin 1 Tablet(s) Oral daily  norepinephrine Infusion 0.05 MICROgram(s)/kG/Min (8.78 mL/Hr) IV Continuous <Continuous>  pantoprazole  Injectable 40 milliGRAM(s) IV Push two times a day  polyethylene glycol 3350 17 Gram(s) Oral two times a day  senna 2 Tablet(s) Oral at bedtime  silver sulfADIAZINE 1% Cream 1 Application(s) Topical two times a day  tamsulosin 0.4 milliGRAM(s) Oral at bedtime  valproate sodium  IVPB 750 milliGRAM(s) IV Intermittent every 8 hours    MEDICATIONS  (PRN):  acetaminophen     Tablet .. 650 milliGRAM(s) Oral every 6 hours PRN Temp greater or equal to 38C (100.4F), Mild Pain (1 - 3)  aluminum hydroxide/magnesium hydroxide/simethicone Suspension 30 milliLiter(s) Oral every 4 hours PRN Dyspepsia  dextrose Oral Gel 15 Gram(s) Oral once PRN Blood Glucose LESS THAN 70 milliGRAM(s)/deciliter  melatonin 3 milliGRAM(s) Oral at bedtime PRN Insomnia      Valproic Acid Level, Serum: 40.0 ug/mL [50.0 - 100.0] (08-02-23 @ 16:56)  Valproic Acid Level, Serum: 35.0 ug/mL [50.0 - 100.0] (08-02-23 @ 06:43)        VEEG in the last 24 hours:    Background-------continues, asymmetrical with higher amplitude from the right side . The BG is relatively showing better reactivity and PDR reaching frequencies in the range of 5-6 hz    Focal and generalized slowing----1- moderate generalized slowing. 2- moderate to severe right hemispheric FC>FT slowing.  3-independent left anterior quadrants slowing    Interictal activity-----------  frequent right hemispheric sharp waves  with lower level of periodicity     Events----- a rapid response called today am. The event was not associated with abnormal EEG pattern    Seizures-------None    Impression: abnormal as above    Plan - as/neurology

## 2023-08-04 NOTE — PROGRESS NOTE ADULT - ASSESSMENT
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  was found also to be hypoglycemic with blood sugar of 10 and had two episodes of tonic clonic seizures in the ED.   Patient non-verbal at baseline .    #Hypotensive  - pt was hypotensive this AM, rapid called  - given LR bolus and levo  - initially good response, again hypotensive   - blood work appears hemoconcentrated, after bolus, IVC full  - team had fu conversations with pt's wife re prognosis and regaining neurologic function --> plan for CMO this PM ~6pm when wife is here with palliative     #Hypoglycemia possibly due to sulfonyl urea med  #GTC seizure x2.  #status epilepticus in the setting of hypoglycemia  #ESRD on glimiperide  - Blood sugar in ed: 10  - s/P multiple iv dextrose solutions   - Patient s/p Versed 4mg x1, Keppra 1gram IV x1, Ativan 4mg IV x2, with Propofol gtt initiated.  - s/p valproic acid bolus and valproic acid standing, clonazepam increased to 3 TID  - fu free valproic acid level  - neurosurgery ostomized shunt settings - no further neurosurgical intervention per discussions with wife  CTH:  - Stable suboccipital craniectomy and cerebellar encephalomalacia with   small stable overlying pseudomeningocele compared to the prior CT head   from 7/20/2023.  -Stable moderate dilatation of the fourth ventricle and cerebral aqueduct.   Slight interval enlargement of the supratentorial ventricles could   reflect reexpansion versus mild hydrocephalus. Unchanged positioning of   the right parietal approach ventriculostomy catheter.  -Unchanged right cerebral convexity subdural collection with stable to   decreased minimal leftward midline shift.  - video EEG: Focal and generalized slowing, Interictal activity, runs of bilateral FP ( higher amplitude from the right) rhythmic activity that appears not be physiological . ( ? of artifact). It appears as questonable low amplitude spike and aftergoing slow with modified morphology( ? possiblity of change due the the prior procedure)  - consider Ativan challenge as per NICU   - now on D10, propofol 50 and levophed. if no seizure dc propofol  - Hold ASA as per Neuro  - In the setting of ESRD, D/C'ed Keppra -> s/p VPA load 40mg/kg, continue 500mg q12hrs  - A1c 5.7, lipids unremarkable  - TSH 5.37, Ft4, pro-insulin and Serum Cortisol  - BCx NGTD, procalcitonin 0.47, CRP 47    #ESRD on Dialysis through Tesio cath   # Acute Renal Failure, started on HD 6/1, as per Renal team rec.   - HD today  - daily BMP monitoring, daily weight, daily strict I/O chart  - s/p permanent HD cath. placement on 7/7/23  - fu nephro notes : Biweekly schedule . no acute indications to start CVVHD today.   - Vitamin D and PTH WNL  - On ritacrit and venofer   - consider increasing midodrine before HD  - dc doxazosin -> flomax    #tube feeds regurgitating through nose - resolved  - switched to bolus feeds  - no desats  - added prokinetic (reglan)   - increased bowel regimen     # anemia of chronic disease - no gross bleeding events  - hb 7.2  - s/p 1 pack RBC  - transfuse prn for goal > 7  - On ritacrit and venofer   - Hold ASA as per Neuro    # Hx ICH   # s/p  shunt  - Bedbound from NH. chronic non-verbal , quadriplegia - continue daily care.   - c/w keppra 500mg BID for seizure prophylaxis  - fu neurosurgery   - Hold ASA as per Neuro    # chronic respiratory failure, vent dependant via trach  -  trach care,  Vent management per Pulm. team     # Chronic dysphagia  - sp peg, peg care     # Afib w/ RVR  -now rate controlled     #Acute Decompensated CHF  -continued on Amiodarone tx, rate controlled     # multiple sacral wounds and buttock  - further wound care as per Wound care specialist report, frequent body position change  - wound care per burn team recs    # h/o DM 2  - continue bsfs monitoring with insulin sliding scale co.    DVT prophylaxis: scd  Activity:  Diet: DASH  Dispo:  Code: DNR. Fu palliative conversation

## 2023-08-04 NOTE — PROGRESS NOTE ADULT - PROBLEM SELECTOR PLAN 5
-DNR only  -ongoing medical management  -per primary team discussion with family, possible plan for palliative removal of vent and CMO after family visits today  -will give recommendations for symptom management as below if family does decided on comfort measures only
-DNR only  -primary to discuss prognosis with family further  -ongoing medical management  -GOC as appropriate.

## 2023-08-04 NOTE — CONSULT NOTE ADULT - CONSULT REASON
GOC
eval VPS
sacral wound
enteral nutrition support
Seizures
Difficult teixeira
ESRD on HD
seizures ?status epilepticus
sepsis

## 2023-08-04 NOTE — PROGRESS NOTE ADULT - SUBJECTIVE AND OBJECTIVE BOX
Dr Dial reviewed HCT - stable no further Neurosurgical intervention needed at this time reconsult PRN

## 2023-08-04 NOTE — RAPID RESPONSE TEAM SUMMARY - NSSITUATIONBACKGROUNDRRT_GEN_ALL_CORE
Rapid was called for hypotension.  Rapid was called for hypotension. One IV wasn't functioning. BP was 81/49, then 60's/40's on repeat, MAP 40's. One IV wasn't functioning. Rapid was called for hypotension.

## 2023-08-04 NOTE — CONSULT NOTE ADULT - ASSESSMENT
63-year-old male with a past medical history of hyperlipidemia, atrial fibrillation, diabetes, hypertension, large posterior fossa IPH w/ IVH/SAH/hydrocephalus, s/p suboccipital craniectomy for PICA aneurysm resection and  shunt in 2021, CAD s/p stents on ASA, Recurrent C diff, ESRD on Dialysis through Tesio cath and is trach/PEG who was brought in from nursing home initially for anemia was found also to be hypoglycemic at 10 and had 2 episodes of tonic clonic seizures in the ED.    Blood work was done at nursing home and found out that hemoglobin was 6.4. Patient was recently started on Glimeperide at the NH.  No sign recent fevers, bleeding per PEG, thickening of the sputum or hematochezia.    In the ED the patient was hemodynamically unstable and in status epilepticus.  Versed, Ativan, keppra and propofol were given in the ED and patient was started on Valproic acid as per NeuroCrit team  MAP less than 65 patient was started on Levophed after central line insertion  Labs were significant for mild leukocytosis, severe hypoglycemia and Hemoglobin 7,2    Patient admitted for status epilepticus secondary to hypoglycemia and possible sepsis.    IMPRESSION/RECOMMENDATIONS

## 2023-08-04 NOTE — PROGRESS NOTE ADULT - PROBLEM SELECTOR PLAN 1
vEEG showed interictal activity. S/p adjustment of shunt by neurosurgery on 7/31  -continue vEEG  -f/u neurology  -increased clonazepam dose per neurology  -continue vimpat, depakote  -per primary team discussion with family, possible plan for palliative removal of vent and CMO after family visits today
vEEG showed interictal activity. S/p adjustment of shunt by neurosurgery on 7/31  -continue vEEG  -f/u neurology  -increased clonazepam dose per neurology  -continue vimpat, depakote  -MRI with CSF flow when medically able  -GOC as appropriate

## 2023-08-04 NOTE — PROGRESS NOTE ADULT - PROVIDER SPECIALTY LIST ADULT
Critical Care
Hospitalist
Internal Medicine
MICU
Neurology
Pulmonology
Critical Care
Critical Care
NSICU
NSICU
Nephrology
Neurology
Pulmonology
Pulmonology
Critical Care
Hospitalist
Hospitalist
Internal Medicine
MICU
MICU
Nephrology
Neurology
Nutrition Support
Internal Medicine
Internal Medicine
MICU
Neurology
Neurosurgery
Nutrition Support
Pulmonology
Palliative Care
Palliative Care

## 2023-08-04 NOTE — PROGRESS NOTE ADULT - SUBJECTIVE AND OBJECTIVE BOX
Nephrology progress note    THIS IS AN INCOMPLETE NOTE . FULL NOTE TO FOLLOW SHORTLY    Patient is seen and examined, events over the last 24 h noted .    Allergies:  penicillin (Other)    Hospital Medications:   MEDICATIONS  (STANDING):  aMIOdarone    Tablet 200 milliGRAM(s) Oral daily  ascorbic acid 500 milliGRAM(s) Oral daily  atorvastatin 80 milliGRAM(s) Oral at bedtime  chlorhexidine 0.12% Liquid 15 milliLiter(s) Oral Mucosa every 12 hours  chlorhexidine 2% Cloths 1 Application(s) Topical <User Schedule>  clonazePAM  Tablet 3 milliGRAM(s) Oral three times a day  collagenase Ointment 1 Application(s) Topical once  collagenase Ointment 1 Application(s) Topical two times a day  Dakins Solution - 1/2 Strength 1 Application(s) Topical every 12 hours  dextrose 5%. 1000 milliLiter(s) (100 mL/Hr) IV Continuous <Continuous>  dextrose 5%. 1000 milliLiter(s) (50 mL/Hr) IV Continuous <Continuous>  dextrose 50% Injectable 25 Gram(s) IV Push once  dextrose 50% Injectable 12.5 Gram(s) IV Push once  dextrose 50% Injectable 25 Gram(s) IV Push once  epoetin guanakito-epbx (RETACRIT) Injectable 59468 Unit(s) SubCutaneous every 7 days  ergocalciferol Drops 1000 Unit(s) Oral daily  folic acid 1 milliGRAM(s) Oral daily  glucagon  Injectable 1 milliGRAM(s) IV Push once  glucagon  Injectable 1 milliGRAM(s) IntraMuscular once  insulin lispro (ADMELOG) corrective regimen sliding scale   SubCutaneous at bedtime  lacosamide IVPB 100 milliGRAM(s) IV Intermittent every 12 hours  metoclopramide Injectable 5 milliGRAM(s) IV Push three times a day  midodrine 10 milliGRAM(s) Oral every 8 hours  multivitamin 1 Tablet(s) Oral daily  norepinephrine Infusion 0.05 MICROgram(s)/kG/Min (8.78 mL/Hr) IV Continuous <Continuous>  pantoprazole  Injectable 40 milliGRAM(s) IV Push two times a day  polyethylene glycol 3350 17 Gram(s) Oral two times a day  senna 2 Tablet(s) Oral at bedtime  silver sulfADIAZINE 1% Cream 1 Application(s) Topical two times a day  tamsulosin 0.4 milliGRAM(s) Oral at bedtime  valproate sodium  IVPB 750 milliGRAM(s) IV Intermittent every 8 hours        VITALS:  T(F): 98.1 (08-04-23 @ 04:00), Max: 98.1 (08-04-23 @ 04:00)  HR: 85 (08-04-23 @ 08:04)  BP: 92/53 (08-04-23 @ 07:32)  RR: 16 (08-04-23 @ 04:00)  SpO2: 97% (08-04-23 @ 08:04)  Wt(kg): --    08-02 @ 07:01  -  08-03 @ 07:00  --------------------------------------------------------  IN: 0 mL / OUT: 400 mL / NET: -400 mL    08-03 @ 07:01  -  08-04 @ 07:00  --------------------------------------------------------  IN: 575 mL / OUT: 1400 mL / NET: -825 mL          PHYSICAL EXAM:  Constitutional: NAD  HEENT: anicteric sclera, oropharynx clear, MMM  Neck: No JVD  Respiratory: CTAB, no wheezes, rales or rhonchi  Cardiovascular: S1, S2, RRR  Gastrointestinal: BS+, soft, NT/ND  Extremities: No cyanosis or clubbing. No peripheral edema  :  No teixeira.   Skin: No rashes    LABS:  08-04    138  |  102  |  47<H>  ----------------------------<  83  4.5   |  23  |  2.3<H>    Ca    8.8      04 Aug 2023 06:20  Phos  2.2     08-04  Mg     2.4     08-04    TPro  5.3<L>  /  Alb  2.1<L>  /  TBili  0.3  /  DBili      /  AST  20  /  ALT  6   /  AlkPhos  131<H>  08-04                          9.0    13.65 )-----------( 191      ( 04 Aug 2023 06:20 )             28.8       Urine Studies:  Urinalysis Basic - ( 04 Aug 2023 06:20 )    Color:  / Appearance:  / SG:  / pH:   Gluc: 83 mg/dL / Ketone:   / Bili:  / Urobili:    Blood:  / Protein:  / Nitrite:    Leuk Esterase:  / RBC:  / WBC    Sq Epi:  / Non Sq Epi:  / Bacteria:           Iron 49, TIBC 138, %sat 36      [07-28-23 @ 05:31]  Ferritin 1199      [07-28-23 @ 05:31]  PTH -- (Ca 8.4)      [08-01-23 @ 04:30]   18  PTH -- (Ca 8.9)      [07-28-23 @ 05:31]   20  Vitamin D (25OH) 42      [08-01-23 @ 04:30]  TSH 5.37      [07-27-23 @ 05:33]  Lipid: chol 72, TG 37, HDL 42, LDL --      [07-27-23 @ 05:33]    HBsAb <3.0      [06-01-23 @ 22:50]  HBsAb Nonreact      [07-14-23 @ 10:53]  HBsAg Nonreact      [07-11-23 @ 16:58]  HBcAb Nonreact      [06-01-23 @ 22:50]  HCV 0.16, Nonreact      [07-11-23 @ 16:58]  HIV Nonreact      [06-08-23 @ 16:00]  HIV Nonreact      [06-08-23 @ 12:20]        RADIOLOGY & ADDITIONAL STUDIES:   Nephrology progress note  Patient is seen and examined, events over the last 24 h noted .  Lying in bed   trached     Allergies:  penicillin (Other)    Hospital Medications:   MEDICATIONS  (STANDING):  aMIOdarone    Tablet 200 milliGRAM(s) Oral daily  ascorbic acid 500 milliGRAM(s) Oral daily  atorvastatin 80 milliGRAM(s) Oral at bedtime  clonazePAM  Tablet 3 milliGRAM(s) Oral three times a day  collagenase Ointment 1 Application(s) Topical once  collagenase Ointment 1 Application(s) Topical two times a day  Dakins Solution - 1/2 Strength 1 Application(s) Topical every 12 hours  epoetin guanakito-epbx (RETACRIT) Injectable 95452 Unit(s) SubCutaneous every 7 days  ergocalciferol Drops 1000 Unit(s) Oral daily  folic acid 1 milliGRAM(s) Oral daily  glucagon  Injectable 1 milliGRAM(s) IV Push once  glucagon  Injectable 1 milliGRAM(s) IntraMuscular once  insulin lispro (ADMELOG) corrective regimen sliding scale   SubCutaneous at bedtime  lacosamide IVPB 100 milliGRAM(s) IV Intermittent every 12 hours  metoclopramide Injectable 5 milliGRAM(s) IV Push three times a day  midodrine 10 milliGRAM(s) Oral every 8 hours  multivitamin 1 Tablet(s) Oral daily  norepinephrine Infusion 0.05 MICROgram(s)/kG/Min (8.78 mL/Hr) IV Continuous <Continuous>  pantoprazole  Injectable 40 milliGRAM(s) IV Push two times a day  polyethylene glycol 3350 17 Gram(s) Oral two times a day  senna 2 Tablet(s) Oral at bedtime  silver sulfADIAZINE 1% Cream 1 Application(s) Topical two times a day  tamsulosin 0.4 milliGRAM(s) Oral at bedtime  valproate sodium  IVPB 750 milliGRAM(s) IV Intermittent every 8 hours        VITALS:  T(F): 98.1 (08-04-23 @ 04:00), Max: 98.1 (08-04-23 @ 04:00)  HR: 85 (08-04-23 @ 08:04)  BP: 92/53 (08-04-23 @ 07:32)  RR: 16 (08-04-23 @ 04:00)  SpO2: 97% (08-04-23 @ 08:04)      08-02 @ 07:01  -  08-03 @ 07:00  --------------------------------------------------------  IN: 0 mL / OUT: 400 mL / NET: -400 mL    08-03 @ 07:01  -  08-04 @ 07:00  --------------------------------------------------------  IN: 575 mL / OUT: 1400 mL / NET: -825 mL          PHYSICAL EXAM:  Constitutional: trached on MV   Respiratory: CTAB,   Cardiovascular: S1, S2, RRR  Gastrointestinal: BS+, soft, NT/ND  Extremities: No cyanosis or clubbing. No peripheral edema  :  No teixeira.   Skin: No rashes    LABS:    08-04    138  |  102  |  47<H>  ----------------------------<  83  4.5   |  23  |  2.3<H>    Ca    8.8      04 Aug 2023 06:20  Phos  2.2     08-04  Mg     2.4     08-04    TPro  5.3<L>  /  Alb  2.1<L>  /  TBili  0.3  /  DBili      /  AST  20  /  ALT  6   /  AlkPhos  131<H>  08-04                          9.0    13.65 )-----------( 191      ( 04 Aug 2023 06:20 )             28.8       Urine Studies:  Urinalysis Basic - ( 04 Aug 2023 06:20 )    Color:  / Appearance:  / SG:  / pH:   Gluc: 83 mg/dL / Ketone:   / Bili:  / Urobili:    Blood:  / Protein:  / Nitrite:    Leuk Esterase:  / RBC:  / WBC    Sq Epi:  / Non Sq Epi:  / Bacteria:           Iron 49, TIBC 138, %sat 36      [07-28-23 @ 05:31]  Ferritin 1199      [07-28-23 @ 05:31]  PTH -- (Ca 8.4)      [08-01-23 @ 04:30]   18  PTH -- (Ca 8.9)      [07-28-23 @ 05:31]   20  Vitamin D (25OH) 42      [08-01-23 @ 04:30]  TSH 5.37      [07-27-23 @ 05:33]  Lipid: chol 72, TG 37, HDL 42, LDL --      [07-27-23 @ 05:33]    HBsAb <3.0      [06-01-23 @ 22:50]  HBsAb Nonreact      [07-14-23 @ 10:53]  HBsAg Nonreact      [07-11-23 @ 16:58]  HBcAb Nonreact      [06-01-23 @ 22:50]  HCV 0.16, Nonreact      [07-11-23 @ 16:58]  HIV Nonreact      [06-08-23 @ 16:00]  HIV Nonreact      [06-08-23 @ 12:20]        RADIOLOGY & ADDITIONAL STUDIES:

## 2023-08-04 NOTE — PROGRESS NOTE ADULT - REASON FOR ADMISSION
Seizures and Anemia
Seizures/ Anemia/ hypoglycemia
Seizures and Anemia

## 2023-08-04 NOTE — CONSULT NOTE ADULT - CONSULT REQUESTED DATE/TIME
31-Jul-2023 13:27
27-Jul-2023 01:18
27-Jul-2023 12:18
29-Jun-2023 13:30
04-Aug-2023 14:04
02-Aug-2023 18:05
27-Jul-2023 05:28
28-Jul-2023 14:29
27-Jul-2023 08:11

## 2023-08-04 NOTE — PROGRESS NOTE ADULT - PROBLEM SELECTOR PLAN 4
s/p trach. Vent dependent  -management of vent per pulm team  -per primary team discussion with family, possible plan for palliative removal of vent and CMO after family visits today
s/p trach. Vent dependent  -management of vent per pulm team

## 2023-08-04 NOTE — CONSULT NOTE ADULT - REASON FOR ADMISSION
Seizures and Anemia

## 2023-08-04 NOTE — PROGRESS NOTE ADULT - SUBJECTIVE AND OBJECTIVE BOX
Over Night Events: events noted, vent dependant, neuro reviewed, sp RRT for hypotension  PHYSICAL EXAM    ICU Vital Signs Last 24 Hrs  T(C): 36.7 (04 Aug 2023 04:00), Max: 36.7 (04 Aug 2023 04:00)  T(F): 98.1 (04 Aug 2023 04:00), Max: 98.1 (04 Aug 2023 04:00)  HR: 85 (04 Aug 2023 08:04) (72 - 88)  BP: 92/53 (04 Aug 2023 07:32) (81/49 - 104/53)  RR: 16 (04 Aug 2023 04:00) (16 - 20)  SpO2: 97% (04 Aug 2023 08:04) (97% - 100%)    O2 Parameters below as of 03 Aug 2023 20:00  Patient On (Oxygen Delivery Method): mask, Venturi            General: ill looking  HEENT: trach  Lungs: dec bs both bases  Cardiovascular: Regular   Abdomen: Soft, Positive BS  sacral ulcer  unresponsive      08-03-23 @ 07:01  -  08-04-23 @ 07:00  --------------------------------------------------------  IN:    Enteral Tube Flush: 100 mL    IV PiggyBack: 100 mL    Peptamen A.F.: 375 mL  Total IN: 575 mL    OUT:    Other (mL): 1000 mL    Rectal Tube (mL): 300 mL    Voided (mL): 100 mL  Total OUT: 1400 mL    Total NET: -825 mL          LABS:                          9.0    13.65 )-----------( 191      ( 04 Aug 2023 06:20 )             28.8                                               08-04    138  |  102  |  47<H>  ----------------------------<  83  4.5   |  23  |  2.3<H>    Ca    8.8      04 Aug 2023 06:20  Phos  2.2     08-04  Mg     2.4     08-04    TPro  5.3<L>  /  Alb  2.1<L>  /  TBili  0.3  /  DBili  x   /  AST  20  /  ALT  6   /  AlkPhos  131<H>  08-04                                             Urinalysis Basic - ( 04 Aug 2023 06:20 )    Color: x / Appearance: x / SG: x / pH: x  Gluc: 83 mg/dL / Ketone: x  / Bili: x / Urobili: x   Blood: x / Protein: x / Nitrite: x   Leuk Esterase: x / RBC: x / WBC x   Sq Epi: x / Non Sq Epi: x / Bacteria: x                                                  LIVER FUNCTIONS - ( 04 Aug 2023 06:20 )  Alb: 2.1 g/dL / Pro: 5.3 g/dL / ALK PHOS: 131 U/L / ALT: 6 U/L / AST: 20 U/L / GGT: x                                                                                               Mode: AC/ CMV (Assist Control/ Continuous Mandatory Ventilation)  RR (machine): 18  TV (machine): 450  FiO2: 40  PEEP: 8  MAP: 14  PIP: 28                                          MEDICATIONS  (STANDING):  aMIOdarone    Tablet 200 milliGRAM(s) Oral daily  ascorbic acid 500 milliGRAM(s) Oral daily  atorvastatin 80 milliGRAM(s) Oral at bedtime  chlorhexidine 0.12% Liquid 15 milliLiter(s) Oral Mucosa every 12 hours  chlorhexidine 2% Cloths 1 Application(s) Topical <User Schedule>  clonazePAM  Tablet 3 milliGRAM(s) Oral three times a day  collagenase Ointment 1 Application(s) Topical once  collagenase Ointment 1 Application(s) Topical two times a day  Dakins Solution - 1/2 Strength 1 Application(s) Topical every 12 hours  dextrose 5%. 1000 milliLiter(s) (100 mL/Hr) IV Continuous <Continuous>  dextrose 5%. 1000 milliLiter(s) (50 mL/Hr) IV Continuous <Continuous>  dextrose 50% Injectable 25 Gram(s) IV Push once  dextrose 50% Injectable 12.5 Gram(s) IV Push once  dextrose 50% Injectable 25 Gram(s) IV Push once  epoetin guanakito-epbx (RETACRIT) Injectable 28373 Unit(s) SubCutaneous every 7 days  ergocalciferol Drops 1000 Unit(s) Oral daily  folic acid 1 milliGRAM(s) Oral daily  glucagon  Injectable 1 milliGRAM(s) IV Push once  glucagon  Injectable 1 milliGRAM(s) IntraMuscular once  insulin lispro (ADMELOG) corrective regimen sliding scale   SubCutaneous at bedtime  lacosamide IVPB 100 milliGRAM(s) IV Intermittent every 12 hours  metoclopramide Injectable 5 milliGRAM(s) IV Push three times a day  midodrine 10 milliGRAM(s) Oral every 8 hours  multivitamin 1 Tablet(s) Oral daily  norepinephrine Infusion 0.05 MICROgram(s)/kG/Min (8.78 mL/Hr) IV Continuous <Continuous>  pantoprazole  Injectable 40 milliGRAM(s) IV Push two times a day  polyethylene glycol 3350 17 Gram(s) Oral two times a day  senna 2 Tablet(s) Oral at bedtime  silver sulfADIAZINE 1% Cream 1 Application(s) Topical two times a day  tamsulosin 0.4 milliGRAM(s) Oral at bedtime  valproate sodium  IVPB 750 milliGRAM(s) IV Intermittent every 8 hours    MEDICATIONS  (PRN):  acetaminophen     Tablet .. 650 milliGRAM(s) Oral every 6 hours PRN Temp greater or equal to 38C (100.4F), Mild Pain (1 - 3)  aluminum hydroxide/magnesium hydroxide/simethicone Suspension 30 milliLiter(s) Oral every 4 hours PRN Dyspepsia  dextrose Oral Gel 15 Gram(s) Oral once PRN Blood Glucose LESS THAN 70 milliGRAM(s)/deciliter  melatonin 3 milliGRAM(s) Oral at bedtime PRN Insomnia

## 2023-08-04 NOTE — PROGRESS NOTE ADULT - SUBJECTIVE AND OBJECTIVE BOX
Neurology Progress Note    Interval History:    Patient was seen and examined, overnight hypotension - rapid response - put on Levophed Less jerky movements on his L arms, VEEG - still periodic sharp patterns on R hemisphere. Palliative on board and family members (wife) is aware about poor prognosis but wants updates from neurology as well.     Medications:  acetaminophen     Tablet .. 650 milliGRAM(s) Oral every 6 hours PRN  aluminum hydroxide/magnesium hydroxide/simethicone Suspension 30 milliLiter(s) Oral every 4 hours PRN  aMIOdarone    Tablet 200 milliGRAM(s) Oral daily  ascorbic acid 500 milliGRAM(s) Oral daily  atorvastatin 80 milliGRAM(s) Oral at bedtime  chlorhexidine 0.12% Liquid 15 milliLiter(s) Oral Mucosa every 12 hours  chlorhexidine 2% Cloths 1 Application(s) Topical <User Schedule>  clonazePAM  Tablet 3 milliGRAM(s) Oral three times a day  collagenase Ointment 1 Application(s) Topical once  collagenase Ointment 1 Application(s) Topical two times a day  Dakins Solution - 1/2 Strength 1 Application(s) Topical every 12 hours  dextrose Oral Gel 15 Gram(s) Oral once PRN  epoetin guanakito-epbx (RETACRIT) Injectable 26355 Unit(s) SubCutaneous every 7 days  ergocalciferol Drops 1000 Unit(s) Oral daily  folic acid 1 milliGRAM(s) Oral daily  glucagon  Injectable 1 milliGRAM(s) IV Push once  glucagon  Injectable 1 milliGRAM(s) IntraMuscular once  insulin lispro (ADMELOG) corrective regimen sliding scale   SubCutaneous at bedtime  lacosamide IVPB 100 milliGRAM(s) IV Intermittent every 12 hours  melatonin 3 milliGRAM(s) Oral at bedtime PRN  metoclopramide Injectable 5 milliGRAM(s) IV Push three times a day  midodrine 10 milliGRAM(s) Oral every 8 hours  multivitamin 1 Tablet(s) Oral daily  norepinephrine Infusion 0.05 MICROgram(s)/kG/Min IV Continuous <Continuous>  pantoprazole  Injectable 40 milliGRAM(s) IV Push two times a day  polyethylene glycol 3350 17 Gram(s) Oral two times a day  senna 2 Tablet(s) Oral at bedtime  silver sulfADIAZINE 1% Cream 1 Application(s) Topical two times a day  tamsulosin 0.4 milliGRAM(s) Oral at bedtime  valproate sodium  IVPB 750 milliGRAM(s) IV Intermittent every 8 hours      Vital Signs Last 24 Hrs  T(C): 36.2 (04 Aug 2023 11:20), Max: 36.7 (04 Aug 2023 04:00)  T(F): 97.1 (04 Aug 2023 11:20), Max: 98.1 (04 Aug 2023 04:00)  HR: 97 (04 Aug 2023 11:20) (84 - 97)  BP: 89/74 (04 Aug 2023 11:20) (64/40 - 104/53)  BP(mean): 64 (04 Aug 2023 11:20) (49 - 66)  RR: 20 (04 Aug 2023 11:20) (16 - 20)  SpO2: 99% (04 Aug 2023 11:20) (97% - 100%)    Parameters below as of 04 Aug 2023 11:20  Patient On (Oxygen Delivery Method): ventilator      NEUROLOGICAL EXAMINATION:  Appears ill, on Mechanical vent (trach to vent), no response to verbal or noxious stimuli, very rare/very subtle jerking of his L arm in dystonic posture.   Pupils: non-reactive on direct light, eyes with subtle horizontal jerking movements, no corneal b/l. No oculocephalic, no gag reflex.  General tone of muscle - with tonic on L side, no movements, no signs of withdrawal. DTR abs throughout.

## 2023-08-04 NOTE — PROGRESS NOTE ADULT - SUBJECTIVE AND OBJECTIVE BOX
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    Blood work was done at nursing home and found out that hemoglobin was 6.4. Patient was recently started on Glimeperide at the NH.  No sign recent fevers, bleeding per PEG, thickening of the sputum or hematochezia.    In the ED the patient was hemodynamically unstable and in status epilepticus.  Versed, Ativan, keppra and propofol were given in the ED and patient was started on Valproic acid as per NeuroCrit team  MAP less than 65 patient was started on Levophed after central line insertion  Labs were significant for mild leukocytosis, severe hypoglycemia and Hemoglobin 7,2    Patient admitted for status epilepticus secondary to hypoglycemia and possible sepsis (27 Jul 2023 01:00)    Interval history  -rapid respons  -Patient seen at bedside  -he was unable to participate in exam     ADVANCE DIRECTIVES:     [ ] Full Code [ x] DNR  MOLST  [ ]  Living Will  [ ]   DECISION MAKER(s):  [ ] Health Care Proxy(s)  [ x] Surrogate(s)  [ ] Guardian           Name(s): Phone Number(s): Wife      BASELINE (I)ADL(s) (prior to admission):    Erwin: [ ]Total  [ ] Moderate [ ]Dependent  Palliative Performance Status Version 2:         %    http://npcrc.org/files/news/palliative_performance_scale_[email or web address].pdf    Allergies    penicillin (Other)    Intolerances    MEDICATIONS  (STANDING):  aMIOdarone    Tablet 200 milliGRAM(s) Oral daily  ascorbic acid 500 milliGRAM(s) Oral daily  atorvastatin 80 milliGRAM(s) Oral at bedtime  chlorhexidine 0.12% Liquid 15 milliLiter(s) Oral Mucosa every 12 hours  chlorhexidine 2% Cloths 1 Application(s) Topical <User Schedule>  clonazePAM  Tablet 3 milliGRAM(s) Oral three times a day  collagenase Ointment 1 Application(s) Topical once  collagenase Ointment 1 Application(s) Topical two times a day  Dakins Solution - 1/2 Strength 1 Application(s) Topical every 12 hours  dextrose 5%. 1000 milliLiter(s) (100 mL/Hr) IV Continuous <Continuous>  dextrose 5%. 1000 milliLiter(s) (50 mL/Hr) IV Continuous <Continuous>  dextrose 50% Injectable 25 Gram(s) IV Push once  dextrose 50% Injectable 12.5 Gram(s) IV Push once  dextrose 50% Injectable 25 Gram(s) IV Push once  epoetin guanakito-epbx (RETACRIT) Injectable 45098 Unit(s) SubCutaneous every 7 days  ergocalciferol Drops 1000 Unit(s) Oral daily  folic acid 1 milliGRAM(s) Oral daily  glucagon  Injectable 1 milliGRAM(s) IV Push once  glucagon  Injectable 1 milliGRAM(s) IntraMuscular once  insulin lispro (ADMELOG) corrective regimen sliding scale   SubCutaneous at bedtime  lacosamide IVPB 100 milliGRAM(s) IV Intermittent every 12 hours  metoclopramide Injectable 5 milliGRAM(s) IV Push three times a day  midodrine 10 milliGRAM(s) Oral every 8 hours  multivitamin 1 Tablet(s) Oral daily  norepinephrine Infusion 0.05 MICROgram(s)/kG/Min (8.78 mL/Hr) IV Continuous <Continuous>  pantoprazole  Injectable 40 milliGRAM(s) IV Push two times a day  polyethylene glycol 3350 17 Gram(s) Oral two times a day  senna 2 Tablet(s) Oral at bedtime  silver sulfADIAZINE 1% Cream 1 Application(s) Topical two times a day  tamsulosin 0.4 milliGRAM(s) Oral at bedtime  valproate sodium  IVPB 750 milliGRAM(s) IV Intermittent every 8 hours    MEDICATIONS  (PRN):  acetaminophen     Tablet .. 650 milliGRAM(s) Oral every 6 hours PRN Temp greater or equal to 38C (100.4F), Mild Pain (1 - 3)  aluminum hydroxide/magnesium hydroxide/simethicone Suspension 30 milliLiter(s) Oral every 4 hours PRN Dyspepsia  dextrose Oral Gel 15 Gram(s) Oral once PRN Blood Glucose LESS THAN 70 milliGRAM(s)/deciliter  melatonin 3 milliGRAM(s) Oral at bedtime PRN Insomnia      PRESENT SYMPTOMS: [x ]Unable to obtain due to poor mentation   Source if other than patient:  [ ]Family   [ ]Team     Pain: [ ]yes [ ]no  QOL impact -   Location -                    Aggravating factors -  Quality -  Radiation -  Timing-  Severity (0-10 scale):  Minimal acceptable level (0-10 scale):     CPOT:  1  https://[email or web address]/Critical-Care-Pain-Observation-Tool-(CPOT)    PAIN AD Score:   [email or web address].[email or web address]/painad.pdf (press ctrl +  left click to view)    Dyspnea:                           [ ]None[ ]Mild [ ]Moderate [ ]Severe     Respiratory Distress Observation Scale (RDOS): 0  A score of 0 to 2 signifies little or no respiratory distress, 3 signifies mild distress, scores 4 to 6 indicate moderate distress, and scores greater than 7 signify severe distress  [email or web address]/[email or web address]/[email or web address]-uneyzsifnnb-pnenrhvk-kcdzcjlpjux-lfezr.pdf    Anxiety:                             [ ]None[ ]Mild [ ]Moderate [ ]Severe   Fatigue:                             [ ]None[ ]Mild [ ]Moderate [ ]Severe   Nausea:                             [ ]None[ ]Mild [ ]Moderate [ ]Severe   Loss of appetite:              [ ]None[ ]Mild [ ]Moderate [ ]Severe   Constipation:                    [ ]None[ ]Mild [ ]Moderate [ ]Severe    Other Symptoms:  [x ]All other review of systems negative     Palliative Performance Status Version 2:         20%    [email or web address]/[email or web address]_performance_scale_[email or web address].pdf    PHYSICAL EXAM:  Vital Signs Last 24 Hrs  T(C): 36.2 (04 Aug 2023 11:20), Max: 36.7 (04 Aug 2023 04:00)  T(F): 97.1 (04 Aug 2023 11:20), Max: 98.1 (04 Aug 2023 04:00)  HR: 99 (04 Aug 2023 14:00) (84 - 99)  BP: 89/74 (04 Aug 2023 11:20) (64/40 - 104/53)  BP(mean): 64 (04 Aug 2023 11:20) (49 - 66)  RR: 20 (04 Aug 2023 11:20) (16 - 20)  SpO2: 100% (04 Aug 2023 14:00) (97% - 100%)    Parameters below as of 04 Aug 2023 11:20  Patient On (Oxygen Delivery Method): ventilator    GENERAL:  [ ] No acute distress [ ]Lethargic  [x]Unarousable  [ ]Verbal  [ ]Non-Verbal [ ]Cachexia    BEHAVIORAL/PSYCH:  [ ]Alert and Oriented x  [ ] Anxiety [ ] Delirium [ ] Agitation [ x] Calm   EYES: [x ] No scleral icterus [ ] Scleral icterus [ ] Closed  ENMT:  [ ]Dry mouth  [x ]No external oral lesions [ ] No external ear or nose lesions  CARDIOVASCULAR:  [ ]Regular [ ]Irregular [ ]Tachy [x ]Not Tachy  [ ]Bridger [ ] Edema [ ] No edema  PULMONARY:  [ ]Tachypnea  [ ]Audible excessive secretions [ x] No labored breathing [ ] labored breathing  GASTROINTESTINAL: [ ]Soft  [ ]Distended  [x ]Not distended [ ]Non tender [ ]Tender  MUSCULOSKELETAL: [ ]No clubbing [ ] clubbing  [x ] No cyanosis [ ] cyanosis  NEUROLOGIC: [ ]No focal deficits  [ ]Follows commands  [ x]Does not follow commands  [ ]Cognitive impairment  [ ]Dysphagia  [ ]Dysarthria  [ ]Paresis   SKIN: [ ] Jaundiced [x ] Non-jaundiced [ ]Rash [ ]No Rash [ ] Warm [ ] Dry  MISC/LINES: [ ] ET tube [ ] Trach [ ]NGT/OGT [ ]PEG [ ]Barney    LABS: reviewed by me                                   9.0    13.65 )-----------( 191      ( 04 Aug 2023 06:20 )             28.8       08-04    138  |  102  |  47<H>  ----------------------------<  83  4.5   |  23  |  2.3<H>    Ca    8.8      04 Aug 2023 06:20  Phos  2.2     08-04  Mg     2.4     08-04    TPro  5.3<L>  /  Alb  2.1<L>  /  TBili  0.3  /  DBili  x   /  AST  20  /  ALT  6   /  AlkPhos  131<H>  08-04              Urinalysis Basic - ( 04 Aug 2023 06:20 )    Color: x / Appearance: x / SG: x / pH: x  Gluc: 83 mg/dL / Ketone: x  / Bili: x / Urobili: x   Blood: x / Protein: x / Nitrite: x   Leuk Esterase: x / RBC: x / WBC x   Sq Epi: x / Non Sq Epi: x / Bacteria: x                  CAPILLARY BLOOD GLUCOSE      POCT Blood Glucose.: 106 mg/dL (04 Aug 2023 11:39)                RADIOLOGY & ADDITIONAL STUDIES: reviewed by me    < from: Xray Chest 1 View- PORTABLE-Routine (Xray Chest 1 View- PORTABLE-Routine in AM.) (08.04.23 @ 06:59) >  Impression:    Bilateral effusions. Support devices as described. Grossly unchanged.      < end of copied text >      EKG: reviewed by me    < from: 12 Lead ECG (07.12.23 @ 02:18) >  Ventricular Rate 84 BPM    Atrial Rate 84 BPM    P-R Interval 108 ms    QRS Duration 90 ms    Q-T Interval 356 ms    QTC Calculation(Bazett) 420 ms    R Axis 22 degrees    T Axis 16 degrees    Diagnosis Line Sinus rhythm with short DC  Otherwise normal ECG    < end of copied text >          PROTEIN CALORIE MALNUTRITION PRESENT: [ ]mild [ ]moderate [ ]severe [ ]underweight [ ]morbid obesity  https://www.andeal.org/vault/2440/web/files/ONC/Table_Clinical%20Characteristics%20to%20Document%20Malnutrition-White%20JV%20et%20al%719305.pdf    Height (cm): 172.7 (07-27-23 @ 01:16), 170.2 (06-01-23 @ 09:07), 180.3 (10-04-22 @ 22:39)  Weight (kg): 93.7 (07-27-23 @ 01:16), 89.4 (07-17-23 @ 06:20), 66.4 (10-04-22 @ 22:39)  BMI (kg/m2): 31.4 (07-27-23 @ 01:16), 30.9 (07-17-23 @ 06:20), 22.9 (06-01-23 @ 09:07)  [ ]PPSV2 < or = to 30% [ ]significant weight loss  [ ]poor nutritional intake  [ ]anasarca      [ ]Artificial Nutrition      Palliative Care Spiritual/Emotional Screening Tool Question  Severity (0-4):                    OR                    [ x] Unable to determine/NA  Score of 2 or greater indicates recommendation of Chaplaincy referral  Chaplaincy Referral: [ ] Yes [ ] Refused [ ] Following     Caregiver [place]:  [ ] Yes [ ] No [ x] Deferred  Social Work Referral [ ]  Patient and Family Centered Care Referral [ ]    Anticipatory Grief Present: [ ] Yes [ ] No [ x] Deferred  Social Work Referral [ ]  Patient and Family Centered Care Referral [ ]    Patient discussed with primary medical team MD  Palliative care education provided to patient and/or family

## 2023-08-04 NOTE — PROGRESS NOTE ADULT - ASSESSMENT
63 year old man with history of IPH s/p suboccipital craniectomy and  shunt in 2021, ESRD on dialysis, CAD, recurrent Cdiff presented with anemia.  Hospital course complicated by seizures, hypoglycemia. Palliative care consulted for GOC.    Patient with rapid response and clinical decline overnight. ICU fellow spoke with patient's wife this afternoon over the phone and discussed GOC.   Per his discussion with the patient's wife, likely plan for comfort measures only and ventilator discontinuation after family visits later today.     MEDD (morphine equivalent daily dose):    Education about palliative care provided to patient/family.  See Recs below.    Please call [phone] with questions or concerns 24/7.   We will continue to follow.

## 2023-08-04 NOTE — PROGRESS NOTE ADULT - ASSESSMENT
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      ESRD on HD   Biweekly schedule on Monday/ Thursday sp HD yesterday    BP on the low side continue midodrine   h/h noted on retacrit weekly,  ferritin elevated   ph at goal / no binders   Neurology f/up appreciated   followed by palliative care / waiting for family meeting   overall prognosis poor   will follow

## 2023-08-04 NOTE — PROGRESS NOTE ADULT - ASSESSMENT
IMPRESSION:    Seizure / hypoglycemia on  VEEG  Chronic hypoxemic resp failure/ trach  hypotension  pul edema  HO ESRD on HD  NSTEMI likely type 2  Paroxysmal Afib  H/o ICH s/p trach and PEG  H/O CAD    PLAN:    CNS:  FU VEEG.  Continue AED per neuro.  Neuro sx evaluation appreciated.  fup AED level    HEENT: Oral and trach care    PULMONARY: HOB 45. Aspiration. Goal saturation 92-96%. Vent dependent.  Not weanable.  Pulmonary toilet     CARDIOVASCULAR: Avoid overload. Midodrine to 10 mg Q8 , levophed    GI:  Feeding  Adjust Bowel regimen.      RENAL: trend CMP.  Correct as needed.  Nephrology following. HD per renal.     INFECTIOUS DISEASE: repeat CX, if fever, startt abx    HEMATOLOGICAL: DVT prophylaxis.    ENDOCRINE: Follow up FS. Insulin protocol if needed.    DNR    Off loading.  Skin care per burn / wound nurse    Poor prognosis    SDU

## 2023-08-04 NOTE — PROGRESS NOTE ADULT - PROBLEM SELECTOR PLAN 2
In setting of sulfonylurea   -ISS   -monitor blood glucose  -per primary team discussion with family, possible plan for palliative removal of vent and CMO after family visits today
In setting of sulfonylurea   -ISS   -monitor blood glucose.

## 2023-08-04 NOTE — PROGRESS NOTE ADULT - CONVERSATION DETAILS
Called and spoke with Ms Curry over the phone. I explained to her the current medical condition including the neurology, NSG, nephrology and pulm cc recommendation  and she stated that she already spoke with the team earlier ( pulm cc fellow ) and she stated that she want the patient CMO only but she wants to come her today with her daughter at about 5-6 PM first to see him and to be her to start the CMO process Called and spoke with Ms Curry over the phone. I explained to her the current medical condition including the neurology, NSG, nephrology and pulm cc recommendation  and she stated that she already spoke with the team earlier ( pulm cc fellow ) and she stated that she already told him that she want the patient CMO only but she wants to come her today with her daughter at about 5-6 PM first to see him and to be her to start the CMO process

## 2023-08-04 NOTE — PROGRESS NOTE ADULT - NSPROGADDITIONALINFOA_GEN_ALL_CORE
If patient decides to pursue comfort measures only and palliative vent removal after visiting this evening, recommendations as follows  -Primary team should fill out MOLST that reflects DNR/DNI and comfort measures only  -recommend dilaudid 0.5mg IV once and ativan 0.5mg IV once 5-10 minutes prior to vent removal  -recommend dilaudid 0.5mg IV q15min PRN for pain/dyspnea following vent removal  -recommend ativan 0.5mg IV q1h PRN for anxiety/agitation  -recommend glycopyrrolate 0.2mg IV q6h PRN for secretions    high risk patient
high risk patient with concern for ongoing seizures requiring intensive monitoring in SDU

## 2023-08-04 NOTE — PROGRESS NOTE ADULT - ASSESSMENT
63-year-old male with multiple comorbidities, bedridden at baseline, trach/PEG, AFib, CKD on HD, Hx of IPH w/ IVH/SAH/hydrocephalus, s/p  shunt in 2021, CAD  admitted w AMS, possible SE and VEEG showing R sided PLEDs on VPA 2250/day and /day, clinically - critically ill, on MT and with periodic L sided jerking movements with some visible improvement after CLZ introduction. R-sided PLEDs on VEEG indicates severe focal cerebral damage and prognosis guarded. Palliative team aboard, family members being updated about GOC. From neurology standpoint no further intervention beside c/w ASM meds including Clonazepam 3 mg tid.       Recommendations:     - d/c VEEG today  - c/w Clonazepam to 3 mg tid  - c/w Vimpat 100 mg bid  - c/w Depakote 750 mg tid  - rest of tx per primary team, palliative consult, GOC    - call neurology PRN            The case was discussed w Dr. Santos

## 2023-08-05 NOTE — DISCHARGE NOTE FOR THE EXPIRED PATIENT - HOSPITAL COURSE
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 Blood work was done at nursing home and found out that hemoglobin was 6.4. Patient was recently started on Glimeperide at the NH. No sign recent fevers, bleeding per PEG, thickening of the sputum or hematochezia.    In the ED the patient was hemodynamically unstable and in status epilepticus. Treated with Ativan, keppra and propofol and patient was started on Valproic acid as per NeuroCrit teamMAP less than 65 patient was started on Levophed after central line insertion. Labs were significant for mild leukocytosis, severe hypoglycemia and Hemoglobin 7,2  Patient was eventually admitted on 7/26/23 for status epilepticus secondary to hypoglycemia and possible sepsis      Patient was s/P multiple iv dextrose solutions for hypoglycemia and Patient s/p Versed 4mg x1, Keppra 1gram IV x1, Ativan 4mg IV x2, with Propofol gtt initiated.  CT head was done and in comparison to the previous head CT dated 12/12/2021: Stable position of a right transparietal  shunt catheter. New predominantly low-density subdural collection over the right cerebral convexity measuring 7 mm in width, likely related to shunting. New wedge-shaped hypodensity within the left occipital lobe likely reflecting age indeterminate infarct. Redemonstrated suboccipital craniectomy. 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 Progressive bilateral cerebellar encephalomalacia. New distended appearance of the cerebral aqueduct and fourth   ventricle. New bilateral mastoid and middle ear opacification. Video EEG: Focal and generalized slowing, Interictal activity, runs of bilateral FP ( higher amplitude from the right) rhythmic activity that appears not be physiological. It was uncertain as the low amplitude spike and aftergoing slow with modified morphology  - consider Ativan challenge as per NICU   - now on D10, propofol 50 and levophed. if no seizure dc propofol  - Hold ASA as per Neuro  - In the setting of ESRD, D/C Keppra to VPA load 40mg/kg then continue 500mg q12hrs  - Obtain serum AED levels   - F/U A1c, lipids, TSH, Ft4, pro-insulin and Serum Cortisol  - Follow up blood, urine and sputum cultures, procalcitonin, CRP  - Correct any underlying metabolic, infectious, or electrolyte derangements    For ESRD patient was s/p permanent HD cath. placement on 7/7/23 and started on Biweekly schedule for dialysis. Also treated for anemia of chronic disease however, no gross bleeding events Hgb dropped to 7.2 and received 1 unit of PRBC in addition to ritacrit and venofer       # chronic respiratory failure, vent dependant via trach  -  trach care,  Vent management per Pulm. team     # Chronic dysphagia  - sp peg, peg care     # Afib w/ RVR  -now rate controlled     #Acute Decompensated CHF  -continued on Amiodarone tx, rate controlled       # multiple sacral wounds and buttock   - further wound care as per Wound care specialist report, frequent body position change  - fu burn team recs    # h/o DM 2  - continue bsfs monitoring  with insulin sliding scale co.         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 Blood work was done at nursing home and found out that hemoglobin was 6.4. Patient was recently started on Glimeperide at the NH. No sign recent fevers, bleeding per PEG, thickening of the sputum or hematochezia.    In the ED the patient was hemodynamically unstable and in status epilepticus. Treated with Ativan, keppra and propofol and patient was started on Valproic acid as per NeuroCrit teamMAP less than 65 patient was started on Levophed after central line insertion. Labs were significant for mild leukocytosis, severe hypoglycemia and Hemoglobin 7,2  Patient was eventually admitted on 7/26/23 for status epilepticus secondary to hypoglycemia and possible sepsis      Patient was s/P multiple iv dextrose solutions for hypoglycemia and Patient s/p Versed 4mg x1, Keppra 1gram IV x1, Ativan 4mg IV x2, with Propofol gtt initiated.  CT head was done and in comparison to the previous head CT dated 12/12/2021: Stable position of a right transparietal  shunt catheter. New predominantly low-density subdural collection over the right cerebral convexity measuring 7 mm in width, likely related to shunting. New wedge-shaped hypodensity within the left occipital lobe likely reflecting age indeterminate infarct. Redemonstrated suboccipital craniectomy. 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 Progressive bilateral cerebellar encephalomalacia. New distended appearance of the cerebral aqueduct and fourth   ventricle. New bilateral mastoid and middle ear opacification. Video EEG: Focal and generalized slowing, Interictal activity, runs of bilateral FP ( higher amplitude from the right) rhythmic activity that appears not be physiological. It was uncertain as the low amplitude spike and aftergoing slow with modified morphology.Underlying metabolic, infectious, or electrolyte derangements corrected. Case discusased with family and requested no further neurosurgical interventions but shunt adjustment.Patient was transferred from NICU to SDU.    For ESRD patient was s/p permanent HD cath. placement on 7/7/23 and started on Biweekly schedule for dialysis. - In the setting of ESRD, D/C Keppra to VPA load 40mg/kg then continue 500mg q12hrs. treated for anemia of chronic disease however, no gross bleeding events Hgb dropped to 7.2 and received 1 unit of PRBC in addition to ritacrit and venofer For chronic respiratory failure, vent dependant via trach given trach care and other recs per pulm teamAt 7am on 8/4/23, Patient's vitals 81/49, then 60's/40's on repeat, MAP 40's. One IV wasn't functioning. Rapid was called for hypotension.bolus and started levo. New IV access was obtained. BP improved.    ICU fellow spoke with patient's wife on phone and discussed GOC.   Per his discussion with the patient's wife, plan for comfort measures only and ventilator discontinuation after family visits. On 8/4/23 At9:30pm patient was extubated and cardiac monitors were turned off. At 10:54pm time of death was called.         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 Blood work was done at nursing home and found out that hemoglobin was 6.4. Patient was recently started on Glimeperide at the NH. No sign recent fevers, bleeding per PEG, thickening of the sputum or hematochezia.    In the ED the patient was hemodynamically unstable and in status epilepticus. Treated with Ativan, keppra and propofol and patient was started on Valproic acid as per NeuroCrit teamMAP less than 65 patient was started on Levophed after central line insertion. Labs were significant for mild leukocytosis, severe hypoglycemia and Hemoglobin 7,2  Patient was eventually admitted on 7/26/23 for status epilepticus secondary to hypoglycemia and possible sepsis      Patient was s/P multiple iv dextrose solutions for hypoglycemia and Patient s/p Versed 4mg x1, Keppra 1gram IV x1, Ativan 4mg IV x2, with Propofol gtt initiated.  CT head was done and in comparison to the previous head CT dated 12/12/2021: Stable position of a right transparietal  shunt catheter. New predominantly low-density subdural collection over the right cerebral convexity measuring 7 mm in width, likely related to shunting. New wedge-shaped hypodensity within the left occipital lobe likely reflecting age indeterminate infarct. Redemonstrated suboccipital craniectomy. 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 Progressive bilateral cerebellar encephalomalacia. New distended appearance of the cerebral aqueduct and fourth   ventricle. New bilateral mastoid and middle ear opacification. Video EEG: Focal and generalized slowing, Interictal activity, runs of bilateral FP ( higher amplitude from the right) rhythmic activity that appears not be physiological. It was uncertain as the low amplitude spike and aftergoing slow with modified morphology.Underlying metabolic, infectious, or electrolyte derangements corrected. Case discusased with family and requested no further neurosurgical interventions but shunt adjustment.Patient was transferred from NICU to SDU.    For ESRD patient was s/p permanent HD cath. placement on 7/7/23 and started on Biweekly schedule for dialysis. - In the setting of ESRD, D/C Keppra to VPA load 40mg/kg then continue 500mg q12hrs. treated for anemia of chronic disease however, no gross bleeding events Hgb dropped to 7.2 and received 1 unit of PRBC in addition to ritacrit and venofer For chronic respiratory failure, vent dependant via trach given trach care and other recs per pulm teamAt 7am on 8/4/23, Patient's vitals 81/49, then 60's/40's on repeat, MAP 40's. One IV wasn't functioning. Rapid was called for hypotension.bolus and started levo. New IV access was obtained. BP improved.    ICU fellow spoke with patient's wife on phone and discussed GOC.   Per his discussion with the patient's wife, plan for comfort measures only and ventilator discontinuation after family visits. On 8/4/23 At9:30pm patient was extubated and cardiac monitors were turned off. At 10:54pm time of death was called.      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 Blood work was done at nursing home and found out that hemoglobin was 6.4. Patient was recently started on Glimeperide at the NH. No sign recent fevers, bleeding per PEG, thickening of the sputum or hematochezia.    In the ED the patient was hemodynamically unstable and in status epilepticus. Treated with Ativan, keppra and propofol and patient was started on Valproic acid as per NeuroCrit teamMAP less than 65 patient was started on Levophed after central line insertion. Labs were significant for mild leukocytosis, severe hypoglycemia and Hemoglobin 7,2  Patient was eventually admitted on 7/26/23 for status epilepticus secondary to hypoglycemia and possible sepsis      Patient was s/P multiple iv dextrose solutions for hypoglycemia and Patient s/p Versed 4mg x1, Keppra 1gram IV x1, Ativan 4mg IV x2, with Propofol gtt initiated.  CT head was done and in comparison to the previous head CT dated 12/12/2021: Stable position of a right transparietal  shunt catheter. New predominantly low-density subdural collection over the right cerebral convexity measuring 7 mm in width, likely related to shunting. New wedge-shaped hypodensity within the left occipital lobe likely reflecting age indeterminate infarct. Redemonstrated suboccipital craniectomy. 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 Progressive bilateral cerebellar encephalomalacia. New distended appearance of the cerebral aqueduct and fourth   ventricle. New bilateral mastoid and middle ear opacification. Video EEG: Focal and generalized slowing, Interictal activity, runs of bilateral FP ( higher amplitude from the right) rhythmic activity that appears not be physiological. It was uncertain as the low amplitude spike and aftergoing slow with modified morphology.Underlying metabolic, infectious, or electrolyte derangements corrected. Case discusased with family and requested no further neurosurgical interventions but shunt adjustment.Patient was transferred from NICU to SDU.    For ESRD patient was s/p permanent HD cath. placement on 7/7/23 and started on Biweekly schedule for dialysis. - In the setting of ESRD, D/C Keppra to VPA load 40mg/kg then continue 500mg q12hrs. treated for anemia of chronic disease however, no gross bleeding events Hgb dropped to 7.2 and received 1 unit of PRBC in addition to ritacrit and venofer For chronic respiratory failure, vent dependant via trach given trach care and other recs per pulm teamAt 7am on 8/4/23, Patient's vitals 81/49, then 60's/40's on repeat, MAP 40's. One IV wasn't functioning. Rapid was called for hypotension.bolus and started levo. New IV access was obtained. BP improved.    ICU fellow spoke with patient's wife on phone and discussed GOC.   Per his discussion with the patient's wife, plan for comfort measures only and ventilator discontinuation after family visits. Family including wife and daughter visited the patient later that today and the case and plan was explained to them and they agreed with CMO and discontinuing the ventilator. On 8/4/23 at 9:30pm, while family was at bedside, patient was extubated and cardiac monitors were turned off. At 10:54pm time of death was called. The family members were given time to process the situation and emotional support was provided.      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 Blood work was done at nursing home and found out that hemoglobin was 6.4. Patient was recently started on Glimeperide at the NH. No sign recent fevers, bleeding per PEG, thickening of the sputum or hematochezia.    In the ED the patient was hemodynamically unstable and in status epilepticus. Treated with Ativan, keppra and propofol and patient was started on Valproic acid as per NeuroCrit teamMAP less than 65 patient was started on Levophed after central line insertion. Labs were significant for mild leukocytosis, severe hypoglycemia and Hemoglobin 7,2  Patient was eventually admitted on 7/26/23 for status epilepticus secondary to hypoglycemia and possible sepsis      Patient was s/P multiple iv dextrose solutions for hypoglycemia and Patient s/p Versed 4mg x1, Keppra 1gram IV x1, Ativan 4mg IV x2, with Propofol gtt initiated.  CT head was done and in comparison to the previous head CT dated 12/12/2021: Stable position of a right transparietal  shunt catheter. New predominantly low-density subdural collection over the right cerebral convexity measuring 7 mm in width, likely related to shunting. New wedge-shaped hypodensity within the left occipital lobe likely reflecting age indeterminate infarct. Redemonstrated suboccipital craniectomy. 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 Progressive bilateral cerebellar encephalomalacia. New distended appearance of the cerebral aqueduct and fourth   ventricle. New bilateral mastoid and middle ear opacification. Video EEG: Focal and generalized slowing, Interictal activity, runs of bilateral FP ( higher amplitude from the right) rhythmic activity that appears not be physiological. It was uncertain as the low amplitude spike and aftergoing slow with modified morphology.Underlying metabolic, infectious, or electrolyte derangements corrected. Case discusased with family and requested no further neurosurgical interventions but shunt adjustment.Patient was transferred from NICU to SDU.    For ESRD patient was s/p permanent HD cath. placement on 7/7/23 and started on Biweekly schedule for dialysis. - In the setting of ESRD, D/C Keppra to VPA load 40mg/kg then continue 500mg q12hrs. treated for anemia of chronic disease however, no gross bleeding events Hgb dropped to 7.2 and received 1 unit of PRBC in addition to ritacrit and venofer For chronic respiratory failure, vent dependant via trach given trach care and other recs per pulm teamAt 7am on 8/4/23, Patient's vitals 81/49, then 60's/40's on repeat, MAP 40's. One IV wasn't functioning. Rapid was called for hypotension.bolus and started levo. New IV access was obtained. BP improved.    ICU fellow spoke with patient's wife on phone and discussed GOC.   Per his discussion with the patient's wife, plan for comfort measures only and ventilator discontinuation after family visits. Family including wife and daughter visited the patient later that today and the case and plan was explained to them and they agreed with CMO and discontinuing the ventilator. On 8/4/23 at 9:30pm, while family was at bedside, patient was extubated and cardiac monitors were turned off. At 10:54pm time of death was called. The family members were given time to process the situation and emotional support was provided.      Ref#: [id number]      Attending addendum; the patient pronounced overnight by the night team

## 2023-08-09 NOTE — CDI QUERY NOTE - NSCDIOTHERTXTBX_GEN_ALL_CORE_HH
Based on your professional judgment and the clinical indicators, please clarify if the diagnosis possible sepsis can be further specified as:    - Sepsis was evaluated and ruled out at the time of discharge  - Sepsis was evaluated and could not be excluded at the time of discharge  - Other (please specify):  - Clinically unable to further specify possible sepsis         CLINICAL INDICATORS     ED Provider note: … differential diagnosis associated with patient’s presentation includes: sepsis; hypoglycemia; status epilepticus … Was this patient treated for sepsis? No     H&P Adult: admitted for status epilepticus secondary to hypoglycemia and possible sepsis … Sepsis possibly secondary to pneumonia and/or infected sacral ulcer …      Consult Note Adult-Burn Attending: Stage 4 pressure ulcer to sacrum and left ischium and stage 3 ulcer to right ischium, no sign of infection … Santyl/Moist kerlex packing with ¼ Dakins … Silvadene … no surgical debridement; IV abx as indicated per ID     Progress Note Adult-Critical Care Attending: Infectious disease: monitor VS     Discharge Note for  patient: … eventually admitted on 23 for status epilepticus secondary to hypoglycemia and possible sepsis    Vital Signs:  : (1950) Temp 97.7, HR 89, RR 16; /102; (2140) HR 88  : (0116) Temp 98.1, HR 89; RR 14, BP 99/53    Labs:   : WBC 11.14  : WBC 11.37; procal 0.47; Lactate 0.6   Blood cultures x 2 = no growth    Radiology:   CXR: Increased right effusion and diffuse hazy right lung opacities.   CXR: Bilateral pleural effusion and left lower lobe opacity, unchanged.    CXR: Bilateral pleural effusions and vascular congestion unchanged.     Orders:  () 1L NS IV Bolus over 1 hour x 1 STAT  () Amikacin (ind: Pseudomonas MDR); Vancomycin (ind: sepsis)  (-) Dakins Solution & Silvadine cream (ind: sacral ulcer)      Thank you,  Alison RAI, RN, BSN  [phone]

## 2023-09-06 NOTE — PROGRESS NOTE ADULT - PROBLEM SELECTOR PROBLEM 1
BREAST CANCER SCREENING    Non-compliant report chart audits for BREAST CANCER SCREENING     Outreach to patient in reference to SCHEDULING A MAMMOGRAM EXAM.     WEEKLY BULK ORDER REPORT.  ORDER PLACED   Fever

## 2023-12-04 NOTE — ED ADULT NURSE NOTE - NS ED NOTE ABUSE RESPONSE YN
1344  Pt arrived to PACU, O2 sat 79 and RR 6, Dr. Segal and 2 RNs at bedside to assist with bag mask ventilation. Oral airway exchanged for larger size, suctioning provided with moderate secretions.   1348   Pt respiratory rate increased, simple mask applied, HOB 45 degrees, VSS.   1415  Pt drowsy but arouses to voice. Leg strength 5+, equal and strong. Back pain present but tolerable per pt. Dressing CDI, ice to site.    Unable to assess due to medical condition

## 2024-02-17 NOTE — PROGRESS NOTE ADULT - THIS PATIENT HAS THE FOLLOWING CONDITION(S)/DIAGNOSES ON THIS ADMISSION:
Please take ibuprofen as needed for pain.  Close follow-up with your primary care provider as recommended.  Any worsening symptoms please immediately go to the emergency department   None

## 2024-04-02 NOTE — PATIENT PROFILE ADULT - FUNCTIONAL ASSESSMENT - BASIC MOBILITY 6.
[facility]  Ostomy Progress Note      NAME:  Noe Moctezuma  MEDICAL RECORD NUMBER:  [id number]  AGE: 49 y.o.   GENDER:  male  :  1974  TODAY'S DATE:  2024    Subjective       Chief Complaint   Patient presents with    Other     ostomy         HISTORY of PRESENT ILLNESS     Noe Moctezuma is a 49 y.o. male New patient  who presents today for ostomy/stoma evaluation.     History of Ostomy Context: Patient presents today for ostomy care education and evaluation s/p exploratory lap, takedown and resection of leaking anastomosis with diverting colostomy 3/8/24  per Dr. Dr. Fierro secondary to adenocarcinoma.  Current ostomy care includes Coloplast, 2 piece flat cut to fit pouch with barrier ring and extenders.   He reports wear time of 3 days.  He reports good output from stoma, empties 1-2 times per day.  Has been completing ostomy care independently at home.  He does voice concern for peristomal rash, pruritic in nature.  States that he is current with [facility].  He denies any further needs or concerns.    PAST MEDICAL HISTORY        Diagnosis Date    Allergic rhinitis     spring allergies    Arthritis     hands    Cancer (HCC) 2024    colon cancer    Colitis     Diverticulitis     Hyperlipidemia        PAST SURGICAL HISTORY    Past Surgical History:   Procedure Laterality Date    APPENDECTOMY  2023    COLONOSCOPY      COLONOSCOPY  2020    [id number]    COLONOSCOPY  2024    Dr. Vasquez    CYSTOSCOPY Bilateral 2024    CYSTOSCOPY URETERAL STENT INSERTION, kim placement performed by Jos Ng MD at [facility] OR    HERNIA REPAIR  2007    LAPAROTOMY N/A 3/8/2024    Exploratory Laparotomy, Diverting Colostomy performed by Wale Fierro MD at [facility] OR    SIGMOID COLECTOMY N/A 2024    Robotic Left Sigmoid Colon Resection attempted, converted to open procedure performed by Caroline Covington MD at [facility] OR       FAMILY  [facility]  [address]  [address]   [place], [place] [address]  Telephone: [phone]     FAX [phone]    Home health agencies: [facility] Phone # [phone], Fax # [phone]    Patient Instructions   Visit Discharge/ physician orders:    [facility]: Will need to order new supplies listed below.   - Put sticker over filter when showering. Remove when shower is done.   - Clip hair around stoma when needed. Do not shave with razor.     Supplies   Size   Order #     [place] soft convex flange  (red) Cut to fit 35x38.  05642   [place] drainable pouch (red)   40995   [place] closed pouch (red)   03262   Adapt Skin Barrier Ring or   Brava protective seal  8805  59369    Adapt Barrier Extenders  20370     Adapt Stoma Powder      7906     Change your flange 1-2   times / week and when leaking    Application of two Piece Colostomy/Ileostomy Pouch:  Assemble above supplies in order of application before removing pouch.  If not pre-cut: Cut a hole in the flange to fit the size of your stoma. Remove paper backing.  Remove your worn appliance by gently pulling away from skin and discard.  Wash skin with warm water, rinse and pat dry.  DO NOT USE SOAP!  Inspect your skin for redness or irritation.  If present, apply a small amount of powder to skin around stoma.  Brush off excess powder with a Kleenex. Do not use ointments.  ___ Brava Stoma Powder (97995)         (for wet, weepy skin)    _X__ Desenex Powder to skin around stoma until area clears up.   ____    Antifungal powder (for red,itchy rash)  Mold ring to the inner edge of the flange or around stoma itself.   Center the flange around your stoma.  Smooth the adhesive collar to your abdomen.  Apply your pouch, making sure the end of the pouch is closed.  Apply barrier extenders around outer edge of flange for added security.  Empty when 1/3 full.      Follow up: 4 weeks May 1st at 1:00 pm     [facility]  of instructions/changes:   Family/Caregiver learning/demonstration/return demonstration visit.   Pouching/discharge procedure revised/reviewed with patient/family/caregiver.      Contact with outside resources, i.e., communication with Surgeon/ PCP, home health, ECF.    Contact/referral to ostomy appliance supplier for new or additional products.   Review when to call [facility] or schedule a follow-up visit.   Referral to Emergency Department   Documentation in [facility] completed. [x]   3       Is this the Patient's First Visit with [facility] @ [facility]?  Yes    Is this Patient Established at this [facility] within the last 3 years?   Yes             Clinical Level of Care      Points  0-3  Level 1 []     Points  4-6  Level 2 []     Points  7-8  Level 3 []     Points  9-10  Level 4 [x]     Points  11-12  Level 5 []       Electronically signed by Sonny Vargas RN on 4/2/2024 at 10:49 AM    1 = Total assistance

## 2024-04-26 NOTE — PHYSICAL THERAPY INITIAL EVALUATION ADULT - OCCUPATION
CARDIAC ELECTROPHYSIOLOGY CONSULT NOTE          REQUESTING PROVIDER:  Puneet Davenport MD    PCP: Eduardo Solis MD     CHIEF COMPLAINT:    No chief complaint on file.      Chief Complaint   Afib    HISTORY OF PRESENT ILLNESS   Falguni Hung is a 81 year old female seen today as new patient referred by Dr Limon for PAF.  The patient denies palpitations  There is no recent chest pain  Dyspnea on exertion is stable at 1 block. It does not change when Apple watch says she is in AF  The patient denies lightheadedness or syncope      MEDICATIONS   Inpatient medications:  No current facility-administered medications for this visit.       Outpatient medications:  No outpatient medications have been marked as taking for the 5/7/24 encounter (Appointment) with Puneet Davenport MD.     HISTORIES     ALLERGIES:   Allergen Reactions    Levaquin Other (See Comments)     unknown     Past Medical History:   Diagnosis Date    Diastolic congestive heart failure (CMD)     DM (diabetes mellitus), type 2 (CMD)     Hypertension     Mitral regurgitation     DARLENE (obstructive sleep apnea)     PAF (paroxysmal atrial fibrillation) (CMD)      No past surgical history on file.  No family history on file.  Social History     Tobacco Use    Smoking status: Never    Smokeless tobacco: Never   Vaping Use    Vaping status: never used   Substance Use Topics    Alcohol use: Yes    Drug use: Never       REVIEW OF SYSTEMS   Constitutional: Negative for fatigue, change in activity level or change in weight.   Skin: Negative for edema, pallor, rashes or open wounds.   HEENT: Negative for visual changes, epistaxis or headaches.  Respiratory: LOBO  Cardiovascular: Negative for exertional chest discomfort, chest pressure, palpitations or diaphoresis. Negative for lightheadedness or dizziness. Negative for near syncope or syncope.  Negative for intermittent leg claudication.   Gastrointestinal: Negative for abdominal pain.   Genitourinary: Negative for  hematuria.  Extremities:  Negative for edema, petechiae or clubbing.  Neuro:  Negative for lightheadedness, dizziness or syncope.  Endocrine: Negative for weight loss or weight gain.  Hematological: Negative for bleeding or brusing.  Psych: Negative for change in affect, change in mentation or sleep disturbance.      PHYSICAL EXAM   Vital Signs:    There were no vitals filed for this visit.    General:   Alert, cooperative, conversive in no acute distress.  Skin:  Warm and dry without rash.    Head:  Normocephalic-atraumatic.   Neck:  Trachea is midline. No adenopathy.  Normal thyroid without mass or tenderness.  Eyes:  Normal conjunctiva and sclera.  Cardiovascular: regular rate and rhythm, S1, S2 normal, no murmur, click, rub or gallop  Respiratory: clear to auscultation bilaterally  Gastrointestinal:  Soft and nontender.  Normal bowel sounds.  No hepatomegaly or splenomegaly.   Extremities:  extremities normal, atraumatic, no cyanosis or edema  Neuro:   Orientated x 4.  No focal deficits or lateralizing signs.  Psychiatric:   Cooperative.  Appropriate mood & affect.    LABORATORY DATA     Lab Results   Component Value Date    SODIUM 136 11/02/2022    CHLORIDE 100 11/02/2022    CO2 29 11/02/2022    POTASSIUM 4.5 11/02/2022    BUN 30 (H) 11/02/2022    CREATININE 1.20 11/02/2022    GLUCOSE 111 (H) 11/02/2022     Lab Results   Component Value Date    WBC 8.0 11/02/2022    HCT 43.7 11/02/2022    HGB 13.8 11/02/2022     11/02/2022     No results found for: \"INR\"  No results found for: \"MG\"  No results found for: \"TSH\"    DIAGNOSTIC IMAGING        ASSESSMENT & PLAN   PAF- In sinus. On NOAC. Apparently no symptoms. Relatively modest burden. Will call if she experiences worse sx when in Atrial Fibrillation according to Apple watch or if Atrial Fibrillation lasts a few days.   Mild Mod MR  DHF- f/u Dr Limon   diabetes mellitus     sounds.  No hepatomegaly or splenomegaly.   Extremities:  extremities normal, atraumatic, no cyanosis or edema  Neuro:   Orientated x 4.  No focal deficits or lateralizing signs.  Psychiatric:   Cooperative.  Appropriate mood & affect.    LABORATORY DATA     Lab Results   Component Value Date    SODIUM 136 2022    CHLORIDE 100 2022    CO2 29 2022    POTASSIUM 4.5 2022    BUN 30 (H) 2022    CREATININE 1.20 2022    GLUCOSE 111 (H) 2022     Lab Results   Component Value Date    WBC 8.0 2022    HCT 43.7 2022    HGB 13.8 2022     2022     No results found for: \"INR\"  No results found for: \"MG\"  No results found for: \"TSH\"    DIAGNOSTIC IMAGING      CT CALCIUM SCORE: 2023   1. Coronary Calcium score = 426.  2. This score is in the 70th percentile for age and gender matched  subjects.      Incidental findings:   Mild aortic calcification. Few less than than 4 mm noncalcified nodules in  the right middle lobe of (series 2, 54 and 56) probably represent  noncalcified granulomas. If history of smoking or high-risk of malignancy,  could follow-up with CT chest in 12 months to ensure stability      Stress - Ejection fraction is 71%   The TID is 0.91   Mild inferior wall attenuation due to soft tissue. No other significant perfusion abnormality.       Ecg- NSR  ASSESSMENT & PLAN   PAF- On NOAC. Very symptomatic. Also probably had post conversion pause causing presyncope. As a result I offered her ablation and after hearing risks and benefits she consents.    Mild Mod MR- Stable   DHF- stable    diabetes mellitus  SSS?- Likely presyncope was post conversion pause. Will do ablation and consider ILR if recurs after that.     Procedure Request  Patient Name: Falguni Hung : 1942   Telelphone: [phone]       Location: [facility] [phone]   Procedure: Atrial Fibrillation Ablation   : N/A   Diagnosis:   ED  Diagnosis   1. PAF (paroxysmal atrial fibrillation)  (CMD)  Electrocardiogram 12-Lead            Preferred Date/Time: next PFA     Medications to discontinue: none   Hibiclens: []  YES    [x]  NO   Anesthesia: [x]  YES    []  NO      Mapping: []  RANDY   []  Rhythmia    Ablation:  []  Cryo  []  RF  []  Laser []  PFA     Antibiotic Pouch: []  Tyrx   []  Kangaroo []  N/A    Representative to Call: RANDY Jin bounds - [phone]    Patient instructions:NPO after midnight       rehab technician

## 2024-05-04 NOTE — ED ADULT NURSE REASSESSMENT NOTE - NS ED NURSE REASSESS COMMENT FT1
Amiodarone drip canceled by MD prior to the 18 hour stop time. As per MD Mckinley, oral Amiodarone to be given now. Patent reports living with significant other who helps him with food shopping and cooking.  Endorses consuming 1-2 meals /day with no specific diet followed.  Reports persistent lack of appetite.  He rarely checks his blood sugar at home.  Likely meeting < 75% ENN.

## 2024-05-17 NOTE — ED PROVIDER NOTE - NS ED ATTENDING STATEMENT MOD
Physical Therapy    Visit Type: treatment  SUBJECTIVE  Patient agreed to participate in therapy this date.  \"I am ready to work.\"    Patient participated in all scheduled therapy time this session.       OBJECTIVE          Transfers  Assistive devices: gait belt, 2-wheeled walker  - Sit to stand: modified independent  - Stand to sit: modified independent  No concerns    Ambulation / Gait  - Assistive device: gait belt and 2-wheeled walker  - Distance (feet unless otherwise indicated): 225  - Assist Level: supervision  - Surface: even  Sup for safety with slow ernst, decreased LLE stance time as at baseline but no unsteadiness       Interventions  Therapeutic Exercise  Series of step-ups for LE strength, safety with stairs, and LLE motor activation with use of 2WW: RLE step-ups x5, LLE step-ups x5, LLE single limb step-ups without RLE use and single UE use and retro step-ups. Completed one set with 4\" step, one with 6\" step and one with 8\" step    Sidelying PNF diagonals LLE with light manual resistance full leg and pelvic level  Skilled input: verbal instruction/cues  Verbal Consent: Writer verbally educated and received verbal consent for hand placement, positioning of patient, and techniques to be performed today from patient for clothing adjustments for techniques, hand placement and palpation for techniques and therapist position for techniques as described above and how they are pertinent to the patient's plan of care.         ASSESSMENT   Impairments: balance, abnormal tone, pain, sensation, strength, safety awareness, endurance and coordination  Functional Limitations: all functional mobility    Soto participated well in session emphasizing LLE activation/strength and stair negotiation. He is progressing well and has relatively minimal deficits related to the CABG outside of mild generalized deconditioning. His primary impairments are mostly residual from the CVA last September, but he does have potential to  improve these impairments in order to maximize functional potential.         Discharge Recommendations  PT/OT Mobility Equipment for Discharge: owns 2WW, 4WW, SPC  PT/OT ADL Equipment for Discharge: none  PT Identified Barriers to Discharge: lives alone        Progress: progressing toward goals    Education:   - Present and ready to learn: patient  Education provided during session:  - See body of note.   - Results of above outlined education: Needs reinforcement    Patient at End of Session:   Location: in chair  Safety measures: alarm system in place/re-engaged      Assistant to continue with current plan of care, current goals are appropriate, patient progressing towards set goals      PLAN   Suggestions for next session as indicated:   Treatment plan for next session: 5/18 Transfers and ambulation with 2WW, bed mobility with sternal precautions, LLE strength and coordination, balance with external perturbations (has large energetic dog at home), car transfer to large truck, stairs with home entry setup (2 steps without railing)    Outcome measures: completed 6MWT, 10MWT, 5XSTS 5/16  EKSO interventions: NA - new CABG  Plan considerations:  Family/care partner training details: TBD - has family support for IADLs but not daily supervision  Therapy equipment in patient room:          Interventions: bed mobility, balance, endurance training, functional transfer training, stairs retraining, safety education, strengthening, neuromuscular re-education, patient/family training, gait training, equipment eval/education, energy conservation and compensatory technique education   Frequency: 5-7 days per week   Frequency Comments: 90 min/day at least 5 days/week   Duration: 5/24/2024  Agreement to plan and goals: patient agrees with goals and treatment plan      GOALS  Review date: 5/22/2024  Long Term Goals (LTGs): to be met by discharge from rehab program.   - Patient will complete all bed mobility at [facility] Independent  level   - Patient will perform sit to/from stand at modified Independent level   - Patient will perform stand pivot transfers at modified Independent level with 2 wheeled walker   - Patient will ambulate 150 feet at modified Independent level with 2 wheeled walker   - Patient will negotiate 3 stairs at supervision level with 2 wheeled walker and 0 railings  Documented in the chart in the following areas: Assessment/Plan.      Therapy procedure time and total treatment time can be found documented on the Time Entry flowsheet   I have personally provided the amount of critical care time documented below concurrently with the resident/fellow.  This time excludes time spent on separate procedures and time spent teaching. I have reviewed the resident’s / fellow’s documentation and I agree with the history, exam, and assessment and plan of care.

## 2024-07-09 NOTE — DISCHARGE NOTE PROVIDER - NSRESEARCHGRANT_PROPHYLAXISRECOMFT_GEN_A_CORE
----- Message from Dashawn Starkey MD sent at 7/9/2024  2:04 PM EDT -----  Orthopedic Consult ASAP  ----- Message -----  From: Interface, Radiology Results In  Sent: 7/9/2024   1:08 PM EDT  To: Dashawn Starkey MD  
Patient has ortho appt 7/10/24  
IMPROVE-DD Application Not Available

## 2024-09-03 NOTE — PROGRESS NOTE ADULT - SUBJECTIVE AND OBJECTIVE BOX
NSCU Progress Note    Assessment/Hospital Course:  11/4: Admitted. intubated for GCS/Airway protection. EVD placed in ED POD0 SOC  11/5: s/p diagnostic angio L PICA aneurysm, untreated  11/9: worsening exam, started on HTS; s/p diagnostic angio with L PICA fistua      24 Hour Events/Subjective:  - EVD@10 with 91cc output in 24 hours   - HTS 2%  - plan for SOC for rxn of fistula today      REVIEW OF SYSTEMS:  - unable to obtain given mental status    VITALS:   T(C): 37.3 (11-10-21 @ 07:00), Max: 38.3 (11-10-21 @ 00:00)  T(F): 99.1 (11-10-21 @ 07:00), Max: 100.9 (11-10-21 @ 00:00)  HR: 71 (11-10-21 @ 08:00) (67 - 97)  BP: --  ABP: 106/53 (11-10-21 @ 08:00) (100/51 - 153/68)  ABP(mean): 71 (11-10-21 @ 08:00) (63 - 102)  RR: 20 (11-10-21 @ 08:00) (9 - 23)  SpO2: 100% (11-10-21 @ 08:00) (95% - 100%)                            8.1    15.10 )-----------( 206      ( 09 Nov 2021 16:45 )             26.1     11-09    157<H>  |  119<H>  |  47<H>  ----------------------------<  187<H>  4.1   |  24  |  1.52<H>    Ca    8.7      09 Nov 2021 21:41  Phos  5.2     11-09  Mg     2.2     11-09    TPro  6.1  /  Alb  3.2<L>  /  TBili  0.5  /  DBili  x   /  AST  23  /  ALT  32  /  AlkPhos  92  11-08          PHYSICAL EXAM:    General: intubated  CVS: RRR  Pulm: CTAB  GI: Soft, NTND  Extremities: No LE Edema  Neuro: slight EO to central nox, +cough/gag ,+overbreathes, biting tube, uppers 0/5, BLE spont wiggles toes and briskly withdraws   NSCU Progress Note    Assessment/Hospital Course:  11/4: Admitted. intubated for GCS/Airway protection. EVD placed in ED POD0 SOC  11/5: s/p diagnostic angio L PICA aneurysm, untreated  11/9: worsening exam, started on HTS; s/p diagnostic angio with L PICA fistua      24 Hour Events/Subjective:  - EVD@10 with 91cc output in 24 hours   - febrile, pan cultured overnight  - plan for SOC for rxn of fistula today      REVIEW OF SYSTEMS:  - unable to obtain given mental status    VITALS:   T(C): 37.3 (11-10-21 @ 07:00), Max: 38.3 (11-10-21 @ 00:00)  T(F): 99.1 (11-10-21 @ 07:00), Max: 100.9 (11-10-21 @ 00:00)  HR: 71 (11-10-21 @ 08:00) (67 - 97)  BP: --  ABP: 106/53 (11-10-21 @ 08:00) (100/51 - 153/68)  ABP(mean): 71 (11-10-21 @ 08:00) (63 - 102)  RR: 20 (11-10-21 @ 08:00) (9 - 23)  SpO2: 100% (11-10-21 @ 08:00) (95% - 100%)                            8.1    15.10 )-----------( 206      ( 09 Nov 2021 16:45 )             26.1     11-09    157<H>  |  119<H>  |  47<H>  ----------------------------<  187<H>  4.1   |  24  |  1.52<H>    Ca    8.7      09 Nov 2021 21:41  Phos  5.2     11-09  Mg     2.2     11-09    TPro  6.1  /  Alb  3.2<L>  /  TBili  0.5  /  DBili  x   /  AST  23  /  ALT  32  /  AlkPhos  92  11-08          PHYSICAL EXAM:    Unable to examine in OR   NSCU Progress Note    Assessment/Hospital Course:  11/4: Admitted. intubated for GCS/Airway protection. EVD placed in ED POD0 SOC  11/5: s/p diagnostic angio L PICA aneurysm, untreated  11/9: worsening exam, started on HTS; s/p diagnostic angio with L PICA fistua      24 Hour Events/Subjective:  - EVD@10 with 91cc output in 24 hours   - febrile, pan cultured overnight  - plan for SOC for rxn of fistula today      REVIEW OF SYSTEMS:  - unable to obtain given mental status    VITALS:   T(C): 37.3 (11-10-21 @ 07:00), Max: 38.3 (11-10-21 @ 00:00)  T(F): 99.1 (11-10-21 @ 07:00), Max: 100.9 (11-10-21 @ 00:00)  HR: 71 (11-10-21 @ 08:00) (67 - 97)  BP: --  ABP: 106/53 (11-10-21 @ 08:00) (100/51 - 153/68)  ABP(mean): 71 (11-10-21 @ 08:00) (63 - 102)  RR: 20 (11-10-21 @ 08:00) (9 - 23)  SpO2: 100% (11-10-21 @ 08:00) (95% - 100%)                            8.1    15.10 )-----------( 206      ( 09 Nov 2021 16:45 )             26.1     11-09    157<H>  |  119<H>  |  47<H>  ----------------------------<  187<H>  4.1   |  24  |  1.52<H>    Ca    8.7      09 Nov 2021 21:41  Phos  5.2     11-09  Mg     2.2     11-09    TPro  6.1  /  Alb  3.2<L>  /  TBili  0.5  /  DBili  x   /  AST  23  /  ALT  32  /  AlkPhos  92  11-08          PHYSICAL EXAM:  In NAD  Normal WOB  Comatose, no EO, does not follow commands, reactive pupils, + corneals, + cough, no movement in b/l UE, withdrawal in b/l LEs 18

## 2025-01-11 NOTE — PROGRESS NOTE ADULT - ASSESSMENT
63 year old man with history of ICH s/p trach and PEG in 2021, CAD, recurrent CDIff brought in for elevated BUN/creatinine. Palliative care consulted for GOC.     Patient seen at bedside.  Plan for HD later today.    Education about palliative care provided to patient/family.  See Recs below.    Please call [phone] with questions or concerns 24/7.   We will continue to follow.

## 2025-02-17 NOTE — PROGRESS NOTE ADULT - PROBLEM SELECTOR PROBLEM 4
[facility]   Infusion Clinic Note   Date: 2025   Name: Oxana Rao  : 1952   MRN: [id number]         Reason for Visit: New Patient Visit and OP Infusion (PT HERE FOR FIRST DOSE OF FERAHEME 510MG/NEXT APT: ONE WEEK )         Today: We administered ferumoxytol (Feraheme) 510 mg in sodium chloride 0.9% 117 mL IV.       Ordered By: Lilia Manning A*       For a Diagnosis of: Iron deficiency       At today's visit patient accompanied by: Self      Vitals:   Vitals:    25 0730 25 0750 25 0818 25 0819   BP: 160/82 160/88 (!) 170/98  Comment: nurse notified (!) 168/94  Comment: nurse notified   Pulse: 85 85 69 67   Resp:  16    Temp: 36.6 °C (97.8 °F) 36.4 °C (97.5 °F) 36.3 °C (97.4 °F)    SpO2: 99% 98% 97%              Pre - Treatment Checklist:      - Previous reaction to current treatment: n/a FIRST INFUSION      Assess patient for the concerns below. Document provider notification as appropriate.  - Active or recent infection with/without current antibiotic use: no  - Recent or planned invasive dental work: no  - Recent or planned surgeries: no  - Recently received or plans to receive vaccinations: no  - Has treatment related toxicities: no  - Any chance may be pregnant:  n/a      Pain: 5   - Is the pain different from normal: no   - Is prescribing Doctor aware:  yes      Labs: N/A      Fall Risk Screening: Carroll Fall Risk  History of Falling, Immediate or Within 3 Months: No  Secondary Diagnosis: No  Ambulatory Aid: Walks without aid/bedrest/nurse assist  Intravenous Therapy/Heparin Lock: No  Gait/Transferring: Normal/bedrest/immobile  Mental Status: Oriented to own ability  Carroll Fall Risk Score: 0       Review Of Systems:  Review of Systems   Constitutional:  Negative for appetite change, chills, fatigue, fever and unexpected weight change.   HENT:   Negative for hearing loss, mouth sores, sore throat, tinnitus, trouble swallowing and  voice change.    Eyes:  Negative for eye problems.   Respiratory:  Negative for cough, shortness of breath and wheezing.    Cardiovascular:  Negative for chest pain, leg swelling and palpitations.   Gastrointestinal:  Negative for abdominal pain, blood in stool, constipation, diarrhea, nausea and vomiting.   Genitourinary:  Negative for dysuria, frequency and hematuria.    Musculoskeletal:  Negative for arthralgias and myalgias.   Skin:  Negative for itching, rash and wound.   Neurological:  Positive for headaches. Negative for dizziness, extremity weakness, light-headedness and numbness.   Psychiatric/Behavioral:  Negative for depression. The patient is not nervous/anxious.          Infusion Readiness:  - Assessment Concerns Related to Infusion: No  - Provider notified: n/a      New Patient Education:    NEW PATIENT MEDICATION EDUCATION PT PROVIDED WITH WRITTEN ([facility] PT EDUCATION SHEET) AND VERBAL EDUCATION REGARDING MEDICATION GIVEN. VERIFIED MEDICATION NAME WITH PATIENT AND DISCUSSED REASON FOR USE. BRIEFLY DISCUSSED HOW MEDICATION WORKS AND EDUCATED ON GOAL OF TREATMENT, FREQUENCY OF TREATMENT, ADVERSE RXN'S AND COMMON SIDE EFFECTS TO MONITOR FOR. INSTRUCTED PT TO ASSURE THAT ALL PROVIDERS INCLUDING DENTISTS ARE AWARE OF MEDICATION RECEIVED. DISCUSSED FLOW OF VISIT AND ORIENTED TO INFUSION CENTER. PT VERBALIZES UNDERSTANDING. CALL LIGHT PROVIDED AND PT AWARE TO ALERT STAFF OF ANY CONCERNS DURING TREATMENT.        Treatment Conditions & Drug Specific Questions:    Ferumoxytol  (FERAHEME)    (Unless otherwise specified on patient specific therapy plan):    REMINDERS:  May cause hypotension, patient should be in a reclined or semi-reclined position during infusion.    Ferumoxytol can interfere with MR imaging and Radiology should be notified if a patient has received ferumoxytol within 3 months of the anticipated MR.    Recommended Vitals/Observation:  Vitals: Obtain vital signs before, immediately following  infusion, and 30 minutes after completion of infusion.  Observation: 30 minutes after each infusion. Observation complete.      Lab Results   Component Value Date    IRON 33 (L) 01/15/2025    TIBC 476 (H) 01/15/2025    FERRITIN 20 01/15/2025      Lab Results   Component Value Date    IRONSAT 7 (L) 01/15/2025      Lab Results   Component Value Date    WBC 6.8 01/15/2025    HGB 11.8 (L) 01/15/2025    HCT 38.9 01/15/2025    MCV 91 01/15/2025     01/15/2025            Weight Based Drug Calculations:    WEIGHT BASED DRUGS: NOT APPLICABLE / FLAT DOSE       Post Treatment: Patient tolerated treatment without issue and was discharged in no apparent distress.      Note Authored / Patient Cared for By: Jose Miguel Wilson RN         IVH (intraventricular hemorrhage)

## 2025-02-19 NOTE — ED PROCEDURE NOTE - NS_EDPROVIDERDISPOUSERTYPE_ED_A_ED
He is doing very well has stable imaging and normal screening labs.  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  I discussed with  that I had a very low index of suspicion this was anything to be concerned about.  We could get yearly imaging if it would make him feel better but I do not have a strong feeling that we needed that he agreed and he will call if there are any changes.    
Attending Attestation (For Attendings USE Only)...
Attending Attestation (For Attendings USE Only)...

## 2025-04-15 NOTE — ED ADULT NURSE NOTE - NS ED NURSE RECORD ANOTHER VITAL SIGN
Thank you for choosing Dr. Jackson at [facility]. It's a pleasure to be a part of your healthcare team. Please let us know if you need anything---[phone], press [phone] for the nursing staff. Have a great day!     You may receive a short survey about this visit. We appreciate your feedback.    Your Healthcare Providers,  DANIELLE Davidson and AUDRA Morfin    Yes

## 2025-08-01 NOTE — PATIENT PROFILE ADULT - HISTORY OF COVID-19 VACCINATION
Spoke with daughter who agrees with plan of care. Daughter requests Rx to be sent to [facility] and call placed to [facility] to notify. Daughter states medication time should be 0830 and 2030.     Madyson per Dr. Ohara.   Vaccine status unknown